# Patient Record
Sex: MALE | Race: WHITE | NOT HISPANIC OR LATINO | Employment: OTHER | URBAN - METROPOLITAN AREA
[De-identification: names, ages, dates, MRNs, and addresses within clinical notes are randomized per-mention and may not be internally consistent; named-entity substitution may affect disease eponyms.]

---

## 2017-01-05 ENCOUNTER — TELEPHONE (OUTPATIENT)
Dept: FAMILY MEDICINE | Facility: CLINIC | Age: 72
End: 2017-01-05

## 2017-01-05 DIAGNOSIS — G89.29 CHRONIC LOW BACK PAIN, UNSPECIFIED BACK PAIN LATERALITY, WITH SCIATICA PRESENCE UNSPECIFIED: ICD-10-CM

## 2017-01-05 DIAGNOSIS — F41.9 ANXIETY: ICD-10-CM

## 2017-01-05 DIAGNOSIS — M54.5 CHRONIC LOW BACK PAIN, UNSPECIFIED BACK PAIN LATERALITY, WITH SCIATICA PRESENCE UNSPECIFIED: ICD-10-CM

## 2017-01-05 NOTE — TELEPHONE ENCOUNTER
Last ov with wolf nicole on 11/01/2016.  Last ov with dr valderrama on 08/01/2016.     Xanax last filled 11/29/2016 by dr valderrama.  norco last filled 11/29/2016 by dr valderrama.

## 2017-01-05 NOTE — TELEPHONE ENCOUNTER
----- Message from Sheyla Nuñez sent at 1/5/2017  2:00 PM CST -----  Contact: Citlali  Patient's daughter is requesting refill Rx Xanax and Norco to be sent to     [facility] [id number] - [place] - [address] AT [facility] [address] [place] [address]  Phone: [phone] Fax: [phone]    Out of medication. Please call [phone]. Thanks!

## 2017-01-06 RX ORDER — CALCITRIOL 0.5 UG/1
CAPSULE ORAL
COMMUNITY
Start: 2016-12-21 | End: 2018-03-14

## 2017-01-06 RX ORDER — HYDROCODONE BITARTRATE AND ACETAMINOPHEN 7.5; 325 MG/1; MG/1
1 TABLET ORAL EVERY 6 HOURS PRN
Qty: 90 TABLET | Refills: 0 | Status: SHIPPED | OUTPATIENT
Start: 2017-01-06 | End: 2017-02-10 | Stop reason: SDUPTHER

## 2017-01-06 RX ORDER — ALPRAZOLAM 1 MG/1
1 TABLET ORAL NIGHTLY
Qty: 30 TABLET | Refills: 0 | Status: SHIPPED | OUTPATIENT
Start: 2017-01-06 | End: 2017-02-10 | Stop reason: SDUPTHER

## 2017-01-16 ENCOUNTER — TELEPHONE (OUTPATIENT)
Dept: FAMILY MEDICINE | Facility: CLINIC | Age: 72
End: 2017-01-16

## 2017-01-16 NOTE — TELEPHONE ENCOUNTER
----- Message from Lisha Velasco sent at 1/16/2017  1:20 PM CST -----  Citlali Bustos [phone] ... Has ear ache and sinus infection ...askin to have something called in

## 2017-01-16 NOTE — TELEPHONE ENCOUNTER
Pt daughter states that he has right ear pain and sinus pressure x 2 days.      Please advise if you are sending something in or if you want pt to be seen.    Thanks

## 2017-01-17 NOTE — TELEPHONE ENCOUNTER
Appointment scheduled with patient and daughter for pt to see Dr. Granda tomorrow, 1/18/17 at 3:00 pm.

## 2017-01-18 ENCOUNTER — OFFICE VISIT (OUTPATIENT)
Dept: FAMILY MEDICINE | Facility: CLINIC | Age: 72
End: 2017-01-18
Payer: MEDICARE

## 2017-01-18 ENCOUNTER — HOSPITAL ENCOUNTER (OUTPATIENT)
Dept: RADIOLOGY | Facility: HOSPITAL | Age: 72
Discharge: HOME | End: 2017-01-18
Attending: INTERNAL MEDICINE
Payer: MEDICARE

## 2017-01-18 ENCOUNTER — TELEPHONE (OUTPATIENT)
Dept: UROLOGY | Facility: CLINIC | Age: 72
End: 2017-01-18

## 2017-01-18 ENCOUNTER — HOSPITAL ENCOUNTER (OUTPATIENT)
Dept: RADIOLOGY | Facility: HOSPITAL | Age: 72
Discharge: HOME | End: 2017-01-18
Attending: UROLOGY
Payer: MEDICARE

## 2017-01-18 ENCOUNTER — HOSPITAL ENCOUNTER (OUTPATIENT)
Dept: CARDIOLOGY | Facility: HOSPITAL | Age: 72
Discharge: HOME | End: 2017-01-18
Attending: INTERNAL MEDICINE
Payer: MEDICARE

## 2017-01-18 VITALS
SYSTOLIC BLOOD PRESSURE: 116 MMHG | HEIGHT: 70 IN | RESPIRATION RATE: 20 BRPM | HEART RATE: 99 BPM | DIASTOLIC BLOOD PRESSURE: 69 MMHG | TEMPERATURE: 98 F | WEIGHT: 287.5 LBS | BODY MASS INDEX: 41.16 KG/M2

## 2017-01-18 DIAGNOSIS — N28.89 RENAL MASS: ICD-10-CM

## 2017-01-18 DIAGNOSIS — H66.91 ACUTE OTITIS MEDIA, RIGHT: Primary | ICD-10-CM

## 2017-01-18 DIAGNOSIS — I77.9 CAROTID DISEASE, BILATERAL: Chronic | ICD-10-CM

## 2017-01-18 DIAGNOSIS — I25.10 CORONARY ARTERY DISEASE INVOLVING NATIVE CORONARY ARTERY OF NATIVE HEART WITHOUT ANGINA PECTORIS: Chronic | ICD-10-CM

## 2017-01-18 DIAGNOSIS — J20.9 ACUTE BRONCHITIS, UNSPECIFIED ORGANISM: ICD-10-CM

## 2017-01-18 DIAGNOSIS — R07.2 PRECORDIAL PAIN: ICD-10-CM

## 2017-01-18 PROCEDURE — 96372 THER/PROPH/DIAG INJ SC/IM: CPT | Mod: S$GLB,,, | Performed by: FAMILY MEDICINE

## 2017-01-18 PROCEDURE — 93880 EXTRACRANIAL BILAT STUDY: CPT | Mod: 26,,, | Performed by: INTERNAL MEDICINE

## 2017-01-18 PROCEDURE — 93880 EXTRACRANIAL BILAT STUDY: CPT | Mod: TC

## 2017-01-18 PROCEDURE — 99999 PR PBB SHADOW E&M-EST. PATIENT-LVL III: CPT | Mod: PBBFAC,,, | Performed by: FAMILY MEDICINE

## 2017-01-18 PROCEDURE — 78452 HT MUSCLE IMAGE SPECT MULT: CPT | Mod: TC

## 2017-01-18 PROCEDURE — 93018 CV STRESS TEST I&R ONLY: CPT | Mod: ,,, | Performed by: INTERNAL MEDICINE

## 2017-01-18 PROCEDURE — 1157F ADVNC CARE PLAN IN RCRD: CPT | Mod: S$GLB,,, | Performed by: FAMILY MEDICINE

## 2017-01-18 PROCEDURE — 3078F DIAST BP <80 MM HG: CPT | Mod: S$GLB,,, | Performed by: FAMILY MEDICINE

## 2017-01-18 PROCEDURE — 3074F SYST BP LT 130 MM HG: CPT | Mod: S$GLB,,, | Performed by: FAMILY MEDICINE

## 2017-01-18 PROCEDURE — 76770 US EXAM ABDO BACK WALL COMP: CPT | Mod: 26,,, | Performed by: RADIOLOGY

## 2017-01-18 PROCEDURE — 1125F AMNT PAIN NOTED PAIN PRSNT: CPT | Mod: S$GLB,,, | Performed by: FAMILY MEDICINE

## 2017-01-18 PROCEDURE — 99214 OFFICE O/P EST MOD 30 MIN: CPT | Mod: 25,S$GLB,, | Performed by: FAMILY MEDICINE

## 2017-01-18 PROCEDURE — 76770 US EXAM ABDO BACK WALL COMP: CPT | Mod: TC

## 2017-01-18 PROCEDURE — 1160F RVW MEDS BY RX/DR IN RCRD: CPT | Mod: S$GLB,,, | Performed by: FAMILY MEDICINE

## 2017-01-18 PROCEDURE — 1159F MED LIST DOCD IN RCRD: CPT | Mod: S$GLB,,, | Performed by: FAMILY MEDICINE

## 2017-01-18 PROCEDURE — 63600175 PHARM REV CODE 636 W HCPCS

## 2017-01-18 PROCEDURE — 78452 HT MUSCLE IMAGE SPECT MULT: CPT | Mod: 26,,, | Performed by: INTERNAL MEDICINE

## 2017-01-18 PROCEDURE — 93017 CV STRESS TEST TRACING ONLY: CPT

## 2017-01-18 PROCEDURE — 93016 CV STRESS TEST SUPVJ ONLY: CPT | Mod: ,,, | Performed by: INTERNAL MEDICINE

## 2017-01-18 RX ORDER — AMOXICILLIN AND CLAVULANATE POTASSIUM 875; 125 MG/1; MG/1
1 TABLET, FILM COATED ORAL 2 TIMES DAILY
Qty: 20 TABLET | Refills: 0 | Status: SHIPPED | OUTPATIENT
Start: 2017-01-18 | End: 2017-01-28

## 2017-01-18 RX ORDER — REGADENOSON 0.08 MG/ML
INJECTION, SOLUTION INTRAVENOUS
Status: COMPLETED
Start: 2017-01-18 | End: 2017-01-18

## 2017-01-18 RX ORDER — BETAMETHASONE SODIUM PHOSPHATE AND BETAMETHASONE ACETATE 3; 3 MG/ML; MG/ML
9 INJECTION, SUSPENSION INTRA-ARTICULAR; INTRALESIONAL; INTRAMUSCULAR; SOFT TISSUE
Status: COMPLETED | OUTPATIENT
Start: 2017-01-18 | End: 2017-01-18

## 2017-01-18 RX ADMIN — BETAMETHASONE SODIUM PHOSPHATE AND BETAMETHASONE ACETATE 9 MG: 3; 3 INJECTION, SUSPENSION INTRA-ARTICULAR; INTRALESIONAL; INTRAMUSCULAR; SOFT TISSUE at 02:01

## 2017-01-18 NOTE — TELEPHONE ENCOUNTER
----- Message from Adeline Moss MD sent at 1/18/2017 12:14 PM CST -----  Please send a copy of the us results to pt via mail

## 2017-01-18 NOTE — MR AVS SNAPSHOT
[facility]  [address] [place] [address]  Phone: [phone]  Fax: [phone]                  Richard Bustos   2017 3:00 PM   Office Visit    Description:  Male : 1945   Provider:  Anirudh Granda MD   Department:  [facility] Medicine           Reason for Visit     Sinus Problem           Diagnoses this Visit        Comments    Acute otitis media, right    -  Primary     Acute bronchitis, unspecified organism                To Do List           Future Appointments        Provider Department Dept Phone    2017 3:00 PM Anirudh Granda MD [facility] [phone]    2017 9:40 AM ELEANOR LewisP-C [facility] [phone]    2/10/2017 11:30 AM Familia Tate MD [facility] [place] [phone]    2017 10:00 AM Adeline Moss MD [facility] - Urology [phone]    3/6/2017 10:00 AM Familia Ramirez Jr., MD [facility] [phone]      Goals (5 Years of Data)     None       These Medications        Disp Refills Start End    amoxicillin-clavulanate 875-125mg (AUGMENTIN) 875-125 mg per tablet 20 tablet 0 2017    Take 1 tablet by mouth 2 (two) times daily. Take after meals. - Oral    Pharmacy: [facility] [address] [place] [address] [address] AT [facility] Ph #: [phone]         [facility] On Call     [facility] On Call Nurse Care Line -  Assistance  Registered nurses in the [facility] On Call Center provide clinical advisement, health education, appointment booking, and other advisory services.  Call for this free service at [phone].             Medications           Message regarding Medications     Verify the changes and/or additions to your medication regime listed below are the same as discussed with your clinician today.  If any of these changes or additions are incorrect, please notify your healthcare provider.        START  taking these NEW medications        Refills    amoxicillin-clavulanate 875-125mg (AUGMENTIN) 875-125 mg per tablet 0    Sig: Take 1 tablet by mouth 2 (two) times daily. Take after meals.    Class: Normal    Route: Oral      These medications were administered today        Dose Freq    betamethasone acetate-betamethasone sodium phosphate injection 9 mg 9 mg [facility] 1 time    Sig: Inject 1.5 mLs (9 mg total) into the muscle one time.    Class: Normal    Route: Intramuscular           Verify that the below list of medications is an accurate representation of the medications you are currently taking.  If none reported, the list may be blank. If incorrect, please contact your healthcare provider. Carry this list with you in case of emergency.           Current Medications     allopurinol (ZYLOPRIM) 100 MG tablet Take 1 tablet (100 mg total) by mouth once daily.    alprazolam (XANAX) 1 MG tablet Take 1 tablet (1 mg total) by mouth every evening.    amoxicillin-clavulanate 875-125mg (AUGMENTIN) 875-125 mg per tablet Take 1 tablet by mouth 2 (two) times daily. Take after meals.    atorvastatin (LIPITOR) 80 MG tablet Take 1 tablet (80 mg total) by mouth once daily.    calcitRIOL (ROCALTROL) 0.25 MCG Cap TK 1 C PO QD    calcitRIOL (ROCALTROL) 0.5 MCG Cap     carvedilol (COREG) 25 MG tablet Take 1 tablet (25 mg total) by mouth 2 (two) times daily with meals.    ciclopirox (PENLAC) 8 % Soln Apply topically nightly.    ciclopirox 0.77 % Gel Apply topically 2 (two) times daily.    clotrimazole-betamethasone 1-0.05% (LOTRISONE) cream Apply topically 2 (two) times daily. To head of penis    diazepam (VALIUM) 5 MG tablet Take 5 mg by mouth as directed.    ergocalciferol (ERGOCALCIFEROL) 50,000 unit Cap TK 1 C PO Q WEEK    ferrous sulfate 325 (65 FE) MG EC tablet Take 1 tablet (325 mg total) by mouth 2 (two) times daily.    FOLIC ACID/MULTIVIT-MIN/LUTEIN (CENTRUM SILVER ORAL) Take 1 tablet by mouth once daily.    furosemide  "(LASIX) 40 MG tablet TAKE 1 TABLET BY MOUTH DAILY AS NEEDED    hydrocodone-acetaminophen 7.5-325mg (NORCO) 7.5-325 mg per tablet Take 1 tablet by mouth every 6 (six) hours as needed for Pain.    L-METHYL-B6-B12 3-35-2 mg Tab TAKE 1 TABLET BY MOUTH TWICE DAILY    lisinopril (PRINIVIL,ZESTRIL) 20 MG tablet Take 1 tablet (20 mg total) by mouth once daily.    magnesium oxide (MAG-OX) 400 mg tablet Take 1 tablet (400 mg total) by mouth once daily.    mirabegron (MYRBETRIQ) 50 mg Tb24 Take 1 tablet (50 mg total) by mouth once daily.    nitroGLYCERIN 0.4 MG/DOSE TL SPRY (NITROLINGUAL) 400 mcg/spray spray PLACE 1 SPRAY UNDER THE TONGUE EVERY 5 MINUTES AS NEEDED FOR CHEST PAIN    omeprazole (PRILOSEC) 20 MG capsule Take 20 mg by mouth once daily.    pantoprazole (PROTONIX) 20 MG tablet Take 1 tablet (20 mg total) by mouth 2 (two) times daily before meals.    ranitidine (ZANTAC) 150 MG tablet Take 1 tablet (150 mg total) by mouth 2 (two) times daily.    salicylic acid (SALACYN) 6 % Crea Apply 1 application topically 2 (two) times daily.    vit C-vit E-lutein-min-om-3 (OCUVITE) 904-47-3-150 mg-unit-mg-mg Cap Take 1 capsule by mouth once daily.    warfarin (COUMADIN) 5 MG tablet            Clinical Reference Information           Vital Signs - Last Recorded  Most recent update: 1/18/2017  2:19 PM by Adeline Ward    BP Pulse Temp Resp    116/69 (BP Location: Right arm, Patient Position: Sitting, BP Method: Automatic) 99 97.9 °F (36.6 °C) (Oral) 20    Ht Wt BMI    5' 9.5" (1.765 m) 130.4 kg (287 lb 7.7 oz) 41.84 kg/m2      Blood Pressure          Most Recent Value    BP  116/69      Allergies as of 1/18/2017     Shellfish Containing Products      Immunizations Administered on Date of Encounter - 1/18/2017     None      Administrations This Visit     betamethasone acetate-betamethasone sodium phosphate injection 9 mg     Admin Date Action Dose Route Administered By             01/18/2017 Given 9 mg Intramuscular Kari THURMAN" Kadie, LPN

## 2017-01-18 NOTE — PROGRESS NOTES
Administered 9 mg Celestone IM. Patient tolerated well. No bleeding at insertion site noted. Pain scale 0/10 with injection. 2 patient identifiers used (name, ), aseptic technique maintained.

## 2017-01-18 NOTE — PROGRESS NOTES
Subjective:       Patient ID: Richard Bustos is a 71 y.o. male.    Chief Complaint: Sinus Problem (sinus pain and pressure, bilateral ear pain worse in the right ear)    Sinus Problem   This is a new problem. The current episode started in the past 7 days. The problem has been gradually worsening since onset. There has been no fever. The pain is moderate. Associated symptoms include coughing, ear pain (R >> L), sinus pressure and a sore throat. Pertinent negatives include no shortness of breath. Treatments tried: Mucinex D and Flonase. The treatment provided mild relief.     Review of Systems   Constitutional: Negative for fever.   HENT: Positive for ear pain (R >> L), sinus pressure and sore throat.    Respiratory: Positive for cough. Negative for shortness of breath.    Cardiovascular: Negative for chest pain.   Gastrointestinal: Negative for abdominal pain and nausea.   Skin: Negative for rash.   Neurological: Negative for numbness.   All other systems reviewed and are negative.      Objective:      Physical Exam   Constitutional: He appears well-developed. No distress.   HENT:   Right Ear: Tympanic membrane is injected. A middle ear effusion is present.   Left Ear: Tympanic membrane normal.   Nose: Mucosal edema present.   Mouth/Throat: Posterior oropharyngeal erythema present.   Neck: Neck supple.   Cardiovascular: Normal rate and regular rhythm.    No murmur heard.  Pulmonary/Chest: Effort normal and breath sounds normal.   Lymphadenopathy:     He has no cervical adenopathy.   Skin: Skin is warm and dry.       Assessment:       1. Acute otitis media, right    2. Acute bronchitis, unspecified organism        Plan:         Richard was seen today for sinus problem.    Diagnoses and all orders for this visit:    Acute otitis media, right    Acute bronchitis, unspecified organism    Other orders  -     amoxicillin-clavulanate 875-125mg (AUGMENTIN) 875-125 mg per tablet; Take 1 tablet by mouth 2 (two) times daily. Take  after meals.  -     betamethasone acetate-betamethasone sodium phosphate injection 9 mg; Inject 1.5 mLs (9 mg total) into the muscle one time.

## 2017-01-19 LAB
DIASTOLIC DYSFUNCTION: NO
INTERNAL CAROTID STENOSIS: NORMAL

## 2017-01-20 ENCOUNTER — TELEPHONE (OUTPATIENT)
Dept: FAMILY MEDICINE | Facility: CLINIC | Age: 72
End: 2017-01-20

## 2017-01-20 LAB — INR PPP: 2.3

## 2017-01-20 NOTE — TELEPHONE ENCOUNTER
----- Message from Adeline Garrison sent at 1/20/2017 10:36 AM CST -----  [facility]  Verifying you received paperwork on prior auth for Alprazelam.  Please call back at  ext [phone].

## 2017-01-24 ENCOUNTER — ANTI-COAG VISIT (OUTPATIENT)
Dept: CARDIOLOGY | Facility: CLINIC | Age: 72
End: 2017-01-24

## 2017-01-24 DIAGNOSIS — Z79.01 LONG TERM (CURRENT) USE OF ANTICOAGULANTS: ICD-10-CM

## 2017-01-24 DIAGNOSIS — D68.59 HYPERCOAGULABLE STATE: ICD-10-CM

## 2017-01-24 NOTE — PROGRESS NOTES
Per Citlali pt never changed dose last time and continued to take 2.5mg wed/sat (they have been doing this for 20 years and know what to take, Citlali states)

## 2017-01-25 ENCOUNTER — PATIENT MESSAGE (OUTPATIENT)
Dept: FAMILY MEDICINE | Facility: CLINIC | Age: 72
End: 2017-01-25

## 2017-01-30 ENCOUNTER — TELEPHONE (OUTPATIENT)
Dept: FAMILY MEDICINE | Facility: CLINIC | Age: 72
End: 2017-01-30

## 2017-01-30 NOTE — TELEPHONE ENCOUNTER
Lizy with [employer] called about an appeal for Mr. Bustos's Alprazolam.  She needs to know:  1) Do you believe the benefits out weigh the risks?  2) Did you discuss these risks with the patient?  3) How long has he been on this medication?  4)Has he tried and failed any other medications in the past?        Her direct line to call her back with these answers are [phone].

## 2017-01-31 ENCOUNTER — DOCUMENTATION ONLY (OUTPATIENT)
Dept: FAMILY MEDICINE | Facility: CLINIC | Age: 72
End: 2017-01-31

## 2017-01-31 NOTE — PROGRESS NOTES
Pre-Visit Chart Review  For Appointment Scheduled on 2/1/17    Health Maintenance Due   Topic Date Due    Hepatitis C Screening  1945    Eye Exam  09/04/1957    TETANUS VACCINE  09/04/1965    Foot Exam  12/02/2016

## 2017-02-01 ENCOUNTER — OFFICE VISIT (OUTPATIENT)
Dept: PODIATRY | Facility: CLINIC | Age: 72
End: 2017-02-01
Payer: MEDICARE

## 2017-02-01 ENCOUNTER — OFFICE VISIT (OUTPATIENT)
Dept: FAMILY MEDICINE | Facility: CLINIC | Age: 72
End: 2017-02-01
Payer: MEDICARE

## 2017-02-01 ENCOUNTER — HOSPITAL ENCOUNTER (OUTPATIENT)
Dept: RADIOLOGY | Facility: CLINIC | Age: 72
Discharge: HOME | End: 2017-02-01
Attending: PODIATRIST
Payer: MEDICARE

## 2017-02-01 VITALS
BODY MASS INDEX: 42.01 KG/M2 | TEMPERATURE: 98 F | DIASTOLIC BLOOD PRESSURE: 63 MMHG | WEIGHT: 293.44 LBS | SYSTOLIC BLOOD PRESSURE: 117 MMHG | HEIGHT: 70 IN | HEART RATE: 55 BPM

## 2017-02-01 VITALS — BODY MASS INDEX: 43.4 KG/M2 | HEIGHT: 69 IN | WEIGHT: 293 LBS

## 2017-02-01 DIAGNOSIS — L97.912 LEG ULCER, RIGHT, WITH FAT LAYER EXPOSED: Primary | ICD-10-CM

## 2017-02-01 DIAGNOSIS — F41.9 ANXIETY: ICD-10-CM

## 2017-02-01 DIAGNOSIS — Z23 NEED FOR PNEUMOCOCCAL VACCINATION: ICD-10-CM

## 2017-02-01 DIAGNOSIS — E11.42 DIABETIC POLYNEUROPATHY ASSOCIATED WITH TYPE 2 DIABETES MELLITUS: ICD-10-CM

## 2017-02-01 DIAGNOSIS — Z11.59 NEED FOR HEPATITIS C SCREENING TEST: ICD-10-CM

## 2017-02-01 DIAGNOSIS — I83.009 VENOUS STASIS ULCER: ICD-10-CM

## 2017-02-01 DIAGNOSIS — G89.29 CHRONIC LOW BACK PAIN, UNSPECIFIED BACK PAIN LATERALITY, WITH SCIATICA PRESENCE UNSPECIFIED: Primary | ICD-10-CM

## 2017-02-01 DIAGNOSIS — Z79.01 LONG-TERM (CURRENT) USE OF ANTICOAGULANTS: ICD-10-CM

## 2017-02-01 DIAGNOSIS — M19.90 OTHER TYPE OF OSTEOARTHRITIS, UNSPECIFIED SITE: ICD-10-CM

## 2017-02-01 DIAGNOSIS — L97.909 VENOUS STASIS ULCER: ICD-10-CM

## 2017-02-01 DIAGNOSIS — Z23 NEED FOR TDAP VACCINATION: ICD-10-CM

## 2017-02-01 DIAGNOSIS — E11.9 CONTROLLED TYPE 2 DIABETES MELLITUS WITHOUT COMPLICATION, UNSPECIFIED LONG TERM INSULIN USE STATUS: ICD-10-CM

## 2017-02-01 DIAGNOSIS — B35.1 ONYCHOMYCOSIS DUE TO DERMATOPHYTE: ICD-10-CM

## 2017-02-01 DIAGNOSIS — L97.912 LEG ULCER, RIGHT, WITH FAT LAYER EXPOSED: ICD-10-CM

## 2017-02-01 DIAGNOSIS — E66.01 MORBID OBESITY DUE TO EXCESS CALORIES: Chronic | ICD-10-CM

## 2017-02-01 DIAGNOSIS — M54.5 CHRONIC LOW BACK PAIN, UNSPECIFIED BACK PAIN LATERALITY, WITH SCIATICA PRESENCE UNSPECIFIED: ICD-10-CM

## 2017-02-01 DIAGNOSIS — I73.9 PERIPHERAL VASCULAR DISEASE: ICD-10-CM

## 2017-02-01 DIAGNOSIS — M54.5 CHRONIC LOW BACK PAIN, UNSPECIFIED BACK PAIN LATERALITY, WITH SCIATICA PRESENCE UNSPECIFIED: Primary | ICD-10-CM

## 2017-02-01 DIAGNOSIS — G89.29 CHRONIC LOW BACK PAIN, UNSPECIFIED BACK PAIN LATERALITY, WITH SCIATICA PRESENCE UNSPECIFIED: ICD-10-CM

## 2017-02-01 PROCEDURE — 99499 UNLISTED E&M SERVICE: CPT | Mod: S$PBB,,, | Performed by: PODIATRIST

## 2017-02-01 PROCEDURE — 99499 UNLISTED E&M SERVICE: CPT | Mod: S$PBB,,, | Performed by: NURSE PRACTITIONER

## 2017-02-01 PROCEDURE — 1160F RVW MEDS BY RX/DR IN RCRD: CPT | Mod: S$GLB,,, | Performed by: PODIATRIST

## 2017-02-01 PROCEDURE — 1159F MED LIST DOCD IN RCRD: CPT | Mod: S$GLB,,, | Performed by: PODIATRIST

## 2017-02-01 PROCEDURE — 1125F AMNT PAIN NOTED PAIN PRSNT: CPT | Mod: S$GLB,,, | Performed by: NURSE PRACTITIONER

## 2017-02-01 PROCEDURE — 3046F HEMOGLOBIN A1C LEVEL >9.0%: CPT | Mod: S$GLB,,, | Performed by: NURSE PRACTITIONER

## 2017-02-01 PROCEDURE — G0009 ADMIN PNEUMOCOCCAL VACCINE: HCPCS | Mod: S$GLB,,, | Performed by: NURSE PRACTITIONER

## 2017-02-01 PROCEDURE — 3078F DIAST BP <80 MM HG: CPT | Mod: S$GLB,,, | Performed by: PODIATRIST

## 2017-02-01 PROCEDURE — 2022F DILAT RTA XM EVC RTNOPTHY: CPT | Mod: S$GLB,,, | Performed by: PODIATRIST

## 2017-02-01 PROCEDURE — 3078F DIAST BP <80 MM HG: CPT | Mod: S$GLB,,, | Performed by: NURSE PRACTITIONER

## 2017-02-01 PROCEDURE — 99214 OFFICE O/P EST MOD 30 MIN: CPT | Mod: 25,S$GLB,, | Performed by: NURSE PRACTITIONER

## 2017-02-01 PROCEDURE — 73590 X-RAY EXAM OF LOWER LEG: CPT | Mod: 26,RT,S$GLB, | Performed by: RADIOLOGY

## 2017-02-01 PROCEDURE — 3074F SYST BP LT 130 MM HG: CPT | Mod: S$GLB,,, | Performed by: NURSE PRACTITIONER

## 2017-02-01 PROCEDURE — 1125F AMNT PAIN NOTED PAIN PRSNT: CPT | Mod: S$GLB,,, | Performed by: PODIATRIST

## 2017-02-01 PROCEDURE — 2022F DILAT RTA XM EVC RTNOPTHY: CPT | Mod: S$GLB,,, | Performed by: NURSE PRACTITIONER

## 2017-02-01 PROCEDURE — 1157F ADVNC CARE PLAN IN RCRD: CPT | Mod: S$GLB,,, | Performed by: NURSE PRACTITIONER

## 2017-02-01 PROCEDURE — 99999 PR PBB SHADOW E&M-EST. PATIENT-LVL III: CPT | Mod: PBBFAC,,, | Performed by: PODIATRIST

## 2017-02-01 PROCEDURE — 90732 PPSV23 VACC 2 YRS+ SUBQ/IM: CPT | Mod: S$GLB,,, | Performed by: NURSE PRACTITIONER

## 2017-02-01 PROCEDURE — 99999 PR PBB SHADOW E&M-EST. PATIENT-LVL V: CPT | Mod: PBBFAC,,, | Performed by: NURSE PRACTITIONER

## 2017-02-01 PROCEDURE — 1157F ADVNC CARE PLAN IN RCRD: CPT | Mod: S$GLB,,, | Performed by: PODIATRIST

## 2017-02-01 PROCEDURE — 3046F HEMOGLOBIN A1C LEVEL >9.0%: CPT | Mod: S$GLB,,, | Performed by: PODIATRIST

## 2017-02-01 PROCEDURE — 11721 DEBRIDE NAIL 6 OR MORE: CPT | Mod: 59,Q9,S$GLB, | Performed by: PODIATRIST

## 2017-02-01 PROCEDURE — 99213 OFFICE O/P EST LOW 20 MIN: CPT | Mod: 25,S$GLB,, | Performed by: PODIATRIST

## 2017-02-01 PROCEDURE — 4010F ACE/ARB THERAPY RXD/TAKEN: CPT | Mod: S$GLB,,, | Performed by: NURSE PRACTITIONER

## 2017-02-01 PROCEDURE — 73590 X-RAY EXAM OF LOWER LEG: CPT | Mod: TC,PO,RT

## 2017-02-01 PROCEDURE — 1159F MED LIST DOCD IN RCRD: CPT | Mod: S$GLB,,, | Performed by: NURSE PRACTITIONER

## 2017-02-01 PROCEDURE — 1160F RVW MEDS BY RX/DR IN RCRD: CPT | Mod: S$GLB,,, | Performed by: NURSE PRACTITIONER

## 2017-02-01 PROCEDURE — 3074F SYST BP LT 130 MM HG: CPT | Mod: S$GLB,,, | Performed by: PODIATRIST

## 2017-02-01 PROCEDURE — 11042 DBRDMT SUBQ TIS 1ST 20SQCM/<: CPT | Mod: S$GLB,,, | Performed by: PODIATRIST

## 2017-02-01 PROCEDURE — 4010F ACE/ARB THERAPY RXD/TAKEN: CPT | Mod: S$GLB,,, | Performed by: PODIATRIST

## 2017-02-01 RX ORDER — ALPRAZOLAM 1 MG/1
1 TABLET ORAL NIGHTLY
Qty: 30 TABLET | Refills: 0 | OUTPATIENT
Start: 2017-02-01 | End: 2017-03-03

## 2017-02-01 RX ORDER — MUPIROCIN 20 MG/G
OINTMENT TOPICAL 3 TIMES DAILY
Qty: 30 G | Refills: 3 | Status: SHIPPED | OUTPATIENT
Start: 2017-02-01 | End: 2017-02-11

## 2017-02-01 RX ORDER — HYDROCODONE BITARTRATE AND ACETAMINOPHEN 7.5; 325 MG/1; MG/1
1 TABLET ORAL EVERY 6 HOURS PRN
Qty: 90 TABLET | Refills: 0 | OUTPATIENT
Start: 2017-02-01

## 2017-02-01 NOTE — LETTER
February 1, 2017      Marina Cardenas, ELEANORP-C  [address] [place] [address]           [place] [facility]  [address]  [place] [address]  Phone: [phone]          Patient: Richard Bustos   MR Number: [id number]   YOB: 1945   Date of Visit: 2/1/2017       Dear Marina Cardenas:    Thank you for referring Richard Bustos to me for evaluation. Attached you will find relevant portions of my assessment and plan of care.    If you have questions, please do not hesitate to call me. I look forward to following Richard Bustos along with you.    Sincerely,    Iván Torrez, OMID    Enclosure  CC:  No Recipients    If you would like to receive this communication electronically, please contact [email or web address] or [phone] to request more information on eSoft Link access.    For providers and/or their staff who would like to refer a patient to [facility], please contact us through our one-stop-shop provider referral line, [facility], at [phone].    If you feel you have received this communication in error or would no longer like to receive these types of communications, please e-mail [email or web address]

## 2017-02-01 NOTE — MR AVS SNAPSHOT
[place] [facility]  [address] [place] [address]  Phone: [phone]                  Richard Bustos   2017 10:45 AM   Office Visit    Description:  Male : 1945   Provider:  Iván Torrez DPM   Department:  [place]           Reason for Visit     Diabetic Foot Exam     Foot Ulcer           Diagnoses this Visit        Comments    Leg ulcer, right, with fat layer exposed    -  Primary     Onychomycosis due to dermatophyte         Diabetic polyneuropathy associated with type 2 diabetes mellitus         Peripheral vascular disease         Long-term (current) use of anticoagulants                To Do List           Future Appointments        Provider Department Dept Phone    2017 12:00 PM [facility] [phone]    2017 12:30 PM LAB, [facility] - Lab [phone]    2017 9:30 AM Iván Torrez DPM [facility] - Podiatry [phone]    2/10/2017 11:30 AM Familia Tate MD [facility] - Cardiology [phone]    2017 10:00 AM Adeline Moss MD [facility] - Urology [phone]      Goals (5 Years of Data)     None      Follow-Up and Disposition     Return in about 1 week (around 2017) for wound care right leg.      [facility] On Call     [facility] On Call Nurse Care Line - 24/7 Assistance  Registered nurses in the [facility] On Call Center provide clinical advisement, health education, appointment booking, and other advisory services.  Call for this free service at [phone].             Medications           Message regarding Medications     Verify the changes and/or additions to your medication regime listed below are the same as discussed with your clinician today.  If any of these changes or additions are incorrect, please notify your healthcare provider.             Verify that the below list of medications is an accurate representation of the medications you are currently taking.  If none reported, the list may be  blank. If incorrect, please contact your healthcare provider. Carry this list with you in case of emergency.           Current Medications     allopurinol (ZYLOPRIM) 100 MG tablet Take 1 tablet (100 mg total) by mouth once daily.    alprazolam (XANAX) 1 MG tablet Take 1 tablet (1 mg total) by mouth every evening.    atorvastatin (LIPITOR) 80 MG tablet Take 1 tablet (80 mg total) by mouth once daily.    calcitRIOL (ROCALTROL) 0.5 MCG Cap     carvedilol (COREG) 25 MG tablet Take 1 tablet (25 mg total) by mouth 2 (two) times daily with meals.    ciclopirox (PENLAC) 8 % Soln Apply topically nightly.    ciclopirox 0.77 % Gel Apply topically 2 (two) times daily.    clotrimazole-betamethasone 1-0.05% (LOTRISONE) cream Apply topically 2 (two) times daily. To head of penis    diazepam (VALIUM) 5 MG tablet Take 5 mg by mouth as directed.    ergocalciferol (ERGOCALCIFEROL) 50,000 unit Cap TK 1 C PO Q WEEK    ferrous sulfate 325 (65 FE) MG EC tablet Take 1 tablet (325 mg total) by mouth 2 (two) times daily.    FOLIC ACID/MULTIVIT-MIN/LUTEIN (CENTRUM SILVER ORAL) Take 1 tablet by mouth once daily.    furosemide (LASIX) 40 MG tablet TAKE 1 TABLET BY MOUTH DAILY AS NEEDED    hydrocodone-acetaminophen 7.5-325mg (NORCO) 7.5-325 mg per tablet Take 1 tablet by mouth every 6 (six) hours as needed for Pain.    L-METHYL-B6-B12 3-35-2 mg Tab TAKE 1 TABLET BY MOUTH TWICE DAILY    lisinopril (PRINIVIL,ZESTRIL) 20 MG tablet Take 1 tablet (20 mg total) by mouth once daily.    magnesium oxide (MAG-OX) 400 mg tablet Take 1 tablet (400 mg total) by mouth once daily.    mirabegron (MYRBETRIQ) 50 mg Tb24 Take 1 tablet (50 mg total) by mouth once daily.    nitroGLYCERIN 0.4 MG/DOSE TL SPRY (NITROLINGUAL) 400 mcg/spray spray PLACE 1 SPRAY UNDER THE TONGUE EVERY 5 MINUTES AS NEEDED FOR CHEST PAIN    omeprazole (PRILOSEC) 20 MG capsule Take 20 mg by mouth once daily.    pantoprazole (PROTONIX) 20 MG tablet Take 1 tablet (20 mg total) by mouth 2 (two)  "times daily before meals.    ranitidine (ZANTAC) 150 MG tablet Take 1 tablet (150 mg total) by mouth 2 (two) times daily.    salicylic acid (SALACYN) 6 % Crea Apply 1 application topically 2 (two) times daily.    vit C-vit E-lutein-min-om-3 (OCUVITE) 418-72-2-150 mg-unit-mg-mg Cap Take 1 capsule by mouth once daily.    warfarin (COUMADIN) 5 MG tablet     diphth,pertus,acell,,tetanus (BOOSTRIX) 2.5-8-5 Lf-mcg-Lf/0.5mL Syrg injection Inject 0.5 mLs into the muscle once.    mupirocin (BACTROBAN) 2 % ointment Apply topically 3 (three) times daily.           Clinical Reference Information           Vital Signs - Last Recorded  Most recent update: 2/1/2017 10:48 AM by Faviola Polanco LPN     Wt BMI          5' 9" (1.753 m) 132.9 kg (292 lb 15.9 oz) 43.27 kg/m2        Allergies as of 2/1/2017     Shellfish Containing Products      Immunizations Administered on Date of Encounter - 2/1/2017     Name Date Dose VIS Date Route    Pneumococcal Polysaccharide - 23 Valent  Incomplete 0.5 mL 4/24/2015 Intramuscular      Orders Placed During Today's Visit     Future Labs/Procedures Expected by Expires    X-Ray Tibia Fibula 2 View Right  2/1/2017 2/1/2018      "

## 2017-02-01 NOTE — PROGRESS NOTES
Subjective:       Patient ID: Richard Bustos is a 71 y.o. male.    Chief Complaint: medication documentation (Norco)    HPI Comments: Mr. Bustos presents to the clinic today for three month medication documentation for Norco and Xanax.  He has a history of chronic back pain and hip pain.  He has history of bilateral total hip replacements.  He denies illegal drug use or abuse of medications.  He takes Xanax for anxiety.  He recently had a large abdominal hematoma which has almost resolved without surgical intervention.  He does take warfarin.  He states blood sugars have been well controlled at home.  He will see his Cardiologist, Dr. Tate this month for routine follow up.  He has history of hypertension which is well controlled with current medication.  He did have eye exam recently but cannot remember which eye doctor he saw.  He is due for A1c, Tdap, foot exam, hepatitis C antibody.      Review of Systems   Constitutional: Negative for chills and fever.   HENT: Negative for congestion, ear pain and sinus pressure.    Eyes: Negative for visual disturbance.   Respiratory: Negative for cough, shortness of breath and wheezing.    Cardiovascular: Negative for chest pain, palpitations and leg swelling.   Gastrointestinal: Negative for abdominal pain, constipation and diarrhea.   Musculoskeletal: Positive for back pain. Negative for joint swelling.        Bilateral hip pain.   Skin: Positive for rash (chronic venous stasis dermatitis BLE's).        Resolving hematoma abdominal wall   Hematological: Bruises/bleeds easily.       Objective:      Physical Exam   Constitutional: He is oriented to person, place, and time. He appears well-developed and well-nourished. No distress.   HENT:   Head: Normocephalic and atraumatic.   Right Ear: External ear normal.   Left Ear: External ear normal.   Mouth/Throat: Oropharynx is clear and moist. No oropharyngeal exudate.   Eyes: Pupils are equal, round, and reactive to light. Right eye  exhibits no discharge. Left eye exhibits no discharge.   Neck: Neck supple. No thyromegaly present.   Cardiovascular: Normal rate and regular rhythm.  Exam reveals no gallop and no friction rub.    No murmur heard.  Pulses:       Dorsalis pedis pulses are 2+ on the right side, and 2+ on the left side.   Pulmonary/Chest: Effort normal and breath sounds normal. No respiratory distress. He has no wheezes. He has no rales.   Abdominal: Soft. He exhibits no distension. There is no tenderness.       Purple bruising which is much improved since last visit.  No induration.   Musculoskeletal:        Right foot: There is deformity. There is normal range of motion.        Left foot: There is deformity (bunion). There is normal range of motion.   Feet:   Right Foot:   Skin Integrity: Positive for callus and dry skin. Negative for ulcer, blister, skin breakdown, erythema or warmth.   Left Foot:   Skin Integrity: Positive for callus and dry skin. Negative for ulcer, blister, skin breakdown, erythema or warmth.   Lymphadenopathy:     He has no cervical adenopathy.   Neurological: He is alert and oriented to person, place, and time. Coordination normal.   Skin: Skin is warm and dry. Rash noted.        Skin darkening, thickening, and flaking to bilateral lower extremities   Psychiatric: He has a normal mood and affect. His behavior is normal. Thought content normal.   Vitals reviewed.          Current Outpatient Prescriptions:     allopurinol (ZYLOPRIM) 100 MG tablet, Take 1 tablet (100 mg total) by mouth once daily., Disp: 90 tablet, Rfl: 3    alprazolam (XANAX) 1 MG tablet, Take 1 tablet (1 mg total) by mouth every evening., Disp: 30 tablet, Rfl: 0    atorvastatin (LIPITOR) 80 MG tablet, Take 1 tablet (80 mg total) by mouth once daily., Disp: 90 tablet, Rfl: 3    calcitRIOL (ROCALTROL) 0.5 MCG Cap, , Disp: , Rfl:     carvedilol (COREG) 25 MG tablet, Take 1 tablet (25 mg total) by mouth 2 (two) times daily with meals., Disp:  180 tablet, Rfl: 3    ciclopirox (PENLAC) 8 % Soln, Apply topically nightly. (Patient taking differently: Apply topically daily as needed. ), Disp: 6.6 mL, Rfl: 11    ciclopirox 0.77 % Gel, Apply topically 2 (two) times daily., Disp: 100 g, Rfl: 3    clotrimazole-betamethasone 1-0.05% (LOTRISONE) cream, Apply topically 2 (two) times daily. To head of penis (Patient taking differently: Apply topically daily as needed. To head of penis), Disp: 1 Tube, Rfl: 2    diazepam (VALIUM) 5 MG tablet, Take 5 mg by mouth as directed., Disp: , Rfl:     ergocalciferol (ERGOCALCIFEROL) 50,000 unit Cap, TK 1 C PO Q WEEK, Disp: , Rfl: 0    ferrous sulfate 325 (65 FE) MG EC tablet, Take 1 tablet (325 mg total) by mouth 2 (two) times daily., Disp: 180 tablet, Rfl: 3    FOLIC ACID/MULTIVIT-MIN/LUTEIN (CENTRUM SILVER ORAL), Take 1 tablet by mouth once daily., Disp: , Rfl:     furosemide (LASIX) 40 MG tablet, TAKE 1 TABLET BY MOUTH DAILY AS NEEDED, Disp: 90 tablet, Rfl: 3    hydrocodone-acetaminophen 7.5-325mg (NORCO) 7.5-325 mg per tablet, Take 1 tablet by mouth every 6 (six) hours as needed for Pain., Disp: 90 tablet, Rfl: 0    L-METHYL-B6-B12 3-35-2 mg Tab, TAKE 1 TABLET BY MOUTH TWICE DAILY, Disp: 180 tablet, Rfl: 3    lisinopril (PRINIVIL,ZESTRIL) 20 MG tablet, Take 1 tablet (20 mg total) by mouth once daily., Disp: 90 tablet, Rfl: 3    magnesium oxide (MAG-OX) 400 mg tablet, Take 1 tablet (400 mg total) by mouth once daily., Disp: 90 tablet, Rfl: 3    mirabegron (MYRBETRIQ) 50 mg Tb24, Take 1 tablet (50 mg total) by mouth once daily., Disp: 30 tablet, Rfl: 11    nitroGLYCERIN 0.4 MG/DOSE TL SPRY (NITROLINGUAL) 400 mcg/spray spray, PLACE 1 SPRAY UNDER THE TONGUE EVERY 5 MINUTES AS NEEDED FOR CHEST PAIN, Disp: 4.9 g, Rfl: 3    omeprazole (PRILOSEC) 20 MG capsule, Take 20 mg by mouth once daily., Disp: , Rfl:     pantoprazole (PROTONIX) 20 MG tablet, Take 1 tablet (20 mg total) by mouth 2 (two) times daily before  meals., Disp: 60 tablet, Rfl: 5    ranitidine (ZANTAC) 150 MG tablet, Take 1 tablet (150 mg total) by mouth 2 (two) times daily., Disp: 180 tablet, Rfl: 3    salicylic acid (SALACYN) 6 % Crea, Apply 1 application topically 2 (two) times daily. (Patient taking differently: Apply 1 application topically daily as needed. ), Disp: 454 g, Rfl: 11    vit C-vit E-lutein-min-om-3 (OCUVITE) 612-90-4-150 mg-unit-mg-mg Cap, Take 1 capsule by mouth once daily., Disp: , Rfl:     warfarin (COUMADIN) 5 MG tablet, , Disp: , Rfl:     diphth,pertus,acell,,tetanus (BOOSTRIX) 2.5-8-5 Lf-mcg-Lf/0.5mL Syrg injection, Inject 0.5 mLs into the muscle once., Disp: 0.5 mL, Rfl: 0    mupirocin (BACTROBAN) 2 % ointment, Apply topically 3 (three) times daily., Disp: 30 g, Rfl: 3  Assessment:       1. Chronic low back pain, unspecified back pain laterality, with sciatica presence unspecified    2. Venous stasis ulcer    3. Need for Tdap vaccination    4. Need for pneumococcal vaccination    5. Need for hepatitis C screening test    6. Controlled type 2 diabetes mellitus without complication, unspecified long term insulin use status    7. Morbid obesity due to excess calories    8. Other type of osteoarthritis, unspecified site        Plan:      Chronic low back pain, unspecified back pain laterality, with sciatica presence unspecified  Refill [place] request sent to PCP.  Pain contract signed today.    Venous stasis ulcer  Wash with soap and water BID then apply mupirocin  Cover with Mepilex  Use compression stockings and elevate legs when possible  -     mupirocin (BACTROBAN) 2 % ointment; Apply topically 3 (three) times daily.  Dispense: 30 g; Refill: 3    Need for Tdap vaccination    Need for pneumococcal vaccination  -     Pneumococcal Polysaccharide Vaccine (23 Valent) (SQ/IM)    Need for hepatitis C screening test  -     Hepatitis C antibody; Future; Expected date: 2/1/17    Controlled type 2 diabetes mellitus without complication,  unspecified long term insulin use status  -     Hemoglobin A1c; Future; Expected date: 2/1/17  -     Ambulatory referral to Podiatry    Morbid obesity due to excess calories  Attempting to limit calories through smaller portions.  Patient readiness: acceptance and barriers:none    During the course of the visit the patient was educated and counseled about the following:     Diabetes:  Discussed general issues about diabetes pathophysiology and management.  Hypertension:   Medication: no change.  Dietary sodium restriction.  Obesity:   Diet interventions: moderate (500 kCal/d) deficit diet.    Goals: Diabetes: Maintain Hemoglobin A1C below 7, Hypertension: Reduce Blood Pressure and Obesity: Reduce calorie intake and BMI    Did patient meet goals/outcomes: No    The following self management tools provided: declined    Patient Instructions (the written plan) was given to the patient/family.     Time spent with patient: 30 minutes    Other type of osteoarthritis, unspecified site  Stable, continue current treatment.    Other orders  -     diphth,pertus,acell,,tetanus (BOOSTRIX) 2.5-8-5 Lf-mcg-Lf/0.5mL Syrg injection; Inject 0.5 mLs into the muscle once.  Dispense: 0.5 mL; Refill: 0

## 2017-02-01 NOTE — PROGRESS NOTES
Subjective:      Patient ID: Richard Bustos is a 71 y.o. male.    Chief Complaint: Diabetic Foot Exam (AR Care with nails; Bret today) and Foot Ulcer (right leg)    Richard is a 71 y.o. male who presents to the clinic for evaluation and treatment of high risk feet. Richard has a past medical history of Anemia; Anticoagulant long-term use; Aorta aneurysm; Arthritis; Atrial fibrillation; CAD (coronary artery disease); CHF (congestive heart failure); Chronic kidney disease; Colon polyp; Diabetes mellitus; Diabetes mellitus type II; Diverticulosis; Elevated PSA; Encounter for blood transfusion; Former smoker; GERD (gastroesophageal reflux disease); Gout; colonic polyps; Hypercoagulable state; Hyperlipidemia; Hypertension; MI, old (2010); Myocardial infarction; Prostate cancer (06/2016); Sleep apnea; and Venous stasis. The patient's chief complaint is long, thick toenails and wound right leg.  Both conditions, Gradual onset, worsening over past several weeks, aggravated by increased weight bearing, shoe gear, pressure.  No previous medical treatment.  OTC pain med not helping.   This patient has documented high risk feet requiring routine maintenance secondary to diabetes mellitis and those secondary complications of diabetes, as mentioned..    PCP: Familia Ramirez Jr, MD    Date Last Seen by PCP:   Chief Complaint   Patient presents with    Diabetic Foot Exam     AR Care with nails; Bret today    Foot Ulcer     right leg         Current shoe gear:  Affected Foot: Casual shoes     Unaffected Foot: Casual shoes    Hemoglobin A1C   Date Value Ref Range Status   12/15/2014 4.7 4.5 - 6.2 % Final   06/17/2013 6.8 (H) 4.8 - 5.6 % Final     Comment:              Increased risk for diabetes: 5.7 - 6.4           Diabetes: >6.4           Glycemic control for adults with diabetes: <7.0  **In order to standardize %HbA1c results worldwide, as of October 11, 2010,  the %HbA1c is being calculated using the master equation recommended in  the  consensus statement adopted by the ADA (American Diabetes Assoc), EASD  (European Assoc for the Study of Diabetes), IFCC (International Federation  of Clinical Chemistry and Laboratory Medicine) and IDF (International  Diabetes Federation). Result units: %HgbA1c (DCCT/NGSP).  In common with other methods, Hb A1C values may not accurately reflect mean  blood glucose in patients with hemoglobin variants (HgbF, HgbS and HgbC).  Any cause of shortened erythrocyte survival will reduce exposure of  erythrocytes to glucose with a consequent decrease in HbA1c (%) values, even  though the time-averaged blood glucose level may be elevated. Causes of  shortened erythrocyte lifetime might be hemolytic anemia or other hemolytic  diseases, homozygous sickle cell trait, pregnancy, recent significant or  chronic blood loss, etc. Caution should be used when interpreting the HbA1c  results from patients with these conditions.   07/05/2012 5.7 4.0 - 6.2 % Final   03/23/2011 text % Final     Comment:     Hemoglobin A1C:  Possible contamination of HgbA1C by a Hemoglobin variant. This  specimen was sent to [facility] for analysis by an  alternate method.       Review of Systems   Constitution: Negative for chills, fever, malaise/fatigue and night sweats.   Cardiovascular: Positive for leg swelling. Negative for claudication and cyanosis.   Skin: Positive for nail changes and poor wound healing. Negative for color change, itching, rash and suspicious lesions.   Musculoskeletal: Negative for falls, gout, joint pain and myalgias.   Neurological: Positive for paresthesias and sensory change.           Objective:      Physical Exam   Constitutional: He is oriented to person, place, and time. He appears well-developed and well-nourished. He is cooperative.   Oriented to time, place, and person.   Cardiovascular:   Pulses:       Dorsalis pedis pulses are 1+ on the right side, and 1+ on the left side.        Posterior tibial  pulses are 1+ on the right side, and 1+ on the left side.   Capillary fill time 3-5 seconds.  All toes warm to touch.      2+ pitting lower extremity edema bilateral.    Negative elevational pallor and dependent rubor bilateral.     Musculoskeletal:   Normal angle, base, station of gait. Decreased stride length, early heel off, moderately propulsive toe off bilateral.    All ten toes without clubbing, cyanosis, or signs of ischemia.      No pain to palpation bilateral lower extremities.      Range of motion, stability, muscle strength, and muscle tone are age and health appropriate normal bilateral feet and legs.       Lymphadenopathy:   Negative lymphadenopathy bilateral popliteal fossa and tarsal tunnel.     Neurological: He is alert and oriented to person, place, and time. He has normal strength. He is not disoriented. He displays no atrophy and no tremor. A sensory deficit is present. He exhibits normal muscle tone.   Reflex Scores:       Patellar reflexes are 2+ on the right side and 2+ on the left side.       Achilles reflexes are 2+ on the right side and 2+ on the left side.  Decreased/absent vibratory sensation bilateral feet to 128Hz tuning fork.    Paresthesias, and burning bilateral feet with no clearly identified trigger or source.     Skin: Skin is warm, dry and intact. No abrasion, no bruising, no burn, no ecchymosis, no laceration, no lesion, no petechiae and no rash noted. He is not diaphoretic. No cyanosis or erythema. No pallor. Nails show no clubbing.   Wound: anterior lateral right leg    Size:    Pre:[id number]  Post: [id number]    Base:  Granular, pink with moderate serous/serosanaguinous drainage only.  No pus, tracking, fluctuance, malodor, or cardinal signs infection.    Borders:   flat, pink, blanchable skin edges without undermining.    Otherwise, Skin thin, atrophic, with decreased density and distribution of pedal hair bilateral, but without hyperpigmentation, minerva discoloration,  ulcers,  masses, nodules or cords palpated bilateral feet and legs.    Toenails 1st, 2nd, 3rd, 4th, 5th  bilateral are hypertrophic thickened 2-3 mm, dystrophic, discolored tanish brown with tan, gray crumbly subungual debris.  Tender to distal nail plate pressure, without periungual skin abnormality of each.               Assessment:       Encounter Diagnoses   Name Primary?    Leg ulcer, right, with fat layer exposed Yes    Onychomycosis due to dermatophyte     Diabetic polyneuropathy associated with type 2 diabetes mellitus     Peripheral vascular disease     Long-term (current) use of anticoagulants          Plan:       Richard was seen today for diabetic foot exam and foot ulcer.    Diagnoses and all orders for this visit:    Leg ulcer, right, with fat layer exposed  -     X-Ray Tibia Fibula 2 View Right; Future    Onychomycosis due to dermatophyte    Diabetic polyneuropathy associated with type 2 diabetes mellitus  -     X-Ray Tibia Fibula 2 View Right; Future    Peripheral vascular disease  -     X-Ray Tibia Fibula 2 View Right; Future    Long-term (current) use of anticoagulants  -     X-Ray Tibia Fibula 2 View Right; Future      I counseled the patient on his conditions, their implications and medical management.        - Shoe inspection. Diabetic Foot Education. Patient reminded of the importance of good nutrition and blood sugar control to help prevent podiatric complications of diabetes. Patient instructed on proper foot hygeine. We discussed wearing proper shoe gear, daily foot inspections, never walking without protective shoe gear, never putting sharp instruments to feet, routine podiatric nail visits every 2-3 months.      - With patient's permission, nails were aggressively reduced and debrided x 10 to their soft tissue attachment mechanically, removing all offending nail and debris. Patient relates relief following the procedure. He will continue to monitor the areas daily, inspect his feet, wear  protective shoe gear when ambulatory, moisturizer to maintain skin integrity and follow in this office p.r.n.    PREOPERATIVE DIAGNOSIS:  Ulcer anterior lateral right leg.      POSTOPERATIVE DIAGNOSIS:  Same    PROCEDURE:  Excisional debridement of ulcer      SURGEON:  Iván Torrez M.D.      No surgical assist.      ESTIMATED BLOOD LOSS:  Minimal, being less than 1 mL.      HEMOSTASIS:  Anatomic dissection and direct pressure manually.      ANESTHESIA:  Not necessary due to the patient's neuropathic status.      PROCEDURE IN DETAIL:  I used a sterile #15 blade to remove all the nonviable    wound elements from the wound periphery and base.  I redefined  the    wound borders in the process.  Debridement was carried into and did include the    subcutaneous layer.  All the nonviable wound elements were discarded and    red-bagged according to the policy.  Capillary ooze at the wound periphery    and base were easily controlled with direct pressure manually and the wound    today was dressed with Other: wound foam, kerlix, coban.  Pre and    postoperative wound dimensions are contained in the attached progress note and    the patient was discharged today under self care and will follow up here in    one week, sooner p.r.n.    Wear compression stockings as directed by cardiology.    Ambulate to tolerance in shoes of choice.  Monitor both feet multiple times daily for signs of occurrence/recurrence ulceration.    Return in about 1 week (around 2/8/2017) for wound care right leg.

## 2017-02-01 NOTE — PROGRESS NOTES
Patient identified using name and . VIS given to patient and procedure was explained. Patient verbalized understanding. Pneumo administered IM in the right deltoid using sterile technique. No residual bleeding noted. Patient tolerated procedure well.

## 2017-02-01 NOTE — PATIENT INSTRUCTIONS
Diabetes (General Information)  Diabetes is a long-term health problem. It means your body does not make enough insulin. Or it may mean that your body cannot use the insulin it makes. Insulin is a hormone in your body. It lets blood sugar (glucose) reach the cells in your body. All of your cells need glucose for fuel.  When you have diabetes, the glucose in your blood builds up because it cannot get into the cells. This buildup is called high blood sugar (hyperglycemia).  Your blood sugar level depends on several things. It depends on what kind of food you eat and how much of it you eat. It also depends on how much exercise you get, and how much insulin you have in your body. Eating too much of the wrong kinds of food or not taking diabetes medicine on time can cause high blood sugar. Infections can cause high blood sugar even if you are taking medicines correctly.  These things can also cause low blood sugar:  · Missing meals  · Not eating enough food  · Taking too much diabetes medicine  Diabetes can cause serious problems over time if you do not get treated. These problems include heart disease, stroke, kidney failure, and blindness. They also include nerve pain or loss of feeling in your legs and feet, and gangrene of the feet. By keeping your blood sugar under control you can prevent or delay these problems.  Normal blood sugar levels are 80 to 100 before a meal and less than 180 in the 1 to 2 hours after a meal.  Home care  Follow these guidelines when caring for yourself at home:  · Follow the diet your healthcare provider gives you. Take insulin or other diabetes medicine exactly as told to.  · Watch your blood sugar as you are told to. Keep a log of your results. This will help your provider change your medicines to keep your blood sugar under control.  · Try to reach your ideal weight. You may be able to cut back on or not have to take diabetes medicine if you eat the right foods and get exercise.  · Do  not smoke. Smoking worsens the effects of diabetes on your circulation. You are much more likely to have a heart attack if you have diabetes and you smoke.  · Take good care of your feet. If you have lost feeling in your feet, you may not see an injury or infection. Check your feet and between your toes at least once a week.  · Wear a medical alert bracelet or necklace, or carry a card in your wallet that says you have diabetes. This will help healthcare providers give you the right care if you get very ill and cannot tell them that you have diabetes.  Sick day plan  If you get a cold, the flu, or a bacterial or viral infection, take these steps:  · Look at your diabetes sick plan and call your healthcare provider as you were told to. You may need to call your provider right away if:  ¨ Your blood sugar is above 240 while taking your diabetes medicine  ¨ Your urine ketone levels are above normal or high  ¨ You have been vomiting more than 6 hours  ¨ You have trouble breathing or your breath ha s a fruity smell  ¨ You have a high fever  ¨ You have a fever for several days and you are not getting better  ¨ You get light-headed and are sleepier than usual  · Keep taking your diabetes pills (oral medicine) even if you have been vomiting and are feeling sick. Call your provider right away because you may need insulin to lower your blood sugar until you recover from your illness.  · Keep taking your insulin even if you have been vomiting and are feeling sick. Call your provider right away to ask if you need to change your insulin dose. This will depend on your blood sugar results.  · Check your blood sugar every 2 to 4 hours, or at least 4 times a day.  · Check your ketones often. If you are vomiting and having diarrhea, watch them more often.  · Do not skip meals. Try to eat small meals on a regular schedule. Do this even if you do not feel like eating.  · Drink water or other liquids that do not have caffeine or  calories. This will keep you from getting dehydrated. If you are nauseated or vomiting, takes small sips every 5 minutes. To prevent dehydration try to drink a cup (8 ounces) of fluids every hour while you are awake.  General care  Always bring a source of fast-acting sugar with you in case you have symptoms of low blood sugar (below 70). At the first sign of low blood sugar, eat or drink 15 to 20 grams of fast-acting sugar to raise your blood sugar. Examples are:  · 3 to 4 glucose tablets. You can buy these at most [employer].  · 4 ounces (1/2 cup) of regular (not diet) soft drinks  · 4 ounces (1/2 cup) of any fruit juice  · 8 ounces (1 cup) of milk  · 5 to 6 pieces of hard candy  · 1 tablespoon of honey  Check your blood sugar 15 minutes after treating yourself. If it is still below 70, take 15 to 20 more grams of fast-acting sugar. Test again in 15 minutes. If it returns to normal (70 or above), eat a snack or meal to keep your blood sugar in a safe range. If it stays low, call your doctor or go to an emergency room.  Follow-up care  Follow-up with your healthcare provider, or as advised. For more information about diabetes, visit the American Diabetes Association website at www.diabetes.org or call 552-342-4670.  When to seek medical advice  Call your healthcare provider right away if you have any of these symptoms of high blood sugar:  · Frequent urination  · Dizziness  · Drowsiness  · Thirst  · Headache  · Nausea or vomiting  · Abdominal pain  · Eyesight changes  · Fast breathing  · Confusion or loss of consciousness  Also call your provider right away if you have any of these signs of low blood sugar:  · Fatigue  · Headache  · Shakes  · Excess sweating  · Hunger  · Feeling anxious or restless  · Eyesight changes  · Drowsiness  · Weakness  · Confusion or loss of consciousness  Call 911  Call for emergency help right away if any of these occur:  · Chest pain or shortness of breath  · Dizziness or  fainting  · Weakness of an arm or leg or one side of the face  · Trouble speaking or seeing   © 2000-2016 [employer]. [address] [place] [address], [place] [address]. All rights reserved. This information is not intended as a substitute for professional medical care. Always follow your healthcare professional's instructions.        Controlling High Blood Pressure  High blood pressure (hypertension) is often called the silent killer. This is because many people who have it dont know it. High blood pressure is defined as 140/90 mm Hg or higher. Know your blood pressure and remember to check it regularly. Doing so can save your life. Here are some things you can do to help control your blood pressure.    Choose heart-healthy foods  · Select low-salt, low-fat foods. Limit sodium intake to 2,400 mg per day or the amount suggested by your healthcare provider.  · Limit canned, dried, cured, packaged, and fast foods. These can contain a lot of salt.  · Eat 8 to 10 servings of fruits and vegetables every day.  · Choose lean meats, fish, or chicken.  · Eat whole-grain pasta, brown rice, and beans.  · Eat 2 to 3 servings of low-fat or fat-free dairy products.  · Ask your doctor about the DASH eating plan. This plan helps reduce blood pressure.  · When you go to a restaurant, ask that your meal be prepared with no added salt.  Maintain a healthy weight  · Ask your healthcare provider how many calories to eat a day. Then stick to that number.  · Ask your healthcare provider what weight range is healthiest for you. If you are overweight, a weight loss of only 3% to 5% of your body weight can help lower blood pressure. Generally, a good weight loss goal is to lose 10% of your body weight in a year.  · Limit snacks and sweets.  · Get regular exercise.  Get up and get active  · Choose activities you enjoy. Find ones you can do with friends or family. This includes bicycling, dancing, walking, and jogging.  · Park farther  away from building entrances.  · Use stairs instead of the elevator.  · When you can, walk or bike instead of driving.  · [place] leaves, garden, or do household repairs.  · Be active at a moderate to vigorous level of physical activity for at least 40 minutes for a minimum of 3 to 4 days a week.   Manage stress  · Make time to relax and enjoy life. Find time to laugh.  · Communicate your concerns with your loved ones and your healthcare provider.  · Visit with family and friends, and keep up with hobbies.  Limit alcohol and quit smoking  · Men should have no more than 2 drinks per day.  · Women should have no more than 1 drink per day.  · Talk with your healthcare provider about quitting smoking. Smoking significantly increases your risk for heart disease and stroke. Ask your healthcare provider about community smoking cessation programs and other options.  Medicines  If lifestyle changes arent enough, your healthcare provider may prescribe high blood pressure medicine. Take all medicines as prescribed. If you have any questions about your medicines, ask your healthcare provider before stopping or changing them.   © 5645-9654 [employer]. [address] [place] [address]. All rights reserved. This information is not intended as a substitute for professional medical care. Always follow your healthcare professional's instructions.        Weight Management: Getting Started  Healthy bodies come in all shapes and sizes. Not all bodies are made to be thin. For some people, a healthy weight is higher than the average weight listed on weight charts. Your healthcare provider can help you decide on a healthy weight for you.    Reasons to lose weight  Losing weight can help with some health problems, such as high blood pressure, heart disease, diabetes, sleep apnea, and arthritis. You may also feel more energy.  Set your long-term goal  Your goal doesn't even have to be a specific weight. You may decide  on a fitness goal (such as being able to walk 10 miles a week), or a health goal (such as lowering your blood pressure). Choose a goal that is measurable and reasonable, so you know when you've reached it. A goal of reaching a BMI of less than 25 is not always reasonable (or possible).   Make an action plan  Habits dont change overnight. Setting your goals too high can leave you feeling discouraged if you cant reach them. Be realistic. Choose one or two small changes you can make now. Set an action plan for how you are going to make these changes. When you can stick to this plan, keep making a few more small changes. Taking small steps will help you stay on the path to success.  Track your progress  Write down your goals. Then, keep a daily record of your progress. Write down what you eat and how active you are. This record lets you look back on how much youve done. It may also help when youre feeling frustrated. Reward yourself for success. Even if you dont reach every goal, give yourself credit for what you do get done.  Get support  Encouragement from others can help make losing weight easier. Ask your family members and friends for support. They may even want to join you. Also look to your healthcare provider, registered dietitian, and  for help. Your local hospital can give you more information about nutrition, exercise, and weight loss.  © 5075-5589 [employer]. [address] [place] [address], [place] [address]. All rights reserved. This information is not intended as a substitute for professional medical care. Always follow your healthcare professional's instructions.        Walking for Fitness  Fitness walking has something for everyone, even people who are already fit. Walking is one of the safest ways to condition your body aerobically. It can boost energy, help you lose weight, and reduce stress.    Physical benefits  · Walking strengthens your heart and lungs, and tones  your muscles.  · When walking, your feet land with less impact than in other sports. This reduces chances of muscle, bone, and joint injury.  · Regular walking improves your cholesterol levels and lowers your risk of heart disease. And it helps you control your blood sugar if you have diabetes.  · Walking is a weight-bearing activity, which helps maintain bone density. This can help prevent osteoporosis.  Personal rewards  · Taking walks can help you relax and manage stress. And fitness walking may make you feel better about yourself.  · Walking can help you sleep better at night and make you less likely to be depressed.  · Regular walking may help maintain your memory as you get older.  · Walking is a great way to spend extra time with friends and family members. Be sure to invite your dog along!  Q&A about fitness walking  Q: Will walking keep me fit?  A: Yes. Regular walking at the right pace gives you all the benefits of other aerobic activities, such as jogging and swimming.  Q: Will walking help me lose weight and keep it off?  A: Yes. Per mile, walking can burn as many calories as jogging. Your health care provider can help work walking into your weight-loss plan.  Q: Is walking safe for my health?  A: Yes. Walking is safe if you have high blood pressure, diabetes, heart disease, or other conditions. Talk to your health care provider before you start.  © 5672-1240 [employer]. [address] [place] [address]. All rights reserved. This information is not intended as a substitute for professional medical care. Always follow your healthcare professional's instructions.        Venous Leg Ulcer  Arteries carry oxygenated blood from your heart to the rest of your body. Veins return the blood to the heart.  When you sit or stand, blood in the veins in your legs must flow uphill to your heart. When you move your legs while walking, the contraction of your leg muscles has a pumping effect on  the blood in the veins. The veins have one-way valves that stop the blood from flowing backward.  If you have poor blood flow in your veins (venous insufficiency), the one-way valves are damaged. The blood doesnt flow uphill very well. This raises pressure in the veins, and the tissues in your legs dont get enough oxygen. As the problem gets worse, an area of skin near the ankle turns dark and an ulcer forms. This is called a venous stasis ulcer. These ulcers usually dont hurt unless they become infected.  Venous stasis ulcers are treated with compression wraps, antibiotics that you put on your skin, and special dressings. Sometimes you may need a skin graft. Once the ulcer has healed, you will need to use compression stocking to help the pumping action in your veins. The stockings will also reduce swelling.  Home care  Follow these tips when caring for yourself at home:  · Use any special compression dressings or stockings as directed.  · If you were told to change your dressing, follow the instructions your health care provider gave you. Many different kinds of dressings can be used for this problem, and each is used differently.  · Walk regularly. This helps the blood flow better in your legs.  · Maintain a healthy weight. If you are overweight, talk with your provider about a weight loss program.  · If you smoke, quit smoking. This will ease your symptoms and lower the chance that the disease will get worse. Join a stop-smoking program or ask your provider for help in quitting.  · Check your feet and legs for skin breaks or color changes. Report these to your provider. This could be an early sign of an ulcer.  · Wear comfortable, well-fitting shoes and socks. Dont use socks that are tight. If they leave a dent in your leg by the end of the day, they are too tight.  · When standing, shift your weight from one leg to the other.  · When sitting for long periods, put your feet up. Move your feet and ankles often  to get your calf muscles moving. Get up and walk from time to time.  Follow-up care  Follow up with your health care provider.  When to seek medical advice  Call your health care provider right away if any of these occur:  · Pus or discharge coming from the ulcer  · Pain in or around the ulcer  · Redness or swelling of your leg around the ulcer  · Fever of 100.4ºF (38ºC) or higher, or as directed by your health care provider  · Shortness of breath or painful breathing  · Chest pain, repeated cough, or coughing up blood  © 8175-3505 [employer]. [address] [place] [address]. All rights reserved. This information is not intended as a substitute for professional medical care. Always follow your healthcare professional's instructions.

## 2017-02-03 NOTE — TELEPHONE ENCOUNTER
Per Lizy, Mr. Bustos's Alprazolam was approved and a letter was faxed this morning to [phone].

## 2017-02-03 NOTE — TELEPHONE ENCOUNTER
Lizy called needing the info from the previous message.  Without it they will have to do a peer to peer.

## 2017-02-03 NOTE — TELEPHONE ENCOUNTER
1. He has been on this so long there is a risk of withdrawal, and would take a long time to get him off.  2. I have discussed the addiction potential and have told him that he is at max dose.  3. He has been on Xanax since at least 2008, and probably longer.  4. As far as I know he has not been tried on anything else, unless he was tried on something before I started seeing him in [phone].

## 2017-02-10 ENCOUNTER — OFFICE VISIT (OUTPATIENT)
Dept: CARDIOLOGY | Facility: CLINIC | Age: 72
End: 2017-02-10
Payer: MEDICARE

## 2017-02-10 VITALS
SYSTOLIC BLOOD PRESSURE: 138 MMHG | BODY MASS INDEX: 42.63 KG/M2 | DIASTOLIC BLOOD PRESSURE: 72 MMHG | HEART RATE: 52 BPM | OXYGEN SATURATION: 96 % | WEIGHT: 297.81 LBS | HEIGHT: 70 IN

## 2017-02-10 DIAGNOSIS — R53.82 CHRONIC FATIGUE: ICD-10-CM

## 2017-02-10 DIAGNOSIS — I25.10 CORONARY ARTERY DISEASE INVOLVING NATIVE CORONARY ARTERY OF NATIVE HEART WITHOUT ANGINA PECTORIS: Primary | Chronic | ICD-10-CM

## 2017-02-10 DIAGNOSIS — G47.33 OBSTRUCTIVE SLEEP APNEA SYNDROME: Chronic | ICD-10-CM

## 2017-02-10 DIAGNOSIS — E66.01 MORBID OBESITY DUE TO EXCESS CALORIES: Chronic | ICD-10-CM

## 2017-02-10 DIAGNOSIS — I51.9 LV DYSFUNCTION: ICD-10-CM

## 2017-02-10 DIAGNOSIS — G89.29 CHRONIC LOW BACK PAIN, UNSPECIFIED BACK PAIN LATERALITY, WITH SCIATICA PRESENCE UNSPECIFIED: ICD-10-CM

## 2017-02-10 DIAGNOSIS — M54.5 CHRONIC LOW BACK PAIN, UNSPECIFIED BACK PAIN LATERALITY, WITH SCIATICA PRESENCE UNSPECIFIED: ICD-10-CM

## 2017-02-10 DIAGNOSIS — F41.9 ANXIETY: ICD-10-CM

## 2017-02-10 DIAGNOSIS — I10 ESSENTIAL HYPERTENSION: ICD-10-CM

## 2017-02-10 DIAGNOSIS — I77.9 CAROTID DISEASE, BILATERAL: Chronic | ICD-10-CM

## 2017-02-10 PROCEDURE — 1160F RVW MEDS BY RX/DR IN RCRD: CPT | Mod: S$GLB,,, | Performed by: INTERNAL MEDICINE

## 2017-02-10 PROCEDURE — 99499 UNLISTED E&M SERVICE: CPT | Mod: S$PBB,,, | Performed by: INTERNAL MEDICINE

## 2017-02-10 PROCEDURE — 3075F SYST BP GE 130 - 139MM HG: CPT | Mod: S$GLB,,, | Performed by: INTERNAL MEDICINE

## 2017-02-10 PROCEDURE — 99999 PR PBB SHADOW E&M-EST. PATIENT-LVL III: CPT | Mod: PBBFAC,,, | Performed by: INTERNAL MEDICINE

## 2017-02-10 PROCEDURE — 1125F AMNT PAIN NOTED PAIN PRSNT: CPT | Mod: S$GLB,,, | Performed by: INTERNAL MEDICINE

## 2017-02-10 PROCEDURE — 1157F ADVNC CARE PLAN IN RCRD: CPT | Mod: S$GLB,,, | Performed by: INTERNAL MEDICINE

## 2017-02-10 PROCEDURE — 1159F MED LIST DOCD IN RCRD: CPT | Mod: S$GLB,,, | Performed by: INTERNAL MEDICINE

## 2017-02-10 PROCEDURE — 99214 OFFICE O/P EST MOD 30 MIN: CPT | Mod: S$GLB,,, | Performed by: INTERNAL MEDICINE

## 2017-02-10 PROCEDURE — 3078F DIAST BP <80 MM HG: CPT | Mod: S$GLB,,, | Performed by: INTERNAL MEDICINE

## 2017-02-10 RX ORDER — LISINOPRIL 40 MG/1
40 TABLET ORAL NIGHTLY
Qty: 90 TABLET | Refills: 3 | Status: SHIPPED | OUTPATIENT
Start: 2017-02-10 | End: 2017-05-03 | Stop reason: DRUGHIGH

## 2017-02-10 RX ORDER — HYDROCODONE BITARTRATE AND ACETAMINOPHEN 7.5; 325 MG/1; MG/1
1 TABLET ORAL EVERY 6 HOURS PRN
Qty: 90 TABLET | Refills: 0 | Status: SHIPPED | OUTPATIENT
Start: 2017-02-10 | End: 2017-03-06 | Stop reason: SDUPTHER

## 2017-02-10 RX ORDER — ALPRAZOLAM 1 MG/1
1 TABLET ORAL NIGHTLY
Qty: 30 TABLET | Refills: 0 | Status: SHIPPED | OUTPATIENT
Start: 2017-02-10 | End: 2017-03-06 | Stop reason: SDUPTHER

## 2017-02-10 NOTE — TELEPHONE ENCOUNTER
----- Message from Radha Montague sent at 2/10/2017 12:50 PM CST -----  Contact: daughter, Citlali Bustos  Patient needs a refill on Xanax and pain medication, she did not know the name if it, called into [facility] pharmacy at [phone].  Please call patient's daughter, Citlali Bustos at [phone] if you have any questions. Thanks!     [facility] [address] AT [facility] [place] [address]  Phone: [phone] Fax: [phone]

## 2017-02-10 NOTE — MR AVS SNAPSHOT
[facility]  [address]  [place] [address]  Phone: [phone]                  Richard Bustos   2/10/2017 11:30 AM   Office Visit    Description:  Male : 1945   Provider:  Familia Tate MD   Department:  [facility]           Reason for Visit     Results           Diagnoses this Visit        Comments    Coronary artery disease involving native coronary artery of native heart without angina pectoris    -  Primary     Morbid obesity due to excess calories         Carotid disease, bilateral         Essential hypertension         LV dysfunction         Chronic fatigue         Obstructive sleep apnea syndrome                To Do List           Future Appointments        Provider Department Dept Phone    2017 10:00 [facility] [phone]    3/6/2017 10:00 AM Familia Ramirez Jr., MD [place] [facility] Medicine [phone]    2017 11:00 AM LAB, M[facility] [address] [facility] [phone]    2017 9:00 AM Adeline Moss MD [facility] [phone]      Goals (5 Years of Data)     None      Follow-Up and Disposition     Return in about 6 months (around 8/10/2017).    Follow-up and Disposition History       These Medications        Disp Refills Start End    lisinopril (PRINIVIL,ZESTRIL) 40 MG tablet 90 tablet 3 2/10/2017     Take 1 tablet (40 mg total) by mouth every evening. - Oral    Pharmacy: [facility] [id number] [place] [address] [address] AT [facility] [address] Ph #: [phone]       Notes to Pharmacy: **Patient requests 90 days supply**      [facility]     [facility] -  Assistance  Registered nurses in the [facility] provide clinical advisement, health education, appointment booking, and other advisory services.  Call for this free service at [phone].             Medications            Message regarding Medications     Verify the changes and/or additions to your medication regime listed below are the same as discussed with your clinician today.  If any of these changes or additions are incorrect, please notify your healthcare provider.        CHANGE how you are taking these medications     Start Taking Instead of    lisinopril (PRINIVIL,ZESTRIL) 40 MG tablet lisinopril (PRINIVIL,ZESTRIL) 20 MG tablet    Dosage:  Take 1 tablet (40 mg total) by mouth every evening. Dosage:  Take 1 tablet (20 mg total) by mouth once daily.    Reason for Change:  Reorder            Verify that the below list of medications is an accurate representation of the medications you are currently taking.  If none reported, the list may be blank. If incorrect, please contact your healthcare provider. Carry this list with you in case of emergency.           Current Medications     allopurinol (ZYLOPRIM) 100 MG tablet Take 1 tablet (100 mg total) by mouth once daily.    atorvastatin (LIPITOR) 80 MG tablet Take 1 tablet (80 mg total) by mouth once daily.    calcitRIOL (ROCALTROL) 0.5 MCG Cap     carvedilol (COREG) 25 MG tablet Take 1 tablet (25 mg total) by mouth 2 (two) times daily with meals.    ciclopirox (PENLAC) 8 % Soln Apply topically nightly.    ciclopirox 0.77 % Gel Apply topically 2 (two) times daily.    clotrimazole-betamethasone 1-0.05% (LOTRISONE) cream Apply topically 2 (two) times daily. To head of penis    diazepam (VALIUM) 5 MG tablet Take 5 mg by mouth as directed.    ergocalciferol (ERGOCALCIFEROL) 50,000 unit Cap TK 1 C PO Q WEEK    ferrous sulfate 325 (65 FE) MG EC tablet Take 1 tablet (325 mg total) by mouth 2 (two) times daily.    FOLIC ACID/MULTIVIT-MIN/LUTEIN (CENTRUM SILVER ORAL) Take 1 tablet by mouth once daily.    furosemide (LASIX) 40 MG tablet TAKE 1 TABLET BY MOUTH DAILY AS NEEDED    hydrocodone-acetaminophen 7.5-325mg (NORCO) 7.5-325 mg per tablet Take 1 tablet by mouth every 6 (six) hours as  "needed for Pain.    L-METHYL-B6-B12 3-35-2 mg Tab TAKE 1 TABLET BY MOUTH TWICE DAILY    lisinopril (PRINIVIL,ZESTRIL) 40 MG tablet Take 1 tablet (40 mg total) by mouth every evening.    magnesium oxide (MAG-OX) 400 mg tablet Take 1 tablet (400 mg total) by mouth once daily.    mirabegron (MYRBETRIQ) 50 mg Tb24 Take 1 tablet (50 mg total) by mouth once daily.    mupirocin (BACTROBAN) 2 % ointment Apply topically 3 (three) times daily.    nitroGLYCERIN 0.4 MG/DOSE TL SPRY (NITROLINGUAL) 400 mcg/spray spray PLACE 1 SPRAY UNDER THE TONGUE EVERY 5 MINUTES AS NEEDED FOR CHEST PAIN    omeprazole (PRILOSEC) 20 MG capsule Take 20 mg by mouth once daily.    pantoprazole (PROTONIX) 20 MG tablet Take 1 tablet (20 mg total) by mouth 2 (two) times daily before meals.    ranitidine (ZANTAC) 150 MG tablet Take 1 tablet (150 mg total) by mouth 2 (two) times daily.    salicylic acid (SALACYN) 6 % Crea Apply 1 application topically 2 (two) times daily.    vit C-vit E-lutein-min-om-3 (OCUVITE) 661-65-1-150 mg-unit-mg-mg Cap Take 1 capsule by mouth once daily.    warfarin (COUMADIN) 5 MG tablet     alprazolam (XANAX) 1 MG tablet Take 1 tablet (1 mg total) by mouth every evening.           Clinical Reference Information           Your Vitals Were     BP Pulse Height Weight SpO2 BMI    138/72 (BP Location: Left arm) 52 5' 10" (1.778 m) 135.1 kg (297 lb 13.5 oz) 96% 42.74 kg/m2      Blood Pressure          Most Recent Value    Right Arm BP - Sitting  169/80    Left Arm BP - Sitting  138/72    BP  138/72      Allergies as of 2/10/2017     Shellfish Containing Products      Immunizations Administered on Date of Encounter - 2/10/2017     None      Orders Placed During Today's Visit      Normal Orders This Visit    Ambulatory consult to Pulmonology       Language Assistance Services     ATTENTION: Language assistance services are available, free of charge. Please call [phone].      ATENCIÓN: Si jake gore a cruz disposición " servicios gratuitos de asistencia lingüística. Robert guerrero [phone].     ASHLI Ý: N?u b?n nói Ti?ng Vi?t, có các d?ch v? h? tr? ngôn ng? mi?n phí dành cho b?n. G?i s? [phone].         [facility] complies with applicable Federal civil rights laws and does not discriminate on the basis of race, color, national origin, age, disability, or sex.

## 2017-02-13 ENCOUNTER — OFFICE VISIT (OUTPATIENT)
Dept: UROLOGY | Facility: CLINIC | Age: 72
End: 2017-02-13
Payer: MEDICARE

## 2017-02-13 VITALS
HEIGHT: 69 IN | HEART RATE: 61 BPM | TEMPERATURE: 98 F | SYSTOLIC BLOOD PRESSURE: 113 MMHG | BODY MASS INDEX: 44.4 KG/M2 | DIASTOLIC BLOOD PRESSURE: 65 MMHG | WEIGHT: 299.81 LBS

## 2017-02-13 DIAGNOSIS — N32.81 OAB (OVERACTIVE BLADDER): ICD-10-CM

## 2017-02-13 DIAGNOSIS — C61 PROSTATE CANCER: Primary | ICD-10-CM

## 2017-02-13 DIAGNOSIS — N28.89 RENAL MASS: ICD-10-CM

## 2017-02-13 LAB
BILIRUB SERPL-MCNC: NORMAL MG/DL
BLOOD URINE, POC: NORMAL
COLOR, POC UA: YELLOW
GLUCOSE UR QL STRIP: NORMAL
KETONES UR QL STRIP: NORMAL
LEUKOCYTE ESTERASE URINE, POC: NORMAL
NITRITE, POC UA: NORMAL
PH, POC UA: 5
PROTEIN, POC: NORMAL
SPECIFIC GRAVITY, POC UA: 1.01
UROBILINOGEN, POC UA: NORMAL

## 2017-02-13 PROCEDURE — 1159F MED LIST DOCD IN RCRD: CPT | Mod: S$GLB,,, | Performed by: UROLOGY

## 2017-02-13 PROCEDURE — 99999 PR PBB SHADOW E&M-EST. PATIENT-LVL III: CPT | Mod: PBBFAC,,, | Performed by: UROLOGY

## 2017-02-13 PROCEDURE — 3078F DIAST BP <80 MM HG: CPT | Mod: S$GLB,,, | Performed by: UROLOGY

## 2017-02-13 PROCEDURE — 81002 URINALYSIS NONAUTO W/O SCOPE: CPT | Mod: S$GLB,,, | Performed by: UROLOGY

## 2017-02-13 PROCEDURE — 99213 OFFICE O/P EST LOW 20 MIN: CPT | Mod: 25,S$GLB,, | Performed by: UROLOGY

## 2017-02-13 PROCEDURE — 99499 UNLISTED E&M SERVICE: CPT | Mod: S$PBB,,, | Performed by: UROLOGY

## 2017-02-13 PROCEDURE — 1157F ADVNC CARE PLAN IN RCRD: CPT | Mod: S$GLB,,, | Performed by: UROLOGY

## 2017-02-13 PROCEDURE — 3074F SYST BP LT 130 MM HG: CPT | Mod: S$GLB,,, | Performed by: UROLOGY

## 2017-02-13 PROCEDURE — 1160F RVW MEDS BY RX/DR IN RCRD: CPT | Mod: S$GLB,,, | Performed by: UROLOGY

## 2017-02-13 PROCEDURE — 1125F AMNT PAIN NOTED PAIN PRSNT: CPT | Mod: S$GLB,,, | Performed by: UROLOGY

## 2017-02-13 RX ORDER — LISINOPRIL 20 MG/1
20 TABLET ORAL 2 TIMES DAILY
COMMUNITY
Start: 2017-01-29 | End: 2017-06-12 | Stop reason: DRUGHIGH

## 2017-02-13 RX ORDER — CLOPIDOGREL BISULFATE 75 MG/1
TABLET ORAL
COMMUNITY
Start: 2017-02-12 | End: 2017-05-08 | Stop reason: SDUPTHER

## 2017-02-13 NOTE — PROGRESS NOTES
[facility] Progress Note - [place]  Urology Group: Isa/Brijesh/Becca/Galina  PCP: Dr.James newcomb [facility]: inactive    Chief Complaint: Follow-up      Subjective:        HPI: Richard Bustos is a 71 y.o. male presents with     Last seen 12/19/16     Pt was referred to  for Gl 4+4, T2c, N0M0 prostate cancer in mid June. He was also started on ADT, first one given on 6/27/16. Underwent gold seed on 8/1/16. Pt completed 7560 Gy, 42 fractions of IRT (9/1/16-10/31/16). He returns today for psa (<0.01), T (19) and ADT. Trelstar is planned for q6 mo x 2 years total.  Last trelstar 12/19/16    He states he tolerated the radiation but when I saw him in December he was having Frequency q2-3 hours, UUI that occured 3-4x a daily requiring 1 pad a day. Not on any med. Denies straining. Good stream. I started him on myrbetriq 50mg once a day.      Today he states that he has noticed a difference. He says his UUI has improved to 2x a week. No longer wearing any pads. No SE from meds.     AUASSx: 9, pleased  IIEF:11     AUASSx: 17, mixed  IIEF:10      The past medical, surgical, social and family hx were reviewed. There have not been any changes.    Cataracts? removed    Urologic meds: none  Anticoagulation: Yes - coumadin     Objective:     Vitals:    02/13/17 1025   BP: 113/65   Pulse: 61   Temp: 98.2 °F (36.8 °C)          Physical Exam   Constitutional: He is oriented to person, place, and time. He appears well-developed and well-nourished. He is cooperative. No distress.   HENT:   Head: Normocephalic and atraumatic.   Eyes: Pupils are equal, round, and reactive to light.   Cardiovascular: Normal rate and regular rhythm.    Pulmonary/Chest: Effort normal.   Abdominal: Soft. Normal appearance.   Musculoskeletal: Normal range of motion.   Neurological: He is alert and oriented to person, place, and time. He has normal reflexes.   Skin: Skin is intact.     Psychiatric: He has a normal  mood and affect.         Urinalysis: 1.010/5/remainder neg  Labs:    psa     12/12/16 <0.01  8/1/16  3.3  3/18/16 7.7  2/3/15   6.1  8/22/14  5.3  9/26/11  3.9  8/26/10  3.0    Path:     LEFT    RIGHT  BASE   3+3 (1/2),  4+3 (1/2)     MID    4+4 (1/2)    b9  APEX    b9      b9   Date of Biopsy: 5/23/16  PSA: 7.7  Volume: 49.6  Prostate nodule: no    Rads:   1/18/17 rbus  Multiple bilateral renal cysts  4 right renal hemhorragic cysts - 1.8 cm, 6.8 cm, 4.5 cm and 1.6     6/14/16   ctap   Bilateral renal masses  No significant adenopathy  8cm complex fluid collection    Bone scan 6/14/16  Negative    Assessment:       1. Prostate cancer    2. Renal mass    3. OAB (overactive bladder)        Plan:     Prostate cancer  -psa, T and ADT 6 months after last one (12/19/16)  -ADT x 2 years - last one is June 2018  While on ADT will take the following:   -start aldenornate/vit D3 - fosamax 70mg/2800 once a week, SE discussed   -1000mg calcium daily    Renal mass  - rbus done and these were confirmed to be multiple right renal hemhorragic cysts    OAB   Continue myrbetriq doing well on this    F/u 4 months with T, PSA prior for ADT (should be scheduled already)

## 2017-02-13 NOTE — MR AVS SNAPSHOT
[facility]  [address]  [place] [address]  Phone: [phone]                  Richard Bustos   2017 10:00 AM   Office Visit    Description:  Male : 1945   Provider:  Adeline Moss MD   Department:  [facility]           Reason for Visit     Follow-up           Diagnoses this Visit        Comments    Prostate cancer    -  Primary     Renal mass         OAB (overactive bladder)                To Do List           Future Appointments        Provider Department Dept Phone    3/6/2017 10:00 AM Familia Ramirez Jr., MD [place] [facility] [phone]    2017 9:20 AM Rosas Casey MD [facility] - Pulmonary [phone]    2017 11:00 AM LAB, [facility] [address] [facility] [phone]    2017 9:00 AM Adeline Moss MD [facility]rology [phone]      Goals (5 Years of Data)     None      Follow-Up and Disposition     Follow-up and Disposition History       These Medications        Disp Refills Start End    alendronate-vitamin D3 (FOSAMAX PLUS D) 70 mg- 2,800 unit per tablet 4 tablet 6 2017     Take 1 tablet by mouth every 7 days. - Oral    Pharmacy: [facility] [id number] [facility] [address] AT [facility] Ph #: [phone]         [facility]     [facility] Care [facility] -  Assistance  Registered nurses in the [facility] provide clinical advisement, health education, appointment booking, and other advisory services.  Call for this free service at [phone].             Medications           Message regarding Medications     Verify the changes and/or additions to your medication regime listed below are the same as discussed with your clinician today.  If any of these changes or additions are incorrect, please notify your healthcare provider.        START taking these NEW medications        Refills     alendronate-vitamin D3 (FOSAMAX PLUS D) 70 mg- 2,800 unit per tablet 6    Sig: Take 1 tablet by mouth every 7 days.    Class: Normal    Route: Oral           Verify that the below list of medications is an accurate representation of the medications you are currently taking.  If none reported, the list may be blank. If incorrect, please contact your healthcare provider. Carry this list with you in case of emergency.           Current Medications     alendronate-vitamin D3 (FOSAMAX PLUS D) 70 mg- 2,800 unit per tablet Take 1 tablet by mouth every 7 days.    allopurinol (ZYLOPRIM) 100 MG tablet Take 1 tablet (100 mg total) by mouth once daily.    alprazolam (XANAX) 1 MG tablet Take 1 tablet (1 mg total) by mouth every evening.    atorvastatin (LIPITOR) 80 MG tablet Take 1 tablet (80 mg total) by mouth once daily.    calcitRIOL (ROCALTROL) 0.5 MCG Cap     carvedilol (COREG) 25 MG tablet Take 1 tablet (25 mg total) by mouth 2 (two) times daily with meals.    ciclopirox (PENLAC) 8 % Soln Apply topically nightly.    ciclopirox 0.77 % Gel Apply topically 2 (two) times daily.    clopidogrel (PLAVIX) 75 mg tablet     clotrimazole-betamethasone 1-0.05% (LOTRISONE) cream Apply topically 2 (two) times daily. To head of penis    diazepam (VALIUM) 5 MG tablet Take 5 mg by mouth as directed.    ergocalciferol (ERGOCALCIFEROL) 50,000 unit Cap TK 1 C PO Q WEEK    ferrous sulfate 325 (65 FE) MG EC tablet Take 1 tablet (325 mg total) by mouth 2 (two) times daily.    FOLIC ACID/MULTIVIT-MIN/LUTEIN (CENTRUM SILVER ORAL) Take 1 tablet by mouth once daily.    furosemide (LASIX) 40 MG tablet TAKE 1 TABLET BY MOUTH DAILY AS NEEDED    hydrocodone-acetaminophen 7.5-325mg (NORCO) 7.5-325 mg per tablet Take 1 tablet by mouth every 6 (six) hours as needed for Pain.    L-METHYL-B6-B12 3-35-2 mg Tab TAKE 1 TABLET BY MOUTH TWICE DAILY    lisinopril (PRINIVIL,ZESTRIL) 20 MG tablet     lisinopril (PRINIVIL,ZESTRIL) 40 MG tablet Take 1 tablet (40 mg  "total) by mouth every evening.    magnesium oxide (MAG-OX) 400 mg tablet Take 1 tablet (400 mg total) by mouth once daily.    mirabegron (MYRBETRIQ) 50 mg Tb24 Take 1 tablet (50 mg total) by mouth once daily.    nitroGLYCERIN 0.4 MG/DOSE TL SPRY (NITROLINGUAL) 400 mcg/spray spray PLACE 1 SPRAY UNDER THE TONGUE EVERY 5 MINUTES AS NEEDED FOR CHEST PAIN    omeprazole (PRILOSEC) 20 MG capsule Take 20 mg by mouth once daily.    pantoprazole (PROTONIX) 20 MG tablet Take 1 tablet (20 mg total) by mouth 2 (two) times daily before meals.    ranitidine (ZANTAC) 150 MG tablet Take 1 tablet (150 mg total) by mouth 2 (two) times daily.    salicylic acid (SALACYN) 6 % Crea Apply 1 application topically 2 (two) times daily.    vit C-vit E-lutein-min-om-3 (OCUVITE) 104-05-6-150 mg-unit-mg-mg Cap Take 1 capsule by mouth once daily.    warfarin (COUMADIN) 5 MG tablet            Clinical Reference Information           Your Vitals Were     BP Pulse Temp Height Weight BMI    113/65 (BP Location: Left arm, Patient Position: Sitting, BP Method: Automatic) 61 98.2 °F (36.8 °C) (Oral) 5' 9" (1.753 m) 136 kg (299 lb 13.2 oz) 44.28 kg/m2      Blood Pressure          Most Recent Value    BP  113/65      Allergies as of 2/13/2017     Shellfish Containing Products      Immunizations Administered on Date of Encounter - 2/13/2017     None      Language Assistance Services     ATTENTION: Language assistance services are available, free of charge. Please call [phone].      ATENCIÓN: Si habla español, tiene a cruz disposición servicios gratuitos de asistencia lingüística. Llgoldie al [phone].     ASHLI Ý: N?u b?n nói Ti?ng Vi?t, có các d?ch v? h? tr? ngôn ng? mi?n phí dành cho b?n. G?i s? [phone].         [facility] complies with applicable Federal civil rights laws and does not discriminate on the basis of race, color, national origin, age, disability, or sex.        "

## 2017-02-16 ENCOUNTER — HOSPITAL ENCOUNTER (OUTPATIENT)
Dept: RADIOLOGY | Facility: CLINIC | Age: 72
Discharge: HOME | End: 2017-02-16
Attending: PODIATRIST
Payer: MEDICARE

## 2017-02-16 ENCOUNTER — TELEPHONE (OUTPATIENT)
Dept: PODIATRY | Facility: CLINIC | Age: 72
End: 2017-02-16

## 2017-02-16 ENCOUNTER — OFFICE VISIT (OUTPATIENT)
Dept: PODIATRY | Facility: CLINIC | Age: 72
End: 2017-02-16
Payer: MEDICARE

## 2017-02-16 VITALS — HEIGHT: 69 IN | BODY MASS INDEX: 44.28 KG/M2 | WEIGHT: 298.94 LBS

## 2017-02-16 DIAGNOSIS — E11.42 DIABETIC POLYNEUROPATHY ASSOCIATED WITH TYPE 2 DIABETES MELLITUS: ICD-10-CM

## 2017-02-16 DIAGNOSIS — I73.9 PERIPHERAL VASCULAR DISEASE: ICD-10-CM

## 2017-02-16 DIAGNOSIS — L97.529 ULCER OF TOE, LEFT, WITH UNSPECIFIED SEVERITY: ICD-10-CM

## 2017-02-16 DIAGNOSIS — L03.032 CELLULITIS AND ABSCESS OF TOE, LEFT: Primary | ICD-10-CM

## 2017-02-16 DIAGNOSIS — L03.032 CELLULITIS AND ABSCESS OF TOE, LEFT: ICD-10-CM

## 2017-02-16 DIAGNOSIS — L02.612 CELLULITIS AND ABSCESS OF TOE, LEFT: Primary | ICD-10-CM

## 2017-02-16 DIAGNOSIS — L02.612 CELLULITIS AND ABSCESS OF TOE, LEFT: ICD-10-CM

## 2017-02-16 PROCEDURE — 4010F ACE/ARB THERAPY RXD/TAKEN: CPT | Mod: S$GLB,,, | Performed by: PODIATRIST

## 2017-02-16 PROCEDURE — 11044 DBRDMT BONE 1ST 20 SQ CM/<: CPT | Mod: S$GLB,,, | Performed by: PODIATRIST

## 2017-02-16 PROCEDURE — 99999 PR PBB SHADOW E&M-EST. PATIENT-LVL III: CPT | Mod: PBBFAC,,, | Performed by: PODIATRIST

## 2017-02-16 PROCEDURE — 73610 X-RAY EXAM OF ANKLE: CPT | Mod: 26,LT,S$GLB, | Performed by: RADIOLOGY

## 2017-02-16 PROCEDURE — 73630 X-RAY EXAM OF FOOT: CPT | Mod: 26,LT,S$GLB, | Performed by: RADIOLOGY

## 2017-02-16 PROCEDURE — 1125F AMNT PAIN NOTED PAIN PRSNT: CPT | Mod: S$GLB,,, | Performed by: PODIATRIST

## 2017-02-16 PROCEDURE — 2022F DILAT RTA XM EVC RTNOPTHY: CPT | Mod: S$GLB,,, | Performed by: PODIATRIST

## 2017-02-16 PROCEDURE — 1160F RVW MEDS BY RX/DR IN RCRD: CPT | Mod: S$GLB,,, | Performed by: PODIATRIST

## 2017-02-16 PROCEDURE — 1157F ADVNC CARE PLAN IN RCRD: CPT | Mod: S$GLB,,, | Performed by: PODIATRIST

## 2017-02-16 PROCEDURE — 99213 OFFICE O/P EST LOW 20 MIN: CPT | Mod: 25,S$GLB,, | Performed by: PODIATRIST

## 2017-02-16 PROCEDURE — 1159F MED LIST DOCD IN RCRD: CPT | Mod: S$GLB,,, | Performed by: PODIATRIST

## 2017-02-16 PROCEDURE — 73630 X-RAY EXAM OF FOOT: CPT | Mod: TC,PO,LT

## 2017-02-16 PROCEDURE — 3046F HEMOGLOBIN A1C LEVEL >9.0%: CPT | Mod: S$GLB,,, | Performed by: PODIATRIST

## 2017-02-16 PROCEDURE — 99499 UNLISTED E&M SERVICE: CPT | Mod: S$PBB,,, | Performed by: PODIATRIST

## 2017-02-16 RX ORDER — CLINDAMYCIN HYDROCHLORIDE 300 MG/1
300 CAPSULE ORAL
COMMUNITY
Start: 2017-02-13 | End: 2017-02-16

## 2017-02-16 RX ORDER — CLINDAMYCIN HYDROCHLORIDE 300 MG/1
300 CAPSULE ORAL 3 TIMES DAILY
Qty: 30 CAPSULE | Refills: 0 | Status: SHIPPED | OUTPATIENT
Start: 2017-02-16 | End: 2017-02-26

## 2017-02-16 NOTE — TELEPHONE ENCOUNTER
Visit note and referral faxed via [employer] to Dr Long's office with request to be seen within week.

## 2017-02-16 NOTE — TELEPHONE ENCOUNTER
----- Message from Whit Garcia sent at 2/16/2017  3:13 PM CST -----  Contact: daughter Citlali   Daughter Citlali called stating the a prescription for Cipro was suppose to be to [facility] in [facility].    Please call her at  to advise if patient should be taken the medication

## 2017-02-16 NOTE — PROGRESS NOTES
Spoke with pts daughter gave dosing for DDI she will call back with cipro info. She voiced understanding of dosing.

## 2017-02-16 NOTE — PROGRESS NOTES
Subjective:      Patient ID: Richard Bustos is a 71 y.o. male.    Chief Complaint: Foot Ulcer (left 3rd toe)    Wound 3rd toe left.  Gradual onset, worsening over past several days, aggravated by increased weight bearing, shoe gear, pressure.  No previous medical treatment.  OTC pain med not helping.  X-ray in [place] few days ago Dr. Nunes negative.  Taking clindamycin from that visit.  Denies trauma.      Ankle/foot swelling left  Same HPI as above.  No improvement with clindamycin.      Review of Systems   Constitution: Negative for chills, diaphoresis, fever, malaise/fatigue and night sweats.   Cardiovascular: Negative for claudication, cyanosis, leg swelling and syncope.   Skin: Positive for poor wound healing. Negative for color change, dry skin, nail changes, rash, suspicious lesions and unusual hair distribution.   Musculoskeletal: Negative for falls, joint pain, joint swelling, muscle cramps, muscle weakness and stiffness.   Gastrointestinal: Negative for constipation, diarrhea, nausea and vomiting.   Neurological: Negative for brief paralysis, disturbances in coordination, focal weakness, numbness, paresthesias, sensory change and tremors.           Objective:      Physical Exam   Constitutional: He appears well-developed and well-nourished. He is cooperative. No distress.   Cardiovascular:   Pulses:       Popliteal pulses are 2+ on the right side, and 2+ on the left side.        Dorsalis pedis pulses are 1+ on the right side, and 1+ on the left side.        Posterior tibial pulses are 1+ on the right side, and 1+ on the left side.   Capillary refill 3 seconds all toes/distal feet, all toes/both feet warm to touch.      Negative lymphadenopathy bilateral popliteal fossa and tarsal tunnel.     2+ pitting left lower extremity edema foot and ankle only.     Musculoskeletal:        Right ankle: Normal. He exhibits normal range of motion, no swelling, no ecchymosis, no deformity, no laceration and normal  pulse. Achilles tendon normal. Achilles tendon exhibits no pain, no defect and normal Chung's test results.   Normal angle, base, station of gait. All ten toes without clubbing, cyanosis, or signs of ischemia.  No pain to palpation bilateral lower extremities.  Range of motion, stability, muscle strength, and muscle tone normal bilateral feet and legs.     Lymphadenopathy: No inguinal adenopathy noted on the right or left side.   Negative lymphadenopathy bilateral popliteal fossa and tarsal tunnel.   Neurological: He is alert. He has normal strength. He displays no atrophy and no tremor. A sensory deficit is present. He exhibits normal muscle tone. He displays no seizure activity. Gait normal.   Reflex Scores:       Patellar reflexes are 2+ on the right side and 2+ on the left side.       Achilles reflexes are 2+ on the right side and 2+ on the left side.  Diminished/loss of protective sensation all toes bilateral to 10 gram monofilament.     Skin: Skin is warm, dry and intact. No abrasion, no bruising, no burn, no ecchymosis, no laceration, no lesion and no rash noted. He is not diaphoretic. No cyanosis or erythema. No pallor. Nails show no clubbing.     Wound: tip toe left 3rd    Size:    Pre:[id number] - fluctuant callus.  Post: [id number]    Base:  Granular, pink with moderate serous/serosanaguinous drainage only.  About 0.5 mL hansen pus, tracks to bone distal phalanx, localized erythema, edema, pain 3rd toe left.  No malodor.    Borders:  Hyperkeratotic, debriding to flat, pink, blanchable skin edges without undermining.    Otherwise, Skin thin, shiny, atrophic, with decreased density and distribution of pedal hair bilateral, but without hyperpigmentation, minerva discoloration,  ulcers, masses, nodules or cords palpated bilateral feet and legs.               Assessment:       Encounter Diagnoses   Name Primary?    Cellulitis and abscess of toe, left Yes    Ulcer of toe, left, with unspecified severity      Diabetic polyneuropathy associated with type 2 diabetes mellitus     Peripheral vascular disease          Plan:       Richard was seen today for foot ulcer.    Diagnoses and all orders for this visit:    Cellulitis and abscess of toe, left  -     Ambulatory consult to Infectious Disease  -     X-Ray Foot Complete Left; Future  -     Aerobic culture  -     Culture, Anaerobic  -     Basic metabolic panel; Future  -     CBC auto differential; Future  -     C-reactive protein; Future  -     Sedimentation rate, manual; Future  -     X-Ray Ankle Complete Left; Future    Ulcer of toe, left, with unspecified severity  -     Ambulatory consult to Infectious Disease  -     X-Ray Foot Complete Left; Future  -     Aerobic culture  -     Culture, Anaerobic  -     Basic metabolic panel; Future  -     CBC auto differential; Future  -     C-reactive protein; Future  -     Sedimentation rate, manual; Future  -     X-Ray Ankle Complete Left; Future    Diabetic polyneuropathy associated with type 2 diabetes mellitus  -     X-Ray Foot Complete Left; Future  -     Aerobic culture  -     Culture, Anaerobic  -     Basic metabolic panel; Future  -     CBC auto differential; Future  -     C-reactive protein; Future  -     Sedimentation rate, manual; Future  -     X-Ray Ankle Complete Left; Future    Peripheral vascular disease  -     X-Ray Foot Complete Left; Future  -     Aerobic culture  -     Culture, Anaerobic  -     Basic metabolic panel; Future  -     CBC auto differential; Future  -     C-reactive protein; Future  -     Sedimentation rate, manual; Future  -     X-Ray Ankle Complete Left; Future      I counseled the patient on his conditions, their implications and medical management.        Continue clindamycin.  Add cipro for pseudemonas coverage pending approval/input from coumadin clinic.    PREOPERATIVE DIAGNOSIS:  Ulcer left foot.      POSTOPERATIVE DIAGNOSIS:  Same    PROCEDURE:  Excisional debridement of ulcer      SURGEON:   Iván Torrez M.D.      No surgical assist.      ESTIMATED BLOOD LOSS:  Minimal, being less than 1 mL.      HEMOSTASIS:  Anatomic dissection and direct pressure manually.      ANESTHESIA:  Not necessary due to the patient's neuropathic status.      PROCEDURE IN DETAIL:  I used a sterile #15 blade to remove all the nonviable    wound elements from the wound periphery and base.  I redefined  the    wound borders in the process.  Debridement was carried into and did include the    bone layer.  All the nonviable wound elements were discarded and    red-bagged according to the policy.  Capillary ooze at the wound periphery    and base were easily controlled with direct pressure manually and the wound    today was dressed with Other: nicole, 4x4, kerlix - change same daily - every other day.  Pre and    postoperative wound dimensions are contained in the attached progress note and    the patient was discharged today under self care and will follow up here in    one week, sooner p.r.n.    X-rays, ID consult, bloodwork.    Small piece of bone taken from distal phalanx after irrigation and debridement for culture / sensitivity.        Return in about 1 week (around 2/23/2017) for wound 3rd toe left.

## 2017-02-16 NOTE — MR AVS SNAPSHOT
[facility]  [address] [place] [address]  Phone: [phone]                  Richard Bustos   2017 7:15 AM   Office Visit    Description:  Male : 1945   Provider:  Iván Torrez DPM   Department:  [place] [facility]           Reason for Visit     Foot Ulcer           Diagnoses this Visit        Comments    Cellulitis and abscess of toe, left    -  Primary     Ulcer of toe, left, with unspecified severity         Diabetic polyneuropathy associated with type 2 diabetes mellitus         Peripheral vascular disease                To Do List           Future Appointments        Provider Department Dept Phone    2017 8:00 AM [facility] XR1 [facility] X[facility]Ray [phone]    2017 8:15 AM [facility] XR1 [facility] [phone]    2017 11:00 AM LAB, [facility] Lab [phone]    2017 11:15 AM Iván Torrez DPM [facility] [phone]    3/6/2017 10:00 AM Familia Ramirez Jr., MD [place] [facility] [phone]      Goals (5 Years of Data)     None      Follow-Up and Disposition     Return in about 1 week (around 2017) for wound 3rd toe left.      [facility]     [facility] On Call Nurse Care Line - 24/7 Assistance  Registered nurses in the [facility] On Call Center provide clinical advisement, health education, appointment booking, and other advisory services.  Call for this free service at [phone].             Medications           Message regarding Medications     Verify the changes and/or additions to your medication regime listed below are the same as discussed with your clinician today.  If any of these changes or additions are incorrect, please notify your healthcare provider.             Verify that the below list of medications is an accurate representation of the medications you are currently taking.  If none reported, the list may be blank. If incorrect, please contact your healthcare provider. Carry this list  with you in case of emergency.           Current Medications     alendronate-vitamin D3 (FOSAMAX PLUS D) 70 mg- 2,800 unit per tablet Take 1 tablet by mouth every 7 days.    allopurinol (ZYLOPRIM) 100 MG tablet Take 1 tablet (100 mg total) by mouth once daily.    alprazolam (XANAX) 1 MG tablet Take 1 tablet (1 mg total) by mouth every evening.    atorvastatin (LIPITOR) 80 MG tablet Take 1 tablet (80 mg total) by mouth once daily.    calcitRIOL (ROCALTROL) 0.5 MCG Cap     carvedilol (COREG) 25 MG tablet Take 1 tablet (25 mg total) by mouth 2 (two) times daily with meals.    ciclopirox (PENLAC) 8 % Soln Apply topically nightly.    ciclopirox 0.77 % Gel Apply topically 2 (two) times daily.    clindamycin (CLEOCIN) 300 MG capsule 300 mg.    clopidogrel (PLAVIX) 75 mg tablet     clotrimazole-betamethasone 1-0.05% (LOTRISONE) cream Apply topically 2 (two) times daily. To head of penis    diazepam (VALIUM) 5 MG tablet Take 5 mg by mouth as directed.    ergocalciferol (ERGOCALCIFEROL) 50,000 unit Cap TK 1 C PO Q WEEK    ferrous sulfate 325 (65 FE) MG EC tablet Take 1 tablet (325 mg total) by mouth 2 (two) times daily.    FOLIC ACID/MULTIVIT-MIN/LUTEIN (CENTRUM SILVER ORAL) Take 1 tablet by mouth once daily.    furosemide (LASIX) 40 MG tablet TAKE 1 TABLET BY MOUTH DAILY AS NEEDED    hydrocodone-acetaminophen 7.5-325mg (NORCO) 7.5-325 mg per tablet Take 1 tablet by mouth every 6 (six) hours as needed for Pain.    L-METHYL-B6-B12 3-35-2 mg Tab TAKE 1 TABLET BY MOUTH TWICE DAILY    lisinopril (PRINIVIL,ZESTRIL) 20 MG tablet     lisinopril (PRINIVIL,ZESTRIL) 40 MG tablet Take 1 tablet (40 mg total) by mouth every evening.    magnesium oxide (MAG-OX) 400 mg tablet Take 1 tablet (400 mg total) by mouth once daily.    mirabegron (MYRBETRIQ) 50 mg Tb24 Take 1 tablet (50 mg total) by mouth once daily.    nitroGLYCERIN 0.4 MG/DOSE TL SPRY (NITROLINGUAL) 400 mcg/spray spray PLACE 1 SPRAY UNDER THE TONGUE EVERY 5 MINUTES AS NEEDED FOR  "CHEST PAIN    omeprazole (PRILOSEC) 20 MG capsule Take 20 mg by mouth once daily.    pantoprazole (PROTONIX) 20 MG tablet Take 1 tablet (20 mg total) by mouth 2 (two) times daily before meals.    ranitidine (ZANTAC) 150 MG tablet Take 1 tablet (150 mg total) by mouth 2 (two) times daily.    salicylic acid (SALACYN) 6 % Crea Apply 1 application topically 2 (two) times daily.    vit C-vit E-lutein-min-om-3 (OCUVITE) 401-17-3-150 mg-unit-mg-mg Cap Take 1 capsule by mouth once daily.    warfarin (COUMADIN) 5 MG tablet            Clinical Reference Information           Your Vitals Were     Height Weight BMI          5' 9" (1.753 m) 135.6 kg (298 lb 15.1 oz) 44.15 kg/m2        Allergies as of 2/16/2017     Shellfish Containing Products      Immunizations Administered on Date of Encounter - 2/16/2017     None      Orders Placed During Today's Visit      Normal Orders This Visit    Aerobic culture     Ambulatory consult to Infectious Disease     Culture, Anaerobic     Future Labs/Procedures Expected by Expires    Basic metabolic panel  2/16/2017 4/17/2018    C-reactive protein  2/16/2017 4/17/2018    CBC auto differential  2/16/2017 4/17/2018    Sedimentation rate, manual  2/16/2017 4/17/2018    X-Ray Ankle Complete Left  2/16/2017 2/16/2018    X-Ray Foot Complete Left  2/16/2017 2/17/2018      Language Assistance Services     ATTENTION: Language assistance services are available, free of charge. Please call [phone].      ATENCIÓN: Si habla español, tiene a cruz disposición servicios gratuitos de asistencia lingüística. Llame al [phone].     CHÚ Ý: N?u b?n nói Ti?ng Vi?t, có các d?ch v? h? tr? ngôn ng? mi?n phí dành cho b?n. G?i s? [phone].         [place] complies with applicable Federal civil rights laws and does not discriminate on the basis of race, color, national origin, age, disability, or sex.        "

## 2017-02-16 NOTE — PROGRESS NOTES
Decrease dose to 2.5mg daily due to DDI with Cipro, need to know what dose patient was prescribed and for how many days. Check INR on 2/20 as scheduled.

## 2017-02-17 NOTE — TELEPHONE ENCOUNTER
Spoke with Nasra with Dr Long's office she requested that medical records, Labs and radiology report be faxed. She will call patient for an appointment to be seen next week.

## 2017-02-18 LAB — BACTERIA SPEC AEROBE CULT: NORMAL

## 2017-02-20 ENCOUNTER — TELEPHONE (OUTPATIENT)
Dept: PODIATRY | Facility: CLINIC | Age: 72
End: 2017-02-20

## 2017-02-20 ENCOUNTER — OFFICE VISIT (OUTPATIENT)
Dept: PODIATRY | Facility: CLINIC | Age: 72
End: 2017-02-20
Payer: MEDICARE

## 2017-02-20 ENCOUNTER — TELEPHONE (OUTPATIENT)
Dept: FAMILY MEDICINE | Facility: CLINIC | Age: 72
End: 2017-02-20

## 2017-02-20 VITALS — HEIGHT: 69 IN | BODY MASS INDEX: 44.28 KG/M2 | WEIGHT: 298.94 LBS

## 2017-02-20 DIAGNOSIS — M86.9 OSTEOMYELITIS OF ANKLE OR FOOT: ICD-10-CM

## 2017-02-20 DIAGNOSIS — E11.42 DIABETIC POLYNEUROPATHY ASSOCIATED WITH TYPE 2 DIABETES MELLITUS: ICD-10-CM

## 2017-02-20 DIAGNOSIS — I73.9 PERIPHERAL VASCULAR DISEASE: ICD-10-CM

## 2017-02-20 DIAGNOSIS — L97.529 ULCER OF TOE, LEFT, WITH UNSPECIFIED SEVERITY: Primary | ICD-10-CM

## 2017-02-20 LAB
BACTERIA SPEC ANAEROBE CULT: NORMAL
BACTERIA SPEC ANAEROBE CULT: NORMAL

## 2017-02-20 PROCEDURE — 99999 PR PBB SHADOW E&M-EST. PATIENT-LVL II: CPT | Mod: PBBFAC,,, | Performed by: PODIATRIST

## 2017-02-20 PROCEDURE — 99499 UNLISTED E&M SERVICE: CPT | Mod: S$PBB,,, | Performed by: PODIATRIST

## 2017-02-20 PROCEDURE — 11042 DBRDMT SUBQ TIS 1ST 20SQCM/<: CPT | Mod: S$GLB,,, | Performed by: PODIATRIST

## 2017-02-20 PROCEDURE — 99499 UNLISTED E&M SERVICE: CPT | Mod: S$GLB,,, | Performed by: PODIATRIST

## 2017-02-20 RX ORDER — DOXYCYCLINE 100 MG/1
100 CAPSULE ORAL EVERY 12 HOURS
Qty: 8 CAPSULE | Refills: 0 | Status: SHIPPED | OUTPATIENT
Start: 2017-02-20 | End: 2017-05-03 | Stop reason: ALTCHOICE

## 2017-02-20 NOTE — MR AVS SNAPSHOT
[facility]  [address] [address]  Phone: [phone]                  Richard Bustos   2017 11:15 AM   Office Visit    Description:  Male : 1945   Provider:  Iván Torrez DPM   Department:  [facility]           Reason for Visit     Foot Ulcer           Diagnoses this Visit        Comments    Ulcer of toe, left, with unspecified severity    -  Primary     Osteomyelitis of ankle or foot         Diabetic polyneuropathy associated with type 2 diabetes mellitus         Peripheral vascular disease                To Do List           Future Appointments        Provider Department Dept Phone    3/2/2017 11:30 AM Iván Torrez DPM [facility] [phone]    3/6/2017 10:00 AM Familia Ramirez Jr., MD [facility] [phone]    2017 9:20 AM Rosas Casey MD [facility] [phone]    2017 11:00 AM LAB, [facility] [address] [facility] [phone]    2017 9:00 AM Adeline Moss MD [facility] [phone]      Goals (5 Years of Data)     None      Follow-Up and Disposition     Return in about 1 week (around 2017) for wound care.      [facility]     [facility] Care [facility] - 24/7 Assistance  Registered nurses in the [facility] provide clinical advisement, health education, appointment booking, and other advisory services.  Call for this free service at [phone].             Medications           Message regarding Medications     Verify the changes and/or additions to your medication regime listed below are the same as discussed with your clinician today.  If any of these changes or additions are incorrect, please notify your healthcare provider.             Verify that the below list of medications is an accurate representation of the medications you are currently taking.  If none reported, the list may be blank. If incorrect, please contact your  healthcare provider. Carry this list with you in case of emergency.           Current Medications     alendronate-vitamin D3 (FOSAMAX PLUS D) 70 mg- 2,800 unit per tablet Take 1 tablet by mouth every 7 days.    allopurinol (ZYLOPRIM) 100 MG tablet Take 1 tablet (100 mg total) by mouth once daily.    alprazolam (XANAX) 1 MG tablet Take 1 tablet (1 mg total) by mouth every evening.    atorvastatin (LIPITOR) 80 MG tablet Take 1 tablet (80 mg total) by mouth once daily.    calcitRIOL (ROCALTROL) 0.5 MCG Cap     carvedilol (COREG) 25 MG tablet Take 1 tablet (25 mg total) by mouth 2 (two) times daily with meals.    ciclopirox (PENLAC) 8 % Soln Apply topically nightly.    ciclopirox 0.77 % Gel Apply topically 2 (two) times daily.    clindamycin (CLEOCIN) 300 MG capsule Take 1 capsule (300 mg total) by mouth 3 (three) times daily.    clopidogrel (PLAVIX) 75 mg tablet     clotrimazole-betamethasone 1-0.05% (LOTRISONE) cream Apply topically 2 (two) times daily. To head of penis    diazepam (VALIUM) 5 MG tablet Take 5 mg by mouth as directed.    ergocalciferol (ERGOCALCIFEROL) 50,000 unit Cap TK 1 C PO Q WEEK    ferrous sulfate 325 (65 FE) MG EC tablet Take 1 tablet (325 mg total) by mouth 2 (two) times daily.    FOLIC ACID/MULTIVIT-MIN/LUTEIN (CENTRUM SILVER ORAL) Take 1 tablet by mouth once daily.    furosemide (LASIX) 40 MG tablet TAKE 1 TABLET BY MOUTH DAILY AS NEEDED    hydrocodone-acetaminophen 7.5-325mg (NORCO) 7.5-325 mg per tablet Take 1 tablet by mouth every 6 (six) hours as needed for Pain.    L-METHYL-B6-B12 3-35-2 mg Tab TAKE 1 TABLET BY MOUTH TWICE DAILY    lisinopril (PRINIVIL,ZESTRIL) 20 MG tablet     lisinopril (PRINIVIL,ZESTRIL) 40 MG tablet Take 1 tablet (40 mg total) by mouth every evening.    magnesium oxide (MAG-OX) 400 mg tablet Take 1 tablet (400 mg total) by mouth once daily.    mirabegron (MYRBETRIQ) 50 mg Tb24 Take 1 tablet (50 mg total) by mouth once daily.    nitroGLYCERIN 0.4 MG/DOSE TL SPRY  "(NITROLINGUAL) 400 mcg/spray spray PLACE 1 SPRAY UNDER THE TONGUE EVERY 5 MINUTES AS NEEDED FOR CHEST PAIN    omeprazole (PRILOSEC) 20 MG capsule Take 20 mg by mouth once daily.    pantoprazole (PROTONIX) 20 MG tablet Take 1 tablet (20 mg total) by mouth 2 (two) times daily before meals.    ranitidine (ZANTAC) 150 MG tablet Take 1 tablet (150 mg total) by mouth 2 (two) times daily.    salicylic acid (SALACYN) 6 % Crea Apply 1 application topically 2 (two) times daily.    vit C-vit E-lutein-min-om-3 (OCUVITE) 518-71-7-150 mg-unit-mg-mg Cap Take 1 capsule by mouth once daily.    warfarin (COUMADIN) 5 MG tablet            Clinical Reference Information           Your Vitals Were     Height Weight BMI          5' 9" (1.753 m) 135.6 kg (298 lb 15.1 oz) 44.15 kg/m2        Allergies as of 2/20/2017     Shellfish Containing Products      Immunizations Administered on Date of Encounter - 2/20/2017     None      Language Assistance Services     ATTENTION: Language assistance services are available, free of charge. Please call [phone].      ATENCIÓN: Si caroline zeng, tiene a cruz disposición servicios gratuitos de asistencia lingüística. Llame al [phone].     CHÚ Ý: N?u b?n nói Ti?ng Vi?t, có các d?ch v? h? tr? ngôn ng? mi?n phí dành cho b?n. G?i s? [phone].         [facility] complies with applicable Federal civil rights laws and does not discriminate on the basis of race, color, national origin, age, disability, or sex.        "

## 2017-02-20 NOTE — PROGRESS NOTES
Subjective:      Patient ID: Richard Bustos is a 71 y.o. male.    Chief Complaint: Foot Ulcer (left)    Wound 3rd toe left.  Gradual onset, worsening over past several days, aggravated by increased weight bearing, shoe gear, pressure.  Dressing with gauze, kerlix daily.  Cultures show MRSA sensitive to clindamycin, doxycycline, bactrim. vanc.  Ankle/foot swelling left well improved since last visit.  Toe still red, though.      Review of Systems   Constitution: Negative for chills, diaphoresis, fever, malaise/fatigue and night sweats.   Cardiovascular: Negative for claudication, cyanosis, leg swelling and syncope.   Skin: Positive for poor wound healing. Negative for color change, dry skin, nail changes, rash, suspicious lesions and unusual hair distribution.   Musculoskeletal: Negative for falls, joint pain, joint swelling, muscle cramps, muscle weakness and stiffness.   Gastrointestinal: Negative for constipation, diarrhea, nausea and vomiting.   Neurological: Negative for brief paralysis, disturbances in coordination, focal weakness, numbness, paresthesias, sensory change and tremors.           Objective:      Physical Exam   Constitutional: He appears well-developed and well-nourished. He is cooperative. No distress.   Cardiovascular:   Pulses:       Popliteal pulses are 2+ on the right side, and 2+ on the left side.        Dorsalis pedis pulses are 1+ on the right side, and 1+ on the left side.        Posterior tibial pulses are 1+ on the right side, and 1+ on the left side.   Capillary refill 3 seconds all toes/distal feet, all toes/both feet warm to touch.      Negative lymphadenopathy bilateral popliteal fossa and tarsal tunnel.     2+ pitting left lower extremity edema foot and ankle only.     Musculoskeletal:        Right ankle: Normal. He exhibits normal range of motion, no swelling, no ecchymosis, no deformity, no laceration and normal pulse. Achilles tendon normal. Achilles tendon exhibits no pain, no  defect and normal Chung's test results.   Normal angle, base, station of gait. All ten toes without clubbing, cyanosis, or signs of ischemia.  No pain to palpation bilateral lower extremities.  Range of motion, stability, muscle strength, and muscle tone normal bilateral feet and legs.     Lymphadenopathy: No inguinal adenopathy noted on the right or left side.   Negative lymphadenopathy bilateral popliteal fossa and tarsal tunnel.   Neurological: He is alert. He has normal strength. He displays no atrophy and no tremor. A sensory deficit is present. He exhibits normal muscle tone. He displays no seizure activity. Gait normal.   Reflex Scores:       Patellar reflexes are 2+ on the right side and 2+ on the left side.       Achilles reflexes are 2+ on the right side and 2+ on the left side.  Diminished/loss of protective sensation all toes bilateral to 10 gram monofilament.     Skin: Skin is warm, dry and intact. No abrasion, no bruising, no burn, no ecchymosis, no laceration, no lesion and no rash noted. He is not diaphoretic. No cyanosis or erythema. No pallor. Nails show no clubbing.     Wound: tip toe left 3rd    Size:    Pre:2x2x2 mm  Post: [id number]    Base:  Granular, pink with moderate serous/serosanaguinous drainage only.   without   pus,  fluctuance, malodor.   localized erythema, edema, pain 3rd toe left.  No malodor.    Borders:   flat, pink, blanchable skin edges without undermining.    Otherwise, Skin thin, shiny, atrophic, with decreased density and distribution of pedal hair bilateral, but without hyperpigmentation, minerva discoloration,  ulcers, masses, nodules or cords palpated bilateral feet and legs.               Assessment:       Encounter Diagnoses   Name Primary?    Ulcer of toe, left, with unspecified severity Yes    Osteomyelitis of ankle or foot     Diabetic polyneuropathy associated with type 2 diabetes mellitus     Peripheral vascular disease          Plan:       Richard was seen today  for foot ulcer.    Diagnoses and all orders for this visit:    Ulcer of toe, left, with unspecified severity    Osteomyelitis of ankle or foot    Diabetic polyneuropathy associated with type 2 diabetes mellitus    Peripheral vascular disease      I counseled the patient on his conditions, their implications and medical management.        Continue clindamycin.  Add cipro for pseudemonas coverage pending approval/input from coumadin clinic.    PREOPERATIVE DIAGNOSIS:  Ulcer left foot.      POSTOPERATIVE DIAGNOSIS:  Same    PROCEDURE:  Excisional debridement of ulcer      SURGEON:  Iván Torrez M.D.      No surgical assist.      ESTIMATED BLOOD LOSS:  Minimal, being less than 1 mL.      HEMOSTASIS:  Anatomic dissection and direct pressure manually.      ANESTHESIA:  Not necessary due to the patient's neuropathic status.      PROCEDURE IN DETAIL:  I used a sterile #15 blade to remove all the nonviable    wound elements from the wound periphery and base.  I redefined  the    wound borders in the process.  Debridement was carried into and did include the    subcutaneous layer.  All the nonviable wound elements were discarded and    red-bagged according to the policy.  Capillary ooze at the wound periphery    and base were easily controlled with direct pressure manually and the wound    today was dressed with Other: nicole, 4x4, kerlix - change same- every other day.  Pre and    postoperative wound dimensions are contained in the attached progress note and    the patient was discharged today under self care and will follow up here in    one week, sooner p.r.n.    Keep ID appointment Wednesday.    Stop clindamycin - not doing enough clinically.    Rx doxycycline bid until ID appointment.            Return in about 1 week (around 2/27/2017) for wound care.

## 2017-02-20 NOTE — TELEPHONE ENCOUNTER
----- Message from Carrie Allen sent at 2/17/2017 10:11 AM CST -----  Contact: Citlali - daughter  Patient daughter is requesting a call back concerning her conversation yesterday regarding medication Cipro, contact her at [phone].    Thank you

## 2017-02-20 NOTE — TELEPHONE ENCOUNTER
----- Message from Amirah Hinojosa sent at 2/20/2017 12:01 PM CST -----  Contact: Elpidio with [facility]  Elpidio with [facility] called advising that patient is requesting CPAP supplies.  However, she needs a recent order.  She would like them faxed to [phone].  Please call [phone] with any questions or concerns.  Thank you!

## 2017-02-21 LAB — INR PPP: 1.9

## 2017-02-22 ENCOUNTER — ANTI-COAG VISIT (OUTPATIENT)
Dept: CARDIOLOGY | Facility: CLINIC | Age: 72
End: 2017-02-22

## 2017-02-22 DIAGNOSIS — Z79.01 LONG TERM (CURRENT) USE OF ANTICOAGULANTS: ICD-10-CM

## 2017-02-22 DIAGNOSIS — D68.59 HYPERCOAGULABLE STATE: ICD-10-CM

## 2017-03-01 DIAGNOSIS — K21.9 GASTROESOPHAGEAL REFLUX DISEASE, ESOPHAGITIS PRESENCE NOT SPECIFIED: ICD-10-CM

## 2017-03-01 DIAGNOSIS — M10.9 ACUTE GOUT OF FOOT, UNSPECIFIED CAUSE, UNSPECIFIED LATERALITY: ICD-10-CM

## 2017-03-01 DIAGNOSIS — I48.0 PAROXYSMAL ATRIAL FIBRILLATION: ICD-10-CM

## 2017-03-01 DIAGNOSIS — Z15.89 MTHFR MUTATION (METHYLENETETRAHYDROFOLATE REDUCTASE): ICD-10-CM

## 2017-03-01 DIAGNOSIS — E78.5 HYPERLIPIDEMIA: ICD-10-CM

## 2017-03-01 RX ORDER — ALLOPURINOL 100 MG/1
TABLET ORAL
Qty: 90 TABLET | Refills: 3 | Status: SHIPPED | OUTPATIENT
Start: 2017-03-01 | End: 2018-03-14 | Stop reason: SDUPTHER

## 2017-03-01 RX ORDER — WARFARIN SODIUM 5 MG/1
TABLET ORAL
Qty: 90 TABLET | Refills: 3 | Status: SHIPPED | OUTPATIENT
Start: 2017-03-01 | End: 2018-03-08 | Stop reason: SDUPTHER

## 2017-03-01 RX ORDER — ATORVASTATIN CALCIUM 80 MG/1
TABLET, FILM COATED ORAL
Qty: 90 TABLET | Refills: 3 | Status: SHIPPED | OUTPATIENT
Start: 2017-03-01 | End: 2018-03-08 | Stop reason: SDUPTHER

## 2017-03-02 ENCOUNTER — DOCUMENTATION ONLY (OUTPATIENT)
Dept: FAMILY MEDICINE | Facility: CLINIC | Age: 72
End: 2017-03-02

## 2017-03-02 ENCOUNTER — OFFICE VISIT (OUTPATIENT)
Dept: PODIATRY | Facility: CLINIC | Age: 72
End: 2017-03-02
Payer: MEDICARE

## 2017-03-02 ENCOUNTER — LAB VISIT (OUTPATIENT)
Dept: LAB | Facility: HOSPITAL | Age: 72
End: 2017-03-02
Attending: INTERNAL MEDICINE
Payer: MEDICARE

## 2017-03-02 VITALS — BODY MASS INDEX: 44.31 KG/M2 | HEIGHT: 69 IN | WEIGHT: 299.19 LBS

## 2017-03-02 DIAGNOSIS — D62 ACUTE POSTHEMORRHAGIC ANEMIA: ICD-10-CM

## 2017-03-02 DIAGNOSIS — D68.59 PRIMARY HYPERCOAGULABLE STATE: ICD-10-CM

## 2017-03-02 DIAGNOSIS — E11.42 DIABETIC POLYNEUROPATHY ASSOCIATED WITH TYPE 2 DIABETES MELLITUS: ICD-10-CM

## 2017-03-02 DIAGNOSIS — M54.5 CHRONIC LOW BACK PAIN, UNSPECIFIED BACK PAIN LATERALITY, WITH SCIATICA PRESENCE UNSPECIFIED: ICD-10-CM

## 2017-03-02 DIAGNOSIS — K66.1: ICD-10-CM

## 2017-03-02 DIAGNOSIS — F41.9 ANXIETY: ICD-10-CM

## 2017-03-02 DIAGNOSIS — L97.529 ULCER OF TOE, LEFT, WITH UNSPECIFIED SEVERITY: Primary | ICD-10-CM

## 2017-03-02 DIAGNOSIS — I73.9 PERIPHERAL VASCULAR DISEASE: ICD-10-CM

## 2017-03-02 DIAGNOSIS — G89.29 CHRONIC LOW BACK PAIN, UNSPECIFIED BACK PAIN LATERALITY, WITH SCIATICA PRESENCE UNSPECIFIED: ICD-10-CM

## 2017-03-02 DIAGNOSIS — C61 MALIGNANT NEOPLASM OF PROSTATE: Primary | ICD-10-CM

## 2017-03-02 DIAGNOSIS — M86.9 OSTEOMYELITIS OF ANKLE OR FOOT: ICD-10-CM

## 2017-03-02 LAB
ALBUMIN SERPL BCP-MCNC: 3 G/DL
ALP SERPL-CCNC: 93 U/L
ALT SERPL W/O P-5'-P-CCNC: 23 U/L
ANION GAP SERPL CALC-SCNC: 7 MMOL/L
AST SERPL-CCNC: 25 U/L
BASOPHILS # BLD AUTO: 0 K/UL
BASOPHILS NFR BLD: 0.2 %
BILIRUB SERPL-MCNC: 0.4 MG/DL
BUN SERPL-MCNC: 27 MG/DL
CALCIUM SERPL-MCNC: 9.7 MG/DL
CHLORIDE SERPL-SCNC: 110 MMOL/L
CO2 SERPL-SCNC: 26 MMOL/L
CREAT SERPL-MCNC: 1.5 MG/DL
DIFFERENTIAL METHOD: ABNORMAL
EOSINOPHIL # BLD AUTO: 0.1 K/UL
EOSINOPHIL NFR BLD: 1.3 %
ERYTHROCYTE [DISTWIDTH] IN BLOOD BY AUTOMATED COUNT: 14.8 %
EST. GFR  (AFRICAN AMERICAN): 54 ML/MIN/1.73 M^2
EST. GFR  (NON AFRICAN AMERICAN): 47 ML/MIN/1.73 M^2
GLUCOSE SERPL-MCNC: 163 MG/DL
HCT VFR BLD AUTO: 32.7 %
HGB BLD-MCNC: 10.9 G/DL
LYMPHOCYTES # BLD AUTO: 0.6 K/UL
LYMPHOCYTES NFR BLD: 10.1 %
MCH RBC QN AUTO: 33.3 PG
MCHC RBC AUTO-ENTMCNC: 33.4 %
MCV RBC AUTO: 100 FL
MONOCYTES # BLD AUTO: 0.4 K/UL
MONOCYTES NFR BLD: 5.9 %
NEUTROPHILS # BLD AUTO: 4.9 K/UL
NEUTROPHILS NFR BLD: 82.5 %
PLATELET # BLD AUTO: 221 K/UL
PMV BLD AUTO: 7.8 FL
POTASSIUM SERPL-SCNC: 4.8 MMOL/L
PROT SERPL-MCNC: 6.9 G/DL
RBC # BLD AUTO: 3.29 M/UL
SODIUM SERPL-SCNC: 143 MMOL/L
WBC # BLD AUTO: 6 K/UL

## 2017-03-02 PROCEDURE — 11042 DBRDMT SUBQ TIS 1ST 20SQCM/<: CPT | Mod: S$GLB,,, | Performed by: PODIATRIST

## 2017-03-02 PROCEDURE — 80053 COMPREHEN METABOLIC PANEL: CPT

## 2017-03-02 PROCEDURE — 99499 UNLISTED E&M SERVICE: CPT | Mod: S$PBB,,, | Performed by: PODIATRIST

## 2017-03-02 PROCEDURE — 36415 COLL VENOUS BLD VENIPUNCTURE: CPT

## 2017-03-02 PROCEDURE — 99999 PR PBB SHADOW E&M-EST. PATIENT-LVL II: CPT | Mod: PBBFAC,,, | Performed by: PODIATRIST

## 2017-03-02 PROCEDURE — 99499 UNLISTED E&M SERVICE: CPT | Mod: S$GLB,,, | Performed by: PODIATRIST

## 2017-03-02 PROCEDURE — 85025 COMPLETE CBC W/AUTO DIFF WBC: CPT

## 2017-03-02 RX ORDER — ALPRAZOLAM 1 MG/1
1 TABLET ORAL NIGHTLY
Qty: 30 TABLET | Refills: 0 | OUTPATIENT
Start: 2017-03-02 | End: 2017-04-01

## 2017-03-02 RX ORDER — HYDROCODONE BITARTRATE AND ACETAMINOPHEN 7.5; 325 MG/1; MG/1
1 TABLET ORAL EVERY 6 HOURS PRN
Qty: 90 TABLET | Refills: 0 | OUTPATIENT
Start: 2017-03-02

## 2017-03-02 NOTE — PROGRESS NOTES
Subjective:      Patient ID: Richard Bustos is a 71 y.o. male.    Chief Complaint: Foot Ulcer (left)    Wound 3rd toe left.  Improving since doxycycline and ID appointment.  Changing dressing daily with nicole, wound foam, kerlix.  Ambulating minimally in sx shoe left, DM shoe/insert right.  Notes improvement in appearance.      Review of Systems   Constitution: Negative for chills, diaphoresis, fever, malaise/fatigue and night sweats.   Cardiovascular: Negative for claudication, cyanosis, leg swelling and syncope.   Skin: Positive for poor wound healing. Negative for color change, dry skin, nail changes, rash, suspicious lesions and unusual hair distribution.   Musculoskeletal: Negative for falls, joint pain, joint swelling, muscle cramps, muscle weakness and stiffness.   Gastrointestinal: Negative for constipation, diarrhea, nausea and vomiting.   Neurological: Negative for brief paralysis, disturbances in coordination, focal weakness, numbness, paresthesias, sensory change and tremors.           Objective:      Physical Exam   Constitutional: He appears well-developed and well-nourished. He is cooperative. No distress.   Cardiovascular:   Pulses:       Popliteal pulses are 2+ on the right side, and 2+ on the left side.        Dorsalis pedis pulses are 1+ on the right side, and 1+ on the left side.        Posterior tibial pulses are 1+ on the right side, and 1+ on the left side.   Capillary refill 3 seconds all toes/distal feet, all toes/both feet warm to touch.      Negative lymphadenopathy bilateral popliteal fossa and tarsal tunnel.     2+ pitting left lower extremity edema foot and ankle only.     Musculoskeletal:   Normal angle, base, station of gait. All ten toes without clubbing, cyanosis, or signs of ischemia.  No pain to palpation bilateral lower extremities.  Range of motion, stability, muscle strength, and muscle tone normal bilateral feet and legs.     Lymphadenopathy: No inguinal adenopathy noted on  the right or left side.   Negative lymphadenopathy bilateral popliteal fossa and tarsal tunnel.   Neurological: He is alert. He has normal strength. He displays no atrophy and no tremor. A sensory deficit is present. He exhibits normal muscle tone. He displays no seizure activity. Gait normal.   Reflex Scores:       Patellar reflexes are 2+ on the right side and 2+ on the left side.       Achilles reflexes are 2+ on the right side and 2+ on the left side.  Diminished/loss of protective sensation all toes bilateral to 10 gram monofilament.     Skin: Skin is warm, dry and intact. No abrasion, no bruising, no burn, no ecchymosis, no laceration, no lesion and no rash noted. He is not diaphoretic. No cyanosis or erythema. No pallor. Nails show no clubbing.     Wound: tip toe left 3rd    Size:    Pre:2x2x1 mm  Post: [id number]    Base:  Granular, pink with moderate serous/serosanaguinous drainage only.   without   pus,  fluctuance, malodor.  No malodor.  No exposed bone.    Borders:   flat, pink, blanchable skin edges without undermining.    Otherwise, Skin thin, shiny, atrophic, with decreased density and distribution of pedal hair bilateral, but without hyperpigmentation, minerva discoloration,  ulcers, masses, nodules or cords palpated bilateral feet and legs.               Assessment:       Encounter Diagnoses   Name Primary?    Ulcer of toe, left, with unspecified severity Yes    Osteomyelitis of ankle or foot     Diabetic polyneuropathy associated with type 2 diabetes mellitus     Peripheral vascular disease          Plan:       Richard was seen today for foot ulcer.    Diagnoses and all orders for this visit:    Ulcer of toe, left, with unspecified severity    Osteomyelitis of ankle or foot    Diabetic polyneuropathy associated with type 2 diabetes mellitus    Peripheral vascular disease      I counseled the patient on his conditions, their implications and medical management.        Continue clindamycin.  Add cipro  for pseudemonas coverage pending approval/input from coumadin clinic.    PREOPERATIVE DIAGNOSIS:  Ulcer left foot.      POSTOPERATIVE DIAGNOSIS:  Same    PROCEDURE:  Excisional debridement of ulcer      SURGEON:  Iván Torrez M.D.      No surgical assist.      ESTIMATED BLOOD LOSS:  Minimal, being less than 1 mL.      HEMOSTASIS:  Anatomic dissection and direct pressure manually.      ANESTHESIA:  Not necessary due to the patient's neuropathic status.      PROCEDURE IN DETAIL:  I used a sterile #15 blade to remove all the nonviable    wound elements from the wound periphery and base.  I redefined  the    wound borders in the process.  Debridement was carried into and did include the    subcutaneous layer.  All the nonviable wound elements were discarded and    red-bagged according to the policy.  Capillary ooze at the wound periphery    and base were easily controlled with direct pressure manually and the wound    today was dressed with Other: nicole, 4x4, kerlix - change same- every other day.  Pre and    postoperative wound dimensions are contained in the attached progress note and    the patient was discharged today under self care and will follow up here in    one week, sooner p.r.n.    Keep ID follow up and continue treatment to completion.      Continue minimal ambulation in sx shoe left, DM shoe, insert right.            Return in about 1 week (around 3/9/2017) for wound care.

## 2017-03-02 NOTE — PROGRESS NOTES
Pre-Visit Chart Review  For Appointment Scheduled on (date) 3/6/17    Health Maintenance Due   Topic Date Due    Eye Exam  09/04/1955    TETANUS VACCINE  09/04/1965

## 2017-03-02 NOTE — TELEPHONE ENCOUNTER
----- Message from Nadia Hui sent at 3/2/2017 11:54 AM CST -----  Contact: self [phone]  Patient is requesting a refill on xanex and hydrocodone to be sent to [facility]. Please call patient with questions or concerns at [phone].

## 2017-03-02 NOTE — MR AVS SNAPSHOT
[facility]  [address] [address]  Phone: [phone]                  Richard Bustos   3/2/2017 11:30 AM   Office Visit    Description:  Male : 1945   Provider:  Iván Torrez DPM   Department:  [facility]           Reason for Visit     Foot Ulcer           Diagnoses this Visit        Comments    Ulcer of toe, left, with unspecified severity    -  Primary     Osteomyelitis of ankle or foot         Diabetic polyneuropathy associated with type 2 diabetes mellitus         Peripheral vascular disease                To Do List           Future Appointments        Provider Department Dept Phone    3/2/2017 3:40 PM LAB[facility] [phone]    3/6/2017 10:00 AM Familia Ramirez Jr., MD [facility] [phone]    3/9/2017 11:15 AM Iván Torrez DPM [facility] [phone]    2017 9:20 AM Rosas Casey MD [facility] [phone]    2017 11:00 AM LAB, [facility] [address] [facility] [phone]      Goals (5 Years of Data)     None      Follow-Up and Disposition     Return in about 1 week (around 3/9/2017) for wound care.      [facility]     [facility] Care [facility] - 24/7 Assistance  Registered nurses in the [facility] provide clinical advisement, health education, appointment booking, and other advisory services.  Call for this free service at [phone].             Medications           Message regarding Medications     Verify the changes and/or additions to your medication regime listed below are the same as discussed with your clinician today.  If any of these changes or additions are incorrect, please notify your healthcare provider.             Verify that the below list of medications is an accurate representation of the medications you are currently taking.  If none reported, the list may be blank. If incorrect, please contact your  healthcare provider. Carry this list with you in case of emergency.           Current Medications     alendronate-vitamin D3 (FOSAMAX PLUS D) 70 mg- 2,800 unit per tablet Take 1 tablet by mouth every 7 days.    allopurinol (ZYLOPRIM) 100 MG tablet TAKE 1 TABLET(100 MG) BY MOUTH EVERY DAY    alprazolam (XANAX) 1 MG tablet Take 1 tablet (1 mg total) by mouth every evening.    atorvastatin (LIPITOR) 80 MG tablet TAKE 1 TABLET(80 MG) BY MOUTH EVERY DAY    calcitRIOL (ROCALTROL) 0.5 MCG Cap     carvedilol (COREG) 25 MG tablet Take 1 tablet (25 mg total) by mouth 2 (two) times daily with meals.    ciclopirox (PENLAC) 8 % Soln Apply topically nightly.    ciclopirox 0.77 % Gel Apply topically 2 (two) times daily.    clopidogrel (PLAVIX) 75 mg tablet     clotrimazole-betamethasone 1-0.05% (LOTRISONE) cream Apply topically 2 (two) times daily. To head of penis    diazepam (VALIUM) 5 MG tablet Take 5 mg by mouth as directed.    doxycycline (VIBRAMYCIN) 100 MG Cap Take 1 capsule (100 mg total) by mouth every 12 (twelve) hours.    ergocalciferol (ERGOCALCIFEROL) 50,000 unit Cap TK 1 C PO Q WEEK    ferrous sulfate 325 (65 FE) MG EC tablet Take 1 tablet (325 mg total) by mouth 2 (two) times daily.    FOLIC ACID/MULTIVIT-MIN/LUTEIN (CENTRUM SILVER ORAL) Take 1 tablet by mouth once daily.    furosemide (LASIX) 40 MG tablet TAKE 1 TABLET BY MOUTH DAILY AS NEEDED    hydrocodone-acetaminophen 7.5-325mg (NORCO) 7.5-325 mg per tablet Take 1 tablet by mouth every 6 (six) hours as needed for Pain.    L-METHYL-B6-B12 3-35-2 mg Tab TAKE 1 TABLET BY MOUTH TWICE DAILY    lisinopril (PRINIVIL,ZESTRIL) 20 MG tablet     lisinopril (PRINIVIL,ZESTRIL) 40 MG tablet Take 1 tablet (40 mg total) by mouth every evening.    magnesium oxide (MAG-OX) 400 mg tablet Take 1 tablet (400 mg total) by mouth once daily.    mirabegron (MYRBETRIQ) 50 mg Tb24 Take 1 tablet (50 mg total) by mouth once daily.    nitroGLYCERIN 0.4 MG/DOSE TL SPRY (NITROLINGUAL) 400  "mcg/spray spray PLACE 1 SPRAY UNDER THE TONGUE EVERY 5 MINUTES AS NEEDED FOR CHEST PAIN    omeprazole (PRILOSEC) 20 MG capsule Take 20 mg by mouth once daily.    pantoprazole (PROTONIX) 20 MG tablet Take 1 tablet (20 mg total) by mouth 2 (two) times daily before meals.    ranitidine (ZANTAC) 150 MG tablet TAKE 1 TABLET(150 MG) BY MOUTH TWICE DAILY    salicylic acid (SALACYN) 6 % Crea Apply 1 application topically 2 (two) times daily.    vit C-vit E-lutein-min-om-3 (OCUVITE) 690-15-4-150 mg-unit-mg-mg Cap Take 1 capsule by mouth once daily.    warfarin (COUMADIN) 5 MG tablet     warfarin (COUMADIN) 5 MG tablet TAKE 1 TABLET BY MOUTH EVERY DAY           Clinical Reference Information           Your Vitals Were     Height                   5' 9" (1.753 m)           Allergies as of 3/2/2017     Shellfish Containing Products      Immunizations Administered on Date of Encounter - 3/2/2017     None      Language Assistance Services     ATTENTION: Language assistance services are available, free of charge. Please call [phone].      ATENCIÓN: Si caroline zeng, tiene a cruz disposición servicios gratuitos de asistencia lingüística. Llame al [phone].     ASHLI Ý: N?u b?n nói Ti?ng Vi?t, có các d?ch v? h? tr? ngôn ng? mi?n phí dành cho b?n. G?i s? [phone].         [facility] complies with applicable Federal civil rights laws and does not discriminate on the basis of race, color, national origin, age, disability, or sex.        "

## 2017-03-06 ENCOUNTER — OFFICE VISIT (OUTPATIENT)
Dept: FAMILY MEDICINE | Facility: CLINIC | Age: 72
End: 2017-03-06
Payer: MEDICARE

## 2017-03-06 VITALS
RESPIRATION RATE: 16 BRPM | WEIGHT: 301.38 LBS | HEIGHT: 70 IN | HEART RATE: 64 BPM | TEMPERATURE: 98 F | SYSTOLIC BLOOD PRESSURE: 107 MMHG | DIASTOLIC BLOOD PRESSURE: 63 MMHG | BODY MASS INDEX: 43.14 KG/M2

## 2017-03-06 DIAGNOSIS — N18.30 CKD (CHRONIC KIDNEY DISEASE) STAGE 3, GFR 30-59 ML/MIN: ICD-10-CM

## 2017-03-06 DIAGNOSIS — N18.30 TYPE 2 DIABETES MELLITUS WITH STAGE 3 CHRONIC KIDNEY DISEASE, WITH LONG-TERM CURRENT USE OF INSULIN: Primary | Chronic | ICD-10-CM

## 2017-03-06 DIAGNOSIS — E66.01 MORBID OBESITY DUE TO EXCESS CALORIES: Chronic | ICD-10-CM

## 2017-03-06 DIAGNOSIS — D68.59 HYPERCOAGULABLE STATE: ICD-10-CM

## 2017-03-06 DIAGNOSIS — Z15.89 MTHFR MUTATION (METHYLENETETRAHYDROFOLATE REDUCTASE): ICD-10-CM

## 2017-03-06 DIAGNOSIS — M54.5 CHRONIC LOW BACK PAIN, UNSPECIFIED BACK PAIN LATERALITY, WITH SCIATICA PRESENCE UNSPECIFIED: ICD-10-CM

## 2017-03-06 DIAGNOSIS — E11.22 TYPE 2 DIABETES MELLITUS WITH STAGE 3 CHRONIC KIDNEY DISEASE, WITH LONG-TERM CURRENT USE OF INSULIN: Primary | Chronic | ICD-10-CM

## 2017-03-06 DIAGNOSIS — G89.29 CHRONIC LOW BACK PAIN, UNSPECIFIED BACK PAIN LATERALITY, WITH SCIATICA PRESENCE UNSPECIFIED: ICD-10-CM

## 2017-03-06 DIAGNOSIS — E78.2 MIXED HYPERLIPIDEMIA: ICD-10-CM

## 2017-03-06 DIAGNOSIS — Z79.4 TYPE 2 DIABETES MELLITUS WITH STAGE 3 CHRONIC KIDNEY DISEASE, WITH LONG-TERM CURRENT USE OF INSULIN: Primary | Chronic | ICD-10-CM

## 2017-03-06 DIAGNOSIS — I10 ESSENTIAL HYPERTENSION: ICD-10-CM

## 2017-03-06 DIAGNOSIS — I25.10 CORONARY ARTERY DISEASE INVOLVING NATIVE CORONARY ARTERY OF NATIVE HEART WITHOUT ANGINA PECTORIS: Chronic | ICD-10-CM

## 2017-03-06 DIAGNOSIS — F41.9 ANXIETY: ICD-10-CM

## 2017-03-06 PROCEDURE — 1160F RVW MEDS BY RX/DR IN RCRD: CPT | Mod: S$GLB,,, | Performed by: FAMILY MEDICINE

## 2017-03-06 PROCEDURE — 3074F SYST BP LT 130 MM HG: CPT | Mod: S$GLB,,, | Performed by: FAMILY MEDICINE

## 2017-03-06 PROCEDURE — 99999 PR PBB SHADOW E&M-EST. PATIENT-LVL III: CPT | Mod: PBBFAC,,, | Performed by: FAMILY MEDICINE

## 2017-03-06 PROCEDURE — 1159F MED LIST DOCD IN RCRD: CPT | Mod: S$GLB,,, | Performed by: FAMILY MEDICINE

## 2017-03-06 PROCEDURE — 3078F DIAST BP <80 MM HG: CPT | Mod: S$GLB,,, | Performed by: FAMILY MEDICINE

## 2017-03-06 PROCEDURE — 3046F HEMOGLOBIN A1C LEVEL >9.0%: CPT | Mod: S$GLB,,, | Performed by: FAMILY MEDICINE

## 2017-03-06 PROCEDURE — 1125F AMNT PAIN NOTED PAIN PRSNT: CPT | Mod: S$GLB,,, | Performed by: FAMILY MEDICINE

## 2017-03-06 PROCEDURE — 99499 UNLISTED E&M SERVICE: CPT | Mod: S$PBB,,, | Performed by: FAMILY MEDICINE

## 2017-03-06 PROCEDURE — 99214 OFFICE O/P EST MOD 30 MIN: CPT | Mod: S$GLB,,, | Performed by: FAMILY MEDICINE

## 2017-03-06 PROCEDURE — 2022F DILAT RTA XM EVC RTNOPTHY: CPT | Mod: S$GLB,,, | Performed by: FAMILY MEDICINE

## 2017-03-06 PROCEDURE — 1157F ADVNC CARE PLAN IN RCRD: CPT | Mod: S$GLB,,, | Performed by: FAMILY MEDICINE

## 2017-03-06 PROCEDURE — 4010F ACE/ARB THERAPY RXD/TAKEN: CPT | Mod: S$GLB,,, | Performed by: FAMILY MEDICINE

## 2017-03-06 RX ORDER — ALPRAZOLAM 1 MG/1
1 TABLET ORAL NIGHTLY
Qty: 30 TABLET | Refills: 0 | Status: SHIPPED | OUTPATIENT
Start: 2017-03-06 | End: 2017-04-10 | Stop reason: SDUPTHER

## 2017-03-06 RX ORDER — HYDROCODONE BITARTRATE AND ACETAMINOPHEN 7.5; 325 MG/1; MG/1
1 TABLET ORAL EVERY 6 HOURS PRN
Qty: 90 TABLET | Refills: 0 | Status: SHIPPED | OUTPATIENT
Start: 2017-03-06 | End: 2017-04-10 | Stop reason: SDUPTHER

## 2017-03-06 NOTE — PATIENT INSTRUCTIONS
Weight Management: Getting Started  Healthy bodies come in all shapes and sizes. Not all bodies are made to be thin. For some people, a healthy weight is higher than the average weight listed on weight charts. Your healthcare provider can help you decide on a healthy weight for you.    Reasons to lose weight  Losing weight can help with some health problems, such as high blood pressure, heart disease, diabetes, sleep apnea, and arthritis. You may also feel more energy.  Set your long-term goal  Your goal doesn't even have to be a specific weight. You may decide on a fitness goal (such as being able to walk 10 miles a week), or a health goal (such as lowering your blood pressure). Choose a goal that is measurable and reasonable, so you know when you've reached it. A goal of reaching a BMI of less than 25 is not always reasonable (or possible).   Make an action plan  Habits dont change overnight. Setting your goals too high can leave you feeling discouraged if you cant reach them. Be realistic. Choose one or two small changes you can make now. Set an action plan for how you are going to make these changes. When you can stick to this plan, keep making a few more small changes. Taking small steps will help you stay on the path to success.  Track your progress  Write down your goals. Then, keep a daily record of your progress. Write down what you eat and how active you are. This record lets you look back on how much youve done. It may also help when youre feeling frustrated. Reward yourself for success. Even if you dont reach every goal, give yourself credit for what you do get done.  Get support  Encouragement from others can help make losing weight easier. Ask your family members and friends for support. They may even want to join you. Also look to your healthcare provider, registered dietitian, and  for help. Your local hospital can give you more information about nutrition, exercise, and  weight loss.  Date Last Reviewed: 1/31/2016 © 2000-2016 [employer]. [address] [place] [address], [place] [address]. All rights reserved. This information is not intended as a substitute for professional medical care. Always follow your healthcare professional's instructions.        Walking for Fitness  Fitness walking has something for everyone, even people who are already fit. Walking is one of the safest ways to condition your body aerobically. It can boost energy, help you lose weight, and reduce stress.    Physical benefits  · Walking strengthens your heart and lungs, and tones your muscles.  · When walking, your feet land with less impact than in other sports. This reduces chances of muscle, bone, and joint injury.  · Regular walking improves your cholesterol levels and lowers your risk of heart disease. And it helps you control your blood sugar if you have diabetes.  · Walking is a weight-bearing activity, which helps maintain bone density. This can help prevent osteoporosis.  Personal rewards  · Taking walks can help you relax and manage stress. And fitness walking may make you feel better about yourself.  · Walking can help you sleep better at night and make you less likely to be depressed.  · Regular walking may help maintain your memory as you get older.  · Walking is a great way to spend extra time with friends and family members. Be sure to invite your dog along!  Q&A about fitness walking  Q: Will walking keep me fit?  A: Yes. Regular walking at the right pace gives you all the benefits of other aerobic activities, such as jogging and swimming.  Q: Will walking help me lose weight and keep it off?  A: Yes. Per mile, walking can burn as many calories as jogging. Your health care provider can help work walking into your weight-loss plan.  Q: Is walking safe for my health?  A: Yes. Walking is safe if you have high blood pressure, diabetes, heart disease, or other conditions. Talk to your health  care provider before you start.  Date Last Reviewed: 5/9/2015  © 5091-3208 [employer], [employer]. [address] [place] [address], [place] [address]. All rights reserved. This information is not intended as a substitute for professional medical care. Always follow your healthcare professional's instructions.

## 2017-03-06 NOTE — MR AVS SNAPSHOT
[facility]  [address]  [place] [address]  Phone: [phone]  Fax: [phone]                  Richard Bustos   3/6/2017 10:00 AM   Office Visit    Description:  Male : 1945   Provider:  Familia Ramirez Jr., MD   Department:  [place] [facility]           Reason for Visit     Follow-up           Diagnoses this Visit        Comments    Anxiety         Chronic low back pain, unspecified back pain laterality, with sciatica presence unspecified                To Do List           Future Appointments        Provider Department Dept Phone    3/9/2017 11:15 AM Iván Torrez DPM [place] [facility] [phone]    2017 9:20 AM Rosas Casey MD [place] [facility] Pul[facility] [phone]    2017 10:00 AM RICK Lewis-ZHENG [facility] [phone]    2017 11:00 AM [place], [facility] [place] [address] [facility] [phone]    2017 9:00 AM Adeline Moss MD [place] [facility] Urology [phone]      Goals (5 Years of Data)     None      Follow-Up and Disposition     Return in about 6 months (around 2017).       These Medications        Disp Refills Start End    alprazolam (XANAX) 1 MG tablet 30 tablet 0 3/6/2017 2017    Take 1 tablet (1 mg total) by mouth every evening. - Oral    [facility] [address] [place] [address] [place]  [address] AT [facility] Ph #: [phone]       hydrocodone-acetaminophen 7.5-325mg (NORCO) 7.5-325 mg per tablet 90 tablet 0 3/6/2017     Take 1 tablet by mouth every 6 (six) hours as needed for Pain. - Oral    Pharmacy: [id number]  [place] - [address] AT [facility] Ph #: [phone]         [facility]     [facility] On [facility] Nurse Care Line -  Assistance  Registered nurses in the [facility] provide clinical advisement, health education, appointment booking, and other advisory  services.  Call for this free service at [phone].             Medications           Message regarding Medications     Verify the changes and/or additions to your medication regime listed below are the same as discussed with your clinician today.  If any of these changes or additions are incorrect, please notify your healthcare provider.        STOP taking these medications     diazepam (VALIUM) 5 MG tablet Take 5 mg by mouth as directed.           Verify that the below list of medications is an accurate representation of the medications you are currently taking.  If none reported, the list may be blank. If incorrect, please contact your healthcare provider. Carry this list with you in case of emergency.           Current Medications     alendronate-vitamin D3 (FOSAMAX PLUS D) 70 mg- 2,800 unit per tablet Take 1 tablet by mouth every 7 days.    allopurinol (ZYLOPRIM) 100 MG tablet TAKE 1 TABLET(100 MG) BY MOUTH EVERY DAY    alprazolam (XANAX) 1 MG tablet Take 1 tablet (1 mg total) by mouth every evening.    atorvastatin (LIPITOR) 80 MG tablet TAKE 1 TABLET(80 MG) BY MOUTH EVERY DAY    calcitRIOL (ROCALTROL) 0.5 MCG Cap     carvedilol (COREG) 25 MG tablet Take 1 tablet (25 mg total) by mouth 2 (two) times daily with meals.    ciclopirox (PENLAC) 8 % Soln Apply topically nightly.    ciclopirox 0.77 % Gel Apply topically 2 (two) times daily.    clopidogrel (PLAVIX) 75 mg tablet     clotrimazole-betamethasone 1-0.05% (LOTRISONE) cream Apply topically 2 (two) times daily. To head of penis    doxycycline (VIBRAMYCIN) 100 MG Cap Take 1 capsule (100 mg total) by mouth every 12 (twelve) hours.    ergocalciferol (ERGOCALCIFEROL) 50,000 unit Cap TK 1 C PO Q WEEK    ferrous sulfate 325 (65 FE) MG EC tablet Take 1 tablet (325 mg total) by mouth 2 (two) times daily.    FOLIC ACID/MULTIVIT-MIN/LUTEIN (CENTRUM SILVER ORAL) Take 1 tablet by mouth once daily.    furosemide (LASIX) 40 MG tablet TAKE 1 TABLET BY MOUTH DAILY AS  "NEEDED    hydrocodone-acetaminophen 7.5-325mg (NORCO) 7.5-325 mg per tablet Take 1 tablet by mouth every 6 (six) hours as needed for Pain.    L-METHYL-B6-B12 3-35-2 mg Tab TAKE 1 TABLET BY MOUTH TWICE DAILY    magnesium oxide (MAG-OX) 400 mg tablet Take 1 tablet (400 mg total) by mouth once daily.    mirabegron (MYRBETRIQ) 50 mg Tb24 Take 1 tablet (50 mg total) by mouth once daily.    nitroGLYCERIN 0.4 MG/DOSE TL SPRY (NITROLINGUAL) 400 mcg/spray spray PLACE 1 SPRAY UNDER THE TONGUE EVERY 5 MINUTES AS NEEDED FOR CHEST PAIN    omeprazole (PRILOSEC) 20 MG capsule Take 20 mg by mouth once daily.    pantoprazole (PROTONIX) 20 MG tablet Take 1 tablet (20 mg total) by mouth 2 (two) times daily before meals.    ranitidine (ZANTAC) 150 MG tablet TAKE 1 TABLET(150 MG) BY MOUTH TWICE DAILY    salicylic acid (SALACYN) 6 % Crea Apply 1 application topically 2 (two) times daily.    vit C-vit E-lutein-min-om-3 (OCUVITE) 257-50-7-150 mg-unit-mg-mg Cap Take 1 capsule by mouth once daily.    warfarin (COUMADIN) 5 MG tablet TAKE 1 TABLET BY MOUTH EVERY DAY    lisinopril (PRINIVIL,ZESTRIL) 20 MG tablet     lisinopril (PRINIVIL,ZESTRIL) 40 MG tablet Take 1 tablet (40 mg total) by mouth every evening.           Clinical Reference Information           Your Vitals Were     BP Pulse Temp Resp    107/63 (BP Location: Right arm, Patient Position: Sitting, BP Method: Automatic) 64 97.5 °F (36.4 °C) (Oral) 16    Height Weight BMI    5' 10" (1.778 m) 136.7 kg (301 lb 5.9 oz) 43.24 kg/m2      Blood Pressure          Most Recent Value    BP  107/63      Allergies as of 3/6/2017     Shellfish Containing Products      Immunizations Administered on Date of Encounter - 3/6/2017     None      Instructions      Weight Management: Getting Started  Healthy bodies come in all shapes and sizes. Not all bodies are made to be thin. For some people, a healthy weight is higher than the average weight listed on weight charts. Your healthcare provider can help " you decide on a healthy weight for you.    Reasons to lose weight  Losing weight can help with some health problems, such as high blood pressure, heart disease, diabetes, sleep apnea, and arthritis. You may also feel more energy.  Set your long-term goal  Your goal doesn't even have to be a specific weight. You may decide on a fitness goal (such as being able to walk 10 miles a week), or a health goal (such as lowering your blood pressure). Choose a goal that is measurable and reasonable, so you know when you've reached it. A goal of reaching a BMI of less than 25 is not always reasonable (or possible).   Make an action plan  Habits dont change overnight. Setting your goals too high can leave you feeling discouraged if you cant reach them. Be realistic. Choose one or two small changes you can make now. Set an action plan for how you are going to make these changes. When you can stick to this plan, keep making a few more small changes. Taking small steps will help you stay on the path to success.  Track your progress  Write down your goals. Then, keep a daily record of your progress. Write down what you eat and how active you are. This record lets you look back on how much youve done. It may also help when youre feeling frustrated. Reward yourself for success. Even if you dont reach every goal, give yourself credit for what you do get done.  Get support  Encouragement from others can help make losing weight easier. Ask your family members and friends for support. They may even want to join you. Also look to your healthcare provider, registered dietitian, and  for help. Your local hospital can give you more information about nutrition, exercise, and weight loss.  Date Last Reviewed: 1/31/2016 © 2000-2016 [employer]. [address] [place] [address], [place] [address]. All rights reserved. This information is not intended as a substitute for professional medical care. Always follow your  healthcare professional's instructions.        Walking for Fitness  Fitness walking has something for everyone, even people who are already fit. Walking is one of the safest ways to condition your body aerobically. It can boost energy, help you lose weight, and reduce stress.    Physical benefits  · Walking strengthens your heart and lungs, and tones your muscles.  · When walking, your feet land with less impact than in other sports. This reduces chances of muscle, bone, and joint injury.  · Regular walking improves your cholesterol levels and lowers your risk of heart disease. And it helps you control your blood sugar if you have diabetes.  · Walking is a weight-bearing activity, which helps maintain bone density. This can help prevent osteoporosis.  Personal rewards  · Taking walks can help you relax and manage stress. And fitness walking may make you feel better about yourself.  · Walking can help you sleep better at night and make you less likely to be depressed.  · Regular walking may help maintain your memory as you get older.  · Walking is a great way to spend extra time with friends and family members. Be sure to invite your dog along!  Q&A about fitness walking  Q: Will walking keep me fit?  A: Yes. Regular walking at the right pace gives you all the benefits of other aerobic activities, such as jogging and swimming.  Q: Will walking help me lose weight and keep it off?  A: Yes. Per mile, walking can burn as many calories as jogging. Your health care provider can help work walking into your weight-loss plan.  Q: Is walking safe for my health?  A: Yes. Walking is safe if you have high blood pressure, diabetes, heart disease, or other conditions. Talk to your health care provider before you start.  Date Last Reviewed: 5/9/2015  © 0737-7486 [employer]. [address] [place] [address], [place] [address]. All rights reserved. This information is not intended as a substitute for professional medical care.  Always follow your healthcare professional's instructions.             Language Assistance Services     ATTENTION: Language assistance services are available, free of charge. Please call [phone].      ATENCIÓN: Si habcoral zeng, tiene a cruz disposición servicios gratuitos de asistencia lingüística. Llame al [phone].     CHÚ Ý: N?u b?n nói Ti?ng Vi?t, có các d?ch v? h? tr? ngôn ng? mi?n phí dành cho b?n. G?i s? [phone].         [facility] complies with applicable Federal civil rights laws and does not discriminate on the basis of race, color, national origin, age, disability, or sex.

## 2017-03-08 LAB — INR PPP: 2.1

## 2017-03-09 ENCOUNTER — OFFICE VISIT (OUTPATIENT)
Dept: PODIATRY | Facility: CLINIC | Age: 72
End: 2017-03-09
Payer: MEDICARE

## 2017-03-09 VITALS — WEIGHT: 300.5 LBS | HEIGHT: 70 IN | BODY MASS INDEX: 43.02 KG/M2

## 2017-03-09 DIAGNOSIS — I73.9 PERIPHERAL VASCULAR DISEASE: ICD-10-CM

## 2017-03-09 DIAGNOSIS — L97.529 ULCER OF TOE, LEFT, WITH UNSPECIFIED SEVERITY: Primary | ICD-10-CM

## 2017-03-09 DIAGNOSIS — M86.9 OSTEOMYELITIS OF ANKLE OR FOOT: ICD-10-CM

## 2017-03-09 DIAGNOSIS — E11.42 DIABETIC POLYNEUROPATHY ASSOCIATED WITH TYPE 2 DIABETES MELLITUS: ICD-10-CM

## 2017-03-09 PROCEDURE — 99212 OFFICE O/P EST SF 10 MIN: CPT | Mod: S$GLB,,, | Performed by: PODIATRIST

## 2017-03-09 PROCEDURE — 2022F DILAT RTA XM EVC RTNOPTHY: CPT | Mod: S$GLB,,, | Performed by: PODIATRIST

## 2017-03-09 PROCEDURE — 99499 UNLISTED E&M SERVICE: CPT | Mod: S$PBB,,, | Performed by: PODIATRIST

## 2017-03-09 PROCEDURE — 99999 PR PBB SHADOW E&M-EST. PATIENT-LVL II: CPT | Mod: PBBFAC,,, | Performed by: PODIATRIST

## 2017-03-09 PROCEDURE — 1157F ADVNC CARE PLAN IN RCRD: CPT | Mod: S$GLB,,, | Performed by: PODIATRIST

## 2017-03-09 PROCEDURE — 3046F HEMOGLOBIN A1C LEVEL >9.0%: CPT | Mod: S$GLB,,, | Performed by: PODIATRIST

## 2017-03-09 PROCEDURE — 1126F AMNT PAIN NOTED NONE PRSNT: CPT | Mod: S$GLB,,, | Performed by: PODIATRIST

## 2017-03-09 PROCEDURE — 1160F RVW MEDS BY RX/DR IN RCRD: CPT | Mod: S$GLB,,, | Performed by: PODIATRIST

## 2017-03-09 PROCEDURE — 4010F ACE/ARB THERAPY RXD/TAKEN: CPT | Mod: S$GLB,,, | Performed by: PODIATRIST

## 2017-03-09 PROCEDURE — 1159F MED LIST DOCD IN RCRD: CPT | Mod: S$GLB,,, | Performed by: PODIATRIST

## 2017-03-09 NOTE — MR AVS SNAPSHOT
[place] [facility]  [address] [place] [address]  Phone: [phone]                  Richard Bustos   3/9/2017 11:15 AM   Office Visit    Description:  Male : 1945   Provider:  Iván Torrez DPM   Department:  [place] [facility]           Reason for Visit     Foot Ulcer           Diagnoses this Visit        Comments    Ulcer of toe, left, with unspecified severity    -  Primary     Osteomyelitis of ankle or foot         Diabetic polyneuropathy associated with type 2 diabetes mellitus         Peripheral vascular disease                To Do List           Future Appointments        Provider Department Dept Phone    3/9/2017 11:15 AM Iván Torrez DPM [place] [facility] [phone]    3/23/2017 11:00 AM Iván Torrez DPM [place] [facility] [phone]    2017 9:20 AM Rosas Casey MD [place] [facility] Pul[facility] [phone]    2017 10:00 AM CALIN Lewis [place] [facility] [phone]    2017 11:00 AM LAB, MOB [address] [facility] [facility] [phone]      Goals (5 Years of Data)     None      Follow-Up and Disposition     Return in about 2 weeks (around 3/23/2017) for closed wond left 3rd toe.      [facility]     [facility] Care [facility] - 24/7 Assistance  Registered nurses in the [facility] provide clinical advisement, health education, appointment booking, and other advisory services.  Call for this free service at [phone].             Medications           Message regarding Medications     Verify the changes and/or additions to your medication regime listed below are the same as discussed with your clinician today.  If any of these changes or additions are incorrect, please notify your healthcare provider.             Verify that the below list of medications is an accurate representation of the medications you are currently taking.  If none reported, the list may be blank. If incorrect, please contact your  healthcare provider. Carry this list with you in case of emergency.           Current Medications     alendronate-vitamin D3 (FOSAMAX PLUS D) 70 mg- 2,800 unit per tablet Take 1 tablet by mouth every 7 days.    allopurinol (ZYLOPRIM) 100 MG tablet TAKE 1 TABLET(100 MG) BY MOUTH EVERY DAY    alprazolam (XANAX) 1 MG tablet Take 1 tablet (1 mg total) by mouth every evening.    atorvastatin (LIPITOR) 80 MG tablet TAKE 1 TABLET(80 MG) BY MOUTH EVERY DAY    calcitRIOL (ROCALTROL) 0.5 MCG Cap     carvedilol (COREG) 25 MG tablet Take 1 tablet (25 mg total) by mouth 2 (two) times daily with meals.    ciclopirox (PENLAC) 8 % Soln Apply topically nightly.    ciclopirox 0.77 % Gel Apply topically 2 (two) times daily.    clopidogrel (PLAVIX) 75 mg tablet     clotrimazole-betamethasone 1-0.05% (LOTRISONE) cream Apply topically 2 (two) times daily. To head of penis    doxycycline (VIBRAMYCIN) 100 MG Cap Take 1 capsule (100 mg total) by mouth every 12 (twelve) hours.    ergocalciferol (ERGOCALCIFEROL) 50,000 unit Cap TK 1 C PO Q WEEK    ferrous sulfate 325 (65 FE) MG EC tablet Take 1 tablet (325 mg total) by mouth 2 (two) times daily.    FOLIC ACID/MULTIVIT-MIN/LUTEIN (CENTRUM SILVER ORAL) Take 1 tablet by mouth once daily.    furosemide (LASIX) 40 MG tablet TAKE 1 TABLET BY MOUTH DAILY AS NEEDED    hydrocodone-acetaminophen 7.5-325mg (NORCO) 7.5-325 mg per tablet Take 1 tablet by mouth every 6 (six) hours as needed for Pain.    L-METHYL-B6-B12 3-35-2 mg Tab TAKE 1 TABLET BY MOUTH TWICE DAILY    lisinopril (PRINIVIL,ZESTRIL) 20 MG tablet     lisinopril (PRINIVIL,ZESTRIL) 40 MG tablet Take 1 tablet (40 mg total) by mouth every evening.    magnesium oxide (MAG-OX) 400 mg tablet Take 1 tablet (400 mg total) by mouth once daily.    mirabegron (MYRBETRIQ) 50 mg Tb24 Take 1 tablet (50 mg total) by mouth once daily.    nitroGLYCERIN 0.4 MG/DOSE TL SPRY (NITROLINGUAL) 400 mcg/spray spray PLACE 1 SPRAY UNDER THE TONGUE EVERY 5 MINUTES AS  "NEEDED FOR CHEST PAIN    omeprazole (PRILOSEC) 20 MG capsule Take 20 mg by mouth once daily.    pantoprazole (PROTONIX) 20 MG tablet Take 1 tablet (20 mg total) by mouth 2 (two) times daily before meals.    ranitidine (ZANTAC) 150 MG tablet TAKE 1 TABLET(150 MG) BY MOUTH TWICE DAILY    salicylic acid (SALACYN) 6 % Crea Apply 1 application topically 2 (two) times daily.    vit C-vit E-lutein-min-om-3 (OCUVITE) 957-33-4-150 mg-unit-mg-mg Cap Take 1 capsule by mouth once daily.    warfarin (COUMADIN) 5 MG tablet TAKE 1 TABLET BY MOUTH EVERY DAY           Clinical Reference Information           Your Vitals Were     Height Weight BMI          5' 10" (1.778 m) 136.3 kg (300 lb 7.8 oz) 43.12 kg/m2        Allergies as of 3/9/2017     Shellfish Containing Products      Immunizations Administered on Date of Encounter - 3/9/2017     None      Language Assistance Services     ATTENTION: Language assistance services are available, free of charge. Please call [phone].      ATENCIÓN: Si caroline zeng, tiene a cruz disposición servicios gratuitos de asistencia lingüística. Llame al [phone].     ASHLI Ý: N?u b?n nói Ti?ng Vi?t, có các d?ch v? h? tr? ngôn ng? mi?n phí dành cho b?n. G?i s? [phone].         [facility] complies with applicable Federal civil rights laws and does not discriminate on the basis of race, color, national origin, age, disability, or sex.        "

## 2017-03-09 NOTE — PROGRESS NOTES
Subjective:      Patient ID: Richard Bustos is a 71 y.o. male.    Chief Complaint: Foot Ulcer (Left foot 3rd toe)    Wound 3rd toe left.  Improving well.  Changing dressing daily with nicole, wound foam, kerlix.  Ambulating minimally in sx shoe left, DM shoe/insert right.  Notes improvement in appearance.      Review of Systems   Constitution: Negative for chills, diaphoresis, fever, malaise/fatigue and night sweats.   Cardiovascular: Negative for claudication, cyanosis, leg swelling and syncope.   Skin: Positive for poor wound healing. Negative for color change, dry skin, nail changes, rash, suspicious lesions and unusual hair distribution.   Musculoskeletal: Negative for falls, joint pain, joint swelling, muscle cramps, muscle weakness and stiffness.   Gastrointestinal: Negative for constipation, diarrhea, nausea and vomiting.   Neurological: Negative for brief paralysis, disturbances in coordination, focal weakness, numbness, paresthesias, sensory change and tremors.           Objective:      Physical Exam   Constitutional: He appears well-developed and well-nourished. He is cooperative. No distress.   Cardiovascular:   Pulses:       Popliteal pulses are 2+ on the right side, and 2+ on the left side.        Dorsalis pedis pulses are 1+ on the right side, and 1+ on the left side.        Posterior tibial pulses are 1+ on the right side, and 1+ on the left side.   Capillary refill 3 seconds all toes/distal feet, all toes/both feet warm to touch.      Negative lymphadenopathy bilateral popliteal fossa and tarsal tunnel.     2+ pitting left lower extremity edema foot and ankle only.     Musculoskeletal:   Normal angle, base, station of gait. All ten toes without clubbing, cyanosis, or signs of ischemia.  No pain to palpation bilateral lower extremities.  Range of motion, stability, muscle strength, and muscle tone normal bilateral feet and legs.     Lymphadenopathy: No inguinal adenopathy noted on the right or left  side.   Negative lymphadenopathy bilateral popliteal fossa and tarsal tunnel.   Neurological: He is alert. He has normal strength. He displays no atrophy and no tremor. A sensory deficit is present. He exhibits normal muscle tone. He displays no seizure activity. Gait normal.   Reflex Scores:       Patellar reflexes are 2+ on the right side and 2+ on the left side.       Achilles reflexes are 2+ on the right side and 2+ on the left side.  Diminished/loss of protective sensation all toes bilateral to 10 gram monofilament.     Skin: Skin is warm, dry and intact. No abrasion, no bruising, no burn, no ecchymosis, no laceration, no lesion and no rash noted. He is not diaphoretic. No cyanosis or erythema. No pallor. Nails show no clubbing.     Wound tip 3rd toe left closed today, epithelialized  without ulceration, drainage, pus, tracking, fluctuance, malodor, or cardinal signs infection.    Otherwise, Skin thin, shiny, atrophic, with decreased density and distribution of pedal hair bilateral, but without hyperpigmentation, minerva discoloration,  ulcers, masses, nodules or cords palpated bilateral feet and legs.               Assessment:       Encounter Diagnoses   Name Primary?    Ulcer of toe, left, with unspecified severity Yes    Osteomyelitis of ankle or foot     Diabetic polyneuropathy associated with type 2 diabetes mellitus     Peripheral vascular disease          Plan:       Richard was seen today for foot ulcer.    Diagnoses and all orders for this visit:    Ulcer of toe, left, with unspecified severity    Osteomyelitis of ankle or foot    Diabetic polyneuropathy associated with type 2 diabetes mellitus    Peripheral vascular disease      I counseled the patient on his conditions, their implications and medical management.    Monitor both feet multiple times daily for signs of occurrence/recurrence ulceration.    Cushion/protect 3rd toe left with dry dressing changing every 2-3 days.    Keep ID follow up and  continue treatment to completion.    Continue minimal ambulation in sx shoe left, DM shoe, insert right.            Return in about 2 weeks (around 3/23/2017) for closed wond left 3rd toe.

## 2017-03-12 NOTE — PROGRESS NOTES
Subjective:       Patient ID: Richard Bustos is a 71 y.o. male.    Chief Complaint: Diabetes; Hypertension; Hyperlipidemia; Coronary Artery Disease; and Hypercoagulable state    HPI Comments: Patient presents here for three-month follow-up of multiple medical problems including diabetes, hypertension, hyperlipidemia, and multiple other problems.  He states his diabetes has been doing well and his sugars are under control at home.  He denies any hypoglycemia.  He does have known diabetic neuropathy and nephropathy; his last eye exam was one month ago and I have not received the findings yet.  As far as his hypertension this is well controlled on his present medications and he is tolerating his medications well.  He states he is trying to follow his low-sodium, low-fat low-cholesterol diet but has not been following it diligently.  His weight is increased 8 pounds in the last 3 months; he was again stressed the importance of following his diet and continuing to lose weight.  He has recently been seeing podiatry for an ulcer on the left third toe which is healing slowly.  He remains on his Coumadin for his hypercoagulable state and has had no bleeding problems recently.  He did have a significant hematoma last year in his right lower abdomen but no intervention was necessary.  As far as screening, he is up-to-date with all his routine screening; he has had his eye exam I just need the documentation.    Diabetes   He presents for his follow-up diabetic visit. He has type 2 diabetes mellitus. His disease course has been stable. Hypoglycemia symptoms include nervousness/anxiousness. Pertinent negatives for hypoglycemia include no dizziness or headaches. Associated symptoms include foot paresthesias. Pertinent negatives for diabetes include no chest pain, no fatigue, no polydipsia, no polyuria and no weight loss. Symptoms are stable. Diabetic complications include heart disease, impotence, nephropathy and peripheral  neuropathy. Pertinent negatives for diabetic complications include no CVA or retinopathy. Risk factors for coronary artery disease include diabetes mellitus, dyslipidemia, hypertension, male sex, obesity and sedentary lifestyle. Current diabetic treatment includes insulin injections. He is compliant with treatment some of the time. His weight is stable. He is following a diabetic, low fat/cholesterol and low salt diet. Meal planning includes avoidance of concentrated sweets. He has had a previous visit with a dietitian. He never participates in exercise. There is no change in his home blood glucose trend. An ACE inhibitor/angiotensin II receptor blocker is being taken. He sees a podiatrist.Eye exam is current.   Hypertension   This is a chronic problem. The problem is unchanged. The problem is controlled. Pertinent negatives include no chest pain, headaches, palpitations or shortness of breath. Risk factors for coronary artery disease include diabetes mellitus, dyslipidemia, male gender, obesity, post-menopausal state and sedentary lifestyle. Past treatments include beta blockers and ACE inhibitors. The current treatment provides moderate improvement. Hypertensive end-organ damage includes kidney disease and CAD/MI. There is no history of CVA, heart failure or retinopathy.   Hyperlipidemia   This is a chronic problem. The problem is controlled. Recent lipid tests were reviewed and are normal. Pertinent negatives include no chest pain, myalgias or shortness of breath. Current antihyperlipidemic treatment includes ezetimibe, fibric acid derivatives and herbal therapy. The current treatment provides moderate improvement of lipids. Compliance problems include adherence to exercise.    Coronary Artery Disease   Presents for follow-up visit. Symptoms include leg swelling. Pertinent negatives include no chest pain, dizziness, palpitations or shortness of breath. Risk factors include hyperlipidemia. The symptoms have been  stable. Compliance with diet is good. Compliance with exercise is variable. Compliance with medications is good.     Review of Systems   Constitutional: Positive for unexpected weight change. Negative for chills, fatigue, fever and weight loss.   HENT: Negative for congestion, ear pain, postnasal drip and sore throat.    Eyes: Negative for pain and visual disturbance.        UTD with eye exam   Respiratory: Negative for cough, shortness of breath and wheezing.    Cardiovascular: Positive for leg swelling. Negative for chest pain and palpitations.   Gastrointestinal: Negative for abdominal pain, diarrhea, nausea and vomiting.   Endocrine: Negative for polydipsia and polyuria.   Genitourinary: Positive for impotence. Negative for difficulty urinating, dysuria, flank pain and frequency.        No nocturia   Musculoskeletal: Positive for arthralgias and back pain. Negative for myalgias.   Neurological: Negative for dizziness, syncope, light-headedness and headaches.   Hematological: Negative for adenopathy. Bruises/bleeds easily (due to coumadin).   Psychiatric/Behavioral: Positive for sleep disturbance. Negative for agitation. The patient is nervous/anxious.        Objective:      Physical Exam   Constitutional: He is oriented to person, place, and time.   Morbidly obese male in no acute distress   HENT:   Head: Normocephalic and atraumatic.   Right Ear: External ear normal.   Left Ear: External ear normal.   Nose: Nose normal.   Mouth/Throat: Oropharynx is clear and moist.   Neck: Normal range of motion. Neck supple. No thyromegaly present.   Cardiovascular: Normal rate, regular rhythm, normal heart sounds and intact distal pulses.    No murmur heard.  Pulmonary/Chest: Effort normal and breath sounds normal. He has no wheezes. He has no rales.   Musculoskeletal: Normal range of motion. He exhibits edema.   Lymphadenopathy:     He has no cervical adenopathy.   Neurological: He is alert and oriented to person, place,  and time. No cranial nerve deficit.   Skin: Skin is warm and dry. No rash noted. No erythema.   Psychiatric: He has a normal mood and affect. His behavior is normal.   Vitals reviewed.      Assessment:       1. Type 2 diabetes mellitus with stage 3 chronic kidney disease, with long-term current use of insulin    2. Coronary artery disease involving native coronary artery of native heart without angina pectoris    3. Essential hypertension    4. Mixed hyperlipidemia    5. CKD (chronic kidney disease) stage 3, GFR 30-59 ml/min, 41    6. Anxiety    7. Chronic low back pain, unspecified back pain laterality, with sciatica presence unspecified    8. Morbid obesity due to excess calories    9. Hypercoagulable state, Prothrombin mutation    10. MTHFR mutation (methylenetetrahydrofolate reductase)        Plan:       1.  Patient is strongly encouraged to follow his diabetic, low-sodium, low-fat low-cholesterol diet and exercises much is possible for weight loss  2.  Continue present medications as his multiple medical problems noted above are stable  3.  Continue follow-up with podiatry for ulcer of the toe  4.  Continue to monitor glucoses and send me reports every 2-3 months  5.  Follow-up in 3 months or when necessary        Patient readiness: acceptance and barriers:none    During the course of the visit the patient was educated and counseled about the following:     Diabetes:  Addressed ADA diet.  Suggested low cholesterol diet.  Encouraged aerobic exercise.  Discussed foot care.  Reminded to get yearly retinal exam.  Discussed ways to avoid symptomatic hypoglycemia.  Discussed sick day management.  Reminded to bring in blood sugar diary at next visit.  Follow up in 3 months or as needed.  Hypertension:   Dietary sodium restriction.  Regular aerobic exercise.  Follow up: 3 months and as needed.  Obesity:   General weight loss/lifestyle modification strategies discussed (elicit support from others; identify saboteurs;  non-food rewards, etc).  Informal exercise measures discussed, e.g. taking stairs instead of elevator.    Goals: Diabetes: Maintain Hemoglobin A1C below 7, Hypertension: Reduce Blood Pressure and Obesity: Reduce calorie intake and BMI    Did patient meet goals/outcomes: Yes    The following self management tools provided: blood pressure log  blood glucose log    Patient Instructions (the written plan) was given to the patient/family.     Time spent with patient: 30 minutes

## 2017-03-14 ENCOUNTER — ANTI-COAG VISIT (OUTPATIENT)
Dept: CARDIOLOGY | Facility: CLINIC | Age: 72
End: 2017-03-14

## 2017-03-14 DIAGNOSIS — D68.59 HYPERCOAGULABLE STATE: ICD-10-CM

## 2017-03-14 DIAGNOSIS — Z79.01 LONG TERM (CURRENT) USE OF ANTICOAGULANTS: ICD-10-CM

## 2017-03-15 ENCOUNTER — LAB VISIT (OUTPATIENT)
Dept: LAB | Facility: HOSPITAL | Age: 72
End: 2017-03-15
Attending: INTERNAL MEDICINE
Payer: MEDICARE

## 2017-03-15 DIAGNOSIS — N18.30 CHRONIC KIDNEY DISEASE, STAGE III (MODERATE): Primary | ICD-10-CM

## 2017-03-15 LAB
ALBUMIN SERPL BCP-MCNC: 2.9 G/DL
ANION GAP SERPL CALC-SCNC: 8 MMOL/L
BACTERIA #/AREA URNS HPF: ABNORMAL /HPF
BILIRUB UR QL STRIP: NEGATIVE
BUN SERPL-MCNC: 32 MG/DL
CALCIUM SERPL-MCNC: 9.6 MG/DL
CHLORIDE SERPL-SCNC: 112 MMOL/L
CLARITY UR: CLEAR
CO2 SERPL-SCNC: 23 MMOL/L
COLOR UR: YELLOW
CREAT SERPL-MCNC: 1.7 MG/DL
CREAT UR-MCNC: 170.5 MG/DL
EST. GFR  (AFRICAN AMERICAN): 47 ML/MIN/1.73 M^2
EST. GFR  (NON AFRICAN AMERICAN): 40 ML/MIN/1.73 M^2
GLUCOSE SERPL-MCNC: 171 MG/DL
GLUCOSE UR QL STRIP: NEGATIVE
HGB UR QL STRIP: NEGATIVE
HYALINE CASTS #/AREA URNS LPF: 3 /LPF
KETONES UR QL STRIP: NEGATIVE
LEUKOCYTE ESTERASE UR QL STRIP: ABNORMAL
MICROSCOPIC COMMENT: ABNORMAL
NITRITE UR QL STRIP: POSITIVE
PH UR STRIP: 6 [PH] (ref 5–8)
PHOSPHATE SERPL-MCNC: 3.7 MG/DL
POTASSIUM SERPL-SCNC: 4.9 MMOL/L
PROT UR QL STRIP: ABNORMAL
PROT UR-MCNC: 86 MG/DL
PROT/CREAT RATIO, UR: 0.5
PTH-INTACT SERPL-MCNC: 145.2 PG/ML
RBC #/AREA URNS HPF: 1 /HPF (ref 0–4)
SODIUM SERPL-SCNC: 143 MMOL/L
SP GR UR STRIP: >=1.03 (ref 1–1.03)
SQUAMOUS #/AREA URNS HPF: 4 /HPF
URN SPEC COLLECT METH UR: ABNORMAL
UROBILINOGEN UR STRIP-ACNC: NEGATIVE EU/DL
WBC #/AREA URNS HPF: 3 /HPF (ref 0–5)

## 2017-03-15 PROCEDURE — 83970 ASSAY OF PARATHORMONE: CPT

## 2017-03-15 PROCEDURE — 81000 URINALYSIS NONAUTO W/SCOPE: CPT

## 2017-03-15 PROCEDURE — 82570 ASSAY OF URINE CREATININE: CPT

## 2017-03-15 PROCEDURE — 36415 COLL VENOUS BLD VENIPUNCTURE: CPT

## 2017-03-15 PROCEDURE — 80069 RENAL FUNCTION PANEL: CPT

## 2017-03-16 NOTE — PROGRESS NOTES
Spoke ZHENG gonzalez she confirms she has received message in regards to pts dosing and next inr check date

## 2017-03-23 ENCOUNTER — OFFICE VISIT (OUTPATIENT)
Dept: PODIATRY | Facility: CLINIC | Age: 72
End: 2017-03-23
Payer: MEDICARE

## 2017-03-23 VITALS — BODY MASS INDEX: 43.05 KG/M2 | WEIGHT: 300.69 LBS | HEIGHT: 70 IN

## 2017-03-23 DIAGNOSIS — I73.9 PERIPHERAL VASCULAR DISEASE: ICD-10-CM

## 2017-03-23 DIAGNOSIS — M86.9 OSTEOMYELITIS OF ANKLE OR FOOT: ICD-10-CM

## 2017-03-23 DIAGNOSIS — Z87.898 HISTORY OF ULCERATION: Primary | ICD-10-CM

## 2017-03-23 DIAGNOSIS — E11.42 DIABETIC POLYNEUROPATHY ASSOCIATED WITH TYPE 2 DIABETES MELLITUS: ICD-10-CM

## 2017-03-23 PROCEDURE — 99999 PR PBB SHADOW E&M-EST. PATIENT-LVL II: CPT | Mod: PBBFAC,,, | Performed by: PODIATRIST

## 2017-03-23 PROCEDURE — 1157F ADVNC CARE PLAN IN RCRD: CPT | Mod: S$GLB,,, | Performed by: PODIATRIST

## 2017-03-23 PROCEDURE — 99212 OFFICE O/P EST SF 10 MIN: CPT | Mod: S$GLB,,, | Performed by: PODIATRIST

## 2017-03-23 PROCEDURE — 1160F RVW MEDS BY RX/DR IN RCRD: CPT | Mod: S$GLB,,, | Performed by: PODIATRIST

## 2017-03-23 PROCEDURE — 3046F HEMOGLOBIN A1C LEVEL >9.0%: CPT | Mod: S$GLB,,, | Performed by: PODIATRIST

## 2017-03-23 PROCEDURE — 1159F MED LIST DOCD IN RCRD: CPT | Mod: S$GLB,,, | Performed by: PODIATRIST

## 2017-03-23 PROCEDURE — 1126F AMNT PAIN NOTED NONE PRSNT: CPT | Mod: S$GLB,,, | Performed by: PODIATRIST

## 2017-03-23 PROCEDURE — 4010F ACE/ARB THERAPY RXD/TAKEN: CPT | Mod: S$GLB,,, | Performed by: PODIATRIST

## 2017-03-23 PROCEDURE — 2022F DILAT RTA XM EVC RTNOPTHY: CPT | Mod: S$GLB,,, | Performed by: PODIATRIST

## 2017-03-23 PROCEDURE — 99499 UNLISTED E&M SERVICE: CPT | Mod: S$PBB,,, | Performed by: PODIATRIST

## 2017-03-23 NOTE — MR AVS SNAPSHOT
[facility]  [address] [place] [address]  Phone: [phone]                  Richard Bustos   3/23/2017 11:00 AM   Office Visit    Description:  Male : 1945   Provider:  Iván Torrez DPM   Department:  [facility]           Reason for Visit     Wound Check           Diagnoses this Visit        Comments    History of ulceration    -  Primary     Peripheral vascular disease         Diabetic polyneuropathy associated with type 2 diabetes mellitus         Osteomyelitis of ankle or foot                To Do List           Future Appointments        Provider Department Dept Phone    2017 9:20 AM Rosas Casey MD [facility] [phone]    2017 10:00 AM RICK Lewis-ZHENG [facility] [phone]    2017 11:00 AM LAB, M[facility] [address] [facility] [phone]    2017 9:00 AM Adeline Moss MD [facility] [phone]    10/5/2017 9:30 AM Familia Ramirez Jr., MD [facility] [phone]      Goals (5 Years of Data)     None      Follow-Up and Disposition     Return in about 1 month (around 2017) for AR care/procb.      [facility]     [facility] Care Line - 24/7 Assistance  Registered nurses in the [facility] provide clinical advisement, health education, appointment booking, and other advisory services.  Call for this free service at [phone].             Medications           Message regarding Medications     Verify the changes and/or additions to your medication regime listed below are the same as discussed with your clinician today.  If any of these changes or additions are incorrect, please notify your healthcare provider.             Verify that the below list of medications is an accurate representation of the medications you are currently taking.  If none reported, the list may be blank. If incorrect, please contact your healthcare  provider. Carry this list with you in case of emergency.           Current Medications     alendronate-vitamin D3 (FOSAMAX PLUS D) 70 mg- 2,800 unit per tablet Take 1 tablet by mouth every 7 days.    allopurinol (ZYLOPRIM) 100 MG tablet TAKE 1 TABLET(100 MG) BY MOUTH EVERY DAY    alprazolam (XANAX) 1 MG tablet Take 1 tablet (1 mg total) by mouth every evening.    atorvastatin (LIPITOR) 80 MG tablet TAKE 1 TABLET(80 MG) BY MOUTH EVERY DAY    calcitRIOL (ROCALTROL) 0.5 MCG Cap     carvedilol (COREG) 25 MG tablet Take 1 tablet (25 mg total) by mouth 2 (two) times daily with meals.    ciclopirox (PENLAC) 8 % Soln Apply topically nightly.    ciclopirox 0.77 % Gel Apply topically 2 (two) times daily.    clopidogrel (PLAVIX) 75 mg tablet     clotrimazole-betamethasone 1-0.05% (LOTRISONE) cream Apply topically 2 (two) times daily. To head of penis    doxycycline (VIBRAMYCIN) 100 MG Cap Take 1 capsule (100 mg total) by mouth every 12 (twelve) hours.    ergocalciferol (ERGOCALCIFEROL) 50,000 unit Cap TK 1 C PO Q WEEK    ferrous sulfate 325 (65 FE) MG EC tablet Take 1 tablet (325 mg total) by mouth 2 (two) times daily.    FOLIC ACID/MULTIVIT-MIN/LUTEIN (CENTRUM SILVER ORAL) Take 1 tablet by mouth once daily.    furosemide (LASIX) 40 MG tablet TAKE 1 TABLET BY MOUTH DAILY AS NEEDED    hydrocodone-acetaminophen 7.5-325mg (NORCO) 7.5-325 mg per tablet Take 1 tablet by mouth every 6 (six) hours as needed for Pain.    L-METHYL-B6-B12 3-35-2 mg Tab TAKE 1 TABLET BY MOUTH TWICE DAILY    lisinopril (PRINIVIL,ZESTRIL) 20 MG tablet     lisinopril (PRINIVIL,ZESTRIL) 40 MG tablet Take 1 tablet (40 mg total) by mouth every evening.    magnesium oxide (MAG-OX) 400 mg tablet Take 1 tablet (400 mg total) by mouth once daily.    mirabegron (MYRBETRIQ) 50 mg Tb24 Take 1 tablet (50 mg total) by mouth once daily.    nitroGLYCERIN 0.4 MG/DOSE TL SPRY (NITROLINGUAL) 400 mcg/spray spray PLACE 1 SPRAY UNDER THE TONGUE EVERY 5 MINUTES AS NEEDED FOR  "CHEST PAIN    omeprazole (PRILOSEC) 20 MG capsule Take 20 mg by mouth once daily.    pantoprazole (PROTONIX) 20 MG tablet Take 1 tablet (20 mg total) by mouth 2 (two) times daily before meals.    ranitidine (ZANTAC) 150 MG tablet TAKE 1 TABLET(150 MG) BY MOUTH TWICE DAILY    salicylic acid (SALACYN) 6 % Crea Apply 1 application topically 2 (two) times daily.    vit C-vit E-lutein-min-om-3 (OCUVITE) 374-44-7-150 mg-unit-mg-mg Cap Take 1 capsule by mouth once daily.    warfarin (COUMADIN) 5 MG tablet TAKE 1 TABLET BY MOUTH EVERY DAY           Clinical Reference Information           Your Vitals Were     Height Weight BMI          5' 10" (1.778 m) 136.4 kg (300 lb 11.3 oz) 43.15 kg/m2        Allergies as of 3/23/2017     Shellfish Containing Products      Immunizations Administered on Date of Encounter - 3/23/2017     None      Language Assistance Services     ATTENTION: Language assistance services are available, free of charge. Please call [phone].      ATENCIÓN: Si caroline zeng, tiene a cruz disposición servicios gratuitos de asistencia lingüística. Llame al [phone].     ASHLI Ý: N?u b?n nói Ti?ng Vi?t, có các d?ch v? h? tr? ngôn ng? mi?n phí dành cho b?n. G?i s? [phone].         [facility] complies with applicable Federal civil rights laws and does not discriminate on the basis of race, color, national origin, age, disability, or sex.        "

## 2017-03-23 NOTE — PROGRESS NOTES
Subjective:      Patient ID: Richard Bustos is a 71 y.o. male.    Chief Complaint: Wound Check (Left 3rd toe)    Wound 3rd toe left closed.  No dressing presently.  Wearing DM shoes/inserts both feet.  Finishing antibiotics for osteomyelitis.      Review of Systems   Constitution: Negative for chills, diaphoresis, fever, malaise/fatigue and night sweats.   Cardiovascular: Negative for claudication, cyanosis, leg swelling and syncope.   Skin: Positive for poor wound healing. Negative for color change, dry skin, nail changes, rash, suspicious lesions and unusual hair distribution.   Musculoskeletal: Negative for falls, joint pain, joint swelling, muscle cramps, muscle weakness and stiffness.   Gastrointestinal: Negative for constipation, diarrhea, nausea and vomiting.   Neurological: Negative for brief paralysis, disturbances in coordination, focal weakness, numbness, paresthesias, sensory change and tremors.           Objective:      Physical Exam   Constitutional: He appears well-developed and well-nourished. He is cooperative. No distress.   Cardiovascular:   Pulses:       Popliteal pulses are 2+ on the right side, and 2+ on the left side.        Dorsalis pedis pulses are 1+ on the right side, and 1+ on the left side.        Posterior tibial pulses are 1+ on the right side, and 1+ on the left side.   Capillary refill 3 seconds all toes/distal feet, all toes/both feet warm to touch.      Negative lymphadenopathy bilateral popliteal fossa and tarsal tunnel.     2+ pitting left lower extremity edema foot and ankle only.     Musculoskeletal:   Normal angle, base, station of gait. All ten toes without clubbing, cyanosis, or signs of ischemia.  No pain to palpation bilateral lower extremities.  Range of motion, stability, muscle strength, and muscle tone normal bilateral feet and legs.     Lymphadenopathy: No inguinal adenopathy noted on the right or left side.   Negative lymphadenopathy bilateral popliteal fossa and  tarsal tunnel.   Neurological: He is alert. He has normal strength. He displays no atrophy and no tremor. A sensory deficit is present. He exhibits normal muscle tone. He displays no seizure activity. Gait normal.   Reflex Scores:       Patellar reflexes are 2+ on the right side and 2+ on the left side.       Achilles reflexes are 2+ on the right side and 2+ on the left side.  Diminished/loss of protective sensation all toes bilateral to 10 gram monofilament.     Skin: Skin is warm, dry and intact. No abrasion, no bruising, no burn, no ecchymosis, no laceration, no lesion and no rash noted. He is not diaphoretic. No cyanosis or erythema. No pallor. Nails show no clubbing.     Wound tip 3rd toe left remains closed today, epithelialized  without ulceration, drainage, pus, tracking, fluctuance, malodor, or cardinal signs infection.    Otherwise, Skin thin, shiny, atrophic, with decreased density and distribution of pedal hair bilateral, but without hyperpigmentation, minerva discoloration,  ulcers, masses, nodules or cords palpated bilateral feet and legs.               Assessment:       Encounter Diagnoses   Name Primary?    History of ulceration Yes    Peripheral vascular disease     Diabetic polyneuropathy associated with type 2 diabetes mellitus     Osteomyelitis of ankle or foot          Plan:       Richard was seen today for wound check.    Diagnoses and all orders for this visit:    History of ulceration    Peripheral vascular disease    Diabetic polyneuropathy associated with type 2 diabetes mellitus    Osteomyelitis of ankle or foot      I counseled the patient on his conditions, their implications and medical management.    Monitor both feet multiple times daily for signs of occurrence/recurrence ulceration.    Keep ID follow up and continue treatment to completion.    Continue minimal ambulation in  DM shoe, insert left and right.            Return in about 1 month (around 4/23/2017) for AR care/procb.

## 2017-03-27 ENCOUNTER — PATIENT MESSAGE (OUTPATIENT)
Dept: CARDIOLOGY | Facility: CLINIC | Age: 72
End: 2017-03-27

## 2017-03-29 ENCOUNTER — ANTI-COAG VISIT (OUTPATIENT)
Dept: CARDIOLOGY | Facility: CLINIC | Age: 72
End: 2017-03-29

## 2017-03-29 DIAGNOSIS — Z79.01 LONG TERM (CURRENT) USE OF ANTICOAGULANTS: Primary | ICD-10-CM

## 2017-03-29 DIAGNOSIS — D68.59 HYPERCOAGULABLE STATE: ICD-10-CM

## 2017-03-29 LAB — INR PPP: 1.9

## 2017-04-10 DIAGNOSIS — G89.29 CHRONIC LOW BACK PAIN, UNSPECIFIED BACK PAIN LATERALITY, WITH SCIATICA PRESENCE UNSPECIFIED: ICD-10-CM

## 2017-04-10 DIAGNOSIS — F41.9 ANXIETY: ICD-10-CM

## 2017-04-10 DIAGNOSIS — M54.5 CHRONIC LOW BACK PAIN, UNSPECIFIED BACK PAIN LATERALITY, WITH SCIATICA PRESENCE UNSPECIFIED: ICD-10-CM

## 2017-04-10 RX ORDER — HYDROCODONE BITARTRATE AND ACETAMINOPHEN 7.5; 325 MG/1; MG/1
1 TABLET ORAL EVERY 6 HOURS PRN
Qty: 90 TABLET | Refills: 0 | Status: SHIPPED | OUTPATIENT
Start: 2017-04-10 | End: 2017-05-08 | Stop reason: SDUPTHER

## 2017-04-10 RX ORDER — ALPRAZOLAM 1 MG/1
1 TABLET ORAL NIGHTLY
Qty: 30 TABLET | Refills: 0 | Status: SHIPPED | OUTPATIENT
Start: 2017-04-10 | End: 2017-05-08 | Stop reason: SDUPTHER

## 2017-04-10 NOTE — TELEPHONE ENCOUNTER
----- Message from Belkis Nuñez sent at 4/10/2017  9:55 AM CDT -----  Contact: Daughter - Citlali  Patient needs a script for his Xanax and Narco for the month.   Please call back at [phone].       [facility] [id number] [facility] [address] [place] [address]  Phone: [phone] Fax: [phone]

## 2017-04-20 ENCOUNTER — OFFICE VISIT (OUTPATIENT)
Dept: PULMONOLOGY | Facility: CLINIC | Age: 72
End: 2017-04-20
Payer: MEDICARE

## 2017-04-20 VITALS
OXYGEN SATURATION: 95 % | BODY MASS INDEX: 43.97 KG/M2 | SYSTOLIC BLOOD PRESSURE: 151 MMHG | WEIGHT: 307.13 LBS | HEIGHT: 70 IN | HEART RATE: 60 BPM | DIASTOLIC BLOOD PRESSURE: 76 MMHG

## 2017-04-20 DIAGNOSIS — G47.33 OBSTRUCTIVE SLEEP APNEA SYNDROME: Primary | Chronic | ICD-10-CM

## 2017-04-20 DIAGNOSIS — R09.89 CHRONIC SINUS COMPLAINTS: ICD-10-CM

## 2017-04-20 PROCEDURE — 1160F RVW MEDS BY RX/DR IN RCRD: CPT | Mod: S$GLB,,, | Performed by: INTERNAL MEDICINE

## 2017-04-20 PROCEDURE — 1125F AMNT PAIN NOTED PAIN PRSNT: CPT | Mod: S$GLB,,, | Performed by: INTERNAL MEDICINE

## 2017-04-20 PROCEDURE — 99203 OFFICE O/P NEW LOW 30 MIN: CPT | Mod: S$GLB,,, | Performed by: INTERNAL MEDICINE

## 2017-04-20 PROCEDURE — 1159F MED LIST DOCD IN RCRD: CPT | Mod: S$GLB,,, | Performed by: INTERNAL MEDICINE

## 2017-04-20 PROCEDURE — 3077F SYST BP >= 140 MM HG: CPT | Mod: S$GLB,,, | Performed by: INTERNAL MEDICINE

## 2017-04-20 PROCEDURE — 99499 UNLISTED E&M SERVICE: CPT | Mod: S$PBB,,, | Performed by: INTERNAL MEDICINE

## 2017-04-20 PROCEDURE — 99999 PR PBB SHADOW E&M-EST. PATIENT-LVL III: CPT | Mod: PBBFAC,,, | Performed by: INTERNAL MEDICINE

## 2017-04-20 PROCEDURE — 3078F DIAST BP <80 MM HG: CPT | Mod: S$GLB,,, | Performed by: INTERNAL MEDICINE

## 2017-04-20 PROCEDURE — 1157F ADVNC CARE PLAN IN RCRD: CPT | Mod: S$GLB,,, | Performed by: INTERNAL MEDICINE

## 2017-04-20 RX ORDER — FLUTICASONE PROPIONATE 50 MCG
2 SPRAY, SUSPENSION (ML) NASAL DAILY
Qty: 1 BOTTLE | Refills: 11 | Status: SHIPPED | OUTPATIENT
Start: 2017-04-20 | End: 2018-12-05 | Stop reason: SDUPTHER

## 2017-04-20 NOTE — LETTER
April 20, 2017      Familia Tate MD  [address]  [address]  [place] [address]           [facility]  [address]  [place] [address]  Phone: [phone]          Patient: Richard Bustos   MR Number: [id number]   YOB: 1945   Date of Visit: 4/20/2017       Dear Dr. Familia Tate:    Thank you for referring Richard Bustos to me for evaluation. Attached you will find relevant portions of my assessment and plan of care.    If you have questions, please do not hesitate to call me. I look forward to following Richard Bustos along with you.    Sincerely,    Rosas Casey MD    Enclosure  CC:  No Recipients    If you would like to receive this communication electronically, please contact [email or web address] or [phone] to request more information on [facility] access.    For providers and/or their staff who would like to refer a patient to [facility], please contact us through our one-stop-shop provider referral line, [facility] , at [phone].    If you feel you have received this communication in error or would no longer like to receive these types of communications, please e-mail [email or web address]

## 2017-04-20 NOTE — PROGRESS NOTES
"4/20/2017    Richard Bustos  New Patient Consult    Chief Complaint   Patient presents with    Apnea       HPI: , also ran [employer] [place], never smoker, lots arthritis- bilat hip replacements, gets around cane, had 2 stents and mi 2010 and cabg in 2008.  Dx osas yrs ago - sleep study ahi 41, currently says has bipap?  Uses machine 6-7 hr nightly - all night on machine.  epworth 5 today.    gained 15 lbs maybe in 4 yrs.  , sleep alone, no snoring suspected/reported.  The chief compliant  problem is new to me",   PFSH:  Past Medical History:   Diagnosis Date    Anemia     Anticoagulant long-term use     Aorta aneurysm     Arthritis     Atrial fibrillation     CAD (coronary artery disease)     CHF (congestive heart failure)     Chronic kidney disease     Colon polyp     Diabetes mellitus     Diabetes mellitus type II     Diverticulosis     Elevated PSA     Encounter for blood transfusion     Former smoker     GERD (gastroesophageal reflux disease)     Gout     Hx of colonic polyps     Hypercoagulable state     Hyperlipidemia     Hypertension     MI, old 2010    Myocardial infarction     9/2010    Prostate cancer 06/2016    Sleep apnea     Venous stasis     Chronic         Past Surgical History:   Procedure Laterality Date    ABDOMINAL SURGERY      CARDIAC SURGERY      COLONOSCOPY  02/2011    diverticulosis with diverticula with clot, no active bleeding    COLONOSCOPY N/A 8/24/2016    Procedure: COLONOSCOPY;  Surgeon: Saroj Borjas MD;  Location: [facility] END[facility];  Service: Endoscopy;  Laterality: N/A;    GASTRIC BYPASS  2/5/2008    IVC FILTER RETRIEVAL      JOINT REPLACEMENT  1996 and 2001    bi-lat hip replacement/Rt Hip and Lt Hip    ROTATOR CUFF REPAIR      Rt shoulder    Stents  8/18/2010    x 3    UPPER GASTROINTESTINAL ENDOSCOPY  02/2011     Social History   Substance Use Topics    Smoking status: Former Smoker    Smokeless tobacco: Never Used    " "  Comment: 45 yrs ago, only smoked 3-4 packs in his entire life as a teenager    Alcohol use Yes      Comment: rare     Family History   Problem Relation Age of Onset    Heart attack Mother     Heart attack Father     Heart attack Brother     Ulcerative colitis Daughter 35    Colon cancer Neg Hx     Colon polyps Neg Hx     Crohn's disease Neg Hx      Review of patient's allergies indicates:   Allergen Reactions    Shellfish containing products Other (See Comments)     Other reaction(s): Gout       Performance Status:The patient's activity level is mobility with asit devices.      Review of Systems:  a review of eleven systems covering constitutional, Eye, HEENT, Psych, Respiratory, Cardiac, GI, , Musculoskeletal, Endocrine, Dermatologic was negative except for pertinent findings as listed ABOVE and below:chr sinus drip, diabetes.     Exam:Comprehensive exam done. BP (!) 151/76 (BP Location: Right arm, Patient Position: Sitting, BP Method: Automatic)  Pulse 60  Ht 5' 10" (1.778 m)  Wt (!) 139.3 kg (307 lb 1.6 oz)  SpO2 95%  BMI 44.06 kg/m2  Exam included Vitals as listed, and patient's appearance and affect and alertness and mood, oral exam for yeast and hygiene and pharynx lesions and Mallapatti (M) score, neck with inspection for jvd and masses and thyroid abnormalities and lymph nodes (supraclavicular and infraclavicular nodes and axillary also examined and noted if abn), chest exam included symmetry and effort and fremitus and percussion and auscultation, cardiac exam included rhythm and gallops and murmur and rubs and jvd and edema, abdominal exam for mass and hepatosplenomegaly and tenderness and hernias and bowel sounds, Musculoskeletal exam with muscle tone and posture and mobility/gait and  strength, and skin for rashes and cyanosis and pallor and turgor, extremity for clubbing.  Findings were normal except for pertinent findings listed below:  M2, chr leg edema, morbid obese- mainly " below neck.    Radiographs (ct chest and cxr) reviewed: view by direct vision  June 2016 good    Labs reviewed      PFT was not done   DATE OF STUDY:  06/17/2013     Patient of Dr. Ramirez.     Nocturnal polysomnography is performed with split night protocol to evaluate   this 67-year-old patient's obstructive sleep apnea and treatment.  He has a 45   BMI.  The study began at 9:27 p.m. on 06/17/2013, ending at 4:32 a.m. on   06/18/2013.     The diagnostic portion of the study encompasses 145 minutes of sleep.  There is   a 83.9% sleep efficiency.  Stage I sleep represents 4.1% of TST, stage II sleep   95.9% of TST.  No slow wave or REM sleep was present.     There were 100 apneas and hypopneas with a 41.4 RDI.  His average oxygen   saturation is 93% awake with an average saturation during the diagnostic portion   at 86.4% with desaturation to 66%.     There are 85 PLMs with 35.2 PLMs an hour with a 7.4 PLM arousal index.  Average   heart rate is 49 with a sinus rhythm.     The treatment portion of the study encompasses 205 minutes of sleep.  Sleep   efficiency is 85.3%.  It took him 22 minutes to fall asleep.  Stage I sleep   represents 3.2% of TST, stage II sleep 60% of TST.  No slow wave sleep was   present, but REM sleep represents 36.8% of TST.     Monitoring of his respiratory events shows that we place him on nasal CPAP.  His   pressures are titrated upwards from 4 cm.  At 12 cm of pressure, supine REM   sleep was present with adequate oxygen saturation and a normal RDI.  He later   increased to 12 cm and then 16 cm.  Lateral REM is present at 14 and only a   short amount of sleep at 16 cm is present.     There are 31 PLMs with a 9.1 PLMI and a 2.6 PLM arousal index.     CONCLUSION:  Nocturnal polysomnography demonstrates:  1.  Obstructive sleep apnea to be present with a 41.4 RDI desaturating to 66%.    Sleep apnea is improved with the use of nasal CPAP and I will initiate CPAP   treatment to start at 13  cm.  2.  Periodic limb movements of sleep were present and improves with the use of   nasal CPAP.  3.  Sinus rhythm is noted with occasional premature ventricular contractions.        HES/SN  dd: 06/20/2013 20:36:50 (CDT)  td: 06/21/2013 00:55:49 (CDT)  Doc ID   #[id number]  Job ID #[id number]      epworth 5 today.    Plan:  Clinical impression is apparently straight forward and impression with management as below.    Richard was seen today for apnea.    Diagnoses and all orders for this visit:    Obstructive sleep apnea syndrome  -     CPAP/BIPAP SUPPLIES    Chronic sinus complaints  -     fluticasone (FLONASE) 50 mcg/actuation nasal spray; 2 sprays by Each Nare route once daily.      Return if symptoms worsen or fail to improve.    Discussed with patient above for education the following:      epworth 5 today.    41 times per hour breathing problems in 2013, 13 cm pressure corrected.      Nasal stuffiness more likely perhaps to interfere with treatment than weight?     Could use afrin bedtime for stuffy passages, use flonase for drip may help - 1-2 each side.    New mask ever 6 months ok.      Re study for sleepiness worse, or snoring.

## 2017-04-20 NOTE — PATIENT INSTRUCTIONS
41 times per hour breathing problems in 2013, 13 cm pressure corrected.      Nasal stuffiness more likely perhaps to interfere with treatment than weight?     Could use afrin bedtime for stuffy passages, use flonase for drip may help - 1-2 each side.    New mask ever 6 months ok.      Re study for sleepiness worse, or snoring.

## 2017-04-20 NOTE — MR AVS SNAPSHOT
[facility]  [address]  [place] [address]  Phone: [phone]                  Richard Bustos   2017 9:20 AM   Office Visit    Description:  Male : 1945   Provider:  Rosas Casey MD   Department:  [facility]           Reason for Visit     Apnea           Diagnoses this Visit        Comments    Obstructive sleep apnea syndrome    -  Primary     Chronic sinus complaints                To Do List           Future Appointments        Provider Department Dept Phone    2017 10:00 AM CALIN [facility] [place] [facility] [phone]    2017 11:00 AM LAB, MOB [address] [facility] [phone]    2017 9:00 AM [facility] [phone]    10/5/2017 9:30 AM Familia Ramirez Jr., MD [facility] [phone]      Goals (5 Years of Data)     None      Follow-Up and Disposition     Return if symptoms worsen or fail to improve.    Follow-up and Disposition History       These Medications        Disp Refills Start End    fluticasone (FLONASE) 50 mcg/actuation nasal spray 1 Bottle 11 2017     2 sprays by Each Nare route once daily. - Each Nare    Pharmacy: [facility] [id number] AT [facility] Ph #: [phone]         [facility]     [facility] Care [facility] - 24/ Assistance  Unless otherwise directed by your provider, please contact [facility], our nurse care line that is available for 24/7 assistance.     Registered nurses in the [facility] provide: appointment scheduling, clinical advisement, health education, and other advisory services.  Call: [phone] (toll free)               Medications           Message regarding Medications     Verify the changes and/or additions to your medication regime listed below are the same as discussed with your clinician today.   If any of these changes or additions are incorrect, please notify your healthcare provider.        START taking these NEW medications        Refills    fluticasone (FLONASE) 50 mcg/actuation nasal spray 11    Si sprays by Each Nare route once daily.    Class: Normal    Route: Each Nare           Verify that the below list of medications is an accurate representation of the medications you are currently taking.  If none reported, the list may be blank. If incorrect, please contact your healthcare provider. Carry this list with you in case of emergency.           Current Medications     alendronate-vitamin D3 (FOSAMAX PLUS D) 70 mg- 2,800 unit per tablet Take 1 tablet by mouth every 7 days.    allopurinol (ZYLOPRIM) 100 MG tablet TAKE 1 TABLET(100 MG) BY MOUTH EVERY DAY    alprazolam (XANAX) 1 MG tablet Take 1 tablet (1 mg total) by mouth every evening.    atorvastatin (LIPITOR) 80 MG tablet TAKE 1 TABLET(80 MG) BY MOUTH EVERY DAY    calcitRIOL (ROCALTROL) 0.5 MCG Cap     carvedilol (COREG) 25 MG tablet Take 1 tablet (25 mg total) by mouth 2 (two) times daily with meals.    ciclopirox (PENLAC) 8 % Soln Apply topically nightly.    ciclopirox 0.77 % Gel Apply topically 2 (two) times daily.    clopidogrel (PLAVIX) 75 mg tablet     clotrimazole-betamethasone 1-0.05% (LOTRISONE) cream Apply topically 2 (two) times daily. To head of penis    ergocalciferol (ERGOCALCIFEROL) 50,000 unit Cap TK 1 C PO Q WEEK    ferrous sulfate 325 (65 FE) MG EC tablet Take 1 tablet (325 mg total) by mouth 2 (two) times daily.    FOLIC ACID/MULTIVIT-MIN/LUTEIN (CENTRUM SILVER ORAL) Take 1 tablet by mouth once daily.    furosemide (LASIX) 40 MG tablet TAKE 1 TABLET BY MOUTH DAILY AS NEEDED    hydrocodone-acetaminophen 7.5-325mg (NORCO) 7.5-325 mg per tablet Take 1 tablet by mouth every 6 (six) hours as needed for Pain.    L-METHYL-B6-B12 3-35-2 mg Tab TAKE 1 TABLET BY MOUTH TWICE DAILY    lisinopril (PRINIVIL,ZESTRIL) 40 MG tablet Take 1  "tablet (40 mg total) by mouth every evening.    magnesium oxide (MAG-OX) 400 mg tablet Take 1 tablet (400 mg total) by mouth once daily.    mirabegron (MYRBETRIQ) 50 mg Tb24 Take 1 tablet (50 mg total) by mouth once daily.    omeprazole (PRILOSEC) 20 MG capsule Take 20 mg by mouth once daily.    pantoprazole (PROTONIX) 20 MG tablet Take 1 tablet (20 mg total) by mouth 2 (two) times daily before meals.    ranitidine (ZANTAC) 150 MG tablet TAKE 1 TABLET(150 MG) BY MOUTH TWICE DAILY    salicylic acid (SALACYN) 6 % Crea Apply 1 application topically 2 (two) times daily.    vit C-vit E-lutein-min-om-3 (OCUVITE) 732-85-4-150 mg-unit-mg-mg Cap Take 1 capsule by mouth once daily.    warfarin (COUMADIN) 5 MG tablet TAKE 1 TABLET BY MOUTH EVERY DAY    doxycycline (VIBRAMYCIN) 100 MG Cap Take 1 capsule (100 mg total) by mouth every 12 (twelve) hours.    fluticasone (FLONASE) 50 mcg/actuation nasal spray 2 sprays by Each Nare route once daily.    lisinopril (PRINIVIL,ZESTRIL) 20 MG tablet     nitroGLYCERIN 0.4 MG/DOSE TL SPRY (NITROLINGUAL) 400 mcg/spray spray PLACE 1 SPRAY UNDER THE TONGUE EVERY 5 MINUTES AS NEEDED FOR CHEST PAIN           Clinical Reference Information           Your Vitals Were     BP Pulse Height Weight SpO2 BMI    151/76 (BP Location: Right arm, Patient Position: Sitting, BP Method: Automatic) 60 5' 10" (1.778 m) 139.3 kg (307 lb 1.6 oz) 95% 44.06 kg/m2      Blood Pressure          Most Recent Value    BP  (!)  151/76      Allergies as of 4/20/2017     Shellfish Containing Products      Immunizations Administered on Date of Encounter - 4/20/2017     None      Orders Placed During Today's Visit      Normal Orders This Visit    CPAP/BIPAP SUPPLIES       Instructions    41 times per hour breathing problems in 2013, 13 cm pressure corrected.      Nasal stuffiness more likely perhaps to interfere with treatment than weight?     Could use afrin bedtime for stuffy passages, use flonase for drip may help - 1-2 " each side.    New mask ever 6 months ok.      Re study for sleepiness worse, or snoring.           Language Assistance Services     ATTENTION: Language assistance services are available, free of charge. Please call [phone].      ATENCIÓN: Si caroline zeng, tiene a cruz disposición servicios gratuitos de asistencia lingüística. Llame al [phone].     [facility] Ý: N?u b?n nói Ti?ng Vi?t, có các d?ch v? h? tr? ngôn ng? mi?n phí dành cho b?n. G?i s? [phone].         [facility] complies with applicable Federal civil rights laws and does not discriminate on the basis of race, color, national origin, age, disability, or sex.

## 2017-04-26 ENCOUNTER — OFFICE VISIT (OUTPATIENT)
Dept: PODIATRY | Facility: CLINIC | Age: 72
End: 2017-04-26
Payer: MEDICARE

## 2017-04-26 VITALS — BODY MASS INDEX: 43.91 KG/M2 | WEIGHT: 306.69 LBS | HEIGHT: 70 IN

## 2017-04-26 DIAGNOSIS — B35.1 ONYCHOMYCOSIS DUE TO DERMATOPHYTE: ICD-10-CM

## 2017-04-26 DIAGNOSIS — I73.9 PERIPHERAL VASCULAR DISEASE: ICD-10-CM

## 2017-04-26 DIAGNOSIS — E11.42 DIABETIC POLYNEUROPATHY ASSOCIATED WITH TYPE 2 DIABETES MELLITUS: Primary | ICD-10-CM

## 2017-04-26 PROCEDURE — 99499 UNLISTED E&M SERVICE: CPT | Mod: S$PBB,,, | Performed by: PODIATRIST

## 2017-04-26 PROCEDURE — 99499 UNLISTED E&M SERVICE: CPT | Mod: S$GLB,,, | Performed by: PODIATRIST

## 2017-04-26 PROCEDURE — 99999 PR PBB SHADOW E&M-EST. PATIENT-LVL II: CPT | Mod: PBBFAC,,, | Performed by: PODIATRIST

## 2017-04-26 PROCEDURE — 11721 DEBRIDE NAIL 6 OR MORE: CPT | Mod: Q9,S$GLB,, | Performed by: PODIATRIST

## 2017-04-26 NOTE — MR AVS SNAPSHOT
[facility]  [address]  [place] [address]  Phone: [phone]                  Richard Bustos   2017 3:00 PM   Office Visit    Description:  Male : 1945   Provider:  Iván Torrez DPM   Department:  [facility]           Reason for Visit     Foot Ulcer           Diagnoses this Visit        Comments    Diabetic polyneuropathy associated with type 2 diabetes mellitus    -  Primary     Peripheral vascular disease         Onychomycosis due to dermatophyte                To Do List           Future Appointments        Provider Department Dept Phone    2017 10:00 AM [facility] [phone]    2017 11:00 AM LAB, MOB [address] [facility] [phone]    2017 9:00 AM Adeline Moss MD [facility] [phone]    10/5/2017 9:30 AM Familia Ramirez Jr., MD [facility] [phone]      Goals (5 Years of Data)     None      Follow-Up and Disposition     Return in about 3 months (around 2017) for AR care.      [facility]     [facility] Care [facility] -  Assistance  Unless otherwise directed by your provider, please contact [facility], our nurse care line that is available for  assistance.     Registered nurses in the [facility] provide: appointment scheduling, clinical advisement, health education, and other advisory services.  Call: [phone] (toll free)               Medications           Message regarding Medications     Verify the changes and/or additions to your medication regime listed below are the same as discussed with your clinician today.  If any of these changes or additions are incorrect, please notify your healthcare provider.             Verify that the below list of medications is an accurate representation of the medications you are currently taking.  If none reported, the list may be blank. If incorrect, please  contact your healthcare provider. Carry this list with you in case of emergency.           Current Medications     alendronate-vitamin D3 (FOSAMAX PLUS D) 70 mg- 2,800 unit per tablet Take 1 tablet by mouth every 7 days.    allopurinol (ZYLOPRIM) 100 MG tablet TAKE 1 TABLET(100 MG) BY MOUTH EVERY DAY    alprazolam (XANAX) 1 MG tablet Take 1 tablet (1 mg total) by mouth every evening.    atorvastatin (LIPITOR) 80 MG tablet TAKE 1 TABLET(80 MG) BY MOUTH EVERY DAY    calcitRIOL (ROCALTROL) 0.5 MCG Cap     carvedilol (COREG) 25 MG tablet Take 1 tablet (25 mg total) by mouth 2 (two) times daily with meals.    ciclopirox (PENLAC) 8 % Soln Apply topically nightly.    ciclopirox 0.77 % Gel Apply topically 2 (two) times daily.    clopidogrel (PLAVIX) 75 mg tablet     clotrimazole-betamethasone 1-0.05% (LOTRISONE) cream Apply topically 2 (two) times daily. To head of penis    doxycycline (VIBRAMYCIN) 100 MG Cap Take 1 capsule (100 mg total) by mouth every 12 (twelve) hours.    ergocalciferol (ERGOCALCIFEROL) 50,000 unit Cap TK 1 C PO Q WEEK    ferrous sulfate 325 (65 FE) MG EC tablet Take 1 tablet (325 mg total) by mouth 2 (two) times daily.    fluticasone (FLONASE) 50 mcg/actuation nasal spray 2 sprays by Each Nare route once daily.    FOLIC ACID/MULTIVIT-MIN/LUTEIN (CENTRUM SILVER ORAL) Take 1 tablet by mouth once daily.    furosemide (LASIX) 40 MG tablet TAKE 1 TABLET BY MOUTH DAILY AS NEEDED    hydrocodone-acetaminophen 7.5-325mg (NORCO) 7.5-325 mg per tablet Take 1 tablet by mouth every 6 (six) hours as needed for Pain.    L-METHYL-B6-B12 3-35-2 mg Tab TAKE 1 TABLET BY MOUTH TWICE DAILY    lisinopril (PRINIVIL,ZESTRIL) 20 MG tablet     lisinopril (PRINIVIL,ZESTRIL) 40 MG tablet Take 1 tablet (40 mg total) by mouth every evening.    magnesium oxide (MAG-OX) 400 mg tablet Take 1 tablet (400 mg total) by mouth once daily.    mirabegron (MYRBETRIQ) 50 mg Tb24 Take 1 tablet (50 mg total) by mouth once daily.    nitroGLYCERIN  "0.4 MG/DOSE TL SPRY (NITROLINGUAL) 400 mcg/spray spray PLACE 1 SPRAY UNDER THE TONGUE EVERY 5 MINUTES AS NEEDED FOR CHEST PAIN    omeprazole (PRILOSEC) 20 MG capsule Take 20 mg by mouth once daily.    pantoprazole (PROTONIX) 20 MG tablet Take 1 tablet (20 mg total) by mouth 2 (two) times daily before meals.    ranitidine (ZANTAC) 150 MG tablet TAKE 1 TABLET(150 MG) BY MOUTH TWICE DAILY    salicylic acid (SALACYN) 6 % Crea Apply 1 application topically 2 (two) times daily.    vit C-vit E-lutein-min-om-3 (OCUVITE) 687-56-1-150 mg-unit-mg-mg Cap Take 1 capsule by mouth once daily.    warfarin (COUMADIN) 5 MG tablet TAKE 1 TABLET BY MOUTH EVERY DAY           Clinical Reference Information           Your Vitals Were     Height Weight BMI          5' 10" (1.778 m) 139.1 kg (306 lb 10.6 oz) 44 kg/m2        Allergies as of 4/26/2017     Shellfish Containing Products      Immunizations Administered on Date of Encounter - 4/26/2017     None      Language Assistance Services     ATTENTION: Language assistance services are available, free of charge. Please call [phone].      ATENCIÓN: Si habla karri, tiene a cruz disposición servicios gratuitos de asistencia lingüística. Llame al [phone].     CHÚ Ý: N?u b?n nói Ti?ng Vi?t, có các d?ch v? h? tr? ngôn ng? mi?n phí dành cho b?n. G?i s? [phone].         [facility] complies with applicable Federal civil rights laws and does not discriminate on the basis of race, color, national origin, age, disability, or sex.        "

## 2017-04-26 NOTE — PROGRESS NOTES
Subjective:      Patient ID: Richard Bustos is a 71 y.o. male.    Chief Complaint: Foot Ulcer (Left foot)    Richard is a 71 y.o. male who presents to the clinic for evaluation and treatment of high risk feet. Richard has a past medical history of Anemia; Anticoagulant long-term use; Aorta aneurysm; Arthritis; Atrial fibrillation; CAD (coronary artery disease); CHF (congestive heart failure); Chronic kidney disease; Colon polyp; Diabetes mellitus; Diabetes mellitus type II; Diverticulosis; Elevated PSA; Encounter for blood transfusion; Former smoker; GERD (gastroesophageal reflux disease); Gout; colonic polyps; Hypercoagulable state; Hyperlipidemia; Hypertension; MI, old (2010); Myocardial infarction; Prostate cancer (06/2016); Sleep apnea; and Venous stasis. The patient's chief complaint is long, thick toenails.  Gradual onset, worsening over past several weeks, aggravated by increased weight bearing, shoe gear, pressure.  Periodic debridement helps symptoms.   This patient has documented high risk feet requiring routine maintenance secondary to diabetes mellitis and those secondary complications of diabetes, as mentioned..    PCP: Familia Ramirez Jr, MD    Date Last Seen by PCP:   Chief Complaint   Patient presents with    Foot Ulcer     Left foot         Current shoe gear:  Affected Foot: Casual shoes     Unaffected Foot: Casual shoes    Hemoglobin A1C   Date Value Ref Range Status   12/15/2014 4.7 4.5 - 6.2 % Final   06/17/2013 6.8 (H) 4.8 - 5.6 % Final     Comment:              Increased risk for diabetes: 5.7 - 6.4           Diabetes: >6.4           Glycemic control for adults with diabetes: <7.0  **In order to standardize %HbA1c results worldwide, as of October 11, 2010,  the %HbA1c is being calculated using the master equation recommended in the  consensus statement adopted by the ADA (American Diabetes Assoc), EASD  (European Assoc for the Study of Diabetes), IFCC (International Federation  of Clinical Chemistry  and Laboratory Medicine) and IDF (International  Diabetes Federation). Result units: %HgbA1c (DCCT/NGSP).  In common with other methods, Hb A1C values may not accurately reflect mean  blood glucose in patients with hemoglobin variants (HgbF, HgbS and HgbC).  Any cause of shortened erythrocyte survival will reduce exposure of  erythrocytes to glucose with a consequent decrease in HbA1c (%) values, even  though the time-averaged blood glucose level may be elevated. Causes of  shortened erythrocyte lifetime might be hemolytic anemia or other hemolytic  diseases, homozygous sickle cell trait, pregnancy, recent significant or  chronic blood loss, etc. Caution should be used when interpreting the HbA1c  results from patients with these conditions.   07/05/2012 5.7 4.0 - 6.2 % Final   03/23/2011 text % Final     Comment:     Hemoglobin A1C:  Possible contamination of HgbA1C by a Hemoglobin variant. This  specimen was sent to [facility] for analysis by an  alternate method.       Review of Systems   Constitution: Negative for chills, fever, malaise/fatigue and night sweats.   Cardiovascular: Positive for leg swelling. Negative for claudication and cyanosis.   Skin: Positive for nail changes. Negative for color change, itching, poor wound healing, rash and suspicious lesions.   Musculoskeletal: Negative for falls, gout, joint pain and myalgias.   Neurological: Positive for paresthesias and sensory change.           Objective:      Physical Exam   Constitutional: He is oriented to person, place, and time. He appears well-developed and well-nourished. He is cooperative.   Oriented to time, place, and person.   Cardiovascular:   Pulses:       Dorsalis pedis pulses are 1+ on the right side, and 1+ on the left side.        Posterior tibial pulses are 1+ on the right side, and 1+ on the left side.   Capillary fill time 3-5 seconds.  All toes warm to touch.      2+ pitting lower extremity edema  bilateral.    Negative elevational pallor and dependent rubor bilateral.     Musculoskeletal:   Normal angle, base, station of gait. Decreased stride length, early heel off, moderately propulsive toe off bilateral.    All ten toes without clubbing, cyanosis, or signs of ischemia.      No pain to palpation bilateral lower extremities.      Range of motion, stability, muscle strength, and muscle tone are age and health appropriate normal bilateral feet and legs.       Lymphadenopathy:   Negative lymphadenopathy bilateral popliteal fossa and tarsal tunnel.     Neurological: He is alert and oriented to person, place, and time. He has normal strength. He is not disoriented. He displays no atrophy and no tremor. A sensory deficit is present. He exhibits normal muscle tone.   Reflex Scores:       Patellar reflexes are 2+ on the right side and 2+ on the left side.       Achilles reflexes are 2+ on the right side and 2+ on the left side.  Decreased/absent vibratory sensation bilateral feet to 128Hz tuning fork.    Paresthesias, and burning bilateral feet with no clearly identified trigger or source.     Skin: Skin is warm, dry and intact. No abrasion, no bruising, no burn, no ecchymosis, no laceration, no lesion, no petechiae and no rash noted. He is not diaphoretic. No cyanosis or erythema. No pallor. Nails show no clubbing.    Skin thin, atrophic, with decreased density and distribution of pedal hair bilateral, but without, minerva discoloration,  ulcers, masses, nodules or cords palpated bilateral feet and legs.    Hyperpigmentation both legs consistent with stasis.    Toenails 1st, 2nd, 3rd, 4th, 5th  bilateral are hypertrophic thickened 2-3 mm, dystrophic, discolored tanish brown with tan, gray crumbly subungual debris.  Tender to distal nail plate pressure, without periungual skin abnormality of each.               Assessment:       Encounter Diagnoses   Name Primary?    Diabetic polyneuropathy associated with type 2  diabetes mellitus Yes    Peripheral vascular disease     Onychomycosis due to dermatophyte          Plan:       Richard was seen today for foot ulcer.    Diagnoses and all orders for this visit:    Diabetic polyneuropathy associated with type 2 diabetes mellitus    Peripheral vascular disease    Onychomycosis due to dermatophyte      I counseled the patient on his conditions, their implications and medical management.        - Shoe inspection. Diabetic Foot Education. Patient reminded of the importance of good nutrition and blood sugar control to help prevent podiatric complications of diabetes. Patient instructed on proper foot hygeine. We discussed wearing proper shoe gear, daily foot inspections, never walking without protective shoe gear, never putting sharp instruments to feet, routine podiatric nail visits every 2-3 months.      - With patient's permission, nails were aggressively reduced and debrided x 10 to their soft tissue attachment mechanically, removing all offending nail and debris. Patient relates relief following the procedure. He will continue to monitor the areas daily, inspect his feet, wear protective shoe gear when ambulatory, moisturizer to maintain skin integrity and follow in this office p.r.n.    Wear compression stockings as directed by cardiology.    Ambulate to tolerance in shoes of choice.  Monitor both feet multiple times daily for signs of occurrence/recurrence ulceration.    Return in about 3 months (around 7/26/2017) for AR care.

## 2017-04-28 LAB — INR PPP: 1.7

## 2017-05-01 ENCOUNTER — DOCUMENTATION ONLY (OUTPATIENT)
Dept: FAMILY MEDICINE | Facility: CLINIC | Age: 72
End: 2017-05-01

## 2017-05-01 LAB
FASTING STATUS: NO
INR PPP: 1.7 (ref 0.8–1.2)
PROTHROMBIN TIME: 19.9 SECOND(S) (ref 12.3–14.7)

## 2017-05-01 NOTE — PROGRESS NOTES
Pre-Visit Chart Review  For Appointment Scheduled on 5/3/17    Health Maintenance Due   Topic Date Due    Eye Exam  09/04/1955

## 2017-05-01 NOTE — PATIENT INSTRUCTIONS
Controlling High Blood Pressure  High blood pressure (hypertension) is often called the silent killer. This is because many people who have it dont know it. High blood pressure is defined as 140/90 mm Hg or higher. Know your blood pressure and remember to check it regularly. Doing so can save your life. Here are some things you can do to help control your blood pressure.    Choose heart-healthy foods  · Select low-salt, low-fat foods. Limit sodium intake to 2,400 mg per day or the amount suggested by your healthcare provider.  · Limit canned, dried, cured, packaged, and fast foods. These can contain a lot of salt.  · Eat 8 to 10 servings of fruits and vegetables every day.  · Choose lean meats, fish, or chicken.  · Eat whole-grain pasta, brown rice, and beans.  · Eat 2 to 3 servings of low-fat or fat-free dairy products.  · Ask your doctor about the DASH eating plan. This plan helps reduce blood pressure.  · When you go to a restaurant, ask that your meal be prepared with no added salt.  Maintain a healthy weight  · Ask your healthcare provider how many calories to eat a day. Then stick to that number.  · Ask your healthcare provider what weight range is healthiest for you. If you are overweight, a weight loss of only 3% to 5% of your body weight can help lower blood pressure. Generally, a good weight loss goal is to lose 10% of your body weight in a year.  · Limit snacks and sweets.  · Get regular exercise.  Get up and get active  · Choose activities you enjoy. Find ones you can do with friends or family. This includes bicycling, dancing, walking, and jogging.  · Park farther away from building entrances.  · Use stairs instead of the elevator.  · When you can, walk or bike instead of driving.  · [place] leaves, garden, or do household repairs.  · Be active at a moderate to vigorous level of physical activity for at least 40 minutes for a minimum of 3 to 4 days a week.   Manage stress  · Make time to relax and enjoy  life. Find time to laugh.  · Communicate your concerns with your loved ones and your healthcare provider.  · Visit with family and friends, and keep up with hobbies.  Limit alcohol and quit smoking  · Men should have no more than 2 drinks per day.  · Women should have no more than 1 drink per day.  · Talk with your healthcare provider about quitting smoking. Smoking significantly increases your risk for heart disease and stroke. Ask your healthcare provider about community smoking cessation programs and other options.  Medicines  If lifestyle changes arent enough, your healthcare provider may prescribe high blood pressure medicine. Take all medicines as prescribed. If you have any questions about your medicines, ask your healthcare provider before stopping or changing them.   Date Last Reviewed: 4/27/2016 © 2000-2016 [employer]. [address] [place] [address], [place] [address]. All rights reserved. This information is not intended as a substitute for professional medical care. Always follow your healthcare professional's instructions.        Diabetes (General Information)  Diabetes is a long-term health problem. It means your body does not make enough insulin. Or it may mean that your body cannot use the insulin it makes. Insulin is a hormone in your body. It lets blood sugar (glucose) reach the cells in your body. All of your cells need glucose for fuel.  When you have diabetes, the glucose in your blood builds up because it cannot get into the cells. This buildup is called high blood sugar (hyperglycemia).  Your blood sugar level depends on several things. It depends on what kind of food you eat and how much of it you eat. It also depends on how much exercise you get, and how much insulin you have in your body. Eating too much of the wrong kinds of food or not taking diabetes medicine on time can cause high blood sugar. Infections can cause high blood sugar even if you are taking medicines  correctly.  These things can also cause low blood sugar:  · Missing meals  · Not eating enough food  · Taking too much diabetes medicine  Diabetes can cause serious problems over time if you do not get treated. These problems include heart disease, stroke, kidney failure, and blindness. They also include nerve pain or loss of feeling in your legs and feet, and gangrene of the feet. By keeping your blood sugar under control you can prevent or delay these problems.  Normal blood sugar levels are 80 to 100 before a meal and less than 180 in the 1 to 2 hours after a meal.  Home care  Follow these guidelines when caring for yourself at home:  · Follow the diet your healthcare provider gives you. Take insulin or other diabetes medicine exactly as told to.  · Watch your blood sugar as you are told to. Keep a log of your results. This will help your provider change your medicines to keep your blood sugar under control.  · Try to reach your ideal weight. You may be able to cut back on or not have to take diabetes medicine if you eat the right foods and get exercise.  · Do not smoke. Smoking worsens the effects of diabetes on your circulation. You are much more likely to have a heart attack if you have diabetes and you smoke.  · Take good care of your feet. If you have lost feeling in your feet, you may not see an injury or infection. Check your feet and between your toes at least once a week.  · Wear a medical alert bracelet or necklace, or carry a card in your wallet that says you have diabetes. This will help healthcare providers give you the right care if you get very ill and cannot tell them that you have diabetes.  Sick day plan  If you get a cold, the flu, or a bacterial or viral infection, take these steps:  · Look at your diabetes sick plan and call your healthcare provider as you were told to. You may need to call your provider right away if:  ¨ Your blood sugar is above 240 while taking your diabetes  medicine  ¨ Your urine ketone levels are above normal or high  ¨ You have been vomiting more than 6 hours  ¨ You have trouble breathing or your breath ha s a fruity smell  ¨ You have a high fever  ¨ You have a fever for several days and you are not getting better  ¨ You get light-headed and are sleepier than usual  · Keep taking your diabetes pills (oral medicine) even if you have been vomiting and are feeling sick. Call your provider right away because you may need insulin to lower your blood sugar until you recover from your illness.  · Keep taking your insulin even if you have been vomiting and are feeling sick. Call your provider right away to ask if you need to change your insulin dose. This will depend on your blood sugar results.  · Check your blood sugar every 2 to 4 hours, or at least 4 times a day.  · Check your ketones often. If you are vomiting and having diarrhea, watch them more often.  · Do not skip meals. Try to eat small meals on a regular schedule. Do this even if you do not feel like eating.  · Drink water or other liquids that do not have caffeine or calories. This will keep you from getting dehydrated. If you are nauseated or vomiting, takes small sips every 5 minutes. To prevent dehydration try to drink a cup (8 ounces) of fluids every hour while you are awake.  General care  Always bring a source of fast-acting sugar with you in case you have symptoms of low blood sugar (below 70). At the first sign of low blood sugar, eat or drink 15 to 20 grams of fast-acting sugar to raise your blood sugar. Examples are:  · 3 to 4 glucose tablets. You can buy these at most drugstores.  · 4 ounces (1/2 cup) of regular (not diet) soft drinks  · 4 ounces (1/2 cup) of any fruit juice  · 8 ounces (1 cup) of milk  · 5 to 6 pieces of hard candy  · 1 tablespoon of honey  Check your blood sugar 15 minutes after treating yourself. If it is still below 70, take 15 to 20 more grams of fast-acting sugar. Test again in  15 minutes. If it returns to normal (70 or above), eat a snack or meal to keep your blood sugar in a safe range. If it stays low, call your doctor or go to an emergency room.  Follow-up care  Follow-up with your healthcare provider, or as advised. For more information about diabetes, visit the American Diabetes Association website at www.diabetes.org or call 673-895-1913.  When to seek medical advice  Call your healthcare provider right away if you have any of these symptoms of high blood sugar:  · Frequent urination  · Dizziness  · Drowsiness  · Thirst  · Headache  · Nausea or vomiting  · Abdominal pain  · Eyesight changes  · Fast breathing  · Confusion or loss of consciousness  Also call your provider right away if you have any of these signs of low blood sugar:  · Fatigue  · Headache  · Shakes  · Excess sweating  · Hunger  · Feeling anxious or restless  · Eyesight changes  · Drowsiness  · Weakness  · Confusion or loss of consciousness  Call [phone]  Call for emergency help right away if any of these occur:  · Chest pain or shortness of breath  · Dizziness or fainting  · Weakness of an arm or leg or one side of the face  · Trouble speaking or seeing   Date Last Reviewed: 6/1/2016  © 7111-0916 [employer]. [address] [place] [address], [place] [address]. All rights reserved. This information is not intended as a substitute for professional medical care. Always follow your healthcare professional's instructions.        Weight Management: Getting Started  Healthy bodies come in all shapes and sizes. Not all bodies are made to be thin. For some people, a healthy weight is higher than the average weight listed on weight charts. Your healthcare provider can help you decide on a healthy weight for you.    Reasons to lose weight  Losing weight can help with some health problems, such as high blood pressure, heart disease, diabetes, sleep apnea, and arthritis. You may also feel more energy.  Set your long-term  goal  Your goal doesn't even have to be a specific weight. You may decide on a fitness goal (such as being able to walk 10 miles a week), or a health goal (such as lowering your blood pressure). Choose a goal that is measurable and reasonable, so you know when you've reached it. A goal of reaching a BMI of less than 25 is not always reasonable (or possible).   Make an action plan  Habits dont change overnight. Setting your goals too high can leave you feeling discouraged if you cant reach them. Be realistic. Choose one or two small changes you can make now. Set an action plan for how you are going to make these changes. When you can stick to this plan, keep making a few more small changes. Taking small steps will help you stay on the path to success.  Track your progress  Write down your goals. Then, keep a daily record of your progress. Write down what you eat and how active you are. This record lets you look back on how much youve done. It may also help when youre feeling frustrated. Reward yourself for success. Even if you dont reach every goal, give yourself credit for what you do get done.  Get support  Encouragement from others can help make losing weight easier. Ask your family members and friends for support. They may even want to join you. Also look to your healthcare provider, registered dietitian, and  for help. Your local hospital can give you more information about nutrition, exercise, and weight loss.  Date Last Reviewed: 1/31/2016  © 7555-0585 [employer], [employer]. [address] [place] [address], [place] [address]. All rights reserved. This information is not intended as a substitute for professional medical care. Always follow your healthcare professional's instructions.        Walking for Fitness  Fitness walking has something for everyone, even people who are already fit. Walking is one of the safest ways to condition your body aerobically. It can boost energy, help you lose  weight, and reduce stress.    Physical benefits  · Walking strengthens your heart and lungs, and tones your muscles.  · When walking, your feet land with less impact than in other sports. This reduces chances of muscle, bone, and joint injury.  · Regular walking improves your cholesterol levels and lowers your risk of heart disease. And it helps you control your blood sugar if you have diabetes.  · Walking is a weight-bearing activity, which helps maintain bone density. This can help prevent osteoporosis.  Personal rewards  · Taking walks can help you relax and manage stress. And fitness walking may make you feel better about yourself.  · Walking can help you sleep better at night and make you less likely to be depressed.  · Regular walking may help maintain your memory as you get older.  · Walking is a great way to spend extra time with friends and family members. Be sure to invite your dog along!  Q&A about fitness walking  Q: Will walking keep me fit?  A: Yes. Regular walking at the right pace gives you all the benefits of other aerobic activities, such as jogging and swimming.  Q: Will walking help me lose weight and keep it off?  A: Yes. Per mile, walking can burn as many calories as jogging. Your health care provider can help work walking into your weight-loss plan.  Q: Is walking safe for my health?  A: Yes. Walking is safe if you have high blood pressure, diabetes, heart disease, or other conditions. Talk to your health care provider before you start.  Date Last Reviewed: 5/9/2015  © 9899-4948 [employer]. [address] [place] [address], [place] [address]. All rights reserved. This information is not intended as a substitute for professional medical care. Always follow your healthcare professional's instructions.        Understanding Carbohydrates, Fats, and Protein  Food is a source of fuel and nourishment for your body. Its also a source of pleasure. Having diabetes doesnt mean you have to eat  special foods or give up desserts. Instead, your dietitian can show you how to plan meals to suit your body. To start, learn how different foods affect blood sugar.  Carbohydrates  Carbohydrates are the main source of fuel for the body. Carbohydrates raise blood sugar. Many people think carbohydrates are only found in pasta or bread. But carbohydrates are actually in many kinds of foods:  · Sugars occur naturally in foods such as fruit, milk, honey, and molasses. Sugars can also be added to many foods, from cereals and yogurt to candy and desserts. Sugars raise blood sugar.  · Starches are found in bread, cereals, pasta, and dried beans. Theyre also found in corn, peas, potatoes, yam, acorn squash, and butternut squash. Starches also raise blood sugar.   · Fiber is found in foods such as vegetables, fruits, beans, and whole grains. Unlike other carbs, fiber isnt digested or absorbed. So it doesnt raise blood sugar. In fact, fiber can help keep blood sugar from rising too fast. It also helps keep blood cholesterol at a healthy level.  Did you know?  Even though carbohydrates raise blood sugar, its best to have some in every meal. They are an important part of a healthy diet.   Fat  Fat is an energy source that can be stored until needed. Fat does not raise blood sugar. However, it can raise blood cholesterol, increasing the risk of heart disease. Fat is also high in calories, which can cause weight gain. Not all types of fat are the same.  More Healthy:  · Monounsaturated fats are mostly found in vegetable oils, such as olive, canola, and peanut oils. They are also found in avocados and some nuts. Monounsaturated fats are healthy for your heart. Thats because they lower LDL (unhealthy) cholesterol.  · Polyunsaturated fats are mostly found in vegetable oils, such as corn, safflower, and soybean oils. They are also found in some seeds, nuts, and fish. Polyunsaturated fats lower LDL (unhealthy) cholesterol. So,  choosing them instead of saturated fats is healthy for your heart. Certain unsaturated fats can help lower triglycerides.   Less Healthy:  · Saturated fats are found in animal products, such as meat, poultry, whole milk, lard, and butter. Saturated fats raise LDL cholesterol and are not healthy for your heart.  · Hydrogenated oils and trans fats are formed when vegetable oils are processed into solid fats. They are found in many processed foods. Hydrogenated oils and trans fats raise LDL cholesterol and lower HDL (healthy) cholesterol. They are not healthy for your heart.  Protein  Protein helps the body build and repair muscle and other tissue. Protein has little or no effect on blood sugar. However, many foods that contain protein also contain saturated fat. By choosing low-fat protein sources, you can get the benefits of protein without the extra fat:  · Plant protein is found in dry beans and peas, nuts, and soy products, such as tofu and soymilk. These sources tend to be cholesterol-free and low in saturated fat.  · Animal protein is found in fish, poultry, meat, cheese, milk, and eggs. These contain cholesterol and can be high in saturated fat. Aim for lean, lower-fat choices.  Date Last Reviewed: 3/1/2016  © 3800-1082 [employer]. [address] [place] [address], [place] [address]. All rights reserved. This information is not intended as a substitute for professional medical care. Always follow your healthcare professional's instructions.

## 2017-05-02 ENCOUNTER — ANTI-COAG VISIT (OUTPATIENT)
Dept: CARDIOLOGY | Facility: CLINIC | Age: 72
End: 2017-05-02

## 2017-05-02 DIAGNOSIS — Z79.01 LONG TERM (CURRENT) USE OF ANTICOAGULANTS: ICD-10-CM

## 2017-05-02 DIAGNOSIS — D68.59 HYPERCOAGULABLE STATE: ICD-10-CM

## 2017-05-03 ENCOUNTER — HOSPITAL ENCOUNTER (OUTPATIENT)
Dept: RADIOLOGY | Facility: CLINIC | Age: 72
Discharge: HOME | End: 2017-05-03
Attending: NURSE PRACTITIONER
Payer: MEDICARE

## 2017-05-03 ENCOUNTER — OFFICE VISIT (OUTPATIENT)
Dept: FAMILY MEDICINE | Facility: CLINIC | Age: 72
End: 2017-05-03
Payer: MEDICARE

## 2017-05-03 VITALS
HEIGHT: 70 IN | OXYGEN SATURATION: 95 % | HEART RATE: 59 BPM | TEMPERATURE: 98 F | SYSTOLIC BLOOD PRESSURE: 104 MMHG | BODY MASS INDEX: 43.81 KG/M2 | WEIGHT: 306 LBS | DIASTOLIC BLOOD PRESSURE: 63 MMHG

## 2017-05-03 DIAGNOSIS — T14.8XXA HEMATOMA: Primary | ICD-10-CM

## 2017-05-03 DIAGNOSIS — S30.1XXD ABDOMINAL WALL HEMATOMA, SUBSEQUENT ENCOUNTER: ICD-10-CM

## 2017-05-03 DIAGNOSIS — R42 DIZZINESS: ICD-10-CM

## 2017-05-03 DIAGNOSIS — I15.2 HYPERTENSION ASSOCIATED WITH DIABETES: ICD-10-CM

## 2017-05-03 DIAGNOSIS — E11.59 HYPERTENSION ASSOCIATED WITH DIABETES: ICD-10-CM

## 2017-05-03 DIAGNOSIS — E11.21 TYPE 2 DIABETES MELLITUS WITH DIABETIC NEPHROPATHY, WITHOUT LONG-TERM CURRENT USE OF INSULIN: ICD-10-CM

## 2017-05-03 DIAGNOSIS — T14.8XXA HEMATOMA: ICD-10-CM

## 2017-05-03 DIAGNOSIS — E66.01 MORBID OBESITY DUE TO EXCESS CALORIES: Chronic | ICD-10-CM

## 2017-05-03 PROCEDURE — 99999 PR PBB SHADOW E&M-EST. PATIENT-LVL V: CPT | Mod: 25,PBBFAC,, | Performed by: NURSE PRACTITIONER

## 2017-05-03 PROCEDURE — 3044F HG A1C LEVEL LT 7.0%: CPT | Mod: S$GLB,,, | Performed by: NURSE PRACTITIONER

## 2017-05-03 PROCEDURE — 76705 ECHO EXAM OF ABDOMEN: CPT | Mod: 26,,, | Performed by: RADIOLOGY

## 2017-05-03 PROCEDURE — 1157F ADVNC CARE PLAN IN RCRD: CPT | Mod: S$GLB,,, | Performed by: NURSE PRACTITIONER

## 2017-05-03 PROCEDURE — 99213 OFFICE O/P EST LOW 20 MIN: CPT | Mod: S$GLB,,, | Performed by: NURSE PRACTITIONER

## 2017-05-03 PROCEDURE — 76705 ECHO EXAM OF ABDOMEN: CPT | Mod: TC,PO

## 2017-05-03 PROCEDURE — 1159F MED LIST DOCD IN RCRD: CPT | Mod: S$GLB,,, | Performed by: NURSE PRACTITIONER

## 2017-05-03 PROCEDURE — 4010F ACE/ARB THERAPY RXD/TAKEN: CPT | Mod: S$GLB,,, | Performed by: NURSE PRACTITIONER

## 2017-05-03 PROCEDURE — 1125F AMNT PAIN NOTED PAIN PRSNT: CPT | Mod: S$GLB,,, | Performed by: NURSE PRACTITIONER

## 2017-05-03 PROCEDURE — 3078F DIAST BP <80 MM HG: CPT | Mod: S$GLB,,, | Performed by: NURSE PRACTITIONER

## 2017-05-03 PROCEDURE — 3074F SYST BP LT 130 MM HG: CPT | Mod: S$GLB,,, | Performed by: NURSE PRACTITIONER

## 2017-05-03 PROCEDURE — 1160F RVW MEDS BY RX/DR IN RCRD: CPT | Mod: S$GLB,,, | Performed by: NURSE PRACTITIONER

## 2017-05-03 PROCEDURE — 99499 UNLISTED E&M SERVICE: CPT | Mod: S$PBB,,, | Performed by: NURSE PRACTITIONER

## 2017-05-03 NOTE — MR AVS SNAPSHOT
[facility] Medicine  [address]  [place] [address]  Phone: [phone]  Fax: [phone]                  Richard Bustos   5/3/2017 9:40 AM   Office Visit    Description:  Male : 1945   Provider:  CALIN Lewis   Department:  [place] [facility]           Reason for Visit     Dizziness     hematoma           Diagnoses this Visit        Comments    Hematoma    -  Primary     Dizziness         Abdominal wall hematoma, subsequent encounter         Type 2 diabetes mellitus with diabetic nephropathy, without long-term current use of insulin                To Do List           Future Appointments        Provider Department Dept Phone    5/3/2017 1:00 PM LAB, SL[facility] SAT [facility] - Lab [phone]    5/3/2017 3:00 PM [facility] [phone]    2017 10:00 AM CALIN Lewis [facility] [phone]    2017 11:00 AM LAB, MOB [address] [facility] [phone]    2017 9:00 AM Adeline Moss MD [facility] - Urology [phone]      Goals (5 Years of Data)     None      [facility]     [facility] Nurse Care [facility] - 24/7 Assistance  Unless otherwise directed by your provider, please contact [facility], our nurse care line that is available for /7 assistance.     Registered nurses in the [facility] provide: appointment scheduling, clinical advisement, health education, and other advisory services.  Call: [phone] (toll free)               Medications           Message regarding Medications     Verify the changes and/or additions to your medication regime listed below are the same as discussed with your clinician today.  If any of these changes or additions are incorrect, please notify your healthcare provider.        STOP taking these medications     doxycycline (VIBRAMYCIN) 100 MG Cap Take 1 capsule (100 mg total) by mouth every 12 (twelve) hours.            Verify that the below list of medications is an accurate representation of the medications you are currently taking.  If none reported, the list may be blank. If incorrect, please contact your healthcare provider. Carry this list with you in case of emergency.           Current Medications     alendronate-vitamin D3 (FOSAMAX PLUS D) 70 mg- 2,800 unit per tablet Take 1 tablet by mouth every 7 days.    allopurinol (ZYLOPRIM) 100 MG tablet TAKE 1 TABLET(100 MG) BY MOUTH EVERY DAY    alprazolam (XANAX) 1 MG tablet Take 1 tablet (1 mg total) by mouth every evening.    atorvastatin (LIPITOR) 80 MG tablet TAKE 1 TABLET(80 MG) BY MOUTH EVERY DAY    calcitRIOL (ROCALTROL) 0.5 MCG Cap     carvedilol (COREG) 25 MG tablet Take 1 tablet (25 mg total) by mouth 2 (two) times daily with meals.    clopidogrel (PLAVIX) 75 mg tablet     ferrous sulfate 325 (65 FE) MG EC tablet Take 1 tablet (325 mg total) by mouth 2 (two) times daily.    fluticasone (FLONASE) 50 mcg/actuation nasal spray 2 sprays by Each Nare route once daily.    FOLIC ACID/MULTIVIT-MIN/LUTEIN (CENTRUM SILVER ORAL) Take 1 tablet by mouth once daily.    furosemide (LASIX) 40 MG tablet TAKE 1 TABLET BY MOUTH DAILY AS NEEDED    hydrocodone-acetaminophen 7.5-325mg (NORCO) 7.5-325 mg per tablet Take 1 tablet by mouth every 6 (six) hours as needed for Pain.    L-METHYL-B6-B12 3-35-2 mg Tab TAKE 1 TABLET BY MOUTH TWICE DAILY    lisinopril (PRINIVIL,ZESTRIL) 20 MG tablet Take 20 mg by mouth 2 (two) times daily.     magnesium oxide (MAG-OX) 400 mg tablet Take 1 tablet (400 mg total) by mouth once daily.    mirabegron (MYRBETRIQ) 50 mg Tb24 Take 1 tablet (50 mg total) by mouth once daily.    nitroGLYCERIN 0.4 MG/DOSE TL SPRY (NITROLINGUAL) 400 mcg/spray spray PLACE 1 SPRAY UNDER THE TONGUE EVERY 5 MINUTES AS NEEDED FOR CHEST PAIN    omeprazole (PRILOSEC) 20 MG capsule Take 20 mg by mouth once daily.    pantoprazole (PROTONIX) 20 MG tablet Take 1 tablet (20 mg total) by  "mouth 2 (two) times daily before meals.    ranitidine (ZANTAC) 150 MG tablet TAKE 1 TABLET(150 MG) BY MOUTH TWICE DAILY    salicylic acid (SALACYN) 6 % Crea Apply 1 application topically 2 (two) times daily.    vit C-vit E-lutein-min-om-3 (OCUVITE) 570-37-2-150 mg-unit-mg-mg Cap Take 1 capsule by mouth once daily.    warfarin (COUMADIN) 5 MG tablet TAKE 1 TABLET BY MOUTH EVERY DAY    ciclopirox (PENLAC) 8 % Soln Apply topically nightly.    ciclopirox 0.77 % Gel Apply topically 2 (two) times daily.    clotrimazole-betamethasone 1-0.05% (LOTRISONE) cream Apply topically 2 (two) times daily. To head of penis    ergocalciferol (ERGOCALCIFEROL) 50,000 unit Cap TK 1 C PO Q WEEK           Clinical Reference Information           Your Vitals Were     BP Pulse Temp Height Weight SpO2    104/63 (BP Location: Right arm, Patient Position: Sitting, BP Method: Automatic) 59 98 °F (36.7 °C) (Oral) 5' 10" (1.778 m) 138.8 kg (306 lb) 95%    BMI                43.91 kg/m2          Blood Pressure          Most Recent Value    BP  104/63      Allergies as of 5/3/2017     Shellfish Containing Products      Immunizations Administered on Date of Encounter - 5/3/2017     None      Orders Placed During Today's Visit      Normal Orders This Visit    POCT Glucose     Future Labs/Procedures Expected by Expires    CBC auto differential  5/3/2017 7/2/2018    Comprehensive metabolic panel  5/3/2017 7/2/2018    US Abdomen Limited  5/3/2017 5/3/2018      Instructions      Controlling High Blood Pressure  High blood pressure (hypertension) is often called the silent killer. This is because many people who have it dont know it. High blood pressure is defined as 140/90 mm Hg or higher. Know your blood pressure and remember to check it regularly. Doing so can save your life. Here are some things you can do to help control your blood pressure.    Choose heart-healthy foods  · Select low-salt, low-fat foods. Limit sodium intake to 2,400 mg per day or " the amount suggested by your healthcare provider.  · Limit canned, dried, cured, packaged, and fast foods. These can contain a lot of salt.  · Eat 8 to 10 servings of fruits and vegetables every day.  · Choose lean meats, fish, or chicken.  · Eat whole-grain pasta, brown rice, and beans.  · Eat 2 to 3 servings of low-fat or fat-free dairy products.  · Ask your doctor about the DASH eating plan. This plan helps reduce blood pressure.  · When you go to a restaurant, ask that your meal be prepared with no added salt.  Maintain a healthy weight  · Ask your healthcare provider how many calories to eat a day. Then stick to that number.  · Ask your healthcare provider what weight range is healthiest for you. If you are overweight, a weight loss of only 3% to 5% of your body weight can help lower blood pressure. Generally, a good weight loss goal is to lose 10% of your body weight in a year.  · Limit snacks and sweets.  · Get regular exercise.  Get up and get active  · Choose activities you enjoy. Find ones you can do with friends or family. This includes bicycling, dancing, walking, and jogging.  · Park farther away from building entrances.  · Use stairs instead of the elevator.  · When you can, walk or bike instead of driving.  · [place] leaves, garden, or do household repairs.  · Be active at a moderate to vigorous level of physical activity for at least 40 minutes for a minimum of 3 to 4 days a week.   Manage stress  · Make time to relax and enjoy life. Find time to laugh.  · Communicate your concerns with your loved ones and your healthcare provider.  · Visit with family and friends, and keep up with hobbies.  Limit alcohol and quit smoking  · Men should have no more than 2 drinks per day.  · Women should have no more than 1 drink per day.  · Talk with your healthcare provider about quitting smoking. Smoking significantly increases your risk for heart disease and stroke. Ask your healthcare provider about community  smoking cessation programs and other options.  Medicines  If lifestyle changes arent enough, your healthcare provider may prescribe high blood pressure medicine. Take all medicines as prescribed. If you have any questions about your medicines, ask your healthcare provider before stopping or changing them.   Date Last Reviewed: 4/27/2016 © 2000-2016 [employer]. [address] [place] [address], [place] [address]. All rights reserved. This information is not intended as a substitute for professional medical care. Always follow your healthcare professional's instructions.        Diabetes (General Information)  Diabetes is a long-term health problem. It means your body does not make enough insulin. Or it may mean that your body cannot use the insulin it makes. Insulin is a hormone in your body. It lets blood sugar (glucose) reach the cells in your body. All of your cells need glucose for fuel.  When you have diabetes, the glucose in your blood builds up because it cannot get into the cells. This buildup is called high blood sugar (hyperglycemia).  Your blood sugar level depends on several things. It depends on what kind of food you eat and how much of it you eat. It also depends on how much exercise you get, and how much insulin you have in your body. Eating too much of the wrong kinds of food or not taking diabetes medicine on time can cause high blood sugar. Infections can cause high blood sugar even if you are taking medicines correctly.  These things can also cause low blood sugar:  · Missing meals  · Not eating enough food  · Taking too much diabetes medicine  Diabetes can cause serious problems over time if you do not get treated. These problems include heart disease, stroke, kidney failure, and blindness. They also include nerve pain or loss of feeling in your legs and feet, and gangrene of the feet. By keeping your blood sugar under control you can prevent or delay these problems.  Normal blood sugar  levels are 80 to 100 before a meal and less than 180 in the 1 to 2 hours after a meal.  Home care  Follow these guidelines when caring for yourself at home:  · Follow the diet your healthcare provider gives you. Take insulin or other diabetes medicine exactly as told to.  · Watch your blood sugar as you are told to. Keep a log of your results. This will help your provider change your medicines to keep your blood sugar under control.  · Try to reach your ideal weight. You may be able to cut back on or not have to take diabetes medicine if you eat the right foods and get exercise.  · Do not smoke. Smoking worsens the effects of diabetes on your circulation. You are much more likely to have a heart attack if you have diabetes and you smoke.  · Take good care of your feet. If you have lost feeling in your feet, you may not see an injury or infection. Check your feet and between your toes at least once a week.  · Wear a medical alert bracelet or necklace, or carry a card in your wallet that says you have diabetes. This will help healthcare providers give you the right care if you get very ill and cannot tell them that you have diabetes.  Sick day plan  If you get a cold, the flu, or a bacterial or viral infection, take these steps:  · Look at your diabetes sick plan and call your healthcare provider as you were told to. You may need to call your provider right away if:  ¨ Your blood sugar is above 240 while taking your diabetes medicine  ¨ Your urine ketone levels are above normal or high  ¨ You have been vomiting more than 6 hours  ¨ You have trouble breathing or your breath ha s a fruity smell  ¨ You have a high fever  ¨ You have a fever for several days and you are not getting better  ¨ You get light-headed and are sleepier than usual  · Keep taking your diabetes pills (oral medicine) even if you have been vomiting and are feeling sick. Call your provider right away because you may need insulin to lower your blood  sugar until you recover from your illness.  · Keep taking your insulin even if you have been vomiting and are feeling sick. Call your provider right away to ask if you need to change your insulin dose. This will depend on your blood sugar results.  · Check your blood sugar every 2 to 4 hours, or at least 4 times a day.  · Check your ketones often. If you are vomiting and having diarrhea, watch them more often.  · Do not skip meals. Try to eat small meals on a regular schedule. Do this even if you do not feel like eating.  · Drink water or other liquids that do not have caffeine or calories. This will keep you from getting dehydrated. If you are nauseated or vomiting, takes small sips every 5 minutes. To prevent dehydration try to drink a cup (8 ounces) of fluids every hour while you are awake.  General care  Always bring a source of fast-acting sugar with you in case you have symptoms of low blood sugar (below 70). At the first sign of low blood sugar, eat or drink 15 to 20 grams of fast-acting sugar to raise your blood sugar. Examples are:  · 3 to 4 glucose tablets. You can buy these at most [employer].  · 4 ounces (1/2 cup) of regular (not diet) soft drinks  · 4 ounces (1/2 cup) of any fruit juice  · 8 ounces (1 cup) of milk  · 5 to 6 pieces of hard candy  · 1 tablespoon of honey  Check your blood sugar 15 minutes after treating yourself. If it is still below 70, take 15 to 20 more grams of fast-acting sugar. Test again in 15 minutes. If it returns to normal (70 or above), eat a snack or meal to keep your blood sugar in a safe range. If it stays low, call your doctor or go to an emergency room.  Follow-up care  Follow-up with your healthcare provider, or as advised. For more information about diabetes, visit the American Diabetes Association website at www.diabetes.org or call 904-219-7018.  When to seek medical advice  Call your healthcare provider right away if you have any of these symptoms of high blood  sugar:  · Frequent urination  · Dizziness  · Drowsiness  · Thirst  · Headache  · Nausea or vomiting  · Abdominal pain  · Eyesight changes  · Fast breathing  · Confusion or loss of consciousness  Also call your provider right away if you have any of these signs of low blood sugar:  · Fatigue  · Headache  · Shakes  · Excess sweating  · Hunger  · Feeling anxious or restless  · Eyesight changes  · Drowsiness  · Weakness  · Confusion or loss of consciousness  Call 911  Call for emergency help right away if any of these occur:  · Chest pain or shortness of breath  · Dizziness or fainting  · Weakness of an arm or leg or one side of the face  · Trouble speaking or seeing   Date Last Reviewed: 6/1/2016  © 3644-0773 [employer]. [address] [place] [address], [place] [address]. All rights reserved. This information is not intended as a substitute for professional medical care. Always follow your healthcare professional's instructions.        Weight Management: Getting Started  Healthy bodies come in all shapes and sizes. Not all bodies are made to be thin. For some people, a healthy weight is higher than the average weight listed on weight charts. Your healthcare provider can help you decide on a healthy weight for you.    Reasons to lose weight  Losing weight can help with some health problems, such as high blood pressure, heart disease, diabetes, sleep apnea, and arthritis. You may also feel more energy.  Set your long-term goal  Your goal doesn't even have to be a specific weight. You may decide on a fitness goal (such as being able to walk 10 miles a week), or a health goal (such as lowering your blood pressure). Choose a goal that is measurable and reasonable, so you know when you've reached it. A goal of reaching a BMI of less than 25 is not always reasonable (or possible).   Make an action plan  Habits dont change overnight. Setting your goals too high can leave you feeling discouraged if you cant reach them.  Be realistic. Choose one or two small changes you can make now. Set an action plan for how you are going to make these changes. When you can stick to this plan, keep making a few more small changes. Taking small steps will help you stay on the path to success.  Track your progress  Write down your goals. Then, keep a daily record of your progress. Write down what you eat and how active you are. This record lets you look back on how much youve done. It may also help when youre feeling frustrated. Reward yourself for success. Even if you dont reach every goal, give yourself credit for what you do get done.  Get support  Encouragement from others can help make losing weight easier. Ask your family members and friends for support. They may even want to join you. Also look to your healthcare provider, registered dietitian, and  for help. Your local hospital can give you more information about nutrition, exercise, and weight loss.  Date Last Reviewed: 1/31/2016 © 2000-2016 [employer]. [address] [place] [address]. All rights reserved. This information is not intended as a substitute for professional medical care. Always follow your healthcare professional's instructions.        Walking for Fitness  Fitness walking has something for everyone, even people who are already fit. Walking is one of the safest ways to condition your body aerobically. It can boost energy, help you lose weight, and reduce stress.    Physical benefits  · Walking strengthens your heart and lungs, and tones your muscles.  · When walking, your feet land with less impact than in other sports. This reduces chances of muscle, bone, and joint injury.  · Regular walking improves your cholesterol levels and lowers your risk of heart disease. And it helps you control your blood sugar if you have diabetes.  · Walking is a weight-bearing activity, which helps maintain bone density. This can help prevent  osteoporosis.  Personal rewards  · Taking walks can help you relax and manage stress. And fitness walking may make you feel better about yourself.  · Walking can help you sleep better at night and make you less likely to be depressed.  · Regular walking may help maintain your memory as you get older.  · Walking is a great way to spend extra time with friends and family members. Be sure to invite your dog along!  Q&A about fitness walking  Q: Will walking keep me fit?  A: Yes. Regular walking at the right pace gives you all the benefits of other aerobic activities, such as jogging and swimming.  Q: Will walking help me lose weight and keep it off?  A: Yes. Per mile, walking can burn as many calories as jogging. Your health care provider can help work walking into your weight-loss plan.  Q: Is walking safe for my health?  A: Yes. Walking is safe if you have high blood pressure, diabetes, heart disease, or other conditions. Talk to your health care provider before you start.  Date Last Reviewed: 5/9/2015 © 2000-2016 [employer]. [address] [place] [address]. All rights reserved. This information is not intended as a substitute for professional medical care. Always follow your healthcare professional's instructions.             Language Assistance Services     ATTENTION: Language assistance services are available, free of charge. Please call [phone].      ATENCIÓN: Si habla español, tiene a cruz disposición servicios gratuitos de asistencia lingüística. Llame al [phone].     CHÚ Ý: N?u b?n nói Ti?ng Vi?t, có các d?ch v? h? tr? ngôn ng? mi?n phí dành cho b?n. G?i s? [phone].         [facility] complies with applicable Federal civil rights laws and does not discriminate on the basis of race, color, national origin, age, disability, or sex.

## 2017-05-06 DIAGNOSIS — I25.10 CORONARY ARTERY DISEASE INVOLVING NATIVE CORONARY ARTERY OF NATIVE HEART WITHOUT ANGINA PECTORIS: Chronic | ICD-10-CM

## 2017-05-07 DIAGNOSIS — K21.9 GASTROESOPHAGEAL REFLUX DISEASE, ESOPHAGITIS PRESENCE NOT SPECIFIED: ICD-10-CM

## 2017-05-07 DIAGNOSIS — M54.5 CHRONIC LOW BACK PAIN, UNSPECIFIED BACK PAIN LATERALITY, WITH SCIATICA PRESENCE UNSPECIFIED: ICD-10-CM

## 2017-05-07 DIAGNOSIS — F41.9 ANXIETY: ICD-10-CM

## 2017-05-07 DIAGNOSIS — G89.29 CHRONIC LOW BACK PAIN, UNSPECIFIED BACK PAIN LATERALITY, WITH SCIATICA PRESENCE UNSPECIFIED: ICD-10-CM

## 2017-05-08 RX ORDER — CLOPIDOGREL BISULFATE 75 MG/1
TABLET ORAL
Qty: 90 TABLET | Refills: 3 | Status: SHIPPED | OUTPATIENT
Start: 2017-05-08 | End: 2017-05-09 | Stop reason: SDUPTHER

## 2017-05-08 NOTE — TELEPHONE ENCOUNTER
----- Message from Basilia Luis sent at 5/8/2017 11:54 AM CDT -----  Contact: Daughter- Altoe-[phone]  Patient needs a refill on rx Fosamax plus D 2800 mg with [facility]  pharmacy [phone]. Thanks!

## 2017-05-08 NOTE — TELEPHONE ENCOUNTER
----- Message from Basilia Luis sent at 5/8/2017 11:52 AM CDT -----  Contact: Daughter-Citlali-   [phone]  Patient needs a refill on rx omeprazole 20 mg, rx clopidogrel 75 mg, rx xanax 1 mg, rx norco with [facility] [phone]. Thanks!

## 2017-05-09 DIAGNOSIS — G89.29 CHRONIC LOW BACK PAIN, UNSPECIFIED BACK PAIN LATERALITY, WITH SCIATICA PRESENCE UNSPECIFIED: ICD-10-CM

## 2017-05-09 DIAGNOSIS — M54.5 CHRONIC LOW BACK PAIN, UNSPECIFIED BACK PAIN LATERALITY, WITH SCIATICA PRESENCE UNSPECIFIED: ICD-10-CM

## 2017-05-09 DIAGNOSIS — F41.9 ANXIETY: ICD-10-CM

## 2017-05-09 DIAGNOSIS — I25.10 CORONARY ARTERY DISEASE INVOLVING NATIVE CORONARY ARTERY OF NATIVE HEART WITHOUT ANGINA PECTORIS: Chronic | ICD-10-CM

## 2017-05-09 RX ORDER — CLOPIDOGREL BISULFATE 75 MG/1
75 TABLET ORAL DAILY
Qty: 90 TABLET | Refills: 11 | Status: SHIPPED | OUTPATIENT
Start: 2017-05-09 | End: 2018-09-28

## 2017-05-09 RX ORDER — HYDROCODONE BITARTRATE AND ACETAMINOPHEN 7.5; 325 MG/1; MG/1
1 TABLET ORAL EVERY 6 HOURS PRN
Qty: 90 TABLET | Refills: 0 | Status: SHIPPED | OUTPATIENT
Start: 2017-05-09 | End: 2017-06-12 | Stop reason: SDUPTHER

## 2017-05-09 RX ORDER — HYDROCODONE BITARTRATE AND ACETAMINOPHEN 7.5; 325 MG/1; MG/1
1 TABLET ORAL EVERY 6 HOURS PRN
Qty: 90 TABLET | Refills: 0 | Status: SHIPPED | OUTPATIENT
Start: 2017-05-09 | End: 2017-06-12

## 2017-05-09 RX ORDER — ALPRAZOLAM 1 MG/1
1 TABLET ORAL NIGHTLY
Qty: 30 TABLET | Refills: 0 | Status: SHIPPED | OUTPATIENT
Start: 2017-05-09 | End: 2017-08-06 | Stop reason: SDUPTHER

## 2017-05-09 RX ORDER — ALPRAZOLAM 1 MG/1
1 TABLET ORAL NIGHTLY
Qty: 30 TABLET | Refills: 0 | Status: SHIPPED | OUTPATIENT
Start: 2017-05-09 | End: 2017-06-12 | Stop reason: SDUPTHER

## 2017-05-09 RX ORDER — OMEPRAZOLE 20 MG/1
20 CAPSULE, DELAYED RELEASE ORAL DAILY
Qty: 30 CAPSULE | Refills: 3 | Status: SHIPPED | OUTPATIENT
Start: 2017-05-09 | End: 2017-12-26 | Stop reason: SDUPTHER

## 2017-05-09 RX ORDER — OMEPRAZOLE 20 MG/1
CAPSULE, DELAYED RELEASE ORAL
Qty: 180 CAPSULE | Refills: 0 | Status: SHIPPED | OUTPATIENT
Start: 2017-05-09 | End: 2017-09-13 | Stop reason: SDUPTHER

## 2017-05-09 NOTE — TELEPHONE ENCOUNTER
----- Message from Carrie Love sent at 5/9/2017 12:20 PM CDT -----  Daughter (Citlali) stated she left previous message requesting refills of medications: Omeprazole 20 mg (takes twice daily 90 day supply)Xanax 1 mg and Norco (monthly)/please order and call back at [phone] to confirm.

## 2017-05-09 NOTE — TELEPHONE ENCOUNTER
Spoke with daughter Citlali (involvment in care) she stated that she has been trying to get these medications refill. She stated that he dad gets 90 day.    Last refills    Xanax- 4-  Norco- 4-  Prilosec- 8-1-2016    Last office visit 5-3-2017 with Marina Cardenas  Next office visit- 10-5-2017 with Dr. Ramirez

## 2017-05-15 ENCOUNTER — OFFICE VISIT (OUTPATIENT)
Dept: ORTHOPEDICS | Facility: CLINIC | Age: 72
End: 2017-05-15
Payer: MEDICARE

## 2017-05-15 VITALS
SYSTOLIC BLOOD PRESSURE: 139 MMHG | DIASTOLIC BLOOD PRESSURE: 67 MMHG | HEIGHT: 70 IN | BODY MASS INDEX: 43.81 KG/M2 | WEIGHT: 306 LBS | HEART RATE: 62 BPM

## 2017-05-15 DIAGNOSIS — M54.16 LUMBAR RADICULOPATHY, RIGHT: Primary | ICD-10-CM

## 2017-05-15 DIAGNOSIS — T84.050A PERIPROSTHETIC OSTEOLYSIS OF INTERNAL PROSTHETIC RIGHT HIP JOINT, INITIAL ENCOUNTER: ICD-10-CM

## 2017-05-15 PROCEDURE — 99213 OFFICE O/P EST LOW 20 MIN: CPT | Mod: S$GLB,,, | Performed by: ORTHOPAEDIC SURGERY

## 2017-05-15 PROCEDURE — 1125F AMNT PAIN NOTED PAIN PRSNT: CPT | Mod: S$GLB,,, | Performed by: ORTHOPAEDIC SURGERY

## 2017-05-15 PROCEDURE — 1159F MED LIST DOCD IN RCRD: CPT | Mod: S$GLB,,, | Performed by: ORTHOPAEDIC SURGERY

## 2017-05-15 PROCEDURE — 99999 PR PBB SHADOW E&M-EST. PATIENT-LVL III: CPT | Mod: PBBFAC,,, | Performed by: ORTHOPAEDIC SURGERY

## 2017-05-15 PROCEDURE — 3075F SYST BP GE 130 - 139MM HG: CPT | Mod: S$GLB,,, | Performed by: ORTHOPAEDIC SURGERY

## 2017-05-15 PROCEDURE — 3078F DIAST BP <80 MM HG: CPT | Mod: S$GLB,,, | Performed by: ORTHOPAEDIC SURGERY

## 2017-05-15 PROCEDURE — 1157F ADVNC CARE PLAN IN RCRD: CPT | Mod: S$GLB,,, | Performed by: ORTHOPAEDIC SURGERY

## 2017-05-15 PROCEDURE — 1160F RVW MEDS BY RX/DR IN RCRD: CPT | Mod: S$GLB,,, | Performed by: ORTHOPAEDIC SURGERY

## 2017-05-15 NOTE — PROGRESS NOTES
This note was created using Dragon dictation software. The program will occasionally misinterpret certain words or phrases.   Past medical history, surgical history, medications, allergies, family history and social history are all reviewed and in their proper sections.     Chief complaint: Right hip pain    History of present illness: This is a 70-year-old male With multiple bilateral hip surgeries.  Patient comes in to get his hips checked .  He is having more, buttock and leg pain.  I recommended that he see Dr. Causey last time but he did not go.  Pain with walking.  Right hip is definitely the worst.  Pain radiates down to the knee at times.  Pain as a 6 out of 10.    Review of Systems   Constitution: Negative for chills and fever.   HENT: Negative for headaches and blurry vision.   Cardiovascular: Negative for chest pain, irregular heartbeat, leg swelling and palpitations.   Respiratory: Negative for cough and shortness of breath.   Endocrine: Negative for polyuria.   Hematologic/Lymphatic: Positive for bleeding problem. Does not bruise/bleed easily.   Skin: Negative for poor wound healing and rash.   Musculoskeletal: Positive for joint pain. Negative for arthritis, joint swelling, muscle weakness and myalgias.   Gastrointestinal: Negative for abdominal pain, heartburn, melena, nausea and vomiting.   Genitourinary: Negative for bladder incontinence and dysuria.   Neurological: Positive for numbness.   Psychiatric/Behavioral: Negative for depression. The patient is not nervous/anxious.     Physical examination:   Vital signs are entered in their proper section.   This is a well-developed, well-nourished patient in no acute distress. They are alert and oriented and cooperative to examination. Pt walks without an antalgic gait.     Examination of the patient's bilateral hips shows full range of motion with flexion to 160°, extension to 0, external rotation to 50°, internal rotation of 15°, abduction of 50°, adduction  of 15°. Skin has no rashes or bruising.  Healed surgical incisions.  Patient has negative Stinchfield exam. Patient has negative straight leg raise.Negative internal impingement test.  Patient has mild pain with hip range of motion. Nontender to palpation over the greater trochanteric bursa. Patient is 5 out of 5 motor strength, palpable distal pulses, and intact light touch sensation.       X-rays: X-rays of the pelvis and bilateral hips show bilateral hip replacements in place.  Signs of previous heterotopic ossification.  No significant change when compared to 2 years ago.  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  I Recommended consultation with Dr. Causey to evaluate his low back and leg weakness.  I also gave him Dr. Farooq's name to possibly discuss revision of the right hip replacement.

## 2017-05-16 ENCOUNTER — TELEPHONE (OUTPATIENT)
Dept: PAIN MEDICINE | Facility: CLINIC | Age: 72
End: 2017-05-16

## 2017-05-16 NOTE — TELEPHONE ENCOUNTER
----- Message from Alesia Frausto LPN sent at 5/16/2017  1:34 PM CDT -----  Please contact patient to schedule a NP appointment for evaluation and treatment of lumbar. Referral entered.    Thanks!

## 2017-05-22 ENCOUNTER — TELEPHONE (OUTPATIENT)
Dept: UROLOGY | Facility: CLINIC | Age: 72
End: 2017-05-22

## 2017-05-22 ENCOUNTER — OFFICE VISIT (OUTPATIENT)
Dept: RADIATION ONCOLOGY | Facility: CLINIC | Age: 72
End: 2017-05-22
Payer: MEDICARE

## 2017-05-22 VITALS
SYSTOLIC BLOOD PRESSURE: 142 MMHG | BODY MASS INDEX: 43.81 KG/M2 | HEART RATE: 50 BPM | WEIGHT: 306 LBS | HEIGHT: 70 IN | DIASTOLIC BLOOD PRESSURE: 84 MMHG | RESPIRATION RATE: 22 BRPM | TEMPERATURE: 98 F

## 2017-05-22 DIAGNOSIS — C61 PROSTATE CANCER: Primary | ICD-10-CM

## 2017-05-22 PROCEDURE — 99214 OFFICE O/P EST MOD 30 MIN: CPT | Mod: ,,, | Performed by: RADIOLOGY

## 2017-05-22 NOTE — TELEPHONE ENCOUNTER
----- Message from Adeline Moss MD sent at 5/22/2017 12:51 PM CDT -----  Fu in 3 months with a psa prior

## 2017-05-22 NOTE — PROGRESS NOTES
Richard Bustos  [id number]  1945 5/22/2017  Adeline Moss Md  [address]  [address]  [place] [address]    DIAGNOSIS:     ICD-10-CM ICD-9-CM    1. Prostate cancer C61 185      REASON FOR VISIT: Routine scheduled follow-up.    HISTORY OF PRESENT ILLNESS:   Pt w high risk prosate ca  [place] 8 psa 7 sp xrtv 8/16 to 75.6 Gy.  Last psa in 12/16 at undetecable.  Pt on Anro Block Tx      INTERVAL HISTORY:   Good urine stream flow. No Blood in Urine nor stool. No bone pain    Review of Systems   Constitutional: Negative for chills and fever.   HENT:   Negative for hearing loss, nosebleeds and sore throat.    Eyes: Negative for eye problems and icterus.   Respiratory: Negative for chest tightness, hemoptysis and shortness of breath.    Cardiovascular: Negative for chest pain and leg swelling.   Gastrointestinal: Negative for abdominal pain and blood in stool.   Genitourinary: Negative for dysuria and frequency.    Musculoskeletal: Negative for back pain and myalgias.   Skin: Negative for itching and rash.   Neurological: Negative for headaches and numbness.   Psychiatric/Behavioral: Negative for depression. The patient is not nervous/anxious.      Past Medical History:   Diagnosis Date    Anemia     Anticoagulant long-term use     Aorta aneurysm     Arthritis     Atrial fibrillation     CAD (coronary artery disease)     CHF (congestive heart failure)     Chronic kidney disease     Colon polyp     Diabetes mellitus     Diabetes mellitus type II     Diverticulosis     Elevated PSA     Encounter for blood transfusion     Former smoker     GERD (gastroesophageal reflux disease)     Gout     Hx of colonic polyps     Hypercoagulable state     Hyperlipidemia     Hypertension     MI, old 2010    Myocardial infarction     9/2010    Prostate cancer 06/2016    Sleep apnea     Venous stasis     Chronic     Past Surgical History:   Procedure Laterality Date    ABDOMINAL SURGERY      CARDIAC  SURGERY      COLONOSCOPY  02/2011    diverticulosis with diverticula with clot, no active bleeding    COLONOSCOPY N/A 8/24/2016    Procedure: COLONOSCOPY;  Surgeon: Saroj Borjas MD;  Location: [facility];  Service: Endoscopy;  Laterality: N/A;    GASTRIC BYPASS  2/5/2008    IVC FILTER RETRIEVAL      JOINT REPLACEMENT  1996 and 2001    bi-lat hip replacement/Rt Hip and Lt Hip    ROTATOR CUFF REPAIR      Rt shoulder    Stents  8/18/2010    x 3    UPPER GASTROINTESTINAL ENDOSCOPY  02/2011     Social History     Social History    Marital status: Single     Spouse name: N/A    Number of children: N/A    Years of education: N/A     Social History Main Topics    Smoking status: Former Smoker    Smokeless tobacco: Never Used      Comment: 45 yrs ago, only smoked 3-4 packs in his entire life as a teenager    Alcohol use Yes      Comment: rare    Drug use: No    Sexual activity: Not Asked     Other Topics Concern    None     Social History Narrative    None     Family History   Problem Relation Age of Onset    Heart attack Mother     Heart attack Father     Heart attack Brother     Ulcerative colitis Daughter 35    Colon cancer Neg Hx     Colon polyps Neg Hx     Crohn's disease Neg Hx      Medication List with Changes/Refills   Current Medications    ALENDRONATE-VITAMIN D3 (FOSAMAX PLUS D) 70 MG- 2,800 UNIT PER TABLET    Take 1 tablet by mouth every 7 days.    ALLOPURINOL (ZYLOPRIM) 100 MG TABLET    TAKE 1 TABLET(100 MG) BY MOUTH EVERY DAY    ALPRAZOLAM (XANAX) 1 MG TABLET    Take 1 tablet (1 mg total) by mouth every evening.    ALPRAZOLAM (XANAX) 1 MG TABLET    Take 1 tablet (1 mg total) by mouth every evening.    ATORVASTATIN (LIPITOR) 80 MG TABLET    TAKE 1 TABLET(80 MG) BY MOUTH EVERY DAY    CALCITRIOL (ROCALTROL) 0.5 MCG CAP        CARVEDILOL (COREG) 25 MG TABLET    Take 1 tablet (25 mg total) by mouth 2 (two) times daily with meals.    CICLOPIROX (PENLAC) 8 % SOLN    Apply topically nightly.     CICLOPIROX 0.77 % GEL    Apply topically 2 (two) times daily.    CLOPIDOGREL (PLAVIX) 75 MG TABLET    Take 1 tablet (75 mg total) by mouth once daily.    CLOTRIMAZOLE-BETAMETHASONE 1-0.05% (LOTRISONE) CREAM    Apply topically 2 (two) times daily. To head of penis    ERGOCALCIFEROL (ERGOCALCIFEROL) 50,000 UNIT CAP    TK 1 C PO Q WEEK    FERROUS SULFATE 325 (65 FE) MG EC TABLET    Take 1 tablet (325 mg total) by mouth 2 (two) times daily.    FLUTICASONE (FLONASE) 50 MCG/ACTUATION NASAL SPRAY    2 sprays by Each Nare route once daily.    FOLIC ACID/MULTIVIT-MIN/LUTEIN (CENTRUM SILVER ORAL)    Take 1 tablet by mouth once daily.    FUROSEMIDE (LASIX) 40 MG TABLET    TAKE 1 TABLET BY MOUTH DAILY AS NEEDED    HYDROCODONE-ACETAMINOPHEN 7.5-325MG (NORCO) 7.5-325 MG PER TABLET    Take 1 tablet by mouth every 6 (six) hours as needed for Pain.    HYDROCODONE-ACETAMINOPHEN 7.5-325MG (NORCO) 7.5-325 MG PER TABLET    Take 1 tablet by mouth every 6 (six) hours as needed for Pain.    L-METHYL-B6-B12 3-35-2 MG TAB    TAKE 1 TABLET BY MOUTH TWICE DAILY    LISINOPRIL (PRINIVIL,ZESTRIL) 20 MG TABLET    Take 20 mg by mouth 2 (two) times daily.     MAGNESIUM OXIDE (MAG-OX) 400 MG TABLET    Take 1 tablet (400 mg total) by mouth once daily.    MIRABEGRON (MYRBETRIQ) 50 MG TB24    Take 1 tablet (50 mg total) by mouth once daily.    NITROGLYCERIN 0.4 MG/DOSE TL SPRY (NITROLINGUAL) 400 MCG/SPRAY SPRAY    PLACE 1 SPRAY UNDER THE TONGUE EVERY 5 MINUTES AS NEEDED FOR CHEST PAIN    OMEPRAZOLE (PRILOSEC) 20 MG CAPSULE    TAKE 1 CAPSULE(20 MG) BY MOUTH TWICE DAILY    OMEPRAZOLE (PRILOSEC) 20 MG CAPSULE    Take 1 capsule (20 mg total) by mouth once daily.    RANITIDINE (ZANTAC) 150 MG TABLET    TAKE 1 TABLET(150 MG) BY MOUTH TWICE DAILY    SALICYLIC ACID (SALACYN) 6 % CREA    Apply 1 application topically 2 (two) times daily.    VIT C-VIT E-LUTEIN-MIN-OM-3 (OCUVITE) 958-41-1-150 MG-UNIT-MG-MG CAP    Take 1 capsule by mouth once daily.    WARFARIN  "(COUMADIN) 5 MG TABLET    TAKE 1 TABLET BY MOUTH EVERY DAY     Review of patient's allergies indicates:   Allergen Reactions    Shellfish containing products Other (See Comments)     Other reaction(s): Gout       QUALITY OF LIFE: 80%- Normal Activity with Effort: Some Symptoms of Disease    Vitals:    05/22/17 0944   BP: (!) 142/84   Pulse: (!) 50   Resp: (!) 22   Temp: 98 °F (36.7 °C)   TempSrc: Oral   Weight: (!) 138.8 kg (306 lb)   Height: 5' 10" (1.778 m)   PainSc:   5   PainLoc: Back       PHYSICAL EXAM:   GENERAL: alert; in no apparent distress.   HEAD: normocephalic, atraumatic.  EYES: pupils are equal, round, reactive to light and accommodation. Sclera anicteric. Conjunctiva not injected.   NOSE/THROAT: no nasal erythema or rhinorrhea. Oropharynx pink, without erythema, ulcerations or thrush.   NECK: no cervical motion rigidity; supple with no masses.  CHEST: clear to auscultation bilaterally; no wheezes, crackles or rubs. Patient is speaking comfortably on room air with normal work of breathing without using accessory muscles of respiration.  CARDIOVASCULAR: regular rate and rhythm; no murmurs, rubs or gallops.  ABDOMEN: soft, nontender, nondistended. Bowel sounds present.   MUSCULOSKELETAL: no tenderness to palpation along the spine or scapulae. Normal range of motion.  NEUROLOGIC: cranial nerves II-XII intact bilaterally. Strength 5/5 in bilateral upper and lower extremities. No sensory deficits appreciated. Reflexes globally intact. No cerebellar signs. Normal gait.  LYMPHATIC: no cervical, supraclavicular or axillary adenopathy appreciated bilaterally.   EXTREMITIES: no clubbing, cyanosis, edema.  SKIN: no erythema, rashes, ulcerations noted.     ANCILLARY DATA:     ASSESSMENT: doing well. PSA low  PLAN:  rec PSA at least 4-6 month intervals    All questions answered and contact information provided. Patient understands free to call us anytime with any questions or concerns regarding radiation " therapy.    TIME SPENT WITH PATIENT: I have personally seen and evaluated this patient. Approximately 30 minutes were spent with the patient discussing follow-up careplan.     PHYSICIAN: Anirudh Herman MD

## 2017-05-26 DIAGNOSIS — I48.0 PAF (PAROXYSMAL ATRIAL FIBRILLATION): Primary | ICD-10-CM

## 2017-05-26 LAB — INR PPP: 1.8

## 2017-05-30 ENCOUNTER — ANTI-COAG VISIT (OUTPATIENT)
Dept: CARDIOLOGY | Facility: CLINIC | Age: 72
End: 2017-05-30

## 2017-05-30 DIAGNOSIS — D68.59 HYPERCOAGULABLE STATE: ICD-10-CM

## 2017-05-30 DIAGNOSIS — Z79.01 LONG TERM (CURRENT) USE OF ANTICOAGULANTS: ICD-10-CM

## 2017-05-30 LAB
FASTING STATUS: NO
INR PPP: 1.8 (ref 0.8–1.2)
PROTHROMBIN TIME: 21.4 SECOND(S) (ref 12.3–14.7)

## 2017-05-31 ENCOUNTER — OFFICE VISIT (OUTPATIENT)
Dept: PAIN MEDICINE | Facility: CLINIC | Age: 72
End: 2017-05-31
Payer: MEDICARE

## 2017-05-31 VITALS
WEIGHT: 306 LBS | BODY MASS INDEX: 43.81 KG/M2 | HEART RATE: 54 BPM | HEIGHT: 70 IN | DIASTOLIC BLOOD PRESSURE: 79 MMHG | SYSTOLIC BLOOD PRESSURE: 128 MMHG

## 2017-05-31 DIAGNOSIS — M51.36 DDD (DEGENERATIVE DISC DISEASE), LUMBAR: ICD-10-CM

## 2017-05-31 DIAGNOSIS — M47.819 FACET ARTHROPATHY: Primary | ICD-10-CM

## 2017-05-31 PROCEDURE — 99204 OFFICE O/P NEW MOD 45 MIN: CPT | Mod: S$GLB,,, | Performed by: ANESTHESIOLOGY

## 2017-05-31 PROCEDURE — 99999 PR PBB SHADOW E&M-EST. PATIENT-LVL III: CPT | Mod: PBBFAC,,, | Performed by: ANESTHESIOLOGY

## 2017-05-31 PROCEDURE — 99499 UNLISTED E&M SERVICE: CPT | Mod: S$PBB,,, | Performed by: ANESTHESIOLOGY

## 2017-05-31 PROCEDURE — 1125F AMNT PAIN NOTED PAIN PRSNT: CPT | Mod: S$GLB,,, | Performed by: ANESTHESIOLOGY

## 2017-05-31 PROCEDURE — 1159F MED LIST DOCD IN RCRD: CPT | Mod: S$GLB,,, | Performed by: ANESTHESIOLOGY

## 2017-05-31 NOTE — PROGRESS NOTES
This note was completed with dictation software and grammatical errors may exist.    Referring Physician: Erick Delaney,*    PCP: Familia Ramirez Jr, MD      CC: Lower back pain    HPI:   Richard Bustos is a 71 y.o. male referred to us for lower back pain.  Pain has gradually worsened over past 2 months.  No recent traumatic incident.  His intermittent aching, burning, throbbing pain in his lower back.  Pain occasionally travels down his posterior thighs into his posterior calves.  Pain worsens standing, walking, lifting and getting up.  Pain improves with rest.  He does have history of bilateral hip replacements and has been evaluated by orthopedics.  He was told that his pain may be due to his lumbar spine.  He has tried physical therapy with minimal benefit.  He recently had updated lumbar MRI.  He denies any weakness.  No bowel bladder changes.  Lower back pain is more bothersome.  He rates his pain 6/10 today.    ROS:  CONSTITUTIONAL: No fevers, chills, night sweats, wt. loss, appetite changes  SKIN: no rashes or itching  ENT: No headaches, head trauma, vision changes, or eye pain  LYMPH NODES: None noticed   CV: No chest pain, palpitations.   RESP: No shortness of breath, dyspnea on exertion, cough, wheezing, or hemoptysis  GI: No nausea, emesis, diarrhea, constipation, melena, hematochezia, pain.    : No dysuria, hematuria, urgency, or frequency   HEME: No easy bruising, bleeding problems  PSYCHIATRIC: No depression, anxiety, psychosis, hallucinations.  NEURO: No seizures, memory loss, dizziness or difficulty sleeping  MSK: + History of present illness      Past Medical History:   Diagnosis Date    Anemia     Anticoagulant long-term use     Aorta aneurysm     Arthritis     Atrial fibrillation     CAD (coronary artery disease)     CHF (congestive heart failure)     Chronic kidney disease     Colon polyp     Diabetes mellitus     Diabetes mellitus type II     Diverticulosis     Elevated  "PSA     Encounter for blood transfusion     Former smoker     GERD (gastroesophageal reflux disease)     Gout     Hx of colonic polyps     Hypercoagulable state     Hyperlipidemia     Hypertension     MI, old 2010    Myocardial infarction     9/2010    Prostate cancer 06/2016    Sleep apnea     Venous stasis     Chronic     Past Surgical History:   Procedure Laterality Date    ABDOMINAL SURGERY      CARDIAC SURGERY      COLONOSCOPY  02/2011    diverticulosis with diverticula with clot, no active bleeding    COLONOSCOPY N/A 8/24/2016    Procedure: COLONOSCOPY;  Surgeon: Saroj Borjas MD;  Location: [facility];  Service: Endoscopy;  Laterality: N/A;    GASTRIC BYPASS  2/5/2008    IVC FILTER RETRIEVAL      JOINT REPLACEMENT  1996 and 2001    bi-lat hip replacement/Rt Hip and Lt Hip    ROTATOR CUFF REPAIR      Rt shoulder    Stents  8/18/2010    x 3    UPPER GASTROINTESTINAL ENDOSCOPY  02/2011     Family History   Problem Relation Age of Onset    Heart attack Mother     Heart attack Father     Heart attack Brother     Ulcerative colitis Daughter 35    Colon cancer Neg Hx     Colon polyps Neg Hx     Crohn's disease Neg Hx      Social History     Social History    Marital status:      Spouse name: N/A    Number of children: N/A    Years of education: N/A     Social History Main Topics    Smoking status: Former Smoker    Smokeless tobacco: Never Used      Comment: 45 yrs ago, only smoked 3-4 packs in his entire life as a teenager    Alcohol use Yes      Comment: rare    Drug use: No    Sexual activity: Not Asked     Other Topics Concern    None     Social History Narrative    None         Medications/Allergies: See med card    Vitals:    05/31/17 1034   BP: 128/79   Pulse: (!) 54   Weight: (!) 138.8 kg (306 lb)   Height: 5' 10" (1.778 m)   PainSc:   6         Physical exam:    GENERAL: A and O x3, the patient appears well groomed and is in no acute distress.  Skin: No " rashes or obvious lesions  HEENT: normocephalic, atraumatic  CARDIOVASCULAR:  Palpable peripheral pulses  LUNGS: easy work of breathing  ABDOMEN: soft, nontender   UPPER EXTREMITIES: Normal alignment, normal range of motion, no atrophy, no skin changes,  hair growth and nail growth normal and equal bilaterally. No swelling, no tenderness.    LOWER EXTREMITIES:  Normal alignment, normal range of motion, no atrophy, no skin changes,  hair growth and nail growth normal and equal bilaterally. No swelling, no tenderness.    LUMBAR SPINE  Lumbar spine: ROM is limited with flexion extension and oblique extension with moderate increased pain.    Parviz's test causes no increased pain on either side.    Supine straight leg raise is negative bilaterally.    Internal and external rotation of the hip causes no increased pain on either side.  Myofascial exam: No tenderness to palpation across lumbar paraspinous muscles.      MENTAL STATUS: normal orientation, speech, language, and fund of knowledge for social situation.  Emotional state appropriate.    CRANIAL NERVES:  II:  PERRL bilaterally,   III,IV,VI: EOMI.    V:  Facial sensation equal bilaterally  VII:  Facial motor function normal.  VIII:  Hearing equal to finger rub bilaterally  IX/X: Gag normal, palate symmetric  XI:  Shoulder shrug equal, head turn equal  XII:  Tongue midline without fasciculations      MOTOR: Tone and bulk: normal bilateral upper and lower Strength: normal   Delt Bi Tri WE WF     R 5 5 5 5 5 5   L 5 5 5 5 5 5     IP ADD ABD Quad TA Gas HAM  R 5 5 5 5 5 5 5  L 5 5 5 5 5 5 5    SENSATION: Light touch and pinprick intact bilaterally  REFLEXES: normal, symmetric, nonbrisk.  Toes down, no clonus. No hoffmans.  GAIT: normal rise, base, steps, and arm swing.        Imaging:  Requesting    Assessment:  Patient referred for lower back pain  1. Facet arthropathy    2. DDD (degenerative disc disease), lumbar          Plan:  - I have stressed the importance  of physical activity and exercise to improve overall health  - Request past imaging  - I believe his low back pain maybe due to facet arthropathy and have recommended lumbar medial branch blocks as a diagnostic procedure.  If successful, would proceed with radiofrequency ablation.  - Follow up after procedure      Thank you for referring this interesting patient, and I look forward to continuing to collaborate in his care.

## 2017-05-31 NOTE — PROGRESS NOTES
Spoke with pts daughter gave dosing and next inr check date pts daughter  voiced understanding.

## 2017-06-02 DIAGNOSIS — I10 ESSENTIAL HYPERTENSION: ICD-10-CM

## 2017-06-03 RX ORDER — MAGNESIUM OXIDE 400 MG
TABLET ORAL
Qty: 90 TABLET | Refills: 3 | Status: SHIPPED | OUTPATIENT
Start: 2017-06-03 | End: 2018-06-09 | Stop reason: SDUPTHER

## 2017-06-04 DIAGNOSIS — D50.9 IRON DEFICIENCY ANEMIA: ICD-10-CM

## 2017-06-04 RX ORDER — FERROUS SULFATE 325(65) MG
TABLET ORAL
Qty: 180 TABLET | Refills: 3 | Status: SHIPPED | OUTPATIENT
Start: 2017-06-04 | End: 2018-06-09 | Stop reason: SDUPTHER

## 2017-06-09 ENCOUNTER — DOCUMENTATION ONLY (OUTPATIENT)
Dept: FAMILY MEDICINE | Facility: CLINIC | Age: 72
End: 2017-06-09

## 2017-06-09 NOTE — PROGRESS NOTES
Pre-Visit Chart Review  For Appointment Scheduled on 6/12/17    Health Maintenance Due   Topic Date Due    Eye Exam  09/04/1955

## 2017-06-12 ENCOUNTER — OFFICE VISIT (OUTPATIENT)
Dept: FAMILY MEDICINE | Facility: CLINIC | Age: 72
End: 2017-06-12
Payer: MEDICARE

## 2017-06-12 ENCOUNTER — APPOINTMENT (OUTPATIENT)
Dept: LAB | Facility: HOSPITAL | Age: 72
End: 2017-06-12
Attending: UROLOGY
Payer: MEDICARE

## 2017-06-12 ENCOUNTER — LAB VISIT (OUTPATIENT)
Dept: LAB | Facility: HOSPITAL | Age: 72
End: 2017-06-12
Attending: UROLOGY
Payer: MEDICARE

## 2017-06-12 ENCOUNTER — TELEPHONE (OUTPATIENT)
Dept: CARDIOLOGY | Facility: CLINIC | Age: 72
End: 2017-06-12

## 2017-06-12 VITALS
DIASTOLIC BLOOD PRESSURE: 74 MMHG | BODY MASS INDEX: 43.58 KG/M2 | HEART RATE: 51 BPM | HEIGHT: 70 IN | SYSTOLIC BLOOD PRESSURE: 123 MMHG | TEMPERATURE: 98 F | WEIGHT: 304.44 LBS

## 2017-06-12 DIAGNOSIS — C61 PROSTATE CANCER: ICD-10-CM

## 2017-06-12 DIAGNOSIS — M54.5 CHRONIC LOW BACK PAIN, UNSPECIFIED BACK PAIN LATERALITY, WITH SCIATICA PRESENCE UNSPECIFIED: Primary | ICD-10-CM

## 2017-06-12 DIAGNOSIS — G89.29 CHRONIC LOW BACK PAIN, UNSPECIFIED BACK PAIN LATERALITY, WITH SCIATICA PRESENCE UNSPECIFIED: ICD-10-CM

## 2017-06-12 DIAGNOSIS — R42 LIGHTHEADED: ICD-10-CM

## 2017-06-12 DIAGNOSIS — G89.29 CHRONIC LOW BACK PAIN, UNSPECIFIED BACK PAIN LATERALITY, WITH SCIATICA PRESENCE UNSPECIFIED: Primary | ICD-10-CM

## 2017-06-12 DIAGNOSIS — Z91.09 ENVIRONMENTAL ALLERGIES: ICD-10-CM

## 2017-06-12 DIAGNOSIS — M54.5 CHRONIC LOW BACK PAIN, UNSPECIFIED BACK PAIN LATERALITY, WITH SCIATICA PRESENCE UNSPECIFIED: ICD-10-CM

## 2017-06-12 DIAGNOSIS — T14.8XXA ABRASION: ICD-10-CM

## 2017-06-12 DIAGNOSIS — I15.2 HYPERTENSION ASSOCIATED WITH DIABETES: ICD-10-CM

## 2017-06-12 DIAGNOSIS — F41.1 GAD (GENERALIZED ANXIETY DISORDER): ICD-10-CM

## 2017-06-12 DIAGNOSIS — E11.59 HYPERTENSION ASSOCIATED WITH DIABETES: ICD-10-CM

## 2017-06-12 DIAGNOSIS — F41.9 ANXIETY: ICD-10-CM

## 2017-06-12 DIAGNOSIS — E11.40 TYPE 2 DIABETES MELLITUS WITH DIABETIC NEUROPATHY, WITHOUT LONG-TERM CURRENT USE OF INSULIN: ICD-10-CM

## 2017-06-12 LAB — COMPLEXED PSA SERPL-MCNC: <0.01 NG/ML

## 2017-06-12 PROCEDURE — 99214 OFFICE O/P EST MOD 30 MIN: CPT | Mod: S$GLB,,, | Performed by: NURSE PRACTITIONER

## 2017-06-12 PROCEDURE — 99999 PR PBB SHADOW E&M-EST. PATIENT-LVL V: CPT | Mod: PBBFAC,,, | Performed by: NURSE PRACTITIONER

## 2017-06-12 PROCEDURE — 3044F HG A1C LEVEL LT 7.0%: CPT | Mod: S$GLB,,, | Performed by: NURSE PRACTITIONER

## 2017-06-12 PROCEDURE — 1125F AMNT PAIN NOTED PAIN PRSNT: CPT | Mod: S$GLB,,, | Performed by: NURSE PRACTITIONER

## 2017-06-12 PROCEDURE — 4010F ACE/ARB THERAPY RXD/TAKEN: CPT | Mod: S$GLB,,, | Performed by: NURSE PRACTITIONER

## 2017-06-12 PROCEDURE — 84403 ASSAY OF TOTAL TESTOSTERONE: CPT

## 2017-06-12 PROCEDURE — 99499 UNLISTED E&M SERVICE: CPT | Mod: S$PBB,,, | Performed by: NURSE PRACTITIONER

## 2017-06-12 PROCEDURE — 1159F MED LIST DOCD IN RCRD: CPT | Mod: S$GLB,,, | Performed by: NURSE PRACTITIONER

## 2017-06-12 PROCEDURE — 84153 ASSAY OF PSA TOTAL: CPT

## 2017-06-12 PROCEDURE — 36415 COLL VENOUS BLD VENIPUNCTURE: CPT

## 2017-06-12 RX ORDER — ALPRAZOLAM 1 MG/1
1 TABLET ORAL NIGHTLY
Qty: 30 TABLET | Refills: 0 | Status: SHIPPED | OUTPATIENT
Start: 2017-06-12 | End: 2017-07-10 | Stop reason: SDUPTHER

## 2017-06-12 RX ORDER — LISINOPRIL 40 MG/1
40 TABLET ORAL NIGHTLY
COMMUNITY
Start: 2017-06-02 | End: 2018-05-12 | Stop reason: SDUPTHER

## 2017-06-12 RX ORDER — ESCITALOPRAM OXALATE 10 MG/1
TABLET ORAL
Qty: 30 TABLET | Refills: 11 | Status: SHIPPED | OUTPATIENT
Start: 2017-06-12 | End: 2018-06-09 | Stop reason: SDUPTHER

## 2017-06-12 RX ORDER — HYDROCODONE BITARTRATE AND ACETAMINOPHEN 7.5; 325 MG/1; MG/1
1 TABLET ORAL EVERY 6 HOURS PRN
Qty: 90 TABLET | Refills: 0 | Status: SHIPPED | OUTPATIENT
Start: 2017-06-12 | End: 2017-07-10 | Stop reason: SDUPTHER

## 2017-06-12 RX ORDER — MUPIROCIN 20 MG/G
OINTMENT TOPICAL 2 TIMES DAILY
Qty: 30 G | Refills: 1 | Status: SHIPPED | OUTPATIENT
Start: 2017-06-12 | End: 2018-09-28

## 2017-06-12 NOTE — PROGRESS NOTES
"Subjective:       Patient ID: Richard Bustos is a 71 y.o. male.    Chief Complaint: Medication Refill (med doc McDonald)    Mr. Bustos presents to the clinic today for medication documentation for Norco and Xanax.  He has been taking Norco for lower back pain which he reports is worsening over the past two months.  He recently saw Dr. Felipe who performed an MRI although he does not have the results.  He was referred to Dr. Causey as well for pain management and he is awaiting the results of the MRi before any intervention.  The patient reports he has been lightheaded in the mornings when waking up.  He reports his lisinopril was recently increased by Dr. Tate.  States he had one episode of lightheadedness where he had to go to his knees.  He reports severe anxiety for the past two months due to legal problems.  His daughter, who is present at the visit, states he is being audited by the [employer] and he has been having panic attacks.  He has been crying for two hours at a time.  He states when this happens "I try to clear my head".  He has taken "antidepressant" in the past for anxiety but weaned off of this after Em.        Review of Systems   Constitutional: Positive for fatigue. Negative for chills and fever.   HENT: Positive for rhinorrhea. Negative for congestion, ear discharge, ear pain and sinus pressure.    Eyes: Negative for visual disturbance.   Respiratory: Negative for cough, shortness of breath and wheezing.    Cardiovascular: Negative for chest pain, palpitations and leg swelling.   Gastrointestinal: Negative for abdominal pain, constipation and diarrhea.   Musculoskeletal: Positive for back pain. Negative for joint swelling.   Allergic/Immunologic: Positive for environmental allergies.   Neurological: Positive for light-headedness. Negative for syncope, facial asymmetry, speech difficulty and numbness.   Psychiatric/Behavioral: Positive for dysphoric mood. Negative for agitation. The patient is " nervous/anxious.        Objective:      Physical Exam   Constitutional: He is oriented to person, place, and time. He appears well-developed and well-nourished. No distress.   HENT:   Head: Normocephalic and atraumatic.   Right Ear: External ear normal.   Left Ear: External ear normal.   Mouth/Throat: Oropharynx is clear and moist. No oropharyngeal exudate.   Eyes: Pupils are equal, round, and reactive to light. Right eye exhibits no discharge. Left eye exhibits no discharge.   Neck: Neck supple. No thyromegaly present.   Cardiovascular: Normal rate and regular rhythm.  Exam reveals no gallop and no friction rub.    No murmur heard.  Pulmonary/Chest: Effort normal and breath sounds normal. No respiratory distress. He has no wheezes. He has no rales.   Musculoskeletal:   Ambulating with cane   Lymphadenopathy:     He has no cervical adenopathy.   Neurological: He is alert and oriented to person, place, and time. Coordination normal.   Skin: Skin is warm and dry. Abrasion (right flank) noted. No laceration noted.   Psychiatric: He has a normal mood and affect. His behavior is normal. Thought content normal.   Vitals reviewed.          Current Outpatient Prescriptions:     alendronate-vitamin D3 (FOSAMAX PLUS D) 70 mg- 2,800 unit per tablet, Take 1 tablet by mouth every 7 days., Disp: 4 tablet, Rfl: 6    allopurinol (ZYLOPRIM) 100 MG tablet, TAKE 1 TABLET(100 MG) BY MOUTH EVERY DAY, Disp: 90 tablet, Rfl: 3    atorvastatin (LIPITOR) 80 MG tablet, TAKE 1 TABLET(80 MG) BY MOUTH EVERY DAY, Disp: 90 tablet, Rfl: 3    calcitRIOL (ROCALTROL) 0.5 MCG Cap, , Disp: , Rfl:     carvedilol (COREG) 25 MG tablet, Take 1 tablet (25 mg total) by mouth 2 (two) times daily with meals., Disp: 180 tablet, Rfl: 3    ciclopirox (PENLAC) 8 % Soln, Apply topically nightly. (Patient taking differently: Apply topically daily as needed. ), Disp: 6.6 mL, Rfl: 11    ciclopirox 0.77 % Gel, Apply topically 2 (two) times daily., Disp: 100 g,  Rfl: 3    clopidogrel (PLAVIX) 75 mg tablet, Take 1 tablet (75 mg total) by mouth once daily., Disp: 90 tablet, Rfl: 11    clotrimazole-betamethasone 1-0.05% (LOTRISONE) cream, Apply topically 2 (two) times daily. To head of penis (Patient taking differently: Apply topically daily as needed. To head of penis), Disp: 1 Tube, Rfl: 2    ergocalciferol (ERGOCALCIFEROL) 50,000 unit Cap, TK 1 C PO Q WEEK, Disp: , Rfl: 0    ferrous sulfate 325 mg (65 mg iron) Tab tablet, TAKE 1 TABLET BY MOUTH TWICE DAILY, Disp: 180 tablet, Rfl: 3    fluticasone (FLONASE) 50 mcg/actuation nasal spray, 2 sprays by Each Nare route once daily., Disp: 1 Bottle, Rfl: 11    FOLIC ACID/MULTIVIT-MIN/LUTEIN (CENTRUM SILVER ORAL), Take 1 tablet by mouth once daily., Disp: , Rfl:     furosemide (LASIX) 40 MG tablet, TAKE 1 TABLET BY MOUTH DAILY AS NEEDED, Disp: 90 tablet, Rfl: 3    hydrocodone-acetaminophen 7.5-325mg (NORCO) 7.5-325 mg per tablet, Take 1 tablet by mouth every 6 (six) hours as needed for Pain., Disp: 90 tablet, Rfl: 0    L-METHYL-B6-B12 3-35-2 mg Tab, TAKE 1 TABLET BY MOUTH TWICE DAILY, Disp: 180 tablet, Rfl: 3    lisinopril (PRINIVIL,ZESTRIL) 40 MG tablet, Take 40 mg by mouth every evening. , Disp: , Rfl:     MAGOX 400 mg tablet, TAKE 1 TABLET(400 MG) BY MOUTH EVERY DAY, Disp: 90 tablet, Rfl: 3    mirabegron (MYRBETRIQ) 50 mg Tb24, Take 1 tablet (50 mg total) by mouth once daily., Disp: 30 tablet, Rfl: 11    nitroGLYCERIN 0.4 MG/DOSE TL SPRY (NITROLINGUAL) 400 mcg/spray spray, PLACE 1 SPRAY UNDER THE TONGUE EVERY 5 MINUTES AS NEEDED FOR CHEST PAIN, Disp: 4.9 g, Rfl: 3    omeprazole (PRILOSEC) 20 MG capsule, TAKE 1 CAPSULE(20 MG) BY MOUTH TWICE DAILY, Disp: 180 capsule, Rfl: 0    omeprazole (PRILOSEC) 20 MG capsule, Take 1 capsule (20 mg total) by mouth once daily., Disp: 30 capsule, Rfl: 3    ranitidine (ZANTAC) 150 MG tablet, TAKE 1 TABLET(150 MG) BY MOUTH TWICE DAILY, Disp: 180 tablet, Rfl: 3    salicylic acid  (SALACYN) 6 % Crea, Apply 1 application topically 2 (two) times daily. (Patient taking differently: Apply 1 application topically daily as needed. ), Disp: 454 g, Rfl: 11    vit C-vit E-lutein-min-om-3 (OCUVITE) 668-28-7-150 mg-unit-mg-mg Cap, Take 1 capsule by mouth once daily., Disp: , Rfl:     warfarin (COUMADIN) 5 MG tablet, TAKE 1 TABLET BY MOUTH EVERY DAY, Disp: 90 tablet, Rfl: 3    alprazolam (XANAX) 1 MG tablet, Take 1 tablet (1 mg total) by mouth every evening., Disp: 30 tablet, Rfl: 0    escitalopram oxalate (LEXAPRO) 10 MG tablet, Take 1/2 tablet (5 mg) daily for first 7 days.  Thereafter take one tablet (10 mg) daily., Disp: 30 tablet, Rfl: 11    mupirocin (BACTROBAN) 2 % ointment, Apply topically 2 (two) times daily., Disp: 30 g, Rfl: 1  Assessment:       1. Chronic low back pain, unspecified back pain laterality, with sciatica presence unspecified    2. GARY (generalized anxiety disorder)    3. Type 2 diabetes mellitus with diabetic neuropathy, without long-term current use of insulin    4. Abrasion    5. Hypertension associated with diabetes    6. Environmental allergies    7. Lightheaded        Plan:     Chronic low back pain, unspecified back pain laterality, with sciatica presence unspecified  Norco refill request sent to PCP.  Follow up Dr. Causey and Dr. Felipe.    GARY (generalized anxiety disorder)  Start Lexapro.    RTC 4 weeks.  -     escitalopram oxalate (LEXAPRO) 10 MG tablet; Take 1/2 tablet (5 mg) daily for first 7 days.  Thereafter take one tablet (10 mg) daily.  Dispense: 30 tablet; Refill: 11    Type 2 diabetes mellitus with diabetic neuropathy, without long-term current use of insulin  Stable on current medication. Needs A1c in 2 mos.    Abrasion  Wash with soap and water daily.    Notify provider if lesion grows in size.  -     mupirocin (BACTROBAN) 2 % ointment; Apply topically 2 (two) times daily.  Dispense: 30 g; Refill: 1    Hypertension associated with diabetes  Stable on current  medication.    Environmental allergies  Has been using Flonase inconsistently.  Advised to take Flonase daily.    Lightheaded  Likely due to recent increase of lisinopril.   Advised to check BP in the morning daily upon waking and follow up with Dr. Tate.  Will send message to Dr. Tate.    Patient readiness: acceptance and barriers:none    During the course of the visit the patient was educated and counseled about the following:     Diabetes:  Discussed general issues about diabetes pathophysiology and management.  Hypertension:   Medication: no change.    Goals: Diabetes: Maintain Hemoglobin A1C below 7 and Hypertension: Reduce Blood Pressure    Did patient meet goals/outcomes: Yes    The following self management tools provided: declined    Patient Instructions (the written plan) was given to the patient/family.     Time spent with patient: 30 minutes

## 2017-06-12 NOTE — TELEPHONE ENCOUNTER
Patient stated to call his Daughter Citlali.  Spoke to Citlali gave information that patient has an appointment with Dr albarran 9/08/17 at 140 pm  Ms Citlali stated that she understands and agrees date time and location

## 2017-06-12 NOTE — TELEPHONE ENCOUNTER
----- Message from Faimlia Tate MD sent at 6/12/2017  2:03 PM CDT -----  Regarding: RE: lightheadedness  Perfect, thanks     Dr. Tate    ----- Message -----  From: RICK Lewis-C  Sent: 6/12/2017  10:37 AM  To: Familia Tate MD  Subject: lightheadedness                                  Hi Dr. Tate,  I saw Mr. Bustos today and he reports he has been lightheaded since lisinopril was increased.  He said he had one episode of presyncope and had to go to his knees.  I asked him to follow up with you on this.  BP today was 123/74.  I asked him to keep a BP log, checking the BP in the morning when he wakes up which is when he feels lightheaded.  -Marina Cardenas

## 2017-06-13 LAB — TESTOST SERPL-MCNC: 13 NG/DL

## 2017-06-14 ENCOUNTER — TELEPHONE (OUTPATIENT)
Dept: PAIN MEDICINE | Facility: CLINIC | Age: 72
End: 2017-06-14

## 2017-06-14 ENCOUNTER — TELEPHONE (OUTPATIENT)
Dept: FAMILY MEDICINE | Facility: CLINIC | Age: 72
End: 2017-06-14

## 2017-06-14 DIAGNOSIS — M47.816 LUMBAR FACET ARTHROPATHY: Primary | ICD-10-CM

## 2017-06-14 NOTE — TELEPHONE ENCOUNTER
----- Message from Carrie Love sent at 6/14/2017 10:02 AM CDT -----  Citlali (Daughter) is calling for patient's tests results.Please call back at [phone] to advise.  Thanks!

## 2017-06-14 NOTE — TELEPHONE ENCOUNTER
----- Message from Carrie Love sent at 6/14/2017 10:02 AM CDT -----  Citlali (Daughter) is calling for patient's tests results.Please call back at [phone] to advise.  Thanks!

## 2017-06-14 NOTE — TELEPHONE ENCOUNTER
Patient needs clearance to hold his prescribed coumadin 5 days prior and plavix 7 days prior to procedure. Pt is scheduled to have a radiofrequency ablation pending outcome of test procedure on 6/22/17.

## 2017-06-14 NOTE — TELEPHONE ENCOUNTER
Patient ok to hold warfarin 4 days prior, will need to get clearance to hold plavix from Dr. Tate's office. I will copy his office on this message.

## 2017-06-15 ENCOUNTER — TELEPHONE (OUTPATIENT)
Dept: FAMILY MEDICINE | Facility: CLINIC | Age: 72
End: 2017-06-15

## 2017-06-15 NOTE — TELEPHONE ENCOUNTER
----- Message from Elsa Lyon sent at 6/14/2017 11:26 AM CDT -----  Please call patient daughter in regards to a letter, she states office knows, [phone] Citlali

## 2017-06-16 ENCOUNTER — TELEPHONE (OUTPATIENT)
Dept: FAMILY MEDICINE | Facility: CLINIC | Age: 72
End: 2017-06-16

## 2017-06-16 NOTE — TELEPHONE ENCOUNTER
Left message with patient's daughter that Marina Cardenas typed a letter ( for [employer] ) for him.  Ready for .

## 2017-06-16 NOTE — TELEPHONE ENCOUNTER
----- Message from Belkis Nuñez sent at 6/16/2017  9:07 AM CDT -----  Contact: Daughter - Citlali  Please call patient's daughter in reference to a letter that was requested from Dr Ramirez. Daughter states that she spoke with Marina yesterday.     Please call daughter back at [phone].     Thank you.

## 2017-06-19 ENCOUNTER — TELEPHONE (OUTPATIENT)
Dept: PAIN MEDICINE | Facility: CLINIC | Age: 72
End: 2017-06-19

## 2017-06-19 NOTE — TELEPHONE ENCOUNTER
----- Message from Carrie Delgado sent at 6/19/2017  9:54 AM CDT -----  Contact: Daughter,Citlali Godwin wants to speak with a nurse regarding appointment on June 22 please call back at [phone]

## 2017-06-19 NOTE — TELEPHONE ENCOUNTER
Pt daughter had multiple questions regarding procedure. She was upset that she was not notified. Gave her verbal instructions

## 2017-06-22 ENCOUNTER — SURGERY (OUTPATIENT)
Age: 72
End: 2017-06-22

## 2017-06-22 ENCOUNTER — HOSPITAL ENCOUNTER (OUTPATIENT)
Facility: AMBULARY SURGERY CENTER | Age: 72
Discharge: HOME | End: 2017-06-22
Attending: ANESTHESIOLOGY | Admitting: ANESTHESIOLOGY
Payer: MEDICARE

## 2017-06-22 DIAGNOSIS — M47.819 FACET ARTHROPATHY: Primary | ICD-10-CM

## 2017-06-22 PROCEDURE — 64494 INJ PARAVERT F JNT L/S 2 LEV: CPT | Mod: 50,,, | Performed by: ANESTHESIOLOGY

## 2017-06-22 PROCEDURE — 64494 INJ PARAVERT F JNT L/S 2 LEV: CPT | Mod: LT | Performed by: ANESTHESIOLOGY

## 2017-06-22 PROCEDURE — 64495 INJ PARAVERT F JNT L/S 3 LEV: CPT | Mod: RT | Performed by: ANESTHESIOLOGY

## 2017-06-22 PROCEDURE — 64495 INJ PARAVERT F JNT L/S 3 LEV: CPT | Mod: 50,,, | Performed by: ANESTHESIOLOGY

## 2017-06-22 PROCEDURE — 64493 INJ PARAVERT F JNT L/S 1 LEV: CPT | Mod: RT | Performed by: ANESTHESIOLOGY

## 2017-06-22 PROCEDURE — 64493 INJ PARAVERT F JNT L/S 1 LEV: CPT | Mod: 50,,, | Performed by: ANESTHESIOLOGY

## 2017-06-22 PROCEDURE — 99152 MOD SED SAME PHYS/QHP 5/>YRS: CPT | Mod: ,,, | Performed by: ANESTHESIOLOGY

## 2017-06-22 RX ORDER — LIDOCAINE HYDROCHLORIDE 10 MG/ML
INJECTION, SOLUTION EPIDURAL; INFILTRATION; INTRACAUDAL; PERINEURAL
Status: DISCONTINUED
Start: 2017-06-22 | End: 2017-06-22 | Stop reason: HOSPADM

## 2017-06-22 RX ORDER — BUPIVACAINE HYDROCHLORIDE 5 MG/ML
INJECTION, SOLUTION EPIDURAL; INTRACAUDAL
Status: DISCONTINUED
Start: 2017-06-22 | End: 2017-06-22 | Stop reason: HOSPADM

## 2017-06-22 RX ORDER — MIDAZOLAM HYDROCHLORIDE 2 MG/2ML
INJECTION, SOLUTION INTRAMUSCULAR; INTRAVENOUS
Status: DISCONTINUED | OUTPATIENT
Start: 2017-06-22 | End: 2017-06-22 | Stop reason: HOSPADM

## 2017-06-22 RX ORDER — MIDAZOLAM HYDROCHLORIDE 1 MG/ML
INJECTION INTRAMUSCULAR; INTRAVENOUS
Status: DISCONTINUED
Start: 2017-06-22 | End: 2017-06-22 | Stop reason: HOSPADM

## 2017-06-22 RX ORDER — BUPIVACAINE HYDROCHLORIDE 5 MG/ML
INJECTION, SOLUTION EPIDURAL; INTRACAUDAL
Status: DISCONTINUED | OUTPATIENT
Start: 2017-06-22 | End: 2017-06-22 | Stop reason: HOSPADM

## 2017-06-22 RX ORDER — ALPRAZOLAM 1 MG/1
1 TABLET, ORALLY DISINTEGRATING ORAL
Status: DISCONTINUED | OUTPATIENT
Start: 2017-06-22 | End: 2017-06-22 | Stop reason: HOSPADM

## 2017-06-22 RX ORDER — SODIUM CHLORIDE, SODIUM LACTATE, POTASSIUM CHLORIDE, CALCIUM CHLORIDE 600; 310; 30; 20 MG/100ML; MG/100ML; MG/100ML; MG/100ML
INJECTION, SOLUTION INTRAVENOUS ONCE AS NEEDED
Status: COMPLETED | OUTPATIENT
Start: 2017-06-22 | End: 2017-06-22

## 2017-06-22 RX ORDER — ASPIRIN 81 MG/1
81 TABLET ORAL DAILY
COMMUNITY
End: 2019-01-25

## 2017-06-22 RX ADMIN — BUPIVACAINE HYDROCHLORIDE 5 ML: 5 INJECTION, SOLUTION EPIDURAL; INTRACAUDAL at 02:06

## 2017-06-22 RX ADMIN — MIDAZOLAM HYDROCHLORIDE 1 MG: 2 INJECTION, SOLUTION INTRAMUSCULAR; INTRAVENOUS at 02:06

## 2017-06-22 RX ADMIN — SODIUM CHLORIDE, SODIUM LACTATE, POTASSIUM CHLORIDE, CALCIUM CHLORIDE: 600; 310; 30; 20 INJECTION, SOLUTION INTRAVENOUS at 01:06

## 2017-06-22 NOTE — OP NOTE
PROCEDURE DATE: 6/22/2017    PROCEDURE:  Bilateral L3,4,5 medial branch nerve blocks    DIAGNOSIS:  Lumbar spondylosis without myelopathy    Post Op diagnosis: Same    PHYSICIAN: Nile Causey MD    MEDICATIONS INJECTED: 0.5% bupivicaine, 0.5 ml at each level    LOCAL ANESTHETIC USED: 1% lidocaine 1ml at each site    SEDATION MEDICATIONS:RN IV Versed    ESTIMATED BLOOD LOSS:  None    COMPLICATIONS:  None    TECHNIQUE: A time out was taken to identify the patient, procedure and side of the procedure. The patient was placed in a prone position, then prepped and draped in the usual sterile fashion using ChloraPrep and sterile towels.  The levels were determined under fluoroscopic guidance and then marked.  A 25-gauge 5 inch needle was introduced to the anatomic location of the L3,4,5 medial branch nerves on the bilateral side. The above medication was then injected. The patient tolerated the procedure well.     The patient was monitored after the procedure. Patient was given pain diary to record pain levels at home.     If found to have greater than a 50% recovery and so will be scheduled for a radiofrequency ablation of the corresponding nerves.  Patient was given post procedure and discharge instructions to follow at home.  The patient was discharged in a stable condition.

## 2017-06-22 NOTE — DISCHARGE INSTRUCTIONS
Nerve Block  This was a test, not a treatment. Your pain may return.  Keep your pain journal Dr office will call to check your pain levels within 3 days    Home care instructions  You may apply ice pack to the injection site for 2 days , NO HEAT for 2 days  You may wet your shower but no soaking above level of injection in tub or pool   Resume Aspirin, Plavix, or Coumadin the day after the procedure unless otherwise instructed.  Gradually increase activity  Do not drive for 24 hr  If diabetic monitor your glucose carefully. Follow up with your primary MD if this is a problem    SEEK  IMMEDIATE MEDICAL HELP FOR:  Severe increase in your usual pain or appearance of new pain  Prolonged or increasing weakness or numbness in the legs or arms  Drainage, redness, active bleeding, or increased swelling at the injection site  Temperature over 100.0 degrees F.  Headache that increases when your head is upright and decreases when you lie flat  Shortness of breath, chest pain, or problems breathing

## 2017-06-22 NOTE — DISCHARGE SUMMARY
[facility]  Discharge Note  Short Stay    Admit Date: 6/22/2017    Discharge Date and Time: 6/22/2017    Attending Physician: Nile Causey MD     Discharge Provider: Nile Causey    Diagnoses:  Active Hospital Problems    Diagnosis  POA    *Facet arthropathy [M12.88]  Yes      Resolved Hospital Problems    Diagnosis Date Resolved POA   No resolved problems to display.       Hospital Course: Lumbar MBB  Discharged Condition: Good    Final Diagnoses:   Active Hospital Problems    Diagnosis  POA    *Facet arthropathy [M12.88]  Yes      Resolved Hospital Problems    Diagnosis Date Resolved POA   No resolved problems to display.       Disposition: Home or Self Care    Follow up/Patient Instructions:    Medications:  Reconciled Home Medications:   Current Discharge Medication List      CONTINUE these medications which have NOT CHANGED    Details   aspirin (ECOTRIN) 81 MG EC tablet Take 81 mg by mouth once daily.      calcitRIOL (ROCALTROL) 0.5 MCG Cap       carvedilol (COREG) 25 MG tablet Take 1 tablet (25 mg total) by mouth 2 (two) times daily with meals.  Qty: 180 tablet, Refills: 3    Comments: **Patient requests 90 days supply**  Associated Diagnoses: Essential hypertension      clopidogrel (PLAVIX) 75 mg tablet Take 1 tablet (75 mg total) by mouth once daily.  Qty: 90 tablet, Refills: 11      FOLIC ACID/MULTIVIT-MIN/LUTEIN (CENTRUM SILVER ORAL) Take 1 tablet by mouth once daily.      hydrocodone-acetaminophen 7.5-325mg (NORCO) 7.5-325 mg per tablet Take 1 tablet by mouth every 6 (six) hours as needed for Pain.  Qty: 90 tablet, Refills: 0    Associated Diagnoses: Chronic low back pain, unspecified back pain laterality, with sciatica presence unspecified      L-METHYL-B6-B12 3-35-2 mg Tab TAKE 1 TABLET BY MOUTH TWICE DAILY  Qty: 180 tablet, Refills: 3      MAGOX 400 mg tablet TAKE 1 TABLET(400 MG) BY MOUTH EVERY DAY  Qty: 90 tablet, Refills: 3    Associated Diagnoses: Essential hypertension      !! omeprazole  (PRILOSEC) 20 MG capsule TAKE 1 CAPSULE(20 MG) BY MOUTH TWICE DAILY  Qty: 180 capsule, Refills: 0    Associated Diagnoses: Gastroesophageal reflux disease, esophagitis presence not specified      ranitidine (ZANTAC) 150 MG tablet TAKE 1 TABLET(150 MG) BY MOUTH TWICE DAILY  Qty: 180 tablet, Refills: 3    Associated Diagnoses: Gastroesophageal reflux disease, esophagitis presence not specified      vit C-vit E-lutein-min-om-3 (OCUVITE) 906-13-9-150 mg-unit-mg-mg Cap Take 1 capsule by mouth once daily.      alendronate-vitamin D3 (FOSAMAX PLUS D) 70 mg- 2,800 unit per tablet Take 1 tablet by mouth every 7 days.  Qty: 4 tablet, Refills: 6      allopurinol (ZYLOPRIM) 100 MG tablet TAKE 1 TABLET(100 MG) BY MOUTH EVERY DAY  Qty: 90 tablet, Refills: 3    Associated Diagnoses: Acute gout of foot, unspecified cause, unspecified laterality      alprazolam (XANAX) 1 MG tablet Take 1 tablet (1 mg total) by mouth every evening.  Qty: 30 tablet, Refills: 0    Associated Diagnoses: Anxiety      atorvastatin (LIPITOR) 80 MG tablet TAKE 1 TABLET(80 MG) BY MOUTH EVERY DAY  Qty: 90 tablet, Refills: 3    Associated Diagnoses: Hyperlipidemia      ciclopirox (PENLAC) 8 % Soln Apply topically nightly.  Qty: 6.6 mL, Refills: 11      ciclopirox 0.77 % Gel Apply topically 2 (two) times daily.  Qty: 100 g, Refills: 3      clotrimazole-betamethasone 1-0.05% (LOTRISONE) cream Apply topically 2 (two) times daily. To head of penis  Qty: 1 Tube, Refills: 2      ergocalciferol (ERGOCALCIFEROL) 50,000 unit Cap TK 1 C PO Q WEEK  Refills: 0      escitalopram oxalate (LEXAPRO) 10 MG tablet Take 1/2 tablet (5 mg) daily for first 7 days.  Thereafter take one tablet (10 mg) daily.  Qty: 30 tablet, Refills: 11      ferrous sulfate 325 mg (65 mg iron) Tab tablet TAKE 1 TABLET BY MOUTH TWICE DAILY  Qty: 180 tablet, Refills: 3    Associated Diagnoses: Iron deficiency anemia      fluticasone (FLONASE) 50 mcg/actuation nasal spray 2 sprays by Each Nare route  once daily.  Qty: 1 Bottle, Refills: 11    Associated Diagnoses: Chronic sinus complaints      furosemide (LASIX) 40 MG tablet TAKE 1 TABLET BY MOUTH DAILY AS NEEDED  Qty: 90 tablet, Refills: 3    Associated Diagnoses: Coronary artery disease involving native coronary artery of native heart without angina pectoris      lisinopril (PRINIVIL,ZESTRIL) 40 MG tablet Take 40 mg by mouth every evening.       mirabegron (MYRBETRIQ) 50 mg Tb24 Take 1 tablet (50 mg total) by mouth once daily.  Qty: 30 tablet, Refills: 11      mupirocin (BACTROBAN) 2 % ointment Apply topically 2 (two) times daily.  Qty: 30 g, Refills: 1    Associated Diagnoses: Abrasion      nitroGLYCERIN 0.4 MG/DOSE TL SPRY (NITROLINGUAL) 400 mcg/spray spray PLACE 1 SPRAY UNDER THE TONGUE EVERY 5 MINUTES AS NEEDED FOR CHEST PAIN  Qty: 4.9 g, Refills: 3      !! omeprazole (PRILOSEC) 20 MG capsule Take 1 capsule (20 mg total) by mouth once daily.  Qty: 30 capsule, Refills: 3      salicylic acid (SALACYN) 6 % Crea Apply 1 application topically 2 (two) times daily.  Qty: 454 g, Refills: 11      warfarin (COUMADIN) 5 MG tablet TAKE 1 TABLET BY MOUTH EVERY DAY  Qty: 90 tablet, Refills: 3    Associated Diagnoses: Paroxysmal atrial fibrillation; MTHFR mutation (methylenetetrahydrofolate reductase)       !! - Potential duplicate medications found. Please discuss with provider.          Discharge Procedure Orders  Diet general     Activity as tolerated     Call MD for:  temperature >100.4     Call MD for:  persistent nausea and vomiting or diarrhea     Call MD for:  severe uncontrolled pain     Call MD for:  redness, tenderness, or signs of infection (pain, swelling, redness, odor or green/yellow discharge around incision site)     Call MD for:  difficulty breathing or increased cough     Call MD for:  severe persistent headache          Follow up with MD in 2-3 weeks    Discharge Procedure Orders (must include Diet, Follow-up, Activity):    Discharge Procedure Orders  (must include Diet, Follow-up, Activity)  Diet general     Activity as tolerated     Call MD for:  temperature >100.4     Call MD for:  persistent nausea and vomiting or diarrhea     Call MD for:  severe uncontrolled pain     Call MD for:  redness, tenderness, or signs of infection (pain, swelling, redness, odor or green/yellow discharge around incision site)     Call MD for:  difficulty breathing or increased cough     Call MD for:  severe persistent headache

## 2017-06-22 NOTE — H&P (VIEW-ONLY)
This note was completed with dictation software and grammatical errors may exist.    Referring Physician: Erick Delaney,*    PCP: Familia Ramirez Jr, MD      CC: Lower back pain    HPI:   Richard Butsos is a 71 y.o. male referred to us for lower back pain.  Pain has gradually worsened over past 2 months.  No recent traumatic incident.  His intermittent aching, burning, throbbing pain in his lower back.  Pain occasionally travels down his posterior thighs into his posterior calves.  Pain worsens standing, walking, lifting and getting up.  Pain improves with rest.  He does have history of bilateral hip replacements and has been evaluated by orthopedics.  He was told that his pain may be due to his lumbar spine.  He has tried physical therapy with minimal benefit.  He recently had updated lumbar MRI.  He denies any weakness.  No bowel bladder changes.  Lower back pain is more bothersome.  He rates his pain 6/10 today.    ROS:  CONSTITUTIONAL: No fevers, chills, night sweats, wt. loss, appetite changes  SKIN: no rashes or itching  ENT: No headaches, head trauma, vision changes, or eye pain  LYMPH NODES: None noticed   CV: No chest pain, palpitations.   RESP: No shortness of breath, dyspnea on exertion, cough, wheezing, or hemoptysis  GI: No nausea, emesis, diarrhea, constipation, melena, hematochezia, pain.    : No dysuria, hematuria, urgency, or frequency   HEME: No easy bruising, bleeding problems  PSYCHIATRIC: No depression, anxiety, psychosis, hallucinations.  NEURO: No seizures, memory loss, dizziness or difficulty sleeping  MSK: + History of present illness      Past Medical History:   Diagnosis Date    Anemia     Anticoagulant long-term use     Aorta aneurysm     Arthritis     Atrial fibrillation     CAD (coronary artery disease)     CHF (congestive heart failure)     Chronic kidney disease     Colon polyp     Diabetes mellitus     Diabetes mellitus type II     Diverticulosis     Elevated  "PSA     Encounter for blood transfusion     Former smoker     GERD (gastroesophageal reflux disease)     Gout     Hx of colonic polyps     Hypercoagulable state     Hyperlipidemia     Hypertension     MI, old 2010    Myocardial infarction     9/2010    Prostate cancer 06/2016    Sleep apnea     Venous stasis     Chronic     Past Surgical History:   Procedure Laterality Date    ABDOMINAL SURGERY      CARDIAC SURGERY      COLONOSCOPY  02/2011    diverticulosis with diverticula with clot, no active bleeding    COLONOSCOPY N/A 8/24/2016    Procedure: COLONOSCOPY;  Surgeon: Saroj Borjas MD;  Location: [facility];  Service: Endoscopy;  Laterality: N/A;    GASTRIC BYPASS  2/5/2008    IVC FILTER RETRIEVAL      JOINT REPLACEMENT  1996 and 2001    bi-lat hip replacement/Rt Hip and Lt Hip    ROTATOR CUFF REPAIR      Rt shoulder    Stents  8/18/2010    x 3    UPPER GASTROINTESTINAL ENDOSCOPY  02/2011     Family History   Problem Relation Age of Onset    Heart attack Mother     Heart attack Father     Heart attack Brother     Ulcerative colitis Daughter 35    Colon cancer Neg Hx     Colon polyps Neg Hx     Crohn's disease Neg Hx      Social History     Social History    Marital status:      Spouse name: N/A    Number of children: N/A    Years of education: N/A     Social History Main Topics    Smoking status: Former Smoker    Smokeless tobacco: Never Used      Comment: 47 yrs ago, only smoked 3-4 packs in his entire life as a teenager    Alcohol use Yes      Comment: rare    Drug use: No    Sexual activity: Not Asked     Other Topics Concern    None     Social History Narrative    None         Medications/Allergies: See med card    Vitals:    05/31/17 1034   BP: 128/79   Pulse: (!) 54   Weight: (!) 138.8 kg (306 lb)   Height: 5' 10" (1.778 m)   PainSc:   6         Physical exam:    GENERAL: A and O x3, the patient appears well groomed and is in no acute distress.  Skin: No " rashes or obvious lesions  HEENT: normocephalic, atraumatic  CARDIOVASCULAR:  Palpable peripheral pulses  LUNGS: easy work of breathing  ABDOMEN: soft, nontender   UPPER EXTREMITIES: Normal alignment, normal range of motion, no atrophy, no skin changes,  hair growth and nail growth normal and equal bilaterally. No swelling, no tenderness.    LOWER EXTREMITIES:  Normal alignment, normal range of motion, no atrophy, no skin changes,  hair growth and nail growth normal and equal bilaterally. No swelling, no tenderness.    LUMBAR SPINE  Lumbar spine: ROM is limited with flexion extension and oblique extension with moderate increased pain.    Parviz's test causes no increased pain on either side.    Supine straight leg raise is negative bilaterally.    Internal and external rotation of the hip causes no increased pain on either side.  Myofascial exam: No tenderness to palpation across lumbar paraspinous muscles.      MENTAL STATUS: normal orientation, speech, language, and fund of knowledge for social situation.  Emotional state appropriate.    CRANIAL NERVES:  II:  PERRL bilaterally,   III,IV,VI: EOMI.    V:  Facial sensation equal bilaterally  VII:  Facial motor function normal.  VIII:  Hearing equal to finger rub bilaterally  IX/X: Gag normal, palate symmetric  XI:  Shoulder shrug equal, head turn equal  XII:  Tongue midline without fasciculations      MOTOR: Tone and bulk: normal bilateral upper and lower Strength: normal   Delt Bi Tri WE WF     R 5 5 5 5 5 5   L 5 5 5 5 5 5     IP ADD ABD Quad TA Gas HAM  R 5 5 5 5 5 5 5  L 5 5 5 5 5 5 5    SENSATION: Light touch and pinprick intact bilaterally  REFLEXES: normal, symmetric, nonbrisk.  Toes down, no clonus. No hoffmans.  GAIT: normal rise, base, steps, and arm swing.        Imaging:  Requesting    Assessment:  Patient referred for lower back pain  1. Facet arthropathy    2. DDD (degenerative disc disease), lumbar          Plan:  - I have stressed the importance  of physical activity and exercise to improve overall health  - Request past imaging  - I believe his low back pain maybe due to facet arthropathy and have recommended lumbar medial branch blocks as a diagnostic procedure.  If successful, would proceed with radiofrequency ablation.  - Follow up after procedure      Thank you for referring this interesting patient, and I look forward to continuing to collaborate in his care.

## 2017-06-23 VITALS
SYSTOLIC BLOOD PRESSURE: 114 MMHG | TEMPERATURE: 98 F | DIASTOLIC BLOOD PRESSURE: 65 MMHG | OXYGEN SATURATION: 98 % | HEART RATE: 58 BPM | WEIGHT: 304.44 LBS | HEIGHT: 70 IN | RESPIRATION RATE: 18 BRPM | BODY MASS INDEX: 43.58 KG/M2

## 2017-06-26 ENCOUNTER — TELEPHONE (OUTPATIENT)
Dept: PAIN MEDICINE | Facility: CLINIC | Age: 72
End: 2017-06-26

## 2017-06-26 NOTE — TELEPHONE ENCOUNTER
Patient reports 50% or greater decrease in pain. Patient schedule for second block on 7/13/17. If successful will schedule for RFA. Instructions given and voiced understanding.

## 2017-06-26 NOTE — TELEPHONE ENCOUNTER
----- Message from Alexsandra Swan sent at 6/23/2017  4:26 PM CDT -----  Contact: Daughter Citlali [phone]  Call placed to pod. Patient's daughter returned your call, please call back.  Thank you!

## 2017-06-27 ENCOUNTER — PATIENT MESSAGE (OUTPATIENT)
Dept: UROLOGY | Facility: CLINIC | Age: 72
End: 2017-06-27

## 2017-06-28 ENCOUNTER — TELEPHONE (OUTPATIENT)
Dept: PAIN MEDICINE | Facility: CLINIC | Age: 72
End: 2017-06-28

## 2017-06-28 ENCOUNTER — TELEPHONE (OUTPATIENT)
Dept: UROLOGY | Facility: CLINIC | Age: 72
End: 2017-06-28

## 2017-06-28 ENCOUNTER — ANTI-COAG VISIT (OUTPATIENT)
Dept: CARDIOLOGY | Facility: CLINIC | Age: 72
End: 2017-06-28

## 2017-06-28 DIAGNOSIS — Z79.01 LONG TERM (CURRENT) USE OF ANTICOAGULANTS: ICD-10-CM

## 2017-06-28 DIAGNOSIS — D68.59 HYPERCOAGULABLE STATE: ICD-10-CM

## 2017-06-28 LAB
INR PPP: 1.9
INR PPP: 1.9
PROTHROMBIN TIME: 19.6 SEC (ref 9–11.5)

## 2017-06-28 NOTE — TELEPHONE ENCOUNTER
Pt sister called and asked if he can have a testosterone injection the day of procedure. She was informed this will be ok.

## 2017-06-28 NOTE — TELEPHONE ENCOUNTER
----- Message from Whit Garcia sent at 6/28/2017  9:52 AM CDT -----  Contact: daughter Citlali   Patient's daughter Citlali would like to speak to a nurse regarding his appointment on 7/13/17  Please call her at     Thanks

## 2017-06-28 NOTE — TELEPHONE ENCOUNTER
Spoke with patient's daughter she states patient has surgery on 7/13 the same date of appointment with , appointment rescheduled for 7/11 at 11:30am. Daughter verbally voiced understanding.

## 2017-07-05 DIAGNOSIS — M47.816 LUMBAR FACET ARTHROPATHY: Primary | ICD-10-CM

## 2017-07-07 ENCOUNTER — DOCUMENTATION ONLY (OUTPATIENT)
Dept: FAMILY MEDICINE | Facility: CLINIC | Age: 72
End: 2017-07-07

## 2017-07-07 NOTE — PROGRESS NOTES
Pre-Visit Chart Review  For Appointment Scheduled on 7/10/17    Health Maintenance Due   Topic Date Due    Eye Exam  09/04/1955

## 2017-07-10 ENCOUNTER — OFFICE VISIT (OUTPATIENT)
Dept: FAMILY MEDICINE | Facility: CLINIC | Age: 72
End: 2017-07-10
Payer: MEDICARE

## 2017-07-10 VITALS
TEMPERATURE: 98 F | HEIGHT: 70 IN | WEIGHT: 300.94 LBS | HEART RATE: 60 BPM | SYSTOLIC BLOOD PRESSURE: 112 MMHG | BODY MASS INDEX: 43.08 KG/M2 | DIASTOLIC BLOOD PRESSURE: 60 MMHG

## 2017-07-10 DIAGNOSIS — I15.2 HYPERTENSION ASSOCIATED WITH DIABETES: ICD-10-CM

## 2017-07-10 DIAGNOSIS — F41.1 GAD (GENERALIZED ANXIETY DISORDER): Primary | ICD-10-CM

## 2017-07-10 DIAGNOSIS — G89.29 CHRONIC LOW BACK PAIN, UNSPECIFIED BACK PAIN LATERALITY, WITH SCIATICA PRESENCE UNSPECIFIED: ICD-10-CM

## 2017-07-10 DIAGNOSIS — F41.9 ANXIETY: ICD-10-CM

## 2017-07-10 DIAGNOSIS — E11.40 TYPE 2 DIABETES MELLITUS WITH DIABETIC NEUROPATHY, WITHOUT LONG-TERM CURRENT USE OF INSULIN: ICD-10-CM

## 2017-07-10 DIAGNOSIS — E66.01 MORBID OBESITY DUE TO EXCESS CALORIES: ICD-10-CM

## 2017-07-10 DIAGNOSIS — M54.5 CHRONIC LOW BACK PAIN, UNSPECIFIED BACK PAIN LATERALITY, WITH SCIATICA PRESENCE UNSPECIFIED: ICD-10-CM

## 2017-07-10 DIAGNOSIS — E11.59 HYPERTENSION ASSOCIATED WITH DIABETES: ICD-10-CM

## 2017-07-10 PROCEDURE — 99499 UNLISTED E&M SERVICE: CPT | Mod: S$PBB,,, | Performed by: NURSE PRACTITIONER

## 2017-07-10 PROCEDURE — 99999 PR PBB SHADOW E&M-EST. PATIENT-LVL V: CPT | Mod: PBBFAC,,, | Performed by: NURSE PRACTITIONER

## 2017-07-10 PROCEDURE — 1159F MED LIST DOCD IN RCRD: CPT | Mod: S$GLB,,, | Performed by: NURSE PRACTITIONER

## 2017-07-10 PROCEDURE — 1125F AMNT PAIN NOTED PAIN PRSNT: CPT | Mod: S$GLB,,, | Performed by: NURSE PRACTITIONER

## 2017-07-10 PROCEDURE — 3044F HG A1C LEVEL LT 7.0%: CPT | Mod: S$GLB,,, | Performed by: NURSE PRACTITIONER

## 2017-07-10 PROCEDURE — 4010F ACE/ARB THERAPY RXD/TAKEN: CPT | Mod: S$GLB,,, | Performed by: NURSE PRACTITIONER

## 2017-07-10 PROCEDURE — 99213 OFFICE O/P EST LOW 20 MIN: CPT | Mod: S$GLB,,, | Performed by: NURSE PRACTITIONER

## 2017-07-10 RX ORDER — ALPRAZOLAM 1 MG/1
1 TABLET ORAL NIGHTLY
Qty: 30 TABLET | Refills: 0 | Status: SHIPPED | OUTPATIENT
Start: 2017-07-12 | End: 2017-08-11

## 2017-07-10 RX ORDER — HYDROCODONE BITARTRATE AND ACETAMINOPHEN 7.5; 325 MG/1; MG/1
1 TABLET ORAL EVERY 6 HOURS PRN
Qty: 90 TABLET | Refills: 0 | Status: SHIPPED | OUTPATIENT
Start: 2017-07-12 | End: 2017-09-13 | Stop reason: SDUPTHER

## 2017-07-10 NOTE — PROGRESS NOTES
Subjective:       Patient ID: Richard Bustos is a 71 y.o. male.    Chief Complaint: Anxiety (month f/u Lexapro started 6/12)    Mr. Bustos presents to the clinic today for one month follow up for anxiety.  He has been going through legal/financial difficulties which has increased his anxiety.  He started Lexapro one month ago and states it is helping. He does endorse some lightheadedness/dizziness this morning which is new.  He takes the medication in the morning.       Review of Systems   Constitutional: Negative for chills and fever.   Respiratory: Negative for cough and shortness of breath.    Cardiovascular: Negative for chest pain and leg swelling.   Musculoskeletal: Positive for back pain (chronic). Negative for joint swelling.   Neurological: Positive for weakness (legs, chronic) and light-headedness. Negative for tremors, syncope, facial asymmetry and numbness.   Psychiatric/Behavioral: Negative for self-injury and suicidal ideas. The patient is nervous/anxious (improved).        Objective:      Physical Exam   Constitutional: He is oriented to person, place, and time. He appears well-developed and well-nourished. No distress.   HENT:   Head: Normocephalic and atraumatic.   Right Ear: External ear normal.   Left Ear: External ear normal.   Mouth/Throat: Oropharynx is clear and moist. No oropharyngeal exudate.   Eyes: Pupils are equal, round, and reactive to light. Right eye exhibits no discharge. Left eye exhibits no discharge.   Neck: Neck supple. No thyromegaly present.   Cardiovascular: Normal rate and regular rhythm.  Exam reveals no gallop and no friction rub.    No murmur heard.  No lower extremity edema bilaterally     Pulmonary/Chest: Effort normal and breath sounds normal. No respiratory distress. He has no wheezes. He has no rales.   Abdominal: Soft. He exhibits no distension. There is no tenderness.   Lymphadenopathy:     He has no cervical adenopathy.   Neurological: He is alert and oriented to  person, place, and time. Coordination normal.   Skin: Skin is warm and dry.   Psychiatric: His behavior is normal. Thought content normal. He exhibits a depressed mood.   Quiet speech   Vitals reviewed.          Current Outpatient Prescriptions:     alendronate-vitamin D3 (FOSAMAX PLUS D) 70 mg- 2,800 unit per tablet, Take 1 tablet by mouth every 7 days., Disp: 4 tablet, Rfl: 6    allopurinol (ZYLOPRIM) 100 MG tablet, TAKE 1 TABLET(100 MG) BY MOUTH EVERY DAY, Disp: 90 tablet, Rfl: 3    alprazolam (XANAX) 1 MG tablet, Take 1 tablet (1 mg total) by mouth every evening., Disp: 30 tablet, Rfl: 0    aspirin (ECOTRIN) 81 MG EC tablet, Take 81 mg by mouth once daily., Disp: , Rfl:     atorvastatin (LIPITOR) 80 MG tablet, TAKE 1 TABLET(80 MG) BY MOUTH EVERY DAY, Disp: 90 tablet, Rfl: 3    calcitRIOL (ROCALTROL) 0.5 MCG Cap, , Disp: , Rfl:     carvedilol (COREG) 25 MG tablet, Take 1 tablet (25 mg total) by mouth 2 (two) times daily with meals., Disp: 180 tablet, Rfl: 3    ciclopirox (PENLAC) 8 % Soln, Apply topically nightly. (Patient taking differently: Apply topically daily as needed. ), Disp: 6.6 mL, Rfl: 11    ciclopirox 0.77 % Gel, Apply topically 2 (two) times daily., Disp: 100 g, Rfl: 3    clopidogrel (PLAVIX) 75 mg tablet, Take 1 tablet (75 mg total) by mouth once daily., Disp: 90 tablet, Rfl: 11    clotrimazole-betamethasone 1-0.05% (LOTRISONE) cream, Apply topically 2 (two) times daily. To head of penis (Patient taking differently: Apply topically daily as needed. To head of penis), Disp: 1 Tube, Rfl: 2    ergocalciferol (ERGOCALCIFEROL) 50,000 unit Cap, TK 1 C PO Q WEEK, Disp: , Rfl: 0    escitalopram oxalate (LEXAPRO) 10 MG tablet, Take 1/2 tablet (5 mg) daily for first 7 days.  Thereafter take one tablet (10 mg) daily. (Patient taking differently: Take 10 mg by mouth once daily. ), Disp: 30 tablet, Rfl: 11    ferrous sulfate 325 mg (65 mg iron) Tab tablet, TAKE 1 TABLET BY MOUTH TWICE DAILY, Disp:  180 tablet, Rfl: 3    fluticasone (FLONASE) 50 mcg/actuation nasal spray, 2 sprays by Each Nare route once daily., Disp: 1 Bottle, Rfl: 11    FOLIC ACID/MULTIVIT-MIN/LUTEIN (CENTRUM SILVER ORAL), Take 1 tablet by mouth once daily., Disp: , Rfl:     furosemide (LASIX) 40 MG tablet, TAKE 1 TABLET BY MOUTH DAILY AS NEEDED, Disp: 90 tablet, Rfl: 3    hydrocodone-acetaminophen 7.5-325mg (NORCO) 7.5-325 mg per tablet, Take 1 tablet by mouth every 6 (six) hours as needed for Pain., Disp: 90 tablet, Rfl: 0    L-METHYL-B6-B12 3-35-2 mg Tab, TAKE 1 TABLET BY MOUTH TWICE DAILY, Disp: 180 tablet, Rfl: 3    lisinopril (PRINIVIL,ZESTRIL) 40 MG tablet, Take 40 mg by mouth every evening. , Disp: , Rfl:     MAGOX 400 mg tablet, TAKE 1 TABLET(400 MG) BY MOUTH EVERY DAY, Disp: 90 tablet, Rfl: 3    mirabegron (MYRBETRIQ) 50 mg Tb24, Take 1 tablet (50 mg total) by mouth once daily., Disp: 30 tablet, Rfl: 11    mupirocin (BACTROBAN) 2 % ointment, Apply topically 2 (two) times daily., Disp: 30 g, Rfl: 1    nitroGLYCERIN 0.4 MG/DOSE TL SPRY (NITROLINGUAL) 400 mcg/spray spray, PLACE 1 SPRAY UNDER THE TONGUE EVERY 5 MINUTES AS NEEDED FOR CHEST PAIN, Disp: 4.9 g, Rfl: 3    omeprazole (PRILOSEC) 20 MG capsule, TAKE 1 CAPSULE(20 MG) BY MOUTH TWICE DAILY, Disp: 180 capsule, Rfl: 0    omeprazole (PRILOSEC) 20 MG capsule, Take 1 capsule (20 mg total) by mouth once daily., Disp: 30 capsule, Rfl: 3    ranitidine (ZANTAC) 150 MG tablet, TAKE 1 TABLET(150 MG) BY MOUTH TWICE DAILY, Disp: 180 tablet, Rfl: 3    salicylic acid (SALACYN) 6 % Crea, Apply 1 application topically 2 (two) times daily. (Patient taking differently: Apply 1 application topically daily as needed. ), Disp: 454 g, Rfl: 11    vit C-vit E-lutein-min-om-3 (OCUVITE) 704-06-6-150 mg-unit-mg-mg Cap, Take 1 capsule by mouth once daily., Disp: , Rfl:     warfarin (COUMADIN) 5 MG tablet, TAKE 1 TABLET BY MOUTH EVERY DAY, Disp: 90 tablet, Rfl: 3  Assessment:       1. GARY  (generalized anxiety disorder)    2. Type 2 diabetes mellitus with diabetic neuropathy, without long-term current use of insulin    3. Hypertension associated with diabetes    4. Morbid obesity due to excess calories        Plan:     GARY (generalized anxiety disorder)  Continue Lexapro but take at night to prevent lightheadedness.  Patient does not want to increase Lexapro today due to side effect.     Type 2 diabetes mellitus with diabetic neuropathy, without long-term current use of insulin  Controlled on current treatment.    Hypertension associated with diabetes  Controlled on current treatment.    Morbid obesity  S/p gastric bypass 2008.  4 lb weight loss since last visit.    Patient readiness: acceptance and barriers:readiness    During the course of the visit the patient was educated and counseled about the following:     Diabetes:  Reminded to get yearly retinal exam.  Hypertension:   Medication: no change.  Obesity:   Diet interventions: moderate (500 kCal/d) deficit diet.    Goals: Diabetes: Maintain Hemoglobin A1C below 7, Hypertension: Reduce Blood Pressure and Obesity: Reduce calorie intake and BMI    Did patient meet goals/outcomes: Yes    The following self management tools provided: declined    Patient Instructions (the written plan) was given to the patient/family.     Time spent with patient: 15 minutes

## 2017-07-11 ENCOUNTER — OFFICE VISIT (OUTPATIENT)
Dept: UROLOGY | Facility: CLINIC | Age: 72
End: 2017-07-11
Payer: MEDICARE

## 2017-07-11 VITALS
DIASTOLIC BLOOD PRESSURE: 74 MMHG | BODY MASS INDEX: 42.63 KG/M2 | HEART RATE: 47 BPM | HEIGHT: 70 IN | TEMPERATURE: 98 F | SYSTOLIC BLOOD PRESSURE: 130 MMHG | WEIGHT: 297.81 LBS

## 2017-07-11 DIAGNOSIS — N28.1 RENAL CYST: ICD-10-CM

## 2017-07-11 DIAGNOSIS — C61 PROSTATE CANCER: Primary | ICD-10-CM

## 2017-07-11 DIAGNOSIS — N32.81 OAB (OVERACTIVE BLADDER): ICD-10-CM

## 2017-07-11 LAB
BILIRUB SERPL-MCNC: ABNORMAL MG/DL
BLOOD URINE, POC: ABNORMAL
COLOR, POC UA: YELLOW
GLUCOSE UR QL STRIP: ABNORMAL
KETONES UR QL STRIP: ABNORMAL
LEUKOCYTE ESTERASE URINE, POC: ABNORMAL
NITRITE, POC UA: ABNORMAL
PH, POC UA: 5
PROTEIN, POC: ABNORMAL
SPECIFIC GRAVITY, POC UA: 1.01
UROBILINOGEN, POC UA: ABNORMAL

## 2017-07-11 PROCEDURE — 1125F AMNT PAIN NOTED PAIN PRSNT: CPT | Mod: S$GLB,,, | Performed by: UROLOGY

## 2017-07-11 PROCEDURE — 99999 PR PBB SHADOW E&M-EST. PATIENT-LVL III: CPT | Mod: PBBFAC,,, | Performed by: UROLOGY

## 2017-07-11 PROCEDURE — 81002 URINALYSIS NONAUTO W/O SCOPE: CPT | Mod: S$GLB,,, | Performed by: UROLOGY

## 2017-07-11 PROCEDURE — 96402 CHEMO HORMON ANTINEOPL SQ/IM: CPT | Mod: S$GLB,,, | Performed by: UROLOGY

## 2017-07-11 PROCEDURE — 99213 OFFICE O/P EST LOW 20 MIN: CPT | Mod: 25,S$GLB,, | Performed by: UROLOGY

## 2017-07-11 PROCEDURE — 1159F MED LIST DOCD IN RCRD: CPT | Mod: S$GLB,,, | Performed by: UROLOGY

## 2017-07-11 PROCEDURE — 99499 UNLISTED E&M SERVICE: CPT | Mod: S$PBB,,, | Performed by: UROLOGY

## 2017-07-11 RX ORDER — CALCIUM CARBONATE 400(1000)
1 TABLET,CHEWABLE ORAL DAILY
Qty: 30 EACH | Refills: 11 | COMMUNITY
Start: 2017-07-11 | End: 2018-07-12 | Stop reason: SDUPTHER

## 2017-07-11 NOTE — PROGRESS NOTES
[facility] Progress Note - [place]  Urology Group: Isa/Brijesh/Becca/Galina  PCP: Dr.James newcomb [facility]    Chief Complaint: Follow-up      Subjective:        HPI: Richard Bustos is a 71 y.o. male presents with     Last seen 2/13/17 (5 months ago)     Prostate cancer  Pt was referred to  for Gl 4+4, T2c, N0M0 prostate cancer in mid June 2016. Decided on radiation with concurrent ADT x 2 years, first Trelstar given 6/2016.. Underwent gold seed on 8/1/16. Pt completed 7560 Gy, 42 fractions of IRT (9/1/16-10/31/16). He returns today with 6/12/17 labs - psa  (<0.01), T (13) and ADT. Last injection 12/19/16. Here today for injection.  Started him on calcium and vit D. He says he has not been taking.    +lower back pain - has known protruding disc. No bone pain.     oab  He states he tolerated the radiation but when I saw him in December he was having Frequency q2-3 hours, UUI that occured 3-4x a daily requiring 1 pad a day. Not on any med. Denies straining. Good stream. I started him on myrbetriq 50mg once a day. No longer wearing any pads, has UUI about 2x a month now (uses a cane). No SE from meds. Still happy on meds.   Takes lasix with increased frequency  Drinks 1 glass of tea a day, 1-2 cups of coffee per day.     Right renal hemhorragic cysts  rbus 1/18/17 confirmed these were cysts    Denies any other uti symptoms - no hesistancy, good stream.     AUASSx: 15, mixed  IIEF:6     REVIEW OF SYSTEMS:  General ROS: no fevers, no chills  Psychological ROS: no depression  Endocrine ROS: no heat or cold  Respiratory ROS: no SOB  Cardiovascular ROS: no CP  Gastrointestinal ROS: no abdominal pain, no constipation, no diarrhea, no BRBPR  Musculoskeletal ROS: no muscle pain  Neurological ROS: no headaches  Dermatological ROS: no rashes  HEENT: +glasses, no sinus   ROS: per HPI    The past medical, surgical, social and family hx were reviewed. There have not been any  changes.    Cataracts? removed    Urologic meds: myrbertriq 50mg daily  Anticoagulation: Yes - coumadin for hypercoagulable state    Objective:     Vitals:    07/11/17 1138   BP: 130/74   Pulse: (!) 47   Temp: 98.3 °F (36.8 °C)          Physical Exam   Constitutional: He is oriented to person, place, and time. He appears well-developed and well-nourished. He is cooperative. No distress.   HENT:   Head: Normocephalic and atraumatic.   Eyes: Pupils are equal, round, and reactive to light.   Cardiovascular: Normal rate and regular rhythm.    Pulmonary/Chest: Effort normal.   Abdominal: Soft. Normal appearance.   Musculoskeletal: Normal range of motion.   Neurological: He is alert and oriented to person, place, and time. He has normal reflexes.   Skin: Skin is intact.     Psychiatric: He has a normal mood and affect.         Urinalysis: 1.010/5/tr protein- will monitor this, may need nephrology fu  Labs:    psa     6/12/17 <0.01  12/12/16 <0.01  8/1/16  3.3  3/18/16 7.7  2/3/15   6.1  8/22/14  5.3  9/26/11  3.9  8/26/10  3.0    Path:     LEFT    RIGHT  BASE   3+3 (1/2),  4+3 (1/2)     MID    4+4 (1/2)    b9  APEX    b9      b9   Date of Biopsy: 5/23/16  PSA: 7.7  Volume: 49.6  Prostate nodule: no    Rads:   1/18/17 rbus  Multiple bilateral renal cysts  4 right renal hemhorragic cysts - 1.8 cm, 6.8 cm, 4.5 cm and 1.6     6/14/16   ctap   Bilateral renal masses  No significant adenopathy  8cm complex fluid collection    Bone scan 6/14/16  Negative    Assessment:       1. Prostate cancer    2. OAB (overactive bladder)    3. Renal cyst        Plan:     Prostate cancer, Gl 4+3 treated with radiation and ADT  -here for ADT today (has chronic subq hematoma from 2015)  -psa, T and ADT 6 months after last one (6/12/17)  -ADT x 2 years - last one is June 2018  While on ADT will take the following:   -wrote for aldenornate/vit D3 - fosamax 70mg/2800 once a week, SE discussed   -1000mg calcium daily (2 tums a day)    OAB   Continue  myrbetriq doing well on this  -avoid all caffeinated drinks    hemhorragic renal cyst  - rbus done and these were confirmed to be multiple right renal hemhorragic cysts    F/u 6 months with T, PSA prior for trelstar (should be scheduled already)

## 2017-07-11 NOTE — PATIENT INSTRUCTIONS
Prostate cancer, Gl 4+3 treated with radiation and ADT  -psa, T and ADT 6 months after last one (6/12/17)  -ADT x 2 years - last one is June 2018  While on ADT (androgen deprivation therapy - cutting out testosterone) will take the following:   -wrote for aldenornate/vit D3 - fosamax 70mg/2800 once a week - 1 pill every 7 days  -1000mg calcium daily (2 tums a day)    OAB   Continue myrbetriq doing well on this  -avoid all caffeinated drinks (tea and coffee)    hemhorragic renal cyst  - rbus done and these were confirmed to be multiple right renal hemhorragic cysts    Followup 6 months with T, PSA prior for trelstar (should be scheduled already)

## 2017-07-13 ENCOUNTER — HOSPITAL ENCOUNTER (OUTPATIENT)
Facility: AMBULARY SURGERY CENTER | Age: 72
Discharge: HOME | End: 2017-07-13
Attending: ANESTHESIOLOGY | Admitting: ANESTHESIOLOGY
Payer: MEDICARE

## 2017-07-13 ENCOUNTER — SURGERY (OUTPATIENT)
Age: 72
End: 2017-07-13

## 2017-07-13 DIAGNOSIS — M47.819 FACET ARTHROPATHY: Primary | ICD-10-CM

## 2017-07-13 PROCEDURE — 64495 INJ PARAVERT F JNT L/S 3 LEV: CPT | Mod: RT | Performed by: ANESTHESIOLOGY

## 2017-07-13 PROCEDURE — 99152 MOD SED SAME PHYS/QHP 5/>YRS: CPT | Mod: ,,, | Performed by: ANESTHESIOLOGY

## 2017-07-13 PROCEDURE — 64493 INJ PARAVERT F JNT L/S 1 LEV: CPT | Mod: 50,,, | Performed by: ANESTHESIOLOGY

## 2017-07-13 PROCEDURE — 64493 INJ PARAVERT F JNT L/S 1 LEV: CPT | Mod: RT | Performed by: ANESTHESIOLOGY

## 2017-07-13 PROCEDURE — 64495 INJ PARAVERT F JNT L/S 3 LEV: CPT | Mod: 50,,, | Performed by: ANESTHESIOLOGY

## 2017-07-13 PROCEDURE — 64494 INJ PARAVERT F JNT L/S 2 LEV: CPT | Mod: 50,,, | Performed by: ANESTHESIOLOGY

## 2017-07-13 PROCEDURE — 64494 INJ PARAVERT F JNT L/S 2 LEV: CPT | Mod: LT | Performed by: ANESTHESIOLOGY

## 2017-07-13 RX ORDER — LIDOCAINE HYDROCHLORIDE 10 MG/ML
INJECTION, SOLUTION EPIDURAL; INFILTRATION; INTRACAUDAL; PERINEURAL
Status: DISCONTINUED | OUTPATIENT
Start: 2017-07-13 | End: 2017-07-13 | Stop reason: HOSPADM

## 2017-07-13 RX ORDER — LIDOCAINE HYDROCHLORIDE 10 MG/ML
INJECTION, SOLUTION EPIDURAL; INFILTRATION; INTRACAUDAL; PERINEURAL
Status: DISCONTINUED
Start: 2017-07-13 | End: 2017-07-13 | Stop reason: HOSPADM

## 2017-07-13 RX ORDER — ALPRAZOLAM 1 MG/1
1 TABLET, ORALLY DISINTEGRATING ORAL
Status: DISCONTINUED | OUTPATIENT
Start: 2017-07-13 | End: 2017-07-13 | Stop reason: HOSPADM

## 2017-07-13 RX ORDER — MIDAZOLAM HYDROCHLORIDE 2 MG/2ML
INJECTION, SOLUTION INTRAMUSCULAR; INTRAVENOUS
Status: DISCONTINUED | OUTPATIENT
Start: 2017-07-13 | End: 2017-07-13 | Stop reason: HOSPADM

## 2017-07-13 RX ORDER — BUPIVACAINE HYDROCHLORIDE 5 MG/ML
INJECTION, SOLUTION EPIDURAL; INTRACAUDAL
Status: DISCONTINUED
Start: 2017-07-13 | End: 2017-07-13 | Stop reason: HOSPADM

## 2017-07-13 RX ORDER — BUPIVACAINE HYDROCHLORIDE 5 MG/ML
INJECTION, SOLUTION EPIDURAL; INTRACAUDAL
Status: DISCONTINUED | OUTPATIENT
Start: 2017-07-13 | End: 2017-07-13 | Stop reason: HOSPADM

## 2017-07-13 RX ORDER — MIDAZOLAM HYDROCHLORIDE 1 MG/ML
INJECTION INTRAMUSCULAR; INTRAVENOUS
Status: DISCONTINUED
Start: 2017-07-13 | End: 2017-07-13 | Stop reason: HOSPADM

## 2017-07-13 RX ORDER — SODIUM CHLORIDE, SODIUM LACTATE, POTASSIUM CHLORIDE, CALCIUM CHLORIDE 600; 310; 30; 20 MG/100ML; MG/100ML; MG/100ML; MG/100ML
INJECTION, SOLUTION INTRAVENOUS ONCE AS NEEDED
Status: COMPLETED | OUTPATIENT
Start: 2017-07-13 | End: 2017-07-13

## 2017-07-13 RX ADMIN — SODIUM CHLORIDE, SODIUM LACTATE, POTASSIUM CHLORIDE, CALCIUM CHLORIDE: 600; 310; 30; 20 INJECTION, SOLUTION INTRAVENOUS at 03:07

## 2017-07-13 RX ADMIN — LIDOCAINE HYDROCHLORIDE 10 ML: 10 INJECTION, SOLUTION EPIDURAL; INFILTRATION; INTRACAUDAL; PERINEURAL at 03:07

## 2017-07-13 RX ADMIN — MIDAZOLAM HYDROCHLORIDE 2 MG: 2 INJECTION, SOLUTION INTRAMUSCULAR; INTRAVENOUS at 03:07

## 2017-07-13 RX ADMIN — BUPIVACAINE HYDROCHLORIDE 5 ML: 5 INJECTION, SOLUTION EPIDURAL; INTRACAUDAL at 03:07

## 2017-07-13 NOTE — OP NOTE
PROCEDURE DATE: 7/13/2017    PROCEDURE:  Bilateral L3,4,5 medial branch nerve blocks    DIAGNOSIS:  Lumbar spondylosis without myelopathy    Post Op diagnosis: Same    PHYSICIAN: Nile Causey MD    MEDICATIONS INJECTED: 0.5% bupivicaine, 0.5 ml at each level    LOCAL ANESTHETIC USED: 0.5ml of 1% lidocaine at each site    SEDATION MEDICATIONS:IV Versed    ESTIMATED BLOOD LOSS:  None    COMPLICATIONS:  None    TECHNIQUE: A time out was taken to identify the patient, procedure and side of the procedure. The patient was placed in a prone position, then prepped and draped in the usual sterile fashion using ChloraPrep and sterile towels.  The levels were determined under fluoroscopic guidance and then marked. With a 1.5in 25g needle, wheal of local anesthetic was placed over each site.    A 22-gauge 3.5 inch needle was introduced to the anatomic location of the L3,4,5 medial branch nerves on the bilateral side. The above medication was then injected. The patient tolerated the procedure well.     The patient was monitored after the procedure. Patient was given pain diary to record pain levels at home.     If found to have greater than a 50% recovery and so will be scheduled for a radiofrequency ablation of the corresponding nerves.  Patient was given post procedure and discharge instructions to follow at home.  The patient was discharged in a stable condition.

## 2017-07-13 NOTE — DISCHARGE INSTRUCTIONS
Pain injection instructions:      Pain relief is only temporary, pain will return when numbing medicine wears off  Keep pain diary    No driving for 24 hrs   Perform activities that usually bothers your back to see how it feels  No heat at injection sites x 2 days  Use ice for mild swelling and for comfort.  May shower today. No baths for two days.      Seek immediate medical help for:   Severe increase in your usual pain or appearance of new pain.  Prolonged or increasing weakness or numbness in the legs or arms.  Fever above 101 ,Drainage,redness,active bleeding, or increased swelling at the injection site.  Headache, shortness of breath, chest pain, or breathing problems.

## 2017-07-13 NOTE — DISCHARGE SUMMARY
[facility]  Discharge Note  Short Stay    Admit Date: 7/13/2017    Discharge Date and Time: 7/13/2017    Attending Physician: Nile Causey MD     Discharge Provider: Nile Causey    Diagnoses:  Active Hospital Problems    Diagnosis  POA    *Facet arthropathy [M12.88]  Yes      Resolved Hospital Problems    Diagnosis Date Resolved POA   No resolved problems to display.       Hospital Course: lumbar MBB  Discharged Condition: Good    Final Diagnoses:   Active Hospital Problems    Diagnosis  POA    *Facet arthropathy [M12.88]  Yes      Resolved Hospital Problems    Diagnosis Date Resolved POA   No resolved problems to display.       Disposition: Home or Self Care    Follow up/Patient Instructions:    Medications:  Reconciled Home Medications:   Current Discharge Medication List      CONTINUE these medications which have NOT CHANGED    Details   alendronate-vitamin D3 (FOSAMAX PLUS D) 70 mg- 2,800 unit per tablet Take 1 tablet by mouth every 7 days.  Qty: 4 tablet, Refills: 6      aspirin (ECOTRIN) 81 MG EC tablet Take 81 mg by mouth once daily.      calcitRIOL (ROCALTROL) 0.5 MCG Cap       carvedilol (COREG) 25 MG tablet Take 1 tablet (25 mg total) by mouth 2 (two) times daily with meals.  Qty: 180 tablet, Refills: 3    Comments: **Patient requests 90 days supply**  Associated Diagnoses: Essential hypertension      clopidogrel (PLAVIX) 75 mg tablet Take 1 tablet (75 mg total) by mouth once daily.  Qty: 90 tablet, Refills: 11      ferrous sulfate 325 mg (65 mg iron) Tab tablet TAKE 1 TABLET BY MOUTH TWICE DAILY  Qty: 180 tablet, Refills: 3    Associated Diagnoses: Iron deficiency anemia      FOLIC ACID/MULTIVIT-MIN/LUTEIN (CENTRUM SILVER ORAL) Take 1 tablet by mouth once daily.      L-METHYL-B6-B12 3-35-2 mg Tab TAKE 1 TABLET BY MOUTH TWICE DAILY  Qty: 180 tablet, Refills: 3      lisinopril (PRINIVIL,ZESTRIL) 40 MG tablet Take 40 mg by mouth every evening.       mirabegron (MYRBETRIQ) 50 mg Tb24 Take 1 tablet  (50 mg total) by mouth once daily.  Qty: 30 tablet, Refills: 11      !! omeprazole (PRILOSEC) 20 MG capsule TAKE 1 CAPSULE(20 MG) BY MOUTH TWICE DAILY  Qty: 180 capsule, Refills: 0    Associated Diagnoses: Gastroesophageal reflux disease, esophagitis presence not specified      vit C-vit E-lutein-min-om-3 (OCUVITE) 215-11-2-150 mg-unit-mg-mg Cap Take 1 capsule by mouth once daily.      allopurinol (ZYLOPRIM) 100 MG tablet TAKE 1 TABLET(100 MG) BY MOUTH EVERY DAY  Qty: 90 tablet, Refills: 3    Associated Diagnoses: Acute gout of foot, unspecified cause, unspecified laterality      alprazolam (XANAX) 1 MG tablet Take 1 tablet (1 mg total) by mouth every evening.  Qty: 30 tablet, Refills: 0    Associated Diagnoses: Anxiety      atorvastatin (LIPITOR) 80 MG tablet TAKE 1 TABLET(80 MG) BY MOUTH EVERY DAY  Qty: 90 tablet, Refills: 3    Associated Diagnoses: Hyperlipidemia      calcium carbonate (TUMS ULTRA) 400 mg calcium (1,000 mg) Chew Take 1 tablet (400 mg total) by mouth once daily at 6am.  Qty: 30 each, Refills: 11      ciclopirox (PENLAC) 8 % Soln Apply topically nightly.  Qty: 6.6 mL, Refills: 11      ciclopirox 0.77 % Gel Apply topically 2 (two) times daily.  Qty: 100 g, Refills: 3      clotrimazole-betamethasone 1-0.05% (LOTRISONE) cream Apply topically 2 (two) times daily. To head of penis  Qty: 1 Tube, Refills: 2      ergocalciferol (ERGOCALCIFEROL) 50,000 unit Cap TK 1 C PO Q WEEK  Refills: 0      escitalopram oxalate (LEXAPRO) 10 MG tablet Take 1/2 tablet (5 mg) daily for first 7 days.  Thereafter take one tablet (10 mg) daily.  Qty: 30 tablet, Refills: 11      fluticasone (FLONASE) 50 mcg/actuation nasal spray 2 sprays by Each Nare route once daily.  Qty: 1 Bottle, Refills: 11    Associated Diagnoses: Chronic sinus complaints      furosemide (LASIX) 40 MG tablet TAKE 1 TABLET BY MOUTH DAILY AS NEEDED  Qty: 90 tablet, Refills: 3    Associated Diagnoses: Coronary artery disease involving native coronary artery  of native heart without angina pectoris      hydrocodone-acetaminophen 7.5-325mg (NORCO) 7.5-325 mg per tablet Take 1 tablet by mouth every 6 (six) hours as needed for Pain.  Qty: 90 tablet, Refills: 0    Associated Diagnoses: Chronic low back pain, unspecified back pain laterality, with sciatica presence unspecified      MAGOX 400 mg tablet TAKE 1 TABLET(400 MG) BY MOUTH EVERY DAY  Qty: 90 tablet, Refills: 3    Associated Diagnoses: Essential hypertension      mupirocin (BACTROBAN) 2 % ointment Apply topically 2 (two) times daily.  Qty: 30 g, Refills: 1    Associated Diagnoses: Abrasion      nitroGLYCERIN 0.4 MG/DOSE TL SPRY (NITROLINGUAL) 400 mcg/spray spray PLACE 1 SPRAY UNDER THE TONGUE EVERY 5 MINUTES AS NEEDED FOR CHEST PAIN  Qty: 4.9 g, Refills: 3      !! omeprazole (PRILOSEC) 20 MG capsule Take 1 capsule (20 mg total) by mouth once daily.  Qty: 30 capsule, Refills: 3      ranitidine (ZANTAC) 150 MG tablet TAKE 1 TABLET(150 MG) BY MOUTH TWICE DAILY  Qty: 180 tablet, Refills: 3    Associated Diagnoses: Gastroesophageal reflux disease, esophagitis presence not specified      salicylic acid (SALACYN) 6 % Crea Apply 1 application topically 2 (two) times daily.  Qty: 454 g, Refills: 11      warfarin (COUMADIN) 5 MG tablet TAKE 1 TABLET BY MOUTH EVERY DAY  Qty: 90 tablet, Refills: 3    Associated Diagnoses: Paroxysmal atrial fibrillation; MTHFR mutation (methylenetetrahydrofolate reductase)       !! - Potential duplicate medications found. Please discuss with provider.          Discharge Procedure Orders  Diet general     Activity as tolerated     Call MD for:  temperature >100.4     Call MD for:  persistent nausea and vomiting or diarrhea     Call MD for:  severe uncontrolled pain     Call MD for:  redness, tenderness, or signs of infection (pain, swelling, redness, odor or green/yellow discharge around incision site)     Call MD for:  difficulty breathing or increased cough     Call MD for:  severe persistent  headache          Follow up with MD in 2-3 weeks    Discharge Procedure Orders (must include Diet, Follow-up, Activity):    Discharge Procedure Orders (must include Diet, Follow-up, Activity)  Diet general     Activity as tolerated     Call MD for:  temperature >100.4     Call MD for:  persistent nausea and vomiting or diarrhea     Call MD for:  severe uncontrolled pain     Call MD for:  redness, tenderness, or signs of infection (pain, swelling, redness, odor or green/yellow discharge around incision site)     Call MD for:  difficulty breathing or increased cough     Call MD for:  severe persistent headache

## 2017-07-13 NOTE — H&P (VIEW-ONLY)
[facility] Progress Note - [place]  Urology Group: Isa/Brijesh/Becca/Galina  PCP: Dr.James newcomb [facility]    Chief Complaint: Follow-up      Subjective:        HPI: Richard Bustos is a 71 y.o. male presents with     Last seen 2/13/17 (5 months ago)     Prostate cancer  Pt was referred to  for Gl 4+4, T2c, N0M0 prostate cancer in mid June 2016. Decided on radiation with concurrent ADT x 2 years, first Trelstar given 6/2016.. Underwent gold seed on 8/1/16. Pt completed 7560 Gy, 42 fractions of IRT (9/1/16-10/31/16). He returns today with 6/12/17 labs - psa  (<0.01), T (13) and ADT. Last injection 12/19/16. Here today for injection.  Started him on calcium and vit D. He says he has not been taking.    +lower back pain - has known protruding disc. No bone pain.     oab  He states he tolerated the radiation but when I saw him in December he was having Frequency q2-3 hours, UUI that occured 3-4x a daily requiring 1 pad a day. Not on any med. Denies straining. Good stream. I started him on myrbetriq 50mg once a day. No longer wearing any pads, has UUI about 2x a month now (uses a cane). No SE from meds. Still happy on meds.   Takes lasix with increased frequency  Drinks 1 glass of tea a day, 1-2 cups of coffee per day.     Right renal hemhorragic cysts  rbus 1/18/17 confirmed these were cysts    Denies any other uti symptoms - no hesistancy, good stream.     AUASSx: 15, mixed  IIEF:6     REVIEW OF SYSTEMS:  General ROS: no fevers, no chills  Psychological ROS: no depression  Endocrine ROS: no heat or cold  Respiratory ROS: no SOB  Cardiovascular ROS: no CP  Gastrointestinal ROS: no abdominal pain, no constipation, no diarrhea, no BRBPR  Musculoskeletal ROS: no muscle pain  Neurological ROS: no headaches  Dermatological ROS: no rashes  HEENT: +glasses, no sinus   ROS: per HPI    The past medical, surgical, social and family hx were reviewed. There have not been any  changes.    Cataracts? removed    Urologic meds: myrbertriq 50mg daily  Anticoagulation: Yes - coumadin for hypercoagulable state    Objective:     Vitals:    07/11/17 1138   BP: 130/74   Pulse: (!) 47   Temp: 98.3 °F (36.8 °C)          Physical Exam   Constitutional: He is oriented to person, place, and time. He appears well-developed and well-nourished. He is cooperative. No distress.   HENT:   Head: Normocephalic and atraumatic.   Eyes: Pupils are equal, round, and reactive to light.   Cardiovascular: Normal rate and regular rhythm.    Pulmonary/Chest: Effort normal.   Abdominal: Soft. Normal appearance.   Musculoskeletal: Normal range of motion.   Neurological: He is alert and oriented to person, place, and time. He has normal reflexes.   Skin: Skin is intact.     Psychiatric: He has a normal mood and affect.         Urinalysis: 1.010/5/tr protein- will monitor this, may need nephrology fu  Labs:    psa     6/12/17 <0.01  12/12/16 <0.01  8/1/16  3.3  3/18/16 7.7  2/3/15   6.1  8/22/14  5.3  9/26/11  3.9  8/26/10  3.0    Path:     LEFT    RIGHT  BASE   3+3 (1/2),  4+3 (1/2)     MID    4+4 (1/2)    b9  APEX    b9      b9   Date of Biopsy: 5/23/16  PSA: 7.7  Volume: 49.6  Prostate nodule: no    Rads:   1/18/17 rbus  Multiple bilateral renal cysts  4 right renal hemhorragic cysts - 1.8 cm, 6.8 cm, 4.5 cm and 1.6     6/14/16   ctap   Bilateral renal masses  No significant adenopathy  8cm complex fluid collection    Bone scan 6/14/16  Negative    Assessment:       1. Prostate cancer    2. OAB (overactive bladder)    3. Renal cyst        Plan:     Prostate cancer, Gl 4+3 treated with radiation and ADT  -here for ADT today (has chronic subq hematoma from 2015)  -psa, T and ADT 6 months after last one (6/12/17)  -ADT x 2 years - last one is June 2018  While on ADT will take the following:   -wrote for aldenornate/vit D3 - fosamax 70mg/2800 once a week, SE discussed   -1000mg calcium daily (2 tums a day)    OAB   Continue  myrbetriq doing well on this  -avoid all caffeinated drinks    hemhorragic renal cyst  - rbus done and these were confirmed to be multiple right renal hemhorragic cysts    F/u 6 months with T, PSA prior for trelstar (should be scheduled already)

## 2017-07-14 VITALS
SYSTOLIC BLOOD PRESSURE: 124 MMHG | OXYGEN SATURATION: 96 % | HEIGHT: 70 IN | HEART RATE: 62 BPM | DIASTOLIC BLOOD PRESSURE: 67 MMHG | TEMPERATURE: 98 F | WEIGHT: 300.94 LBS | BODY MASS INDEX: 43.08 KG/M2 | RESPIRATION RATE: 20 BRPM

## 2017-07-14 LAB — POCT GLUCOSE: 116 MG/DL (ref 70–110)

## 2017-07-17 ENCOUNTER — TELEPHONE (OUTPATIENT)
Dept: PAIN MEDICINE | Facility: CLINIC | Age: 72
End: 2017-07-17

## 2017-07-17 NOTE — TELEPHONE ENCOUNTER
Pt had a 50% or greater decrease in pain. Will proceed with RF to corresponding nerves. Pt scheduled 8/3/17

## 2017-07-24 DIAGNOSIS — M47.816 FACET ARTHROPATHY, LUMBAR: Primary | ICD-10-CM

## 2017-07-28 ENCOUNTER — OFFICE VISIT (OUTPATIENT)
Dept: CARDIOLOGY | Facility: CLINIC | Age: 72
End: 2017-07-28
Payer: MEDICARE

## 2017-07-28 VITALS
WEIGHT: 305.31 LBS | OXYGEN SATURATION: 93 % | HEIGHT: 69 IN | DIASTOLIC BLOOD PRESSURE: 81 MMHG | HEART RATE: 58 BPM | BODY MASS INDEX: 45.22 KG/M2 | SYSTOLIC BLOOD PRESSURE: 172 MMHG

## 2017-07-28 DIAGNOSIS — E11.22 TYPE 2 DIABETES MELLITUS WITH STAGE 3 CHRONIC KIDNEY DISEASE, WITH LONG-TERM CURRENT USE OF INSULIN: Chronic | ICD-10-CM

## 2017-07-28 DIAGNOSIS — I51.9 LV DYSFUNCTION: ICD-10-CM

## 2017-07-28 DIAGNOSIS — E66.01 MORBID OBESITY DUE TO EXCESS CALORIES: ICD-10-CM

## 2017-07-28 DIAGNOSIS — I11.9 HYPERTENSIVE LEFT VENTRICULAR HYPERTROPHY, WITHOUT HEART FAILURE: ICD-10-CM

## 2017-07-28 DIAGNOSIS — Z79.4 TYPE 2 DIABETES MELLITUS WITH STAGE 3 CHRONIC KIDNEY DISEASE, WITH LONG-TERM CURRENT USE OF INSULIN: Chronic | ICD-10-CM

## 2017-07-28 DIAGNOSIS — I25.10 CORONARY ARTERY DISEASE, ANGINA PRESENCE UNSPECIFIED, UNSPECIFIED VESSEL OR LESION TYPE, UNSPECIFIED WHETHER NATIVE OR TRANSPLANTED HEART: Primary | ICD-10-CM

## 2017-07-28 DIAGNOSIS — N18.30 TYPE 2 DIABETES MELLITUS WITH STAGE 3 CHRONIC KIDNEY DISEASE, WITH LONG-TERM CURRENT USE OF INSULIN: Chronic | ICD-10-CM

## 2017-07-28 DIAGNOSIS — R94.39 ABNORMAL CARDIOVASCULAR STRESS TEST: ICD-10-CM

## 2017-07-28 DIAGNOSIS — I71.20 THORACIC AORTIC ANEURYSM WITHOUT RUPTURE: Chronic | ICD-10-CM

## 2017-07-28 DIAGNOSIS — G47.33 OBSTRUCTIVE SLEEP APNEA SYNDROME: Chronic | ICD-10-CM

## 2017-07-28 DIAGNOSIS — N18.30 CKD (CHRONIC KIDNEY DISEASE) STAGE 3, GFR 30-59 ML/MIN: ICD-10-CM

## 2017-07-28 PROCEDURE — 93000 ELECTROCARDIOGRAM COMPLETE: CPT | Mod: S$GLB,,, | Performed by: INTERNAL MEDICINE

## 2017-07-28 PROCEDURE — 1126F AMNT PAIN NOTED NONE PRSNT: CPT | Mod: S$GLB,,, | Performed by: INTERNAL MEDICINE

## 2017-07-28 PROCEDURE — 4010F ACE/ARB THERAPY RXD/TAKEN: CPT | Mod: S$GLB,,, | Performed by: INTERNAL MEDICINE

## 2017-07-28 PROCEDURE — 99999 PR PBB SHADOW E&M-EST. PATIENT-LVL III: CPT | Mod: PBBFAC,,, | Performed by: INTERNAL MEDICINE

## 2017-07-28 PROCEDURE — 3044F HG A1C LEVEL LT 7.0%: CPT | Mod: S$GLB,,, | Performed by: INTERNAL MEDICINE

## 2017-07-28 PROCEDURE — 99214 OFFICE O/P EST MOD 30 MIN: CPT | Mod: S$GLB,,, | Performed by: INTERNAL MEDICINE

## 2017-07-28 PROCEDURE — 99499 UNLISTED E&M SERVICE: CPT | Mod: S$PBB,,, | Performed by: INTERNAL MEDICINE

## 2017-07-28 PROCEDURE — 1159F MED LIST DOCD IN RCRD: CPT | Mod: S$GLB,,, | Performed by: INTERNAL MEDICINE

## 2017-07-28 NOTE — PROGRESS NOTES
Patient ID: Richard Bustos is a 69 y.o. male who presents for follow-up of Follow-up  For CAD, stress related chest pains, chronic fatigue, said to be referred by Ms. Cardenas, [facility] for HTN  PCP: Dr. Ramirez, see every 4 months, does labs through [facility]  Hematologist: Dr. Quezada  Urologist: Dr. Moss  Podiatrist: Dr. Nunes, [place]  Wound care: Dr. Torrez  Pain: Dr. 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 Suffered a NSTEMI in 8/2010, catheterization showed SUMMARY:   1. Three vessel coronary artery disease.   2. Successful PCI. Involved 2 JAYCEE in the LAD and OM1.    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 Off diet at times, had underwent gastri bypass in 2008.    Last Echo in 2010 showed CONCLUSIONS   1 - Mild left ventricular enlargement.   2 - Normal left ventricular function (EF 58%).   3 - Diastolic dysfunction.   4 - Biatrial enlargement.   5 - Mildly to moderately enlarged ascending aorta.    Last carotid US: 9/13/2010  Bilateral 20%-40% narrowing.  No symptoms.    Coronary Artery Disease  Presents for follow-up visit. Pertinent negatives include no chest pain, dizziness, leg swelling, muscle weakness, palpitations, shortness of breath or weight gain. Risk factors include hyperlipidemia. The symptoms have been resolved. Compliance with diet is variable. Compliance with exercise is good. Compliance with medications is good.   Hyperlipidemia  Pertinent negatives include no chest pain, focal weakness, myalgias or shortness of  "breath.     EKG shows sinus bradycardia, rate 45, today NSR, 75, VPC, and nonspecific STTW abnormalities.     Since visit of 7/2012, have lots of stress with tenants, BP noted to be elevated, log reviewed 153-174/, HR 52-74. Had cardiac testing -   ECHO CONCLUSIONS 7/2012   1 - Mild left ventricular enlargement.   2 - Mildly to moderately depressed left ventricular function (EF 40%).   3 - Eccentric hypertrophy.   4 - Mildly to moderately enlarged ascending aorta.   5 - Mild left atrial enlargement.   6 - Normal diastolic function.   7 - Mild aortic regurgitation.   8 - Suggestion for inferoposterior hypokinesia.   9 - Compare to 8/17/2010, there is increase in LVH with decrease in   LVEF from 58%, and new inferoposterior hypokinesia. The Aortic dimensions   have not changed.    Carotid US, 7/2012  IMPRESSION   Findings consistent with less than 50% diameter stenosis of proximal   internal carotid arteries by NASCAET criteria. Flow in the vertebral   arteries appears antegrade bilaterally.    Since visit of 1/23, still with occasional angina, average once a week, lasting about a minute, "light" grade 3-4/10.  Workup:  ECHO, 2/5/20163, CONCLUSIONS   1 - Mild left ventricular enlargement.   2 - Mildly to moderately depressed left ventricular function (EF 40%).   3 - Normal diastolic function.   4 - Inferoposterior hypokinesia consistent with ischemic disease..   5 - Moderately enlarged ascending aorta. 4.4 cm - stable   6 - No significant change from study of 7/17/2012.    Lexiscan, 2/5/2013  Nuclear Quantitative Functional Analysis:   LVEF: 34 % (normal is 47 - 59)   LVED Volume: 131 ml (normal is 91 - 155)   LVES Volume: 87 ml (normal is 40 - 78)   Impression: ABNORMAL MYOCARDIAL PERFUSION   1. There is evidence for mild to moderate myocardial ischemia in the   lateral apical wall of the left ventricle with associated stress induced   LV cavity dilatation with underlying injury present.   2. There is moderate " intensity fixed defect in the inferolateral wall of   the left ventricle, consistent with myocardial injury. There is trivial   francine-injury ischemia.   3. There is abnormal wall motion at rest showing akinesis of the lateral   wall of the left ventricle, moderate global hypokinesis of the left   ventricle. dyskinesis of the inferolateral wall of the left ventricle.   4. There is resting LV dysfunction with a reduced ejection fraction of 34   %. (normal is 47 - 59)   5. The left ventricular volume is mildly increased at rest.   6. The extracardiac distribution of radioactivity is normal.  7. [facility] SSS =15, SRS =10, SDS =5, suggest 6.25% of myocardium may be   ischemic.    Since visit of 2/8, noticed some bilateral leg weakness, post bilateral THR, also occasional charley horse at night, mostly right sided. Today had mild chest pain, grade 2/10, while driving here. Claims to have not heard from [facility], INR on 2/18 was 1.8. Question about Plavix answered. Discussed with daughter, Citlali, over the telephone.     Since visit 2/22, no new problem. Discussed aortic aneurysm, will need at least annual imaging, do not recall the last time. Have claustrophobia requesting Xanax. No problem with the medications, stays very active. Weight reviewed was below 290 lbs in 12/2011.   Apparently likes Arcadio rincon!     Since visit of 3/16, had MRA done with use of Xanax and hydrocodone,   Result - Max ascending aorta 4.3 cm stable.  Tolerating medications without problem, using BiPAP nightly, still lot of stress with [employer]ental [employer]. Discuss need for exercise program with weigh reduction of 10%. New problem include left leg weakness with localized pain behind the knee. For about 2 weeks, been dancing with new woman.    Since visit of 4/11/2013, hospitalized 2 weeks ago at [facility] for large hematoma due to use of Lovenox as bridge for oral surgery. Coumadin on hold, seems to be getting smaller and  softer. Denies any heart symptoms or CP nor SOB. No problem with taking medications and using CPAP. Slowed down due to leg pain and fatigue. Feels good in the AM.    Since visit of 4/9/2014, complain of leg weakness, noted since 2/2013. Denies any CP nor SOB. Miss 2-3 doses of medications monthly. No other problem with medications. Awaken all night due to dog, sleep 10-4. Feels tired in the morning despite using CPAP. Have not had much blood work done. ECG shows AF rate 67, RBBB.  ECHO in 5/2014 CONCLUSIONS   1 - Enlarged aortic root. measuring 3.9 cm at sinotubular junction.  2 - Biatrial enlargement.   3 - Eccentric hypertrophy.   4 - Mildly to moderately depressed left ventricular systolic function (EF 40-45%).   5 - Normal left ventricular diastolic function.   6 - Right ventricular enlargement with normal systolic function.   7 - No significant change from echo on 2/5/2013..   8 - Difficult apical window, Optison contrast used for wall motion analysis..     Since visit of 11/21, no new problem. Some concern of HR down to 40 after exercise. Noted easy fatigue but remains quite sedentary. Discussed need for Coreg titration for LV dysfunction. Labs reviewed, CKD eith eGFR of 47, mild anemia Hgb 12.1, , A1C 4.7, and proteinuria.   Cardiac workup:  Carotid US, 12/2014 - CONCLUSIONS   There is 0 - 19% right Internal Carotid stenosis.  There is 0 - 19% left Internal Carotid stenosis.    Clean vessels    Lexiscan Nuclear Quantitative Functional Analysis:   LVEF: 38 % (normal is 47 - 59)  LVED Volume: 172 ml (normal is 91 - 155)  LVES Volume: 107 ml (normal is 40 - 78)    Impression: ABNORMAL MYOCARDIAL PERFUSION  1. There is evidence for mild to moderate myocardial ischemia in the inferior and lateral walls of the left ventricle with associated stress induced LV cavity dilatation with underlying injury present.   2. There is moderate to severe intensity fixed defect in the inferolateral wall of the left  ventricle, consistent with myocardial injury.   3. There is abnormal wall motion at rest showing moderate global hypokinesis of the left ventricle. severe hypokinesis of the inferior and septal walls of the left ventricle.   4. There is resting LV dysfunction with a reduced ejection fraction of 38 %. (normal is 47 - 59)  5. The left ventricular volume is moderately increased at rest.   6. The extracardiac distribution of radioactivity is normal.   7. When compared to the previous study from 02/05/2013, no significant changes noted.  8. [facility] SSS=15, SRS=8, and SDS=7, suggest that 8.7% of myocardium may be ischemic.    Holter, 12/2014:  PREDOMINANT RHYTHM  1. Sinus arrhythmia with heart rates varying between 41 and 110 bpm with an average of 64 bpm.     VENTRICULAR ARRHYTHMIAS  1. There were frequent mostly monomorphic PVCs totalling 1297 and averaging 54 per hour. There was 1 couplet.    2. 1.6% of complexes.    3. There were no episodes of ventricular tachycardia.    SUPRA VENTRICULAR ARRHYTHMIAS  1. There were rare PACs totalling 108 and averaging 4 per hour.     2. There were no episodes of sustained supraventricular tachycardia.    SINUS NODE FUNCTION  1. There was no evidence of high grade SA sang block.     AV CONDUCTION  1. There was no evidence of high grade AV block.     DIARY  1. The diary was not returned    MISCELLANEOUS  1. There were persistent hookup related artifacts. Overall, the study was of good quality.     2. This was a tape of adequate length (24 hrs).     Since visit of 12/23/2014, underwent C with PCI of RCA. Off ASA due to rectal bleed. Discuss use of triple Rx for hopefully 3 months post JAYCEE implant. Feeling more energetic, no CP nor SOB. Discussed ascending Aortic aneurysm, last checked in 5/2014 with Echo - stable.   HEMODYNAMIC RESULTS:    LVEDP (Pre): 16 mmHg  LVEDP (Post): 18 mmHg  Ejection Fraction: 40%  Global LV Function: moderately depressed  BP: 109/70  HR: 96    Air  rest:  LVEDP: 18    C. ANGIOGRAPHIC RESULTS:    DIAGNOSTIC:  Patient has a right dominant coronary artery.     - Left Main Coronary Artery:  The LM is normal. There is DAREK 3 flow.    - Left Anterior Descending Artery:  The mid LAD has a 50% stenosis. There is DAREK 3 flow. The remaining portion of the vessel has multiple lesions < 50% stenosed. Long stent noted in mid-LAD, 50% ISR.    - Left Circumflex Artery:  The LCX has luminal irregularities. There is DAREK 3 flow.    - Right Coronary Artery:  The mid RCA has a 75% stenosis. There is DAREK 3 flow. The remaining portion of the vessel has luminal irregularities. Appears to be close to distal end of previously placed stent.    - Aortic Root:  The Aortic Root is normal. There is DAREK 3 flow.      D. SUMMARY:    1. Two vessel coronary artery disease.  2. Diastolic dysfunction.  3. - Very difficult case due to tortuosity of the innominate artery, multiple manipulation needed for cannulation of CA.  4. - RCA lesion appears as a large plaque with significant luminal stenois.    E. RECOMMENDATIONS:    1. Continue medical management.  2. Cardiac rehab referral.  3. Weight loss.  4. Follow up with Dr. Familia Tate.  5. Schedule for PCI.  6. - Hydration overnight, eGFR 47.  7. - Dr. Gallegos consulted for PCI of RCA  8. - On chronic warfarin Rx, on hold for now  9. - Start  mg today and daily for total of 5 days as warfarin is resumed post PCI  10. - Start Plavix, load with 300 mg today then 75 mg daily in addition to warfarin.    PCI INTERVENTION:    Proximal RCA:  The lesion was successfully intervened. Post-stenosis of 0% and post-DAREK 3 flow. The vessel was accessed natively. The following items were used: Stent Resolute Rx 4.00x30 (JAYCEE).    C. SUMMARY:    1. Successful PCI/JAYCEE of the proximal RCA.    D. RECOMMENDATIONS:    1. Routine post PCI care.  2. Risk factor reductions.  3. ASA 81mg.  4. Clopidogrel (Plavix) for one year.  5. Weight loss.    Since visit of  "1/22/2015, no CP nor SOB. Been compliant with medications, no problem. Noted venous ulcer in the left leg about a week ago. Dressing with peroxide. Lab from [facility] reviewed, K+ remains 5.5 with stable Cr of 1.78, eGFR 39. Active with rental properties upkeep.     Since visit of 3/27, no new problem, difficulties in getting lab results from [facility]. Reviewed eGFR 39, K+ 5.5, will need to stay with low K+ diet. Problem with venous stasis ulceration in the left leg. Worked with Wound Care for 2 weeks, told to return if problem.     Since visit of 4/23,no new problem, feeling "pretty good". In process of selling rental properties. Active with walking about 4 miles a day, takes about an hour. Legs better with ACE stocking. Review lab, BMP on 5/15/2015 K+ 4.7, stable renal function with eGFR 41, CBC in 1/2015 Hgb 11.8, last Ferritin level in 4/2011.  ECHO, 5/2015, CONCLUSIONS   1 - Moderately enlarged aortic root. measuring 4.1 cm at sinotubular junction.  2 - Concentric hypertrophy. Wall thickness is increased, with the septum and the posterior wall each measuring 1.4 cm across.  3 - Low normal to mildly depressed left ventricular systolic function (EF 50-55%).   4 - Normal left ventricular diastolic function.   5 - Right ventricular enlargement with normal systolic function.   6 - Difficult windows, Optison contrast used for wall motion analysis.   7 - Suggestion for some improvement in LVEF from Echo on 5/7/2014.     Since visit of 5/29/2015, more active, compliant with medications, PT twice weekly for an hour, no problem. Last BMP in 5/2015, K+ 4.7, Cr 1.7, eGFR 41. Under care of Dr. Torrez for venous stasis and ulcer. Uses support hose occasionally due to the hot weather.    Since visit of 9/10/2015, no new problem, less stress, no regular exercise. Had completed phase II Cardiac Rehab. Asked to consider phase III. Home BP is good, 135. Citlali fixes medications, don't skip.    Since visit of 2/8/2016, no new problem. " Undergoing urologic evaluation for kidney mass with biopsies of the bladder, prostate, and the right kidney, no problem with the procedure, awaiting results. ECG shows RBBB, no problem with medications, no regular exercise but considering to start. Decline stress test, no CP nor SOB.     In 12/2016, first concern is chronic fatigue, received 42 radiation Rx, have nocturia 3X nightly which interrupt the sleep, gets only about 5 hours. Will review with upcoming visit with Urologist. Stay active, able to walk 2 blocks with can, also uses free weights 2X weekly, for 15-20 minutes. Have DORA, uses CPAP 100%. For past 2 months had 2 episode of chest pain under emotional stress, last up to 15 minutes, max grade 3/10, have not use any NTG. Chart reviewed - last nuclear stress test in 12/2014. Had JAYCEE placed in 1/2015. Last lipid is excellent, LDL 57, non-HDL 75. Weight stable at around 294 lbs.   Echo, 6/2016 CONCLUSIONS     1 - Mild left ventricular enlargement.     2 - Eccentric hypertrophy.     3 - Moderately depressed left ventricular systolic function (EF 35-40%).     4 - Normal left ventricular diastolic function.     5 - Right ventricular enlargement with normal systolic function.     6 - The estimated PA systolic pressure is greater than 25 mmHg.     7 - Mild tricuspid regurgitation.     8 - Trivial to mild aortic regurgitation.     9 - Suggestion for deterioration in LVEF from Echo in 5/2015.     10 - Difficult windows, Optison contrast used for wall motion analysis.     Carotid US  Consider repeat study in six months.    CONCLUSIONS   There is 40 - 49% right Internal Carotid stenosis.  There is 20 - 39% left Internal Carotid stenosis.    Antegrade flows in the vertebral arteries. Homogenous plaques noted in both ICAs.    In 2/2017, active with rental, feels tired all the time, using CPAP 100%, wake up feeling all right then tired after 2-3 hours. Was using hydrocodone bid for chronic pains, out of medications for 3  weeks, feels more tired off the narcotic. Explained need for regular exercise with muscle strengthening.  Lexiscan - Nuclear Quantitative Functional Analysis:   LVEF: 42 % (normal is 47 - 59)  LVED Volume: 124 ml (normal is 91 - 155)  LVES Volume: 72 ml (normal is 40 - 78)    Impression: ABNORMAL MYOCARDIAL PERFUSION  1. There is moderate intensity fixed defects in the lateral and inferolateral walls of the left ventricle, consistent with myocardial injury. There is trivial francine-injury ischemia.   2. There is abnormal wall motion at rest showing moderate hypokinesis of the inferolateral and inferior walls of the left ventricle.   3. There is resting LV dysfunction with a reduced ejection fraction of 42 %.  (normal is 47 - 59)  4. The ventricular volumes are normal at rest and stress.   5. The extracardiac distribution of radioactivity is normal.   6. When compared to the previous study from 12/15/2014, the inferolateral defects now appears fixed.  7. [facility] SSS=15, SRS=14, and SDS=1, suggest that only 1% of myocardium may be ischemic.    Carotid US - CONCLUSIONS   There is 20 - 39% right Internal Carotid stenosis.  There is 0 - 19% left Internal Carotid stenosis.    Homogenous plaques noted in the ICAs, antegrade flows in the vertebral  arteries.    In 7/2017, here supposedly for HTN but not certain, also planning to do RF nerve ablation next Thursday with Dr. Causey, no surgical clearance requested. BP in PCP office was 112/60. Compliant with medications. Also have started using BiPAP every night for DORA, feel better. Denies any CP nor SOB. Active with property management and HA. ECG shows NSR, RBBB.     Review of Systems   Constitution: Negative for diaphoresis, fever, some weakness, and malaise/fatigue, no night sweats and but weight gain 4 lbs since last visit.   HENT: Negative for headaches, nosebleeds and tinnitus.   Eyes: Negative for visual disturbance.   Cardiovascular: Positive for dyspnea on exertion and  chest pain with emotional stress. Negative claudication, cyanosis, irregular heartbeat, leg swelling, near-syncope, orthopnea, palpitations and paroxysmal nocturnal dyspnia.   Respiratory: Negative for cough, shortness of breath, sleep disturbances due to breathing, snoring and wheezing.   Endocrine: Negative for polydipsia and polyuria.   Hematologic/Lymphatic: Does not bruise/bleed easily.   Skin: Negative for nail changes, color change, flushing, poor wound healing and suspicious lesions. ecchymosis on ASA, and warfarin  Musculoskeletal: Positive for arthritis, back pain and muscle cramps on daily Xanax 1 mg. Negative for falls, gout, joint pain, joint swelling,   Bilateral hip replacements (right 1996, left 2001), right knee arthroscopic procedure 1996.  Have muscle weakness and myalgias in hip and legs  Gastrointestinal: Negative for heartburn, hematemesis, hematochezia, melena and nausea.   Neurological: Negative for disturbances in coordination, excessive daytime sleepiness, dizziness, focal weakness, light-headedness, loss of balance, numbness and vertigo.   Psychiatric/Behavioral: Negative for depression and substance abuse. The patient is nervous/anxious and stressed with rentals. The patient does not have insomnia.        Objective:     Physical Exam   Constitutional: He is oriented to person, place, and time. He appears well-developed and well-nourished. Lots of yarning   HENT:   Head: Normocephalic.   Eyes: Conjunctivae and EOM are normal. Pupils are equal, round, and reactive to light.   Neck: Normal range of motion. Neck supple. No JVD present. No thyromegaly present.   Cardiovascular: regular rate, regular rhythm, soft heart sounds and intact distal pulses. Exam reveals no gallop and no friction rub.   No murmur heard.  No swelling.   Pulmonary/Chest: Effort normal. He has no wheezes. He has no rales. He exhibits no tenderness.   Abdominal: Soft. Bowel sounds are normal. There is no  "tenderness.  Protuberance, waist circumference 57.5 inches increased to 58" , hip 55 inches.   Musculoskeletal: Normal range of motion. He exhibits No tenderness (mild behind the left knee.). He exhibits 1+ edema in both lower extremities to the knee.   Lymphadenopathy:   He has no cervical adenopathy.   Neurological: He is alert and oriented to person, place, and time.   Skin: Skin is warm and dry. No rash noted.   Venous stasis, bilateral with stasis dermatitis, 1+ pitting edema bilateral.        Assessment:       1. Coronary artery disease involving native coronary artery of native heart without angina pectoris     2. Thoracic aortic aneurysm without rupture     3. Bilateral carotid artery disease, 20%-40%         4. Morbid obesity due to excess calories          Plan:      Richard was seen today for cardiac follow up    Diagnoses and associated orders for this visit:    Coronary artery disease, angina presence unspecified, unspecified vessel or lesion type, unspecified whether native or transplanted heart  -     EKG 12-lead    Hypertensive left ventricular hypertrophy, without heart failure  -     2D echo with color flow doppler; Future    Abnormal cardiovascular stress test    Thoracic aortic aneurysm without rupture    CKD (chronic kidney disease) stage 3, GFR 30-59 ml/min, 41    Type 2 diabetes mellitus with stage 3 chronic kidney disease, with long-term current use of insulin    LV dysfunction, EF 40%  -     2D echo with color flow doppler; Future    Morbid obesity due to excess calories    Obstructive sleep apnea syndrome      - CV status stable, continue current Rx, all medications reviewed, patient acknowledge good understanding.  - Check home blood pressure, 2 days weekly, do 2 readings within 5 minutes in AM and PM, keep log for review.  - Weigh twice weekly, try to lose 1-2 lbs per week  - Recommend at least biannual cardiovascular evaluation in view of his significant risk factors.  - Phone review / " [facility]    Venous stasis with ulcers - stable    Hypercoagulable state, Prothrombin mutation    Paroxysmal atrial fibrillation, new onset, 11/2014    Sedentary lifestyle - improved    Patient Active Problem List   Diagnosis    Diabetes mellitus with chronic kidney disease, stage III    MTHFR mutation (methylenetetrahydrofolate reductase)    Sleep apnea, using BiPAP nightly, 1999, last sleep test in 2013    Hypertension associated with diabetes    CAD (coronary artery disease), NSTEMI, JAYCEE x2, LAD & OM1, 8/18/2010, JAYCEE to RCA , 1/15/2015    Hyperlipidemia    Gout    GERD (gastroesophageal reflux disease)    Venous stasis with ulcers    Hypercoagulable state, Prothrombin mutation    Anemia, Hgb 12.1    Colon polyps    Anxiety    Chronic back pain    Morbid obesity    Carotid disease, bilateral    Aortic aneurysm, thoracic, 4.3 cm, 8/2010    GARY (generalized anxiety disorder)    CKD (chronic kidney disease) stage 3, GFR 30-59 ml/min, 41    Abnormal cardiovascular stress test    LV dysfunction, EF 40%    Long term (current) use of anticoagulants    OA (osteoarthritis). 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    Diabetic neuropathy    H/O bariatric surgery, 2008    Osteoarthritis, knee    BPH with obstruction/lower urinary tract symptoms    Elevated PSA    Microscopic hematuria    Leg weakness, bilateral    Proteinuria    Paroxysmal atrial fibrillation, new onset, 11/2014    Fatigue    Left ventricular dysfunction with reduced left ventricular function    Stasis dermatitis of both legs    Hyperkalemia    Type 2 diabetes mellitus with neurologic complication    Type 2 diabetes mellitus with diabetic nephropathy    Hypertensive left ventricular hypertrophy, without heart failure    Prostate cancer    Abdominal wall hematoma    Nocturia more than twice per night    Sleep deprivation    Chronic fatigue     Valium use disorder, mild, onset 2015    Stress at work    Precordial pain    Cellulitis and abscess of toe of left foot    Ulcer of toe of left foot    Peripheral vascular disease    Osteomyelitis of ankle or foot    Chronic sinus complaints    Prostate cancer    Facet arthropathy     Total face-to-face time with the patient was 30 minutes and greater than 50% was spent in counseling and coordination of care. The above assessment and plan have been discussed at length. Labs and procedure over the last 6 months reviewed. Problem List updated. Asked to bring in all active medications / pills bottles with next visit.

## 2017-08-03 ENCOUNTER — LAB VISIT (OUTPATIENT)
Dept: LAB | Facility: HOSPITAL | Age: 72
End: 2017-08-03
Attending: ANESTHESIOLOGY
Payer: MEDICARE

## 2017-08-03 ENCOUNTER — HOSPITAL ENCOUNTER (OUTPATIENT)
Facility: AMBULARY SURGERY CENTER | Age: 72
Discharge: HOME | End: 2017-08-03
Attending: ANESTHESIOLOGY | Admitting: ANESTHESIOLOGY
Payer: MEDICARE

## 2017-08-03 ENCOUNTER — TELEPHONE (OUTPATIENT)
Dept: FAMILY MEDICINE | Facility: CLINIC | Age: 72
End: 2017-08-03

## 2017-08-03 ENCOUNTER — SURGERY (OUTPATIENT)
Age: 72
End: 2017-08-03

## 2017-08-03 DIAGNOSIS — Z79.01 ANTICOAGULATED ON COUMADIN: ICD-10-CM

## 2017-08-03 DIAGNOSIS — M47.819 FACET ARTHROPATHY: Primary | ICD-10-CM

## 2017-08-03 DIAGNOSIS — Z79.01 ANTICOAGULATED ON COUMADIN: Primary | ICD-10-CM

## 2017-08-03 LAB
INR PPP: 1.2
POCT GLUCOSE: 140 MG/DL (ref 70–110)
PROTHROMBIN TIME: 12.7 SEC

## 2017-08-03 PROCEDURE — 36415 COLL VENOUS BLD VENIPUNCTURE: CPT

## 2017-08-03 PROCEDURE — 64635 DESTROY LUMB/SAC FACET JNT: CPT | Mod: LT | Performed by: ANESTHESIOLOGY

## 2017-08-03 PROCEDURE — 64635 DESTROY LUMB/SAC FACET JNT: CPT | Mod: 50,,, | Performed by: ANESTHESIOLOGY

## 2017-08-03 PROCEDURE — 99152 MOD SED SAME PHYS/QHP 5/>YRS: CPT | Mod: ,,, | Performed by: ANESTHESIOLOGY

## 2017-08-03 PROCEDURE — 85610 PROTHROMBIN TIME: CPT

## 2017-08-03 PROCEDURE — 64636 DESTROY L/S FACET JNT ADDL: CPT | Mod: LT | Performed by: ANESTHESIOLOGY

## 2017-08-03 PROCEDURE — 64636 DESTROY L/S FACET JNT ADDL: CPT | Mod: 50,,, | Performed by: ANESTHESIOLOGY

## 2017-08-03 RX ORDER — FENTANYL CITRATE 50 UG/ML
INJECTION, SOLUTION INTRAMUSCULAR; INTRAVENOUS
Status: DISCONTINUED
Start: 2017-08-03 | End: 2017-08-03 | Stop reason: HOSPADM

## 2017-08-03 RX ORDER — DEXAMETHASONE SODIUM PHOSPHATE 10 MG/ML
INJECTION INTRAMUSCULAR; INTRAVENOUS
Status: DISCONTINUED | OUTPATIENT
Start: 2017-08-03 | End: 2017-08-03 | Stop reason: HOSPADM

## 2017-08-03 RX ORDER — FENTANYL CITRATE 50 UG/ML
INJECTION, SOLUTION INTRAMUSCULAR; INTRAVENOUS
Status: DISCONTINUED | OUTPATIENT
Start: 2017-08-03 | End: 2017-08-03 | Stop reason: HOSPADM

## 2017-08-03 RX ORDER — MIDAZOLAM HYDROCHLORIDE 2 MG/2ML
INJECTION, SOLUTION INTRAMUSCULAR; INTRAVENOUS
Status: DISCONTINUED | OUTPATIENT
Start: 2017-08-03 | End: 2017-08-03 | Stop reason: HOSPADM

## 2017-08-03 RX ORDER — BUPIVACAINE HYDROCHLORIDE 2.5 MG/ML
INJECTION, SOLUTION EPIDURAL; INFILTRATION; INTRACAUDAL
Status: DISCONTINUED | OUTPATIENT
Start: 2017-08-03 | End: 2017-08-03 | Stop reason: HOSPADM

## 2017-08-03 RX ORDER — ALPRAZOLAM 1 MG/1
1 TABLET, ORALLY DISINTEGRATING ORAL
Status: DISCONTINUED | OUTPATIENT
Start: 2017-08-03 | End: 2017-08-03 | Stop reason: HOSPADM

## 2017-08-03 RX ORDER — BUPIVACAINE HYDROCHLORIDE 2.5 MG/ML
INJECTION, SOLUTION EPIDURAL; INFILTRATION; INTRACAUDAL
Status: DISPENSED
Start: 2017-08-03 | End: 2017-08-04

## 2017-08-03 RX ORDER — LIDOCAINE HYDROCHLORIDE 10 MG/ML
INJECTION, SOLUTION EPIDURAL; INFILTRATION; INTRACAUDAL; PERINEURAL
Status: DISCONTINUED | OUTPATIENT
Start: 2017-08-03 | End: 2017-08-03 | Stop reason: HOSPADM

## 2017-08-03 RX ORDER — SODIUM CHLORIDE, SODIUM LACTATE, POTASSIUM CHLORIDE, CALCIUM CHLORIDE 600; 310; 30; 20 MG/100ML; MG/100ML; MG/100ML; MG/100ML
INJECTION, SOLUTION INTRAVENOUS ONCE AS NEEDED
Status: COMPLETED | OUTPATIENT
Start: 2017-08-03 | End: 2017-08-03

## 2017-08-03 RX ORDER — LIDOCAINE HYDROCHLORIDE 10 MG/ML
INJECTION, SOLUTION EPIDURAL; INFILTRATION; INTRACAUDAL; PERINEURAL
Status: DISPENSED
Start: 2017-08-03 | End: 2017-08-04

## 2017-08-03 RX ORDER — LIDOCAINE HYDROCHLORIDE 20 MG/ML
INJECTION, SOLUTION EPIDURAL; INFILTRATION; INTRACAUDAL; PERINEURAL
Status: DISPENSED
Start: 2017-08-03 | End: 2017-08-04

## 2017-08-03 RX ORDER — MIDAZOLAM HYDROCHLORIDE 1 MG/ML
INJECTION INTRAMUSCULAR; INTRAVENOUS
Status: DISCONTINUED
Start: 2017-08-03 | End: 2017-08-03 | Stop reason: HOSPADM

## 2017-08-03 RX ORDER — LIDOCAINE HYDROCHLORIDE 20 MG/ML
INJECTION, SOLUTION EPIDURAL; INFILTRATION; INTRACAUDAL; PERINEURAL
Status: DISCONTINUED | OUTPATIENT
Start: 2017-08-03 | End: 2017-08-03 | Stop reason: HOSPADM

## 2017-08-03 RX ORDER — DEXAMETHASONE SODIUM PHOSPHATE 10 MG/ML
INJECTION INTRAMUSCULAR; INTRAVENOUS
Status: DISPENSED
Start: 2017-08-03 | End: 2017-08-04

## 2017-08-03 RX ADMIN — FENTANYL CITRATE 50 MCG: 50 INJECTION, SOLUTION INTRAMUSCULAR; INTRAVENOUS at 10:08

## 2017-08-03 RX ADMIN — BUPIVACAINE HYDROCHLORIDE 6 ML: 2.5 INJECTION, SOLUTION EPIDURAL; INFILTRATION; INTRACAUDAL at 10:08

## 2017-08-03 RX ADMIN — MIDAZOLAM HYDROCHLORIDE 1 MG: 2 INJECTION, SOLUTION INTRAMUSCULAR; INTRAVENOUS at 10:08

## 2017-08-03 RX ADMIN — LIDOCAINE HYDROCHLORIDE 6 ML: 20 INJECTION, SOLUTION EPIDURAL; INFILTRATION; INTRACAUDAL; PERINEURAL at 10:08

## 2017-08-03 RX ADMIN — DEXAMETHASONE SODIUM PHOSPHATE 10 MG: 10 INJECTION INTRAMUSCULAR; INTRAVENOUS at 10:08

## 2017-08-03 RX ADMIN — LIDOCAINE HYDROCHLORIDE 10 ML: 10 INJECTION, SOLUTION EPIDURAL; INFILTRATION; INTRACAUDAL; PERINEURAL at 10:08

## 2017-08-03 RX ADMIN — SODIUM CHLORIDE, SODIUM LACTATE, POTASSIUM CHLORIDE, CALCIUM CHLORIDE: 600; 310; 30; 20 INJECTION, SOLUTION INTRAVENOUS at 09:08

## 2017-08-03 NOTE — OP NOTE
PROCEDURE DATE: 8/3/2017    PROCEDURE:  Radiofrequency ablation of the L3,4,5 medial branch nerves on the bilateral-side utilizing fluoroscopy    DIAGNOSIS:  Lumbar spondylosis without myelopathy  Post op Diagnosis: Same    PHYSICIAN: Nile Causey MD    MEDICATIONS INJECTED:  From a mixture of 6ml of 0.25% bupivicaine and 10mg of dexamethasone,  1ml of this solution was injected at each level.    LOCAL ANESTHETIC USED: Lidocaine 1%, 2 ml given at each site.    SEDATION MEDICATIONS: RN IV sedation    ESTIMATED BLOOD LOSS:  NOne    COMPLICATIONS:  None    TECHNIQUE:  A time out was taken to identify patient and procedure side prior to starting the procedure. Laying in a prone position, the patient was prepped and draped in the usual sterile fashion using ChloraPrep and sterile towels.  The levels were determined under fluoroscopic guidance and then marked.  Local anesthetic was given by raising a wheal at the skin over each site and then infiltrated approximately 2cm deeper.  A 20-gauge  100 mm RF needle was introduced to the anatomic location of the bilateral L3,4,5 medial branch nerves. SMotor stimulation up to 2 Volts at each level confirmed no motor nerve involvement.  Impedance was less than 800 ohms at each level.  1ml of 2% lidocaine was instilled prior to lesioning.  Ablation was performed per level utilizing radiofrequency generator 80°C for 60 seconds.  Needles were then rotated 90° and a second lesion was performed.  The above noted medication was then injected slowly. The patient tolerated the procedure well.     The patient was monitored after the procedure.  Patient was given post procedure and discharge instructions to follow at home.  The patient was discharged in a stable condition

## 2017-08-03 NOTE — H&P (VIEW-ONLY)
Patient ID: Richard Bustos is a 69 y.o. male who presents for follow-up of Follow-up  For CAD, stress related chest pains, chronic fatigue, said to be referred by Ms. Cardenas, [facility] for HTN  PCP: Dr. Ramirez, see every 4 months, does labs through [facility]  Hematologist: Dr. Quezada  Urologist: Dr. Moss  Podiatrist: Dr. Nunes, [place]  Wound care: Dr. Torrez  Pain: Dr. 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 Suffered a NSTEMI in 8/2010, catheterization showed SUMMARY:   1. Three vessel coronary artery disease.   2. Successful PCI. Involved 2 JAYCEE in the LAD and OM1.    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 Off diet at times, had underwent gastri bypass in 2008.    Last Echo in 2010 showed CONCLUSIONS   1 - Mild left ventricular enlargement.   2 - Normal left ventricular function (EF 58%).   3 - Diastolic dysfunction.   4 - Biatrial enlargement.   5 - Mildly to moderately enlarged ascending aorta.    Last carotid US: 9/13/2010  Bilateral 20%-40% narrowing.  No symptoms.    Coronary Artery Disease  Presents for follow-up visit. Pertinent negatives include no chest pain, dizziness, leg swelling, muscle weakness, palpitations, shortness of breath or weight gain. Risk factors include hyperlipidemia. The symptoms have been resolved. Compliance with diet is variable. Compliance with exercise is good. Compliance with medications is good.   Hyperlipidemia  Pertinent negatives include no chest pain, focal weakness, myalgias or shortness of  "breath.     EKG shows sinus bradycardia, rate 45, today NSR, 75, VPC, and nonspecific STTW abnormalities.     Since visit of 7/2012, have lots of stress with tenants, BP noted to be elevated, log reviewed 153-174/, HR 52-74. Had cardiac testing -   ECHO CONCLUSIONS 7/2012   1 - Mild left ventricular enlargement.   2 - Mildly to moderately depressed left ventricular function (EF 40%).   3 - Eccentric hypertrophy.   4 - Mildly to moderately enlarged ascending aorta.   5 - Mild left atrial enlargement.   6 - Normal diastolic function.   7 - Mild aortic regurgitation.   8 - Suggestion for inferoposterior hypokinesia.   9 - Compare to 8/17/2010, there is increase in LVH with decrease in   LVEF from 58%, and new inferoposterior hypokinesia. The Aortic dimensions   have not changed.    Carotid US, 7/2012  IMPRESSION   Findings consistent with less than 50% diameter stenosis of proximal   internal carotid arteries by NASCAET criteria. Flow in the vertebral   arteries appears antegrade bilaterally.    Since visit of 1/23, still with occasional angina, average once a week, lasting about a minute, "light" grade 3-4/10.  Workup:  ECHO, 2/5/20163, CONCLUSIONS   1 - Mild left ventricular enlargement.   2 - Mildly to moderately depressed left ventricular function (EF 40%).   3 - Normal diastolic function.   4 - Inferoposterior hypokinesia consistent with ischemic disease..   5 - Moderately enlarged ascending aorta. 4.4 cm - stable   6 - No significant change from study of 7/17/2012.    Lexiscan, 2/5/2013  Nuclear Quantitative Functional Analysis:   LVEF: 34 % (normal is 47 - 59)   LVED Volume: 131 ml (normal is 91 - 155)   LVES Volume: 87 ml (normal is 40 - 78)   Impression: ABNORMAL MYOCARDIAL PERFUSION   1. There is evidence for mild to moderate myocardial ischemia in the   lateral apical wall of the left ventricle with associated stress induced   LV cavity dilatation with underlying injury present.   2. There is moderate " intensity fixed defect in the inferolateral wall of   the left ventricle, consistent with myocardial injury. There is trivial   francine-injury ischemia.   3. There is abnormal wall motion at rest showing akinesis of the lateral   wall of the left ventricle, moderate global hypokinesis of the left   ventricle. dyskinesis of the inferolateral wall of the left ventricle.   4. There is resting LV dysfunction with a reduced ejection fraction of 34   %. (normal is 47 - 59)   5. The left ventricular volume is mildly increased at rest.   6. The extracardiac distribution of radioactivity is normal.  7. [facility] SSS =15, SRS =10, SDS =5, suggest 6.25% of myocardium may be   ischemic.    Since visit of 2/8, noticed some bilateral leg weakness, post bilateral THR, also occasional charley horse at night, mostly right sided. Today had mild chest pain, grade 2/10, while driving here. Claims to have not heard from [facility], INR on 2/18 was 1.8. Question about Plavix answered. Discussed with daughter, Citlali, over the telephone.     Since visit 2/22, no new problem. Discussed aortic aneurysm, will need at least annual imaging, do not recall the last time. Have claustrophobia requesting Xanax. No problem with the medications, stays very active. Weight reviewed was below 290 lbs in 12/2011.   Apparently likes Arcadio rincon!     Since visit of 3/16, had MRA done with use of Xanax and hydrocodone,   Result - Max ascending aorta 4.3 cm stable.  Tolerating medications without problem, using BiPAP nightly, still lot of stress with [employer]ental [employer]. Discuss need for exercise program with weigh reduction of 10%. New problem include left leg weakness with localized pain behind the knee. For about 2 weeks, been dancing with new woman.    Since visit of 4/11/2013, hospitalized 2 weeks ago at [facility] for large hematoma due to use of Lovenox as bridge for oral surgery. Coumadin on hold, seems to be getting smaller and  softer. Denies any heart symptoms or CP nor SOB. No problem with taking medications and using CPAP. Slowed down due to leg pain and fatigue. Feels good in the AM.    Since visit of 4/9/2014, complain of leg weakness, noted since 2/2013. Denies any CP nor SOB. Miss 2-3 doses of medications monthly. No other problem with medications. Awaken all night due to dog, sleep 10-4. Feels tired in the morning despite using CPAP. Have not had much blood work done. ECG shows AF rate 67, RBBB.  ECHO in 5/2014 CONCLUSIONS   1 - Enlarged aortic root. measuring 3.9 cm at sinotubular junction.  2 - Biatrial enlargement.   3 - Eccentric hypertrophy.   4 - Mildly to moderately depressed left ventricular systolic function (EF 40-45%).   5 - Normal left ventricular diastolic function.   6 - Right ventricular enlargement with normal systolic function.   7 - No significant change from echo on 2/5/2013..   8 - Difficult apical window, Optison contrast used for wall motion analysis..     Since visit of 11/21, no new problem. Some concern of HR down to 40 after exercise. Noted easy fatigue but remains quite sedentary. Discussed need for Coreg titration for LV dysfunction. Labs reviewed, CKD eith eGFR of 47, mild anemia Hgb 12.1, , A1C 4.7, and proteinuria.   Cardiac workup:  Carotid US, 12/2014 - CONCLUSIONS   There is 0 - 19% right Internal Carotid stenosis.  There is 0 - 19% left Internal Carotid stenosis.    Clean vessels    Lexiscan Nuclear Quantitative Functional Analysis:   LVEF: 38 % (normal is 47 - 59)  LVED Volume: 172 ml (normal is 91 - 155)  LVES Volume: 107 ml (normal is 40 - 78)    Impression: ABNORMAL MYOCARDIAL PERFUSION  1. There is evidence for mild to moderate myocardial ischemia in the inferior and lateral walls of the left ventricle with associated stress induced LV cavity dilatation with underlying injury present.   2. There is moderate to severe intensity fixed defect in the inferolateral wall of the left  ventricle, consistent with myocardial injury.   3. There is abnormal wall motion at rest showing moderate global hypokinesis of the left ventricle. severe hypokinesis of the inferior and septal walls of the left ventricle.   4. There is resting LV dysfunction with a reduced ejection fraction of 38 %. (normal is 47 - 59)  5. The left ventricular volume is moderately increased at rest.   6. The extracardiac distribution of radioactivity is normal.   7. When compared to the previous study from 02/05/2013, no significant changes noted.  8. [facility] SSS=15, SRS=8, and SDS=7, suggest that 8.7% of myocardium may be ischemic.    Holter, 12/2014:  PREDOMINANT RHYTHM  1. Sinus arrhythmia with heart rates varying between 41 and 110 bpm with an average of 64 bpm.     VENTRICULAR ARRHYTHMIAS  1. There were frequent mostly monomorphic PVCs totalling 1297 and averaging 54 per hour. There was 1 couplet.    2. 1.6% of complexes.    3. There were no episodes of ventricular tachycardia.    SUPRA VENTRICULAR ARRHYTHMIAS  1. There were rare PACs totalling 108 and averaging 4 per hour.     2. There were no episodes of sustained supraventricular tachycardia.    SINUS NODE FUNCTION  1. There was no evidence of high grade SA sang block.     AV CONDUCTION  1. There was no evidence of high grade AV block.     DIARY  1. The diary was not returned    MISCELLANEOUS  1. There were persistent hookup related artifacts. Overall, the study was of good quality.     2. This was a tape of adequate length (24 hrs).     Since visit of 12/23/2014, underwent C with PCI of RCA. Off ASA due to rectal bleed. Discuss use of triple Rx for hopefully 3 months post JAYCEE implant. Feeling more energetic, no CP nor SOB. Discussed ascending Aortic aneurysm, last checked in 5/2014 with Echo - stable.   HEMODYNAMIC RESULTS:    LVEDP (Pre): 16 mmHg  LVEDP (Post): 18 mmHg  Ejection Fraction: 40%  Global LV Function: moderately depressed  BP: 109/70  HR: 96    Air  rest:  LVEDP: 18    C. ANGIOGRAPHIC RESULTS:    DIAGNOSTIC:  Patient has a right dominant coronary artery.     - Left Main Coronary Artery:  The LM is normal. There is DAREK 3 flow.    - Left Anterior Descending Artery:  The mid LAD has a 50% stenosis. There is DAREK 3 flow. The remaining portion of the vessel has multiple lesions < 50% stenosed. Long stent noted in mid-LAD, 50% ISR.    - Left Circumflex Artery:  The LCX has luminal irregularities. There is DAREK 3 flow.    - Right Coronary Artery:  The mid RCA has a 75% stenosis. There is DAREK 3 flow. The remaining portion of the vessel has luminal irregularities. Appears to be close to distal end of previously placed stent.    - Aortic Root:  The Aortic Root is normal. There is DAREK 3 flow.      D. SUMMARY:    1. Two vessel coronary artery disease.  2. Diastolic dysfunction.  3. - Very difficult case due to tortuosity of the innominate artery, multiple manipulation needed for cannulation of CA.  4. - RCA lesion appears as a large plaque with significant luminal stenois.    E. RECOMMENDATIONS:    1. Continue medical management.  2. Cardiac rehab referral.  3. Weight loss.  4. Follow up with Dr. Familia Tate.  5. Schedule for PCI.  6. - Hydration overnight, eGFR 47.  7. - Dr. Gallegos consulted for PCI of RCA  8. - On chronic warfarin Rx, on hold for now  9. - Start  mg today and daily for total of 5 days as warfarin is resumed post PCI  10. - Start Plavix, load with 300 mg today then 75 mg daily in addition to warfarin.    PCI INTERVENTION:    Proximal RCA:  The lesion was successfully intervened. Post-stenosis of 0% and post-DAREK 3 flow. The vessel was accessed natively. The following items were used: Stent Resolute Rx 4.00x30 (JAYCEE).    C. SUMMARY:    1. Successful PCI/JAYCEE of the proximal RCA.    D. RECOMMENDATIONS:    1. Routine post PCI care.  2. Risk factor reductions.  3. ASA 81mg.  4. Clopidogrel (Plavix) for one year.  5. Weight loss.    Since visit of  "1/22/2015, no CP nor SOB. Been compliant with medications, no problem. Noted venous ulcer in the left leg about a week ago. Dressing with peroxide. Lab from [facility] reviewed, K+ remains 5.5 with stable Cr of 1.78, eGFR 39. Active with rental properties upkeep.     Since visit of 3/27, no new problem, difficulties in getting lab results from [facility]. Reviewed eGFR 39, K+ 5.5, will need to stay with low K+ diet. Problem with venous stasis ulceration in the left leg. Worked with Wound Care for 2 weeks, told to return if problem.     Since visit of 4/23,no new problem, feeling "pretty good". In process of selling rental properties. Active with walking about 4 miles a day, takes about an hour. Legs better with ACE stocking. Review lab, BMP on 5/15/2015 K+ 4.7, stable renal function with eGFR 41, CBC in 1/2015 Hgb 11.8, last Ferritin level in 4/2011.  ECHO, 5/2015, CONCLUSIONS   1 - Moderately enlarged aortic root. measuring 4.1 cm at sinotubular junction.  2 - Concentric hypertrophy. Wall thickness is increased, with the septum and the posterior wall each measuring 1.4 cm across.  3 - Low normal to mildly depressed left ventricular systolic function (EF 50-55%).   4 - Normal left ventricular diastolic function.   5 - Right ventricular enlargement with normal systolic function.   6 - Difficult windows, Optison contrast used for wall motion analysis.   7 - Suggestion for some improvement in LVEF from Echo on 5/7/2014.     Since visit of 5/29/2015, more active, compliant with medications, PT twice weekly for an hour, no problem. Last BMP in 5/2015, K+ 4.7, Cr 1.7, eGFR 41. Under care of Dr. Torrez for venous stasis and ulcer. Uses support hose occasionally due to the hot weather.    Since visit of 9/10/2015, no new problem, less stress, no regular exercise. Had completed phase II Cardiac Rehab. Asked to consider phase III. Home BP is good, 135. Citlali fixes medications, don't skip.    Since visit of 2/8/2016, no new problem. " Undergoing urologic evaluation for kidney mass with biopsies of the bladder, prostate, and the right kidney, no problem with the procedure, awaiting results. ECG shows RBBB, no problem with medications, no regular exercise but considering to start. Decline stress test, no CP nor SOB.     In 12/2016, first concern is chronic fatigue, received 42 radiation Rx, have nocturia 3X nightly which interrupt the sleep, gets only about 5 hours. Will review with upcoming visit with Urologist. Stay active, able to walk 2 blocks with can, also uses free weights 2X weekly, for 15-20 minutes. Have DORA, uses CPAP 100%. For past 2 months had 2 episode of chest pain under emotional stress, last up to 15 minutes, max grade 3/10, have not use any NTG. Chart reviewed - last nuclear stress test in 12/2014. Had JAYCEE placed in 1/2015. Last lipid is excellent, LDL 57, non-HDL 75. Weight stable at around 294 lbs.   Echo, 6/2016 CONCLUSIONS     1 - Mild left ventricular enlargement.     2 - Eccentric hypertrophy.     3 - Moderately depressed left ventricular systolic function (EF 35-40%).     4 - Normal left ventricular diastolic function.     5 - Right ventricular enlargement with normal systolic function.     6 - The estimated PA systolic pressure is greater than 25 mmHg.     7 - Mild tricuspid regurgitation.     8 - Trivial to mild aortic regurgitation.     9 - Suggestion for deterioration in LVEF from Echo in 5/2015.     10 - Difficult windows, Optison contrast used for wall motion analysis.     Carotid US  Consider repeat study in six months.    CONCLUSIONS   There is 40 - 49% right Internal Carotid stenosis.  There is 20 - 39% left Internal Carotid stenosis.    Antegrade flows in the vertebral arteries. Homogenous plaques noted in both ICAs.    In 2/2017, active with rental, feels tired all the time, using CPAP 100%, wake up feeling all right then tired after 2-3 hours. Was using hydrocodone bid for chronic pains, out of medications for 3  weeks, feels more tired off the narcotic. Explained need for regular exercise with muscle strengthening.  Lexiscan - Nuclear Quantitative Functional Analysis:   LVEF: 42 % (normal is 47 - 59)  LVED Volume: 124 ml (normal is 91 - 155)  LVES Volume: 72 ml (normal is 40 - 78)    Impression: ABNORMAL MYOCARDIAL PERFUSION  1. There is moderate intensity fixed defects in the lateral and inferolateral walls of the left ventricle, consistent with myocardial injury. There is trivial francine-injury ischemia.   2. There is abnormal wall motion at rest showing moderate hypokinesis of the inferolateral and inferior walls of the left ventricle.   3. There is resting LV dysfunction with a reduced ejection fraction of 42 %.  (normal is 47 - 59)  4. The ventricular volumes are normal at rest and stress.   5. The extracardiac distribution of radioactivity is normal.   6. When compared to the previous study from 12/15/2014, the inferolateral defects now appears fixed.  7. [facility] SSS=15, SRS=14, and SDS=1, suggest that only 1% of myocardium may be ischemic.    Carotid US - CONCLUSIONS   There is 20 - 39% right Internal Carotid stenosis.  There is 0 - 19% left Internal Carotid stenosis.    Homogenous plaques noted in the ICAs, antegrade flows in the vertebral  arteries.    In 7/2017, here supposedly for HTN but not certain, also planning to do RF nerve ablation next Thursday with Dr. Causey, no surgical clearance requested. BP in PCP office was 112/60. Compliant with medications. Also have started using BiPAP every night for DORA, feel better. Denies any CP nor SOB. Active with property management and HA. ECG shows NSR, RBBB.     Review of Systems   Constitution: Negative for diaphoresis, fever, some weakness, and malaise/fatigue, no night sweats and but weight gain 4 lbs since last visit.   HENT: Negative for headaches, nosebleeds and tinnitus.   Eyes: Negative for visual disturbance.   Cardiovascular: Positive for dyspnea on exertion and  chest pain with emotional stress. Negative claudication, cyanosis, irregular heartbeat, leg swelling, near-syncope, orthopnea, palpitations and paroxysmal nocturnal dyspnia.   Respiratory: Negative for cough, shortness of breath, sleep disturbances due to breathing, snoring and wheezing.   Endocrine: Negative for polydipsia and polyuria.   Hematologic/Lymphatic: Does not bruise/bleed easily.   Skin: Negative for nail changes, color change, flushing, poor wound healing and suspicious lesions. ecchymosis on ASA, and warfarin  Musculoskeletal: Positive for arthritis, back pain and muscle cramps on daily Xanax 1 mg. Negative for falls, gout, joint pain, joint swelling,   Bilateral hip replacements (right 1996, left 2001), right knee arthroscopic procedure 1996.  Have muscle weakness and myalgias in hip and legs  Gastrointestinal: Negative for heartburn, hematemesis, hematochezia, melena and nausea.   Neurological: Negative for disturbances in coordination, excessive daytime sleepiness, dizziness, focal weakness, light-headedness, loss of balance, numbness and vertigo.   Psychiatric/Behavioral: Negative for depression and substance abuse. The patient is nervous/anxious and stressed with rentals. The patient does not have insomnia.        Objective:     Physical Exam   Constitutional: He is oriented to person, place, and time. He appears well-developed and well-nourished. Lots of yarning   HENT:   Head: Normocephalic.   Eyes: Conjunctivae and EOM are normal. Pupils are equal, round, and reactive to light.   Neck: Normal range of motion. Neck supple. No JVD present. No thyromegaly present.   Cardiovascular: regular rate, regular rhythm, soft heart sounds and intact distal pulses. Exam reveals no gallop and no friction rub.   No murmur heard.  No swelling.   Pulmonary/Chest: Effort normal. He has no wheezes. He has no rales. He exhibits no tenderness.   Abdominal: Soft. Bowel sounds are normal. There is no  "tenderness.  Protuberance, waist circumference 57.5 inches increased to 58" , hip 55 inches.   Musculoskeletal: Normal range of motion. He exhibits No tenderness (mild behind the left knee.). He exhibits 1+ edema in both lower extremities to the knee.   Lymphadenopathy:   He has no cervical adenopathy.   Neurological: He is alert and oriented to person, place, and time.   Skin: Skin is warm and dry. No rash noted.   Venous stasis, bilateral with stasis dermatitis, 1+ pitting edema bilateral.        Assessment:       1. Coronary artery disease involving native coronary artery of native heart without angina pectoris     2. Thoracic aortic aneurysm without rupture     3. Bilateral carotid artery disease, 20%-40%         4. Morbid obesity due to excess calories          Plan:      Richard was seen today for cardiac follow up    Diagnoses and associated orders for this visit:    Coronary artery disease, angina presence unspecified, unspecified vessel or lesion type, unspecified whether native or transplanted heart  -     EKG 12-lead    Hypertensive left ventricular hypertrophy, without heart failure  -     2D echo with color flow doppler; Future    Abnormal cardiovascular stress test    Thoracic aortic aneurysm without rupture    CKD (chronic kidney disease) stage 3, GFR 30-59 ml/min, 41    Type 2 diabetes mellitus with stage 3 chronic kidney disease, with long-term current use of insulin    LV dysfunction, EF 40%  -     2D echo with color flow doppler; Future    Morbid obesity due to excess calories    Obstructive sleep apnea syndrome      - CV status stable, continue current Rx, all medications reviewed, patient acknowledge good understanding.  - Check home blood pressure, 2 days weekly, do 2 readings within 5 minutes in AM and PM, keep log for review.  - Weigh twice weekly, try to lose 1-2 lbs per week  - Recommend at least biannual cardiovascular evaluation in view of his significant risk factors.  - Phone review / " [facility]    Venous stasis with ulcers - stable    Hypercoagulable state, Prothrombin mutation    Paroxysmal atrial fibrillation, new onset, 11/2014    Sedentary lifestyle - improved    Patient Active Problem List   Diagnosis    Diabetes mellitus with chronic kidney disease, stage III    MTHFR mutation (methylenetetrahydrofolate reductase)    Sleep apnea, using BiPAP nightly, 1999, last sleep test in 2013    Hypertension associated with diabetes    CAD (coronary artery disease), NSTEMI, JAYCEE x2, LAD & OM1, 8/18/2010, JAYCEE to RCA , 1/15/2015    Hyperlipidemia    Gout    GERD (gastroesophageal reflux disease)    Venous stasis with ulcers    Hypercoagulable state, Prothrombin mutation    Anemia, Hgb 12.1    Colon polyps    Anxiety    Chronic back pain    Morbid obesity    Carotid disease, bilateral    Aortic aneurysm, thoracic, 4.3 cm, 8/2010    GARY (generalized anxiety disorder)    CKD (chronic kidney disease) stage 3, GFR 30-59 ml/min, 41    Abnormal cardiovascular stress test    LV dysfunction, EF 40%    Long term (current) use of anticoagulants    OA (osteoarthritis). 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    Diabetic neuropathy    H/O bariatric surgery, 2008    Osteoarthritis, knee    BPH with obstruction/lower urinary tract symptoms    Elevated PSA    Microscopic hematuria    Leg weakness, bilateral    Proteinuria    Paroxysmal atrial fibrillation, new onset, 11/2014    Fatigue    Left ventricular dysfunction with reduced left ventricular function    Stasis dermatitis of both legs    Hyperkalemia    Type 2 diabetes mellitus with neurologic complication    Type 2 diabetes mellitus with diabetic nephropathy    Hypertensive left ventricular hypertrophy, without heart failure    Prostate cancer    Abdominal wall hematoma    Nocturia more than twice per night    Sleep deprivation    Chronic fatigue     Valium use disorder, mild, onset 2015    Stress at work    Precordial pain    Cellulitis and abscess of toe of left foot    Ulcer of toe of left foot    Peripheral vascular disease    Osteomyelitis of ankle or foot    Chronic sinus complaints    Prostate cancer    Facet arthropathy     Total face-to-face time with the patient was 30 minutes and greater than 50% was spent in counseling and coordination of care. The above assessment and plan have been discussed at length. Labs and procedure over the last 6 months reviewed. Problem List updated. Asked to bring in all active medications / pills bottles with next visit.

## 2017-08-03 NOTE — TELEPHONE ENCOUNTER
----- Message from Belkis Nuñez sent at 8/3/2017  4:13 PM CDT -----  Contact: Daughter - Citlali  Patient needs his pain medication, Norco and his Xanax 1 mg refilled.       [id number] - [place] - [address] AT [facility] [address] [place] [address]  Phone: [phone] Fax: [phone]    Please call his daughter back at [phone].     Thank you

## 2017-08-03 NOTE — PLAN OF CARE
Patient able to lift each leg and hold in air prior to standing. Patient sent home with reusable ice pack. Patient brought to car via wheelchair. He was sent home with his cane. He ate 3 packs of danielle crackers while in recovery.

## 2017-08-03 NOTE — DISCHARGE SUMMARY
[facility]  Discharge Note  Short Stay    Admit Date: 8/3/2017    Discharge Date and Time: 8/3/2017    Attending Physician: Nile Causey MD     Discharge Provider: Nile Causey    Diagnoses:  Active Hospital Problems    Diagnosis  POA    *Facet arthropathy [M12.88]  Yes      Resolved Hospital Problems    Diagnosis Date Resolved POA   No resolved problems to display.       Hospital Course: Lumbar MB RFA  Discharged Condition: Good    Final Diagnoses:   Active Hospital Problems    Diagnosis  POA    *Facet arthropathy [M12.88]  Yes      Resolved Hospital Problems    Diagnosis Date Resolved POA   No resolved problems to display.       Disposition: Home or Self Care    Follow up/Patient Instructions:    Medications:  Reconciled Home Medications:   Current Discharge Medication List      CONTINUE these medications which have NOT CHANGED    Details   aspirin (ECOTRIN) 81 MG EC tablet Take 81 mg by mouth once daily.      calcitRIOL (ROCALTROL) 0.5 MCG Cap       calcium carbonate (TUMS ULTRA) 400 mg calcium (1,000 mg) Chew Take 1 tablet (400 mg total) by mouth once daily at 6am.  Qty: 30 each, Refills: 11      carvedilol (COREG) 25 MG tablet Take 1 tablet (25 mg total) by mouth 2 (two) times daily with meals.  Qty: 180 tablet, Refills: 3    Comments: **Patient requests 90 days supply**  Associated Diagnoses: Essential hypertension      clopidogrel (PLAVIX) 75 mg tablet Take 1 tablet (75 mg total) by mouth once daily.  Qty: 90 tablet, Refills: 11      ferrous sulfate 325 mg (65 mg iron) Tab tablet TAKE 1 TABLET BY MOUTH TWICE DAILY  Qty: 180 tablet, Refills: 3    Associated Diagnoses: Iron deficiency anemia      FOLIC ACID/MULTIVIT-MIN/LUTEIN (CENTRUM SILVER ORAL) Take 1 tablet by mouth once daily.      hydrocodone-acetaminophen 7.5-325mg (NORCO) 7.5-325 mg per tablet Take 1 tablet by mouth every 6 (six) hours as needed for Pain.  Qty: 90 tablet, Refills: 0    Associated Diagnoses: Chronic low back pain, unspecified  back pain laterality, with sciatica presence unspecified      L-METHYL-B6-B12 3-35-2 mg Tab TAKE 1 TABLET BY MOUTH TWICE DAILY  Qty: 180 tablet, Refills: 3      MAGOX 400 mg tablet TAKE 1 TABLET(400 MG) BY MOUTH EVERY DAY  Qty: 90 tablet, Refills: 3    Associated Diagnoses: Essential hypertension      mirabegron (MYRBETRIQ) 50 mg Tb24 Take 1 tablet (50 mg total) by mouth once daily.  Qty: 30 tablet, Refills: 11      !! omeprazole (PRILOSEC) 20 MG capsule TAKE 1 CAPSULE(20 MG) BY MOUTH TWICE DAILY  Qty: 180 capsule, Refills: 0    Associated Diagnoses: Gastroesophageal reflux disease, esophagitis presence not specified      ranitidine (ZANTAC) 150 MG tablet TAKE 1 TABLET(150 MG) BY MOUTH TWICE DAILY  Qty: 180 tablet, Refills: 3    Associated Diagnoses: Gastroesophageal reflux disease, esophagitis presence not specified      vit C-vit E-lutein-min-om-3 (OCUVITE) 559-97-8-150 mg-unit-mg-mg Cap Take 1 capsule by mouth once daily.      alendronate-vitamin D3 (FOSAMAX PLUS D) 70 mg- 2,800 unit per tablet Take 1 tablet by mouth every 7 days.  Qty: 4 tablet, Refills: 6      allopurinol (ZYLOPRIM) 100 MG tablet TAKE 1 TABLET(100 MG) BY MOUTH EVERY DAY  Qty: 90 tablet, Refills: 3    Associated Diagnoses: Acute gout of foot, unspecified cause, unspecified laterality      alprazolam (XANAX) 1 MG tablet Take 1 tablet (1 mg total) by mouth every evening.  Qty: 30 tablet, Refills: 0    Associated Diagnoses: Anxiety      atorvastatin (LIPITOR) 80 MG tablet TAKE 1 TABLET(80 MG) BY MOUTH EVERY DAY  Qty: 90 tablet, Refills: 3    Associated Diagnoses: Hyperlipidemia      ciclopirox (PENLAC) 8 % Soln Apply topically nightly.  Qty: 6.6 mL, Refills: 11      ciclopirox 0.77 % Gel Apply topically 2 (two) times daily.  Qty: 100 g, Refills: 3      clotrimazole-betamethasone 1-0.05% (LOTRISONE) cream Apply topically 2 (two) times daily. To head of penis  Qty: 1 Tube, Refills: 2      ergocalciferol (ERGOCALCIFEROL) 50,000 unit Cap TK 1 C PO Q  WEEK  Refills: 0      escitalopram oxalate (LEXAPRO) 10 MG tablet Take 1/2 tablet (5 mg) daily for first 7 days.  Thereafter take one tablet (10 mg) daily.  Qty: 30 tablet, Refills: 11      fluticasone (FLONASE) 50 mcg/actuation nasal spray 2 sprays by Each Nare route once daily.  Qty: 1 Bottle, Refills: 11    Associated Diagnoses: Chronic sinus complaints      furosemide (LASIX) 40 MG tablet TAKE 1 TABLET BY MOUTH DAILY AS NEEDED  Qty: 90 tablet, Refills: 3    Associated Diagnoses: Coronary artery disease involving native coronary artery of native heart without angina pectoris      lisinopril (PRINIVIL,ZESTRIL) 40 MG tablet Take 40 mg by mouth every evening.       mupirocin (BACTROBAN) 2 % ointment Apply topically 2 (two) times daily.  Qty: 30 g, Refills: 1    Associated Diagnoses: Abrasion      nitroGLYCERIN 0.4 MG/DOSE TL SPRY (NITROLINGUAL) 400 mcg/spray spray PLACE 1 SPRAY UNDER THE TONGUE EVERY 5 MINUTES AS NEEDED FOR CHEST PAIN  Qty: 4.9 g, Refills: 3      !! omeprazole (PRILOSEC) 20 MG capsule Take 1 capsule (20 mg total) by mouth once daily.  Qty: 30 capsule, Refills: 3      salicylic acid (SALACYN) 6 % Crea Apply 1 application topically 2 (two) times daily.  Qty: 454 g, Refills: 11      warfarin (COUMADIN) 5 MG tablet TAKE 1 TABLET BY MOUTH EVERY DAY  Qty: 90 tablet, Refills: 3    Associated Diagnoses: Paroxysmal atrial fibrillation; MTHFR mutation (methylenetetrahydrofolate reductase)       !! - Potential duplicate medications found. Please discuss with provider.          Discharge Procedure Orders  Diet general     Activity as tolerated     Call MD for:  temperature >100.4     Call MD for:  persistent nausea and vomiting or diarrhea     Call MD for:  severe uncontrolled pain     Call MD for:  redness, tenderness, or signs of infection (pain, swelling, redness, odor or green/yellow discharge around incision site)     Call MD for:  difficulty breathing or increased cough     Call MD for:  severe persistent  headache          Follow up with MD in 2-3 weeks    Discharge Procedure Orders (must include Diet, Follow-up, Activity):    Discharge Procedure Orders (must include Diet, Follow-up, Activity)  Diet general     Activity as tolerated     Call MD for:  temperature >100.4     Call MD for:  persistent nausea and vomiting or diarrhea     Call MD for:  severe uncontrolled pain     Call MD for:  redness, tenderness, or signs of infection (pain, swelling, redness, odor or green/yellow discharge around incision site)     Call MD for:  difficulty breathing or increased cough     Call MD for:  severe persistent headache

## 2017-08-04 ENCOUNTER — ANTI-COAG VISIT (OUTPATIENT)
Dept: CARDIOLOGY | Facility: CLINIC | Age: 72
End: 2017-08-04

## 2017-08-04 DIAGNOSIS — Z79.01 LONG TERM (CURRENT) USE OF ANTICOAGULANTS: Primary | ICD-10-CM

## 2017-08-04 DIAGNOSIS — D68.59 HYPERCOAGULABLE STATE: ICD-10-CM

## 2017-08-04 NOTE — PROGRESS NOTES
pts daughter states pt had a procedure done yesterday she is unsure of the exact procedure she states the  Burned the nerves in pts back , he was holding 5 days prior and and was told to restart tonight with normal dose of coumadin.

## 2017-08-06 DIAGNOSIS — F41.9 ANXIETY: ICD-10-CM

## 2017-08-07 VITALS
HEIGHT: 69 IN | TEMPERATURE: 98 F | HEART RATE: 55 BPM | RESPIRATION RATE: 17 BRPM | SYSTOLIC BLOOD PRESSURE: 111 MMHG | DIASTOLIC BLOOD PRESSURE: 71 MMHG | OXYGEN SATURATION: 96 % | WEIGHT: 305 LBS | BODY MASS INDEX: 45.18 KG/M2

## 2017-08-07 RX ORDER — ALPRAZOLAM 1 MG/1
TABLET ORAL
Qty: 30 TABLET | Refills: 0 | Status: SHIPPED | OUTPATIENT
Start: 2017-08-07 | End: 2017-09-13 | Stop reason: SDUPTHER

## 2017-08-11 ENCOUNTER — HOSPITAL ENCOUNTER (OUTPATIENT)
Dept: CARDIOLOGY | Facility: HOSPITAL | Age: 72
Discharge: HOME | End: 2017-08-11
Attending: INTERNAL MEDICINE
Payer: MEDICARE

## 2017-08-11 DIAGNOSIS — I51.9 LV DYSFUNCTION: ICD-10-CM

## 2017-08-11 DIAGNOSIS — I11.9 HYPERTENSIVE LEFT VENTRICULAR HYPERTROPHY, WITHOUT HEART FAILURE: ICD-10-CM

## 2017-08-11 PROCEDURE — 93306 TTE W/DOPPLER COMPLETE: CPT | Mod: 26,,, | Performed by: INTERNAL MEDICINE

## 2017-08-11 PROCEDURE — 63600175 PHARM REV CODE 636 W HCPCS: Performed by: INTERNAL MEDICINE

## 2017-08-11 PROCEDURE — C8929 TTE W OR WO FOL WCON,DOPPLER: HCPCS

## 2017-08-14 LAB
AORTIC VALVE REGURGITATION: ABNORMAL
DIASTOLIC DYSFUNCTION: YES
ESTIMATED PA SYSTOLIC PRESSURE: 25.66
MITRAL VALVE REGURGITATION: ABNORMAL
RETIRED EF AND QEF - SEE NOTES: 35 (ref 55–65)
TRICUSPID VALVE REGURGITATION: ABNORMAL

## 2017-08-14 RX ORDER — CARVEDILOL 25 MG/1
TABLET ORAL
Qty: 180 TABLET | Refills: 3 | Status: SHIPPED | OUTPATIENT
Start: 2017-08-14 | End: 2019-03-12 | Stop reason: SDUPTHER

## 2017-08-21 LAB — INR PPP: 2.5 (ref 2–3)

## 2017-08-21 NOTE — PROGRESS NOTES
Need new standing [facility]; cannot say as needed on it and it can only be for 6 months; fax . Pt is going to go again tomorrow

## 2017-08-22 LAB
INR PPP: 2.5
PROTHROMBIN TIME: 25.7 SEC (ref 9–11.5)

## 2017-08-23 ENCOUNTER — ANTI-COAG VISIT (OUTPATIENT)
Dept: CARDIOLOGY | Facility: CLINIC | Age: 72
End: 2017-08-23

## 2017-08-23 DIAGNOSIS — Z79.01 LONG TERM (CURRENT) USE OF ANTICOAGULANTS: ICD-10-CM

## 2017-08-23 DIAGNOSIS — D68.59 HYPERCOAGULABLE STATE: ICD-10-CM

## 2017-08-25 ENCOUNTER — OFFICE VISIT (OUTPATIENT)
Dept: PODIATRY | Facility: CLINIC | Age: 72
End: 2017-08-25
Payer: MEDICARE

## 2017-08-25 ENCOUNTER — TELEPHONE (OUTPATIENT)
Dept: PODIATRY | Facility: CLINIC | Age: 72
End: 2017-08-25

## 2017-08-25 VITALS — HEIGHT: 69 IN | WEIGHT: 305 LBS | BODY MASS INDEX: 45.18 KG/M2

## 2017-08-25 DIAGNOSIS — E11.42 DIABETIC POLYNEUROPATHY ASSOCIATED WITH TYPE 2 DIABETES MELLITUS: ICD-10-CM

## 2017-08-25 DIAGNOSIS — I73.9 PERIPHERAL VASCULAR DISEASE: ICD-10-CM

## 2017-08-25 DIAGNOSIS — L97.921 LEG ULCER, LEFT, LIMITED TO BREAKDOWN OF SKIN: Primary | ICD-10-CM

## 2017-08-25 PROCEDURE — 99212 OFFICE O/P EST SF 10 MIN: CPT | Mod: S$GLB,,, | Performed by: PODIATRIST

## 2017-08-25 PROCEDURE — 3008F BODY MASS INDEX DOCD: CPT | Mod: S$GLB,,, | Performed by: PODIATRIST

## 2017-08-25 PROCEDURE — 99499 UNLISTED E&M SERVICE: CPT | Mod: S$PBB,,, | Performed by: PODIATRIST

## 2017-08-25 PROCEDURE — 1159F MED LIST DOCD IN RCRD: CPT | Mod: S$GLB,,, | Performed by: PODIATRIST

## 2017-08-25 PROCEDURE — 99999 PR PBB SHADOW E&M-EST. PATIENT-LVL III: CPT | Mod: PBBFAC,,, | Performed by: PODIATRIST

## 2017-08-25 PROCEDURE — 1125F AMNT PAIN NOTED PAIN PRSNT: CPT | Mod: S$GLB,,, | Performed by: PODIATRIST

## 2017-08-25 PROCEDURE — 4010F ACE/ARB THERAPY RXD/TAKEN: CPT | Mod: S$GLB,,, | Performed by: PODIATRIST

## 2017-08-25 NOTE — PROGRESS NOTES
Subjective:      Patient ID: Richard Bustos is a 71 y.o. male.    Chief Complaint: Foot Ulcer (Left Leg)    Leg ulcer left.  Sudden onset hitting leg on walker, minmal improvement past 1-2 weeks,  aggravated by increased weight bearing, contact with clothing, pressure.  No previous medical treatment.  OTC pain med not helping.  Denies signs infection.    Review of Systems   Constitution: Negative for chills, diaphoresis, fever, malaise/fatigue and night sweats.   Cardiovascular: Positive for leg swelling. Negative for claudication, cyanosis and syncope.   Skin: Positive for poor wound healing. Negative for color change, dry skin, rash, suspicious lesions and unusual hair distribution.   Musculoskeletal: Negative for falls, joint pain, joint swelling, muscle cramps, muscle weakness and stiffness.   Gastrointestinal: Negative for constipation, diarrhea, nausea and vomiting.   Neurological: Positive for sensory change. Negative for brief paralysis, disturbances in coordination, focal weakness, numbness, paresthesias and tremors.           Objective:      Physical Exam   Cardiovascular:   Pulses:       Dorsalis pedis pulses are 1+ on the right side, and 1+ on the left side.        Posterior tibial pulses are 1+ on the right side, and 1+ on the left side.   All toes warm, pink.   Lymphadenopathy:   Negative lymphadenopathy bilateral popliteal fossa and tarsal tunnel.    Negative streaking left foot/leg   Neurological: A sensory deficit is present.   Decreased/absent vibratory sensation bilateral feet to 128Hz tuning fork.  Paresthesias, and burning bilateral feet with no clearly identified trigger or source.     Skin:   Skin thin, shiny, atrophic, with decreased density and distribution of pedal hair bilateral, but without hyperpigmentation, minerva discoloration,  ulcers, masses, nodules or cords palpated bilateral feet and legs.    Wound: anterior left distal leg    Size:    Pre: [id number]    Base:  Granular, pink with  moderate serous/serosanaguinous drainage only.  No pus, tracking, fluctuance, malodor, or cardinal signs infection.    Borders:   flat, pink, blanchable skin edges without undermining.               Assessment:       Encounter Diagnoses   Name Primary?    Leg ulcer, left, limited to breakdown of skin Yes    Peripheral vascular disease     Diabetic polyneuropathy associated with type 2 diabetes mellitus          Plan:       Richard was seen today for foot ulcer.    Diagnoses and all orders for this visit:    Leg ulcer, left, limited to breakdown of skin    Peripheral vascular disease    Diabetic polyneuropathy associated with type 2 diabetes mellitus      I counseled the patient on his conditions, their implications and medical management.        - Shoe inspection. Diabetic Foot Education. Patient reminded of the importance of good nutrition and blood sugar control to help prevent podiatric complications of diabetes. Patient instructed on proper foot hygeine. We discussed wearing proper shoe gear, daily foot inspections, never walking without protective shoe gear, never putting sharp instruments to feet, routine podiatric visits at least annually.      Cover wound left leg with mepilex border changing same every other day.            Return in about 1 week (around 9/1/2017) for leg ulcer elft.

## 2017-08-25 NOTE — TELEPHONE ENCOUNTER
----- Message from Lisha Velasco sent at 8/25/2017  7:56 AM CDT -----  Call Citlali / [phone] ... Patient has a new wound on leg ..(understands Dr Torrez is at [facility] today) .. But asking for an appointment asap .. Next available is 09/06 ?

## 2017-09-13 ENCOUNTER — OFFICE VISIT (OUTPATIENT)
Dept: FAMILY MEDICINE | Facility: CLINIC | Age: 72
End: 2017-09-13
Payer: MEDICARE

## 2017-09-13 ENCOUNTER — OFFICE VISIT (OUTPATIENT)
Dept: HEMATOLOGY/ONCOLOGY | Facility: CLINIC | Age: 72
End: 2017-09-13
Payer: MEDICARE

## 2017-09-13 ENCOUNTER — HOSPITAL ENCOUNTER (OUTPATIENT)
Dept: RADIOLOGY | Facility: CLINIC | Age: 72
Discharge: HOME | End: 2017-09-13
Attending: NURSE PRACTITIONER
Payer: MEDICARE

## 2017-09-13 ENCOUNTER — DOCUMENTATION ONLY (OUTPATIENT)
Dept: FAMILY MEDICINE | Facility: CLINIC | Age: 72
End: 2017-09-13

## 2017-09-13 VITALS
OXYGEN SATURATION: 94 % | SYSTOLIC BLOOD PRESSURE: 128 MMHG | TEMPERATURE: 98 F | WEIGHT: 301.56 LBS | HEIGHT: 69 IN | DIASTOLIC BLOOD PRESSURE: 68 MMHG | BODY MASS INDEX: 44.67 KG/M2 | HEART RATE: 55 BPM

## 2017-09-13 VITALS
TEMPERATURE: 98 F | DIASTOLIC BLOOD PRESSURE: 78 MMHG | SYSTOLIC BLOOD PRESSURE: 126 MMHG | RESPIRATION RATE: 18 BRPM | WEIGHT: 302.19 LBS | BODY MASS INDEX: 44.76 KG/M2 | HEIGHT: 69 IN | HEART RATE: 56 BPM

## 2017-09-13 DIAGNOSIS — D63.1 ANEMIA, CHRONIC RENAL FAILURE, UNSPECIFIED STAGE: ICD-10-CM

## 2017-09-13 DIAGNOSIS — D68.9 CLOTTING DISORDER: ICD-10-CM

## 2017-09-13 DIAGNOSIS — R05.9 COUGH: ICD-10-CM

## 2017-09-13 DIAGNOSIS — E11.59 HYPERTENSION ASSOCIATED WITH DIABETES: ICD-10-CM

## 2017-09-13 DIAGNOSIS — N18.9 ANEMIA, CHRONIC RENAL FAILURE, UNSPECIFIED STAGE: ICD-10-CM

## 2017-09-13 DIAGNOSIS — D64.9 ANEMIA, UNSPECIFIED: ICD-10-CM

## 2017-09-13 DIAGNOSIS — M54.5 CHRONIC LOW BACK PAIN, UNSPECIFIED BACK PAIN LATERALITY, WITH SCIATICA PRESENCE UNSPECIFIED: ICD-10-CM

## 2017-09-13 DIAGNOSIS — J06.9 UPPER RESPIRATORY TRACT INFECTION, UNSPECIFIED TYPE: Primary | ICD-10-CM

## 2017-09-13 DIAGNOSIS — Z79.01 LONG TERM (CURRENT) USE OF ANTICOAGULANTS: ICD-10-CM

## 2017-09-13 DIAGNOSIS — F41.9 ANXIETY: ICD-10-CM

## 2017-09-13 DIAGNOSIS — I15.2 HYPERTENSION ASSOCIATED WITH DIABETES: ICD-10-CM

## 2017-09-13 DIAGNOSIS — D63.8 ANEMIA OF OTHER CHRONIC DISEASE: ICD-10-CM

## 2017-09-13 DIAGNOSIS — D64.9 ANEMIA, UNSPECIFIED TYPE: ICD-10-CM

## 2017-09-13 DIAGNOSIS — G89.29 CHRONIC LOW BACK PAIN, UNSPECIFIED BACK PAIN LATERALITY, WITH SCIATICA PRESENCE UNSPECIFIED: ICD-10-CM

## 2017-09-13 DIAGNOSIS — Z15.89 MTHFR MUTATION (METHYLENETETRAHYDROFOLATE REDUCTASE): Primary | ICD-10-CM

## 2017-09-13 PROCEDURE — 99213 OFFICE O/P EST LOW 20 MIN: CPT | Mod: ,,, | Performed by: INTERNAL MEDICINE

## 2017-09-13 PROCEDURE — 3008F BODY MASS INDEX DOCD: CPT | Mod: ,,, | Performed by: INTERNAL MEDICINE

## 2017-09-13 PROCEDURE — 3074F SYST BP LT 130 MM HG: CPT | Mod: ,,, | Performed by: INTERNAL MEDICINE

## 2017-09-13 PROCEDURE — 99499 UNLISTED E&M SERVICE: CPT | Mod: S$PBB,,, | Performed by: NURSE PRACTITIONER

## 2017-09-13 PROCEDURE — 1159F MED LIST DOCD IN RCRD: CPT | Mod: ,,, | Performed by: INTERNAL MEDICINE

## 2017-09-13 PROCEDURE — 99999 PR PBB SHADOW E&M-EST. PATIENT-LVL V: CPT | Mod: PBBFAC,,, | Performed by: NURSE PRACTITIONER

## 2017-09-13 PROCEDURE — 3074F SYST BP LT 130 MM HG: CPT | Mod: S$GLB,,, | Performed by: NURSE PRACTITIONER

## 2017-09-13 PROCEDURE — 99214 OFFICE O/P EST MOD 30 MIN: CPT | Mod: 25,S$GLB,, | Performed by: NURSE PRACTITIONER

## 2017-09-13 PROCEDURE — 4010F ACE/ARB THERAPY RXD/TAKEN: CPT | Mod: S$GLB,,, | Performed by: NURSE PRACTITIONER

## 2017-09-13 PROCEDURE — 3078F DIAST BP <80 MM HG: CPT | Mod: ,,, | Performed by: INTERNAL MEDICINE

## 2017-09-13 PROCEDURE — 3008F BODY MASS INDEX DOCD: CPT | Mod: S$GLB,,, | Performed by: NURSE PRACTITIONER

## 2017-09-13 PROCEDURE — 1126F AMNT PAIN NOTED NONE PRSNT: CPT | Mod: S$GLB,,, | Performed by: NURSE PRACTITIONER

## 2017-09-13 PROCEDURE — 3078F DIAST BP <80 MM HG: CPT | Mod: S$GLB,,, | Performed by: NURSE PRACTITIONER

## 2017-09-13 PROCEDURE — 96372 THER/PROPH/DIAG INJ SC/IM: CPT | Mod: S$GLB,,, | Performed by: NURSE PRACTITIONER

## 2017-09-13 PROCEDURE — 1126F AMNT PAIN NOTED NONE PRSNT: CPT | Mod: ,,, | Performed by: INTERNAL MEDICINE

## 2017-09-13 PROCEDURE — 71020 XR CHEST PA AND LATERAL: CPT | Mod: 26,,, | Performed by: RADIOLOGY

## 2017-09-13 PROCEDURE — 71020 XR CHEST PA AND LATERAL: CPT | Mod: TC,PO

## 2017-09-13 PROCEDURE — 1159F MED LIST DOCD IN RCRD: CPT | Mod: S$GLB,,, | Performed by: NURSE PRACTITIONER

## 2017-09-13 RX ORDER — IPRATROPIUM BROMIDE 0.5 MG/2.5ML
500 SOLUTION RESPIRATORY (INHALATION) 4 TIMES DAILY
Qty: 1 BOX | Refills: 0 | Status: SHIPPED | OUTPATIENT
Start: 2017-09-13 | End: 2018-12-05

## 2017-09-13 RX ORDER — GUAIFENESIN AND DEXTROMETHORPHAN HYDROBROMIDE 60; 1200 MG/1; MG/1
1 TABLET, EXTENDED RELEASE ORAL 2 TIMES DAILY
COMMUNITY
Start: 2017-09-13 | End: 2017-09-23

## 2017-09-13 RX ORDER — HYDROCODONE BITARTRATE AND ACETAMINOPHEN 7.5; 325 MG/1; MG/1
1 TABLET ORAL EVERY 6 HOURS PRN
Qty: 90 TABLET | Refills: 0 | Status: SHIPPED | OUTPATIENT
Start: 2017-09-13 | End: 2017-10-23 | Stop reason: SDUPTHER

## 2017-09-13 RX ORDER — DEXAMETHASONE SODIUM PHOSPHATE 4 MG/ML
4 INJECTION, SOLUTION INTRA-ARTICULAR; INTRALESIONAL; INTRAMUSCULAR; INTRAVENOUS; SOFT TISSUE
Status: COMPLETED | OUTPATIENT
Start: 2017-09-13 | End: 2017-09-13

## 2017-09-13 RX ORDER — ALPRAZOLAM 1 MG/1
TABLET ORAL
Qty: 30 TABLET | Refills: 0 | Status: SHIPPED | OUTPATIENT
Start: 2017-09-13 | End: 2017-12-07 | Stop reason: SDUPTHER

## 2017-09-13 RX ADMIN — DEXAMETHASONE SODIUM PHOSPHATE 4 MG: 4 INJECTION, SOLUTION INTRA-ARTICULAR; INTRALESIONAL; INTRAMUSCULAR; INTRAVENOUS; SOFT TISSUE at 11:09

## 2017-09-13 NOTE — PROGRESS NOTES
[facility] Hematology/Oncology  PROGRESS NOTE      Subjective:       Patient ID:   NAME: Richard Bustos : 1945     72 y.o. male    Referring Doc: Familia Ramirez Jr., *  Other Physicians: Bebeto Torrez Chamberlain    Chief Complaint:  Follow-up and Results (labs in media )      History of Present Illness:     Patient returns today for a regularly scheduled follow-up visit.  The patient is doing ok overall with no new issues. He had recent mild upper respiratory versus mild case of flu. He denies any current fever, CP, SOB, HA's or N/V.             ROS:   GEN: normal without any fever, night sweats or weight loss  HEENT: normal with no HA's, sore throat, stiff neck, changes in vision  CV: normal with no CP, SOB, PND, THOMPSON or orthopnea  PULM: normal with no SOB, cough, hemoptysis, sputum or pleuritic pain  GI: normal with no abdominal pain, nausea, vomiting, constipation, diarrhea, melanotic stools, BRBPR, or hematemesis  : normal with no hematuria, dysuria  BREAST: normal with no mass, discharge, pain  SKIN: normal with no rash, erythema, bruising, or swelling    Allergies:  Review of patient's allergies indicates:   Allergen Reactions    Shellfish containing products Other (See Comments)     Other reaction(s): Gout       Medications:    Current Outpatient Prescriptions:     alendronate-vitamin D3 (FOSAMAX PLUS D) 70 mg- 2,800 unit per tablet, Take 1 tablet by mouth every 7 days., Disp: 4 tablet, Rfl: 6    allopurinol (ZYLOPRIM) 100 MG tablet, TAKE 1 TABLET(100 MG) BY MOUTH EVERY DAY, Disp: 90 tablet, Rfl: 3    alprazolam (XANAX) 1 MG tablet, TAKE 1 TABLET(1 MG) BY MOUTH EVERY EVENING, Disp: 30 tablet, Rfl: 0    aspirin (ECOTRIN) 81 MG EC tablet, Take 81 mg by mouth once daily., Disp: , Rfl:     atorvastatin (LIPITOR) 80 MG tablet, TAKE 1 TABLET(80 MG) BY MOUTH EVERY DAY, Disp: 90 tablet, Rfl: 3    calcitRIOL (ROCALTROL) 0.5 MCG Cap, , Disp: , Rfl:     calcium carbonate (TUMS ULTRA) 400 mg calcium (1,000  mg) Chew, Take 1 tablet (400 mg total) by mouth once daily at 6am., Disp: 30 each, Rfl: 11    carvedilol (COREG) 25 MG tablet, Take 1 tablet (25 mg total) by mouth 2 (two) times daily with meals., Disp: 180 tablet, Rfl: 3    carvedilol (COREG) 25 MG tablet, TAKE 1 TABLET BY MOUTH TWICE DAILY WITH MEALS, Disp: 180 tablet, Rfl: 3    ciclopirox (PENLAC) 8 % Soln, Apply topically nightly. (Patient taking differently: Apply topically daily as needed. ), Disp: 6.6 mL, Rfl: 11    ciclopirox 0.77 % Gel, Apply topically 2 (two) times daily., Disp: 100 g, Rfl: 3    clopidogrel (PLAVIX) 75 mg tablet, Take 1 tablet (75 mg total) by mouth once daily., Disp: 90 tablet, Rfl: 11    clotrimazole-betamethasone 1-0.05% (LOTRISONE) cream, Apply topically 2 (two) times daily. To head of penis (Patient taking differently: Apply topically daily as needed. To head of penis), Disp: 1 Tube, Rfl: 2    ergocalciferol (ERGOCALCIFEROL) 50,000 unit Cap, TK 1 C PO Q WEEK, Disp: , Rfl: 0    escitalopram oxalate (LEXAPRO) 10 MG tablet, Take 1/2 tablet (5 mg) daily for first 7 days.  Thereafter take one tablet (10 mg) daily. (Patient taking differently: Take 10 mg by mouth once daily. ), Disp: 30 tablet, Rfl: 11    ferrous sulfate 325 mg (65 mg iron) Tab tablet, TAKE 1 TABLET BY MOUTH TWICE DAILY, Disp: 180 tablet, Rfl: 3    fluticasone (FLONASE) 50 mcg/actuation nasal spray, 2 sprays by Each Nare route once daily., Disp: 1 Bottle, Rfl: 11    FOLIC ACID/MULTIVIT-MIN/LUTEIN (CENTRUM SILVER ORAL), Take 1 tablet by mouth once daily., Disp: , Rfl:     furosemide (LASIX) 40 MG tablet, TAKE 1 TABLET BY MOUTH DAILY AS NEEDED, Disp: 90 tablet, Rfl: 3    hydrocodone-acetaminophen 7.5-325mg (NORCO) 7.5-325 mg per tablet, Take 1 tablet by mouth every 6 (six) hours as needed for Pain., Disp: 90 tablet, Rfl: 0    L-METHYL-B6-B12 3-35-2 mg Tab, TAKE 1 TABLET BY MOUTH TWICE DAILY, Disp: 180 tablet, Rfl: 3    lisinopril (PRINIVIL,ZESTRIL) 40 MG  "tablet, Take 40 mg by mouth every evening. , Disp: , Rfl:     MAGOX 400 mg tablet, TAKE 1 TABLET(400 MG) BY MOUTH EVERY DAY, Disp: 90 tablet, Rfl: 3    mirabegron (MYRBETRIQ) 50 mg Tb24, Take 1 tablet (50 mg total) by mouth once daily., Disp: 30 tablet, Rfl: 11    mupirocin (BACTROBAN) 2 % ointment, Apply topically 2 (two) times daily., Disp: 30 g, Rfl: 1    nitroGLYCERIN 0.4 MG/DOSE TL SPRY (NITROLINGUAL) 400 mcg/spray spray, PLACE 1 SPRAY UNDER THE TONGUE EVERY 5 MINUTES AS NEEDED FOR CHEST PAIN, Disp: 4.9 g, Rfl: 3    omeprazole (PRILOSEC) 20 MG capsule, TAKE 1 CAPSULE(20 MG) BY MOUTH TWICE DAILY, Disp: 180 capsule, Rfl: 0    omeprazole (PRILOSEC) 20 MG capsule, Take 1 capsule (20 mg total) by mouth once daily., Disp: 30 capsule, Rfl: 3    ranitidine (ZANTAC) 150 MG tablet, TAKE 1 TABLET(150 MG) BY MOUTH TWICE DAILY, Disp: 180 tablet, Rfl: 3    salicylic acid (SALACYN) 6 % Crea, Apply 1 application topically 2 (two) times daily. (Patient taking differently: Apply 1 application topically daily as needed. ), Disp: 454 g, Rfl: 11    vit C-vit E-lutein-min-om-3 (OCUVITE) 466-58-2-150 mg-unit-mg-mg Cap, Take 1 capsule by mouth once daily., Disp: , Rfl:     warfarin (COUMADIN) 5 MG tablet, TAKE 1 TABLET BY MOUTH EVERY DAY, Disp: 90 tablet, Rfl: 3    PMHx/PSHx Updates:  See patient's last visit with me on 3/15/2017  See H&P on 4/9/2014        Pathology:  No matching staging information was found for the patient.          Objective:     Vitals:  Blood pressure 126/78, pulse (!) 56, temperature 97.9 °F (36.6 °C), resp. rate 18, height 5' 9" (1.753 m), weight (!) 137.1 kg (302 lb 3.2 oz).    Physical Examination:   GEN: no apparent distress, comfortable; AAOx3  HEAD: atraumatic and normocephalic  EYES: no pallor, no icterus, PERRLA  ENT: OMM, no pharyngeal erythema, external ears WNL; no nasal discharge; no thrush  NECK: no masses, thyroid normal, trachea midline, no LAD/LN's, supple  CV: RRR with no murmur; " normal pulse; normal S1 and S2; no pedal edema  CHEST: Normal respiratory effort; CTAB; normal breath sounds; no wheeze or crackles  ABDOM: nontender and nondistended; soft; normal bowel sounds; no rebound/guarding; overweight  MUSC/Skeletal: ROM normal; no crepitus; joints normal; no deformities or arthropathy; uses cane to walk  EXTREM: no clubbing, cyanosis, inflammation or swelling  SKIN: no rashes, lesions, ulcers, petechiae or subcutaneous nodules  : no mcdaniels  NEURO: grossly intact; motor/sensory WNL; AAOx3; no tremors  PSYCH: normal mood, affect and behavior  LYMPH: normal cervical, supraclavicular, axillary and groin LN's            Labs:     5/3/2017  Lab Results   Component Value Date    WBC 5.47 05/03/2017    HGB 10.8 (L) 05/03/2017    HCT 33.8 (L) 05/03/2017     (H) 05/03/2017     05/03/2017 8/23 INR 2.5        Radiology/Diagnostic Studies:    No results found.    I have reviewed all available lab results and radiology reports.    Assessment/Plan:   (1) 72 y.o. male with diagnosis of chronic clot disorder with underlying MTHFR issues  - he was previously under the care of Dr Krunal Rodriguez with hematology at [facility]  - he has been on coumadin therapy per direction of Dr Tate with cardiology      (2) CAD - followed by Dr Tate    (3) Abdominal hematoma in past     (4) CRI - followed by Nephrology (Amy?)    (5) Chronic multifactorial anemia with underlying anemia of chronic disorders and chronic renal disease; chronic GIB    (6) prior left toe issues - followed by Dr Torrez - resolved per patient    (7) Urology following for prostate - Dr Moss      PLAN:  1. Check up to date basic labs incl. Iron panel  2. F/u with PCP, card, and neph  3. Continue coumadin per Dr Tate's direction  4. RTC in 6 months  Fax note to Familia Ramirez Jr., *, Jone Moss    Discussion:     I have explained all of the above in detail and the patient understands all of the current recommendation(s). I have  answered all of their questions to the best of my ability and to their complete satisfaction.   The patient is to continue with the current management plan.            Electronically signed by Kai Ortiz MD

## 2017-09-13 NOTE — PROGRESS NOTES
Administered 4 mg dexamethasone IM. Patient tolerated well. No bleeding at insertion site noted. Pain scale 0/10 with injection. 2 patient identifiers used (name, ), aseptic technique maintained.

## 2017-09-13 NOTE — PROGRESS NOTES
Pre-Visit Chart Review  For Appointment Scheduled on 9/13/17    Health Maintenance Due   Topic Date Due    Eye Exam  09[birth year]04/1955    Influenza Vaccine  08/01/2017    Hemoglobin A1c  08/01/2017    Lipid Panel  08/08/2017

## 2017-09-13 NOTE — LETTER
September 13, 2017      Familia Ramirez Jr., MD  [address] [address]           [facility]  [address]  [address]  [place] [address]  Phone: [phone]  Fax: [phone]          Patient: Richard Bustos   MR Number: [id number]   YOB: 1945   Date of Visit: 9/13/2017       Dear Dr. Familia Ramirez Jr.:    Thank you for referring Richard Bustos to me for evaluation. Attached you will find relevant portions of my assessment and plan of care.    If you have questions, please do not hesitate to call me. I look forward to following Richard Bustos along with you.    Sincerely,    Kai Ortiz MD    Enclosure  CC:  No Recipients    If you would like to receive this communication electronically, please contact [email or web address] or [phone] to request more information on [employer] access.    For providers and/or their staff who would like to refer a patient to [facility], please contact us through our one-stop-shop provider referral line, [facility] [place], at [phone].    If you feel you have received this communication in error or would no longer like to receive these types of communications, please e-mail [email or web address]

## 2017-09-13 NOTE — PATIENT INSTRUCTIONS

## 2017-09-13 NOTE — PROGRESS NOTES
Subjective:       Patient ID: Richard Bustos is a 72 y.o. male.    Chief Complaint: Fever; Cough (chest congestion); and Sinus Problem    Cough   This is a new problem. The current episode started in the past 7 days. The problem has been unchanged. The problem occurs every few minutes. The cough is productive of sputum. Associated symptoms include a fever (t max 100.1), nasal congestion, shortness of breath, sweats and wheezing. Pertinent negatives include no chest pain, chills, ear congestion or postnasal drip. Nothing aggravates the symptoms. He has tried a beta-agonist inhaler and OTC cough suppressant (z pack) for the symptoms. The treatment provided no relief. There is no history of asthma, COPD or pneumonia.     Review of Systems   Constitutional: Positive for fever (t max 100.1). Negative for chills.   HENT: Negative for postnasal drip.    Respiratory: Positive for cough, shortness of breath and wheezing.    Cardiovascular: Negative for chest pain.       Objective:      Physical Exam   Constitutional: He is oriented to person, place, and time. He appears well-developed and well-nourished. No distress.   HENT:   Head: Normocephalic and atraumatic.   Right Ear: External ear normal.   Left Ear: External ear normal.   Mouth/Throat: Oropharynx is clear and moist. No oropharyngeal exudate.   Eyes: Pupils are equal, round, and reactive to light. Right eye exhibits no discharge. Left eye exhibits no discharge.   Neck: Neck supple. No thyromegaly present.   Cardiovascular: Normal rate and regular rhythm.  Exam reveals no gallop and no friction rub.    No murmur heard.  Pulmonary/Chest: Effort normal and breath sounds normal. No respiratory distress. He has no wheezes. He has no rales.   Lymphadenopathy:     He has no cervical adenopathy.   Neurological: He is alert and oriented to person, place, and time. Coordination normal.   Skin: Skin is warm and dry.   Psychiatric: He has a normal mood and affect. His behavior is  normal. Thought content normal.   Vitals reviewed.          Current Outpatient Prescriptions:     alendronate-vitamin D3 (FOSAMAX PLUS D) 70 mg- 2,800 unit per tablet, Take 1 tablet by mouth every 7 days., Disp: 4 tablet, Rfl: 6    allopurinol (ZYLOPRIM) 100 MG tablet, TAKE 1 TABLET(100 MG) BY MOUTH EVERY DAY, Disp: 90 tablet, Rfl: 3    alprazolam (XANAX) 1 MG tablet, TAKE 1 TABLET(1 MG) BY MOUTH EVERY EVENING, Disp: 30 tablet, Rfl: 0    aspirin (ECOTRIN) 81 MG EC tablet, Take 81 mg by mouth once daily., Disp: , Rfl:     atorvastatin (LIPITOR) 80 MG tablet, TAKE 1 TABLET(80 MG) BY MOUTH EVERY DAY, Disp: 90 tablet, Rfl: 3    calcitRIOL (ROCALTROL) 0.5 MCG Cap, , Disp: , Rfl:     calcium carbonate (TUMS ULTRA) 400 mg calcium (1,000 mg) Chew, Take 1 tablet (400 mg total) by mouth once daily at 6am., Disp: 30 each, Rfl: 11    carvedilol (COREG) 25 MG tablet, TAKE 1 TABLET BY MOUTH TWICE DAILY WITH MEALS, Disp: 180 tablet, Rfl: 3    ciclopirox (PENLAC) 8 % Soln, Apply topically nightly. (Patient taking differently: Apply topically daily as needed. ), Disp: 6.6 mL, Rfl: 11    ciclopirox 0.77 % Gel, Apply topically 2 (two) times daily., Disp: 100 g, Rfl: 3    clopidogrel (PLAVIX) 75 mg tablet, Take 1 tablet (75 mg total) by mouth once daily., Disp: 90 tablet, Rfl: 11    clotrimazole-betamethasone 1-0.05% (LOTRISONE) cream, Apply topically 2 (two) times daily. To head of penis (Patient taking differently: Apply topically daily as needed. To head of penis), Disp: 1 Tube, Rfl: 2    ergocalciferol (ERGOCALCIFEROL) 50,000 unit Cap, TK 1 C PO Q WEEK, Disp: , Rfl: 0    escitalopram oxalate (LEXAPRO) 10 MG tablet, Take 1/2 tablet (5 mg) daily for first 7 days.  Thereafter take one tablet (10 mg) daily. (Patient taking differently: Take 10 mg by mouth once daily. ), Disp: 30 tablet, Rfl: 11    ferrous sulfate 325 mg (65 mg iron) Tab tablet, TAKE 1 TABLET BY MOUTH TWICE DAILY, Disp: 180 tablet, Rfl: 3    fluticasone  (FLONASE) 50 mcg/actuation nasal spray, 2 sprays by Each Nare route once daily., Disp: 1 Bottle, Rfl: 11    FOLIC ACID/MULTIVIT-MIN/LUTEIN (CENTRUM SILVER ORAL), Take 1 tablet by mouth once daily., Disp: , Rfl:     furosemide (LASIX) 40 MG tablet, TAKE 1 TABLET BY MOUTH DAILY AS NEEDED, Disp: 90 tablet, Rfl: 3    hydrocodone-acetaminophen 7.5-325mg (NORCO) 7.5-325 mg per tablet, Take 1 tablet by mouth every 6 (six) hours as needed for Pain., Disp: 90 tablet, Rfl: 0    L-METHYL-B6-B12 3-35-2 mg Tab, TAKE 1 TABLET BY MOUTH TWICE DAILY, Disp: 180 tablet, Rfl: 3    lisinopril (PRINIVIL,ZESTRIL) 40 MG tablet, Take 40 mg by mouth every evening. , Disp: , Rfl:     MAGOX 400 mg tablet, TAKE 1 TABLET(400 MG) BY MOUTH EVERY DAY, Disp: 90 tablet, Rfl: 3    mirabegron (MYRBETRIQ) 50 mg Tb24, Take 1 tablet (50 mg total) by mouth once daily., Disp: 30 tablet, Rfl: 11    mupirocin (BACTROBAN) 2 % ointment, Apply topically 2 (two) times daily., Disp: 30 g, Rfl: 1    nitroGLYCERIN 0.4 MG/DOSE TL SPRY (NITROLINGUAL) 400 mcg/spray spray, PLACE 1 SPRAY UNDER THE TONGUE EVERY 5 MINUTES AS NEEDED FOR CHEST PAIN, Disp: 4.9 g, Rfl: 3    omeprazole (PRILOSEC) 20 MG capsule, Take 1 capsule (20 mg total) by mouth once daily., Disp: 30 capsule, Rfl: 3    ranitidine (ZANTAC) 150 MG tablet, TAKE 1 TABLET(150 MG) BY MOUTH TWICE DAILY, Disp: 180 tablet, Rfl: 3    salicylic acid (SALACYN) 6 % Crea, Apply 1 application topically 2 (two) times daily. (Patient taking differently: Apply 1 application topically daily as needed. ), Disp: 454 g, Rfl: 11    vit C-vit E-lutein-min-om-3 (OCUVITE) 831-65-1-150 mg-unit-mg-mg Cap, Take 1 capsule by mouth once daily., Disp: , Rfl:     warfarin (COUMADIN) 5 MG tablet, TAKE 1 TABLET BY MOUTH EVERY DAY, Disp: 90 tablet, Rfl: 3  Assessment:       1. Upper respiratory tract infection, unspecified type    2. Hypertension associated with diabetes        Plan:       Upper respiratory tract infection,  unspecified type  Notify for worsening symptoms.  -     X-Ray Chest PA And Lateral; Future; Expected date: 09/13/2017--negative for pneumonia.  -     ipratropium (ATROVENT) 0.02 % nebulizer solution; Take 2.5 mLs (500 mcg total) by nebulization 4 (four) times daily. Rescue  Dispense: 1 Box; Refill: 0  -     MUCINEX DM 60-1,200 mg Tb12; Take 1 tablet by mouth 2 (two) times daily.  -     dexamethasone injection 4 mg; Inject 1 mL (4 mg total) into the muscle one time.    Hypertension associated with diabetes  Stable on current medication.    Patient readiness: acceptance and barriers:none    During the course of the visit the patient was educated and counseled about the following:     Hypertension:   Medication: no change.    Goals: Hypertension: Reduce Blood Pressure    Did patient meet goals/outcomes: Yes    The following self management tools provided: declined    Patient Instructions (the written plan) was given to the patient/family.     Time spent with patient: 15 minutes

## 2017-09-14 ENCOUNTER — LAB VISIT (OUTPATIENT)
Dept: LAB | Facility: HOSPITAL | Age: 72
End: 2017-09-14
Attending: INTERNAL MEDICINE
Payer: MEDICARE

## 2017-09-14 ENCOUNTER — OFFICE VISIT (OUTPATIENT)
Dept: PODIATRY | Facility: CLINIC | Age: 72
End: 2017-09-14
Payer: MEDICARE

## 2017-09-14 ENCOUNTER — OFFICE VISIT (OUTPATIENT)
Dept: PAIN MEDICINE | Facility: CLINIC | Age: 72
End: 2017-09-14
Payer: MEDICARE

## 2017-09-14 VITALS — WEIGHT: 300.69 LBS | HEIGHT: 69 IN | RESPIRATION RATE: 16 BRPM | BODY MASS INDEX: 44.54 KG/M2

## 2017-09-14 VITALS
HEIGHT: 69 IN | SYSTOLIC BLOOD PRESSURE: 146 MMHG | WEIGHT: 301 LBS | BODY MASS INDEX: 44.58 KG/M2 | HEART RATE: 54 BPM | DIASTOLIC BLOOD PRESSURE: 81 MMHG

## 2017-09-14 DIAGNOSIS — M47.816 FACET ARTHROPATHY, LUMBAR: Primary | ICD-10-CM

## 2017-09-14 DIAGNOSIS — E72.12 METHYLENE THF REDUCTASE DEFICIENCY AND HOMOCYSTINURIA: Primary | ICD-10-CM

## 2017-09-14 DIAGNOSIS — L97.921 LEG ULCER, LEFT, LIMITED TO BREAKDOWN OF SKIN: Primary | ICD-10-CM

## 2017-09-14 DIAGNOSIS — E72.11 METHYLENE THF REDUCTASE DEFICIENCY AND HOMOCYSTINURIA: Primary | ICD-10-CM

## 2017-09-14 DIAGNOSIS — Z79.01 LONG TERM (CURRENT) USE OF ANTICOAGULANTS: ICD-10-CM

## 2017-09-14 DIAGNOSIS — B35.1 ONYCHOMYCOSIS DUE TO DERMATOPHYTE: ICD-10-CM

## 2017-09-14 DIAGNOSIS — D64.9 ANEMIA, UNSPECIFIED: ICD-10-CM

## 2017-09-14 DIAGNOSIS — I73.9 PERIPHERAL VASCULAR DISEASE: ICD-10-CM

## 2017-09-14 DIAGNOSIS — M51.36 DDD (DEGENERATIVE DISC DISEASE), LUMBAR: ICD-10-CM

## 2017-09-14 DIAGNOSIS — D68.9 BLOOD CLOTTING DISORDER: ICD-10-CM

## 2017-09-14 DIAGNOSIS — E11.42 DIABETIC POLYNEUROPATHY ASSOCIATED WITH TYPE 2 DIABETES MELLITUS: ICD-10-CM

## 2017-09-14 LAB
ALBUMIN SERPL BCP-MCNC: 2.9 G/DL
ALP SERPL-CCNC: 94 U/L
ALT SERPL W/O P-5'-P-CCNC: 27 U/L
ANION GAP SERPL CALC-SCNC: 10 MMOL/L
AST SERPL-CCNC: 25 U/L
BASOPHILS # BLD AUTO: 0 K/UL
BASOPHILS NFR BLD: 0 %
BILIRUB SERPL-MCNC: 0.5 MG/DL
BUN SERPL-MCNC: 31 MG/DL
CALCIUM SERPL-MCNC: 9.8 MG/DL
CHLORIDE SERPL-SCNC: 106 MMOL/L
CO2 SERPL-SCNC: 25 MMOL/L
CREAT SERPL-MCNC: 1.5 MG/DL
DIFFERENTIAL METHOD: ABNORMAL
EOSINOPHIL # BLD AUTO: 0 K/UL
EOSINOPHIL NFR BLD: 0.1 %
ERYTHROCYTE [DISTWIDTH] IN BLOOD BY AUTOMATED COUNT: 14.4 %
EST. GFR  (AFRICAN AMERICAN): 54 ML/MIN/1.73 M^2
EST. GFR  (NON AFRICAN AMERICAN): 46 ML/MIN/1.73 M^2
GLUCOSE SERPL-MCNC: 120 MG/DL
HCT VFR BLD AUTO: 30.3 %
HGB BLD-MCNC: 10.2 G/DL
LYMPHOCYTES # BLD AUTO: 0.7 K/UL
LYMPHOCYTES NFR BLD: 8.6 %
MCH RBC QN AUTO: 33.7 PG
MCHC RBC AUTO-ENTMCNC: 33.7 G/DL
MCV RBC AUTO: 100 FL
MONOCYTES # BLD AUTO: 0.4 K/UL
MONOCYTES NFR BLD: 5 %
NEUTROPHILS # BLD AUTO: 7.1 K/UL
NEUTROPHILS NFR BLD: 86.3 %
PLATELET # BLD AUTO: 220 K/UL
PMV BLD AUTO: 7.9 FL
POTASSIUM SERPL-SCNC: 4.4 MMOL/L
PROT SERPL-MCNC: 7.1 G/DL
RBC # BLD AUTO: 3.03 M/UL
SODIUM SERPL-SCNC: 141 MMOL/L
WBC # BLD AUTO: 8.2 K/UL

## 2017-09-14 PROCEDURE — 3008F BODY MASS INDEX DOCD: CPT | Mod: S$GLB,,, | Performed by: PHYSICIAN ASSISTANT

## 2017-09-14 PROCEDURE — 3077F SYST BP >= 140 MM HG: CPT | Mod: S$GLB,,, | Performed by: PODIATRIST

## 2017-09-14 PROCEDURE — 99499 UNLISTED E&M SERVICE: CPT | Mod: S$PBB,,, | Performed by: PODIATRIST

## 2017-09-14 PROCEDURE — 80053 COMPREHEN METABOLIC PANEL: CPT

## 2017-09-14 PROCEDURE — 11721 DEBRIDE NAIL 6 OR MORE: CPT | Mod: Q9,S$GLB,, | Performed by: PODIATRIST

## 2017-09-14 PROCEDURE — 3078F DIAST BP <80 MM HG: CPT | Mod: S$GLB,,, | Performed by: PODIATRIST

## 2017-09-14 PROCEDURE — 99212 OFFICE O/P EST SF 10 MIN: CPT | Mod: 25,S$GLB,, | Performed by: PODIATRIST

## 2017-09-14 PROCEDURE — 99999 PR PBB SHADOW E&M-EST. PATIENT-LVL IV: CPT | Mod: PBBFAC,,, | Performed by: PHYSICIAN ASSISTANT

## 2017-09-14 PROCEDURE — 1159F MED LIST DOCD IN RCRD: CPT | Mod: S$GLB,,, | Performed by: PHYSICIAN ASSISTANT

## 2017-09-14 PROCEDURE — 3008F BODY MASS INDEX DOCD: CPT | Mod: S$GLB,,, | Performed by: PODIATRIST

## 2017-09-14 PROCEDURE — 99999 PR PBB SHADOW E&M-EST. PATIENT-LVL IV: CPT | Mod: PBBFAC,,, | Performed by: PODIATRIST

## 2017-09-14 PROCEDURE — 99499 UNLISTED E&M SERVICE: CPT | Mod: S$PBB,,, | Performed by: PHYSICIAN ASSISTANT

## 2017-09-14 PROCEDURE — 85025 COMPLETE CBC W/AUTO DIFF WBC: CPT

## 2017-09-14 PROCEDURE — 3077F SYST BP >= 140 MM HG: CPT | Mod: S$GLB,,, | Performed by: PHYSICIAN ASSISTANT

## 2017-09-14 PROCEDURE — 1159F MED LIST DOCD IN RCRD: CPT | Mod: S$GLB,,, | Performed by: PODIATRIST

## 2017-09-14 PROCEDURE — 1125F AMNT PAIN NOTED PAIN PRSNT: CPT | Mod: S$GLB,,, | Performed by: PHYSICIAN ASSISTANT

## 2017-09-14 PROCEDURE — 3079F DIAST BP 80-89 MM HG: CPT | Mod: S$GLB,,, | Performed by: PHYSICIAN ASSISTANT

## 2017-09-14 PROCEDURE — 36415 COLL VENOUS BLD VENIPUNCTURE: CPT

## 2017-09-14 PROCEDURE — 4010F ACE/ARB THERAPY RXD/TAKEN: CPT | Mod: S$GLB,,, | Performed by: PODIATRIST

## 2017-09-14 PROCEDURE — 99214 OFFICE O/P EST MOD 30 MIN: CPT | Mod: S$GLB,,, | Performed by: PHYSICIAN ASSISTANT

## 2017-09-14 NOTE — PROGRESS NOTES
Subjective:      Patient ID: Richard Bustos is a 72 y.o. male.    Chief Complaint: Foot Ulcer (left leg ) and Nail Care    Leg ulcer left.  Sudden onset hitting leg on walker, minmal improvement past 1-2 weeks,  aggravated by increased weight bearing, contact with clothing, pressure.  No previous medical treatment.  OTC pain med not helping.  Denies signs infection.    Review of Systems   Constitution: Negative for chills, diaphoresis, fever, malaise/fatigue and night sweats.   Cardiovascular: Positive for leg swelling. Negative for claudication, cyanosis and syncope.   Skin: Positive for poor wound healing. Negative for color change, dry skin, rash, suspicious lesions and unusual hair distribution.   Musculoskeletal: Negative for falls, joint pain, joint swelling, muscle cramps, muscle weakness and stiffness.   Gastrointestinal: Negative for constipation, diarrhea, nausea and vomiting.   Neurological: Positive for sensory change. Negative for brief paralysis, disturbances in coordination, focal weakness, numbness, paresthesias and tremors.           Objective:      Physical Exam   Constitutional: He appears well-developed and well-nourished. He is cooperative. No distress.   Cardiovascular:   Pulses:       Popliteal pulses are 2+ on the right side, and 2+ on the left side.        Dorsalis pedis pulses are 1+ on the right side, and 1+ on the left side.        Posterior tibial pulses are 1+ on the right side, and 1+ on the left side.   All toes warm, pink.    2+ pitting edema bilateral legs.   Musculoskeletal:        Right ankle: Normal. He exhibits normal range of motion, no swelling, no ecchymosis, no deformity, no laceration and normal pulse. Achilles tendon normal. Achilles tendon exhibits no pain, no defect and normal Chung's test results.   Normal angle, base, station of gait. All ten toes without clubbing, cyanosis, or signs of ischemia.  No pain to palpation bilateral lower extremities.  Range of motion,  stability, muscle strength, and muscle tone normal bilateral feet and legs.     Lymphadenopathy: No inguinal adenopathy noted on the right or left side.   Negative lymphadenopathy bilateral popliteal fossa and tarsal tunnel.    Negative streaking left foot/leg   Neurological: He is alert. He has normal strength. He displays no atrophy and no tremor. A sensory deficit is present. He exhibits normal muscle tone. He displays no seizure activity. Gait normal.   Reflex Scores:       Patellar reflexes are 2+ on the right side and 2+ on the left side.       Achilles reflexes are 2+ on the right side and 2+ on the left side.  Decreased/absent vibratory sensation bilateral feet to 128Hz tuning fork.  Paresthesias, and burning bilateral feet with no clearly identified trigger or source.     Skin: Skin is warm, dry and intact. No abrasion, no bruising, no burn, no ecchymosis, no laceration, no lesion and no rash noted. He is not diaphoretic. No cyanosis or erythema. No pallor. Nails show no clubbing.   Skin thin, shiny, atrophic, with decreased density and distribution of pedal hair bilateral, but without hyperpigmentation, minerva discoloration,  ulcers, masses, nodules or cords palpated bilateral feet and legs.    Wound anterior distal left leg closed today, epithelialized  without ulceration, drainage, pus, tracking, fluctuance, malodor, or cardinal signs infection.    Toenails 1st, 2nd, 3rd, 4th, 5th  bilateral are hypertrophic thickened 2-3 mm, dystrophic, discolored tanish brown with tan, gray crumbly subungual debris.  Tender to distal nail plate pressure, without periungual skin abnormality of each.               Assessment:       Encounter Diagnoses   Name Primary?    Leg ulcer, left, limited to breakdown of skin Yes    Peripheral vascular disease     Diabetic polyneuropathy associated with type 2 diabetes mellitus     Onychomycosis due to dermatophyte          Plan:       Richard was seen today for foot ulcer and nail  care.    Diagnoses and all orders for this visit:    Leg ulcer, left, limited to breakdown of skin    Peripheral vascular disease    Diabetic polyneuropathy associated with type 2 diabetes mellitus    Onychomycosis due to dermatophyte      I counseled the patient on his conditions, their implications and medical management.    Inspect both feet/ankles/legs multiple times daily for signs occurrence/recurrence ulceration/infection.    - Shoe inspection. Diabetic Foot Education. Patient reminded of the importance of good nutrition and blood sugar control to help prevent podiatric complications of diabetes. Patient instructed on proper foot hygeine. We discussed wearing proper shoe gear, daily foot inspections, never walking without protective shoe gear, never putting sharp instruments to feet, routine podiatric visits at least annually.      With the patient's permission, I debrided all ten toenails with a sterile nipper and curette, removing all offending nail and debris.  Patient tolerated the procedure well and related significant relief.                Return in about 3 months (around 12/14/2017) for Ar care.

## 2017-09-14 NOTE — PROGRESS NOTES
Referring Physician: No ref. provider found    PCP: Familia Ramirez Jr, MD      CC: Lower back pain    Interval history:  Mr. Bustos is a 72 y.o. male with low back pain who presents today for f/u s/p lumbar MB RFA at L3, 4, 5. Reports 80% relief of low back pain. He is happy with his results. He does reports LE weakness without b/b changes. Denies any falls. Does not wish to go to PT at this time. Walks with a cane. Pain today is rated 6/10.  Pt has been seen in the clinic before, however pt is new to me.     History below per Dr. Causey    HPI:   Richard Bustos is a 72 y.o. male referred to us for lower back pain.  Pain has gradually worsened over past 2 months.  No recent traumatic incident.  His intermittent aching, burning, throbbing pain in his lower back.  Pain occasionally travels down his posterior thighs into his posterior calves.  Pain worsens standing, walking, lifting and getting up.  Pain improves with rest.  He does have history of bilateral hip replacements and has been evaluated by orthopedics.  He was told that his pain may be due to his lumbar spine.  He has tried physical therapy with minimal benefit.  He recently had updated lumbar MRI.  He denies any weakness.  No bowel bladder changes.  Lower back pain is more bothersome.  He rates his pain 6/10 today.    ROS:  CONSTITUTIONAL: No fevers, chills, night sweats, wt. loss, appetite changes  SKIN: no rashes or itching  ENT: No headaches, head trauma, vision changes, or eye pain  LYMPH NODES: None noticed   CV: No chest pain, palpitations.   RESP: No shortness of breath, dyspnea on exertion, cough, wheezing, or hemoptysis  GI: No nausea, emesis, diarrhea, constipation, melena, hematochezia, pain.    : No dysuria, hematuria, urgency, or frequency   HEME: No easy bruising, bleeding problems  PSYCHIATRIC: No depression, anxiety, psychosis, hallucinations.  NEURO: No seizures, memory loss, dizziness or difficulty sleeping  MSK: + History of present  illness      Past Medical History:   Diagnosis Date    Anemia     Anemia of other chronic disease 9/13/2017    Anemia, chronic renal failure 9/13/2017    Anemia, unspecified 9/13/2017    Anticoagulant long-term use     Aorta aneurysm     Arthritis     Atrial fibrillation     CAD (coronary artery disease)     CHF (congestive heart failure)     Chronic kidney disease     Clotting disorder 9/13/2017    Colon polyp     Diabetes mellitus     not on meds    Diabetes mellitus type II     Diverticulosis     Elevated PSA     Encounter for blood transfusion     Former smoker     GERD (gastroesophageal reflux disease)     Gout     Hx of colonic polyps     Hypercoagulable state     Hyperlipidemia     Hypertension     MI, old 2010    Myocardial infarction     9/2010    Prostate cancer 06/2016    Sleep apnea     Venous stasis     Chronic     Past Surgical History:   Procedure Laterality Date    ABDOMINAL SURGERY      CARDIAC SURGERY      COLONOSCOPY  02/2011    diverticulosis with diverticula with clot, no active bleeding    COLONOSCOPY N/A 8/24/2016    Procedure: COLONOSCOPY;  Surgeon: Saroj Borjas MD;  Location[facility];  Service: Endoscopy;  Laterality: N/A;    GASTRIC BYPASS  2/5/2008    IVC FILTER RETRIEVAL      JOINT REPLACEMENT  1996 and 2001    bi-lat hip replacement/Rt Hip and Lt Hip    ROTATOR CUFF REPAIR      Rt shoulder    Stents  8/18/2010    x 3    UPPER GASTROINTESTINAL ENDOSCOPY  02/2011     Family History   Problem Relation Age of Onset    Heart attack Mother     Heart attack Father     Heart attack Brother     Ulcerative colitis Daughter 35    Colon cancer Neg Hx     Colon polyps Neg Hx     Crohn's disease Neg Hx      Social History     Social History    Marital status:      Spouse name: N/A    Number of children: N/A    Years of education: N/A     Social History Main Topics    Smoking status: Former Smoker    Smokeless tobacco: Never Used       "Comment: 45 yrs ago, only smoked 3-4 packs in his entire life as a teenager    Alcohol use Yes      Comment: rare    Drug use: No    Sexual activity: Not Asked     Other Topics Concern    None     Social History Narrative    None         Medications/Allergies: See med card    Vitals:    09/14/17 0945   BP: (!) 146/81   Pulse: (!) 54   Weight: (!) 136.5 kg (301 lb)   Height: 5' 9" (1.753 m)   PainSc:   6   PainLoc: Back         Physical exam:    GENERAL: A and O x3, the patient appears well groomed and is in no acute distress.  Skin: No rashes or obvious lesions  HEENT: normocephalic, atraumatic  CARDIOVASCULAR:  Bradycardia  LUNGS: non labored breathing  ABDOMEN: soft, nontender   UPPER EXTREMITIES: Normal alignment, normal range of motion, no atrophy, no skin changes,  hair growth and nail growth normal and equal bilaterally. No swelling, no tenderness.    LOWER EXTREMITIES:  Normal alignment, normal range of motion, no atrophy, no skin changes,  hair growth and nail growth normal and equal bilaterally. No swelling, no tenderness.    LUMBAR SPINE  Lumbar spine: ROM is limited with flexion extension and oblique extension with moderate increased pain.    Parviz's test causes no increased pain on either side.    Supine straight leg raise is negative bilaterally.    Internal and external rotation of the hip causes no increased pain on either side.  Myofascial exam: No tenderness to palpation across lumbar paraspinous muscles.      MENTAL STATUS: normal orientation, speech, language, and fund of knowledge for social situation.  Emotional state appropriate.    CRANIAL NERVES:  II:  PERRL bilaterally,   III,IV,VI: EOMI.    V:  Facial sensation equal bilaterally  VII:  Facial motor function normal.  VIII:  Hearing equal to finger rub bilaterally  IX/X: Gag normal, palate symmetric  XI:  Shoulder shrug equal, head turn equal  XII:  Tongue midline without fasciculations      MOTOR: Tone and bulk: normal bilateral upper " and lower Strength: normal   Delt Bi Tri WE WF     R 5 5 5 5 5 5   L 5 5 5 5 5 5     IP ADD ABD Quad TA Gas HAM  R 5 5 5 4 5 5 5  L 5 5 5 4 5 5 5    SENSATION: Light touch and pinprick intact bilaterally  REFLEXES: normal, symmetric, nonbrisk.  Toes down, no clonus. No hoffmans.  GAIT: normal rise, base, steps, and arm swing.        Imaging:  Requesting    Assessment:  Mr. Bustos is a 72 y.o. male with back pain  1. Facet arthropathy, lumbar    2. DDD (degenerative disc disease), lumbar          Plan:  1.  I have stressed the importance of physical activity and exercise to improve overall health  2. Monitor progress and consider repeat lumbar RFA in the future  3. Recommend referral to PT for subjective LE weakness. He will consider this in the future  4. F/u prn

## 2017-09-17 RX ORDER — MECOBAL/LEVOMEFOLAT CA/B6 PHOS 2-3-35 MG
TABLET ORAL
Qty: 180 TABLET | Refills: 3 | Status: SHIPPED | OUTPATIENT
Start: 2017-09-17 | End: 2018-10-15 | Stop reason: SDUPTHER

## 2017-09-18 ENCOUNTER — DOCUMENTATION ONLY (OUTPATIENT)
Dept: FAMILY MEDICINE | Facility: CLINIC | Age: 72
End: 2017-09-18

## 2017-09-18 NOTE — PROGRESS NOTES
Pre-Visit Chart Review  For Appointment Scheduled on 09/19/2017    Health Maintenance Due   Topic Date Due    Eye Exam  09/04/1955    Influenza Vaccine  08/01/2017    Hemoglobin A1c  08/01/2017    Lipid Panel  08/08/2017

## 2017-09-19 ENCOUNTER — HOSPITAL ENCOUNTER (OUTPATIENT)
Dept: RADIOLOGY | Facility: CLINIC | Age: 72
Discharge: HOME | End: 2017-09-19
Attending: PHYSICIAN ASSISTANT
Payer: MEDICARE

## 2017-09-19 ENCOUNTER — OFFICE VISIT (OUTPATIENT)
Dept: FAMILY MEDICINE | Facility: CLINIC | Age: 72
End: 2017-09-19
Payer: MEDICARE

## 2017-09-19 VITALS
DIASTOLIC BLOOD PRESSURE: 64 MMHG | WEIGHT: 305.13 LBS | SYSTOLIC BLOOD PRESSURE: 105 MMHG | HEART RATE: 62 BPM | OXYGEN SATURATION: 91 % | BODY MASS INDEX: 45.19 KG/M2 | TEMPERATURE: 98 F | HEIGHT: 69 IN

## 2017-09-19 DIAGNOSIS — R05.9 COUGH: ICD-10-CM

## 2017-09-19 DIAGNOSIS — I50.33 ACUTE ON CHRONIC DIASTOLIC CONGESTIVE HEART FAILURE: Primary | ICD-10-CM

## 2017-09-19 DIAGNOSIS — E11.21 TYPE 2 DIABETES MELLITUS WITH DIABETIC NEPHROPATHY, WITHOUT LONG-TERM CURRENT USE OF INSULIN: ICD-10-CM

## 2017-09-19 DIAGNOSIS — N18.30 STAGE 3 CHRONIC KIDNEY DISEASE: ICD-10-CM

## 2017-09-19 DIAGNOSIS — J20.9 ACUTE BRONCHITIS, UNSPECIFIED ORGANISM: ICD-10-CM

## 2017-09-19 PROCEDURE — 71020 XR CHEST PA AND LATERAL: CPT | Mod: 26,,, | Performed by: RADIOLOGY

## 2017-09-19 PROCEDURE — 1125F AMNT PAIN NOTED PAIN PRSNT: CPT | Mod: S$GLB,,, | Performed by: PHYSICIAN ASSISTANT

## 2017-09-19 PROCEDURE — 99214 OFFICE O/P EST MOD 30 MIN: CPT | Mod: S$GLB,,, | Performed by: PHYSICIAN ASSISTANT

## 2017-09-19 PROCEDURE — 3078F DIAST BP <80 MM HG: CPT | Mod: S$GLB,,, | Performed by: PHYSICIAN ASSISTANT

## 2017-09-19 PROCEDURE — 3074F SYST BP LT 130 MM HG: CPT | Mod: S$GLB,,, | Performed by: PHYSICIAN ASSISTANT

## 2017-09-19 PROCEDURE — 71020 XR CHEST PA AND LATERAL: CPT | Mod: TC,PO

## 2017-09-19 PROCEDURE — 1159F MED LIST DOCD IN RCRD: CPT | Mod: S$GLB,,, | Performed by: PHYSICIAN ASSISTANT

## 2017-09-19 PROCEDURE — 99499 UNLISTED E&M SERVICE: CPT | Mod: S$PBB,,, | Performed by: PHYSICIAN ASSISTANT

## 2017-09-19 PROCEDURE — 99999 PR PBB SHADOW E&M-EST. PATIENT-LVL III: CPT | Mod: PBBFAC,,, | Performed by: PHYSICIAN ASSISTANT

## 2017-09-19 PROCEDURE — 4010F ACE/ARB THERAPY RXD/TAKEN: CPT | Mod: S$GLB,,, | Performed by: PHYSICIAN ASSISTANT

## 2017-09-19 PROCEDURE — 3008F BODY MASS INDEX DOCD: CPT | Mod: S$GLB,,, | Performed by: PHYSICIAN ASSISTANT

## 2017-09-19 RX ORDER — DOXYCYCLINE 100 MG/1
100 CAPSULE ORAL EVERY 12 HOURS
Qty: 20 CAPSULE | Refills: 0 | Status: SHIPPED | OUTPATIENT
Start: 2017-09-19 | End: 2017-10-05 | Stop reason: ALTCHOICE

## 2017-09-19 RX ORDER — PROMETHAZINE HYDROCHLORIDE AND DEXTROMETHORPHAN HYDROBROMIDE 6.25; 15 MG/5ML; MG/5ML
5 SYRUP ORAL EVERY 6 HOURS PRN
Qty: 120 ML | Refills: 0 | Status: SHIPPED | OUTPATIENT
Start: 2017-09-19 | End: 2017-09-29

## 2017-09-19 NOTE — PROGRESS NOTES
Subjective:       Patient ID: Richard Bsutos is a 72 y.o. male.    Chief Complaint: Cough and Chest Congestion    Patient with CAD, Chronic diastolic dysfunction, CKD stage 3, a fibrillation of coumadin presents for follow up of cough.  He was seen last week for same.  Chest xray was normal.  He was given decadron IM and is using nebulized Atrovent every 4-6 hours.  He is taking mucinex for cough but His cough has not improved.  He is feeling worse.  He complains of increased fatigue.  He has not had a fever.  He has gained 5 lbs in 1 week.  He denies any worsening of his chronic LE edema.  He uses Bipap qhs but reports 2 pillow orthopnea.       Review of Systems   Constitutional: Positive for activity change and fatigue. Negative for appetite change, chills, diaphoresis, fever and unexpected weight change.   HENT: Positive for congestion and rhinorrhea. Negative for postnasal drip and sore throat.    Respiratory: Positive for apnea, cough and shortness of breath. Negative for choking, chest tightness, wheezing and stridor.    Cardiovascular: Positive for leg swelling. Negative for chest pain and palpitations.   Gastrointestinal: Negative for abdominal pain, nausea and vomiting.   Genitourinary: Negative for difficulty urinating and frequency.   Neurological: Negative for dizziness, light-headedness and headaches.       Objective:      Physical Exam   Constitutional: He appears well-developed and well-nourished. No distress.   HENT:   Head: Normocephalic and atraumatic.   Right Ear: Tympanic membrane, external ear and ear canal normal.   Left Ear: Tympanic membrane, external ear and ear canal normal.   Nose: No mucosal edema or rhinorrhea.   Mouth/Throat: No oropharyngeal exudate, posterior oropharyngeal edema or posterior oropharyngeal erythema.   Cardiovascular: Normal rate, regular rhythm and normal heart sounds.    Pulmonary/Chest: Effort normal and breath sounds normal. He has no wheezes. He has no rales.    Lymphadenopathy:        Head (right side): No tonsillar adenopathy present.        Head (left side): No tonsillar adenopathy present.     He has no cervical adenopathy.   Vitals reviewed.      Assessment:       1. Acute on chronic diastolic congestive heart failure    2. Acute bronchitis, unspecified organism    3. Cough        Plan:       Richard was seen today for cough and chest congestion.    Diagnoses and all orders for this visit:    Acute on chronic diastolic congestive heart failure  -     Brain natriuretic peptide; Future  -     CBC auto differential; Future  -     Comprehensive metabolic panel; Future  Increase lasix to 40 mg bid X 3 days then resume qd dose  1 week follow up   ER if symptoms worsen   Acute bronchitis, unspecified organism  -     doxycycline (VIBRAMYCIN) 100 MG Cap; Take 1 capsule (100 mg total) by mouth every 12 (twelve) hours.  Continue nebs    Cough  -     X-Ray Chest PA And Lateral; Future    Other orders  -     promethazine-dextromethorphan (PROMETHAZINE-DM) 6.25-15 mg/5 mL Syrp; Take 5 mLs by mouth every 6 (six) hours as needed.

## 2017-09-25 ENCOUNTER — DOCUMENTATION ONLY (OUTPATIENT)
Dept: FAMILY MEDICINE | Facility: CLINIC | Age: 72
End: 2017-09-25

## 2017-09-25 NOTE — PROGRESS NOTES
Pre-Visit Chart Review  For Appointment Scheduled on 09/26/2017    Health Maintenance Due   Topic Date Due    Eye Exam  09/04/1955    Influenza Vaccine  08/01/2017

## 2017-09-26 ENCOUNTER — OFFICE VISIT (OUTPATIENT)
Dept: FAMILY MEDICINE | Facility: CLINIC | Age: 72
End: 2017-09-26
Payer: MEDICARE

## 2017-09-26 ENCOUNTER — DOCUMENTATION ONLY (OUTPATIENT)
Dept: FAMILY MEDICINE | Facility: CLINIC | Age: 72
End: 2017-09-26

## 2017-09-26 VITALS
SYSTOLIC BLOOD PRESSURE: 122 MMHG | HEIGHT: 69 IN | WEIGHT: 303.56 LBS | TEMPERATURE: 99 F | DIASTOLIC BLOOD PRESSURE: 66 MMHG | OXYGEN SATURATION: 91 % | BODY MASS INDEX: 44.96 KG/M2 | HEART RATE: 66 BPM

## 2017-09-26 DIAGNOSIS — N18.30 ANEMIA, CHRONIC RENAL FAILURE, STAGE 3 (MODERATE): ICD-10-CM

## 2017-09-26 DIAGNOSIS — I50.33 ACUTE ON CHRONIC DIASTOLIC CONGESTIVE HEART FAILURE: ICD-10-CM

## 2017-09-26 DIAGNOSIS — D63.1 ANEMIA, CHRONIC RENAL FAILURE, STAGE 3 (MODERATE): ICD-10-CM

## 2017-09-26 DIAGNOSIS — N18.30 CKD (CHRONIC KIDNEY DISEASE) STAGE 3, GFR 30-59 ML/MIN: ICD-10-CM

## 2017-09-26 DIAGNOSIS — J20.9 ACUTE BRONCHITIS, UNSPECIFIED ORGANISM: Primary | ICD-10-CM

## 2017-09-26 DIAGNOSIS — R05.9 COUGH: ICD-10-CM

## 2017-09-26 PROCEDURE — 99214 OFFICE O/P EST MOD 30 MIN: CPT | Mod: 25,S$GLB,, | Performed by: PHYSICIAN ASSISTANT

## 2017-09-26 PROCEDURE — 3074F SYST BP LT 130 MM HG: CPT | Mod: S$GLB,,, | Performed by: PHYSICIAN ASSISTANT

## 2017-09-26 PROCEDURE — 1159F MED LIST DOCD IN RCRD: CPT | Mod: S$GLB,,, | Performed by: PHYSICIAN ASSISTANT

## 2017-09-26 PROCEDURE — 3008F BODY MASS INDEX DOCD: CPT | Mod: S$GLB,,, | Performed by: PHYSICIAN ASSISTANT

## 2017-09-26 PROCEDURE — 99499 UNLISTED E&M SERVICE: CPT | Mod: S$PBB,,, | Performed by: PHYSICIAN ASSISTANT

## 2017-09-26 PROCEDURE — 3078F DIAST BP <80 MM HG: CPT | Mod: S$GLB,,, | Performed by: PHYSICIAN ASSISTANT

## 2017-09-26 PROCEDURE — 1125F AMNT PAIN NOTED PAIN PRSNT: CPT | Mod: S$GLB,,, | Performed by: PHYSICIAN ASSISTANT

## 2017-09-26 PROCEDURE — 99999 PR PBB SHADOW E&M-EST. PATIENT-LVL V: CPT | Mod: PBBFAC,,, | Performed by: PHYSICIAN ASSISTANT

## 2017-09-26 PROCEDURE — 94640 AIRWAY INHALATION TREATMENT: CPT | Mod: S$GLB,,, | Performed by: PHYSICIAN ASSISTANT

## 2017-09-26 RX ORDER — LEVALBUTEROL INHALATION SOLUTION 1.25 MG/3ML
1.25 SOLUTION RESPIRATORY (INHALATION)
Status: COMPLETED | OUTPATIENT
Start: 2017-09-26 | End: 2017-09-26

## 2017-09-26 RX ADMIN — LEVALBUTEROL INHALATION SOLUTION 1.25 MG: 1.25 SOLUTION RESPIRATORY (INHALATION) at 10:09

## 2017-09-26 NOTE — PROGRESS NOTES
Pre-Visit Chart Review  For Appointment Scheduled on (date) 10/5/17    Health Maintenance Due   Topic Date Due    Eye Exam  09/04/1955    Influenza Vaccine  08/01/2017

## 2017-09-26 NOTE — PATIENT INSTRUCTIONS
Walking for Fitness  Fitness walking has something for everyone, even people who are already fit. Walking is one of the safest ways to condition your body aerobically. It can boost energy, help you lose weight, and reduce stress.    Physical benefits  · Walking strengthens your heart and lungs, and tones your muscles.  · When walking, your feet land with less impact than in other sports. This reduces chances of muscle, bone, and joint injury.  · Regular walking improves your cholesterol levels and lowers your risk of heart disease. And it helps you control your blood sugar if you have diabetes.  · Walking is a weight-bearing activity, which helps maintain bone density. This can help prevent osteoporosis.  Personal rewards  · Taking walks can help you relax and manage stress. And fitness walking may make you feel better about yourself.  · Walking can help you sleep better at night and make you less likely to be depressed.  · Regular walking may help maintain your memory as you get older.  · Walking is a great way to spend extra time with friends and family members. Be sure to invite your dog along!  Q&A about fitness walking  Q: Will walking keep me fit?  A: Yes. Regular walking at the right pace gives you all the benefits of other aerobic activities, such as jogging and swimming.  Q: Will walking help me lose weight and keep it off?  A: Yes. Per mile, walking can burn as many calories as jogging. Your health care provider can help work walking into your weight-loss plan.  Q: Is walking safe for my health?  A: Yes. Walking is safe if you have high blood pressure, diabetes, heart disease, or other conditions. Talk to your healthcare provider before you start.  Date Last Reviewed: 4/1/2017 © 2000-2017 [employer]. [address] [place] [address], [place] [address]. All rights reserved. This information is not intended as a substitute for professional medical care. Always follow your healthcare professional's  instructions.        Controlling High Blood Pressure  High blood pressure (hypertension) is often called the silent killer. This is because many people who have it dont know it. High blood pressure is defined as 140/90 mm Hg or higher. Know your blood pressure and remember to check it regularly. Doing so can save your life. Here are some things you can do to help control your blood pressure.    Choose heart-healthy foods  · Select low-salt, low-fat foods. Limit sodium intake to 2,400 mg per day or the amount suggested by your healthcare provider.  · Limit canned, dried, cured, packaged, and fast foods. These can contain a lot of salt.  · Eat 8 to 10 servings of fruits and vegetables every day.  · Choose lean meats, fish, or chicken.  · Eat whole-grain pasta, brown rice, and beans.  · Eat 2 to 3 servings of low-fat or fat-free dairy products.  · Ask your doctor about the DASH eating plan. This plan helps reduce blood pressure.  · When you go to a restaurant, ask that your meal be prepared with no added salt.  Maintain a healthy weight  · Ask your healthcare provider how many calories to eat a day. Then stick to that number.  · Ask your healthcare provider what weight range is healthiest for you. If you are overweight, a weight loss of only 3% to 5% of your body weight can help lower blood pressure. Generally, a good weight loss goal is to lose 10% of your body weight in a year.  · Limit snacks and sweets.  · Get regular exercise.  Get up and get active  · Choose activities you enjoy. Find ones you can do with friends or family. This includes bicycling, dancing, walking, and jogging.  · Park farther away from building entrances.  · Use stairs instead of the elevator.  · When you can, walk or bike instead of driving.  · [place] leaves, garden, or do household repairs.  · Be active at a moderate to vigorous level of physical activity for at least 40 minutes for a minimum of 3 to 4 days a week.   Manage stress  · Make time  to relax and enjoy life. Find time to laugh.  · Communicate your concerns with your loved ones and your healthcare provider.  · Visit with family and friends, and keep up with hobbies.  Limit alcohol and quit smoking  · Men should have no more than 2 drinks per day.  · Women should have no more than 1 drink per day.  · Talk with your healthcare provider about quitting smoking. Smoking significantly increases your risk for heart disease and stroke. Ask your healthcare provider about community smoking cessation programs and other options.  Medicines  If lifestyle changes arent enough, your healthcare provider may prescribe high blood pressure medicine. Take all medicines as prescribed. If you have any questions about your medicines, ask your healthcare provider before stopping or changing them.   Date Last Reviewed: 4/27/2016  © 2435-3389 [employer]. [address] [place] [address], [place] [address]. All rights reserved. This information is not intended as a substitute for professional medical care. Always follow your healthcare professional's instructions.        Understanding Carbohydrates, Fats, and Protein  Food is a source of fuel and nourishment for your body. Its also a source of pleasure. Having diabetes doesnt mean you have to eat special foods or give up desserts. Instead, your dietitian can show you how to plan meals to suit your body. To start, learn how different foods affect blood sugar.  Carbohydrates  Carbohydrates are the main source of fuel for the body. Carbohydrates raise blood sugar. Many people think carbohydrates are only found in pasta or bread. But carbohydrates are actually in many kinds of foods:  · Sugars occur naturally in foods such as fruit, milk, honey, and molasses. Sugars can also be added to many foods, from cereals and yogurt to candy and desserts. Sugars raise blood sugar.  · Starches are found in bread, cereals, pasta, and dried beans. Theyre also found in corn, peas,  potatoes, yam, acorn squash, and butternut squash. Starches also raise blood sugar.   · Fiber is found in foods such as vegetables, fruits, beans, and whole grains. Unlike other carbs, fiber isnt digested or absorbed. So it doesnt raise blood sugar. In fact, fiber can help keep blood sugar from rising too fast. It also helps keep blood cholesterol at a healthy level.  Did you know?  Even though carbohydrates raise blood sugar, its best to have some in every meal. They are an important part of a healthy diet.   Fat  Fat is an energy source that can be stored until needed. Fat does not raise blood sugar. However, it can raise blood cholesterol, increasing the risk of heart disease. Fat is also high in calories, which can cause weight gain. Not all types of fat are the same.  More Healthy:  · Monounsaturated fats are mostly found in vegetable oils, such as olive, canola, and peanut oils. They are also found in avocados and some nuts. Monounsaturated fats are healthy for your heart. Thats because they lower LDL (unhealthy) cholesterol.  · Polyunsaturated fats are mostly found in vegetable oils, such as corn, safflower, and soybean oils. They are also found in some seeds, nuts, and fish. Polyunsaturated fats lower LDL (unhealthy) cholesterol. So, choosing them instead of saturated fats is healthy for your heart. Certain unsaturated fats can help lower triglycerides.   Less Healthy:  · Saturated fats are found in animal products, such as meat, poultry, whole milk, lard, and butter. Saturated fats raise LDL cholesterol and are not healthy for your heart.  · Hydrogenated oils and trans fats are formed when vegetable oils are processed into solid fats. They are found in many processed foods. Hydrogenated oils and trans fats raise LDL cholesterol and lower HDL (healthy) cholesterol. They are not healthy for your heart.  Protein  Protein helps the body build and repair muscle and other tissue. Protein has little or no  effect on blood sugar. However, many foods that contain protein also contain saturated fat. By choosing low-fat protein sources, you can get the benefits of protein without the extra fat:  · Plant protein is found in dry beans and peas, nuts, and soy products, such as tofu and soymilk. These sources tend to be cholesterol-free and low in saturated fat.  · Animal protein is found in fish, poultry, meat, cheese, milk, and eggs. These contain cholesterol and can be high in saturated fat. Aim for lean, lower-fat choices.  Date Last Reviewed: 3/1/2016  © 2503-2846 [employer]. [address] [place] [address], [place] [address]. All rights reserved. This information is not intended as a substitute for professional medical care. Always follow your healthcare professional's instructions.

## 2017-09-26 NOTE — PROGRESS NOTES
Subjective:       Patient ID: Richard Bustos is a 72 y.o. male.    Chief Complaint: Cough (x 1 week)    Patient with CAD, CHF, CKD stage 3 presents for follow up of acute exacerbation of CHF and bronchitis.  He reports improved cough and chest congestion.  He is on antibiotics as prescribed.  He is using nebulizer about twice a day.  He took lasix bid X 3 days and weight decreased by 3 pounds in 1 week.  He has no other complaints at this time.        Cough   Associated symptoms include shortness of breath. Pertinent negatives include no chest pain, chills, ear pain, fever, headaches, rash, rhinorrhea, sore throat or wheezing.     Review of Systems   Constitutional: Negative for activity change, appetite change, chills, diaphoresis, fatigue and fever.   HENT: Negative for congestion, ear pain, rhinorrhea, sinus pain, sinus pressure and sore throat.    Respiratory: Positive for cough and shortness of breath. Negative for chest tightness and wheezing.    Cardiovascular: Negative for chest pain, palpitations and leg swelling.   Gastrointestinal: Negative for abdominal pain, diarrhea, nausea and vomiting.   Genitourinary: Negative for difficulty urinating.   Musculoskeletal: Negative for arthralgias.   Skin: Negative for rash.   Neurological: Negative for dizziness, light-headedness and headaches.       Objective:      Physical Exam   Constitutional: He appears well-developed and well-nourished. No distress.   HENT:   Head: Normocephalic and atraumatic.   Right Ear: Tympanic membrane, external ear and ear canal normal.   Left Ear: Tympanic membrane, external ear and ear canal normal.   Nose: No mucosal edema or rhinorrhea.   Mouth/Throat: No oropharyngeal exudate, posterior oropharyngeal edema or posterior oropharyngeal erythema.   Cardiovascular: Normal rate, regular rhythm and normal heart sounds.    Pulmonary/Chest: Effort normal. He has no wheezes. He has rhonchi (not cleared with cough ). He has no rales.    Lymphadenopathy:        Head (right side): No tonsillar adenopathy present.        Head (left side): No tonsillar adenopathy present.     He has no cervical adenopathy.   Vitals reviewed.      Assessment:       1. Acute bronchitis, unspecified organism, improved not resolved     2. Acute on chronic diastolic congestive heart failure, improved     3. Cough, improved     4. CKD (chronic kidney disease) stage 3, GFR 30-59 ml/min, 41- stable     5. Anemia, chronic renal failure, stage 3 (moderate), stable         Plan:       Richard was seen today for cough.    Diagnoses and all orders for this visit:    Acute bronchitis, unspecified organism, improved not resolved   Complete doxycycline as prescribed   Increase nebs to tid   Consider PFTs.   Acute on chronic diastolic congestive heart failure, improved   Continue current medications   Cough, improved   Continue as is   CKD (chronic kidney disease) stage 3, GFR 30-59 ml/min, 41- stable   Anemia, chronic renal failure, stage 3 (moderate), stable   Continue as is   Other orders  -     levalbuterol nebulizer solution 1.25 mg; Take 3 mLs (1.25 mg total) by nebulization one time.    Induced cough and lung exam post tussive was clear bilaterally   I suspected he has some component of obesity hypoventilation.  Deep breathing exercises encouraged     follow up PCP as scheduled

## 2017-10-05 ENCOUNTER — OFFICE VISIT (OUTPATIENT)
Dept: FAMILY MEDICINE | Facility: CLINIC | Age: 72
End: 2017-10-05
Payer: MEDICARE

## 2017-10-05 VITALS
RESPIRATION RATE: 18 BRPM | DIASTOLIC BLOOD PRESSURE: 62 MMHG | WEIGHT: 302.94 LBS | HEIGHT: 70 IN | TEMPERATURE: 99 F | BODY MASS INDEX: 43.37 KG/M2 | HEART RATE: 51 BPM | SYSTOLIC BLOOD PRESSURE: 107 MMHG

## 2017-10-05 DIAGNOSIS — N13.8 BPH WITH OBSTRUCTION/LOWER URINARY TRACT SYMPTOMS: ICD-10-CM

## 2017-10-05 DIAGNOSIS — I25.10 CORONARY ARTERY DISEASE INVOLVING NATIVE CORONARY ARTERY OF NATIVE HEART WITHOUT ANGINA PECTORIS: Chronic | ICD-10-CM

## 2017-10-05 DIAGNOSIS — E11.42 DIABETIC POLYNEUROPATHY ASSOCIATED WITH TYPE 2 DIABETES MELLITUS: ICD-10-CM

## 2017-10-05 DIAGNOSIS — F41.9 ANXIETY: ICD-10-CM

## 2017-10-05 DIAGNOSIS — N18.30 CKD (CHRONIC KIDNEY DISEASE) STAGE 3, GFR 30-59 ML/MIN: ICD-10-CM

## 2017-10-05 DIAGNOSIS — E11.22 TYPE 2 DIABETES MELLITUS WITH STAGE 3 CHRONIC KIDNEY DISEASE, WITH LONG-TERM CURRENT USE OF INSULIN: Primary | Chronic | ICD-10-CM

## 2017-10-05 DIAGNOSIS — Z79.4 TYPE 2 DIABETES MELLITUS WITH STAGE 3 CHRONIC KIDNEY DISEASE, WITH LONG-TERM CURRENT USE OF INSULIN: Primary | Chronic | ICD-10-CM

## 2017-10-05 DIAGNOSIS — E11.59 HYPERTENSION ASSOCIATED WITH DIABETES: ICD-10-CM

## 2017-10-05 DIAGNOSIS — C61 PROSTATE CANCER: ICD-10-CM

## 2017-10-05 DIAGNOSIS — N18.30 TYPE 2 DIABETES MELLITUS WITH STAGE 3 CHRONIC KIDNEY DISEASE, WITH LONG-TERM CURRENT USE OF INSULIN: Primary | Chronic | ICD-10-CM

## 2017-10-05 DIAGNOSIS — R19.00 ABDOMINAL WALL BULGE: ICD-10-CM

## 2017-10-05 DIAGNOSIS — I15.2 HYPERTENSION ASSOCIATED WITH DIABETES: ICD-10-CM

## 2017-10-05 DIAGNOSIS — Z15.89 MTHFR MUTATION (METHYLENETETRAHYDROFOLATE REDUCTASE): ICD-10-CM

## 2017-10-05 DIAGNOSIS — E11.40 TYPE 2 DIABETES MELLITUS WITH DIABETIC NEUROPATHY, WITHOUT LONG-TERM CURRENT USE OF INSULIN: ICD-10-CM

## 2017-10-05 DIAGNOSIS — D68.59 HYPERCOAGULABLE STATE: ICD-10-CM

## 2017-10-05 DIAGNOSIS — N40.1 BPH WITH OBSTRUCTION/LOWER URINARY TRACT SYMPTOMS: ICD-10-CM

## 2017-10-05 DIAGNOSIS — E78.2 MIXED HYPERLIPIDEMIA: ICD-10-CM

## 2017-10-05 LAB
INR PPP: 1.7
PROTHROMBIN TIME: 17.3 SEC (ref 9–11.5)

## 2017-10-05 PROCEDURE — 90662 IIV NO PRSV INCREASED AG IM: CPT | Mod: S$GLB,,, | Performed by: FAMILY MEDICINE

## 2017-10-05 PROCEDURE — 99214 OFFICE O/P EST MOD 30 MIN: CPT | Mod: 25,S$GLB,, | Performed by: FAMILY MEDICINE

## 2017-10-05 PROCEDURE — 99999 PR PBB SHADOW E&M-EST. PATIENT-LVL IV: CPT | Mod: PBBFAC,,, | Performed by: FAMILY MEDICINE

## 2017-10-05 PROCEDURE — G0008 ADMIN INFLUENZA VIRUS VAC: HCPCS | Mod: S$GLB,,, | Performed by: FAMILY MEDICINE

## 2017-10-05 PROCEDURE — 99499 UNLISTED E&M SERVICE: CPT | Mod: S$PBB,,, | Performed by: FAMILY MEDICINE

## 2017-10-05 NOTE — PATIENT INSTRUCTIONS
Weight Management: Getting Started  Healthy bodies come in all shapes and sizes. Not all bodies are made to be thin. For some people, a healthy weight is higher than the average weight listed on weight charts. Your healthcare provider can help you decide on a healthy weight for you.    Reasons to lose weight  Losing weight can help with some health problems, such as high blood pressure, heart disease, diabetes, sleep apnea, and arthritis. You may also feel more energy.  Set your long-term goal  Your goal doesn't even have to be a specific weight. You may decide on a fitness goal (such as being able to walk 10 miles a week), or a health goal (such as lowering your blood pressure). Choose a goal that is measurable and reasonable, so you know when you've reached it. A goal of reaching a BMI of less than 25 is not always reasonable (or possible).   Make an action plan  Habits dont change overnight. Setting your goals too high can leave you feeling discouraged if you cant reach them. Be realistic. Choose one or two small changes you can make now. Set an action plan for how you are going to make these changes. When you can stick to this plan, keep making a few more small changes. Taking small steps will help you stay on the path to success.  Track your progress  Write down your goals. Then, keep a daily record of your progress. Write down what you eat and how active you are. This record lets you look back on how much youve done. It may also help when youre feeling frustrated. Reward yourself for success. Even if you dont reach every goal, give yourself credit for what you do get done.  Get support  Encouragement from others can help make losing weight easier. Ask your family members and friends for support. They may even want to join you. Also look to your healthcare provider, registered dietitian, and  for help. Your local hospital can give you more information about nutrition, exercise, and  weight loss.  Date Last Reviewed: 1/31/2016 © 2000-2017 [employer]. [address] [place] [address], [place] [address]. All rights reserved. This information is not intended as a substitute for professional medical care. Always follow your healthcare professional's instructions.        Walking for Fitness  Fitness walking has something for everyone, even people who are already fit. Walking is one of the safest ways to condition your body aerobically. It can boost energy, help you lose weight, and reduce stress.    Physical benefits  · Walking strengthens your heart and lungs, and tones your muscles.  · When walking, your feet land with less impact than in other sports. This reduces chances of muscle, bone, and joint injury.  · Regular walking improves your cholesterol levels and lowers your risk of heart disease. And it helps you control your blood sugar if you have diabetes.  · Walking is a weight-bearing activity, which helps maintain bone density. This can help prevent osteoporosis.  Personal rewards  · Taking walks can help you relax and manage stress. And fitness walking may make you feel better about yourself.  · Walking can help you sleep better at night and make you less likely to be depressed.  · Regular walking may help maintain your memory as you get older.  · Walking is a great way to spend extra time with friends and family members. Be sure to invite your dog along!  Q&A about fitness walking  Q: Will walking keep me fit?  A: Yes. Regular walking at the right pace gives you all the benefits of other aerobic activities, such as jogging and swimming.  Q: Will walking help me lose weight and keep it off?  A: Yes. Per mile, walking can burn as many calories as jogging. Your health care provider can help work walking into your weight-loss plan.  Q: Is walking safe for my health?  A: Yes. Walking is safe if you have high blood pressure, diabetes, heart disease, or other conditions. Talk to your  healthcare provider before you start.  Date Last Reviewed: 4/1/2017  © 9395-8529 [employer]. [address] [place] [address], [place] [address]. All rights reserved. This information is not intended as a substitute for professional medical care. Always follow your healthcare professional's instructions.

## 2017-10-09 ENCOUNTER — ANTI-COAG VISIT (OUTPATIENT)
Dept: CARDIOLOGY | Facility: CLINIC | Age: 72
End: 2017-10-09

## 2017-10-09 DIAGNOSIS — D68.59 HYPERCOAGULABLE STATE: ICD-10-CM

## 2017-10-09 NOTE — PROGRESS NOTES
Subjective:       Patient ID: Richard Bustos is a 72 y.o. male.    Chief Complaint: Diabetes; Hypertension; Hyperlipidemia; and Coronary Artery Disease    Patient presents here for follow-up of multiple medical problems including diabetes, hypertension, hyperlipidemia, CAD, chronic kidney disease stage III, prostate cancer, and hypercoagulable state.  As far as his diabetes, this is well controlled and his most recent hemoglobin A1c was 6.7%.  He is up-to-date with his eye exams and he has no known retinopathy.  He does have diabetic neuropathy and nephropathy however.  He denies any hypoglycemia.  He remains on diet control ever since he had his gastric bypass surgery.  He is on both a statin and an ACE inhibitor also.  As far as his hypertension, this is also well controlled on his present medication.  As far as his hyperlipidemia this is under excellent control with his present dose of atorvastatin 80 mg daily.  His most recent lipid profile shows a total cholesterol of 117, HDL 42, LDL 51, triglycerides 118.  His coronary artery disease is stable without any recent chest pains or palpitations.  His chronic kidney disease is stable with a GFR of 43.  There has been no significant decline in the last year.  He also remains on Coumadin for his hypercoagulable state due to his MTHFR mutation.  He has not had any recent abnormal bleeding or significant bruising and his INR is stable.  His most recent diagnosis is prostate cancer for which she is being treated by both urology and oncology.  He also does have chronic anxiety for which he uses Xanax on a regular basis.  He is limited to using this only once a day. He also does have chronic pain syndrome secondary to back pain, osteoarthritis, and uses hydrocodone on a regular basis. He has seen and been evaluated by Pain Management. He has a pain contract signed.  He is up-to-date with all his routine screening exams and immunizations, the exception of his flu vaccine  for this year.  This will be done today.  He is complaining of some pain in his right lower quadrant and states that he is notice some abdominal bulging laterally.  This is in the area where he had a large hematoma in the past but he states that it feels different.  There has been no change in bowel habits and he denies any fever or chills.      Diabetes   He presents for his follow-up diabetic visit. He has type 2 diabetes mellitus. His disease course has been stable. Hypoglycemia symptoms include nervousness/anxiousness. Pertinent negatives for hypoglycemia include no dizziness or headaches. There are no diabetic associated symptoms. Pertinent negatives for diabetes include no chest pain, no fatigue, no polydipsia and no polyuria. Symptoms are stable. Pertinent negatives for diabetic complications include no CVA. Risk factors for coronary artery disease include diabetes mellitus, dyslipidemia, family history, hypertension, male sex, obesity, sedentary lifestyle and tobacco exposure. Current diabetic treatment includes oral agent (monotherapy). He is compliant with treatment most of the time. His weight is stable. He is following a diabetic, low fat/cholesterol and low salt diet. Meal planning includes ADA exchanges and avoidance of concentrated sweets. He has had a previous visit with a dietitian. He rarely participates in exercise. There is no change in his home blood glucose trend. An ACE inhibitor/angiotensin II receptor blocker is being taken. He sees a podiatrist.Eye exam is current.   Hypertension   This is a chronic problem. The problem is unchanged. The problem is controlled. Associated symptoms include neck pain. Pertinent negatives include no chest pain, headaches, palpitations or shortness of breath. Risk factors for coronary artery disease include diabetes mellitus, dyslipidemia, male gender, obesity, sedentary lifestyle and smoking/tobacco exposure. Past treatments include beta blockers and ACE  inhibitors. The current treatment provides moderate improvement. There are no compliance problems.  Hypertensive end-organ damage includes kidney disease and CAD/MI. There is no history of CVA or heart failure.   Hyperlipidemia   This is a chronic problem. The problem is controlled. Recent lipid tests were reviewed and are normal. Associated symptoms include myalgias. Pertinent negatives include no chest pain or shortness of breath. Current antihyperlipidemic treatment includes statins. The current treatment provides significant improvement of lipids. Compliance problems include adherence to exercise.  Risk factors for coronary artery disease include diabetes mellitus, dyslipidemia, hypertension and male sex.   Coronary Artery Disease   Presents for follow-up visit. Symptoms include leg swelling. Pertinent negatives include no chest pain, chest tightness, dizziness, palpitations or shortness of breath. Risk factors include hyperlipidemia. The symptoms have been stable. Compliance with diet is variable. Compliance with exercise is poor. Compliance with medications is good.     Review of Systems   Constitutional: Negative for chills, fatigue, fever and unexpected weight change.   HENT: Negative for congestion, ear pain, postnasal drip and sore throat.    Eyes: Negative for pain and visual disturbance.        Up to date with eye exams   Respiratory: Negative for cough, chest tightness and shortness of breath.    Cardiovascular: Positive for leg swelling. Negative for chest pain and palpitations.   Gastrointestinal: Negative for abdominal pain, constipation, diarrhea, nausea and vomiting.   Endocrine: Negative for polydipsia and polyuria.   Genitourinary: Negative for difficulty urinating, dysuria, flank pain and frequency.        Nocturia x 2-3 per night   Musculoskeletal: Positive for arthralgias, back pain, myalgias and neck pain.   Neurological: Negative for dizziness, light-headedness and headaches.    Hematological: Negative for adenopathy. Does not bruise/bleed easily.   Psychiatric/Behavioral: Negative for sleep disturbance. The patient is nervous/anxious.        Objective:      Physical Exam   Constitutional: He is oriented to person, place, and time.   Morbidly obese male in no distress at this time   HENT:   Head: Normocephalic and atraumatic.   Right Ear: External ear normal.   Left Ear: External ear normal.   Nose: Nose normal.   Mouth/Throat: Oropharynx is clear and moist.   Neck: Normal range of motion. Neck supple. No thyromegaly present.   Cardiovascular: Normal rate, regular rhythm, normal heart sounds and intact distal pulses.    No murmur heard.  Pulmonary/Chest: Effort normal and breath sounds normal. He has no wheezes. He has no rales.   Abdominal: Soft. Bowel sounds are normal. He exhibits no distension. There is no tenderness. There is no rebound and no guarding. No hernia.   Slight abdominal bulge on the right side different than the left, but no masses are noted   Musculoskeletal: Normal range of motion. He exhibits no edema.   Lymphadenopathy:     He has no cervical adenopathy.   Neurological: He is alert and oriented to person, place, and time. He has normal reflexes. No cranial nerve deficit.   Skin: Skin is warm and dry. No rash noted. No erythema.   Psychiatric: He has a normal mood and affect. His behavior is normal.   Vitals reviewed.      Assessment:       1. Type 2 diabetes mellitus with stage 3 chronic kidney disease, with long-term current use of insulin    2. Type 2 diabetes mellitus with diabetic neuropathy, without long-term current use of insulin    3. Coronary artery disease involving native coronary artery of native heart without angina pectoris    4. Hypertension associated with diabetes    5. Mixed hyperlipidemia    6. MTHFR mutation (methylenetetrahydrofolate reductase)    7. Hypercoagulable state, Prothrombin mutation    8. Prostate cancer    9. CKD (chronic kidney  disease) stage 3, GFR 30-59 ml/min, 41    10. Anxiety    11. Abdominal wall bulge    12. Diabetic polyneuropathy associated with type 2 diabetes mellitus    13. BPH with obstruction/lower urinary tract symptoms        Plan:       1.  Continue present medication as his chronic medical problems noted above are stable and controlled  2.  Continue follow-up with urology and oncology for treatment of prostate cancer  3.  Continue diabetic, low-sodium, low-fat low-cholesterol diet  4.  Patient is strongly urged again to lose weight  5.  Referral to Dr. An, colorectal surgery for evaluation of right lower quadrant abdominal pain and abdominal bulging  6.  Follow-up with me in 3 months or when necessary

## 2017-10-10 ENCOUNTER — OFFICE VISIT (OUTPATIENT)
Dept: SURGERY | Facility: CLINIC | Age: 72
End: 2017-10-10
Payer: MEDICARE

## 2017-10-10 VITALS
SYSTOLIC BLOOD PRESSURE: 177 MMHG | HEART RATE: 52 BPM | TEMPERATURE: 98 F | WEIGHT: 308 LBS | BODY MASS INDEX: 44.83 KG/M2 | DIASTOLIC BLOOD PRESSURE: 78 MMHG

## 2017-10-10 DIAGNOSIS — E66.9 OBESE BODY HABITUS: Primary | ICD-10-CM

## 2017-10-10 PROBLEM — L02.612 CELLULITIS AND ABSCESS OF TOE OF LEFT FOOT: Status: RESOLVED | Noted: 2017-02-16 | Resolved: 2017-10-10

## 2017-10-10 PROBLEM — L03.032 CELLULITIS AND ABSCESS OF TOE OF LEFT FOOT: Status: RESOLVED | Noted: 2017-02-16 | Resolved: 2017-10-10

## 2017-10-10 PROCEDURE — 99999 PR PBB SHADOW E&M-EST. PATIENT-LVL III: CPT | Mod: PBBFAC,,, | Performed by: SURGERY

## 2017-10-10 PROCEDURE — 99213 OFFICE O/P EST LOW 20 MIN: CPT | Mod: S$GLB,,, | Performed by: SURGERY

## 2017-10-10 PROCEDURE — 99499 UNLISTED E&M SERVICE: CPT | Mod: S$PBB,,, | Performed by: SURGERY

## 2017-10-10 NOTE — LETTER
October 10, 2017      Familia Ramirez Jr., MD  [address]  [facility] [address]           [facility] - General Surgery  [address]  [facility] [address]  Phone: [phone]          Patient: Richard Bustos   MR Number: [id number]   YOB: 1945   Date of Visit: 10/10/2017       Dear Dr. Familia Ramirez Jr.:    Thank you for referring Richard Bustos to me for evaluation. Attached you will find relevant portions of my assessment and plan of care.    If you have questions, please do not hesitate to call me. I look forward to following Richard Bustos along with you.    Sincerely,    Chas An MD    Enclosure  CC:  No Recipients    If you would like to receive this communication electronically, please contact [email or web address] or [phone] to request more information on [employer] access.    For providers and/or their staff who would like to refer a patient to [facility], please contact us through our one-stop-shop provider referral line, [facility], at [phone].    If you feel you have received this communication in error or would no longer like to receive these types of communications, please e-mail [email or web address]

## 2017-10-10 NOTE — PROGRESS NOTES
Subjective:       Patient ID: Richard Bustos is a 72 y.o. male.    Chief Complaint: Consult (abdominal wall bulge)    HPI  Pleasant 73 yo M referred to me for evaluation of bulge on his abdomen. Pt notes that he has noted a bulge more pronounced on his R lower abdomen as opposed to his L sided.  He notes that it is not painful.  No n/v.  NO changes in bowel habits. PT is morbidly obese and has a histoy of lap Gastric bypass.  He has lost a net of about 65 lbs since surgery.  Pt also suffers with CAD, DM, sleep apneia.  He is on both coumadin and plavix.    Review of Systems   Constitutional: Negative for activity change, appetite change, diaphoresis, fever and unexpected weight change.   Respiratory: Positive for shortness of breath. Negative for cough, chest tightness and wheezing.    Cardiovascular: Negative for chest pain and palpitations.   Gastrointestinal: Negative for abdominal distention, abdominal pain, anal bleeding, blood in stool, constipation, diarrhea, nausea, rectal pain and vomiting.   Genitourinary: Negative for difficulty urinating, dysuria and hematuria.   Musculoskeletal: Negative for back pain and gait problem.   Skin: Negative for color change and wound.   Neurological: Negative for tremors and seizures.   Hematological: Negative for adenopathy. Bruises/bleeds easily.       Objective:      Physical Exam   Constitutional: He appears well-developed and well-nourished.   HENT:   Head: Normocephalic and atraumatic.   Neck: Normal range of motion. Neck supple.   Cardiovascular: Normal rate and regular rhythm.    Pulmonary/Chest: Effort normal and breath sounds normal.   Abdominal: Soft. Bowel sounds are normal. He exhibits no distension. There is no tenderness.   Morbidly obese with especially protuberant abdomen.  More fat deposition noted on R but no masses or hernia appreciated   Vitals reviewed.      Assessment:     abnormal fat deposition  No diagnosis found.    Plan:       D/w pt.  No  indication of hernia or mass present on exam.  If he desired changes in body contouring would consider consult to plastics.

## 2017-10-13 DIAGNOSIS — I25.10 CORONARY ARTERY DISEASE INVOLVING NATIVE CORONARY ARTERY OF NATIVE HEART WITHOUT ANGINA PECTORIS: Chronic | ICD-10-CM

## 2017-10-13 RX ORDER — ALENDRONATE SODIUM AND CHOLECALCIFEROL 70; 2800 MG/1; [IU]/1
1 TABLET ORAL
Qty: 4 TABLET | Refills: 0 | Status: SHIPPED | OUTPATIENT
Start: 2017-10-13 | End: 2018-01-09 | Stop reason: SDUPTHER

## 2017-10-14 RX ORDER — FUROSEMIDE 40 MG/1
TABLET ORAL
Qty: 90 TABLET | Refills: 3 | Status: SHIPPED | OUTPATIENT
Start: 2017-10-14 | End: 2018-03-09 | Stop reason: SDUPTHER

## 2017-10-23 DIAGNOSIS — M54.5 CHRONIC LOW BACK PAIN, UNSPECIFIED BACK PAIN LATERALITY, WITH SCIATICA PRESENCE UNSPECIFIED: ICD-10-CM

## 2017-10-23 DIAGNOSIS — G89.29 CHRONIC LOW BACK PAIN, UNSPECIFIED BACK PAIN LATERALITY, WITH SCIATICA PRESENCE UNSPECIFIED: ICD-10-CM

## 2017-10-23 RX ORDER — HYDROCODONE BITARTRATE AND ACETAMINOPHEN 7.5; 325 MG/1; MG/1
1 TABLET ORAL EVERY 6 HOURS PRN
Qty: 90 TABLET | Refills: 0 | Status: SHIPPED | OUTPATIENT
Start: 2017-10-23 | End: 2017-12-07 | Stop reason: SDUPTHER

## 2017-10-23 NOTE — TELEPHONE ENCOUNTER
lov 10/5/17  lab 9/19/17  last written 9/13/17  appt 1/4/18    Per Citlali, the medication needing refill is his [place].

## 2017-10-23 NOTE — TELEPHONE ENCOUNTER
----- Message from Norma Lancaster sent at 10/23/2017  9:32 AM CDT -----  Contact: Daughter, Erik  Daughter, Citlali of patient Richard Bustos,  [phone] is calling to leave a message for the nurse.  Patient needs his pain meds called in, Daughter is unsure of the name of the medication, please call to confirm.  Please advise.  Thanks!

## 2017-11-01 ENCOUNTER — HOSPITAL ENCOUNTER (OUTPATIENT)
Dept: RADIOLOGY | Facility: CLINIC | Age: 72
Discharge: HOME | End: 2017-11-01
Attending: PODIATRIST
Payer: MEDICARE

## 2017-11-01 ENCOUNTER — OFFICE VISIT (OUTPATIENT)
Dept: PODIATRY | Facility: CLINIC | Age: 72
End: 2017-11-01
Payer: MEDICARE

## 2017-11-01 VITALS — BODY MASS INDEX: 44.06 KG/M2 | HEIGHT: 70 IN | WEIGHT: 307.75 LBS

## 2017-11-01 DIAGNOSIS — L97.519 ULCERATED, FOOT, RIGHT, WITH UNSPECIFIED SEVERITY: ICD-10-CM

## 2017-11-01 DIAGNOSIS — L97.921 LEG ULCER, LEFT, LIMITED TO BREAKDOWN OF SKIN: Primary | ICD-10-CM

## 2017-11-01 DIAGNOSIS — E11.42 DIABETIC POLYNEUROPATHY ASSOCIATED WITH TYPE 2 DIABETES MELLITUS: ICD-10-CM

## 2017-11-01 DIAGNOSIS — I73.9 PERIPHERAL VASCULAR DISEASE: ICD-10-CM

## 2017-11-01 PROCEDURE — 73630 X-RAY EXAM OF FOOT: CPT | Mod: TC,PO,RT

## 2017-11-01 PROCEDURE — 99499 UNLISTED E&M SERVICE: CPT | Mod: S$PBB,,, | Performed by: PODIATRIST

## 2017-11-01 PROCEDURE — 99999 PR PBB SHADOW E&M-EST. PATIENT-LVL III: CPT | Mod: PBBFAC,,, | Performed by: PODIATRIST

## 2017-11-01 PROCEDURE — 11042 DBRDMT SUBQ TIS 1ST 20SQCM/<: CPT | Mod: S$GLB,,, | Performed by: PODIATRIST

## 2017-11-01 PROCEDURE — 73630 X-RAY EXAM OF FOOT: CPT | Mod: 26,RT,S$GLB, | Performed by: RADIOLOGY

## 2017-11-01 PROCEDURE — 99213 OFFICE O/P EST LOW 20 MIN: CPT | Mod: 25,S$GLB,, | Performed by: PODIATRIST

## 2017-11-01 NOTE — PROGRESS NOTES
Subjective:      Patient ID: Richard Bustos is a 72 y.o. male.    Chief Complaint: Foot Ulcer (Left)    Richard is a 72 y.o. male who presents to the clinic for evaluation and treatment of high risk feet. Richard has a past medical history of Anemia; Anemia of other chronic disease (9/13/2017); Anemia, chronic renal failure (9/13/2017); Anemia, unspecified (9/13/2017); Anticoagulant long-term use; Aorta aneurysm; Arthritis; Atrial fibrillation; CAD (coronary artery disease); CHF (congestive heart failure); Chronic kidney disease; Clotting disorder (9/13/2017); Colon polyp; Diabetes mellitus; Diabetes mellitus type II; Diverticulosis; Elevated PSA; Encounter for blood transfusion; Former smoker; GERD (gastroesophageal reflux disease); Gout; colonic polyps; Hypercoagulable state; Hyperlipidemia; Hypertension; MI, old (2010); Myocardial infarction; Prostate cancer (06/2016); Sleep apnea; and Venous stasis. The patient's chief complaint is diabetic foot ulcer, right 1st mtpj and leg ulcers x 3 left.  All unknown onset, unknown course/duration about 4 days since discovery, aggravated by increased weight bearing, shoe gear, pressure.  No previous medical treatment.  OTC pain med not helping.  Denies trauma, signs infection.  . .    PCP: Familia Ramirez Jr, MD    Date Last Seen by PCP:   Chief Complaint   Patient presents with    Foot Ulcer     Left         Current shoe gear:  Affected Foot: Rx diabetic extra depth shoes and custom accommodative insoles     Unaffected Foot: Rx diabetic extra depth shoes and custom accommodative insoles    History of Trauma: negative  Sign of Infection: none    Hemoglobin A1C   Date Value Ref Range Status   09/19/2017 6.7 (H) 4.0 - 5.6 % Final     Comment:     According to ADA guidelines, hemoglobin A1c <7.0% represents  optimal control in non-pregnant diabetic patients. Different  metrics may apply to specific patient populations.   Standards of Medical Care in Diabetes-2016.  For the purpose of  screening for the presence of diabetes:  <5.7%     Consistent with the absence of diabetes  5.7-6.4%  Consistent with increasing risk for diabetes   (prediabetes)  >or=6.5%  Consistent with diabetes  Currently, no consensus exists for use of hemoglobin A1c  for diagnosis of diabetes for children.  This Hemoglobin A1c assay has significant interference with fetal   hemoglobin   (HbF). The results are invalid for patients with abnormal amounts of   HbF,   including those with known Hereditary Persistence   of Fetal Hemoglobin. Heterozygous hemoglobin variants (HbAS, HbAC,   HbAD, HbAE, HbA2) do not significantly interfere with this assay;   however, presence of multiple variants in a sample may impact the %   interference.     12/15/2014 4.7 4.5 - 6.2 % Final   06/17/2013 6.8 (H) 4.8 - 5.6 % Final     Comment:              Increased risk for diabetes: 5.7 - 6.4           Diabetes: >6.4           Glycemic control for adults with diabetes: <7.0  **In order to standardize %HbA1c results worldwide, as of October 11, 2010,  the %HbA1c is being calculated using the master equation recommended in the  consensus statement adopted by the ADA (American Diabetes Assoc), EASD  (European Assoc for the Study of Diabetes), IFCC (International Federation  of Clinical Chemistry and Laboratory Medicine) and IDF (International  Diabetes Federation). Result units: %HgbA1c (DCCT/NGSP).  In common with other methods, Hb A1C values may not accurately reflect mean  blood glucose in patients with hemoglobin variants (HgbF, HgbS and HgbC).  Any cause of shortened erythrocyte survival will reduce exposure of  erythrocytes to glucose with a consequent decrease in HbA1c (%) values, even  though the time-averaged blood glucose level may be elevated. Causes of  shortened erythrocyte lifetime might be hemolytic anemia or other hemolytic  diseases, homozygous sickle cell trait, pregnancy, recent significant or  chronic blood loss, etc. Caution should  be used when interpreting the HbA1c  results from patients with these conditions.   07/05/2012 5.7 4.0 - 6.2 % Final       Review of Systems   Constitution: Negative for chills, diaphoresis, fever, malaise/fatigue and night sweats.   Cardiovascular: Positive for leg swelling. Negative for claudication, cyanosis and syncope.   Skin: Negative for color change, dry skin, rash, suspicious lesions and unusual hair distribution.   Musculoskeletal: Negative for falls, joint pain, joint swelling, muscle cramps, muscle weakness and stiffness.   Gastrointestinal: Negative for constipation, diarrhea, nausea and vomiting.   Neurological: Positive for numbness and sensory change. Negative for brief paralysis, disturbances in coordination, focal weakness, paresthesias and tremors.           Objective:      Physical Exam   Constitutional: He is oriented to person, place, and time. He appears well-developed and well-nourished. He is cooperative.   Oriented to time, place, and person.   Cardiovascular:   Pulses:       Dorsalis pedis pulses are 1+ on the right side, and 1+ on the left side.        Posterior tibial pulses are 1+ on the right side, and 1+ on the left side.   Capillary fill time 3-5 seconds.  All toes warm to touch.      <2+ pitting lower extremity edema bilateral.    Negative elevational pallor and dependent rubor bilateral.     Musculoskeletal:   Increased angle, base, station of gait. Decreased stride length, early heel off, moderately propulsive toe off bilateral.    All ten toes without clubbing, cyanosis, or signs of ischemia.      No pain to palpation bilateral lower extremities.      Range of motion, stability, muscle strength, and muscle tone are age and health appropriate normal bilateral feet and legs.       Lymphadenopathy:   Negative lymphadenopathy bilateral popliteal fossa and tarsal tunnel.     Neurological: He is alert and oriented to person, place, and time. He has normal strength. He is not  disoriented. He displays no atrophy and no tremor. A sensory deficit is present. He exhibits normal muscle tone.   Reflex Scores:       Patellar reflexes are 2+ on the right side and 2+ on the left side.       Achilles reflexes are 2+ on the right side and 2+ on the left side.  Decreased/absent vibratory sensation bilateral feet to 128Hz tuning fork.    Diminished/loss of protective sensation all toes bilateral to 10 gram monofilament.     Skin: Skin is warm, dry and intact. No abrasion, no bruising, no burn, no ecchymosis, no laceration, no lesion, no petechiae and no rash noted. He is not diaphoretic. No cyanosis or erythema. No pallor. Nails show no clubbing.   Wound: plantar right foot    Size:    Pre:[id number]  Post: [id number]    Base:  Granular, pink with moderate serous/serosanaguinous drainage only.  No pus, tracking, fluctuance, malodor, or cardinal signs infection.    Borders:  Hyperkeratotic, debriding to flat, pink, blanchable skin edges without undermining.    Wound: left leg (two anterior one posterior)    Size:    Each about the size of a half dollar.    Base:  Granular, pink with moderate serous/serosanaguinous drainage only.  No pus, tracking, fluctuance, malodor, or cardinal signs infection.    Borders:  Atrophic, flat, pink, blanchable skin edges without undermining.    Otherwise:Skin thin, atrophic, with decreased density and distribution of pedal hair bilateral, but without hyperpigmentation, minerva discoloration,  ulcers, masses, nodules or cords palpated bilateral feet and legs.                   Assessment:       Encounter Diagnoses   Name Primary?    Leg ulcer, left, limited to breakdown of skin Yes    Ulcerated, foot, right, with unspecified severity     Peripheral vascular disease     Diabetic polyneuropathy associated with type 2 diabetes mellitus          Plan:       Richard was seen today for foot ulcer.    Diagnoses and all orders for this visit:    Leg ulcer, left, limited to breakdown of  skin    Ulcerated, foot, right, with unspecified severity  -     X-Ray Foot Complete Right; Future    Peripheral vascular disease    Diabetic polyneuropathy associated with type 2 diabetes mellitus      I counseled the patient on his conditions, their implications and medical management.        Debride foot wound.  Dressed with mepilex border.      Covered left leg wounds with kaye foam, kerlix, and applied tubigrip toes to knee left - remove compression/tubigrip nightly.  Change dressing same every other day.    Continue minimal ambulation in DM shoes/custom inserts.  Consider right 1st mtpj modification to insert.    Xray right foot.    Adequate vitamin supplementation, protein intake, and hydration - discussed with patient.            Return in about 1 week (around 11/8/2017).

## 2017-11-01 NOTE — PROCEDURES
"Wound Debridement  Date/Time: 11/1/2017 9:18 AM  Performed by: ALBINO LARSON  Authorized by: ALBINO LARSON     Time out: Immediately prior to procedure a "time out" was called to verify the correct patient, procedure, equipment, support staff and site/side marked as required.    Consent Done?:  Yes (Verbal)  Local anesthesia used?: No      Wound Details:    Location:  Right foot    Location:  Right 1st Metatarsal Head    Type of Debridement:  Excisional       Length (cm):  0.7       Area (sq cm):  0.35       Width (cm):  0.5       Percent Debrided (%):  100       Depth (cm):  0.2       Total Area Debrided (sq cm):  0.35    Depth of debridement:  Subcutaneous tissue    Tissue debrided:  Dermis, Epidermis and Subcutaneous    Devitalized tissue debrided:  Callus, Biofilm and Exudate    Instruments:  Blade    Bleeding:  Minimal  Hemostasis Achieved: Yes    Method Used:  Pressure  Patient tolerance:  Patient tolerated the procedure well with no immediate complications      "

## 2017-11-14 ENCOUNTER — OFFICE VISIT (OUTPATIENT)
Dept: PODIATRY | Facility: CLINIC | Age: 72
End: 2017-11-14
Payer: MEDICARE
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
## 2017-11-14 PROCEDURE — 99499 UNLISTED E&M SERVICE: CPT | Mod: S$GLB,,, | Performed by: PODIATRIST

## 2017-11-14 NOTE — PROGRESS NOTES
Patient was walking behind Medical Assistant on the way to the exam room, but fell and hit floor face forward.  No loss of consciousness.   He states he has bilateral hip replacements and is unable to get up unassisted.      Ambulance was called and patient requested to report to [facility].   SOS completed.

## 2017-11-20 ENCOUNTER — OFFICE VISIT (OUTPATIENT)
Dept: RADIATION ONCOLOGY | Facility: CLINIC | Age: 72
End: 2017-11-20
Payer: MEDICARE

## 2017-11-20 VITALS
HEART RATE: 63 BPM | RESPIRATION RATE: 18 BRPM | WEIGHT: 310 LBS | TEMPERATURE: 98 F | DIASTOLIC BLOOD PRESSURE: 73 MMHG | SYSTOLIC BLOOD PRESSURE: 132 MMHG | BODY MASS INDEX: 45.12 KG/M2

## 2017-11-20 DIAGNOSIS — C61 MALIGNANT NEOPLASM OF PROSTATE: Primary | ICD-10-CM

## 2017-11-20 DIAGNOSIS — C61 PROSTATE CANCER: ICD-10-CM

## 2017-11-20 PROCEDURE — 99214 OFFICE O/P EST MOD 30 MIN: CPT | Mod: ,,, | Performed by: RADIOLOGY

## 2017-11-20 RX ORDER — CALCITRIOL 0.25 UG/1
CAPSULE ORAL
COMMUNITY
Start: 2017-11-10 | End: 2018-01-22 | Stop reason: SDUPTHER

## 2017-11-20 NOTE — PROGRESS NOTES
Richard Bustos  [id number]  1945 11/20/2017  Adeline Moss Md  [address] [place] [address] [address]  [address]  [place] [address]    DIAGNOSIS:     ICD-10-CM ICD-9-CM    1. Malignant neoplasm of prostate C61 185 Prostate Specific Antigen, Diagnostic     REASON FOR VISIT: Routine scheduled follow-up.    HISTORY OF PRESENT ILLNESS:   Pt w high risk prosate ca  Genie 8 psa 7 sp xrtv 8/16 to 75.6 Gy.  Last psa in 6/16 at und[facility].  Pt on Gilberto Block Tx    INTERVAL HISTORY:   Today patient denies dysuria, hematuria, incontinence. No diarrhea or bright red blood per rectum, no bone pain. Patient recently had a fall after tripping over his feet at the foot clinic but elsewise is without complaints. He sees his urologist in January    Review of Systems   Constitutional: Negative for appetite change, chills, fever and unexpected weight change.   HENT:   Negative for hearing loss, lump/mass, mouth sores, nosebleeds, sore throat, trouble swallowing and voice change.    Eyes: Negative for eye problems and icterus.   Respiratory: Negative for chest tightness, cough, hemoptysis and shortness of breath.    Cardiovascular: Positive for leg swelling. Negative for chest pain.   Gastrointestinal: Negative for abdominal distention, abdominal pain, blood in stool, constipation, diarrhea, nausea and vomiting.   Genitourinary: Negative for bladder incontinence, difficulty urinating, dysuria, frequency, hematuria and nocturia.    Musculoskeletal: Positive for myalgias. Negative for back pain, gait problem, neck pain and neck stiffness.   Skin: Negative for itching and rash.   Neurological: Negative for extremity weakness, gait problem, headaches, numbness and seizures.   Hematological: Negative for adenopathy.   Psychiatric/Behavioral: Negative for depression. The patient is not nervous/anxious.      Past Medical History:   Diagnosis Date    Anemia     Anemia of other chronic disease 9/13/2017    Anemia, chronic renal failure  9/13/2017    Anemia, unspecified 9/13/2017    Anticoagulant long-term use     Aorta aneurysm     Arthritis     Atrial fibrillation     CAD (coronary artery disease)     CHF (congestive heart failure)     Chronic kidney disease     Clotting disorder 9/13/2017    Colon polyp     Diabetes mellitus     not on meds    Diabetes mellitus type II     Diverticulosis     Elevated PSA     Encounter for blood transfusion     Former smoker     GERD (gastroesophageal reflux disease)     Gout     Hx of colonic polyps     Hypercoagulable state     Hyperlipidemia     Hypertension     MI, old 2010    Myocardial infarction     9/2010    Prostate cancer 06/2016    Sleep apnea     Venous stasis     Chronic     Past Surgical History:   Procedure Laterality Date    ABDOMINAL SURGERY      CARDIAC SURGERY      COLONOSCOPY  02/2011    diverticulosis with diverticula with clot, no active bleeding    COLONOSCOPY N/A 8/24/2016    Procedure: COLONOSCOPY;  Surgeon: Saroj Borjas MD;  Location: [facility] END[facility];  Service: Endoscopy;  Laterality: N/A;    GASTRIC BYPASS  2/5/2008    IVC FILTER RETRIEVAL      JOINT REPLACEMENT  1996 and 2001    bi-lat hip replacement/Rt Hip and Lt Hip    ROTATOR CUFF REPAIR      Rt shoulder    Stents  8/18/2010    x 3    UPPER GASTROINTESTINAL ENDOSCOPY  02/2011     Social History     Social History    Marital status:      Spouse name: N/A    Number of children: N/A    Years of education: N/A     Social History Main Topics    Smoking status: Former Smoker    Smokeless tobacco: Never Used      Comment: 47 yrs ago, only smoked 3-4 packs in his entire life as a teenager    Alcohol use Yes      Comment: rare    Drug use: No    Sexual activity: Not Asked     Other Topics Concern    None     Social History Narrative    None     Family History   Problem Relation Age of Onset    Heart attack Mother     Heart attack Father     Heart attack Brother     Ulcerative colitis  Daughter 35    Colon cancer Neg Hx     Colon polyps Neg Hx     Crohn's disease Neg Hx      Medication List with Changes/Refills   Current Medications    ALENDRONATE-VITAMIN D3 (FOSAMAX PLUS D) 70 MG- 2,800 UNIT PER TABLET    Take 1 tablet by mouth every 7 days.    ALLOPURINOL (ZYLOPRIM) 100 MG TABLET    TAKE 1 TABLET(100 MG) BY MOUTH EVERY DAY    ALPRAZOLAM (XANAX) 1 MG TABLET    Take 1 tablet (1 mg total) by mouth every evening.    ALPRAZOLAM (XANAX) 1 MG TABLET    TAKE 1 TABLET(1 MG) BY MOUTH EVERY EVENING    ASPIRIN (ECOTRIN) 81 MG EC TABLET    Take 81 mg by mouth once daily.    ATORVASTATIN (LIPITOR) 80 MG TABLET    TAKE 1 TABLET(80 MG) BY MOUTH EVERY DAY    CALCITRIOL (ROCALTROL) 0.25 MCG CAP        CALCITRIOL (ROCALTROL) 0.5 MCG CAP        CALCIUM CARBONATE (TUMS ULTRA) 400 MG CALCIUM (1,000 MG) CHEW    Take 1 tablet (400 mg total) by mouth once daily at 6am.    CARVEDILOL (COREG) 25 MG TABLET    TAKE 1 TABLET BY MOUTH TWICE DAILY WITH MEALS    CICLOPIROX (PENLAC) 8 % SOLN    Apply topically nightly.    CICLOPIROX 0.77 % GEL    Apply topically 2 (two) times daily.    CLOPIDOGREL (PLAVIX) 75 MG TABLET    Take 1 tablet (75 mg total) by mouth once daily.    CLOTRIMAZOLE-BETAMETHASONE 1-0.05% (LOTRISONE) CREAM    Apply topically 2 (two) times daily. To head of penis    ERGOCALCIFEROL (ERGOCALCIFEROL) 50,000 UNIT CAP    TK 1 C PO Q WEEK    ESCITALOPRAM OXALATE (LEXAPRO) 10 MG TABLET    Take 1/2 tablet (5 mg) daily for first 7 days.  Thereafter take one tablet (10 mg) daily.    FERROUS SULFATE 325 MG (65 MG IRON) TAB TABLET    TAKE 1 TABLET BY MOUTH TWICE DAILY    FLUTICASONE (FLONASE) 50 MCG/ACTUATION NASAL SPRAY    2 sprays by Each Nare route once daily.    FOLIC ACID/MULTIVIT-MIN/LUTEIN (CENTRUM SILVER ORAL)    Take 1 tablet by mouth once daily.    FOSAMAX PLUS D 70 MG- 2,800 UNIT PER TABLET    TAKE 1 TABLET BY MOUTH EVERY 7 DAYS    FUROSEMIDE (LASIX) 40 MG TABLET    TAKE 1 TABLET BY MOUTH DAILY AS NEEDED     HYDROCODONE-ACETAMINOPHEN 7.5-325MG (NORCO) 7.5-325 MG PER TABLET    Take 1 tablet by mouth every 6 (six) hours as needed for Pain.    IPRATROPIUM (ATROVENT) 0.02 % NEBULIZER SOLUTION    Take 2.5 mLs (500 mcg total) by nebulization 4 (four) times daily. Rescue    L-METHYL-B6-B12 3-35-2 MG TAB    TAKE 1 TABLET BY MOUTH TWICE DAILY    LISINOPRIL (PRINIVIL,ZESTRIL) 40 MG TABLET    Take 40 mg by mouth every evening.     MAGOX 400 MG TABLET    TAKE 1 TABLET(400 MG) BY MOUTH EVERY DAY    MIRABEGRON (MYRBETRIQ) 50 MG TB24    Take 1 tablet (50 mg total) by mouth once daily.    MUPIROCIN (BACTROBAN) 2 % OINTMENT    Apply topically 2 (two) times daily.    NITROGLYCERIN 0.4 MG/DOSE TL SPRY (NITROLINGUAL) 400 MCG/SPRAY SPRAY    PLACE 1 SPRAY UNDER THE TONGUE EVERY 5 MINUTES AS NEEDED FOR CHEST PAIN    OMEPRAZOLE (PRILOSEC) 20 MG CAPSULE    Take 1 capsule (20 mg total) by mouth once daily.    RANITIDINE (ZANTAC) 150 MG TABLET    TAKE 1 TABLET(150 MG) BY MOUTH TWICE DAILY    SALICYLIC ACID (SALACYN) 6 % CREA    Apply 1 application topically 2 (two) times daily.    VIT C-VIT E-LUTEIN-MIN-OM-3 (OCUVITE) 541-51-1-150 MG-UNIT-MG-MG CAP    Take 1 capsule by mouth once daily.    WARFARIN (COUMADIN) 5 MG TABLET    TAKE 1 TABLET BY MOUTH EVERY DAY     Review of patient's allergies indicates:   Allergen Reactions    Shellfish containing products Other (See Comments)     Other reaction(s): Gout       QUALITY OF LIFE: 80%- Normal Activity with Effort: Some Symptoms of Disease    Vitals:    11/20/17 1011   BP: 132/73   Pulse: 63   Resp: 18   Temp: 97.8 °F (36.6 °C)   TempSrc: Oral   Weight: (!) 140.6 kg (310 lb)   PainSc: 0-No pain       PHYSICAL EXAM:   GENERAL: alert; in no apparent distress.   HEAD: normocephalic, atraumatic.  EYES: pupils are equal, round, reactive to light and accommodation. Sclera anicteric. Conjunctiva not injected.   NOSE/THROAT: no nasal erythema or rhinorrhea. Oropharynx pink, without erythema, ulcerations or  thrush.   NECK: no cervical motion rigidity; supple with no masses.  CHEST:  Patient is speaking comfortably on room air with normal work of breathing without using accessory muscles of respiration.  ABDOMEN: soft, nontender, nondistended. Bowel sounds present. Morbidly obese  MUSCULOSKELETAL: no tenderness to palpation along the spine or scapulae. Normal range of motion.  NEUROLOGIC: cranial nerves II-XII intact bilaterally. Strength 5/5 in bilateral upper and lower extremities. No sensory deficits appreciated.  Normal gait.  EXTREMITIES: no clubbing, cyanosis, edema.  SKIN: no erythema, rashes, ulcerations noted.     ANCILLARY DATA: 6/17 PSA < 0.01    ASSESSMENT: 72-year-old male with high-risk prostate adenocarcinoma status post definitive radiotherapy with androgen deprivation therapy, continue demonstrated good PSA response.  PLAN:  Patient is an updated PSA from June of this year as undetectable. His next PSA scheduled for January when he'll visit with his urologist Dr. Moss. As been several months would like to get a PSA today to continue trend response given his high-risk status. Elsewise by my review of systems and physical examination today he has no evidence of recurrent disease. I believe he is continuing with androgen deprivation therapy as well and he does complain of occasional hot flashes. I will call him with results of his PSA and see him back in 6 months. He had a wonderful tolerance of therapy.    All questions answered and contact information provided. Patient understands free to call us anytime with any questions or concerns regarding radiation therapy.    TIME SPENT WITH PATIENT: I have personally seen and evaluated this patient. Approximately 30 minutes were spent with the patient discussing follow-up careplan.     PHYSICIAN: Galo Lakhani Jr, MD

## 2017-11-22 LAB — PSA SERPL-MCNC: <0.1 NG/ML

## 2017-11-24 ENCOUNTER — TELEPHONE (OUTPATIENT)
Dept: PODIATRY | Facility: CLINIC | Age: 72
End: 2017-11-24

## 2017-11-24 NOTE — TELEPHONE ENCOUNTER
----- Message from Carrie Love sent at 11/24/2017  7:56 AM CST -----  Contact: [phone] (Citlali Frye)  Daughter (Citlali)requesting appointment for patient as soon as possible/stated that patient has wound on leg that looks infected and smells bad/please call back at [phone] to schedule or advise.

## 2017-11-24 NOTE — TELEPHONE ENCOUNTER
Spoke with daughter. Pt was scheduled appointment for 11/29/17 @ 11:30 and was placed on the wait list. Informed daughter if pt conditions worsens before then to go to ED.

## 2017-11-27 ENCOUNTER — OFFICE VISIT (OUTPATIENT)
Dept: PODIATRY | Facility: CLINIC | Age: 72
End: 2017-11-27
Payer: MEDICARE

## 2017-11-27 VITALS — WEIGHT: 309.94 LBS | BODY MASS INDEX: 44.37 KG/M2 | HEIGHT: 70 IN

## 2017-11-27 DIAGNOSIS — L97.519 ULCERATED, FOOT, RIGHT, WITH UNSPECIFIED SEVERITY: Primary | ICD-10-CM

## 2017-11-27 DIAGNOSIS — I73.9 PERIPHERAL VASCULAR DISEASE: ICD-10-CM

## 2017-11-27 DIAGNOSIS — E11.42 DIABETIC POLYNEUROPATHY ASSOCIATED WITH TYPE 2 DIABETES MELLITUS: ICD-10-CM

## 2017-11-27 PROCEDURE — 99999 PR PBB SHADOW E&M-EST. PATIENT-LVL III: CPT | Mod: PBBFAC,,, | Performed by: PODIATRIST

## 2017-11-27 PROCEDURE — 99499 UNLISTED E&M SERVICE: CPT | Mod: S$PBB,,, | Performed by: PODIATRIST

## 2017-11-27 PROCEDURE — 99213 OFFICE O/P EST LOW 20 MIN: CPT | Mod: 25,S$GLB,, | Performed by: PODIATRIST

## 2017-11-27 PROCEDURE — 11042 DBRDMT SUBQ TIS 1ST 20SQCM/<: CPT | Mod: S$GLB,,, | Performed by: PODIATRIST

## 2017-11-27 RX ORDER — PROMETHAZINE HYDROCHLORIDE AND DEXTROMETHORPHAN HYDROBROMIDE 6.25; 15 MG/5ML; MG/5ML
SYRUP ORAL
Refills: 0 | COMMUNITY
Start: 2017-09-19 | End: 2018-01-04

## 2017-11-27 RX ORDER — DOXYCYCLINE 100 MG/1
CAPSULE ORAL
COMMUNITY
Start: 2017-09-19 | End: 2018-02-28 | Stop reason: ALTCHOICE

## 2017-11-27 NOTE — PROGRESS NOTES
Subjective:      Patient ID: Richard Bustos is a 72 y.o. male.    Chief Complaint: Foot Ulcer (right foot)    Richard is a 72 y.o. male who presents to the clinic for evaluation and treatment of high risk feet. Richard has a past medical history of Anemia; Anemia of other chronic disease (9/13/2017); Anemia, chronic renal failure (9/13/2017); Anemia, unspecified (9/13/2017); Anticoagulant long-term use; Aorta aneurysm; Arthritis; Atrial fibrillation; CAD (coronary artery disease); CHF (congestive heart failure); Chronic kidney disease; Clotting disorder (9/13/2017); Colon polyp; Diabetes mellitus; Diabetes mellitus type II; Diverticulosis; Elevated PSA; Encounter for blood transfusion; Former smoker; GERD (gastroesophageal reflux disease); Gout; colonic polyps; Hypercoagulable state; Hyperlipidemia; Hypertension; MI, old (2010); Myocardial infarction; Prostate cancer (06/2016); Sleep apnea; and Venous stasis. The patient's chief complaint is diabetic foot ulcer, bottom right forefoot.  Gradual onset, minimal improvement over past several days- week, aggravated by increased weight bearing, shoe gear, pressure.  No previous medical treatment.  OTC pain med not helping.  Denies trauma, signs infection right foot.  . This patient has documented high risk feet requiring routine maintenance secondary to diabetes mellitis and those secondary complications of diabetes, as mentioned..    PCP: Familia Ramirez Jr, MD    Date Last Seen by PCP:   Chief Complaint   Patient presents with    Foot Ulcer     right foot         Current shoe gear:  Affected Foot: Rx diabetic extra depth shoes and custom accommodative insoles     Unaffected Foot: Rx diabetic extra depth shoes and custom accommodative insoles    History of Trauma: negative  Sign of Infection: none    Hemoglobin A1C   Date Value Ref Range Status   09/19/2017 6.7 (H) 4.0 - 5.6 % Final     Comment:     According to ADA guidelines, hemoglobin A1c <7.0% represents  optimal control  in non-pregnant diabetic patients. Different  metrics may apply to specific patient populations.   Standards of Medical Care in Diabetes-2016.  For the purpose of screening for the presence of diabetes:  <5.7%     Consistent with the absence of diabetes  5.7-6.4%  Consistent with increasing risk for diabetes   (prediabetes)  >or=6.5%  Consistent with diabetes  Currently, no consensus exists for use of hemoglobin A1c  for diagnosis of diabetes for children.  This Hemoglobin A1c assay has significant interference with fetal   hemoglobin   (HbF). The results are invalid for patients with abnormal amounts of   HbF,   including those with known Hereditary Persistence   of Fetal Hemoglobin. Heterozygous hemoglobin variants (HbAS, HbAC,   HbAD, HbAE, HbA2) do not significantly interfere with this assay;   however, presence of multiple variants in a sample may impact the %   interference.     12/15/2014 4.7 4.5 - 6.2 % Final   06/17/2013 6.8 (H) 4.8 - 5.6 % Final     Comment:              Increased risk for diabetes: 5.7 - 6.4           Diabetes: >6.4           Glycemic control for adults with diabetes: <7.0  **In order to standardize %HbA1c results worldwide, as of October 11, 2010,  the %HbA1c is being calculated using the master equation recommended in the  consensus statement adopted by the ADA (American Diabetes Assoc), EASD  (European Assoc for the Study of Diabetes), IFCC (International Federation  of Clinical Chemistry and Laboratory Medicine) and IDF (International  Diabetes Federation). Result units: %HgbA1c (DCCT/NGSP).  In common with other methods, Hb A1C values may not accurately reflect mean  blood glucose in patients with hemoglobin variants (HgbF, HgbS and HgbC).  Any cause of shortened erythrocyte survival will reduce exposure of  erythrocytes to glucose with a consequent decrease in HbA1c (%) values, even  though the time-averaged blood glucose level may be elevated. Causes of  shortened erythrocyte  lifetime might be hemolytic anemia or other hemolytic  diseases, homozygous sickle cell trait, pregnancy, recent significant or  chronic blood loss, etc. Caution should be used when interpreting the HbA1c  results from patients with these conditions.   07/05/2012 5.7 4.0 - 6.2 % Final       Review of Systems   Constitution: Negative for chills, fever, malaise/fatigue and night sweats.   Cardiovascular: Negative for claudication, cyanosis and leg swelling.   Skin: Positive for poor wound healing. Negative for color change, itching, rash and suspicious lesions.   Musculoskeletal: Negative for falls, gout, joint pain and myalgias.   Neurological: Positive for numbness and sensory change.           Objective:      Physical Exam   Constitutional: He appears well-developed and well-nourished. He is cooperative. No distress.   Cardiovascular:   Pulses:       Dorsalis pedis pulses are 1+ on the right side, and 1+ on the left side.        Posterior tibial pulses are 1+ on the right side, and 1+ on the left side.   Toes warm, pink, cap fill <5 seconds.   Musculoskeletal:   Hallux valgus and flexor toe deformities both feet without evidence of injury or loss of function.   Lymphadenopathy:   Negative lymphadenopathy bilateral popliteal fossa and tarsal tunnel.  Negative streaking both feet.   Neurological: He has normal strength. He displays no tremor. A sensory deficit is present. He exhibits normal muscle tone.   Diminished/loss of protective sensation all toes bilateral to 10 gram monofilament.     Skin: Skin is warm and dry. No abrasion, no bruising, no burn, no ecchymosis, no laceration, no lesion and no rash noted. He is not diaphoretic. No erythema. No pallor.       Wound: plantar right 1st mtpj    Size:    Pre:[id number]  Post: [id number]    Base:  Granular, pink with moderate serous/serosanaguinous drainage only.  No pus, tracking, fluctuance, malodor, or cardinal signs infection.    Borders:  Hyperkeratotic, debriding to  flat, pink, blanchable skin edges without undermining.      Otherwise, Skin thin, shiny, atrophic, with decreased density and distribution of pedal hair bilateral, but without hyperpigmentation, minerva discoloration,  ulcers, masses, nodules or cords palpated bilateral feet and legs.                 Assessment:       Encounter Diagnoses   Name Primary?    Fall, initial encounter Yes    Ulcerated, foot, right, with unspecified severity     Peripheral vascular disease     Diabetic polyneuropathy associated with type 2 diabetes mellitus          Plan:       Richard was seen today for foot ulcer.    Diagnoses and all orders for this visit:    Fall, initial encounter    Ulcerated, foot, right, with unspecified severity    Peripheral vascular disease    Diabetic polyneuropathy associated with type 2 diabetes mellitus      I counseled the patient on his conditions, their implications and medical management.        Debride wound.    Dressed right foot wound with aperture, wound foam, kerlix - change same every other day.    Dispense sx shoe right - ambulate minimally same with DM shoe/insert left.    Xray right.    Adequate vitamin supplementation, protein intake, and hydration - discussed with patient.            Return in about 1 week (around 12/4/2017).

## 2017-11-27 NOTE — PROCEDURES
"Wound Debridement  Date/Time: 11/27/2017 4:37 PM  Performed by: ALBINO LARSON.  Authorized by: ALBINO LARSON     Time out: Immediately prior to procedure a "time out" was called to verify the correct patient, procedure, equipment, support staff and site/side marked as required.    Consent Done?:  Yes (Verbal)  Local anesthesia used?: No      Wound Details:    Location:  Right foot    Location:  Right 1st Metatarsal Head    Type of Debridement:  Excisional       Length (cm):  0.9       Area (sq cm):  0.45       Width (cm):  0.5       Percent Debrided (%):  100       Depth (cm):  0.2       Total Area Debrided (sq cm):  0.45    Depth of debridement:  Subcutaneous tissue    Tissue debrided:  Dermis, Epidermis and Subcutaneous    Devitalized tissue debrided:  Biofilm, Callus and Fibrin    Instruments:  Blade    Bleeding:  Minimal  Hemostasis Achieved: Yes    Method Used:  Pressure  Patient tolerance:  Patient tolerated the procedure well with no immediate complications      "

## 2017-12-06 ENCOUNTER — OFFICE VISIT (OUTPATIENT)
Dept: PODIATRY | Facility: CLINIC | Age: 72
End: 2017-12-06
Payer: MEDICARE

## 2017-12-06 VITALS — HEIGHT: 69 IN | BODY MASS INDEX: 45.88 KG/M2 | WEIGHT: 309.75 LBS

## 2017-12-06 DIAGNOSIS — E11.42 DIABETIC POLYNEUROPATHY ASSOCIATED WITH TYPE 2 DIABETES MELLITUS: ICD-10-CM

## 2017-12-06 DIAGNOSIS — I73.9 PERIPHERAL VASCULAR DISEASE: ICD-10-CM

## 2017-12-06 DIAGNOSIS — L97.519 ULCERATED, FOOT, RIGHT, WITH UNSPECIFIED SEVERITY: Primary | ICD-10-CM

## 2017-12-06 LAB
INR PPP: 1.6
PROTHROMBIN TIME: 17 SEC (ref 9–11.5)

## 2017-12-06 PROCEDURE — 99499 UNLISTED E&M SERVICE: CPT | Mod: S$GLB,,, | Performed by: PODIATRIST

## 2017-12-06 PROCEDURE — 99999 PR PBB SHADOW E&M-EST. PATIENT-LVL III: CPT | Mod: PBBFAC,,, | Performed by: PODIATRIST

## 2017-12-06 PROCEDURE — 99499 UNLISTED E&M SERVICE: CPT | Mod: S$PBB,,, | Performed by: PODIATRIST

## 2017-12-06 PROCEDURE — 11042 DBRDMT SUBQ TIS 1ST 20SQCM/<: CPT | Mod: S$GLB,,, | Performed by: PODIATRIST

## 2017-12-06 NOTE — PROCEDURES
"Wound Debridement  Date/Time: 12/6/2017 10:33 AM  Performed by: ALBINO LARSON  Authorized by: ALBINO LARSON     Time out: Immediately prior to procedure a "time out" was called to verify the correct patient, procedure, equipment, support staff and site/side marked as required.    Consent Done?:  Yes (Verbal)    Preparation: Patient was prepped and draped in usual sterile fashion    Local anesthesia used?: No      Wound Details:    Location:  Right foot    Location:  Right 1st Metatarsal Head    Type of Debridement:  Excisional       Length (cm):  0.5       Area (sq cm):  0.2       Width (cm):  0.4       Percent Debrided (%):  100       Depth (cm):  0.2       Total Area Debrided (sq cm):  0.2    Depth of debridement:  Subcutaneous tissue    Tissue debrided:  Dermis, Epidermis and Subcutaneous    Devitalized tissue debrided:  Callus, Necrotic/Eschar and Biofilm    Instruments:  Blade    Bleeding:  Minimal  Hemostasis Achieved: Yes    Method Used:  Pressure  Patient tolerance:  Patient tolerated the procedure well with no immediate complications      "

## 2017-12-06 NOTE — PROGRESS NOTES
Subjective:      Patient ID: Richard Bustos is a 72 y.o. male.    Chief Complaint: Foot Ulcer (right side)    Richard is a 72 y.o. male who presents to the clinic for evaluation and treatment of high risk feet. Richard has a past medical history of Anemia; Anemia of other chronic disease (9/13/2017); Anemia, chronic renal failure (9/13/2017); Anemia, unspecified (9/13/2017); Anticoagulant long-term use; Aorta aneurysm; Arthritis; Atrial fibrillation; CAD (coronary artery disease); CHF (congestive heart failure); Chronic kidney disease; Clotting disorder (9/13/2017); Colon polyp; Diabetes mellitus; Diabetes mellitus type II; Diverticulosis; Elevated PSA; Encounter for blood transfusion; Former smoker; GERD (gastroesophageal reflux disease); Gout; colonic polyps; Hypercoagulable state; Hyperlipidemia; Hypertension; MI, old (2010); Myocardial infarction; Prostate cancer (06/2016); Sleep apnea; and Venous stasis. The patient's chief complaint is diabetic foot ulcer, bottom right forefoot.  Dressed in football CDI.  Ambulating minimally in sx shoe right, DM shoe insert left.  Due for new ones after first of year. This patient has documented high risk feet requiring routine maintenance secondary to diabetes mellitis and those secondary complications of diabetes, as mentioned..    Did not get xray last visit as requested.  PCP: Familia Ramirez Jr, MD    Date Last Seen by PCP:   Chief Complaint   Patient presents with    Foot Ulcer     right side         Current shoe gear:  Affected Foot: sx shoe     Unaffected Foot: Rx diabetic extra depth shoes and custom accommodative insoles    History of Trauma: negative  Sign of Infection: none    Hemoglobin A1C   Date Value Ref Range Status   09/19/2017 6.7 (H) 4.0 - 5.6 % Final     Comment:     According to ADA guidelines, hemoglobin A1c <7.0% represents  optimal control in non-pregnant diabetic patients. Different  metrics may apply to specific patient populations.   Standards of Medical  Care in Diabetes-2016.  For the purpose of screening for the presence of diabetes:  <5.7%     Consistent with the absence of diabetes  5.7-6.4%  Consistent with increasing risk for diabetes   (prediabetes)  >or=6.5%  Consistent with diabetes  Currently, no consensus exists for use of hemoglobin A1c  for diagnosis of diabetes for children.  This Hemoglobin A1c assay has significant interference with fetal   hemoglobin   (HbF). The results are invalid for patients with abnormal amounts of   HbF,   including those with known Hereditary Persistence   of Fetal Hemoglobin. Heterozygous hemoglobin variants (HbAS, HbAC,   HbAD, HbAE, HbA2) do not significantly interfere with this assay;   however, presence of multiple variants in a sample may impact the %   interference.     12/15/2014 4.7 4.5 - 6.2 % Final   06/17/2013 6.8 (H) 4.8 - 5.6 % Final     Comment:              Increased risk for diabetes: 5.7 - 6.4           Diabetes: >6.4           Glycemic control for adults with diabetes: <7.0  **In order to standardize %HbA1c results worldwide, as of October 11, 2010,  the %HbA1c is being calculated using the master equation recommended in the  consensus statement adopted by the ADA (American Diabetes Assoc), EASD  (European Assoc for the Study of Diabetes), IFCC (International Federation  of Clinical Chemistry and Laboratory Medicine) and IDF (International  Diabetes Federation). Result units: %HgbA1c (DCCT/NGSP).  In common with other methods, Hb A1C values may not accurately reflect mean  blood glucose in patients with hemoglobin variants (HgbF, HgbS and HgbC).  Any cause of shortened erythrocyte survival will reduce exposure of  erythrocytes to glucose with a consequent decrease in HbA1c (%) values, even  though the time-averaged blood glucose level may be elevated. Causes of  shortened erythrocyte lifetime might be hemolytic anemia or other hemolytic  diseases, homozygous sickle cell trait, pregnancy, recent significant  or  chronic blood loss, etc. Caution should be used when interpreting the HbA1c  results from patients with these conditions.   07/05/2012 5.7 4.0 - 6.2 % Final       Review of Systems   Constitution: Negative for chills, fever, malaise/fatigue and night sweats.   Cardiovascular: Negative for claudication, cyanosis and leg swelling.   Skin: Positive for poor wound healing. Negative for color change, itching, rash and suspicious lesions.   Musculoskeletal: Negative for falls, gout, joint pain and myalgias.   Neurological: Positive for numbness and sensory change.           Objective:      Physical Exam   Constitutional: He appears well-developed and well-nourished. He is cooperative. No distress.   Cardiovascular:   Pulses:       Dorsalis pedis pulses are 1+ on the right side, and 1+ on the left side.        Posterior tibial pulses are 1+ on the right side, and 1+ on the left side.   Toes warm, pink, cap fill <5 seconds.   Musculoskeletal:   Hallux valgus and flexor toe deformities both feet without evidence of injury or loss of function.   Lymphadenopathy:   Negative lymphadenopathy bilateral popliteal fossa and tarsal tunnel.  Negative streaking both feet.   Neurological: He has normal strength. He displays no tremor. A sensory deficit is present. He exhibits normal muscle tone.   Diminished/loss of protective sensation all toes bilateral to 10 gram monofilament.     Skin: Skin is warm and dry. No abrasion, no bruising, no burn, no ecchymosis, no laceration, no lesion and no rash noted. He is not diaphoretic. No erythema. No pallor.       Wound: plantar right 1st mtpj    Size:    Pre:[id number]  Post: [id number]    Base:  Granular, pink with moderate serous/serosanaguinous drainage only.  No pus, tracking, fluctuance, malodor, or cardinal signs infection.    Borders:  Hyperkeratotic, debriding to flat, pink, blanchable skin edges without undermining.      Otherwise, Skin thin, shiny, atrophic, with decreased density and  distribution of pedal hair bilateral, but without hyperpigmentation, minerva discoloration,  ulcers, masses, nodules or cords palpated bilateral feet and legs.                 Assessment:       Encounter Diagnoses   Name Primary?    Ulcerated, foot, right, with unspecified severity Yes    Peripheral vascular disease     Diabetic polyneuropathy associated with type 2 diabetes mellitus          Plan:       Richard was seen today for foot ulcer.    Diagnoses and all orders for this visit:    Ulcerated, foot, right, with unspecified severity    Peripheral vascular disease    Diabetic polyneuropathy associated with type 2 diabetes mellitus      I counseled the patient on his conditions, their implications and medical management.    Debride wound.    Dressed right foot wound with aperture, wound foam, kerlix - football - do not change or wet.    continue sx shoe right - ambulate minimally same with DM shoe/insert left.    Declines xray right.    Adequate vitamin supplementation, protein intake, and hydration - discussed with patient.            Return in about 1 week (around 12/13/2017).

## 2017-12-07 DIAGNOSIS — M54.5 CHRONIC LOW BACK PAIN, UNSPECIFIED BACK PAIN LATERALITY, WITH SCIATICA PRESENCE UNSPECIFIED: ICD-10-CM

## 2017-12-07 DIAGNOSIS — G89.29 CHRONIC LOW BACK PAIN, UNSPECIFIED BACK PAIN LATERALITY, WITH SCIATICA PRESENCE UNSPECIFIED: ICD-10-CM

## 2017-12-07 DIAGNOSIS — F41.9 ANXIETY: ICD-10-CM

## 2017-12-07 NOTE — TELEPHONE ENCOUNTER
----- Message from Echo Springer sent at 12/7/2017 11:40 AM CST -----  Contact: Citlali Daughter   Patient is needing a refill request on the below medication   alprazolam (XANAX) 1 MG tablet  hydrocodone-acetaminophen 7.5-325mg (NORCO) 7.5-325 mg per tablet      [id number] - [place]  [address] AT [facility] [place] [facility] [address]  [address] [address]  Phone: [phone] Fax: [phone]

## 2017-12-08 RX ORDER — HYDROCODONE BITARTRATE AND ACETAMINOPHEN 7.5; 325 MG/1; MG/1
1 TABLET ORAL EVERY 6 HOURS PRN
Qty: 90 TABLET | Refills: 0 | Status: SHIPPED | OUTPATIENT
Start: 2017-12-08 | End: 2018-01-04 | Stop reason: SDUPTHER

## 2017-12-08 RX ORDER — ALPRAZOLAM 1 MG/1
TABLET ORAL
Qty: 30 TABLET | Refills: 0 | Status: SHIPPED | OUTPATIENT
Start: 2017-12-08 | End: 2018-01-04 | Stop reason: SDUPTHER

## 2017-12-13 ENCOUNTER — OFFICE VISIT (OUTPATIENT)
Dept: PODIATRY | Facility: CLINIC | Age: 72
End: 2017-12-13
Payer: MEDICARE

## 2017-12-13 VITALS — HEIGHT: 69 IN | WEIGHT: 302 LBS | BODY MASS INDEX: 44.73 KG/M2

## 2017-12-13 DIAGNOSIS — I73.9 PERIPHERAL VASCULAR DISEASE: ICD-10-CM

## 2017-12-13 DIAGNOSIS — E11.42 DIABETIC POLYNEUROPATHY ASSOCIATED WITH TYPE 2 DIABETES MELLITUS: ICD-10-CM

## 2017-12-13 DIAGNOSIS — L97.519 ULCERATED, FOOT, RIGHT, WITH UNSPECIFIED SEVERITY: Primary | ICD-10-CM

## 2017-12-13 PROCEDURE — 99499 UNLISTED E&M SERVICE: CPT | Mod: S$PBB,,, | Performed by: PODIATRIST

## 2017-12-13 PROCEDURE — 99999 PR PBB SHADOW E&M-EST. PATIENT-LVL III: CPT | Mod: PBBFAC,,, | Performed by: PODIATRIST

## 2017-12-13 PROCEDURE — 99212 OFFICE O/P EST SF 10 MIN: CPT | Mod: S$GLB,,, | Performed by: PODIATRIST

## 2017-12-13 RX ORDER — NITROGLYCERIN 400 UG/1
SPRAY ORAL
Qty: 4.9 G | Refills: 3 | Status: SHIPPED | OUTPATIENT
Start: 2017-12-13 | End: 2019-03-12 | Stop reason: SDUPTHER

## 2017-12-13 NOTE — PROGRESS NOTES
Subjective:      Patient ID: Richard Bustos is a 72 y.o. male.    Chief Complaint: Foot Ulcer (right foot)    Richard is a 72 y.o. male who presents to the clinic for evaluation and treatment of high risk feet. Richard has a past medical history of Anemia; Anemia of other chronic disease (9/13/2017); Anemia, chronic renal failure (9/13/2017); Anemia, unspecified (9/13/2017); Anticoagulant long-term use; Aorta aneurysm; Arthritis; Atrial fibrillation; CAD (coronary artery disease); CHF (congestive heart failure); Chronic kidney disease; Clotting disorder (9/13/2017); Colon polyp; Diabetes mellitus; Diabetes mellitus type II; Diverticulosis; Elevated PSA; Encounter for blood transfusion; Former smoker; GERD (gastroesophageal reflux disease); Gout; colonic polyps; Hypercoagulable state; Hyperlipidemia; Hypertension; MI, old (2010); Myocardial infarction; Prostate cancer (06/2016); Sleep apnea; and Venous stasis. The patient's chief complaint is diabetic foot ulcer, bottom right forefoot.  Dressed in football CDI.  Ambulating minimally in sx shoe right, DM shoe insert left.  Due for new ones after first of year. This patient has documented high risk feet requiring routine maintenance secondary to diabetes mellitis and those secondary complications of diabetes, as mentioned..    Did not get xray last visit as requested.  PCP: Familia Ramirez Jr, MD    Date Last Seen by PCP:   Chief Complaint   Patient presents with    Foot Ulcer     right foot         Current shoe gear:  Affected Foot: sx shoe     Unaffected Foot: Rx diabetic extra depth shoes and custom accommodative insoles    History of Trauma: negative  Sign of Infection: none    Hemoglobin A1C   Date Value Ref Range Status   09/19/2017 6.7 (H) 4.0 - 5.6 % Final     Comment:     According to ADA guidelines, hemoglobin A1c <7.0% represents  optimal control in non-pregnant diabetic patients. Different  metrics may apply to specific patient populations.   Standards of Medical  Care in Diabetes-2016.  For the purpose of screening for the presence of diabetes:  <5.7%     Consistent with the absence of diabetes  5.7-6.4%  Consistent with increasing risk for diabetes   (prediabetes)  >or=6.5%  Consistent with diabetes  Currently, no consensus exists for use of hemoglobin A1c  for diagnosis of diabetes for children.  This Hemoglobin A1c assay has significant interference with fetal   hemoglobin   (HbF). The results are invalid for patients with abnormal amounts of   HbF,   including those with known Hereditary Persistence   of Fetal Hemoglobin. Heterozygous hemoglobin variants (HbAS, HbAC,   HbAD, HbAE, HbA2) do not significantly interfere with this assay;   however, presence of multiple variants in a sample may impact the %   interference.     12/15/2014 4.7 4.5 - 6.2 % Final   06/17/2013 6.8 (H) 4.8 - 5.6 % Final     Comment:              Increased risk for diabetes: 5.7 - 6.4           Diabetes: >6.4           Glycemic control for adults with diabetes: <7.0  **In order to standardize %HbA1c results worldwide, as of October 11, 2010,  the %HbA1c is being calculated using the master equation recommended in the  consensus statement adopted by the ADA (American Diabetes Assoc), EASD  (European Assoc for the Study of Diabetes), IFCC (International Federation  of Clinical Chemistry and Laboratory Medicine) and IDF (International  Diabetes Federation). Result units: %HgbA1c (DCCT/NGSP).  In common with other methods, Hb A1C values may not accurately reflect mean  blood glucose in patients with hemoglobin variants (HgbF, HgbS and HgbC).  Any cause of shortened erythrocyte survival will reduce exposure of  erythrocytes to glucose with a consequent decrease in HbA1c (%) values, even  though the time-averaged blood glucose level may be elevated. Causes of  shortened erythrocyte lifetime might be hemolytic anemia or other hemolytic  diseases, homozygous sickle cell trait, pregnancy, recent significant  or  chronic blood loss, etc. Caution should be used when interpreting the HbA1c  results from patients with these conditions.   07/05/2012 5.7 4.0 - 6.2 % Final       Review of Systems   Constitution: Negative for chills, fever, malaise/fatigue and night sweats.   Cardiovascular: Negative for claudication, cyanosis and leg swelling.   Skin: Positive for poor wound healing. Negative for color change, itching, rash and suspicious lesions.   Musculoskeletal: Negative for falls, gout, joint pain and myalgias.   Neurological: Positive for numbness and sensory change.           Objective:      Physical Exam   Constitutional: He appears well-developed and well-nourished. He is cooperative. No distress.   Cardiovascular:   Pulses:       Dorsalis pedis pulses are 1+ on the right side, and 1+ on the left side.        Posterior tibial pulses are 1+ on the right side, and 1+ on the left side.   Toes warm, pink, cap fill <5 seconds.   Musculoskeletal:   Hallux valgus and flexor toe deformities both feet without evidence of injury or loss of function.   Lymphadenopathy:   Negative lymphadenopathy bilateral popliteal fossa and tarsal tunnel.  Negative streaking both feet.   Neurological: He has normal strength. He displays no tremor. A sensory deficit is present. He exhibits normal muscle tone.   Diminished/loss of protective sensation all toes bilateral to 10 gram monofilament.     Skin: Skin is warm and dry. No abrasion, no bruising, no burn, no ecchymosis, no laceration, no lesion and no rash noted. He is not diaphoretic. No erythema. No pallor.       Wound: plantar right 1st mtpj    Size:    Pre:[id number]      Base:  Granular, pink with moderate serous/serosanaguinous drainage only.  No pus, tracking, fluctuance, malodor, or cardinal signs infection.    Borders:  Hyperkeratotic, debriding to flat, pink, blanchable skin edges without undermining.      Otherwise, Skin thin, shiny, atrophic, with decreased density and distribution  of pedal hair bilateral, but without hyperpigmentation, minerva discoloration,  ulcers, masses, nodules or cords palpated bilateral feet and legs.                 Assessment:       Encounter Diagnoses   Name Primary?    Ulcerated, foot, right, with unspecified severity Yes    Peripheral vascular disease     Diabetic polyneuropathy associated with type 2 diabetes mellitus          Plan:       Richard was seen today for foot ulcer.    Diagnoses and all orders for this visit:    Ulcerated, foot, right, with unspecified severity    Peripheral vascular disease    Diabetic polyneuropathy associated with type 2 diabetes mellitus      I counseled the patient on his conditions, their implications and medical management.    Dressed right foot wound with aperture, wound foam, kerlix - football - do not change or wet.    continue sx shoe right - ambulate minimally same with DM shoe/insert left.    Adequate vitamin supplementation, protein intake, and hydration - discussed with patient.            Return in about 1 week (around 12/20/2017).

## 2017-12-20 ENCOUNTER — OFFICE VISIT (OUTPATIENT)
Dept: PODIATRY | Facility: CLINIC | Age: 72
End: 2017-12-20
Payer: MEDICARE

## 2017-12-20 VITALS — BODY MASS INDEX: 44.73 KG/M2 | HEIGHT: 69 IN | WEIGHT: 302 LBS

## 2017-12-20 DIAGNOSIS — E11.42 DIABETIC POLYNEUROPATHY ASSOCIATED WITH TYPE 2 DIABETES MELLITUS: ICD-10-CM

## 2017-12-20 DIAGNOSIS — L97.519 ULCERATED, FOOT, RIGHT, WITH UNSPECIFIED SEVERITY: Primary | ICD-10-CM

## 2017-12-20 DIAGNOSIS — I73.9 PERIPHERAL VASCULAR DISEASE: ICD-10-CM

## 2017-12-20 PROCEDURE — 99212 OFFICE O/P EST SF 10 MIN: CPT | Mod: S$GLB,,, | Performed by: PODIATRIST

## 2017-12-20 PROCEDURE — 99999 PR PBB SHADOW E&M-EST. PATIENT-LVL III: CPT | Mod: PBBFAC,,, | Performed by: PODIATRIST

## 2017-12-20 PROCEDURE — 99499 UNLISTED E&M SERVICE: CPT | Mod: S$PBB,,, | Performed by: PODIATRIST

## 2017-12-20 NOTE — PROGRESS NOTES
Subjective:      Patient ID: iRchard Bustos is a 72 y.o. male.    Chief Complaint: Foot Ulcer (right foot)    Richard is a 72 y.o. male who presents to the clinic for evaluation and treatment of high risk feet. Richard has a past medical history of Anemia; Anemia of other chronic disease (9/13/2017); Anemia, chronic renal failure (9/13/2017); Anemia, unspecified (9/13/2017); Anticoagulant long-term use; Aorta aneurysm; Arthritis; Atrial fibrillation; CAD (coronary artery disease); CHF (congestive heart failure); Chronic kidney disease; Clotting disorder (9/13/2017); Colon polyp; Diabetes mellitus; Diabetes mellitus type II; Diverticulosis; Elevated PSA; Encounter for blood transfusion; Former smoker; GERD (gastroesophageal reflux disease); Gout; colonic polyps; Hypercoagulable state; Hyperlipidemia; Hypertension; MI, old (2010); Myocardial infarction; Prostate cancer (06/2016); Sleep apnea; and Venous stasis. The patient's chief complaint is diabetic foot ulcer, bottom right forefoot.  Dressed in football CDI.  Ambulating minimally in sx shoe right, DM shoe insert left.  Due for new ones after first of year. This patient has documented high risk feet requiring routine maintenance secondary to diabetes mellitis and those secondary complications of diabetes, as mentioned..  Dressing CDI changed at home following getting wet in the shower.  Denies signs infection today.  Did not get xray last visit as requested.  PCP: Familia Ramirez Jr, MD    Date Last Seen by PCP:   Chief Complaint   Patient presents with    Foot Ulcer     right foot         Current shoe gear:  Affected Foot: sx shoe     Unaffected Foot: Rx diabetic extra depth shoes and custom accommodative insoles    History of Trauma: negative  Sign of Infection: none    Hemoglobin A1C   Date Value Ref Range Status   09/19/2017 6.7 (H) 4.0 - 5.6 % Final     Comment:     According to ADA guidelines, hemoglobin A1c <7.0% represents  optimal control in non-pregnant  diabetic patients. Different  metrics may apply to specific patient populations.   Standards of Medical Care in Diabetes-2016.  For the purpose of screening for the presence of diabetes:  <5.7%     Consistent with the absence of diabetes  5.7-6.4%  Consistent with increasing risk for diabetes   (prediabetes)  >or=6.5%  Consistent with diabetes  Currently, no consensus exists for use of hemoglobin A1c  for diagnosis of diabetes for children.  This Hemoglobin A1c assay has significant interference with fetal   hemoglobin   (HbF). The results are invalid for patients with abnormal amounts of   HbF,   including those with known Hereditary Persistence   of Fetal Hemoglobin. Heterozygous hemoglobin variants (HbAS, HbAC,   HbAD, HbAE, HbA2) do not significantly interfere with this assay;   however, presence of multiple variants in a sample may impact the %   interference.     12/15/2014 4.7 4.5 - 6.2 % Final   06/17/2013 6.8 (H) 4.8 - 5.6 % Final     Comment:              Increased risk for diabetes: 5.7 - 6.4           Diabetes: >6.4           Glycemic control for adults with diabetes: <7.0  **In order to standardize %HbA1c results worldwide, as of October 11, 2010,  the %HbA1c is being calculated using the master equation recommended in the  consensus statement adopted by the ADA (American Diabetes Assoc), EASD  (European Assoc for the Study of Diabetes), IFCC (International Federation  of Clinical Chemistry and Laboratory Medicine) and IDF (International  Diabetes Federation). Result units: %HgbA1c (DCCT/NGSP).  In common with other methods, Hb A1C values may not accurately reflect mean  blood glucose in patients with hemoglobin variants (HgbF, HgbS and HgbC).  Any cause of shortened erythrocyte survival will reduce exposure of  erythrocytes to glucose with a consequent decrease in HbA1c (%) values, even  though the time-averaged blood glucose level may be elevated. Causes of  shortened erythrocyte lifetime might be  hemolytic anemia or other hemolytic  diseases, homozygous sickle cell trait, pregnancy, recent significant or  chronic blood loss, etc. Caution should be used when interpreting the HbA1c  results from patients with these conditions.   07/05/2012 5.7 4.0 - 6.2 % Final       Review of Systems   Constitution: Negative for chills, fever, malaise/fatigue and night sweats.   Cardiovascular: Negative for claudication, cyanosis and leg swelling.   Skin: Positive for poor wound healing. Negative for color change, itching, rash and suspicious lesions.   Musculoskeletal: Negative for falls, gout, joint pain and myalgias.   Neurological: Positive for numbness and sensory change.           Objective:      Physical Exam   Constitutional: He appears well-developed and well-nourished. He is cooperative. No distress.   Cardiovascular:   Pulses:       Dorsalis pedis pulses are 1+ on the right side, and 1+ on the left side.        Posterior tibial pulses are 1+ on the right side, and 1+ on the left side.   Toes warm, pink, cap fill <5 seconds.   Musculoskeletal:   Hallux valgus and flexor toe deformities both feet without evidence of injury or loss of function.   Lymphadenopathy:   Negative lymphadenopathy bilateral popliteal fossa and tarsal tunnel.  Negative streaking both feet.   Neurological: He has normal strength. He displays no tremor. A sensory deficit is present. He exhibits normal muscle tone.   Diminished/loss of protective sensation all toes bilateral to 10 gram monofilament.     Skin: Skin is warm and dry. No abrasion, no bruising, no burn, no ecchymosis, no laceration, no lesion and no rash noted. He is not diaphoretic. No erythema. No pallor.       Wound: plantar right 1st mtpj    Size:    Pre:[id number]      Base:  Granular, pink with moderate serous/serosanaguinous drainage only.  No pus, tracking, fluctuance, malodor, or cardinal signs infection.    Borders:  Hyperkeratotic, debriding to flat, pink, blanchable skin  edges without undermining.      Otherwise, Skin thin, shiny, atrophic, with decreased density and distribution of pedal hair bilateral, but without hyperpigmentation, minerva discoloration,  ulcers, masses, nodules or cords palpated bilateral feet and legs.                 Assessment:       No diagnosis found.      Plan:       There are no diagnoses linked to this encounter.     I counseled the patient on his conditions, their implications and medical management.    Dressed right foot wound with aperture, wound foam, kerlix - football - do not change or wet.    continue sx shoe right - ambulate minimally same with DM shoe/insert left.    Adequate vitamin supplementation, protein intake, and hydration - discussed with patient.            No Follow-up on file.

## 2017-12-26 RX ORDER — OMEPRAZOLE 20 MG/1
CAPSULE, DELAYED RELEASE ORAL
Qty: 30 CAPSULE | Refills: 0 | Status: SHIPPED | OUTPATIENT
Start: 2017-12-26 | End: 2017-12-26 | Stop reason: SDUPTHER

## 2017-12-27 ENCOUNTER — ANTI-COAG VISIT (OUTPATIENT)
Dept: CARDIOLOGY | Facility: CLINIC | Age: 72
End: 2017-12-27

## 2017-12-27 DIAGNOSIS — D68.59 HYPERCOAGULABLE STATE: ICD-10-CM

## 2017-12-27 LAB
INR PPP: 1.6
PROTHROMBIN TIME: 16.3 SEC (ref 9–11.5)

## 2017-12-27 RX ORDER — OMEPRAZOLE 20 MG/1
CAPSULE, DELAYED RELEASE ORAL
Qty: 90 CAPSULE | Refills: 3 | Status: SHIPPED | OUTPATIENT
Start: 2017-12-27 | End: 2019-01-10 | Stop reason: SDUPTHER

## 2017-12-28 ENCOUNTER — OFFICE VISIT (OUTPATIENT)
Dept: PODIATRY | Facility: CLINIC | Age: 72
End: 2017-12-28
Payer: MEDICARE

## 2017-12-28 DIAGNOSIS — E11.42 DIABETIC POLYNEUROPATHY ASSOCIATED WITH TYPE 2 DIABETES MELLITUS: ICD-10-CM

## 2017-12-28 DIAGNOSIS — I73.9 PERIPHERAL VASCULAR DISEASE: ICD-10-CM

## 2017-12-28 DIAGNOSIS — L97.519 ULCERATED, FOOT, RIGHT, WITH UNSPECIFIED SEVERITY: Primary | ICD-10-CM

## 2017-12-28 PROCEDURE — 99212 OFFICE O/P EST SF 10 MIN: CPT | Mod: S$GLB,,, | Performed by: PODIATRIST

## 2017-12-28 PROCEDURE — 99499 UNLISTED E&M SERVICE: CPT | Mod: S$PBB,,, | Performed by: PODIATRIST

## 2017-12-28 PROCEDURE — 99999 PR PBB SHADOW E&M-EST. PATIENT-LVL II: CPT | Mod: PBBFAC,,, | Performed by: PODIATRIST

## 2017-12-28 NOTE — PROGRESS NOTES
Subjective:      Patient ID: Richard Bustos is a 72 y.o. male.    Chief Complaint: No chief complaint on file.    Richard is a 72 y.o. male who presents to the clinic for evaluation and treatment of high risk feet. Richard has a past medical history of Anemia; Anemia of other chronic disease (9/13/2017); Anemia, chronic renal failure (9/13/2017); Anemia, unspecified (9/13/2017); Anticoagulant long-term use; Aorta aneurysm; Arthritis; Atrial fibrillation; CAD (coronary artery disease); CHF (congestive heart failure); Chronic kidney disease; Clotting disorder (9/13/2017); Colon polyp; Diabetes mellitus; Diabetes mellitus type II; Diverticulosis; Elevated PSA; Encounter for blood transfusion; Former smoker; GERD (gastroesophageal reflux disease); Gout; colonic polyps; Hypercoagulable state; Hyperlipidemia; Hypertension; MI, old (2010); Myocardial infarction; Prostate cancer (06/2016); Sleep apnea; and Venous stasis. The patient's chief complaint is diabetic foot ulcer, bottom right forefoot.  Dressed in football CDI.  Ambulating minimally in sx shoe right, DM shoe insert left.  Due for new ones after first of year. This patient has documented high risk feet requiring routine maintenance secondary to diabetes mellitis and those secondary complications of diabetes, as mentioned..  Dressing CDI changed at home following getting wet in the shower.  Denies signs infection today.  Did not get xray last visit as requested.      PCP: Familia Ramirez Jr, MD    Date Last Seen by PCP:   No chief complaint on file.        Current shoe gear:  Affected Foot: sx shoe     Unaffected Foot: Rx diabetic extra depth shoes and custom accommodative insoles    History of Trauma: negative  Sign of Infection: none    Hemoglobin A1C   Date Value Ref Range Status   09/19/2017 6.7 (H) 4.0 - 5.6 % Final     Comment:     According to ADA guidelines, hemoglobin A1c <7.0% represents  optimal control in non-pregnant diabetic patients. Different  metrics may  apply to specific patient populations.   Standards of Medical Care in Diabetes-2016.  For the purpose of screening for the presence of diabetes:  <5.7%     Consistent with the absence of diabetes  5.7-6.4%  Consistent with increasing risk for diabetes   (prediabetes)  >or=6.5%  Consistent with diabetes  Currently, no consensus exists for use of hemoglobin A1c  for diagnosis of diabetes for children.  This Hemoglobin A1c assay has significant interference with fetal   hemoglobin   (HbF). The results are invalid for patients with abnormal amounts of   HbF,   including those with known Hereditary Persistence   of Fetal Hemoglobin. Heterozygous hemoglobin variants (HbAS, HbAC,   HbAD, HbAE, HbA2) do not significantly interfere with this assay;   however, presence of multiple variants in a sample may impact the %   interference.     12/15/2014 4.7 4.5 - 6.2 % Final   06/17/2013 6.8 (H) 4.8 - 5.6 % Final     Comment:              Increased risk for diabetes: 5.7 - 6.4           Diabetes: >6.4           Glycemic control for adults with diabetes: <7.0  **In order to standardize %HbA1c results worldwide, as of October 11, 2010,  the %HbA1c is being calculated using the master equation recommended in the  consensus statement adopted by the ADA (American Diabetes Assoc), EASD  (European Assoc for the Study of Diabetes), IFCC (International Federation  of Clinical Chemistry and Laboratory Medicine) and IDF (International  Diabetes Federation). Result units: %HgbA1c (DCCT/NGSP).  In common with other methods, Hb A1C values may not accurately reflect mean  blood glucose in patients with hemoglobin variants (HgbF, HgbS and HgbC).  Any cause of shortened erythrocyte survival will reduce exposure of  erythrocytes to glucose with a consequent decrease in HbA1c (%) values, even  though the time-averaged blood glucose level may be elevated. Causes of  shortened erythrocyte lifetime might be hemolytic anemia or other  hemolytic  diseases, homozygous sickle cell trait, pregnancy, recent significant or  chronic blood loss, etc. Caution should be used when interpreting the HbA1c  results from patients with these conditions.   07/05/2012 5.7 4.0 - 6.2 % Final       Review of Systems   Constitution: Negative for chills, fever, malaise/fatigue and night sweats.   Cardiovascular: Negative for claudication, cyanosis and leg swelling.   Skin: Positive for poor wound healing. Negative for color change, itching, rash and suspicious lesions.   Musculoskeletal: Negative for falls, gout, joint pain and myalgias.   Neurological: Positive for numbness and sensory change.           Objective:      Physical Exam   Constitutional: He appears well-developed and well-nourished. He is cooperative. No distress.   Cardiovascular:   Pulses:       Dorsalis pedis pulses are 1+ on the right side, and 1+ on the left side.        Posterior tibial pulses are 1+ on the right side, and 1+ on the left side.   Toes warm, pink, cap fill <5 seconds.   Musculoskeletal:   Hallux valgus and flexor toe deformities both feet without evidence of injury or loss of function.   Lymphadenopathy:   Negative lymphadenopathy bilateral popliteal fossa and tarsal tunnel.  Negative streaking both feet.   Neurological: He has normal strength. He displays no tremor. A sensory deficit is present. He exhibits normal muscle tone.   Diminished/loss of protective sensation all toes bilateral to 10 gram monofilament.     Skin: Skin is warm and dry. No abrasion, no bruising, no burn, no ecchymosis, no laceration, no lesion and no rash noted. He is not diaphoretic. No erythema. No pallor.   Wound plantar 1st mtpj right closed today, epithelialized  without ulceration, drainage, pus, tracking, fluctuance, malodor, or cardinal signs infection.      Otherwise, Skin thin, shiny, atrophic, with decreased density and distribution of pedal hair bilateral, but without hyperpigmentation, minerva  discoloration,  ulcers, masses, nodules or cords palpated bilateral feet and legs.                 Assessment:       Encounter Diagnoses   Name Primary?    Ulcerated, foot, right, with unspecified severity Yes    Peripheral vascular disease     Diabetic polyneuropathy associated with type 2 diabetes mellitus          Plan:       Diagnoses and all orders for this visit:    Ulcerated, foot, right, with unspecified severity    Peripheral vascular disease    Diabetic polyneuropathy associated with type 2 diabetes mellitus         I counseled the patient on his conditions, their implications and medical management.    Dressed right foot wound with aperture, wound foam, kerlix - football - do not change or wet.    continue sx shoe right - ambulate minimally same with DM shoe/insert left.    Adequate vitamin supplementation, protein intake, and hydration - discussed with patient.            Return in about 1 week (around 1/4/2018).

## 2018-01-04 ENCOUNTER — OFFICE VISIT (OUTPATIENT)
Dept: FAMILY MEDICINE | Facility: CLINIC | Age: 73
End: 2018-01-04
Payer: MEDICARE

## 2018-01-04 VITALS
BODY MASS INDEX: 45.26 KG/M2 | TEMPERATURE: 98 F | WEIGHT: 305.56 LBS | HEART RATE: 61 BPM | SYSTOLIC BLOOD PRESSURE: 117 MMHG | DIASTOLIC BLOOD PRESSURE: 68 MMHG | HEIGHT: 69 IN

## 2018-01-04 DIAGNOSIS — E11.21 TYPE 2 DIABETES MELLITUS WITH DIABETIC NEPHROPATHY, WITHOUT LONG-TERM CURRENT USE OF INSULIN: Primary | ICD-10-CM

## 2018-01-04 DIAGNOSIS — F41.1 GAD (GENERALIZED ANXIETY DISORDER): ICD-10-CM

## 2018-01-04 DIAGNOSIS — F11.90 CHRONIC, CONTINUOUS USE OF OPIOIDS: ICD-10-CM

## 2018-01-04 DIAGNOSIS — L57.0 AK (ACTINIC KERATOSIS): ICD-10-CM

## 2018-01-04 DIAGNOSIS — I48.0 PAROXYSMAL ATRIAL FIBRILLATION: ICD-10-CM

## 2018-01-04 DIAGNOSIS — G89.29 CHRONIC LOW BACK PAIN, UNSPECIFIED BACK PAIN LATERALITY, WITH SCIATICA PRESENCE UNSPECIFIED: ICD-10-CM

## 2018-01-04 DIAGNOSIS — E11.42 TYPE 2 DIABETES MELLITUS WITH DIABETIC POLYNEUROPATHY, WITHOUT LONG-TERM CURRENT USE OF INSULIN: ICD-10-CM

## 2018-01-04 DIAGNOSIS — N18.30 CKD (CHRONIC KIDNEY DISEASE) STAGE 3, GFR 30-59 ML/MIN: ICD-10-CM

## 2018-01-04 DIAGNOSIS — M54.5 CHRONIC LOW BACK PAIN, UNSPECIFIED BACK PAIN LATERALITY, WITH SCIATICA PRESENCE UNSPECIFIED: ICD-10-CM

## 2018-01-04 DIAGNOSIS — J06.9 URI, ACUTE: ICD-10-CM

## 2018-01-04 DIAGNOSIS — F41.9 ANXIETY: ICD-10-CM

## 2018-01-04 DIAGNOSIS — I15.2 HYPERTENSION ASSOCIATED WITH DIABETES: ICD-10-CM

## 2018-01-04 DIAGNOSIS — E66.01 MORBID OBESITY WITH BODY MASS INDEX (BMI) OF 45.0 TO 49.9 IN ADULT: ICD-10-CM

## 2018-01-04 DIAGNOSIS — E11.59 HYPERTENSION ASSOCIATED WITH DIABETES: ICD-10-CM

## 2018-01-04 LAB
AMPHET+METHAMPHET UR QL: NEGATIVE
BARBITURATES UR QL SCN>200 NG/ML: NEGATIVE
BENZODIAZ UR QL SCN>200 NG/ML: NEGATIVE
BZE UR QL SCN: NEGATIVE
CANNABINOIDS UR QL SCN: NEGATIVE
CREAT UR-MCNC: 97 MG/DL
ETHANOL UR-MCNC: <10 MG/DL
METHADONE UR QL SCN>300 NG/ML: NEGATIVE
OPIATES UR QL SCN: NEGATIVE
PCP UR QL SCN>25 NG/ML: NEGATIVE
TOXICOLOGY INFORMATION: NORMAL

## 2018-01-04 PROCEDURE — 99214 OFFICE O/P EST MOD 30 MIN: CPT | Mod: S$GLB,,, | Performed by: PHYSICIAN ASSISTANT

## 2018-01-04 PROCEDURE — 99999 PR PBB SHADOW E&M-EST. PATIENT-LVL V: CPT | Mod: PBBFAC,,, | Performed by: PHYSICIAN ASSISTANT

## 2018-01-04 PROCEDURE — 80307 DRUG TEST PRSMV CHEM ANLYZR: CPT

## 2018-01-04 RX ORDER — BENZONATATE 100 MG/1
100 CAPSULE ORAL 3 TIMES DAILY PRN
Qty: 30 CAPSULE | Refills: 0 | Status: SHIPPED | OUTPATIENT
Start: 2018-01-04 | End: 2018-01-14

## 2018-01-04 NOTE — TELEPHONE ENCOUNTER
Patient seen today for routine 3 month visit  Due for refills hydrocodone and xanax   reviewed   UDS collected and new pain contract signed for you

## 2018-01-04 NOTE — PROGRESS NOTES
Subjective:       Patient ID: Richard Bustos is a 72 y.o. male.    Chief Complaint: Follow-up (DM)    Patient with type 2 diabetes mellitus with co morbidities of neuropathy and nephropathy, atrial fibrillation on coumadin, hypertension, chronic back pain and anxiety presents for follow up.  He is due for refills of controlled substances.  He used hydrocodone 7.5 mg tid for chronic back pain.  He has seen pain management hand had [facility] and [facility].  He has seen Dr Felipe of the spine clinic.  Regarding anxiety he uses lexapro daily and xanax qhs prn insomnia.  Regarding his diabetes He did not bring glucose log or BP log.  BP is at goal today and previous A1C was at goal.  He is complaining of a new skin lesion to the right brow.  The lesion has been present for about 3 weeks.  He has used ointment without relief.  He is having nasal congestion and cough for several days.  He denies fever.        Review of Systems   Constitutional: Negative for activity change, appetite change, chills, diaphoresis, fatigue and fever.   HENT: Positive for congestion and rhinorrhea. Negative for ear pain, sinus pain, sinus pressure and sore throat.    Respiratory: Positive for cough. Negative for chest tightness and wheezing.    Cardiovascular: Negative for chest pain, palpitations and leg swelling.   Gastrointestinal: Negative for abdominal pain, diarrhea, nausea and vomiting.   Genitourinary: Negative for difficulty urinating.   Musculoskeletal: Negative for arthralgias.   Skin: Positive for wound. Negative for rash.   Neurological: Negative for dizziness, light-headedness and headaches.       Objective:      Physical Exam   Constitutional: He appears well-developed and well-nourished. He is cooperative. No distress.   HENT:   Head: Normocephalic and atraumatic.   Eyes: Conjunctivae and EOM are normal. No scleral icterus.   Neck: Carotid bruit is not present.   Cardiovascular: Normal rate, regular rhythm and normal heart sounds.     Pulmonary/Chest: Effort normal and breath sounds normal.   Abdominal: Soft. Bowel sounds are normal. There is no tenderness.   Musculoskeletal:        Right lower leg: He exhibits no edema.        Left lower leg: He exhibits no edema.   Neurological: He is alert.   Skin: Skin is warm and dry.   Crusted fleshed toned papule on the right brow    Vitals reviewed.      Assessment:       1. Type 2 diabetes mellitus with diabetic nephropathy, without long-term current use of insulin    2. Type 2 diabetes mellitus with diabetic polyneuropathy, without long-term current use of insulin    3. Hypertension associated with diabetes    4. CKD (chronic kidney disease) stage 3, GFR 30-59 ml/min, 41    5. Morbid obesity with body mass index (BMI) of 45.0 to 49.9 in adult    6. Paroxysmal atrial fibrillation, new onset, 11/2014    7. AK (actinic keratosis)    8. Chronic, continuous use of opioids    9. GARY (generalized anxiety disorder)    10. URI, acute        Plan:       Richard was seen today for follow-up.    Diagnoses and all orders for this visit:    Type 2 diabetes mellitus with diabetic nephropathy, without long-term current use of insulin  -     Hemoglobin A1c; Future    Type 2 diabetes mellitus with diabetic polyneuropathy, without long-term current use of insulin    Hypertension associated with diabetes, at goal  Continue as is   CKD (chronic kidney disease) stage 3, GFR 30-59 ml/min, 41    Morbid obesity with body mass index (BMI) of 45.0 to 49.9 in adult    Paroxysmal atrial fibrillation, new onset, 11/2014  Follow up cardiology as scheduled   AK (actinic keratosis)  -     Ambulatory referral to Dermatology    Chronic, continuous use of opioids  -     Toxicology screen, urine  Refill request to PCP     reviewed   GARY (generalized anxiety disorder)  Refill request to PCP    reviewed

## 2018-01-05 RX ORDER — ALPRAZOLAM 1 MG/1
TABLET ORAL
Qty: 30 TABLET | Refills: 0 | Status: SHIPPED | OUTPATIENT
Start: 2018-01-05 | End: 2018-02-15 | Stop reason: SDUPTHER

## 2018-01-05 RX ORDER — HYDROCODONE BITARTRATE AND ACETAMINOPHEN 7.5; 325 MG/1; MG/1
1 TABLET ORAL EVERY 6 HOURS PRN
Qty: 90 TABLET | Refills: 0 | Status: SHIPPED | OUTPATIENT
Start: 2018-01-05 | End: 2018-02-15 | Stop reason: SDUPTHER

## 2018-01-09 ENCOUNTER — OFFICE VISIT (OUTPATIENT)
Dept: PODIATRY | Facility: CLINIC | Age: 73
End: 2018-01-09
Payer: MEDICARE

## 2018-01-09 VITALS — BODY MASS INDEX: 45.26 KG/M2 | HEIGHT: 69 IN | WEIGHT: 305.56 LBS

## 2018-01-09 DIAGNOSIS — Z87.2 HEALED ULCER OF RIGHT FOOT ON EXAMINATION: Primary | ICD-10-CM

## 2018-01-09 DIAGNOSIS — E11.42 DIABETIC POLYNEUROPATHY ASSOCIATED WITH TYPE 2 DIABETES MELLITUS: ICD-10-CM

## 2018-01-09 DIAGNOSIS — I73.9 PERIPHERAL VASCULAR DISEASE: ICD-10-CM

## 2018-01-09 DIAGNOSIS — L84 CORN OR CALLUS: ICD-10-CM

## 2018-01-09 DIAGNOSIS — M20.42 HAMMER TOES OF BOTH FEET: ICD-10-CM

## 2018-01-09 DIAGNOSIS — M20.41 HAMMER TOES OF BOTH FEET: ICD-10-CM

## 2018-01-09 PROCEDURE — 99212 OFFICE O/P EST SF 10 MIN: CPT | Mod: S$GLB,,, | Performed by: PODIATRIST

## 2018-01-09 PROCEDURE — 99999 PR PBB SHADOW E&M-EST. PATIENT-LVL II: CPT | Mod: PBBFAC,,, | Performed by: PODIATRIST

## 2018-01-09 NOTE — PROGRESS NOTES
Subjective:      Patient ID: Richard Bustos is a 72 y.o. male.    Chief Complaint: Foot Ulcer (right) and Diabetes Mellitus (PCP:  Dr Ramirez ([facility]) 1/4/18; HgbA1c: 9/19/17  6.7)    Richard is a 72 y.o. male who presents to the clinic for evaluation and treatment of high risk feet. Richard has a past medical history of Anemia; Anemia of other chronic disease (9/13/2017); Anemia, chronic renal failure (9/13/2017); Anemia, unspecified (9/13/2017); Anticoagulant long-term use; Aorta aneurysm; Arthritis; Atrial fibrillation; CAD (coronary artery disease); CHF (congestive heart failure); Chronic kidney disease; Clotting disorder (9/13/2017); Colon polyp; Diabetes mellitus; Diabetes mellitus type II; Diverticulosis; Elevated PSA; Encounter for blood transfusion; Former smoker; GERD (gastroesophageal reflux disease); Gout; colonic polyps; Hypercoagulable state; Hyperlipidemia; Hypertension; MI, old (2010); Myocardial infarction; Prostate cancer (06/2016); Sleep apnea; and Venous stasis. The patient's chief complaint is diabetic foot ulcer, bottom right forefoot.  Dressed in football CDI.  Ambulating minimally in sx shoe right, DM shoe insert left.  Due for new ones asking if he can get a prescription today. This patient has documented high risk feet requiring routine maintenance secondary to diabetes mellitis and those secondary complications of diabetes, as mentioned..  Dressing CDI.  Denies signs infection today. No acute changes since last visit.        PCP: Familia Ramirez Jr, MD    Date Last Seen by PCP:   Chief Complaint   Patient presents with    Foot Ulcer     right    Diabetes Mellitus     PCP:  Dr Ramirez ([facility]) 1/4/18; HgbA1c: 9/19/17  6.7         Current shoe gear:  Affected Foot: sx shoe     Unaffected Foot: Rx diabetic extra depth shoes and custom accommodative insoles    History of Trauma: negative  Sign of Infection: none    Hemoglobin A1C   Date Value Ref Range Status   09/19/2017 6.7 (H)  4.0 - 5.6 % Final     Comment:     According to ADA guidelines, hemoglobin A1c <7.0% represents  optimal control in non-pregnant diabetic patients. Different  metrics may apply to specific patient populations.   Standards of Medical Care in Diabetes-2016.  For the purpose of screening for the presence of diabetes:  <5.7%     Consistent with the absence of diabetes  5.7-6.4%  Consistent with increasing risk for diabetes   (prediabetes)  >or=6.5%  Consistent with diabetes  Currently, no consensus exists for use of hemoglobin A1c  for diagnosis of diabetes for children.  This Hemoglobin A1c assay has significant interference with fetal   hemoglobin   (HbF). The results are invalid for patients with abnormal amounts of   HbF,   including those with known Hereditary Persistence   of Fetal Hemoglobin. Heterozygous hemoglobin variants (HbAS, HbAC,   HbAD, HbAE, HbA2) do not significantly interfere with this assay;   however, presence of multiple variants in a sample may impact the %   interference.     12/15/2014 4.7 4.5 - 6.2 % Final   06/17/2013 6.8 (H) 4.8 - 5.6 % Final     Comment:              Increased risk for diabetes: 5.7 - 6.4           Diabetes: >6.4           Glycemic control for adults with diabetes: <7.0  **In order to standardize %HbA1c results worldwide, as of October 11, 2010,  the %HbA1c is being calculated using the master equation recommended in the  consensus statement adopted by the ADA (American Diabetes Assoc), EASD  (European Assoc for the Study of Diabetes), IFCC (International Federation  of Clinical Chemistry and Laboratory Medicine) and IDF (International  Diabetes Federation). Result units: %HgbA1c (DCCT/NGSP).  In common with other methods, Hb A1C values may not accurately reflect mean  blood glucose in patients with hemoglobin variants (HgbF, HgbS and HgbC).  Any cause of shortened erythrocyte survival will reduce exposure of  erythrocytes to glucose with a consequent decrease in HbA1c (%)  values, even  though the time-averaged blood glucose level may be elevated. Causes of  shortened erythrocyte lifetime might be hemolytic anemia or other hemolytic  diseases, homozygous sickle cell trait, pregnancy, recent significant or  chronic blood loss, etc. Caution should be used when interpreting the HbA1c  results from patients with these conditions.   07/05/2012 5.7 4.0 - 6.2 % Final       Review of Systems   Constitution: Negative for chills, fever, malaise/fatigue and night sweats.   Cardiovascular: Negative for claudication, cyanosis and leg swelling.   Skin: Positive for poor wound healing. Negative for color change, itching, rash and suspicious lesions.   Musculoskeletal: Negative for falls, gout, joint pain and myalgias.   Neurological: Positive for numbness and sensory change.           Objective:      Physical Exam   Constitutional: He appears well-developed and well-nourished. He is cooperative. No distress.   Cardiovascular:   Pulses:       Dorsalis pedis pulses are 1+ on the right side, and 1+ on the left side.        Posterior tibial pulses are 1+ on the right side, and 1+ on the left side.   Toes warm, pink, cap fill <5 seconds.   Musculoskeletal:   Hallux valgus and flexor toe deformities both feet without evidence of injury or loss of function.   Lymphadenopathy:   Negative lymphadenopathy bilateral popliteal fossa and tarsal tunnel.  Negative streaking both feet.   Neurological: He has normal strength. He displays no tremor. A sensory deficit is present. He exhibits normal muscle tone.   Diminished/loss of protective sensation all toes bilateral to 10 gram monofilament.     Skin: Skin is warm and dry. No abrasion, no bruising, no burn, no ecchymosis, no laceration, no lesion and no rash noted. He is not diaphoretic. No erythema. No pallor.   Wound plantar 1st mtpj right closed today, epithelialized  without ulceration, drainage, pus, tracking, fluctuance, malodor, or cardinal signs  infection.      Otherwise, Skin thin, shiny, atrophic, with decreased density and distribution of pedal hair bilateral, but without hyperpigmentation, minerva discoloration,  ulcers, masses, nodules or cords palpated bilateral feet and legs.                 Assessment:       Encounter Diagnoses   Name Primary?    Healed ulcer of right foot on examination Yes    Peripheral vascular disease     Diabetic polyneuropathy associated with type 2 diabetes mellitus     Hammer toes of both feet     Corn or callus          Plan:       Richard was seen today for foot ulcer and diabetes mellitus.    Diagnoses and all orders for this visit:    Healed ulcer of right foot on examination    Peripheral vascular disease  -     DIABETIC SHOES FOR HOME USE    Diabetic polyneuropathy associated with type 2 diabetes mellitus  -     DIABETIC SHOES FOR HOME USE    Hammer toes of both feet  -     DIABETIC SHOES FOR HOME USE    Corn or callus  -     DIABETIC SHOES FOR HOME USE       I counseled the patient on his conditions, their implications and medical management.    Aperture pad on right foot around the first Metatarsal head, he was instructed to do the same for the next month. Return to supportive diabetic shoes. Watch daily for signs of re ulceration.    Prescription shoe was written    Adequate vitamin supplementation, protein intake, and hydration - discussed with patient.    Shoe inspection. Diabetic Foot Education. Patient reminded of the importance of good nutrition and blood sugar control to help prevent podiatric complications of diabetes. Patient instructed on proper foot hygeine. We discussed wearing proper shoe gear, daily foot inspections, never walking without protective shoe gear, never putting sharp instruments to feet        No Follow-up on file.  Follow up with Dr. Torrez in 1 month for checkup and routine care    [place] DPM

## 2018-01-11 ENCOUNTER — OFFICE VISIT (OUTPATIENT)
Dept: UROLOGY | Facility: CLINIC | Age: 73
End: 2018-01-11
Payer: MEDICARE

## 2018-01-11 VITALS
TEMPERATURE: 99 F | WEIGHT: 304.13 LBS | DIASTOLIC BLOOD PRESSURE: 71 MMHG | SYSTOLIC BLOOD PRESSURE: 136 MMHG | HEIGHT: 69 IN | HEART RATE: 62 BPM | BODY MASS INDEX: 45.04 KG/M2

## 2018-01-11 DIAGNOSIS — C61 PROSTATE CANCER: ICD-10-CM

## 2018-01-11 DIAGNOSIS — C61 PROSTATE CANCER: Primary | ICD-10-CM

## 2018-01-11 DIAGNOSIS — N32.81 OAB (OVERACTIVE BLADDER): ICD-10-CM

## 2018-01-11 DIAGNOSIS — N39.41 URGE INCONTINENCE: ICD-10-CM

## 2018-01-11 DIAGNOSIS — N28.1 RENAL CYST: ICD-10-CM

## 2018-01-11 LAB
BILIRUB SERPL-MCNC: ABNORMAL MG/DL
BLOOD URINE, POC: ABNORMAL
COLOR, POC UA: ABNORMAL
GLUCOSE UR QL STRIP: ABNORMAL
KETONES UR QL STRIP: ABNORMAL
LEUKOCYTE ESTERASE URINE, POC: ABNORMAL
NITRITE, POC UA: ABNORMAL
PH, POC UA: 5
PROTEIN, POC: ABNORMAL
SPECIFIC GRAVITY, POC UA: 1020
UROBILINOGEN, POC UA: ABNORMAL

## 2018-01-11 PROCEDURE — 81002 URINALYSIS NONAUTO W/O SCOPE: CPT | Mod: S$GLB,,, | Performed by: UROLOGY

## 2018-01-11 PROCEDURE — 99499 UNLISTED E&M SERVICE: CPT | Mod: S$GLB,,, | Performed by: UROLOGY

## 2018-01-11 PROCEDURE — 99999 PR PBB SHADOW E&M-EST. PATIENT-LVL V: CPT | Mod: PBBFAC,,, | Performed by: UROLOGY

## 2018-01-11 PROCEDURE — 96402 CHEMO HORMON ANTINEOPL SQ/IM: CPT | Mod: S$GLB,,, | Performed by: UROLOGY

## 2018-01-11 PROCEDURE — 99214 OFFICE O/P EST MOD 30 MIN: CPT | Mod: 25,S$GLB,, | Performed by: UROLOGY

## 2018-01-11 NOTE — PROGRESS NOTES
[facility] Progress Note - [place]  Urology Group: Isa/Brijesh/Becca/Galina  PCP: Dr.James newcomb [facility]    Chief Complaint: Follow-up      Subjective:        HPI: Richard Bustos is a 72 y.o. male presents with     Last seen 7/11/17(6 months ago)     Prostate cancer, Gl 4+4  Pt was referred to  for Gl 4+4, T2c, N0M0 prostate cancer in June 2016. Decided on radiation with concurrent ADT x 2 years, first Trelstar given 6/2016.. Underwent gold seed on 8/1/16. Pt completed 7560 Gy, 42 fractions of IRT (9/1/16-10/31/16).  Last saw  4 months ago  Most recent psa <0.01 on 1/4/18    Returns today stating he has had some pain in his legs, right worse than left- for the past 3 months. Started him on calcium and vit D and he's been taking this. Here today for trelstar    psa hx  1/4/18  <0.01, T 14  6/12/17 <0.01, T 13 - administered trelstar  12/12/16 <0.01 - adminstered trelstar   10/31/16 Completed radiation   8/1/16  3.3 - underwent goldseed  6/2016  adminstered trelstar  5/23/16 trus bx: Gl 4+4, volume 49.6g. Bone scan and CT scan negative  3/18/16 7.7  2/3/15   6.1  8/22/14  5.3  9/26/11  3.9  8/26/10  3.0    +lower back pain - has known protruding disc, but has not been worsening. No bone pain.     oab  -He states he tolerated the radiation but when I saw him in December he was having Frequency q2-3 hours, UUI that occured 3-4x a daily requiring 1 pad a day. Not on any med. Denies straining. Good stream.   -I started him on myrbetriq 50mg once a day in 2017 which improved his oab sx - no longer needed pads, UUI decreased to 2x a month (uses a cane).   -returns today stating he has not been taking the myrbetriq. Voiding q1-2 hours. +UUI 2x a day now.   -denies any weak stream. Denies hesistancy.   -Takes lasix with increased frequency. Still drinking caffeine (coffee in the am).     Right renal hemhorragic cysts  rbus 1/18/17 confirmed these were  cysts    Denies any other uti symptoms - no hesistancy, good stream.     AUASSx: 6, mixed (previously 15)  IIEF: 2, (previously 6)    REVIEW OF SYSTEMS:  General ROS: no fevers, no chills  Psychological ROS: no depression  Endocrine ROS: no heat or cold  Respiratory ROS: no SOB  Cardiovascular ROS: no CP  Gastrointestinal ROS: no abdominal pain, no constipation, no diarrhea, no BRBPR  Musculoskeletal ROS: no muscle pain  Neurological ROS: no headaches  Dermatological ROS: no rashes  HEENT: +glasses, no sinus   ROS: per HPI    The past medical, surgical, social and family hx were reviewed. There have not been any changes.    Cataracts? removed    Urologic meds: none  Anticoagulation: Yes - coumadin for hypercoagulable state    Objective:     Vitals:    01/11/18 1152   BP: 136/71   Pulse: 62   Temp: 98.7 °F (37.1 °C)          Physical Exam   Constitutional: He is oriented to person, place, and time. He appears well-developed and well-nourished. He is cooperative. No distress.   HENT:   Head: Normocephalic and atraumatic.   Eyes: Pupils are equal, round, and reactive to light.   Cardiovascular: Normal rate and regular rhythm.    Pulmonary/Chest: Effort normal.   Abdominal: Soft. Normal appearance.   Musculoskeletal: Normal range of motion.   Neurological: He is alert and oriented to person, place, and time. He has normal reflexes.   Skin: Skin is intact.     Psychiatric: He has a normal mood and affect.         Urinalysis: 1.020/5/2+protein    Labs:  9/19/17 Cr 1.6, seen by     Path:     LEFT    RIGHT  BASE   3+3 (1/2),  4+3 (1/2)     MID    4+4 (1/2)    b9  APEX    b9      b9   Date of Biopsy: 5/23/16  PSA: 7.7  Volume: 49.6  Prostate nodule: no    Rads:   1/18/17 rbus  Multiple bilateral renal cysts  4 right renal hemhorragic cysts - 1.8 cm, 6.8 cm, 4.5 cm and 1.6     6/14/16   ctap   Bilateral renal masses  No significant adenopathy  8cm complex fluid collection    Bone scan  6/14/16  Negative    Assessment:       1. Prostate cancer    2. OAB (overactive bladder)    3. Renal cyst    4. Urge incontinence        Plan:     Prostate cancer, Gl 4+3 treated with radiation and ADT  -here for ADT/trelstar today (has chronic subq hematoma from 2015)  -psa, T and ADT 6 carmelina  -ADT x 2 years (finished June 2018), last shot today.   -continue Vit D and calcium  -will get a bone density scan: pt c/o pain in lower legs     While on ADT will take the following:   -wrote for aldenornate/vit D3 - fosamax 70mg/2800 once a week, SE discussed   -1000mg calcium daily (2 tums a day)    Overactive bladder and urge incontinence  -restart myrbetriq 50mg once daily  -avoid all caffeinated drinks    hemhorragic renal cyst  - rbus 1/2017 done and these were confirmed to be multiple right renal hemhorragic cysts    F/u 3 months to see how he is doing on oab med  F/u 6 months with T, PSA  psa 3 months after we stop trelstar for 2 years then every 6 months for 3 years then yearly indefinitely.     Referral to dermatology for new facial skin lesions    I have routed a letter back to  and  for the consult on date of service 1/11/18.

## 2018-01-11 NOTE — PATIENT INSTRUCTIONS
Assessment:       1. Prostate cancer    2. OAB (overactive bladder)    3. Renal cyst        Plan:     Prostate cancer, Gl 4+3 treated with radiation and ADT  -here for ADT/trelstar today (has chronic subq hematoma from 2015)  -psa, T and ADT 6 carmelina  -ADT x 2 years (finished June 2018), last shot today.   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

## 2018-01-12 ENCOUNTER — HOSPITAL ENCOUNTER (OUTPATIENT)
Dept: RADIOLOGY | Facility: HOSPITAL | Age: 73
Discharge: HOME | End: 2018-01-12
Attending: UROLOGY
Payer: MEDICARE

## 2018-01-12 DIAGNOSIS — N28.1 RENAL CYST: ICD-10-CM

## 2018-01-12 DIAGNOSIS — C61 PROSTATE CANCER: ICD-10-CM

## 2018-01-12 DIAGNOSIS — N32.81 OAB (OVERACTIVE BLADDER): ICD-10-CM

## 2018-01-12 PROCEDURE — 77085 DXA BONE DENSITY AXL VRT FX: CPT | Mod: TC

## 2018-01-12 PROCEDURE — 77085 DXA BONE DENSITY AXL VRT FX: CPT | Mod: 26,,, | Performed by: RADIOLOGY

## 2018-01-19 LAB — INR PPP: 1.2 (ref 2–3)

## 2018-01-22 ENCOUNTER — ANTI-COAG VISIT (OUTPATIENT)
Dept: CARDIOLOGY | Facility: CLINIC | Age: 73
End: 2018-01-22

## 2018-01-22 ENCOUNTER — INITIAL CONSULT (OUTPATIENT)
Dept: DERMATOLOGY | Facility: CLINIC | Age: 73
End: 2018-01-22
Payer: MEDICARE

## 2018-01-22 VITALS — HEIGHT: 69 IN | BODY MASS INDEX: 45.03 KG/M2 | WEIGHT: 304 LBS

## 2018-01-22 DIAGNOSIS — L57.0 ACTINIC KERATOSES: ICD-10-CM

## 2018-01-22 DIAGNOSIS — D18.01 CHERRY ANGIOMA: ICD-10-CM

## 2018-01-22 DIAGNOSIS — D68.59 HYPERCOAGULABLE STATE: ICD-10-CM

## 2018-01-22 DIAGNOSIS — L81.4 SOLAR LENTIGO: ICD-10-CM

## 2018-01-22 DIAGNOSIS — D48.9 NEOPLASM OF UNCERTAIN BEHAVIOR: Primary | ICD-10-CM

## 2018-01-22 DIAGNOSIS — L82.1 SEBORRHEIC KERATOSES: ICD-10-CM

## 2018-01-22 DIAGNOSIS — Z79.01 LONG TERM (CURRENT) USE OF ANTICOAGULANTS: Primary | ICD-10-CM

## 2018-01-22 PROCEDURE — 88305 TISSUE EXAM BY PATHOLOGIST: CPT | Performed by: PATHOLOGY

## 2018-01-22 PROCEDURE — 99999 PR PBB SHADOW E&M-EST. PATIENT-LVL III: CPT | Mod: PBBFAC,,, | Performed by: DERMATOLOGY

## 2018-01-22 PROCEDURE — 17000 DESTRUCT PREMALG LESION: CPT | Mod: S$GLB,,, | Performed by: DERMATOLOGY

## 2018-01-22 PROCEDURE — 17003 DESTRUCT PREMALG LES 2-14: CPT | Mod: S$GLB,,, | Performed by: DERMATOLOGY

## 2018-01-22 PROCEDURE — 11100 PR BIOPSY OF SKIN LESION: CPT | Mod: 59,S$GLB,, | Performed by: DERMATOLOGY

## 2018-01-22 PROCEDURE — 99202 OFFICE O/P NEW SF 15 MIN: CPT | Mod: 25,S$GLB,, | Performed by: DERMATOLOGY

## 2018-01-22 NOTE — LETTER
February 21, 2018    Richard Bustos  [address] [place] [address]             [facility]  [address] [facility] [address]  Phone: [phone] Dear MR. Richard Bustos:    We are sorry that you missed your appointment at [facility] to have your INR level checked on 02/14/2018. Your health and follow-up medical care are important to us. Please call our office as soon as possible so that we may reschedule your appointment. If you have already rescheduled your appointment, please disregard this letter.    Sincerely,        Yanely LUIS MA

## 2018-01-22 NOTE — PATIENT INSTRUCTIONS
Shave Biopsy Wound Care    Your doctor has performed a shave biopsy today.  A band aid and vaseline ointment has been placed over the site.  This should remain in place for 24 hours.  It is recommended that you keep the area dry for the first 24 hours.  After 24 hours, you may remove the band aid and wash the area with warm soap and water and apply Vaseline jelly.  Many patients prefer to use Neosporin or Bacitracin ointment.  This is acceptable; however, know that you can develop an allergy to this medication even if you have used it safely for years.  It is important to keep the area moist.  Letting it dry out and get air slows healing time, and will worsen the scar.  Band aid is optional after first 24 hours.      If you notice increasing redness, tenderness, pain, or yellow drainage at the biopsy site, please notify your doctor.  These are signs of an infection.    If your biopsy site is bleeding, apply firm pressure for 15 minutes straight.  Repeat for another 15 minutes, if it is still bleeding.   If the surgical site continues to bleed, then please contact your doctor.       [facility]  [facility]  [address]  [place] [address]  Dept: [phone]       CRYOSURGERY      Your doctor has used a method called cryosurgery to treat your skin condition. Cryosurgery refers to the use of very cold substances to treat a variety of skin conditions such as warts, pre-skin cancers, molluscum contagiosum, sun spots, and several benign growths. The substance we use in cryosurgery is liquid nitrogen and is so cold (-195 degrees Celsius) that is burns when administered.     Following treatment in the office, the skin may immediately burn and become red. You may find the area around the lesion is affected as well. It is sometimes necessary to treat not only the lesion, but a small area of the surrounding normal skin to achieve a good response.     A blister, and even a blood filled blister,  may form after treatment.   This is a normal response. If the blister is painful, it is acceptable to sterilize a needle and with rubbing alcohol and gently pop the blister. It is important that you gently wash the area with soap and warm water as the blister fluid may contain wart virus if a wart was treated. Do no remove the roof of the blister.     The area treated can take anywhere from 1-3 weeks to heal. Healing time depends on the kind skin lesion treated, the location, and how aggressively the lesion was treated. It is recommended that the areas treated are covered with Vaseline or bacitracin ointment and a band-aid. If a band-aid is not practical, just ointment applied several times per day will do. Keeping these areas moist will speed the healing time.    Treatment with liquid nitrogen can leave a scar. In dark skin, it may be a light or dark scar, in light skin it may be a white or pink scar. These will generally fade with time, but may never go away completely.     If you have any concerns after your treatment, please feel free to call the office.         [facility]  [facility]  [address] [address]  Dept: [phone]

## 2018-01-22 NOTE — LETTER
January 22, 2018      Adeline Moss MD  [address] [place] [address]   [address]  [place] [address]           [place] [facility]  [address]  [place] [address]  Phone: [phone]          Patient: Richard Bustos   MR Number: [id number]   YOB: 1945   Date of Visit: 1/22/2018       Dear Dr. Adeline Moss:    Thank you for referring Richard Bustos to me for evaluation. Attached you will find relevant portions of my assessment and plan of care.    If you have questions, please do not hesitate to call me. I look forward to following Richard Bustos along with you.    Sincerely,    Alana Brown MD    Enclosure  CC:  No Recipients    If you would like to receive this communication electronically, please contact [email or web address] or [phone] to request more information on [employer] Link access.    For providers and/or their staff who would like to refer a patient to [facility], please contact us through our one-stop-shop provider referral line, [facility], at [phone].    If you feel you have received this communication in error or would no longer like to receive these types of communications, please e-mail [email or web address]

## 2018-01-22 NOTE — PROGRESS NOTES
Subjective:       Patient ID:  Richard Bustos is a 72 y.o. male who presents for   Chief Complaint   Patient presents with    Spot     Face     Initial visit  No h./o skin cancer, no h/o lesions biopsied + skin tags removal, no h/o cryotherapy  Endorses lilfelong intense sun exposure    On coumadin        Spot  - Initial  Affected locations: face  Duration: 2 months  Signs / symptoms: dryness (come/goes)  Severity: mild  Timing: constant  Aggravated by: nothing  Treatments tried: Vaseline.        Review of Systems   Constitutional: Negative for fever, chills and fatigue.   Skin: Positive for wears hat. Negative for daily sunscreen use and activity-related sunscreen use.   Psychiatric/Behavioral: Stress: coumadin.   Hematologic/Lymphatic: Bruises/bleeds easily.        Objective:    Physical Exam   Constitutional: He appears well-developed and well-nourished. No distress.   Neurological: He is alert and oriented to person, place, and time. He is not disoriented.   Psychiatric: He has a normal mood and affect.   Skin:   Areas Examined (abnormalities noted in diagram):   Scalp / Hair Palpated and Inspected  Head / Face Inspection Performed  Neck Inspection Performed  Back Inspection Performed  RUE Inspected  LUE Inspection Performed                   Diagram Legend     Erythematous scaling macule/papule c/w actinic keratosis       Vascular papule c/w angioma      Pigmented verrucoid papule/plaque c/w seborrheic keratosis      Yellow umbilicated papule c/w sebaceous hyperplasia      Irregularly shaped tan macule c/w lentigo     1-2 mm smooth white papules consistent with Milia      Movable subcutaneous cyst with punctum c/w epidermal inclusion cyst      Subcutaneous movable cyst c/w pilar cyst      Firm pink to brown papule c/w dermatofibroma      Pedunculated fleshy papule(s) c/w skin tag(s)      Evenly pigmented macule c/w junctional nevus     Mildly variegated pigmented, slightly irregular-bordered macule c/w mildly  atypical nevus      Flesh colored to evenly pigmented papule c/w intradermal nevus       Pink pearly papule/plaque c/w basal cell carcinoma      Erythematous hyperkeratotic cursted plaque c/w SCC      Surgical scar with no sign of skin cancer recurrence      Open and closed comedones      Inflammatory papules and pustules      Verrucoid papule consistent consistent with wart     Erythematous eczematous patches and plaques     Dystrophic onycholytic nail with subungual debris c/w onychomycosis     Umbilicated papule    Erythematous-base heme-crusted tan verrucoid plaque consistent with inflamed seborrheic keratosis     Erythematous Silvery Scaling Plaque c/w Psoriasis     See annotation          Assessment / Plan:      Pathology Orders:     Normal Orders This Visit    Tissue Specimen To Pathology, Dermatology     Questions:    Directional Terms:  Other(comment)    Clinical information:  Hyperkeratotic pink papule, r/o SCC    Specific Site:  left helix        Neoplasm of uncertain behavior  -     Tissue Specimen To Pathology, Dermatology  Shave biopsy procedure note:    Shave biopsy performed after verbal consent including risk of infection, scar, recurrence, need for additional treatment of site. Area prepped with alcohol, anesthetized with approximately 1.0cc of 1% lidocaine with epinephrine. Lesional tissue shaved with razor blade. Hemostasis achieved with application of aluminum chloride followed by hyfrecation. No complications. Dressing applied. Wound care explained.    Actinic keratoses  Cryosurgery Procedure Note    Verbal consent from the patient is obtained and the patient is aware of the precancerous quality and need for treatment of these lesions. Liquid nitrogen cryosurgery is applied to the 7 actinic keratoses, as detailed in the physical exam, to produce a freeze injury. The patient is aware that blisters may form and is instructed on wound care with gentle cleansing and use of vaseline ointment to keep  moist until healed. The patient is supplied a handout on cryosurgery and is instructed to call if lesions do not completely resolve.    Solar lentigo  This is a benign hyperpigmented sun induced lesion. Daily sun protection will reduce the number of new lesions. Treatment of these benign lesions are considered cosmetic.    Seborrheic keratoses  These are benign inherited growths without a malignant potential. Reassurance given to patient. No treatment is necessary.     Cherry angioma  This is a benign vascular lesion. Reassurance given. No treatment required.     Patient instructed in importance in daily sun protection of at least spf 30. Sun avoidance and topical protection discussed.   Recommend Elta MD (physician office) COTZ sensitive (available online) for daily use on face and neck.  Patient encouraged to wear hat for all outdoor exposure.   Also discussed sun protective clothing.               No Follow-up on file.

## 2018-01-24 LAB
25(OH)D3 SERPL-MCNC: 29 NG/ML (ref 30–100)
APPEARANCE UR: CLEAR
BACTERIA #/AREA URNS HPF: ABNORMAL /HPF
BASOPHILS # BLD AUTO: 28 CELLS/UL (ref 0–200)
BASOPHILS NFR BLD AUTO: 0.4 %
BILIRUB UR QL STRIP: NEGATIVE
CALCIUM SERPL-MCNC: 9.4 MG/DL (ref 8.6–10.3)
COLOR UR: ABNORMAL
COPY(IES) SENT TO:: NORMAL
CYSTATIN C SERPL-MCNC: 2.48 MG/L (ref 0.52–1.16)
EOSINOPHIL # BLD AUTO: 112 CELLS/UL (ref 15–500)
EOSINOPHIL NFR BLD AUTO: 1.6 %
ERYTHROCYTE [DISTWIDTH] IN BLOOD BY AUTOMATED COUNT: 12.8 % (ref 11–15)
GFR/BSA.PRED SERPLBLD CYS-BASED-ARV: 22 ML/MIN/1.73M2
GLUCOSE UR QL STRIP: NEGATIVE
HCT VFR BLD AUTO: 33.5 % (ref 38.5–50)
HGB BLD-MCNC: 11.5 G/DL (ref 13.2–17.1)
HGB UR QL STRIP: NEGATIVE
HYALINE CASTS #/AREA URNS LPF: ABNORMAL /LPF
INR PPP: 1.2
KETONES UR QL STRIP: NEGATIVE
LEUKOCYTE ESTERASE UR QL STRIP: NEGATIVE
LYMPHOCYTES # BLD AUTO: 665 CELLS/UL (ref 850–3900)
LYMPHOCYTES NFR BLD AUTO: 9.5 %
MCH RBC QN AUTO: 33.6 PG (ref 27–33)
MCHC RBC AUTO-ENTMCNC: 34.3 G/DL (ref 32–36)
MCV RBC AUTO: 98 FL (ref 80–100)
MONOCYTES # BLD AUTO: 483 CELLS/UL (ref 200–950)
MONOCYTES NFR BLD AUTO: 6.9 %
NEUTROPHILS # BLD AUTO: 5712 CELLS/UL (ref 1500–7800)
NEUTROPHILS NFR BLD AUTO: 81.6 %
NITRITE UR QL STRIP: NEGATIVE
PH UR STRIP: 5.5 [PH] (ref 5–8)
PHOSPHATE SERPL-MCNC: 4.4 MG/DL (ref 2.1–4.3)
PLATELET # BLD AUTO: 214 THOUSAND/UL (ref 140–400)
PMV BLD REES-ECKER: 10.7 FL (ref 7.5–12.5)
PROT UR QL STRIP: ABNORMAL
PROTHROMBIN TIME: 12.6 SEC (ref 9–11.5)
PTH-INTACT SERPL-MCNC: 160 PG/ML (ref 14–64)
RBC # BLD AUTO: 3.42 MILLION/UL (ref 4.2–5.8)
RBC #/AREA URNS HPF: ABNORMAL /HPF
SP GR UR STRIP: 1.02 (ref 1–1.03)
SQUAMOUS #/AREA URNS HPF: ABNORMAL /HPF
WBC # BLD AUTO: 7 THOUSAND/UL (ref 3.8–10.8)
WBC #/AREA URNS HPF: ABNORMAL /HPF

## 2018-01-26 ENCOUNTER — OFFICE VISIT (OUTPATIENT)
Dept: CARDIOLOGY | Facility: CLINIC | Age: 73
End: 2018-01-26
Payer: MEDICARE

## 2018-01-26 VITALS
HEART RATE: 69 BPM | BODY MASS INDEX: 44.8 KG/M2 | HEIGHT: 69 IN | OXYGEN SATURATION: 94 % | DIASTOLIC BLOOD PRESSURE: 83 MMHG | WEIGHT: 302.5 LBS | SYSTOLIC BLOOD PRESSURE: 183 MMHG

## 2018-01-26 DIAGNOSIS — I25.10 CORONARY ARTERY DISEASE INVOLVING NATIVE CORONARY ARTERY OF NATIVE HEART WITHOUT ANGINA PECTORIS: Primary | Chronic | ICD-10-CM

## 2018-01-26 DIAGNOSIS — E78.00 PURE HYPERCHOLESTEROLEMIA: ICD-10-CM

## 2018-01-26 DIAGNOSIS — Z79.01 LONG TERM (CURRENT) USE OF ANTICOAGULANTS: ICD-10-CM

## 2018-01-26 DIAGNOSIS — N18.30 TYPE 2 DIABETES MELLITUS WITH STAGE 3 CHRONIC KIDNEY DISEASE, WITHOUT LONG-TERM CURRENT USE OF INSULIN: Chronic | ICD-10-CM

## 2018-01-26 DIAGNOSIS — D68.59 HYPERCOAGULABLE STATE: ICD-10-CM

## 2018-01-26 DIAGNOSIS — N18.30 ANEMIA OF CHRONIC RENAL FAILURE, STAGE 3 (MODERATE): ICD-10-CM

## 2018-01-26 DIAGNOSIS — G47.33 OBSTRUCTIVE SLEEP APNEA SYNDROME: Chronic | ICD-10-CM

## 2018-01-26 DIAGNOSIS — E11.59 HYPERTENSION ASSOCIATED WITH DIABETES: ICD-10-CM

## 2018-01-26 DIAGNOSIS — I48.0 PAROXYSMAL ATRIAL FIBRILLATION: ICD-10-CM

## 2018-01-26 DIAGNOSIS — D63.1 ANEMIA OF CHRONIC RENAL FAILURE, STAGE 3 (MODERATE): ICD-10-CM

## 2018-01-26 DIAGNOSIS — R94.39 ABNORMAL CARDIOVASCULAR STRESS TEST: ICD-10-CM

## 2018-01-26 DIAGNOSIS — I51.9 LV DYSFUNCTION: ICD-10-CM

## 2018-01-26 DIAGNOSIS — E11.22 TYPE 2 DIABETES MELLITUS WITH STAGE 3 CHRONIC KIDNEY DISEASE, WITHOUT LONG-TERM CURRENT USE OF INSULIN: Chronic | ICD-10-CM

## 2018-01-26 DIAGNOSIS — I71.20 THORACIC AORTIC ANEURYSM WITHOUT RUPTURE: Chronic | ICD-10-CM

## 2018-01-26 DIAGNOSIS — E66.01 MORBID OBESITY: ICD-10-CM

## 2018-01-26 DIAGNOSIS — I77.9 CAROTID DISEASE, BILATERAL: Chronic | ICD-10-CM

## 2018-01-26 DIAGNOSIS — I15.2 HYPERTENSION ASSOCIATED WITH DIABETES: ICD-10-CM

## 2018-01-26 DIAGNOSIS — N18.30 CKD (CHRONIC KIDNEY DISEASE) STAGE 3, GFR 30-59 ML/MIN: ICD-10-CM

## 2018-01-26 PROCEDURE — 99999 PR PBB SHADOW E&M-EST. PATIENT-LVL V: CPT | Mod: PBBFAC,,, | Performed by: INTERNAL MEDICINE

## 2018-01-26 PROCEDURE — 99215 OFFICE O/P EST HI 40 MIN: CPT | Mod: S$GLB,,, | Performed by: INTERNAL MEDICINE

## 2018-01-26 PROCEDURE — 99499 UNLISTED E&M SERVICE: CPT | Mod: S$GLB,,, | Performed by: INTERNAL MEDICINE

## 2018-01-26 NOTE — PROGRESS NOTES
Patient ID: Richard Bustos is a 69 y.o. male who presents for follow-up of Follow-up  For CAD, chronic fatigue, 6 months follow up  PCP: Dr. Ramirez, see every 4 months, does labs through [facility]  Hematologist: Dr. Quezada  Urologist: Dr. Moss  Podiatrist: Amy Lyon  Wound care: Dr. Torrez  Pain: Dr. 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 Suffered a NSTEMI in 8/2010, catheterization showed SUMMARY:   1. Three vessel coronary artery disease.   2. Successful PCI. Involved 2 JAYCEE in the LAD and OM1.    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 Off diet at times, had underwent gastri bypass in 2008.    Last Echo in 2010 showed CONCLUSIONS   1 - Mild left ventricular enlargement.   2 - Normal left ventricular function (EF 58%).   3 - Diastolic dysfunction.   4 - Biatrial enlargement.   5 - Mildly to moderately enlarged ascending aorta.    Last carotid US: 9/13/2010  Bilateral 20%-40% narrowing.  No symptoms.    Coronary Artery Disease  Presents for follow-up visit. Pertinent negatives include no chest pain, dizziness, leg swelling, muscle weakness, palpitations, shortness of breath or weight gain. Risk factors include hyperlipidemia. The symptoms have been resolved. Compliance with diet is variable. Compliance with exercise is good. Compliance with medications is good.   Hyperlipidemia  Pertinent negatives include no chest pain, focal weakness, myalgias or shortness of breath.     EKG shows sinus bradycardia, rate 45, today  "NSR, 75, VPC, and nonspecific STTW abnormalities.     Since visit of 7/2012, have lots of stress with tenants, BP noted to be elevated, log reviewed 153-174/, HR 52-74. Had cardiac testing -   ECHO CONCLUSIONS 7/2012   1 - Mild left ventricular enlargement.   2 - Mildly to moderately depressed left ventricular function (EF 40%).   3 - Eccentric hypertrophy.   4 - Mildly to moderately enlarged ascending aorta.   5 - Mild left atrial enlargement.   6 - Normal diastolic function.   7 - Mild aortic regurgitation.   8 - Suggestion for inferoposterior hypokinesia.   9 - Compare to 8/17/2010, there is increase in LVH with decrease in   LVEF from 58%, and new inferoposterior hypokinesia. The Aortic dimensions   have not changed.    Carotid US, 7/2012  IMPRESSION   Findings consistent with less than 50% diameter stenosis of proximal   internal carotid arteries by NASCAET criteria. Flow in the vertebral   arteries appears antegrade bilaterally.    Since visit of 1/23, still with occasional angina, average once a week, lasting about a minute, "light" grade 3-4/10.  Workup:  ECHO, 2/5/20163, CONCLUSIONS   1 - Mild left ventricular enlargement.   2 - Mildly to moderately depressed left ventricular function (EF 40%).   3 - Normal diastolic function.   4 - Inferoposterior hypokinesia consistent with ischemic disease..   5 - Moderately enlarged ascending aorta. 4.4 cm - stable   6 - No significant change from study of 7/17/2012.    Lexiscan, 2/5/2013  Nuclear Quantitative Functional Analysis:   LVEF: 34 % (normal is 47 - 59)   LVED Volume: 131 ml (normal is 91 - 155)   LVES Volume: 87 ml (normal is 40 - 78)   Impression: ABNORMAL MYOCARDIAL PERFUSION   1. There is evidence for mild to moderate myocardial ischemia in the   lateral apical wall of the left ventricle with associated stress induced   LV cavity dilatation with underlying injury present.   2. There is moderate intensity fixed defect in the inferolateral wall of "   the left ventricle, consistent with myocardial injury. There is trivial   francine-injury ischemia.   3. There is abnormal wall motion at rest showing akinesis of the lateral   wall of the left ventricle, moderate global hypokinesis of the left   ventricle. dyskinesis of the inferolateral wall of the left ventricle.   4. There is resting LV dysfunction with a reduced ejection fraction of 34   %. (normal is 47 - 59)   5. The left ventricular volume is mildly increased at rest.   6. The extracardiac distribution of radioactivity is normal.  7. [facility] SSS =15, SRS =10, SDS =5, suggest 6.25% of myocardium may be   ischemic.    Since visit of 2/8, noticed some bilateral leg weakness, post bilateral THR, also occasional charley horse at night, mostly right sided. Today had mild chest pain, grade 2/10, while driving here. Claims to have not heard from [facility], INR on 2/18 was 1.8. Question about Plavix answered. Discussed with daughter, Citlali, over the telephone.     Since visit 2/22, no new problem. Discussed aortic aneurysm, will need at least annual imaging, do not recall the last time. Have claustrophobia requesting Xanax. No problem with the medications, stays very active. Weight reviewed was below 290 lbs in 12/2011.   Apparently likes [email or web address]!     Since visit of 3/16, had MRA done with use of Xanax and hydrocodone,   Result - Max ascending aorta 4.3 cm stable.  Tolerating medications without problem, using BiPAP nightly, still lot of stress with Rental Properties. Discuss need for exercise program with weigh reduction of 10%. New problem include left leg weakness with localized pain behind the knee. For about 2 weeks, been dancing with new woman.    Since visit of 4/11/2013, hospitalized 2 weeks ago [place] for large hematoma due to use of Lovenox as bridge for oral surgery. Coumadin on hold, seems to be getting smaller and softer. Denies any heart symptoms or CP nor SOB. No  problem with taking medications and using CPAP. Slowed down due to leg pain and fatigue. Feels good in the AM.    Since visit of 4/9/2014, complain of leg weakness, noted since 2/2013. Denies any CP nor SOB. Miss 2-3 doses of medications monthly. No other problem with medications. Awaken all night due to dog, sleep 10-4. Feels tired in the morning despite using CPAP. Have not had much blood work done. ECG shows AF rate 67, RBBB.  ECHO in 5/2014 CONCLUSIONS   1 - Enlarged aortic root. measuring 3.9 cm at sinotubular junction.  2 - Biatrial enlargement.   3 - Eccentric hypertrophy.   4 - Mildly to moderately depressed left ventricular systolic function (EF 40-45%).   5 - Normal left ventricular diastolic function.   6 - Right ventricular enlargement with normal systolic function.   7 - No significant change from echo on 2/5/2013..   8 - Difficult apical window, Optison contrast used for wall motion analysis..     Since visit of 11/21, no new problem. Some concern of HR down to 40 after exercise. Noted easy fatigue but remains quite sedentary. Discussed need for Coreg titration for LV dysfunction. Labs reviewed, CKD eith eGFR of 47, mild anemia Hgb 12.1, , A1C 4.7, and proteinuria.   Cardiac workup:  Carotid US, 12/2014 - CONCLUSIONS   There is 0 - 19% right Internal Carotid stenosis.  There is 0 - 19% left Internal Carotid stenosis.    Clean vessels    Lexiscan Nuclear Quantitative Functional Analysis:   LVEF: 38 % (normal is 47 - 59)  LVED Volume: 172 ml (normal is 91 - 155)  LVES Volume: 107 ml (normal is 40 - 78)    Impression: ABNORMAL MYOCARDIAL PERFUSION  1. There is evidence for mild to moderate myocardial ischemia in the inferior and lateral walls of the left ventricle with associated stress induced LV cavity dilatation with underlying injury present.   2. There is moderate to severe intensity fixed defect in the inferolateral wall of the left ventricle, consistent with myocardial injury.   3. There is  abnormal wall motion at rest showing moderate global hypokinesis of the left ventricle. severe hypokinesis of the inferior and septal walls of the left ventricle.   4. There is resting LV dysfunction with a reduced ejection fraction of 38 %. (normal is 47 - 59)  5. The left ventricular volume is moderately increased at rest.   6. The extracardiac distribution of radioactivity is normal.   7. When compared to the previous study from 02/05/2013, no significant changes noted.  8. [facility] SSS=15, SRS=8, and SDS=7, suggest that 8.7% of myocardium may be ischemic.    Holter, 12/2014:  PREDOMINANT RHYTHM  1. Sinus arrhythmia with heart rates varying between 41 and 110 bpm with an average of 64 bpm.     VENTRICULAR ARRHYTHMIAS  1. There were frequent mostly monomorphic PVCs totalling 1297 and averaging 54 per hour. There was 1 couplet.    2. 1.6% of complexes.    3. There were no episodes of ventricular tachycardia.    SUPRA VENTRICULAR ARRHYTHMIAS  1. There were rare PACs totalling 108 and averaging 4 per hour.     2. There were no episodes of sustained supraventricular tachycardia.    SINUS NODE FUNCTION  1. There was no evidence of high grade SA sang block.     AV CONDUCTION  1. There was no evidence of high grade AV block.     DIARY  1. The diary was not returned    MISCELLANEOUS  1. There were persistent hookup related artifacts. Overall, the study was of good quality.     2. This was a tape of adequate length (24 hrs).     Since visit of 12/23/2014, underwent C with PCI of RCA. Off ASA due to rectal bleed. Discuss use of triple Rx for hopefully 3 months post JAYCEE implant. Feeling more energetic, no CP nor SOB. Discussed ascending Aortic aneurysm, last checked in 5/2014 with Echo - stable.   HEMODYNAMIC RESULTS:    LVEDP (Pre): 16 mmHg  LVEDP (Post): 18 mmHg  Ejection Fraction: 40%  Global LV Function: moderately depressed  BP: 109/70  HR: 96    Air rest:  LVEDP: 18    C. ANGIOGRAPHIC  RESULTS:    DIAGNOSTIC:  Patient has a right dominant coronary artery.     - Left Main Coronary Artery:  The LM is normal. There is DAREK 3 flow.    - Left Anterior Descending Artery:  The mid LAD has a 50% stenosis. There is DAREK 3 flow. The remaining portion of the vessel has multiple lesions < 50% stenosed. Long stent noted in mid-LAD, 50% ISR.    - Left Circumflex Artery:  The LCX has luminal irregularities. There is DAREK 3 flow.    - Right Coronary Artery:  The mid RCA has a 75% stenosis. There is DAREK 3 flow. The remaining portion of the vessel has luminal irregularities. Appears to be close to distal end of previously placed stent.    - Aortic Root:  The Aortic Root is normal. There is DAREK 3 flow.      D. SUMMARY:    1. Two vessel coronary artery disease.  2. Diastolic dysfunction.  3. - Very difficult case due to tortuosity of the innominate artery, multiple manipulation needed for cannulation of CA.  4. - RCA lesion appears as a large plaque with significant luminal stenois.    E. RECOMMENDATIONS:    1. Continue medical management.  2. Cardiac rehab referral.  3. Weight loss.  4. Follow up with Dr. Familia Tate.  5. Schedule for PCI.  6. - Hydration overnight, eGFR 47.  7. - Dr. Gallegos consulted for PCI of RCA  8. - On chronic warfarin Rx, on hold for now  9. - Start  mg today and daily for total of 5 days as warfarin is resumed post PCI  10. - Start Plavix, load with 300 mg today then 75 mg daily in addition to warfarin.    PCI INTERVENTION:    Proximal RCA:  The lesion was successfully intervened. Post-stenosis of 0% and post-DAREK 3 flow. The vessel was accessed natively. The following items were used: Stent Resolute Rx 4.00x30 (JAYCEE).    C. SUMMARY:    1. Successful PCI/JAYCEE of the proximal RCA.    D. RECOMMENDATIONS:    1. Routine post PCI care.  2. Risk factor reductions.  3. ASA 81mg.  4. Clopidogrel (Plavix) for one year.  5. Weight loss.    Since visit of 1/22/2015, no CP nor SOB. Been compliant  "with medications, no problem. Noted venous ulcer in the left leg about a week ago. Dressing with peroxide. Lab from [facility] reviewed, K+ remains 5.5 with stable Cr of 1.78, eGFR 39. Active with rental properties upkeep.     Since visit of 3/27, no new problem, difficulties in getting lab results from [facility]. Reviewed eGFR 39, K+ 5.5, will need to stay with low K+ diet. Problem with venous stasis ulceration in the left leg. Worked with Wound Care for 2 weeks, told to return if problem.     Since visit of 4/23,no new problem, feeling "pretty good". In process of selling rental properties. Active with walking about 4 miles a day, takes about an hour. Legs better with ACE stocking. Review lab, BMP on 5/15/2015 K+ 4.7, stable renal function with eGFR 41, CBC in 1/2015 Hgb 11.8, last Ferritin level in 4/2011.  ECHO, 5/2015, CONCLUSIONS   1 - Moderately enlarged aortic root. measuring 4.1 cm at sinotubular junction.  2 - Concentric hypertrophy. Wall thickness is increased, with the septum and the posterior wall each measuring 1.4 cm across.  3 - Low normal to mildly depressed left ventricular systolic function (EF 50-55%).   4 - Normal left ventricular diastolic function.   5 - Right ventricular enlargement with normal systolic function.   6 - Difficult windows, Optison contrast used for wall motion analysis.   7 - Suggestion for some improvement in LVEF from Echo on 5/7/2014.     Since visit of 5/29/2015, more active, compliant with medications, PT twice weekly for an hour, no problem. Last BMP in 5/2015, K+ 4.7, Cr 1.7, eGFR 41. Under care of Dr. Torrez for venous stasis and ulcer. Uses support hose occasionally due to the hot weather.    Since visit of 9/10/2015, no new problem, less stress, no regular exercise. Had completed phase II Cardiac Rehab. Asked to consider phase III. Home BP is good, 135. Citlali fixes medications, don't skip.    Since visit of 2/8/2016, no new problem. Undergoing urologic evaluation for kidney " mass with biopsies of the bladder, prostate, and the right kidney, no problem with the procedure, awaiting results. ECG shows RBBB, no problem with medications, no regular exercise but considering to start. Decline stress test, no CP nor SOB.     In 12/2016, first concern is chronic fatigue, received 42 radiation Rx, have nocturia 3X nightly which interrupt the sleep, gets only about 5 hours. Will review with upcoming visit with Urologist. Stay active, able to walk 2 blocks with can, also uses free weights 2X weekly, for 15-20 minutes. Have DORA, uses CPAP 100%. For past 2 months had 2 episode of chest pain under emotional stress, last up to 15 minutes, max grade 3/10, have not use any NTG. Chart reviewed - last nuclear stress test in 12/2014. Had JAYCEE placed in 1/2015. Last lipid is excellent, LDL 57, non-HDL 75. Weight stable at around 294 lbs.   Echo, 6/2016 CONCLUSIONS     1 - Mild left ventricular enlargement.     2 - Eccentric hypertrophy.     3 - Moderately depressed left ventricular systolic function (EF 35-40%).     4 - Normal left ventricular diastolic function.     5 - Right ventricular enlargement with normal systolic function.     6 - The estimated PA systolic pressure is greater than 25 mmHg.     7 - Mild tricuspid regurgitation.     8 - Trivial to mild aortic regurgitation.     9 - Suggestion for deterioration in LVEF from Echo in 5/2015.     10 - Difficult windows, Optison contrast used for wall motion analysis.     Carotid US  Consider repeat study in six months.    CONCLUSIONS   There is 40 - 49% right Internal Carotid stenosis.  There is 20 - 39% left Internal Carotid stenosis.    Antegrade flows in the vertebral arteries. Homogenous plaques noted in both ICAs.    In 2/2017, active with rental, feels tired all the time, using CPAP 100%, wake up feeling all right then tired after 2-3 hours. Was using hydrocodone bid for chronic pains, out of medications for 3 weeks, feels more tired off the narcotic.  Explained need for regular exercise with muscle strengthening.  Lexiscan - Nuclear Quantitative Functional Analysis:   LVEF: 42 % (normal is 47 - 59)  LVED Volume: 124 ml (normal is 91 - 155)  LVES Volume: 72 ml (normal is 40 - 78)    Impression: ABNORMAL MYOCARDIAL PERFUSION  1. There is moderate intensity fixed defects in the lateral and inferolateral walls of the left ventricle, consistent with myocardial injury. There is trivial francine-injury ischemia.   2. There is abnormal wall motion at rest showing moderate hypokinesis of the inferolateral and inferior walls of the left ventricle.   3. There is resting LV dysfunction with a reduced ejection fraction of 42 %.  (normal is 47 - 59)  4. The ventricular volumes are normal at rest and stress.   5. The extracardiac distribution of radioactivity is normal.   6. When compared to the previous study from 12/15/2014, the inferolateral defects now appears fixed.  7. [facility] SSS=15, SRS=14, and SDS=1, suggest that only 1% of myocardium may be ischemic.    Carotid US - CONCLUSIONS   There is 20 - 39% right Internal Carotid stenosis.  There is 0 - 19% left Internal Carotid stenosis.    Homogenous plaques noted in the ICAs, antegrade flows in the vertebral  arteries.    In 7/2017, here supposedly for HTN but not certain, also planning to do RF nerve ablation next Thursday with Dr. Causey, no surgical clearance requested. BP in PCP office was 112/60. Compliant with medications. Also have started using BiPAP every night for DORA, feel better. Denies any CP nor SOB. Active with property management and HA. ECG shows NSR, RBBB.     In 1/2018, no cardiac symptom, no heart worries. Very few home BP. Remain active using the cane, limited by loss of balance due to neuropathy of the LE. Fallen twice, due to the shoe, uses cane as needed. One episode, hit nose with bleeding. Labs reviewed from 9/2017: LDL 51.4, A1C 6.7%, anemia Hgb 9.8, CKD stage 3, eGFR 43   Echo in 8/2017 - CONCLUSIONS      1 - Eccentric hypertrophy.     2 - Moderately depressed left ventricular systolic function (EF 35-40%).     3 - Regional wall motion analysis consistent with ischemic disease.     4 - Impaired LV relaxation, normal LAP (grade 1 diastolic dysfunction).     5 - Trivial to mild mitral regurgitation.     6 - Right ventricular enlargement with normal systolic function.     7 - The estimated PA systolic pressure is 26 mmHg.     8 - No definite change from Echo in 6/2016.     Review of Systems   Constitution: Negative for diaphoresis, fever, some weakness, and malaise/fatigue, no night sweats and but weight stable since last visit.   HENT: Negative for headaches, nosebleeds and tinnitus.   Eyes: Negative for visual disturbance.   Cardiovascular: Positive for dyspnea on exertion and chest pain with emotional stress. Negative claudication, cyanosis, irregular heartbeat, leg swelling, near-syncope, orthopnea, palpitations and paroxysmal nocturnal dyspnia.   Respiratory: Negative for cough, shortness of breath, sleep disturbances due to breathing, snoring and wheezing. [place] score 13  Endocrine: Negative for polydipsia and polyuria.   Hematologic/Lymphatic: Does not bruise/bleed easily.   Skin: Negative for nail changes, color change, flushing, poor wound healing and suspicious lesions. ecchymosis on ASA, and warfarin  Musculoskeletal: Positive for arthritis, back pain and muscle cramps on daily Xanax 1 mg. Negative for falls, gout, joint pain, joint swelling,   Bilateral hip replacements (right 1996, left 2001), right knee arthroscopic procedure 1996.  Have muscle weakness and myalgias in hip and legs, also muscle cramps in the hands, and legs at night.  Gastrointestinal: Negative for heartburn, hematemesis, hematochezia, melena and nausea. Have no bloating  Neurological: Negative for disturbances in coordination, have excessive daytime sleepiness, rare dizziness, focal weakness in both LE, occasional light-headedness,  "have numbness, occasional loss of balance, no vertigo.   Psychiatric/Behavioral: Negative for depression and substance abuse. The patient is nervous/anxious and stressed with rentals. The patient does not have insomnia.        Objective:     Physical Exam   Constitutional: He is oriented to person, place, and time. He appears well-developed and well-nourished.   HENT:   Head: Normocephalic.   Eyes: Conjunctivae and EOM are normal. Pupils are equal, round, and reactive to light.   Neck: Normal range of motion. Neck supple. No JVD present. No thyromegaly present.   Cardiovascular: regular rate, regular rhythm, soft heart sounds and intact distal pulses. Exam reveals no gallop and no friction rub.   No murmur heard.  No swelling.   Pulmonary/Chest: Effort normal. He has no wheezes. He has no rales. He exhibits no tenderness.   Abdominal: Soft. Bowel sounds are normal. There is no tenderness.  Protuberance, waist circumference 57.5 inches increased to 58" , hip 55 inches.   Musculoskeletal: Normal range of motion. He exhibits No tenderness (mild behind the left knee.). He exhibits 1+ edema in left lower extremities to the knee.   Lymphadenopathy:   He has no cervical adenopathy.   Neurological: He is alert and oriented to person, place, and time.   Skin: Skin is warm and dry. No rash noted.   Venous stasis, bilateral with stasis dermatitis        Assessment:        Plan:      Richard was seen today for cardiac follow up    Diagnoses and associated orders for this visit:    Hypertension associated with diabetes    Coronary artery disease involving native coronary artery of native heart without angina pectoris    Carotid disease, bilateral    Thoracic aortic aneurysm without rupture    Paroxysmal atrial fibrillation, new onset, 11/2014    Pure hypercholesterolemia    Hypercoagulable state, Prothrombin mutation    Morbid obesity, today BMI 44.6    CKD (chronic kidney disease) stage 3, GFR 30-59 ml/min, 41    Abnormal " cardiovascular stress test    LV dysfunction, EF 40%    Long term (current) use of anticoagulants    Type 2 diabetes mellitus with stage 3 chronic kidney disease, without long-term current use of insulin    Anemia of chronic renal failure, stage 3 (moderate)    Obstructive sleep apnea syndrome      - CV status stable, continue current Rx, all medications reviewed, patient acknowledge good understanding.  - Check home blood pressure, 2 days weekly, do 2 readings within 5 minutes in AM and PM, keep log for review.  - Weigh twice weekly, try to lose 1-2 lbs per week  - Recommend at least biannual cardiovascular evaluation in view of his significant risk factors. Patient's preference  - Phone review / [facility]    Venous stasis with ulcers - stable    Hypercoagulable state, Prothrombin mutation    Sedentary lifestyle - improved    Patient Active Problem List   Diagnosis    Diabetes mellitus with chronic kidney disease, stage III    MTHFR mutation (methylenetetrahydrofolate reductase)    Sleep apnea, using BiPAP nightly, 1999, last sleep test in 2013    Hypertension associated with diabetes    CAD (coronary artery disease), NSTEMI, JAYCEE x2, LAD & OM1, 8/18/2010, JAYCEE to RCA , 1/15/2015    Hyperlipidemia, baseline     Gout    GERD (gastroesophageal reflux disease)    Hypercoagulable state, Prothrombin mutation    Colon polyps    Anxiety    Chronic back pain    Morbid obesity, today BMI 44.6    Carotid disease, bilateral    Aortic aneurysm, thoracic, 4.3 cm, 8/2010    GARY (generalized anxiety disorder)    CKD (chronic kidney disease) stage 3, GFR 30-59 ml/min, 41    Abnormal cardiovascular stress test    LV dysfunction, EF 40%    Long term (current) use of anticoagulants    OA (osteoarthritis). post bilateral THR, 2001    Diabetic neuropathy    H/O bariatric surgery, 2008    Osteoarthritis, knee    BPH with obstruction/lower urinary tract symptoms    Proteinuria    Paroxysmal atrial  fibrillation, new onset, 11/2014    Left ventricular dysfunction with reduced left ventricular function    Stasis dermatitis of both legs    Type 2 diabetes mellitus with neurologic complication    Type 2 diabetes mellitus with diabetic nephropathy    Hypertensive left ventricular hypertrophy, without heart failure    Prostate cancer    Nocturia more than twice per night    Sleep deprivation    Chronic fatigue    Ulcer of toe of left foot    Peripheral vascular disease    Osteomyelitis of ankle or foot    Prostate cancer    Facet arthropathy    Clotting disorder    Anemia of other chronic disease    Anemia, chronic renal failure    Anemia, unspecified     Total face-to-face time with the patient was 40 minutes and greater than 50% was spent in counseling and coordination of care. The above assessment and plan have been discussed at length. Labs and procedure over the last 6 months reviewed. Problem List updated. Asked to bring in all active medications / pills bottles with next visit.

## 2018-01-30 ENCOUNTER — TELEPHONE (OUTPATIENT)
Dept: DERMATOLOGY | Facility: CLINIC | Age: 73
End: 2018-01-30

## 2018-01-30 RX ORDER — IMIQUIMOD 12.5 MG/.25G
CREAM TOPICAL
Qty: 24 PACKET | Refills: 0 | OUTPATIENT
Start: 2018-01-30 | End: 2018-03-30

## 2018-02-01 DIAGNOSIS — I25.10 CORONARY ARTERY DISEASE INVOLVING NATIVE CORONARY ARTERY OF NATIVE HEART WITHOUT ANGINA PECTORIS: Chronic | ICD-10-CM

## 2018-02-02 RX ORDER — FUROSEMIDE 40 MG/1
TABLET ORAL
Qty: 90 TABLET | Refills: 3 | Status: SHIPPED | OUTPATIENT
Start: 2018-02-02 | End: 2018-03-09 | Stop reason: SDUPTHER

## 2018-02-07 NOTE — PROGRESS NOTES
Spoke with lisa informed her new orders were sent and pt needs to go and have level done she voiced understanding

## 2018-02-14 ENCOUNTER — OFFICE VISIT (OUTPATIENT)
Dept: PODIATRY | Facility: CLINIC | Age: 73
End: 2018-02-14
Payer: MEDICARE

## 2018-02-14 VITALS — BODY MASS INDEX: 43.4 KG/M2 | HEIGHT: 70 IN | WEIGHT: 303.13 LBS

## 2018-02-14 DIAGNOSIS — E11.42 DIABETIC POLYNEUROPATHY ASSOCIATED WITH TYPE 2 DIABETES MELLITUS: Primary | ICD-10-CM

## 2018-02-14 DIAGNOSIS — I73.9 PERIPHERAL VASCULAR DISEASE: ICD-10-CM

## 2018-02-14 DIAGNOSIS — L97.519 ULCERATED, FOOT, RIGHT, WITH UNSPECIFIED SEVERITY: ICD-10-CM

## 2018-02-14 DIAGNOSIS — B35.1 ONYCHOMYCOSIS DUE TO DERMATOPHYTE: ICD-10-CM

## 2018-02-14 DIAGNOSIS — M20.42 HAMMER TOES OF BOTH FEET: ICD-10-CM

## 2018-02-14 DIAGNOSIS — Z87.898 HISTORY OF ULCERATION: ICD-10-CM

## 2018-02-14 DIAGNOSIS — M20.41 HAMMER TOES OF BOTH FEET: ICD-10-CM

## 2018-02-14 PROCEDURE — 1126F AMNT PAIN NOTED NONE PRSNT: CPT | Mod: S$GLB,,, | Performed by: PODIATRIST

## 2018-02-14 PROCEDURE — 99213 OFFICE O/P EST LOW 20 MIN: CPT | Mod: 25,S$GLB,, | Performed by: PODIATRIST

## 2018-02-14 PROCEDURE — 11721 DEBRIDE NAIL 6 OR MORE: CPT | Mod: Q9,59,S$GLB, | Performed by: PODIATRIST

## 2018-02-14 PROCEDURE — 11042 DBRDMT SUBQ TIS 1ST 20SQCM/<: CPT | Mod: S$GLB,,, | Performed by: PODIATRIST

## 2018-02-14 PROCEDURE — 99999 PR PBB SHADOW E&M-EST. PATIENT-LVL III: CPT | Mod: PBBFAC,,, | Performed by: PODIATRIST

## 2018-02-14 PROCEDURE — 1159F MED LIST DOCD IN RCRD: CPT | Mod: S$GLB,,, | Performed by: PODIATRIST

## 2018-02-14 PROCEDURE — 3008F BODY MASS INDEX DOCD: CPT | Mod: S$GLB,,, | Performed by: PODIATRIST

## 2018-02-14 RX ORDER — IMIQUIMOD 12.5 MG/.25G
CREAM TOPICAL
COMMUNITY
Start: 2018-01-30 | End: 2018-03-27

## 2018-02-14 NOTE — PROGRESS NOTES
Subjective:      Patient ID: Richard Bustos is a 72 y.o. male.    Chief Complaint: Foot Ulcer (right foot)    Richard is a 72 y.o. male who presents to the clinic for evaluation and treatment of high risk feet. Richard has a past medical history of Anemia; Anemia of other chronic disease (9/13/2017); Anemia, chronic renal failure (9/13/2017); Anemia, unspecified (9/13/2017); Anticoagulant long-term use; Aorta aneurysm; Arthritis; Atrial fibrillation; CAD (coronary artery disease); CHF (congestive heart failure); Chronic kidney disease; Clotting disorder (9/13/2017); Colon polyp; Diabetes mellitus; Diabetes mellitus type II; Diverticulosis; Elevated PSA; Encounter for blood transfusion; Former smoker; GERD (gastroesophageal reflux disease); Gout; colonic polyps; Hypercoagulable state; Hyperlipidemia; Hypertension; MI, old (2010); Myocardial infarction; Prostate cancer (06/2016); Sleep apnea; and Venous stasis. The patient's chief complaint is diabetic foot ulcer, bottom right forefoot.  Dressed in football CDI.  Ambulating minimally in sx shoe right, DM shoe insert left.  Due for new ones after first of year. This patient has documented high risk feet requiring routine maintenance secondary to diabetes mellitis and those secondary complications of diabetes, as mentioned..  Dressing CDI changed at home following getting wet in the shower.  Denies signs infection today.  Did not get xray last visit as requested.      PCP: Familia Ramirez Jr, MD    Date Last Seen by PCP:   Chief Complaint   Patient presents with    Foot Ulcer     right foot         Current shoe gear:  Affected Foot: sx shoe     Unaffected Foot: Rx diabetic extra depth shoes and custom accommodative insoles    History of Trauma: negative  Sign of Infection: none    Hemoglobin A1C   Date Value Ref Range Status   09/19/2017 6.7 (H) 4.0 - 5.6 % Final     Comment:     According to ADA guidelines, hemoglobin A1c <7.0% represents  optimal control in non-pregnant  diabetic patients. Different  metrics may apply to specific patient populations.   Standards of Medical Care in Diabetes-2016.  For the purpose of screening for the presence of diabetes:  <5.7%     Consistent with the absence of diabetes  5.7-6.4%  Consistent with increasing risk for diabetes   (prediabetes)  >or=6.5%  Consistent with diabetes  Currently, no consensus exists for use of hemoglobin A1c  for diagnosis of diabetes for children.  This Hemoglobin A1c assay has significant interference with fetal   hemoglobin   (HbF). The results are invalid for patients with abnormal amounts of   HbF,   including those with known Hereditary Persistence   of Fetal Hemoglobin. Heterozygous hemoglobin variants (HbAS, HbAC,   HbAD, HbAE, HbA2) do not significantly interfere with this assay;   however, presence of multiple variants in a sample may impact the %   interference.     12/15/2014 4.7 4.5 - 6.2 % Final   06/17/2013 6.8 (H) 4.8 - 5.6 % Final     Comment:              Increased risk for diabetes: 5.7 - 6.4           Diabetes: >6.4           Glycemic control for adults with diabetes: <7.0  **In order to standardize %HbA1c results worldwide, as of October 11, 2010,  the %HbA1c is being calculated using the master equation recommended in the  consensus statement adopted by the ADA (American Diabetes Assoc), EASD  (European Assoc for the Study of Diabetes), IFCC (International Federation  of Clinical Chemistry and Laboratory Medicine) and IDF (International  Diabetes Federation). Result units: %HgbA1c (DCCT/NGSP).  In common with other methods, Hb A1C values may not accurately reflect mean  blood glucose in patients with hemoglobin variants (HgbF, HgbS and HgbC).  Any cause of shortened erythrocyte survival will reduce exposure of  erythrocytes to glucose with a consequent decrease in HbA1c (%) values, even  though the time-averaged blood glucose level may be elevated. Causes of  shortened erythrocyte lifetime might be  hemolytic anemia or other hemolytic  diseases, homozygous sickle cell trait, pregnancy, recent significant or  chronic blood loss, etc. Caution should be used when interpreting the HbA1c  results from patients with these conditions.   07/05/2012 5.7 4.0 - 6.2 % Final       Review of Systems   Constitution: Negative for chills, fever, malaise/fatigue and night sweats.   Cardiovascular: Negative for claudication, cyanosis and leg swelling.   Skin: Positive for nail changes and poor wound healing. Negative for color change, itching, rash and suspicious lesions.   Musculoskeletal: Negative for falls, gout, joint pain and myalgias.   Neurological: Positive for numbness, paresthesias and sensory change.           Objective:      Physical Exam   Constitutional: He appears well-developed and well-nourished. He is cooperative. No distress.   Cardiovascular:   Pulses:       Dorsalis pedis pulses are 1+ on the right side, and 1+ on the left side.        Posterior tibial pulses are 1+ on the right side, and 1+ on the left side.   Toes warm, pink, cap fill <5 seconds.   Musculoskeletal:   Hallux valgus and flexor toe deformities both feet without evidence of injury or loss of function.     All ten toes without clubbing, cyanosis, or signs of ischemia.  No pain to palpation bilateral lower extremities.  Range of motion, stability, muscle strength, and muscle tone normal bilateral feet and legs.     Lymphadenopathy:   Negative lymphadenopathy bilateral popliteal fossa and tarsal tunnel.  Negative streaking both feet.   Neurological: He has normal strength. He displays no tremor. A sensory deficit is present. He exhibits normal muscle tone.   Diminished/loss of protective sensation all toes bilateral to 10 gram monofilament.    Paresthesias, and burning bilateral feet with no clearly identified trigger or source.    Diminished/loss of protective sensation all toes bilateral to 10 gram monofilament.     Skin: Skin is warm and dry.  No abrasion, no bruising, no burn, no ecchymosis, no laceration, no lesion and no rash noted. He is not diaphoretic. No erythema. No pallor.   Wound:  Plantar right 1st mtpj    Size:    Pre:[id number]  Callus with pin hole  Post: [id number]    Base:  Granular, pink with moderate serous/serosanaguinous drainage only.  No pus, tracking, fluctuance, malodor, or cardinal signs infection.    Borders:  Hyperkeratotic, debriding to flat, pink, blanchable skin edges without undermining.    Toenails 1st, 2nd, 3rd, 4th, 5th  bilateral are hypertrophic thickened 2-3 mm, dystrophic, discolored tanish brown with tan, gray crumbly subungual debris.  Tender to distal nail plate pressure, without periungual skin abnormality of each.      Otherwise, Skin thin, shiny, atrophic, with decreased density and distribution of pedal hair bilateral, but without hyperpigmentation, minerva discoloration,  ulcers, masses, nodules or cords palpated bilateral feet and legs.                 Assessment:       Encounter Diagnoses   Name Primary?    Diabetic polyneuropathy associated with type 2 diabetes mellitus Yes    Peripheral vascular disease     Hammer toes of both feet     History of ulceration     Ulcerated, foot, right, with unspecified severity     Onychomycosis due to dermatophyte          Plan:       Richard was seen today for foot ulcer.    Diagnoses and all orders for this visit:    Diabetic polyneuropathy associated with type 2 diabetes mellitus  -     DIABETIC SHOES FOR HOME USE    Peripheral vascular disease    Hammer toes of both feet  -     DIABETIC SHOES FOR HOME USE    History of ulceration  -     DIABETIC SHOES FOR HOME USE    Ulcerated, foot, right, with unspecified severity  -     DIABETIC SHOES FOR HOME USE    Onychomycosis due to dermatophyte         I counseled the patient on his conditions, their implications and medical management.    Debride right wound.    With the patient's permission, I debrided all ten toenails with a  sterile nipper and curette, removing all offending nail and debris.  Patient tolerated the procedure well and related significant relief.      The patient has received literature on basic diabetic foot care.  Patient will inspect feet daily, wear protective shoe gear when ambulatory, and apply moisturizer to skin as needed to maintain elasticity and help prevent ulceration.      Dressed right foot wound with aperture, wound foam, kerlix  - change same every other day.     sx shoe right - ambulate minimally same with DM shoe/insert left.    Rx new DM shoes, inserts custom, continue penlac.    Adequate vitamin supplementation, protein intake, and hydration - discussed with patient.    Xray right foot.          Follow-up in about 1 week (around 2/21/2018).

## 2018-02-14 NOTE — PROCEDURES
"Wound Debridement  Date/Time: 2/14/2018 9:10 AM  Performed by: ALBINO LARSON  Authorized by: ALBINO LARSON     Time out: Immediately prior to procedure a "time out" was called to verify the correct patient, procedure, equipment, support staff and site/side marked as required.    Consent Done?:  Yes (Verbal)  Local anesthesia used?: No      Wound Details:    Location:  Right foot    Location:  Right 1st Metatarsal Head    Type of Debridement:  Excisional       Length (cm):  0.5       Area (sq cm):  0.2       Width (cm):  0.4       Percent Debrided (%):  100       Depth (cm):  0.2       Total Area Debrided (sq cm):  0.2    Depth of debridement:  Subcutaneous tissue    Tissue debrided:  Dermis, Epidermis and Subcutaneous    Devitalized tissue debrided:  Callus and Sough    Instruments:  Blade    Bleeding:  Minimal  Hemostasis Achieved: Yes    Method Used:  Pressure  Patient tolerance:  Patient tolerated the procedure well with no immediate complications      "

## 2018-02-15 DIAGNOSIS — F41.9 ANXIETY: ICD-10-CM

## 2018-02-15 DIAGNOSIS — M54.5 CHRONIC LOW BACK PAIN, UNSPECIFIED BACK PAIN LATERALITY, WITH SCIATICA PRESENCE UNSPECIFIED: ICD-10-CM

## 2018-02-15 DIAGNOSIS — G89.29 CHRONIC LOW BACK PAIN, UNSPECIFIED BACK PAIN LATERALITY, WITH SCIATICA PRESENCE UNSPECIFIED: ICD-10-CM

## 2018-02-15 RX ORDER — ALPRAZOLAM 1 MG/1
TABLET ORAL
Qty: 30 TABLET | Refills: 0 | Status: SHIPPED | OUTPATIENT
Start: 2018-02-15 | End: 2018-03-13 | Stop reason: SDUPTHER

## 2018-02-15 RX ORDER — HYDROCODONE BITARTRATE AND ACETAMINOPHEN 7.5; 325 MG/1; MG/1
1 TABLET ORAL EVERY 6 HOURS PRN
Qty: 90 TABLET | Refills: 0 | Status: SHIPPED | OUTPATIENT
Start: 2018-02-15 | End: 2018-03-13 | Stop reason: SDUPTHER

## 2018-02-15 NOTE — TELEPHONE ENCOUNTER
----- Message from Sheyla Chavez sent at 2/15/2018 12:50 PM CST -----  Contact: daughter  Daughter - Citlali Frye - [phone] is requesting to have patient's Norco and Xanax sent to pharmacy/please advise       [facility] [id number] [address] AT [facility] [place] [address] [place] [address]  Phone: [phone] Fax: [phone]

## 2018-02-21 ENCOUNTER — HOSPITAL ENCOUNTER (OUTPATIENT)
Dept: RADIOLOGY | Facility: HOSPITAL | Age: 73
Discharge: HOME | End: 2018-02-21
Attending: INTERNAL MEDICINE
Payer: MEDICARE

## 2018-02-21 ENCOUNTER — OFFICE VISIT (OUTPATIENT)
Dept: PODIATRY | Facility: CLINIC | Age: 73
End: 2018-02-21
Payer: MEDICARE

## 2018-02-21 VITALS — BODY MASS INDEX: 43.4 KG/M2 | WEIGHT: 303.13 LBS | HEIGHT: 70 IN

## 2018-02-21 DIAGNOSIS — E11.42 DIABETIC POLYNEUROPATHY ASSOCIATED WITH TYPE 2 DIABETES MELLITUS: ICD-10-CM

## 2018-02-21 DIAGNOSIS — I77.9 CAROTID DISEASE, BILATERAL: Chronic | ICD-10-CM

## 2018-02-21 DIAGNOSIS — I71.20 THORACIC AORTIC ANEURYSM WITHOUT RUPTURE: Chronic | ICD-10-CM

## 2018-02-21 DIAGNOSIS — L97.512 ULCERATED, FOOT, RIGHT, WITH FAT LAYER EXPOSED: Primary | ICD-10-CM

## 2018-02-21 DIAGNOSIS — I73.9 PERIPHERAL VASCULAR DISEASE: ICD-10-CM

## 2018-02-21 LAB — INTERNAL CAROTID STENOSIS: NORMAL

## 2018-02-21 PROCEDURE — 99999 PR PBB SHADOW E&M-EST. PATIENT-LVL III: CPT | Mod: PBBFAC,,, | Performed by: PODIATRIST

## 2018-02-21 PROCEDURE — 11042 DBRDMT SUBQ TIS 1ST 20SQCM/<: CPT | Mod: S$GLB,,, | Performed by: PODIATRIST

## 2018-02-21 PROCEDURE — 93880 EXTRACRANIAL BILAT STUDY: CPT | Mod: 26,,, | Performed by: INTERNAL MEDICINE

## 2018-02-21 PROCEDURE — 93880 EXTRACRANIAL BILAT STUDY: CPT | Mod: TC

## 2018-02-21 PROCEDURE — 99499 UNLISTED E&M SERVICE: CPT | Mod: S$GLB,,, | Performed by: PODIATRIST

## 2018-02-21 NOTE — PROCEDURES
"Wound Debridement  Date/Time: 2/21/2018 11:00 AM  Performed by: ALBINO LARSON  Authorized by: ALBINO LARSON     Time out: Immediately prior to procedure a "time out" was called to verify the correct patient, procedure, equipment, support staff and site/side marked as required.    Consent Done?:  Yes (Verbal)  Local anesthesia used?: No      Wound Details:    Location:  Right foot    Location:  Right 1st Metatarsal Head    Type of Debridement:  Excisional       Length (cm):  0.5       Area (sq cm):  0.25       Width (cm):  0.5       Percent Debrided (%):  100       Depth (cm):  0.3       Total Area Debrided (sq cm):  0.25    Depth of debridement:  Subcutaneous tissue    Tissue debrided:  Dermis, Epidermis and Subcutaneous    Devitalized tissue debrided:  Biofilm and Callus    Instruments:  Blade    Bleeding:  Minimal  Hemostasis Achieved: Yes    Method Used:  Pressure  Patient tolerance:  Patient tolerated the procedure well with no immediate complications      "

## 2018-02-21 NOTE — PROGRESS NOTES
Per [employer] website pt still has not gone to have level done, swati C ptrs daughter to inform her that pt needs to go and have level done. Missed appt letter sent to pt today

## 2018-02-21 NOTE — PROGRESS NOTES
Subjective:      Patient ID: Richard Bustos is a 72 y.o. male.    Chief Complaint: Foot Ulcer (right foot)    Richard is a 72 y.o. male who presents to the clinic for evaluation and treatment of high risk feet. Richard has a past medical history of Anemia; Anemia of other chronic disease (9/13/2017); Anemia, chronic renal failure (9/13/2017); Anemia, unspecified (9/13/2017); Anticoagulant long-term use; Aorta aneurysm; Arthritis; Atrial fibrillation; CAD (coronary artery disease); CHF (congestive heart failure); Chronic kidney disease; Clotting disorder (9/13/2017); Colon polyp; Diabetes mellitus; Diabetes mellitus type II; Diverticulosis; Elevated PSA; Encounter for blood transfusion; Former smoker; GERD (gastroesophageal reflux disease); Gout; colonic polyps; Hypercoagulable state; Hyperlipidemia; Hypertension; MI, old (2010); Myocardial infarction; Prostate cancer (06/2016); Sleep apnea; and Venous stasis. The patient's chief complaint is diabetic foot ulcer, bottom right forefoot.  Dressed in football CDI.  Ambulating minimally in sx shoe right, DM shoe insert left.  Due for new ones after first of year. This patient has documented high risk feet requiring routine maintenance secondary to diabetes mellitis and those secondary complications of diabetes, as mentioned..  Dressing CDI changed at home following getting wet in the shower.  Denies signs infection today.  Did not get xray last visit as requested twice.      PCP: Familia Ramirez Jr, MD    Date Last Seen by PCP:   Chief Complaint   Patient presents with    Foot Ulcer     right foot         Current shoe gear:  Affected Foot: sx shoe     Unaffected Foot: Rx diabetic extra depth shoes and custom accommodative insoles    History of Trauma: negative  Sign of Infection: none    Hemoglobin A1C   Date Value Ref Range Status   09/19/2017 6.7 (H) 4.0 - 5.6 % Final     Comment:     According to ADA guidelines, hemoglobin A1c <7.0% represents  optimal control in  non-pregnant diabetic patients. Different  metrics may apply to specific patient populations.   Standards of Medical Care in Diabetes-2016.  For the purpose of screening for the presence of diabetes:  <5.7%     Consistent with the absence of diabetes  5.7-6.4%  Consistent with increasing risk for diabetes   (prediabetes)  >or=6.5%  Consistent with diabetes  Currently, no consensus exists for use of hemoglobin A1c  for diagnosis of diabetes for children.  This Hemoglobin A1c assay has significant interference with fetal   hemoglobin   (HbF). The results are invalid for patients with abnormal amounts of   HbF,   including those with known Hereditary Persistence   of Fetal Hemoglobin. Heterozygous hemoglobin variants (HbAS, HbAC,   HbAD, HbAE, HbA2) do not significantly interfere with this assay;   however, presence of multiple variants in a sample may impact the %   interference.     12/15/2014 4.7 4.5 - 6.2 % Final   06/17/2013 6.8 (H) 4.8 - 5.6 % Final     Comment:              Increased risk for diabetes: 5.7 - 6.4           Diabetes: >6.4           Glycemic control for adults with diabetes: <7.0  **In order to standardize %HbA1c results worldwide, as of October 11, 2010,  the %HbA1c is being calculated using the master equation recommended in the  consensus statement adopted by the ADA (American Diabetes Assoc), EASD  (European Assoc for the Study of Diabetes), IFCC (International Federation  of Clinical Chemistry and Laboratory Medicine) and IDF (International  Diabetes Federation). Result units: %HgbA1c (DCCT/NGSP).  In common with other methods, Hb A1C values may not accurately reflect mean  blood glucose in patients with hemoglobin variants (HgbF, HgbS and HgbC).  Any cause of shortened erythrocyte survival will reduce exposure of  erythrocytes to glucose with a consequent decrease in HbA1c (%) values, even  though the time-averaged blood glucose level may be elevated. Causes of  shortened erythrocyte lifetime  might be hemolytic anemia or other hemolytic  diseases, homozygous sickle cell trait, pregnancy, recent significant or  chronic blood loss, etc. Caution should be used when interpreting the HbA1c  results from patients with these conditions.   07/05/2012 5.7 4.0 - 6.2 % Final       Review of Systems   Constitution: Negative for chills, fever, malaise/fatigue and night sweats.   Cardiovascular: Negative for claudication, cyanosis and leg swelling.   Skin: Positive for nail changes and poor wound healing. Negative for color change, itching, rash and suspicious lesions.   Musculoskeletal: Negative for falls, gout, joint pain and myalgias.   Neurological: Positive for numbness, paresthesias and sensory change.           Objective:      Physical Exam   Constitutional: He appears well-developed and well-nourished. He is cooperative. No distress.   Cardiovascular:   Pulses:       Dorsalis pedis pulses are 1+ on the right side, and 1+ on the left side.        Posterior tibial pulses are 1+ on the right side, and 1+ on the left side.   Toes warm, pink, cap fill <5 seconds.   Musculoskeletal:   Hallux valgus and flexor toe deformities both feet without evidence of injury or loss of function.     All ten toes without clubbing, cyanosis, or signs of ischemia.  No pain to palpation bilateral lower extremities.  Range of motion, stability, muscle strength, and muscle tone normal bilateral feet and legs.     Lymphadenopathy:   Negative lymphadenopathy bilateral popliteal fossa and tarsal tunnel.  Negative streaking both feet.   Neurological: He has normal strength. He displays no tremor. A sensory deficit is present. He exhibits normal muscle tone.   Diminished/loss of protective sensation all toes bilateral to 10 gram monofilament.    Paresthesias, and burning bilateral feet with no clearly identified trigger or source.    Diminished/loss of protective sensation all toes bilateral to 10 gram monofilament.     Skin: Skin is warm  and dry. No abrasion, no bruising, no burn, no ecchymosis, no laceration, no lesion and no rash noted. He is not diaphoretic. No erythema. No pallor.   Wound:  Plantar right 1st mtpj    Size:    Pre:[id number]    Post: [id number]    Base:  Granular, pink with moderate serous/serosanaguinous drainage only.  No pus, tracking, fluctuance, malodor, or cardinal signs infection.    Borders:  Hyperkeratotic, debriding to flat, pink, blanchable skin edges without undermining.        Otherwise, Skin thin, shiny, atrophic, with decreased density and distribution of pedal hair bilateral, but without hyperpigmentation, minerva discoloration,  ulcers, masses, nodules or cords palpated bilateral feet and legs.                 Assessment:       Encounter Diagnoses   Name Primary?    Ulcerated, foot, right, with fat layer exposed Yes    Diabetic polyneuropathy associated with type 2 diabetes mellitus     Peripheral vascular disease          Plan:       Richard was seen today for foot ulcer.    Diagnoses and all orders for this visit:    Ulcerated, foot, right, with fat layer exposed    Diabetic polyneuropathy associated with type 2 diabetes mellitus    Peripheral vascular disease         I counseled the patient on his conditions, their implications and medical management.    Debride right wound.    Dressed right foot wound with nicole, aperture, wound foam, kerlix  - change same every other day.     sx shoe right - ambulate minimally same with DM shoe/insert left.    Fill Rx new DM shoes, inserts custom, continue penlac.    Adequate vitamin supplementation, protein intake, and hydration - discussed with patient.              Follow-up in about 1 week (around 2/28/2018).

## 2018-02-28 ENCOUNTER — OFFICE VISIT (OUTPATIENT)
Dept: PODIATRY | Facility: CLINIC | Age: 73
End: 2018-02-28
Payer: MEDICARE

## 2018-02-28 VITALS — HEIGHT: 69 IN | BODY MASS INDEX: 44.47 KG/M2 | WEIGHT: 300.25 LBS

## 2018-02-28 DIAGNOSIS — L97.512 ULCERATED, FOOT, RIGHT, WITH FAT LAYER EXPOSED: Primary | ICD-10-CM

## 2018-02-28 DIAGNOSIS — I73.9 PERIPHERAL VASCULAR DISEASE: ICD-10-CM

## 2018-02-28 DIAGNOSIS — E11.42 DIABETIC POLYNEUROPATHY ASSOCIATED WITH TYPE 2 DIABETES MELLITUS: ICD-10-CM

## 2018-02-28 PROCEDURE — 99499 UNLISTED E&M SERVICE: CPT | Mod: S$GLB,,, | Performed by: PODIATRIST

## 2018-02-28 PROCEDURE — 99999 PR PBB SHADOW E&M-EST. PATIENT-LVL III: CPT | Mod: PBBFAC,,, | Performed by: PODIATRIST

## 2018-02-28 PROCEDURE — 11042 DBRDMT SUBQ TIS 1ST 20SQCM/<: CPT | Mod: S$GLB,,, | Performed by: PODIATRIST

## 2018-02-28 NOTE — PROCEDURES
"Wound Debridement  Date/Time: 2/28/2018 11:16 AM  Performed by: ALBINO LARSON  Authorized by: ALBINO LARSON     Time out: Immediately prior to procedure a "time out" was called to verify the correct patient, procedure, equipment, support staff and site/side marked as required.    Consent Done?:  Yes (Verbal)  Local anesthesia used?: No      Wound Details:    Location:  Right foot    Location:  Right 1st Metatarsal Head    Type of Debridement:  Excisional       Length (cm):  0.5       Area (sq cm):  0.25       Width (cm):  0.5       Percent Debrided (%):  100       Depth (cm):  0.2       Total Area Debrided (sq cm):  0.25    Depth of debridement:  Subcutaneous tissue    Tissue debrided:  Dermis, Epidermis and Subcutaneous    Devitalized tissue debrided:  Biofilm and Callus    Instruments:  Blade    Bleeding:  Minimal  Hemostasis Achieved: Yes    Method Used:  Pressure  Patient tolerance:  Patient tolerated the procedure well with no immediate complications      "

## 2018-02-28 NOTE — PROGRESS NOTES
Subjective:      Patient ID: Richard Bustos is a 72 y.o. male.    Chief Complaint: Foot Ulcer (right foot )    Richard is a 72 y.o. male who presents to the clinic for evaluation and treatment of high risk feet. Richard has a past medical history of Anemia; Anemia of other chronic disease (9/13/2017); Anemia, chronic renal failure (9/13/2017); Anemia, unspecified (9/13/2017); Anticoagulant long-term use; Aorta aneurysm; Arthritis; Atrial fibrillation; CAD (coronary artery disease); CHF (congestive heart failure); Chronic kidney disease; Clotting disorder (9/13/2017); Colon polyp; Diabetes mellitus; Diabetes mellitus type II; Diverticulosis; Elevated PSA; Encounter for blood transfusion; Former smoker; GERD (gastroesophageal reflux disease); Gout; colonic polyps; Hypercoagulable state; Hyperlipidemia; Hypertension; MI, old (2010); Myocardial infarction; Prostate cancer (06/2016); Sleep apnea; and Venous stasis. The patient's chief complaint is diabetic foot ulcer, bottom right forefoot.  Dressed in football CDI.  Ambulating minimally in sx shoe right, DM shoe insert left.  Due for new ones after first of year. This patient has documented high risk feet requiring routine maintenance secondary to diabetes mellitis and those secondary complications of diabetes, as mentioned..  Dressing CDI changed at home following getting wet in the shower.  Denies signs infection today.  Did not get xray last visit as requested twice.      PCP: Familia Ramirez Jr, MD    Date Last Seen by PCP:   Chief Complaint   Patient presents with    Foot Ulcer     right foot          Current shoe gear:  Affected Foot: sx shoe     Unaffected Foot: Rx diabetic extra depth shoes and custom accommodative insoles    History of Trauma: negative  Sign of Infection: none    Hemoglobin A1C   Date Value Ref Range Status   09/19/2017 6.7 (H) 4.0 - 5.6 % Final     Comment:     According to ADA guidelines, hemoglobin A1c <7.0% represents  optimal control in  non-pregnant diabetic patients. Different  metrics may apply to specific patient populations.   Standards of Medical Care in Diabetes-2016.  For the purpose of screening for the presence of diabetes:  <5.7%     Consistent with the absence of diabetes  5.7-6.4%  Consistent with increasing risk for diabetes   (prediabetes)  >or=6.5%  Consistent with diabetes  Currently, no consensus exists for use of hemoglobin A1c  for diagnosis of diabetes for children.  This Hemoglobin A1c assay has significant interference with fetal   hemoglobin   (HbF). The results are invalid for patients with abnormal amounts of   HbF,   including those with known Hereditary Persistence   of Fetal Hemoglobin. Heterozygous hemoglobin variants (HbAS, HbAC,   HbAD, HbAE, HbA2) do not significantly interfere with this assay;   however, presence of multiple variants in a sample may impact the %   interference.     12/15/2014 4.7 4.5 - 6.2 % Final   06/17/2013 6.8 (H) 4.8 - 5.6 % Final     Comment:              Increased risk for diabetes: 5.7 - 6.4           Diabetes: >6.4           Glycemic control for adults with diabetes: <7.0  **In order to standardize %HbA1c results worldwide, as of October 11, 2010,  the %HbA1c is being calculated using the master equation recommended in the  consensus statement adopted by the ADA (American Diabetes Assoc), EASD  (European Assoc for the Study of Diabetes), IFCC (International Federation  of Clinical Chemistry and Laboratory Medicine) and IDF (International  Diabetes Federation). Result units: %HgbA1c (DCCT/NGSP).  In common with other methods, Hb A1C values may not accurately reflect mean  blood glucose in patients with hemoglobin variants (HgbF, HgbS and HgbC).  Any cause of shortened erythrocyte survival will reduce exposure of  erythrocytes to glucose with a consequent decrease in HbA1c (%) values, even  though the time-averaged blood glucose level may be elevated. Causes of  shortened erythrocyte lifetime  might be hemolytic anemia or other hemolytic  diseases, homozygous sickle cell trait, pregnancy, recent significant or  chronic blood loss, etc. Caution should be used when interpreting the HbA1c  results from patients with these conditions.   07/05/2012 5.7 4.0 - 6.2 % Final       Review of Systems   Constitution: Negative for chills, fever, malaise/fatigue and night sweats.   Cardiovascular: Negative for claudication, cyanosis and leg swelling.   Skin: Positive for nail changes and poor wound healing. Negative for color change, itching, rash and suspicious lesions.   Musculoskeletal: Negative for falls, gout, joint pain and myalgias.   Neurological: Positive for numbness, paresthesias and sensory change.           Objective:      Physical Exam   Constitutional: He appears well-developed and well-nourished. He is cooperative. No distress.   Cardiovascular:   Pulses:       Dorsalis pedis pulses are 1+ on the right side, and 1+ on the left side.        Posterior tibial pulses are 1+ on the right side, and 1+ on the left side.   Toes warm, pink, cap fill <5 seconds.   Musculoskeletal:   Hallux valgus and flexor toe deformities both feet without evidence of injury or loss of function.     All ten toes without clubbing, cyanosis, or signs of ischemia.  No pain to palpation bilateral lower extremities.  Range of motion, stability, muscle strength, and muscle tone normal bilateral feet and legs.     Lymphadenopathy:   Negative lymphadenopathy bilateral popliteal fossa and tarsal tunnel.  Negative streaking both feet.   Neurological: He has normal strength. He displays no tremor. A sensory deficit is present. He exhibits normal muscle tone.   Diminished/loss of protective sensation all toes bilateral to 10 gram monofilament.    Paresthesias, and burning bilateral feet with no clearly identified trigger or source.    Diminished/loss of protective sensation all toes bilateral to 10 gram monofilament.     Skin: Skin is warm  and dry. No abrasion, no bruising, no burn, no ecchymosis, no laceration, no lesion and no rash noted. He is not diaphoretic. No erythema. No pallor.   Wound:  Plantar right 1st mtpj    Size:    Pre:[id number]    Post: [id number]    Base:  Granular, pink with moderate serous/serosanaguinous drainage only.  No pus, tracking, fluctuance, malodor, or cardinal signs infection.    Borders:  Hyperkeratotic, debriding to flat, pink, blanchable skin edges without undermining.        Otherwise, Skin thin, shiny, atrophic, with decreased density and distribution of pedal hair bilateral, but without hyperpigmentation, minerva discoloration,  ulcers, masses, nodules or cords palpated bilateral feet and legs.                 Assessment:       Encounter Diagnoses   Name Primary?    Ulcerated, foot, right, with fat layer exposed Yes    Diabetic polyneuropathy associated with type 2 diabetes mellitus     Peripheral vascular disease          Plan:       Richard was seen today for foot ulcer.    Diagnoses and all orders for this visit:    Ulcerated, foot, right, with fat layer exposed    Diabetic polyneuropathy associated with type 2 diabetes mellitus    Peripheral vascular disease         I counseled the patient on his conditions, their implications and medical management.    Debride right wound.    Dressed right foot wound with nicole, aperturex 2, wound foam, kerlixfootball - do not wet or change.     sx shoe right - ambulate minimally same with DM shoe/insert left.    Fill Rx new DM shoes, inserts custom, continue penlac.    Adequate vitamin supplementation, protein intake, and hydration - discussed with patient.    Discussed need for better offloading.  Possible TCC, but leg edema poses substantial risk.          Follow-up in about 1 week (around 3/7/2018).

## 2018-03-01 LAB — INR PPP: 1.7 (ref 2–3)

## 2018-03-02 ENCOUNTER — ANTI-COAG VISIT (OUTPATIENT)
Dept: CARDIOLOGY | Facility: CLINIC | Age: 73
End: 2018-03-02

## 2018-03-02 DIAGNOSIS — Z79.01 LONG TERM (CURRENT) USE OF ANTICOAGULANTS: ICD-10-CM

## 2018-03-02 DIAGNOSIS — D68.59 HYPERCOAGULABLE STATE: ICD-10-CM

## 2018-03-02 LAB
INR PPP: 1.7
PROTHROMBIN TIME: 17.7 SEC (ref 9–11.5)

## 2018-03-07 ENCOUNTER — OFFICE VISIT (OUTPATIENT)
Dept: PODIATRY | Facility: CLINIC | Age: 73
End: 2018-03-07
Payer: MEDICARE

## 2018-03-07 VITALS — HEIGHT: 69 IN | BODY MASS INDEX: 45.19 KG/M2 | WEIGHT: 305.13 LBS

## 2018-03-07 DIAGNOSIS — E11.42 DIABETIC POLYNEUROPATHY ASSOCIATED WITH TYPE 2 DIABETES MELLITUS: ICD-10-CM

## 2018-03-07 DIAGNOSIS — I73.9 PERIPHERAL VASCULAR DISEASE: ICD-10-CM

## 2018-03-07 DIAGNOSIS — L97.512 SKIN ULCER OF RIGHT FOOT WITH FAT LAYER EXPOSED: Primary | ICD-10-CM

## 2018-03-07 DIAGNOSIS — L97.311 ANKLE ULCER, RIGHT, LIMITED TO BREAKDOWN OF SKIN: ICD-10-CM

## 2018-03-07 PROCEDURE — 11042 DBRDMT SUBQ TIS 1ST 20SQCM/<: CPT | Mod: S$GLB,,, | Performed by: PODIATRIST

## 2018-03-07 PROCEDURE — 99499 UNLISTED E&M SERVICE: CPT | Mod: S$GLB,,, | Performed by: PODIATRIST

## 2018-03-07 PROCEDURE — 99999 PR PBB SHADOW E&M-EST. PATIENT-LVL III: CPT | Mod: PBBFAC,,, | Performed by: PODIATRIST

## 2018-03-07 NOTE — PROCEDURES
"Wound Debridement  Date/Time: 3/7/2018 11:12 AM  Performed by: ALBINO LARSON  Authorized by: ALBINO LARSON     Time out: Immediately prior to procedure a "time out" was called to verify the correct patient, procedure, equipment, support staff and site/side marked as required.    Consent Done?:  Yes (Verbal)  Local anesthesia used?: No      Wound Details:    Location:  Right foot    Location:  Right 1st Metatarsal Head    Type of Debridement:  Excisional       Length (cm):  0.4       Area (sq cm):  0.16       Width (cm):  0.4       Percent Debrided (%):  100       Depth (cm):  0.2       Total Area Debrided (sq cm):  0.16    Depth of debridement:  Subcutaneous tissue    Tissue debrided:  Dermis, Epidermis and Subcutaneous    Devitalized tissue debrided:  Biofilm and Callus    Instruments:  Blade and Curette    Bleeding:  Minimal  Hemostasis Achieved: Yes    Method Used:  Pressure  Patient tolerance:  Patient tolerated the procedure well with no immediate complications      "

## 2018-03-07 NOTE — PROGRESS NOTES
Subjective:      Patient ID: Richard Bustos is a 72 y.o. male.    Chief Complaint: Foot Ulcer (right foot )    Richard is a 72 y.o. male who presents to the clinic for evaluation and treatment of high risk feet. Richard has a past medical history of Anemia; Anemia of other chronic disease (9/13/2017); Anemia, chronic renal failure (9/13/2017); Anemia, unspecified (9/13/2017); Anticoagulant long-term use; Aorta aneurysm; Arthritis; Atrial fibrillation; CAD (coronary artery disease); CHF (congestive heart failure); Chronic kidney disease; Clotting disorder (9/13/2017); Colon polyp; Diabetes mellitus; Diabetes mellitus type II; Diverticulosis; Elevated PSA; Encounter for blood transfusion; Former smoker; GERD (gastroesophageal reflux disease); Gout; colonic polyps; Hypercoagulable state; Hyperlipidemia; Hypertension; MI, old (2010); Myocardial infarction; Prostate cancer (06/2016); Sleep apnea; and Venous stasis. The patient's chief complaint is diabetic foot ulcer, bottom right forefoot.  Dressed in football CDI.  Ambulating minimally in sx shoe right, DM shoe insert left.  Due for new ones after first of year. This patient has documented high risk feet requiring routine maintenance secondary to diabetes mellitis and those secondary complications of diabetes, as mentioned..  Dressing CDI changed at home following getting wet in the shower.  Denies signs infection today.  Did not get xray last visit as requested twice.      PCP: Familia Ramirez Jr, MD    Date Last Seen by PCP:   Chief Complaint   Patient presents with    Foot Ulcer     right foot          Current shoe gear:  Affected Foot: sx shoe     Unaffected Foot: Rx diabetic extra depth shoes and custom accommodative insoles    History of Trauma: negative  Sign of Infection: none    Hemoglobin A1C   Date Value Ref Range Status   09/19/2017 6.7 (H) 4.0 - 5.6 % Final     Comment:     According to ADA guidelines, hemoglobin A1c <7.0% represents  optimal control in  non-pregnant diabetic patients. Different  metrics may apply to specific patient populations.   Standards of Medical Care in Diabetes-2016.  For the purpose of screening for the presence of diabetes:  <5.7%     Consistent with the absence of diabetes  5.7-6.4%  Consistent with increasing risk for diabetes   (prediabetes)  >or=6.5%  Consistent with diabetes  Currently, no consensus exists for use of hemoglobin A1c  for diagnosis of diabetes for children.  This Hemoglobin A1c assay has significant interference with fetal   hemoglobin   (HbF). The results are invalid for patients with abnormal amounts of   HbF,   including those with known Hereditary Persistence   of Fetal Hemoglobin. Heterozygous hemoglobin variants (HbAS, HbAC,   HbAD, HbAE, HbA2) do not significantly interfere with this assay;   however, presence of multiple variants in a sample may impact the %   interference.     12/15/2014 4.7 4.5 - 6.2 % Final   06/17/2013 6.8 (H) 4.8 - 5.6 % Final     Comment:              Increased risk for diabetes: 5.7 - 6.4           Diabetes: >6.4           Glycemic control for adults with diabetes: <7.0  **In order to standardize %HbA1c results worldwide, as of October 11, 2010,  the %HbA1c is being calculated using the master equation recommended in the  consensus statement adopted by the ADA (American Diabetes Assoc), EASD  (European Assoc for the Study of Diabetes), IFCC (International Federation  of Clinical Chemistry and Laboratory Medicine) and IDF (International  Diabetes Federation). Result units: %HgbA1c (DCCT/NGSP).  In common with other methods, Hb A1C values may not accurately reflect mean  blood glucose in patients with hemoglobin variants (HgbF, HgbS and HgbC).  Any cause of shortened erythrocyte survival will reduce exposure of  erythrocytes to glucose with a consequent decrease in HbA1c (%) values, even  though the time-averaged blood glucose level may be elevated. Causes of  shortened erythrocyte lifetime  might be hemolytic anemia or other hemolytic  diseases, homozygous sickle cell trait, pregnancy, recent significant or  chronic blood loss, etc. Caution should be used when interpreting the HbA1c  results from patients with these conditions.   07/05/2012 5.7 4.0 - 6.2 % Final       Review of Systems   Constitution: Negative for chills, fever, malaise/fatigue and night sweats.   Cardiovascular: Negative for claudication, cyanosis and leg swelling.   Skin: Positive for nail changes and poor wound healing. Negative for color change, itching, rash and suspicious lesions.   Musculoskeletal: Negative for falls, gout, joint pain and myalgias.   Neurological: Positive for numbness, paresthesias and sensory change.           Objective:      Physical Exam   Constitutional: He appears well-developed and well-nourished. He is cooperative. No distress.   Cardiovascular:   Pulses:       Dorsalis pedis pulses are 1+ on the right side, and 1+ on the left side.        Posterior tibial pulses are 1+ on the right side, and 1+ on the left side.   Toes warm, pink, cap fill <5 seconds.   Musculoskeletal:   Hallux valgus and flexor toe deformities both feet without evidence of injury or loss of function.     All ten toes without clubbing, cyanosis, or signs of ischemia.  No pain to palpation bilateral lower extremities.  Range of motion, stability, muscle strength, and muscle tone normal bilateral feet and legs.     Lymphadenopathy:   Negative lymphadenopathy bilateral popliteal fossa and tarsal tunnel.  Negative streaking both feet.   Neurological: He has normal strength. He displays no tremor. A sensory deficit is present. He exhibits normal muscle tone.   Diminished/loss of protective sensation all toes bilateral to 10 gram monofilament.    Paresthesias, and burning bilateral feet with no clearly identified trigger or source.    Diminished/loss of protective sensation all toes bilateral to 10 gram monofilament.     Skin: Skin is warm  and dry. No abrasion, no bruising, no burn, no ecchymosis, no laceration, no lesion and no rash noted. He is not diaphoretic. No erythema. No pallor.   Wound:  Plantar right 1st mtpj    Size:    Pre:[id number]    Post: [id number]    Base:  Granular, pink with moderate serous/serosanaguinous drainage only.  No pus, tracking, fluctuance, malodor, or cardinal signs infection.    Borders:  Hyperkeratotic, debriding to flat, pink, blanchable skin edges without undermining.    Wound:  Anterior right ankle    Size:    Pre:[id number]      Base:  Granular, pink with moderate serous/serosanaguinous drainage only.  No pus, tracking, fluctuance, malodor, or cardinal signs infection.    Borders: flat, pink, blanchable skin edges without undermining.    Otherwise, Skin thin, shiny, atrophic, with decreased density and distribution of pedal hair bilateral, but without hyperpigmentation, minerva discoloration,  ulcers, masses, nodules or cords palpated bilateral feet and legs.                 Assessment:       No diagnosis found.      Plan:       There are no diagnoses linked to this encounter.     I counseled the patient on his conditions, their implications and medical management.    Debride right foot wound.    Dressed right foot wound with nicole, aperturex 2, wound foam, kerlixfootball - do not wet or change.    Dressed right ankle wound with mepilex border - change same every other day.     sx shoe right - ambulate minimally same with DM shoe/insert left.    Fill Rx new DM shoes, inserts custom, continue penlac.    Adequate vitamin supplementation, protein intake, and hydration - discussed with patient.    Discussed need for better offloading.  Possible TCC, but leg edema poses substantial risk.    Also considering biologic dressings.            Follow-up in about 1 week (around 3/14/2018).

## 2018-03-08 DIAGNOSIS — I48.0 PAROXYSMAL ATRIAL FIBRILLATION: ICD-10-CM

## 2018-03-08 DIAGNOSIS — Z15.89 MTHFR MUTATION (METHYLENETETRAHYDROFOLATE REDUCTASE): ICD-10-CM

## 2018-03-08 DIAGNOSIS — I25.10 CORONARY ARTERY DISEASE INVOLVING NATIVE CORONARY ARTERY OF NATIVE HEART WITHOUT ANGINA PECTORIS: Chronic | ICD-10-CM

## 2018-03-08 DIAGNOSIS — E78.5 HYPERLIPIDEMIA: ICD-10-CM

## 2018-03-09 RX ORDER — FUROSEMIDE 40 MG/1
TABLET ORAL
Qty: 90 TABLET | Refills: 3 | Status: SHIPPED | OUTPATIENT
Start: 2018-03-09 | End: 2018-03-14 | Stop reason: SDUPTHER

## 2018-03-09 RX ORDER — ATORVASTATIN CALCIUM 80 MG/1
TABLET, FILM COATED ORAL
Qty: 90 TABLET | Refills: 3 | Status: SHIPPED | OUTPATIENT
Start: 2018-03-09 | End: 2019-03-12 | Stop reason: SDUPTHER

## 2018-03-09 RX ORDER — WARFARIN SODIUM 5 MG/1
TABLET ORAL
Qty: 90 TABLET | Refills: 3 | Status: SHIPPED | OUTPATIENT
Start: 2018-03-09 | End: 2019-03-12 | Stop reason: SDUPTHER

## 2018-03-13 DIAGNOSIS — M54.5 CHRONIC LOW BACK PAIN, UNSPECIFIED BACK PAIN LATERALITY, WITH SCIATICA PRESENCE UNSPECIFIED: ICD-10-CM

## 2018-03-13 DIAGNOSIS — F41.9 ANXIETY: ICD-10-CM

## 2018-03-13 DIAGNOSIS — G89.29 CHRONIC LOW BACK PAIN, UNSPECIFIED BACK PAIN LATERALITY, WITH SCIATICA PRESENCE UNSPECIFIED: ICD-10-CM

## 2018-03-13 RX ORDER — ALPRAZOLAM 1 MG/1
TABLET ORAL
Qty: 30 TABLET | Refills: 0 | Status: SHIPPED | OUTPATIENT
Start: 2018-03-13 | End: 2018-04-12 | Stop reason: SDUPTHER

## 2018-03-13 RX ORDER — HYDROCODONE BITARTRATE AND ACETAMINOPHEN 7.5; 325 MG/1; MG/1
1 TABLET ORAL EVERY 6 HOURS PRN
Qty: 90 TABLET | Refills: 0 | Status: SHIPPED | OUTPATIENT
Start: 2018-03-13 | End: 2018-04-12 | Stop reason: SDUPTHER

## 2018-03-13 NOTE — TELEPHONE ENCOUNTER
----- Message from Rdaha Montague sent at 3/13/2018 12:14 PM CDT -----  Contact: Citlali manuel  Type:  RX Refill Request    Who Called:  Citlali manuel  Refill or New Rx:  refill  RX Name and Strength:  Norco and Xanax  How is the patient currently taking it? (ex. 1XDay):    Is this a 30 day or 90 day RX:  30  Preferred Pharmacy with phone number:  [facility]   Local or Mail Order:  local  Ordering Provider:  Dr Ramirez  [facility] Call Back Number:  [phone]  Additional Information:    [facility] [facility] [address] [address] AT [facility] [address] [address]  Phone: [phone] Fax: [phone]

## 2018-03-13 NOTE — TELEPHONE ENCOUNTER
lov 1/4/18  lab   last written   xanax 2/15/18  norco 2/15/18  appt 4/30/18    Pain contract 2/1/17  Drug screen 1/4/18

## 2018-03-14 ENCOUNTER — OFFICE VISIT (OUTPATIENT)
Dept: HEMATOLOGY/ONCOLOGY | Facility: CLINIC | Age: 73
End: 2018-03-14
Payer: MEDICARE

## 2018-03-14 ENCOUNTER — OFFICE VISIT (OUTPATIENT)
Dept: PODIATRY | Facility: CLINIC | Age: 73
End: 2018-03-14
Payer: MEDICARE

## 2018-03-14 VITALS — WEIGHT: 303.81 LBS | HEIGHT: 70 IN | BODY MASS INDEX: 43.5 KG/M2

## 2018-03-14 VITALS
HEIGHT: 70 IN | RESPIRATION RATE: 18 BRPM | SYSTOLIC BLOOD PRESSURE: 108 MMHG | BODY MASS INDEX: 43.56 KG/M2 | DIASTOLIC BLOOD PRESSURE: 68 MMHG | TEMPERATURE: 98 F | WEIGHT: 304.31 LBS | HEART RATE: 53 BPM

## 2018-03-14 DIAGNOSIS — M10.9 ACUTE GOUT OF FOOT, UNSPECIFIED CAUSE, UNSPECIFIED LATERALITY: ICD-10-CM

## 2018-03-14 DIAGNOSIS — D63.8 ANEMIA, CHRONIC DISEASE: ICD-10-CM

## 2018-03-14 DIAGNOSIS — D64.9 ANEMIA, UNSPECIFIED TYPE: ICD-10-CM

## 2018-03-14 DIAGNOSIS — I73.9 PERIPHERAL VASCULAR DISEASE: ICD-10-CM

## 2018-03-14 DIAGNOSIS — I25.10 CORONARY ARTERY DISEASE INVOLVING NATIVE CORONARY ARTERY OF NATIVE HEART WITHOUT ANGINA PECTORIS: Chronic | ICD-10-CM

## 2018-03-14 DIAGNOSIS — Z15.89 MTHFR MUTATION (METHYLENETETRAHYDROFOLATE REDUCTASE): ICD-10-CM

## 2018-03-14 DIAGNOSIS — C61 PROSTATE CANCER: Primary | ICD-10-CM

## 2018-03-14 DIAGNOSIS — L97.512 SKIN ULCER OF RIGHT FOOT WITH FAT LAYER EXPOSED: Primary | ICD-10-CM

## 2018-03-14 DIAGNOSIS — K21.9 GASTROESOPHAGEAL REFLUX DISEASE, ESOPHAGITIS PRESENCE NOT SPECIFIED: ICD-10-CM

## 2018-03-14 DIAGNOSIS — I10 ESSENTIAL HYPERTENSION: ICD-10-CM

## 2018-03-14 DIAGNOSIS — D63.1 ANEMIA OF CHRONIC RENAL FAILURE, STAGE 3 (MODERATE): ICD-10-CM

## 2018-03-14 DIAGNOSIS — E11.42 DIABETIC POLYNEUROPATHY ASSOCIATED WITH TYPE 2 DIABETES MELLITUS: ICD-10-CM

## 2018-03-14 DIAGNOSIS — L97.311 ANKLE ULCER, RIGHT, LIMITED TO BREAKDOWN OF SKIN: ICD-10-CM

## 2018-03-14 DIAGNOSIS — I51.9 LV DYSFUNCTION: ICD-10-CM

## 2018-03-14 DIAGNOSIS — N18.30 ANEMIA OF CHRONIC RENAL FAILURE, STAGE 3 (MODERATE): ICD-10-CM

## 2018-03-14 PROCEDURE — 99212 OFFICE O/P EST SF 10 MIN: CPT | Mod: S$GLB,,, | Performed by: PODIATRIST

## 2018-03-14 PROCEDURE — 99213 OFFICE O/P EST LOW 20 MIN: CPT | Mod: ,,, | Performed by: INTERNAL MEDICINE

## 2018-03-14 PROCEDURE — 3078F DIAST BP <80 MM HG: CPT | Mod: CPTII,S$GLB,, | Performed by: PODIATRIST

## 2018-03-14 PROCEDURE — 3078F DIAST BP <80 MM HG: CPT | Mod: ,,, | Performed by: INTERNAL MEDICINE

## 2018-03-14 PROCEDURE — 99999 PR PBB SHADOW E&M-EST. PATIENT-LVL III: CPT | Mod: PBBFAC,,, | Performed by: PODIATRIST

## 2018-03-14 PROCEDURE — 3074F SYST BP LT 130 MM HG: CPT | Mod: ,,, | Performed by: INTERNAL MEDICINE

## 2018-03-14 PROCEDURE — 3074F SYST BP LT 130 MM HG: CPT | Mod: CPTII,S$GLB,, | Performed by: PODIATRIST

## 2018-03-14 RX ORDER — FUROSEMIDE 40 MG/1
TABLET ORAL
Qty: 90 TABLET | Refills: 3 | Status: SHIPPED | OUTPATIENT
Start: 2018-03-14 | End: 2019-05-08 | Stop reason: SDUPTHER

## 2018-03-14 RX ORDER — CALCITRIOL 0.25 UG/1
0.75 CAPSULE ORAL DAILY
COMMUNITY
Start: 2018-03-08 | End: 2018-06-19

## 2018-03-14 RX ORDER — ALLOPURINOL 100 MG/1
TABLET ORAL
Qty: 90 TABLET | Refills: 3 | Status: SHIPPED | OUTPATIENT
Start: 2018-03-14 | End: 2019-03-25 | Stop reason: SDUPTHER

## 2018-03-14 NOTE — PROGRESS NOTES
[facility] Hematology/Oncology  PROGRESS NOTE      Subjective:       Patient ID:   NAME: Richard Bustos : 1945     72 y.o. male    Referring Doc: Familia Ramirez Jr., *  Other Physicians: Bebeto Torrez Chamberlain    Chief Complaint:      History of Present Illness:     Patient returns today for a regularly scheduled follow-up visit.  The patient is doing ok overall with foot ulcer and infection on right foot. He has been under the care of [facility] and Dr Torrez with podiatry. He is breathing ok. He denies any current fever, CP, SOB, HA's or N/V.             ROS:   GEN: normal without any fever, night sweats or weight loss  HEENT: normal with no HA's, sore throat, stiff neck, changes in vision  CV: normal with no CP, SOB, PND, THOMPSON or orthopnea  PULM: normal with no SOB, cough, hemoptysis, sputum or pleuritic pain  GI: normal with no abdominal pain, nausea, vomiting, constipation, diarrhea, melanotic stools, BRBPR, or hematemesis  : normal with no hematuria, dysuria  BREAST: normal with no mass, discharge, pain  SKIN: normal with no rash, erythema, bruising, or swelling    Allergies:  Review of patient's allergies indicates:   Allergen Reactions    Shellfish containing products Other (See Comments)     Other reaction(s): Gout       Medications:    Current Outpatient Prescriptions:     alendronate-vitamin D3 (FOSAMAX PLUS D) 70 mg- 2,800 unit per tablet, Take 1 tablet by mouth every 7 days., Disp: 4 tablet, Rfl: 6    allopurinol (ZYLOPRIM) 100 MG tablet, TAKE 1 TABLET(100 MG) BY MOUTH EVERY DAY, Disp: 90 tablet, Rfl: 3    ALPRAZolam (XANAX) 1 MG tablet, TAKE 1 TABLET(1 MG) BY MOUTH EVERY EVENING, Disp: 30 tablet, Rfl: 0    aspirin (ECOTRIN) 81 MG EC tablet, Take 81 mg by mouth once daily., Disp: , Rfl:     atorvastatin (LIPITOR) 80 MG tablet, TAKE 1 TABLET(80 MG) BY MOUTH EVERY DAY, Disp: 90 tablet, Rfl: 3    calcium carbonate (TUMS ULTRA) 400 mg calcium (1,000 mg) Chew, Take 1 tablet (400 mg total) by  mouth once daily at 6am., Disp: 30 each, Rfl: 11    carvedilol (COREG) 25 MG tablet, TAKE 1 TABLET BY MOUTH TWICE DAILY WITH MEALS, Disp: 180 tablet, Rfl: 3    ciclopirox (PENLAC) 8 % Soln, Apply topically nightly. (Patient taking differently: Apply topically daily as needed. ), Disp: 6.6 mL, Rfl: 11    ciclopirox 0.77 % Gel, Apply topically 2 (two) times daily., Disp: 100 g, Rfl: 3    clopidogrel (PLAVIX) 75 mg tablet, Take 1 tablet (75 mg total) by mouth once daily., Disp: 90 tablet, Rfl: 11    clotrimazole-betamethasone 1-0.05% (LOTRISONE) cream, Apply topically 2 (two) times daily. To head of penis (Patient taking differently: Apply topically daily as needed. To head of penis), Disp: 1 Tube, Rfl: 2    ergocalciferol (ERGOCALCIFEROL) 50,000 unit Cap, TK 1 C PO Q WEEK, Disp: , Rfl: 0    escitalopram oxalate (LEXAPRO) 10 MG tablet, Take 1/2 tablet (5 mg) daily for first 7 days.  Thereafter take one tablet (10 mg) daily. (Patient taking differently: Take 10 mg by mouth once daily. ), Disp: 30 tablet, Rfl: 11    ferrous sulfate 325 mg (65 mg iron) Tab tablet, TAKE 1 TABLET BY MOUTH TWICE DAILY, Disp: 180 tablet, Rfl: 3    fluticasone (FLONASE) 50 mcg/actuation nasal spray, 2 sprays by Each Nare route once daily., Disp: 1 Bottle, Rfl: 11    FOLIC ACID/MULTIVIT-MIN/LUTEIN (CENTRUM SILVER ORAL), Take 1 tablet by mouth once daily., Disp: , Rfl:     furosemide (LASIX) 40 MG tablet, TAKE 1 TABLET BY MOUTH DAILY AS NEEDED, Disp: 90 tablet, Rfl: 3    hydrocodone-acetaminophen 7.5-325mg (NORCO) 7.5-325 mg per tablet, Take 1 tablet by mouth every 6 (six) hours as needed for Pain., Disp: 90 tablet, Rfl: 0    imiquimod (ALDARA) 5 % cream, , Disp: , Rfl:     ipratropium (ATROVENT) 0.02 % nebulizer solution, Take 2.5 mLs (500 mcg total) by nebulization 4 (four) times daily. Rescue, Disp: 1 Box, Rfl: 0    L-METHYL-B6-B12 3-35-2 mg Tab, TAKE 1 TABLET BY MOUTH TWICE DAILY, Disp: 180 tablet, Rfl: 3    lisinopril  "(PRINIVIL,ZESTRIL) 40 MG tablet, Take 40 mg by mouth every evening. , Disp: , Rfl:     MAGOX 400 mg tablet, TAKE 1 TABLET(400 MG) BY MOUTH EVERY DAY, Disp: 90 tablet, Rfl: 3    mirabegron (MYRBETRIQ) 50 mg Tb24, Take 1 tablet (50 mg total) by mouth once daily., Disp: 90 tablet, Rfl: 3    mupirocin (BACTROBAN) 2 % ointment, Apply topically 2 (two) times daily., Disp: 30 g, Rfl: 1    nitroGLYCERIN 0.4 MG/DOSE TL SPRY (NITROLINGUAL) 400 mcg/spray spray, PLACE 1 SPRAY UNDER THE TONGUE EVERY 5 MINUTES AS NEEDED FOR CHEST PAIN, Disp: 4.9 g, Rfl: 3    omeprazole (PRILOSEC) 20 MG capsule, TAKE 1 CAPSULE(20 MG) BY MOUTH EVERY DAY, Disp: 90 capsule, Rfl: 3    ranitidine (ZANTAC) 150 MG tablet, TAKE 1 TABLET(150 MG) BY MOUTH TWICE DAILY, Disp: 180 tablet, Rfl: 3    salicylic acid (SALACYN) 6 % Crea, Apply 1 application topically 2 (two) times daily. (Patient taking differently: Apply 1 application topically daily as needed. ), Disp: 454 g, Rfl: 11    vit C-vit E-lutein-min-om-3 (OCUVITE) 504-76-3-150 mg-unit-mg-mg Cap, Take 1 capsule by mouth once daily., Disp: , Rfl:     warfarin (COUMADIN) 5 MG tablet, TAKE 1 TABLET BY MOUTH EVERY DAY, Disp: 90 tablet, Rfl: 3    calcitRIOL (ROCALTROL) 0.25 MCG Cap, , Disp: , Rfl:     PMHx/PSHx Updates:  See patient's last visit with me on 9/13/2017  See H&P on 4/9/2014        Pathology:  Cancer Staging  No matching staging information was found for the patient.          Objective:     Vitals:  Blood pressure 108/68, pulse (!) 53, temperature 97.9 °F (36.6 °C), resp. rate 18, height 5' 10" (1.778 m), weight (!) 138 kg (304 lb 4.8 oz).    Physical Examination:   GEN: no apparent distress, comfortable; AAOx3  HEAD: atraumatic and normocephalic  EYES: no pallor, no icterus, PERRLA  ENT: OMM, no pharyngeal erythema, external ears WNL; no nasal discharge; no thrush  NECK: no masses, thyroid normal, trachea midline, no LAD/LN's, supple  CV: RRR with no murmur; normal pulse; normal S1 and " S2; no pedal edema  CHEST: Normal respiratory effort; CTAB; normal breath sounds; no wheeze or crackles  ABDOM: nontender and nondistended; soft; normal bowel sounds; no rebound/guarding; overweight  MUSC/Skeletal: ROM normal; no crepitus; joints normal; no deformities or arthropathy; uses cane to walk  EXTREM: no clubbing, cyanosis, inflammation or swelling; right foot wrapped and in soft boot  SKIN: no rashes, petechiae or subcutaneous nodules; skin cancer on face for which he has been on cream  : no mcdaniels  NEURO: grossly intact; motor/sensory WNL; AAOx3; no tremors  PSYCH: normal mood, affect and behavior  LYMPH: normal cervical, supraclavicular, axillary and groin LN's            Labs:     1/19/2018    Lab Results   Component Value Date    WBC 7.0 01/19/2018    HGB 11.5 (L) 01/19/2018    HCT 33.5 (L) 01/19/2018    MCV 98.0 01/19/2018     01/19/2018     3/2/2018  INR 1.7        Radiology/Diagnostic Studies:    No results found.    I have reviewed all available lab results and radiology reports.    Assessment/Plan:   (1) 72 y.o. male with diagnosis of chronic clot disorder with underlying MTHFR issues  - he was previously under the care of Dr Krunal Rodriguez with hematology at [facility]  - he has been on coumadin therapy per direction of Dr Tate with cardiology      (2) CAD - followed by Dr Tate    (3) Abdominal hematoma in past     (4) CRI - followed by Nephrology (Amy?)    (5) Chronic multifactorial anemia with underlying anemia of chronic disorders and chronic renal disease; chronic GIB  - latest hgb adequate at 11.5 and better than 5 months ago    (6) right foot diabetic ulcer/ prior left toe issues - followed by Dr Torrez     (7) Urology following for prostate - Dr Moss      PLAN:  1. Check up to date basic labs incl. Iron panel  2. F/u with PCP, card, and neph  3. Continue coumadin per Dr Tate's direction  4. F/u with Dr Torrez  5. RTC in 6 months  Fax note to Familia Ramirez Jr., *, Bebeto; Isa;  Shan    Discussion:     I have explained all of the above in detail and the patient understands all of the current recommendation(s). I have answered all of their questions to the best of my ability and to their complete satisfaction.   The patient is to continue with the current management plan.            Electronically signed by Kai Ortiz MD

## 2018-03-14 NOTE — PROGRESS NOTES
Subjective:      Patient ID: Richard Bustos is a 72 y.o. male.    Chief Complaint: Foot Ulcer (right foot)    Richard is a 72 y.o. male who presents to the clinic for evaluation and treatment of high risk feet. Richard has a past medical history of Anemia; Anemia of other chronic disease (9/13/2017); Anemia, chronic renal failure (9/13/2017); Anemia, unspecified (9/13/2017); Anticoagulant long-term use; Aorta aneurysm; Arthritis; Atrial fibrillation; CAD (coronary artery disease); CHF (congestive heart failure); Chronic kidney disease; Clotting disorder (9/13/2017); Colon polyp; Diabetes mellitus; Diabetes mellitus type II; Diverticulosis; Elevated PSA; Encounter for blood transfusion; Former smoker; GERD (gastroesophageal reflux disease); Gout; colonic polyps; Hypercoagulable state; Hyperlipidemia; Hypertension; MI, old (2010); Myocardial infarction; Prostate cancer (06/2016); Sleep apnea; and Venous stasis. The patient's chief complaint is diabetic foot ulcer, bottom right forefoot.  Dressed in football CDI.  Ambulating minimally in sx shoe right, DM shoe insert left.  Due for new ones after first of year. This patient has documented high risk feet requiring routine maintenance secondary to diabetes mellitis and those secondary complications of diabetes, as mentioned..  Dressing CDI changed at home following getting wet in the shower.  Denies signs infection today.    No dressing on ankle ulcer right past 2 days,.    xrays last visit negative osteolysis on imaged foot/ankle right.      PCP: Familia Ramirez Jr, MD    Date Last Seen by PCP:   Chief Complaint   Patient presents with    Foot Ulcer     right foot         Current shoe gear:  Affected Foot: sx shoe     Unaffected Foot: Rx diabetic extra depth shoes and custom accommodative insoles    History of Trauma: negative  Sign of Infection: none    Hemoglobin A1C   Date Value Ref Range Status   09/19/2017 6.7 (H) 4.0 - 5.6 % Final     Comment:     According to ADA  guidelines, hemoglobin A1c <7.0% represents  optimal control in non-pregnant diabetic patients. Different  metrics may apply to specific patient populations.   Standards of Medical Care in Diabetes-2016.  For the purpose of screening for the presence of diabetes:  <5.7%     Consistent with the absence of diabetes  5.7-6.4%  Consistent with increasing risk for diabetes   (prediabetes)  >or=6.5%  Consistent with diabetes  Currently, no consensus exists for use of hemoglobin A1c  for diagnosis of diabetes for children.  This Hemoglobin A1c assay has significant interference with fetal   hemoglobin   (HbF). The results are invalid for patients with abnormal amounts of   HbF,   including those with known Hereditary Persistence   of Fetal Hemoglobin. Heterozygous hemoglobin variants (HbAS, HbAC,   HbAD, HbAE, HbA2) do not significantly interfere with this assay;   however, presence of multiple variants in a sample may impact the %   interference.     12/15/2014 4.7 4.5 - 6.2 % Final   06/17/2013 6.8 (H) 4.8 - 5.6 % Final     Comment:              Increased risk for diabetes: 5.7 - 6.4           Diabetes: >6.4           Glycemic control for adults with diabetes: <7.0  **In order to standardize %HbA1c results worldwide, as of October 11, 2010,  the %HbA1c is being calculated using the master equation recommended in the  consensus statement adopted by the ADA (American Diabetes Assoc), EASD  (European Assoc for the Study of Diabetes), IFCC (International Federation  of Clinical Chemistry and Laboratory Medicine) and IDF (International  Diabetes Federation). Result units: %HgbA1c (DCCT/NGSP).  In common with other methods, Hb A1C values may not accurately reflect mean  blood glucose in patients with hemoglobin variants (HgbF, HgbS and HgbC).  Any cause of shortened erythrocyte survival will reduce exposure of  erythrocytes to glucose with a consequent decrease in HbA1c (%) values, even  though the time-averaged blood glucose  level may be elevated. Causes of  shortened erythrocyte lifetime might be hemolytic anemia or other hemolytic  diseases, homozygous sickle cell trait, pregnancy, recent significant or  chronic blood loss, etc. Caution should be used when interpreting the HbA1c  results from patients with these conditions.   07/05/2012 5.7 4.0 - 6.2 % Final       Review of Systems   Constitution: Negative for chills, fever, malaise/fatigue and night sweats.   Cardiovascular: Negative for claudication, cyanosis and leg swelling.   Skin: Positive for nail changes and poor wound healing. Negative for color change, itching, rash and suspicious lesions.   Musculoskeletal: Negative for falls, gout, joint pain and myalgias.   Neurological: Positive for numbness, paresthesias and sensory change.           Objective:      Physical Exam   Constitutional: He appears well-developed and well-nourished. He is cooperative. No distress.   Cardiovascular:   Pulses:       Dorsalis pedis pulses are 1+ on the right side, and 1+ on the left side.        Posterior tibial pulses are 1+ on the right side, and 1+ on the left side.   Toes warm, pink, cap fill <5 seconds.   Musculoskeletal:   Hallux valgus and flexor toe deformities both feet without evidence of injury or loss of function.     All ten toes without clubbing, cyanosis, or signs of ischemia.  No pain to palpation bilateral lower extremities.  Range of motion, stability, muscle strength, and muscle tone normal bilateral feet and legs.     Lymphadenopathy:   Negative lymphadenopathy bilateral popliteal fossa and tarsal tunnel.  Negative streaking both feet.   Neurological: He has normal strength. He displays no tremor. A sensory deficit is present. He exhibits normal muscle tone.   Diminished/loss of protective sensation all toes bilateral to 10 gram monofilament.    Paresthesias, and burning bilateral feet with no clearly identified trigger or source.    Diminished/loss of protective sensation all  toes bilateral to 10 gram monofilament.     Skin: Skin is warm and dry. No abrasion, no bruising, no burn, no ecchymosis, no laceration, no lesion and no rash noted. He is not diaphoretic. No erythema. No pallor.   Wound:  Plantar right 1st mtpj    Size:    Pre:[id number]        Base:  Granular, pink with moderate serous/serosanaguinous drainage only.  No pus, tracking, fluctuance, malodor, or cardinal signs infection.    Borders:   flat, pink, blanchable skin edges without undermining.    Wound:  Anterior right ankle    Size:    [id number]      Base:  Granular, pink with moderate serous/serosanaguinous drainage only.  No pus, tracking, fluctuance, malodor, or cardinal signs infection.    Borders: flat, pink, blanchable skin edges without undermining.    Otherwise, Skin thin, shiny, atrophic, with decreased density and distribution of pedal hair bilateral, but without hyperpigmentation, minerva discoloration,  ulcers, masses, nodules or cords palpated bilateral feet and legs.                 Assessment:       Encounter Diagnoses   Name Primary?    Skin ulcer of right foot with fat layer exposed Yes    Ankle ulcer, right, limited to breakdown of skin     Diabetic polyneuropathy associated with type 2 diabetes mellitus     Peripheral vascular disease          Plan:       Richard was seen today for foot ulcer.    Diagnoses and all orders for this visit:    Skin ulcer of right foot with fat layer exposed    Ankle ulcer, right, limited to breakdown of skin    Diabetic polyneuropathy associated with type 2 diabetes mellitus    Peripheral vascular disease         I counseled the patient on his conditions, their implications and medical management.    Dressed right foot wound with aperture x 2, wound foam, kerlixfootball - do not wet or change.    Dressed right ankle wound with mepilex border - change same every other day.     sx shoe right - ambulate minimally same with DM shoe/insert left.    Fill Rx new DM shoes, inserts  custom, continue penlac.    Adequate vitamin supplementation, protein intake, and hydration - discussed with patient.    Discussed need for better offloading.  Possible TCC, but leg edema poses substantial risk.              Follow-up in about 1 week (around 3/21/2018).

## 2018-03-14 NOTE — LETTER
March 14, 2018      Familia Ramirez Jr., MD  [address] [address]           [facility] Hematology Oncology  [address]  [address]  [place] [address]  Phone: [phone]  Fax: [phone]          Patient: Richard Bustos   MR Number: [id number]   YOB: 1945   Date of Visit: 3/14/2018       Dear Dr. Familia Ramirez Jr.:    Thank you for referring Richard Bustos to me for evaluation. Attached you will find relevant portions of my assessment and plan of care.    If you have questions, please do not hesitate to call me. I look forward to following Richard Bustos along with you.    Sincerely,    Kai Ortiz MD    Enclosure  CC:  No Recipients    If you would like to receive this communication electronically, please contact [email or web address] or [phone] to request more information on [facility] Link access.    For providers and/or their staff who would like to refer a patient to [facility], please contact us through our one-stop-shop provider referral line, [facility], at [phone].    If you feel you have received this communication in error or would no longer like to receive these types of communications, please e-mail [email or web address]

## 2018-03-15 RX ORDER — LISINOPRIL 40 MG/1
TABLET ORAL
Qty: 90 TABLET | Refills: 3 | Status: SHIPPED | OUTPATIENT
Start: 2018-03-15 | End: 2019-03-25 | Stop reason: SDUPTHER

## 2018-03-16 RX ORDER — OMEPRAZOLE 20 MG/1
CAPSULE, DELAYED RELEASE ORAL
Qty: 90 CAPSULE | Refills: 0 | OUTPATIENT
Start: 2018-03-16

## 2018-03-21 ENCOUNTER — OFFICE VISIT (OUTPATIENT)
Dept: PODIATRY | Facility: CLINIC | Age: 73
End: 2018-03-21
Payer: MEDICARE

## 2018-03-21 VITALS — BODY MASS INDEX: 44.16 KG/M2 | WEIGHT: 308.44 LBS | HEIGHT: 70 IN

## 2018-03-21 DIAGNOSIS — Z87.898 HISTORY OF ULCERATION: Primary | ICD-10-CM

## 2018-03-21 DIAGNOSIS — E11.42 DIABETIC POLYNEUROPATHY ASSOCIATED WITH TYPE 2 DIABETES MELLITUS: ICD-10-CM

## 2018-03-21 DIAGNOSIS — L97.311 ANKLE ULCER, RIGHT, LIMITED TO BREAKDOWN OF SKIN: ICD-10-CM

## 2018-03-21 DIAGNOSIS — I73.9 PERIPHERAL VASCULAR DISEASE: ICD-10-CM

## 2018-03-21 PROCEDURE — 99212 OFFICE O/P EST SF 10 MIN: CPT | Mod: S$GLB,,, | Performed by: PODIATRIST

## 2018-03-21 PROCEDURE — 3044F HG A1C LEVEL LT 7.0%: CPT | Mod: CPTII,S$GLB,, | Performed by: PODIATRIST

## 2018-03-21 PROCEDURE — 99999 PR PBB SHADOW E&M-EST. PATIENT-LVL III: CPT | Mod: PBBFAC,,, | Performed by: PODIATRIST

## 2018-03-21 NOTE — PROGRESS NOTES
Subjective:      Patient ID: Richard Bustos is a 72 y.o. male.    Chief Complaint: Foot Ulcer (right foot)    Richard is a 72 y.o. male who presents to the clinic for evaluation and treatment of high risk feet. Richard has a past medical history of Anemia; Anemia of other chronic disease (9/13/2017); Anemia, chronic renal failure (9/13/2017); Anemia, unspecified (9/13/2017); Anticoagulant long-term use; Aorta aneurysm; Arthritis; Atrial fibrillation; CAD (coronary artery disease); CHF (congestive heart failure); Chronic kidney disease; Clotting disorder (9/13/2017); Colon polyp; Diabetes mellitus; Diabetes mellitus type II; Diverticulosis; Elevated PSA; Encounter for blood transfusion; Former smoker; GERD (gastroesophageal reflux disease); Gout; colonic polyps; Hypercoagulable state; Hyperlipidemia; Hypertension; MI, old (2010); Myocardial infarction; Prostate cancer (06/2016); Sleep apnea; and Venous stasis. The patient's chief complaint is diabetic foot ulcer, bottom right forefoot.  Dressed in football CDI.  Ambulating minimally in sx shoe right, DM shoe insert left.   This patient has documented high risk feet requiring routine maintenance secondary to diabetes mellitis and those secondary complications of diabetes, as mentioned..      Right ankle dressing bordered gauze - CDI also.        PCP: Familia Ramirez Jr, MD    Date Last Seen by PCP:   Chief Complaint   Patient presents with    Foot Ulcer     right foot         Current shoe gear:  Affected Foot: sx shoe     Unaffected Foot: Rx diabetic extra depth shoes and custom accommodative insoles    History of Trauma: negative  Sign of Infection: none    Hemoglobin A1C   Date Value Ref Range Status   09/19/2017 6.7 (H) 4.0 - 5.6 % Final     Comment:     According to ADA guidelines, hemoglobin A1c <7.0% represents  optimal control in non-pregnant diabetic patients. Different  metrics may apply to specific patient populations.   Standards of Medical Care in  Diabetes-2016.  For the purpose of screening for the presence of diabetes:  <5.7%     Consistent with the absence of diabetes  5.7-6.4%  Consistent with increasing risk for diabetes   (prediabetes)  >or=6.5%  Consistent with diabetes  Currently, no consensus exists for use of hemoglobin A1c  for diagnosis of diabetes for children.  This Hemoglobin A1c assay has significant interference with fetal   hemoglobin   (HbF). The results are invalid for patients with abnormal amounts of   HbF,   including those with known Hereditary Persistence   of Fetal Hemoglobin. Heterozygous hemoglobin variants (HbAS, HbAC,   HbAD, HbAE, HbA2) do not significantly interfere with this assay;   however, presence of multiple variants in a sample may impact the %   interference.     12/15/2014 4.7 4.5 - 6.2 % Final   06/17/2013 6.8 (H) 4.8 - 5.6 % Final     Comment:              Increased risk for diabetes: 5.7 - 6.4           Diabetes: >6.4           Glycemic control for adults with diabetes: <7.0  **In order to standardize %HbA1c results worldwide, as of October 11, 2010,  the %HbA1c is being calculated using the master equation recommended in the  consensus statement adopted by the ADA (American Diabetes Assoc), EASD  (European Assoc for the Study of Diabetes), IFCC (International Federation  of Clinical Chemistry and Laboratory Medicine) and IDF (International  Diabetes Federation). Result units: %HgbA1c (DCCT/NGSP).  In common with other methods, Hb A1C values may not accurately reflect mean  blood glucose in patients with hemoglobin variants (HgbF, HgbS and HgbC).  Any cause of shortened erythrocyte survival will reduce exposure of  erythrocytes to glucose with a consequent decrease in HbA1c (%) values, even  though the time-averaged blood glucose level may be elevated. Causes of  shortened erythrocyte lifetime might be hemolytic anemia or other hemolytic  diseases, homozygous sickle cell trait, pregnancy, recent significant  or  chronic blood loss, etc. Caution should be used when interpreting the HbA1c  results from patients with these conditions.   07/05/2012 5.7 4.0 - 6.2 % Final       Review of Systems   Constitution: Negative for chills, fever, malaise/fatigue and night sweats.   Cardiovascular: Negative for claudication, cyanosis and leg swelling.   Skin: Positive for nail changes and poor wound healing. Negative for color change, itching, rash and suspicious lesions.   Musculoskeletal: Negative for falls, gout, joint pain and myalgias.   Neurological: Positive for numbness, paresthesias and sensory change.           Objective:      Physical Exam   Constitutional: He appears well-developed and well-nourished. He is cooperative. No distress.   Cardiovascular:   Pulses:       Dorsalis pedis pulses are 1+ on the right side, and 1+ on the left side.        Posterior tibial pulses are 1+ on the right side, and 1+ on the left side.   Toes warm, pink, cap fill <5 seconds.   Musculoskeletal:   Hallux valgus and flexor toe deformities both feet without evidence of injury or loss of function.     All ten toes without clubbing, cyanosis, or signs of ischemia.  No pain to palpation bilateral lower extremities.  Range of motion, stability, muscle strength, and muscle tone normal bilateral feet and legs.     Lymphadenopathy:   Negative lymphadenopathy bilateral popliteal fossa and tarsal tunnel.  Negative streaking both feet.   Neurological: He has normal strength. He displays no tremor. A sensory deficit is present. He exhibits normal muscle tone.   Diminished/loss of protective sensation all toes bilateral to 10 gram monofilament.    Paresthesias, and burning bilateral feet with no clearly identified trigger or source.    Diminished/loss of protective sensation all toes bilateral to 10 gram monofilament.     Skin: Skin is warm and dry. No abrasion, no bruising, no burn, no ecchymosis, no laceration, no lesion and no rash noted. He is not  diaphoretic. No erythema. No pallor.   Wound plantar right 1st mtpj closed today, epithelialized  without ulceration, drainage, pus, tracking, fluctuance, malodor, or cardinal signs infection.    Wound:  Anterior right ankle    Size:    [id number]      Base:  Granular, pink with moderate serous/serosanaguinous drainage only.  No pus, tracking, fluctuance, malodor, or cardinal signs infection.    Borders: flat, pink, blanchable skin edges without undermining.    Otherwise, Skin thin, shiny, atrophic, with decreased density and distribution of pedal hair bilateral, but without hyperpigmentation, minerva discoloration,  ulcers, masses, nodules or cords palpated bilateral feet and legs.                 Assessment:       Encounter Diagnoses   Name Primary?    History of ulceration Yes    Ankle ulcer, right, limited to breakdown of skin     Diabetic polyneuropathy associated with type 2 diabetes mellitus     Peripheral vascular disease          Plan:       Richard was seen today for foot ulcer.    Diagnoses and all orders for this visit:    History of ulceration    Ankle ulcer, right, limited to breakdown of skin    Diabetic polyneuropathy associated with type 2 diabetes mellitus    Peripheral vascular disease         I counseled the patient on his conditions, their implications and medical management.    Dressed right foot wound with aperture x 2, wound foam, kerlixfootball - do not wet or change.    Dressed right ankle wound with mepilex border - change same every other day.     sx shoe right - ambulate minimally same with DM shoe/insert left.    Fill Rx new DM shoes, inserts custom, continue penlac.    Adequate vitamin supplementation, protein intake, and hydration - discussed with patient.                  Follow-up in about 1 week (around 3/28/2018).

## 2018-03-27 ENCOUNTER — OFFICE VISIT (OUTPATIENT)
Dept: DERMATOLOGY | Facility: CLINIC | Age: 73
End: 2018-03-27
Payer: MEDICARE

## 2018-03-27 ENCOUNTER — ANTI-COAG VISIT (OUTPATIENT)
Dept: CARDIOLOGY | Facility: CLINIC | Age: 73
End: 2018-03-27

## 2018-03-27 ENCOUNTER — LAB VISIT (OUTPATIENT)
Dept: LAB | Facility: HOSPITAL | Age: 73
End: 2018-03-27
Attending: INTERNAL MEDICINE
Payer: MEDICARE

## 2018-03-27 VITALS — WEIGHT: 308 LBS | BODY MASS INDEX: 44.09 KG/M2 | HEIGHT: 70 IN

## 2018-03-27 DIAGNOSIS — Z87.2 HISTORY OF ACTINIC KERATOSES: ICD-10-CM

## 2018-03-27 DIAGNOSIS — D68.59 HYPERCOAGULABLE STATE: ICD-10-CM

## 2018-03-27 DIAGNOSIS — Z79.01 LONG TERM CURRENT USE OF ANTICOAGULANT THERAPY: ICD-10-CM

## 2018-03-27 DIAGNOSIS — Z86.007 HISTORY OF SQUAMOUS CELL CARCINOMA IN SITU: Primary | ICD-10-CM

## 2018-03-27 DIAGNOSIS — Z79.01 LONG TERM (CURRENT) USE OF ANTICOAGULANTS: Primary | ICD-10-CM

## 2018-03-27 LAB
INR PPP: 1.1
PROTHROMBIN TIME: 11.4 SEC

## 2018-03-27 PROCEDURE — 36415 COLL VENOUS BLD VENIPUNCTURE: CPT

## 2018-03-27 PROCEDURE — 99999 PR PBB SHADOW E&M-EST. PATIENT-LVL II: CPT | Mod: PBBFAC,,, | Performed by: DERMATOLOGY

## 2018-03-27 PROCEDURE — 99212 OFFICE O/P EST SF 10 MIN: CPT | Mod: S$GLB,,, | Performed by: DERMATOLOGY

## 2018-03-27 PROCEDURE — 85610 PROTHROMBIN TIME: CPT

## 2018-03-27 NOTE — PROGRESS NOTES
Subjective:       Patient ID:  Richard Bustos is a 72 y.o. male who presents for   Chief Complaint   Patient presents with    Follow-up     immiquimoid follow up     Patient last seen 1/22/2018  No prior h./o skin cancer, no h/o lesions biopsied + skin tags removal, no h/o cryotherapy  Endorses lilfelong intense sun exposure  Biopsy of SCCIS left helix, treated with efudex daily x 4 weeks  Also treated eyebrows and cheeks, last application 4 days ago             Review of Systems   Constitutional: Negative for fever, chills and fatigue.   Skin: Positive for wears hat. Negative for daily sunscreen use and activity-related sunscreen use.   Hematologic/Lymphatic: Bruises/bleeds easily.        Objective:    Physical Exam   Constitutional: He appears well-developed and well-nourished. No distress.   Neurological: He is alert and oriented to person, place, and time. He is not disoriented.   Psychiatric: He has a normal mood and affect.   Skin:   Areas Examined (abnormalities noted in diagram):   Head / Face Inspection Performed                   Diagram Legend     Erythematous scaling macule/papule c/w actinic keratosis       Vascular papule c/w angioma      Pigmented verrucoid papule/plaque c/w seborrheic keratosis      Yellow umbilicated papule c/w sebaceous hyperplasia      Irregularly shaped tan macule c/w lentigo     1-2 mm smooth white papules consistent with Milia      Movable subcutaneous cyst with punctum c/w epidermal inclusion cyst      Subcutaneous movable cyst c/w pilar cyst      Firm pink to brown papule c/w dermatofibroma      Pedunculated fleshy papule(s) c/w skin tag(s)      Evenly pigmented macule c/w junctional nevus     Mildly variegated pigmented, slightly irregular-bordered macule c/w mildly atypical nevus      Flesh colored to evenly pigmented papule c/w intradermal nevus       Pink pearly papule/plaque c/w basal cell carcinoma      Erythematous hyperkeratotic cursted plaque c/w SCC      Surgical  scar with no sign of skin cancer recurrence      Open and closed comedones      Inflammatory papules and pustules      Verrucoid papule consistent consistent with wart     Erythematous eczematous patches and plaques     Dystrophic onycholytic nail with subungual debris c/w onychomycosis     Umbilicated papule    Erythematous-base heme-crusted tan verrucoid plaque consistent with inflamed seborrheic keratosis     Erythematous Silvery Scaling Plaque c/w Psoriasis     See annotation      Assessment / Plan:        History of squamous cell carcinoma in situ, left helix  Cleared with efudex post biopsy  Reevaluate in 6 months    History of actinic keratoses  Cryo + efudex, still pink scaly from recent efudex treatment, may discontinue    Patient instructed in importance in daily sun protection of at least spf 30. Sun avoidance and topical protection discussed.   Recommend Elta MD (physician office) COTZ sensitive (available online) for daily use on face and neck.  Patient encouraged to wear hat for all outdoor exposure.   Also discussed sun protective clothing.               No Follow-up on file.

## 2018-03-27 NOTE — PROGRESS NOTES
Spoke with lisa she states we need to call pt to question because she can no say if pt has been taking coumadin or not I placed a call to the pt a family member picked up and asked that we call back in 20 min

## 2018-03-28 ENCOUNTER — OFFICE VISIT (OUTPATIENT)
Dept: PODIATRY | Facility: CLINIC | Age: 73
End: 2018-03-28
Payer: MEDICARE

## 2018-03-28 VITALS — BODY MASS INDEX: 43.23 KG/M2 | HEIGHT: 70 IN | WEIGHT: 302 LBS

## 2018-03-28 DIAGNOSIS — E11.42 DIABETIC POLYNEUROPATHY ASSOCIATED WITH TYPE 2 DIABETES MELLITUS: ICD-10-CM

## 2018-03-28 DIAGNOSIS — Z87.898 HISTORY OF ULCERATION: Primary | ICD-10-CM

## 2018-03-28 DIAGNOSIS — M20.41 HAMMER TOES OF BOTH FEET: ICD-10-CM

## 2018-03-28 DIAGNOSIS — L97.311 ANKLE ULCER, RIGHT, LIMITED TO BREAKDOWN OF SKIN: ICD-10-CM

## 2018-03-28 DIAGNOSIS — M20.42 HAMMER TOES OF BOTH FEET: ICD-10-CM

## 2018-03-28 PROCEDURE — 3044F HG A1C LEVEL LT 7.0%: CPT | Mod: CPTII,S$GLB,, | Performed by: PODIATRIST

## 2018-03-28 PROCEDURE — 99212 OFFICE O/P EST SF 10 MIN: CPT | Mod: S$GLB,,, | Performed by: PODIATRIST

## 2018-03-28 PROCEDURE — 99999 PR PBB SHADOW E&M-EST. PATIENT-LVL III: CPT | Mod: PBBFAC,,, | Performed by: PODIATRIST

## 2018-03-28 NOTE — PROGRESS NOTES
Subjective:      Patient ID: Richard Bustos is a 72 y.o. male.    Chief Complaint: Foot Ulcer (right foot and right lower leg)    Richard is a 72 y.o. male who presents to the clinic for evaluation and treatment of high risk feet. Richard has a past medical history of Anemia; Anemia of other chronic disease (9/13/2017); Anemia, chronic renal failure (9/13/2017); Anemia, unspecified (9/13/2017); Anticoagulant long-term use; Aorta aneurysm; Arthritis; Atrial fibrillation; CAD (coronary artery disease); CHF (congestive heart failure); Chronic kidney disease; Clotting disorder (9/13/2017); Colon polyp; Diabetes mellitus; Diabetes mellitus type II; Diverticulosis; Elevated PSA; Encounter for blood transfusion; Former smoker; GERD (gastroesophageal reflux disease); Gout; colonic polyps; Hypercoagulable state; Hyperlipidemia; Hypertension; MI, old (2010); Myocardial infarction; Prostate cancer (06/2016); Sleep apnea; and Venous stasis. The patient's chief complaint is diabetic foot ulcer, bottom right forefoot.  Dressed in football CDI.  Ambulating minimally in sx shoe right, DM shoe insert left.   This patient has documented high risk feet requiring routine maintenance secondary to diabetes mellitis and those secondary complications of diabetes, as mentioned..      Right ankle dressing bordered gauze - CDI also.        PCP: Familia Ramirez Jr, MD    Date Last Seen by PCP:   Chief Complaint   Patient presents with    Foot Ulcer     right foot and right lower leg         Current shoe gear:  Affected Foot: sx shoe     Unaffected Foot: Rx diabetic extra depth shoes and custom accommodative insoles    History of Trauma: negative  Sign of Infection: none    Hemoglobin A1C   Date Value Ref Range Status   09/19/2017 6.7 (H) 4.0 - 5.6 % Final     Comment:     According to ADA guidelines, hemoglobin A1c <7.0% represents  optimal control in non-pregnant diabetic patients. Different  metrics may apply to specific patient populations.    Standards of Medical Care in Diabetes-2016.  For the purpose of screening for the presence of diabetes:  <5.7%     Consistent with the absence of diabetes  5.7-6.4%  Consistent with increasing risk for diabetes   (prediabetes)  >or=6.5%  Consistent with diabetes  Currently, no consensus exists for use of hemoglobin A1c  for diagnosis of diabetes for children.  This Hemoglobin A1c assay has significant interference with fetal   hemoglobin   (HbF). The results are invalid for patients with abnormal amounts of   HbF,   including those with known Hereditary Persistence   of Fetal Hemoglobin. Heterozygous hemoglobin variants (HbAS, HbAC,   HbAD, HbAE, HbA2) do not significantly interfere with this assay;   however, presence of multiple variants in a sample may impact the %   interference.     12/15/2014 4.7 4.5 - 6.2 % Final   06/17/2013 6.8 (H) 4.8 - 5.6 % Final     Comment:              Increased risk for diabetes: 5.7 - 6.4           Diabetes: >6.4           Glycemic control for adults with diabetes: <7.0  **In order to standardize %HbA1c results worldwide, as of October 11, 2010,  the %HbA1c is being calculated using the master equation recommended in the  consensus statement adopted by the ADA (American Diabetes Assoc), EASD  (European Assoc for the Study of Diabetes), IFCC (International Federation  of Clinical Chemistry and Laboratory Medicine) and IDF (International  Diabetes Federation). Result units: %HgbA1c (DCCT/NGSP).  In common with other methods, Hb A1C values may not accurately reflect mean  blood glucose in patients with hemoglobin variants (HgbF, HgbS and HgbC).  Any cause of shortened erythrocyte survival will reduce exposure of  erythrocytes to glucose with a consequent decrease in HbA1c (%) values, even  though the time-averaged blood glucose level may be elevated. Causes of  shortened erythrocyte lifetime might be hemolytic anemia or other hemolytic  diseases, homozygous sickle cell trait,  pregnancy, recent significant or  chronic blood loss, etc. Caution should be used when interpreting the HbA1c  results from patients with these conditions.   07/05/2012 5.7 4.0 - 6.2 % Final       Review of Systems   Constitution: Negative for chills, fever, malaise/fatigue and night sweats.   Cardiovascular: Negative for claudication, cyanosis and leg swelling.   Skin: Positive for nail changes and poor wound healing. Negative for color change, itching, rash and suspicious lesions.   Musculoskeletal: Negative for falls, gout, joint pain and myalgias.   Neurological: Positive for numbness, paresthesias and sensory change.           Objective:      Physical Exam   Constitutional: He appears well-developed and well-nourished. He is cooperative. No distress.   Cardiovascular:   Pulses:       Dorsalis pedis pulses are 1+ on the right side, and 1+ on the left side.        Posterior tibial pulses are 1+ on the right side, and 1+ on the left side.   Toes warm, pink, cap fill <5 seconds.   Musculoskeletal:   Hallux valgus and flexor toe deformities both feet without evidence of injury or loss of function.     All ten toes without clubbing, cyanosis, or signs of ischemia.  No pain to palpation bilateral lower extremities.  Range of motion, stability, muscle strength, and muscle tone normal bilateral feet and legs.     Lymphadenopathy:   Negative lymphadenopathy bilateral popliteal fossa and tarsal tunnel.  Negative streaking both feet.   Neurological: He has normal strength. He displays no tremor. A sensory deficit is present. He exhibits normal muscle tone.   Diminished/loss of protective sensation all toes bilateral to 10 gram monofilament.    Paresthesias, and burning bilateral feet with no clearly identified trigger or source.    Diminished/loss of protective sensation all toes bilateral to 10 gram monofilament.     Skin: Skin is warm and dry. No abrasion, no bruising, no burn, no ecchymosis, no laceration, no lesion and  no rash noted. He is not diaphoretic. No erythema. No pallor.   Wound plantar right 1st mtpj remains closed today, epithelialized  without ulceration, drainage, pus, tracking, fluctuance, malodor, or cardinal signs infection.    Wound anterior right ankle closed today, epithelialized  without ulceration, drainage, pus, tracking, fluctuance, malodor, or cardinal signs infection.    Otherwise, Skin thin, shiny, atrophic, with decreased density and distribution of pedal hair bilateral, but without hyperpigmentation, minerva discoloration,  ulcers, masses, nodules or cords palpated bilateral feet and legs.                 Assessment:       Encounter Diagnoses   Name Primary?    History of ulceration Yes    Ankle ulcer, right, limited to breakdown of skin     Diabetic polyneuropathy associated with type 2 diabetes mellitus     Hammer toes of both feet          Plan:       Richard was seen today for foot ulcer.    Diagnoses and all orders for this visit:    History of ulceration  -     DIABETIC SHOES FOR HOME USE    Ankle ulcer, right, limited to breakdown of skin    Diabetic polyneuropathy associated with type 2 diabetes mellitus  -     DIABETIC SHOES FOR HOME USE    Hammer toes of both feet  -     DIABETIC SHOES FOR HOME USE         I counseled the patient on his conditions, their implications and medical management.    Dressed right foot wound with aperture x 2, wound foam, kerlix replace every other day.    Inspect feet multiple times daily for signs of occurrence/recurrence ulceration.     sx shoe right - ambulate minimally same with DM shoe/insert left.     Rx new DM shoes, inserts custom.    Adequate vitamin supplementation, protein intake, and hydration - discussed with patient.                  Follow-up in about 2 weeks (around 4/11/2018).

## 2018-04-03 ENCOUNTER — ANTI-COAG VISIT (OUTPATIENT)
Dept: CARDIOLOGY | Facility: CLINIC | Age: 73
End: 2018-04-03

## 2018-04-03 DIAGNOSIS — D68.59 HYPERCOAGULABLE STATE: ICD-10-CM

## 2018-04-03 DIAGNOSIS — Z79.01 LONG TERM (CURRENT) USE OF ANTICOAGULANTS: ICD-10-CM

## 2018-04-12 ENCOUNTER — OFFICE VISIT (OUTPATIENT)
Dept: UROLOGY | Facility: CLINIC | Age: 73
End: 2018-04-12
Payer: MEDICARE

## 2018-04-12 VITALS
BODY MASS INDEX: 43.43 KG/M2 | TEMPERATURE: 98 F | HEIGHT: 70 IN | DIASTOLIC BLOOD PRESSURE: 65 MMHG | WEIGHT: 303.38 LBS | HEART RATE: 59 BPM | SYSTOLIC BLOOD PRESSURE: 127 MMHG

## 2018-04-12 DIAGNOSIS — N28.1 RENAL CYST, NATIVE, HEMORRHAGE: ICD-10-CM

## 2018-04-12 DIAGNOSIS — N39.41 URGE INCONTINENCE: ICD-10-CM

## 2018-04-12 DIAGNOSIS — N32.81 OAB (OVERACTIVE BLADDER): Primary | ICD-10-CM

## 2018-04-12 DIAGNOSIS — G89.29 CHRONIC LOW BACK PAIN, UNSPECIFIED BACK PAIN LATERALITY, WITH SCIATICA PRESENCE UNSPECIFIED: ICD-10-CM

## 2018-04-12 DIAGNOSIS — C61 PROSTATE CANCER: ICD-10-CM

## 2018-04-12 DIAGNOSIS — N28.89 RENAL CYST, NATIVE, HEMORRHAGE: ICD-10-CM

## 2018-04-12 DIAGNOSIS — F41.9 ANXIETY: ICD-10-CM

## 2018-04-12 DIAGNOSIS — M54.5 CHRONIC LOW BACK PAIN, UNSPECIFIED BACK PAIN LATERALITY, WITH SCIATICA PRESENCE UNSPECIFIED: ICD-10-CM

## 2018-04-12 LAB
BILIRUB SERPL-MCNC: ABNORMAL MG/DL
BLOOD URINE, POC: ABNORMAL
COLOR, POC UA: ABNORMAL
GLUCOSE UR QL STRIP: ABNORMAL
KETONES UR QL STRIP: ABNORMAL
LEUKOCYTE ESTERASE URINE, POC: ABNORMAL
NITRITE, POC UA: ABNORMAL
PH, POC UA: 5
PROTEIN, POC: ABNORMAL
SPECIFIC GRAVITY, POC UA: 1015
UROBILINOGEN, POC UA: ABNORMAL

## 2018-04-12 PROCEDURE — 99214 OFFICE O/P EST MOD 30 MIN: CPT | Mod: 25,S$GLB,, | Performed by: UROLOGY

## 2018-04-12 PROCEDURE — 87086 URINE CULTURE/COLONY COUNT: CPT

## 2018-04-12 PROCEDURE — 3078F DIAST BP <80 MM HG: CPT | Mod: CPTII,S$GLB,, | Performed by: UROLOGY

## 2018-04-12 PROCEDURE — 81002 URINALYSIS NONAUTO W/O SCOPE: CPT | Mod: S$GLB,,, | Performed by: UROLOGY

## 2018-04-12 PROCEDURE — 99999 PR PBB SHADOW E&M-EST. PATIENT-LVL V: CPT | Mod: PBBFAC,,, | Performed by: UROLOGY

## 2018-04-12 PROCEDURE — 3074F SYST BP LT 130 MM HG: CPT | Mod: CPTII,S$GLB,, | Performed by: UROLOGY

## 2018-04-12 RX ORDER — HYDROCODONE BITARTRATE AND ACETAMINOPHEN 7.5; 325 MG/1; MG/1
1 TABLET ORAL EVERY 6 HOURS PRN
Qty: 90 TABLET | Refills: 0 | Status: SHIPPED | OUTPATIENT
Start: 2018-04-12 | End: 2018-05-12 | Stop reason: SDUPTHER

## 2018-04-12 RX ORDER — ALPRAZOLAM 1 MG/1
TABLET ORAL
Qty: 30 TABLET | Refills: 0 | Status: SHIPPED | OUTPATIENT
Start: 2018-04-12 | End: 2018-05-12 | Stop reason: SDUPTHER

## 2018-04-12 NOTE — PATIENT INSTRUCTIONS
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

## 2018-04-12 NOTE — PROGRESS NOTES
[facility] Progress Note - [place]  Urology Group: Isa/Brijesh/Becca/Galina  PCP: Dr.James newcomb [facility]    Chief Complaint: Follow-up      Subjective:        HPI: Richard Bustos is a 72 y.o. male presents with     Last seen 1/2018     Prostate cancer, Gl 4+4  Pt was referred to  for Gl 4+4, T2c, N0M0 prostate cancer in June 2016. Decided on radiation with concurrent ADT x 2 years, first Trelstar given 6/2016.. Underwent gold seed on 8/1/16. Pt completed 7560 Gy, 42 fractions of IRT (9/1/16-10/31/16).  Last saw  4 months ago  Most recent psa <0.01 on 1/4/18    C/o pain in legs previously. But now feel weak, no pain. Has diabetic foot and foot brace which he sees  for.   No bone pain. Not exercising but active.     psa hx   1/12/18 Bone density scan normal.   1/4/18  <0.01, T 14 - administered trelstar, started Fosamax  6/12/17 <0.01, T 13 - administered trelstar, started ca/vitD  12/12/16 <0.01 - adminstered trelstar  10/31/16 Completed radiation   8/1/16  3.3 - underwent goldseed  6/2016  adminstered trelstar  5/23/16 trus bx: Gl 4+4, volume 49.6g. Bone scan and CT scan negative  3/18/16 7.7  2/3/15   6.1  8/22/14  5.3  9/26/11  3.9  8/26/10  3.0      oab  -He states he tolerated the radiation but when I saw him in December he was having Frequency q2-3 hours, UUI that occured 3-4x a daily requiring 1 pad a day. Not on any med. Denies straining. Good stream.   -I started him on myrbetriq 50mg once a day in 2017 which improved his oab sx - no longer needed pads, UUI decreased to 2x a month (uses a cane). But he stopped and was Voiding q1-2 hours. +UUI 2x a day now. I restarted myrbetriq 50mg on 1/18/18 and he states he voids q1.5hour. Still wears 1 depends a day for safety. No more leakage. Takes lasix twice a week.. Drinks decaf in the morning.    -denies any weak stream. Denies hesistancy.   AUA ssx:(2 incomplete emptying, 0 frequency, 2  "intermittency, 3 urgency, 1 weak stream, 1 straining, 9 sleeping). 18. QOL: mixed     Right renal hemhorragic cysts  rbus 1/18/17 confirmed these were cysts    He also had some skin lesions and I referred him to dermatology and his skin lesions were removed and found to be cancer.     REVIEW OF SYSTEMS:  General ROS: no fevers, no chills  Psychological ROS: no depression  Endocrine ROS: no heat or cold  Respiratory ROS: no SOB  Cardiovascular ROS: no CP  Gastrointestinal ROS: no abdominal pain, no constipation, no diarrhea, no BRBPR  Musculoskeletal ROS: no muscle pain  Neurological ROS: no headaches  Dermatological ROS: no rashes  HEENT: +glasses, no sinus   ROS: per HPI    The past medical, surgical, social and family hx were reviewed. There have not been any changes.    Cataracts? removed    Urologic meds: myrbetriq 50mg daily   Anticoagulation: Yes - coumadin for hypercoagulable state    Objective:     Vitals:    04/12/18 1000   BP: 127/65   Pulse: (!) 59   Temp: 97.8 °F (36.6 °C)          Physical Exam   Constitutional: He is oriented to person, place, and time. He appears well-developed and well-nourished. He is cooperative. No distress.   HENT:   Head: Normocephalic and atraumatic.   Eyes: Pupils are equal, round, and reactive to light.   Cardiovascular: Normal rate and regular rhythm.    Pulmonary/Chest: Effort normal.   Abdominal: Soft. Normal appearance.   Musculoskeletal: Normal range of motion.   Neurological: He is alert and oriented to person, place, and time. He has normal reflexes.   Skin: Skin is intact.     Psychiatric: He has a normal mood and affect.         Urinalysis: 1.015/5/2+protein/tr blood - send for culture, no dysuria "smell strong"    Labs:  9/19/17 Cr 1.6, seen by     Path:     LEFT    RIGHT  BASE   3+3 (1/2),  4+3 (1/2)     MID    4+4 (1/2)    b9  APEX    b9      b9   Date of Biopsy: 5/23/16  PSA: 7.7  Volume: 49.6  Prostate nodule: no    Rads:   DEXA bone density done for " ADT 1/12/18: Normal bone mineral density of the of the lumbar spine.    1/18/17 rbus  Multiple bilateral renal cysts  4 right renal hemhorragic cysts - 1.8 cm, 6.8 cm, 4.5 cm and 1.6     6/14/16   ctap   Bilateral renal masses  No significant adenopathy  8cm complex fluid collection    Bone scan 6/14/16  Negative    Assessment:       1. OAB (overactive bladder)    2. Urge incontinence    3. Prostate cancer    4. Renal cyst, native, hemorrhage        Plan:     Overactive bladder and urge incontinence  -doing well on oab med   -continue myrbetriq 50mg once daily  -avoid all caffeinated drinks    Prostate cancer, Gl 4+3 treated with radiation and ADT  -psa, T and ADT 6 month (last shot will be in June 2018)  -ADT x 2 years (last one will be June 2018)  -continue Vit D and calcium, fosamax 1x a week   -bone density scan normal      hemhorragic renal cyst  - rbus 1/2017 done and these were confirmed to be multiple right renal hemhorragic cysts    -c/o weakness in legs and sees  for diabetic foot  -recommended weight bearing exercise once able    -F/u June with T, PSA prior for ADT/trelstar   -psa 3 months after we stop trelstar for 2 years then every 6 months for 3 years then yearly indefinitely.

## 2018-04-12 NOTE — TELEPHONE ENCOUNTER
----- Message from Carrie Love sent at 4/12/2018 12:27 PM CDT -----  Type:  RX Refill Request    Who Called:  Citlali  Refill or New Rx:  refill  RX Name and Strength:  hydrocodone-acetaminophen 7.5-325mg (NORCO) 7.5-325 mg per tablet and ALPRAZolam (XANAX) 1 MG tablet  How is the patient currently taking it? (ex. 1XDay):   Is this a 30 day or 90 day RX:  30  Preferred Pharmacy with phone number:  [facility] in [facility] at [phone] (Phone)  Local or Mail Order: local  Ordering Provider:  Dr. Ramirez  [facility] Call Back Number:  [phone]  Additional Information:

## 2018-04-13 ENCOUNTER — TELEPHONE (OUTPATIENT)
Dept: FAMILY MEDICINE | Facility: CLINIC | Age: 73
End: 2018-04-13

## 2018-04-13 LAB — BACTERIA UR CULT: NORMAL

## 2018-04-16 ENCOUNTER — OFFICE VISIT (OUTPATIENT)
Dept: PODIATRY | Facility: CLINIC | Age: 73
End: 2018-04-16
Payer: MEDICARE

## 2018-04-16 VITALS — BODY MASS INDEX: 43.46 KG/M2 | WEIGHT: 303.56 LBS | HEIGHT: 70 IN

## 2018-04-16 DIAGNOSIS — E11.42 DIABETIC POLYNEUROPATHY ASSOCIATED WITH TYPE 2 DIABETES MELLITUS: Primary | ICD-10-CM

## 2018-04-16 DIAGNOSIS — L97.512 SKIN ULCER OF RIGHT FOOT WITH FAT LAYER EXPOSED: ICD-10-CM

## 2018-04-16 DIAGNOSIS — I73.9 PERIPHERAL VASCULAR DISEASE: ICD-10-CM

## 2018-04-16 PROCEDURE — 11042 DBRDMT SUBQ TIS 1ST 20SQCM/<: CPT | Mod: S$GLB,,, | Performed by: PODIATRIST

## 2018-04-16 PROCEDURE — 99499 UNLISTED E&M SERVICE: CPT | Mod: S$GLB,,, | Performed by: PODIATRIST

## 2018-04-16 PROCEDURE — 99999 PR PBB SHADOW E&M-EST. PATIENT-LVL III: CPT | Mod: PBBFAC,,, | Performed by: PODIATRIST

## 2018-04-16 NOTE — PROCEDURES
"Wound Debridement  Date/Time: 4/16/2018 10:10 AM  Performed by: ALBINO LARSON  Authorized by: ALBINO LARSON     Time out: Immediately prior to procedure a "time out" was called to verify the correct patient, procedure, equipment, support staff and site/side marked as required.    Consent Done?:  Yes (Verbal)  Local anesthesia used?: No      Wound Details:    Location:  Right foot    Location:  Right 1st Metatarsal Head    Type of Debridement:  Excisional       Length (cm):  0.5       Area (sq cm):  0.1       Width (cm):  0.2       Percent Debrided (%):  100       Depth (cm):  0.2       Total Area Debrided (sq cm):  0.1    Depth of debridement:  Subcutaneous tissue    Tissue debrided:  Dermis, Epidermis and Subcutaneous    Devitalized tissue debrided:  Callus, Exudate and Sough    Instruments:  Blade    Bleeding:  Minimal  Hemostasis Achieved: Yes    Method Used:  Pressure  Patient tolerance:  Patient tolerated the procedure well with no immediate complications      "

## 2018-04-16 NOTE — PROGRESS NOTES
Subjective:      Patient ID: Richard Bustos is a 72 y.o. male.    Chief Complaint: Wound Check (right foot ulcer f/u)    Richard is a 72 y.o. male who presents to the clinic for evaluation and treatment of high risk feet. Richard has a past medical history of Anemia; Anemia of other chronic disease (9/13/2017); Anemia, chronic renal failure (9/13/2017); Anemia, unspecified (9/13/2017); Anticoagulant long-term use; Aorta aneurysm; Arthritis; Atrial fibrillation; CAD (coronary artery disease); CHF (congestive heart failure); Chronic kidney disease; Clotting disorder (9/13/2017); Colon polyp; Diabetes mellitus; Diabetes mellitus type II; Diverticulosis; Elevated PSA; Encounter for blood transfusion; Former smoker; GERD (gastroesophageal reflux disease); Gout; colonic polyps; Hypercoagulable state; Hyperlipidemia; Hypertension; MI, old (2010); Myocardial infarction; Prostate cancer (06/2016); Sleep apnea; and Venous stasis. The patient's chief complaint is diabetic foot ulcer, bottom right forefoot - both closed last visit.  Dressed with band aid today.  Ambulating minimally in sx shoe right, DM shoe insert left.   This patient has documented high risk feet requiring routine maintenance secondary to diabetes mellitis and those secondary complications of diabetes, as mentioned..              PCP: Familia Ramirez Jr, MD    Date Last Seen by PCP:   Chief Complaint   Patient presents with    Wound Check     right foot ulcer f/u         Current shoe gear:  Affected Foot: sx shoe     Unaffected Foot: Rx diabetic extra depth shoes and custom accommodative insoles    History of Trauma: negative  Sign of Infection: none    Hemoglobin A1C   Date Value Ref Range Status   09/19/2017 6.7 (H) 4.0 - 5.6 % Final     Comment:     According to ADA guidelines, hemoglobin A1c <7.0% represents  optimal control in non-pregnant diabetic patients. Different  metrics may apply to specific patient populations.   Standards of Medical Care in  Diabetes-2016.  For the purpose of screening for the presence of diabetes:  <5.7%     Consistent with the absence of diabetes  5.7-6.4%  Consistent with increasing risk for diabetes   (prediabetes)  >or=6.5%  Consistent with diabetes  Currently, no consensus exists for use of hemoglobin A1c  for diagnosis of diabetes for children.  This Hemoglobin A1c assay has significant interference with fetal   hemoglobin   (HbF). The results are invalid for patients with abnormal amounts of   HbF,   including those with known Hereditary Persistence   of Fetal Hemoglobin. Heterozygous hemoglobin variants (HbAS, HbAC,   HbAD, HbAE, HbA2) do not significantly interfere with this assay;   however, presence of multiple variants in a sample may impact the %   interference.     12/15/2014 4.7 4.5 - 6.2 % Final   06/17/2013 6.8 (H) 4.8 - 5.6 % Final     Comment:              Increased risk for diabetes: 5.7 - 6.4           Diabetes: >6.4           Glycemic control for adults with diabetes: <7.0  **In order to standardize %HbA1c results worldwide, as of October 11, 2010,  the %HbA1c is being calculated using the master equation recommended in the  consensus statement adopted by the ADA (American Diabetes Assoc), EASD  (European Assoc for the Study of Diabetes), IFCC (International Federation  of Clinical Chemistry and Laboratory Medicine) and IDF (International  Diabetes Federation). Result units: %HgbA1c (DCCT/NGSP).  In common with other methods, Hb A1C values may not accurately reflect mean  blood glucose in patients with hemoglobin variants (HgbF, HgbS and HgbC).  Any cause of shortened erythrocyte survival will reduce exposure of  erythrocytes to glucose with a consequent decrease in HbA1c (%) values, even  though the time-averaged blood glucose level may be elevated. Causes of  shortened erythrocyte lifetime might be hemolytic anemia or other hemolytic  diseases, homozygous sickle cell trait, pregnancy, recent significant  or  chronic blood loss, etc. Caution should be used when interpreting the HbA1c  results from patients with these conditions.   07/05/2012 5.7 4.0 - 6.2 % Final       Review of Systems   Constitution: Negative for chills, fever, malaise/fatigue and night sweats.   Cardiovascular: Negative for claudication, cyanosis and leg swelling.   Skin: Positive for nail changes and poor wound healing. Negative for color change, itching, rash and suspicious lesions.   Musculoskeletal: Negative for falls, gout, joint pain and myalgias.   Neurological: Positive for numbness, paresthesias and sensory change.           Objective:      Physical Exam   Constitutional: He appears well-developed and well-nourished. He is cooperative. No distress.   Cardiovascular:   Pulses:       Dorsalis pedis pulses are 1+ on the right side, and 1+ on the left side.        Posterior tibial pulses are 1+ on the right side, and 1+ on the left side.   Toes warm, pink, cap fill <5 seconds.   Musculoskeletal:   Hallux valgus and flexor toe deformities both feet without evidence of injury or loss of function.     All ten toes without clubbing, cyanosis, or signs of ischemia.  No pain to palpation bilateral lower extremities.  Range of motion, stability, muscle strength, and muscle tone normal bilateral feet and legs.     Lymphadenopathy:   Negative lymphadenopathy bilateral popliteal fossa and tarsal tunnel.  Negative streaking both feet.   Neurological: He has normal strength. He displays no tremor. A sensory deficit is present. He exhibits normal muscle tone.   Diminished/loss of protective sensation all toes bilateral to 10 gram monofilament.    Paresthesias, and burning bilateral feet with no clearly identified trigger or source.    Diminished/loss of protective sensation all toes bilateral to 10 gram monofilament.     Skin: Skin is warm and dry. No abrasion, no bruising, no burn, no ecchymosis, no laceration, no lesion and no rash noted. He is not  diaphoretic. No erythema. No pallor.   Wound:  Plantar right 1st mtpj    Size:    Pre:[id number] - fluctuant skin beneath callus  Post: [id number]    Base:  Granular, pink with moderate serous/serosanaguinous drainage only.  No pus, tracking, fluctuance, malodor, or cardinal signs infection.    Borders:  Hyperkeratotic, debriding to flat, pink, blanchable skin edges without undermining.      Wound anterior right ankle remains closed today, epithelialized  without ulceration, drainage, pus, tracking, fluctuance, malodor, or cardinal signs infection.    Otherwise, Skin thin, shiny, atrophic, with decreased density and distribution of pedal hair bilateral, but without hyperpigmentation, minerva discoloration,  ulcers, masses, nodules or cords palpated bilateral feet and legs.                 Assessment:       Encounter Diagnoses   Name Primary?    Diabetic polyneuropathy associated with type 2 diabetes mellitus Yes    Peripheral vascular disease     Skin ulcer of right foot with fat layer exposed          Plan:       Richard was seen today for wound check.    Diagnoses and all orders for this visit:    Diabetic polyneuropathy associated with type 2 diabetes mellitus    Peripheral vascular disease    Skin ulcer of right foot with fat layer exposed  -     X-Ray Foot Complete Right; Future         I counseled the patient on his conditions, their implications and medical management.    xrays right foot.    Debrided wound.    Cover with bordered gauze changing every other day.                  Follow-up in about 1 week (around 4/23/2018).

## 2018-04-23 ENCOUNTER — LAB VISIT (OUTPATIENT)
Dept: LAB | Facility: HOSPITAL | Age: 73
End: 2018-04-23
Attending: INTERNAL MEDICINE
Payer: MEDICARE

## 2018-04-23 ENCOUNTER — ANTI-COAG VISIT (OUTPATIENT)
Dept: CARDIOLOGY | Facility: CLINIC | Age: 73
End: 2018-04-23

## 2018-04-23 ENCOUNTER — OFFICE VISIT (OUTPATIENT)
Dept: PODIATRY | Facility: CLINIC | Age: 73
End: 2018-04-23
Payer: MEDICARE

## 2018-04-23 VITALS — WEIGHT: 302.5 LBS | HEIGHT: 70 IN | BODY MASS INDEX: 43.31 KG/M2

## 2018-04-23 DIAGNOSIS — I73.9 PERIPHERAL VASCULAR DISEASE: ICD-10-CM

## 2018-04-23 DIAGNOSIS — E11.42 DIABETIC POLYNEUROPATHY ASSOCIATED WITH TYPE 2 DIABETES MELLITUS: Primary | ICD-10-CM

## 2018-04-23 DIAGNOSIS — L97.512 SKIN ULCER OF RIGHT FOOT WITH FAT LAYER EXPOSED: ICD-10-CM

## 2018-04-23 DIAGNOSIS — D68.59 HYPERCOAGULABLE STATE: ICD-10-CM

## 2018-04-23 DIAGNOSIS — Z79.01 LONG TERM (CURRENT) USE OF ANTICOAGULANTS: ICD-10-CM

## 2018-04-23 LAB
INR PPP: 1.3
PROTHROMBIN TIME: 12.7 SEC

## 2018-04-23 PROCEDURE — 99999 PR PBB SHADOW E&M-EST. PATIENT-LVL III: CPT | Mod: PBBFAC,,, | Performed by: PODIATRIST

## 2018-04-23 PROCEDURE — 99499 UNLISTED E&M SERVICE: CPT | Mod: S$GLB,,, | Performed by: PODIATRIST

## 2018-04-23 PROCEDURE — 11042 DBRDMT SUBQ TIS 1ST 20SQCM/<: CPT | Mod: S$GLB,,, | Performed by: PODIATRIST

## 2018-04-23 PROCEDURE — 85610 PROTHROMBIN TIME: CPT

## 2018-04-23 PROCEDURE — 36415 COLL VENOUS BLD VENIPUNCTURE: CPT

## 2018-04-23 NOTE — PROCEDURES
"Wound Debridement  Date/Time: 4/23/2018 9:36 AM  Performed by: ALBINO LARSON  Authorized by: ALBINO LARSON     Time out: Immediately prior to procedure a "time out" was called to verify the correct patient, procedure, equipment, support staff and site/side marked as required.    Consent Done?:  Yes (Verbal)  Local anesthesia used?: No      Wound Details:    Location:  Right foot    Location:  Right 1st Metatarsal Head    Type of Debridement:  Excisional       Length (cm):  1       Area (sq cm):  0.5       Width (cm):  0.5       Percent Debrided (%):  100       Depth (cm):  0.3       Total Area Debrided (sq cm):  0.5    Devitalized tissue debrided:  Biofilm, Callus and Sough    Instruments:  Curette    Bleeding:  Minimal  Hemostasis Achieved: Yes    Method Used:  Pressure  Patient tolerance:  Patient tolerated the procedure well with no immediate complications      "

## 2018-04-23 NOTE — PROGRESS NOTES
Subjective:      Patient ID: Richard Bustos is a 72 y.o. male.    Chief Complaint: Foot Ulcer (right)    Richard is a 72 y.o. male who presents to the clinic for evaluation and treatment of high risk feet. Richard has a past medical history of Anemia; Anemia of other chronic disease (9/13/2017); Anemia, chronic renal failure (9/13/2017); Anemia, unspecified (9/13/2017); Anticoagulant long-term use; Aorta aneurysm; Arthritis; Atrial fibrillation; CAD (coronary artery disease); CHF (congestive heart failure); Chronic kidney disease; Clotting disorder (9/13/2017); Colon polyp; Diabetes mellitus; Diabetes mellitus type II; Diverticulosis; Elevated PSA; Encounter for blood transfusion; Former smoker; GERD (gastroesophageal reflux disease); Gout; colonic polyps; Hypercoagulable state; Hyperlipidemia; Hypertension; MI, old (2010); Myocardial infarction; Prostate cancer (06/2016); Sleep apnea; and Venous stasis. The patient's chief complaint is diabetic foot ulcer, bottom right forefoot  Dressed with gauze pad/kerlix today. CDI.  Ambulating minimally in, DM shoe insert right and left.   This patient has documented high risk feet requiring routine maintenance secondary to diabetes mellitis and those secondary complications of diabetes, as mentioned..              PCP: Familia Ramirez Jr, MD    Date Last Seen by PCP:   Chief Complaint   Patient presents with    Foot Ulcer     right         Current shoe gear:  Affected Foot: Rx diabetic extra depth shoes and custom accommodative insoles     Unaffected Foot: Rx diabetic extra depth shoes and custom accommodative insoles    History of Trauma: negative  Sign of Infection: none    Hemoglobin A1C   Date Value Ref Range Status   09/19/2017 6.7 (H) 4.0 - 5.6 % Final     Comment:     According to ADA guidelines, hemoglobin A1c <7.0% represents  optimal control in non-pregnant diabetic patients. Different  metrics may apply to specific patient populations.   Standards of Medical Care in  Diabetes-2016.  For the purpose of screening for the presence of diabetes:  <5.7%     Consistent with the absence of diabetes  5.7-6.4%  Consistent with increasing risk for diabetes   (prediabetes)  >or=6.5%  Consistent with diabetes  Currently, no consensus exists for use of hemoglobin A1c  for diagnosis of diabetes for children.  This Hemoglobin A1c assay has significant interference with fetal   hemoglobin   (HbF). The results are invalid for patients with abnormal amounts of   HbF,   including those with known Hereditary Persistence   of Fetal Hemoglobin. Heterozygous hemoglobin variants (HbAS, HbAC,   HbAD, HbAE, HbA2) do not significantly interfere with this assay;   however, presence of multiple variants in a sample may impact the %   interference.     12/15/2014 4.7 4.5 - 6.2 % Final   06/17/2013 6.8 (H) 4.8 - 5.6 % Final     Comment:              Increased risk for diabetes: 5.7 - 6.4           Diabetes: >6.4           Glycemic control for adults with diabetes: <7.0  **In order to standardize %HbA1c results worldwide, as of October 11, 2010,  the %HbA1c is being calculated using the master equation recommended in the  consensus statement adopted by the ADA (American Diabetes Assoc), EASD  (European Assoc for the Study of Diabetes), IFCC (International Federation  of Clinical Chemistry and Laboratory Medicine) and IDF (International  Diabetes Federation). Result units: %HgbA1c (DCCT/NGSP).  In common with other methods, Hb A1C values may not accurately reflect mean  blood glucose in patients with hemoglobin variants (HgbF, HgbS and HgbC).  Any cause of shortened erythrocyte survival will reduce exposure of  erythrocytes to glucose with a consequent decrease in HbA1c (%) values, even  though the time-averaged blood glucose level may be elevated. Causes of  shortened erythrocyte lifetime might be hemolytic anemia or other hemolytic  diseases, homozygous sickle cell trait, pregnancy, recent significant  or  chronic blood loss, etc. Caution should be used when interpreting the HbA1c  results from patients with these conditions.   07/05/2012 5.7 4.0 - 6.2 % Final       Review of Systems   Constitution: Negative for chills, fever, malaise/fatigue and night sweats.   Cardiovascular: Negative for claudication, cyanosis and leg swelling.   Skin: Positive for nail changes and poor wound healing. Negative for color change, itching, rash and suspicious lesions.   Musculoskeletal: Negative for falls, gout, joint pain and myalgias.   Neurological: Positive for numbness, paresthesias and sensory change.           Objective:      Physical Exam   Constitutional: He appears well-developed and well-nourished. He is cooperative. No distress.   Cardiovascular:   Pulses:       Dorsalis pedis pulses are 1+ on the right side, and 1+ on the left side.        Posterior tibial pulses are 1+ on the right side, and 1+ on the left side.   Toes warm, pink, cap fill <5 seconds.   Musculoskeletal:   Hallux valgus and flexor toe deformities both feet without evidence of injury or loss of function.     All ten toes without clubbing, cyanosis, or signs of ischemia.  No pain to palpation bilateral lower extremities.  Range of motion, stability, muscle strength, and muscle tone normal bilateral feet and legs.     Lymphadenopathy:   Negative lymphadenopathy bilateral popliteal fossa and tarsal tunnel.  Negative streaking both feet.   Neurological: He has normal strength. He displays no tremor. A sensory deficit is present. He exhibits normal muscle tone.   Diminished/loss of protective sensation all toes bilateral to 10 gram monofilament.    Paresthesias, and burning bilateral feet with no clearly identified trigger or source.    Diminished/loss of protective sensation all toes bilateral to 10 gram monofilament.     Skin: Skin is warm and dry. No abrasion, no bruising, no burn, no ecchymosis, no laceration, no lesion and no rash noted. He is not  diaphoretic. No erythema. No pallor.   Wound:  Plantar right 1st mtpj    Size:    Pre:[id number] - undermined macerated skin  Post: [id number]    Base:  Granular, pink with moderate serous/serosanaguinous drainage only.  No pus, tracking, fluctuance, malodor, or cardinal signs infection.    Borders:  Hyperkeratotic, debriding to flat, pink, blanchable skin edges without undermining.    Otherwise, Skin thin, shiny, atrophic, with decreased density and distribution of pedal hair bilateral, but without hyperpigmentation, minerva discoloration,  ulcers, masses, nodules or cords palpated bilateral feet and legs.                 Assessment:       Encounter Diagnoses   Name Primary?    Diabetic polyneuropathy associated with type 2 diabetes mellitus Yes    Peripheral vascular disease     Skin ulcer of right foot with fat layer exposed          Plan:       Richard was seen today for foot ulcer.    Diagnoses and all orders for this visit:    Diabetic polyneuropathy associated with type 2 diabetes mellitus    Peripheral vascular disease    Skin ulcer of right foot with fat layer exposed         I counseled the patient on his conditions, their implications and medical management.    xrays right foot.    Debrided wound.    Cover with nicole, aperture, davy foam, kerlix - changing every other day.    Dispense another sx shoe.  Minimal ambulation in sx shoe right, DM shoe custom insert left.                  Follow-up in about 1 week (around 4/30/2018).

## 2018-04-23 NOTE — PROGRESS NOTES
Daughter confirmed dose, stating she prepares medication dispenser every 2d and patient lost his med dispenser this week.  Missed Saturday dose, possibly other missed doses.  Daughter states she will administer meds qd.  Patient lives in his home with 5 other people.  Instructed daughter on strict adherence of dose schedule and call cc with missed doses.  Instructed Citlali, daughter on dose and recheck date.  Verbalizes understanding.

## 2018-04-25 DIAGNOSIS — E11.8 TYPE 2 DIABETES MELLITUS WITH COMPLICATION, WITHOUT LONG-TERM CURRENT USE OF INSULIN: Primary | ICD-10-CM

## 2018-04-28 ENCOUNTER — HOSPITAL ENCOUNTER (EMERGENCY)
Facility: HOSPITAL | Age: 73
Discharge: HOME | End: 2018-04-28
Attending: EMERGENCY MEDICINE
Payer: MEDICARE

## 2018-04-28 VITALS
TEMPERATURE: 98 F | RESPIRATION RATE: 16 BRPM | OXYGEN SATURATION: 96 % | DIASTOLIC BLOOD PRESSURE: 78 MMHG | WEIGHT: 302.5 LBS | BODY MASS INDEX: 43.31 KG/M2 | SYSTOLIC BLOOD PRESSURE: 155 MMHG | HEART RATE: 53 BPM | HEIGHT: 70 IN
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
## 2018-04-28 DIAGNOSIS — S01.112A LACERATION OF LEFT EYEBROW, INITIAL ENCOUNTER: ICD-10-CM

## 2018-04-28 DIAGNOSIS — T45.515A WARFARIN-INDUCED COAGULOPATHY: ICD-10-CM

## 2018-04-28 DIAGNOSIS — D68.32 WARFARIN-INDUCED COAGULOPATHY: ICD-10-CM

## 2018-04-28 DIAGNOSIS — S00.83XA CONTUSION OF FOREHEAD, INITIAL ENCOUNTER: Primary | ICD-10-CM

## 2018-04-28 LAB
BASOPHILS # BLD AUTO: 0 K/UL
BASOPHILS NFR BLD: 0.2 %
DIFFERENTIAL METHOD: ABNORMAL
EOSINOPHIL # BLD AUTO: 0.1 K/UL
EOSINOPHIL NFR BLD: 2 %
ERYTHROCYTE [DISTWIDTH] IN BLOOD BY AUTOMATED COUNT: 14.5 %
HCT VFR BLD AUTO: 32.5 %
HGB BLD-MCNC: 10.8 G/DL
INR PPP: 1.9
LYMPHOCYTES # BLD AUTO: 0.6 K/UL
LYMPHOCYTES NFR BLD: 8.7 %
MCH RBC QN AUTO: 33.4 PG
MCHC RBC AUTO-ENTMCNC: 33 G/DL
MCV RBC AUTO: 101 FL
MONOCYTES # BLD AUTO: 0.3 K/UL
MONOCYTES NFR BLD: 4.3 %
NEUTROPHILS # BLD AUTO: 6 K/UL
NEUTROPHILS NFR BLD: 84.8 %
PLATELET # BLD AUTO: 188 K/UL
PMV BLD AUTO: 7.8 FL
PROTHROMBIN TIME: 19.3 SEC
RBC # BLD AUTO: 3.22 M/UL
WBC # BLD AUTO: 7.1 K/UL

## 2018-04-28 PROCEDURE — 12052 INTMD RPR FACE/MM 2.6-5.0 CM: CPT | Mod: LT

## 2018-04-28 PROCEDURE — 99284 EMERGENCY DEPT VISIT MOD MDM: CPT | Mod: 25

## 2018-04-28 PROCEDURE — 85025 COMPLETE CBC W/AUTO DIFF WBC: CPT

## 2018-04-28 PROCEDURE — 36415 COLL VENOUS BLD VENIPUNCTURE: CPT

## 2018-04-28 PROCEDURE — 25000003 PHARM REV CODE 250: Performed by: EMERGENCY MEDICINE

## 2018-04-28 PROCEDURE — 85610 PROTHROMBIN TIME: CPT

## 2018-04-28 RX ORDER — LIDOCAINE HCL/EPINEPHRINE/PF 2%-1:200K
10 VIAL (ML) INJECTION ONCE
Status: COMPLETED | OUTPATIENT
Start: 2018-04-28 | End: 2018-04-28

## 2018-04-28 RX ADMIN — LIDOCAINE HYDROCHLORIDE,EPINEPHRINE BITARTRATE 10 ML: 20; .005 INJECTION, SOLUTION EPIDURAL; INFILTRATION; INTRACAUDAL; PERINEURAL at 09:04

## 2018-04-28 NOTE — ED PROVIDER NOTES
Encounter Date: 4/28/2018    SCRIBE #1 NOTE: Deidre RIOS, telma scribing for, and in the presence of, Dr. Bocanegra.       History     Chief Complaint   Patient presents with    Fall     trip and fall outside hitting forehead on concrete. No loc. Small lac       04/28/2018 7:52 AM     Chief complaint: Head laceration      Richard Bustos is a 72 y.o. male who presents to the ED with concerns of a head laceration and right knee pain s/p a mechanical fall. The patient endorses tripping and hitting his head on concrete idania, but denies LOC and dizziness prior and after falling. He is currently on Coumadin, Plavix, and ASA. Per wife, the patient has a clotting disorder. The patient denies abdominal pain and back pain. He has no other medical concerns or complaints at the moment and denies onset of any other new symptoms. PMHx includes CKD, CAD, HTN, MI, CHF, hypercoagulable state, A-fib, aorta aneurysm, and DM. SHx includes stent, IVC filter retrieval, cardiac surgery, colonoscopy, and abdominal surgery.       The history is provided by the patient and the spouse.     Review of patient's allergies indicates:   Allergen Reactions    Shellfish containing products Other (See Comments)     Other reaction(s): Gout     Past Medical History:   Diagnosis Date    Anemia     Anemia of other chronic disease 9/13/2017    Anemia, chronic renal failure 9/13/2017    Anemia, unspecified 9/13/2017    Anticoagulant long-term use     Aorta aneurysm     Arthritis     Atrial fibrillation     CAD (coronary artery disease)     CHF (congestive heart failure)     Chronic kidney disease     Clotting disorder 9/13/2017    Colon polyp     Diabetes mellitus     not on meds    Diabetes mellitus type II     Diverticulosis     Elevated PSA     Encounter for blood transfusion     Former smoker     GERD (gastroesophageal reflux disease)     Gout     Hx of colonic polyps     Hypercoagulable state     Hyperlipidemia      Hypertension     MI, old 2010    Myocardial infarction     9/2010    Prostate cancer 06/2016    Sleep apnea     Venous stasis     Chronic     Past Surgical History:   Procedure Laterality Date    ABDOMINAL SURGERY      CARDIAC SURGERY      COLONOSCOPY  02/2011    diverticulosis with diverticula with clot, no active bleeding    COLONOSCOPY N/A 8/24/2016    Procedure: COLONOSCOPY;  Surgeon: Saroj Borjas MD;  Location: [facility];  Service: Endoscopy;  Laterality: N/A;    GASTRIC BYPASS  2/5/2008    IVC FILTER RETRIEVAL      JOINT REPLACEMENT  1996 and 2001    bi-lat hip replacement/Rt Hip and Lt Hip    ROTATOR CUFF REPAIR      Rt shoulder    Stents  8/18/2010    x 3    UPPER GASTROINTESTINAL ENDOSCOPY  02/2011     Family History   Problem Relation Age of Onset    Heart attack Mother     Heart attack Father     Heart attack Brother     Ulcerative colitis Daughter 35    Lupus Daughter     Colon cancer Neg Hx     Colon polyps Neg Hx     Crohn's disease Neg Hx     Melanoma Neg Hx     Psoriasis Neg Hx     Eczema Neg Hx      Social History   Substance Use Topics    Smoking status: Former Smoker    Smokeless tobacco: Never Used      Comment: 45 yrs ago, only smoked 3-4 packs in his entire life as a teenager    Alcohol use Yes      Comment: rare     Review of Systems   Constitutional: Negative for fever.   HENT: Negative for congestion.    Respiratory: Negative for shortness of breath.    Cardiovascular: Negative for chest pain.   Gastrointestinal: Negative for abdominal distention, abdominal pain, nausea and vomiting.   Genitourinary: Negative for flank pain.   Musculoskeletal: Positive for arthralgias (right knee). Negative for back pain, gait problem and myalgias.   Skin: Positive for wound (head; right knee).   Neurological: Negative for dizziness, seizures, syncope and weakness.   Psychiatric/Behavioral: Negative for confusion.       Physical Exam     Initial Vitals   BP Pulse Resp Temp  SpO2   04/28/18 0738 04/28/18 0742 04/28/18 0738 04/28/18 0729 04/28/18 0742   134/65 (!) 50 16 97.5 °F (36.4 °C) 95 %      MAP       04/28/18 0738       88         Physical Exam    Nursing note and vitals reviewed.  Constitutional: He appears well-developed and well-nourished.   HENT:   Head: Normocephalic and atraumatic.   4.6 cm laceration over left eyebrow with mild swelling.    Eyes: Conjunctivae and EOM are normal. Pupils are equal, round, and reactive to light.   Neck: Normal range of motion. Neck supple.   Cardiovascular: Normal rate, regular rhythm and normal heart sounds. Exam reveals no gallop and no friction rub.    No murmur heard.  Pulmonary/Chest: Breath sounds normal. No respiratory distress. He has no wheezes. He has no rhonchi. He has no rales.   Abdominal: Soft.   Musculoskeletal: Normal range of motion. He exhibits tenderness.   Tenderness of left patella without swelling. Abrasion over left patella.    Neurological: He is alert and oriented to person, place, and time.   Skin: Skin is warm and dry.   Psychiatric: He has a normal mood and affect.         ED Course   Lac Repair  Date/Time: 4/28/2018 10:29 AM  Performed by: LEN LUCERO  Authorized by: ALEN WHITE III   Body area: head/neck  Location details: left eyebrow  Laceration length: 4.6 cm  Foreign bodies: no foreign bodies  Anesthesia: local infiltration    Anesthesia:  Local Anesthetic: lidocaine 1% with epinephrine  Anesthetic total: 2 mL  Patient sedated: no  Preparation: Patient was prepped and draped in the usual sterile fashion.  Irrigation solution: saline  Irrigation method: syringe  Amount of cleaning: extensive  Wound skin closure material used: 5-0 vicryl rapide   Number of sutures: 5  Technique: simple  Approximation: close  Approximation difficulty: simple  Dressing: antibiotic ointment and non-stick sterile dressing  Patient tolerance: Patient tolerated the procedure well with no immediate  complications        Labs Reviewed   PROTIME-INR - Abnormal; Notable for the following:        Result Value    Prothrombin Time 19.3 (*)     INR 1.9 (*)     All other components within normal limits   CBC W/ AUTO DIFFERENTIAL - Abnormal; Notable for the following:     RBC 3.22 (*)     Hemoglobin 10.8 (*)     Hematocrit 32.5 (*)      (*)     MCH 33.4 (*)     MPV 7.8 (*)     Lymph # 0.6 (*)     Gran% 84.8 (*)     Lymph% 8.7 (*)     All other components within normal limits     Imaging Results          CT Head Without Contrast (Final result)  Result time 04/28/18 09:29:27    Final result by Marley Gutierrez MD (04/28/18 09:29:27)                 Impression:      No acute intracranial abnormality is seen.    Mild generalized cerebral atrophy and nonspecific, chronic supratentorial white matter disease.      Electronically signed by: Marley Gutierrez MD  Date:    04/28/2018  Time:    09:29             Narrative:    EXAMINATION:  CT HEAD WITHOUT CONTRAST    CLINICAL HISTORY:  fall; Unspecified fall, initial encounter    TECHNIQUE:  Low dose axial images were obtained through the head.  Coronal and sagittal reformations were also performed. Contrast was not administered.    COMPARISON:  None.    FINDINGS:  No acute intracranial hemorrhage is seen.  No extra-axial fluid collection, mass effect or midline shift is seen.  There is mild diffuse sulcal prominence consistent with atrophy.  Mild periventricular low-attenuation white matter changes are identified in the supratentorial brain.  There is mild motion artifact on the examination.  No hyperdense vessel is seen.  No definite localizing signs of acute ischemia noted.  There is atherosclerosis in the anterior and posterior circulations.    Bony calvarium is intact.  Paranasal sinuses and mastoid air cells are clear.                                    Medical Decision Making:   History:   Old Medical Records: I decided to obtain old medical records.  Clinical  Tests:   Lab Tests: Reviewed and Ordered  Radiological Study: Reviewed and Ordered            Scribe Attestation:   Scribe #1: I performed the above scribed service and the documentation accurately describes the services I performed. I attest to the accuracy of the note.               Clinical Impression:   The encounter diagnosis was Fall.                           Loida Martines PA-C  04/28/18 1030

## 2018-04-30 ENCOUNTER — ANTI-COAG VISIT (OUTPATIENT)
Dept: CARDIOLOGY | Facility: CLINIC | Age: 73
End: 2018-04-30

## 2018-04-30 DIAGNOSIS — Z79.01 LONG TERM (CURRENT) USE OF ANTICOAGULANTS: ICD-10-CM

## 2018-04-30 DIAGNOSIS — D68.59 HYPERCOAGULABLE STATE: ICD-10-CM

## 2018-04-30 NOTE — PROGRESS NOTES
Per Citlali, daughter, patient tripped on grass in [facility] crack early Saturday am.  [facility], [place] ER visit with forehead sutures, negative head ct, and no new meds.  Daughter states patient to outpatient lab this afternoon.

## 2018-05-01 NOTE — PROGRESS NOTES
5/1/18  LVM for Citlali, daughter with [facility] [place]/18 INR result, dosing and to call back to confirm understanding.  Recheck lab appt scheduled at [facility] for 5/17/18.  If there is a need, please reschedule recheck at Lab at daughter's request.

## 2018-05-01 NOTE — PROGRESS NOTES
5/1/18 Received today's INR result from [facility].  Instructed daughter to maintain dose and recheck 5/22/18 at [facility]. Daughter verbalizes understanding.

## 2018-05-03 ENCOUNTER — OFFICE VISIT (OUTPATIENT)
Dept: PODIATRY | Facility: CLINIC | Age: 73
End: 2018-05-03
Payer: MEDICARE

## 2018-05-03 VITALS — HEIGHT: 70 IN | WEIGHT: 302 LBS | BODY MASS INDEX: 43.23 KG/M2

## 2018-05-03 DIAGNOSIS — E11.42 DIABETIC POLYNEUROPATHY ASSOCIATED WITH TYPE 2 DIABETES MELLITUS: ICD-10-CM

## 2018-05-03 DIAGNOSIS — I73.9 PERIPHERAL VASCULAR DISEASE: ICD-10-CM

## 2018-05-03 DIAGNOSIS — L97.512 SKIN ULCER OF RIGHT FOOT WITH FAT LAYER EXPOSED: Primary | ICD-10-CM

## 2018-05-03 PROCEDURE — 99499 UNLISTED E&M SERVICE: CPT | Mod: S$GLB,,, | Performed by: PODIATRIST

## 2018-05-03 PROCEDURE — 11042 DBRDMT SUBQ TIS 1ST 20SQCM/<: CPT | Mod: S$GLB,,, | Performed by: PODIATRIST

## 2018-05-03 PROCEDURE — 99999 PR PBB SHADOW E&M-EST. PATIENT-LVL III: CPT | Mod: PBBFAC,,, | Performed by: PODIATRIST

## 2018-05-03 NOTE — PROGRESS NOTES
Subjective:      Patient ID: Richard Bustos is a 72 y.o. male.    Chief Complaint: Foot Ulcer (right foot)    Richard is a 72 y.o. male who presents to the clinic for evaluation and treatment of high risk feet. Richard has a past medical history of Anemia; Anemia of other chronic disease (9/13/2017); Anemia, chronic renal failure (9/13/2017); Anemia, unspecified (9/13/2017); Anticoagulant long-term use; Aorta aneurysm; Arthritis; Atrial fibrillation; CAD (coronary artery disease); CHF (congestive heart failure); Chronic kidney disease; Clotting disorder (9/13/2017); Colon polyp; Diabetes mellitus; Diabetes mellitus type II; Diverticulosis; Elevated PSA; Encounter for blood transfusion; Former smoker; GERD (gastroesophageal reflux disease); Gout; colonic polyps; Hypercoagulable state; Hyperlipidemia; Hypertension; MI, old (2010); Myocardial infarction; Prostate cancer (06/2016); Sleep apnea; and Venous stasis. The patient's chief complaint is diabetic foot ulcer, bottom right forefoot  Dressed with gauze pad/kerlix today. CDI.  Ambulating minimally in surgical shoe right and DM shoe insert  left.   This patient has documented high risk feet requiring routine maintenance secondary to diabetes mellitis and those secondary complications of diabetes, as mentioned..    Did not get Xrays Rx last visits 4/16/18.          PCP: Familia Ramirez Jr, MD    Date Last Seen by PCP:   Chief Complaint   Patient presents with    Foot Ulcer     right foot         Current shoe gear:  Affected Foot: Rx diabetic extra depth shoes and custom accommodative insoles     Unaffected Foot: Rx diabetic extra depth shoes and custom accommodative insoles    History of Trauma: negative  Sign of Infection: none    Hemoglobin A1C   Date Value Ref Range Status   09/19/2017 6.7 (H) 4.0 - 5.6 % Final     Comment:     According to ADA guidelines, hemoglobin A1c <7.0% represents  optimal control in non-pregnant diabetic patients. Different  metrics may apply to  specific patient populations.   Standards of Medical Care in Diabetes-2016.  For the purpose of screening for the presence of diabetes:  <5.7%     Consistent with the absence of diabetes  5.7-6.4%  Consistent with increasing risk for diabetes   (prediabetes)  >or=6.5%  Consistent with diabetes  Currently, no consensus exists for use of hemoglobin A1c  for diagnosis of diabetes for children.  This Hemoglobin A1c assay has significant interference with fetal   hemoglobin   (HbF). The results are invalid for patients with abnormal amounts of   HbF,   including those with known Hereditary Persistence   of Fetal Hemoglobin. Heterozygous hemoglobin variants (HbAS, HbAC,   HbAD, HbAE, HbA2) do not significantly interfere with this assay;   however, presence of multiple variants in a sample may impact the %   interference.     12/15/2014 4.7 4.5 - 6.2 % Final   06/17/2013 6.8 (H) 4.8 - 5.6 % Final     Comment:              Increased risk for diabetes: 5.7 - 6.4           Diabetes: >6.4           Glycemic control for adults with diabetes: <7.0  **In order to standardize %HbA1c results worldwide, as of October 11, 2010,  the %HbA1c is being calculated using the master equation recommended in the  consensus statement adopted by the ADA (American Diabetes Assoc), EASD  (European Assoc for the Study of Diabetes), IFCC (International Federation  of Clinical Chemistry and Laboratory Medicine) and IDF (International  Diabetes Federation). Result units: %HgbA1c (DCCT/NGSP).  In common with other methods, Hb A1C values may not accurately reflect mean  blood glucose in patients with hemoglobin variants (HgbF, HgbS and HgbC).  Any cause of shortened erythrocyte survival will reduce exposure of  erythrocytes to glucose with a consequent decrease in HbA1c (%) values, even  though the time-averaged blood glucose level may be elevated. Causes of  shortened erythrocyte lifetime might be hemolytic anemia or other hemolytic  diseases,  homozygous sickle cell trait, pregnancy, recent significant or  chronic blood loss, etc. Caution should be used when interpreting the HbA1c  results from patients with these conditions.   07/05/2012 5.7 4.0 - 6.2 % Final       Review of Systems   Constitution: Negative for chills, fever, malaise/fatigue and night sweats.   Cardiovascular: Negative for claudication, cyanosis and leg swelling.   Skin: Positive for nail changes and poor wound healing. Negative for color change, itching, rash and suspicious lesions.   Musculoskeletal: Negative for falls, gout, joint pain and myalgias.   Neurological: Positive for numbness, paresthesias and sensory change.           Objective:      Physical Exam   Constitutional: He appears well-developed and well-nourished. He is cooperative. No distress.   Cardiovascular:   Pulses:       Dorsalis pedis pulses are 1+ on the right side, and 1+ on the left side.        Posterior tibial pulses are 1+ on the right side, and 1+ on the left side.   Toes warm, pink, cap fill <5 seconds.   Musculoskeletal:   Hallux valgus and flexor toe deformities both feet without evidence of injury or loss of function.     All ten toes without clubbing, cyanosis, or signs of ischemia.  No pain to palpation bilateral lower extremities.  Range of motion, stability, muscle strength, and muscle tone normal bilateral feet and legs.     Lymphadenopathy:   Negative lymphadenopathy bilateral popliteal fossa and tarsal tunnel.  Negative streaking both feet.   Neurological: He has normal strength. He displays no tremor. A sensory deficit is present. He exhibits normal muscle tone.   Diminished/loss of protective sensation all toes bilateral to 10 gram monofilament.    Paresthesias, and burning bilateral feet with no clearly identified trigger or source.    Diminished/loss of protective sensation all toes bilateral to 10 gram monofilament.     Skin: Skin is warm and dry. No abrasion, no bruising, no burn, no  ecchymosis, no laceration, no lesion and no rash noted. He is not diaphoretic. No erythema. No pallor.   Wound:  Plantar right 1st mtpj    Size:    Pre:[id number] - undermined macerated skin  Post: [id number]    Base:  Granular, pink with moderate serous/serosanaguinous drainage only.  No pus, tracking, fluctuance, malodor, or cardinal signs infection.    Borders:  Hyperkeratotic, debriding to flat, pink, blanchable skin edges without undermining.    Otherwise, Skin thin, shiny, atrophic, with decreased density and distribution of pedal hair bilateral, but without hyperpigmentation, minerva discoloration,  ulcers, masses, nodules or cords palpated bilateral feet and legs.                 Assessment:       Encounter Diagnoses   Name Primary?    Skin ulcer of right foot with fat layer exposed Yes    Peripheral vascular disease     Diabetic polyneuropathy associated with type 2 diabetes mellitus          Plan:       Richard was seen today for foot ulcer.    Diagnoses and all orders for this visit:    Skin ulcer of right foot with fat layer exposed    Peripheral vascular disease    Diabetic polyneuropathy associated with type 2 diabetes mellitus         I counseled the patient on his conditions, their implications and medical management.    Debrided wound.    Cover with nicole, aperture x 2, davy foam, kerlix - changing every other day.    Dispense another sx shoe.  Minimal ambulation in sx shoe right, DM shoe custom insert left.                  Follow-up in about 1 week (around 5/10/2018).

## 2018-05-03 NOTE — PROCEDURES
"Wound Debridement  Date/Time: 5/3/2018 9:07 AM  Performed by: ALBINO LARSON  Authorized by: ALBINO LARSON     Time out: Immediately prior to procedure a "time out" was called to verify the correct patient, procedure, equipment, support staff and site/side marked as required.    Consent Done?:  Yes (Verbal)  Local anesthesia used?: No      Wound Details:    Location:  Right foot    Location:  Right 1st Metatarsal Head    Type of Debridement:  Excisional       Length (cm):  0.8       Area (sq cm):  0.4       Width (cm):  0.5       Percent Debrided (%):  100       Depth (cm):  0.3       Total Area Debrided (sq cm):  0.4    Depth of debridement:  Subcutaneous tissue    Tissue debrided:  Dermis, Epidermis and Subcutaneous    Devitalized tissue debrided:  Callus, Biofilm and Sough    Instruments:  Blade and Curette    Bleeding:  Minimal  Hemostasis Achieved: Yes    Method Used:  Pressure  Patient tolerance:  Patient tolerated the procedure well with no immediate complications      "

## 2018-05-07 ENCOUNTER — DOCUMENTATION ONLY (OUTPATIENT)
Dept: FAMILY MEDICINE | Facility: CLINIC | Age: 73
End: 2018-05-07

## 2018-05-07 NOTE — PROGRESS NOTES
Pre-Visit Chart Review  For Appointment Scheduled on (date) 5/12/18    Health Maintenance Due   Topic Date Due    Eye Exam  09/04/1955    Hemoglobin A1c  03/19/2018

## 2018-05-08 ENCOUNTER — HOSPITAL ENCOUNTER (OUTPATIENT)
Dept: RADIOLOGY | Facility: HOSPITAL | Age: 73
Discharge: HOME | End: 2018-05-08
Payer: MEDICARE

## 2018-05-08 DIAGNOSIS — N18.9 ANEMIA OF CHRONIC RENAL FAILURE: ICD-10-CM

## 2018-05-08 DIAGNOSIS — D63.1 ANEMIA OF CHRONIC RENAL FAILURE: ICD-10-CM

## 2018-05-08 PROCEDURE — 78071 PARATHYRD PLANAR W/WO SUBTRJ: CPT | Mod: 26,,, | Performed by: RADIOLOGY

## 2018-05-08 PROCEDURE — A9500 TC99M SESTAMIBI: HCPCS

## 2018-05-09 RX ORDER — ALENDRONATE SODIUM AND CHOLECALCIFEROL 70; 2800 MG/1; [IU]/1
1 TABLET ORAL
Qty: 4 TABLET | Refills: 0 | Status: SHIPPED | OUTPATIENT
Start: 2018-05-09 | End: 2018-06-06 | Stop reason: SDUPTHER

## 2018-05-10 ENCOUNTER — OFFICE VISIT (OUTPATIENT)
Dept: PODIATRY | Facility: CLINIC | Age: 73
End: 2018-05-10
Payer: MEDICARE

## 2018-05-10 VITALS — RESPIRATION RATE: 15 BRPM | BODY MASS INDEX: 43.43 KG/M2 | HEIGHT: 70 IN | WEIGHT: 303.38 LBS

## 2018-05-10 DIAGNOSIS — I73.9 PERIPHERAL VASCULAR DISEASE: ICD-10-CM

## 2018-05-10 DIAGNOSIS — E11.42 DIABETIC POLYNEUROPATHY ASSOCIATED WITH TYPE 2 DIABETES MELLITUS: ICD-10-CM

## 2018-05-10 DIAGNOSIS — L97.512 SKIN ULCER OF RIGHT FOOT WITH FAT LAYER EXPOSED: Primary | ICD-10-CM

## 2018-05-10 PROCEDURE — 11042 DBRDMT SUBQ TIS 1ST 20SQCM/<: CPT | Mod: S$GLB,,, | Performed by: PODIATRIST

## 2018-05-10 PROCEDURE — 99499 UNLISTED E&M SERVICE: CPT | Mod: S$GLB,,, | Performed by: PODIATRIST

## 2018-05-10 PROCEDURE — 99999 PR PBB SHADOW E&M-EST. PATIENT-LVL III: CPT | Mod: PBBFAC,,, | Performed by: PODIATRIST

## 2018-05-10 NOTE — PROCEDURES
"Wound Debridement  Date/Time: 5/10/2018 9:52 AM  Performed by: ALBINO LARSON  Authorized by: ALBINO LARSON     Time out: Immediately prior to procedure a "time out" was called to verify the correct patient, procedure, equipment, support staff and site/side marked as required.    Consent Done?:  Yes (Verbal)  Local anesthesia used?: No      Wound Details:    Location:  Right foot    Location:  Right 1st Metatarsal Head    Type of Debridement:  Excisional       Length (cm):  0.4       Area (sq cm):  0.16       Width (cm):  0.4       Percent Debrided (%):  100       Depth (cm):  0.2       Total Area Debrided (sq cm):  0.16    Depth of debridement:  Subcutaneous tissue    Tissue debrided:  Dermis, Epidermis and Subcutaneous    Devitalized tissue debrided:  Biofilm, Callus and Sough    Instruments:  Blade    Bleeding:  Minimal  Hemostasis Achieved: Yes    Method Used:  Pressure  Patient tolerance:  Patient tolerated the procedure well with no immediate complications      "

## 2018-05-10 NOTE — PROGRESS NOTES
Subjective:      Patient ID: Richard Bustos is a 72 y.o. male.    Chief Complaint: Foot Ulcer (right foot )    Richard is a 72 y.o. male who presents to the clinic for evaluation and treatment of high risk feet. Richard has a past medical history of Anemia; Anemia of other chronic disease (9/13/2017); Anemia, chronic renal failure (9/13/2017); Anemia, unspecified (9/13/2017); Anticoagulant long-term use; Aorta aneurysm; Arthritis; Atrial fibrillation; CAD (coronary artery disease); CHF (congestive heart failure); Chronic kidney disease; Clotting disorder (9/13/2017); Colon polyp; Diabetes mellitus; Diabetes mellitus type II; Diverticulosis; Elevated PSA; Encounter for blood transfusion; Former smoker; GERD (gastroesophageal reflux disease); Gout; colonic polyps; Hypercoagulable state; Hyperlipidemia; Hypertension; MI, old (2010); Myocardial infarction; Prostate cancer (06/2016); Sleep apnea; and Venous stasis. The patient's chief complaint is diabetic foot ulcer, bottom right forefoot  Dressed with same dressing applied last week - dirty, but. CDI.  Ambulating minimally in surgical shoe right and DM shoe insert  left.   This patient has documented high risk feet requiring routine maintenance secondary to diabetes mellitis and those secondary complications of diabetes, as mentioned..    Did not get Xrays Rx last visits 4/16/18.          PCP: Familia Ramirez Jr, MD    Date Last Seen by PCP:   Chief Complaint   Patient presents with    Foot Ulcer     right foot          Current shoe gear:  Affected Foot: Rx diabetic extra depth shoes and custom accommodative insoles     Unaffected Foot: Rx diabetic extra depth shoes and custom accommodative insoles    History of Trauma: negative  Sign of Infection: none    Hemoglobin A1C   Date Value Ref Range Status   09/19/2017 6.7 (H) 4.0 - 5.6 % Final     Comment:     According to ADA guidelines, hemoglobin A1c <7.0% represents  optimal control in non-pregnant diabetic patients.  Different  metrics may apply to specific patient populations.   Standards of Medical Care in Diabetes-2016.  For the purpose of screening for the presence of diabetes:  <5.7%     Consistent with the absence of diabetes  5.7-6.4%  Consistent with increasing risk for diabetes   (prediabetes)  >or=6.5%  Consistent with diabetes  Currently, no consensus exists for use of hemoglobin A1c  for diagnosis of diabetes for children.  This Hemoglobin A1c assay has significant interference with fetal   hemoglobin   (HbF). The results are invalid for patients with abnormal amounts of   HbF,   including those with known Hereditary Persistence   of Fetal Hemoglobin. Heterozygous hemoglobin variants (HbAS, HbAC,   HbAD, HbAE, HbA2) do not significantly interfere with this assay;   however, presence of multiple variants in a sample may impact the %   interference.     12/15/2014 4.7 4.5 - 6.2 % Final   06/17/2013 6.8 (H) 4.8 - 5.6 % Final     Comment:              Increased risk for diabetes: 5.7 - 6.4           Diabetes: >6.4           Glycemic control for adults with diabetes: <7.0  **In order to standardize %HbA1c results worldwide, as of October 11, 2010,  the %HbA1c is being calculated using the master equation recommended in the  consensus statement adopted by the ADA (American Diabetes Assoc), EASD  (European Assoc for the Study of Diabetes), IFCC (International Federation  of Clinical Chemistry and Laboratory Medicine) and IDF (International  Diabetes Federation). Result units: %HgbA1c (DCCT/NGSP).  In common with other methods, Hb A1C values may not accurately reflect mean  blood glucose in patients with hemoglobin variants (HgbF, HgbS and HgbC).  Any cause of shortened erythrocyte survival will reduce exposure of  erythrocytes to glucose with a consequent decrease in HbA1c (%) values, even  though the time-averaged blood glucose level may be elevated. Causes of  shortened erythrocyte lifetime might be hemolytic anemia or  other hemolytic  diseases, homozygous sickle cell trait, pregnancy, recent significant or  chronic blood loss, etc. Caution should be used when interpreting the HbA1c  results from patients with these conditions.   07/05/2012 5.7 4.0 - 6.2 % Final       Review of Systems   Constitution: Negative for chills, fever, malaise/fatigue and night sweats.   Cardiovascular: Positive for leg swelling. Negative for claudication and cyanosis.   Skin: Positive for nail changes and poor wound healing. Negative for color change, itching, rash and suspicious lesions.   Musculoskeletal: Negative for falls, gout, joint pain and myalgias.   Neurological: Positive for numbness, paresthesias and sensory change.           Objective:      Physical Exam   Constitutional: He is oriented to person, place, and time. He appears well-developed and well-nourished. He is cooperative. No distress.   Cardiovascular:   Pulses:       Popliteal pulses are 2+ on the right side, and 2+ on the left side.        Dorsalis pedis pulses are 1+ on the right side, and 1+ on the left side.        Posterior tibial pulses are 1+ on the right side, and 1+ on the left side.   Toes warm, pink, cap fill <5 seconds.   Musculoskeletal:        Right ankle: He exhibits normal range of motion, no swelling, no ecchymosis, no deformity, no laceration and normal pulse. Achilles tendon normal. Achilles tendon exhibits no pain, no defect and normal Chung's test results.   Hallux valgus and flexor toe deformities both feet without evidence of injury or loss of function.     All ten toes without clubbing, cyanosis, or signs of ischemia.  No pain to palpation bilateral lower extremities.  Range of motion, stability, muscle strength, and muscle tone normal bilateral feet and legs.     Lymphadenopathy: No inguinal adenopathy noted on the right or left side.   Negative lymphadenopathy bilateral popliteal fossa and tarsal tunnel.  Negative streaking both feet.   Neurological: He  is alert and oriented to person, place, and time. He has normal strength. He displays no atrophy and no tremor. A sensory deficit is present. He exhibits normal muscle tone. Gait normal.   Reflex Scores:       Patellar reflexes are 2+ on the right side and 2+ on the left side.       Achilles reflexes are 2+ on the right side and 2+ on the left side.  Diminished/loss of protective sensation all toes bilateral to 10 gram monofilament.    Paresthesias, and burning bilateral feet with no clearly identified trigger or source.    Diminished/loss of protective sensation all toes bilateral to 10 gram monofilament.     Skin: Skin is warm, dry and intact. Capillary refill takes 2 to 3 seconds. No abrasion, no bruising, no burn, no ecchymosis, no laceration, no lesion and no rash noted. He is not diaphoretic. No cyanosis or erythema. No pallor. Nails show no clubbing.   Wound:  Plantar right 1st mtpj    Size:    Pre:[id number] - undermined macerated skin  Post: [id number]    Base:  Granular, pink with moderate serous/serosanaguinous drainage only.  No pus, tracking, fluctuance, malodor, or cardinal signs infection.    Borders:  Hyperkeratotic, debriding to flat, pink, blanchable skin edges without undermining.    Otherwise, Skin thin, shiny, atrophic, with decreased density and distribution of pedal hair bilateral, but without hyperpigmentation, minerva discoloration,  ulcers, masses, nodules or cords palpated bilateral feet and legs.       Psychiatric: He has a normal mood and affect.             Assessment:       Encounter Diagnoses   Name Primary?    Skin ulcer of right foot with fat layer exposed Yes    Peripheral vascular disease     Diabetic polyneuropathy associated with type 2 diabetes mellitus          Plan:       Richard was seen today for foot ulcer.    Diagnoses and all orders for this visit:    Skin ulcer of right foot with fat layer exposed    Peripheral vascular disease    Diabetic polyneuropathy associated with type 2  diabetes mellitus         I counseled the patient on his conditions, their implications and medical management.    Debrided wound.    Cover with nicole, aperture x 2, davy foam, kerlix football - do not change.    Dispense another sx shoe.  Minimal ambulation in sx shoe right, DM shoe custom insert left.                  Follow-up in about 1 week (around 5/17/2018).

## 2018-05-12 ENCOUNTER — LAB VISIT (OUTPATIENT)
Dept: LAB | Facility: HOSPITAL | Age: 73
End: 2018-05-12
Attending: FAMILY MEDICINE
Payer: MEDICARE

## 2018-05-12 ENCOUNTER — OFFICE VISIT (OUTPATIENT)
Dept: FAMILY MEDICINE | Facility: CLINIC | Age: 73
End: 2018-05-12
Payer: MEDICARE

## 2018-05-12 VITALS
RESPIRATION RATE: 18 BRPM | HEIGHT: 70 IN | BODY MASS INDEX: 43.21 KG/M2 | HEART RATE: 52 BPM | TEMPERATURE: 97 F | DIASTOLIC BLOOD PRESSURE: 79 MMHG | WEIGHT: 301.81 LBS | SYSTOLIC BLOOD PRESSURE: 141 MMHG

## 2018-05-12 DIAGNOSIS — E11.22 TYPE 2 DIABETES MELLITUS WITH STAGE 3 CHRONIC KIDNEY DISEASE, WITHOUT LONG-TERM CURRENT USE OF INSULIN: Primary | Chronic | ICD-10-CM

## 2018-05-12 DIAGNOSIS — E11.22 TYPE 2 DIABETES MELLITUS WITH STAGE 3 CHRONIC KIDNEY DISEASE, WITHOUT LONG-TERM CURRENT USE OF INSULIN: Chronic | ICD-10-CM

## 2018-05-12 DIAGNOSIS — N18.30 CKD (CHRONIC KIDNEY DISEASE) STAGE 3, GFR 30-59 ML/MIN: ICD-10-CM

## 2018-05-12 DIAGNOSIS — M54.5 CHRONIC LOW BACK PAIN, UNSPECIFIED BACK PAIN LATERALITY, WITH SCIATICA PRESENCE UNSPECIFIED: ICD-10-CM

## 2018-05-12 DIAGNOSIS — D68.59 HYPERCOAGULABLE STATE: ICD-10-CM

## 2018-05-12 DIAGNOSIS — C61 PROSTATE CANCER: ICD-10-CM

## 2018-05-12 DIAGNOSIS — E66.01 MORBID OBESITY: ICD-10-CM

## 2018-05-12 DIAGNOSIS — N18.30 TYPE 2 DIABETES MELLITUS WITH STAGE 3 CHRONIC KIDNEY DISEASE, WITHOUT LONG-TERM CURRENT USE OF INSULIN: Primary | Chronic | ICD-10-CM

## 2018-05-12 DIAGNOSIS — E11.59 HYPERTENSION ASSOCIATED WITH DIABETES: ICD-10-CM

## 2018-05-12 DIAGNOSIS — E78.00 PURE HYPERCHOLESTEROLEMIA: ICD-10-CM

## 2018-05-12 DIAGNOSIS — Z15.89 MTHFR MUTATION (METHYLENETETRAHYDROFOLATE REDUCTASE): ICD-10-CM

## 2018-05-12 DIAGNOSIS — E11.21 TYPE 2 DIABETES MELLITUS WITH DIABETIC NEPHROPATHY, WITHOUT LONG-TERM CURRENT USE OF INSULIN: ICD-10-CM

## 2018-05-12 DIAGNOSIS — K21.9 GASTROESOPHAGEAL REFLUX DISEASE WITHOUT ESOPHAGITIS: ICD-10-CM

## 2018-05-12 DIAGNOSIS — I77.9 CAROTID DISEASE, BILATERAL: Chronic | ICD-10-CM

## 2018-05-12 DIAGNOSIS — E11.42 TYPE 2 DIABETES MELLITUS WITH DIABETIC POLYNEUROPATHY, WITHOUT LONG-TERM CURRENT USE OF INSULIN: ICD-10-CM

## 2018-05-12 DIAGNOSIS — G89.29 CHRONIC LOW BACK PAIN, UNSPECIFIED BACK PAIN LATERALITY, WITH SCIATICA PRESENCE UNSPECIFIED: ICD-10-CM

## 2018-05-12 DIAGNOSIS — F41.9 ANXIETY: ICD-10-CM

## 2018-05-12 DIAGNOSIS — I25.10 CORONARY ARTERY DISEASE INVOLVING NATIVE CORONARY ARTERY OF NATIVE HEART WITHOUT ANGINA PECTORIS: Chronic | ICD-10-CM

## 2018-05-12 DIAGNOSIS — N18.30 TYPE 2 DIABETES MELLITUS WITH STAGE 3 CHRONIC KIDNEY DISEASE, WITHOUT LONG-TERM CURRENT USE OF INSULIN: Chronic | ICD-10-CM

## 2018-05-12 DIAGNOSIS — D63.8 ANEMIA, CHRONIC DISEASE: ICD-10-CM

## 2018-05-12 DIAGNOSIS — I15.2 HYPERTENSION ASSOCIATED WITH DIABETES: ICD-10-CM

## 2018-05-12 DIAGNOSIS — I73.9 PERIPHERAL VASCULAR DISEASE: ICD-10-CM

## 2018-05-12 DIAGNOSIS — R41.3 MEMORY LOSS: ICD-10-CM

## 2018-05-12 LAB
ESTIMATED AVG GLUCOSE: 134 MG/DL
HBA1C MFR BLD HPLC: 6.3 %

## 2018-05-12 PROCEDURE — 3078F DIAST BP <80 MM HG: CPT | Mod: CPTII,S$GLB,, | Performed by: FAMILY MEDICINE

## 2018-05-12 PROCEDURE — 36415 COLL VENOUS BLD VENIPUNCTURE: CPT | Mod: PO

## 2018-05-12 PROCEDURE — 83036 HEMOGLOBIN GLYCOSYLATED A1C: CPT

## 2018-05-12 PROCEDURE — 3044F HG A1C LEVEL LT 7.0%: CPT | Mod: CPTII,S$GLB,, | Performed by: FAMILY MEDICINE

## 2018-05-12 PROCEDURE — 99214 OFFICE O/P EST MOD 30 MIN: CPT | Mod: S$GLB,,, | Performed by: FAMILY MEDICINE

## 2018-05-12 PROCEDURE — 99999 PR PBB SHADOW E&M-EST. PATIENT-LVL IV: CPT | Mod: PBBFAC,,, | Performed by: FAMILY MEDICINE

## 2018-05-12 PROCEDURE — 99499 UNLISTED E&M SERVICE: CPT | Mod: S$GLB,,, | Performed by: FAMILY MEDICINE

## 2018-05-12 PROCEDURE — 3077F SYST BP >= 140 MM HG: CPT | Mod: CPTII,S$GLB,, | Performed by: FAMILY MEDICINE

## 2018-05-12 RX ORDER — ALPRAZOLAM 1 MG/1
TABLET ORAL
Qty: 30 TABLET | Refills: 0 | Status: SHIPPED | OUTPATIENT
Start: 2018-05-12 | End: 2018-06-11 | Stop reason: SDUPTHER

## 2018-05-12 RX ORDER — HYDROCODONE BITARTRATE AND ACETAMINOPHEN 7.5; 325 MG/1; MG/1
1 TABLET ORAL EVERY 6 HOURS PRN
Qty: 90 TABLET | Refills: 0 | Status: SHIPPED | OUTPATIENT
Start: 2018-05-12 | End: 2018-06-11 | Stop reason: SDUPTHER

## 2018-05-15 ENCOUNTER — ANTI-COAG VISIT (OUTPATIENT)
Dept: CARDIOLOGY | Facility: CLINIC | Age: 73
End: 2018-05-15

## 2018-05-15 DIAGNOSIS — Z79.01 LONG TERM (CURRENT) USE OF ANTICOAGULANTS: ICD-10-CM

## 2018-05-15 DIAGNOSIS — D68.59 HYPERCOAGULABLE STATE: ICD-10-CM

## 2018-05-17 ENCOUNTER — OFFICE VISIT (OUTPATIENT)
Dept: PODIATRY | Facility: CLINIC | Age: 73
End: 2018-05-17
Payer: MEDICARE

## 2018-05-17 VITALS
DIASTOLIC BLOOD PRESSURE: 63 MMHG | HEART RATE: 61 BPM | WEIGHT: 301 LBS | BODY MASS INDEX: 43.09 KG/M2 | HEIGHT: 70 IN | SYSTOLIC BLOOD PRESSURE: 111 MMHG

## 2018-05-17 DIAGNOSIS — I73.9 PERIPHERAL VASCULAR DISEASE: ICD-10-CM

## 2018-05-17 DIAGNOSIS — E11.42 DIABETIC POLYNEUROPATHY ASSOCIATED WITH TYPE 2 DIABETES MELLITUS: ICD-10-CM

## 2018-05-17 DIAGNOSIS — L97.512 SKIN ULCER OF RIGHT FOOT WITH FAT LAYER EXPOSED: Primary | ICD-10-CM

## 2018-05-17 PROCEDURE — 99999 PR PBB SHADOW E&M-EST. PATIENT-LVL III: CPT | Mod: PBBFAC,,, | Performed by: PODIATRIST

## 2018-05-17 PROCEDURE — 11042 DBRDMT SUBQ TIS 1ST 20SQCM/<: CPT | Mod: S$GLB,,, | Performed by: PODIATRIST

## 2018-05-17 PROCEDURE — 99499 UNLISTED E&M SERVICE: CPT | Mod: S$GLB,,, | Performed by: PODIATRIST

## 2018-05-17 NOTE — PROGRESS NOTES
Subjective:      Patient ID: Richard Bustos is a 72 y.o. male.    Chief Complaint: Foot Ulcer (right)    Richard is a 72 y.o. male who presents to the clinic for evaluation and treatment of high risk feet. Richard has a past medical history of Anemia; Anemia of other chronic disease (9/13/2017); Anemia, chronic renal failure (9/13/2017); Anemia, unspecified (9/13/2017); Anticoagulant long-term use; Aorta aneurysm; Arthritis; Atrial fibrillation; CAD (coronary artery disease); CHF (congestive heart failure); Chronic kidney disease; Clotting disorder (9/13/2017); Colon polyp; Diabetes mellitus; Diabetes mellitus type II; Diverticulosis; Elevated PSA; Encounter for blood transfusion; Former smoker; GERD (gastroesophageal reflux disease); Gout; colonic polyps; Hypercoagulable state; Hyperlipidemia; Hypertension; MI, old (2010); Myocardial infarction; Prostate cancer (06/2016); Sleep apnea; and Venous stasis. The patient's chief complaint is diabetic foot ulcer, bottom right forefoot  Dressed with same dressing applied last week - dirty, but. CDI.  Ambulating minimally in surgical shoe right and DM shoe insert  left.   This patient has documented high risk feet requiring routine maintenance secondary to diabetes mellitis and those secondary complications of diabetes, as mentioned..    Did not get Xrays Rx last visits 4/16/18.          PCP: Familia Ramirez Jr, MD    Date Last Seen by PCP:   Chief Complaint   Patient presents with    Foot Ulcer     right         Current shoe gear:  Affected Foot: Rx diabetic extra depth shoes and custom accommodative insoles     Unaffected Foot: Rx diabetic extra depth shoes and custom accommodative insoles    History of Trauma: negative  Sign of Infection: none    Hemoglobin A1C   Date Value Ref Range Status   05/12/2018 6.3 (H) 4.0 - 5.6 % Final     Comment:     According to ADA guidelines, hemoglobin A1c <7.0% represents  optimal control in non-pregnant diabetic patients. Different  metrics  may apply to specific patient populations.   Standards of Medical Care in Diabetes-2016.  For the purpose of screening for the presence of diabetes:  <5.7%     Consistent with the absence of diabetes  5.7-6.4%  Consistent with increasing risk for diabetes   (prediabetes)  >or=6.5%  Consistent with diabetes  Currently, no consensus exists for use of hemoglobin A1c  for diagnosis of diabetes for children.  This Hemoglobin A1c assay has significant interference with fetal   hemoglobin   (HbF). The results are invalid for patients with abnormal amounts of   HbF,   including those with known Hereditary Persistence   of Fetal Hemoglobin. Heterozygous hemoglobin variants (HbAS, HbAC,   HbAD, HbAE, HbA2) do not significantly interfere with this assay;   however, presence of multiple variants in a sample may impact the %   interference.     09/19/2017 6.7 (H) 4.0 - 5.6 % Final     Comment:     According to ADA guidelines, hemoglobin A1c <7.0% represents  optimal control in non-pregnant diabetic patients. Different  metrics may apply to specific patient populations.   Standards of Medical Care in Diabetes-2016.  For the purpose of screening for the presence of diabetes:  <5.7%     Consistent with the absence of diabetes  5.7-6.4%  Consistent with increasing risk for diabetes   (prediabetes)  >or=6.5%  Consistent with diabetes  Currently, no consensus exists for use of hemoglobin A1c  for diagnosis of diabetes for children.  This Hemoglobin A1c assay has significant interference with fetal   hemoglobin   (HbF). The results are invalid for patients with abnormal amounts of   HbF,   including those with known Hereditary Persistence   of Fetal Hemoglobin. Heterozygous hemoglobin variants (HbAS, HbAC,   HbAD, HbAE, HbA2) do not significantly interfere with this assay;   however, presence of multiple variants in a sample may impact the %   interference.     12/15/2014 4.7 4.5 - 6.2 % Final   06/17/2013 6.8 (H) 4.8 - 5.6 % Final      Comment:              Increased risk for diabetes: 5.7 - 6.4           Diabetes: >6.4           Glycemic control for adults with diabetes: <7.0  **In order to standardize %HbA1c results worldwide, as of October 11, 2010,  the %HbA1c is being calculated using the master equation recommended in the  consensus statement adopted by the ADA (American Diabetes Assoc), EASD  (European Assoc for the Study of Diabetes), IFCC (International Federation  of Clinical Chemistry and Laboratory Medicine) and IDF (International  Diabetes Federation). Result units: %HgbA1c (DCCT/NGSP).  In common with other methods, Hb A1C values may not accurately reflect mean  blood glucose in patients with hemoglobin variants (HgbF, HgbS and HgbC).  Any cause of shortened erythrocyte survival will reduce exposure of  erythrocytes to glucose with a consequent decrease in HbA1c (%) values, even  though the time-averaged blood glucose level may be elevated. Causes of  shortened erythrocyte lifetime might be hemolytic anemia or other hemolytic  diseases, homozygous sickle cell trait, pregnancy, recent significant or  chronic blood loss, etc. Caution should be used when interpreting the HbA1c  results from patients with these conditions.       Review of Systems   Constitution: Negative for chills, fever, malaise/fatigue and night sweats.   Cardiovascular: Positive for leg swelling. Negative for claudication and cyanosis.   Skin: Positive for nail changes and poor wound healing. Negative for color change, itching, rash and suspicious lesions.   Musculoskeletal: Negative for falls, gout, joint pain and myalgias.   Neurological: Positive for numbness, paresthesias and sensory change.           Objective:      Physical Exam   Constitutional: He is oriented to person, place, and time. He appears well-developed and well-nourished. He is cooperative. No distress.   Cardiovascular:   Pulses:       Popliteal pulses are 2+ on the right side, and 2+ on the left  side.        Dorsalis pedis pulses are 1+ on the right side, and 1+ on the left side.        Posterior tibial pulses are 1+ on the right side, and 1+ on the left side.   Toes warm, pink, cap fill <5 seconds.   Musculoskeletal:        Right ankle: He exhibits normal range of motion, no swelling, no ecchymosis, no deformity, no laceration and normal pulse. Achilles tendon normal. Achilles tendon exhibits no pain, no defect and normal Chung's test results.   Hallux valgus and flexor toe deformities both feet without evidence of injury or loss of function.     All ten toes without clubbing, cyanosis, or signs of ischemia.  No pain to palpation bilateral lower extremities.  Range of motion, stability, muscle strength, and muscle tone normal bilateral feet and legs.     Lymphadenopathy: No inguinal adenopathy noted on the right or left side.   Negative lymphadenopathy bilateral popliteal fossa and tarsal tunnel.  Negative streaking both feet.   Neurological: He is alert and oriented to person, place, and time. He has normal strength. He displays no atrophy and no tremor. A sensory deficit is present. He exhibits normal muscle tone. Gait normal.   Reflex Scores:       Patellar reflexes are 2+ on the right side and 2+ on the left side.       Achilles reflexes are 2+ on the right side and 2+ on the left side.  Diminished/loss of protective sensation all toes bilateral to 10 gram monofilament.    Paresthesias, and burning bilateral feet with no clearly identified trigger or source.    Diminished/loss of protective sensation all toes bilateral to 10 gram monofilament.     Skin: Skin is warm, dry and intact. Capillary refill takes 2 to 3 seconds. No abrasion, no bruising, no burn, no ecchymosis, no laceration, no lesion and no rash noted. He is not diaphoretic. No cyanosis or erythema. No pallor. Nails show no clubbing.   Wound:  Plantar right 1st mtpj    Size:    Pre:[id number] - undermined macerated skin  Post:  [id number]    Base:  Granular, pink with moderate serous/serosanaguinous drainage only.  No pus, tracking, fluctuance, malodor, or cardinal signs infection.    Borders:  Hyperkeratotic, debriding to flat, pink, blanchable skin edges without undermining.    Otherwise, Skin thin, shiny, atrophic, with decreased density and distribution of pedal hair bilateral, but without hyperpigmentation, minerva discoloration,  ulcers, masses, nodules or cords palpated bilateral feet and legs.       Psychiatric: He has a normal mood and affect.             Assessment:       Encounter Diagnoses   Name Primary?    Skin ulcer of right foot with fat layer exposed Yes    Peripheral vascular disease     Diabetic polyneuropathy associated with type 2 diabetes mellitus          Plan:       Richard was seen today for foot ulcer.    Diagnoses and all orders for this visit:    Skin ulcer of right foot with fat layer exposed    Peripheral vascular disease    Diabetic polyneuropathy associated with type 2 diabetes mellitus         I counseled the patient on his conditions, their implications and medical management.    Debrided wound.    Cover with nicole, aperture x 2, davy foam, kerlix football - do not change.    Dispense another sx shoe.  Minimal ambulation in sx shoe right, DM shoe custom insert left.    Discussed other advanced wound treatments:  HBOT, TCC, growth factors, biologic dressings/grafts, surgical offloading - declined presently pending improvement.              Follow-up in about 1 week (around 5/24/2018).

## 2018-05-17 NOTE — PROCEDURES
"Wound Debridement  Date/Time: 5/17/2018 10:20 AM  Performed by: ALBINO LARSON  Authorized by: ALBINO LARSON     Time out: Immediately prior to procedure a "time out" was called to verify the correct patient, procedure, equipment, support staff and site/side marked as required.    Consent Done?:  Yes (Verbal)  Local anesthesia used?: No      Wound Details:    Location:  Right foot    Location:  Right 1st Metatarsal Head    Type of Debridement:  Excisional       Length (cm):  0.5       Area (sq cm):  0.2       Width (cm):  0.4       Percent Debrided (%):  100       Depth (cm):  0.2       Total Area Debrided (sq cm):  0.2    Depth of debridement:  Subcutaneous tissue    Tissue debrided:  Dermis, Epidermis and Subcutaneous    Devitalized tissue debrided:  Biofilm and Callus    Instruments:  Blade    Bleeding:  Minimal  Hemostasis Achieved: Yes    Method Used:  Pressure  Patient tolerance:  Patient tolerated the procedure well with no immediate complications      "

## 2018-05-20 NOTE — PROGRESS NOTES
Subjective:       Patient ID: Richard Bustos is a 72 y.o. male.    Chief Complaint: Diabetes; Hypertension; Hyperlipidemia; Coronary Artery Disease; Gastroesophageal Reflux; Chronic back pain; Prostate Cancer; and Hypercoagulable state    Patient presents here for six-month follow-up of diabetes, hypertension, hyperlipidemia, CAD, hypercoagulable state, GERD, and chronic kidney disease stage III.  He also has chronic back pain as well as chronic anxiety.  His diabetes has been well controlled on his present medication.  His last hemoglobin A1c was 6.7% in September 2017.  He is presently on diet control alone.  He does have diabetic neuropathy as well as diabetic nephropathy.  He is overdue for his eye exam and he was encouraged to get this done.  His hypertension is well controlled on his present medication and he is following his diabetic, low-sodium, low-fat low-cholesterol diet fairly well.  His hyperlipidemia is controlled also on his present dose of atorvastatin.  He is tolerating the medication well.  His coronary artery disease is stable without any recent chest pains or palpitations.  He is followed by cardiology on a regular basis.  He does have GERD which is controlled with his present dose of omeprazole 20 mg daily.  He also avoids any foods that aggravate his GERD.  As far as his hypercoagulable state, he is on Coumadin chronically and is followed by the Coumadin clinic for his INR.  He has had no problems with bleeding or excessive bruising.  He does have chronic kidney disease stage III which is stable and is being treated with adequate hydration as well as continuing to control his blood pressure aggressively.  As far as his chronic back pain, he does continue to receive hydrocodone 7.5 mg to use every 6-8 hours as needed.  He has not been overusing his medications and there is no evidence of diversion.  He also has chronic anxiety and takes Xanax on a regular basis.  As far as screening, he is  up-to-date with all of his recommended screening except that he needs to update his eye exam.      Diabetes   He presents for his follow-up diabetic visit. He has type 2 diabetes mellitus. His disease course has been stable. Hypoglycemia symptoms include nervousness/anxiousness. Pertinent negatives for hypoglycemia include no dizziness or headaches. Associated symptoms include foot paresthesias. Pertinent negatives for diabetes include no chest pain, no fatigue, no polydipsia and no polyuria. There are no hypoglycemic complications. Symptoms are stable. Diabetic complications include heart disease, nephropathy and peripheral neuropathy. Pertinent negatives for diabetic complications include no CVA. Risk factors for coronary artery disease include diabetes mellitus, dyslipidemia, family history, hypertension, male sex, obesity and sedentary lifestyle. Current diabetic treatment includes insulin injections. He is compliant with treatment most of the time. His weight is stable. He is following a diabetic, low fat/cholesterol and low salt diet. Meal planning includes avoidance of concentrated sweets. He has had a previous visit with a dietitian. He rarely participates in exercise. There is no change in his home blood glucose trend. His breakfast blood glucose is taken between 7-8 am. His breakfast blood glucose range is generally 110-130 mg/dl. His overall blood glucose range is 110-130 mg/dl. He sees a podiatrist.Eye exam is current.   Hypertension   This is a chronic problem. The problem is unchanged. The problem is controlled. Associated symptoms include neck pain. Pertinent negatives include no chest pain, headaches, palpitations or shortness of breath. Risk factors for coronary artery disease include diabetes mellitus, dyslipidemia, family history, male gender, obesity and sedentary lifestyle. Past treatments include beta blockers and ACE inhibitors. The current treatment provides moderate improvement. Compliance  problems include exercise.  Hypertensive end-organ damage includes kidney disease and CAD/MI. There is no history of CVA or heart failure.   Hyperlipidemia   This is a chronic problem. The problem is controlled. Recent lipid tests were reviewed and are normal. Pertinent negatives include no chest pain or shortness of breath. Current antihyperlipidemic treatment includes statins. The current treatment provides moderate improvement of lipids. Compliance problems include adherence to exercise.  Risk factors for coronary artery disease include diabetes mellitus, dyslipidemia, family history, hypertension, male sex, obesity and a sedentary lifestyle.   Coronary Artery Disease   Presents for follow-up visit. Symptoms include leg swelling (chronic). Pertinent negatives include no chest pain, chest tightness, dizziness, palpitations or shortness of breath. Risk factors include hyperlipidemia. The symptoms have been stable. Compliance with diet is good. Compliance with exercise is poor. Compliance with medications is good.     Review of Systems   Constitutional: Negative for chills, fatigue, fever and unexpected weight change.   HENT: Negative for congestion, ear pain, postnasal drip and sore throat.    Eyes: Negative for pain and visual disturbance.        Needs to update eye exam   Respiratory: Negative for cough, chest tightness and shortness of breath.    Cardiovascular: Positive for leg swelling (chronic). Negative for chest pain and palpitations.   Gastrointestinal: Negative for abdominal pain, constipation, diarrhea, nausea and vomiting.   Endocrine: Negative for polydipsia and polyuria.   Genitourinary: Negative for difficulty urinating, dysuria, flank pain and frequency.        Nocturia times once per night   Musculoskeletal: Positive for back pain and neck pain. Negative for arthralgias.   Neurological: Negative for dizziness, syncope, light-headedness and headaches.   Hematological: Negative for adenopathy.  Bruises/bleeds easily (due to Coumadin).   Psychiatric/Behavioral: Positive for sleep disturbance. The patient is nervous/anxious.        Objective:      Physical Exam   Constitutional: He is oriented to person, place, and time.   Morbidly obese male in no distress at this time   HENT:   Head: Normocephalic and atraumatic.   Right Ear: External ear normal.   Left Ear: External ear normal.   Nose: Nose normal.   Mouth/Throat: Oropharynx is clear and moist.   Neck: Normal range of motion. Neck supple. No thyromegaly present.   Cardiovascular: Normal rate, regular rhythm, normal heart sounds and intact distal pulses.    No murmur heard.  Pulmonary/Chest: Effort normal and breath sounds normal. He has no wheezes. He has no rales.   Genitourinary:   Genitourinary Comments: Done per urology; followed for prostate cancer   Musculoskeletal: Normal range of motion. He exhibits edema.   Lymphadenopathy:     He has no cervical adenopathy.   Neurological: He is alert and oriented to person, place, and time. He has normal reflexes. No cranial nerve deficit.   Skin: Skin is warm and dry. No rash noted. No erythema.   Abrasion left knee and left arm; healing laceration left forehead from which sutures were removed this visit   Psychiatric: He has a normal mood and affect. His behavior is normal.   Vitals reviewed.      Assessment:       1. Type 2 diabetes mellitus with stage 3 chronic kidney disease, without long-term current use of insulin    2. Anxiety    3. Chronic low back pain, unspecified back pain laterality, with sciatica presence unspecified    4. Memory loss    5. Coronary artery disease involving native coronary artery of native heart without angina pectoris    6. Carotid disease, bilateral    7. MTHFR mutation (methylenetetrahydrofolate reductase)    8. Hypertension associated with diabetes    9. Pure hypercholesterolemia    10. Gastroesophageal reflux disease without esophagitis    11. Hypercoagulable state,  Prothrombin mutation    12. Morbid obesity, today BMI 44.6    13. CKD (chronic kidney disease) stage 3, GFR 30-59 ml/min, 41    14. Type 2 diabetes mellitus with diabetic polyneuropathy, without long-term current use of insulin    15. Type 2 diabetes mellitus with diabetic nephropathy, without long-term current use of insulin    16. Prostate cancer    17. Peripheral vascular disease    18. Anemia, chronic disease        Plan:       1.  Hemoglobin A1c today  2.  Referral to neurology per the family's request as they are concerned about some of his memory loss and possible Alzheimer's dementia  3.  Sutures removed from his laceration of the left forehead today  4.  Continue diabetic, low-sodium, low-fat low-cholesterol diet  5.  Increase amount of exercise weight loss  6.  Patient is to update his eye exam  7.  Follow-up with me in 6 months and with the [facility] in 3 months due to his chronic narcotic therapy        Patient readiness: acceptance and barriers:social stressors    During the course of the visit the patient was educated and counseled about the following:     Diabetes:  Addressed ADA diet.  Suggested low cholesterol diet.  Encouraged aerobic exercise.  Discussed foot care.  Reminded to get yearly retinal exam.  Discussed ways to avoid symptomatic hypoglycemia.  Discussed sick day management.  Reminded to bring in blood sugar diary at next visit.  Follow up in 6 months or as needed.  Hypertension:   Dietary sodium restriction.  Regular aerobic exercise.  Follow up: 6 months and as needed.  Obesity:   General weight loss/lifestyle modification strategies discussed (elicit support from others; identify saboteurs; non-food rewards, etc).  Diet interventions: moderate (500 kCal/d) deficit diet.  Informal exercise measures discussed, e.g. taking stairs instead of elevator.    Goals: Diabetes: Maintain Hemoglobin A1C below 7, Hypertension: Reduce Blood Pressure and Obesity: Reduce calorie intake and BMI    Did patient  meet goals/outcomes: Yes    The following self management tools provided: blood pressure log  blood glucose log    Patient Instructions (the written plan) was given to the patient/family.     Time spent with patient: 45 minutes    Barriers to medications present (no )    Adverse reactions to current medications (no)    Over the counter medications reviewed (Yes)

## 2018-05-23 ENCOUNTER — OFFICE VISIT (OUTPATIENT)
Dept: RADIATION ONCOLOGY | Facility: CLINIC | Age: 73
End: 2018-05-23
Payer: MEDICARE

## 2018-05-23 VITALS — BODY MASS INDEX: 43.96 KG/M2 | WEIGHT: 302 LBS

## 2018-05-23 DIAGNOSIS — C61 PROSTATE CANCER: Primary | ICD-10-CM

## 2018-05-23 PROCEDURE — 99213 OFFICE O/P EST LOW 20 MIN: CPT | Mod: ,,, | Performed by: RADIOLOGY

## 2018-05-23 NOTE — PROGRESS NOTES
Richard Bustos  [id number]  1945 5/23/2018  No referring provider defined for this encounter.    DIAGNOSIS:   No diagnosis found.  REASON FOR VISIT: Routine scheduled follow-up.    HISTORY OF PRESENT ILLNESS:   Pt w high risk prosate ca  Genie 8 psa 7 sp xrtv 8/16 to 75.6 Gy.  Last psa in 6/16 at [facility].  Pt on ADT    INTERVAL HISTORY:   Today patient denies dysuria, hematuria, incontinence. No diarrhea or bright red blood per rectum, no bone pain. Patient recently had a fall after tripping over his feet at the foot clinic but elsewise is without complaints. He sees Dr. Robbins q6mos and remains on ADT. He does report urgency occasional requiring him to wear pads.    AUA 12  QOL 2  IIEF na    Review of Systems   Constitutional: Negative for appetite change, chills, fever and unexpected weight change.   HENT:   Negative for hearing loss, lump/mass, mouth sores, nosebleeds, sore throat, trouble swallowing and voice change.    Eyes: Negative for eye problems and icterus.   Respiratory: Negative for chest tightness, cough, hemoptysis and shortness of breath.    Cardiovascular: Positive for leg swelling. Negative for chest pain.   Gastrointestinal: Negative for abdominal distention, abdominal pain, blood in stool, constipation, diarrhea, nausea and vomiting.   Genitourinary: Positive for frequency. Negative for bladder incontinence, difficulty urinating, dysuria, hematuria and nocturia.    Musculoskeletal: Positive for gait problem and myalgias. Negative for back pain, neck pain and neck stiffness.   Skin: Negative for itching and rash.   Neurological: Positive for gait problem. Negative for extremity weakness, headaches, numbness and seizures.   Hematological: Negative for adenopathy. Bruises/bleeds easily.   Psychiatric/Behavioral: Negative for depression. The patient is not nervous/anxious.      Past Medical History:   Diagnosis Date    Anemia     Anemia of other chronic disease 9/13/2017    Anemia,  chronic renal failure 9/13/2017    Anemia, unspecified 9/13/2017    Anticoagulant long-term use     Aorta aneurysm     Arthritis     Atrial fibrillation     CAD (coronary artery disease)     CHF (congestive heart failure)     Chronic kidney disease     Clotting disorder 9/13/2017    Colon polyp     Diabetes mellitus     not on meds    Diabetes mellitus type II     Diverticulosis     Elevated PSA     Encounter for blood transfusion     Former smoker     GERD (gastroesophageal reflux disease)     Gout     Hx of colonic polyps     Hypercoagulable state     Hyperlipidemia     Hypertension     MI, old 2010    Myocardial infarction     9/2010    Prostate cancer 06/2016    Sleep apnea     Venous stasis     Chronic     Past Surgical History:   Procedure Laterality Date    ABDOMINAL SURGERY      CARDIAC SURGERY      COLONOSCOPY  02/2011    diverticulosis with diverticula with clot, no active bleeding    COLONOSCOPY N/A 8/24/2016    Procedure: COLONOSCOPY;  Surgeon: Saroj Borjas MD;  Location: [facility] END[facility];  Service: Endoscopy;  Laterality: N/A;    GASTRIC BYPASS  2/5/2008    IVC FILTER RETRIEVAL      JOINT REPLACEMENT  1996 and 2001    bi-lat hip replacement/Rt Hip and Lt Hip    ROTATOR CUFF REPAIR      Rt shoulder    Stents  8/18/2010    x 3    UPPER GASTROINTESTINAL ENDOSCOPY  02/2011     Social History     Social History    Marital status:      Spouse name: N/A    Number of children: N/A    Years of education: N/A     Social History Main Topics    Smoking status: Former Smoker    Smokeless tobacco: Never Used      Comment: 45 yrs ago, only smoked 3-4 packs in his entire life as a teenager    Alcohol use Yes      Comment: rare    Drug use: No    Sexual activity: Not Asked     Other Topics Concern    None     Social History Narrative    None     Family History   Problem Relation Age of Onset    Heart attack Mother     Heart attack Father     Heart attack Brother      Ulcerative colitis Daughter 35    Lupus Daughter     Colon cancer Neg Hx     Colon polyps Neg Hx     Crohn's disease Neg Hx     Melanoma Neg Hx     Psoriasis Neg Hx     Eczema Neg Hx      Medication List with Changes/Refills   Current Medications    ALENDRONATE-VITAMIN D3 (FOSAMAX PLUS D) 70 MG- 2,800 UNIT PER TABLET    Take 1 tablet by mouth every 7 days.    ALLOPURINOL (ZYLOPRIM) 100 MG TABLET    TAKE 1 TABLET(100 MG) BY MOUTH EVERY DAY    ALPRAZOLAM (XANAX) 1 MG TABLET    TAKE 1 TABLET(1 MG) BY MOUTH EVERY EVENING    ASPIRIN (ECOTRIN) 81 MG EC TABLET    Take 81 mg by mouth once daily.    ATORVASTATIN (LIPITOR) 80 MG TABLET    TAKE 1 TABLET(80 MG) BY MOUTH EVERY DAY    CALCITRIOL (ROCALTROL) 0.25 MCG CAP    Take 0.75 mcg by mouth once daily.     CALCIUM CARBONATE (TUMS ULTRA) 400 MG CALCIUM (1,000 MG) CHEW    Take 1 tablet (400 mg total) by mouth once daily at 6am.    CARVEDILOL (COREG) 25 MG TABLET    TAKE 1 TABLET BY MOUTH TWICE DAILY WITH MEALS    CICLOPIROX (PENLAC) 8 % SOLN    Apply topically nightly.    CLOPIDOGREL (PLAVIX) 75 MG TABLET    Take 1 tablet (75 mg total) by mouth once daily.    CLOTRIMAZOLE-BETAMETHASONE 1-0.05% (LOTRISONE) CREAM    Apply topically 2 (two) times daily. To head of penis    ERGOCALCIFEROL (ERGOCALCIFEROL) 50,000 UNIT CAP    TK 1 C PO Q WEEK    ESCITALOPRAM OXALATE (LEXAPRO) 10 MG TABLET    Take 1/2 tablet (5 mg) daily for first 7 days.  Thereafter take one tablet (10 mg) daily.    FERROUS SULFATE 325 MG (65 MG IRON) TAB TABLET    TAKE 1 TABLET BY MOUTH TWICE DAILY    FLUTICASONE (FLONASE) 50 MCG/ACTUATION NASAL SPRAY    2 sprays by Each Nare route once daily.    FOLIC ACID/MULTIVIT-MIN/LUTEIN (CENTRUM SILVER ORAL)    Take 1 tablet by mouth once daily.    FOSAMAX PLUS D 70 MG- 2,800 UNIT PER TABLET    TAKE 1 TABLET BY MOUTH EVERY 7 DAYS    FUROSEMIDE (LASIX) 40 MG TABLET    TAKE 1 TABLET BY MOUTH DAILY AS NEEDED    HYDROCODONE-ACETAMINOPHEN 7.5-325MG (NORCO) 7.5-325 MG  PER TABLET    Take 1 tablet by mouth every 6 (six) hours as needed for Pain.    IPRATROPIUM (ATROVENT) 0.02 % NEBULIZER SOLUTION    Take 2.5 mLs (500 mcg total) by nebulization 4 (four) times daily. Rescue    L-METHYL-B6-B12 3-35-2 MG TAB    TAKE 1 TABLET BY MOUTH TWICE DAILY    LISINOPRIL (PRINIVIL,ZESTRIL) 40 MG TABLET    TAKE 1 TABLET BY MOUTH EVERY EVENING    MAGOX 400 MG TABLET    TAKE 1 TABLET(400 MG) BY MOUTH EVERY DAY    MIRABEGRON (MYRBETRIQ) 50 MG TB24    Take 1 tablet (50 mg total) by mouth once daily.    MUPIROCIN (BACTROBAN) 2 % OINTMENT    Apply topically 2 (two) times daily.    NITROGLYCERIN 0.4 MG/DOSE TL SPRY (NITROLINGUAL) 400 MCG/SPRAY SPRAY    PLACE 1 SPRAY UNDER THE TONGUE EVERY 5 MINUTES AS NEEDED FOR CHEST PAIN    OMEPRAZOLE (PRILOSEC) 20 MG CAPSULE    TAKE 1 CAPSULE(20 MG) BY MOUTH EVERY DAY    RANITIDINE (ZANTAC) 150 MG TABLET    TAKE 1 TABLET(150 MG) BY MOUTH TWICE DAILY    SALICYLIC ACID (SALACYN) 6 % CREA    Apply 1 application topically 2 (two) times daily.    VIT C-VIT E-LUTEIN-MIN-OM-3 (OCUVITE) 708-34-3-150 MG-UNIT-MG-MG CAP    Take 1 capsule by mouth once daily.    WARFARIN (COUMADIN) 5 MG TABLET    TAKE 1 TABLET BY MOUTH EVERY DAY     Review of patient's allergies indicates:   Allergen Reactions    Shellfish containing products Other (See Comments)     Other reaction(s): Gout       QUALITY OF LIFE: 80%- Normal Activity with Effort: Some Symptoms of Disease    Vitals:    05/23/18 1022   Weight: (!) 137 kg (302 lb)   PainSc: 0-No pain       PHYSICAL EXAM:   GENERAL: alert; in no apparent distress.   HEAD: normocephalic, atraumatic.  EYES: pupils are equal, round, reactive to light and accommodation. Sclera anicteric. Conjunctiva not injected.   NOSE/THROAT: no nasal erythema or rhinorrhea. Oropharynx pink, without erythema, ulcerations or thrush.   NECK: no cervical motion rigidity; supple with no masses.  CHEST:  Patient is speaking comfortably on room air with normal work of  breathing without using accessory muscles of respiration.  ABDOMEN: soft, nontender, nondistended. Bowel sounds present. Morbidly obese  MUSCULOSKELETAL: no tenderness to palpation along the spine or scapulae. Normal range of motion.  NEUROLOGIC: cranial nerves II-XII intact bilaterally. Strength 5/5 in bilateral upper and lower extremities. No sensory deficits appreciated.  Normal gait.  EXTREMITIES: no clubbing, cyanosis, edema.  SKIN: pedctechia, and ecchymosis of L face    ANCILLARY DATA: 1/18 PSA < 0.01    ASSESSMENT: 72-year-old male with high-risk prostate adenocarcinoma status post definitive radiotherapy with androgen deprivation therapy, continue demonstrated good PSA response.  PLAN:  Patient is an updated PSA from January of this year as undetectable. His next PSA is scheduled with his urologist Dr. Moss. Recommend continue trend response given his high-risk status. Elsewise by my review of systems and physical examination today he has no evidence of recurrent disease. PSA continues to be suppressed as does his testosterone level. I believe he will continuing with androgen deprivation therapy x2yrs. return to clinic in 6 months    All questions answered and contact information provided. Patient understands free to call us anytime with any questions or concerns regarding radiation therapy.    TIME SPENT WITH PATIENT: I have personally seen and evaluated this patient. Approximately 30 minutes were spent with the patient discussing follow-up careplan.     PHYSICIAN: Galo Lakhani Jr, MD

## 2018-05-24 ENCOUNTER — OFFICE VISIT (OUTPATIENT)
Dept: PODIATRY | Facility: CLINIC | Age: 73
End: 2018-05-24
Payer: MEDICARE

## 2018-05-24 VITALS — WEIGHT: 302 LBS | BODY MASS INDEX: 44.73 KG/M2 | HEIGHT: 69 IN

## 2018-05-24 DIAGNOSIS — I73.9 PERIPHERAL VASCULAR DISEASE: ICD-10-CM

## 2018-05-24 DIAGNOSIS — L97.512 SKIN ULCER OF RIGHT FOOT WITH FAT LAYER EXPOSED: Primary | ICD-10-CM

## 2018-05-24 DIAGNOSIS — E11.42 DIABETIC POLYNEUROPATHY ASSOCIATED WITH TYPE 2 DIABETES MELLITUS: ICD-10-CM

## 2018-05-24 PROCEDURE — 99999 PR PBB SHADOW E&M-EST. PATIENT-LVL III: CPT | Mod: PBBFAC,,, | Performed by: PODIATRIST

## 2018-05-24 PROCEDURE — 3044F HG A1C LEVEL LT 7.0%: CPT | Mod: CPTII,S$GLB,, | Performed by: PODIATRIST

## 2018-05-24 PROCEDURE — 99212 OFFICE O/P EST SF 10 MIN: CPT | Mod: S$GLB,,, | Performed by: PODIATRIST

## 2018-05-24 NOTE — PROGRESS NOTES
Subjective:      Patient ID: Richard Bustos is a 72 y.o. male.    Chief Complaint: Foot Ulcer (right foot )    Richard is a 72 y.o. male who presents to the clinic for evaluation and treatment of high risk feet. Richard has a past medical history of Anemia; Anemia of other chronic disease (9/13/2017); Anemia, chronic renal failure (9/13/2017); Anemia, unspecified (9/13/2017); Anticoagulant long-term use; Aorta aneurysm; Arthritis; Atrial fibrillation; CAD (coronary artery disease); CHF (congestive heart failure); Chronic kidney disease; Clotting disorder (9/13/2017); Colon polyp; Diabetes mellitus; Diabetes mellitus type II; Diverticulosis; Elevated PSA; Encounter for blood transfusion; Former smoker; GERD (gastroesophageal reflux disease); Gout; colonic polyps; Hypercoagulable state; Hyperlipidemia; Hypertension; MI, old (2010); Myocardial infarction; Prostate cancer (06/2016); Sleep apnea; and Venous stasis. The patient's chief complaint is diabetic foot ulcer, bottom right forefoot  Dressed with football dressing applied last week - dirty, but. CDI.  Ambulating minimally in surgical shoe right and DM shoe insert  left.   This patient has documented high risk feet requiring routine maintenance secondary to diabetes mellitis and those secondary complications of diabetes, as mentioned..    Did not get Xrays Rx last visits 4/16/18.          PCP: Familia Ramirez Jr, MD    Date Last Seen by PCP:   Chief Complaint   Patient presents with    Foot Ulcer     right foot          Current shoe gear:  Affected Foot: Rx diabetic extra depth shoes and custom accommodative insoles     Unaffected Foot: Rx diabetic extra depth shoes and custom accommodative insoles    History of Trauma: negative  Sign of Infection: none    Hemoglobin A1C   Date Value Ref Range Status   05/12/2018 6.3 (H) 4.0 - 5.6 % Final     Comment:     According to ADA guidelines, hemoglobin A1c <7.0% represents  optimal control in non-pregnant diabetic patients.  Different  metrics may apply to specific patient populations.   Standards of Medical Care in Diabetes-2016.  For the purpose of screening for the presence of diabetes:  <5.7%     Consistent with the absence of diabetes  5.7-6.4%  Consistent with increasing risk for diabetes   (prediabetes)  >or=6.5%  Consistent with diabetes  Currently, no consensus exists for use of hemoglobin A1c  for diagnosis of diabetes for children.  This Hemoglobin A1c assay has significant interference with fetal   hemoglobin   (HbF). The results are invalid for patients with abnormal amounts of   HbF,   including those with known Hereditary Persistence   of Fetal Hemoglobin. Heterozygous hemoglobin variants (HbAS, HbAC,   HbAD, HbAE, HbA2) do not significantly interfere with this assay;   however, presence of multiple variants in a sample may impact the %   interference.     09/19/2017 6.7 (H) 4.0 - 5.6 % Final     Comment:     According to ADA guidelines, hemoglobin A1c <7.0% represents  optimal control in non-pregnant diabetic patients. Different  metrics may apply to specific patient populations.   Standards of Medical Care in Diabetes-2016.  For the purpose of screening for the presence of diabetes:  <5.7%     Consistent with the absence of diabetes  5.7-6.4%  Consistent with increasing risk for diabetes   (prediabetes)  >or=6.5%  Consistent with diabetes  Currently, no consensus exists for use of hemoglobin A1c  for diagnosis of diabetes for children.  This Hemoglobin A1c assay has significant interference with fetal   hemoglobin   (HbF). The results are invalid for patients with abnormal amounts of   HbF,   including those with known Hereditary Persistence   of Fetal Hemoglobin. Heterozygous hemoglobin variants (HbAS, HbAC,   HbAD, HbAE, HbA2) do not significantly interfere with this assay;   however, presence of multiple variants in a sample may impact the %   interference.     12/15/2014 4.7 4.5 - 6.2 % Final   06/17/2013 6.8 (H)  4.8 - 5.6 % Final     Comment:              Increased risk for diabetes: 5.7 - 6.4           Diabetes: >6.4           Glycemic control for adults with diabetes: <7.0  **In order to standardize %HbA1c results worldwide, as of October 11, 2010,  the %HbA1c is being calculated using the master equation recommended in the  consensus statement adopted by the ADA (American Diabetes Assoc), EASD  (European Assoc for the Study of Diabetes), IFCC (International Federation  of Clinical Chemistry and Laboratory Medicine) and IDF (International  Diabetes Federation). Result units: %HgbA1c (DCCT/NGSP).  In common with other methods, Hb A1C values may not accurately reflect mean  blood glucose in patients with hemoglobin variants (HgbF, HgbS and HgbC).  Any cause of shortened erythrocyte survival will reduce exposure of  erythrocytes to glucose with a consequent decrease in HbA1c (%) values, even  though the time-averaged blood glucose level may be elevated. Causes of  shortened erythrocyte lifetime might be hemolytic anemia or other hemolytic  diseases, homozygous sickle cell trait, pregnancy, recent significant or  chronic blood loss, etc. Caution should be used when interpreting the HbA1c  results from patients with these conditions.       Review of Systems   Constitution: Negative for chills, fever, malaise/fatigue and night sweats.   Cardiovascular: Positive for leg swelling. Negative for claudication and cyanosis.   Skin: Positive for nail changes and poor wound healing. Negative for color change, itching, rash and suspicious lesions.   Musculoskeletal: Negative for falls, gout, joint pain and myalgias.   Neurological: Positive for numbness, paresthesias and sensory change.           Objective:      Physical Exam   Constitutional: He is oriented to person, place, and time. He appears well-developed and well-nourished. He is cooperative. No distress.   Cardiovascular:   Pulses:       Popliteal pulses are 2+ on the right  side, and 2+ on the left side.        Dorsalis pedis pulses are 1+ on the right side, and 1+ on the left side.        Posterior tibial pulses are 1+ on the right side, and 1+ on the left side.   Toes warm, pink, cap fill <5 seconds.   Musculoskeletal:        Right ankle: He exhibits normal range of motion, no swelling, no ecchymosis, no deformity, no laceration and normal pulse. Achilles tendon normal. Achilles tendon exhibits no pain, no defect and normal Chung's test results.   Hallux valgus and flexor toe deformities both feet without evidence of injury or loss of function.     All ten toes without clubbing, cyanosis, or signs of ischemia.  No pain to palpation bilateral lower extremities.  Range of motion, stability, muscle strength, and muscle tone normal bilateral feet and legs.     Lymphadenopathy: No inguinal adenopathy noted on the right or left side.   Negative lymphadenopathy bilateral popliteal fossa and tarsal tunnel.  Negative streaking both feet.   Neurological: He is alert and oriented to person, place, and time. He has normal strength. He displays no atrophy and no tremor. A sensory deficit is present. He exhibits normal muscle tone. Gait normal.   Reflex Scores:       Patellar reflexes are 2+ on the right side and 2+ on the left side.       Achilles reflexes are 2+ on the right side and 2+ on the left side.  Diminished/loss of protective sensation all toes bilateral to 10 gram monofilament.    Paresthesias, and burning bilateral feet with no clearly identified trigger or source.    Diminished/loss of protective sensation all toes bilateral to 10 gram monofilament.     Skin: Skin is warm, dry and intact. Capillary refill takes 2 to 3 seconds. No abrasion, no bruising, no burn, no ecchymosis, no laceration, no lesion and no rash noted. He is not diaphoretic. No cyanosis or erythema. No pallor. Nails show no clubbing.   Wound:  Plantar right 1st mtpj    Size:    Pre: [id number]    Base:  Granular,  pink with very minimal serous/serosanaguinous drainage only.  No pus, tracking, fluctuance, malodor, or cardinal signs infection.    Borders:   flat, pink, blanchable skin edges without undermining.    Otherwise, Skin thin, shiny, atrophic, with decreased density and distribution of pedal hair bilateral, but without hyperpigmentation, minerva discoloration,  ulcers, masses, nodules or cords palpated bilateral feet and legs.       Psychiatric: He has a normal mood and affect.             Assessment:       Encounter Diagnoses   Name Primary?    Skin ulcer of right foot with fat layer exposed Yes    Peripheral vascular disease     Diabetic polyneuropathy associated with type 2 diabetes mellitus          Plan:       Richard was seen today for foot ulcer.    Diagnoses and all orders for this visit:    Skin ulcer of right foot with fat layer exposed    Peripheral vascular disease    Diabetic polyneuropathy associated with type 2 diabetes mellitus         I counseled the patient on his conditions, their implications and medical management.    Debrided wound.    Cover with  aperture x 2, davy foam, kerlix football - do not change.    Dispense another sx shoe.  Minimal ambulation in sx shoe right, DM shoe custom insert left.    Discussed other advanced wound treatments:  HBOT, TCC, growth factors, biologic dressings/grafts, surgical offloading - declined presently pending improvement.              Follow-up in about 1 week (around 5/31/2018).

## 2018-06-01 ENCOUNTER — OFFICE VISIT (OUTPATIENT)
Dept: PODIATRY | Facility: CLINIC | Age: 73
End: 2018-06-01
Payer: MEDICARE

## 2018-06-01 VITALS — BODY MASS INDEX: 43.85 KG/M2 | HEIGHT: 69 IN | WEIGHT: 296.06 LBS

## 2018-06-01 DIAGNOSIS — I73.9 PERIPHERAL VASCULAR DISEASE: ICD-10-CM

## 2018-06-01 DIAGNOSIS — L97.512 SKIN ULCER OF RIGHT FOOT WITH FAT LAYER EXPOSED: Primary | ICD-10-CM

## 2018-06-01 DIAGNOSIS — E11.42 DIABETIC POLYNEUROPATHY ASSOCIATED WITH TYPE 2 DIABETES MELLITUS: ICD-10-CM

## 2018-06-01 PROCEDURE — 11042 DBRDMT SUBQ TIS 1ST 20SQCM/<: CPT | Mod: S$GLB,,, | Performed by: PODIATRIST

## 2018-06-01 PROCEDURE — 99999 PR PBB SHADOW E&M-EST. PATIENT-LVL V: CPT | Mod: PBBFAC,,, | Performed by: PODIATRIST

## 2018-06-01 PROCEDURE — 3044F HG A1C LEVEL LT 7.0%: CPT | Mod: CPTII,S$GLB,, | Performed by: PODIATRIST

## 2018-06-01 PROCEDURE — 99213 OFFICE O/P EST LOW 20 MIN: CPT | Mod: 25,S$GLB,, | Performed by: PODIATRIST

## 2018-06-01 NOTE — PROGRESS NOTES
Subjective:      Patient ID: Richard Bustos is a 72 y.o. male.    Chief Complaint: Foot Ulcer (right)    Richard is a 72 y.o. male who presents to the clinic for evaluation and treatment of high risk feet. Richard has a past medical history of Anemia; Anemia of other chronic disease (9/13/2017); Anemia, chronic renal failure (9/13/2017); Anemia, unspecified (9/13/2017); Anticoagulant long-term use; Aorta aneurysm; Arthritis; Atrial fibrillation; CAD (coronary artery disease); CHF (congestive heart failure); Chronic kidney disease; Clotting disorder (9/13/2017); Colon polyp; Diabetes mellitus; Diabetes mellitus type II; Diverticulosis; Elevated PSA; Encounter for blood transfusion; Former smoker; GERD (gastroesophageal reflux disease); Gout; colonic polyps; Hypercoagulable state; Hyperlipidemia; Hypertension; MI, old (2010); Myocardial infarction; Prostate cancer (06/2016); Sleep apnea; and Venous stasis. The patient's chief complaint is diabetic foot ulcer, bottom right forefoot  Dressed with football dressing applied last week - dirty, but. CDI.  Ambulating minimally in surgical shoe right and DM shoe insert  left.   This patient has documented high risk feet requiring routine maintenance secondary to diabetes mellitis and those secondary complications of diabetes, as mentioned..    Relates increased activity this week.          PCP: Familia Ramirez Jr, MD    Date Last Seen by PCP:   Chief Complaint   Patient presents with    Foot Ulcer     right         Current shoe gear:  Affected Foot: Rx diabetic extra depth shoes and custom accommodative insoles     Unaffected Foot: Rx diabetic extra depth shoes and custom accommodative insoles    History of Trauma: negative  Sign of Infection: none    Hemoglobin A1C   Date Value Ref Range Status   05/12/2018 6.3 (H) 4.0 - 5.6 % Final     Comment:     According to ADA guidelines, hemoglobin A1c <7.0% represents  optimal control in non-pregnant diabetic patients. Different  metrics  may apply to specific patient populations.   Standards of Medical Care in Diabetes-2016.  For the purpose of screening for the presence of diabetes:  <5.7%     Consistent with the absence of diabetes  5.7-6.4%  Consistent with increasing risk for diabetes   (prediabetes)  >or=6.5%  Consistent with diabetes  Currently, no consensus exists for use of hemoglobin A1c  for diagnosis of diabetes for children.  This Hemoglobin A1c assay has significant interference with fetal   hemoglobin   (HbF). The results are invalid for patients with abnormal amounts of   HbF,   including those with known Hereditary Persistence   of Fetal Hemoglobin. Heterozygous hemoglobin variants (HbAS, HbAC,   HbAD, HbAE, HbA2) do not significantly interfere with this assay;   however, presence of multiple variants in a sample may impact the %   interference.     09/19/2017 6.7 (H) 4.0 - 5.6 % Final     Comment:     According to ADA guidelines, hemoglobin A1c <7.0% represents  optimal control in non-pregnant diabetic patients. Different  metrics may apply to specific patient populations.   Standards of Medical Care in Diabetes-2016.  For the purpose of screening for the presence of diabetes:  <5.7%     Consistent with the absence of diabetes  5.7-6.4%  Consistent with increasing risk for diabetes   (prediabetes)  >or=6.5%  Consistent with diabetes  Currently, no consensus exists for use of hemoglobin A1c  for diagnosis of diabetes for children.  This Hemoglobin A1c assay has significant interference with fetal   hemoglobin   (HbF). The results are invalid for patients with abnormal amounts of   HbF,   including those with known Hereditary Persistence   of Fetal Hemoglobin. Heterozygous hemoglobin variants (HbAS, HbAC,   HbAD, HbAE, HbA2) do not significantly interfere with this assay;   however, presence of multiple variants in a sample may impact the %   interference.     12/15/2014 4.7 4.5 - 6.2 % Final   06/17/2013 6.8 (H) 4.8 - 5.6 % Final      Comment:              Increased risk for diabetes: 5.7 - 6.4           Diabetes: >6.4           Glycemic control for adults with diabetes: <7.0  **In order to standardize %HbA1c results worldwide, as of October 11, 2010,  the %HbA1c is being calculated using the master equation recommended in the  consensus statement adopted by the ADA (American Diabetes Assoc), EASD  (European Assoc for the Study of Diabetes), IFCC (International Federation  of Clinical Chemistry and Laboratory Medicine) and IDF (International  Diabetes Federation). Result units: %HgbA1c (DCCT/NGSP).  In common with other methods, Hb A1C values may not accurately reflect mean  blood glucose in patients with hemoglobin variants (HgbF, HgbS and HgbC).  Any cause of shortened erythrocyte survival will reduce exposure of  erythrocytes to glucose with a consequent decrease in HbA1c (%) values, even  though the time-averaged blood glucose level may be elevated. Causes of  shortened erythrocyte lifetime might be hemolytic anemia or other hemolytic  diseases, homozygous sickle cell trait, pregnancy, recent significant or  chronic blood loss, etc. Caution should be used when interpreting the HbA1c  results from patients with these conditions.       Review of Systems   Constitution: Negative for chills, fever, malaise/fatigue and night sweats.   Cardiovascular: Positive for leg swelling. Negative for claudication and cyanosis.   Skin: Positive for nail changes and poor wound healing. Negative for color change, itching, rash and suspicious lesions.   Musculoskeletal: Negative for falls, gout, joint pain and myalgias.   Neurological: Positive for numbness, paresthesias and sensory change.           Objective:      Physical Exam   Constitutional: He is oriented to person, place, and time. He appears well-developed and well-nourished. He is cooperative. No distress.   Cardiovascular:   Pulses:       Popliteal pulses are 2+ on the right side, and 2+ on the left  side.        Dorsalis pedis pulses are 1+ on the right side, and 1+ on the left side.        Posterior tibial pulses are 1+ on the right side, and 1+ on the left side.   Toes warm, pink, cap fill <5 seconds.   Musculoskeletal:        Right ankle: He exhibits normal range of motion, no swelling, no ecchymosis, no deformity, no laceration and normal pulse. Achilles tendon normal. Achilles tendon exhibits no pain, no defect and normal Chung's test results.   Hallux valgus and flexor toe deformities both feet without evidence of injury or loss of function.     All ten toes without clubbing, cyanosis, or signs of ischemia.  No pain to palpation bilateral lower extremities.  Range of motion, stability, muscle strength, and muscle tone normal bilateral feet and legs.     Lymphadenopathy: No inguinal adenopathy noted on the right or left side.   Negative lymphadenopathy bilateral popliteal fossa and tarsal tunnel.  Negative streaking both feet.   Neurological: He is alert and oriented to person, place, and time. He has normal strength. He displays no atrophy and no tremor. A sensory deficit is present. He exhibits normal muscle tone. Gait normal.   Reflex Scores:       Patellar reflexes are 2+ on the right side and 2+ on the left side.       Achilles reflexes are 2+ on the right side and 2+ on the left side.  Diminished/loss of protective sensation all toes bilateral to 10 gram monofilament.    Paresthesias, and burning bilateral feet with no clearly identified trigger or source.    Diminished/loss of protective sensation all toes bilateral to 10 gram monofilament.     Skin: Skin is warm, dry and intact. Capillary refill takes 2 to 3 seconds. No abrasion, no bruising, no burn, no ecchymosis, no laceration, no lesion and no rash noted. He is not diaphoretic. No cyanosis or erythema. No pallor. Nails show no clubbing.   Wound:  Plantar right 1st mtpj    Size:    Pre: [id number]  Post [id number]    Base:  Granular, pink with very  minimal serous/serosanaguinous drainage only.  No pus, tracking, fluctuance, malodor, or cardinal signs infection.    Borders:   Hyperkeratotic debriding to flat, pink, blanchable skin edges without undermining.    Otherwise, Skin thin, shiny, atrophic, with decreased density and distribution of pedal hair bilateral, but without hyperpigmentation, minerva discoloration,  ulcers, masses, nodules or cords palpated bilateral feet and legs.       Psychiatric: He has a normal mood and affect.             Assessment:       Encounter Diagnoses   Name Primary?    Skin ulcer of right foot with fat layer exposed Yes    Peripheral vascular disease     Diabetic polyneuropathy associated with type 2 diabetes mellitus          Plan:       Richard was seen today for foot ulcer.    Diagnoses and all orders for this visit:    Skin ulcer of right foot with fat layer exposed  -     X-Ray Foot Complete Right; Future    Peripheral vascular disease  -     X-Ray Foot Complete Right; Future    Diabetic polyneuropathy associated with type 2 diabetes mellitus  -     X-Ray Foot Complete Right; Future         I counseled the patient on his conditions, their implications and medical management.    Debrided wound.    Cover with  aperture x 2, davy foam, kerlix football - do not change.    continue sx shoe.  Minimal ambulation in sx shoe right, DM shoe custom insert left.    Discussed other advanced wound treatments:  HBOT, TCC, growth factors, biologic dressings/grafts, surgical offloading - declined presently pending improvement.    Xray right today - worse, why?          Follow-up in about 1 week (around 6/8/2018).

## 2018-06-01 NOTE — PROCEDURES
"Wound Debridement  Date/Time: 6/1/2018 11:54 AM  Performed by: ALBINO LARSON  Authorized by: ALBINO LARSON     Time out: Immediately prior to procedure a "time out" was called to verify the correct patient, procedure, equipment, support staff and site/side marked as required.    Consent Done?:  Yes (Written)  Local anesthesia used?: No      Wound Details:    Location:  Right foot    Location:  Right 1st Metatarsal Head    Type of Debridement:  Excisional       Length (cm):  2       Area (sq cm):  2.2       Width (cm):  1.1       Percent Debrided (%):  100       Depth (cm):  0.2       Total Area Debrided (sq cm):  2.2    Depth of debridement:  Subcutaneous tissue    Tissue debrided:  Subcutaneous, Dermis and Epidermis    Devitalized tissue debrided:  Callus, Biofilm and Sough    Instruments:  Nippers    Bleeding:  Minimal  Hemostasis Achieved: Yes    Method Used:  Pressure  Patient tolerance:  Patient tolerated the procedure well with no immediate complications      "

## 2018-06-04 ENCOUNTER — TELEPHONE (OUTPATIENT)
Dept: FAMILY MEDICINE | Facility: CLINIC | Age: 73
End: 2018-06-04

## 2018-06-04 NOTE — TELEPHONE ENCOUNTER
----- Message from Elsa Pacheco sent at 6/4/2018 11:19 AM CDT -----  Type:  RX Refill Request    Who Called:  Citlali Frye- daughter  Refill or New Rx:  refill  RX Name and Strength:   hydrocodone-acetaminophen 7.5-325mg (NORCO) 7.5-325 mg per tablet  ALPRAZolam (XANAX) 1 MG tablet  How is the patient currently taking it? (ex. 1XDay):  once  Is this a 30 day or 90 day RX:  30  Preferred Pharmacy with phone number:    [facility] [id number] [place]  [address] AT [facility]  [address] [address]  Phone: [phone] Fax: [phone]      Local or Mail Order: local  Ordering Provider: Ashley  Best Call Back Number: [phone]    Additional Information: Na

## 2018-06-04 NOTE — TELEPHONE ENCOUNTER
Spoke with chaka Godwin, and asked if the patient was out of his meds.  They were last filled on 5/12/18.  She stated that he was not out and would call back next week to request them.

## 2018-06-06 RX ORDER — ALENDRONATE SODIUM AND CHOLECALCIFEROL 70; 2800 MG/1; [IU]/1
1 TABLET ORAL
Qty: 4 TABLET | Refills: 11 | Status: SHIPPED | OUTPATIENT
Start: 2018-06-06 | End: 2018-07-12 | Stop reason: SDUPTHER

## 2018-06-09 DIAGNOSIS — D50.9 IRON DEFICIENCY ANEMIA: ICD-10-CM

## 2018-06-09 DIAGNOSIS — I10 ESSENTIAL HYPERTENSION: ICD-10-CM

## 2018-06-09 DIAGNOSIS — I25.10 CORONARY ARTERY DISEASE INVOLVING NATIVE CORONARY ARTERY OF NATIVE HEART WITHOUT ANGINA PECTORIS: Chronic | ICD-10-CM

## 2018-06-09 RX ORDER — MAGNESIUM OXIDE 400 MG
TABLET ORAL
Qty: 90 TABLET | Refills: 3 | Status: SHIPPED | OUTPATIENT
Start: 2018-06-09 | End: 2019-06-21 | Stop reason: SDUPTHER

## 2018-06-09 RX ORDER — CLOPIDOGREL BISULFATE 75 MG/1
TABLET ORAL
Qty: 90 TABLET | Refills: 3 | Status: SHIPPED | OUTPATIENT
Start: 2018-06-09 | End: 2018-09-28 | Stop reason: SDUPTHER

## 2018-06-09 RX ORDER — FERROUS SULFATE 325(65) MG
TABLET ORAL
Qty: 180 TABLET | Refills: 1 | Status: SHIPPED | OUTPATIENT
Start: 2018-06-09 | End: 2018-12-18 | Stop reason: SDUPTHER

## 2018-06-11 ENCOUNTER — OFFICE VISIT (OUTPATIENT)
Dept: PODIATRY | Facility: CLINIC | Age: 73
End: 2018-06-11
Payer: MEDICARE

## 2018-06-11 VITALS — BODY MASS INDEX: 43.84 KG/M2 | WEIGHT: 296 LBS | HEIGHT: 69 IN

## 2018-06-11 DIAGNOSIS — F41.9 ANXIETY: ICD-10-CM

## 2018-06-11 DIAGNOSIS — Z87.898 HISTORY OF ULCERATION: Primary | ICD-10-CM

## 2018-06-11 DIAGNOSIS — I73.9 PERIPHERAL VASCULAR DISEASE: ICD-10-CM

## 2018-06-11 DIAGNOSIS — M54.5 CHRONIC LOW BACK PAIN, UNSPECIFIED BACK PAIN LATERALITY, WITH SCIATICA PRESENCE UNSPECIFIED: ICD-10-CM

## 2018-06-11 DIAGNOSIS — E11.42 DIABETIC POLYNEUROPATHY ASSOCIATED WITH TYPE 2 DIABETES MELLITUS: ICD-10-CM

## 2018-06-11 DIAGNOSIS — M20.60 DEFORMITY OF TOE, UNSPECIFIED LATERALITY: ICD-10-CM

## 2018-06-11 DIAGNOSIS — G89.29 CHRONIC LOW BACK PAIN, UNSPECIFIED BACK PAIN LATERALITY, WITH SCIATICA PRESENCE UNSPECIFIED: ICD-10-CM

## 2018-06-11 PROCEDURE — 3044F HG A1C LEVEL LT 7.0%: CPT | Mod: CPTII,S$GLB,, | Performed by: PODIATRIST

## 2018-06-11 PROCEDURE — 99212 OFFICE O/P EST SF 10 MIN: CPT | Mod: S$GLB,,, | Performed by: PODIATRIST

## 2018-06-11 PROCEDURE — 99999 PR PBB SHADOW E&M-EST. PATIENT-LVL III: CPT | Mod: PBBFAC,,, | Performed by: PODIATRIST

## 2018-06-11 RX ORDER — ESCITALOPRAM OXALATE 10 MG/1
TABLET ORAL
Qty: 30 TABLET | Refills: 3 | Status: SHIPPED | OUTPATIENT
Start: 2018-06-11 | End: 2018-10-15 | Stop reason: SDUPTHER

## 2018-06-11 RX ORDER — ALPRAZOLAM 1 MG/1
TABLET ORAL
Qty: 30 TABLET | Refills: 0 | Status: SHIPPED | OUTPATIENT
Start: 2018-06-11 | End: 2018-07-09 | Stop reason: SDUPTHER

## 2018-06-11 RX ORDER — HYDROCODONE BITARTRATE AND ACETAMINOPHEN 7.5; 325 MG/1; MG/1
1 TABLET ORAL EVERY 6 HOURS PRN
Qty: 90 TABLET | Refills: 0 | Status: SHIPPED | OUTPATIENT
Start: 2018-06-11 | End: 2018-07-09 | Stop reason: SDUPTHER

## 2018-06-11 NOTE — PROGRESS NOTES
Subjective:      Patient ID: Richard Bustos is a 72 y.o. male.    Chief Complaint: Foot Ulcer (right foot)    Richard is a 72 y.o. male who presents to the clinic for evaluation and treatment of high risk feet. Richard has a past medical history of Anemia; Anemia of other chronic disease (9/13/2017); Anemia, chronic renal failure (9/13/2017); Anemia, unspecified (9/13/2017); Anticoagulant long-term use; Aorta aneurysm; Arthritis; Atrial fibrillation; CAD (coronary artery disease); CHF (congestive heart failure); Chronic kidney disease; Clotting disorder (9/13/2017); Colon polyp; Diabetes mellitus; Diabetes mellitus type II; Diverticulosis; Elevated PSA; Encounter for blood transfusion; Former smoker; GERD (gastroesophageal reflux disease); Gout; colonic polyps; Hypercoagulable state; Hyperlipidemia; Hypertension; MI, old (2010); Myocardial infarction; Prostate cancer (06/2016); Sleep apnea; and Venous stasis. The patient's chief complaint is diabetic foot ulcer, bottom right forefoot  Dressed with football dressing applied last week - dirty, but. CDI.  Ambulating minimally in surgical shoe right and DM shoe insert  left.   This patient has documented high risk feet requiring routine maintenance secondary to diabetes mellitis and those secondary complications of diabetes, as mentioned..    Relates decreased activity this week.  Did not get xray last visit.          PCP: Familia Ramirez Jr, MD    Date Last Seen by PCP:   Chief Complaint   Patient presents with    Foot Ulcer     right foot         Current shoe gear:  Affected Foot: Rx diabetic extra depth shoes and custom accommodative insoles     Unaffected Foot: Rx diabetic extra depth shoes and custom accommodative insoles    History of Trauma: negative  Sign of Infection: none    Hemoglobin A1C   Date Value Ref Range Status   05/12/2018 6.3 (H) 4.0 - 5.6 % Final     Comment:     According to ADA guidelines, hemoglobin A1c <7.0% represents  optimal control in non-pregnant  diabetic patients. Different  metrics may apply to specific patient populations.   Standards of Medical Care in Diabetes-2016.  For the purpose of screening for the presence of diabetes:  <5.7%     Consistent with the absence of diabetes  5.7-6.4%  Consistent with increasing risk for diabetes   (prediabetes)  >or=6.5%  Consistent with diabetes  Currently, no consensus exists for use of hemoglobin A1c  for diagnosis of diabetes for children.  This Hemoglobin A1c assay has significant interference with fetal   hemoglobin   (HbF). The results are invalid for patients with abnormal amounts of   HbF,   including those with known Hereditary Persistence   of Fetal Hemoglobin. Heterozygous hemoglobin variants (HbAS, HbAC,   HbAD, HbAE, HbA2) do not significantly interfere with this assay;   however, presence of multiple variants in a sample may impact the %   interference.     09/19/2017 6.7 (H) 4.0 - 5.6 % Final     Comment:     According to ADA guidelines, hemoglobin A1c <7.0% represents  optimal control in non-pregnant diabetic patients. Different  metrics may apply to specific patient populations.   Standards of Medical Care in Diabetes-2016.  For the purpose of screening for the presence of diabetes:  <5.7%     Consistent with the absence of diabetes  5.7-6.4%  Consistent with increasing risk for diabetes   (prediabetes)  >or=6.5%  Consistent with diabetes  Currently, no consensus exists for use of hemoglobin A1c  for diagnosis of diabetes for children.  This Hemoglobin A1c assay has significant interference with fetal   hemoglobin   (HbF). The results are invalid for patients with abnormal amounts of   HbF,   including those with known Hereditary Persistence   of Fetal Hemoglobin. Heterozygous hemoglobin variants (HbAS, HbAC,   HbAD, HbAE, HbA2) do not significantly interfere with this assay;   however, presence of multiple variants in a sample may impact the %   interference.     12/15/2014 4.7 4.5 - 6.2 % Final    06/17/2013 6.8 (H) 4.8 - 5.6 % Final     Comment:              Increased risk for diabetes: 5.7 - 6.4           Diabetes: >6.4           Glycemic control for adults with diabetes: <7.0  **In order to standardize %HbA1c results worldwide, as of October 11, 2010,  the %HbA1c is being calculated using the master equation recommended in the  consensus statement adopted by the ADA (American Diabetes Assoc), EASD  (European Assoc for the Study of Diabetes), IFCC (International Federation  of Clinical Chemistry and Laboratory Medicine) and IDF (International  Diabetes Federation). Result units: %HgbA1c (DCCT/NGSP).  In common with other methods, Hb A1C values may not accurately reflect mean  blood glucose in patients with hemoglobin variants (HgbF, HgbS and HgbC).  Any cause of shortened erythrocyte survival will reduce exposure of  erythrocytes to glucose with a consequent decrease in HbA1c (%) values, even  though the time-averaged blood glucose level may be elevated. Causes of  shortened erythrocyte lifetime might be hemolytic anemia or other hemolytic  diseases, homozygous sickle cell trait, pregnancy, recent significant or  chronic blood loss, etc. Caution should be used when interpreting the HbA1c  results from patients with these conditions.       Review of Systems   Constitution: Negative for chills, fever, malaise/fatigue and night sweats.   Cardiovascular: Positive for leg swelling. Negative for claudication and cyanosis.   Skin: Positive for nail changes and poor wound healing. Negative for color change, itching, rash and suspicious lesions.   Musculoskeletal: Negative for falls, gout, joint pain and myalgias.   Neurological: Positive for numbness, paresthesias and sensory change.           Objective:      Physical Exam   Constitutional: He is oriented to person, place, and time. He appears well-developed and well-nourished. He is cooperative. No distress.   Cardiovascular:   Pulses:       Popliteal pulses are  2+ on the right side, and 2+ on the left side.        Dorsalis pedis pulses are 1+ on the right side, and 1+ on the left side.        Posterior tibial pulses are 1+ on the right side, and 1+ on the left side.   Toes warm, pink, cap fill <5 seconds.   Musculoskeletal:        Right ankle: He exhibits normal range of motion, no swelling, no ecchymosis, no deformity, no laceration and normal pulse. Achilles tendon normal. Achilles tendon exhibits no pain, no defect and normal Chung's test results.   Hallux valgus and flexor toe deformities both feet without evidence of injury or loss of function.     All ten toes without clubbing, cyanosis, or signs of ischemia.  No pain to palpation bilateral lower extremities.  Range of motion, stability, muscle strength, and muscle tone normal bilateral feet and legs.     Lymphadenopathy: No inguinal adenopathy noted on the right or left side.   Negative lymphadenopathy bilateral popliteal fossa and tarsal tunnel.  Negative streaking both feet.   Neurological: He is alert and oriented to person, place, and time. He has normal strength. He displays no atrophy and no tremor. A sensory deficit is present. He exhibits normal muscle tone. Gait normal.   Reflex Scores:       Patellar reflexes are 2+ on the right side and 2+ on the left side.       Achilles reflexes are 2+ on the right side and 2+ on the left side.  Diminished/loss of protective sensation all toes bilateral to 10 gram monofilament.    Paresthesias, and burning bilateral feet with no clearly identified trigger or source.    Diminished/loss of protective sensation all toes bilateral to 10 gram monofilament.     Skin: Skin is warm, dry and intact. Capillary refill takes 2 to 3 seconds. No abrasion, no bruising, no burn, no ecchymosis, no laceration, no lesion and no rash noted. He is not diaphoretic. No cyanosis or erythema. No pallor. Nails show no clubbing.   Wound plantar right 1st mtpj closed today, epithelialized   without ulceration, drainage, pus, tracking, fluctuance, malodor, or cardinal signs infection.    Otherwise, Skin thin, shiny, atrophic, with decreased density and distribution of pedal hair bilateral, but without hyperpigmentation, minerva discoloration,  ulcers, masses, nodules or cords palpated bilateral feet and legs.       Psychiatric: He has a normal mood and affect.             Assessment:       Encounter Diagnoses   Name Primary?    History of ulceration Yes    Peripheral vascular disease     Diabetic polyneuropathy associated with type 2 diabetes mellitus          Plan:       Richard was seen today for foot ulcer.    Diagnoses and all orders for this visit:    History of ulceration  -     DIABETIC SHOES FOR HOME USE    Peripheral vascular disease  -     DIABETIC SHOES FOR HOME USE    Diabetic polyneuropathy associated with type 2 diabetes mellitus  -     DIABETIC SHOES FOR HOME USE         I counseled the patient on his conditions, their implications and medical management.    Inspect feet multiple times daily for signs of occurrence/recurrence ulceration.    Cover with  aperture x 2, davy foam, kerlix football - do not change.    continue sx shoe.  Minimal ambulation in sx shoe right, DM shoe custom insert left.    Discussed other advanced wound treatments:  HBOT, TCC, growth factors, biologic dressings/grafts, surgical offloading - declined presently pending improvement.    Rx new DM shoes, custom inserts.          Follow-up in about 1 week (around 6/18/2018).

## 2018-06-18 ENCOUNTER — OFFICE VISIT (OUTPATIENT)
Dept: PODIATRY | Facility: CLINIC | Age: 73
End: 2018-06-18
Payer: MEDICARE

## 2018-06-18 VITALS — HEIGHT: 69 IN | WEIGHT: 296 LBS | BODY MASS INDEX: 43.84 KG/M2

## 2018-06-18 DIAGNOSIS — Z87.898 HISTORY OF ULCERATION: Primary | ICD-10-CM

## 2018-06-18 DIAGNOSIS — I73.9 PERIPHERAL VASCULAR DISEASE: ICD-10-CM

## 2018-06-18 DIAGNOSIS — E11.42 DIABETIC POLYNEUROPATHY ASSOCIATED WITH TYPE 2 DIABETES MELLITUS: ICD-10-CM

## 2018-06-18 PROCEDURE — 99212 OFFICE O/P EST SF 10 MIN: CPT | Mod: S$GLB,,, | Performed by: PODIATRIST

## 2018-06-18 PROCEDURE — 99999 PR PBB SHADOW E&M-EST. PATIENT-LVL III: CPT | Mod: PBBFAC,,, | Performed by: PODIATRIST

## 2018-06-18 PROCEDURE — 3044F HG A1C LEVEL LT 7.0%: CPT | Mod: CPTII,S$GLB,, | Performed by: PODIATRIST

## 2018-06-18 NOTE — PROGRESS NOTES
Subjective:      Patient ID: Richard Bustos is a 72 y.o. male.    Chief Complaint: No chief complaint on file.    Richard is a 72 y.o. male who presents to the clinic for evaluation and treatment of high risk feet. Richard has a past medical history of Anemia; Anemia of other chronic disease (9/13/2017); Anemia, chronic renal failure (9/13/2017); Anemia, unspecified (9/13/2017); Anticoagulant long-term use; Aorta aneurysm; Arthritis; Atrial fibrillation; CAD (coronary artery disease); CHF (congestive heart failure); Chronic kidney disease; Clotting disorder (9/13/2017); Colon polyp; Diabetes mellitus; Diabetes mellitus type II; Diverticulosis; Elevated PSA; Encounter for blood transfusion; Former smoker; GERD (gastroesophageal reflux disease); Gout; colonic polyps; Hypercoagulable state; Hyperlipidemia; Hypertension; MI, old (2010); Myocardial infarction; Prostate cancer (06/2016); Sleep apnea; and Venous stasis. The patient's chief complaint is diabetic foot ulcer, bottom right forefoot  Dressed with football dressing applied last week - CDI.  Ambulating minimally in surgical shoe right and DM shoe insert  left.   This patient has documented high risk feet requiring routine maintenance secondary to diabetes mellitis and those secondary complications of diabetes, as mentioned..              PCP: Familia Ramirez Jr, MD    Date Last Seen by PCP:   No chief complaint on file.        Current shoe gear:  Affected Foot: Rx diabetic extra depth shoes and custom accommodative insoles     Unaffected Foot: Rx diabetic extra depth shoes and custom accommodative insoles    History of Trauma: negative  Sign of Infection: none    Hemoglobin A1C   Date Value Ref Range Status   05/12/2018 6.3 (H) 4.0 - 5.6 % Final     Comment:     According to ADA guidelines, hemoglobin A1c <7.0% represents  optimal control in non-pregnant diabetic patients. Different  metrics may apply to specific patient populations.   Standards of Medical Care in  Diabetes-2016.  For the purpose of screening for the presence of diabetes:  <5.7%     Consistent with the absence of diabetes  5.7-6.4%  Consistent with increasing risk for diabetes   (prediabetes)  >or=6.5%  Consistent with diabetes  Currently, no consensus exists for use of hemoglobin A1c  for diagnosis of diabetes for children.  This Hemoglobin A1c assay has significant interference with fetal   hemoglobin   (HbF). The results are invalid for patients with abnormal amounts of   HbF,   including those with known Hereditary Persistence   of Fetal Hemoglobin. Heterozygous hemoglobin variants (HbAS, HbAC,   HbAD, HbAE, HbA2) do not significantly interfere with this assay;   however, presence of multiple variants in a sample may impact the %   interference.     09/19/2017 6.7 (H) 4.0 - 5.6 % Final     Comment:     According to ADA guidelines, hemoglobin A1c <7.0% represents  optimal control in non-pregnant diabetic patients. Different  metrics may apply to specific patient populations.   Standards of Medical Care in Diabetes-2016.  For the purpose of screening for the presence of diabetes:  <5.7%     Consistent with the absence of diabetes  5.7-6.4%  Consistent with increasing risk for diabetes   (prediabetes)  >or=6.5%  Consistent with diabetes  Currently, no consensus exists for use of hemoglobin A1c  for diagnosis of diabetes for children.  This Hemoglobin A1c assay has significant interference with fetal   hemoglobin   (HbF). The results are invalid for patients with abnormal amounts of   HbF,   including those with known Hereditary Persistence   of Fetal Hemoglobin. Heterozygous hemoglobin variants (HbAS, HbAC,   HbAD, HbAE, HbA2) do not significantly interfere with this assay;   however, presence of multiple variants in a sample may impact the %   interference.     12/15/2014 4.7 4.5 - 6.2 % Final   06/17/2013 6.8 (H) 4.8 - 5.6 % Final     Comment:              Increased risk for diabetes: 5.7 - 6.4            Diabetes: >6.4           Glycemic control for adults with diabetes: <7.0  **In order to standardize %HbA1c results worldwide, as of October 11, 2010,  the %HbA1c is being calculated using the master equation recommended in the  consensus statement adopted by the ADA (American Diabetes Assoc), EASD  (European Assoc for the Study of Diabetes), IFCC (International Federation  of Clinical Chemistry and Laboratory Medicine) and IDF (International  Diabetes Federation). Result units: %HgbA1c (DCCT/NGSP).  In common with other methods, Hb A1C values may not accurately reflect mean  blood glucose in patients with hemoglobin variants (HgbF, HgbS and HgbC).  Any cause of shortened erythrocyte survival will reduce exposure of  erythrocytes to glucose with a consequent decrease in HbA1c (%) values, even  though the time-averaged blood glucose level may be elevated. Causes of  shortened erythrocyte lifetime might be hemolytic anemia or other hemolytic  diseases, homozygous sickle cell trait, pregnancy, recent significant or  chronic blood loss, etc. Caution should be used when interpreting the HbA1c  results from patients with these conditions.       Review of Systems   Constitution: Negative for chills, fever, malaise/fatigue and night sweats.   Cardiovascular: Positive for leg swelling. Negative for claudication and cyanosis.   Skin: Positive for nail changes and poor wound healing. Negative for color change, itching, rash and suspicious lesions.   Musculoskeletal: Negative for falls, gout, joint pain and myalgias.   Neurological: Positive for numbness, paresthesias and sensory change.           Objective:      Physical Exam   Constitutional: He is oriented to person, place, and time. He appears well-developed and well-nourished. He is cooperative. No distress.   Cardiovascular:   Pulses:       Popliteal pulses are 2+ on the right side, and 2+ on the left side.        Dorsalis pedis pulses are 1+ on the right side, and 1+ on  the left side.        Posterior tibial pulses are 1+ on the right side, and 1+ on the left side.   Toes warm, pink, cap fill <5 seconds.   Musculoskeletal:        Right ankle: He exhibits normal range of motion, no swelling, no ecchymosis, no deformity, no laceration and normal pulse. Achilles tendon normal. Achilles tendon exhibits no pain, no defect and normal Chung's test results.   Hallux valgus and flexor toe deformities both feet without evidence of injury or loss of function.     All ten toes without clubbing, cyanosis, or signs of ischemia.  No pain to palpation bilateral lower extremities.  Range of motion, stability, muscle strength, and muscle tone normal bilateral feet and legs.     Lymphadenopathy: No inguinal adenopathy noted on the right or left side.   Negative lymphadenopathy bilateral popliteal fossa and tarsal tunnel.  Negative streaking both feet.   Neurological: He is alert and oriented to person, place, and time. He has normal strength. He displays no atrophy and no tremor. A sensory deficit is present. He exhibits normal muscle tone. Gait normal.   Reflex Scores:       Patellar reflexes are 2+ on the right side and 2+ on the left side.       Achilles reflexes are 2+ on the right side and 2+ on the left side.  Diminished/loss of protective sensation all toes bilateral to 10 gram monofilament.    Paresthesias, and burning bilateral feet with no clearly identified trigger or source.    Diminished/loss of protective sensation all toes bilateral to 10 gram monofilament.     Skin: Skin is warm, dry and intact. Capillary refill takes 2 to 3 seconds. No abrasion, no bruising, no burn, no ecchymosis, no laceration, no lesion and no rash noted. He is not diaphoretic. No cyanosis or erythema. No pallor. Nails show no clubbing.   Wound plantar right 1st mtpj remains closed today, epithelialized  without ulceration, drainage, pus, tracking, fluctuance, malodor, or cardinal signs infection.    Otherwise,  Skin thin, shiny, atrophic, with decreased density and distribution of pedal hair bilateral, but without hyperpigmentation, minerva discoloration,  ulcers, masses, nodules or cords palpated bilateral feet and legs.       Psychiatric: He has a normal mood and affect.             Assessment:       Encounter Diagnoses   Name Primary?    History of ulceration Yes    Peripheral vascular disease     Diabetic polyneuropathy associated with type 2 diabetes mellitus          Plan:       Diagnoses and all orders for this visit:    History of ulceration    Peripheral vascular disease    Diabetic polyneuropathy associated with type 2 diabetes mellitus         I counseled the patient on his conditions, their implications and medical management.    Inspect feet multiple times daily for signs of occurrence/recurrence ulceration.    Cover with  aperture, davy foam, kerlix to protect - same at home until confident in maintenance.    continue sx shoe.  Minimal ambulation in sx shoe right, DM shoe custom insert left.    Fill Rx new DM shoes, custom inserts.          Follow-up if symptoms worsen or fail to improve.

## 2018-06-19 ENCOUNTER — LAB VISIT (OUTPATIENT)
Dept: LAB | Facility: OTHER | Age: 73
End: 2018-06-19
Attending: PSYCHIATRY & NEUROLOGY
Payer: MEDICARE

## 2018-06-19 ENCOUNTER — OFFICE VISIT (OUTPATIENT)
Dept: NEUROLOGY | Facility: CLINIC | Age: 73
End: 2018-06-19
Payer: MEDICARE

## 2018-06-19 VITALS
HEART RATE: 52 BPM | HEIGHT: 69 IN | BODY MASS INDEX: 44.11 KG/M2 | SYSTOLIC BLOOD PRESSURE: 132 MMHG | DIASTOLIC BLOOD PRESSURE: 78 MMHG | WEIGHT: 297.81 LBS

## 2018-06-19 DIAGNOSIS — I15.2 HYPERTENSION ASSOCIATED WITH DIABETES: ICD-10-CM

## 2018-06-19 DIAGNOSIS — I73.9 PERIPHERAL VASCULAR DISEASE: ICD-10-CM

## 2018-06-19 DIAGNOSIS — F41.9 ANXIETY: ICD-10-CM

## 2018-06-19 DIAGNOSIS — E11.42 DIABETIC POLYNEUROPATHY ASSOCIATED WITH TYPE 2 DIABETES MELLITUS: ICD-10-CM

## 2018-06-19 DIAGNOSIS — R41.3 MEMORY LOSS: ICD-10-CM

## 2018-06-19 DIAGNOSIS — I25.10 CORONARY ARTERY DISEASE INVOLVING NATIVE CORONARY ARTERY OF NATIVE HEART WITHOUT ANGINA PECTORIS: Chronic | ICD-10-CM

## 2018-06-19 DIAGNOSIS — E78.00 PURE HYPERCHOLESTEROLEMIA: ICD-10-CM

## 2018-06-19 DIAGNOSIS — N18.30 CKD (CHRONIC KIDNEY DISEASE) STAGE 3, GFR 30-59 ML/MIN: ICD-10-CM

## 2018-06-19 DIAGNOSIS — E11.59 HYPERTENSION ASSOCIATED WITH DIABETES: ICD-10-CM

## 2018-06-19 DIAGNOSIS — G47.33 OBSTRUCTIVE SLEEP APNEA SYNDROME: Chronic | ICD-10-CM

## 2018-06-19 DIAGNOSIS — R41.3 MEMORY LOSS: Primary | ICD-10-CM

## 2018-06-19 DIAGNOSIS — I48.0 PAROXYSMAL ATRIAL FIBRILLATION: ICD-10-CM

## 2018-06-19 LAB
FOLATE SERPL-MCNC: 18.2 NG/ML
RPR SER QL: NORMAL
VIT B12 SERPL-MCNC: >2000 PG/ML

## 2018-06-19 PROCEDURE — 3078F DIAST BP <80 MM HG: CPT | Mod: CPTII,S$GLB,, | Performed by: PSYCHIATRY & NEUROLOGY

## 2018-06-19 PROCEDURE — 82746 ASSAY OF FOLIC ACID SERUM: CPT

## 2018-06-19 PROCEDURE — 83921 ORGANIC ACID SINGLE QUANT: CPT

## 2018-06-19 PROCEDURE — 86592 SYPHILIS TEST NON-TREP QUAL: CPT

## 2018-06-19 PROCEDURE — 36415 COLL VENOUS BLD VENIPUNCTURE: CPT

## 2018-06-19 PROCEDURE — 3044F HG A1C LEVEL LT 7.0%: CPT | Mod: CPTII,S$GLB,, | Performed by: PSYCHIATRY & NEUROLOGY

## 2018-06-19 PROCEDURE — 99204 OFFICE O/P NEW MOD 45 MIN: CPT | Mod: S$GLB,,, | Performed by: PSYCHIATRY & NEUROLOGY

## 2018-06-19 PROCEDURE — 99999 PR PBB SHADOW E&M-EST. PATIENT-LVL III: CPT | Mod: PBBFAC,,, | Performed by: PSYCHIATRY & NEUROLOGY

## 2018-06-19 PROCEDURE — 82607 VITAMIN B-12: CPT

## 2018-06-19 PROCEDURE — 3075F SYST BP GE 130 - 139MM HG: CPT | Mod: CPTII,S$GLB,, | Performed by: PSYCHIATRY & NEUROLOGY

## 2018-06-19 RX ORDER — CALCITRIOL 0.5 UG/1
0.75 CAPSULE ORAL DAILY
Refills: 4 | Status: ON HOLD | COMMUNITY
Start: 2018-06-11 | End: 2023-04-03 | Stop reason: SDUPTHER
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
## 2018-06-19 NOTE — LETTER
June 19, 2018      Familia Ramirez Jr., MD  [address] [address]           [facility] - Neurology  [facility] [address]  Phone: [phone]  Fax: [phone]          Patient: Richard Bustos   MR Number: [id number]   YOB: 1945   Date of Visit: 6/19/2018       Dear Dr. Familia Rmairez Jr.:    Thank you for referring Richard Bustos to me for evaluation. Attached you will find relevant portions of my assessment and plan of care.    If you have questions, please do not hesitate to call me. I look forward to following Richard Bustos along with you.    Sincerely,    Kota Harris MD    Enclosure  CC:  No Recipients    If you would like to receive this communication electronically, please contact [email or web address] or [phone] to request more information on [employer] access.    For providers and/or their staff who would like to refer a patient to [facility], please contact us through our one-stop-shop provider referral line, [facility] [place], at [phone].    If you feel you have received this communication in error or would no longer like to receive these types of communications, please e-mail [email or web address]

## 2018-06-19 NOTE — PATIENT INSTRUCTIONS
Discussed with patient and son.  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  He has multiple vascular risk factors as noted above.  Control of vascular risk factors specially diabetes is important.  Reviewed recent blood work done.  Will get a B12/folate/ RPR /methylmalonic acid.  Will also set patient for neuropsychological testing and will see the patient in follow-up after testing is completed.

## 2018-06-22 LAB — METHYLMALONATE SERPL-SCNC: 0.34 UMOL/L

## 2018-06-27 ENCOUNTER — TELEPHONE (OUTPATIENT)
Dept: NEUROLOGY | Facility: CLINIC | Age: 73
End: 2018-06-27

## 2018-06-27 DIAGNOSIS — E11.42 DIABETIC POLYNEUROPATHY ASSOCIATED WITH TYPE 2 DIABETES MELLITUS: ICD-10-CM

## 2018-06-27 DIAGNOSIS — I15.2 HYPERTENSION ASSOCIATED WITH DIABETES: ICD-10-CM

## 2018-06-27 DIAGNOSIS — F41.9 ANXIETY: ICD-10-CM

## 2018-06-27 DIAGNOSIS — I48.0 PAROXYSMAL ATRIAL FIBRILLATION: ICD-10-CM

## 2018-06-27 DIAGNOSIS — I73.9 PERIPHERAL VASCULAR DISEASE: ICD-10-CM

## 2018-06-27 DIAGNOSIS — I25.10 CORONARY ARTERY DISEASE INVOLVING NATIVE CORONARY ARTERY OF NATIVE HEART WITHOUT ANGINA PECTORIS: Chronic | ICD-10-CM

## 2018-06-27 DIAGNOSIS — E11.59 HYPERTENSION ASSOCIATED WITH DIABETES: ICD-10-CM

## 2018-06-27 DIAGNOSIS — G47.33 OBSTRUCTIVE SLEEP APNEA SYNDROME: Chronic | ICD-10-CM

## 2018-06-27 DIAGNOSIS — R41.3 MEMORY LOSS: Primary | ICD-10-CM

## 2018-06-27 DIAGNOSIS — E78.00 PURE HYPERCHOLESTEROLEMIA: ICD-10-CM

## 2018-06-27 DIAGNOSIS — N18.30 CKD (CHRONIC KIDNEY DISEASE) STAGE 3, GFR 30-59 ML/MIN: ICD-10-CM

## 2018-06-27 NOTE — TELEPHONE ENCOUNTER
----- Message from Jose Ferrell sent at 6/27/2018  3:37 PM CDT -----  Contact: Pt's Daughter Citlali Bustos  [phone]   Pt's Daughter Citlali Bustos would like to be called back regarding scheduling additional test.    Daughter can be reached at [phone].    Thanks

## 2018-06-28 ENCOUNTER — OFFICE VISIT (OUTPATIENT)
Dept: ORTHOPEDICS | Facility: CLINIC | Age: 73
End: 2018-06-28
Payer: MEDICARE

## 2018-06-28 ENCOUNTER — TELEPHONE (OUTPATIENT)
Dept: FAMILY MEDICINE | Facility: CLINIC | Age: 73
End: 2018-06-28

## 2018-06-28 ENCOUNTER — HOSPITAL ENCOUNTER (OUTPATIENT)
Dept: RADIOLOGY | Facility: HOSPITAL | Age: 73
Discharge: HOME | End: 2018-06-28
Attending: ORTHOPAEDIC SURGERY
Payer: MEDICARE

## 2018-06-28 VITALS
BODY MASS INDEX: 43.99 KG/M2 | HEART RATE: 61 BPM | SYSTOLIC BLOOD PRESSURE: 102 MMHG | DIASTOLIC BLOOD PRESSURE: 52 MMHG | HEIGHT: 69 IN | WEIGHT: 297 LBS

## 2018-06-28 DIAGNOSIS — M25.511 RIGHT SHOULDER PAIN, UNSPECIFIED CHRONICITY: ICD-10-CM

## 2018-06-28 DIAGNOSIS — M25.511 RIGHT SHOULDER PAIN, UNSPECIFIED CHRONICITY: Primary | ICD-10-CM

## 2018-06-28 DIAGNOSIS — M75.111 INCOMPLETE TEAR OF RIGHT ROTATOR CUFF: Primary | ICD-10-CM

## 2018-06-28 PROCEDURE — 20610 DRAIN/INJ JOINT/BURSA W/O US: CPT | Mod: RT,S$GLB,, | Performed by: ORTHOPAEDIC SURGERY

## 2018-06-28 PROCEDURE — 99203 OFFICE O/P NEW LOW 30 MIN: CPT | Mod: 25,S$GLB,, | Performed by: ORTHOPAEDIC SURGERY

## 2018-06-28 PROCEDURE — 73030 X-RAY EXAM OF SHOULDER: CPT | Mod: 26,RT,, | Performed by: RADIOLOGY

## 2018-06-28 PROCEDURE — 99999 PR PBB SHADOW E&M-EST. PATIENT-LVL III: CPT | Mod: PBBFAC,,, | Performed by: ORTHOPAEDIC SURGERY

## 2018-06-28 PROCEDURE — 3074F SYST BP LT 130 MM HG: CPT | Mod: CPTII,S$GLB,, | Performed by: ORTHOPAEDIC SURGERY

## 2018-06-28 PROCEDURE — 73030 X-RAY EXAM OF SHOULDER: CPT | Mod: TC,PN,RT

## 2018-06-28 PROCEDURE — 3078F DIAST BP <80 MM HG: CPT | Mod: CPTII,S$GLB,, | Performed by: ORTHOPAEDIC SURGERY

## 2018-06-28 RX ORDER — METHYLPREDNISOLONE 4 MG/1
TABLET ORAL
Qty: 21 TABLET | Refills: 0 | Status: ON HOLD | OUTPATIENT
Start: 2018-06-28 | End: 2018-09-26 | Stop reason: HOSPADM

## 2018-06-28 RX ADMIN — TRIAMCINOLONE ACETONIDE 40 MG: 40 INJECTION, SUSPENSION INTRA-ARTICULAR; INTRAMUSCULAR at 08:06

## 2018-06-28 NOTE — PROGRESS NOTES
Past Medical History:   Diagnosis Date    Anemia     Anemia of other chronic disease 9/13/2017    Anemia, chronic renal failure 9/13/2017    Anemia, unspecified 9/13/2017    Anticoagulant long-term use     Aorta aneurysm     Arthritis     Atrial fibrillation     CAD (coronary artery disease)     CHF (congestive heart failure)     Chronic kidney disease     Clotting disorder 9/13/2017    Colon polyp     Diabetes mellitus     not on meds    Diabetes mellitus type II     Diverticulosis     Elevated PSA     Encounter for blood transfusion     Former smoker     GERD (gastroesophageal reflux disease)     Gout     Hx of colonic polyps     Hypercoagulable state     Hyperlipidemia     Hypertension     MI, old 2010    Myocardial infarction     9/2010    Prostate cancer 06/2016    Sleep apnea     Venous stasis     Chronic       Past Surgical History:   Procedure Laterality Date    ABDOMINAL SURGERY      CARDIAC SURGERY      COLONOSCOPY  02/2011    diverticulosis with diverticula with clot, no active bleeding    COLONOSCOPY N/A 8/24/2016    Procedure: COLONOSCOPY;  Surgeon: Saroj Borjas MD;  Location: [facility];  Service: Endoscopy;  Laterality: N/A;    GASTRIC BYPASS  2/5/2008    IVC FILTER RETRIEVAL      JOINT REPLACEMENT  1996 and 2001    bi-lat hip replacement/Rt Hip and Lt Hip    ROTATOR CUFF REPAIR      Rt shoulder    Stents  8/18/2010    x 3    UPPER GASTROINTESTINAL ENDOSCOPY  02/2011       Current Outpatient Prescriptions   Medication Sig    allopurinol (ZYLOPRIM) 100 MG tablet TAKE 1 TABLET(100 MG) BY MOUTH EVERY DAY    ALPRAZolam (XANAX) 1 MG tablet TAKE 1 TABLET(1 MG) BY MOUTH EVERY EVENING    aspirin (ECOTRIN) 81 MG EC tablet Take 81 mg by mouth once daily.    atorvastatin (LIPITOR) 80 MG tablet TAKE 1 TABLET(80 MG) BY MOUTH EVERY DAY    calcitRIOL (ROCALTROL) 0.5 MCG Cap TK 1 C PO D    calcium carbonate (TUMS ULTRA) 400 mg calcium (1,000 mg) Chew Take 1 tablet  (400 mg total) by mouth once daily at 6am.    carvedilol (COREG) 25 MG tablet TAKE 1 TABLET BY MOUTH TWICE DAILY WITH MEALS    ciclopirox (PENLAC) 8 % Soln Apply topically nightly. (Patient taking differently: Apply topically daily as needed. )    clopidogrel (PLAVIX) 75 mg tablet Take 1 tablet (75 mg total) by mouth once daily.    clopidogrel (PLAVIX) 75 mg tablet TAKE 1 TABLET BY MOUTH DAILY    clotrimazole-betamethasone 1-0.05% (LOTRISONE) cream Apply topically 2 (two) times daily. To head of penis (Patient taking differently: Apply topically daily as needed. To head of penis)    ergocalciferol (ERGOCALCIFEROL) 50,000 unit Cap TK 1 C PO Q WEEK    escitalopram oxalate (LEXAPRO) 10 MG tablet TAKE 1/2 DAILY FOR FIRST 7 DAYS THEN THEREAFTER TAKE 1 TABLET BY MOUTH DAILY    ferrous sulfate 325 mg (65 mg iron) Tab tablet TAKE 1 TABLET BY MOUTH TWICE DAILY    fluticasone (FLONASE) 50 mcg/actuation nasal spray 2 sprays by Each Nare route once daily.    FOLIC ACID/MULTIVIT-MIN/LUTEIN (CENTRUM SILVER ORAL) Take 1 tablet by mouth once daily.    FOSAMAX PLUS D 70 mg- 2,800 unit per tablet TAKE 1 TABLET BY MOUTH EVERY 7 DAYS    furosemide (LASIX) 40 MG tablet TAKE 1 TABLET BY MOUTH DAILY AS NEEDED    HYDROcodone-acetaminophen (NORCO) 7.5-325 mg per tablet Take 1 tablet by mouth every 6 (six) hours as needed for Pain.    ipratropium (ATROVENT) 0.02 % nebulizer solution Take 2.5 mLs (500 mcg total) by nebulization 4 (four) times daily. Rescue    L-METHYL-B6-B12 3-35-2 mg Tab TAKE 1 TABLET BY MOUTH TWICE DAILY    lisinopril (PRINIVIL,ZESTRIL) 40 MG tablet TAKE 1 TABLET BY MOUTH EVERY EVENING    MAGOX 400 mg tablet TAKE 1 TABLET BY MOUTH EVERY DAY    mirabegron (MYRBETRIQ) 50 mg Tb24 Take 1 tablet (50 mg total) by mouth once daily.    mupirocin (BACTROBAN) 2 % ointment Apply topically 2 (two) times daily.    nitroGLYCERIN 0.4 MG/DOSE TL SPRY (NITROLINGUAL) 400 mcg/spray spray PLACE 1 SPRAY UNDER THE TONGUE  EVERY 5 MINUTES AS NEEDED FOR CHEST PAIN    omeprazole (PRILOSEC) 20 MG capsule TAKE 1 CAPSULE(20 MG) BY MOUTH EVERY DAY    ranitidine (ZANTAC) 150 MG tablet TAKE 1 TABLET(150 MG) BY MOUTH TWICE DAILY    salicylic acid (SALACYN) 6 % Crea Apply 1 application topically 2 (two) times daily. (Patient taking differently: Apply 1 application topically daily as needed. )    vit C-vit E-lutein-min-om-3 (OCUVITE) 485-83-2-150 mg-unit-mg-mg Cap Take 1 capsule by mouth once daily.    warfarin (COUMADIN) 5 MG tablet TAKE 1 TABLET BY MOUTH EVERY DAY (Patient taking differently: TAKE 1 TABLET BY MOUTH EVERY DAY EXCEPT 1 1/2 TABLETS ON WEDNESDAY)     No current facility-administered medications for this visit.        Review of patient's allergies indicates:   Allergen Reactions    Shellfish containing products Other (See Comments)     Other reaction(s): Gout       Family History   Problem Relation Age of Onset    Heart attack Mother     Heart attack Father     Heart attack Brother     Ulcerative colitis Daughter 35    Lupus Daughter     Colon cancer Neg Hx     Colon polyps Neg Hx     Crohn's disease Neg Hx     Melanoma Neg Hx     Psoriasis Neg Hx     Eczema Neg Hx        Social History     Social History    Marital status:      Spouse name: N/A    Number of children: N/A    Years of education: N/A     Occupational History    Not on file.     Social History Main Topics    Smoking status: Former Smoker    Smokeless tobacco: Never Used      Comment: 45 yrs ago, only smoked 3-4 packs in his entire life as a teenager    Alcohol use Yes      Comment: rare    Drug use: No    Sexual activity: Not on file     Other Topics Concern    Not on file     Social History Narrative    No narrative on file       Chief Complaint:   Chief Complaint   Patient presents with    Right Shoulder - Pain       History of present illness:  72-year-old morbidly obese male who was in a wheelchair comes in for severe right  shoulder pain that started on June 25th.  No injury or trauma.  Pain radiates into his neck.  Patient has had issues with shoulder in the past and has never had anything done about it.  Pain today 8/10.  He takes gabapentin for pain.      Review of Systems:      Musculoskeletal:  See HPI      Physical Examination:    Vital Signs:  There were no vitals filed for this visit.    Body mass index is 43.86 kg/m².    This a well-developed, well nourished patient in no acute distress.  They are alert and oriented and cooperative to examination.  Pt.  Comes in in a wheelchair    Examination right shoulder shows no bruising or swelling.  Patient has decent range of motion but some weakness and limitations in overhead lifting.  4/5 strength. 2+ radial pulse.  Intact median, radial, and ulnar motor and sensory examination.    Examination of the left shoulder shows no rashes or erythema. There are no masses, ecchymosis, or atrophy. The patient has full range of motion in forward flexion, external rotation, and internal rotation to the mid T-spine. The patient has - impingement signs. - Rumsey's test. - Speeds test. Nontender to palpation over a.c. joint.  Passive range of motion: Forward flexion of 180°, external rotation at 90° of 90°, internal rotation of 50°, and external rotation at 0° of 50°. 2+ radial pulse. Intact axillary, radial, median and ulnar sensation. 5 out of 5 resisted forward flexion, external rotation, and negative lift off test.      X-rays:  X-rays of the right shoulder ordered and reviewed which show signs of possible prior rotator cuff tear.     Assessment::  Right rotator cuff tear    Plan:  I reviewed the finding with him today.  Recommended a steroid injection to help with his pain. Patient might need an MRI but he looks like a very poor surgical candidate anyway.  Might benefit from some PT.    This note was created using [employer] voice recognition software that occasionally misinterpreted phrases or  words.    Consult note is delivered via Epic messaging service.

## 2018-06-28 NOTE — TELEPHONE ENCOUNTER
----- Message from Kari Engle LPN sent at 6/27/2018  4:29 PM CDT -----  Contact: daughter/Citlali/[phone]      ----- Message -----  From: Em Murphy  Sent: 6/27/2018   2:02 PM  [facility]    Patients daughter states that patient need to see the doctor/NP tomorrow for shoulder pain.  Call Citlali at [phone].

## 2018-07-02 RX ORDER — TRIAMCINOLONE ACETONIDE 40 MG/ML
40 INJECTION, SUSPENSION INTRA-ARTICULAR; INTRAMUSCULAR
Status: DISCONTINUED | OUTPATIENT
Start: 2018-06-28 | End: 2018-07-02 | Stop reason: HOSPADM

## 2018-07-02 NOTE — PROCEDURES
Large Joint Aspiration/Injection  Date/Time: 6/28/2018 8:28 AM  Performed by: NADINE AVALOS  Authorized by: NADINE VAALOS     Consent Done?:  Yes (Verbal)  Indications:  Pain  Procedure site marked: Yes    Timeout: Prior to procedure the correct patient, procedure, and site was verified      Location:  Shoulder  Site:  R subacromial bursa  Prep: Patient was prepped and draped in usual sterile fashion    Ultrasonic Guidance for needle placement: No  Needle size:  20 G  Approach:  Posterior  Medications:  40 mg triamcinolone acetonide 40 mg/mL  Patient tolerance:  Patient tolerated the procedure well with no immediate complications

## 2018-07-05 ENCOUNTER — LAB VISIT (OUTPATIENT)
Dept: LAB | Facility: HOSPITAL | Age: 73
End: 2018-07-05
Attending: UROLOGY
Payer: MEDICARE

## 2018-07-05 ENCOUNTER — ANTI-COAG VISIT (OUTPATIENT)
Dept: CARDIOLOGY | Facility: CLINIC | Age: 73
End: 2018-07-05

## 2018-07-05 DIAGNOSIS — D68.59 HYPERCOAGULABLE STATE: ICD-10-CM

## 2018-07-05 DIAGNOSIS — Z79.01 LONG TERM (CURRENT) USE OF ANTICOAGULANTS: ICD-10-CM

## 2018-07-05 DIAGNOSIS — C61 PROSTATE CANCER: ICD-10-CM

## 2018-07-05 DIAGNOSIS — N28.1 RENAL CYST: ICD-10-CM

## 2018-07-05 DIAGNOSIS — N32.81 OAB (OVERACTIVE BLADDER): ICD-10-CM

## 2018-07-05 LAB
COMPLEXED PSA SERPL-MCNC: <0.01 NG/ML
INR PPP: 2.3
PROTHROMBIN TIME: 23.2 SEC
TESTOST SERPL-MCNC: 11 NG/DL

## 2018-07-05 PROCEDURE — 84403 ASSAY OF TOTAL TESTOSTERONE: CPT

## 2018-07-05 PROCEDURE — 85610 PROTHROMBIN TIME: CPT

## 2018-07-05 PROCEDURE — 84153 ASSAY OF PSA TOTAL: CPT

## 2018-07-05 PROCEDURE — 36415 COLL VENOUS BLD VENIPUNCTURE: CPT

## 2018-07-09 ENCOUNTER — TELEPHONE (OUTPATIENT)
Dept: FAMILY MEDICINE | Facility: CLINIC | Age: 73
End: 2018-07-09

## 2018-07-09 ENCOUNTER — TELEPHONE (OUTPATIENT)
Dept: CARDIOLOGY | Facility: CLINIC | Age: 73
End: 2018-07-09

## 2018-07-09 ENCOUNTER — PATIENT MESSAGE (OUTPATIENT)
Dept: FAMILY MEDICINE | Facility: CLINIC | Age: 73
End: 2018-07-09

## 2018-07-09 DIAGNOSIS — G89.29 CHRONIC LOW BACK PAIN, UNSPECIFIED BACK PAIN LATERALITY, WITH SCIATICA PRESENCE UNSPECIFIED: ICD-10-CM

## 2018-07-09 DIAGNOSIS — M54.5 CHRONIC LOW BACK PAIN, UNSPECIFIED BACK PAIN LATERALITY, WITH SCIATICA PRESENCE UNSPECIFIED: ICD-10-CM

## 2018-07-09 DIAGNOSIS — G47.33 OBSTRUCTIVE SLEEP APNEA SYNDROME: Primary | Chronic | ICD-10-CM

## 2018-07-09 DIAGNOSIS — F41.9 ANXIETY: ICD-10-CM

## 2018-07-09 RX ORDER — ALPRAZOLAM 1 MG/1
TABLET ORAL
Qty: 30 TABLET | Refills: 0 | Status: SHIPPED | OUTPATIENT
Start: 2018-07-11 | End: 2018-08-09 | Stop reason: SDUPTHER

## 2018-07-09 RX ORDER — HYDROCODONE BITARTRATE AND ACETAMINOPHEN 7.5; 325 MG/1; MG/1
1 TABLET ORAL EVERY 6 HOURS PRN
Qty: 90 TABLET | Refills: 0 | Status: SHIPPED | OUTPATIENT
Start: 2018-07-11 | End: 2018-08-09 | Stop reason: SDUPTHER

## 2018-07-09 NOTE — TELEPHONE ENCOUNTER
lov 6/21/18  lab 3/19/18  last written 6/9/18  appt 9/13/18    Pain contract 12/4/17     reviewed    Refilled both for 7/11/18

## 2018-07-09 NOTE — TELEPHONE ENCOUNTER
Called pt regarding below message. [employer] states pt needs a new order for BiPAP, medical necessity form and clinical notes faxed to [phone]. Informed PH I will send this information to Dr. Ramirez for review. Verbalized understanding with no further questions.     ----- Message from Akosua Sarabia sent at 7/9/2018  9:54 AM CDT -----  Type: Needs Medical Advice    Who Called:  [employer]/Citlali  [facility] Call Back Number: [phone]  Additional Information: Daughter is calling stating that his BiPat machine is broken. They are requesting an order for a new machine to be faxed to them, along with the medical necessity forms and clinical notes. Please fax to [phone]. They are asking for this to be sent STAT because patient cannot sleep without this machine and daughter states he will die.

## 2018-07-09 NOTE — TELEPHONE ENCOUNTER
----- Message from Lisha Velasco sent at 7/9/2018  8:43 AM CDT -----  Type: Needs Medical Advice    Who Called:  Citlali/ daughter  Symptoms (please be specific):  Fluttering in chest (no pain)   How long has patient had these symptoms:  2-3 x d  Pharmacy name and phone #:  [phone]      Best Call Back Number: Additional Information: please advise

## 2018-07-09 NOTE — TELEPHONE ENCOUNTER
Spoke with Citlali and informed her that per Karlie Moya, RN with [facility] they would need to contact [facility].  I gave her the number I was given [phone].

## 2018-07-09 NOTE — TELEPHONE ENCOUNTER
I spoke with [facility].  I was told that Mr. Bustos received a C-PAP from them on 7/12/13. Because it is 5 years old he qualifies for a new machine.  They would like an order for a new C-PAP 13 cm H2O and supplies.

## 2018-07-09 NOTE — TELEPHONE ENCOUNTER
----- Message from Lisha Velasco sent at 7/9/2018  8:45 AM CDT -----  Type: Needs Medical Advice    Who Called:  [phone] / venkata / Zabrina     Symptoms (please be specific):  By pap machine has broken ...  How long has patient had these symptoms:  Not sure next steps  Pharmacy name and phone #:   [facility] Call Back Number: [phone]     Additional Information: not sure how to get it replaced

## 2018-07-09 NOTE — TELEPHONE ENCOUNTER
Called and spoke with Citlali, pts daughter. She stated that her dad was c/o feeling a flutter in his chest but denies and CP or SOB. I asked if he has a hx of Afib and she stated she didn't think so. I looked in his chart and he has a dx of PAF. I advised her I would send a message to Dr. Tate and find out if he want Mr. Bustos to come in for a ECG, and give her a call back. She verbalized understanding. No further issues noted.

## 2018-07-10 NOTE — TELEPHONE ENCOUNTER
Order printed for CPAP machine and supplies. One note said CPAP (Adeline) and another said BiPap. Done for CPAP.

## 2018-07-10 NOTE — TELEPHONE ENCOUNTER
Faxed orders, notes, and medical necessity form to [facility] [employer].   Called pt to inform of above information. Spoke to Citlali pts daughter. Informed her necessary documents have been faxed. Citlali verbalized understanding with no further questions.

## 2018-07-11 DIAGNOSIS — I48.0 PAF (PAROXYSMAL ATRIAL FIBRILLATION): Primary | ICD-10-CM

## 2018-07-11 NOTE — TELEPHONE ENCOUNTER
Carlita and spoke with lisa, Pts daughter. I advised her per Dr. Tate that he can do either a 48 hr holter monitor or a 30 day event monitor prior to seeing Dr. Tate in the office. She stated that he can do the 48 hr holter tomorrow since he is scheduled to see Dr. Tate on 7/26/18. I scheduled his appt for tomorrow to have his holter placed. She verbalized understanding. No further issues noted.

## 2018-07-12 ENCOUNTER — CLINICAL SUPPORT (OUTPATIENT)
Dept: CARDIOLOGY | Facility: CLINIC | Age: 73
End: 2018-07-12
Attending: INTERNAL MEDICINE
Payer: MEDICARE

## 2018-07-12 ENCOUNTER — OFFICE VISIT (OUTPATIENT)
Dept: UROLOGY | Facility: CLINIC | Age: 73
End: 2018-07-12
Payer: MEDICARE

## 2018-07-12 VITALS
SYSTOLIC BLOOD PRESSURE: 117 MMHG | DIASTOLIC BLOOD PRESSURE: 63 MMHG | BODY MASS INDEX: 42.82 KG/M2 | HEART RATE: 50 BPM | TEMPERATURE: 98 F | WEIGHT: 289.13 LBS | HEIGHT: 69 IN

## 2018-07-12 DIAGNOSIS — N32.81 OVERACTIVE BLADDER: ICD-10-CM

## 2018-07-12 DIAGNOSIS — C61 PROSTATE CANCER: Primary | ICD-10-CM

## 2018-07-12 DIAGNOSIS — I48.0 PAF (PAROXYSMAL ATRIAL FIBRILLATION): ICD-10-CM

## 2018-07-12 PROCEDURE — 3074F SYST BP LT 130 MM HG: CPT | Mod: CPTII,S$GLB,, | Performed by: UROLOGY

## 2018-07-12 PROCEDURE — 93224 XTRNL ECG REC UP TO 48 HRS: CPT | Mod: S$GLB,,, | Performed by: INTERNAL MEDICINE

## 2018-07-12 PROCEDURE — 3078F DIAST BP <80 MM HG: CPT | Mod: CPTII,S$GLB,, | Performed by: UROLOGY

## 2018-07-12 PROCEDURE — 99214 OFFICE O/P EST MOD 30 MIN: CPT | Mod: 25,S$GLB,, | Performed by: UROLOGY

## 2018-07-12 PROCEDURE — 99999 PR PBB SHADOW E&M-EST. PATIENT-LVL III: CPT | Mod: PBBFAC,,, | Performed by: UROLOGY

## 2018-07-12 PROCEDURE — 81002 URINALYSIS NONAUTO W/O SCOPE: CPT | Mod: S$GLB,,, | Performed by: UROLOGY

## 2018-07-12 RX ORDER — CALCIUM CARBONATE 400(1000)
1 TABLET,CHEWABLE ORAL DAILY
Qty: 30 EACH | Refills: 11 | Status: ON HOLD | OUTPATIENT
Start: 2018-07-12 | End: 2020-11-17

## 2018-07-12 NOTE — PROGRESS NOTES
[facility] Progress Note - [place]  Urology Group: Isa/Brijesh/Becca/Galina  PCP: Dr.James newcomb [facility]    Chief Complaint: Follow-up      Subjective:        HPI: Richard Bustos is a 72 y.o. male presents with          Prostate cancer, Gl 4+4 s/p XRT  completed in 2016+ 2 years ADT  Pt was referred to  for Gl 4+4, T2c, N0M0 prostate cancer in June 2016. Decided on radiation with concurrent ADT x 2 years, first Trelstar given 6/2016.. Underwent gold seed on 8/1/16. Pt completed 7560 Gy, 42 fractions of IRT (9/1/16-10/31/16).  Last saw  4 months ago  Most recent psa <0.01 on 7/5/18    On calcium and fosamax     He's having back pain but has chronic back pain and had injections in January.   +fatigue, otherwise no complaints    psa hx   7/5/18  <0.01, T 11  1/12/18 Bone density scan normal.   1/4/18  <0.01, T 14 - administered trelstar, started Fosamax  6/12/17 <0.01, T 13 - administered trelstar, started ca/vitD  12/12/16 <0.01 - adminstered trelstar  10/31/16 Completed radiation   8/1/16  3.3 - underwent goldseed  6/2016  adminstered trelstar  5/23/16 trus bx: Gl 4+4, volume 49.6g. Bone scan and CT scan negative  3/18/16 7.7  2/3/15   6.1  8/22/14  5.3  9/26/11  3.9  8/26/10  3.0      oab  -He states he tolerated the radiation but when I saw him in December he was having Frequency q2-3 hours, UUI that occured 3-4x a daily requiring 1 pad a day. Not on any med. Denies straining. Good stream.   -I started him on myrbetriq 50mg once a day in 2017 which improved his oab sx - no longer needed pads, UUI decreased to 2x a month (uses a cane). But he stopped and was Voiding q1-2 hours and +UUI 2x day, I restarted myrbetriq 50mg on 1/18/18    Voids about 3x a day now, rarely leaks and pad for safety. Takes lasix twice a week.. Drinks decaf in the morning, no caffeine.  Denies any weak stream. Denies hesistancy.     AUA ssx:(2 incomplete emptying, 2  "frequency, 1 intermittency, 1 urgency, 2 weak stream, 1 straining, 2 sleeping). 7. QOL: mostly satisfied        Right renal hemhorragic cysts  rbus 1/18/17 confirmed these were cysts      REVIEW OF SYSTEMS:  General ROS: no fevers, no chills  Psychological ROS: no depression  Endocrine ROS: no heat or cold  Respiratory ROS: no SOB  Cardiovascular ROS: no CP, + easy bruising  Gastrointestinal ROS: no abdominal pain, no constipation, no diarrhea, no BRBPR  Musculoskeletal ROS: no muscle pain  Neurological ROS: no headaches  Dermatological ROS: no rashes  HEENT: +glasses, no sinus   ROS: per HPI    The past medical, surgical, social and family hx were reviewed. There have not been any changes.    Cataracts? removed    Urologic meds: myrbetriq 50mg daily   Anticoagulation: Yes - coumadin for hypercoagulable state and plavix    Objective:     Vitals:    07/12/18 0939   BP: 117/63   Pulse: (!) 50   Temp: 97.8 °F (36.6 °C)          Physical Exam   Constitutional: He is oriented to person, place, and time. He appears well-developed and well-nourished. He is cooperative. No distress.   HENT:   Head: Normocephalic and atraumatic.   Eyes: Pupils are equal, round, and reactive to light.   Cardiovascular: Normal rate and regular rhythm.    Pulmonary/Chest: Effort normal.   Abdominal: Soft. Normal appearance.   Musculoskeletal: Normal range of motion.   Neurological: He is alert and oriented to person, place, and time. He has normal reflexes.   Skin: Skin is intact.     Psychiatric: He has a normal mood and affect.         Urinalysis: 1.015/5/2+protein  Urine history:  4/12/18 Ng, void: 2+prot/tr blood- no dysuria "smell strong"    Labs:  9/19/17 Cr 1.6, seen by     Path:     LEFT    RIGHT  BASE   3+3 (1/2),  4+3 (1/2)     MID    4+4 (1/2)    b9  APEX    b9      b9   Date of Biopsy: 5/23/16  PSA: 7.7  Volume: 49.6  Prostate nodule: no    Rads:   DEXA bone density done for ADT 1/12/18: Normal bone mineral density of the " of the lumbar spine.    1/18/17 rbus  Multiple bilateral renal cysts  4 right renal hemhorragic cysts - 1.8 cm, 6.8 cm, 4.5 cm and 1.6     6/14/16   ctap   Bilateral renal masses  No significant adenopathy  8cm complex fluid collection    Bone scan 6/14/16  Negative    Assessment:       1. Prostate cancer        Plan:     Overactive bladder and urge incontinence  -doing well on oab med   -continue myrbetriq 50mg once daily, refill given for 1 year   -avoid all caffeinated drinks    Prostate cancer, Gl 4+4 treated with radiation and ADT  -psa, T and ADT 6 month (last shot will be in July 2018). Will need to get approved and give in 2 weeks  -ADT x 2 years (last one will be July 2018)  -continue Calcium, fosamax 1x a week (which includes vit D) for 6 more months   -psa 3 months after we stop trelstar for 2 years then every 6 months for 3 years then yearly indefinitely.   -psa in 3 months and 6 months and f/u after 6 month psa    hemhorragic renal cyst  - rbus 1/2017 done and these were confirmed to be multiple right renal hemhorragic cysts    -F/u 2 weeks nurse visit for trelstar   -psa in 3 months and 6 months and f/u after 6 month psa

## 2018-07-12 NOTE — PATIENT INSTRUCTIONS
Patient Instructions (MUST READ this part and have family member review with you)  Medicines continued today:   1. Fosamax (alendronate) once daily to keep bones strong while on anti-hormone therapy - refill given for 6 more months  2. Calcium 1000mg daily to keep bones strong while anti-hormone therapy  3. myrbetriq 50mg once daily for overactive bladder - refill given for a year    Medicines discontinued today: none    Imaging ordered (to be done in radiology at the hospital): nnone. When?: . Why?:    Blood tests ordered (to be done at hospital):psa. When?: 3 months and 6 months from now. Why?:ensure prostate cancer returning    Other instructions:   1. Return in 2 weeks nurse visit for trelstar injection, last one (anti-hormone medicine)  2. psa in 3 months, psa in 6 months and follow up after 6 months psa    Follow up: 6 months with psa beforehand. If you do not receive a request for a follow-up appointment or no follow-up appointment has been made please make sure to contact us.    Detailed information about your medical conditions you have been diagnosed and treated for today. Please feel free to contact us with any questions about this.       Overactive bladder and urge incontinence  -doing well on oab med   -continue myrbetriq 50mg once daily, refill given for 1 year   -avoid all caffeinated drinks    Prostate cancer, Gl 4+4 treated with radiation and ADT  -psa, T and ADT 6 month (last shot will be in July 2018). Will need to get approved and give in 2 weeks  -ADT x 2 years (last one will be July 2018)  -continue Calcium, fosamax 1x a week (which includes vit D) for 6 more months     hemhorragic renal cyst  - rbus 1/2017 done and these were confirmed to be multiple right renal hemhorragic cysts    -F/u 2 weeks nurse visit for trelstar   -psa 3 months after we stop trelstar for 2 years then every 6 months for 3 years then yearly indefinitely.

## 2018-07-13 ENCOUNTER — OFFICE VISIT (OUTPATIENT)
Dept: PODIATRY | Facility: CLINIC | Age: 73
End: 2018-07-13
Payer: MEDICARE

## 2018-07-13 DIAGNOSIS — B35.1 ONYCHOMYCOSIS DUE TO DERMATOPHYTE: ICD-10-CM

## 2018-07-13 DIAGNOSIS — L97.512 ULCERATED, FOOT, RIGHT, WITH FAT LAYER EXPOSED: ICD-10-CM

## 2018-07-13 DIAGNOSIS — E11.42 DIABETIC POLYNEUROPATHY ASSOCIATED WITH TYPE 2 DIABETES MELLITUS: ICD-10-CM

## 2018-07-13 DIAGNOSIS — I73.9 PERIPHERAL VASCULAR DISEASE: Primary | ICD-10-CM

## 2018-07-13 PROCEDURE — 99213 OFFICE O/P EST LOW 20 MIN: CPT | Mod: 25,S$GLB,, | Performed by: PODIATRIST

## 2018-07-13 PROCEDURE — 99999 PR PBB SHADOW E&M-EST. PATIENT-LVL II: CPT | Mod: PBBFAC,,, | Performed by: PODIATRIST

## 2018-07-13 PROCEDURE — 11721 DEBRIDE NAIL 6 OR MORE: CPT | Mod: Q9,59,S$GLB, | Performed by: PODIATRIST

## 2018-07-13 PROCEDURE — 3044F HG A1C LEVEL LT 7.0%: CPT | Mod: CPTII,S$GLB,, | Performed by: PODIATRIST

## 2018-07-13 PROCEDURE — 11042 DBRDMT SUBQ TIS 1ST 20SQCM/<: CPT | Mod: S$GLB,,, | Performed by: PODIATRIST

## 2018-07-13 NOTE — PROCEDURES
"Wound Debridement  Date/Time: 7/13/2018 9:46 AM  Performed by: ALBINO LARSON.  Authorized by: ALBINO LARSON     Time out: Immediately prior to procedure a "time out" was called to verify the correct patient, procedure, equipment, support staff and site/side marked as required.    Consent Done?:  Yes (Verbal)  Local anesthesia used?: No      Wound Details:    Location:  Right foot    Location:  Right 1st Metatarsal Head    Type of Debridement:  Excisional       Length (cm):  0.6       Area (sq cm):  0.3       Width (cm):  0.5       Percent Debrided (%):  100       Depth (cm):  0.2       Total Area Debrided (sq cm):  0.3    Depth of debridement:  Subcutaneous tissue    Tissue debrided:  Dermis, Epidermis and Subcutaneous    Devitalized tissue debrided:  Biofilm, Callus and Sough    Instruments:  Blade    Bleeding:  Minimal  Hemostasis Achieved: Yes    Method Used:  Pressure and Silver Nitrate  Patient tolerance:  Patient tolerated the procedure well with no immediate complications      "

## 2018-07-13 NOTE — PROGRESS NOTES
Subjective:      Patient ID: Richard Bustos is a 72 y.o. male.    Chief Complaint: No chief complaint on file.    Richard is a 72 y.o. male who presents to the clinic for evaluation and treatment of high risk feet. Richard has a past medical history of Anemia; Anemia of other chronic disease (9/13/2017); Anemia, chronic renal failure (9/13/2017); Anemia, unspecified (9/13/2017); Anticoagulant long-term use; Aorta aneurysm; Arthritis; Atrial fibrillation; CAD (coronary artery disease); CHF (congestive heart failure); Chronic kidney disease; Clotting disorder (9/13/2017); Colon polyp; Diabetes mellitus; Diabetes mellitus type II; Diverticulosis; Elevated PSA; Encounter for blood transfusion; Former smoker; GERD (gastroesophageal reflux disease); Gout; colonic polyps; Hypercoagulable state; Hyperlipidemia; Hypertension; MI, old (2010); Myocardial infarction; Prostate cancer (06/2016); Sleep apnea; and Venous stasis. The patient's chief complaint is diabetic foot ulcer, plantar right 1st mtpj.  Unknown onset - no change past two days.  Aggravated by increased weight bearing, shoe gear, pressure.  No previous medical treatment.  Dressing with nicole, band aid.  .    Thick discolored misshapen toenails all ten.  Gradual onset, worsening over past several weeks, aggravated by increased weight bearing, shoe gear, pressure.  Periodic debridement helps symptoms..   PCP: Familia Ramirez Jr, MD    Date Last Seen by PCP: No chief complaint on file.        Current shoe gear:  Affected Foot: Rx diabetic extra depth shoes and custom accommodative insoles     Unaffected Foot: Rx diabetic extra depth shoes and custom accommodative insoles    History of Trauma: negative  Sign of Infection: none    Hemoglobin A1C   Date Value Ref Range Status   05/12/2018 6.3 (H) 4.0 - 5.6 % Final     Comment:     According to ADA guidelines, hemoglobin A1c <7.0% represents  optimal control in non-pregnant diabetic patients. Different  metrics may apply to  specific patient populations.   Standards of Medical Care in Diabetes-2016.  For the purpose of screening for the presence of diabetes:  <5.7%     Consistent with the absence of diabetes  5.7-6.4%  Consistent with increasing risk for diabetes   (prediabetes)  >or=6.5%  Consistent with diabetes  Currently, no consensus exists for use of hemoglobin A1c  for diagnosis of diabetes for children.  This Hemoglobin A1c assay has significant interference with fetal   hemoglobin   (HbF). The results are invalid for patients with abnormal amounts of   HbF,   including those with known Hereditary Persistence   of Fetal Hemoglobin. Heterozygous hemoglobin variants (HbAS, HbAC,   HbAD, HbAE, HbA2) do not significantly interfere with this assay;   however, presence of multiple variants in a sample may impact the %   interference.     09/19/2017 6.7 (H) 4.0 - 5.6 % Final     Comment:     According to ADA guidelines, hemoglobin A1c <7.0% represents  optimal control in non-pregnant diabetic patients. Different  metrics may apply to specific patient populations.   Standards of Medical Care in Diabetes-2016.  For the purpose of screening for the presence of diabetes:  <5.7%     Consistent with the absence of diabetes  5.7-6.4%  Consistent with increasing risk for diabetes   (prediabetes)  >or=6.5%  Consistent with diabetes  Currently, no consensus exists for use of hemoglobin A1c  for diagnosis of diabetes for children.  This Hemoglobin A1c assay has significant interference with fetal   hemoglobin   (HbF). The results are invalid for patients with abnormal amounts of   HbF,   including those with known Hereditary Persistence   of Fetal Hemoglobin. Heterozygous hemoglobin variants (HbAS, HbAC,   HbAD, HbAE, HbA2) do not significantly interfere with this assay;   however, presence of multiple variants in a sample may impact the %   interference.     12/15/2014 4.7 4.5 - 6.2 % Final   06/17/2013 6.8 (H) 4.8 - 5.6 % Final     Comment:               Increased risk for diabetes: 5.7 - 6.4           Diabetes: >6.4           Glycemic control for adults with diabetes: <7.0  **In order to standardize %HbA1c results worldwide, as of October 11, 2010,  the %HbA1c is being calculated using the master equation recommended in the  consensus statement adopted by the ADA (American Diabetes Assoc), EASD  (European Assoc for the Study of Diabetes), IFCC (International Federation  of Clinical Chemistry and Laboratory Medicine) and IDF (International  Diabetes Federation). Result units: %HgbA1c (DCCT/NGSP).  In common with other methods, Hb A1C values may not accurately reflect mean  blood glucose in patients with hemoglobin variants (HgbF, HgbS and HgbC).  Any cause of shortened erythrocyte survival will reduce exposure of  erythrocytes to glucose with a consequent decrease in HbA1c (%) values, even  though the time-averaged blood glucose level may be elevated. Causes of  shortened erythrocyte lifetime might be hemolytic anemia or other hemolytic  diseases, homozygous sickle cell trait, pregnancy, recent significant or  chronic blood loss, etc. Caution should be used when interpreting the HbA1c  results from patients with these conditions.       ROS        Objective:      Physical Exam   Constitutional: He is oriented to person, place, and time. He appears well-developed and well-nourished. He is cooperative. No distress.   Cardiovascular:   Pulses:       Popliteal pulses are 2+ on the right side, and 2+ on the left side.        Dorsalis pedis pulses are 1+ on the right side, and 1+ on the left side.        Posterior tibial pulses are 1+ on the right side, and 1+ on the left side.   Capillary refill 3 seconds all toes/distal feet, all toes/both feet warm to touch.      Negative lymphadenopathy bilateral popliteal fossa and tarsal tunnel.      Negavie lower extremity edema bilateral.     Musculoskeletal:        Right ankle: He exhibits normal range of motion, no  swelling, no ecchymosis, no deformity, no laceration and normal pulse. Achilles tendon normal. Achilles tendon exhibits no pain, no defect and normal Chung's test results.   Lymphadenopathy: No inguinal adenopathy noted on the right or left side.   Negative lymphadenopathy bilateral popliteal fossa and tarsal tunnel.    Negative lymphangitic streaking bilateral feet/ankles/legs.   Neurological: He is alert and oriented to person, place, and time. He has normal strength. He displays no atrophy and no tremor. A sensory deficit is present. He exhibits normal muscle tone. Gait normal.   Reflex Scores:       Patellar reflexes are 2+ on the right side and 2+ on the left side.       Achilles reflexes are 2+ on the right side and 2+ on the left side.  Paresthesias, and burning bilateral feet with no clearly identified trigger or source.    Diminished/loss of protective sensation all toes bilateral to 10 gram monofilament.     Skin: Skin is warm, dry and intact. Capillary refill takes 2 to 3 seconds. No abrasion, no bruising, no burn, no ecchymosis, no laceration, no lesion and no rash noted. He is not diaphoretic. No cyanosis or erythema. No pallor. Nails show no clubbing.     Wound:  Plantar right 1st mtp    Size:    Pre:[id number]  Post: [id number]    Base:  Granular, pink with moderate serous/serosanaguinous drainage only.  No pus, tracking, fluctuance, malodor, or cardinal signs infection.    Borders:  Hyperkeratotic, debriding to flat, pink, blanchable skin edges without undermining.    Otherwise, Skin thin, shiny, atrophic, with decreased density and distribution of pedal hair bilateral, but without  minerva discoloration,  ulcers, masses, nodules or cords palpated bilateral feet and legs.    Hyperpigmentation both legs consistent with stasis.   Psychiatric: He has a normal mood and affect.             Assessment:       Encounter Diagnoses   Name Primary?    Peripheral vascular disease Yes    Diabetic polyneuropathy  associated with type 2 diabetes mellitus     Ulcerated, foot, right, with fat layer exposed     Onychomycosis due to dermatophyte          Plan:       Diagnoses and all orders for this visit:    Peripheral vascular disease    Diabetic polyneuropathy associated with type 2 diabetes mellitus    Ulcerated, foot, right, with fat layer exposed  -     X-Ray Foot Complete Right; Future    Onychomycosis due to dermatophyte      I counseled the patient on his conditions, their implications and medical management.    Debride wound.    Dressed with davy foam, football right.  Ambulate minimally in sx shoe right, DM shoes/inserts left.    Have right insert modified to better offload 1st mtp.    Adequate vitamin supplementation, protein intake, and hydration - discussed with patient.    xrays right.    With the patient's permission, I debrided all ten toenails with a sterile nipper and curette, removing all offending nail and debris.  Patient tolerated the procedure well and related significant relief.              Follow-up in about 1 week (around 7/20/2018).

## 2018-07-19 ENCOUNTER — ANTI-COAG VISIT (OUTPATIENT)
Dept: CARDIOLOGY | Facility: CLINIC | Age: 73
End: 2018-07-19

## 2018-07-19 DIAGNOSIS — Z79.01 LONG TERM (CURRENT) USE OF ANTICOAGULANTS: ICD-10-CM

## 2018-07-19 DIAGNOSIS — D68.59 HYPERCOAGULABLE STATE: ICD-10-CM

## 2018-07-19 LAB
OHS CV HOLTER LENGTH DECIMAL HOURS: 47.98
OHS CV HOLTER LENGTH HOURS: 47
OHS CV HOLTER LENGTH MINUTES: 59

## 2018-07-20 ENCOUNTER — OFFICE VISIT (OUTPATIENT)
Dept: PODIATRY | Facility: CLINIC | Age: 73
End: 2018-07-20
Payer: MEDICARE

## 2018-07-20 VITALS
WEIGHT: 289 LBS | DIASTOLIC BLOOD PRESSURE: 71 MMHG | HEIGHT: 69 IN | SYSTOLIC BLOOD PRESSURE: 130 MMHG | HEART RATE: 48 BPM | BODY MASS INDEX: 42.8 KG/M2

## 2018-07-20 DIAGNOSIS — L97.512 ULCERATED, FOOT, RIGHT, WITH FAT LAYER EXPOSED: Primary | ICD-10-CM

## 2018-07-20 DIAGNOSIS — I73.9 PERIPHERAL VASCULAR DISEASE: ICD-10-CM

## 2018-07-20 DIAGNOSIS — E11.42 DIABETIC POLYNEUROPATHY ASSOCIATED WITH TYPE 2 DIABETES MELLITUS: ICD-10-CM

## 2018-07-20 PROCEDURE — 11042 DBRDMT SUBQ TIS 1ST 20SQCM/<: CPT | Mod: S$GLB,,, | Performed by: PODIATRIST

## 2018-07-20 PROCEDURE — 99499 UNLISTED E&M SERVICE: CPT | Mod: S$GLB,,, | Performed by: PODIATRIST

## 2018-07-20 PROCEDURE — 99999 PR PBB SHADOW E&M-EST. PATIENT-LVL III: CPT | Mod: PBBFAC,,, | Performed by: PODIATRIST

## 2018-07-20 NOTE — PROCEDURES
"Wound Debridement  Date/Time: 7/20/2018 10:54 AM  Performed by: ALBINO LARSON  Authorized by: ALBINO LARSON     Time out: Immediately prior to procedure a "time out" was called to verify the correct patient, procedure, equipment, support staff and site/side marked as required.    Consent Done?:  Yes (Verbal)  Local anesthesia used?: No      Wound Details:    Location:  Right foot    Location:  Right 1st Metatarsal Head    Type of Debridement:  Excisional       Length (cm):  0.5       Area (sq cm):  0.2       Width (cm):  0.4       Percent Debrided (%):  100       Depth (cm):  0.2       Total Area Debrided (sq cm):  0.2    Depth of debridement:  Subcutaneous tissue    Tissue debrided:  Dermis, Epidermis and Subcutaneous    Devitalized tissue debrided:  Biofilm and Callus    Instruments:  Blade    Bleeding:  Minimal  Hemostasis Achieved: Yes    Method Used:  Pressure  Patient tolerance:  Patient tolerated the procedure well with no immediate complications      "

## 2018-07-20 NOTE — PROGRESS NOTES
Subjective:      Patient ID: Richard Bustos is a 72 y.o. male.    Chief Complaint: Foot Ulcer (right foot)    Richard is a 72 y.o. male who presents to the clinic for evaluation and treatment of high risk feet. Richard has a past medical history of Anemia; Anemia of other chronic disease (9/13/2017); Anemia, chronic renal failure (9/13/2017); Anemia, unspecified (9/13/2017); Anticoagulant long-term use; Aorta aneurysm; Arthritis; Atrial fibrillation; CAD (coronary artery disease); CHF (congestive heart failure); Chronic kidney disease; Clotting disorder (9/13/2017); Colon polyp; Diabetes mellitus; Diabetes mellitus type II; Diverticulosis; Elevated PSA; Encounter for blood transfusion; Former smoker; GERD (gastroesophageal reflux disease); Gout; colonic polyps; Hypercoagulable state; Hyperlipidemia; Hypertension; MI, old (2010); Myocardial infarction; Prostate cancer (06/2016); Sleep apnea; and Venous stasis. The patient's chief complaint is diabetic foot ulcer, plantar right 1st mtpj. Football CDI  Ambulating minimally in sx shoe right, DM shoes, inserts left..  xrays negative osteolysis, negative foreign body, negative gas.      PCP: Familia Ramirez Jr, MD    Date Last Seen by PCP:   Chief Complaint   Patient presents with    Foot Ulcer     right foot         Current shoe gear:  Affected Foot: Rx diabetic extra depth shoes and custom accommodative insoles     Unaffected Foot: Rx diabetic extra depth shoes and custom accommodative insoles    History of Trauma: negative  Sign of Infection: none    Hemoglobin A1C   Date Value Ref Range Status   07/19/2018 6.7 (H) 4.0 - 5.6 % Final     Comment:     ADA Screening Guidelines:  5.7-6.4%  Consistent with prediabetes  >or=6.5%  Consistent with diabetes  High levels of fetal hemoglobin interfere with the HbA1C  assay. Heterozygous hemoglobin variants (HbS, HgC, etc)do  not significantly interfere with this assay.   However, presence of multiple variants may affect accuracy.      05/12/2018 6.3 (H) 4.0 - 5.6 % Final     Comment:     According to ADA guidelines, hemoglobin A1c <7.0% represents  optimal control in non-pregnant diabetic patients. Different  metrics may apply to specific patient populations.   Standards of Medical Care in Diabetes-2016.  For the purpose of screening for the presence of diabetes:  <5.7%     Consistent with the absence of diabetes  5.7-6.4%  Consistent with increasing risk for diabetes   (prediabetes)  >or=6.5%  Consistent with diabetes  Currently, no consensus exists for use of hemoglobin A1c  for diagnosis of diabetes for children.  This Hemoglobin A1c assay has significant interference with fetal   hemoglobin   (HbF). The results are invalid for patients with abnormal amounts of   HbF,   including those with known Hereditary Persistence   of Fetal Hemoglobin. Heterozygous hemoglobin variants (HbAS, HbAC,   HbAD, HbAE, HbA2) do not significantly interfere with this assay;   however, presence of multiple variants in a sample may impact the %   interference.     09/19/2017 6.7 (H) 4.0 - 5.6 % Final     Comment:     According to ADA guidelines, hemoglobin A1c <7.0% represents  optimal control in non-pregnant diabetic patients. Different  metrics may apply to specific patient populations.   Standards of Medical Care in Diabetes-2016.  For the purpose of screening for the presence of diabetes:  <5.7%     Consistent with the absence of diabetes  5.7-6.4%  Consistent with increasing risk for diabetes   (prediabetes)  >or=6.5%  Consistent with diabetes  Currently, no consensus exists for use of hemoglobin A1c  for diagnosis of diabetes for children.  This Hemoglobin A1c assay has significant interference with fetal   hemoglobin   (HbF). The results are invalid for patients with abnormal amounts of   HbF,   including those with known Hereditary Persistence   of Fetal Hemoglobin. Heterozygous hemoglobin variants (HbAS, HbAC,   HbAD, HbAE, HbA2) do not significantly  interfere with this assay;   however, presence of multiple variants in a sample may impact the %   interference.     06/17/2013 6.8 (H) 4.8 - 5.6 % Final     Comment:              Increased risk for diabetes: 5.7 - 6.4           Diabetes: >6.4           Glycemic control for adults with diabetes: <7.0  **In order to standardize %HbA1c results worldwide, as of October 11, 2010,  the %HbA1c is being calculated using the master equation recommended in the  consensus statement adopted by the ADA (American Diabetes Assoc), EASD  (European Assoc for the Study of Diabetes), IFCC (International Federation  of Clinical Chemistry and Laboratory Medicine) and IDF (International  Diabetes Federation). Result units: %HgbA1c (DCCT/NGSP).  In common with other methods, Hb A1C values may not accurately reflect mean  blood glucose in patients with hemoglobin variants (HgbF, HgbS and HgbC).  Any cause of shortened erythrocyte survival will reduce exposure of  erythrocytes to glucose with a consequent decrease in HbA1c (%) values, even  though the time-averaged blood glucose level may be elevated. Causes of  shortened erythrocyte lifetime might be hemolytic anemia or other hemolytic  diseases, homozygous sickle cell trait, pregnancy, recent significant or  chronic blood loss, etc. Caution should be used when interpreting the HbA1c  results from patients with these conditions.       Review of Systems   Constitution: Negative for chills, fever, malaise/fatigue and night sweats.   Cardiovascular: Positive for leg swelling. Negative for claudication and cyanosis.   Skin: Positive for poor wound healing. Negative for color change, itching, rash and suspicious lesions.   Musculoskeletal: Negative for falls, gout, joint pain and myalgias.   Neurological: Positive for numbness and sensory change.           Objective:      Physical Exam   Constitutional: He is oriented to person, place, and time. He appears well-developed and well-nourished. He  is cooperative. No distress.   Cardiovascular:   Pulses:       Popliteal pulses are 2+ on the right side, and 2+ on the left side.        Dorsalis pedis pulses are 1+ on the right side, and 1+ on the left side.        Posterior tibial pulses are 1+ on the right side, and 1+ on the left side.   Capillary refill 3 seconds all toes/distal feet, all toes/both feet warm to touch.      Negative lymphadenopathy bilateral popliteal fossa and tarsal tunnel.      Negavie lower extremity edema bilateral.     Musculoskeletal:        Right ankle: He exhibits normal range of motion, no swelling, no ecchymosis, no deformity, no laceration and normal pulse. Achilles tendon normal. Achilles tendon exhibits no pain, no defect and normal Chung's test results.   Lymphadenopathy: No inguinal adenopathy noted on the right or left side.   Negative lymphadenopathy bilateral popliteal fossa and tarsal tunnel.    Negative lymphangitic streaking bilateral feet/ankles/legs.   Neurological: He is alert and oriented to person, place, and time. He has normal strength. He displays no atrophy and no tremor. A sensory deficit is present. He exhibits normal muscle tone. Gait normal.   Reflex Scores:       Patellar reflexes are 2+ on the right side and 2+ on the left side.       Achilles reflexes are 2+ on the right side and 2+ on the left side.  Paresthesias, and burning bilateral feet with no clearly identified trigger or source.    Diminished/loss of protective sensation all toes bilateral to 10 gram monofilament.     Skin: Skin is warm, dry and intact. Capillary refill takes 2 to 3 seconds. No abrasion, no bruising, no burn, no ecchymosis, no laceration, no lesion and no rash noted. He is not diaphoretic. No cyanosis or erythema. No pallor. Nails show no clubbing.     Wound:  Plantar right 1st mtp    Size:    Pre:[id number]  Post: [id number]    Base:  Granular, pink with moderate serous/serosanaguinous drainage only.  No pus, tracking, fluctuance,  malodor, or cardinal signs infection.    Borders:  Hyperkeratotic, debriding to flat, pink, blanchable skin edges without undermining.    Otherwise, Skin thin, shiny, atrophic, with decreased density and distribution of pedal hair bilateral, but without  minerva discoloration,  ulcers, masses, nodules or cords palpated bilateral feet and legs.    Hyperpigmentation both legs consistent with stasis.   Psychiatric: He has a normal mood and affect.             Assessment:       Encounter Diagnoses   Name Primary?    Ulcerated, foot, right, with fat layer exposed Yes    Diabetic polyneuropathy associated with type 2 diabetes mellitus     Peripheral vascular disease          Plan:       Richard was seen today for foot ulcer.    Diagnoses and all orders for this visit:    Ulcerated, foot, right, with fat layer exposed    Diabetic polyneuropathy associated with type 2 diabetes mellitus    Peripheral vascular disease      I counseled the patient on his conditions, their implications and medical management.    Debride wound.    Dressed with aperture,davy foam, football right.  Ambulate minimally in sx shoe right, DM shoes/inserts left.    Have right insert modified to better offload 1st mtp.    Adequate vitamin supplementation, protein intake, and hydration - discussed with patient.    With the patient's permission, I debrided all ten toenails with a sterile nipper and curette, removing all offending nail and debris.  Patient tolerated the procedure well and related significant relief.              Follow-up in about 1 week (around 7/27/2018).

## 2018-07-24 DIAGNOSIS — C61 PROSTATE CANCER: Primary | ICD-10-CM

## 2018-07-25 ENCOUNTER — TELEPHONE (OUTPATIENT)
Dept: UROLOGY | Facility: CLINIC | Age: 73
End: 2018-07-25

## 2018-07-25 NOTE — TELEPHONE ENCOUNTER
Spoke with patient's daughter informed her patient can come after appointment with  for his injection.

## 2018-07-25 NOTE — TELEPHONE ENCOUNTER
----- Message from Anisha Beltran sent at 7/25/2018  9:08 AM CDT -----  Contact: Patient's daughter, Citlali Frye  Patient's daugther is calling to see if patient's injection appointment tomorrow can be moved a little closer to his other appointment at 1:30.  Please call to discuss.  Call Back#[phone]  Thanks

## 2018-07-26 ENCOUNTER — CLINICAL SUPPORT (OUTPATIENT)
Dept: UROLOGY | Facility: CLINIC | Age: 73
End: 2018-07-26
Payer: MEDICARE

## 2018-07-26 ENCOUNTER — OFFICE VISIT (OUTPATIENT)
Dept: CARDIOLOGY | Facility: CLINIC | Age: 73
End: 2018-07-26
Payer: MEDICARE

## 2018-07-26 VITALS
DIASTOLIC BLOOD PRESSURE: 90 MMHG | HEART RATE: 60 BPM | BODY MASS INDEX: 42.8 KG/M2 | SYSTOLIC BLOOD PRESSURE: 123 MMHG | OXYGEN SATURATION: 94 % | WEIGHT: 289 LBS | HEIGHT: 69 IN

## 2018-07-26 DIAGNOSIS — I25.10 CORONARY ARTERY DISEASE INVOLVING NATIVE CORONARY ARTERY OF NATIVE HEART WITHOUT ANGINA PECTORIS: Primary | Chronic | ICD-10-CM

## 2018-07-26 DIAGNOSIS — G47.33 OBSTRUCTIVE SLEEP APNEA SYNDROME: Chronic | ICD-10-CM

## 2018-07-26 DIAGNOSIS — D68.59 HYPERCOAGULABLE STATE: ICD-10-CM

## 2018-07-26 DIAGNOSIS — I48.0 PAROXYSMAL ATRIAL FIBRILLATION: ICD-10-CM

## 2018-07-26 DIAGNOSIS — E78.00 PURE HYPERCHOLESTEROLEMIA: ICD-10-CM

## 2018-07-26 DIAGNOSIS — I25.10 CORONARY ARTERY DISEASE, ANGINA PRESENCE UNSPECIFIED, UNSPECIFIED VESSEL OR LESION TYPE, UNSPECIFIED WHETHER NATIVE OR TRANSPLANTED HEART: ICD-10-CM

## 2018-07-26 DIAGNOSIS — Z79.01 LONG TERM (CURRENT) USE OF ANTICOAGULANTS: ICD-10-CM

## 2018-07-26 DIAGNOSIS — Z98.84 H/O BARIATRIC SURGERY: ICD-10-CM

## 2018-07-26 DIAGNOSIS — E11.59 HYPERTENSION ASSOCIATED WITH DIABETES: ICD-10-CM

## 2018-07-26 DIAGNOSIS — I15.2 HYPERTENSION ASSOCIATED WITH DIABETES: ICD-10-CM

## 2018-07-26 DIAGNOSIS — N18.30 CKD (CHRONIC KIDNEY DISEASE) STAGE 3, GFR 30-59 ML/MIN: ICD-10-CM

## 2018-07-26 DIAGNOSIS — I51.9 LV DYSFUNCTION: ICD-10-CM

## 2018-07-26 DIAGNOSIS — E66.01 MORBID OBESITY: ICD-10-CM

## 2018-07-26 DIAGNOSIS — R94.39 ABNORMAL CARDIOVASCULAR STRESS TEST: ICD-10-CM

## 2018-07-26 DIAGNOSIS — C61 PROSTATE CANCER: Primary | ICD-10-CM

## 2018-07-26 DIAGNOSIS — I71.20 THORACIC AORTIC ANEURYSM WITHOUT RUPTURE: Chronic | ICD-10-CM

## 2018-07-26 DIAGNOSIS — R53.82 CHRONIC FATIGUE: ICD-10-CM

## 2018-07-26 PROCEDURE — 3074F SYST BP LT 130 MM HG: CPT | Mod: CPTII,S$GLB,, | Performed by: INTERNAL MEDICINE

## 2018-07-26 PROCEDURE — 99215 OFFICE O/P EST HI 40 MIN: CPT | Mod: S$GLB,,, | Performed by: INTERNAL MEDICINE

## 2018-07-26 PROCEDURE — 3078F DIAST BP <80 MM HG: CPT | Mod: CPTII,S$GLB,, | Performed by: INTERNAL MEDICINE

## 2018-07-26 PROCEDURE — 3044F HG A1C LEVEL LT 7.0%: CPT | Mod: CPTII,S$GLB,, | Performed by: INTERNAL MEDICINE

## 2018-07-26 PROCEDURE — 99499 UNLISTED E&M SERVICE: CPT | Mod: S$GLB,,, | Performed by: UROLOGY

## 2018-07-26 PROCEDURE — 93000 ELECTROCARDIOGRAM COMPLETE: CPT | Mod: S$GLB,,, | Performed by: INTERNAL MEDICINE

## 2018-07-26 PROCEDURE — 99999 PR PBB SHADOW E&M-EST. PATIENT-LVL III: CPT | Mod: PBBFAC,,, | Performed by: INTERNAL MEDICINE

## 2018-07-26 PROCEDURE — 99499 UNLISTED E&M SERVICE: CPT | Mod: S$GLB,,, | Performed by: INTERNAL MEDICINE

## 2018-07-26 PROCEDURE — 96402 CHEMO HORMON ANTINEOPL SQ/IM: CPT | Mod: S$GLB,,, | Performed by: UROLOGY

## 2018-07-26 NOTE — PROGRESS NOTES
Patient ID: Richard Bustos is a 69 y.o. male who presents for follow-up of Follow-up  For CAD, chronic fatigue, 6 months follow up  PCP: Dr. Ramirez, see every 4 months, does labs through [facility]  Hematologist: Dr. Quezada  Urologist: Dr. Moss  Podiatrist: Amy Lyon  Wound care: Dr. Torrez  Pain: Dr. 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 Suffered a NSTEMI in 8/2010, catheterization showed SUMMARY:   1. Three vessel coronary artery disease.   2. Successful PCI. Involved 2 JAYCEE in the LAD and OM1.    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 Off diet at times, had underwent gastri bypass in 2008.    Last Echo in 2010 showed CONCLUSIONS   1 - Mild left ventricular enlargement.   2 - Normal left ventricular function (EF 58%).   3 - Diastolic dysfunction.   4 - Biatrial enlargement.   5 - Mildly to moderately enlarged ascending aorta.    Last carotid US: 9/13/2010  Bilateral 20%-40% narrowing.  No symptoms.    Coronary Artery Disease  Presents for follow-up visit. Pertinent negatives include no chest pain, dizziness, leg swelling, muscle weakness, palpitations, shortness of breath or weight gain. Risk factors include hyperlipidemia. The symptoms have been resolved. Compliance with diet is variable. Compliance with exercise is good. Compliance with medications is good.   Hyperlipidemia  Pertinent negatives include no chest pain, focal weakness, myalgias or shortness of breath.     EKG shows sinus  "bradycardia, rate 45, today NSR, 75, VPC, and nonspecific STTW abnormalities.     Since visit of 7/2012, have lots of stress with tenants, BP noted to be elevated, log reviewed 153-174/, HR 52-74. Had cardiac testing -   ECHO CONCLUSIONS 7/2012   1 - Mild left ventricular enlargement.   2 - Mildly to moderately depressed left ventricular function (EF 40%).   3 - Eccentric hypertrophy.   4 - Mildly to moderately enlarged ascending aorta.   5 - Mild left atrial enlargement.   6 - Normal diastolic function.   7 - Mild aortic regurgitation.   8 - Suggestion for inferoposterior hypokinesia.   9 - Compare to 8/17/2010, there is increase in LVH with decrease in   LVEF from 58%, and new inferoposterior hypokinesia. The Aortic dimensions   have not changed.    Carotid US, 7/2012  IMPRESSION   Findings consistent with less than 50% diameter stenosis of proximal   internal carotid arteries by NASCAET criteria. Flow in the vertebral   arteries appears antegrade bilaterally.    Since visit of 1/23, still with occasional angina, average once a week, lasting about a minute, "light" grade 3-4/10.  Workup:  ECHO, 2/5/20163, CONCLUSIONS   1 - Mild left ventricular enlargement.   2 - Mildly to moderately depressed left ventricular function (EF 40%).   3 - Normal diastolic function.   4 - Inferoposterior hypokinesia consistent with ischemic disease..   5 - Moderately enlarged ascending aorta. 4.4 cm - stable   6 - No significant change from study of 7/17/2012.    Lexiscan, 2/5/2013  Nuclear Quantitative Functional Analysis:   LVEF: 34 % (normal is 47 - 59)   LVED Volume: 131 ml (normal is 91 - 155)   LVES Volume: 87 ml (normal is 40 - 78)   Impression: ABNORMAL MYOCARDIAL PERFUSION   1. There is evidence for mild to moderate myocardial ischemia in the   lateral apical wall of the left ventricle with associated stress induced   LV cavity dilatation with underlying injury present.   2. There is moderate intensity fixed defect in " the inferolateral wall of   the left ventricle, consistent with myocardial injury. There is trivial   francine-injury ischemia.   3. There is abnormal wall motion at rest showing akinesis of the lateral   wall of the left ventricle, moderate global hypokinesis of the left   ventricle. dyskinesis of the inferolateral wall of the left ventricle.   4. There is resting LV dysfunction with a reduced ejection fraction of 34   %. (normal is 47 - 59)   5. The left ventricular volume is mildly increased at rest.   6. The extracardiac distribution of radioactivity is normal.  7. [facility] SSS =15, SRS =10, SDS =5, suggest 6.25% of myocardium may be   ischemic.    Since visit of 2/8, noticed some bilateral leg weakness, post bilateral THR, also occasional charley horse at night, mostly right sided. Today had mild chest pain, grade 2/10, while driving here. Claims to have not heard from [facility], INR on 2/18 was 1.8. Question about Plavix answered. Discussed with daughter, Citlali, over the telephone.     Since visit 2/22, no new problem. Discussed aortic aneurysm, will need at least annual imaging, do not recall the last time. Have claustrophobia requesting Xanax. No problem with the medications, stays very active. Weight reviewed was below 290 lbs in 12/2011.   Apparently likes Arcadio rincon!     Since visit of 3/16, had MRA done with use of Xanax and hydrocodone,   Result - Max ascending aorta 4.3 cm stable.  Tolerating medications without problem, using BiPAP nightly, still lot of stress with Rental Properties. Discuss need for exercise program with weigh reduction of 10%. New problem include left leg weakness with localized pain behind the knee. For about 2 weeks, been dancing with new woman.    Since visit of 4/11/2013, hospitalized 2 weeks ago at [facility] for large hematoma due to use of Lovenox as bridge for oral surgery. Coumadin on hold, seems to be getting smaller and softer. Denies any heart  symptoms or CP nor SOB. No problem with taking medications and using CPAP. Slowed down due to leg pain and fatigue. Feels good in the AM.    Since visit of 4/9/2014, complain of leg weakness, noted since 2/2013. Denies any CP nor SOB. Miss 2-3 doses of medications monthly. No other problem with medications. Awaken all night due to dog, sleep 10-4. Feels tired in the morning despite using CPAP. Have not had much blood work done. ECG shows AF rate 67, RBBB.  ECHO in 5/2014 CONCLUSIONS   1 - Enlarged aortic root. measuring 3.9 cm at sinotubular junction.  2 - Biatrial enlargement.   3 - Eccentric hypertrophy.   4 - Mildly to moderately depressed left ventricular systolic function (EF 40-45%).   5 - Normal left ventricular diastolic function.   6 - Right ventricular enlargement with normal systolic function.   7 - No significant change from echo on 2/5/2013..   8 - Difficult apical window, Optison contrast used for wall motion analysis..     Since visit of 11/21, no new problem. Some concern of HR down to 40 after exercise. Noted easy fatigue but remains quite sedentary. Discussed need for Coreg titration for LV dysfunction. Labs reviewed, CKD eith eGFR of 47, mild anemia Hgb 12.1, , A1C 4.7, and proteinuria.   Cardiac workup:  Carotid US, 12/2014 - CONCLUSIONS   There is 0 - 19% right Internal Carotid stenosis.  There is 0 - 19% left Internal Carotid stenosis.    Clean vessels    Lexiscan Nuclear Quantitative Functional Analysis:   LVEF: 38 % (normal is 47 - 59)  LVED Volume: 172 ml (normal is 91 - 155)  LVES Volume: 107 ml (normal is 40 - 78)    Impression: ABNORMAL MYOCARDIAL PERFUSION  1. There is evidence for mild to moderate myocardial ischemia in the inferior and lateral walls of the left ventricle with associated stress induced LV cavity dilatation with underlying injury present.   2. There is moderate to severe intensity fixed defect in the inferolateral wall of the left ventricle, consistent with  myocardial injury.   3. There is abnormal wall motion at rest showing moderate global hypokinesis of the left ventricle. severe hypokinesis of the inferior and septal walls of the left ventricle.   4. There is resting LV dysfunction with a reduced ejection fraction of 38 %. (normal is 47 - 59)  5. The left ventricular volume is moderately increased at rest.   6. The extracardiac distribution of radioactivity is normal.   7. When compared to the previous study from 02/05/2013, no significant changes noted.  8. [facility] SSS=15, SRS=8, and SDS=7, suggest that 8.7% of myocardium may be ischemic.    Holter, 12/2014:  PREDOMINANT RHYTHM  1. Sinus arrhythmia with heart rates varying between 41 and 110 bpm with an average of 64 bpm.     VENTRICULAR ARRHYTHMIAS  1. There were frequent mostly monomorphic PVCs totalling 1297 and averaging 54 per hour. There was 1 couplet.    2. 1.6% of complexes.    3. There were no episodes of ventricular tachycardia.    SUPRA VENTRICULAR ARRHYTHMIAS  1. There were rare PACs totalling 108 and averaging 4 per hour.     2. There were no episodes of sustained supraventricular tachycardia.    SINUS NODE FUNCTION  1. There was no evidence of high grade SA sang block.     AV CONDUCTION  1. There was no evidence of high grade AV block.     DIARY  1. The diary was not returned    MISCELLANEOUS  1. There were persistent hookup related artifacts. Overall, the study was of good quality.     2. This was a tape of adequate length (24 hrs).     Since visit of 12/23/2014, underwent LHC with PCI of RCA. Off ASA due to rectal bleed. Discuss use of triple Rx for hopefully 3 months post JAYECE implant. Feeling more energetic, no CP nor SOB. Discussed ascending Aortic aneurysm, last checked in 5/2014 with Echo - stable.   HEMODYNAMIC RESULTS:    LVEDP (Pre): 16 mmHg  LVEDP (Post): 18 mmHg  Ejection Fraction: 40%  Global LV Function: moderately depressed  BP: 109/70  HR: 96    Air rest:  LVEDP: 18    C.  ANGIOGRAPHIC RESULTS:    DIAGNOSTIC:  Patient has a right dominant coronary artery.     - Left Main Coronary Artery:  The LM is normal. There is DAREK 3 flow.    - Left Anterior Descending Artery:  The mid LAD has a 50% stenosis. There is DAREK 3 flow. The remaining portion of the vessel has multiple lesions < 50% stenosed. Long stent noted in mid-LAD, 50% ISR.    - Left Circumflex Artery:  The LCX has luminal irregularities. There is DAREK 3 flow.    - Right Coronary Artery:  The mid RCA has a 75% stenosis. There is DAREK 3 flow. The remaining portion of the vessel has luminal irregularities. Appears to be close to distal end of previously placed stent.    - Aortic Root:  The Aortic Root is normal. There is DAREK 3 flow.      D. SUMMARY:    1. Two vessel coronary artery disease.  2. Diastolic dysfunction.  3. - Very difficult case due to tortuosity of the innominate artery, multiple manipulation needed for cannulation of CA.  4. - RCA lesion appears as a large plaque with significant luminal stenois.    E. RECOMMENDATIONS:    1. Continue medical management.  2. Cardiac rehab referral.  3. Weight loss.  4. Follow up with Dr. Familia Tate.  5. Schedule for PCI.  6. - Hydration overnight, eGFR 47.  7. - Dr. Gallegos consulted for PCI of RCA  8. - On chronic warfarin Rx, on hold for now  9. - Start  mg today and daily for total of 5 days as warfarin is resumed post PCI  10. - Start Plavix, load with 300 mg today then 75 mg daily in addition to warfarin.    PCI INTERVENTION:    Proximal RCA:  The lesion was successfully intervened. Post-stenosis of 0% and post-DAREK 3 flow. The vessel was accessed natively. The following items were used: Stent Resolute Rx 4.00x30 (JAYCEE).    C. SUMMARY:    1. Successful PCI/JAYCEE of the proximal RCA.    D. RECOMMENDATIONS:    1. Routine post PCI care.  2. Risk factor reductions.  3. ASA 81mg.  4. Clopidogrel (Plavix) for one year.  5. Weight loss.    Since visit of 1/22/2015, no CP nor SOB.  "Been compliant with medications, no problem. Noted venous ulcer in the left leg about a week ago. Dressing with peroxide. Lab from [facility] reviewed, K+ remains 5.5 with stable Cr of 1.78, eGFR 39. Active with rental properties upkeep.     Since visit of 3/27, no new problem, difficulties in getting lab results from [facility]. Reviewed eGFR 39, K+ 5.5, will need to stay with low K+ diet. Problem with venous stasis ulceration in the left leg. Worked with Wound Care for 2 weeks, told to return if problem.     Since visit of 4/23,no new problem, feeling "pretty good". In process of selling rental properties. Active with walking about 4 miles a day, takes about an hour. Legs better with ACE stocking. Review lab, BMP on 5/15/2015 K+ 4.7, stable renal function with eGFR 41, CBC in 1/2015 Hgb 11.8, last Ferritin level in 4/2011.  ECHO, 5/2015, CONCLUSIONS   1 - Moderately enlarged aortic root. measuring 4.1 cm at sinotubular junction.  2 - Concentric hypertrophy. Wall thickness is increased, with the septum and the posterior wall each measuring 1.4 cm across.  3 - Low normal to mildly depressed left ventricular systolic function (EF 50-55%).   4 - Normal left ventricular diastolic function.   5 - Right ventricular enlargement with normal systolic function.   6 - Difficult windows, Optison contrast used for wall motion analysis.   7 - Suggestion for some improvement in LVEF from Echo on 5/7/2014.     Since visit of 5/29/2015, more active, compliant with medications, PT twice weekly for an hour, no problem. Last BMP in 5/2015, K+ 4.7, Cr 1.7, eGFR 41. Under care of Dr. Torrez for venous stasis and ulcer. Uses support hose occasionally due to the hot weather.    Since visit of 9/10/2015, no new problem, less stress, no regular exercise. Had completed phase II Cardiac Rehab. Asked to consider phase III. Home BP is good, 135. Citlali fixes medications, don't skip.    Since visit of 2/8/2016, no new problem. Undergoing urologic evaluation " for kidney mass with biopsies of the bladder, prostate, and the right kidney, no problem with the procedure, awaiting results. ECG shows RBBB, no problem with medications, no regular exercise but considering to start. Decline stress test, no CP nor SOB.     In 12/2016, first concern is chronic fatigue, received 42 radiation Rx, have nocturia 3X nightly which interrupt the sleep, gets only about 5 hours. Will review with upcoming visit with Urologist. Stay active, able to walk 2 blocks with can, also uses free weights 2X weekly, for 15-20 minutes. Have DORA, uses CPAP 100%. For past 2 months had 2 episode of chest pain under emotional stress, last up to 15 minutes, max grade 3/10, have not use any NTG. Chart reviewed - last nuclear stress test in 12/2014. Had JAYCEE placed in 1/2015. Last lipid is excellent, LDL 57, non-HDL 75. Weight stable at around 294 lbs.   Echo, 6/2016 CONCLUSIONS     1 - Mild left ventricular enlargement.     2 - Eccentric hypertrophy.     3 - Moderately depressed left ventricular systolic function (EF 35-40%).     4 - Normal left ventricular diastolic function.     5 - Right ventricular enlargement with normal systolic function.     6 - The estimated PA systolic pressure is greater than 25 mmHg.     7 - Mild tricuspid regurgitation.     8 - Trivial to mild aortic regurgitation.     9 - Suggestion for deterioration in LVEF from Echo in 5/2015.     10 - Difficult windows, Optison contrast used for wall motion analysis.     Carotid US  Consider repeat study in six months.    CONCLUSIONS   There is 40 - 49% right Internal Carotid stenosis.  There is 20 - 39% left Internal Carotid stenosis.    Antegrade flows in the vertebral arteries. Homogenous plaques noted in both ICAs.    In 2/2017, active with rental, feels tired all the time, using CPAP 100%, wake up feeling all right then tired after 2-3 hours. Was using hydrocodone bid for chronic pains, out of medications for 3 weeks, feels more tired off  the narcotic. Explained need for regular exercise with muscle strengthening.  Lexiscan - Nuclear Quantitative Functional Analysis:   LVEF: 42 % (normal is 47 - 59)  LVED Volume: 124 ml (normal is 91 - 155)  LVES Volume: 72 ml (normal is 40 - 78)    Impression: ABNORMAL MYOCARDIAL PERFUSION  1. There is moderate intensity fixed defects in the lateral and inferolateral walls of the left ventricle, consistent with myocardial injury. There is trivial francine-injury ischemia.   2. There is abnormal wall motion at rest showing moderate hypokinesis of the inferolateral and inferior walls of the left ventricle.   3. There is resting LV dysfunction with a reduced ejection fraction of 42 %.  (normal is 47 - 59)  4. The ventricular volumes are normal at rest and stress.   5. The extracardiac distribution of radioactivity is normal.   6. When compared to the previous study from 12/15/2014, the inferolateral defects now appears fixed.  7. [facility] SSS=15, SRS=14, and SDS=1, suggest that only 1% of myocardium may be ischemic.    Carotid US - CONCLUSIONS   There is 20 - 39% right Internal Carotid stenosis.  There is 0 - 19% left Internal Carotid stenosis.    Homogenous plaques noted in the ICAs, antegrade flows in the vertebral  arteries.    In 7/2017, here supposedly for HTN but not certain, also planning to do RF nerve ablation next Thursday with Dr. Causey, no surgical clearance requested. BP in PCP office was 112/60. Compliant with medications. Also have started using BiPAP every night for DORA, feel better. Denies any CP nor SOB. Active with property management and HA. ECG shows NSR, RBBB.     In 1/2018, no cardiac symptom, no heart worries. Very few home BP. Remain active using the cane, limited by loss of balance due to neuropathy of the LE. Fallen twice, due to the shoe, uses cane as needed. One episode, hit nose with bleeding. Labs reviewed from 9/2017: LDL 51.4, A1C 6.7%, anemia Hgb 9.8, CKD stage 3, eGFR 43   Echo in 8/2017  - CONCLUSIONS     1 - Eccentric hypertrophy.     2 - Moderately depressed left ventricular systolic function (EF 35-40%).     3 - Regional wall motion analysis consistent with ischemic disease.     4 - Impaired LV relaxation, normal LAP (grade 1 diastolic dysfunction).     5 - Trivial to mild mitral regurgitation.     6 - Right ventricular enlargement with normal systolic function.     7 - The estimated PA systolic pressure is 26 mmHg.     8 - No definite change from Echo in 6/2016.     HPI Comments: in 7/2018, occasional electricity in the heart, very brief. ECG in NSR, 62 bpm, PVC and RBBB. No CP, stay active dealing with a lot friends' problem, he is the problem solver, very stressful.  Carotid US 2/2017  CONCLUSIONS   There is 0 - 19% right Internal Carotid stenosis.  There is 0 - 19% left Internal Carotid stenosis.    Homogenous plaques in the ICAs with antegrade flows in the vertebral arteries.    Holter - Conclusion   · NSR with occasional mostly monomorphic PVC, 3000, about 2% of complexes, rare PAC  · No symptom reported              Review of Systems   Constitution: Negative for diaphoresis, fever, some weakness, and malaise/fatigue, no night sweats and but weight loss of 13 lbs since 1/2018 from cutting down eating.  HENT: Negative for headaches, nosebleeds and tinnitus.   Eyes: Negative for visual disturbance.   Cardiovascular: Positive for dyspnea on exertion and chest pain with emotional stress. Negative claudication, cyanosis, irregular heartbeat, leg swelling, near-syncope, orthopnea, palpitations and paroxysmal nocturnal dyspnia.   Respiratory: Negative for cough, shortness of breath, sleep disturbances due to breathing, snoring and wheezing. Voorhees score 13 down to 11  Endocrine: Negative for polydipsia and polyuria.   Hematologic/Lymphatic: Does not bruise/bleed easily.   Skin: Negative for nail changes, color change, flushing, poor wound healing and suspicious lesions. ecchymosis on ASA  "especially with the cat, and warfarin  Musculoskeletal: Positive for arthritis, back pain and muscle cramps on daily Xanax 1 mg. Negative for falls, gout, joint pain, joint swelling,   Bilateral hip replacements (right 1996, left 2001), right knee arthroscopic procedure 1996.  Have muscle weakness and myalgias in hip and legs, also muscle cramps in the hands, and legs at night.  Gastrointestinal: Negative for heartburn, hematemesis, hematochezia, melena and nausea. Have no bloating  Neurological: Negative for disturbances in coordination, have excessive daytime sleepiness, rare dizziness, focal weakness in both LE, occasional light-headedness, have numbness, occasional loss of balance, no vertigo.   Psychiatric/Behavioral: Negative for depression and substance abuse. The patient is nervous/anxious and stressed with rentals. The patient does not have insomnia.        Objective:     Physical Exam   Constitutional: He is oriented to person, place, and time. He appears well-developed and well-nourished.   HENT:   Head: Normocephalic.   Eyes: Conjunctivae and EOM are normal. Pupils are equal, round, and reactive to light.   Neck: Normal range of motion. Neck supple. No JVD present. No thyromegaly present.   Cardiovascular: regular rate, regular rhythm, soft heart sounds and intact distal pulses. Exam reveals no gallop and no friction rub.   No murmur heard.  No swelling.   Pulmonary/Chest: Effort normal. He has no wheezes. He has no rales. He exhibits no tenderness.   Abdominal: Soft. Bowel sounds are normal. There is no tenderness.  Protuberance, waist circumference 57.5 inches increased to 58" , hip 55 inches.   Musculoskeletal: Normal range of motion. He exhibits No tenderness (mild behind the left knee.). He exhibits 1+ edema in left lower extremities to the knee.   Lymphadenopathy:   He has no cervical adenopathy.   Neurological: He is alert and oriented to person, place, and time.   Skin: Skin is warm and dry. No " rash noted.   Venous stasis, bilateral with stasis dermatitis with multiple scratches.        Assessment:        Plan:      Richard was seen today for cardiac follow up    Diagnoses and associated orders for this visit:    Coronary artery disease involving native coronary artery of native heart without angina pectoris    Coronary artery disease, angina presence unspecified, unspecified vessel or lesion type, unspecified whether native or transplanted heart  -     EKG 12-lead    Hypertension associated with diabetes  -     Transthoracic echo (TTE) complete; Future    Pure hypercholesterolemia    Hypercoagulable state, Prothrombin mutation    Morbid obesity, today down BMI 42.7    CKD (chronic kidney disease) stage 3, GFR 30-59 ml/min, 41    LV dysfunction, EF 40%  -     Transthoracic echo (TTE) complete; Future    Abnormal cardiovascular stress test    Long term (current) use of anticoagulants    H/O bariatric surgery, 2008    Paroxysmal atrial fibrillation  -     Transthoracic echo (TTE) complete; Future    Chronic fatigue    Obstructive sleep apnea syndrome    Thoracic aortic aneurysm without rupture  -     Transthoracic echo (TTE) complete; Future    - All medical issues reviewed, continue current Rx.  - CV status stable, continue current Rx, all medications reviewed, patient acknowledge good understanding.  - Check home blood pressure, 2 days weekly, do 2 readings within 5 minutes in AM and PM, keep log for review.  - Weigh twice weekly, try to lose 1-2 lbs per week  - Highly recommend 30 minutes of exercise daily, can have Sunday off, with 2-3 sessions of muscle strengthening weekly. A  would be very helpful.  - Recommend at least biannual cardiovascular evaluation in view of his significant risk factors. Patient's preference  - Phone review / [facility]    Venous stasis with ulcers - stable    Sedentary lifestyle - improved    Patient Active Problem List   Diagnosis    Diabetes mellitus with  chronic kidney disease, stage III    MTHFR mutation (methylenetetrahydrofolate reductase)    Sleep apnea, using BiPAP nightly, 1999, last sleep test in 2013    Hypertension associated with diabetes    CAD (coronary artery disease), NSTEMI, JAYCEE x2, LAD & OM1, 8/18/2010, JAYCEE to RCA , 1/15/2015    Hyperlipidemia, baseline     Gout    GERD (gastroesophageal reflux disease)    Hypercoagulable state, Prothrombin mutation    Colon polyps    Anxiety    Chronic back pain    Morbid obesity, today down BMI 42.7    Carotid disease, bilateral    Aortic aneurysm, thoracic, 4.3 cm, 8/2010    GARY (generalized anxiety disorder)    CKD (chronic kidney disease) stage 3, GFR 30-59 ml/min, 41    Abnormal cardiovascular stress test    LV dysfunction, EF 40%    Long term (current) use of anticoagulants    OA (osteoarthritis). post bilateral THR, 2001    Diabetic neuropathy    H/O bariatric surgery, 2008    Osteoarthritis, knee    BPH with obstruction/lower urinary tract symptoms    Proteinuria    Paroxysmal atrial fibrillation    Left ventricular dysfunction with reduced left ventricular function    Stasis dermatitis of both legs    Type 2 diabetes mellitus with diabetic nephropathy    Hypertensive left ventricular hypertrophy, without heart failure    Prostate cancer    Nocturia more than twice per night    Sleep deprivation    Chronic fatigue    Ulcer of toe of left foot    Peripheral vascular disease    Osteomyelitis of ankle or foot    Facet arthropathy    Anemia, chronic disease    Memory loss     Total face-to-face time with the patient was 30 minutes and greater than 50% was spent in counseling and coordination of care. The above assessment and plan have been discussed at length. Labs and procedure over the last 6 months reviewed. Problem List updated. Asked to bring in all active medications / pills bottles with next visit.

## 2018-07-26 NOTE — PROGRESS NOTES
Patient, Richard Bustos (MRN #[id number]), presented with a recent Ejection Fraction less than 45% consistent with the definition of cardiomyopathy (ICD10- I42.8).    EF   Date Value Ref Range Status   08/11/2017 35 (A) 55 - 65      The patient's cardiomyopathy was monitored, evaluated, addressed and/or treated. This addendum to the medical record is made on 07/26/2018.

## 2018-07-26 NOTE — PROGRESS NOTES
Per  patient in clinic today received Trelstar 22.5mg IM in right gluteal. Patient tolerated well no adverse reaction noted.

## 2018-07-27 ENCOUNTER — OFFICE VISIT (OUTPATIENT)
Dept: PODIATRY | Facility: CLINIC | Age: 73
End: 2018-07-27
Payer: MEDICARE

## 2018-07-27 VITALS — BODY MASS INDEX: 42.8 KG/M2 | WEIGHT: 289 LBS | HEIGHT: 69 IN

## 2018-07-27 DIAGNOSIS — I73.9 PERIPHERAL VASCULAR DISEASE: ICD-10-CM

## 2018-07-27 DIAGNOSIS — L97.512 ULCERATED, FOOT, RIGHT, WITH FAT LAYER EXPOSED: Primary | ICD-10-CM

## 2018-07-27 DIAGNOSIS — E11.42 DIABETIC POLYNEUROPATHY ASSOCIATED WITH TYPE 2 DIABETES MELLITUS: ICD-10-CM

## 2018-07-27 PROCEDURE — 99999 PR PBB SHADOW E&M-EST. PATIENT-LVL III: CPT | Mod: PBBFAC,,, | Performed by: PODIATRIST

## 2018-07-27 PROCEDURE — 3044F HG A1C LEVEL LT 7.0%: CPT | Mod: CPTII,S$GLB,, | Performed by: PODIATRIST

## 2018-07-27 PROCEDURE — 99212 OFFICE O/P EST SF 10 MIN: CPT | Mod: S$GLB,,, | Performed by: PODIATRIST

## 2018-07-27 NOTE — PROGRESS NOTES
Subjective:      Patient ID: Richard Bustos is a 72 y.o. male.    Chief Complaint: Foot Ulcer (right foot )    Richard is a 72 y.o. male who presents to the clinic for evaluation and treatment of high risk feet. Richard has a past medical history of Anemia; Anemia of other chronic disease (9/13/2017); Anemia, chronic renal failure (9/13/2017); Anemia, unspecified (9/13/2017); Anticoagulant long-term use; Aorta aneurysm; Arthritis; Atrial fibrillation; CAD (coronary artery disease); CHF (congestive heart failure); Chronic kidney disease; Clotting disorder (9/13/2017); Colon polyp; Diabetes mellitus; Diabetes mellitus type II; Diverticulosis; Elevated PSA; Encounter for blood transfusion; Former smoker; GERD (gastroesophageal reflux disease); Gout; colonic polyps; Hypercoagulable state; Hyperlipidemia; Hypertension; MI, old (2010); Myocardial infarction; Prostate cancer (06/2016); Sleep apnea; and Venous stasis. The patient's chief complaint is diabetic foot ulcer, plantar right 1st mtpj. Football CDI  Ambulating minimally in sx shoe right, DM shoes, inserts left..  xrays negative osteolysis, negative foreign body, negative gas.      PCP: Familia Ramirez Jr, MD    Date Last Seen by PCP:   Chief Complaint   Patient presents with    Foot Ulcer     right foot          Current shoe gear:  Affected Foot: Rx diabetic extra depth shoes and custom accommodative insoles     Unaffected Foot: Rx diabetic extra depth shoes and custom accommodative insoles    History of Trauma: negative  Sign of Infection: none    Hemoglobin A1C   Date Value Ref Range Status   07/19/2018 6.7 (H) 4.0 - 5.6 % Final     Comment:     ADA Screening Guidelines:  5.7-6.4%  Consistent with prediabetes  >or=6.5%  Consistent with diabetes  High levels of fetal hemoglobin interfere with the HbA1C  assay. Heterozygous hemoglobin variants (HbS, HgC, etc)do  not significantly interfere with this assay.   However, presence of multiple variants may affect accuracy.      05/12/2018 6.3 (H) 4.0 - 5.6 % Final     Comment:     According to ADA guidelines, hemoglobin A1c <7.0% represents  optimal control in non-pregnant diabetic patients. Different  metrics may apply to specific patient populations.   Standards of Medical Care in Diabetes-2016.  For the purpose of screening for the presence of diabetes:  <5.7%     Consistent with the absence of diabetes  5.7-6.4%  Consistent with increasing risk for diabetes   (prediabetes)  >or=6.5%  Consistent with diabetes  Currently, no consensus exists for use of hemoglobin A1c  for diagnosis of diabetes for children.  This Hemoglobin A1c assay has significant interference with fetal   hemoglobin   (HbF). The results are invalid for patients with abnormal amounts of   HbF,   including those with known Hereditary Persistence   of Fetal Hemoglobin. Heterozygous hemoglobin variants (HbAS, HbAC,   HbAD, HbAE, HbA2) do not significantly interfere with this assay;   however, presence of multiple variants in a sample may impact the %   interference.     09/19/2017 6.7 (H) 4.0 - 5.6 % Final     Comment:     According to ADA guidelines, hemoglobin A1c <7.0% represents  optimal control in non-pregnant diabetic patients. Different  metrics may apply to specific patient populations.   Standards of Medical Care in Diabetes-2016.  For the purpose of screening for the presence of diabetes:  <5.7%     Consistent with the absence of diabetes  5.7-6.4%  Consistent with increasing risk for diabetes   (prediabetes)  >or=6.5%  Consistent with diabetes  Currently, no consensus exists for use of hemoglobin A1c  for diagnosis of diabetes for children.  This Hemoglobin A1c assay has significant interference with fetal   hemoglobin   (HbF). The results are invalid for patients with abnormal amounts of   HbF,   including those with known Hereditary Persistence   of Fetal Hemoglobin. Heterozygous hemoglobin variants (HbAS, HbAC,   HbAD, HbAE, HbA2) do not significantly  interfere with this assay;   however, presence of multiple variants in a sample may impact the %   interference.     06/17/2013 6.8 (H) 4.8 - 5.6 % Final     Comment:              Increased risk for diabetes: 5.7 - 6.4           Diabetes: >6.4           Glycemic control for adults with diabetes: <7.0  **In order to standardize %HbA1c results worldwide, as of October 11, 2010,  the %HbA1c is being calculated using the master equation recommended in the  consensus statement adopted by the ADA (American Diabetes Assoc), EASD  (European Assoc for the Study of Diabetes), IFCC (International Federation  of Clinical Chemistry and Laboratory Medicine) and IDF (International  Diabetes Federation). Result units: %HgbA1c (DCCT/NGSP).  In common with other methods, Hb A1C values may not accurately reflect mean  blood glucose in patients with hemoglobin variants (HgbF, HgbS and HgbC).  Any cause of shortened erythrocyte survival will reduce exposure of  erythrocytes to glucose with a consequent decrease in HbA1c (%) values, even  though the time-averaged blood glucose level may be elevated. Causes of  shortened erythrocyte lifetime might be hemolytic anemia or other hemolytic  diseases, homozygous sickle cell trait, pregnancy, recent significant or  chronic blood loss, etc. Caution should be used when interpreting the HbA1c  results from patients with these conditions.       Review of Systems   Constitution: Negative for chills, fever, malaise/fatigue and night sweats.   Cardiovascular: Positive for leg swelling. Negative for claudication and cyanosis.   Skin: Positive for poor wound healing. Negative for color change, itching, rash and suspicious lesions.   Musculoskeletal: Negative for falls, gout, joint pain and myalgias.   Neurological: Positive for numbness and sensory change.           Objective:      Physical Exam   Constitutional: He is oriented to person, place, and time. He appears well-developed and well-nourished. He  is cooperative. No distress.   Cardiovascular:   Pulses:       Popliteal pulses are 2+ on the right side, and 2+ on the left side.        Dorsalis pedis pulses are 1+ on the right side, and 1+ on the left side.        Posterior tibial pulses are 1+ on the right side, and 1+ on the left side.   Capillary refill 3 seconds all toes/distal feet, all toes/both feet warm to touch.      Negative lymphadenopathy bilateral popliteal fossa and tarsal tunnel.      Negavie lower extremity edema bilateral.     Musculoskeletal:        Right ankle: He exhibits normal range of motion, no swelling, no ecchymosis, no deformity, no laceration and normal pulse. Achilles tendon normal. Achilles tendon exhibits no pain, no defect and normal Chung's test results.   Lymphadenopathy: No inguinal adenopathy noted on the right or left side.   Negative lymphadenopathy bilateral popliteal fossa and tarsal tunnel.    Negative lymphangitic streaking bilateral feet/ankles/legs.   Neurological: He is alert and oriented to person, place, and time. He has normal strength. He displays no atrophy and no tremor. A sensory deficit is present. He exhibits normal muscle tone. Gait normal.   Reflex Scores:       Patellar reflexes are 2+ on the right side and 2+ on the left side.       Achilles reflexes are 2+ on the right side and 2+ on the left side.  Paresthesias, and burning bilateral feet with no clearly identified trigger or source.    Diminished/loss of protective sensation all toes bilateral to 10 gram monofilament.     Skin: Skin is warm, dry and intact. Capillary refill takes 2 to 3 seconds. No abrasion, no bruising, no burn, no ecchymosis, no laceration, no lesion and no rash noted. He is not diaphoretic. No cyanosis or erythema. No pallor. Nails show no clubbing.     Wound:  Plantar right 1st mtp    Size:    Pre:3v0g3mo    Base:  Granular, pink with moderate serous/serosanaguinous drainage only.  No pus, tracking, fluctuance, malodor, or  cardinal signs infection.    Borders:  Hyperkeratotic, debriding to flat, pink, blanchable skin edges without undermining.    Otherwise, Skin thin, shiny, atrophic, with decreased density and distribution of pedal hair bilateral, but without  minerva discoloration,  ulcers, masses, nodules or cords palpated bilateral feet and legs.    Hyperpigmentation both legs consistent with stasis.   Psychiatric: He has a normal mood and affect.             Assessment:       Encounter Diagnoses   Name Primary?    Ulcerated, foot, right, with fat layer exposed Yes    Diabetic polyneuropathy associated with type 2 diabetes mellitus     Peripheral vascular disease          Plan:       Richard was seen today for foot ulcer.    Diagnoses and all orders for this visit:    Ulcerated, foot, right, with fat layer exposed    Diabetic polyneuropathy associated with type 2 diabetes mellitus    Peripheral vascular disease      I counseled the patient on his conditions, their implications and medical management.    Dressed with aperture,davy foam, football right.  Ambulate minimally in sx shoe right, DM shoes/inserts left.    Have right insert modified to better offload 1st mtp.    Adequate vitamin supplementation, protein intake, and hydration - discussed with patient.                Follow-up in about 1 week (around 8/3/2018).

## 2018-08-03 ENCOUNTER — OFFICE VISIT (OUTPATIENT)
Dept: PODIATRY | Facility: CLINIC | Age: 73
End: 2018-08-03
Payer: MEDICARE

## 2018-08-03 VITALS — WEIGHT: 289 LBS | HEIGHT: 69 IN | BODY MASS INDEX: 42.8 KG/M2

## 2018-08-03 DIAGNOSIS — L97.512 ULCERATED, FOOT, RIGHT, WITH FAT LAYER EXPOSED: Primary | ICD-10-CM

## 2018-08-03 DIAGNOSIS — E11.42 DIABETIC POLYNEUROPATHY ASSOCIATED WITH TYPE 2 DIABETES MELLITUS: ICD-10-CM

## 2018-08-03 DIAGNOSIS — F41.9 ANXIETY: ICD-10-CM

## 2018-08-03 DIAGNOSIS — I73.9 PERIPHERAL VASCULAR DISEASE: ICD-10-CM

## 2018-08-03 DIAGNOSIS — G89.29 CHRONIC LOW BACK PAIN, UNSPECIFIED BACK PAIN LATERALITY, WITH SCIATICA PRESENCE UNSPECIFIED: ICD-10-CM

## 2018-08-03 DIAGNOSIS — M54.5 CHRONIC LOW BACK PAIN, UNSPECIFIED BACK PAIN LATERALITY, WITH SCIATICA PRESENCE UNSPECIFIED: ICD-10-CM

## 2018-08-03 PROCEDURE — 11042 DBRDMT SUBQ TIS 1ST 20SQCM/<: CPT | Mod: S$GLB,,, | Performed by: PODIATRIST

## 2018-08-03 PROCEDURE — 99499 UNLISTED E&M SERVICE: CPT | Mod: S$GLB,,, | Performed by: PODIATRIST

## 2018-08-03 PROCEDURE — 99999 PR PBB SHADOW E&M-EST. PATIENT-LVL V: CPT | Mod: PBBFAC,,, | Performed by: PODIATRIST

## 2018-08-03 RX ORDER — ALPRAZOLAM 1 MG/1
TABLET ORAL
Qty: 30 TABLET | Refills: 0 | OUTPATIENT
Start: 2018-08-03

## 2018-08-03 RX ORDER — HYDROCODONE BITARTRATE AND ACETAMINOPHEN 7.5; 325 MG/1; MG/1
1 TABLET ORAL EVERY 6 HOURS PRN
Qty: 90 TABLET | Refills: 0 | OUTPATIENT
Start: 2018-08-03

## 2018-08-03 NOTE — TELEPHONE ENCOUNTER
Pt is requesting medication refill on Xanax 1 mg  Last refill: 7/11/18  Last visit: 5/12/18  Follow Up: 8/13/18    Pt is requesting medication refill on Norco 7.5-325 mg  Last refill: 7/11/18  Last visit: 5/12/18  Follow Up: 8/13/18        ----- Message from Monika Santos sent at 8/3/2018  9:01 AM CDT -----  Contact: SELF   Patient came in to ask for a refill of his 2 medications HYDROcodone-acetaminophen (NORCO) and ALPRAZolam (XANAX) 1 MG. Please call the patient at [phone]. [facility] med[facility] [address] [facility] [address]

## 2018-08-03 NOTE — PROCEDURES
"Wound Debridement  Date/Time: 8/3/2018 8:54 AM  Performed by: ALBINO LARSON  Authorized by: ALBINO LARSON     Time out: Immediately prior to procedure a "time out" was called to verify the correct patient, procedure, equipment, support staff and site/side marked as required.    Consent Done?:  Yes (Verbal)  Local anesthesia used?: No      Wound Details:    Location:  Right foot    Location:  Right 1st Metatarsal Head    Type of Debridement:  Excisional       Length (cm):  0.7       Area (sq cm):  0.28       Width (cm):  0.4       Percent Debrided (%):  100       Depth (cm):  0.2       Total Area Debrided (sq cm):  0.28    Depth of debridement:  Subcutaneous tissue    Tissue debrided:  Dermis, Epidermis and Subcutaneous    Devitalized tissue debrided:  Biofilm and Callus    Instruments:  Blade    Bleeding:  Minimal  Hemostasis Achieved: Yes    Method Used:  Pressure  Patient tolerance:  Patient tolerated the procedure well with no immediate complications      "

## 2018-08-06 ENCOUNTER — TELEPHONE (OUTPATIENT)
Dept: FAMILY MEDICINE | Facility: CLINIC | Age: 73
End: 2018-08-06

## 2018-08-06 NOTE — TELEPHONE ENCOUNTER
Called pt regarding below message. Left voicemail with return number.     ----- Message from Madelin May sent at 8/6/2018 10:14 AM CDT -----  Contact: daughter Citlali Frye  Patient need refills on following medication   ALPRAZolam (XANAX) 1 MG tablet   HYDROcodone-acetaminophen (NORCO) 7.5-325 mg per tablet      Please call to advice [phone] (home)         [facility] [address] [place] [address] [address] AT [facility] [place] [address]  Phone: [phone] Fax: [phone]

## 2018-08-07 DIAGNOSIS — M54.5 CHRONIC LOW BACK PAIN, UNSPECIFIED BACK PAIN LATERALITY, WITH SCIATICA PRESENCE UNSPECIFIED: ICD-10-CM

## 2018-08-07 DIAGNOSIS — G89.29 CHRONIC LOW BACK PAIN, UNSPECIFIED BACK PAIN LATERALITY, WITH SCIATICA PRESENCE UNSPECIFIED: ICD-10-CM

## 2018-08-07 DIAGNOSIS — F41.9 ANXIETY: ICD-10-CM

## 2018-08-07 RX ORDER — HYDROCODONE BITARTRATE AND ACETAMINOPHEN 7.5; 325 MG/1; MG/1
1 TABLET ORAL EVERY 6 HOURS PRN
Qty: 90 TABLET | Refills: 0 | OUTPATIENT
Start: 2018-08-07

## 2018-08-07 RX ORDER — ALPRAZOLAM 1 MG/1
TABLET ORAL
Qty: 30 TABLET | Refills: 0 | OUTPATIENT
Start: 2018-08-07

## 2018-08-07 NOTE — TELEPHONE ENCOUNTER
Pt is requesting medication refill on Xanax 1 mg  Last refill: 7/11/18  Last visit: 5/12/18  Follow Up: 8/13/18     Pt is requesting medication refill on Norco 7.5-325 mg  Last refill: 7/11/18  Last visit: 5/12/18  Follow Up: 8/13/18      ----- Message from Domenica Underwood sent at 8/7/2018  2:52 PM CDT -----  Contact: WE[facility] [[id number]]  Can the clinic reply in [facility]: No      Please refill the medication(s) listed below. The patient can be reached at this phone number [phone]  once it is called into the pharmacy.      Medication #1 HYDROcodone-acetaminophen (NORCO) 7.5-325 mg per tablet 90 tablet     Medication #2 ALPRAZolam (XANAX) 1 MG tablet 30 tablet       Preferred Pharmacy:  On File

## 2018-08-09 ENCOUNTER — HOSPITAL ENCOUNTER (OUTPATIENT)
Dept: CARDIOLOGY | Facility: HOSPITAL | Age: 73
Discharge: HOME | End: 2018-08-09
Attending: INTERNAL MEDICINE
Payer: MEDICARE

## 2018-08-09 VITALS — BODY MASS INDEX: 42.8 KG/M2 | HEIGHT: 69 IN | WEIGHT: 289 LBS | HEART RATE: 60 BPM

## 2018-08-09 DIAGNOSIS — I15.2 HYPERTENSION ASSOCIATED WITH DIABETES: ICD-10-CM

## 2018-08-09 DIAGNOSIS — I51.9 LV DYSFUNCTION: ICD-10-CM

## 2018-08-09 DIAGNOSIS — I71.20 THORACIC AORTIC ANEURYSM WITHOUT RUPTURE: Chronic | ICD-10-CM

## 2018-08-09 DIAGNOSIS — E11.59 HYPERTENSION ASSOCIATED WITH DIABETES: ICD-10-CM

## 2018-08-09 DIAGNOSIS — I48.0 PAROXYSMAL ATRIAL FIBRILLATION: ICD-10-CM

## 2018-08-09 LAB
AORTIC ROOT ANNULUS: 4.25 CM
AORTIC VALVE CUSP SEPERATION: 1.96 CM
AV MEAN GRADIENT: 4.76 MMHG
AV PEAK GRADIENT: 10.18 MMHG
AV VALVE AREA: 3.7 CM2
BSA FOR ECHO PROCEDURE: 2.53 M2
CV ECHO LV RWT: 0.42 CM
DOP CALC AO PEAK VEL: 1.59 M/S
DOP CALC AO VTI: 28.07 CM
DOP CALC LVOT AREA: 3.73 CM2
DOP CALC LVOT DIAMETER: 2.18 CM
DOP CALC LVOT STROKE VOLUME: 103.75 CM3
DOP CALCLVOT PEAK VEL VTI: 27.81 CM
E WAVE DECELERATION TIME: 182.56 MSEC
E/A RATIO: 1.34
E/E' RATIO: 9
ECHO LV POSTERIOR WALL: 0.99 CM (ref 0.6–1.1)
FRACTIONAL SHORTENING: 26 % (ref 28–44)
INTERVENTRICULAR SEPTUM: 1.07 CM (ref 0.6–1.1)
IVRT: 0.12 MSEC
LEFT ATRIUM SIZE: 4.92 CM
LEFT INTERNAL DIMENSION IN SYSTOLE: 3.65 CM (ref 2.1–4)
LEFT VENTRICLE MASS INDEX: 73.6 G/M2
LEFT VENTRICULAR INTERNAL DIMENSION IN DIASTOLE: 4.95 CM (ref 3.5–6)
LEFT VENTRICULAR MASS: 186.29 G
LV LATERAL E/E' RATIO: 6.43
LV SEPTAL E/E' RATIO: 15
MV PEAK A VEL: 0.67 M/S
MV PEAK E VEL: 0.9 M/S
MV STENOSIS PRESSURE HALF TIME: 52.94 MS
MV VALVE AREA P 1/2 METHOD: 4.16 CM2
PISA TR MAX VEL: 2.88 M/S
PULM VEIN A" WAVE DURATION": 90 MSEC
PV PEAK GRADIENT: 4.86 MMHG
PV PEAK VELOCITY: 1.1 CM/S
RIGHT VENTRICULAR END-DIASTOLIC DIMENSION: 3.97 CM
TDI LATERAL: 0.14
TDI SEPTAL: 0.06
TDI: 0.1
TR MAX PG: 33.18 MMHG
TRICUSPID ANNULAR PLANE SYSTOLIC EXCURSION: 0.04 CM

## 2018-08-09 PROCEDURE — 93306 TTE W/DOPPLER COMPLETE: CPT

## 2018-08-09 PROCEDURE — 25500020 PHARM REV CODE 255: Performed by: INTERNAL MEDICINE

## 2018-08-09 PROCEDURE — 93306 TTE W/DOPPLER COMPLETE: CPT | Mod: 26,,, | Performed by: INTERNAL MEDICINE

## 2018-08-09 RX ORDER — ALPRAZOLAM 1 MG/1
TABLET ORAL
Qty: 30 TABLET | Refills: 0 | Status: SHIPPED | OUTPATIENT
Start: 2018-08-09 | End: 2018-09-11 | Stop reason: SDUPTHER

## 2018-08-09 RX ORDER — HYDROCODONE BITARTRATE AND ACETAMINOPHEN 7.5; 325 MG/1; MG/1
1 TABLET ORAL EVERY 6 HOURS PRN
Qty: 90 TABLET | Refills: 0 | Status: SHIPPED | OUTPATIENT
Start: 2018-08-09 | End: 2018-09-11 | Stop reason: SDUPTHER

## 2018-08-09 RX ADMIN — SULFUR HEXAFLUORIDE: KIT at 01:08

## 2018-08-09 NOTE — TELEPHONE ENCOUNTER
Called pharmacy regarding below message. Informed pharmacy of error per Dr. Ramirez. Pharmacy will not fill before written fill date. Pt verbalized understanding with no further questions.

## 2018-08-09 NOTE — TELEPHONE ENCOUNTER
reviewed.  His last hydrocodone was sent to the pharmacy by me with a date to be refilled on 07/11/2018.  This was sent to the pharmacy on 07/09/2018 with the intent that would not be filled until 07/11/2018.  The pharmacy disregarded the refill date and refilled it on the date that was sent, 07/09/2018.  The patient's hydrocodone will be refilled today but with a note to the pharmacy that they are not to refill it except on the date that I specify in the prescription.  This is usually a full 30 days after the last prescription.  If this occurs again, I will refuse to send it to that particular pharmacy.

## 2018-08-10 ENCOUNTER — DOCUMENTATION ONLY (OUTPATIENT)
Dept: FAMILY MEDICINE | Facility: CLINIC | Age: 73
End: 2018-08-10

## 2018-08-10 ENCOUNTER — OFFICE VISIT (OUTPATIENT)
Dept: PODIATRY | Facility: CLINIC | Age: 73
End: 2018-08-10
Payer: MEDICARE

## 2018-08-10 VITALS
BODY MASS INDEX: 42.8 KG/M2 | HEIGHT: 69 IN | SYSTOLIC BLOOD PRESSURE: 117 MMHG | WEIGHT: 289 LBS | DIASTOLIC BLOOD PRESSURE: 68 MMHG | HEART RATE: 59 BPM

## 2018-08-10 DIAGNOSIS — E11.42 DIABETIC POLYNEUROPATHY ASSOCIATED WITH TYPE 2 DIABETES MELLITUS: ICD-10-CM

## 2018-08-10 DIAGNOSIS — I73.9 PERIPHERAL VASCULAR DISEASE: ICD-10-CM

## 2018-08-10 DIAGNOSIS — M20.42 HAMMER TOES OF BOTH FEET: ICD-10-CM

## 2018-08-10 DIAGNOSIS — M20.41 HAMMER TOES OF BOTH FEET: ICD-10-CM

## 2018-08-10 DIAGNOSIS — L97.512 ULCERATED, FOOT, RIGHT, WITH FAT LAYER EXPOSED: Primary | ICD-10-CM

## 2018-08-10 PROCEDURE — 3078F DIAST BP <80 MM HG: CPT | Mod: CPTII,S$GLB,, | Performed by: PODIATRIST

## 2018-08-10 PROCEDURE — 99999 PR PBB SHADOW E&M-EST. PATIENT-LVL III: CPT | Mod: PBBFAC,,, | Performed by: PODIATRIST

## 2018-08-10 PROCEDURE — 3044F HG A1C LEVEL LT 7.0%: CPT | Mod: CPTII,S$GLB,, | Performed by: PODIATRIST

## 2018-08-10 PROCEDURE — 3074F SYST BP LT 130 MM HG: CPT | Mod: CPTII,S$GLB,, | Performed by: PODIATRIST

## 2018-08-10 PROCEDURE — 99212 OFFICE O/P EST SF 10 MIN: CPT | Mod: S$GLB,,, | Performed by: PODIATRIST

## 2018-08-10 NOTE — PROGRESS NOTES
Pre-Visit Chart Review  For Appointment Scheduled on 08/13/2018    Health Maintenance Due   Topic Date Due    Eye Exam  09/04/1955    Influenza Vaccine  08/01/2018

## 2018-08-10 NOTE — PROGRESS NOTES
Subjective:      Patient ID: Richard Bustos is a 72 y.o. male.    Chief Complaint: Foot Ulcer (right foot)    Richard is a 72 y.o. male who presents to the clinic for evaluation and treatment of high risk feet. Richard has a past medical history of Anemia; Anemia of other chronic disease (9/13/2017); Anemia, chronic renal failure (9/13/2017); Anemia, unspecified (9/13/2017); Anticoagulant long-term use; Aorta aneurysm; Arthritis; Atrial fibrillation; CAD (coronary artery disease); CHF (congestive heart failure); Chronic kidney disease; Clotting disorder (9/13/2017); Colon polyp; Diabetes mellitus; Diabetes mellitus type II; Diverticulosis; Elevated PSA; Encounter for blood transfusion; Former smoker; GERD (gastroesophageal reflux disease); Gout; colonic polyps; Hypercoagulable state; Hyperlipidemia; Hypertension; MI, old (2010); Myocardial infarction; Prostate cancer (06/2016); Sleep apnea; and Venous stasis. The patient's chief complaint is diabetic foot ulcer, plantar right 1st mtpj. Football CDI  Ambulating more this week in sx shoe right, DM shoes, inserts left..  xrays negative osteolysis, negative foreign body, negative gas.      PCP: Familia Ramirez Jr, MD    Date Last Seen by PCP:   Chief Complaint   Patient presents with    Foot Ulcer     right foot         Current shoe gear:  Affected Foot: Rx diabetic extra depth shoes and custom accommodative insoles     Unaffected Foot: Rx diabetic extra depth shoes and custom accommodative insoles    History of Trauma: negative  Sign of Infection: none    Hemoglobin A1C   Date Value Ref Range Status   07/19/2018 6.7 (H) 4.0 - 5.6 % Final     Comment:     ADA Screening Guidelines:  5.7-6.4%  Consistent with prediabetes  >or=6.5%  Consistent with diabetes  High levels of fetal hemoglobin interfere with the HbA1C  assay. Heterozygous hemoglobin variants (HbS, HgC, etc)do  not significantly interfere with this assay.   However, presence of multiple variants may affect  accuracy.     05/12/2018 6.3 (H) 4.0 - 5.6 % Final     Comment:     According to ADA guidelines, hemoglobin A1c <7.0% represents  optimal control in non-pregnant diabetic patients. Different  metrics may apply to specific patient populations.   Standards of Medical Care in Diabetes-2016.  For the purpose of screening for the presence of diabetes:  <5.7%     Consistent with the absence of diabetes  5.7-6.4%  Consistent with increasing risk for diabetes   (prediabetes)  >or=6.5%  Consistent with diabetes  Currently, no consensus exists for use of hemoglobin A1c  for diagnosis of diabetes for children.  This Hemoglobin A1c assay has significant interference with fetal   hemoglobin   (HbF). The results are invalid for patients with abnormal amounts of   HbF,   including those with known Hereditary Persistence   of Fetal Hemoglobin. Heterozygous hemoglobin variants (HbAS, HbAC,   HbAD, HbAE, HbA2) do not significantly interfere with this assay;   however, presence of multiple variants in a sample may impact the %   interference.     09/19/2017 6.7 (H) 4.0 - 5.6 % Final     Comment:     According to ADA guidelines, hemoglobin A1c <7.0% represents  optimal control in non-pregnant diabetic patients. Different  metrics may apply to specific patient populations.   Standards of Medical Care in Diabetes-2016.  For the purpose of screening for the presence of diabetes:  <5.7%     Consistent with the absence of diabetes  5.7-6.4%  Consistent with increasing risk for diabetes   (prediabetes)  >or=6.5%  Consistent with diabetes  Currently, no consensus exists for use of hemoglobin A1c  for diagnosis of diabetes for children.  This Hemoglobin A1c assay has significant interference with fetal   hemoglobin   (HbF). The results are invalid for patients with abnormal amounts of   HbF,   including those with known Hereditary Persistence   of Fetal Hemoglobin. Heterozygous hemoglobin variants (HbAS, HbAC,   HbAD, HbAE, HbA2) do not  significantly interfere with this assay;   however, presence of multiple variants in a sample may impact the %   interference.     06/17/2013 6.8 (H) 4.8 - 5.6 % Final     Comment:              Increased risk for diabetes: 5.7 - 6.4           Diabetes: >6.4           Glycemic control for adults with diabetes: <7.0  **In order to standardize %HbA1c results worldwide, as of October 11, 2010,  the %HbA1c is being calculated using the master equation recommended in the  consensus statement adopted by the ADA (American Diabetes Assoc), EASD  (European Assoc for the Study of Diabetes), IFCC (International Federation  of Clinical Chemistry and Laboratory Medicine) and IDF (International  Diabetes Federation). Result units: %HgbA1c (DCCT/NGSP).  In common with other methods, Hb A1C values may not accurately reflect mean  blood glucose in patients with hemoglobin variants (HgbF, HgbS and HgbC).  Any cause of shortened erythrocyte survival will reduce exposure of  erythrocytes to glucose with a consequent decrease in HbA1c (%) values, even  though the time-averaged blood glucose level may be elevated. Causes of  shortened erythrocyte lifetime might be hemolytic anemia or other hemolytic  diseases, homozygous sickle cell trait, pregnancy, recent significant or  chronic blood loss, etc. Caution should be used when interpreting the HbA1c  results from patients with these conditions.       Review of Systems   Constitution: Negative for chills, fever, malaise/fatigue and night sweats.   Cardiovascular: Positive for leg swelling. Negative for claudication and cyanosis.   Skin: Positive for poor wound healing. Negative for color change, itching, rash and suspicious lesions.   Musculoskeletal: Negative for falls, gout, joint pain and myalgias.   Neurological: Positive for numbness and sensory change.           Objective:      Physical Exam   Constitutional: He is oriented to person, place, and time. He appears well-developed and  well-nourished. He is cooperative. No distress.   Cardiovascular:   Pulses:       Popliteal pulses are 2+ on the right side, and 2+ on the left side.        Dorsalis pedis pulses are 1+ on the right side, and 1+ on the left side.        Posterior tibial pulses are 1+ on the right side, and 1+ on the left side.   Capillary refill 3 seconds all toes/distal feet, all toes/both feet warm to touch.      Negative lymphadenopathy bilateral popliteal fossa and tarsal tunnel.      Negavie lower extremity edema bilateral.     Musculoskeletal:        Right ankle: He exhibits normal range of motion, no swelling, no ecchymosis, no deformity, no laceration and normal pulse. Achilles tendon normal. Achilles tendon exhibits no pain, no defect and normal Chung's test results.   Patient has hammertoes of digits  2-5 bilateral           reducible without symptom today.     Lymphadenopathy: No inguinal adenopathy noted on the right or left side.   Negative lymphadenopathy bilateral popliteal fossa and tarsal tunnel.    Negative lymphangitic streaking bilateral feet/ankles/legs.   Neurological: He is alert and oriented to person, place, and time. He has normal strength. He displays no atrophy and no tremor. A sensory deficit is present. He exhibits normal muscle tone. Gait normal.   Reflex Scores:       Patellar reflexes are 2+ on the right side and 2+ on the left side.       Achilles reflexes are 2+ on the right side and 2+ on the left side.  Paresthesias, and burning bilateral feet with no clearly identified trigger or source.    Diminished/loss of protective sensation all toes bilateral to 10 gram monofilament.     Skin: Skin is warm, dry and intact. Capillary refill takes 2 to 3 seconds. No abrasion, no bruising, no burn, no ecchymosis, no laceration, no lesion and no rash noted. He is not diaphoretic. No cyanosis or erythema. No pallor. Nails show no clubbing.     Wound plantar right 1st mtpj closed today, epithelialized   without ulceration, drainage, pus, tracking, fluctuance, malodor, or cardinal signs infection.      Otherwise, Skin thin, shiny, atrophic, with decreased density and distribution of pedal hair bilateral, but without  minerva discoloration,  ulcers, masses, nodules or cords palpated bilateral feet and legs.    Hyperpigmentation both legs consistent with stasis.   Psychiatric: He has a normal mood and affect.             Assessment:       Encounter Diagnoses   Name Primary?    Ulcerated, foot, right, with fat layer exposed Yes    Diabetic polyneuropathy associated with type 2 diabetes mellitus     Peripheral vascular disease          Plan:       Richard was seen today for foot ulcer.    Diagnoses and all orders for this visit:    Ulcerated, foot, right, with fat layer exposed    Diabetic polyneuropathy associated with type 2 diabetes mellitus    Peripheral vascular disease      I counseled the patient on his conditions, their implications and medical management.    Dressed with double aperture,davy foam, football right.  Ambulate minimally in sx shoe right, DM shoes/inserts left.    Have right insert modified to better offload 1st mtp - Rx written.    Adequate vitamin supplementation, protein intake, and hydration - discussed with patient.    Rx new DM Shoes, custom inserts.              Follow-up in about 2 weeks (around 8/24/2018).

## 2018-08-13 ENCOUNTER — OFFICE VISIT (OUTPATIENT)
Dept: FAMILY MEDICINE | Facility: CLINIC | Age: 73
End: 2018-08-13
Payer: MEDICARE

## 2018-08-13 ENCOUNTER — LAB VISIT (OUTPATIENT)
Dept: LAB | Facility: HOSPITAL | Age: 73
End: 2018-08-13
Attending: PHYSICIAN ASSISTANT
Payer: MEDICARE

## 2018-08-13 VITALS
DIASTOLIC BLOOD PRESSURE: 72 MMHG | HEART RATE: 51 BPM | WEIGHT: 299.38 LBS | HEIGHT: 69 IN | BODY MASS INDEX: 44.34 KG/M2 | TEMPERATURE: 98 F | SYSTOLIC BLOOD PRESSURE: 128 MMHG

## 2018-08-13 DIAGNOSIS — I15.2 HYPERTENSION ASSOCIATED WITH DIABETES: ICD-10-CM

## 2018-08-13 DIAGNOSIS — E11.22 TYPE 2 DIABETES MELLITUS WITH STAGE 3 CHRONIC KIDNEY DISEASE, WITH LONG-TERM CURRENT USE OF INSULIN: ICD-10-CM

## 2018-08-13 DIAGNOSIS — Z79.4 TYPE 2 DIABETES MELLITUS WITH STAGE 3 CHRONIC KIDNEY DISEASE, WITH LONG-TERM CURRENT USE OF INSULIN: ICD-10-CM

## 2018-08-13 DIAGNOSIS — M47.819 FACET ARTHROPATHY: ICD-10-CM

## 2018-08-13 DIAGNOSIS — E11.59 HYPERTENSION ASSOCIATED WITH DIABETES: ICD-10-CM

## 2018-08-13 DIAGNOSIS — M54.5 CHRONIC LOW BACK PAIN, UNSPECIFIED BACK PAIN LATERALITY, WITH SCIATICA PRESENCE UNSPECIFIED: ICD-10-CM

## 2018-08-13 DIAGNOSIS — F11.90 CHRONIC, CONTINUOUS USE OF OPIOIDS: Primary | ICD-10-CM

## 2018-08-13 DIAGNOSIS — Z79.899 CHRONICALLY ON BENZODIAZEPINE THERAPY: ICD-10-CM

## 2018-08-13 DIAGNOSIS — N18.30 TYPE 2 DIABETES MELLITUS WITH STAGE 3 CHRONIC KIDNEY DISEASE, WITH LONG-TERM CURRENT USE OF INSULIN: ICD-10-CM

## 2018-08-13 DIAGNOSIS — G89.29 CHRONIC LOW BACK PAIN, UNSPECIFIED BACK PAIN LATERALITY, WITH SCIATICA PRESENCE UNSPECIFIED: ICD-10-CM

## 2018-08-13 PROBLEM — M86.9 OSTEOMYELITIS OF ANKLE OR FOOT: Status: RESOLVED | Noted: 2017-03-09 | Resolved: 2018-08-13

## 2018-08-13 LAB
CHOLEST SERPL-MCNC: 113 MG/DL
CHOLEST/HDLC SERPL: 2.7 {RATIO}
HDLC SERPL-MCNC: 42 MG/DL
HDLC SERPL: 37.2 %
LDLC SERPL CALC-MCNC: 50.8 MG/DL
NONHDLC SERPL-MCNC: 71 MG/DL
TRIGL SERPL-MCNC: 101 MG/DL

## 2018-08-13 PROCEDURE — 99499 UNLISTED E&M SERVICE: CPT | Mod: S$GLB,,, | Performed by: PHYSICIAN ASSISTANT

## 2018-08-13 PROCEDURE — 80061 LIPID PANEL: CPT

## 2018-08-13 PROCEDURE — 99999 PR PBB SHADOW E&M-EST. PATIENT-LVL V: CPT | Mod: PBBFAC,,, | Performed by: PHYSICIAN ASSISTANT

## 2018-08-13 PROCEDURE — 99213 OFFICE O/P EST LOW 20 MIN: CPT | Mod: S$GLB,,, | Performed by: PHYSICIAN ASSISTANT

## 2018-08-13 PROCEDURE — 3078F DIAST BP <80 MM HG: CPT | Mod: CPTII,S$GLB,, | Performed by: PHYSICIAN ASSISTANT

## 2018-08-13 PROCEDURE — 3044F HG A1C LEVEL LT 7.0%: CPT | Mod: CPTII,S$GLB,, | Performed by: PHYSICIAN ASSISTANT

## 2018-08-13 PROCEDURE — 3074F SYST BP LT 130 MM HG: CPT | Mod: CPTII,S$GLB,, | Performed by: PHYSICIAN ASSISTANT

## 2018-08-13 PROCEDURE — 36415 COLL VENOUS BLD VENIPUNCTURE: CPT | Mod: PO

## 2018-08-13 NOTE — PROGRESS NOTES
Subjective:       Patient ID: Richard Bustos is a 72 y.o. male.    Chief Complaint: Diabetes    Patient with controlled type 2 diabetes mellitus, chronic kidney disease stage 3, polyneuropathy, chronic back pain, chronic continuous opioid dependence, chronic continuous benzodiazepine use, and anxiety presents for routine follow-up.  All of his chronic medical conditions are currently stable and he has no acute complaints today.  Patients patient medical/surgical, social and family histories have been reviewed         Review of Systems   Constitutional: Negative for activity change, appetite change, fatigue and unexpected weight change.   Respiratory: Negative for cough, chest tightness and shortness of breath.    Cardiovascular: Negative for chest pain, palpitations and leg swelling.   Endocrine: Negative for polydipsia and polyuria.   Musculoskeletal: Positive for arthralgias, back pain and gait problem.   Skin: Negative for rash.   Neurological: Negative for dizziness and headaches.   Psychiatric/Behavioral: Positive for sleep disturbance. Negative for dysphoric mood. The patient is nervous/anxious.        Objective:      Physical Exam   Constitutional: He is cooperative. He does not appear ill. No distress.   Morbidly obese body habitus   HENT:   Head: Normocephalic and atraumatic.   Neck: Trachea normal. Carotid bruit is not present. No thyroid mass and no thyromegaly present.   Cardiovascular: An irregularly irregular rhythm present. Bradycardia present.   No murmur heard.  Neurological: He is alert.   Vitals reviewed.      Assessment:       1. Chronic, continuous use of opioids    2. Chronically on benzodiazepine therapy    3. Facet arthropathy    4. Chronic low back pain, unspecified back pain laterality, with sciatica presence unspecified    5. Type 2 diabetes mellitus with stage 3 chronic kidney disease, with long-term current use of insulin    6. Hypertension associated with diabetes    7. BMI 40.0-44.9,  adult        Plan:       Richard was seen today for diabetes.    Diagnoses and all orders for this visit:    Chronic, continuous use of opioids  Refill has already been sent in per PCP   has been reviewed and without evidence of diversion.  Urine toxicology screen is up-to-date  Pain contract is up-to-date  Chronically on benzodiazepine therapy  Refill has already been sent in per PCP    Facet arthropathy  Continue pain management per PCP  Chronic low back pain, unspecified back pain laterality, with sciatica presence unspecified  Continue pain management per PCP  Type 2 diabetes mellitus with stage 3 chronic kidney disease, with long-term current use of insulin  -     Lipid panel; Future  Patient is up-to-date with eye exam.  He states he was seen at an eye clinic on [facility]  Hypertension associated with diabetes  -     Lipid panel; Future    BMI 40.0-44.9, adult    Patient readiness: acceptance and barriers:readiness    During the course of the visit the patient was educated and counseled about the following:     Diabetes:  Reminded to bring in blood sugar diary at next visit.  Hypertension:   Medication: no change.  Obesity:   Diet interventions: moderate (500 kCal/d) deficit diet.    Goals: Diabetes: Maintain Hemoglobin A1C below 7, Hypertension: Reduce Blood Pressure and Obesity: Reduce calorie intake and BMI    Did patient meet goals/outcomes: No    The following self management tools provided: declined    Patient Instructions (the written plan) was given to the patient/family.     Time spent with patient: 15 minutes    Barriers to medications present (no )    Adverse reactions to current medications (no)    Over the counter medications reviewed (Yes)

## 2018-08-20 ENCOUNTER — ANTI-COAG VISIT (OUTPATIENT)
Dept: CARDIOLOGY | Facility: CLINIC | Age: 73
End: 2018-08-20

## 2018-08-20 ENCOUNTER — HOSPITAL ENCOUNTER (OUTPATIENT)
Dept: RADIOLOGY | Facility: CLINIC | Age: 73
Discharge: HOME | End: 2018-08-20
Attending: PODIATRIST
Payer: MEDICARE

## 2018-08-20 ENCOUNTER — TELEPHONE (OUTPATIENT)
Dept: NEUROLOGY | Facility: CLINIC | Age: 73
End: 2018-08-20

## 2018-08-20 ENCOUNTER — OFFICE VISIT (OUTPATIENT)
Dept: PODIATRY | Facility: CLINIC | Age: 73
End: 2018-08-20
Payer: MEDICARE

## 2018-08-20 VITALS — WEIGHT: 299 LBS | BODY MASS INDEX: 44.28 KG/M2 | HEIGHT: 69 IN

## 2018-08-20 DIAGNOSIS — I73.9 PERIPHERAL VASCULAR DISEASE: ICD-10-CM

## 2018-08-20 DIAGNOSIS — Z87.898 HISTORY OF ULCERATION: Primary | ICD-10-CM

## 2018-08-20 DIAGNOSIS — E11.42 DIABETIC POLYNEUROPATHY ASSOCIATED WITH TYPE 2 DIABETES MELLITUS: ICD-10-CM

## 2018-08-20 DIAGNOSIS — L97.512 SKIN ULCER OF RIGHT FOOT WITH FAT LAYER EXPOSED: ICD-10-CM

## 2018-08-20 PROCEDURE — 3044F HG A1C LEVEL LT 7.0%: CPT | Mod: CPTII,S$GLB,, | Performed by: PODIATRIST

## 2018-08-20 PROCEDURE — 99212 OFFICE O/P EST SF 10 MIN: CPT | Mod: S$GLB,,, | Performed by: PODIATRIST

## 2018-08-20 PROCEDURE — 73630 X-RAY EXAM OF FOOT: CPT | Mod: TC,FY,PO,RT

## 2018-08-20 PROCEDURE — 99999 PR PBB SHADOW E&M-EST. PATIENT-LVL III: CPT | Mod: PBBFAC,,, | Performed by: PODIATRIST

## 2018-08-20 PROCEDURE — 73630 X-RAY EXAM OF FOOT: CPT | Mod: 26,RT,S$GLB, | Performed by: RADIOLOGY

## 2018-08-20 NOTE — TELEPHONE ENCOUNTER
Spoke to Pt daughter she verbalized her dad's appt       ----- Message from Kaykay Massey sent at 8/20/2018 11:18 AM CDT -----  Contact: Pt   I'm not sure why they're inquiring about results because I don't think testing was done. May need to be scheduled; please call.    ----- Message -----  From: Johanny Hendricks MA  Sent: 8/20/2018  10:01 AM  To: Kaykay Massey     Please advise about NP appointment.  ----- Message -----  From: Dahiana Mancuso  Sent: 8/20/2018   9:16 AM  To: Steven [facility] Staff    Name of Who is Calling: Citlali patient's daughter       What is the request in detail: Citlali patient's daughter would like a callback from staff in regards to the patient's results. Please contact to further discuss and advise      Can the clinic reply by [facility]: No      What Number to Call Back if not in [facility]: [phone]

## 2018-08-20 NOTE — PROGRESS NOTES
Subjective:      Patient ID: Richard Bustos is a 72 y.o. male.    Chief Complaint: Foot Ulcer (right foot)    Richard is a 72 y.o. male who presents to the clinic for evaluation and treatment of high risk feet. Richard has a past medical history of Anemia, Anemia of other chronic disease (9/13/2017), Anemia, chronic renal failure (9/13/2017), Anemia, unspecified (9/13/2017), Anticoagulant long-term use, Aorta aneurysm, Arthritis, Atrial fibrillation, CAD (coronary artery disease), CHF (congestive heart failure), Chronic kidney disease, Clotting disorder (9/13/2017), Colon polyp, Diabetes mellitus, Diabetes mellitus type II, Diverticulosis, Elevated PSA, Encounter for blood transfusion, Former smoker, GERD (gastroesophageal reflux disease), Gout, colonic polyps, Hypercoagulable state, Hyperlipidemia, Hypertension, MI, old (2010), Myocardial infarction, Osteomyelitis of ankle or foot (3/9/2017), Prostate cancer (06/2016), Sleep apnea, and Venous stasis. The patient's chief complaint is diabetic foot ulcer, plantar right 1st mtpj closed for past week. Football CDI  Ambulating more this week in sx shoe right, DM shoes, inserts left..  xrays negative osteolysis, negative foreign body, negative gas.      PCP: Familia Ramirez Jr, MD    Date Last Seen by PCP:   Chief Complaint   Patient presents with    Foot Ulcer     right foot         Current shoe gear:  Affected Foot: Rx diabetic extra depth shoes and custom accommodative insoles     Unaffected Foot: Rx diabetic extra depth shoes and custom accommodative insoles    History of Trauma: negative  Sign of Infection: none    Hemoglobin A1C   Date Value Ref Range Status   07/19/2018 6.7 (H) 4.0 - 5.6 % Final     Comment:     ADA Screening Guidelines:  5.7-6.4%  Consistent with prediabetes  >or=6.5%  Consistent with diabetes  High levels of fetal hemoglobin interfere with the HbA1C  assay. Heterozygous hemoglobin variants (HbS, HgC, etc)do  not significantly interfere with this  assay.   However, presence of multiple variants may affect accuracy.     05/12/2018 6.3 (H) 4.0 - 5.6 % Final     Comment:     According to ADA guidelines, hemoglobin A1c <7.0% represents  optimal control in non-pregnant diabetic patients. Different  metrics may apply to specific patient populations.   Standards of Medical Care in Diabetes-2016.  For the purpose of screening for the presence of diabetes:  <5.7%     Consistent with the absence of diabetes  5.7-6.4%  Consistent with increasing risk for diabetes   (prediabetes)  >or=6.5%  Consistent with diabetes  Currently, no consensus exists for use of hemoglobin A1c  for diagnosis of diabetes for children.  This Hemoglobin A1c assay has significant interference with fetal   hemoglobin   (HbF). The results are invalid for patients with abnormal amounts of   HbF,   including those with known Hereditary Persistence   of Fetal Hemoglobin. Heterozygous hemoglobin variants (HbAS, HbAC,   HbAD, HbAE, HbA2) do not significantly interfere with this assay;   however, presence of multiple variants in a sample may impact the %   interference.     09/19/2017 6.7 (H) 4.0 - 5.6 % Final     Comment:     According to ADA guidelines, hemoglobin A1c <7.0% represents  optimal control in non-pregnant diabetic patients. Different  metrics may apply to specific patient populations.   Standards of Medical Care in Diabetes-2016.  For the purpose of screening for the presence of diabetes:  <5.7%     Consistent with the absence of diabetes  5.7-6.4%  Consistent with increasing risk for diabetes   (prediabetes)  >or=6.5%  Consistent with diabetes  Currently, no consensus exists for use of hemoglobin A1c  for diagnosis of diabetes for children.  This Hemoglobin A1c assay has significant interference with fetal   hemoglobin   (HbF). The results are invalid for patients with abnormal amounts of   HbF,   including those with known Hereditary Persistence   of Fetal Hemoglobin. Heterozygous  hemoglobin variants (HbAS, HbAC,   HbAD, HbAE, HbA2) do not significantly interfere with this assay;   however, presence of multiple variants in a sample may impact the %   interference.     06/17/2013 6.8 (H) 4.8 - 5.6 % Final     Comment:              Increased risk for diabetes: 5.7 - 6.4           Diabetes: >6.4           Glycemic control for adults with diabetes: <7.0  **In order to standardize %HbA1c results worldwide, as of October 11, 2010,  the %HbA1c is being calculated using the master equation recommended in the  consensus statement adopted by the ADA (American Diabetes Assoc), EASD  (European Assoc for the Study of Diabetes), IFCC (International Federation  of Clinical Chemistry and Laboratory Medicine) and IDF (International  Diabetes Federation). Result units: %HgbA1c (DCCT/NGSP).  In common with other methods, Hb A1C values may not accurately reflect mean  blood glucose in patients with hemoglobin variants (HgbF, HgbS and HgbC).  Any cause of shortened erythrocyte survival will reduce exposure of  erythrocytes to glucose with a consequent decrease in HbA1c (%) values, even  though the time-averaged blood glucose level may be elevated. Causes of  shortened erythrocyte lifetime might be hemolytic anemia or other hemolytic  diseases, homozygous sickle cell trait, pregnancy, recent significant or  chronic blood loss, etc. Caution should be used when interpreting the HbA1c  results from patients with these conditions.       Review of Systems   Constitution: Negative for chills, fever, malaise/fatigue and night sweats.   Cardiovascular: Positive for leg swelling. Negative for claudication and cyanosis.   Skin: Positive for poor wound healing. Negative for color change, itching, rash and suspicious lesions.   Musculoskeletal: Negative for falls, gout, joint pain and myalgias.   Neurological: Positive for numbness and sensory change.           Objective:      Physical Exam   Constitutional: He is oriented to  person, place, and time. He appears well-developed and well-nourished. He is cooperative. No distress.   Cardiovascular:   Pulses:       Popliteal pulses are 2+ on the right side, and 2+ on the left side.        Dorsalis pedis pulses are 1+ on the right side, and 1+ on the left side.        Posterior tibial pulses are 1+ on the right side, and 1+ on the left side.   Capillary refill 3 seconds all toes/distal feet, all toes/both feet warm to touch.      Negative lymphadenopathy bilateral popliteal fossa and tarsal tunnel.      Negavie lower extremity edema bilateral.     Musculoskeletal:        Right ankle: He exhibits normal range of motion, no swelling, no ecchymosis, no deformity, no laceration and normal pulse. Achilles tendon normal. Achilles tendon exhibits no pain, no defect and normal Chung's test results.   Patient has hammertoes of digits  2-5 bilateral           reducible without symptom today.     Lymphadenopathy: No inguinal adenopathy noted on the right or left side.   Negative lymphadenopathy bilateral popliteal fossa and tarsal tunnel.    Negative lymphangitic streaking bilateral feet/ankles/legs.   Neurological: He is alert and oriented to person, place, and time. He has normal strength. He displays no atrophy and no tremor. A sensory deficit is present. He exhibits normal muscle tone. Gait normal.   Reflex Scores:       Patellar reflexes are 2+ on the right side and 2+ on the left side.       Achilles reflexes are 2+ on the right side and 2+ on the left side.  Paresthesias, and burning bilateral feet with no clearly identified trigger or source.    Diminished/loss of protective sensation all toes bilateral to 10 gram monofilament.     Skin: Skin is warm, dry and intact. Capillary refill takes 2 to 3 seconds. No abrasion, no bruising, no burn, no ecchymosis, no laceration, no lesion and no rash noted. He is not diaphoretic. No cyanosis or erythema. No pallor. Nails show no clubbing.     Wound  plantar right 1st mtpj remains closed today, epithelialized  without ulceration, drainage, pus, tracking, fluctuance, malodor, or cardinal signs infection.      Otherwise, Skin thin, shiny, atrophic, with decreased density and distribution of pedal hair bilateral, but without  minerva discoloration,  ulcers, masses, nodules or cords palpated bilateral feet and legs.    Hyperpigmentation both legs consistent with stasis.   Psychiatric: He has a normal mood and affect.             Assessment:       Encounter Diagnoses   Name Primary?    History of ulceration Yes    Diabetic polyneuropathy associated with type 2 diabetes mellitus     Peripheral vascular disease          Plan:       Richard was seen today for foot ulcer.    Diagnoses and all orders for this visit:    History of ulceration    Diabetic polyneuropathy associated with type 2 diabetes mellitus    Peripheral vascular disease      I counseled the patient on his conditions, their implications and medical management.    Inspect feet multiple times daily for signs of occurrence/recurrence ulceration.    Begin return with aperture right 1st mtpj as needed and return to DM shoes, inserts slow and gradual introduction of activity.    Adequate vitamin supplementation, protein intake, and hydration - discussed with patient.    Fill Rx new DM Shoes, custom inserts.              Follow-up if symptoms worsen or fail to improve.

## 2018-08-21 ENCOUNTER — INITIAL CONSULT (OUTPATIENT)
Dept: NEUROLOGY | Facility: CLINIC | Age: 73
End: 2018-08-21
Payer: MEDICARE

## 2018-08-21 DIAGNOSIS — N18.30 CKD (CHRONIC KIDNEY DISEASE) STAGE 3, GFR 30-59 ML/MIN: ICD-10-CM

## 2018-08-21 DIAGNOSIS — E11.59 HYPERTENSION ASSOCIATED WITH DIABETES: ICD-10-CM

## 2018-08-21 DIAGNOSIS — E11.42 DIABETIC POLYNEUROPATHY ASSOCIATED WITH TYPE 2 DIABETES MELLITUS: ICD-10-CM

## 2018-08-21 DIAGNOSIS — I48.0 PAROXYSMAL ATRIAL FIBRILLATION: ICD-10-CM

## 2018-08-21 DIAGNOSIS — I25.10 CORONARY ARTERY DISEASE INVOLVING NATIVE CORONARY ARTERY OF NATIVE HEART WITHOUT ANGINA PECTORIS: Chronic | ICD-10-CM

## 2018-08-21 DIAGNOSIS — I15.2 HYPERTENSION ASSOCIATED WITH DIABETES: ICD-10-CM

## 2018-08-21 DIAGNOSIS — F41.9 ANXIETY: ICD-10-CM

## 2018-08-21 DIAGNOSIS — R41.3 MEMORY LOSS: ICD-10-CM

## 2018-08-21 DIAGNOSIS — G47.33 OBSTRUCTIVE SLEEP APNEA SYNDROME: Chronic | ICD-10-CM

## 2018-08-21 DIAGNOSIS — I73.9 PERIPHERAL VASCULAR DISEASE: ICD-10-CM

## 2018-08-21 DIAGNOSIS — F01.A0 MAJOR NEUROCOGNITIVE DISORDER, DUE TO VASCULAR DISEASE, WITHOUT BEHAVIORAL DISTURBANCE, MILD: ICD-10-CM

## 2018-08-21 DIAGNOSIS — E78.00 PURE HYPERCHOLESTEROLEMIA: ICD-10-CM

## 2018-08-21 PROCEDURE — 96119 PR NEUROPSYCH TESTING BY TECHNICIAN: CPT | Mod: 59,S$GLB,, | Performed by: PSYCHIATRY & NEUROLOGY

## 2018-08-21 PROCEDURE — 96118 PR NEUROPSYCH TESTING BY PSYCH/PHYS: CPT | Mod: S$GLB,,, | Performed by: PSYCHIATRY & NEUROLOGY

## 2018-08-21 PROCEDURE — 99499 UNLISTED E&M SERVICE: CPT | Mod: S$GLB,,, | Performed by: PSYCHIATRY & NEUROLOGY

## 2018-08-21 PROCEDURE — 90791 PSYCH DIAGNOSTIC EVALUATION: CPT | Mod: S$GLB,,, | Performed by: PSYCHIATRY & NEUROLOGY

## 2018-08-21 NOTE — PROGRESS NOTES
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 noise) to promote sleep readiness.   vi. Visit the www.sleepfoundation.org for more strategies.   d. Practice good cognitive hygiene:  i. Engage in regular exercise, which increases alertness and arousal and can improve attention and focus.  Talk with your treatment providers about recommended exercises.  ii. Get a good nights sleep, as this can enhance alertness and cognition.  iii. Eat healthy foods and balanced meals. It is notable that research indicates certain nutrients may aid in brain function, such as B vitamins (especially B6, B12, and folic acid), antioxidants (such as vitamins C and E, and beta carotene), and Omega-3 fatty acids. Talk with your physician or nutritionist about whats right for you.   iv. Keep your brain active. Find activities to stay mentally active, such as reading, games (cards, checkers), puzzles (crosswords, Sudoku, jig saw), crafts (models, woodworking), gardening, or participating in activities in the community.  v. Stay socially engaged. Continue staying active with your family and friends.  e. Monitor your mood:  You reported symptoms of increased stress related to grief and external stressors; implementing the recommendations above may also help to improve mood.  If you or your loved ones notice increased symptoms, I recommend  individual therapy to help you more effectively manage symptoms.  f. Prepare for the future: Mr. Bustos and caregivers should consider formal arrangements to allow a designated person to make medical and financial decisions for Mr. Bustos, should he become unable to do so.  They already have designated power of  for healthcare and financial decisions. Other options to consider include completing advanced directives for healthcare decisions and estate planning (e.g., finalizing a will).  If cost is prohibitive, [facility] (https://[employer]/) provides free  for individuals with low income.  g. Resources: Consider resources for support through the [facility] Office of Elderly Affairs (http://goea.[place].[facility]/), [place] Chapter of the Alzheimers Association (www.alz.org/[place]/), the Family Caregiver [place] (www.caregiver.org), and the American Psychological Association (http://www.apa.org/pi/about/publications/caregivers/consumers/index.aspxconsumers/index.aspx).    Thank you for allowing me to participate in Mr. Bustos's care.  If you have any questions, please contact me at [phone].    Lolita Foster, Ph.D., ABPP  Board Certified in Clinical Neuropsychology  [facility] - Department of Neurology    APPENDIX    Neuropsychological ASSESSMENT Results    The following should not be interpreted in isolation from the neuropsychological evaluation report.  Scores on stand-alone and embedded performance validity measures were variable but consistent with reported areas of difficulty.    Percentile Interpretation:       </=2nd......................................Impaired       3rd-8th.....................................Borderline       9th-24th...................................Low Average       25th-75th...................................Average       76th-91st...................................High Average        92nd-97th...................................Superior       >97th.....................................Very Superior     Test, Subtests, Indices, Composites Raw Score Standardized Score (Standard Score, Index, Scaled Score, Z-score, T-score) Percentile/Cumulative Percentage   Cognitive Battery   DRS-2      Attention 33 7 11-18   Initiation/Perseveration 37 11 60-71   Construction 5 7 11-18   Conceptualization 33 7 11-18   Memory 18 4 2   Total 126 5 3-5   Total AEMSS - 5 5         RBANS (Form A)      Immediate Memory - 61 <1   List Learning 15 3 1   Story Memory 9 4 2   Visuospatial/Construction - 89 23   Figure Copy 17 9 37   Line Orientation 15 - 26-50   Language - 90 25   Naming 10 - 51-75   Fluency 14 6 9   Attention - 85 16   Digit Span 9 8 25   Coding 30 7 16   Delayed Memory - 64 1   List Recall 0 - <2   List Recognition 17 - 10-16   Story Recall 3 4 2   Figure Recall 8 7 16   Total Score 389 72 3   Premorbid Estimate   TOPF 13 73 Predicted FSIQ= 84   Attention/Processing Speed/Executive Functioning   TMT      Trails A 42, 0 errors 36 8   Trails B DC at L, 7 errors - -         COWAT      FAS 20 36 8   F 9 - -   A 6 - -   S 5 - -   Visuospatial Functioning   Clock Drawing 8/10 - 12   Drawing 4//5 - 49   Copy 4/5 - 12   Emotional Functioning   GDS 6/30 - Minimal

## 2018-08-21 NOTE — Clinical Note
Here are the results of Mr. Bustos's evaluation. Please let me know if you have any questions.Best,Lolita

## 2018-08-26 NOTE — PATIENT INSTRUCTIONS
Thank you for working so hard today. We look forward to discussing the results and treatment plan with you. As a reminder:   Take care of your brain by getting adequate sleep, eating nutritious meals, and maintaining physical and social activity.   Importantly, we value feedback and hope your visit with us was excellent. If you feel we have given you anything less than excellent service, please call us to let us know how we can improve. You can contact our staff at [phone]. o You may receive a survey through the mail or email. Please complete and send back as your feedback is very valuable to us.

## 2018-08-30 ENCOUNTER — TELEPHONE (OUTPATIENT)
Dept: NEUROLOGY | Facility: CLINIC | Age: 73
End: 2018-08-30

## 2018-08-30 NOTE — TELEPHONE ENCOUNTER
NEUROPSYCHOLOGICAL EVALUATION FEEDBACK    Richard Bustos attended a feedback session today via telephone, along with his daughter.  We discussed the results of the neuropsychological evaluation ( minutes).  I provided Mr. Bustos with a copy of the evaluation report via mail and gave time to discuss questions and concerns.    Lolita Foster, Ph.D., ABPP  Board Certified in Clinical Neuropsychology  [facility] - Department of Neurology

## 2018-09-07 RX ORDER — CARVEDILOL 25 MG/1
TABLET ORAL
Qty: 180 TABLET | Refills: 3 | Status: SHIPPED | OUTPATIENT
Start: 2018-09-07 | End: 2018-10-23

## 2018-09-11 DIAGNOSIS — M54.5 CHRONIC LOW BACK PAIN, UNSPECIFIED BACK PAIN LATERALITY, WITH SCIATICA PRESENCE UNSPECIFIED: ICD-10-CM

## 2018-09-11 DIAGNOSIS — G89.29 CHRONIC LOW BACK PAIN, UNSPECIFIED BACK PAIN LATERALITY, WITH SCIATICA PRESENCE UNSPECIFIED: ICD-10-CM

## 2018-09-11 DIAGNOSIS — F41.9 ANXIETY: ICD-10-CM

## 2018-09-11 RX ORDER — HYDROCODONE BITARTRATE AND ACETAMINOPHEN 7.5; 325 MG/1; MG/1
1 TABLET ORAL EVERY 6 HOURS PRN
Qty: 90 TABLET | Refills: 0 | Status: SHIPPED | OUTPATIENT
Start: 2018-09-11 | End: 2018-10-11 | Stop reason: SDUPTHER

## 2018-09-11 RX ORDER — ALPRAZOLAM 1 MG/1
TABLET ORAL
Qty: 30 TABLET | Refills: 0 | Status: SHIPPED | OUTPATIENT
Start: 2018-09-11 | End: 2018-10-11 | Stop reason: SDUPTHER

## 2018-09-11 NOTE — TELEPHONE ENCOUNTER
----- Message from Akosua Sarabia sent at 9/11/2018 11:19 AM CDT -----  Type:  RX Refill Request    Who Called:  Patient  Refill or New Rx:  Refill   RX Name and Strength:  ALPRAZolam (XANAX) 1 MG tablet and HYDROcodone-acetaminophen (NORCO) 7.5-325 mg per tablet   Preferred Pharmacy with phone number:    [facility] [id number] - [place]  [address] AT [facility] [address] [address] [address]  Phone: [phone] Fax: [phone]  Local or Mail Order:  Local  Ordering Provider:  same  Best Call Back Number:  [phone]  Additional Information:  Please call when complete.

## 2018-09-14 ENCOUNTER — TELEPHONE (OUTPATIENT)
Dept: HEMATOLOGY/ONCOLOGY | Facility: CLINIC | Age: 73
End: 2018-09-14

## 2018-09-14 NOTE — TELEPHONE ENCOUNTER
I spoke with pt daughter Citlali regarding labs that need to be done before his next appointment. I faxed the orders to [facility]. She is aware and states that she will get him over to do his labs before his apt.

## 2018-09-18 ENCOUNTER — OFFICE VISIT (OUTPATIENT)
Dept: NEUROLOGY | Facility: CLINIC | Age: 73
End: 2018-09-18
Payer: MEDICARE

## 2018-09-18 VITALS
SYSTOLIC BLOOD PRESSURE: 118 MMHG | BODY MASS INDEX: 44.43 KG/M2 | HEIGHT: 69 IN | WEIGHT: 300 LBS | DIASTOLIC BLOOD PRESSURE: 77 MMHG | HEART RATE: 66 BPM

## 2018-09-18 DIAGNOSIS — I48.0 PAROXYSMAL ATRIAL FIBRILLATION: ICD-10-CM

## 2018-09-18 DIAGNOSIS — G47.33 OBSTRUCTIVE SLEEP APNEA SYNDROME: Chronic | ICD-10-CM

## 2018-09-18 DIAGNOSIS — N18.30 CKD (CHRONIC KIDNEY DISEASE) STAGE 3, GFR 30-59 ML/MIN: ICD-10-CM

## 2018-09-18 DIAGNOSIS — E11.42 DIABETIC POLYNEUROPATHY ASSOCIATED WITH TYPE 2 DIABETES MELLITUS: ICD-10-CM

## 2018-09-18 DIAGNOSIS — I73.9 PERIPHERAL VASCULAR DISEASE: ICD-10-CM

## 2018-09-18 DIAGNOSIS — I25.10 CORONARY ARTERY DISEASE INVOLVING NATIVE CORONARY ARTERY OF NATIVE HEART WITHOUT ANGINA PECTORIS: Chronic | ICD-10-CM

## 2018-09-18 DIAGNOSIS — G31.84 MCI (MILD COGNITIVE IMPAIRMENT): Primary | ICD-10-CM

## 2018-09-18 DIAGNOSIS — E78.00 PURE HYPERCHOLESTEROLEMIA: ICD-10-CM

## 2018-09-18 DIAGNOSIS — R41.3 MEMORY LOSS: ICD-10-CM

## 2018-09-18 DIAGNOSIS — I15.2 HYPERTENSION ASSOCIATED WITH DIABETES: ICD-10-CM

## 2018-09-18 DIAGNOSIS — E11.59 HYPERTENSION ASSOCIATED WITH DIABETES: ICD-10-CM

## 2018-09-18 DIAGNOSIS — F41.9 ANXIETY: ICD-10-CM

## 2018-09-18 PROCEDURE — 3078F DIAST BP <80 MM HG: CPT | Mod: CPTII,,, | Performed by: PSYCHIATRY & NEUROLOGY

## 2018-09-18 PROCEDURE — 99213 OFFICE O/P EST LOW 20 MIN: CPT | Mod: PBBFAC | Performed by: PSYCHIATRY & NEUROLOGY

## 2018-09-18 PROCEDURE — 1101F PT FALLS ASSESS-DOCD LE1/YR: CPT | Mod: CPTII,,, | Performed by: PSYCHIATRY & NEUROLOGY

## 2018-09-18 PROCEDURE — 99999 PR PBB SHADOW E&M-EST. PATIENT-LVL III: CPT | Mod: PBBFAC,,, | Performed by: PSYCHIATRY & NEUROLOGY

## 2018-09-18 PROCEDURE — 3044F HG A1C LEVEL LT 7.0%: CPT | Mod: CPTII,,, | Performed by: PSYCHIATRY & NEUROLOGY

## 2018-09-18 PROCEDURE — 99214 OFFICE O/P EST MOD 30 MIN: CPT | Mod: S$PBB,,, | Performed by: PSYCHIATRY & NEUROLOGY

## 2018-09-18 PROCEDURE — 3074F SYST BP LT 130 MM HG: CPT | Mod: CPTII,,, | Performed by: PSYCHIATRY & NEUROLOGY

## 2018-09-18 RX ORDER — MEMANTINE HYDROCHLORIDE 5 MG/1
5 TABLET ORAL 2 TIMES DAILY
Qty: 60 TABLET | Refills: 3 | Status: SHIPPED | OUTPATIENT
Start: 2018-09-18 | End: 2019-01-09 | Stop reason: SDUPTHER

## 2018-09-18 NOTE — PATIENT INSTRUCTIONS
Discussed with patient and son.  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  He has multiple vascular risk factors as noted above.  Control of vascular risk factors specially diabetes is important.  Results of the neuropsychological testing reviewed with patient. Will initiate Namenda 5 mg twice a day.
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
## 2018-09-24 PROBLEM — K92.2 GASTROINTESTINAL HEMORRHAGE: Status: ACTIVE | Noted: 2018-09-24

## 2018-09-28 ENCOUNTER — OFFICE VISIT (OUTPATIENT)
Dept: DERMATOLOGY | Facility: CLINIC | Age: 73
End: 2018-09-28
Payer: MEDICARE

## 2018-09-28 VITALS — HEIGHT: 69 IN | WEIGHT: 300 LBS | BODY MASS INDEX: 44.43 KG/M2

## 2018-09-28 DIAGNOSIS — I87.2 VENOUS STASIS DERMATITIS OF BOTH LOWER EXTREMITIES: ICD-10-CM

## 2018-09-28 DIAGNOSIS — L57.0 ACTINIC KERATOSES: Primary | ICD-10-CM

## 2018-09-28 DIAGNOSIS — Z85.828 HISTORY OF NONMELANOMA SKIN CANCER: ICD-10-CM

## 2018-09-28 DIAGNOSIS — L82.1 SEBORRHEIC KERATOSES: ICD-10-CM

## 2018-09-28 DIAGNOSIS — D69.2 SENILE PURPURA: ICD-10-CM

## 2018-09-28 DIAGNOSIS — L81.4 SOLAR LENTIGO: ICD-10-CM

## 2018-09-28 PROCEDURE — 17000 DESTRUCT PREMALG LESION: CPT | Mod: S$GLB,,, | Performed by: DERMATOLOGY

## 2018-09-28 PROCEDURE — 17003 DESTRUCT PREMALG LES 2-14: CPT | Mod: S$GLB,,, | Performed by: DERMATOLOGY

## 2018-09-28 PROCEDURE — 17003 DESTRUCT PREMALG LES 2-14: CPT | Mod: PBBFAC,PO | Performed by: DERMATOLOGY

## 2018-09-28 PROCEDURE — 17000 DESTRUCT PREMALG LESION: CPT | Mod: PBBFAC,PO | Performed by: DERMATOLOGY

## 2018-09-28 PROCEDURE — 99999 PR PBB SHADOW E&M-EST. PATIENT-LVL III: CPT | Mod: PBBFAC,,, | Performed by: DERMATOLOGY

## 2018-09-28 PROCEDURE — 99213 OFFICE O/P EST LOW 20 MIN: CPT | Mod: PBBFAC,PO | Performed by: DERMATOLOGY

## 2018-09-28 PROCEDURE — 99213 OFFICE O/P EST LOW 20 MIN: CPT | Mod: 25,S$GLB,, | Performed by: DERMATOLOGY

## 2018-09-28 RX ORDER — TRIAMCINOLONE ACETONIDE 1 MG/G
CREAM TOPICAL
Qty: 60 G | Refills: 3 | Status: SHIPPED | OUTPATIENT
Start: 2018-09-28 | End: 2019-06-18

## 2018-09-28 NOTE — PATIENT INSTRUCTIONS

## 2018-09-28 NOTE — PROGRESS NOTES
Subjective:       Patient ID:  Richard Bustos is a 73 y.o. male who presents for   Chief Complaint   Patient presents with    Skin Check     6 month UBSE    Dry Skin     lower legs     Patient last seen 03/2018    Endorses lilfelong intense sun exposure  H/o SCCIS left helix, treated with efudex daily x 4 weeks 01/2018    1. Skin, left helix, shave biopsy:  - SQUAMOUS CELL CARCINOMA IN SITU.  - THE TUMOR CLOSELY APPROACHES A LATERAL BIOPSY MARGIN.     This is a high risk patient here to check for the development of new lesions.        Dry Skin  - Initial  Affected locations: right lower leg and left lower leg  Duration: 2 years  Signs / symptoms: dryness and peeling  Severity: mild  Timing: constant  Aggravated by: nothing  Treatments tried: Vaseline.  Improvement on treatment: no relief        Review of Systems   Constitutional: Negative for fever, chills and fatigue.   Skin: Positive for dry skin, activity-related sunscreen use and wears hat. Negative for daily sunscreen use.   Hematologic/Lymphatic: Bruises/bleeds easily.        Objective:    Physical Exam   Constitutional: He appears well-developed and well-nourished. No distress.   Neurological: He is alert and oriented to person, place, and time. He is not disoriented.   Psychiatric: He has a normal mood and affect.   Skin:   Areas Examined (abnormalities noted in diagram):   Scalp / Hair Palpated and Inspected  Head / Face Inspection Performed  Neck Inspection Performed  Chest / Axilla Inspection Performed  Abdomen Inspection Performed  Back Inspection Performed  RUE Inspected  LUE Inspection Performed  Nails and Digits Inspection Performed                   Diagram Legend     Erythematous scaling macule/papule c/w actinic keratosis       Vascular papule c/w angioma      Pigmented verrucoid papule/plaque c/w seborrheic keratosis      Yellow umbilicated papule c/w sebaceous hyperplasia      Irregularly shaped tan macule c/w lentigo     1-2 mm smooth white  papules consistent with Milia      Movable subcutaneous cyst with punctum c/w epidermal inclusion cyst      Subcutaneous movable cyst c/w pilar cyst      Firm pink to brown papule c/w dermatofibroma      Pedunculated fleshy papule(s) c/w skin tag(s)      Evenly pigmented macule c/w junctional nevus     Mildly variegated pigmented, slightly irregular-bordered macule c/w mildly atypical nevus      Flesh colored to evenly pigmented papule c/w intradermal nevus       Pink pearly papule/plaque c/w basal cell carcinoma      Erythematous hyperkeratotic cursted plaque c/w SCC      Surgical scar with no sign of skin cancer recurrence      Open and closed comedones      Inflammatory papules and pustules      Verrucoid papule consistent consistent with wart     Erythematous eczematous patches and plaques     Dystrophic onycholytic nail with subungual debris c/w onychomycosis     Umbilicated papule    Erythematous-base heme-crusted tan verrucoid plaque consistent with inflamed seborrheic keratosis     Erythematous Silvery Scaling Plaque c/w Psoriasis     See annotation      Assessment / Plan:        Actinic keratoses  Cryosurgery Procedure Note    Verbal consent from the patient is obtained and the patient is aware of the precancerous quality and need for treatment of these lesions. Liquid nitrogen cryosurgery is applied to the 2 actinic keratoses, as detailed in the physical exam, to produce a freeze injury. The patient is aware that blisters may form and is instructed on wound care with gentle cleansing and use of vaseline ointment to keep moist until healed. The patient is supplied a handout on cryosurgery and is instructed to call if lesions do not completely resolve.    History of nonmelanoma skin cancer  Area of previous NMSC (left helix) examined. Site well healed with no signs of recurrence.  Upper body skin examination performed today including at least 9 points as noted in physical examination. No lesions suspicious  for malignancy noted.    Seborrheic keratoses  These are benign inherited growths without a malignant potential. Reassurance given to patient. No treatment is necessary.     Solar lentigo  This is a benign hyperpigmented sun induced lesion. Daily sun protection will reduce the number of new lesions. Treatment of these benign lesions are considered cosmetic.    Senile purpura  Common finding in aging sun damaged skin; coumadin contributes    Venous stasis dermatitis of both lower extremities  Recommend frequent leg elevation  Recommend compression hose (at least 18mm HG).  Patient is under the care of vascular surgery.    TAC cream daily PRN itch  Emollient creams daily           No Follow-up on file.

## 2018-10-01 ENCOUNTER — TELEPHONE (OUTPATIENT)
Dept: PODIATRY | Facility: CLINIC | Age: 73
End: 2018-10-01

## 2018-10-01 ENCOUNTER — TELEPHONE (OUTPATIENT)
Dept: HEMATOLOGY/ONCOLOGY | Facility: CLINIC | Age: 73
End: 2018-10-01

## 2018-10-01 DIAGNOSIS — Z15.89 MTHFR MUTATION (METHYLENETETRAHYDROFOLATE REDUCTASE): ICD-10-CM

## 2018-10-01 DIAGNOSIS — C61 PROSTATE CANCER: Primary | ICD-10-CM

## 2018-10-01 NOTE — TELEPHONE ENCOUNTER
----- Message from Jeremiah Martinez sent at 10/1/2018  9:26 AM CDT -----  Contact: Citlali Frye (Daughter)  Name of Who is Calling: Citlali      What is the request in detail: Citlali is calling requesting to have the patient seen on Wednesday morning due to patient's wound draining/bleeding      Can the clinic reply by [facility]: no      What Number to Call Back if not in [facility]: [phone]

## 2018-10-03 ENCOUNTER — OFFICE VISIT (OUTPATIENT)
Dept: HEMATOLOGY/ONCOLOGY | Facility: CLINIC | Age: 73
End: 2018-10-03
Payer: MEDICARE

## 2018-10-03 VITALS
WEIGHT: 290.88 LBS | DIASTOLIC BLOOD PRESSURE: 60 MMHG | TEMPERATURE: 98 F | SYSTOLIC BLOOD PRESSURE: 105 MMHG | BODY MASS INDEX: 42.96 KG/M2 | RESPIRATION RATE: 20 BRPM | HEART RATE: 70 BPM

## 2018-10-03 DIAGNOSIS — Z15.89 MTHFR MUTATION (METHYLENETETRAHYDROFOLATE REDUCTASE): ICD-10-CM

## 2018-10-03 DIAGNOSIS — Z98.84 H/O BARIATRIC SURGERY: ICD-10-CM

## 2018-10-03 DIAGNOSIS — Z79.01 LONG TERM (CURRENT) USE OF ANTICOAGULANTS: ICD-10-CM

## 2018-10-03 DIAGNOSIS — K62.5 GASTROINTESTINAL HEMORRHAGE ASSOCIATED WITH ANORECTAL SOURCE: ICD-10-CM

## 2018-10-03 DIAGNOSIS — D68.59 HYPERCOAGULABLE STATE: Primary | ICD-10-CM

## 2018-10-03 DIAGNOSIS — D63.8 ANEMIA, CHRONIC DISEASE: ICD-10-CM

## 2018-10-03 PROCEDURE — 3078F DIAST BP <80 MM HG: CPT | Mod: ,,, | Performed by: INTERNAL MEDICINE

## 2018-10-03 PROCEDURE — 1111F DSCHRG MED/CURRENT MED MERGE: CPT | Mod: ,,, | Performed by: INTERNAL MEDICINE

## 2018-10-03 PROCEDURE — 1101F PT FALLS ASSESS-DOCD LE1/YR: CPT | Mod: ,,, | Performed by: INTERNAL MEDICINE

## 2018-10-03 PROCEDURE — 3074F SYST BP LT 130 MM HG: CPT | Mod: ,,, | Performed by: INTERNAL MEDICINE

## 2018-10-03 PROCEDURE — 99213 OFFICE O/P EST LOW 20 MIN: CPT | Mod: ,,, | Performed by: INTERNAL MEDICINE

## 2018-10-03 NOTE — PROGRESS NOTES
[facility] Hematology/Oncology  PROGRESS NOTE      Subjective:       Patient ID:   NAME: Richard Bustos : 1945     73 y.o. male    Referring Doc: Ashley  Other Physicians: Bebeto Torrez Chamberlain    Chief Complaint:  Clot disorder f/u    History of Present Illness:     Patient returns today for a regularly scheduled follow-up visit.  The patient is doing ok overall with foot ulcer and infection on right foot resolving. He has been under the care of [facility] and Dr Torrez with podiatry. Right foot healed per patient but has resiudla ulcers on left. He is breathing ok. He denies any current fever, CP, SOB, HA's or N/V.             ROS:   GEN: normal without any fever, night sweats or weight loss  HEENT: normal with no HA's, sore throat, stiff neck, changes in vision  CV: normal with no CP, SOB, PND, THOMPSON or orthopnea  PULM: normal with no SOB, cough, hemoptysis, sputum or pleuritic pain  GI: normal with no abdominal pain, nausea, vomiting, constipation, diarrhea, melanotic stools, BRBPR, or hematemesis  : normal with no hematuria, dysuria  BREAST: normal with no mass, discharge, pain  SKIN: normal with no rash, erythema, bruising, or swelling    Allergies:  Review of patient's allergies indicates:   Allergen Reactions    Shellfish containing products Other (See Comments)     Other reaction(s): Gout       Medications:    Current Outpatient Medications:     alendronate-vitamin D3 (FOSAMAX PLUS D) 70 mg- 2,800 unit per tablet, Take 1 tablet by mouth every 7 days., Disp: 4 tablet, Rfl: 5    allopurinol (ZYLOPRIM) 100 MG tablet, TAKE 1 TABLET(100 MG) BY MOUTH EVERY DAY, Disp: 90 tablet, Rfl: 3    ALPRAZolam (XANAX) 1 MG tablet, TAKE 1 TABLET(1 MG) BY MOUTH EVERY EVENING, Disp: 30 tablet, Rfl: 0    aspirin (ECOTRIN) 81 MG EC tablet, Take 81 mg by mouth once daily., Disp: , Rfl:     atorvastatin (LIPITOR) 80 MG tablet, TAKE 1 TABLET(80 MG) BY MOUTH EVERY DAY, Disp: 90 tablet, Rfl: 3    calcitRIOL (ROCALTROL) 0.5  MCG Cap, TK 1 C PO D, Disp: , Rfl: 4    calcium carbonate (TUMS ULTRA) 400 mg calcium (1,000 mg) Chew, Take 1 tablet (400 mg total) by mouth once daily., Disp: 30 each, Rfl: 11    carvedilol (COREG) 25 MG tablet, TAKE 1 TABLET BY MOUTH TWICE DAILY WITH MEALS, Disp: 180 tablet, Rfl: 3    carvedilol (COREG) 25 MG tablet, TAKE 1 TABLET BY MOUTH TWICE DAILY WITH MEALS, Disp: 180 tablet, Rfl: 3    ergocalciferol (ERGOCALCIFEROL) 50,000 unit Cap, TK 1 C PO Q WEEK, Disp: , Rfl: 0    escitalopram oxalate (LEXAPRO) 10 MG tablet, TAKE 1/2 DAILY FOR FIRST 7 DAYS THEN THEREAFTER TAKE 1 TABLET BY MOUTH DAILY, Disp: 30 tablet, Rfl: 3    ferrous sulfate 325 mg (65 mg iron) Tab tablet, TAKE 1 TABLET BY MOUTH TWICE DAILY, Disp: 180 tablet, Rfl: 1    fluticasone (FLONASE) 50 mcg/actuation nasal spray, 2 sprays by Each Nare route once daily., Disp: 1 Bottle, Rfl: 11    FOLIC ACID/MULTIVIT-MIN/LUTEIN (CENTRUM SILVER ORAL), Take 1 tablet by mouth once daily., Disp: , Rfl:     furosemide (LASIX) 40 MG tablet, TAKE 1 TABLET BY MOUTH DAILY AS NEEDED, Disp: 90 tablet, Rfl: 3    HYDROcodone-acetaminophen (NORCO) 7.5-325 mg per tablet, Take 1 tablet by mouth every 6 (six) hours as needed for Pain., Disp: 90 tablet, Rfl: 0    hydrocortisone 2.5 % cream, Apply topically 2 (two) times daily. for 10 days, Disp: 1 Tube, Rfl: 2    influenza (FLUZONE HIGH-DOSE) 180 mcg/0.5 mL vaccine, Inject into the muscle., Disp: 0.5 mL, Rfl: 0    L-METHYL-B6-B12 3-35-2 mg Tab, TAKE 1 TABLET BY MOUTH TWICE DAILY, Disp: 180 tablet, Rfl: 3    lisinopril (PRINIVIL,ZESTRIL) 40 MG tablet, TAKE 1 TABLET BY MOUTH EVERY EVENING, Disp: 90 tablet, Rfl: 3    MAGOX 400 mg tablet, TAKE 1 TABLET BY MOUTH EVERY DAY, Disp: 90 tablet, Rfl: 3    memantine (NAMENDA) 5 MG Tab, Take 1 tablet (5 mg total) by mouth 2 (two) times daily., Disp: 60 tablet, Rfl: 3    mirabegron (MYRBETRIQ) 50 mg Tb24, Take 1 tablet (50 mg total) by mouth once daily., Disp: 30 tablet, Rfl:  11    nitroGLYCERIN 0.4 MG/DOSE TL SPRY (NITROLINGUAL) 400 mcg/spray spray, PLACE 1 SPRAY UNDER THE TONGUE EVERY 5 MINUTES AS NEEDED FOR CHEST PAIN, Disp: 4.9 g, Rfl: 3    omeprazole (PRILOSEC) 20 MG capsule, TAKE 1 CAPSULE(20 MG) BY MOUTH EVERY DAY, Disp: 90 capsule, Rfl: 3    ranitidine (ZANTAC) 150 MG tablet, TAKE 1 TABLET(150 MG) BY MOUTH TWICE DAILY, Disp: 180 tablet, Rfl: 3    triamcinolone acetonide 0.1% (KENALOG) 0.1 % cream, AAA bid, Disp: 60 g, Rfl: 3    vit C-vit E-lutein-min-om-3 (OCUVITE) 019-54-7-150 mg-unit-mg-mg Cap, Take 1 capsule by mouth once daily., Disp: , Rfl:     warfarin (COUMADIN) 5 MG tablet, TAKE 1 TABLET BY MOUTH EVERY DAY (Patient taking differently: TAKE 1 TABLET BY MOUTH EVERY DAY EXCEPT 1 1/2 TABLETS ON WEDNESDAY), Disp: 90 tablet, Rfl: 3    ipratropium (ATROVENT) 0.02 % nebulizer solution, Take 2.5 mLs (500 mcg total) by nebulization 4 (four) times daily. Rescue, Disp: 1 Box, Rfl: 0    PMHx/PSHx Updates:  See patient's last visit with me on 3/14/2018  See H&P on 4/9/2014        Pathology:  Cancer Staging  No matching staging information was found for the patient.          Objective:     Vitals:  Blood pressure 105/60, pulse 70, temperature 98.1 °F (36.7 °C), resp. rate 20, weight 132 kg (290 lb 14.4 oz).    Physical Examination:   GEN: no apparent distress, comfortable; AAOx3; overweight  HEAD: atraumatic and normocephalic  EYES: no pallor, no icterus, PERRLA  ENT: OMM, no pharyngeal erythema, external ears WNL; no nasal discharge; no thrush  NECK: no masses, thyroid normal, trachea midline, no LAD/LN's, supple  CV: RRR with no murmur; normal pulse; normal S1 and S2; no pedal edema  CHEST: Normal respiratory effort; CTAB; normal breath sounds; no wheeze or crackles  ABDOM: nontender and nondistended; soft; normal bowel sounds; no rebound/guarding; overweight  MUSC/Skeletal: ROM normal; no crepitus; joints normal; no deformities or arthropathy; uses cane to walk  EXTREM:  erythematous changes on bilateral lower extremities; ulcers on left  SKIN: no rashes, petechiae or subcutaneous nodules; skin cancer on face for which he has been on cream  : no mcdaniels  NEURO: grossly intact; motor/sensory WNL; AAOx3; no tremors  PSYCH: normal mood, affect and behavior  LYMPH: normal cervical, supraclavicular, axillary and groin LN's            Labs:     10/1/2018    Lab Results   Component Value Date    WBC 6.40 10/01/2018    HGB 10.8 (L) 10/01/2018    HCT 31.5 (L) 10/01/2018    MCV 99 (H) 10/01/2018     10/01/2018     BMP  Lab Results   Component Value Date     10/01/2018    K 4.2 10/01/2018     10/01/2018    CO2 28 10/01/2018    BUN 36 (H) 10/01/2018    CREATININE 1.4 10/01/2018    CALCIUM 8.6 10/01/2018    ANIONGAP 6 (L) 10/01/2018    ESTGFRAFRICA 58.0 (A) 10/01/2018    EGFRNONAA 50.2 (A) 10/01/2018     Lab Results   Component Value Date    ALT 14 (L) 10/01/2018    AST 22 10/01/2018    ALKPHOS 68 10/01/2018    BILITOT 0.5 10/01/2018     Lab Results   Component Value Date    IRON 46 10/01/2018    TIBC 184 (L) 10/01/2018    FERRITIN 487 (H) 07/19/2018       Lab Results   Component Value Date    INR 1.4 (H) 10/01/2018    INR 1.4 (H) 10/01/2018    INR 1.8 (H) 09/26/2018         Radiology/Diagnostic Studies:    No results found.    I have reviewed all available lab results and radiology reports.    Assessment/Plan:   (1) 73 y.o. male with diagnosis of chronic clot disorder with underlying MTHFR issues  - he was previously under the care of Dr Krunal Rodriguez with hematology at [facility]  - he has been on coumadin therapy per direction of Dr Tate with cardiology  - he has been having it adjusted bt cardiology      (2) CAD - followed by Dr Tate    (3) Abdominal hematoma in past     (4) CRI - followed by Nephrology (Amy?)    (5) Chronic multifactorial anemia with underlying anemia of chronic disorders and chronic renal disease; chronic GIB  - latest hgb at 10.8     (6) Right foot diabetic  ulcer/ prior left toe issues - followed by Dr Torrez - improved per patient    (7) Urology following for prostate - Dr Moss        1. Hypercoagulable state, Prothrombin mutation     2. Long term (current) use of anticoagulants     3. Anemia, chronic disease     4. MTHFR mutation (methylenetetrahydrofolate reductase)     5. H/O bariatric surgery, 2008     6. Gastrointestinal hemorrhage associated with anorectal source           PLAN:  1. Check labs every 3 months  2. F/u with PCP, card, and neph  3. Continue coumadin per Dr Tate's direction  4. F/u with Dr Torrez  5. RTC in 6 months  Fax note to Familia Ramirez Jr., *, Bebeto; Shen Torrez    Discussion:     I have explained all of the above in detail and the patient understands all of the current recommendation(s). I have answered all of their questions to the best of my ability and to their complete satisfaction.   The patient is to continue with the current management plan.            Electronically signed by Kai Ortiz MD

## 2018-10-03 NOTE — LETTER
October 3, 2018      Familia Ramirez Jr., MD  [address]  [place] [address]           [facility] - Hematology Oncology  [address]  [address]  [place] [address]  Phone: [phone]  Fax: [phone]          Patient: Richard Bustos   MR Number: [id number]   YOB: 1945   Date of Visit: 10/3/2018       Dear Dr. Familia Ramirez Jr.:    Thank you for referring Richard Bustos to me for evaluation. Attached you will find relevant portions of my assessment and plan of care.    If you have questions, please do not hesitate to call me. I look forward to following Richard Bustos along with you.    Sincerely,    Kai Ortiz MD    Enclosure  CC:  No Recipients    If you would like to receive this communication electronically, please contact [email or web address] or [phone] to request more information on [employer] access.    For providers and/or their staff who would like to refer a patient to [facility], please contact us through our one-stop-shop provider referral line, [facility], at [phone].    If you feel you have received this communication in error or would no longer like to receive these types of communications, please e-mail [email or web address]

## 2018-10-04 ENCOUNTER — OFFICE VISIT (OUTPATIENT)
Dept: PODIATRY | Facility: CLINIC | Age: 73
End: 2018-10-04
Payer: MEDICARE

## 2018-10-04 VITALS
DIASTOLIC BLOOD PRESSURE: 78 MMHG | HEIGHT: 69 IN | WEIGHT: 289.69 LBS | SYSTOLIC BLOOD PRESSURE: 138 MMHG | BODY MASS INDEX: 42.91 KG/M2 | HEART RATE: 55 BPM

## 2018-10-04 DIAGNOSIS — E11.51 TYPE II DIABETES MELLITUS WITH PERIPHERAL CIRCULATORY DISORDER: ICD-10-CM

## 2018-10-04 DIAGNOSIS — L97.512 RIGHT FOOT ULCER, WITH FAT LAYER EXPOSED: Primary | ICD-10-CM

## 2018-10-04 DIAGNOSIS — E11.42 DIABETIC POLYNEUROPATHY ASSOCIATED WITH TYPE 2 DIABETES MELLITUS: ICD-10-CM

## 2018-10-04 PROCEDURE — 99213 OFFICE O/P EST LOW 20 MIN: CPT | Mod: PBBFAC,PO,25 | Performed by: PODIATRIST

## 2018-10-04 PROCEDURE — 11042 DBRDMT SUBQ TIS 1ST 20SQCM/<: CPT | Mod: PBBFAC,PO,RT | Performed by: PODIATRIST

## 2018-10-04 PROCEDURE — 99214 OFFICE O/P EST MOD 30 MIN: CPT | Mod: 25,S$GLB,, | Performed by: PODIATRIST

## 2018-10-04 PROCEDURE — 99999 PR PBB SHADOW E&M-EST. PATIENT-LVL III: CPT | Mod: PBBFAC,,, | Performed by: PODIATRIST

## 2018-10-04 NOTE — PROCEDURES
"Wound Debridement  Date/Time: 10/4/2018 9:43 AM  Performed by: Delta Sun DPM  Authorized by: Delta Sun DPM     Time out: Immediately prior to procedure a "time out" was called to verify the correct patient, procedure, equipment, support staff and site/side marked as required.    Consent Done?:  Yes (Verbal)  Local anesthesia used?: No      Wound Details:    Location:  Right foot    Location:  Right 1st Metatarsal Head    Type of Debridement:  Excisional       Length (cm):  0.7       Area (sq cm):  0.42       Width (cm):  0.6       Percent Debrided (%):  100       Depth (cm):  0.2       Total Area Debrided (sq cm):  0.42    Depth of debridement:  Subcutaneous tissue    Tissue debrided:  Dermis    Devitalized tissue debrided:  Callus, Fibrin and Sough    Instruments:  Curette    Bleeding:  Minimal  Hemostasis Achieved: Yes    Method Used:  Pressure  Patient tolerance:  Patient tolerated the procedure well with no immediate complications        "

## 2018-10-04 NOTE — PROGRESS NOTES
Subjective:      Patient ID: Richard Bustos is a 73 y.o. male.    Chief Complaint: Foot Ulcer (right and left foot  x 1week   PCP  Familia Ramirez  5/12/18) and Diabetes Mellitus (A!C  6.7  9/19/17)    Richard is a 73 y.o. male who presents to the clinic for evaluation and treatment of high risk feet. Richard has a past medical history of Anemia, Anemia of other chronic disease (9/13/2017), Anemia, chronic renal failure (9/13/2017), Anemia, unspecified (9/13/2017), Anticoagulant long-term use, Aorta aneurysm, Arthritis, Atrial fibrillation, CAD (coronary artery disease), CHF (congestive heart failure), Chronic kidney disease, Clotting disorder (9/13/2017), Colon polyp, Diabetes mellitus, Diabetes mellitus type II, Diverticulosis, Elevated PSA, Encounter for blood transfusion, Former smoker, GERD (gastroesophageal reflux disease), Gout, colonic polyps, Hypercoagulable state, Hyperlipidemia, Hypertension, MI, old (2010), Myocardial infarction, Osteomyelitis of ankle or foot (3/9/2017), Prostate cancer (06/2016), Sleep apnea, Squamous cell carcinoma (01/23/2018), and Venous stasis. The patient's chief complaint is foot ulcer, right foot, he has had break down to this area before, normally seen by Dr. Torrez. Denies f/c/n/v. This patient has documented high risk feet requiring routine maintenance secondary to diabetes mellitis and those secondary complications of diabetes, as mentioned.    PCP: Familia Ramirez Jr, MD    Date Last Seen by PCP: 5/12/18    Current shoe gear:  Affected Foot: Extra depth shoes     Unaffected Foot: Extra depth shoes    History of Trauma: negative    Hemoglobin A1C   Date Value Ref Range Status   10/07/2018 6.0 (H) 4.0 - 5.6 % Final     Comment:     ADA Screening Guidelines:  5.7-6.4%  Consistent with prediabetes  >or=6.5%  Consistent with diabetes  High levels of fetal hemoglobin interfere with the HbA1C  assay. Heterozygous hemoglobin variants (HbS, HgC, etc)do  not significantly interfere with this  assay.   However, presence of multiple variants may affect accuracy.     07/19/2018 6.7 (H) 4.0 - 5.6 % Final     Comment:     ADA Screening Guidelines:  5.7-6.4%  Consistent with prediabetes  >or=6.5%  Consistent with diabetes  High levels of fetal hemoglobin interfere with the HbA1C  assay. Heterozygous hemoglobin variants (HbS, HgC, etc)do  not significantly interfere with this assay.   However, presence of multiple variants may affect accuracy.     05/12/2018 6.3 (H) 4.0 - 5.6 % Final     Comment:     According to ADA guidelines, hemoglobin A1c <7.0% represents  optimal control in non-pregnant diabetic patients. Different  metrics may apply to specific patient populations.   Standards of Medical Care in Diabetes-2016.  For the purpose of screening for the presence of diabetes:  <5.7%     Consistent with the absence of diabetes  5.7-6.4%  Consistent with increasing risk for diabetes   (prediabetes)  >or=6.5%  Consistent with diabetes  Currently, no consensus exists for use of hemoglobin A1c  for diagnosis of diabetes for children.  This Hemoglobin A1c assay has significant interference with fetal   hemoglobin   (HbF). The results are invalid for patients with abnormal amounts of   HbF,   including those with known Hereditary Persistence   of Fetal Hemoglobin. Heterozygous hemoglobin variants (HbAS, HbAC,   HbAD, HbAE, HbA2) do not significantly interfere with this assay;   however, presence of multiple variants in a sample may impact the %   interference.     06/17/2013 6.8 (H) 4.8 - 5.6 % Final     Comment:              Increased risk for diabetes: 5.7 - 6.4           Diabetes: >6.4           Glycemic control for adults with diabetes: <7.0  **In order to standardize %HbA1c results worldwide, as of October 11, 2010,  the %HbA1c is being calculated using the master equation recommended in the  consensus statement adopted by the ADA (American Diabetes Assoc), EASD  (European Assoc for the Study of Diabetes), IFCC  (International Federation  of Clinical Chemistry and Laboratory Medicine) and IDF (International  Diabetes Federation). Result units: %HgbA1c (DCCT/NGSP).  In common with other methods, Hb A1C values may not accurately reflect mean  blood glucose in patients with hemoglobin variants (HgbF, HgbS and HgbC).  Any cause of shortened erythrocyte survival will reduce exposure of  erythrocytes to glucose with a consequent decrease in HbA1c (%) values, even  though the time-averaged blood glucose level may be elevated. Causes of  shortened erythrocyte lifetime might be hemolytic anemia or other hemolytic  diseases, homozygous sickle cell trait, pregnancy, recent significant or  chronic blood loss, etc. Caution should be used when interpreting the HbA1c  results from patients with these conditions.       Review of Systems   Constitution: Negative for chills and fever.   Cardiovascular: Positive for leg swelling. Negative for claudication.   Respiratory: Negative for shortness of breath.    Skin: Positive for nail changes. Negative for itching and rash.   Musculoskeletal: Positive for arthritis. Negative for muscle cramps, muscle weakness and myalgias.   Gastrointestinal: Negative for nausea and vomiting.   Neurological: Positive for numbness and paresthesias. Negative for focal weakness and loss of balance.           Objective:      Physical Exam   Constitutional: He is oriented to person, place, and time. He appears well-developed and well-nourished. No distress.   Cardiovascular:   Pulses:       Dorsalis pedis pulses are 2+ on the right side, and 2+ on the left side.        Posterior tibial pulses are 2+ on the right side, and 2+ on the left side.   < 3 sec capillary refill time to toes 1-5 bilateral. Toes and feet are warm to touch proximally with distal cooling b/l. There is no hair growth on the feet and toes b/l. There is mild edema b/l. No spider veins or varicosities present b/l.      Musculoskeletal:   Equinus noted  b/l ankles with < 10 deg DF noted. MMT 5/5 in DF/PF/Inv/Ev resistance with no reproduction of pain in any direction. Passive range of motion of ankle and pedal joints is painless b/l.     Neurological: He is alert and oriented to person, place, and time. He has normal strength. He displays no atrophy and no tremor. A sensory deficit is present. He exhibits normal muscle tone.   Negative tinel sign bilateral. Decreased vibratory and protective sensation bilateral.   Skin: Skin is warm, dry and intact. No abrasion, no bruising, no burn, no ecchymosis, no laceration, no lesion, no petechiae and no rash noted. He is not diaphoretic. No cyanosis or erythema. No pallor. Nails show no clubbing.   Skin temperature, texture and turgor within normal limits.    Ulcer Location: right sub first metatarsal head  Measurements: Pre: callused over, post: 0.7x0.6x0.2 cm  Periwound: Intact  Drainage: serosanguinous.  Pus: None.  Malodor: None.  Base:  100% granular. 0% fibrin.  Signs of infection: None.     Psychiatric: He has a normal mood and affect. His behavior is normal.             Assessment:       Encounter Diagnoses   Name Primary?    Right foot ulcer, with fat layer exposed Yes    Diabetic polyneuropathy associated with type 2 diabetes mellitus     Type II diabetes mellitus with peripheral circulatory disorder          Plan:       Richard was seen today for foot ulcer and diabetes mellitus.    Diagnoses and all orders for this visit:    Right foot ulcer, with fat layer exposed  -     Wound Debridement    Diabetic polyneuropathy associated with type 2 diabetes mellitus    Type II diabetes mellitus with peripheral circulatory disorder      I counseled the patient on his conditions, their implications and medical management.    Shoe inspection. Diabetic Foot Education. Patient reminded of the importance of good nutrition and blood sugar control to help prevent podiatric complications of diabetes. Patient instructed on proper  foot hygeine. We discussed wearing proper shoe gear, daily foot inspections, never walking without protective shoe gear, never putting sharp instruments to feet    Wound debrided, see attached note    Dressed with nicole, aperture pad and football dressing, this will remain intact until next visit.    Follow up with Dr. Torrez in 1 week for continued wound care    Delta Sun DPM

## 2018-10-07 ENCOUNTER — HOSPITAL ENCOUNTER (OUTPATIENT)
Facility: HOSPITAL | Age: 73
Discharge: HOME HEALTH | End: 2018-10-07
Attending: EMERGENCY MEDICINE | Admitting: HOSPITALIST
Payer: MEDICARE

## 2018-10-07 VITALS
DIASTOLIC BLOOD PRESSURE: 64 MMHG | OXYGEN SATURATION: 94 % | SYSTOLIC BLOOD PRESSURE: 117 MMHG | WEIGHT: 291.69 LBS | RESPIRATION RATE: 18 BRPM | TEMPERATURE: 97 F | HEART RATE: 62 BPM | BODY MASS INDEX: 43.2 KG/M2 | HEIGHT: 69 IN

## 2018-10-07 DIAGNOSIS — R41.82 ALTERED MENTAL STATE: ICD-10-CM

## 2018-10-07 DIAGNOSIS — R50.9 FEVER, UNSPECIFIED FEVER CAUSE: Primary | ICD-10-CM

## 2018-10-07 PROBLEM — I95.9 HYPOTENSION: Status: ACTIVE | Noted: 2018-10-07

## 2018-10-07 PROBLEM — G93.41 METABOLIC ENCEPHALOPATHY: Status: ACTIVE | Noted: 2018-10-07

## 2018-10-07 LAB
ABO + RH BLD: NORMAL
ALBUMIN SERPL BCP-MCNC: 2.5 G/DL
ALP SERPL-CCNC: 67 U/L
ALT SERPL W/O P-5'-P-CCNC: 13 U/L
ANION GAP SERPL CALC-SCNC: 8 MMOL/L
AST SERPL-CCNC: 18 U/L
BASOPHILS # BLD AUTO: 0 K/UL
BASOPHILS NFR BLD: 0.3 %
BILIRUB SERPL-MCNC: 0.4 MG/DL
BLD GP AB SCN CELLS X3 SERPL QL: NORMAL
BUN SERPL-MCNC: 30 MG/DL
CALCIUM SERPL-MCNC: 8.7 MG/DL
CHLORIDE SERPL-SCNC: 105 MMOL/L
CO2 SERPL-SCNC: 26 MMOL/L
CREAT SERPL-MCNC: 1.7 MG/DL
DIFFERENTIAL METHOD: ABNORMAL
EOSINOPHIL # BLD AUTO: 0 K/UL
EOSINOPHIL NFR BLD: 0.1 %
ERYTHROCYTE [DISTWIDTH] IN BLOOD BY AUTOMATED COUNT: 14.3 %
EST. GFR  (AFRICAN AMERICAN): 46 ML/MIN/1.73 M^2
EST. GFR  (NON AFRICAN AMERICAN): 40 ML/MIN/1.73 M^2
ESTIMATED AVG GLUCOSE: 126 MG/DL
GLUCOSE SERPL-MCNC: 139 MG/DL
HBA1C MFR BLD HPLC: 6 %
HCT VFR BLD AUTO: 30.6 %
HGB BLD-MCNC: 10.2 G/DL
LYMPHOCYTES # BLD AUTO: 0.4 K/UL
LYMPHOCYTES NFR BLD: 5.1 %
MAGNESIUM SERPL-MCNC: 1.7 MG/DL
MCH RBC QN AUTO: 33.2 PG
MCHC RBC AUTO-ENTMCNC: 33.3 G/DL
MCV RBC AUTO: 100 FL
MONOCYTES # BLD AUTO: 0.3 K/UL
MONOCYTES NFR BLD: 4.3 %
NEUTROPHILS # BLD AUTO: 7.1 K/UL
NEUTROPHILS NFR BLD: 90.2 %
PHOSPHATE SERPL-MCNC: 3.3 MG/DL
PLATELET # BLD AUTO: 169 K/UL
PMV BLD AUTO: 8 FL
POCT GLUCOSE: 131 MG/DL (ref 70–110)
POTASSIUM SERPL-SCNC: 4.4 MMOL/L
PROCALCITONIN SERPL IA-MCNC: 0.24 NG/ML
PROT SERPL-MCNC: 5.8 G/DL
RBC # BLD AUTO: 3.08 M/UL
SODIUM SERPL-SCNC: 139 MMOL/L
WBC # BLD AUTO: 7.8 K/UL

## 2018-10-07 PROCEDURE — 36415 COLL VENOUS BLD VENIPUNCTURE: CPT

## 2018-10-07 PROCEDURE — G8979 MOBILITY GOAL STATUS: HCPCS | Mod: CJ

## 2018-10-07 PROCEDURE — 97116 GAIT TRAINING THERAPY: CPT

## 2018-10-07 PROCEDURE — 85025 COMPLETE CBC W/AUTO DIFF WBC: CPT

## 2018-10-07 PROCEDURE — 99900035 HC TECH TIME PER 15 MIN (STAT)

## 2018-10-07 PROCEDURE — 86850 RBC ANTIBODY SCREEN: CPT

## 2018-10-07 PROCEDURE — 99285 EMERGENCY DEPT VISIT HI MDM: CPT | Mod: 25

## 2018-10-07 PROCEDURE — 80053 COMPREHEN METABOLIC PANEL: CPT

## 2018-10-07 PROCEDURE — 99214 OFFICE O/P EST MOD 30 MIN: CPT | Mod: ,,, | Performed by: INTERNAL MEDICINE

## 2018-10-07 PROCEDURE — 84145 PROCALCITONIN (PCT): CPT

## 2018-10-07 PROCEDURE — 83735 ASSAY OF MAGNESIUM: CPT

## 2018-10-07 PROCEDURE — 27000221 HC OXYGEN, UP TO 24 HOURS

## 2018-10-07 PROCEDURE — G8978 MOBILITY CURRENT STATUS: HCPCS | Mod: CK

## 2018-10-07 PROCEDURE — 25000003 PHARM REV CODE 250: Performed by: NURSE PRACTITIONER

## 2018-10-07 PROCEDURE — 83036 HEMOGLOBIN GLYCOSYLATED A1C: CPT

## 2018-10-07 PROCEDURE — G0378 HOSPITAL OBSERVATION PER HR: HCPCS

## 2018-10-07 PROCEDURE — 97162 PT EVAL MOD COMPLEX 30 MIN: CPT

## 2018-10-07 PROCEDURE — 96360 HYDRATION IV INFUSION INIT: CPT

## 2018-10-07 PROCEDURE — 25000003 PHARM REV CODE 250: Performed by: EMERGENCY MEDICINE

## 2018-10-07 PROCEDURE — 84100 ASSAY OF PHOSPHORUS: CPT

## 2018-10-07 PROCEDURE — 63600175 PHARM REV CODE 636 W HCPCS: Performed by: NURSE PRACTITIONER

## 2018-10-07 PROCEDURE — 94761 N-INVAS EAR/PLS OXIMETRY MLT: CPT

## 2018-10-07 RX ORDER — SODIUM CHLORIDE 0.9 % (FLUSH) 0.9 %
5 SYRINGE (ML) INJECTION
Status: DISCONTINUED | OUTPATIENT
Start: 2018-10-07 | End: 2018-10-07 | Stop reason: HOSPADM

## 2018-10-07 RX ORDER — CEFUROXIME AXETIL 250 MG/1
250 TABLET ORAL 2 TIMES DAILY
Qty: 14 TABLET | Refills: 0 | Status: SHIPPED | OUTPATIENT
Start: 2018-10-07 | End: 2018-12-05

## 2018-10-07 RX ORDER — ONDANSETRON 2 MG/ML
8 INJECTION INTRAMUSCULAR; INTRAVENOUS EVERY 8 HOURS PRN
Status: DISCONTINUED | OUTPATIENT
Start: 2018-10-07 | End: 2018-10-07 | Stop reason: HOSPADM

## 2018-10-07 RX ORDER — IPRATROPIUM BROMIDE AND ALBUTEROL SULFATE 2.5; .5 MG/3ML; MG/3ML
3 SOLUTION RESPIRATORY (INHALATION) EVERY 4 HOURS PRN
Status: DISCONTINUED | OUTPATIENT
Start: 2018-10-07 | End: 2018-10-07 | Stop reason: HOSPADM

## 2018-10-07 RX ORDER — POTASSIUM CHLORIDE 20 MEQ/15ML
60 SOLUTION ORAL
Status: DISCONTINUED | OUTPATIENT
Start: 2018-10-07 | End: 2018-10-07

## 2018-10-07 RX ORDER — MEMANTINE HYDROCHLORIDE 5 MG/1
5 TABLET ORAL 2 TIMES DAILY
Status: DISCONTINUED | OUTPATIENT
Start: 2018-10-07 | End: 2018-10-07 | Stop reason: HOSPADM

## 2018-10-07 RX ORDER — ATORVASTATIN CALCIUM 40 MG/1
80 TABLET, FILM COATED ORAL DAILY
Status: DISCONTINUED | OUTPATIENT
Start: 2018-10-07 | End: 2018-10-07 | Stop reason: HOSPADM

## 2018-10-07 RX ORDER — FLUTICASONE PROPIONATE 50 MCG
2 SPRAY, SUSPENSION (ML) NASAL DAILY
Status: DISCONTINUED | OUTPATIENT
Start: 2018-10-07 | End: 2018-10-07 | Stop reason: HOSPADM

## 2018-10-07 RX ORDER — ALLOPURINOL 100 MG/1
100 TABLET ORAL DAILY
Status: DISCONTINUED | OUTPATIENT
Start: 2018-10-07 | End: 2018-10-07 | Stop reason: HOSPADM

## 2018-10-07 RX ORDER — PANTOPRAZOLE SODIUM 40 MG/10ML
40 INJECTION, POWDER, LYOPHILIZED, FOR SOLUTION INTRAVENOUS 2 TIMES DAILY
Status: DISCONTINUED | OUTPATIENT
Start: 2018-10-07 | End: 2018-10-07 | Stop reason: HOSPADM

## 2018-10-07 RX ORDER — ACETAMINOPHEN 325 MG/1
650 TABLET ORAL EVERY 6 HOURS PRN
Status: DISCONTINUED | OUTPATIENT
Start: 2018-10-07 | End: 2018-10-07 | Stop reason: HOSPADM

## 2018-10-07 RX ORDER — LANOLIN ALCOHOL/MO/W.PET/CERES
800 CREAM (GRAM) TOPICAL
Status: DISCONTINUED | OUTPATIENT
Start: 2018-10-07 | End: 2018-10-07 | Stop reason: HOSPADM

## 2018-10-07 RX ORDER — IBUPROFEN 200 MG
24 TABLET ORAL
Status: DISCONTINUED | OUTPATIENT
Start: 2018-10-07 | End: 2018-10-07 | Stop reason: HOSPADM

## 2018-10-07 RX ORDER — RAMELTEON 8 MG/1
8 TABLET ORAL NIGHTLY PRN
Status: DISCONTINUED | OUTPATIENT
Start: 2018-10-07 | End: 2018-10-07

## 2018-10-07 RX ORDER — FERROUS SULFATE 325(65) MG
325 TABLET, DELAYED RELEASE (ENTERIC COATED) ORAL DAILY
Status: DISCONTINUED | OUTPATIENT
Start: 2018-10-07 | End: 2018-10-07 | Stop reason: HOSPADM

## 2018-10-07 RX ORDER — INSULIN ASPART 100 [IU]/ML
0-5 INJECTION, SOLUTION INTRAVENOUS; SUBCUTANEOUS
Status: DISCONTINUED | OUTPATIENT
Start: 2018-10-07 | End: 2018-10-07 | Stop reason: HOSPADM

## 2018-10-07 RX ORDER — POTASSIUM CHLORIDE 20 MEQ/15ML
40 SOLUTION ORAL
Status: DISCONTINUED | OUTPATIENT
Start: 2018-10-07 | End: 2018-10-07

## 2018-10-07 RX ORDER — ESCITALOPRAM OXALATE 10 MG/1
10 TABLET ORAL DAILY
Status: DISCONTINUED | OUTPATIENT
Start: 2018-10-07 | End: 2018-10-07 | Stop reason: HOSPADM

## 2018-10-07 RX ORDER — GLUCAGON 1 MG
1 KIT INJECTION
Status: DISCONTINUED | OUTPATIENT
Start: 2018-10-07 | End: 2018-10-07 | Stop reason: HOSPADM

## 2018-10-07 RX ORDER — ACETAMINOPHEN 500 MG
1000 TABLET ORAL EVERY 6 HOURS PRN
Status: DISCONTINUED | OUTPATIENT
Start: 2018-10-07 | End: 2018-10-07

## 2018-10-07 RX ORDER — CALCITRIOL 0.25 UG/1
0.5 CAPSULE ORAL DAILY
Status: DISCONTINUED | OUTPATIENT
Start: 2018-10-07 | End: 2018-10-07 | Stop reason: HOSPADM

## 2018-10-07 RX ORDER — IBUPROFEN 200 MG
16 TABLET ORAL
Status: DISCONTINUED | OUTPATIENT
Start: 2018-10-07 | End: 2018-10-07 | Stop reason: HOSPADM

## 2018-10-07 RX ADMIN — CEFTRIAXONE SODIUM 2 G: 2 INJECTION, POWDER, FOR SOLUTION INTRAMUSCULAR; INTRAVENOUS at 11:10

## 2018-10-07 RX ADMIN — AZITHROMYCIN MONOHYDRATE 500 MG: 500 INJECTION, POWDER, LYOPHILIZED, FOR SOLUTION INTRAVENOUS at 04:10

## 2018-10-07 RX ADMIN — SODIUM CHLORIDE 250 ML: 0.9 INJECTION, SOLUTION INTRAVENOUS at 02:10

## 2018-10-07 NOTE — SUBJECTIVE & OBJECTIVE
Past Medical History:   Diagnosis Date    Anemia     Anemia of other chronic disease 9/13/2017    Anemia, chronic renal failure 9/13/2017    Anemia, unspecified 9/13/2017    Anticoagulant long-term use     Aorta aneurysm     Arthritis     Atrial fibrillation     CAD (coronary artery disease)     CHF (congestive heart failure)     Chronic kidney disease     Clotting disorder 9/13/2017    Colon polyp     Diabetes mellitus     not on meds    Diabetes mellitus type II     Diverticulosis     Elevated PSA     Encounter for blood transfusion     Former smoker     GERD (gastroesophageal reflux disease)     Gout     Hx of colonic polyps     Hypercoagulable state     Hyperlipidemia     Hypertension     MI, old 2010    Myocardial infarction     9/2010    Osteomyelitis of ankle or foot 3/9/2017    Prostate cancer 06/2016    Sleep apnea     Squamous cell carcinoma 01/23/2018    Left helix, imiquimod    Venous stasis     Chronic       Past Surgical History:   Procedure Laterality Date    ABDOMINAL SURGERY      BLOCK-NERVE-MEDIAL BRANCH-LUMBAR Bilateral 7/13/2017    [facility]y [facility] at [facility] OR    BLOCK-NERVE-MEDIAL BRANCH-LUMBAR Bilateral 6/22/2017    [facility]formed by [facility] at [facility] OR    CARDIAC SURGERY      COLONOSCOPY  02/2011    diverticulosis with diverticula with clot, no active bleeding    COLONOSCOPY N/A 8/24/2016    Procedure: COLONOSCOPY;  Surgeon: Saroj Borjas MD;  Location: [facility];  Service: Endoscopy;  Laterality: N/A;    COLONOSCOPY N/A 8/24/2016    [facility]O    CYSTOSCOPY N/A 5/23/2016    Performed by Adeline Moss MD at [facility] OR    GASTRIC BYPASS  2/5/2008    IVC FILTER RETRIEVAL      JOINT REPLACEMENT  1996 and 2001    bi-lat hip replacement/Rt Hip and Lt Hip    RADIOFREQUENCY THERMOCOAGULATION (RFTC)-NERVE-MEDIAN BRANCH-LUMBAR Bilateral 8/3/2017    Performed by Nile [facility]y MD at [facility] OR    ROTATOR  CUFF REPAIR      Rt shoulder    Stents  8/18/2010    x 3    TRANSRECTAL ULTRASOUND GUIDED PROSTATE BIOPSY N/A 8/1/2016    [facility]    TRANSRECTAL ULTRASOUND GUIDED PROSTATE BIOPSY N/A 5/23/2016    [facility] by [facility]eline Moss [facility] at [facility] OR    UPPER GASTROINTESTINAL ENDOSCOPY  02/2011       Review of patient's allergies indicates:   Allergen Reactions    Shellfish containing products Other (See Comments)     Other reaction(s): Gout       Current Facility-Administered Medications on File Prior to Encounter   Medication    [COMPLETED] 0.9%  NaCl infusion    [COMPLETED] 0.9%  NaCl infusion    [COMPLETED] acetaminophen tablet 1,000 mg    [COMPLETED] acyclovir (ZOVIRAX) 710 mg in dextrose 5 % 250 mL IVPB    [COMPLETED] cefTRIAXone (ROCEPHIN) 2 g in dextrose 5 % 50 mL IVPB    [COMPLETED] lidocaine HCL 20 mg/ml (2%) 20 mg/mL (2 %) injection    [COMPLETED] pantoprazole injection 40 mg     Current Outpatient Medications on File Prior to Encounter   Medication Sig    alendronate-vitamin D3 (FOSAMAX PLUS D) 70 mg- 2,800 unit per tablet Take 1 tablet by mouth every 7 days.    allopurinol (ZYLOPRIM) 100 MG tablet TAKE 1 TABLET(100 MG) BY MOUTH EVERY DAY    ALPRAZolam (XANAX) 1 MG tablet TAKE 1 TABLET(1 MG) BY MOUTH EVERY EVENING    aspirin (ECOTRIN) 81 MG EC tablet Take 81 mg by mouth once daily.    atorvastatin (LIPITOR) 80 MG tablet TAKE 1 TABLET(80 MG) BY MOUTH EVERY DAY    calcitRIOL (ROCALTROL) 0.5 MCG Cap TK 1 C PO D    calcium carbonate (TUMS ULTRA) 400 mg calcium (1,000 mg) Chew Take 1 tablet (400 mg total) by mouth once daily.    carvedilol (COREG) 25 MG tablet TAKE 1 TABLET BY MOUTH TWICE DAILY WITH MEALS    carvedilol (COREG) 25 MG tablet TAKE 1 TABLET BY MOUTH TWICE DAILY WITH MEALS    ergocalciferol (ERGOCALCIFEROL) 50,000 unit Cap TK 1 C PO Q WEEK    escitalopram oxalate (LEXAPRO) 10 MG tablet TAKE 1/2 DAILY FOR FIRST 7 DAYS THEN THEREAFTER  TAKE 1 TABLET BY MOUTH DAILY    ferrous sulfate 325 mg (65 mg iron) Tab tablet TAKE 1 TABLET BY MOUTH TWICE DAILY    fluticasone (FLONASE) 50 mcg/actuation nasal spray 2 sprays by Each Nare route once daily.    FOLIC ACID/MULTIVIT-MIN/LUTEIN (CENTRUM SILVER ORAL) Take 1 tablet by mouth once daily.    furosemide (LASIX) 40 MG tablet TAKE 1 TABLET BY MOUTH DAILY AS NEEDED    HYDROcodone-acetaminophen (NORCO) 7.5-325 mg per tablet Take 1 tablet by mouth every 6 (six) hours as needed for Pain.    hydrocortisone 2.5 % cream Apply topically 2 (two) times daily. for 10 days    influenza (FLUZONE HIGH-DOSE) 180 mcg/0.5 mL vaccine Inject into the muscle.    ipratropium (ATROVENT) 0.02 % nebulizer solution Take 2.5 mLs (500 mcg total) by nebulization 4 (four) times daily. Rescue    L-METHYL-B6-B12 3-35-2 mg Tab TAKE 1 TABLET BY MOUTH TWICE DAILY    lisinopril (PRINIVIL,ZESTRIL) 40 MG tablet TAKE 1 TABLET BY MOUTH EVERY EVENING    MAGOX 400 mg tablet TAKE 1 TABLET BY MOUTH EVERY DAY    memantine (NAMENDA) 5 MG Tab Take 1 tablet (5 mg total) by mouth 2 (two) times daily.    mirabegron (MYRBETRIQ) 50 mg Tb24 Take 1 tablet (50 mg total) by mouth once daily.    nitroGLYCERIN 0.4 MG/DOSE TL SPRY (NITROLINGUAL) 400 mcg/spray spray PLACE 1 SPRAY UNDER THE TONGUE EVERY 5 MINUTES AS NEEDED FOR CHEST PAIN    omeprazole (PRILOSEC) 20 MG capsule TAKE 1 CAPSULE(20 MG) BY MOUTH EVERY DAY    ranitidine (ZANTAC) 150 MG tablet TAKE 1 TABLET(150 MG) BY MOUTH TWICE DAILY    triamcinolone acetonide 0.1% (KENALOG) 0.1 % cream AAA bid    vit C-vit E-lutein-min-om-3 (OCUVITE) 321-97-8-150 mg-unit-mg-mg Cap Take 1 capsule by mouth once daily.    warfarin (COUMADIN) 5 MG tablet TAKE 1 TABLET BY MOUTH EVERY DAY (Patient taking differently: TAKE 1 TABLET BY MOUTH EVERY DAY EXCEPT 1 1/2 TABLETS ON WEDNESDAY)     Family History     Problem Relation (Age of Onset)    Heart attack Mother, Father, Brother    Lupus Daughter    Ulcerative  colitis Daughter (35)        Tobacco Use    Smoking status: Former Smoker    Smokeless tobacco: Never Used    Tobacco comment: 45 yrs ago, only smoked 3-4 packs in his entire life as a teenager   Substance and Sexual Activity    Alcohol use: Yes     Comment: rare    Drug use: No    Sexual activity: Not on file     Review of Systems   Constitutional: Positive for diaphoresis and fever. Negative for activity change, appetite change, chills and fatigue.   HENT: Positive for congestion and postnasal drip. Negative for hearing loss, sore throat and trouble swallowing.    Eyes: Negative for photophobia and visual disturbance.   Respiratory: Positive for cough. Negative for shortness of breath and wheezing.    Cardiovascular: Negative for chest pain, palpitations and leg swelling.   Gastrointestinal: Negative for abdominal pain, diarrhea, nausea and vomiting.   Endocrine: Negative for polydipsia, polyphagia and polyuria.   Genitourinary: Negative for difficulty urinating, dysuria, frequency and urgency.   Musculoskeletal: Positive for back pain (Chronic). Negative for neck pain and neck stiffness.   Skin: Negative for rash and wound.   Neurological: Negative for dizziness, syncope, speech difficulty, weakness, numbness and headaches.   Psychiatric/Behavioral: Positive for confusion (Per family). The patient is not nervous/anxious.      Objective:     Vital Signs (Most Recent):  Temp: 98.7 °F (37.1 °C) (10/07/18 0205)  Pulse: (!) 54 (10/07/18 0232)  Resp: 18 (10/07/18 0205)  BP: (!) 92/51 (10/07/18 0232)  SpO2: 98 % (10/07/18 0232) Vital Signs (24h Range):  Temp:  [98.1 °F (36.7 °C)-100.4 °F (38 °C)] 98.7 °F (37.1 °C)  Pulse:  [53-87] 54  Resp:  [12-34] 18  SpO2:  [92 %-98 %] 98 %  BP: ()/(36-67) 92/51     Weight: 131.5 kg (290 lb)  Body mass index is 42.83 kg/m².    Physical Exam   Constitutional: He is oriented to person, place, and time. He appears well-developed and well-nourished. No distress.   Obese.    HENT:   Head: Normocephalic and atraumatic.   Mouth/Throat: Oropharynx is clear and moist.   Eyes: Conjunctivae and EOM are normal. Pupils are equal, round, and reactive to light. No scleral icterus.   Neck: Normal range of motion. Neck supple. No JVD present. No tracheal deviation present. No thyromegaly present.   Cardiovascular: Regular rhythm, normal heart sounds and intact distal pulses. Bradycardia present. Exam reveals no gallop and no friction rub.   No murmur heard.  Pulses:       Dorsalis pedis pulses are 2+ on the right side, and 2+ on the left side.   Pulmonary/Chest: Effort normal and breath sounds normal. No accessory muscle usage. No respiratory distress. He has no wheezes. He has no rhonchi. He has no rales.   Abdominal: Soft. Bowel sounds are normal. He exhibits mass (Left lower quadrant ). He exhibits no distension. There is no tenderness. There is no guarding.   Musculoskeletal: Normal range of motion. He exhibits no edema, tenderness or deformity.   BARNEY to command.   Neurological: He is alert and oriented to person, place, and time. He has normal strength. He exhibits normal muscle tone. Coordination normal. GCS eye subscore is 4. GCS verbal subscore is 5. GCS motor subscore is 6.   Skin: Skin is warm, dry and intact. Capillary refill takes less than 2 seconds. No rash noted. He is not diaphoretic. No erythema.   Psychiatric: He has a normal mood and affect. His speech is normal and behavior is normal. Judgment and thought content normal.   Vitals reviewed.        CRANIAL NERVES     CN III, IV, VI   Pupils are equal, round, and reactive to light.  Extraocular motions are normal.        Significant Labs:   CBC:   Recent Labs   Lab  10/06/18   2049  10/06/18   2139  10/07/18   0007   WBC  11.70   --    --    HGB  10.8*   --    --    HCT  31.8*  30*  28.7*   PLT  180   --    --      CMP:   Recent Labs   Lab  10/06/18   2049   NA  132*   K  4.0   CL  102   CO2  25   GLU  169*   BUN  27*    CREATININE  1.5*   CALCIUM  8.4*   PROT  6.6   ALBUMIN  2.9*   BILITOT  0.6   ALKPHOS  58   AST  23   ALT  13*   ANIONGAP  5*   EGFRNONAA  46.2*     Cardiac Markers:   Recent Labs   Lab  10/06/18   2049   BNP  419*     Coagulation:   Recent Labs   Lab  10/06/18   2049   INR  1.8*     Lactic Acid:   Recent Labs   Lab  10/06/18   2049   LACTATE  0.9     Troponin:   Recent Labs   Lab  10/06/18   2049   TROPONINI  0.03     Urine Studies:   Recent Labs   Lab  10/06/18   2108   COLORU  Yellow   APPEARANCEUA  Clear   PHUR  6.0   SPECGRAV  1.025   PROTEINUA  2+*   GLUCUA  Negative   KETONESU  Negative   BILIRUBINUA  Negative   OCCULTUA  Trace*   NITRITE  Negative   UROBILINOGEN  Negative   LEUKOCYTESUR  Negative   RBCUA  3   WBCUA  0   BACTERIA  Few*   HYALINECASTS  0       Significant Imaging:    CXR: my read: no opacities, no infiltrates.    CT abdomen: Negative per [facility]

## 2018-10-07 NOTE — DISCHARGE SUMMARY
[facility] Hospital Medicine  Discharge Summary      Patient Name: Richard Bustos  MRN: [id number]  Admission Date: 10/7/2018  Hospital Length of Stay: 0 days  Discharge Date and Time: No discharge date for patient encounter.  Attending Physician: Eloise Terry MD   Discharging Provider: Eloise Terry MD  Primary Care Provider: Familia Ramirez Jr, MD      HPI:   Richard Bustos is a 72 yo male with PMHx significant for DM, Afib, HTN, CAD, MI, CHF, CKD, HLD, Hypercoaguable Prothrombin Disorder and GERD.  She was admitted to the service of hospital medicine.  He was admitted to the service of hospital medicine with GIB.  He presented to [facility] via EMS with a one day onset of fever of 101.7F at home, weakness and confusion per family.  Work up ensued and he was given IV Rocephin, IV Acyclovir. LP was attempted but unsuccessful.  His Guaiac stool was positive and he was transferred to [facility] for gastroenterology services.  He stated that recently he has been feeling fatigued and sleepy - more so than normal.  Today he went to [place] and when he got home he was running a fever.  He endorsed cough, post nasal gtt, sinus congestion, and one episode this am of nausea and vomiting.  Family stated he was repeating himself and diaphoretic.  He denied sore throat, trouble swallowing, chills, chest pain, palpitations, wheezing, sob, abdominal pain, diarrhea, dysuria and leg swelling.  He reported that he takes pain medication and Xanax, but has not taken them within the past week.    * No surgery found *      Hospital Course:   Richard Bustos is a 72 yo male with PMHx significant for DM, Afib, HTN, CAD, MI, CHF, CKD, HLD, Hypercoaguable Prothrombin Disorder and GERD. He was noted to be febrile, clammy/diaphoretic and confused with a fever to 101.7 after spending the day at [place] [facility] -per history from daughter so was brought to the hospital by ambulance. Labs, ua, cxr were stable but a fobt  was done so he was transferred to [facility] for gi evaluation. He is on coumadin. He had no hematemesis, melena or hematochezia. He had one episode of vomiting earlier in the day prior to admission. He was initiated on iv rocephin for possible aspiration pneumonia and given ivf. He had 2 normal bms and no blood, returned to normal baseline condition and after cleared by Dr Walls from gi he is dc home, His last colonoscopy according to records was 2016.   He will take ceftin rx sent to pharmacy if has trouble with any productive cough green/phlegm and otherwise HH will follow.   PE -ALERT nad  heent-nontraumatic, oral mmm   lungs clear   cor rrr   abd soft, bsx4,  ext -no c/c/e   neuro-Ox3, sensation intact   skin -w/d  Psych-cooperative and calm. Insight good  His daughter that he lives with and son were both present at time of dc instructions.        Consults:   Consults (From admission, onward)        Status Ordering Provider     Inpatient consult to Gastroenterology  Once     Provider:  Saroj Borjas MD    Acknowledged LENNOX LEE new Assessment & Plan notes have been filed under this hospital service since the last note was generated.  Service: Hospital Medicine    Final Active Diagnoses:    Diagnosis Date Noted POA    PRINCIPAL PROBLEM:  GIB (gastrointestinal bleeding) [K92.2] 09/24/2018 Yes    Metabolic encephalopathy [G93.41] 10/07/2018 Yes    Hypotension [I95.9] 10/07/2018 Yes    Fever [R50.9] 10/07/2018 Yes    Type 2 diabetes mellitus with diabetic nephropathy [E11.21] 04/06/2015 Yes    Paroxysmal atrial fibrillation [I48.0] 11/21/2014 Yes    Long term (current) use of anticoagulants [Z79.01] 02/13/2013 Not Applicable    CKD (chronic kidney disease) stage 3, GFR 30-59 ml/min, 41 [N18.3] 01/23/2013 Yes    Hyperlipidemia, baseline  [E78.5] 12/16/2011 Yes    Hypercoagulable state, Prothrombin mutation [D68.59] 12/16/2011 Yes    CAD (coronary artery disease), NSTEMI, JAYCEE  x2, LAD & OM1, 8/18/2010, JAYCEE to RCA , 1/15/2015 [I25.10] 12/16/2011 Yes     Chronic      Problems Resolved During this Admission:       Discharged Condition: good    Disposition: Home-Health Care [facility]    Follow Up:    Patient Instructions:      Referral to Home health   Referral Priority: Routine Referral Type: Home Health   Referral Reason: Specialty Services Required   Requested Specialty: Home Health Services   Number of Visits Requested: 1     Activity as tolerated       Significant Diagnostic Studies:   Results for WEST ARTHUR (MRN [id number]) as of 10/7/2018 12:56   Ref. Range 10/6/2018 20:49 10/7/2018 00:07 10/7/2018 04:27   WBC Latest Ref Range: 3.90 - 12.70 K/uL 11.70  7.80   RBC Latest Ref Range: 4.60 - 6.20 M/uL 3.22 (L)  3.08 (L)   Hemoglobin Latest Ref Range: 14.0 - 18.0 g/dL 10.8 (L)  10.2 (L)   Hematocrit Latest Ref Range: 40.0 - 54.0 % 31.8 (L) 28.7 (L) 30.6 (L)   MCV Latest Ref Range: 82 - 98 fL 99 (H)  100 (H)   MCH Latest Ref Range: 27.0 - 31.0 pg 33.7 (H)  33.2 (H)   MCHC Latest Ref Range: 32.0 - 36.0 g/dL 34.1  33.3   RDW Latest Ref Range: 11.5 - 14.5 % 14.0  14.3   Platelets Latest Ref Range: 150 - 350 K/uL 180  169     Results for JOVANIGABRIELWEST (MRN [id number]) as of 10/7/2018 12:56   Ref. Range 10/6/2018 20:49   Protime Latest Ref Range: 9.7 - 11.8 sec 18.4 (H)   Coumadin Monitoring INR Latest Ref Range: 0.8 - 1.2  1.8 (H)     Results for JOVANIGABRIELWEST (MRN [id number]) as of 10/7/2018 12:56   Ref. Range 10/6/2018 20:49 10/7/2018 04:27   Sodium Latest Ref Range: 136 - 145 mmol/L 132 (L) 139   Potassium Latest Ref Range: 3.5 - 5.1 mmol/L 4.0 4.4   Chloride Latest Ref Range: 95 - 110 mmol/L 102 105   CO2 Latest Ref Range: 23 - 29 mmol/L 25 26   Anion Gap Latest Ref Range: 8 - 16 mmol/L 5 (L) 8   BUN, Bld Latest Ref Range: 8 - 23 mg/dL 27 (H) 30 (H)   Creatinine Latest Ref Range: 0.5 - 1.4 mg/dL 1.5 (H) 1.7 (H)   eGFR if non African American Latest Ref Range: >60 mL/min/1.73 m^2 46.2 (A) 40 (A)    eGFR if African American Latest Ref Range: >60 mL/min/1.73 m^2 53.4 (A) 46 (A)   Glucose Latest Ref Range: 70 - 110 mg/dL 169 (H) 139 (H)   Calcium Latest Ref Range: 8.7 - 10.5 mg/dL 8.4 (L) 8.7   Phosphorus Latest Ref Range: 2.7 - 4.5 mg/dL  3.3   Magnesium Latest Ref Range: 1.6 - 2.6 mg/dL  1.7   Alkaline Phosphatase Latest Ref Range: 55 - 135 U/L 58 67   Total Protein Latest Ref Range: 6.0 - 8.4 g/dL 6.6 5.8 (L)   Albumin Latest Ref Range: 3.5 - 5.2 g/dL 2.9 (L) 2.5 (L)   Total Bilirubin Latest Ref Range: 0.1 - 1.0 mg/dL 0.6 0.4   AST Latest Ref Range: 10 - 40 U/L 23 18   ALT Latest Ref Range: 10 - 44 U/L 13 (L) 13   Troponin I Latest Ref Range: 0.01 - 0.05 ng/mL 0.03    BNP Latest Ref Range: 0 - 99 pg/mL 419 (H)    Lactate, Maverick Latest Ref Range: 0.5 - 2.2 mmol/L 0.9    Hemoglobin A1C Latest Ref Range: 4.0 - 5.6 %  6.0 (H)   Estimated Avg Glucose Latest Ref Range: 68 - 131 mg/dL  126     CT head   . Mild-moderate cerebral atrophic or involutional and associated old or chronic microangiopathic white matter tract ischemic changes.  2. Incidentally noted atherosclerotic calcification along the margins of the distal vertebral arteries and, in particular, parasellar segments of the internal carotid arteries.  3. No evidence of active or acute process, in particular intracerebral extra-axial hemorrhage.  Concur with preliminary report rendered at the time of the study.    CXR-  Impression       1. Stable chest as compared to prior June 27 2014 exam.  2. Probable cardiomegaly, unchanged.  3. No active or acute pulmonary process.         Pending Diagnostic Studies:     Procedure Component Value Units Date/Time    Procalcitonin [[id number]] Collected:  10/07/18 0427    Order Status:  Sent Lab Status:  In process Updated:  10/07/18 1006    Specimen:  Blood          Medications:  Reconciled Home Medications:      Medication List      START taking these medications    cefUROXime 250 MG tablet  Commonly known as:   CEFTIN  Take 1 tablet (250 mg total) by mouth 2 (two) times daily.        CHANGE how you take these medications    warfarin 5 MG tablet  Commonly known as:  COUMADIN  TAKE 1 TABLET BY MOUTH EVERY DAY  What changed:  See the new instructions.        CONTINUE taking these medications    alendronate-vitamin D3 70 mg- 2,800 unit per tablet  Commonly known as:  FOSAMAX PLUS D  Take 1 tablet by mouth every 7 days.     allopurinol 100 MG tablet  Commonly known as:  ZYLOPRIM  TAKE 1 TABLET(100 MG) BY MOUTH EVERY DAY     ALPRAZolam 1 MG tablet  Commonly known as:  XANAX  TAKE 1 TABLET(1 MG) BY MOUTH EVERY EVENING     aspirin 81 MG EC tablet  Commonly known as:  ECOTRIN  Take 81 mg by mouth once daily.     atorvastatin 80 MG tablet  Commonly known as:  LIPITOR  TAKE 1 TABLET(80 MG) BY MOUTH EVERY DAY     calcitRIOL 0.5 MCG Cap  Commonly known as:  ROCALTROL  TK 1 C PO D     calcium carbonate 400 mg calcium (1,000 mg) Chew  Commonly known as:  TUMS ULTRA  Take 1 tablet (400 mg total) by mouth once daily.     * carvedilol 25 MG tablet  Commonly known as:  COREG  TAKE 1 TABLET BY MOUTH TWICE DAILY WITH MEALS     * carvedilol 25 MG tablet  Commonly known as:  COREG  TAKE 1 TABLET BY MOUTH TWICE DAILY WITH MEALS     CENTRUM SILVER ORAL  Take 1 tablet by mouth once daily.     ergocalciferol 50,000 unit Cap  Commonly known as:  ERGOCALCIFEROL  TK 1 C PO Q WEEK     escitalopram oxalate 10 MG tablet  Commonly known as:  LEXAPRO  TAKE 1/2 DAILY FOR FIRST 7 DAYS THEN THEREAFTER TAKE 1 TABLET BY MOUTH DAILY     ferrous sulfate 325 mg (65 mg iron) Tab tablet  Commonly known as:  FEOSOL  TAKE 1 TABLET BY MOUTH TWICE DAILY     fluticasone 50 mcg/actuation nasal spray  Commonly known as:  FLONASE  2 sprays by Each Nare route once daily.     FLUZONE HIGH-DOSE 2018-19 (PF) 180 mcg/0.5 mL vaccine  Generic drug:  influenza  Inject into the muscle.     furosemide 40 MG tablet  Commonly known as:  LASIX  TAKE 1 TABLET BY MOUTH DAILY AS NEEDED      HYDROcodone-acetaminophen 7.5-325 mg per tablet  Commonly known as:  NORCO  Take 1 tablet by mouth every 6 (six) hours as needed for Pain.     hydrocortisone 2.5 % cream  Apply topically 2 (two) times daily. for 10 days     ipratropium 0.02 % nebulizer solution  Commonly known as:  ATROVENT  Take 2.5 mLs (500 mcg total) by nebulization 4 (four) times daily. Rescue     L-METHYL-B6-B12 3-35-2 mg Tab  Generic drug:  mecobal-levomefolat Ca-B6 phos  TAKE 1 TABLET BY MOUTH TWICE DAILY     lisinopril 40 MG tablet  Commonly known as:  PRINIVIL,ZESTRIL  TAKE 1 TABLET BY MOUTH EVERY EVENING     MAGOX 400 mg tablet  Generic drug:  magnesium oxide  TAKE 1 TABLET BY MOUTH EVERY DAY     memantine 5 MG Tab  Commonly known as:  NAMENDA  Take 1 tablet (5 mg total) by mouth 2 (two) times daily.     mirabegron 50 mg Tb24  Commonly known as:  MYRBETRIQ  Take 1 tablet (50 mg total) by mouth once daily.     nitroGLYCERIN 0.4 MG/DOSE TL SPRY 400 mcg/spray spray  Commonly known as:  NITROLINGUAL  PLACE 1 SPRAY UNDER THE TONGUE EVERY 5 MINUTES AS NEEDED FOR CHEST PAIN     OCUVITE 477-28-5-150 mg-unit-mg-mg Cap  Generic drug:  vit C-vit E-lutein-min-om-3  Take 1 capsule by mouth once daily.     omeprazole 20 MG capsule  Commonly known as:  PRILOSEC  TAKE 1 CAPSULE(20 MG) BY MOUTH EVERY DAY     ranitidine 150 MG tablet  Commonly known as:  ZANTAC  TAKE 1 TABLET(150 MG) BY MOUTH TWICE DAILY     triamcinolone acetonide 0.1% 0.1 % cream  Commonly known as:  KENALOG  AAA bid         * This list has 2 medication(s) that are the same as other medications prescribed for you. Read the directions carefully, and ask your doctor or other care provider to review them with you.                Indwelling Lines/Drains at time of discharge:   Lines/Drains/Airways          None          Time spent on the discharge of patient: 36 minutes  Patient was seen and examined on the date of discharge and determined to be suitable for discharge.   discussed with   Kavita Terry MD  Department of Hospital Medicine  [facility]

## 2018-10-07 NOTE — PLAN OF CARE
Problem: Patient Care Overview  Goal: Plan of Care Review  Outcome: Ongoing (interventions implemented as appropriate)  Nc 2 lpm in use

## 2018-10-07 NOTE — ASSESSMENT & PLAN NOTE
Guaiac positive stool - transferred from [facility] for GI services - no melena or clots per patient.  History of anemia of chronic disease - will continue iron supplementation. Keep patient NPO.  Follow H/H closely. Type and screen blood and transfuse as needed.  Continue IV Protonix 40mg BID  Consult Gastronetrologist - follow recommendations.

## 2018-10-07 NOTE — PLAN OF CARE
Problem: Physical Therapy Goal  Goal: Physical Therapy Goal  Goals to be met by: 10-     Patient will increase functional independence with mobility by performin. Supine to sit with Modified Whitman  2. Sit to stand transfer with Modified Whitman  3. Bed to chair transfer with Modified Whitman using Single-point Cane   4. Gait  x 150 feet with Contact Guard Assistance using Single-point Cane/RW .   5. Lower extremity exercise program x20 reps per handout, with assistance as needed    Outcome: Ongoing (interventions implemented as appropriate)  PT eval and treat completed. Gait 50ft with RW with min assist. Pt with R diabetic foot ulcer/base of !st toe/plantar aspect- limited ambulation. OOB to chair

## 2018-10-07 NOTE — ASSESSMENT & PLAN NOTE
Subjective fever of 101.7F at home - documented 100.4F at [facility] with complaints of Upper Respiratory Symptoms.  No obvious source of infection. No leukocytosis.  Urinalysis negative.  Attempted LP at [facility] (failed).  Possibly viral or sinusitis?  Did receive flu shot a week ago - flu testing negative.  Was treated with IV Rocephin and IV Acyclovir in ED.  Will continue IV Rocephin, Add IV Azithromycin.  Check procalcitonin level.  Trend fever pattern.  Antipyretics prn.  Follow blood cultures for growth and sensitivity.

## 2018-10-07 NOTE — ED PROVIDER NOTES
Encounter Date: 10/7/2018    SCRIBE #1 NOTE: I, Erin Salazar , am scribing for, and in the presence of,  Dr. Gale . I have scribed the entire note.       History   No chief complaint on file.    10/07/2018  2:02 AM       The patient is a 73 y.o. male who is presenting per EMS from [facility] for further evaluation for a possible GI bleed. Per EMS, the pt was hypotensive and was found to have heme positive stool while at [facility].  Initially presented to their facility for fever and confusion.  Transferred here secondary to lack of GI.  No bloody bowel movements or melena there, only heme-positive stool.  Patient does report that he was sent here because he had a fever and he was confused earlier but states those symptoms have resolved and he feels back to normal. The pt endorses x 1 episode of vomiting and nausea that has since resolved. Pt denies recent emesis, fatigue, or bloody stool while at home. Pt is on blood thinners.       Pertinent PMHx includes Anemia of other chronic disease (9/13/2017), Anemia, chronic renal failure (9/13/2017), Anticoagulant long-term use, Aorta aneurysm, Arthritis, Atrial fibrillation, CAD (coronary artery disease), CHF (congestive heart failure), Chronic kidney disease, Clotting disorder (9/13/2017), Diabetes mellitus, Diabetes mellitus type II, Diverticulosis, Encounter for blood transfusion, Former smoker, GERD (gastroesophageal reflux disease), Gout, colonic polyps, Hypercoagulable state, Hyperlipidemia, Hypertension, MI, old (2010), Myocardial infarction, Osteomyelitis of ankle or foot (3/9/2017), Prostate cancer (06/2016), Sleep apnea, Squamous cell carcinoma (01/23/2018), and Venous stasis. Pertinent past surgical hx includes stents (8/18/2010); IVC filter retrieval; Rotator cuff repair; Gastric bypass (2/5/2008); Joint replacement (1996 and 2001); Cardiac surgery; Upper gastrointestinal endoscopy (02/2011); Colonoscopy (02/2011); Abdominal  surgery.       The history is provided by the patient and medical records.     Review of patient's allergies indicates:   Allergen Reactions    Shellfish containing products Other (See Comments)     Other reaction(s): Gout     Past Medical History:   Diagnosis Date    Anemia     Anemia of other chronic disease 9/13/2017    Anemia, chronic renal failure 9/13/2017    Anemia, unspecified 9/13/2017    Anticoagulant long-term use     Aorta aneurysm     Arthritis     Atrial fibrillation     CAD (coronary artery disease)     CHF (congestive heart failure)     Chronic kidney disease     Clotting disorder 9/13/2017    Colon polyp     Diabetes mellitus     not on meds    Diabetes mellitus type II     Diverticulosis     Elevated PSA     Encounter for blood transfusion     Former smoker     GERD (gastroesophageal reflux disease)     Gout     Hx of colonic polyps     Hypercoagulable state     Hyperlipidemia     Hypertension     MI, old 2010    Myocardial infarction     9/2010    Osteomyelitis of ankle or foot 3/9/2017    Prostate cancer 06/2016    Sleep apnea     Squamous cell carcinoma 01/23/2018    Left helix, imiquimod    Venous stasis     Chronic     Past Surgical History:   Procedure Laterality Date    ABDOMINAL SURGERY      BLOCK-NERVE-MEDIAL BRANCH-LUMBAR Bilateral 7/13/2017    [facility]    BLOCK-NERVE-MEDIAL BRANCH-LUMBAR Bilateral 6/22/2017    [facility] OR    CARDIAC SURGERY      COLONOSCOPY  02/2011    diverticulosis with diverticula with clot, no active bleeding    COLONOSCOPY N/A 8/24/2016    Procedure: COLONOSCOPY;  Surgeon: Saroj Borjas MD;  Location: [facility];  Service: Endoscopy;  Laterality: N/A;    COLONOSCOPY N/A 8/24/2016    [facility]    CYSTOSCOPY N/A 5/23/2016    [facility] at [facility]    GASTRIC BYPASS  2/5/2008    IVC FILTER RETRIEVAL       JOINT REPLACEMENT  1996 and 2001    bi-lat hip replacement/Rt Hip and Lt Hip    RADIOFREQUENCY THERMOCOAGULATION (RFTC)-NERVE-MEDIAN BRANCH-LUMBAR Bilateral 8/3/2017    [facility] OR    ROTATOR CUFF REPAIR      Rt shoulder    Stents  8/18/2010    x 3    TRANSRECTAL ULTRASOUND GUIDED PROSTATE BIOPSY N/A 8/1/2016    [facility] OR    TRANSRECTAL ULTRASOUND GUIDED PROSTATE BIOPSY N/A 5/23/2016    [facility] at [facility] OR    UPPER GASTROINTESTINAL ENDOSCOPY  02/2011     Family History   Problem Relation Age of Onset    Heart attack Mother     Heart attack Father     Heart attack Brother     Ulcerative colitis Daughter 35    Lupus Daughter     Colon cancer Neg Hx     Colon polyps Neg Hx     Crohn's disease Neg Hx     Melanoma Neg Hx     Psoriasis Neg Hx     Eczema Neg Hx      Social History     Tobacco Use    Smoking status: Former Smoker    Smokeless tobacco: Never Used    Tobacco comment: 45 yrs ago, only smoked 3-4 packs in his entire life as a teenager   Substance Use Topics    Alcohol use: Yes     Comment: rare    Drug use: No     Review of Systems   Constitutional: Positive for fever.   Respiratory: Negative for shortness of breath.    Cardiovascular: Negative for chest pain.   Gastrointestinal: Negative for abdominal pain.   Psychiatric/Behavioral: Positive for confusion.   All other systems reviewed and are negative.      Physical Exam     Initial Vitals   BP Pulse Resp Temp SpO2   -- -- -- -- --      MAP       --         Physical Exam    Nursing note and vitals reviewed.  Constitutional: He appears well-developed and well-nourished. He is not diaphoretic.  Non-toxic appearance. He does not have a sickly appearance. He does not appear ill. No distress.   Obese   HENT:   Head: Normocephalic and atraumatic.   Eyes: EOM are normal.   Neck: Normal range of motion. Neck supple. Normal range of motion  present. No neck rigidity.   Full ROM    Cardiovascular: Normal rate, regular rhythm and normal heart sounds. Exam reveals no gallop and no friction rub.    No murmur heard.  Pulmonary/Chest: Breath sounds normal. No respiratory distress. He has no wheezes. He has no rhonchi. He has no rales.   Abdominal: Soft. He exhibits no distension. There is no tenderness.   Musculoskeletal: Normal range of motion.   Neurological: He is alert and oriented to person, place, and time.   Skin: Skin is warm and dry. No rash noted.   Psychiatric: He has a normal mood and affect. His behavior is normal. Judgment and thought content normal.         ED Course   Procedures  Labs Reviewed - No data to display       Imaging Results    None          Medical Decision Making:   History:   I obtained history from: EMS provider.  Old Medical Records: I decided to obtain old medical records.  Clinical Tests:   Lab Tests: Reviewed  Radiological Study: Reviewed            Scribe Attestation:   Scribe #1: I performed the above scribed service and the documentation accurately describes the services I performed. I attest to the accuracy of the note.            ED Course as of Oct 07 0405   Sun Oct 07, 2018   0244 Jocelyne to admit  [EF]   0342 BP: (!) 92/51 [EF]      ED Course User Index  [EF] Varinder Gale MD     Clinical Impression:   The encounter diagnosis was Altered mental state.               I, Dr. Gale, personally performed the services described in this documentation. All medical record entries made by the scribe were at my direction and in my presence.  I have reviewed the chart and agree that the record reflects my personal performance and is accurate and complete.4:06 AM 10/07/2018      73-year-old male with numerous health problems presents to the ER because a transfer for heme-positive stool.  No GI at [facility].  Fever and altered mental status earlier today but this has resolved.  Slightly hypotensive in the emergency  room but consistent with prior blood pressures.  Patient states he feels back to normal and has no complaint at this time.  Patient denies bloody or black stool recently.  No bloody bowel movements in the ER.  Well-appearing, he will be placed in the observation unit overnight.               Varinder Gale MD  10/07/18 0403

## 2018-10-07 NOTE — ASSESSMENT & PLAN NOTE
Currently anticoagulated with Coumadin.  Holding coumadin 2/2 to GIB - resume once cleared with Gastroenterology.

## 2018-10-07 NOTE — H&P
[facility] Hospital Medicine  History & Physical    Patient Name: Richard Bustos  MRN: [id number]  Admission Date: 10/7/2018  Attending Physician: Eloise Terry MD   Primary Care Provider: Familia Ramirez Jr, MD         Patient information was obtained from patient, relative(s), past medical records and ER records.     Subjective:     Principal Problem:GIB (gastrointestinal bleeding)    Chief Complaint:   Chief Complaint   Patient presents with    GI Bleeding     Transfer from [facility] for admission fro GI bleed         HPI: Richard Bustos is a 74 yo male with PMHx significant for DM, Afib, HTN, CAD, MI, CHF, CKD, HLD, Hypercoaguable Prothrombin Disorder and GERD.  She was admitted to the service of hospital medicine.  He was admitted to the service of hospital medicine with GIB.  He presented to [facility] via EMS with a one day onset of fever of 101.7F at home, weakness and confusion per family.  Work up ensued and he was given IV Rocephin, IV Acyclovir. LP was attempted but unsuccessful.  His Guaiac stool was positive and he was transferred to [facility] for gastroenterology services.  He stated that recently he has been feeling fatigued and sleepy - more so than normal.  Today he went to [place] and when he got home he was running a fever.  He endorsed cough, post nasal gtt, sinus congestion, and one episode this am of nausea and vomiting.  Family stated he was repeating himself and diaphoretic.  He denied sore throat, trouble swallowing, chills, chest pain, palpitations, wheezing, sob, abdominal pain, diarrhea, dysuria and leg swelling.  He reported that he takes pain medication and Xanax, but has not taken them within the past week.    Past Medical History:   Diagnosis Date    Anemia     Anemia of other chronic disease 9/13/2017    Anemia, chronic renal failure 9/13/2017    Anemia, unspecified 9/13/2017    Anticoagulant long-term use     Aorta aneurysm     Arthritis     Atrial  fibrillation     CAD (coronary artery disease)     CHF (congestive heart failure)     Chronic kidney disease     Clotting disorder 9/13/2017    Colon polyp     Diabetes mellitus     not on meds    Diabetes mellitus type II     Diverticulosis     Elevated PSA     Encounter for blood transfusion     Former smoker     GERD (gastroesophageal reflux disease)     Gout     Hx of colonic polyps     Hypercoagulable state     Hyperlipidemia     Hypertension     MI, old 2010    Myocardial infarction     9/2010    Osteomyelitis of ankle or foot 3/9/2017    Prostate cancer 06/2016    Sleep apnea     Squamous cell carcinoma 01/23/2018    Left helix, imiquimod    Venous stasis     Chronic       Past Surgical History:   Procedure Laterality Date    ABDOMINAL SURGERY      BLOCK-NERVE-MEDIAL BRANCH-LUMBAR Bilateral 7/13/2017    Performed by Nile Causey [facility] at [facility]    BLOCK-NERVE-MEDIAL BRANCH-LUMBAR Bilateral 6/22/2017    Performed by Nile [facility]y [facility] at [facility]    CARDIAC SURGERY      COLONOSCOPY  02/2011    diverticulosis with diverticula with clot, no active bleeding    COLONOSCOPY N/A 8/24/2016    Procedure: COLONOSCOPY;  Surgeon: Saroj Borjas MD;  Location: [facility];  Service: Endoscopy;  Laterality: N/A;    COLONOSCOPY N/A 8/24/2016    [facility]O    CYSTOSCOPY N/A 5/23/2016    Performed by Adeline Moss MD at [facility]    GASTRIC BYPASS  2/5/2008    IVC FILTER RETRIEVAL      JOINT REPLACEMENT  1996 and 2001    bi-lat hip replacement/Rt Hip and Lt Hip    RADIOFREQUENCY THERMOCOAGULATION (RFTC)-NERVE-MEDIAN BRANCH-LUMBAR Bilateral 8/3/2017    Performed by Nile Causey MD at [facility]    ROTATOR CUFF REPAIR      Rt shoulder    Stents  8/18/2010    x 3    TRANSRECTAL ULTRASOUND GUIDED PROSTATE BIOPSY N/A 8/1/2016    Performed by Adeline Moss MD at [facility]    TRANSRECTAL ULTRASOUND GUIDED PROSTATE BIOPSY N/A 5/23/2016    Performed by  [facility] at [facility]    UPPER GASTROINTESTINAL ENDOSCOPY  02/2011       Review of patient's allergies indicates:   Allergen Reactions    Shellfish containing products Other (See Comments)     Other reaction(s): Gout       Current Facility-Administered Medications on File Prior to Encounter   Medication    [COMPLETED] 0.9%  NaCl infusion    [COMPLETED] 0.9%  NaCl infusion    [COMPLETED] acetaminophen tablet 1,000 mg    [COMPLETED] acyclovir (ZOVIRAX) 710 mg in dextrose 5 % 250 mL IVPB    [COMPLETED] cefTRIAXone (ROCEPHIN) 2 g in dextrose 5 % 50 mL IVPB    [COMPLETED] lidocaine HCL 20 mg/ml (2%) 20 mg/mL (2 %) injection    [COMPLETED] pantoprazole injection 40 mg     Current Outpatient Medications on File Prior to Encounter   Medication Sig    alendronate-vitamin D3 (FOSAMAX PLUS D) 70 mg- 2,800 unit per tablet Take 1 tablet by mouth every 7 days.    allopurinol (ZYLOPRIM) 100 MG tablet TAKE 1 TABLET(100 MG) BY MOUTH EVERY DAY    ALPRAZolam (XANAX) 1 MG tablet TAKE 1 TABLET(1 MG) BY MOUTH EVERY EVENING    aspirin (ECOTRIN) 81 MG EC tablet Take 81 mg by mouth once daily.    atorvastatin (LIPITOR) 80 MG tablet TAKE 1 TABLET(80 MG) BY MOUTH EVERY DAY    calcitRIOL (ROCALTROL) 0.5 MCG Cap TK 1 C PO D    calcium carbonate (TUMS ULTRA) 400 mg calcium (1,000 mg) Chew Take 1 tablet (400 mg total) by mouth once daily.    carvedilol (COREG) 25 MG tablet TAKE 1 TABLET BY MOUTH TWICE DAILY WITH MEALS    carvedilol (COREG) 25 MG tablet TAKE 1 TABLET BY MOUTH TWICE DAILY WITH MEALS    ergocalciferol (ERGOCALCIFEROL) 50,000 unit Cap TK 1 C PO Q WEEK    escitalopram oxalate (LEXAPRO) 10 MG tablet TAKE 1/2 DAILY FOR FIRST 7 DAYS THEN THEREAFTER TAKE 1 TABLET BY MOUTH DAILY    ferrous sulfate 325 mg (65 mg iron) Tab tablet TAKE 1 TABLET BY MOUTH TWICE DAILY    fluticasone (FLONASE) 50 mcg/actuation nasal spray 2 sprays by Each Nare route once daily.    FOLIC ACID/MULTIVIT-MIN/LUTEIN (CENTRUM  SILVER ORAL) Take 1 tablet by mouth once daily.    furosemide (LASIX) 40 MG tablet TAKE 1 TABLET BY MOUTH DAILY AS NEEDED    HYDROcodone-acetaminophen (NORCO) 7.5-325 mg per tablet Take 1 tablet by mouth every 6 (six) hours as needed for Pain.    hydrocortisone 2.5 % cream Apply topically 2 (two) times daily. for 10 days    influenza (FLUZONE HIGH-DOSE) 180 mcg/0.5 mL vaccine Inject into the muscle.    ipratropium (ATROVENT) 0.02 % nebulizer solution Take 2.5 mLs (500 mcg total) by nebulization 4 (four) times daily. Rescue    L-METHYL-B6-B12 3-35-2 mg Tab TAKE 1 TABLET BY MOUTH TWICE DAILY    lisinopril (PRINIVIL,ZESTRIL) 40 MG tablet TAKE 1 TABLET BY MOUTH EVERY EVENING    MAGOX 400 mg tablet TAKE 1 TABLET BY MOUTH EVERY DAY    memantine (NAMENDA) 5 MG Tab Take 1 tablet (5 mg total) by mouth 2 (two) times daily.    mirabegron (MYRBETRIQ) 50 mg Tb24 Take 1 tablet (50 mg total) by mouth once daily.    nitroGLYCERIN 0.4 MG/DOSE TL SPRY (NITROLINGUAL) 400 mcg/spray spray PLACE 1 SPRAY UNDER THE TONGUE EVERY 5 MINUTES AS NEEDED FOR CHEST PAIN    omeprazole (PRILOSEC) 20 MG capsule TAKE 1 CAPSULE(20 MG) BY MOUTH EVERY DAY    ranitidine (ZANTAC) 150 MG tablet TAKE 1 TABLET(150 MG) BY MOUTH TWICE DAILY    triamcinolone acetonide 0.1% (KENALOG) 0.1 % cream AAA bid    vit C-vit E-lutein-min-om-3 (OCUVITE) 122-78-1-150 mg-unit-mg-mg Cap Take 1 capsule by mouth once daily.    warfarin (COUMADIN) 5 MG tablet TAKE 1 TABLET BY MOUTH EVERY DAY (Patient taking differently: TAKE 1 TABLET BY MOUTH EVERY DAY EXCEPT 1 1/2 TABLETS ON WEDNESDAY)     Family History     Problem Relation (Age of Onset)    Heart attack Mother, Father, Brother    Lupus Daughter    Ulcerative colitis Daughter (35)        Tobacco Use    Smoking status: Former Smoker    Smokeless tobacco: Never Used    Tobacco comment: 45 yrs ago, only smoked 3-4 packs in his entire life as a teenager   Substance and Sexual Activity    Alcohol use: Yes      Comment: rare    Drug use: No    Sexual activity: Not on file     Review of Systems   Constitutional: Positive for diaphoresis and fever. Negative for activity change, appetite change, chills and fatigue.   HENT: Positive for congestion and postnasal drip. Negative for hearing loss, sore throat and trouble swallowing.    Eyes: Negative for photophobia and visual disturbance.   Respiratory: Positive for cough. Negative for shortness of breath and wheezing.    Cardiovascular: Negative for chest pain, palpitations and leg swelling.   Gastrointestinal: Negative for abdominal pain, diarrhea, nausea and vomiting.   Endocrine: Negative for polydipsia, polyphagia and polyuria.   Genitourinary: Negative for difficulty urinating, dysuria, frequency and urgency.   Musculoskeletal: Positive for back pain (Chronic). Negative for neck pain and neck stiffness.   Skin: Negative for rash and wound.   Neurological: Negative for dizziness, syncope, speech difficulty, weakness, numbness and headaches.   Psychiatric/Behavioral: Positive for confusion (Per family). The patient is not nervous/anxious.      Objective:     Vital Signs (Most Recent):  Temp: 98.7 °F (37.1 °C) (10/07/18 0205)  Pulse: (!) 54 (10/07/18 0232)  Resp: 18 (10/07/18 0205)  BP: (!) 92/51 (10/07/18 0232)  SpO2: 98 % (10/07/18 0232) Vital Signs (24h Range):  Temp:  [98.1 °F (36.7 °C)-100.4 °F (38 °C)] 98.7 °F (37.1 °C)  Pulse:  [53-87] 54  Resp:  [12-34] 18  SpO2:  [92 %-98 %] 98 %  BP: ()/(36-67) 92/51     Weight: 131.5 kg (290 lb)  Body mass index is 42.83 kg/m².    Physical Exam   Constitutional: He is oriented to person, place, and time. He appears well-developed and well-nourished. No distress.   Obese.   HENT:   Head: Normocephalic and atraumatic.   Mouth/Throat: Oropharynx is clear and moist.   Eyes: Conjunctivae and EOM are normal. Pupils are equal, round, and reactive to light. No scleral icterus.   Neck: Normal range of motion. Neck supple. No JVD  present. No tracheal deviation present. No thyromegaly present.   Cardiovascular: Regular rhythm, normal heart sounds and intact distal pulses. Bradycardia present. Exam reveals no gallop and no friction rub.   No murmur heard.  Pulses:       Dorsalis pedis pulses are 2+ on the right side, and 2+ on the left side.   Pulmonary/Chest: Effort normal and breath sounds normal. No accessory muscle usage. No respiratory distress. He has no wheezes. He has no rhonchi. He has no rales.   Abdominal: Soft. Bowel sounds are normal. He exhibits mass (Left lower quadrant ). He exhibits no distension. There is no tenderness. There is no guarding.   Musculoskeletal: Normal range of motion. He exhibits no edema, tenderness or deformity.   BARNEY to command.   Neurological: He is alert and oriented to person, place, and time. He has normal strength. He exhibits normal muscle tone. Coordination normal. GCS eye subscore is 4. GCS verbal subscore is 5. GCS motor subscore is 6.   Skin: Skin is warm, dry and intact. Capillary refill takes less than 2 seconds. No rash noted. He is not diaphoretic. No erythema.   Psychiatric: He has a normal mood and affect. His speech is normal and behavior is normal. Judgment and thought content normal.   Vitals reviewed.        CRANIAL NERVES     CN III, IV, VI   Pupils are equal, round, and reactive to light.  Extraocular motions are normal.        Significant Labs:   CBC:   Recent Labs   Lab  10/06/18   2049  10/06/18   2139  10/07/18   0007   WBC  11.70   --    --    HGB  10.8*   --    --    HCT  31.8*  30*  28.7*   PLT  180   --    --      CMP:   Recent Labs   Lab  10/06/18   2049   NA  132*   K  4.0   CL  102   CO2  25   GLU  169*   BUN  27*   CREATININE  1.5*   CALCIUM  8.4*   PROT  6.6   ALBUMIN  2.9*   BILITOT  0.6   ALKPHOS  58   AST  23   ALT  13*   ANIONGAP  5*   EGFRNONAA  46.2*     Cardiac Markers:   Recent Labs   Lab  10/06/18   2049   BNP  419*     Coagulation:   Recent Labs   Lab  10/06/18    2049   INR  1.8*     Lactic Acid:   Recent Labs   Lab  10/06/18   2049   LACTATE  0.9     Troponin:   Recent Labs   Lab  10/06/18   2049   TROPONINI  0.03     Urine Studies:   Recent Labs   Lab  10/06/18   2108   COLORU  Yellow   APPEARANCEUA  Clear   PHUR  6.0   SPECGRAV  1.025   PROTEINUA  2+*   GLUCUA  Negative   KETONESU  Negative   BILIRUBINUA  Negative   OCCULTUA  Trace*   NITRITE  Negative   UROBILINOGEN  Negative   LEUKOCYTESUR  Negative   RBCUA  3   WBCUA  0   BACTERIA  Few*   HYALINECASTS  0       Significant Imaging:    CXR: my read: no opacities, no infiltrates.    CT abdomen: Negative per [facility][note 3]      Assessment/Plan:     * GIB (gastrointestinal bleeding)    Guaiac positive stool - transferred from [facility][note 3] for GI services - no melena or clots per patient.  History of anemia of chronic disease - will continue iron supplementation. Keep patient NPO.  Follow H/H closely. Type and screen blood and transfuse as needed.  Continue IV Protonix 40mg BID  Consult Gastronetrologist - follow recommendations.                   Fever    Subjective fever of 101.7F at home - documented 100.4F at [facility][note 3] with complaints of Upper Respiratory Symptoms.  No obvious source of infection. No leukocytosis.  Urinalysis negative.  Attempted LP at [facility][note 3] (failed).  Possibly viral or sinusitis?  Did receive flu shot a week ago - flu testing negative.  Was treated with IV Rocephin and IV Acyclovir in ED.  Will continue IV Rocephin, Add IV Azithromycin.  Check procalcitonin level.  Trend fever pattern.  Antipyretics prn.  Follow blood cultures for growth and sensitivity.          Metabolic encephalopathy    Patient with confusion and somnolence at [facility][note 3] - resolved upon transfer to [facility] - ??related to fever or could this be baseline cognition related or medications?  Recently diagnosed with Mild cognitive Impairment 09/18/2018 - started on Namenda.            Hypotension    Questionable etiology - possibly multifactorial IVVD, medication  induced, ? Infection/virus  Patient was repleted with 1.25L NS and blood pressure responded nicely.  Will monitor b/p closely.  Hold all home antihypertensives for now and resume once clinically appropriate.          Type 2 diabetes mellitus with diabetic nephropathy    Chronic problem.  Not on oral diabetic medications. Will Check blood glucose level q AC/HS. And utilize Novolog Insulin Sliding Scale as needed. Will check HgA1C for control.              Paroxysmal atrial fibrillation    Chronic problem.  Currently in Sinus Bradycardia.  Anticoagulated with Coumadin - will hold for now 2/2 to possible GIB.  Will hold Beta Blockade for now as well 2/2 to hypotension - resume once clinically appropriate.  Continuous Cardiac Monitoring.          CKD (chronic kidney disease) stage 3, GFR 30-59 ml/min, 41    Chronic problem. At baseline.  Will monitor BUN/SCr, Monitor electrolytes.  Renal dose medications for Estimated Creatinine Clearance: 60.9 mL/min (A) (based on SCr of 1.5 mg/dL (H)).  Avoid non essential nephrotoxins        Hypercoagulable state, Prothrombin mutation    Currently anticoagulated with Coumadin.  Holding coumadin 2/2 to GIB - resume once cleared with Gastroenterology.          Hyperlipidemia, baseline     Chronic. Continue Statin.          CAD (coronary artery disease), NSTEMI, JAYCEE x2, LAD & OM1, 8/18/2010, JAYCEE to RCA , 1/15/2015    History of CAD with MI and stent placement.  No s/s of angina.  Holding BB for now 2/2 to hypotension - resume once clinically appropriate.  Holding ASA 2/2 to GIB.  Continue Statin.  Cardiac monitoring.  Monitor closely for s/s of ACS.            VTE Risk Mitigation (From admission, onward)        Ordered     Place KURTIS hose  Until discontinued      10/07/18 043             Crystal Liu NP  Department of Hospital Medicine   [facility]

## 2018-10-07 NOTE — ASSESSMENT & PLAN NOTE
Chronic problem. At baseline.  Will monitor BUN/SCr, Monitor electrolytes.  Renal dose medications for Estimated Creatinine Clearance: 60.9 mL/min (A) (based on SCr of 1.5 mg/dL (H)).  Avoid non essential nephrotoxins

## 2018-10-07 NOTE — ASSESSMENT & PLAN NOTE
Chronic problem.  Currently in Sinus Bradycardia.  Anticoagulated with Coumadin - will hold for now 2/2 to possible GIB.  Will hold Beta Blockade for now as well 2/2 to hypotension - resume once clinically appropriate.  Continuous Cardiac Monitoring.

## 2018-10-07 NOTE — PLAN OF CARE
Home health referral sent to [facility] via [facility] system. Notified [facility] on call nursing for auth; pending call back     1323- Received call back from Felicia, will notify [facility] , pending call back    Per Felicia voicemail, [facility] declined. [facility] accepted referral.

## 2018-10-07 NOTE — ED NOTES
Assumed care:  Patient awake, alert and oriented x 3, skin warm and dry, in NAD with family at bedside.  Patient arrived from [facility] via EMS.    Patient identifiers for Richard Bustos checked and correct.  LOC:  Patient is awake, alert, and aware of environment with an appropriate affect. Patient is oriented x 3 and speaking appropriately.  APPEARANCE:  Patient resting comfortably and in no acute distress. Patient is clean and well groomed, patient's clothing is properly fastened.  SKIN:  The skin is warm and dry. Patient has normal skin turgor and moist mucus membranes. Multiple BLE sores in various stages of healing from kitten scratches.  Right foot ulcer  MUSCULOSKELETAL:  Patient is moving all extremities well, no obvious deformities noted. Pulses intact.   RESPIRATORY:  Airway is open and patent. Respirations are spontaneous and non-labored with normal effort and rate.  CARDIAC:  Sinus casimiro. No peripheral edema noted. Capillary refill < 3 seconds.  ABDOMEN:  No distention noted.  Soft and non-tender upon palpation.  GI bleed, incontinence.  NEUROLOGICAL:  PERRL. Facial expression is symmetrical. Hand grasps are equal bilaterally. Normal sensation in all extremities when touched with finger.  Allergies reported:    Review of patient's allergies indicates:   Allergen Reactions    Shellfish containing products Other (See Comments)     Other reaction(s): Gout     OTHER NOTES:  Patient with GI bleed.

## 2018-10-07 NOTE — PLAN OF CARE
Discharge planner met with patient at the bedside, Patient is a 30- day readmission -DC from [facility]. Verified PCP as: Dr. Ramirez ; Preferred pharmacy: [address]   Denies dialysis care;  Patient uses Coumadin for home use; Denies active home health services; Uses shower chair, wheel chair, straight cane, and rolling walker for home use. Patient resides at home with his daughter Delmy and his 9 yr old grandson. Patient is minimum dependent in ADL's; daughter assist with bathing, meal prepping, and cleaning; provides his own transportation. Dc plan is home with home health; patient choice disclosure form signed. ROSA explained to patient; verbalized understanding and signed notice.  Patient may be discharging to different address than on file ; [address] [id number]. Family or friend to provide transportation home at the time of discharge.   10/7/18 1045   Discharge Assessment   Assessment Type Discharge Planning Assessment   Confirmed/corrected address and phone number on face sheet? Yes   Assessment information obtained from? Patient    Communicated expected length of stay with patient/caregiver Yes, observation    Type of Healthcare Directive Received Medical Power of ; Citlali Frye [phone]   If Healthcare Directive is received, is it scanned into Epic? no (comment)   Prior to hospitalization cognitive status: Alert/Oriented   Prior to hospitalization functional status: Minimum dependent; assistive equipment    Current cognitive status: Alert/Oriented    Current Functional Status: Minimum dependent; assistive equipment   Arrived From home or self-care   Lives With Daughter    Able to Return to Prior Arrangements Yes    Is patient able to care for self after discharge? No    Do you have any financial concerns preventing you from receiving the healthcare you need? No   Does the patient have transportation to healthcare appointments? Yes   Transportation Available  family or friend will provide   Discharge Plan A Home with home health    Discharge Plan B Home with family    Patient/Family In Agreement With Plan Yes

## 2018-10-07 NOTE — NURSING
Discharge instructions given to pt and family.  Pt discharged to home via w/c with nurse and family.

## 2018-10-07 NOTE — CONSULTS
"[facility]     CC: Anemia    HPI 73 y.o. male with anemia, chronic, remote onset, ongoing, with no alleviating/exacerbating factors, at his baseline on this presentation, with no evidence of active GI bleeding whatsoever.  Apparently, patient went to [facility] and had occurrence of fever and AMS after that.  He went to [facility] and was evaluated.  He had admission for minor rectal bleeding in the recent past and he states that at that time, blood was BRBPR, single episode, small amount and has not recurred.  This time, hospitalist was consulted and asked ER physician to perform hemoccult and to transfer for GI services if positive.  Patient was subsequently sent to [facility] from [facility].  He states that he feels much better since arrival and has not had any evidence of bleeding.  He actually had normal formed nonbloody BM x 2 today.  He states that he thinks the "heat just got to him".  He had colonoscopy in 08/2016 with me which showed small polyps, diverticulosis, and hemorrhoids.  No other complaints.  Most recent iron panel consistent with anemia of chronic disease (elevated ferritin, depressed TIBC) and NOT with iron deficiency anemia.  No other complaints.  Case discussed with Dr. Terry at bedside who states that fever resolved.  He also has a history of prostate cancer status post chemo and XRT in the past.  He had some mild hypotension on initial presentation but no tachycardia.  He is on chronic anticoagulation for known history of hypercoaguable disease.    Past Medical History:   Diagnosis Date    Anemia     Anemia of other chronic disease 9/13/2017    Anemia, chronic renal failure 9/13/2017    Anemia, unspecified 9/13/2017    Anticoagulant long-term use     Aorta aneurysm     Arthritis     Atrial fibrillation     CAD (coronary artery disease)     CHF (congestive heart failure)     Chronic kidney disease     Clotting disorder 9/13/2017    Colon polyp     Diabetes mellitus     " not on meds    Diabetes mellitus type II     Diverticulosis     Elevated PSA     Encounter for blood transfusion     Former smoker     GERD (gastroesophageal reflux disease)     Gout     Hx of colonic polyps     Hypercoagulable state     Hyperlipidemia     Hypertension     MI, old 2010    Myocardial infarction     9/2010    Osteomyelitis of ankle or foot 3/9/2017    Prostate cancer 06/2016    Sleep apnea     Squamous cell carcinoma 01/23/2018    Left helix, imiquimod    Venous stasis     Chronic       Past Surgical History:   Procedure Laterality Date    ABDOMINAL SURGERY      BLOCK-NERVE-MEDIAL BRANCH-LUMBAR Bilateral 7/13/2017    [facility]    BLOCK-NERVE-MEDIAL BRANCH-LUMBAR Bilateral 6/22/2017    [facility]    CARDIAC SURGERY      COLONOSCOPY  02/2011    diverticulosis with diverticula with clot, no active bleeding    COLONOSCOPY N/A 8/24/2016    Procedure: COLONOSCOPY;  Surgeon: Saroj Borjas MD;  Location: [facility];  Service: Endoscopy;  Laterality: N/A;    COLONOSCOPY N/A 8/24/2016    [facility]    CYSTOSCOPY N/A 5/23/2016    [facility]    GASTRIC BYPASS  2/5/2008    IVC FILTER RETRIEVAL      JOINT REPLACEMENT  1996 and 2001    bi-lat hip replacement/Rt Hip and Lt Hip    RADIOFREQUENCY THERMOCOAGULATION (RFTC)-NERVE-MEDIAN BRANCH-LUMBAR Bilateral 8/3/2017    [facility]    ROTATOR CUFF REPAIR      Rt shoulder    Stents  8/18/2010    x 3    TRANSRECTAL ULTRASOUND GUIDED PROSTATE BIOPSY N/A 8/1/2016    [facility]    TRANSRECTAL ULTRASOUND GUIDED PROSTATE BIOPSY N/A 5/23/2016    [facility]    UPPER GASTROINTESTINAL ENDOSCOPY  02/2011       Social History     Tobacco Use    Smoking status: Former Smoker    Smokeless tobacco: Never  "Used    Tobacco comment: 45 yrs ago, only smoked 3-4 packs in his entire life as a teenager   Substance Use Topics    Alcohol use: Yes     Comment: rare    Drug use: No       Family History   Problem Relation Age of Onset    Heart attack Mother     Heart attack Father     Heart attack Brother     Ulcerative colitis Daughter 35    Lupus Daughter     Colon cancer Neg Hx     Colon polyps Neg Hx     Crohn's disease Neg Hx     Melanoma Neg Hx     Psoriasis Neg Hx     Eczema Neg Hx        Review of Systems  General ROS: negative for - chills, fever or weight loss  Psychological ROS: negative for - hallucination, depression or suicidal ideation  Ophthalmic ROS: negative for - blurry vision, photophobia or eye pain  ENT ROS: negative for - epistaxis, sore throat or rhinorrhea  Respiratory ROS: no cough, shortness of breath, or wheezing  Cardiovascular ROS: no chest pain or dyspnea on exertion  Gastrointestinal ROS: no bleeding or abdominal pain  Genito-Urinary ROS: no dysuria, trouble voiding, or hematuria  Musculoskeletal ROS: negative for - arthralgia, myalgia, weakness  Neurological ROS: no syncope or seizures; no ataxia  Dermatological ROS: negative for pruritis, rash and jaundice    Physical Examination  /64 (BP Location: Right arm, Patient Position: Lying)   Pulse 62   Temp 97.4 °F (36.3 °C) (Oral)   Resp 18   Ht 5' 9" (1.753 m)   Wt 132.3 kg (291 lb 10.7 oz)   SpO2 (!) 94%   BMI 43.07 kg/m²   General appearance: alert, cooperative, no distress  HENT: Normocephalic, atraumatic, neck symmetrical, no nasal discharge   Eyes: conjunctivae/corneas clear, PERRL, EOM's intact, sclera anicteric  Lungs: clear to auscultation bilaterally, no dullness to percussion bilaterally, symmetric expansion, breathing unlabored  Heart: regular rate and rhythm without rub; no displacement of the PMI   Abdomen: obese, NT + BS  Extremities: extremities symmetric; no clubbing, cyanosis, or edema  Integument: Skin " color, texture, turgor normal; no rashes; hair distrubution normal, no jaundice  Neurologic: Alert and oriented X 3, no focal sensory or motor neurologic deficits  Psychiatric: no pressured speech; normal affect; no evidence of impaired cognition, no anxiety/depression     Labs:  Lab Results   Component Value Date    WBC 7.80 10/07/2018    HGB 10.2 (L) 10/07/2018    HCT 30.6 (L) 10/07/2018     (H) 10/07/2018     10/07/2018       CMP  Sodium   Date Value Ref Range Status   10/07/2018 139 136 - 145 mmol/L Final     Potassium   Date Value Ref Range Status   10/07/2018 4.4 3.5 - 5.1 mmol/L Final     Chloride   Date Value Ref Range Status   10/07/2018 105 95 - 110 mmol/L Final     CO2   Date Value Ref Range Status   10/07/2018 26 23 - 29 mmol/L Final     Glucose   Date Value Ref Range Status   10/07/2018 139 (H) 70 - 110 mg/dL Final     BUN, Bld   Date Value Ref Range Status   10/07/2018 30 (H) 8 - 23 mg/dL Final     Creatinine   Date Value Ref Range Status   10/07/2018 1.7 (H) 0.5 - 1.4 mg/dL Final     Calcium   Date Value Ref Range Status   10/07/2018 8.7 8.7 - 10.5 mg/dL Final     Total Protein   Date Value Ref Range Status   10/07/2018 5.8 (L) 6.0 - 8.4 g/dL Final     Albumin   Date Value Ref Range Status   10/07/2018 2.5 (L) 3.5 - 5.2 g/dL Final     Total Bilirubin   Date Value Ref Range Status   10/07/2018 0.4 0.1 - 1.0 mg/dL Final     Comment:     For infants and newborns, interpretation of results should be based  on gestational age, weight and in agreement with clinical  observations.  Premature Infant recommended reference ranges:  Up to 24 hours.............<8.0 mg/dL  Up to 48 hours............<12.0 mg/dL  3-5 days..................<15.0 mg/dL  6-29 days.................<15.0 mg/dL       Alkaline Phosphatase   Date Value Ref Range Status   10/07/2018 67 55 - 135 U/L Final     AST   Date Value Ref Range Status   10/07/2018 18 10 - 40 U/L Final     ALT   Date Value Ref Range Status   10/07/2018 13  10 - 44 U/L Final     Anion Gap   Date Value Ref Range Status   10/07/2018 8 8 - 16 mmol/L Final     eGFR if    Date Value Ref Range Status   10/07/2018 46 (A) >60 mL/min/1.73 m^2 Final     eGFR if non    Date Value Ref Range Status   10/07/2018 40 (A) >60 mL/min/1.73 m^2 Final     Comment:     Calculation used to obtain the estimated glomerular filtration  rate (eGFR) is the CKD-EPI equation.          Lab Results   Component Value Date    IRON 46 10/01/2018    TIBC 184 (L) 10/01/2018    FERRITIN 487 (H) 07/19/2018         Imaging:  CXR was independently visualized and reviewed by me and showed cardiomegaly.    I have personally reviewed these images    Case discussed with Dr. Terry in person and is as above in HPI    Old records from ER physician and hospitalist at [facility] reviewed and are as summarized above in the HPI.      Assessment:   1.  Confusion/AMS with fever - resolved  2.  History of prostate CA status post chemo and XRT  3.  History of diverticulosis  4.  History of hemorrhoids  5.  Heme positive stool with no evidence of acute or overt GI bleeding whatsoever and no complaint of this on presentation to the ER.  6.  Chronic anticoagulation      Plan:  1.  Diet as tolerated  2.  No need for urgent GI evaluation.  He will likely not need endoscopy as he does not have iron deficiency anemia, has no complaint of blood loss, has a known history of diverticulosis/hemorrhoids/prostate radiation and chronic anticoagulation which would account for hemoccult positivity, and has no upper GI complaints.  He should not get ANY future hemoccult testing as it will likely always be positive in his case given his history and is thus NOT a reliable indicator of acute GI bleeding.    3.  OK to discharge home and follow up as outpatient in GI clinic PRN.  4.  Further recommendations to follow after above  5.  Communication will be sent to the referring MD, Dr. Terry regarding my assessment  and plan on this patient via EPIC.      Saroj Silveira MD  [facility]  [address], [address]  [place] [address]  Office: [phone]  Fax: [phone]

## 2018-10-07 NOTE — ASSESSMENT & PLAN NOTE
History of CAD with MI and stent placement.  No s/s of angina.  Holding BB for now 2/2 to hypotension - resume once clinically appropriate.  Holding ASA 2/2 to GIB.  Continue Statin.  Cardiac monitoring.  Monitor closely for s/s of ACS.

## 2018-10-07 NOTE — ASSESSMENT & PLAN NOTE
Chronic problem.  Not on oral diabetic medications. Will Check blood glucose level q AC/HS. And utilize Novolog Insulin Sliding Scale as needed. Will check HgA1C for control.

## 2018-10-07 NOTE — PT/OT/SLP EVAL
"Physical Therapy Evaluation    Patient Name:  Richard Bustos   MRN:  [id number]    Recommendations:     Discharge Recommendations:  home health PT   Discharge Equipment Recommendations: none   Barriers to discharge: None    Assessment:     Richard Bustos is a 73 y.o. male admitted with a medical diagnosis of GIB (gastrointestinal bleeding).  He presents with the following impairments/functional limitations:  weakness, impaired endurance, impaired sensation, impaired functional mobilty, gait instability, impaired skin, impaired cardiopulmonary response to activity . Pt stated feeling better and able to ambulate to bathroom then to hallway. Gait distance limited due to presence of R diabetic foot ulcer plantar aspect- pt has R Darco diabetic shoe.    Rehab Prognosis:  fair; patient would benefit from acute skilled PT services to address these deficits and reach maximum level of function.      Recent Surgery: * No surgery found *      Plan:     During this hospitalization, patient to be seen 6 x/week to address the above listed problems via gait training, therapeutic activities, therapeutic exercises  · Plan of Care Expires:  10/26/18   Plan of Care Reviewed with: patient    Subjective     Communicated with nurse Payton prior to session.  Patient found supine upon PT entry to room, agreeable to evaluation.    Stated " I don't walk too far due to R foot ulcer"  Pt stated feeling better/ hungry- is npo at this time  stated uses CPAP at night only    Chief Complaint: hungry  Patient comments/goals: get better  Pain/Comfort:  · Pain Rating 1: 0/10    Patients cultural, spiritual, Jew conflicts given the current situation:      Living Environment:  Home with daughter with 1 small step  Prior to admission, patients level of function was ambulatory limited distance with SC.  Patient has the following equipment: cane, straight, BIPAP.  DME owned (not currently used): rolling walker.  Upon discharge, patient will have " assistance from family.    Objective:     Patient found with: peripheral IV, telemetry, oxygen     General Precautions: Standard, fall(R diabetic foot ulcer)   Orthopedic Precautions:(RLE protective weight bearing- has R Darco diabetic shoe)   Braces: N/A     Exams:  · RLE ROM: WFL  · RLE Strength: WFL  · LLE ROM: WFL  · LLE Strength: WFL    Functional Mobility:  · Bed Mobility:     · Rolling Left:  stand by assistance  · Supine to Sit: stand by assistance  · Transfers:     · Sit to Stand:  contact guard assistance with straight cane  · Toilet Transfer: contact guard assistance with  grab bars  using  Stand Pivot  · Gait: 50ft with RW    AM-PAC 6 CLICK MOBILITY  Total Score:17       Therapeutic Activities and Exercises:   bathroom use to void in sitting, to sink to wash hands  Gait to hallways with RW  OOB to chair- encouraged ankle pumping exercises  SAT on RA 96-97%    Patient left up in chair with all lines intact, call button in reach and nurse Fito notified.    GOALS:   Multidisciplinary Problems     Physical Therapy Goals        Problem: Physical Therapy Goal    Goal Priority Disciplines Outcome Goal Variances Interventions   Physical Therapy Goal     PT, PT/OT Ongoing (interventions implemented as appropriate)     Description:  Goals to be met by: 10-     Patient will increase functional independence with mobility by performin. Supine to sit with Modified [place]  2. Sit to stand transfer with Modified [place]  3. Bed to chair transfer with Modified [place] using Single-point Cane   4. Gait  x 150 feet with Contact Guard Assistance using Single-point Cane/RW .   5. Lower extremity exercise program x20 reps per handout, with assistance as needed                      History:     Past Medical History:   Diagnosis Date    Anemia     Anemia of other chronic disease 2017    Anemia, chronic renal failure 2017    Anemia, unspecified 2017    Anticoagulant long-term  use     Aorta aneurysm     Arthritis     Atrial fibrillation     CAD (coronary artery disease)     CHF (congestive heart failure)     Chronic kidney disease     Clotting disorder 9/13/2017    Colon polyp     Diabetes mellitus     not on meds    Diabetes mellitus type II     Diverticulosis     Elevated PSA     Encounter for blood transfusion     Former smoker     GERD (gastroesophageal reflux disease)     Gout     Hx of colonic polyps     Hypercoagulable state     Hyperlipidemia     Hypertension     MI, old 2010    Myocardial infarction     9/2010    Osteomyelitis of ankle or foot 3/9/2017    Prostate cancer 06/2016    Sleep apnea     Squamous cell carcinoma 01/23/2018    Left helix, imiquimod    Venous stasis     Chronic       Past Surgical History:   Procedure Laterality Date    ABDOMINAL SURGERY      BLOCK-NERVE-MEDIAL BRANCH-LUMBAR Bilateral 7/13/2017    [facility]    BLOCK-NERVE-MEDIAL BRANCH-LUMBAR Bilateral 6/22/2017    [facility]    CARDIAC SURGERY      COLONOSCOPY  02/2011    diverticulosis with diverticula with clot, no active bleeding    COLONOSCOPY N/A 8/24/2016    Procedure: COLONOSCOPY;  Surgeon: Saroj Borjas MD;  [facility];  Service: Endoscopy;  Laterality: N/A;    COLONOSCOPY N/A 8/24/2016    [facility]    CYSTOSCOPY N/A 5/23/2016    [facility]    GASTRIC BYPASS  2/5/2008    IVC FILTER RETRIEVAL      JOINT REPLACEMENT  1996 and 2001    bi-lat hip replacement/Rt Hip and Lt Hip    RADIOFREQUENCY THERMOCOAGULATION (RFTC)-NERVE-MEDIAN BRANCH-LUMBAR Bilateral 8/3/2017    [facility] OR    ROTATOR CUFF REPAIR      Rt shoulder    Stents  8/18/2010    x 3    TRANSRECTAL ULTRASOUND GUIDED PROSTATE BIOPSY N/A 8/1/2016    [facility]ss [facility] at [facility]    TRANSRECTAL ULTRASOUND  GUIDED PROSTATE BIOPSY N/A 5/23/2016    [facility]    UPPER GASTROINTESTINAL ENDOSCOPY  02/2011       Clinical Decision Making:     History  Co-morbidities and personal factors that may impact the plan of care Examination  Body Structures and Functions, activity limitations and participation restrictions that may impact the plan of care Clinical Presentation   Decision Making/ Complexity Score   Co-morbidities:   [] Time since onset of injury / illness / exacerbation  [] Status of current condition  []Patient's cognitive status and safety concerns    [] Multiple Medical Problems (see med hx)  Personal Factors:   [] Patient's age  [] Prior Level of function   [] Patient's home situation (environment and family support)  [] Patient's level of motivation  [] Expected progression of patient      HISTORY:(criteria)    [] 15178 - no personal factors/history    [] 94320 - has 1-2 personal factor/comorbidity     [] 93522 - has >3 personal factor/comorbidity     Body Regions:  [] Objective examination findings  [] Head     []  Neck  [] Trunk   [] Upper Extremity  [] Lower Extremity    Body Systems:  [] For communication ability, affect, cognition, language, and learning style: the assessment of the ability to make needs known, consciousness, orientation (person, place, and time), expected emotional /behavioral responses, and learning preferences (eg, learning barriers, education  needs)  [] For the neuromuscular system: a general assessment of gross coordinated movement (eg, balance, gait, locomotion, transfers, and transitions) and motor function  (motor control and motor learning)  [] For the musculoskeletal system: the assessment of gross symmetry, gross range of motion, gross strength, height, and weight  [] For the integumentary system: the assessment of pliability(texture), presence of scar formation, skin color, and skin integrity  [] For cardiovascular/pulmonary system: the  assessment of heart rate, respiratory rate, blood pressure, and edema     Activity limitations:    [] Patient's cognitive status and saf ety concerns          [] Status of current condition      [] Weight bearing restriction  [] Cardiopulmunary Restriction    Participation Restrictions:   [] Goals and goal agreement with the patient     [] Rehab potential (prognosis) and probable outcome      Examination of Body System: (criteria)    [] 16722 - addressing 1-2 elements    [] 36045 - addressing a total of 3 or more elements     [] 80778 -  Addressing a total of 4 or more elements         Clinical Presentation: (criteria)  Choose one     On examination of body system using standardized tests and measures patient presents with (CHOOSE ONE) elements from any of the following: body structures and functions, activity limitations, and/or participation restrictions.  Leading to a clinical presentation that is considered (CHOOSE ONE)                              Clinical Decision Making  (Eval Complexity):  Choose One     Time Tracking:     PT Received On: 10/07/18  PT Start Time: 0901     PT Stop Time: 0925  PT Total Time (min): 24 min     Billable Minutes: Evaluation 10 and Gait Training 14      Deepti Nicolas, PT  10/07/2018

## 2018-10-07 NOTE — ASSESSMENT & PLAN NOTE
Questionable etiology - possibly multifactorial IVVD, medication induced, ? Infection/virus  Patient was repleted with 1.25L NS and blood pressure responded nicely.  Will monitor b/p closely.  Hold all home antihypertensives for now and resume once clinically appropriate.

## 2018-10-07 NOTE — HOSPITAL COURSE
Richard Bustos is a 72 yo male with PMHx significant for DM, Afib, HTN, CAD, MI, CHF, CKD, HLD, Hypercoaguable Prothrombin Disorder and GERD. He was noted to be febrile, clammy/diaphoretic and confused with a fever to 101.7 after spending the day at [facility] -per history from daughter so was brought to the hospital by ambulance. Labs, ua, cxr were stable but a fobt was done so he was transferred to [facility] for gi evaluation. He is on coumadin. He had no hematemesis, melena or hematochezia. He had one episode of vomiting earlier in the day prior to admission. He was initiated on iv rocephin for possible aspiration pneumonia and given ivf. He had 2 normal bms and no blood, returned to normal baseline condition and after cleared by Dr Walls from gi he is dc home, His last colonoscopy according to records was 2016.   He will take ceftin rx sent to pharmacy if has trouble with any productive cough green/phlegm and otherwise HH will follow.   PE -ALERT nad  heent-nontraumatic, oral mmm   lungs clear   cor rrr   abd soft, bsx4,  ext -no c/c/e   neuro-Ox3, sensation intact   skin -w/d  Psych-cooperative and calm. Insight good  His daughter that he lives with and son were both present at time of dc instructions.

## 2018-10-07 NOTE — ASSESSMENT & PLAN NOTE
Patient with confusion and somnolence at [facility] - resolved upon transfer to [facility] - ??related to fever or could this be baseline cognition related or medications?  Recently diagnosed with Mild cognitive Impairment 09/18/2018 - started on Namenda.

## 2018-10-08 ENCOUNTER — TELEPHONE (OUTPATIENT)
Dept: MEDSURG UNIT | Facility: HOSPITAL | Age: 73
End: 2018-10-08

## 2018-10-09 ENCOUNTER — OFFICE VISIT (OUTPATIENT)
Dept: PODIATRY | Facility: CLINIC | Age: 73
End: 2018-10-09
Payer: MEDICARE

## 2018-10-09 VITALS
WEIGHT: 291.69 LBS | HEART RATE: 55 BPM | SYSTOLIC BLOOD PRESSURE: 99 MMHG | HEIGHT: 69 IN | BODY MASS INDEX: 43.2 KG/M2 | DIASTOLIC BLOOD PRESSURE: 62 MMHG

## 2018-10-09 DIAGNOSIS — I73.9 PERIPHERAL VASCULAR DISEASE: ICD-10-CM

## 2018-10-09 DIAGNOSIS — E11.42 DIABETIC POLYNEUROPATHY ASSOCIATED WITH TYPE 2 DIABETES MELLITUS: ICD-10-CM

## 2018-10-09 DIAGNOSIS — G89.29 CHRONIC LOW BACK PAIN, UNSPECIFIED BACK PAIN LATERALITY, WITH SCIATICA PRESENCE UNSPECIFIED: ICD-10-CM

## 2018-10-09 DIAGNOSIS — L97.921 BILATERAL LEG ULCER, LIMITED TO BREAKDOWN OF SKIN: ICD-10-CM

## 2018-10-09 DIAGNOSIS — M54.5 CHRONIC LOW BACK PAIN, UNSPECIFIED BACK PAIN LATERALITY, WITH SCIATICA PRESENCE UNSPECIFIED: ICD-10-CM

## 2018-10-09 DIAGNOSIS — L97.911 BILATERAL LEG ULCER, LIMITED TO BREAKDOWN OF SKIN: ICD-10-CM

## 2018-10-09 DIAGNOSIS — L97.512 RIGHT FOOT ULCER, WITH FAT LAYER EXPOSED: Primary | ICD-10-CM

## 2018-10-09 DIAGNOSIS — F41.9 ANXIETY: ICD-10-CM

## 2018-10-09 PROCEDURE — 3044F HG A1C LEVEL LT 7.0%: CPT | Mod: CPTII,,, | Performed by: PODIATRIST

## 2018-10-09 PROCEDURE — 3078F DIAST BP <80 MM HG: CPT | Mod: CPTII,,, | Performed by: PODIATRIST

## 2018-10-09 PROCEDURE — 99999 PR PBB SHADOW E&M-EST. PATIENT-LVL III: CPT | Mod: PBBFAC,,, | Performed by: PODIATRIST

## 2018-10-09 PROCEDURE — 1101F PT FALLS ASSESS-DOCD LE1/YR: CPT | Mod: CPTII,,, | Performed by: PODIATRIST

## 2018-10-09 PROCEDURE — 99212 OFFICE O/P EST SF 10 MIN: CPT | Mod: S$PBB,,, | Performed by: PODIATRIST

## 2018-10-09 PROCEDURE — 3074F SYST BP LT 130 MM HG: CPT | Mod: CPTII,,, | Performed by: PODIATRIST

## 2018-10-09 PROCEDURE — 99213 OFFICE O/P EST LOW 20 MIN: CPT | Mod: PBBFAC,PO | Performed by: PODIATRIST

## 2018-10-09 NOTE — TELEPHONE ENCOUNTER
----- Message from Carrie Allen sent at 10/9/2018  8:20 AM CDT -----  Type: Needs Medical Advice    Who Called:  Citlali Massey - daughter  Symptoms (please be specific):  Fell yesterday left hip buldging and hurting  How long has patient had these symptoms:  1 day  Pharmacy name and phone #:  n/a  Best Call Back Number: [phone]  Additional Information: went to [facility], had CT of head,  caller is requesting xray of hip area    Thank you

## 2018-10-09 NOTE — TELEPHONE ENCOUNTER
Pt came into clinic for appt with Dr. Torrez. Stopped by to see if we can place XRAY orders. He fell last night on the left side and hit his head and his hip and fingers are pretty bruised.       Pt is requesting medication refill on xanax and Norco  Last visit: 5/12/18  Last refill: xanax 9/11/18, Norco 9/11/18  Follow Up: 11/26/18

## 2018-10-09 NOTE — PROGRESS NOTES
Subjective:      Patient ID: Richard Bustos is a 73 y.o. male.    Chief Complaint: Foot Ulcer (2 ER visits one for a fall last night and other for dyhydration)    Foot ulcer right foot.  Gradual onset, slowly improving over past several weeks, aggravated by increased weight bearing, shoe gear, pressure.  Woundcare, debridement, offloading improving wound.      New wounds both shins.  Sudden onset in fall last night.  Unchanged since onset, aggravated by increased weight bearing, shoe gear, pressure.  No previous medical treatment.  No self care, dressings.  .     Review of Systems   Constitution: Negative for chills, diaphoresis, fever, malaise/fatigue and night sweats.   Cardiovascular: Negative for claudication, cyanosis, leg swelling and syncope.   Skin: Positive for poor wound healing. Negative for color change, dry skin, nail changes, rash, suspicious lesions and unusual hair distribution.   Musculoskeletal: Negative for falls, joint pain, joint swelling, muscle cramps, muscle weakness and stiffness.   Gastrointestinal: Negative for constipation, diarrhea, nausea and vomiting.   Neurological: Positive for sensory change. Negative for brief paralysis, disturbances in coordination, focal weakness, numbness, paresthesias and tremors.           Objective:      Physical Exam   Constitutional: He is oriented to person, place, and time. He appears well-developed and well-nourished. He is cooperative. No distress.   Cardiovascular:   Pulses:       Popliteal pulses are 2+ on the right side, and 2+ on the left side.        Dorsalis pedis pulses are 1+ on the right side, and 1+ on the left side.        Posterior tibial pulses are 1+ on the right side, and 1+ on the left side.   Capillary refill 3 seconds all toes/distal feet, all toes/both feet warm to touch.      Negative lymphadenopathy bilateral popliteal fossa and tarsal tunnel.      Negavie lower extremity edema bilateral.     Musculoskeletal:        Right ankle: He  exhibits normal range of motion, no swelling, no ecchymosis, no deformity, no laceration and normal pulse. Achilles tendon normal. Achilles tendon exhibits no pain, no defect and normal Chung's test results.   Patient has hammertoes of digits  2-5 bilateral                 partially reducible without symptom today.     Lymphadenopathy: No inguinal adenopathy noted on the right or left side.   Negative lymphadenopathy bilateral popliteal fossa and tarsal tunnel.    Negative lymphangitic streaking bilateral feet/ankles/legs.   Neurological: He is alert and oriented to person, place, and time. He has normal strength. He displays no atrophy and no tremor. A sensory deficit is present. He exhibits normal muscle tone. Gait normal.   Reflex Scores:       Patellar reflexes are 2+ on the right side and 2+ on the left side.       Achilles reflexes are 2+ on the right side and 2+ on the left side.  Diminished/loss of protective sensation all toes bilateral to 10 gram monofilament.     Skin: Skin is warm, dry and intact. Capillary refill takes 2 to 3 seconds. No abrasion, no bruising, no burn, no ecchymosis, no laceration, no lesion and no rash noted. He is not diaphoretic. No cyanosis or erythema. No pallor. Nails show no clubbing.     Superficial ulcers both anterior tibial surfaces today clean, pink, granular, and to, not through dermis  without ulceration, drainage, pus, tracking, fluctuance, malodor, or cardinal signs infection.     Wound:  Plantar right 1st mtpj    Size:    Pre:[id number]      Base:  Granular, pink with moderate serous/serosanaguinous drainage only.  No pus, tracking, fluctuance, malodor, or cardinal signs infection.    Borders:  Hyperkeratotic, changing to flat, pink, blanchable skin edges without undermining.     Psychiatric: He has a normal mood and affect.             Assessment:       Encounter Diagnoses   Name Primary?    Right foot ulcer, with fat layer exposed Yes    Bilateral leg ulcer, limited  to breakdown of skin     Diabetic polyneuropathy associated with type 2 diabetes mellitus     Peripheral vascular disease          Plan:       Richard was seen today for foot ulcer.    Diagnoses and all orders for this visit:    Right foot ulcer, with fat layer exposed    Bilateral leg ulcer, limited to breakdown of skin    Diabetic polyneuropathy associated with type 2 diabetes mellitus    Peripheral vascular disease      I counseled the patient on his conditions, their implications and medical management.        Dressed all wounds with wound foam, kerlix.  - change same every other day.    Tubigrip to both legs to knee 12 hours daily.    Continue minimal ambulation right in sx shoe and left DM shoe/insert.    Adequate vitamin supplementation, protein intake, and hydration - discussed with patient.            Follow-up in about 1 week (around 10/16/2018).

## 2018-10-10 ENCOUNTER — TELEPHONE (OUTPATIENT)
Dept: FAMILY MEDICINE | Facility: CLINIC | Age: 73
End: 2018-10-10

## 2018-10-10 DIAGNOSIS — M54.5 CHRONIC LOW BACK PAIN, UNSPECIFIED BACK PAIN LATERALITY, WITH SCIATICA PRESENCE UNSPECIFIED: ICD-10-CM

## 2018-10-10 DIAGNOSIS — G89.29 CHRONIC LOW BACK PAIN, UNSPECIFIED BACK PAIN LATERALITY, WITH SCIATICA PRESENCE UNSPECIFIED: ICD-10-CM

## 2018-10-10 DIAGNOSIS — F41.9 ANXIETY: ICD-10-CM

## 2018-10-10 RX ORDER — HYDROCODONE BITARTRATE AND ACETAMINOPHEN 7.5; 325 MG/1; MG/1
1 TABLET ORAL EVERY 6 HOURS PRN
Qty: 90 TABLET | Refills: 0 | Status: CANCELLED | OUTPATIENT
Start: 2018-10-10

## 2018-10-10 RX ORDER — HYDROCODONE BITARTRATE AND ACETAMINOPHEN 7.5; 325 MG/1; MG/1
1 TABLET ORAL EVERY 6 HOURS PRN
Qty: 90 TABLET | Refills: 0 | OUTPATIENT
Start: 2018-10-10

## 2018-10-10 RX ORDER — ALPRAZOLAM 1 MG/1
TABLET ORAL
Qty: 30 TABLET | Refills: 0 | Status: CANCELLED | OUTPATIENT
Start: 2018-10-10

## 2018-10-10 RX ORDER — ALPRAZOLAM 1 MG/1
TABLET ORAL
Qty: 30 TABLET | Refills: 0 | OUTPATIENT
Start: 2018-10-10

## 2018-10-10 NOTE — TELEPHONE ENCOUNTER
FOV: 11/26/18    LOV: 8/13/18    LR: Xanax, Hydrocodone 9/11/18    Note:   Contract 5/12/18 /Tox 10/6/18

## 2018-10-10 NOTE — TELEPHONE ENCOUNTER
Spoke with daughter Citlali. Dad had fallen and gone to the ER on the 8th. Issue addressed of head involvement only. The next day patient saw Dr. Torrez, it was brought to his attention that his hands were swollen and he had a large hematoma to his hip. He told them that the primary would have to order the xrays in question. Please advise

## 2018-10-10 NOTE — TELEPHONE ENCOUNTER
Patient is here for a fall and injuries and you want to refill xanax and norco?  Absolutely not.  This is not appropriate.  Xanax alone is contraindicated for his age due to HIGH RISK OF FALLS.  Use of xanax and norco together imparts high risk of respiratory depression and death.  Sorry, no can do.

## 2018-10-10 NOTE — TELEPHONE ENCOUNTER
----- Message from Alexsandra Swan sent at 10/10/2018  8:40 AM CDT -----  Contact: Daughter Citlali [phone]  Please call her regarding her request of yesterday for xrays of his left hand and left hip.  Thank you!

## 2018-10-10 NOTE — TELEPHONE ENCOUNTER
----- Message from Sheyla Savannah sent at 10/10/2018 10:38 AM CDT -----  Contact: Citlali  Type:  RX Refill Request    Who Called:  daughter  Refill or New Rx:  Refill  RX Name and Strength:    1. HYDROcodone-acetaminophen (NORCO) 7.5-325 mg per tablet  2. ALPRAZolam (XANAX) 1 MG tablet  How is the patient currently taking it? (ex. 1XDay):    1. Take 1 tablet by mouth every 6 (six) hours as needed for Pain  2. TAKE 1 TABLET(1 MG) BY MOUTH EVERY EVENING  Is this a 30 day or 90 day RX:  30  Preferred Pharmacy with phone number:    [id number] [address] AT [facility] [place] [address]  Phone: [phone] Fax: [phone]  Local or Mail Order:  Local   Ordering Provider:  Dr Familia Ramirez  Best Call Back Number:  [phone]  Additional Information:  Will be out of medication tomorrow 10/11/18. Thanks!

## 2018-10-10 NOTE — TELEPHONE ENCOUNTER
----- Message from Elsa Pacheco sent at 10/10/2018  1:18 PM CDT -----  Type: Needs Medical Advice    Who Called: daughterMarjorie Frye    Best Call Back Number:[phone] (home)     Additional Information: Stating still waiting on a response for left hip and hand x-ray and medication refills

## 2018-10-11 ENCOUNTER — TELEPHONE (OUTPATIENT)
Dept: FAMILY MEDICINE | Facility: CLINIC | Age: 73
End: 2018-10-11

## 2018-10-11 ENCOUNTER — HOSPITAL ENCOUNTER (OUTPATIENT)
Dept: RADIOLOGY | Facility: HOSPITAL | Age: 73
Discharge: HOME | End: 2018-10-11
Attending: ORTHOPAEDIC SURGERY
Payer: MEDICARE

## 2018-10-11 ENCOUNTER — OFFICE VISIT (OUTPATIENT)
Dept: ORTHOPEDICS | Facility: CLINIC | Age: 73
End: 2018-10-11
Payer: MEDICARE

## 2018-10-11 VITALS
HEART RATE: 57 BPM | DIASTOLIC BLOOD PRESSURE: 58 MMHG | WEIGHT: 291 LBS | HEIGHT: 69 IN | SYSTOLIC BLOOD PRESSURE: 122 MMHG | BODY MASS INDEX: 43.1 KG/M2

## 2018-10-11 DIAGNOSIS — S70.02XA CONTUSION OF LEFT HIP, INITIAL ENCOUNTER: Primary | ICD-10-CM

## 2018-10-11 DIAGNOSIS — M25.552 LEFT HIP PAIN: ICD-10-CM

## 2018-10-11 DIAGNOSIS — S62.345A CLOSED NONDISPLACED FRACTURE OF BASE OF FOURTH METACARPAL BONE OF LEFT HAND, INITIAL ENCOUNTER: ICD-10-CM

## 2018-10-11 DIAGNOSIS — M79.642 LEFT HAND PAIN: Primary | ICD-10-CM

## 2018-10-11 DIAGNOSIS — M79.642 LEFT HAND PAIN: ICD-10-CM

## 2018-10-11 DIAGNOSIS — M25.552 LEFT HIP PAIN: Primary | ICD-10-CM

## 2018-10-11 DIAGNOSIS — G47.33 OBSTRUCTIVE SLEEP APNEA SYNDROME: Primary | Chronic | ICD-10-CM

## 2018-10-11 PROCEDURE — 26600 TREAT METACARPAL FRACTURE: CPT | Mod: PBBFAC,PN | Performed by: ORTHOPAEDIC SURGERY

## 2018-10-11 PROCEDURE — 99213 OFFICE O/P EST LOW 20 MIN: CPT | Mod: PBBFAC,25,PN | Performed by: ORTHOPAEDIC SURGERY

## 2018-10-11 PROCEDURE — 73502 X-RAY EXAM HIP UNI 2-3 VIEWS: CPT | Mod: TC,PN,LT

## 2018-10-11 PROCEDURE — 73130 X-RAY EXAM OF HAND: CPT | Mod: TC,PN,LT

## 2018-10-11 PROCEDURE — 97760 ORTHOTIC MGMT&TRAING 1ST ENC: CPT | Mod: PBBFAC,PN | Performed by: ORTHOPAEDIC SURGERY

## 2018-10-11 PROCEDURE — 73502 X-RAY EXAM HIP UNI 2-3 VIEWS: CPT | Mod: 26,LT,, | Performed by: RADIOLOGY

## 2018-10-11 PROCEDURE — 26600 TREAT METACARPAL FRACTURE: CPT | Mod: S$GLB,,, | Performed by: ORTHOPAEDIC SURGERY

## 2018-10-11 PROCEDURE — 99214 OFFICE O/P EST MOD 30 MIN: CPT | Mod: 57,S$GLB,, | Performed by: ORTHOPAEDIC SURGERY

## 2018-10-11 PROCEDURE — 99999 PR PBB SHADOW E&M-EST. PATIENT-LVL III: CPT | Mod: PBBFAC,,, | Performed by: ORTHOPAEDIC SURGERY

## 2018-10-11 PROCEDURE — 73130 X-RAY EXAM OF HAND: CPT | Mod: 26,LT,, | Performed by: RADIOLOGY

## 2018-10-11 RX ORDER — ALPRAZOLAM 1 MG/1
TABLET ORAL
Qty: 30 TABLET | Refills: 0 | Status: SHIPPED | OUTPATIENT
Start: 2018-10-11 | End: 2018-11-12 | Stop reason: SDUPTHER

## 2018-10-11 RX ORDER — HYDROCODONE BITARTRATE AND ACETAMINOPHEN 7.5; 325 MG/1; MG/1
1 TABLET ORAL EVERY 6 HOURS PRN
Qty: 90 TABLET | Refills: 0 | Status: SHIPPED | OUTPATIENT
Start: 2018-10-11 | End: 2018-11-12 | Stop reason: SDUPTHER

## 2018-10-11 NOTE — TELEPHONE ENCOUNTER
----- Message from Anisha Beltran sent at 10/10/2018  1:20 PM CDT -----  Contact: Deb with [facility]  Deb is requesting a new C-Pap mask for the patient because his is broken and they need an order for this.  Call Back#[phone]  Fax#[phone]  Thanks

## 2018-10-11 NOTE — TELEPHONE ENCOUNTER
Called pts daughter regarding below message. Informed pt of refill being sent it. Pts daughter states his hand is broken went to doctor Rhett to confirm.  Pt verbalized understanding with no further questions.

## 2018-10-11 NOTE — TELEPHONE ENCOUNTER
----- Message from Kylee Quinn sent at 10/11/2018  4:42 PM CDT -----  Contact: daughter lisa ph#[phone]  daughter lisa ph#[phone]    Patient requesting a refill on xanax and norco.    Patient will be using   [facility] [id number] AT [facility] [address] [place] [address]  Phone: [phone] Fax: [phone]

## 2018-10-11 NOTE — PROGRESS NOTES
Past Medical History:   Diagnosis Date    Anemia     Anemia of other chronic disease 9/13/2017    Anemia, chronic renal failure 9/13/2017    Anemia, unspecified 9/13/2017    Anticoagulant long-term use     Aorta aneurysm     Arthritis     Atrial fibrillation     CAD (coronary artery disease)     CHF (congestive heart failure)     Chronic kidney disease     Clotting disorder 9/13/2017    Colon polyp     Diabetes mellitus     not on meds    Diabetes mellitus type II     Diverticulosis     Elevated PSA     Encounter for blood transfusion     Former smoker     GERD (gastroesophageal reflux disease)     Gout     Hx of colonic polyps     Hypercoagulable state     Hyperlipidemia     Hypertension     MI, old 2010    Myocardial infarction     9/2010    Osteomyelitis of ankle or foot 3/9/2017    Prostate cancer 06/2016    Sleep apnea     Squamous cell carcinoma 01/23/2018    Left helix, imiquimod    Venous stasis     Chronic       Past Surgical History:   Procedure Laterality Date    ABDOMINAL SURGERY      BLOCK-NERVE-MEDIAL BRANCH-LUMBAR Bilateral 7/13/2017    [facility] at [facility]    BLOCK-NERVE-MEDIAL BRANCH-LUMBAR Bilateral 6/22/2017    [facility] at [facility] OR    CARDIAC SURGERY      COLONOSCOPY  02/2011    diverticulosis with diverticula with clot, no active bleeding    COLONOSCOPY N/A 8/24/2016    Procedure: COLONOSCOPY;  Surgeon: Saroj Borjas MD;  [facility];  Service: Endoscopy;  Laterality: N/A;    COLONOSCOPY N/A 8/24/2016    [facility]    CYSTOSCOPY N/A 5/23/2016    Per[facility] Adeline Moss [facility] at [facility] OR    GASTRIC BYPASS  2/5/2008    IVC FILTER RETRIEVAL      JOINT REPLACEMENT  1996 and 2001    bi-lat hip replacement/Rt Hip and Lt Hip    RADIOFREQUENCY THERMOCOAGULATION (RFTC)-NERVE-MEDIAN BRANCH-LUMBAR Bilateral 8/3/2017    Per[facility] Nile [facility] at [facility] OR    ROTATOR  CUFF REPAIR      Rt shoulder    Stents  8/18/2010    x 3    TRANSRECTAL ULTRASOUND GUIDED PROSTATE BIOPSY N/A 8/1/2016    [facility]    TRANSRECTAL ULTRASOUND GUIDED PROSTATE BIOPSY N/A 5/23/2016    Per[facility] at [facility] OR    UPPER GASTROINTESTINAL ENDOSCOPY  02/2011       Current Outpatient Medications   Medication Sig    alendronate-vitamin D3 (FOSAMAX PLUS D) 70 mg- 2,800 unit per tablet Take 1 tablet by mouth every 7 days.    allopurinol (ZYLOPRIM) 100 MG tablet TAKE 1 TABLET(100 MG) BY MOUTH EVERY DAY    ALPRAZolam (XANAX) 1 MG tablet TAKE 1 TABLET(1 MG) BY MOUTH EVERY EVENING    aspirin (ECOTRIN) 81 MG EC tablet Take 81 mg by mouth once daily.    atorvastatin (LIPITOR) 80 MG tablet TAKE 1 TABLET(80 MG) BY MOUTH EVERY DAY    calcitRIOL (ROCALTROL) 0.5 MCG Cap TK 1 C PO D    calcium carbonate (TUMS ULTRA) 400 mg calcium (1,000 mg) Chew Take 1 tablet (400 mg total) by mouth once daily.    carvedilol (COREG) 25 MG tablet TAKE 1 TABLET BY MOUTH TWICE DAILY WITH MEALS    carvedilol (COREG) 25 MG tablet TAKE 1 TABLET BY MOUTH TWICE DAILY WITH MEALS    cefUROXime (CEFTIN) 250 MG tablet Take 1 tablet (250 mg total) by mouth 2 (two) times daily.    ergocalciferol (ERGOCALCIFEROL) 50,000 unit Cap TK 1 C PO Q WEEK    escitalopram oxalate (LEXAPRO) 10 MG tablet TAKE 1/2 DAILY FOR FIRST 7 DAYS THEN THEREAFTER TAKE 1 TABLET BY MOUTH DAILY    ferrous sulfate 325 mg (65 mg iron) Tab tablet TAKE 1 TABLET BY MOUTH TWICE DAILY    fluticasone (FLONASE) 50 mcg/actuation nasal spray 2 sprays by Each Nare route once daily.    FOLIC ACID/MULTIVIT-MIN/LUTEIN (CENTRUM SILVER ORAL) Take 1 tablet by mouth once daily.    furosemide (LASIX) 40 MG tablet TAKE 1 TABLET BY MOUTH DAILY AS NEEDED    HYDROcodone-acetaminophen (NORCO) 7.5-325 mg per tablet Take 1 tablet by mouth every 6 (six) hours as needed for Pain.    influenza (FLUZONE HIGH-DOSE) 180  mcg/0.5 mL vaccine Inject into the muscle.    L-METHYL-B6-B12 3-35-2 mg Tab TAKE 1 TABLET BY MOUTH TWICE DAILY    lisinopril (PRINIVIL,ZESTRIL) 40 MG tablet TAKE 1 TABLET BY MOUTH EVERY EVENING    MAGOX 400 mg tablet TAKE 1 TABLET BY MOUTH EVERY DAY    memantine (NAMENDA) 5 MG Tab Take 1 tablet (5 mg total) by mouth 2 (two) times daily.    mirabegron (MYRBETRIQ) 50 mg Tb24 Take 1 tablet (50 mg total) by mouth once daily.    nitroGLYCERIN 0.4 MG/DOSE TL SPRY (NITROLINGUAL) 400 mcg/spray spray PLACE 1 SPRAY UNDER THE TONGUE EVERY 5 MINUTES AS NEEDED FOR CHEST PAIN    omeprazole (PRILOSEC) 20 MG capsule TAKE 1 CAPSULE(20 MG) BY MOUTH EVERY DAY    ranitidine (ZANTAC) 150 MG tablet TAKE 1 TABLET(150 MG) BY MOUTH TWICE DAILY    triamcinolone acetonide 0.1% (KENALOG) 0.1 % cream AAA bid    vit C-vit E-lutein-min-om-3 (OCUVITE) 550-17-4-150 mg-unit-mg-mg Cap Take 1 capsule by mouth once daily.    warfarin (COUMADIN) 5 MG tablet TAKE 1 TABLET BY MOUTH EVERY DAY (Patient taking differently: TAKE 1 TABLET BY MOUTH EVERY DAY EXCEPT 1 1/2 TABLETS ON WEDNESDAY)    hydrocortisone 2.5 % cream Apply topically 2 (two) times daily. for 10 days    ipratropium (ATROVENT) 0.02 % nebulizer solution Take 2.5 mLs (500 mcg total) by nebulization 4 (four) times daily. Rescue     No current facility-administered medications for this visit.        Review of patient's allergies indicates:   Allergen Reactions    Shellfish containing products Other (See Comments)     Other reaction(s): Gout       Family History   Problem Relation Age of Onset    Heart attack Mother     Heart attack Father     Heart attack Brother     Ulcerative colitis Daughter 35    Lupus Daughter     Colon cancer Neg Hx     Colon polyps Neg Hx     Crohn's disease Neg Hx     Melanoma Neg Hx     Psoriasis Neg Hx     Eczema Neg Hx        Social History     Socioeconomic History    Marital status:      Spouse name: Not on file    Number of  children: Not on file    Years of education: Not on file    Highest education level: Not on file   Social Needs    Financial resource strain: Not on file    Food insecurity - worry: Not on file    Food insecurity - inability: Not on file    Transportation needs - medical: Not on file    Transportation needs - non-medical: Not on file   Occupational History    Not on file   Tobacco Use    Smoking status: Former Smoker    Smokeless tobacco: Never Used    Tobacco comment: 45 yrs ago, only smoked 3-4 packs in his entire life as a teenager   Substance and Sexual Activity    Alcohol use: Yes     Comment: rare    Drug use: No    Sexual activity: Not on file   Other Topics Concern    Not on file   Social History Narrative    Not on file       Chief Complaint:   Chief Complaint   Patient presents with    Hip Pain     left hip pain       History of present illness:  72-year-old morbidly obese male who was in a wheelchair comes in for left hand and hip pain. Patient fell on October 14, 2018.  Has a swollen and bruised left hand as well as a large hematoma on the left hip. Patient was on blood thinners.  Pain is 6/10.      Review of Systems:  Musculoskeletal:  See HPI      Physical Examination:    Vital Signs:  There were no vitals filed for this visit.    Body mass index is 42.97 kg/m².    This a well-developed, well nourished patient in no acute distress.  They are alert and oriented and cooperative to examination.  Pt.  Comes in in a wheelchair    Examination of his left hand shows moderate bruising and swelling.  Tenderness over the 4th metacarpal.  Difficulty making a fist.  Intact light touch sensation and brisk capillary refill in all digits.        X-rays:  X-rays of the left hand are ordered and reviewed which show a nondisplaced fracture near the base of the 4th metacarpal.  Some scapholunate widening.  X-rays of left hip is ordered and reviewed which show no acute fracture.  Patient has bilateral hip  replacements with some heterotopic ossification     Assessment::  Left 4th metacarpal fracture  Left hip contusion    Plan:  I reviewed the finding with him today.  Recommended a fracture brace for his hand.  Recommended heat compresses for his legs.  Follow up in 4 weeks with another x-ray of the left hand.    We performed a custom orthotic/brace fitting, adjusting and training with the patient. The patient demonstrated understanding and proper care. This was performed for 15 minutes.          This note was created using [employer] voice recognition software that occasionally misinterpreted phrases or words.    Consult note is delivered via Epic messaging service.

## 2018-10-12 PROBLEM — S62.345A CLOSED NONDISPLACED FRACTURE OF BASE OF FOURTH METACARPAL BONE OF LEFT HAND: Status: ACTIVE | Noted: 2018-10-12

## 2018-10-15 RX ORDER — MECOBAL/LEVOMEFOLAT CA/B6 PHOS 2-3-35 MG
TABLET ORAL
Qty: 180 TABLET | Refills: 3 | Status: SHIPPED | OUTPATIENT
Start: 2018-10-15 | End: 2019-01-15 | Stop reason: SDUPTHER

## 2018-10-16 ENCOUNTER — LAB VISIT (OUTPATIENT)
Dept: LAB | Facility: HOSPITAL | Age: 73
End: 2018-10-16
Attending: UROLOGY
Payer: MEDICARE

## 2018-10-16 ENCOUNTER — OFFICE VISIT (OUTPATIENT)
Dept: PODIATRY | Facility: CLINIC | Age: 73
End: 2018-10-16
Payer: MEDICARE

## 2018-10-16 VITALS
BODY MASS INDEX: 44.02 KG/M2 | HEART RATE: 58 BPM | SYSTOLIC BLOOD PRESSURE: 154 MMHG | HEIGHT: 69 IN | WEIGHT: 297.19 LBS | DIASTOLIC BLOOD PRESSURE: 78 MMHG

## 2018-10-16 DIAGNOSIS — I73.9 PERIPHERAL VASCULAR DISEASE: ICD-10-CM

## 2018-10-16 DIAGNOSIS — L97.911 BILATERAL LEG ULCER, LIMITED TO BREAKDOWN OF SKIN: ICD-10-CM

## 2018-10-16 DIAGNOSIS — N32.81 OVERACTIVE BLADDER: ICD-10-CM

## 2018-10-16 DIAGNOSIS — L97.512 RIGHT FOOT ULCER, WITH FAT LAYER EXPOSED: Primary | ICD-10-CM

## 2018-10-16 DIAGNOSIS — L97.921 BILATERAL LEG ULCER, LIMITED TO BREAKDOWN OF SKIN: ICD-10-CM

## 2018-10-16 DIAGNOSIS — E11.42 DIABETIC POLYNEUROPATHY ASSOCIATED WITH TYPE 2 DIABETES MELLITUS: ICD-10-CM

## 2018-10-16 DIAGNOSIS — C61 PROSTATE CANCER: ICD-10-CM

## 2018-10-16 LAB — COMPLEXED PSA SERPL-MCNC: <0.01 NG/ML

## 2018-10-16 PROCEDURE — 11042 DBRDMT SUBQ TIS 1ST 20SQCM/<: CPT | Mod: PBBFAC,PO | Performed by: PODIATRIST

## 2018-10-16 PROCEDURE — 99213 OFFICE O/P EST LOW 20 MIN: CPT | Mod: PBBFAC,PO | Performed by: PODIATRIST

## 2018-10-16 PROCEDURE — 11042 DBRDMT SUBQ TIS 1ST 20SQCM/<: CPT | Mod: S$GLB,ICN,, | Performed by: PODIATRIST

## 2018-10-16 PROCEDURE — 84153 ASSAY OF PSA TOTAL: CPT

## 2018-10-16 PROCEDURE — 99212 OFFICE O/P EST SF 10 MIN: CPT | Mod: 25,S$GLB,ICN, | Performed by: PODIATRIST

## 2018-10-16 PROCEDURE — 99999 PR PBB SHADOW E&M-EST. PATIENT-LVL III: CPT | Mod: PBBFAC,,, | Performed by: PODIATRIST

## 2018-10-16 PROCEDURE — 36415 COLL VENOUS BLD VENIPUNCTURE: CPT | Mod: PO

## 2018-10-16 RX ORDER — ESCITALOPRAM OXALATE 10 MG/1
10 TABLET ORAL DAILY
Qty: 30 TABLET | Refills: 1 | Status: SHIPPED | OUTPATIENT
Start: 2018-10-16 | End: 2018-10-16 | Stop reason: SDUPTHER

## 2018-10-16 RX ORDER — ESCITALOPRAM OXALATE 10 MG/1
TABLET ORAL
Qty: 90 TABLET | Refills: 1 | Status: SHIPPED | OUTPATIENT
Start: 2018-10-16 | End: 2019-02-11

## 2018-10-16 NOTE — PROGRESS NOTES
Subjective:      Patient ID: Richard Bustos is a 73 y.o. male.    Chief Complaint: Foot Ulcer (renu)    Foot ulcer right foot.  Gradual onset, slowly improving over past several weeks, aggravated by increased weight bearing, shoe gear, pressure.  Woundcare, debridement, offloading improving wound.      New wounds both shins.  Sudden onset in fall last night.  Unchanged since onset, aggravated by increased weight bearing, shoe gear, pressure.  No previous medical treatment.  No self care, dressings.  .     Review of Systems   Constitution: Negative for chills, diaphoresis, fever, malaise/fatigue and night sweats.   Cardiovascular: Negative for claudication, cyanosis, leg swelling and syncope.   Skin: Positive for poor wound healing. Negative for color change, dry skin, nail changes, rash, suspicious lesions and unusual hair distribution.   Musculoskeletal: Negative for falls, joint pain, joint swelling, muscle cramps, muscle weakness and stiffness.   Gastrointestinal: Negative for constipation, diarrhea, nausea and vomiting.   Neurological: Positive for sensory change. Negative for brief paralysis, disturbances in coordination, focal weakness, numbness, paresthesias and tremors.           Objective:      Physical Exam   Constitutional: He is oriented to person, place, and time. He appears well-developed and well-nourished. He is cooperative. No distress.   Cardiovascular:   Pulses:       Popliteal pulses are 2+ on the right side, and 2+ on the left side.        Dorsalis pedis pulses are 1+ on the right side, and 1+ on the left side.        Posterior tibial pulses are 1+ on the right side, and 1+ on the left side.   Capillary refill 3 seconds all toes/distal feet, all toes/both feet warm to touch.      Negative lymphadenopathy bilateral popliteal fossa and tarsal tunnel.      Negavie lower extremity edema bilateral.     Musculoskeletal:        Right ankle: He exhibits normal range of motion, no swelling, no ecchymosis,  no deformity, no laceration and normal pulse. Achilles tendon normal. Achilles tendon exhibits no pain, no defect and normal Chung's test results.   Patient has hammertoes of digits  2-5 bilateral                 partially reducible without symptom today.     Lymphadenopathy: No inguinal adenopathy noted on the right or left side.   Negative lymphadenopathy bilateral popliteal fossa and tarsal tunnel.    Negative lymphangitic streaking bilateral feet/ankles/legs.   Neurological: He is alert and oriented to person, place, and time. He has normal strength. He displays no atrophy and no tremor. A sensory deficit is present. He exhibits normal muscle tone. Gait normal.   Reflex Scores:       Patellar reflexes are 2+ on the right side and 2+ on the left side.       Achilles reflexes are 2+ on the right side and 2+ on the left side.  Diminished/loss of protective sensation all toes bilateral to 10 gram monofilament.     Skin: Skin is warm, dry and intact. Capillary refill takes 2 to 3 seconds. No abrasion, no bruising, no burn, no ecchymosis, no laceration, no lesion and no rash noted. He is not diaphoretic. No cyanosis or erythema. No pallor. Nails show no clubbing.     Superficial ulcers both anterior tibial surfaces today clean, pink, granular, and to, not through dermis  without ulceration, drainage, pus, tracking, fluctuance, malodor, or cardinal signs infection.     Wound:  Plantar right 1st mtpj    Size:    Pre:[id number]  Post: [id number]    Base:  Granular, pink with moderate serous/serosanaguinous drainage only.  No pus, tracking, fluctuance, malodor, or cardinal signs infection.    Borders:  Hyperkeratotic, changing to flat, pink, blanchable skin edges without undermining.     Psychiatric: He has a normal mood and affect.             Assessment:       Encounter Diagnoses   Name Primary?    Right foot ulcer, with fat layer exposed Yes    Bilateral leg ulcer, limited to breakdown of skin     Diabetic  polyneuropathy associated with type 2 diabetes mellitus     Peripheral vascular disease          Plan:       Richard was seen today for foot ulcer.    Diagnoses and all orders for this visit:    Right foot ulcer, with fat layer exposed    Bilateral leg ulcer, limited to breakdown of skin    Diabetic polyneuropathy associated with type 2 diabetes mellitus    Peripheral vascular disease      I counseled the patient on his conditions, their implications and medical management.    Debrided wound right 1st mtpj.    Dressed all wounds with wound foam, kerlix.  - change same every other day.    Tubigrip to both legs to knee 12 hours daily.    Continue minimal ambulation right in sx shoe and left DM shoe/insert.    Adequate vitamin supplementation, protein intake, and hydration - discussed with patient.            Follow-up in about 2 weeks (around 10/30/2018).

## 2018-10-16 NOTE — PROCEDURES
"Wound Debridement  Date/Time: 10/16/2018 11:28 AM  Performed by: Iván Torrez DPM  Authorized by: Iván Torrez DPM     Time out: Immediately prior to procedure a "time out" was called to verify the correct patient, procedure, equipment, support staff and site/side marked as required.    Consent Done?:  Yes (Verbal)  Local anesthesia used?: No      Wound Details:    Location:  Right foot    Location:  Right 1st Metatarsal Head    Type of Debridement:  Excisional       Length (cm):  0.7       Area (sq cm):  0.35       Width (cm):  0.5       Percent Debrided (%):  100       Depth (cm):  0.2       Total Area Debrided (sq cm):  0.35    Depth of debridement:  Subcutaneous tissue    Tissue debrided:  Dermis, Epidermis and Subcutaneous    Devitalized tissue debrided:  Biofilm and Callus    Instruments:  Blade    Bleeding:  Minimal  Hemostasis Achieved: Yes    Method Used:  Pressure  Patient tolerance:  Patient tolerated the procedure well with no immediate complications      "

## 2018-10-23 ENCOUNTER — OFFICE VISIT (OUTPATIENT)
Dept: PODIATRY | Facility: CLINIC | Age: 73
End: 2018-10-23
Payer: MEDICARE

## 2018-10-23 VITALS
BODY MASS INDEX: 42.17 KG/M2 | HEIGHT: 70 IN | RESPIRATION RATE: 14 BRPM | SYSTOLIC BLOOD PRESSURE: 129 MMHG | WEIGHT: 294.56 LBS | HEART RATE: 67 BPM | DIASTOLIC BLOOD PRESSURE: 75 MMHG

## 2018-10-23 DIAGNOSIS — I73.9 PERIPHERAL VASCULAR DISEASE: ICD-10-CM

## 2018-10-23 DIAGNOSIS — E11.42 DIABETIC POLYNEUROPATHY ASSOCIATED WITH TYPE 2 DIABETES MELLITUS: ICD-10-CM

## 2018-10-23 DIAGNOSIS — L97.911 BILATERAL LEG ULCER, LIMITED TO BREAKDOWN OF SKIN: ICD-10-CM

## 2018-10-23 DIAGNOSIS — L97.921 BILATERAL LEG ULCER, LIMITED TO BREAKDOWN OF SKIN: ICD-10-CM

## 2018-10-23 DIAGNOSIS — L97.512 RIGHT FOOT ULCER, WITH FAT LAYER EXPOSED: Primary | ICD-10-CM

## 2018-10-23 PROBLEM — I95.9 HYPOTENSION: Status: RESOLVED | Noted: 2018-10-07 | Resolved: 2018-10-23

## 2018-10-23 PROBLEM — R50.9 FEVER: Status: RESOLVED | Noted: 2018-10-07 | Resolved: 2018-10-23

## 2018-10-23 PROBLEM — K92.2 GIB (GASTROINTESTINAL BLEEDING): Status: RESOLVED | Noted: 2018-09-24 | Resolved: 2018-10-23

## 2018-10-23 PROBLEM — G93.41 METABOLIC ENCEPHALOPATHY: Status: RESOLVED | Noted: 2018-10-07 | Resolved: 2018-10-23

## 2018-10-23 PROCEDURE — 11042 DBRDMT SUBQ TIS 1ST 20SQCM/<: CPT | Mod: PBBFAC,PO | Performed by: PODIATRIST

## 2018-10-23 PROCEDURE — 99999 PR PBB SHADOW E&M-EST. PATIENT-LVL III: CPT | Mod: PBBFAC,,, | Performed by: PODIATRIST

## 2018-10-23 PROCEDURE — 99213 OFFICE O/P EST LOW 20 MIN: CPT | Mod: PBBFAC,PO,25 | Performed by: PODIATRIST

## 2018-10-23 PROCEDURE — 99499 UNLISTED E&M SERVICE: CPT | Mod: S$GLB,,, | Performed by: PODIATRIST

## 2018-10-23 PROCEDURE — 11042 DBRDMT SUBQ TIS 1ST 20SQCM/<: CPT | Mod: S$GLB,,, | Performed by: PODIATRIST

## 2018-10-23 NOTE — PROCEDURES
"Wound Debridement  Date/Time: 10/23/2018 9:46 AM  Performed by: Iván Torrez DPM  Authorized by: Iván Torrez DPM     Time out: Immediately prior to procedure a "time out" was called to verify the correct patient, procedure, equipment, support staff and site/side marked as required.    Consent Done?:  Yes (Verbal)  Local anesthesia used?: No      Wound Details:    Location:  Right foot    Location:  Right 1st Metatarsal Head    Type of Debridement:  Excisional       Length (cm):  0.7       Area (sq cm):  0.28       Width (cm):  0.4       Percent Debrided (%):  100       Depth (cm):  0.2       Total Area Debrided (sq cm):  0.28    Depth of debridement:  Subcutaneous tissue    Tissue debrided:  Dermis, Epidermis and Subcutaneous    Devitalized tissue debrided:  Biofilm and Callus    Instruments:  Blade and Curette    Bleeding:  Minimal  Hemostasis Achieved: Yes    Method Used:  Pressure  Patient tolerance:  Patient tolerated the procedure well with no immediate complications      "

## 2018-10-23 NOTE — PROGRESS NOTES
Subjective:      Patient ID: Richard Bustos is a 73 y.o. male.    Chief Complaint: Foot Ulcer (right foot ulcer )    Foot ulcer right foot.  Gradual onset, slowly improving over past several weeks, aggravated by increased weight bearing, shoe gear, pressure.  Woundcare, debridement, offloading improving wound.  Apertures with mepilex border - not covering wound    wounds both shins.    Covering with mepilex border with good improvement.  Denies signs infection, pain.  .     Review of Systems   Constitution: Negative for chills, diaphoresis, fever, malaise/fatigue and night sweats.   Cardiovascular: Negative for claudication, cyanosis, leg swelling and syncope.   Skin: Positive for poor wound healing. Negative for color change, dry skin, nail changes, rash, suspicious lesions and unusual hair distribution.   Musculoskeletal: Negative for falls, joint pain, joint swelling, muscle cramps, muscle weakness and stiffness.   Gastrointestinal: Negative for constipation, diarrhea, nausea and vomiting.   Neurological: Positive for sensory change. Negative for brief paralysis, disturbances in coordination, focal weakness, numbness, paresthesias and tremors.           Objective:      Physical Exam   Constitutional: He is oriented to person, place, and time. He appears well-developed and well-nourished. He is cooperative. No distress.   Cardiovascular:   Pulses:       Popliteal pulses are 2+ on the right side, and 2+ on the left side.        Dorsalis pedis pulses are 1+ on the right side, and 1+ on the left side.        Posterior tibial pulses are 1+ on the right side, and 1+ on the left side.   Capillary refill 3 seconds all toes/distal feet, all toes/both feet warm to touch.      Negative lymphadenopathy bilateral popliteal fossa and tarsal tunnel.      Negavie lower extremity edema bilateral.     Musculoskeletal:        Right ankle: He exhibits normal range of motion, no swelling, no ecchymosis, no deformity, no laceration and  normal pulse. Achilles tendon normal. Achilles tendon exhibits no pain, no defect and normal Chung's test results.   Patient has hammertoes of digits  2-5 bilateral                 partially reducible without symptom today.     Lymphadenopathy: No inguinal adenopathy noted on the right or left side.   Negative lymphadenopathy bilateral popliteal fossa and tarsal tunnel.    Negative lymphangitic streaking bilateral feet/ankles/legs.   Neurological: He is alert and oriented to person, place, and time. He has normal strength. He displays no atrophy and no tremor. A sensory deficit is present. He exhibits normal muscle tone. Gait normal.   Reflex Scores:       Patellar reflexes are 2+ on the right side and 2+ on the left side.       Achilles reflexes are 2+ on the right side and 2+ on the left side.  Diminished/loss of protective sensation all toes bilateral to 10 gram monofilament.     Skin: Skin is warm, dry and intact. Capillary refill takes 2 to 3 seconds. No abrasion, no bruising, no burn, no ecchymosis, no laceration, no lesion and no rash noted. He is not diaphoretic. No cyanosis or erythema. No pallor. Nails show no clubbing.   Wound anterior shin right closed today, epithelialized  without ulceration, drainage, pus, tracking, fluctuance, malodor, or cardinal signs infection.    Superficial ulcers left anterior tibial surface today clean, pink, granular, and to, not through dermis  without ulceration, drainage, pus, tracking, fluctuance, malodor, or cardinal signs infection.     Wound:  Plantar right 1st mtpj    Size:    Pre:[id number]  Post: [id number]    Base:  Granular, pink with moderate serous/serosanaguinous drainage only.  No pus, tracking, fluctuance, malodor, or cardinal signs infection.    Borders:  Hyperkeratotic, changing to flat, pink, blanchable skin edges without undermining.     Psychiatric: He has a normal mood and affect.             Assessment:       Encounter Diagnoses   Name Primary?    Right  foot ulcer, with fat layer exposed Yes    Diabetic polyneuropathy associated with type 2 diabetes mellitus     Peripheral vascular disease     Bilateral leg ulcer, limited to breakdown of skin          Plan:       Richard was seen today for foot ulcer.    Diagnoses and all orders for this visit:    Right foot ulcer, with fat layer exposed    Diabetic polyneuropathy associated with type 2 diabetes mellitus    Peripheral vascular disease    Bilateral leg ulcer, limited to breakdown of skin      I counseled the patient on his conditions, their implications and medical management.    Debrided wound right 1st mtpj.    Dressed right foot wound with apertures x 2, hydrofera blue, wound foam, kerlix.  - change same every other day.    mepilex border left shin wound every other day changes.    Tubigrip to both legs to knee 12 hours daily.    Continue minimal ambulation right in sx shoe and left DM shoe/insert.    Adequate vitamin supplementation, protein intake, and hydration - discussed with patient.            Follow-up in about 1 week (around 10/30/2018).

## 2018-10-29 RX ORDER — MECOBAL/LEVOMEFOLAT CA/B6 PHOS 2-3-35 MG
TABLET ORAL
Qty: 180 TABLET | Refills: 3 | Status: SHIPPED | OUTPATIENT
Start: 2018-10-29 | End: 2019-01-15 | Stop reason: SDUPTHER

## 2018-10-30 ENCOUNTER — OFFICE VISIT (OUTPATIENT)
Dept: PODIATRY | Facility: CLINIC | Age: 73
End: 2018-10-30
Payer: MEDICARE

## 2018-10-30 VITALS
SYSTOLIC BLOOD PRESSURE: 104 MMHG | HEIGHT: 70 IN | HEART RATE: 63 BPM | DIASTOLIC BLOOD PRESSURE: 52 MMHG | WEIGHT: 294.56 LBS | BODY MASS INDEX: 42.17 KG/M2

## 2018-10-30 DIAGNOSIS — L97.512 RIGHT FOOT ULCER, WITH FAT LAYER EXPOSED: Primary | ICD-10-CM

## 2018-10-30 DIAGNOSIS — I73.9 PERIPHERAL VASCULAR DISEASE: ICD-10-CM

## 2018-10-30 DIAGNOSIS — M79.642 LEFT HAND PAIN: Primary | ICD-10-CM

## 2018-10-30 DIAGNOSIS — E11.42 DIABETIC POLYNEUROPATHY ASSOCIATED WITH TYPE 2 DIABETES MELLITUS: ICD-10-CM

## 2018-10-30 PROCEDURE — 99213 OFFICE O/P EST LOW 20 MIN: CPT | Mod: PBBFAC,PO | Performed by: PODIATRIST

## 2018-10-30 PROCEDURE — 3078F DIAST BP <80 MM HG: CPT | Mod: CPTII,,, | Performed by: PODIATRIST

## 2018-10-30 PROCEDURE — 99999 PR PBB SHADOW E&M-EST. PATIENT-LVL III: CPT | Mod: PBBFAC,,, | Performed by: PODIATRIST

## 2018-10-30 PROCEDURE — 99212 OFFICE O/P EST SF 10 MIN: CPT | Mod: S$PBB,,, | Performed by: PODIATRIST

## 2018-10-30 PROCEDURE — 3044F HG A1C LEVEL LT 7.0%: CPT | Mod: CPTII,,, | Performed by: PODIATRIST

## 2018-10-30 PROCEDURE — 3074F SYST BP LT 130 MM HG: CPT | Mod: CPTII,,, | Performed by: PODIATRIST

## 2018-10-30 PROCEDURE — 1101F PT FALLS ASSESS-DOCD LE1/YR: CPT | Mod: CPTII,,, | Performed by: PODIATRIST

## 2018-10-30 RX ORDER — ESCITALOPRAM OXALATE 10 MG/1
TABLET ORAL
Qty: 30 TABLET | Refills: 0 | OUTPATIENT
Start: 2018-10-30

## 2018-10-30 RX ORDER — ESCITALOPRAM OXALATE 10 MG/1
TABLET ORAL
Qty: 90 TABLET | Refills: 1 | Status: CANCELLED | OUTPATIENT
Start: 2018-10-30

## 2018-10-30 NOTE — TELEPHONE ENCOUNTER
Pt is requesting medication refill on Lexapro  Last visit: 5/12/18  Last refill:  10/16/18  Follow Up: 11/26/18       L-Methyl Was filled on yesterday

## 2018-10-30 NOTE — PROGRESS NOTES
Subjective:      Patient ID: Richard Bustos is a 73 y.o. male.    Chief Complaint: Wound Care    Foot ulcer right foot.  Gradual onset, slowly improving over past several weeks, aggravated by increased weight bearing, shoe gear, pressure.  Woundcare, debridement, offloading improving wound.  Apertures with mepilex border - not covering wound    wounds both shins.    Covering with mepilex border with good improvement.  Denies signs infection, pain.  .     Review of Systems   Constitution: Negative for chills, diaphoresis, fever, malaise/fatigue and night sweats.   Cardiovascular: Negative for claudication, cyanosis, leg swelling and syncope.   Skin: Positive for poor wound healing. Negative for color change, dry skin, nail changes, rash, suspicious lesions and unusual hair distribution.   Musculoskeletal: Negative for falls, joint pain, joint swelling, muscle cramps, muscle weakness and stiffness.   Gastrointestinal: Negative for constipation, diarrhea, nausea and vomiting.   Neurological: Positive for sensory change. Negative for brief paralysis, disturbances in coordination, focal weakness, numbness, paresthesias and tremors.           Objective:      Physical Exam   Constitutional: He is oriented to person, place, and time. He appears well-developed and well-nourished. He is cooperative. No distress.   Cardiovascular:   Pulses:       Popliteal pulses are 2+ on the right side, and 2+ on the left side.        Dorsalis pedis pulses are 1+ on the right side, and 1+ on the left side.        Posterior tibial pulses are 1+ on the right side, and 1+ on the left side.   Capillary refill 3 seconds all toes/distal feet, all toes/both feet warm to touch.      Negative lymphadenopathy bilateral popliteal fossa and tarsal tunnel.      Negavie lower extremity edema bilateral.     Musculoskeletal:        Right ankle: He exhibits normal range of motion, no swelling, no ecchymosis, no deformity, no laceration and normal pulse.  Achilles tendon normal. Achilles tendon exhibits no pain, no defect and normal Chung's test results.   Patient has hammertoes of digits  2-5 bilateral                 partially reducible without symptom today.     Lymphadenopathy: No inguinal adenopathy noted on the right or left side.   Negative lymphadenopathy bilateral popliteal fossa and tarsal tunnel.    Negative lymphangitic streaking bilateral feet/ankles/legs.   Neurological: He is alert and oriented to person, place, and time. He has normal strength. He displays no atrophy and no tremor. A sensory deficit is present. He exhibits normal muscle tone. Gait normal.   Reflex Scores:       Patellar reflexes are 2+ on the right side and 2+ on the left side.       Achilles reflexes are 2+ on the right side and 2+ on the left side.  Diminished/loss of protective sensation all toes bilateral to 10 gram monofilament.     Skin: Skin is warm, dry and intact. Capillary refill takes 2 to 3 seconds. No abrasion, no bruising, no burn, no ecchymosis, no laceration, no lesion and no rash noted. He is not diaphoretic. No cyanosis or erythema. No pallor. Nails show no clubbing.   Wound anterior shin right and left closed today, epithelialized  without ulceration, drainage, pus, tracking, fluctuance, malodor, or cardinal signs infection.        Wound:  Plantar right 1st mtpj    Size:    Pre:[id number]      Base:  Granular, pink with moderate serous/serosanaguinous drainage only.  No pus, tracking, fluctuance, malodor, or cardinal signs infection.    Borders:  Hyperkeratotic, changing to flat, pink, blanchable skin edges without undermining.     Psychiatric: He has a normal mood and affect.             Assessment:       Encounter Diagnoses   Name Primary?    Right foot ulcer, with fat layer exposed Yes    Diabetic polyneuropathy associated with type 2 diabetes mellitus     Peripheral vascular disease          Plan:       Richard was seen today for wound care.    Diagnoses and all  orders for this visit:    Right foot ulcer, with fat layer exposed    Diabetic polyneuropathy associated with type 2 diabetes mellitus    Peripheral vascular disease      I counseled the patient on his conditions, their implications and medical management.    Dressed right foot wound with apertures x 2,  wound foam, kerlix.  - change same every other day.    Tubigrip to both legs to knee 12 hours daily.    Continue minimal ambulation right in sx shoe and left DM shoe/insert.    Adequate vitamin supplementation, protein intake, and hydration - discussed with patient.            Follow-up in about 1 week (around 11/6/2018).

## 2018-11-01 ENCOUNTER — OFFICE VISIT (OUTPATIENT)
Dept: ORTHOPEDICS | Facility: CLINIC | Age: 73
End: 2018-11-01
Payer: MEDICARE

## 2018-11-01 ENCOUNTER — HOSPITAL ENCOUNTER (OUTPATIENT)
Dept: RADIOLOGY | Facility: HOSPITAL | Age: 73
Discharge: HOME | End: 2018-11-01
Attending: ORTHOPAEDIC SURGERY
Payer: MEDICARE

## 2018-11-01 VITALS
WEIGHT: 294 LBS | DIASTOLIC BLOOD PRESSURE: 65 MMHG | HEART RATE: 68 BPM | BODY MASS INDEX: 42.09 KG/M2 | HEIGHT: 70 IN | SYSTOLIC BLOOD PRESSURE: 144 MMHG

## 2018-11-01 DIAGNOSIS — M79.642 LEFT HAND PAIN: ICD-10-CM

## 2018-11-01 DIAGNOSIS — S62.345D CLOSED NONDISPLACED FRACTURE OF BASE OF FOURTH METACARPAL BONE OF LEFT HAND WITH ROUTINE HEALING, SUBSEQUENT ENCOUNTER: Primary | ICD-10-CM

## 2018-11-01 PROCEDURE — 73130 X-RAY EXAM OF HAND: CPT | Mod: TC,PN,LT

## 2018-11-01 PROCEDURE — 99999 PR PBB SHADOW E&M-EST. PATIENT-LVL III: CPT | Mod: PBBFAC,,, | Performed by: ORTHOPAEDIC SURGERY

## 2018-11-01 PROCEDURE — 99024 POSTOP FOLLOW-UP VISIT: CPT | Mod: S$GLB,,, | Performed by: ORTHOPAEDIC SURGERY

## 2018-11-01 PROCEDURE — 99213 OFFICE O/P EST LOW 20 MIN: CPT | Mod: PBBFAC,25,PN | Performed by: ORTHOPAEDIC SURGERY

## 2018-11-01 PROCEDURE — 73130 X-RAY EXAM OF HAND: CPT | Mod: 26,LT,, | Performed by: RADIOLOGY

## 2018-11-01 NOTE — PROGRESS NOTES
Past Medical History:   Diagnosis Date    Anemia     Anemia of other chronic disease 9/13/2017    Anemia, chronic renal failure 9/13/2017    Anemia, unspecified 9/13/2017    Anticoagulant long-term use     Aorta aneurysm     Arthritis     Atrial fibrillation     CAD (coronary artery disease)     CHF (congestive heart failure)     Chronic kidney disease     Clotting disorder 9/13/2017    Colon polyp     Diabetes mellitus     not on meds    Diabetes mellitus type II     Diverticulosis     Elevated PSA     Encounter for blood transfusion     Former smoker     GERD (gastroesophageal reflux disease)     Gout     Hx of colonic polyps     Hypercoagulable state     Hyperlipidemia     Hypertension     MI, old 2010    Myocardial infarction     9/2010    Osteomyelitis of ankle or foot 3/9/2017    Prostate cancer 06/2016    Sleep apnea     Squamous cell carcinoma 01/23/2018    Left helix, imiquimod    Venous stasis     Chronic       Past Surgical History:   Procedure Laterality Date    ABDOMINAL SURGERY      BLOCK-NERVE-MEDIAL BRANCH-LUMBAR Bilateral 7/13/2017    Performed [facility] Nile Causey [facility] at [facility] OR    BLOCK-NERVE-MEDIAL BRANCH-LUMBAR Bilateral 6/22/2017    [facility] Nile [facility] at [facility] OR    CARDIAC SURGERY      COLONOSCOPY  02/2011    diverticulosis with diverticula with clot, no active bleeding    COLONOSCOPY N/A 8/24/2016    Procedure: COLONOSCOPY;  Surgeon: Saroj Borjas MD;  Location: [facility];  Service: Endoscopy;  Laterality: N/A;    COLONOSCOPY N/A 8/24/2016    [facility]    CYSTOSCOPY N/A 5/23/2016    Performed by Adeline Moss MD at [facility] OR    GASTRIC BYPASS  2/5/2008    IVC FILTER RETRIEVAL      JOINT REPLACEMENT  1996 and 2001    bi-lat hip replacement/Rt Hip and Lt Hip    RADIOFREQUENCY THERMOCOAGULATION (RFTC)-NERVE-MEDIAN BRANCH-LUMBAR Bilateral 8/3/2017    Performed by Nile [facility]y [facility] at [facility] OR    ROTATOR  CUFF REPAIR      Rt shoulder    Stents  8/18/2010    x 3    TRANSRECTAL ULTRASOUND GUIDED PROSTATE BIOPSY N/A 8/1/2016    [facility]    TRANSRECTAL ULTRASOUND GUIDED PROSTATE BIOPSY N/A 5/23/2016    [facility] OR    UPPER GASTROINTESTINAL ENDOSCOPY  02/2011       Current Outpatient Medications   Medication Sig    alendronate-vitamin D3 (FOSAMAX PLUS D) 70 mg- 2,800 unit per tablet Take 1 tablet by mouth every 7 days.    allopurinol (ZYLOPRIM) 100 MG tablet TAKE 1 TABLET(100 MG) BY MOUTH EVERY DAY    ALPRAZolam (XANAX) 1 MG tablet TAKE 1 TABLET(1 MG) BY MOUTH EVERY EVENING    aspirin (ECOTRIN) 81 MG EC tablet Take 81 mg by mouth once daily.    atorvastatin (LIPITOR) 80 MG tablet TAKE 1 TABLET(80 MG) BY MOUTH EVERY DAY    calcitRIOL (ROCALTROL) 0.5 MCG Cap TK 1 C PO D    calcium carbonate (TUMS ULTRA) 400 mg calcium (1,000 mg) Chew Take 1 tablet (400 mg total) by mouth once daily.    carvedilol (COREG) 25 MG tablet TAKE 1 TABLET BY MOUTH TWICE DAILY WITH MEALS    cefUROXime (CEFTIN) 250 MG tablet Take 1 tablet (250 mg total) by mouth 2 (two) times daily.    ergocalciferol (ERGOCALCIFEROL) 50,000 unit Cap TK 1 C PO Q WEEK    escitalopram oxalate (LEXAPRO) 10 MG tablet TAKE 1 TABLET(10 MG) BY MOUTH EVERY DAY    ferrous sulfate 325 mg (65 mg iron) Tab tablet TAKE 1 TABLET BY MOUTH TWICE DAILY    fluticasone (FLONASE) 50 mcg/actuation nasal spray 2 sprays by Each Nare route once daily.    FOLIC ACID/MULTIVIT-MIN/LUTEIN (CENTRUM SILVER ORAL) Take 1 tablet by mouth once daily.    furosemide (LASIX) 40 MG tablet TAKE 1 TABLET BY MOUTH DAILY AS NEEDED    HYDROcodone-acetaminophen (NORCO) 7.5-325 mg per tablet Take 1 tablet by mouth every 6 (six) hours as needed for Pain.    L-METHYL-B6-B12 3-35-2 mg Tab TAKE 1 TABLET BY MOUTH TWICE DAILY    L-METHYL-B6-B12 3-35-2 mg Tab TAKE 1 TABLET BY MOUTH TWICE DAILY    lisinopril  (PRINIVIL,ZESTRIL) 40 MG tablet TAKE 1 TABLET BY MOUTH EVERY EVENING    MAGOX 400 mg tablet TAKE 1 TABLET BY MOUTH EVERY DAY    memantine (NAMENDA) 5 MG Tab Take 1 tablet (5 mg total) by mouth 2 (two) times daily.    mirabegron (MYRBETRIQ) 50 mg Tb24 Take 1 tablet (50 mg total) by mouth once daily.    nitroGLYCERIN 0.4 MG/DOSE TL SPRY (NITROLINGUAL) 400 mcg/spray spray PLACE 1 SPRAY UNDER THE TONGUE EVERY 5 MINUTES AS NEEDED FOR CHEST PAIN    omeprazole (PRILOSEC) 20 MG capsule TAKE 1 CAPSULE(20 MG) BY MOUTH EVERY DAY    ranitidine (ZANTAC) 150 MG tablet TAKE 1 TABLET(150 MG) BY MOUTH TWICE DAILY    triamcinolone acetonide 0.1% (KENALOG) 0.1 % cream AAA bid    vit C-vit E-lutein-min-om-3 (OCUVITE) 568-24-5-150 mg-unit-mg-mg Cap Take 1 capsule by mouth once daily.    warfarin (COUMADIN) 5 MG tablet TAKE 1 TABLET BY MOUTH EVERY DAY (Patient taking differently: TAKE 1 TABLET BY MOUTH EVERY DAY EXCEPT 1 1/2 TABLETS ON WEDNESDAY)    hydrocortisone 2.5 % cream Apply topically 2 (two) times daily. for 10 days    ipratropium (ATROVENT) 0.02 % nebulizer solution Take 2.5 mLs (500 mcg total) by nebulization 4 (four) times daily. Rescue     No current facility-administered medications for this visit.        Review of patient's allergies indicates:   Allergen Reactions    Shellfish containing products Other (See Comments)     Other reaction(s): Gout       Family History   Problem Relation Age of Onset    Heart attack Mother     Heart attack Father     Heart attack Brother     Ulcerative colitis Daughter 35    Lupus Daughter     Colon cancer Neg Hx     Colon polyps Neg Hx     Crohn's disease Neg Hx     Melanoma Neg Hx     Psoriasis Neg Hx     Eczema Neg Hx        Social History     Socioeconomic History    Marital status:      Spouse name: Not on file    Number of children: Not on file    Years of education: Not on file    Highest education level: Not on file   Social Needs    Financial  resource strain: Not on file    Food insecurity - worry: Not on file    Food insecurity - inability: Not on file    Transportation needs - medical: Not on file    Transportation needs - non-medical: Not on file   Occupational History    Not on file   Tobacco Use    Smoking status: Former Smoker    Smokeless tobacco: Never Used    Tobacco comment: 45 yrs ago, only smoked 3-4 packs in his entire life as a teenager   Substance and Sexual Activity    Alcohol use: Yes     Comment: rare    Drug use: No    Sexual activity: Not on file   Other Topics Concern    Not on file   Social History Narrative    Not on file       Chief Complaint:   Chief Complaint   Patient presents with    Hand Pain     follow up hand fracture    Hip Pain     follow up hip contusion       History of present illness:  73 the-year-old morbidly obese male who was in a wheelchair comes in for left hand and hip pain. Patient fell on October 14, 2018.  Had a swollen and bruised left hand as well as a large hematoma on the left hip. Patient was on blood thinners.  Pain is a 5/10.  Hand feels much better.  Left hip shows no more bruising but still has a little hematoma present.      Review of Systems:  Musculoskeletal:  See HPI      Physical Examination:    Vital Signs:    Vitals:    11/01/18 1049   BP: (!) 144/65   Pulse: 68       Body mass index is 42.18 kg/m².    This a well-developed, well nourished patient in no acute distress.  They are alert and oriented and cooperative to examination.  Pt.  Comes in in a wheelchair    Examination of his left hand shows resolved bruising and swelling.  Minimal Tenderness over the 4th metacarpal.  Able to make a fist without malrotation or deformity.  Intact light touch sensation and brisk capillary refill in all digits.        X-rays:  X-rays of the left hand are ordered and reviewed which show a nondisplaced fracture near the base of the 4th metacarpal.  Some scapholunate widening.  X-rays of left hip  is reviewed which show no acute fracture.  Patient has bilateral hip replacements with some heterotopic ossification     Assessment::  Left 4th metacarpal fracture  Left hip contusion with hematoma    Plan:  I reviewed the finding with him today.  Okay to stop the brace.  Recommended heat compresses for his legs.  Follow up in 6 weeks with another x-ray of the left hand.        This note was created using [employer] voice recognition software that occasionally misinterpreted phrases or words.    Consult note is delivered via Epic messaging service.

## 2018-11-06 ENCOUNTER — OFFICE VISIT (OUTPATIENT)
Dept: PODIATRY | Facility: CLINIC | Age: 73
End: 2018-11-06
Payer: MEDICARE

## 2018-11-06 VITALS
DIASTOLIC BLOOD PRESSURE: 65 MMHG | HEIGHT: 69 IN | BODY MASS INDEX: 43.85 KG/M2 | RESPIRATION RATE: 14 BRPM | SYSTOLIC BLOOD PRESSURE: 144 MMHG | WEIGHT: 296.06 LBS

## 2018-11-06 DIAGNOSIS — L97.512 DIABETIC ULCER OF TOE OF RIGHT FOOT ASSOCIATED WITH TYPE 2 DIABETES MELLITUS, WITH FAT LAYER EXPOSED: ICD-10-CM

## 2018-11-06 DIAGNOSIS — M54.5 CHRONIC LOW BACK PAIN, UNSPECIFIED BACK PAIN LATERALITY, WITH SCIATICA PRESENCE UNSPECIFIED: ICD-10-CM

## 2018-11-06 DIAGNOSIS — E11.621 DIABETIC ULCER OF TOE OF RIGHT FOOT ASSOCIATED WITH TYPE 2 DIABETES MELLITUS, WITH FAT LAYER EXPOSED: ICD-10-CM

## 2018-11-06 DIAGNOSIS — G89.29 CHRONIC LOW BACK PAIN, UNSPECIFIED BACK PAIN LATERALITY, WITH SCIATICA PRESENCE UNSPECIFIED: ICD-10-CM

## 2018-11-06 DIAGNOSIS — F41.9 ANXIETY: ICD-10-CM

## 2018-11-06 PROCEDURE — 11042 DBRDMT SUBQ TIS 1ST 20SQCM/<: CPT | Mod: S$GLB,,, | Performed by: PODIATRIST

## 2018-11-06 PROCEDURE — 99499 UNLISTED E&M SERVICE: CPT | Mod: S$GLB,,, | Performed by: PODIATRIST

## 2018-11-06 PROCEDURE — 99999 PR PBB SHADOW E&M-EST. PATIENT-LVL III: CPT | Mod: PBBFAC,,, | Performed by: PODIATRIST

## 2018-11-06 RX ORDER — ALPRAZOLAM 1 MG/1
TABLET ORAL
Qty: 30 TABLET | Refills: 0 | OUTPATIENT
Start: 2018-11-06

## 2018-11-06 RX ORDER — HYDROCODONE BITARTRATE AND ACETAMINOPHEN 7.5; 325 MG/1; MG/1
1 TABLET ORAL EVERY 6 HOURS PRN
Qty: 90 TABLET | Refills: 0 | OUTPATIENT
Start: 2018-11-06

## 2018-11-06 NOTE — TELEPHONE ENCOUNTER
----- Message from Dolly Martino sent at 11/6/2018  9:46 AM CST -----  Contact: SELF  Mr. Ramos requested a refill on 2 of his prescriptions when he checked in for his apt today at the clinic   HYDROcodone-acetaminophen (NORCO) 7.5-325 mg per tablet  ALPRAZolam (XANAX) 1MG [facility] [id number] [facility] [address] AT [facility] [address]

## 2018-11-06 NOTE — PROGRESS NOTES
Subjective:      Patient ID: Richard Bustos is a 73 y.o. male.    Chief Complaint: Foot Ulcer (RIGHT FOOT ULCER )    Richard Bustos is a 73 y.o. male here for follow up of right hallux foot ulcer.  Denies trauma or falls, denies F/C/N/V.     Review of Systems   Constitution: Negative for chills, diaphoresis, fever, malaise/fatigue and night sweats.   Cardiovascular: Negative for claudication, cyanosis, leg swelling and syncope.   Skin: Positive for poor wound healing. Negative for color change, dry skin, nail changes, rash, suspicious lesions and unusual hair distribution.   Musculoskeletal: Negative for falls, joint pain, joint swelling, muscle cramps, muscle weakness and stiffness.   Gastrointestinal: Negative for constipation, diarrhea, nausea and vomiting.   Neurological: Positive for sensory change. Negative for brief paralysis, disturbances in coordination, focal weakness, numbness, paresthesias and tremors.           Objective:        Physical Exam   Constitutional: He is oriented to person, place, and time. He appears well-developed and well-nourished. He is cooperative. No distress.   Cardiovascular:   Pulses:       Popliteal pulses are 2+ on the right side, and 2+ on the left side.        Dorsalis pedis pulses are 1+ on the right side, and 1+ on the left side.        Posterior tibial pulses are 1+ on the right side, and 1+ on the left side.   Capillary refill 3 seconds all toes/distal feet, all toes/both feet warm to touch.      Negative lymphadenopathy bilateral popliteal fossa and tarsal tunnel.      Negavie lower extremity edema bilateral.     Musculoskeletal:        Right ankle: He exhibits normal range of motion, no swelling, no ecchymosis, no deformity, no laceration and normal pulse. Achilles tendon normal. Achilles tendon exhibits no pain, no defect and normal Chung's test results.   Patient has hammertoes of digits  2-5 bilateral                 partially reducible without symptom today.      Lymphadenopathy: No inguinal adenopathy noted on the right or left side.   Negative lymphadenopathy bilateral popliteal fossa and tarsal tunnel.    Negative lymphangitic streaking bilateral feet/ankles/legs.   Neurological: He is alert and oriented to person, place, and time. He has normal strength. He displays no atrophy and no tremor. A sensory deficit is present. He exhibits normal muscle tone. Gait normal.   Reflex Scores:       Patellar reflexes are 2+ on the right side and 2+ on the left side.       Achilles reflexes are 2+ on the right side and 2+ on the left side.  Diminished/loss of protective sensation all toes bilateral to 10 gram monofilament.     Skin: Skin is warm, dry and intact. Capillary refill takes 2 to 3 seconds. No abrasion, no bruising, no burn, no ecchymosis, no laceration, no lesion and no rash noted. He is not diaphoretic. No cyanosis or erythema. No pallor. Nails show no clubbing.   Wound anterior shin right and left closed today, epithelialized  without ulceration, drainage, pus, tracking, fluctuance, malodor, or cardinal signs infection.        Wound:  Plantar right 1st mtpj    Size:    Pre: [id number]      Base:  Granular, pink with moderate serous/serosanaguinous drainage only.  No pus, tracking, fluctuance, malodor, or cardinal signs infection.    Borders:  Hyperkeratotic, changing to flat, pink, blanchable skin edges without undermining.     Psychiatric: He has a normal mood and affect.             Assessment:       Encounter Diagnosis   Name Primary?    Diabetic ulcer of toe of right foot associated with type 2 diabetes mellitus, with fat layer exposed          Plan:       Richard was seen today for foot ulcer.    Diagnoses and all orders for this visit:    Diabetic ulcer of toe of right foot associated with type 2 diabetes mellitus, with fat layer exposed      I counseled the patient on his conditions, their implications and medical management.    Dressed right foot wound with  apertures x 2,  wound foam, kerlix.  - change same every other day.    Debrided wound, see note below    Tubigrip to both legs to knee 12 hours daily.    Continue minimal ambulation right in sx shoe and left DM shoe/insert.    Adequate vitamin supplementation, protein intake, and hydration - discussed with patient.    rtc 1 week        No Follow-up on file.    Wound Debridement  Date/Time: 11/6/2018 10:54 AM  Performed by: Andrew Ruiz MD  Authorized by: Iván Torrez DPM     Consent Done?:  Yes (Verbal)    Type of Debridement:  Excisional       Length (cm):  0.3       Area (sq cm):  0.09       Width (cm):  0.3       Percent Debrided (%):  50       Depth (cm):  0.1       Total Area Debrided (sq cm):  0.05    Depth of debridement:  Subcutaneous tissue    Devitalized tissue debrided:  Callus and Biofilm    Instruments:  Curette    Bleeding:  None

## 2018-11-12 ENCOUNTER — TELEPHONE (OUTPATIENT)
Dept: FAMILY MEDICINE | Facility: CLINIC | Age: 73
End: 2018-11-12

## 2018-11-12 ENCOUNTER — PATIENT MESSAGE (OUTPATIENT)
Dept: FAMILY MEDICINE | Facility: CLINIC | Age: 73
End: 2018-11-12

## 2018-11-12 ENCOUNTER — PATIENT OUTREACH (OUTPATIENT)
Dept: ADMINISTRATIVE | Facility: HOSPITAL | Age: 73
End: 2018-11-12

## 2018-11-12 DIAGNOSIS — M54.5 CHRONIC LOW BACK PAIN, UNSPECIFIED BACK PAIN LATERALITY, WITH SCIATICA PRESENCE UNSPECIFIED: ICD-10-CM

## 2018-11-12 DIAGNOSIS — F41.9 ANXIETY: ICD-10-CM

## 2018-11-12 DIAGNOSIS — G89.29 CHRONIC LOW BACK PAIN, UNSPECIFIED BACK PAIN LATERALITY, WITH SCIATICA PRESENCE UNSPECIFIED: ICD-10-CM

## 2018-11-12 RX ORDER — HYDROCODONE BITARTRATE AND ACETAMINOPHEN 7.5; 325 MG/1; MG/1
1 TABLET ORAL EVERY 6 HOURS PRN
Qty: 90 TABLET | Refills: 0 | Status: SHIPPED | OUTPATIENT
Start: 2018-11-12 | End: 2018-12-05 | Stop reason: SDUPTHER

## 2018-11-12 RX ORDER — ALPRAZOLAM 1 MG/1
TABLET ORAL
Qty: 30 TABLET | Refills: 0 | Status: SHIPPED | OUTPATIENT
Start: 2018-11-12 | End: 2018-12-05 | Stop reason: SDUPTHER

## 2018-11-12 RX ORDER — ALPRAZOLAM 1 MG/1
TABLET ORAL
Qty: 30 TABLET | Refills: 0 | OUTPATIENT
Start: 2018-11-12

## 2018-11-12 NOTE — TELEPHONE ENCOUNTER
----- Message from Elsa Pacheco sent at 11/12/2018 12:17 PM CST -----  Type:  RX Refill Request    Who Called: Friend- Sita Almaraz  Refill or New Rx:  refill  RX Name and Strength:    HYDROcodone-acetaminophen (NORCO) 7.5-325 mg per tablet  How is the patient currently taking it? (ex. 1XDay):  Na  Is this a 30 day or 90 day RX:  30  Preferred Pharmacy with phone number:    [id number] [place] - [address] AT [facility] [address] [place] [address]  Phone: [phone] Fax: [phone]  Local or Mail Order:  local  Ordering Provider:  Ashley Benitez Call Back Number:  [phone] (home)     Additional Information:  Stating the patient is in a lot of pain, having a problem with his foot and having trouble getting around. Left a message this afternoon

## 2018-11-12 NOTE — LETTER
2018    Richard Bustos  [address] [place] [place] [address]             [facility]  [address]  [place] [address]  Phone: [phone] Dear [facility] is committed to your overall health and would like to ensure that you are up to date on your recommended test and/or procedures.   Familia Ramirez Jr, MD  has found that your chart shows you may be due for the followin EYE EXAM & FOOT EXAM     If you have had any of the above done at another facility, please let us know so that we may obtain copies from that facility.  If you have a copy of these records, please provide a copy for us to scan into your chart.  You are welcome to request that the report be faxed to us at  ([phone]).     Otherwise, please schedule these appointments at your earliest convenience by calling [phone] or going to [email or web address].         Sincerely,   [facility]   MD Carmen Kearney Jr, LPN Clinical Care Coordinator   [place] [facility] [address] [place] [address]   Phone [phone]   Fax [phone]

## 2018-11-12 NOTE — TELEPHONE ENCOUNTER
----- Message from Jing Metcalf sent at 11/12/2018  1:19 PM CST -----  Contact: [place] - [phone]  Type: Needs Medical Advice    Who Called:  Carolina hernandez  Best Call Back Number:[phone] (home)   Additional Information: patient is still waiting to hear about his prescriptions Hydrocodone,Xanex completely out of medication would like a call back. Thanks

## 2018-11-12 NOTE — TELEPHONE ENCOUNTER
----- Message from Sheyla Savannah sent at 11/12/2018 12:10 PM CST -----  Contact: Citlali  Type:  RX Refill Request    Who Called:  daughter  Refill or New Rx:  refill  RX Name and Strength:    1. HYDROcodone-acetaminophen (NORCO) 7.5-325 mg per tablet  2. ALPRAZolam (XANAX) 1 MG tablet  How is the patient currently taking it? (ex. 1XDay):    1. Take 1 tablet by mouth every 6 (six) hours as needed for Pain  2. TAKE 1 TABLET(1 MG) BY MOUTH EVERY EVENING  Is this a 30 day or 90 day RX:  30  Preferred Pharmacy with phone number:    [facility] [id number]  [place]  [address] AT [facility] [address] [address]  Phone: [phone] Fax: [phone]  Local or Mail Order:  local  Ordering Provider:  Dr Familia Ramirez  Best Call Back Number:  [phone]  Additional Information:  Out of medication. Please call when sent to pharmacy. Thanks!

## 2018-11-12 NOTE — TELEPHONE ENCOUNTER
----- Message from Staci Bonilla sent at 11/12/2018  1:10 PM CST -----   Type:  Pharmacy Calling to Clarify an RX    Name of Caller:  pt  Pharmacy Name:    [facility] [id number] - [place] [address] AT [facility] [address]  [address] [address]  Phone: [phone] Fax: [phone]    Prescription Name:  hydrochdone   And  Xanax     Best Call Back Number:  [phone]

## 2018-11-13 ENCOUNTER — HOSPITAL ENCOUNTER (OUTPATIENT)
Dept: RADIOLOGY | Facility: HOSPITAL | Age: 73
Discharge: HOME | End: 2018-11-13
Attending: ORTHOPAEDIC SURGERY
Payer: MEDICARE

## 2018-11-13 ENCOUNTER — OFFICE VISIT (OUTPATIENT)
Dept: ORTHOPEDICS | Facility: CLINIC | Age: 73
End: 2018-11-13
Payer: MEDICARE

## 2018-11-13 VITALS
WEIGHT: 296.06 LBS | BODY MASS INDEX: 43.85 KG/M2 | HEART RATE: 56 BPM | SYSTOLIC BLOOD PRESSURE: 178 MMHG | HEIGHT: 69 IN | DIASTOLIC BLOOD PRESSURE: 88 MMHG

## 2018-11-13 DIAGNOSIS — M25.562 LEFT KNEE PAIN, UNSPECIFIED CHRONICITY: ICD-10-CM

## 2018-11-13 DIAGNOSIS — S82.025A CLOSED NONDISPLACED LONGITUDINAL FRACTURE OF LEFT PATELLA, INITIAL ENCOUNTER: Primary | ICD-10-CM

## 2018-11-13 DIAGNOSIS — M25.562 LEFT KNEE PAIN, UNSPECIFIED CHRONICITY: Primary | ICD-10-CM

## 2018-11-13 PROCEDURE — 73564 X-RAY EXAM KNEE 4 OR MORE: CPT | Mod: TC,PN,LT

## 2018-11-13 PROCEDURE — 73564 X-RAY EXAM KNEE 4 OR MORE: CPT | Mod: 26,LT,, | Performed by: RADIOLOGY

## 2018-11-13 PROCEDURE — 99214 OFFICE O/P EST MOD 30 MIN: CPT | Mod: 24,S$GLB,, | Performed by: ORTHOPAEDIC SURGERY

## 2018-11-13 PROCEDURE — 1100F PTFALLS ASSESS-DOCD GE2>/YR: CPT | Mod: CPTII,S$GLB,, | Performed by: ORTHOPAEDIC SURGERY

## 2018-11-13 PROCEDURE — 3288F FALL RISK ASSESSMENT DOCD: CPT | Mod: CPTII,S$GLB,, | Performed by: ORTHOPAEDIC SURGERY

## 2018-11-13 PROCEDURE — 73562 X-RAY EXAM OF KNEE 3: CPT | Mod: 26,XS,RT, | Performed by: RADIOLOGY

## 2018-11-13 PROCEDURE — 99999 PR PBB SHADOW E&M-EST. PATIENT-LVL III: CPT | Mod: PBBFAC,,, | Performed by: ORTHOPAEDIC SURGERY

## 2018-11-13 PROCEDURE — 3077F SYST BP >= 140 MM HG: CPT | Mod: CPTII,S$GLB,, | Performed by: ORTHOPAEDIC SURGERY

## 2018-11-13 PROCEDURE — 3079F DIAST BP 80-89 MM HG: CPT | Mod: CPTII,S$GLB,, | Performed by: ORTHOPAEDIC SURGERY

## 2018-11-14 ENCOUNTER — TELEPHONE (OUTPATIENT)
Dept: FAMILY MEDICINE | Facility: CLINIC | Age: 73
End: 2018-11-14

## 2018-11-14 ENCOUNTER — PATIENT MESSAGE (OUTPATIENT)
Dept: FAMILY MEDICINE | Facility: CLINIC | Age: 73
End: 2018-11-14

## 2018-11-14 DIAGNOSIS — Z87.81 S/P TIBIAL FRACTURE: Primary | ICD-10-CM

## 2018-11-14 DIAGNOSIS — S82.002A CLOSED DISPLACED FRACTURE OF LEFT PATELLA, UNSPECIFIED FRACTURE MORPHOLOGY, INITIAL ENCOUNTER: Primary | ICD-10-CM

## 2018-11-14 NOTE — TELEPHONE ENCOUNTER
Spoke with Dominique at [facility] about patient needing a BSC extra large due to his weight. She said the order needs to be put in and the medical necessity form filled out and faxed with notes and tests. I will do that. I asked Dr. Camargo office to put the order in when he types his note. No response as of yet.

## 2018-11-14 NOTE — TELEPHONE ENCOUNTER
Medical necessity form, order and xray report faxed to N. Will give faxed packet to Dr. Ramirez's nurse to follow up.

## 2018-11-14 NOTE — TELEPHONE ENCOUNTER
----- Message from Carrie Allen sent at 11/14/2018  9:13 AM CST -----  Type: Needs Medical Advice    Who Called:  Chey Haystonajacque Bladimirpauline - daughter  Symptoms (please be specific):  Patient fell on 11/12/18, broken knee  Best Call Back Number: [phone]  Additional Information: is requesting a bed side commode, extra wide heavy duty due to patient is over 300 lbs, she is requesting order sent to [facility], please contact caller to confirm order has been sent.    Thank you

## 2018-11-19 DIAGNOSIS — M25.562 LEFT KNEE PAIN, UNSPECIFIED CHRONICITY: Primary | ICD-10-CM

## 2018-11-20 ENCOUNTER — OFFICE VISIT (OUTPATIENT)
Dept: ORTHOPEDICS | Facility: CLINIC | Age: 73
End: 2018-11-20
Payer: MEDICARE

## 2018-11-20 ENCOUNTER — HOSPITAL ENCOUNTER (OUTPATIENT)
Dept: RADIOLOGY | Facility: HOSPITAL | Age: 73
Discharge: HOME | End: 2018-11-20
Attending: ORTHOPAEDIC SURGERY
Payer: MEDICARE

## 2018-11-20 VITALS
DIASTOLIC BLOOD PRESSURE: 68 MMHG | WEIGHT: 296 LBS | HEART RATE: 57 BPM | SYSTOLIC BLOOD PRESSURE: 135 MMHG | HEIGHT: 69 IN | BODY MASS INDEX: 43.84 KG/M2

## 2018-11-20 DIAGNOSIS — M25.562 LEFT KNEE PAIN, UNSPECIFIED CHRONICITY: ICD-10-CM

## 2018-11-20 DIAGNOSIS — S82.025D CLOSED NONDISPLACED LONGITUDINAL FRACTURE OF LEFT PATELLA WITH ROUTINE HEALING, SUBSEQUENT ENCOUNTER: Primary | ICD-10-CM

## 2018-11-20 PROCEDURE — 3075F SYST BP GE 130 - 139MM HG: CPT | Mod: CPTII,S$GLB,, | Performed by: ORTHOPAEDIC SURGERY

## 2018-11-20 PROCEDURE — 1101F PT FALLS ASSESS-DOCD LE1/YR: CPT | Mod: CPTII,S$GLB,, | Performed by: ORTHOPAEDIC SURGERY

## 2018-11-20 PROCEDURE — 99213 OFFICE O/P EST LOW 20 MIN: CPT | Mod: 24,S$GLB,, | Performed by: ORTHOPAEDIC SURGERY

## 2018-11-20 PROCEDURE — 73560 X-RAY EXAM OF KNEE 1 OR 2: CPT | Mod: 26,LT,, | Performed by: RADIOLOGY

## 2018-11-20 PROCEDURE — 73560 X-RAY EXAM OF KNEE 1 OR 2: CPT | Mod: TC,PN,LT

## 2018-11-20 PROCEDURE — 3078F DIAST BP <80 MM HG: CPT | Mod: CPTII,S$GLB,, | Performed by: ORTHOPAEDIC SURGERY

## 2018-11-20 PROCEDURE — 99999 PR PBB SHADOW E&M-EST. PATIENT-LVL III: CPT | Mod: PBBFAC,,, | Performed by: ORTHOPAEDIC SURGERY

## 2018-11-20 NOTE — PROGRESS NOTES
Past Medical History:   Diagnosis Date    Anemia     Anemia of other chronic disease 9/13/2017    Anemia, chronic renal failure 9/13/2017    Anemia, unspecified 9/13/2017    Anticoagulant long-term use     Aorta aneurysm     Arthritis     Atrial fibrillation     CAD (coronary artery disease)     CHF (congestive heart failure)     Chronic kidney disease     Clotting disorder 9/13/2017    Colon polyp     Diabetes mellitus     not on meds    Diabetes mellitus type II     Diverticulosis     Elevated PSA     Encounter for blood transfusion     Former smoker     GERD (gastroesophageal reflux disease)     Gout     Hx of colonic polyps     Hypercoagulable state     Hyperlipidemia     Hypertension     MI, old 2010    Myocardial infarction     9/2010    Osteomyelitis of ankle or foot 3/9/2017    Prostate cancer 06/2016    Sleep apnea     Squamous cell carcinoma 01/23/2018    Left helix, imiquimod    Venous stasis     Chronic       Past Surgical History:   Procedure Laterality Date    ABDOMINAL SURGERY      BLOCK-NERVE-MEDIAL BRANCH-LUMBAR Bilateral 7/13/2017    [facility]    BLOCK-NERVE-MEDIAL BRANCH-LUMBAR Bilateral 6/22/2017    [facility]    CARDIAC SURGERY      COLONOSCOPY  02/2011    diverticulosis with diverticula with clot, no active bleeding    COLONOSCOPY N/A 8/24/2016    Procedure: COLONOSCOPY;  Surgeon: Saroj Borjas MD;  Location: [facility];  Service: Endoscopy;  Laterality: N/A;    COLONOSCOPY N/A 8/24/2016    [facility]    CYSTOSCOPY N/A 5/23/2016    [facility] at [facility] OR    GASTRIC BYPASS  2/5/2008    IVC FILTER RETRIEVAL      JOINT REPLACEMENT  1996 and 2001    bi-lat hip replacement/Rt Hip and Lt Hip    RADIOFREQUENCY THERMOCOAGULATION (RFTC)-NERVE-MEDIAN BRANCH-LUMBAR Bilateral 8/3/2017    [facility] at [facility] OR    ROTATOR  CUFF REPAIR      Rt shoulder    Stents  8/18/2010    x 3    TRANSRECTAL ULTRASOUND GUIDED PROSTATE BIOPSY N/A 8/1/2016    [facility]    TRANSRECTAL ULTRASOUND GUIDED PROSTATE BIOPSY N/A 5/23/2016    [facility] at [facility] OR    UPPER GASTROINTESTINAL ENDOSCOPY  02/2011       Current Outpatient Medications   Medication Sig    alendronate-vitamin D3 (FOSAMAX PLUS D) 70 mg- 2,800 unit per tablet Take 1 tablet by mouth every 7 days.    allopurinol (ZYLOPRIM) 100 MG tablet TAKE 1 TABLET(100 MG) BY MOUTH EVERY DAY    ALPRAZolam (XANAX) 1 MG tablet TAKE 1 TABLET(1 MG) BY MOUTH EVERY EVENING    aspirin (ECOTRIN) 81 MG EC tablet Take 81 mg by mouth once daily.    atorvastatin (LIPITOR) 80 MG tablet TAKE 1 TABLET(80 MG) BY MOUTH EVERY DAY    calcitRIOL (ROCALTROL) 0.5 MCG Cap TK 1 C PO D    calcium carbonate (TUMS ULTRA) 400 mg calcium (1,000 mg) Chew Take 1 tablet (400 mg total) by mouth once daily.    carvedilol (COREG) 25 MG tablet TAKE 1 TABLET BY MOUTH TWICE DAILY WITH MEALS    cefUROXime (CEFTIN) 250 MG tablet Take 1 tablet (250 mg total) by mouth 2 (two) times daily.    ergocalciferol (ERGOCALCIFEROL) 50,000 unit Cap TK 1 C PO Q WEEK    escitalopram oxalate (LEXAPRO) 10 MG tablet TAKE 1 TABLET(10 MG) BY MOUTH EVERY DAY    ferrous sulfate 325 mg (65 mg iron) Tab tablet TAKE 1 TABLET BY MOUTH TWICE DAILY    fluticasone (FLONASE) 50 mcg/actuation nasal spray 2 sprays by Each Nare route once daily.    FOLIC ACID/MULTIVIT-MIN/LUTEIN (CENTRUM SILVER ORAL) Take 1 tablet by mouth once daily.    furosemide (LASIX) 40 MG tablet TAKE 1 TABLET BY MOUTH DAILY AS NEEDED    HYDROcodone-acetaminophen (NORCO) 7.5-325 mg per tablet Take 1 tablet by mouth every 6 (six) hours as needed for Pain.    L-METHYL-B6-B12 3-35-2 mg Tab TAKE 1 TABLET BY MOUTH TWICE DAILY    L-METHYL-B6-B12 3-35-2 mg Tab TAKE 1 TABLET BY MOUTH TWICE DAILY    lisinopril  (PRINIVIL,ZESTRIL) 40 MG tablet TAKE 1 TABLET BY MOUTH EVERY EVENING    MAGOX 400 mg tablet TAKE 1 TABLET BY MOUTH EVERY DAY    memantine (NAMENDA) 5 MG Tab Take 1 tablet (5 mg total) by mouth 2 (two) times daily.    mirabegron (MYRBETRIQ) 50 mg Tb24 Take 1 tablet (50 mg total) by mouth once daily.    nitroGLYCERIN 0.4 MG/DOSE TL SPRY (NITROLINGUAL) 400 mcg/spray spray PLACE 1 SPRAY UNDER THE TONGUE EVERY 5 MINUTES AS NEEDED FOR CHEST PAIN    omeprazole (PRILOSEC) 20 MG capsule TAKE 1 CAPSULE(20 MG) BY MOUTH EVERY DAY    ranitidine (ZANTAC) 150 MG tablet TAKE 1 TABLET(150 MG) BY MOUTH TWICE DAILY    triamcinolone acetonide 0.1% (KENALOG) 0.1 % cream AAA bid    vit C-vit E-lutein-min-om-3 (OCUVITE) 702-38-5-150 mg-unit-mg-mg Cap Take 1 capsule by mouth once daily.    warfarin (COUMADIN) 5 MG tablet TAKE 1 TABLET BY MOUTH EVERY DAY (Patient taking differently: TAKE 1 TABLET BY MOUTH EVERY DAY EXCEPT 1 1/2 TABLETS ON WEDNESDAY)    hydrocortisone 2.5 % cream Apply topically 2 (two) times daily. for 10 days    ipratropium (ATROVENT) 0.02 % nebulizer solution Take 2.5 mLs (500 mcg total) by nebulization 4 (four) times daily. Rescue     No current facility-administered medications for this visit.        Review of patient's allergies indicates:   Allergen Reactions    Shellfish containing products Other (See Comments)     Other reaction(s): Gout       Family History   Problem Relation Age of Onset    Heart attack Mother     Heart attack Father     Heart attack Brother     Ulcerative colitis Daughter 35    Lupus Daughter     Colon cancer Neg Hx     Colon polyps Neg Hx     Crohn's disease Neg Hx     Melanoma Neg Hx     Psoriasis Neg Hx     Eczema Neg Hx        Social History     Socioeconomic History    Marital status:      Spouse name: Not on file    Number of children: Not on file    Years of education: Not on file    Highest education level: Not on file   Social Needs    Financial  "resource strain: Not on file    Food insecurity - worry: Not on file    Food insecurity - inability: Not on file    Transportation needs - medical: Not on file    Transportation needs - non-medical: Not on file   Occupational History    Not on file   Tobacco Use    Smoking status: Former Smoker    Smokeless tobacco: Never Used    Tobacco comment: 45 yrs ago, only smoked 3-4 packs in his entire life as a teenager   Substance and Sexual Activity    Alcohol use: Yes     Comment: rare    Drug use: No    Sexual activity: Not on file   Other Topics Concern    Not on file   Social History Narrative    Not on file       Chief Complaint:   Chief Complaint   Patient presents with    Left Knee - Injury       Consulting Physician: Self, Aaareferral    History of present illness:    This is a 73 y.o. male who complains of left knee pain following a fall yesterday while getting in the car.  He puts his pain at 8/10.  He is unable to bear weight.    Review of Systems:    Constitution: Denies chills, fever, and sweats.  HENT: Denies headaches or blurry vision.  Cardiovascular: Denies chest pain or irregular heart beat.  Respiratory: Denies cough or shortness of breath.  Gastrointestinal: Denies abdominal pain, nausea, or vomiting.  Musculoskeletal:  Denies muscle cramps.  Neurological: Denies dizziness or focal weakness.  Psychiatric/Behavioral: Normal mental status.  Hematologic/Lymphatic: Denies bleeding problem or easy bruising/bleeding.  Skin: Denies rash or suspicious lesions.    Examination:    Vital Signs:    Vitals:    11/13/18 1017   BP: (!) 178/88   Pulse: (!) 56   Weight: 134.3 kg (296 lb 1.2 oz)   Height: 5' 9" (1.753 m)   PainSc:   8   PainLoc: Knee       Body mass index is 43.72 kg/m².    This a well-developed, well nourished patient in no acute distress.    Alert and oriented x 3 and cooperative to examination.       Physical Exam: Left Knee Exam    Gait   Non-weight " bearing    Skin  Rash:   None  Scars:   None    Inspection  Erythema:  None  Bruising:  None  Effusion:  Moderate  Masses:  None  Lymphadenopathy: None    Range of Motion: Not tested due to fracture    Medial Joint : No  Lateral Joint : No    Patellofemoral Tenderness: Yes    Lachman:  Normal  Anterior Drawer: Normal  Posterior Drawer: Normal    Varus Stress:  Stable  Valgus Stress:  Stable    Strength:  Not tested due to fracture    Pulses:  Palpable distal    Sensation:  Intact        Imaging: X-rays ordered and images interpreted today personally by me of left knee shows a vertical nondisplaced fracture of the left patella        Assessment: Closed nondisplaced longitudinal fracture of left patella, initial encounter        Plan:  We will place him into a brace today.  He is allowed to weightbear as tolerated in the brace.  He is not to bend the knee. We will see him back in 1 week with new x-rays.  We have ordered him a bedside commode and pain medicine prescription.      DISCLAIMER: This note may have been dictated using voice recognition software and may contain grammatical errors.     NOTE: Consult report sent to referring provider via [facility] EMR.

## 2018-11-20 NOTE — PROGRESS NOTES
Past Medical History:   Diagnosis Date    Anemia     Anemia of other chronic disease 9/13/2017    Anemia, chronic renal failure 9/13/2017    Anemia, unspecified 9/13/2017    Anticoagulant long-term use     Aorta aneurysm     Arthritis     Atrial fibrillation     CAD (coronary artery disease)     CHF (congestive heart failure)     Chronic kidney disease     Clotting disorder 9/13/2017    Colon polyp     Diabetes mellitus     not on meds    Diabetes mellitus type II     Diverticulosis     Elevated PSA     Encounter for blood transfusion     Former smoker     GERD (gastroesophageal reflux disease)     Gout     Hx of colonic polyps     Hypercoagulable state     Hyperlipidemia     Hypertension     MI, old 2010    Myocardial infarction     9/2010    Osteomyelitis of ankle or foot 3/9/2017    Prostate cancer 06/2016    Sleep apnea     Squamous cell carcinoma 01/23/2018    Left helix, imiquimod    Venous stasis     Chronic       Past Surgical History:   Procedure Laterality Date    ABDOMINAL SURGERY      BLOCK-NERVE-MEDIAL BRANCH-LUMBAR Bilateral 7/13/2017    [facility]    BLOCK-NERVE-MEDIAL BRANCH-LUMBAR Bilateral 6/22/2017    [facility]    CARDIAC SURGERY      COLONOSCOPY  02/2011    diverticulosis with diverticula with clot, no active bleeding    COLONOSCOPY N/A 8/24/2016    Procedure: COLONOSCOPY;  Surgeon: Saroj Borjas MD;  Location: [facility];  Service: Endoscopy;  Laterality: N/A;    COLONOSCOPY N/A 8/24/2016    [facility]O    CYSTOSCOPY N/A 5/23/2016    [facility] at [facility] OR    GASTRIC BYPASS  2/5/2008    IVC FILTER RETRIEVAL      JOINT REPLACEMENT  1996 and 2001    bi-lat hip replacement/Rt Hip and Lt Hip    RADIOFREQUENCY THERMOCOAGULATION (RFTC)-NERVE-MEDIAN BRANCH-LUMBAR Bilateral 8/3/2017    [facility] OR    ROTATOR  CUFF REPAIR      Rt shoulder    Stents  8/18/2010    x 3    TRANSRECTAL ULTRASOUND GUIDED PROSTATE BIOPSY N/A 8/1/2016    [facility]    TRANSRECTAL ULTRASOUND GUIDED PROSTATE BIOPSY N/A 5/23/2016    [facility] OR    UPPER GASTROINTESTINAL ENDOSCOPY  02/2011       Current Outpatient Medications   Medication Sig    alendronate-vitamin D3 (FOSAMAX PLUS D) 70 mg- 2,800 unit per tablet Take 1 tablet by mouth every 7 days.    allopurinol (ZYLOPRIM) 100 MG tablet TAKE 1 TABLET(100 MG) BY MOUTH EVERY DAY    ALPRAZolam (XANAX) 1 MG tablet TAKE 1 TABLET(1 MG) BY MOUTH EVERY EVENING    aspirin (ECOTRIN) 81 MG EC tablet Take 81 mg by mouth once daily.    atorvastatin (LIPITOR) 80 MG tablet TAKE 1 TABLET(80 MG) BY MOUTH EVERY DAY    calcitRIOL (ROCALTROL) 0.5 MCG Cap TK 1 C PO D    calcium carbonate (TUMS ULTRA) 400 mg calcium (1,000 mg) Chew Take 1 tablet (400 mg total) by mouth once daily.    carvedilol (COREG) 25 MG tablet TAKE 1 TABLET BY MOUTH TWICE DAILY WITH MEALS    cefUROXime (CEFTIN) 250 MG tablet Take 1 tablet (250 mg total) by mouth 2 (two) times daily.    ergocalciferol (ERGOCALCIFEROL) 50,000 unit Cap TK 1 C PO Q WEEK    escitalopram oxalate (LEXAPRO) 10 MG tablet TAKE 1 TABLET(10 MG) BY MOUTH EVERY DAY    ferrous sulfate 325 mg (65 mg iron) Tab tablet TAKE 1 TABLET BY MOUTH TWICE DAILY    fluticasone (FLONASE) 50 mcg/actuation nasal spray 2 sprays by Each Nare route once daily.    FOLIC ACID/MULTIVIT-MIN/LUTEIN (CENTRUM SILVER ORAL) Take 1 tablet by mouth once daily.    furosemide (LASIX) 40 MG tablet TAKE 1 TABLET BY MOUTH DAILY AS NEEDED    HYDROcodone-acetaminophen (NORCO) 7.5-325 mg per tablet Take 1 tablet by mouth every 6 (six) hours as needed for Pain.    hydrocortisone 2.5 % cream Apply topically 2 (two) times daily. for 10 days    ipratropium (ATROVENT) 0.02 % nebulizer solution Take 2.5 mLs (500 mcg  total) by nebulization 4 (four) times daily. Rescue    L-METHYL-B6-B12 3-35-2 mg Tab TAKE 1 TABLET BY MOUTH TWICE DAILY    L-METHYL-B6-B12 3-35-2 mg Tab TAKE 1 TABLET BY MOUTH TWICE DAILY    lisinopril (PRINIVIL,ZESTRIL) 40 MG tablet TAKE 1 TABLET BY MOUTH EVERY EVENING    MAGOX 400 mg tablet TAKE 1 TABLET BY MOUTH EVERY DAY    memantine (NAMENDA) 5 MG Tab Take 1 tablet (5 mg total) by mouth 2 (two) times daily.    mirabegron (MYRBETRIQ) 50 mg Tb24 Take 1 tablet (50 mg total) by mouth once daily.    nitroGLYCERIN 0.4 MG/DOSE TL SPRY (NITROLINGUAL) 400 mcg/spray spray PLACE 1 SPRAY UNDER THE TONGUE EVERY 5 MINUTES AS NEEDED FOR CHEST PAIN    omeprazole (PRILOSEC) 20 MG capsule TAKE 1 CAPSULE(20 MG) BY MOUTH EVERY DAY    ranitidine (ZANTAC) 150 MG tablet TAKE 1 TABLET(150 MG) BY MOUTH TWICE DAILY    triamcinolone acetonide 0.1% (KENALOG) 0.1 % cream AAA bid    vit C-vit E-lutein-min-om-3 (OCUVITE) 102-61-6-150 mg-unit-mg-mg Cap Take 1 capsule by mouth once daily.    warfarin (COUMADIN) 5 MG tablet TAKE 1 TABLET BY MOUTH EVERY DAY (Patient taking differently: TAKE 1 TABLET BY MOUTH EVERY DAY EXCEPT 1 1/2 TABLETS ON WEDNESDAY)     No current facility-administered medications for this visit.        Review of patient's allergies indicates:   Allergen Reactions    Shellfish containing products Other (See Comments)     Other reaction(s): Gout       Family History   Problem Relation Age of Onset    Heart attack Mother     Heart attack Father     Heart attack Brother     Ulcerative colitis Daughter 35    Lupus Daughter     Colon cancer Neg Hx     Colon polyps Neg Hx     Crohn's disease Neg Hx     Melanoma Neg Hx     Psoriasis Neg Hx     Eczema Neg Hx        Social History     Socioeconomic History    Marital status:      Spouse name: Not on file    Number of children: Not on file    Years of education: Not on file    Highest education level: Not on file   Social Needs    Financial resource  "strain: Not on file    Food insecurity - worry: Not on file    Food insecurity - inability: Not on file    Transportation needs - medical: Not on file    Transportation needs - non-medical: Not on file   Occupational History    Not on file   Tobacco Use    Smoking status: Former Smoker    Smokeless tobacco: Never Used    Tobacco comment: 45 yrs ago, only smoked 3-4 packs in his entire life as a teenager   Substance and Sexual Activity    Alcohol use: Yes     Comment: rare    Drug use: No    Sexual activity: Not on file   Other Topics Concern    Not on file   Social History Narrative    Not on file       Chief Complaint:   Chief Complaint   Patient presents with    Leg Pain     LEFT LEG DISPLACED FX PATELLA       Consulting Physician: No ref. provider found    History of present illness:    This is a 73 y.o. male who complains of left knee pain following a fall 11-12-18 while getting in the car.  He puts his pain at 6/10.  He is able to bear some weight in brace.    Review of Systems:    Constitution: Denies chills, fever, and sweats.  HENT: Denies headaches or blurry vision.  Cardiovascular: Denies chest pain or irregular heart beat.  Respiratory: Denies cough or shortness of breath.  Gastrointestinal: Denies abdominal pain, nausea, or vomiting.  Musculoskeletal:  Denies muscle cramps.  Neurological: Denies dizziness or focal weakness.  Psychiatric/Behavioral: Normal mental status.  Hematologic/Lymphatic: Denies bleeding problem or easy bruising/bleeding.  Skin: Denies rash or suspicious lesions.    Examination:    Vital Signs:    Vitals:    11/20/18 1021   BP: 135/68   Pulse: (!) 57   Weight: 134.3 kg (296 lb)   Height: 5' 9" (1.753 m)   PainSc:   6   PainLoc: Leg       Body mass index is 43.71 kg/m².    This a well-developed, well nourished patient in no acute distress.    Alert and oriented x 3 and cooperative to examination.       Physical Exam: Left Knee " Exam    Gait   antalgic    Skin  Rash:   None  Scars:   None    Inspection  Erythema:  None  Bruising:  None  Effusion:  Mild  Masses:  None  Lymphadenopathy: None    Range of Motion: Not tested due to fracture    Medial Joint : No  Lateral Joint : No    Patellofemoral Tenderness: Yes    Lachman:  Normal  Anterior Drawer: Normal  Posterior Drawer: Normal    Varus Stress:  Stable  Valgus Stress:  Stable    Strength:  Not tested due to fracture    Pulses:  Palpable distal    Sensation:  Intact        Imaging: X-rays ordered and images interpreted today personally by me of left knee shows a vertical nondisplaced fracture of the left patella.       Assessment: Closed nondisplaced longitudinal fracture of left patella with routine healing, subsequent encounter        Plan:  We will continue his brace when he is ambulating and give him an immobilizer when he is seated so it is more comfortable for him.  Will plan to see him back in 4 weeks time.  He is still not to bend the knee.    DISCLAIMER: This note may have been dictated using voice recognition software and may contain grammatical errors.     NOTE: Consult report sent to referring provider via [employer].

## 2018-12-04 ENCOUNTER — DOCUMENTATION ONLY (OUTPATIENT)
Dept: FAMILY MEDICINE | Facility: CLINIC | Age: 73
End: 2018-12-04

## 2018-12-04 RX ORDER — IMIQUIMOD 12.5 MG/.25G
CREAM TOPICAL
Qty: 24 PACKET | Refills: 0 | Status: SHIPPED | OUTPATIENT
Start: 2018-12-04 | End: 2019-06-18

## 2018-12-04 NOTE — PROGRESS NOTES
Pre-Visit Chart Review  For Appointment Scheduled on 12/05/2018    Health Maintenance Due   Topic Date Due    Foot Exam  12/13/2018

## 2018-12-05 ENCOUNTER — OFFICE VISIT (OUTPATIENT)
Dept: FAMILY MEDICINE | Facility: CLINIC | Age: 73
End: 2018-12-05
Payer: MEDICARE

## 2018-12-05 VITALS
HEART RATE: 57 BPM | BODY MASS INDEX: 42.67 KG/M2 | DIASTOLIC BLOOD PRESSURE: 74 MMHG | WEIGHT: 288.13 LBS | HEIGHT: 69 IN | TEMPERATURE: 98 F | SYSTOLIC BLOOD PRESSURE: 142 MMHG

## 2018-12-05 DIAGNOSIS — N18.30 TYPE 2 DIABETES MELLITUS WITH STAGE 3 CHRONIC KIDNEY DISEASE, WITH LONG-TERM CURRENT USE OF INSULIN: ICD-10-CM

## 2018-12-05 DIAGNOSIS — Z79.899 CHRONICALLY ON BENZODIAZEPINE THERAPY: ICD-10-CM

## 2018-12-05 DIAGNOSIS — F41.9 ANXIETY: ICD-10-CM

## 2018-12-05 DIAGNOSIS — F11.90 CHRONIC, CONTINUOUS USE OF OPIOIDS: ICD-10-CM

## 2018-12-05 DIAGNOSIS — M54.5 CHRONIC LOW BACK PAIN, UNSPECIFIED BACK PAIN LATERALITY, WITH SCIATICA PRESENCE UNSPECIFIED: Primary | ICD-10-CM

## 2018-12-05 DIAGNOSIS — J01.90 ACUTE RHINOSINUSITIS: ICD-10-CM

## 2018-12-05 DIAGNOSIS — E11.59 HYPERTENSION ASSOCIATED WITH DIABETES: ICD-10-CM

## 2018-12-05 DIAGNOSIS — R09.89 CHRONIC SINUS COMPLAINTS: ICD-10-CM

## 2018-12-05 DIAGNOSIS — E11.22 TYPE 2 DIABETES MELLITUS WITH STAGE 3 CHRONIC KIDNEY DISEASE, WITH LONG-TERM CURRENT USE OF INSULIN: ICD-10-CM

## 2018-12-05 DIAGNOSIS — G89.29 CHRONIC LOW BACK PAIN, UNSPECIFIED BACK PAIN LATERALITY, WITH SCIATICA PRESENCE UNSPECIFIED: Primary | ICD-10-CM

## 2018-12-05 DIAGNOSIS — Z79.4 TYPE 2 DIABETES MELLITUS WITH STAGE 3 CHRONIC KIDNEY DISEASE, WITH LONG-TERM CURRENT USE OF INSULIN: ICD-10-CM

## 2018-12-05 DIAGNOSIS — G89.29 CHRONIC LOW BACK PAIN, UNSPECIFIED BACK PAIN LATERALITY, WITH SCIATICA PRESENCE UNSPECIFIED: ICD-10-CM

## 2018-12-05 DIAGNOSIS — I15.2 HYPERTENSION ASSOCIATED WITH DIABETES: ICD-10-CM

## 2018-12-05 DIAGNOSIS — M54.5 CHRONIC LOW BACK PAIN, UNSPECIFIED BACK PAIN LATERALITY, WITH SCIATICA PRESENCE UNSPECIFIED: ICD-10-CM

## 2018-12-05 PROCEDURE — 1101F PT FALLS ASSESS-DOCD LE1/YR: CPT | Mod: CPTII,S$GLB,, | Performed by: PHYSICIAN ASSISTANT

## 2018-12-05 PROCEDURE — 3078F DIAST BP <80 MM HG: CPT | Mod: CPTII,S$GLB,, | Performed by: PHYSICIAN ASSISTANT

## 2018-12-05 PROCEDURE — 99214 OFFICE O/P EST MOD 30 MIN: CPT | Mod: S$GLB,,, | Performed by: PHYSICIAN ASSISTANT

## 2018-12-05 PROCEDURE — 3044F HG A1C LEVEL LT 7.0%: CPT | Mod: CPTII,S$GLB,, | Performed by: PHYSICIAN ASSISTANT

## 2018-12-05 PROCEDURE — 99999 PR PBB SHADOW E&M-EST. PATIENT-LVL V: CPT | Mod: PBBFAC,,, | Performed by: PHYSICIAN ASSISTANT

## 2018-12-05 PROCEDURE — 3077F SYST BP >= 140 MM HG: CPT | Mod: CPTII,S$GLB,, | Performed by: PHYSICIAN ASSISTANT

## 2018-12-05 RX ORDER — FLUTICASONE PROPIONATE 50 MCG
2 SPRAY, SUSPENSION (ML) NASAL DAILY
Qty: 1 BOTTLE | Refills: 11 | Status: SHIPPED | OUTPATIENT
Start: 2018-12-05 | End: 2020-01-22

## 2018-12-05 RX ORDER — PREDNISONE 20 MG/1
20 TABLET ORAL 2 TIMES DAILY
Qty: 10 TABLET | Refills: 2 | Status: SHIPPED | OUTPATIENT
Start: 2018-12-05 | End: 2019-06-18

## 2018-12-05 NOTE — PROGRESS NOTES
Subjective:       Patient ID: Richard Bustos is a 73 y.o. male.    Chief Complaint: Follow-up    Patient with controlled type 2 diabetes mellitus, chronic kidney disease stage 3, polyneuropathy, chronic back pain, chronic continuous opioid dependence, chronic continuous benzodiazepine use, and anxiety presents for routine follow-up.  Patient states that all of his chronic medical conditions are currently stable.  He states that his pain in and anxiety are currently well controlled with current medications.  He is complaining of nasal congestion for the past 2-3 days.  He states that he has had a cough and chest congestion as well.  He has not taken any medications to help the symptoms.  Patients patient medical/surgical, social and family histories have been reviewed         Review of Systems   Constitutional: Negative for activity change, appetite change, fatigue and unexpected weight change.   Respiratory: Negative for cough, chest tightness and shortness of breath.    Cardiovascular: Negative for chest pain, palpitations and leg swelling.   Gastrointestinal: Negative for abdominal pain, anal bleeding, blood in stool, constipation, diarrhea and nausea.   Endocrine: Negative for polydipsia and polyuria.   Genitourinary: Negative for difficulty urinating, frequency, hematuria and urgency.   Musculoskeletal: Positive for arthralgias, back pain, gait problem and joint swelling.   Skin: Negative for rash.   Neurological: Negative for dizziness and headaches.   Psychiatric/Behavioral: Negative for dysphoric mood. The patient is not nervous/anxious.        Objective:      Physical Exam   Constitutional: He is cooperative. He does not have a sickly appearance. He does not appear ill. No distress.   Obese body habitus   Ambulating with cane    Eyes: Conjunctivae and EOM are normal. No scleral icterus.   Neck: Carotid bruit is not present.   Cardiovascular: Normal rate, regular rhythm and normal heart sounds.    Pulmonary/Chest: Effort normal. He has wheezes (few scattered end expiratory wheezed bilaterally ). He has no rhonchi. He has no rales.   Abdominal: Soft. Bowel sounds are normal. There is no tenderness.   Musculoskeletal:        Right lower leg: He exhibits no edema.        Left lower leg: He exhibits no edema.   Neurological: He is alert.   Vitals reviewed.      Assessment:       1. Chronic low back pain, unspecified back pain laterality, with sciatica presence unspecified    2. Chronic, continuous use of opioids    3. Chronically on benzodiazepine therapy    4. Anxiety    5. Chronic sinus complaints    6. Acute rhinosinusitis    7. Hypertension associated with diabetes    8. Type 2 diabetes mellitus with stage 3 chronic kidney disease, with long-term current use of insulin        Plan:       Richard was seen today for follow-up.    Diagnoses and all orders for this visit:    Chronic low back pain, unspecified back pain laterality, with sciatica presence unspecified    Chronic, continuous use of opioids   reviewed and without evidence of divergence.  Urine toxicology is up-to-date in on chart.  Pain contract is up-to-date on chart.  Refill request will be forwarded to PCP  Chronically on benzodiazepine therapy.ass    Anxiety   reviewed and without evidence of divergence.  Refill will be forwarded to PCP.    Chronic sinus complaints  -     fluticasone (FLONASE) 50 mcg/actuation nasal spray; 2 sprays (100 mcg total) by Each Nare route once daily.    Acute rhinosinusitis  -     predniSONE (DELTASONE) 20 MG tablet; Take 1 tablet (20 mg total) by mouth 2 (two) times daily.      Hypertension associated with diabetes  Continue current medications  Type 2 diabetes mellitus with stage 3 chronic kidney disease, with long-term current use of insulin    Continue current medications  BMI 42  Patient readiness: acceptance and barriers:readiness and social stressors    During the course of the visit the patient was educated  and counseled about the following:     Diabetes:  Reminded to bring in blood sugar diary at next visit.  Hypertension:   Medication: no change.  Obesity:   Diet interventions: qualitative changes (increase low-fat,  high-fiber foods).    Goals: Diabetes: Maintain Hemoglobin A1C below 7, Hypertension: Reduce Blood Pressure and Obesity: Reduce calorie intake and BMI    Did patient meet goals/outcomes: No    The following self management tools provided: declined    Patient Instructions (the written plan) was given to the patient/family.     Time spent with patient: 15 minutes    Barriers to medications present (no )    Adverse reactions to current medications (no)    Over the counter medications reviewed (Yes)

## 2018-12-06 RX ORDER — HYDROCODONE BITARTRATE AND ACETAMINOPHEN 7.5; 325 MG/1; MG/1
1 TABLET ORAL EVERY 6 HOURS PRN
Qty: 90 TABLET | Refills: 0 | Status: SHIPPED | OUTPATIENT
Start: 2018-12-06 | End: 2019-01-10 | Stop reason: SDUPTHER

## 2018-12-06 RX ORDER — ALPRAZOLAM 1 MG/1
TABLET ORAL
Qty: 30 TABLET | Refills: 0 | Status: SHIPPED | OUTPATIENT
Start: 2018-12-06 | End: 2019-01-10 | Stop reason: SDUPTHER

## 2018-12-11 ENCOUNTER — OFFICE VISIT (OUTPATIENT)
Dept: PODIATRY | Facility: CLINIC | Age: 73
End: 2018-12-11
Payer: MEDICARE

## 2018-12-11 VITALS
HEIGHT: 69 IN | BODY MASS INDEX: 43.07 KG/M2 | WEIGHT: 290.81 LBS | SYSTOLIC BLOOD PRESSURE: 146 MMHG | HEART RATE: 55 BPM | DIASTOLIC BLOOD PRESSURE: 77 MMHG

## 2018-12-11 DIAGNOSIS — L97.512 RIGHT FOOT ULCER, WITH FAT LAYER EXPOSED: Primary | ICD-10-CM

## 2018-12-11 DIAGNOSIS — E11.42 DIABETIC POLYNEUROPATHY ASSOCIATED WITH TYPE 2 DIABETES MELLITUS: ICD-10-CM

## 2018-12-11 DIAGNOSIS — I73.9 PERIPHERAL VASCULAR DISEASE: ICD-10-CM

## 2018-12-11 DIAGNOSIS — M79.642 LEFT HAND PAIN: Primary | ICD-10-CM

## 2018-12-11 PROCEDURE — 99999 PR PBB SHADOW E&M-EST. PATIENT-LVL III: CPT | Mod: PBBFAC,,, | Performed by: PODIATRIST

## 2018-12-11 PROCEDURE — 99499 UNLISTED E&M SERVICE: CPT | Mod: S$GLB,,, | Performed by: PODIATRIST

## 2018-12-11 PROCEDURE — 11042 DBRDMT SUBQ TIS 1ST 20SQCM/<: CPT | Mod: S$GLB,,, | Performed by: PODIATRIST

## 2018-12-11 NOTE — PROGRESS NOTES
Subjective:      Patient ID: Richard Bustos is a 73 y.o. male.    Chief Complaint: Foot Ulcer (right foot)    Foot ulcer right foot, aggravated by increased weight bearing, shoe gear, pressure.  Covering  Right foot wound with mepilex border with modest improvement.  Denies signs infection, pain.  Has been on feet less since fall about a month ago - sites this as reason for not presenting for wound care.  .     Review of Systems   Constitution: Negative for chills, diaphoresis, fever, malaise/fatigue and night sweats.   Cardiovascular: Negative for claudication, cyanosis, leg swelling and syncope.   Skin: Positive for poor wound healing. Negative for color change, dry skin, nail changes, rash, suspicious lesions and unusual hair distribution.   Musculoskeletal: Negative for falls, joint pain, joint swelling, muscle cramps, muscle weakness and stiffness.   Gastrointestinal: Negative for constipation, diarrhea, nausea and vomiting.   Neurological: Positive for sensory change. Negative for brief paralysis, disturbances in coordination, focal weakness, numbness, paresthesias and tremors.           Objective:      Physical Exam   Constitutional: He is oriented to person, place, and time. He appears well-developed and well-nourished. He is cooperative. No distress.   Cardiovascular:   Pulses:       Popliteal pulses are 2+ on the right side, and 2+ on the left side.        Dorsalis pedis pulses are 1+ on the right side, and 1+ on the left side.        Posterior tibial pulses are 1+ on the right side, and 1+ on the left side.   Capillary refill 3 seconds all toes/distal feet, all toes/both feet warm to touch.      Negative lymphadenopathy bilateral popliteal fossa and tarsal tunnel.      Negavie lower extremity edema bilateral.     Musculoskeletal:        Right ankle: He exhibits normal range of motion, no swelling, no ecchymosis, no deformity, no laceration and normal pulse. Achilles tendon normal. Achilles tendon exhibits  no pain, no defect and normal Chung's test results.   Patient has hammertoes of digits  2-5 bilateral                 partially reducible without symptom today.     Lymphadenopathy: No inguinal adenopathy noted on the right or left side.   Negative lymphadenopathy bilateral popliteal fossa and tarsal tunnel.    Negative lymphangitic streaking bilateral feet/ankles/legs.   Neurological: He is alert and oriented to person, place, and time. He has normal strength. He displays no atrophy and no tremor. A sensory deficit is present. He exhibits normal muscle tone. Gait normal.   Reflex Scores:       Patellar reflexes are 2+ on the right side and 2+ on the left side.       Achilles reflexes are 2+ on the right side and 2+ on the left side.  Diminished/loss of protective sensation all toes bilateral to 10 gram monofilament.     Skin: Skin is warm, dry and intact. Capillary refill takes 2 to 3 seconds. No abrasion, no bruising, no burn, no ecchymosis, no laceration, no lesion and no rash noted. He is not diaphoretic. No cyanosis or erythema. No pallor. Nails show no clubbing.   Wound anterior shin right and left closed today, epithelialized  without ulceration, drainage, pus, tracking, fluctuance, malodor, or cardinal signs infection.        Wound:  Plantar right 1st mtpj    Size:    Pre:[id number]  Post:  [id number]      Base:  Granular, pink with moderate serous/serosanaguinous drainage only.  No pus, tracking, fluctuance, malodor, or cardinal signs infection.    Borders:  Hyperkeratotic, changing to flat, pink, blanchable skin edges without undermining.     Psychiatric: He has a normal mood and affect.             Assessment:       Encounter Diagnoses   Name Primary?    Right foot ulcer, with fat layer exposed Yes    Diabetic polyneuropathy associated with type 2 diabetes mellitus     Peripheral vascular disease          Plan:       Richard was seen today for foot ulcer.    Diagnoses and all orders for this visit:    Right  foot ulcer, with fat layer exposed    Diabetic polyneuropathy associated with type 2 diabetes mellitus    Peripheral vascular disease      I counseled the patient on his conditions, their implications and medical management.    Dressed right foot wound with apertures x 2,  hydrofera blue kerlix.  - change same every other day.    Continue minimal ambulation right in sx shoe and left DM shoe/insert.    Adequate vitamin supplementation, protein intake, and hydration - discussed with patient.    Nails debride next week if criteria met.        Follow-up in about 1 week (around 12/18/2018).

## 2018-12-11 NOTE — PROCEDURES
Wound Debridement  Date/Time: 12/11/2018 10:36 AM  Performed by: Iván Torrez DPM  Authorized by: Iván Torrez DPM     Consent Done?:  Yes (Verbal)    Preparation: Patient was prepped and draped in usual sterile fashion    Local anesthesia used?: No      Wound Details:    Location:  Right foot    Location:  Right 1st Metatarsal Head    Type of Debridement:  Excisional       Length (cm):  0.4       Area (sq cm):  0.16       Width (cm):  0.4       Percent Debrided (%):  100       Depth (cm):  0.2       Total Area Debrided (sq cm):  0.16    Depth of debridement:  Subcutaneous tissue    Tissue debrided:  Dermis, Epidermis and Subcutaneous    Devitalized tissue debrided:  Biofilm and Callus    Instruments:  Blade    Bleeding:  Minimal  Hemostasis Achieved: Yes    Method Used:  Pressure  Patient tolerance:  Patient tolerated the procedure well with no immediate complications

## 2018-12-12 ENCOUNTER — TELEPHONE (OUTPATIENT)
Dept: ORTHOPEDICS | Facility: CLINIC | Age: 73
End: 2018-12-12

## 2018-12-12 NOTE — TELEPHONE ENCOUNTER
Called pt and advised unfortunately Dr. Camargo billed the patients insurance for a fracture so the pt can only see Dr. Camargo for that specific injury for up to 90 days after the charge was billed. Pt verbalized understanding and asked to reschedule appointment with Dr. Camargo to another day next week. Rescheduled patients appointment per request. Pt verbalized understanding.

## 2018-12-12 NOTE — TELEPHONE ENCOUNTER
----- Message from Kristen Delgado MA sent at 12/12/2018  9:40 AM CST -----  Contact: Daughter//Citlali Frye  Patient needs to see if the doctor can see him for his knee problem that Dr. Camargo was treating at tomorrow's appointment as well as his hand that he is originally coming in for.    Call Back# [phone]  Thanks

## 2018-12-13 ENCOUNTER — HOSPITAL ENCOUNTER (OUTPATIENT)
Dept: RADIOLOGY | Facility: HOSPITAL | Age: 73
Discharge: HOME | End: 2018-12-13
Attending: ORTHOPAEDIC SURGERY
Payer: MEDICARE

## 2018-12-13 ENCOUNTER — OFFICE VISIT (OUTPATIENT)
Dept: ORTHOPEDICS | Facility: CLINIC | Age: 73
End: 2018-12-13
Payer: MEDICARE

## 2018-12-13 VITALS
SYSTOLIC BLOOD PRESSURE: 139 MMHG | HEIGHT: 69 IN | BODY MASS INDEX: 42.95 KG/M2 | WEIGHT: 290 LBS | DIASTOLIC BLOOD PRESSURE: 63 MMHG | HEART RATE: 61 BPM

## 2018-12-13 DIAGNOSIS — M79.642 LEFT HAND PAIN: ICD-10-CM

## 2018-12-13 DIAGNOSIS — M25.562 LEFT KNEE PAIN, UNSPECIFIED CHRONICITY: Primary | ICD-10-CM

## 2018-12-13 DIAGNOSIS — S62.345D CLOSED NONDISPLACED FRACTURE OF BASE OF FOURTH METACARPAL BONE OF LEFT HAND WITH ROUTINE HEALING, SUBSEQUENT ENCOUNTER: Primary | ICD-10-CM

## 2018-12-13 PROCEDURE — 73130 X-RAY EXAM OF HAND: CPT | Mod: TC,PN,LT

## 2018-12-13 PROCEDURE — 99024 POSTOP FOLLOW-UP VISIT: CPT | Mod: S$GLB,,, | Performed by: ORTHOPAEDIC SURGERY

## 2018-12-13 PROCEDURE — 73130 X-RAY EXAM OF HAND: CPT | Mod: 26,LT,, | Performed by: RADIOLOGY

## 2018-12-13 PROCEDURE — 99999 PR PBB SHADOW E&M-EST. PATIENT-LVL III: CPT | Mod: PBBFAC,,, | Performed by: ORTHOPAEDIC SURGERY

## 2018-12-13 NOTE — PROGRESS NOTES
Past Medical History:   Diagnosis Date    Anemia     Anemia of other chronic disease 9/13/2017    Anemia, chronic renal failure 9/13/2017    Anemia, unspecified 9/13/2017    Anticoagulant long-term use     Aorta aneurysm     Arthritis     Atrial fibrillation     CAD (coronary artery disease)     CHF (congestive heart failure)     Chronic kidney disease     Clotting disorder 9/13/2017    Colon polyp     Diabetes mellitus     not on meds    Diabetes mellitus type II     Diverticulosis     Elevated PSA     Encounter for blood transfusion     Former smoker     GERD (gastroesophageal reflux disease)     Gout     Hx of colonic polyps     Hypercoagulable state     Hyperlipidemia     Hypertension     MI, old 2010    Myocardial infarction     9/2010    Osteomyelitis of ankle or foot 3/9/2017    Prostate cancer 06/2016    Sleep apnea     Squamous cell carcinoma 01/23/2018    Left helix, imiquimod    Venous stasis     Chronic       Past Surgical History:   Procedure Laterality Date    ABDOMINAL SURGERY      BLOCK-NERVE-MEDIAL BRANCH-LUMBAR Bilateral 7/13/2017    Per[facility] Nile [facility]y [facility]    BLOCK-NERVE-MEDIAL BRANCH-LUMBAR Bilateral 6/22/2017    Performed by Nile [facility] at [facility]    CARDIAC SURGERY      COLONOSCOPY  02/2011    diverticulosis with diverticula with clot, no active bleeding    COLONOSCOPY N/A 8/24/2016    Procedure: COLONOSCOPY;  Surgeon: Saroj Borjas MD;  Location: [facility];  Service: Endoscopy;  Laterality: N/A;    COLONOSCOPY N/A 8/24/2016    Performed by Saroj Borjas MD at [facility]    CYSTOSCOPY N/A 5/23/2016    Performed by Adeline Moss MD at [facility]    GASTRIC BYPASS  2/5/2008    IVC FILTER RETRIEVAL      JOINT REPLACEMENT  1996 and 2001    bi-lat hip replacement/Rt Hip and Lt Hip    RADIOFREQUENCY THERMOCOAGULATION (RFTC)-NERVE-MEDIAN BRANCH-LUMBAR Bilateral 8/3/2017    Performed by Nile [facility] at [facility]    ROTATOR  CUFF REPAIR      Rt shoulder    Stents  8/18/2010    x 3    TRANSRECTAL ULTRASOUND GUIDED PROSTATE BIOPSY N/A 8/1/2016    Performed by [facility] at [facility]    TRANSRECTAL ULTRASOUND GUIDED PROSTATE BIOPSY N/A 5/23/2016    Performed by [facility] at [facility]    UPPER GASTROINTESTINAL ENDOSCOPY  02/2011       Current Outpatient Medications   Medication Sig    alendronate-vitamin D3 (FOSAMAX PLUS D) 70 mg- 2,800 unit per tablet Take 1 tablet by mouth every 7 days.    allopurinol (ZYLOPRIM) 100 MG tablet TAKE 1 TABLET(100 MG) BY MOUTH EVERY DAY    ALPRAZolam (XANAX) 1 MG tablet TAKE 1 TABLET(1 MG) BY MOUTH EVERY EVENING    aspirin (ECOTRIN) 81 MG EC tablet Take 81 mg by mouth once daily.    atorvastatin (LIPITOR) 80 MG tablet TAKE 1 TABLET(80 MG) BY MOUTH EVERY DAY    calcitRIOL (ROCALTROL) 0.5 MCG Cap TK 1 C PO D    calcium carbonate (TUMS ULTRA) 400 mg calcium (1,000 mg) Chew Take 1 tablet (400 mg total) by mouth once daily.    carvedilol (COREG) 25 MG tablet TAKE 1 TABLET BY MOUTH TWICE DAILY WITH MEALS    ergocalciferol (ERGOCALCIFEROL) 50,000 unit Cap TK 1 C PO Q WEEK    escitalopram oxalate (LEXAPRO) 10 MG tablet TAKE 1 TABLET(10 MG) BY MOUTH EVERY DAY    ferrous sulfate 325 mg (65 mg iron) Tab tablet TAKE 1 TABLET BY MOUTH TWICE DAILY    fluticasone (FLONASE) 50 mcg/actuation nasal spray 2 sprays (100 mcg total) by Each Nare route once daily.    FOLIC ACID/MULTIVIT-MIN/LUTEIN (CENTRUM SILVER ORAL) Take 1 tablet by mouth once daily.    furosemide (LASIX) 40 MG tablet TAKE 1 TABLET BY MOUTH DAILY AS NEEDED    HYDROcodone-acetaminophen (NORCO) 7.5-325 mg per tablet Take 1 tablet by mouth every 6 (six) hours as needed for Pain.    imiquimod (ALDARA) 5 % cream APPLY TO THE AFFECTED AREA 5 TIMES A WEEK FOR 2 TO 4 WEEKS AS TOLORATED    L-METHYL-B6-B12 3-35-2 mg Tab TAKE 1 TABLET BY MOUTH TWICE DAILY    L-METHYL-B6-B12 3-35-2 mg Tab TAKE 1 TABLET BY MOUTH TWICE  DAILY    lisinopril (PRINIVIL,ZESTRIL) 40 MG tablet TAKE 1 TABLET BY MOUTH EVERY EVENING    MAGOX 400 mg tablet TAKE 1 TABLET BY MOUTH EVERY DAY    memantine (NAMENDA) 5 MG Tab Take 1 tablet (5 mg total) by mouth 2 (two) times daily.    mirabegron (MYRBETRIQ) 50 mg Tb24 Take 1 tablet (50 mg total) by mouth once daily.    nitroGLYCERIN 0.4 MG/DOSE TL SPRY (NITROLINGUAL) 400 mcg/spray spray PLACE 1 SPRAY UNDER THE TONGUE EVERY 5 MINUTES AS NEEDED FOR CHEST PAIN    omeprazole (PRILOSEC) 20 MG capsule TAKE 1 CAPSULE(20 MG) BY MOUTH EVERY DAY    predniSONE (DELTASONE) 20 MG tablet Take 1 tablet (20 mg total) by mouth 2 (two) times daily.    ranitidine (ZANTAC) 150 MG tablet TAKE 1 TABLET(150 MG) BY MOUTH TWICE DAILY    triamcinolone acetonide 0.1% (KENALOG) 0.1 % cream AAA bid    vit C-vit E-lutein-min-om-3 (OCUVITE) 956-72-1-150 mg-unit-mg-mg Cap Take 1 capsule by mouth once daily.    warfarin (COUMADIN) 5 MG tablet TAKE 1 TABLET BY MOUTH EVERY DAY (Patient taking differently: TAKE 1 TABLET BY MOUTH EVERY DAY EXCEPT 1 1/2 TABLETS ON WEDNESDAY)    hydrocortisone 2.5 % cream Apply topically 2 (two) times daily. for 10 days     No current facility-administered medications for this visit.        Review of patient's allergies indicates:   Allergen Reactions    Shellfish containing products Other (See Comments)     Other reaction(s): Gout       Family History   Problem Relation Age of Onset    Heart attack Mother     Heart attack Father     Heart attack Brother     Ulcerative colitis Daughter 35    Lupus Daughter     Colon cancer Neg Hx     Colon polyps Neg Hx     Crohn's disease Neg Hx     Melanoma Neg Hx     Psoriasis Neg Hx     Eczema Neg Hx        Social History     Socioeconomic History    Marital status:      Spouse name: Not on file    Number of children: Not on file    Years of education: Not on file    Highest education level: Not on file   Social Needs    Financial resource  strain: Not on file    Food insecurity - worry: Not on file    Food insecurity - inability: Not on file    Transportation needs - medical: Not on file    Transportation needs - non-medical: Not on file   Occupational History    Not on file   Tobacco Use    Smoking status: Former Smoker    Smokeless tobacco: Never Used    Tobacco comment: 45 yrs ago, only smoked 3-4 packs in his entire life as a teenager   Substance and Sexual Activity    Alcohol use: Yes     Comment: rare    Drug use: No    Sexual activity: Not on file   Other Topics Concern    Not on file   Social History Narrative    Not on file       Chief Complaint:   Chief Complaint   Patient presents with    Hand Pain     L hand 6wk f/u       History of present illness:  73 the-year-old morbidly obese male who was in a wheelchair comes in for left hand and hip pain. Patient fell on October 14, 2018.  Had a swollen and bruised left hand as well as a large hematoma on the left hip. Patient was on blood thinners.  Pain is a 2/10.  Hand feels much better.  Left hip shows no more bruising but still has a little hematoma present.      Review of Systems:  Musculoskeletal:  See HPI      Physical Examination:    Vital Signs:    Vitals:    12/13/18 1005   BP: 139/63   Pulse: 61       Body mass index is 42.83 kg/m².    This a well-developed, well nourished patient in no acute distress.  They are alert and oriented and cooperative to examination.  Pt.  Comes in in a wheelchair    Examination of his left hand shows resolved bruising and swelling.  Minimal Tenderness over the 4th metacarpal.  Able to make a fist without malrotation or deformity.  Intact light touch sensation and brisk capillary refill in all digits.        X-rays:  X-rays of the left hand are ordered and reviewed which show a nondisplaced fracture near the base of the 4th metacarpal.  Some scapholunate widening.  Significant callus formation noted.  X-rays of left hip is reviewed which show no  acute fracture.  Patient has bilateral hip replacements with some heterotopic ossification     Assessment::  Left 4th metacarpal fracture  Left hip contusion with hematoma    Plan:  I reviewed the finding with him today.  Okay to stop the brace.  Follow-up as needed.        This note was created using K[employer] voice recognition software that occasionally misinterpreted phrases or words.    Consult note is delivered via Epic messaging service.

## 2018-12-17 ENCOUNTER — OFFICE VISIT (OUTPATIENT)
Dept: ORTHOPEDICS | Facility: CLINIC | Age: 73
End: 2018-12-17
Payer: MEDICARE

## 2018-12-17 ENCOUNTER — HOSPITAL ENCOUNTER (OUTPATIENT)
Dept: RADIOLOGY | Facility: HOSPITAL | Age: 73
Discharge: HOME | End: 2018-12-17
Attending: ORTHOPAEDIC SURGERY
Payer: MEDICARE

## 2018-12-17 VITALS
DIASTOLIC BLOOD PRESSURE: 67 MMHG | SYSTOLIC BLOOD PRESSURE: 150 MMHG | WEIGHT: 289.88 LBS | HEIGHT: 69 IN | HEART RATE: 59 BPM | BODY MASS INDEX: 42.94 KG/M2

## 2018-12-17 DIAGNOSIS — S82.025D CLOSED NONDISPLACED LONGITUDINAL FRACTURE OF LEFT PATELLA WITH ROUTINE HEALING, SUBSEQUENT ENCOUNTER: Primary | ICD-10-CM

## 2018-12-17 DIAGNOSIS — M25.562 LEFT KNEE PAIN, UNSPECIFIED CHRONICITY: ICD-10-CM

## 2018-12-17 PROCEDURE — 99213 OFFICE O/P EST LOW 20 MIN: CPT | Mod: 24,S$GLB,, | Performed by: ORTHOPAEDIC SURGERY

## 2018-12-17 PROCEDURE — 73560 X-RAY EXAM OF KNEE 1 OR 2: CPT | Mod: TC,PN,LT

## 2018-12-17 PROCEDURE — 3078F DIAST BP <80 MM HG: CPT | Mod: CPTII,S$GLB,, | Performed by: ORTHOPAEDIC SURGERY

## 2018-12-17 PROCEDURE — 3077F SYST BP >= 140 MM HG: CPT | Mod: CPTII,S$GLB,, | Performed by: ORTHOPAEDIC SURGERY

## 2018-12-17 PROCEDURE — 1101F PT FALLS ASSESS-DOCD LE1/YR: CPT | Mod: CPTII,S$GLB,, | Performed by: ORTHOPAEDIC SURGERY

## 2018-12-17 PROCEDURE — 73560 X-RAY EXAM OF KNEE 1 OR 2: CPT | Mod: 26,LT,, | Performed by: RADIOLOGY

## 2018-12-17 PROCEDURE — 99999 PR PBB SHADOW E&M-EST. PATIENT-LVL III: CPT | Mod: PBBFAC,,, | Performed by: ORTHOPAEDIC SURGERY

## 2018-12-17 RX ORDER — CLOPIDOGREL BISULFATE 75 MG/1
TABLET ORAL
COMMUNITY
Start: 2018-12-12 | End: 2019-03-18 | Stop reason: SDUPTHER

## 2018-12-18 ENCOUNTER — PATIENT MESSAGE (OUTPATIENT)
Dept: NEUROLOGY | Facility: CLINIC | Age: 73
End: 2018-12-18

## 2018-12-18 ENCOUNTER — OFFICE VISIT (OUTPATIENT)
Dept: NEUROLOGY | Facility: CLINIC | Age: 73
End: 2018-12-18
Payer: MEDICARE

## 2018-12-18 ENCOUNTER — OFFICE VISIT (OUTPATIENT)
Dept: PODIATRY | Facility: CLINIC | Age: 73
End: 2018-12-18
Payer: MEDICARE

## 2018-12-18 VITALS
WEIGHT: 288 LBS | BODY MASS INDEX: 42.65 KG/M2 | HEART RATE: 53 BPM | SYSTOLIC BLOOD PRESSURE: 120 MMHG | HEIGHT: 69 IN | DIASTOLIC BLOOD PRESSURE: 69 MMHG

## 2018-12-18 VITALS
HEART RATE: 61 BPM | SYSTOLIC BLOOD PRESSURE: 140 MMHG | BODY MASS INDEX: 42.8 KG/M2 | WEIGHT: 289 LBS | HEIGHT: 69 IN | DIASTOLIC BLOOD PRESSURE: 74 MMHG

## 2018-12-18 DIAGNOSIS — N18.30 CKD (CHRONIC KIDNEY DISEASE) STAGE 3, GFR 30-59 ML/MIN: ICD-10-CM

## 2018-12-18 DIAGNOSIS — E11.42 DIABETIC POLYNEUROPATHY ASSOCIATED WITH TYPE 2 DIABETES MELLITUS: ICD-10-CM

## 2018-12-18 DIAGNOSIS — D50.9 IRON DEFICIENCY ANEMIA: ICD-10-CM

## 2018-12-18 DIAGNOSIS — I73.9 PERIPHERAL VASCULAR DISEASE: ICD-10-CM

## 2018-12-18 DIAGNOSIS — B35.1 ONYCHOMYCOSIS DUE TO DERMATOPHYTE: ICD-10-CM

## 2018-12-18 DIAGNOSIS — G47.33 OBSTRUCTIVE SLEEP APNEA SYNDROME: Chronic | ICD-10-CM

## 2018-12-18 DIAGNOSIS — F41.9 ANXIETY: ICD-10-CM

## 2018-12-18 DIAGNOSIS — I25.10 CORONARY ARTERY DISEASE INVOLVING NATIVE CORONARY ARTERY OF NATIVE HEART WITHOUT ANGINA PECTORIS: Chronic | ICD-10-CM

## 2018-12-18 DIAGNOSIS — E78.00 PURE HYPERCHOLESTEROLEMIA: ICD-10-CM

## 2018-12-18 DIAGNOSIS — G31.84 MCI (MILD COGNITIVE IMPAIRMENT): Primary | ICD-10-CM

## 2018-12-18 DIAGNOSIS — L97.512 RIGHT FOOT ULCER, WITH FAT LAYER EXPOSED: Primary | ICD-10-CM

## 2018-12-18 DIAGNOSIS — I15.2 HYPERTENSION ASSOCIATED WITH DIABETES: ICD-10-CM

## 2018-12-18 DIAGNOSIS — I48.0 PAROXYSMAL ATRIAL FIBRILLATION: ICD-10-CM

## 2018-12-18 DIAGNOSIS — E11.59 HYPERTENSION ASSOCIATED WITH DIABETES: ICD-10-CM

## 2018-12-18 PROCEDURE — 1101F PT FALLS ASSESS-DOCD LE1/YR: CPT | Mod: CPTII,S$GLB,, | Performed by: PODIATRIST

## 2018-12-18 PROCEDURE — 11721 DEBRIDE NAIL 6 OR MORE: CPT | Mod: Q9,S$GLB,, | Performed by: PODIATRIST

## 2018-12-18 PROCEDURE — 1101F PT FALLS ASSESS-DOCD LE1/YR: CPT | Mod: CPTII,S$GLB,, | Performed by: PSYCHIATRY & NEUROLOGY

## 2018-12-18 PROCEDURE — 99214 OFFICE O/P EST MOD 30 MIN: CPT | Mod: S$GLB,,, | Performed by: PSYCHIATRY & NEUROLOGY

## 2018-12-18 PROCEDURE — 3077F SYST BP >= 140 MM HG: CPT | Mod: CPTII,S$GLB,, | Performed by: PODIATRIST

## 2018-12-18 PROCEDURE — 99212 OFFICE O/P EST SF 10 MIN: CPT | Mod: 25,S$GLB,, | Performed by: PODIATRIST

## 2018-12-18 PROCEDURE — 3074F SYST BP LT 130 MM HG: CPT | Mod: CPTII,S$GLB,, | Performed by: PSYCHIATRY & NEUROLOGY

## 2018-12-18 PROCEDURE — 3078F DIAST BP <80 MM HG: CPT | Mod: CPTII,S$GLB,, | Performed by: PODIATRIST

## 2018-12-18 PROCEDURE — 99999 PR PBB SHADOW E&M-EST. PATIENT-LVL III: CPT | Mod: PBBFAC,,, | Performed by: PODIATRIST

## 2018-12-18 PROCEDURE — 3078F DIAST BP <80 MM HG: CPT | Mod: CPTII,S$GLB,, | Performed by: PSYCHIATRY & NEUROLOGY

## 2018-12-18 PROCEDURE — 99999 PR PBB SHADOW E&M-EST. PATIENT-LVL III: CPT | Mod: PBBFAC,,, | Performed by: PSYCHIATRY & NEUROLOGY

## 2018-12-18 PROCEDURE — 3044F HG A1C LEVEL LT 7.0%: CPT | Mod: CPTII,S$GLB,, | Performed by: PSYCHIATRY & NEUROLOGY

## 2018-12-18 PROCEDURE — 3044F HG A1C LEVEL LT 7.0%: CPT | Mod: CPTII,S$GLB,, | Performed by: PODIATRIST

## 2018-12-18 RX ORDER — FERROUS SULFATE 325(65) MG
TABLET ORAL
Qty: 180 TABLET | Refills: 0 | Status: SHIPPED | OUTPATIENT
Start: 2018-12-18 | End: 2019-03-12 | Stop reason: SDUPTHER

## 2018-12-18 NOTE — PROGRESS NOTES
Subjective:      Patient ID: Richard Bustos is a 73 y.o. male.    Chief Complaint: Foot Ulcer (right) and Diabetic Foot Exam ([facility] 12/05/2018)    Foot ulcer right foot, aggravated by increased weight bearing, shoe gear, pressure.  Covering  Right foot wound with hydrofera blue and apertures with good improvement.  Denies signs infection, pain.      CC2 thick long misshapen toenails all ten toes.  Gradual onset, worsening over past several weeks, aggravated by increased weight bearing, shoe gear, pressure.  Periodic debridement helps symptoms.      Review of Systems   Constitution: Negative for chills, diaphoresis, fever, malaise/fatigue and night sweats.   Cardiovascular: Positive for leg swelling. Negative for claudication, cyanosis and syncope.   Skin: Positive for nail changes and poor wound healing. Negative for color change, dry skin, rash, suspicious lesions and unusual hair distribution.   Musculoskeletal: Negative for falls, joint pain, joint swelling, muscle cramps, muscle weakness and stiffness.   Gastrointestinal: Negative for constipation, diarrhea, nausea and vomiting.   Neurological: Positive for paresthesias and sensory change. Negative for brief paralysis, disturbances in coordination, focal weakness, numbness and tremors.           Objective:      Physical Exam   Constitutional: He is oriented to person, place, and time. He appears well-developed and well-nourished. He is cooperative. No distress.   Cardiovascular:   Pulses:       Popliteal pulses are 2+ on the right side, and 2+ on the left side.        Dorsalis pedis pulses are 1+ on the right side, and 1+ on the left side.        Posterior tibial pulses are 1+ on the right side, and 1+ on the left side.   Capillary refill 3 seconds all toes/distal feet, all toes/both feet warm to touch.      Negative lymphadenopathy bilateral popliteal fossa and tarsal tunnel.     <2+ pitting lower extremity edema bilateral.     Musculoskeletal:         Right ankle: He exhibits normal range of motion, no swelling, no ecchymosis, no deformity, no laceration and normal pulse. Achilles tendon normal. Achilles tendon exhibits no pain, no defect and normal Chung's test results.   Patient has hammertoes of digits  2-5 bilateral                 partially reducible without symptom today.     Lymphadenopathy: No inguinal adenopathy noted on the right or left side.   Negative lymphadenopathy bilateral popliteal fossa and tarsal tunnel.    Negative lymphangitic streaking bilateral feet/ankles/legs.   Neurological: He is alert and oriented to person, place, and time. He has normal strength. He displays no atrophy and no tremor. A sensory deficit is present. He exhibits normal muscle tone. Gait normal.   Reflex Scores:       Patellar reflexes are 2+ on the right side and 2+ on the left side.       Achilles reflexes are 2+ on the right side and 2+ on the left side.  Diminished/loss of protective sensation all toes bilateral to 10 gram monofilament.    Paresthesias, and burning bilateral feet with no clearly identified trigger or source.     Skin: Skin is warm, dry and intact. Capillary refill takes 2 to 3 seconds. No abrasion, no bruising, no burn, no ecchymosis, no laceration, no lesion and no rash noted. He is not diaphoretic. No cyanosis or erythema. No pallor. Nails show no clubbing.         Wound plantar right 1st mtpj closed today, epithelialized  without ulceration, drainage, pus, tracking, fluctuance, malodor, or cardinal signs infection.    Hyperpigmentation right and left pretibial surfaces.    Otherwise, Skin thin, shiny, atrophic, with decreased density and distribution of pedal hair bilateral, but without hyperpigmentation, minerva discoloration,  ulcers, masses, nodules or cords palpated bilateral feet and legs.      Toenails 1st, 2nd, 3rd, 4th, 5th  bilateral are hypertrophic thickened 2-3 mm, dystrophic, discolored tanish brown with tan, gray crumbly subungual  debris.  Tender to distal nail plate pressure, without periungual skin abnormality of each.       Psychiatric: He has a normal mood and affect.             Assessment:       Encounter Diagnoses   Name Primary?    Right foot ulcer, with fat layer exposed Yes    Diabetic polyneuropathy associated with type 2 diabetes mellitus     Peripheral vascular disease     Onychomycosis due to dermatophyte          Plan:       Richard was seen today for foot ulcer and diabetic foot exam.    Diagnoses and all orders for this visit:    Right foot ulcer, with fat layer exposed    Diabetic polyneuropathy associated with type 2 diabetes mellitus    Peripheral vascular disease    Onychomycosis due to dermatophyte      I counseled the patient on his conditions, their implications and medical management.    Dressed right foot wound with apertures x 2,  hydrofera blue kerlix.  - change same every other day.    Continue minimal ambulation right in sx shoe and left DM shoe/insert.    Adequate vitamin supplementation, protein intake, and hydration - discussed with patient.    With the patient's permission, I debrided all ten toenails with a sterile nipper and curette, removing all offending nail and debris.  Patient tolerated the procedure well and related significant relief.    Continue penlac.          Follow-up in about 1 week (around 12/25/2018).

## 2018-12-18 NOTE — PATIENT INSTRUCTIONS
Discussed with patient and caregiver.  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  He has multiple vascular risk factors as noted above.  Control of vascular risk factors specially diabetes is important.  Fall precautions discussed.  Continue Namenda 5 mg twice a day.
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
## 2018-12-27 NOTE — PROGRESS NOTES
Past Medical History:   Diagnosis Date    Anemia     Anemia of other chronic disease 9/13/2017    Anemia, chronic renal failure 9/13/2017    Anemia, unspecified 9/13/2017    Anticoagulant long-term use     Aorta aneurysm     Arthritis     Atrial fibrillation     CAD (coronary artery disease)     CHF (congestive heart failure)     Chronic kidney disease     Clotting disorder 9/13/2017    Colon polyp     Diabetes mellitus     not on meds    Diabetes mellitus type II     Diverticulosis     Elevated PSA     Encounter for blood transfusion     Former smoker     GERD (gastroesophageal reflux disease)     Gout     Hx of colonic polyps     Hypercoagulable state     Hyperlipidemia     Hypertension     MI, old 2010    Myocardial infarction     9/2010    Osteomyelitis of ankle or foot 3/9/2017    Prostate cancer 06/2016    Sleep apnea     Squamous cell carcinoma 01/23/2018    Left helix, imiquimod    Venous stasis     Chronic       Past Surgical History:   Procedure Laterality Date    ABDOMINAL SURGERY      BLOCK-NERVE-MEDIAL BRANCH-LUMBAR Bilateral 7/13/2017    [facility] at [facility]    BLOCK-NERVE-MEDIAL BRANCH-LUMBAR Bilateral 6/22/2017    [facility] at [facility]    CARDIAC SURGERY      COLONOSCOPY  02/2011    diverticulosis with diverticula with clot, no active bleeding    COLONOSCOPY N/A 8/24/2016    [facility]    CYSTOSCOPY N/A 5/23/2016    [facility] Adeline Moss [facility] at [facility]    GASTRIC BYPASS  2/5/2008    IVC FILTER RETRIEVAL      JOINT REPLACEMENT  1996 and 2001    bi-lat hip replacement/Rt Hip and Lt Hip    RADIOFREQUENCY THERMOCOAGULATION (RFTC)-NERVE-MEDIAN BRANCH-LUMBAR Bilateral 8/3/2017    [facility] at [facility]    ROTATOR CUFF REPAIR      Rt shoulder    Stents  8/18/2010    x 3    TRANSRECTAL ULTRASOUND GUIDED PROSTATE BIOPSY N/A 8/1/2016    Per[facility] Adeline KAPLAN  [facility]    TRANSRECTAL ULTRASOUND GUIDED PROSTATE BIOPSY N/A 5/23/2016    [facility]    UPPER GASTROINTESTINAL ENDOSCOPY  02/2011       Current Outpatient Medications   Medication Sig    alendronate-vitamin D3 (FOSAMAX PLUS D) 70 mg- 2,800 unit per tablet Take 1 tablet by mouth every 7 days.    allopurinol (ZYLOPRIM) 100 MG tablet TAKE 1 TABLET(100 MG) BY MOUTH EVERY DAY    ALPRAZolam (XANAX) 1 MG tablet TAKE 1 TABLET(1 MG) BY MOUTH EVERY EVENING    aspirin (ECOTRIN) 81 MG EC tablet Take 81 mg by mouth once daily.    atorvastatin (LIPITOR) 80 MG tablet TAKE 1 TABLET(80 MG) BY MOUTH EVERY DAY    calcitRIOL (ROCALTROL) 0.5 MCG Cap TK 1 C PO D    calcium carbonate (TUMS ULTRA) 400 mg calcium (1,000 mg) Chew Take 1 tablet (400 mg total) by mouth once daily.    carvedilol (COREG) 25 MG tablet TAKE 1 TABLET BY MOUTH TWICE DAILY WITH MEALS    ergocalciferol (ERGOCALCIFEROL) 50,000 unit Cap TK 1 C PO Q WEEK    escitalopram oxalate (LEXAPRO) 10 MG tablet TAKE 1 TABLET(10 MG) BY MOUTH EVERY DAY    fluticasone (FLONASE) 50 mcg/actuation nasal spray 2 sprays (100 mcg total) by Each Nare route once daily.    FOLIC ACID/MULTIVIT-MIN/LUTEIN (CENTRUM SILVER ORAL) Take 1 tablet by mouth once daily.    furosemide (LASIX) 40 MG tablet TAKE 1 TABLET BY MOUTH DAILY AS NEEDED    HYDROcodone-acetaminophen (NORCO) 7.5-325 mg per tablet Take 1 tablet by mouth every 6 (six) hours as needed for Pain.    imiquimod (ALDARA) 5 % cream APPLY TO THE AFFECTED AREA 5 TIMES A WEEK FOR 2 TO 4 WEEKS AS TOLORATED    L-METHYL-B6-B12 3-35-2 mg Tab TAKE 1 TABLET BY MOUTH TWICE DAILY    L-METHYL-B6-B12 3-35-2 mg Tab TAKE 1 TABLET BY MOUTH TWICE DAILY    lisinopril (PRINIVIL,ZESTRIL) 40 MG tablet TAKE 1 TABLET BY MOUTH EVERY EVENING    MAGOX 400 mg tablet TAKE 1 TABLET BY MOUTH EVERY DAY    memantine (NAMENDA) 5 MG Tab Take 1 tablet (5 mg total) by mouth 2 (two) times daily.     mirabegron (MYRBETRIQ) 50 mg Tb24 Take 1 tablet (50 mg total) by mouth once daily.    nitroGLYCERIN 0.4 MG/DOSE TL SPRY (NITROLINGUAL) 400 mcg/spray spray PLACE 1 SPRAY UNDER THE TONGUE EVERY 5 MINUTES AS NEEDED FOR CHEST PAIN    omeprazole (PRILOSEC) 20 MG capsule TAKE 1 CAPSULE(20 MG) BY MOUTH EVERY DAY    predniSONE (DELTASONE) 20 MG tablet Take 1 tablet (20 mg total) by mouth 2 (two) times daily.    ranitidine (ZANTAC) 150 MG tablet TAKE 1 TABLET(150 MG) BY MOUTH TWICE DAILY    triamcinolone acetonide 0.1% (KENALOG) 0.1 % cream AAA bid    vit C-vit E-lutein-min-om-3 (OCUVITE) 092-04-7-150 mg-unit-mg-mg Cap Take 1 capsule by mouth once daily.    warfarin (COUMADIN) 5 MG tablet TAKE 1 TABLET BY MOUTH EVERY DAY (Patient taking differently: TAKE 1 TABLET BY MOUTH EVERY DAY EXCEPT 1 1/2 TABLETS ON WEDNESDAY)    clopidogrel (PLAVIX) 75 mg tablet     ferrous sulfate (FEOSOL) 325 mg (65 mg iron) Tab tablet TAKE 1 TABLET BY MOUTH TWICE DAILY    hydrocortisone 2.5 % cream Apply topically 2 (two) times daily. for 10 days     No current facility-administered medications for this visit.        Review of patient's allergies indicates:   Allergen Reactions    Shellfish containing products Other (See Comments)     Other reaction(s): Gout       Family History   Problem Relation Age of Onset    Heart attack Mother     Heart attack Father     Heart attack Brother     Ulcerative colitis Daughter 35    Lupus Daughter     Colon cancer Neg Hx     Colon polyps Neg Hx     Crohn's disease Neg Hx     Melanoma Neg Hx     Psoriasis Neg Hx     Eczema Neg Hx        Social History     Socioeconomic History    Marital status:      Spouse name: Not on file    Number of children: Not on file    Years of education: Not on file    Highest education level: Not on file   Social Needs    Financial resource strain: Not on file    Food insecurity - worry: Not on file    Food insecurity - inability: Not on file  "   Transportation needs - medical: Not on file    Transportation needs - non-medical: Not on file   Occupational History    Not on file   Tobacco Use    Smoking status: Former Smoker    Smokeless tobacco: Never Used    Tobacco comment: 45 yrs ago, only smoked 3-4 packs in his entire life as a teenager   Substance and Sexual Activity    Alcohol use: Yes     Comment: rare    Drug use: No    Sexual activity: Not on file   Other Topics Concern    Not on file   Social History Narrative    Not on file       Chief Complaint:   Chief Complaint   Patient presents with    Left Knee - Injury       Consulting Physician: No ref. provider found    History of present illness:    This is a 73 y.o. male who complains of left knee pain following a fall 11-12-18 while getting in the car.  He puts his pain at 6/10.  He is able to bear some weight in brace.    Review of Systems:    Constitution: Denies chills, fever, and sweats.  HENT: Denies headaches or blurry vision.  Cardiovascular: Denies chest pain or irregular heart beat.  Respiratory: Denies cough or shortness of breath.  Gastrointestinal: Denies abdominal pain, nausea, or vomiting.  Musculoskeletal:  Denies muscle cramps.  Neurological: Denies dizziness or focal weakness.  Psychiatric/Behavioral: Normal mental status.  Hematologic/Lymphatic: Denies bleeding problem or easy bruising/bleeding.  Skin: Denies rash or suspicious lesions.    Examination:    Vital Signs:    Vitals:    12/17/18 1027   BP: (!) 150/67   Pulse: (!) 59   Weight: 131.5 kg (289 lb 14.5 oz)   Height: 5' 9" (1.753 m)   PainSc:   6       Body mass index is 42.81 kg/m².    This a well-developed, well nourished patient in no acute distress.    Alert and oriented x 3 and cooperative to examination.       Physical Exam: Left Knee Exam    Gait   antalgic    Skin  Rash:   None  Scars:   None    Inspection  Erythema:  None  Bruising:  None  Effusion:  Mild  Masses:  None  Lymphadenopathy: None    Range of " Motion: Not tested due to fracture    Medial Joint : No  Lateral Joint : No    Patellofemoral Tenderness: Yes    Lachman:  Normal  Anterior Drawer: Normal  Posterior Drawer: Normal    Varus Stress:  Stable  Valgus Stress:  Stable    Strength:  Not tested due to fracture    Pulses:  Palpable distal    Sensation:  Intact        Imaging: X-rays ordered and images interpreted today personally by me of left knee shows a vertical nondisplaced fracture of the left patella.       Assessment: Closed nondisplaced longitudinal fracture of left patella with routine healing, subsequent encounter        Plan:  He is not wearing his brace.  Will plan to see him back in 4 weeks time.  He is still not to bend the knee and should be in brace.    DISCLAIMER: This note may have been dictated using voice recognition software and may contain grammatical errors.     NOTE: Consult report sent to referring provider via [facility] EMR.

## 2018-12-28 ENCOUNTER — OFFICE VISIT (OUTPATIENT)
Dept: PODIATRY | Facility: CLINIC | Age: 73
End: 2018-12-28
Payer: MEDICARE

## 2018-12-28 VITALS
BODY MASS INDEX: 42.65 KG/M2 | HEART RATE: 70 BPM | DIASTOLIC BLOOD PRESSURE: 69 MMHG | HEIGHT: 69 IN | SYSTOLIC BLOOD PRESSURE: 120 MMHG | WEIGHT: 288 LBS

## 2018-12-28 DIAGNOSIS — E11.42 DIABETIC POLYNEUROPATHY ASSOCIATED WITH TYPE 2 DIABETES MELLITUS: Primary | ICD-10-CM

## 2018-12-28 DIAGNOSIS — I73.9 PERIPHERAL VASCULAR DISEASE: ICD-10-CM

## 2018-12-28 DIAGNOSIS — Z87.898 HISTORY OF ULCERATION: ICD-10-CM

## 2018-12-28 PROCEDURE — 99999 PR PBB SHADOW E&M-EST. PATIENT-LVL III: CPT | Mod: PBBFAC,,, | Performed by: PODIATRIST

## 2018-12-28 PROCEDURE — 3074F SYST BP LT 130 MM HG: CPT | Mod: CPTII,S$GLB,, | Performed by: PODIATRIST

## 2018-12-28 PROCEDURE — 3078F DIAST BP <80 MM HG: CPT | Mod: CPTII,S$GLB,, | Performed by: PODIATRIST

## 2018-12-28 PROCEDURE — 1101F PT FALLS ASSESS-DOCD LE1/YR: CPT | Mod: CPTII,S$GLB,, | Performed by: PODIATRIST

## 2018-12-28 PROCEDURE — 3044F HG A1C LEVEL LT 7.0%: CPT | Mod: CPTII,S$GLB,, | Performed by: PODIATRIST

## 2018-12-28 PROCEDURE — 99212 OFFICE O/P EST SF 10 MIN: CPT | Mod: S$GLB,,, | Performed by: PODIATRIST

## 2018-12-28 NOTE — PROGRESS NOTES
Subjective:      Patient ID: Richard Bustos is a 73 y.o. male.    Chief Complaint: Foot Ulcer (right foot)    Foot ulcer right foot, aggravated by increased weight bearing, shoe gear, pressure.  Covering  Right foot wound with hydrofera blue and apertures with good improvement.  Denies signs infection, pain.        Review of Systems   Constitution: Negative for chills, diaphoresis, fever, malaise/fatigue and night sweats.   Cardiovascular: Positive for leg swelling. Negative for claudication, cyanosis and syncope.   Skin: Positive for nail changes and poor wound healing. Negative for color change, dry skin, rash, suspicious lesions and unusual hair distribution.   Musculoskeletal: Negative for falls, joint pain, joint swelling, muscle cramps, muscle weakness and stiffness.   Gastrointestinal: Negative for constipation, diarrhea, nausea and vomiting.   Neurological: Positive for paresthesias and sensory change. Negative for brief paralysis, disturbances in coordination, focal weakness, numbness and tremors.           Objective:      Physical Exam   Constitutional: He is oriented to person, place, and time. He appears well-developed and well-nourished. He is cooperative. No distress.   Cardiovascular:   Pulses:       Popliteal pulses are 2+ on the right side, and 2+ on the left side.        Dorsalis pedis pulses are 1+ on the right side, and 1+ on the left side.        Posterior tibial pulses are 1+ on the right side, and 1+ on the left side.   Capillary refill 3 seconds all toes/distal feet, all toes/both feet warm to touch.      Negative lymphadenopathy bilateral popliteal fossa and tarsal tunnel.     <2+ pitting lower extremity edema bilateral.     Musculoskeletal:        Right ankle: He exhibits normal range of motion, no swelling, no ecchymosis, no deformity, no laceration and normal pulse. Achilles tendon normal. Achilles tendon exhibits no pain, no defect and normal Chung's test results.   Patient has  hammertoes of digits  2-5 bilateral                 partially reducible without symptom today.     Lymphadenopathy: No inguinal adenopathy noted on the right or left side.   Negative lymphadenopathy bilateral popliteal fossa and tarsal tunnel.    Negative lymphangitic streaking bilateral feet/ankles/legs.   Neurological: He is alert and oriented to person, place, and time. He has normal strength. He displays no atrophy and no tremor. A sensory deficit is present. He exhibits normal muscle tone. Gait normal.   Reflex Scores:       Patellar reflexes are 2+ on the right side and 2+ on the left side.       Achilles reflexes are 2+ on the right side and 2+ on the left side.  Diminished/loss of protective sensation all toes bilateral to 10 gram monofilament.    Paresthesias, and burning bilateral feet with no clearly identified trigger or source.     Skin: Skin is warm, dry and intact. Capillary refill takes 2 to 3 seconds. No abrasion, no bruising, no burn, no ecchymosis, no laceration, no lesion and no rash noted. He is not diaphoretic. No cyanosis or erythema. No pallor. Nails show no clubbing.         Wound plantar right 1st mtpj remains closed today, epithelialized  without ulceration, drainage, pus, tracking, fluctuance, malodor, or cardinal signs infection.    Hyperpigmentation right and left pretibial surfaces.    Otherwise, Skin thin, shiny, atrophic, with decreased density and distribution of pedal hair bilateral, but without hyperpigmentation, minerva discoloration,  ulcers, masses, nodules or cords palpated bilateral feet and legs.       Psychiatric: He has a normal mood and affect.             Assessment:       Encounter Diagnoses   Name Primary?    Diabetic polyneuropathy associated with type 2 diabetes mellitus Yes    Peripheral vascular disease     History of ulceration          Plan:       Richard was seen today for foot ulcer.    Diagnoses and all orders for this visit:    Diabetic polyneuropathy  associated with type 2 diabetes mellitus    Peripheral vascular disease    History of ulceration      I counseled the patient on his conditions, their implications and medical management.    Dressed right foot wound with apertures x 2,  hydrofera blue kerlix.  - change same every other day.    Continue minimal ambulation right in sx shoe and left DM shoe/insert.    Rx new DM shoes, custom inserts.    Adequate vitamin supplementation, protein intake, and hydration - discussed with patient.            Follow-up in about 2 weeks (around 1/11/2019).

## 2019-01-09 DIAGNOSIS — R41.3 MEMORY LOSS: ICD-10-CM

## 2019-01-09 DIAGNOSIS — G31.84 MCI (MILD COGNITIVE IMPAIRMENT): ICD-10-CM

## 2019-01-09 RX ORDER — MEMANTINE HYDROCHLORIDE 5 MG/1
TABLET ORAL
Qty: 60 TABLET | Refills: 3 | Status: SHIPPED | OUTPATIENT
Start: 2019-01-09 | End: 2019-06-01 | Stop reason: SDUPTHER

## 2019-01-10 DIAGNOSIS — F41.9 ANXIETY: ICD-10-CM

## 2019-01-10 DIAGNOSIS — G89.29 CHRONIC LOW BACK PAIN, UNSPECIFIED BACK PAIN LATERALITY, WITH SCIATICA PRESENCE UNSPECIFIED: ICD-10-CM

## 2019-01-10 DIAGNOSIS — M54.5 CHRONIC LOW BACK PAIN, UNSPECIFIED BACK PAIN LATERALITY, WITH SCIATICA PRESENCE UNSPECIFIED: ICD-10-CM

## 2019-01-10 RX ORDER — OMEPRAZOLE 20 MG/1
CAPSULE, DELAYED RELEASE ORAL
Qty: 90 CAPSULE | Refills: 3 | Status: SHIPPED | OUTPATIENT
Start: 2019-01-10 | End: 2020-01-22

## 2019-01-11 DIAGNOSIS — M25.562 LEFT KNEE PAIN, UNSPECIFIED CHRONICITY: Primary | ICD-10-CM

## 2019-01-12 RX ORDER — HYDROCODONE BITARTRATE AND ACETAMINOPHEN 7.5; 325 MG/1; MG/1
1 TABLET ORAL EVERY 6 HOURS PRN
Qty: 90 TABLET | Refills: 0 | Status: SHIPPED | OUTPATIENT
Start: 2019-01-12 | End: 2019-02-12 | Stop reason: SDUPTHER

## 2019-01-12 RX ORDER — ALPRAZOLAM 1 MG/1
TABLET ORAL
Qty: 30 TABLET | Refills: 0 | Status: SHIPPED | OUTPATIENT
Start: 2019-01-12 | End: 2019-02-12 | Stop reason: SDUPTHER

## 2019-01-14 ENCOUNTER — HOSPITAL ENCOUNTER (OUTPATIENT)
Dept: RADIOLOGY | Facility: HOSPITAL | Age: 74
Discharge: HOME | End: 2019-01-14
Attending: ORTHOPAEDIC SURGERY
Payer: MEDICARE

## 2019-01-14 ENCOUNTER — OFFICE VISIT (OUTPATIENT)
Dept: ORTHOPEDICS | Facility: CLINIC | Age: 74
End: 2019-01-14
Payer: MEDICARE

## 2019-01-14 ENCOUNTER — PATIENT MESSAGE (OUTPATIENT)
Dept: UROLOGY | Facility: CLINIC | Age: 74
End: 2019-01-14

## 2019-01-14 ENCOUNTER — PATIENT MESSAGE (OUTPATIENT)
Dept: FAMILY MEDICINE | Facility: CLINIC | Age: 74
End: 2019-01-14

## 2019-01-14 VITALS
DIASTOLIC BLOOD PRESSURE: 67 MMHG | SYSTOLIC BLOOD PRESSURE: 139 MMHG | HEART RATE: 56 BPM | BODY MASS INDEX: 42.65 KG/M2 | WEIGHT: 288 LBS | HEIGHT: 69 IN

## 2019-01-14 DIAGNOSIS — S82.025D CLOSED NONDISPLACED LONGITUDINAL FRACTURE OF LEFT PATELLA WITH ROUTINE HEALING, SUBSEQUENT ENCOUNTER: Primary | ICD-10-CM

## 2019-01-14 DIAGNOSIS — M25.562 LEFT KNEE PAIN, UNSPECIFIED CHRONICITY: ICD-10-CM

## 2019-01-14 PROCEDURE — 73560 XR KNEE 1 OR 2 VIEW LEFT: ICD-10-PCS | Mod: 26,LT,, | Performed by: RADIOLOGY

## 2019-01-14 PROCEDURE — 3078F PR MOST RECENT DIASTOLIC BLOOD PRESSURE < 80 MM HG: ICD-10-PCS | Mod: CPTII,S$GLB,, | Performed by: ORTHOPAEDIC SURGERY

## 2019-01-14 PROCEDURE — 99213 OFFICE O/P EST LOW 20 MIN: CPT | Mod: S$GLB,,, | Performed by: ORTHOPAEDIC SURGERY

## 2019-01-14 PROCEDURE — 3078F DIAST BP <80 MM HG: CPT | Mod: CPTII,S$GLB,, | Performed by: ORTHOPAEDIC SURGERY

## 2019-01-14 PROCEDURE — 1101F PR PT FALLS ASSESS DOC 0-1 FALLS W/OUT INJ PAST YR: ICD-10-PCS | Mod: CPTII,S$GLB,, | Performed by: ORTHOPAEDIC SURGERY

## 2019-01-14 PROCEDURE — 73560 X-RAY EXAM OF KNEE 1 OR 2: CPT | Mod: 26,LT,, | Performed by: RADIOLOGY

## 2019-01-14 PROCEDURE — 1101F PT FALLS ASSESS-DOCD LE1/YR: CPT | Mod: CPTII,S$GLB,, | Performed by: ORTHOPAEDIC SURGERY

## 2019-01-14 PROCEDURE — 3075F PR MOST RECENT SYSTOLIC BLOOD PRESS GE 130-139MM HG: ICD-10-PCS | Mod: CPTII,S$GLB,, | Performed by: ORTHOPAEDIC SURGERY

## 2019-01-14 PROCEDURE — 99999 PR PBB SHADOW E&M-EST. PATIENT-LVL III: ICD-10-PCS | Mod: PBBFAC,,, | Performed by: ORTHOPAEDIC SURGERY

## 2019-01-14 PROCEDURE — 99999 PR PBB SHADOW E&M-EST. PATIENT-LVL III: CPT | Mod: PBBFAC,,, | Performed by: ORTHOPAEDIC SURGERY

## 2019-01-14 PROCEDURE — 73560 X-RAY EXAM OF KNEE 1 OR 2: CPT | Mod: TC,PN,LT

## 2019-01-14 PROCEDURE — 3075F SYST BP GE 130 - 139MM HG: CPT | Mod: CPTII,S$GLB,, | Performed by: ORTHOPAEDIC SURGERY

## 2019-01-14 PROCEDURE — 99213 PR OFFICE/OUTPT VISIT, EST, LEVL III, 20-29 MIN: ICD-10-PCS | Mod: S$GLB,,, | Performed by: ORTHOPAEDIC SURGERY

## 2019-01-15 ENCOUNTER — OFFICE VISIT (OUTPATIENT)
Dept: PODIATRY | Facility: CLINIC | Age: 74
End: 2019-01-15
Payer: MEDICARE

## 2019-01-15 ENCOUNTER — LAB VISIT (OUTPATIENT)
Dept: LAB | Facility: HOSPITAL | Age: 74
End: 2019-01-15
Attending: UROLOGY
Payer: MEDICARE

## 2019-01-15 VITALS
HEIGHT: 69 IN | DIASTOLIC BLOOD PRESSURE: 82 MMHG | BODY MASS INDEX: 42.65 KG/M2 | WEIGHT: 288 LBS | HEART RATE: 58 BPM | SYSTOLIC BLOOD PRESSURE: 148 MMHG

## 2019-01-15 DIAGNOSIS — M21.621 TAILOR'S BUNION OF RIGHT FOOT: ICD-10-CM

## 2019-01-15 DIAGNOSIS — Z87.898 HISTORY OF ULCERATION: ICD-10-CM

## 2019-01-15 DIAGNOSIS — E11.42 DIABETIC POLYNEUROPATHY ASSOCIATED WITH TYPE 2 DIABETES MELLITUS: Primary | ICD-10-CM

## 2019-01-15 DIAGNOSIS — L97.511 RIGHT FOOT ULCER, LIMITED TO BREAKDOWN OF SKIN: ICD-10-CM

## 2019-01-15 DIAGNOSIS — C61 PROSTATE CANCER: ICD-10-CM

## 2019-01-15 DIAGNOSIS — N32.81 OVERACTIVE BLADDER: ICD-10-CM

## 2019-01-15 DIAGNOSIS — I73.9 PERIPHERAL VASCULAR DISEASE: ICD-10-CM

## 2019-01-15 LAB — COMPLEXED PSA SERPL-MCNC: <0.01 NG/ML

## 2019-01-15 PROCEDURE — 1101F PR PT FALLS ASSESS DOC 0-1 FALLS W/OUT INJ PAST YR: ICD-10-PCS | Mod: CPTII,S$GLB,, | Performed by: PODIATRIST

## 2019-01-15 PROCEDURE — 99213 PR OFFICE/OUTPT VISIT, EST, LEVL III, 20-29 MIN: ICD-10-PCS | Mod: 25,S$GLB,, | Performed by: PODIATRIST

## 2019-01-15 PROCEDURE — 84153 ASSAY OF PSA TOTAL: CPT

## 2019-01-15 PROCEDURE — 3079F PR MOST RECENT DIASTOLIC BLOOD PRESSURE 80-89 MM HG: ICD-10-PCS | Mod: CPTII,S$GLB,, | Performed by: PODIATRIST

## 2019-01-15 PROCEDURE — 99999 PR PBB SHADOW E&M-EST. PATIENT-LVL III: ICD-10-PCS | Mod: PBBFAC,,, | Performed by: PODIATRIST

## 2019-01-15 PROCEDURE — 3044F HG A1C LEVEL LT 7.0%: CPT | Mod: CPTII,S$GLB,, | Performed by: PODIATRIST

## 2019-01-15 PROCEDURE — 3077F SYST BP >= 140 MM HG: CPT | Mod: CPTII,S$GLB,, | Performed by: PODIATRIST

## 2019-01-15 PROCEDURE — 99999 PR PBB SHADOW E&M-EST. PATIENT-LVL III: CPT | Mod: PBBFAC,,, | Performed by: PODIATRIST

## 2019-01-15 PROCEDURE — 1101F PT FALLS ASSESS-DOCD LE1/YR: CPT | Mod: CPTII,S$GLB,, | Performed by: PODIATRIST

## 2019-01-15 PROCEDURE — 99213 OFFICE O/P EST LOW 20 MIN: CPT | Mod: 25,S$GLB,, | Performed by: PODIATRIST

## 2019-01-15 PROCEDURE — 3077F PR MOST RECENT SYSTOLIC BLOOD PRESSURE >= 140 MM HG: ICD-10-PCS | Mod: CPTII,S$GLB,, | Performed by: PODIATRIST

## 2019-01-15 PROCEDURE — 3079F DIAST BP 80-89 MM HG: CPT | Mod: CPTII,S$GLB,, | Performed by: PODIATRIST

## 2019-01-15 PROCEDURE — 97597 DBRDMT OPN WND 1ST 20 CM/<: CPT | Mod: S$GLB,,, | Performed by: PODIATRIST

## 2019-01-15 PROCEDURE — 97597 PR DEBRIDEMENT OPEN WOUND 20 SQ CM<: ICD-10-PCS | Mod: S$GLB,,, | Performed by: PODIATRIST

## 2019-01-15 PROCEDURE — 3044F PR MOST RECENT HEMOGLOBIN A1C LEVEL <7.0%: ICD-10-PCS | Mod: CPTII,S$GLB,, | Performed by: PODIATRIST

## 2019-01-15 RX ORDER — MECOBAL/LEVOMEFOLAT CA/B6 PHOS 2-3-35 MG
TABLET ORAL
Qty: 180 TABLET | Refills: 3 | Status: SHIPPED | OUTPATIENT
Start: 2019-01-15 | End: 2020-01-13 | Stop reason: SDUPTHER

## 2019-01-15 NOTE — PROGRESS NOTES
Subjective:      Patient ID: Richard Bustos is a 73 y.o. male.    Chief Complaint: Foot Ulcer (right)    Foot ulcer right foot, aggravated by increased weight bearing, shoe gear, pressure.  Covering  Right foot wound with apertures with good improvement.  Denies signs infection, pain.  DM shoes/inserts today old.  Got casted for new ones last week.    CC2 new ulcer right foot by small toe.  Unknown onset, not improving past few days.  aggravated by increased weight bearing, shoe gear, pressure.  No previous medical treatment.  No self treatment.  Believes had dressing too tight.  Review of Systems   Constitution: Negative for chills, diaphoresis, fever, malaise/fatigue and night sweats.   Cardiovascular: Positive for leg swelling. Negative for claudication, cyanosis and syncope.   Skin: Positive for nail changes and poor wound healing. Negative for color change, dry skin, rash, suspicious lesions and unusual hair distribution.   Musculoskeletal: Negative for falls, joint pain, joint swelling, muscle cramps, muscle weakness and stiffness.   Gastrointestinal: Negative for constipation, diarrhea, nausea and vomiting.   Neurological: Positive for paresthesias and sensory change. Negative for brief paralysis, disturbances in coordination, focal weakness, numbness and tremors.           Objective:      Physical Exam   Constitutional: He is oriented to person, place, and time. He appears well-developed and well-nourished. He is cooperative. No distress.   Cardiovascular:   Pulses:       Popliteal pulses are 2+ on the right side, and 2+ on the left side.        Dorsalis pedis pulses are 1+ on the right side, and 1+ on the left side.        Posterior tibial pulses are 1+ on the right side, and 1+ on the left side.   Capillary refill 3 seconds all toes/distal feet, all toes/both feet warm to touch.      Negative lymphadenopathy bilateral popliteal fossa and tarsal tunnel.     <2+ pitting lower extremity edema bilateral.      Musculoskeletal:        Right ankle: He exhibits normal range of motion, no swelling, no ecchymosis, no deformity, no laceration and normal pulse. Achilles tendon normal. Achilles tendon exhibits no pain, no defect and normal Chung's test results.   Patient has hammertoes of digits  2-5 bilateral                 partially reducible without symptom today.    Tailor bunion right foot with deviated right 5th toe without signs trauma or loss of function right foot.    Othrwise, Normal angle, base, station of gait. All ten toes without clubbing, cyanosis, or signs of ischemia.  No pain to palpation bilateral lower extremities.  Range of motion, stability, muscle strength, and muscle tone normal bilateral feet and legs.     Lymphadenopathy: No inguinal adenopathy noted on the right or left side.   Negative lymphadenopathy bilateral popliteal fossa and tarsal tunnel.    Negative lymphangitic streaking bilateral feet/ankles/legs.   Neurological: He is alert and oriented to person, place, and time. He has normal strength. He displays no atrophy and no tremor. A sensory deficit is present. He exhibits normal muscle tone. Gait normal.   Reflex Scores:       Patellar reflexes are 2+ on the right side and 2+ on the left side.       Achilles reflexes are 2+ on the right side and 2+ on the left side.  Diminished/loss of protective sensation all toes bilateral to 10 gram monofilament.    Paresthesias, and burning bilateral feet with no clearly identified trigger or source.     Skin: Skin is warm, dry and intact. Capillary refill takes 2 to 3 seconds. No abrasion, no bruising, no burn, no ecchymosis, no laceration, no lesion and no rash noted. He is not diaphoretic. No cyanosis or erythema. No pallor. Nails show no clubbing.         Wound plantar right 1st mtpj remains closed today, epithelialized  without ulceration, drainage, pus, tracking, fluctuance, malodor, or cardinal signs infection.    Wound:  Lateral right 5th  mtpj    Size:    Pre:[id number] - bulla  Post: [id number]    Base:  Granular, pink with moderate serous/serosanaguinous drainage only.  No pus, tracking, fluctuance, malodor, or cardinal signs infection.    Borders: flat, pink, blanchable skin edges without undermining.      Hyperpigmentation right and left pretibial surfaces.    Otherwise, Skin thin, shiny, atrophic, with decreased density and distribution of pedal hair bilateral, but without hyperpigmentation, minerva discoloration,  ulcers, masses, nodules or cords palpated bilateral feet and legs.       Psychiatric: He has a normal mood and affect.             Assessment:       Encounter Diagnoses   Name Primary?    Diabetic polyneuropathy associated with type 2 diabetes mellitus Yes    Peripheral vascular disease     History of ulceration     Right foot ulcer, limited to breakdown of skin     Tailor's bunion of right foot          Plan:       Richard was seen today for foot ulcer.    Diagnoses and all orders for this visit:    Diabetic polyneuropathy associated with type 2 diabetes mellitus  -     X-Ray Foot Complete Right; Future    Peripheral vascular disease  -     X-Ray Foot Complete Right; Future    History of ulceration  -     X-Ray Foot Complete Right; Future    Right foot ulcer, limited to breakdown of skin  -     X-Ray Foot Complete Right; Future    Tailor's bunion of right foot  -     X-Ray Foot Complete Right; Future      I counseled the patient on his conditions, their implications and medical management.    Dressed right foot wound with wound foam, kerlix - change same every other day.    apertireright 1st mtpj to offload - change same every few days.    Continue DM shoe left, dispense sx shoe right - ambulate minimally same.    xrays right.    Debride wound right, to, including, not through dermis.    Adequate vitamin supplementation, protein intake, and hydration - discussed with patient.            Follow-up in about 1 week (around 1/22/2019).

## 2019-01-15 NOTE — PROCEDURES
"Wound Debridement  Date/Time: 1/15/2019 9:54 AM  Performed by: Iván Torrez DPM  Authorized by: Iván Torrez DPM     Time out: Immediately prior to procedure a "time out" was called to verify the correct patient, procedure, equipment, support staff and site/side marked as required.    Consent Done?:  Yes (Verbal)  Local anesthesia used?: No      Wound Details:    Location:  Right foot    Location:  Right 5th Metatarsal Head    Type of Debridement:  Non-excisional       Length (cm):  2       Area (sq cm):  6       Width (cm):  3       Percent Debrided (%):  100       Depth (cm):  0.1       Total Area Debrided (sq cm):  6    Depth of debridement:  Epidermis/Dermis    Tissue debrided:  Dermis and Epidermis    Devitalized tissue debrided:  Exudate, Slough and Other    Instruments:  Blade    Bleeding:  Minimal  Hemostasis Achieved: Yes    Method Used:  Pressure and Silver Nitrate  Patient tolerance:  Patient tolerated the procedure well with no immediate complications      "

## 2019-01-17 ENCOUNTER — OFFICE VISIT (OUTPATIENT)
Dept: UROLOGY | Facility: CLINIC | Age: 74
End: 2019-01-17
Payer: MEDICARE

## 2019-01-17 VITALS
BODY MASS INDEX: 43.15 KG/M2 | HEART RATE: 59 BPM | SYSTOLIC BLOOD PRESSURE: 143 MMHG | HEIGHT: 69 IN | DIASTOLIC BLOOD PRESSURE: 73 MMHG | WEIGHT: 291.31 LBS

## 2019-01-17 DIAGNOSIS — N32.81 OAB (OVERACTIVE BLADDER): Primary | ICD-10-CM

## 2019-01-17 DIAGNOSIS — N28.89 RENAL CYST, NATIVE, HEMORRHAGE: ICD-10-CM

## 2019-01-17 DIAGNOSIS — C61 PROSTATE CANCER: ICD-10-CM

## 2019-01-17 DIAGNOSIS — N28.1 RENAL CYST, NATIVE, HEMORRHAGE: ICD-10-CM

## 2019-01-17 LAB
BILIRUB SERPL-MCNC: NORMAL MG/DL
BLOOD URINE, POC: NORMAL
COLOR, POC UA: YELLOW
GLUCOSE UR QL STRIP: NORMAL
KETONES UR QL STRIP: NORMAL
LEUKOCYTE ESTERASE URINE, POC: NORMAL
NITRITE, POC UA: NORMAL
PH, POC UA: 5.5
PROTEIN, POC: NORMAL
SPECIFIC GRAVITY, POC UA: 1.02
UROBILINOGEN, POC UA: NORMAL

## 2019-01-17 PROCEDURE — 99999 PR PBB SHADOW E&M-EST. PATIENT-LVL III: ICD-10-PCS | Mod: PBBFAC,,, | Performed by: UROLOGY

## 2019-01-17 PROCEDURE — 3077F SYST BP >= 140 MM HG: CPT | Mod: CPTII,S$GLB,, | Performed by: UROLOGY

## 2019-01-17 PROCEDURE — 3077F PR MOST RECENT SYSTOLIC BLOOD PRESSURE >= 140 MM HG: ICD-10-PCS | Mod: CPTII,S$GLB,, | Performed by: UROLOGY

## 2019-01-17 PROCEDURE — 3078F PR MOST RECENT DIASTOLIC BLOOD PRESSURE < 80 MM HG: ICD-10-PCS | Mod: CPTII,S$GLB,, | Performed by: UROLOGY

## 2019-01-17 PROCEDURE — 3078F DIAST BP <80 MM HG: CPT | Mod: CPTII,S$GLB,, | Performed by: UROLOGY

## 2019-01-17 PROCEDURE — 81002 URINALYSIS NONAUTO W/O SCOPE: CPT | Mod: S$GLB,,, | Performed by: UROLOGY

## 2019-01-17 PROCEDURE — 99213 OFFICE O/P EST LOW 20 MIN: CPT | Mod: 25,S$GLB,, | Performed by: UROLOGY

## 2019-01-17 PROCEDURE — 99999 PR PBB SHADOW E&M-EST. PATIENT-LVL III: CPT | Mod: PBBFAC,,, | Performed by: UROLOGY

## 2019-01-17 PROCEDURE — 1101F PT FALLS ASSESS-DOCD LE1/YR: CPT | Mod: CPTII,S$GLB,, | Performed by: UROLOGY

## 2019-01-17 PROCEDURE — 81002 POCT URINE DIPSTICK WITHOUT MICROSCOPE: ICD-10-PCS | Mod: S$GLB,,, | Performed by: UROLOGY

## 2019-01-17 PROCEDURE — 1101F PR PT FALLS ASSESS DOC 0-1 FALLS W/OUT INJ PAST YR: ICD-10-PCS | Mod: CPTII,S$GLB,, | Performed by: UROLOGY

## 2019-01-17 PROCEDURE — 99213 PR OFFICE/OUTPT VISIT, EST, LEVL III, 20-29 MIN: ICD-10-PCS | Mod: 25,S$GLB,, | Performed by: UROLOGY

## 2019-01-17 NOTE — PATIENT INSTRUCTIONS
Overactive bladder and urge incontinence  -continue myrbetriq 50mg once daily, refill given for 1 year   -nighttime leakage not bothersome enough to wake up. Doing well during day on myrbetriq    Prostate cancer, Gl 4+4 treated with radiation and ADT  -ADT x 2 years (last one was July 2018)  -discontinue fosamax  -psa in 3 months and 6 months and f/u after 6 month psa    hemhorragic renal cyst  - rbus 1/2017 done and these were confirmed to be multiple right renal hemhorragic cysts  -repeat to confirm not solid and if the same will likely never check again.      rbus before 3 months  psa in 3 months  psa in 6 months  F/u in 6 months

## 2019-01-17 NOTE — PROGRESS NOTES
[facility] Progress Note - [place]  Urology Group: [facility]rijesh/Becca/Gal[facility]  PCP: Dr.James newcomb [facility]    Chief Complaint: No chief complaint on file.      Subjective:        HPI: Richard Bustos is a 73 y.o. male presents with          Prostate cancer, Gl 4+4 s/p XRT  completed in 2016+ 2 years ADT  Pt was referred to  for Gl 4+4, T2c, N0M0 prostate cancer in June 2016. Decided on radiation with concurrent ADT x 2 years  Underwent gold seed on 8/1/16. Pt completed 7560 Gy, 42 fractions of IRT (9/1/16-10/31/16).   first Trelstar given 6/2016, last given 7/26/18.    Last saw  4 months ago   Most recent psa <0.01 on 1/15/19  On calcium and fosamax     He's having back pain but has chronic back pain and had injections in January.   +fatigue, otherwise no complaints    psa hx   1/15/19 <0.01  10/16/18 <0.01  7/26/18 trelstar 22.5mg IM in right gluteus  7/5/18  <0.01, T 11  1/12/18 Bone density scan normal.   1/4/18  <0.01, T 14 - administered trelstar, started Fosamax  6/12/17 <0.01, T 13 - administered trelstar, started ca/vitD  12/12/16 <0.01 - adminstered trelstar  10/31/16 Completed radiation   8/1/16  3.3 - underwent goldseed  6/2016  trelstar  5/23/16 trus bx: Gl 4+4, volume 49.6g. Bone scan and CT scan negative  3/18/16 7.7  2/3/15   6.1  8/22/14  5.3  9/26/11  3.9  8/26/10  3.0      oab  -He states he tolerated the radiation but when I saw him in December 2017 he was having Frequency q2-3 hours, UUI that occured 3-4x a daily requiring 1 pad a day. Not on any med. Denies straining. Good stream.   -I started him on myrbetriq 50mg once a day in 2017 which improved his oab sx - no longer needed pads, UUI decreased to 2x a month (uses a cane). But he stopped and was Voiding q1-2 hours and +UUI 2x day, I restarted myrbetriq 50mg on 1/18/18, improved to 3x a day.     He says he sometimes wakes up with a wet pad but does sleep through the night and  "would rather sleep through the night than wake up to urinate. occ leakage during day but rare. Voiding 4x a day.     AUA ssx:(2 incomplete emptying, 4 frequency, 5 intermittency, 3 urgency, 1 weak stream, 1 straining, did not fill out  sleeping). 16. QOL: mostly satisfied      Right renal hemhorragic cysts  rbus 1/18/17 confirmed these were bloody cyst  Denies any GH    ua today: negative   Urine history:  7/2018  No cx, void: 2+protein   4/12/18 Ng, void: 2+prot/tr blood- no dysuria "smell strong"      REVIEW OF SYSTEMS:  General ROS: no fevers, no chills  Psychological ROS: no depression  Endocrine ROS: no heat or cold  Respiratory ROS: no SOB  Cardiovascular ROS: no CP, + easy bruising  Gastrointestinal ROS: no abdominal pain, no constipation, no diarrhea, no BRBPR  Musculoskeletal ROS: no muscle pain  Neurological ROS: no headaches  Dermatological ROS: no rashes  HEENT: +glasses, no sinus   ROS: per HPI    The past medical, surgical, social and family hx were reviewed. There have not been any changes.    Cataracts? removed    Urologic meds: myrbetriq 50mg daily   Anticoagulation: Yes - coumadin for hypercoagulable state and plavix    Objective:     Vitals:    01/17/19 1053   BP: (!) 143/73   Pulse: (!) 59          Physical Exam   Constitutional: He is oriented to person, place, and time. He appears well-developed and well-nourished. He is cooperative. No distress.   HENT:   Head: Normocephalic and atraumatic.   Eyes: Pupils are equal, round, and reactive to light.   Cardiovascular: Normal rate and regular rhythm.    Pulmonary/Chest: Effort normal.   Abdominal: Soft. Normal appearance.   Musculoskeletal: Normal range of motion.   Neurological: He is alert and oriented to person, place, and time. He has normal reflexes.   Skin: Skin is intact.     Psychiatric: He has a normal mood and affect.           Labs:  9/19/17 Cr 1.6, seen by     Path:     LEFT    RIGHT  BASE   3+3 (1/2),  4+3 (1/2)     MID    4+4 " (1/2)    b9  APEX    b9      b9   Date of Biopsy: 5/23/16  PSA: 7.7  Volume: 49.6  Prostate nodule: no    Rads:   DEXA bone density done for ADT 1/12/18: Normal bone mineral density of the of the lumbar spine.    1/18/17 rbus  Multiple bilateral renal cysts  4 right renal hemhorragic cysts - 1.8 cm, 6.8 cm, 4.5 cm and 1.6     6/14/16   ctap   Bilateral renal masses  No significant adenopathy  8cm complex fluid collection    Bone scan 6/14/16  Negative    Assessment:       1. OAB (overactive bladder)    2. Prostate cancer    3. Renal cyst, native, hemorrhage        Plan:     Overactive bladder and urge incontinence  -continue myrbetriq 50mg once daily, refill given for 1 year   -nighttime leakage not bothersome enough to wake up. Doing well during day on myrbetriq    Prostate cancer, Gl 4+4 treated with radiation and ADT  -ADT x 2 years (last one was July 2018)  -discontinue fosamax  -psa in 3 months and 6 months and f/u after 6 month psa    hemhorragic renal cyst  - rbus 1/2017 done and these were confirmed to be multiple right renal hemhorragic cysts  -repeat to confirm not solid and if the same will likely never check again.      rbus before 3 months  psa in 3 months  psa in 6 months  F/u in 6 months

## 2019-01-18 NOTE — PROGRESS NOTES
Past Medical History:   Diagnosis Date    Anemia     Anemia of other chronic disease 9/13/2017    Anemia, chronic renal failure 9/13/2017    Anemia, unspecified 9/13/2017    Anticoagulant long-term use     Aorta aneurysm     Arthritis     Atrial fibrillation     CAD (coronary artery disease)     CHF (congestive heart failure)     Chronic kidney disease     Clotting disorder 9/13/2017    Colon polyp     Diabetes mellitus     not on meds    Diabetes mellitus type II     Diverticulosis     Elevated PSA     Encounter for blood transfusion     Former smoker     GERD (gastroesophageal reflux disease)     Gout     Hx of colonic polyps     Hypercoagulable state     Hyperlipidemia     Hypertension     MI, old 2010    Myocardial infarction     9/2010    Osteomyelitis of ankle or foot 3/9/2017    Prostate cancer 06/2016    Sleep apnea     Squamous cell carcinoma 01/23/2018    Left helix, imiquimod    Venous stasis     Chronic       Past Surgical History:   Procedure Laterality Date    ABDOMINAL SURGERY      BLOCK-NERVE-MEDIAL BRANCH-LUMBAR Bilateral 7/13/2017    [facility]    BLOCK-NERVE-MEDIAL BRANCH-LUMBAR Bilateral 6/22/2017    [facility] OR    CARDIAC SURGERY      COLONOSCOPY  02/2011    diverticulosis with diverticula with clot, no active bleeding    COLONOSCOPY N/A 8/24/2016    [facility]    CYSTOSCOPY N/A 5/23/2016    [facility]eline Moss [facility]    GASTRIC BYPASS  2/5/2008    IVC FILTER RETRIEVAL      JOINT REPLACEMENT  1996 and 2001    bi-lat hip replacement/Rt Hip and Lt Hip    RADIOFREQUENCY THERMOCOAGULATION (RFTC)-NERVE-MEDIAN BRANCH-LUMBAR Bilateral 8/3/2017    [facility] OR    ROTATOR CUFF REPAIR      Rt shoulder    Stents  8/18/2010    x 3    TRANSRECTAL ULTRASOUND GUIDED PROSTATE BIOPSY N/A 8/1/2016    [facility] Adeline KAPLAN  [facility]    TRANSRECTAL ULTRASOUND GUIDED PROSTATE BIOPSY N/A 5/23/2016    [facility] OR    UPPER GASTROINTESTINAL ENDOSCOPY  02/2011       Current Outpatient Medications   Medication Sig    allopurinol (ZYLOPRIM) 100 MG tablet TAKE 1 TABLET(100 MG) BY MOUTH EVERY DAY    ALPRAZolam (XANAX) 1 MG tablet TAKE 1 TABLET(1 MG) BY MOUTH EVERY EVENING    aspirin (ECOTRIN) 81 MG EC tablet Take 81 mg by mouth once daily.    atorvastatin (LIPITOR) 80 MG tablet TAKE 1 TABLET(80 MG) BY MOUTH EVERY DAY    calcitRIOL (ROCALTROL) 0.5 MCG Cap TK 1 C PO D    carvedilol (COREG) 25 MG tablet TAKE 1 TABLET BY MOUTH TWICE DAILY WITH MEALS    clopidogrel (PLAVIX) 75 mg tablet     ergocalciferol (ERGOCALCIFEROL) 50,000 unit Cap TK 1 C PO Q WEEK    escitalopram oxalate (LEXAPRO) 10 MG tablet TAKE 1 TABLET(10 MG) BY MOUTH EVERY DAY    ferrous sulfate (FEOSOL) 325 mg (65 mg iron) Tab tablet TAKE 1 TABLET BY MOUTH TWICE DAILY    fluticasone (FLONASE) 50 mcg/actuation nasal spray 2 sprays (100 mcg total) by Each Nare route once daily.    FOLIC ACID/MULTIVIT-MIN/LUTEIN (CENTRUM SILVER ORAL) Take 1 tablet by mouth once daily.    furosemide (LASIX) 40 MG tablet TAKE 1 TABLET BY MOUTH DAILY AS NEEDED    HYDROcodone-acetaminophen (NORCO) 7.5-325 mg per tablet Take 1 tablet by mouth every 6 (six) hours as needed for Pain.    imiquimod (ALDARA) 5 % cream APPLY TO THE AFFECTED AREA 5 TIMES A WEEK FOR 2 TO 4 WEEKS AS TOLORATED    lisinopril (PRINIVIL,ZESTRIL) 40 MG tablet TAKE 1 TABLET BY MOUTH EVERY EVENING    MAGOX 400 mg tablet TAKE 1 TABLET BY MOUTH EVERY DAY    memantine (NAMENDA) 5 MG Tab TAKE 1 TABLET(5 MG) BY MOUTH TWICE DAILY    nitroGLYCERIN 0.4 MG/DOSE TL SPRY (NITROLINGUAL) 400 mcg/spray spray PLACE 1 SPRAY UNDER THE TONGUE EVERY 5 MINUTES AS NEEDED FOR CHEST PAIN    omeprazole (PRILOSEC) 20 MG capsule TAKE 1 CAPSULE BY MOUTH DAILY    predniSONE (DELTASONE) 20 MG  tablet Take 1 tablet (20 mg total) by mouth 2 (two) times daily.    ranitidine (ZANTAC) 150 MG tablet TAKE 1 TABLET(150 MG) BY MOUTH TWICE DAILY    triamcinolone acetonide 0.1% (KENALOG) 0.1 % cream AAA bid    vit C-vit E-lutein-min-om-3 (OCUVITE) 303-38-1-150 mg-unit-mg-mg Cap Take 1 capsule by mouth once daily.    warfarin (COUMADIN) 5 MG tablet TAKE 1 TABLET BY MOUTH EVERY DAY (Patient taking differently: TAKE 1 TABLET BY MOUTH EVERY DAY EXCEPT 1 1/2 TABLETS ON WEDNESDAY)    calcium carbonate (TUMS ULTRA) 400 mg calcium (1,000 mg) Chew Take 1 tablet (400 mg total) by mouth once daily.    hydrocortisone 2.5 % cream Apply topically 2 (two) times daily. for 10 days    L-METHYL-B6-B12 3-35-2 mg Tab TAKE 1 TABLET BY MOUTH TWICE DAILY    mecobal-levomefolat Ca-B6 phos (L-METHYL-B6-B12) 3-35-2 mg Tab Take 1 tablet by mouth 2 (two) times daily.    mirabegron (MYRBETRIQ) 50 mg Tb24 Take 1 tablet (50 mg total) by mouth once daily.     No current facility-administered medications for this visit.        Review of patient's allergies indicates:   Allergen Reactions    Shellfish containing products Other (See Comments)     Other reaction(s): Gout       Family History   Problem Relation Age of Onset    Heart attack Mother     Heart attack Father     Heart attack Brother     Ulcerative colitis Daughter 35    Lupus Daughter     Colon cancer Neg Hx     Colon polyps Neg Hx     Crohn's disease Neg Hx     Melanoma Neg Hx     Psoriasis Neg Hx     Eczema Neg Hx        Social History     Socioeconomic History    Marital status:      Spouse name: Not on file    Number of children: Not on file    Years of education: Not on file    Highest education level: Not on file   Social Needs    Financial resource strain: Not on file    Food insecurity - worry: Not on file    Food insecurity - inability: Not on file    Transportation needs - medical: Not on file    Transportation needs - non-medical: Not on file  "  Occupational History    Not on file   Tobacco Use    Smoking status: Former Smoker    Smokeless tobacco: Never Used    Tobacco comment: 45 yrs ago, only smoked 3-4 packs in his entire life as a teenager   Substance and Sexual Activity    Alcohol use: Yes     Comment: rare    Drug use: No    Sexual activity: Not on file   Other Topics Concern    Not on file   Social History Narrative    Not on file       Chief Complaint:   Chief Complaint   Patient presents with    Knee Injury     followup left knee s/p patella fx DOI 11/12/18       Consulting Physician: No ref. provider found    History of present illness:    This is a 73 y.o. male who complains of left knee pain following a fall 11-12-18 while getting in the car.  He puts his pain at 3/10.  He is able to bear some weight. Not wearing brace.    Review of Systems:    Constitution: Denies chills, fever, and sweats.  HENT: Denies headaches or blurry vision.  Cardiovascular: Denies chest pain or irregular heart beat.  Respiratory: Denies cough or shortness of breath.  Gastrointestinal: Denies abdominal pain, nausea, or vomiting.  Musculoskeletal:  Denies muscle cramps.  Neurological: Denies dizziness or focal weakness.  Psychiatric/Behavioral: Normal mental status.  Hematologic/Lymphatic: Denies bleeding problem or easy bruising/bleeding.  Skin: Denies rash or suspicious lesions.    Examination:    Vital Signs:    Vitals:    01/14/19 1040   BP: 139/67   Pulse: (!) 56   Weight: 130.6 kg (288 lb)   Height: 5' 9" (1.753 m)   PainSc:   3       Body mass index is 42.53 kg/m².    This a well-developed, well nourished patient in no acute distress.    Alert and oriented x 3 and cooperative to examination.       Physical Exam: Left Knee Exam    Gait   antalgic    Skin  Rash:   None  Scars:   None    Inspection  Erythema:  None  Bruising:  None  Effusion:  none  Masses:  None  Lymphadenopathy: None    Range of Motion: 0-90    Medial Joint : No  Lateral " Joint : No    Patellofemoral Tenderness: no    Strength:  4-4+    Pulses:  Palpable distal    Sensation:  Intact        Imaging: X-rays ordered and images interpreted today personally by me of left knee shows a vertical nondisplaced fracture of the left patella.       Assessment: Closed nondisplaced longitudinal fracture of left patella with routine healing, subsequent encounter        Plan:  He is not wearing his brace.  We will start physical therapy and see him back in 6 weeks.  Weightbearing as tolerated.    DISCLAIMER: This note may have been dictated using voice recognition software and may contain grammatical errors.     NOTE: Consult report sent to referring provider via [facility] EMR.

## 2019-01-22 ENCOUNTER — OFFICE VISIT (OUTPATIENT)
Dept: PODIATRY | Facility: CLINIC | Age: 74
End: 2019-01-22
Payer: MEDICARE

## 2019-01-22 ENCOUNTER — HOSPITAL ENCOUNTER (OUTPATIENT)
Dept: RADIOLOGY | Facility: CLINIC | Age: 74
Discharge: HOME | End: 2019-01-22
Attending: UROLOGY
Payer: MEDICARE

## 2019-01-22 VITALS — HEIGHT: 69 IN | WEIGHT: 291.44 LBS | BODY MASS INDEX: 43.16 KG/M2

## 2019-01-22 DIAGNOSIS — L97.512 RIGHT FOOT ULCER, WITH FAT LAYER EXPOSED: ICD-10-CM

## 2019-01-22 DIAGNOSIS — E11.42 DIABETIC POLYNEUROPATHY ASSOCIATED WITH TYPE 2 DIABETES MELLITUS: Primary | ICD-10-CM

## 2019-01-22 DIAGNOSIS — I73.9 PERIPHERAL VASCULAR DISEASE: ICD-10-CM

## 2019-01-22 DIAGNOSIS — N28.1 COMPLEX RENAL CYST: Primary | ICD-10-CM

## 2019-01-22 DIAGNOSIS — N32.81 OAB (OVERACTIVE BLADDER): ICD-10-CM

## 2019-01-22 DIAGNOSIS — C61 PROSTATE CANCER: ICD-10-CM

## 2019-01-22 DIAGNOSIS — M21.621 TAILOR'S BUNION OF RIGHT FOOT: ICD-10-CM

## 2019-01-22 PROCEDURE — 76770 US EXAM ABDO BACK WALL COMP: CPT | Mod: 26,,, | Performed by: RADIOLOGY

## 2019-01-22 PROCEDURE — 99499 NO LOS: ICD-10-PCS | Mod: S$GLB,,, | Performed by: PODIATRIST

## 2019-01-22 PROCEDURE — 99999 PR PBB SHADOW E&M-EST. PATIENT-LVL III: ICD-10-PCS | Mod: PBBFAC,,, | Performed by: PODIATRIST

## 2019-01-22 PROCEDURE — 76770 US EXAM ABDO BACK WALL COMP: CPT | Mod: TC,PO

## 2019-01-22 PROCEDURE — 76770 US RETROPERITONEAL COMPLETE: ICD-10-PCS | Mod: 26,,, | Performed by: RADIOLOGY

## 2019-01-22 PROCEDURE — 11042 PR DEBRIDEMENT, SKIN, SUB-Q TISSUE,=<20 SQ CM: ICD-10-PCS | Mod: S$GLB,,, | Performed by: PODIATRIST

## 2019-01-22 PROCEDURE — 99499 UNLISTED E&M SERVICE: CPT | Mod: S$GLB,,, | Performed by: PODIATRIST

## 2019-01-22 PROCEDURE — 99999 PR PBB SHADOW E&M-EST. PATIENT-LVL III: CPT | Mod: PBBFAC,,, | Performed by: PODIATRIST

## 2019-01-22 PROCEDURE — 11042 DBRDMT SUBQ TIS 1ST 20SQCM/<: CPT | Mod: S$GLB,,, | Performed by: PODIATRIST

## 2019-01-22 NOTE — PROCEDURES
"Wound Debridement  Date/Time: 1/22/2019 10:49 AM  Performed by: Iván Torrez DPM  Authorized by: Iván Torrez DPM     Time out: Immediately prior to procedure a "time out" was called to verify the correct patient, procedure, equipment, support staff and site/side marked as required.    Consent Done?:  Yes (Verbal)  Local anesthesia used?: No      Wound Details:    Location:  Right foot    Location:  Right 5th Metatarsal Head    Type of Debridement:  Excisional       Length (cm):  0.9       Area (sq cm):  0.72       Width (cm):  0.8       Percent Debrided (%):  100       Depth (cm):  0.2       Total Area Debrided (sq cm):  0.72    Depth of debridement:  Subcutaneous tissue    Tissue debrided:  Dermis, Epidermis and Subcutaneous    Devitalized tissue debrided:  Biofilm and Callus    Instruments:  Curette    Bleeding:  Minimal  Hemostasis Achieved: Yes    Method Used:  Pressure  Patient tolerance:  Patient tolerated the procedure well with no immediate complications      "

## 2019-01-25 ENCOUNTER — OFFICE VISIT (OUTPATIENT)
Dept: CARDIOLOGY | Facility: CLINIC | Age: 74
End: 2019-01-25
Payer: MEDICARE

## 2019-01-25 VITALS
HEIGHT: 69 IN | HEART RATE: 57 BPM | SYSTOLIC BLOOD PRESSURE: 128 MMHG | WEIGHT: 286.81 LBS | BODY MASS INDEX: 42.48 KG/M2 | OXYGEN SATURATION: 95 % | DIASTOLIC BLOOD PRESSURE: 66 MMHG

## 2019-01-25 DIAGNOSIS — G47.33 OBSTRUCTIVE SLEEP APNEA SYNDROME: Chronic | ICD-10-CM

## 2019-01-25 DIAGNOSIS — E11.22 TYPE 2 DIABETES MELLITUS WITH STAGE 3 CHRONIC KIDNEY DISEASE, WITHOUT LONG-TERM CURRENT USE OF INSULIN: Chronic | ICD-10-CM

## 2019-01-25 DIAGNOSIS — I73.9 PERIPHERAL VASCULAR DISEASE: ICD-10-CM

## 2019-01-25 DIAGNOSIS — E11.59 HYPERTENSION ASSOCIATED WITH DIABETES: ICD-10-CM

## 2019-01-25 DIAGNOSIS — D68.59 HYPERCOAGULABLE STATE: ICD-10-CM

## 2019-01-25 DIAGNOSIS — Z79.01 LONG TERM (CURRENT) USE OF ANTICOAGULANTS: ICD-10-CM

## 2019-01-25 DIAGNOSIS — I15.2 HYPERTENSION ASSOCIATED WITH DIABETES: ICD-10-CM

## 2019-01-25 DIAGNOSIS — N18.30 TYPE 2 DIABETES MELLITUS WITH STAGE 3 CHRONIC KIDNEY DISEASE, WITHOUT LONG-TERM CURRENT USE OF INSULIN: Chronic | ICD-10-CM

## 2019-01-25 DIAGNOSIS — I71.20 THORACIC AORTIC ANEURYSM WITHOUT RUPTURE: Chronic | ICD-10-CM

## 2019-01-25 DIAGNOSIS — I25.10 CORONARY ARTERY DISEASE INVOLVING NATIVE CORONARY ARTERY OF NATIVE HEART WITHOUT ANGINA PECTORIS: Chronic | ICD-10-CM

## 2019-01-25 DIAGNOSIS — E78.00 PURE HYPERCHOLESTEROLEMIA: ICD-10-CM

## 2019-01-25 DIAGNOSIS — I65.23 BILATERAL CAROTID ARTERY STENOSIS: Chronic | ICD-10-CM

## 2019-01-25 DIAGNOSIS — I48.0 PAROXYSMAL ATRIAL FIBRILLATION: ICD-10-CM

## 2019-01-25 DIAGNOSIS — E66.01 MORBID OBESITY: ICD-10-CM

## 2019-01-25 DIAGNOSIS — I11.9 HYPERTENSIVE LEFT VENTRICULAR HYPERTROPHY, WITHOUT HEART FAILURE: ICD-10-CM

## 2019-01-25 DIAGNOSIS — Z91.89 SEDENTARY LIFESTYLE: Primary | ICD-10-CM

## 2019-01-25 DIAGNOSIS — N18.30 CKD (CHRONIC KIDNEY DISEASE) STAGE 3, GFR 30-59 ML/MIN: ICD-10-CM

## 2019-01-25 DIAGNOSIS — R53.82 CHRONIC FATIGUE: ICD-10-CM

## 2019-01-25 DIAGNOSIS — R94.39 ABNORMAL CARDIOVASCULAR STRESS TEST: ICD-10-CM

## 2019-01-25 PROCEDURE — 3078F DIAST BP <80 MM HG: CPT | Mod: CPTII,S$GLB,, | Performed by: INTERNAL MEDICINE

## 2019-01-25 PROCEDURE — 3044F HG A1C LEVEL LT 7.0%: CPT | Mod: CPTII,S$GLB,, | Performed by: INTERNAL MEDICINE

## 2019-01-25 PROCEDURE — 3078F PR MOST RECENT DIASTOLIC BLOOD PRESSURE < 80 MM HG: ICD-10-PCS | Mod: CPTII,S$GLB,, | Performed by: INTERNAL MEDICINE

## 2019-01-25 PROCEDURE — 99215 OFFICE O/P EST HI 40 MIN: CPT | Mod: S$GLB,,, | Performed by: INTERNAL MEDICINE

## 2019-01-25 PROCEDURE — 3074F SYST BP LT 130 MM HG: CPT | Mod: CPTII,S$GLB,, | Performed by: INTERNAL MEDICINE

## 2019-01-25 PROCEDURE — 99999 PR PBB SHADOW E&M-EST. PATIENT-LVL III: ICD-10-PCS | Mod: PBBFAC,,, | Performed by: INTERNAL MEDICINE

## 2019-01-25 PROCEDURE — 3074F PR MOST RECENT SYSTOLIC BLOOD PRESSURE < 130 MM HG: ICD-10-PCS | Mod: CPTII,S$GLB,, | Performed by: INTERNAL MEDICINE

## 2019-01-25 PROCEDURE — 99499 UNLISTED E&M SERVICE: CPT | Mod: S$GLB,,, | Performed by: INTERNAL MEDICINE

## 2019-01-25 PROCEDURE — 3044F PR MOST RECENT HEMOGLOBIN A1C LEVEL <7.0%: ICD-10-PCS | Mod: CPTII,S$GLB,, | Performed by: INTERNAL MEDICINE

## 2019-01-25 PROCEDURE — 99999 PR PBB SHADOW E&M-EST. PATIENT-LVL III: CPT | Mod: PBBFAC,,, | Performed by: INTERNAL MEDICINE

## 2019-01-25 PROCEDURE — 99499 RISK ADDL DX/OHS AUDIT: ICD-10-PCS | Mod: S$GLB,,, | Performed by: INTERNAL MEDICINE

## 2019-01-25 PROCEDURE — 99215 PR OFFICE/OUTPT VISIT, EST, LEVL V, 40-54 MIN: ICD-10-PCS | Mod: S$GLB,,, | Performed by: INTERNAL MEDICINE

## 2019-01-25 PROCEDURE — 1101F PT FALLS ASSESS-DOCD LE1/YR: CPT | Mod: CPTII,S$GLB,, | Performed by: INTERNAL MEDICINE

## 2019-01-25 PROCEDURE — 1101F PR PT FALLS ASSESS DOC 0-1 FALLS W/OUT INJ PAST YR: ICD-10-PCS | Mod: CPTII,S$GLB,, | Performed by: INTERNAL MEDICINE

## 2019-01-25 NOTE — PROGRESS NOTES
Patient, Richard Bustos (MRN #[id number]), presented with a recent Ejection Fraction less than 45% consistent with the definition of cardiomyopathy (ICD10- I42.8).    No results found for: EF  The patient's cardiomyopathy was monitored, evaluated, addressed and/or treated. This addendum to the medical record is made on 01/25/2019.

## 2019-01-25 NOTE — PROGRESS NOTES
Patient ID: Richard Bustos is a 69 y.o. male who presents for follow-up of Follow-up  For CAD, chronic fatigue, 6 months follow up  PCP: Dr. Ramirez, see every 4 months, does labs through [facility]  Hematologist: Dr. Quezada  Urologist: Dr. Moss  Podiatrist: Amy Lyon  Wound care: Dr. Torrez  Pain: Dr. 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 Suffered a NSTEMI in 8/2010, catheterization showed SUMMARY:   1. Three vessel coronary artery disease.   2. Successful PCI. Involved 2 JAYCEE in the LAD and OM1.    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 Off diet at times, had underwent gastri bypass in 2008.    Last Echo in 2010 showed CONCLUSIONS   1 - Mild left ventricular enlargement.   2 - Normal left ventricular function (EF 58%).   3 - Diastolic dysfunction.   4 - Biatrial enlargement.   5 - Mildly to moderately enlarged ascending aorta.    Last carotid US: 9/13/2010  Bilateral 20%-40% narrowing.  No symptoms.    Coronary Artery Disease  Presents for follow-up visit. Pertinent negatives include no chest pain, dizziness, leg swelling, muscle weakness, palpitations, shortness of breath or weight gain. Risk factors include hyperlipidemia. The symptoms have been resolved. Compliance with diet is variable. Compliance with exercise is good. Compliance with medications is good.   Hyperlipidemia  Pertinent negatives include no chest pain, focal weakness, myalgias or shortness of breath.     EKG shows  "sinus bradycardia, rate 45, today NSR, 75, VPC, and nonspecific STTW abnormalities.     Since visit of 7/2012, have lots of stress with tenants, BP noted to be elevated, log reviewed 153-174/, HR 52-74. Had cardiac testing -   ECHO CONCLUSIONS 7/2012   1 - Mild left ventricular enlargement.   2 - Mildly to moderately depressed left ventricular function (EF 40%).   3 - Eccentric hypertrophy.   4 - Mildly to moderately enlarged ascending aorta.   5 - Mild left atrial enlargement.   6 - Normal diastolic function.   7 - Mild aortic regurgitation.   8 - Suggestion for inferoposterior hypokinesia.   9 - Compare to 8/17/2010, there is increase in LVH with decrease in   LVEF from 58%, and new inferoposterior hypokinesia. The Aortic dimensions   have not changed.    Carotid US, 7/2012  IMPRESSION   Findings consistent with less than 50% diameter stenosis of proximal   internal carotid arteries by NASCAET criteria. Flow in the vertebral   arteries appears antegrade bilaterally.    Since visit of 1/23, still with occasional angina, average once a week, lasting about a minute, "light" grade 3-4/10.  Workup:  ECHO, 2/5/20163, CONCLUSIONS   1 - Mild left ventricular enlargement.   2 - Mildly to moderately depressed left ventricular function (EF 40%).   3 - Normal diastolic function.   4 - Inferoposterior hypokinesia consistent with ischemic disease..   5 - Moderately enlarged ascending aorta. 4.4 cm - stable   6 - No significant change from study of 7/17/2012.    Lexiscan, 2/5/2013  Nuclear Quantitative Functional Analysis:   LVEF: 34 % (normal is 47 - 59)   LVED Volume: 131 ml (normal is 91 - 155)   LVES Volume: 87 ml (normal is 40 - 78)   Impression: ABNORMAL MYOCARDIAL PERFUSION   1. There is evidence for mild to moderate myocardial ischemia in the   lateral apical wall of the left ventricle with associated stress induced   LV cavity dilatation with underlying injury present.   2. There is moderate intensity fixed defect " in the inferolateral wall of   the left ventricle, consistent with myocardial injury. There is trivial   franicne-injury ischemia.   3. There is abnormal wall motion at rest showing akinesis of the lateral   wall of the left ventricle, moderate global hypokinesis of the left   ventricle. dyskinesis of the inferolateral wall of the left ventricle.   4. There is resting LV dysfunction with a reduced ejection fraction of 34   %. (normal is 47 - 59)   5. The left ventricular volume is mildly increased at rest.   6. The extracardiac distribution of radioactivity is normal.  7. [facility] SSS =15, SRS =10, SDS =5, suggest 6.25% of myocardium may be   ischemic.    Since visit of 2/8, noticed some bilateral leg weakness, post bilateral THR, also occasional charley horse at night, mostly right sided. Today had mild chest pain, grade 2/10, while driving here. Claims to have not heard from [facility], INR on 2/18 was 1.8. Question about Plavix answered. Discussed with daughter, Citlali, over the telephone.     Since visit 2/22, no new problem. Discussed aortic aneurysm, will need at least annual imaging, do not recall the last time. Have claustrophobia requesting Xanax. No problem with the medications, stays very active. Weight reviewed was below 290 lbs in 12/2011.   Apparently likes Arcadio rincon!     Since visit of 3/16, had MRA done with use of Xanax and hydrocodone,   Result - Max ascending aorta 4.3 cm stable.  Tolerating medications without problem, using BiPAP nightly, still lot of stress with Rental Properties. Discuss need for exercise program with weigh reduction of 10%. New problem include left leg weakness with localized pain behind the knee. For about 2 weeks, been dancing with new woman.    Since visit of 4/11/2013, hospitalized 2 weeks ago at [facility] for large hematoma due to use of Lovenox as bridge for oral surgery. Coumadin on hold, seems to be getting smaller and softer. Denies any heart  symptoms or CP nor SOB. No problem with taking medications and using CPAP. Slowed down due to leg pain and fatigue. Feels good in the AM.    Since visit of 4/9/2014, complain of leg weakness, noted since 2/2013. Denies any CP nor SOB. Miss 2-3 doses of medications monthly. No other problem with medications. Awaken all night due to dog, sleep 10-4. Feels tired in the morning despite using CPAP. Have not had much blood work done. ECG shows AF rate 67, RBBB.  ECHO in 5/2014 CONCLUSIONS   1 - Enlarged aortic root. measuring 3.9 cm at sinotubular junction.  2 - Biatrial enlargement.   3 - Eccentric hypertrophy.   4 - Mildly to moderately depressed left ventricular systolic function (EF 40-45%).   5 - Normal left ventricular diastolic function.   6 - Right ventricular enlargement with normal systolic function.   7 - No significant change from echo on 2/5/2013..   8 - Difficult apical window, Optison contrast used for wall motion analysis..     Since visit of 11/21, no new problem. Some concern of HR down to 40 after exercise. Noted easy fatigue but remains quite sedentary. Discussed need for Coreg titration for LV dysfunction. Labs reviewed, CKD eith eGFR of 47, mild anemia Hgb 12.1, , A1C 4.7, and proteinuria.   Cardiac workup:  Carotid US, 12/2014 - CONCLUSIONS   There is 0 - 19% right Internal Carotid stenosis.  There is 0 - 19% left Internal Carotid stenosis.    Clean vessels    Lexiscan Nuclear Quantitative Functional Analysis:   LVEF: 38 % (normal is 47 - 59)  LVED Volume: 172 ml (normal is 91 - 155)  LVES Volume: 107 ml (normal is 40 - 78)    Impression: ABNORMAL MYOCARDIAL PERFUSION  1. There is evidence for mild to moderate myocardial ischemia in the inferior and lateral walls of the left ventricle with associated stress induced LV cavity dilatation with underlying injury present.   2. There is moderate to severe intensity fixed defect in the inferolateral wall of the left ventricle, consistent with  myocardial injury.   3. There is abnormal wall motion at rest showing moderate global hypokinesis of the left ventricle. severe hypokinesis of the inferior and septal walls of the left ventricle.   4. There is resting LV dysfunction with a reduced ejection fraction of 38 %. (normal is 47 - 59)  5. The left ventricular volume is moderately increased at rest.   6. The extracardiac distribution of radioactivity is normal.   7. When compared to the previous study from 02/05/2013, no significant changes noted.  8. [facility] SSS=15, SRS=8, and SDS=7, suggest that 8.7% of myocardium may be ischemic.    Holter, 12/2014:  PREDOMINANT RHYTHM  1. Sinus arrhythmia with heart rates varying between 41 and 110 bpm with an average of 64 bpm.     VENTRICULAR ARRHYTHMIAS  1. There were frequent mostly monomorphic PVCs totalling 1297 and averaging 54 per hour. There was 1 couplet.    2. 1.6% of complexes.    3. There were no episodes of ventricular tachycardia.    SUPRA VENTRICULAR ARRHYTHMIAS  1. There were rare PACs totalling 108 and averaging 4 per hour.     2. There were no episodes of sustained supraventricular tachycardia.    SINUS NODE FUNCTION  1. There was no evidence of high grade SA sang block.     AV CONDUCTION  1. There was no evidence of high grade AV block.     DIARY  1. The diary was not returned    MISCELLANEOUS  1. There were persistent hookup related artifacts. Overall, the study was of good quality.     2. This was a tape of adequate length (24 hrs).     Since visit of 12/23/2014, underwent LHC with PCI of RCA. Off ASA due to rectal bleed. Discuss use of triple Rx for hopefully 3 months post JAYCEE implant. Feeling more energetic, no CP nor SOB. Discussed ascending Aortic aneurysm, last checked in 5/2014 with Echo - stable.   HEMODYNAMIC RESULTS:    LVEDP (Pre): 16 mmHg  LVEDP (Post): 18 mmHg  Ejection Fraction: 40%  Global LV Function: moderately depressed  BP: 109/70  HR: 96    Air rest:  LVEDP: 18    C.  ANGIOGRAPHIC RESULTS:    DIAGNOSTIC:  Patient has a right dominant coronary artery.     - Left Main Coronary Artery:  The LM is normal. There is DAREK 3 flow.    - Left Anterior Descending Artery:  The mid LAD has a 50% stenosis. There is DAREK 3 flow. The remaining portion of the vessel has multiple lesions < 50% stenosed. Long stent noted in mid-LAD, 50% ISR.    - Left Circumflex Artery:  The LCX has luminal irregularities. There is DAREK 3 flow.    - Right Coronary Artery:  The mid RCA has a 75% stenosis. There is DAREK 3 flow. The remaining portion of the vessel has luminal irregularities. Appears to be close to distal end of previously placed stent.    - Aortic Root:  The Aortic Root is normal. There is DAREK 3 flow.      D. SUMMARY:    1. Two vessel coronary artery disease.  2. Diastolic dysfunction.  3. - Very difficult case due to tortuosity of the innominate artery, multiple manipulation needed for cannulation of CA.  4. - RCA lesion appears as a large plaque with significant luminal stenois.    E. RECOMMENDATIONS:    1. Continue medical management.  2. Cardiac rehab referral.  3. Weight loss.  4. Follow up with Dr. Familia Tate.  5. Schedule for PCI.  6. - Hydration overnight, eGFR 47.  7. - Dr. Gallegos consulted for PCI of RCA  8. - On chronic warfarin Rx, on hold for now  9. - Start  mg today and daily for total of 5 days as warfarin is resumed post PCI  10. - Start Plavix, load with 300 mg today then 75 mg daily in addition to warfarin.    PCI INTERVENTION:    Proximal RCA:  The lesion was successfully intervened. Post-stenosis of 0% and post-DAREK 3 flow. The vessel was accessed natively. The following items were used: Stent Resolute Rx 4.00x30 (JAYCEE).    C. SUMMARY:    1. Successful PCI/JAYCEE of the proximal RCA.    D. RECOMMENDATIONS:    1. Routine post PCI care.  2. Risk factor reductions.  3. ASA 81mg.  4. Clopidogrel (Plavix) for one year.  5. Weight loss.    Since visit of 1/22/2015, no CP nor SOB.  "Been compliant with medications, no problem. Noted venous ulcer in the left leg about a week ago. Dressing with peroxide. Lab from [facility] reviewed, K+ remains 5.5 with stable Cr of 1.78, eGFR 39. Active with rental properties upkeep.     Since visit of 3/27, no new problem, difficulties in getting lab results from [facility]. Reviewed eGFR 39, K+ 5.5, will need to stay with low K+ diet. Problem with venous stasis ulceration in the left leg. Worked with Wound Care for 2 weeks, told to return if problem.     Since visit of 4/23,no new problem, feeling "pretty good". In process of selling rental properties. Active with walking about 4 miles a day, takes about an hour. Legs better with ACE stocking. Review lab, BMP on 5/15/2015 K+ 4.7, stable renal function with eGFR 41, CBC in 1/2015 Hgb 11.8, last Ferritin level in 4/2011.  ECHO, 5/2015, CONCLUSIONS   1 - Moderately enlarged aortic root. measuring 4.1 cm at sinotubular junction.  2 - Concentric hypertrophy. Wall thickness is increased, with the septum and the posterior wall each measuring 1.4 cm across.  3 - Low normal to mildly depressed left ventricular systolic function (EF 50-55%).   4 - Normal left ventricular diastolic function.   5 - Right ventricular enlargement with normal systolic function.   6 - Difficult windows, Optison contrast used for wall motion analysis.   7 - Suggestion for some improvement in LVEF from Echo on 5/7/2014.     Since visit of 5/29/2015, more active, compliant with medications, PT twice weekly for an hour, no problem. Last BMP in 5/2015, K+ 4.7, Cr 1.7, eGFR 41. Under care of Dr. Torrez for venous stasis and ulcer. Uses support hose occasionally due to the hot weather.    Since visit of 9/10/2015, no new problem, less stress, no regular exercise. Had completed phase II Cardiac Rehab. Asked to consider phase III. Home BP is good, 135. Citlali fixes medications, don't skip.    Since visit of 2/8/2016, no new problem. Undergoing urologic evaluation " for kidney mass with biopsies of the bladder, prostate, and the right kidney, no problem with the procedure, awaiting results. ECG shows RBBB, no problem with medications, no regular exercise but considering to start. Decline stress test, no CP nor SOB.     In 12/2016, first concern is chronic fatigue, received 42 radiation Rx, have nocturia 3X nightly which interrupt the sleep, gets only about 5 hours. Will review with upcoming visit with Urologist. Stay active, able to walk 2 blocks with can, also uses free weights 2X weekly, for 15-20 minutes. Have DORA, uses CPAP 100%. For past 2 months had 2 episode of chest pain under emotional stress, last up to 15 minutes, max grade 3/10, have not use any NTG. Chart reviewed - last nuclear stress test in 12/2014. Had JAYCEE placed in 1/2015. Last lipid is excellent, LDL 57, non-HDL 75. Weight stable at around 294 lbs.   Echo, 6/2016 CONCLUSIONS     1 - Mild left ventricular enlargement.     2 - Eccentric hypertrophy.     3 - Moderately depressed left ventricular systolic function (EF 35-40%).     4 - Normal left ventricular diastolic function.     5 - Right ventricular enlargement with normal systolic function.     6 - The estimated PA systolic pressure is greater than 25 mmHg.     7 - Mild tricuspid regurgitation.     8 - Trivial to mild aortic regurgitation.     9 - Suggestion for deterioration in LVEF from Echo in 5/2015.     10 - Difficult windows, Optison contrast used for wall motion analysis.     Carotid US  Consider repeat study in six months.    CONCLUSIONS   There is 40 - 49% right Internal Carotid stenosis.  There is 20 - 39% left Internal Carotid stenosis.    Antegrade flows in the vertebral arteries. Homogenous plaques noted in both ICAs.    In 2/2017, active with rental, feels tired all the time, using CPAP 100%, wake up feeling all right then tired after 2-3 hours. Was using hydrocodone bid for chronic pains, out of medications for 3 weeks, feels more tired off  the narcotic. Explained need for regular exercise with muscle strengthening.  Lexiscan - Nuclear Quantitative Functional Analysis:   LVEF: 42 % (normal is 47 - 59)  LVED Volume: 124 ml (normal is 91 - 155)  LVES Volume: 72 ml (normal is 40 - 78)    Impression: ABNORMAL MYOCARDIAL PERFUSION  1. There is moderate intensity fixed defects in the lateral and inferolateral walls of the left ventricle, consistent with myocardial injury. There is trivial francine-injury ischemia.   2. There is abnormal wall motion at rest showing moderate hypokinesis of the inferolateral and inferior walls of the left ventricle.   3. There is resting LV dysfunction with a reduced ejection fraction of 42 %.  (normal is 47 - 59)  4. The ventricular volumes are normal at rest and stress.   5. The extracardiac distribution of radioactivity is normal.   6. When compared to the previous study from 12/15/2014, the inferolateral defects now appears fixed.  7. [facility] SSS=15, SRS=14, and SDS=1, suggest that only 1% of myocardium may be ischemic.    Carotid US - CONCLUSIONS   There is 20 - 39% right Internal Carotid stenosis.  There is 0 - 19% left Internal Carotid stenosis.    Homogenous plaques noted in the ICAs, antegrade flows in the vertebral  arteries.    In 7/2017, here supposedly for HTN but not certain, also planning to do RF nerve ablation next Thursday with Dr. Causey, no surgical clearance requested. BP in PCP office was 112/60. Compliant with medications. Also have started using BiPAP every night for DORA, feel better. Denies any CP nor SOB. Active with property management and HA. ECG shows NSR, RBBB.     In 1/2018, no cardiac symptom, no heart worries. Very few home BP. Remain active using the cane, limited by loss of balance due to neuropathy of the LE. Fallen twice, due to the shoe, uses cane as needed. One episode, hit nose with bleeding. Labs reviewed from 9/2017: LDL 51.4, A1C 6.7%, anemia Hgb 9.8, CKD stage 3, eGFR 43   Echo in 8/2017  - CONCLUSIONS     1 - Eccentric hypertrophy.     2 - Moderately depressed left ventricular systolic function (EF 35-40%).     3 - Regional wall motion analysis consistent with ischemic disease.     4 - Impaired LV relaxation, normal LAP (grade 1 diastolic dysfunction).     5 - Trivial to mild mitral regurgitation.     6 - Right ventricular enlargement with normal systolic function.     7 - The estimated PA systolic pressure is 26 mmHg.     8 - No definite change from Echo in 6/2016.     In 7/2018, occasional electricity in the heart, very brief. ECG in NSR, 62 bpm, PVC and RBBB. No CP, stay active dealing with a lot friends' problem, he is the problem solver, very stressful.  Carotid US 2/2017  CONCLUSIONS   There is 0 - 19% right Internal Carotid stenosis.  There is 0 - 19% left Internal Carotid stenosis.    Homogenous plaques in the ICAs with antegrade flows in the vertebral arteries.    Holter - Conclusion   · NSR with occasional mostly monomorphic PVC, 3000, about 2% of complexes, rare PAC  · No symptom reported        HPI Comments: in 1/2019, return for 6 months review. No heart worries, no cardiac symptoms. Trying to be active, walking a problem with leg weakness and dizziness. Considered high fall risk. Fairly sedentary. LDL 8/2018, 50. Lots of agitation from the renters.   Echo 8/2018 - The left ventricle cavity is normal.  · Left atrium is moderately dilated.  · Tricuspid valve shows mild regurgitation.  · Left ventricle ejection fraction is low normal at 51%  · Normal LV diastolic function.  · RV systolic function is normal.  · Improved LVEF from Echo in 8/2017     Difficult windows, Lumason contrast used for wall motion analysis.    Review of Systems   Constitution: Negative for diaphoresis, fever, some weakness, and malaise/fatigue, no night sweats and but weight loss of 13 lbs since 1/2018 from cutting down eating.  HENT: Negative for headaches, nosebleeds and tinnitus.   Eyes: Negative for visual  disturbance. Have watery eyes, photophobia  Cardiovascular: Positive for dyspnea on exertion and chest pain with emotional stress. Negative claudication, cyanosis, irregular heartbeat, leg swelling, near-syncope, orthopnea, palpitations and paroxysmal nocturnal dyspnia.   Respiratory: Negative for cough, shortness of breath, sleep disturbances due to breathing, snoring and wheezing. [place] score 13 down to 11 on CPAP 100% use  Endocrine: Negative for polydipsia and polyuria.   Hematologic/Lymphatic: Does not bruise/bleed easily.   Skin: Negative for nail changes, color change, flushing, poor wound healing and suspicious lesions. ecchymosis on ASA especially with the cat, and warfarin  Musculoskeletal: Positive for arthritis, back pain and muscle cramps on daily Xanax 1 mg. Negative for falls, gout, joint pain, joint swelling,   Bilateral hip replacements (right 1996, left 2001), right knee arthroscopic procedure 1996.  Have muscle weakness and myalgias in hip and legs, also muscle cramps in the hands, and legs at night.  Gastrointestinal: Negative for heartburn, hematemesis, hematochezia, melena and nausea. Have no bloating, have constipation and excessive gas.  Neurological: Negative for disturbances in coordination, have excessive daytime sleepiness, dizziness, focal weakness in both LE, occasional light-headedness, have numbness, occasional loss of balance, no vertigo.   Psychiatric/Behavioral: Negative for depression and substance abuse. The patient is nervous/anxious and stressed with rentals and memory loss. The patient does not have insomnia.        Objective:     Physical Exam   Constitutional: He is oriented to person, place, and time. He appears well-developed and well-nourished.   HENT:   Head: Normocephalic.   Eyes: Conjunctivae and EOM are normal. Pupils are equal, round, and reactive to light.   Neck: Normal range of motion. Neck supple. No JVD present. No thyromegaly present.   Cardiovascular:  "regular rate, regular rhythm, soft heart sounds and intact distal pulses. Exam reveals no gallop and no friction rub.   No murmur heard.  No swelling.   Pulmonary/Chest: Effort normal. He has no wheezes. He has no rales. He exhibits no tenderness.   Abdominal: Soft. Bowel sounds are normal. There is no tenderness.  Protuberance, waist circumference 57.5 inches increased to 58" , hip 55 inches.   Musculoskeletal: Normal range of motion. He exhibits No tenderness (mild behind the left knee.). He exhibits 1+ edema in left lower extremities to the knee.   Lymphadenopathy:   He has no cervical adenopathy.   Neurological: He is alert and oriented to person, place, and time.   Skin: Skin is warm and dry. No rash noted.   Venous stasis, bilateral with stasis dermatitis with multiple scratches.        Assessment:    Richard was seen today for 6 month follow-up.    Diagnoses and all orders for this visit:    Sedentary lifestyle    Hypertension associated with diabetes    Pure hypercholesterolemia    Hypercoagulable state, Prothrombin mutation    Morbid obesity, today down BMI 42.3    CKD (chronic kidney disease) stage 3, GFR 30-59 ml/min, 41    Abnormal cardiovascular stress test    Long term (current) use of anticoagulants    Paroxysmal atrial fibrillation    Hypertensive left ventricular hypertrophy, without heart failure    Peripheral vascular disease    Chronic fatigue    Coronary artery disease involving native coronary artery of native heart without angina pectoris    Type 2 diabetes mellitus with stage 3 chronic kidney disease, without long-term current use of insulin    Obstructive sleep apnea syndrome    Bilateral carotid artery stenosis    Thoracic aortic aneurysm without rupture        Plan:      - All medical issues reviewed, continue current Rx.  - CV status stable, continue current Rx, all medications reviewed, patient acknowledge good understanding.  - Check home blood pressure, 2 days weekly, do 2 readings within " 5 minutes in AM and PM, keep log for review.  - Weigh twice weekly, try to lose 1-2 lbs per week  - Highly recommend 30 minutes of exercise daily, can have Sunday off, with 2-3 sessions of muscle strengthening weekly. A  would be very helpful.  - Recommend at least biannual cardiovascular evaluation in view of his significant risk factors. Patient's preference  - Phone review / [facility]      Patient Active Problem List   Diagnosis    Diabetes mellitus with chronic kidney disease, stage III    MTHFR mutation (methylenetetrahydrofolate reductase)    Sleep apnea, using BiPAP nightly, 1999, last sleep test in 2013    Hypertension associated with diabetes    CAD (coronary artery disease), NSTEMI, JAYCEE x2, LAD & OM1, 8/18/2010, JAYCEE to RCA , 1/15/2015    Hyperlipidemia, baseline     Gout    GERD (gastroesophageal reflux disease)    Hypercoagulable state, Prothrombin mutation    Colon polyps    Anxiety    Chronic back pain    Morbid obesity, today down BMI 42.3    Carotid disease, bilateral    Aortic aneurysm, thoracic, 4.3 cm, 8/2010    GARY (generalized anxiety disorder)    CKD (chronic kidney disease) stage 3, GFR 30-59 ml/min, 41    Abnormal cardiovascular stress test    LV dysfunction, EF 40%    Long term (current) use of anticoagulants    OA (osteoarthritis). post bilateral THR, 2001    Diabetic neuropathy    H/O bariatric surgery, 2008    Osteoarthritis, knee    BPH with obstruction/lower urinary tract symptoms    Proteinuria    Paroxysmal atrial fibrillation    Sedentary lifestyle    Stasis dermatitis of both legs    Type 2 diabetes mellitus with diabetic nephropathy    Hypertensive left ventricular hypertrophy, without heart failure    Prostate cancer    Sleep deprivation    Chronic fatigue    Ulcer of toe of left foot    Peripheral vascular disease    Facet arthropathy    Anemia, chronic disease    Chronic, continuous use of opioids    Chronically on  benzodiazepine therapy    MCI (mild cognitive impairment)    Closed nondisplaced fracture of base of fourth metacarpal bone of left hand    Diabetic ulcer of toe of right foot associated with type 2 diabetes mellitus, with fat layer exposed     Total face-to-face time with the patient was 25 minutes and greater than 50% was spent in counseling and coordination of care. The above assessment and plan have been discussed at length. Labs and procedure over the last 6 months reviewed. Problem List updated. Asked to bring in all active medications / pills bottles with next visit.

## 2019-02-01 ENCOUNTER — OFFICE VISIT (OUTPATIENT)
Dept: PODIATRY | Facility: CLINIC | Age: 74
End: 2019-02-01
Payer: MEDICARE

## 2019-02-01 VITALS — BODY MASS INDEX: 42.65 KG/M2 | WEIGHT: 287.94 LBS | HEIGHT: 69 IN | RESPIRATION RATE: 20 BRPM

## 2019-02-01 DIAGNOSIS — L97.512 RIGHT FOOT ULCER, WITH FAT LAYER EXPOSED: ICD-10-CM

## 2019-02-01 DIAGNOSIS — E11.42 DIABETIC POLYNEUROPATHY ASSOCIATED WITH TYPE 2 DIABETES MELLITUS: Primary | ICD-10-CM

## 2019-02-01 DIAGNOSIS — I73.9 PERIPHERAL VASCULAR DISEASE: ICD-10-CM

## 2019-02-01 PROCEDURE — 99499 UNLISTED E&M SERVICE: CPT | Mod: S$GLB,,, | Performed by: PODIATRIST

## 2019-02-01 PROCEDURE — 11042 DBRDMT SUBQ TIS 1ST 20SQCM/<: CPT | Mod: S$GLB,,, | Performed by: PODIATRIST

## 2019-02-01 PROCEDURE — 99499 NO LOS: ICD-10-PCS | Mod: S$GLB,,, | Performed by: PODIATRIST

## 2019-02-01 PROCEDURE — 99999 PR PBB SHADOW E&M-EST. PATIENT-LVL III: ICD-10-PCS | Mod: PBBFAC,,, | Performed by: PODIATRIST

## 2019-02-01 PROCEDURE — 11042 PR DEBRIDEMENT, SKIN, SUB-Q TISSUE,=<20 SQ CM: ICD-10-PCS | Mod: S$GLB,,, | Performed by: PODIATRIST

## 2019-02-01 PROCEDURE — 99999 PR PBB SHADOW E&M-EST. PATIENT-LVL III: CPT | Mod: PBBFAC,,, | Performed by: PODIATRIST

## 2019-02-01 NOTE — PROGRESS NOTES
Subjective:      Patient ID: Richard Bustos is a 73 y.o. male.    Chief Complaint: No chief complaint on file.    Foot ulcer right foot, aggravated by increased weight bearing, shoe gear, pressure.  Covering wound right 5th mtp foot wound with wound foam and 1st mtpj with apertures with good improvement.  Denies signs infection, pain.  DM shoes/inserts today old.  Got  new ones last week- wearing today.        Review of Systems   Constitution: Negative for chills, diaphoresis, fever, malaise/fatigue and night sweats.   Cardiovascular: Positive for leg swelling. Negative for claudication, cyanosis and syncope.   Skin: Positive for nail changes and poor wound healing. Negative for color change, dry skin, rash, suspicious lesions and unusual hair distribution.   Musculoskeletal: Negative for falls, joint pain, joint swelling, muscle cramps, muscle weakness and stiffness.   Gastrointestinal: Negative for constipation, diarrhea, nausea and vomiting.   Neurological: Positive for paresthesias and sensory change. Negative for brief paralysis, disturbances in coordination, focal weakness, numbness and tremors.           Objective:      Physical Exam   Constitutional: He is oriented to person, place, and time. He appears well-developed and well-nourished. He is cooperative. No distress.   Cardiovascular:   Pulses:       Popliteal pulses are 2+ on the right side, and 2+ on the left side.        Dorsalis pedis pulses are 1+ on the right side, and 1+ on the left side.        Posterior tibial pulses are 1+ on the right side, and 1+ on the left side.   Capillary refill 3 seconds all toes/distal feet, all toes/both feet warm to touch.      Negative lymphadenopathy bilateral popliteal fossa and tarsal tunnel.     <2+ pitting lower extremity edema bilateral.     Musculoskeletal:        Right ankle: He exhibits normal range of motion, no swelling, no ecchymosis, no deformity, no laceration and normal pulse. Achilles tendon normal.  Achilles tendon exhibits no pain, no defect and normal Chung's test results.   Patient has hammertoes of digits  2-5 bilateral                 partially reducible without symptom today.    Tailor bunion right foot with deviated right 5th toe without signs trauma or loss of function right foot.    Othrwise, Normal angle, base, station of gait. All ten toes without clubbing, cyanosis, or signs of ischemia.  No pain to palpation bilateral lower extremities.  Range of motion, stability, muscle strength, and muscle tone normal bilateral feet and legs.     Lymphadenopathy: No inguinal adenopathy noted on the right or left side.   Negative lymphadenopathy bilateral popliteal fossa and tarsal tunnel.    Negative lymphangitic streaking bilateral feet/ankles/legs.   Neurological: He is alert and oriented to person, place, and time. He has normal strength. He displays no atrophy and no tremor. A sensory deficit is present. He exhibits normal muscle tone. Gait normal.   Reflex Scores:       Patellar reflexes are 2+ on the right side and 2+ on the left side.       Achilles reflexes are 2+ on the right side and 2+ on the left side.  Diminished/loss of protective sensation all toes bilateral to 10 gram monofilament.    Paresthesias, and burning bilateral feet with no clearly identified trigger or source.     Skin: Skin is warm, dry and intact. Capillary refill takes 2 to 3 seconds. No abrasion, no bruising, no burn, no ecchymosis, no laceration, no lesion and no rash noted. He is not diaphoretic. No cyanosis or erythema. No pallor. Nails show no clubbing.         Wound:  Plantar right 1st mtpj    Size:    Pre:[id number]  Post: [id number]    Base:  Granular, pink with moderate serous/serosanaguinous drainage only.  No pus, tracking, fluctuance, malodor, or cardinal signs infection.    Borders:  Hyperkeratotic, debriding to flat, pink, blanchable skin edges without undermining.    Wound:  Lateral right 5th mtpj closed today  Without  open ulceration, drainage, pus, tracking, fluctuance, malodor, or cardinal signs infection.         Hyperpigmentation right and left pretibial surfaces.    Otherwise, Skin thin, shiny, atrophic, with decreased density and distribution of pedal hair bilateral, but without hyperpigmentation, minerva discoloration,  ulcers, masses, nodules or cords palpated bilateral feet and legs.       Psychiatric: He has a normal mood and affect.             Assessment:       Encounter Diagnoses   Name Primary?    Diabetic polyneuropathy associated with type 2 diabetes mellitus Yes    Peripheral vascular disease     Right foot ulcer, with fat layer exposed          Plan:       Diagnoses and all orders for this visit:    Diabetic polyneuropathy associated with type 2 diabetes mellitus    Peripheral vascular disease    Right foot ulcer, with fat layer exposed      I counseled the patient on his conditions, their implications and medical management.    Dressed right foot wound with wound foam, kerlix - change same every other day.    Revert to sx shoe left, sx shoe right - ambulate minimally same.    Debride wound right 1st mtpj.    Adequate vitamin supplementation, protein intake, and hydration - discussed with patient.    Inspect feet multiple times daily for signs of occurrence/recurrence ulceration/infection.          Follow-up in about 1 week (around 2/8/2019).

## 2019-02-08 ENCOUNTER — DOCUMENTATION ONLY (OUTPATIENT)
Dept: FAMILY MEDICINE | Facility: CLINIC | Age: 74
End: 2019-02-08

## 2019-02-08 NOTE — PROGRESS NOTES
Pre-Visit Chart Review  For Appointment Scheduled on 02/11/2019    There are no preventive care reminders to display for this patient.

## 2019-02-11 ENCOUNTER — OFFICE VISIT (OUTPATIENT)
Dept: FAMILY MEDICINE | Facility: CLINIC | Age: 74
End: 2019-02-11
Payer: MEDICARE

## 2019-02-11 ENCOUNTER — LAB VISIT (OUTPATIENT)
Dept: LAB | Facility: HOSPITAL | Age: 74
End: 2019-02-11
Attending: PHYSICIAN ASSISTANT
Payer: MEDICARE

## 2019-02-11 ENCOUNTER — OFFICE VISIT (OUTPATIENT)
Dept: PODIATRY | Facility: CLINIC | Age: 74
End: 2019-02-11
Payer: MEDICARE

## 2019-02-11 VITALS
HEIGHT: 69 IN | BODY MASS INDEX: 42.67 KG/M2 | WEIGHT: 288.13 LBS | HEART RATE: 62 BPM | TEMPERATURE: 98 F | SYSTOLIC BLOOD PRESSURE: 122 MMHG | DIASTOLIC BLOOD PRESSURE: 56 MMHG

## 2019-02-11 VITALS
HEIGHT: 69 IN | DIASTOLIC BLOOD PRESSURE: 79 MMHG | HEART RATE: 59 BPM | SYSTOLIC BLOOD PRESSURE: 139 MMHG | BODY MASS INDEX: 42.51 KG/M2 | WEIGHT: 287 LBS

## 2019-02-11 DIAGNOSIS — L97.512 RIGHT FOOT ULCER, WITH FAT LAYER EXPOSED: Primary | ICD-10-CM

## 2019-02-11 DIAGNOSIS — E11.22 TYPE 2 DIABETES MELLITUS WITH STAGE 3 CHRONIC KIDNEY DISEASE, WITH LONG-TERM CURRENT USE OF INSULIN: ICD-10-CM

## 2019-02-11 DIAGNOSIS — I73.9 PERIPHERAL VASCULAR DISEASE: ICD-10-CM

## 2019-02-11 DIAGNOSIS — M54.5 CHRONIC LOW BACK PAIN, UNSPECIFIED BACK PAIN LATERALITY, WITH SCIATICA PRESENCE UNSPECIFIED: Primary | ICD-10-CM

## 2019-02-11 DIAGNOSIS — E11.42 DIABETIC POLYNEUROPATHY ASSOCIATED WITH TYPE 2 DIABETES MELLITUS: ICD-10-CM

## 2019-02-11 DIAGNOSIS — Z79.4 TYPE 2 DIABETES MELLITUS WITH STAGE 3 CHRONIC KIDNEY DISEASE, WITH LONG-TERM CURRENT USE OF INSULIN: ICD-10-CM

## 2019-02-11 DIAGNOSIS — F11.90 CHRONIC, CONTINUOUS USE OF OPIOIDS: ICD-10-CM

## 2019-02-11 DIAGNOSIS — Z79.899 CHRONICALLY ON BENZODIAZEPINE THERAPY: ICD-10-CM

## 2019-02-11 DIAGNOSIS — G89.29 CHRONIC LOW BACK PAIN, UNSPECIFIED BACK PAIN LATERALITY, WITH SCIATICA PRESENCE UNSPECIFIED: Primary | ICD-10-CM

## 2019-02-11 DIAGNOSIS — F41.9 ANXIETY: ICD-10-CM

## 2019-02-11 DIAGNOSIS — N18.30 TYPE 2 DIABETES MELLITUS WITH STAGE 3 CHRONIC KIDNEY DISEASE, WITH LONG-TERM CURRENT USE OF INSULIN: ICD-10-CM

## 2019-02-11 LAB
ALBUMIN SERPL BCP-MCNC: 2.9 G/DL
ALP SERPL-CCNC: 99 U/L
ALT SERPL W/O P-5'-P-CCNC: 19 U/L
ANION GAP SERPL CALC-SCNC: 6 MMOL/L
AST SERPL-CCNC: 24 U/L
BILIRUB SERPL-MCNC: 0.4 MG/DL
BUN SERPL-MCNC: 34 MG/DL
CALCIUM SERPL-MCNC: 9.1 MG/DL
CHLORIDE SERPL-SCNC: 110 MMOL/L
CO2 SERPL-SCNC: 26 MMOL/L
CREAT SERPL-MCNC: 1.6 MG/DL
EST. GFR  (AFRICAN AMERICAN): 49.4 ML/MIN/1.73 M^2
EST. GFR  (NON AFRICAN AMERICAN): 42.7 ML/MIN/1.73 M^2
GLUCOSE SERPL-MCNC: 118 MG/DL
POTASSIUM SERPL-SCNC: 4.7 MMOL/L
PROT SERPL-MCNC: 6.7 G/DL
SODIUM SERPL-SCNC: 142 MMOL/L

## 2019-02-11 PROCEDURE — 11042 DBRDMT SUBQ TIS 1ST 20SQCM/<: CPT | Mod: S$GLB,,, | Performed by: PODIATRIST

## 2019-02-11 PROCEDURE — 3044F HG A1C LEVEL LT 7.0%: CPT | Mod: CPTII,S$GLB,, | Performed by: PHYSICIAN ASSISTANT

## 2019-02-11 PROCEDURE — 99999 PR PBB SHADOW E&M-EST. PATIENT-LVL III: CPT | Mod: PBBFAC,,, | Performed by: PODIATRIST

## 2019-02-11 PROCEDURE — 3078F PR MOST RECENT DIASTOLIC BLOOD PRESSURE < 80 MM HG: ICD-10-PCS | Mod: CPTII,S$GLB,, | Performed by: PHYSICIAN ASSISTANT

## 2019-02-11 PROCEDURE — 99214 PR OFFICE/OUTPT VISIT, EST, LEVL IV, 30-39 MIN: ICD-10-PCS | Mod: S$GLB,,, | Performed by: PHYSICIAN ASSISTANT

## 2019-02-11 PROCEDURE — 99999 PR PBB SHADOW E&M-EST. PATIENT-LVL III: ICD-10-PCS | Mod: PBBFAC,,, | Performed by: PODIATRIST

## 2019-02-11 PROCEDURE — 11042 PR DEBRIDEMENT, SKIN, SUB-Q TISSUE,=<20 SQ CM: ICD-10-PCS | Mod: S$GLB,,, | Performed by: PODIATRIST

## 2019-02-11 PROCEDURE — 99999 PR PBB SHADOW E&M-EST. PATIENT-LVL III: CPT | Mod: PBBFAC,,, | Performed by: PHYSICIAN ASSISTANT

## 2019-02-11 PROCEDURE — 99499 RISK ADDL DX/OHS AUDIT: ICD-10-PCS | Mod: S$GLB,,, | Performed by: PHYSICIAN ASSISTANT

## 2019-02-11 PROCEDURE — 3074F PR MOST RECENT SYSTOLIC BLOOD PRESSURE < 130 MM HG: ICD-10-PCS | Mod: CPTII,S$GLB,, | Performed by: PHYSICIAN ASSISTANT

## 2019-02-11 PROCEDURE — 3078F DIAST BP <80 MM HG: CPT | Mod: CPTII,S$GLB,, | Performed by: PHYSICIAN ASSISTANT

## 2019-02-11 PROCEDURE — 99999 PR PBB SHADOW E&M-EST. PATIENT-LVL III: ICD-10-PCS | Mod: PBBFAC,,, | Performed by: PHYSICIAN ASSISTANT

## 2019-02-11 PROCEDURE — 99499 NO LOS: ICD-10-PCS | Mod: S$GLB,,, | Performed by: PODIATRIST

## 2019-02-11 PROCEDURE — 1101F PT FALLS ASSESS-DOCD LE1/YR: CPT | Mod: CPTII,S$GLB,, | Performed by: PHYSICIAN ASSISTANT

## 2019-02-11 PROCEDURE — 99499 UNLISTED E&M SERVICE: CPT | Mod: S$GLB,,, | Performed by: PHYSICIAN ASSISTANT

## 2019-02-11 PROCEDURE — 36415 COLL VENOUS BLD VENIPUNCTURE: CPT | Mod: PO

## 2019-02-11 PROCEDURE — 83036 HEMOGLOBIN GLYCOSYLATED A1C: CPT

## 2019-02-11 PROCEDURE — 99499 UNLISTED E&M SERVICE: CPT | Mod: S$GLB,,, | Performed by: PODIATRIST

## 2019-02-11 PROCEDURE — 80053 COMPREHEN METABOLIC PANEL: CPT

## 2019-02-11 PROCEDURE — 99214 OFFICE O/P EST MOD 30 MIN: CPT | Mod: S$GLB,,, | Performed by: PHYSICIAN ASSISTANT

## 2019-02-11 PROCEDURE — 3044F PR MOST RECENT HEMOGLOBIN A1C LEVEL <7.0%: ICD-10-PCS | Mod: CPTII,S$GLB,, | Performed by: PHYSICIAN ASSISTANT

## 2019-02-11 PROCEDURE — 3074F SYST BP LT 130 MM HG: CPT | Mod: CPTII,S$GLB,, | Performed by: PHYSICIAN ASSISTANT

## 2019-02-11 PROCEDURE — 1101F PR PT FALLS ASSESS DOC 0-1 FALLS W/OUT INJ PAST YR: ICD-10-PCS | Mod: CPTII,S$GLB,, | Performed by: PHYSICIAN ASSISTANT

## 2019-02-11 RX ORDER — ESCITALOPRAM OXALATE 20 MG/1
20 TABLET ORAL DAILY
Qty: 90 TABLET | Refills: 3 | Status: SHIPPED | OUTPATIENT
Start: 2019-02-11 | End: 2019-06-17 | Stop reason: SDUPTHER

## 2019-02-11 NOTE — PROCEDURES
"Wound Debridement  Date/Time: 2/11/2019 8:57 AM  Performed by: Iván Torrez DPM  Authorized by: Iván Torrez DPM     Time out: Immediately prior to procedure a "time out" was called to verify the correct patient, procedure, equipment, support staff and site/side marked as required.    Consent Done?:  Yes (Verbal)  Local anesthesia used?: No      Wound Details:    Location:  Right foot    Location:  Right 5th Metatarsal Head    Type of Debridement:  Excisional       Length (cm):  1.5       Area (sq cm):  1.5       Width (cm):  1       Percent Debrided (%):  100       Depth (cm):  0.2       Total Area Debrided (sq cm):  1.5    Depth of debridement:  Subcutaneous tissue    Tissue debrided:  Dermis, Epidermis and Subcutaneous    Devitalized tissue debrided:  Biofilm, Callus and Slough    Instruments:  Nippers    Bleeding:  Minimal  Hemostasis Achieved: Yes    Method Used:  Pressure  Patient tolerance:  Patient tolerated the procedure well with no immediate complications      "

## 2019-02-11 NOTE — PROGRESS NOTES
Subjective:       Patient ID: Richard Bustos is a 73 y.o. male.    Chief Complaint: Medication Refill    Patient with controlled type 2 diabetes mellitus, chronic kidney disease stage 3, polyneuropathy, chronic back pain with chronic continuous opioid dependence, and uncontrolled chronic anxiety with chronic continuous been so the diazepam use.  Presents for routine 3 month follow-up.  Regarding his diabetes his blood sugars well controlled.  He is due for A1c.  He is compliant with his medication.  He does not follow a diabetic diet or exercise.  Regarding his stage 3 kidney disease this has been stable.  Regarding his chronic back pain neuropathy and opioid use he states that his pain is currently controlled with hydrocodone 7.5 mg twice daily.  His last dose was yesterday.  Regarding his anxiety he states that his symptoms are not controlled.  He is taking Lexapro 10 mg daily and Xanax 1 mg in the evening time.  He states that he feels anxious in the morning and daytime.  Patients patient medical/surgical, social and family histories have been reviewed         Review of Systems   Constitutional: Negative for activity change, appetite change, fatigue and unexpected weight change.   Respiratory: Negative for cough, chest tightness and shortness of breath.    Cardiovascular: Negative for chest pain, palpitations and leg swelling.   Gastrointestinal: Negative for abdominal pain, anal bleeding, blood in stool, constipation, diarrhea and nausea.   Endocrine: Negative for polydipsia and polyuria.   Genitourinary: Negative for difficulty urinating, frequency, hematuria and urgency.   Musculoskeletal: Positive for arthralgias, back pain and gait problem. Negative for joint swelling.   Skin: Negative for rash.   Neurological: Positive for weakness and numbness. Negative for dizziness and headaches.   Psychiatric/Behavioral: Negative for agitation, confusion, decreased concentration, dysphoric mood, self-injury, sleep  disturbance and suicidal ideas. The patient is nervous/anxious.        Objective:      Physical Exam   Constitutional: He is cooperative. He does not appear ill. No distress.   Obese body habitus      Cardiovascular: Normal rate, regular rhythm and normal heart sounds.   Pulmonary/Chest: Effort normal and breath sounds normal.   Musculoskeletal:        Right hip: He exhibits decreased range of motion and decreased strength.        Left hip: He exhibits decreased range of motion and decreased strength.        Lumbar back: He exhibits decreased range of motion. He exhibits no tenderness.   Neurological: He is alert.   Psychiatric: He has a normal mood and affect. His speech is normal and behavior is normal. Judgment and thought content normal. Cognition and memory are normal.   Vitals reviewed.      Assessment:       1. Chronic low back pain, unspecified back pain laterality, with sciatica presence unspecified    2. Chronic, continuous use of opioids    3. Anxiety    4. Chronically on benzodiazepine therapy    5. Type 2 diabetes mellitus with stage 3 chronic kidney disease, with long-term current use of insulin        Plan:         Richard was seen today for medication refill.    Diagnoses and all orders for this visit:    Chronic low back pain, unspecified back pain laterality, with sciatica presence unspecified    Chronic, continuous use of opioids  Pain contract is signed, up-to-date and on file.  Urine toxicology is up-to-date.   reviewed and without evidence of divergence.  Continue refills per PCP  Anxiety    Chronically on benzodiazepine therapy  Controlled substance contract is signed, up-to-date in on file.  Urine toxicology is up-to-date.   reviewed and with out evidence of divergence.  Continue refills per PCP  Do not recommend increasing the dose of Xanax.  Will increase Lexapro. -     escitalopram oxalate (LEXAPRO) 20 MG tablet; Take 1 tablet (20 mg total) by mouth once daily.    Type 2 diabetes  mellitus with stage 3 chronic kidney disease, with long-term current use of insulin  -     Hemoglobin A1c; Future  -     Comprehensive metabolic panel; Future  Further recommendations will be made based on results     bmi 42  Patient readiness: acceptance and barriers:social stressors and environmental    During the course of the visit the patient was educated and counseled about the following:     Diabetes:  Labs: hemoglobin A1C.  Hypertension:   Medication: no change.  Obesity:   Diet interventions: low calorie (1000 kCal/d) deficit diet.    Goals: Diabetes: Maintain Hemoglobin A1C below 7, Hypertension: Reduce Blood Pressure and Obesity: Reduce calorie intake and BMI    Did patient meet goals/outcomes: No    The following self management tools provided: blood pressure log  blood glucose log    Patient Instructions (the written plan) was given to the patient/family.     Time spent with patient: 15 minutes    Barriers to medications present (no )    Adverse reactions to current medications (no)    Over the counter medications reviewed (Yes)

## 2019-02-11 NOTE — PROGRESS NOTES
Subjective:      Patient ID: Richard Bustos is a 73 y.o. male.    Chief Complaint: Foot Ulcer (right)    Foot ulcer right foot, aggravated by increased weight bearing, shoe gear, pressure.  Covering wound right 5th mtp foot wound with wound foam and 1st mtpj with apertures with good improvement.  Denies signs infection, pain.  DM shoes/inserts today old.  Got  new ones last week- wearing today.    Did not get foot  xrays ordered 1/15/19  Review of Systems   Constitution: Negative for chills, diaphoresis, fever, malaise/fatigue and night sweats.   Cardiovascular: Positive for leg swelling. Negative for claudication, cyanosis and syncope.   Skin: Positive for nail changes and poor wound healing. Negative for color change, dry skin, rash, suspicious lesions and unusual hair distribution.   Musculoskeletal: Negative for falls, joint pain, joint swelling, muscle cramps, muscle weakness and stiffness.   Gastrointestinal: Negative for constipation, diarrhea, nausea and vomiting.   Neurological: Positive for paresthesias and sensory change. Negative for brief paralysis, disturbances in coordination, focal weakness, numbness and tremors.           Objective:      Physical Exam   Constitutional: He is oriented to person, place, and time. He appears well-developed and well-nourished. He is cooperative. No distress.   Cardiovascular:   Pulses:       Popliteal pulses are 2+ on the right side, and 2+ on the left side.        Dorsalis pedis pulses are 1+ on the right side, and 1+ on the left side.        Posterior tibial pulses are 1+ on the right side, and 1+ on the left side.   Capillary refill 3 seconds all toes/distal feet, all toes/both feet warm to touch.      Negative lymphadenopathy bilateral popliteal fossa and tarsal tunnel.     <2+ pitting lower extremity edema bilateral.     Musculoskeletal:        Right ankle: He exhibits normal range of motion, no swelling, no ecchymosis, no deformity, no laceration and normal pulse.  Achilles tendon normal. Achilles tendon exhibits no pain, no defect and normal Chung's test results.   Patient has hammertoes of digits  2-5 bilateral                 partially reducible without symptom today.    Tailor bunion right foot with deviated right 5th toe without signs trauma or loss of function right foot.    Othrwise, Normal angle, base, station of gait. All ten toes without clubbing, cyanosis, or signs of ischemia.  No pain to palpation bilateral lower extremities.  Range of motion, stability, muscle strength, and muscle tone normal bilateral feet and legs.     Lymphadenopathy: No inguinal adenopathy noted on the right or left side.   Negative lymphadenopathy bilateral popliteal fossa and tarsal tunnel.    Negative lymphangitic streaking bilateral feet/ankles/legs.   Neurological: He is alert and oriented to person, place, and time. He has normal strength. He displays no atrophy and no tremor. A sensory deficit is present. He exhibits normal muscle tone. Gait normal.   Reflex Scores:       Patellar reflexes are 2+ on the right side and 2+ on the left side.       Achilles reflexes are 2+ on the right side and 2+ on the left side.  Diminished/loss of protective sensation all toes bilateral to 10 gram monofilament.    Paresthesias, and burning bilateral feet with no clearly identified trigger or source.     Skin: Skin is warm, dry and intact. Capillary refill takes 2 to 3 seconds. No abrasion, no bruising, no burn, no ecchymosis, no laceration, no lesion and no rash noted. He is not diaphoretic. No cyanosis or erythema. No pallor. Nails show no clubbing.         Wound:  Lateral right 5th mtpj    Size:    Pre:[id number]  Post: [id number]    Base:  Granular, pink with moderate serous/serosanaguinous drainage only.  No pus, tracking, fluctuance, malodor, or cardinal signs infection.    Borders:   flat, pink, blanchable skin edges without undermining.    Wound:  Plantar right first mtpj closed today  Without  open ulceration, drainage, pus, tracking, fluctuance, malodor, or cardinal signs infection.         Hyperpigmentation right and left pretibial surfaces.    Otherwise, Skin thin, shiny, atrophic, with decreased density and distribution of pedal hair bilateral, but without hyperpigmentation, minerva discoloration,  ulcers, masses, nodules or cords palpated bilateral feet and legs.       Psychiatric: He has a normal mood and affect.             Assessment:       Encounter Diagnoses   Name Primary?    Right foot ulcer, with fat layer exposed Yes    Diabetic polyneuropathy associated with type 2 diabetes mellitus     Peripheral vascular disease          Plan:       Richard was seen today for foot ulcer.    Diagnoses and all orders for this visit:    Right foot ulcer, with fat layer exposed    Diabetic polyneuropathy associated with type 2 diabetes mellitus    Peripheral vascular disease      I counseled the patient on his conditions, their implications and medical management.    Dressed right foot wound with wound foam, kerlix - change same every other day.    Revert to sx shoe left, sx shoe right - ambulate minimally same.    Debride wound right  5th mtpj.    Adequate vitamin supplementation, protein intake, and hydration - discussed with patient.    Inspect feet multiple times daily for signs of occurrence/recurrence ulceration/infection.          Follow-up in about 1 week (around 2/18/2019).

## 2019-02-12 DIAGNOSIS — F41.9 ANXIETY: ICD-10-CM

## 2019-02-12 DIAGNOSIS — G89.29 CHRONIC LOW BACK PAIN, UNSPECIFIED BACK PAIN LATERALITY, WITH SCIATICA PRESENCE UNSPECIFIED: ICD-10-CM

## 2019-02-12 DIAGNOSIS — M54.5 CHRONIC LOW BACK PAIN, UNSPECIFIED BACK PAIN LATERALITY, WITH SCIATICA PRESENCE UNSPECIFIED: ICD-10-CM

## 2019-02-12 LAB
ESTIMATED AVG GLUCOSE: 131 MG/DL
HBA1C MFR BLD HPLC: 6.2 %

## 2019-02-12 RX ORDER — ALPRAZOLAM 1 MG/1
TABLET ORAL
Qty: 30 TABLET | Refills: 0 | Status: SHIPPED | OUTPATIENT
Start: 2019-02-12 | End: 2019-03-12 | Stop reason: SDUPTHER

## 2019-02-12 RX ORDER — HYDROCODONE BITARTRATE AND ACETAMINOPHEN 7.5; 325 MG/1; MG/1
1 TABLET ORAL EVERY 6 HOURS PRN
Qty: 90 TABLET | Refills: 0 | Status: SHIPPED | OUTPATIENT
Start: 2019-02-12 | End: 2019-03-12 | Stop reason: SDUPTHER

## 2019-02-18 ENCOUNTER — OFFICE VISIT (OUTPATIENT)
Dept: PODIATRY | Facility: CLINIC | Age: 74
End: 2019-02-18
Payer: MEDICARE

## 2019-02-18 VITALS
HEART RATE: 55 BPM | WEIGHT: 288.13 LBS | HEIGHT: 69 IN | BODY MASS INDEX: 42.67 KG/M2 | SYSTOLIC BLOOD PRESSURE: 142 MMHG | DIASTOLIC BLOOD PRESSURE: 78 MMHG

## 2019-02-18 DIAGNOSIS — L97.512 RIGHT FOOT ULCER, WITH FAT LAYER EXPOSED: Primary | ICD-10-CM

## 2019-02-18 DIAGNOSIS — M21.621 TAILOR'S BUNION OF RIGHT FOOT: ICD-10-CM

## 2019-02-18 DIAGNOSIS — I73.9 PERIPHERAL VASCULAR DISEASE: ICD-10-CM

## 2019-02-18 DIAGNOSIS — E11.42 DIABETIC POLYNEUROPATHY ASSOCIATED WITH TYPE 2 DIABETES MELLITUS: ICD-10-CM

## 2019-02-18 PROCEDURE — 99999 PR PBB SHADOW E&M-EST. PATIENT-LVL III: CPT | Mod: PBBFAC,,, | Performed by: PODIATRIST

## 2019-02-18 PROCEDURE — 11042 PR DEBRIDEMENT, SKIN, SUB-Q TISSUE,=<20 SQ CM: ICD-10-PCS | Mod: S$GLB,,, | Performed by: PODIATRIST

## 2019-02-18 PROCEDURE — 99999 PR PBB SHADOW E&M-EST. PATIENT-LVL III: ICD-10-PCS | Mod: PBBFAC,,, | Performed by: PODIATRIST

## 2019-02-18 PROCEDURE — 11042 DBRDMT SUBQ TIS 1ST 20SQCM/<: CPT | Mod: S$GLB,,, | Performed by: PODIATRIST

## 2019-02-18 PROCEDURE — 99499 UNLISTED E&M SERVICE: CPT | Mod: S$GLB,,, | Performed by: PODIATRIST

## 2019-02-18 PROCEDURE — 99499 NO LOS: ICD-10-PCS | Mod: S$GLB,,, | Performed by: PODIATRIST

## 2019-02-18 NOTE — PROGRESS NOTES
Subjective:      Patient ID: Richard Bustos is a 73 y.o. male.    Chief Complaint: Foot Ulcer (right)    Foot ulcer right foot, aggravated by increased weight bearing, shoe gear, pressure.  Covering wound right 5th mtp foot wound with wound foam and 1st mtpj with apertures with good improvement.  Denies signs infection, pain.  DM shoes/inserts today old.  Got  new ones last week- wearing today.    Did not get foot  xrays ordered 1/15/19  Review of Systems   Constitution: Negative for chills, diaphoresis, fever, malaise/fatigue and night sweats.   Cardiovascular: Positive for leg swelling. Negative for claudication, cyanosis and syncope.   Skin: Positive for nail changes and poor wound healing. Negative for color change, dry skin, rash, suspicious lesions and unusual hair distribution.   Musculoskeletal: Negative for falls, joint pain, joint swelling, muscle cramps, muscle weakness and stiffness.   Gastrointestinal: Negative for constipation, diarrhea, nausea and vomiting.   Neurological: Positive for paresthesias and sensory change. Negative for brief paralysis, disturbances in coordination, focal weakness, numbness and tremors.           Objective:      Physical Exam   Constitutional: He is oriented to person, place, and time. He appears well-developed and well-nourished. He is cooperative. No distress.   Cardiovascular:   Pulses:       Popliteal pulses are 2+ on the right side, and 2+ on the left side.        Dorsalis pedis pulses are 1+ on the right side, and 1+ on the left side.        Posterior tibial pulses are 1+ on the right side, and 1+ on the left side.   Capillary refill 3 seconds all toes/distal feet, all toes/both feet warm to touch.      Negative lymphadenopathy bilateral popliteal fossa and tarsal tunnel.     <2+ pitting lower extremity edema bilateral.     Musculoskeletal:        Right ankle: He exhibits normal range of motion, no swelling, no ecchymosis, no deformity, no laceration and normal pulse.  Achilles tendon normal. Achilles tendon exhibits no pain, no defect and normal Chung's test results.   Patient has hammertoes of digits  2-5 bilateral                 partially reducible without symptom today.    Tailor bunion right foot with deviated right 5th toe without signs trauma or loss of function right foot.    Othrwise, Normal angle, base, station of gait. All ten toes without clubbing, cyanosis, or signs of ischemia.  No pain to palpation bilateral lower extremities.  Range of motion, stability, muscle strength, and muscle tone normal bilateral feet and legs.     Lymphadenopathy: No inguinal adenopathy noted on the right or left side.   Negative lymphadenopathy bilateral popliteal fossa and tarsal tunnel.    Negative lymphangitic streaking bilateral feet/ankles/legs.   Neurological: He is alert and oriented to person, place, and time. He has normal strength. He displays no atrophy and no tremor. A sensory deficit is present. He exhibits normal muscle tone. Gait normal.   Reflex Scores:       Patellar reflexes are 2+ on the right side and 2+ on the left side.       Achilles reflexes are 2+ on the right side and 2+ on the left side.  Diminished/loss of protective sensation all toes bilateral to 10 gram monofilament.    Paresthesias, and burning bilateral feet with no clearly identified trigger or source.     Skin: Skin is warm, dry and intact. Capillary refill takes 2 to 3 seconds. No abrasion, no bruising, no burn, no ecchymosis, no laceration, no lesion and no rash noted. He is not diaphoretic. No cyanosis or erythema. No pallor. Nails show no clubbing.         Wound:  Lateral right 5th mtpj    Size:    Pre:[id number]  Post: [id number]    Base:  Granular, pink with moderate serous/serosanaguinous drainage only.  No pus, tracking, fluctuance, malodor, or cardinal signs infection.    Borders:   flat, pink, blanchable skin edges without undermining.    Wound:  Plantar right first mtpj remains closed today   Without open ulceration, drainage, pus, tracking, fluctuance, malodor, or cardinal signs infection.         Hyperpigmentation right and left pretibial surfaces.    Otherwise, Skin thin, shiny, atrophic, with decreased density and distribution of pedal hair bilateral, but without hyperpigmentation, minerva discoloration,  ulcers, masses, nodules or cords palpated bilateral feet and legs.       Psychiatric: He has a normal mood and affect.             Assessment:       Encounter Diagnoses   Name Primary?    Right foot ulcer, with fat layer exposed Yes    Diabetic polyneuropathy associated with type 2 diabetes mellitus     Peripheral vascular disease     Tailor's bunion of right foot          Plan:       Richard was seen today for foot ulcer.    Diagnoses and all orders for this visit:    Right foot ulcer, with fat layer exposed    Diabetic polyneuropathy associated with type 2 diabetes mellitus    Peripheral vascular disease    Tailor's bunion of right foot      I counseled the patient on his conditions, their implications and medical management.    Dressed right foot wound with wound foam, kerlix - change same every other day.    Revert to sx shoe left, sx shoe right - ambulate minimally same.    Debride wound right  5th mtpj.    Adequate vitamin supplementation, protein intake, and hydration - discussed with patient.    Inspect feet multiple times daily for signs of occurrence/recurrence ulceration/infection.          Follow-up in about 1 week (around 2/25/2019).

## 2019-02-18 NOTE — PROCEDURES
"Wound Debridement  Date/Time: 2/18/2019 9:23 AM  Performed by: Iván Torrez DPM  Authorized by: Iván Torrez DPM     Time out: Immediately prior to procedure a "time out" was called to verify the correct patient, procedure, equipment, support staff and site/side marked as required.    Consent Done?:  Yes (Verbal)  Local anesthesia used?: No      Wound Details:    Location:  Right foot    Location:  Right 5th Metatarsal Head    Type of Debridement:  Excisional       Length (cm):  0.9       Area (sq cm):  0.45       Width (cm):  0.5       Percent Debrided (%):  100       Depth (cm):  0.2       Total Area Debrided (sq cm):  0.45    Depth of debridement:  Subcutaneous tissue    Tissue debrided:  Dermis, Epidermis and Subcutaneous    Devitalized tissue debrided:  Biofilm and Callus    Instruments:  Blade    Bleeding:  Minimal  Hemostasis Achieved: Yes    Method Used:  Pressure  Patient tolerance:  Patient tolerated the procedure well with no immediate complications      "

## 2019-02-25 ENCOUNTER — OFFICE VISIT (OUTPATIENT)
Dept: PODIATRY | Facility: CLINIC | Age: 74
End: 2019-02-25
Payer: MEDICARE

## 2019-02-25 VITALS — HEIGHT: 69 IN | WEIGHT: 288.13 LBS | BODY MASS INDEX: 42.67 KG/M2

## 2019-02-25 DIAGNOSIS — L97.512 RIGHT FOOT ULCER, WITH FAT LAYER EXPOSED: Primary | ICD-10-CM

## 2019-02-25 DIAGNOSIS — I73.9 PERIPHERAL VASCULAR DISEASE: ICD-10-CM

## 2019-02-25 DIAGNOSIS — E11.42 DIABETIC POLYNEUROPATHY ASSOCIATED WITH TYPE 2 DIABETES MELLITUS: ICD-10-CM

## 2019-02-25 DIAGNOSIS — M21.621 TAILOR'S BUNION OF RIGHT FOOT: ICD-10-CM

## 2019-02-25 PROCEDURE — 99999 PR PBB SHADOW E&M-EST. PATIENT-LVL III: CPT | Mod: PBBFAC,,, | Performed by: PODIATRIST

## 2019-02-25 PROCEDURE — 99499 UNLISTED E&M SERVICE: CPT | Mod: S$GLB,,, | Performed by: PODIATRIST

## 2019-02-25 PROCEDURE — 99499 NO LOS: ICD-10-PCS | Mod: S$GLB,,, | Performed by: PODIATRIST

## 2019-02-25 PROCEDURE — 99999 PR PBB SHADOW E&M-EST. PATIENT-LVL III: ICD-10-PCS | Mod: PBBFAC,,, | Performed by: PODIATRIST

## 2019-02-25 PROCEDURE — 11042 DBRDMT SUBQ TIS 1ST 20SQCM/<: CPT | Mod: S$GLB,,, | Performed by: PODIATRIST

## 2019-02-25 PROCEDURE — 11042 WOUND DEBRIDEMENT: ICD-10-PCS | Mod: S$GLB,,, | Performed by: PODIATRIST

## 2019-02-25 NOTE — PROGRESS NOTES
Subjective:      Patient ID: Richard Bustos is a 73 y.o. male.    Chief Complaint: Follow-up (right foot ulcer)    Foot ulcer right foot, aggravated by increased weight bearing, shoe gear, pressure.  Covering wound right 5th mtp foot wound with wound foam and 1st mtpj with apertures with good improvement.  Denies signs infection, pain.  DM shoes/inserts today old.  Got  new ones last week- wearing today.    Did not get foot  xrays ordered 1/15/19  Review of Systems   Constitution: Negative for chills, diaphoresis, fever, malaise/fatigue and night sweats.   Cardiovascular: Positive for leg swelling. Negative for claudication, cyanosis and syncope.   Skin: Positive for nail changes and poor wound healing. Negative for color change, dry skin, rash, suspicious lesions and unusual hair distribution.   Musculoskeletal: Negative for falls, joint pain, joint swelling, muscle cramps, muscle weakness and stiffness.   Gastrointestinal: Negative for constipation, diarrhea, nausea and vomiting.   Neurological: Positive for paresthesias and sensory change. Negative for brief paralysis, disturbances in coordination, focal weakness, numbness and tremors.           Objective:      Physical Exam   Constitutional: He is oriented to person, place, and time. He appears well-developed and well-nourished. He is cooperative. No distress.   Cardiovascular:   Pulses:       Popliteal pulses are 2+ on the right side, and 2+ on the left side.        Dorsalis pedis pulses are 1+ on the right side, and 1+ on the left side.        Posterior tibial pulses are 1+ on the right side, and 1+ on the left side.   Capillary refill 3 seconds all toes/distal feet, all toes/both feet warm to touch.      Negative lymphadenopathy bilateral popliteal fossa and tarsal tunnel.     <2+ pitting lower extremity edema bilateral.     Musculoskeletal:        Right ankle: He exhibits normal range of motion, no swelling, no ecchymosis, no deformity, no laceration and  normal pulse. Achilles tendon normal. Achilles tendon exhibits no pain, no defect and normal Chung's test results.   Patient has hammertoes of digits  2-5 bilateral                 partially reducible without symptom today.    Tailor bunion right foot with deviated right 5th toe without signs trauma or loss of function right foot.    Othrwise, Normal angle, base, station of gait. All ten toes without clubbing, cyanosis, or signs of ischemia.  No pain to palpation bilateral lower extremities.  Range of motion, stability, muscle strength, and muscle tone normal bilateral feet and legs.     Lymphadenopathy: No inguinal adenopathy noted on the right or left side.   Negative lymphadenopathy bilateral popliteal fossa and tarsal tunnel.    Negative lymphangitic streaking bilateral feet/ankles/legs.   Neurological: He is alert and oriented to person, place, and time. He has normal strength. He displays no atrophy and no tremor. A sensory deficit is present. He exhibits normal muscle tone. Gait normal.   Reflex Scores:       Patellar reflexes are 2+ on the right side and 2+ on the left side.       Achilles reflexes are 2+ on the right side and 2+ on the left side.  Diminished/loss of protective sensation all toes bilateral to 10 gram monofilament.    Paresthesias, and burning bilateral feet with no clearly identified trigger or source.     Skin: Skin is warm, dry and intact. Capillary refill takes 2 to 3 seconds. No abrasion, no bruising, no burn, no ecchymosis, no laceration, no lesion and no rash noted. He is not diaphoretic. No cyanosis or erythema. No pallor. Nails show no clubbing.         Wound:  Lateral right 5th mtpj    Size:    Pre:[id number]  Post: [id number]    Base:  Granular, pink with moderate serous/serosanaguinous drainage only.  No pus, tracking, fluctuance, malodor, or cardinal signs infection.    Borders:   flat, pink, blanchable skin edges without undermining.    Wound:  Plantar right first mtpj remains  closed today  Without open ulceration, drainage, pus, tracking, fluctuance, malodor, or cardinal signs infection.         Hyperpigmentation right and left pretibial surfaces.    Otherwise, Skin thin, shiny, atrophic, with decreased density and distribution of pedal hair bilateral, but without hyperpigmentation, minerva discoloration,  ulcers, masses, nodules or cords palpated bilateral feet and legs.       Psychiatric: He has a normal mood and affect.             Assessment:       Encounter Diagnoses   Name Primary?    Right foot ulcer, with fat layer exposed Yes    Diabetic polyneuropathy associated with type 2 diabetes mellitus     Peripheral vascular disease     Tailor's bunion of right foot          Plan:       Richard was seen today for follow-up.    Diagnoses and all orders for this visit:    Right foot ulcer, with fat layer exposed    Diabetic polyneuropathy associated with type 2 diabetes mellitus    Peripheral vascular disease    Tailor's bunion of right foot      I counseled the patient on his conditions, their implications and medical management.    Dressed right foot wound with aperture, wound foam, kerlix - change same every other day.    Revert to sx shoe left, sx shoe right - ambulate minimally same.    Debride wound right  5th mtpj.    Adequate vitamin supplementation, protein intake, and hydration - discussed with patient.    Inspect feet multiple times daily for signs of occurrence/recurrence ulceration/infection.          Follow-up in about 1 week (around 3/4/2019).

## 2019-02-25 NOTE — PROCEDURES
"Wound Debridement  Date/Time: 2/25/2019 9:13 AM  Performed by: Iván Torrez DPM  Authorized by: Iván Torrez DPM     Time out: Immediately prior to procedure a "time out" was called to verify the correct patient, procedure, equipment, support staff and site/side marked as required.    Consent Done?:  Yes (Verbal)  Local anesthesia used?: No      Wound Details:    Location:  Right foot    Location:  Right 5th Metatarsal Head    Type of Debridement:  Excisional       Length (cm):  0.8       Area (sq cm):  0.32       Width (cm):  0.4       Percent Debrided (%):  100       Depth (cm):  0.2       Total Area Debrided (sq cm):  0.32    Depth of debridement:  Subcutaneous tissue    Tissue debrided:  Dermis, Epidermis and Subcutaneous    Devitalized tissue debrided:  Biofilm and Callus    Instruments:  Blade    Bleeding:  Minimal  Hemostasis Achieved: Yes    Method Used:  Pressure  Patient tolerance:  Patient tolerated the procedure well with no immediate complications      "

## 2019-03-06 ENCOUNTER — OFFICE VISIT (OUTPATIENT)
Dept: PODIATRY | Facility: CLINIC | Age: 74
End: 2019-03-06
Payer: MEDICARE

## 2019-03-06 VITALS
DIASTOLIC BLOOD PRESSURE: 66 MMHG | BODY MASS INDEX: 42.67 KG/M2 | WEIGHT: 288.13 LBS | HEIGHT: 69 IN | HEART RATE: 51 BPM | SYSTOLIC BLOOD PRESSURE: 133 MMHG

## 2019-03-06 DIAGNOSIS — L97.511 RIGHT FOOT ULCER, LIMITED TO BREAKDOWN OF SKIN: Primary | ICD-10-CM

## 2019-03-06 DIAGNOSIS — F41.9 ANXIETY: ICD-10-CM

## 2019-03-06 DIAGNOSIS — M79.671 PAIN IN RIGHT FOOT: ICD-10-CM

## 2019-03-06 DIAGNOSIS — G89.29 CHRONIC LOW BACK PAIN, UNSPECIFIED BACK PAIN LATERALITY, WITH SCIATICA PRESENCE UNSPECIFIED: ICD-10-CM

## 2019-03-06 DIAGNOSIS — I73.9 PERIPHERAL VASCULAR DISEASE: ICD-10-CM

## 2019-03-06 DIAGNOSIS — E11.42 DIABETIC POLYNEUROPATHY ASSOCIATED WITH TYPE 2 DIABETES MELLITUS: ICD-10-CM

## 2019-03-06 DIAGNOSIS — M54.5 CHRONIC LOW BACK PAIN, UNSPECIFIED BACK PAIN LATERALITY, WITH SCIATICA PRESENCE UNSPECIFIED: ICD-10-CM

## 2019-03-06 DIAGNOSIS — L97.911 ULCER OF RIGHT LOWER LEG, LIMITED TO BREAKDOWN OF SKIN: ICD-10-CM

## 2019-03-06 PROCEDURE — 97597 PR DEBRIDEMENT OPEN WOUND 20 SQ CM<: ICD-10-PCS | Mod: S$GLB,,, | Performed by: PODIATRIST

## 2019-03-06 PROCEDURE — 1101F PT FALLS ASSESS-DOCD LE1/YR: CPT | Mod: CPTII,S$GLB,, | Performed by: PODIATRIST

## 2019-03-06 PROCEDURE — 99999 PR PBB SHADOW E&M-EST. PATIENT-LVL III: ICD-10-PCS | Mod: PBBFAC,,, | Performed by: PODIATRIST

## 2019-03-06 PROCEDURE — 3044F HG A1C LEVEL LT 7.0%: CPT | Mod: CPTII,S$GLB,, | Performed by: PODIATRIST

## 2019-03-06 PROCEDURE — 97597 DBRDMT OPN WND 1ST 20 CM/<: CPT | Mod: S$GLB,,, | Performed by: PODIATRIST

## 2019-03-06 PROCEDURE — 99213 OFFICE O/P EST LOW 20 MIN: CPT | Mod: 25,S$GLB,, | Performed by: PODIATRIST

## 2019-03-06 PROCEDURE — 1101F PR PT FALLS ASSESS DOC 0-1 FALLS W/OUT INJ PAST YR: ICD-10-PCS | Mod: CPTII,S$GLB,, | Performed by: PODIATRIST

## 2019-03-06 PROCEDURE — 99213 PR OFFICE/OUTPT VISIT, EST, LEVL III, 20-29 MIN: ICD-10-PCS | Mod: 25,S$GLB,, | Performed by: PODIATRIST

## 2019-03-06 PROCEDURE — 3075F PR MOST RECENT SYSTOLIC BLOOD PRESS GE 130-139MM HG: ICD-10-PCS | Mod: CPTII,S$GLB,, | Performed by: PODIATRIST

## 2019-03-06 PROCEDURE — 3075F SYST BP GE 130 - 139MM HG: CPT | Mod: CPTII,S$GLB,, | Performed by: PODIATRIST

## 2019-03-06 PROCEDURE — 3044F PR MOST RECENT HEMOGLOBIN A1C LEVEL <7.0%: ICD-10-PCS | Mod: CPTII,S$GLB,, | Performed by: PODIATRIST

## 2019-03-06 PROCEDURE — 3078F PR MOST RECENT DIASTOLIC BLOOD PRESSURE < 80 MM HG: ICD-10-PCS | Mod: CPTII,S$GLB,, | Performed by: PODIATRIST

## 2019-03-06 PROCEDURE — 3078F DIAST BP <80 MM HG: CPT | Mod: CPTII,S$GLB,, | Performed by: PODIATRIST

## 2019-03-06 PROCEDURE — 99999 PR PBB SHADOW E&M-EST. PATIENT-LVL III: CPT | Mod: PBBFAC,,, | Performed by: PODIATRIST

## 2019-03-06 RX ORDER — ALPRAZOLAM 1 MG/1
TABLET ORAL
Qty: 30 TABLET | Refills: 0 | OUTPATIENT
Start: 2019-03-06

## 2019-03-06 RX ORDER — HYDROCODONE BITARTRATE AND ACETAMINOPHEN 7.5; 325 MG/1; MG/1
1 TABLET ORAL EVERY 6 HOURS PRN
Qty: 90 TABLET | Refills: 0 | OUTPATIENT
Start: 2019-03-06

## 2019-03-12 ENCOUNTER — OFFICE VISIT (OUTPATIENT)
Dept: PODIATRY | Facility: CLINIC | Age: 74
End: 2019-03-12
Payer: MEDICARE

## 2019-03-12 VITALS — BODY MASS INDEX: 42.67 KG/M2 | WEIGHT: 288.13 LBS | HEIGHT: 69 IN

## 2019-03-12 DIAGNOSIS — E11.42 DIABETIC POLYNEUROPATHY ASSOCIATED WITH TYPE 2 DIABETES MELLITUS: ICD-10-CM

## 2019-03-12 DIAGNOSIS — I48.0 PAROXYSMAL ATRIAL FIBRILLATION: ICD-10-CM

## 2019-03-12 DIAGNOSIS — D50.9 IRON DEFICIENCY ANEMIA: ICD-10-CM

## 2019-03-12 DIAGNOSIS — L97.512 RIGHT FOOT ULCER, WITH FAT LAYER EXPOSED: Primary | ICD-10-CM

## 2019-03-12 DIAGNOSIS — E78.5 HYPERLIPIDEMIA: ICD-10-CM

## 2019-03-12 DIAGNOSIS — G89.29 CHRONIC LOW BACK PAIN, UNSPECIFIED BACK PAIN LATERALITY, WITH SCIATICA PRESENCE UNSPECIFIED: ICD-10-CM

## 2019-03-12 DIAGNOSIS — Z15.89 MTHFR MUTATION (METHYLENETETRAHYDROFOLATE REDUCTASE): ICD-10-CM

## 2019-03-12 DIAGNOSIS — I73.9 PERIPHERAL VASCULAR DISEASE: ICD-10-CM

## 2019-03-12 DIAGNOSIS — M54.5 CHRONIC LOW BACK PAIN, UNSPECIFIED BACK PAIN LATERALITY, WITH SCIATICA PRESENCE UNSPECIFIED: ICD-10-CM

## 2019-03-12 DIAGNOSIS — F41.9 ANXIETY: ICD-10-CM

## 2019-03-12 PROCEDURE — 99999 PR PBB SHADOW E&M-EST. PATIENT-LVL III: CPT | Mod: PBBFAC,,, | Performed by: PODIATRIST

## 2019-03-12 PROCEDURE — 99999 PR PBB SHADOW E&M-EST. PATIENT-LVL III: ICD-10-PCS | Mod: PBBFAC,,, | Performed by: PODIATRIST

## 2019-03-12 PROCEDURE — 11042 PR DEBRIDEMENT, SKIN, SUB-Q TISSUE,=<20 SQ CM: ICD-10-PCS | Mod: S$GLB,,, | Performed by: PODIATRIST

## 2019-03-12 PROCEDURE — 11042 DBRDMT SUBQ TIS 1ST 20SQCM/<: CPT | Mod: S$GLB,,, | Performed by: PODIATRIST

## 2019-03-12 PROCEDURE — 99499 UNLISTED E&M SERVICE: CPT | Mod: S$GLB,,, | Performed by: PODIATRIST

## 2019-03-12 PROCEDURE — 99499 NO LOS: ICD-10-PCS | Mod: S$GLB,,, | Performed by: PODIATRIST

## 2019-03-12 RX ORDER — WARFARIN SODIUM 5 MG/1
5 TABLET ORAL DAILY
Qty: 90 TABLET | Refills: 3 | Status: ON HOLD | OUTPATIENT
Start: 2019-03-12 | End: 2019-10-21 | Stop reason: HOSPADM

## 2019-03-12 RX ORDER — CARVEDILOL 25 MG/1
25 TABLET ORAL 2 TIMES DAILY WITH MEALS
Qty: 180 TABLET | Refills: 3 | Status: SHIPPED | OUTPATIENT
Start: 2019-03-12 | End: 2019-06-03 | Stop reason: SDUPTHER

## 2019-03-12 RX ORDER — ATORVASTATIN CALCIUM 80 MG/1
80 TABLET, FILM COATED ORAL DAILY
Qty: 90 TABLET | Refills: 3 | Status: SHIPPED | OUTPATIENT
Start: 2019-03-12 | End: 2020-03-08

## 2019-03-12 RX ORDER — FERROUS SULFATE 325(65) MG
1 TABLET ORAL 2 TIMES DAILY
Qty: 180 TABLET | Refills: 3 | Status: SHIPPED | OUTPATIENT
Start: 2019-03-12 | End: 2019-03-25 | Stop reason: SDUPTHER

## 2019-03-12 RX ORDER — NITROGLYCERIN 400 UG/1
SPRAY ORAL
Qty: 4.9 G | Refills: 3 | Status: SHIPPED | OUTPATIENT
Start: 2019-03-12 | End: 2019-11-22 | Stop reason: SDUPTHER

## 2019-03-12 RX ORDER — ALENDRONATE SODIUM AND CHOLECALCIFEROL 70; 2800 MG/1; [IU]/1
TABLET ORAL
Refills: 5 | COMMUNITY
Start: 2019-03-06 | End: 2020-01-09

## 2019-03-12 RX ORDER — HYDROCODONE BITARTRATE AND ACETAMINOPHEN 7.5; 325 MG/1; MG/1
1 TABLET ORAL EVERY 6 HOURS PRN
Qty: 90 TABLET | Refills: 0 | Status: SHIPPED | OUTPATIENT
Start: 2019-03-14 | End: 2019-04-12 | Stop reason: SDUPTHER

## 2019-03-12 RX ORDER — ALPRAZOLAM 1 MG/1
TABLET ORAL
Qty: 30 TABLET | Refills: 0 | Status: SHIPPED | OUTPATIENT
Start: 2019-03-14 | End: 2019-04-12 | Stop reason: SDUPTHER

## 2019-03-12 NOTE — PROGRESS NOTES
Subjective:      Patient ID: Richard Bustos is a 73 y.o. male.    Chief Complaint: Follow-up (right leg wound)    Foot ulcer right foot, aggravated by increased weight bearing, shoe gear, pressure.  Covering wound right 5th mtp foot wound with wound foam and 1st mtpj with apertures with good improvement.  Denies signs infection, pain.  DM shoes/inserts today old.  Got  new ones last week- wearing today.    Did not get foot  xrays ordered 1/15/19  Review of Systems   Constitution: Negative for chills, diaphoresis, fever, malaise/fatigue and night sweats.   Cardiovascular: Positive for leg swelling. Negative for claudication, cyanosis and syncope.   Skin: Positive for nail changes and poor wound healing. Negative for color change, dry skin, rash, suspicious lesions and unusual hair distribution.   Musculoskeletal: Negative for falls, joint pain, joint swelling, muscle cramps, muscle weakness and stiffness.   Gastrointestinal: Negative for constipation, diarrhea, nausea and vomiting.   Neurological: Positive for paresthesias and sensory change. Negative for brief paralysis, disturbances in coordination, focal weakness, numbness and tremors.           Objective:      Physical Exam   Constitutional: He is oriented to person, place, and time. He appears well-developed and well-nourished. He is cooperative. No distress.   Cardiovascular:   Pulses:       Popliteal pulses are 2+ on the right side, and 2+ on the left side.        Dorsalis pedis pulses are 1+ on the right side, and 1+ on the left side.        Posterior tibial pulses are 1+ on the right side, and 1+ on the left side.   Capillary refill 3 seconds all toes/distal feet, all toes/both feet warm to touch.      Negative lymphadenopathy bilateral popliteal fossa and tarsal tunnel.     <2+ pitting lower extremity edema bilateral.     Musculoskeletal:        Right ankle: He exhibits normal range of motion, no swelling, no ecchymosis, no deformity, no laceration and  normal pulse. Achilles tendon normal. Achilles tendon exhibits no pain, no defect and normal Chung's test results.   Patient has hammertoes of digits  2-5 bilateral                 partially reducible without symptom today.    Tailor bunion right foot with deviated right 5th toe without signs trauma or loss of function right foot.    Othrwise, Normal angle, base, station of gait. All ten toes without clubbing, cyanosis, or signs of ischemia.  No pain to palpation bilateral lower extremities.  Range of motion, stability, muscle strength, and muscle tone normal bilateral feet and legs.     Lymphadenopathy: No inguinal adenopathy noted on the right or left side.   Negative lymphadenopathy bilateral popliteal fossa and tarsal tunnel.    Negative lymphangitic streaking bilateral feet/ankles/legs.   Neurological: He is alert and oriented to person, place, and time. He has normal strength. He displays no atrophy and no tremor. A sensory deficit is present. He exhibits normal muscle tone. Gait normal.   Reflex Scores:       Patellar reflexes are 2+ on the right side and 2+ on the left side.       Achilles reflexes are 2+ on the right side and 2+ on the left side.  Diminished/loss of protective sensation all toes bilateral to 10 gram monofilament.    Paresthesias, and burning bilateral feet with no clearly identified trigger or source.     Skin: Skin is warm, dry and intact. Capillary refill takes 2 to 3 seconds. No abrasion, no bruising, no burn, no ecchymosis, no laceration, no lesion and no rash noted. He is not diaphoretic. No cyanosis or erythema. No pallor. Nails show no clubbing.         Wound:  Lateral right 5th mtpj    Size:    Pre:[id number]  Post: [id number]    Base:  Granular, pink with moderate serous/serosanaguinous drainage only.  No pus, tracking, fluctuance, malodor, or cardinal signs infection.    Borders:   flat, pink, blanchable skin edges without undermining.    Hyperpigmentation right and left pretibial  surfaces.    Otherwise, Skin thin, shiny, atrophic, with decreased density and distribution of pedal hair bilateral, but without hyperpigmentation, minerva discoloration,  ulcers, masses, nodules or cords palpated bilateral feet and legs.       Psychiatric: He has a normal mood and affect.             Assessment:       Encounter Diagnoses   Name Primary?    Right foot ulcer, with fat layer exposed Yes    Diabetic polyneuropathy associated with type 2 diabetes mellitus     Peripheral vascular disease          Plan:       Richard was seen today for follow-up.    Diagnoses and all orders for this visit:    Right foot ulcer, with fat layer exposed    Diabetic polyneuropathy associated with type 2 diabetes mellitus    Peripheral vascular disease      I counseled the patient on his conditions, their implications and medical management.    Dressed right foot wound with aperture, hydrofera blue, wound foam, kerlix - change same every other day.    Revert to sx shoe left, sx shoe right - ambulate minimally same.    Have new DM shoes stretched at right 5th mtpj.    Debride wound right  5th mtpj.    Adequate vitamin supplementation, protein intake, and hydration - discussed with patient.    Inspect feet multiple times daily for signs of occurrence/recurrence ulceration/infection.          Follow-up in about 1 week (around 3/19/2019).

## 2019-03-12 NOTE — PROCEDURES
"Wound Debridement  Date/Time: 3/12/2019 10:48 AM  Performed by: Iván Torrez DPM  Authorized by: Iván Torrez DPM     Time out: Immediately prior to procedure a "time out" was called to verify the correct patient, procedure, equipment, support staff and site/side marked as required.    Consent Done?:  Yes (Verbal)  Local anesthesia used?: No      Wound Details:    Location:  Right foot    Location:  Right 5th Metatarsal Head    Type of Debridement:  Excisional       Length (cm):  0.8       Area (sq cm):  0.4       Width (cm):  0.5       Percent Debrided (%):  100       Depth (cm):  0.2       Total Area Debrided (sq cm):  0.4    Depth of debridement:  Subcutaneous tissue    Tissue debrided:  Dermis, Epidermis and Subcutaneous    Devitalized tissue debrided:  Biofilm and Callus    Instruments:  Nippers    Bleeding:  Minimal  Hemostasis Achieved: Yes    Method Used:  Pressure  Patient tolerance:  Patient tolerated the procedure well with no immediate complications      "

## 2019-03-18 DIAGNOSIS — I25.10 CORONARY ARTERY DISEASE INVOLVING NATIVE CORONARY ARTERY OF NATIVE HEART WITHOUT ANGINA PECTORIS: Chronic | ICD-10-CM

## 2019-03-18 RX ORDER — ESCITALOPRAM OXALATE 10 MG/1
TABLET ORAL
Qty: 90 TABLET | Refills: 0 | OUTPATIENT
Start: 2019-03-18

## 2019-03-18 RX ORDER — CLOPIDOGREL BISULFATE 75 MG/1
TABLET ORAL
Qty: 90 TABLET | Refills: 3 | Status: SHIPPED | OUTPATIENT
Start: 2019-03-18 | End: 2019-06-03 | Stop reason: SDUPTHER

## 2019-03-24 PROBLEM — M79.671 PAIN IN RIGHT FOOT: Status: ACTIVE | Noted: 2019-03-24

## 2019-03-24 PROBLEM — L97.511 RIGHT FOOT ULCER, LIMITED TO BREAKDOWN OF SKIN: Status: ACTIVE | Noted: 2019-03-24

## 2019-03-24 PROBLEM — L97.911: Status: ACTIVE | Noted: 2019-03-24

## 2019-03-24 NOTE — PROGRESS NOTES
----- Message from RT Nehal sent at 3/17/2017  9:36 AM CDT -----  Contact: pt    pt , requesting an appt today, if possible, with NP, ZAINAB Cárdenas, for possible sinus infection, thanks.   Subjective:      Patient ID: Richard Bustos is a 73 y.o. male.    Chief Complaint: Foot Ulcer (bottom right foot and leg  PCP  Familia Ramirez  5/12/18  Ashley Little ([place])  2/11/19); Callouses (right side of foot); and Diabetes Mellitus (A1C 6.2   2/11/19)      HPI:  Richard Bustos is a 73 y.o. male who presents to clinic with a chief complaint of RLE ulcers.  He is a patient of Dr. Torrez and is not known to me.  He reports pain more as a burning sensation and rates it as 4/10 on pain scale.  He reports following Dr. Torrez's recommendations.  Patient denies any other pedal complaints this time.    PCP:  Not on file  Date last seen:  Unknown    Review of Systems   Constitutional: Negative for appetite change, fever, chills, fatigue and unexpected weight change.   Cardiovascular: Negative for chest pain, claudication, cyanosis.  Positive for  leg swelling.  Musculoskeletal: Negative for back pain, arthritis, joint pain, joint swelling, myalgias, and stiffness.   Skin: Negative for nail bed changes, rash, itching, suspicious lesion.  Positive for discoloration, poor wound healing, and unusual hair distribution.   Neurological: Negative for loss of balance and numbness.  Positive for paresthesias and sensory change.  Hematological: Negative for adenopathy, bleeding, and bruising easily.   Psychiatric/Behavioral: The patient is not nervous/anxious.  Negative for altered mental status.    Hemoglobin A1C   Date Value Ref Range Status   02/11/2019 6.2 (H) 4.0 - 5.6 % Final     Comment:     ADA Screening Guidelines:  5.7-6.4%  Consistent with prediabetes  >or=6.5%  Consistent with diabetes  High levels of fetal hemoglobin interfere with the HbA1C  assay. Heterozygous hemoglobin variants (HbS, HgC, etc)do  not significantly interfere with this assay.   However, presence of multiple variants may affect accuracy.     10/22/2018 5.9 (H) 4.0 - 5.6 % Final     Comment:     ADA Screening Guidelines:  5.7-6.4%  Consistent with  prediabetes  >or=6.5%  Consistent with diabetes  High levels of fetal hemoglobin interfere with the HbA1C  assay. Heterozygous hemoglobin variants (HbS, HgC, etc)do  not significantly interfere with this assay.   However, presence of multiple variants may affect accuracy.     10/07/2018 6.0 (H) 4.0 - 5.6 % Final     Comment:     ADA Screening Guidelines:  5.7-6.4%  Consistent with prediabetes  >or=6.5%  Consistent with diabetes  High levels of fetal hemoglobin interfere with the HbA1C  assay. Heterozygous hemoglobin variants (HbS, HgC, etc)do  not significantly interfere with this assay.   However, presence of multiple variants may affect accuracy.     06/17/2013 6.8 (H) 4.8 - 5.6 % Final     Comment:              Increased risk for diabetes: 5.7 - 6.4           Diabetes: >6.4           Glycemic control for adults with diabetes: <7.0  **In order to standardize %HbA1c results worldwide, as of October 11, 2010,  the %HbA1c is being calculated using the master equation recommended in the  consensus statement adopted by the ADA (American Diabetes Assoc), EASD  (European Assoc for the Study of Diabetes), IFCC (International Federation  of Clinical Chemistry and Laboratory Medicine) and IDF (International  Diabetes Federation). Result units: %HgbA1c (DCCT/NGSP).  In common with other methods, Hb A1C values may not accurately reflect mean  blood glucose in patients with hemoglobin variants (HgbF, HgbS and HgbC).  Any cause of shortened erythrocyte survival will reduce exposure of  erythrocytes to glucose with a consequent decrease in HbA1c (%) values, even  though the time-averaged blood glucose level may be elevated. Causes of  shortened erythrocyte lifetime might be hemolytic anemia or other hemolytic  diseases, homozygous sickle cell trait, pregnancy, recent significant or  chronic blood loss, etc. Caution should be used when interpreting the HbA1c  results from patients with these conditions.       Past Medical  History:   Diagnosis Date    Anemia     Anemia of other chronic disease 9/13/2017    Anemia, chronic renal failure 9/13/2017    Anemia, unspecified 9/13/2017    Anticoagulant long-term use     Aorta aneurysm     Arthritis     Atrial fibrillation     CAD (coronary artery disease)     CHF (congestive heart failure)     Chronic kidney disease     Clotting disorder 9/13/2017    Colon polyp     Diabetes mellitus     not on meds    Diabetes mellitus type II     Diverticulosis     Elevated PSA     Encounter for blood transfusion     Former smoker     GERD (gastroesophageal reflux disease)     Gout     Hx of colonic polyps     Hypercoagulable state     Hyperlipidemia     Hypertension     MI, old 2010    Myocardial infarction     9/2010    Osteomyelitis of ankle or foot 3/9/2017    Prostate cancer 06/2016    Sleep apnea     Squamous cell carcinoma 01/23/2018    Left helix, imiquimod    Venous stasis     Chronic     Past Surgical History:   Procedure Laterality Date    ABDOMINAL SURGERY      BLOCK-NERVE-MEDIAL BRANCH-LUMBAR Bilateral 7/13/2017    [facility]    BLOCK-NERVE-MEDIAL BRANCH-LUMBAR Bilateral 6/22/2017    [facility]    CARDIAC SURGERY      COLONOSCOPY  02/2011    diverticulosis with diverticula with clot, no active bleeding    COLONOSCOPY N/A 8/24/2016    [facility]    CYSTOSCOPY N/A 5/23/2016    [facility] at [facility]    GASTRIC BYPASS  2/5/2008    IVC FILTER RETRIEVAL      JOINT REPLACEMENT  1996 and 2001    bi-lat hip replacement/Rt Hip and Lt Hip    RADIOFREQUENCY THERMOCOAGULATION (RFTC)-NERVE-MEDIAN BRANCH-LUMBAR Bilateral 8/3/2017    [facility] OR    ROTATOR CUFF REPAIR      Rt shoulder    Stents  8/18/2010    x 3    TRANSRECTAL ULTRASOUND GUIDED PROSTATE BIOPSY N/A 8/1/2016    [facility] at  [facility] OR    TRANSRECTAL ULTRASOUND GUIDED PROSTATE BIOPSY N/A 5/23/2016    [facility]    UPPER GASTROINTESTINAL ENDOSCOPY  02/2011     Family History   Problem Relation Age of Onset    Heart attack Mother     Heart attack Father     Heart attack Brother     Ulcerative colitis Daughter 35    Lupus Daughter     Colon cancer Neg Hx     Colon polyps Neg Hx     Crohn's disease Neg Hx     Melanoma Neg Hx     Psoriasis Neg Hx     Eczema Neg Hx      Social History     Socioeconomic History    Marital status:      Spouse name: None    Number of children: None    Years of education: None    Highest education level: None   Occupational History    None   Social Needs    Financial resource strain: None    Food insecurity:     Worry: None     Inability: None    Transportation needs:     Medical: None     Non-medical: None   Tobacco Use    Smoking status: Former Smoker    Smokeless tobacco: Never Used    Tobacco comment: 45 yrs ago, only smoked 3-4 packs in his entire life as a teenager   Substance and Sexual Activity    Alcohol use: Yes     Comment: rare    Drug use: No    Sexual activity: None   Lifestyle    Physical activity:     Days per week: None     Minutes per session: None    Stress: None   Relationships    Social connections:     Talks on phone: None     Gets together: None     Attends [employer] service: None     Active member of club or organization: None     Attends meetings of clubs or organizations: None     Relationship status: None    Intimate partner violence:     Fear of current or ex partner: None     Emotionally abused: None     Physically abused: None     Forced sexual activity: None   Other Topics Concern    None   Social History Narrative    None           Objective:        /66 (BP Location: Right arm, Patient Position: Sitting)   Pulse (!) 51   Ht 5' 9" (1.753 m)   Wt 130.7 kg (288 lb 2.3 oz)   BMI 42.55 kg/m² "     Physical Exam   Constitutional: Patient is oriented to person, place, and time. Patient appears well-developed and well-nourished. No acute distress.     Psychiatric: Patient has a normal mood and affect. Patient's speech is normal and behavior is normal. Judgment is normal. Cognition and memory are normal.     Right pedal exam was performed today.  Vascular: Pedal pulses palpable 1/4 DP & PT.  CFT is > 3 seconds to the hallux.  Skin temperature is cool to cool proximal tibia to distal toes without localized increase in calor noted.  +1/4 pitting edema noted to the RLE. No erythema or ecchymosis noted to the foot or ankle.  Hair growth absent distally to the LE.     Musculoskeletal: Ankle joint ROM is decreased. Subtalar joint ROM is decreased.  Midtarsal joint ROM is decreased.  1st ray ROM is decreased.  1st  MTPJ ROM is decreased.  Ankle joint dorsiflexion is restricted with the knee extended and flexed per Silfverskiold exam.    Muscle strength is 5/5 for all LE muscle groups tested.  5th digit is abducted on the 5th ray.    Neurological:  Epicritic sensation is Decreased to the foot.   Achilles DTR and Chaddock STR are Decreased to right lower extremity.   Mild tenderness upon debridement and palpation all 3 wounds RLE.    Dermatological: Webspaces 1-4 right are clean, dry, and intact.  Skin turgor is supple.   Ulceration noted sub right 1st metatarsal head measuring 0.1 cm x 0.2 cm x 0.1 cm.  Ulceration noted to the lateral aspect of the right 5th MTPJ measuring 0.8 cm x 0.3 cm x 0.1 cm.  Ulceration noted to the right anterior lower leg measuring 2.4 cm x 2.9 cm x 0.1 cm.  No minerva signs of infection, undermining, sinus tracking, or probing to bone appreciable to any of the 3 RLE ulcers.    Nursing note and vitals reviewed.        Assessment:       Encounter Diagnoses   Name Primary?    Right foot ulcer, limited to breakdown of skin Yes    Ulcer of right lower leg, limited to breakdown of skin     Pain  in right foot     Diabetic polyneuropathy associated with type 2 diabetes mellitus     Peripheral vascular disease          Plan:       Richard was seen today for foot ulcer, callouses and diabetes mellitus.    Diagnoses and all orders for this visit:    Right foot ulcer, limited to breakdown of skin    Ulcer of right lower leg, limited to breakdown of skin    Pain in right foot    Diabetic polyneuropathy associated with type 2 diabetes mellitus    Peripheral vascular disease      I counseled the patient on his conditions, their implications and medical management.    - With patient's permission, cleansed wounds right foot sub 1st metatarsal head and lateral to 5th MTPJ as well as right anterior lower leg with normal saline and performed sharp, selective debridement of devitalized tissue and biofilm, < 20 cm sq., extending down to the level of dermis via 3 mm dermal curette to patient's tolerance without incident.  Applied aperture pad, antibiotic cream, Mepilex, and Aquacel foam to wounds.    - Recommended patient apply topical lidocaine gel to wounds as needed for pain relief.    Patient was given the following recommendations and instructions:  Patient Instructions   - Continue daily dressing changes with aperture pad, antibiotic ointment, Mepilex, and aquacel foam as directed to foot and lower leg wounds.    - Notify clinic if any new or worsening condition arises.    - Follow up in 1 week with Dr. Torrez for wound care.        Sallie Ponce DPM        Dictation was performed using M*Modal Fluency.  Transcription errors may be present.

## 2019-03-24 NOTE — PATIENT INSTRUCTIONS
- Continue daily dressing changes with aperture pad, antibiotic ointment, Mepilex, and aquacel foam as directed to foot and lower leg wounds.    - Notify clinic if any new or worsening condition arises.    - Follow up in 1 week with Dr. Torrez for wound care.

## 2019-03-25 ENCOUNTER — HOSPITAL ENCOUNTER (OUTPATIENT)
Dept: RADIOLOGY | Facility: CLINIC | Age: 74
Discharge: HOME | End: 2019-03-25
Attending: PODIATRIST
Payer: MEDICARE

## 2019-03-25 ENCOUNTER — OFFICE VISIT (OUTPATIENT)
Dept: PODIATRY | Facility: CLINIC | Age: 74
End: 2019-03-25
Payer: MEDICARE

## 2019-03-25 VITALS
WEIGHT: 288.13 LBS | SYSTOLIC BLOOD PRESSURE: 152 MMHG | BODY MASS INDEX: 42.67 KG/M2 | HEIGHT: 69 IN | DIASTOLIC BLOOD PRESSURE: 78 MMHG | HEART RATE: 64 BPM

## 2019-03-25 DIAGNOSIS — L97.512 TOE ULCER, RIGHT, WITH FAT LAYER EXPOSED: ICD-10-CM

## 2019-03-25 DIAGNOSIS — K21.9 GASTROESOPHAGEAL REFLUX DISEASE, ESOPHAGITIS PRESENCE NOT SPECIFIED: ICD-10-CM

## 2019-03-25 DIAGNOSIS — I10 ESSENTIAL HYPERTENSION: ICD-10-CM

## 2019-03-25 DIAGNOSIS — L97.512 TOE ULCER, RIGHT, WITH FAT LAYER EXPOSED: Primary | ICD-10-CM

## 2019-03-25 DIAGNOSIS — E11.49 TYPE II DIABETES MELLITUS WITH NEUROLOGICAL MANIFESTATIONS: ICD-10-CM

## 2019-03-25 DIAGNOSIS — L03.031 CELLULITIS OF TOE, RIGHT: ICD-10-CM

## 2019-03-25 DIAGNOSIS — I73.9 PVD (PERIPHERAL VASCULAR DISEASE): ICD-10-CM

## 2019-03-25 DIAGNOSIS — I51.9 LV DYSFUNCTION: ICD-10-CM

## 2019-03-25 DIAGNOSIS — L97.512 SKIN ULCER OF RIGHT FOOT WITH FAT LAYER EXPOSED: ICD-10-CM

## 2019-03-25 DIAGNOSIS — D50.9 IRON DEFICIENCY ANEMIA: ICD-10-CM

## 2019-03-25 DIAGNOSIS — M10.9 ACUTE GOUT OF FOOT, UNSPECIFIED CAUSE, UNSPECIFIED LATERALITY: ICD-10-CM

## 2019-03-25 PROCEDURE — 99214 OFFICE O/P EST MOD 30 MIN: CPT | Mod: 25,S$GLB,, | Performed by: PODIATRIST

## 2019-03-25 PROCEDURE — 73630 X-RAY EXAM OF FOOT: CPT | Mod: 26,RT,S$GLB, | Performed by: RADIOLOGY

## 2019-03-25 PROCEDURE — 11042 DBRDMT SUBQ TIS 1ST 20SQCM/<: CPT | Mod: S$GLB,,, | Performed by: PODIATRIST

## 2019-03-25 PROCEDURE — 11042 WOUND DEBRIDEMENT: ICD-10-PCS | Mod: S$GLB,,, | Performed by: PODIATRIST

## 2019-03-25 PROCEDURE — 3078F PR MOST RECENT DIASTOLIC BLOOD PRESSURE < 80 MM HG: ICD-10-PCS | Mod: CPTII,S$GLB,, | Performed by: PODIATRIST

## 2019-03-25 PROCEDURE — 99999 PR PBB SHADOW E&M-EST. PATIENT-LVL III: ICD-10-PCS | Mod: PBBFAC,,, | Performed by: PODIATRIST

## 2019-03-25 PROCEDURE — 1101F PT FALLS ASSESS-DOCD LE1/YR: CPT | Mod: CPTII,S$GLB,, | Performed by: PODIATRIST

## 2019-03-25 PROCEDURE — 99999 PR PBB SHADOW E&M-EST. PATIENT-LVL III: CPT | Mod: PBBFAC,,, | Performed by: PODIATRIST

## 2019-03-25 PROCEDURE — 1101F PR PT FALLS ASSESS DOC 0-1 FALLS W/OUT INJ PAST YR: ICD-10-PCS | Mod: CPTII,S$GLB,, | Performed by: PODIATRIST

## 2019-03-25 PROCEDURE — 3077F SYST BP >= 140 MM HG: CPT | Mod: CPTII,S$GLB,, | Performed by: PODIATRIST

## 2019-03-25 PROCEDURE — 73630 XR FOOT COMPLETE 3 VIEW RIGHT: ICD-10-PCS | Mod: 26,RT,S$GLB, | Performed by: RADIOLOGY

## 2019-03-25 PROCEDURE — 3077F PR MOST RECENT SYSTOLIC BLOOD PRESSURE >= 140 MM HG: ICD-10-PCS | Mod: CPTII,S$GLB,, | Performed by: PODIATRIST

## 2019-03-25 PROCEDURE — 3044F PR MOST RECENT HEMOGLOBIN A1C LEVEL <7.0%: ICD-10-PCS | Mod: CPTII,S$GLB,, | Performed by: PODIATRIST

## 2019-03-25 PROCEDURE — 73630 X-RAY EXAM OF FOOT: CPT | Mod: TC,FY,PO,RT

## 2019-03-25 PROCEDURE — 3078F DIAST BP <80 MM HG: CPT | Mod: CPTII,S$GLB,, | Performed by: PODIATRIST

## 2019-03-25 PROCEDURE — 3044F HG A1C LEVEL LT 7.0%: CPT | Mod: CPTII,S$GLB,, | Performed by: PODIATRIST

## 2019-03-25 PROCEDURE — 99214 PR OFFICE/OUTPT VISIT, EST, LEVL IV, 30-39 MIN: ICD-10-PCS | Mod: 25,S$GLB,, | Performed by: PODIATRIST

## 2019-03-25 RX ORDER — ALLOPURINOL 100 MG/1
TABLET ORAL
Qty: 90 TABLET | Refills: 3 | Status: SHIPPED | OUTPATIENT
Start: 2019-03-25 | End: 2020-04-11

## 2019-03-25 RX ORDER — FERROUS SULFATE 325(65) MG
TABLET ORAL
Qty: 180 TABLET | Refills: 0 | Status: SHIPPED | OUTPATIENT
Start: 2019-03-25 | End: 2019-04-12 | Stop reason: SDUPTHER

## 2019-03-25 RX ORDER — CLINDAMYCIN HYDROCHLORIDE 300 MG/1
300 CAPSULE ORAL 3 TIMES DAILY
Qty: 30 CAPSULE | Refills: 0 | Status: SHIPPED | OUTPATIENT
Start: 2019-03-25 | End: 2019-04-04

## 2019-03-25 RX ORDER — LISINOPRIL 40 MG/1
TABLET ORAL
Qty: 90 TABLET | Refills: 3 | Status: ON HOLD | OUTPATIENT
Start: 2019-03-25 | End: 2019-10-21 | Stop reason: HOSPADM

## 2019-03-25 NOTE — PROGRESS NOTES
Subjective:      Patient ID: Richard Bustos is a 73 y.o. male.    Chief Complaint: Follow-up (wound) and Other Misc ([facility]- 2/11/19)    Richard is a 73 y.o. male who presents to the clinic for evaluation and treatment of high risk feet. Richard has a past medical history of Anemia, Anemia of other chronic disease (9/13/2017), Anemia, chronic renal failure (9/13/2017), Anemia, unspecified (9/13/2017), Anticoagulant long-term use, Aorta aneurysm, Arthritis, Atrial fibrillation, CAD (coronary artery disease), CHF (congestive heart failure), Chronic kidney disease, Clotting disorder (9/13/2017), Colon polyp, Diabetes mellitus, Diabetes mellitus type II, Diverticulosis, Elevated PSA, Encounter for blood transfusion, Former smoker, GERD (gastroesophageal reflux disease), Gout, colonic polyps, Hypercoagulable state, Hyperlipidemia, Hypertension, MI, old (2010), Myocardial infarction, Osteomyelitis of ankle or foot (3/9/2017), Prostate cancer (06/2016), Sleep apnea, Squamous cell carcinoma (01/23/2018), and Venous stasis. The patient's chief complaint is diabetic foot ulcer, toes 2,3 right.  Unknown onset, discovered this morning changing dressing no change since.  aggravated by increased weight bearing, shoe gear, pressure.  No previous medical treatment.  No self treatment .     CC2 thick long misshapen toenails all ten toes.  Gradual onset, worsening over past several weeks, aggravated by increased weight bearing, shoe gear, pressure.  Periodic debridement helps symptoms. .    PCP: Familia Ramirez Jr, MD    Date Last Seen by PCP:   Chief Complaint   Patient presents with    Follow-up     wound    Other Misc     Sheryl [facility]- 2/11/19         Current shoe gear:  Affected Foot: sx shoe right     Unaffected Foot: Rx diabetic extra depth shoes and custom accommodative insoles    History of Trauma: negative  Sign of Infection: none systemically - local findings below.    Hemoglobin A1C   Date Value Ref Range Status    02/11/2019 6.2 (H) 4.0 - 5.6 % Final     Comment:     ADA Screening Guidelines:  5.7-6.4%  Consistent with prediabetes  >or=6.5%  Consistent with diabetes  High levels of fetal hemoglobin interfere with the HbA1C  assay. Heterozygous hemoglobin variants (HbS, HgC, etc)do  not significantly interfere with this assay.   However, presence of multiple variants may affect accuracy.     10/22/2018 5.9 (H) 4.0 - 5.6 % Final     Comment:     ADA Screening Guidelines:  5.7-6.4%  Consistent with prediabetes  >or=6.5%  Consistent with diabetes  High levels of fetal hemoglobin interfere with the HbA1C  assay. Heterozygous hemoglobin variants (HbS, HgC, etc)do  not significantly interfere with this assay.   However, presence of multiple variants may affect accuracy.     10/07/2018 6.0 (H) 4.0 - 5.6 % Final     Comment:     ADA Screening Guidelines:  5.7-6.4%  Consistent with prediabetes  >or=6.5%  Consistent with diabetes  High levels of fetal hemoglobin interfere with the HbA1C  assay. Heterozygous hemoglobin variants (HbS, HgC, etc)do  not significantly interfere with this assay.   However, presence of multiple variants may affect accuracy.     06/17/2013 6.8 (H) 4.8 - 5.6 % Final     Comment:              Increased risk for diabetes: 5.7 - 6.4           Diabetes: >6.4           Glycemic control for adults with diabetes: <7.0  **In order to standardize %HbA1c results worldwide, as of October 11, 2010,  the %HbA1c is being calculated using the master equation recommended in the  consensus statement adopted by the ADA (American Diabetes Assoc), EASD  (European Assoc for the Study of Diabetes), IFCC (International Federation  of Clinical Chemistry and Laboratory Medicine) and IDF (International  Diabetes Federation). Result units: %HgbA1c (DCCT/NGSP).  In common with other methods, Hb A1C values may not accurately reflect mean  blood glucose in patients with hemoglobin variants (HgbF, HgbS and HgbC).  Any cause of shortened  erythrocyte survival will reduce exposure of  erythrocytes to glucose with a consequent decrease in HbA1c (%) values, even  though the time-averaged blood glucose level may be elevated. Causes of  shortened erythrocyte lifetime might be hemolytic anemia or other hemolytic  diseases, homozygous sickle cell trait, pregnancy, recent significant or  chronic blood loss, etc. Caution should be used when interpreting the HbA1c  results from patients with these conditions.       Review of Systems   Constitution: Negative for chills, diaphoresis, fever, malaise/fatigue and night sweats.   Cardiovascular: Positive for leg swelling. Negative for claudication, cyanosis and syncope.   Skin: Positive for nail changes and poor wound healing. Negative for color change, dry skin, rash, suspicious lesions and unusual hair distribution.   Musculoskeletal: Negative for falls, joint pain, joint swelling, muscle cramps, muscle weakness and stiffness.   Gastrointestinal: Negative for constipation, diarrhea, nausea and vomiting.   Neurological: Positive for numbness, paresthesias and sensory change. Negative for brief paralysis, disturbances in coordination, focal weakness and tremors.           Objective:      Physical Exam   Constitutional: He appears well-developed and well-nourished. He is cooperative. No distress.   Cardiovascular:   Pulses:       Dorsalis pedis pulses are 1+ on the right side, and 1+ on the left side.        Posterior tibial pulses are 1+ on the right side, and 1+ on the left side.   Toes warm, pink, cap fill <5 seconds.    <2+ pitting edema both legs.   Musculoskeletal:   Normal angle, base, station of gait. All ten toes without clubbing, cyanosis, or signs of ischemia.  No pain to palpation bilateral lower extremities.  Range of motion, stability, muscle strength, and muscle tone normal bilateral feet and legs.    Lymphadenopathy:   Negative lymphadenopathy bilateral popliteal fossa and tarsal tunnel.      Neurological: He has normal strength. He displays no atrophy and no tremor. A sensory deficit is present. He exhibits normal muscle tone.   Reflex Scores:       Patellar reflexes are 1+ on the right side and 1+ on the left side.       Achilles reflexes are 1+ on the right side and 1+ on the left side.  Diminished/loss of protective sensation all toes bilateral to 10 gram monofilament.     Skin: Skin is warm and dry. No abrasion, no bruising, no burn, no ecchymosis, no laceration, no lesion and no rash noted. He is not diaphoretic. No erythema. No pallor.       Wound:plantar 2nd pipj right    Size:    Pre:[id number]  Post: [id number]    Base:  Granular, pink with moderate serous/serosanaguinous drainage only.  No pus, tracking, fluctuance, malodor, or cardinal signs infection.  Tendon exposed in base.    Borders:  Hyperkeratotic, debriding to flat, pink, blanchable skin edges without undermining.      Wound:  Dorsal 3rd toe pipj right    Size:    Pre:[id number]  Post: [id number]    Base:  Granular, pink with moderate serous/serosanaguinous drainage only.  No pus, tracking, fluctuance, malodor, or cardinal signs infection.  Tendon exposed in base.    Borders:  Hyperkeratotic, debriding to flat, pink, blanchable skin edges without undermining.                 Assessment:       Encounter Diagnoses   Name Primary?    Toe ulcer, right, with fat layer exposed Yes    Cellulitis of toe, right     Type II diabetes mellitus with neurological manifestations     PVD (peripheral vascular disease)          Plan:       Richard was seen today for follow-up and other misc.    Diagnoses and all orders for this visit:    Toe ulcer, right, with fat layer exposed  -     X-Ray Foot Complete Left; Future  -     Aerobic culture  -     Culture, Anaerobic  -     Basic metabolic panel; Future  -     CBC auto differential; Future  -     C-reactive protein; Future  -     Sedimentation rate; Future  -     Aerobic culture  -     Culture,  Anaerobic    Cellulitis of toe, right  -     X-Ray Foot Complete Left; Future  -     Aerobic culture  -     Culture, Anaerobic  -     Basic metabolic panel; Future  -     CBC auto differential; Future  -     C-reactive protein; Future  -     Sedimentation rate; Future  -     Aerobic culture  -     Culture, Anaerobic    Type II diabetes mellitus with neurological manifestations  -     X-Ray Foot Complete Left; Future  -     Aerobic culture  -     Culture, Anaerobic  -     Basic metabolic panel; Future  -     CBC auto differential; Future  -     C-reactive protein; Future  -     Sedimentation rate; Future  -     Aerobic culture  -     Culture, Anaerobic    PVD (peripheral vascular disease)  -     X-Ray Foot Complete Left; Future  -     Aerobic culture  -     Culture, Anaerobic  -     Basic metabolic panel; Future  -     CBC auto differential; Future  -     C-reactive protein; Future  -     Sedimentation rate; Future  -     Aerobic culture  -     Culture, Anaerobic    Other orders  -     clindamycin (CLEOCIN) 300 MG capsule; Take 1 capsule (300 mg total) by mouth 3 (three) times daily. for 10 days      I counseled the patient on his conditions, their implications and medical management.        Debride wounds.    Dressed same with nicole, wound foam, kerlix.  Change same every other day.    Ambulate minimally in sx shoe bilateral.    Adequate vitamin supplementation, protein intake, and hydration - discussed with patient.            Follow up in about 1 week (around 4/1/2019).

## 2019-03-25 NOTE — PROCEDURES
"Wound Debridement  Date/Time: 3/25/2019 2:15 PM  Performed by: Iván Torrez DPM  Authorized by: Iván Torrez DPM     Time out: Immediately prior to procedure a "time out" was called to verify the correct patient, procedure, equipment, support staff and site/side marked as required.    Consent Done?:  Yes (Verbal)  Local anesthesia used?: No      Wound Details:    Location:  Right foot    Location:  Right 2nd Toe    Type of Debridement:  Excisional       Length (cm):  1       Area (sq cm):  0.5       Width (cm):  0.5       Percent Debrided (%):  100       Depth (cm):  0.4       Total Area Debrided (sq cm):  0.5    Depth of debridement:  Subcutaneous tissue    Tissue debrided:  Dermis, Epidermis and Subcutaneous    Devitalized tissue debrided:  Biofilm, Callus and Necrotic/Eschar    Instruments:  Nippers    Additional wounds:  1    2nd Wound Details:     Location:  Right foot    Location:  Right 3rd Toe    Location:  Right 3rd Toe    Type of Debridement:  Excisional       Length (cm):  0.8       Area (sq cm):  0.32       Width (cm):  0.4       Percent Debrided (%):  100       Depth (cm):  0.3       Total Area Debrided (sq cm):  0.32    Depth of debridement:  Subcutaneous tissue    Tissue debrided:  Dermis, Epidermis and Subcutaneous    Devitalized tissue debrided:  Biofilm, Callus and Necrotic/Eschar    Instruments:  Nippers    Bleeding:  Minimal  Hemostasis Achieved: Yes    Method Used:  Pressure  Patient tolerance:  Patient tolerated the procedure well with no immediate complications      "

## 2019-03-26 ENCOUNTER — TELEPHONE (OUTPATIENT)
Dept: PODIATRY | Facility: CLINIC | Age: 74
End: 2019-03-26

## 2019-03-26 NOTE — TELEPHONE ENCOUNTER
Received call from lab stating that patient's cultures were transported at the wrong temperature and could not be run. Dr. Torrez informed.

## 2019-04-01 ENCOUNTER — OFFICE VISIT (OUTPATIENT)
Dept: PODIATRY | Facility: CLINIC | Age: 74
End: 2019-04-01
Payer: MEDICARE

## 2019-04-01 VITALS
DIASTOLIC BLOOD PRESSURE: 66 MMHG | WEIGHT: 288 LBS | SYSTOLIC BLOOD PRESSURE: 120 MMHG | HEIGHT: 69 IN | HEART RATE: 57 BPM | BODY MASS INDEX: 42.65 KG/M2

## 2019-04-01 DIAGNOSIS — L97.512 TOE ULCER, RIGHT, WITH FAT LAYER EXPOSED: Primary | ICD-10-CM

## 2019-04-01 DIAGNOSIS — E11.49 TYPE II DIABETES MELLITUS WITH NEUROLOGICAL MANIFESTATIONS: ICD-10-CM

## 2019-04-01 DIAGNOSIS — I73.9 PVD (PERIPHERAL VASCULAR DISEASE): ICD-10-CM

## 2019-04-01 PROCEDURE — 3044F HG A1C LEVEL LT 7.0%: CPT | Mod: CPTII,S$GLB,, | Performed by: PODIATRIST

## 2019-04-01 PROCEDURE — 3078F DIAST BP <80 MM HG: CPT | Mod: CPTII,S$GLB,, | Performed by: PODIATRIST

## 2019-04-01 PROCEDURE — 99212 OFFICE O/P EST SF 10 MIN: CPT | Mod: S$GLB,,, | Performed by: PODIATRIST

## 2019-04-01 PROCEDURE — 99999 PR PBB SHADOW E&M-EST. PATIENT-LVL III: CPT | Mod: PBBFAC,,, | Performed by: PODIATRIST

## 2019-04-01 PROCEDURE — 99212 PR OFFICE/OUTPT VISIT, EST, LEVL II, 10-19 MIN: ICD-10-PCS | Mod: S$GLB,,, | Performed by: PODIATRIST

## 2019-04-01 PROCEDURE — 3074F PR MOST RECENT SYSTOLIC BLOOD PRESSURE < 130 MM HG: ICD-10-PCS | Mod: CPTII,S$GLB,, | Performed by: PODIATRIST

## 2019-04-01 PROCEDURE — 3074F SYST BP LT 130 MM HG: CPT | Mod: CPTII,S$GLB,, | Performed by: PODIATRIST

## 2019-04-01 PROCEDURE — 1101F PR PT FALLS ASSESS DOC 0-1 FALLS W/OUT INJ PAST YR: ICD-10-PCS | Mod: CPTII,S$GLB,, | Performed by: PODIATRIST

## 2019-04-01 PROCEDURE — 3044F PR MOST RECENT HEMOGLOBIN A1C LEVEL <7.0%: ICD-10-PCS | Mod: CPTII,S$GLB,, | Performed by: PODIATRIST

## 2019-04-01 PROCEDURE — 3078F PR MOST RECENT DIASTOLIC BLOOD PRESSURE < 80 MM HG: ICD-10-PCS | Mod: CPTII,S$GLB,, | Performed by: PODIATRIST

## 2019-04-01 PROCEDURE — 1101F PT FALLS ASSESS-DOCD LE1/YR: CPT | Mod: CPTII,S$GLB,, | Performed by: PODIATRIST

## 2019-04-01 PROCEDURE — 99999 PR PBB SHADOW E&M-EST. PATIENT-LVL III: ICD-10-PCS | Mod: PBBFAC,,, | Performed by: PODIATRIST

## 2019-04-01 NOTE — PROGRESS NOTES
Subjective:      Patient ID: Richard Bustos is a 73 y.o. male.    Chief Complaint: Foot Ulcer (right)    Richard is a 73 y.o. male who presents to the clinic for evaluation and treatment of high risk feet. Richard has a past medical history of Anemia, Anemia of other chronic disease (9/13/2017), Anemia, chronic renal failure (9/13/2017), Anemia, unspecified (9/13/2017), Anticoagulant long-term use, Aorta aneurysm, Arthritis, Atrial fibrillation, CAD (coronary artery disease), CHF (congestive heart failure), Chronic kidney disease, Clotting disorder (9/13/2017), Colon polyp, Diabetes mellitus, Diabetes mellitus type II, Diverticulosis, Elevated PSA, Encounter for blood transfusion, Former smoker, GERD (gastroesophageal reflux disease), Gout, colonic polyps, Hypercoagulable state, Hyperlipidemia, Hypertension, MI, old (2010), Myocardial infarction, Osteomyelitis of ankle or foot (3/9/2017), Prostate cancer (06/2016), Sleep apnea, Squamous cell carcinoma (01/23/2018), and Venous stasis. The patient's chief complaint is diabetic foot ulcer, toes 2,3 right.  Unknown onset, discovered this morning changing dressing no change since.  aggravated by increased weight bearing, shoe gear, pressure.  Debridement, dressings, sx shoe ambulation helping   xrays negative osteolysis.  Cultures could not be plated due to temp. Taking clindamycin with improvement.      PCP: Familia Ramirez Jr, MD    Date Last Seen by PCP:   Chief Complaint   Patient presents with    Foot Ulcer     right         Current shoe gear:  Affected Foot: sx shoe right     Unaffected Foot: Rx diabetic extra depth shoes and custom accommodative insoles    History of Trauma: negative  Sign of Infection: none systemically - local findings below.    Hemoglobin A1C   Date Value Ref Range Status   02/11/2019 6.2 (H) 4.0 - 5.6 % Final     Comment:     ADA Screening Guidelines:  5.7-6.4%  Consistent with prediabetes  >or=6.5%  Consistent with diabetes  High levels of fetal  hemoglobin interfere with the HbA1C  assay. Heterozygous hemoglobin variants (HbS, HgC, etc)do  not significantly interfere with this assay.   However, presence of multiple variants may affect accuracy.     10/22/2018 5.9 (H) 4.0 - 5.6 % Final     Comment:     ADA Screening Guidelines:  5.7-6.4%  Consistent with prediabetes  >or=6.5%  Consistent with diabetes  High levels of fetal hemoglobin interfere with the HbA1C  assay. Heterozygous hemoglobin variants (HbS, HgC, etc)do  not significantly interfere with this assay.   However, presence of multiple variants may affect accuracy.     10/07/2018 6.0 (H) 4.0 - 5.6 % Final     Comment:     ADA Screening Guidelines:  5.7-6.4%  Consistent with prediabetes  >or=6.5%  Consistent with diabetes  High levels of fetal hemoglobin interfere with the HbA1C  assay. Heterozygous hemoglobin variants (HbS, HgC, etc)do  not significantly interfere with this assay.   However, presence of multiple variants may affect accuracy.     06/17/2013 6.8 (H) 4.8 - 5.6 % Final     Comment:              Increased risk for diabetes: 5.7 - 6.4           Diabetes: >6.4           Glycemic control for adults with diabetes: <7.0  **In order to standardize %HbA1c results worldwide, as of October 11, 2010,  the %HbA1c is being calculated using the master equation recommended in the  consensus statement adopted by the ADA (American Diabetes Assoc), EASD  (European Assoc for the Study of Diabetes), IFCC (International Federation  of Clinical Chemistry and Laboratory Medicine) and IDF (International  Diabetes Federation). Result units: %HgbA1c (DCCT/NGSP).  In common with other methods, Hb A1C values may not accurately reflect mean  blood glucose in patients with hemoglobin variants (HgbF, HgbS and HgbC).  Any cause of shortened erythrocyte survival will reduce exposure of  erythrocytes to glucose with a consequent decrease in HbA1c (%) values, even  though the time-averaged blood glucose level may be  elevated. Causes of  shortened erythrocyte lifetime might be hemolytic anemia or other hemolytic  diseases, homozygous sickle cell trait, pregnancy, recent significant or  chronic blood loss, etc. Caution should be used when interpreting the HbA1c  results from patients with these conditions.       Review of Systems   Constitution: Negative for chills, diaphoresis, fever, malaise/fatigue and night sweats.   Cardiovascular: Positive for leg swelling. Negative for claudication, cyanosis and syncope.   Skin: Positive for poor wound healing. Negative for color change, dry skin, nail changes, rash, suspicious lesions and unusual hair distribution.   Musculoskeletal: Negative for falls, joint pain, joint swelling, muscle cramps, muscle weakness and stiffness.   Gastrointestinal: Negative for constipation, diarrhea, nausea and vomiting.   Neurological: Positive for numbness, paresthesias and sensory change. Negative for brief paralysis, disturbances in coordination, focal weakness and tremors.           Objective:      Physical Exam   Constitutional: He appears well-developed and well-nourished. He is cooperative. No distress.   Cardiovascular:   Pulses:       Dorsalis pedis pulses are 1+ on the right side, and 1+ on the left side.        Posterior tibial pulses are 1+ on the right side, and 1+ on the left side.   Toes warm, pink, cap fill <5 seconds.    <2+ pitting edema both legs.   Musculoskeletal:   Normal angle, base, station of gait. All ten toes without clubbing, cyanosis, or signs of ischemia.  No pain to palpation bilateral lower extremities.  Range of motion, stability, muscle strength, and muscle tone normal bilateral feet and legs.    Lymphadenopathy:   Negative lymphadenopathy bilateral popliteal fossa and tarsal tunnel.     Neurological: He has normal strength. He displays no atrophy and no tremor. A sensory deficit is present. He exhibits normal muscle tone.   Reflex Scores:       Patellar reflexes are 1+ on  the right side and 1+ on the left side.       Achilles reflexes are 1+ on the right side and 1+ on the left side.  Diminished/loss of protective sensation all toes bilateral to 10 gram monofilament.     Skin: Skin is warm and dry. No abrasion, no bruising, no burn, no ecchymosis, no laceration, no lesion and no rash noted. He is not diaphoretic. No erythema. No pallor.       Wound:plantar 2nd pipj right    Size:    Pre:[id number]    Base:  Granular, pink with moderate serous/serosanaguinous drainage only.  No pus, tracking, fluctuance, malodor, or cardinal signs infection.  Tendon no longer visibly exposed in base.    Borders:  Hyperkeratotic, debriding to flat, pink, blanchable skin edges without undermining.      Wound:  Dorsal 3rd toe pipj right    Size:    Pre:[id number]    Base:  Granular, pink with moderate serous/serosanaguinous drainage only.  No pus, tracking, fluctuance, malodor, or cardinal signs infection.  Tendon exposed in base.    Borders:  Hyperkeratotic, debriding to flat, pink, blanchable skin edges without undermining.                 Assessment:       Encounter Diagnoses   Name Primary?    Toe ulcer, right, with fat layer exposed Yes    Type II diabetes mellitus with neurological manifestations     PVD (peripheral vascular disease)          Plan:       Richard was seen today for foot ulcer.    Diagnoses and all orders for this visit:    Toe ulcer, right, with fat layer exposed    Type II diabetes mellitus with neurological manifestations    PVD (peripheral vascular disease)      I counseled the patient on his conditions, their implications and medical management.        Finish antibiotic.    Dressed both wounds with nicole, wound foam, kerlix.  Change same every other day.    Ambulate minimally in sx shoe bilateral.    Adequate vitamin supplementation, protein intake, and hydration - discussed with patient.            Follow up in about 1 week (around 4/8/2019).

## 2019-04-02 NOTE — PROGRESS NOTES
[facility] Hematology/Oncology  PROGRESS NOTE      Subjective:       Patient ID:   NAME: Richard Bustos : 1945     73 y.o. male    Referring Doc: Ashley  Other Physicians: Bebeto Torrez Chamberlain    Chief Complaint:  Clot disorder f/u    History of Present Illness:     Patient returns today for a regularly scheduled follow-up visit.  The patient is doing ok overall with foot ulcer and infection on right foot slowly resolving. He has been under the care of [facility] and Dr Torrez with podiatry. Right foot with recurrent ulcers due to PVD.  He is breathing ok. He denies any current fever, CP, SOB, HA's or N/V. He is here with his daughter.             ROS:   GEN: normal without any fever, night sweats or weight loss  HEENT: normal with no HA's, sore throat, stiff neck, changes in vision  CV: normal with no CP, SOB, PND, THOMPSON or orthopnea  PULM: normal with no SOB, cough, hemoptysis, sputum or pleuritic pain  GI: normal with no abdominal pain, nausea, vomiting, constipation, diarrhea, melanotic stools, BRBPR, or hematemesis  : normal with no hematuria, dysuria  BREAST: normal with no mass, discharge, pain  SKIN: normal with no rash, erythema, bruising, or swelling    Allergies:  Review of patient's allergies indicates:   Allergen Reactions    Shellfish containing products Other (See Comments)     Other reaction(s): Gout       Medications:    Current Outpatient Medications:     allopurinol (ZYLOPRIM) 100 MG tablet, TAKE 1 TABLET(100 MG) BY MOUTH EVERY DAY, Disp: 90 tablet, Rfl: 3    ALPRAZolam (XANAX) 1 MG tablet, TAKE 1 TABLET(1 MG) BY MOUTH EVERY EVENING, Disp: 30 tablet, Rfl: 0    atorvastatin (LIPITOR) 80 MG tablet, Take 1 tablet (80 mg total) by mouth once daily., Disp: 90 tablet, Rfl: 3    calcitRIOL (ROCALTROL) 0.5 MCG Cap, TK 1 C PO D, Disp: , Rfl: 4    carvedilol (COREG) 25 MG tablet, Take 1 tablet (25 mg total) by mouth 2 (two) times daily with meals., Disp: 180 tablet, Rfl: 3    clindamycin  (CLEOCIN) 300 MG capsule, Take 1 capsule (300 mg total) by mouth 3 (three) times daily. for 10 days, Disp: 30 capsule, Rfl: 0    clopidogrel (PLAVIX) 75 mg tablet, TAKE 1 TABLET BY MOUTH DAILY, Disp: 90 tablet, Rfl: 3    ergocalciferol (ERGOCALCIFEROL) 50,000 unit Cap, TK 1 C PO Q WEEK, Disp: , Rfl: 0    escitalopram oxalate (LEXAPRO) 20 MG tablet, Take 1 tablet (20 mg total) by mouth once daily., Disp: 90 tablet, Rfl: 3    ferrous sulfate (FEOSOL) 325 mg (65 mg iron) Tab tablet, TAKE 1 TABLET BY MOUTH TWICE DAILY, Disp: 180 tablet, Rfl: 0    fluticasone (FLONASE) 50 mcg/actuation nasal spray, 2 sprays (100 mcg total) by Each Nare route once daily., Disp: 1 Bottle, Rfl: 11    FOLIC ACID/MULTIVIT-MIN/LUTEIN (CENTRUM SILVER ORAL), Take 1 tablet by mouth once daily., Disp: , Rfl:     FOSAMAX PLUS D 70 mg- 2,800 unit per tablet, TK 1 T PO Q 7 DAYS, Disp: , Rfl: 5    furosemide (LASIX) 40 MG tablet, TAKE 1 TABLET BY MOUTH DAILY AS NEEDED, Disp: 90 tablet, Rfl: 3    HYDROcodone-acetaminophen (NORCO) 7.5-325 mg per tablet, Take 1 tablet by mouth every 6 (six) hours as needed for Pain., Disp: 90 tablet, Rfl: 0    imiquimod (ALDARA) 5 % cream, APPLY TO THE AFFECTED AREA 5 TIMES A WEEK FOR 2 TO 4 WEEKS AS TOLORATED, Disp: 24 packet, Rfl: 0    L-METHYL-B6-B12 3-35-2 mg Tab, TAKE 1 TABLET BY MOUTH TWICE DAILY, Disp: 180 tablet, Rfl: 3    lisinopril (PRINIVIL,ZESTRIL) 40 MG tablet, TAKE 1 TABLET BY MOUTH EVERY EVENING, Disp: 90 tablet, Rfl: 3    MAGOX 400 mg tablet, TAKE 1 TABLET BY MOUTH EVERY DAY, Disp: 90 tablet, Rfl: 3    mecobal-levomefolat Ca-B6 phos (L-METHYL-B6-B12) 3-35-2 mg Tab, Take 1 tablet by mouth 2 (two) times daily., Disp: 180 tablet, Rfl: 3    memantine (NAMENDA) 5 MG Tab, TAKE 1 TABLET(5 MG) BY MOUTH TWICE DAILY, Disp: 60 tablet, Rfl: 3    mirabegron (MYRBETRIQ) 50 mg Tb24, Take 1 tablet (50 mg total) by mouth once daily., Disp: 30 tablet, Rfl: 11    nitroGLYCERIN 0.4 MG/DOSE TL SPRY  (NITROLINGUAL) 400 mcg/spray spray, PLACE 1 SPRAY UNDER THE TONGUE EVERY 5 MINUTES AS NEEDED FOR CHEST PAIN, Disp: 4.9 g, Rfl: 3    omeprazole (PRILOSEC) 20 MG capsule, TAKE 1 CAPSULE BY MOUTH DAILY, Disp: 90 capsule, Rfl: 3    predniSONE (DELTASONE) 20 MG tablet, Take 1 tablet (20 mg total) by mouth 2 (two) times daily., Disp: 10 tablet, Rfl: 2    ranitidine (ZANTAC) 150 MG tablet, TAKE 1 TABLET(150 MG) BY MOUTH TWICE DAILY, Disp: 180 tablet, Rfl: 3    triamcinolone acetonide 0.1% (KENALOG) 0.1 % cream, AAA bid, Disp: 60 g, Rfl: 3    vit C-vit E-lutein-min-om-3 (OCUVITE) 882-92-4-150 mg-unit-mg-mg Cap, Take 1 capsule by mouth once daily., Disp: , Rfl:     warfarin (COUMADIN) 5 MG tablet, Take 1 tablet (5 mg total) by mouth Daily., Disp: 90 tablet, Rfl: 3    calcium carbonate (TUMS ULTRA) 400 mg calcium (1,000 mg) Chew, Take 1 tablet (400 mg total) by mouth once daily., Disp: 30 each, Rfl: 11    hydrocortisone 2.5 % cream, Apply topically 2 (two) times daily. for 10 days, Disp: 1 Tube, Rfl: 2    PMHx/PSHx Updates:  See patient's last visit with me on 10/3/2018  See H&P on 4/9/2014        Pathology:  Cancer Staging  No matching staging information was found for the patient.          Objective:     Vitals:  Blood pressure 131/68, pulse (!) 57, temperature 98.2 °F (36.8 °C), resp. rate 20, weight 131.9 kg (290 lb 11.2 oz).    Physical Examination:   GEN: no apparent distress, comfortable; AAOx3; overweight  HEAD: atraumatic and normocephalic  EYES: no pallor, no icterus, PERRLA  ENT: OMM, no pharyngeal erythema, external ears WNL; no nasal discharge; no thrush  NECK: no masses, thyroid normal, trachea midline, no LAD/LN's, supple  CV: RRR with no murmur; normal pulse; normal S1 and S2; no pedal edema  CHEST: Normal respiratory effort; CTAB; normal breath sounds; no wheeze or crackles  ABDOM: nontender and nondistended; soft; normal bowel sounds; no rebound/guarding; overweight  MUSC/Skeletal: ROM normal; no  crepitus; joints normal; no deformities or arthropathy; uses cane to walk  EXTREM: erythematous changes on bilateral lower extremities; right foot right; left foot is better  SKIN: no rashes, petechiae or subcutaneous nodules; skin cancer on face for which he has been on cream (improved); he is some subcutaneous bleeds under skin on bilat arms  : no mcdaniels  NEURO: grossly intact; motor/sensory WNL; AAOx3; no tremors  PSYCH: normal mood, affect and behavior  LYMPH: normal cervical, supraclavicular, axillary and groin LN's            Labs:     3/29/2019    Lab Results   Component Value Date    WBC 7.00 03/29/2019    HGB 11.1 (L) 03/29/2019    HCT 33.8 (L) 03/29/2019    MCV 99 (H) 03/29/2019     03/29/2019       3/25/2019  BMP  Lab Results   Component Value Date     03/29/2019    K 4.7 03/29/2019     03/29/2019    CO2 25 03/29/2019    BUN 26 (H) 03/29/2019    CREATININE 1.4 03/29/2019    CALCIUM 9.4 03/29/2019    ANIONGAP 7 (L) 03/29/2019    ESTGFRAFRICA 58.0 (A) 03/29/2019    EGFRNONAA 50.2 (A) 03/29/2019 2/11/2019  Lab Results   Component Value Date    ALT 19 02/11/2019    AST 24 02/11/2019    ALKPHOS 99 02/11/2019    BILITOT 0.4 02/11/2019     3/21/2019  Lab Results   Component Value Date    INR 2.3 (H) 04/03/2019    INR 1.5 (H) 03/21/2019    INR 2.6 (H) 03/08/2019         Radiology/Diagnostic Studies:    No results found.    I have reviewed all available lab results and radiology reports.    Assessment/Plan:   (1) 73 y.o. male with diagnosis of chronic clot disorder with underlying MTHFR issues  - he was previously under the care of Dr Krunal Rodriguez with hematology at [facility]  - he has been on coumadin therapy per direction of Dr Tate with cardiology  - he has been having it adjusted by cardiology  - latest INR was 2.3      (2) CAD - followed by Dr Tate    (3) Abdominal hematoma in past     (4) CRI - followed by Nephrology (Amy?)    (5) Chronic multifactorial anemia with underlying anemia of  chronic disorders and chronic renal disease; chronic GIB  - latest hgb at 11.1 and a little better     (6) Right foot diabetic ulcer/ prior left toe issues - followed by Dr Torrez - improved per patient    (7) Urology following for prostate - Dr Moss        1. Prostate cancer     2. Hypercoagulable state, Prothrombin mutation     3. Long term (current) use of anticoagulants     4. Anemia, chronic disease     5. MTHFR mutation (methylenetetrahydrofolate reductase)     6. H/O bariatric surgery, 2008           PLAN:  1. Check labs every 3 months  2. F/u with PCP, card, and neph  3. Continue coumadin per Dr Tate's direction  4. F/u with Dr Torrez  5. RTC in 6 months    Fax note to Familia Ramirez Jr., *, Bebeto; Shen Torrez    Discussion:     I have explained all of the above in detail and the patient understands all of the current recommendation(s). I have answered all of their questions to the best of my ability and to their complete satisfaction.   The patient is to continue with the current management plan.            Electronically signed by Kai Ortiz MD

## 2019-04-03 ENCOUNTER — OFFICE VISIT (OUTPATIENT)
Dept: HEMATOLOGY/ONCOLOGY | Facility: CLINIC | Age: 74
End: 2019-04-03
Payer: MEDICARE

## 2019-04-03 ENCOUNTER — PATIENT MESSAGE (OUTPATIENT)
Dept: FAMILY MEDICINE | Facility: CLINIC | Age: 74
End: 2019-04-03

## 2019-04-03 VITALS
BODY MASS INDEX: 42.93 KG/M2 | RESPIRATION RATE: 20 BRPM | HEART RATE: 57 BPM | SYSTOLIC BLOOD PRESSURE: 131 MMHG | DIASTOLIC BLOOD PRESSURE: 68 MMHG | TEMPERATURE: 98 F | WEIGHT: 290.69 LBS

## 2019-04-03 DIAGNOSIS — Z15.89 MTHFR MUTATION (METHYLENETETRAHYDROFOLATE REDUCTASE): ICD-10-CM

## 2019-04-03 DIAGNOSIS — Z98.84 H/O BARIATRIC SURGERY: ICD-10-CM

## 2019-04-03 DIAGNOSIS — C61 PROSTATE CANCER: Primary | ICD-10-CM

## 2019-04-03 DIAGNOSIS — Z79.01 LONG TERM (CURRENT) USE OF ANTICOAGULANTS: ICD-10-CM

## 2019-04-03 DIAGNOSIS — D68.59 HYPERCOAGULABLE STATE: ICD-10-CM

## 2019-04-03 DIAGNOSIS — D63.8 ANEMIA, CHRONIC DISEASE: ICD-10-CM

## 2019-04-03 PROCEDURE — 3078F PR MOST RECENT DIASTOLIC BLOOD PRESSURE < 80 MM HG: ICD-10-PCS | Mod: ,,, | Performed by: INTERNAL MEDICINE

## 2019-04-03 PROCEDURE — 99213 PR OFFICE/OUTPT VISIT, EST, LEVL III, 20-29 MIN: ICD-10-PCS | Mod: ,,, | Performed by: INTERNAL MEDICINE

## 2019-04-03 PROCEDURE — 3075F SYST BP GE 130 - 139MM HG: CPT | Mod: ,,, | Performed by: INTERNAL MEDICINE

## 2019-04-03 PROCEDURE — 1101F PR PT FALLS ASSESS DOC 0-1 FALLS W/OUT INJ PAST YR: ICD-10-PCS | Mod: ,,, | Performed by: INTERNAL MEDICINE

## 2019-04-03 PROCEDURE — 3075F PR MOST RECENT SYSTOLIC BLOOD PRESS GE 130-139MM HG: ICD-10-PCS | Mod: ,,, | Performed by: INTERNAL MEDICINE

## 2019-04-03 PROCEDURE — 3078F DIAST BP <80 MM HG: CPT | Mod: ,,, | Performed by: INTERNAL MEDICINE

## 2019-04-03 PROCEDURE — 99213 OFFICE O/P EST LOW 20 MIN: CPT | Mod: ,,, | Performed by: INTERNAL MEDICINE

## 2019-04-03 PROCEDURE — 1101F PT FALLS ASSESS-DOCD LE1/YR: CPT | Mod: ,,, | Performed by: INTERNAL MEDICINE

## 2019-04-03 NOTE — LETTER
April 3, 2019      Familia Ramirez Jr., MD  [address]  [place] [address]           [facility] - Hematology Oncology  [address]  [address]  [place] [address]  Phone: [phone]  Fax: [phone]          Patient: Richard Bustos   MR Number: [id number]   YOB: 1945   Date of Visit: 4/3/2019       Dear Dr. Familia Ramirez Jr.:    Thank you for referring Richard Bustos to me for evaluation. Attached you will find relevant portions of my assessment and plan of care.    If you have questions, please do not hesitate to call me. I look forward to following Richard Bustos along with you.    Sincerely,    Kai Ortiz MD    Enclosure  CC:  No Recipients    If you would like to receive this communication electronically, please contact [email or web address] or [phone] to request more information on [facility] access.    For providers and/or their staff who would like to refer a patient to [facility], please contact us through our one-stop-shop provider referral line, [facility], at [phone].    If you feel you have received this communication in error or would no longer like to receive these types of communications, please e-mail [email or web address]

## 2019-04-08 ENCOUNTER — TELEPHONE (OUTPATIENT)
Dept: HEMATOLOGY/ONCOLOGY | Facility: CLINIC | Age: 74
End: 2019-04-08

## 2019-04-08 ENCOUNTER — OFFICE VISIT (OUTPATIENT)
Dept: PODIATRY | Facility: CLINIC | Age: 74
End: 2019-04-08
Payer: MEDICARE

## 2019-04-08 VITALS — BODY MASS INDEX: 43.07 KG/M2 | WEIGHT: 290.81 LBS | HEIGHT: 69 IN

## 2019-04-08 DIAGNOSIS — E11.49 TYPE II DIABETES MELLITUS WITH NEUROLOGICAL MANIFESTATIONS: ICD-10-CM

## 2019-04-08 DIAGNOSIS — I73.9 PVD (PERIPHERAL VASCULAR DISEASE): ICD-10-CM

## 2019-04-08 DIAGNOSIS — L97.512 TOE ULCER, RIGHT, WITH FAT LAYER EXPOSED: Primary | ICD-10-CM

## 2019-04-08 PROCEDURE — 87070 CULTURE OTHR SPECIMN AEROBIC: CPT

## 2019-04-08 PROCEDURE — 1101F PR PT FALLS ASSESS DOC 0-1 FALLS W/OUT INJ PAST YR: ICD-10-PCS | Mod: CPTII,S$GLB,, | Performed by: PODIATRIST

## 2019-04-08 PROCEDURE — 99999 PR PBB SHADOW E&M-EST. PATIENT-LVL III: ICD-10-PCS | Mod: PBBFAC,,, | Performed by: PODIATRIST

## 2019-04-08 PROCEDURE — 1101F PT FALLS ASSESS-DOCD LE1/YR: CPT | Mod: CPTII,S$GLB,, | Performed by: PODIATRIST

## 2019-04-08 PROCEDURE — 99213 PR OFFICE/OUTPT VISIT, EST, LEVL III, 20-29 MIN: ICD-10-PCS | Mod: 25,S$GLB,, | Performed by: PODIATRIST

## 2019-04-08 PROCEDURE — 99213 OFFICE O/P EST LOW 20 MIN: CPT | Mod: 25,S$GLB,, | Performed by: PODIATRIST

## 2019-04-08 PROCEDURE — 99999 PR PBB SHADOW E&M-EST. PATIENT-LVL III: CPT | Mod: PBBFAC,,, | Performed by: PODIATRIST

## 2019-04-08 PROCEDURE — 3044F HG A1C LEVEL LT 7.0%: CPT | Mod: CPTII,S$GLB,, | Performed by: PODIATRIST

## 2019-04-08 PROCEDURE — 87075 CULTR BACTERIA EXCEPT BLOOD: CPT

## 2019-04-08 PROCEDURE — 3044F PR MOST RECENT HEMOGLOBIN A1C LEVEL <7.0%: ICD-10-PCS | Mod: CPTII,S$GLB,, | Performed by: PODIATRIST

## 2019-04-08 PROCEDURE — 87076 CULTURE ANAEROBE IDENT EACH: CPT | Mod: 59

## 2019-04-08 RX ORDER — DOXYCYCLINE 100 MG/1
100 TABLET ORAL 2 TIMES DAILY
Qty: 20 TABLET | Refills: 0 | Status: SHIPPED | OUTPATIENT
Start: 2019-04-08 | End: 2019-06-18

## 2019-04-08 NOTE — PROGRESS NOTES
Subjective:      Patient ID: Richard Bustos is a 73 y.o. male.    Chief Complaint: Follow-up (right foot ulcer)    Richard is a 73 y.o. male who presents to the clinic for evaluation and treatment of high risk feet. Richard has a past medical history of Anemia, Anemia of other chronic disease (9/13/2017), Anemia, chronic renal failure (9/13/2017), Anemia, unspecified (9/13/2017), Anticoagulant long-term use, Aorta aneurysm, Arthritis, Atrial fibrillation, CAD (coronary artery disease), CHF (congestive heart failure), Chronic kidney disease, Clotting disorder (9/13/2017), Colon polyp, Diabetes mellitus, Diabetes mellitus type II, Diverticulosis, Elevated PSA, Encounter for blood transfusion, Former smoker, GERD (gastroesophageal reflux disease), Gout, colonic polyps, Hypercoagulable state, Hyperlipidemia, Hypertension, MI, old (2010), Myocardial infarction, Osteomyelitis of ankle or foot (3/9/2017), Prostate cancer (06/2016), Sleep apnea, Squamous cell carcinoma (01/23/2018), and Venous stasis. The patient's chief complaint is diabetic foot ulcer, toes 2,3 right.  Unknown onset, discovered this morning changing dressing no change since.  aggravated by increased weight bearing, shoe gear, pressure.  Debridement, dressings, sx shoe ambulation helping   xrays negative osteolysis.  Finished clindamycin yesterday.  Redness, swelling persists 2nd toe only right foot.      PCP: Familia Ramirez Jr, MD    Date Last Seen by PCP:   Chief Complaint   Patient presents with    Follow-up     right foot ulcer         Current shoe gear:  Affected Foot: sx shoe right     Unaffected Foot: Rx diabetic extra depth shoes and custom accommodative insoles    History of Trauma: negative  Sign of Infection: none systemically - local findings below.    Hemoglobin A1C   Date Value Ref Range Status   02/11/2019 6.2 (H) 4.0 - 5.6 % Final     Comment:     ADA Screening Guidelines:  5.7-6.4%  Consistent with prediabetes  >or=6.5%  Consistent with  diabetes  High levels of fetal hemoglobin interfere with the HbA1C  assay. Heterozygous hemoglobin variants (HbS, HgC, etc)do  not significantly interfere with this assay.   However, presence of multiple variants may affect accuracy.     10/22/2018 5.9 (H) 4.0 - 5.6 % Final     Comment:     ADA Screening Guidelines:  5.7-6.4%  Consistent with prediabetes  >or=6.5%  Consistent with diabetes  High levels of fetal hemoglobin interfere with the HbA1C  assay. Heterozygous hemoglobin variants (HbS, HgC, etc)do  not significantly interfere with this assay.   However, presence of multiple variants may affect accuracy.     10/07/2018 6.0 (H) 4.0 - 5.6 % Final     Comment:     ADA Screening Guidelines:  5.7-6.4%  Consistent with prediabetes  >or=6.5%  Consistent with diabetes  High levels of fetal hemoglobin interfere with the HbA1C  assay. Heterozygous hemoglobin variants (HbS, HgC, etc)do  not significantly interfere with this assay.   However, presence of multiple variants may affect accuracy.     06/17/2013 6.8 (H) 4.8 - 5.6 % Final     Comment:              Increased risk for diabetes: 5.7 - 6.4           Diabetes: >6.4           Glycemic control for adults with diabetes: <7.0  **In order to standardize %HbA1c results worldwide, as of October 11, 2010,  the %HbA1c is being calculated using the master equation recommended in the  consensus statement adopted by the ADA (American Diabetes Assoc), EASD  (European Assoc for the Study of Diabetes), IFCC (International Federation  of Clinical Chemistry and Laboratory Medicine) and IDF (International  Diabetes Federation). Result units: %HgbA1c (DCCT/NGSP).  In common with other methods, Hb A1C values may not accurately reflect mean  blood glucose in patients with hemoglobin variants (HgbF, HgbS and HgbC).  Any cause of shortened erythrocyte survival will reduce exposure of  erythrocytes to glucose with a consequent decrease in HbA1c (%) values, even  though the time-averaged  blood glucose level may be elevated. Causes of  shortened erythrocyte lifetime might be hemolytic anemia or other hemolytic  diseases, homozygous sickle cell trait, pregnancy, recent significant or  chronic blood loss, etc. Caution should be used when interpreting the HbA1c  results from patients with these conditions.       Review of Systems   Constitution: Negative for chills, diaphoresis, fever, malaise/fatigue and night sweats.   Cardiovascular: Positive for leg swelling. Negative for claudication, cyanosis and syncope.   Skin: Positive for poor wound healing. Negative for color change, dry skin, nail changes, rash, suspicious lesions and unusual hair distribution.   Musculoskeletal: Negative for falls, joint pain, joint swelling, muscle cramps, muscle weakness and stiffness.   Gastrointestinal: Negative for constipation, diarrhea, nausea and vomiting.   Neurological: Positive for numbness, paresthesias and sensory change. Negative for brief paralysis, disturbances in coordination, focal weakness and tremors.           Objective:      Physical Exam   Constitutional: He appears well-developed and well-nourished. He is cooperative. No distress.   Cardiovascular:   Pulses:       Dorsalis pedis pulses are 1+ on the right side, and 1+ on the left side.        Posterior tibial pulses are 1+ on the right side, and 1+ on the left side.   Toes warm, pink, cap fill <5 seconds.    <2+ pitting edema both legs.   Musculoskeletal:   Normal angle, base, station of gait. All ten toes without clubbing, cyanosis, or signs of ischemia.  No pain to palpation bilateral lower extremities.  Range of motion, stability, muscle strength, and muscle tone normal bilateral feet and legs.    Lymphadenopathy:   Negative lymphadenopathy bilateral popliteal fossa and tarsal tunnel.     Neurological: He has normal strength. He displays no atrophy and no tremor. A sensory deficit is present. He exhibits normal muscle tone.   Reflex Scores:        Patellar reflexes are 1+ on the right side and 1+ on the left side.       Achilles reflexes are 1+ on the right side and 1+ on the left side.  Diminished/loss of protective sensation all toes bilateral to 10 gram monofilament.     Skin: Skin is warm and dry. No abrasion, no bruising, no burn, no ecchymosis, no laceration, no lesion and no rash noted. He is not diaphoretic. No erythema. No pallor.       Wound:plantar 2nd pipj right    Size:    Pre:[id number]    Base:  Granular, pink with moderate serous/serosanaguinous drainage only.  No pus, tracking, fluctuance, malodor, or cardinal signs infection.  Tendon no longer visibly exposed in base.    Borders:  Hyperkeratotic, debriding to flat, pink, blanchable skin edges without undermining.      Wound:  Dorsal 3rd toe pipj right    Size:    Pre:[id number]    Base:  Granular, pink with moderate serous/serosanaguinous drainage only.  No pus, tracking, fluctuance, malodor, or cardinal signs infection.  Tendon exposed in base.    Borders:  Hyperkeratotic, debriding to flat, pink, blanchable skin edges without undermining.                 Assessment:       Encounter Diagnoses   Name Primary?    Toe ulcer, right, with fat layer exposed Yes    Type II diabetes mellitus with neurological manifestations     PVD (peripheral vascular disease)          Plan:       Richard was seen today for follow-up.    Diagnoses and all orders for this visit:    Toe ulcer, right, with fat layer exposed  -     MRI Foot (Forefoot) Right Without Contrast; Future  -     Aerobic culture  -     Culture, Anaerobic    Type II diabetes mellitus with neurological manifestations  -     MRI Foot (Forefoot) Right Without Contrast; Future  -     Aerobic culture  -     Culture, Anaerobic    PVD (peripheral vascular disease)  -     MRI Foot (Forefoot) Right Without Contrast; Future  -     Aerobic culture  -     Culture, Anaerobic    Other orders  -     doxycycline monohydrate 100 mg Tab; Take 1 tablet (100 mg total)  by mouth 2 (two) times daily.      I counseled the patient on his conditions, their implications and medical management.    New cultures, MRI right forefoot, Rx doxycycline.    Dressed both wounds with nicole, wound foam, kerlix.  Change same every other day.    Ambulate minimally in sx shoe bilateral.    Adequate vitamin supplementation, protein intake, and hydration - discussed with patient.            Follow up in about 1 week (around 4/15/2019).

## 2019-04-08 NOTE — TELEPHONE ENCOUNTER
Called left message to start taking vitamin d  Over the counter        ----- Message from Kai Ortiz MD sent at 4/6/2019 11:38 AM CDT -----  Make sure he is on vitamin d

## 2019-04-11 LAB — BACTERIA SPEC AEROBE CULT: NORMAL

## 2019-04-12 DIAGNOSIS — M54.5 CHRONIC LOW BACK PAIN, UNSPECIFIED BACK PAIN LATERALITY, WITH SCIATICA PRESENCE UNSPECIFIED: ICD-10-CM

## 2019-04-12 DIAGNOSIS — G89.29 CHRONIC LOW BACK PAIN, UNSPECIFIED BACK PAIN LATERALITY, WITH SCIATICA PRESENCE UNSPECIFIED: ICD-10-CM

## 2019-04-12 DIAGNOSIS — F41.9 ANXIETY: ICD-10-CM

## 2019-04-12 DIAGNOSIS — D50.9 IRON DEFICIENCY ANEMIA: ICD-10-CM

## 2019-04-13 RX ORDER — ALPRAZOLAM 1 MG/1
TABLET ORAL
Qty: 30 TABLET | Refills: 0 | Status: SHIPPED | OUTPATIENT
Start: 2019-04-13 | End: 2019-06-10 | Stop reason: SDUPTHER

## 2019-04-13 RX ORDER — FERROUS SULFATE 325(65) MG
1 TABLET ORAL 2 TIMES DAILY
Qty: 180 TABLET | Refills: 0 | Status: SHIPPED | OUTPATIENT
Start: 2019-04-13 | End: 2019-07-17 | Stop reason: SDUPTHER

## 2019-04-13 RX ORDER — HYDROCODONE BITARTRATE AND ACETAMINOPHEN 7.5; 325 MG/1; MG/1
1 TABLET ORAL EVERY 6 HOURS PRN
Qty: 90 TABLET | Refills: 0 | Status: SHIPPED | OUTPATIENT
Start: 2019-04-13 | End: 2019-06-10 | Stop reason: SDUPTHER

## 2019-04-15 ENCOUNTER — OFFICE VISIT (OUTPATIENT)
Dept: PODIATRY | Facility: CLINIC | Age: 74
End: 2019-04-15
Payer: MEDICARE

## 2019-04-15 VITALS — WEIGHT: 290.81 LBS | BODY MASS INDEX: 43.07 KG/M2 | HEIGHT: 69 IN

## 2019-04-15 DIAGNOSIS — L97.512 SKIN ULCER OF TOE OF RIGHT FOOT WITH FAT LAYER EXPOSED: Primary | ICD-10-CM

## 2019-04-15 DIAGNOSIS — E11.49 TYPE II DIABETES MELLITUS WITH NEUROLOGICAL MANIFESTATIONS: ICD-10-CM

## 2019-04-15 DIAGNOSIS — I73.9 PVD (PERIPHERAL VASCULAR DISEASE): ICD-10-CM

## 2019-04-15 DIAGNOSIS — L97.512 SKIN ULCER OF RIGHT FOOT WITH FAT LAYER EXPOSED: ICD-10-CM

## 2019-04-15 LAB — BACTERIA SPEC ANAEROBE CULT: NORMAL

## 2019-04-15 PROCEDURE — 99999 PR PBB SHADOW E&M-EST. PATIENT-LVL III: ICD-10-PCS | Mod: PBBFAC,,, | Performed by: PODIATRIST

## 2019-04-15 PROCEDURE — 11042 PR DEBRIDEMENT, SKIN, SUB-Q TISSUE,=<20 SQ CM: ICD-10-PCS | Mod: S$GLB,,, | Performed by: PODIATRIST

## 2019-04-15 PROCEDURE — 99499 NO LOS: ICD-10-PCS | Mod: S$GLB,,, | Performed by: PODIATRIST

## 2019-04-15 PROCEDURE — 99499 UNLISTED E&M SERVICE: CPT | Mod: S$GLB,,, | Performed by: PODIATRIST

## 2019-04-15 PROCEDURE — 11042 DBRDMT SUBQ TIS 1ST 20SQCM/<: CPT | Mod: S$GLB,,, | Performed by: PODIATRIST

## 2019-04-15 PROCEDURE — 99999 PR PBB SHADOW E&M-EST. PATIENT-LVL III: CPT | Mod: PBBFAC,,, | Performed by: PODIATRIST

## 2019-04-15 NOTE — PROCEDURES
"Wound Debridement  Date/Time: 4/15/2019 10:05 AM  Performed by: Iván Torrez DPM  Authorized by: Iván Torrez DPM     Time out: Immediately prior to procedure a "time out" was called to verify the correct patient, procedure, equipment, support staff and site/side marked as required.    Consent Done?:  Yes (Verbal)  Local anesthesia used?: No      Wound Details:    Location:  Right foot    Location:  Right 2nd Toe    Type of Debridement:  Excisional       Length (cm):  1       Area (sq cm):  0.3       Width (cm):  0.3       Percent Debrided (%):  100       Depth (cm):  0.3       Total Area Debrided (sq cm):  0.3    Depth of debridement:  Subcutaneous tissue    Tissue debrided:  Dermis, Epidermis and Subcutaneous    Devitalized tissue debrided:  Biofilm, Callus and Necrotic/Eschar    Instruments:  Curette    Additional wounds:  1    2nd Wound Details:     Location:  Right foot    Location:  Right 5th Metatarsal Head    Location:  Right 5th Metatarsal Head    Type of Debridement:  Excisional       Length (cm):  0.8       Area (sq cm):  0.64       Width (cm):  0.8       Percent Debrided (%):  100       Depth (cm):  0.2       Total Area Debrided (sq cm):  0.64    Depth of debridement:  Subcutaneous tissue    Tissue debrided:  Epidermis, Dermis and Subcutaneous    Devitalized tissue debrided:  Biofilm, Callus, Slough and Necrotic/Eschar    Instruments:  Blade    Bleeding:  Minimal  Hemostasis Achieved: Yes    Method Used:  Pressure  Patient tolerance:  Patient tolerated the procedure well with no immediate complications      "

## 2019-04-15 NOTE — PROGRESS NOTES
Subjective:      Patient ID: Richard Bustos is a 73 y.o. male.    Chief Complaint: Follow-up (right foot wound)    Richard is a 73 y.o. male who presents to the clinic for evaluation and treatment of high risk feet. Richard has a past medical history of Anemia, Anemia of other chronic disease (9/13/2017), Anemia, chronic renal failure (9/13/2017), Anemia, unspecified (9/13/2017), Anticoagulant long-term use, Aorta aneurysm, Arthritis, Atrial fibrillation, CAD (coronary artery disease), CHF (congestive heart failure), Chronic kidney disease, Clotting disorder (9/13/2017), Colon polyp, Diabetes mellitus, Diabetes mellitus type II, Diverticulosis, Elevated PSA, Encounter for blood transfusion, Former smoker, GERD (gastroesophageal reflux disease), Gout, colonic polyps, Hypercoagulable state, Hyperlipidemia, Hypertension, MI, old (2010), Myocardial infarction, Osteomyelitis of ankle or foot (3/9/2017), Prostate cancer (06/2016), Sleep apnea, Squamous cell carcinoma (01/23/2018), and Venous stasis. The patient's chief complaint is diabetic foot ulcer, toes 2,3 right.  Unknown onset, discovered this morning changing dressing no change since.  aggravated by increased weight bearing, shoe gear, pressure.  Debridement, dressings, sx shoe ambulation helping   xrays negative osteolysis.  Finished clindamycin yesterday.  Redness, swelling improved 2nd toe only right foot.  Taking doxycycline.  MRI negative abscess and negative osteomyelitis.    PCP: Familia Ramirez Jr, MD    Date Last Seen by PCP:   Chief Complaint   Patient presents with    Follow-up     right foot wound         Current shoe gear:  Affected Foot: sx shoe right     Unaffected Foot: Rx diabetic extra depth shoes and custom accommodative insoles    History of Trauma: negative  Sign of Infection: none systemically - local findings below.    Hemoglobin A1C   Date Value Ref Range Status   02/11/2019 6.2 (H) 4.0 - 5.6 % Final     Comment:     ADA Screening  Guidelines:  5.7-6.4%  Consistent with prediabetes  >or=6.5%  Consistent with diabetes  High levels of fetal hemoglobin interfere with the HbA1C  assay. Heterozygous hemoglobin variants (HbS, HgC, etc)do  not significantly interfere with this assay.   However, presence of multiple variants may affect accuracy.     10/22/2018 5.9 (H) 4.0 - 5.6 % Final     Comment:     ADA Screening Guidelines:  5.7-6.4%  Consistent with prediabetes  >or=6.5%  Consistent with diabetes  High levels of fetal hemoglobin interfere with the HbA1C  assay. Heterozygous hemoglobin variants (HbS, HgC, etc)do  not significantly interfere with this assay.   However, presence of multiple variants may affect accuracy.     10/07/2018 6.0 (H) 4.0 - 5.6 % Final     Comment:     ADA Screening Guidelines:  5.7-6.4%  Consistent with prediabetes  >or=6.5%  Consistent with diabetes  High levels of fetal hemoglobin interfere with the HbA1C  assay. Heterozygous hemoglobin variants (HbS, HgC, etc)do  not significantly interfere with this assay.   However, presence of multiple variants may affect accuracy.     06/17/2013 6.8 (H) 4.8 - 5.6 % Final     Comment:              Increased risk for diabetes: 5.7 - 6.4           Diabetes: >6.4           Glycemic control for adults with diabetes: <7.0  **In order to standardize %HbA1c results worldwide, as of October 11, 2010,  the %HbA1c is being calculated using the master equation recommended in the  consensus statement adopted by the ADA (American Diabetes Assoc), EASD  (European Assoc for the Study of Diabetes), IFCC (International Federation  of Clinical Chemistry and Laboratory Medicine) and IDF (International  Diabetes Federation). Result units: %HgbA1c (DCCT/NGSP).  In common with other methods, Hb A1C values may not accurately reflect mean  blood glucose in patients with hemoglobin variants (HgbF, HgbS and HgbC).  Any cause of shortened erythrocyte survival will reduce exposure of  erythrocytes to glucose  with a consequent decrease in HbA1c (%) values, even  though the time-averaged blood glucose level may be elevated. Causes of  shortened erythrocyte lifetime might be hemolytic anemia or other hemolytic  diseases, homozygous sickle cell trait, pregnancy, recent significant or  chronic blood loss, etc. Caution should be used when interpreting the HbA1c  results from patients with these conditions.       Review of Systems   Constitution: Negative for chills, diaphoresis, fever, malaise/fatigue and night sweats.   Cardiovascular: Positive for leg swelling. Negative for claudication, cyanosis and syncope.   Skin: Positive for poor wound healing. Negative for color change, dry skin, nail changes, rash, suspicious lesions and unusual hair distribution.   Musculoskeletal: Negative for falls, joint pain, joint swelling, muscle cramps, muscle weakness and stiffness.   Gastrointestinal: Negative for constipation, diarrhea, nausea and vomiting.   Neurological: Positive for numbness, paresthesias and sensory change. Negative for brief paralysis, disturbances in coordination, focal weakness and tremors.           Objective:      Physical Exam   Constitutional: He appears well-developed and well-nourished. He is cooperative. No distress.   Cardiovascular:   Pulses:       Dorsalis pedis pulses are 1+ on the right side, and 1+ on the left side.        Posterior tibial pulses are 1+ on the right side, and 1+ on the left side.   Toes warm, pink, cap fill <5 seconds.    <2+ pitting edema both legs.   Musculoskeletal:   Normal angle, base, station of gait. All ten toes without clubbing, cyanosis, or signs of ischemia.  No pain to palpation bilateral lower extremities.  Range of motion, stability, muscle strength, and muscle tone normal bilateral feet and legs.    Lymphadenopathy:   Negative lymphadenopathy bilateral popliteal fossa and tarsal tunnel.     Neurological: He has normal strength. He displays no atrophy and no tremor. A  sensory deficit is present. He exhibits normal muscle tone.   Reflex Scores:       Patellar reflexes are 1+ on the right side and 1+ on the left side.       Achilles reflexes are 1+ on the right side and 1+ on the left side.  Diminished/loss of protective sensation all toes bilateral to 10 gram monofilament.     Skin: Skin is warm and dry. No abrasion, no bruising, no burn, no ecchymosis, no laceration, no lesion and no rash noted. He is not diaphoretic. No erythema. No pallor.       Wound:plantar 2nd pipj right    Size:    Pre:[id number]  Post:  [id number]  Base:  Granular, pink with moderate serous/serosanaguinous drainage only.  No pus, tracking, fluctuance, malodor, or cardinal signs infection.  Tendon no longer visibly exposed in base.    Borders:  Hyperkeratotic, debriding to flat, pink, blanchable skin edges without undermining.    Wound:  Lateral right 5th mtpj    Size:    Pre:[id number]  Post: [id number]    Base:  Granular, pink with moderate serous/serosanaguinous drainage only.  No pus, tracking, fluctuance, malodor, or cardinal signs infection.    Borders:  Hyperkeratotic, debriding to flat, pink, blanchable skin edges without undermining.        Wound dorsal 3rd toe right closed today, epithelialized  without ulceration, drainage, pus, tracking, fluctuance, malodor, or cardinal signs infection.                     Assessment:       Encounter Diagnoses   Name Primary?    Skin ulcer of toe of right foot with fat layer exposed Yes    Skin ulcer of right foot with fat layer exposed     Type II diabetes mellitus with neurological manifestations     PVD (peripheral vascular disease)          Plan:       Richard was seen today for follow-up.    Diagnoses and all orders for this visit:    Skin ulcer of toe of right foot with fat layer exposed    Skin ulcer of right foot with fat layer exposed    Type II diabetes mellitus with neurological manifestations    PVD (peripheral vascular disease)      I counseled the patient on  his conditions, their implications and medical management.    Debrided wounds right 2nd toe and 5th mtpj.    Finish antibiotic.    Dressed both wounds with nicole, wound foam, kerlix.  Change same every other day.    Ambulate minimally in sx shoe bilateral.    Adequate vitamin supplementation, protein intake, and hydration - discussed with patient.            Follow up in about 1 week (around 4/22/2019).

## 2019-04-24 ENCOUNTER — LAB VISIT (OUTPATIENT)
Dept: LAB | Facility: HOSPITAL | Age: 74
End: 2019-04-24
Attending: INTERNAL MEDICINE
Payer: MEDICARE

## 2019-04-24 ENCOUNTER — OFFICE VISIT (OUTPATIENT)
Dept: PODIATRY | Facility: CLINIC | Age: 74
End: 2019-04-24
Payer: MEDICARE

## 2019-04-24 VITALS — BODY MASS INDEX: 43.07 KG/M2 | HEIGHT: 69 IN | WEIGHT: 290.81 LBS

## 2019-04-24 DIAGNOSIS — D68.59 HYPERCOAGULABLE STATE: ICD-10-CM

## 2019-04-24 DIAGNOSIS — Z79.01 LONG TERM (CURRENT) USE OF ANTICOAGULANTS: ICD-10-CM

## 2019-04-24 DIAGNOSIS — L97.512 SKIN ULCER OF TOE OF RIGHT FOOT WITH FAT LAYER EXPOSED: Primary | ICD-10-CM

## 2019-04-24 DIAGNOSIS — I73.9 PVD (PERIPHERAL VASCULAR DISEASE): ICD-10-CM

## 2019-04-24 DIAGNOSIS — C61 PROSTATE CANCER: ICD-10-CM

## 2019-04-24 DIAGNOSIS — E11.49 TYPE II DIABETES MELLITUS WITH NEUROLOGICAL MANIFESTATIONS: ICD-10-CM

## 2019-04-24 LAB
COMPLEXED PSA SERPL-MCNC: <0.01 NG/ML (ref 0–4)
INR PPP: 1.5 (ref 0.8–1.2)
PROTHROMBIN TIME: 15.2 SEC (ref 9–12.5)

## 2019-04-24 PROCEDURE — 97597 PR DEBRIDEMENT OPEN WOUND 20 SQ CM<: ICD-10-PCS | Mod: S$GLB,,, | Performed by: PODIATRIST

## 2019-04-24 PROCEDURE — 1101F PT FALLS ASSESS-DOCD LE1/YR: CPT | Mod: CPTII,S$GLB,, | Performed by: PODIATRIST

## 2019-04-24 PROCEDURE — 97597 DBRDMT OPN WND 1ST 20 CM/<: CPT | Mod: S$GLB,,, | Performed by: PODIATRIST

## 2019-04-24 PROCEDURE — 99213 PR OFFICE/OUTPT VISIT, EST, LEVL III, 20-29 MIN: ICD-10-PCS | Mod: 25,S$GLB,, | Performed by: PODIATRIST

## 2019-04-24 PROCEDURE — 85610 PROTHROMBIN TIME: CPT

## 2019-04-24 PROCEDURE — 3044F HG A1C LEVEL LT 7.0%: CPT | Mod: CPTII,S$GLB,, | Performed by: PODIATRIST

## 2019-04-24 PROCEDURE — 99999 PR PBB SHADOW E&M-EST. PATIENT-LVL III: ICD-10-PCS | Mod: PBBFAC,,, | Performed by: PODIATRIST

## 2019-04-24 PROCEDURE — 1101F PR PT FALLS ASSESS DOC 0-1 FALLS W/OUT INJ PAST YR: ICD-10-PCS | Mod: CPTII,S$GLB,, | Performed by: PODIATRIST

## 2019-04-24 PROCEDURE — 3044F PR MOST RECENT HEMOGLOBIN A1C LEVEL <7.0%: ICD-10-PCS | Mod: CPTII,S$GLB,, | Performed by: PODIATRIST

## 2019-04-24 PROCEDURE — 99213 OFFICE O/P EST LOW 20 MIN: CPT | Mod: 25,S$GLB,, | Performed by: PODIATRIST

## 2019-04-24 PROCEDURE — 99999 PR PBB SHADOW E&M-EST. PATIENT-LVL III: CPT | Mod: PBBFAC,,, | Performed by: PODIATRIST

## 2019-04-24 PROCEDURE — 84153 ASSAY OF PSA TOTAL: CPT

## 2019-04-24 PROCEDURE — 36415 COLL VENOUS BLD VENIPUNCTURE: CPT | Mod: PO

## 2019-04-25 ENCOUNTER — TELEPHONE (OUTPATIENT)
Dept: HEMATOLOGY/ONCOLOGY | Facility: CLINIC | Age: 74
End: 2019-04-25

## 2019-04-25 NOTE — TELEPHONE ENCOUNTER
Someone else is watching coumadin       ----- Message from Kai Ortiz MD sent at 4/25/2019  8:15 AM CDT -----  Do we have him on coumadin or does some one else?

## 2019-05-06 NOTE — PROGRESS NOTES
Subjective:      Patient ID: Richard Bustos is a 73 y.o. male.    Chief Complaint: Follow-up (right foot wound)    Richard is a 73 y.o. male who presents to the clinic for evaluation and treatment of high risk feet. Richard has a past medical history of Anemia, Anemia of other chronic disease (9/13/2017), Anemia, chronic renal failure (9/13/2017), Anemia, unspecified (9/13/2017), Anticoagulant long-term use, Aorta aneurysm, Arthritis, Atrial fibrillation, CAD (coronary artery disease), CHF (congestive heart failure), Chronic kidney disease, Clotting disorder (9/13/2017), Colon polyp, Diabetes mellitus, Diabetes mellitus type II, Diverticulosis, Elevated PSA, Encounter for blood transfusion, Former smoker, GERD (gastroesophageal reflux disease), Gout, colonic polyps, Hypercoagulable state, Hyperlipidemia, Hypertension, MI, old (2010), Myocardial infarction, Osteomyelitis of ankle or foot (3/9/2017), Prostate cancer (06/2016), Sleep apnea, Squamous cell carcinoma (01/23/2018), and Venous stasis. The patient's chief complaint is diabetic foot ulcer near right 5th toe.  Unknown onset, discovered this morning changing dressing no change since.  aggravated by increased weight bearing, shoe gear, pressure.  Debridement, dressings, sx shoe ambulation helping   xrays negative osteolysis.  Finished clindamycin yesterday.  Redness, swelling improved 2nd toe only right foot.  Taking doxycycline.  MRI negative abscess and negative osteomyelitis.    PCP: Familia Ramirez Jr, MD    Date Last Seen by PCP: 2/11/19 TORI Best  Chief Complaint   Patient presents with    Follow-up     right foot wound         Current shoe gear:  Affected Foot: sx shoe right     Unaffected Foot: Rx diabetic extra depth shoes and custom accommodative insoles    History of Trauma: negative  Sign of Infection: none systemically - local findings below.    Hemoglobin A1C   Date Value Ref Range Status   02/11/2019 6.2 (H) 4.0 - 5.6 % Final     Comment:      ADA Screening Guidelines:  5.7-6.4%  Consistent with prediabetes  >or=6.5%  Consistent with diabetes  High levels of fetal hemoglobin interfere with the HbA1C  assay. Heterozygous hemoglobin variants (HbS, HgC, etc)do  not significantly interfere with this assay.   However, presence of multiple variants may affect accuracy.     10/22/2018 5.9 (H) 4.0 - 5.6 % Final     Comment:     ADA Screening Guidelines:  5.7-6.4%  Consistent with prediabetes  >or=6.5%  Consistent with diabetes  High levels of fetal hemoglobin interfere with the HbA1C  assay. Heterozygous hemoglobin variants (HbS, HgC, etc)do  not significantly interfere with this assay.   However, presence of multiple variants may affect accuracy.     10/07/2018 6.0 (H) 4.0 - 5.6 % Final     Comment:     ADA Screening Guidelines:  5.7-6.4%  Consistent with prediabetes  >or=6.5%  Consistent with diabetes  High levels of fetal hemoglobin interfere with the HbA1C  assay. Heterozygous hemoglobin variants (HbS, HgC, etc)do  not significantly interfere with this assay.   However, presence of multiple variants may affect accuracy.     06/17/2013 6.8 (H) 4.8 - 5.6 % Final     Comment:              Increased risk for diabetes: 5.7 - 6.4           Diabetes: >6.4           Glycemic control for adults with diabetes: <7.0  **In order to standardize %HbA1c results worldwide, as of October 11, 2010,  the %HbA1c is being calculated using the master equation recommended in the  consensus statement adopted by the ADA (American Diabetes Assoc), EASD  (European Assoc for the Study of Diabetes), IFCC (International Federation  of Clinical Chemistry and Laboratory Medicine) and IDF (International  Diabetes Federation). Result units: %HgbA1c (DCCT/NGSP).  In common with other methods, Hb A1C values may not accurately reflect mean  blood glucose in patients with hemoglobin variants (HgbF, HgbS and HgbC).  Any cause of shortened erythrocyte survival will reduce exposure  of  erythrocytes to glucose with a consequent decrease in HbA1c (%) values, even  though the time-averaged blood glucose level may be elevated. Causes of  shortened erythrocyte lifetime might be hemolytic anemia or other hemolytic  diseases, homozygous sickle cell trait, pregnancy, recent significant or  chronic blood loss, etc. Caution should be used when interpreting the HbA1c  results from patients with these conditions.       Review of Systems   Constitution: Negative for chills, diaphoresis, fever, malaise/fatigue and night sweats.   Cardiovascular: Positive for leg swelling. Negative for claudication, cyanosis and syncope.   Skin: Positive for poor wound healing. Negative for color change, dry skin, nail changes, rash, suspicious lesions and unusual hair distribution.   Musculoskeletal: Negative for falls, joint pain, joint swelling, muscle cramps, muscle weakness and stiffness.   Gastrointestinal: Negative for constipation, diarrhea, nausea and vomiting.   Neurological: Positive for numbness, paresthesias and sensory change. Negative for brief paralysis, disturbances in coordination, focal weakness and tremors.           Objective:      Physical Exam   Constitutional: He appears well-developed and well-nourished. He is cooperative. No distress.   Cardiovascular:   Pulses:       Dorsalis pedis pulses are 1+ on the right side, and 1+ on the left side.        Posterior tibial pulses are 1+ on the right side, and 1+ on the left side.   Toes warm, pink, cap fill <5 seconds.    <2+ pitting edema both legs.   Musculoskeletal:   Normal angle, base, station of gait. All ten toes without clubbing, cyanosis, or signs of ischemia.  No pain to palpation bilateral lower extremities.  Range of motion, stability, muscle strength, and muscle tone normal bilateral feet and legs.    Lymphadenopathy:   Negative lymphadenopathy bilateral popliteal fossa and tarsal tunnel.     Neurological: He has normal strength. He displays no  atrophy and no tremor. A sensory deficit is present. He exhibits normal muscle tone.   Reflex Scores:       Patellar reflexes are 1+ on the right side and 1+ on the left side.       Achilles reflexes are 1+ on the right side and 1+ on the left side.  Diminished/loss of protective sensation all toes bilateral to 10 gram monofilament.     Skin: Skin is warm and dry. No abrasion, no bruising, no burn, no ecchymosis, no laceration, no lesion and no rash noted. He is not diaphoretic. No erythema. No pallor.   Wound:  Lateral right 5th mtpj    Size:    Post: 0.2 cm x 0.2 cm x 0.1 cm    Base:  Granular, pink with moderate serous/serosanaguinous drainage only.  No pus, tracking, fluctuance, malodor, or cardinal signs infection.    Borders:  Hyperkeratotic, debriding to flat, pink, blanchable skin edges without undermining.                     Assessment:       Encounter Diagnoses   Name Primary?    Skin ulcer of toe of right foot with fat layer exposed Yes    Type II diabetes mellitus with neurological manifestations     PVD (peripheral vascular disease)          Plan:       Richard was seen today for follow-up.    Diagnoses and all orders for this visit:    Skin ulcer of toe of right foot with fat layer exposed    Type II diabetes mellitus with neurological manifestations    PVD (peripheral vascular disease)      I counseled the patient on his conditions, their implications and medical management.    With patient's permission, cleansed right 5th MTPJ wound with normal saline and performed sharp, selective debridement of devitalized hyperkeratotic tissue and biofilm, < 20 cm sq., extending down to the level of dermis via 3 mm dermal curette to patient's tolerance without incident.      Dressed both wounds with nicole, wound foam, kerlix.  Change same every other day.    Ambulate minimally in sx shoe bilateral.    Adequate vitamin supplementation, protein intake, and hydration - discussed with patient.            Follow up  in about 1 week (around 5/1/2019) for wound care.

## 2019-05-06 NOTE — PATIENT INSTRUCTIONS
- Perform wound care as previously instructed.    - Notify clinic if any new or worsening condition arises.    - Follow up in 1 week with Dr. Torrez for wound care.

## 2019-05-08 DIAGNOSIS — I25.10 CORONARY ARTERY DISEASE INVOLVING NATIVE CORONARY ARTERY OF NATIVE HEART WITHOUT ANGINA PECTORIS: Chronic | ICD-10-CM

## 2019-05-08 RX ORDER — FUROSEMIDE 40 MG/1
TABLET ORAL
Qty: 90 TABLET | Refills: 3 | Status: SHIPPED | OUTPATIENT
Start: 2019-05-08 | End: 2020-09-20 | Stop reason: SDUPTHER

## 2019-05-09 ENCOUNTER — DOCUMENTATION ONLY (OUTPATIENT)
Dept: FAMILY MEDICINE | Facility: CLINIC | Age: 74
End: 2019-05-09

## 2019-05-09 NOTE — PROGRESS NOTES
Pre-Visit Chart Review  For Appointment Scheduled on 05/13/2019    Health Maintenance Due   Topic Date Due    Eye Exam  05/21/2019

## 2019-05-13 ENCOUNTER — HOSPITAL ENCOUNTER (OUTPATIENT)
Dept: RADIOLOGY | Facility: CLINIC | Age: 74
Discharge: HOME | End: 2019-05-13
Attending: PODIATRIST
Payer: MEDICARE

## 2019-05-13 ENCOUNTER — OFFICE VISIT (OUTPATIENT)
Dept: FAMILY MEDICINE | Facility: CLINIC | Age: 74
End: 2019-05-13
Payer: MEDICARE

## 2019-05-13 ENCOUNTER — OFFICE VISIT (OUTPATIENT)
Dept: PODIATRY | Facility: CLINIC | Age: 74
End: 2019-05-13
Payer: MEDICARE

## 2019-05-13 ENCOUNTER — TELEPHONE (OUTPATIENT)
Dept: HEMATOLOGY/ONCOLOGY | Facility: CLINIC | Age: 74
End: 2019-05-13

## 2019-05-13 VITALS
HEIGHT: 69 IN | BODY MASS INDEX: 42.52 KG/M2 | TEMPERATURE: 98 F | WEIGHT: 287.06 LBS | DIASTOLIC BLOOD PRESSURE: 73 MMHG | SYSTOLIC BLOOD PRESSURE: 132 MMHG | HEART RATE: 53 BPM

## 2019-05-13 VITALS
HEART RATE: 54 BPM | DIASTOLIC BLOOD PRESSURE: 73 MMHG | HEIGHT: 69 IN | SYSTOLIC BLOOD PRESSURE: 124 MMHG | BODY MASS INDEX: 42.51 KG/M2 | WEIGHT: 287 LBS

## 2019-05-13 DIAGNOSIS — I15.2 HYPERTENSION ASSOCIATED WITH DIABETES: ICD-10-CM

## 2019-05-13 DIAGNOSIS — L97.512 SKIN ULCER OF RIGHT FOOT WITH FAT LAYER EXPOSED: ICD-10-CM

## 2019-05-13 DIAGNOSIS — F11.20 UNCOMPLICATED OPIOID DEPENDENCE: ICD-10-CM

## 2019-05-13 DIAGNOSIS — E78.00 PURE HYPERCHOLESTEROLEMIA: ICD-10-CM

## 2019-05-13 DIAGNOSIS — I73.9 PVD (PERIPHERAL VASCULAR DISEASE): ICD-10-CM

## 2019-05-13 DIAGNOSIS — E11.59 HYPERTENSION ASSOCIATED WITH DIABETES: ICD-10-CM

## 2019-05-13 DIAGNOSIS — E11.49 TYPE II DIABETES MELLITUS WITH NEUROLOGICAL MANIFESTATIONS: ICD-10-CM

## 2019-05-13 DIAGNOSIS — E11.22 TYPE 2 DIABETES MELLITUS WITH STAGE 3 CHRONIC KIDNEY DISEASE, WITHOUT LONG-TERM CURRENT USE OF INSULIN: Chronic | ICD-10-CM

## 2019-05-13 DIAGNOSIS — I25.119 ATHEROSCLEROSIS OF NATIVE CORONARY ARTERY WITH ANGINA PECTORIS, UNSPECIFIED WHETHER NATIVE OR TRANSPLANTED HEART: ICD-10-CM

## 2019-05-13 DIAGNOSIS — F41.1 GAD (GENERALIZED ANXIETY DISORDER): ICD-10-CM

## 2019-05-13 DIAGNOSIS — I42.9 CARDIOMYOPATHY, UNSPECIFIED TYPE: ICD-10-CM

## 2019-05-13 DIAGNOSIS — N18.30 TYPE 2 DIABETES MELLITUS WITH STAGE 3 CHRONIC KIDNEY DISEASE, WITHOUT LONG-TERM CURRENT USE OF INSULIN: Chronic | ICD-10-CM

## 2019-05-13 DIAGNOSIS — F11.90 CHRONIC, CONTINUOUS USE OF OPIOIDS: Primary | ICD-10-CM

## 2019-05-13 DIAGNOSIS — E11.49 TYPE II DIABETES MELLITUS WITH NEUROLOGICAL MANIFESTATIONS: Primary | ICD-10-CM

## 2019-05-13 DIAGNOSIS — Z79.899 CHRONICALLY ON BENZODIAZEPINE THERAPY: ICD-10-CM

## 2019-05-13 PROCEDURE — 3075F PR MOST RECENT SYSTOLIC BLOOD PRESS GE 130-139MM HG: ICD-10-PCS | Mod: CPTII,S$GLB,, | Performed by: PHYSICIAN ASSISTANT

## 2019-05-13 PROCEDURE — 99213 OFFICE O/P EST LOW 20 MIN: CPT | Mod: 25,S$GLB,, | Performed by: PODIATRIST

## 2019-05-13 PROCEDURE — 3078F PR MOST RECENT DIASTOLIC BLOOD PRESSURE < 80 MM HG: ICD-10-PCS | Mod: CPTII,S$GLB,, | Performed by: PHYSICIAN ASSISTANT

## 2019-05-13 PROCEDURE — 99214 OFFICE O/P EST MOD 30 MIN: CPT | Mod: S$GLB,,, | Performed by: PHYSICIAN ASSISTANT

## 2019-05-13 PROCEDURE — 1101F PT FALLS ASSESS-DOCD LE1/YR: CPT | Mod: CPTII,S$GLB,, | Performed by: PODIATRIST

## 2019-05-13 PROCEDURE — 99999 PR PBB SHADOW E&M-EST. PATIENT-LVL V: ICD-10-PCS | Mod: PBBFAC,,, | Performed by: PHYSICIAN ASSISTANT

## 2019-05-13 PROCEDURE — 3075F SYST BP GE 130 - 139MM HG: CPT | Mod: CPTII,S$GLB,, | Performed by: PHYSICIAN ASSISTANT

## 2019-05-13 PROCEDURE — 3078F DIAST BP <80 MM HG: CPT | Mod: CPTII,S$GLB,, | Performed by: PODIATRIST

## 2019-05-13 PROCEDURE — 3078F DIAST BP <80 MM HG: CPT | Mod: CPTII,S$GLB,, | Performed by: PHYSICIAN ASSISTANT

## 2019-05-13 PROCEDURE — 99214 PR OFFICE/OUTPT VISIT, EST, LEVL IV, 30-39 MIN: ICD-10-PCS | Mod: S$GLB,,, | Performed by: PHYSICIAN ASSISTANT

## 2019-05-13 PROCEDURE — 99499 UNLISTED E&M SERVICE: CPT | Mod: S$GLB,,, | Performed by: PODIATRIST

## 2019-05-13 PROCEDURE — 73630 XR FOOT COMPLETE 3 VIEW RIGHT: ICD-10-PCS | Mod: 26,RT,S$GLB, | Performed by: RADIOLOGY

## 2019-05-13 PROCEDURE — 1101F PR PT FALLS ASSESS DOC 0-1 FALLS W/OUT INJ PAST YR: ICD-10-PCS | Mod: CPTII,S$GLB,, | Performed by: PHYSICIAN ASSISTANT

## 2019-05-13 PROCEDURE — 1101F PT FALLS ASSESS-DOCD LE1/YR: CPT | Mod: CPTII,S$GLB,, | Performed by: PHYSICIAN ASSISTANT

## 2019-05-13 PROCEDURE — 11042 DBRDMT SUBQ TIS 1ST 20SQCM/<: CPT | Mod: S$GLB,,, | Performed by: PODIATRIST

## 2019-05-13 PROCEDURE — 99999 PR PBB SHADOW E&M-EST. PATIENT-LVL V: CPT | Mod: PBBFAC,,, | Performed by: PHYSICIAN ASSISTANT

## 2019-05-13 PROCEDURE — 99999 PR PBB SHADOW E&M-EST. PATIENT-LVL III: ICD-10-PCS | Mod: PBBFAC,,, | Performed by: PODIATRIST

## 2019-05-13 PROCEDURE — 11042 WOUND DEBRIDEMENT: ICD-10-PCS | Mod: S$GLB,,, | Performed by: PODIATRIST

## 2019-05-13 PROCEDURE — 73630 X-RAY EXAM OF FOOT: CPT | Mod: TC,FY,PO,RT

## 2019-05-13 PROCEDURE — 3044F PR MOST RECENT HEMOGLOBIN A1C LEVEL <7.0%: ICD-10-PCS | Mod: CPTII,S$GLB,, | Performed by: PHYSICIAN ASSISTANT

## 2019-05-13 PROCEDURE — 3074F PR MOST RECENT SYSTOLIC BLOOD PRESSURE < 130 MM HG: ICD-10-PCS | Mod: CPTII,S$GLB,, | Performed by: PODIATRIST

## 2019-05-13 PROCEDURE — 99499 RISK ADDL DX/OHS AUDIT: ICD-10-PCS | Mod: S$GLB,,, | Performed by: PODIATRIST

## 2019-05-13 PROCEDURE — 3078F PR MOST RECENT DIASTOLIC BLOOD PRESSURE < 80 MM HG: ICD-10-PCS | Mod: CPTII,S$GLB,, | Performed by: PODIATRIST

## 2019-05-13 PROCEDURE — 99499 RISK ADDL DX/OHS AUDIT: ICD-10-PCS | Mod: S$GLB,,, | Performed by: PHYSICIAN ASSISTANT

## 2019-05-13 PROCEDURE — 73630 X-RAY EXAM OF FOOT: CPT | Mod: 26,RT,S$GLB, | Performed by: RADIOLOGY

## 2019-05-13 PROCEDURE — 3044F HG A1C LEVEL LT 7.0%: CPT | Mod: CPTII,S$GLB,, | Performed by: PODIATRIST

## 2019-05-13 PROCEDURE — 1101F PR PT FALLS ASSESS DOC 0-1 FALLS W/OUT INJ PAST YR: ICD-10-PCS | Mod: CPTII,S$GLB,, | Performed by: PODIATRIST

## 2019-05-13 PROCEDURE — 3044F PR MOST RECENT HEMOGLOBIN A1C LEVEL <7.0%: ICD-10-PCS | Mod: CPTII,S$GLB,, | Performed by: PODIATRIST

## 2019-05-13 PROCEDURE — 3074F SYST BP LT 130 MM HG: CPT | Mod: CPTII,S$GLB,, | Performed by: PODIATRIST

## 2019-05-13 PROCEDURE — 99499 UNLISTED E&M SERVICE: CPT | Mod: S$GLB,,, | Performed by: PHYSICIAN ASSISTANT

## 2019-05-13 PROCEDURE — 99999 PR PBB SHADOW E&M-EST. PATIENT-LVL III: CPT | Mod: PBBFAC,,, | Performed by: PODIATRIST

## 2019-05-13 PROCEDURE — 3044F HG A1C LEVEL LT 7.0%: CPT | Mod: CPTII,S$GLB,, | Performed by: PHYSICIAN ASSISTANT

## 2019-05-13 PROCEDURE — 99213 PR OFFICE/OUTPT VISIT, EST, LEVL III, 20-29 MIN: ICD-10-PCS | Mod: 25,S$GLB,, | Performed by: PODIATRIST

## 2019-05-13 RX ORDER — BUSPIRONE HYDROCHLORIDE 7.5 MG/1
TABLET ORAL
Qty: 60 TABLET | Refills: 2 | Status: SHIPPED | OUTPATIENT
Start: 2019-05-13 | End: 2019-06-18

## 2019-05-13 NOTE — PROCEDURES
"Wound Debridement  Date/Time: 5/13/2019 10:37 AM  Performed by: Iván Torrez DPM  Authorized by: Iván Torrez DPM     Time out: Immediately prior to procedure a "time out" was called to verify the correct patient, procedure, equipment, support staff and site/side marked as required.    Consent Done?:  Yes (Verbal)  Local anesthesia used?: No      Wound Details:    Location:  Right foot    Location:  Right 1st Metatarsal Head    Type of Debridement:  Excisional       Length (cm):  1       Area (sq cm):  0.6       Width (cm):  0.6       Percent Debrided (%):  100       Depth (cm):  0.2       Total Area Debrided (sq cm):  0.6    Depth of debridement:  Subcutaneous tissue    Tissue debrided:  Dermis, Epidermis and Subcutaneous    Devitalized tissue debrided:  Biofilm, Callus and Clots    Instruments:  Nippers    Bleeding:  Minimal  Hemostasis Achieved: Yes    Method Used:  Pressure  Patient tolerance:  Patient tolerated the procedure well with no immediate complications      "

## 2019-05-13 NOTE — PROGRESS NOTES
Subjective:      Patient ID: Richard Bustos is a 73 y.o. male.    Chief Complaint: Foot Ulcer (right)    Richard is a 73 y.o. male who presents to the clinic for evaluation and treatment of high risk feet. Richard has a past medical history of Anemia, Anemia of other chronic disease (9/13/2017), Anemia, chronic renal failure (9/13/2017), Anemia, unspecified (9/13/2017), Anticoagulant long-term use, Aorta aneurysm, Arthritis, Atrial fibrillation, CAD (coronary artery disease), CHF (congestive heart failure), Chronic kidney disease, Clotting disorder (9/13/2017), Colon polyp, Diabetes mellitus, Diabetes mellitus type II, Diverticulosis, Elevated PSA, Encounter for blood transfusion, Former smoker, GERD (gastroesophageal reflux disease), Gout, colonic polyps, Hypercoagulable state, Hyperlipidemia, Hypertension, MI, old (2010), Myocardial infarction, Osteomyelitis of ankle or foot (3/9/2017), Prostate cancer (06/2016), Sleep apnea, Squamous cell carcinoma (01/23/2018), and Venous stasis. The patient's chief complaint is diabetic foot ulcer, right forefoot at 1st mtpj.  Unknown onset, discovered yesterday without change since.  Aggravated with pressure, walking.  No self care or prior medical care for this wound for this episode. .    PCP: Familia Ramirez Jr, MD    Date Last Seen by PCP:   Chief Complaint   Patient presents with    Foot Ulcer     right         Current shoe gear:  Affected Foot: sx shoe right     Unaffected Foot: Rx diabetic extra depth shoes and custom accommodative insoles    History of Trauma: negative  Sign of Infection: none    Hemoglobin A1C   Date Value Ref Range Status   02/11/2019 6.2 (H) 4.0 - 5.6 % Final     Comment:     ADA Screening Guidelines:  5.7-6.4%  Consistent with prediabetes  >or=6.5%  Consistent with diabetes  High levels of fetal hemoglobin interfere with the HbA1C  assay. Heterozygous hemoglobin variants (HbS, HgC, etc)do  not significantly interfere with this assay.   However, presence  of multiple variants may affect accuracy.     10/22/2018 5.9 (H) 4.0 - 5.6 % Final     Comment:     ADA Screening Guidelines:  5.7-6.4%  Consistent with prediabetes  >or=6.5%  Consistent with diabetes  High levels of fetal hemoglobin interfere with the HbA1C  assay. Heterozygous hemoglobin variants (HbS, HgC, etc)do  not significantly interfere with this assay.   However, presence of multiple variants may affect accuracy.     10/07/2018 6.0 (H) 4.0 - 5.6 % Final     Comment:     ADA Screening Guidelines:  5.7-6.4%  Consistent with prediabetes  >or=6.5%  Consistent with diabetes  High levels of fetal hemoglobin interfere with the HbA1C  assay. Heterozygous hemoglobin variants (HbS, HgC, etc)do  not significantly interfere with this assay.   However, presence of multiple variants may affect accuracy.     06/17/2013 6.8 (H) 4.8 - 5.6 % Final     Comment:              Increased risk for diabetes: 5.7 - 6.4           Diabetes: >6.4           Glycemic control for adults with diabetes: <7.0  **In order to standardize %HbA1c results worldwide, as of October 11, 2010,  the %HbA1c is being calculated using the master equation recommended in the  consensus statement adopted by the ADA (American Diabetes Assoc), EASD  (European Assoc for the Study of Diabetes), IFCC (International Federation  of Clinical Chemistry and Laboratory Medicine) and IDF (International  Diabetes Federation). Result units: %HgbA1c (DCCT/NGSP).  In common with other methods, Hb A1C values may not accurately reflect mean  blood glucose in patients with hemoglobin variants (HgbF, HgbS and HgbC).  Any cause of shortened erythrocyte survival will reduce exposure of  erythrocytes to glucose with a consequent decrease in HbA1c (%) values, even  though the time-averaged blood glucose level may be elevated. Causes of  shortened erythrocyte lifetime might be hemolytic anemia or other hemolytic  diseases, homozygous sickle cell trait, pregnancy, recent significant  or  chronic blood loss, etc. Caution should be used when interpreting the HbA1c  results from patients with these conditions.       Review of Systems   Constitution: Negative for chills, diaphoresis, fever, malaise/fatigue and night sweats.   Cardiovascular: Positive for leg swelling. Negative for claudication, cyanosis and syncope.   Skin: Positive for poor wound healing. Negative for color change, dry skin, nail changes, rash, suspicious lesions and unusual hair distribution.   Musculoskeletal: Negative for falls, joint pain, joint swelling, muscle cramps, muscle weakness and stiffness.   Gastrointestinal: Negative for constipation, diarrhea, nausea and vomiting.   Neurological: Positive for numbness, paresthesias and sensory change. Negative for brief paralysis, disturbances in coordination, focal weakness and tremors.           Objective:      Physical Exam   Constitutional: He appears well-developed and well-nourished. He is cooperative. No distress.   Cardiovascular:   Pulses:       Dorsalis pedis pulses are 1+ on the right side, and 1+ on the left side.        Posterior tibial pulses are 1+ on the right side, and 1+ on the left side.   Toes warm, pink, cap fill <5 seconds.   Lymphadenopathy:   Negative lymphadenopathy bilateral popliteal fossa and tarsal tunnel.     Neurological: A sensory deficit is present.   Diminished/loss of protective sensation all toes bilateral to 10 gram monofilament.       Skin: Skin is warm and dry. No abrasion, no bruising, no burn, no ecchymosis, no laceration, no lesion and no rash noted. He is not diaphoretic. No erythema. No pallor.       Wound:  Plantar right 1st mtpj    Size:    Pre:  [id number]  Post: [id number]    Base:  Granular, pink with moderate serous/serosanaguinous drainage only.  No pus, tracking, fluctuance, malodor, or cardinal signs infection.    Borders:  Hyperkeratotic, debriding to flat, pink, blanchable skin edges without undermining.                 Assessment:        Encounter Diagnoses   Name Primary?    Type II diabetes mellitus with neurological manifestations Yes    Skin ulcer of right foot with fat layer exposed     PVD (peripheral vascular disease)          Plan:       Richard was seen today for foot ulcer.    Diagnoses and all orders for this visit:    Type II diabetes mellitus with neurological manifestations  -     X-Ray Foot Complete Right; Future    Skin ulcer of right foot with fat layer exposed  -     X-Ray Foot Complete Right; Future    PVD (peripheral vascular disease)  -     X-Ray Foot Complete Right; Future      I counseled the patient on his conditions, their implications and medical management.        Debride ulcer right 1st mtpj.    Dressed ulcer with aperture, wound foam, kerlix - change same every other day.    Dispense new sx shoe right.    xrays right foot.      Adequate vitamin supplementation, protein intake, and hydration - discussed with patient.            Follow up in about 1 week (around 5/20/2019).

## 2019-05-13 NOTE — TELEPHONE ENCOUNTER
We do not follow his coumadin     ----- Message from Kai Ortiz MD sent at 5/13/2019  8:19 AM CDT -----  INR is subtherapeutuc; need to increase his dose of coumadin; are we the ones managing it?

## 2019-05-14 ENCOUNTER — OFFICE VISIT (OUTPATIENT)
Dept: UROLOGY | Facility: CLINIC | Age: 74
End: 2019-05-14
Payer: MEDICARE

## 2019-05-14 VITALS
DIASTOLIC BLOOD PRESSURE: 67 MMHG | WEIGHT: 288.5 LBS | BODY MASS INDEX: 42.73 KG/M2 | HEART RATE: 65 BPM | HEIGHT: 69 IN | SYSTOLIC BLOOD PRESSURE: 121 MMHG

## 2019-05-14 DIAGNOSIS — N28.1 COMPLEX RENAL CYST: ICD-10-CM

## 2019-05-14 DIAGNOSIS — C61 PROSTATE CANCER: Primary | ICD-10-CM

## 2019-05-14 DIAGNOSIS — N32.81 OVERACTIVE BLADDER: ICD-10-CM

## 2019-05-14 LAB
BILIRUB SERPL-MCNC: ABNORMAL MG/DL
BLOOD URINE, POC: ABNORMAL
COLOR, POC UA: YELLOW
GLUCOSE UR QL STRIP: ABNORMAL
KETONES UR QL STRIP: ABNORMAL
LEUKOCYTE ESTERASE URINE, POC: ABNORMAL
NITRITE, POC UA: ABNORMAL
PH, POC UA: 5.5
PROTEIN, POC: 100
SPECIFIC GRAVITY, POC UA: 1.02
UROBILINOGEN, POC UA: ABNORMAL

## 2019-05-14 PROCEDURE — 99214 OFFICE O/P EST MOD 30 MIN: CPT | Mod: 25,S$GLB,, | Performed by: UROLOGY

## 2019-05-14 PROCEDURE — 81002 URINALYSIS NONAUTO W/O SCOPE: CPT | Mod: S$GLB,,, | Performed by: UROLOGY

## 2019-05-14 PROCEDURE — 81002 POCT URINE DIPSTICK WITHOUT MICROSCOPE: ICD-10-PCS | Mod: S$GLB,,, | Performed by: UROLOGY

## 2019-05-14 PROCEDURE — 99499 UNLISTED E&M SERVICE: CPT | Mod: S$GLB,,, | Performed by: UROLOGY

## 2019-05-14 PROCEDURE — 99214 PR OFFICE/OUTPT VISIT, EST, LEVL IV, 30-39 MIN: ICD-10-PCS | Mod: 25,S$GLB,, | Performed by: UROLOGY

## 2019-05-14 PROCEDURE — 3074F SYST BP LT 130 MM HG: CPT | Mod: CPTII,S$GLB,, | Performed by: UROLOGY

## 2019-05-14 PROCEDURE — 1101F PT FALLS ASSESS-DOCD LE1/YR: CPT | Mod: CPTII,S$GLB,, | Performed by: UROLOGY

## 2019-05-14 PROCEDURE — 99999 PR PBB SHADOW E&M-EST. PATIENT-LVL V: ICD-10-PCS | Mod: PBBFAC,,, | Performed by: UROLOGY

## 2019-05-14 PROCEDURE — 3074F PR MOST RECENT SYSTOLIC BLOOD PRESSURE < 130 MM HG: ICD-10-PCS | Mod: CPTII,S$GLB,, | Performed by: UROLOGY

## 2019-05-14 PROCEDURE — 3078F DIAST BP <80 MM HG: CPT | Mod: CPTII,S$GLB,, | Performed by: UROLOGY

## 2019-05-14 PROCEDURE — 3078F PR MOST RECENT DIASTOLIC BLOOD PRESSURE < 80 MM HG: ICD-10-PCS | Mod: CPTII,S$GLB,, | Performed by: UROLOGY

## 2019-05-14 PROCEDURE — 99999 PR PBB SHADOW E&M-EST. PATIENT-LVL V: CPT | Mod: PBBFAC,,, | Performed by: UROLOGY

## 2019-05-14 PROCEDURE — 1101F PR PT FALLS ASSESS DOC 0-1 FALLS W/OUT INJ PAST YR: ICD-10-PCS | Mod: CPTII,S$GLB,, | Performed by: UROLOGY

## 2019-05-14 PROCEDURE — 99499 RISK ADDL DX/OHS AUDIT: ICD-10-PCS | Mod: S$GLB,,, | Performed by: UROLOGY

## 2019-05-14 NOTE — PATIENT INSTRUCTIONS
Overactive bladder and urge incontinence  -continue myrbetriq 50mg once daily, refill for year given in jan  -nighttime leakage not bothersome enough to wake up. Doing well during day on myrbetriq    Prostate cancer, Gl 4+4 treated with radiation and ADT  -ADT x 2.5 years (last one was July 2018)  -change to psa every 6 months now  -in 2 years (2021) change to yearly FOREVER     hemhorragic renal cyst, RLP complex cyst  - rbus yearly (jan 2020)      psa in 6-7 months  Ultrasound in 6-7 months

## 2019-05-20 ENCOUNTER — OFFICE VISIT (OUTPATIENT)
Dept: PODIATRY | Facility: CLINIC | Age: 74
End: 2019-05-20
Payer: MEDICARE

## 2019-05-20 VITALS — WEIGHT: 288.38 LBS | BODY MASS INDEX: 42.71 KG/M2 | HEIGHT: 69 IN

## 2019-05-20 DIAGNOSIS — L97.512 SKIN ULCER OF RIGHT FOOT WITH FAT LAYER EXPOSED: Primary | ICD-10-CM

## 2019-05-20 DIAGNOSIS — E11.49 TYPE II DIABETES MELLITUS WITH NEUROLOGICAL MANIFESTATIONS: ICD-10-CM

## 2019-05-20 DIAGNOSIS — I73.9 PVD (PERIPHERAL VASCULAR DISEASE): ICD-10-CM

## 2019-05-20 PROCEDURE — 99499 NO LOS: ICD-10-PCS | Mod: S$GLB,,, | Performed by: PODIATRIST

## 2019-05-20 PROCEDURE — 99499 UNLISTED E&M SERVICE: CPT | Mod: S$GLB,,, | Performed by: PODIATRIST

## 2019-05-20 PROCEDURE — 99999 PR PBB SHADOW E&M-EST. PATIENT-LVL III: CPT | Mod: PBBFAC,,, | Performed by: PODIATRIST

## 2019-05-20 PROCEDURE — 11042 DBRDMT SUBQ TIS 1ST 20SQCM/<: CPT | Mod: S$GLB,,, | Performed by: PODIATRIST

## 2019-05-20 PROCEDURE — 99999 PR PBB SHADOW E&M-EST. PATIENT-LVL III: ICD-10-PCS | Mod: PBBFAC,,, | Performed by: PODIATRIST

## 2019-05-20 PROCEDURE — 11042 PR DEBRIDEMENT, SKIN, SUB-Q TISSUE,=<20 SQ CM: ICD-10-PCS | Mod: S$GLB,,, | Performed by: PODIATRIST

## 2019-05-20 NOTE — PROCEDURES
"Wound Debridement  Date/Time: 5/20/2019 10:27 AM  Performed by: Iván Torrez DPM  Authorized by: Iván Torrez DPM     Time out: Immediately prior to procedure a "time out" was called to verify the correct patient, procedure, equipment, support staff and site/side marked as required.    Consent Done?:  Yes (Verbal)  Local anesthesia used?: No      Wound Details:    Location:  Right foot    Location:  Right 1st Metatarsal Head    Type of Debridement:  Excisional       Length (cm):  0.5       Area (sq cm):  0.2       Width (cm):  0.4       Percent Debrided (%):  100       Depth (cm):  0.2       Total Area Debrided (sq cm):  0.2    Depth of debridement:  Subcutaneous tissue    Tissue debrided:  Dermis, Epidermis and Subcutaneous    Devitalized tissue debrided:  Biofilm and Callus    Instruments:  Blade    Bleeding:  Minimal  Hemostasis Achieved: Yes    Method Used:  Pressure  Patient tolerance:  Patient tolerated the procedure well with no immediate complications      "

## 2019-05-20 NOTE — PROGRESS NOTES
Subjective:      Patient ID: Richard Bustos is a 73 y.o. male.    Chief Complaint: Follow-up (right foot wound)    Richard is a 73 y.o. male who presents to the clinic for evaluation and treatment of high risk feet. Richard has a past medical history of Anemia, Anemia of other chronic disease (9/13/2017), Anemia, chronic renal failure (9/13/2017), Anemia, unspecified (9/13/2017), Anticoagulant long-term use, Aorta aneurysm, Arthritis, Atrial fibrillation, CAD (coronary artery disease), CHF (congestive heart failure), Chronic kidney disease, Clotting disorder (9/13/2017), Colon polyp, Diabetes mellitus, Diabetes mellitus type II, Diverticulosis, Elevated PSA, Encounter for blood transfusion, Former smoker, GERD (gastroesophageal reflux disease), Gout, colonic polyps, Hypercoagulable state, Hyperlipidemia, Hypertension, MI, old (2010), Myocardial infarction, Osteomyelitis of ankle or foot (3/9/2017), Prostate cancer (06/2016), Sleep apnea, Squamous cell carcinoma (01/23/2018), and Venous stasis. The patient's chief complaint is diabetic foot ulcer, right forefoot at 1st mtpj.  Unknown onset, discovered last week, improving since  Aggravated with pressure, walking.  Dressing with aperture, gauze, tape every day.  Ambulating in sx shoe right, DM shoe left .  xrays negative osteolytic changes from prior.    PCP: Familia Ramirez Jr, MD    Date Last Seen by PCP:   Chief Complaint   Patient presents with    Follow-up     right foot wound         Current shoe gear:  Affected Foot: sx shoe right     Unaffected Foot: Rx diabetic extra depth shoes and custom accommodative insoles    History of Trauma: negative  Sign of Infection: none    Hemoglobin A1C   Date Value Ref Range Status   02/11/2019 6.2 (H) 4.0 - 5.6 % Final     Comment:     ADA Screening Guidelines:  5.7-6.4%  Consistent with prediabetes  >or=6.5%  Consistent with diabetes  High levels of fetal hemoglobin interfere with the HbA1C  assay. Heterozygous hemoglobin variants  (HbS, HgC, etc)do  not significantly interfere with this assay.   However, presence of multiple variants may affect accuracy.     10/22/2018 5.9 (H) 4.0 - 5.6 % Final     Comment:     ADA Screening Guidelines:  5.7-6.4%  Consistent with prediabetes  >or=6.5%  Consistent with diabetes  High levels of fetal hemoglobin interfere with the HbA1C  assay. Heterozygous hemoglobin variants (HbS, HgC, etc)do  not significantly interfere with this assay.   However, presence of multiple variants may affect accuracy.     10/07/2018 6.0 (H) 4.0 - 5.6 % Final     Comment:     ADA Screening Guidelines:  5.7-6.4%  Consistent with prediabetes  >or=6.5%  Consistent with diabetes  High levels of fetal hemoglobin interfere with the HbA1C  assay. Heterozygous hemoglobin variants (HbS, HgC, etc)do  not significantly interfere with this assay.   However, presence of multiple variants may affect accuracy.     06/17/2013 6.8 (H) 4.8 - 5.6 % Final     Comment:              Increased risk for diabetes: 5.7 - 6.4           Diabetes: >6.4           Glycemic control for adults with diabetes: <7.0  **In order to standardize %HbA1c results worldwide, as of October 11, 2010,  the %HbA1c is being calculated using the master equation recommended in the  consensus statement adopted by the ADA (American Diabetes Assoc), EASD  (European Assoc for the Study of Diabetes), IFCC (International Federation  of Clinical Chemistry and Laboratory Medicine) and IDF (International  Diabetes Federation). Result units: %HgbA1c (DCCT/NGSP).  In common with other methods, Hb A1C values may not accurately reflect mean  blood glucose in patients with hemoglobin variants (HgbF, HgbS and HgbC).  Any cause of shortened erythrocyte survival will reduce exposure of  erythrocytes to glucose with a consequent decrease in HbA1c (%) values, even  though the time-averaged blood glucose level may be elevated. Causes of  shortened erythrocyte lifetime might be hemolytic anemia or  other hemolytic  diseases, homozygous sickle cell trait, pregnancy, recent significant or  chronic blood loss, etc. Caution should be used when interpreting the HbA1c  results from patients with these conditions.       Review of Systems   Constitution: Negative for chills, diaphoresis, fever, malaise/fatigue and night sweats.   Cardiovascular: Positive for leg swelling. Negative for claudication, cyanosis and syncope.   Skin: Positive for poor wound healing. Negative for color change, dry skin, nail changes, rash, suspicious lesions and unusual hair distribution.   Musculoskeletal: Negative for falls, joint pain, joint swelling, muscle cramps, muscle weakness and stiffness.   Gastrointestinal: Negative for constipation, diarrhea, nausea and vomiting.   Neurological: Positive for numbness, paresthesias and sensory change. Negative for brief paralysis, disturbances in coordination, focal weakness and tremors.           Objective:      Physical Exam   Constitutional: He appears well-developed and well-nourished. He is cooperative. No distress.   Cardiovascular:   Pulses:       Dorsalis pedis pulses are 1+ on the right side, and 1+ on the left side.        Posterior tibial pulses are 1+ on the right side, and 1+ on the left side.   Toes warm, pink, cap fill <5 seconds.   Lymphadenopathy:   Negative lymphadenopathy bilateral popliteal fossa and tarsal tunnel.     Neurological: A sensory deficit is present.   Diminished/loss of protective sensation all toes bilateral to 10 gram monofilament.       Skin: Skin is warm and dry. No abrasion, no bruising, no burn, no ecchymosis, no laceration, no lesion and no rash noted. He is not diaphoretic. No erythema. No pallor.       Wound:  Plantar right 1st mtpj    Size:    Pre:  [id number]  Post: [id number]    Base:  Granular, pink with moderate serous/serosanaguinous drainage only.  No pus, tracking, fluctuance, malodor, or cardinal signs infection.    Borders:  Hyperkeratotic, debriding  to flat, pink, blanchable skin edges without undermining.                 Assessment:       Encounter Diagnoses   Name Primary?    Skin ulcer of right foot with fat layer exposed Yes    Type II diabetes mellitus with neurological manifestations     PVD (peripheral vascular disease)          Plan:       Richard was seen today for follow-up.    Diagnoses and all orders for this visit:    Skin ulcer of right foot with fat layer exposed    Type II diabetes mellitus with neurological manifestations    PVD (peripheral vascular disease)      I counseled the patient on his conditions, their implications and medical management.    Debride ulcer right 1st mtpj.    Dressed ulcer with aperture, wound foam, kerlix - change same every other day.    Continue minimal ambulation in  sx shoe right, DM shoe inserts left.    Adequate vitamin supplementation, protein intake, and hydration - discussed with patient.            Follow up in about 1 week (around 5/27/2019).

## 2019-05-21 ENCOUNTER — TELEPHONE (OUTPATIENT)
Dept: HEMATOLOGY/ONCOLOGY | Facility: CLINIC | Age: 74
End: 2019-05-21

## 2019-05-21 NOTE — TELEPHONE ENCOUNTER
We do not follow his coumadin     ----- Message from Kai Ortiz MD sent at 5/21/2019  7:23 AM CDT -----  INR good

## 2019-05-31 ENCOUNTER — OFFICE VISIT (OUTPATIENT)
Dept: PODIATRY | Facility: CLINIC | Age: 74
End: 2019-05-31
Payer: MEDICARE

## 2019-05-31 VITALS
BODY MASS INDEX: 42.65 KG/M2 | WEIGHT: 288 LBS | DIASTOLIC BLOOD PRESSURE: 67 MMHG | HEART RATE: 62 BPM | SYSTOLIC BLOOD PRESSURE: 115 MMHG | HEIGHT: 69 IN

## 2019-05-31 DIAGNOSIS — L97.512 SKIN ULCER OF RIGHT FOOT WITH FAT LAYER EXPOSED: Primary | ICD-10-CM

## 2019-05-31 DIAGNOSIS — E11.49 TYPE II DIABETES MELLITUS WITH NEUROLOGICAL MANIFESTATIONS: ICD-10-CM

## 2019-05-31 DIAGNOSIS — I73.9 PVD (PERIPHERAL VASCULAR DISEASE): ICD-10-CM

## 2019-05-31 PROCEDURE — 3044F HG A1C LEVEL LT 7.0%: CPT | Mod: CPTII,S$GLB,, | Performed by: PODIATRIST

## 2019-05-31 PROCEDURE — 1101F PR PT FALLS ASSESS DOC 0-1 FALLS W/OUT INJ PAST YR: ICD-10-PCS | Mod: CPTII,S$GLB,, | Performed by: PODIATRIST

## 2019-05-31 PROCEDURE — 99999 PR PBB SHADOW E&M-EST. PATIENT-LVL III: ICD-10-PCS | Mod: PBBFAC,,, | Performed by: PODIATRIST

## 2019-05-31 PROCEDURE — 1101F PT FALLS ASSESS-DOCD LE1/YR: CPT | Mod: CPTII,S$GLB,, | Performed by: PODIATRIST

## 2019-05-31 PROCEDURE — 3074F SYST BP LT 130 MM HG: CPT | Mod: CPTII,S$GLB,, | Performed by: PODIATRIST

## 2019-05-31 PROCEDURE — 3078F PR MOST RECENT DIASTOLIC BLOOD PRESSURE < 80 MM HG: ICD-10-PCS | Mod: CPTII,S$GLB,, | Performed by: PODIATRIST

## 2019-05-31 PROCEDURE — 99212 PR OFFICE/OUTPT VISIT, EST, LEVL II, 10-19 MIN: ICD-10-PCS | Mod: S$GLB,,, | Performed by: PODIATRIST

## 2019-05-31 PROCEDURE — 99212 OFFICE O/P EST SF 10 MIN: CPT | Mod: S$GLB,,, | Performed by: PODIATRIST

## 2019-05-31 PROCEDURE — 3044F PR MOST RECENT HEMOGLOBIN A1C LEVEL <7.0%: ICD-10-PCS | Mod: CPTII,S$GLB,, | Performed by: PODIATRIST

## 2019-05-31 PROCEDURE — 3078F DIAST BP <80 MM HG: CPT | Mod: CPTII,S$GLB,, | Performed by: PODIATRIST

## 2019-05-31 PROCEDURE — 99999 PR PBB SHADOW E&M-EST. PATIENT-LVL III: CPT | Mod: PBBFAC,,, | Performed by: PODIATRIST

## 2019-05-31 PROCEDURE — 3074F PR MOST RECENT SYSTOLIC BLOOD PRESSURE < 130 MM HG: ICD-10-PCS | Mod: CPTII,S$GLB,, | Performed by: PODIATRIST

## 2019-05-31 NOTE — PROGRESS NOTES
Subjective:      Patient ID: Richard Bustos is a 73 y.o. male.    Chief Complaint: Foot Ulcer (closed ulcer)    Richard is a 73 y.o. male who presents to the clinic for evaluation and treatment of high risk feet. Richard has a past medical history of Anemia, Anemia of other chronic disease (9/13/2017), Anemia, chronic renal failure (9/13/2017), Anemia, unspecified (9/13/2017), Anticoagulant long-term use, Aorta aneurysm, Arthritis, Atrial fibrillation, CAD (coronary artery disease), CHF (congestive heart failure), Chronic kidney disease, Clotting disorder (9/13/2017), Colon polyp, Diabetes mellitus, Diabetes mellitus type II, Diverticulosis, Elevated PSA, Encounter for blood transfusion, Former smoker, GERD (gastroesophageal reflux disease), Gout, colonic polyps, Hypercoagulable state, Hyperlipidemia, Hypertension, MI, old (2010), Myocardial infarction, Osteomyelitis of ankle or foot (3/9/2017), Prostate cancer (06/2016), Sleep apnea, Squamous cell carcinoma (01/23/2018), and Venous stasis. The patient's chief complaint is diabetic foot ulcer, right forefoot at 1st mtpj.  Unknown onset, discovered last week, improving since  Aggravated with pressure, walking.  Dressing with aperture, gauze, tape every day.  Ambulating in sx shoe right, DM shoe left .  xrays negative osteolytic changes from prior.    PCP: Familia Ramirez Jr, MD    Date Last Seen by PCP:   Chief Complaint   Patient presents with    Foot Ulcer     closed ulcer         Current shoe gear:  Affected Foot: sx shoe right     Unaffected Foot: Rx diabetic extra depth shoes and custom accommodative insoles    History of Trauma: negative  Sign of Infection: none    Hemoglobin A1C   Date Value Ref Range Status   02/11/2019 6.2 (H) 4.0 - 5.6 % Final     Comment:     ADA Screening Guidelines:  5.7-6.4%  Consistent with prediabetes  >or=6.5%  Consistent with diabetes  High levels of fetal hemoglobin interfere with the HbA1C  assay. Heterozygous hemoglobin variants (HbS,  HgC, etc)do  not significantly interfere with this assay.   However, presence of multiple variants may affect accuracy.     10/22/2018 5.9 (H) 4.0 - 5.6 % Final     Comment:     ADA Screening Guidelines:  5.7-6.4%  Consistent with prediabetes  >or=6.5%  Consistent with diabetes  High levels of fetal hemoglobin interfere with the HbA1C  assay. Heterozygous hemoglobin variants (HbS, HgC, etc)do  not significantly interfere with this assay.   However, presence of multiple variants may affect accuracy.     10/07/2018 6.0 (H) 4.0 - 5.6 % Final     Comment:     ADA Screening Guidelines:  5.7-6.4%  Consistent with prediabetes  >or=6.5%  Consistent with diabetes  High levels of fetal hemoglobin interfere with the HbA1C  assay. Heterozygous hemoglobin variants (HbS, HgC, etc)do  not significantly interfere with this assay.   However, presence of multiple variants may affect accuracy.     06/17/2013 6.8 (H) 4.8 - 5.6 % Final     Comment:              Increased risk for diabetes: 5.7 - 6.4           Diabetes: >6.4           Glycemic control for adults with diabetes: <7.0  **In order to standardize %HbA1c results worldwide, as of October 11, 2010,  the %HbA1c is being calculated using the master equation recommended in the  consensus statement adopted by the ADA (American Diabetes Assoc), EASD  (European Assoc for the Study of Diabetes), IFCC (International Federation  of Clinical Chemistry and Laboratory Medicine) and IDF (International  Diabetes Federation). Result units: %HgbA1c (DCCT/NGSP).  In common with other methods, Hb A1C values may not accurately reflect mean  blood glucose in patients with hemoglobin variants (HgbF, HgbS and HgbC).  Any cause of shortened erythrocyte survival will reduce exposure of  erythrocytes to glucose with a consequent decrease in HbA1c (%) values, even  though the time-averaged blood glucose level may be elevated. Causes of  shortened erythrocyte lifetime might be hemolytic anemia or other  hemolytic  diseases, homozygous sickle cell trait, pregnancy, recent significant or  chronic blood loss, etc. Caution should be used when interpreting the HbA1c  results from patients with these conditions.       Review of Systems   Constitution: Negative for chills, diaphoresis, fever, malaise/fatigue and night sweats.   Cardiovascular: Positive for leg swelling. Negative for claudication, cyanosis and syncope.   Skin: Positive for poor wound healing. Negative for color change, dry skin, nail changes, rash, suspicious lesions and unusual hair distribution.   Musculoskeletal: Negative for falls, joint pain, joint swelling, muscle cramps, muscle weakness and stiffness.   Gastrointestinal: Negative for constipation, diarrhea, nausea and vomiting.   Neurological: Positive for numbness, paresthesias and sensory change. Negative for brief paralysis, disturbances in coordination, focal weakness and tremors.           Objective:      Physical Exam   Constitutional: He appears well-developed and well-nourished. He is cooperative. No distress.   Cardiovascular:   Pulses:       Dorsalis pedis pulses are 1+ on the right side, and 1+ on the left side.        Posterior tibial pulses are 1+ on the right side, and 1+ on the left side.   Toes warm, pink, cap fill <5 seconds.   Lymphadenopathy:   Negative lymphadenopathy bilateral popliteal fossa and tarsal tunnel.     Neurological: A sensory deficit is present.   Diminished/loss of protective sensation all toes bilateral to 10 gram monofilament.       Skin: Skin is warm and dry. No abrasion, no bruising, no burn, no ecchymosis, no laceration, no lesion and no rash noted. He is not diaphoretic. No erythema. No pallor.       Wound:  Plantar right 1st mtpj    Size:    Pre:  [id number]    Base:  Granular, pink with moderate serous/serosanaguinous drainage only.  No pus, tracking, fluctuance, malodor, or cardinal signs infection.    Borders:  Hyperkeratotic, debriding to flat, pink,  blanchable skin edges without undermining.                 Assessment:       Encounter Diagnoses   Name Primary?    Skin ulcer of right foot with fat layer exposed Yes    Type II diabetes mellitus with neurological manifestations     PVD (peripheral vascular disease)          Plan:       Richard was seen today for foot ulcer.    Diagnoses and all orders for this visit:    Skin ulcer of right foot with fat layer exposed    Type II diabetes mellitus with neurological manifestations    PVD (peripheral vascular disease)      I counseled the patient on his conditions, their implications and medical management.    Dressed ulcer with aperture, wound foam, kerlix - change same every other day.    Continue minimal ambulation in  sx shoe right, DM shoe inserts left.    Adequate vitamin supplementation, protein intake, and hydration - discussed with patient.            Follow up in about 1 week (around 6/7/2019).

## 2019-06-01 DIAGNOSIS — G31.84 MCI (MILD COGNITIVE IMPAIRMENT): ICD-10-CM

## 2019-06-01 DIAGNOSIS — R41.3 MEMORY LOSS: ICD-10-CM

## 2019-06-01 DIAGNOSIS — I25.10 CORONARY ARTERY DISEASE INVOLVING NATIVE CORONARY ARTERY OF NATIVE HEART WITHOUT ANGINA PECTORIS: Chronic | ICD-10-CM

## 2019-06-03 DIAGNOSIS — R41.3 MEMORY LOSS: ICD-10-CM

## 2019-06-03 DIAGNOSIS — G31.84 MCI (MILD COGNITIVE IMPAIRMENT): ICD-10-CM

## 2019-06-03 RX ORDER — ESCITALOPRAM OXALATE 10 MG/1
TABLET ORAL
Qty: 90 TABLET | Refills: 0 | OUTPATIENT
Start: 2019-06-03

## 2019-06-03 RX ORDER — CARVEDILOL 25 MG/1
25 TABLET ORAL 2 TIMES DAILY WITH MEALS
Qty: 180 TABLET | Refills: 2 | Status: SHIPPED | OUTPATIENT
Start: 2019-06-03 | End: 2020-02-11 | Stop reason: SDUPTHER

## 2019-06-03 RX ORDER — MEMANTINE HYDROCHLORIDE 5 MG/1
5 TABLET ORAL 2 TIMES DAILY
Qty: 60 TABLET | Refills: 3
Start: 2019-06-03 | End: 2019-06-04

## 2019-06-03 RX ORDER — CLOPIDOGREL BISULFATE 75 MG/1
TABLET ORAL
Qty: 90 TABLET | Refills: 0 | Status: SHIPPED | OUTPATIENT
Start: 2019-06-03 | End: 2019-09-25 | Stop reason: SDUPTHER

## 2019-06-03 RX ORDER — MEMANTINE HYDROCHLORIDE 5 MG/1
TABLET ORAL
Qty: 60 TABLET | Refills: 6 | Status: SHIPPED | OUTPATIENT
Start: 2019-06-03 | End: 2019-06-18 | Stop reason: SDUPTHER

## 2019-06-10 ENCOUNTER — OFFICE VISIT (OUTPATIENT)
Dept: PODIATRY | Facility: CLINIC | Age: 74
End: 2019-06-10
Payer: MEDICARE

## 2019-06-10 VITALS — HEIGHT: 69 IN | WEIGHT: 287.94 LBS | BODY MASS INDEX: 42.65 KG/M2

## 2019-06-10 DIAGNOSIS — E11.42 DIABETIC POLYNEUROPATHY ASSOCIATED WITH TYPE 2 DIABETES MELLITUS: ICD-10-CM

## 2019-06-10 DIAGNOSIS — G89.29 CHRONIC LOW BACK PAIN, UNSPECIFIED BACK PAIN LATERALITY, WITH SCIATICA PRESENCE UNSPECIFIED: ICD-10-CM

## 2019-06-10 DIAGNOSIS — Z87.898 HISTORY OF ULCERATION: Primary | ICD-10-CM

## 2019-06-10 DIAGNOSIS — I73.9 PVD (PERIPHERAL VASCULAR DISEASE): ICD-10-CM

## 2019-06-10 DIAGNOSIS — M54.5 CHRONIC LOW BACK PAIN, UNSPECIFIED BACK PAIN LATERALITY, WITH SCIATICA PRESENCE UNSPECIFIED: ICD-10-CM

## 2019-06-10 DIAGNOSIS — F41.9 ANXIETY: ICD-10-CM

## 2019-06-10 DIAGNOSIS — M21.621 TAILOR'S BUNION OF RIGHT FOOT: ICD-10-CM

## 2019-06-10 PROCEDURE — 99999 PR PBB SHADOW E&M-EST. PATIENT-LVL III: CPT | Mod: PBBFAC,,, | Performed by: PODIATRIST

## 2019-06-10 PROCEDURE — 1101F PR PT FALLS ASSESS DOC 0-1 FALLS W/OUT INJ PAST YR: ICD-10-PCS | Mod: CPTII,S$GLB,, | Performed by: PODIATRIST

## 2019-06-10 PROCEDURE — 3044F HG A1C LEVEL LT 7.0%: CPT | Mod: CPTII,S$GLB,, | Performed by: PODIATRIST

## 2019-06-10 PROCEDURE — 99212 OFFICE O/P EST SF 10 MIN: CPT | Mod: S$GLB,,, | Performed by: PODIATRIST

## 2019-06-10 PROCEDURE — 99999 PR PBB SHADOW E&M-EST. PATIENT-LVL III: ICD-10-PCS | Mod: PBBFAC,,, | Performed by: PODIATRIST

## 2019-06-10 PROCEDURE — 3044F PR MOST RECENT HEMOGLOBIN A1C LEVEL <7.0%: ICD-10-PCS | Mod: CPTII,S$GLB,, | Performed by: PODIATRIST

## 2019-06-10 PROCEDURE — 99212 PR OFFICE/OUTPT VISIT, EST, LEVL II, 10-19 MIN: ICD-10-PCS | Mod: S$GLB,,, | Performed by: PODIATRIST

## 2019-06-10 PROCEDURE — 1101F PT FALLS ASSESS-DOCD LE1/YR: CPT | Mod: CPTII,S$GLB,, | Performed by: PODIATRIST

## 2019-06-10 NOTE — PROGRESS NOTES
Subjective:      Patient ID: Richard Bustos is a 73 y.o. male.    Chief Complaint: Follow-up (right foot wound)    Richard is a 73 y.o. male who presents to the clinic for evaluation and treatment of high risk feet. Richard has a past medical history of Anemia, Anemia of other chronic disease (9/13/2017), Anemia, chronic renal failure (9/13/2017), Anemia, unspecified (9/13/2017), Anticoagulant long-term use, Aorta aneurysm, Arthritis, Atrial fibrillation, CAD (coronary artery disease), CHF (congestive heart failure), Chronic kidney disease, Clotting disorder (9/13/2017), Colon polyp, Diabetes mellitus, Diabetes mellitus type II, Diverticulosis, Elevated PSA, Encounter for blood transfusion, Former smoker, GERD (gastroesophageal reflux disease), Gout, colonic polyps, Hypercoagulable state, Hyperlipidemia, Hypertension, MI, old (2010), Myocardial infarction, Osteomyelitis of ankle or foot (3/9/2017), Prostate cancer (06/2016), Sleep apnea, Squamous cell carcinoma (01/23/2018), and Venous stasis. The patient's chief complaint is diabetic foot ulcer, right forefoot at 1st mtpj.  Unknown onset  improving since  Aggravated with pressure, walking.  Dressing with aperture, gauze, tape every day.  Ambulating in sx shoe right, DM shoe left .  xrays negative osteolytic changes from prior.    PCP: Familia Ramirez Jr, MD    Date Last Seen by PCP:   Chief Complaint   Patient presents with    Follow-up     right foot wound         Current shoe gear:  Affected Foot: sx shoe right     Unaffected Foot: Rx diabetic extra depth shoes and custom accommodative insoles    History of Trauma: negative  Sign of Infection: none    Hemoglobin A1C   Date Value Ref Range Status   02/11/2019 6.2 (H) 4.0 - 5.6 % Final     Comment:     ADA Screening Guidelines:  5.7-6.4%  Consistent with prediabetes  >or=6.5%  Consistent with diabetes  High levels of fetal hemoglobin interfere with the HbA1C  assay. Heterozygous hemoglobin variants (HbS, HgC, etc)do  not  significantly interfere with this assay.   However, presence of multiple variants may affect accuracy.     10/22/2018 5.9 (H) 4.0 - 5.6 % Final     Comment:     ADA Screening Guidelines:  5.7-6.4%  Consistent with prediabetes  >or=6.5%  Consistent with diabetes  High levels of fetal hemoglobin interfere with the HbA1C  assay. Heterozygous hemoglobin variants (HbS, HgC, etc)do  not significantly interfere with this assay.   However, presence of multiple variants may affect accuracy.     10/07/2018 6.0 (H) 4.0 - 5.6 % Final     Comment:     ADA Screening Guidelines:  5.7-6.4%  Consistent with prediabetes  >or=6.5%  Consistent with diabetes  High levels of fetal hemoglobin interfere with the HbA1C  assay. Heterozygous hemoglobin variants (HbS, HgC, etc)do  not significantly interfere with this assay.   However, presence of multiple variants may affect accuracy.     06/17/2013 6.8 (H) 4.8 - 5.6 % Final     Comment:              Increased risk for diabetes: 5.7 - 6.4           Diabetes: >6.4           Glycemic control for adults with diabetes: <7.0  **In order to standardize %HbA1c results worldwide, as of October 11, 2010,  the %HbA1c is being calculated using the master equation recommended in the  consensus statement adopted by the ADA (American Diabetes Assoc), EASD  (European Assoc for the Study of Diabetes), IFCC (International Federation  of Clinical Chemistry and Laboratory Medicine) and IDF (International  Diabetes Federation). Result units: %HgbA1c (DCCT/NGSP).  In common with other methods, Hb A1C values may not accurately reflect mean  blood glucose in patients with hemoglobin variants (HgbF, HgbS and HgbC).  Any cause of shortened erythrocyte survival will reduce exposure of  erythrocytes to glucose with a consequent decrease in HbA1c (%) values, even  though the time-averaged blood glucose level may be elevated. Causes of  shortened erythrocyte lifetime might be hemolytic anemia or other  hemolytic  diseases, homozygous sickle cell trait, pregnancy, recent significant or  chronic blood loss, etc. Caution should be used when interpreting the HbA1c  results from patients with these conditions.       Review of Systems   Constitution: Negative for chills, diaphoresis, fever, malaise/fatigue and night sweats.   Cardiovascular: Positive for leg swelling. Negative for claudication, cyanosis and syncope.   Skin: Positive for poor wound healing. Negative for color change, dry skin, nail changes, rash, suspicious lesions and unusual hair distribution.   Musculoskeletal: Negative for falls, joint pain, joint swelling, muscle cramps, muscle weakness and stiffness.   Gastrointestinal: Negative for constipation, diarrhea, nausea and vomiting.   Neurological: Positive for numbness, paresthesias and sensory change. Negative for brief paralysis, disturbances in coordination, focal weakness and tremors.           Objective:      Physical Exam   Constitutional: He appears well-developed and well-nourished. He is cooperative. No distress.   Cardiovascular:   Pulses:       Dorsalis pedis pulses are 1+ on the right side, and 1+ on the left side.        Posterior tibial pulses are 1+ on the right side, and 1+ on the left side.   Toes warm, pink, cap fill <5 seconds.   Lymphadenopathy:   Negative lymphadenopathy bilateral popliteal fossa and tarsal tunnel.     Neurological: A sensory deficit is present.   Diminished/loss of protective sensation all toes bilateral to 10 gram monofilament.       Skin: Skin is warm and dry. No abrasion, no bruising, no burn, no ecchymosis, no laceration, no lesion and no rash noted. He is not diaphoretic. No erythema. No pallor.       Wound plantar right 1st mtpj closed today, epithelialized  without ulceration, drainage, pus, tracking, fluctuance, malodor, or cardinal signs infection.                 Assessment:       Encounter Diagnoses   Name Primary?    History of ulceration Yes     Alek's bunion of right foot     Diabetic polyneuropathy associated with type 2 diabetes mellitus     PVD (peripheral vascular disease)          Plan:       Richard was seen today for follow-up.    Diagnoses and all orders for this visit:    History of ulceration  -     ORTHOTIC DEVICE (DME)    Alek's bunion of right foot  -     ORTHOTIC DEVICE (DME)    Diabetic polyneuropathy associated with type 2 diabetes mellitus  -     ORTHOTIC DEVICE (DME)    PVD (peripheral vascular disease)  -     ORTHOTIC DEVICE (DME)      I counseled the patient on his conditions, their implications and medical management.    Inspect feet multiple times daily for signs of occurrence/recurrence ulceration.     Dressed right foot with  wound foam, kerlix - change same every other day-or something lighter, band aid, etc.  Just to protect it.    Rx stretch DM shoe right at lateral right 5th mtpj.    Until modified, continue minimal ambulation in sx shoe right, DM shoe/insert left.    Adequate vitamin supplementation, protein intake, and hydration - discussed with patient.            No follow-ups on file.

## 2019-06-12 ENCOUNTER — TELEPHONE (OUTPATIENT)
Dept: HEMATOLOGY/ONCOLOGY | Facility: CLINIC | Age: 74
End: 2019-06-12

## 2019-06-12 RX ORDER — ALPRAZOLAM 1 MG/1
TABLET ORAL
Qty: 30 TABLET | Refills: 0 | Status: SHIPPED | OUTPATIENT
Start: 2019-06-12 | End: 2019-07-11 | Stop reason: SDUPTHER

## 2019-06-12 RX ORDER — HYDROCODONE BITARTRATE AND ACETAMINOPHEN 7.5; 325 MG/1; MG/1
1 TABLET ORAL EVERY 6 HOURS PRN
Qty: 90 TABLET | Refills: 0 | Status: SHIPPED | OUTPATIENT
Start: 2019-06-12 | End: 2019-07-11 | Stop reason: SDUPTHER

## 2019-06-17 ENCOUNTER — PATIENT MESSAGE (OUTPATIENT)
Dept: NEUROLOGY | Facility: CLINIC | Age: 74
End: 2019-06-17

## 2019-06-17 RX ORDER — ESCITALOPRAM OXALATE 10 MG/1
TABLET ORAL
Qty: 90 TABLET | Refills: 0 | OUTPATIENT
Start: 2019-06-17

## 2019-06-18 ENCOUNTER — OFFICE VISIT (OUTPATIENT)
Dept: NEUROLOGY | Facility: CLINIC | Age: 74
End: 2019-06-18
Payer: MEDICARE

## 2019-06-18 VITALS
WEIGHT: 286 LBS | BODY MASS INDEX: 42.36 KG/M2 | DIASTOLIC BLOOD PRESSURE: 73 MMHG | SYSTOLIC BLOOD PRESSURE: 133 MMHG | HEIGHT: 69 IN | HEART RATE: 56 BPM

## 2019-06-18 DIAGNOSIS — E78.00 PURE HYPERCHOLESTEROLEMIA: ICD-10-CM

## 2019-06-18 DIAGNOSIS — G31.84 MCI (MILD COGNITIVE IMPAIRMENT): Primary | ICD-10-CM

## 2019-06-18 DIAGNOSIS — I25.119 ATHEROSCLEROSIS OF NATIVE CORONARY ARTERY OF NATIVE HEART WITH ANGINA PECTORIS: Chronic | ICD-10-CM

## 2019-06-18 DIAGNOSIS — G47.33 OBSTRUCTIVE SLEEP APNEA SYNDROME: Chronic | ICD-10-CM

## 2019-06-18 DIAGNOSIS — I48.0 PAROXYSMAL ATRIAL FIBRILLATION: ICD-10-CM

## 2019-06-18 DIAGNOSIS — E11.42 DIABETIC POLYNEUROPATHY ASSOCIATED WITH TYPE 2 DIABETES MELLITUS: ICD-10-CM

## 2019-06-18 DIAGNOSIS — N18.30 CKD (CHRONIC KIDNEY DISEASE) STAGE 3, GFR 30-59 ML/MIN: ICD-10-CM

## 2019-06-18 DIAGNOSIS — I73.9 PERIPHERAL VASCULAR DISEASE: ICD-10-CM

## 2019-06-18 DIAGNOSIS — E11.59 HYPERTENSION ASSOCIATED WITH DIABETES: ICD-10-CM

## 2019-06-18 DIAGNOSIS — R41.3 MEMORY LOSS: ICD-10-CM

## 2019-06-18 DIAGNOSIS — F41.9 ANXIETY: ICD-10-CM

## 2019-06-18 DIAGNOSIS — I15.2 HYPERTENSION ASSOCIATED WITH DIABETES: ICD-10-CM

## 2019-06-18 PROCEDURE — 99499 UNLISTED E&M SERVICE: CPT | Mod: S$GLB,,, | Performed by: PSYCHIATRY & NEUROLOGY

## 2019-06-18 PROCEDURE — 99214 PR OFFICE/OUTPT VISIT, EST, LEVL IV, 30-39 MIN: ICD-10-PCS | Mod: S$GLB,,, | Performed by: PSYCHIATRY & NEUROLOGY

## 2019-06-18 PROCEDURE — 3044F HG A1C LEVEL LT 7.0%: CPT | Mod: CPTII,S$GLB,, | Performed by: PSYCHIATRY & NEUROLOGY

## 2019-06-18 PROCEDURE — 99214 OFFICE O/P EST MOD 30 MIN: CPT | Mod: S$GLB,,, | Performed by: PSYCHIATRY & NEUROLOGY

## 2019-06-18 PROCEDURE — 3078F PR MOST RECENT DIASTOLIC BLOOD PRESSURE < 80 MM HG: ICD-10-PCS | Mod: CPTII,S$GLB,, | Performed by: PSYCHIATRY & NEUROLOGY

## 2019-06-18 PROCEDURE — 1101F PR PT FALLS ASSESS DOC 0-1 FALLS W/OUT INJ PAST YR: ICD-10-PCS | Mod: CPTII,S$GLB,, | Performed by: PSYCHIATRY & NEUROLOGY

## 2019-06-18 PROCEDURE — 1101F PT FALLS ASSESS-DOCD LE1/YR: CPT | Mod: CPTII,S$GLB,, | Performed by: PSYCHIATRY & NEUROLOGY

## 2019-06-18 PROCEDURE — 3078F DIAST BP <80 MM HG: CPT | Mod: CPTII,S$GLB,, | Performed by: PSYCHIATRY & NEUROLOGY

## 2019-06-18 PROCEDURE — 99999 PR PBB SHADOW E&M-EST. PATIENT-LVL III: ICD-10-PCS | Mod: PBBFAC,,, | Performed by: PSYCHIATRY & NEUROLOGY

## 2019-06-18 PROCEDURE — 99999 PR PBB SHADOW E&M-EST. PATIENT-LVL III: CPT | Mod: PBBFAC,,, | Performed by: PSYCHIATRY & NEUROLOGY

## 2019-06-18 PROCEDURE — 99499 RISK ADDL DX/OHS AUDIT: ICD-10-PCS | Mod: S$GLB,,, | Performed by: PSYCHIATRY & NEUROLOGY

## 2019-06-18 PROCEDURE — 3075F SYST BP GE 130 - 139MM HG: CPT | Mod: CPTII,S$GLB,, | Performed by: PSYCHIATRY & NEUROLOGY

## 2019-06-18 PROCEDURE — 3044F PR MOST RECENT HEMOGLOBIN A1C LEVEL <7.0%: ICD-10-PCS | Mod: CPTII,S$GLB,, | Performed by: PSYCHIATRY & NEUROLOGY

## 2019-06-18 PROCEDURE — 3075F PR MOST RECENT SYSTOLIC BLOOD PRESS GE 130-139MM HG: ICD-10-PCS | Mod: CPTII,S$GLB,, | Performed by: PSYCHIATRY & NEUROLOGY

## 2019-06-18 RX ORDER — MEMANTINE HYDROCHLORIDE 10 MG/1
10 TABLET ORAL 2 TIMES DAILY
Qty: 60 TABLET | Refills: 11 | Status: SHIPPED | OUTPATIENT
Start: 2019-06-18 | End: 2019-11-22

## 2019-06-18 RX ORDER — ESCITALOPRAM OXALATE 20 MG/1
20 TABLET ORAL DAILY
Qty: 90 TABLET | Refills: 3 | Status: SHIPPED | OUTPATIENT
Start: 2019-06-18 | End: 2019-08-22 | Stop reason: ALTCHOICE

## 2019-06-18 NOTE — PATIENT INSTRUCTIONS
Discussed with patient and family.  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  He has multiple vascular risk factors as noted above.  Control of vascular risk factors specially diabetes is important.  Fall precautions discussed.  Will increase the Namenda to 10 mg twice a day.
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
## 2019-06-21 DIAGNOSIS — I10 ESSENTIAL HYPERTENSION: ICD-10-CM

## 2019-06-21 DIAGNOSIS — D50.9 IRON DEFICIENCY ANEMIA: ICD-10-CM

## 2019-06-21 RX ORDER — LANOLIN ALCOHOL/MO/W.PET/CERES
CREAM (GRAM) TOPICAL
Qty: 90 TABLET | Refills: 3 | Status: SHIPPED | OUTPATIENT
Start: 2019-06-21 | End: 2020-06-24

## 2019-06-21 RX ORDER — FERROUS SULFATE 325(65) MG
TABLET ORAL
Qty: 180 TABLET | Refills: 1 | Status: SHIPPED | OUTPATIENT
Start: 2019-06-21 | End: 2020-02-11 | Stop reason: SDUPTHER

## 2019-06-24 ENCOUNTER — OFFICE VISIT (OUTPATIENT)
Dept: PODIATRY | Facility: CLINIC | Age: 74
End: 2019-06-24
Payer: MEDICARE

## 2019-06-24 VITALS
HEIGHT: 69 IN | SYSTOLIC BLOOD PRESSURE: 127 MMHG | BODY MASS INDEX: 43.36 KG/M2 | DIASTOLIC BLOOD PRESSURE: 63 MMHG | WEIGHT: 292.75 LBS | HEART RATE: 60 BPM

## 2019-06-24 DIAGNOSIS — B35.1 ONYCHOMYCOSIS DUE TO DERMATOPHYTE: ICD-10-CM

## 2019-06-24 DIAGNOSIS — E11.42 DIABETIC POLYNEUROPATHY ASSOCIATED WITH TYPE 2 DIABETES MELLITUS: ICD-10-CM

## 2019-06-24 DIAGNOSIS — Z87.898 HISTORY OF ULCERATION: Primary | ICD-10-CM

## 2019-06-24 DIAGNOSIS — I73.9 PVD (PERIPHERAL VASCULAR DISEASE): ICD-10-CM

## 2019-06-24 PROCEDURE — 99499 NO LOS: ICD-10-PCS | Mod: S$GLB,,, | Performed by: PODIATRIST

## 2019-06-24 PROCEDURE — 11721 PR DEBRIDEMENT OF NAILS, 6 OR MORE: ICD-10-PCS | Mod: Q9,S$GLB,, | Performed by: PODIATRIST

## 2019-06-24 PROCEDURE — 99999 PR PBB SHADOW E&M-EST. PATIENT-LVL V: CPT | Mod: PBBFAC,,, | Performed by: PODIATRIST

## 2019-06-24 PROCEDURE — 99499 UNLISTED E&M SERVICE: CPT | Mod: S$GLB,,, | Performed by: PODIATRIST

## 2019-06-24 PROCEDURE — 11721 DEBRIDE NAIL 6 OR MORE: CPT | Mod: Q9,S$GLB,, | Performed by: PODIATRIST

## 2019-06-24 PROCEDURE — 99999 PR PBB SHADOW E&M-EST. PATIENT-LVL V: ICD-10-PCS | Mod: PBBFAC,,, | Performed by: PODIATRIST

## 2019-06-24 NOTE — PROGRESS NOTES
Subjective:      Patient ID: Richard Bustos is a 73 y.o. male.    Chief Complaint: Follow-up (right foot)    Richard is a 73 y.o. male who presents to the clinic for evaluation and treatment of high risk feet. Richard has a past medical history of Anemia, Anemia of other chronic disease (9/13/2017), Anemia, chronic renal failure (9/13/2017), Anemia, unspecified (9/13/2017), Anticoagulant long-term use, Aorta aneurysm, Arthritis, Atrial fibrillation, CAD (coronary artery disease), CHF (congestive heart failure), Chronic kidney disease, Clotting disorder (9/13/2017), Colon polyp, Diabetes mellitus, Diabetes mellitus type II, Diverticulosis, Elevated PSA, Encounter for blood transfusion, Former smoker, GERD (gastroesophageal reflux disease), Gout, colonic polyps, Hypercoagulable state, Hyperlipidemia, Hypertension, MI, old (2010), Myocardial infarction, Osteomyelitis of ankle or foot (3/9/2017), Prostate cancer (06/2016), Sleep apnea, Squamous cell carcinoma (01/23/2018), and Venous stasis. The patient's chief complaint is diabetic foot ulcer, right forefoot at 1st mtpj.  Unknown onset  improving since  Aggravated with pressure, walking.  Dressing with aperture, gauze, tape every day.  Ambulating in sx shoe right, DM shoe left .  xrays negative osteolytic changes from prior.    CC2 thick long discolored toenails all toes.  Gradual onset, worsening over past several weeks, aggravated by increased weight bearing, shoe gear, pressure.  Periodic debridement helps symptoms.     PCP: Familia Ramirez Jr, MD    Date Last Seen by PCP:   Chief Complaint   Patient presents with    Follow-up     right foot         Current shoe gear:  Affected Foot: sx shoe right     Unaffected Foot: Rx diabetic extra depth shoes and custom accommodative insoles    History of Trauma: negative  Sign of Infection: none    Hemoglobin A1C   Date Value Ref Range Status   02/11/2019 6.2 (H) 4.0 - 5.6 % Final     Comment:     ADA Screening Guidelines:  5.7-6.4%   Consistent with prediabetes  >or=6.5%  Consistent with diabetes  High levels of fetal hemoglobin interfere with the HbA1C  assay. Heterozygous hemoglobin variants (HbS, HgC, etc)do  not significantly interfere with this assay.   However, presence of multiple variants may affect accuracy.     10/22/2018 5.9 (H) 4.0 - 5.6 % Final     Comment:     ADA Screening Guidelines:  5.7-6.4%  Consistent with prediabetes  >or=6.5%  Consistent with diabetes  High levels of fetal hemoglobin interfere with the HbA1C  assay. Heterozygous hemoglobin variants (HbS, HgC, etc)do  not significantly interfere with this assay.   However, presence of multiple variants may affect accuracy.     10/07/2018 6.0 (H) 4.0 - 5.6 % Final     Comment:     ADA Screening Guidelines:  5.7-6.4%  Consistent with prediabetes  >or=6.5%  Consistent with diabetes  High levels of fetal hemoglobin interfere with the HbA1C  assay. Heterozygous hemoglobin variants (HbS, HgC, etc)do  not significantly interfere with this assay.   However, presence of multiple variants may affect accuracy.     06/17/2013 6.8 (H) 4.8 - 5.6 % Final     Comment:              Increased risk for diabetes: 5.7 - 6.4           Diabetes: >6.4           Glycemic control for adults with diabetes: <7.0  **In order to standardize %HbA1c results worldwide, as of October 11, 2010,  the %HbA1c is being calculated using the master equation recommended in the  consensus statement adopted by the ADA (American Diabetes Assoc), EASD  (European Assoc for the Study of Diabetes), IFCC (International Federation  of Clinical Chemistry and Laboratory Medicine) and IDF (International  Diabetes Federation). Result units: %HgbA1c (DCCT/NGSP).  In common with other methods, Hb A1C values may not accurately reflect mean  blood glucose in patients with hemoglobin variants (HgbF, HgbS and HgbC).  Any cause of shortened erythrocyte survival will reduce exposure of  erythrocytes to glucose with a consequent  decrease in HbA1c (%) values, even  though the time-averaged blood glucose level may be elevated. Causes of  shortened erythrocyte lifetime might be hemolytic anemia or other hemolytic  diseases, homozygous sickle cell trait, pregnancy, recent significant or  chronic blood loss, etc. Caution should be used when interpreting the HbA1c  results from patients with these conditions.       Review of Systems   Constitution: Negative for chills, diaphoresis, fever, malaise/fatigue and night sweats.   Cardiovascular: Positive for leg swelling. Negative for claudication, cyanosis and syncope.   Skin: Positive for nail changes and poor wound healing. Negative for color change, dry skin, rash, suspicious lesions and unusual hair distribution.   Musculoskeletal: Negative for falls, joint pain, joint swelling, muscle cramps, muscle weakness and stiffness.   Gastrointestinal: Negative for constipation, diarrhea, nausea and vomiting.   Neurological: Positive for numbness, paresthesias and sensory change. Negative for brief paralysis, disturbances in coordination, focal weakness and tremors.           Objective:      Physical Exam   Constitutional: He appears well-developed and well-nourished. He is cooperative. No distress.   Cardiovascular:   Pulses:       Dorsalis pedis pulses are 1+ on the right side, and 1+ on the left side.        Posterior tibial pulses are 1+ on the right side, and 1+ on the left side.   Toes warm, pink, cap fill <5 seconds.    <2+ pittting edema bilateral feet   Musculoskeletal:   Tailor bunion present 5th mtpj right more than left with medial deviation of 5th toe, prominent bony bump lateral 5th mtpj, and pain to palpation without evidence of trauma or infection.    Otherwise,  All ten toes without clubbing, cyanosis, or signs of ischemia.  No pain to palpation bilateral lower extremities.  Range of motion, stability, muscle strength, and muscle tone normal bilateral feet and legs.    Lymphadenopathy:    Negative lymphadenopathy bilateral popliteal fossa and tarsal tunnel.     Neurological: A sensory deficit is present.   Diminished/loss of protective sensation all toes bilateral to 10 gram monofilament.    Paresthesias,  bilateral feet with no clearly identified trigger or source.    Skin: Skin is warm and dry. No abrasion, no bruising, no burn, no ecchymosis, no laceration, no lesion and no rash noted. He is not diaphoretic. No erythema. No pallor.       Wound plantar right 1st mtpj remains closed today, epithelialized  without ulceration, drainage, pus, tracking, fluctuance, malodor, or cardinal signs infection.    Otherwise, Skin thin, shiny, atrophic, with decreased density and distribution of pedal hair bilateral, but without , minerva discoloration,  ulcers, masses, nodules or cords palpated bilateral feet and legs.    Mottled hyperpigmentation pretibial surfaces bilateral.    Toenails 1st, 2nd, 3rd, 4th, 5th  bilateral are hypertrophic thickened 2-3 mm, dystrophic, discolored tanish brown with tan, gray crumbly subungual debris.  Tender to distal nail plate pressure, without periungual skin abnormality of each.               Assessment:       Encounter Diagnoses   Name Primary?    History of ulceration Yes    Diabetic polyneuropathy associated with type 2 diabetes mellitus     PVD (peripheral vascular disease)     Onychomycosis due to dermatophyte          Plan:       Richard was seen today for follow-up.    Diagnoses and all orders for this visit:    History of ulceration    Diabetic polyneuropathy associated with type 2 diabetes mellitus    PVD (peripheral vascular disease)    Onychomycosis due to dermatophyte      I counseled the patient on his conditions, their implications and medical management.    Inspect feet multiple times daily for signs of occurrence/recurrence ulceration.     Continue DM shoex/inserts/newly stretched.    Adequate vitamin supplementation, protein intake, and hydration - discussed  with patient.    Discussed conservative treatment with shoes of adequate dimensions, material, and style to alleviate symptoms and delay or prevent surgical intervention.     With the patient's permission, I debrided all ten toenails with a sterile nipper and curette, removing all offending nail and debris.  Patient tolerated the procedure well and related significant relief.    Continue penlac.- declines refill.        Follow up if symptoms worsen or fail to improve.

## 2019-07-08 RX ORDER — ALENDRONATE SODIUM AND CHOLECALCIFEROL 70; 2800 MG/1; [IU]/1
TABLET ORAL
Qty: 4 TABLET | Refills: 0 | OUTPATIENT
Start: 2019-07-08

## 2019-07-11 ENCOUNTER — PATIENT MESSAGE (OUTPATIENT)
Dept: UROLOGY | Facility: CLINIC | Age: 74
End: 2019-07-11

## 2019-07-11 DIAGNOSIS — G89.29 CHRONIC LOW BACK PAIN, UNSPECIFIED BACK PAIN LATERALITY, WITH SCIATICA PRESENCE UNSPECIFIED: ICD-10-CM

## 2019-07-11 DIAGNOSIS — F41.9 ANXIETY: ICD-10-CM

## 2019-07-11 DIAGNOSIS — M54.5 CHRONIC LOW BACK PAIN, UNSPECIFIED BACK PAIN LATERALITY, WITH SCIATICA PRESENCE UNSPECIFIED: ICD-10-CM

## 2019-07-11 RX ORDER — HYDROCODONE BITARTRATE AND ACETAMINOPHEN 7.5; 325 MG/1; MG/1
1 TABLET ORAL EVERY 6 HOURS PRN
Qty: 90 TABLET | Refills: 0 | Status: SHIPPED | OUTPATIENT
Start: 2019-07-12 | End: 2019-07-15 | Stop reason: SDUPTHER

## 2019-07-11 RX ORDER — ALPRAZOLAM 1 MG/1
TABLET ORAL
Qty: 30 TABLET | Refills: 0 | Status: SHIPPED | OUTPATIENT
Start: 2019-07-12 | End: 2019-07-15 | Stop reason: SDUPTHER

## 2019-07-15 ENCOUNTER — TELEPHONE (OUTPATIENT)
Dept: HEMATOLOGY/ONCOLOGY | Facility: CLINIC | Age: 74
End: 2019-07-15

## 2019-07-15 DIAGNOSIS — F41.9 ANXIETY: ICD-10-CM

## 2019-07-15 DIAGNOSIS — M54.5 CHRONIC LOW BACK PAIN, UNSPECIFIED BACK PAIN LATERALITY, WITH SCIATICA PRESENCE UNSPECIFIED: ICD-10-CM

## 2019-07-15 DIAGNOSIS — G89.29 CHRONIC LOW BACK PAIN, UNSPECIFIED BACK PAIN LATERALITY, WITH SCIATICA PRESENCE UNSPECIFIED: ICD-10-CM

## 2019-07-15 RX ORDER — HYDROCODONE BITARTRATE AND ACETAMINOPHEN 7.5; 325 MG/1; MG/1
1 TABLET ORAL EVERY 6 HOURS PRN
Qty: 90 TABLET | Refills: 0 | Status: SHIPPED | OUTPATIENT
Start: 2019-07-15 | End: 2019-08-12 | Stop reason: SDUPTHER

## 2019-07-15 RX ORDER — ALPRAZOLAM 1 MG/1
TABLET ORAL
Qty: 30 TABLET | Refills: 0 | Status: SHIPPED | OUTPATIENT
Start: 2019-07-15 | End: 2019-08-12 | Stop reason: SDUPTHER

## 2019-07-15 NOTE — TELEPHONE ENCOUNTER
Labs faxed to PCP      ----- Message from Kai Ortiz MD sent at 7/15/2019  1:21 PM CDT -----  Forward these labs to the attention of his PCP      ----- Message -----  From: Scott Adames MD  Sent: 7/3/2019   6:21 AM  To: Kai Ortiz MD    [facility]

## 2019-07-15 NOTE — TELEPHONE ENCOUNTER
----- Message from Pam Baxter sent at 7/15/2019  2:52 PM CDT -----  Contact: Citlali daughter  Type:  RX Refill Request    Who Called:  Citlali  Refill or New Rx:  refill  RX Name and Strength:  ALPRAZolam (XANAX) 1 MG tablet,  HYDROcodone-acetaminophen (NORCO) 7.5-325 mg per tablet   How is the patient currently taking it? (ex. 1XDay):  As needed  Is this a 30 day or 90 day RX:  30  Preferred Pharmacy with phone number:    [id number] - [place] - [address] [facility] [address] [address]  Phone: [phone] Fax: [phone]      Local or Mail Order:  local  Ordering Provider:  Ashley  Best Call Back Number:  [phone]  Additional Information:  Pls call Citlali regarding the rx's. [facility] adv'd they did not receive the scripts.

## 2019-07-17 ENCOUNTER — OFFICE VISIT (OUTPATIENT)
Dept: FAMILY MEDICINE | Facility: CLINIC | Age: 74
End: 2019-07-17
Payer: MEDICARE

## 2019-07-17 VITALS
OXYGEN SATURATION: 95 % | HEIGHT: 69 IN | WEIGHT: 288.56 LBS | HEART RATE: 75 BPM | SYSTOLIC BLOOD PRESSURE: 122 MMHG | DIASTOLIC BLOOD PRESSURE: 74 MMHG | BODY MASS INDEX: 42.74 KG/M2 | TEMPERATURE: 98 F

## 2019-07-17 DIAGNOSIS — M62.838 MUSCLE SPASM OF RIGHT LOWER EXTREMITY: Primary | ICD-10-CM

## 2019-07-17 PROCEDURE — 3074F SYST BP LT 130 MM HG: CPT | Mod: CPTII,S$GLB,, | Performed by: FAMILY MEDICINE

## 2019-07-17 PROCEDURE — 3078F PR MOST RECENT DIASTOLIC BLOOD PRESSURE < 80 MM HG: ICD-10-PCS | Mod: CPTII,S$GLB,, | Performed by: FAMILY MEDICINE

## 2019-07-17 PROCEDURE — 1101F PR PT FALLS ASSESS DOC 0-1 FALLS W/OUT INJ PAST YR: ICD-10-PCS | Mod: CPTII,S$GLB,, | Performed by: FAMILY MEDICINE

## 2019-07-17 PROCEDURE — 3074F PR MOST RECENT SYSTOLIC BLOOD PRESSURE < 130 MM HG: ICD-10-PCS | Mod: CPTII,S$GLB,, | Performed by: FAMILY MEDICINE

## 2019-07-17 PROCEDURE — 99214 PR OFFICE/OUTPT VISIT, EST, LEVL IV, 30-39 MIN: ICD-10-PCS | Mod: S$GLB,,, | Performed by: FAMILY MEDICINE

## 2019-07-17 PROCEDURE — 1101F PT FALLS ASSESS-DOCD LE1/YR: CPT | Mod: CPTII,S$GLB,, | Performed by: FAMILY MEDICINE

## 2019-07-17 PROCEDURE — 3078F DIAST BP <80 MM HG: CPT | Mod: CPTII,S$GLB,, | Performed by: FAMILY MEDICINE

## 2019-07-17 PROCEDURE — 99999 PR PBB SHADOW E&M-EST. PATIENT-LVL IV: CPT | Mod: PBBFAC,,, | Performed by: FAMILY MEDICINE

## 2019-07-17 PROCEDURE — 99214 OFFICE O/P EST MOD 30 MIN: CPT | Mod: S$GLB,,, | Performed by: FAMILY MEDICINE

## 2019-07-17 PROCEDURE — 99999 PR PBB SHADOW E&M-EST. PATIENT-LVL IV: ICD-10-PCS | Mod: PBBFAC,,, | Performed by: FAMILY MEDICINE

## 2019-07-17 RX ORDER — BACLOFEN 10 MG/1
10 TABLET ORAL 2 TIMES DAILY
Qty: 60 TABLET | Refills: 11 | Status: SHIPPED | OUTPATIENT
Start: 2019-07-17 | End: 2020-05-20

## 2019-07-17 NOTE — PROGRESS NOTES
Subjective:       Patient ID: Richard Bustos is a 73 y.o. male.    Chief Complaint: Leg Pain    Patient here for UC visit.    Leg Pain    There was no injury mechanism. The pain is present in the right thigh. The quality of the pain is described as cramping. The pain is moderate. The pain has been intermittent since onset. Pertinent negatives include no muscle weakness or numbness. Exacerbated by: sitting. Treatments tried: standing/stretching.     Review of Systems   Constitutional: Negative for fever.   Respiratory: Negative for shortness of breath.    Cardiovascular: Negative for chest pain.   Gastrointestinal: Negative for abdominal pain and nausea.   Skin: Negative for rash.   Neurological: Negative for numbness.   All other systems reviewed and are negative.      Objective:      Physical Exam   Constitutional: He appears well-developed. No distress.   Cardiovascular: Normal rate and regular rhythm.   No murmur heard.  Pulmonary/Chest: Effort normal and breath sounds normal.   Musculoskeletal:        Right upper leg: He exhibits no tenderness.       Assessment:       1. Muscle spasm of right lower extremity        Plan:       Muscle spasm of right lower extremity  -     baclofen (LIORESAL) 10 MG tablet; Take 1 tablet (10 mg total) by mouth 2 (two) times daily. For muscle spasms  Dispense: 60 tablet; Refill: 11    Recent labs, 7/10, - CRF; normal K. Mag and Phosphourus    Patient Instructions   Water intake goal is 120 ounces - about 7 1/2 bottles a day.

## 2019-07-23 DIAGNOSIS — I48.0 PAROXYSMAL ATRIAL FIBRILLATION: Primary | ICD-10-CM

## 2019-07-25 ENCOUNTER — OFFICE VISIT (OUTPATIENT)
Dept: CARDIOLOGY | Facility: CLINIC | Age: 74
End: 2019-07-25
Payer: MEDICARE

## 2019-07-25 VITALS
SYSTOLIC BLOOD PRESSURE: 131 MMHG | WEIGHT: 289.88 LBS | DIASTOLIC BLOOD PRESSURE: 63 MMHG | OXYGEN SATURATION: 92 % | HEART RATE: 59 BPM | HEIGHT: 69 IN | BODY MASS INDEX: 42.94 KG/M2

## 2019-07-25 DIAGNOSIS — E11.22 TYPE 2 DIABETES MELLITUS WITH STAGE 3 CHRONIC KIDNEY DISEASE, WITHOUT LONG-TERM CURRENT USE OF INSULIN: Chronic | ICD-10-CM

## 2019-07-25 DIAGNOSIS — I51.9 LV DYSFUNCTION: ICD-10-CM

## 2019-07-25 DIAGNOSIS — I25.2 HISTORY OF MI (MYOCARDIAL INFARCTION): ICD-10-CM

## 2019-07-25 DIAGNOSIS — I71.20 THORACIC AORTIC ANEURYSM WITHOUT RUPTURE: Chronic | ICD-10-CM

## 2019-07-25 DIAGNOSIS — Z95.5 STATUS POST INSERTION OF DRUG ELUTING CORONARY ARTERY STENT: Primary | ICD-10-CM

## 2019-07-25 DIAGNOSIS — E66.01 MORBID OBESITY: ICD-10-CM

## 2019-07-25 DIAGNOSIS — G89.29 CHRONIC LOW BACK PAIN, UNSPECIFIED BACK PAIN LATERALITY, WITH SCIATICA PRESENCE UNSPECIFIED: ICD-10-CM

## 2019-07-25 DIAGNOSIS — I10 HYPERTENSION, UNSPECIFIED TYPE: Primary | ICD-10-CM

## 2019-07-25 DIAGNOSIS — Z79.01 LONG TERM (CURRENT) USE OF ANTICOAGULANTS: ICD-10-CM

## 2019-07-25 DIAGNOSIS — I48.0 PAROXYSMAL ATRIAL FIBRILLATION: ICD-10-CM

## 2019-07-25 DIAGNOSIS — I10 HYPERTENSION, UNSPECIFIED TYPE: ICD-10-CM

## 2019-07-25 DIAGNOSIS — D68.59 HYPERCOAGULABLE STATE: ICD-10-CM

## 2019-07-25 DIAGNOSIS — E78.00 PURE HYPERCHOLESTEROLEMIA: ICD-10-CM

## 2019-07-25 DIAGNOSIS — I11.9 HYPERTENSIVE LEFT VENTRICULAR HYPERTROPHY, WITHOUT HEART FAILURE: ICD-10-CM

## 2019-07-25 DIAGNOSIS — Z91.89 SEDENTARY LIFESTYLE: ICD-10-CM

## 2019-07-25 DIAGNOSIS — M54.5 CHRONIC LOW BACK PAIN, UNSPECIFIED BACK PAIN LATERALITY, WITH SCIATICA PRESENCE UNSPECIFIED: ICD-10-CM

## 2019-07-25 DIAGNOSIS — R29.898 WEAKNESS OF BOTH LOWER EXTREMITIES: ICD-10-CM

## 2019-07-25 DIAGNOSIS — G47.33 OBSTRUCTIVE SLEEP APNEA SYNDROME: Chronic | ICD-10-CM

## 2019-07-25 DIAGNOSIS — N18.30 TYPE 2 DIABETES MELLITUS WITH STAGE 3 CHRONIC KIDNEY DISEASE, WITHOUT LONG-TERM CURRENT USE OF INSULIN: Chronic | ICD-10-CM

## 2019-07-25 DIAGNOSIS — I73.9 PERIPHERAL VASCULAR DISEASE: ICD-10-CM

## 2019-07-25 DIAGNOSIS — N18.30 CKD (CHRONIC KIDNEY DISEASE) STAGE 3, GFR 30-59 ML/MIN: ICD-10-CM

## 2019-07-25 PROCEDURE — 99499 RISK ADDL DX/OHS AUDIT: ICD-10-PCS | Mod: S$GLB,,, | Performed by: INTERNAL MEDICINE

## 2019-07-25 PROCEDURE — 3075F SYST BP GE 130 - 139MM HG: CPT | Mod: CPTII,S$GLB,, | Performed by: INTERNAL MEDICINE

## 2019-07-25 PROCEDURE — 3078F PR MOST RECENT DIASTOLIC BLOOD PRESSURE < 80 MM HG: ICD-10-PCS | Mod: CPTII,S$GLB,, | Performed by: INTERNAL MEDICINE

## 2019-07-25 PROCEDURE — 3044F HG A1C LEVEL LT 7.0%: CPT | Mod: CPTII,S$GLB,, | Performed by: INTERNAL MEDICINE

## 2019-07-25 PROCEDURE — 3078F DIAST BP <80 MM HG: CPT | Mod: CPTII,S$GLB,, | Performed by: INTERNAL MEDICINE

## 2019-07-25 PROCEDURE — 99215 OFFICE O/P EST HI 40 MIN: CPT | Mod: S$GLB,,, | Performed by: INTERNAL MEDICINE

## 2019-07-25 PROCEDURE — 93000 ELECTROCARDIOGRAM COMPLETE: CPT | Mod: S$GLB,,, | Performed by: INTERNAL MEDICINE

## 2019-07-25 PROCEDURE — 99999 PR PBB SHADOW E&M-EST. PATIENT-LVL III: ICD-10-PCS | Mod: PBBFAC,,, | Performed by: INTERNAL MEDICINE

## 2019-07-25 PROCEDURE — 99215 PR OFFICE/OUTPT VISIT, EST, LEVL V, 40-54 MIN: ICD-10-PCS | Mod: S$GLB,,, | Performed by: INTERNAL MEDICINE

## 2019-07-25 PROCEDURE — 3044F PR MOST RECENT HEMOGLOBIN A1C LEVEL <7.0%: ICD-10-PCS | Mod: CPTII,S$GLB,, | Performed by: INTERNAL MEDICINE

## 2019-07-25 PROCEDURE — 93000 EKG 12-LEAD: ICD-10-PCS | Mod: S$GLB,,, | Performed by: INTERNAL MEDICINE

## 2019-07-25 PROCEDURE — 1101F PR PT FALLS ASSESS DOC 0-1 FALLS W/OUT INJ PAST YR: ICD-10-PCS | Mod: CPTII,S$GLB,, | Performed by: INTERNAL MEDICINE

## 2019-07-25 PROCEDURE — 3075F PR MOST RECENT SYSTOLIC BLOOD PRESS GE 130-139MM HG: ICD-10-PCS | Mod: CPTII,S$GLB,, | Performed by: INTERNAL MEDICINE

## 2019-07-25 PROCEDURE — 99499 UNLISTED E&M SERVICE: CPT | Mod: S$GLB,,, | Performed by: INTERNAL MEDICINE

## 2019-07-25 PROCEDURE — 99999 PR PBB SHADOW E&M-EST. PATIENT-LVL III: CPT | Mod: PBBFAC,,, | Performed by: INTERNAL MEDICINE

## 2019-07-25 PROCEDURE — 1101F PT FALLS ASSESS-DOCD LE1/YR: CPT | Mod: CPTII,S$GLB,, | Performed by: INTERNAL MEDICINE

## 2019-07-25 NOTE — PROGRESS NOTES
Patient ID: Richard Bustos is a 69 y.o. male who presents for follow-up of Follow-up  For CAD, chronic fatigue, 6 months follow up  PCP: Dr. Ramirez, see every 4 months, does labs through [facility] including lipid  Hematologist: Dr. Quezada  Urologist: Dr. Moss  Podiatrist: Dr. Nunes, Amy  Wound care: Dr. Torrez  Pain: Dr. 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, officially retired, still manages 6 rentals, lots of emotional stress, just loss girlfriend, and puppy 10 years    Health literacy: medium   Activities: mostly sedentary  Nicotine: quit at age 19 after 4 packs  Alcohol: none  Illicit drugs: none  Cardiac symptoms: none  Home BP: 137/60  Medication compliance: yes  Diet: regular  Caffeine: 2 cpd  Labs: 8/2018, LDL 50.8, on Rx, 7/2019 CMP (Cr 1.8, eGFR 37)  Last Echo: 8/2018  Last stress test: 1/2017, lexiscan  Cardiovascular angiogram: 1/2015 with PCI  ECG: SB with PAC, 56, RBBB  Fundoscopic exam: within the past year, negative for HTN changes    In 2/2016:  White male with multiple medical problems (see List). Suffered a NSTEMI in 8/2010, catheterization showed SUMMARY:   1. Three vessel coronary artery disease.   2. Successful PCI. Involved 2 JAYCEE in the LAD and OM1.    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 Off diet at times, had underwent gastri bypass in 2008.    Last Echo in 2010 showed CONCLUSIONS   1 - Mild left ventricular enlargement.   2 - Normal left ventricular function (EF 58%).   3 - Diastolic dysfunction.   4 - Biatrial enlargement.   5 - Mildly to moderately enlarged ascending aorta.    Last carotid US: 9/13/2010  Bilateral 20%-40% narrowing.  No symptoms.    Coronary Artery  "Disease  Presents for follow-up visit. Pertinent negatives include no chest pain, dizziness, leg swelling, muscle weakness, palpitations, shortness of breath or weight gain. Risk factors include hyperlipidemia. The symptoms have been resolved. Compliance with diet is variable. Compliance with exercise is good. Compliance with medications is good.   Hyperlipidemia  Pertinent negatives include no chest pain, focal weakness, myalgias or shortness of breath.     EKG shows sinus bradycardia, rate 45, today NSR, 75, VPC, and nonspecific STTW abnormalities.     Since visit of 7/2012, have lots of stress with tenants, BP noted to be elevated, log reviewed 153-174/, HR 52-74. Had cardiac testing -   ECHO CONCLUSIONS 7/2012   1 - Mild left ventricular enlargement.   2 - Mildly to moderately depressed left ventricular function (EF 40%).   3 - Eccentric hypertrophy.   4 - Mildly to moderately enlarged ascending aorta.   5 - Mild left atrial enlargement.   6 - Normal diastolic function.   7 - Mild aortic regurgitation.   8 - Suggestion for inferoposterior hypokinesia.   9 - Compare to 8/17/2010, there is increase in LVH with decrease in   LVEF from 58%, and new inferoposterior hypokinesia. The Aortic dimensions   have not changed.    Carotid US, 7/2012  IMPRESSION   Findings consistent with less than 50% diameter stenosis of proximal   internal carotid arteries by NASCAET criteria. Flow in the vertebral   arteries appears antegrade bilaterally.    Since visit of 1/23, still with occasional angina, average once a week, lasting about a minute, "light" grade 3-4/10.  Workup:  ECHO, 2/5/20163, CONCLUSIONS   1 - Mild left ventricular enlargement.   2 - Mildly to moderately depressed left ventricular function (EF 40%).   3 - Normal diastolic function.   4 - Inferoposterior hypokinesia consistent with ischemic disease..   5 - Moderately enlarged ascending aorta. 4.4 cm - stable   6 - No significant change from study of " 7/17/2012.    Lexiscan, 2/5/2013  Nuclear Quantitative Functional Analysis:   LVEF: 34 % (normal is 47 - 59)   LVED Volume: 131 ml (normal is 91 - 155)   LVES Volume: 87 ml (normal is 40 - 78)   Impression: ABNORMAL MYOCARDIAL PERFUSION   1. There is evidence for mild to moderate myocardial ischemia in the   lateral apical wall of the left ventricle with associated stress induced   LV cavity dilatation with underlying injury present.   2. There is moderate intensity fixed defect in the inferolateral wall of   the left ventricle, consistent with myocardial injury. There is trivial   francine-injury ischemia.   3. There is abnormal wall motion at rest showing akinesis of the lateral   wall of the left ventricle, moderate global hypokinesis of the left   ventricle. dyskinesis of the inferolateral wall of the left ventricle.   4. There is resting LV dysfunction with a reduced ejection fraction of 34   %. (normal is 47 - 59)   5. The left ventricular volume is mildly increased at rest.   6. The extracardiac distribution of radioactivity is normal.  7. [facility] SSS =15, SRS =10, SDS =5, suggest 6.25% of myocardium may be   ischemic.    Since visit of 2/8, noticed some bilateral leg weakness, post bilateral THR, also occasional charley horse at night, mostly right sided. Today had mild chest pain, grade 2/10, while driving here. Claims to have not heard from [facility], INR on 2/18 was 1.8. Question about Plavix answered. Discussed with daughter, Citlali, over the telephone.     Since visit 2/22, no new problem. Discussed aortic aneurysm, will need at least annual imaging, do not recall the last time. Have claustrophobia requesting Xanax. No problem with the medications, stays very active. Weight reviewed was below 290 lbs in 12/2011.   Apparently likes [employer] [place]!     Since visit of 3/16, had MRA done with use of Xanax and hydrocodone,   Result - Max ascending aorta 4.3 cm stable.  Tolerating medications without  problem, using BiPAP nightly, still lot of stress with Rental Properties. Discuss need for exercise program with weigh reduction of 10%. New problem include left leg weakness with localized pain behind the knee. For about 2 weeks, been dancing with new woman.    Since visit of 4/11/2013, hospitalized 2 weeks ago at [facility] for large hematoma due to use of Lovenox as bridge for oral surgery. Coumadin on hold, seems to be getting smaller and softer. Denies any heart symptoms or CP nor SOB. No problem with taking medications and using CPAP. Slowed down due to leg pain and fatigue. Feels good in the AM.    Since visit of 4/9/2014, complain of leg weakness, noted since 2/2013. Denies any CP nor SOB. Miss 2-3 doses of medications monthly. No other problem with medications. Awaken all night due to dog, sleep 10-4. Feels tired in the morning despite using CPAP. Have not had much blood work done. ECG shows AF rate 67, RBBB.  ECHO in 5/2014 CONCLUSIONS   1 - Enlarged aortic root. measuring 3.9 cm at sinotubular junction.  2 - Biatrial enlargement.   3 - Eccentric hypertrophy.   4 - Mildly to moderately depressed left ventricular systolic function (EF 40-45%).   5 - Normal left ventricular diastolic function.   6 - Right ventricular enlargement with normal systolic function.   7 - No significant change from echo on 2/5/2013..   8 - Difficult apical window, Optison contrast used for wall motion analysis..     Since visit of 11/21, no new problem. Some concern of HR down to 40 after exercise. Noted easy fatigue but remains quite sedentary. Discussed need for Coreg titration for LV dysfunction. Labs reviewed, CKD eith eGFR of 47, mild anemia Hgb 12.1, , A1C 4.7, and proteinuria.   Cardiac workup:  Carotid US, 12/2014 - CONCLUSIONS   There is 0 - 19% right Internal Carotid stenosis.  There is 0 - 19% left Internal Carotid stenosis.    Clean vessels    Lexiscan Nuclear Quantitative Functional Analysis:   LVEF:  38 % (normal is 47 - 59)  LVED Volume: 172 ml (normal is 91 - 155)  LVES Volume: 107 ml (normal is 40 - 78)    Impression: ABNORMAL MYOCARDIAL PERFUSION  1. There is evidence for mild to moderate myocardial ischemia in the inferior and lateral walls of the left ventricle with associated stress induced LV cavity dilatation with underlying injury present.   2. There is moderate to severe intensity fixed defect in the inferolateral wall of the left ventricle, consistent with myocardial injury.   3. There is abnormal wall motion at rest showing moderate global hypokinesis of the left ventricle. severe hypokinesis of the inferior and septal walls of the left ventricle.   4. There is resting LV dysfunction with a reduced ejection fraction of 38 %. (normal is 47 - 59)  5. The left ventricular volume is moderately increased at rest.   6. The extracardiac distribution of radioactivity is normal.   7. When compared to the previous study from 02/05/2013, no significant changes noted.  8. [facility] SSS=15, SRS=8, and SDS=7, suggest that 8.7% of myocardium may be ischemic.    Holter, 12/2014:  PREDOMINANT RHYTHM  1. Sinus arrhythmia with heart rates varying between 41 and 110 bpm with an average of 64 bpm.     VENTRICULAR ARRHYTHMIAS  1. There were frequent mostly monomorphic PVCs totalling 1297 and averaging 54 per hour. There was 1 couplet.    2. 1.6% of complexes.    3. There were no episodes of ventricular tachycardia.    SUPRA VENTRICULAR ARRHYTHMIAS  1. There were rare PACs totalling 108 and averaging 4 per hour.     2. There were no episodes of sustained supraventricular tachycardia.    SINUS NODE FUNCTION  1. There was no evidence of high grade SA sang block.     AV CONDUCTION  1. There was no evidence of high grade AV block.     DIARY  1. The diary was not returned    MISCELLANEOUS  1. There were persistent hookup related artifacts. Overall, the study was of good quality.     2. This was a tape of adequate length (24  hrs).     Since visit of 12/23/2014, underwent C with PCI of RCA. Off ASA due to rectal bleed. Discuss use of triple Rx for hopefully 3 months post JAYCEE implant. Feeling more energetic, no CP nor SOB. Discussed ascending Aortic aneurysm, last checked in 5/2014 with Echo - stable.   HEMODYNAMIC RESULTS:    LVEDP (Pre): 16 mmHg  LVEDP (Post): 18 mmHg  Ejection Fraction: 40%  Global LV Function: moderately depressed  BP: 109/70  HR: 96    Air rest:  LVEDP: 18    C. ANGIOGRAPHIC RESULTS:    DIAGNOSTIC:  Patient has a right dominant coronary artery.     - Left Main Coronary Artery:  The LM is normal. There is DAREK 3 flow.    - Left Anterior Descending Artery:  The mid LAD has a 50% stenosis. There is DAREK 3 flow. The remaining portion of the vessel has multiple lesions < 50% stenosed. Long stent noted in mid-LAD, 50% ISR.    - Left Circumflex Artery:  The LCX has luminal irregularities. There is DAREK 3 flow.    - Right Coronary Artery:  The mid RCA has a 75% stenosis. There is DAREK 3 flow. The remaining portion of the vessel has luminal irregularities. Appears to be close to distal end of previously placed stent.    - Aortic Root:  The Aortic Root is normal. There is DAREK 3 flow.      D. SUMMARY:    1. Two vessel coronary artery disease.  2. Diastolic dysfunction.  3. - Very difficult case due to tortuosity of the innominate artery, multiple manipulation needed for cannulation of CA.  4. - RCA lesion appears as a large plaque with significant luminal stenois.    E. RECOMMENDATIONS:    1. Continue medical management.  2. Cardiac rehab referral.  3. Weight loss.  4. Follow up with Dr. Familia Tate.  5. Schedule for PCI.  6. - Hydration overnight, eGFR 47.  7. - Dr. Gallegos consulted for PCI of RCA  8. - On chronic warfarin Rx, on hold for now  9. - Start  mg today and daily for total of 5 days as warfarin is resumed post PCI  10. - Start Plavix, load with 300 mg today then 75 mg daily in addition to warfarin.    PCI  "INTERVENTION:    Proximal RCA:  The lesion was successfully intervened. Post-stenosis of 0% and post-DAREK 3 flow. The vessel was accessed natively. The following items were used: Stent Resolute Rx 4.00x30 (JAYCEE).    C. SUMMARY:    1. Successful PCI/JAYCEE of the proximal RCA.    D. RECOMMENDATIONS:    1. Routine post PCI care.  2. Risk factor reductions.  3. ASA 81mg.  4. Clopidogrel (Plavix) for one year.  5. Weight loss.    Since visit of 1/22/2015, no CP nor SOB. Been compliant with medications, no problem. Noted venous ulcer in the left leg about a week ago. Dressing with peroxide. Lab from [facility] reviewed, K+ remains 5.5 with stable Cr of 1.78, eGFR 39. Active with rental properties upkeep.     Since visit of 3/27, no new problem, difficulties in getting lab results from [facility]. Reviewed eGFR 39, K+ 5.5, will need to stay with low K+ diet. Problem with venous stasis ulceration in the left leg. Worked with Wound Care for 2 weeks, told to return if problem.     Since visit of 4/23,no new problem, feeling "pretty good". In process of selling rental properties. Active with walking about 4 miles a day, takes about an hour. Legs better with ACE stocking. Review lab, [facility] on 5/15/2015 K+ 4.7, stable renal function with eGFR 41, CBC in 1/2015 Hgb 11.8, last Ferritin level in 4/2011.  ECHO, 5/2015, CONCLUSIONS   1 - Moderately enlarged aortic root. measuring 4.1 cm at sinotubular junction.  2 - Concentric hypertrophy. Wall thickness is increased, with the septum and the posterior wall each measuring 1.4 cm across.  3 - Low normal to mildly depressed left ventricular systolic function (EF 50-55%).   4 - Normal left ventricular diastolic function.   5 - Right ventricular enlargement with normal systolic function.   6 - Difficult windows, Optison contrast used for wall motion analysis.   7 - Suggestion for some improvement in LVEF from Echo on 5/7/2014.     Since visit of 5/29/2015, more active, compliant with medications, PT " twice weekly for an hour, no problem. Last BMP in 5/2015, K+ 4.7, Cr 1.7, eGFR 41. Under care of Dr. Torrez for venous stasis and ulcer. Uses support hose occasionally due to the hot weather.    Since visit of 9/10/2015, no new problem, less stress, no regular exercise. Had completed phase II Cardiac Rehab. Asked to consider phase III. Home BP is good, 135. Citlali fixes medications, don't skip.    Since visit of 2/8/2016, no new problem. Undergoing urologic evaluation for kidney mass with biopsies of the bladder, prostate, and the right kidney, no problem with the procedure, awaiting results. ECG shows RBBB, no problem with medications, no regular exercise but considering to start. Decline stress test, no CP nor SOB.     In 12/2016, first concern is chronic fatigue, received 42 radiation Rx, have nocturia 3X nightly which interrupt the sleep, gets only about 5 hours. Will review with upcoming visit with Urologist. Stay active, able to walk 2 blocks with can, also uses free weights 2X weekly, for 15-20 minutes. Have DORA, uses CPAP 100%. For past 2 months had 2 episode of chest pain under emotional stress, last up to 15 minutes, max grade 3/10, have not use any NTG. Chart reviewed - last nuclear stress test in 12/2014. Had JAYCEE placed in 1/2015. Last lipid is excellent, LDL 57, non-HDL 75. Weight stable at around 294 lbs.   Echo, 6/2016 CONCLUSIONS     1 - Mild left ventricular enlargement.     2 - Eccentric hypertrophy.     3 - Moderately depressed left ventricular systolic function (EF 35-40%).     4 - Normal left ventricular diastolic function.     5 - Right ventricular enlargement with normal systolic function.     6 - The estimated PA systolic pressure is greater than 25 mmHg.     7 - Mild tricuspid regurgitation.     8 - Trivial to mild aortic regurgitation.     9 - Suggestion for deterioration in LVEF from Echo in 5/2015.     10 - Difficult windows, Optison contrast used for wall motion analysis.     Carotid  US  Consider repeat study in six months.    CONCLUSIONS   There is 40 - 49% right Internal Carotid stenosis.  There is 20 - 39% left Internal Carotid stenosis.    Antegrade flows in the vertebral arteries. Homogenous plaques noted in both ICAs.    In 2/2017, active with rental, feels tired all the time, using CPAP 100%, wake up feeling all right then tired after 2-3 hours. Was using hydrocodone bid for chronic pains, out of medications for 3 weeks, feels more tired off the narcotic. Explained need for regular exercise with muscle strengthening.  Lexiscan - Nuclear Quantitative Functional Analysis:   LVEF: 42 % (normal is 47 - 59)  LVED Volume: 124 ml (normal is 91 - 155)  LVES Volume: 72 ml (normal is 40 - 78)    Impression: ABNORMAL MYOCARDIAL PERFUSION  1. There is moderate intensity fixed defects in the lateral and inferolateral walls of the left ventricle, consistent with myocardial injury. There is trivial francine-injury ischemia.   2. There is abnormal wall motion at rest showing moderate hypokinesis of the inferolateral and inferior walls of the left ventricle.   3. There is resting LV dysfunction with a reduced ejection fraction of 42 %.  (normal is 47 - 59)  4. The ventricular volumes are normal at rest and stress.   5. The extracardiac distribution of radioactivity is normal.   6. When compared to the previous study from 12/15/2014, the inferolateral defects now appears fixed.  7. [facility] SSS=15, SRS=14, and SDS=1, suggest that only 1% of myocardium may be ischemic.    Carotid US - CONCLUSIONS   There is 20 - 39% right Internal Carotid stenosis.  There is 0 - 19% left Internal Carotid stenosis.    Homogenous plaques noted in the ICAs, antegrade flows in the vertebral  arteries.    In 7/2017, here supposedly for HTN but not certain, also planning to do RF nerve ablation next Thursday with Dr. Causey, no surgical clearance requested. BP in PCP office was 112/60. Compliant with medications. Also have started  using BiPAP every night for DORA, feel better. Denies any CP nor SOB. Active with property management and HA. ECG shows NSR, RBBB.     In 1/2018, no cardiac symptom, no heart worries. Very few home BP. Remain active using the cane, limited by loss of balance due to neuropathy of the LE. Fallen twice, due to the shoe, uses cane as needed. One episode, hit nose with bleeding. Labs reviewed from 9/2017: LDL 51.4, A1C 6.7%, anemia Hgb 9.8, CKD stage 3, eGFR 43   Echo in 8/2017 - CONCLUSIONS     1 - Eccentric hypertrophy.     2 - Moderately depressed left ventricular systolic function (EF 35-40%).     3 - Regional wall motion analysis consistent with ischemic disease.     4 - Impaired LV relaxation, normal LAP (grade 1 diastolic dysfunction).     5 - Trivial to mild mitral regurgitation.     6 - Right ventricular enlargement with normal systolic function.     7 - The estimated PA systolic pressure is 26 mmHg.     8 - No definite change from Echo in 6/2016.     In 7/2018, occasional electricity in the heart, very brief. ECG in NSR, 62 bpm, PVC and RBBB. No CP, stay active dealing with a lot friends' problem, he is the problem solver, very stressful.  Carotid US 2/2017  CONCLUSIONS   There is 0 - 19% right Internal Carotid stenosis.  There is 0 - 19% left Internal Carotid stenosis.    Homogenous plaques in the ICAs with antegrade flows in the vertebral arteries.    Holter - Conclusion   · NSR with occasional mostly monomorphic PVC, 3000, about 2% of complexes, rare PAC  · No symptom reported        In 1/2019, return for 6 months review. No heart worries, no cardiac symptoms. Trying to be active, walking a problem with leg weakness and dizziness. Considered high fall risk. Fairly sedentary. LDL 8/2018, 50. Lots of agitation from the renters.   Echo 8/2018 - The left ventricle cavity is normal.  · Left atrium is moderately dilated.  · Tricuspid valve shows mild regurgitation.  · Left ventricle ejection fraction is low normal  at 51%  · Normal LV diastolic function.  · RV systolic function is normal.  · Improved LVEF from Echo in 8/2017     Difficult windows, Lumason contrast used for wall motion analysis.    HPI Comments: in 7/2019, here for 6 months review, remain pretty limited but no symptom. No heart worries.    Review of Systems   Constitution: Negative for diaphoresis, fever, some weakness, and malaise/fatigue, no night sweats and but weight stable 1/2019.  HENT: Negative for headaches, nosebleeds and tinnitus.   Eyes: Negative for visual disturbance. Have watery eyes, photophobia  Cardiovascular: Positive for dyspnea on exertion and chest pain with emotional stress. Negative claudication, cyanosis, irregular heartbeat, leg swelling, near-syncope, orthopnea, palpitations and paroxysmal nocturnal dyspnia.   Respiratory: Negative for cough, shortness of breath, sleep disturbances due to breathing, snoring and wheezing. [place] score 13 on CPAP 100% use  Endocrine: Negative for polydipsia and polyuria.   Hematologic/Lymphatic: Does not bruise/bleed easily.   Skin: Negative for nail changes, color change, flushing, poor wound healing and suspicious lesions. ecchymosis on ASA especially with the cat, and warfarin  Musculoskeletal: Positive for arthritis, back pain and muscle cramps on daily Xanax 1 mg. Negative for falls, gout, joint pain, joint swelling,   Bilateral hip replacements (right 1996, left 2001), right knee arthroscopic procedure 1996.  Have muscle weakness and myalgias in hip and legs, also muscle cramps in the hands, and legs at night.  Gastrointestinal: Negative for heartburn, hematemesis, hematochezia, melena and nausea. Have no bloating, have constipation and excessive gas.  Neurological: Negative for disturbances in coordination, have excessive daytime sleepiness, dizziness, focal weakness in both LE, occasional light-headedness, have numbness, occasional loss of balance, no vertigo.   Psychiatric/Behavioral: Negative  "for depression and substance abuse. The patient is nervous/anxious and stressed with rentals and memory loss. The patient does not have insomnia.        Objective:     Physical Exam   Constitutional: He is oriented to person, place, and time. He appears well-developed and well-nourished.   HENT:   Head: Normocephalic.   Eyes: Conjunctivae and EOM are normal. Pupils are equal, round, and reactive to light.   Neck: Normal range of motion. Neck supple. No JVD present. No thyromegaly present.   Cardiovascular: regular rate, regular rhythm, soft heart sounds and intact distal pulses. Exam reveals no gallop and no friction rub.   No murmur heard.  No swelling.   Pulmonary/Chest: Effort normal. He has no wheezes. He has no rales. He exhibits no tenderness.   Abdominal: Soft. Bowel sounds are normal. There is no tenderness.  Protuberance, waist circumference 57.5 inches increased to 58" , hip 55 inches.   Musculoskeletal: Normal range of motion. He exhibits No tenderness (mild behind the left knee.). He exhibits 1+ edema in left lower extremities to the knee.   Lymphadenopathy:   He has no cervical adenopathy.   Neurological: He is alert and oriented to person, place, and time.   Skin: Skin is warm and dry. No rash noted.   Venous stasis, bilateral with stasis dermatitis with multiple scratches.        Assessment:    Richard was seen today for 6 month f/u.    Diagnoses and all orders for this visit:    Status post insertion of drug eluting coronary artery stent, 3x, last RCA 1/2015    Hypertension, unspecified type  -     EKG 12-lead    Thoracic aortic aneurysm without rupture    Chronic low back pain, unspecified back pain laterality, with sciatica presence unspecified    CKD (chronic kidney disease) stage 3, GFR 30-59 ml/min, 41    Type 2 diabetes mellitus with stage 3 chronic kidney disease, without long-term current use of insulin    Hypercoagulable state, Prothrombin mutation    Long term (current) use of " anticoagulants    Pure hypercholesterolemia    Hypertensive left ventricular hypertrophy, without heart failure    LV dysfunction, EF 40%    Morbid obesity, today BMI 42.8    History of MI (myocardial infarction), 2010    Paroxysmal atrial fibrillation    Peripheral vascular disease    Sedentary lifestyle    Obstructive sleep apnea syndrome    Weakness of both lower extremities  -     Ambulatory consult to Physical Medicine Rehab        Plan:      - All medical issues reviewed, continue current Rx.  - CV status stable, continue current Rx, all medications reviewed, patient acknowledge good understanding.  - Check home blood pressure, 2 days weekly, do 2 readings within 5 minutes in AM and PM, keep log for review.  - Weigh twice weekly, try to lose 1-2 lbs per week  - Highly recommend 30 minutes of exercise daily, can have Sunday off, with 2-3 sessions of muscle strengthening weekly. A  would be very helpful.  - Recommend at least biannual cardiovascular evaluation in view of his significant risk factors. Patient's preference        Patient Active Problem List   Diagnosis    Diabetes mellitus with chronic kidney disease, stage III    MTHFR mutation (methylenetetrahydrofolate reductase)    Sleep apnea, using BiPAP nightly, 1999, last sleep test in 2013    Hypertension associated with diabetes    Atherosclerosis of native coronary artery with angina pectoris    Hyperlipidemia, baseline     Gout    GERD (gastroesophageal reflux disease)    Hypercoagulable state, Prothrombin mutation    Colon polyps    Anxiety    Chronic back pain    Morbid obesity, today BMI 42.8    Carotid disease, bilateral    Aortic aneurysm, thoracic, 4.3 cm, 8/2010    GARY (generalized anxiety disorder)    CKD (chronic kidney disease) stage 3, GFR 30-59 ml/min, 41    Abnormal cardiovascular stress test    LV dysfunction, EF 40%    Long term (current) use of anticoagulants    OA (osteoarthritis). post  bilateral THR, 2001    Diabetic neuropathy    H/O bariatric surgery, 2008    Osteoarthritis, knee    BPH with obstruction/lower urinary tract symptoms    Weakness of both lower extremities    Proteinuria    Paroxysmal atrial fibrillation    Sedentary lifestyle    Stasis dermatitis of both legs    Type 2 diabetes mellitus with diabetic nephropathy    Hypertensive left ventricular hypertrophy, without heart failure    Prostate cancer    Sleep deprivation    Chronic fatigue    Ulcer of toe of left foot    Peripheral vascular disease    Facet arthropathy    Anemia, chronic disease    Uncomplicated opioid dependence    Chronically on benzodiazepine therapy    MCI (mild cognitive impairment)    Closed nondisplaced fracture of base of fourth metacarpal bone of left hand    Diabetic ulcer of toe of right foot associated with type 2 diabetes mellitus, with fat layer exposed    Ulcer of right lower leg, limited to breakdown of skin    Right foot ulcer, limited to breakdown of skin    Pain in right foot    Cardiomyopathy    History of MI (myocardial infarction), 2010    Status post insertion of drug eluting coronary artery stent, 3x, last RCA 1/2015     Total face-to-face time with the patient was 30 minutes and greater than 50% was spent in counseling and coordination of care. The above assessment and plan have been discussed at length. Labs and procedure over the last 6 months reviewed. Problem List updated. Asked to bring in all active medications / pills bottles with next visit.

## 2019-07-26 ENCOUNTER — TELEPHONE (OUTPATIENT)
Dept: FAMILY MEDICINE | Facility: CLINIC | Age: 74
End: 2019-07-26

## 2019-07-26 NOTE — TELEPHONE ENCOUNTER
----- Message from Lisha Velasco sent at 7/26/2019  9:24 AM CDT -----  Type: Needs Medical Advice    Who Called:  Daughter / Citlali Frye   Symptoms (please be specific): neurology Dr cMcann passed away   How long has patient had these symptoms:  Being treated for dementia   Pharmacy name and phone #:     Best Call Back Number: [phone]   Additional Information: request a referral for a new neurologist

## 2019-07-26 NOTE — TELEPHONE ENCOUNTER
Patient needs a new referral to a neurologist as the one that was caring for the patient, Dr. Mccann, has passed away. The patient was being treated for dementia.

## 2019-07-27 ENCOUNTER — PATIENT MESSAGE (OUTPATIENT)
Dept: FAMILY MEDICINE | Facility: CLINIC | Age: 74
End: 2019-07-27

## 2019-07-27 DIAGNOSIS — G31.84 MCI (MILD COGNITIVE IMPAIRMENT): Primary | ICD-10-CM

## 2019-07-30 DIAGNOSIS — M17.0 PRIMARY OSTEOARTHRITIS OF BOTH KNEES: ICD-10-CM

## 2019-07-30 DIAGNOSIS — M19.90 OTHER TYPE OF OSTEOARTHRITIS, UNSPECIFIED SITE: ICD-10-CM

## 2019-07-30 DIAGNOSIS — M54.5 CHRONIC LOW BACK PAIN, UNSPECIFIED BACK PAIN LATERALITY, WITH SCIATICA PRESENCE UNSPECIFIED: Primary | ICD-10-CM

## 2019-07-30 DIAGNOSIS — G89.29 CHRONIC LOW BACK PAIN, UNSPECIFIED BACK PAIN LATERALITY, WITH SCIATICA PRESENCE UNSPECIFIED: Primary | ICD-10-CM

## 2019-08-02 RX ORDER — ALENDRONATE SODIUM AND CHOLECALCIFEROL 70; 2800 MG/1; [IU]/1
TABLET ORAL
Qty: 4 TABLET | Refills: 0 | Status: SHIPPED | OUTPATIENT
Start: 2019-08-02 | End: 2019-09-25 | Stop reason: SDUPTHER

## 2019-08-06 ENCOUNTER — TELEPHONE (OUTPATIENT)
Dept: HEMATOLOGY/ONCOLOGY | Facility: CLINIC | Age: 74
End: 2019-08-06

## 2019-08-06 NOTE — TELEPHONE ENCOUNTER
We do not follow his coumadin     ----- Message from Kai Ortiz MD sent at 8/5/2019  4:58 PM CDT -----  INR is adequate

## 2019-08-08 ENCOUNTER — PATIENT OUTREACH (OUTPATIENT)
Dept: ADMINISTRATIVE | Facility: HOSPITAL | Age: 74
End: 2019-08-08

## 2019-08-12 DIAGNOSIS — M54.5 CHRONIC LOW BACK PAIN, UNSPECIFIED BACK PAIN LATERALITY, WITH SCIATICA PRESENCE UNSPECIFIED: ICD-10-CM

## 2019-08-12 DIAGNOSIS — F41.9 ANXIETY: ICD-10-CM

## 2019-08-12 DIAGNOSIS — G89.29 CHRONIC LOW BACK PAIN, UNSPECIFIED BACK PAIN LATERALITY, WITH SCIATICA PRESENCE UNSPECIFIED: ICD-10-CM

## 2019-08-12 RX ORDER — HYDROCODONE BITARTRATE AND ACETAMINOPHEN 7.5; 325 MG/1; MG/1
1 TABLET ORAL EVERY 6 HOURS PRN
Qty: 90 TABLET | Refills: 0 | Status: SHIPPED | OUTPATIENT
Start: 2019-08-12 | End: 2019-09-13 | Stop reason: SDUPTHER

## 2019-08-12 RX ORDER — ALPRAZOLAM 1 MG/1
TABLET ORAL
Qty: 30 TABLET | Refills: 0 | Status: SHIPPED | OUTPATIENT
Start: 2019-08-12 | End: 2019-09-15 | Stop reason: DRUGHIGH

## 2019-08-16 ENCOUNTER — TELEPHONE (OUTPATIENT)
Dept: HEMATOLOGY/ONCOLOGY | Facility: CLINIC | Age: 74
End: 2019-08-16

## 2019-08-16 DIAGNOSIS — E87.5 HYPERKALEMIA: Primary | ICD-10-CM

## 2019-08-16 NOTE — TELEPHONE ENCOUNTER
Labs sent to PCP     ----- Message from Kai Ortiz MD sent at 8/16/2019 10:01 AM CDT -----  Potassium is high ... please forward to the attention of his PCP

## 2019-08-20 ENCOUNTER — DOCUMENTATION ONLY (OUTPATIENT)
Dept: FAMILY MEDICINE | Facility: CLINIC | Age: 74
End: 2019-08-20

## 2019-08-20 NOTE — PROGRESS NOTES
Pre-Visit Chart Review  For Appointment Scheduled on 8/22/2019.       Health Maintenance Due   Topic Date Due    Eye Exam  05/21/2019

## 2019-08-22 ENCOUNTER — OFFICE VISIT (OUTPATIENT)
Dept: FAMILY MEDICINE | Facility: CLINIC | Age: 74
End: 2019-08-22
Payer: MEDICARE

## 2019-08-22 VITALS
DIASTOLIC BLOOD PRESSURE: 72 MMHG | BODY MASS INDEX: 43.14 KG/M2 | HEART RATE: 58 BPM | TEMPERATURE: 98 F | OXYGEN SATURATION: 96 % | HEIGHT: 69 IN | SYSTOLIC BLOOD PRESSURE: 130 MMHG | WEIGHT: 291.25 LBS

## 2019-08-22 DIAGNOSIS — E78.00 PURE HYPERCHOLESTEROLEMIA: ICD-10-CM

## 2019-08-22 DIAGNOSIS — I42.9 CARDIOMYOPATHY, UNSPECIFIED TYPE: ICD-10-CM

## 2019-08-22 DIAGNOSIS — E11.21 TYPE 2 DIABETES MELLITUS WITH DIABETIC NEPHROPATHY, WITHOUT LONG-TERM CURRENT USE OF INSULIN: ICD-10-CM

## 2019-08-22 DIAGNOSIS — E11.59 HYPERTENSION ASSOCIATED WITH DIABETES: ICD-10-CM

## 2019-08-22 DIAGNOSIS — K21.9 GASTROESOPHAGEAL REFLUX DISEASE WITHOUT ESOPHAGITIS: ICD-10-CM

## 2019-08-22 DIAGNOSIS — N18.30 CKD (CHRONIC KIDNEY DISEASE) STAGE 3, GFR 30-59 ML/MIN: ICD-10-CM

## 2019-08-22 DIAGNOSIS — I73.9 PERIPHERAL VASCULAR DISEASE: ICD-10-CM

## 2019-08-22 DIAGNOSIS — I15.2 HYPERTENSION ASSOCIATED WITH DIABETES: ICD-10-CM

## 2019-08-22 DIAGNOSIS — N18.30 TYPE 2 DIABETES MELLITUS WITH STAGE 3 CHRONIC KIDNEY DISEASE, WITHOUT LONG-TERM CURRENT USE OF INSULIN: Primary | Chronic | ICD-10-CM

## 2019-08-22 DIAGNOSIS — F41.1 GAD (GENERALIZED ANXIETY DISORDER): ICD-10-CM

## 2019-08-22 DIAGNOSIS — E11.22 TYPE 2 DIABETES MELLITUS WITH STAGE 3 CHRONIC KIDNEY DISEASE, WITHOUT LONG-TERM CURRENT USE OF INSULIN: Primary | Chronic | ICD-10-CM

## 2019-08-22 DIAGNOSIS — G31.84 MCI (MILD COGNITIVE IMPAIRMENT): ICD-10-CM

## 2019-08-22 PROCEDURE — 3044F HG A1C LEVEL LT 7.0%: CPT | Mod: CPTII,S$GLB,, | Performed by: FAMILY MEDICINE

## 2019-08-22 PROCEDURE — 3078F DIAST BP <80 MM HG: CPT | Mod: CPTII,S$GLB,, | Performed by: FAMILY MEDICINE

## 2019-08-22 PROCEDURE — 99499 UNLISTED E&M SERVICE: CPT | Mod: S$GLB,,, | Performed by: FAMILY MEDICINE

## 2019-08-22 PROCEDURE — 3075F SYST BP GE 130 - 139MM HG: CPT | Mod: CPTII,S$GLB,, | Performed by: FAMILY MEDICINE

## 2019-08-22 PROCEDURE — 1101F PR PT FALLS ASSESS DOC 0-1 FALLS W/OUT INJ PAST YR: ICD-10-PCS | Mod: CPTII,S$GLB,, | Performed by: FAMILY MEDICINE

## 2019-08-22 PROCEDURE — 3075F PR MOST RECENT SYSTOLIC BLOOD PRESS GE 130-139MM HG: ICD-10-PCS | Mod: CPTII,S$GLB,, | Performed by: FAMILY MEDICINE

## 2019-08-22 PROCEDURE — 1101F PT FALLS ASSESS-DOCD LE1/YR: CPT | Mod: CPTII,S$GLB,, | Performed by: FAMILY MEDICINE

## 2019-08-22 PROCEDURE — 3078F PR MOST RECENT DIASTOLIC BLOOD PRESSURE < 80 MM HG: ICD-10-PCS | Mod: CPTII,S$GLB,, | Performed by: FAMILY MEDICINE

## 2019-08-22 PROCEDURE — 99999 PR PBB SHADOW E&M-EST. PATIENT-LVL III: ICD-10-PCS | Mod: PBBFAC,,, | Performed by: FAMILY MEDICINE

## 2019-08-22 PROCEDURE — 99214 PR OFFICE/OUTPT VISIT, EST, LEVL IV, 30-39 MIN: ICD-10-PCS | Mod: S$GLB,,, | Performed by: FAMILY MEDICINE

## 2019-08-22 PROCEDURE — 3044F PR MOST RECENT HEMOGLOBIN A1C LEVEL <7.0%: ICD-10-PCS | Mod: CPTII,S$GLB,, | Performed by: FAMILY MEDICINE

## 2019-08-22 PROCEDURE — 99499 RISK ADDL DX/OHS AUDIT: ICD-10-PCS | Mod: S$GLB,,, | Performed by: FAMILY MEDICINE

## 2019-08-22 PROCEDURE — 99999 PR PBB SHADOW E&M-EST. PATIENT-LVL III: CPT | Mod: PBBFAC,,, | Performed by: FAMILY MEDICINE

## 2019-08-22 PROCEDURE — 99214 OFFICE O/P EST MOD 30 MIN: CPT | Mod: S$GLB,,, | Performed by: FAMILY MEDICINE

## 2019-08-22 RX ORDER — PAROXETINE HYDROCHLORIDE 20 MG/1
20 TABLET, FILM COATED ORAL EVERY MORNING
Qty: 30 TABLET | Refills: 11 | Status: SHIPPED | OUTPATIENT
Start: 2019-08-22 | End: 2019-11-22

## 2019-08-25 NOTE — PROGRESS NOTES
Subjective:       Patient ID: Richard Bustos is a 73 y.o. male.    Chief Complaint: Diabetes; Hypertension; Hyperlipidemia; Coronary Artery Disease; Gastroesophageal Reflux; Chronic back pain; and Hypercoagulable state    Patient presents here for 6 month follow-up of diabetes, hypertension, hyperlipidemia, CAD, GERD, chronic back pain, prostate cancer, and hypercoagulable state.  Patient has been seen every 3 months due to his chronic pain medications, alternating every 6 months with me and every 6 months with the physician's assistant so that he is seen every 3 months.  His  was reviewed and there is no evidence of any diversion or misuse.  He is up-to-date on his pain contract.  He is presently taking both hydrocodone and alprazolam, and I instructed him that at his age, he needs to be on 1 of the other but not both as both can cause some degree of respiratory insufficiency.  He feels like he needs the pain medication more so I will slowly taper his dose of alprazolam with each successive renewal.  Presently his taking 1 mg daily and I will decreased to 0.5 mg daily for 30 days then 0.25 mg daily times 30 days then discontinue.  We did talk about a plan for his anxiety as he is concerned about being off of the Xanax.  In fact, his plan was to ask me to increase the dosage before I told him that we would discontinue it.  He is presently on Lexapro as an antidepressant as well as for mild anxiety but I have discussed with him the possibility of changing this to Paxil for a better anti anxiety drug and he is agreeable.  As far as his diabetes, this remains under good control with his most recent hemoglobin A1c being 5.6%.  He denies any hypoglycemia and he is on diet control at this time.  He does have diabetic neuropathy as well as diabetic nephropathy and also has peripheral vascular disease. He has no diabetic retinopathy.  As far as his hypertension, this is well controlled on his present medication as well as  his low-sodium diet.  His hyperlipidemia is also very well controlled on his present dose of atorvastatin and his low-fat low-cholesterol diet.  His most recent blood work shows a total cholesterol of 114, HDL 41, LDL 51, triglycerides 111.  His CMP shows an elevated glucose of 134, elevated BUN and creatinine of 43 and 2.0 respectively, and a GFR of 32.6.  He does see Dr. Joya for his chronic kidney disease. He is also followed on a regular basis by Urology for his prostate cancer and his PSAs or extremely low.  He is also followed by the [facility] as he is on Coumadin for his hypercoagulable state.  His INR has been therapeutic range and he has had no significant problems with bleeding or excessive bruising.  He does have a history of cardiomyopathy but this is stable and he has had no history of congestive heart failure.  He had recently been seeing a neurologist for some memory loss and was diagnosed with mild cognitive impairment.  The neurologist's he has been seeing suddenly passed away several months ago so he has been referred to a new neurologist.  He had been placed on Namenda 10 mg twice daily to see if this would help.  Patient is unsure if this is helping and I have not had the opportunity to talk to his family to get their opinion.  As far as health maintenance, he is up-to-date with all of his recommended screening exams and immunizations with the exception of a shingles vaccine and an eye exam.  However, he does state that he just had an eye exam and is fact going to  his due glasses later today.  I have given him our fax number to have the ophthalmologist send the results of his eye exam to us.    Diabetes   He presents for his follow-up diabetic visit. He has type 2 diabetes mellitus. His disease course has been stable. Hypoglycemia symptoms include nervousness/anxiousness. Pertinent negatives for hypoglycemia include no dizziness or headaches. Associated symptoms include foot  paresthesias. Pertinent negatives for diabetes include no chest pain, no fatigue, no polydipsia, no polyuria and no weight loss. There are no hypoglycemic complications. Symptoms are stable. Diabetic complications include heart disease, nephropathy, peripheral neuropathy and PVD. Pertinent negatives for diabetic complications include no CVA. Risk factors for coronary artery disease include diabetes mellitus, dyslipidemia, hypertension, male sex, obesity and sedentary lifestyle. Current diabetic treatment includes diet. He is compliant with treatment most of the time. His weight is stable. He is following a diabetic, low fat/cholesterol and low salt diet. Meal planning includes avoidance of concentrated sweets. He has had a previous visit with a dietitian. He rarely participates in exercise. There is no change in his home blood glucose trend. An ACE inhibitor/angiotensin II receptor blocker is being taken. He sees a podiatrist.Eye exam is current.   Hypertension   This is a chronic problem. The problem is unchanged. The problem is controlled. Pertinent negatives include no chest pain, headaches, palpitations or shortness of breath. Risk factors for coronary artery disease include diabetes mellitus, dyslipidemia, male gender, obesity and sedentary lifestyle. Past treatments include beta blockers and ACE inhibitors. The current treatment provides moderate improvement. Compliance problems include exercise.  Hypertensive end-organ damage includes kidney disease, CAD/MI and PVD. There is no history of CVA or heart failure. Identifiable causes of hypertension include chronic renal disease.   Hyperlipidemia   This is a chronic problem. The problem is controlled. Recent lipid tests were reviewed and are normal. Exacerbating diseases include chronic renal disease, diabetes and obesity. Factors aggravating his hyperlipidemia include beta blockers. Pertinent negatives include no chest pain, myalgias or shortness of breath.  Current antihyperlipidemic treatment includes statins. The current treatment provides significant improvement of lipids. Compliance problems include adherence to exercise.  Risk factors for coronary artery disease include diabetes mellitus, dyslipidemia, hypertension, male sex, obesity and a sedentary lifestyle.     Review of Systems   Constitutional: Negative for chills, fatigue, fever, unexpected weight change and weight loss.   HENT: Negative for congestion, ear pain, postnasal drip and sore throat.    Eyes: Negative for pain and visual disturbance.        Up-to-date with eye exam but we need to get the official report from the examining ophthalmologist/optometrist   Respiratory: Negative for cough and shortness of breath.    Cardiovascular: Positive for leg swelling (Chronic). Negative for chest pain and palpitations.   Gastrointestinal: Negative for abdominal pain, diarrhea, nausea and vomiting.   Endocrine: Negative for polydipsia and polyuria.   Genitourinary: Negative for difficulty urinating, dysuria, flank pain and frequency.        Nocturia times 1-2 per night   Musculoskeletal: Positive for arthralgias and back pain. Negative for myalgias.   Neurological: Negative for dizziness, syncope, light-headedness and headaches.   Hematological: Negative for adenopathy. Bruises/bleeds easily.   Psychiatric/Behavioral: Negative for agitation and sleep disturbance. The patient is nervous/anxious.        Objective:      Physical Exam   Constitutional: He is oriented to person, place, and time.   Morbidly obese elderly male in no acute distress at this time   HENT:   Head: Normocephalic and atraumatic.   Right Ear: External ear normal.   Left Ear: External ear normal.   Nose: Nose normal.   Mouth/Throat: Oropharynx is clear and moist.   Neck: Normal range of motion. Neck supple. No thyromegaly present.   Cardiovascular: Normal rate, regular rhythm and normal heart sounds.   No murmur heard.  Pulses:       Dorsalis  pedis pulses are 1+ on the right side, and 1+ on the left side.        Posterior tibial pulses are 0 on the right side, and 0 on the left side.   Pulmonary/Chest: Effort normal and breath sounds normal. He has no wheezes. He has no rales.   Abdominal: Soft. Bowel sounds are normal. There is no tenderness. There is no rebound and no guarding.   Musculoskeletal: He exhibits no edema.   Decreased range of motion secondary to pain and obesity.  He has pain with forward flexion and mildly with extension also.   Lymphadenopathy:     He has no cervical adenopathy.   Neurological: He is alert and oriented to person, place, and time. He has normal reflexes. No cranial nerve deficit.   Psychiatric: He has a normal mood and affect. His behavior is normal.   Vitals reviewed.      Assessment:       1. Type 2 diabetes mellitus with stage 3 chronic kidney disease, without long-term current use of insulin    2. Hypertension associated with diabetes    3. Pure hypercholesterolemia    4. Gastroesophageal reflux disease without esophagitis    5. GARY (generalized anxiety disorder)    6. CKD (chronic kidney disease) stage 3, GFR 30-59 ml/min, 41    7. Type 2 diabetes mellitus with diabetic nephropathy, without long-term current use of insulin    8. Peripheral vascular disease    9. MCI (mild cognitive impairment)    10. Cardiomyopathy, unspecified type        Plan:       1.  Continue present medications with exception of discontinuing his Lexapro as all of his chronic medical problems are stable at this time  2.  Begin Paxil 20 mg daily  3.  Continue diabetic, low-sodium, low-fat low-cholesterol diet and try to increase exercise for weight loss  4.  Continue follow-up with Urology, Nephrology, and Neurology as well as follow-up with the [facility]  5.  As stated above, the patient should not be on both an opioid and a benzodiazepine so I will wean his benzodiazepine off completely over the next several months  6.  Follow up with me  in 6 months and with the physician's assistant in 3 months        Patient readiness: acceptance and barriers:readiness    During the course of the visit the patient was educated and counseled about the following:     Diabetes:  Addressed ADA diet.  Suggested low cholesterol diet.  Encouraged aerobic exercise.  Discussed foot care.  Reminded to get yearly retinal exam.  Discussed ways to avoid symptomatic hypoglycemia.  Discussed sick day management.  Reminded to bring in blood sugar diary at next visit.  Follow up in 6 months or as needed.  Hypertension:   Dietary sodium restriction.  Regular aerobic exercise.  Follow up: 6 months and as needed.  Obesity:   General weight loss/lifestyle modification strategies discussed (elicit support from others; identify saboteurs; non-food rewards, etc).  Informal exercise measures discussed, e.g. taking stairs instead of elevator.  Regular aerobic exercise program discussed.    Goals: Diabetes: Maintain Hemoglobin A1C below 7, Hypertension: Reduce Blood Pressure and Obesity: Reduce calorie intake and BMI    Did patient meet goals/outcomes: Yes    The following self management tools provided: blood pressure log  blood glucose log  excercise log    Patient Instructions (the written plan) was given to the patient/family.     Time spent with patient: 45 minutes    Barriers to medications present (no )    Adverse reactions to current medications (no)    Over the counter medications reviewed (Yes)

## 2019-08-26 ENCOUNTER — PATIENT MESSAGE (OUTPATIENT)
Dept: PODIATRY | Facility: CLINIC | Age: 74
End: 2019-08-26

## 2019-08-27 ENCOUNTER — PATIENT MESSAGE (OUTPATIENT)
Dept: PODIATRY | Facility: CLINIC | Age: 74
End: 2019-08-27

## 2019-08-30 ENCOUNTER — OFFICE VISIT (OUTPATIENT)
Dept: PODIATRY | Facility: CLINIC | Age: 74
End: 2019-08-30
Payer: MEDICARE

## 2019-08-30 VITALS — WEIGHT: 291.25 LBS | HEIGHT: 69 IN | BODY MASS INDEX: 43.14 KG/M2

## 2019-08-30 DIAGNOSIS — B35.1 ONYCHOMYCOSIS DUE TO DERMATOPHYTE: ICD-10-CM

## 2019-08-30 DIAGNOSIS — Z79.01 LONG TERM (CURRENT) USE OF ANTICOAGULANTS: ICD-10-CM

## 2019-08-30 DIAGNOSIS — E11.49 TYPE II DIABETES MELLITUS WITH NEUROLOGICAL MANIFESTATIONS: ICD-10-CM

## 2019-08-30 DIAGNOSIS — L84 CORN OR CALLUS: Primary | ICD-10-CM

## 2019-08-30 PROCEDURE — 11721 DEBRIDE NAIL 6 OR MORE: CPT | Mod: 59,Q9,S$GLB, | Performed by: PODIATRIST

## 2019-08-30 PROCEDURE — 11055 PARING/CUTG B9 HYPRKER LES 1: CPT | Mod: Q9,S$GLB,, | Performed by: PODIATRIST

## 2019-08-30 PROCEDURE — 11055 PR TRIM HYPERKERATOTIC SKIN LESION, ONE: ICD-10-PCS | Mod: Q9,S$GLB,, | Performed by: PODIATRIST

## 2019-08-30 PROCEDURE — 99499 NO LOS: ICD-10-PCS | Mod: S$GLB,,, | Performed by: PODIATRIST

## 2019-08-30 PROCEDURE — 11721 PR DEBRIDEMENT OF NAILS, 6 OR MORE: ICD-10-PCS | Mod: 59,Q9,S$GLB, | Performed by: PODIATRIST

## 2019-08-30 PROCEDURE — 99499 UNLISTED E&M SERVICE: CPT | Mod: S$GLB,,, | Performed by: PODIATRIST

## 2019-08-30 PROCEDURE — 99999 PR PBB SHADOW E&M-EST. PATIENT-LVL III: ICD-10-PCS | Mod: PBBFAC,,, | Performed by: PODIATRIST

## 2019-08-30 PROCEDURE — 99999 PR PBB SHADOW E&M-EST. PATIENT-LVL III: CPT | Mod: PBBFAC,,, | Performed by: PODIATRIST

## 2019-08-30 NOTE — PROGRESS NOTES
Subjective:      Patient ID: Richard Bustos is a 73 y.o. male.    Chief Complaint: Foot Pain (right foot) and Other Misc (Familia Ramirez 8/22/19)    Richard is a 73 y.o. male who presents to the clinic for evaluation and treatment of high risk feet. Richard has a past medical history of Anemia, Anemia of other chronic disease (9/13/2017), Anemia, chronic renal failure (9/13/2017), Anemia, unspecified (9/13/2017), Anticoagulant long-term use, Aorta aneurysm, Arthritis, Atrial fibrillation, CAD (coronary artery disease), CHF (congestive heart failure), Chronic kidney disease, Clotting disorder (9/13/2017), Colon polyp, Diabetes mellitus, Diabetes mellitus type II, Diverticulosis, Elevated PSA, Encounter for blood transfusion, Former smoker, GERD (gastroesophageal reflux disease), Gout, colonic polyps, Hypercoagulable state, Hyperlipidemia, Hypertension, MI, old (2010), Myocardial infarction, Osteomyelitis of ankle or foot (3/9/2017), Prostate cancer (06/2016), Sleep apnea, Squamous cell carcinoma (01/23/2018), and Venous stasis. The patient's chief complaint is pain and bump under right big toe area.  Gradual onset, worsening over past several weeks, aggravated by increased weight bearing, shoe gear, pressure.  No previous medical treatment.  No self treatment.    CC2 thick long discolored misshapen toenails all toes.  Gradual onset, worsening over past several weeks, aggravated by increased weight bearing, shoe gear, pressure.  Periodic debridement helps symptoms.    PCP: Familia Ramirez Jr, MD    Date Last Seen by PCP:   Chief Complaint   Patient presents with    Foot Pain     right foot    Other Misc     Familia Ramirez 8/22/19         Current shoe gear:  Affected Foot: sx shoe right     Unaffected Foot: Rx diabetic extra depth shoes and custom accommodative insoles    History of Trauma: negative  Sign of Infection: none    Hemoglobin A1C   Date Value Ref Range Status   08/16/2019 5.6 4.0 - 5.6 % Final     Comment:     ADA  Screening Guidelines:  5.7-6.4%  Consistent with prediabetes  >or=6.5%  Consistent with diabetes  High levels of fetal hemoglobin interfere with the HbA1C  assay. Heterozygous hemoglobin variants (HbS, HgC, etc)do  not significantly interfere with this assay.   However, presence of multiple variants may affect accuracy.     02/11/2019 6.2 (H) 4.0 - 5.6 % Final     Comment:     ADA Screening Guidelines:  5.7-6.4%  Consistent with prediabetes  >or=6.5%  Consistent with diabetes  High levels of fetal hemoglobin interfere with the HbA1C  assay. Heterozygous hemoglobin variants (HbS, HgC, etc)do  not significantly interfere with this assay.   However, presence of multiple variants may affect accuracy.     10/22/2018 5.9 (H) 4.0 - 5.6 % Final     Comment:     ADA Screening Guidelines:  5.7-6.4%  Consistent with prediabetes  >or=6.5%  Consistent with diabetes  High levels of fetal hemoglobin interfere with the HbA1C  assay. Heterozygous hemoglobin variants (HbS, HgC, etc)do  not significantly interfere with this assay.   However, presence of multiple variants may affect accuracy.     06/17/2013 6.8 (H) 4.8 - 5.6 % Final     Comment:              Increased risk for diabetes: 5.7 - 6.4           Diabetes: >6.4           Glycemic control for adults with diabetes: <7.0  **In order to standardize %HbA1c results worldwide, as of October 11, 2010,  the %HbA1c is being calculated using the master equation recommended in the  consensus statement adopted by the ADA (American Diabetes Assoc), EASD  (European Assoc for the Study of Diabetes), IFCC (International Federation  of Clinical Chemistry and Laboratory Medicine) and IDF (International  Diabetes Federation). Result units: %HgbA1c (DCCT/NGSP).  In common with other methods, Hb A1C values may not accurately reflect mean  blood glucose in patients with hemoglobin variants (HgbF, HgbS and HgbC).  Any cause of shortened erythrocyte survival will reduce exposure of  erythrocytes to  glucose with a consequent decrease in HbA1c (%) values, even  though the time-averaged blood glucose level may be elevated. Causes of  shortened erythrocyte lifetime might be hemolytic anemia or other hemolytic  diseases, homozygous sickle cell trait, pregnancy, recent significant or  chronic blood loss, etc. Caution should be used when interpreting the HbA1c  results from patients with these conditions.       Review of Systems   Constitution: Negative for chills, diaphoresis, fever, malaise/fatigue and night sweats.   Cardiovascular: Positive for leg swelling. Negative for claudication, cyanosis and syncope.   Skin: Positive for nail changes, poor wound healing and suspicious lesions. Negative for color change, dry skin, rash and unusual hair distribution.   Musculoskeletal: Negative for falls, joint pain, joint swelling, muscle cramps, muscle weakness and stiffness.   Gastrointestinal: Negative for constipation, diarrhea, nausea and vomiting.   Neurological: Positive for numbness, paresthesias and sensory change. Negative for brief paralysis, disturbances in coordination, focal weakness and tremors.           Objective:      Physical Exam   Constitutional: He appears well-developed and well-nourished. He is cooperative. No distress.   Cardiovascular:   Pulses:       Dorsalis pedis pulses are 1+ on the right side, and 1+ on the left side.        Posterior tibial pulses are 1+ on the right side, and 1+ on the left side.   Toes warm, pink, cap fill <5 seconds.   Lymphadenopathy:   Negative lymphadenopathy bilateral popliteal fossa and tarsal tunnel.     Neurological: A sensory deficit is present.   Diminished/loss of protective sensation all toes bilateral to 10 gram monofilament.       Skin: Skin is warm and dry. No abrasion, no bruising, no burn, no ecchymosis, no laceration, no lesion and no rash noted. He is not diaphoretic. No erythema. No pallor.     Toenails 1st, 2nd, 3rd, 4th, 5th  bilateral are hypertrophic  thickened 2-3 mm, dystrophic, discolored tanish brown with tan, gray crumbly subungual debris.  Tender to distal nail plate pressure, without periungual skin abnormality of each.    Focal hyperkeratotic lesion consisting entirely of hyperkeratotic tissue without open skin, drainage, pus, fluctuance, malodor, or signs of infection plantar right 1st mtpj.    Otherwise, Skin thin, shiny, atrophic, with decreased density and distribution of pedal hair bilateral, but without hyperpigmentation, minerva discoloration,  ulcers, masses, nodules or cords palpated bilateral feet and legs.                  Assessment:       Encounter Diagnoses   Name Primary?    Corn or callus Yes    Onychomycosis due to dermatophyte     Type II diabetes mellitus with neurological manifestations          Plan:       Richard was seen today for foot pain and other misc.    Diagnoses and all orders for this visit:    Corn or callus    Onychomycosis due to dermatophyte    Type II diabetes mellitus with neurological manifestations      I counseled the patient on his conditions, their implications and medical management.        The patient has received literature on basic diabetic foot care.  Patient will inspect feet daily, wear protective shoe gear when ambulatory, and apply moisturizer to skin as needed to maintain elasticity and help prevent ulceration.    Continue penlac    With the patient's permission, I debrided all ten toenails with a sterile nipper and curette, removing all offending nail and debris.  Patient tolerated the procedure well and related significant relief.      With the patient's permission, I debrided hyperkeratotic lesion(s) as above totaling      1          to, not  Including dermis with sterile #15 blade.  Patient tolerated the procedure well and related significant relief.           Follow up if symptoms worsen or fail to improve.

## 2019-09-12 ENCOUNTER — PATIENT MESSAGE (OUTPATIENT)
Dept: FAMILY MEDICINE | Facility: CLINIC | Age: 74
End: 2019-09-12

## 2019-09-13 DIAGNOSIS — G89.29 CHRONIC LOW BACK PAIN, UNSPECIFIED BACK PAIN LATERALITY, WITH SCIATICA PRESENCE UNSPECIFIED: ICD-10-CM

## 2019-09-13 DIAGNOSIS — M54.5 CHRONIC LOW BACK PAIN, UNSPECIFIED BACK PAIN LATERALITY, WITH SCIATICA PRESENCE UNSPECIFIED: ICD-10-CM

## 2019-09-13 DIAGNOSIS — F41.9 ANXIETY: ICD-10-CM

## 2019-09-13 RX ORDER — ALPRAZOLAM 1 MG/1
TABLET ORAL
Qty: 30 TABLET | Refills: 0 | Status: CANCELLED | OUTPATIENT
Start: 2019-09-13

## 2019-09-15 RX ORDER — HYDROCODONE BITARTRATE AND ACETAMINOPHEN 7.5; 325 MG/1; MG/1
1 TABLET ORAL EVERY 6 HOURS PRN
Qty: 90 TABLET | Refills: 0 | Status: SHIPPED | OUTPATIENT
Start: 2019-09-15 | End: 2019-11-12 | Stop reason: SDUPTHER

## 2019-09-15 RX ORDER — ALPRAZOLAM 0.5 MG/1
0.5 TABLET ORAL NIGHTLY PRN
Qty: 30 TABLET | Refills: 0 | Status: SHIPPED | OUTPATIENT
Start: 2019-09-15 | End: 2019-11-13 | Stop reason: DRUGHIGH

## 2019-09-18 ENCOUNTER — TELEPHONE (OUTPATIENT)
Dept: HEMATOLOGY/ONCOLOGY | Facility: CLINIC | Age: 74
End: 2019-09-18

## 2019-09-25 DIAGNOSIS — I25.10 CORONARY ARTERY DISEASE INVOLVING NATIVE CORONARY ARTERY OF NATIVE HEART WITHOUT ANGINA PECTORIS: Chronic | ICD-10-CM

## 2019-09-25 RX ORDER — ALENDRONATE SODIUM AND CHOLECALCIFEROL 70; 2800 MG/1; [IU]/1
TABLET ORAL
Qty: 4 TABLET | Refills: 0 | Status: SHIPPED | OUTPATIENT
Start: 2019-09-25 | End: 2019-11-10 | Stop reason: SDUPTHER

## 2019-09-25 RX ORDER — CLOPIDOGREL BISULFATE 75 MG/1
TABLET ORAL
Qty: 90 TABLET | Refills: 3 | Status: SHIPPED | OUTPATIENT
Start: 2019-09-25 | End: 2020-09-20 | Stop reason: SDUPTHER

## 2019-10-16 DIAGNOSIS — G89.29 CHRONIC LOW BACK PAIN, UNSPECIFIED BACK PAIN LATERALITY, WITH SCIATICA PRESENCE UNSPECIFIED: ICD-10-CM

## 2019-10-16 DIAGNOSIS — M54.5 CHRONIC LOW BACK PAIN, UNSPECIFIED BACK PAIN LATERALITY, WITH SCIATICA PRESENCE UNSPECIFIED: ICD-10-CM

## 2019-10-16 RX ORDER — HYDROCODONE BITARTRATE AND ACETAMINOPHEN 7.5; 325 MG/1; MG/1
1 TABLET ORAL EVERY 6 HOURS PRN
Qty: 90 TABLET | Refills: 0 | Status: CANCELLED | OUTPATIENT
Start: 2019-10-16

## 2019-10-16 RX ORDER — ALPRAZOLAM 0.5 MG/1
0.5 TABLET ORAL NIGHTLY PRN
Qty: 30 TABLET | Refills: 0 | Status: CANCELLED | OUTPATIENT
Start: 2019-10-16 | End: 2019-11-15

## 2019-10-18 PROBLEM — R41.82 ALTERED MENTAL STATUS: Status: ACTIVE | Noted: 2019-10-18

## 2019-10-19 PROBLEM — G93.40 ENCEPHALOPATHY ACUTE: Status: ACTIVE | Noted: 2019-10-18

## 2019-10-19 PROBLEM — I50.22 CHRONIC SYSTOLIC CONGESTIVE HEART FAILURE: Status: ACTIVE | Noted: 2019-10-19

## 2019-10-19 PROBLEM — N18.30 ACUTE RENAL FAILURE SUPERIMPOSED ON STAGE 3 CHRONIC KIDNEY DISEASE: Status: ACTIVE | Noted: 2019-10-19

## 2019-10-19 PROBLEM — N17.9 ACUTE RENAL FAILURE SUPERIMPOSED ON STAGE 3 CHRONIC KIDNEY DISEASE: Status: ACTIVE | Noted: 2019-10-19

## 2019-10-19 PROBLEM — E87.5 HYPERKALEMIA: Status: ACTIVE | Noted: 2019-10-19

## 2019-10-21 ENCOUNTER — TELEPHONE (OUTPATIENT)
Dept: CARDIOLOGY | Facility: CLINIC | Age: 74
End: 2019-10-21

## 2019-10-21 NOTE — TELEPHONE ENCOUNTER
Call placed to Ms. Godwin in regards to message received. She stated that Ms. Ramos was discharged from [facility] today and they changed his BP mediation from lisinopril to amlodipine 10mg, and also changed his coumadin to 4mg. She stated that he has a blood disorder that his INR needs to be between 2.0-3.0. I advised her I would send a message to Dr. Tate to find out what he would like to do, and give her a call back. She verbalized understanding, and also asked for an appt this week. I offered her an appt on 10/24/19 @ 2p. She accepted. No further issues noted.

## 2019-10-21 NOTE — TELEPHONE ENCOUNTER
----- Message from Basilia Luis sent at 10/21/2019  2:19 PM CDT -----  Type: Needs Medical Advice    Who Called:  Daughter- Citlali Symptoms (please be specific):  How long has patient had these symptoms:   Pharmacy name and phone #:    Best Call Back Number: [phone] Additional Information: Patient was discharged from [facility] in [place] for dehydration  . Patient's blood pressure was changed from rx lisinopril  40 mg to new rx amlodipine 10 mg . Patient's daughter asking advice regarding the change  of pt medication.

## 2019-10-24 ENCOUNTER — OFFICE VISIT (OUTPATIENT)
Dept: CARDIOLOGY | Facility: CLINIC | Age: 74
End: 2019-10-24
Payer: MEDICARE

## 2019-10-24 VITALS
SYSTOLIC BLOOD PRESSURE: 174 MMHG | WEIGHT: 296.31 LBS | BODY MASS INDEX: 43.89 KG/M2 | HEART RATE: 61 BPM | OXYGEN SATURATION: 93 % | HEIGHT: 69 IN | DIASTOLIC BLOOD PRESSURE: 80 MMHG

## 2019-10-24 DIAGNOSIS — I51.9 LV DYSFUNCTION: ICD-10-CM

## 2019-10-24 DIAGNOSIS — G47.33 OBSTRUCTIVE SLEEP APNEA SYNDROME: Chronic | ICD-10-CM

## 2019-10-24 DIAGNOSIS — I25.2 HISTORY OF MI (MYOCARDIAL INFARCTION): ICD-10-CM

## 2019-10-24 DIAGNOSIS — E78.00 PURE HYPERCHOLESTEROLEMIA: ICD-10-CM

## 2019-10-24 DIAGNOSIS — E66.01 MORBID OBESITY: ICD-10-CM

## 2019-10-24 DIAGNOSIS — I15.2 HYPERTENSION ASSOCIATED WITH DIABETES: ICD-10-CM

## 2019-10-24 DIAGNOSIS — E87.5 HYPERKALEMIA: ICD-10-CM

## 2019-10-24 DIAGNOSIS — N18.3 ACUTE RENAL FAILURE SUPERIMPOSED ON STAGE 3 CHRONIC KIDNEY DISEASE, UNSPECIFIED ACUTE RENAL FAILURE TYPE: ICD-10-CM

## 2019-10-24 DIAGNOSIS — I10 HYPERTENSION, UNSPECIFIED TYPE: Primary | ICD-10-CM

## 2019-10-24 DIAGNOSIS — I11.9 HYPERTENSIVE LEFT VENTRICULAR HYPERTROPHY, WITHOUT HEART FAILURE: ICD-10-CM

## 2019-10-24 DIAGNOSIS — D63.8 ANEMIA, CHRONIC DISEASE: ICD-10-CM

## 2019-10-24 DIAGNOSIS — E11.22 TYPE 2 DIABETES MELLITUS WITH STAGE 3 CHRONIC KIDNEY DISEASE, WITHOUT LONG-TERM CURRENT USE OF INSULIN: Chronic | ICD-10-CM

## 2019-10-24 DIAGNOSIS — Z79.01 LONG TERM (CURRENT) USE OF ANTICOAGULANTS: ICD-10-CM

## 2019-10-24 DIAGNOSIS — N18.30 CKD (CHRONIC KIDNEY DISEASE) STAGE 3, GFR 30-59 ML/MIN: ICD-10-CM

## 2019-10-24 DIAGNOSIS — I48.0 PAROXYSMAL ATRIAL FIBRILLATION: ICD-10-CM

## 2019-10-24 DIAGNOSIS — N17.9 ACUTE RENAL FAILURE SUPERIMPOSED ON STAGE 3 CHRONIC KIDNEY DISEASE, UNSPECIFIED ACUTE RENAL FAILURE TYPE: ICD-10-CM

## 2019-10-24 DIAGNOSIS — Z91.89 SEDENTARY LIFESTYLE: ICD-10-CM

## 2019-10-24 DIAGNOSIS — N18.30 TYPE 2 DIABETES MELLITUS WITH STAGE 3 CHRONIC KIDNEY DISEASE, WITHOUT LONG-TERM CURRENT USE OF INSULIN: Chronic | ICD-10-CM

## 2019-10-24 DIAGNOSIS — E11.59 HYPERTENSION ASSOCIATED WITH DIABETES: ICD-10-CM

## 2019-10-24 DIAGNOSIS — R79.89 ELEVATED BRAIN NATRIURETIC PEPTIDE (BNP) LEVEL: ICD-10-CM

## 2019-10-24 DIAGNOSIS — I50.22 CHRONIC SYSTOLIC CONGESTIVE HEART FAILURE: ICD-10-CM

## 2019-10-24 DIAGNOSIS — Z95.5 STATUS POST INSERTION OF DRUG ELUTING CORONARY ARTERY STENT: ICD-10-CM

## 2019-10-24 PROCEDURE — 99499 UNLISTED E&M SERVICE: CPT | Mod: S$GLB,,, | Performed by: INTERNAL MEDICINE

## 2019-10-24 PROCEDURE — 99999 PR PBB SHADOW E&M-EST. PATIENT-LVL III: CPT | Mod: PBBFAC,,, | Performed by: INTERNAL MEDICINE

## 2019-10-24 PROCEDURE — 93000 ELECTROCARDIOGRAM COMPLETE: CPT | Mod: S$GLB,,, | Performed by: INTERNAL MEDICINE

## 2019-10-24 PROCEDURE — 99999 PR PBB SHADOW E&M-EST. PATIENT-LVL III: ICD-10-PCS | Mod: PBBFAC,,, | Performed by: INTERNAL MEDICINE

## 2019-10-24 PROCEDURE — 3044F PR MOST RECENT HEMOGLOBIN A1C LEVEL <7.0%: ICD-10-PCS | Mod: CPTII,S$GLB,, | Performed by: INTERNAL MEDICINE

## 2019-10-24 PROCEDURE — 99215 PR OFFICE/OUTPT VISIT, EST, LEVL V, 40-54 MIN: ICD-10-PCS | Mod: 25,S$GLB,, | Performed by: INTERNAL MEDICINE

## 2019-10-24 PROCEDURE — 3079F DIAST BP 80-89 MM HG: CPT | Mod: CPTII,S$GLB,, | Performed by: INTERNAL MEDICINE

## 2019-10-24 PROCEDURE — 99499 RISK ADDL DX/OHS AUDIT: ICD-10-PCS | Mod: S$GLB,,, | Performed by: INTERNAL MEDICINE

## 2019-10-24 PROCEDURE — 3077F SYST BP >= 140 MM HG: CPT | Mod: CPTII,S$GLB,, | Performed by: INTERNAL MEDICINE

## 2019-10-24 PROCEDURE — 3077F PR MOST RECENT SYSTOLIC BLOOD PRESSURE >= 140 MM HG: ICD-10-PCS | Mod: CPTII,S$GLB,, | Performed by: INTERNAL MEDICINE

## 2019-10-24 PROCEDURE — 1101F PT FALLS ASSESS-DOCD LE1/YR: CPT | Mod: CPTII,S$GLB,, | Performed by: INTERNAL MEDICINE

## 2019-10-24 PROCEDURE — 3044F HG A1C LEVEL LT 7.0%: CPT | Mod: CPTII,S$GLB,, | Performed by: INTERNAL MEDICINE

## 2019-10-24 PROCEDURE — 3079F PR MOST RECENT DIASTOLIC BLOOD PRESSURE 80-89 MM HG: ICD-10-PCS | Mod: CPTII,S$GLB,, | Performed by: INTERNAL MEDICINE

## 2019-10-24 PROCEDURE — 93000 EKG 12-LEAD: ICD-10-PCS | Mod: S$GLB,,, | Performed by: INTERNAL MEDICINE

## 2019-10-24 PROCEDURE — 1101F PR PT FALLS ASSESS DOC 0-1 FALLS W/OUT INJ PAST YR: ICD-10-PCS | Mod: CPTII,S$GLB,, | Performed by: INTERNAL MEDICINE

## 2019-10-24 PROCEDURE — 99215 OFFICE O/P EST HI 40 MIN: CPT | Mod: 25,S$GLB,, | Performed by: INTERNAL MEDICINE

## 2019-10-24 RX ORDER — LISINOPRIL 40 MG/1
40 TABLET ORAL DAILY
Qty: 90 TABLET | Refills: 3 | Status: SHIPPED | OUTPATIENT
Start: 2019-10-24 | End: 2020-03-16

## 2019-10-24 NOTE — PROGRESS NOTES
" Patient ID:  Richard Bustos is a 74 y.o. male who presents for {Misc; evaluation/follow-up:86875::"follow-up"} of No chief complaint on file.      Health literacy: {DESC; LOW/MEDIUM/HIGH:28584} medium  Activities: walking  Nicotine: former  Alcohol: no  Illicit drugs: no  Cardiac symptoms: none  Home BP:yes  Medication compliance: yes  Diet: regular, diabetic, Mediterranean regular  Caffeine: 2 cups daily  Labs:   Last Echo: 08/09/18  Last stress test: 01/18/17  Cardiovascular angiogram:   ECG: 10/17/19  Fundoscopic exam:     No flowsheet data found.      HPI    ROS     Objective:    Physical Exam      Assessment:       1. Hypertension, unspecified type         Plan:         Hypertension, unspecified type  -     EKG 12-lead                "

## 2019-10-24 NOTE — PROGRESS NOTES
Patient ID: Richard Bustos is a 69 y.o. male who presents for follow-up of Follow-up  For post [facility] admission for AMS and EMMY with use of Donepezil, multiple medical morbidities, HFpEF  PCP: Dr. Ramirez, see every 4 months, does labs through [facility] including lipid  Hematologist: Dr. Quezada  Urologist: Dr. Moss  Podiatrist: Dr. Nunes, [place]  Wound care: Dr. Torrez  Pain: Dr. 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, officially retired, still manages 6 rentals, lots of emotional stress, just loss girlfriend, and puppy 10 years  To start home PT coming Monday    Health literacy: medium   Activities: mostly sedentary  Nicotine: quit at age 19 after 4 packs  Alcohol: none  Illicit drugs: none  Cardiac symptoms: none  Home BP: >150/76 off Lisinopril  Medication compliance: yes  Diet: regular  Caffeine: 2 cpd  Labs: 8/2018, LDL 50.8, on Rx, 7/2019 CMP (Cr 1.8, eGFR 37)  10/2019 LDL 51, Cr 1.7 eGFR 39.4, K+ 3.5, up to 5.7 in hospital,  improve from 419 on 10/6/2018, negative troponin, Hgb 8.8  Last Echo: 8/2018  Last stress test: 1/2017, lexiscan  Cardiovascular angiogram: 1/2015 with PCI  ECG: SB, 59, RBBB  Fundoscopic exam: within the past year, negative for HTN changes    In 2/2016:  White male with multiple medical problems (see List). Suffered a NSTEMI in 8/2010, catheterization showed SUMMARY:   1. Three vessel coronary artery disease.   2. Successful PCI. Involved 2 JAYCEE in the LAD and OM1.    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 Off diet at times, had underwent gastri bypass in 2008.    Last Echo in 2010 showed CONCLUSIONS   1 - Mild left ventricular enlargement.   2 - Normal left ventricular function  "(EF 58%).   3 - Diastolic dysfunction.   4 - Biatrial enlargement.   5 - Mildly to moderately enlarged ascending aorta.    Last carotid US: 9/13/2010  Bilateral 20%-40% narrowing.  No symptoms.    Coronary Artery Disease  Presents for follow-up visit. Pertinent negatives include no chest pain, dizziness, leg swelling, muscle weakness, palpitations, shortness of breath or weight gain. Risk factors include hyperlipidemia. The symptoms have been resolved. Compliance with diet is variable. Compliance with exercise is good. Compliance with medications is good.   Hyperlipidemia  Pertinent negatives include no chest pain, focal weakness, myalgias or shortness of breath.     EKG shows sinus bradycardia, rate 45, today NSR, 75, VPC, and nonspecific STTW abnormalities.     Since visit of 7/2012, have lots of stress with tenants, BP noted to be elevated, log reviewed 153-174/, HR 52-74. Had cardiac testing -   ECHO CONCLUSIONS 7/2012   1 - Mild left ventricular enlargement.   2 - Mildly to moderately depressed left ventricular function (EF 40%).   3 - Eccentric hypertrophy.   4 - Mildly to moderately enlarged ascending aorta.   5 - Mild left atrial enlargement.   6 - Normal diastolic function.   7 - Mild aortic regurgitation.   8 - Suggestion for inferoposterior hypokinesia.   9 - Compare to 8/17/2010, there is increase in LVH with decrease in   LVEF from 58%, and new inferoposterior hypokinesia. The Aortic dimensions   have not changed.    Carotid US, 7/2012  IMPRESSION   Findings consistent with less than 50% diameter stenosis of proximal   internal carotid arteries by NASCAET criteria. Flow in the vertebral   arteries appears antegrade bilaterally.    Since visit of 1/23, still with occasional angina, average once a week, lasting about a minute, "light" grade 3-4/10.  Workup:  ECHO, 2/5/20163, CONCLUSIONS   1 - Mild left ventricular enlargement.   2 - Mildly to moderately depressed left ventricular function (EF 40%). "   3 - Normal diastolic function.   4 - Inferoposterior hypokinesia consistent with ischemic disease..   5 - Moderately enlarged ascending aorta. 4.4 cm - stable   6 - No significant change from study of 7/17/2012.    Lexiscan, 2/5/2013  Nuclear Quantitative Functional Analysis:   LVEF: 34 % (normal is 47 - 59)   LVED Volume: 131 ml (normal is 91 - 155)   LVES Volume: 87 ml (normal is 40 - 78)   Impression: ABNORMAL MYOCARDIAL PERFUSION   1. There is evidence for mild to moderate myocardial ischemia in the   lateral apical wall of the left ventricle with associated stress induced   LV cavity dilatation with underlying injury present.   2. There is moderate intensity fixed defect in the inferolateral wall of   the left ventricle, consistent with myocardial injury. There is trivial   francine-injury ischemia.   3. There is abnormal wall motion at rest showing akinesis of the lateral   wall of the left ventricle, moderate global hypokinesis of the left   ventricle. dyskinesis of the inferolateral wall of the left ventricle.   4. There is resting LV dysfunction with a reduced ejection fraction of 34   %. (normal is 47 - 59)   5. The left ventricular volume is mildly increased at rest.   6. The extracardiac distribution of radioactivity is normal.  7. [facility] SSS =15, SRS =10, SDS =5, suggest 6.25% of myocardium may be   ischemic.    Since visit of 2/8, noticed some bilateral leg weakness, post bilateral THR, also occasional charley horse at night, mostly right sided. Today had mild chest pain, grade 2/10, while driving here. Claims to have not heard from [facility], INR on 2/18 was 1.8. Question about Plavix answered. Discussed with daughter, Citlali, over the telephone.     Since visit 2/22, no new problem. Discussed aortic aneurysm, will need at least annual imaging, do not recall the last time. Have claustrophobia requesting Xanax. No problem with the medications, stays very active. Weight reviewed was below 290  lbs in 12/2011.   Apparently likes [employer]!     Since visit of 3/16, had MRA done with use of Xanax and hydrocodone,   Result - Max ascending aorta 4.3 cm stable.  Tolerating medications without problem, using BiPAP nightly, still lot of stress with [employer]ental Properties. Discuss need for exercise program with weigh reduction of 10%. New problem include left leg weakness with localized pain behind the knee. For about 2 weeks, been dancing with new woman.    Since visit of 4/11/2013, hospitalized 2 weeks ago at [facility] for large hematoma due to use of Lovenox as bridge for oral surgery. Coumadin on hold, seems to be getting smaller and softer. Denies any heart symptoms or CP nor SOB. No problem with taking medications and using CPAP. Slowed down due to leg pain and fatigue. Feels good in the AM.    Since visit of 4/9/2014, complain of leg weakness, noted since 2/2013. Denies any CP nor SOB. Miss 2-3 doses of medications monthly. No other problem with medications. Awaken all night due to dog, sleep 10-4. Feels tired in the morning despite using CPAP. Have not had much blood work done. ECG shows AF rate 67, RBBB.  ECHO in 5/2014 CONCLUSIONS   1 - Enlarged aortic root. measuring 3.9 cm at sinotubular junction.  2 - Biatrial enlargement.   3 - Eccentric hypertrophy.   4 - Mildly to moderately depressed left ventricular systolic function (EF 40-45%).   5 - Normal left ventricular diastolic function.   6 - Right ventricular enlargement with normal systolic function.   7 - No significant change from echo on 2/5/2013..   8 - Difficult apical window, Optison contrast used for wall motion analysis..     Since visit of 11/21, no new problem. Some concern of HR down to 40 after exercise. Noted easy fatigue but remains quite sedentary. Discussed need for Coreg titration for LV dysfunction. Labs reviewed, CKD eith eGFR of 47, mild anemia Hgb 12.1, , A1C 4.7, and proteinuria.   Cardiac workup:  Carotid  US, 12/2014 - CONCLUSIONS   There is 0 - 19% right Internal Carotid stenosis.  There is 0 - 19% left Internal Carotid stenosis.    Clean vessels    Lexiscan Nuclear Quantitative Functional Analysis:   LVEF: 38 % (normal is 47 - 59)  LVED Volume: 172 ml (normal is 91 - 155)  LVES Volume: 107 ml (normal is 40 - 78)    Impression: ABNORMAL MYOCARDIAL PERFUSION  1. There is evidence for mild to moderate myocardial ischemia in the inferior and lateral walls of the left ventricle with associated stress induced LV cavity dilatation with underlying injury present.   2. There is moderate to severe intensity fixed defect in the inferolateral wall of the left ventricle, consistent with myocardial injury.   3. There is abnormal wall motion at rest showing moderate global hypokinesis of the left ventricle. severe hypokinesis of the inferior and septal walls of the left ventricle.   4. There is resting LV dysfunction with a reduced ejection fraction of 38 %. (normal is 47 - 59)  5. The left ventricular volume is moderately increased at rest.   6. The extracardiac distribution of radioactivity is normal.   7. When compared to the previous study from 02/05/2013, no significant changes noted.  8. [facility] SSS=15, SRS=8, and SDS=7, suggest that 8.7% of myocardium may be ischemic.    Holter, 12/2014:  PREDOMINANT RHYTHM  1. Sinus arrhythmia with heart rates varying between 41 and 110 bpm with an average of 64 bpm.     VENTRICULAR ARRHYTHMIAS  1. There were frequent mostly monomorphic PVCs totalling 1297 and averaging 54 per hour. There was 1 couplet.    2. 1.6% of complexes.    3. There were no episodes of ventricular tachycardia.    SUPRA VENTRICULAR ARRHYTHMIAS  1. There were rare PACs totalling 108 and averaging 4 per hour.     2. There were no episodes of sustained supraventricular tachycardia.    SINUS NODE FUNCTION  1. There was no evidence of high grade SA sang block.     AV CONDUCTION  1. There was no evidence of high  grade AV block.     DIARY  1. The diary was not returned    MISCELLANEOUS  1. There were persistent hookup related artifacts. Overall, the study was of good quality.     2. This was a tape of adequate length (24 hrs).     Since visit of 12/23/2014, underwent [facility] with PCI of RCA. Off ASA due to rectal bleed. Discuss use of triple Rx for hopefully 3 months post JAYCEE implant. Feeling more energetic, no CP nor SOB. Discussed ascending Aortic aneurysm, last checked in 5/2014 with Echo - stable.   HEMODYNAMIC RESULTS:    LVEDP (Pre): 16 mmHg  LVEDP (Post): 18 mmHg  Ejection Fraction: 40%  Global LV Function: moderately depressed  BP: 109/70  HR: 96    Air rest:  LVEDP: 18    C. ANGIOGRAPHIC RESULTS:    DIAGNOSTIC:  Patient has a right dominant coronary artery.     - Left Main Coronary Artery:  The LM is normal. There is DAREK 3 flow.    - Left Anterior Descending Artery:  The mid LAD has a 50% stenosis. There is DAREK 3 flow. The remaining portion of the vessel has multiple lesions < 50% stenosed. Long stent noted in mid-LAD, 50% ISR.    - Left Circumflex Artery:  The LCX has luminal irregularities. There is DAREK 3 flow.    - Right Coronary Artery:  The mid RCA has a 75% stenosis. There is DAREK 3 flow. The remaining portion of the vessel has luminal irregularities. Appears to be close to distal end of previously placed stent.    - Aortic Root:  The Aortic Root is normal. There is DAREK 3 flow.      D. SUMMARY:    1. Two vessel coronary artery disease.  2. Diastolic dysfunction.  3. - Very difficult case due to tortuosity of the innominate artery, multiple manipulation needed for cannulation of CA.  4. - RCA lesion appears as a large plaque with significant luminal stenois.    E. RECOMMENDATIONS:    1. Continue medical management.  2. Cardiac rehab referral.  3. Weight loss.  4. Follow up with Dr. Familia Tate.  5. Schedule for PCI.  6. - Hydration overnight, eGFR 47.  7. - Dr. Gallegos consulted for PCI of RCA  8. - On chronic  "warfarin Rx, on hold for now  9. - Start  mg today and daily for total of 5 days as warfarin is resumed post PCI  10. - Start Plavix, load with 300 mg today then 75 mg daily in addition to warfarin.    PCI INTERVENTION:    Proximal RCA:  The lesion was successfully intervened. Post-stenosis of 0% and post-DAREK 3 flow. The vessel was accessed natively. The following items were used: Stent Resolute Rx 4.00x30 (JAYCEE).    C. SUMMARY:    1. Successful PCI/JAYCEE of the proximal RCA.    D. RECOMMENDATIONS:    1. Routine post PCI care.  2. Risk factor reductions.  3. ASA 81mg.  4. Clopidogrel (Plavix) for one year.  5. Weight loss.    Since visit of 1/22/2015, no CP nor SOB. Been compliant with medications, no problem. Noted venous ulcer in the left leg about a week ago. Dressing with peroxide. Lab from [facility] reviewed, K+ remains 5.5 with stable Cr of 1.78, eGFR 39. Active with rental properties upkeep.     Since visit of 3/27, no new problem, difficulties in getting lab results from [facility]. Reviewed eGFR 39, K+ 5.5, will need to stay with low K+ diet. Problem with venous stasis ulceration in the left leg. Worked with Wound Care for 2 weeks, told to return if problem.     Since visit of 4/23,no new problem, feeling "pretty good". In process of selling rental properties. Active with walking about 4 miles a day, takes about an hour. Legs better with ACE stocking. Review lab, [facility] on 5/15/2015 K+ 4.7, stable renal function with eGFR 41, CBC in 1/2015 Hgb 11.8, last Ferritin level in 4/2011.  ECHO, 5/2015, CONCLUSIONS   1 - Moderately enlarged aortic root. measuring 4.1 cm at sinotubular junction.  2 - Concentric hypertrophy. Wall thickness is increased, with the septum and the posterior wall each measuring 1.4 cm across.  3 - Low normal to mildly depressed left ventricular systolic function (EF 50-55%).   4 - Normal left ventricular diastolic function.   5 - Right ventricular enlargement with normal systolic function.   6 - " Difficult windows, Optison contrast used for wall motion analysis.   7 - Suggestion for some improvement in LVEF from Echo on 5/7/2014.     Since visit of 5/29/2015, more active, compliant with medications, PT twice weekly for an hour, no problem. Last BMP in 5/2015, K+ 4.7, Cr 1.7, eGFR 41. Under care of Dr. Torrez for venous stasis and ulcer. Uses support hose occasionally due to the hot weather.    Since visit of 9/10/2015, no new problem, less stress, no regular exercise. Had completed phase II Cardiac Rehab. Asked to consider phase III. Home BP is good, 135. Citlali fixes medications, don't skip.    Since visit of 2/8/2016, no new problem. Undergoing urologic evaluation for kidney mass with biopsies of the bladder, prostate, and the right kidney, no problem with the procedure, awaiting results. ECG shows RBBB, no problem with medications, no regular exercise but considering to start. Decline stress test, no CP nor SOB.     In 12/2016, first concern is chronic fatigue, received 42 radiation Rx, have nocturia 3X nightly which interrupt the sleep, gets only about 5 hours. Will review with upcoming visit with Urologist. Stay active, able to walk 2 blocks with can, also uses free weights 2X weekly, for 15-20 minutes. Have DORA, uses CPAP 100%. For past 2 months had 2 episode of chest pain under emotional stress, last up to 15 minutes, max grade 3/10, have not use any NTG. Chart reviewed - last nuclear stress test in 12/2014. Had JAYCEE placed in 1/2015. Last lipid is excellent, LDL 57, non-HDL 75. Weight stable at around 294 lbs.   Echo, 6/2016 CONCLUSIONS     1 - Mild left ventricular enlargement.     2 - Eccentric hypertrophy.     3 - Moderately depressed left ventricular systolic function (EF 35-40%).     4 - Normal left ventricular diastolic function.     5 - Right ventricular enlargement with normal systolic function.     6 - The estimated PA systolic pressure is greater than 25 mmHg.     7 - Mild tricuspid  regurgitation.     8 - Trivial to mild aortic regurgitation.     9 - Suggestion for deterioration in LVEF from Echo in 5/2015.     10 - Difficult windows, Optison contrast used for wall motion analysis.     Carotid US  Consider repeat study in six months.    CONCLUSIONS   There is 40 - 49% right Internal Carotid stenosis.  There is 20 - 39% left Internal Carotid stenosis.    Antegrade flows in the vertebral arteries. Homogenous plaques noted in both ICAs.    In 2/2017, active with rental, feels tired all the time, using CPAP 100%, wake up feeling all right then tired after 2-3 hours. Was using hydrocodone bid for chronic pains, out of medications for 3 weeks, feels more tired off the narcotic. Explained need for regular exercise with muscle strengthening.  Lexiscan - Nuclear Quantitative Functional Analysis:   LVEF: 42 % (normal is 47 - 59)  LVED Volume: 124 ml (normal is 91 - 155)  LVES Volume: 72 ml (normal is 40 - 78)    Impression: ABNORMAL MYOCARDIAL PERFUSION  1. There is moderate intensity fixed defects in the lateral and inferolateral walls of the left ventricle, consistent with myocardial injury. There is trivial francine-injury ischemia.   2. There is abnormal wall motion at rest showing moderate hypokinesis of the inferolateral and inferior walls of the left ventricle.   3. There is resting LV dysfunction with a reduced ejection fraction of 42 %.  (normal is 47 - 59)  4. The ventricular volumes are normal at rest and stress.   5. The extracardiac distribution of radioactivity is normal.   6. When compared to the previous study from 12/15/2014, the inferolateral defects now appears fixed.  7. [facility] SSS=15, SRS=14, and SDS=1, suggest that only 1% of myocardium may be ischemic.    Carotid US - CONCLUSIONS   There is 20 - 39% right Internal Carotid stenosis.  There is 0 - 19% left Internal Carotid stenosis.    Homogenous plaques noted in the ICAs, antegrade flows in the vertebral  arteries.    In 7/2017,  here supposedly for HTN but not certain, also planning to do RF nerve ablation next Thursday with Dr. Causey, no surgical clearance requested. BP in PCP office was 112/60. Compliant with medications. Also have started using BiPAP every night for DORA, feel better. Denies any CP nor SOB. Active with property management and HA. ECG shows NSR, RBBB.     In 1/2018, no cardiac symptom, no heart worries. Very few home BP. Remain active using the cane, limited by loss of balance due to neuropathy of the LE. Fallen twice, due to the shoe, uses cane as needed. One episode, hit nose with bleeding. Labs reviewed from 9/2017: LDL 51.4, A1C 6.7%, anemia Hgb 9.8, CKD stage 3, eGFR 43   Echo in 8/2017 - CONCLUSIONS     1 - Eccentric hypertrophy.     2 - Moderately depressed left ventricular systolic function (EF 35-40%).     3 - Regional wall motion analysis consistent with ischemic disease.     4 - Impaired LV relaxation, normal LAP (grade 1 diastolic dysfunction).     5 - Trivial to mild mitral regurgitation.     6 - Right ventricular enlargement with normal systolic function.     7 - The estimated PA systolic pressure is 26 mmHg.     8 - No definite change from Echo in 6/2016.     In 7/2018, occasional electricity in the heart, very brief. ECG in NSR, 62 bpm, PVC and RBBB. No CP, stay active dealing with a lot friends' problem, he is the problem solver, very stressful.  Carotid US 2/2017  CONCLUSIONS   There is 0 - 19% right Internal Carotid stenosis.  There is 0 - 19% left Internal Carotid stenosis.    Homogenous plaques in the ICAs with antegrade flows in the vertebral arteries.    Holter - Conclusion   · NSR with occasional mostly monomorphic PVC, 3000, about 2% of complexes, rare PAC  · No symptom reported        In 1/2019, return for 6 months review. No heart worries, no cardiac symptoms. Trying to be active, walking a problem with leg weakness and dizziness. Considered high fall risk. Fairly sedentary. LDL 8/2018, 50. Lots of  "agitation from the renters.   Echo 8/2018 - The left ventricle cavity is normal.  · Left atrium is moderately dilated.  · Tricuspid valve shows mild regurgitation.  · Left ventricle ejection fraction is low normal at 51%  · Normal LV diastolic function.  · RV systolic function is normal.  · Improved LVEF from Echo in 8/2017     Difficult windows, Lumason contrast used for wall motion analysis.    In 7/2019, here for 6 months review, remain pretty limited but no symptom. No heart worries.    HPI Comments: in 10/2019, here for post DC review,   DCS "75 y/o male who has a PMH of CKD 3 , HTN,P  A. FIB , CHF with EF of 51%,, CAD, Gout , DORA, and Prostate cancer in the past who  presented with the above concerns. He has recently started on new medication Namenda. He had labs , UA, CT Head , and Chest X-ray. He was noted to have EMMY on CKD , Hyperkalemia , and concerns of foul smelling urine at home. He was admitted to the hospital for concerns of weakness, dizziness , and increase confusion for the last 2 days. He had  Hyperkalemia 5.7, and metabolic encephalopathy He failed outpatient treatment as he was seen in the ER day before admission, with negative work up.  still has hyperkalemia,started again on Kayexalate.improved,ARF on CKD 3 improved,did well with PT,OT and ar DC time HH is arranged,mental status was back to his baseline.  Patient has been discharged home with no ACE,or ARB duo to hyperkalemia and worsening CKD and follow up with PCP in next few days."    Daughter report elevated BP post DC, >150/76. Feeling fine.       Review of Systems   Constitution: Negative for diaphoresis, fever, some weakness, and malaise/fatigue, no night sweats and but weight stable 1/2019.  HENT: Negative for headaches, nosebleeds and tinnitus.   Eyes: Negative for visual disturbance. Have watery eyes, photophobia  Cardiovascular: Positive for dyspnea on exertion and chest pain with emotional stress. Negative claudication, cyanosis, " irregular heartbeat, leg swelling, near-syncope, orthopnea, palpitations and paroxysmal nocturnal dyspnia.   Respiratory: Negative for cough, shortness of breath, sleep disturbances due to breathing, snoring and wheezing. [place] score 13 down to a 6 on CPAP 100% use  Endocrine: Negative for polydipsia and polyuria.   Hematologic/Lymphatic: Does not bruise/bleed easily.   Skin: Negative for nail changes, color change, flushing, poor wound healing and suspicious lesions. ecchymosis on ASA especially with the cat, and warfarin  Musculoskeletal: Positive for arthritis, back pain and muscle cramps on daily Xanax 1 mg. Negative for falls, gout, joint pain, joint swelling,   Bilateral hip replacements (right 1996, left 2001), right knee arthroscopic procedure 1996.  Have muscle weakness and myalgias in hip and legs, also muscle cramps in the hands, and legs at night.  Gastrointestinal: Negative for heartburn, hematemesis, hematochezia, melena and nausea. Have no bloating, have constipation and excessive gas.  Neurological: Negative for disturbances in coordination, have excessive daytime sleepiness, dizziness, focal weakness in both LE, occasional light-headedness, have numbness, occasional loss of balance, no vertigo.   Psychiatric/Behavioral: Negative for depression and substance abuse. The patient is nervous/anxious and stressed with rentals and memory loss. The patient does not have insomnia.        Objective:     Physical Exam   Constitutional: He is oriented to person, place, and time. He appears well-developed and well-nourished.   HENT:   Head: Normocephalic.   Eyes: Conjunctivae and EOM are normal. Pupils are equal, round, and reactive to light.   Neck: Normal range of motion. Neck supple. No JVD present. No thyromegaly present.   Cardiovascular: regular rate, regular rhythm, soft heart sounds and intact distal pulses. Exam reveals no gallop and no friction rub.   No murmur heard.  No swelling.   Pulmonary/Chest:  "Effort normal. He has no wheezes. He has no rales. He exhibits no tenderness.   Abdominal: Soft. Bowel sounds are normal. There is no tenderness.  Protuberance, waist circumference 57.5 inches increased to 58" , hip 55 inches.   Musculoskeletal: Normal range of motion. He exhibits No tenderness (mild behind the left knee.). He exhibits 1+ edema in left lower extremities to the knee.   Lymphadenopathy:   He has no cervical adenopathy.   Neurological: He is alert and oriented to person, place, and time.   Skin: Skin is warm and dry. No rash noted.   Venous stasis, bilateral with stasis dermatitis with multiple scratches.        Assessment:    Richard was seen today for [facility] f/u.    Diagnoses and all orders for this visit:    Hypertension, unspecified type  -     EKG 12-lead  -     lisinopril (PRINIVIL,ZESTRIL) 40 MG tablet; Take 1 tablet (40 mg total) by mouth once daily.    Acute renal failure superimposed on stage 3 chronic kidney disease, unspecified acute renal failure type    Hyperkalemia    Anemia, chronic disease    Chronic systolic congestive heart failure  -     lisinopril (PRINIVIL,ZESTRIL) 40 MG tablet; Take 1 tablet (40 mg total) by mouth once daily.  -     Brain natriuretic peptide; Future    CKD (chronic kidney disease) stage 3, GFR 30-59 ml/min, 41  -     lisinopril (PRINIVIL,ZESTRIL) 40 MG tablet; Take 1 tablet (40 mg total) by mouth once daily.  -     Basic metabolic panel; Future  -     Basic metabolic panel; Future    Type 2 diabetes mellitus with stage 3 chronic kidney disease, without long-term current use of insulin  -     lisinopril (PRINIVIL,ZESTRIL) 40 MG tablet; Take 1 tablet (40 mg total) by mouth once daily.    History of MI (myocardial infarction), 2010    Pure hypercholesterolemia    Hypertension associated with diabetes    Hypertensive left ventricular hypertrophy, without heart failure    LV dysfunction, EF 40%  -     lisinopril (PRINIVIL,ZESTRIL) 40 MG tablet; Take 1 tablet (40 mg " total) by mouth once daily.    Long term (current) use of anticoagulants    Morbid obesity, today BMI 43.7    Paroxysmal atrial fibrillation    Sedentary lifestyle    Obstructive sleep apnea syndrome    Status post insertion of drug eluting coronary artery stent, 3x, last RCA 1/2015    Elevated brain natriuretic peptide (BNP) level  -     lisinopril (PRINIVIL,ZESTRIL) 40 MG tablet; Take 1 tablet (40 mg total) by mouth once daily.  -     Brain natriuretic peptide; Future        Plan:      - All medical issues reviewed, will restart Lisinopril 40 mg daily and monitor BMP and BNP  - CV status stable, all medications reviewed, patient acknowledge good understanding.  - Recommend healthy living: healthy diet and regular exercise aiming for fitness, and weight control  - Any activity is better than none.  - Check home blood pressure, 2 days weekly, do 2 readings within 5 minutes in AM and PM, keep log for review.  - Weigh twice weekly, try to lose 1-2 lbs per week  - Highly recommend 30 minutes of exercise daily, can have Sunday off, with 2-3 sessions of muscle strengthening weekly. A  would be very helpful.  - Recommend at least biannual cardiovascular evaluation in view of his significant risk factors. Patient's preference        Patient Active Problem List   Diagnosis    Diabetes mellitus with chronic kidney disease, stage III    MTHFR mutation (methylenetetrahydrofolate reductase)    Sleep apnea, using BiPAP nightly, 1999, last sleep test in 2013    Hypertension associated with diabetes    Atherosclerosis of native coronary artery with angina pectoris    Hyperlipidemia, baseline     Gout    GERD (gastroesophageal reflux disease)    Hypercoagulable state, Prothrombin mutation    Colon polyps    Anxiety    Chronic back pain    Morbid obesity, today BMI 43.7    Carotid disease, bilateral    Aortic aneurysm, thoracic, 4.3 cm, 8/2010    GARY (generalized anxiety disorder)    CKD  (chronic kidney disease) stage 3, GFR 30-59 ml/min, 41    Abnormal cardiovascular stress test    LV dysfunction, EF 40%    Long term (current) use of anticoagulants    OA (osteoarthritis). post bilateral THR, 2001    Diabetic neuropathy    H/O bariatric surgery, 2008    Osteoarthritis, knee    BPH with obstruction/lower urinary tract symptoms    Weakness of both lower extremities    Proteinuria    Paroxysmal atrial fibrillation    Sedentary lifestyle    Stasis dermatitis of both legs    Type 2 diabetes mellitus with diabetic nephropathy    Hypertensive left ventricular hypertrophy, without heart failure    Prostate cancer    Sleep deprivation    Chronic fatigue    Ulcer of toe of left foot    Peripheral vascular disease    Facet arthropathy    Anemia, chronic disease    Uncomplicated opioid dependence    Chronically on benzodiazepine therapy    MCI (mild cognitive impairment)    Closed nondisplaced fracture of base of fourth metacarpal bone of left hand    Diabetic ulcer of toe of right foot associated with type 2 diabetes mellitus, with fat layer exposed    Ulcer of right lower leg, limited to breakdown of skin    Right foot ulcer, limited to breakdown of skin    Pain in right foot    Cardiomyopathy    History of MI (myocardial infarction), 2010    Status post insertion of drug eluting coronary artery stent, 3x, last RCA 1/2015    Encephalopathy acute    Hyperkalemia    Chronic systolic congestive heart failure    Acute renal failure superimposed on stage 3 chronic kidney disease    Elevated brain natriuretic peptide (BNP) level     Total face-to-face time with the patient was 40 minutes and greater than 50% was spent in counseling and coordination of care. The above assessment and plan have been discussed at length. Hospital notes and family note reviewed. Labs and procedure over the last 6 months reviewed. Problem List updated. Asked to bring in all active medications / pills  bottles with next visit.

## 2019-10-25 DIAGNOSIS — E11.9 TYPE 2 DIABETES MELLITUS WITHOUT COMPLICATION, UNSPECIFIED WHETHER LONG TERM INSULIN USE: ICD-10-CM

## 2019-10-28 ENCOUNTER — TELEPHONE (OUTPATIENT)
Dept: HEMATOLOGY/ONCOLOGY | Facility: CLINIC | Age: 74
End: 2019-10-28

## 2019-10-28 NOTE — TELEPHONE ENCOUNTER
Dr. Tate is the ordering physician     ----- Message from Kai Ortiz MD sent at 10/28/2019 12:30 PM CDT -----  I dont think we are the ones monitoring his coumadin

## 2019-10-29 ENCOUNTER — OFFICE VISIT (OUTPATIENT)
Dept: FAMILY MEDICINE | Facility: CLINIC | Age: 74
End: 2019-10-29
Payer: MEDICARE

## 2019-10-29 VITALS
HEART RATE: 78 BPM | DIASTOLIC BLOOD PRESSURE: 70 MMHG | TEMPERATURE: 99 F | SYSTOLIC BLOOD PRESSURE: 130 MMHG | WEIGHT: 301.81 LBS | BODY MASS INDEX: 44.57 KG/M2

## 2019-10-29 DIAGNOSIS — Z15.89 MTHFR MUTATION (METHYLENETETRAHYDROFOLATE REDUCTASE): ICD-10-CM

## 2019-10-29 DIAGNOSIS — Z79.01 LONG TERM (CURRENT) USE OF ANTICOAGULANTS: ICD-10-CM

## 2019-10-29 DIAGNOSIS — G89.29 CHRONIC BILATERAL BACK PAIN, UNSPECIFIED BACK LOCATION: ICD-10-CM

## 2019-10-29 DIAGNOSIS — I25.2 HISTORY OF MI (MYOCARDIAL INFARCTION): ICD-10-CM

## 2019-10-29 DIAGNOSIS — E11.59 HYPERTENSION ASSOCIATED WITH DIABETES: ICD-10-CM

## 2019-10-29 DIAGNOSIS — I15.2 HYPERTENSION ASSOCIATED WITH DIABETES: ICD-10-CM

## 2019-10-29 DIAGNOSIS — Z09 HOSPITAL DISCHARGE FOLLOW-UP: Primary | ICD-10-CM

## 2019-10-29 DIAGNOSIS — N18.30 CKD (CHRONIC KIDNEY DISEASE) STAGE 3, GFR 30-59 ML/MIN: ICD-10-CM

## 2019-10-29 DIAGNOSIS — M54.9 CHRONIC BILATERAL BACK PAIN, UNSPECIFIED BACK LOCATION: ICD-10-CM

## 2019-10-29 DIAGNOSIS — E87.5 HYPERKALEMIA: ICD-10-CM

## 2019-10-29 DIAGNOSIS — I50.22 CHRONIC SYSTOLIC CONGESTIVE HEART FAILURE: ICD-10-CM

## 2019-10-29 DIAGNOSIS — Z91.89 SEDENTARY LIFESTYLE: ICD-10-CM

## 2019-10-29 PROCEDURE — 3078F PR MOST RECENT DIASTOLIC BLOOD PRESSURE < 80 MM HG: ICD-10-PCS | Mod: CPTII,S$GLB,, | Performed by: NURSE PRACTITIONER

## 2019-10-29 PROCEDURE — 3044F PR MOST RECENT HEMOGLOBIN A1C LEVEL <7.0%: ICD-10-PCS | Mod: CPTII,S$GLB,, | Performed by: NURSE PRACTITIONER

## 2019-10-29 PROCEDURE — 99214 OFFICE O/P EST MOD 30 MIN: CPT | Mod: S$GLB,,, | Performed by: NURSE PRACTITIONER

## 2019-10-29 PROCEDURE — 99499 UNLISTED E&M SERVICE: CPT | Mod: S$GLB,,, | Performed by: NURSE PRACTITIONER

## 2019-10-29 PROCEDURE — 1101F PT FALLS ASSESS-DOCD LE1/YR: CPT | Mod: CPTII,S$GLB,, | Performed by: NURSE PRACTITIONER

## 2019-10-29 PROCEDURE — 3078F DIAST BP <80 MM HG: CPT | Mod: CPTII,S$GLB,, | Performed by: NURSE PRACTITIONER

## 2019-10-29 PROCEDURE — 99999 PR PBB SHADOW E&M-EST. PATIENT-LVL III: CPT | Mod: PBBFAC,,, | Performed by: NURSE PRACTITIONER

## 2019-10-29 PROCEDURE — 99999 PR PBB SHADOW E&M-EST. PATIENT-LVL III: ICD-10-PCS | Mod: PBBFAC,,, | Performed by: NURSE PRACTITIONER

## 2019-10-29 PROCEDURE — 3044F HG A1C LEVEL LT 7.0%: CPT | Mod: CPTII,S$GLB,, | Performed by: NURSE PRACTITIONER

## 2019-10-29 PROCEDURE — 1101F PR PT FALLS ASSESS DOC 0-1 FALLS W/OUT INJ PAST YR: ICD-10-PCS | Mod: CPTII,S$GLB,, | Performed by: NURSE PRACTITIONER

## 2019-10-29 PROCEDURE — 3075F PR MOST RECENT SYSTOLIC BLOOD PRESS GE 130-139MM HG: ICD-10-PCS | Mod: CPTII,S$GLB,, | Performed by: NURSE PRACTITIONER

## 2019-10-29 PROCEDURE — 99499 RISK ADDL DX/OHS AUDIT: ICD-10-PCS | Mod: S$GLB,,, | Performed by: NURSE PRACTITIONER

## 2019-10-29 PROCEDURE — 99214 PR OFFICE/OUTPT VISIT, EST, LEVL IV, 30-39 MIN: ICD-10-PCS | Mod: S$GLB,,, | Performed by: NURSE PRACTITIONER

## 2019-10-29 PROCEDURE — 3075F SYST BP GE 130 - 139MM HG: CPT | Mod: CPTII,S$GLB,, | Performed by: NURSE PRACTITIONER

## 2019-10-29 NOTE — PATIENT INSTRUCTIONS
Dehydration (Adult)  Dehydration occurs when your body loses too much fluid. This may be the result of prolonged vomiting or diarrhea, excessive sweating, or a high fever. It may also happen if you dont drink enough fluid when youre sick or out in the heat. Misuse of diuretics (water pills) can also be a cause.  Symptoms include thirst and decreased urine output. You may also feel dizzy, weak, fatigued, or very drowsy. The diet described below is usually enough to treat dehydration. In some cases, you may need medicine.  Home care  · Drink at least 12 8-ounce glasses of fluid every day to resolve the dehydration. Fluid may include water; orange juice; lemonade; apple, grape, or cranberry juice; clear fruit drinks; electrolyte replacement and sports drinks; and teas and coffee without caffeine. If you have been diagnosed with a kidney disease, ask your doctor how much and what types of fluids you should drink to prevent dehydration. If you have kidney disease, fluid can build up in the body. This can be dangerous to your health.  · If you have a fever, muscle aches, or a headache as a result of a cold or flu, you may take acetaminophen or ibuprofen, unless another medicine was prescribed. If you have chronic liver or kidney disease, or have ever had a stomach ulcer or gastrointestinal bleeding, talk with your health care provider before using these medicines. Don't take aspirin if you are younger than 18 and have a fever. Aspirin raises the chance for severe liver injury.  Follow-up care  Follow up with your health care provider, or as advised.  When to seek medical advice  Call your health care provider right away if any of these occur:  · Continued vomiting  · Frequent diarrhea (more than 5 times a day); blood (red or black color) or mucus in diarrhea  · Blood in vomit or stool  · Swollen abdomen or increasing abdominal pain  · Weakness, dizziness, or fainting  · Unusual drowsiness or confusion  · Reduced urine  output or extreme thirst  · Fever of 100.4°F (34°C) or higher  Date Last Reviewed: 5/31/2015  © 2204-2392 [employer]. [address] [place] [address], [place] [address]. All rights reserved. This information is not intended as a substitute for professional medical care. Always follow your healthcare professional's instructions.

## 2019-10-29 NOTE — PROGRESS NOTES
Subjective:       Patient ID: Richard Bustos is a 74 y.o. male.    Chief Complaint: Follow-up (post hospital )    Richard Bustos, 74 year old patient of Dr. Ramirez , who presents today at hospital follow up appointment.    Pt seen today is new to me.      Hospital records, including laboratory, radiologic, and other tests performed during the stay, have been obtained and reviewed.  Pt was discharged from the hospital on 10/18/2019 and was contacted for follow up appointment within 48 hours.  Appointment is within 7 days of the discharge.    Pt was hospitalized 1 days for encephalopathy acute, hyperkalemia, acute renal failure superimposed on stage 3 chronic kidney disease,chronic systolic congestive heart failure, weakness of both lower extremities, paroxysmal atrial fibrillation, CKD (chronic kidney disease) stage 3, GFR 30-59 ml/min, 41, sleep apnea, using BiPAP nightly, 1999, last sleep test in 2013.    reason(s).  He was sent home with home health. He feels improved. He needs an xlarge commode. He has been drinking fluids. He was seen by his cardiologist and was restarted on his lisinopril.     Home health with [facility].   Patient Name: Richard Bustos  MRN: [id number]  Admission Date: 10/18/2019  Hospital Length of Stay: 1 days  Discharge Date and Time:  10/21/2019 10:13 AM  Attending Physician: Santy Wilkerson MD   Discharging Provider: Santy Wilkerson MD  Primary Care Provider: Familia Ramirez Jr, MD        HPI:   Mr. Bustos is a pleasant 75 y/o male who has a PMH of CKD 3 , HTN,P  A. FIB , CHF with EF of 51%,, CAD, Gout , DORA, and Prostate cancer in the past who  presented with the above concerns. He has recently started on new medication Namenda. He had labs , UA, CT Head , and Chest X-ray. He was noted to have EMMY on CKD , Hyperkalemia , and concerns of foul smelling urine at home. He was admitted to the hospital for concerns of weakness, dizziness , and increase confusion for the last 2  days. He will be admitted for EMMY on CKD 3, Hyperkalemia 5.7, and metabolic encephalopathy He failed outpatient treatment as he was seen in the ER day before admission, with negative work up.     * No surgery found *       Hospital Course:   Mr. Bustos is a pleasant 73 y/o male who has a PMH of CKD 3 , HTN,P  A. FIB , CHF with EF of 51%,, CAD, Gout , DORA, and Prostate cancer in the past who  presented with the above concerns. He has recently started on new medication Namenda. He had labs , UA, CT Head , and Chest X-ray. He was noted to have EMMY on CKD , Hyperkalemia , and concerns of foul smelling urine at home. He was admitted to the hospital for concerns of weakness, dizziness , and increase confusion for the last 2 days. He had  Hyperkalemia 5.7, and metabolic encephalopathy He failed outpatient treatment as he was seen in the ER day before admission, with negative work up.  still has hyperkalemia,started again on Kayexalate.improved,ARF on CKD 3 improved,did well with PT,OT and ar DC time HH is arranged,mental status was back to his baseline.  Patient has been discharged home with no ACE,or ARB duo to hyperkalemia and worsening CKD and follow up with PCP in next few days.      Consults:      No new Assessment & Plan notes have been filed under this hospital service since the last note was generated.  Service: Hospital Medicine     Final Active Diagnoses:    Diagnosis Date Noted POA   PRINCIPAL PROBLEM:  Encephalopathy acute (G93.40) 10/18/2019 Yes   Hyperkalemia (E87.5) 10/19/2019 Yes   Acute renal failure superimposed on stage 3 chronic kidney disease (N17.9, N18.3) 10/19/2019 Yes   Chronic systolic congestive heart failure (I50.22) 10/19/2019 Yes   Weakness of both lower extremities (R29.898) 11/21/2014 Yes   Paroxysmal atrial fibrillation (I48.0) 11/21/2014 Yes   CKD (chronic kidney disease) stage 3, GFR 30-59 ml/min, 41 (N18.3) 01/23/2013 Yes   Sleep apnea, using BiPAP nightly, 1999, last sleep test  in 2013 (G47.30) 12/16/2011 Yes      Chronic     Problems Resolved During this Admission:        Discharged Condition: stable     Disposition: Home-Health Care [facility]     Follow Up:  Follow-up Information       Familia Ramirez Jr, MD In 1 week.   Specialty:  Family Medicine  Contact information:  [address]  [place] [address]  [phone]                     Patient Instructions:      Activity as tolerated        Significant Diagnostic Studies: Labs:   BMP:   Recent Labs  Lab 10/20/19  0414 10/20/19  1559 10/21/19  0442   155* 111   140 142  K 5.6* 4.9 3.5   109 113*  CO2 22* 21* 24  BUN 52* 50* 44*  CREATININE 2.2* 2.0* 1.7*  CALCIUM 8.6 8.5* 8.2*   and CBC   Recent Labs  Lab 10/20/19  0414 10/21/19  0442  WBC 7.10 5.60  HGB 9.6* 8.8*  HCT 29.4* 26.4*   165     Radiology: X-Ray: CXR: X-Ray Chest 1 View (CXR): No results found for this visit on 10/18/19. and X-Ray Chest PA and Lateral (CXR): No results found for this visit on 10/18/19.     Pending Diagnostic Studies:     None        Medications:  Reconciled Home Medications:      Medication List     START taking these medications   amLODIPine 10 MG tablet  Commonly known as:  NORVASC  Take 1 tablet (10 mg total) by mouth once daily.        CHANGE how you take these medications   warfarin 4 MG tablet  Commonly known as:  COUMADIN  Take 1 tablet (4 mg total) by mouth Daily.  What changed:    · medication strength  · how much to take        CONTINUE taking these medications   allopurinol 100 MG tablet  Commonly known as:  ZYLOPRIM  TAKE 1 TABLET(100 MG) BY MOUTH EVERY DAY     ALPRAZolam 0.5 MG tablet  Commonly known as:  XANAX  Take 1 tablet (0.5 mg total) by mouth nightly as needed for Anxiety.     atorvastatin 80 MG tablet  Commonly known as:  LIPITOR  Take 1 tablet (80 mg total) by mouth once daily.     baclofen 10 MG tablet  Commonly known as:  LIORESAL  Take 1 tablet (10 mg total) by mouth 2 (two) times daily. For muscle  spasms     calcitRIOL 0.5 MCG Cap  Commonly known as:  ROCALTROL  TK 1 C PO D     calcium carbonate 400 mg calcium (1,000 mg) Chew  Commonly known as:  TUMS ULTRA  Take 1 tablet (400 mg total) by mouth once daily.     carvedilol 25 MG tablet  Commonly known as:  COREG  Take 1 tablet (25 mg total) by mouth 2 (two) times daily with meals.     CENTRUM SILVER ORAL  Take 1 tablet by mouth once daily.     clopidogrel 75 mg tablet  Commonly known as:  PLAVIX  TAKE 1 TABLET BY MOUTH DAILY     ferrous sulfate 325 mg (65 mg iron) Tab tablet  Commonly known as:  FEOSOL  TAKE 1 TABLET BY MOUTH TWICE DAILY     fluticasone propionate 50 mcg/actuation nasal spray  Commonly known as:  FLONASE  2 sprays (100 mcg total) by Each Nare route once daily.     * FOSAMAX PLUS D 70 mg- 2,800 unit per tablet  Generic drug:  alendronate-vitamin D3  TK 1 T PO Q 7 DAYS     * FOSAMAX PLUS D 70 mg- 2,800 unit per tablet  Generic drug:  alendronate-vitamin D3  TAKE 1 TABLET BY MOUTH EVERY 7 DAYS     furosemide 40 MG tablet  Commonly known as:  LASIX  TAKE 1 TABLET BY MOUTH DAILY AS NEEDED     HYDROcodone-acetaminophen 7.5-325 mg per tablet  Commonly known as:  NORCO  Take 1 tablet by mouth every 6 (six) hours as needed for Pain.     hydrocortisone 2.5 % cream  Apply topically 2 (two) times daily. for 10 days     * L-METHYL-B6-B12 3-35-2 mg Tab  Generic drug:  mecobal-levomefolat Ca-B6 phos  TAKE 1 TABLET BY MOUTH TWICE DAILY     * mecobal-levomefolat Ca-B6 phos 3-35-2 mg Tab  Commonly known as:  L-METHYL-B6-B12  Take 1 tablet by mouth 2 (two) times daily.     magnesium oxide 400 mg (241.3 mg magnesium) tablet  Commonly known as:  MAG-OX  TAKE 1 TABLET BY MOUTH EVERY DAY     memantine 10 MG Tab  Commonly known as:  NAMENDA  Take 1 tablet (10 mg total) by mouth 2 (two) times daily.     mirabegron 50 mg Tb24  Commonly known as:  MYRBETRIQ  Take 1 tablet (50 mg total) by mouth once daily.     nitroGLYCERIN 0.4 MG/DOSE TL SPRY 400 mcg/spray  spray  Commonly known as:  NITROLINGUAL  PLACE 1 SPRAY UNDER THE TONGUE EVERY 5 MINUTES AS NEEDED FOR CHEST PAIN     OCUVITE 945-16-2-150 mg-unit-mg-mg Cap  Generic drug:  vit C-vit E-lutein-min-om-3  Take 1 capsule by mouth once daily.     omeprazole 20 MG capsule  Commonly known as:  PRILOSEC  TAKE 1 CAPSULE BY MOUTH DAILY     paroxetine 20 MG tablet  Commonly known as:  PAXIL  Take 1 tablet (20 mg total) by mouth every morning.     ranitidine 150 MG tablet  Commonly known as:  ZANTAC  TAKE 1 TABLET(150 MG) BY MOUTH TWICE DAILY         * This list has 4 medication(s) that are the same as other medications prescribed for you. Read the directions carefully, and ask your doctor or other care provider to review them with you.       STOP taking these medications   lisinopril 40 MG tablet  Commonly known as:  PRINIVIL,ZESTRIL          Review of Systems   Constitutional: Negative for chills, fatigue and fever.   HENT: Negative for congestion, ear pain, sinus pressure and sore throat.    Respiratory: Negative for shortness of breath and wheezing.    Cardiovascular: Negative for chest pain and leg swelling.   Gastrointestinal: Negative for abdominal distention and abdominal pain.   Genitourinary: Negative for dysuria and flank pain.   Skin: Negative for color change and pallor.   Neurological: Negative for light-headedness, numbness and headaches.       Objective:      Physical Exam   Constitutional: He is oriented to person, place, and time. He appears well-developed and well-nourished.   Morbidly obese male   HENT:   Head: Normocephalic and atraumatic.   Right Ear: External ear normal.   Left Ear: External ear normal.   Eyes: Pupils are equal, round, and reactive to light. Conjunctivae and EOM are normal.   Neck: Normal range of motion. Neck supple.   Cardiovascular: Normal rate and regular rhythm.   Pulmonary/Chest: Effort normal and breath sounds normal.   Abdominal: Soft. Bowel sounds are normal.   Musculoskeletal:  Normal range of motion.   Neurological: He is alert and oriented to person, place, and time.   Skin: Skin is warm and dry.   Nursing note and vitals reviewed.      Assessment:       1. Hospital discharge follow-up    2. Chronic bilateral back pain, unspecified back location    3. Sedentary lifestyle    4. Hypertension associated with diabetes    5. Chronic systolic congestive heart failure    6. CKD (chronic kidney disease) stage 3, GFR 30-59 ml/min, 41    7. Long term (current) use of anticoagulants    8. MTHFR mutation (methylenetetrahydrofolate reductase)    9. History of MI (myocardial infarction), 2010    10. Hyperkalemia        Plan:       Richard was seen today for follow-up.    Diagnoses and all orders for this visit:    Hospital discharge follow-up  -     COMMODE FOR HOME USE    Chronic bilateral back pain, unspecified back location  -     Ambulatory referral to Pain Clinic    Sedentary lifestyle  Discussed diet and exercise.   Hypertension associated with diabetes  Stable on current medications. Followed by cardiology.    Chronic systolic congestive heart failure  Stable on current medications. Followed by cardiology.     CKD (chronic kidney disease) stage 3, GFR 30-59 ml/min, 41  Will follow up on labs ordered by cardiology.     Long term (current) use of anticoagulants  Stable. Followed by cardiology.    MTHFR mutation (methylenetetrahydrofolate reductase)  Followed by Hematology.    History of MI (myocardial infarction), 2010  Followed by cardiology.    Hyperkalemia  Labs ordered by cardiologist.    Discussed worsening signs/symptoms and when to return to clinic or go to ED.   Patient expresses understanding and agrees with treatment plan.

## 2019-11-05 ENCOUNTER — TELEPHONE (OUTPATIENT)
Dept: FAMILY MEDICINE | Facility: CLINIC | Age: 74
End: 2019-11-05

## 2019-11-05 ENCOUNTER — TELEPHONE (OUTPATIENT)
Dept: HEMATOLOGY/ONCOLOGY | Facility: CLINIC | Age: 74
End: 2019-11-05

## 2019-11-05 NOTE — TELEPHONE ENCOUNTER
----- Message from Staci Bonilla sent at 11/5/2019  8:16 AM CST -----  Type: Needs Medical Advice    Who Called: pt  Daughter  lisa Benitez Call Back Number: [phone]  Additional Information:  Calling to  See if carlos  On 11/22 is  neccessary  // please call  For details

## 2019-11-05 NOTE — TELEPHONE ENCOUNTER
Advised that patient needs to be seen Q3 months for med refill. Advised that last appt on 10/29/19 was his hospital follow up and his appointment on 11/22/19 will be his regular follow up. Understanding verbalized.

## 2019-11-05 NOTE — TELEPHONE ENCOUNTER
Patient states that Dr. Tate is adjusting his coumadin.     ----- Message from Kai Ortiz MD sent at 11/5/2019  1:36 PM CST -----  Do we manage his coumadin for him ?

## 2019-11-06 ENCOUNTER — EXTERNAL HOME HEALTH (OUTPATIENT)
Dept: HOME HEALTH SERVICES | Facility: HOSPITAL | Age: 74
End: 2019-11-06

## 2019-11-11 RX ORDER — ALENDRONATE SODIUM AND CHOLECALCIFEROL 70; 2800 MG/1; [IU]/1
TABLET ORAL
Qty: 4 TABLET | Refills: 0 | Status: SHIPPED | OUTPATIENT
Start: 2019-11-11 | End: 2020-01-09

## 2019-11-12 ENCOUNTER — OFFICE VISIT (OUTPATIENT)
Dept: PAIN MEDICINE | Facility: CLINIC | Age: 74
End: 2019-11-12
Payer: MEDICARE

## 2019-11-12 VITALS
HEART RATE: 52 BPM | SYSTOLIC BLOOD PRESSURE: 148 MMHG | HEIGHT: 69 IN | BODY MASS INDEX: 43.25 KG/M2 | DIASTOLIC BLOOD PRESSURE: 81 MMHG | WEIGHT: 292 LBS

## 2019-11-12 DIAGNOSIS — M54.5 CHRONIC LOW BACK PAIN, UNSPECIFIED BACK PAIN LATERALITY, WITH SCIATICA PRESENCE UNSPECIFIED: ICD-10-CM

## 2019-11-12 DIAGNOSIS — M47.896 OTHER SPONDYLOSIS, LUMBAR REGION: Primary | ICD-10-CM

## 2019-11-12 DIAGNOSIS — M43.06 SPONDYLOLYSIS, LUMBAR REGION: Primary | ICD-10-CM

## 2019-11-12 DIAGNOSIS — M54.40 BACK PAIN OF LUMBOSACRAL REGION WITH SCIATICA: ICD-10-CM

## 2019-11-12 DIAGNOSIS — G89.29 CHRONIC LOW BACK PAIN, UNSPECIFIED BACK PAIN LATERALITY, WITH SCIATICA PRESENCE UNSPECIFIED: ICD-10-CM

## 2019-11-12 DIAGNOSIS — F41.9 ANXIETY: Primary | ICD-10-CM

## 2019-11-12 DIAGNOSIS — M51.36 DDD (DEGENERATIVE DISC DISEASE), LUMBAR: ICD-10-CM

## 2019-11-12 PROCEDURE — 99214 OFFICE O/P EST MOD 30 MIN: CPT | Mod: S$GLB,,, | Performed by: ANESTHESIOLOGY

## 2019-11-12 PROCEDURE — 99999 PR PBB SHADOW E&M-EST. PATIENT-LVL III: CPT | Mod: PBBFAC,,, | Performed by: ANESTHESIOLOGY

## 2019-11-12 PROCEDURE — 3077F SYST BP >= 140 MM HG: CPT | Mod: CPTII,S$GLB,, | Performed by: ANESTHESIOLOGY

## 2019-11-12 PROCEDURE — 1101F PT FALLS ASSESS-DOCD LE1/YR: CPT | Mod: CPTII,S$GLB,, | Performed by: ANESTHESIOLOGY

## 2019-11-12 PROCEDURE — 3079F PR MOST RECENT DIASTOLIC BLOOD PRESSURE 80-89 MM HG: ICD-10-PCS | Mod: CPTII,S$GLB,, | Performed by: ANESTHESIOLOGY

## 2019-11-12 PROCEDURE — 99999 PR PBB SHADOW E&M-EST. PATIENT-LVL III: ICD-10-PCS | Mod: PBBFAC,,, | Performed by: ANESTHESIOLOGY

## 2019-11-12 PROCEDURE — 3077F PR MOST RECENT SYSTOLIC BLOOD PRESSURE >= 140 MM HG: ICD-10-PCS | Mod: CPTII,S$GLB,, | Performed by: ANESTHESIOLOGY

## 2019-11-12 PROCEDURE — 1101F PR PT FALLS ASSESS DOC 0-1 FALLS W/OUT INJ PAST YR: ICD-10-PCS | Mod: CPTII,S$GLB,, | Performed by: ANESTHESIOLOGY

## 2019-11-12 PROCEDURE — 99214 PR OFFICE/OUTPT VISIT, EST, LEVL IV, 30-39 MIN: ICD-10-PCS | Mod: S$GLB,,, | Performed by: ANESTHESIOLOGY

## 2019-11-12 PROCEDURE — 3079F DIAST BP 80-89 MM HG: CPT | Mod: CPTII,S$GLB,, | Performed by: ANESTHESIOLOGY

## 2019-11-12 RX ORDER — PREGABALIN 75 MG/1
75 CAPSULE ORAL 2 TIMES DAILY
Qty: 60 CAPSULE | Refills: 6 | Status: SHIPPED | OUTPATIENT
Start: 2019-11-12 | End: 2020-01-09

## 2019-11-12 RX ORDER — ALPRAZOLAM 0.5 MG/1
0.5 TABLET ORAL NIGHTLY PRN
Qty: 30 TABLET | Refills: 0 | Status: CANCELLED | OUTPATIENT
Start: 2019-11-12 | End: 2019-12-12

## 2019-11-12 NOTE — LETTER
November 12, 2019      Eun Ignacio, NATHALIE  [address] [facility] [address] [place] [address]           [facility]  [address] [place] [address] [address]  [place] [id number]  Phone: [phone]  Fax: [phone]          Patient: Richard Bustos   MR Number: [id number]   YOB: 1945   Date of Visit: 11/12/2019       Dear Eun Ignacio:    Thank you for referring Richard Bustos to me for evaluation. Attached you will find relevant portions of my assessment and plan of care.    If you have questions, please do not hesitate to call me. I look forward to following Richard Bustos along with you.    Sincerely,    Earlene Hammer, CLIFF    Enclosure  CC:  No Recipients    If you would like to receive this communication electronically, please contact [email or web address] or [phone] to request more information on [employer] access.    For providers and/or their staff who would like to refer a patient to [facility], please contact us through our one-stop-shop provider referral line, [facility], at [phone].    If you feel you have received this communication in error or would no longer like to receive these types of communications, please e-mail [email or web address]

## 2019-11-12 NOTE — PROGRESS NOTES
Referring Physician: Eun Ignacio NP    PCP: Familia Ramirez Jr, MD    CC: back pain    HPI:   Richard Bustos is a 74 y.o. male with PMH significant for CAD s/p PCI (ASA and Plavix), atrial fibrllation on coumadin, hx of bilateral hip replacements, CKD, HTN, and DORA presents for the evaluation of low back pain. Patient was last seen in the [facility] in 8/2017 where he received a bilateral L3, L4, and L5 RFA with great benefit. The patient presents today reporting of a recurrence of his low back pain that was well treated with last RFA. Patient localizes his pain to the center of his lower back without radiation. Patient describes his pain as an aching, burning, and throbbing type of pain. Patient reports that his current pain is a 7/10. Patient reports that his low back pain makes it difficult for him to ambulate. Patient denies of any urinary/fecal incontinence, saddle anesthesia, or weakness. Patient reports of requiring admission to the hospital for heat exhaustion but denies of any other significant interval changes in his health since his RFA.     Aggravating factors: worse in the AM, standing, and walking    Mitigating factors: RFA    Relevant Surgeries: yes; hx of bilateral hip replacements     Interventional Therapies: yes   8/3/2017: RFA at L3, 4, 5 - reports 80% relief    : Reviewed and consistent with medication use as prescribed.  10/16/2019: Norco 7.5-325 mg; 90 tablets  9/14/2019: Norco 7.5-325 mg; 90 tablets  8/12/2019: Norco 7.5-325 mg; 90 tablets  7/15/2019: Norco 7.5-325 mg; 90 tablets    Non-pharmacologic Treatment:     · Physical Therapy: no    Pain Medications:         · Currently taking: Norco 7.5-325 mg PO BID PRN, OTC aleve and OTC Tylenol    · Has tried in the past:    · Opioids: yes  · NSAIDS: yes  · Tylenol: yes  · Anticonvulsants: yes; gabapentin - made patient dizzy and he could not tolerate  ·   Anticoagulation: yes; coumadin    ROS:  Review of Systems    Constitutional: Negative for chills and fever.   HENT: Negative for tinnitus.    Eyes: Negative for visual disturbance.   Respiratory: Negative for shortness of breath.    Cardiovascular: Negative for chest pain.   Gastrointestinal: Negative for nausea and vomiting.   Genitourinary: Negative for difficulty urinating.   Musculoskeletal: Positive for back pain.   Skin: Negative for rash.   Allergic/Immunologic: Negative for immunocompromised state.   Neurological: Negative for syncope.   Hematological: Does not bruise/bleed easily.   Psychiatric/Behavioral: Negative for suicidal ideas.        Past Medical History:   Diagnosis Date    Anemia     Anemia of other chronic disease 9/13/2017    Anemia, chronic renal failure 9/13/2017    Anemia, unspecified 9/13/2017    Anticoagulant long-term use     Aorta aneurysm     Arthritis     Atrial fibrillation     CAD (coronary artery disease)     CHF (congestive heart failure)     Chronic kidney disease     Clotting disorder 9/13/2017    Colon polyp     Diabetes mellitus     not on meds    Diabetes mellitus type II     Diverticulosis     Elevated PSA     Encounter for blood transfusion     Former smoker     GERD (gastroesophageal reflux disease)     Gout     Hx of colonic polyps     Hypercoagulable state     Hyperlipidemia     Hypertension     MI, old 2010    Myocardial infarction     9/2010    Osteomyelitis of ankle or foot 3/9/2017    Prostate cancer 06/2016    Sleep apnea     Squamous cell carcinoma 01/23/2018    Left helix, imiquimod    Venous stasis     Chronic     Past Surgical History:   Procedure Laterality Date    ABDOMINAL SURGERY      CARDIAC SURGERY      COLONOSCOPY  02/2011    diverticulosis with diverticula with clot, no active bleeding    COLONOSCOPY N/A 8/24/2016    Procedure: COLONOSCOPY;  Surgeon: Saroj Borjas MD;  Location: [facility];  Service: Endoscopy;  Laterality: N/A;    GASTRIC BYPASS  2/5/2008    IVC FILTER  "RETRIEVAL      JOINT REPLACEMENT  1996 and 2001    bi-lat hip replacement/Rt Hip and Lt Hip    ROTATOR CUFF REPAIR      Rt shoulder    Stents  8/18/2010    x 3    UPPER GASTROINTESTINAL ENDOSCOPY  02/2011     Family History   Problem Relation Age of Onset    Heart attack Mother     Heart attack Father     Heart attack Brother     Ulcerative colitis Daughter 35    Lupus Daughter     Colon cancer Neg Hx     Colon polyps Neg Hx     Crohn's disease Neg Hx     Melanoma Neg Hx     Psoriasis Neg Hx     Eczema Neg Hx      Social History     Socioeconomic History    Marital status:      Spouse name: Not on file    Number of children: Not on file    Years of education: Not on file    Highest education level: Not on file   Occupational History    Not on file   Social Needs    Financial resource strain: Not on file    Food insecurity:     Worry: Not on file     Inability: Not on file    Transportation needs:     Medical: Not on file     Non-medical: Not on file   Tobacco Use    Smoking status: Former Smoker    Smokeless tobacco: Never Used    Tobacco comment: 45 yrs ago, only smoked 3-4 packs in his entire life as a teenager   Substance and Sexual Activity    Alcohol use: Not Currently     Comment: rare    Drug use: No    Sexual activity: Not on file   Lifestyle    Physical activity:     Days per week: Not on file     Minutes per session: Not on file    Stress: Not on file   Relationships    Social connections:     Talks on phone: Not on file     Gets together: Not on file     Attends Congregational service: Not on file     Active member of club or organization: Not on file     Attends meetings of clubs or organizations: Not on file     Relationship status: Not on file   Other Topics Concern    Not on file   Social History Narrative    Not on file         Allergies: See med card    Vitals:    11/12/19 1124   BP: (!) 148/81   Pulse: (!) 52   Weight: 132.5 kg (292 lb)   Height: 5' 9" (1.753 m) "   PainSc:   7   PainLoc: Back         Physical exam:  Physical Exam   Constitutional: He is oriented to person, place, and time and well-developed, well-nourished, and in no distress.   HENT:   Head: Normocephalic and atraumatic.   Eyes: Conjunctivae and EOM are normal. Right eye exhibits no discharge. Left eye exhibits no discharge.   Cardiovascular: Normal rate.   Pulmonary/Chest: Effort normal and breath sounds normal. No respiratory distress.   Abdominal: Soft.   Neurological: He is alert and oriented to person, place, and time.   Skin: Skin is warm and dry. No rash noted. He is not diaphoretic.   Psychiatric: Mood, memory, affect and judgment normal.   Nursing note and vitals reviewed.       UPPER EXTREMITIES: Normal alignment, normal range of motion, no atrophy, no skin changes,  hair growth and nail growth normal and equal bilaterally. No swelling, no tenderness.    LOWER EXTREMITIES:  Normal alignment, normal range of motion, no atrophy, no skin changes,  hair growth and nail growth normal and equal bilaterally. No swelling, no tenderness.    LUMBAR SPINE  Lumbar spine: ROM is significantly limited with flexion extension and oblique extension with increased pain.    Supine straight leg raise: unable to perform secondary to fall risk   ((+)) Facet loading bilaterally  Internal and external rotation of the hips: unable to perform secondary to fall risk  Myofascial exam: No tenderness to palpation across lumbar paraspinous muscles.    ((--)) TTP at the SI joint  ANEL's test: unable to perform secondary to fall risk  One leg stand: unable to perform secondary to fall risk    Distraction test: unable to perform secondary to fall risk    CRANIAL NERVES:  II:  PERRL bilaterally,   III,IV,VI: EOMI.    V:  Facial sensation equal bilaterally  VII:  Facial motor function normal.  VIII:  Hearing equal to finger rub bilaterally  IX/X: Gag normal, palate symmetric  XI:  Shoulder shrug equal, head turn equal  XII:  Tongue  midline without fasciculations      MOTOR: Tone and bulk: normal bilateral upper and lower Strength: normal   Delt Bi Tri WE WF     R 5 5 5 5 5 5   L 5 5 5 5 5 5     IP ADD ABD Quad TA Gas HAM  R 5 5 5 5 5 5 5  L 5 5 5 5 5 5 5    SENSATION: Light touch and pinprick intact bilaterally  REFLEXES: normal, symmetric, nonbrisk.  Toes down, no clonus. Negative roca's sign bilaterally.  GAIT: normal rise, base, steps, and arm swing.        Imaging: no new imaging available for review    Assessment: Richard Bustos is a 74 y.o. male with PMH significant for CAD s/p PCI (ASA and Plavix), atrial fibrllation on coumadin, hx of bilateral hip replacements, CKD, HTN, and DORA presents for the evaluation of low back pain. Patient with recurrence of his low back pain that was previously well treated with RFA. Treatment plan outlined below.     Plan:  - Schedule for repeat bilateral L3, L4, and L5 medial branch RFA as it provided the patient with 80% relief and the patient reports that his current pain is a recurrence of his chronic low back pain  - External referral for low back pain  - Prescribed Lyrica 75 mg PO BID for chronic pain  - Okay to continue current pain regimen as prescribed by PCP  - RTC for the procedure as listed above     Thank you for referring this interesting patient, and I look forward to continuing to collaborate in his care.    Evangelist Bose MD  Pain Management

## 2019-11-13 PROBLEM — M54.40 BACK PAIN OF LUMBOSACRAL REGION WITH SCIATICA: Status: ACTIVE | Noted: 2019-11-13

## 2019-11-13 RX ORDER — ALPRAZOLAM 0.25 MG/1
0.25 TABLET ORAL NIGHTLY PRN
Qty: 30 TABLET | Refills: 0 | Status: SHIPPED | OUTPATIENT
Start: 2019-11-14 | End: 2019-12-11 | Stop reason: SDUPTHER

## 2019-11-13 RX ORDER — HYDROCODONE BITARTRATE AND ACETAMINOPHEN 7.5; 325 MG/1; MG/1
1 TABLET ORAL EVERY 6 HOURS PRN
Qty: 90 TABLET | Refills: 0 | Status: SHIPPED | OUTPATIENT
Start: 2019-11-14 | End: 2019-12-11 | Stop reason: SDUPTHER

## 2019-11-14 ENCOUNTER — TELEPHONE (OUTPATIENT)
Dept: PAIN MEDICINE | Facility: CLINIC | Age: 74
End: 2019-11-14

## 2019-11-14 DIAGNOSIS — M47.896 OTHER SPONDYLOSIS, LUMBAR REGION: Primary | ICD-10-CM

## 2019-11-14 NOTE — TELEPHONE ENCOUNTER
----- Message from Mayur Peck sent at 11/14/2019  8:58 AM CST -----  Contact: Heavenly with [facility]  Type: Needs Medical Advice    Who Called:  Heavenly    Emmanuel Call Back Number: [phone]  Additional Information: Pt would benefit from a transfer tub bench. If approved please send order and visit summary from 11/12/19 to [facility].

## 2019-11-19 ENCOUNTER — TELEPHONE (OUTPATIENT)
Dept: HOME HEALTH SERVICES | Facility: HOSPITAL | Age: 74
End: 2019-11-19

## 2019-11-21 ENCOUNTER — DOCUMENTATION ONLY (OUTPATIENT)
Dept: FAMILY MEDICINE | Facility: CLINIC | Age: 74
End: 2019-11-21

## 2019-11-21 NOTE — PROGRESS NOTES
Pre-Visit Chart Review  For Appointment Scheduled on (11/22/19)    There are no preventive care reminders to display for this patient.

## 2019-11-22 ENCOUNTER — OFFICE VISIT (OUTPATIENT)
Dept: FAMILY MEDICINE | Facility: CLINIC | Age: 74
End: 2019-11-22
Payer: MEDICARE

## 2019-11-22 VITALS
HEIGHT: 69 IN | HEART RATE: 60 BPM | SYSTOLIC BLOOD PRESSURE: 118 MMHG | BODY MASS INDEX: 43.76 KG/M2 | WEIGHT: 295.44 LBS | DIASTOLIC BLOOD PRESSURE: 68 MMHG | OXYGEN SATURATION: 95 % | TEMPERATURE: 98 F

## 2019-11-22 DIAGNOSIS — Z79.899 CHRONICALLY ON BENZODIAZEPINE THERAPY: ICD-10-CM

## 2019-11-22 DIAGNOSIS — E11.22 TYPE 2 DIABETES MELLITUS WITH STAGE 3 CHRONIC KIDNEY DISEASE, WITHOUT LONG-TERM CURRENT USE OF INSULIN: Chronic | ICD-10-CM

## 2019-11-22 DIAGNOSIS — I25.10 CORONARY ARTERY DISEASE, ANGINA PRESENCE UNSPECIFIED, UNSPECIFIED VESSEL OR LESION TYPE, UNSPECIFIED WHETHER NATIVE OR TRANSPLANTED HEART: ICD-10-CM

## 2019-11-22 DIAGNOSIS — F11.90 CHRONIC, CONTINUOUS USE OF OPIOIDS: ICD-10-CM

## 2019-11-22 DIAGNOSIS — D53.9 MACROCYTIC ANEMIA: ICD-10-CM

## 2019-11-22 DIAGNOSIS — M54.9 CHRONIC BILATERAL BACK PAIN, UNSPECIFIED BACK LOCATION: Primary | ICD-10-CM

## 2019-11-22 DIAGNOSIS — I15.2 HYPERTENSION ASSOCIATED WITH DIABETES: ICD-10-CM

## 2019-11-22 DIAGNOSIS — F41.9 ANXIETY: ICD-10-CM

## 2019-11-22 DIAGNOSIS — E11.59 HYPERTENSION ASSOCIATED WITH DIABETES: ICD-10-CM

## 2019-11-22 DIAGNOSIS — Z23 IMMUNIZATION DUE: ICD-10-CM

## 2019-11-22 DIAGNOSIS — N18.30 TYPE 2 DIABETES MELLITUS WITH STAGE 3 CHRONIC KIDNEY DISEASE, WITHOUT LONG-TERM CURRENT USE OF INSULIN: Chronic | ICD-10-CM

## 2019-11-22 DIAGNOSIS — G89.29 CHRONIC BILATERAL BACK PAIN, UNSPECIFIED BACK LOCATION: Primary | ICD-10-CM

## 2019-11-22 PROBLEM — N17.9 ACUTE RENAL FAILURE SUPERIMPOSED ON STAGE 3 CHRONIC KIDNEY DISEASE: Status: RESOLVED | Noted: 2019-10-19 | Resolved: 2019-11-22

## 2019-11-22 LAB
AMPHET+METHAMPHET UR QL: NEGATIVE
BARBITURATES UR QL SCN>200 NG/ML: NEGATIVE
BENZODIAZ UR QL SCN>200 NG/ML: NEGATIVE
BZE UR QL SCN: NEGATIVE
CANNABINOIDS UR QL SCN: NEGATIVE
CREAT UR-MCNC: 65 MG/DL (ref 23–375)
ETHANOL UR-MCNC: <10 MG/DL
METHADONE UR QL SCN>300 NG/ML: NEGATIVE
OPIATES UR QL SCN: NEGATIVE
PCP UR QL SCN>25 NG/ML: NEGATIVE
TOXICOLOGY INFORMATION: NORMAL

## 2019-11-22 PROCEDURE — 1101F PR PT FALLS ASSESS DOC 0-1 FALLS W/OUT INJ PAST YR: ICD-10-PCS | Mod: CPTII,S$GLB,, | Performed by: PHYSICIAN ASSISTANT

## 2019-11-22 PROCEDURE — 99999 PR PBB SHADOW E&M-EST. PATIENT-LVL V: CPT | Mod: PBBFAC,,, | Performed by: PHYSICIAN ASSISTANT

## 2019-11-22 PROCEDURE — 90662 IIV NO PRSV INCREASED AG IM: CPT | Mod: S$GLB,ICN,, | Performed by: PHYSICIAN ASSISTANT

## 2019-11-22 PROCEDURE — 1159F MED LIST DOCD IN RCRD: CPT | Mod: S$GLB,,, | Performed by: PHYSICIAN ASSISTANT

## 2019-11-22 PROCEDURE — 3074F PR MOST RECENT SYSTOLIC BLOOD PRESSURE < 130 MM HG: ICD-10-PCS | Mod: CPTII,S$GLB,, | Performed by: PHYSICIAN ASSISTANT

## 2019-11-22 PROCEDURE — 1101F PT FALLS ASSESS-DOCD LE1/YR: CPT | Mod: CPTII,S$GLB,, | Performed by: PHYSICIAN ASSISTANT

## 2019-11-22 PROCEDURE — G0008 FLU VACCINE - HIGH DOSE (65+) PRESERVATIVE FREE IM: ICD-10-PCS | Mod: S$GLB,ICN,, | Performed by: PHYSICIAN ASSISTANT

## 2019-11-22 PROCEDURE — 3078F DIAST BP <80 MM HG: CPT | Mod: CPTII,S$GLB,, | Performed by: PHYSICIAN ASSISTANT

## 2019-11-22 PROCEDURE — 3044F HG A1C LEVEL LT 7.0%: CPT | Mod: CPTII,S$GLB,, | Performed by: PHYSICIAN ASSISTANT

## 2019-11-22 PROCEDURE — 3074F SYST BP LT 130 MM HG: CPT | Mod: CPTII,S$GLB,, | Performed by: PHYSICIAN ASSISTANT

## 2019-11-22 PROCEDURE — 99999 PR PBB SHADOW E&M-EST. PATIENT-LVL V: ICD-10-PCS | Mod: PBBFAC,,, | Performed by: PHYSICIAN ASSISTANT

## 2019-11-22 PROCEDURE — G0008 ADMIN INFLUENZA VIRUS VAC: HCPCS | Mod: S$GLB,ICN,, | Performed by: PHYSICIAN ASSISTANT

## 2019-11-22 PROCEDURE — 1126F AMNT PAIN NOTED NONE PRSNT: CPT | Mod: S$GLB,,, | Performed by: PHYSICIAN ASSISTANT

## 2019-11-22 PROCEDURE — 90662 FLU VACCINE - HIGH DOSE (65+) PRESERVATIVE FREE IM: ICD-10-PCS | Mod: S$GLB,ICN,, | Performed by: PHYSICIAN ASSISTANT

## 2019-11-22 PROCEDURE — 3078F PR MOST RECENT DIASTOLIC BLOOD PRESSURE < 80 MM HG: ICD-10-PCS | Mod: CPTII,S$GLB,, | Performed by: PHYSICIAN ASSISTANT

## 2019-11-22 PROCEDURE — 1126F PR PAIN SEVERITY QUANTIFIED, NO PAIN PRESENT: ICD-10-PCS | Mod: S$GLB,,, | Performed by: PHYSICIAN ASSISTANT

## 2019-11-22 PROCEDURE — 99214 OFFICE O/P EST MOD 30 MIN: CPT | Mod: 25,S$GLB,, | Performed by: PHYSICIAN ASSISTANT

## 2019-11-22 PROCEDURE — 99214 PR OFFICE/OUTPT VISIT, EST, LEVL IV, 30-39 MIN: ICD-10-PCS | Mod: 25,S$GLB,, | Performed by: PHYSICIAN ASSISTANT

## 2019-11-22 PROCEDURE — 3044F PR MOST RECENT HEMOGLOBIN A1C LEVEL <7.0%: ICD-10-PCS | Mod: CPTII,S$GLB,, | Performed by: PHYSICIAN ASSISTANT

## 2019-11-22 PROCEDURE — 1159F PR MEDICATION LIST DOCUMENTED IN MEDICAL RECORD: ICD-10-PCS | Mod: S$GLB,,, | Performed by: PHYSICIAN ASSISTANT

## 2019-11-22 PROCEDURE — 80307 DRUG TEST PRSMV CHEM ANLYZR: CPT

## 2019-11-22 RX ORDER — NITROGLYCERIN 400 UG/1
SPRAY ORAL
Qty: 4.9 G | Refills: 3 | Status: SHIPPED | OUTPATIENT
Start: 2019-11-22 | End: 2020-09-20 | Stop reason: SDUPTHER

## 2019-11-22 RX ORDER — PAROXETINE 30 MG/1
30 TABLET, FILM COATED ORAL EVERY MORNING
Qty: 30 TABLET | Refills: 11 | Status: SHIPPED | OUTPATIENT
Start: 2019-11-22 | End: 2020-05-20

## 2019-11-22 RX ORDER — MEMANTINE HYDROCHLORIDE 5 MG/1
TABLET ORAL 2 TIMES DAILY
Refills: 6 | COMMUNITY
Start: 2019-11-08 | End: 2020-05-20

## 2019-11-22 NOTE — PROGRESS NOTES
Identified patient's name and . Administered high dose flu vaccine, IM. Patient tolerated well, aseptic technique maintained. Pain scale 0/10 with injection. Instructed patient to wait in the clinic for 15 minutes after the injection was given.

## 2019-11-22 NOTE — PROGRESS NOTES
Subjective:       Patient ID: Richard Bustos is a 74 y.o. male.    Chief Complaint: Follow-up (3 months)    Patient with controlled type 2 diabetes, chronic kidney disease stage 3, polyneuropathy, chronic back pain with chronic continuous opioid use, chronic anxiety with chronic continuous benzodiazepine use currently weaning presents for routine 3 month follow-up.  He is currently taking hydrocodone 3 times daily on a regular basis to control his pain. He was recently seen by pain management and  is scheduled to undergo radiofrequency ablation.  He was also prescribed Lyrica which she has never taken.  He has not yet started the medication.  Regarding his anxiety he was started on paroxetine 20 mg 3 months ago.  This was a medication change from Lexapro 20 mg.  He is currently being weaned from taking Xanax.  Patient states that he still has significant anxiety.  His daughter is present at the visit today and reports she feels his anxiety has significantly improved.  Of note patient was previously living in a home with his daughter and granddaughter.  His daughter is currently dealing with drug addiction.  This was causing significant anxiety for the patient.  He is now living with his older daughter and he does admit that his anxiety has improved since his living arrangements have changed.  Patients patient medical/surgical, social and family histories have been reviewed       Review of Systems   Constitutional: Negative for activity change, appetite change, fatigue and unexpected weight change.   Respiratory: Negative for cough, chest tightness and shortness of breath.    Cardiovascular: Negative for chest pain, palpitations and leg swelling.   Gastrointestinal: Negative for abdominal pain, anal bleeding, blood in stool, constipation, diarrhea and nausea.   Endocrine: Negative for polydipsia and polyuria.   Genitourinary: Negative for difficulty urinating, frequency, hematuria and urgency.   Musculoskeletal:  Positive for arthralgias, back pain and gait problem.   Skin: Negative for rash.   Neurological: Positive for tremors. Negative for dizziness and headaches.   Psychiatric/Behavioral: Negative for dysphoric mood. The patient is nervous/anxious.        Objective:      Physical Exam    Constitutional: He is cooperative. He does not appear ill. No distress.   Obese body habitus      Cardiovascular: Normal rate, regular rhythm and normal heart sounds.   Pulmonary/Chest: Effort normal and breath sounds normal.   Musculoskeletal:        Right hip: He exhibits decreased range of motion and decreased strength.        Left hip: He exhibits decreased range of motion and decreased strength.        Lumbar back: He exhibits decreased range of motion. He exhibits no tenderness.   Neurological: He is alert.   Psychiatric: He has a normal mood and affect. His speech is normal and behavior is normal. Judgment and thought content normal. Cognition and memory are normal.   Vitals reviewed.    Assessment:       1. Chronic bilateral back pain, unspecified back location    2. Chronic, continuous use of opioids    3. Chronically on benzodiazepine therapy    4. BMI 40.0-44.9, adult    5. Hypertension associated with diabetes    6. Type 2 diabetes mellitus with stage 3 chronic kidney disease, without long-term current use of insulin    7. Macrocytic anemia    8. Immunization due    9. Coronary artery disease, angina presence unspecified, unspecified vessel or lesion type, unspecified whether native or transplanted heart    10. Anxiety        Plan:       Richard was seen today for follow-up.    Diagnoses and all orders for this visit:    Chronic bilateral back pain, unspecified back location  -     TOXICOLOGY SCREEN, URINE, RANDOM (COMPLIANCE)    Chronic, continuous use of opioids  -     TOXICOLOGY SCREEN, URINE, RANDOM (COMPLIANCE)    Chronically on benzodiazepine therapy  -     TOXICOLOGY SCREEN, URINE, RANDOM (COMPLIANCE)  Pain contract is  signed and up-to-date.   reviewed.  Patient has received prescription for Lyrica from pain management Dr. Bose.  Will defer to PCP  BMI 40.0-44.9, adult  .  Obesity compounds patient co-morbidities and complicates treatment course.  Counseling given regarding diet modification     Hypertension associated with diabetes  -     Hemoglobin A1c; Future    Type 2 diabetes mellitus with stage 3 chronic kidney disease, without long-term current use of insulin  -     Hemoglobin A1c; Future  -     Comprehensive metabolic panel; Future    Macrocytic anemia  -     Vitamin B12; Future  -     Folate; Future  -     CBC auto differential; Future    Immunization due  -     Influenza - High Dose (65+) (PF) (IM)    Coronary artery disease, angina presence unspecified, unspecified vessel or lesion type, unspecified whether native or transplanted heart  -     nitroGLYCERIN 0.4 MG/DOSE TL SPRY (NITROLINGUAL) 400 mcg/spray spray; PLACE 1 SPRAY UNDER THE TONGUE EVERY 5 MINUTES AS NEEDED FOR CHEST PAIN    Anxiety  -   increased  paroxetine (PAXIL) 30 MG tablet; Take 1 tablet (30 mg total) by mouth every morning.  Continue to wean Xanax per PCP         Follow up for lab 1 week prior to pcp appt in 2/2020.   DISCLAIMER: This note was prepared with [employer] voice recognition transcription software. Garbled syntax, mangled pronouns, and other bizarre constructions may be attributed to that software system   Patient readiness: acceptance and barriers:social stressors    During the course of the visit the patient was educated and counseled about the following:     Diabetes:  Labs: hemoglobin A1C.  Hypertension:   Medication: no change.  Obesity:   Diet interventions: qualitative changes (increase low-fat,  high-fiber foods).    Goals: Diabetes: Maintain Hemoglobin A1C below 7, Hypertension: Reduce Blood Pressure and Obesity: Reduce calorie intake and BMI    Did patient meet goals/outcomes: No    The following self management tools provided:  declined    Patient Instructions (the written plan) was given to the patient/family.     Time spent with patient: 30 minutes    Barriers to medications present (no )    Adverse reactions to current medications (no)    Over the counter medications reviewed (Yes)

## 2019-11-24 NOTE — PROGRESS NOTES
[facility] Hematology/Oncology  PROGRESS NOTE      Subjective:       Patient ID:   NAME: Richard Bustos : 1945     74 y.o. male    Referring Doc: Ashley  Other Physicians: Bebeto Torrez Chamberlain    Chief Complaint:  Clot disorder f/u    History of Present Illness:     Patient returns today for a regularly scheduled follow-up visit.  The patient is doing ok overall with foot ulcer and infection on right foot slowly resolving. He has been under the care of [facility] and Dr Torrez with podiatry and the foot is doing a lot better per patient.   He is breathing ok. He denies any current fever, CP, SOB, HA's or N/V. He is here with his daughter.     [facility], etc. He is feeling better since discharged.             ROS:   GEN: normal without any fever, night sweats or weight loss  HEENT: normal with no HA's, sore throat, stiff neck, changes in vision  CV: normal with no CP, SOB, PND, THOMPSON or orthopnea  PULM: normal with no SOB, cough, hemoptysis, sputum or pleuritic pain  GI: normal with no abdominal pain, nausea, vomiting, constipation, diarrhea, melanotic stools, BRBPR, or hematemesis  : normal with no hematuria, dysuria  BREAST: normal with no mass, discharge, pain  SKIN: normal with no rash, erythema, bruising, or swelling    Allergies:  Review of patient's allergies indicates:   Allergen Reactions    Shellfish containing products Other (See Comments)     Other reaction(s): Gout       Medications:    Current Outpatient Medications:     allopurinol (ZYLOPRIM) 100 MG tablet, TAKE 1 TABLET(100 MG) BY MOUTH EVERY DAY, Disp: 90 tablet, Rfl: 3    ALPRAZolam (XANAX) 0.25 MG tablet, Take 1 tablet (0.25 mg total) by mouth nightly as needed for Anxiety., Disp: 30 tablet, Rfl: 0    atorvastatin (LIPITOR) 80 MG tablet, Take 1 tablet (80 mg total) by mouth once daily., Disp: 90 tablet, Rfl: 3    baclofen (LIORESAL) 10 MG tablet, Take 1 tablet (10 mg total) by mouth 2 (two) times  daily. For muscle spasms, Disp: 60 tablet, Rfl: 11    calcitRIOL (ROCALTROL) 0.5 MCG Cap, TK 1 C PO D, Disp: , Rfl: 4    carvedilol (COREG) 25 MG tablet, Take 1 tablet (25 mg total) by mouth 2 (two) times daily with meals., Disp: 180 tablet, Rfl: 2    clopidogrel (PLAVIX) 75 mg tablet, TAKE 1 TABLET BY MOUTH DAILY, Disp: 90 tablet, Rfl: 3    ferrous sulfate (FEOSOL) 325 mg (65 mg iron) Tab tablet, TAKE 1 TABLET BY MOUTH TWICE DAILY, Disp: 180 tablet, Rfl: 1    fluticasone (FLONASE) 50 mcg/actuation nasal spray, 2 sprays (100 mcg total) by Each Nare route once daily., Disp: 1 Bottle, Rfl: 11    FOLIC ACID/MULTIVIT-MIN/LUTEIN (CENTRUM SILVER ORAL), Take 1 tablet by mouth once daily., Disp: , Rfl:     FOSAMAX PLUS D 70 mg- 2,800 unit per tablet, TK 1 T PO Q 7 DAYS, Disp: , Rfl: 5    FOSAMAX PLUS D 70 mg- 2,800 unit per tablet, TAKE 1 TABLET BY MOUTH EVERY 7 DAYS, Disp: 4 tablet, Rfl: 0    furosemide (LASIX) 40 MG tablet, TAKE 1 TABLET BY MOUTH DAILY AS NEEDED, Disp: 90 tablet, Rfl: 3    HYDROcodone-acetaminophen (NORCO) 7.5-325 mg per tablet, Take 1 tablet by mouth every 6 (six) hours as needed for Pain., Disp: 90 tablet, Rfl: 0    L-METHYL-B6-B12 3-35-2 mg Tab, TAKE 1 TABLET BY MOUTH TWICE DAILY, Disp: 180 tablet, Rfl: 3    lisinopril (PRINIVIL,ZESTRIL) 40 MG tablet, Take 1 tablet (40 mg total) by mouth once daily., Disp: 90 tablet, Rfl: 3    magnesium oxide (MAG-OX) 400 mg (241.3 mg magnesium) tablet, TAKE 1 TABLET BY MOUTH EVERY DAY, Disp: 90 tablet, Rfl: 3    mecobal-levomefolat Ca-B6 phos (L-METHYL-B6-B12) 3-35-2 mg Tab, Take 1 tablet by mouth 2 (two) times daily., Disp: 180 tablet, Rfl: 3    memantine (NAMENDA) 5 MG Tab, , Disp: , Rfl: 6    mirabegron (MYRBETRIQ) 50 mg Tb24, Take 1 tablet (50 mg total) by mouth once daily., Disp: 30 tablet, Rfl: 11    nitroGLYCERIN 0.4 MG/DOSE TL SPRY (NITROLINGUAL) 400 mcg/spray spray, PLACE 1 SPRAY UNDER THE TONGUE EVERY 5 MINUTES AS NEEDED FOR CHEST PAIN,  Disp: 4.9 g, Rfl: 3    omeprazole (PRILOSEC) 20 MG capsule, TAKE 1 CAPSULE BY MOUTH DAILY, Disp: 90 capsule, Rfl: 3    paroxetine (PAXIL) 30 MG tablet, Take 1 tablet (30 mg total) by mouth every morning., Disp: 30 tablet, Rfl: 11    pregabalin (LYRICA) 75 MG capsule, Take 1 capsule (75 mg total) by mouth 2 (two) times daily., Disp: 60 capsule, Rfl: 6    ranitidine (ZANTAC) 150 MG tablet, TAKE 1 TABLET(150 MG) BY MOUTH TWICE DAILY, Disp: 180 tablet, Rfl: 3    vit C-vit E-lutein-min-om-3 (OCUVITE) 727-01-0-150 mg-unit-mg-mg Cap, Take 1 capsule by mouth once daily., Disp: , Rfl:     amLODIPine (NORVASC) 10 MG tablet, Take 1 tablet (10 mg total) by mouth once daily., Disp: 30 tablet, Rfl: 0    calcium carbonate (TUMS ULTRA) 400 mg calcium (1,000 mg) Chew, Take 1 tablet (400 mg total) by mouth once daily., Disp: 30 each, Rfl: 11    hydrocortisone 2.5 % cream, Apply topically 2 (two) times daily. for 10 days, Disp: 1 Tube, Rfl: 2    warfarin (COUMADIN) 4 MG tablet, Take 1 tablet (4 mg total) by mouth Daily., Disp: 30 tablet, Rfl: 0    PMHx/PSHx Updates:  See patient's last visit with me on 4/3/2019  See H&P on 4/9/2014        Pathology:  Cancer Staging  No matching staging information was found for the patient.          Objective:     Vitals:  Blood pressure 127/75, pulse 73, temperature 97.6 °F (36.4 °C), temperature source Oral, resp. rate 18, weight 133 kg (293 lb 4.8 oz).    Physical Examination:   GEN: no apparent distress, comfortable; AAOx3; overweight  HEAD: atraumatic and normocephalic  EYES: no pallor, no icterus, PERRLA  ENT: OMM, no pharyngeal erythema, external ears WNL; no nasal discharge; no thrush  NECK: no masses, thyroid normal, trachea midline, no LAD/LN's, supple  CV: RRR with no murmur; normal pulse; normal S1 and S2; no pedal edema  CHEST: Normal respiratory effort; CTAB; normal breath sounds; no wheeze or crackles  ABDOM: nontender and nondistended; soft; normal bowel sounds; no  rebound/guarding; overweight  MUSC/Skeletal: ROM normal; no crepitus; joints normal; no deformities or arthropathy; uses cane to walk  EXTREM: erythematous changes on bilateral lower extremities; right foot right; left foot is better  SKIN: no rashes, petechiae or subcutaneous nodules; prior skin cancer on face s/p cream (resolved);   : no mcdaniels  NEURO: grossly intact; motor/sensory WNL; AAOx3; no tremors  PSYCH: normal mood, affect and behavior  LYMPH: normal cervical, supraclavicular, axillary and groin LN's            Labs:     10/21/2019    Lab Results   Component Value Date    WBC 5.60 10/21/2019    HGB 8.8 (L) 10/21/2019    HCT 26.4 (L) 10/21/2019     (H) 10/21/2019     10/21/2019       11/13/2019  BMP  Lab Results   Component Value Date     11/13/2019     11/13/2019    K 5.1 11/13/2019    K 5.1 11/13/2019     11/13/2019     11/13/2019    CO2 26 11/13/2019    CO2 26 11/13/2019    BUN 32 (H) 11/13/2019    BUN 32 (H) 11/13/2019    CREATININE 1.6 (H) 11/13/2019    CREATININE 1.6 (H) 11/13/2019    CALCIUM 9.0 11/13/2019    CALCIUM 9.0 11/13/2019    ANIONGAP 8 11/13/2019    ANIONGAP 8 11/13/2019    ESTGFRAFRICA 49.0 (A) 11/13/2019    ESTGFRAFRICA 49.0 (A) 11/13/2019    EGFRNONAA 42.4 (A) 11/13/2019    EGFRNONAA 42.4 (A) 11/13/2019          Lab Results   Component Value Date    ALT 28 10/18/2019    AST 30 10/18/2019    ALKPHOS 102 10/18/2019    BILITOT 0.4 10/18/2019        Lab Results   Component Value Date    INR 2.6 11/21/2019    INR 2.9 11/14/2019    INR 1.5 (H) 11/05/2019 11/13/2019  Lab Results   Component Value Date    IRON 67 11/13/2019    TIBC 216 (L) 11/13/2019    FERRITIN 617 (H) 11/13/2019         Radiology/Diagnostic Studies:    No results found.    I have reviewed all available lab results and radiology reports.    Assessment/Plan:   (1) 74 y.o. male with diagnosis of chronic clot disorder with underlying MTHFR issues  - he was previously under the care of   Krunal Rodriguez with hematology at [facility]  - he has been on coumadin therapy per direction of Dr Tate with cardiology  - he has been having it adjusted by cardiology  - latest INR was 2.6 and adeqaute      (2) CAD - followed by Dr Tate    (3) Abdominal hematoma in past     (4) CRI - followed by Nephrology (Amy?)    (5) Chronic multifactorial anemia with underlying anemia of chronic disorders and chronic renal disease; chronic GIB  - latest hgb at  8.8.and a little lower  - he is on oral iron    (6) Right foot diabetic ulcer/ prior left toe issues - followed by Dr Torrez - improved per patient    (7) Urology following for prostate - Dr Moss        1. Prostate cancer     2. Anemia, chronic disease     3. Long term (current) use of anticoagulants     4. Hypercoagulable state, Prothrombin mutation     5. MTHFR mutation (methylenetetrahydrofolate reductase)     6. H/O bariatric surgery, 2008           PLAN:  1. Check labs every monthly  2. F/u with PCP, card, and neph  3. Continue coumadin per Dr Tate's direction  4. Refer to GI to rule out any GIB especially on coumadin  5. RTC in 3 months    Fax note to Bebeto Ramirez; Shen Torrez    Discussion:     I have explained all of the above in detail and the patient understands all of the current recommendation(s). I have answered all of their questions to the best of my ability and to their complete satisfaction.   The patient is to continue with the current management plan.            Electronically signed by Kai Ortiz MD

## 2019-11-25 ENCOUNTER — OFFICE VISIT (OUTPATIENT)
Dept: HEMATOLOGY/ONCOLOGY | Facility: CLINIC | Age: 74
End: 2019-11-25
Payer: MEDICARE

## 2019-11-25 VITALS
SYSTOLIC BLOOD PRESSURE: 127 MMHG | DIASTOLIC BLOOD PRESSURE: 75 MMHG | WEIGHT: 293.31 LBS | BODY MASS INDEX: 43.31 KG/M2 | HEART RATE: 73 BPM | TEMPERATURE: 98 F | RESPIRATION RATE: 18 BRPM

## 2019-11-25 DIAGNOSIS — Z98.84 H/O BARIATRIC SURGERY: ICD-10-CM

## 2019-11-25 DIAGNOSIS — C61 PROSTATE CANCER: Primary | ICD-10-CM

## 2019-11-25 DIAGNOSIS — D63.8 ANEMIA, CHRONIC DISEASE: ICD-10-CM

## 2019-11-25 DIAGNOSIS — Z15.89 MTHFR MUTATION (METHYLENETETRAHYDROFOLATE REDUCTASE): ICD-10-CM

## 2019-11-25 DIAGNOSIS — Z79.01 LONG TERM (CURRENT) USE OF ANTICOAGULANTS: ICD-10-CM

## 2019-11-25 DIAGNOSIS — D68.59 HYPERCOAGULABLE STATE: ICD-10-CM

## 2019-11-25 PROCEDURE — 1159F PR MEDICATION LIST DOCUMENTED IN MEDICAL RECORD: ICD-10-PCS | Mod: S$GLB,,, | Performed by: INTERNAL MEDICINE

## 2019-11-25 PROCEDURE — 3074F PR MOST RECENT SYSTOLIC BLOOD PRESSURE < 130 MM HG: ICD-10-PCS | Mod: S$GLB,,, | Performed by: INTERNAL MEDICINE

## 2019-11-25 PROCEDURE — 1125F PR PAIN SEVERITY QUANTIFIED, PAIN PRESENT: ICD-10-PCS | Mod: S$GLB,,, | Performed by: INTERNAL MEDICINE

## 2019-11-25 PROCEDURE — 99213 OFFICE O/P EST LOW 20 MIN: CPT | Mod: S$GLB,,, | Performed by: INTERNAL MEDICINE

## 2019-11-25 PROCEDURE — 3074F SYST BP LT 130 MM HG: CPT | Mod: S$GLB,,, | Performed by: INTERNAL MEDICINE

## 2019-11-25 PROCEDURE — 99213 PR OFFICE/OUTPT VISIT, EST, LEVL III, 20-29 MIN: ICD-10-PCS | Mod: S$GLB,,, | Performed by: INTERNAL MEDICINE

## 2019-11-25 PROCEDURE — 3078F PR MOST RECENT DIASTOLIC BLOOD PRESSURE < 80 MM HG: ICD-10-PCS | Mod: S$GLB,,, | Performed by: INTERNAL MEDICINE

## 2019-11-25 PROCEDURE — 3078F DIAST BP <80 MM HG: CPT | Mod: S$GLB,,, | Performed by: INTERNAL MEDICINE

## 2019-11-25 PROCEDURE — 1101F PT FALLS ASSESS-DOCD LE1/YR: CPT | Mod: S$GLB,,, | Performed by: INTERNAL MEDICINE

## 2019-11-25 PROCEDURE — 1125F AMNT PAIN NOTED PAIN PRSNT: CPT | Mod: S$GLB,,, | Performed by: INTERNAL MEDICINE

## 2019-11-25 PROCEDURE — 1159F MED LIST DOCD IN RCRD: CPT | Mod: S$GLB,,, | Performed by: INTERNAL MEDICINE

## 2019-11-25 PROCEDURE — 1101F PR PT FALLS ASSESS DOC 0-1 FALLS W/OUT INJ PAST YR: ICD-10-PCS | Mod: S$GLB,,, | Performed by: INTERNAL MEDICINE

## 2019-11-26 ENCOUNTER — PATIENT MESSAGE (OUTPATIENT)
Dept: PODIATRY | Facility: CLINIC | Age: 74
End: 2019-11-26

## 2019-12-03 ENCOUNTER — PATIENT MESSAGE (OUTPATIENT)
Dept: FAMILY MEDICINE | Facility: CLINIC | Age: 74
End: 2019-12-03

## 2019-12-03 ENCOUNTER — TELEPHONE (OUTPATIENT)
Dept: FAMILY MEDICINE | Facility: CLINIC | Age: 74
End: 2019-12-03

## 2019-12-03 NOTE — TELEPHONE ENCOUNTER
----- Message from Fito Cassidy sent at 12/3/2019  3:55 PM CST -----  Contact: Heavenly with [facility]  Type: Needs Medical Advice    Who Called:  Heavenly    Best Call Back Number: [phone]  Additional Information: pt discharge from  today. No longer home bound and has resumed driving

## 2019-12-03 NOTE — TELEPHONE ENCOUNTER
Per  nurse, Heavenly Amador  was ordered by the hospital and they cannot sign discharge order.  is asking if PCP can sign the patient's discharge paper. The patient was discharge from  today and no longer home bound. The patient has resumed driving.

## 2019-12-05 ENCOUNTER — OFFICE VISIT (OUTPATIENT)
Dept: UROLOGY | Facility: CLINIC | Age: 74
End: 2019-12-05
Payer: MEDICARE

## 2019-12-05 VITALS
WEIGHT: 290.88 LBS | HEIGHT: 70 IN | SYSTOLIC BLOOD PRESSURE: 145 MMHG | DIASTOLIC BLOOD PRESSURE: 74 MMHG | HEART RATE: 56 BPM | BODY MASS INDEX: 41.64 KG/M2

## 2019-12-05 DIAGNOSIS — C61 PROSTATE CANCER: Primary | ICD-10-CM

## 2019-12-05 DIAGNOSIS — N28.1 COMPLEX RENAL CYST: ICD-10-CM

## 2019-12-05 DIAGNOSIS — N32.81 OVERACTIVE BLADDER: ICD-10-CM

## 2019-12-05 PROCEDURE — 1159F PR MEDICATION LIST DOCUMENTED IN MEDICAL RECORD: ICD-10-PCS | Mod: S$GLB,,, | Performed by: UROLOGY

## 2019-12-05 PROCEDURE — 99499 UNLISTED E&M SERVICE: CPT | Mod: S$GLB,,, | Performed by: UROLOGY

## 2019-12-05 PROCEDURE — 99499 RISK ADDL DX/OHS AUDIT: ICD-10-PCS | Mod: S$GLB,,, | Performed by: UROLOGY

## 2019-12-05 PROCEDURE — 3078F DIAST BP <80 MM HG: CPT | Mod: CPTII,S$GLB,, | Performed by: UROLOGY

## 2019-12-05 PROCEDURE — 1159F MED LIST DOCD IN RCRD: CPT | Mod: S$GLB,,, | Performed by: UROLOGY

## 2019-12-05 PROCEDURE — 99999 PR PBB SHADOW E&M-EST. PATIENT-LVL III: CPT | Mod: PBBFAC,,, | Performed by: UROLOGY

## 2019-12-05 PROCEDURE — 81002 POCT URINE DIPSTICK WITHOUT MICROSCOPE: ICD-10-PCS | Mod: S$GLB,,, | Performed by: UROLOGY

## 2019-12-05 PROCEDURE — 99214 OFFICE O/P EST MOD 30 MIN: CPT | Mod: 25,S$GLB,, | Performed by: UROLOGY

## 2019-12-05 PROCEDURE — 3078F PR MOST RECENT DIASTOLIC BLOOD PRESSURE < 80 MM HG: ICD-10-PCS | Mod: CPTII,S$GLB,, | Performed by: UROLOGY

## 2019-12-05 PROCEDURE — 1101F PR PT FALLS ASSESS DOC 0-1 FALLS W/OUT INJ PAST YR: ICD-10-PCS | Mod: CPTII,S$GLB,, | Performed by: UROLOGY

## 2019-12-05 PROCEDURE — 3077F SYST BP >= 140 MM HG: CPT | Mod: CPTII,S$GLB,, | Performed by: UROLOGY

## 2019-12-05 PROCEDURE — 99214 PR OFFICE/OUTPT VISIT, EST, LEVL IV, 30-39 MIN: ICD-10-PCS | Mod: 25,S$GLB,, | Performed by: UROLOGY

## 2019-12-05 PROCEDURE — 3077F PR MOST RECENT SYSTOLIC BLOOD PRESSURE >= 140 MM HG: ICD-10-PCS | Mod: CPTII,S$GLB,, | Performed by: UROLOGY

## 2019-12-05 PROCEDURE — 99999 PR PBB SHADOW E&M-EST. PATIENT-LVL III: ICD-10-PCS | Mod: PBBFAC,,, | Performed by: UROLOGY

## 2019-12-05 PROCEDURE — 81002 URINALYSIS NONAUTO W/O SCOPE: CPT | Mod: S$GLB,,, | Performed by: UROLOGY

## 2019-12-05 PROCEDURE — 1101F PT FALLS ASSESS-DOCD LE1/YR: CPT | Mod: CPTII,S$GLB,, | Performed by: UROLOGY

## 2019-12-05 NOTE — PATIENT INSTRUCTIONS
Overactive bladder and urge incontinence  -continue myrbetriq 50mg once daily, refill given today for 1 year.     Prostate cancer, Gl 4+4 treated with radiation and ADT  -ADT x 2.5 years (last one was July 2018)  -psa every 6 months now. 0.1 but suspect calbiration issue and was the same as 6 months ago. n ext psa due in June 2020.   -in 2 years (2021) change to yearly FOREVER     hemhorragic renal cyst, RLP complex cyst  - rbus yearly. Us 12/2019 only showed 1 of the cyst but us 1 year ago showed 2. Pt has metal in hips and cardiac stents and kd dz so cannot get MRI or CT scan. At this point rbus has changed os many times and it's also essentially stayed the same size ok with repeating an ultrasound in a year.     F/u in 6months with psa prior  rbus for 1 year from now

## 2019-12-05 NOTE — PROGRESS NOTES
[facility] Progress Note - [place]  Urology Group: Isa/Brijesh/Becca/Galina  PCP: Dr.James newcomb [facility]    Chief Complaint: Follow-up (7mo/ / PSA)      Subjective:        HPI: Richard Bustos is a 74 y.o. male presents with       Prostate cancer, Gl 4+4 s/p XRT  completed in 2016+ 2.5 years ADT  Pt was referred to  for Gl 4+4, T2c, N0M0 prostate cancer in June 2016. Decided on radiation with concurrent ADT x 2 years . Underwent gold seed on 8/1/16. Pt completed 7560 Gy, 42 fractions of IRT (9/1/16-10/31/16). first Trelstar given 6/2016, last given 7/26/18.      Right renal hemhorragic cysts rbus 1/22/19 Two complex lesions at the lower pole right kidney.  The larger 4.9cm is unchanged.  The smaller has increased in size, now measuring 3.2 cm. Multiple additional simple renal cysts bilaterally.    -He states he tolerated the radiation but when I saw him in December 2017 he was having Frequency q2-3 hours, UUI that occured 3-4x a daily requiring 1 pad a day. Not on any med. Denies straining. Good stream. I started him on myrbetriq 50mg once a day in 2017 which improved his oab sx - no longer needed pads, UUI decreased to 2x a month (uses a cane). But he stopped and was Voiding q1-2 hours and +UUI 2x day, I restarted myrbetriq 50mg. Since then has improved frequency and urgency.    Interval history:   Taking myrbetriq 50mg once a day. Wears 1 pad at night and occ leakage 2x a week. Voids only a few x during the day.   psa <0.1 12/2/19 and 6 months ago. Prior to this was 0.01 but may be calibration issue.   rbus 12/2/19 only showed 5cm RLP complex cyst (previously had 2) likely  dependent. rbus prior to this had shown 4.   .       psa hx   12/2/19 <0.1  5/10/19 <0.1  4/24/19 <0.01  4/23/19 0.10  1/15/19 <0.01  10/16/18 <0.01  7/26/18 trelstar 22.5mg IM in right gluteus  7/5/18  <0.01, T 11  1/12/18 Bone density scan normal.   1/4/18  <0.01, T 14 -  "administered trelstar, started Fosamax  6/12/17 <0.01, T 13 - administered trelstar, started ca/vitD  12/12/16 <0.01 - adminstered trelstar  10/31/16 Completed radiation   8/1/16  3.3 - underwent goldseed  6/2016  trelstar  5/23/16 trus bx: Gl 4+4, volume 49.6g. Bone scan and CT scan negative  3/18/16 7.7  2/3/15   6.1  8/22/14  5.3  9/26/11  3.9  8/26/10  3.0    ua today: 100 protein  Urine history:  7/2018  No cx, void: 2+protein   4/12/18 Ng, void: 2+prot/tr blood- no dysuria "smell strong"      REVIEW OF SYSTEMS:  General ROS: no fevers, no chills  Psychological ROS: no depression  Endocrine ROS: no heat or cold  Respiratory ROS: no SOB  Cardiovascular ROS: no CP, + easy bruising  Gastrointestinal ROS: no abdominal pain, no constipation, no diarrhea, no BRBPR  Musculoskeletal ROS: no muscle pain  Neurological ROS: no headaches  Dermatological ROS: no rashes  HEENT: +glasses, no sinus   ROS: per HPI    Past Medical History:   Diagnosis Date    Anemia     Anemia of other chronic disease 9/13/2017    Anemia, chronic renal failure 9/13/2017    Anemia, unspecified 9/13/2017    Anticoagulant long-term use     Aorta aneurysm     Arthritis     Atrial fibrillation     CAD (coronary artery disease)     CHF (congestive heart failure)     Chronic kidney disease     Clotting disorder 9/13/2017    Colon polyp     Diabetes mellitus     not on meds    Diabetes mellitus type II     Diverticulosis     Elevated PSA     Encounter for blood transfusion     Former smoker     GERD (gastroesophageal reflux disease)     Gout     Hx of colonic polyps     Hypercoagulable state     Hyperlipidemia     Hypertension     MI, old 2010    Myocardial infarction     9/2010    Osteomyelitis of ankle or foot 3/9/2017    Prostate cancer 06/2016    Sleep apnea     Squamous cell carcinoma 01/23/2018    Left helix, imiquimod    Venous stasis     Chronic     Past Surgical History:   Procedure Laterality Date    " ABDOMINAL SURGERY      CARDIAC SURGERY      COLONOSCOPY  02/2011    diverticulosis with diverticula with clot, no active bleeding    COLONOSCOPY N/A 8/24/2016    Procedure: COLONOSCOPY;  Surgeon: Saroj Borjas MD;  Location: [facility];  Service: Endoscopy;  Laterality: N/A;    GASTRIC BYPASS  2/5/2008    IVC FILTER RETRIEVAL      JOINT REPLACEMENT  1996 and 2001    bi-lat hip replacement/Rt Hip and Lt Hip    ROTATOR CUFF REPAIR      Rt shoulder    Stents  8/18/2010    x 3    UPPER GASTROINTESTINAL ENDOSCOPY  02/2011     Social and family hx reviewed      There have not been any changes.    Cataracts? removed    Urologic meds: myrbetriq 50mg daily   Anticoagulation: Yes - coumadin for hypercoagulable state and plavix    Objective:     Vitals:    12/05/19 1500   BP: (!) 145/74   Pulse: (!) 56          Physical Exam   Constitutional: He is oriented to person, place, and time. He appears well-developed and well-nourished. He is cooperative. No distress.   HENT:   Head: Normocephalic and atraumatic.   Eyes: Pupils are equal, round, and reactive to light.   Cardiovascular: Normal rate and regular rhythm.    Pulmonary/Chest: Effort normal.   Abdominal: Soft. Normal appearance.   Musculoskeletal: Normal range of motion.   Neurological: He is alert and oriented to person, place, and time. He has normal reflexes.   Skin: Skin is intact.     Psychiatric: He has a normal mood and affect.           Labs:  Recent Labs   Lab 10/18/19  0720 10/20/19  0414 10/21/19  0442   WBC 9.70 7.10 5.60   Hemoglobin 10.5 L 9.6 L 8.8 L   Hematocrit 31.7 L 29.4 L 26.4 L   Platelets 168 168 165   ]  Recent Labs   Lab 10/22/18  0951  04/05/19  1059 07/02/19  1410 08/16/19  0845 09/30/19  0955 10/17/19  1055 10/18/19  0720  10/20/19  1559 10/21/19  0442 11/13/19  1020   Sodium 141   < > 141 143 141 140 140 140   < > 140 142 140  140   Potassium 4.8   < > 4.8 5.1 5.3 H 5.0 5.2 H 5.7 H   < > 4.9 3.5 5.1  5.1   Chloride 107   < > 111 112  H 111 108 114 H 114 H   < > 109 113 H 106  106   CO2 28   < > 23 25 24 27 22 L 21 L   < > 21 L 24 26  26   BUN, Bld 25 H   < > 34 H 39 H 43 H 35 H 47 H 50 H   < > 50 H 44 H 32 H  32 H   Creatinine 1.4   < > 1.4 1.8 H 2.0 H 1.6 H 1.9 H 2.0 H   < > 2.0 H 1.7 H 1.6 H  1.6 H   Glucose 139 H   < > 114 96 134 H 84 236 H 128 H   < > 155 H 111 79  79   Calcium 8.7   < > 9.1 9.4 9.3 9.0 9.0 8.5 L   < > 8.5 L 8.2 L 9.0  9.0   Magnesium 1.9  --   --  1.8  --  1.9  --   --   --   --   --   --    Phosphorus 3.3  3.3   < > 3.9  3.9 3.5  3.5  --  3.3  3.3  --   --   --   --   --   --    Alkaline Phosphatase  --    < >  --   --  82  --  95 102  --   --   --   --    Total Protein  --    < >  --   --  7.0  --  6.9 6.9  --   --   --   --    Albumin 3.0 L   < > 3.1 L 3.3 L 3.5 3.4 L 3.1 L 3.2 L  --   --   --   --    Total Bilirubin  --    < >  --   --  0.7  --  0.4 0.4  --   --   --   --    AST  --    < >  --   --  24  --  27 30  --   --   --   --    ALT  --    < >  --   --  20  --  29 28  --   --   --   --     < > = values in this interval not displayed.   ]    Lab Results   Component Value Date    LABA1C 6.6 (H) 08/08/2016    HGBA1C 5.6 08/16/2019       Path:     LEFT    RIGHT  BASE   3+3 (1/2),  4+3 (1/2)     MID    4+4 (1/2)    b9  APEX    b9      b9   Date of Biopsy: 5/23/16  PSA: 7.7  Volume: 49.6  Prostate nodule: no    Rads:   rbus 12/2/19  Right kidney: The right kidney measures 16.4 cm. Mild cortical thinning is noted.  No loss of corticomedullary distinction. Resistive index measures 0.81.  Persistent renal cysts are seen throughout the kidney.  The largest in the upper pole measures 5.8 x 5.6 cm.  There is a lower pole complex cyst with internal echoes measuring 4.7 x 5.0 cm.  These are not adversely changed.  No abnormal calcifications no hydronephrosis.    Left kidney: The left kidney measures 13.7 cm. Mild cortical thinning.  No loss of corticomedullary distinction. Resistive index measures 0.79.  Multiple  renal cysts are again identified.  The largest measures 6.2 x 4.3 x 6.6 cm in the midpole.  There is an upper pole 3.2 cm cyst in the lower pole 3.0 cm cyst.  These are simple in nature.  No solid mass.  No renal stone. No hydronephrosis.    The bladder is partially distended at the time of scanning and has an unremarkable appearance.    rbus 1/22/19  1. Sequela of chronic medical renal disease.  2. Two complex lesions at the lower pole right kidney.  The larger 4.9cm is unchanged.  The smaller has increased in size, now measuring 3.2 cm.  3. Multiple additional simple renal cysts bilaterally.  4. Cholelithiasis.    DEXA bone density done for ADT 1/12/18: Normal bone mineral density of the of the lumbar spine.    1/18/17 rbus  Multiple bilateral renal cysts  4 right renal hemhorragic cysts - 1.8 cm, 6.8 cm, 4.5 cm and 1.6     6/14/16   ctap   Bilateral renal masses  No significant adenopathy  8cm complex fluid collection    Bone scan 6/14/16  Negative    Assessment:       1. Prostate cancer    2. Complex renal cyst    3. Overactive bladder        Plan:     Overactive bladder and urge incontinence  -continue myrbetriq 50mg once daily, refill given today for 1 year.     Prostate cancer, Gl 4+4 treated with radiation and ADT  -ADT x 2.5 years (last one was July 2018)  -psa every 6 months now. 0.1 but suspect calbiration issue and was the same as 6 months ago. n ext psa due in June 2020.   -in 2 years (2021) change to yearly FOREVER     hemhorragic renal cyst, RLP complex cyst  - rbus yearly. Us 12/2019 only showed 1 of the cyst but us 1 year ago showed 2. Pt has metal in hips and cardiac stents and kd dz so cannot get MRI or CT scan. At this point rbus has changed os many times and it's also essentially stayed the same size ok with repeating an ultrasound in a year.     F/u in 6months with psa prior  rbus in a year

## 2019-12-06 ENCOUNTER — TELEPHONE (OUTPATIENT)
Dept: UROLOGY | Facility: CLINIC | Age: 74
End: 2019-12-06

## 2019-12-06 NOTE — TELEPHONE ENCOUNTER
----- Message from Adeline Moss MD sent at 12/5/2019  5:05 PM CST -----  please call chemistry lab for his psa that was just done to check machine and verify correct result off of machine due to known psa reporting issue, document correct result in result note, notify roberta in lab of pt mrn, and have lab make result correction in epic

## 2019-12-06 NOTE — TELEPHONE ENCOUNTER
Spoke with Mike with [facility] [place] checked results patient had psa done at [facility].   Spoke with [facility] lab and they state Sravanthi states nothing is wrong with the results there the right results.

## 2019-12-10 ENCOUNTER — PATIENT MESSAGE (OUTPATIENT)
Dept: PODIATRY | Facility: CLINIC | Age: 74
End: 2019-12-10

## 2019-12-10 NOTE — TELEPHONE ENCOUNTER
Spoke with Maricruz at [facility] she states there machine is fine they use different instruments and machines from [facility].

## 2019-12-10 NOTE — TELEPHONE ENCOUNTER
Spoke with supervisor Jeff he states there is no significant difference between the numbers. He states there machine is correct and they use a different calibration. If the doctors has questions his direct number is below.  [phone]  jeff

## 2019-12-11 ENCOUNTER — TELEPHONE (OUTPATIENT)
Dept: HOME HEALTH SERVICES | Facility: HOSPITAL | Age: 74
End: 2019-12-11

## 2019-12-11 DIAGNOSIS — F41.9 ANXIETY: ICD-10-CM

## 2019-12-11 DIAGNOSIS — M54.40 BACK PAIN OF LUMBOSACRAL REGION WITH SCIATICA: ICD-10-CM

## 2019-12-11 RX ORDER — ALPRAZOLAM 0.25 MG/1
0.25 TABLET ORAL NIGHTLY PRN
Qty: 30 TABLET | Refills: 0 | Status: SHIPPED | OUTPATIENT
Start: 2019-12-11 | End: 2020-02-20

## 2019-12-11 RX ORDER — HYDROCODONE BITARTRATE AND ACETAMINOPHEN 7.5; 325 MG/1; MG/1
1 TABLET ORAL EVERY 6 HOURS PRN
Qty: 90 TABLET | Refills: 0 | Status: SHIPPED | OUTPATIENT
Start: 2019-12-11 | End: 2020-01-13 | Stop reason: SDUPTHER

## 2020-01-02 ENCOUNTER — OFFICE VISIT (OUTPATIENT)
Dept: PODIATRY | Facility: CLINIC | Age: 75
End: 2020-01-02
Payer: MEDICARE

## 2020-01-02 VITALS
DIASTOLIC BLOOD PRESSURE: 68 MMHG | WEIGHT: 290.81 LBS | BODY MASS INDEX: 41.63 KG/M2 | SYSTOLIC BLOOD PRESSURE: 118 MMHG | HEIGHT: 70 IN | HEART RATE: 60 BPM

## 2020-01-02 DIAGNOSIS — L84 CORN OR CALLUS: ICD-10-CM

## 2020-01-02 DIAGNOSIS — L97.829: ICD-10-CM

## 2020-01-02 DIAGNOSIS — Z87.898 HISTORY OF ULCERATION: ICD-10-CM

## 2020-01-02 DIAGNOSIS — E11.49 TYPE II DIABETES MELLITUS WITH NEUROLOGICAL MANIFESTATIONS: Primary | ICD-10-CM

## 2020-01-02 DIAGNOSIS — E11.622: ICD-10-CM

## 2020-01-02 DIAGNOSIS — B35.1 ONYCHOMYCOSIS DUE TO DERMATOPHYTE: ICD-10-CM

## 2020-01-02 DIAGNOSIS — E11.51 TYPE II DIABETES MELLITUS WITH PERIPHERAL CIRCULATORY DISORDER: ICD-10-CM

## 2020-01-02 DIAGNOSIS — Z79.01 LONG TERM (CURRENT) USE OF ANTICOAGULANTS: ICD-10-CM

## 2020-01-02 DIAGNOSIS — R60.0 BILATERAL EDEMA OF LOWER EXTREMITY: ICD-10-CM

## 2020-01-02 PROCEDURE — 3044F HG A1C LEVEL LT 7.0%: CPT | Mod: CPTII,S$GLB,, | Performed by: PODIATRIST

## 2020-01-02 PROCEDURE — 1101F PR PT FALLS ASSESS DOC 0-1 FALLS W/OUT INJ PAST YR: ICD-10-PCS | Mod: CPTII,S$GLB,, | Performed by: PODIATRIST

## 2020-01-02 PROCEDURE — 99214 PR OFFICE/OUTPT VISIT, EST, LEVL IV, 30-39 MIN: ICD-10-PCS | Mod: 25,S$GLB,, | Performed by: PODIATRIST

## 2020-01-02 PROCEDURE — 99499 UNLISTED E&M SERVICE: CPT | Mod: S$GLB,,, | Performed by: PODIATRIST

## 2020-01-02 PROCEDURE — 99999 PR PBB SHADOW E&M-EST. PATIENT-LVL III: ICD-10-PCS | Mod: PBBFAC,,, | Performed by: PODIATRIST

## 2020-01-02 PROCEDURE — 1101F PT FALLS ASSESS-DOCD LE1/YR: CPT | Mod: CPTII,S$GLB,, | Performed by: PODIATRIST

## 2020-01-02 PROCEDURE — 3074F PR MOST RECENT SYSTOLIC BLOOD PRESSURE < 130 MM HG: ICD-10-PCS | Mod: CPTII,S$GLB,, | Performed by: PODIATRIST

## 2020-01-02 PROCEDURE — 99214 OFFICE O/P EST MOD 30 MIN: CPT | Mod: 25,S$GLB,, | Performed by: PODIATRIST

## 2020-01-02 PROCEDURE — 1159F MED LIST DOCD IN RCRD: CPT | Mod: S$GLB,,, | Performed by: PODIATRIST

## 2020-01-02 PROCEDURE — 3078F DIAST BP <80 MM HG: CPT | Mod: CPTII,S$GLB,, | Performed by: PODIATRIST

## 2020-01-02 PROCEDURE — 99999 PR PBB SHADOW E&M-EST. PATIENT-LVL III: CPT | Mod: PBBFAC,,, | Performed by: PODIATRIST

## 2020-01-02 PROCEDURE — 3074F SYST BP LT 130 MM HG: CPT | Mod: CPTII,S$GLB,, | Performed by: PODIATRIST

## 2020-01-02 PROCEDURE — 11056 PR TRIM BENIGN HYPERKERATOTIC SKIN LESION,2-4: ICD-10-PCS | Mod: Q9,S$GLB,, | Performed by: PODIATRIST

## 2020-01-02 PROCEDURE — 11056 PARNG/CUTG B9 HYPRKR LES 2-4: CPT | Mod: Q9,S$GLB,, | Performed by: PODIATRIST

## 2020-01-02 PROCEDURE — 11721 PR DEBRIDEMENT OF NAILS, 6 OR MORE: ICD-10-PCS | Mod: 59,Q9,S$GLB, | Performed by: PODIATRIST

## 2020-01-02 PROCEDURE — 99499 RISK ADDL DX/OHS AUDIT: ICD-10-PCS | Mod: S$GLB,,, | Performed by: PODIATRIST

## 2020-01-02 PROCEDURE — 3078F PR MOST RECENT DIASTOLIC BLOOD PRESSURE < 80 MM HG: ICD-10-PCS | Mod: CPTII,S$GLB,, | Performed by: PODIATRIST

## 2020-01-02 PROCEDURE — 11721 DEBRIDE NAIL 6 OR MORE: CPT | Mod: 59,Q9,S$GLB, | Performed by: PODIATRIST

## 2020-01-02 PROCEDURE — 1125F AMNT PAIN NOTED PAIN PRSNT: CPT | Mod: S$GLB,,, | Performed by: PODIATRIST

## 2020-01-02 PROCEDURE — 1159F PR MEDICATION LIST DOCUMENTED IN MEDICAL RECORD: ICD-10-PCS | Mod: S$GLB,,, | Performed by: PODIATRIST

## 2020-01-02 PROCEDURE — 3044F PR MOST RECENT HEMOGLOBIN A1C LEVEL <7.0%: ICD-10-PCS | Mod: CPTII,S$GLB,, | Performed by: PODIATRIST

## 2020-01-02 PROCEDURE — 1125F PR PAIN SEVERITY QUANTIFIED, PAIN PRESENT: ICD-10-PCS | Mod: S$GLB,,, | Performed by: PODIATRIST

## 2020-01-02 RX ORDER — ERGOCALCIFEROL 1.25 MG/1
CAPSULE ORAL
Status: ON HOLD | COMMUNITY
End: 2020-11-17

## 2020-01-02 RX ORDER — DOXYCYCLINE HYCLATE 100 MG
100 TABLET ORAL 2 TIMES DAILY
Qty: 14 TABLET | Refills: 0 | Status: SHIPPED | OUTPATIENT
Start: 2020-01-02 | End: 2020-01-09

## 2020-01-02 RX ORDER — LANOLIN ALCOHOL/MO/W.PET/CERES
CREAM (GRAM) TOPICAL
COMMUNITY
End: 2020-02-20 | Stop reason: SDUPTHER

## 2020-01-02 RX ORDER — DONEPEZIL HYDROCHLORIDE 5 MG/1
TABLET, FILM COATED ORAL
COMMUNITY
End: 2020-05-20

## 2020-01-02 NOTE — PROGRESS NOTES
Subjective:      Patient ID: Richard Bustos is a 74 y.o. male.    Chief Complaint: Nail Care ([facility] 11/22/2019)    Richard is a 74 y.o. male who presents to the clinic for evaluation and treatment of high risk feet. Richard has a past medical history of Anemia, Anemia of other chronic disease (9/13/2017), Anemia, chronic renal failure (9/13/2017), Anemia, unspecified (9/13/2017), Anticoagulant long-term use, Aorta aneurysm, Arthritis, Atrial fibrillation, CAD (coronary artery disease), CHF (congestive heart failure), Chronic kidney disease, Clotting disorder (9/13/2017), Colon polyp, Diabetes mellitus, Diabetes mellitus type II, Diverticulosis, Elevated PSA, Encounter for blood transfusion, Former smoker, GERD (gastroesophageal reflux disease), Gout, colonic polyps, Hypercoagulable state, Hyperlipidemia, Hypertension, MI, old (2010), Myocardial infarction, Osteomyelitis of ankle or foot (3/9/2017), Prostate cancer (06/2016), Sleep apnea, Squamous cell carcinoma (01/23/2018), and Venous stasis. The patient's chief complaint is pain and bump under right big toe area.  Gradual onset, worsening over past several weeks, aggravated by increased weight bearing, shoe gear, pressure.  No previous medical treatment.  No self treatment. Had a previous ulcer to that area once before. Needs new diabetic shoes    CC2 thick long discolored misshapen toenails all toes.  Gradual onset, worsening over past several weeks, aggravated by increased weight bearing, shoe gear, pressure.  Periodic debridement helps symptoms.      CC#3: Left anterior shin there is an open sore with drainage and redness per patient has been going on for several days. No prior treatments    PCP: Familia Ramirez Jr, MD    Date Last Seen by PCP:   Chief Complaint   Patient presents with    Nail Care     [facility]         Current shoe gear:  Affected Foot: sx shoe right     Unaffected Foot: Rx diabetic extra depth shoes and custom  accommodative insoles    History of Trauma: negative  Sign of Infection: none    Hemoglobin A1C   Date Value Ref Range Status   08/16/2019 5.6 4.0 - 5.6 % Final     Comment:     ADA Screening Guidelines:  5.7-6.4%  Consistent with prediabetes  >or=6.5%  Consistent with diabetes  High levels of fetal hemoglobin interfere with the HbA1C  assay. Heterozygous hemoglobin variants (HbS, HgC, etc)do  not significantly interfere with this assay.   However, presence of multiple variants may affect accuracy.     02/11/2019 6.2 (H) 4.0 - 5.6 % Final     Comment:     ADA Screening Guidelines:  5.7-6.4%  Consistent with prediabetes  >or=6.5%  Consistent with diabetes  High levels of fetal hemoglobin interfere with the HbA1C  assay. Heterozygous hemoglobin variants (HbS, HgC, etc)do  not significantly interfere with this assay.   However, presence of multiple variants may affect accuracy.     10/22/2018 5.9 (H) 4.0 - 5.6 % Final     Comment:     ADA Screening Guidelines:  5.7-6.4%  Consistent with prediabetes  >or=6.5%  Consistent with diabetes  High levels of fetal hemoglobin interfere with the HbA1C  assay. Heterozygous hemoglobin variants (HbS, HgC, etc)do  not significantly interfere with this assay.   However, presence of multiple variants may affect accuracy.     06/17/2013 6.8 (H) 4.8 - 5.6 % Final     Comment:              Increased risk for diabetes: 5.7 - 6.4           Diabetes: >6.4           Glycemic control for adults with diabetes: <7.0  **In order to standardize %HbA1c results worldwide, as of October 11, 2010,  the %HbA1c is being calculated using the master equation recommended in the  consensus statement adopted by the ADA (American Diabetes Assoc), EASD  (European Assoc for the Study of Diabetes), IFCC (International Federation  of Clinical Chemistry and Laboratory Medicine) and IDF (International  Diabetes Federation). Result units: %HgbA1c (DCCT/NGSP).  In common with other methods, Hb A1C values may not  accurately reflect mean  blood glucose in patients with hemoglobin variants (HgbF, HgbS and HgbC).  Any cause of shortened erythrocyte survival will reduce exposure of  erythrocytes to glucose with a consequent decrease in HbA1c (%) values, even  though the time-averaged blood glucose level may be elevated. Causes of  shortened erythrocyte lifetime might be hemolytic anemia or other hemolytic  diseases, homozygous sickle cell trait, pregnancy, recent significant or  chronic blood loss, etc. Caution should be used when interpreting the HbA1c  results from patients with these conditions.       Review of Systems   Constitution: Negative for chills, diaphoresis, fever, malaise/fatigue and night sweats.   Cardiovascular: Positive for leg swelling. Negative for claudication, cyanosis and syncope.   Skin: Positive for nail changes, poor wound healing and suspicious lesions. Negative for color change, dry skin, rash and unusual hair distribution.   Musculoskeletal: Negative for falls, joint pain, joint swelling, muscle cramps, muscle weakness and stiffness.   Gastrointestinal: Negative for constipation, diarrhea, nausea and vomiting.   Neurological: Positive for numbness, paresthesias and sensory change. Negative for brief paralysis, disturbances in coordination, focal weakness and tremors.           Objective:      Physical Exam   Constitutional: He appears well-developed and well-nourished. He is cooperative. No distress.   Cardiovascular:   Pulses:       Dorsalis pedis pulses are 1+ on the right side, and 1+ on the left side.        Posterior tibial pulses are 1+ on the right side, and 1+ on the left side.   Toes warm, pink, cap fill <5 seconds.    Bilateral LE edema 2+ with hyperpigmented changes to the skin throughout the LE   Lymphadenopathy:   Negative lymphadenopathy bilateral popliteal fossa and tarsal tunnel.     Neurological: A sensory deficit is present.   Diminished/loss of protective sensation all toes  bilateral to 10 gram monofilament.       Skin: Skin is warm and dry. No abrasion, no bruising, no burn, no ecchymosis, no laceration, no lesion and no rash noted. He is not diaphoretic. There is erythema. No pallor.     Toenails 1st, 2nd, 3rd, 4th, 5th  bilateral are hypertrophic thickened 2-3 mm, dystrophic, discolored tanish brown with tan, gray crumbly subungual debris.  Tender to distal nail plate pressure, without periungual skin abnormality of each.    Focal hyperkeratotic lesion consisting entirely of hyperkeratotic tissue without open skin, drainage, pus, fluctuance, malodor, or signs of infection plantar right 1st mtpj and lateral fifth MTPJ right    Otherwise, Skin thin, shiny, atrophic, with decreased density and distribution of pedal hair bilateral, but without hyperpigmentation, minerva discoloration,  ulcers, masses, nodules or cords palpated bilateral feet and legs.     Ulcer Location: Anterior left shin  Measurements: 0.7x0.4x0.1  Periwound: Intact  Drainage: serosanguinous.  Pus: None.  Malodor: None.  Base:  100% granular. 0% fibrin.  Signs of infection: Erythema and warmth to the touch.                   Assessment:       Encounter Diagnoses   Name Primary?    Type II diabetes mellitus with neurological manifestations Yes    Long term (current) use of anticoagulants     Type II diabetes mellitus with peripheral circulatory disorder     History of ulceration     Corn or callus     Bilateral edema of lower extremity     Diabetic ulcer of left shin          Plan:       Richard was seen today for nail care.    Diagnoses and all orders for this visit:    Type II diabetes mellitus with neurological manifestations  -     DIABETIC SHOES FOR HOME USE    Long term (current) use of anticoagulants  -     DIABETIC SHOES FOR HOME USE    Type II diabetes mellitus with peripheral circulatory disorder  -     DIABETIC SHOES FOR HOME USE    History of ulceration  -     DIABETIC SHOES FOR HOME USE    Rogers or  callus  -     DIABETIC SHOES FOR HOME USE    Bilateral edema of lower extremity  -     DIABETIC SHOES FOR HOME USE    Diabetic ulcer of left shin    Other orders  -     doxycycline (VIBRA-TABS) 100 MG tablet; Take 1 tablet (100 mg total) by mouth 2 (two) times daily. for 7 days      I counseled the patient on his conditions, their implications and medical management.        The patient has received literature on basic diabetic foot care.  Patient will inspect feet daily, wear protective shoe gear when ambulatory, and apply moisturizer to skin as needed to maintain elasticity and help prevent ulceration.    With the patient's permission, I debrided all ten toenails with a sterile nipper and curette, removing all offending nail and debris.  Patient tolerated the procedure well and related significant relief.      With the patient's permission, I debrided hyperkeratotic lesion(s) as above totaling    2 places as noted in above note right foot  to, not  Including dermis with sterile #15 blade.  Patient tolerated the procedure well and related significant relief.     Clindamycin for the redness and warmth to the left shin, cover wound at all times with bandage and C tubi  for compression to the LE    Return 1-2 weeks to ensure wound resolved, if not will consider unna boots.      [place] DPM

## 2020-01-03 ENCOUNTER — TELEPHONE (OUTPATIENT)
Dept: PAIN MEDICINE | Facility: CLINIC | Age: 75
End: 2020-01-03

## 2020-01-03 NOTE — TELEPHONE ENCOUNTER
Pt is scheduled to have a Radiofrequency ablation of lumbar- bilateral L3,L4,L5. Pt states he is currently on Plavix and coumadin. Plavix is advised to be held 7 days prior to procedure and Coumadin is advised to be held 5 days prior to procedure. Please advise if ok to Hold coumadin and plavix the recommended time listed above.  Thanks

## 2020-01-03 NOTE — TELEPHONE ENCOUNTER
----- Message from Kylee Quinn sent at 1/3/2020  8:18 AM CST -----  Contact: Citlali Frye (Daughter) [phone]  Citlali Frye (Daughter) [phone]  Please call concerning patient upcoming surgery on 1/10/2020.

## 2020-01-04 ENCOUNTER — PATIENT MESSAGE (OUTPATIENT)
Dept: CARDIOLOGY | Facility: CLINIC | Age: 75
End: 2020-01-04

## 2020-01-04 ENCOUNTER — PATIENT MESSAGE (OUTPATIENT)
Dept: SURGERY | Facility: HOSPITAL | Age: 75
End: 2020-01-04

## 2020-01-08 ENCOUNTER — PATIENT MESSAGE (OUTPATIENT)
Dept: PODIATRY | Facility: CLINIC | Age: 75
End: 2020-01-08

## 2020-01-08 NOTE — PRE-PROCEDURE INSTRUCTIONS
Pre-op phone call made to pt's's voicemail. All medications reviewed and  pre-procedure  instructions given.  Requested pt to call back to verify receipt of information.

## 2020-01-09 ENCOUNTER — PATIENT MESSAGE (OUTPATIENT)
Dept: PREADMISSION TESTING | Facility: HOSPITAL | Age: 75
End: 2020-01-09

## 2020-01-09 RX ORDER — ASPIRIN 81 MG/1
81 TABLET ORAL DAILY
Status: ON HOLD | COMMUNITY
End: 2023-04-28 | Stop reason: HOSPADM

## 2020-01-09 NOTE — PRE-PROCEDURE INSTRUCTIONS
PHONE PRE-OP WAS DONE.  PATIENT'S DAUGHTER VERBALIZED UNDERSTANDING OF INSTRUCTIONS AND EDUCATION.

## 2020-01-10 ENCOUNTER — HOSPITAL ENCOUNTER (OUTPATIENT)
Facility: HOSPITAL | Age: 75
Discharge: HOME | End: 2020-01-10
Attending: ANESTHESIOLOGY | Admitting: ANESTHESIOLOGY
Payer: MEDICARE

## 2020-01-10 VITALS
HEART RATE: 53 BPM | WEIGHT: 292 LBS | SYSTOLIC BLOOD PRESSURE: 129 MMHG | HEIGHT: 70 IN | TEMPERATURE: 98 F | BODY MASS INDEX: 41.8 KG/M2 | DIASTOLIC BLOOD PRESSURE: 64 MMHG | OXYGEN SATURATION: 97 % | RESPIRATION RATE: 18 BRPM

## 2020-01-10 DIAGNOSIS — M47.819 FACET ARTHROPATHY: Primary | ICD-10-CM

## 2020-01-10 DIAGNOSIS — M47.896 OTHER SPONDYLOSIS, LUMBAR REGION: ICD-10-CM

## 2020-01-10 LAB
INR PPP: 1 (ref 0.8–1.2)
PROTHROMBIN TIME: 10.7 SEC (ref 9–12.5)

## 2020-01-10 PROCEDURE — 99152 MOD SED SAME PHYS/QHP 5/>YRS: CPT | Mod: ,,, | Performed by: ANESTHESIOLOGY

## 2020-01-10 PROCEDURE — 71000015 HC POSTOP RECOV 1ST HR: Performed by: ANESTHESIOLOGY

## 2020-01-10 PROCEDURE — 64636 DESTROY L/S FACET JNT ADDL: CPT | Mod: 50,,, | Performed by: ANESTHESIOLOGY

## 2020-01-10 PROCEDURE — 36000705 HC OR TIME LEV I EA ADD 15 MIN: Performed by: ANESTHESIOLOGY

## 2020-01-10 PROCEDURE — 99900103 DSU ONLY-NO CHARGE-INITIAL HR (STAT): Performed by: ANESTHESIOLOGY

## 2020-01-10 PROCEDURE — 36415 COLL VENOUS BLD VENIPUNCTURE: CPT

## 2020-01-10 PROCEDURE — 64635 PR DESTROY LUMB/SAC FACET JNT: ICD-10-PCS | Mod: 50,,, | Performed by: ANESTHESIOLOGY

## 2020-01-10 PROCEDURE — 99152 PR MOD CONSCIOUS SEDATION, SAME PHYS, 5+ YRS, FIRST 15 MIN: ICD-10-PCS | Mod: ,,, | Performed by: ANESTHESIOLOGY

## 2020-01-10 PROCEDURE — 36000704 HC OR TIME LEV I 1ST 15 MIN: Performed by: ANESTHESIOLOGY

## 2020-01-10 PROCEDURE — 99900104 DSU ONLY-NO CHARGE-EA ADD'L HR (STAT): Performed by: ANESTHESIOLOGY

## 2020-01-10 PROCEDURE — 63600175 PHARM REV CODE 636 W HCPCS: Performed by: ANESTHESIOLOGY

## 2020-01-10 PROCEDURE — 85610 PROTHROMBIN TIME: CPT

## 2020-01-10 PROCEDURE — 64636 PR DESTROY L/S FACET JNT ADDL: ICD-10-PCS | Mod: 50,,, | Performed by: ANESTHESIOLOGY

## 2020-01-10 PROCEDURE — 64635 DESTROY LUMB/SAC FACET JNT: CPT | Mod: 50,,, | Performed by: ANESTHESIOLOGY

## 2020-01-10 RX ORDER — MIDAZOLAM HYDROCHLORIDE 1 MG/ML
INJECTION INTRAMUSCULAR; INTRAVENOUS
Status: DISCONTINUED | OUTPATIENT
Start: 2020-01-10 | End: 2020-01-10 | Stop reason: HOSPADM

## 2020-01-10 RX ORDER — FENTANYL CITRATE 50 UG/ML
INJECTION, SOLUTION INTRAMUSCULAR; INTRAVENOUS
Status: DISCONTINUED | OUTPATIENT
Start: 2020-01-10 | End: 2020-01-10 | Stop reason: HOSPADM

## 2020-01-10 RX ORDER — SODIUM CHLORIDE, SODIUM LACTATE, POTASSIUM CHLORIDE, CALCIUM CHLORIDE 600; 310; 30; 20 MG/100ML; MG/100ML; MG/100ML; MG/100ML
INJECTION, SOLUTION INTRAVENOUS ONCE AS NEEDED
Status: COMPLETED | OUTPATIENT
Start: 2020-01-10 | End: 2020-01-10

## 2020-01-10 RX ADMIN — SODIUM CHLORIDE, SODIUM LACTATE, POTASSIUM CHLORIDE, AND CALCIUM CHLORIDE 25 ML: .6; .31; .03; .02 INJECTION, SOLUTION INTRAVENOUS at 11:01

## 2020-01-10 NOTE — OP NOTE
PROCEDURE DATE: 1/10/2020    PROCEDURE:  Radiofrequency ablation of the L3, L4, L5 medial branch nerves on the bilateral-side utilizing fluoroscopy    DIAGNOSIS:  Other lumbar spondylosis  Post op Diagnosis: Same    PHYSICIAN: Evangelist Bose MD    MEDICATIONS INJECTED:  From a mixture of 6ml of 0.25% bupivicaine and 80mg of methylprednisone,  1ml of this solution was injected at each level.    LOCAL ANESTHETIC USED: Lidocaine 1%, 3 ml given at each site.    SEDATION MEDICATIONS: RN IV sedation    ESTIMATED BLOOD LOSS:  none    COMPLICATIONS:  none    TECHNIQUE:  A time out was taken to identify patient and procedure side prior to starting the procedure. Laying in a prone position, the patient was prepped and draped in the usual sterile fashion using ChloraPrep and sterile towels.  The levels were determined under fluoroscopic guidance and then marked.  Local anesthetic was given by raising a wheal at the skin over each site and then infiltrated approximately 2cm deeper.  A 20-gauge  100 mm RF needle was introduced to the anatomic location of the L3, L4, L5 medial branch nerves. Motor stimulation up to 2 Volts at each level confirmed no motor nerve involvement.  Impedance was less than 800 ohms at each level.  1ml of 2% lidocaine was instilled prior to lesioning.  Ablation was performed per level utilizing radiofrequency generator 80°C for 60 seconds.  Needles were then rotated 90° and a second lesion was performed.  The above noted medication was then injected slowly. The patient tolerated the procedure well.     The patient was monitored after the procedure.  Patient was given post procedure and discharge instructions to follow at home.  The patient was discharged in a stable condition

## 2020-01-10 NOTE — PLAN OF CARE
Meets criteria for discharge. Discharged to home with daughters. Denies nausea. Tolerating fluids. Mild pain. Steady transfers with assist. Discharge instructions reviewed and printed handout given. Verbalized understanding.

## 2020-01-10 NOTE — PROGRESS NOTES
Patient arrived to preop via wheelchair and wheeled to bathroom to void and partially dress to hospital gown.  wheelchaired to bed.  Lab drawn per phlebotomist at bedside. Tolerated well.

## 2020-01-10 NOTE — DISCHARGE SUMMARY
[facility]  Discharge Note  Short Stay    Admit Date: 1/10/2020    Discharge Date and Time: 1/10/2020    Attending Physician: Evangelist Bose MD     Discharge Provider: Evangelist Bose    Diagnoses:  Active Hospital Problems    Diagnosis  POA    *Other spondylosis, lumbar region [M47.896]  Yes      Resolved Hospital Problems   No resolved problems to display.       Hospital Course: Lumbar medial branch nerves radiofrequency ablation  Discharged Condition: Good    Final Diagnoses:   Active Hospital Problems    Diagnosis  POA    *Other spondylosis, lumbar region [M47.896]  Yes      Resolved Hospital Problems   No resolved problems to display.       Disposition: Home or Self Care    Follow up/Patient Instructions:    Medications:  Reconciled Home Medications:      Medication List      CHANGE how you take these medications    allopurinol 100 MG tablet  Commonly known as:  ZYLOPRIM  TAKE 1 TABLET(100 MG) BY MOUTH EVERY DAY  What changed:  See the new instructions.     lisinopril 40 MG tablet  Commonly known as:  PRINIVIL,ZESTRIL  Take 1 tablet (40 mg total) by mouth once daily.  What changed:  when to take this     paroxetine 30 MG tablet  Commonly known as:  PAXIL  Take 1 tablet (30 mg total) by mouth every morning.  What changed:  when to take this     warfarin 4 MG tablet  Commonly known as:  COUMADIN  Take 1 tablet (4 mg total) by mouth Daily.  What changed:  how much to take        CONTINUE taking these medications    ALPRAZolam 0.25 MG tablet  Commonly known as:  XANAX  Take 1 tablet (0.25 mg total) by mouth nightly as needed for Anxiety.     aspirin 81 MG EC tablet  Commonly known as:  ECOTRIN  Take 81 mg by mouth once daily.     atorvastatin 80 MG tablet  Commonly known as:  LIPITOR  Take 1 tablet (80 mg total) by mouth once daily.     baclofen 10 MG tablet  Commonly known as:  LIORESAL  Take 1 tablet (10 mg total) by mouth 2 (two) times daily. For muscle spasms     calcitRIOL 0.5 MCG Cap  Commonly  known as:  ROCALTROL  once daily.     calcium carbonate 400 mg calcium (1,000 mg) Chew  Commonly known as:  Tums Ultra  Take 1 tablet (400 mg total) by mouth once daily.     carvedilol 25 MG tablet  Commonly known as:  COREG  Take 1 tablet (25 mg total) by mouth 2 (two) times daily with meals.     CENTRUM SILVER ORAL  Take 1 tablet by mouth once daily.     clopidogrel 75 mg tablet  Commonly known as:  PLAVIX  TAKE 1 TABLET BY MOUTH DAILY     donepezil 5 MG tablet  Commonly known as:  ARICEPT  donepezil 5 mg tablet     ergocalciferol 50,000 unit Cap  Commonly known as:  ERGOCALCIFEROL  ergocalciferol (vitamin D2) 1,250 mcg (50,000 unit) capsule   TK 1 C PO Q WK     ferrous sulfate 325 mg (65 mg iron) Tab tablet  Commonly known as:  FEOSOL  TAKE 1 TABLET BY MOUTH TWICE DAILY     fluticasone propionate 50 mcg/actuation nasal spray  Commonly known as:  FLONASE  2 sprays (100 mcg total) by Each Nare route once daily.     furosemide 40 MG tablet  Commonly known as:  LASIX  TAKE 1 TABLET BY MOUTH DAILY AS NEEDED     HYDROcodone-acetaminophen 7.5-325 mg per tablet  Commonly known as:  NORCO  Take 1 tablet by mouth every 6 (six) hours as needed for Pain.     hydrocortisone 2.5 % cream  Apply topically 2 (two) times daily. for 10 days     * L-Methyl-B6-B12 3-35-2 mg Tab  Generic drug:  mecobal-levomefolat Ca-B6 phos  TAKE 1 TABLET BY MOUTH TWICE DAILY     * mecobal-levomefolat Ca-B6 phos 3-35-2 mg Tab  Commonly known as:  L-Methyl-B6-B12  Take 1 tablet by mouth 2 (two) times daily.     * magnesium oxide 400 mg (241.3 mg magnesium) tablet  Commonly known as:  MAG-OX  magnesium oxide 400 mg (241.3 mg magnesium) tablet   TK 1 T PO  QD     * magnesium oxide 400 mg (241.3 mg magnesium) tablet  Commonly known as:  MAG-OX  TAKE 1 TABLET BY MOUTH EVERY DAY     memantine 5 MG Tab  Commonly known as:  NAMENDA  2 (two) times daily.     mirabegron 50 mg Tb24  Commonly known as:  Myrbetriq  Take 1 tablet (50 mg total) by mouth once  daily.     nitroGLYCERIN 0.4 MG/DOSE TL SPRY 400 mcg/spray spray  Commonly known as:  NITROLINGUAL  PLACE 1 SPRAY UNDER THE TONGUE EVERY 5 MINUTES AS NEEDED FOR CHEST PAIN     Ocuvite [id number] mg-unit-mg-mg Cap  Generic drug:  vit C-vit E-lutein-min-om-3  Take 1 capsule by mouth once daily.     omeprazole 20 MG capsule  Commonly known as:  PRILOSEC  TAKE 1 CAPSULE BY MOUTH DAILY     ranitidine 150 MG tablet  Commonly known as:  ZANTAC  TAKE 1 TABLET(150 MG) BY MOUTH TWICE DAILY         * This list has 4 medication(s) that are the same as other medications prescribed for you. Read the directions carefully, and ask your doctor or other care provider to review them with you.            ASK your doctor about these medications    doxycycline 100 MG tablet  Commonly known as:  VIBRA-TABS  Take 1 tablet (100 mg total) by mouth 2 (two) times daily. for 7 days  Ask about: Should I take this medication?          Discharge Procedure Orders   Diet general     Call MD for:  temperature >100.4     Call MD for:  persistent nausea and vomiting     Call MD for:  severe uncontrolled pain     Call MD for:  difficulty breathing, headache or visual disturbances     Call MD for:  redness, tenderness, or signs of infection (pain, swelling, redness, odor or green/yellow discharge around incision site)     Call MD for:  hives     Call MD for:  persistent dizziness or light-headedness     Call MD for:  extreme fatigue        Follow up with MD in 2-3 weeks    Discharge Procedure Orders (must include Diet, Follow-up, Activity):   Discharge Procedure Orders (must include Diet, Follow-up, Activity)   Diet general     Call MD for:  temperature >100.4     Call MD for:  persistent nausea and vomiting     Call MD for:  severe uncontrolled pain     Call MD for:  difficulty breathing, headache or visual disturbances     Call MD for:  redness, tenderness, or signs of infection (pain, swelling, redness, odor or green/yellow discharge around incision  site)     Call MD for:  hives     Call MD for:  persistent dizziness or light-headedness     Call MD for:  extreme fatigue

## 2020-01-10 NOTE — PLAN OF CARE
Pt I pre op assessment complete. Pt somewhat forgettful. Lives with daCitlali bacon went over pts meds with nurse  Will contimue to monitor

## 2020-01-10 NOTE — DISCHARGE INSTRUCTIONS
No activity that takes mental clarity or physical endurance for 24 hours.  Apply ice to injection site(s) for 20 min an least 3 x daily.  You can resume taking any medications tomorrow that were held before the procedure.  You may have immediate relief from the numbing medication. When this wears off, your symptoms may return to baseline and occasionally are worse than prior to the procedure. Sometimes it may take up to 1-2 weeks before you experience any pain relief.   You can remove the dressing(s) tomorrow. No need to redress.  Monitor sites for redness, swelling, drainage with a foul odor, or temperature greater than 100.4.    Please seek medical help immediately if any of the following symptoms occur:  Severe increase in your usual pain or appearance of new pain.  Prolonged or increasing weakness or numbness in the legs or arms.  Headache that increases when your head is upright and decreases when you lie flat.  Shortness of breath, chest pain, or problem breathing.      General Information:    1.  Do not drink alcoholic beverages including beer for 24 hours or as long as you are on pain medication..  2.  Do not drive a motor vehicle, operate machinery or power tools, or signs legal papers for 24 hours or as long as you are on pain medication.   3.  You may experience light-headedness, dizziness, and sleepiness following surgery. Please do not stay alone. A responsible adult should be with you for this 24 hour period.  4.  Go home and rest.  5. Progress slowly to a normal diet unless instructed.  Otherwise, begin with liquids such as soft drinks, then soup and crackers working up to solid foods. Drink plenty of nonalcoholic fluids.  6.  Certain anesthetics and pain medications produce nausea and vomiting in certain  individuals. If nausea becomes a problem at home, call you doctor.  7. A nurse will be calling you sometime after surgery. Do not be alarmed. This is our way of finding out how you are doing.  8.  Several times every hour while you are awake, take 2-3 deep breaths and cough. If you had stomach surgery hold a pillow or rolled towel firmly against your stomach before you cough. This will help with any pain the cough might cause.  9. Several times every hour while you are awake, pump and flex your feet 5-6 times and do foot circles. This will help prevent blood clots.  10.Call your doctor for severe pain, bleeding, fever, or signs or symptoms of infection (pain, swelling, redness, foul odor, drainage).    Post op instructions for prevention of DVT  What is deep vein thrombosis?  Deep vein thrombosis (DVT) is the medical term for blood clots in the deep veins of the leg.  These blood clots can be dangerous.  A DVT can block a blood vessel and keep blood from getting where it needs to go.  Another problem is that the clot can travel to other parts of the body such as the lungs.  A clot that travels to the lungs is called a pulmonary embolus (PE) and can cause serious problems with breathing which can lead to death.  Am I at risk for DVT/PE?  If you are not very active, you are at risk of DVT.  Anyone confined to bed, sitting for long periods of time, recovering from surgery, etc. increases the risk of DVT.  Other risk factors are cancer diagnosis, certain medications, estrogen replacement in any form,older age, obesity, pregnancy, smoking, history of clotting disorders, and dehydration.  How will I know if I have a DVT?   Swelling in the lower leg   Pain   Warmth, redness, hardness or bulging of the vein  If you have any of these symptoms, call your doctors office right away.  Some people will not have any symptoms until the clot moves to the lungs.  What are the symptoms of a PE?   Panting, shortness of breath, or trouble breathing   Sharp, knife-like chest pain when you breathe   Coughing or coughing up blood   Rapid heartbeat  If you have any of these symptoms or get worse quickly, call 911 for  emergency treatment.  How can I prevent a DVT?   Avoid long periods of inactivity and dont cross your legs--get up and walk around every hour or so.   Stay active--walking after surgery is highly encouraged.  This means you should get out of the house and walk in the neighborhood.  Going up and down stairs will not impair healing (unless advised against such activity by your doctor).     Drink plenty of noncaffeinated, nonalcoholic fluids each day to prevent dehydration.   Wear special support stockings, if they have been advised by your doctor.   If you travel, stop at least once an hour and walk around.   Avoid smoking (assistance with stopping is available through your healthcare provider)  Always notify your doctor if you are not able to follow the post operative instructions that are given to you at the time of discharge.  It may be necessary to prescribe one of the medications available to prevent DVT.

## 2020-01-10 NOTE — H&P
"FOLLOW UP NOTE:     CHIEF COMPLAINT: back pain    INTERVAL HISTORY OF PRESENT ILLNESS: Richard Bustos is a 74 y.o. male with PMH significant for CAD s/p PCI (ASA and Plavix), atrial fibrllation on coumadin, hx of bilateral hip replacements, CKD, HTN, and DORA presents for treatment of low back pain. The patient is scheduled for repeat RFA at the L3, L4, and L5 medial branch nerves. The patient denies of any significant changes in his health since his last appointment. The patient also denies of any changes in the character of his pain since his last appointment. INR reviewed and it is normal.     ROS:  Review of Systems   Constitutional: Negative for chills and fever.   HENT: Negative for tinnitus.    Eyes: Negative for visual disturbance.   Respiratory: Negative for shortness of breath.    Cardiovascular: Negative for chest pain.   Gastrointestinal: Negative for nausea and vomiting.   Genitourinary: Negative for difficulty urinating.   Musculoskeletal: Positive for back pain.   Skin: Negative for rash.   Allergic/Immunologic: Negative for immunocompromised state.   Neurological: Negative for syncope.   Hematological: Does not bruise/bleed easily.   Psychiatric/Behavioral: Negative for suicidal ideas.        MEDICAL, SURGICAL, FAMILY, SOCIAL HX: reviewed    MEDICATIONS/ALLERGIES: reviewed    PHYSICAL EXAM:    VITALS: Vitals reviewed.   Vitals:    01/10/20 1116 01/10/20 1126   BP: 129/67 129/67   Pulse: (!) 51    Resp: 20    Temp: 98.2 °F (36.8 °C)    TempSrc: Skin    SpO2: 96%    Weight: 132.5 kg (292 lb)    Height: 5' 9.5" (1.765 m)        Physical Exam   Constitutional: He is oriented to person, place, and time and well-developed, well-nourished, and in no distress.   HENT:   Head: Normocephalic and atraumatic.   Eyes: Conjunctivae and EOM are normal. Right eye exhibits no discharge. Left eye exhibits no discharge.   Cardiovascular: Normal rate.   Pulmonary/Chest: Effort normal and breath sounds normal. No " respiratory distress.   Abdominal: Soft.   Neurological: He is alert and oriented to person, place, and time.   Skin: Skin is warm and dry. No rash noted. He is not diaphoretic.   Psychiatric: Mood, memory, affect and judgment normal.   Nursing note and vitals reviewed.       ASSESSMENT: Richard Bustos is a 74 y.o. male with PMH significant for CAD s/p PCI (ASA and Plavix), atrial fibrllation on coumadin, hx of bilateral hip replacements, CKD, HTN, and DORA presents for treatment of low back pain.     PLAN:  1. Proceed with bilateral L3, L4, and L5 medial branch nerve RFA as scheduled.     This patient has been cleared for surgery in an ambulatory surgical facility    ASA 3,  Mallampatti Score 3  No history of anesthetic complications  Plan for RN IV sedation    Evangelist Bose MD  Pain Medicine

## 2020-01-13 DIAGNOSIS — F41.9 ANXIETY: ICD-10-CM

## 2020-01-13 DIAGNOSIS — M54.40 BACK PAIN OF LUMBOSACRAL REGION WITH SCIATICA: ICD-10-CM

## 2020-01-13 RX ORDER — ALPRAZOLAM 0.25 MG/1
0.25 TABLET ORAL NIGHTLY PRN
Qty: 30 TABLET | Refills: 0 | Status: CANCELLED | OUTPATIENT
Start: 2020-01-13 | End: 2020-02-12

## 2020-01-13 RX ORDER — HYDROCODONE BITARTRATE AND ACETAMINOPHEN 7.5; 325 MG/1; MG/1
1 TABLET ORAL EVERY 6 HOURS PRN
Qty: 90 TABLET | Refills: 0 | Status: SHIPPED | OUTPATIENT
Start: 2020-01-13 | End: 2020-02-11 | Stop reason: SDUPTHER

## 2020-01-13 NOTE — TELEPHONE ENCOUNTER
Called pt, a lady answered the phone. I asked if Richard fallon was available, the lady said no and hung up the phone. Will attempt to try again later.

## 2020-01-13 NOTE — PROGRESS NOTES
Patient, Richard Bustos (MRN #[id number]), presented with a recorded BMI of 41.72 kg/m^2 consistent with the definition of morbid obesity (ICD-10 E66.01). The patient's morbid obesity was monitored, evaluated. This addendum to the medical record is made on 01/13/2020.

## 2020-01-13 NOTE — TELEPHONE ENCOUNTER
reviewed  Hydrocodone was refilled but alprazolam was not.  See previous notes to where we were tapering the alprazolam to discontinue.  From now on, he should be completely off of alprazolam and should not be on a combination of benzodiazepines and opioids.  Please let the patient know that the alprazolam will not be refilled as per the plan discussed with him.

## 2020-01-22 DIAGNOSIS — R09.89 CHRONIC SINUS COMPLAINTS: ICD-10-CM

## 2020-01-22 RX ORDER — FLUTICASONE PROPIONATE 50 MCG
SPRAY, SUSPENSION (ML) NASAL
Qty: 16 G | Refills: 5 | Status: SHIPPED | OUTPATIENT
Start: 2020-01-22 | End: 2020-01-22

## 2020-01-22 RX ORDER — FLUTICASONE PROPIONATE 50 MCG
2 SPRAY, SUSPENSION (ML) NASAL DAILY
Qty: 1 BOTTLE | Refills: 11 | Status: SHIPPED | OUTPATIENT
Start: 2020-01-22 | End: 2020-08-17

## 2020-01-22 RX ORDER — OMEPRAZOLE 20 MG/1
CAPSULE, DELAYED RELEASE ORAL
Qty: 90 CAPSULE | Refills: 3 | Status: SHIPPED | OUTPATIENT
Start: 2020-01-22 | End: 2020-12-19 | Stop reason: SDUPTHER

## 2020-01-23 ENCOUNTER — PATIENT MESSAGE (OUTPATIENT)
Dept: FAMILY MEDICINE | Facility: CLINIC | Age: 75
End: 2020-01-23

## 2020-01-23 ENCOUNTER — TELEPHONE (OUTPATIENT)
Dept: HEMATOLOGY/ONCOLOGY | Facility: CLINIC | Age: 75
End: 2020-01-23

## 2020-01-23 DIAGNOSIS — F41.1 GAD (GENERALIZED ANXIETY DISORDER): Primary | ICD-10-CM

## 2020-01-23 DIAGNOSIS — G31.84 MCI (MILD COGNITIVE IMPAIRMENT): ICD-10-CM

## 2020-01-24 ENCOUNTER — TELEPHONE (OUTPATIENT)
Dept: HEMATOLOGY/ONCOLOGY | Facility: CLINIC | Age: 75
End: 2020-01-24

## 2020-01-27 ENCOUNTER — OFFICE VISIT (OUTPATIENT)
Dept: CARDIOLOGY | Facility: CLINIC | Age: 75
End: 2020-01-27
Payer: MEDICARE

## 2020-01-27 VITALS
SYSTOLIC BLOOD PRESSURE: 158 MMHG | WEIGHT: 285.69 LBS | HEIGHT: 70 IN | BODY MASS INDEX: 40.9 KG/M2 | HEART RATE: 54 BPM | DIASTOLIC BLOOD PRESSURE: 74 MMHG | OXYGEN SATURATION: 94 %

## 2020-01-27 DIAGNOSIS — I71.20 THORACIC AORTIC ANEURYSM WITHOUT RUPTURE: Chronic | ICD-10-CM

## 2020-01-27 DIAGNOSIS — I48.0 PAROXYSMAL ATRIAL FIBRILLATION: ICD-10-CM

## 2020-01-27 DIAGNOSIS — Z95.5 STATUS POST INSERTION OF DRUG ELUTING CORONARY ARTERY STENT: ICD-10-CM

## 2020-01-27 DIAGNOSIS — R00.1 BRADYCARDIA, DRUG INDUCED: ICD-10-CM

## 2020-01-27 DIAGNOSIS — I51.9 LV DYSFUNCTION: Primary | ICD-10-CM

## 2020-01-27 DIAGNOSIS — M19.90 OTHER TYPE OF OSTEOARTHRITIS, UNSPECIFIED SITE: ICD-10-CM

## 2020-01-27 DIAGNOSIS — T50.905A BRADYCARDIA, DRUG INDUCED: ICD-10-CM

## 2020-01-27 DIAGNOSIS — R29.898 WEAKNESS OF BOTH LOWER EXTREMITIES: ICD-10-CM

## 2020-01-27 DIAGNOSIS — Z15.89 MTHFR MUTATION (METHYLENETETRAHYDROFOLATE REDUCTASE): ICD-10-CM

## 2020-01-27 DIAGNOSIS — I73.9 PERIPHERAL VASCULAR DISEASE: ICD-10-CM

## 2020-01-27 DIAGNOSIS — E66.01 MORBID OBESITY: ICD-10-CM

## 2020-01-27 DIAGNOSIS — R80.9 PROTEINURIA, UNSPECIFIED TYPE: ICD-10-CM

## 2020-01-27 DIAGNOSIS — E78.00 PURE HYPERCHOLESTEROLEMIA: ICD-10-CM

## 2020-01-27 DIAGNOSIS — I10 HYPERTENSION, UNSPECIFIED TYPE: ICD-10-CM

## 2020-01-27 DIAGNOSIS — R79.89 PRERENAL AZOTEMIA: ICD-10-CM

## 2020-01-27 DIAGNOSIS — Z79.01 LONG TERM (CURRENT) USE OF ANTICOAGULANTS: ICD-10-CM

## 2020-01-27 DIAGNOSIS — I11.9 HYPERTENSIVE LEFT VENTRICULAR HYPERTROPHY, WITHOUT HEART FAILURE: ICD-10-CM

## 2020-01-27 DIAGNOSIS — G47.33 OBSTRUCTIVE SLEEP APNEA SYNDROME: Chronic | ICD-10-CM

## 2020-01-27 PROCEDURE — 99499 RISK ADDL DX/OHS AUDIT: ICD-10-PCS | Mod: S$GLB,,, | Performed by: INTERNAL MEDICINE

## 2020-01-27 PROCEDURE — 1101F PT FALLS ASSESS-DOCD LE1/YR: CPT | Mod: CPTII,S$GLB,, | Performed by: INTERNAL MEDICINE

## 2020-01-27 PROCEDURE — 3077F SYST BP >= 140 MM HG: CPT | Mod: CPTII,S$GLB,, | Performed by: INTERNAL MEDICINE

## 2020-01-27 PROCEDURE — 3078F PR MOST RECENT DIASTOLIC BLOOD PRESSURE < 80 MM HG: ICD-10-PCS | Mod: CPTII,S$GLB,, | Performed by: INTERNAL MEDICINE

## 2020-01-27 PROCEDURE — 99499 UNLISTED E&M SERVICE: CPT | Mod: S$GLB,,, | Performed by: INTERNAL MEDICINE

## 2020-01-27 PROCEDURE — 1126F AMNT PAIN NOTED NONE PRSNT: CPT | Mod: S$GLB,,, | Performed by: INTERNAL MEDICINE

## 2020-01-27 PROCEDURE — 93000 EKG 12-LEAD: ICD-10-PCS | Mod: S$GLB,,, | Performed by: INTERNAL MEDICINE

## 2020-01-27 PROCEDURE — 3078F DIAST BP <80 MM HG: CPT | Mod: CPTII,S$GLB,, | Performed by: INTERNAL MEDICINE

## 2020-01-27 PROCEDURE — 1126F PR PAIN SEVERITY QUANTIFIED, NO PAIN PRESENT: ICD-10-PCS | Mod: S$GLB,,, | Performed by: INTERNAL MEDICINE

## 2020-01-27 PROCEDURE — 99999 PR PBB SHADOW E&M-EST. PATIENT-LVL III: CPT | Mod: PBBFAC,,, | Performed by: INTERNAL MEDICINE

## 2020-01-27 PROCEDURE — 99214 OFFICE O/P EST MOD 30 MIN: CPT | Mod: S$GLB,,, | Performed by: INTERNAL MEDICINE

## 2020-01-27 PROCEDURE — 1101F PR PT FALLS ASSESS DOC 0-1 FALLS W/OUT INJ PAST YR: ICD-10-PCS | Mod: CPTII,S$GLB,, | Performed by: INTERNAL MEDICINE

## 2020-01-27 PROCEDURE — 93000 ELECTROCARDIOGRAM COMPLETE: CPT | Mod: S$GLB,,, | Performed by: INTERNAL MEDICINE

## 2020-01-27 PROCEDURE — 1159F MED LIST DOCD IN RCRD: CPT | Mod: S$GLB,,, | Performed by: INTERNAL MEDICINE

## 2020-01-27 PROCEDURE — 1159F PR MEDICATION LIST DOCUMENTED IN MEDICAL RECORD: ICD-10-PCS | Mod: S$GLB,,, | Performed by: INTERNAL MEDICINE

## 2020-01-27 PROCEDURE — 99999 PR PBB SHADOW E&M-EST. PATIENT-LVL III: ICD-10-PCS | Mod: PBBFAC,,, | Performed by: INTERNAL MEDICINE

## 2020-01-27 PROCEDURE — 3077F PR MOST RECENT SYSTOLIC BLOOD PRESSURE >= 140 MM HG: ICD-10-PCS | Mod: CPTII,S$GLB,, | Performed by: INTERNAL MEDICINE

## 2020-01-27 PROCEDURE — 99214 PR OFFICE/OUTPT VISIT, EST, LEVL IV, 30-39 MIN: ICD-10-PCS | Mod: S$GLB,,, | Performed by: INTERNAL MEDICINE

## 2020-01-27 NOTE — PROGRESS NOTES
" Patient ID:  Richard Bustos is a 74 y.o. male who presents for {Misc; evaluation/follow-up:95143::"follow-up"} of 6 month f/u      Health literacy: {DESC; LOW/MEDIUM/HIGH:56156} medium  Activities: physical therapy 2 x week  Nicotine: former, quit at age 19   Alcohol: none  Illicit drugs: none  Cardiac symptoms: none  Home BP: occasional  Medication compliance: yes  Diet: regular, diabetic, Mediterranean regular  Caffeine: 2 cpd  Labs:   Last Echo: 08/2018  Last stress test: 01/2017  Cardiovascular angiogram:   ECG: 10/2019  Fundoscopic exam:     No flowsheet data found.      HPI    ROS     Objective:    Physical Exam      Assessment:       1. Hypertension, unspecified type         Plan:         Hypertension, unspecified type  -     EKG 12-lead                "

## 2020-01-27 NOTE — PROGRESS NOTES
Patient ID: Richard Bustos is a 69 y.o. male who presents for follow-up of Follow-up  For multiple medical morbidities, HFpEF, 6-months follow up  PCP: Dr. Ramirez, see every 4 months, does labs through [facility] including lipid  Renal: Dr. Reyes  Hematologist: Dr. Quezada  Urologist: Dr. Moss  Podiatrist: Dr. Nunes, [place]  Wound care: Dr. Torrez  Pain: Dr. 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, officially retired, still manages 6 rentals, lots of emotional stress    Health literacy: medium   Activities: more active, doing home PT twice weekly for an hour, no problem except some stiffness  Nicotine: quit at age 19 after 4 packs  Alcohol: none  Illicit drugs: none  Cardiac symptoms: none  Home BP: OK, daughter Citlali, MA check it  Medication compliance: yes  Diet: regular  Caffeine: 2 cpd  Labs: 8/2018, LDL 50.8, on 80 mg of atorvastatin, 7/2019 CMP (Cr 1.8, eGFR 37)  10/2019 LDL 51, Cr 1.7 eGFR 39.4, K+ 3.5, up to 5.7 in hospital,  improve from 419 on 10/6/2018, negative troponin, Hgb 8.8  1/2020 BUN 34, Scr 1.6, eGFR 42.4, albumin 3.2, iron sat 31%, have proteinuria, INR 2  Last Echo: 8/2018  Last stress test: 1/2017, lexiscan  Cardiovascular angiogram: 1/2015 with PCI  ECG: SB, 51, RBBB  Fundoscopic exam: within the past year, negative for HTN changes    In 2/2016:  White male with multiple medical problems (see List). Suffered a NSTEMI in 8/2010, catheterization showed SUMMARY:   1. Three vessel coronary artery disease.   2. Successful PCI. Involved 2 JAYCEE in the LAD and OM1.    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 Off diet at times, had underwent gastri bypass in 2008.    Last Echo in 2010 showed CONCLUSIONS  "  1 - Mild left ventricular enlargement.   2 - Normal left ventricular function (EF 58%).   3 - Diastolic dysfunction.   4 - Biatrial enlargement.   5 - Mildly to moderately enlarged ascending aorta.    Last carotid US: 9/13/2010  Bilateral 20%-40% narrowing.  No symptoms.    Coronary Artery Disease  Presents for follow-up visit. Pertinent negatives include no chest pain, dizziness, leg swelling, muscle weakness, palpitations, shortness of breath or weight gain. Risk factors include hyperlipidemia. The symptoms have been resolved. Compliance with diet is variable. Compliance with exercise is good. Compliance with medications is good.   Hyperlipidemia  Pertinent negatives include no chest pain, focal weakness, myalgias or shortness of breath.     EKG shows sinus bradycardia, rate 45, today NSR, 75, VPC, and nonspecific STTW abnormalities.     Since visit of 7/2012, have lots of stress with tenants, BP noted to be elevated, log reviewed 153-174/, HR 52-74. Had cardiac testing -   ECHO CONCLUSIONS 7/2012   1 - Mild left ventricular enlargement.   2 - Mildly to moderately depressed left ventricular function (EF 40%).   3 - Eccentric hypertrophy.   4 - Mildly to moderately enlarged ascending aorta.   5 - Mild left atrial enlargement.   6 - Normal diastolic function.   7 - Mild aortic regurgitation.   8 - Suggestion for inferoposterior hypokinesia.   9 - Compare to 8/17/2010, there is increase in LVH with decrease in   LVEF from 58%, and new inferoposterior hypokinesia. The Aortic dimensions   have not changed.    Carotid US, 7/2012  IMPRESSION   Findings consistent with less than 50% diameter stenosis of proximal   internal carotid arteries by NASCAET criteria. Flow in the vertebral   arteries appears antegrade bilaterally.    Since visit of 1/23, still with occasional angina, average once a week, lasting about a minute, "light" grade 3-4/10.  Workup:  ECHO, 2/5/20163, CONCLUSIONS   1 - Mild left ventricular " enlargement.   2 - Mildly to moderately depressed left ventricular function (EF 40%).   3 - Normal diastolic function.   4 - Inferoposterior hypokinesia consistent with ischemic disease..   5 - Moderately enlarged ascending aorta. 4.4 cm - stable   6 - No significant change from study of 7/17/2012.    Lexiscan, 2/5/2013  Nuclear Quantitative Functional Analysis:   LVEF: 34 % (normal is 47 - 59)   LVED Volume: 131 ml (normal is 91 - 155)   LVES Volume: 87 ml (normal is 40 - 78)   Impression: ABNORMAL MYOCARDIAL PERFUSION   1. There is evidence for mild to moderate myocardial ischemia in the   lateral apical wall of the left ventricle with associated stress induced   LV cavity dilatation with underlying injury present.   2. There is moderate intensity fixed defect in the inferolateral wall of   the left ventricle, consistent with myocardial injury. There is trivial   francine-injury ischemia.   3. There is abnormal wall motion at rest showing akinesis of the lateral   wall of the left ventricle, moderate global hypokinesis of the left   ventricle. dyskinesis of the inferolateral wall of the left ventricle.   4. There is resting LV dysfunction with a reduced ejection fraction of 34   %. (normal is 47 - 59)   5. The left ventricular volume is mildly increased at rest.   6. The extracardiac distribution of radioactivity is normal.  7. [facility] SSS =15, SRS =10, SDS =5, suggest 6.25% of myocardium may be   ischemic.    Since visit of 2/8, noticed some bilateral leg weakness, post bilateral THR, also occasional charley horse at night, mostly right sided. Today had mild chest pain, grade 2/10, while driving here. Claims to have not heard from [facility], INR on 2/18 was 1.8. Question about Plavix answered. Discussed with daughter, Citlali, over the telephone.     Since visit 2/22, no new problem. Discussed aortic aneurysm, will need at least annual imaging, do not recall the last time. Have claustrophobia requesting  Xanax. No problem with the medications, stays very active. Weight reviewed was below 290 lbs in 12/2011.   Apparently likes Arcadio rincon!     Since visit of 3/16, had MRA done with use of Xanax and hydrocodone,   Result - Max ascending aorta 4.3 cm stable.  Tolerating medications without problem, using BiPAP nightly, still lot of stress with [employer]ental [employer]. Discuss need for exercise program with weigh reduction of 10%. New problem include left leg weakness with localized pain behind the knee. For about 2 weeks, been dancing with new woman.    Since visit of 4/11/2013, hospitalized 2 weeks ago at [facility] for large hematoma due to use of Lovenox as bridge for oral surgery. Coumadin on hold, seems to be getting smaller and softer. Denies any heart symptoms or CP nor SOB. No problem with taking medications and using CPAP. Slowed down due to leg pain and fatigue. Feels good in the AM.    Since visit of 4/9/2014, complain of leg weakness, noted since 2/2013. Denies any CP nor SOB. Miss 2-3 doses of medications monthly. No other problem with medications. Awaken all night due to dog, sleep 10-4. Feels tired in the morning despite using CPAP. Have not had much blood work done. ECG shows AF rate 67, RBBB.  ECHO in 5/2014 CONCLUSIONS   1 - Enlarged aortic root. measuring 3.9 cm at sinotubular junction.  2 - Biatrial enlargement.   3 - Eccentric hypertrophy.   4 - Mildly to moderately depressed left ventricular systolic function (EF 40-45%).   5 - Normal left ventricular diastolic function.   6 - Right ventricular enlargement with normal systolic function.   7 - No significant change from echo on 2/5/2013..   8 - Difficult apical window, Optison contrast used for wall motion analysis..     Since visit of 11/21, no new problem. Some concern of HR down to 40 after exercise. Noted easy fatigue but remains quite sedentary. Discussed need for Coreg titration for LV dysfunction. Labs reviewed, CKD eith eGFR of  47, mild anemia Hgb 12.1, , A1C 4.7, and proteinuria.   Cardiac workup:  Carotid US, 12/2014 - CONCLUSIONS   There is 0 - 19% right Internal Carotid stenosis.  There is 0 - 19% left Internal Carotid stenosis.    Clean vessels    Lexiscan Nuclear Quantitative Functional Analysis:   LVEF: 38 % (normal is 47 - 59)  LVED Volume: 172 ml (normal is 91 - 155)  LVES Volume: 107 ml (normal is 40 - 78)    Impression: ABNORMAL MYOCARDIAL PERFUSION  1. There is evidence for mild to moderate myocardial ischemia in the inferior and lateral walls of the left ventricle with associated stress induced LV cavity dilatation with underlying injury present.   2. There is moderate to severe intensity fixed defect in the inferolateral wall of the left ventricle, consistent with myocardial injury.   3. There is abnormal wall motion at rest showing moderate global hypokinesis of the left ventricle. severe hypokinesis of the inferior and septal walls of the left ventricle.   4. There is resting LV dysfunction with a reduced ejection fraction of 38 %. (normal is 47 - 59)  5. The left ventricular volume is moderately increased at rest.   6. The extracardiac distribution of radioactivity is normal.   7. When compared to the previous study from 02/05/2013, no significant changes noted.  8. [facility] SSS=15, SRS=8, and SDS=7, suggest that 8.7% of myocardium may be ischemic.    Holter, 12/2014:  PREDOMINANT RHYTHM  1. Sinus arrhythmia with heart rates varying between 41 and 110 bpm with an average of 64 bpm.     VENTRICULAR ARRHYTHMIAS  1. There were frequent mostly monomorphic PVCs totalling 1297 and averaging 54 per hour. There was 1 couplet.    2. 1.6% of complexes.    3. There were no episodes of ventricular tachycardia.    SUPRA VENTRICULAR ARRHYTHMIAS  1. There were rare PACs totalling 108 and averaging 4 per hour.     2. There were no episodes of sustained supraventricular tachycardia.    SINUS NODE FUNCTION  1. There was no  evidence of high grade SA sang block.     AV CONDUCTION  1. There was no evidence of high grade AV block.     DIARY  1. The diary was not returned    MISCELLANEOUS  1. There were persistent hookup related artifacts. Overall, the study was of good quality.     2. This was a tape of adequate length (24 hrs).     Since visit of 12/23/2014, underwent [facility] with PCI of RCA. Off ASA due to rectal bleed. Discuss use of triple Rx for hopefully 3 months post JAYCEE implant. Feeling more energetic, no CP nor SOB. Discussed ascending Aortic aneurysm, last checked in 5/2014 with Echo - stable.   HEMODYNAMIC RESULTS:    LVEDP (Pre): 16 mmHg  LVEDP (Post): 18 mmHg  Ejection Fraction: 40%  Global LV Function: moderately depressed  BP: 109/70  HR: 96    Air rest:  LVEDP: 18    C. ANGIOGRAPHIC RESULTS:    DIAGNOSTIC:  Patient has a right dominant coronary artery.     - Left Main Coronary Artery:  The LM is normal. There is DAREK 3 flow.    - Left Anterior Descending Artery:  The mid LAD has a 50% stenosis. There is DAREK 3 flow. The remaining portion of the vessel has multiple lesions < 50% stenosed. Long stent noted in mid-LAD, 50% ISR.    - Left Circumflex Artery:  The LCX has luminal irregularities. There is DAREK 3 flow.    - Right Coronary Artery:  The mid RCA has a 75% stenosis. There is DAREK 3 flow. The remaining portion of the vessel has luminal irregularities. Appears to be close to distal end of previously placed stent.    - Aortic Root:  The Aortic Root is normal. There is DAREK 3 flow.      D. SUMMARY:    1. Two vessel coronary artery disease.  2. Diastolic dysfunction.  3. - Very difficult case due to tortuosity of the innominate artery, multiple manipulation needed for cannulation of CA.  4. - RCA lesion appears as a large plaque with significant luminal stenois.    E. RECOMMENDATIONS:    1. Continue medical management.  2. Cardiac rehab referral.  3. Weight loss.  4. Follow up with Dr. Familia Tate.  5. Schedule for PCI.  6.  "- Hydration overnight, eGFR 47.  7. - Dr. Gallegos consulted for PCI of RCA  8. - On chronic warfarin Rx, on hold for now  9. - Start  mg today and daily for total of 5 days as warfarin is resumed post PCI  10. - Start Plavix, load with 300 mg today then 75 mg daily in addition to warfarin.    PCI INTERVENTION:    Proximal RCA:  The lesion was successfully intervened. Post-stenosis of 0% and post-DAREK 3 flow. The vessel was accessed natively. The following items were used: Stent Resolute Rx 4.00x30 (JAYCEE).    C. SUMMARY:    1. Successful PCI/JAYCEE of the proximal RCA.    D. RECOMMENDATIONS:    1. Routine post PCI care.  2. Risk factor reductions.  3. ASA 81mg.  4. Clopidogrel (Plavix) for one year.  5. Weight loss.    Since visit of 1/22/2015, no CP nor SOB. Been compliant with medications, no problem. Noted venous ulcer in the left leg about a week ago. Dressing with peroxide. Lab from [facility] reviewed, K+ remains 5.5 with stable Cr of 1.78, eGFR 39. Active with rental properties upkeep.     Since visit of 3/27, no new problem, difficulties in getting lab results from [facility]. Reviewed eGFR 39, K+ 5.5, will need to stay with low K+ diet. Problem with venous stasis ulceration in the left leg. Worked with Wound Care for 2 weeks, told to return if problem.     Since visit of 4/23,no new problem, feeling "pretty good". In process of selling rental properties. Active with walking about 4 miles a day, takes about an hour. Legs better with ACE stocking. Review lab, BMP on 5/15/2015 K+ 4.7, stable renal function with eGFR 41, CBC in 1/2015 Hgb 11.8, last Ferritin level in 4/2011.  ECHO, 5/2015, CONCLUSIONS   1 - Moderately enlarged aortic root. measuring 4.1 cm at sinotubular junction.  2 - Concentric hypertrophy. Wall thickness is increased, with the septum and the posterior wall each measuring 1.4 cm across.  3 - Low normal to mildly depressed left ventricular systolic function (EF 50-55%).   4 - Normal left ventricular " diastolic function.   5 - Right ventricular enlargement with normal systolic function.   6 - Difficult windows, Optison contrast used for wall motion analysis.   7 - Suggestion for some improvement in LVEF from Echo on 5/7/2014.     Since visit of 5/29/2015, more active, compliant with medications, PT twice weekly for an hour, no problem. Last BMP in 5/2015, K+ 4.7, Cr 1.7, eGFR 41. Under care of Dr. Torrez for venous stasis and ulcer. Uses support hose occasionally due to the hot weather.    Since visit of 9/10/2015, no new problem, less stress, no regular exercise. Had completed phase II Cardiac Rehab. Asked to consider phase III. Home BP is good, 135. Citlali fixes medications, don't skip.    Since visit of 2/8/2016, no new problem. Undergoing urologic evaluation for kidney mass with biopsies of the bladder, prostate, and the right kidney, no problem with the procedure, awaiting results. ECG shows RBBB, no problem with medications, no regular exercise but considering to start. Decline stress test, no CP nor SOB.     In 12/2016, first concern is chronic fatigue, received 42 radiation Rx, have nocturia 3X nightly which interrupt the sleep, gets only about 5 hours. Will review with upcoming visit with Urologist. Stay active, able to walk 2 blocks with can, also uses free weights 2X weekly, for 15-20 minutes. Have DORA, uses CPAP 100%. For past 2 months had 2 episode of chest pain under emotional stress, last up to 15 minutes, max grade 3/10, have not use any NTG. Chart reviewed - last nuclear stress test in 12/2014. Had JAYCEE placed in 1/2015. Last lipid is excellent, LDL 57, non-HDL 75. Weight stable at around 294 lbs.   Echo, 6/2016 CONCLUSIONS     1 - Mild left ventricular enlargement.     2 - Eccentric hypertrophy.     3 - Moderately depressed left ventricular systolic function (EF 35-40%).     4 - Normal left ventricular diastolic function.     5 - Right ventricular enlargement with normal systolic function.     6 -  The estimated PA systolic pressure is greater than 25 mmHg.     7 - Mild tricuspid regurgitation.     8 - Trivial to mild aortic regurgitation.     9 - Suggestion for deterioration in LVEF from Echo in 5/2015.     10 - Difficult windows, Optison contrast used for wall motion analysis.     Carotid US  Consider repeat study in six months.    CONCLUSIONS   There is 40 - 49% right Internal Carotid stenosis.  There is 20 - 39% left Internal Carotid stenosis.    Antegrade flows in the vertebral arteries. Homogenous plaques noted in both ICAs.    In 2/2017, active with rental, feels tired all the time, using CPAP 100%, wake up feeling all right then tired after 2-3 hours. Was using hydrocodone bid for chronic pains, out of medications for 3 weeks, feels more tired off the narcotic. Explained need for regular exercise with muscle strengthening.  Lexiscan - Nuclear Quantitative Functional Analysis:   LVEF: 42 % (normal is 47 - 59)  LVED Volume: 124 ml (normal is 91 - 155)  LVES Volume: 72 ml (normal is 40 - 78)    Impression: ABNORMAL MYOCARDIAL PERFUSION  1. There is moderate intensity fixed defects in the lateral and inferolateral walls of the left ventricle, consistent with myocardial injury. There is trivial francine-injury ischemia.   2. There is abnormal wall motion at rest showing moderate hypokinesis of the inferolateral and inferior walls of the left ventricle.   3. There is resting LV dysfunction with a reduced ejection fraction of 42 %.  (normal is 47 - 59)  4. The ventricular volumes are normal at rest and stress.   5. The extracardiac distribution of radioactivity is normal.   6. When compared to the previous study from 12/15/2014, the inferolateral defects now appears fixed.  7. [facility] SSS=15, SRS=14, and SDS=1, suggest that only 1% of myocardium may be ischemic.    Carotid US - CONCLUSIONS   There is 20 - 39% right Internal Carotid stenosis.  There is 0 - 19% left Internal Carotid stenosis.    Homogenous  plaques noted in the ICAs, antegrade flows in the vertebral  arteries.    In 7/2017, here supposedly for HTN but not certain, also planning to do RF nerve ablation next Thursday with Dr. Causey, no surgical clearance requested. BP in PCP office was 112/60. Compliant with medications. Also have started using BiPAP every night for DORA, feel better. Denies any CP nor SOB. Active with property management and HA. ECG shows NSR, RBBB.     In 1/2018, no cardiac symptom, no heart worries. Very few home BP. Remain active using the cane, limited by loss of balance due to neuropathy of the LE. Fallen twice, due to the shoe, uses cane as needed. One episode, hit nose with bleeding. Labs reviewed from 9/2017: LDL 51.4, A1C 6.7%, anemia Hgb 9.8, CKD stage 3, eGFR 43   Echo in 8/2017 - CONCLUSIONS     1 - Eccentric hypertrophy.     2 - Moderately depressed left ventricular systolic function (EF 35-40%).     3 - Regional wall motion analysis consistent with ischemic disease.     4 - Impaired LV relaxation, normal LAP (grade 1 diastolic dysfunction).     5 - Trivial to mild mitral regurgitation.     6 - Right ventricular enlargement with normal systolic function.     7 - The estimated PA systolic pressure is 26 mmHg.     8 - No definite change from Echo in 6/2016.     In 7/2018, occasional electricity in the heart, very brief. ECG in NSR, 62 bpm, PVC and RBBB. No CP, stay active dealing with a lot friends' problem, he is the problem solver, very stressful.  Carotid US 2/2017  CONCLUSIONS   There is 0 - 19% right Internal Carotid stenosis.  There is 0 - 19% left Internal Carotid stenosis.    Homogenous plaques in the ICAs with antegrade flows in the vertebral arteries.    Holter - Conclusion   · NSR with occasional mostly monomorphic PVC, 3000, about 2% of complexes, rare PAC  · No symptom reported        In 1/2019, return for 6 months review. No heart worries, no cardiac symptoms. Trying to be active, walking a problem with leg weakness  "and dizziness. Considered high fall risk. Fairly sedentary. LDL 8/2018, 50. Lots of agitation from the renters.   Echo 8/2018 - The left ventricle cavity is normal.  · Left atrium is moderately dilated.  · Tricuspid valve shows mild regurgitation.  · Left ventricle ejection fraction is low normal at 51%  · Normal LV diastolic function.  · RV systolic function is normal.  · Improved LVEF from Echo in 8/2017     Difficult windows, Lumason contrast used for wall motion analysis.    In 7/2019, here for 6 months review, remain pretty limited but no symptom. No heart worries.    In 10/2019, here for post DC review,   DCS "73 y/o male who has a PMH of CKD 3 , HTN,P  A. FIB , CHF with EF of 51%,, CAD, Gout , DORA, and Prostate cancer in the past who  presented with the above concerns. He has recently started on new medication Namenda. He had labs , UA, CT Head , and Chest X-ray. He was noted to have EMMY on CKD , Hyperkalemia , and concerns of foul smelling urine at home. He was admitted to the hospital for concerns of weakness, dizziness , and increase confusion for the last 2 days. He had  Hyperkalemia 5.7, and metabolic encephalopathy He failed outpatient treatment as he was seen in the ER day before admission, with negative work up.  still has hyperkalemia,started again on Kayexalate.improved,ARF on CKD 3 improved,did well with PT,OT and ar DC time HH is arranged,mental status was back to his baseline.  Patient has been discharged home with no ACE,or ARB duo to hyperkalemia and worsening CKD and follow up with PCP in next few days."    Daughter report elevated BP post DC, >150/76. Feeling fine.     HPI Comments: in 1/2020, here for 6-months review, more active with PT twice a week, limited by some leg weakness. No heart worries and denies any cardiac symptoms.    Review of Systems   Constitution: Negative for diaphoresis, fever, some weakness, and malaise/fatigue, no night sweats and have weight loss of 10 lbs since " 10/2019.  HENT: Negative for headaches, nosebleeds and tinnitus.   Eyes: Negative for visual disturbance. Have watery eyes, photophobia  Cardiovascular: Positive for dyspnea on exertion and chest pain with emotional stress. Negative claudication, cyanosis, irregular heartbeat, leg swelling, near-syncope, orthopnea, palpitations and paroxysmal nocturnal dyspnia.   Respiratory: Negative for cough, shortness of breath, sleep disturbances due to breathing, snoring and wheezing. Trinidad score 13 down to a 10 on CPAP 100% use  Endocrine: Negative for polydipsia and polyuria.   Hematologic/Lymphatic: Does not bruise/bleed easily.   Skin: Negative for nail changes, color change, flushing, poor wound healing and suspicious lesions. ecchymosis on ASA especially with the cat, and warfarin  Musculoskeletal: Positive for arthritis, back pain and muscle cramps on daily Xanax 1 mg. Negative for falls, gout, joint pain, joint swelling,   Bilateral hip replacements (right 1996, left 2001), right knee arthroscopic procedure 1996.  Have muscle weakness and myalgias in hip and legs, also muscle cramps in the hands, and legs at night.  Gastrointestinal: Negative for heartburn, hematemesis, hematochezia, melena and nausea. Have no bloating, have constipation and excessive gas.  Neurological: Negative for disturbances in coordination, have excessive daytime sleepiness, dizziness, focal weakness in both LE, occasional light-headedness, have numbness, occasional loss of balance, no vertigo.   Psychiatric/Behavioral: Negative for depression and substance abuse. The patient is nervous/anxious and stressed with rentals and memory loss. The patient does not have insomnia.        Objective:     Physical Exam   Constitutional: He is oriented to person, place, and time. He appears well-developed and well-nourished.   HENT:   Head: Normocephalic.   Eyes: Conjunctivae and EOM are normal. Pupils are equal, round, and reactive to light.   Neck: Normal  "range of motion. Neck supple. No JVD present. No thyromegaly present.   Cardiovascular: regular rate, regular rhythm, soft heart sounds and intact distal pulses. Exam reveals no gallop and no friction rub.   No murmur heard.  No swelling.   Pulmonary/Chest: Effort normal. He has no wheezes. He has no rales. He exhibits no tenderness.   Abdominal: Soft. Bowel sounds are normal. There is no tenderness.  Protuberance, waist circumference 57.5" , hip 55 inches.   Musculoskeletal: Normal range of motion. He exhibits No tenderness (mild behind the left knee.). He exhibits no edema in left lower extremities.   Lymphadenopathy:   He has no cervical adenopathy.   Neurological: He is alert and oriented to person, place, and time.   Skin: Skin is warm and dry. No rash noted.   Venous stasis, bilateral with stasis dermatitis with multiple scratches.        Assessment:    Richard was seen today for 6 month f/u.    Diagnoses and all orders for this visit:    LV dysfunction, EF 40%    Hypertension, unspecified type  -     EKG 12-lead    Obstructive sleep apnea syndrome    Peripheral vascular disease    Paroxysmal atrial fibrillation    Other type of osteoarthritis, unspecified site    MTHFR mutation (methylenetetrahydrofolate reductase)    Morbid obesity, today BMI 41    Long term (current) use of anticoagulants    Hypertensive left ventricular hypertrophy, without heart failure    Pure hypercholesterolemia    Prerenal azotemia    Proteinuria, unspecified type    Status post insertion of drug eluting coronary artery stent, 3x, last RCA 1/2015    Thoracic aortic aneurysm without rupture    Bradycardia, drug induced    Weakness of both lower extremities        Plan:    Richard was seen today for 6 month f/u.    Diagnoses and all orders for this visit:    LV dysfunction, EF 40%    Hypertension, unspecified type  -     EKG 12-lead    Obstructive sleep apnea syndrome    Peripheral vascular disease    Paroxysmal atrial fibrillation    Other " type of osteoarthritis, unspecified site    MTHFR mutation (methylenetetrahydrofolate reductase)    Morbid obesity, today BMI 41    Long term (current) use of anticoagulants    Hypertensive left ventricular hypertrophy, without heart failure    Pure hypercholesterolemia    Prerenal azotemia    Proteinuria, unspecified type    Status post insertion of drug eluting coronary artery stent, 3x, last RCA 1/2015    Thoracic aortic aneurysm without rupture    Bradycardia, drug induced    Weakness of both lower extremities      - All medical issues reviewed, continue current Rx.  - CV status stable, all medications reviewed, patient acknowledge good understanding.  - Recommend healthy living: healthy diet and regular exercise aiming for fitness, and weight control  - Any activity is better than none.  - Check home blood pressure, 2 days weekly, do 2 readings within 5 minutes in AM and PM, keep log for review.  - Weigh twice weekly, try to lose 1-2 lbs per week, target loss of 5% to 10%.   - Highly recommend 30 minutes of exercise daily, can have Sunday off, with 2-3 sessions of muscle strengthening weekly. A  would be very helpful.  - Recommend at least biannual cardiovascular evaluation in view of his significant risk factors. Patient's preference  - Consider nuclear stress test at next visit.        Patient Active Problem List   Diagnosis    Diabetes mellitus with chronic kidney disease, stage III    MTHFR mutation (methylenetetrahydrofolate reductase)    Sleep apnea, using BiPAP nightly, 1999, last sleep test in 2013    Hypertension associated with diabetes    Atherosclerosis of native coronary artery with angina pectoris    Hyperlipidemia, baseline     Gout    GERD (gastroesophageal reflux disease)    Hypercoagulable state, Prothrombin mutation    Colon polyps    Anxiety    Morbid obesity, today BMI 41    Carotid disease, bilateral    Aortic aneurysm, thoracic, 4.3 cm, 8/2010    GARY  (generalized anxiety disorder)    CKD (chronic kidney disease) stage 3, GFR 30-59 ml/min, 41    Abnormal cardiovascular stress test    LV dysfunction, EF 40%    Long term (current) use of anticoagulants    OA (osteoarthritis). post bilateral THR, 2001    Diabetic neuropathy    H/O bariatric surgery, 2008    Osteoarthritis, knee    BPH with obstruction/lower urinary tract symptoms    Weakness of both lower extremities    Proteinuria    Paroxysmal atrial fibrillation    Stasis dermatitis of both legs    Type 2 diabetes mellitus with diabetic nephropathy    Hypertensive left ventricular hypertrophy, without heart failure    Prostate cancer    Sleep deprivation    Chronic fatigue    Ulcer of toe of left foot    Peripheral vascular disease    Facet arthropathy    Anemia, chronic disease    Uncomplicated opioid dependence    Chronically on benzodiazepine therapy    MCI (mild cognitive impairment)    Closed nondisplaced fracture of base of fourth metacarpal bone of left hand    Diabetic ulcer of toe of right foot associated with type 2 diabetes mellitus, with fat layer exposed    Ulcer of right lower leg, limited to breakdown of skin    Right foot ulcer, limited to breakdown of skin    Pain in right foot    Cardiomyopathy    History of MI (myocardial infarction), 2010    Status post insertion of drug eluting coronary artery stent, 3x, last RCA 1/2015    Encephalopathy acute    Hyperkalemia    Chronic systolic congestive heart failure    Elevated brain natriuretic peptide (BNP) level    Back pain of lumbosacral region with sciatica    Other spondylosis, lumbar region    Prerenal azotemia    Bradycardia, drug induced     Greater than 50% was spent in counseling and coordination of care. The above assessment and plan have been discussed at length. Labs and procedure over the last 6 months reviewed. Problem List updated. Asked to bring in all active medications / pills bottles with next  visit.

## 2020-01-31 ENCOUNTER — PATIENT MESSAGE (OUTPATIENT)
Dept: FAMILY MEDICINE | Facility: CLINIC | Age: 75
End: 2020-01-31

## 2020-02-06 ENCOUNTER — PATIENT OUTREACH (OUTPATIENT)
Dept: ADMINISTRATIVE | Facility: HOSPITAL | Age: 75
End: 2020-02-06

## 2020-02-06 ENCOUNTER — OFFICE VISIT (OUTPATIENT)
Dept: PODIATRY | Facility: CLINIC | Age: 75
End: 2020-02-06
Payer: MEDICARE

## 2020-02-06 VITALS
HEART RATE: 53 BPM | SYSTOLIC BLOOD PRESSURE: 134 MMHG | WEIGHT: 285 LBS | HEIGHT: 70 IN | DIASTOLIC BLOOD PRESSURE: 72 MMHG | BODY MASS INDEX: 40.8 KG/M2

## 2020-02-06 DIAGNOSIS — B35.1 ONYCHOMYCOSIS DUE TO DERMATOPHYTE: ICD-10-CM

## 2020-02-06 DIAGNOSIS — Z79.01 LONG TERM (CURRENT) USE OF ANTICOAGULANTS: ICD-10-CM

## 2020-02-06 DIAGNOSIS — L84 CORN OR CALLUS: ICD-10-CM

## 2020-02-06 DIAGNOSIS — Z87.898 HISTORY OF ULCERATION: ICD-10-CM

## 2020-02-06 DIAGNOSIS — E11.49 TYPE II DIABETES MELLITUS WITH NEUROLOGICAL MANIFESTATIONS: Primary | ICD-10-CM

## 2020-02-06 PROCEDURE — 3044F PR MOST RECENT HEMOGLOBIN A1C LEVEL <7.0%: ICD-10-PCS | Mod: CPTII,S$GLB,, | Performed by: PODIATRIST

## 2020-02-06 PROCEDURE — 3044F HG A1C LEVEL LT 7.0%: CPT | Mod: CPTII,S$GLB,, | Performed by: PODIATRIST

## 2020-02-06 PROCEDURE — 1100F PTFALLS ASSESS-DOCD GE2>/YR: CPT | Mod: CPTII,S$GLB,, | Performed by: PODIATRIST

## 2020-02-06 PROCEDURE — 11055 PARING/CUTG B9 HYPRKER LES 1: CPT | Mod: Q8,S$GLB,, | Performed by: PODIATRIST

## 2020-02-06 PROCEDURE — 3078F PR MOST RECENT DIASTOLIC BLOOD PRESSURE < 80 MM HG: ICD-10-PCS | Mod: CPTII,S$GLB,, | Performed by: PODIATRIST

## 2020-02-06 PROCEDURE — 1125F AMNT PAIN NOTED PAIN PRSNT: CPT | Mod: S$GLB,,, | Performed by: PODIATRIST

## 2020-02-06 PROCEDURE — 99999 PR PBB SHADOW E&M-EST. PATIENT-LVL III: CPT | Mod: PBBFAC,,, | Performed by: PODIATRIST

## 2020-02-06 PROCEDURE — 3075F PR MOST RECENT SYSTOLIC BLOOD PRESS GE 130-139MM HG: ICD-10-PCS | Mod: CPTII,S$GLB,, | Performed by: PODIATRIST

## 2020-02-06 PROCEDURE — 1100F PR PT FALLS ASSESS DOC 2+ FALLS/FALL W/INJURY/YR: ICD-10-PCS | Mod: CPTII,S$GLB,, | Performed by: PODIATRIST

## 2020-02-06 PROCEDURE — 11055 PR TRIM HYPERKERATOTIC SKIN LESION, ONE: ICD-10-PCS | Mod: Q8,S$GLB,, | Performed by: PODIATRIST

## 2020-02-06 PROCEDURE — 3288F PR FALLS RISK ASSESSMENT DOCUMENTED: ICD-10-PCS | Mod: CPTII,S$GLB,, | Performed by: PODIATRIST

## 2020-02-06 PROCEDURE — 11721 PR DEBRIDEMENT OF NAILS, 6 OR MORE: ICD-10-PCS | Mod: 59,Q8,S$GLB, | Performed by: PODIATRIST

## 2020-02-06 PROCEDURE — 3078F DIAST BP <80 MM HG: CPT | Mod: CPTII,S$GLB,, | Performed by: PODIATRIST

## 2020-02-06 PROCEDURE — 3288F FALL RISK ASSESSMENT DOCD: CPT | Mod: CPTII,S$GLB,, | Performed by: PODIATRIST

## 2020-02-06 PROCEDURE — 99213 PR OFFICE/OUTPT VISIT, EST, LEVL III, 20-29 MIN: ICD-10-PCS | Mod: 25,S$GLB,, | Performed by: PODIATRIST

## 2020-02-06 PROCEDURE — 1159F PR MEDICATION LIST DOCUMENTED IN MEDICAL RECORD: ICD-10-PCS | Mod: S$GLB,,, | Performed by: PODIATRIST

## 2020-02-06 PROCEDURE — 99213 OFFICE O/P EST LOW 20 MIN: CPT | Mod: 25,S$GLB,, | Performed by: PODIATRIST

## 2020-02-06 PROCEDURE — 1125F PR PAIN SEVERITY QUANTIFIED, PAIN PRESENT: ICD-10-PCS | Mod: S$GLB,,, | Performed by: PODIATRIST

## 2020-02-06 PROCEDURE — 3075F SYST BP GE 130 - 139MM HG: CPT | Mod: CPTII,S$GLB,, | Performed by: PODIATRIST

## 2020-02-06 PROCEDURE — 11721 DEBRIDE NAIL 6 OR MORE: CPT | Mod: 59,Q8,S$GLB, | Performed by: PODIATRIST

## 2020-02-06 PROCEDURE — 99999 PR PBB SHADOW E&M-EST. PATIENT-LVL III: ICD-10-PCS | Mod: PBBFAC,,, | Performed by: PODIATRIST

## 2020-02-06 PROCEDURE — 1159F MED LIST DOCD IN RCRD: CPT | Mod: S$GLB,,, | Performed by: PODIATRIST

## 2020-02-06 RX ORDER — AMMONIUM LACTATE 12 G/100G
CREAM TOPICAL
Qty: 140 G | Refills: 11 | Status: SHIPPED | OUTPATIENT
Start: 2020-02-06 | End: 2020-05-01

## 2020-02-06 RX ORDER — CICLOPIROX 80 MG/ML
SOLUTION TOPICAL NIGHTLY
Qty: 6.6 ML | Refills: 11 | Status: SHIPPED | OUTPATIENT
Start: 2020-02-06 | End: 2020-05-01

## 2020-02-06 NOTE — PROGRESS NOTES
Subjective:      Patient ID: Richard Bustos is a 74 y.o. male.    Chief Complaint: Diabetes Mellitus (TORI Irizarry 11-); Diabetic Foot Exam; and Foot Pain (Left foot)    Richard is a 74 y.o. male who presents to the clinic for evaluation and treatment of high risk feet. Richard has a past medical history of Anemia, Anemia of other chronic disease (9/13/2017), Anemia, chronic renal failure (9/13/2017), Anemia, unspecified (9/13/2017), Anticoagulant long-term use, Aorta aneurysm, Arthritis, Atrial fibrillation, CAD (coronary artery disease), CHF (congestive heart failure), Chronic kidney disease, Clotting disorder (9/13/2017), Colon polyp, Dementia, Diabetes mellitus, Diabetes mellitus type II, Diverticulosis, Elevated PSA, Encounter for blood transfusion, Former smoker, General anesthetics causing adverse effect in therapeutic use, GERD (gastroesophageal reflux disease), Gout, colonic polyps, Hypercoagulable state, Hyperlipidemia, Hypertension, MI, old (2010), MTHFR mutation, Myocardial infarction, Osteomyelitis of ankle or foot (3/9/2017), Prostate cancer (06/2016), Sleep apnea, Squamous cell carcinoma (01/23/2018), and Venous stasis. The patient's chief complaint is pain and bump under right big toe area.  Gradual onset, worsening over past several weeks, aggravated by increased weight bearing, shoe gear, pressure.  No previous medical treatment.  No self treatment.    CC2 thick long discolored misshapen toenails all toes.  Gradual onset, worsening over past several weeks, aggravated by increased weight bearing, shoe gear, pressure.  Periodic debridement helps symptoms.    PCP: Familia Ramirez Jr, MD    Date Last Seen by PCP:   Chief Complaint   Patient presents with    Diabetes Mellitus     TORI Irizarry 11-    Diabetic Foot Exam    Foot Pain     Left foot         Current shoe gear:  Affected Foot: sx shoe right     Unaffected Foot: Rx diabetic extra depth shoes and custom  accommodative insoles    History of Trauma: negative  Sign of Infection: none    Hemoglobin A1C   Date Value Ref Range Status   08/16/2019 5.6 4.0 - 5.6 % Final     Comment:     ADA Screening Guidelines:  5.7-6.4%  Consistent with prediabetes  >or=6.5%  Consistent with diabetes  High levels of fetal hemoglobin interfere with the HbA1C  assay. Heterozygous hemoglobin variants (HbS, HgC, etc)do  not significantly interfere with this assay.   However, presence of multiple variants may affect accuracy.     02/11/2019 6.2 (H) 4.0 - 5.6 % Final     Comment:     ADA Screening Guidelines:  5.7-6.4%  Consistent with prediabetes  >or=6.5%  Consistent with diabetes  High levels of fetal hemoglobin interfere with the HbA1C  assay. Heterozygous hemoglobin variants (HbS, HgC, etc)do  not significantly interfere with this assay.   However, presence of multiple variants may affect accuracy.     10/22/2018 5.9 (H) 4.0 - 5.6 % Final     Comment:     ADA Screening Guidelines:  5.7-6.4%  Consistent with prediabetes  >or=6.5%  Consistent with diabetes  High levels of fetal hemoglobin interfere with the HbA1C  assay. Heterozygous hemoglobin variants (HbS, HgC, etc)do  not significantly interfere with this assay.   However, presence of multiple variants may affect accuracy.     06/17/2013 6.8 (H) 4.8 - 5.6 % Final     Comment:              Increased risk for diabetes: 5.7 - 6.4           Diabetes: >6.4           Glycemic control for adults with diabetes: <7.0  **In order to standardize %HbA1c results worldwide, as of October 11, 2010,  the %HbA1c is being calculated using the master equation recommended in the  consensus statement adopted by the ADA (American Diabetes Assoc), EASD  (European Assoc for the Study of Diabetes), IFCC (International Federation  of Clinical Chemistry and Laboratory Medicine) and IDF (International  Diabetes Federation). Result units: %HgbA1c (DCCT/NGSP).  In common with other methods, Hb A1C values may not  accurately reflect mean  blood glucose in patients with hemoglobin variants (HgbF, HgbS and HgbC).  Any cause of shortened erythrocyte survival will reduce exposure of  erythrocytes to glucose with a consequent decrease in HbA1c (%) values, even  though the time-averaged blood glucose level may be elevated. Causes of  shortened erythrocyte lifetime might be hemolytic anemia or other hemolytic  diseases, homozygous sickle cell trait, pregnancy, recent significant or  chronic blood loss, etc. Caution should be used when interpreting the HbA1c  results from patients with these conditions.       Review of Systems   Constitution: Negative for chills, diaphoresis, fever, malaise/fatigue and night sweats.   Cardiovascular: Positive for leg swelling. Negative for claudication, cyanosis and syncope.   Skin: Positive for nail changes, poor wound healing and suspicious lesions. Negative for color change, dry skin, rash and unusual hair distribution.   Musculoskeletal: Negative for falls, joint pain, joint swelling, muscle cramps, muscle weakness and stiffness.   Gastrointestinal: Negative for constipation, diarrhea, nausea and vomiting.   Neurological: Positive for numbness, paresthesias and sensory change. Negative for brief paralysis, disturbances in coordination, focal weakness and tremors.           Objective:      Physical Exam   Constitutional: He appears well-developed and well-nourished. He is cooperative. No distress.   Cardiovascular:   Pulses:       Dorsalis pedis pulses are 1+ on the right side, and 1+ on the left side.        Posterior tibial pulses are 1+ on the right side, and 1+ on the left side.   Toes warm, pink, cap fill <5 seconds.   Lymphadenopathy:   Negative lymphadenopathy bilateral popliteal fossa and tarsal tunnel.     Neurological: A sensory deficit is present.   Diminished/loss of protective sensation all toes bilateral to 10 gram monofilament.       Skin: Skin is warm and dry. No abrasion, no  bruising, no burn, no ecchymosis, no laceration, no lesion and no rash noted. He is not diaphoretic. No erythema. No pallor.     Toenails 1st, 2nd, 3rd, 4th, 5th  bilateral are hypertrophic thickened 2-3 mm, dystrophic, discolored tanish brown with tan, gray crumbly subungual debris.  Tender to distal nail plate pressure, without periungual skin abnormality of each.    Focal hyperkeratotic lesion consisting entirely of hyperkeratotic tissue without open skin, drainage, pus, fluctuance, malodor, or signs of infection plantar right 1st mtpj.    Otherwise, Skin thin, shiny, atrophic, with decreased density and distribution of pedal hair bilateral, but without hyperpigmentation, minerva discoloration,  ulcers, masses, nodules or cords palpated bilateral feet and legs.                  Assessment:       Encounter Diagnoses   Name Primary?    Type II diabetes mellitus with neurological manifestations Yes    Long term (current) use of anticoagulants     Corn or callus     History of ulceration     Onychomycosis due to dermatophyte          Plan:       Richard was seen today for diabetes mellitus, diabetic foot exam and foot pain.    Diagnoses and all orders for this visit:    Type II diabetes mellitus with neurological manifestations    Long term (current) use of anticoagulants    Corn or callus    History of ulceration    Onychomycosis due to dermatophyte      I counseled the patient on his conditions, their implications and medical management.        The patient has received literature on basic diabetic foot care.  Patient will inspect feet daily, wear protective shoe gear when ambulatory, and apply moisturizer to skin as needed to maintain elasticity and help prevent ulceration.    Continue penlac, lac hydrin - refilled.    Dispense of new DM shoes, custom inserts today.    With the patient's permission, I debrided all ten toenails with a sterile nipper and curette, removing all offending nail and debris.  Patient  tolerated the procedure well and related significant relief.      With the patient's permission, I debrided hyperkeratotic lesion(s) as above totaling      1          to, not  Including dermis with sterile #15 blade.  Patient tolerated the procedure well and related significant relief.           Follow up in about 1 year (around 2/6/2021).

## 2020-02-06 NOTE — LETTER
AUTHORIZATION FOR RELEASE OF   CONFIDENTIAL INFORMATION    Dear Dr. Mariann Diop,    We are seeing Richard Bustos, date of birth 1945, in the clinic at [facility]. Familia Ramirez Jr, MD is the patient's PCP. Richard Bustos has an outstanding lab/procedure at the time we reviewed his chart. In order to help keep his health information updated, he has authorized us to request the following medical record(s):        (  )  MAMMOGRAM                                      (  )  COLONOSCOPY      (  )  PAP SMEAR                                          (  )  OUTSIDE LAB RESULTS     (  )  DEXA SCAN                                          ( X )  EYE EXAM            (  )  FOOT EXAM                                          (  )  ENTIRE RECORD     (  )  OUTSIDE IMMUNIZATIONS                 (  )  _______________         Please fax records to [facility], James H Newcomb Jr, MD, [phone]    Mirella Salazar LPN  Clinical Care Coordinator  [address] [address]  P: [phone]  F: [phone]            Patient Name: Richard Bustos  : 1945  Patient Phone #: [phone]

## 2020-02-11 DIAGNOSIS — D50.9 IRON DEFICIENCY ANEMIA: ICD-10-CM

## 2020-02-11 DIAGNOSIS — M54.40 BACK PAIN OF LUMBOSACRAL REGION WITH SCIATICA: ICD-10-CM

## 2020-02-11 RX ORDER — CARVEDILOL 25 MG/1
25 TABLET ORAL 2 TIMES DAILY WITH MEALS
Qty: 180 TABLET | Refills: 3 | Status: SHIPPED | OUTPATIENT
Start: 2020-02-11 | End: 2020-03-16

## 2020-02-12 RX ORDER — HYDROCODONE BITARTRATE AND ACETAMINOPHEN 7.5; 325 MG/1; MG/1
1 TABLET ORAL EVERY 6 HOURS PRN
Qty: 90 TABLET | Refills: 0 | Status: SHIPPED | OUTPATIENT
Start: 2020-02-12 | End: 2020-03-03

## 2020-02-12 RX ORDER — FERROUS SULFATE 325(65) MG
1 TABLET ORAL 2 TIMES DAILY
Qty: 180 TABLET | Refills: 1 | Status: SHIPPED | OUTPATIENT
Start: 2020-02-12 | End: 2020-12-19 | Stop reason: SDUPTHER

## 2020-02-17 ENCOUNTER — OFFICE VISIT (OUTPATIENT)
Dept: PAIN MEDICINE | Facility: CLINIC | Age: 75
End: 2020-02-17
Payer: MEDICARE

## 2020-02-17 VITALS
BODY MASS INDEX: 41.63 KG/M2 | SYSTOLIC BLOOD PRESSURE: 126 MMHG | HEIGHT: 70 IN | HEART RATE: 50 BPM | DIASTOLIC BLOOD PRESSURE: 65 MMHG | WEIGHT: 290.81 LBS

## 2020-02-17 DIAGNOSIS — M17.12 PRIMARY OSTEOARTHRITIS OF LEFT KNEE: Primary | ICD-10-CM

## 2020-02-17 DIAGNOSIS — M47.896 OTHER SPONDYLOSIS, LUMBAR REGION: ICD-10-CM

## 2020-02-17 PROCEDURE — 20610 DRAIN/INJ JOINT/BURSA W/O US: CPT | Mod: LT,S$GLB,, | Performed by: ANESTHESIOLOGY

## 2020-02-17 PROCEDURE — 3074F SYST BP LT 130 MM HG: CPT | Mod: CPTII,S$GLB,, | Performed by: ANESTHESIOLOGY

## 2020-02-17 PROCEDURE — 99999 PR PBB SHADOW E&M-EST. PATIENT-LVL III: ICD-10-PCS | Mod: PBBFAC,,, | Performed by: ANESTHESIOLOGY

## 2020-02-17 PROCEDURE — 1159F MED LIST DOCD IN RCRD: CPT | Mod: S$GLB,,, | Performed by: ANESTHESIOLOGY

## 2020-02-17 PROCEDURE — 99999 PR PBB SHADOW E&M-EST. PATIENT-LVL III: CPT | Mod: PBBFAC,,, | Performed by: ANESTHESIOLOGY

## 2020-02-17 PROCEDURE — 99214 OFFICE O/P EST MOD 30 MIN: CPT | Mod: 25,S$GLB,, | Performed by: ANESTHESIOLOGY

## 2020-02-17 PROCEDURE — 3078F PR MOST RECENT DIASTOLIC BLOOD PRESSURE < 80 MM HG: ICD-10-PCS | Mod: CPTII,S$GLB,, | Performed by: ANESTHESIOLOGY

## 2020-02-17 PROCEDURE — 1101F PR PT FALLS ASSESS DOC 0-1 FALLS W/OUT INJ PAST YR: ICD-10-PCS | Mod: CPTII,S$GLB,, | Performed by: ANESTHESIOLOGY

## 2020-02-17 PROCEDURE — 1101F PT FALLS ASSESS-DOCD LE1/YR: CPT | Mod: CPTII,S$GLB,, | Performed by: ANESTHESIOLOGY

## 2020-02-17 PROCEDURE — 3074F PR MOST RECENT SYSTOLIC BLOOD PRESSURE < 130 MM HG: ICD-10-PCS | Mod: CPTII,S$GLB,, | Performed by: ANESTHESIOLOGY

## 2020-02-17 PROCEDURE — 1159F PR MEDICATION LIST DOCUMENTED IN MEDICAL RECORD: ICD-10-PCS | Mod: S$GLB,,, | Performed by: ANESTHESIOLOGY

## 2020-02-17 PROCEDURE — 1125F AMNT PAIN NOTED PAIN PRSNT: CPT | Mod: S$GLB,,, | Performed by: ANESTHESIOLOGY

## 2020-02-17 PROCEDURE — 20610 LARGE JOINT ASPIRATION/INJECTION: L KNEE: ICD-10-PCS | Mod: LT,S$GLB,, | Performed by: ANESTHESIOLOGY

## 2020-02-17 PROCEDURE — 1125F PR PAIN SEVERITY QUANTIFIED, PAIN PRESENT: ICD-10-PCS | Mod: S$GLB,,, | Performed by: ANESTHESIOLOGY

## 2020-02-17 PROCEDURE — 3078F DIAST BP <80 MM HG: CPT | Mod: CPTII,S$GLB,, | Performed by: ANESTHESIOLOGY

## 2020-02-17 PROCEDURE — 99214 PR OFFICE/OUTPT VISIT, EST, LEVL IV, 30-39 MIN: ICD-10-PCS | Mod: 25,S$GLB,, | Performed by: ANESTHESIOLOGY

## 2020-02-17 RX ORDER — METHYLPREDNISOLONE ACETATE 40 MG/ML
40 INJECTION, SUSPENSION INTRA-ARTICULAR; INTRALESIONAL; INTRAMUSCULAR; SOFT TISSUE
Status: DISCONTINUED | OUTPATIENT
Start: 2020-02-17 | End: 2020-02-17 | Stop reason: HOSPADM

## 2020-02-17 RX ADMIN — METHYLPREDNISOLONE ACETATE 40 MG: 40 INJECTION, SUSPENSION INTRA-ARTICULAR; INTRALESIONAL; INTRAMUSCULAR; SOFT TISSUE at 09:02

## 2020-02-17 NOTE — PROGRESS NOTES
[facility] Hematology/Oncology  PROGRESS NOTE      Subjective:       Patient ID:   NAME: Richard Bustos : 1945     74 y.o. male    Referring Doc: Ashley  Other Physicians: Bebeto Torrez Chamberlain    Chief Complaint:  Clot disorder f/u    History of Present Illness:     Patient returns today for a regularly scheduled follow-up visit.  The patient is doing ok overall with foot ulcer and infection on right foot now resolved. He has been under the care of [facility] and Dr Torrez with podiatry .  He is breathing ok. He denies any current fever, CP, SOB, HA's or N/V. He is here by himself today. He had steroid injection in his knee yesterday. He has not been in hospital since 2019.                ROS:   GEN: normal without any fever, night sweats or weight loss  HEENT: normal with no HA's, sore throat, stiff neck, changes in vision  CV: normal with no CP, SOB, PND, THOMPSON or orthopnea  PULM: normal with no SOB, cough, hemoptysis, sputum or pleuritic pain  GI: normal with no abdominal pain, nausea, vomiting, constipation, diarrhea, melanotic stools, BRBPR, or hematemesis  : normal with no hematuria, dysuria  BREAST: normal with no mass, discharge, pain  SKIN: normal with no rash, erythema, bruising, or swelling    Allergies:  Review of patient's allergies indicates:   Allergen Reactions    Shellfish containing products Other (See Comments)     Other reaction(s): Gout       Medications:    Current Outpatient Medications:     allopurinol (ZYLOPRIM) 100 MG tablet, TAKE 1 TABLET(100 MG) BY MOUTH EVERY DAY (Patient taking differently: every evening. ), Disp: 90 tablet, Rfl: 3    ammonium lactate 12 % Crea, Apply twice daily to affected parts both feet as needed., Disp: 140 g, Rfl: 11    aspirin (ECOTRIN) 81 MG EC tablet, Take 81 mg by mouth once daily., Disp: , Rfl:     atorvastatin (LIPITOR) 80 MG tablet, Take 1 tablet (80 mg total) by mouth once daily., Disp: 90 tablet, Rfl: 3    baclofen (LIORESAL) 10 MG tablet,  Take 1 tablet (10 mg total) by mouth 2 (two) times daily. For muscle spasms, Disp: 60 tablet, Rfl: 11    calcitRIOL (ROCALTROL) 0.5 MCG Cap, once daily. , Disp: , Rfl: 4    carvediloL (COREG) 25 MG tablet, Take 1 tablet (25 mg total) by mouth 2 (two) times daily with meals., Disp: 180 tablet, Rfl: 3    ciclopirox (PENLAC) 8 % Soln, Apply topically nightly., Disp: 6.6 mL, Rfl: 11    clopidogrel (PLAVIX) 75 mg tablet, TAKE 1 TABLET BY MOUTH DAILY, Disp: 90 tablet, Rfl: 3    donepezil (ARICEPT) 5 MG tablet, donepezil 5 mg tablet, Disp: , Rfl:     ergocalciferol (ERGOCALCIFEROL) 50,000 unit Cap, ergocalciferol (vitamin D2) 1,250 mcg (50,000 unit) capsule  TK 1 C PO Q WK, Disp: , Rfl:     ferrous sulfate (FEOSOL) 325 mg (65 mg iron) Tab tablet, Take 1 tablet (325 mg total) by mouth 2 (two) times daily., Disp: 180 tablet, Rfl: 1    fluticasone propionate (FLONASE) 50 mcg/actuation nasal spray, 2 sprays (100 mcg total) by Each Nostril route once daily., Disp: 1 Bottle, Rfl: 11    FOLIC ACID/MULTIVIT-MIN/LUTEIN (CENTRUM SILVER ORAL), Take 1 tablet by mouth once daily., Disp: , Rfl:     furosemide (LASIX) 40 MG tablet, TAKE 1 TABLET BY MOUTH DAILY AS NEEDED, Disp: 90 tablet, Rfl: 3    HYDROcodone-acetaminophen (NORCO) 7.5-325 mg per tablet, Take 1 tablet by mouth every 6 (six) hours as needed for Pain., Disp: 90 tablet, Rfl: 0    lisinopril (PRINIVIL,ZESTRIL) 40 MG tablet, Take 1 tablet (40 mg total) by mouth once daily. (Patient taking differently: Take 40 mg by mouth every evening. ), Disp: 90 tablet, Rfl: 3    magnesium oxide (MAG-OX) 400 mg (241.3 mg magnesium) tablet, TAKE 1 TABLET BY MOUTH EVERY DAY, Disp: 90 tablet, Rfl: 3    magnesium oxide (MAG-OX) 400 mg (241.3 mg magnesium) tablet, magnesium oxide 400 mg (241.3 mg magnesium) tablet  TK 1 T PO  QD, Disp: , Rfl:     mecobal-levomefolat Ca-B6 phos (L-METHYL-B6-B12) 3-35-2 mg Tab, Take 1 tablet by mouth 2 (two) times daily., Disp: 180 tablet, Rfl: 3     mecobal-levomefolat Ca-B6 phos (L-METHYL-B6-B12) 3-35-2 mg Tab, Take 1 tablet by mouth 2 (two) times daily., Disp: 180 tablet, Rfl: 3    memantine (NAMENDA) 5 MG Tab, 2 (two) times daily. , Disp: , Rfl: 6    mirabegron (MYRBETRIQ) 50 mg Tb24, Take 1 tablet (50 mg total) by mouth once daily., Disp: 30 tablet, Rfl: 11    nitroGLYCERIN 0.4 MG/DOSE TL SPRY (NITROLINGUAL) 400 mcg/spray spray, PLACE 1 SPRAY UNDER THE TONGUE EVERY 5 MINUTES AS NEEDED FOR CHEST PAIN, Disp: 4.9 g, Rfl: 3    omeprazole (PRILOSEC) 20 MG capsule, TAKE 1 CAPSULE BY MOUTH DAILY, Disp: 90 capsule, Rfl: 3    paroxetine (PAXIL) 30 MG tablet, Take 1 tablet (30 mg total) by mouth every morning. (Patient taking differently: Take 30 mg by mouth once daily. ), Disp: 30 tablet, Rfl: 11    ranitidine (ZANTAC) 150 MG tablet, TAKE 1 TABLET(150 MG) BY MOUTH TWICE DAILY, Disp: 180 tablet, Rfl: 3    vit A/vit C/vit E/zinc/copper (PRESERVISION AREDS ORAL), PreserVision AREDS, Disp: , Rfl:     vit C-vit E-lutein-min-om-3 (OCUVITE) 545-34-4-150 mg-unit-mg-mg Cap, Take 1 capsule by mouth once daily., Disp: , Rfl:     ALPRAZolam (XANAX) 0.25 MG tablet, Take 1 tablet (0.25 mg total) by mouth nightly as needed for Anxiety., Disp: 30 tablet, Rfl: 0    calcium carbonate (TUMS ULTRA) 400 mg calcium (1,000 mg) Chew, Take 1 tablet (400 mg total) by mouth once daily., Disp: 30 each, Rfl: 11    hydrocortisone 2.5 % cream, Apply topically 2 (two) times daily. for 10 days, Disp: 1 Tube, Rfl: 2    warfarin (COUMADIN) 4 MG tablet, Take 1 tablet (4 mg total) by mouth Daily. (Patient taking differently: Take 5 mg by mouth Daily. ), Disp: 30 tablet, Rfl: 0  No current facility-administered medications for this visit.     PMHx/PSHx Updates:  See patient's last visit with me on 11/25/2019  See H&P on 4/9/2014        Pathology:  Cancer Staging  No matching staging information was found for the patient.          Objective:     Vitals:  Blood pressure (!) 116/55, pulse 75,  temperature 98.4 °F (36.9 °C), temperature source Oral, resp. rate 18, weight 132.3 kg (291 lb 11.2 oz).    Physical Examination:   GEN: no apparent distress, comfortable; AAOx3; overweight  HEAD: atraumatic and normocephalic  EYES: no pallor, no icterus, PERRLA  ENT: OMM, no pharyngeal erythema, external ears WNL; no nasal discharge; no thrush  NECK: no masses, thyroid normal, trachea midline, no LAD/LN's, supple  CV: RRR with no murmur; normal pulse; normal S1 and S2; no pedal edema  CHEST: Normal respiratory effort; CTAB; normal breath sounds; no wheeze or crackles  ABDOM: nontender and nondistended; soft; normal bowel sounds; no rebound/guarding; overweight  MUSC/Skeletal: ROM normal; no crepitus; joints normal; no deformities or arthropathy; uses cane to walk  EXTREM: erythematous changes on bilateral lower extremities;feet are better  SKIN: no rashes, petechiae or subcutaneous nodules; prior skin cancer on face s/p cream (resolved);   : no mcdaniels  NEURO: grossly intact; motor/sensory WNL; AAOx3; no tremors  PSYCH: normal mood, affect and behavior  LYMPH: normal cervical, supraclavicular, axillary and groin LN's            Labs:     2/13/2020    Lab Results   Component Value Date    WBC 7.00 02/13/2020    HGB 11.4 (L) 02/13/2020    HCT 33.7 (L) 02/13/2020     (H) 02/13/2020     02/13/2020          BMP  Lab Results   Component Value Date     02/13/2020    K 5.0 02/13/2020     (H) 02/13/2020    CO2 23 02/13/2020    BUN 36 (H) 02/13/2020    CREATININE 1.9 (H) 02/13/2020    CALCIUM 9.3 02/13/2020    ANIONGAP 9 02/13/2020    ESTGFRAFRICA 39.8 (A) 02/13/2020    EGFRNONAA 34.5 (A) 02/13/2020          Lab Results   Component Value Date    ALT 20 02/13/2020    AST 25 02/13/2020    ALKPHOS 80 02/13/2020    BILITOT 0.6 02/13/2020        Lab Results   Component Value Date    INR 1.5 (H) 02/13/2020    INR 2.0 (H) 01/23/2020    INR 1.0 01/10/2020         Radiology/Diagnostic Studies:    No results  found.    I have reviewed all available lab results and radiology reports.    Assessment/Plan:   (1) 74 y.o. male with diagnosis of chronic clot disorder with underlying MTHFR issues  - he was previously under the care of Dr Krunal Rodriguez with hematology at [facility]  - he has been on coumadin therapy per direction of Dr Tate with cardiology  - he has been having it adjusted by cardiology  - latest INR was 1.5 - followed and under direction of care by Dr Tate      (2) CAD - followed by Dr Tate    (3) Abdominal hematoma in past     (4) CRI - followed by Nephrology (Amy?)    (5) Chronic multifactorial anemia with underlying anemia of chronic disorders and chronic renal disease; chronic GIB  - latest hgb at  11.4 and better  - he is on oral iron    (6) Right foot diabetic ulcer/ prior left toe issues - followed by Dr Torrez - improved per patient    (7) Urology following for prostate - Dr Moss        1. Prostate cancer     2. Anemia, chronic disease     3. Long term (current) use of anticoagulants     4. Hypercoagulable state, Prothrombin mutation     5. MTHFR mutation (methylenetetrahydrofolate reductase)           PLAN:  1. Check labs every 3 months  2. F/u with PCP, card, and neph  3. Continue coumadin per Dr Tate's direction  4. He did not f/u with GI as directed at last visit  5. RTC in 6 months    Fax note to Bebeto Ramirez; Shen Torrez    Discussion:     I have explained all of the above in detail and the patient understands all of the current recommendation(s). I have answered all of their questions to the best of my ability and to their complete satisfaction.   The patient is to continue with the current management plan.            Electronically signed by Kai Ortiz MD

## 2020-02-17 NOTE — PROCEDURES
Large Joint Aspiration/Injection: L knee  Performed by: Evangelist Bose MD  Authorized by: Evangelist Bose MD  Date/Time: 2/17/2020 9:30 AM    Consent Done?:  Yes (Written)  Indications:  pain  Timeout: Immediately prior to procedure a time out was called to verify the correct patient, procedure, equipment, support staff and site/side marked as required.  Prep:Patient was prepped and draped in the usual sterile fashion.  Procedure site marked: Yes     Anesthesia  Local anesthesia not used (cold spray used for topicalization)        Details:   Needle size: 25 G    Ultrasonic Guidance for needle placement: No   Approach: anterolateral  Location:  Knee  Site:  L knee    Medications: 40 mg methylPREDNISolone acetate 40 mg/mL (Diluted in 4 mL of 0.25% bupivacaine)  Patient tolerance:  patient tolerated the procedure well with no immediate complications

## 2020-02-17 NOTE — PROGRESS NOTES
FOLLOW UP NOTE:     CHIEF COMPLAINT: left knee pain    INITIAL HISTORY OF PRESENT ILLNESS: Richard Bustos is a 74 y.o. male with PMH significant for CAD s/p PCI (ASA and Plavix), atrial fibrllation on coumadin, hx of bilateral hip replacements, CKD, HTN, and DORA presents for the evaluation of low back pain. Patient was last seen in the [facility] in 8/2017 where he received a bilateral L3, L4, and L5 RFA with great benefit. The patient presents today reporting of a recurrence of his low back pain that was well treated with last RFA. Patient localizes his pain to the center of his lower back without radiation. Patient describes his pain as an aching, burning, and throbbing type of pain. Patient reports that his current pain is a 7/10. Patient reports that his low back pain makes it difficult for him to ambulate. Patient denies of any urinary/fecal incontinence, saddle anesthesia, or weakness. Patient reports of requiring admission to the hospital for heat exhaustion but denies of any other significant interval changes in his health since his RFA.      Aggravating factors: worse in the AM, standing, and walking     Mitigating factors: RFA     Relevant Surgeries: yes; hx of bilateral hip replacements     INTERVAL HISTORY OF PRESENT ILLNESS: Richard Bustos is a 74 y.o. male with PMH significant for CAD s/p PCI (ASA and Plavix), atrial fibrllation on coumadin, hx of bilateral hip replacements, CKD, HTN, and DORA presents as an established patient for the continued management of left knee pain. The patient is s/p radiofrequency ablation of the L3, L4, L5 medial branch nerves bilaterally on 1/10/2020, and he reports of 50% relief with ongoing benefit. The patient reports of improvement in his gait since his RFA. Today, the patient is reporting of left knee pain. Patient reports that his current pain is a 3/10. The patient denies of any prior history of injections to his left knee. Patient denies of any  "urinary/fecal incontinence, saddle anesthesia, or weakness.     INTERVENTIONAL PAIN HISTORY:  1/10/2020: Radiofrequency ablation of the L3, L4, L5 medial branch nerves on the bilateral-side - 50% relief  8/3/2017: RFA at L3, 4, 5 - reports 80% relief    CURRENT PAIN MEDICATIONS:   Norco 7.5-325 mg PO TID  lyrica 75 mg PO BID    ROS:  Review of Systems   Constitutional: Negative for chills and fever.   HENT: Negative for tinnitus.    Eyes: Negative for visual disturbance.   Respiratory: Negative for shortness of breath.    Cardiovascular: Negative for chest pain.   Gastrointestinal: Negative for nausea and vomiting.   Genitourinary: Negative for difficulty urinating.   Musculoskeletal: Positive for arthralgias and back pain.   Skin: Negative for rash.   Allergic/Immunologic: Negative for immunocompromised state.   Neurological: Negative for syncope.   Hematological: Does not bruise/bleed easily.   Psychiatric/Behavioral: Negative for suicidal ideas.        MEDICAL, SURGICAL, FAMILY, SOCIAL HX: reviewed    MEDICATIONS/ALLERGIES: reviewed    PHYSICAL EXAM:    VITALS: Vitals reviewed.   Vitals:    02/17/20 0946   BP: 126/65   Pulse: (!) 50   Weight: 131.9 kg (290 lb 12.6 oz)   Height: 5' 10" (1.778 m)   PainSc:   3   PainLoc: Generalized       Physical Exam   Constitutional: He is oriented to person, place, and time and well-developed, well-nourished, and in no distress.   HENT:   Head: Normocephalic and atraumatic.   Eyes: Conjunctivae and EOM are normal. Right eye exhibits no discharge. Left eye exhibits no discharge.   Cardiovascular: Normal rate.   Pulmonary/Chest: Effort normal and breath sounds normal. No respiratory distress.   Abdominal: Soft.   Neurological: He is alert and oriented to person, place, and time.   Skin: Skin is warm and dry. No rash noted. He is not diaphoretic.   Psychiatric: Mood, memory, affect and judgment normal.   Nursing note and vitals reviewed.     UPPER EXTREMITIES: Normal alignment, " normal range of motion, no atrophy, no skin changes,  hair growth and nail growth normal and equal bilaterally. No swelling, no tenderness.    LOWER EXTREMITIES:  Normal alignment, normal range of motion, no atrophy, no skin changes,  hair growth and nail growth normal and equal bilaterally. No swelling, no tenderness.    Knee exam:    Extension/flexion equal bilaterally.   Positive for crepitus with motion in the left knee   Negative for effusion both knees.    Positive joint line tenderness of the left knee     LUMBAR SPINE  Lumbar spine: ROM is significantly limited with flexion extension and oblique extension with increased pain.    Supine straight leg raise: unable to perform secondary to fall risk   ((+)) Facet loading bilaterally  Internal and external rotation of the hips: unable to perform secondary to fall risk  Myofascial exam: No tenderness to palpation across lumbar paraspinous muscles.    MOTOR: Tone and bulk: normal bilateral upper and lower Strength: normal   Delt      Bi         Tri        WE      WF                        R          5          5          5          5          5          5            L          5          5          5          5          5          5               IP         ADD     ABD     Quad   TA        Gas      HAM  R          5          5          5          5          5          5          5  L          5          5          5          5          5          5          5     SENSATION: Light touch and pinprick intact bilaterally  REFLEXES: normal, symmetric, nonbrisk.  Toes down, no clonus. Negative roca's sign bilaterally.  GAIT: normal rise, base, steps, and arm swing.      IMAGING: no new imaging to review  X-ray left knee (1/14/2020):  Prominent medial joint space narrowing is noted.  Tricompartmental osteophyte formation is present and there is a joint effusion on the lateral view.  Vascular calcifications are present.  Increasing callus formation is noted along  the sagittally oriented patellar fracture    ASSESSMENT:  Richard Bustos is a 74 y.o. male with PMH significant for CAD s/p PCI (ASA and Plavix), atrial fibrllation on coumadin, hx of bilateral hip replacements, CKD, HTN, and DORA presents as an established patient for the continued management of left knee pain. The patient reports that the lumbar RFA provided him with good benefit. The patient reports that his left knee bothers him the most today. Prior plain film was significant for OA of the left knee. Treatment plan outlined below.     PLAN:  1. Left knee intra-articular steroid injection performed today in clinic  2. Continue Norco 7.5-325 mg PO BID PRN, OTC aleve and OTC Tylenol as prescribed by PCP  3. I have stressed the importance of physical activity and a home exercise plan to help with chronic pain and improve health.  4. RTC in 1 month for follow-up    Evangelist Bose MD  Pain Management

## 2020-02-18 ENCOUNTER — OFFICE VISIT (OUTPATIENT)
Dept: HEMATOLOGY/ONCOLOGY | Facility: CLINIC | Age: 75
End: 2020-02-18
Payer: MEDICARE

## 2020-02-18 VITALS
SYSTOLIC BLOOD PRESSURE: 116 MMHG | TEMPERATURE: 98 F | WEIGHT: 291.69 LBS | RESPIRATION RATE: 18 BRPM | DIASTOLIC BLOOD PRESSURE: 55 MMHG | BODY MASS INDEX: 41.85 KG/M2 | HEART RATE: 75 BPM

## 2020-02-18 DIAGNOSIS — C61 PROSTATE CANCER: Primary | ICD-10-CM

## 2020-02-18 DIAGNOSIS — D68.59 HYPERCOAGULABLE STATE: ICD-10-CM

## 2020-02-18 DIAGNOSIS — Z79.01 LONG TERM (CURRENT) USE OF ANTICOAGULANTS: ICD-10-CM

## 2020-02-18 DIAGNOSIS — D63.8 ANEMIA, CHRONIC DISEASE: ICD-10-CM

## 2020-02-18 DIAGNOSIS — Z15.89 MTHFR MUTATION (METHYLENETETRAHYDROFOLATE REDUCTASE): ICD-10-CM

## 2020-02-18 PROCEDURE — 1126F PR PAIN SEVERITY QUANTIFIED, NO PAIN PRESENT: ICD-10-PCS | Mod: S$GLB,,, | Performed by: INTERNAL MEDICINE

## 2020-02-18 PROCEDURE — 3074F PR MOST RECENT SYSTOLIC BLOOD PRESSURE < 130 MM HG: ICD-10-PCS | Mod: S$GLB,,, | Performed by: INTERNAL MEDICINE

## 2020-02-18 PROCEDURE — 3078F DIAST BP <80 MM HG: CPT | Mod: S$GLB,,, | Performed by: INTERNAL MEDICINE

## 2020-02-18 PROCEDURE — 99213 PR OFFICE/OUTPT VISIT, EST, LEVL III, 20-29 MIN: ICD-10-PCS | Mod: S$GLB,,, | Performed by: INTERNAL MEDICINE

## 2020-02-18 PROCEDURE — 99213 OFFICE O/P EST LOW 20 MIN: CPT | Mod: S$GLB,,, | Performed by: INTERNAL MEDICINE

## 2020-02-18 PROCEDURE — 1101F PR PT FALLS ASSESS DOC 0-1 FALLS W/OUT INJ PAST YR: ICD-10-PCS | Mod: S$GLB,,, | Performed by: INTERNAL MEDICINE

## 2020-02-18 PROCEDURE — 3074F SYST BP LT 130 MM HG: CPT | Mod: S$GLB,,, | Performed by: INTERNAL MEDICINE

## 2020-02-18 PROCEDURE — 1126F AMNT PAIN NOTED NONE PRSNT: CPT | Mod: S$GLB,,, | Performed by: INTERNAL MEDICINE

## 2020-02-18 PROCEDURE — 1101F PT FALLS ASSESS-DOCD LE1/YR: CPT | Mod: S$GLB,,, | Performed by: INTERNAL MEDICINE

## 2020-02-18 PROCEDURE — 1159F MED LIST DOCD IN RCRD: CPT | Mod: S$GLB,,, | Performed by: INTERNAL MEDICINE

## 2020-02-18 PROCEDURE — 3078F PR MOST RECENT DIASTOLIC BLOOD PRESSURE < 80 MM HG: ICD-10-PCS | Mod: S$GLB,,, | Performed by: INTERNAL MEDICINE

## 2020-02-18 PROCEDURE — 1159F PR MEDICATION LIST DOCUMENTED IN MEDICAL RECORD: ICD-10-PCS | Mod: S$GLB,,, | Performed by: INTERNAL MEDICINE

## 2020-02-19 ENCOUNTER — DOCUMENTATION ONLY (OUTPATIENT)
Dept: FAMILY MEDICINE | Facility: CLINIC | Age: 75
End: 2020-02-19

## 2020-02-20 ENCOUNTER — OFFICE VISIT (OUTPATIENT)
Dept: FAMILY MEDICINE | Facility: CLINIC | Age: 75
End: 2020-02-20
Payer: MEDICARE

## 2020-02-20 VITALS
TEMPERATURE: 98 F | HEART RATE: 54 BPM | HEIGHT: 70 IN | OXYGEN SATURATION: 97 % | DIASTOLIC BLOOD PRESSURE: 78 MMHG | SYSTOLIC BLOOD PRESSURE: 134 MMHG | BODY MASS INDEX: 42.71 KG/M2 | WEIGHT: 298.31 LBS | RESPIRATION RATE: 18 BRPM

## 2020-02-20 DIAGNOSIS — Z15.89 MTHFR MUTATION (METHYLENETETRAHYDROFOLATE REDUCTASE): ICD-10-CM

## 2020-02-20 DIAGNOSIS — F41.9 ANXIETY: ICD-10-CM

## 2020-02-20 DIAGNOSIS — N18.30 TYPE 2 DIABETES MELLITUS WITH STAGE 3 CHRONIC KIDNEY DISEASE, WITHOUT LONG-TERM CURRENT USE OF INSULIN: Primary | Chronic | ICD-10-CM

## 2020-02-20 DIAGNOSIS — K21.9 GASTROESOPHAGEAL REFLUX DISEASE WITHOUT ESOPHAGITIS: ICD-10-CM

## 2020-02-20 DIAGNOSIS — E11.22 TYPE 2 DIABETES MELLITUS WITH STAGE 3 CHRONIC KIDNEY DISEASE, WITHOUT LONG-TERM CURRENT USE OF INSULIN: Primary | Chronic | ICD-10-CM

## 2020-02-20 DIAGNOSIS — E11.59 HYPERTENSION ASSOCIATED WITH DIABETES: ICD-10-CM

## 2020-02-20 DIAGNOSIS — E66.01 MORBID OBESITY: ICD-10-CM

## 2020-02-20 DIAGNOSIS — Z79.01 LONG TERM (CURRENT) USE OF ANTICOAGULANTS: ICD-10-CM

## 2020-02-20 DIAGNOSIS — F11.20 UNCOMPLICATED OPIOID DEPENDENCE: ICD-10-CM

## 2020-02-20 DIAGNOSIS — I50.22 CHRONIC SYSTOLIC CONGESTIVE HEART FAILURE: ICD-10-CM

## 2020-02-20 DIAGNOSIS — E11.42 DIABETIC POLYNEUROPATHY ASSOCIATED WITH TYPE 2 DIABETES MELLITUS: ICD-10-CM

## 2020-02-20 DIAGNOSIS — I15.2 HYPERTENSION ASSOCIATED WITH DIABETES: ICD-10-CM

## 2020-02-20 DIAGNOSIS — I73.9 PERIPHERAL VASCULAR DISEASE: ICD-10-CM

## 2020-02-20 DIAGNOSIS — I65.23 BILATERAL CAROTID ARTERY STENOSIS: Chronic | ICD-10-CM

## 2020-02-20 DIAGNOSIS — E78.2 MIXED HYPERLIPIDEMIA: ICD-10-CM

## 2020-02-20 DIAGNOSIS — C61 PROSTATE CANCER: ICD-10-CM

## 2020-02-20 DIAGNOSIS — N18.30 CKD (CHRONIC KIDNEY DISEASE) STAGE 3, GFR 30-59 ML/MIN: ICD-10-CM

## 2020-02-20 DIAGNOSIS — I25.119 ATHEROSCLEROSIS OF NATIVE CORONARY ARTERY OF NATIVE HEART WITH ANGINA PECTORIS: Chronic | ICD-10-CM

## 2020-02-20 DIAGNOSIS — D68.59 HYPERCOAGULABLE STATE: ICD-10-CM

## 2020-02-20 PROCEDURE — 1159F MED LIST DOCD IN RCRD: CPT | Mod: S$GLB,,, | Performed by: FAMILY MEDICINE

## 2020-02-20 PROCEDURE — 80307 DRUG TEST PRSMV CHEM ANLYZR: CPT

## 2020-02-20 PROCEDURE — 3075F SYST BP GE 130 - 139MM HG: CPT | Mod: CPTII,S$GLB,, | Performed by: FAMILY MEDICINE

## 2020-02-20 PROCEDURE — 1125F AMNT PAIN NOTED PAIN PRSNT: CPT | Mod: S$GLB,,, | Performed by: FAMILY MEDICINE

## 2020-02-20 PROCEDURE — 99214 OFFICE O/P EST MOD 30 MIN: CPT | Mod: S$GLB,,, | Performed by: FAMILY MEDICINE

## 2020-02-20 PROCEDURE — 99499 RISK ADDL DX/OHS AUDIT: ICD-10-PCS | Mod: S$GLB,,, | Performed by: FAMILY MEDICINE

## 2020-02-20 PROCEDURE — 99214 PR OFFICE/OUTPT VISIT, EST, LEVL IV, 30-39 MIN: ICD-10-PCS | Mod: S$GLB,,, | Performed by: FAMILY MEDICINE

## 2020-02-20 PROCEDURE — 3075F PR MOST RECENT SYSTOLIC BLOOD PRESS GE 130-139MM HG: ICD-10-PCS | Mod: CPTII,S$GLB,, | Performed by: FAMILY MEDICINE

## 2020-02-20 PROCEDURE — 99999 PR PBB SHADOW E&M-EST. PATIENT-LVL III: CPT | Mod: PBBFAC,,, | Performed by: FAMILY MEDICINE

## 2020-02-20 PROCEDURE — 99499 UNLISTED E&M SERVICE: CPT | Mod: S$GLB,,, | Performed by: FAMILY MEDICINE

## 2020-02-20 PROCEDURE — 1101F PT FALLS ASSESS-DOCD LE1/YR: CPT | Mod: CPTII,S$GLB,, | Performed by: FAMILY MEDICINE

## 2020-02-20 PROCEDURE — 1159F PR MEDICATION LIST DOCUMENTED IN MEDICAL RECORD: ICD-10-PCS | Mod: S$GLB,,, | Performed by: FAMILY MEDICINE

## 2020-02-20 PROCEDURE — 3044F HG A1C LEVEL LT 7.0%: CPT | Mod: CPTII,S$GLB,, | Performed by: FAMILY MEDICINE

## 2020-02-20 PROCEDURE — 99999 PR PBB SHADOW E&M-EST. PATIENT-LVL III: ICD-10-PCS | Mod: PBBFAC,,, | Performed by: FAMILY MEDICINE

## 2020-02-20 PROCEDURE — 3078F DIAST BP <80 MM HG: CPT | Mod: CPTII,S$GLB,, | Performed by: FAMILY MEDICINE

## 2020-02-20 PROCEDURE — 3078F PR MOST RECENT DIASTOLIC BLOOD PRESSURE < 80 MM HG: ICD-10-PCS | Mod: CPTII,S$GLB,, | Performed by: FAMILY MEDICINE

## 2020-02-20 PROCEDURE — 1101F PR PT FALLS ASSESS DOC 0-1 FALLS W/OUT INJ PAST YR: ICD-10-PCS | Mod: CPTII,S$GLB,, | Performed by: FAMILY MEDICINE

## 2020-02-20 PROCEDURE — 3044F PR MOST RECENT HEMOGLOBIN A1C LEVEL <7.0%: ICD-10-PCS | Mod: CPTII,S$GLB,, | Performed by: FAMILY MEDICINE

## 2020-02-20 PROCEDURE — 1125F PR PAIN SEVERITY QUANTIFIED, PAIN PRESENT: ICD-10-PCS | Mod: S$GLB,,, | Performed by: FAMILY MEDICINE

## 2020-02-21 LAB
AMPHET+METHAMPHET UR QL: NEGATIVE
BARBITURATES UR QL SCN>200 NG/ML: NEGATIVE
BENZODIAZ UR QL SCN>200 NG/ML: NEGATIVE
BZE UR QL SCN: NEGATIVE
CANNABINOIDS UR QL SCN: NEGATIVE
CREAT UR-MCNC: 48 MG/DL (ref 23–375)
ETHANOL UR-MCNC: <10 MG/DL
METHADONE UR QL SCN>300 NG/ML: NEGATIVE
OPIATES UR QL SCN: NEGATIVE
PCP UR QL SCN>25 NG/ML: NEGATIVE
TOXICOLOGY INFORMATION: NORMAL

## 2020-02-23 PROBLEM — L97.511 RIGHT FOOT ULCER, LIMITED TO BREAKDOWN OF SKIN: Status: RESOLVED | Noted: 2019-03-24 | Resolved: 2020-02-23

## 2020-02-23 PROBLEM — L97.512 DIABETIC ULCER OF TOE OF RIGHT FOOT ASSOCIATED WITH TYPE 2 DIABETES MELLITUS, WITH FAT LAYER EXPOSED: Status: RESOLVED | Noted: 2018-11-06 | Resolved: 2020-02-23

## 2020-02-23 PROBLEM — L97.529 ULCER OF TOE OF LEFT FOOT: Status: RESOLVED | Noted: 2017-02-16 | Resolved: 2020-02-23

## 2020-02-23 PROBLEM — E11.621 DIABETIC ULCER OF TOE OF RIGHT FOOT ASSOCIATED WITH TYPE 2 DIABETES MELLITUS, WITH FAT LAYER EXPOSED: Status: RESOLVED | Noted: 2018-11-06 | Resolved: 2020-02-23

## 2020-02-23 PROBLEM — R79.89 PRERENAL AZOTEMIA: Status: RESOLVED | Noted: 2020-01-27 | Resolved: 2020-02-23

## 2020-02-23 PROBLEM — Z79.899 CHRONICALLY ON BENZODIAZEPINE THERAPY: Status: RESOLVED | Noted: 2018-08-13 | Resolved: 2020-02-23

## 2020-02-23 PROBLEM — M79.671 PAIN IN RIGHT FOOT: Status: RESOLVED | Noted: 2019-03-24 | Resolved: 2020-02-23

## 2020-02-23 PROBLEM — G93.40 ENCEPHALOPATHY ACUTE: Status: RESOLVED | Noted: 2019-10-18 | Resolved: 2020-02-23

## 2020-02-23 PROBLEM — S62.345A CLOSED NONDISPLACED FRACTURE OF BASE OF FOURTH METACARPAL BONE OF LEFT HAND: Status: RESOLVED | Noted: 2018-10-12 | Resolved: 2020-02-23

## 2020-02-23 PROBLEM — R79.89 ELEVATED BRAIN NATRIURETIC PEPTIDE (BNP) LEVEL: Status: RESOLVED | Noted: 2019-10-24 | Resolved: 2020-02-23

## 2020-02-23 PROBLEM — E87.5 HYPERKALEMIA: Status: RESOLVED | Noted: 2019-10-19 | Resolved: 2020-02-23

## 2020-02-23 PROBLEM — L97.911: Status: RESOLVED | Noted: 2019-03-24 | Resolved: 2020-02-23

## 2020-02-23 NOTE — PROGRESS NOTES
Subjective:       Patient ID: Richard Bustos is a 74 y.o. male.    Chief Complaint: Diabetes; Hypertension; Hyperlipidemia; Coronary Artery Disease; Gastroesophageal Reflux; Chronic back pain; and Hypercoagulable state    Patient presents here for 6 month follow-up of diabetes, hypertension, hyperlipidemia, CAD, GERD, chronic back pain, and hypercoagulable state.  His weight has increased 7 lb in the last 6 months.  I saw him last 6 months ago but he was seen  3 months ago by the physician's assistant due to the fact that he is on chronic narcotic therapy.  His diabetes is well controlled and his most recent hemoglobin A1c was 6.0%.  He is achieving this on diet control only.  He does have diabetic neuropathy as well as diabetic nephropathy.  He is up-to-date with his eye exams and has no retinopathy.  As far as his hypertension, this is well controlled also on his present medication and his low-sodium diet.  His hyperlipidemia is extremely well controlled with atorvastatin 80 mg daily.  His most recent LDL was down to 51.  As far as his coronary artery disease, he denies any chest pains or palpitations and does see Cardiology on a regular basis.  His GERD is well controlled with his present dose of omeprazole.  He does have hypercoagulable state due to a genetic deficiency and has been on Coumadin for many years.  He gets his Cou[facility] and this is remained in therapeutic range.  He does have chronic back pain and has been on chronic opioid therapy for several years.  He had a urine drug screen in October that was negative for any metabolites of hydrocodone even though he states he has been taking on a regular basis.  He had another urine drug screen in November that was completely negative although it does not specifically mention testing for metabolites of hydrocodone.  Therefore I will do another urine drug screen today.  He has verbally told me that he is taking his hydrocodone  every 6 hr meaning 4 times daily.  He has been getting it filled on a regular basis as though he has been taking it this long.  If the urine drug screen today is negative for any components of hydrocodone or its metabolites, this will prove that the patient is not taking medication as prescribed or possibly not all and this will be discontinued.  As far as health maintenance, he is up-to-date on all of his regularly scheduled preventive items and immunizations with the exception of a shingles vaccine.    Diabetes   He presents for his follow-up diabetic visit. He has type 2 diabetes mellitus. His disease course has been stable. There are no hypoglycemic associated symptoms. Pertinent negatives for hypoglycemia include no dizziness, headaches or nervousness/anxiousness. Associated symptoms include foot paresthesias. Pertinent negatives for diabetes include no chest pain, no fatigue, no polydipsia, no polyuria and no visual change. There are no hypoglycemic complications. Symptoms are stable. Diabetic complications include heart disease, nephropathy, peripheral neuropathy and PVD. Pertinent negatives for diabetic complications include no CVA or retinopathy. Risk factors for coronary artery disease include diabetes mellitus, dyslipidemia, hypertension, male sex, obesity and sedentary lifestyle. Current diabetic treatment includes diet. He is compliant with treatment most of the time. His weight is stable. He is following a diabetic, low fat/cholesterol and low salt diet. Meal planning includes avoidance of concentrated sweets. He has had a previous visit with a dietitian. He never participates in exercise. There is no change in his home blood glucose trend. An ACE inhibitor/angiotensin II receptor blocker is being taken. He sees a podiatrist.Eye exam is current.   Hypertension   This is a chronic problem. The problem is unchanged. The problem is controlled. Pertinent negatives include no chest pain, headaches,  palpitations or shortness of breath. Risk factors for coronary artery disease include diabetes mellitus, dyslipidemia, family history, male gender, obesity and sedentary lifestyle. Past treatments include ACE inhibitors and beta blockers. The current treatment provides moderate improvement. Compliance problems include exercise.  Hypertensive end-organ damage includes kidney disease, CAD/MI and PVD. There is no history of CVA, heart failure or retinopathy. Identifiable causes of hypertension include chronic renal disease.   Hyperlipidemia   This is a chronic problem. The problem is controlled. Recent lipid tests were reviewed and are normal. Exacerbating diseases include chronic renal disease, diabetes and obesity. Factors aggravating his hyperlipidemia include beta blockers. Pertinent negatives include no chest pain or shortness of breath. Current antihyperlipidemic treatment includes statins. The current treatment provides significant improvement of lipids. Compliance problems include adherence to exercise.  Risk factors for coronary artery disease include diabetes mellitus, dyslipidemia, family history, hypertension, male sex, obesity and a sedentary lifestyle.     Review of Systems   Constitutional: Negative for chills, fatigue, fever and unexpected weight change.   HENT: Negative for congestion, ear pain, postnasal drip and sore throat.    Eyes: Negative for pain and visual disturbance.        Up-to-date with eye exams   Respiratory: Negative for cough and shortness of breath.    Cardiovascular: Positive for leg swelling. Negative for chest pain and palpitations.   Gastrointestinal: Negative for abdominal pain, diarrhea, nausea and vomiting.   Endocrine: Negative for polydipsia and polyuria.   Genitourinary: Negative for difficulty urinating, dysuria, flank pain and frequency.        Nocturia times once per night   Musculoskeletal: Positive for arthralgias and back pain.   Neurological: Negative for dizziness,  syncope, light-headedness and headaches.   Hematological: Negative for adenopathy. Bruises/bleeds easily.   Psychiatric/Behavioral: Negative for sleep disturbance. The patient is not nervous/anxious.        Objective:      Physical Exam   Constitutional: He is oriented to person, place, and time.   Morbidly obese elderly male in no acute distress at this time   HENT:   Right Ear: External ear normal.   Left Ear: External ear normal.   Nose: Nose normal.   Mouth/Throat: Oropharynx is clear and moist.   Neck: Normal range of motion. Neck supple. No thyromegaly present.   Cardiovascular: Normal rate, regular rhythm, normal heart sounds and intact distal pulses.   No murmur heard.  Pulmonary/Chest: Effort normal and breath sounds normal. He has no wheezes. He has no rales.   Musculoskeletal: Normal range of motion. He exhibits edema.   Decreased range of motion of lumbar spine without pain   Lymphadenopathy:     He has no cervical adenopathy.   Neurological: He is alert and oriented to person, place, and time. He has normal reflexes. No cranial nerve deficit.   Psychiatric: He has a normal mood and affect. His behavior is normal.   Vitals reviewed.      Assessment:       1. Type 2 diabetes mellitus with stage 3 chronic kidney disease, without long-term current use of insulin    2. Hypertension associated with diabetes    3. Mixed hyperlipidemia    4. Atherosclerosis of native coronary artery of native heart with angina pectoris    5. Bilateral carotid artery stenosis    6. MTHFR mutation (methylenetetrahydrofolate reductase)    7. Gastroesophageal reflux disease without esophagitis    8. Hypercoagulable state, Prothrombin mutation    9. Uncomplicated opioid dependence    10. Anxiety    11. CKD (chronic kidney disease) stage 3, GFR 30-59 ml/min, 41    12. Morbid obesity    13. Long term (current) use of anticoagulants    14. Diabetic polyneuropathy associated with type 2 diabetes mellitus    15. Prostate cancer    16.  Peripheral vascular disease    17. Chronic systolic congestive heart failure        Plan:       1.  Continue present medications as his multiple medical problems all noted above are stable and controlled  2.  Continue low-sodium, low-fat low-cholesterol, diabetic diet and try to get some exercise for weight loss  3.  Continue follow-up with specialists including Cardiology and the [facility]  4.  Urine tox screen today; please see discussion above  5.  Follow up with me in 6 months but with the [facility] in 3 months        Patient readiness: acceptance and barriers:none    During the course of the visit the patient was educated and counseled about the following:     Diabetes:  Addressed ADA diet.  Suggested low cholesterol diet.  Encouraged aerobic exercise.  Discussed foot care.  Reminded to get yearly retinal exam.  Discussed ways to avoid symptomatic hypoglycemia.  Discussed sick day management.  Reminded to bring in blood sugar diary at next visit.  Follow up in 6 months or as needed.  Hypertension:   Dietary sodium restriction.  Regular aerobic exercise.  Follow up: 6 months and as needed.  Obesity:   General weight loss/lifestyle modification strategies discussed (elicit support from others; identify saboteurs; non-food rewards, etc).  Diet interventions: moderate (500 kCal/d) deficit diet.  Informal exercise measures discussed, e.g. taking stairs instead of elevator.    Goals: Diabetes: Maintain Hemoglobin A1C below 7, Hypertension: Reduce Blood Pressure and Obesity: Reduce calorie intake and BMI    Did patient meet goals/outcomes: Yes    The following self management tools provided: blood pressure log  blood glucose log    Patient Instructions (the written plan) was given to the patient/family.     Time spent with patient: 30 minutes    Barriers to medications present (no )    Adverse reactions to current medications (no)    Over the counter medications reviewed (Yes)

## 2020-03-07 DIAGNOSIS — E78.5 HYPERLIPIDEMIA: ICD-10-CM

## 2020-03-08 RX ORDER — ATORVASTATIN CALCIUM 80 MG/1
TABLET, FILM COATED ORAL
Qty: 90 TABLET | Refills: 3 | Status: SHIPPED | OUTPATIENT
Start: 2020-03-08 | End: 2021-03-08 | Stop reason: SDUPTHER

## 2020-03-16 DIAGNOSIS — R79.89 ELEVATED BRAIN NATRIURETIC PEPTIDE (BNP) LEVEL: ICD-10-CM

## 2020-03-16 DIAGNOSIS — N18.30 TYPE 2 DIABETES MELLITUS WITH STAGE 3 CHRONIC KIDNEY DISEASE, WITHOUT LONG-TERM CURRENT USE OF INSULIN: Chronic | ICD-10-CM

## 2020-03-16 DIAGNOSIS — I10 HYPERTENSION, UNSPECIFIED TYPE: ICD-10-CM

## 2020-03-16 DIAGNOSIS — N18.30 CKD (CHRONIC KIDNEY DISEASE) STAGE 3, GFR 30-59 ML/MIN: ICD-10-CM

## 2020-03-16 DIAGNOSIS — I51.9 LV DYSFUNCTION: ICD-10-CM

## 2020-03-16 DIAGNOSIS — I50.22 CHRONIC SYSTOLIC CONGESTIVE HEART FAILURE: ICD-10-CM

## 2020-03-16 DIAGNOSIS — Z15.89 MTHFR MUTATION (METHYLENETETRAHYDROFOLATE REDUCTASE): ICD-10-CM

## 2020-03-16 DIAGNOSIS — I48.0 PAROXYSMAL ATRIAL FIBRILLATION: ICD-10-CM

## 2020-03-16 DIAGNOSIS — E11.22 TYPE 2 DIABETES MELLITUS WITH STAGE 3 CHRONIC KIDNEY DISEASE, WITHOUT LONG-TERM CURRENT USE OF INSULIN: Chronic | ICD-10-CM

## 2020-03-16 RX ORDER — LISINOPRIL 40 MG/1
40 TABLET ORAL NIGHTLY
Qty: 90 TABLET | Refills: 3 | Status: SHIPPED | OUTPATIENT
Start: 2020-03-16 | End: 2021-03-08 | Stop reason: SDUPTHER

## 2020-03-16 RX ORDER — WARFARIN SODIUM 5 MG/1
TABLET ORAL
Qty: 90 TABLET | Refills: 3 | Status: SHIPPED | OUTPATIENT
Start: 2020-03-16 | End: 2021-03-08 | Stop reason: SDUPTHER

## 2020-03-16 RX ORDER — CARVEDILOL 25 MG/1
TABLET ORAL
Qty: 180 TABLET | Refills: 3 | Status: SHIPPED | OUTPATIENT
Start: 2020-03-16 | End: 2021-03-08 | Stop reason: SDUPTHER

## 2020-03-30 ENCOUNTER — PATIENT MESSAGE (OUTPATIENT)
Dept: UROLOGY | Facility: CLINIC | Age: 75
End: 2020-03-30

## 2020-03-30 NOTE — TELEPHONE ENCOUNTER
Spoke with patient's daughter Citlali. Virtual visit scheduled for tomorrow at 2pm with . Explained to daughter already have carlos downloaded.

## 2020-03-31 ENCOUNTER — OFFICE VISIT (OUTPATIENT)
Dept: UROLOGY | Facility: CLINIC | Age: 75
End: 2020-03-31
Payer: MEDICARE

## 2020-03-31 ENCOUNTER — TELEPHONE (OUTPATIENT)
Dept: UROLOGY | Facility: CLINIC | Age: 75
End: 2020-03-31

## 2020-03-31 ENCOUNTER — PATIENT MESSAGE (OUTPATIENT)
Dept: UROLOGY | Facility: CLINIC | Age: 75
End: 2020-03-31

## 2020-03-31 DIAGNOSIS — N39.41 URGE INCONTINENCE: ICD-10-CM

## 2020-03-31 DIAGNOSIS — R82.90 ABNORMAL URINE ODOR: Primary | ICD-10-CM

## 2020-03-31 DIAGNOSIS — N28.1 COMPLEX RENAL CYST: ICD-10-CM

## 2020-03-31 DIAGNOSIS — N32.81 OAB (OVERACTIVE BLADDER): ICD-10-CM

## 2020-03-31 DIAGNOSIS — C61 PROSTATE CANCER: ICD-10-CM

## 2020-03-31 LAB
INR PPP: 1.6 (ref 0.8–1.2)
PROTHROMBIN TIME: 19.4 SECOND(S) (ref 12.3–14.7)

## 2020-03-31 PROCEDURE — 1101F PR PT FALLS ASSESS DOC 0-1 FALLS W/OUT INJ PAST YR: ICD-10-PCS | Mod: CPTII,95,S$GLB, | Performed by: UROLOGY

## 2020-03-31 PROCEDURE — 99499 RISK ADDL DX/OHS AUDIT: ICD-10-PCS | Mod: 95,,, | Performed by: UROLOGY

## 2020-03-31 PROCEDURE — 99214 OFFICE O/P EST MOD 30 MIN: CPT | Mod: 95,,, | Performed by: UROLOGY

## 2020-03-31 PROCEDURE — 1101F PT FALLS ASSESS-DOCD LE1/YR: CPT | Mod: CPTII,95,S$GLB, | Performed by: UROLOGY

## 2020-03-31 PROCEDURE — 1159F PR MEDICATION LIST DOCUMENTED IN MEDICAL RECORD: ICD-10-PCS | Mod: 95,S$GLB,, | Performed by: UROLOGY

## 2020-03-31 PROCEDURE — 1159F MED LIST DOCD IN RCRD: CPT | Mod: 95,S$GLB,, | Performed by: UROLOGY

## 2020-03-31 PROCEDURE — 99499 UNLISTED E&M SERVICE: CPT | Mod: 95,,, | Performed by: UROLOGY

## 2020-03-31 PROCEDURE — 99214 PR OFFICE/OUTPT VISIT, EST, LEVL IV, 30-39 MIN: ICD-10-PCS | Mod: 95,,, | Performed by: UROLOGY

## 2020-03-31 NOTE — Clinical Note
Abnormal smelling urineWill need to f/u bmp and urinalysis results ordered by  for abnormal smelling urinary. Unlikely uti as he had similar sx in 2018 except he had dysuria then (none now). If uti present or starts having dysuria and increasing frequency will start antibiotics. F/u in June with diagnostic psa prior for pvr -ordered but needs to be scheduledrbus in December- ordered but needs to be scheduled

## 2020-03-31 NOTE — TELEPHONE ENCOUNTER
----- Message from Adeline Moss MD sent at 3/31/2020  2:22 PM CDT -----  Please obtain labs and [facility] from today ( ordered it)

## 2020-03-31 NOTE — PATIENT INSTRUCTIONS
Overactive bladder and urge incontinence  -continue myrbetriq 50mg once daily, refill given today for 1 year but changed to 3 months supply. Still with some UUI but improved.   -needs to work on voiding every 2 hours: timed voiding (only voiding 4x a day so can explain some of the urge incontinence)    Prostate cancer, Gl 4+4 treated with radiation and ADT  -ADT x 2.5 years (last one was July 2018)  -psa every 6 months now. 0.1 but suspect calbiration issue and was the same as 6 months ago. n ext psa due in June 2020.   -in 2 years (2021) change to yearly FOREVER     hemhorragic renal cyst, RLP complex cyst  - rbus yearly, next due dec 2020. Us 12/2019 only showed 1 of the cyst but us 1 year ago showed 2. Pt has metal in hips and cardiac stents and kd dz so cannot get MRI or CT scan.     Abnormal smelling urine  Will need to f/u bmp and urinalysis results ordered by  for abnormal smelling urinary. Unlikely uti as he had similar sx in 2018 except he had dysuria then (none now). If uti present or starts having dysuria and increasing frequency will start antibiotics.     F/u in June with diagnostic psa prior for pvr -ordered but needs to be scheduled  rbus in December- ordered but needs to be scheduled

## 2020-03-31 NOTE — PROGRESS NOTES
This is a telemedicine visit performed during the COVID 19 isolation    The patient location is: home  The chief complaint leading to consultation is: abnormal smelling urine  Visit type: Virtual visit with synchronous audio and video  Total time spent with patient: 33 minutes  Each patient to whom he or she provides medical services by telemedicine is:  (1) informed of the relationship between the physician and patient and the respective role of any other health care provider with respect to management of the patient; and (2) notified that he or she may decline to receive medical services by telemedicine and may withdraw from such care at any time.  Reason for telemed visit: Covid Concern      Consult Start Time: 03/31/2020 14:08  Consult End Time: 03/31/2020 14:41        [facility] Progress Note - [place]  Urology Group: [facility]/Brijesh/Becca/Galina  PCP: Dr.James newcomb [facility]    Chief Complaint: No chief complaint on file.      Subjective:        HPI: Richard Bustos is a 74 y.o. male presents with       Prostate cancer, Gl 4+4 s/p XRT  completed in 2016+ 2.5 years ADT  Pt was referred to  for Gl 4+4, T2c, N0M0 prostate cancer in June 2016. Decided on radiation with concurrent ADT x 2 years . Underwent gold seed on 8/1/16. Pt completed 7560 Gy, 42 fractions of IRT (9/1/16-10/31/16). first Trelstar given 6/2016, last given 7/26/18.      Right renal hemhorragic cysts rbus 1/22/19 Two complex lesions at the lower pole right kidney.  The larger 4.9cm is unchanged.  The smaller has increased in size, now measuring 3.2 cm. Multiple additional simple renal cysts bilaterally.    -He states he tolerated the radiation but when I saw him in December 2017 he was having Frequency q2-3 hours, UUI that occured 3-4x a daily requiring 1 pad a day. Not on any med. Denies straining. Good stream. I started him on myrbetriq 50mg once a day in 2017 which improved his oab sx   -Pt  "was last seen on 12/5/19- was doing well on myrbetriq 50mg once a day.   psa <0.1 12/2/19 and 6 months ago. Prior to this was 0.01 but may be calibration issue.   -rbus 12/2/19 only showed 5cm RLP complex cyst (previously had 2) likely  dependent. rbus prior to this had shown 4. Plan for repeat rbus for 1 year  .   Interval history:   He did not have f/u planned until June with psa prior, but about 3d ago started having foul smelling urine. Not cloudy. No increasing frequency. Circumcised. No gross hematuria. Still smells abnormal- "acidy urine smell". (in 4/2018 had a "strong smell" and cx was normal).  No change in diet. Last uti was years ago and the sx included dysuria, malodoros urine.    Also retaining some fluid.   Has appt with  4/6    Still on myrbetriq 50mg once daily. Still wearing depends - 1 a day and leaks with drips UUI 1x a day. Voids 4x a day.     psa hx   12/2/19 <0.1  5/10/19 <0.1  4/24/19 <0.01  4/23/19 0.10  1/15/19 <0.01  10/16/18 <0.01  7/26/18 trelstar 22.5mg IM in right gluteus  7/5/18  <0.01, T 11  1/12/18 Bone density scan normal.   1/4/18  <0.01, T 14 - administered trelstar, started Fosamax  6/12/17 <0.01, T 13 - administered trelstar, started ca/vitD  12/12/16 <0.01 - adminstered trelstar  10/31/16 Completed radiation   8/1/16  3.3 - underwent goldseed  6/2016  trelstar  5/23/16 trus bx: Gl 4+4, volume 49.6g. Bone scan and CT scan negative  3/18/16 7.7  2/3/15   6.1  8/22/14  5.3  9/26/11  3.9  8/26/10  3.0    ua today: No urine sample provided due to telemedicine visit- [facility]  Urine history:  1/3/20  1+protein  12/5/19 100 protein/tr bld  10/18/19 1+protein  9/30/19 Tr prot/tr bld  7/2/19  1+prot/tr bld  5/14/19 100 prot  4/5/19  1+prot  3/29/19 2_prot/tr bld  7/2018  No cx, void: 2+protein   4/12/18 Ng, void: 2+prot/tr blood- no dysuria "smell strong"  3/18/16 Cytology: neg  11/4/14 Ng, cytology: neg       REVIEW OF SYSTEMS:  General ROS: no fevers, no " chills  Psychological ROS: no depression  Endocrine ROS: no heat or cold  Respiratory ROS: no SOB  Cardiovascular ROS: no CP, + easy bruising  Gastrointestinal ROS: no abdominal pain, no constipation, no diarrhea, no BRBPR  Musculoskeletal ROS: no muscle pain  Neurological ROS: no headaches  Dermatological ROS: no rashes  HEENT: +glasses, no sinus   ROS: per HPI    Past Medical History:   Diagnosis Date    Anemia     Anemia of other chronic disease 9/13/2017    Anemia, chronic renal failure 9/13/2017    Anemia, unspecified 9/13/2017    Anticoagulant long-term use     Aorta aneurysm     Arthritis     Atrial fibrillation     CAD (coronary artery disease)     CHF (congestive heart failure)     Chronic kidney disease     Clotting disorder 9/13/2017    Colon polyp     Dementia     Diabetes mellitus     not on meds    Diabetes mellitus type II     Diverticulosis     Elevated PSA     Encounter for blood transfusion     Former smoker     General anesthetics causing adverse effect in therapeutic use     TROUBLE COMING OUT OF ANESTHESIA WITH GASTRIC BYPASS    GERD (gastroesophageal reflux disease)     Gout     Hx of colonic polyps     Hypercoagulable state     Hyperlipidemia     Hypertension     MI, old 2010    MTHFR mutation     Myocardial infarction     9/2010    Osteomyelitis of ankle or foot 3/9/2017    Prostate cancer 06/2016    Sleep apnea     Squamous cell carcinoma 01/23/2018    Left helix, imiquimod    Venous stasis     Chronic     Past Surgical History:   Procedure Laterality Date    ABDOMINAL SURGERY      CARDIAC SURGERY      3 STENTS     COLONOSCOPY  02/2011    diverticulosis with diverticula with clot, no active bleeding    COLONOSCOPY N/A 8/24/2016    Procedure: COLONOSCOPY;  Surgeon: Saroj Borjas MD;  Location: [facility];  Service: Endoscopy;  Laterality: N/A;    GASTRIC BYPASS  2/5/2008    IVC FILTER RETRIEVAL      JOINT REPLACEMENT  1996 and 2001    bi-lat hip  replacement/Rt Hip and Lt Hip    RADIOFREQUENCY ABLATION OF LUMBAR MEDIAL BRANCH NERVE AT SINGLE LEVEL Bilateral 1/10/2020    Procedure: Radiofrequency Ablation, Nerve, Spinal, Lumbar, Medial Branch, 1 Level;  Surgeon: Evangelist Bose MD;  Location: [facility] OR;  Service: Pain Management;  Laterality: Bilateral;  L3,L4,L5    ROTATOR CUFF REPAIR      Rt shoulder    Stents  8/18/2010    x 3    UPPER GASTROINTESTINAL ENDOSCOPY  02/2011     Social and family hx reviewed      There have not been any changes.    Cataracts? removed    Urologic meds: myrbetriq 50mg daily   Anticoagulation: Yes - coumadin for hypercoagulable state and plavix    Objective:     There were no vitals filed for this visit.           No vitals or exam performed due to virtual visit (COVID)      Labs:  Recent Labs   Lab 10/20/19  0414 10/21/19  0442 02/13/20  0913   WBC 7.10 5.60 7.00   Hemoglobin 9.6 L 8.8 L 11.4 L   Hematocrit 29.4 L 26.4 L 33.7 L   Platelets 168 165 181   ]  Recent Labs   Lab 10/22/18  0951  07/02/19  1410  09/30/19  0955 10/17/19  1055 10/18/19  0720  11/13/19  1020 01/03/20  1551 02/13/20  0913   Sodium 141   < > 143   < > 140 140 140   < > 140  140 142 144   Potassium 4.8   < > 5.1   < > 5.0 5.2 H 5.7 H   < > 5.1  5.1 4.9 5.0   Chloride 107   < > 112 H   < > 108 114 H 114 H   < > 106  106 110 112 H   CO2 28   < > 25   < > 27 22 L 21 L   < > 26  26 23 23   BUN, Bld 25 H   < > 39 H   < > 35 H 47 H 50 H   < > 32 H  32 H 34 H 36 H   Creatinine 1.4   < > 1.8 H   < > 1.6 H 1.9 H 2.0 H   < > 1.6 H  1.6 H 1.6 H 1.9 H   Glucose 139 H   < > 96   < > 84 236 H 128 H   < > 79  79 101 120 H   Calcium 8.7   < > 9.4   < > 9.0 9.0 8.5 L   < > 9.0  9.0 9.0 9.3   Magnesium 1.9  --  1.8  --  1.9  --   --   --   --   --   --    Phosphorus 3.3  3.3   < > 3.5  3.5  --  3.3  3.3  --   --   --   --  3.3  --    Alkaline Phosphatase  --    < >  --    < >  --  95 102  --   --   --  80   Total Protein  --    < >  --    < >  --  6.9 6.9  --    --   --  6.8   Albumin 3.0 L   < > 3.3 L   < > 3.4 L 3.1 L 3.2 L  --   --  3.2 L 3.2 L   Total Bilirubin  --    < >  --    < >  --  0.4 0.4  --   --   --  0.6   AST  --    < >  --    < >  --  27 30  --   --   --  25   ALT  --    < >  --    < >  --  29 28  --   --   --  20    < > = values in this interval not displayed.   ]    Lab Results   Component Value Date    LABA1C 6.6 (H) 08/08/2016    HGBA1C 6.0 (H) 02/13/2020       Path: see hpi     LEFT    RIGHT  BASE   3+3 (1/2),  4+3 (1/2)     MID    4+4 (1/2)    b9  APEX    b9      b9   Date of Biopsy: 5/23/16  PSA: 7.7  Volume: 49.6  Prostate nodule: no    Rads: see hpi  rbus 12/2/19  Right kidney: The right kidney measures 16.4 cm. Mild cortical thinning is noted.  No loss of corticomedullary distinction. Resistive index measures 0.81.  Persistent renal cysts are seen throughout the kidney.  The largest in the upper pole measures 5.8 x 5.6 cm.  There is a lower pole complex cyst with internal echoes measuring 4.7 x 5.0 cm.  These are not adversely changed.  No abnormal calcifications no hydronephrosis.    Left kidney: The left kidney measures 13.7 cm. Mild cortical thinning.  No loss of corticomedullary distinction. Resistive index measures 0.79.  Multiple renal cysts are again identified.  The largest measures 6.2 x 4.3 x 6.6 cm in the midpole.  There is an upper pole 3.2 cm cyst in the lower pole 3.0 cm cyst.  These are simple in nature.  No solid mass.  No renal stone. No hydronephrosis.    The bladder is partially distended at the time of scanning and has an unremarkable appearance.    rbus 1/22/19  1. Sequela of chronic medical renal disease.  2. Two complex lesions at the lower pole right kidney.  The larger 4.9cm is unchanged.  The smaller has increased in size, now measuring 3.2 cm.  3. Multiple additional simple renal cysts bilaterally.  4. Cholelithiasis.    DEXA bone density done for ADT 1/12/18: Normal bone mineral density of the of the lumbar  spine.    1/18/17 rbus  Multiple bilateral renal cysts  4 right renal hemhorragic cysts - 1.8 cm, 6.8 cm, 4.5 cm and 1.6     6/14/16   ctap   Bilateral renal masses  No significant adenopathy  8cm complex fluid collection    Bone scan 6/14/16  Negative    Assessment:       1. Abnormal urine odor    2. Prostate cancer    3. OAB (overactive bladder)    4. Urge incontinence    5. Complex renal cyst        Plan:     Overactive bladder and urge incontinence  -continue myrbetriq 50mg once daily, refill given today for 1 year but changed to 3 months supply. Still with some UUI but improved.   -needs to work on voiding every 2 hours: timed voiding (only voiding 4x a day so can explain some of the urge incontinence)    Prostate cancer, Gl 4+4 treated with radiation and ADT  -ADT x 2.5 years (last one was July 2018)  -psa every 6 months now. 0.1 but suspect calbiration issue and was the same as 6 months ago. n ext psa due in June 2020.   -in 2 years (2021) change to yearly FOREVER     hemhorragic renal cyst, RLP complex cyst  - rbus yearly, next due dec 2020. Us 12/2019 only showed 1 of the cyst but us 1 year ago showed 2. Pt has metal in hips and cardiac stents and kd dz so cannot get MRI or CT scan.     Abnormal smelling urine  Will need to f/u bmp and urinalysis results ordered by  for abnormal smelling urinary. Unlikely uti as he had similar sx in 2018 except he had dysuria then (none now). If uti present or starts having dysuria and increasing frequency will start antibiotics.     F/u in June with diagnostic psa prior for pvr -ordered but needs to be scheduled  rbus in December- ordered but needs to be scheduled    Route  and

## 2020-04-07 ENCOUNTER — PATIENT MESSAGE (OUTPATIENT)
Dept: UROLOGY | Facility: CLINIC | Age: 75
End: 2020-04-07

## 2020-04-07 NOTE — TELEPHONE ENCOUNTER
Urinaylsis results:   Yellow  Clear  1.015-SP  5.0-PH  Negative-Glucose  Negative-Bilirubin  Negative-Ketones  Negative-blood  2+-protein  Negative-Nitrate  Negative-Leukocytes    No culture

## 2020-04-10 ENCOUNTER — PATIENT MESSAGE (OUTPATIENT)
Dept: FAMILY MEDICINE | Facility: CLINIC | Age: 75
End: 2020-04-10

## 2020-04-10 DIAGNOSIS — M10.9 ACUTE GOUT OF FOOT, UNSPECIFIED CAUSE, UNSPECIFIED LATERALITY: ICD-10-CM

## 2020-04-11 RX ORDER — ALLOPURINOL 100 MG/1
TABLET ORAL
Qty: 90 TABLET | Refills: 3 | Status: SHIPPED | OUTPATIENT
Start: 2020-04-11 | End: 2021-03-08 | Stop reason: SDUPTHER

## 2020-04-13 RX ORDER — FAMOTIDINE 40 MG/1
40 TABLET, FILM COATED ORAL DAILY
Qty: 30 TABLET | Refills: 11 | Status: SHIPPED | OUTPATIENT
Start: 2020-04-13 | End: 2021-03-08 | Stop reason: SDUPTHER

## 2020-04-13 NOTE — TELEPHONE ENCOUNTER
Per portal message, patient's daughter was advised by pharmacist that ranitidine was taken off the market and looking for substitute. Please advise.

## 2020-04-15 ENCOUNTER — PATIENT MESSAGE (OUTPATIENT)
Dept: FAMILY MEDICINE | Facility: CLINIC | Age: 75
End: 2020-04-15

## 2020-04-15 NOTE — TELEPHONE ENCOUNTER
The only other non narcotic pain relievers are anti inflammatories, such as ibuprofen, naproxen, etc. However, these should not be given in patients with decreased kidney function as it can cause further kidney damage. This patient already has significant decrease in kidney function, so I would not recommend that. Tylenol is the only option at this time.  If he continues to have significant back pain, we can have him seen at the back and spine clinic when normal operations resume.

## 2020-04-18 ENCOUNTER — PATIENT MESSAGE (OUTPATIENT)
Dept: FAMILY MEDICINE | Facility: CLINIC | Age: 75
End: 2020-04-18

## 2020-04-22 ENCOUNTER — TELEPHONE (OUTPATIENT)
Dept: PAIN MEDICINE | Facility: CLINIC | Age: 75
End: 2020-04-22

## 2020-04-22 ENCOUNTER — PATIENT MESSAGE (OUTPATIENT)
Dept: PAIN MEDICINE | Facility: CLINIC | Age: 75
End: 2020-04-22

## 2020-04-22 DIAGNOSIS — M51.36 DEGENERATIVE DISC DISEASE, LUMBAR: ICD-10-CM

## 2020-04-22 DIAGNOSIS — M47.896 OTHER SPONDYLOSIS, LUMBAR REGION: Primary | ICD-10-CM

## 2020-04-22 RX ORDER — TRAMADOL HYDROCHLORIDE 50 MG/1
50 TABLET ORAL EVERY 6 HOURS PRN
Qty: 56 TABLET | Refills: 0 | Status: SHIPPED | OUTPATIENT
Start: 2020-04-22 | End: 2020-05-06

## 2020-04-22 NOTE — TELEPHONE ENCOUNTER
----- Message from Evangelist Bose MD sent at 4/22/2020  9:52 AM CDT -----  Please inform the patient that it is likely too soon to repeat his RFA (has yet to be 6 months since his last RFA). I would like the patient to get up to date imaging of his lumbar spine to get a better idea of what the next best procedure would be for Mr. Bsutos.     I will place a MRI of the lumbar spine order in for the patient and prescribe him tramadol for his pain temporarily.

## 2020-04-22 NOTE — TELEPHONE ENCOUNTER
Spoke to pt informed verbalizes understanding. Pt wanting to verify if ok to take Tramadol with his kidney function. Please advise

## 2020-05-01 ENCOUNTER — OFFICE VISIT (OUTPATIENT)
Dept: PODIATRY | Facility: CLINIC | Age: 75
End: 2020-05-01
Payer: MEDICARE

## 2020-05-01 VITALS
TEMPERATURE: 99 F | SYSTOLIC BLOOD PRESSURE: 144 MMHG | HEART RATE: 54 BPM | DIASTOLIC BLOOD PRESSURE: 66 MMHG | HEIGHT: 70 IN | WEIGHT: 286.63 LBS | BODY MASS INDEX: 41.03 KG/M2

## 2020-05-01 DIAGNOSIS — L84 CORN OR CALLUS: ICD-10-CM

## 2020-05-01 DIAGNOSIS — Z87.898 HISTORY OF ULCERATION: ICD-10-CM

## 2020-05-01 DIAGNOSIS — Z79.01 LONG TERM (CURRENT) USE OF ANTICOAGULANTS: ICD-10-CM

## 2020-05-01 DIAGNOSIS — E11.49 TYPE II DIABETES MELLITUS WITH NEUROLOGICAL MANIFESTATIONS: Primary | ICD-10-CM

## 2020-05-01 DIAGNOSIS — B35.1 ONYCHOMYCOSIS DUE TO DERMATOPHYTE: ICD-10-CM

## 2020-05-01 PROCEDURE — 11056 PARNG/CUTG B9 HYPRKR LES 2-4: CPT | Mod: Q9,S$GLB,, | Performed by: PODIATRIST

## 2020-05-01 PROCEDURE — 99999 PR PBB SHADOW E&M-EST. PATIENT-LVL V: ICD-10-PCS | Mod: PBBFAC,,, | Performed by: PODIATRIST

## 2020-05-01 PROCEDURE — 3078F PR MOST RECENT DIASTOLIC BLOOD PRESSURE < 80 MM HG: ICD-10-PCS | Mod: CPTII,S$GLB,, | Performed by: PODIATRIST

## 2020-05-01 PROCEDURE — 1100F PTFALLS ASSESS-DOCD GE2>/YR: CPT | Mod: CPTII,S$GLB,, | Performed by: PODIATRIST

## 2020-05-01 PROCEDURE — 11721 DEBRIDE NAIL 6 OR MORE: CPT | Mod: 59,Q9,S$GLB, | Performed by: PODIATRIST

## 2020-05-01 PROCEDURE — 3078F DIAST BP <80 MM HG: CPT | Mod: CPTII,S$GLB,, | Performed by: PODIATRIST

## 2020-05-01 PROCEDURE — 1159F PR MEDICATION LIST DOCUMENTED IN MEDICAL RECORD: ICD-10-PCS | Mod: S$GLB,,, | Performed by: PODIATRIST

## 2020-05-01 PROCEDURE — 3077F SYST BP >= 140 MM HG: CPT | Mod: CPTII,S$GLB,, | Performed by: PODIATRIST

## 2020-05-01 PROCEDURE — 99999 PR PBB SHADOW E&M-EST. PATIENT-LVL V: CPT | Mod: PBBFAC,,, | Performed by: PODIATRIST

## 2020-05-01 PROCEDURE — 1125F AMNT PAIN NOTED PAIN PRSNT: CPT | Mod: S$GLB,,, | Performed by: PODIATRIST

## 2020-05-01 PROCEDURE — 3077F PR MOST RECENT SYSTOLIC BLOOD PRESSURE >= 140 MM HG: ICD-10-PCS | Mod: CPTII,S$GLB,, | Performed by: PODIATRIST

## 2020-05-01 PROCEDURE — 99213 PR OFFICE/OUTPT VISIT, EST, LEVL III, 20-29 MIN: ICD-10-PCS | Mod: 25,S$GLB,, | Performed by: PODIATRIST

## 2020-05-01 PROCEDURE — 1100F PR PT FALLS ASSESS DOC 2+ FALLS/FALL W/INJURY/YR: ICD-10-PCS | Mod: CPTII,S$GLB,, | Performed by: PODIATRIST

## 2020-05-01 PROCEDURE — 3288F FALL RISK ASSESSMENT DOCD: CPT | Mod: CPTII,S$GLB,, | Performed by: PODIATRIST

## 2020-05-01 PROCEDURE — 11721 PR DEBRIDEMENT OF NAILS, 6 OR MORE: ICD-10-PCS | Mod: 59,Q9,S$GLB, | Performed by: PODIATRIST

## 2020-05-01 PROCEDURE — 99499 RISK ADDL DX/OHS AUDIT: ICD-10-PCS | Mod: S$GLB,,, | Performed by: PODIATRIST

## 2020-05-01 PROCEDURE — 1159F MED LIST DOCD IN RCRD: CPT | Mod: S$GLB,,, | Performed by: PODIATRIST

## 2020-05-01 PROCEDURE — 99213 OFFICE O/P EST LOW 20 MIN: CPT | Mod: 25,S$GLB,, | Performed by: PODIATRIST

## 2020-05-01 PROCEDURE — 3044F HG A1C LEVEL LT 7.0%: CPT | Mod: CPTII,S$GLB,, | Performed by: PODIATRIST

## 2020-05-01 PROCEDURE — 11056 PR TRIM BENIGN HYPERKERATOTIC SKIN LESION,2-4: ICD-10-PCS | Mod: Q9,S$GLB,, | Performed by: PODIATRIST

## 2020-05-01 PROCEDURE — 99499 UNLISTED E&M SERVICE: CPT | Mod: S$GLB,,, | Performed by: PODIATRIST

## 2020-05-01 PROCEDURE — 1125F PR PAIN SEVERITY QUANTIFIED, PAIN PRESENT: ICD-10-PCS | Mod: S$GLB,,, | Performed by: PODIATRIST

## 2020-05-01 PROCEDURE — 3044F PR MOST RECENT HEMOGLOBIN A1C LEVEL <7.0%: ICD-10-PCS | Mod: CPTII,S$GLB,, | Performed by: PODIATRIST

## 2020-05-01 PROCEDURE — 3288F PR FALLS RISK ASSESSMENT DOCUMENTED: ICD-10-PCS | Mod: CPTII,S$GLB,, | Performed by: PODIATRIST

## 2020-05-01 RX ORDER — AMMONIUM LACTATE 12 G/100G
CREAM TOPICAL
Qty: 140 G | Refills: 11 | Status: ON HOLD | OUTPATIENT
Start: 2020-05-01 | End: 2020-11-17

## 2020-05-01 RX ORDER — GABAPENTIN 100 MG/1
CAPSULE ORAL
COMMUNITY
Start: 2020-02-26 | End: 2020-05-20

## 2020-05-01 RX ORDER — SERTRALINE HYDROCHLORIDE 100 MG/1
TABLET, FILM COATED ORAL
COMMUNITY
Start: 2020-03-11 | End: 2020-05-20

## 2020-05-01 RX ORDER — CICLOPIROX 80 MG/ML
SOLUTION TOPICAL NIGHTLY
Qty: 6.6 ML | Refills: 11 | Status: ON HOLD | OUTPATIENT
Start: 2020-05-01 | End: 2020-11-17

## 2020-05-01 NOTE — PROGRESS NOTES
Subjective:      Patient ID: Richard Bustos is a 74 y.o. male.    Chief Complaint: Diabetes Mellitus (PCP Familia Ramirez jr. last seen 2/20/20. A1c 5.6 8/16/19.)    Richard is a 74 y.o. male who presents to the clinic for evaluation and treatment of high risk feet. Richard has a past medical history of Anemia, Anemia of other chronic disease (9/13/2017), Anemia, chronic renal failure (9/13/2017), Anemia, unspecified (9/13/2017), Anticoagulant long-term use, Aorta aneurysm, Arthritis, Atrial fibrillation, CAD (coronary artery disease), CHF (congestive heart failure), Chronic kidney disease, Clotting disorder (9/13/2017), Colon polyp, Dementia, Diabetes mellitus, Diabetes mellitus type II, Diverticulosis, Elevated PSA, Encounter for blood transfusion, Former smoker, General anesthetics causing adverse effect in therapeutic use, GERD (gastroesophageal reflux disease), Gout, colonic polyps, Hypercoagulable state, Hyperlipidemia, Hypertension, MI, old (2010), MTHFR mutation, Myocardial infarction, Osteomyelitis of ankle or foot (3/9/2017), Prostate cancer (06/2016), Sleep apnea, Squamous cell carcinoma (01/23/2018), and Venous stasis. The patient's chief complaint is pain and bump under right big toe area and right small toe area on side.  Gradual onset, worsening over past several weeks, aggravated by increased weight bearing, shoe gear, pressure.  No previous medical treatment.  No self treatment.    CC2 thick long discolored misshapen toenails all toes.  Gradual onset, worsening over past several weeks, aggravated by increased weight bearing, shoe gear, pressure.  Periodic debridement helps symptoms.    PCP: Familia Ramirez Jr, MD    Date Last Seen by PCP:   Chief Complaint   Patient presents with    Diabetes Mellitus     PCP Familia Ramirez jr. last seen 2/20/20. A1c 5.6 8/16/19.         Current shoe gear:  Affected Foot: sx shoe right     Unaffected Foot: Rx diabetic extra depth shoes and custom accommodative  insoles    History of Trauma: negative  Sign of Infection: none    Hemoglobin A1C   Date Value Ref Range Status   02/13/2020 6.0 (H) 4.0 - 5.6 % Final     Comment:     ADA Screening Guidelines:  5.7-6.4%  Consistent with prediabetes  >or=6.5%  Consistent with diabetes  High levels of fetal hemoglobin interfere with the HbA1C  assay. Heterozygous hemoglobin variants (HbS, HgC, etc)do  not significantly interfere with this assay.   However, presence of multiple variants may affect accuracy.     08/16/2019 5.6 4.0 - 5.6 % Final     Comment:     ADA Screening Guidelines:  5.7-6.4%  Consistent with prediabetes  >or=6.5%  Consistent with diabetes  High levels of fetal hemoglobin interfere with the HbA1C  assay. Heterozygous hemoglobin variants (HbS, HgC, etc)do  not significantly interfere with this assay.   However, presence of multiple variants may affect accuracy.     02/11/2019 6.2 (H) 4.0 - 5.6 % Final     Comment:     ADA Screening Guidelines:  5.7-6.4%  Consistent with prediabetes  >or=6.5%  Consistent with diabetes  High levels of fetal hemoglobin interfere with the HbA1C  assay. Heterozygous hemoglobin variants (HbS, HgC, etc)do  not significantly interfere with this assay.   However, presence of multiple variants may affect accuracy.     06/17/2013 6.8 (H) 4.8 - 5.6 % Final     Comment:              Increased risk for diabetes: 5.7 - 6.4           Diabetes: >6.4           Glycemic control for adults with diabetes: <7.0  **In order to standardize %HbA1c results worldwide, as of October 11, 2010,  the %HbA1c is being calculated using the master equation recommended in the  consensus statement adopted by the ADA (American Diabetes Assoc), EASD  (European Assoc for the Study of Diabetes), IFCC (International Federation  of Clinical Chemistry and Laboratory Medicine) and IDF (International  Diabetes Federation). Result units: %HgbA1c (DCCT/NGSP).  In common with other methods, Hb A1C values may not accurately reflect  mean  blood glucose in patients with hemoglobin variants (HgbF, HgbS and HgbC).  Any cause of shortened erythrocyte survival will reduce exposure of  erythrocytes to glucose with a consequent decrease in HbA1c (%) values, even  though the time-averaged blood glucose level may be elevated. Causes of  shortened erythrocyte lifetime might be hemolytic anemia or other hemolytic  diseases, homozygous sickle cell trait, pregnancy, recent significant or  chronic blood loss, etc. Caution should be used when interpreting the HbA1c  results from patients with these conditions.       Review of Systems   Constitution: Negative for chills, diaphoresis, fever, malaise/fatigue and night sweats.   Cardiovascular: Positive for leg swelling. Negative for claudication, cyanosis and syncope.   Skin: Positive for nail changes, poor wound healing and suspicious lesions. Negative for color change, dry skin, rash and unusual hair distribution.   Musculoskeletal: Negative for falls, joint pain, joint swelling, muscle cramps, muscle weakness and stiffness.   Gastrointestinal: Negative for constipation, diarrhea, nausea and vomiting.   Neurological: Positive for numbness, paresthesias and sensory change. Negative for brief paralysis, disturbances in coordination, focal weakness and tremors.           Objective:      Physical Exam   Constitutional: He appears well-developed and well-nourished. He is cooperative. No distress.   Cardiovascular:   Pulses:       Dorsalis pedis pulses are 1+ on the right side, and 1+ on the left side.        Posterior tibial pulses are 1+ on the right side, and 1+ on the left side.   Toes warm, pink, cap fill <5 seconds.    <2+ pitting edema bilateral leg/ankle foot.   Musculoskeletal:   Normal angle, base, station of gait.  Unsteady gait with cane as assist.   All ten toes without clubbing, cyanosis, or signs of ischemia.  No pain to palpation bilateral lower extremities.  Range of motion, stability, muscle  strength, and muscle tone normal bilateral feet and legs.    Lymphadenopathy:   Negative lymphadenopathy bilateral popliteal fossa and tarsal tunnel.     Neurological: A sensory deficit is present.   Diminished/loss of protective sensation all toes bilateral to 10 gram monofilament.    Paresthesias, bilateral feet with no clearly identified trigger or source.     Skin: Skin is warm and dry. No abrasion, no bruising, no burn, no ecchymosis, no laceration, no lesion and no rash noted. He is not diaphoretic. No erythema. No pallor.     Toenails 1st, 2nd, 3rd, 4th, 5th  bilateral are hypertrophic thickened 2-3 mm, dystrophic, discolored tanish brown with tan, gray crumbly subungual debris.  Tender to distal nail plate pressure, without periungual skin abnormality of each.    Focal hyperkeratotic lesion consisting entirely of hyperkeratotic tissue without open skin, drainage, pus, fluctuance, malodor, or signs of infection plantar right 1st mtpj and lateral right 5th mtpj.    Otherwise, Skin thin, shiny, atrophic, with decreased density and distribution of pedal hair bilateral, but without hyperpigmentation, minerva discoloration,  ulcers, masses, nodules or cords palpated bilateral feet and legs.                  Assessment:       Encounter Diagnoses   Name Primary?    Type II diabetes mellitus with neurological manifestations Yes    Long term (current) use of anticoagulants     Corn or callus     History of ulceration     Onychomycosis due to dermatophyte          Plan:       Richard was seen today for diabetes mellitus.    Diagnoses and all orders for this visit:    Type II diabetes mellitus with neurological manifestations    Long term (current) use of anticoagulants    Corn or callus    History of ulceration    Onychomycosis due to dermatophyte      I counseled the patient on his conditions, their implications and medical management.        The patient has received literature on basic diabetic foot care.  Patient  will inspect feet daily, wear protective shoe gear when ambulatory, and apply moisturizer to skin as needed to maintain elasticity and help prevent ulceration.    Continue penlac, lac hydrin, penlac - refilled.    Continue new DM shoes/inserts all times WB.    With the patient's permission, I debrided all ten toenails with a sterile nipper and curette, removing all offending nail and debris.  Patient tolerated the procedure well and related significant relief.      With the patient's permission, I debrided hyperkeratotic lesion(s) as above totaling      2        to, not  Including dermis with sterile #15 blade.  Patient tolerated the procedure well and related significant relief.           Follow up in about 1 year (around 5/1/2021).

## 2020-05-04 ENCOUNTER — PATIENT MESSAGE (OUTPATIENT)
Dept: PAIN MEDICINE | Facility: CLINIC | Age: 75
End: 2020-05-04

## 2020-05-04 DIAGNOSIS — F40.240 CLAUSTROPHOBIA: Primary | ICD-10-CM

## 2020-05-05 ENCOUNTER — PATIENT MESSAGE (OUTPATIENT)
Dept: ADMINISTRATIVE | Facility: HOSPITAL | Age: 75
End: 2020-05-05

## 2020-05-13 ENCOUNTER — TELEPHONE (OUTPATIENT)
Dept: PAIN MEDICINE | Facility: CLINIC | Age: 75
End: 2020-05-13

## 2020-05-13 RX ORDER — LORAZEPAM 2 MG/1
2 TABLET ORAL
Qty: 1 TABLET | Refills: 0 | Status: ON HOLD | OUTPATIENT
Start: 2020-05-13 | End: 2020-11-17

## 2020-05-13 NOTE — TELEPHONE ENCOUNTER
----- Message from Evangelist Bose MD sent at 5/13/2020  7:24 AM CDT -----  One tablet of ativan sent to the patient's pharmacy for anxiety in regards to his MRI. The patient can be scheduled to see me after his MRI.

## 2020-05-20 ENCOUNTER — OFFICE VISIT (OUTPATIENT)
Dept: FAMILY MEDICINE | Facility: CLINIC | Age: 75
End: 2020-05-20
Payer: MEDICARE

## 2020-05-20 ENCOUNTER — LAB VISIT (OUTPATIENT)
Dept: LAB | Facility: HOSPITAL | Age: 75
End: 2020-05-20
Attending: PHYSICIAN ASSISTANT
Payer: MEDICARE

## 2020-05-20 VITALS
HEART RATE: 61 BPM | SYSTOLIC BLOOD PRESSURE: 112 MMHG | WEIGHT: 289.25 LBS | DIASTOLIC BLOOD PRESSURE: 60 MMHG | OXYGEN SATURATION: 97 % | TEMPERATURE: 96 F | BODY MASS INDEX: 41.5 KG/M2

## 2020-05-20 DIAGNOSIS — R55 NEAR SYNCOPE: ICD-10-CM

## 2020-05-20 DIAGNOSIS — I48.0 PAF (PAROXYSMAL ATRIAL FIBRILLATION): ICD-10-CM

## 2020-05-20 DIAGNOSIS — M54.9 CHRONIC BILATERAL BACK PAIN, UNSPECIFIED BACK LOCATION: ICD-10-CM

## 2020-05-20 DIAGNOSIS — K21.9 GASTROESOPHAGEAL REFLUX DISEASE WITHOUT ESOPHAGITIS: ICD-10-CM

## 2020-05-20 DIAGNOSIS — N18.30 TYPE 2 DIABETES MELLITUS WITH STAGE 3 CHRONIC KIDNEY DISEASE, WITHOUT LONG-TERM CURRENT USE OF INSULIN: ICD-10-CM

## 2020-05-20 DIAGNOSIS — G89.29 CHRONIC BILATERAL BACK PAIN, UNSPECIFIED BACK LOCATION: ICD-10-CM

## 2020-05-20 DIAGNOSIS — E11.22 TYPE 2 DIABETES MELLITUS WITH STAGE 3 CHRONIC KIDNEY DISEASE, WITHOUT LONG-TERM CURRENT USE OF INSULIN: ICD-10-CM

## 2020-05-20 DIAGNOSIS — G47.33 OBSTRUCTIVE SLEEP APNEA SYNDROME: ICD-10-CM

## 2020-05-20 DIAGNOSIS — E11.59 HYPERTENSION ASSOCIATED WITH DIABETES: ICD-10-CM

## 2020-05-20 DIAGNOSIS — R55 NEAR SYNCOPE: Primary | ICD-10-CM

## 2020-05-20 DIAGNOSIS — I15.2 HYPERTENSION ASSOCIATED WITH DIABETES: ICD-10-CM

## 2020-05-20 DIAGNOSIS — I10 ESSENTIAL HYPERTENSION: ICD-10-CM

## 2020-05-20 LAB
ALBUMIN SERPL BCP-MCNC: 3.1 G/DL (ref 3.5–5.2)
ALP SERPL-CCNC: 93 U/L (ref 55–135)
ALT SERPL W/O P-5'-P-CCNC: 18 U/L (ref 10–44)
ANION GAP SERPL CALC-SCNC: 9 MMOL/L (ref 8–16)
AST SERPL-CCNC: 23 U/L (ref 10–40)
BASOPHILS # BLD AUTO: 0.03 K/UL (ref 0–0.2)
BASOPHILS NFR BLD: 0.4 % (ref 0–1.9)
BILIRUB SERPL-MCNC: 0.4 MG/DL (ref 0.1–1)
BUN SERPL-MCNC: 44 MG/DL (ref 8–23)
CALCIUM SERPL-MCNC: 9.5 MG/DL (ref 8.7–10.5)
CHLORIDE SERPL-SCNC: 115 MMOL/L (ref 95–110)
CO2 SERPL-SCNC: 17 MMOL/L (ref 23–29)
CREAT SERPL-MCNC: 1.9 MG/DL (ref 0.5–1.4)
DIFFERENTIAL METHOD: ABNORMAL
EOSINOPHIL # BLD AUTO: 0.2 K/UL (ref 0–0.5)
EOSINOPHIL NFR BLD: 2 % (ref 0–8)
ERYTHROCYTE [DISTWIDTH] IN BLOOD BY AUTOMATED COUNT: 13.6 % (ref 11.5–14.5)
EST. GFR  (AFRICAN AMERICAN): 39.8 ML/MIN/1.73 M^2
EST. GFR  (NON AFRICAN AMERICAN): 34.5 ML/MIN/1.73 M^2
GLUCOSE SERPL-MCNC: 150 MG/DL (ref 70–110)
HCT VFR BLD AUTO: 34.5 % (ref 40–54)
HGB BLD-MCNC: 10.9 G/DL (ref 14–18)
IMM GRANULOCYTES # BLD AUTO: 0.04 K/UL (ref 0–0.04)
IMM GRANULOCYTES NFR BLD AUTO: 0.5 % (ref 0–0.5)
LYMPHOCYTES # BLD AUTO: 1.4 K/UL (ref 1–4.8)
LYMPHOCYTES NFR BLD: 17.2 % (ref 18–48)
MCH RBC QN AUTO: 32.9 PG (ref 27–31)
MCHC RBC AUTO-ENTMCNC: 31.6 G/DL (ref 32–36)
MCV RBC AUTO: 104 FL (ref 82–98)
MONOCYTES # BLD AUTO: 0.6 K/UL (ref 0.3–1)
MONOCYTES NFR BLD: 7.6 % (ref 4–15)
NEUTROPHILS # BLD AUTO: 5.8 K/UL (ref 1.8–7.7)
NEUTROPHILS NFR BLD: 72.3 % (ref 38–73)
NRBC BLD-RTO: 0 /100 WBC
PLATELET # BLD AUTO: 216 K/UL (ref 150–350)
PMV BLD AUTO: 11 FL (ref 9.2–12.9)
POTASSIUM SERPL-SCNC: 5.4 MMOL/L (ref 3.5–5.1)
PROT SERPL-MCNC: 6.9 G/DL (ref 6–8.4)
RBC # BLD AUTO: 3.31 M/UL (ref 4.6–6.2)
SODIUM SERPL-SCNC: 141 MMOL/L (ref 136–145)
T4 FREE SERPL-MCNC: 0.83 NG/DL (ref 0.71–1.51)
TSH SERPL DL<=0.005 MIU/L-ACNC: 1.04 UIU/ML (ref 0.4–4)
WBC # BLD AUTO: 8.02 K/UL (ref 3.9–12.7)

## 2020-05-20 PROCEDURE — 3044F HG A1C LEVEL LT 7.0%: CPT | Mod: CPTII,S$GLB,, | Performed by: PHYSICIAN ASSISTANT

## 2020-05-20 PROCEDURE — 85025 COMPLETE CBC W/AUTO DIFF WBC: CPT

## 2020-05-20 PROCEDURE — 3074F SYST BP LT 130 MM HG: CPT | Mod: CPTII,S$GLB,, | Performed by: PHYSICIAN ASSISTANT

## 2020-05-20 PROCEDURE — 80053 COMPREHEN METABOLIC PANEL: CPT

## 2020-05-20 PROCEDURE — 99999 PR PBB SHADOW E&M-EST. PATIENT-LVL V: CPT | Mod: PBBFAC,,, | Performed by: PHYSICIAN ASSISTANT

## 2020-05-20 PROCEDURE — 84439 ASSAY OF FREE THYROXINE: CPT

## 2020-05-20 PROCEDURE — 99214 PR OFFICE/OUTPT VISIT, EST, LEVL IV, 30-39 MIN: ICD-10-PCS | Mod: S$GLB,,, | Performed by: PHYSICIAN ASSISTANT

## 2020-05-20 PROCEDURE — 1101F PR PT FALLS ASSESS DOC 0-1 FALLS W/OUT INJ PAST YR: ICD-10-PCS | Mod: CPTII,S$GLB,, | Performed by: PHYSICIAN ASSISTANT

## 2020-05-20 PROCEDURE — 1101F PT FALLS ASSESS-DOCD LE1/YR: CPT | Mod: CPTII,S$GLB,, | Performed by: PHYSICIAN ASSISTANT

## 2020-05-20 PROCEDURE — 99499 RISK ADDL DX/OHS AUDIT: ICD-10-PCS | Mod: S$GLB,,, | Performed by: PHYSICIAN ASSISTANT

## 2020-05-20 PROCEDURE — 99999 PR PBB SHADOW E&M-EST. PATIENT-LVL V: ICD-10-PCS | Mod: PBBFAC,,, | Performed by: PHYSICIAN ASSISTANT

## 2020-05-20 PROCEDURE — 99214 OFFICE O/P EST MOD 30 MIN: CPT | Mod: S$GLB,,, | Performed by: PHYSICIAN ASSISTANT

## 2020-05-20 PROCEDURE — 1159F MED LIST DOCD IN RCRD: CPT | Mod: S$GLB,,, | Performed by: PHYSICIAN ASSISTANT

## 2020-05-20 PROCEDURE — 36415 COLL VENOUS BLD VENIPUNCTURE: CPT | Mod: PO

## 2020-05-20 PROCEDURE — 99499 UNLISTED E&M SERVICE: CPT | Mod: S$GLB,,, | Performed by: PHYSICIAN ASSISTANT

## 2020-05-20 PROCEDURE — 84443 ASSAY THYROID STIM HORMONE: CPT

## 2020-05-20 PROCEDURE — 3044F PR MOST RECENT HEMOGLOBIN A1C LEVEL <7.0%: ICD-10-PCS | Mod: CPTII,S$GLB,, | Performed by: PHYSICIAN ASSISTANT

## 2020-05-20 PROCEDURE — 3078F PR MOST RECENT DIASTOLIC BLOOD PRESSURE < 80 MM HG: ICD-10-PCS | Mod: CPTII,S$GLB,, | Performed by: PHYSICIAN ASSISTANT

## 2020-05-20 PROCEDURE — 1159F PR MEDICATION LIST DOCUMENTED IN MEDICAL RECORD: ICD-10-PCS | Mod: S$GLB,,, | Performed by: PHYSICIAN ASSISTANT

## 2020-05-20 PROCEDURE — 3078F DIAST BP <80 MM HG: CPT | Mod: CPTII,S$GLB,, | Performed by: PHYSICIAN ASSISTANT

## 2020-05-20 PROCEDURE — 83036 HEMOGLOBIN GLYCOSYLATED A1C: CPT

## 2020-05-20 PROCEDURE — 3074F PR MOST RECENT SYSTOLIC BLOOD PRESSURE < 130 MM HG: ICD-10-PCS | Mod: CPTII,S$GLB,, | Performed by: PHYSICIAN ASSISTANT

## 2020-05-20 NOTE — PROGRESS NOTES
Subjective:       Patient ID: Richard Bustos is a 74 y.o. male.    Chief Complaint: Follow-up    Patient with diet-controlled type 2 diabetes, chronic kidney disease stage 3, polyneuropathy, chronic back pain, chronic anxiety, GERD, and dementia presents for routine follow-up.  Patient is able to call his eldest daughter on the phone during the visit.  His daughter explains that over the past few weeks he has had 2 episodes of dizziness nausea profuse sweating  and lightheadedness.  The episodes were brief.  Episodes resolved after  sitting and eating some eggs and drinking milk.  Episodes occurred in the morning time.     Over the past several months patient has weaned from many medications including benzodiazepines, opioid pain medication, SSRIs, Namenda and Aricept.  Patient's daughter explains that he has not taken these medications in approximately 3-4 months.  Otherwise all of his chronic medical conditions appear to be well controlled and stable.  Patients patient medical/surgical, social and family histories have been reviewed       Review of Systems   Constitutional: Positive for diaphoresis. Negative for activity change, appetite change, fatigue and unexpected weight change.   Respiratory: Negative for cough, chest tightness and shortness of breath.    Cardiovascular: Negative for chest pain, palpitations and leg swelling.   Gastrointestinal: Negative for abdominal pain, anal bleeding, blood in stool, constipation, diarrhea and nausea.   Endocrine: Negative for polydipsia and polyuria.   Genitourinary: Negative for difficulty urinating, frequency, hematuria and urgency.   Musculoskeletal: Negative for arthralgias.   Skin: Negative for rash.   Neurological: Positive for dizziness, weakness and light-headedness. Negative for syncope and headaches.   Psychiatric/Behavioral: Negative for dysphoric mood. The patient is not nervous/anxious.        Objective:      Physical Exam   Constitutional: He is oriented to  person, place, and time. He appears well-developed and well-nourished. He is cooperative. No distress.   HENT:   Head: Normocephalic and atraumatic.   Cardiovascular: Normal rate, regular rhythm and normal heart sounds.   Pulmonary/Chest: Effort normal and breath sounds normal.   Musculoskeletal:        Lumbar back: He exhibits decreased range of motion.        Right lower leg: He exhibits no edema.        Left lower leg: He exhibits no edema.   Ambulates slowly with cane    Neurological: He is alert and oriented to person, place, and time. No cranial nerve deficit or sensory deficit.   Skin: Skin is warm and dry. Capillary refill takes less than 2 seconds.   Psychiatric: He has a normal mood and affect. His speech is normal and behavior is normal. Cognition and memory are impaired.       Assessment:       1. Near syncope    2. Essential hypertension    3. Obstructive sleep apnea syndrome    4. PAF (paroxysmal atrial fibrillation)    5. Gastroesophageal reflux disease without esophagitis    6. Hypertension associated with diabetes    7. Diet controlled Type 2 diabetes mellitus with stage 3 chronic kidney disease, without long-term current use of insulin    8. Chronic bilateral back pain, unspecified back location        Plan:       Richard was seen today for follow-up.    Diagnoses and all orders for this visit:    Near syncope  -     Holter monitor - 48 hour; Future  -     Echo Color Flow Doppler? Yes; Future  -     CBC auto differential; Future  -     Comprehensive metabolic panel; Future  -     US Carotid Bilateral; Future    Essential hypertension  -     US Carotid Bilateral; Future    Obstructive sleep apnea syndrome  -     Holter monitor - 48 hour; Future  -     Echo Color Flow Doppler? Yes; Future  -     US Carotid Bilateral; Future    PAF (paroxysmal atrial fibrillation)  -     Holter monitor - 48 hour; Future  -     Echo Color Flow Doppler? Yes; Future  -     TSH; Future  -     T4, free; Future  -     US  Carotid Bilateral; Future    Gastroesophageal reflux disease without esophagitis  On both PPI and famotidine chronically   Hypertension associated with diabetes  -     US Carotid Bilateral; Future    Diet controlled Type 2 diabetes mellitus with stage 3 chronic kidney disease, without long-term current use of insulin  -     Hemoglobin A1C; Future       Chronic bilateral back pain, unspecified back location  -     Ambulatory referral/consult to Pain Clinic; Future          Follow up for labs asap, schedule carotid US, holter and echo apap and follow up wit me in 4 weeks .

## 2020-05-21 ENCOUNTER — PATIENT MESSAGE (OUTPATIENT)
Dept: FAMILY MEDICINE | Facility: CLINIC | Age: 75
End: 2020-05-21

## 2020-05-21 LAB
ESTIMATED AVG GLUCOSE: 126 MG/DL (ref 68–131)
HBA1C MFR BLD HPLC: 6 % (ref 4–5.6)

## 2020-05-22 DIAGNOSIS — I10 ESSENTIAL HYPERTENSION: Primary | ICD-10-CM

## 2020-05-23 ENCOUNTER — PATIENT MESSAGE (OUTPATIENT)
Dept: FAMILY MEDICINE | Facility: CLINIC | Age: 75
End: 2020-05-23

## 2020-05-25 ENCOUNTER — PATIENT MESSAGE (OUTPATIENT)
Dept: FAMILY MEDICINE | Facility: CLINIC | Age: 75
End: 2020-05-25

## 2020-06-04 ENCOUNTER — HOSPITAL ENCOUNTER (OUTPATIENT)
Dept: RADIOLOGY | Facility: HOSPITAL | Age: 75
Discharge: HOME | End: 2020-06-04
Attending: ANESTHESIOLOGY
Payer: MEDICARE

## 2020-06-04 DIAGNOSIS — M47.896 OTHER SPONDYLOSIS, LUMBAR REGION: ICD-10-CM

## 2020-06-04 DIAGNOSIS — M51.36 DEGENERATIVE DISC DISEASE, LUMBAR: ICD-10-CM

## 2020-06-04 PROCEDURE — 72148 MRI LUMBAR SPINE W/O DYE: CPT | Mod: TC

## 2020-06-04 PROCEDURE — 72148 MRI LUMBAR SPINE W/O DYE: CPT | Mod: 26,,, | Performed by: RADIOLOGY

## 2020-06-04 PROCEDURE — 72148 MRI LUMBAR SPINE WITHOUT CONTRAST: ICD-10-PCS | Mod: 26,,, | Performed by: RADIOLOGY

## 2020-06-05 ENCOUNTER — PATIENT MESSAGE (OUTPATIENT)
Dept: UROLOGY | Facility: CLINIC | Age: 75
End: 2020-06-05

## 2020-06-06 ENCOUNTER — TELEPHONE (OUTPATIENT)
Dept: UROLOGY | Facility: CLINIC | Age: 75
End: 2020-06-06

## 2020-06-06 NOTE — TELEPHONE ENCOUNTER
Let him know that compare to his previous ultrasound it shows possibly a stone in his left kidney and still has that complex cyst in the right kidney.  I reviewed his previous images and I do not see anything about a stone so I would like to continue to just watch this.  Will get a kidney ultrasound in and an x-ray in 6 months and if it still shows this area that looks like a stone I might need to order a MRI at that point depending on his kidney function.    However I will order this at his f/u in July     Lab Results       Component                Value               Date                       CREATININE               2.1 (H)             06/02/2020              rbus 6/5/20  Right kidney measures 15.2 cm.  There is cortical thinning and elevated resistive indices measuring 0.81 on the right.  There are multiple cysts on the right largest is 6.1 x 6.6 x 6.6 cm, previously 5.8 x 5.6 x 5.2 cm.  There is a complex lower pole cyst measuring 4.2 x 4.1 x 4.5 cm slightly decreased in size previously measuring 4.7 by 5 cm.  No enlarging solid masses.  No hydronephrosis.    Left kidney measures 13.7 cm.  There is cortical thinning and elevated resistive indices measuring 0.76.  There are multiple simple appearing cysts the largest is 6.1 x 4.1 x 5.2 cm in the midpole.  There is a 1.1 cm calcification likely a stone.  No hydronephrosis.  Urinary bladder demonstrates no significant abnormality.  Both ureteral jets were visualized.  Incidental note is made of cholelithiasis.      rbus 12/2/19  Right kidney: The right kidney measures 16.4 cm. Mild cortical thinning is noted.  No loss of corticomedullary distinction. Resistive index measures 0.81.  Persistent renal cysts are seen throughout the kidney.  The largest in the upper pole measures 5.8 x 5.6 cm.  There is a lower pole complex cyst with internal echoes measuring 4.7 x 5.0 cm.  These are not adversely changed.  No abnormal calcifications no hydronephrosis.    Left kidney:  The left kidney measures 13.7 cm. Mild cortical thinning.  No loss of corticomedullary distinction. Resistive index measures 0.79.  Multiple renal cysts are again identified.  The largest measures 6.2 x 4.3 x 6.6 cm in the midpole.  There is an upper pole 3.2 cm cyst in the lower pole 3.0 cm cyst.  These are simple in nature.  No solid mass.  No renal stone. No hydronephrosis.    The bladder is partially distended at the time of scanning and has an unremarkable appearance.

## 2020-06-09 ENCOUNTER — PATIENT MESSAGE (OUTPATIENT)
Dept: PAIN MEDICINE | Facility: CLINIC | Age: 75
End: 2020-06-09

## 2020-06-09 ENCOUNTER — OFFICE VISIT (OUTPATIENT)
Dept: PAIN MEDICINE | Facility: CLINIC | Age: 75
End: 2020-06-09
Payer: MEDICARE

## 2020-06-09 ENCOUNTER — OFFICE VISIT (OUTPATIENT)
Dept: FAMILY MEDICINE | Facility: CLINIC | Age: 75
End: 2020-06-09
Payer: MEDICARE

## 2020-06-09 ENCOUNTER — TELEPHONE (OUTPATIENT)
Dept: PAIN MEDICINE | Facility: CLINIC | Age: 75
End: 2020-06-09

## 2020-06-09 VITALS
TEMPERATURE: 98 F | HEIGHT: 69 IN | SYSTOLIC BLOOD PRESSURE: 116 MMHG | HEART RATE: 90 BPM | WEIGHT: 294.13 LBS | DIASTOLIC BLOOD PRESSURE: 62 MMHG | BODY MASS INDEX: 43.57 KG/M2

## 2020-06-09 VITALS
DIASTOLIC BLOOD PRESSURE: 65 MMHG | SYSTOLIC BLOOD PRESSURE: 131 MMHG | WEIGHT: 292 LBS | HEIGHT: 69 IN | HEART RATE: 60 BPM | BODY MASS INDEX: 43.25 KG/M2

## 2020-06-09 DIAGNOSIS — G89.29 CHRONIC BILATERAL BACK PAIN, UNSPECIFIED BACK LOCATION: ICD-10-CM

## 2020-06-09 DIAGNOSIS — M47.896 OTHER SPONDYLOSIS, LUMBAR REGION: ICD-10-CM

## 2020-06-09 DIAGNOSIS — M54.9 CHRONIC BILATERAL BACK PAIN, UNSPECIFIED BACK LOCATION: ICD-10-CM

## 2020-06-09 DIAGNOSIS — N18.30 CKD (CHRONIC KIDNEY DISEASE) STAGE 3, GFR 30-59 ML/MIN: ICD-10-CM

## 2020-06-09 DIAGNOSIS — M51.36 DDD (DEGENERATIVE DISC DISEASE), LUMBAR: Primary | ICD-10-CM

## 2020-06-09 DIAGNOSIS — M48.00 CENTRAL STENOSIS OF SPINAL CANAL: ICD-10-CM

## 2020-06-09 DIAGNOSIS — M51.36 DEGENERATIVE DISC DISEASE, LUMBAR: Primary | ICD-10-CM

## 2020-06-09 DIAGNOSIS — R55 NEAR SYNCOPE: ICD-10-CM

## 2020-06-09 DIAGNOSIS — I50.42 CHRONIC COMBINED SYSTOLIC AND DIASTOLIC HEART FAILURE: ICD-10-CM

## 2020-06-09 DIAGNOSIS — I10 ESSENTIAL HYPERTENSION: Primary | ICD-10-CM

## 2020-06-09 PROCEDURE — 1101F PT FALLS ASSESS-DOCD LE1/YR: CPT | Mod: CPTII,S$GLB,, | Performed by: PHYSICIAN ASSISTANT

## 2020-06-09 PROCEDURE — 1159F PR MEDICATION LIST DOCUMENTED IN MEDICAL RECORD: ICD-10-PCS | Mod: S$GLB,,, | Performed by: ANESTHESIOLOGY

## 2020-06-09 PROCEDURE — 3074F SYST BP LT 130 MM HG: CPT | Mod: CPTII,S$GLB,, | Performed by: PHYSICIAN ASSISTANT

## 2020-06-09 PROCEDURE — 3075F SYST BP GE 130 - 139MM HG: CPT | Mod: CPTII,S$GLB,, | Performed by: ANESTHESIOLOGY

## 2020-06-09 PROCEDURE — 99999 PR PBB SHADOW E&M-EST. PATIENT-LVL V: CPT | Mod: PBBFAC,,, | Performed by: PHYSICIAN ASSISTANT

## 2020-06-09 PROCEDURE — 99999 PR PBB SHADOW E&M-EST. PATIENT-LVL V: ICD-10-PCS | Mod: PBBFAC,,, | Performed by: PHYSICIAN ASSISTANT

## 2020-06-09 PROCEDURE — 1159F MED LIST DOCD IN RCRD: CPT | Mod: S$GLB,,, | Performed by: PHYSICIAN ASSISTANT

## 2020-06-09 PROCEDURE — 1159F MED LIST DOCD IN RCRD: CPT | Mod: S$GLB,,, | Performed by: ANESTHESIOLOGY

## 2020-06-09 PROCEDURE — 3078F DIAST BP <80 MM HG: CPT | Mod: CPTII,S$GLB,, | Performed by: ANESTHESIOLOGY

## 2020-06-09 PROCEDURE — 3074F PR MOST RECENT SYSTOLIC BLOOD PRESSURE < 130 MM HG: ICD-10-PCS | Mod: CPTII,S$GLB,, | Performed by: PHYSICIAN ASSISTANT

## 2020-06-09 PROCEDURE — 1125F AMNT PAIN NOTED PAIN PRSNT: CPT | Mod: S$GLB,,, | Performed by: PHYSICIAN ASSISTANT

## 2020-06-09 PROCEDURE — 99214 PR OFFICE/OUTPT VISIT, EST, LEVL IV, 30-39 MIN: ICD-10-PCS | Mod: S$GLB,,, | Performed by: ANESTHESIOLOGY

## 2020-06-09 PROCEDURE — 99499 UNLISTED E&M SERVICE: CPT | Mod: S$GLB,,, | Performed by: PHYSICIAN ASSISTANT

## 2020-06-09 PROCEDURE — 1101F PT FALLS ASSESS-DOCD LE1/YR: CPT | Mod: CPTII,S$GLB,, | Performed by: ANESTHESIOLOGY

## 2020-06-09 PROCEDURE — 1159F PR MEDICATION LIST DOCUMENTED IN MEDICAL RECORD: ICD-10-PCS | Mod: S$GLB,,, | Performed by: PHYSICIAN ASSISTANT

## 2020-06-09 PROCEDURE — 1101F PR PT FALLS ASSESS DOC 0-1 FALLS W/OUT INJ PAST YR: ICD-10-PCS | Mod: CPTII,S$GLB,, | Performed by: ANESTHESIOLOGY

## 2020-06-09 PROCEDURE — 99999 PR PBB SHADOW E&M-EST. PATIENT-LVL V: ICD-10-PCS | Mod: PBBFAC,,, | Performed by: ANESTHESIOLOGY

## 2020-06-09 PROCEDURE — 99213 OFFICE O/P EST LOW 20 MIN: CPT | Mod: S$GLB,,, | Performed by: PHYSICIAN ASSISTANT

## 2020-06-09 PROCEDURE — 99499 RISK ADDL DX/OHS AUDIT: ICD-10-PCS | Mod: S$GLB,,, | Performed by: PHYSICIAN ASSISTANT

## 2020-06-09 PROCEDURE — 3078F DIAST BP <80 MM HG: CPT | Mod: CPTII,S$GLB,, | Performed by: PHYSICIAN ASSISTANT

## 2020-06-09 PROCEDURE — 99999 PR PBB SHADOW E&M-EST. PATIENT-LVL V: CPT | Mod: PBBFAC,,, | Performed by: ANESTHESIOLOGY

## 2020-06-09 PROCEDURE — 99214 OFFICE O/P EST MOD 30 MIN: CPT | Mod: S$GLB,,, | Performed by: ANESTHESIOLOGY

## 2020-06-09 PROCEDURE — 3078F PR MOST RECENT DIASTOLIC BLOOD PRESSURE < 80 MM HG: ICD-10-PCS | Mod: CPTII,S$GLB,, | Performed by: ANESTHESIOLOGY

## 2020-06-09 PROCEDURE — 1125F PR PAIN SEVERITY QUANTIFIED, PAIN PRESENT: ICD-10-PCS | Mod: S$GLB,,, | Performed by: ANESTHESIOLOGY

## 2020-06-09 PROCEDURE — 1125F PR PAIN SEVERITY QUANTIFIED, PAIN PRESENT: ICD-10-PCS | Mod: S$GLB,,, | Performed by: PHYSICIAN ASSISTANT

## 2020-06-09 PROCEDURE — 99213 PR OFFICE/OUTPT VISIT, EST, LEVL III, 20-29 MIN: ICD-10-PCS | Mod: S$GLB,,, | Performed by: PHYSICIAN ASSISTANT

## 2020-06-09 PROCEDURE — 3078F PR MOST RECENT DIASTOLIC BLOOD PRESSURE < 80 MM HG: ICD-10-PCS | Mod: CPTII,S$GLB,, | Performed by: PHYSICIAN ASSISTANT

## 2020-06-09 PROCEDURE — 1125F AMNT PAIN NOTED PAIN PRSNT: CPT | Mod: S$GLB,,, | Performed by: ANESTHESIOLOGY

## 2020-06-09 PROCEDURE — 3075F PR MOST RECENT SYSTOLIC BLOOD PRESS GE 130-139MM HG: ICD-10-PCS | Mod: CPTII,S$GLB,, | Performed by: ANESTHESIOLOGY

## 2020-06-09 PROCEDURE — 1101F PR PT FALLS ASSESS DOC 0-1 FALLS W/OUT INJ PAST YR: ICD-10-PCS | Mod: CPTII,S$GLB,, | Performed by: PHYSICIAN ASSISTANT

## 2020-06-09 RX ORDER — TRAMADOL HYDROCHLORIDE 50 MG/1
50 TABLET ORAL EVERY 8 HOURS PRN
Qty: 90 TABLET | Refills: 0 | Status: SHIPPED | OUTPATIENT
Start: 2020-06-09 | End: 2020-08-31 | Stop reason: SDUPTHER

## 2020-06-09 NOTE — LETTER
June 9, 2020      TORI Newton  [address] [place] [address]           [facility]  [address] [place] [address] [address]  [place] [address]  Phone: [phone]  Fax: [phone]          Patient: Richard Bustos   MR Number: [id number]   YOB: 1945   Date of Visit: 6/9/2020       Dear Ashley Little:    Thank you for referring Richard Bustos to me for evaluation. Attached you will find relevant portions of my assessment and plan of care.    If you have questions, please do not hesitate to call me. I look forward to following Richard Bustos along with you.    Sincerely,    Evangelist Bose MD    Enclosure  CC:  No Recipients    If you would like to receive this communication electronically, please contact [email or web address] or [phone] to request more information on [employer] access.    For providers and/or their staff who would like to refer a patient to [facility], please contact us through our one-stop-shop provider referral line, [facility], at [phone].    If you feel you have received this communication in error or would no longer like to receive these types of communications, please e-mail [email or web address]

## 2020-06-09 NOTE — PROGRESS NOTES
FOLLOW UP NOTE:     CHIEF COMPLAINT: low back pain    INITIAL HISTORY OF PRESENT ILLNESS: Richard Bustos is a 74 y.o. male with PMH significant for CAD s/p PCI (ASA and Plavix), atrial fibrllation on coumadin, hx of bilateral hip replacements, CKD, HTN, and DORA presents for the evaluation of low back pain. Patient was last seen in the [facility] in 8/2017 where he received a bilateral L3, L4, and L5 RFA with great benefit. The patient presents today reporting of a recurrence of his low back pain that was well treated with last RFA. Patient localizes his pain to the center of his lower back without radiation. Patient describes his pain as an aching, burning, and throbbing type of pain. Patient reports that his current pain is a 7/10. Patient reports that his low back pain makes it difficult for him to ambulate. Patient denies of any urinary/fecal incontinence, saddle anesthesia, or weakness. Patient reports of requiring admission to the hospital for heat exhaustion but denies of any other significant interval changes in his health since his RFA.      Aggravating factors: worse in the AM, standing, and walking     Mitigating factors: RFA     Relevant Surgeries: yes; hx of bilateral hip replacements     INTERVAL HISTORY OF PRESENT ILLNESS: Richard Bustos is a 74 y.o. male with PMH significant for CAD s/p PCI (ASA and Plavix), atrial fibrllation on coumadin, hx of bilateral hip replacements, CKD, HTN, and DORA presents as an established patient for the continued management of low back pain. Today, the patient localizes his pain to the middle of his lower back with intermittent radiation down his LLE down to the knee. The patient also reports of burning in his right toe. The patient denies of any significant changes in his health since his last appointment. Patient denies of any urinary/fecal incontinence, saddle anesthesia, or weakness.      The patient reports that he stopped going to PT secondary to  "COVID-19 pandemic. The patient would like a re-referral to PT in order to regain strength back in his lower extremities.     INTERVENTIONAL PAIN HISTORY:  2/17/2020: Left knee intra-articular knee injection - good relief  1/10/2020: Radiofrequency ablation of the L3, L4, L5 medial branch nerves on the bilateral-side - 50% relief  8/3/2017: RFA at L3, 4, 5 - reports 80% relief    CURRENT PAIN MEDICATIONS:   lyrica 75 mg PO BID    ROS:  Review of Systems   Constitutional: Negative for chills and fever.   HENT: Negative for tinnitus.    Eyes: Negative for visual disturbance.   Respiratory: Negative for shortness of breath.    Cardiovascular: Negative for chest pain.   Gastrointestinal: Negative for nausea and vomiting.   Genitourinary: Negative for difficulty urinating.   Musculoskeletal: Positive for arthralgias and back pain.   Skin: Negative for rash.   Allergic/Immunologic: Negative for immunocompromised state.   Neurological: Negative for syncope.   Hematological: Does not bruise/bleed easily.   Psychiatric/Behavioral: Negative for suicidal ideas.        MEDICAL, SURGICAL, FAMILY, SOCIAL HX: reviewed    MEDICATIONS/ALLERGIES: reviewed    PHYSICAL EXAM:    VITALS: Vitals reviewed.   Vitals:    06/09/20 1050   BP: 131/65   Pulse: 60   Weight: 132.5 kg (292 lb)   Height: 5' 9" (1.753 m)   PainSc:   4   PainLoc: Back       Physical Exam   Constitutional: He is oriented to person, place, and time and well-developed, well-nourished, and in no distress.   HENT:   Head: Normocephalic and atraumatic.   Eyes: Conjunctivae and EOM are normal. Right eye exhibits no discharge. Left eye exhibits no discharge.   Cardiovascular: Normal rate.   Pulmonary/Chest: Effort normal and breath sounds normal. No respiratory distress.   Abdominal: Soft.   Neurological: He is alert and oriented to person, place, and time.   Skin: Skin is warm and dry. No rash noted. He is not diaphoretic.   Psychiatric: Mood, memory, affect and judgment " normal.   Nursing note and vitals reviewed.     UPPER EXTREMITIES: Normal alignment, normal range of motion, no atrophy, no skin changes,  hair growth and nail growth normal and equal bilaterally. No swelling, no tenderness.    LOWER EXTREMITIES:  Normal alignment, normal range of motion, no atrophy, no skin changes,  hair growth and nail growth normal and equal bilaterally. No swelling, no tenderness.     LUMBAR SPINE  Lumbar spine: ROM is significantly limited with flexion extension and oblique extension with increased pain.    Supine straight leg raise: unable to perform secondary to fall risk   ((+)) Facet loading bilaterally  Internal and external rotation of the hips: unable to perform secondary to fall risk  Myofascial exam: No tenderness to palpation across lumbar paraspinous muscles.     MOTOR: Tone and bulk: normal bilateral upper and lower Strength: normal   Delt      Bi         Tri        WE      WF                        R          5          5          5          5          5          5            L          5          5          5          5          5          5               IP         ADD     ABD     Quad   TA        Gas      HAM  R          5          5          5          5          5          5          5  L          5          5          5          5          5          5          5     SENSATION: Light touch and pinprick intact bilaterally  REFLEXES: normal, symmetric, nonbrisk.  Toes down, no clonus. Negative roca's sign bilaterally.  GAIT: normal rise, base, steps, and arm swing.      IMAGING: MRI Lumbar spine without contrast (6/4/2020):  T11-12: Posterior disc osteophyte complex, which mildly narrows the spinal canal and slightly flattens the ventral aspect of the cord.  Mild bilateral facet arthropathy.  No neural foraminal stenosis.    T12-L1: Mild circumferential disc bulge.  Moderate bilateral facet arthropathy.  No neural foraminal or spinal canal stenosis.    L1-L2:  Circumferential disc bulge with posterior osteophytic ridging.  Moderate bilateral facet arthropathy.  Ligamentum flavum infolding.  Mild bilateral neural foraminal stenosis, right greater than left. Mild spinal canal stenosis.    L2-L3: Trace retrolisthesis.  Circumferential disc bulge with posterior osteophytic ridging.  Moderate bilateral facet arthropathy.  Ligamentum flavum infolding.  Severe right and moderate to severe left neural foraminal stenosis.  Moderate to severe spinal canal stenosis.    L3-L4: Trace retrolisthesis.  Circumferential disc bulge with posterior osteophytic ridging and small central disc extrusion with slight caudad extension.  Moderate bilateral facet arthropathy.  Moderate bilateral neural foraminal stenosis.  Moderate to severe spinal canal stenosis.    L4-L5: Trace anterolisthesis.  Circumferential disc bulge with broad-based right subarticular/foraminal disc protrusion.  Severe bilateral facet arthropathy.  Ligamentum flavum infolding.  Moderate right and mild-to-moderate left neural foraminal stenosis.  Severe spinal canal stenosis.    L5-S1: Circumferential disc bulge.  Severe bilateral facet arthropathy.  Ligamentum flavum infolding.  Mild-to-moderate left and mild right neural foraminal stenosis.  There is no spinal canal stenosis.    ASSESSMENT: Richard Bustos is a 74 y.o. male with PMH significant for CAD s/p PCI (ASA and Plavix), atrial fibrllation on coumadin, hx of bilateral hip replacements, CKD, HTN, and DORA presents as an established patient for the continued management of low back pain. Previously with good benefit from last RFA. I suspect DDD and spinal canal stenosis are contributing to the patient's pain. Treatment plan outlined below.     PLAN:  1. Schedule for L3-L4 lumbar interlaminar epidural steroid injection; patient will require clearance from his PCP to hold/bridge his coumadin for his procedure; plan to repeat RFA in the future thereafter  2. Re-referral for  PT for low back pain  3. Prescribed Tramadol 50 mg PO q 8 hr PRN; #90 tablets; 0 refills  4. I have stressed the importance of physical activity and a home exercise plan to help with chronic pain and improve health.  5. RTC for the procedure as outlined above    Evangelist Bose MD  Pain Management

## 2020-06-09 NOTE — TELEPHONE ENCOUNTER
Patient is on coumadin and scheduled for Epidural steroid injection on 6/22/20. Please advise if ok to stop coumadin 5 days prior to procedure

## 2020-06-09 NOTE — PROGRESS NOTES
Subjective:       Patient ID: Richard Bustos is a 74 y.o. male.    Chief Complaint: Follow-up    Patient with diet controlled type 2 diabetes, CKD stage 3, polyneuropathy, chronic back pain, chronic anxiety, GERD, congestive heart failure, PAF, CAD and dementia presents for follow up.  Last visit was 1 month ago and he was having episodes of dizziness, nausea and sweating.  He states that he feels these episodes occur if he is over heated.  He is alone in the visit today and he cannot recall if he has had any more of these episodes.  He had lab showing a decline in kidney function and he tells me that he has seen a nephrologist.  He cannot recall the name of nephrologist.  He had echo done and cardiology has reviewed.  His biggest complaint is uncontrolled low back pain.  He was seen today by pain management and now has a plan in place.   Patients patient medical/surgical, social and family histories have been reviewed       Review of Systems   Constitutional: Positive for activity change and fatigue. Negative for appetite change and fever.   Gastrointestinal: Negative for abdominal pain.   Genitourinary: Negative for decreased urine volume, difficulty urinating, dysuria, enuresis, flank pain, frequency, hematuria and urgency.   Musculoskeletal: Positive for back pain and gait problem.   Skin: Negative for rash and wound.   Neurological: Positive for dizziness and light-headedness. Negative for weakness and numbness.       Objective:      Physical Exam   Constitutional: He appears well-developed and well-nourished. He is cooperative. No distress.   HENT:   Head: Normocephalic and atraumatic.   Cardiovascular: Normal rate, regular rhythm and normal heart sounds.   Pulmonary/Chest: Effort normal and breath sounds normal.   Musculoskeletal:        Right lower leg: He exhibits no edema.        Left lower leg: He exhibits no edema.   Neurological: He is alert.   Skin: Skin is warm and dry. Capillary refill takes less than  2 seconds.       Assessment:       1. Essential hypertension    2. Near syncope    3. CKD (chronic kidney disease) stage 3, GFR 30-59 ml/min, 41    4. Chronic combined systolic and diastolic heart failure        Plan:       Richard was seen today for follow-up.    Diagnoses and all orders for this visit:    Essential hypertension, at goal   Continue current medication   Near syncope, resolved work up negative thus far  Continue current meds and follow up cardiology     CKD (chronic kidney disease) stage 3, GFR 30-59 ml/min, 41  We needs to find out if he was infact seen by nephrology   Chronic combined systolic and diastolic heart failure     continue per cardiology

## 2020-06-10 NOTE — TELEPHONE ENCOUNTER
MD Staci Ricks Jr., LPN   Caller: Unspecified (Yesterday,  3:16 PM)             OK from my standpoint to hold Coumadin 5 days prior to procedure.

## 2020-06-20 ENCOUNTER — LAB VISIT (OUTPATIENT)
Dept: PRIMARY CARE CLINIC | Facility: CLINIC | Age: 75
End: 2020-06-20
Payer: MEDICARE

## 2020-06-20 PROCEDURE — U0003 INFECTIOUS AGENT DETECTION BY NUCLEIC ACID (DNA OR RNA); SEVERE ACUTE RESPIRATORY SYNDROME CORONAVIRUS 2 (SARS-COV-2) (CORONAVIRUS DISEASE [COVID-19]), AMPLIFIED PROBE TECHNIQUE, MAKING USE OF HIGH THROUGHPUT TECHNOLOGIES AS DESCRIBED BY CMS-2020-01-R: HCPCS

## 2020-06-22 ENCOUNTER — LAB VISIT (OUTPATIENT)
Dept: LAB | Facility: HOSPITAL | Age: 75
End: 2020-06-22
Attending: ANESTHESIOLOGY
Payer: MEDICARE

## 2020-06-22 ENCOUNTER — HOSPITAL ENCOUNTER (OUTPATIENT)
Facility: AMBULARY SURGERY CENTER | Age: 75
Discharge: HOME | End: 2020-06-22
Attending: ANESTHESIOLOGY | Admitting: ANESTHESIOLOGY
Payer: MEDICARE

## 2020-06-22 VITALS
BODY MASS INDEX: 43.43 KG/M2 | TEMPERATURE: 98 F | DIASTOLIC BLOOD PRESSURE: 73 MMHG | RESPIRATION RATE: 18 BRPM | HEART RATE: 50 BPM | OXYGEN SATURATION: 96 % | SYSTOLIC BLOOD PRESSURE: 143 MMHG | WEIGHT: 294.13 LBS

## 2020-06-22 DIAGNOSIS — M51.36 DEGENERATIVE DISC DISEASE, LUMBAR: Primary | ICD-10-CM

## 2020-06-22 DIAGNOSIS — Z79.01 ANTICOAGULATED ON COUMADIN: ICD-10-CM

## 2020-06-22 LAB
INR PPP: 1 (ref 0.8–1.2)
PROTHROMBIN TIME: 11 SEC (ref 9–12.5)
SARS-COV-2 RDRP RESP QL NAA+PROBE: NEGATIVE
SARS-COV-2 RNA RESP QL NAA+PROBE: NOT DETECTED

## 2020-06-22 PROCEDURE — 62323 NJX INTERLAMINAR LMBR/SAC: CPT | Mod: ,,, | Performed by: ANESTHESIOLOGY

## 2020-06-22 PROCEDURE — 62323 PR INJ LUMBAR/SACRAL, W/IMAGING GUIDANCE: ICD-10-PCS | Mod: ,,, | Performed by: ANESTHESIOLOGY

## 2020-06-22 PROCEDURE — 36415 COLL VENOUS BLD VENIPUNCTURE: CPT

## 2020-06-22 PROCEDURE — 85610 PROTHROMBIN TIME: CPT

## 2020-06-22 PROCEDURE — 62323 NJX INTERLAMINAR LMBR/SAC: CPT | Performed by: ANESTHESIOLOGY

## 2020-06-22 PROCEDURE — U0002 COVID-19 LAB TEST NON-CDC: HCPCS

## 2020-06-22 RX ORDER — SODIUM CHLORIDE, SODIUM LACTATE, POTASSIUM CHLORIDE, CALCIUM CHLORIDE 600; 310; 30; 20 MG/100ML; MG/100ML; MG/100ML; MG/100ML
INJECTION, SOLUTION INTRAVENOUS ONCE AS NEEDED
Status: COMPLETED | OUTPATIENT
Start: 2020-06-22 | End: 2023-04-20

## 2020-06-22 RX ORDER — METHYLPREDNISOLONE ACETATE 80 MG/ML
INJECTION, SUSPENSION INTRA-ARTICULAR; INTRALESIONAL; INTRAMUSCULAR; SOFT TISSUE
Status: DISCONTINUED | OUTPATIENT
Start: 2020-06-22 | End: 2020-06-22 | Stop reason: HOSPADM

## 2020-06-22 RX ORDER — LIDOCAINE HYDROCHLORIDE 10 MG/ML
INJECTION, SOLUTION EPIDURAL; INFILTRATION; INTRACAUDAL; PERINEURAL
Status: DISCONTINUED | OUTPATIENT
Start: 2020-06-22 | End: 2020-06-22 | Stop reason: HOSPADM

## 2020-06-22 RX ORDER — SODIUM CHLORIDE 9 MG/ML
INJECTION, SOLUTION INTRAMUSCULAR; INTRAVENOUS; SUBCUTANEOUS
Status: DISCONTINUED | OUTPATIENT
Start: 2020-06-22 | End: 2020-06-22 | Stop reason: HOSPADM

## 2020-06-22 NOTE — INTERVAL H&P NOTE
The patient has been examined and the H&P has been reviewed:    I concur with the findings and no changes have occurred since H&P was written.     This patient has been cleared for surgery in an ambulatory surgical facility    ASA 3,  Mallampatti Score 3  No history of anesthetic complications  Plan for local anesthesia only      Anesthesia/Surgery risks, benefits and alternative options discussed and understood by patient/family.          Active Hospital Problems    Diagnosis  POA    Degenerative disc disease, lumbar [M51.36]  Yes      Resolved Hospital Problems   No resolved problems to display.

## 2020-06-22 NOTE — DISCHARGE SUMMARY
[facility]  Discharge Note  Short Stay    Admit Date: 6/22/2020    Discharge Date and Time: 6/22/2020    Attending Physician: Evangelist Bose MD     Discharge Provider: Evangelist Bose    Diagnoses:  Active Hospital Problems    Diagnosis  POA    *Degenerative disc disease, lumbar [M51.36]  Yes      Resolved Hospital Problems   No resolved problems to display.       Hospital Course: Lumbar interlaminar epidural steroid injection     Discharged Condition: Good    Final Diagnoses:   Active Hospital Problems    Diagnosis  POA    *Degenerative disc disease, lumbar [M51.36]  Yes      Resolved Hospital Problems   No resolved problems to display.       Disposition: Home or Self Care    Follow up/Patient Instructions:    Medications:  Reconciled Home Medications:      Medication List      CONTINUE taking these medications    allopurinoL 100 MG tablet  Commonly known as: ZYLOPRIM  TAKE 1 TABLET(100 MG) BY MOUTH EVERY DAY     ammonium lactate 12 % Crea  Apply twice daily to affected parts both feet as needed.     aspirin 81 MG EC tablet  Commonly known as: ECOTRIN  Take 81 mg by mouth once daily.     atorvastatin 80 MG tablet  Commonly known as: LIPITOR  TAKE 1 TABLET(80 MG) BY MOUTH EVERY DAY     calcitRIOL 0.5 MCG Cap  Commonly known as: ROCALTROL  once daily.     calcium carbonate 400 mg calcium (1,000 mg) Chew  Commonly known as: TUMS ULTRA  Take 1 tablet (400 mg total) by mouth once daily.     carvediloL 25 MG tablet  Commonly known as: COREG  TAKE 1 TABLET(25 MG) BY MOUTH TWICE DAILY WITH MEALS     CENTRUM SILVER ORAL  Take 1 tablet by mouth once daily.     ciclopirox 8 % Soln  Commonly known as: PENLAC  Apply topically nightly.     clopidogreL 75 mg tablet  Commonly known as: PLAVIX  TAKE 1 TABLET BY MOUTH DAILY     ergocalciferol 50,000 unit Cap  Commonly known as: ERGOCALCIFEROL  ergocalciferol (vitamin D2) 1,250 mcg (50,000 unit) capsule   TK 1 C PO Q WK     famotidine 40 MG tablet  Commonly known as:  PEPCID  Take 1 tablet (40 mg total) by mouth once daily.     ferrous sulfate 325 mg (65 mg iron) Tab tablet  Commonly known as: FEOSOL  Take 1 tablet (325 mg total) by mouth 2 (two) times daily.     fluticasone propionate 50 mcg/actuation nasal spray  Commonly known as: FLONASE  2 sprays (100 mcg total) by Each Nostril route once daily.     furosemide 40 MG tablet  Commonly known as: LASIX  TAKE 1 TABLET BY MOUTH DAILY AS NEEDED     hydrocortisone 2.5 % cream  Apply topically 2 (two) times daily. for 10 days     lisinopriL 40 MG tablet  Commonly known as: PRINIVIL,ZESTRIL  Take 1 tablet (40 mg total) by mouth every evening.     LORazepam 2 MG Tab  Commonly known as: ATIVAN  Take 1 tablet (2 mg total) by mouth as needed (pre-MRI anxiety).     magnesium oxide 400 mg (241.3 mg magnesium) tablet  Commonly known as: MAG-OX  TAKE 1 TABLET BY MOUTH EVERY DAY     mecobal-levomefolat Ca-B6 phos 3-35-2 mg Tab  Commonly known as: L-METHYL-B6-B12  Take 1 tablet by mouth 2 (two) times daily.     mirabegron 50 mg Tb24  Commonly known as: MYRBETRIQ  Take 1 tablet (50 mg total) by mouth once daily.     nitroGLYCERIN 0.4 MG/DOSE TL SPRY 400 mcg/spray spray  Commonly known as: NITROLINGUAL  PLACE 1 SPRAY UNDER THE TONGUE EVERY 5 MINUTES AS NEEDED FOR CHEST PAIN     OCUVITE [id number] mg-unit-mg-mg Cap  Generic drug: vit C-vit E-lutein-min-om-3  Take 1 capsule by mouth once daily.     omeprazole 20 MG capsule  Commonly known as: PRILOSEC  TAKE 1 CAPSULE BY MOUTH DAILY     PRESERVISION AREDS ORAL  PreserVision AREDS     traMADoL 50 mg tablet  Commonly known as: ULTRAM  Take 1 tablet (50 mg total) by mouth every 8 (eight) hours as needed for Pain.     warfarin 5 MG tablet  Commonly known as: COUMADIN  Current dose: Take 5mg daily or as directed by coumadin clinic          Discharge Procedure Orders   Diet general     Call MD for:  temperature >100.4     Call MD for:  persistent nausea and vomiting     Call MD for:  severe uncontrolled  pain     Call MD for:  difficulty breathing, headache or visual disturbances     Call MD for:  redness, tenderness, or signs of infection (pain, swelling, redness, odor or green/yellow discharge around incision site)     Call MD for:  hives     Call MD for:  persistent dizziness or light-headedness     Call MD for:  extreme fatigue        Follow up with MD in 2-3 weeks    Discharge Procedure Orders (must include Diet, Follow-up, Activity):   Discharge Procedure Orders (must include Diet, Follow-up, Activity)   Diet general     Call MD for:  temperature >100.4     Call MD for:  persistent nausea and vomiting     Call MD for:  severe uncontrolled pain     Call MD for:  difficulty breathing, headache or visual disturbances     Call MD for:  redness, tenderness, or signs of infection (pain, swelling, redness, odor or green/yellow discharge around incision site)     Call MD for:  hives     Call MD for:  persistent dizziness or light-headedness     Call MD for:  extreme fatigue

## 2020-06-22 NOTE — PLAN OF CARE
Patient is being driven home by his son Drake. He applied his watch before leaving. He walked to the truck using  his cane. Dr Bose spoke with him prior to discharge.

## 2020-06-22 NOTE — OP NOTE
PROCEDURE DATE: 6/22/2020    PROCEDURE:  Lumbar interlaminar epidural steroid injection at L5-S1 under fluoroscopic guidance.    DIAGNOSIS: LUMBAR DISC DISPLACEMENT WITHOUT MYELOPATHY  POSTOP DIAGNOSIS: SAME    PHYSICIAN: Evangelist Bose M.D.    MEDICATIONS INJECTED: 80 mg depo-medrol with 4 ml of preservative free NaCl    LOCAL ANESTHETIC INJECTED:    Lidocaine 1% 10 ml total    SEDATION MEDICATIONS: N/A; local anesthesia only    ESTIMATED BLOOD LOSS:  none    COMPLICATIONS:  Yes, unable to access the L3-L4 and L4-L5 interspace secondary to osteophytic formation; attempt at the caudal epidural space resulted in intra-vascular contrast administration. The L5-S1 interspace was accessed without difficulty using the 4 inch tuohy needle.     TECHNIQUE:  Time-out taken to identify patient and procedure prior to starting the procedure.  With the patient laying in a prone position, the area was prepped and draped in the usual sterile fashion using ChloraPrep and a fenestrated drape.  After determining the target level with an AP fluoroscopic view, local anesthetic was given using a 25-gauge 1.5 inch needle by raising a wheal and then infiltrating toward the interlaminar entry space.  A 4 inch 20-gauge Touhy needle was introduced under AP fluoroscopic guidance to the interlaminar space of L5-S1. Once the trajectory was established, the needle was visualized in the lateral view and advanced using loss of resistance technique. Once in the desired position, 3 mL contrast was injected to confirm placement and there was no vascular uptake nor intrathecal spread.  The medication was then injected slowly. The patient tolerated the procedure well.      The patient was monitored after the procedure.   They were given post-procedure and discharge instructions to follow at home.  The patient was discharged in a stable condition.

## 2020-06-24 DIAGNOSIS — I10 ESSENTIAL HYPERTENSION: ICD-10-CM

## 2020-06-24 RX ORDER — LANOLIN ALCOHOL/MO/W.PET/CERES
CREAM (GRAM) TOPICAL
Qty: 90 TABLET | Refills: 3 | Status: SHIPPED | OUTPATIENT
Start: 2020-06-24 | End: 2021-06-24

## 2020-07-05 ENCOUNTER — NURSE TRIAGE (OUTPATIENT)
Dept: ADMINISTRATIVE | Facility: CLINIC | Age: 75
End: 2020-07-05

## 2020-07-16 ENCOUNTER — OFFICE VISIT (OUTPATIENT)
Dept: UROLOGY | Facility: CLINIC | Age: 75
End: 2020-07-16
Payer: MEDICARE

## 2020-07-16 VITALS
WEIGHT: 288.56 LBS | HEART RATE: 60 BPM | SYSTOLIC BLOOD PRESSURE: 163 MMHG | BODY MASS INDEX: 42.74 KG/M2 | HEIGHT: 69 IN | DIASTOLIC BLOOD PRESSURE: 73 MMHG

## 2020-07-16 DIAGNOSIS — N28.1 RENAL CYST, NATIVE, HEMORRHAGE: ICD-10-CM

## 2020-07-16 DIAGNOSIS — N32.81 OAB (OVERACTIVE BLADDER): ICD-10-CM

## 2020-07-16 DIAGNOSIS — N28.89 RENAL CYST, NATIVE, HEMORRHAGE: ICD-10-CM

## 2020-07-16 DIAGNOSIS — C61 PROSTATE CANCER: Primary | ICD-10-CM

## 2020-07-16 LAB
BILIRUB SERPL-MCNC: ABNORMAL MG/DL
BLOOD URINE, POC: ABNORMAL
CLARITY, POC UA: CLEAR
COLOR, POC UA: YELLOW
GLUCOSE UR QL STRIP: ABNORMAL
KETONES UR QL STRIP: ABNORMAL
LEUKOCYTE ESTERASE URINE, POC: ABNORMAL
NITRITE, POC UA: ABNORMAL
PH, POC UA: 5.5
PROTEIN, POC: 100
SPECIFIC GRAVITY, POC UA: 1.02
UROBILINOGEN, POC UA: ABNORMAL

## 2020-07-16 PROCEDURE — 99213 OFFICE O/P EST LOW 20 MIN: CPT | Mod: 25,S$GLB,, | Performed by: UROLOGY

## 2020-07-16 PROCEDURE — 3077F SYST BP >= 140 MM HG: CPT | Mod: CPTII,S$GLB,, | Performed by: UROLOGY

## 2020-07-16 PROCEDURE — 3077F PR MOST RECENT SYSTOLIC BLOOD PRESSURE >= 140 MM HG: ICD-10-PCS | Mod: CPTII,S$GLB,, | Performed by: UROLOGY

## 2020-07-16 PROCEDURE — 1101F PT FALLS ASSESS-DOCD LE1/YR: CPT | Mod: CPTII,S$GLB,, | Performed by: UROLOGY

## 2020-07-16 PROCEDURE — 3078F DIAST BP <80 MM HG: CPT | Mod: CPTII,S$GLB,, | Performed by: UROLOGY

## 2020-07-16 PROCEDURE — 99999 PR PBB SHADOW E&M-EST. PATIENT-LVL V: CPT | Mod: PBBFAC,,, | Performed by: UROLOGY

## 2020-07-16 PROCEDURE — 81002 URINALYSIS NONAUTO W/O SCOPE: CPT | Mod: S$GLB,,, | Performed by: UROLOGY

## 2020-07-16 PROCEDURE — 1159F PR MEDICATION LIST DOCUMENTED IN MEDICAL RECORD: ICD-10-PCS | Mod: S$GLB,,, | Performed by: UROLOGY

## 2020-07-16 PROCEDURE — 3008F BODY MASS INDEX DOCD: CPT | Mod: CPTII,S$GLB,, | Performed by: UROLOGY

## 2020-07-16 PROCEDURE — 99213 PR OFFICE/OUTPT VISIT, EST, LEVL III, 20-29 MIN: ICD-10-PCS | Mod: 25,S$GLB,, | Performed by: UROLOGY

## 2020-07-16 PROCEDURE — 99499 RISK ADDL DX/OHS AUDIT: ICD-10-PCS | Mod: S$GLB,,, | Performed by: UROLOGY

## 2020-07-16 PROCEDURE — 3078F PR MOST RECENT DIASTOLIC BLOOD PRESSURE < 80 MM HG: ICD-10-PCS | Mod: CPTII,S$GLB,, | Performed by: UROLOGY

## 2020-07-16 PROCEDURE — 81002 POCT URINE DIPSTICK WITHOUT MICROSCOPE: ICD-10-PCS | Mod: S$GLB,,, | Performed by: UROLOGY

## 2020-07-16 PROCEDURE — 1159F MED LIST DOCD IN RCRD: CPT | Mod: S$GLB,,, | Performed by: UROLOGY

## 2020-07-16 PROCEDURE — 1101F PR PT FALLS ASSESS DOC 0-1 FALLS W/OUT INJ PAST YR: ICD-10-PCS | Mod: CPTII,S$GLB,, | Performed by: UROLOGY

## 2020-07-16 PROCEDURE — 99499 UNLISTED E&M SERVICE: CPT | Mod: S$GLB,,, | Performed by: UROLOGY

## 2020-07-16 PROCEDURE — 3008F PR BODY MASS INDEX (BMI) DOCUMENTED: ICD-10-PCS | Mod: CPTII,S$GLB,, | Performed by: UROLOGY

## 2020-07-16 PROCEDURE — 99999 PR PBB SHADOW E&M-EST. PATIENT-LVL V: ICD-10-PCS | Mod: PBBFAC,,, | Performed by: UROLOGY

## 2020-07-16 NOTE — PATIENT INSTRUCTIONS
· Continue myrbetriq 50mg (has refills until 3/2020)  · Recent psa was 0.1, needs psa every 6 months until October 2021 (completed radiation in 2016). Then psa yearly for rest of life. psa in 6 months for now.   · Recent rbus showed RLP complex cyst smaller than before, continue to monitor with another rbus in 6 months.  · rbus also showed left 1.1cm kidney stone. If next rbus shows the same will order a ct renal stone study.    F/u 6 months with psa diagnostic and rbus prior.

## 2020-07-16 NOTE — PROGRESS NOTES
"[facility] - [place]  Urology Group: Isa/Brijesh/Becca/Galina  PCP: Dr.James newcomb [facility]    Chief Complaint: Follow-up (6mo/ psa)      Subjective:        HPI: Richard Bustos is a 74 y.o. male presents with       Prostate cancer, Gl 4+4 s/p XRT  completed in 2016+ 2.5 years ADT  Pt was referred to  for Gl 4+4, T2c, N0M0 prostate cancer in June 2016. Decided on radiation with concurrent ADT x 2 years . Underwent gold seed on 8/1/16. Pt completed 7560 Gy, 42 fractions of IRT (9/1/16-10/31/16). first Trelstar given 6/2016, last given 7/26/18.      Right renal hemhorragic cysts rbus 1/22/19 Two complex lesions at the lower pole right kidney.  The larger 4.9cm is unchanged.  The smaller has increased in size, now measuring 3.2 cm. Multiple additional simple renal cysts bilaterally.    -He states he tolerated the radiation but when I saw him in December 2017 he was having Frequency q2-3 hours, UUI that occured 3-4x a daily requiring 1 pad a day. Not on any med. Denies straining. Good stream. I started him on myrbetriq 50mg once a day in 2017 which improved his oab sx   -Pt was last seen on 12/5/19- was doing well on myrbetriq 50mg once a day.   psa <0.1 12/2/19 and 6 months ago. Prior to this was 0.01 but may be calibration issue.   -rbus 12/2/19 only showed 5cm RLP complex cyst (previously had 2) likely  dependent. rbus prior to this had shown 4. Plan for repeat rbus for 1 year  .   Interval history 3/31/20:   He did not have f/u planned until June with psa prior, but about 3d ago started having foul smelling urine. Not cloudy. No increasing frequency. Circumcised. No gross hematuria. Still smells abnormal- "acidy urine smell". (in 4/2018 had a "strong smell" and cx was normal).  No change in diet. Last uti was years ago and the sx included dysuria, malodoros urine.    Also retaining some fluid.   Has appt with  4/6    Still on " "myrbetriq 50mg once daily. Still wearing depends - 1 a day and leaks with drips UUI 1x a day. Voids 4x a day.     Rec'd continuing myrbetriq, timed voiding, psa q6 months until 2021, rbus dec 2020,  diag psa and pvr f/u    Interval history 7/16/20:     No significant UUI on myrbetriq  psa 6/4/20 <0.10  ua today with 100 prot/tr bld  rbus 6/5/20 "RLP complex lower pole cyst measuring 4.2 x 4.1 x 4.5 cm slightly decreased in size previously measuring 4.7 by 5 cm and left 1.1cm calcification likely a stone". The stone is new since last rbus if present.   pvr by scan: 0cc         psa history:  6/4/20  <0.10  12/2/19 <0.1  5/10/19 <0.1  4/24/19 <0.01  4/23/19 0.10  1/15/19 <0.01  10/16/18 <0.01  7/26/18 trelstar 22.5mg IM in right gluteus  7/5/18  <0.01, T 11  1/12/18 Bone density scan normal.   1/4/18  <0.01, T 14 - administered trelstar, started Fosamax  6/12/17 <0.01, T 13 - administered trelstar, started ca/vitD  12/12/16 <0.01 - adminstered trelstar  10/31/16 Completed radiation   8/1/16  3.3 - underwent goldseed  6/2016  trelstar  5/23/16 trus bx: Gl 4+4, volume 49.6g. Bone scan and CT scan negative  3/18/16 7.7  2/3/15   6.1  8/22/14  5.3  9/26/11  3.9  8/26/10  3.0    ua today void: 100 prot/tr bld  Urine history:  6/25/20 1+prot, 1 rbc/2 wbc/few bact/3 hyaline  1/3/20  1+protein  12/5/19 100 protein/tr bld  10/18/19 1+protein  9/30/19 Tr prot/tr bld  7/2/19  1+prot/tr bld  5/14/19 100 prot  4/5/19  1+prot  3/29/19 2_prot/tr bld  7/2018  No cx, void: 2+protein   4/12/18 Ng, void: 2+prot/tr blood- no dysuria "smell strong"  3/18/16 Cytology: neg  11/4/14 Ng, cytology: neg       REVIEW OF SYSTEMS:  General ROS: no fevers, no chills  Psychological ROS: no depression  Endocrine ROS: no heat or cold  Respiratory ROS: no SOB  Cardiovascular ROS: no CP, + easy bruising  Gastrointestinal ROS: no abdominal pain, no constipation, no diarrhea, no BRBPR  Musculoskeletal ROS: no muscle pain  Neurological ROS: no " headaches  Dermatological ROS: no rashes  HEENT: +glasses, no sinus   ROS: per HPI    Past Medical History:   Diagnosis Date    Anemia     Anemia of other chronic disease 9/13/2017    Anemia, chronic renal failure 9/13/2017    Anemia, unspecified 9/13/2017    Anticoagulant long-term use     Aorta aneurysm     Arthritis     Atrial fibrillation     CAD (coronary artery disease)     CHF (congestive heart failure)     Chronic kidney disease     Clotting disorder 9/13/2017    Colon polyp     Dementia     Diabetes mellitus     not on meds    Diabetes mellitus type II     Diverticulosis     Elevated PSA     Encounter for blood transfusion     Former smoker     General anesthetics causing adverse effect in therapeutic use     TROUBLE COMING OUT OF ANESTHESIA WITH GASTRIC BYPASS    GERD (gastroesophageal reflux disease)     Gout     Hx of colonic polyps     Hypercoagulable state     Hyperlipidemia     Hypertension     MI, old 2010    MTHFR mutation     Myocardial infarction     9/2010    Osteomyelitis of ankle or foot 3/9/2017    Prostate cancer 06/2016    Sleep apnea     Squamous cell carcinoma 01/23/2018    Left helix, imiquimod    Venous stasis     Chronic     Past Surgical History:   Procedure Laterality Date    ABDOMINAL SURGERY      CARDIAC SURGERY      3 STENTS     CAUDAL EPIDURAL STEROID INJECTION N/A 6/22/2020    Procedure: INJECTION, STEROID, SPINE, EPIDURAL, CAUDAL;  Surgeon: Evangelist Bose MD;  Location: [facility] OR;  Service: Pain Management;  Laterality: N/A;    COLONOSCOPY  02/2011    diverticulosis with diverticula with clot, no active bleeding    COLONOSCOPY N/A 8/24/2016    Procedure: COLONOSCOPY;  Surgeon: Saroj Borjas MD;  Location: [facility];  Service: Endoscopy;  Laterality: N/A;    EPIDURAL STEROID INJECTION INTO LUMBAR SPINE N/A 6/22/2020    Procedure: Injection-steroid-epidural-lumbar;  Surgeon: Evangelist Bose MD;  Location: [facility] OR;  Service: Pain  Management;  Laterality: N/A;  L3 L4 L5 S1    GASTRIC BYPASS  2/5/2008    IVC FILTER RETRIEVAL      JOINT REPLACEMENT  1996 and 2001    bi-lat hip replacement/Rt Hip and Lt Hip    RADIOFREQUENCY ABLATION OF LUMBAR MEDIAL BRANCH NERVE AT SINGLE LEVEL Bilateral 1/10/2020    Procedure: Radiofrequency Ablation, Nerve, Spinal, Lumbar, Medial Branch, 1 Level;  Surgeon: Evangelist Bose MD;  Location: [facility] OR;  Service: Pain Management;  Laterality: Bilateral;  L3,L4,L5    ROTATOR CUFF REPAIR      Rt shoulder    Stents  8/18/2010    x 3    UPPER GASTROINTESTINAL ENDOSCOPY  02/2011     Social and family hx reviewed      There have not been any changes.    Cataracts? removed    Urologic meds: myrbetriq 50mg daily   Anticoagulation: Yes - coumadin for hypercoagulable state and plavix    Objective:     Vitals:    07/16/20 1531   BP: (!) 163/73   Pulse: 60     Physical Exam:  General:WDWN in NAD  Neurologic: CN grossly normal. Normal sensation.   Psychiatric: awake, alert and oriented x 3. Mood and affect normal. Cooperative.  Eyes: PERRLA, normal conjunctiva  Respiratory: no increased work on breathing. No wheezing.   Cardiovascular: No obvious extremity edema. Warm and well perfused.  GI: no obvious stomach distension  Musculoskeletal: normal range of motion of bilateral upper extremities. Normal muscle strength and tone.  Skin: no obvious rashes or lesions. No tightening of skin noted.          Labs:  Recent Labs   Lab 05/20/20  1406 06/02/20  2101 06/25/20  0829   WBC 8.02 6.60 7.70   Hemoglobin 10.9 L 10.3 L 10.8 L   Hematocrit 34.5 L 30.9 L 32.5 L   Platelets 216 210 190   ]  Recent Labs   Lab 09/30/19  0955  01/03/20  1551 02/13/20  0913 05/20/20  1406 05/25/20  0926 06/02/20  2101 06/25/20  0829   Sodium 140   < > 142 144 141 140 143 140   Potassium 5.0   < > 4.9 5.0 5.4 H 5.0 5.0 4.8   Chloride 108   < > 110 112 H 115 H 111 119 H 110   CO2 27   < > 23 23 17 L 20 L 19 L 22 L   BUN, Bld 35 H   < > 34 H 36 H 44 H  54 H 50 H 31 H   Creatinine 1.6 H   < > 1.6 H 1.9 H 1.9 H 2.3 H 2.1 H 1.6 H   Glucose 84   < > 101 120 H 150 H 130 H 109 153 H   Calcium 9.0   < > 9.0 9.3 9.5 8.8 9.1 9.0   Magnesium 1.9  --   --   --   --  1.9  --  1.9   Phosphorus 3.3  3.3  --  3.3  --   --   --   --  3.0   Alkaline Phosphatase  --    < >  --  80 93  --  75  --    Total Protein  --    < >  --  6.8 6.9  --  6.4  --    Albumin 3.4 L   < > 3.2 L 3.2 L 3.1 L  --  3.2 L 3.3 L   Total Bilirubin  --    < >  --  0.6 0.4  --  0.6  --    AST  --    < >  --  25 23  --  21  --    ALT  --    < >  --  20 18  --  18  --     < > = values in this interval not displayed.   ]    Lab Results   Component Value Date    LABA1C 6.6 (H) 08/08/2016    HGBA1C 6.0 (H) 05/20/2020       Path: see hpi     LEFT    RIGHT  BASE   3+3 (1/2),  4+3 (1/2)     MID    4+4 (1/2)    b9  APEX    b9      b9   Date of Biopsy: 5/23/16  PSA: 7.7  Volume: 49.6  Prostate nodule: no    Rads: see hpi  rbus 12/2/19  Right kidney: The right kidney measures 16.4 cm. Mild cortical thinning is noted.  No loss of corticomedullary distinction. Resistive index measures 0.81.  Persistent renal cysts are seen throughout the kidney.  The largest in the upper pole measures 5.8 x 5.6 cm.  There is a lower pole complex cyst with internal echoes measuring 4.7 x 5.0 cm.  These are not adversely changed.  No abnormal calcifications no hydronephrosis.    Left kidney: The left kidney measures 13.7 cm. Mild cortical thinning.  No loss of corticomedullary distinction. Resistive index measures 0.79.  Multiple renal cysts are again identified.  The largest measures 6.2 x 4.3 x 6.6 cm in the midpole.  There is an upper pole 3.2 cm cyst in the lower pole 3.0 cm cyst.  These are simple in nature.  No solid mass.  No renal stone. No hydronephrosis.    The bladder is partially distended at the time of scanning and has an unremarkable appearance.    rbus 1/22/19  1. Sequela of chronic medical renal disease.  2. Two complex  lesions at the lower pole right kidney.  The larger 4.9cm is unchanged.  The smaller has increased in size, now measuring 3.2 cm.  3. Multiple additional simple renal cysts bilaterally.  4. Cholelithiasis.    DEXA bone density done for ADT 1/12/18: Normal bone mineral density of the of the lumbar spine.    1/18/17 rbus  Multiple bilateral renal cysts  4 right renal hemhorragic cysts - 1.8 cm, 6.8 cm, 4.5 cm and 1.6     6/14/16   ctap   Bilateral renal masses  No significant adenopathy  8cm complex fluid collection    Bone scan 6/14/16  Negative    Assessment:       1. Prostate cancer    2. OAB (overactive bladder)    3. Renal cyst, native, hemorrhage        Plan:     · Continue myrbetriq 50mg (has refills until 3/2020)  · Recent psa was 0.1, needs psa every 6 months until October 2021 (completed radiation in 2016). Then psa yearly for rest of life. psa in 6 months for now.   · Recent rbus showed RLP complex cyst smaller than before, continue to monitor with another rbus in 6 months.  · rbus also showed left 1.1cm kidney stone. If next rbus shows the same will order a ct renal stone study.    F/u 6 months with psa diagnostic and rbus prior.

## 2020-07-20 ENCOUNTER — OFFICE VISIT (OUTPATIENT)
Dept: PAIN MEDICINE | Facility: CLINIC | Age: 75
End: 2020-07-20
Payer: MEDICARE

## 2020-07-20 VITALS
WEIGHT: 288.56 LBS | SYSTOLIC BLOOD PRESSURE: 137 MMHG | DIASTOLIC BLOOD PRESSURE: 73 MMHG | HEART RATE: 55 BPM | RESPIRATION RATE: 16 BRPM | BODY MASS INDEX: 42.74 KG/M2 | HEIGHT: 69 IN

## 2020-07-20 DIAGNOSIS — M48.00 CENTRAL STENOSIS OF SPINAL CANAL: ICD-10-CM

## 2020-07-20 DIAGNOSIS — M47.896 OTHER SPONDYLOSIS, LUMBAR REGION: ICD-10-CM

## 2020-07-20 DIAGNOSIS — M51.36 DEGENERATIVE DISC DISEASE, LUMBAR: Primary | ICD-10-CM

## 2020-07-20 PROCEDURE — 3075F SYST BP GE 130 - 139MM HG: CPT | Mod: CPTII,S$GLB,, | Performed by: ANESTHESIOLOGY

## 2020-07-20 PROCEDURE — 3008F BODY MASS INDEX DOCD: CPT | Mod: CPTII,S$GLB,, | Performed by: ANESTHESIOLOGY

## 2020-07-20 PROCEDURE — 1101F PT FALLS ASSESS-DOCD LE1/YR: CPT | Mod: CPTII,S$GLB,, | Performed by: ANESTHESIOLOGY

## 2020-07-20 PROCEDURE — 99999 PR PBB SHADOW E&M-EST. PATIENT-LVL V: ICD-10-PCS | Mod: PBBFAC,,, | Performed by: ANESTHESIOLOGY

## 2020-07-20 PROCEDURE — 3078F DIAST BP <80 MM HG: CPT | Mod: CPTII,S$GLB,, | Performed by: ANESTHESIOLOGY

## 2020-07-20 PROCEDURE — 1101F PR PT FALLS ASSESS DOC 0-1 FALLS W/OUT INJ PAST YR: ICD-10-PCS | Mod: CPTII,S$GLB,, | Performed by: ANESTHESIOLOGY

## 2020-07-20 PROCEDURE — 3078F PR MOST RECENT DIASTOLIC BLOOD PRESSURE < 80 MM HG: ICD-10-PCS | Mod: CPTII,S$GLB,, | Performed by: ANESTHESIOLOGY

## 2020-07-20 PROCEDURE — 1125F AMNT PAIN NOTED PAIN PRSNT: CPT | Mod: S$GLB,,, | Performed by: ANESTHESIOLOGY

## 2020-07-20 PROCEDURE — 1159F PR MEDICATION LIST DOCUMENTED IN MEDICAL RECORD: ICD-10-PCS | Mod: S$GLB,,, | Performed by: ANESTHESIOLOGY

## 2020-07-20 PROCEDURE — 3075F PR MOST RECENT SYSTOLIC BLOOD PRESS GE 130-139MM HG: ICD-10-PCS | Mod: CPTII,S$GLB,, | Performed by: ANESTHESIOLOGY

## 2020-07-20 PROCEDURE — 99214 PR OFFICE/OUTPT VISIT, EST, LEVL IV, 30-39 MIN: ICD-10-PCS | Mod: S$GLB,,, | Performed by: ANESTHESIOLOGY

## 2020-07-20 PROCEDURE — 99999 PR PBB SHADOW E&M-EST. PATIENT-LVL V: CPT | Mod: PBBFAC,,, | Performed by: ANESTHESIOLOGY

## 2020-07-20 PROCEDURE — 1125F PR PAIN SEVERITY QUANTIFIED, PAIN PRESENT: ICD-10-PCS | Mod: S$GLB,,, | Performed by: ANESTHESIOLOGY

## 2020-07-20 PROCEDURE — 3008F PR BODY MASS INDEX (BMI) DOCUMENTED: ICD-10-PCS | Mod: CPTII,S$GLB,, | Performed by: ANESTHESIOLOGY

## 2020-07-20 PROCEDURE — 99214 OFFICE O/P EST MOD 30 MIN: CPT | Mod: S$GLB,,, | Performed by: ANESTHESIOLOGY

## 2020-07-20 PROCEDURE — 1159F MED LIST DOCD IN RCRD: CPT | Mod: S$GLB,,, | Performed by: ANESTHESIOLOGY

## 2020-07-20 RX ORDER — TRAMADOL HYDROCHLORIDE 50 MG/1
50 TABLET ORAL EVERY 8 HOURS PRN
Qty: 90 TABLET | Refills: 0 | Status: ON HOLD | OUTPATIENT
Start: 2020-07-20 | End: 2020-11-17

## 2020-07-20 NOTE — PROGRESS NOTES
FOLLOW UP NOTE:     CHIEF COMPLAINT: back pain    INITIAL HISTORY OF PRESENT ILLNESS: Richard Bustos is a 74 y.o. male with PMH significant for CAD s/p PCI (ASA and Plavix), atrial fibrllation on coumadin, hx of bilateral hip replacements, CKD, HTN, and DORA presents for the evaluation of low back pain. Patient was last seen in the [facility] in 8/2017 where he received a bilateral L3, L4, and L5 RFA with great benefit. The patient presents today reporting of a recurrence of his low back pain that was well treated with last RFA. Patient localizes his pain to the center of his lower back without radiation. Patient describes his pain as an aching, burning, and throbbing type of pain. Patient reports that his current pain is a 7/10. Patient reports that his low back pain makes it difficult for him to ambulate. Patient denies of any urinary/fecal incontinence, saddle anesthesia, or weakness. Patient reports of requiring admission to the hospital for heat exhaustion but denies of any other significant interval changes in his health since his RFA.      Aggravating factors: worse in the AM, standing, and walking     Mitigating factors: RFA     Relevant Surgeries: yes; hx of bilateral hip replacements     INTERVAL HISTORY OF PRESENT ILLNESS: Richard Bustos is a 74 y.o. male with PMH significant for CAD s/p PCI (ASA and Plavix), atrial fibrllation on coumadin, hx of bilateral hip replacements, CKD, HTN, and DORA presents as an established patient for the continued management of low back pain. The patient is s/p L5-S1 lumbar interlaminar epidural steroid injection on 6/22/2020, and he reports of at least 50% relief. The patient reports that his current pain is a 2/10. The patient reports of pain with walking. The patient reports that his pain is tolerable at this point in time. Patient denies of any urinary/fecal incontinence, saddle anesthesia, or weakness.     INTERVENTIONAL PAIN HISTORY:  6/22/2020: L5-S1  "lumbar interlaminar epidural steroid injection - at least 50% relief  2/17/2020: Left knee intra-articular knee injection - good relief  1/10/2020: Radiofrequency ablation of the L3, L4, L5 medial branch nerves on the bilateral-side - 50% relief  8/3/2017: RFA at L3, 4, 5 - reports 80% relief    CURRENT PAIN MEDICATIONS:   Tramadol 50 mg PO q daily for breakthrough pain    ROS:  Review of Systems   Constitutional: Negative for chills and fever.   HENT: Negative for tinnitus.    Eyes: Negative for visual disturbance.   Respiratory: Negative for shortness of breath.    Cardiovascular: Negative for chest pain.   Gastrointestinal: Negative for nausea and vomiting.   Genitourinary: Negative for difficulty urinating.   Musculoskeletal: Positive for back pain.   Skin: Negative for rash.   Allergic/Immunologic: Negative for immunocompromised state.   Neurological: Negative for syncope.   Hematological: Does not bruise/bleed easily.   Psychiatric/Behavioral: Negative for suicidal ideas.        MEDICAL, SURGICAL, FAMILY, SOCIAL HX: reviewed    MEDICATIONS/ALLERGIES: reviewed    PHYSICAL EXAM:    VITALS: Vitals reviewed.   Vitals:    07/20/20 0944   BP: 137/73   Pulse: (!) 55   Resp: 16   Weight: 130.9 kg (288 lb 9.3 oz)   Height: 5' 9" (1.753 m)   PainSc:   3   PainLoc: Leg       Physical Exam   Constitutional: He is oriented to person, place, and time and well-developed, well-nourished, and in no distress.   HENT:   Head: Normocephalic and atraumatic.   Eyes: Conjunctivae and EOM are normal. Right eye exhibits no discharge. Left eye exhibits no discharge.   Cardiovascular: Normal rate.   Pulmonary/Chest: Effort normal and breath sounds normal. No respiratory distress.   Abdominal: Soft.   Neurological: He is alert and oriented to person, place, and time.   Skin: Skin is warm and dry. No rash noted. He is not diaphoretic.   Psychiatric: Mood, memory, affect and judgment normal.   Nursing note and vitals reviewed.     UPPER " EXTREMITIES: Normal alignment, normal range of motion, no atrophy, no skin changes,  hair growth and nail growth normal and equal bilaterally. No swelling, no tenderness.    LOWER EXTREMITIES:  Normal alignment, normal range of motion, no atrophy, no skin changes,  hair growth and nail growth normal and equal bilaterally. No swelling, no tenderness.     LUMBAR SPINE  Lumbar spine: ROM is significantly limited with flexion extension and oblique extension with increased pain.    Supine straight leg raise: unable to perform secondary to fall risk   ((+)) Facet loading bilaterally  Internal and external rotation of the hips: unable to perform secondary to fall risk  Myofascial exam: No tenderness to palpation across lumbar paraspinous muscles.     MOTOR: Tone and bulk: normal bilateral upper and lower Strength: normal   Delt      Bi         Tri        WE      WF                        R          5          5          5          5          5          5            L          5          5          5          5          5          5               IP         ADD     ABD     Quad   TA        Gas      HAM  R          5          5          5          5          5          5          5  L          5          5          5          5          5          5          5     SENSATION: Light touch and pinprick intact bilaterally  REFLEXES: normal, symmetric, nonbrisk.  Toes down, no clonus. Negative roca's sign bilaterally.  GAIT: normal rise, base, steps, and arm swing.      IMAGING: no new imaging to review    ASSESSMENT: Richard Bustos is a 74 y.o. male with PMH significant for CAD s/p PCI (ASA and Plavix), atrial fibrllation on coumadin, hx of bilateral hip replacements, CKD, HTN, and DORA presents as an established patient for the continued management of low back pain. The patient is s/p L5-S1 lumbar interlaminar epidural steroid injection on 6/22/2020, and he reports of at least 50% relief. Patient reports that his pain is  tolerable with his most recent STARLA + RFA. Treatment plan outlined below.     PLAN:  1. Refilled Tramadol 50 mg PO q 8 hr PRN for breakthrough pain; #90 tablets; 0 refills.  reviewed without discrepancies.   2. Can repeat STARLA/RFA PRN in the future  3. I have stressed the importance of physical activity and a home exercise plan to help with chronic pain and improve health.  4. RTC in 2 months    Evangelist Bose MD  Pain Management

## 2020-07-22 ENCOUNTER — OFFICE VISIT (OUTPATIENT)
Dept: CARDIOLOGY | Facility: CLINIC | Age: 75
End: 2020-07-22
Payer: MEDICARE

## 2020-07-22 VITALS
HEIGHT: 69 IN | DIASTOLIC BLOOD PRESSURE: 87 MMHG | WEIGHT: 290.38 LBS | BODY MASS INDEX: 43.01 KG/M2 | HEART RATE: 64 BPM | OXYGEN SATURATION: 93 % | SYSTOLIC BLOOD PRESSURE: 159 MMHG

## 2020-07-22 DIAGNOSIS — E88.09 HYPOALBUMINEMIA: ICD-10-CM

## 2020-07-22 DIAGNOSIS — N18.30 CKD (CHRONIC KIDNEY DISEASE) STAGE 3, GFR 30-59 ML/MIN: ICD-10-CM

## 2020-07-22 DIAGNOSIS — I73.9 PERIPHERAL VASCULAR DISEASE: ICD-10-CM

## 2020-07-22 DIAGNOSIS — Z95.5 STATUS POST INSERTION OF DRUG ELUTING CORONARY ARTERY STENT: ICD-10-CM

## 2020-07-22 DIAGNOSIS — I15.2 HYPERTENSION ASSOCIATED WITH DIABETES: ICD-10-CM

## 2020-07-22 DIAGNOSIS — I65.23 BILATERAL CAROTID ARTERY STENOSIS: Chronic | ICD-10-CM

## 2020-07-22 DIAGNOSIS — G47.33 OBSTRUCTIVE SLEEP APNEA SYNDROME: Chronic | ICD-10-CM

## 2020-07-22 DIAGNOSIS — I71.20 THORACIC AORTIC ANEURYSM WITHOUT RUPTURE: Chronic | ICD-10-CM

## 2020-07-22 DIAGNOSIS — I42.9 CARDIOMYOPATHY, UNSPECIFIED TYPE: ICD-10-CM

## 2020-07-22 DIAGNOSIS — E78.2 MIXED HYPERLIPIDEMIA: ICD-10-CM

## 2020-07-22 DIAGNOSIS — D68.59 HYPERCOAGULABLE STATE: ICD-10-CM

## 2020-07-22 DIAGNOSIS — M54.40 BACK PAIN OF LUMBOSACRAL REGION WITH SCIATICA: ICD-10-CM

## 2020-07-22 DIAGNOSIS — I25.2 HISTORY OF MI (MYOCARDIAL INFARCTION): ICD-10-CM

## 2020-07-22 DIAGNOSIS — I48.0 PAF (PAROXYSMAL ATRIAL FIBRILLATION): Primary | ICD-10-CM

## 2020-07-22 DIAGNOSIS — E11.59 HYPERTENSION ASSOCIATED WITH DIABETES: ICD-10-CM

## 2020-07-22 DIAGNOSIS — G31.84 MCI (MILD COGNITIVE IMPAIRMENT): ICD-10-CM

## 2020-07-22 DIAGNOSIS — R79.89 ELEVATED BRAIN NATRIURETIC PEPTIDE (BNP) LEVEL: ICD-10-CM

## 2020-07-22 DIAGNOSIS — I11.9 HYPERTENSIVE LEFT VENTRICULAR HYPERTROPHY, WITHOUT HEART FAILURE: ICD-10-CM

## 2020-07-22 DIAGNOSIS — Z79.01 LONG TERM (CURRENT) USE OF ANTICOAGULANTS: ICD-10-CM

## 2020-07-22 DIAGNOSIS — I48.0 PAROXYSMAL ATRIAL FIBRILLATION: ICD-10-CM

## 2020-07-22 DIAGNOSIS — D63.8 ANEMIA, CHRONIC DISEASE: ICD-10-CM

## 2020-07-22 DIAGNOSIS — I50.22 CHRONIC SYSTOLIC CONGESTIVE HEART FAILURE: ICD-10-CM

## 2020-07-22 DIAGNOSIS — E66.01 MORBID OBESITY: ICD-10-CM

## 2020-07-22 DIAGNOSIS — M51.36 DEGENERATIVE DISC DISEASE, LUMBAR: Primary | ICD-10-CM

## 2020-07-22 DIAGNOSIS — I48.0 PAF (PAROXYSMAL ATRIAL FIBRILLATION): ICD-10-CM

## 2020-07-22 PROCEDURE — 3008F PR BODY MASS INDEX (BMI) DOCUMENTED: ICD-10-PCS | Mod: CPTII,S$GLB,, | Performed by: INTERNAL MEDICINE

## 2020-07-22 PROCEDURE — 3079F PR MOST RECENT DIASTOLIC BLOOD PRESSURE 80-89 MM HG: ICD-10-PCS | Mod: CPTII,S$GLB,, | Performed by: INTERNAL MEDICINE

## 2020-07-22 PROCEDURE — 1125F PR PAIN SEVERITY QUANTIFIED, PAIN PRESENT: ICD-10-PCS | Mod: S$GLB,,, | Performed by: INTERNAL MEDICINE

## 2020-07-22 PROCEDURE — 1159F MED LIST DOCD IN RCRD: CPT | Mod: S$GLB,,, | Performed by: INTERNAL MEDICINE

## 2020-07-22 PROCEDURE — 99999 PR PBB SHADOW E&M-EST. PATIENT-LVL III: ICD-10-PCS | Mod: PBBFAC,,, | Performed by: INTERNAL MEDICINE

## 2020-07-22 PROCEDURE — 3008F BODY MASS INDEX DOCD: CPT | Mod: CPTII,S$GLB,, | Performed by: INTERNAL MEDICINE

## 2020-07-22 PROCEDURE — 3044F HG A1C LEVEL LT 7.0%: CPT | Mod: CPTII,S$GLB,, | Performed by: INTERNAL MEDICINE

## 2020-07-22 PROCEDURE — 99999 PR PBB SHADOW E&M-EST. PATIENT-LVL III: CPT | Mod: PBBFAC,,, | Performed by: INTERNAL MEDICINE

## 2020-07-22 PROCEDURE — 1159F PR MEDICATION LIST DOCUMENTED IN MEDICAL RECORD: ICD-10-PCS | Mod: S$GLB,,, | Performed by: INTERNAL MEDICINE

## 2020-07-22 PROCEDURE — 99499 UNLISTED E&M SERVICE: CPT | Mod: S$GLB,,, | Performed by: INTERNAL MEDICINE

## 2020-07-22 PROCEDURE — 99499 RISK ADDL DX/OHS AUDIT: ICD-10-PCS | Mod: S$GLB,,, | Performed by: INTERNAL MEDICINE

## 2020-07-22 PROCEDURE — 93000 EKG 12-LEAD: ICD-10-PCS | Mod: S$GLB,,, | Performed by: INTERNAL MEDICINE

## 2020-07-22 PROCEDURE — 93000 ELECTROCARDIOGRAM COMPLETE: CPT | Mod: S$GLB,,, | Performed by: INTERNAL MEDICINE

## 2020-07-22 PROCEDURE — 3044F PR MOST RECENT HEMOGLOBIN A1C LEVEL <7.0%: ICD-10-PCS | Mod: CPTII,S$GLB,, | Performed by: INTERNAL MEDICINE

## 2020-07-22 PROCEDURE — 1101F PR PT FALLS ASSESS DOC 0-1 FALLS W/OUT INJ PAST YR: ICD-10-PCS | Mod: CPTII,S$GLB,, | Performed by: INTERNAL MEDICINE

## 2020-07-22 PROCEDURE — 3077F PR MOST RECENT SYSTOLIC BLOOD PRESSURE >= 140 MM HG: ICD-10-PCS | Mod: CPTII,S$GLB,, | Performed by: INTERNAL MEDICINE

## 2020-07-22 PROCEDURE — 1125F AMNT PAIN NOTED PAIN PRSNT: CPT | Mod: S$GLB,,, | Performed by: INTERNAL MEDICINE

## 2020-07-22 PROCEDURE — 1101F PT FALLS ASSESS-DOCD LE1/YR: CPT | Mod: CPTII,S$GLB,, | Performed by: INTERNAL MEDICINE

## 2020-07-22 PROCEDURE — 99214 OFFICE O/P EST MOD 30 MIN: CPT | Mod: S$GLB,,, | Performed by: INTERNAL MEDICINE

## 2020-07-22 PROCEDURE — 99214 PR OFFICE/OUTPT VISIT, EST, LEVL IV, 30-39 MIN: ICD-10-PCS | Mod: S$GLB,,, | Performed by: INTERNAL MEDICINE

## 2020-07-22 PROCEDURE — 3079F DIAST BP 80-89 MM HG: CPT | Mod: CPTII,S$GLB,, | Performed by: INTERNAL MEDICINE

## 2020-07-22 PROCEDURE — 3077F SYST BP >= 140 MM HG: CPT | Mod: CPTII,S$GLB,, | Performed by: INTERNAL MEDICINE

## 2020-07-22 NOTE — PATIENT INSTRUCTIONS

## 2020-07-22 NOTE — PROGRESS NOTES
" Patient ID:  Richard Bustos is a 74 y.o. male who presents for {Misc; evaluation/follow-up:77560::"follow-up"} of No chief complaint on file.      Health literacy: {DESC; LOW/MEDIUM/HIGH:97986} medium  Activities: none  Nicotine: quit at age 19  Alcohol: none  Illicit drugs: none  Cardiac symptoms: none  Home BP: do not check  Medication compliance: yes  Diet: regular, diabetic, Mediterranean regular  Caffeine: 2 cpd  Labs:   Last Echo: 2020  Last stress test: 2017  Cardiovascular angiogram:   EC2020  Fundoscopic exam:     No flowsheet data found.      HPI    ROS     Objective:    Physical Exam      Assessment:       No diagnosis found.     Plan:         There are no diagnoses linked to this encounter.              "

## 2020-07-22 NOTE — PROGRESS NOTES
Patient ID: Richard Bustos is a 69 y.o. male who presents for follow-up of Follow-up  For multiple medical morbidities, HFpEF, 6-months follow up  PCP: Dr. Ramirez, see every 4 months, does labs through [facility] including lipid  Renal: Dr. Reyes  Hematologist: Dr. Quezada  Urologist: Dr. Moss  Podiatrist: Dr. Nunes, [place]  Wound care: Dr. Torrez  Pain: Dr. 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, officially retired, still manages 6 rentals, lots of emotional stress    Health literacy: medium   Vaccinations: up-to-date  Activities: more active, now walking about quarter mile daily, takes about 20 minutes,  no problem except some stiffness, also limited by the hips and CLBP  Nicotine: quit at age 19 after 4 packs  Alcohol: none  Illicit drugs: none   Cardiac symptoms: none  Home BP: OK, daughter MANUELA Godwin check it, 128 to 130  Medication compliance: yes  Diet: regular  Caffeine: 1 cpd  Labs: 8/2018, LDL 50.8, on 80 mg of atorvastatin, 7/2019 CMP (Cr 1.8, eGFR 37)  10/2019 LDL 51, Cr 1.7 eGFR 39.4, K+ 3.5, up to 5.7 in hospital,  improve from 419 on 10/6/2018, negative troponin, Hgb 8.8  1/2020 BUN 34, Scr 1.6, eGFR 42.4, albumin 3.2, iron sat 31%, have proteinuria, INR 2  Lab Results   Component Value Date    TSH 1.044 05/20/2020        Lab Results   Component Value Date    LABA1C 6.6 (H) 08/08/2016    HGBA1C 6.0 (H) 05/20/2020       Lab Results   Component Value Date    WBC 7.70 06/25/2020    HGB 10.8 (L) 06/25/2020    HCT 32.5 (L) 06/25/2020     (H) 06/25/2020     06/25/2020       CMP  Sodium   Date Value Ref Range Status   06/25/2020 140 136 - 145 mmol/L Final     Potassium   Date Value Ref Range Status   06/25/2020 4.8 3.5 - 5.1 mmol/L Final     Chloride    Date Value Ref Range Status   06/25/2020 110 101 - 111 mmol/L Final     CO2   Date Value Ref Range Status   06/25/2020 22 (L) 23 - 29 mmol/L Final     Glucose   Date Value Ref Range Status   06/25/2020 153 (H) 74 - 118 mg/dL Final     BUN, Bld   Date Value Ref Range Status   06/25/2020 31 (H) 8 - 23 mg/dL Final     Creatinine   Date Value Ref Range Status   06/25/2020 1.6 (H) 0.5 - 1.4 mg/dL Final     Calcium   Date Value Ref Range Status   06/25/2020 9.0 8.6 - 10.0 mg/dL Final     Total Protein   Date Value Ref Range Status   06/02/2020 6.4 6.0 - 8.4 g/dL Final     Albumin   Date Value Ref Range Status   06/25/2020 3.3 (L) 3.5 - 5.2 g/dL Final     Total Bilirubin   Date Value Ref Range Status   06/02/2020 0.6 0.3 - 1.2 mg/dL Final     Comment:     For infants and newborns, interpretation of results should be based  on gestational age, weight and in agreement with clinical  observations.  Premature Infant recommended reference ranges:  Up to 24 hours.............<8.0 mg/dL  Up to 48 hours............<12.0 mg/dL  3-5 days..................<15.0 mg/dL  6-29 days.................<15.0 mg/dL       Alkaline Phosphatase   Date Value Ref Range Status   06/02/2020 75 38 - 126 U/L Final     AST   Date Value Ref Range Status   06/02/2020 21 15 - 41 U/L Final     ALT   Date Value Ref Range Status   06/02/2020 18 17 - 63 U/L Final     Anion Gap   Date Value Ref Range Status   06/25/2020 8 8 - 16 mmol/L Final     eGFR if    Date Value Ref Range Status   06/25/2020 49.0 (A) >60 mL/min/1.73 m^2 Final     eGFR if non    Date Value Ref Range Status   06/25/2020 42.4 (A) >60 mL/min/1.73 m^2 Final     Comment:     Calculation used to obtain the estimated glomerular filtration  rate (eGFR) is the CKD-EPI equation.        @labrcntip(troponini)@    BNP   Date Value Ref Range Status   11/13/2019 233 (H) 0 - 99 pg/mL Final     Comment:     Values of less than 100 pg/ml are consistent with non-CHF  populations.   }   Lab Results   Component Value Date    CHOL 114 08/16/2019    CHOL 113 (L) 08/13/2018    CHOL 117 (L) 09/19/2017     Lab Results   Component Value Date    HDL 41 08/16/2019    HDL 42 08/13/2018    HDL 42 09/19/2017     Lab Results   Component Value Date    LDLCALC 51 08/16/2019    LDLCALC 50.8 (L) 08/13/2018    LDLCALC 51.4 (L) 09/19/2017     Lab Results   Component Value Date    TRIG 111 08/16/2019    TRIG 101 08/13/2018    TRIG 118 09/19/2017     Lab Results   Component Value Date    CHOLHDL 36.0 08/16/2019    CHOLHDL 37.2 08/13/2018    CHOLHDL 35.9 09/19/2017        Last Echo: 5/2020  Last stress test: 1/2017, lexiscan  Cardiovascular angiogram: 1/2015 with PCI  ECG: AF, 62, RBBB  Fundoscopic exam: within the past year, negative for HTN changes    In 2/2016:  White male with multiple medical problems (see List). Suffered a NSTEMI in 8/2010, catheterization showed SUMMARY:   1. Three vessel coronary artery disease.   2. Successful PCI. Involved 2 JAYCEE in the LAD and OM1.    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 Off diet at times, had underwent gastri bypass in 2008.    Last Echo in 2010 showed CONCLUSIONS   1 - Mild left ventricular enlargement.   2 - Normal left ventricular function (EF 58%).   3 - Diastolic dysfunction.   4 - Biatrial enlargement.   5 - Mildly to moderately enlarged ascending aorta.    Last carotid US: 9/13/2010  Bilateral 20%-40% narrowing.  No symptoms.    Coronary Artery Disease  Presents for follow-up visit. Pertinent negatives include no chest pain, dizziness, leg swelling, muscle weakness, palpitations, shortness of breath or weight gain. Risk factors include hyperlipidemia. The symptoms have been resolved. Compliance with diet is variable. Compliance with exercise is good. Compliance with medications is good.   Hyperlipidemia  Pertinent negatives include no chest pain, focal weakness, myalgias or  "shortness of breath.     EKG shows sinus bradycardia, rate 45, today NSR, 75, VPC, and nonspecific STTW abnormalities.     Since visit of 7/2012, have lots of stress with tenants, BP noted to be elevated, log reviewed 153-174/, HR 52-74. Had cardiac testing -   ECHO CONCLUSIONS 7/2012   1 - Mild left ventricular enlargement.   2 - Mildly to moderately depressed left ventricular function (EF 40%).   3 - Eccentric hypertrophy.   4 - Mildly to moderately enlarged ascending aorta.   5 - Mild left atrial enlargement.   6 - Normal diastolic function.   7 - Mild aortic regurgitation.   8 - Suggestion for inferoposterior hypokinesia.   9 - Compare to 8/17/2010, there is increase in LVH with decrease in   LVEF from 58%, and new inferoposterior hypokinesia. The Aortic dimensions   have not changed.    Carotid US, 7/2012  IMPRESSION   Findings consistent with less than 50% diameter stenosis of proximal   internal carotid arteries by NASCAET criteria. Flow in the vertebral   arteries appears antegrade bilaterally.    Since visit of 1/23, still with occasional angina, average once a week, lasting about a minute, "light" grade 3-4/10.  Workup:  ECHO, 2/5/20163, CONCLUSIONS   1 - Mild left ventricular enlargement.   2 - Mildly to moderately depressed left ventricular function (EF 40%).   3 - Normal diastolic function.   4 - Inferoposterior hypokinesia consistent with ischemic disease..   5 - Moderately enlarged ascending aorta. 4.4 cm - stable   6 - No significant change from study of 7/17/2012.    Lexiscan, 2/5/2013  Nuclear Quantitative Functional Analysis:   LVEF: 34 % (normal is 47 - 59)   LVED Volume: 131 ml (normal is 91 - 155)   LVES Volume: 87 ml (normal is 40 - 78)   Impression: ABNORMAL MYOCARDIAL PERFUSION   1. There is evidence for mild to moderate myocardial ischemia in the   lateral apical wall of the left ventricle with associated stress induced   LV cavity dilatation with underlying injury present.   2. There " is moderate intensity fixed defect in the inferolateral wall of   the left ventricle, consistent with myocardial injury. There is trivial   francine-injury ischemia.   3. There is abnormal wall motion at rest showing akinesis of the lateral   wall of the left ventricle, moderate global hypokinesis of the left   ventricle. dyskinesis of the inferolateral wall of the left ventricle.   4. There is resting LV dysfunction with a reduced ejection fraction of 34   %. (normal is 47 - 59)   5. The left ventricular volume is mildly increased at rest.   6. The extracardiac distribution of radioactivity is normal.  7. [facility] SSS =15, SRS =10, SDS =5, suggest 6.25% of myocardium may be   ischemic.    Since visit of 2/8, noticed some bilateral leg weakness, post bilateral THR, also occasional charley horse at night, mostly right sided. Today had mild chest pain, grade 2/10, while driving here. Claims to have not heard from [facility], INR on 2/18 was 1.8. Question about Plavix answered. Discussed with daughter, Citlali, over the telephone.     Since visit 2/22, no new problem. Discussed aortic aneurysm, will need at least annual imaging, do not recall the last time. Have claustrophobia requesting Xanax. No problem with the medications, stays very active. Weight reviewed was below 290 lbs in 12/2011.   Apparently likes Arcadio rincon!     Since visit of 3/16, had MRA done with use of Xanax and hydrocodone,   Result - Max ascending aorta 4.3 cm stable.  Tolerating medications without problem, using BiPAP nightly, still lot of stress with [employer]ental Properties. Discuss need for exercise program with weigh reduction of 10%. New problem include left leg weakness with localized pain behind the knee. For about 2 weeks, been dancing with new woman.    Since visit of 4/11/2013, hospitalized 2 weeks ago at [facility] for large hematoma due to use of Lovenox as bridge for oral surgery. Coumadin on hold, seems to be getting  smaller and softer. Denies any heart symptoms or CP nor SOB. No problem with taking medications and using CPAP. Slowed down due to leg pain and fatigue. Feels good in the AM.    Since visit of 4/9/2014, complain of leg weakness, noted since 2/2013. Denies any CP nor SOB. Miss 2-3 doses of medications monthly. No other problem with medications. Awaken all night due to dog, sleep 10-4. Feels tired in the morning despite using CPAP. Have not had much blood work done. ECG shows AF rate 67, RBBB.  ECHO in 5/2014 CONCLUSIONS   1 - Enlarged aortic root. measuring 3.9 cm at sinotubular junction.  2 - Biatrial enlargement.   3 - Eccentric hypertrophy.   4 - Mildly to moderately depressed left ventricular systolic function (EF 40-45%).   5 - Normal left ventricular diastolic function.   6 - Right ventricular enlargement with normal systolic function.   7 - No significant change from echo on 2/5/2013..   8 - Difficult apical window, Optison contrast used for wall motion analysis..     Since visit of 11/21, no new problem. Some concern of HR down to 40 after exercise. Noted easy fatigue but remains quite sedentary. Discussed need for Coreg titration for LV dysfunction. Labs reviewed, CKD eith eGFR of 47, mild anemia Hgb 12.1, , A1C 4.7, and proteinuria.   Cardiac workup:  Carotid US, 12/2014 - CONCLUSIONS   There is 0 - 19% right Internal Carotid stenosis.  There is 0 - 19% left Internal Carotid stenosis.    Clean vessels    Lexiscan Nuclear Quantitative Functional Analysis:   LVEF: 38 % (normal is 47 - 59)  LVED Volume: 172 ml (normal is 91 - 155)  LVES Volume: 107 ml (normal is 40 - 78)    Impression: ABNORMAL MYOCARDIAL PERFUSION  1. There is evidence for mild to moderate myocardial ischemia in the inferior and lateral walls of the left ventricle with associated stress induced LV cavity dilatation with underlying injury present.   2. There is moderate to severe intensity fixed defect in the inferolateral wall of the  left ventricle, consistent with myocardial injury.   3. There is abnormal wall motion at rest showing moderate global hypokinesis of the left ventricle. severe hypokinesis of the inferior and septal walls of the left ventricle.   4. There is resting LV dysfunction with a reduced ejection fraction of 38 %. (normal is 47 - 59)  5. The left ventricular volume is moderately increased at rest.   6. The extracardiac distribution of radioactivity is normal.   7. When compared to the previous study from 02/05/2013, no significant changes noted.  8. [facility] SSS=15, SRS=8, and SDS=7, suggest that 8.7% of myocardium may be ischemic.    Holter, 12/2014:  PREDOMINANT RHYTHM  1. Sinus arrhythmia with heart rates varying between 41 and 110 bpm with an average of 64 bpm.     VENTRICULAR ARRHYTHMIAS  1. There were frequent mostly monomorphic PVCs totalling 1297 and averaging 54 per hour. There was 1 couplet.    2. 1.6% of complexes.    3. There were no episodes of ventricular tachycardia.    SUPRA VENTRICULAR ARRHYTHMIAS  1. There were rare PACs totalling 108 and averaging 4 per hour.     2. There were no episodes of sustained supraventricular tachycardia.    SINUS NODE FUNCTION  1. There was no evidence of high grade SA sang block.     AV CONDUCTION  1. There was no evidence of high grade AV block.     DIARY  1. The diary was not returned    MISCELLANEOUS  1. There were persistent hookup related artifacts. Overall, the study was of good quality.     2. This was a tape of adequate length (24 hrs).     Since visit of 12/23/2014, underwent C with PCI of RCA. Off ASA due to rectal bleed. Discuss use of triple Rx for hopefully 3 months post JAYCEE implant. Feeling more energetic, no CP nor SOB. Discussed ascending Aortic aneurysm, last checked in 5/2014 with Echo - stable.   HEMODYNAMIC RESULTS:    LVEDP (Pre): 16 mmHg  LVEDP (Post): 18 mmHg  Ejection Fraction: 40%  Global LV Function: moderately depressed  BP: 109/70  HR: 96    Air  rest:  LVEDP: 18    C. ANGIOGRAPHIC RESULTS:    DIAGNOSTIC:  Patient has a right dominant coronary artery.     - Left Main Coronary Artery:  The LM is normal. There is DAREK 3 flow.    - Left Anterior Descending Artery:  The mid LAD has a 50% stenosis. There is DAREK 3 flow. The remaining portion of the vessel has multiple lesions < 50% stenosed. Long stent noted in mid-LAD, 50% ISR.    - Left Circumflex Artery:  The LCX has luminal irregularities. There is DAREK 3 flow.    - Right Coronary Artery:  The mid RCA has a 75% stenosis. There is DAREK 3 flow. The remaining portion of the vessel has luminal irregularities. Appears to be close to distal end of previously placed stent.    - Aortic Root:  The Aortic Root is normal. There is DAREK 3 flow.      D. SUMMARY:    1. Two vessel coronary artery disease.  2. Diastolic dysfunction.  3. - Very difficult case due to tortuosity of the innominate artery, multiple manipulation needed for cannulation of CA.  4. - RCA lesion appears as a large plaque with significant luminal stenois.    E. RECOMMENDATIONS:    1. Continue medical management.  2. Cardiac rehab referral.  3. Weight loss.  4. Follow up with Dr. Familia Tate.  5. Schedule for PCI.  6. - Hydration overnight, eGFR 47.  7. - Dr. Gallegos consulted for PCI of RCA  8. - On chronic warfarin Rx, on hold for now  9. - Start  mg today and daily for total of 5 days as warfarin is resumed post PCI  10. - Start Plavix, load with 300 mg today then 75 mg daily in addition to warfarin.    PCI INTERVENTION:    Proximal RCA:  The lesion was successfully intervened. Post-stenosis of 0% and post-DAREK 3 flow. The vessel was accessed natively. The following items were used: Stent Resolute Rx 4.00x30 (JAYCEE).    C. SUMMARY:    1. Successful PCI/JAYCEE of the proximal RCA.    D. RECOMMENDATIONS:    1. Routine post PCI care.  2. Risk factor reductions.  3. ASA 81mg.  4. Clopidogrel (Plavix) for one year.  5. Weight loss.    Since visit of  "1/22/2015, no CP nor SOB. Been compliant with medications, no problem. Noted venous ulcer in the left leg about a week ago. Dressing with peroxide. Lab from [facility] reviewed, K+ remains 5.5 with stable Cr of 1.78, eGFR 39. Active with rental properties upkeep.     Since visit of 3/27, no new problem, difficulties in getting lab results from [facility]. Reviewed eGFR 39, K+ 5.5, will need to stay with low K+ diet. Problem with venous stasis ulceration in the left leg. Worked with Wound Care for 2 weeks, told to return if problem.     Since visit of 4/23,no new problem, feeling "pretty good". In process of selling rental properties. Active with walking about 4 miles a day, takes about an hour. Legs better with ACE stocking. Review lab, BMP on 5/15/2015 K+ 4.7, stable renal function with eGFR 41, CBC in 1/2015 Hgb 11.8, last Ferritin level in 4/2011.  ECHO, 5/2015, CONCLUSIONS   1 - Moderately enlarged aortic root. measuring 4.1 cm at sinotubular junction.  2 - Concentric hypertrophy. Wall thickness is increased, with the septum and the posterior wall each measuring 1.4 cm across.  3 - Low normal to mildly depressed left ventricular systolic function (EF 50-55%).   4 - Normal left ventricular diastolic function.   5 - Right ventricular enlargement with normal systolic function.   6 - Difficult windows, Optison contrast used for wall motion analysis.   7 - Suggestion for some improvement in LVEF from Echo on 5/7/2014.     Since visit of 5/29/2015, more active, compliant with medications, PT twice weekly for an hour, no problem. Last BMP in 5/2015, K+ 4.7, Cr 1.7, eGFR 41. Under care of Dr. Torrez for venous stasis and ulcer. Uses support hose occasionally due to the hot weather.    Since visit of 9/10/2015, no new problem, less stress, no regular exercise. Had completed phase II Cardiac Rehab. Asked to consider phase III. Home BP is good, 135. Citlali fixes medications, don't skip.    Since visit of 2/8/2016, no new problem. " Undergoing urologic evaluation for kidney mass with biopsies of the bladder, prostate, and the right kidney, no problem with the procedure, awaiting results. ECG shows RBBB, no problem with medications, no regular exercise but considering to start. Decline stress test, no CP nor SOB.     In 12/2016, first concern is chronic fatigue, received 42 radiation Rx, have nocturia 3X nightly which interrupt the sleep, gets only about 5 hours. Will review with upcoming visit with Urologist. Stay active, able to walk 2 blocks with can, also uses free weights 2X weekly, for 15-20 minutes. Have DORA, uses CPAP 100%. For past 2 months had 2 episode of chest pain under emotional stress, last up to 15 minutes, max grade 3/10, have not use any NTG. Chart reviewed - last nuclear stress test in 12/2014. Had JAYCEE placed in 1/2015. Last lipid is excellent, LDL 57, non-HDL 75. Weight stable at around 294 lbs.   Echo, 6/2016 CONCLUSIONS     1 - Mild left ventricular enlargement.     2 - Eccentric hypertrophy.     3 - Moderately depressed left ventricular systolic function (EF 35-40%).     4 - Normal left ventricular diastolic function.     5 - Right ventricular enlargement with normal systolic function.     6 - The estimated PA systolic pressure is greater than 25 mmHg.     7 - Mild tricuspid regurgitation.     8 - Trivial to mild aortic regurgitation.     9 - Suggestion for deterioration in LVEF from Echo in 5/2015.     10 - Difficult windows, Optison contrast used for wall motion analysis.     Carotid US  Consider repeat study in six months.    CONCLUSIONS   There is 40 - 49% right Internal Carotid stenosis.  There is 20 - 39% left Internal Carotid stenosis.    Antegrade flows in the vertebral arteries. Homogenous plaques noted in both ICAs.    In 2/2017, active with rental, feels tired all the time, using CPAP 100%, wake up feeling all right then tired after 2-3 hours. Was using hydrocodone bid for chronic pains, out of medications for 3  weeks, feels more tired off the narcotic. Explained need for regular exercise with muscle strengthening.  Lexiscan - Nuclear Quantitative Functional Analysis:   LVEF: 42 % (normal is 47 - 59)  LVED Volume: 124 ml (normal is 91 - 155)  LVES Volume: 72 ml (normal is 40 - 78)    Impression: ABNORMAL MYOCARDIAL PERFUSION  1. There is moderate intensity fixed defects in the lateral and inferolateral walls of the left ventricle, consistent with myocardial injury. There is trivial francine-injury ischemia.   2. There is abnormal wall motion at rest showing moderate hypokinesis of the inferolateral and inferior walls of the left ventricle.   3. There is resting LV dysfunction with a reduced ejection fraction of 42 %.  (normal is 47 - 59)  4. The ventricular volumes are normal at rest and stress.   5. The extracardiac distribution of radioactivity is normal.   6. When compared to the previous study from 12/15/2014, the inferolateral defects now appears fixed.  7. [facility] SSS=15, SRS=14, and SDS=1, suggest that only 1% of myocardium may be ischemic.    Carotid US - CONCLUSIONS   There is 20 - 39% right Internal Carotid stenosis.  There is 0 - 19% left Internal Carotid stenosis.    Homogenous plaques noted in the ICAs, antegrade flows in the vertebral  arteries.    In 7/2017, here supposedly for HTN but not certain, also planning to do RF nerve ablation next Thursday with Dr. Causey, no surgical clearance requested. BP in PCP office was 112/60. Compliant with medications. Also have started using BiPAP every night for DORA, feel better. Denies any CP nor SOB. Active with property management and HA. ECG shows NSR, RBBB.     In 1/2018, no cardiac symptom, no heart worries. Very few home BP. Remain active using the cane, limited by loss of balance due to neuropathy of the LE. Fallen twice, due to the shoe, uses cane as needed. One episode, hit nose with bleeding. Labs reviewed from 9/2017: LDL 51.4, A1C 6.7%, anemia Hgb 9.8, CKD stage  "3, eGFR 43   Echo in 8/2017 - CONCLUSIONS     1 - Eccentric hypertrophy.     2 - Moderately depressed left ventricular systolic function (EF 35-40%).     3 - Regional wall motion analysis consistent with ischemic disease.     4 - Impaired LV relaxation, normal LAP (grade 1 diastolic dysfunction).     5 - Trivial to mild mitral regurgitation.     6 - Right ventricular enlargement with normal systolic function.     7 - The estimated PA systolic pressure is 26 mmHg.     8 - No definite change from Echo in 6/2016.     In 7/2018, occasional electricity in the heart, very brief. ECG in NSR, 62 bpm, PVC and RBBB. No CP, stay active dealing with a lot friends' problem, he is the problem solver, very stressful.  Carotid US 2/2017  CONCLUSIONS   There is 0 - 19% right Internal Carotid stenosis.  There is 0 - 19% left Internal Carotid stenosis.    Homogenous plaques in the ICAs with antegrade flows in the vertebral arteries.    Holter - Conclusion   · NSR with occasional mostly monomorphic PVC, 3000, about 2% of complexes, rare PAC  · No symptom reported        In 1/2019, return for 6 months review. No heart worries, no cardiac symptoms. Trying to be active, walking a problem with leg weakness and dizziness. Considered high fall risk. Fairly sedentary. LDL 8/2018, 50. Lots of agitation from the renters.   Echo 8/2018 - The left ventricle cavity is normal.  · Left atrium is moderately dilated.  · Tricuspid valve shows mild regurgitation.  · Left ventricle ejection fraction is low normal at 51%  · Normal LV diastolic function.  · RV systolic function is normal.  · Improved LVEF from Echo in 8/2017     Difficult windows, Lumason contrast used for wall motion analysis.    In 7/2019, here for 6 months review, remain pretty limited but no symptom. No heart worries.    In 10/2019, here for post DC review,   DCS "75 y/o male who has a PMH of CKD 3 , HTN,P  A. FIB , CHF with EF of 51%,, CAD, Gout , DORA, and Prostate cancer in the past " "who  presented with the above concerns. He has recently started on new medication Namenda. He had labs , UA, CT Head , and Chest X-ray. He was noted to have EMMY on CKD , Hyperkalemia , and concerns of foul smelling urine at home. He was admitted to the hospital for concerns of weakness, dizziness , and increase confusion for the last 2 days. He had  Hyperkalemia 5.7, and metabolic encephalopathy He failed outpatient treatment as he was seen in the ER day before admission, with negative work up.  still has hyperkalemia,started again on Kayexalate.improved,ARF on CKD 3 improved,did well with PT,OT and ar DC time HH is arranged,mental status was back to his baseline.  Patient has been discharged home with no ACE,or ARB duo to hyperkalemia and worsening CKD and follow up with PCP in next few days."    Daughter report elevated BP post DC, >150/76. Feeling fine.     HPI Comments: in 1/2020, here for 6-months review, more active with PT twice a week, limited by some leg weakness. No heart worries and denies any cardiac symptoms.   Echo 5/2020 - Mild concentric left ventricular hypertrophy.  · Moderately decreased left ventricular systolic function. The estimated ejection fraction is 40%.  · Grade I (mild) left ventricular diastolic dysfunction consistent with impaired relaxation.  · Local segmental wall motion abnormalities. There is inferobasilar dyskinesis.  · Mild left ventricular enlargement.  · Mild right ventricular enlargement.  · Mild right atrial enlargement.  · Mild aortic regurgitation.  · Mild tricuspid regurgitation.       Review of Systems   Constitution: Negative for diaphoresis, fever, some weakness, and malaise/fatigue, no night sweats and have weight stability since 1/2020  HENT: Negative for headaches, nosebleeds and tinnitus.   Eyes: Negative for visual disturbance. Have watery eyes, photophobia  Cardiovascular: Positive for dyspnea on exertion and chest pain with emotional stress. Negative " claudication, cyanosis, irregular heartbeat, leg swelling, near-syncope, orthopnea, palpitations and paroxysmal nocturnal dyspnia.   Respiratory: Negative for cough, shortness of breath, sleep disturbances due to breathing, snoring and wheezing. [place] score 13 down to a 0 on BiPAP 100% use  Endocrine: Negative for polydipsia and polyuria.   Hematologic/Lymphatic: Does not bruise/bleed easily.   Skin: Negative for nail changes, color change, flushing, poor wound healing and suspicious lesions. ecchymosis on ASA especially with the cat, and warfarin  Musculoskeletal: Positive for arthritis, back pain and muscle cramps on daily Xanax 1 mg. Negative for falls, gout, joint pain, joint swelling,   Bilateral hip replacements (right 1996, left 2001), right knee arthroscopic procedure 1996.  Have muscle weakness and myalgias in hip and legs, also muscle cramps in the hands, and legs at night.  Gastrointestinal: Negative for heartburn, hematemesis, hematochezia, melena and nausea. Have no bloating, have constipation and excessive gas, some change in bowel habits.  Neurological: Negative for disturbances in coordination, have excessive daytime sleepiness, dizziness, focal weakness in both LE, occasional light-headedness, have numbness, occasional loss of balance, no vertigo.   Psychiatric/Behavioral: Negative for depression and substance abuse. The patient is nervous/anxious and stressed with rentals and memory loss. The patient does not have insomnia.        Objective:     Physical Exam   Constitutional: He is oriented to person, place, and time. He appears well-developed and well-nourished.   HENT:   Head: Normocephalic.   Eyes: Conjunctivae and EOM are normal. Pupils are equal, round, and reactive to light.   Neck: Normal range of motion. Neck supple. No JVD present. No thyromegaly present.   Cardiovascular: regular rate, regular rhythm, soft heart sounds and intact distal pulses. Exam reveals no gallop and no friction  "rub.   No murmur heard.  No swelling.   Pulmonary/Chest: Effort normal. He has no wheezes. He has no rales. He exhibits no tenderness.   Abdominal: Soft. Bowel sounds are normal. There is no tenderness.  Protuberance, waist circumference 57.5" , hip 55 inches.   Musculoskeletal: Normal range of motion. He exhibits No tenderness (mild behind the left knee.). He exhibits no edema in left lower extremities.   Lymphadenopathy:   He has no cervical adenopathy.   Neurological: He is alert and oriented to person, place, and time.   Skin: Skin is warm and dry. No rash noted. Scattered ecchymosis.  Venous stasis, bilateral with stasis dermatitis with multiple scratches.        Assessment:    Richard was seen today for 6 month f/u.    Diagnoses and all orders for this visit:    Degenerative disc disease, lumbar    PAF (paroxysmal atrial fibrillation)  -     EKG 12-lead    Thoracic aortic aneurysm without rupture    Back pain of lumbosacral region with sciatica    Cardiomyopathy, unspecified type    Bilateral carotid artery stenosis    Chronic systolic congestive heart failure    CKD (chronic kidney disease) stage 3, GFR 30-59 ml/min, 41    History of MI (myocardial infarction), 2010    Hypercoagulable state, Prothrombin mutation    Mixed hyperlipidemia    Hypertension associated with diabetes    Hypertensive left ventricular hypertrophy, without heart failure    Long term (current) use of anticoagulants    MCI (mild cognitive impairment)    Morbid obesity, BMI 42.8    Paroxysmal atrial fibrillation    Peripheral vascular disease    Obstructive sleep apnea syndrome    Status post insertion of drug eluting coronary artery stent, 3x, last RCA 1/2015    Anemia, chronic disease    Hypoalbuminemia    Elevated brain natriuretic peptide (BNP) level        Plan:    Richard was seen today for 6 month f/u.    Diagnoses and all orders for this visit:    Degenerative disc disease, lumbar    PAF (paroxysmal atrial fibrillation)  -     EKG " 12-lead    Thoracic aortic aneurysm without rupture    Back pain of lumbosacral region with sciatica    Cardiomyopathy, unspecified type    Bilateral carotid artery stenosis    Chronic systolic congestive heart failure    CKD (chronic kidney disease) stage 3, GFR 30-59 ml/min, 41    History of MI (myocardial infarction), 2010    Hypercoagulable state, Prothrombin mutation    Mixed hyperlipidemia    Hypertension associated with diabetes    Hypertensive left ventricular hypertrophy, without heart failure    Long term (current) use of anticoagulants    MCI (mild cognitive impairment)    Morbid obesity, BMI 42.8    Paroxysmal atrial fibrillation    Peripheral vascular disease    Obstructive sleep apnea syndrome    Status post insertion of drug eluting coronary artery stent, 3x, last RCA 1/2015    Anemia, chronic disease    Hypoalbuminemia    Elevated brain natriuretic peptide (BNP) level      - All medical issues reviewed, continue current Rx.  - CV status stable, all medications reviewed, patient acknowledge good understanding.  - Recommend healthy living: healthy diet and regular exercise aiming for fitness, and weight control  - Any activity is better than none.  - Check home blood pressure, 2 days weekly, do 2 readings within 5 minutes in AM and PM, keep log for review.  - Weigh twice weekly, try to lose 1-2 lbs per week, target loss of 5% to 10%.   - Highly recommend 30 minutes of exercise daily, can have Sunday off, with 2-3 sessions of muscle strengthening weekly. A  would be very helpful.  - Recommend at least biannual cardiovascular evaluation in view of his significant risk factors. Patient's preference  - Consider nuclear stress test at next visit.        Patient Active Problem List   Diagnosis    Diabetes mellitus with chronic kidney disease, stage III    MTHFR mutation (methylenetetrahydrofolate reductase)    Sleep apnea, using BiPAP nightly, 1999, last sleep test in 2013    Hypertension  associated with diabetes    Atherosclerosis of native coronary artery with angina pectoris    Hyperlipidemia, baseline     Gout    GERD (gastroesophageal reflux disease)    Hypercoagulable state, Prothrombin mutation    Colon polyps    Anxiety    Morbid obesity, BMI 42.8    Carotid disease, bilateral    Aortic aneurysm, thoracic, 4.3 cm, 8/2010    CKD (chronic kidney disease) stage 3, GFR 30-59 ml/min, 41    LV dysfunction, EF 40%    Long term (current) use of anticoagulants    OA (osteoarthritis). post bilateral THR, 2001    Diabetic neuropathy    H/O bariatric surgery, 2008    Osteoarthritis, knee    BPH with obstruction/lower urinary tract symptoms    Proteinuria    Paroxysmal atrial fibrillation    Stasis dermatitis of both legs    Hypertensive left ventricular hypertrophy, without heart failure    Prostate cancer    Peripheral vascular disease    Facet arthropathy    Anemia, chronic disease    Uncomplicated opioid dependence    MCI (mild cognitive impairment)    Cardiomyopathy    History of MI (myocardial infarction), 2010    Status post insertion of drug eluting coronary artery stent, 3x, last RCA 1/2015    Chronic systolic congestive heart failure    Elevated brain natriuretic peptide (BNP) level    Back pain of lumbosacral region with sciatica    Other spondylosis, lumbar region    Bradycardia, drug induced    OAB (overactive bladder)    Urge incontinence    Complex renal cyst    Degenerative disc disease, lumbar    Hypoalbuminemia     Greater than 50% was spent in counseling and coordination of care. The above assessment and plan have been discussed at length. Labs and procedure over the last 6 months reviewed. Problem List updated. Asked to bring in all active medications / pills bottles with next visit.

## 2020-08-16 RX ORDER — MIRABEGRON 50 MG/1
50 TABLET, FILM COATED, EXTENDED RELEASE ORAL DAILY
Qty: 90 TABLET | Refills: 3 | Status: SHIPPED | OUTPATIENT
Start: 2020-08-16 | End: 2020-08-17 | Stop reason: SDUPTHER

## 2020-08-17 ENCOUNTER — PATIENT OUTREACH (OUTPATIENT)
Dept: ADMINISTRATIVE | Facility: HOSPITAL | Age: 75
End: 2020-08-17

## 2020-08-17 DIAGNOSIS — E11.9 DIABETES MELLITUS WITHOUT COMPLICATION: Primary | ICD-10-CM

## 2020-08-17 RX ORDER — MIRABEGRON 50 MG/1
50 TABLET, FILM COATED, EXTENDED RELEASE ORAL DAILY
Qty: 90 TABLET | Refills: 3 | Status: SHIPPED | OUTPATIENT
Start: 2020-08-17 | End: 2021-01-21 | Stop reason: ALTCHOICE

## 2020-08-17 NOTE — PROGRESS NOTES
[facility]  PROGRESS NOTE      Subjective:       Patient ID:   NAME: Richard Bustos : 1945     74 y.o. male    Referring Doc: Ashley  Other Physicians: Bebeto Torrez Chamberlain    Chief Complaint:  Clot disorder f/u    History of Present Illness:     Patient returns today for a regularly scheduled follow-up visit.  The patient is doing ok overall with foot ulcer and infection on right foot since resolved. He has been under the care of wound care and Dr Torrez with podiatry .  He is breathing ok. He denies any current fever, CP, SOB, HA's or N/V. He is here by himself today.     He has not required any hospitalizations lately    Discussed covid19 precautions.                ROS:   GEN: normal without any fever, night sweats or weight loss  HEENT: normal with no HA's, sore throat, stiff neck, changes in vision  CV: normal with no CP, SOB, PND, THOMPSON or orthopnea  PULM: normal with no SOB, cough, hemoptysis, sputum or pleuritic pain  GI: normal with no abdominal pain, nausea, vomiting, constipation, diarrhea, melanotic stools, BRBPR, or hematemesis  : normal with no hematuria, dysuria  BREAST: normal with no mass, discharge, pain  SKIN: normal with no rash, erythema, bruising, or swelling    Allergies:  Review of patient's allergies indicates:   Allergen Reactions    Shellfish containing products Other (See Comments)     Other reaction(s): Gout       Medications:    Current Outpatient Medications:     allopurinoL (ZYLOPRIM) 100 MG tablet, TAKE 1 TABLET(100 MG) BY MOUTH EVERY DAY, Disp: 90 tablet, Rfl: 3    ammonium lactate 12 % Crea, Apply twice daily to affected parts both feet as needed., Disp: 140 g, Rfl: 11    aspirin (ECOTRIN) 81 MG EC tablet, Take 81 mg by mouth once daily., Disp: , Rfl:     atorvastatin (LIPITOR) 80 MG tablet, TAKE 1 TABLET(80 MG) BY MOUTH EVERY DAY, Disp: 90 tablet, Rfl: 3    calcitRIOL (ROCALTROL) 0.5 MCG Cap, once daily. , Disp: , Rfl: 4    carvediloL (COREG) 25 MG  tablet, TAKE 1 TABLET(25 MG) BY MOUTH TWICE DAILY WITH MEALS, Disp: 180 tablet, Rfl: 3    ciclopirox (PENLAC) 8 % Soln, Apply topically nightly., Disp: 6.6 mL, Rfl: 11    clopidogrel (PLAVIX) 75 mg tablet, TAKE 1 TABLET BY MOUTH DAILY, Disp: 90 tablet, Rfl: 3    ergocalciferol (ERGOCALCIFEROL) 50,000 unit Cap, ergocalciferol (vitamin D2) 1,250 mcg (50,000 unit) capsule  TK 1 C PO Q WK, Disp: , Rfl:     famotidine (PEPCID) 40 MG tablet, Take 1 tablet (40 mg total) by mouth once daily., Disp: 30 tablet, Rfl: 11    ferrous sulfate (FEOSOL) 325 mg (65 mg iron) Tab tablet, Take 1 tablet (325 mg total) by mouth 2 (two) times daily., Disp: 180 tablet, Rfl: 1    fluticasone propionate (FLONASE) 50 mcg/actuation nasal spray, SHAKE LIQUID AND USE 2 SPRAYS(100 MCG) IN EACH NOSTRIL EVERY DAY, Disp: 16 g, Rfl: 5    FOLIC ACID/MULTIVIT-MIN/LUTEIN (CENTRUM SILVER ORAL), Take 1 tablet by mouth once daily., Disp: , Rfl:     furosemide (LASIX) 40 MG tablet, TAKE 1 TABLET BY MOUTH DAILY AS NEEDED, Disp: 90 tablet, Rfl: 3    lisinopriL (PRINIVIL,ZESTRIL) 40 MG tablet, Take 1 tablet (40 mg total) by mouth every evening., Disp: 90 tablet, Rfl: 3    magnesium oxide (MAG-OX) 400 mg (241.3 mg magnesium) tablet, TAKE 1 TABLET BY MOUTH EVERY DAY, Disp: 90 tablet, Rfl: 3    mecobal-levomefolat Ca-B6 phos (L-METHYL-B6-B12) 3-35-2 mg Tab, Take 1 tablet by mouth 2 (two) times daily., Disp: 180 tablet, Rfl: 3    mirabegron (MYRBETRIQ) 50 mg Tb24, Take 1 tablet (50 mg total) by mouth once daily., Disp: 90 tablet, Rfl: 3    nitroGLYCERIN 0.4 MG/DOSE TL SPRY (NITROLINGUAL) 400 mcg/spray spray, PLACE 1 SPRAY UNDER THE TONGUE EVERY 5 MINUTES AS NEEDED FOR CHEST PAIN, Disp: 4.9 g, Rfl: 3    omeprazole (PRILOSEC) 20 MG capsule, TAKE 1 CAPSULE BY MOUTH DAILY, Disp: 90 capsule, Rfl: 3    traMADoL (ULTRAM) 50 mg tablet, Take 1 tablet (50 mg total) by mouth every 8 (eight) hours as needed for Pain., Disp: 90 tablet, Rfl: 0    traMADoL  (ULTRAM) 50 mg tablet, Take 1 tablet (50 mg total) by mouth every 8 (eight) hours as needed for Pain., Disp: 90 tablet, Rfl: 0    vit A/vit C/vit E/zinc/copper (PRESERVISION AREDS ORAL), PreserVision AREDS, Disp: , Rfl:     vit C-vit E-lutein-min-om-3 (OCUVITE) 641-66-9-150 mg-unit-mg-mg Cap, Take 1 capsule by mouth once daily., Disp: , Rfl:     warfarin (COUMADIN) 5 MG tablet, Current dose: Take 5mg daily or as directed by coumadin clinic, Disp: 90 tablet, Rfl: 3    calcium carbonate (TUMS ULTRA) 400 mg calcium (1,000 mg) Chew, Take 1 tablet (400 mg total) by mouth once daily., Disp: 30 each, Rfl: 11    hydrocortisone 2.5 % cream, Apply topically 2 (two) times daily. for 10 days, Disp: 1 Tube, Rfl: 2    LORazepam (ATIVAN) 2 MG Tab, Take 1 tablet (2 mg total) by mouth as needed (pre-MRI anxiety)., Disp: 1 tablet, Rfl: 0  No current facility-administered medications for this visit.     Facility-Administered Medications Ordered in Other Visits:     lactated ringers infusion, , Intravenous, Once PRN, Evangelist Bose MD    PMHx/PSHx Updates:  See patient's last visit with me on  2/18/2020  See H&P on 4/9/2014        Pathology:  Cancer Staging  No matching staging information was found for the patient.          Objective:     Vitals:  Blood pressure 120/72, pulse (!) 58, temperature 96.8 °F (36 °C), resp. rate 18, weight 129.7 kg (285 lb 14.4 oz).    Physical Examination:   GEN: no apparent distress, comfortable; AAOx3; overweight  HEAD: atraumatic and normocephalic  EYES: no pallor, no icterus, PERRLA  ENT: OMM, no pharyngeal erythema, external ears WNL; no nasal discharge; no thrush  NECK: no masses, thyroid normal, trachea midline, no LAD/LN's, supple  CV: RRR with no murmur; normal pulse; normal S1 and S2; no pedal edema  CHEST: Normal respiratory effort; CTAB; normal breath sounds; no wheeze or crackles  ABDOM: nontender and nondistended; soft; normal bowel sounds; no rebound/guarding;  overweight  MUSC/Skeletal: ROM normal; no crepitus; joints normal; no deformities or arthropathy; uses cane to walk  EXTREM: erythematous changes on bilateral lower extremities;feet are better  SKIN: no rashes, petechiae or subcutaneous nodules; prior skin cancer on face s/p cream (resolved); chronic skin changes  : no mcdaniels  NEURO: grossly intact; motor/sensory WNL; AAOx3; no tremors  PSYCH: normal mood, affect and behavior  LYMPH: normal cervical, supraclavicular, axillary and groin LN's            Labs:          Lab Results   Component Value Date    WBC 7.70 08/17/2020    HGB 11.0 (L) 08/17/2020    HCT 32.2 (L) 08/17/2020     (H) 08/17/2020     08/17/2020          BMP  Lab Results   Component Value Date     08/17/2020    K 5.0 08/17/2020     (H) 08/17/2020    CO2 22 (L) 08/17/2020    BUN 32 (H) 08/17/2020    CREATININE 1.6 (H) 08/17/2020    CALCIUM 9.4 08/17/2020    ANIONGAP 8 08/17/2020    ESTGFRAFRICA 49.0 (A) 08/17/2020    EGFRNONAA 42.4 (A) 08/17/2020          Lab Results   Component Value Date    ALT 19 08/17/2020    AST 23 08/17/2020    ALKPHOS 78 08/17/2020    BILITOT 0.6 08/17/2020        Lab Results   Component Value Date    INR 2.4 (H) 08/17/2020    INR 2.5 (H) 07/21/2020    INR 2.5 (H) 07/07/2020         Radiology/Diagnostic Studies:    No results found.    I have reviewed all available lab results and radiology reports.    Assessment/Plan:   (1) 74 y.o. male with diagnosis of chronic clot disorder with underlying MTHFR issues  - he was previously under the care of Dr Krunal Rodriguez with hematology at [facility]  - he has been on coumadin therapy per direction of Dr Tate with cardiology  - he has been having it adjusted by cardiology  - latest INR was 2.4 - followed and under direction of care by Dr Tate      (2) CAD - followed by Dr Tate    (3) Abdominal hematoma in past     (4) CRI - followed by Nephrology (Amy?)    (5) Chronic multifactorial anemia with underlying anemia of  chronic disorders and chronic renal disease; chronic GIB  - latest hgb at  11.0 and relatively stable  - he is on oral iron  - latest iron panel pending still    (6) Right foot diabetic ulcer/ prior left toe issues - followed by Dr Torrez - improved per patient    (7) Urology following for prostate - Dr Moss        1. Anemia, chronic disease     2. Long term (current) use of anticoagulants     3. Hypercoagulable state, Prothrombin mutation     4. Prostate cancer     5. MTHFR mutation (methylenetetrahydrofolate reductase)     6. H/O bariatric surgery, 2008           PLAN:  1. Check labs every 3 months  2. F/u with PCP, card, and neph  3. Continue coumadin per Dr Tate's direction  4. RTC in 6 months    Fax note to Bebeto Ramirez; Shen Trorez    Discussion:       COVID-19 Discussion:    I had long discussion with patient and any applicable family about the COVID-19 coronavirus epidemic and the recommended precautions with regard to cancer and/or hematology patients. I have re-iterated the CDC recommendations for adequate hand washing, use of hand -like products, and coughing into elbow, etc. In addition, especially for our patients who are on chemotherapy and/or our otherwise immunocompromised patients, I have recommended avoidance of crowds, including movie theaters, restaurants, churches, etc. I have recommended avoidance of any sick or symptomatic family members and/or friends. I have also recommended avoidance of any raw and unwashed food products, and general avoidance of food items that have not been prepared by themselves. The patient has been asked to call us immediately with any symptom developments, issues, questions or other general concerns.       Anticoagulation Discussion:    Discussed with patient and any applicable family members about the benefit and/or need for anticoagulation. I communicated about the risks of bleeding while on any anticoagulation, which could be serious and/or  life-threatening, and which can occur at any time, regardless of degree of the level of anticoagulation. I expressed the need for compliance with any anticoagulation regimen and that failure to do so could potential lead to excessive bleeding, and risk to health and/or life. In particular, with patients on coumadin therapy, compliance with requested blood work is absolutely essential, as coumadin levels can vary from time to time, and failure to do so could potentially place the patient at risk for bleeding and/or clotting events which could be fatal. Patients on coumadin are encouraged to call the day after they have their levels drawn, as to obtain the appropriate instructions from my staff. Patients are aware that self-regulating or self-dosing of their medications is strictly prohibited.       I have explained all of the above in detail and the patient understands all of the current recommendation(s). I have answered all of their questions to the best of my ability and to their complete satisfaction.   The patient is to continue with the current management plan.            Electronically signed by Kai Ortiz MD

## 2020-08-17 NOTE — PROGRESS NOTES
Chart review completed 2020.  Care Everywhere updates requested and reviewed.  Immunizations reconciled. Media reports reviewed.  Duplicate HM overrides and  orders removed.  Overdue HM topic chart audit and/or requested.  Overdue lab testing linked to upcoming lab appointments if applies.    Lab [employer], and [employer] reviewed    Lab testing     [facility] orders placed. LIPID      Health Maintenance Due   Topic Date Due    Shingles Vaccine (2 of 3) 2015    Eye Exam  2020    Lipid Panel  2020

## 2020-08-18 ENCOUNTER — OFFICE VISIT (OUTPATIENT)
Dept: HEMATOLOGY/ONCOLOGY | Facility: CLINIC | Age: 75
End: 2020-08-18
Payer: MEDICARE

## 2020-08-18 VITALS
DIASTOLIC BLOOD PRESSURE: 72 MMHG | BODY MASS INDEX: 42.22 KG/M2 | WEIGHT: 285.88 LBS | HEART RATE: 58 BPM | RESPIRATION RATE: 18 BRPM | SYSTOLIC BLOOD PRESSURE: 120 MMHG | TEMPERATURE: 97 F

## 2020-08-18 DIAGNOSIS — Z15.89 MTHFR MUTATION (METHYLENETETRAHYDROFOLATE REDUCTASE): ICD-10-CM

## 2020-08-18 DIAGNOSIS — D68.59 HYPERCOAGULABLE STATE: ICD-10-CM

## 2020-08-18 DIAGNOSIS — Z79.01 LONG TERM (CURRENT) USE OF ANTICOAGULANTS: ICD-10-CM

## 2020-08-18 DIAGNOSIS — Z98.84 H/O BARIATRIC SURGERY: ICD-10-CM

## 2020-08-18 DIAGNOSIS — D63.8 ANEMIA, CHRONIC DISEASE: Primary | ICD-10-CM

## 2020-08-18 DIAGNOSIS — C61 PROSTATE CANCER: ICD-10-CM

## 2020-08-18 PROCEDURE — 1101F PT FALLS ASSESS-DOCD LE1/YR: CPT | Mod: S$GLB,,, | Performed by: INTERNAL MEDICINE

## 2020-08-18 PROCEDURE — 3078F PR MOST RECENT DIASTOLIC BLOOD PRESSURE < 80 MM HG: ICD-10-PCS | Mod: S$GLB,,, | Performed by: INTERNAL MEDICINE

## 2020-08-18 PROCEDURE — 3008F PR BODY MASS INDEX (BMI) DOCUMENTED: ICD-10-PCS | Mod: S$GLB,,, | Performed by: INTERNAL MEDICINE

## 2020-08-18 PROCEDURE — 3008F BODY MASS INDEX DOCD: CPT | Mod: S$GLB,,, | Performed by: INTERNAL MEDICINE

## 2020-08-18 PROCEDURE — 1159F MED LIST DOCD IN RCRD: CPT | Mod: S$GLB,,, | Performed by: INTERNAL MEDICINE

## 2020-08-18 PROCEDURE — 99213 OFFICE O/P EST LOW 20 MIN: CPT | Mod: S$GLB,,, | Performed by: INTERNAL MEDICINE

## 2020-08-18 PROCEDURE — 1126F AMNT PAIN NOTED NONE PRSNT: CPT | Mod: S$GLB,,, | Performed by: INTERNAL MEDICINE

## 2020-08-18 PROCEDURE — 3074F SYST BP LT 130 MM HG: CPT | Mod: S$GLB,,, | Performed by: INTERNAL MEDICINE

## 2020-08-18 PROCEDURE — 99213 PR OFFICE/OUTPT VISIT, EST, LEVL III, 20-29 MIN: ICD-10-PCS | Mod: S$GLB,,, | Performed by: INTERNAL MEDICINE

## 2020-08-18 PROCEDURE — 1101F PR PT FALLS ASSESS DOC 0-1 FALLS W/OUT INJ PAST YR: ICD-10-PCS | Mod: S$GLB,,, | Performed by: INTERNAL MEDICINE

## 2020-08-18 PROCEDURE — 1159F PR MEDICATION LIST DOCUMENTED IN MEDICAL RECORD: ICD-10-PCS | Mod: S$GLB,,, | Performed by: INTERNAL MEDICINE

## 2020-08-18 PROCEDURE — 3074F PR MOST RECENT SYSTOLIC BLOOD PRESSURE < 130 MM HG: ICD-10-PCS | Mod: S$GLB,,, | Performed by: INTERNAL MEDICINE

## 2020-08-18 PROCEDURE — 3078F DIAST BP <80 MM HG: CPT | Mod: S$GLB,,, | Performed by: INTERNAL MEDICINE

## 2020-08-18 PROCEDURE — 1126F PR PAIN SEVERITY QUANTIFIED, NO PAIN PRESENT: ICD-10-PCS | Mod: S$GLB,,, | Performed by: INTERNAL MEDICINE

## 2020-08-28 ENCOUNTER — OFFICE VISIT (OUTPATIENT)
Dept: PODIATRY | Facility: CLINIC | Age: 75
End: 2020-08-28
Payer: MEDICARE

## 2020-08-28 ENCOUNTER — APPOINTMENT (OUTPATIENT)
Dept: RADIOLOGY | Facility: OTHER | Age: 75
End: 2020-08-28
Attending: PODIATRIST
Payer: MEDICARE

## 2020-08-28 VITALS
WEIGHT: 285 LBS | HEIGHT: 69 IN | BODY MASS INDEX: 42.21 KG/M2 | DIASTOLIC BLOOD PRESSURE: 69 MMHG | HEART RATE: 56 BPM | SYSTOLIC BLOOD PRESSURE: 114 MMHG

## 2020-08-28 DIAGNOSIS — B35.1 ONYCHOMYCOSIS DUE TO DERMATOPHYTE: ICD-10-CM

## 2020-08-28 DIAGNOSIS — Z79.01 LONG TERM (CURRENT) USE OF ANTICOAGULANTS: Primary | ICD-10-CM

## 2020-08-28 DIAGNOSIS — E11.49 TYPE II DIABETES MELLITUS WITH NEUROLOGICAL MANIFESTATIONS: ICD-10-CM

## 2020-08-28 DIAGNOSIS — Z87.898 HISTORY OF ULCERATION: ICD-10-CM

## 2020-08-28 DIAGNOSIS — Z79.01 LONG TERM (CURRENT) USE OF ANTICOAGULANTS: ICD-10-CM

## 2020-08-28 PROCEDURE — 1159F MED LIST DOCD IN RCRD: CPT | Mod: S$GLB,,, | Performed by: PODIATRIST

## 2020-08-28 PROCEDURE — 1126F PR PAIN SEVERITY QUANTIFIED, NO PAIN PRESENT: ICD-10-PCS | Mod: S$GLB,,, | Performed by: PODIATRIST

## 2020-08-28 PROCEDURE — 99213 PR OFFICE/OUTPT VISIT, EST, LEVL III, 20-29 MIN: ICD-10-PCS | Mod: 25,S$GLB,, | Performed by: PODIATRIST

## 2020-08-28 PROCEDURE — 3074F PR MOST RECENT SYSTOLIC BLOOD PRESSURE < 130 MM HG: ICD-10-PCS | Mod: CPTII,S$GLB,, | Performed by: PODIATRIST

## 2020-08-28 PROCEDURE — 73630 XR FOOT COMPLETE 3 VIEW LEFT: ICD-10-PCS | Mod: 26,LT,, | Performed by: RADIOLOGY

## 2020-08-28 PROCEDURE — 3008F PR BODY MASS INDEX (BMI) DOCUMENTED: ICD-10-PCS | Mod: CPTII,S$GLB,, | Performed by: PODIATRIST

## 2020-08-28 PROCEDURE — 11721 PR DEBRIDEMENT OF NAILS, 6 OR MORE: ICD-10-PCS | Mod: S$GLB,,, | Performed by: PODIATRIST

## 2020-08-28 PROCEDURE — 3078F DIAST BP <80 MM HG: CPT | Mod: CPTII,S$GLB,, | Performed by: PODIATRIST

## 2020-08-28 PROCEDURE — 3074F SYST BP LT 130 MM HG: CPT | Mod: CPTII,S$GLB,, | Performed by: PODIATRIST

## 2020-08-28 PROCEDURE — 11721 DEBRIDE NAIL 6 OR MORE: CPT | Mod: S$GLB,,, | Performed by: PODIATRIST

## 2020-08-28 PROCEDURE — 73630 X-RAY EXAM OF FOOT: CPT | Mod: 26,LT,, | Performed by: RADIOLOGY

## 2020-08-28 PROCEDURE — 1101F PT FALLS ASSESS-DOCD LE1/YR: CPT | Mod: CPTII,S$GLB,, | Performed by: PODIATRIST

## 2020-08-28 PROCEDURE — 3044F PR MOST RECENT HEMOGLOBIN A1C LEVEL <7.0%: ICD-10-PCS | Mod: CPTII,S$GLB,, | Performed by: PODIATRIST

## 2020-08-28 PROCEDURE — 3044F HG A1C LEVEL LT 7.0%: CPT | Mod: CPTII,S$GLB,, | Performed by: PODIATRIST

## 2020-08-28 PROCEDURE — 1159F PR MEDICATION LIST DOCUMENTED IN MEDICAL RECORD: ICD-10-PCS | Mod: S$GLB,,, | Performed by: PODIATRIST

## 2020-08-28 PROCEDURE — 1101F PR PT FALLS ASSESS DOC 0-1 FALLS W/OUT INJ PAST YR: ICD-10-PCS | Mod: CPTII,S$GLB,, | Performed by: PODIATRIST

## 2020-08-28 PROCEDURE — 73630 X-RAY EXAM OF FOOT: CPT | Mod: TC,LT

## 2020-08-28 PROCEDURE — 99213 OFFICE O/P EST LOW 20 MIN: CPT | Mod: 25,S$GLB,, | Performed by: PODIATRIST

## 2020-08-28 PROCEDURE — 3008F BODY MASS INDEX DOCD: CPT | Mod: CPTII,S$GLB,, | Performed by: PODIATRIST

## 2020-08-28 PROCEDURE — 99999 PR PBB SHADOW E&M-EST. PATIENT-LVL III: CPT | Mod: PBBFAC,,, | Performed by: PODIATRIST

## 2020-08-28 PROCEDURE — 1126F AMNT PAIN NOTED NONE PRSNT: CPT | Mod: S$GLB,,, | Performed by: PODIATRIST

## 2020-08-28 PROCEDURE — 99999 PR PBB SHADOW E&M-EST. PATIENT-LVL III: ICD-10-PCS | Mod: PBBFAC,,, | Performed by: PODIATRIST

## 2020-08-28 PROCEDURE — 3078F PR MOST RECENT DIASTOLIC BLOOD PRESSURE < 80 MM HG: ICD-10-PCS | Mod: CPTII,S$GLB,, | Performed by: PODIATRIST

## 2020-08-28 NOTE — PROGRESS NOTES
Subjective:      Patient ID: Richard Bustos is a 74 y.o. male.    Chief Complaint: Diabetes Mellitus (Dr Ashley Little, 6-), Diabetic Foot Exam, and Ankle Pain (bilateral)    Richard is a 74 y.o. male who presents to the clinic for evaluation and treatment of high risk feet. Richard has a past medical history of Anemia, Anemia of other chronic disease (9/13/2017), Anemia, chronic renal failure (9/13/2017), Anemia, unspecified (9/13/2017), Anticoagulant long-term use, Aorta aneurysm, Arthritis, Atrial fibrillation, CAD (coronary artery disease), CHF (congestive heart failure), Chronic kidney disease, Clotting disorder (9/13/2017), Colon polyp, Dementia, Diabetes mellitus, Diabetes mellitus type II, Diverticulosis, Elevated PSA, Encounter for blood transfusion, Former smoker, General anesthetics causing adverse effect in therapeutic use, GERD (gastroesophageal reflux disease), Gout, colonic polyps, Hypercoagulable state, Hyperlipidemia, Hypertension, MI, old (2010), MTHFR mutation, Myocardial infarction, Osteomyelitis of ankle or foot (3/9/2017), Prostate cancer (06/2016), Sleep apnea, Squamous cell carcinoma (01/23/2018), and Venous stasis. The patient's chief complaint is long, thick toenails. Gradual onset, worsening over past several weeks, aggravated by increased weight bearing, shoe gear, pressure.  No previous medical treatment.  OTC pain med not helping.    CC2 increased prominence of bone with intermittent tenderness at the left arch.  Gradual onset worsening over the past several months aggravated by increased weight-bearing some shoe gear no previous medical treatment no self-treatment current diabetic shoe gear worn out and no longer comfortable.        PCP: Familia Ramirez Jr, MD    Date Last Seen by PCP:   Chief Complaint   Patient presents with    Diabetes Mellitus     Dr sAhley Little, 6-    Diabetic Foot Exam    Ankle Pain     bilateral        Current shoe gear:  Affected Foot:  Rx diabetic extra depth shoes and custom accommodative insoles     Unaffected Foot: Rx diabetic extra depth shoes and custom accommodative insoles    Hemoglobin A1C   Date Value Ref Range Status   05/20/2020 6.0 (H) 4.0 - 5.6 % Final     Comment:     ADA Screening Guidelines:  5.7-6.4%  Consistent with prediabetes  >or=6.5%  Consistent with diabetes  High levels of fetal hemoglobin interfere with the HbA1C  assay. Heterozygous hemoglobin variants (HbS, HgC, etc)do  not significantly interfere with this assay.   However, presence of multiple variants may affect accuracy.     02/13/2020 6.0 (H) 4.0 - 5.6 % Final     Comment:     ADA Screening Guidelines:  5.7-6.4%  Consistent with prediabetes  >or=6.5%  Consistent with diabetes  High levels of fetal hemoglobin interfere with the HbA1C  assay. Heterozygous hemoglobin variants (HbS, HgC, etc)do  not significantly interfere with this assay.   However, presence of multiple variants may affect accuracy.     08/16/2019 5.6 4.0 - 5.6 % Final     Comment:     ADA Screening Guidelines:  5.7-6.4%  Consistent with prediabetes  >or=6.5%  Consistent with diabetes  High levels of fetal hemoglobin interfere with the HbA1C  assay. Heterozygous hemoglobin variants (HbS, HgC, etc)do  not significantly interfere with this assay.   However, presence of multiple variants may affect accuracy.     06/17/2013 6.8 (H) 4.8 - 5.6 % Final     Comment:              Increased risk for diabetes: 5.7 - 6.4           Diabetes: >6.4           Glycemic control for adults with diabetes: <7.0  **In order to standardize %HbA1c results worldwide, as of October 11, 2010,  the %HbA1c is being calculated using the master equation recommended in the  consensus statement adopted by the ADA (American Diabetes Assoc), EASD  (European Assoc for the Study of Diabetes), IFCC (International Federation  of Clinical Chemistry and Laboratory Medicine) and IDF (International  Diabetes Federation). Result units: %HgbA1c  (DCCT/NGSP).  In common with other methods, Hb A1C values may not accurately reflect mean  blood glucose in patients with hemoglobin variants (HgbF, HgbS and HgbC).  Any cause of shortened erythrocyte survival will reduce exposure of  erythrocytes to glucose with a consequent decrease in HbA1c (%) values, even  though the time-averaged blood glucose level may be elevated. Causes of  shortened erythrocyte lifetime might be hemolytic anemia or other hemolytic  diseases, homozygous sickle cell trait, pregnancy, recent significant or  chronic blood loss, etc. Caution should be used when interpreting the HbA1c  results from patients with these conditions.       Review of Systems   Constitution: Negative for chills, diaphoresis, fever, malaise/fatigue and night sweats.   Cardiovascular: Positive for leg swelling. Negative for claudication, cyanosis and syncope.   Skin: Positive for nail changes and poor wound healing. Negative for color change, dry skin, rash, suspicious lesions and unusual hair distribution.   Musculoskeletal: Negative for falls, joint pain, joint swelling, muscle cramps, muscle weakness and stiffness.   Gastrointestinal: Negative for constipation, diarrhea, nausea and vomiting.   Neurological: Positive for numbness, paresthesias and sensory change. Negative for brief paralysis, disturbances in coordination, focal weakness and tremors.           Objective:      Physical Exam  Constitutional:       General: He is not in acute distress.     Appearance: He is well-developed. He is not diaphoretic.   Cardiovascular:      Pulses:           Popliteal pulses are 2+ on the right side and 2+ on the left side.        Dorsalis pedis pulses are 1+ on the right side and 1+ on the left side.        Posterior tibial pulses are 1+ on the right side and 1+ on the left side.      Comments: Capillary refill 3 seconds all toes/distal feet, all toes/both feet warm to touch.      Negative lymphadenopathy bilateral popliteal  fossa and tarsal tunnel.     <2+ pitting lower extremity edema bilateral.    Musculoskeletal:      Right ankle: He exhibits normal range of motion, no swelling, no ecchymosis, no deformity, no laceration and normal pulse. Achilles tendon normal. Achilles tendon exhibits no pain, no defect and normal Chung's test results.      Comments: Hypermobility/instability left TN joint without symptom today, but with more prominent bone/joint medially.    Otherwise, increased base abducted angle normal station and gait bilateral.  Reduced a propulsive toe off bilateral.. All ten toes without clubbing, cyanosis, or signs of ischemia.  No pain to palpation bilateral lower extremities.  Range of motion, stability, muscle strength, and muscle tone age and health appropriate normal bilateral feet and legs.    Lymphadenopathy:      Lower Body: No right inguinal adenopathy. No left inguinal adenopathy.      Comments: Negative lymphadenopathy bilateral popliteal fossa and tarsal tunnel.    Negative lymphangitic streaking bilateral feet/ankles/legs.   Skin:     General: Skin is warm and dry.      Capillary Refill: Capillary refill takes 2 to 3 seconds.      Coloration: Skin is not pale.      Findings: No abrasion, bruising, burn, ecchymosis, erythema, laceration, lesion or rash.      Nails: There is no clubbing.        Comments:   Skin thin, shiny, atrophic, with decreased density and distribution of pedal hair bilateral, but without hyperpigmentation, minerva discoloration,  ulcers, masses, nodules or cords palpated bilateral feet and legs.    Toenails 1st, 2nd, 3rd, 4th, 5th  bilateral are hypertrophic thickened 2-3 mm, dystrophic, discolored tanish brown with tan, gray crumbly subungual debris.  Tender to distal nail plate pressure, without periungual skin abnormality of each.     Neurological:      Mental Status: He is alert and oriented to person, place, and time.      Sensory: Sensory deficit present.      Motor: No tremor,  atrophy or abnormal muscle tone.      Gait: Gait normal.      Deep Tendon Reflexes:      Reflex Scores:       Patellar reflexes are 2+ on the right side and 2+ on the left side.       Achilles reflexes are 2+ on the right side and 2+ on the left side.     Comments: Decreased/absent vibratory sensation bilateral feet to 128Hz tuning fork.    Diminished/loss of protective sensation all toes bilateral to 10 gram monofilament.    Paresthesias,  bilateral feet with no clearly identified trigger or source.     Psychiatric:         Behavior: Behavior is cooperative.               Assessment:       Encounter Diagnoses   Name Primary?    Long term (current) use of anticoagulants Yes    Onychomycosis due to dermatophyte     Type II diabetes mellitus with neurological manifestations     History of ulceration          Plan:       Richard was seen today for diabetes mellitus, diabetic foot exam and ankle pain.    Diagnoses and all orders for this visit:    Long term (current) use of anticoagulants  -     X-Ray Foot Complete Left; Future  -     DIABETIC SHOES FOR HOME USE    Onychomycosis due to dermatophyte  -     X-Ray Foot Complete Left; Future  -     DIABETIC SHOES FOR HOME USE    Type II diabetes mellitus with neurological manifestations  -     X-Ray Foot Complete Left; Future  -     DIABETIC SHOES FOR HOME USE    History of ulceration  -     X-Ray Foot Complete Left; Future  -     DIABETIC SHOES FOR HOME USE      I counseled the patient on his conditions, their implications and medical management.        - Shoe inspection. Diabetic Foot Education. Patient reminded of the importance of good nutrition and blood sugar control to help prevent podiatric complications of diabetes. Patient instructed on proper foot hygeine. We discussed wearing proper shoe gear, daily foot inspections, never walking without protective shoe gear, never putting sharp instruments to feet, routine podiatric visits at least annually.    Discussed  conservative treatment with shoes of adequate dimensions, material, and style to alleviate symptoms and delay or prevent surgical intervention.    Rx DM shoes, inserts, xrays      - With patient's permission, nails were aggressively reduced and debrided x 10 to their soft tissue attachment mechanically, removing all offending nail and debris. Patient relates relief following the procedure. He will continue to monitor the areas daily, inspect his feet, wear protective shoe gear when ambulatory, moisturizer to maintain skin integrity and follow in this office p.r.n.    Inspect feet multiple times daily for signs of occurrence/recurrence ulceration.            Follow up in about 1 year (around 8/28/2021).

## 2020-08-31 ENCOUNTER — PATIENT MESSAGE (OUTPATIENT)
Dept: FAMILY MEDICINE | Facility: CLINIC | Age: 75
End: 2020-08-31

## 2020-08-31 ENCOUNTER — OFFICE VISIT (OUTPATIENT)
Dept: FAMILY MEDICINE | Facility: CLINIC | Age: 75
End: 2020-08-31
Payer: MEDICARE

## 2020-08-31 VITALS
DIASTOLIC BLOOD PRESSURE: 66 MMHG | HEART RATE: 60 BPM | SYSTOLIC BLOOD PRESSURE: 120 MMHG | BODY MASS INDEX: 41.54 KG/M2 | TEMPERATURE: 98 F | WEIGHT: 280.44 LBS | OXYGEN SATURATION: 96 % | HEIGHT: 69 IN

## 2020-08-31 DIAGNOSIS — N18.30 CKD (CHRONIC KIDNEY DISEASE) STAGE 3, GFR 30-59 ML/MIN: ICD-10-CM

## 2020-08-31 DIAGNOSIS — E11.22 TYPE 2 DIABETES MELLITUS WITH STAGE 3 CHRONIC KIDNEY DISEASE, WITHOUT LONG-TERM CURRENT USE OF INSULIN: Primary | Chronic | ICD-10-CM

## 2020-08-31 DIAGNOSIS — E78.2 MIXED HYPERLIPIDEMIA: ICD-10-CM

## 2020-08-31 DIAGNOSIS — N18.30 TYPE 2 DIABETES MELLITUS WITH STAGE 3 CHRONIC KIDNEY DISEASE, WITHOUT LONG-TERM CURRENT USE OF INSULIN: Primary | Chronic | ICD-10-CM

## 2020-08-31 DIAGNOSIS — G31.84 MCI (MILD COGNITIVE IMPAIRMENT): ICD-10-CM

## 2020-08-31 DIAGNOSIS — D68.59 HYPERCOAGULABLE STATE: ICD-10-CM

## 2020-08-31 DIAGNOSIS — I25.119 ATHEROSCLEROSIS OF NATIVE CORONARY ARTERY OF NATIVE HEART WITH ANGINA PECTORIS: Chronic | ICD-10-CM

## 2020-08-31 DIAGNOSIS — Z15.89 MTHFR MUTATION (METHYLENETETRAHYDROFOLATE REDUCTASE): ICD-10-CM

## 2020-08-31 DIAGNOSIS — K21.9 GASTROESOPHAGEAL REFLUX DISEASE WITHOUT ESOPHAGITIS: ICD-10-CM

## 2020-08-31 DIAGNOSIS — E66.01 MORBID OBESITY: ICD-10-CM

## 2020-08-31 DIAGNOSIS — D63.8 ANEMIA, CHRONIC DISEASE: ICD-10-CM

## 2020-08-31 DIAGNOSIS — M54.40 BACK PAIN OF LUMBOSACRAL REGION WITH SCIATICA: ICD-10-CM

## 2020-08-31 DIAGNOSIS — E11.59 HYPERTENSION ASSOCIATED WITH DIABETES: ICD-10-CM

## 2020-08-31 DIAGNOSIS — I15.2 HYPERTENSION ASSOCIATED WITH DIABETES: ICD-10-CM

## 2020-08-31 PROCEDURE — 3044F PR MOST RECENT HEMOGLOBIN A1C LEVEL <7.0%: ICD-10-PCS | Mod: CPTII,S$GLB,, | Performed by: FAMILY MEDICINE

## 2020-08-31 PROCEDURE — 3288F PR FALLS RISK ASSESSMENT DOCUMENTED: ICD-10-PCS | Mod: CPTII,S$GLB,, | Performed by: FAMILY MEDICINE

## 2020-08-31 PROCEDURE — 99999 PR PBB SHADOW E&M-EST. PATIENT-LVL V: CPT | Mod: PBBFAC,,, | Performed by: FAMILY MEDICINE

## 2020-08-31 PROCEDURE — 3044F HG A1C LEVEL LT 7.0%: CPT | Mod: CPTII,S$GLB,, | Performed by: FAMILY MEDICINE

## 2020-08-31 PROCEDURE — 3288F FALL RISK ASSESSMENT DOCD: CPT | Mod: CPTII,S$GLB,, | Performed by: FAMILY MEDICINE

## 2020-08-31 PROCEDURE — 3074F PR MOST RECENT SYSTOLIC BLOOD PRESSURE < 130 MM HG: ICD-10-PCS | Mod: CPTII,S$GLB,, | Performed by: FAMILY MEDICINE

## 2020-08-31 PROCEDURE — 99999 PR PBB SHADOW E&M-EST. PATIENT-LVL V: ICD-10-PCS | Mod: PBBFAC,,, | Performed by: FAMILY MEDICINE

## 2020-08-31 PROCEDURE — 3078F PR MOST RECENT DIASTOLIC BLOOD PRESSURE < 80 MM HG: ICD-10-PCS | Mod: CPTII,S$GLB,, | Performed by: FAMILY MEDICINE

## 2020-08-31 PROCEDURE — 1125F AMNT PAIN NOTED PAIN PRSNT: CPT | Mod: S$GLB,,, | Performed by: FAMILY MEDICINE

## 2020-08-31 PROCEDURE — 99214 PR OFFICE/OUTPT VISIT, EST, LEVL IV, 30-39 MIN: ICD-10-PCS | Mod: S$GLB,,, | Performed by: FAMILY MEDICINE

## 2020-08-31 PROCEDURE — 1100F PTFALLS ASSESS-DOCD GE2>/YR: CPT | Mod: CPTII,S$GLB,, | Performed by: FAMILY MEDICINE

## 2020-08-31 PROCEDURE — 3078F DIAST BP <80 MM HG: CPT | Mod: CPTII,S$GLB,, | Performed by: FAMILY MEDICINE

## 2020-08-31 PROCEDURE — 3008F BODY MASS INDEX DOCD: CPT | Mod: CPTII,S$GLB,, | Performed by: FAMILY MEDICINE

## 2020-08-31 PROCEDURE — 1100F PR PT FALLS ASSESS DOC 2+ FALLS/FALL W/INJURY/YR: ICD-10-PCS | Mod: CPTII,S$GLB,, | Performed by: FAMILY MEDICINE

## 2020-08-31 PROCEDURE — 99214 OFFICE O/P EST MOD 30 MIN: CPT | Mod: S$GLB,,, | Performed by: FAMILY MEDICINE

## 2020-08-31 PROCEDURE — 1125F PR PAIN SEVERITY QUANTIFIED, PAIN PRESENT: ICD-10-PCS | Mod: S$GLB,,, | Performed by: FAMILY MEDICINE

## 2020-08-31 PROCEDURE — 3008F PR BODY MASS INDEX (BMI) DOCUMENTED: ICD-10-PCS | Mod: CPTII,S$GLB,, | Performed by: FAMILY MEDICINE

## 2020-08-31 PROCEDURE — 1159F MED LIST DOCD IN RCRD: CPT | Mod: S$GLB,,, | Performed by: FAMILY MEDICINE

## 2020-08-31 PROCEDURE — 3074F SYST BP LT 130 MM HG: CPT | Mod: CPTII,S$GLB,, | Performed by: FAMILY MEDICINE

## 2020-08-31 PROCEDURE — 1159F PR MEDICATION LIST DOCUMENTED IN MEDICAL RECORD: ICD-10-PCS | Mod: S$GLB,,, | Performed by: FAMILY MEDICINE

## 2020-08-31 RX ORDER — TRAZODONE HYDROCHLORIDE 50 MG/1
50 TABLET ORAL NIGHTLY
Qty: 30 TABLET | Refills: 11 | Status: ON HOLD | OUTPATIENT
Start: 2020-08-31 | End: 2020-11-17

## 2020-09-02 NOTE — PROGRESS NOTES
Subjective:       Patient ID: Richard Bustos is a 74 y.o. male.    Chief Complaint: Diabetes, Hypertension, Hyperlipidemia, Coronary Artery Disease, Gastroesophageal Reflux, and Hypercoagulable state    Patient presents here for 6 month follow-up of diabetes, hypertension, hyperlipidemia, CAD, GERD, hypercoagulable state, and back pain.  His weight has decreased 18 lb in the last 6 months and he has been following a low-carbohydrate diet.  I congratulated him on his significant weight lost and strongly encouraged him to continue following the diet for continued weight loss.  His diabetes is under good control and his most recent hemoglobin A1c was performed on 05/20/2020 and was 6.0%.  He is on diet control for his diabetes and this is doing well.  He does have diabetic nephropathy min no diabetic neuropathy or retinopathy.  His hypertension is well controlled with his present medications and he is tolerating his medications well.  His hyperlipidemia is also well controlled but his last lipid profile was a year ago and this needs to be repeated.  He states he is following his low-sodium, low-fat low-cholesterol diet much better now and this is evidenced by the fact that he has lost 18 lb in the last 6 months.  His coronary artery disease is stable without any recent chest pains or palpitations.  He has been followed by  and his notes were reviewed.  He has also been followed by Dr. Ortiz for anemia as well as MTHFR mutation.  Due to this mutation, he does require anticoagulation with Coumadin to prevent venous thromboembolism.  His INR has been therapeutic and he is followed on the Coumadin clinic.  He has also been followed by Dr. Moss for his history of prostate cancer and this remains stable at this time and in remission.  He has also been followed by Dr. Bose with pain management.  His notes are reviewed.  The patient is presently on tramadol for pain control.  His main complaint at this time is  the fact that he is not sleeping.  He states he has trouble going to sleep but also trouble staying asleep when he does finally go to sleep.  I have suggested that he give a trial of trazodone does help him sleep and he is agreeable with this.  As far as health maintenance, he is up-to-date with all of his recommended screening exams and immunizations with the exception of shingles vaccine and lipid profile.    Diabetes  He presents for his follow-up diabetic visit. He has type 2 diabetes mellitus. His disease course has been stable. Pertinent negatives for hypoglycemia include no dizziness, headaches or nervousness/anxiousness. Pertinent negatives for diabetes include no chest pain, no fatigue, no polydipsia and no polyuria. Symptoms are stable. Diabetic complications include heart disease and nephropathy. Pertinent negatives for diabetic complications include no CVA or peripheral neuropathy. Risk factors for coronary artery disease include diabetes mellitus, dyslipidemia, hypertension, male sex, obesity and sedentary lifestyle. Current diabetic treatment includes diet. He is compliant with treatment most of the time. His weight is decreasing steadily. He is following a diabetic, low fat/cholesterol and low salt diet. Meal planning includes avoidance of concentrated sweets (Low-carbohydrate diet). He has had a previous visit with a dietitian. He rarely participates in exercise. There is no change in his home blood glucose trend. An ACE inhibitor/angiotensin II receptor blocker is being taken. He does not see a podiatrist.Eye exam is current.   Hypertension  This is a chronic problem. The problem is unchanged. The problem is controlled. Pertinent negatives include no chest pain, headaches, neck pain, palpitations or shortness of breath. Risk factors for coronary artery disease include diabetes mellitus, dyslipidemia, male gender, obesity and sedentary lifestyle. Past treatments include ACE inhibitors and beta  blockers. The current treatment provides moderate improvement. Compliance problems include exercise.  Hypertensive end-organ damage includes kidney disease and CAD/MI. There is no history of CVA or heart failure. Identifiable causes of hypertension include chronic renal disease.   Hyperlipidemia  This is a chronic problem. The problem is controlled. Recent lipid tests were reviewed and are normal. Exacerbating diseases include chronic renal disease, diabetes and obesity. Factors aggravating his hyperlipidemia include beta blockers. Pertinent negatives include no chest pain, myalgias or shortness of breath. Current antihyperlipidemic treatment includes statins. The current treatment provides significant improvement of lipids. Compliance problems include adherence to exercise.  Risk factors for coronary artery disease include diabetes mellitus, dyslipidemia, hypertension, male sex, obesity and a sedentary lifestyle.     Review of Systems   Constitutional: Positive for unexpected weight change (Weight is decreased 18 lb in 6 months). Negative for chills, fatigue and fever.   HENT: Negative for nasal congestion, ear pain, postnasal drip and sore throat.    Respiratory: Negative for cough and shortness of breath.    Cardiovascular: Negative for chest pain and palpitations.   Gastrointestinal: Negative for abdominal pain, diarrhea, nausea and vomiting.   Endocrine: Negative for polydipsia and polyuria.   Genitourinary: Negative for difficulty urinating, dysuria, flank pain and frequency.        Nocturia x2 per night   Musculoskeletal: Positive for arthralgias and back pain. Negative for myalgias and neck pain.   Neurological: Negative for dizziness, syncope, light-headedness and headaches.   Psychiatric/Behavioral: Positive for sleep disturbance. The patient is not nervous/anxious.          Objective:      Physical Exam  Vitals signs reviewed.   Constitutional:       General: He is not in acute distress.     Appearance:  Normal appearance. He is well-developed. He is obese.   HENT:      Right Ear: Tympanic membrane and external ear normal.      Left Ear: Tympanic membrane and external ear normal.      Mouth/Throat:      Pharynx: Oropharynx is clear. No oropharyngeal exudate.   Neck:      Musculoskeletal: Normal range of motion and neck supple.      Thyroid: No thyromegaly.   Cardiovascular:      Rate and Rhythm: Normal rate and regular rhythm.      Heart sounds: Normal heart sounds. No murmur.   Pulmonary:      Effort: Pulmonary effort is normal.      Breath sounds: Normal breath sounds. No wheezing or rales.   Musculoskeletal: Normal range of motion.         General: No swelling.      Right lower leg: No edema.      Left lower leg: No edema.   Lymphadenopathy:      Cervical: No cervical adenopathy.   Neurological:      General: No focal deficit present.      Mental Status: He is alert and oriented to person, place, and time.      Cranial Nerves: No cranial nerve deficit.      Deep Tendon Reflexes: Reflexes are normal and symmetric.   Psychiatric:         Mood and Affect: Mood normal.         Behavior: Behavior normal.         Assessment:       1. Type 2 diabetes mellitus with stage 3 chronic kidney disease, without long-term current use of insulin    2. Hypertension associated with diabetes    3. Mixed hyperlipidemia    4. Atherosclerosis of native coronary artery of native heart with angina pectoris    5. MTHFR mutation (methylenetetrahydrofolate reductase)    6. CKD (chronic kidney disease) stage 3, GFR 30-59 ml/min, 41    7. Hypercoagulable state, Prothrombin mutation    8. Gastroesophageal reflux disease without esophagitis    9. Morbid obesity, BMI 42.8    10. Anemia, chronic disease    11. MCI (mild cognitive impairment)    12. Back pain of lumbosacral region with sciatica        Plan:       1.  Continue present medication as his diabetes, hypertension, hyperlipidemia, CAD, GERD, hypercoagulable state, chronic kidney disease,  and back pain are all stable and controlled  2.  Continue diabetic, low-sodium, low-fat low-cholesterol diet for continued weight loss  3.  Lipid profile  4.  Trial of trazodone 50 mg at bedtime for the next week.  If he is still not sleeping at the end of the week I have instructed him to increase it to 100 mg nightly.  If this still does not help, he is to call me  5.  Continue follow-up with the multiple consultants as noted above  6.  Follow up with me in 6 months or p.r.n.        Patient readiness: acceptance and barriers:none    During the course of the visit the patient was educated and counseled about the following:     Diabetes:  Addressed ADA diet.  Suggested low cholesterol diet.  Encouraged aerobic exercise.  Discussed foot care.  Reminded to get yearly retinal exam.  Discussed ways to avoid symptomatic hypoglycemia.  Discussed sick day management.  Reminded to bring in blood sugar diary at next visit.  Follow up in 6 months or as needed.  Hypertension:   Dietary sodium restriction.  Regular aerobic exercise.  Follow up: 6 months and as needed.  Obesity:   General weight loss/lifestyle modification strategies discussed (elicit support from others; identify saboteurs; non-food rewards, etc).  Diet interventions: moderate (500 kCal/d) deficit diet.  Regular aerobic exercise program discussed.    Goals: Diabetes: Maintain Hemoglobin A1C below 7, Hypertension: Reduce Blood Pressure and Obesity: Reduce calorie intake and BMI    Did patient meet goals/outcomes: Yes    The following self management tools provided: blood pressure log  blood glucose log    Patient Instructions (the written plan) was given to the patient/family.     Time spent with patient: 45 minutes    Barriers to medications present (no )    Adverse reactions to current medications (no)    Over the counter medications reviewed (Yes)

## 2020-09-04 ENCOUNTER — PATIENT OUTREACH (OUTPATIENT)
Dept: ADMINISTRATIVE | Facility: OTHER | Age: 75
End: 2020-09-04

## 2020-09-04 NOTE — PROGRESS NOTES
Chart was reviewed for overdue Proactive [facility] Encounters ([facility])  topics  Updates were requested from care everywhere  Health Maintenance has been updated  [place] immunization registry triggered

## 2020-09-08 ENCOUNTER — OFFICE VISIT (OUTPATIENT)
Dept: PAIN MEDICINE | Facility: CLINIC | Age: 75
End: 2020-09-08
Payer: MEDICARE

## 2020-09-08 VITALS — DIASTOLIC BLOOD PRESSURE: 77 MMHG | HEART RATE: 60 BPM | SYSTOLIC BLOOD PRESSURE: 125 MMHG

## 2020-09-08 DIAGNOSIS — M47.896 OTHER SPONDYLOSIS, LUMBAR REGION: ICD-10-CM

## 2020-09-08 DIAGNOSIS — M48.00 CENTRAL STENOSIS OF SPINAL CANAL: Primary | ICD-10-CM

## 2020-09-08 DIAGNOSIS — M51.36 DDD (DEGENERATIVE DISC DISEASE), LUMBAR: ICD-10-CM

## 2020-09-08 PROCEDURE — 1100F PTFALLS ASSESS-DOCD GE2>/YR: CPT | Mod: CPTII,S$GLB,, | Performed by: ANESTHESIOLOGY

## 2020-09-08 PROCEDURE — 99999 PR PBB SHADOW E&M-EST. PATIENT-LVL II: CPT | Mod: PBBFAC,,, | Performed by: ANESTHESIOLOGY

## 2020-09-08 PROCEDURE — 99214 OFFICE O/P EST MOD 30 MIN: CPT | Mod: S$GLB,,, | Performed by: ANESTHESIOLOGY

## 2020-09-08 PROCEDURE — 1159F PR MEDICATION LIST DOCUMENTED IN MEDICAL RECORD: ICD-10-PCS | Mod: S$GLB,,, | Performed by: ANESTHESIOLOGY

## 2020-09-08 PROCEDURE — 3078F PR MOST RECENT DIASTOLIC BLOOD PRESSURE < 80 MM HG: ICD-10-PCS | Mod: CPTII,S$GLB,, | Performed by: ANESTHESIOLOGY

## 2020-09-08 PROCEDURE — 1159F MED LIST DOCD IN RCRD: CPT | Mod: S$GLB,,, | Performed by: ANESTHESIOLOGY

## 2020-09-08 PROCEDURE — 3078F DIAST BP <80 MM HG: CPT | Mod: CPTII,S$GLB,, | Performed by: ANESTHESIOLOGY

## 2020-09-08 PROCEDURE — 3074F SYST BP LT 130 MM HG: CPT | Mod: CPTII,S$GLB,, | Performed by: ANESTHESIOLOGY

## 2020-09-08 PROCEDURE — 99214 PR OFFICE/OUTPT VISIT, EST, LEVL IV, 30-39 MIN: ICD-10-PCS | Mod: S$GLB,,, | Performed by: ANESTHESIOLOGY

## 2020-09-08 PROCEDURE — 1125F PR PAIN SEVERITY QUANTIFIED, PAIN PRESENT: ICD-10-PCS | Mod: S$GLB,,, | Performed by: ANESTHESIOLOGY

## 2020-09-08 PROCEDURE — 3288F PR FALLS RISK ASSESSMENT DOCUMENTED: ICD-10-PCS | Mod: CPTII,S$GLB,, | Performed by: ANESTHESIOLOGY

## 2020-09-08 PROCEDURE — 3288F FALL RISK ASSESSMENT DOCD: CPT | Mod: CPTII,S$GLB,, | Performed by: ANESTHESIOLOGY

## 2020-09-08 PROCEDURE — 1125F AMNT PAIN NOTED PAIN PRSNT: CPT | Mod: S$GLB,,, | Performed by: ANESTHESIOLOGY

## 2020-09-08 PROCEDURE — 99999 PR PBB SHADOW E&M-EST. PATIENT-LVL II: ICD-10-PCS | Mod: PBBFAC,,, | Performed by: ANESTHESIOLOGY

## 2020-09-08 PROCEDURE — 1100F PR PT FALLS ASSESS DOC 2+ FALLS/FALL W/INJURY/YR: ICD-10-PCS | Mod: CPTII,S$GLB,, | Performed by: ANESTHESIOLOGY

## 2020-09-08 PROCEDURE — 3074F PR MOST RECENT SYSTOLIC BLOOD PRESSURE < 130 MM HG: ICD-10-PCS | Mod: CPTII,S$GLB,, | Performed by: ANESTHESIOLOGY

## 2020-09-08 RX ORDER — HYDROCODONE BITARTRATE AND ACETAMINOPHEN 5; 325 MG/1; MG/1
1 TABLET ORAL EVERY 8 HOURS PRN
Qty: 90 TABLET | Refills: 0 | Status: SHIPPED | OUTPATIENT
Start: 2020-09-08 | End: 2021-04-16

## 2020-09-08 NOTE — H&P (VIEW-ONLY)
FOLLOW UP NOTE:     CHIEF COMPLAINT: back and knee pain    INITIAL HISTORY OF PRESENT ILLNESS: Richard Bustos is a 74 y.o. male with PMH significant for CAD s/p PCI (ASA and Plavix), atrial fibrllation on coumadin, hx of bilateral hip replacements, CKD, HTN, and DORA presents for the evaluation of low back pain. Patient was last seen in the [facility] in 8/2017 where he received a bilateral L3, L4, and L5 RFA with great benefit. The patient presents today reporting of a recurrence of his low back pain that was well treated with last RFA. Patient localizes his pain to the center of his lower back without radiation. Patient describes his pain as an aching, burning, and throbbing type of pain. Patient reports that his current pain is a 7/10. Patient reports that his low back pain makes it difficult for him to ambulate. Patient denies of any urinary/fecal incontinence, saddle anesthesia, or weakness. Patient reports of requiring admission to the hospital for heat exhaustion but denies of any other significant interval changes in his health since his RFA.      Aggravating factors: worse in the AM, standing, and walking     Mitigating factors: RFA    INTERVAL HISTORY OF PRESENT ILLNESS: Richard Bustos is a 75 y.o. male with PMH significant for CAD s/p PCI (ASA and Plavix), atrial fibrllation on coumadin, hx of bilateral hip replacements, CKD, HTN, and DORA presents as an established patient for the continued management of low back pain. Today, the patient reports of low back and knee pain that bother him equally. The patient reports of benefit with previously lumbar spine interventions in the past but reports of minimal benefit with Tramadol. The patient denies of any significant changes in his health since his last appointment. The patient also denies of any changes in the character of his pain since his last appointment. Patient denies of any urinary/fecal incontinence, saddle anesthesia, or weakness.       INTERVENTIONAL PAIN HISTORY:  6/22/2020: L5-S1 lumbar interlaminar epidural steroid injection - at least 50% relief  2/17/2020: Left knee intra-articular knee injection - good relief  1/10/2020: Radiofrequency ablation of the L3, L4, L5 medial branch nerves on the bilateral-side - 50% relief  8/3/2017: RFA at L3, 4, 5 - reports 80% relief    CURRENT PAIN MEDICATIONS:   Tramadol 50 mg PO BID for pain - mild benefit     ROS:  Review of Systems   Constitutional: Negative for chills and fever.   HENT: Negative for tinnitus.    Eyes: Negative for visual disturbance.   Respiratory: Negative for shortness of breath.    Cardiovascular: Negative for chest pain.   Gastrointestinal: Negative for nausea and vomiting.   Genitourinary: Negative for difficulty urinating.   Musculoskeletal: Positive for arthralgias and back pain.   Skin: Negative for rash.   Allergic/Immunologic: Negative for immunocompromised state.   Neurological: Negative for syncope.   Hematological: Does not bruise/bleed easily.   Psychiatric/Behavioral: Negative for suicidal ideas.        MEDICAL, SURGICAL, FAMILY, SOCIAL HX: reviewed    MEDICATIONS/ALLERGIES: reviewed    PHYSICAL EXAM:    VITALS: Vitals reviewed.   Vitals:    09/08/20 1530 09/08/20 1531   BP: 125/77    Pulse: 60    PainSc:    5       Physical Exam   Constitutional: He is oriented to person, place, and time and well-developed, well-nourished, and in no distress.   HENT:   Head: Normocephalic and atraumatic.   Eyes: Conjunctivae and EOM are normal. Right eye exhibits no discharge. Left eye exhibits no discharge.   Cardiovascular: Normal rate.   Pulmonary/Chest: Effort normal and breath sounds normal. No respiratory distress.   Abdominal: Soft.   Neurological: He is alert and oriented to person, place, and time.   Skin: Skin is warm and dry. No rash noted. He is not diaphoretic.   Psychiatric: Mood, memory, affect and judgment normal.   Nursing note and vitals reviewed.       UPPER  EXTREMITIES: Normal alignment, normal range of motion, no atrophy, no skin changes,  hair growth and nail growth normal and equal bilaterally. No swelling, no tenderness.    LOWER EXTREMITIES:  Normal alignment, normal range of motion, no atrophy, no skin changes,  hair growth and nail growth normal and equal bilaterally. No swelling, no tenderness.     LUMBAR SPINE  Lumbar spine: ROM is significantly limited with flexion extension and oblique extension with increased pain.    Supine straight leg raise: unable to perform secondary to fall risk   ((+)) Facet loading bilaterally  Internal and external rotation of the hips: unable to perform secondary to fall risk  Myofascial exam: No tenderness to palpation across lumbar paraspinous muscles.     MOTOR: Tone and bulk: normal bilateral upper and lower Strength: normal   Delt      Bi         Tri        WE      WF                        R          5          5          5          5          5          5            L          5          5          5          5          5          5               IP         ADD     ABD     Quad   TA        Gas      HAM  R          5          5          5          5          5          5          5  L          5          5          5          5          5          5          5     SENSATION: Light touch and pinprick intact bilaterally  REFLEXES: normal, symmetric, nonbrisk.  Toes down, no clonus. Negative roca's sign bilaterally.  GAIT: normal rise, base, steps, and arm swing.      IMAGING: no new imaging of the spine or knee to review    ASSESSMENT: Richard Bustos is a 75 y.o. male with PMH significant for CAD s/p PCI (ASA and Plavix), atrial fibrllation on coumadin, hx of bilateral hip replacements, CKD, HTN, and DORA presents as an established patient for the continued management of low back pain. The patient reports of benefit with previously lumbar spine interventions in the past but reports of minimal benefit with Tramadol.  Treatment plan outlined below.     PLAN:  1. Schedule for repeat L5-S1 lumbar interlaminar epidural steroid injection as the patient reports of benefit in the past. The patient will require clearance to hold his coumadin prior to his STARLA.   2. Prescribed Norco 5-325 mg PO q 8 hr PRN for breakthrough pain; #90 tablets; 0 refills.  without discrepancies.   3. UDS to be collected at the next clinic visit  4. I have stressed the importance of physical activity and a home exercise plan to help with chronic pain and improve health.  5. RTC for the procedure as outlined above    Evangelist Bose MD  Pain Management

## 2020-09-08 NOTE — PROGRESS NOTES
FOLLOW UP NOTE:     CHIEF COMPLAINT: back and knee pain    INITIAL HISTORY OF PRESENT ILLNESS: Richard Bustos is a 74 y.o. male with PMH significant for CAD s/p PCI (ASA and Plavix), atrial fibrllation on coumadin, hx of bilateral hip replacements, CKD, HTN, and DORA presents for the evaluation of low back pain. Patient was last seen in the [facility] in 8/2017 where he received a bilateral L3, L4, and L5 RFA with great benefit. The patient presents today reporting of a recurrence of his low back pain that was well treated with last RFA. Patient localizes his pain to the center of his lower back without radiation. Patient describes his pain as an aching, burning, and throbbing type of pain. Patient reports that his current pain is a 7/10. Patient reports that his low back pain makes it difficult for him to ambulate. Patient denies of any urinary/fecal incontinence, saddle anesthesia, or weakness. Patient reports of requiring admission to the hospital for heat exhaustion but denies of any other significant interval changes in his health since his RFA.      Aggravating factors: worse in the AM, standing, and walking     Mitigating factors: RFA    INTERVAL HISTORY OF PRESENT ILLNESS: Richard Bustos is a 75 y.o. male with PMH significant for CAD s/p PCI (ASA and Plavix), atrial fibrllation on coumadin, hx of bilateral hip replacements, CKD, HTN, and DORA presents as an established patient for the continued management of low back pain. Today, the patient reports of low back and knee pain that bother him equally. The patient reports of benefit with previously lumbar spine interventions in the past but reports of minimal benefit with Tramadol. The patient denies of any significant changes in his health since his last appointment. The patient also denies of any changes in the character of his pain since his last appointment. Patient denies of any urinary/fecal incontinence, saddle anesthesia, or weakness.       INTERVENTIONAL PAIN HISTORY:  6/22/2020: L5-S1 lumbar interlaminar epidural steroid injection - at least 50% relief  2/17/2020: Left knee intra-articular knee injection - good relief  1/10/2020: Radiofrequency ablation of the L3, L4, L5 medial branch nerves on the bilateral-side - 50% relief  8/3/2017: RFA at L3, 4, 5 - reports 80% relief    CURRENT PAIN MEDICATIONS:   Tramadol 50 mg PO BID for pain - mild benefit     ROS:  Review of Systems   Constitutional: Negative for chills and fever.   HENT: Negative for tinnitus.    Eyes: Negative for visual disturbance.   Respiratory: Negative for shortness of breath.    Cardiovascular: Negative for chest pain.   Gastrointestinal: Negative for nausea and vomiting.   Genitourinary: Negative for difficulty urinating.   Musculoskeletal: Positive for arthralgias and back pain.   Skin: Negative for rash.   Allergic/Immunologic: Negative for immunocompromised state.   Neurological: Negative for syncope.   Hematological: Does not bruise/bleed easily.   Psychiatric/Behavioral: Negative for suicidal ideas.        MEDICAL, SURGICAL, FAMILY, SOCIAL HX: reviewed    MEDICATIONS/ALLERGIES: reviewed    PHYSICAL EXAM:    VITALS: Vitals reviewed.   Vitals:    09/08/20 1530 09/08/20 1531   BP: 125/77    Pulse: 60    PainSc:    5       Physical Exam   Constitutional: He is oriented to person, place, and time and well-developed, well-nourished, and in no distress.   HENT:   Head: Normocephalic and atraumatic.   Eyes: Conjunctivae and EOM are normal. Right eye exhibits no discharge. Left eye exhibits no discharge.   Cardiovascular: Normal rate.   Pulmonary/Chest: Effort normal and breath sounds normal. No respiratory distress.   Abdominal: Soft.   Neurological: He is alert and oriented to person, place, and time.   Skin: Skin is warm and dry. No rash noted. He is not diaphoretic.   Psychiatric: Mood, memory, affect and judgment normal.   Nursing note and vitals reviewed.       UPPER  EXTREMITIES: Normal alignment, normal range of motion, no atrophy, no skin changes,  hair growth and nail growth normal and equal bilaterally. No swelling, no tenderness.    LOWER EXTREMITIES:  Normal alignment, normal range of motion, no atrophy, no skin changes,  hair growth and nail growth normal and equal bilaterally. No swelling, no tenderness.     LUMBAR SPINE  Lumbar spine: ROM is significantly limited with flexion extension and oblique extension with increased pain.    Supine straight leg raise: unable to perform secondary to fall risk   ((+)) Facet loading bilaterally  Internal and external rotation of the hips: unable to perform secondary to fall risk  Myofascial exam: No tenderness to palpation across lumbar paraspinous muscles.     MOTOR: Tone and bulk: normal bilateral upper and lower Strength: normal   Delt      Bi         Tri        WE      WF                        R          5          5          5          5          5          5            L          5          5          5          5          5          5               IP         ADD     ABD     Quad   TA        Gas      HAM  R          5          5          5          5          5          5          5  L          5          5          5          5          5          5          5     SENSATION: Light touch and pinprick intact bilaterally  REFLEXES: normal, symmetric, nonbrisk.  Toes down, no clonus. Negative roca's sign bilaterally.  GAIT: normal rise, base, steps, and arm swing.      IMAGING: no new imaging of the spine or knee to review    ASSESSMENT: Richard Bustos is a 75 y.o. male with PMH significant for CAD s/p PCI (ASA and Plavix), atrial fibrllation on coumadin, hx of bilateral hip replacements, CKD, HTN, and DORA presents as an established patient for the continued management of low back pain. The patient reports of benefit with previously lumbar spine interventions in the past but reports of minimal benefit with Tramadol.  Treatment plan outlined below.     PLAN:  1. Schedule for repeat L5-S1 lumbar interlaminar epidural steroid injection as the patient reports of benefit in the past. The patient will require clearance to hold his coumadin prior to his STRALA.   2. Prescribed Norco 5-325 mg PO q 8 hr PRN for breakthrough pain; #90 tablets; 0 refills.  without discrepancies.   3. UDS to be collected at the next clinic visit  4. I have stressed the importance of physical activity and a home exercise plan to help with chronic pain and improve health.  5. RTC for the procedure as outlined above    Evangelist Bose MD  Pain Management

## 2020-09-09 DIAGNOSIS — M51.36 DDD (DEGENERATIVE DISC DISEASE), LUMBAR: Primary | ICD-10-CM

## 2020-09-09 DIAGNOSIS — Z01.818 PREOP TESTING: ICD-10-CM

## 2020-09-10 ENCOUNTER — PATIENT MESSAGE (OUTPATIENT)
Dept: PAIN MEDICINE | Facility: CLINIC | Age: 75
End: 2020-09-10

## 2020-09-14 ENCOUNTER — TELEPHONE (OUTPATIENT)
Dept: PAIN MEDICINE | Facility: CLINIC | Age: 75
End: 2020-09-14

## 2020-09-14 NOTE — TELEPHONE ENCOUNTER
----- Message from Rosangela Arredondo PharmD sent at 9/14/2020  1:39 PM CDT -----  Can you please notify the [facility] if pt will need to be off warfarin for the upcoming STARLA on 9/21/20?    Thank you  Rosangela Arredondo, Pharm D  [facility]  (P)[phone]  (F) [phone]

## 2020-09-18 ENCOUNTER — LAB VISIT (OUTPATIENT)
Dept: PRIMARY CARE CLINIC | Facility: CLINIC | Age: 75
End: 2020-09-18
Payer: MEDICARE

## 2020-09-18 DIAGNOSIS — Z01.818 PREOP TESTING: ICD-10-CM

## 2020-09-18 PROCEDURE — U0003 INFECTIOUS AGENT DETECTION BY NUCLEIC ACID (DNA OR RNA); SEVERE ACUTE RESPIRATORY SYNDROME CORONAVIRUS 2 (SARS-COV-2) (CORONAVIRUS DISEASE [COVID-19]), AMPLIFIED PROBE TECHNIQUE, MAKING USE OF HIGH THROUGHPUT TECHNOLOGIES AS DESCRIBED BY CMS-2020-01-R: HCPCS

## 2020-09-18 NOTE — DISCHARGE INSTRUCTIONS
Before leaving, please make sure you have all your personal belongings such as glasses, purses, wallets, keys, cell phones, jewelry, jackets etc     Pain injection instructions:     This procedure may take a couple weeks to relieve pain  You may get some pain relief from the local anesthetic initally.   Steroids can have side effects of flushed face or nervous feeling.    No driving for 24 hrs.   Activity as tolerated- gradually increase activities.  Dont lift over 10 lbs for 24 hrs   No heat at injection sites for 2 full days. No heating pads, hot tubs, saunas, or swimming in any body of water or pool for 2 full days.  Use ice pack for mild swelling and for comfort , apply for 20 minutes, remove for 20 minute intervals. No direct contact of ice itself  to skin.  May shower today.  Do not allow shower water to beat on injections site(s) for 2 full days. No tub baths for two full days.      Resume Aspirin, Plavix, or Coumadin the day after the procedure unless otherwise instructed.   If diabetic,monitor your glucose carefully as steroids can increase your glucose level    Seek immediate medical help for:   Severe increase in your usual pain or appearance of new pain.  Prolonged (more than 8 hours) or increasing weakness or numbness in the legs or arms. Numbing medicine was injected and can affect the messages to and from the brain and legs or arms.  .    Fever above 100.4 degrees F ,Drainage,redness,active bleeding, or increased swelling at the injection site.  Headache, shortness of breath, chest pain, or breathing problems.    After Surgery:  Always be aware that any surgery can cause these symptoms:    Pain- Medication can be prescribed for pain to decrease your pain but may not completely take your pain away. Over the Counter pain medicine my be enough and you can always use Ice and rest to help ease pain.    Bleeding- a little bleeding after a surgery is usually within normal.  If there is a lot of blood you  need to notify your MD.  Emergency treatments of bleeding are cold application, elevation of the bleeding site and compression.    Infection- Infection after surgery is NOT a normal occurrence.  Signs of infection are fever, swelling, hot to touch the incision.  If this occurs notify your MD immediately.    Nausea- this can be common after a surgery especially if you have had anesthesia medicine or are taking pain medicine.  Steroids have a side effect of nausea sometimes. Staying on clear liquids, bland foods, gingerale, or over the counter anti nausea medicines can help.  If you vomit more than once, notify your MD.  Anti Nausea medicines can be prescribed.

## 2020-09-19 LAB — SARS-COV-2 RNA RESP QL NAA+PROBE: NOT DETECTED

## 2020-09-20 DIAGNOSIS — I25.10 CORONARY ARTERY DISEASE, ANGINA PRESENCE UNSPECIFIED, UNSPECIFIED VESSEL OR LESION TYPE, UNSPECIFIED WHETHER NATIVE OR TRANSPLANTED HEART: ICD-10-CM

## 2020-09-20 DIAGNOSIS — I25.10 CORONARY ARTERY DISEASE INVOLVING NATIVE CORONARY ARTERY OF NATIVE HEART WITHOUT ANGINA PECTORIS: Chronic | ICD-10-CM

## 2020-09-21 ENCOUNTER — LAB VISIT (OUTPATIENT)
Dept: LAB | Facility: HOSPITAL | Age: 75
End: 2020-09-21
Attending: ANESTHESIOLOGY
Payer: MEDICARE

## 2020-09-21 ENCOUNTER — HOSPITAL ENCOUNTER (OUTPATIENT)
Facility: AMBULARY SURGERY CENTER | Age: 75
Discharge: HOME | End: 2020-09-21
Attending: ANESTHESIOLOGY | Admitting: ANESTHESIOLOGY
Payer: MEDICARE

## 2020-09-21 DIAGNOSIS — M51.36 DEGENERATIVE DISC DISEASE, LUMBAR: Primary | ICD-10-CM

## 2020-09-21 DIAGNOSIS — Z79.01 ANTICOAGULATED ON COUMADIN: ICD-10-CM

## 2020-09-21 DIAGNOSIS — Z79.01 ANTICOAGULATED ON COUMADIN: Primary | ICD-10-CM

## 2020-09-21 LAB
INR PPP: 1 (ref 0.8–1.2)
PROTHROMBIN TIME: 10.4 SEC (ref 9–12.5)

## 2020-09-21 PROCEDURE — 85610 PROTHROMBIN TIME: CPT

## 2020-09-21 PROCEDURE — 36415 COLL VENOUS BLD VENIPUNCTURE: CPT

## 2020-09-21 PROCEDURE — 62323 PR INJ LUMBAR/SACRAL, W/IMAGING GUIDANCE: ICD-10-PCS | Mod: ,,, | Performed by: ANESTHESIOLOGY

## 2020-09-21 PROCEDURE — 62323 NJX INTERLAMINAR LMBR/SAC: CPT | Mod: ,,, | Performed by: ANESTHESIOLOGY

## 2020-09-21 PROCEDURE — 62323 NJX INTERLAMINAR LMBR/SAC: CPT | Performed by: ANESTHESIOLOGY

## 2020-09-21 RX ORDER — NITROGLYCERIN 400 UG/1
SPRAY ORAL
Qty: 4.9 G | Refills: 9 | Status: ON HOLD | OUTPATIENT
Start: 2020-09-21 | End: 2023-04-28 | Stop reason: HOSPADM

## 2020-09-21 RX ORDER — FUROSEMIDE 40 MG/1
40 TABLET ORAL DAILY PRN
Qty: 90 TABLET | Refills: 3 | Status: ON HOLD | OUTPATIENT
Start: 2020-09-21 | End: 2020-11-17

## 2020-09-21 RX ORDER — CLOPIDOGREL BISULFATE 75 MG/1
75 TABLET ORAL DAILY
Qty: 90 TABLET | Refills: 3 | Status: SHIPPED | OUTPATIENT
Start: 2020-09-21 | End: 2021-09-27

## 2020-09-21 RX ORDER — SODIUM CHLORIDE 9 MG/ML
INJECTION, SOLUTION INTRAMUSCULAR; INTRAVENOUS; SUBCUTANEOUS
Status: DISCONTINUED | OUTPATIENT
Start: 2020-09-21 | End: 2020-09-21 | Stop reason: HOSPADM

## 2020-09-21 RX ORDER — DEXAMETHASONE SODIUM PHOSPHATE 10 MG/ML
INJECTION INTRAMUSCULAR; INTRAVENOUS
Status: DISCONTINUED | OUTPATIENT
Start: 2020-09-21 | End: 2020-09-21 | Stop reason: HOSPADM

## 2020-09-21 RX ORDER — LIDOCAINE HYDROCHLORIDE 10 MG/ML
INJECTION, SOLUTION EPIDURAL; INFILTRATION; INTRACAUDAL; PERINEURAL
Status: DISCONTINUED | OUTPATIENT
Start: 2020-09-21 | End: 2020-09-21 | Stop reason: HOSPADM

## 2020-09-21 RX ORDER — SODIUM CHLORIDE, SODIUM LACTATE, POTASSIUM CHLORIDE, CALCIUM CHLORIDE 600; 310; 30; 20 MG/100ML; MG/100ML; MG/100ML; MG/100ML
INJECTION, SOLUTION INTRAVENOUS ONCE AS NEEDED
Status: DISCONTINUED | OUTPATIENT
Start: 2020-09-21 | End: 2020-09-21 | Stop reason: HOSPADM

## 2020-09-21 NOTE — INTERVAL H&P NOTE
The patient has been examined and the H&P has been reviewed:    I concur with the findings and no changes have occurred since H&P was written.    This patient has been cleared for surgery in an ambulatory surgical facility    ASA 3,  Mallampatti Score 3  No history of anesthetic complications  Plan for RN IV sedation      Anesthesia/Surgery risks, benefits and alternative options discussed and understood by patient/family.          Active Hospital Problems    Diagnosis  POA    Degenerative disc disease, lumbar [M51.36]  Yes      Resolved Hospital Problems   No resolved problems to display.

## 2020-09-21 NOTE — OP NOTE
PROCEDURE DATE: 9/21/2020    PROCEDURE:  Lumbar interlaminar epidural steroid injection at L5-S1 under fluoroscopic guidance.    DIAGNOSIS: LUMBAR DISC DISPLACEMENT WITHOUT MYELOPATHY  POSTOP DIAGNOSIS: SAME    PHYSICIAN: Evangelist Bose M.D.  ASSISTANT: Cindi Ocampo D.O.    MEDICATIONS INJECTED: 10 mg dexamethasone with 4 ml of preservative free NaCl    LOCAL ANESTHETIC INJECTED:    Lidocaine 1% 4 ml total    SEDATION MEDICATIONS: N/A    ESTIMATED BLOOD LOSS:  none    COMPLICATIONS:  none    TECHNIQUE:  Time-out taken to identify patient and procedure prior to starting the procedure.  With the patient laying in a prone position, the area was prepped and draped in the usual sterile fashion using ChloraPrep and a fenestrated drape.  After determining the target level with an AP fluoroscopic view, local anesthetic was given using a 25-gauge 1.5 inch needle by raising a wheal and then infiltrating toward the interlaminar entry space.  A 4 inch 20-gauge Touhy needle was introduced under AP fluoroscopic guidance to the interlaminar space of L5-S1. Once the trajectory was established, the needle was visualized in the lateral view and advanced using loss of resistance technique. Once in the desired position, 2 mL contrast was injected to confirm placement and there was no vascular uptake nor intrathecal spread.  The medication was then injected slowly. The patient tolerated the procedure well.      The patient was monitored after the procedure.   They were given post-procedure and discharge instructions to follow at home.  The patient was discharged in a stable condition.

## 2020-09-21 NOTE — DISCHARGE SUMMARY
[facility]  Discharge Note  Short Stay    Admit Date: 9/21/2020    Discharge Date and Time: 9/21/2020    Attending Physician: Evangelist Bose MD     Discharge Provider: Evangelist Bose    Diagnoses:  Active Hospital Problems    Diagnosis  POA    *Degenerative disc disease, lumbar [M51.36]  Yes      Resolved Hospital Problems   No resolved problems to display.       Hospital Course: Lumbar interlaminar epidural steroid injection     Discharged Condition: Good    Final Diagnoses:   Active Hospital Problems    Diagnosis  POA    *Degenerative disc disease, lumbar [M51.36]  Yes      Resolved Hospital Problems   No resolved problems to display.       Disposition: Home or Self Care    Follow up/Patient Instructions:    Medications:  Reconciled Home Medications:      Medication List      CONTINUE taking these medications    allopurinoL 100 MG tablet  Commonly known as: ZYLOPRIM  TAKE 1 TABLET(100 MG) BY MOUTH EVERY DAY     ammonium lactate 12 % Crea  Apply twice daily to affected parts both feet as needed.     aspirin 81 MG EC tablet  Commonly known as: ECOTRIN  Take 81 mg by mouth once daily.     atorvastatin 80 MG tablet  Commonly known as: LIPITOR  TAKE 1 TABLET(80 MG) BY MOUTH EVERY DAY     calcitRIOL 0.5 MCG Cap  Commonly known as: ROCALTROL  once daily.     calcium carbonate 400 mg calcium (1,000 mg) Chew  Commonly known as: TUMS ULTRA  Take 1 tablet (400 mg total) by mouth once daily.     carvediloL 25 MG tablet  Commonly known as: COREG  TAKE 1 TABLET(25 MG) BY MOUTH TWICE DAILY WITH MEALS     CENTRUM SILVER ORAL  Take 1 tablet by mouth once daily.     ciclopirox 8 % Soln  Commonly known as: PENLAC  Apply topically nightly.     clopidogreL 75 mg tablet  Commonly known as: PLAVIX  Take 1 tablet (75 mg total) by mouth once daily.     ergocalciferol 50,000 unit Cap  Commonly known as: ERGOCALCIFEROL  ergocalciferol (vitamin D2) 1,250 mcg (50,000 unit) capsule   TK 1 C PO Q WK     famotidine 40 MG  tablet  Commonly known as: PEPCID  Take 1 tablet (40 mg total) by mouth once daily.     ferrous sulfate 325 mg (65 mg iron) Tab tablet  Commonly known as: FEOSOL  Take 1 tablet (325 mg total) by mouth 2 (two) times daily.     fluticasone propionate 50 mcg/actuation nasal spray  Commonly known as: FLONASE  SHAKE LIQUID AND USE 2 SPRAYS(100 MCG) IN EACH NOSTRIL EVERY DAY     furosemide 40 MG tablet  Commonly known as: LASIX  Take 1 tablet (40 mg total) by mouth daily as needed.     HYDROcodone-acetaminophen 5-325 mg per tablet  Commonly known as: NORCO  Take 1 tablet by mouth every 8 (eight) hours as needed for Pain.     hydrocortisone 2.5 % cream  Apply topically 2 (two) times daily. for 10 days     lisinopriL 40 MG tablet  Commonly known as: PRINIVIL,ZESTRIL  Take 1 tablet (40 mg total) by mouth every evening.     LORazepam 2 MG Tab  Commonly known as: ATIVAN  Take 1 tablet (2 mg total) by mouth as needed (pre-MRI anxiety).     magnesium oxide 400 mg (241.3 mg magnesium) tablet  Commonly known as: MAG-OX  TAKE 1 TABLET BY MOUTH EVERY DAY     mecobal-levomefolat Ca-B6 phos 3-35-2 mg Tab  Commonly known as: L-METHYL-B6-B12  Take 1 tablet by mouth 2 (two) times daily.     MYRBETRIQ 50 mg Tb24  Generic drug: mirabegron  Take 1 tablet (50 mg total) by mouth once daily.     nitroGLYCERIN 0.4 MG/DOSE TL SPRY 400 mcg/spray spray  Commonly known as: NITROLINGUAL  PLACE 1 SPRAY UNDER THE TONGUE EVERY 5 MINUTES AS NEEDED FOR CHEST PAIN     OCUVITE [id number] mg-unit-mg-mg Cap  Generic drug: vit C-vit E-lutein-min-om-3  Take 1 capsule by mouth once daily.     omeprazole 20 MG capsule  Commonly known as: PRILOSEC  TAKE 1 CAPSULE BY MOUTH DAILY     PRESERVISION AREDS ORAL  PreserVision AREDS     traMADoL 50 mg tablet  Commonly known as: ULTRAM  Take 1 tablet (50 mg total) by mouth every 8 (eight) hours as needed for Pain.     traZODone 50 MG tablet  Commonly known as: DESYREL  Take 1 tablet (50 mg total) by mouth every  evening.     warfarin 5 MG tablet  Commonly known as: COUMADIN  Current dose: Take 5mg daily or as directed by coumadin clinic          Discharge Procedure Orders   Diet general     Call MD for:  temperature >100.4     Call MD for:  persistent nausea and vomiting     Call MD for:  severe uncontrolled pain     Call MD for:  difficulty breathing, headache or visual disturbances     Call MD for:  redness, tenderness, or signs of infection (pain, swelling, redness, odor or green/yellow discharge around incision site)     Call MD for:  hives     Call MD for:  persistent dizziness or light-headedness     Call MD for:  extreme fatigue        Follow up with MD in 2-3 weeks    Discharge Procedure Orders (must include Diet, Follow-up, Activity):   Discharge Procedure Orders (must include Diet, Follow-up, Activity)   Diet general     Call MD for:  temperature >100.4     Call MD for:  persistent nausea and vomiting     Call MD for:  severe uncontrolled pain     Call MD for:  difficulty breathing, headache or visual disturbances     Call MD for:  redness, tenderness, or signs of infection (pain, swelling, redness, odor or green/yellow discharge around incision site)     Call MD for:  hives     Call MD for:  persistent dizziness or light-headedness     Call MD for:  extreme fatigue

## 2020-09-22 VITALS
DIASTOLIC BLOOD PRESSURE: 87 MMHG | SYSTOLIC BLOOD PRESSURE: 146 MMHG | OXYGEN SATURATION: 96 % | HEART RATE: 65 BPM | BODY MASS INDEX: 41.54 KG/M2 | RESPIRATION RATE: 18 BRPM | HEIGHT: 69 IN | WEIGHT: 280.44 LBS | TEMPERATURE: 98 F

## 2020-10-05 ENCOUNTER — PATIENT MESSAGE (OUTPATIENT)
Dept: FAMILY MEDICINE | Facility: CLINIC | Age: 75
End: 2020-10-05

## 2020-10-05 ENCOUNTER — PATIENT MESSAGE (OUTPATIENT)
Dept: ADMINISTRATIVE | Facility: HOSPITAL | Age: 75
End: 2020-10-05

## 2020-10-15 ENCOUNTER — PATIENT OUTREACH (OUTPATIENT)
Dept: ADMINISTRATIVE | Facility: OTHER | Age: 75
End: 2020-10-15

## 2020-10-19 ENCOUNTER — OFFICE VISIT (OUTPATIENT)
Dept: PAIN MEDICINE | Facility: CLINIC | Age: 75
End: 2020-10-19
Payer: MEDICARE

## 2020-10-19 VITALS
HEART RATE: 52 BPM | WEIGHT: 280.44 LBS | DIASTOLIC BLOOD PRESSURE: 77 MMHG | BODY MASS INDEX: 41.54 KG/M2 | SYSTOLIC BLOOD PRESSURE: 137 MMHG | HEIGHT: 69 IN

## 2020-10-19 DIAGNOSIS — G89.29 CHRONIC PAIN OF LEFT KNEE: ICD-10-CM

## 2020-10-19 DIAGNOSIS — M25.562 CHRONIC PAIN OF LEFT KNEE: ICD-10-CM

## 2020-10-19 DIAGNOSIS — M51.36 DDD (DEGENERATIVE DISC DISEASE), LUMBAR: Primary | ICD-10-CM

## 2020-10-19 DIAGNOSIS — M47.896 OTHER SPONDYLOSIS, LUMBAR REGION: ICD-10-CM

## 2020-10-19 DIAGNOSIS — F11.90 CHRONIC, CONTINUOUS USE OF OPIOIDS: ICD-10-CM

## 2020-10-19 PROCEDURE — 1101F PT FALLS ASSESS-DOCD LE1/YR: CPT | Mod: CPTII,S$GLB,, | Performed by: ANESTHESIOLOGY

## 2020-10-19 PROCEDURE — 3075F SYST BP GE 130 - 139MM HG: CPT | Mod: CPTII,S$GLB,, | Performed by: ANESTHESIOLOGY

## 2020-10-19 PROCEDURE — 3078F DIAST BP <80 MM HG: CPT | Mod: CPTII,S$GLB,, | Performed by: ANESTHESIOLOGY

## 2020-10-19 PROCEDURE — 1101F PR PT FALLS ASSESS DOC 0-1 FALLS W/OUT INJ PAST YR: ICD-10-PCS | Mod: CPTII,S$GLB,, | Performed by: ANESTHESIOLOGY

## 2020-10-19 PROCEDURE — 20610 LARGE JOINT ASPIRATION/INJECTION: L KNEE: ICD-10-PCS | Mod: LT,S$GLB,, | Performed by: ANESTHESIOLOGY

## 2020-10-19 PROCEDURE — 20610 DRAIN/INJ JOINT/BURSA W/O US: CPT | Mod: LT,S$GLB,, | Performed by: ANESTHESIOLOGY

## 2020-10-19 PROCEDURE — 3008F PR BODY MASS INDEX (BMI) DOCUMENTED: ICD-10-PCS | Mod: CPTII,S$GLB,, | Performed by: ANESTHESIOLOGY

## 2020-10-19 PROCEDURE — 3078F PR MOST RECENT DIASTOLIC BLOOD PRESSURE < 80 MM HG: ICD-10-PCS | Mod: CPTII,S$GLB,, | Performed by: ANESTHESIOLOGY

## 2020-10-19 PROCEDURE — 3008F BODY MASS INDEX DOCD: CPT | Mod: CPTII,S$GLB,, | Performed by: ANESTHESIOLOGY

## 2020-10-19 PROCEDURE — 99214 PR OFFICE/OUTPT VISIT, EST, LEVL IV, 30-39 MIN: ICD-10-PCS | Mod: 25,S$GLB,, | Performed by: ANESTHESIOLOGY

## 2020-10-19 PROCEDURE — 1159F PR MEDICATION LIST DOCUMENTED IN MEDICAL RECORD: ICD-10-PCS | Mod: S$GLB,,, | Performed by: ANESTHESIOLOGY

## 2020-10-19 PROCEDURE — 1159F MED LIST DOCD IN RCRD: CPT | Mod: S$GLB,,, | Performed by: ANESTHESIOLOGY

## 2020-10-19 PROCEDURE — 80307 DRUG TEST PRSMV CHEM ANLYZR: CPT

## 2020-10-19 PROCEDURE — 1125F PR PAIN SEVERITY QUANTIFIED, PAIN PRESENT: ICD-10-PCS | Mod: S$GLB,,, | Performed by: ANESTHESIOLOGY

## 2020-10-19 PROCEDURE — 99999 PR PBB SHADOW E&M-EST. PATIENT-LVL III: CPT | Mod: PBBFAC,,, | Performed by: ANESTHESIOLOGY

## 2020-10-19 PROCEDURE — 3075F PR MOST RECENT SYSTOLIC BLOOD PRESS GE 130-139MM HG: ICD-10-PCS | Mod: CPTII,S$GLB,, | Performed by: ANESTHESIOLOGY

## 2020-10-19 PROCEDURE — 1125F AMNT PAIN NOTED PAIN PRSNT: CPT | Mod: S$GLB,,, | Performed by: ANESTHESIOLOGY

## 2020-10-19 PROCEDURE — 99214 OFFICE O/P EST MOD 30 MIN: CPT | Mod: 25,S$GLB,, | Performed by: ANESTHESIOLOGY

## 2020-10-19 PROCEDURE — 99999 PR PBB SHADOW E&M-EST. PATIENT-LVL III: ICD-10-PCS | Mod: PBBFAC,,, | Performed by: ANESTHESIOLOGY

## 2020-10-19 RX ORDER — HYDROCODONE BITARTRATE AND ACETAMINOPHEN 5; 325 MG/1; MG/1
1 TABLET ORAL EVERY 8 HOURS PRN
Qty: 90 TABLET | Refills: 0 | Status: SHIPPED | OUTPATIENT
Start: 2020-10-19 | End: 2021-02-08 | Stop reason: SDUPTHER

## 2020-10-19 RX ORDER — METHYLPREDNISOLONE ACETATE 40 MG/ML
40 INJECTION, SUSPENSION INTRA-ARTICULAR; INTRALESIONAL; INTRAMUSCULAR; SOFT TISSUE
Status: DISCONTINUED | OUTPATIENT
Start: 2020-10-19 | End: 2020-10-19 | Stop reason: HOSPADM

## 2020-10-19 RX ADMIN — METHYLPREDNISOLONE ACETATE 40 MG: 40 INJECTION, SUSPENSION INTRA-ARTICULAR; INTRALESIONAL; INTRAMUSCULAR; SOFT TISSUE at 09:10

## 2020-10-19 NOTE — PROGRESS NOTES
FOLLOW UP NOTE:     CHIEF COMPLAINT: left knee pain    INITIAL HISTORY OF PRESENT ILLNESS: Richard Bustos is a 74 y.o. male with PMH significant for CAD s/p PCI (ASA and Plavix), atrial fibrllation on coumadin, hx of bilateral hip replacements, CKD, HTN, and DORA presents for the evaluation of low back pain. Patient was last seen in the [facility] in 8/2017 where he received a bilateral L3, L4, and L5 RFA with great benefit. The patient presents today reporting of a recurrence of his low back pain that was well treated with last RFA. Patient localizes his pain to the center of his lower back without radiation. Patient describes his pain as an aching, burning, and throbbing type of pain. Patient reports that his current pain is a 7/10. Patient reports that his low back pain makes it difficult for him to ambulate. Patient denies of any urinary/fecal incontinence, saddle anesthesia, or weakness. Patient reports of requiring admission to the hospital for heat exhaustion but denies of any other significant interval changes in his health since his RFA.      Aggravating factors: worse in the AM, standing, and walking     Mitigating factors: RFA    INTERVAL HISTORY OF PRESENT ILLNESS: Richard Bustos is a 75 y.o. male with PMH significant for CAD s/p PCI (ASA and Plavix), atrial fibrllation on coumadin, hx of bilateral hip replacements, CKD, HTN, and DORA presents as an established patient for the continued management of back and left knee pain. The patient is s/p lumbar interlaminar epidural steroid injection at L5-S1 on 9/21/2020, and he reports of good relief. Today, the patient is reporting that his left knee pain is bothering him the most today. The patient denies of any significant changes in his health since his last appointment. The patient also denies of any changes in the character of his pain since his last appointment. Patient denies of any urinary/fecal incontinence, saddle anesthesia, or  "weakness. The patient reports of benefit with Norco 5-325 mg PO BID. The patient presents today requesting a left knee intra-articular knee injection.     INTERVENTIONAL PAIN HISTORY:  9/21/2020: Lumbar interlaminar epidural steroid injection at L5-S1  6/22/2020: L5-S1 lumbar interlaminar epidural steroid injection - at least 50% relief  2/17/2020: Left knee intra-articular knee injection - good relief  1/10/2020: Radiofrequency ablation of the L3, L4, L5 medial branch nerves on the bilateral-side - 50% relief  8/3/2017: RFA at L3, 4, 5 - reports 80% relief    CURRENT PAIN MEDICATIONS:   Norco 5-325 mg PO BID    Tried in the past:     Tramadol 50 mg PO BID for pain - mild benefit     ROS:  Review of Systems   Constitutional: Negative for chills and fever.   HENT: Negative for tinnitus.    Eyes: Negative for visual disturbance.   Respiratory: Negative for shortness of breath.    Cardiovascular: Negative for chest pain.   Gastrointestinal: Negative for nausea and vomiting.   Genitourinary: Negative for difficulty urinating.   Musculoskeletal: Positive for arthralgias and back pain.   Skin: Negative for rash.   Allergic/Immunologic: Negative for immunocompromised state.   Neurological: Negative for syncope.   Hematological: Does not bruise/bleed easily.   Psychiatric/Behavioral: Negative for suicidal ideas.        MEDICAL, SURGICAL, FAMILY, SOCIAL HX: reviewed    MEDICATIONS/ALLERGIES: reviewed    PHYSICAL EXAM:    VITALS: Vitals reviewed.   Vitals:    10/19/20 0923   BP: 137/77   Pulse: (!) 52   Weight: 127.2 kg (280 lb 6.8 oz)   Height: 5' 9" (1.753 m)   PainSc:   6   PainLoc: Knee       Physical Exam   Constitutional: He is oriented to person, place, and time and well-developed, well-nourished, and in no distress.   HENT:   Head: Normocephalic and atraumatic.   Eyes: Conjunctivae and EOM are normal. Right eye exhibits no discharge. Left eye exhibits no discharge.   Cardiovascular: Normal rate.   Pulmonary/Chest: " Effort normal and breath sounds normal. No respiratory distress.   Abdominal: Soft.   Neurological: He is alert and oriented to person, place, and time.   Skin: Skin is warm and dry. No rash noted. He is not diaphoretic.   Psychiatric: Mood, memory, affect and judgment normal.   Nursing note and vitals reviewed.       UPPER EXTREMITIES: Normal alignment, normal range of motion, no atrophy, no skin changes,  hair growth and nail growth normal and equal bilaterally. No swelling, no tenderness.    LOWER EXTREMITIES:  Normal alignment, normal range of motion, no atrophy, no skin changes,  hair growth and nail growth normal and equal bilaterally. No swelling, no tenderness.     LUMBAR SPINE  Lumbar spine: ROM is significantly limited with flexion extension and oblique extension with increased pain.    Supine straight leg raise: unable to perform secondary to fall risk   ((+)) Facet loading bilaterally  Internal and external rotation of the hips: unable to perform secondary to fall risk  Myofascial exam: No tenderness to palpation across lumbar paraspinous muscles.     MOTOR: Tone and bulk: normal bilateral upper and lower Strength: normal   Delt      Bi         Tri        WE      WF                        R          5          5          5          5          5          5            L          5          5          5          5          5          5               IP         ADD     ABD     Quad   TA        Gas      HAM  R          5          5          5          5          5          5          5  L          5          5          5          5          5          5          5     SENSATION: Light touch and pinprick intact bilaterally  REFLEXES: normal, symmetric, nonbrisk.  Toes down, no clonus. Negative roca's sign bilaterally.  GAIT: normal rise, base, steps, and arm swing.      IMAGING: no new imaging to review    ASSESSMENT: Richard Bustos is a 75 y.o. male with PMH significant for CAD s/p PCI (ASA and Plavix),  atrial fibrllation on coumadin, hx of bilateral hip replacements, CKD, HTN, and DORA presents as an established patient for the continued management of back and left knee pain. The patient is s/p lumbar interlaminar epidural steroid injection at L5-S1 on 9/21/2020, and he reports of good relief. Today, the patient is reporting that his left knee pain is bothering him the most today. Treatment plan outlined below.     PLAN:  1. Left knee intra-articular steroid injection performed in clinic today.   2. UDS to be collected today  3. Refilled Norco 5-325 mg PO BID PRN for breakthrough pain; #90 tablets; 0 refills.  reviewed without any discrepancies.   4. I have stressed the importance of physical activity and a home exercise plan to help with chronic pain and improve health.  5. RTC in 1 month for medication refill.     Evangelist Bose MD  Pain Management

## 2020-10-19 NOTE — PROCEDURES
Large Joint Aspiration/Injection: L knee    Date/Time: 10/19/2020 9:30 AM  Performed by: Evangelist Bose MD  Authorized by: Evangelist Bose MD     Consent Done?:  Yes (Written)  Indications:  Pain  Timeout: prior to procedure the correct patient, procedure, and site was verified    Prep: patient was prepped and draped in usual sterile fashion      Local anesthesia used?: Yes (cold spray used for topicalization)    Local anesthetic:  Bupivacaine 0.25% without epinephrine    Details:  Needle Size:  25 G  Ultrasonic Guidance for needle placement?: No    Approach:  Anterolateral  Location:  Knee  Site:  L knee  Medications:  40 mg methylPREDNISolone acetate 40 mg/mL  Patient tolerance:  Patient tolerated the procedure well with no immediate complications     40 mg of depo-medrol with 4 mL of 0.25% bupivacaine

## 2020-10-23 LAB
6MAM UR QL: NOT DETECTED
7AMINOCLONAZEPAM UR QL: NOT DETECTED
A-OH ALPRAZ UR QL: NOT DETECTED
ALPRAZ UR QL: NOT DETECTED
AMPHET UR QL SCN: NOT DETECTED
ANNOTATION COMMENT IMP: NORMAL
ANNOTATION COMMENT IMP: NORMAL
BARBITURATES UR QL: NOT DETECTED
BUPRENORPHINE UR QL: NOT DETECTED
BZE UR QL: NOT DETECTED
CARBOXYTHC UR QL: NOT DETECTED
CARISOPRODOL UR QL: NOT DETECTED
CLONAZEPAM UR QL: NOT DETECTED
CODEINE UR QL: NOT DETECTED
CREAT UR-MCNC: 55.3 MG/DL (ref 20–400)
DIAZEPAM UR QL: NOT DETECTED
ETHYL GLUCURONIDE UR QL: NOT DETECTED
FENTANYL UR QL: NOT DETECTED
HYDROCODONE UR QL: PRESENT
HYDROMORPHONE UR QL: NOT DETECTED
LORAZEPAM UR QL: NOT DETECTED
MDA UR QL: NOT DETECTED
MDEA UR QL: NOT DETECTED
MDMA UR QL: NOT DETECTED
ME-PHENIDATE UR QL: NOT DETECTED
MEPERIDINE UR QL: NOT DETECTED
METHADONE UR QL: NOT DETECTED
METHAMPHET UR QL: NOT DETECTED
MIDAZOLAM UR QL SCN: NOT DETECTED
MORPHINE UR QL: NOT DETECTED
NORBUPRENORPHINE UR QL CFM: NOT DETECTED
NORDIAZEPAM UR QL: NOT DETECTED
NORFENTANYL UR QL: NOT DETECTED
NORHYDROCODONE UR QL CFM: PRESENT
NOROXYCODONE UR QL CFM: NOT DETECTED
NOROXYMORPHONE: NOT DETECTED
OXAZEPAM UR QL: NOT DETECTED
OXYCODONE UR QL: NOT DETECTED
OXYMORPHONE UR QL: NOT DETECTED
PATHOLOGY STUDY: NORMAL
PCP UR QL: NOT DETECTED
PHENTERMINE UR QL: NOT DETECTED
PROPOXYPH UR QL: NOT DETECTED
SERVICE CMNT-IMP: NORMAL
TAPENTADOL UR QL SCN: NOT DETECTED
TAPENTADOL-O-SULF: NOT DETECTED
TEMAZEPAM UR QL: NOT DETECTED
TRAMADOL UR QL: NOT DETECTED
ZOLPIDEM UR QL: NOT DETECTED

## 2020-11-15 ENCOUNTER — PATIENT OUTREACH (OUTPATIENT)
Dept: ADMINISTRATIVE | Facility: OTHER | Age: 75
End: 2020-11-15

## 2020-11-15 NOTE — PROGRESS NOTES
Chart was reviewed for overdue [facility] ([facility])  topics  Updates were requested from care everywhere  Health Maintenance was unable to be updated  [facility] immunization registry triggered

## 2020-11-16 ENCOUNTER — OFFICE VISIT (OUTPATIENT)
Dept: PAIN MEDICINE | Facility: CLINIC | Age: 75
End: 2020-11-16
Payer: MEDICARE

## 2020-11-16 ENCOUNTER — LAB VISIT (OUTPATIENT)
Dept: LAB | Facility: HOSPITAL | Age: 75
End: 2020-11-16
Attending: PHYSICIAN ASSISTANT
Payer: MEDICARE

## 2020-11-16 ENCOUNTER — OFFICE VISIT (OUTPATIENT)
Dept: FAMILY MEDICINE | Facility: CLINIC | Age: 75
End: 2020-11-16
Payer: MEDICARE

## 2020-11-16 VITALS
WEIGHT: 280.44 LBS | HEART RATE: 57 BPM | SYSTOLIC BLOOD PRESSURE: 133 MMHG | DIASTOLIC BLOOD PRESSURE: 70 MMHG | BODY MASS INDEX: 41.54 KG/M2 | HEIGHT: 69 IN

## 2020-11-16 VITALS
HEART RATE: 60 BPM | HEIGHT: 69 IN | OXYGEN SATURATION: 95 % | SYSTOLIC BLOOD PRESSURE: 130 MMHG | TEMPERATURE: 97 F | WEIGHT: 262.38 LBS | DIASTOLIC BLOOD PRESSURE: 78 MMHG | BODY MASS INDEX: 38.86 KG/M2

## 2020-11-16 DIAGNOSIS — R19.03 RIGHT LOWER QUADRANT ABDOMINAL MASS: ICD-10-CM

## 2020-11-16 DIAGNOSIS — G89.29 CHRONIC HIP PAIN AFTER TOTAL REPLACEMENT OF RIGHT HIP JOINT: ICD-10-CM

## 2020-11-16 DIAGNOSIS — M48.00 CENTRAL STENOSIS OF SPINAL CANAL: Primary | ICD-10-CM

## 2020-11-16 DIAGNOSIS — M47.896 OTHER SPONDYLOSIS, LUMBAR REGION: ICD-10-CM

## 2020-11-16 DIAGNOSIS — R19.7 DIARRHEA, UNSPECIFIED TYPE: ICD-10-CM

## 2020-11-16 DIAGNOSIS — K62.5 RECTAL BLEEDING: ICD-10-CM

## 2020-11-16 DIAGNOSIS — R19.7 DIARRHEA, UNSPECIFIED TYPE: Primary | ICD-10-CM

## 2020-11-16 DIAGNOSIS — Z96.641 CHRONIC HIP PAIN AFTER TOTAL REPLACEMENT OF RIGHT HIP JOINT: ICD-10-CM

## 2020-11-16 DIAGNOSIS — M51.36 DDD (DEGENERATIVE DISC DISEASE), LUMBAR: ICD-10-CM

## 2020-11-16 DIAGNOSIS — M25.551 CHRONIC HIP PAIN AFTER TOTAL REPLACEMENT OF RIGHT HIP JOINT: ICD-10-CM

## 2020-11-16 LAB
ALBUMIN SERPL BCP-MCNC: 3 G/DL (ref 3.5–5.2)
ALP SERPL-CCNC: 88 U/L (ref 55–135)
ALT SERPL W/O P-5'-P-CCNC: 19 U/L (ref 10–44)
ANION GAP SERPL CALC-SCNC: 7 MMOL/L (ref 8–16)
AST SERPL-CCNC: 23 U/L (ref 10–40)
BASOPHILS # BLD AUTO: 0.03 K/UL (ref 0–0.2)
BASOPHILS NFR BLD: 0.5 % (ref 0–1.9)
BILIRUB SERPL-MCNC: 0.4 MG/DL (ref 0.1–1)
BUN SERPL-MCNC: 38 MG/DL (ref 8–23)
CALCIUM SERPL-MCNC: 9 MG/DL (ref 8.7–10.5)
CHLORIDE SERPL-SCNC: 114 MMOL/L (ref 95–110)
CO2 SERPL-SCNC: 20 MMOL/L (ref 23–29)
CREAT SERPL-MCNC: 1.8 MG/DL (ref 0.5–1.4)
DIFFERENTIAL METHOD: ABNORMAL
EOSINOPHIL # BLD AUTO: 0.2 K/UL (ref 0–0.5)
EOSINOPHIL NFR BLD: 2.8 % (ref 0–8)
ERYTHROCYTE [DISTWIDTH] IN BLOOD BY AUTOMATED COUNT: 14.2 % (ref 11.5–14.5)
EST. GFR  (AFRICAN AMERICAN): 42.2 ML/MIN/1.73 M^2
EST. GFR  (NON AFRICAN AMERICAN): 36.5 ML/MIN/1.73 M^2
GLUCOSE SERPL-MCNC: 165 MG/DL (ref 70–110)
HCT VFR BLD AUTO: 31.1 % (ref 40–54)
HGB BLD-MCNC: 10 G/DL (ref 14–18)
IMM GRANULOCYTES # BLD AUTO: 0.03 K/UL (ref 0–0.04)
IMM GRANULOCYTES NFR BLD AUTO: 0.5 % (ref 0–0.5)
LYMPHOCYTES # BLD AUTO: 1.2 K/UL (ref 1–4.8)
LYMPHOCYTES NFR BLD: 20.6 % (ref 18–48)
MCH RBC QN AUTO: 33.8 PG (ref 27–31)
MCHC RBC AUTO-ENTMCNC: 32.2 G/DL (ref 32–36)
MCV RBC AUTO: 105 FL (ref 82–98)
MONOCYTES # BLD AUTO: 0.5 K/UL (ref 0.3–1)
MONOCYTES NFR BLD: 7.5 % (ref 4–15)
NEUTROPHILS # BLD AUTO: 4.1 K/UL (ref 1.8–7.7)
NEUTROPHILS NFR BLD: 68.1 % (ref 38–73)
NRBC BLD-RTO: 0 /100 WBC
PLATELET # BLD AUTO: 203 K/UL (ref 150–350)
PMV BLD AUTO: 11 FL (ref 9.2–12.9)
POTASSIUM SERPL-SCNC: 5.3 MMOL/L (ref 3.5–5.1)
PROT SERPL-MCNC: 6.3 G/DL (ref 6–8.4)
RBC # BLD AUTO: 2.96 M/UL (ref 4.6–6.2)
SODIUM SERPL-SCNC: 141 MMOL/L (ref 136–145)
WBC # BLD AUTO: 6.02 K/UL (ref 3.9–12.7)

## 2020-11-16 PROCEDURE — 3078F DIAST BP <80 MM HG: CPT | Mod: CPTII,S$GLB,, | Performed by: ANESTHESIOLOGY

## 2020-11-16 PROCEDURE — 1101F PR PT FALLS ASSESS DOC 0-1 FALLS W/OUT INJ PAST YR: ICD-10-PCS | Mod: CPTII,S$GLB,, | Performed by: ANESTHESIOLOGY

## 2020-11-16 PROCEDURE — 80053 COMPREHEN METABOLIC PANEL: CPT

## 2020-11-16 PROCEDURE — 99999 PR PBB SHADOW E&M-EST. PATIENT-LVL V: CPT | Mod: PBBFAC,,, | Performed by: PHYSICIAN ASSISTANT

## 2020-11-16 PROCEDURE — 3008F BODY MASS INDEX DOCD: CPT | Mod: CPTII,S$GLB,, | Performed by: ANESTHESIOLOGY

## 2020-11-16 PROCEDURE — 99999 PR PBB SHADOW E&M-EST. PATIENT-LVL V: ICD-10-PCS | Mod: PBBFAC,,, | Performed by: ANESTHESIOLOGY

## 2020-11-16 PROCEDURE — 3078F PR MOST RECENT DIASTOLIC BLOOD PRESSURE < 80 MM HG: ICD-10-PCS | Mod: CPTII,S$GLB,, | Performed by: ANESTHESIOLOGY

## 2020-11-16 PROCEDURE — 3288F PR FALLS RISK ASSESSMENT DOCUMENTED: ICD-10-PCS | Mod: CPTII,S$GLB,, | Performed by: ANESTHESIOLOGY

## 2020-11-16 PROCEDURE — 1159F PR MEDICATION LIST DOCUMENTED IN MEDICAL RECORD: ICD-10-PCS | Mod: S$GLB,,, | Performed by: ANESTHESIOLOGY

## 2020-11-16 PROCEDURE — 1125F PR PAIN SEVERITY QUANTIFIED, PAIN PRESENT: ICD-10-PCS | Mod: S$GLB,,, | Performed by: PHYSICIAN ASSISTANT

## 2020-11-16 PROCEDURE — 3075F PR MOST RECENT SYSTOLIC BLOOD PRESS GE 130-139MM HG: ICD-10-PCS | Mod: CPTII,S$GLB,, | Performed by: PHYSICIAN ASSISTANT

## 2020-11-16 PROCEDURE — 3008F PR BODY MASS INDEX (BMI) DOCUMENTED: ICD-10-PCS | Mod: CPTII,S$GLB,, | Performed by: ANESTHESIOLOGY

## 2020-11-16 PROCEDURE — 99999 PR PBB SHADOW E&M-EST. PATIENT-LVL V: CPT | Mod: PBBFAC,,, | Performed by: ANESTHESIOLOGY

## 2020-11-16 PROCEDURE — 99214 OFFICE O/P EST MOD 30 MIN: CPT | Mod: S$GLB,,, | Performed by: PHYSICIAN ASSISTANT

## 2020-11-16 PROCEDURE — 3008F BODY MASS INDEX DOCD: CPT | Mod: CPTII,S$GLB,, | Performed by: PHYSICIAN ASSISTANT

## 2020-11-16 PROCEDURE — 3075F PR MOST RECENT SYSTOLIC BLOOD PRESS GE 130-139MM HG: ICD-10-PCS | Mod: CPTII,S$GLB,, | Performed by: ANESTHESIOLOGY

## 2020-11-16 PROCEDURE — 3075F SYST BP GE 130 - 139MM HG: CPT | Mod: CPTII,S$GLB,, | Performed by: PHYSICIAN ASSISTANT

## 2020-11-16 PROCEDURE — 1101F PT FALLS ASSESS-DOCD LE1/YR: CPT | Mod: CPTII,S$GLB,, | Performed by: ANESTHESIOLOGY

## 2020-11-16 PROCEDURE — 1159F MED LIST DOCD IN RCRD: CPT | Mod: S$GLB,,, | Performed by: PHYSICIAN ASSISTANT

## 2020-11-16 PROCEDURE — 1125F PR PAIN SEVERITY QUANTIFIED, PAIN PRESENT: ICD-10-PCS | Mod: S$GLB,,, | Performed by: ANESTHESIOLOGY

## 2020-11-16 PROCEDURE — 99999 PR PBB SHADOW E&M-EST. PATIENT-LVL V: ICD-10-PCS | Mod: PBBFAC,,, | Performed by: PHYSICIAN ASSISTANT

## 2020-11-16 PROCEDURE — 3008F PR BODY MASS INDEX (BMI) DOCUMENTED: ICD-10-PCS | Mod: CPTII,S$GLB,, | Performed by: PHYSICIAN ASSISTANT

## 2020-11-16 PROCEDURE — 3078F PR MOST RECENT DIASTOLIC BLOOD PRESSURE < 80 MM HG: ICD-10-PCS | Mod: CPTII,S$GLB,, | Performed by: PHYSICIAN ASSISTANT

## 2020-11-16 PROCEDURE — 1159F PR MEDICATION LIST DOCUMENTED IN MEDICAL RECORD: ICD-10-PCS | Mod: S$GLB,,, | Performed by: PHYSICIAN ASSISTANT

## 2020-11-16 PROCEDURE — 85025 COMPLETE CBC W/AUTO DIFF WBC: CPT

## 2020-11-16 PROCEDURE — 3288F FALL RISK ASSESSMENT DOCD: CPT | Mod: CPTII,S$GLB,, | Performed by: ANESTHESIOLOGY

## 2020-11-16 PROCEDURE — 3078F DIAST BP <80 MM HG: CPT | Mod: CPTII,S$GLB,, | Performed by: PHYSICIAN ASSISTANT

## 2020-11-16 PROCEDURE — 99214 OFFICE O/P EST MOD 30 MIN: CPT | Mod: S$GLB,,, | Performed by: ANESTHESIOLOGY

## 2020-11-16 PROCEDURE — 36415 COLL VENOUS BLD VENIPUNCTURE: CPT | Mod: PO

## 2020-11-16 PROCEDURE — 99214 PR OFFICE/OUTPT VISIT, EST, LEVL IV, 30-39 MIN: ICD-10-PCS | Mod: S$GLB,,, | Performed by: ANESTHESIOLOGY

## 2020-11-16 PROCEDURE — 1125F AMNT PAIN NOTED PAIN PRSNT: CPT | Mod: S$GLB,,, | Performed by: PHYSICIAN ASSISTANT

## 2020-11-16 PROCEDURE — 1159F MED LIST DOCD IN RCRD: CPT | Mod: S$GLB,,, | Performed by: ANESTHESIOLOGY

## 2020-11-16 PROCEDURE — 99214 PR OFFICE/OUTPT VISIT, EST, LEVL IV, 30-39 MIN: ICD-10-PCS | Mod: S$GLB,,, | Performed by: PHYSICIAN ASSISTANT

## 2020-11-16 PROCEDURE — 1125F AMNT PAIN NOTED PAIN PRSNT: CPT | Mod: S$GLB,,, | Performed by: ANESTHESIOLOGY

## 2020-11-16 PROCEDURE — 3075F SYST BP GE 130 - 139MM HG: CPT | Mod: CPTII,S$GLB,, | Performed by: ANESTHESIOLOGY

## 2020-11-16 RX ORDER — HYDROCODONE BITARTRATE AND ACETAMINOPHEN 5; 325 MG/1; MG/1
1 TABLET ORAL EVERY 8 HOURS PRN
Qty: 90 TABLET | Refills: 0 | Status: ON HOLD | OUTPATIENT
Start: 2020-11-16 | End: 2020-11-17 | Stop reason: CLARIF

## 2020-11-16 RX ORDER — HYDROCODONE BITARTRATE AND ACETAMINOPHEN 5; 325 MG/1; MG/1
1 TABLET ORAL EVERY 8 HOURS PRN
Qty: 90 TABLET | Refills: 0 | Status: ON HOLD | OUTPATIENT
Start: 2020-12-14 | End: 2020-11-17 | Stop reason: CLARIF

## 2020-11-16 RX ORDER — HYDROCODONE BITARTRATE AND ACETAMINOPHEN 5; 325 MG/1; MG/1
1 TABLET ORAL EVERY 8 HOURS PRN
Qty: 90 TABLET | Refills: 0 | Status: ON HOLD | OUTPATIENT
Start: 2021-01-11 | End: 2020-11-17 | Stop reason: CLARIF

## 2020-11-16 NOTE — PROGRESS NOTES
FOLLOW UP NOTE:     CHIEF COMPLAINT: right hip pain    INITIAL HISTORY OF PRESENT ILLNESS: Richard Bustos is a 74 y.o. male with PMH significant for CAD s/p PCI (ASA and Plavix), atrial fibrllation on coumadin, hx of bilateral hip replacements, CKD, HTN, and DORA presents for the evaluation of low back pain. Patient was last seen in the [facility] in 8/2017 where he received a bilateral L3, L4, and L5 RFA with great benefit. The patient presents today reporting of a recurrence of his low back pain that was well treated with last RFA. Patient localizes his pain to the center of his lower back without radiation. Patient describes his pain as an aching, burning, and throbbing type of pain. Patient reports that his current pain is a 7/10. Patient reports that his low back pain makes it difficult for him to ambulate. Patient denies of any urinary/fecal incontinence, saddle anesthesia, or weakness. Patient reports of requiring admission to the hospital for heat exhaustion but denies of any other significant interval changes in his health since his RFA.      Aggravating factors: worse in the AM, standing, and walking     Mitigating factors: RFA    INTERVAL HISTORY OF PRESENT ILLNESS: Richard Bustos is a 75 y.o. male with PMH significant for CAD s/p PCI (ASA and Plavix), atrial fibrllation on coumadin, hx of bilateral hip replacements, CKD, HTN, and DORA presents as an established patient for the continued management of back and right hip pain. Today, the patient is not complaining primarily of back pain. The patient is reporting of right hip pain with associated groin pain today. The patient reports that this began approximately 1 month ago. The patient reports that he has a prior history of a hematoma that he is concerned about given his relatively new onset right hip pain. The patient denies of any significant changes in his health since his last appointment. The patient also denies of any changes in the  "character of his pain since his last appointment. The patient reports of benefit on his current pain regimen. Patient denies of any urinary/fecal incontinence, saddle anesthesia, or weakness.     INTERVENTIONAL PAIN HISTORY:  10/19/2020: Left knee intra-articular steroid injection   9/21/2020: Lumbar interlaminar epidural steroid injection at L5-S1  6/22/2020: L5-S1 lumbar interlaminar epidural steroid injection - at least 50% relief  2/17/2020: Left knee intra-articular knee injection - good relief  1/10/2020: Radiofrequency ablation of the L3, L4, L5 medial branch nerves on the bilateral-side - 50% relief  8/3/2017: RFA at L3, 4, 5 - reports 80% relief    CURRENT PAIN MEDICATIONS:   Norco 5-325 mg PO BID/TID     Tried in the past:   Tramadol 50 mg PO BID for pain - mild benefit          ROS:  Review of Systems   Constitutional: Negative for chills and fever.   HENT: Negative for tinnitus.    Eyes: Negative for visual disturbance.   Respiratory: Negative for shortness of breath.    Cardiovascular: Negative for chest pain.   Gastrointestinal: Negative for nausea and vomiting.   Genitourinary: Negative for difficulty urinating.   Musculoskeletal: Positive for arthralgias and back pain.   Skin: Negative for rash.   Allergic/Immunologic: Negative for immunocompromised state.   Neurological: Negative for syncope.   Hematological: Does not bruise/bleed easily.   Psychiatric/Behavioral: Negative for suicidal ideas.        MEDICAL, SURGICAL, FAMILY, SOCIAL HX: reviewed    MEDICATIONS/ALLERGIES: reviewed    PHYSICAL EXAM:    VITALS: Vitals reviewed.   Vitals:    11/16/20 1050   BP: 133/70   Pulse: (!) 57   Weight: 127.2 kg (280 lb 6.8 oz)   Height: 5' 9" (1.753 m)   PainSc:   7   PainLoc: Generalized       Physical Exam   Constitutional: He is oriented to person, place, and time and well-developed, well-nourished, and in no distress.   HENT:   Head: Normocephalic and atraumatic.   Eyes: Conjunctivae and EOM are normal. " Right eye exhibits no discharge. Left eye exhibits no discharge.   Cardiovascular: Normal rate.   Pulmonary/Chest: Effort normal and breath sounds normal. No respiratory distress.   Abdominal: Soft.   Neurological: He is alert and oriented to person, place, and time.   Skin: Skin is warm and dry. No rash noted. He is not diaphoretic.   Psychiatric: Mood, memory, affect and judgment normal.   Nursing note and vitals reviewed.       UPPER EXTREMITIES: Normal alignment, normal range of motion, no atrophy, no skin changes,  hair growth and nail growth normal and equal bilaterally. No swelling, no tenderness.    LOWER EXTREMITIES:  Normal alignment, normal range of motion, no atrophy, no skin changes,  hair growth and nail growth normal and equal bilaterally. No swelling, no tenderness.     LUMBAR SPINE  Lumbar spine: ROM is significantly limited with flexion extension and oblique extension with increased pain.    Supine straight leg raise: unable to perform secondary to fall risk   ((+)) Facet loading bilaterally  Internal and external rotation of the hips: unable to perform secondary to fall risk  Myofascial exam: No tenderness to palpation across lumbar paraspinous muscles.     MOTOR: Tone and bulk: normal bilateral upper and lower Strength: normal   Delt      Bi         Tri        WE      WF                        R          5          5          5          5          5          5            L          5          5          5          5          5          5               IP         ADD     ABD     Quad   TA        Gas      HAM  R          5          5          5          5          5          5          5  L          5          5          5          5          5          5          5     SENSATION: Light touch and pinprick intact bilaterally  REFLEXES: normal, symmetric, nonbrisk.  Toes down, no clonus. Negative roca's sign bilaterally.  GAIT: normal rise, base, steps, and arm swing.      IMAGING: no new  imaging to review    ASSESSMENT: Richard Bustos is a 75 y.o. male with PMH significant for CAD s/p PCI (ASA and Plavix), atrial fibrllation on coumadin, hx of bilateral hip replacements, CKD, HTN, and DORA presents as an established patient for the continued management of back and right hip pain. The patient is reporting of right hip pain with associated groin pain today. No recent imaging of the right hip to review today. Treatment plan outlined below.     PLAN:  1. Ordered right hip plain film for further evaluation of new onset right hip pain in the background of prior hip replacement  2. Refilled Norco 5-325 mg PO TID for breakthrough pain; #90 tablets; 2 refills provided.  reviewed without discrepancies.   3. Most recent UDS was consistent  4. I have stressed the importance of physical activity and a home exercise plan to help with chronic pain and improve health.  5. Can repeat RFA in the future if his back pain becomes to bothersome.   6. RTC in 1 month for follow-up    Evangelist Bose MD  Pain Management

## 2020-11-16 NOTE — PROGRESS NOTES
Subjective:       Patient ID: Richard Bustos is a 75 y.o. male.    Chief Complaint: Diarrhea    Patient presents for evaluation of bloody diarrhea X 3-4 days.  Took imodium with some improvement.  States he has a hemorrhoid that is bleeding bright red blood but the stool/diarrhea was also mixed with dark/orange/rust colored blood.  He states that he had a very large hematoma in the right abdomen 4-5 years ago.  He is concerned that this hematoma in bleeding in to the intestines.  He discussed that this is unlikely.   Patients patient medical/surgical, social and family histories have been reviewed      Review of Systems   Constitutional: Negative for activity change, appetite change, fatigue and unexpected weight change.   Respiratory: Negative for cough, chest tightness and shortness of breath.    Cardiovascular: Negative for chest pain, palpitations and leg swelling.   Gastrointestinal: Positive for anal bleeding, blood in stool and diarrhea. Negative for abdominal distention, abdominal pain, constipation, nausea and vomiting.   Endocrine: Negative for polydipsia and polyuria.   Genitourinary: Negative for difficulty urinating, frequency, hematuria and urgency.   Musculoskeletal: Negative for arthralgias.   Skin: Negative for rash.   Neurological: Negative for dizziness and headaches.   Psychiatric/Behavioral: Negative for dysphoric mood. The patient is not nervous/anxious.        Objective:      Physical Exam  Vitals signs reviewed.   Constitutional:       General: He is not in acute distress.     Appearance: Normal appearance. He is well-developed.   Cardiovascular:      Rate and Rhythm: Normal rate and regular rhythm.      Heart sounds: Normal heart sounds.   Pulmonary:      Effort: Pulmonary effort is normal.      Breath sounds: Normal breath sounds.   Abdominal:      General: Bowel sounds are normal.      Palpations: Abdomen is soft. Abdomen is not rigid. There is mass (RLQ palpabed firm smooth round nodule 5  cm ).      Tenderness: There is no abdominal tenderness. There is no guarding or rebound. Negative signs include Riggs's sign and McBurney's sign.      Comments: Abdominal exam limited due to body habitus and his inability to get onto the exam table          Assessment:       1. Diarrhea, unspecified type    2. Rectal bleeding    3. Right lower quadrant abdominal mass        Plan:       Richard was seen today for diarrhea.    Diagnoses and all orders for this visit:    Diarrhea, unspecified type  -     Stool culture; Future  -     Clostridium difficile EIA; Future  -     WBC, Stool; Future  -     Stool Exam-Ova,Cysts,Parasites; Future  -     Comprehensive Metabolic Panel; Future  -     CBC Auto Differential; Future  -     Ambulatory referral/consult to Gastroenterology; Future    Rectal bleeding  -     Ambulatory referral/consult to Gastroenterology; Future            Right lower quadrant abdominal mass  -     US Abdomen Limited; Future    Further recommendations will be made based on results   Seek ER for reoccurrence of bleeding, lightheadedness, sob, dizzy, chest pain

## 2020-11-16 NOTE — PATIENT INSTRUCTIONS
We will Ultrasound the area of the hematoma again. Do not feel that this is related to any bleeding from rectum.   Stool studies to check for infectious cause of diarrhea   GI follow up for the bleeding

## 2020-11-17 ENCOUNTER — HOSPITAL ENCOUNTER (INPATIENT)
Facility: HOSPITAL | Age: 75
LOS: 1 days | Discharge: HOME | DRG: 379 | End: 2020-11-18
Attending: EMERGENCY MEDICINE | Admitting: HOSPITALIST
Payer: MEDICARE

## 2020-11-17 DIAGNOSIS — E87.5 HYPERKALEMIA: ICD-10-CM

## 2020-11-17 DIAGNOSIS — M25.551 RIGHT HIP PAIN: Primary | ICD-10-CM

## 2020-11-17 DIAGNOSIS — K62.5 RECTAL BLEEDING: Primary | ICD-10-CM

## 2020-11-17 PROBLEM — D63.1 ANEMIA DUE TO STAGE 3 CHRONIC KIDNEY DISEASE: Status: ACTIVE | Noted: 2017-09-13

## 2020-11-17 PROBLEM — N18.30 ANEMIA DUE TO STAGE 3 CHRONIC KIDNEY DISEASE: Status: ACTIVE | Noted: 2017-09-13

## 2020-11-17 PROBLEM — Z79.01 WARFARIN ANTICOAGULATION: Status: ACTIVE | Noted: 2020-11-17

## 2020-11-17 PROBLEM — K92.2 GI BLEED: Status: ACTIVE | Noted: 2020-11-17

## 2020-11-17 LAB
ALBUMIN SERPL BCP-MCNC: 2.9 G/DL (ref 3.5–5.2)
ALP SERPL-CCNC: 86 U/L (ref 55–135)
ALT SERPL W/O P-5'-P-CCNC: 18 U/L (ref 10–44)
ANION GAP SERPL CALC-SCNC: 6 MMOL/L (ref 8–16)
APTT BLDCRRT: 43 SEC (ref 21–32)
AST SERPL-CCNC: 22 U/L (ref 10–40)
BASOPHILS # BLD AUTO: 0.02 K/UL (ref 0–0.2)
BASOPHILS # BLD AUTO: 0.02 K/UL (ref 0–0.2)
BASOPHILS # BLD AUTO: 0.04 K/UL (ref 0–0.2)
BASOPHILS NFR BLD: 0.3 % (ref 0–1.9)
BASOPHILS NFR BLD: 0.3 % (ref 0–1.9)
BASOPHILS NFR BLD: 0.6 % (ref 0–1.9)
BILIRUB SERPL-MCNC: 0.4 MG/DL (ref 0.1–1)
BUN SERPL-MCNC: 36 MG/DL (ref 8–23)
CALCIUM SERPL-MCNC: 9.1 MG/DL (ref 8.7–10.5)
CHLORIDE SERPL-SCNC: 116 MMOL/L (ref 95–110)
CO2 SERPL-SCNC: 22 MMOL/L (ref 23–29)
CREAT SERPL-MCNC: 1.7 MG/DL (ref 0.5–1.4)
DIFFERENTIAL METHOD: ABNORMAL
EOSINOPHIL # BLD AUTO: 0.2 K/UL (ref 0–0.5)
EOSINOPHIL NFR BLD: 2.4 % (ref 0–8)
EOSINOPHIL NFR BLD: 2.6 % (ref 0–8)
EOSINOPHIL NFR BLD: 2.7 % (ref 0–8)
ERYTHROCYTE [DISTWIDTH] IN BLOOD BY AUTOMATED COUNT: 13.8 % (ref 11.5–14.5)
ERYTHROCYTE [DISTWIDTH] IN BLOOD BY AUTOMATED COUNT: 13.9 % (ref 11.5–14.5)
ERYTHROCYTE [DISTWIDTH] IN BLOOD BY AUTOMATED COUNT: 14.1 % (ref 11.5–14.5)
EST. GFR  (AFRICAN AMERICAN): 45 ML/MIN/1.73 M^2
EST. GFR  (NON AFRICAN AMERICAN): 39 ML/MIN/1.73 M^2
GLUCOSE SERPL-MCNC: 121 MG/DL (ref 70–110)
HCT VFR BLD AUTO: 29.6 % (ref 40–54)
HCT VFR BLD AUTO: 30.5 % (ref 40–54)
HCT VFR BLD AUTO: 30.7 % (ref 40–54)
HGB BLD-MCNC: 9.5 G/DL (ref 14–18)
HGB BLD-MCNC: 9.8 G/DL (ref 14–18)
HGB BLD-MCNC: 9.9 G/DL (ref 14–18)
IMM GRANULOCYTES # BLD AUTO: 0.05 K/UL (ref 0–0.04)
IMM GRANULOCYTES # BLD AUTO: 0.06 K/UL (ref 0–0.04)
IMM GRANULOCYTES # BLD AUTO: 0.07 K/UL (ref 0–0.04)
IMM GRANULOCYTES NFR BLD AUTO: 0.7 % (ref 0–0.5)
IMM GRANULOCYTES NFR BLD AUTO: 0.9 % (ref 0–0.5)
IMM GRANULOCYTES NFR BLD AUTO: 1.1 % (ref 0–0.5)
INR PPP: 3.3 (ref 0.8–1.2)
LYMPHOCYTES # BLD AUTO: 1.4 K/UL (ref 1–4.8)
LYMPHOCYTES # BLD AUTO: 1.4 K/UL (ref 1–4.8)
LYMPHOCYTES # BLD AUTO: 1.7 K/UL (ref 1–4.8)
LYMPHOCYTES NFR BLD: 21.3 % (ref 18–48)
LYMPHOCYTES NFR BLD: 21.9 % (ref 18–48)
LYMPHOCYTES NFR BLD: 24.3 % (ref 18–48)
MCH RBC QN AUTO: 33.5 PG (ref 27–31)
MCH RBC QN AUTO: 33.6 PG (ref 27–31)
MCH RBC QN AUTO: 33.8 PG (ref 27–31)
MCHC RBC AUTO-ENTMCNC: 32.1 G/DL (ref 32–36)
MCHC RBC AUTO-ENTMCNC: 32.1 G/DL (ref 32–36)
MCHC RBC AUTO-ENTMCNC: 32.2 G/DL (ref 32–36)
MCV RBC AUTO: 104 FL (ref 82–98)
MCV RBC AUTO: 105 FL (ref 82–98)
MCV RBC AUTO: 105 FL (ref 82–98)
MONOCYTES # BLD AUTO: 0.4 K/UL (ref 0.3–1)
MONOCYTES # BLD AUTO: 0.5 K/UL (ref 0.3–1)
MONOCYTES # BLD AUTO: 0.5 K/UL (ref 0.3–1)
MONOCYTES NFR BLD: 6.3 % (ref 4–15)
MONOCYTES NFR BLD: 7.4 % (ref 4–15)
MONOCYTES NFR BLD: 7.7 % (ref 4–15)
NEUTROPHILS # BLD AUTO: 4.2 K/UL (ref 1.8–7.7)
NEUTROPHILS # BLD AUTO: 4.5 K/UL (ref 1.8–7.7)
NEUTROPHILS # BLD AUTO: 4.5 K/UL (ref 1.8–7.7)
NEUTROPHILS NFR BLD: 64.4 % (ref 38–73)
NEUTROPHILS NFR BLD: 67.4 % (ref 38–73)
NEUTROPHILS NFR BLD: 67.7 % (ref 38–73)
NRBC BLD-RTO: 0 /100 WBC
PLATELET # BLD AUTO: 195 K/UL (ref 150–350)
PLATELET # BLD AUTO: 199 K/UL (ref 150–350)
PLATELET # BLD AUTO: 206 K/UL (ref 150–350)
PMV BLD AUTO: 10.6 FL (ref 9.2–12.9)
PMV BLD AUTO: 10.6 FL (ref 9.2–12.9)
PMV BLD AUTO: 10.7 FL (ref 9.2–12.9)
POCT GLUCOSE: 123 MG/DL (ref 70–110)
POCT GLUCOSE: 143 MG/DL (ref 70–110)
POTASSIUM SERPL-SCNC: 5 MMOL/L (ref 3.5–5.1)
POTASSIUM SERPL-SCNC: 6 MMOL/L (ref 3.5–5.1)
PROT SERPL-MCNC: 6.3 G/DL (ref 6–8.4)
PROTHROMBIN TIME: 34.3 SEC (ref 9–12.5)
RBC # BLD AUTO: 2.84 M/UL (ref 4.6–6.2)
RBC # BLD AUTO: 2.92 M/UL (ref 4.6–6.2)
RBC # BLD AUTO: 2.93 M/UL (ref 4.6–6.2)
SARS-COV-2 RDRP RESP QL NAA+PROBE: NEGATIVE
SODIUM SERPL-SCNC: 144 MMOL/L (ref 136–145)
WBC # BLD AUTO: 6.22 K/UL (ref 3.9–12.7)
WBC # BLD AUTO: 6.72 K/UL (ref 3.9–12.7)
WBC # BLD AUTO: 6.91 K/UL (ref 3.9–12.7)

## 2020-11-17 PROCEDURE — 93010 EKG 12-LEAD: ICD-10-PCS | Mod: ,,, | Performed by: SPECIALIST

## 2020-11-17 PROCEDURE — 80053 COMPREHEN METABOLIC PANEL: CPT

## 2020-11-17 PROCEDURE — 63600175 PHARM REV CODE 636 W HCPCS: Performed by: HOSPITALIST

## 2020-11-17 PROCEDURE — 85025 COMPLETE CBC W/AUTO DIFF WBC: CPT | Mod: 91

## 2020-11-17 PROCEDURE — 93010 ELECTROCARDIOGRAM REPORT: CPT | Mod: ,,, | Performed by: SPECIALIST

## 2020-11-17 PROCEDURE — 83036 HEMOGLOBIN GLYCOSYLATED A1C: CPT

## 2020-11-17 PROCEDURE — 85730 THROMBOPLASTIN TIME PARTIAL: CPT

## 2020-11-17 PROCEDURE — C9113 INJ PANTOPRAZOLE SODIUM, VIA: HCPCS | Performed by: HOSPITALIST

## 2020-11-17 PROCEDURE — 25000003 PHARM REV CODE 250: Performed by: INTERNAL MEDICINE

## 2020-11-17 PROCEDURE — 25000003 PHARM REV CODE 250: Performed by: HOSPITALIST

## 2020-11-17 PROCEDURE — 11000001 HC ACUTE MED/SURG PRIVATE ROOM

## 2020-11-17 PROCEDURE — U0002 COVID-19 LAB TEST NON-CDC: HCPCS

## 2020-11-17 PROCEDURE — 93005 ELECTROCARDIOGRAM TRACING: CPT

## 2020-11-17 PROCEDURE — 84132 ASSAY OF SERUM POTASSIUM: CPT

## 2020-11-17 PROCEDURE — 99222 PR INITIAL HOSPITAL CARE,LEVL II: ICD-10-PCS | Mod: ,,, | Performed by: INTERNAL MEDICINE

## 2020-11-17 PROCEDURE — 99222 1ST HOSP IP/OBS MODERATE 55: CPT | Mod: ,,, | Performed by: INTERNAL MEDICINE

## 2020-11-17 PROCEDURE — 99291 CRITICAL CARE FIRST HOUR: CPT

## 2020-11-17 PROCEDURE — 25000003 PHARM REV CODE 250: Performed by: EMERGENCY MEDICINE

## 2020-11-17 PROCEDURE — 85610 PROTHROMBIN TIME: CPT

## 2020-11-17 PROCEDURE — 36415 COLL VENOUS BLD VENIPUNCTURE: CPT

## 2020-11-17 RX ORDER — CARVEDILOL 25 MG/1
25 TABLET ORAL 2 TIMES DAILY WITH MEALS
Status: DISCONTINUED | OUTPATIENT
Start: 2020-11-17 | End: 2020-11-18 | Stop reason: HOSPADM

## 2020-11-17 RX ORDER — SODIUM CHLORIDE 9 MG/ML
INJECTION, SOLUTION INTRAVENOUS CONTINUOUS
Status: DISCONTINUED | OUTPATIENT
Start: 2020-11-17 | End: 2020-11-18 | Stop reason: HOSPADM

## 2020-11-17 RX ORDER — ACETAMINOPHEN 500 MG
1000 TABLET ORAL
Status: COMPLETED | OUTPATIENT
Start: 2020-11-17 | End: 2020-11-17

## 2020-11-17 RX ORDER — INSULIN ASPART 100 [IU]/ML
0-5 INJECTION, SOLUTION INTRAVENOUS; SUBCUTANEOUS
Status: DISCONTINUED | OUTPATIENT
Start: 2020-11-17 | End: 2020-11-18 | Stop reason: HOSPADM

## 2020-11-17 RX ORDER — ONDANSETRON 4 MG/1
4 TABLET, ORALLY DISINTEGRATING ORAL ONCE
Status: DISCONTINUED | OUTPATIENT
Start: 2020-11-17 | End: 2020-11-18 | Stop reason: HOSPADM

## 2020-11-17 RX ORDER — HYDROCODONE BITARTRATE AND ACETAMINOPHEN 5; 325 MG/1; MG/1
1 TABLET ORAL EVERY 8 HOURS PRN
Status: DISCONTINUED | OUTPATIENT
Start: 2020-11-17 | End: 2020-11-18 | Stop reason: HOSPADM

## 2020-11-17 RX ORDER — PANTOPRAZOLE SODIUM 40 MG/10ML
40 INJECTION, POWDER, LYOPHILIZED, FOR SOLUTION INTRAVENOUS 2 TIMES DAILY
Status: DISCONTINUED | OUTPATIENT
Start: 2020-11-17 | End: 2020-11-18 | Stop reason: HOSPADM

## 2020-11-17 RX ORDER — TRAZODONE HYDROCHLORIDE 50 MG/1
50 TABLET ORAL NIGHTLY
Status: DISCONTINUED | OUTPATIENT
Start: 2020-11-17 | End: 2020-11-18 | Stop reason: HOSPADM

## 2020-11-17 RX ORDER — IBUPROFEN 200 MG
16 TABLET ORAL
Status: DISCONTINUED | OUTPATIENT
Start: 2020-11-17 | End: 2020-11-18 | Stop reason: HOSPADM

## 2020-11-17 RX ORDER — SODIUM CHLORIDE 9 MG/ML
1000 INJECTION, SOLUTION INTRAVENOUS
Status: COMPLETED | OUTPATIENT
Start: 2020-11-17 | End: 2020-11-17

## 2020-11-17 RX ORDER — GLUCAGON 1 MG
1 KIT INJECTION
Status: DISCONTINUED | OUTPATIENT
Start: 2020-11-17 | End: 2020-11-18 | Stop reason: HOSPADM

## 2020-11-17 RX ORDER — IBUPROFEN 200 MG
24 TABLET ORAL
Status: DISCONTINUED | OUTPATIENT
Start: 2020-11-17 | End: 2020-11-18 | Stop reason: HOSPADM

## 2020-11-17 RX ADMIN — TRAZODONE HYDROCHLORIDE 50 MG: 50 TABLET ORAL at 11:11

## 2020-11-17 RX ADMIN — SODIUM CHLORIDE 1000 ML: 0.9 INJECTION, SOLUTION INTRAVENOUS at 09:11

## 2020-11-17 RX ADMIN — SODIUM CHLORIDE: 0.9 INJECTION, SOLUTION INTRAVENOUS at 11:11

## 2020-11-17 RX ADMIN — HYDROCODONE BITARTRATE AND ACETAMINOPHEN 1 TABLET: 5; 325 TABLET ORAL at 04:11

## 2020-11-17 RX ADMIN — PANTOPRAZOLE SODIUM 40 MG: 40 INJECTION, POWDER, LYOPHILIZED, FOR SOLUTION INTRAVENOUS at 11:11

## 2020-11-17 RX ADMIN — SODIUM ZIRCONIUM CYCLOSILICATE 10 G: 10 POWDER, FOR SUSPENSION ORAL at 11:11

## 2020-11-17 RX ADMIN — SODIUM ZIRCONIUM CYCLOSILICATE 10 G: 10 POWDER, FOR SUSPENSION ORAL at 09:11

## 2020-11-17 RX ADMIN — ACETAMINOPHEN 1000 MG: 500 TABLET ORAL at 09:11

## 2020-11-17 RX ADMIN — POLYETHYLENE GLYCOL-3350 AND ELECTROLYTES WITH FLAVOR PACK 4000 ML: 240; 5.84; 2.98; 6.72; 22.72 POWDER, FOR SOLUTION ORAL at 08:11

## 2020-11-17 RX ADMIN — CARVEDILOL 25 MG: 25 TABLET, FILM COATED ORAL at 05:11

## 2020-11-17 RX ADMIN — SODIUM ZIRCONIUM CYCLOSILICATE 10 G: 10 POWDER, FOR SUSPENSION ORAL at 04:11

## 2020-11-17 RX ADMIN — PHYTONADIONE 10 MG: 10 INJECTION, EMULSION INTRAMUSCULAR; INTRAVENOUS; SUBCUTANEOUS at 10:11

## 2020-11-17 RX ADMIN — PANTOPRAZOLE SODIUM 40 MG: 40 INJECTION, POWDER, LYOPHILIZED, FOR SOLUTION INTRAVENOUS at 09:11

## 2020-11-17 NOTE — ED NOTES
"Richard Bustos presents to the ED with c/o rectal bleeding with clots that "filled an entire pull-up."  Patient and family give history of gastric bypass and previous GI bleed.  Patient was to follow up with GI but had increasing concerns with amount of blood.  "

## 2020-11-17 NOTE — CONSULTS
[facility]     CC: Blood in stool    HPI 75 y.o. male with blood in stool, onset few days ago, red in color, associated with some loose stool, with no abdominal pain, and with no alleviating/exacerbating factors.  Problem described as mild to moderate.  Patient on warfarin with therapeutic INR secondary to CAD.  He states that he had colonoscopy a few years ago and had polyps.  Denies abdominal pain.  Denies UGI complaints.  No other issues currently.    Past Medical History:   Diagnosis Date    Anemia     Anemia of other chronic disease 9/13/2017    Anemia, chronic renal failure 9/13/2017    Anemia, unspecified 9/13/2017    Anticoagulant long-term use     Aorta aneurysm     Arthritis     Atrial fibrillation     CAD (coronary artery disease)     CHF (congestive heart failure)     Chronic kidney disease     Clotting disorder 9/13/2017    Colon polyp     Dementia     Diabetes mellitus     not on meds    Diabetes mellitus type II     Diverticulosis     Elevated PSA     Encounter for blood transfusion     Former smoker     General anesthetics causing adverse effect in therapeutic use     TROUBLE COMING OUT OF ANESTHESIA WITH GASTRIC BYPASS    GERD (gastroesophageal reflux disease)     Gout     Hx of colonic polyps     Hypercoagulable state     Hyperlipidemia     Hypertension     MI, old 2010    MTHFR mutation     Myocardial infarction     9/2010    Osteomyelitis of ankle or foot 3/9/2017    Prostate cancer 06/2016    Sleep apnea     Squamous cell carcinoma 01/23/2018    Left helix, imiquimod    Venous stasis     Chronic       Past Surgical History:   Procedure Laterality Date    ABDOMINAL SURGERY      CARDIAC SURGERY      3 STENTS     CAUDAL EPIDURAL STEROID INJECTION N/A 6/22/2020    Procedure: INJECTION, STEROID, SPINE, EPIDURAL, CAUDAL;  Surgeon: Evangelist Bose MD;  Location: [facility] OR;  Service: Pain Management;  Laterality: N/A;    COLONOSCOPY  02/2011     diverticulosis with diverticula with clot, no active bleeding    COLONOSCOPY N/A 8/24/2016    Procedure: COLONOSCOPY;  Surgeon: Saroj Borjas MD;  Location: [facility] ENDO;  Service: Endoscopy;  Laterality: N/A;    EPIDURAL STEROID INJECTION INTO LUMBAR SPINE N/A 6/22/2020    Procedure: Injection-steroid-epidural-lumbar;  Surgeon: Evangelist Bose MD;  Location: [facility] OR;  Service: Pain Management;  Laterality: N/A;  L3 L4 L5 S1    EPIDURAL STEROID INJECTION INTO LUMBAR SPINE N/A 9/21/2020    Procedure: Injection-steroid-epidural-lumbar;  Surgeon: Evangelist Bose MD;  Location: [facility] OR;  Service: Pain Management;  Laterality: N/A;  L5-S1    GASTRIC BYPASS  2/5/2008    IVC FILTER RETRIEVAL      JOINT REPLACEMENT  1996 and 2001    bi-lat hip replacement/Rt Hip and Lt Hip    RADIOFREQUENCY ABLATION OF LUMBAR MEDIAL BRANCH NERVE AT SINGLE LEVEL Bilateral 1/10/2020    Procedure: Radiofrequency Ablation, Nerve, Spinal, Lumbar, Medial Branch, 1 Level;  Surgeon: Evangelist Bose MD;  Location: [facility] OR;  Service: Pain Management;  Laterality: Bilateral;  L3,L4,L5    ROTATOR CUFF REPAIR      Rt shoulder    Stents  8/18/2010    x 3    UPPER GASTROINTESTINAL ENDOSCOPY  02/2011       Social History     Tobacco Use    Smoking status: Former Smoker    Smokeless tobacco: Never Used    Tobacco comment: 45 yrs ago, only smoked 3-4 packs in his entire life as a teenager   Substance Use Topics    Alcohol use: Not Currently     Comment: rare    Drug use: No       Family History   Problem Relation Age of Onset    Heart attack Mother     Heart attack Father     Heart attack Brother     Ulcerative colitis Daughter 35    Lupus Daughter     Colon cancer Neg Hx     Colon polyps Neg Hx     Crohn's disease Neg Hx     Melanoma Neg Hx     Psoriasis Neg Hx     Eczema Neg Hx        Review of Systems  General ROS: negative for - chills, fever or weight loss  Psychological ROS: negative for - hallucination, depression or  suicidal ideation  Ophthalmic ROS: negative for - blurry vision, photophobia or eye pain  ENT ROS: negative for - epistaxis, sore throat or rhinorrhea  Respiratory ROS: no cough, shortness of breath, or wheezing  Cardiovascular ROS: no chest pain or dyspnea on exertion  Gastrointestinal ROS: + blood in stool  Genito-Urinary ROS: no dysuria, trouble voiding, or hematuria  Musculoskeletal ROS: negative for - arthralgia, myalgia, weakness  Neurological ROS: no syncope or seizures; no ataxia  Dermatological ROS: negative for pruritis, rash and jaundice    Physical Examination  /64   Pulse (!) 55   Temp 96.5 °F (35.8 °C)   Resp 16   Wt 118.8 kg (262 lb)   SpO2 98%   BMI 38.69 kg/m²   General appearance: alert, cooperative, no distress  HENT: Normocephalic, atraumatic, neck symmetrical, no nasal discharge   Eyes: conjunctivae/corneas clear, PERRL, EOM's intact, sclera anicteric  Lungs: clear to auscultation bilaterally, no dullness to percussion bilaterally, symmetric expansion, breathing unlabored  Heart: regular rate and rhythm without rub; no displacement of the PMI   Abdomen: obese, NT  Extremities: extremities symmetric; no clubbing, cyanosis, or edema  Integument: Skin color, texture, turgor normal; no rashes; hair distrubution normal, no jaundice  Neurologic: Alert and oriented X 3, no focal sensory or motor neurologic deficits  Psychiatric: no pressured speech; normal affect; no evidence of impaired cognition, no anxiety/depression     Labs:  Lab Results   Component Value Date    WBC 6.22 11/17/2020    HGB 9.5 (L) 11/17/2020    HCT 29.6 (L) 11/17/2020     (H) 11/17/2020     11/17/2020         CMP  Sodium   Date Value Ref Range Status   11/17/2020 144 136 - 145 mmol/L Final     Potassium   Date Value Ref Range Status   11/17/2020 5.0 3.5 - 5.1 mmol/L Final     Chloride   Date Value Ref Range Status   11/17/2020 116 (H) 95 - 110 mmol/L Final     CO2   Date Value Ref Range Status    11/17/2020 22 (L) 23 - 29 mmol/L Final     Glucose   Date Value Ref Range Status   11/17/2020 121 (H) 70 - 110 mg/dL Final     BUN   Date Value Ref Range Status   11/17/2020 36 (H) 8 - 23 mg/dL Final     Creatinine   Date Value Ref Range Status   11/17/2020 1.7 (H) 0.5 - 1.4 mg/dL Final     Calcium   Date Value Ref Range Status   11/17/2020 9.1 8.7 - 10.5 mg/dL Final     Total Protein   Date Value Ref Range Status   11/17/2020 6.3 6.0 - 8.4 g/dL Final     Albumin   Date Value Ref Range Status   11/17/2020 2.9 (L) 3.5 - 5.2 g/dL Final     Total Bilirubin   Date Value Ref Range Status   11/17/2020 0.4 0.1 - 1.0 mg/dL Final     Comment:     For infants and newborns, interpretation of results should be based  on gestational age, weight and in agreement with clinical  observations.  Premature Infant recommended reference ranges:  Up to 24 hours.............<8.0 mg/dL  Up to 48 hours............<12.0 mg/dL  3-5 days..................<15.0 mg/dL  6-29 days.................<15.0 mg/dL       Alkaline Phosphatase   Date Value Ref Range Status   11/17/2020 86 55 - 135 U/L Final     AST   Date Value Ref Range Status   11/17/2020 22 10 - 40 U/L Final     ALT   Date Value Ref Range Status   11/17/2020 18 10 - 44 U/L Final     Anion Gap   Date Value Ref Range Status   11/17/2020 6 (L) 8 - 16 mmol/L Final     eGFR if    Date Value Ref Range Status   11/17/2020 45 (A) >60 mL/min/1.73 m^2 Final     eGFR if non    Date Value Ref Range Status   11/17/2020 39 (A) >60 mL/min/1.73 m^2 Final     Comment:     Calculation used to obtain the estimated glomerular filtration  rate (eGFR) is the CKD-EPI equation.        Lab Results   Component Value Date    IRON 67 08/17/2020    TIBC 206 (L) 08/17/2020    FERRITIN 732 (H) 08/17/2020         Imaging:  Past CXR was independently visualized and reviewed by me and showed cardiomegaly.    I have personally reviewed these images    Case discussed with nursing who  relates no bleeding since admission.        Assessment:   1.  CAD  2.  Therapeutic INR on warfarin  3.  Blood in stool  4.  Anemia of chronic disease    Plan:  1.  Clear liquid diet  2.  INR correction per primary team  3.  Colonoscopy tomorrow to further evaluate for bleeding source  4.  Further recommendations to follow after above.  5.  Communication will be sent to the referring provider regarding my assessment and plan on this patient via EPIC.      Saroj Silveira MD  [facility]  [address], [address]  [place] [address]  Office: [phone]  Fax: [phone]

## 2020-11-17 NOTE — NURSING
Spoke to House supervisor, Mayo ,and he okayed one family member staying over night with patient to help with care and help prevent falls.

## 2020-11-17 NOTE — ED PROVIDER NOTES
I, Dr. Rodrigo Bocanegra III, personally performed the services described in this documentation. All medical record entries made by the scribe were at my direction and in my presence.  I have reviewed the chart and agree that the record reflects my personal performance and is accurate and complete Encounter Date: 11/17/2020    SCRIBE #1 NOTE: I, Anupama Gotti, am scribing for, and in the presence of, Rodrigo Bocanegra MD.       History     Chief Complaint   Patient presents with    Rectal Bleeding     for a few days       Time seen by provider: 6:12 AM on 11/17/2020    Richard Bustos is a 75 y.o. male who presents to the ED with an onset of blood in the stool for 5-6 days and mild dizziness today. Patient states blood was initially darker brown but he is now passing blood clots. The patient denies abdominal pain, nausea, vomiting or any other symptoms at this time. Patient has a PMHx of anemia, GERD, colon polyps, A-fib and anticoagulant long-term use. Patient's PSHx includes gastric bypass.    The history is provided by the patient.     Review of patient's allergies indicates:   Allergen Reactions    Donepezil Other (See Comments)     AMS    Bactroban [mupirocin calcium] Blisters     Causes Blisters    Shellfish containing products Other (See Comments)     Other reaction(s): Gout  [facility]     Past Medical History:   Diagnosis Date    Anemia     Anemia of other chronic disease 9/13/2017    Anemia, chronic renal failure 9/13/2017    Anemia, unspecified 9/13/2017    Anticoagulant long-term use     Aorta aneurysm     Arthritis     Atrial fibrillation     CAD (coronary artery disease)     CHF (congestive heart failure)     Chronic kidney disease     Clotting disorder 9/13/2017    Colon polyp     Dementia     Diabetes mellitus     not on meds    Diabetes mellitus type II     Diverticulosis     Elevated PSA     Encounter for blood transfusion     Former smoker     General anesthetics causing adverse  effect in therapeutic use     TROUBLE COMING OUT OF ANESTHESIA WITH GASTRIC BYPASS    GERD (gastroesophageal reflux disease)     Gout     Hx of colonic polyps     Hypercoagulable state     Hyperlipidemia     Hypertension     MI, old 2010    MTHFR mutation     Myocardial infarction     9/2010    Osteomyelitis of ankle or foot 3/9/2017    Prostate cancer 06/2016    Sleep apnea     Squamous cell carcinoma 01/23/2018    Left helix, imiquimod    Venous stasis     Chronic     Past Surgical History:   Procedure Laterality Date    ABDOMINAL SURGERY      CARDIAC SURGERY      3 STENTS     CAUDAL EPIDURAL STEROID INJECTION N/A 6/22/2020    Procedure: INJECTION, STEROID, SPINE, EPIDURAL, CAUDAL;  Surgeon: Evangelist Bose MD;  Location: [facility] OR;  Service: Pain Management;  Laterality: N/A;    COLONOSCOPY  02/2011    diverticulosis with diverticula with clot, no active bleeding    COLONOSCOPY N/A 8/24/2016    Procedure: COLONOSCOPY;  Surgeon: Saroj Borjas MD;  Location: [facility];  Service: Endoscopy;  Laterality: N/A;    EPIDURAL STEROID INJECTION INTO LUMBAR SPINE N/A 6/22/2020    Procedure: Injection-steroid-epidural-lumbar;  Surgeon: Evangelist Bose MD;  Location: [facility] OR;  Service: Pain Management;  Laterality: N/A;  L3 L4 L5 S1    EPIDURAL STEROID INJECTION INTO LUMBAR SPINE N/A 9/21/2020    Procedure: Injection-steroid-epidural-lumbar;  Surgeon: Evangelist Bose MD;  Location: [facility] OR;  Service: Pain Management;  Laterality: N/A;  L5-S1    GASTRIC BYPASS  2/5/2008    IVC FILTER RETRIEVAL      JOINT REPLACEMENT  1996 and 2001    bi-lat hip replacement/Rt Hip and Lt Hip    RADIOFREQUENCY ABLATION OF LUMBAR MEDIAL BRANCH NERVE AT SINGLE LEVEL Bilateral 1/10/2020    Procedure: Radiofrequency Ablation, Nerve, Spinal, Lumbar, Medial Branch, 1 Level;  Surgeon: Evangelist Bose MD;  Location: [facility] OR;  Service: Pain Management;  Laterality: Bilateral;  L3,L4,L5    ROTATOR CUFF REPAIR      Rt  shoulder    Stents  8/18/2010    x 3    UPPER GASTROINTESTINAL ENDOSCOPY  02/2011     Family History   Problem Relation Age of Onset    Heart attack Mother     Heart attack Father     Heart attack Brother     Ulcerative colitis Daughter 35    Lupus Daughter     Colon cancer Neg Hx     Colon polyps Neg Hx     Crohn's disease Neg Hx     Melanoma Neg Hx     Psoriasis Neg Hx     Eczema Neg Hx      Social History     Tobacco Use    Smoking status: Former Smoker    Smokeless tobacco: Never Used    Tobacco comment: 45 yrs ago, only smoked 3-4 packs in his entire life as a teenager   Substance Use Topics    Alcohol use: Not Currently     Comment: rare    Drug use: No     Review of Systems   Constitutional: Negative for activity change, appetite change, chills, fatigue and fever.   Eyes: Negative for visual disturbance.   Respiratory: Negative for apnea and shortness of breath.    Cardiovascular: Negative for chest pain and palpitations.   Gastrointestinal: Positive for blood in stool. Negative for abdominal distention, abdominal pain, nausea and vomiting.   Genitourinary: Negative for difficulty urinating.   Musculoskeletal: Negative for neck pain.   Skin: Negative for pallor and rash.   Neurological: Negative for headaches.   Hematological: Bruises/bleeds easily.   Psychiatric/Behavioral: Negative for agitation.       Physical Exam     Initial Vitals [11/17/20 0528]   BP Pulse Resp Temp SpO2   121/61 (!) 57 16 97.9 °F (36.6 °C) 98 %      MAP       --         Physical Exam    Nursing note and vitals reviewed.  Constitutional: He appears well-developed and well-nourished.   HENT:   Head: Normocephalic and atraumatic.   Eyes: Conjunctivae are normal.   Neck: Normal range of motion. Neck supple.   Cardiovascular: Normal rate, regular rhythm and normal heart sounds. Exam reveals no gallop and no friction rub.    No murmur heard.  Pulmonary/Chest: Breath sounds normal. No respiratory distress. He has no  wheezes. He has no rhonchi. He has no rales.   Abdominal: Soft. He exhibits no distension. There is no abdominal tenderness.   Musculoskeletal: Normal range of motion.   Neurological: He is alert and oriented to person, place, and time.   Skin: Skin is warm and dry.   Psychiatric: He has a normal mood and affect.         ED Course   Critical Care    Date/Time: 11/17/2020 3:19 PM  Performed by: Rodrigo Bocanegra III, MD  Authorized by: Anshul Mccarthy MD   Direct patient critical care time: 60 minutes  Total critical care time (exclusive of procedural time) : 60 minutes  Critical care was necessary to treat or prevent imminent or life-threatening deterioration of the following conditions: metabolic crisis.  Critical care was time spent personally by me on the following activities: review of old charts, pulse oximetry, ordering and review of laboratory studies, obtaining history from patient or surrogate, development of treatment plan with patient or surrogate, discussions with primary provider, examination of patient, ordering and performing treatments and interventions and ordering and review of radiographic studies.  Subsequent provider of critical care: I assumed direction of critical care for this patient from another provider of my specialty.        Labs Reviewed   APTT - Abnormal; Notable for the following components:       Result Value    aPTT 43.0 (*)     All other components within normal limits   PROTIME-INR - Abnormal; Notable for the following components:    Prothrombin Time 34.3 (*)     INR 3.3 (*)     All other components within normal limits   CBC W/ AUTO DIFFERENTIAL - Abnormal; Notable for the following components:    RBC 2.93 (*)     Hemoglobin 9.9 (*)     Hematocrit 30.7 (*)      (*)     MCH 33.8 (*)     Immature Granulocytes 0.9 (*)     Immature Grans (Abs) 0.06 (*)     All other components within normal limits   COMPREHENSIVE METABOLIC PANEL - Abnormal; Notable for the following  components:    Potassium 6.0 (*)     Chloride 116 (*)     CO2 22 (*)     Glucose 121 (*)     BUN 36 (*)     Creatinine 1.7 (*)     Albumin 2.9 (*)     Anion Gap 6 (*)     eGFR if  45 (*)     eGFR if non  39 (*)     All other components within normal limits   SARS-COV-2 RNA AMPLIFICATION, QUAL     EKG Readings: (Independently Interpreted)   Rhythm: Sinus Bradycardia. Heart Rate: 57. Ectopy: No Ectopy PVCs. Conduction: RBBB. ST Segments: Normal ST Segments. T Waves: Normal. Axis: Normal. Clinical Impression: Sinus Bradycardia     ECG Results          EKG 12-lead (In process)  Result time 11/17/20 08:58:11    In process by Interface, Lab In [facility] (11/17/20 08:58:11)                 Narrative:    Test Reason : E87.5,    Vent. Rate : 057 BPM     Atrial Rate : 057 BPM     P-R Int : 156 ms          QRS Dur : 140 ms      QT Int : 464 ms       P-R-T Axes : 066 003 -13 degrees     QTc Int : 451 ms    Sinus bradycardia with Premature atrial complexes with Aberrant conduction  Right bundle branch block  Abnormal ECG  When compared with ECG of 22-JUL-2020 14:08,  Previous ECG has undetermined rhythm, needs review  Nonspecific T wave abnormality now evident in Anterior leads    Referred By: [email or web address]   SELF           Confirmed By:                             Imaging Results    None          Medical Decision Making:   History:   Old Medical Records: I decided to obtain old medical records.  Independently Interpreted Test(s):   I have ordered and independently interpreted EKG Reading(s) - see prior notes  Clinical Tests:   Lab Tests: Ordered and Reviewed  Medical Tests: Ordered and Reviewed  ED Management:  75-year-old male presents with a 4 day history of worsening rectal bleeding.  He is chronically anticoagulated.  He is hemodynamically stable and has a stable hemoglobin; however, the volume of the hematochezia warrants inpatient investigation.  He is also found to have hyperkalemia without EKG  changes.  He will be hydrated and his ACE-inhibitor will be discontinued.  Other:   I have discussed this case with another health care provider.       <> Summary of the Discussion: Discussed with Dr. Mccarthy who will admit the patient            Scribe Attestation:   Scribe #1: I performed the above scribed service and the documentation accurately describes the services I performed. I attest to the accuracy of the note.                      Clinical Impression:     ICD-10-CM ICD-9-CM   1. Rectal bleeding  K62.5 569.3   2. Hyperkalemia  E87.5 276.7                      Disposition:   Disposition: Admitted                          Rodrigo Bocanegra III, MD  11/17/20 [id number]

## 2020-11-18 ENCOUNTER — ANESTHESIA EVENT (OUTPATIENT)
Dept: ENDOSCOPY | Facility: HOSPITAL | Age: 75
DRG: 379 | End: 2020-11-18
Payer: MEDICARE

## 2020-11-18 ENCOUNTER — ANESTHESIA (OUTPATIENT)
Dept: ENDOSCOPY | Facility: HOSPITAL | Age: 75
DRG: 379 | End: 2020-11-18
Payer: MEDICARE

## 2020-11-18 VITALS
HEART RATE: 70 BPM | WEIGHT: 261.94 LBS | RESPIRATION RATE: 18 BRPM | OXYGEN SATURATION: 95 % | HEIGHT: 69 IN | DIASTOLIC BLOOD PRESSURE: 87 MMHG | BODY MASS INDEX: 38.8 KG/M2 | SYSTOLIC BLOOD PRESSURE: 152 MMHG | TEMPERATURE: 96 F

## 2020-11-18 PROBLEM — E87.5 HYPERKALEMIA: Status: RESOLVED | Noted: 2020-11-17 | Resolved: 2020-11-18

## 2020-11-18 PROBLEM — K62.5 RECTAL BLEEDING: Status: RESOLVED | Noted: 2020-11-18 | Resolved: 2020-11-18

## 2020-11-18 PROBLEM — K92.2 GI BLEED: Status: RESOLVED | Noted: 2020-11-17 | Resolved: 2020-11-18

## 2020-11-18 PROBLEM — K62.5 RECTAL BLEEDING: Status: ACTIVE | Noted: 2020-11-18

## 2020-11-18 LAB
ANION GAP SERPL CALC-SCNC: 5 MMOL/L (ref 8–16)
BASOPHILS # BLD AUTO: 0.02 K/UL (ref 0–0.2)
BASOPHILS # BLD AUTO: 0.02 K/UL (ref 0–0.2)
BASOPHILS NFR BLD: 0.3 % (ref 0–1.9)
BASOPHILS NFR BLD: 0.3 % (ref 0–1.9)
BUN SERPL-MCNC: 25 MG/DL (ref 8–23)
CALCIUM SERPL-MCNC: 8.6 MG/DL (ref 8.7–10.5)
CHLORIDE SERPL-SCNC: 113 MMOL/L (ref 95–110)
CO2 SERPL-SCNC: 23 MMOL/L (ref 23–29)
CREAT SERPL-MCNC: 1.4 MG/DL (ref 0.5–1.4)
DIFFERENTIAL METHOD: ABNORMAL
DIFFERENTIAL METHOD: ABNORMAL
EOSINOPHIL # BLD AUTO: 0.2 K/UL (ref 0–0.5)
EOSINOPHIL # BLD AUTO: 0.2 K/UL (ref 0–0.5)
EOSINOPHIL NFR BLD: 2.4 % (ref 0–8)
EOSINOPHIL NFR BLD: 2.5 % (ref 0–8)
ERYTHROCYTE [DISTWIDTH] IN BLOOD BY AUTOMATED COUNT: 13.8 % (ref 11.5–14.5)
ERYTHROCYTE [DISTWIDTH] IN BLOOD BY AUTOMATED COUNT: 13.9 % (ref 11.5–14.5)
EST. GFR  (AFRICAN AMERICAN): 57 ML/MIN/1.73 M^2
EST. GFR  (NON AFRICAN AMERICAN): 49 ML/MIN/1.73 M^2
ESTIMATED AVG GLUCOSE: 128 MG/DL (ref 68–131)
GLUCOSE SERPL-MCNC: 113 MG/DL (ref 70–110)
HBA1C MFR BLD HPLC: 6.1 % (ref 4–5.6)
HCT VFR BLD AUTO: 26.9 % (ref 40–54)
HCT VFR BLD AUTO: 29.1 % (ref 40–54)
HGB BLD-MCNC: 8.7 G/DL (ref 14–18)
HGB BLD-MCNC: 9.2 G/DL (ref 14–18)
IMM GRANULOCYTES # BLD AUTO: 0.03 K/UL (ref 0–0.04)
IMM GRANULOCYTES # BLD AUTO: 0.04 K/UL (ref 0–0.04)
IMM GRANULOCYTES NFR BLD AUTO: 0.5 % (ref 0–0.5)
IMM GRANULOCYTES NFR BLD AUTO: 0.7 % (ref 0–0.5)
INR PPP: 1.7 (ref 0.8–1.2)
INR PPP: 2.2 (ref 0.8–1.2)
LYMPHOCYTES # BLD AUTO: 1 K/UL (ref 1–4.8)
LYMPHOCYTES # BLD AUTO: 1.1 K/UL (ref 1–4.8)
LYMPHOCYTES NFR BLD: 15.7 % (ref 18–48)
LYMPHOCYTES NFR BLD: 17.4 % (ref 18–48)
MCH RBC QN AUTO: 33 PG (ref 27–31)
MCH RBC QN AUTO: 33.3 PG (ref 27–31)
MCHC RBC AUTO-ENTMCNC: 31.6 G/DL (ref 32–36)
MCHC RBC AUTO-ENTMCNC: 32.3 G/DL (ref 32–36)
MCV RBC AUTO: 103 FL (ref 82–98)
MCV RBC AUTO: 104 FL (ref 82–98)
MONOCYTES # BLD AUTO: 0.4 K/UL (ref 0.3–1)
MONOCYTES # BLD AUTO: 0.4 K/UL (ref 0.3–1)
MONOCYTES NFR BLD: 7 % (ref 4–15)
MONOCYTES NFR BLD: 7.1 % (ref 4–15)
NEUTROPHILS # BLD AUTO: 4.4 K/UL (ref 1.8–7.7)
NEUTROPHILS # BLD AUTO: 4.7 K/UL (ref 1.8–7.7)
NEUTROPHILS NFR BLD: 72 % (ref 38–73)
NEUTROPHILS NFR BLD: 74.1 % (ref 38–73)
NRBC BLD-RTO: 0 /100 WBC
NRBC BLD-RTO: 0 /100 WBC
PLATELET # BLD AUTO: 184 K/UL (ref 150–350)
PLATELET # BLD AUTO: 203 K/UL (ref 150–350)
PMV BLD AUTO: 10.3 FL (ref 9.2–12.9)
PMV BLD AUTO: 10.6 FL (ref 9.2–12.9)
POCT GLUCOSE: 139 MG/DL (ref 70–110)
POCT GLUCOSE: 172 MG/DL (ref 70–110)
POTASSIUM SERPL-SCNC: 4.5 MMOL/L (ref 3.5–5.1)
PROTHROMBIN TIME: 18 SEC (ref 9–12.5)
PROTHROMBIN TIME: 23.2 SEC (ref 9–12.5)
RBC # BLD AUTO: 2.61 M/UL (ref 4.6–6.2)
RBC # BLD AUTO: 2.79 M/UL (ref 4.6–6.2)
SODIUM SERPL-SCNC: 141 MMOL/L (ref 136–145)
WBC # BLD AUTO: 6.09 K/UL (ref 3.9–12.7)
WBC # BLD AUTO: 6.29 K/UL (ref 3.9–12.7)

## 2020-11-18 PROCEDURE — 80048 BASIC METABOLIC PNL TOTAL CA: CPT

## 2020-11-18 PROCEDURE — 63600175 PHARM REV CODE 636 W HCPCS: Performed by: PHYSICIAN ASSISTANT

## 2020-11-18 PROCEDURE — D9220A PRA ANESTHESIA: ICD-10-PCS | Mod: CRNA,,, | Performed by: NURSE ANESTHETIST, CERTIFIED REGISTERED

## 2020-11-18 PROCEDURE — 36415 COLL VENOUS BLD VENIPUNCTURE: CPT

## 2020-11-18 PROCEDURE — 63600175 PHARM REV CODE 636 W HCPCS: Performed by: HOSPITALIST

## 2020-11-18 PROCEDURE — 45382 PR COLONOSCOPY,FLEX,W/CONTROL, BLEEDING: ICD-10-PCS | Mod: ,,, | Performed by: INTERNAL MEDICINE

## 2020-11-18 PROCEDURE — 85610 PROTHROMBIN TIME: CPT

## 2020-11-18 PROCEDURE — 45382 COLONOSCOPY W/CONTROL BLEED: CPT | Mod: ,,, | Performed by: INTERNAL MEDICINE

## 2020-11-18 PROCEDURE — 25000003 PHARM REV CODE 250: Performed by: HOSPITALIST

## 2020-11-18 PROCEDURE — 37000008 HC ANESTHESIA 1ST 15 MINUTES: Performed by: INTERNAL MEDICINE

## 2020-11-18 PROCEDURE — 27202087 HC PROBE, APC: Performed by: INTERNAL MEDICINE

## 2020-11-18 PROCEDURE — D9220A PRA ANESTHESIA: Mod: CRNA,,, | Performed by: NURSE ANESTHETIST, CERTIFIED REGISTERED

## 2020-11-18 PROCEDURE — 37000009 HC ANESTHESIA EA ADD 15 MINS: Performed by: INTERNAL MEDICINE

## 2020-11-18 PROCEDURE — 45382 COLONOSCOPY W/CONTROL BLEED: CPT | Performed by: INTERNAL MEDICINE

## 2020-11-18 PROCEDURE — D9220A PRA ANESTHESIA: ICD-10-PCS | Mod: ANES,,, | Performed by: ANESTHESIOLOGY

## 2020-11-18 PROCEDURE — C9113 INJ PANTOPRAZOLE SODIUM, VIA: HCPCS | Performed by: HOSPITALIST

## 2020-11-18 PROCEDURE — 85025 COMPLETE CBC W/AUTO DIFF WBC: CPT

## 2020-11-18 PROCEDURE — 63600175 PHARM REV CODE 636 W HCPCS: Performed by: NURSE ANESTHETIST, CERTIFIED REGISTERED

## 2020-11-18 PROCEDURE — 85610 PROTHROMBIN TIME: CPT | Mod: 91

## 2020-11-18 PROCEDURE — 25000003 PHARM REV CODE 250: Performed by: NURSE ANESTHETIST, CERTIFIED REGISTERED

## 2020-11-18 PROCEDURE — D9220A PRA ANESTHESIA: Mod: ANES,,, | Performed by: ANESTHESIOLOGY

## 2020-11-18 RX ORDER — PROPOFOL 10 MG/ML
VIAL (ML) INTRAVENOUS
Status: DISCONTINUED | OUTPATIENT
Start: 2020-11-18 | End: 2020-11-18

## 2020-11-18 RX ORDER — LIDOCAINE HCL/PF 100 MG/5ML
SYRINGE (ML) INTRAVENOUS
Status: DISCONTINUED | OUTPATIENT
Start: 2020-11-18 | End: 2020-11-18

## 2020-11-18 RX ORDER — SODIUM CHLORIDE 9 MG/ML
INJECTION, SOLUTION INTRAVENOUS CONTINUOUS
Status: DISCONTINUED | OUTPATIENT
Start: 2020-11-18 | End: 2020-11-18 | Stop reason: HOSPADM

## 2020-11-18 RX ORDER — MORPHINE SULFATE 2 MG/ML
2 INJECTION, SOLUTION INTRAMUSCULAR; INTRAVENOUS ONCE
Status: COMPLETED | OUTPATIENT
Start: 2020-11-18 | End: 2020-11-18

## 2020-11-18 RX ADMIN — SODIUM CHLORIDE: 0.9 INJECTION, SOLUTION INTRAVENOUS at 12:11

## 2020-11-18 RX ADMIN — HYDROCODONE BITARTRATE AND ACETAMINOPHEN 1 TABLET: 5; 325 TABLET ORAL at 12:11

## 2020-11-18 RX ADMIN — PHYTONADIONE 10 MG: 10 INJECTION, EMULSION INTRAMUSCULAR; INTRAVENOUS; SUBCUTANEOUS at 07:11

## 2020-11-18 RX ADMIN — PROPOFOL 50 MG: 10 INJECTION, EMULSION INTRAVENOUS at 01:11

## 2020-11-18 RX ADMIN — CARVEDILOL 25 MG: 25 TABLET, FILM COATED ORAL at 05:11

## 2020-11-18 RX ADMIN — MORPHINE SULFATE 2 MG: 2 INJECTION, SOLUTION INTRAMUSCULAR; INTRAVENOUS at 10:11

## 2020-11-18 RX ADMIN — LIDOCAINE HYDROCHLORIDE 100 MG: 20 INJECTION INTRAVENOUS at 01:11

## 2020-11-18 RX ADMIN — PANTOPRAZOLE SODIUM 40 MG: 40 INJECTION, POWDER, LYOPHILIZED, FOR SOLUTION INTRAVENOUS at 10:11

## 2020-11-18 NOTE — HPI
He presented to ED today complaining of bleeding from GI tract.  Has had dark brown diarrhea and then blood.  Started three days ago.  Associated with lightheadedness.  Not associated with abdominal pain.  Not associated with vomiting.  No chest pain.  No unusual shortness of breath.  He is on chronic warfarin for stroke prevention.  He has paroxysmal afib, CAD, HTN, and DM.  Has been in his usual state of health otherwise.

## 2020-11-18 NOTE — PLAN OF CARE
Placed a call to Lyndsay, pts daughter, to notify of pts discharge. She stated she will be at the hospital in about an hour.

## 2020-11-18 NOTE — ANESTHESIA PREPROCEDURE EVALUATION
11/18/2020  Richard Bustos is a 75 y.o., male.    Anesthesia Evaluation    I have reviewed the Patient Summary Reports.    I have reviewed the Nursing Notes. I have reviewed the NPO Status.   I have reviewed the Medications.     Review of Systems  Anesthesia Hx:  Hx of Anesthetic complications    Social:  Former Smoker    Hematology/Oncology:         -- Cancer in past history:   Cardiovascular:   Hypertension Past MI CAD   Angina CHF    Pulmonary:   Sleep Apnea    Renal/:   Chronic Renal Disease    Hepatic/GI:   GERD    Musculoskeletal:   Arthritis     Neurological:   Neuromuscular Disease,    Endocrine:   Diabetes    Psych:   Psychiatric History          Physical Exam  General:  Obesity    Airway/Jaw/Neck:  Airway Findings: Mouth Opening: Normal Tongue: Normal  General Airway Assessment: Adult  Mallampati: II  TM Distance: Normal, at least 6 cm        Eyes/Ears/Nose:  EYES/EARS/NOSE FINDINGS: Normal   Dental:  Dental Findings: In tact   Chest/Lungs:  Chest/Lungs Findings: Clear to auscultation, Normal Respiratory Rate     Heart/Vascular:  Heart Findings: Rate: Normal  Rhythm: Regular Rhythm        Mental Status:  Mental Status Findings:  Cooperative, Alert and Oriented         Anesthesia Plan  Type of Anesthesia, risks & benefits discussed:  Anesthesia Type:  general  Patient's Preference:   Intra-op Monitoring Plan: standard ASA monitors  Intra-op Monitoring Plan Comments:   Post Op Pain Control Plan:   Post Op Pain Control Plan Comments:   Induction:   IV  Beta Blocker:  Patient is on a Beta-Blocker and has received one dose within the past 24 hours (No further documentation required).       Informed Consent: Patient understands risks and agrees with Anesthesia plan.  Questions answered. Anesthesia consent signed with patient.  ASA Score: 4     Day of Surgery Review of History & Physical: I have  interviewed and examined the patient. I have reviewed the patient's H&P dated:    H&P update referred to the provider.         Ready For Surgery From Anesthesia Perspective.

## 2020-11-18 NOTE — ASSESSMENT & PLAN NOTE
Patient's anemia is currently controlled. Has not received any PRBCs to date.. Etiology likely d/t CKD  Current CBC reviewed-   Lab Results   Component Value Date    HGB 9.8 (L) 11/17/2020    HCT 30.5 (L) 11/17/2020     Monitor serial CBC and transfuse if patient becomes hemodynamically unstable, symptomatic or H/H drops below 7/21.

## 2020-11-18 NOTE — PLAN OF CARE
Problem: Adult Inpatient Plan of Care  Goal: Plan of Care Review  Outcome: Ongoing, Progressing   Reviewed with pt and daughter, expressed verbal understanding. Golytely bowel prep initiated as per MAR, tolerated well, by 0100 am pt admitted to being clear of stool, not visualized at present but will monitor future stools, also no bright red blood per rectum noted, daughter remains at BS 2/2 pt need for assistance and his dementia, approved by Mayo DELUCA daytime House Supervisor.  Problem: Adult Inpatient Plan of Care  Goal: Patient-Specific Goal (Individualization)  Outcome: Ongoing, Progressing   Pt will be absent of GI bleed, K+ WNL and VSS in 24-48 hrs.

## 2020-11-18 NOTE — [employer] PATIENT INSTRUCTIONS
Discharge Summary/Instructions after an Endoscopic Procedure  Patient Name: Richard Bustos  Patient MRN: [id number]  Patient YOB: 1945 Wednesday, November 18, 2020  Saroj Silveira MD  RESTRICTIONS:  During your procedure today, you received medications for sedation.  These   medications may affect your judgment, balance and coordination.  Therefore,   for 24 hours, you have the following restrictions:   - DO NOT drive a car, operate machinery, make legal/financial decisions,   sign important papers or drink alcohol.    ACTIVITY:  Today: no heavy lifting, straining or running due to procedural   sedation/anesthesia.  The following day: return to full activity including work.  DIET:  Eat and drink normally unless instructed otherwise.     TREATMENT FOR COMMON SIDE EFFECTS:  - Mild abdominal pain, nausea, belching, bloating or excessive gas:  rest,   eat lightly and use a heating pad.  - Sore Throat: treat with throat lozenges and/or gargle with warm salt   water.  - Because air was used during the procedure, expelling large amounts of air   from your rectum or belching is normal.  - If a bowel prep was taken, you may not have a bowel movement for 1-3 days.    This is normal.  SYMPTOMS TO WATCH FOR AND REPORT TO YOUR PHYSICIAN:  1. Abdominal pain or bloating, other than gas cramps.  2. Chest pain.  3. Back pain.  4. Signs of infection such as: chills or fever occurring within 24 hours   after the procedure.  5. Rectal bleeding, which would show as bright red, maroon, or black stools.   (A tablespoon of blood from the rectum is not serious, especially if   hemorrhoids are present.)  6. Vomiting.  7. Weakness or dizziness.  GO DIRECTLY TO THE NEAREST EMERGENCY ROOM IF YOU HAVE ANY OF THE FOLLOWING:      Difficulty breathing              Chills and/or fever over 101 F   Persistent vomiting and/or vomiting blood   Severe abdominal pain   Severe chest pain   Black, tarry stools   Bleeding- more than one  tablespoon   Any other symptom or condition that you feel may need urgent attention  Your doctor recommends these additional instructions:  If any biopsies were taken, your doctors clinic will contact you in 1 to 2   weeks with any results.  - Return patient to hospital ga for ongoing care.   - High fiber diet.   - Continue present medications.   - No repeat colonoscopy due to age and the absence of advanced adenomas.   - Miralax 1 capful (17 grams) in 8 ounces of water PO daily.  For questions, problems or results please call your physician - Saroj Silveira MD at Work:  [phone].  [facility], EMERGENCY ROOM PHONE NUMBER: [phone]  IF A COMPLICATION OR EMERGENCY SITUATION ARISES AND YOU ARE UNABLE TO REACH   YOUR PHYSICIAN - GO DIRECTLY TO THE EMERGENCY ROOM.  Saroj Silveira MD  11/18/2020 1:27:40 PM  This report has been verified and signed electronically.  [employer]

## 2020-11-18 NOTE — TRANSFER OF CARE
"Anesthesia Transfer of Care Note    Patient: Richard Bustos    Procedure(s) Performed: Procedure(s) (LRB):  COLONOSCOPY (N/A)    Patient location: GI    Anesthesia Type: general    Transport from OR: Transported from OR on room air with adequate spontaneous ventilation    Post pain: adequate analgesia    Post assessment: no apparent anesthetic complications and tolerated procedure well    Post vital signs: stable    Level of consciousness: awake, alert and oriented    Nausea/Vomiting: no nausea/vomiting    Complications: none    Transfer of care protocol was followed      Last vitals:   Visit Vitals  BP (!) (P) 85/52   Pulse (!) (P) 59   Temp 36.4 °C (97.5 °F) (Skin)   Resp (P) 15   Ht 5' 9" (1.753 m)   Wt 118.8 kg (261 lb 14.5 oz)   SpO2 (P) 95%   BMI 38.68 kg/m²     "

## 2020-11-18 NOTE — NURSING
Informed Dr Borjas of INR 2.2 this morning and also informed Jolene VALLE as per Dr Borjas's request for orders to reverse INR.

## 2020-11-18 NOTE — PROGRESS NOTES
Colonoscopy done.  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  Resume diet.  OK to discharge home.  Follow up as outpatient.  GI to sign off.  Call for questions.

## 2020-11-18 NOTE — ASSESSMENT & PLAN NOTE
Chronic, controlled.  Latest blood pressure and vitals reviewed-   Temp:  [96.2 °F (35.7 °C)-97.9 °F (36.6 °C)]   Pulse:  [54-64]   Resp:  [16-18]   BP: ()/(52-83)   SpO2:  [95 %-99 %] .   Home meds for hypertension were reviewed and noted below. Hospital anti-hypertensive changes were made as shown below.  Hypertension Medications at home            carvediloL (COREG) 25 MG tablet TAKE 1 TABLET(25 MG) BY MOUTH TWICE DAILY WITH MEALS    lisinopriL (PRINIVIL,ZESTRIL) 40 MG tablet Take 1 tablet (40 mg total) by mouth every evening.    nitroGLYCERIN 0.4 MG/DOSE TL SPRY (NITROLINGUAL) 400 mcg/spray spray PLACE 1 SPRAY UNDER THE TONGUE EVERY 5 MINUTES AS NEEDED FOR CHEST PAIN      Hospital Medications             carvediloL tablet 25 mg 25 mg, Oral, 2 times daily with meals        Will utilize p.r.n. blood pressure medication only if patient's blood pressure greater than  180/110 and he develops symptoms such as worsening chest pain or shortness of breath.

## 2020-11-18 NOTE — PLAN OF CARE
Cm reviewed the colonoscope results and contacted Dr. Mccarthy via secure chat about possible discharge.    4:10pm  Per Dr. Mccarthy, pt likely to discharge.     11/18/20 5651   Discharge Reassessment   Assessment Type Discharge Planning Reassessment

## 2020-11-18 NOTE — PLAN OF CARE
Patient recoverd post colonoscopy without complications.  Patient tolerated po fluids well.  No complaints of pain or discomfort at present time.  Abdomen soft and nontender.  Attempted to call report to Zahraa on PCU - was told she was at lunch and that I should return patient to unit and give report then.. Patient's vital signs stable and patient stable to transfer via wheelchair to PCU

## 2020-11-18 NOTE — SUBJECTIVE & OBJECTIVE
Past Medical History:   Diagnosis Date    Anemia     Anemia of other chronic disease 9/13/2017    Anemia, chronic renal failure 9/13/2017    Anemia, unspecified 9/13/2017    Anticoagulant long-term use     Aorta aneurysm     Arthritis     Atrial fibrillation     CAD (coronary artery disease)     CHF (congestive heart failure)     Chronic kidney disease     Clotting disorder 9/13/2017    Colon polyp     Dementia     Diabetes mellitus     not on meds    Diabetes mellitus type II     Diverticulosis     Elevated PSA     Encounter for blood transfusion     Former smoker     General anesthetics causing adverse effect in therapeutic use     TROUBLE COMING OUT OF ANESTHESIA WITH GASTRIC BYPASS    GERD (gastroesophageal reflux disease)     Gout     Hx of colonic polyps     Hypercoagulable state     Hyperlipidemia     Hypertension     MI, old 2010    MTHFR mutation     Myocardial infarction     9/2010    Osteomyelitis of ankle or foot 3/9/2017    Prostate cancer 06/2016    Sleep apnea     Squamous cell carcinoma 01/23/2018    Left helix, imiquimod    Venous stasis     Chronic       Past Surgical History:   Procedure Laterality Date    ABDOMINAL SURGERY      CARDIAC SURGERY      3 STENTS     CAUDAL EPIDURAL STEROID INJECTION N/A 6/22/2020    Procedure: INJECTION, STEROID, SPINE, EPIDURAL, CAUDAL;  Surgeon: Evangelist Bose MD;  Location: [facility] OR;  Service: Pain Management;  Laterality: N/A;    COLONOSCOPY  02/2011    diverticulosis with diverticula with clot, no active bleeding    COLONOSCOPY N/A 8/24/2016    Procedure: COLONOSCOPY;  Surgeon: Saroj Borjas MD;  Location: [facility];  Service: Endoscopy;  Laterality: N/A;    EPIDURAL STEROID INJECTION INTO LUMBAR SPINE N/A 6/22/2020    Procedure: Injection-steroid-epidural-lumbar;  Surgeon: Evangelist Bose MD;  Location: [facility] OR;  Service: Pain Management;  Laterality: N/A;  L3 L4 L5 S1    EPIDURAL STEROID INJECTION INTO LUMBAR  SPINE N/A 9/21/2020    Procedure: Injection-steroid-epidural-lumbar;  Surgeon: Evangelist Bose MD;  Location: [facility] OR;  Service: Pain Management;  Laterality: N/A;  L5-S1    GASTRIC BYPASS  2/5/2008    IVC FILTER RETRIEVAL      JOINT REPLACEMENT  1996 and 2001    bi-lat hip replacement/Rt Hip and Lt Hip    RADIOFREQUENCY ABLATION OF LUMBAR MEDIAL BRANCH NERVE AT SINGLE LEVEL Bilateral 1/10/2020    Procedure: Radiofrequency Ablation, Nerve, Spinal, Lumbar, Medial Branch, 1 Level;  Surgeon: Evangelist Bose MD;  Location: [facility] OR;  Service: Pain Management;  Laterality: Bilateral;  L3,L4,L5    ROTATOR CUFF REPAIR      Rt shoulder    Stents  8/18/2010    x 3    UPPER GASTROINTESTINAL ENDOSCOPY  02/2011       Review of patient's allergies indicates:   Allergen Reactions    Donepezil Other (See Comments)     AMS    Bactroban [mupirocin calcium] Blisters     Causes Blisters    Shellfish containing products Other (See Comments)     Other reaction(s): Gout  [facility]       Current Facility-Administered Medications on File Prior to Encounter   Medication    lactated ringers infusion     Current Outpatient Medications on File Prior to Encounter   Medication Sig    allopurinoL (ZYLOPRIM) 100 MG tablet TAKE 1 TABLET(100 MG) BY MOUTH EVERY DAY (Patient taking differently: every evening. )    aspirin (ECOTRIN) 81 MG EC tablet Take 81 mg by mouth once daily.    atorvastatin (LIPITOR) 80 MG tablet TAKE 1 TABLET(80 MG) BY MOUTH EVERY DAY (Patient taking differently: every evening. )    calcitRIOL (ROCALTROL) 0.5 MCG Cap 0.75 mcg once daily.     carvediloL (COREG) 25 MG tablet TAKE 1 TABLET(25 MG) BY MOUTH TWICE DAILY WITH MEALS    clopidogreL (PLAVIX) 75 mg tablet Take 1 tablet (75 mg total) by mouth once daily.    famotidine (PEPCID) 40 MG tablet Take 1 tablet (40 mg total) by mouth once daily.    ferrous sulfate (FEOSOL) 325 mg (65 mg iron) Tab tablet Take 1 tablet (325 mg total) by mouth 2 (two) times daily.     fluticasone propionate (FLONASE) 50 mcg/actuation nasal spray SHAKE LIQUID AND USE 2 SPRAYS(100 MCG) IN EACH NOSTRIL EVERY DAY    FOLIC ACID/MULTIVIT-MIN/LUTEIN (CENTRUM SILVER ORAL) Take 1 tablet by mouth once daily.    HYDROcodone-acetaminophen (NORCO) 5-325 mg per tablet Take 1 tablet by mouth every 8 (eight) hours as needed for Pain.    lisinopriL (PRINIVIL,ZESTRIL) 40 MG tablet Take 1 tablet (40 mg total) by mouth every evening.    magnesium oxide (MAG-OX) 400 mg (241.3 mg magnesium) tablet TAKE 1 TABLET BY MOUTH EVERY DAY    mecobal-levomefolat Ca-B6 phos (L-METHYL-B6-B12) 3-35-2 mg Tab Take 1 tablet by mouth 2 (two) times daily.    mirabegron (MYRBETRIQ) 50 mg Tb24 Take 1 tablet (50 mg total) by mouth once daily.    omeprazole (PRILOSEC) 20 MG capsule TAKE 1 CAPSULE BY MOUTH DAILY    vit A/vit C/vit E/zinc/copper (PRESERVISION AREDS ORAL) Take by mouth 2 (two) times a day.     HYDROcodone-acetaminophen (NORCO) 5-325 mg per tablet Take 1 tablet by mouth every 8 (eight) hours as needed for Pain.    nitroGLYCERIN 0.4 MG/DOSE TL SPRY (NITROLINGUAL) 400 mcg/spray spray PLACE 1 SPRAY UNDER THE TONGUE EVERY 5 MINUTES AS NEEDED FOR CHEST PAIN    warfarin (COUMADIN) 5 MG tablet Current dose: Take 5mg daily or as directed by coumadin clinic (Patient taking differently: 5 mg except on wednesday and saturday  Wed and sat 2.5 mgs)    [DISCONTINUED] ammonium lactate 12 % Crea Apply twice daily to affected parts both feet as needed.    [DISCONTINUED] calcium carbonate (TUMS ULTRA) 400 mg calcium (1,000 mg) Chew Take 1 tablet (400 mg total) by mouth once daily.    [DISCONTINUED] ciclopirox (PENLAC) 8 % Soln Apply topically nightly.    [DISCONTINUED] ergocalciferol (ERGOCALCIFEROL) 50,000 unit Cap ergocalciferol (vitamin D2) 1,250 mcg (50,000 unit) capsule   TK 1 C PO Q WK    [DISCONTINUED] furosemide (LASIX) 40 MG tablet Take 1 tablet (40 mg total) by mouth daily as needed.    [DISCONTINUED]  HYDROcodone-acetaminophen (NORCO) 5-325 mg per tablet Take 1 tablet by mouth every 8 (eight) hours as needed for Pain. (Patient not taking: Reported on 11/16/2020)    [DISCONTINUED] HYDROcodone-acetaminophen (NORCO) 5-325 mg per tablet Take 1 tablet by mouth every 8 (eight) hours as needed for Pain. (Patient not taking: Reported on 11/16/2020)    [DISCONTINUED] HYDROcodone-acetaminophen (NORCO) 5-325 mg per tablet Take 1 tablet by mouth every 8 (eight) hours as needed for Pain. (Patient not taking: Reported on 11/16/2020)    [DISCONTINUED] hydrocortisone 2.5 % cream Apply topically 2 (two) times daily. for 10 days    [DISCONTINUED] LORazepam (ATIVAN) 2 MG Tab Take 1 tablet (2 mg total) by mouth as needed (pre-MRI anxiety).    [DISCONTINUED] traMADoL (ULTRAM) 50 mg tablet Take 1 tablet (50 mg total) by mouth every 8 (eight) hours as needed for Pain.    [DISCONTINUED] traZODone (DESYREL) 50 MG tablet Take 1 tablet (50 mg total) by mouth every evening.    [DISCONTINUED] vit C-vit E-lutein-min-om-3 (OCUVITE) 345-40-1-150 mg-unit-mg-mg Cap Take 1 capsule by mouth once daily.     Family History     Problem Relation (Age of Onset)    Heart attack Mother, Father, Brother    Lupus Daughter    Ulcerative colitis Daughter (35)        Tobacco Use    Smoking status: Former Smoker    Smokeless tobacco: Never Used    Tobacco comment: 45 yrs ago, only smoked 3-4 packs in his entire life as a teenager   Substance and Sexual Activity    Alcohol use: Not Currently     Comment: rare    Drug use: No    Sexual activity: Not Currently     Review of Systems   Constitutional: Negative for chills, fatigue and fever.   HENT: Negative for congestion and sinus pressure.    Eyes: Negative for pain and visual disturbance.   Respiratory: Negative for cough, shortness of breath and wheezing.    Cardiovascular: Negative for chest pain, palpitations and leg swelling.   Gastrointestinal: Positive for blood in stool and diarrhea. Negative  for abdominal pain, nausea and vomiting.   Genitourinary: Negative for difficulty urinating, dysuria and hematuria.   Musculoskeletal: Negative for arthralgias and myalgias.   Skin: Negative for rash and wound.   Neurological: Positive for light-headedness. Negative for dizziness, weakness and headaches.   Hematological: Negative for adenopathy. Does not bruise/bleed easily.   Psychiatric/Behavioral: Negative for confusion and dysphoric mood. The patient is not nervous/anxious.      Objective:     Vital Signs (Most Recent):  Temp: 96.2 °F (35.7 °C) (11/17/20 1938)  Pulse: (!) 54 (11/17/20 1938)  Resp: 17 (11/17/20 1938)  BP: 126/60 (11/17/20 1938)  SpO2: 97 % (11/17/20 1938) Vital Signs (24h Range):  Temp:  [96.2 °F (35.7 °C)-97.9 °F (36.6 °C)] 96.2 °F (35.7 °C)  Pulse:  [54-64] 54  Resp:  [16-18] 17  SpO2:  [95 %-99 %] 97 %  BP: ()/(52-83) 126/60     Weight: 118.8 kg (261 lb 14.5 oz)  Body mass index is 38.68 kg/m².    Physical Exam  Constitutional:       General: He is not in acute distress.     Appearance: He is not ill-appearing.   HENT:      Head: Normocephalic and atraumatic.      Right Ear: External ear normal.      Left Ear: External ear normal.      Mouth/Throat:      Pharynx: No oropharyngeal exudate.   Eyes:      General: No scleral icterus.        Right eye: No discharge.         Left eye: No discharge.      Conjunctiva/sclera: Conjunctivae normal.   Neck:      Musculoskeletal: Normal range of motion and neck supple.      Thyroid: No thyromegaly.      Vascular: No JVD.   Cardiovascular:      Rate and Rhythm: Normal rate and regular rhythm.      Heart sounds: No gallop.    Pulmonary:      Effort: Pulmonary effort is normal.      Breath sounds: Normal breath sounds. No wheezing.   Abdominal:      General: Bowel sounds are normal. There is no distension.      Palpations: Abdomen is soft. There is no mass.      Tenderness: There is no abdominal tenderness.   Musculoskeletal:         General: No  deformity.   Lymphadenopathy:      Cervical: No cervical adenopathy.   Skin:     General: Skin is warm and dry.      Capillary Refill: Capillary refill takes less than 2 seconds.      Findings: No rash.   Neurological:      Mental Status: He is alert and oriented to person, place, and time.   Psychiatric:         Behavior: Behavior normal.             Significant Labs:   BMP:   Recent Labs   Lab 11/17/20  0713 11/17/20  1403   *  --      --    K 6.0* 5.0   *  --    CO2 22*  --    BUN 36*  --    CREATININE 1.7*  --    CALCIUM 9.1  --      CBC:   Recent Labs   Lab 11/17/20  0713 11/17/20  1245 11/17/20  1750   WBC 6.72 6.22 6.91   HGB 9.9* 9.5* 9.8*   HCT 30.7* 29.6* 30.5*    195 206       Significant Imaging: none

## 2020-11-18 NOTE — ASSESSMENT & PLAN NOTE
Creatine stable for now. BMP reviewed- noted Estimated Creatinine Clearance: 49.2 mL/min (A) (based on SCr of 1.7 mg/dL (H)). according to latest data. Monitor UOP and serial BMP and adjust therapy as needed. Renally dose meds.

## 2020-11-18 NOTE — H&P
[facility] Medicine  History & Physical    Patient Name: Richard Bustos  MRN: [id number]  Admission Date: 11/17/2020  Attending Physician: Anshul Mccarthy MD   Primary Care Provider: Familia Ramirez Jr, MD         Patient information was obtained from patient, past medical records and ER records.     Subjective:     Principal Problem:GI bleed    Chief Complaint:   Chief Complaint   Patient presents with    Rectal Bleeding     for a few days        HPI: He presented to ED today complaining of bleeding from GI tract.  Has had dark brown diarrhea and then blood.  Started three days ago.  Associated with lightheadedness.  Not associated with abdominal pain.  Not associated with vomiting.  No chest pain.  No unusual shortness of breath.  He is on chronic warfarin for stroke prevention.  He has paroxysmal afib, CAD, HTN, and DM.  Has been in his usual state of health otherwise.    Past Medical History:   Diagnosis Date    Anemia     Anemia of other chronic disease 9/13/2017    Anemia, chronic renal failure 9/13/2017    Anemia, unspecified 9/13/2017    Anticoagulant long-term use     Aorta aneurysm     Arthritis     Atrial fibrillation     CAD (coronary artery disease)     CHF (congestive heart failure)     Chronic kidney disease     Clotting disorder 9/13/2017    Colon polyp     Dementia     Diabetes mellitus     not on meds    Diabetes mellitus type II     Diverticulosis     Elevated PSA     Encounter for blood transfusion     Former smoker     General anesthetics causing adverse effect in therapeutic use     TROUBLE COMING OUT OF ANESTHESIA WITH GASTRIC BYPASS    GERD (gastroesophageal reflux disease)     Gout     Hx of colonic polyps     Hypercoagulable state     Hyperlipidemia     Hypertension     MI, old 2010    MTHFR mutation     Myocardial infarction     9/2010    Osteomyelitis of ankle or foot 3/9/2017    Prostate cancer 06/2016    Sleep apnea      Squamous cell carcinoma 01/23/2018    Left helix, imiquimod    Venous stasis     Chronic       Past Surgical History:   Procedure Laterality Date    ABDOMINAL SURGERY      CARDIAC SURGERY      3 STENTS     CAUDAL EPIDURAL STEROID INJECTION N/A 6/22/2020    Procedure: INJECTION, STEROID, SPINE, EPIDURAL, CAUDAL;  Surgeon: Evangelist Bose MD;  Location: [facility] OR;  Service: Pain Management;  Laterality: N/A;    COLONOSCOPY  02/2011    diverticulosis with diverticula with clot, no active bleeding    COLONOSCOPY N/A 8/24/2016    Procedure: COLONOSCOPY;  Surgeon: Saroj Borjas MD;  Location: [facility];  Service: Endoscopy;  Laterality: N/A;    EPIDURAL STEROID INJECTION INTO LUMBAR SPINE N/A 6/22/2020    Procedure: Injection-steroid-epidural-lumbar;  Surgeon: Evangelist Bose MD;  Location: [facility] OR;  Service: Pain Management;  Laterality: N/A;  L3 L4 L5 S1    EPIDURAL STEROID INJECTION INTO LUMBAR SPINE N/A 9/21/2020    Procedure: Injection-steroid-epidural-lumbar;  Surgeon: Evangelist Bose MD;  Location: [facility] OR;  Service: Pain Management;  Laterality: N/A;  L5-S1    GASTRIC BYPASS  2/5/2008    IVC FILTER RETRIEVAL      JOINT REPLACEMENT  1996 and 2001    bi-lat hip replacement/Rt Hip and Lt Hip    RADIOFREQUENCY ABLATION OF LUMBAR MEDIAL BRANCH NERVE AT SINGLE LEVEL Bilateral 1/10/2020    Procedure: Radiofrequency Ablation, Nerve, Spinal, Lumbar, Medial Branch, 1 Level;  Surgeon: Evangelist Bose MD;  Location: [facility];  Service: Pain Management;  Laterality: Bilateral;  L3,L4,L5    ROTATOR CUFF REPAIR      Rt shoulder    Stents  8/18/2010    x 3    UPPER GASTROINTESTINAL ENDOSCOPY  02/2011       Review of patient's allergies indicates:   Allergen Reactions    Donepezil Other (See Comments)     AMS    Bactroban [mupirocin calcium] Blisters     Causes Blisters    Shellfish containing products Other (See Comments)     Other reaction(s): Gout  OYSTERS       Current Facility-Administered Medications  on File Prior to Encounter   Medication    lactated ringers infusion     Current Outpatient Medications on File Prior to Encounter   Medication Sig    allopurinoL (ZYLOPRIM) 100 MG tablet TAKE 1 TABLET(100 MG) BY MOUTH EVERY DAY (Patient taking differently: every evening. )    aspirin (ECOTRIN) 81 MG EC tablet Take 81 mg by mouth once daily.    atorvastatin (LIPITOR) 80 MG tablet TAKE 1 TABLET(80 MG) BY MOUTH EVERY DAY (Patient taking differently: every evening. )    calcitRIOL (ROCALTROL) 0.5 MCG Cap 0.75 mcg once daily.     carvediloL (COREG) 25 MG tablet TAKE 1 TABLET(25 MG) BY MOUTH TWICE DAILY WITH MEALS    clopidogreL (PLAVIX) 75 mg tablet Take 1 tablet (75 mg total) by mouth once daily.    famotidine (PEPCID) 40 MG tablet Take 1 tablet (40 mg total) by mouth once daily.    ferrous sulfate (FEOSOL) 325 mg (65 mg iron) Tab tablet Take 1 tablet (325 mg total) by mouth 2 (two) times daily.    fluticasone propionate (FLONASE) 50 mcg/actuation nasal spray SHAKE LIQUID AND USE 2 SPRAYS(100 MCG) IN EACH NOSTRIL EVERY DAY    FOLIC ACID/MULTIVIT-MIN/LUTEIN (CENTRUM SILVER ORAL) Take 1 tablet by mouth once daily.    HYDROcodone-acetaminophen (NORCO) 5-325 mg per tablet Take 1 tablet by mouth every 8 (eight) hours as needed for Pain.    lisinopriL (PRINIVIL,ZESTRIL) 40 MG tablet Take 1 tablet (40 mg total) by mouth every evening.    magnesium oxide (MAG-OX) 400 mg (241.3 mg magnesium) tablet TAKE 1 TABLET BY MOUTH EVERY DAY    mecobal-levomefolat Ca-B6 phos (L-METHYL-B6-B12) 3-35-2 mg Tab Take 1 tablet by mouth 2 (two) times daily.    mirabegron (MYRBETRIQ) 50 mg Tb24 Take 1 tablet (50 mg total) by mouth once daily.    omeprazole (PRILOSEC) 20 MG capsule TAKE 1 CAPSULE BY MOUTH DAILY    vit A/vit C/vit E/zinc/copper (PRESERVISION AREDS ORAL) Take by mouth 2 (two) times a day.     HYDROcodone-acetaminophen (NORCO) 5-325 mg per tablet Take 1 tablet by mouth every 8 (eight) hours as needed for Pain.     nitroGLYCERIN 0.4 MG/DOSE TL SPRY (NITROLINGUAL) 400 mcg/spray spray PLACE 1 SPRAY UNDER THE TONGUE EVERY 5 MINUTES AS NEEDED FOR CHEST PAIN    warfarin (COUMADIN) 5 MG tablet Current dose: Take 5mg daily or as directed by coumadin clinic (Patient taking differently: 5 mg except on wednesday and saturday  Wed and sat 2.5 mgs)    [DISCONTINUED] ammonium lactate 12 % Crea Apply twice daily to affected parts both feet as needed.    [DISCONTINUED] calcium carbonate (TUMS ULTRA) 400 mg calcium (1,000 mg) Chew Take 1 tablet (400 mg total) by mouth once daily.    [DISCONTINUED] ciclopirox (PENLAC) 8 % Soln Apply topically nightly.    [DISCONTINUED] ergocalciferol (ERGOCALCIFEROL) 50,000 unit Cap ergocalciferol (vitamin D2) 1,250 mcg (50,000 unit) capsule   TK 1 C PO Q WK    [DISCONTINUED] furosemide (LASIX) 40 MG tablet Take 1 tablet (40 mg total) by mouth daily as needed.    [DISCONTINUED] HYDROcodone-acetaminophen (NORCO) 5-325 mg per tablet Take 1 tablet by mouth every 8 (eight) hours as needed for Pain. (Patient not taking: Reported on 11/16/2020)    [DISCONTINUED] HYDROcodone-acetaminophen (NORCO) 5-325 mg per tablet Take 1 tablet by mouth every 8 (eight) hours as needed for Pain. (Patient not taking: Reported on 11/16/2020)    [DISCONTINUED] HYDROcodone-acetaminophen (NORCO) 5-325 mg per tablet Take 1 tablet by mouth every 8 (eight) hours as needed for Pain. (Patient not taking: Reported on 11/16/2020)    [DISCONTINUED] hydrocortisone 2.5 % cream Apply topically 2 (two) times daily. for 10 days    [DISCONTINUED] LORazepam (ATIVAN) 2 MG Tab Take 1 tablet (2 mg total) by mouth as needed (pre-MRI anxiety).    [DISCONTINUED] traMADoL (ULTRAM) 50 mg tablet Take 1 tablet (50 mg total) by mouth every 8 (eight) hours as needed for Pain.    [DISCONTINUED] traZODone (DESYREL) 50 MG tablet Take 1 tablet (50 mg total) by mouth every evening.    [DISCONTINUED] vit C-vit E-lutein-min-om-3 (OCUVITE) 199-09-6-150  mg-unit-mg-mg Cap Take 1 capsule by mouth once daily.     Family History     Problem Relation (Age of Onset)    Heart attack Mother, Father, Brother    Lupus Daughter    Ulcerative colitis Daughter (35)        Tobacco Use    Smoking status: Former Smoker    Smokeless tobacco: Never Used    Tobacco comment: 45 yrs ago, only smoked 3-4 packs in his entire life as a teenager   Substance and Sexual Activity    Alcohol use: Not Currently     Comment: rare    Drug use: No    Sexual activity: Not Currently     Review of Systems   Constitutional: Negative for chills, fatigue and fever.   HENT: Negative for congestion and sinus pressure.    Eyes: Negative for pain and visual disturbance.   Respiratory: Negative for cough, shortness of breath and wheezing.    Cardiovascular: Negative for chest pain, palpitations and leg swelling.   Gastrointestinal: Positive for blood in stool and diarrhea. Negative for abdominal pain, nausea and vomiting.   Genitourinary: Negative for difficulty urinating, dysuria and hematuria.   Musculoskeletal: Negative for arthralgias and myalgias.   Skin: Negative for rash and wound.   Neurological: Positive for light-headedness. Negative for dizziness, weakness and headaches.   Hematological: Negative for adenopathy. Does not bruise/bleed easily.   Psychiatric/Behavioral: Negative for confusion and dysphoric mood. The patient is not nervous/anxious.      Objective:     Vital Signs (Most Recent):  Temp: 96.2 °F (35.7 °C) (11/17/20 1938)  Pulse: (!) 54 (11/17/20 1938)  Resp: 17 (11/17/20 1938)  BP: 126/60 (11/17/20 1938)  SpO2: 97 % (11/17/20 1938) Vital Signs (24h Range):  Temp:  [96.2 °F (35.7 °C)-97.9 °F (36.6 °C)] 96.2 °F (35.7 °C)  Pulse:  [54-64] 54  Resp:  [16-18] 17  SpO2:  [95 %-99 %] 97 %  BP: ()/(52-83) 126/60     Weight: 118.8 kg (261 lb 14.5 oz)  Body mass index is 38.68 kg/m².    Physical Exam  Constitutional:       General: He is not in acute distress.     Appearance: He is not  ill-appearing.   HENT:      Head: Normocephalic and atraumatic.      Right Ear: External ear normal.      Left Ear: External ear normal.      Mouth/Throat:      Pharynx: No oropharyngeal exudate.   Eyes:      General: No scleral icterus.        Right eye: No discharge.         Left eye: No discharge.      Conjunctiva/sclera: Conjunctivae normal.   Neck:      Musculoskeletal: Normal range of motion and neck supple.      Thyroid: No thyromegaly.      Vascular: No JVD.   Cardiovascular:      Rate and Rhythm: Normal rate and regular rhythm.      Heart sounds: No gallop.    Pulmonary:      Effort: Pulmonary effort is normal.      Breath sounds: Normal breath sounds. No wheezing.   Abdominal:      General: Bowel sounds are normal. There is no distension.      Palpations: Abdomen is soft. There is no mass.      Tenderness: There is no abdominal tenderness.   Musculoskeletal:         General: No deformity.   Lymphadenopathy:      Cervical: No cervical adenopathy.   Skin:     General: Skin is warm and dry.      Capillary Refill: Capillary refill takes less than 2 seconds.      Findings: No rash.   Neurological:      Mental Status: He is alert and oriented to person, place, and time.   Psychiatric:         Behavior: Behavior normal.             Significant Labs:   BMP:   Recent Labs   Lab 11/17/20  0713 11/17/20  1403   *  --      --    K 6.0* 5.0   *  --    CO2 22*  --    BUN 36*  --    CREATININE 1.7*  --    CALCIUM 9.1  --      CBC:   Recent Labs   Lab 11/17/20  0713 11/17/20  1245 11/17/20  1750   WBC 6.72 6.22 6.91   HGB 9.9* 9.5* 9.8*   HCT 30.7* 29.6* 30.5*    195 206       Significant Imaging: none    Assessment/Plan:     * GI bleed  PPI twice a day.  Consult with gastroenterology.  Hold warfarin and reverse its effect.  Clear liquids today.  NPO tomorrow for endoscopic exam.  No NSAID's.      Warfarin anticoagulation  Hold warfarin.  Vitamin K give orally to reverse the warfarin  effect.  Resume warfarin once bleeding is taken care of.      Hyperkalemia  Likely due to being on ACE inhibitor.  Given Lokelma while in ED.  Repeat level is lower.  Get another level tomorrow.      Anemia due to stage 3 chronic kidney disease  Patient's anemia is currently controlled. Has not received any PRBCs to date.. Etiology likely d/t CKD  Current CBC reviewed-   Lab Results   Component Value Date    HGB 9.8 (L) 11/17/2020    HCT 30.5 (L) 11/17/2020     Monitor serial CBC and transfuse if patient becomes hemodynamically unstable, symptomatic or H/H drops below 7/21.         Paroxysmal atrial fibrillation  No issues presently.  Continue beta blocker.  Keep on cardiac monitor.      CKD (chronic kidney disease) stage 3, GFR 30-59 ml/min, 41  Creatine stable for now. BMP reviewed- noted Estimated Creatinine Clearance: 49.2 mL/min (A) (based on SCr of 1.7 mg/dL (H)). according to latest data. Monitor UOP and serial BMP and adjust therapy as needed. Renally dose meds.      Hypertension associated with diabetes  Chronic, controlled.  Latest blood pressure and vitals reviewed-   Temp:  [96.2 °F (35.7 °C)-97.9 °F (36.6 °C)]   Pulse:  [54-64]   Resp:  [16-18]   BP: ()/(52-83)   SpO2:  [95 %-99 %] .   Home meds for hypertension were reviewed and noted below. Hospital anti-hypertensive changes were made as shown below.  Hypertension Medications at home            carvediloL (COREG) 25 MG tablet TAKE 1 TABLET(25 MG) BY MOUTH TWICE DAILY WITH MEALS    lisinopriL (PRINIVIL,ZESTRIL) 40 MG tablet Take 1 tablet (40 mg total) by mouth every evening.    nitroGLYCERIN 0.4 MG/DOSE TL SPRY (NITROLINGUAL) 400 mcg/spray spray PLACE 1 SPRAY UNDER THE TONGUE EVERY 5 MINUTES AS NEEDED FOR CHEST PAIN      Hospital Medications             carvediloL tablet 25 mg 25 mg, Oral, 2 times daily with meals        Will utilize p.r.n. blood pressure medication only if patient's blood pressure greater than  180/110 and he develops symptoms  such as worsening chest pain or shortness of breath.        Diabetes mellitus with chronic kidney disease, stage III  Patient's FSGs are controlled on current hypoglycemics.   Last A1c reviewed-   Lab Results   Component Value Date    LABA1C 6.6 (H) 08/08/2016    HGBA1C 6.0 (H) 05/20/2020     Most recent fingerstick glucose reviewed-   Recent Labs   Lab 11/17/20  1135 11/17/20  1657   POCTGLUCOSE 143* 123*     Current correctional scale  Low  Maintain anti-hyperglycemic dose as follows-   Antihyperglycemics (From admission, onward)    Start     Stop Route Frequency Ordered    11/17/20 1059  insulin aspart U-100 pen 0-5 Units      -- SubQ Before meals & nightly PRN 11/17/20 1059        Hold Oral hypoglycemics while patient is in the hospital.          VTE Risk Mitigation (From admission, onward)         Ordered     IP VTE HIGH RISK PATIENT  Once      11/17/20 1059     Place sequential compression device  Until discontinued      11/17/20 1059     Reason for No Pharmacological VTE Prophylaxis  Once     Question Answer Comment   Reasons: Active Bleeding    Reasons: Already adequately anticoagulated on oral Anticoagulants        11/17/20 1059                   Anshul Mccarthy MD  Department of Hospital Medicine   [facility]

## 2020-11-18 NOTE — ASSESSMENT & PLAN NOTE
PPI twice a day.  Consult with gastroenterology.  Hold warfarin and reverse its effect.  Clear liquids today.  NPO tomorrow for endoscopic exam.  No NSAID's.

## 2020-11-18 NOTE — ASSESSMENT & PLAN NOTE
Hold warfarin.  Vitamin K give orally to reverse the warfarin effect.  Resume warfarin once bleeding is taken care of.

## 2020-11-18 NOTE — ANESTHESIA POSTPROCEDURE EVALUATION
Anesthesia Post Evaluation    Patient: Richard Bustos    Procedure(s) Performed: Procedure(s) (LRB):  COLONOSCOPY (N/A)    Final Anesthesia Type: general    Patient location during evaluation: PACU  Patient participation: Yes- Able to Participate  Level of consciousness: awake and alert  Post-procedure vital signs: reviewed and stable  Pain management: adequate  Airway patency: patent    PONV status at discharge: No PONV  Anesthetic complications: no      Cardiovascular status: hemodynamically stable  Respiratory status: unassisted and room air  Hydration status: euvolemic  Follow-up not needed.          Vitals Value Taken Time   /63 11/18/20 1350   Temp 37.2 °C (99 °F) 11/18/20 1330   Pulse 72 11/18/20 1350   Resp 17 11/18/20 1350   SpO2 96 % 11/18/20 1350         No case tracking events are documented in the log.      Pain/Chris Score: Pain Rating Prior to Med Admin: 6 (11/18/2020 10:09 AM)  Pain Rating Post Med Admin: 0 (11/18/2020  2:00 AM)  Chris Score: 10 (11/18/2020  1:50 PM)

## 2020-11-18 NOTE — ASSESSMENT & PLAN NOTE
Likely due to being on ACE inhibitor.  Given Lokelma while in ED.  Repeat level is lower.  Get another level tomorrow.

## 2020-11-18 NOTE — ASSESSMENT & PLAN NOTE
Patient's FSGs are controlled on current hypoglycemics.   Last A1c reviewed-   Lab Results   Component Value Date    LABA1C 6.6 (H) 08/08/2016    HGBA1C 6.0 (H) 05/20/2020     Most recent fingerstick glucose reviewed-   Recent Labs   Lab 11/17/20  1135 11/17/20  1657   POCTGLUCOSE 143* 123*     Current correctional scale  Low  Maintain anti-hyperglycemic dose as follows-   Antihyperglycemics (From admission, onward)    Start     Stop Route Frequency Ordered    11/17/20 1059  insulin aspart U-100 pen 0-5 Units      -- SubQ Before meals & nightly PRN 11/17/20 1059        Hold Oral hypoglycemics while patient is in the hospital.

## 2020-11-19 ENCOUNTER — TELEPHONE (OUTPATIENT)
Dept: MEDSURG UNIT | Facility: HOSPITAL | Age: 75
End: 2020-11-19

## 2020-11-19 ENCOUNTER — PATIENT OUTREACH (OUTPATIENT)
Dept: ADMINISTRATIVE | Facility: CLINIC | Age: 75
End: 2020-11-19

## 2020-11-19 NOTE — HOSPITAL COURSE
He was admitted with lower GI bleed.  He was given phytonadione to reverse the warfarin effect.  The bleeding stopped.  He did not need blood products.  Underwent colonoscopy, which showed angiodysplasia in the colon.  These were treated with APC.  Patient was discharged later the same day.  Instructed to resume warfarin as before.    Physical Exam  Constitutional:       General: He is not in acute distress.     Appearance: He is not ill-appearing.   HENT:      Head: Normocephalic and atraumatic.      Right Ear: External ear normal.      Left Ear: External ear normal.      Mouth/Throat:      Pharynx: No oropharyngeal exudate.   Eyes:      General: No scleral icterus.        Right eye: No discharge.         Left eye: No discharge.      Conjunctiva/sclera: Conjunctivae normal.   Neck:      Musculoskeletal: Normal range of motion and neck supple.      Thyroid: No thyromegaly.      Vascular: No JVD.   Cardiovascular:      Rate and Rhythm: Normal rate and regular rhythm.      Heart sounds: No gallop.    Pulmonary:      Effort: Pulmonary effort is normal.      Breath sounds: Normal breath sounds. No wheezing.   Abdominal:      General: Bowel sounds are normal. There is no distension.      Palpations: Abdomen is soft. There is no mass.      Tenderness: There is no abdominal tenderness.

## 2020-11-19 NOTE — DISCHARGE SUMMARY
[facility] Hospital Medicine  Discharge Summary      Patient Name: Richard Bustos  MRN: [id number]  Admission Date: 11/17/2020  Hospital Length of Stay: 1 days  Discharge Date and Time: 11/18/2020  6:16 PM  Attending Physician: No att. providers found   Discharging Provider: Anshul Mccarthy MD  Primary Care Provider: Familia Ramirez Jr, MD      HPI:   He presented to ED today complaining of bleeding from GI tract.  Has had dark brown diarrhea and then blood.  Started three days ago.  Associated with lightheadedness.  Not associated with abdominal pain.  Not associated with vomiting.  No chest pain.  No unusual shortness of breath.  He is on chronic warfarin for stroke prevention.  He has paroxysmal afib, CAD, HTN, and DM.  Has been in his usual state of health otherwise.    Procedure(s) (LRB):  COLONOSCOPY (N/A)      Hospital Course:   He was admitted with lower GI bleed.  He was given phytonadione to reverse the warfarin effect.  The bleeding stopped.  He did not need blood products.  Underwent colonoscopy, which showed angiodysplasia in the colon.  These were treated with APC.  Patient was discharged later the same day.  Instructed to resume warfarin as before.    Physical Exam  Constitutional:       General: He is not in acute distress.     Appearance: He is not ill-appearing.   HENT:      Head: Normocephalic and atraumatic.      Right Ear: External ear normal.      Left Ear: External ear normal.      Mouth/Throat:      Pharynx: No oropharyngeal exudate.   Eyes:      General: No scleral icterus.        Right eye: No discharge.         Left eye: No discharge.      Conjunctiva/sclera: Conjunctivae normal.   Neck:      Musculoskeletal: Normal range of motion and neck supple.      Thyroid: No thyromegaly.      Vascular: No JVD.   Cardiovascular:      Rate and Rhythm: Normal rate and regular rhythm.      Heart sounds: No gallop.    Pulmonary:      Effort: Pulmonary effort is normal.      Breath  sounds: Normal breath sounds. No wheezing.   Abdominal:      General: Bowel sounds are normal. There is no distension.      Palpations: Abdomen is soft. There is no mass.      Tenderness: There is no abdominal tenderness.      Consults:   Consults (From admission, onward)        Status Ordering Provider     Inpatient consult to Gastroenterology  Once     Provider:  Saroj Borjas MD    Completed DIVINE MALCOLM          No new Assessment & Plan notes have been filed under this hospital service since the last note was generated.  Service: Hospital Medicine    Final Active Diagnoses:    Diagnosis Date Noted POA    Warfarin anticoagulation [Z79.01] 11/17/2020 Not Applicable    Anemia due to stage 3 chronic kidney disease [N18.30, D63.1] 09/13/2017 Yes    Paroxysmal atrial fibrillation [I48.0] 11/21/2014 Yes    CKD (chronic kidney disease) stage 3, GFR 30-59 ml/min, 41 [N18.30] 01/23/2013 Yes    Diabetes mellitus with chronic kidney disease, stage III [E11.22] 12/16/2011 Yes     Chronic    Hypertension associated with diabetes [E11.59, I10] 12/16/2011 Yes      Problems Resolved During this Admission:    Diagnosis Date Noted Date Resolved POA    PRINCIPAL PROBLEM:  Rectal bleeding [K62.5] 11/18/2020 11/18/2020 Yes    Hyperkalemia [E87.5] 11/17/2020 11/18/2020 Yes       Discharged Condition: good    Disposition: Home or Self Care    Follow Up:  Follow-up Information     TORI Phelps On 12/2/2020.    Specialty: Internal Medicine  Why: hosp f/u appt in avs  Contact information:  [address] [id number] [phone]             Saroj Borjas MD In 2 weeks.    Specialty: Gastroenterology  Why:  spoke with Maximiliano, he sent a message to the nurse to schedule  Contact information:  [address]  [address]  [place] [id number] [phone]                 Patient Instructions:      Diet Adult Regular     Activity as tolerated       Significant Diagnostic Studies:   Lab Results   Component  Value Date    WBC 6.29 11/18/2020    HGB 9.2 (L) 11/18/2020    HCT 29.1 (L) 11/18/2020     (H) 11/18/2020     11/18/2020       Hemoglobin   Date Value Ref Range Status   11/18/2020 9.2 (L) 14.0 - 18.0 g/dL Final   11/18/2020 8.7 (L) 14.0 - 18.0 g/dL Final   11/17/2020 9.8 (L) 14.0 - 18.0 g/dL Final     BMP  Lab Results   Component Value Date     11/18/2020    K 4.5 11/18/2020     (H) 11/18/2020    CO2 23 11/18/2020    BUN 25 (H) 11/18/2020    CREATININE 1.4 11/18/2020    CALCIUM 8.6 (L) 11/18/2020    ANIONGAP 5 (L) 11/18/2020    ESTGFRAFRICA 57 (A) 11/18/2020    EGFRNONAA 49 (A) 11/18/2020         Pending Diagnostic Studies:     Procedure Component Value Units Date/Time    EKG 12-lead [[id number]] Collected: 11/17/20 0853    Order Status: Sent Lab Status: In process Updated: 11/17/20 1720    Narrative:      Test Reason : E87.5,    Vent. Rate : 057 BPM     Atrial Rate : 057 BPM     P-R Int : 156 ms          QRS Dur : 140 ms      QT Int : 464 ms       P-R-T Axes : 066 003 -13 degrees     QTc Int : 451 ms    Sinus bradycardia with Premature atrial complexes with Aberrant conduction  Right bundle branch block  Abnormal ECG  When compared with ECG of 22-JUL-2020 14:08,  Previous ECG has undetermined rhythm, needs review  Nonspecific T wave abnormality now evident in Anterior leads    Referred By: [email or web address]   SELF           Confirmed By:          Medications:  Reconciled Home Medications:      Medication List      CHANGE how you take these medications    allopurinoL 100 MG tablet  Commonly known as: ZYLOPRIM  TAKE 1 TABLET(100 MG) BY MOUTH EVERY DAY  What changed:   · how much to take  · how to take this  · when to take this     atorvastatin 80 MG tablet  Commonly known as: LIPITOR  TAKE 1 TABLET(80 MG) BY MOUTH EVERY DAY  What changed:   · how much to take  · how to take this  · when to take this     warfarin 5 MG tablet  Commonly known as: COUMADIN  Current dose: Take 5mg daily or as directed by  coumadin clinic  What changed: additional instructions        CONTINUE taking these medications    aspirin 81 MG EC tablet  Commonly known as: ECOTRIN  Take 81 mg by mouth once daily.     calcitRIOL 0.5 MCG Cap  Commonly known as: ROCALTROL  0.75 mcg once daily.     carvediloL 25 MG tablet  Commonly known as: COREG  TAKE 1 TABLET(25 MG) BY MOUTH TWICE DAILY WITH MEALS     CENTRUM SILVER ORAL  Take 1 tablet by mouth once daily.     clopidogreL 75 mg tablet  Commonly known as: PLAVIX  Take 1 tablet (75 mg total) by mouth once daily.     famotidine 40 MG tablet  Commonly known as: PEPCID  Take 1 tablet (40 mg total) by mouth once daily.     ferrous sulfate 325 mg (65 mg iron) Tab tablet  Commonly known as: FEOSOL  Take 1 tablet (325 mg total) by mouth 2 (two) times daily.     fluticasone propionate 50 mcg/actuation nasal spray  Commonly known as: FLONASE  SHAKE LIQUID AND USE 2 SPRAYS(100 MCG) IN EACH NOSTRIL EVERY DAY     * HYDROcodone-acetaminophen 5-325 mg per tablet  Commonly known as: NORCO  Take 1 tablet by mouth every 8 (eight) hours as needed for Pain.     * HYDROcodone-acetaminophen 5-325 mg per tablet  Commonly known as: NORCO  Take 1 tablet by mouth every 8 (eight) hours as needed for Pain.     lisinopriL 40 MG tablet  Commonly known as: PRINIVIL,ZESTRIL  Take 1 tablet (40 mg total) by mouth every evening.     magnesium oxide 400 mg (241.3 mg magnesium) tablet  Commonly known as: MAG-OX  TAKE 1 TABLET BY MOUTH EVERY DAY     mecobal-levomefolat Ca-B6 phos 3-35-2 mg Tab  Commonly known as: L-METHYL-B6-B12  Take 1 tablet by mouth 2 (two) times daily.     MYRBETRIQ 50 mg Tb24  Generic drug: mirabegron  Take 1 tablet (50 mg total) by mouth once daily.     nitroGLYCERIN 0.4 MG/DOSE TL SPRY 400 mcg/spray spray  Commonly known as: NITROLINGUAL  PLACE 1 SPRAY UNDER THE TONGUE EVERY 5 MINUTES AS NEEDED FOR CHEST PAIN     omeprazole 20 MG capsule  Commonly known as: PRILOSEC  TAKE 1 CAPSULE BY MOUTH DAILY      PRESERVISION AREDS ORAL  Take by mouth 2 (two) times a day.         * This list has 2 medication(s) that are the same as other medications prescribed for you. Read the directions carefully, and ask your doctor or other care provider to review them with you.            STOP taking these medications    ammonium lactate 12 % Crea     calcium carbonate 400 mg calcium (1,000 mg) Chew  Commonly known as: TUMS ULTRA     ciclopirox 8 % Soln  Commonly known as: PENLAC     ergocalciferol 50,000 unit Cap  Commonly known as: ERGOCALCIFEROL     furosemide 40 MG tablet  Commonly known as: LASIX     hydrocortisone 2.5 % cream     LORazepam 2 MG Tab  Commonly known as: ATIVAN     OCUVITE 119-11-7-150 mg-unit-mg-mg Cap  Generic drug: vit C-vit E-lutein-min-om-3     traMADoL 50 mg tablet  Commonly known as: ULTRAM     traZODone 50 MG tablet  Commonly known as: DESYREL            Indwelling Lines/Drains at time of discharge:   Lines/Drains/Airways     None                 Time spent on the discharge of patient: 21 minutes  Patient was seen and examined on the date of discharge and determined to be suitable for discharge.         Anshul Mccarthy MD  Department of Hospital Medicine  [facility]

## 2020-11-19 NOTE — PATIENT INSTRUCTIONS
When You Have Gastrointestinal (GI) Bleeding    Blood in your vomit or stool can be a sign of gastrointestinal (GI) bleeding. GI bleeding can be scary. But the cause may not be serious. You should always see a doctor if GI bleeding occurs.  The GI tract  The GI tract is the path through which food travels in the body. Food passes from the mouth down the esophagus (the tube from the mouth to the stomach). Food begins to break down in the stomach. It then moves through the duodenum, the first part of the small intestine. Nutrients are absorbed as food travels through the small intestine. What is left passes into the colon (large intestine) as waste. The colon removes water from the waste. Waste continues from the colon to the rectum (where stool is stored). Waste then leaves the body through the anus.  Causes of GI bleeding  GI bleeding can be caused by many different problems. Some of the more common causes include:  · Swollen veins in the anus (hemorrhoids)  · Swollen veins in the esophagus (varices)  · Sore on the lining of the GI tract (ulcer)  · Cuts or scrapes in the mouth or throat  · Infection caused by germs such as bacteria or parasites  · Food allergies, such as milk allergy in young children  · Medicines  · Inflammation of the GI tract (gastritis or esophagitis)  · Colitis (Crohn's disease or ulcerative colitis)  · Cancer (tumors or polyps)  · Abnormal pouches in the colon (diverticula)  · Tears in the esophagus or anus  · Nosebleed  · Abnormal blood vessels in the GI tract (angiodysplasia)  Diagnosing the cause of blood in stool  If blood is coming out in your stool, you may have a lower GI tract problem or a very fast upper GI tract bleed. Bleeding from the GI tract can be bright red. Or it may look dark and tarry. Tests may also find blood in your stool that cant be seen with the eye (occult blood). To find out the cause, tests that may be ordered include:  · Blood tests. A blood sample is taken and  sent to a lab for exam.  · Hemoccult test. Checks a stool sample for blood.  · Stool culture. Checks a stool sample for bacteria or parasites.  · X-ray, ultrasound, or CT scan. Imaging tests that take pictures of the digestive tract.  · Colonoscopy or sigmoidoscopy. This test uses a flexible tube with a tiny camera. The tube is inserted through your anus into your rectum to see the inside of your colon. Your provider can also take a tiny tissue sample (biopsy) and treat a bleeding source  Diagnosing the cause of blood in vomit  If you are vomiting blood or something that looks like coffee grounds, you may have an upper GI tract problem. To find the cause, tests that may be done include:  · Upper Endoscopy. A flexible tube with a tiny camera is inserted through your mouth and throat to see inside your upper GI tract. This lets your provider take a tiny tissue sample (biopsy) and treat a bleeding source.  · Nasogastric lavage. This can tell if you have upper GI or lower GI bleeding.  · X-ray, ultrasound, or CT scan. Imaging tests that take pictures of your digestive tract.  · Upper GI series. X-rays of the upper part of your GI tract taken from inside your body.  · Enteroscopy. This sends a flexible tube or a small, swallowed capsule camera into your small intestine.  When to call your healthcare provider  Call your healthcare provider right away if you have any of the following:  · Bleeding from your mouth or anus that can't be stopped  · Fever of 100.4°F (38.0°) or higher  · Bleeding along with feeling lightheaded or dizzy  · Signs of fluid loss (dehydration). These include a dry, sticky mouth, decreased urine output; and very dark urine.  · Belly (abdominal) pain   Date Last Reviewed: 7/1/2016  © 4023-7966 [employer]. [address] [place] [address], [place] [address]. All rights reserved. This information is not intended as a substitute for professional medical care. Always follow your healthcare  professional's instructions.

## 2020-11-19 NOTE — PLAN OF CARE
11/19/20 0804   Final Note   Assessment Type Final Discharge Note   Anticipated Discharge Disposition Home

## 2020-11-20 ENCOUNTER — OFFICE VISIT (OUTPATIENT)
Dept: ORTHOPEDICS | Facility: CLINIC | Age: 75
End: 2020-11-20
Payer: MEDICARE

## 2020-11-20 ENCOUNTER — HOSPITAL ENCOUNTER (OUTPATIENT)
Dept: RADIOLOGY | Facility: HOSPITAL | Age: 75
Discharge: HOME | End: 2020-11-20
Attending: ORTHOPAEDIC SURGERY
Payer: MEDICARE

## 2020-11-20 VITALS — BODY MASS INDEX: 38.66 KG/M2 | RESPIRATION RATE: 16 BRPM | HEIGHT: 69 IN | WEIGHT: 261 LBS

## 2020-11-20 DIAGNOSIS — M25.551 RIGHT HIP PAIN: Primary | ICD-10-CM

## 2020-11-20 DIAGNOSIS — M25.551 RIGHT HIP PAIN: ICD-10-CM

## 2020-11-20 PROCEDURE — 1159F MED LIST DOCD IN RCRD: CPT | Mod: S$GLB,,, | Performed by: ORTHOPAEDIC SURGERY

## 2020-11-20 PROCEDURE — 3288F PR FALLS RISK ASSESSMENT DOCUMENTED: ICD-10-PCS | Mod: CPTII,S$GLB,, | Performed by: ORTHOPAEDIC SURGERY

## 2020-11-20 PROCEDURE — 99999 PR PBB SHADOW E&M-EST. PATIENT-LVL III: CPT | Mod: PBBFAC,,, | Performed by: ORTHOPAEDIC SURGERY

## 2020-11-20 PROCEDURE — 1159F PR MEDICATION LIST DOCUMENTED IN MEDICAL RECORD: ICD-10-PCS | Mod: S$GLB,,, | Performed by: ORTHOPAEDIC SURGERY

## 2020-11-20 PROCEDURE — 73502 X-RAY EXAM HIP UNI 2-3 VIEWS: CPT | Mod: 26,RT,, | Performed by: RADIOLOGY

## 2020-11-20 PROCEDURE — 73502 X-RAY EXAM HIP UNI 2-3 VIEWS: CPT | Mod: TC,PN,RT

## 2020-11-20 PROCEDURE — 1101F PT FALLS ASSESS-DOCD LE1/YR: CPT | Mod: CPTII,S$GLB,, | Performed by: ORTHOPAEDIC SURGERY

## 2020-11-20 PROCEDURE — 1101F PR PT FALLS ASSESS DOC 0-1 FALLS W/OUT INJ PAST YR: ICD-10-PCS | Mod: CPTII,S$GLB,, | Performed by: ORTHOPAEDIC SURGERY

## 2020-11-20 PROCEDURE — 3008F BODY MASS INDEX DOCD: CPT | Mod: CPTII,S$GLB,, | Performed by: ORTHOPAEDIC SURGERY

## 2020-11-20 PROCEDURE — 73502 XR HIP 2 VIEW RIGHT: ICD-10-PCS | Mod: 26,RT,, | Performed by: RADIOLOGY

## 2020-11-20 PROCEDURE — 1125F PR PAIN SEVERITY QUANTIFIED, PAIN PRESENT: ICD-10-PCS | Mod: S$GLB,,, | Performed by: ORTHOPAEDIC SURGERY

## 2020-11-20 PROCEDURE — 1125F AMNT PAIN NOTED PAIN PRSNT: CPT | Mod: S$GLB,,, | Performed by: ORTHOPAEDIC SURGERY

## 2020-11-20 PROCEDURE — 3008F PR BODY MASS INDEX (BMI) DOCUMENTED: ICD-10-PCS | Mod: CPTII,S$GLB,, | Performed by: ORTHOPAEDIC SURGERY

## 2020-11-20 PROCEDURE — 3288F FALL RISK ASSESSMENT DOCD: CPT | Mod: CPTII,S$GLB,, | Performed by: ORTHOPAEDIC SURGERY

## 2020-11-20 PROCEDURE — 99214 OFFICE O/P EST MOD 30 MIN: CPT | Mod: S$GLB,,, | Performed by: ORTHOPAEDIC SURGERY

## 2020-11-20 PROCEDURE — 99999 PR PBB SHADOW E&M-EST. PATIENT-LVL III: ICD-10-PCS | Mod: PBBFAC,,, | Performed by: ORTHOPAEDIC SURGERY

## 2020-11-20 PROCEDURE — 99214 PR OFFICE/OUTPT VISIT, EST, LEVL IV, 30-39 MIN: ICD-10-PCS | Mod: S$GLB,,, | Performed by: ORTHOPAEDIC SURGERY

## 2020-11-20 NOTE — PROGRESS NOTES
Past Medical History:   Diagnosis Date    Anemia     Anemia of other chronic disease 9/13/2017    Anemia, chronic renal failure 9/13/2017    Anemia, unspecified 9/13/2017    Anticoagulant long-term use     Aorta aneurysm     Arthritis     Atrial fibrillation     CAD (coronary artery disease)     CHF (congestive heart failure)     Chronic kidney disease     Clotting disorder 9/13/2017    Colon polyp     Dementia     Diabetes mellitus     not on meds    Diabetes mellitus type II     Diverticulosis     Elevated PSA     Encounter for blood transfusion     Former smoker     General anesthetics causing adverse effect in therapeutic use     TROUBLE COMING OUT OF ANESTHESIA WITH GASTRIC BYPASS    GERD (gastroesophageal reflux disease)     Gout     Hx of colonic polyps     Hypercoagulable state     Hyperlipidemia     Hypertension     MI, old 2010    MTHFR mutation     Myocardial infarction     9/2010    Osteomyelitis of ankle or foot 3/9/2017    Prostate cancer 06/2016    Sleep apnea     Squamous cell carcinoma 01/23/2018    Left helix, imiquimod    Venous stasis     Chronic       Past Surgical History:   Procedure Laterality Date    ABDOMINAL SURGERY      CARDIAC SURGERY      3 STENTS     CAUDAL EPIDURAL STEROID INJECTION N/A 6/22/2020    Procedure: INJECTION, STEROID, SPINE, EPIDURAL, CAUDAL;  Surgeon: Evangelist Bose MD;  Location[facility] OR;  Service: Pain Management;  Laterality: N/A;    COLONOSCOPY  02/2011    diverticulosis with diverticula with clot, no active bleeding    COLONOSCOPY N/A 8/24/2016    Procedure: COLONOSCOPY;  Surgeon: Saroj Borjas MD;  Location: [facility] ENDO;  Service: Endoscopy;  Laterality: N/A;    COLONOSCOPY N/A 11/18/2020    Procedure: COLONOSCOPY;  Surgeon: Saroj Borjas MD;  Location: [facility] ENDO;  Service: Endoscopy;  Laterality: N/A;    EPIDURAL STEROID INJECTION INTO LUMBAR SPINE N/A 6/22/2020    Procedure: Injection-steroid-epidural-lumbar;   Surgeon: Evangelist Bose MD;  Location: [facility] OR;  Service: Pain Management;  Laterality: N/A;  L3 L4 L5 S1    EPIDURAL STEROID INJECTION INTO LUMBAR SPINE N/A 9/21/2020    Procedure: Injection-steroid-epidural-lumbar;  Surgeon: Evangelist Bose MD;  Location: [facility] OR;  Service: Pain Management;  Laterality: N/A;  L5-S1    GASTRIC BYPASS  2/5/2008    IVC FILTER RETRIEVAL      JOINT REPLACEMENT  1996 and 2001    bi-lat hip replacement/Rt Hip and Lt Hip    RADIOFREQUENCY ABLATION OF LUMBAR MEDIAL BRANCH NERVE AT SINGLE LEVEL Bilateral 1/10/2020    Procedure: Radiofrequency Ablation, Nerve, Spinal, Lumbar, Medial Branch, 1 Level;  Surgeon: Evangelist Bose MD;  Location: [facility] OR;  Service: Pain Management;  Laterality: Bilateral;  L3,L4,L5    ROTATOR CUFF REPAIR      Rt shoulder    Stents  8/18/2010    x 3    UPPER GASTROINTESTINAL ENDOSCOPY  02/2011       Current Outpatient Medications   Medication Sig    allopurinoL (ZYLOPRIM) 100 MG tablet TAKE 1 TABLET(100 MG) BY MOUTH EVERY DAY (Patient taking differently: every evening. )    aspirin (ECOTRIN) 81 MG EC tablet Take 81 mg by mouth once daily.    atorvastatin (LIPITOR) 80 MG tablet TAKE 1 TABLET(80 MG) BY MOUTH EVERY DAY (Patient taking differently: every evening. )    calcitRIOL (ROCALTROL) 0.5 MCG Cap 0.75 mcg once daily.     carvediloL (COREG) 25 MG tablet TAKE 1 TABLET(25 MG) BY MOUTH TWICE DAILY WITH MEALS    clopidogreL (PLAVIX) 75 mg tablet Take 1 tablet (75 mg total) by mouth once daily.    famotidine (PEPCID) 40 MG tablet Take 1 tablet (40 mg total) by mouth once daily.    ferrous sulfate (FEOSOL) 325 mg (65 mg iron) Tab tablet Take 1 tablet (325 mg total) by mouth 2 (two) times daily.    fluticasone propionate (FLONASE) 50 mcg/actuation nasal spray SHAKE LIQUID AND USE 2 SPRAYS(100 MCG) IN EACH NOSTRIL EVERY DAY    FOLIC ACID/MULTIVIT-MIN/LUTEIN (CENTRUM SILVER ORAL) Take 1 tablet by mouth once daily.    HYDROcodone-acetaminophen (NORCO)  5-325 mg per tablet Take 1 tablet by mouth every 8 (eight) hours as needed for Pain.    HYDROcodone-acetaminophen (NORCO) 5-325 mg per tablet Take 1 tablet by mouth every 8 (eight) hours as needed for Pain.    lisinopriL (PRINIVIL,ZESTRIL) 40 MG tablet Take 1 tablet (40 mg total) by mouth every evening.    magnesium oxide (MAG-OX) 400 mg (241.3 mg magnesium) tablet TAKE 1 TABLET BY MOUTH EVERY DAY    mecobal-levomefolat Ca-B6 phos (L-METHYL-B6-B12) 3-35-2 mg Tab Take 1 tablet by mouth 2 (two) times daily.    mirabegron (MYRBETRIQ) 50 mg Tb24 Take 1 tablet (50 mg total) by mouth once daily.    nitroGLYCERIN 0.4 MG/DOSE TL SPRY (NITROLINGUAL) 400 mcg/spray spray PLACE 1 SPRAY UNDER THE TONGUE EVERY 5 MINUTES AS NEEDED FOR CHEST PAIN    omeprazole (PRILOSEC) 20 MG capsule TAKE 1 CAPSULE BY MOUTH DAILY    vit A/vit C/vit E/zinc/copper (PRESERVISION AREDS ORAL) Take by mouth 2 (two) times a day.     warfarin (COUMADIN) 5 MG tablet Current dose: Take 5mg daily or as directed by coumadin clinic (Patient taking differently: 5 mg except on wednesday and saturday  Wed and sat 2.5 mgs)     No current facility-administered medications for this visit.      Facility-Administered Medications Ordered in Other Visits   Medication    lactated ringers infusion       Review of patient's allergies indicates:   Allergen Reactions    Donepezil Other (See Comments)     AMS    Bactroban [mupirocin calcium] Blisters     Causes Blisters    Shellfish containing products Other (See Comments)     Other reaction(s): Gout  [facility]STERS       Family History   Problem Relation Age of Onset    Heart attack Mother     Heart attack Father     Heart attack Brother     Ulcerative colitis Daughter 35    Lupus Daughter     Colon cancer Neg Hx     Colon polyps Neg Hx     Crohn's disease Neg Hx     Melanoma Neg Hx     Psoriasis Neg Hx     Eczema Neg Hx        Social History     Socioeconomic History    Marital status:      Spouse  name: Not on file    Number of children: Not on file    Years of education: Not on file    Highest education level: Not on file   Occupational History    Not on file   Social Needs    Financial resource strain: Not on file    Food insecurity     Worry: Not on file     Inability: Not on file    Transportation needs     Medical: Not on file     Non-medical: Not on file   Tobacco Use    Smoking status: Former Smoker    Smokeless tobacco: Never Used    Tobacco comment: 45 yrs ago, only smoked 3-4 packs in his entire life as a teenager   Substance and Sexual Activity    Alcohol use: Not Currently     Comment: rare    Drug use: No    Sexual activity: Not Currently   Lifestyle    Physical activity     Days per week: Not on file     Minutes per session: Not on file    Stress: Not on file   Relationships    Social connections     Talks on phone: Not on file     Gets together: Not on file     Attends Christianity service: Not on file     Active member of club or organization: Not on file     Attends meetings of clubs or organizations: Not on file     Relationship status: Not on file   Other Topics Concern    Not on file   Social History Narrative    Not on file       Chief Complaint:   Chief Complaint   Patient presents with    Hip Pain     right hip pain       Consulting Physician: No ref. provider found    History of present illness:    This is a 75 y.o. year old male who complains of right hip pain that has been bothering him for over a month.  He puts his pain at a 5/10 worse with activities.  He has a history of bilateral hip replacements.  The right hip was done in 1996.  He states he had a fall about 4 weeks ago that may have started the pain in the hip.    Review of Systems:    Constitution:   Denies chills, fever, and sweats.  HENT:   Denies headaches or blurry vision.  Cardiovascular:  Denies chest pain or irregular heart beat.  Respiratory:   Denies cough or shortness of breath.  Gastrointestinal:  " Denies abdominal pain, nausea, or vomiting.  Musculoskeletal:   Denies muscle cramps.  Neurological:   Denies dizziness or focal weakness.  Psychiatric/Behavior: Normal mental status.  Hematology/Lymph:  Denies bleeding problem or easy bruising/bleeding.  Skin:    Denies rash or suspicious lesions.    Examination:    Vital Signs:    Vitals:    11/20/20 0959   Resp: 16   Weight: 118.4 kg (261 lb)   Height: 5' 9" (1.753 m)   PainSc:   5       Body mass index is 38.54 kg/m².    Constitution:   Well-developed, well nourished patient in no acute distress.  Neurological:   Alert and oriented x 3 and cooperative to examination.     Psychiatric/Behavior: Normal mental status.  Respiratory:   No shortness of breath.  Eyes:    Extraoccular muscles intact  Skin:    No scars, rash or suspicious lesions.    Physical Exam: Right Hip Exam    Gait:   Mildly antalgic    Skin  Rash:   None  Scars:   None    Inspection  Erythema:  None  Bruising:  None  Swelling:  None  Masses:  None  Lymphadenopathy: None    Range of Motion  Limited due to pain and hip replacement    Groin pain with hip range of motion.    Straight Leg Raise: Negative  Log Roll:  Negative    Tenderness  Greater Trochanter: Negative    Strength:  4+-5/5    Stability:  Normal    Sensation:  Intact    Vascular  Pulses:  Palpable distally          Imaging: X-rays ordered and images interpreted today personally by me of right hip shows what appears to be some proximal lucency around the prosthesis near the lesser trochanter.         Assessment: Right hip pain        Plan:  He has had this hip for almost 25 years.  There appears to be some bone changes around the prosthesis.  I will send this to my partner for evaluation of revision.      DISCLAIMER: This note may have been dictated using voice recognition software and may contain grammatical errors.     NOTE: Consult report sent to referring provider via [facility] EMR.    "

## 2020-11-23 ENCOUNTER — HOSPITAL ENCOUNTER (OUTPATIENT)
Dept: RADIOLOGY | Facility: HOSPITAL | Age: 75
Discharge: HOME | End: 2020-11-23
Attending: PHYSICIAN ASSISTANT
Payer: MEDICARE

## 2020-11-23 ENCOUNTER — OFFICE VISIT (OUTPATIENT)
Dept: ORTHOPEDICS | Facility: CLINIC | Age: 75
End: 2020-11-23
Payer: MEDICARE

## 2020-11-23 VITALS — HEIGHT: 69 IN | WEIGHT: 261 LBS | RESPIRATION RATE: 16 BRPM | BODY MASS INDEX: 38.66 KG/M2

## 2020-11-23 DIAGNOSIS — R19.03 RIGHT LOWER QUADRANT ABDOMINAL MASS: ICD-10-CM

## 2020-11-23 DIAGNOSIS — M25.551 RIGHT HIP PAIN: Primary | ICD-10-CM

## 2020-11-23 PROCEDURE — 1125F PR PAIN SEVERITY QUANTIFIED, PAIN PRESENT: ICD-10-PCS | Mod: S$GLB,,, | Performed by: ORTHOPAEDIC SURGERY

## 2020-11-23 PROCEDURE — 99214 OFFICE O/P EST MOD 30 MIN: CPT | Mod: S$GLB,,, | Performed by: ORTHOPAEDIC SURGERY

## 2020-11-23 PROCEDURE — 76705 US ABDOMEN LIMITED: ICD-10-PCS | Mod: 26,,, | Performed by: RADIOLOGY

## 2020-11-23 PROCEDURE — 99999 PR PBB SHADOW E&M-EST. PATIENT-LVL IV: CPT | Mod: PBBFAC,,, | Performed by: ORTHOPAEDIC SURGERY

## 2020-11-23 PROCEDURE — 1101F PR PT FALLS ASSESS DOC 0-1 FALLS W/OUT INJ PAST YR: ICD-10-PCS | Mod: CPTII,S$GLB,, | Performed by: ORTHOPAEDIC SURGERY

## 2020-11-23 PROCEDURE — 1159F MED LIST DOCD IN RCRD: CPT | Mod: S$GLB,,, | Performed by: ORTHOPAEDIC SURGERY

## 2020-11-23 PROCEDURE — 3008F PR BODY MASS INDEX (BMI) DOCUMENTED: ICD-10-PCS | Mod: CPTII,S$GLB,, | Performed by: ORTHOPAEDIC SURGERY

## 2020-11-23 PROCEDURE — 3008F BODY MASS INDEX DOCD: CPT | Mod: CPTII,S$GLB,, | Performed by: ORTHOPAEDIC SURGERY

## 2020-11-23 PROCEDURE — 1101F PT FALLS ASSESS-DOCD LE1/YR: CPT | Mod: CPTII,S$GLB,, | Performed by: ORTHOPAEDIC SURGERY

## 2020-11-23 PROCEDURE — 99214 PR OFFICE/OUTPT VISIT, EST, LEVL IV, 30-39 MIN: ICD-10-PCS | Mod: S$GLB,,, | Performed by: ORTHOPAEDIC SURGERY

## 2020-11-23 PROCEDURE — 76705 ECHO EXAM OF ABDOMEN: CPT | Mod: 26,,, | Performed by: RADIOLOGY

## 2020-11-23 PROCEDURE — 3288F FALL RISK ASSESSMENT DOCD: CPT | Mod: CPTII,S$GLB,, | Performed by: ORTHOPAEDIC SURGERY

## 2020-11-23 PROCEDURE — 1125F AMNT PAIN NOTED PAIN PRSNT: CPT | Mod: S$GLB,,, | Performed by: ORTHOPAEDIC SURGERY

## 2020-11-23 PROCEDURE — 99999 PR PBB SHADOW E&M-EST. PATIENT-LVL IV: ICD-10-PCS | Mod: PBBFAC,,, | Performed by: ORTHOPAEDIC SURGERY

## 2020-11-23 PROCEDURE — 76705 ECHO EXAM OF ABDOMEN: CPT | Mod: TC

## 2020-11-23 PROCEDURE — 1159F PR MEDICATION LIST DOCUMENTED IN MEDICAL RECORD: ICD-10-PCS | Mod: S$GLB,,, | Performed by: ORTHOPAEDIC SURGERY

## 2020-11-23 PROCEDURE — 3288F PR FALLS RISK ASSESSMENT DOCUMENTED: ICD-10-PCS | Mod: CPTII,S$GLB,, | Performed by: ORTHOPAEDIC SURGERY

## 2020-11-23 NOTE — PROGRESS NOTES
CC:  75-year-old male presents for evaluation of right hip pain.  The patient has a history of right total hip arthroplasty that was done back in 1996.  He was doing well up until about 6 weeks ago when he slipped coming into his home from his carport.  He strain right hip at that time and has had groin pain in the right side since the fall.  He presents today for evaluation.  He currently reports his pain as a 5/10.  It is intermittent and activity related.    ROS:    Constitution: Denies chills, fever, and sweats.  HENT: Denies headaches or blurry vision.  Cardiovascular: Denies chest pain or irregular heart beat.  Respiratory: Denies cough or shortness of breath.  Gastrointestinal: Denies abdominal pain, nausea, or vomiting.  Genitourinary:  Denies urinary incontinence, bladder and kidney issues  Musculoskeletal:  Denies muscle cramps.  Positive for right hip pain  Neurological: Denies dizziness or focal weakness.  Psychiatric/Behavioral: Normal mental status.  Hematologic/Lymphatic: Denies bleeding problem or easy bruising/bleeding.  Skin: Denies rash or suspicious lesions.    Physical examination     Gen - No acute distress, well nourished, well groomed   Eyes - Extraoccular motions intact, pupils equally round and reactive to light and accommodation   ENT - normocephalic, atruamtic, oropharynx clear   Neck - Supple, no abnormal masses   Cardiovascular - regular rate and rhythm   Pulmonary - clear to auscultation bilaterally, no wheezes, ronchi, or rales   Abdomen - soft, non-tender, non-distended, positive bowel sounds   Psych - The patient is alert and oriented x3 with normal mood and affect    Examination of the Right Lower Extremity    Skin is intact throughout  Motor in intact EHL,FHL,TA,lissette  +2 DP/PT  Sensation LT intact D/P/1st    Examination of the Right Hip    C-Sign positive  Logroll negative  Stenchfield positive    Pain with ROM positive    ROM:    Flexion   120  Extension   30  Abduction    45  Adduction   20  External Rotation 45  Internal Rotation 35    Flexion contracture negative    FADIR positive  FADER negative    Tenderness to palpation over lateral and posterolateral greater tochanter negative    X-ray images were examined and personally interpreted by me.  Three views of the right hip dated 11/20/2020 show a right total hip arthroplasty that appears to be well fixed and in acceptable alignment.  There is some stress shielding in Gruen zone 7 with osteolysis noted in that area.    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    Plan:  I had a long discussion with the patient and this may just be a strain and I am not sure that his implants are loose and that any revision surgery is warranted.  We will go ahead and rule out infection and work him up for aseptic loosening.  We ordered a CBC, sed rate, CRP, and bone scan.  He can follow up when those studies are complete.

## 2020-11-23 NOTE — LETTER
November 23, 2020      Alfredito Camargo MD  [address] [place] [address] [address]  [address]  [facility] [address]           [facility]  [address] [place] [address] [address]  [facility] [address]  Phone: [phone]          Patient: Richard Bustos   MR Number: [id number]   YOB: 1945   Date of Visit: 11/23/2020       Dear Dr. Alfredito Camargo:    Thank you for referring Richard Bustos to me for evaluation. Attached you will find relevant portions of my assessment and plan of care.    If you have questions, please do not hesitate to call me. I look forward to following Richard Bustos along with you.    Sincerely,    Anirudh Felipe II, MD    Enclosure  CC:  No Recipients    If you would like to receive this communication electronically, please contact [email or web address] or [phone] to request more information on [email or web address] Link access.    For providers and/or their staff who would like to refer a patient to [facility], please contact us through our one-stop-shop provider referral line, [facility], at [phone].    If you feel you have received this communication in error or would no longer like to receive these types of communications, please e-mail [email or web address]

## 2020-12-02 ENCOUNTER — TELEPHONE (OUTPATIENT)
Dept: FAMILY MEDICINE | Facility: CLINIC | Age: 75
End: 2020-12-02

## 2020-12-02 ENCOUNTER — OFFICE VISIT (OUTPATIENT)
Dept: FAMILY MEDICINE | Facility: CLINIC | Age: 75
End: 2020-12-02
Payer: MEDICARE

## 2020-12-02 VITALS
HEART RATE: 73 BPM | HEIGHT: 69 IN | SYSTOLIC BLOOD PRESSURE: 136 MMHG | OXYGEN SATURATION: 95 % | TEMPERATURE: 98 F | BODY MASS INDEX: 43.27 KG/M2 | DIASTOLIC BLOOD PRESSURE: 70 MMHG | WEIGHT: 292.13 LBS

## 2020-12-02 DIAGNOSIS — Z87.19 HISTORY OF RECTAL BLEEDING: ICD-10-CM

## 2020-12-02 DIAGNOSIS — Z23 IMMUNIZATION DUE: ICD-10-CM

## 2020-12-02 DIAGNOSIS — Z09 HOSPITAL DISCHARGE FOLLOW-UP: Primary | ICD-10-CM

## 2020-12-02 PROCEDURE — 3075F SYST BP GE 130 - 139MM HG: CPT | Mod: CPTII,S$GLB,, | Performed by: PHYSICIAN ASSISTANT

## 2020-12-02 PROCEDURE — 99499 RISK ADDL DX/OHS AUDIT: ICD-10-PCS | Mod: S$GLB,,, | Performed by: PHYSICIAN ASSISTANT

## 2020-12-02 PROCEDURE — 3008F BODY MASS INDEX DOCD: CPT | Mod: CPTII,S$GLB,, | Performed by: PHYSICIAN ASSISTANT

## 2020-12-02 PROCEDURE — 99499 UNLISTED E&M SERVICE: CPT | Mod: S$GLB,,, | Performed by: PHYSICIAN ASSISTANT

## 2020-12-02 PROCEDURE — 1100F PR PT FALLS ASSESS DOC 2+ FALLS/FALL W/INJURY/YR: ICD-10-PCS | Mod: CPTII,S$GLB,, | Performed by: PHYSICIAN ASSISTANT

## 2020-12-02 PROCEDURE — 1159F PR MEDICATION LIST DOCUMENTED IN MEDICAL RECORD: ICD-10-PCS | Mod: S$GLB,,, | Performed by: PHYSICIAN ASSISTANT

## 2020-12-02 PROCEDURE — 3078F DIAST BP <80 MM HG: CPT | Mod: CPTII,S$GLB,, | Performed by: PHYSICIAN ASSISTANT

## 2020-12-02 PROCEDURE — 1125F AMNT PAIN NOTED PAIN PRSNT: CPT | Mod: S$GLB,,, | Performed by: PHYSICIAN ASSISTANT

## 2020-12-02 PROCEDURE — 99214 PR OFFICE/OUTPT VISIT, EST, LEVL IV, 30-39 MIN: ICD-10-PCS | Mod: 25,S$GLB,, | Performed by: PHYSICIAN ASSISTANT

## 2020-12-02 PROCEDURE — 3288F PR FALLS RISK ASSESSMENT DOCUMENTED: ICD-10-PCS | Mod: CPTII,S$GLB,, | Performed by: PHYSICIAN ASSISTANT

## 2020-12-02 PROCEDURE — 99999 PR PBB SHADOW E&M-EST. PATIENT-LVL V: CPT | Mod: PBBFAC,,, | Performed by: PHYSICIAN ASSISTANT

## 2020-12-02 PROCEDURE — 3008F PR BODY MASS INDEX (BMI) DOCUMENTED: ICD-10-PCS | Mod: CPTII,S$GLB,, | Performed by: PHYSICIAN ASSISTANT

## 2020-12-02 PROCEDURE — 1159F MED LIST DOCD IN RCRD: CPT | Mod: S$GLB,,, | Performed by: PHYSICIAN ASSISTANT

## 2020-12-02 PROCEDURE — 1100F PTFALLS ASSESS-DOCD GE2>/YR: CPT | Mod: CPTII,S$GLB,, | Performed by: PHYSICIAN ASSISTANT

## 2020-12-02 PROCEDURE — 3078F PR MOST RECENT DIASTOLIC BLOOD PRESSURE < 80 MM HG: ICD-10-PCS | Mod: CPTII,S$GLB,, | Performed by: PHYSICIAN ASSISTANT

## 2020-12-02 PROCEDURE — 99214 OFFICE O/P EST MOD 30 MIN: CPT | Mod: 25,S$GLB,, | Performed by: PHYSICIAN ASSISTANT

## 2020-12-02 PROCEDURE — 3288F FALL RISK ASSESSMENT DOCD: CPT | Mod: CPTII,S$GLB,, | Performed by: PHYSICIAN ASSISTANT

## 2020-12-02 PROCEDURE — 90694 FLU VACCINE - QUADRIVALENT - ADJUVANTED: ICD-10-PCS | Mod: S$GLB,,, | Performed by: PHYSICIAN ASSISTANT

## 2020-12-02 PROCEDURE — 99999 PR PBB SHADOW E&M-EST. PATIENT-LVL V: ICD-10-PCS | Mod: PBBFAC,,, | Performed by: PHYSICIAN ASSISTANT

## 2020-12-02 PROCEDURE — 90694 VACC AIIV4 NO PRSRV 0.5ML IM: CPT | Mod: S$GLB,,, | Performed by: PHYSICIAN ASSISTANT

## 2020-12-02 PROCEDURE — 1125F PR PAIN SEVERITY QUANTIFIED, PAIN PRESENT: ICD-10-PCS | Mod: S$GLB,,, | Performed by: PHYSICIAN ASSISTANT

## 2020-12-02 PROCEDURE — G0008 FLU VACCINE - QUADRIVALENT - ADJUVANTED: ICD-10-PCS | Mod: S$GLB,,, | Performed by: PHYSICIAN ASSISTANT

## 2020-12-02 PROCEDURE — 3075F PR MOST RECENT SYSTOLIC BLOOD PRESS GE 130-139MM HG: ICD-10-PCS | Mod: CPTII,S$GLB,, | Performed by: PHYSICIAN ASSISTANT

## 2020-12-02 PROCEDURE — G0008 ADMIN INFLUENZA VIRUS VAC: HCPCS | Mod: S$GLB,,, | Performed by: PHYSICIAN ASSISTANT

## 2020-12-02 NOTE — TELEPHONE ENCOUNTER
----- Message from Ric Anne sent at 12/2/2020 12:38 PM CST -----  Regarding: pt  Type: Needs Medical Advice    Who Called:  pt  PHONE: [phone]   Additional [facility] May be up to 20mins late for appt. Please call if will be challenge.

## 2020-12-02 NOTE — PROGRESS NOTES
Subjective:       Patient ID: Richard Bustos is a 75 y.o. male.    Chief Complaint: Hospital Follow Up    Patient with DM type 2, HTN, hyperlipidemia, coronary artery disease, GERD, hyper coagulable state chronically anticoagulated on Coumadin, and chronic pain presents for hospital follow up .   He was hospitalized for acute rectal bleeding.  He was seen in clinic for evaluation of bloody diarrhea.  Labs were done and he was found to have significantly elevated potassium level.  Patient presented to the hospital on the following day for evaluation of increased rectal bleeding.  Patient underwent colonoscopy -A few bleeding colonic angiodysplastic lesions Treated argon plasma coagulation.  He remained hemodynamically stable and did not require blood transfusion.  Patient states that he continues to have fatigue since hospital discharge.  He denies having any further episodes of rectal bleeding or diarrhea.  Patients patient medical/surgical, social and family histories have been reviewed                     Review of Systems   Constitutional: Negative for activity change, appetite change, fatigue and unexpected weight change.   Respiratory: Negative for cough, chest tightness and shortness of breath.    Cardiovascular: Negative for chest pain, palpitations and leg swelling.   Gastrointestinal: Negative for abdominal pain, anal bleeding, blood in stool, constipation, diarrhea and nausea.   Endocrine: Negative for polydipsia and polyuria.   Genitourinary: Negative for difficulty urinating, frequency, hematuria and urgency.   Musculoskeletal: Positive for arthralgias, back pain and gait problem.   Skin: Negative for rash.   Neurological: Negative for dizziness and headaches.   Psychiatric/Behavioral: Negative for dysphoric mood. The patient is not nervous/anxious.        Objective:      Physical Exam  Vitals signs reviewed.   Constitutional:       General: He is not in acute distress.     Appearance: He is  well-developed. He is obese.   Eyes:      General: No scleral icterus.     Conjunctiva/sclera: Conjunctivae normal.   Cardiovascular:      Rate and Rhythm: Normal rate and regular rhythm.      Heart sounds: Normal heart sounds.   Pulmonary:      Effort: Pulmonary effort is normal.      Breath sounds: Normal breath sounds.   Abdominal:      General: Bowel sounds are normal.      Palpations: Abdomen is soft.      Tenderness: There is no abdominal tenderness.   Neurological:      Mental Status: He is alert.   Psychiatric:         Behavior: Behavior is cooperative.         Assessment:       1. Hospital discharge follow-up    2. History of rectal bleeding    3. Immunization due        Plan:       Richard was seen today for hospital follow up.    Diagnoses and all orders for this visit:    Hospital discharge follow-up  -     CBC Auto Differential; Future    History of rectal bleeding  -     CBC Auto Differential; Future  Further recommendations will be made based on results     Immunization due  -     Influenza (FLUAD) - Quadrivalent (Adjuvanted) *Preferred* (65+) (PF)           Follow up as scheduled

## 2020-12-07 ENCOUNTER — OFFICE VISIT (OUTPATIENT)
Dept: ORTHOPEDICS | Facility: CLINIC | Age: 75
End: 2020-12-07
Payer: MEDICARE

## 2020-12-07 VITALS — RESPIRATION RATE: 16 BRPM | WEIGHT: 292 LBS | HEIGHT: 69 IN | BODY MASS INDEX: 43.25 KG/M2

## 2020-12-07 DIAGNOSIS — Z96.649 LOOSENING OF PROSTHETIC HIP, SUBSEQUENT ENCOUNTER: Primary | ICD-10-CM

## 2020-12-07 DIAGNOSIS — T84.038D LOOSENING OF PROSTHETIC HIP, SUBSEQUENT ENCOUNTER: Primary | ICD-10-CM

## 2020-12-07 DIAGNOSIS — T84.038D LOOSENING OF KNEE JOINT PROSTHESIS, SUBSEQUENT ENCOUNTER: ICD-10-CM

## 2020-12-07 DIAGNOSIS — Z96.659 LOOSENING OF KNEE JOINT PROSTHESIS, SUBSEQUENT ENCOUNTER: ICD-10-CM

## 2020-12-07 PROCEDURE — 1159F MED LIST DOCD IN RCRD: CPT | Mod: S$GLB,,, | Performed by: ORTHOPAEDIC SURGERY

## 2020-12-07 PROCEDURE — 99999 PR PBB SHADOW E&M-EST. PATIENT-LVL V: ICD-10-PCS | Mod: PBBFAC,,, | Performed by: ORTHOPAEDIC SURGERY

## 2020-12-07 PROCEDURE — 99212 OFFICE O/P EST SF 10 MIN: CPT | Mod: S$GLB,,, | Performed by: ORTHOPAEDIC SURGERY

## 2020-12-07 PROCEDURE — 1159F PR MEDICATION LIST DOCUMENTED IN MEDICAL RECORD: ICD-10-PCS | Mod: S$GLB,,, | Performed by: ORTHOPAEDIC SURGERY

## 2020-12-07 PROCEDURE — 3008F PR BODY MASS INDEX (BMI) DOCUMENTED: ICD-10-PCS | Mod: CPTII,S$GLB,, | Performed by: ORTHOPAEDIC SURGERY

## 2020-12-07 PROCEDURE — 1101F PR PT FALLS ASSESS DOC 0-1 FALLS W/OUT INJ PAST YR: ICD-10-PCS | Mod: CPTII,S$GLB,, | Performed by: ORTHOPAEDIC SURGERY

## 2020-12-07 PROCEDURE — 1101F PT FALLS ASSESS-DOCD LE1/YR: CPT | Mod: CPTII,S$GLB,, | Performed by: ORTHOPAEDIC SURGERY

## 2020-12-07 PROCEDURE — 99212 PR OFFICE/OUTPT VISIT, EST, LEVL II, 10-19 MIN: ICD-10-PCS | Mod: S$GLB,,, | Performed by: ORTHOPAEDIC SURGERY

## 2020-12-07 PROCEDURE — 1125F PR PAIN SEVERITY QUANTIFIED, PAIN PRESENT: ICD-10-PCS | Mod: S$GLB,,, | Performed by: ORTHOPAEDIC SURGERY

## 2020-12-07 PROCEDURE — 3288F PR FALLS RISK ASSESSMENT DOCUMENTED: ICD-10-PCS | Mod: CPTII,S$GLB,, | Performed by: ORTHOPAEDIC SURGERY

## 2020-12-07 PROCEDURE — 1125F AMNT PAIN NOTED PAIN PRSNT: CPT | Mod: S$GLB,,, | Performed by: ORTHOPAEDIC SURGERY

## 2020-12-07 PROCEDURE — 3288F FALL RISK ASSESSMENT DOCD: CPT | Mod: CPTII,S$GLB,, | Performed by: ORTHOPAEDIC SURGERY

## 2020-12-07 PROCEDURE — 99999 PR PBB SHADOW E&M-EST. PATIENT-LVL V: CPT | Mod: PBBFAC,,, | Performed by: ORTHOPAEDIC SURGERY

## 2020-12-07 PROCEDURE — 3008F BODY MASS INDEX DOCD: CPT | Mod: CPTII,S$GLB,, | Performed by: ORTHOPAEDIC SURGERY

## 2020-12-07 NOTE — PROGRESS NOTES
CC:  75-year-old male follows up with his right hip.  The patient had a right total hip arthroplasty done about 24 years ago and has developed groin pain in the right hip.  On his last visit we noted osteolysis of the proximal femur likely secondary to polyethylene wear.  We had sent the patient for some labs and bone scan.  Those are available today for review.    Examination of the Right Lower Extremity    Skin is intact throughout  Motor in intact EHL,FHL,TA,lissette  +2 DP/PT  Sensation LT intact D/P/1st    Examination of the Right Hip    C-Sign positive  Logroll positive  Stenchfield positive    Pain with ROM positive    ROM:    Flexion   100  Extension   10  Abduction   25  Adduction   10  External Rotation 25  Internal Rotation 15    Flexion contracture negative    FADIR positive  FADER positive    Tenderness to palpation over lateral and posterolateral greater tochanter positive    Bone scan images were examined and personally interpreted by me.   There appears to be loosening of the stem on the femoral side.    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  He would likely benefit from a revision total hip arthroplasty especially considering the amount of osteolysis that is present.  The patient has complicated cardiac issues and would benefit from a higher level of care that we can provide [facility]  I am going to refer him to our [facility] [place] for evaluation by Dr. Ríos.

## 2020-12-11 ENCOUNTER — PATIENT MESSAGE (OUTPATIENT)
Dept: OTHER | Facility: OTHER | Age: 75
End: 2020-12-11

## 2020-12-19 ENCOUNTER — PATIENT MESSAGE (OUTPATIENT)
Dept: FAMILY MEDICINE | Facility: CLINIC | Age: 75
End: 2020-12-19

## 2020-12-19 DIAGNOSIS — D50.9 IRON DEFICIENCY ANEMIA: ICD-10-CM

## 2020-12-21 RX ORDER — MECOBAL/LEVOMEFOLAT CA/B6 PHOS 2-3-35 MG
1 TABLET ORAL 2 TIMES DAILY
Qty: 180 TABLET | Refills: 3 | Status: SHIPPED | OUTPATIENT
Start: 2020-12-21 | End: 2021-12-27 | Stop reason: SDUPTHER

## 2020-12-21 RX ORDER — OMEPRAZOLE 20 MG/1
20 CAPSULE, DELAYED RELEASE ORAL DAILY
Qty: 90 CAPSULE | Refills: 3 | Status: SHIPPED | OUTPATIENT
Start: 2020-12-21 | End: 2021-12-27 | Stop reason: SDUPTHER

## 2020-12-21 RX ORDER — FERROUS SULFATE 325(65) MG
1 TABLET ORAL 2 TIMES DAILY
Qty: 180 TABLET | Refills: 1 | Status: SHIPPED | OUTPATIENT
Start: 2020-12-21 | End: 2021-06-24

## 2020-12-23 ENCOUNTER — OFFICE VISIT (OUTPATIENT)
Dept: ORTHOPEDICS | Facility: CLINIC | Age: 75
End: 2020-12-23
Payer: MEDICARE

## 2020-12-23 VITALS — HEIGHT: 69 IN | BODY MASS INDEX: 41.96 KG/M2 | WEIGHT: 283.31 LBS

## 2020-12-23 DIAGNOSIS — Z96.641 PRESENCE OF RIGHT ARTIFICIAL HIP JOINT: ICD-10-CM

## 2020-12-23 DIAGNOSIS — Z96.649 LOOSENING OF PROSTHETIC HIP, SUBSEQUENT ENCOUNTER: ICD-10-CM

## 2020-12-23 DIAGNOSIS — T84.010A: Primary | ICD-10-CM

## 2020-12-23 DIAGNOSIS — T84.038D LOOSENING OF PROSTHETIC HIP, SUBSEQUENT ENCOUNTER: ICD-10-CM

## 2020-12-23 PROCEDURE — 99999 PR PBB SHADOW E&M-EST. PATIENT-LVL IV: CPT | Mod: PBBFAC,,, | Performed by: ORTHOPAEDIC SURGERY

## 2020-12-23 PROCEDURE — 1159F PR MEDICATION LIST DOCUMENTED IN MEDICAL RECORD: ICD-10-PCS | Mod: S$GLB,,, | Performed by: ORTHOPAEDIC SURGERY

## 2020-12-23 PROCEDURE — 3288F FALL RISK ASSESSMENT DOCD: CPT | Mod: CPTII,S$GLB,, | Performed by: ORTHOPAEDIC SURGERY

## 2020-12-23 PROCEDURE — 99214 PR OFFICE/OUTPT VISIT, EST, LEVL IV, 30-39 MIN: ICD-10-PCS | Mod: S$GLB,,, | Performed by: ORTHOPAEDIC SURGERY

## 2020-12-23 PROCEDURE — 3288F PR FALLS RISK ASSESSMENT DOCUMENTED: ICD-10-PCS | Mod: CPTII,S$GLB,, | Performed by: ORTHOPAEDIC SURGERY

## 2020-12-23 PROCEDURE — 1125F PR PAIN SEVERITY QUANTIFIED, PAIN PRESENT: ICD-10-PCS | Mod: S$GLB,,, | Performed by: ORTHOPAEDIC SURGERY

## 2020-12-23 PROCEDURE — 1159F MED LIST DOCD IN RCRD: CPT | Mod: S$GLB,,, | Performed by: ORTHOPAEDIC SURGERY

## 2020-12-23 PROCEDURE — 1101F PT FALLS ASSESS-DOCD LE1/YR: CPT | Mod: CPTII,S$GLB,, | Performed by: ORTHOPAEDIC SURGERY

## 2020-12-23 PROCEDURE — 99999 PR PBB SHADOW E&M-EST. PATIENT-LVL IV: ICD-10-PCS | Mod: PBBFAC,,, | Performed by: ORTHOPAEDIC SURGERY

## 2020-12-23 PROCEDURE — 3008F BODY MASS INDEX DOCD: CPT | Mod: CPTII,S$GLB,, | Performed by: ORTHOPAEDIC SURGERY

## 2020-12-23 PROCEDURE — 3008F PR BODY MASS INDEX (BMI) DOCUMENTED: ICD-10-PCS | Mod: CPTII,S$GLB,, | Performed by: ORTHOPAEDIC SURGERY

## 2020-12-23 PROCEDURE — 1125F AMNT PAIN NOTED PAIN PRSNT: CPT | Mod: S$GLB,,, | Performed by: ORTHOPAEDIC SURGERY

## 2020-12-23 PROCEDURE — 1101F PR PT FALLS ASSESS DOC 0-1 FALLS W/OUT INJ PAST YR: ICD-10-PCS | Mod: CPTII,S$GLB,, | Performed by: ORTHOPAEDIC SURGERY

## 2020-12-23 PROCEDURE — 99214 OFFICE O/P EST MOD 30 MIN: CPT | Mod: S$GLB,,, | Performed by: ORTHOPAEDIC SURGERY

## 2020-12-23 NOTE — LETTER
December 23, 2020      Anirudh Felipe II, MD  [address] [place] [address] [address] [address]           [facility] 5th Fl  [address]TH FLO[address]  [facility] [address]  Phone: [phone]          Patient: Richard Bustos   MR Number: [id number]   YOB: 1945   Date of Visit: 12/23/2020       Dear Dr. Anirudh Felipe II:    Thank you for referring Richard Bustos to me for evaluation. Attached you will find relevant portions of my assessment and plan of care.    If you have questions, please do not hesitate to call me. I look forward to following Richard Bustos along with you.    Sincerely,    Brigido Ríos MD    Enclosure  CC:  No Recipients    If you would like to receive this communication electronically, please contact [email or web address] or [phone] to request more information on [employer] Link access.    For providers and/or their staff who would like to refer a patient to [facility], please contact us through our one-stop-shop provider referral line, [facility], at [phone].    If you feel you have received this communication in error or would no longer like to receive these types of communications, please e-mail [email or web address]

## 2020-12-23 NOTE — PROGRESS NOTES
Subjective:       Patient ID: Richard Bustos is a 75 y.o. male.    Chief Complaint: Pain of the Right Hip    HPI      Richard Bustos is a 75 year old male with pmhx right ORAL in 1996 and left hip in 2001 (both by Dr. Familia Felipe at [facility]) here with a 8 month history of right hip pain. The patient is a  Retiree from . There was a history of trauma - he fell 8 months ago and then developed hip pain after that.  The pain is sometimes mild and other times moderate-severe. The pain is located in the groin.  There is is radiation.  The pain radaites to the thigh.  The pain is described as sharp. The patient has had prior surgery. It is aggravated by walking.  It  is not alleviated by rest. There is not numbness or tingling of the lower extremity.  There is back pain.  He  has tried medications or injections (hydrocone). They have helped some.  He does have difficulty getting in or out of a car, getting dressed, or going up or down stairs.  The patient does use an assistive device (cane and walker).    Anticoagulants: coumadin, ASA 81mg qd, plavix, IVC filter   Pmhx: MTHFR mutation (causing hypercoaguability?), afib, MI (stent x2), gastric bypass, DM (A1C 6.1 on 11/2020)    Past Medical History:   Diagnosis Date    Anemia     Anemia of other chronic disease 9/13/2017    Anemia, chronic renal failure 9/13/2017    Anemia, unspecified 9/13/2017    Anticoagulant long-term use     Aorta aneurysm     Arthritis     Atrial fibrillation     CAD (coronary artery disease)     CHF (congestive heart failure)     Chronic kidney disease     Clotting disorder 9/13/2017    Colon polyp     Dementia     Diabetes mellitus     not on meds    Diabetes mellitus type II     Diverticulosis     Elevated PSA     Encounter for blood transfusion     Former smoker     General anesthetics causing adverse effect in therapeutic use     TROUBLE COMING OUT OF ANESTHESIA WITH GASTRIC BYPASS    GERD (gastroesophageal reflux  disease)     Gout     Hx of colonic polyps     Hypercoagulable state     Hyperlipidemia     Hypertension     MI, old 2010    MTHFR mutation     Myocardial infarction     9/2010    Osteomyelitis of ankle or foot 3/9/2017    Prostate cancer 06/2016    Sleep apnea     Squamous cell carcinoma 01/23/2018    Left helix, imiquimod    Venous stasis     Chronic       Current Outpatient Medications on File Prior to Visit   Medication Sig Dispense Refill    allopurinoL (ZYLOPRIM) 100 MG tablet TAKE 1 TABLET(100 MG) BY MOUTH EVERY DAY (Patient taking differently: every evening. ) 90 tablet 3    aspirin (ECOTRIN) 81 MG EC tablet Take 81 mg by mouth once daily.      atorvastatin (LIPITOR) 80 MG tablet TAKE 1 TABLET(80 MG) BY MOUTH EVERY DAY (Patient taking differently: every evening. ) 90 tablet 3    calcitRIOL (ROCALTROL) 0.5 MCG Cap 0.75 mcg once daily.   4    carvediloL (COREG) 25 MG tablet TAKE 1 TABLET(25 MG) BY MOUTH TWICE DAILY WITH MEALS 180 tablet 3    clopidogreL (PLAVIX) 75 mg tablet Take 1 tablet (75 mg total) by mouth once daily. 90 tablet 3    famotidine (PEPCID) 40 MG tablet Take 1 tablet (40 mg total) by mouth once daily. 30 tablet 11    ferrous sulfate (FEOSOL) 325 mg (65 mg iron) Tab tablet Take 1 tablet (325 mg total) by mouth 2 (two) times daily. 180 tablet 1    fluticasone propionate (FLONASE) 50 mcg/actuation nasal spray SHAKE LIQUID AND USE 2 SPRAYS(100 MCG) IN EACH NOSTRIL EVERY DAY 16 g 5    FOLIC ACID/MULTIVIT-MIN/LUTEIN (CENTRUM SILVER ORAL) Take 1 tablet by mouth once daily.      HYDROcodone-acetaminophen (NORCO) 5-325 mg per tablet Take 1 tablet by mouth every 8 (eight) hours as needed for Pain. 90 tablet 0    HYDROcodone-acetaminophen (NORCO) 5-325 mg per tablet Take 1 tablet by mouth every 8 (eight) hours as needed for Pain. 90 tablet 0    lisinopriL (PRINIVIL,ZESTRIL) 40 MG tablet Take 1 tablet (40 mg total) by mouth every evening. 90 tablet 3    magnesium oxide (MAG-OX)  400 mg (241.3 mg magnesium) tablet TAKE 1 TABLET BY MOUTH EVERY DAY 90 tablet 3    mecobal-levomefolat Ca-B6 phos (L-METHYL-B6-B12) 3-35-2 mg Tab Take 1 tablet by mouth 2 (two) times daily. 180 tablet 3    mirabegron (MYRBETRIQ) 50 mg Tb24 Take 1 tablet (50 mg total) by mouth once daily. 90 tablet 3    nitroGLYCERIN 0.4 MG/DOSE TL SPRY (NITROLINGUAL) 400 mcg/spray spray PLACE 1 SPRAY UNDER THE TONGUE EVERY 5 MINUTES AS NEEDED FOR CHEST PAIN 4.9 g 9    omeprazole (PRILOSEC) 20 MG capsule Take 1 capsule (20 mg total) by mouth once daily. 90 capsule 3    vit A/vit C/vit E/zinc/copper (PRESERVISION AREDS ORAL) Take by mouth 2 (two) times a day.       warfarin (COUMADIN) 5 MG tablet Current dose: Take 5mg daily or as directed by coumadin clinic (Patient taking differently: 5 mg except on wednesday and saturday  Wed and sat 2.5 mgs) 90 tablet 3     Current Facility-Administered Medications on File Prior to Visit   Medication Dose Route Frequency Provider Last Rate Last Dose    lactated ringers infusion   Intravenous Once PRN Evangelist Bose MD           Past Surgical History:   Procedure Laterality Date    ABDOMINAL SURGERY      CARDIAC SURGERY      3 STENTS     CAUDAL EPIDURAL STEROID INJECTION N/A 6/22/2020    Procedure: INJECTION, STEROID, SPINE, EPIDURAL, CAUDAL;  Surgeon: Evangelist Bose MD;  [facility];  Service: Pain Management;  Laterality: N/A;    COLONOSCOPY  02/2011    diverticulosis with diverticula with clot, no active bleeding    COLONOSCOPY N/A 8/24/2016    Procedure: COLONOSCOPY;  Surgeon: aSroj Borjas MD;  Location: [facility] ENDO;  Service: Endoscopy;  Laterality: N/A;    COLONOSCOPY N/A 11/18/2020    Procedure: COLONOSCOPY;  Surgeon: Saroj Borjas MD;  Location: [facility] ENDO;  Service: Endoscopy;  Laterality: N/A;    EPIDURAL STEROID INJECTION INTO LUMBAR SPINE N/A 6/22/2020    Procedure: Injection-steroid-epidural-lumbar;  Surgeon: Evangelist Bose MD;  [facility];   Service: Pain Management;  Laterality: N/A;  L3 L4 L5 S1    EPIDURAL STEROID INJECTION INTO LUMBAR SPINE N/A 9/21/2020    Procedure: Injection-steroid-epidural-lumbar;  Surgeon: Evangelist Bose MD;  Location: [facility] OR;  Service: Pain Management;  Laterality: N/A;  L5-S1    GASTRIC BYPASS  2/5/2008    IVC FILTER RETRIEVAL      JOINT REPLACEMENT  1996 and 2001    bi-lat hip replacement/Rt Hip and Lt Hip    RADIOFREQUENCY ABLATION OF LUMBAR MEDIAL BRANCH NERVE AT SINGLE LEVEL Bilateral 1/10/2020    Procedure: Radiofrequency Ablation, Nerve, Spinal, Lumbar, Medial Branch, 1 Level;  Surgeon: Evangelist Bose MD;  Location: [facility] OR;  Service: Pain Management;  Laterality: Bilateral;  L3,L4,L5    ROTATOR CUFF REPAIR      Rt shoulder    Stents  8/18/2010    x 3    UPPER GASTROINTESTINAL ENDOSCOPY  02/2011       Family History   Problem Relation Age of Onset    Heart attack Mother     Heart attack Father     Heart attack Brother     Ulcerative colitis Daughter 35    Lupus Daughter     Colon cancer Neg Hx     Colon polyps Neg Hx     Crohn's disease Neg Hx     Melanoma Neg Hx     Psoriasis Neg Hx     Eczema Neg Hx        Social History     Socioeconomic History    Marital status:      Spouse name: Not on file    Number of children: Not on file    Years of education: Not on file    Highest education level: Not on file   Occupational History    Not on file   Social Needs    Financial resource strain: Not on file    Food insecurity     Worry: Not on file     Inability: Not on file    Transportation needs     Medical: Not on file     Non-medical: Not on file   Tobacco Use    Smoking status: Former Smoker    Smokeless tobacco: Never Used    Tobacco comment: 45 yrs ago, only smoked 3-4 packs in his entire life as a teenager   Substance and Sexual Activity    Alcohol use: Not Currently     Comment: rare    Drug use: No    Sexual activity: Not Currently   Lifestyle    Physical activity      Days per week: Not on file     Minutes per session: Not on file    Stress: Not on file   Relationships    Social connections     Talks on phone: Not on file     Gets together: Not on file     Attends [employer] service: Not on file     Active member of club or organization: Not on file     Attends meetings of clubs or organizations: Not on file     Relationship status: Not on file   Other Topics Concern    Not on file   Social History Narrative    Not on file       Review of Systems      Objective:      Physical Exam    Assessment:       1. Loosening of prosthetic hip, subsequent encounter        Plan:       ***

## 2020-12-23 NOTE — PROGRESS NOTES
Subjective:      Patient ID: Richard Bustos is a 75 y.o. male.    Chief Complaint: Pain of the Right Hip    HPI   Richard Bustos is a 75 year old male with pmhx right ORAL in 1996 and left hip in 2001 (both by Dr. Familia Felipe at [facility]) here with a 8 month history of right hip pain. The patient is a  Retiree from . There was a history of trauma - he fell 8 months ago and then developed hip pain after that.  The pain is sometimes mild and other times moderate-severe. The pain is located in the groin.  There is  radiation.  The pain radaites to the thigh.  The pain is described as sharp. The patient has had prior surgery. It is aggravated by walking.  It  is not alleviated by rest. There is not numbness or tingling of the lower extremity.  There is back pain.  He  has tried medications or injections (hydrocone). They have helped some.  He does have difficulty getting in or out of a car, getting dressed, or going up or down stairs.  The patient does use an assistive device (cane and walker).    Anticoagulants: coumadin, ASA 81mg qd, plavix, IVC filter   Pmhx: MTHFR mutation (causing hypercoaguability?), afib, MI (stent x2), gastric bypass, DM (A1C 6.1 on 11/2020)  CRP 8.3  ESR 40      Past Medical History:   Diagnosis Date    Anemia     Anemia of other chronic disease 9/13/2017    Anemia, chronic renal failure 9/13/2017    Anemia, unspecified 9/13/2017    Anticoagulant long-term use     Aorta aneurysm     Arthritis     Atrial fibrillation     CAD (coronary artery disease)     CHF (congestive heart failure)     Chronic kidney disease     Clotting disorder 9/13/2017    Colon polyp     Dementia     Diabetes mellitus     not on meds    Diabetes mellitus type II     Diverticulosis     Elevated PSA     Encounter for blood transfusion     Former smoker     General anesthetics causing adverse effect in therapeutic use     TROUBLE COMING OUT OF ANESTHESIA WITH GASTRIC BYPASS    GERD  (gastroesophageal reflux disease)     Gout     Hx of colonic polyps     Hypercoagulable state     Hyperlipidemia     Hypertension     MI, old 2010    MTHFR mutation     Myocardial infarction     9/2010    Osteomyelitis of ankle or foot 3/9/2017    Prostate cancer 06/2016    Sleep apnea     Squamous cell carcinoma 01/23/2018    Left helix, imiquimod    Venous stasis     Chronic       Current Outpatient Medications on File Prior to Visit   Medication Sig Dispense Refill    allopurinoL (ZYLOPRIM) 100 MG tablet TAKE 1 TABLET(100 MG) BY MOUTH EVERY DAY (Patient taking differently: every evening. ) 90 tablet 3    aspirin (ECOTRIN) 81 MG EC tablet Take 81 mg by mouth once daily.      atorvastatin (LIPITOR) 80 MG tablet TAKE 1 TABLET(80 MG) BY MOUTH EVERY DAY (Patient taking differently: every evening. ) 90 tablet 3    calcitRIOL (ROCALTROL) 0.5 MCG Cap 0.75 mcg once daily.   4    carvediloL (COREG) 25 MG tablet TAKE 1 TABLET(25 MG) BY MOUTH TWICE DAILY WITH MEALS 180 tablet 3    clopidogreL (PLAVIX) 75 mg tablet Take 1 tablet (75 mg total) by mouth once daily. 90 tablet 3    famotidine (PEPCID) 40 MG tablet Take 1 tablet (40 mg total) by mouth once daily. 30 tablet 11    ferrous sulfate (FEOSOL) 325 mg (65 mg iron) Tab tablet Take 1 tablet (325 mg total) by mouth 2 (two) times daily. 180 tablet 1    fluticasone propionate (FLONASE) 50 mcg/actuation nasal spray SHAKE LIQUID AND USE 2 SPRAYS(100 MCG) IN EACH NOSTRIL EVERY DAY 16 g 5    FOLIC ACID/MULTIVIT-MIN/LUTEIN (CENTRUM SILVER ORAL) Take 1 tablet by mouth once daily.      HYDROcodone-acetaminophen (NORCO) 5-325 mg per tablet Take 1 tablet by mouth every 8 (eight) hours as needed for Pain. 90 tablet 0    HYDROcodone-acetaminophen (NORCO) 5-325 mg per tablet Take 1 tablet by mouth every 8 (eight) hours as needed for Pain. 90 tablet 0    lisinopriL (PRINIVIL,ZESTRIL) 40 MG tablet Take 1 tablet (40 mg total) by mouth every evening. 90 tablet 3     magnesium oxide (MAG-OX) 400 mg (241.3 mg magnesium) tablet TAKE 1 TABLET BY MOUTH EVERY DAY 90 tablet 3    mecobal-levomefolat Ca-B6 phos (L-METHYL-B6-B12) 3-35-2 mg Tab Take 1 tablet by mouth 2 (two) times daily. 180 tablet 3    mirabegron (MYRBETRIQ) 50 mg Tb24 Take 1 tablet (50 mg total) by mouth once daily. 90 tablet 3    nitroGLYCERIN 0.4 MG/DOSE TL SPRY (NITROLINGUAL) 400 mcg/spray spray PLACE 1 SPRAY UNDER THE TONGUE EVERY 5 MINUTES AS NEEDED FOR CHEST PAIN 4.9 g 9    omeprazole (PRILOSEC) 20 MG capsule Take 1 capsule (20 mg total) by mouth once daily. 90 capsule 3    vit A/vit C/vit E/zinc/copper (PRESERVISION AREDS ORAL) Take by mouth 2 (two) times a day.       warfarin (COUMADIN) 5 MG tablet Current dose: Take 5mg daily or as directed by coumadin clinic (Patient taking differently: 5 mg except on wednesday and saturday  Wed and sat 2.5 mgs) 90 tablet 3     Current Facility-Administered Medications on File Prior to Visit   Medication Dose Route Frequency Provider Last Rate Last Dose    lactated ringers infusion   Intravenous Once PRN Evangelist Bose MD           Past Surgical History:   Procedure Laterality Date    ABDOMINAL SURGERY      CARDIAC SURGERY      3 STENTS     CAUDAL EPIDURAL STEROID INJECTION N/A 6/22/2020    Procedure: INJECTION, STEROID, SPINE, EPIDURAL, CAUDAL;  Surgeon: Evangelist Bose MD;  Location: [facility] OR;  Service: Pain Management;  Laterality: N/A;    COLONOSCOPY  02/2011    diverticulosis with diverticula with clot, no active bleeding    COLONOSCOPY N/A 8/24/2016    Procedure: COLONOSCOPY;  Surgeon: Saroj Borjas MD;  Location: [facility];  Service: Endoscopy;  Laterality: N/A;    COLONOSCOPY N/A 11/18/2020    Procedure: COLONOSCOPY;  Surgeon: Saroj Borjas MD;  Location: [facility];  Service: Endoscopy;  Laterality: N/A;    EPIDURAL STEROID INJECTION INTO LUMBAR SPINE N/A 6/22/2020    Procedure: Injection-steroid-epidural-lumbar;  Surgeon: Evangelist Boes MD;   Location: [facility] OR;  Service: Pain Management;  Laterality: N/A;  L3 L4 L5 S1    EPIDURAL STEROID INJECTION INTO LUMBAR SPINE N/A 9/21/2020    Procedure: Injection-steroid-epidural-lumbar;  Surgeon: Evangelist Bose MD;  Location: [facility] OR;  Service: Pain Management;  Laterality: N/A;  L5-S1    GASTRIC BYPASS  2/5/2008    IVC FILTER RETRIEVAL      JOINT REPLACEMENT  1996 and 2001    bi-lat hip replacement/Rt Hip and Lt Hip    RADIOFREQUENCY ABLATION OF LUMBAR MEDIAL BRANCH NERVE AT SINGLE LEVEL Bilateral 1/10/2020    Procedure: Radiofrequency Ablation, Nerve, Spinal, Lumbar, Medial Branch, 1 Level;  Surgeon: Evangelist Bose MD;  Location: [facility] OR;  Service: Pain Management;  Laterality: Bilateral;  L3,L4,L5    ROTATOR CUFF REPAIR      Rt shoulder    Stents  8/18/2010    x 3    UPPER GASTROINTESTINAL ENDOSCOPY  02/2011       Family History   Problem Relation Age of Onset    Heart attack Mother     Heart attack Father     Heart attack Brother     Ulcerative colitis Daughter 35    Lupus Daughter     Colon cancer Neg Hx     Colon polyps Neg Hx     Crohn's disease Neg Hx     Melanoma Neg Hx     Psoriasis Neg Hx     Eczema Neg Hx        Social History     Socioeconomic History    Marital status:      Spouse name: Not on file    Number of children: Not on file    Years of education: Not on file    Highest education level: Not on file   Occupational History    Not on file   Social Needs    Financial resource strain: Not on file    Food insecurity     Worry: Not on file     Inability: Not on file    Transportation needs     Medical: Not on file     Non-medical: Not on file   Tobacco Use    Smoking status: Former Smoker    Smokeless tobacco: Never Used    Tobacco comment: 45 yrs ago, only smoked 3-4 packs in his entire life as a teenager   Substance and Sexual Activity    Alcohol use: Not Currently     Comment: rare    Drug use: No    Sexual activity: Not Currently   Lifestyle     Physical activity     Days per week: Not on file     Minutes per session: Not on file    Stress: Not on file   Relationships    Social connections     Talks on phone: Not on file     Gets together: Not on file     Attends Taoism service: Not on file     Active member of club or organization: Not on file     Attends meetings of clubs or organizations: Not on file     Relationship status: Not on file   Other Topics Concern    Not on file   Social History Narrative    Not on file         Review of Systems   Constitution: Negative for chills and fever.   HENT: Negative for sore throat.    Eyes: Negative for pain.   Cardiovascular: Negative for chest pain.   Respiratory: Negative for cough and shortness of breath.    Hematologic/Lymphatic: Does not bruise/bleed easily.   Skin: Negative for rash.   Musculoskeletal: Positive for joint pain. Negative for back pain and neck pain.   Gastrointestinal: Negative for abdominal pain, nausea and vomiting.   Genitourinary: Negative for dysuria.   Neurological: Negative for dizziness, headaches and weakness.   Psychiatric/Behavioral: Negative for altered mental status.   Allergic/Immunologic: Negative for persistent infections.            Objective:      Body mass index is 41.83 kg/m².        General    Constitutional: He is oriented to person, place, and time. He appears well-developed and well-nourished. No distress.   HENT:   Head: Normocephalic and atraumatic.   Nose: Nose normal.   Eyes: Conjunctivae and EOM are normal.   Neck: Normal range of motion. Neck supple.   Cardiovascular: Intact distal pulses.    Pulmonary/Chest: Effort normal. No respiratory distress.   Abdominal: Soft. There is no abdominal tenderness.   Neurological: He is alert and oriented to person, place, and time.   Psychiatric: He has a normal mood and affect. His behavior is normal.     General Musculoskeletal Exam   Gait: normal   Pelvic Obliquity: none      Right Knee Exam   Right knee exam is  normal.    Inspection   Alignment:  normal  Effusion: absent    Left Knee Exam   Left knee exam is normal.    Inspection   Alignment:  normal  Effusion: absent    Right Hip Exam     Inspection   Scars: present  Swelling: absent  Bruising: absent  No deformity of hip.  Quadriceps Atrophy:  Negative  Erythema: absent    Range of Motion   Abduction: 30   Adduction: 10   Extension: 20   Flexion: 50   External rotation: 20   Internal rotation: 30     Tests   Pain w/ forced internal rotation (ANEL): absent  Pain w/ forced external rotation (FADIR): present  Stinchfield test: positive    Other   Sensation: normal    Comments:  Posterior hip scar from prior ORAL  Chronic eburnation to lower leg  Left Hip Exam     Inspection   Scars: absent  Swelling: absent  No deformity of hip.  Quadriceps Atrophy:  negative  Erythema: absent  Bruising: absent    Range of Motion   Abduction: 30   Adduction: 30   Extension: 20   Flexion: 90   External rotation: 20   Internal rotation: 30     Tests   Pain w/ forced internal rotation (ANEL): absent  Pain w/ forced external rotation (FADIR): absent  Stinchfield test: negative    Other   Sensation: normal    Comments:  Posterior lateral hip scar from prior ORAL   Chronic eburnation to lower leg       Back (L-Spine & T-Spine) / Neck (C-Spine) Exam   Back exam is normal.      Muscle Strength   Right Lower Extremity   Hip Abduction: 5/5   Hip Adduction: 5/5   Hip Flexion: 5/5   Ankle Dorsiflexion:  5/5   Left Lower Extremity   Hip Abduction: 5/5   Hip Adduction: 5/5   Hip Flexion: 5/5   Ankle Dorsiflexion:  5/5     Reflexes     Left Side  Quadriceps:  2+    Right Side   Quadriceps:  2+    Vascular Exam     Right Pulses  Dorsalis Pedis:      2+  Posterior Tibial:      2+        Left Pulses  Dorsalis Pedis:      2+  Posterior Tibial:      2+        Capillary Refill  Right Hand: normal capillary refill  Left Hand: normal capillary refill    Edema  Right Upper Leg: absent  Left Upper Leg:  absent    Radiographs taken today and reviewed by me demonstrate B/L ORAL which appear well fixed.  Right stress shielding/osteolysis  Bone scan reviewed suggestive of osteolysis of the femoral component         Assessment:       Encounter Diagnosis   Name Primary?    Loosening of prosthetic hip, subsequent encounter           Plan:       Richard was seen today for pain.    Diagnoses and all orders for this visit:    Loosening of prosthetic hip, subsequent encounter  -     Ambulatory referral/consult to Orthopedics        WIll get CT scan to assess osteolysis.  May get aspiration.

## 2020-12-30 DIAGNOSIS — Z96.641 PRESENCE OF RIGHT ARTIFICIAL HIP JOINT: ICD-10-CM

## 2020-12-30 DIAGNOSIS — T84.038D LOOSENING OF PROSTHETIC HIP, SUBSEQUENT ENCOUNTER: Primary | ICD-10-CM

## 2020-12-30 DIAGNOSIS — Z96.649 LOOSENING OF PROSTHETIC HIP, SUBSEQUENT ENCOUNTER: Primary | ICD-10-CM

## 2021-01-04 ENCOUNTER — PATIENT MESSAGE (OUTPATIENT)
Dept: ADMINISTRATIVE | Facility: HOSPITAL | Age: 76
End: 2021-01-04

## 2021-01-04 ENCOUNTER — PATIENT MESSAGE (OUTPATIENT)
Dept: ORTHOPEDICS | Facility: CLINIC | Age: 76
End: 2021-01-04

## 2021-01-12 DIAGNOSIS — Z96.649 LOOSENING OF PROSTHETIC HIP, SUBSEQUENT ENCOUNTER: Primary | ICD-10-CM

## 2021-01-12 DIAGNOSIS — T84.038D LOOSENING OF PROSTHETIC HIP, SUBSEQUENT ENCOUNTER: Primary | ICD-10-CM

## 2021-01-13 ENCOUNTER — TELEPHONE (OUTPATIENT)
Dept: INTERVENTIONAL RADIOLOGY/VASCULAR | Facility: HOSPITAL | Age: 76
End: 2021-01-13

## 2021-01-14 ENCOUNTER — PATIENT MESSAGE (OUTPATIENT)
Dept: UROLOGY | Facility: CLINIC | Age: 76
End: 2021-01-14

## 2021-01-14 ENCOUNTER — PATIENT MESSAGE (OUTPATIENT)
Dept: ORTHOPEDICS | Facility: CLINIC | Age: 76
End: 2021-01-14

## 2021-01-15 ENCOUNTER — TELEPHONE (OUTPATIENT)
Dept: ORTHOPEDICS | Facility: CLINIC | Age: 76
End: 2021-01-15

## 2021-01-20 ENCOUNTER — TELEPHONE (OUTPATIENT)
Dept: INTERVENTIONAL RADIOLOGY/VASCULAR | Facility: HOSPITAL | Age: 76
End: 2021-01-20

## 2021-01-21 ENCOUNTER — OFFICE VISIT (OUTPATIENT)
Dept: UROLOGY | Facility: CLINIC | Age: 76
End: 2021-01-21
Payer: MEDICARE

## 2021-01-21 VITALS
HEART RATE: 58 BPM | SYSTOLIC BLOOD PRESSURE: 150 MMHG | BODY MASS INDEX: 46.65 KG/M2 | DIASTOLIC BLOOD PRESSURE: 74 MMHG | WEIGHT: 290.25 LBS | HEIGHT: 66 IN

## 2021-01-21 DIAGNOSIS — N28.1 COMPLEX RENAL CYST: ICD-10-CM

## 2021-01-21 DIAGNOSIS — C61 PROSTATE CANCER: Primary | ICD-10-CM

## 2021-01-21 DIAGNOSIS — N39.41 URGE INCONTINENCE: ICD-10-CM

## 2021-01-21 PROCEDURE — 99214 PR OFFICE/OUTPT VISIT, EST, LEVL IV, 30-39 MIN: ICD-10-PCS | Mod: 25,S$GLB,, | Performed by: UROLOGY

## 2021-01-21 PROCEDURE — 99214 OFFICE O/P EST MOD 30 MIN: CPT | Mod: 25,S$GLB,, | Performed by: UROLOGY

## 2021-01-21 PROCEDURE — 81002 URINALYSIS NONAUTO W/O SCOPE: CPT | Mod: S$GLB,,, | Performed by: UROLOGY

## 2021-01-21 PROCEDURE — 3008F PR BODY MASS INDEX (BMI) DOCUMENTED: ICD-10-PCS | Mod: CPTII,S$GLB,, | Performed by: UROLOGY

## 2021-01-21 PROCEDURE — 3008F BODY MASS INDEX DOCD: CPT | Mod: CPTII,S$GLB,, | Performed by: UROLOGY

## 2021-01-21 PROCEDURE — 3078F PR MOST RECENT DIASTOLIC BLOOD PRESSURE < 80 MM HG: ICD-10-PCS | Mod: CPTII,S$GLB,, | Performed by: UROLOGY

## 2021-01-21 PROCEDURE — 3078F DIAST BP <80 MM HG: CPT | Mod: CPTII,S$GLB,, | Performed by: UROLOGY

## 2021-01-21 PROCEDURE — 3077F SYST BP >= 140 MM HG: CPT | Mod: CPTII,S$GLB,, | Performed by: UROLOGY

## 2021-01-21 PROCEDURE — 99999 PR PBB SHADOW E&M-EST. PATIENT-LVL V: ICD-10-PCS | Mod: PBBFAC,,, | Performed by: UROLOGY

## 2021-01-21 PROCEDURE — 99999 PR PBB SHADOW E&M-EST. PATIENT-LVL V: CPT | Mod: PBBFAC,,, | Performed by: UROLOGY

## 2021-01-21 PROCEDURE — 1159F MED LIST DOCD IN RCRD: CPT | Mod: S$GLB,,, | Performed by: UROLOGY

## 2021-01-21 PROCEDURE — 3077F PR MOST RECENT SYSTOLIC BLOOD PRESSURE >= 140 MM HG: ICD-10-PCS | Mod: CPTII,S$GLB,, | Performed by: UROLOGY

## 2021-01-21 PROCEDURE — 81002 POCT URINE DIPSTICK WITHOUT MICROSCOPE: ICD-10-PCS | Mod: S$GLB,,, | Performed by: UROLOGY

## 2021-01-21 PROCEDURE — 1159F PR MEDICATION LIST DOCUMENTED IN MEDICAL RECORD: ICD-10-PCS | Mod: S$GLB,,, | Performed by: UROLOGY

## 2021-01-21 RX ORDER — CALCITRIOL 0.25 UG/1
CAPSULE ORAL
Status: ON HOLD | COMMUNITY
Start: 2021-01-18 | End: 2022-01-09 | Stop reason: HOSPADM

## 2021-01-27 ENCOUNTER — PATIENT MESSAGE (OUTPATIENT)
Dept: UROLOGY | Facility: CLINIC | Age: 76
End: 2021-01-27

## 2021-01-28 ENCOUNTER — HOSPITAL ENCOUNTER (OUTPATIENT)
Dept: INTERVENTIONAL RADIOLOGY/VASCULAR | Facility: HOSPITAL | Age: 76
Discharge: HOME | End: 2021-01-28
Attending: ORTHOPAEDIC SURGERY
Payer: MEDICARE

## 2021-01-28 VITALS
OXYGEN SATURATION: 95 % | DIASTOLIC BLOOD PRESSURE: 70 MMHG | RESPIRATION RATE: 17 BRPM | HEART RATE: 59 BPM | SYSTOLIC BLOOD PRESSURE: 155 MMHG

## 2021-01-28 DIAGNOSIS — Z96.641 PRESENCE OF RIGHT ARTIFICIAL HIP JOINT: ICD-10-CM

## 2021-01-28 DIAGNOSIS — T84.038D LOOSENING OF PROSTHETIC HIP, SUBSEQUENT ENCOUNTER: ICD-10-CM

## 2021-01-28 DIAGNOSIS — Z96.649 LOOSENING OF PROSTHETIC HIP, SUBSEQUENT ENCOUNTER: ICD-10-CM

## 2021-01-28 PROCEDURE — A4215 STERILE NEEDLE: HCPCS

## 2021-01-28 PROCEDURE — 20610 IR ASPIRATION INJECTION LARGE JOINT W FLUORO: ICD-10-PCS | Mod: RT,,, | Performed by: STUDENT IN AN ORGANIZED HEALTH CARE EDUCATION/TRAINING PROGRAM

## 2021-01-28 PROCEDURE — 77002 NEEDLE LOCALIZATION BY XRAY: CPT | Mod: 26,,, | Performed by: STUDENT IN AN ORGANIZED HEALTH CARE EDUCATION/TRAINING PROGRAM

## 2021-01-28 PROCEDURE — 25000003 PHARM REV CODE 250: Performed by: STUDENT IN AN ORGANIZED HEALTH CARE EDUCATION/TRAINING PROGRAM

## 2021-01-28 PROCEDURE — 77002 IR ASPIRATION INJECTION LARGE JOINT W FLUORO: ICD-10-PCS | Mod: 26,,, | Performed by: STUDENT IN AN ORGANIZED HEALTH CARE EDUCATION/TRAINING PROGRAM

## 2021-01-28 PROCEDURE — 20610 DRAIN/INJ JOINT/BURSA W/O US: CPT | Mod: RT | Performed by: STUDENT IN AN ORGANIZED HEALTH CARE EDUCATION/TRAINING PROGRAM

## 2021-01-28 RX ORDER — LIDOCAINE HYDROCHLORIDE 10 MG/ML
INJECTION INFILTRATION; PERINEURAL CODE/TRAUMA/SEDATION MEDICATION
Status: COMPLETED | OUTPATIENT
Start: 2021-01-28 | End: 2021-01-28

## 2021-01-28 RX ADMIN — LIDOCAINE HYDROCHLORIDE 3 ML: 10 INJECTION, SOLUTION INFILTRATION; PERINEURAL at 03:01

## 2021-02-08 ENCOUNTER — OFFICE VISIT (OUTPATIENT)
Dept: PAIN MEDICINE | Facility: CLINIC | Age: 76
End: 2021-02-08
Payer: MEDICARE

## 2021-02-08 VITALS
SYSTOLIC BLOOD PRESSURE: 119 MMHG | BODY MASS INDEX: 46.81 KG/M2 | DIASTOLIC BLOOD PRESSURE: 66 MMHG | WEIGHT: 290 LBS | HEART RATE: 62 BPM

## 2021-02-08 DIAGNOSIS — G89.29 CHRONIC HIP PAIN AFTER TOTAL REPLACEMENT OF RIGHT HIP JOINT: Primary | ICD-10-CM

## 2021-02-08 DIAGNOSIS — Z96.641 CHRONIC HIP PAIN AFTER TOTAL REPLACEMENT OF RIGHT HIP JOINT: Primary | ICD-10-CM

## 2021-02-08 DIAGNOSIS — M47.896 OTHER SPONDYLOSIS, LUMBAR REGION: ICD-10-CM

## 2021-02-08 DIAGNOSIS — M25.551 CHRONIC HIP PAIN AFTER TOTAL REPLACEMENT OF RIGHT HIP JOINT: Primary | ICD-10-CM

## 2021-02-08 DIAGNOSIS — M51.36 DDD (DEGENERATIVE DISC DISEASE), LUMBAR: ICD-10-CM

## 2021-02-08 PROCEDURE — 1159F MED LIST DOCD IN RCRD: CPT | Mod: S$GLB,,, | Performed by: ANESTHESIOLOGY

## 2021-02-08 PROCEDURE — 1159F PR MEDICATION LIST DOCUMENTED IN MEDICAL RECORD: ICD-10-PCS | Mod: S$GLB,,, | Performed by: ANESTHESIOLOGY

## 2021-02-08 PROCEDURE — 3288F FALL RISK ASSESSMENT DOCD: CPT | Mod: CPTII,S$GLB,, | Performed by: ANESTHESIOLOGY

## 2021-02-08 PROCEDURE — 1100F PR PT FALLS ASSESS DOC 2+ FALLS/FALL W/INJURY/YR: ICD-10-PCS | Mod: CPTII,S$GLB,, | Performed by: ANESTHESIOLOGY

## 2021-02-08 PROCEDURE — 3008F PR BODY MASS INDEX (BMI) DOCUMENTED: ICD-10-PCS | Mod: CPTII,S$GLB,, | Performed by: ANESTHESIOLOGY

## 2021-02-08 PROCEDURE — 3078F PR MOST RECENT DIASTOLIC BLOOD PRESSURE < 80 MM HG: ICD-10-PCS | Mod: CPTII,S$GLB,, | Performed by: ANESTHESIOLOGY

## 2021-02-08 PROCEDURE — 1100F PTFALLS ASSESS-DOCD GE2>/YR: CPT | Mod: CPTII,S$GLB,, | Performed by: ANESTHESIOLOGY

## 2021-02-08 PROCEDURE — 3078F DIAST BP <80 MM HG: CPT | Mod: CPTII,S$GLB,, | Performed by: ANESTHESIOLOGY

## 2021-02-08 PROCEDURE — 99214 PR OFFICE/OUTPT VISIT, EST, LEVL IV, 30-39 MIN: ICD-10-PCS | Mod: S$GLB,,, | Performed by: ANESTHESIOLOGY

## 2021-02-08 PROCEDURE — 3074F PR MOST RECENT SYSTOLIC BLOOD PRESSURE < 130 MM HG: ICD-10-PCS | Mod: CPTII,S$GLB,, | Performed by: ANESTHESIOLOGY

## 2021-02-08 PROCEDURE — 99499 UNLISTED E&M SERVICE: CPT | Mod: S$GLB,,, | Performed by: ANESTHESIOLOGY

## 2021-02-08 PROCEDURE — 3074F SYST BP LT 130 MM HG: CPT | Mod: CPTII,S$GLB,, | Performed by: ANESTHESIOLOGY

## 2021-02-08 PROCEDURE — 99999 PR PBB SHADOW E&M-EST. PATIENT-LVL IV: CPT | Mod: PBBFAC,,, | Performed by: ANESTHESIOLOGY

## 2021-02-08 PROCEDURE — 3008F BODY MASS INDEX DOCD: CPT | Mod: CPTII,S$GLB,, | Performed by: ANESTHESIOLOGY

## 2021-02-08 PROCEDURE — 99214 OFFICE O/P EST MOD 30 MIN: CPT | Mod: S$GLB,,, | Performed by: ANESTHESIOLOGY

## 2021-02-08 PROCEDURE — 1125F AMNT PAIN NOTED PAIN PRSNT: CPT | Mod: S$GLB,,, | Performed by: ANESTHESIOLOGY

## 2021-02-08 PROCEDURE — 99499 RISK ADDL DX/OHS AUDIT: ICD-10-PCS | Mod: S$GLB,,, | Performed by: ANESTHESIOLOGY

## 2021-02-08 PROCEDURE — 3288F PR FALLS RISK ASSESSMENT DOCUMENTED: ICD-10-PCS | Mod: CPTII,S$GLB,, | Performed by: ANESTHESIOLOGY

## 2021-02-08 PROCEDURE — 99999 PR PBB SHADOW E&M-EST. PATIENT-LVL IV: ICD-10-PCS | Mod: PBBFAC,,, | Performed by: ANESTHESIOLOGY

## 2021-02-08 PROCEDURE — 1125F PR PAIN SEVERITY QUANTIFIED, PAIN PRESENT: ICD-10-PCS | Mod: S$GLB,,, | Performed by: ANESTHESIOLOGY

## 2021-02-10 ENCOUNTER — OFFICE VISIT (OUTPATIENT)
Dept: PODIATRY | Facility: CLINIC | Age: 76
End: 2021-02-10
Payer: MEDICARE

## 2021-02-10 VITALS
BODY MASS INDEX: 46.61 KG/M2 | WEIGHT: 290 LBS | SYSTOLIC BLOOD PRESSURE: 137 MMHG | HEART RATE: 59 BPM | HEIGHT: 66 IN | DIASTOLIC BLOOD PRESSURE: 60 MMHG

## 2021-02-10 DIAGNOSIS — E11.49 TYPE II DIABETES MELLITUS WITH NEUROLOGICAL MANIFESTATIONS: Primary | ICD-10-CM

## 2021-02-10 DIAGNOSIS — B35.3 TINEA PEDIS OF BOTH FEET: ICD-10-CM

## 2021-02-10 DIAGNOSIS — B35.1 ONYCHOMYCOSIS DUE TO DERMATOPHYTE: ICD-10-CM

## 2021-02-10 DIAGNOSIS — M21.621 TAILOR'S BUNION OF RIGHT FOOT: ICD-10-CM

## 2021-02-10 DIAGNOSIS — Z79.01 LONG TERM (CURRENT) USE OF ANTICOAGULANTS: ICD-10-CM

## 2021-02-10 PROCEDURE — 3044F PR MOST RECENT HEMOGLOBIN A1C LEVEL <7.0%: ICD-10-PCS | Mod: CPTII,S$GLB,, | Performed by: PODIATRIST

## 2021-02-10 PROCEDURE — 3075F PR MOST RECENT SYSTOLIC BLOOD PRESS GE 130-139MM HG: ICD-10-PCS | Mod: CPTII,S$GLB,, | Performed by: PODIATRIST

## 2021-02-10 PROCEDURE — 99214 OFFICE O/P EST MOD 30 MIN: CPT | Mod: 25,S$GLB,, | Performed by: PODIATRIST

## 2021-02-10 PROCEDURE — 99999 PR PBB SHADOW E&M-EST. PATIENT-LVL IV: ICD-10-PCS | Mod: PBBFAC,,, | Performed by: PODIATRIST

## 2021-02-10 PROCEDURE — 3075F SYST BP GE 130 - 139MM HG: CPT | Mod: CPTII,S$GLB,, | Performed by: PODIATRIST

## 2021-02-10 PROCEDURE — 3078F DIAST BP <80 MM HG: CPT | Mod: CPTII,S$GLB,, | Performed by: PODIATRIST

## 2021-02-10 PROCEDURE — 99999 PR PBB SHADOW E&M-EST. PATIENT-LVL IV: CPT | Mod: PBBFAC,,, | Performed by: PODIATRIST

## 2021-02-10 PROCEDURE — 3078F PR MOST RECENT DIASTOLIC BLOOD PRESSURE < 80 MM HG: ICD-10-PCS | Mod: CPTII,S$GLB,, | Performed by: PODIATRIST

## 2021-02-10 PROCEDURE — 3008F BODY MASS INDEX DOCD: CPT | Mod: CPTII,S$GLB,, | Performed by: PODIATRIST

## 2021-02-10 PROCEDURE — 3288F FALL RISK ASSESSMENT DOCD: CPT | Mod: CPTII,S$GLB,, | Performed by: PODIATRIST

## 2021-02-10 PROCEDURE — 1100F PR PT FALLS ASSESS DOC 2+ FALLS/FALL W/INJURY/YR: ICD-10-PCS | Mod: CPTII,S$GLB,, | Performed by: PODIATRIST

## 2021-02-10 PROCEDURE — 3008F PR BODY MASS INDEX (BMI) DOCUMENTED: ICD-10-PCS | Mod: CPTII,S$GLB,, | Performed by: PODIATRIST

## 2021-02-10 PROCEDURE — 3044F HG A1C LEVEL LT 7.0%: CPT | Mod: CPTII,S$GLB,, | Performed by: PODIATRIST

## 2021-02-10 PROCEDURE — 11721 PR DEBRIDEMENT OF NAILS, 6 OR MORE: ICD-10-PCS | Mod: Q9,S$GLB,, | Performed by: PODIATRIST

## 2021-02-10 PROCEDURE — 11721 DEBRIDE NAIL 6 OR MORE: CPT | Mod: Q9,S$GLB,, | Performed by: PODIATRIST

## 2021-02-10 PROCEDURE — 1159F PR MEDICATION LIST DOCUMENTED IN MEDICAL RECORD: ICD-10-PCS | Mod: S$GLB,,, | Performed by: PODIATRIST

## 2021-02-10 PROCEDURE — 1100F PTFALLS ASSESS-DOCD GE2>/YR: CPT | Mod: CPTII,S$GLB,, | Performed by: PODIATRIST

## 2021-02-10 PROCEDURE — 99214 PR OFFICE/OUTPT VISIT, EST, LEVL IV, 30-39 MIN: ICD-10-PCS | Mod: 25,S$GLB,, | Performed by: PODIATRIST

## 2021-02-10 PROCEDURE — 1125F AMNT PAIN NOTED PAIN PRSNT: CPT | Mod: S$GLB,,, | Performed by: PODIATRIST

## 2021-02-10 PROCEDURE — 3288F PR FALLS RISK ASSESSMENT DOCUMENTED: ICD-10-PCS | Mod: CPTII,S$GLB,, | Performed by: PODIATRIST

## 2021-02-10 PROCEDURE — 1159F MED LIST DOCD IN RCRD: CPT | Mod: S$GLB,,, | Performed by: PODIATRIST

## 2021-02-10 PROCEDURE — 1125F PR PAIN SEVERITY QUANTIFIED, PAIN PRESENT: ICD-10-PCS | Mod: S$GLB,,, | Performed by: PODIATRIST

## 2021-02-10 RX ORDER — CICLOPIROX 80 MG/ML
SOLUTION TOPICAL NIGHTLY
Qty: 6.6 ML | Refills: 11 | Status: SHIPPED | OUTPATIENT
Start: 2021-02-10 | End: 2022-02-10

## 2021-02-10 RX ORDER — CICLOPIROX 7.7 MG/G
GEL TOPICAL 2 TIMES DAILY
Qty: 100 G | Refills: 3 | Status: SHIPPED | OUTPATIENT
Start: 2021-02-10 | End: 2022-02-10

## 2021-02-17 ENCOUNTER — PATIENT MESSAGE (OUTPATIENT)
Dept: PODIATRY | Facility: CLINIC | Age: 76
End: 2021-02-17

## 2021-02-19 ENCOUNTER — TELEPHONE (OUTPATIENT)
Dept: PODIATRY | Facility: CLINIC | Age: 76
End: 2021-02-19

## 2021-02-19 ENCOUNTER — PATIENT MESSAGE (OUTPATIENT)
Dept: PODIATRY | Facility: CLINIC | Age: 76
End: 2021-02-19

## 2021-02-19 ENCOUNTER — OFFICE VISIT (OUTPATIENT)
Dept: PODIATRY | Facility: CLINIC | Age: 76
End: 2021-02-19
Payer: MEDICARE

## 2021-02-19 VITALS
BODY MASS INDEX: 46.61 KG/M2 | HEIGHT: 66 IN | WEIGHT: 290 LBS | DIASTOLIC BLOOD PRESSURE: 70 MMHG | SYSTOLIC BLOOD PRESSURE: 149 MMHG | HEART RATE: 59 BPM

## 2021-02-19 DIAGNOSIS — L97.522 ULCER OF TOE, LEFT, WITH FAT LAYER EXPOSED: ICD-10-CM

## 2021-02-19 DIAGNOSIS — Z79.01 LONG TERM (CURRENT) USE OF ANTICOAGULANTS: ICD-10-CM

## 2021-02-19 DIAGNOSIS — E11.49 TYPE II DIABETES MELLITUS WITH NEUROLOGICAL MANIFESTATIONS: Primary | ICD-10-CM

## 2021-02-19 PROCEDURE — 99214 OFFICE O/P EST MOD 30 MIN: CPT | Mod: 25,S$GLB,, | Performed by: PODIATRIST

## 2021-02-19 PROCEDURE — 11042 DBRDMT SUBQ TIS 1ST 20SQCM/<: CPT | Mod: S$GLB,,, | Performed by: PODIATRIST

## 2021-02-19 PROCEDURE — 1100F PTFALLS ASSESS-DOCD GE2>/YR: CPT | Mod: CPTII,S$GLB,, | Performed by: PODIATRIST

## 2021-02-19 PROCEDURE — 1126F AMNT PAIN NOTED NONE PRSNT: CPT | Mod: S$GLB,,, | Performed by: PODIATRIST

## 2021-02-19 PROCEDURE — 99214 PR OFFICE/OUTPT VISIT, EST, LEVL IV, 30-39 MIN: ICD-10-PCS | Mod: 25,S$GLB,, | Performed by: PODIATRIST

## 2021-02-19 PROCEDURE — 3008F BODY MASS INDEX DOCD: CPT | Mod: CPTII,S$GLB,, | Performed by: PODIATRIST

## 2021-02-19 PROCEDURE — 1159F PR MEDICATION LIST DOCUMENTED IN MEDICAL RECORD: ICD-10-PCS | Mod: S$GLB,,, | Performed by: PODIATRIST

## 2021-02-19 PROCEDURE — 3008F PR BODY MASS INDEX (BMI) DOCUMENTED: ICD-10-PCS | Mod: CPTII,S$GLB,, | Performed by: PODIATRIST

## 2021-02-19 PROCEDURE — 1126F PR PAIN SEVERITY QUANTIFIED, NO PAIN PRESENT: ICD-10-PCS | Mod: S$GLB,,, | Performed by: PODIATRIST

## 2021-02-19 PROCEDURE — 3077F SYST BP >= 140 MM HG: CPT | Mod: CPTII,S$GLB,, | Performed by: PODIATRIST

## 2021-02-19 PROCEDURE — 87070 CULTURE OTHR SPECIMN AEROBIC: CPT

## 2021-02-19 PROCEDURE — 3078F PR MOST RECENT DIASTOLIC BLOOD PRESSURE < 80 MM HG: ICD-10-PCS | Mod: CPTII,S$GLB,, | Performed by: PODIATRIST

## 2021-02-19 PROCEDURE — 87075 CULTR BACTERIA EXCEPT BLOOD: CPT

## 2021-02-19 PROCEDURE — 99999 PR PBB SHADOW E&M-EST. PATIENT-LVL IV: ICD-10-PCS | Mod: PBBFAC,,, | Performed by: PODIATRIST

## 2021-02-19 PROCEDURE — 87186 SC STD MICRODIL/AGAR DIL: CPT | Mod: 59

## 2021-02-19 PROCEDURE — 87077 CULTURE AEROBIC IDENTIFY: CPT

## 2021-02-19 PROCEDURE — 99999 PR PBB SHADOW E&M-EST. PATIENT-LVL IV: CPT | Mod: PBBFAC,,, | Performed by: PODIATRIST

## 2021-02-19 PROCEDURE — 3044F PR MOST RECENT HEMOGLOBIN A1C LEVEL <7.0%: ICD-10-PCS | Mod: CPTII,S$GLB,, | Performed by: PODIATRIST

## 2021-02-19 PROCEDURE — 3288F FALL RISK ASSESSMENT DOCD: CPT | Mod: CPTII,S$GLB,, | Performed by: PODIATRIST

## 2021-02-19 PROCEDURE — 3077F PR MOST RECENT SYSTOLIC BLOOD PRESSURE >= 140 MM HG: ICD-10-PCS | Mod: CPTII,S$GLB,, | Performed by: PODIATRIST

## 2021-02-19 PROCEDURE — 1159F MED LIST DOCD IN RCRD: CPT | Mod: S$GLB,,, | Performed by: PODIATRIST

## 2021-02-19 PROCEDURE — 3044F HG A1C LEVEL LT 7.0%: CPT | Mod: CPTII,S$GLB,, | Performed by: PODIATRIST

## 2021-02-19 PROCEDURE — 3288F PR FALLS RISK ASSESSMENT DOCUMENTED: ICD-10-PCS | Mod: CPTII,S$GLB,, | Performed by: PODIATRIST

## 2021-02-19 PROCEDURE — 3078F DIAST BP <80 MM HG: CPT | Mod: CPTII,S$GLB,, | Performed by: PODIATRIST

## 2021-02-19 PROCEDURE — 11042 WOUND DEBRIDEMENT: ICD-10-PCS | Mod: S$GLB,,, | Performed by: PODIATRIST

## 2021-02-19 PROCEDURE — 1100F PR PT FALLS ASSESS DOC 2+ FALLS/FALL W/INJURY/YR: ICD-10-PCS | Mod: CPTII,S$GLB,, | Performed by: PODIATRIST

## 2021-02-19 RX ORDER — CLINDAMYCIN HYDROCHLORIDE 300 MG/1
300 CAPSULE ORAL 3 TIMES DAILY
Qty: 30 CAPSULE | Refills: 0 | Status: SHIPPED | OUTPATIENT
Start: 2021-02-19 | End: 2021-03-01

## 2021-02-22 LAB
BACTERIA SPEC AEROBE CULT: ABNORMAL
BACTERIA SPEC AEROBE CULT: ABNORMAL

## 2021-02-23 ENCOUNTER — TELEPHONE (OUTPATIENT)
Dept: PODIATRY | Facility: CLINIC | Age: 76
End: 2021-02-23

## 2021-02-24 ENCOUNTER — TELEPHONE (OUTPATIENT)
Dept: PAIN MEDICINE | Facility: CLINIC | Age: 76
End: 2021-02-24

## 2021-02-24 LAB — BACTERIA SPEC ANAEROBE CULT: NORMAL

## 2021-02-25 ENCOUNTER — OFFICE VISIT (OUTPATIENT)
Dept: HEMATOLOGY/ONCOLOGY | Facility: CLINIC | Age: 76
End: 2021-02-25
Payer: MEDICARE

## 2021-02-25 ENCOUNTER — OFFICE VISIT (OUTPATIENT)
Dept: PODIATRY | Facility: CLINIC | Age: 76
End: 2021-02-25
Payer: MEDICARE

## 2021-02-25 VITALS
BODY MASS INDEX: 46.61 KG/M2 | HEART RATE: 59 BPM | WEIGHT: 290 LBS | DIASTOLIC BLOOD PRESSURE: 71 MMHG | SYSTOLIC BLOOD PRESSURE: 139 MMHG | HEIGHT: 66 IN

## 2021-02-25 VITALS
DIASTOLIC BLOOD PRESSURE: 73 MMHG | WEIGHT: 289 LBS | BODY MASS INDEX: 46.65 KG/M2 | HEART RATE: 58 BPM | SYSTOLIC BLOOD PRESSURE: 132 MMHG | TEMPERATURE: 98 F | RESPIRATION RATE: 17 BRPM

## 2021-02-25 DIAGNOSIS — Z79.01 LONG TERM (CURRENT) USE OF ANTICOAGULANTS: ICD-10-CM

## 2021-02-25 DIAGNOSIS — D63.1 ANEMIA DUE TO STAGE 3 CHRONIC KIDNEY DISEASE, UNSPECIFIED WHETHER STAGE 3A OR 3B CKD: Primary | ICD-10-CM

## 2021-02-25 DIAGNOSIS — E11.49 TYPE II DIABETES MELLITUS WITH NEUROLOGICAL MANIFESTATIONS: Primary | ICD-10-CM

## 2021-02-25 DIAGNOSIS — N18.30 ANEMIA DUE TO STAGE 3 CHRONIC KIDNEY DISEASE, UNSPECIFIED WHETHER STAGE 3A OR 3B CKD: Primary | ICD-10-CM

## 2021-02-25 DIAGNOSIS — Z98.84 H/O BARIATRIC SURGERY: ICD-10-CM

## 2021-02-25 DIAGNOSIS — C61 PROSTATE CANCER: ICD-10-CM

## 2021-02-25 DIAGNOSIS — Z15.89 MTHFR MUTATION (METHYLENETETRAHYDROFOLATE REDUCTASE): ICD-10-CM

## 2021-02-25 DIAGNOSIS — D68.59 HYPERCOAGULABLE STATE: ICD-10-CM

## 2021-02-25 DIAGNOSIS — L97.522 ULCER OF TOE, LEFT, WITH FAT LAYER EXPOSED: ICD-10-CM

## 2021-02-25 PROCEDURE — 3078F DIAST BP <80 MM HG: CPT | Mod: S$GLB,,, | Performed by: INTERNAL MEDICINE

## 2021-02-25 PROCEDURE — 1159F PR MEDICATION LIST DOCUMENTED IN MEDICAL RECORD: ICD-10-PCS | Mod: S$GLB,,, | Performed by: INTERNAL MEDICINE

## 2021-02-25 PROCEDURE — 1159F PR MEDICATION LIST DOCUMENTED IN MEDICAL RECORD: ICD-10-PCS | Mod: S$GLB,,, | Performed by: PODIATRIST

## 2021-02-25 PROCEDURE — 1125F AMNT PAIN NOTED PAIN PRSNT: CPT | Mod: S$GLB,,, | Performed by: INTERNAL MEDICINE

## 2021-02-25 PROCEDURE — 1126F AMNT PAIN NOTED NONE PRSNT: CPT | Mod: S$GLB,,, | Performed by: PODIATRIST

## 2021-02-25 PROCEDURE — 3008F BODY MASS INDEX DOCD: CPT | Mod: CPTII,S$GLB,, | Performed by: PODIATRIST

## 2021-02-25 PROCEDURE — 3288F FALL RISK ASSESSMENT DOCD: CPT | Mod: S$GLB,,, | Performed by: INTERNAL MEDICINE

## 2021-02-25 PROCEDURE — 11042 WOUND DEBRIDEMENT: ICD-10-PCS | Mod: S$GLB,,, | Performed by: PODIATRIST

## 2021-02-25 PROCEDURE — 1101F PT FALLS ASSESS-DOCD LE1/YR: CPT | Mod: CPTII,S$GLB,, | Performed by: PODIATRIST

## 2021-02-25 PROCEDURE — 1100F PR PT FALLS ASSESS DOC 2+ FALLS/FALL W/INJURY/YR: ICD-10-PCS | Mod: S$GLB,,, | Performed by: INTERNAL MEDICINE

## 2021-02-25 PROCEDURE — 1159F MED LIST DOCD IN RCRD: CPT | Mod: S$GLB,,, | Performed by: INTERNAL MEDICINE

## 2021-02-25 PROCEDURE — 3008F PR BODY MASS INDEX (BMI) DOCUMENTED: ICD-10-PCS | Mod: CPTII,S$GLB,, | Performed by: PODIATRIST

## 2021-02-25 PROCEDURE — 3075F SYST BP GE 130 - 139MM HG: CPT | Mod: S$GLB,,, | Performed by: INTERNAL MEDICINE

## 2021-02-25 PROCEDURE — 99213 OFFICE O/P EST LOW 20 MIN: CPT | Mod: S$GLB,,, | Performed by: INTERNAL MEDICINE

## 2021-02-25 PROCEDURE — 3288F PR FALLS RISK ASSESSMENT DOCUMENTED: ICD-10-PCS | Mod: S$GLB,,, | Performed by: INTERNAL MEDICINE

## 2021-02-25 PROCEDURE — 99214 OFFICE O/P EST MOD 30 MIN: CPT | Mod: 25,S$GLB,, | Performed by: PODIATRIST

## 2021-02-25 PROCEDURE — 1101F PR PT FALLS ASSESS DOC 0-1 FALLS W/OUT INJ PAST YR: ICD-10-PCS | Mod: CPTII,S$GLB,, | Performed by: PODIATRIST

## 2021-02-25 PROCEDURE — 1159F MED LIST DOCD IN RCRD: CPT | Mod: S$GLB,,, | Performed by: PODIATRIST

## 2021-02-25 PROCEDURE — 3075F PR MOST RECENT SYSTOLIC BLOOD PRESS GE 130-139MM HG: ICD-10-PCS | Mod: S$GLB,,, | Performed by: INTERNAL MEDICINE

## 2021-02-25 PROCEDURE — 99999 PR PBB SHADOW E&M-EST. PATIENT-LVL IV: CPT | Mod: PBBFAC,,, | Performed by: PODIATRIST

## 2021-02-25 PROCEDURE — 3075F SYST BP GE 130 - 139MM HG: CPT | Mod: CPTII,S$GLB,, | Performed by: PODIATRIST

## 2021-02-25 PROCEDURE — 1100F PTFALLS ASSESS-DOCD GE2>/YR: CPT | Mod: S$GLB,,, | Performed by: INTERNAL MEDICINE

## 2021-02-25 PROCEDURE — 3288F FALL RISK ASSESSMENT DOCD: CPT | Mod: CPTII,S$GLB,, | Performed by: PODIATRIST

## 2021-02-25 PROCEDURE — 3078F DIAST BP <80 MM HG: CPT | Mod: CPTII,S$GLB,, | Performed by: PODIATRIST

## 2021-02-25 PROCEDURE — 3078F PR MOST RECENT DIASTOLIC BLOOD PRESSURE < 80 MM HG: ICD-10-PCS | Mod: S$GLB,,, | Performed by: INTERNAL MEDICINE

## 2021-02-25 PROCEDURE — 3008F PR BODY MASS INDEX (BMI) DOCUMENTED: ICD-10-PCS | Mod: S$GLB,,, | Performed by: INTERNAL MEDICINE

## 2021-02-25 PROCEDURE — 3044F PR MOST RECENT HEMOGLOBIN A1C LEVEL <7.0%: ICD-10-PCS | Mod: CPTII,S$GLB,, | Performed by: PODIATRIST

## 2021-02-25 PROCEDURE — 99214 PR OFFICE/OUTPT VISIT, EST, LEVL IV, 30-39 MIN: ICD-10-PCS | Mod: 25,S$GLB,, | Performed by: PODIATRIST

## 2021-02-25 PROCEDURE — 3008F BODY MASS INDEX DOCD: CPT | Mod: S$GLB,,, | Performed by: INTERNAL MEDICINE

## 2021-02-25 PROCEDURE — 3288F PR FALLS RISK ASSESSMENT DOCUMENTED: ICD-10-PCS | Mod: CPTII,S$GLB,, | Performed by: PODIATRIST

## 2021-02-25 PROCEDURE — 11042 DBRDMT SUBQ TIS 1ST 20SQCM/<: CPT | Mod: S$GLB,,, | Performed by: PODIATRIST

## 2021-02-25 PROCEDURE — 1125F PR PAIN SEVERITY QUANTIFIED, PAIN PRESENT: ICD-10-PCS | Mod: S$GLB,,, | Performed by: INTERNAL MEDICINE

## 2021-02-25 PROCEDURE — 3044F HG A1C LEVEL LT 7.0%: CPT | Mod: CPTII,S$GLB,, | Performed by: PODIATRIST

## 2021-02-25 PROCEDURE — 1126F PR PAIN SEVERITY QUANTIFIED, NO PAIN PRESENT: ICD-10-PCS | Mod: S$GLB,,, | Performed by: PODIATRIST

## 2021-02-25 PROCEDURE — 3075F PR MOST RECENT SYSTOLIC BLOOD PRESS GE 130-139MM HG: ICD-10-PCS | Mod: CPTII,S$GLB,, | Performed by: PODIATRIST

## 2021-02-25 PROCEDURE — 99213 PR OFFICE/OUTPT VISIT, EST, LEVL III, 20-29 MIN: ICD-10-PCS | Mod: S$GLB,,, | Performed by: INTERNAL MEDICINE

## 2021-02-25 PROCEDURE — 3078F PR MOST RECENT DIASTOLIC BLOOD PRESSURE < 80 MM HG: ICD-10-PCS | Mod: CPTII,S$GLB,, | Performed by: PODIATRIST

## 2021-02-25 PROCEDURE — 99999 PR PBB SHADOW E&M-EST. PATIENT-LVL IV: ICD-10-PCS | Mod: PBBFAC,,, | Performed by: PODIATRIST

## 2021-03-03 ENCOUNTER — OFFICE VISIT (OUTPATIENT)
Dept: PODIATRY | Facility: CLINIC | Age: 76
End: 2021-03-03
Payer: MEDICARE

## 2021-03-03 VITALS
HEART RATE: 59 BPM | WEIGHT: 289 LBS | HEIGHT: 66 IN | BODY MASS INDEX: 46.45 KG/M2 | SYSTOLIC BLOOD PRESSURE: 147 MMHG | DIASTOLIC BLOOD PRESSURE: 67 MMHG

## 2021-03-03 DIAGNOSIS — B35.3 TINEA PEDIS, LEFT: ICD-10-CM

## 2021-03-03 DIAGNOSIS — E11.49 TYPE II DIABETES MELLITUS WITH NEUROLOGICAL MANIFESTATIONS: Primary | ICD-10-CM

## 2021-03-03 DIAGNOSIS — Z87.898 HISTORY OF ULCERATION: ICD-10-CM

## 2021-03-03 PROCEDURE — 3044F PR MOST RECENT HEMOGLOBIN A1C LEVEL <7.0%: ICD-10-PCS | Mod: CPTII,S$GLB,, | Performed by: PODIATRIST

## 2021-03-03 PROCEDURE — 99214 OFFICE O/P EST MOD 30 MIN: CPT | Mod: S$GLB,,, | Performed by: PODIATRIST

## 2021-03-03 PROCEDURE — 1101F PT FALLS ASSESS-DOCD LE1/YR: CPT | Mod: CPTII,S$GLB,, | Performed by: PODIATRIST

## 2021-03-03 PROCEDURE — 1159F PR MEDICATION LIST DOCUMENTED IN MEDICAL RECORD: ICD-10-PCS | Mod: S$GLB,,, | Performed by: PODIATRIST

## 2021-03-03 PROCEDURE — 99214 PR OFFICE/OUTPT VISIT, EST, LEVL IV, 30-39 MIN: ICD-10-PCS | Mod: S$GLB,,, | Performed by: PODIATRIST

## 2021-03-03 PROCEDURE — 1125F AMNT PAIN NOTED PAIN PRSNT: CPT | Mod: S$GLB,,, | Performed by: PODIATRIST

## 2021-03-03 PROCEDURE — 99999 PR PBB SHADOW E&M-EST. PATIENT-LVL IV: CPT | Mod: PBBFAC,,, | Performed by: PODIATRIST

## 2021-03-03 PROCEDURE — 3288F FALL RISK ASSESSMENT DOCD: CPT | Mod: CPTII,S$GLB,, | Performed by: PODIATRIST

## 2021-03-03 PROCEDURE — 99999 PR PBB SHADOW E&M-EST. PATIENT-LVL IV: ICD-10-PCS | Mod: PBBFAC,,, | Performed by: PODIATRIST

## 2021-03-03 PROCEDURE — 3078F PR MOST RECENT DIASTOLIC BLOOD PRESSURE < 80 MM HG: ICD-10-PCS | Mod: CPTII,S$GLB,, | Performed by: PODIATRIST

## 2021-03-03 PROCEDURE — 1101F PR PT FALLS ASSESS DOC 0-1 FALLS W/OUT INJ PAST YR: ICD-10-PCS | Mod: CPTII,S$GLB,, | Performed by: PODIATRIST

## 2021-03-03 PROCEDURE — 1159F MED LIST DOCD IN RCRD: CPT | Mod: S$GLB,,, | Performed by: PODIATRIST

## 2021-03-03 PROCEDURE — 3078F DIAST BP <80 MM HG: CPT | Mod: CPTII,S$GLB,, | Performed by: PODIATRIST

## 2021-03-03 PROCEDURE — 3008F PR BODY MASS INDEX (BMI) DOCUMENTED: ICD-10-PCS | Mod: CPTII,S$GLB,, | Performed by: PODIATRIST

## 2021-03-03 PROCEDURE — 3044F HG A1C LEVEL LT 7.0%: CPT | Mod: CPTII,S$GLB,, | Performed by: PODIATRIST

## 2021-03-03 PROCEDURE — 1125F PR PAIN SEVERITY QUANTIFIED, PAIN PRESENT: ICD-10-PCS | Mod: S$GLB,,, | Performed by: PODIATRIST

## 2021-03-03 PROCEDURE — 3077F PR MOST RECENT SYSTOLIC BLOOD PRESSURE >= 140 MM HG: ICD-10-PCS | Mod: CPTII,S$GLB,, | Performed by: PODIATRIST

## 2021-03-03 PROCEDURE — 3008F BODY MASS INDEX DOCD: CPT | Mod: CPTII,S$GLB,, | Performed by: PODIATRIST

## 2021-03-03 PROCEDURE — 3288F PR FALLS RISK ASSESSMENT DOCUMENTED: ICD-10-PCS | Mod: CPTII,S$GLB,, | Performed by: PODIATRIST

## 2021-03-03 PROCEDURE — 3077F SYST BP >= 140 MM HG: CPT | Mod: CPTII,S$GLB,, | Performed by: PODIATRIST

## 2021-03-03 RX ORDER — CICLOPIROX OLAMINE 7.7 MG/G
CREAM TOPICAL 2 TIMES DAILY
Qty: 90 G | Refills: 2 | Status: SHIPPED | OUTPATIENT
Start: 2021-03-03 | End: 2022-06-20

## 2021-03-04 ENCOUNTER — PATIENT MESSAGE (OUTPATIENT)
Dept: FAMILY MEDICINE | Facility: CLINIC | Age: 76
End: 2021-03-04

## 2021-03-04 ENCOUNTER — TELEPHONE (OUTPATIENT)
Dept: PAIN MEDICINE | Facility: CLINIC | Age: 76
End: 2021-03-04

## 2021-03-04 NOTE — PROGRESS NOTES
Shanika,    The blank form is for Bree Grullon - he states he keeps telling the front it goes to Bree.  It should then be faxed to [facility] when complete.    Thank you!  Leonor ABEBE MSN, RN       Subjective:       Patient ID: Richard Bustos is a 71 y.o. male.    Chief Complaint: Dizziness (2 days) and hematoma    HPI Comments: Mr. Bustos presents to the clinic today for concern that his right lower abdominal hematoma is returning.  He had this problem in February but the hematoma did not need draining at that time.  He has noted a lump in the abdomen which concerns him that it may be returning. He does take Coumadin. He states he has felt off balance for the past two days.  He does have history of allergies.  He has some nasal congestion, no rhinorrhea.  He denies ear pain or fullness.  No headache or chest pain.  He does have diabetes and he has not been checking his blood sugars.  No slurred speech or weakness.  He does have a history of diabetic neuropathy.  He takes several medications which can cause dizziness including alprazolam, Norco.    Dizziness:   Chronicity:  New  Onset:  In the past 7 days  Progression since onset:  Unchanged  Dizziness characteristics:  Off-balanceno hearing loss, no ear congestion, no ear pain, no fever, no headaches, no tinnitus, no nausea, no vomiting, no aural fullness, no weakness, no light-headedness, no syncope, no palpitations, no numbness in extremities and no chest pain.  Aggravated by:  Getting up  Treatments tried:  Nothing   PMH includes: environmental allergies.    Review of Systems   Constitutional: Negative for chills and fever.   HENT: Negative for ear pain, hearing loss and tinnitus.    Eyes: Negative for visual disturbance.   Respiratory: Negative for cough and shortness of breath.    Cardiovascular: Negative for chest pain, palpitations and syncope.   Gastrointestinal: Negative for nausea and vomiting.   Allergic/Immunologic: Positive for environmental allergies.   Neurological: Positive for dizziness. Negative for facial asymmetry, speech difficulty, weakness, light-headedness and headaches.       Objective:      Physical Exam   Constitutional: He is  oriented to person, place, and time. He appears well-developed and well-nourished. No distress.   HENT:   Head: Normocephalic and atraumatic.   Right Ear: External ear normal.   Left Ear: External ear normal.   Mouth/Throat: Oropharynx is clear and moist. No oropharyngeal exudate.   Eyes: Pupils are equal, round, and reactive to light. Right eye exhibits no discharge. Left eye exhibits no discharge.   Neck: Neck supple. No thyromegaly present.   Cardiovascular: Normal rate and regular rhythm.  Exam reveals no gallop and no friction rub.    No murmur heard.  Pulmonary/Chest: Effort normal and breath sounds normal. No respiratory distress. He has no wheezes. He has no rales.   Abdominal: Soft. He exhibits no distension. There is no tenderness.       Lymphadenopathy:     He has no cervical adenopathy.   Neurological: He is alert and oriented to person, place, and time. He has normal strength. No cranial nerve deficit. Coordination normal. GCS eye subscore is 4. GCS verbal subscore is 5. GCS motor subscore is 6.   No drift, no facial droop   Skin: Skin is warm and dry.   Psychiatric: He has a normal mood and affect. His behavior is normal. Thought content normal.   Vitals reviewed.          Current Outpatient Prescriptions:     alendronate-vitamin D3 (FOSAMAX PLUS D) 70 mg- 2,800 unit per tablet, Take 1 tablet by mouth every 7 days., Disp: 4 tablet, Rfl: 6    allopurinol (ZYLOPRIM) 100 MG tablet, TAKE 1 TABLET(100 MG) BY MOUTH EVERY DAY, Disp: 90 tablet, Rfl: 3    alprazolam (XANAX) 1 MG tablet, Take 1 tablet (1 mg total) by mouth every evening., Disp: 30 tablet, Rfl: 0    atorvastatin (LIPITOR) 80 MG tablet, TAKE 1 TABLET(80 MG) BY MOUTH EVERY DAY, Disp: 90 tablet, Rfl: 3    calcitRIOL (ROCALTROL) 0.5 MCG Cap, , Disp: , Rfl:     carvedilol (COREG) 25 MG tablet, Take 1 tablet (25 mg total) by mouth 2 (two) times daily with meals., Disp: 180 tablet, Rfl: 3    clopidogrel (PLAVIX) 75 mg tablet, , Disp: , Rfl:      ferrous sulfate 325 (65 FE) MG EC tablet, Take 1 tablet (325 mg total) by mouth 2 (two) times daily., Disp: 180 tablet, Rfl: 3    fluticasone (FLONASE) 50 mcg/actuation nasal spray, 2 sprays by Each Nare route once daily., Disp: 1 Bottle, Rfl: 11    FOLIC ACID/MULTIVIT-MIN/LUTEIN (CENTRUM SILVER ORAL), Take 1 tablet by mouth once daily., Disp: , Rfl:     furosemide (LASIX) 40 MG tablet, TAKE 1 TABLET BY MOUTH DAILY AS NEEDED, Disp: 90 tablet, Rfl: 3    hydrocodone-acetaminophen 7.5-325mg (NORCO) 7.5-325 mg per tablet, Take 1 tablet by mouth every 6 (six) hours as needed for Pain., Disp: 90 tablet, Rfl: 0    L-METHYL-B6-B12 3-35-2 mg Tab, TAKE 1 TABLET BY MOUTH TWICE DAILY, Disp: 180 tablet, Rfl: 3    lisinopril (PRINIVIL,ZESTRIL) 20 MG tablet, Take 20 mg by mouth 2 (two) times daily. , Disp: , Rfl:     magnesium oxide (MAG-OX) 400 mg tablet, Take 1 tablet (400 mg total) by mouth once daily., Disp: 90 tablet, Rfl: 3    mirabegron (MYRBETRIQ) 50 mg Tb24, Take 1 tablet (50 mg total) by mouth once daily., Disp: 30 tablet, Rfl: 11    nitroGLYCERIN 0.4 MG/DOSE TL SPRY (NITROLINGUAL) 400 mcg/spray spray, PLACE 1 SPRAY UNDER THE TONGUE EVERY 5 MINUTES AS NEEDED FOR CHEST PAIN, Disp: 4.9 g, Rfl: 3    omeprazole (PRILOSEC) 20 MG capsule, Take 20 mg by mouth once daily., Disp: , Rfl:     pantoprazole (PROTONIX) 20 MG tablet, Take 1 tablet (20 mg total) by mouth 2 (two) times daily before meals., Disp: 60 tablet, Rfl: 5    ranitidine (ZANTAC) 150 MG tablet, TAKE 1 TABLET(150 MG) BY MOUTH TWICE DAILY, Disp: 180 tablet, Rfl: 3    salicylic acid (SALACYN) 6 % Crea, Apply 1 application topically 2 (two) times daily. (Patient taking differently: Apply 1 application topically daily as needed. ), Disp: 454 g, Rfl: 11    vit C-vit E-lutein-min-om-3 (OCUVITE) 398-33-8-150 mg-unit-mg-mg Cap, Take 1 capsule by mouth once daily., Disp: , Rfl:     warfarin (COUMADIN) 5 MG tablet, TAKE 1 TABLET BY MOUTH EVERY DAY, Disp: 90  tablet, Rfl: 3    ciclopirox (PENLAC) 8 % Soln, Apply topically nightly. (Patient taking differently: Apply topically daily as needed. ), Disp: 6.6 mL, Rfl: 11    ciclopirox 0.77 % Gel, Apply topically 2 (two) times daily., Disp: 100 g, Rfl: 3    clotrimazole-betamethasone 1-0.05% (LOTRISONE) cream, Apply topically 2 (two) times daily. To head of penis (Patient taking differently: Apply topically daily as needed. To head of penis), Disp: 1 Tube, Rfl: 2    ergocalciferol (ERGOCALCIFEROL) 50,000 unit Cap, TK 1 C PO Q WEEK, Disp: , Rfl: 0  Assessment:       1. Hematoma    2. Dizziness    3. Abdominal wall hematoma, subsequent encounter    4. Type 2 diabetes mellitus with diabetic nephropathy, without long-term current use of insulin    5. Morbid obesity due to excess calories    6. Hypertension associated with diabetes        Plan:     Hematoma  -     US Abdomen Limited; Future; Expected date: 5/3/17  -     POCT Glucose  -     CBC auto differential; Future; Expected date: 5/3/17    Dizziness  Rule out anemia, ronda. Advised to increase fluid intake.  -     Comprehensive metabolic panel; Future; Expected date: 5/3/17    Abdominal wall hematoma, subsequent encounter  US abdomen.    Type 2 diabetes mellitus with diabetic nephropathy, without long-term current use of insulin    Hypertension associated with diabetes    Morbid obesity due to excess calories  Patient readiness: acceptance and barriers:readiness    During the course of the visit the patient was educated and counseled about the following:     Diabetes:  Addressed ADA diet.  Hypertension:   Medication: no change.  Obesity:   General weight loss/lifestyle modification strategies discussed (elicit support from others; identify saboteurs; non-food rewards, etc).    Goals: Diabetes: Maintain Hemoglobin A1C below 7, Hypertension: Reduce Blood Pressure and Obesity: Reduce calorie intake and BMI    Did patient meet goals/outcomes: No    The following self management  tools provided: declined    Patient Instructions (the written plan) was given to the patient/family.     Time spent with patient: 15 minutes

## 2021-03-06 ENCOUNTER — IMMUNIZATION (OUTPATIENT)
Dept: PRIMARY CARE CLINIC | Facility: CLINIC | Age: 76
End: 2021-03-06
Payer: MEDICARE

## 2021-03-06 DIAGNOSIS — Z23 NEED FOR VACCINATION: Primary | ICD-10-CM

## 2021-03-06 PROCEDURE — 91300 PR SARS-COV- 2 COVID-19 VACCINE, NO PRSV, 30MCG/0.3ML, IM: ICD-10-PCS | Mod: S$GLB,,, | Performed by: INTERNAL MEDICINE

## 2021-03-06 PROCEDURE — 91300 PR SARS-COV- 2 COVID-19 VACCINE, NO PRSV, 30MCG/0.3ML, IM: CPT | Mod: S$GLB,,, | Performed by: INTERNAL MEDICINE

## 2021-03-06 PROCEDURE — 0001A PR IMMUNIZ ADMIN, SARS-COV-2 COVID-19 VACC, 30MCG/0.3ML, 1ST DOSE: CPT | Mod: CV19,S$GLB,, | Performed by: INTERNAL MEDICINE

## 2021-03-06 PROCEDURE — 0001A PR IMMUNIZ ADMIN, SARS-COV-2 COVID-19 VACC, 30MCG/0.3ML, 1ST DOSE: ICD-10-PCS | Mod: CV19,S$GLB,, | Performed by: INTERNAL MEDICINE

## 2021-03-06 RX ADMIN — Medication 0.3 ML: at 12:03

## 2021-03-08 ENCOUNTER — OFFICE VISIT (OUTPATIENT)
Dept: FAMILY MEDICINE | Facility: CLINIC | Age: 76
End: 2021-03-08
Payer: MEDICARE

## 2021-03-08 VITALS
DIASTOLIC BLOOD PRESSURE: 80 MMHG | BODY MASS INDEX: 46.23 KG/M2 | TEMPERATURE: 98 F | OXYGEN SATURATION: 95 % | WEIGHT: 287.69 LBS | SYSTOLIC BLOOD PRESSURE: 152 MMHG | HEIGHT: 66 IN | HEART RATE: 60 BPM

## 2021-03-08 DIAGNOSIS — I50.22 CHRONIC SYSTOLIC CONGESTIVE HEART FAILURE: ICD-10-CM

## 2021-03-08 DIAGNOSIS — K21.9 GASTROESOPHAGEAL REFLUX DISEASE WITHOUT ESOPHAGITIS: ICD-10-CM

## 2021-03-08 DIAGNOSIS — M10.9 ACUTE GOUT OF FOOT, UNSPECIFIED CAUSE, UNSPECIFIED LATERALITY: ICD-10-CM

## 2021-03-08 DIAGNOSIS — E78.2 MIXED HYPERLIPIDEMIA: ICD-10-CM

## 2021-03-08 DIAGNOSIS — N18.32 TYPE 2 DIABETES MELLITUS WITH STAGE 3B CHRONIC KIDNEY DISEASE, WITHOUT LONG-TERM CURRENT USE OF INSULIN: Primary | Chronic | ICD-10-CM

## 2021-03-08 DIAGNOSIS — N18.30 ANEMIA DUE TO STAGE 3 CHRONIC KIDNEY DISEASE, UNSPECIFIED WHETHER STAGE 3A OR 3B CKD: ICD-10-CM

## 2021-03-08 DIAGNOSIS — E66.01 MORBID OBESITY: ICD-10-CM

## 2021-03-08 DIAGNOSIS — F03.90 DEMENTIA WITHOUT BEHAVIORAL DISTURBANCE, UNSPECIFIED DEMENTIA TYPE: ICD-10-CM

## 2021-03-08 DIAGNOSIS — D68.59 HYPERCOAGULABLE STATE: ICD-10-CM

## 2021-03-08 DIAGNOSIS — Z15.89 MTHFR MUTATION (METHYLENETETRAHYDROFOLATE REDUCTASE): ICD-10-CM

## 2021-03-08 DIAGNOSIS — N18.32 STAGE 3B CHRONIC KIDNEY DISEASE: ICD-10-CM

## 2021-03-08 DIAGNOSIS — I42.9 CARDIOMYOPATHY, UNSPECIFIED TYPE: ICD-10-CM

## 2021-03-08 DIAGNOSIS — C61 PROSTATE CANCER: ICD-10-CM

## 2021-03-08 DIAGNOSIS — I73.9 PERIPHERAL VASCULAR DISEASE: ICD-10-CM

## 2021-03-08 DIAGNOSIS — I25.119 ATHEROSCLEROSIS OF NATIVE CORONARY ARTERY OF NATIVE HEART WITH ANGINA PECTORIS: ICD-10-CM

## 2021-03-08 DIAGNOSIS — I48.0 PAROXYSMAL ATRIAL FIBRILLATION: ICD-10-CM

## 2021-03-08 DIAGNOSIS — D63.1 ANEMIA DUE TO STAGE 3 CHRONIC KIDNEY DISEASE, UNSPECIFIED WHETHER STAGE 3A OR 3B CKD: ICD-10-CM

## 2021-03-08 DIAGNOSIS — E11.22 TYPE 2 DIABETES MELLITUS WITH STAGE 3B CHRONIC KIDNEY DISEASE, WITHOUT LONG-TERM CURRENT USE OF INSULIN: Primary | Chronic | ICD-10-CM

## 2021-03-08 DIAGNOSIS — I51.9 LV DYSFUNCTION: ICD-10-CM

## 2021-03-08 DIAGNOSIS — I10 ESSENTIAL HYPERTENSION: ICD-10-CM

## 2021-03-08 PROCEDURE — 99999 PR PBB SHADOW E&M-EST. PATIENT-LVL V: CPT | Mod: PBBFAC,,, | Performed by: FAMILY MEDICINE

## 2021-03-08 PROCEDURE — 99499 UNLISTED E&M SERVICE: CPT | Mod: S$GLB,,, | Performed by: FAMILY MEDICINE

## 2021-03-08 PROCEDURE — 3077F PR MOST RECENT SYSTOLIC BLOOD PRESSURE >= 140 MM HG: ICD-10-PCS | Mod: CPTII,S$GLB,, | Performed by: FAMILY MEDICINE

## 2021-03-08 PROCEDURE — 99499 RISK ADDL DX/OHS AUDIT: ICD-10-PCS | Mod: S$GLB,,, | Performed by: FAMILY MEDICINE

## 2021-03-08 PROCEDURE — 1101F PR PT FALLS ASSESS DOC 0-1 FALLS W/OUT INJ PAST YR: ICD-10-PCS | Mod: CPTII,S$GLB,, | Performed by: FAMILY MEDICINE

## 2021-03-08 PROCEDURE — 3044F PR MOST RECENT HEMOGLOBIN A1C LEVEL <7.0%: ICD-10-PCS | Mod: CPTII,S$GLB,, | Performed by: FAMILY MEDICINE

## 2021-03-08 PROCEDURE — 3079F DIAST BP 80-89 MM HG: CPT | Mod: CPTII,S$GLB,, | Performed by: FAMILY MEDICINE

## 2021-03-08 PROCEDURE — 1101F PT FALLS ASSESS-DOCD LE1/YR: CPT | Mod: CPTII,S$GLB,, | Performed by: FAMILY MEDICINE

## 2021-03-08 PROCEDURE — 3044F HG A1C LEVEL LT 7.0%: CPT | Mod: CPTII,S$GLB,, | Performed by: FAMILY MEDICINE

## 2021-03-08 PROCEDURE — 1125F AMNT PAIN NOTED PAIN PRSNT: CPT | Mod: S$GLB,,, | Performed by: FAMILY MEDICINE

## 2021-03-08 PROCEDURE — 99214 OFFICE O/P EST MOD 30 MIN: CPT | Mod: S$GLB,,, | Performed by: FAMILY MEDICINE

## 2021-03-08 PROCEDURE — 99214 PR OFFICE/OUTPT VISIT, EST, LEVL IV, 30-39 MIN: ICD-10-PCS | Mod: S$GLB,,, | Performed by: FAMILY MEDICINE

## 2021-03-08 PROCEDURE — 3288F FALL RISK ASSESSMENT DOCD: CPT | Mod: CPTII,S$GLB,, | Performed by: FAMILY MEDICINE

## 2021-03-08 PROCEDURE — 3288F PR FALLS RISK ASSESSMENT DOCUMENTED: ICD-10-PCS | Mod: CPTII,S$GLB,, | Performed by: FAMILY MEDICINE

## 2021-03-08 PROCEDURE — 3008F PR BODY MASS INDEX (BMI) DOCUMENTED: ICD-10-PCS | Mod: CPTII,S$GLB,, | Performed by: FAMILY MEDICINE

## 2021-03-08 PROCEDURE — 3077F SYST BP >= 140 MM HG: CPT | Mod: CPTII,S$GLB,, | Performed by: FAMILY MEDICINE

## 2021-03-08 PROCEDURE — 1159F MED LIST DOCD IN RCRD: CPT | Mod: S$GLB,,, | Performed by: FAMILY MEDICINE

## 2021-03-08 PROCEDURE — 3079F PR MOST RECENT DIASTOLIC BLOOD PRESSURE 80-89 MM HG: ICD-10-PCS | Mod: CPTII,S$GLB,, | Performed by: FAMILY MEDICINE

## 2021-03-08 PROCEDURE — 99999 PR PBB SHADOW E&M-EST. PATIENT-LVL V: ICD-10-PCS | Mod: PBBFAC,,, | Performed by: FAMILY MEDICINE

## 2021-03-08 PROCEDURE — 1125F PR PAIN SEVERITY QUANTIFIED, PAIN PRESENT: ICD-10-PCS | Mod: S$GLB,,, | Performed by: FAMILY MEDICINE

## 2021-03-08 PROCEDURE — 1159F PR MEDICATION LIST DOCUMENTED IN MEDICAL RECORD: ICD-10-PCS | Mod: S$GLB,,, | Performed by: FAMILY MEDICINE

## 2021-03-08 PROCEDURE — 3008F BODY MASS INDEX DOCD: CPT | Mod: CPTII,S$GLB,, | Performed by: FAMILY MEDICINE

## 2021-03-08 RX ORDER — ATORVASTATIN CALCIUM 80 MG/1
80 TABLET, FILM COATED ORAL DAILY
Qty: 90 TABLET | Refills: 3 | Status: SHIPPED | OUTPATIENT
Start: 2021-03-08 | End: 2021-12-27 | Stop reason: SDUPTHER

## 2021-03-08 RX ORDER — TRAZODONE HYDROCHLORIDE 50 MG/1
50 TABLET ORAL NIGHTLY
Qty: 30 TABLET | Refills: 11 | Status: SHIPPED | OUTPATIENT
Start: 2021-03-08 | End: 2021-12-27 | Stop reason: SDUPTHER

## 2021-03-08 RX ORDER — FAMOTIDINE 40 MG/1
40 TABLET, FILM COATED ORAL DAILY
Qty: 90 TABLET | Refills: 3 | Status: SHIPPED | OUTPATIENT
Start: 2021-03-08 | End: 2021-12-27 | Stop reason: SDUPTHER

## 2021-03-08 RX ORDER — WARFARIN SODIUM 5 MG/1
TABLET ORAL
Qty: 90 TABLET | Refills: 3 | Status: SHIPPED | OUTPATIENT
Start: 2021-03-08 | End: 2021-12-27 | Stop reason: SDUPTHER

## 2021-03-08 RX ORDER — WARFARIN SODIUM 5 MG/1
TABLET ORAL
Qty: 90 TABLET | Refills: 3 | Status: SHIPPED | OUTPATIENT
Start: 2021-03-08 | End: 2021-03-08 | Stop reason: SDUPTHER

## 2021-03-08 RX ORDER — LISINOPRIL 40 MG/1
40 TABLET ORAL NIGHTLY
Qty: 90 TABLET | Refills: 3 | Status: SHIPPED | OUTPATIENT
Start: 2021-03-08 | End: 2021-12-27 | Stop reason: SDUPTHER

## 2021-03-08 RX ORDER — ALLOPURINOL 100 MG/1
100 TABLET ORAL NIGHTLY
Qty: 90 TABLET | Refills: 3 | Status: SHIPPED | OUTPATIENT
Start: 2021-03-08 | End: 2021-12-27 | Stop reason: SDUPTHER

## 2021-03-08 RX ORDER — CARVEDILOL 25 MG/1
25 TABLET ORAL 2 TIMES DAILY WITH MEALS
Qty: 180 TABLET | Refills: 3 | Status: SHIPPED | OUTPATIENT
Start: 2021-03-08 | End: 2022-03-07 | Stop reason: SDUPTHER

## 2021-03-10 ENCOUNTER — TELEPHONE (OUTPATIENT)
Dept: PAIN MEDICINE | Facility: CLINIC | Age: 76
End: 2021-03-10

## 2021-03-14 PROBLEM — F03.90 DEMENTIA WITHOUT BEHAVIORAL DISTURBANCE: Status: ACTIVE | Noted: 2021-03-14

## 2021-03-14 PROBLEM — R79.89 ELEVATED BRAIN NATRIURETIC PEPTIDE (BNP) LEVEL: Status: RESOLVED | Noted: 2019-10-24 | Resolved: 2021-03-14

## 2021-03-18 ENCOUNTER — OFFICE VISIT (OUTPATIENT)
Dept: PODIATRY | Facility: CLINIC | Age: 76
End: 2021-03-18
Payer: MEDICARE

## 2021-03-18 VITALS
HEART RATE: 57 BPM | DIASTOLIC BLOOD PRESSURE: 89 MMHG | HEIGHT: 66 IN | SYSTOLIC BLOOD PRESSURE: 131 MMHG | WEIGHT: 287 LBS | BODY MASS INDEX: 46.12 KG/M2

## 2021-03-18 DIAGNOSIS — E11.49 TYPE II DIABETES MELLITUS WITH NEUROLOGICAL MANIFESTATIONS: Primary | ICD-10-CM

## 2021-03-18 DIAGNOSIS — L97.521 ULCER OF TOE, LEFT, LIMITED TO BREAKDOWN OF SKIN: ICD-10-CM

## 2021-03-18 DIAGNOSIS — S96.912A STRAIN OF ANKLE AND FOOT, LEFT, INITIAL ENCOUNTER: ICD-10-CM

## 2021-03-18 DIAGNOSIS — M72.2 PLANTAR FASCIITIS: ICD-10-CM

## 2021-03-18 DIAGNOSIS — M79.672 FOOT PAIN, LEFT: ICD-10-CM

## 2021-03-18 PROCEDURE — 97597 DBRDMT OPN WND 1ST 20 CM/<: CPT | Mod: S$GLB,,, | Performed by: PODIATRIST

## 2021-03-18 PROCEDURE — 29540 PR STRAPPING; ANKLE &/OR FOOT: ICD-10-PCS | Mod: 59,LT,S$GLB, | Performed by: PODIATRIST

## 2021-03-18 PROCEDURE — 3044F PR MOST RECENT HEMOGLOBIN A1C LEVEL <7.0%: ICD-10-PCS | Mod: CPTII,S$GLB,, | Performed by: PODIATRIST

## 2021-03-18 PROCEDURE — 1159F PR MEDICATION LIST DOCUMENTED IN MEDICAL RECORD: ICD-10-PCS | Mod: S$GLB,,, | Performed by: PODIATRIST

## 2021-03-18 PROCEDURE — 3075F SYST BP GE 130 - 139MM HG: CPT | Mod: CPTII,S$GLB,, | Performed by: PODIATRIST

## 2021-03-18 PROCEDURE — 3079F PR MOST RECENT DIASTOLIC BLOOD PRESSURE 80-89 MM HG: ICD-10-PCS | Mod: CPTII,S$GLB,, | Performed by: PODIATRIST

## 2021-03-18 PROCEDURE — 99214 PR OFFICE/OUTPT VISIT, EST, LEVL IV, 30-39 MIN: ICD-10-PCS | Mod: 25,S$GLB,, | Performed by: PODIATRIST

## 2021-03-18 PROCEDURE — 1101F PR PT FALLS ASSESS DOC 0-1 FALLS W/OUT INJ PAST YR: ICD-10-PCS | Mod: CPTII,S$GLB,, | Performed by: PODIATRIST

## 2021-03-18 PROCEDURE — 99999 PR PBB SHADOW E&M-EST. PATIENT-LVL IV: ICD-10-PCS | Mod: PBBFAC,,, | Performed by: PODIATRIST

## 2021-03-18 PROCEDURE — 99214 OFFICE O/P EST MOD 30 MIN: CPT | Mod: 25,S$GLB,, | Performed by: PODIATRIST

## 2021-03-18 PROCEDURE — 1159F MED LIST DOCD IN RCRD: CPT | Mod: S$GLB,,, | Performed by: PODIATRIST

## 2021-03-18 PROCEDURE — 3288F PR FALLS RISK ASSESSMENT DOCUMENTED: ICD-10-PCS | Mod: CPTII,S$GLB,, | Performed by: PODIATRIST

## 2021-03-18 PROCEDURE — 99999 PR PBB SHADOW E&M-EST. PATIENT-LVL IV: CPT | Mod: PBBFAC,,, | Performed by: PODIATRIST

## 2021-03-18 PROCEDURE — 3075F PR MOST RECENT SYSTOLIC BLOOD PRESS GE 130-139MM HG: ICD-10-PCS | Mod: CPTII,S$GLB,, | Performed by: PODIATRIST

## 2021-03-18 PROCEDURE — 3044F HG A1C LEVEL LT 7.0%: CPT | Mod: CPTII,S$GLB,, | Performed by: PODIATRIST

## 2021-03-18 PROCEDURE — 1101F PT FALLS ASSESS-DOCD LE1/YR: CPT | Mod: CPTII,S$GLB,, | Performed by: PODIATRIST

## 2021-03-18 PROCEDURE — 3288F FALL RISK ASSESSMENT DOCD: CPT | Mod: CPTII,S$GLB,, | Performed by: PODIATRIST

## 2021-03-18 PROCEDURE — 97597 WOUND DEBRIDEMENT: ICD-10-PCS | Mod: S$GLB,,, | Performed by: PODIATRIST

## 2021-03-18 PROCEDURE — 3008F PR BODY MASS INDEX (BMI) DOCUMENTED: ICD-10-PCS | Mod: CPTII,S$GLB,, | Performed by: PODIATRIST

## 2021-03-18 PROCEDURE — 29540 STRAPPING ANKLE &/FOOT: CPT | Mod: 59,LT,S$GLB, | Performed by: PODIATRIST

## 2021-03-18 PROCEDURE — 3008F BODY MASS INDEX DOCD: CPT | Mod: CPTII,S$GLB,, | Performed by: PODIATRIST

## 2021-03-18 PROCEDURE — 3079F DIAST BP 80-89 MM HG: CPT | Mod: CPTII,S$GLB,, | Performed by: PODIATRIST

## 2021-03-18 RX ORDER — LIDOCAINE HYDROCHLORIDE 20 MG/ML
JELLY TOPICAL
Qty: 30 ML | Refills: 2 | Status: SHIPPED | OUTPATIENT
Start: 2021-03-18 | End: 2023-03-17 | Stop reason: CLARIF

## 2021-03-24 ENCOUNTER — PATIENT MESSAGE (OUTPATIENT)
Dept: PODIATRY | Facility: CLINIC | Age: 76
End: 2021-03-24

## 2021-03-24 ENCOUNTER — PATIENT MESSAGE (OUTPATIENT)
Dept: UROLOGY | Facility: CLINIC | Age: 76
End: 2021-03-24

## 2021-03-24 ENCOUNTER — TELEPHONE (OUTPATIENT)
Dept: PAIN MEDICINE | Facility: CLINIC | Age: 76
End: 2021-03-24

## 2021-03-24 RX ORDER — MIRABEGRON 50 MG/1
50 TABLET, FILM COATED, EXTENDED RELEASE ORAL DAILY
Qty: 90 TABLET | Refills: 3 | Status: SHIPPED | OUTPATIENT
Start: 2021-03-24 | End: 2022-03-11 | Stop reason: SDUPTHER

## 2021-03-25 ENCOUNTER — OFFICE VISIT (OUTPATIENT)
Dept: PODIATRY | Facility: CLINIC | Age: 76
End: 2021-03-25
Payer: MEDICARE

## 2021-03-25 VITALS
HEART RATE: 56 BPM | WEIGHT: 287 LBS | HEIGHT: 66 IN | BODY MASS INDEX: 46.12 KG/M2 | DIASTOLIC BLOOD PRESSURE: 65 MMHG | SYSTOLIC BLOOD PRESSURE: 132 MMHG

## 2021-03-25 DIAGNOSIS — Z87.898 HISTORY OF ULCERATION: ICD-10-CM

## 2021-03-25 DIAGNOSIS — E11.49 TYPE II DIABETES MELLITUS WITH NEUROLOGICAL MANIFESTATIONS: Primary | ICD-10-CM

## 2021-03-25 PROCEDURE — 1125F AMNT PAIN NOTED PAIN PRSNT: CPT | Mod: S$GLB,,, | Performed by: PODIATRIST

## 2021-03-25 PROCEDURE — 3078F PR MOST RECENT DIASTOLIC BLOOD PRESSURE < 80 MM HG: ICD-10-PCS | Mod: CPTII,S$GLB,, | Performed by: PODIATRIST

## 2021-03-25 PROCEDURE — 99999 PR PBB SHADOW E&M-EST. PATIENT-LVL V: ICD-10-PCS | Mod: PBBFAC,,, | Performed by: PODIATRIST

## 2021-03-25 PROCEDURE — 1101F PR PT FALLS ASSESS DOC 0-1 FALLS W/OUT INJ PAST YR: ICD-10-PCS | Mod: CPTII,S$GLB,, | Performed by: PODIATRIST

## 2021-03-25 PROCEDURE — 3288F PR FALLS RISK ASSESSMENT DOCUMENTED: ICD-10-PCS | Mod: CPTII,S$GLB,, | Performed by: PODIATRIST

## 2021-03-25 PROCEDURE — 3044F HG A1C LEVEL LT 7.0%: CPT | Mod: CPTII,S$GLB,, | Performed by: PODIATRIST

## 2021-03-25 PROCEDURE — 1159F MED LIST DOCD IN RCRD: CPT | Mod: S$GLB,,, | Performed by: PODIATRIST

## 2021-03-25 PROCEDURE — 3008F PR BODY MASS INDEX (BMI) DOCUMENTED: ICD-10-PCS | Mod: CPTII,S$GLB,, | Performed by: PODIATRIST

## 2021-03-25 PROCEDURE — 3008F BODY MASS INDEX DOCD: CPT | Mod: CPTII,S$GLB,, | Performed by: PODIATRIST

## 2021-03-25 PROCEDURE — 3288F FALL RISK ASSESSMENT DOCD: CPT | Mod: CPTII,S$GLB,, | Performed by: PODIATRIST

## 2021-03-25 PROCEDURE — 3075F PR MOST RECENT SYSTOLIC BLOOD PRESS GE 130-139MM HG: ICD-10-PCS | Mod: CPTII,S$GLB,, | Performed by: PODIATRIST

## 2021-03-25 PROCEDURE — 3078F DIAST BP <80 MM HG: CPT | Mod: CPTII,S$GLB,, | Performed by: PODIATRIST

## 2021-03-25 PROCEDURE — 1125F PR PAIN SEVERITY QUANTIFIED, PAIN PRESENT: ICD-10-PCS | Mod: S$GLB,,, | Performed by: PODIATRIST

## 2021-03-25 PROCEDURE — 3044F PR MOST RECENT HEMOGLOBIN A1C LEVEL <7.0%: ICD-10-PCS | Mod: CPTII,S$GLB,, | Performed by: PODIATRIST

## 2021-03-25 PROCEDURE — 1159F PR MEDICATION LIST DOCUMENTED IN MEDICAL RECORD: ICD-10-PCS | Mod: S$GLB,,, | Performed by: PODIATRIST

## 2021-03-25 PROCEDURE — 99213 PR OFFICE/OUTPT VISIT, EST, LEVL III, 20-29 MIN: ICD-10-PCS | Mod: S$GLB,,, | Performed by: PODIATRIST

## 2021-03-25 PROCEDURE — 1101F PT FALLS ASSESS-DOCD LE1/YR: CPT | Mod: CPTII,S$GLB,, | Performed by: PODIATRIST

## 2021-03-25 PROCEDURE — 99213 OFFICE O/P EST LOW 20 MIN: CPT | Mod: S$GLB,,, | Performed by: PODIATRIST

## 2021-03-25 PROCEDURE — 99999 PR PBB SHADOW E&M-EST. PATIENT-LVL V: CPT | Mod: PBBFAC,,, | Performed by: PODIATRIST

## 2021-03-25 PROCEDURE — 3075F SYST BP GE 130 - 139MM HG: CPT | Mod: CPTII,S$GLB,, | Performed by: PODIATRIST

## 2021-03-27 ENCOUNTER — IMMUNIZATION (OUTPATIENT)
Dept: PRIMARY CARE CLINIC | Facility: CLINIC | Age: 76
End: 2021-03-27
Payer: MEDICARE

## 2021-03-27 DIAGNOSIS — Z23 NEED FOR VACCINATION: Primary | ICD-10-CM

## 2021-03-27 PROCEDURE — 0002A PR IMMUNIZ ADMIN, SARS-COV-2 COVID-19 VACC, 30MCG/0.3ML, 2ND DOSE: ICD-10-PCS | Mod: CV19,S$GLB,, | Performed by: INTERNAL MEDICINE

## 2021-03-27 PROCEDURE — 91300 PR SARS-COV- 2 COVID-19 VACCINE, NO PRSV, 30MCG/0.3ML, IM: ICD-10-PCS | Mod: S$GLB,,, | Performed by: INTERNAL MEDICINE

## 2021-03-27 PROCEDURE — 0002A PR IMMUNIZ ADMIN, SARS-COV-2 COVID-19 VACC, 30MCG/0.3ML, 2ND DOSE: CPT | Mod: CV19,S$GLB,, | Performed by: INTERNAL MEDICINE

## 2021-03-27 PROCEDURE — 91300 PR SARS-COV- 2 COVID-19 VACCINE, NO PRSV, 30MCG/0.3ML, IM: CPT | Mod: S$GLB,,, | Performed by: INTERNAL MEDICINE

## 2021-03-27 RX ADMIN — Medication 0.3 ML: at 08:03

## 2021-04-01 ENCOUNTER — OFFICE VISIT (OUTPATIENT)
Dept: PODIATRY | Facility: CLINIC | Age: 76
End: 2021-04-01
Payer: MEDICARE

## 2021-04-01 VITALS
SYSTOLIC BLOOD PRESSURE: 157 MMHG | BODY MASS INDEX: 46.12 KG/M2 | WEIGHT: 287 LBS | HEART RATE: 57 BPM | HEIGHT: 66 IN | DIASTOLIC BLOOD PRESSURE: 70 MMHG

## 2021-04-01 DIAGNOSIS — L97.521 ULCER OF TOE, LEFT, LIMITED TO BREAKDOWN OF SKIN: ICD-10-CM

## 2021-04-01 DIAGNOSIS — E11.49 TYPE II DIABETES MELLITUS WITH NEUROLOGICAL MANIFESTATIONS: Primary | ICD-10-CM

## 2021-04-01 PROCEDURE — 1126F PR PAIN SEVERITY QUANTIFIED, NO PAIN PRESENT: ICD-10-PCS | Mod: S$GLB,,, | Performed by: PODIATRIST

## 2021-04-01 PROCEDURE — 1101F PT FALLS ASSESS-DOCD LE1/YR: CPT | Mod: CPTII,S$GLB,, | Performed by: PODIATRIST

## 2021-04-01 PROCEDURE — 99499 NO LOS: ICD-10-PCS | Mod: S$GLB,,, | Performed by: PODIATRIST

## 2021-04-01 PROCEDURE — 1126F AMNT PAIN NOTED NONE PRSNT: CPT | Mod: S$GLB,,, | Performed by: PODIATRIST

## 2021-04-01 PROCEDURE — 99999 PR PBB SHADOW E&M-EST. PATIENT-LVL V: ICD-10-PCS | Mod: PBBFAC,,, | Performed by: PODIATRIST

## 2021-04-01 PROCEDURE — 11042 WOUND DEBRIDEMENT: ICD-10-PCS | Mod: S$GLB,,, | Performed by: PODIATRIST

## 2021-04-01 PROCEDURE — 99999 PR PBB SHADOW E&M-EST. PATIENT-LVL V: CPT | Mod: PBBFAC,,, | Performed by: PODIATRIST

## 2021-04-01 PROCEDURE — 3288F PR FALLS RISK ASSESSMENT DOCUMENTED: ICD-10-PCS | Mod: CPTII,S$GLB,, | Performed by: PODIATRIST

## 2021-04-01 PROCEDURE — 3008F BODY MASS INDEX DOCD: CPT | Mod: CPTII,S$GLB,, | Performed by: PODIATRIST

## 2021-04-01 PROCEDURE — 99499 UNLISTED E&M SERVICE: CPT | Mod: S$GLB,,, | Performed by: PODIATRIST

## 2021-04-01 PROCEDURE — 1101F PR PT FALLS ASSESS DOC 0-1 FALLS W/OUT INJ PAST YR: ICD-10-PCS | Mod: CPTII,S$GLB,, | Performed by: PODIATRIST

## 2021-04-01 PROCEDURE — 3288F FALL RISK ASSESSMENT DOCD: CPT | Mod: CPTII,S$GLB,, | Performed by: PODIATRIST

## 2021-04-01 PROCEDURE — 3008F PR BODY MASS INDEX (BMI) DOCUMENTED: ICD-10-PCS | Mod: CPTII,S$GLB,, | Performed by: PODIATRIST

## 2021-04-01 PROCEDURE — 11042 DBRDMT SUBQ TIS 1ST 20SQCM/<: CPT | Mod: S$GLB,,, | Performed by: PODIATRIST

## 2021-04-09 ENCOUNTER — TELEPHONE (OUTPATIENT)
Dept: PAIN MEDICINE | Facility: CLINIC | Age: 76
End: 2021-04-09

## 2021-04-15 ENCOUNTER — PATIENT MESSAGE (OUTPATIENT)
Dept: PAIN MEDICINE | Facility: CLINIC | Age: 76
End: 2021-04-15

## 2021-04-15 DIAGNOSIS — M47.896 OTHER SPONDYLOSIS, LUMBAR REGION: ICD-10-CM

## 2021-04-15 DIAGNOSIS — M25.551 CHRONIC HIP PAIN AFTER TOTAL REPLACEMENT OF RIGHT HIP JOINT: Primary | ICD-10-CM

## 2021-04-15 DIAGNOSIS — M25.562 CHRONIC PAIN OF LEFT KNEE: ICD-10-CM

## 2021-04-15 DIAGNOSIS — G89.29 CHRONIC HIP PAIN AFTER TOTAL REPLACEMENT OF RIGHT HIP JOINT: Primary | ICD-10-CM

## 2021-04-15 DIAGNOSIS — M51.36 DDD (DEGENERATIVE DISC DISEASE), LUMBAR: ICD-10-CM

## 2021-04-15 DIAGNOSIS — G89.29 CHRONIC PAIN OF LEFT KNEE: ICD-10-CM

## 2021-04-15 DIAGNOSIS — Z96.641 CHRONIC HIP PAIN AFTER TOTAL REPLACEMENT OF RIGHT HIP JOINT: Primary | ICD-10-CM

## 2021-04-16 RX ORDER — HYDROCODONE BITARTRATE AND ACETAMINOPHEN 5; 325 MG/1; MG/1
1 TABLET ORAL EVERY 8 HOURS PRN
Qty: 90 TABLET | Refills: 0 | Status: SHIPPED | OUTPATIENT
Start: 2021-04-16 | End: 2021-05-12

## 2021-04-20 ENCOUNTER — OFFICE VISIT (OUTPATIENT)
Dept: PODIATRY | Facility: CLINIC | Age: 76
End: 2021-04-20
Payer: MEDICARE

## 2021-04-20 VITALS
WEIGHT: 287 LBS | TEMPERATURE: 97 F | SYSTOLIC BLOOD PRESSURE: 120 MMHG | HEIGHT: 66 IN | HEART RATE: 61 BPM | BODY MASS INDEX: 46.12 KG/M2 | DIASTOLIC BLOOD PRESSURE: 60 MMHG

## 2021-04-20 DIAGNOSIS — Z87.898 HISTORY OF ULCERATION: ICD-10-CM

## 2021-04-20 DIAGNOSIS — E11.49 TYPE II DIABETES MELLITUS WITH NEUROLOGICAL MANIFESTATIONS: Primary | ICD-10-CM

## 2021-04-20 PROCEDURE — 99999 PR PBB SHADOW E&M-EST. PATIENT-LVL V: CPT | Mod: PBBFAC,,, | Performed by: PODIATRIST

## 2021-04-20 PROCEDURE — 1159F MED LIST DOCD IN RCRD: CPT | Mod: S$GLB,,, | Performed by: PODIATRIST

## 2021-04-20 PROCEDURE — 99212 PR OFFICE/OUTPT VISIT, EST, LEVL II, 10-19 MIN: ICD-10-PCS | Mod: S$GLB,,, | Performed by: PODIATRIST

## 2021-04-20 PROCEDURE — 3008F BODY MASS INDEX DOCD: CPT | Mod: CPTII,S$GLB,, | Performed by: PODIATRIST

## 2021-04-20 PROCEDURE — 1125F AMNT PAIN NOTED PAIN PRSNT: CPT | Mod: S$GLB,,, | Performed by: PODIATRIST

## 2021-04-20 PROCEDURE — 1101F PR PT FALLS ASSESS DOC 0-1 FALLS W/OUT INJ PAST YR: ICD-10-PCS | Mod: CPTII,S$GLB,, | Performed by: PODIATRIST

## 2021-04-20 PROCEDURE — 99212 OFFICE O/P EST SF 10 MIN: CPT | Mod: S$GLB,,, | Performed by: PODIATRIST

## 2021-04-20 PROCEDURE — 1125F PR PAIN SEVERITY QUANTIFIED, PAIN PRESENT: ICD-10-PCS | Mod: S$GLB,,, | Performed by: PODIATRIST

## 2021-04-20 PROCEDURE — 3288F PR FALLS RISK ASSESSMENT DOCUMENTED: ICD-10-PCS | Mod: CPTII,S$GLB,, | Performed by: PODIATRIST

## 2021-04-20 PROCEDURE — 1101F PT FALLS ASSESS-DOCD LE1/YR: CPT | Mod: CPTII,S$GLB,, | Performed by: PODIATRIST

## 2021-04-20 PROCEDURE — 3008F PR BODY MASS INDEX (BMI) DOCUMENTED: ICD-10-PCS | Mod: CPTII,S$GLB,, | Performed by: PODIATRIST

## 2021-04-20 PROCEDURE — 1159F PR MEDICATION LIST DOCUMENTED IN MEDICAL RECORD: ICD-10-PCS | Mod: S$GLB,,, | Performed by: PODIATRIST

## 2021-04-20 PROCEDURE — 99999 PR PBB SHADOW E&M-EST. PATIENT-LVL V: ICD-10-PCS | Mod: PBBFAC,,, | Performed by: PODIATRIST

## 2021-04-20 PROCEDURE — 3288F FALL RISK ASSESSMENT DOCD: CPT | Mod: CPTII,S$GLB,, | Performed by: PODIATRIST

## 2021-04-26 ENCOUNTER — OFFICE VISIT (OUTPATIENT)
Dept: UROLOGY | Facility: CLINIC | Age: 76
End: 2021-04-26
Payer: MEDICARE

## 2021-04-26 VITALS
DIASTOLIC BLOOD PRESSURE: 68 MMHG | HEIGHT: 66 IN | SYSTOLIC BLOOD PRESSURE: 114 MMHG | WEIGHT: 284.63 LBS | RESPIRATION RATE: 18 BRPM | HEART RATE: 56 BPM | BODY MASS INDEX: 45.74 KG/M2

## 2021-04-26 DIAGNOSIS — N39.41 URGE INCONTINENCE: Primary | ICD-10-CM

## 2021-04-26 DIAGNOSIS — N32.81 OAB (OVERACTIVE BLADDER): ICD-10-CM

## 2021-04-26 PROCEDURE — 99999 PR PBB SHADOW E&M-EST. PATIENT-LVL V: ICD-10-PCS | Mod: PBBFAC,,, | Performed by: NURSE PRACTITIONER

## 2021-04-26 PROCEDURE — 1126F PR PAIN SEVERITY QUANTIFIED, NO PAIN PRESENT: ICD-10-PCS | Mod: S$GLB,,, | Performed by: NURSE PRACTITIONER

## 2021-04-26 PROCEDURE — 3008F BODY MASS INDEX DOCD: CPT | Mod: CPTII,S$GLB,, | Performed by: NURSE PRACTITIONER

## 2021-04-26 PROCEDURE — 99213 PR OFFICE/OUTPT VISIT, EST, LEVL III, 20-29 MIN: ICD-10-PCS | Mod: S$GLB,,, | Performed by: NURSE PRACTITIONER

## 2021-04-26 PROCEDURE — 51798 US URINE CAPACITY MEASURE: CPT | Mod: S$GLB,,, | Performed by: NURSE PRACTITIONER

## 2021-04-26 PROCEDURE — 1126F AMNT PAIN NOTED NONE PRSNT: CPT | Mod: S$GLB,,, | Performed by: NURSE PRACTITIONER

## 2021-04-26 PROCEDURE — 1101F PT FALLS ASSESS-DOCD LE1/YR: CPT | Mod: CPTII,S$GLB,, | Performed by: NURSE PRACTITIONER

## 2021-04-26 PROCEDURE — 1159F PR MEDICATION LIST DOCUMENTED IN MEDICAL RECORD: ICD-10-PCS | Mod: S$GLB,,, | Performed by: NURSE PRACTITIONER

## 2021-04-26 PROCEDURE — 1101F PR PT FALLS ASSESS DOC 0-1 FALLS W/OUT INJ PAST YR: ICD-10-PCS | Mod: CPTII,S$GLB,, | Performed by: NURSE PRACTITIONER

## 2021-04-26 PROCEDURE — 3288F FALL RISK ASSESSMENT DOCD: CPT | Mod: CPTII,S$GLB,, | Performed by: NURSE PRACTITIONER

## 2021-04-26 PROCEDURE — 51798 PR MEAS,POST-VOID RES,US,NON-IMAGING: ICD-10-PCS | Mod: S$GLB,,, | Performed by: NURSE PRACTITIONER

## 2021-04-26 PROCEDURE — 3288F PR FALLS RISK ASSESSMENT DOCUMENTED: ICD-10-PCS | Mod: CPTII,S$GLB,, | Performed by: NURSE PRACTITIONER

## 2021-04-26 PROCEDURE — 99999 PR PBB SHADOW E&M-EST. PATIENT-LVL V: CPT | Mod: PBBFAC,,, | Performed by: NURSE PRACTITIONER

## 2021-04-26 PROCEDURE — 99213 OFFICE O/P EST LOW 20 MIN: CPT | Mod: S$GLB,,, | Performed by: NURSE PRACTITIONER

## 2021-04-26 PROCEDURE — 3008F PR BODY MASS INDEX (BMI) DOCUMENTED: ICD-10-PCS | Mod: CPTII,S$GLB,, | Performed by: NURSE PRACTITIONER

## 2021-04-26 PROCEDURE — 1159F MED LIST DOCD IN RCRD: CPT | Mod: S$GLB,,, | Performed by: NURSE PRACTITIONER

## 2021-05-12 ENCOUNTER — OFFICE VISIT (OUTPATIENT)
Dept: PAIN MEDICINE | Facility: CLINIC | Age: 76
End: 2021-05-12
Payer: MEDICARE

## 2021-05-12 VITALS
HEART RATE: 50 BPM | WEIGHT: 284 LBS | HEIGHT: 66 IN | SYSTOLIC BLOOD PRESSURE: 135 MMHG | BODY MASS INDEX: 45.64 KG/M2 | DIASTOLIC BLOOD PRESSURE: 66 MMHG

## 2021-05-12 DIAGNOSIS — M51.36 DDD (DEGENERATIVE DISC DISEASE), LUMBAR: ICD-10-CM

## 2021-05-12 DIAGNOSIS — G89.4 CHRONIC PAIN DISORDER: ICD-10-CM

## 2021-05-12 DIAGNOSIS — M17.12 PRIMARY OSTEOARTHRITIS OF LEFT KNEE: Primary | ICD-10-CM

## 2021-05-12 DIAGNOSIS — M47.896 OTHER SPONDYLOSIS, LUMBAR REGION: ICD-10-CM

## 2021-05-12 PROCEDURE — 1159F PR MEDICATION LIST DOCUMENTED IN MEDICAL RECORD: ICD-10-PCS | Mod: S$GLB,,, | Performed by: ANESTHESIOLOGY

## 2021-05-12 PROCEDURE — 99999 PR PBB SHADOW E&M-EST. PATIENT-LVL IV: ICD-10-PCS | Mod: PBBFAC,,, | Performed by: ANESTHESIOLOGY

## 2021-05-12 PROCEDURE — 1159F MED LIST DOCD IN RCRD: CPT | Mod: S$GLB,,, | Performed by: ANESTHESIOLOGY

## 2021-05-12 PROCEDURE — 1101F PR PT FALLS ASSESS DOC 0-1 FALLS W/OUT INJ PAST YR: ICD-10-PCS | Mod: CPTII,S$GLB,, | Performed by: ANESTHESIOLOGY

## 2021-05-12 PROCEDURE — 3288F FALL RISK ASSESSMENT DOCD: CPT | Mod: CPTII,S$GLB,, | Performed by: ANESTHESIOLOGY

## 2021-05-12 PROCEDURE — 99214 OFFICE O/P EST MOD 30 MIN: CPT | Mod: 25,S$GLB,, | Performed by: ANESTHESIOLOGY

## 2021-05-12 PROCEDURE — 20610 DRAIN/INJ JOINT/BURSA W/O US: CPT | Mod: LT,S$GLB,, | Performed by: ANESTHESIOLOGY

## 2021-05-12 PROCEDURE — 1125F AMNT PAIN NOTED PAIN PRSNT: CPT | Mod: S$GLB,,, | Performed by: ANESTHESIOLOGY

## 2021-05-12 PROCEDURE — 20610 LARGE JOINT ASPIRATION/INJECTION: L KNEE: ICD-10-PCS | Mod: LT,S$GLB,, | Performed by: ANESTHESIOLOGY

## 2021-05-12 PROCEDURE — 99999 PR PBB SHADOW E&M-EST. PATIENT-LVL IV: CPT | Mod: PBBFAC,,, | Performed by: ANESTHESIOLOGY

## 2021-05-12 PROCEDURE — 1125F PR PAIN SEVERITY QUANTIFIED, PAIN PRESENT: ICD-10-PCS | Mod: S$GLB,,, | Performed by: ANESTHESIOLOGY

## 2021-05-12 PROCEDURE — 3008F BODY MASS INDEX DOCD: CPT | Mod: CPTII,S$GLB,, | Performed by: ANESTHESIOLOGY

## 2021-05-12 PROCEDURE — 99214 PR OFFICE/OUTPT VISIT, EST, LEVL IV, 30-39 MIN: ICD-10-PCS | Mod: 25,S$GLB,, | Performed by: ANESTHESIOLOGY

## 2021-05-12 PROCEDURE — 3288F PR FALLS RISK ASSESSMENT DOCUMENTED: ICD-10-PCS | Mod: CPTII,S$GLB,, | Performed by: ANESTHESIOLOGY

## 2021-05-12 PROCEDURE — 1101F PT FALLS ASSESS-DOCD LE1/YR: CPT | Mod: CPTII,S$GLB,, | Performed by: ANESTHESIOLOGY

## 2021-05-12 PROCEDURE — 3008F PR BODY MASS INDEX (BMI) DOCUMENTED: ICD-10-PCS | Mod: CPTII,S$GLB,, | Performed by: ANESTHESIOLOGY

## 2021-05-12 RX ORDER — METHYLPREDNISOLONE ACETATE 40 MG/ML
40 INJECTION, SUSPENSION INTRA-ARTICULAR; INTRALESIONAL; INTRAMUSCULAR; SOFT TISSUE
Status: DISCONTINUED | OUTPATIENT
Start: 2021-05-12 | End: 2021-05-12 | Stop reason: HOSPADM

## 2021-05-12 RX ORDER — HYDROCODONE BITARTRATE AND ACETAMINOPHEN 5; 325 MG/1; MG/1
1 TABLET ORAL EVERY 8 HOURS PRN
Qty: 90 TABLET | Refills: 0 | Status: SHIPPED | OUTPATIENT
Start: 2021-05-12 | End: 2021-06-11

## 2021-05-12 RX ORDER — HYDROCODONE BITARTRATE AND ACETAMINOPHEN 5; 325 MG/1; MG/1
1 TABLET ORAL EVERY 8 HOURS PRN
Qty: 90 TABLET | Refills: 0 | Status: SHIPPED | OUTPATIENT
Start: 2021-07-09 | End: 2021-08-16 | Stop reason: SDUPTHER

## 2021-05-12 RX ORDER — HYDROCODONE BITARTRATE AND ACETAMINOPHEN 5; 325 MG/1; MG/1
1 TABLET ORAL EVERY 8 HOURS PRN
Qty: 90 TABLET | Refills: 0 | Status: SHIPPED | OUTPATIENT
Start: 2021-06-10 | End: 2021-07-10

## 2021-05-12 RX ADMIN — METHYLPREDNISOLONE ACETATE 40 MG: 40 INJECTION, SUSPENSION INTRA-ARTICULAR; INTRALESIONAL; INTRAMUSCULAR; SOFT TISSUE at 10:05

## 2021-05-20 ENCOUNTER — APPOINTMENT (OUTPATIENT)
Dept: RADIOLOGY | Facility: OTHER | Age: 76
End: 2021-05-20
Attending: PODIATRIST
Payer: MEDICARE

## 2021-05-20 ENCOUNTER — OFFICE VISIT (OUTPATIENT)
Dept: PODIATRY | Facility: CLINIC | Age: 76
End: 2021-05-20
Payer: MEDICARE

## 2021-05-20 VITALS
BODY MASS INDEX: 45.64 KG/M2 | DIASTOLIC BLOOD PRESSURE: 61 MMHG | HEIGHT: 66 IN | WEIGHT: 284 LBS | SYSTOLIC BLOOD PRESSURE: 135 MMHG | HEART RATE: 58 BPM

## 2021-05-20 DIAGNOSIS — B35.1 ONYCHOMYCOSIS DUE TO DERMATOPHYTE: ICD-10-CM

## 2021-05-20 DIAGNOSIS — E11.49 TYPE II DIABETES MELLITUS WITH NEUROLOGICAL MANIFESTATIONS: ICD-10-CM

## 2021-05-20 DIAGNOSIS — E11.49 TYPE II DIABETES MELLITUS WITH NEUROLOGICAL MANIFESTATIONS: Primary | ICD-10-CM

## 2021-05-20 DIAGNOSIS — L97.522 ULCER OF TOE, LEFT, WITH FAT LAYER EXPOSED: ICD-10-CM

## 2021-05-20 PROCEDURE — 99999 PR PBB SHADOW E&M-EST. PATIENT-LVL V: CPT | Mod: PBBFAC,,, | Performed by: PODIATRIST

## 2021-05-20 PROCEDURE — 73630 XR FOOT COMPLETE 3 VIEW LEFT: ICD-10-PCS | Mod: 26,LT,, | Performed by: RADIOLOGY

## 2021-05-20 PROCEDURE — 11721 PR DEBRIDEMENT OF NAILS, 6 OR MORE: ICD-10-PCS | Mod: Q9,59,S$GLB, | Performed by: PODIATRIST

## 2021-05-20 PROCEDURE — 11721 DEBRIDE NAIL 6 OR MORE: CPT | Mod: Q9,59,S$GLB, | Performed by: PODIATRIST

## 2021-05-20 PROCEDURE — 73630 X-RAY EXAM OF FOOT: CPT | Mod: TC,LT

## 2021-05-20 PROCEDURE — 73630 X-RAY EXAM OF FOOT: CPT | Mod: 26,LT,, | Performed by: RADIOLOGY

## 2021-05-20 PROCEDURE — 3008F PR BODY MASS INDEX (BMI) DOCUMENTED: ICD-10-PCS | Mod: CPTII,S$GLB,, | Performed by: PODIATRIST

## 2021-05-20 PROCEDURE — 1126F AMNT PAIN NOTED NONE PRSNT: CPT | Mod: S$GLB,,, | Performed by: PODIATRIST

## 2021-05-20 PROCEDURE — 11042 DBRDMT SUBQ TIS 1ST 20SQCM/<: CPT | Mod: S$GLB,,, | Performed by: PODIATRIST

## 2021-05-20 PROCEDURE — 1101F PR PT FALLS ASSESS DOC 0-1 FALLS W/OUT INJ PAST YR: ICD-10-PCS | Mod: CPTII,S$GLB,, | Performed by: PODIATRIST

## 2021-05-20 PROCEDURE — 1159F MED LIST DOCD IN RCRD: CPT | Mod: S$GLB,,, | Performed by: PODIATRIST

## 2021-05-20 PROCEDURE — 11042 WOUND DEBRIDEMENT: ICD-10-PCS | Mod: S$GLB,,, | Performed by: PODIATRIST

## 2021-05-20 PROCEDURE — 1126F PR PAIN SEVERITY QUANTIFIED, NO PAIN PRESENT: ICD-10-PCS | Mod: S$GLB,,, | Performed by: PODIATRIST

## 2021-05-20 PROCEDURE — 3288F FALL RISK ASSESSMENT DOCD: CPT | Mod: CPTII,S$GLB,, | Performed by: PODIATRIST

## 2021-05-20 PROCEDURE — 3288F PR FALLS RISK ASSESSMENT DOCUMENTED: ICD-10-PCS | Mod: CPTII,S$GLB,, | Performed by: PODIATRIST

## 2021-05-20 PROCEDURE — 99999 PR PBB SHADOW E&M-EST. PATIENT-LVL V: ICD-10-PCS | Mod: PBBFAC,,, | Performed by: PODIATRIST

## 2021-05-20 PROCEDURE — 3008F BODY MASS INDEX DOCD: CPT | Mod: CPTII,S$GLB,, | Performed by: PODIATRIST

## 2021-05-20 PROCEDURE — 99213 PR OFFICE/OUTPT VISIT, EST, LEVL III, 20-29 MIN: ICD-10-PCS | Mod: 25,S$GLB,, | Performed by: PODIATRIST

## 2021-05-20 PROCEDURE — 1159F PR MEDICATION LIST DOCUMENTED IN MEDICAL RECORD: ICD-10-PCS | Mod: S$GLB,,, | Performed by: PODIATRIST

## 2021-05-20 PROCEDURE — 99213 OFFICE O/P EST LOW 20 MIN: CPT | Mod: 25,S$GLB,, | Performed by: PODIATRIST

## 2021-05-20 PROCEDURE — 1101F PT FALLS ASSESS-DOCD LE1/YR: CPT | Mod: CPTII,S$GLB,, | Performed by: PODIATRIST

## 2021-05-23 ENCOUNTER — PATIENT OUTREACH (OUTPATIENT)
Dept: ADMINISTRATIVE | Facility: HOSPITAL | Age: 76
End: 2021-05-23

## 2021-05-27 ENCOUNTER — OFFICE VISIT (OUTPATIENT)
Dept: PODIATRY | Facility: CLINIC | Age: 76
End: 2021-05-27
Payer: MEDICARE

## 2021-05-27 ENCOUNTER — TELEPHONE (OUTPATIENT)
Dept: FAMILY MEDICINE | Facility: CLINIC | Age: 76
End: 2021-05-27

## 2021-05-27 VITALS
HEART RATE: 57 BPM | SYSTOLIC BLOOD PRESSURE: 141 MMHG | HEIGHT: 66 IN | DIASTOLIC BLOOD PRESSURE: 66 MMHG | BODY MASS INDEX: 45.64 KG/M2 | WEIGHT: 284 LBS

## 2021-05-27 DIAGNOSIS — E11.49 TYPE II DIABETES MELLITUS WITH NEUROLOGICAL MANIFESTATIONS: Primary | ICD-10-CM

## 2021-05-27 DIAGNOSIS — L97.522 ULCER OF TOE, LEFT, WITH FAT LAYER EXPOSED: ICD-10-CM

## 2021-05-27 PROCEDURE — 3288F PR FALLS RISK ASSESSMENT DOCUMENTED: ICD-10-PCS | Mod: CPTII,S$GLB,, | Performed by: PODIATRIST

## 2021-05-27 PROCEDURE — 1101F PR PT FALLS ASSESS DOC 0-1 FALLS W/OUT INJ PAST YR: ICD-10-PCS | Mod: CPTII,S$GLB,, | Performed by: PODIATRIST

## 2021-05-27 PROCEDURE — 99999 PR PBB SHADOW E&M-EST. PATIENT-LVL V: ICD-10-PCS | Mod: PBBFAC,,, | Performed by: PODIATRIST

## 2021-05-27 PROCEDURE — 99999 PR PBB SHADOW E&M-EST. PATIENT-LVL V: CPT | Mod: PBBFAC,,, | Performed by: PODIATRIST

## 2021-05-27 PROCEDURE — 3288F FALL RISK ASSESSMENT DOCD: CPT | Mod: CPTII,S$GLB,, | Performed by: PODIATRIST

## 2021-05-27 PROCEDURE — 99499 UNLISTED E&M SERVICE: CPT | Mod: S$GLB,,, | Performed by: PODIATRIST

## 2021-05-27 PROCEDURE — 3008F BODY MASS INDEX DOCD: CPT | Mod: CPTII,S$GLB,, | Performed by: PODIATRIST

## 2021-05-27 PROCEDURE — 1101F PT FALLS ASSESS-DOCD LE1/YR: CPT | Mod: CPTII,S$GLB,, | Performed by: PODIATRIST

## 2021-05-27 PROCEDURE — 1125F PR PAIN SEVERITY QUANTIFIED, PAIN PRESENT: ICD-10-PCS | Mod: S$GLB,,, | Performed by: PODIATRIST

## 2021-05-27 PROCEDURE — 99499 NO LOS: ICD-10-PCS | Mod: S$GLB,,, | Performed by: PODIATRIST

## 2021-05-27 PROCEDURE — 3008F PR BODY MASS INDEX (BMI) DOCUMENTED: ICD-10-PCS | Mod: CPTII,S$GLB,, | Performed by: PODIATRIST

## 2021-05-27 PROCEDURE — 1125F AMNT PAIN NOTED PAIN PRSNT: CPT | Mod: S$GLB,,, | Performed by: PODIATRIST

## 2021-05-27 NOTE — TELEPHONE ENCOUNTER
Spoke with daughter she would like to un-enroll her dad in the coumadin clinic and would like an order sent to [facility] for home prothrombin time testing so they  can test his inr at home instead of coming in for a lab draw

## 2021-06-03 ENCOUNTER — OFFICE VISIT (OUTPATIENT)
Dept: PODIATRY | Facility: CLINIC | Age: 76
End: 2021-06-03
Payer: MEDICARE

## 2021-06-03 ENCOUNTER — PATIENT OUTREACH (OUTPATIENT)
Dept: ADMINISTRATIVE | Facility: OTHER | Age: 76
End: 2021-06-03

## 2021-06-03 VITALS
DIASTOLIC BLOOD PRESSURE: 66 MMHG | HEART RATE: 60 BPM | HEIGHT: 66 IN | WEIGHT: 284 LBS | BODY MASS INDEX: 45.64 KG/M2 | SYSTOLIC BLOOD PRESSURE: 144 MMHG

## 2021-06-03 DIAGNOSIS — E11.9 TYPE 2 DIABETES MELLITUS WITHOUT COMPLICATION, WITHOUT LONG-TERM CURRENT USE OF INSULIN: Primary | ICD-10-CM

## 2021-06-03 DIAGNOSIS — Z87.898 HISTORY OF ULCERATION: Primary | ICD-10-CM

## 2021-06-03 DIAGNOSIS — E11.49 TYPE II DIABETES MELLITUS WITH NEUROLOGICAL MANIFESTATIONS: ICD-10-CM

## 2021-06-03 PROCEDURE — 1101F PR PT FALLS ASSESS DOC 0-1 FALLS W/OUT INJ PAST YR: ICD-10-PCS | Mod: CPTII,S$GLB,, | Performed by: PODIATRIST

## 2021-06-03 PROCEDURE — 99213 PR OFFICE/OUTPT VISIT, EST, LEVL III, 20-29 MIN: ICD-10-PCS | Mod: S$GLB,,, | Performed by: PODIATRIST

## 2021-06-03 PROCEDURE — 99213 OFFICE O/P EST LOW 20 MIN: CPT | Mod: S$GLB,,, | Performed by: PODIATRIST

## 2021-06-03 PROCEDURE — 3288F PR FALLS RISK ASSESSMENT DOCUMENTED: ICD-10-PCS | Mod: CPTII,S$GLB,, | Performed by: PODIATRIST

## 2021-06-03 PROCEDURE — 1159F PR MEDICATION LIST DOCUMENTED IN MEDICAL RECORD: ICD-10-PCS | Mod: S$GLB,,, | Performed by: PODIATRIST

## 2021-06-03 PROCEDURE — 1159F MED LIST DOCD IN RCRD: CPT | Mod: S$GLB,,, | Performed by: PODIATRIST

## 2021-06-03 PROCEDURE — 1125F AMNT PAIN NOTED PAIN PRSNT: CPT | Mod: S$GLB,,, | Performed by: PODIATRIST

## 2021-06-03 PROCEDURE — 1101F PT FALLS ASSESS-DOCD LE1/YR: CPT | Mod: CPTII,S$GLB,, | Performed by: PODIATRIST

## 2021-06-03 PROCEDURE — 99999 PR PBB SHADOW E&M-EST. PATIENT-LVL IV: ICD-10-PCS | Mod: PBBFAC,,, | Performed by: PODIATRIST

## 2021-06-03 PROCEDURE — 3288F FALL RISK ASSESSMENT DOCD: CPT | Mod: CPTII,S$GLB,, | Performed by: PODIATRIST

## 2021-06-03 PROCEDURE — 99999 PR PBB SHADOW E&M-EST. PATIENT-LVL IV: CPT | Mod: PBBFAC,,, | Performed by: PODIATRIST

## 2021-06-03 PROCEDURE — 3008F BODY MASS INDEX DOCD: CPT | Mod: CPTII,S$GLB,, | Performed by: PODIATRIST

## 2021-06-03 PROCEDURE — 1125F PR PAIN SEVERITY QUANTIFIED, PAIN PRESENT: ICD-10-PCS | Mod: S$GLB,,, | Performed by: PODIATRIST

## 2021-06-03 PROCEDURE — 3008F PR BODY MASS INDEX (BMI) DOCUMENTED: ICD-10-PCS | Mod: CPTII,S$GLB,, | Performed by: PODIATRIST

## 2021-06-11 ENCOUNTER — TELEPHONE (OUTPATIENT)
Dept: FAMILY MEDICINE | Facility: CLINIC | Age: 76
End: 2021-06-11

## 2021-06-11 ENCOUNTER — TELEPHONE (OUTPATIENT)
Dept: HEMATOLOGY/ONCOLOGY | Facility: CLINIC | Age: 76
End: 2021-06-11

## 2021-06-11 ENCOUNTER — PATIENT MESSAGE (OUTPATIENT)
Dept: FAMILY MEDICINE | Facility: CLINIC | Age: 76
End: 2021-06-11

## 2021-06-11 DIAGNOSIS — I48.0 PAROXYSMAL ATRIAL FIBRILLATION: Primary | ICD-10-CM

## 2021-06-11 RX ORDER — NYSTATIN 100000 [USP'U]/G
POWDER TOPICAL 2 TIMES DAILY
Qty: 60 G | Refills: 11 | Status: SHIPPED | OUTPATIENT
Start: 2021-06-11 | End: 2021-11-20 | Stop reason: SDUPTHER

## 2021-06-15 ENCOUNTER — TELEPHONE (OUTPATIENT)
Dept: FAMILY MEDICINE | Facility: CLINIC | Age: 76
End: 2021-06-15

## 2021-06-17 ENCOUNTER — OFFICE VISIT (OUTPATIENT)
Dept: PODIATRY | Facility: CLINIC | Age: 76
End: 2021-06-17
Payer: MEDICARE

## 2021-06-17 VITALS
BODY MASS INDEX: 45.64 KG/M2 | SYSTOLIC BLOOD PRESSURE: 155 MMHG | DIASTOLIC BLOOD PRESSURE: 66 MMHG | HEIGHT: 66 IN | HEART RATE: 50 BPM | WEIGHT: 284 LBS

## 2021-06-17 DIAGNOSIS — E11.49 TYPE II DIABETES MELLITUS WITH NEUROLOGICAL MANIFESTATIONS: Primary | ICD-10-CM

## 2021-06-17 DIAGNOSIS — L97.522 ULCER OF TOE, LEFT, WITH FAT LAYER EXPOSED: ICD-10-CM

## 2021-06-17 PROCEDURE — 3288F FALL RISK ASSESSMENT DOCD: CPT | Mod: CPTII,S$GLB,, | Performed by: PODIATRIST

## 2021-06-17 PROCEDURE — 11042 DBRDMT SUBQ TIS 1ST 20SQCM/<: CPT | Mod: S$GLB,,, | Performed by: PODIATRIST

## 2021-06-17 PROCEDURE — 99999 PR PBB SHADOW E&M-EST. PATIENT-LVL III: CPT | Mod: PBBFAC,,, | Performed by: PODIATRIST

## 2021-06-17 PROCEDURE — 1125F AMNT PAIN NOTED PAIN PRSNT: CPT | Mod: S$GLB,,, | Performed by: PODIATRIST

## 2021-06-17 PROCEDURE — 3008F BODY MASS INDEX DOCD: CPT | Mod: CPTII,S$GLB,, | Performed by: PODIATRIST

## 2021-06-17 PROCEDURE — 3288F PR FALLS RISK ASSESSMENT DOCUMENTED: ICD-10-PCS | Mod: CPTII,S$GLB,, | Performed by: PODIATRIST

## 2021-06-17 PROCEDURE — 1101F PR PT FALLS ASSESS DOC 0-1 FALLS W/OUT INJ PAST YR: ICD-10-PCS | Mod: CPTII,S$GLB,, | Performed by: PODIATRIST

## 2021-06-17 PROCEDURE — 1101F PT FALLS ASSESS-DOCD LE1/YR: CPT | Mod: CPTII,S$GLB,, | Performed by: PODIATRIST

## 2021-06-17 PROCEDURE — 99499 UNLISTED E&M SERVICE: CPT | Mod: S$GLB,,, | Performed by: PODIATRIST

## 2021-06-17 PROCEDURE — 3008F PR BODY MASS INDEX (BMI) DOCUMENTED: ICD-10-PCS | Mod: CPTII,S$GLB,, | Performed by: PODIATRIST

## 2021-06-17 PROCEDURE — 99499 NO LOS: ICD-10-PCS | Mod: S$GLB,,, | Performed by: PODIATRIST

## 2021-06-17 PROCEDURE — 99999 PR PBB SHADOW E&M-EST. PATIENT-LVL III: ICD-10-PCS | Mod: PBBFAC,,, | Performed by: PODIATRIST

## 2021-06-17 PROCEDURE — 11042 WOUND DEBRIDEMENT: ICD-10-PCS | Mod: S$GLB,,, | Performed by: PODIATRIST

## 2021-06-17 PROCEDURE — 1125F PR PAIN SEVERITY QUANTIFIED, PAIN PRESENT: ICD-10-PCS | Mod: S$GLB,,, | Performed by: PODIATRIST

## 2021-06-24 DIAGNOSIS — D50.9 IRON DEFICIENCY ANEMIA: ICD-10-CM

## 2021-06-24 DIAGNOSIS — I10 ESSENTIAL HYPERTENSION: ICD-10-CM

## 2021-06-24 RX ORDER — FERROUS SULFATE TAB 325 MG (65 MG ELEMENTAL FE) 325 (65 FE) MG
TAB ORAL
Qty: 180 TABLET | Refills: 1 | Status: SHIPPED | OUTPATIENT
Start: 2021-06-24 | End: 2021-12-27 | Stop reason: SDUPTHER

## 2021-06-24 RX ORDER — LANOLIN ALCOHOL/MO/W.PET/CERES
CREAM (GRAM) TOPICAL
Qty: 90 TABLET | Refills: 3 | Status: SHIPPED | OUTPATIENT
Start: 2021-06-24 | End: 2021-12-27 | Stop reason: SDUPTHER

## 2021-06-29 ENCOUNTER — OFFICE VISIT (OUTPATIENT)
Dept: PODIATRY | Facility: CLINIC | Age: 76
End: 2021-06-29
Payer: MEDICARE

## 2021-06-29 VITALS
DIASTOLIC BLOOD PRESSURE: 70 MMHG | BODY MASS INDEX: 44.2 KG/M2 | SYSTOLIC BLOOD PRESSURE: 158 MMHG | WEIGHT: 275 LBS | HEART RATE: 53 BPM | HEIGHT: 66 IN

## 2021-06-29 DIAGNOSIS — M79.671 FOOT PAIN, RIGHT: Primary | ICD-10-CM

## 2021-06-29 DIAGNOSIS — L97.522 ULCER OF TOE, LEFT, WITH FAT LAYER EXPOSED: ICD-10-CM

## 2021-06-29 DIAGNOSIS — E11.49 TYPE II DIABETES MELLITUS WITH NEUROLOGICAL MANIFESTATIONS: ICD-10-CM

## 2021-06-29 PROCEDURE — 11042 DBRDMT SUBQ TIS 1ST 20SQCM/<: CPT | Mod: S$GLB,,, | Performed by: PODIATRIST

## 2021-06-29 PROCEDURE — 1126F PR PAIN SEVERITY QUANTIFIED, NO PAIN PRESENT: ICD-10-PCS | Mod: S$GLB,,, | Performed by: PODIATRIST

## 2021-06-29 PROCEDURE — 99499 NO LOS: ICD-10-PCS | Mod: S$GLB,,, | Performed by: PODIATRIST

## 2021-06-29 PROCEDURE — 99999 PR PBB SHADOW E&M-EST. PATIENT-LVL II: CPT | Mod: PBBFAC,,, | Performed by: PODIATRIST

## 2021-06-29 PROCEDURE — 1101F PT FALLS ASSESS-DOCD LE1/YR: CPT | Mod: CPTII,S$GLB,, | Performed by: PODIATRIST

## 2021-06-29 PROCEDURE — 3288F FALL RISK ASSESSMENT DOCD: CPT | Mod: CPTII,S$GLB,, | Performed by: PODIATRIST

## 2021-06-29 PROCEDURE — 11042 WOUND DEBRIDEMENT: ICD-10-PCS | Mod: S$GLB,,, | Performed by: PODIATRIST

## 2021-06-29 PROCEDURE — 99499 UNLISTED E&M SERVICE: CPT | Mod: S$GLB,,, | Performed by: PODIATRIST

## 2021-06-29 PROCEDURE — 3008F BODY MASS INDEX DOCD: CPT | Mod: CPTII,S$GLB,, | Performed by: PODIATRIST

## 2021-06-29 PROCEDURE — 1126F AMNT PAIN NOTED NONE PRSNT: CPT | Mod: S$GLB,,, | Performed by: PODIATRIST

## 2021-06-29 PROCEDURE — 3008F PR BODY MASS INDEX (BMI) DOCUMENTED: ICD-10-PCS | Mod: CPTII,S$GLB,, | Performed by: PODIATRIST

## 2021-06-29 PROCEDURE — 99999 PR PBB SHADOW E&M-EST. PATIENT-LVL II: ICD-10-PCS | Mod: PBBFAC,,, | Performed by: PODIATRIST

## 2021-06-29 PROCEDURE — 3288F PR FALLS RISK ASSESSMENT DOCUMENTED: ICD-10-PCS | Mod: CPTII,S$GLB,, | Performed by: PODIATRIST

## 2021-06-29 PROCEDURE — 1101F PR PT FALLS ASSESS DOC 0-1 FALLS W/OUT INJ PAST YR: ICD-10-PCS | Mod: CPTII,S$GLB,, | Performed by: PODIATRIST

## 2021-07-06 ENCOUNTER — PATIENT OUTREACH (OUTPATIENT)
Dept: ADMINISTRATIVE | Facility: OTHER | Age: 76
End: 2021-07-06

## 2021-07-06 ENCOUNTER — OFFICE VISIT (OUTPATIENT)
Dept: PODIATRY | Facility: CLINIC | Age: 76
End: 2021-07-06
Payer: MEDICARE

## 2021-07-06 VITALS
DIASTOLIC BLOOD PRESSURE: 53 MMHG | HEART RATE: 55 BPM | SYSTOLIC BLOOD PRESSURE: 122 MMHG | BODY MASS INDEX: 44.2 KG/M2 | WEIGHT: 275 LBS | HEIGHT: 66 IN

## 2021-07-06 DIAGNOSIS — Z87.898 HISTORY OF ULCERATION: ICD-10-CM

## 2021-07-06 DIAGNOSIS — E11.49 TYPE II DIABETES MELLITUS WITH NEUROLOGICAL MANIFESTATIONS: Primary | ICD-10-CM

## 2021-07-06 PROCEDURE — 3288F FALL RISK ASSESSMENT DOCD: CPT | Mod: CPTII,S$GLB,, | Performed by: PODIATRIST

## 2021-07-06 PROCEDURE — 3288F PR FALLS RISK ASSESSMENT DOCUMENTED: ICD-10-PCS | Mod: CPTII,S$GLB,, | Performed by: PODIATRIST

## 2021-07-06 PROCEDURE — 99999 PR PBB SHADOW E&M-EST. PATIENT-LVL III: CPT | Mod: PBBFAC,,, | Performed by: PODIATRIST

## 2021-07-06 PROCEDURE — 1101F PR PT FALLS ASSESS DOC 0-1 FALLS W/OUT INJ PAST YR: ICD-10-PCS | Mod: CPTII,S$GLB,, | Performed by: PODIATRIST

## 2021-07-06 PROCEDURE — 1101F PT FALLS ASSESS-DOCD LE1/YR: CPT | Mod: CPTII,S$GLB,, | Performed by: PODIATRIST

## 2021-07-06 PROCEDURE — 1159F PR MEDICATION LIST DOCUMENTED IN MEDICAL RECORD: ICD-10-PCS | Mod: S$GLB,,, | Performed by: PODIATRIST

## 2021-07-06 PROCEDURE — 3008F PR BODY MASS INDEX (BMI) DOCUMENTED: ICD-10-PCS | Mod: CPTII,S$GLB,, | Performed by: PODIATRIST

## 2021-07-06 PROCEDURE — 1126F AMNT PAIN NOTED NONE PRSNT: CPT | Mod: S$GLB,,, | Performed by: PODIATRIST

## 2021-07-06 PROCEDURE — 99213 OFFICE O/P EST LOW 20 MIN: CPT | Mod: S$GLB,,, | Performed by: PODIATRIST

## 2021-07-06 PROCEDURE — 99499 RISK ADDL DX/OHS AUDIT: ICD-10-PCS | Mod: S$GLB,,, | Performed by: PODIATRIST

## 2021-07-06 PROCEDURE — 1159F MED LIST DOCD IN RCRD: CPT | Mod: S$GLB,,, | Performed by: PODIATRIST

## 2021-07-06 PROCEDURE — 3008F BODY MASS INDEX DOCD: CPT | Mod: CPTII,S$GLB,, | Performed by: PODIATRIST

## 2021-07-06 PROCEDURE — 99999 PR PBB SHADOW E&M-EST. PATIENT-LVL III: ICD-10-PCS | Mod: PBBFAC,,, | Performed by: PODIATRIST

## 2021-07-06 PROCEDURE — 99213 PR OFFICE/OUTPT VISIT, EST, LEVL III, 20-29 MIN: ICD-10-PCS | Mod: S$GLB,,, | Performed by: PODIATRIST

## 2021-07-06 PROCEDURE — 1126F PR PAIN SEVERITY QUANTIFIED, NO PAIN PRESENT: ICD-10-PCS | Mod: S$GLB,,, | Performed by: PODIATRIST

## 2021-07-06 PROCEDURE — 99499 UNLISTED E&M SERVICE: CPT | Mod: S$GLB,,, | Performed by: PODIATRIST

## 2021-07-07 ENCOUNTER — PATIENT MESSAGE (OUTPATIENT)
Dept: ADMINISTRATIVE | Facility: HOSPITAL | Age: 76
End: 2021-07-07

## 2021-07-13 ENCOUNTER — OFFICE VISIT (OUTPATIENT)
Dept: PODIATRY | Facility: CLINIC | Age: 76
End: 2021-07-13
Payer: MEDICARE

## 2021-07-13 VITALS
BODY MASS INDEX: 44.2 KG/M2 | HEART RATE: 61 BPM | DIASTOLIC BLOOD PRESSURE: 60 MMHG | HEIGHT: 66 IN | SYSTOLIC BLOOD PRESSURE: 131 MMHG | WEIGHT: 275 LBS

## 2021-07-13 DIAGNOSIS — Z87.898 HISTORY OF ULCERATION: ICD-10-CM

## 2021-07-13 DIAGNOSIS — E11.49 TYPE II DIABETES MELLITUS WITH NEUROLOGICAL MANIFESTATIONS: Primary | ICD-10-CM

## 2021-07-13 PROCEDURE — 99213 OFFICE O/P EST LOW 20 MIN: CPT | Mod: S$GLB,,, | Performed by: PODIATRIST

## 2021-07-13 PROCEDURE — 1125F PR PAIN SEVERITY QUANTIFIED, PAIN PRESENT: ICD-10-PCS | Mod: S$GLB,,, | Performed by: PODIATRIST

## 2021-07-13 PROCEDURE — 1159F MED LIST DOCD IN RCRD: CPT | Mod: S$GLB,,, | Performed by: PODIATRIST

## 2021-07-13 PROCEDURE — 3008F BODY MASS INDEX DOCD: CPT | Mod: CPTII,S$GLB,, | Performed by: PODIATRIST

## 2021-07-13 PROCEDURE — 99213 PR OFFICE/OUTPT VISIT, EST, LEVL III, 20-29 MIN: ICD-10-PCS | Mod: S$GLB,,, | Performed by: PODIATRIST

## 2021-07-13 PROCEDURE — 1125F AMNT PAIN NOTED PAIN PRSNT: CPT | Mod: S$GLB,,, | Performed by: PODIATRIST

## 2021-07-13 PROCEDURE — 1159F PR MEDICATION LIST DOCUMENTED IN MEDICAL RECORD: ICD-10-PCS | Mod: S$GLB,,, | Performed by: PODIATRIST

## 2021-07-13 PROCEDURE — 99999 PR PBB SHADOW E&M-EST. PATIENT-LVL IV: CPT | Mod: PBBFAC,,, | Performed by: PODIATRIST

## 2021-07-13 PROCEDURE — 1101F PR PT FALLS ASSESS DOC 0-1 FALLS W/OUT INJ PAST YR: ICD-10-PCS | Mod: CPTII,S$GLB,, | Performed by: PODIATRIST

## 2021-07-13 PROCEDURE — 3288F FALL RISK ASSESSMENT DOCD: CPT | Mod: CPTII,S$GLB,, | Performed by: PODIATRIST

## 2021-07-13 PROCEDURE — 3288F PR FALLS RISK ASSESSMENT DOCUMENTED: ICD-10-PCS | Mod: CPTII,S$GLB,, | Performed by: PODIATRIST

## 2021-07-13 PROCEDURE — 1101F PT FALLS ASSESS-DOCD LE1/YR: CPT | Mod: CPTII,S$GLB,, | Performed by: PODIATRIST

## 2021-07-13 PROCEDURE — 3008F PR BODY MASS INDEX (BMI) DOCUMENTED: ICD-10-PCS | Mod: CPTII,S$GLB,, | Performed by: PODIATRIST

## 2021-07-13 PROCEDURE — 99999 PR PBB SHADOW E&M-EST. PATIENT-LVL IV: ICD-10-PCS | Mod: PBBFAC,,, | Performed by: PODIATRIST

## 2021-07-21 ENCOUNTER — OFFICE VISIT (OUTPATIENT)
Dept: PODIATRY | Facility: CLINIC | Age: 76
End: 2021-07-21
Payer: MEDICARE

## 2021-07-21 VITALS
HEART RATE: 61 BPM | DIASTOLIC BLOOD PRESSURE: 65 MMHG | BODY MASS INDEX: 44.2 KG/M2 | WEIGHT: 275 LBS | HEIGHT: 66 IN | SYSTOLIC BLOOD PRESSURE: 138 MMHG

## 2021-07-21 DIAGNOSIS — E11.49 TYPE II DIABETES MELLITUS WITH NEUROLOGICAL MANIFESTATIONS: Primary | ICD-10-CM

## 2021-07-21 DIAGNOSIS — Z87.898 HISTORY OF ULCERATION: ICD-10-CM

## 2021-07-21 PROCEDURE — 3078F PR MOST RECENT DIASTOLIC BLOOD PRESSURE < 80 MM HG: ICD-10-PCS | Mod: CPTII,S$GLB,, | Performed by: PODIATRIST

## 2021-07-21 PROCEDURE — 1101F PR PT FALLS ASSESS DOC 0-1 FALLS W/OUT INJ PAST YR: ICD-10-PCS | Mod: CPTII,S$GLB,, | Performed by: PODIATRIST

## 2021-07-21 PROCEDURE — 1101F PT FALLS ASSESS-DOCD LE1/YR: CPT | Mod: CPTII,S$GLB,, | Performed by: PODIATRIST

## 2021-07-21 PROCEDURE — 1159F MED LIST DOCD IN RCRD: CPT | Mod: CPTII,S$GLB,, | Performed by: PODIATRIST

## 2021-07-21 PROCEDURE — 3288F PR FALLS RISK ASSESSMENT DOCUMENTED: ICD-10-PCS | Mod: CPTII,S$GLB,, | Performed by: PODIATRIST

## 2021-07-21 PROCEDURE — 1125F AMNT PAIN NOTED PAIN PRSNT: CPT | Mod: CPTII,S$GLB,, | Performed by: PODIATRIST

## 2021-07-21 PROCEDURE — 3075F PR MOST RECENT SYSTOLIC BLOOD PRESS GE 130-139MM HG: ICD-10-PCS | Mod: CPTII,S$GLB,, | Performed by: PODIATRIST

## 2021-07-21 PROCEDURE — 99213 OFFICE O/P EST LOW 20 MIN: CPT | Mod: S$GLB,,, | Performed by: PODIATRIST

## 2021-07-21 PROCEDURE — 99213 PR OFFICE/OUTPT VISIT, EST, LEVL III, 20-29 MIN: ICD-10-PCS | Mod: S$GLB,,, | Performed by: PODIATRIST

## 2021-07-21 PROCEDURE — 99999 PR PBB SHADOW E&M-EST. PATIENT-LVL IV: ICD-10-PCS | Mod: PBBFAC,,, | Performed by: PODIATRIST

## 2021-07-21 PROCEDURE — 3008F BODY MASS INDEX DOCD: CPT | Mod: CPTII,S$GLB,, | Performed by: PODIATRIST

## 2021-07-21 PROCEDURE — 1159F PR MEDICATION LIST DOCUMENTED IN MEDICAL RECORD: ICD-10-PCS | Mod: CPTII,S$GLB,, | Performed by: PODIATRIST

## 2021-07-21 PROCEDURE — 99999 PR PBB SHADOW E&M-EST. PATIENT-LVL IV: CPT | Mod: PBBFAC,,, | Performed by: PODIATRIST

## 2021-07-21 PROCEDURE — 3008F PR BODY MASS INDEX (BMI) DOCUMENTED: ICD-10-PCS | Mod: CPTII,S$GLB,, | Performed by: PODIATRIST

## 2021-07-21 PROCEDURE — 1125F PR PAIN SEVERITY QUANTIFIED, PAIN PRESENT: ICD-10-PCS | Mod: CPTII,S$GLB,, | Performed by: PODIATRIST

## 2021-07-21 PROCEDURE — 3075F SYST BP GE 130 - 139MM HG: CPT | Mod: CPTII,S$GLB,, | Performed by: PODIATRIST

## 2021-07-21 PROCEDURE — 3288F FALL RISK ASSESSMENT DOCD: CPT | Mod: CPTII,S$GLB,, | Performed by: PODIATRIST

## 2021-07-21 PROCEDURE — 3078F DIAST BP <80 MM HG: CPT | Mod: CPTII,S$GLB,, | Performed by: PODIATRIST
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
## 2021-07-29 ENCOUNTER — LAB VISIT (OUTPATIENT)
Dept: LAB | Facility: HOSPITAL | Age: 76
End: 2021-07-29
Attending: UROLOGY
Payer: MEDICARE

## 2021-07-29 DIAGNOSIS — C61 PROSTATE CANCER: ICD-10-CM

## 2021-07-29 LAB — COMPLEXED PSA SERPL-MCNC: 0.02 NG/ML (ref 0–4)

## 2021-07-29 PROCEDURE — 36415 COLL VENOUS BLD VENIPUNCTURE: CPT | Performed by: UROLOGY

## 2021-07-29 PROCEDURE — 84153 ASSAY OF PSA TOTAL: CPT | Performed by: UROLOGY

## 2021-07-30 DIAGNOSIS — C61 PROSTATE CANCER: Primary | ICD-10-CM

## 2021-08-04 ENCOUNTER — PATIENT MESSAGE (OUTPATIENT)
Dept: ADMINISTRATIVE | Facility: HOSPITAL | Age: 76
End: 2021-08-04

## 2021-08-06 ENCOUNTER — PATIENT OUTREACH (OUTPATIENT)
Dept: ADMINISTRATIVE | Facility: OTHER | Age: 76
End: 2021-08-06

## 2021-08-09 ENCOUNTER — OFFICE VISIT (OUTPATIENT)
Dept: PAIN MEDICINE | Facility: CLINIC | Age: 76
End: 2021-08-09
Payer: MEDICARE

## 2021-08-09 VITALS
DIASTOLIC BLOOD PRESSURE: 71 MMHG | BODY MASS INDEX: 44.2 KG/M2 | HEIGHT: 66 IN | HEART RATE: 67 BPM | WEIGHT: 275 LBS | SYSTOLIC BLOOD PRESSURE: 123 MMHG

## 2021-08-09 DIAGNOSIS — G89.29 CHRONIC HIP PAIN AFTER TOTAL REPLACEMENT OF RIGHT HIP JOINT: ICD-10-CM

## 2021-08-09 DIAGNOSIS — M17.12 PRIMARY OSTEOARTHRITIS OF LEFT KNEE: ICD-10-CM

## 2021-08-09 DIAGNOSIS — M51.36 DDD (DEGENERATIVE DISC DISEASE), LUMBAR: ICD-10-CM

## 2021-08-09 DIAGNOSIS — Z96.641 CHRONIC HIP PAIN AFTER TOTAL REPLACEMENT OF RIGHT HIP JOINT: ICD-10-CM

## 2021-08-09 DIAGNOSIS — M25.551 CHRONIC HIP PAIN AFTER TOTAL REPLACEMENT OF RIGHT HIP JOINT: ICD-10-CM

## 2021-08-09 DIAGNOSIS — F11.90 CHRONIC, CONTINUOUS USE OF OPIOIDS: Primary | ICD-10-CM

## 2021-08-09 DIAGNOSIS — M47.896 OTHER SPONDYLOSIS, LUMBAR REGION: ICD-10-CM

## 2021-08-09 PROCEDURE — 1125F PR PAIN SEVERITY QUANTIFIED, PAIN PRESENT: ICD-10-PCS | Mod: CPTII,S$GLB,, | Performed by: PHYSICIAN ASSISTANT

## 2021-08-09 PROCEDURE — 3074F PR MOST RECENT SYSTOLIC BLOOD PRESSURE < 130 MM HG: ICD-10-PCS | Mod: CPTII,S$GLB,, | Performed by: PHYSICIAN ASSISTANT

## 2021-08-09 PROCEDURE — 3288F FALL RISK ASSESSMENT DOCD: CPT | Mod: CPTII,S$GLB,, | Performed by: PHYSICIAN ASSISTANT

## 2021-08-09 PROCEDURE — 1101F PT FALLS ASSESS-DOCD LE1/YR: CPT | Mod: CPTII,S$GLB,, | Performed by: PHYSICIAN ASSISTANT

## 2021-08-09 PROCEDURE — 1159F PR MEDICATION LIST DOCUMENTED IN MEDICAL RECORD: ICD-10-PCS | Mod: CPTII,S$GLB,, | Performed by: PHYSICIAN ASSISTANT

## 2021-08-09 PROCEDURE — 99214 PR OFFICE/OUTPT VISIT, EST, LEVL IV, 30-39 MIN: ICD-10-PCS | Mod: S$GLB,,, | Performed by: PHYSICIAN ASSISTANT

## 2021-08-09 PROCEDURE — 3008F PR BODY MASS INDEX (BMI) DOCUMENTED: ICD-10-PCS | Mod: CPTII,S$GLB,, | Performed by: PHYSICIAN ASSISTANT

## 2021-08-09 PROCEDURE — 3078F DIAST BP <80 MM HG: CPT | Mod: CPTII,S$GLB,, | Performed by: PHYSICIAN ASSISTANT

## 2021-08-09 PROCEDURE — 80307 DRUG TEST PRSMV CHEM ANLYZR: CPT | Performed by: PHYSICIAN ASSISTANT

## 2021-08-09 PROCEDURE — 1101F PR PT FALLS ASSESS DOC 0-1 FALLS W/OUT INJ PAST YR: ICD-10-PCS | Mod: CPTII,S$GLB,, | Performed by: PHYSICIAN ASSISTANT

## 2021-08-09 PROCEDURE — 3078F PR MOST RECENT DIASTOLIC BLOOD PRESSURE < 80 MM HG: ICD-10-PCS | Mod: CPTII,S$GLB,, | Performed by: PHYSICIAN ASSISTANT

## 2021-08-09 PROCEDURE — 3008F BODY MASS INDEX DOCD: CPT | Mod: CPTII,S$GLB,, | Performed by: PHYSICIAN ASSISTANT

## 2021-08-09 PROCEDURE — 3074F SYST BP LT 130 MM HG: CPT | Mod: CPTII,S$GLB,, | Performed by: PHYSICIAN ASSISTANT

## 2021-08-09 PROCEDURE — 3288F PR FALLS RISK ASSESSMENT DOCUMENTED: ICD-10-PCS | Mod: CPTII,S$GLB,, | Performed by: PHYSICIAN ASSISTANT

## 2021-08-09 PROCEDURE — 99999 PR PBB SHADOW E&M-EST. PATIENT-LVL IV: ICD-10-PCS | Mod: PBBFAC,,, | Performed by: PHYSICIAN ASSISTANT

## 2021-08-09 PROCEDURE — 1125F AMNT PAIN NOTED PAIN PRSNT: CPT | Mod: CPTII,S$GLB,, | Performed by: PHYSICIAN ASSISTANT

## 2021-08-09 PROCEDURE — 99999 PR PBB SHADOW E&M-EST. PATIENT-LVL IV: CPT | Mod: PBBFAC,,, | Performed by: PHYSICIAN ASSISTANT

## 2021-08-09 PROCEDURE — 1159F MED LIST DOCD IN RCRD: CPT | Mod: CPTII,S$GLB,, | Performed by: PHYSICIAN ASSISTANT

## 2021-08-09 PROCEDURE — 99214 OFFICE O/P EST MOD 30 MIN: CPT | Mod: S$GLB,,, | Performed by: PHYSICIAN ASSISTANT

## 2021-08-11 NOTE — INTERVAL H&P NOTE
8/11/2021  10:30am  Trying to call pt to notified that she needs to come back for add views, number isn't going through it says number error f.q.   The patient has been examined and the H&P has been reviewed:    I concur with the findings and no changes have occurred since H&P was written.   This patient has been cleared for surgery in an ambulatory surgical facility    ASA 3,  MP3  No history of anesthetic complications  Plan for RN IV sedation      Anesthesia/Surgery risks, benefits and alternative options discussed and understood by patient/family.          Active Hospital Problems    Diagnosis  POA    Facet arthropathy [M12.88]  Yes      Resolved Hospital Problems    Diagnosis Date Resolved POA   No resolved problems to display.

## 2021-08-15 LAB
6MAM UR QL: NOT DETECTED
7AMINOCLONAZEPAM UR QL: NOT DETECTED
A-OH ALPRAZ UR QL: NOT DETECTED
ALPHA-OH-MIDAZOLAM: NOT DETECTED
ALPRAZ UR QL: NOT DETECTED
AMPHET UR QL SCN: NOT DETECTED
ANNOTATION COMMENT IMP: NORMAL
ANNOTATION COMMENT IMP: NORMAL
BARBITURATES UR QL: NOT DETECTED
BUPRENORPHINE UR QL: NOT DETECTED
BZE UR QL: NOT DETECTED
CARBOXYTHC UR QL: NOT DETECTED
CARISOPRODOL UR QL: NOT DETECTED
CLONAZEPAM UR QL: NOT DETECTED
CODEINE UR QL: NOT DETECTED
CREAT UR-MCNC: 46.9 MG/DL (ref 20–400)
DIAZEPAM UR QL: NOT DETECTED
ETHYL GLUCURONIDE UR QL: NOT DETECTED
FENTANYL UR QL: NOT DETECTED
GABAPENTIN: NOT DETECTED
HYDROCODONE UR QL: PRESENT
HYDROMORPHONE UR QL: NOT DETECTED
LORAZEPAM UR QL: NOT DETECTED
MDA UR QL: NOT DETECTED
MDEA UR QL: NOT DETECTED
MDMA UR QL: NOT DETECTED
ME-PHENIDATE UR QL: NOT DETECTED
METHADONE UR QL: NOT DETECTED
METHAMPHET UR QL: NOT DETECTED
MIDAZOLAM UR QL SCN: NOT DETECTED
MORPHINE UR QL: NOT DETECTED
NALOXONE: NOT DETECTED
NORBUPRENORPHINE UR QL CFM: NOT DETECTED
NORDIAZEPAM UR QL: NOT DETECTED
NORFENTANYL UR QL: NOT DETECTED
NORHYDROCODONE UR QL CFM: NOT DETECTED
NORMEPERIDINE UR QL CFM: NOT DETECTED
NOROXYCODONE UR QL CFM: NOT DETECTED
NOROXYMORPHONE UR QL SCN: NOT DETECTED
OXAZEPAM UR QL: NOT DETECTED
OXYCODONE UR QL: NOT DETECTED
OXYMORPHONE UR QL: NOT DETECTED
PATHOLOGY STUDY: NORMAL
PCP UR QL: NOT DETECTED
PHENTERMINE UR QL: NOT DETECTED
PREGABALIN: NOT DETECTED
SERVICE CMNT-IMP: NORMAL
TAPENTADOL UR QL SCN: NOT DETECTED
TAPENTADOL-O-SULF: NOT DETECTED
TEMAZEPAM UR QL: NOT DETECTED
TRAMADOL UR QL: NOT DETECTED
ZOLPIDEM METABOLITE: NOT DETECTED
ZOLPIDEM UR QL: NOT DETECTED

## 2021-08-16 ENCOUNTER — PATIENT MESSAGE (OUTPATIENT)
Dept: PAIN MEDICINE | Facility: CLINIC | Age: 76
End: 2021-08-16

## 2021-08-16 RX ORDER — HYDROCODONE BITARTRATE AND ACETAMINOPHEN 5; 325 MG/1; MG/1
1 TABLET ORAL EVERY 8 HOURS PRN
Qty: 90 TABLET | Refills: 0 | Status: SHIPPED | OUTPATIENT
Start: 2021-08-16 | End: 2021-09-19 | Stop reason: SDUPTHER

## 2021-08-17 ENCOUNTER — PATIENT MESSAGE (OUTPATIENT)
Dept: ADMINISTRATIVE | Facility: HOSPITAL | Age: 76
End: 2021-08-17

## 2021-08-25 DIAGNOSIS — E11.9 TYPE 2 DIABETES MELLITUS WITHOUT COMPLICATION, UNSPECIFIED WHETHER LONG TERM INSULIN USE: ICD-10-CM

## 2021-09-08 ENCOUNTER — PATIENT OUTREACH (OUTPATIENT)
Dept: ADMINISTRATIVE | Facility: HOSPITAL | Age: 76
End: 2021-09-08

## 2021-09-13 ENCOUNTER — PATIENT MESSAGE (OUTPATIENT)
Dept: FAMILY MEDICINE | Facility: CLINIC | Age: 76
End: 2021-09-13

## 2021-09-13 DIAGNOSIS — K62.5 RECTAL BLEEDING: Primary | ICD-10-CM

## 2021-09-14 ENCOUNTER — TELEPHONE (OUTPATIENT)
Dept: FAMILY MEDICINE | Facility: CLINIC | Age: 76
End: 2021-09-14

## 2021-09-16 ENCOUNTER — OFFICE VISIT (OUTPATIENT)
Dept: PODIATRY | Facility: CLINIC | Age: 76
End: 2021-09-16
Payer: MEDICARE

## 2021-09-16 VITALS
HEART RATE: 51 BPM | WEIGHT: 280 LBS | HEIGHT: 66 IN | BODY MASS INDEX: 45 KG/M2 | DIASTOLIC BLOOD PRESSURE: 67 MMHG | SYSTOLIC BLOOD PRESSURE: 135 MMHG

## 2021-09-16 DIAGNOSIS — E11.49 TYPE II DIABETES MELLITUS WITH NEUROLOGICAL MANIFESTATIONS: Primary | ICD-10-CM

## 2021-09-16 DIAGNOSIS — Z87.898 HISTORY OF ULCERATION: ICD-10-CM

## 2021-09-16 DIAGNOSIS — B35.1 ONYCHOMYCOSIS DUE TO DERMATOPHYTE: ICD-10-CM

## 2021-09-16 PROCEDURE — 3078F DIAST BP <80 MM HG: CPT | Mod: CPTII,S$GLB,, | Performed by: PODIATRIST

## 2021-09-16 PROCEDURE — 1159F PR MEDICATION LIST DOCUMENTED IN MEDICAL RECORD: ICD-10-PCS | Mod: CPTII,S$GLB,, | Performed by: PODIATRIST

## 2021-09-16 PROCEDURE — 1101F PT FALLS ASSESS-DOCD LE1/YR: CPT | Mod: CPTII,S$GLB,, | Performed by: PODIATRIST

## 2021-09-16 PROCEDURE — 1125F AMNT PAIN NOTED PAIN PRSNT: CPT | Mod: CPTII,S$GLB,, | Performed by: PODIATRIST

## 2021-09-16 PROCEDURE — 1160F RVW MEDS BY RX/DR IN RCRD: CPT | Mod: CPTII,S$GLB,, | Performed by: PODIATRIST

## 2021-09-16 PROCEDURE — 4010F ACE/ARB THERAPY RXD/TAKEN: CPT | Mod: CPTII,S$GLB,, | Performed by: PODIATRIST

## 2021-09-16 PROCEDURE — 99999 PR PBB SHADOW E&M-EST. PATIENT-LVL V: ICD-10-PCS | Mod: PBBFAC,,, | Performed by: PODIATRIST

## 2021-09-16 PROCEDURE — 3044F HG A1C LEVEL LT 7.0%: CPT | Mod: CPTII,S$GLB,, | Performed by: PODIATRIST

## 2021-09-16 PROCEDURE — 11721 DEBRIDE NAIL 6 OR MORE: CPT | Mod: Q9,S$GLB,, | Performed by: PODIATRIST

## 2021-09-16 PROCEDURE — 99999 PR PBB SHADOW E&M-EST. PATIENT-LVL V: CPT | Mod: PBBFAC,,, | Performed by: PODIATRIST

## 2021-09-16 PROCEDURE — 1101F PR PT FALLS ASSESS DOC 0-1 FALLS W/OUT INJ PAST YR: ICD-10-PCS | Mod: CPTII,S$GLB,, | Performed by: PODIATRIST

## 2021-09-16 PROCEDURE — 3075F PR MOST RECENT SYSTOLIC BLOOD PRESS GE 130-139MM HG: ICD-10-PCS | Mod: CPTII,S$GLB,, | Performed by: PODIATRIST

## 2021-09-16 PROCEDURE — 11721 PR DEBRIDEMENT OF NAILS, 6 OR MORE: ICD-10-PCS | Mod: Q9,S$GLB,, | Performed by: PODIATRIST

## 2021-09-16 PROCEDURE — 99499 NO LOS: ICD-10-PCS | Mod: S$GLB,,, | Performed by: PODIATRIST

## 2021-09-16 PROCEDURE — 3008F PR BODY MASS INDEX (BMI) DOCUMENTED: ICD-10-PCS | Mod: CPTII,S$GLB,, | Performed by: PODIATRIST

## 2021-09-16 PROCEDURE — 3288F PR FALLS RISK ASSESSMENT DOCUMENTED: ICD-10-PCS | Mod: CPTII,S$GLB,, | Performed by: PODIATRIST

## 2021-09-16 PROCEDURE — 4010F PR ACE/ARB THEARPY RXD/TAKEN: ICD-10-PCS | Mod: CPTII,S$GLB,, | Performed by: PODIATRIST

## 2021-09-16 PROCEDURE — 3078F PR MOST RECENT DIASTOLIC BLOOD PRESSURE < 80 MM HG: ICD-10-PCS | Mod: CPTII,S$GLB,, | Performed by: PODIATRIST

## 2021-09-16 PROCEDURE — 3075F SYST BP GE 130 - 139MM HG: CPT | Mod: CPTII,S$GLB,, | Performed by: PODIATRIST

## 2021-09-16 PROCEDURE — 99499 UNLISTED E&M SERVICE: CPT | Mod: S$GLB,,, | Performed by: PODIATRIST

## 2021-09-16 PROCEDURE — 1159F MED LIST DOCD IN RCRD: CPT | Mod: CPTII,S$GLB,, | Performed by: PODIATRIST

## 2021-09-16 PROCEDURE — 3044F PR MOST RECENT HEMOGLOBIN A1C LEVEL <7.0%: ICD-10-PCS | Mod: CPTII,S$GLB,, | Performed by: PODIATRIST

## 2021-09-16 PROCEDURE — 1160F PR REVIEW ALL MEDS BY PRESCRIBER/CLIN PHARMACIST DOCUMENTED: ICD-10-PCS | Mod: CPTII,S$GLB,, | Performed by: PODIATRIST

## 2021-09-16 PROCEDURE — 3008F BODY MASS INDEX DOCD: CPT | Mod: CPTII,S$GLB,, | Performed by: PODIATRIST

## 2021-09-16 PROCEDURE — 1125F PR PAIN SEVERITY QUANTIFIED, PAIN PRESENT: ICD-10-PCS | Mod: CPTII,S$GLB,, | Performed by: PODIATRIST

## 2021-09-16 PROCEDURE — 3288F FALL RISK ASSESSMENT DOCD: CPT | Mod: CPTII,S$GLB,, | Performed by: PODIATRIST

## 2021-09-20 ENCOUNTER — PATIENT MESSAGE (OUTPATIENT)
Dept: PODIATRY | Facility: CLINIC | Age: 76
End: 2021-09-20

## 2021-09-20 ENCOUNTER — TELEPHONE (OUTPATIENT)
Dept: PODIATRY | Facility: CLINIC | Age: 76
End: 2021-09-20

## 2021-09-22 DIAGNOSIS — E11.9 TYPE 2 DIABETES MELLITUS WITHOUT COMPLICATION: ICD-10-CM

## 2021-09-23 ENCOUNTER — OFFICE VISIT (OUTPATIENT)
Dept: FAMILY MEDICINE | Facility: CLINIC | Age: 76
End: 2021-09-23
Payer: MEDICARE

## 2021-09-23 VITALS
HEART RATE: 53 BPM | OXYGEN SATURATION: 98 % | TEMPERATURE: 98 F | HEIGHT: 66 IN | DIASTOLIC BLOOD PRESSURE: 58 MMHG | SYSTOLIC BLOOD PRESSURE: 120 MMHG | BODY MASS INDEX: 44.91 KG/M2 | WEIGHT: 279.44 LBS

## 2021-09-23 DIAGNOSIS — Z15.89 MTHFR MUTATION (METHYLENETETRAHYDROFOLATE REDUCTASE): ICD-10-CM

## 2021-09-23 DIAGNOSIS — C61 PROSTATE CANCER: ICD-10-CM

## 2021-09-23 DIAGNOSIS — E78.2 MIXED HYPERLIPIDEMIA: ICD-10-CM

## 2021-09-23 DIAGNOSIS — I42.9 CARDIOMYOPATHY, UNSPECIFIED TYPE: ICD-10-CM

## 2021-09-23 DIAGNOSIS — K21.9 GASTROESOPHAGEAL REFLUX DISEASE WITHOUT ESOPHAGITIS: ICD-10-CM

## 2021-09-23 DIAGNOSIS — I73.9 PERIPHERAL VASCULAR DISEASE: ICD-10-CM

## 2021-09-23 DIAGNOSIS — I50.22 CHRONIC SYSTOLIC CONGESTIVE HEART FAILURE: ICD-10-CM

## 2021-09-23 DIAGNOSIS — E11.22 TYPE 2 DIABETES MELLITUS WITH STAGE 3B CHRONIC KIDNEY DISEASE, WITHOUT LONG-TERM CURRENT USE OF INSULIN: Primary | Chronic | ICD-10-CM

## 2021-09-23 DIAGNOSIS — E66.01 MORBID OBESITY: ICD-10-CM

## 2021-09-23 DIAGNOSIS — E11.42 DIABETIC POLYNEUROPATHY ASSOCIATED WITH TYPE 2 DIABETES MELLITUS: ICD-10-CM

## 2021-09-23 DIAGNOSIS — D68.59 HYPERCOAGULABLE STATE: ICD-10-CM

## 2021-09-23 DIAGNOSIS — N18.32 STAGE 3B CHRONIC KIDNEY DISEASE: ICD-10-CM

## 2021-09-23 DIAGNOSIS — I10 ESSENTIAL HYPERTENSION: ICD-10-CM

## 2021-09-23 DIAGNOSIS — I48.0 PAROXYSMAL ATRIAL FIBRILLATION: ICD-10-CM

## 2021-09-23 DIAGNOSIS — N18.32 TYPE 2 DIABETES MELLITUS WITH STAGE 3B CHRONIC KIDNEY DISEASE, WITHOUT LONG-TERM CURRENT USE OF INSULIN: Primary | Chronic | ICD-10-CM

## 2021-09-23 PROCEDURE — 3078F PR MOST RECENT DIASTOLIC BLOOD PRESSURE < 80 MM HG: ICD-10-PCS | Mod: CPTII,S$GLB,, | Performed by: FAMILY MEDICINE

## 2021-09-23 PROCEDURE — 99999 PR PBB SHADOW E&M-EST. PATIENT-LVL V: CPT | Mod: PBBFAC,,, | Performed by: FAMILY MEDICINE

## 2021-09-23 PROCEDURE — 99499 RISK ADDL DX/OHS AUDIT: ICD-10-PCS | Mod: S$GLB,,, | Performed by: FAMILY MEDICINE

## 2021-09-23 PROCEDURE — 99214 PR OFFICE/OUTPT VISIT, EST, LEVL IV, 30-39 MIN: ICD-10-PCS | Mod: S$GLB,,, | Performed by: FAMILY MEDICINE

## 2021-09-23 PROCEDURE — 3008F BODY MASS INDEX DOCD: CPT | Mod: CPTII,S$GLB,, | Performed by: FAMILY MEDICINE

## 2021-09-23 PROCEDURE — 3044F PR MOST RECENT HEMOGLOBIN A1C LEVEL <7.0%: ICD-10-PCS | Mod: CPTII,S$GLB,, | Performed by: FAMILY MEDICINE

## 2021-09-23 PROCEDURE — 1125F PR PAIN SEVERITY QUANTIFIED, PAIN PRESENT: ICD-10-PCS | Mod: CPTII,S$GLB,, | Performed by: FAMILY MEDICINE

## 2021-09-23 PROCEDURE — 3078F DIAST BP <80 MM HG: CPT | Mod: CPTII,S$GLB,, | Performed by: FAMILY MEDICINE

## 2021-09-23 PROCEDURE — 1160F RVW MEDS BY RX/DR IN RCRD: CPT | Mod: CPTII,S$GLB,, | Performed by: FAMILY MEDICINE

## 2021-09-23 PROCEDURE — 1159F MED LIST DOCD IN RCRD: CPT | Mod: CPTII,S$GLB,, | Performed by: FAMILY MEDICINE

## 2021-09-23 PROCEDURE — 3044F HG A1C LEVEL LT 7.0%: CPT | Mod: CPTII,S$GLB,, | Performed by: FAMILY MEDICINE

## 2021-09-23 PROCEDURE — 99999 PR PBB SHADOW E&M-EST. PATIENT-LVL V: ICD-10-PCS | Mod: PBBFAC,,, | Performed by: FAMILY MEDICINE

## 2021-09-23 PROCEDURE — 4010F PR ACE/ARB THEARPY RXD/TAKEN: ICD-10-PCS | Mod: CPTII,S$GLB,, | Performed by: FAMILY MEDICINE

## 2021-09-23 PROCEDURE — 3074F PR MOST RECENT SYSTOLIC BLOOD PRESSURE < 130 MM HG: ICD-10-PCS | Mod: CPTII,S$GLB,, | Performed by: FAMILY MEDICINE

## 2021-09-23 PROCEDURE — 1159F PR MEDICATION LIST DOCUMENTED IN MEDICAL RECORD: ICD-10-PCS | Mod: CPTII,S$GLB,, | Performed by: FAMILY MEDICINE

## 2021-09-23 PROCEDURE — 99499 UNLISTED E&M SERVICE: CPT | Mod: S$GLB,,, | Performed by: FAMILY MEDICINE

## 2021-09-23 PROCEDURE — 4010F ACE/ARB THERAPY RXD/TAKEN: CPT | Mod: CPTII,S$GLB,, | Performed by: FAMILY MEDICINE

## 2021-09-23 PROCEDURE — 1160F PR REVIEW ALL MEDS BY PRESCRIBER/CLIN PHARMACIST DOCUMENTED: ICD-10-PCS | Mod: CPTII,S$GLB,, | Performed by: FAMILY MEDICINE

## 2021-09-23 PROCEDURE — 1125F AMNT PAIN NOTED PAIN PRSNT: CPT | Mod: CPTII,S$GLB,, | Performed by: FAMILY MEDICINE

## 2021-09-23 PROCEDURE — 99214 OFFICE O/P EST MOD 30 MIN: CPT | Mod: S$GLB,,, | Performed by: FAMILY MEDICINE

## 2021-09-23 PROCEDURE — 3074F SYST BP LT 130 MM HG: CPT | Mod: CPTII,S$GLB,, | Performed by: FAMILY MEDICINE

## 2021-09-23 PROCEDURE — 3008F PR BODY MASS INDEX (BMI) DOCUMENTED: ICD-10-PCS | Mod: CPTII,S$GLB,, | Performed by: FAMILY MEDICINE

## 2021-09-26 DIAGNOSIS — I25.10 CORONARY ARTERY DISEASE INVOLVING NATIVE CORONARY ARTERY OF NATIVE HEART WITHOUT ANGINA PECTORIS: Chronic | ICD-10-CM

## 2021-09-27 RX ORDER — CLOPIDOGREL BISULFATE 75 MG/1
TABLET ORAL
Qty: 90 TABLET | Refills: 3 | Status: SHIPPED | OUTPATIENT
Start: 2021-09-27 | End: 2021-12-27 | Stop reason: SDUPTHER

## 2021-09-29 ENCOUNTER — OFFICE VISIT (OUTPATIENT)
Dept: HEMATOLOGY/ONCOLOGY | Facility: CLINIC | Age: 76
End: 2021-09-29
Payer: MEDICARE

## 2021-09-29 VITALS
HEIGHT: 66 IN | SYSTOLIC BLOOD PRESSURE: 149 MMHG | DIASTOLIC BLOOD PRESSURE: 77 MMHG | RESPIRATION RATE: 18 BRPM | HEART RATE: 55 BPM | WEIGHT: 278 LBS | BODY MASS INDEX: 44.68 KG/M2

## 2021-09-29 DIAGNOSIS — D68.59 HYPERCOAGULABLE STATE: ICD-10-CM

## 2021-09-29 DIAGNOSIS — Z15.89 MTHFR MUTATION (METHYLENETETRAHYDROFOLATE REDUCTASE): ICD-10-CM

## 2021-09-29 DIAGNOSIS — Z98.84 H/O BARIATRIC SURGERY: ICD-10-CM

## 2021-09-29 DIAGNOSIS — N18.30 ANEMIA DUE TO STAGE 3 CHRONIC KIDNEY DISEASE, UNSPECIFIED WHETHER STAGE 3A OR 3B CKD: Primary | ICD-10-CM

## 2021-09-29 DIAGNOSIS — D63.1 ANEMIA DUE TO STAGE 3 CHRONIC KIDNEY DISEASE, UNSPECIFIED WHETHER STAGE 3A OR 3B CKD: Primary | ICD-10-CM

## 2021-09-29 DIAGNOSIS — Z79.01 WARFARIN ANTICOAGULATION: ICD-10-CM

## 2021-09-29 DIAGNOSIS — Z79.01 LONG TERM (CURRENT) USE OF ANTICOAGULANTS: ICD-10-CM

## 2021-09-29 PROCEDURE — 99213 PR OFFICE/OUTPT VISIT, EST, LEVL III, 20-29 MIN: ICD-10-PCS | Mod: S$GLB,,, | Performed by: INTERNAL MEDICINE

## 2021-09-29 PROCEDURE — 1159F PR MEDICATION LIST DOCUMENTED IN MEDICAL RECORD: ICD-10-PCS | Mod: S$GLB,,, | Performed by: INTERNAL MEDICINE

## 2021-09-29 PROCEDURE — 4010F PR ACE/ARB THEARPY RXD/TAKEN: ICD-10-PCS | Mod: S$GLB,,, | Performed by: INTERNAL MEDICINE

## 2021-09-29 PROCEDURE — 1160F RVW MEDS BY RX/DR IN RCRD: CPT | Mod: S$GLB,,, | Performed by: INTERNAL MEDICINE

## 2021-09-29 PROCEDURE — 3044F PR MOST RECENT HEMOGLOBIN A1C LEVEL <7.0%: ICD-10-PCS | Mod: S$GLB,,, | Performed by: INTERNAL MEDICINE

## 2021-09-29 PROCEDURE — 3078F PR MOST RECENT DIASTOLIC BLOOD PRESSURE < 80 MM HG: ICD-10-PCS | Mod: S$GLB,,, | Performed by: INTERNAL MEDICINE

## 2021-09-29 PROCEDURE — 3077F PR MOST RECENT SYSTOLIC BLOOD PRESSURE >= 140 MM HG: ICD-10-PCS | Mod: S$GLB,,, | Performed by: INTERNAL MEDICINE

## 2021-09-29 PROCEDURE — 3077F SYST BP >= 140 MM HG: CPT | Mod: S$GLB,,, | Performed by: INTERNAL MEDICINE

## 2021-09-29 PROCEDURE — 3288F FALL RISK ASSESSMENT DOCD: CPT | Mod: S$GLB,,, | Performed by: INTERNAL MEDICINE

## 2021-09-29 PROCEDURE — 3078F DIAST BP <80 MM HG: CPT | Mod: S$GLB,,, | Performed by: INTERNAL MEDICINE

## 2021-09-29 PROCEDURE — 3008F PR BODY MASS INDEX (BMI) DOCUMENTED: ICD-10-PCS | Mod: S$GLB,,, | Performed by: INTERNAL MEDICINE

## 2021-09-29 PROCEDURE — 1100F PR PT FALLS ASSESS DOC 2+ FALLS/FALL W/INJURY/YR: ICD-10-PCS | Mod: S$GLB,,, | Performed by: INTERNAL MEDICINE

## 2021-09-29 PROCEDURE — 1125F PR PAIN SEVERITY QUANTIFIED, PAIN PRESENT: ICD-10-PCS | Mod: S$GLB,,, | Performed by: INTERNAL MEDICINE

## 2021-09-29 PROCEDURE — 3008F BODY MASS INDEX DOCD: CPT | Mod: S$GLB,,, | Performed by: INTERNAL MEDICINE

## 2021-09-29 PROCEDURE — 3044F HG A1C LEVEL LT 7.0%: CPT | Mod: S$GLB,,, | Performed by: INTERNAL MEDICINE

## 2021-09-29 PROCEDURE — 1160F PR REVIEW ALL MEDS BY PRESCRIBER/CLIN PHARMACIST DOCUMENTED: ICD-10-PCS | Mod: S$GLB,,, | Performed by: INTERNAL MEDICINE

## 2021-09-29 PROCEDURE — 1125F AMNT PAIN NOTED PAIN PRSNT: CPT | Mod: S$GLB,,, | Performed by: INTERNAL MEDICINE

## 2021-09-29 PROCEDURE — 1100F PTFALLS ASSESS-DOCD GE2>/YR: CPT | Mod: S$GLB,,, | Performed by: INTERNAL MEDICINE

## 2021-09-29 PROCEDURE — 3288F PR FALLS RISK ASSESSMENT DOCUMENTED: ICD-10-PCS | Mod: S$GLB,,, | Performed by: INTERNAL MEDICINE

## 2021-09-29 PROCEDURE — 1159F MED LIST DOCD IN RCRD: CPT | Mod: S$GLB,,, | Performed by: INTERNAL MEDICINE

## 2021-09-29 PROCEDURE — 99213 OFFICE O/P EST LOW 20 MIN: CPT | Mod: S$GLB,,, | Performed by: INTERNAL MEDICINE

## 2021-09-29 PROCEDURE — 4010F ACE/ARB THERAPY RXD/TAKEN: CPT | Mod: S$GLB,,, | Performed by: INTERNAL MEDICINE

## 2021-09-30 ENCOUNTER — TELEPHONE (OUTPATIENT)
Dept: GASTROENTEROLOGY | Facility: CLINIC | Age: 76
End: 2021-09-30

## 2021-09-30 ENCOUNTER — OFFICE VISIT (OUTPATIENT)
Dept: GASTROENTEROLOGY | Facility: CLINIC | Age: 76
End: 2021-09-30
Payer: MEDICARE

## 2021-09-30 ENCOUNTER — LAB VISIT (OUTPATIENT)
Dept: LAB | Facility: HOSPITAL | Age: 76
End: 2021-09-30
Attending: NURSE PRACTITIONER
Payer: MEDICARE

## 2021-09-30 VITALS — BODY MASS INDEX: 44.57 KG/M2 | WEIGHT: 277.31 LBS | HEIGHT: 66 IN

## 2021-09-30 DIAGNOSIS — K64.8 INTERNAL HEMORRHOIDS: ICD-10-CM

## 2021-09-30 DIAGNOSIS — K62.5 RECTAL BLEEDING: Primary | ICD-10-CM

## 2021-09-30 DIAGNOSIS — K55.20 ANGIODYSPLASIA OF COLON: ICD-10-CM

## 2021-09-30 DIAGNOSIS — Z01.818 PRE-OP TESTING: ICD-10-CM

## 2021-09-30 DIAGNOSIS — Z87.19 HISTORY OF GASTROESOPHAGEAL REFLUX (GERD): ICD-10-CM

## 2021-09-30 DIAGNOSIS — K59.09 INTERMITTENT CONSTIPATION: ICD-10-CM

## 2021-09-30 DIAGNOSIS — D64.9 CHRONIC ANEMIA: ICD-10-CM

## 2021-09-30 DIAGNOSIS — K62.5 RECTAL BLEEDING: ICD-10-CM

## 2021-09-30 DIAGNOSIS — Z86.010 HISTORY OF COLON POLYPS: ICD-10-CM

## 2021-09-30 DIAGNOSIS — Z79.01 CURRENT USE OF ANTICOAGULANT THERAPY: ICD-10-CM

## 2021-09-30 LAB
BASOPHILS # BLD AUTO: 0.03 K/UL (ref 0–0.2)
BASOPHILS NFR BLD: 0.4 % (ref 0–1.9)
DIFFERENTIAL METHOD: ABNORMAL
EOSINOPHIL # BLD AUTO: 0.1 K/UL (ref 0–0.5)
EOSINOPHIL NFR BLD: 1.8 % (ref 0–8)
ERYTHROCYTE [DISTWIDTH] IN BLOOD BY AUTOMATED COUNT: 14.2 % (ref 11.5–14.5)
HCT VFR BLD AUTO: 31.7 % (ref 40–54)
HGB BLD-MCNC: 10.1 G/DL (ref 14–18)
IMM GRANULOCYTES # BLD AUTO: 0.07 K/UL (ref 0–0.04)
IMM GRANULOCYTES NFR BLD AUTO: 0.9 % (ref 0–0.5)
LYMPHOCYTES # BLD AUTO: 1.3 K/UL (ref 1–4.8)
LYMPHOCYTES NFR BLD: 16.8 % (ref 18–48)
MCH RBC QN AUTO: 33.6 PG (ref 27–31)
MCHC RBC AUTO-ENTMCNC: 31.9 G/DL (ref 32–36)
MCV RBC AUTO: 105 FL (ref 82–98)
MONOCYTES # BLD AUTO: 0.5 K/UL (ref 0.3–1)
MONOCYTES NFR BLD: 6.7 % (ref 4–15)
NEUTROPHILS # BLD AUTO: 5.7 K/UL (ref 1.8–7.7)
NEUTROPHILS NFR BLD: 73.4 % (ref 38–73)
NRBC BLD-RTO: 0 /100 WBC
PLATELET # BLD AUTO: 209 K/UL (ref 150–450)
PMV BLD AUTO: 10.3 FL (ref 9.2–12.9)
RBC # BLD AUTO: 3.01 M/UL (ref 4.6–6.2)
WBC # BLD AUTO: 7.8 K/UL (ref 3.9–12.7)

## 2021-09-30 PROCEDURE — 36415 COLL VENOUS BLD VENIPUNCTURE: CPT | Performed by: NURSE PRACTITIONER

## 2021-09-30 PROCEDURE — 1160F PR REVIEW ALL MEDS BY PRESCRIBER/CLIN PHARMACIST DOCUMENTED: ICD-10-PCS | Mod: CPTII,S$GLB,, | Performed by: NURSE PRACTITIONER

## 2021-09-30 PROCEDURE — 4010F ACE/ARB THERAPY RXD/TAKEN: CPT | Mod: CPTII,S$GLB,, | Performed by: NURSE PRACTITIONER

## 2021-09-30 PROCEDURE — 99999 PR PBB SHADOW E&M-EST. PATIENT-LVL III: ICD-10-PCS | Mod: PBBFAC,,, | Performed by: NURSE PRACTITIONER

## 2021-09-30 PROCEDURE — 1159F PR MEDICATION LIST DOCUMENTED IN MEDICAL RECORD: ICD-10-PCS | Mod: CPTII,S$GLB,, | Performed by: NURSE PRACTITIONER

## 2021-09-30 PROCEDURE — 99999 PR PBB SHADOW E&M-EST. PATIENT-LVL III: CPT | Mod: PBBFAC,,, | Performed by: NURSE PRACTITIONER

## 2021-09-30 PROCEDURE — 99214 OFFICE O/P EST MOD 30 MIN: CPT | Mod: S$GLB,,, | Performed by: NURSE PRACTITIONER

## 2021-09-30 PROCEDURE — 3008F PR BODY MASS INDEX (BMI) DOCUMENTED: ICD-10-PCS | Mod: CPTII,S$GLB,, | Performed by: NURSE PRACTITIONER

## 2021-09-30 PROCEDURE — 3044F HG A1C LEVEL LT 7.0%: CPT | Mod: CPTII,S$GLB,, | Performed by: NURSE PRACTITIONER

## 2021-09-30 PROCEDURE — 3044F PR MOST RECENT HEMOGLOBIN A1C LEVEL <7.0%: ICD-10-PCS | Mod: CPTII,S$GLB,, | Performed by: NURSE PRACTITIONER

## 2021-09-30 PROCEDURE — 1159F MED LIST DOCD IN RCRD: CPT | Mod: CPTII,S$GLB,, | Performed by: NURSE PRACTITIONER

## 2021-09-30 PROCEDURE — 1160F RVW MEDS BY RX/DR IN RCRD: CPT | Mod: CPTII,S$GLB,, | Performed by: NURSE PRACTITIONER

## 2021-09-30 PROCEDURE — 99214 PR OFFICE/OUTPT VISIT, EST, LEVL IV, 30-39 MIN: ICD-10-PCS | Mod: S$GLB,,, | Performed by: NURSE PRACTITIONER

## 2021-09-30 PROCEDURE — 85025 COMPLETE CBC W/AUTO DIFF WBC: CPT | Performed by: NURSE PRACTITIONER

## 2021-09-30 PROCEDURE — 4010F PR ACE/ARB THEARPY RXD/TAKEN: ICD-10-PCS | Mod: CPTII,S$GLB,, | Performed by: NURSE PRACTITIONER

## 2021-09-30 PROCEDURE — 3008F BODY MASS INDEX DOCD: CPT | Mod: CPTII,S$GLB,, | Performed by: NURSE PRACTITIONER

## 2021-09-30 RX ORDER — HYDROCORTISONE 25 MG/G
CREAM TOPICAL 2 TIMES DAILY
Qty: 1 TUBE | Refills: 0 | Status: SHIPPED | OUTPATIENT
Start: 2021-09-30 | End: 2023-03-17 | Stop reason: CLARIF

## 2021-10-06 NOTE — TELEPHONE ENCOUNTER
[id number] [facility],  [address] [place] [address] Ave  [place], [address] [place] [address]  Phone: [phone]   :[phone]      CHIEF COMPLAINT  Colon cancer screening     SUBJECTIVE  Tristen Orellana is a 73 y.o. male with medical history of nondysplastic Sanchez's esophagus (C0M2), bipolar disorder, depression, diverticulosis, hyperlipidemia who returns for follow-up. Patient previously followed with GI Dr. Jaxson Brunner for GERD and Sanchez's esophagus. Reports good compliance with omeprazole 20 mg daily. Patient offers no complaints. Denies abdominal pain, nausea, vomiting, fever, chills, unintentional weight loss, constipation, diarrhea, hematochezia or melenic stools.       Date of last office visit and details: 2/23/21 with Dr. Jaxson Brunner  78 y. o. male who presents for followup for GERD/Barretts.  He has been doing well and his reflux is controlled with omeprazole twice daily, he is worried about the side effects of omeprazole in the long-term and would like to see if he can reduce his medications, he does not have any abdominal pain.     Last EGD 12/3/2019 with Dr. Jaxson Brunner- Erosive esophagitis seen at prior exam has healed. Possible Barretts over a 2 cm area from 39 to 41 cms with intervening islands of squamous mucosa, biopsied (path -  gastroesophageal junction mucosa with focal intestinal metaplasia, consistent with Sanchez's mucosa. Negative for dysplasia or malignancy). A 3 cm sliding hiatal hernia. Stomach and duodenum normal to the second portion. Repeat EGD for surveillance in 3 years.     Last Colonoscopy  12/3/2019 with Dr. Jaxson Brunner - Severe diverticulosis in the sigmoid and descending colon. Redundant colon with fair prep in the left colon and poor prep in several areas in the right colon. No large lesions seen but prep is a limitation.  Repeat colonoscopy 2 years with 2 day prep    PAST MEDICAL HISTORY     Past Medical History:   Diagnosis Date    Acid reflux     Sanchez esophagus 2019     LOV: 12/05/18  LAB: 12/27/18  Last written: 10/29/18   Alex, f/u EGD q 3 yr    Bipolar 2 disorder, major depressive episode ([address] 2012    hypomania    BPH (benign prostatic hyperplasia)     Depression 1980    Diverticulitis     Erosive esophagitis 2019    Hyperlipidemia 2010    pt denies    Vitamin D deficiency      FAMILY HISTORY     Family History   Problem Relation Age of Onset    Mental Illness Mother         depression    High Blood Pressure Mother     Stroke Mother [de-identified]    Mental Illness Father         depression    Cancer Father 76        colorectal    Mental Illness Sister         depression    Arthritis Sister     Mental Illness Brother         depression    Arthritis Brother     Thyroid Disease Brother     Cancer Paternal Aunt         ovarian    Cancer Paternal Grandfather         lymphoma    Cancer Paternal Aunt     Diabetes Neg Hx      SOCIAL HISTORY     Social History     Socioeconomic History    Marital status:      Spouse name: Not on file    Number of children: Not on file    Years of education: Not on file    Highest education level: Not on file   Occupational History    Not on file   Tobacco Use    Smoking status: Former Smoker     Packs/day: 1.00     Years: 12.00     Pack years: 12.00     Quit date: 2008     Years since quittin.7    Smokeless tobacco: Never Used   Substance and Sexual Activity    Alcohol use: Not Currently     Alcohol/week: 20.0 standard drinks     Types: 20 Cans of beer per week     Comment: quit     Drug use: No     Comment: quit noe     Sexual activity: Yes     Partners: Female   Other Topics Concern    Not on file   Social History Narrative    Not on file     Social Determinants of Health     Financial Resource Strain: Low Risk     Difficulty of Paying Living Expenses: Not hard at all   Food Insecurity: No Food Insecurity    Worried About [facility] in the Last Year: Never true    Kilo of Food in the Last Year: Never true   Transportation Needs:     Lack of Transportation (Medical):      Lack of Transportation (Non-Medical):    Physical Activity:     Days of Exercise per Week:     Minutes of Exercise per Session:    Stress:     Feeling of Stress :    Social Connections:     Frequency of Communication with Friends and Family:     Frequency of Social Gatherings with Friends and Family:     Attends Denominational Services:     Active Member of Clubs or Organizations:     Attends Club or Organization Meetings:     Marital Status:    Intimate Partner Violence:     Fear of Current or Ex-Partner:     Emotionally Abused:     Physically Abused:     Sexually Abused:      SURGICAL HISTORY     Past Surgical History:   Procedure Laterality Date    APPENDECTOMY  age 27    COLONOSCOPY  2007    COLONOSCOPY N/A 12/3/2019    COLONOSCOPY & EGD performed by Jesica Lee MD at [address] [place] [address]  age 4 and 6    x2    LASIK  2006    PROSTATE BIOPSY      UPPER GASTROINTESTINAL ENDOSCOPY N/A 9/24/2019    EGD performed by Jesica Lee MD at [facility] 12/3/2019    COLONOSCOPY & EGD performed by Jesica Lee MD at [place] [address]   (This list may include medications prescribed during this encounter as epic can not insert only the list prior to this encounter.)  Current Outpatient Rx   Medication Sig Dispense Refill    zolpidem (AMBIEN) 5 MG tablet TAKE ONE TABLET BY MOUTH ONCE NIGHTLY AS NEEDED FOR SLEEP 30 tablet 0    omeprazole (PRILOSEC) 20 MG delayed release capsule Take 1 capsule by mouth Daily 90 capsule 1    polyethylene glycol (MIRALAX) 17 GM/SCOOP POWD powder Take 238 g by mouth daily Take as directed for colonoscopy 255 g 0    magnesium citrate solution Take 296 mLs by mouth once for 1 dose 296 mL 0    sildenafil (VIAGRA) 100 MG tablet TAKE ONE TABLET BY MOUTH DAILY AS NEEDED FOR ERECTILE DYSFUNCTION 6 tablet 1    cetirizine-psuedoephedrine (ZYRTEC-D) 5-120 MG per extended release tablet Take 1 tablet by mouth as needed       venlafaxine (EFFEXOR) 75 MG tablet Take 187 mg by mouth daily       lithium 300 MG capsule Take 300 mg by mouth every evening        ALLERGIES   No Known Allergies  IMMUNIZATIONS     Immunization History   Administered Date(s) Administered    Flu 18 Yrs Intradermal, Preservative Free 12/20/2012    Influenza, Intradermal, Preservative free 12/20/2012, 09/28/2015    Influenza, Quadv, adjuvanted, 65 yrs +, IM, PF (Fluad) 10/01/2020    Influenza, Triv, inactivated, subunit, adjuvanted, IM (Fluad 65 yrs and older) 02/04/2020    Pneumococcal Conjugate 13-valent ([id number]) 04/02/2018    Pneumococcal Polysaccharide ([id number]) 06/19/2019    Tdap (Boostrix, Adacel) 03/30/2012, 12/17/2015       REVIEW OF SYSTEMS   See HPI for further details and pertinent postiives. Negative for the following:  Constitutional: Negative for weight change. Negative for appetite change and fatigue. HENT: Negative for nosebleeds, sore throat, mouth sores, and voice change. Respiratory: Negative for cough, choking and chest tightness. Cardiovascular: Negative for chest pain   Gastrointestinal: See HPI  Musculoskeletal: Negative for arthralgias. Skin: Negative for pallor. Neurological: Negative for weakness and light-headedness. Hematological: Negative for adenopathy. Does not bruise/bleed easily. Psychiatric/Behavioral: Negative for suicidal ideas. PHYSICAL EXAM   VITAL SIGNS: Ht 5' 11\" (1.803 m)   Wt 155 lb (70.3 kg)   BMI 21.62 kg/m²   Wt Readings from Last 3 Encounters:   09/09/21 155 lb (70.3 kg)   08/24/21 156 lb 6.4 oz (70.9 kg)   07/14/21 158 lb 8 oz (71.9 kg)     Constitutional: Well developed, Well nourished, No acute distress, Non-toxic appearance. HENT: Normocephalic, Atraumatic, Bilateral external ears normal, Oropharynx moist, No oral exudates, Nose normal.   Eyes: Conjunctiva normal, No discharge.    Neck: Normal range of motion, No tenderness, Supple, No stridor. Cardiovascular: Normal heart rate, Normal rhythm, No murmurs, No rubs, No gallops. Thorax & Lungs: Normal breath sounds, No respiratory distress, No wheezing, No chest tenderness. Abdomen: normal bowel sounds, soft, non tender, non distended,, no hernias,     Rectal:  Deferred. Skin: Warm, Dry, No erythema, No rash. No bruising. Lower Extremities: Intact distal pulses, No edema, No tenderness, No cyanosis, No clubbing. Neurologic: Alert & oriented x 3, Normal motor function, Normal sensory function, No focal deficits noted.          No results found. FINAL IMPRESSION      Colon cancer screening; history of poor bowel prep  -     COLONOSCOPY W/ OR W/O BIOPSY with 2-day bowel prep  -     bisacodyl (DULCOLAX) 5 MG EC tablet; Take 4 tablets by mouth once for 1 dose Take as directed for colonoscopy. -     polyethylene glycol (MIRALAX) 17 GM/SCOOP POWD powder; Take 238 g by mouth daily Take as directed for colonoscopy  -     magnesium citrate solution; Take 296 mLs by mouth once for 1 dose        Sanchez's esophagus without dysplasia (C0M2)  -     omeprazole (PRILOSEC) 20 MG delayed release capsule; Take 1 capsule by mouth Daily  -     -GERD diet: avoid carbonated/acid beverages, fried and fatty foods. peppermint, chocolate, alcohol, coffee, citrus fruits and juices, tomoato products; avoid lying down for 2 to 3 hours after eating, avoid tight fitting clothing.    -      Repeat EGD in 2022     Gastroesophageal reflux disease without esophagitis  -     omeprazole (PRILOSEC) 20 MG delayed release capsule; Take 1 capsule by mouth Daily  -     GERD diet: avoid carbonated/acid beverages, fried and fatty foods.  peppermint, chocolate, alcohol, coffee, citrus fruits and juices, tomoato products; avoid lying down for 2 to 3 hours after eating, avoid tight fitting clothing.       Colonoscopy with alternatives, rationale, benefits and risks, not limited to bleeding, infection, perforation, need of surgery, prolong hospital stay and anesthesia side effects were explained. Patient verbalized understanding and agrees to proceed with colonoscopy. ORDERED FUTURE/PENDING TESTS     Lab Frequency Next Occurrence   Home Sleep Study Once 09/09/2021   Covid-19 Ambulatory Once 10/01/2021       FOLLOWUP   No follow-ups on file.           India Roberts MD 10/6/21 9:22 AM EDT    CC:  Suhail Loera MD

## 2021-10-07 ENCOUNTER — LAB VISIT (OUTPATIENT)
Dept: LAB | Facility: HOSPITAL | Age: 76
End: 2021-10-07
Attending: UROLOGY
Payer: MEDICARE

## 2021-10-07 ENCOUNTER — PATIENT MESSAGE (OUTPATIENT)
Dept: ADMINISTRATIVE | Facility: HOSPITAL | Age: 76
End: 2021-10-07

## 2021-10-07 DIAGNOSIS — C61 PROSTATE CANCER: ICD-10-CM

## 2021-10-07 LAB — COMPLEXED PSA SERPL-MCNC: 0.02 NG/ML (ref 0–4)

## 2021-10-07 PROCEDURE — 84153 ASSAY OF PSA TOTAL: CPT | Performed by: UROLOGY

## 2021-10-07 PROCEDURE — 36415 COLL VENOUS BLD VENIPUNCTURE: CPT | Performed by: UROLOGY

## 2021-10-12 ENCOUNTER — PATIENT MESSAGE (OUTPATIENT)
Dept: FAMILY MEDICINE | Facility: CLINIC | Age: 76
End: 2021-10-12

## 2021-10-12 ENCOUNTER — PATIENT OUTREACH (OUTPATIENT)
Dept: ADMINISTRATIVE | Facility: OTHER | Age: 76
End: 2021-10-12

## 2021-10-14 ENCOUNTER — TELEPHONE (OUTPATIENT)
Dept: GASTROENTEROLOGY | Facility: CLINIC | Age: 76
End: 2021-10-14

## 2021-10-14 ENCOUNTER — OFFICE VISIT (OUTPATIENT)
Dept: UROLOGY | Facility: CLINIC | Age: 76
End: 2021-10-14
Payer: MEDICARE

## 2021-10-14 ENCOUNTER — PATIENT MESSAGE (OUTPATIENT)
Dept: FAMILY MEDICINE | Facility: CLINIC | Age: 76
End: 2021-10-14

## 2021-10-14 VITALS
BODY MASS INDEX: 43.26 KG/M2 | WEIGHT: 269.19 LBS | HEIGHT: 66 IN | SYSTOLIC BLOOD PRESSURE: 147 MMHG | DIASTOLIC BLOOD PRESSURE: 77 MMHG | HEART RATE: 52 BPM

## 2021-10-14 DIAGNOSIS — Z79.01 WARFARIN ANTICOAGULATION: Primary | ICD-10-CM

## 2021-10-14 DIAGNOSIS — K62.5 RECTAL BLEEDING: ICD-10-CM

## 2021-10-14 DIAGNOSIS — C61 PROSTATE CANCER: Primary | ICD-10-CM

## 2021-10-14 DIAGNOSIS — Z15.89 MTHFR MUTATION (METHYLENETETRAHYDROFOLATE REDUCTASE): ICD-10-CM

## 2021-10-14 DIAGNOSIS — N32.81 OAB (OVERACTIVE BLADDER): ICD-10-CM

## 2021-10-14 DIAGNOSIS — I48.0 PAROXYSMAL ATRIAL FIBRILLATION: ICD-10-CM

## 2021-10-14 DIAGNOSIS — N28.1 COMPLEX RENAL CYST: ICD-10-CM

## 2021-10-14 LAB
BILIRUB SERPL-MCNC: ABNORMAL MG/DL
BLOOD URINE, POC: ABNORMAL
CLARITY, POC UA: CLEAR
COLOR, POC UA: YELLOW
GLUCOSE UR QL STRIP: ABNORMAL
KETONES UR QL STRIP: ABNORMAL
LEUKOCYTE ESTERASE URINE, POC: ABNORMAL
NITRITE, POC UA: ABNORMAL
PH, POC UA: 5.5
PROTEIN, POC: ABNORMAL
SPECIFIC GRAVITY, POC UA: 1.03
UROBILINOGEN, POC UA: ABNORMAL

## 2021-10-14 PROCEDURE — 3078F DIAST BP <80 MM HG: CPT | Mod: CPTII,S$GLB,, | Performed by: UROLOGY

## 2021-10-14 PROCEDURE — 3008F BODY MASS INDEX DOCD: CPT | Mod: CPTII,S$GLB,, | Performed by: UROLOGY

## 2021-10-14 PROCEDURE — 3077F PR MOST RECENT SYSTOLIC BLOOD PRESSURE >= 140 MM HG: ICD-10-PCS | Mod: CPTII,S$GLB,, | Performed by: UROLOGY

## 2021-10-14 PROCEDURE — 99213 OFFICE O/P EST LOW 20 MIN: CPT | Mod: S$GLB,,, | Performed by: UROLOGY

## 2021-10-14 PROCEDURE — 99999 PR PBB SHADOW E&M-EST. PATIENT-LVL III: CPT | Mod: PBBFAC,,, | Performed by: UROLOGY

## 2021-10-14 PROCEDURE — 99213 PR OFFICE/OUTPT VISIT, EST, LEVL III, 20-29 MIN: ICD-10-PCS | Mod: S$GLB,,, | Performed by: UROLOGY

## 2021-10-14 PROCEDURE — 1159F PR MEDICATION LIST DOCUMENTED IN MEDICAL RECORD: ICD-10-PCS | Mod: CPTII,S$GLB,, | Performed by: UROLOGY

## 2021-10-14 PROCEDURE — 81002 POCT URINE DIPSTICK WITHOUT MICROSCOPE: ICD-10-PCS | Mod: S$GLB,,, | Performed by: UROLOGY

## 2021-10-14 PROCEDURE — 4010F PR ACE/ARB THEARPY RXD/TAKEN: ICD-10-PCS | Mod: CPTII,S$GLB,, | Performed by: UROLOGY

## 2021-10-14 PROCEDURE — 81002 URINALYSIS NONAUTO W/O SCOPE: CPT | Mod: S$GLB,,, | Performed by: UROLOGY

## 2021-10-14 PROCEDURE — 1159F MED LIST DOCD IN RCRD: CPT | Mod: CPTII,S$GLB,, | Performed by: UROLOGY

## 2021-10-14 PROCEDURE — 99999 PR PBB SHADOW E&M-EST. PATIENT-LVL III: ICD-10-PCS | Mod: PBBFAC,,, | Performed by: UROLOGY

## 2021-10-14 PROCEDURE — 3077F SYST BP >= 140 MM HG: CPT | Mod: CPTII,S$GLB,, | Performed by: UROLOGY

## 2021-10-14 PROCEDURE — 3008F PR BODY MASS INDEX (BMI) DOCUMENTED: ICD-10-PCS | Mod: CPTII,S$GLB,, | Performed by: UROLOGY

## 2021-10-14 PROCEDURE — 1160F PR REVIEW ALL MEDS BY PRESCRIBER/CLIN PHARMACIST DOCUMENTED: ICD-10-PCS | Mod: CPTII,S$GLB,, | Performed by: UROLOGY

## 2021-10-14 PROCEDURE — 3044F HG A1C LEVEL LT 7.0%: CPT | Mod: CPTII,S$GLB,, | Performed by: UROLOGY

## 2021-10-14 PROCEDURE — 4010F ACE/ARB THERAPY RXD/TAKEN: CPT | Mod: CPTII,S$GLB,, | Performed by: UROLOGY

## 2021-10-14 PROCEDURE — 3044F PR MOST RECENT HEMOGLOBIN A1C LEVEL <7.0%: ICD-10-PCS | Mod: CPTII,S$GLB,, | Performed by: UROLOGY

## 2021-10-14 PROCEDURE — 1160F RVW MEDS BY RX/DR IN RCRD: CPT | Mod: CPTII,S$GLB,, | Performed by: UROLOGY

## 2021-10-14 PROCEDURE — 3078F PR MOST RECENT DIASTOLIC BLOOD PRESSURE < 80 MM HG: ICD-10-PCS | Mod: CPTII,S$GLB,, | Performed by: UROLOGY

## 2021-10-15 ENCOUNTER — OFFICE VISIT (OUTPATIENT)
Dept: CARDIOLOGY | Facility: CLINIC | Age: 76
End: 2021-10-15
Payer: MEDICARE

## 2021-10-15 ENCOUNTER — IMMUNIZATION (OUTPATIENT)
Dept: PRIMARY CARE CLINIC | Facility: CLINIC | Age: 76
End: 2021-10-15
Payer: MEDICARE

## 2021-10-15 ENCOUNTER — PATIENT MESSAGE (OUTPATIENT)
Dept: FAMILY MEDICINE | Facility: CLINIC | Age: 76
End: 2021-10-15
Payer: MEDICARE

## 2021-10-15 VITALS
HEIGHT: 66 IN | BODY MASS INDEX: 43.45 KG/M2 | DIASTOLIC BLOOD PRESSURE: 55 MMHG | SYSTOLIC BLOOD PRESSURE: 104 MMHG | OXYGEN SATURATION: 95 % | RESPIRATION RATE: 17 BRPM | HEART RATE: 56 BPM

## 2021-10-15 DIAGNOSIS — Z95.5 STATUS POST INSERTION OF DRUG ELUTING CORONARY ARTERY STENT: ICD-10-CM

## 2021-10-15 DIAGNOSIS — Z23 NEED FOR VACCINATION: Primary | ICD-10-CM

## 2021-10-15 DIAGNOSIS — Z79.01 WARFARIN ANTICOAGULATION: ICD-10-CM

## 2021-10-15 DIAGNOSIS — Z15.89 MTHFR MUTATION (METHYLENETETRAHYDROFOLATE REDUCTASE): ICD-10-CM

## 2021-10-15 DIAGNOSIS — I51.9 LV DYSFUNCTION: ICD-10-CM

## 2021-10-15 DIAGNOSIS — Z79.01 LONG TERM (CURRENT) USE OF ANTICOAGULANTS: ICD-10-CM

## 2021-10-15 DIAGNOSIS — E78.49 OTHER HYPERLIPIDEMIA: ICD-10-CM

## 2021-10-15 DIAGNOSIS — I48.0 PAROXYSMAL ATRIAL FIBRILLATION: ICD-10-CM

## 2021-10-15 PROCEDURE — 3288F PR FALLS RISK ASSESSMENT DOCUMENTED: ICD-10-PCS | Mod: CPTII,S$GLB,, | Performed by: NURSE PRACTITIONER

## 2021-10-15 PROCEDURE — 4010F ACE/ARB THERAPY RXD/TAKEN: CPT | Mod: CPTII,S$GLB,, | Performed by: NURSE PRACTITIONER

## 2021-10-15 PROCEDURE — 0003A COVID-19, MRNA, LNP-S, PF, 30 MCG/0.3 ML DOSE VACCINE: CPT | Mod: PBBFAC | Performed by: EMERGENCY MEDICINE

## 2021-10-15 PROCEDURE — 3008F BODY MASS INDEX DOCD: CPT | Mod: CPTII,S$GLB,, | Performed by: NURSE PRACTITIONER

## 2021-10-15 PROCEDURE — 99999 PR PBB SHADOW E&M-EST. PATIENT-LVL III: CPT | Mod: PBBFAC,,, | Performed by: NURSE PRACTITIONER

## 2021-10-15 PROCEDURE — 1160F PR REVIEW ALL MEDS BY PRESCRIBER/CLIN PHARMACIST DOCUMENTED: ICD-10-PCS | Mod: CPTII,S$GLB,, | Performed by: NURSE PRACTITIONER

## 2021-10-15 PROCEDURE — 3044F HG A1C LEVEL LT 7.0%: CPT | Mod: CPTII,S$GLB,, | Performed by: NURSE PRACTITIONER

## 2021-10-15 PROCEDURE — 3074F SYST BP LT 130 MM HG: CPT | Mod: CPTII,S$GLB,, | Performed by: NURSE PRACTITIONER

## 2021-10-15 PROCEDURE — 3078F PR MOST RECENT DIASTOLIC BLOOD PRESSURE < 80 MM HG: ICD-10-PCS | Mod: CPTII,S$GLB,, | Performed by: NURSE PRACTITIONER

## 2021-10-15 PROCEDURE — 1126F PR PAIN SEVERITY QUANTIFIED, NO PAIN PRESENT: ICD-10-PCS | Mod: CPTII,S$GLB,, | Performed by: NURSE PRACTITIONER

## 2021-10-15 PROCEDURE — 99204 PR OFFICE/OUTPT VISIT, NEW, LEVL IV, 45-59 MIN: ICD-10-PCS | Mod: S$GLB,,, | Performed by: NURSE PRACTITIONER

## 2021-10-15 PROCEDURE — 1101F PR PT FALLS ASSESS DOC 0-1 FALLS W/OUT INJ PAST YR: ICD-10-PCS | Mod: CPTII,S$GLB,, | Performed by: NURSE PRACTITIONER

## 2021-10-15 PROCEDURE — 3078F DIAST BP <80 MM HG: CPT | Mod: CPTII,S$GLB,, | Performed by: NURSE PRACTITIONER

## 2021-10-15 PROCEDURE — 99999 PR PBB SHADOW E&M-EST. PATIENT-LVL III: ICD-10-PCS | Mod: PBBFAC,,, | Performed by: NURSE PRACTITIONER

## 2021-10-15 PROCEDURE — 1126F AMNT PAIN NOTED NONE PRSNT: CPT | Mod: CPTII,S$GLB,, | Performed by: NURSE PRACTITIONER

## 2021-10-15 PROCEDURE — 91300 COVID-19, MRNA, LNP-S, PF, 30 MCG/0.3 ML DOSE VACCINE: CPT | Mod: PBBFAC | Performed by: EMERGENCY MEDICINE

## 2021-10-15 PROCEDURE — 3074F PR MOST RECENT SYSTOLIC BLOOD PRESSURE < 130 MM HG: ICD-10-PCS | Mod: CPTII,S$GLB,, | Performed by: NURSE PRACTITIONER

## 2021-10-15 PROCEDURE — 1159F MED LIST DOCD IN RCRD: CPT | Mod: CPTII,S$GLB,, | Performed by: NURSE PRACTITIONER

## 2021-10-15 PROCEDURE — 4010F PR ACE/ARB THEARPY RXD/TAKEN: ICD-10-PCS | Mod: CPTII,S$GLB,, | Performed by: NURSE PRACTITIONER

## 2021-10-15 PROCEDURE — 1101F PT FALLS ASSESS-DOCD LE1/YR: CPT | Mod: CPTII,S$GLB,, | Performed by: NURSE PRACTITIONER

## 2021-10-15 PROCEDURE — 3288F FALL RISK ASSESSMENT DOCD: CPT | Mod: CPTII,S$GLB,, | Performed by: NURSE PRACTITIONER

## 2021-10-15 PROCEDURE — 1160F RVW MEDS BY RX/DR IN RCRD: CPT | Mod: CPTII,S$GLB,, | Performed by: NURSE PRACTITIONER

## 2021-10-15 PROCEDURE — 3044F PR MOST RECENT HEMOGLOBIN A1C LEVEL <7.0%: ICD-10-PCS | Mod: CPTII,S$GLB,, | Performed by: NURSE PRACTITIONER

## 2021-10-15 PROCEDURE — 1159F PR MEDICATION LIST DOCUMENTED IN MEDICAL RECORD: ICD-10-PCS | Mod: CPTII,S$GLB,, | Performed by: NURSE PRACTITIONER

## 2021-10-15 PROCEDURE — 3008F PR BODY MASS INDEX (BMI) DOCUMENTED: ICD-10-PCS | Mod: CPTII,S$GLB,, | Performed by: NURSE PRACTITIONER

## 2021-10-15 PROCEDURE — 99204 OFFICE O/P NEW MOD 45 MIN: CPT | Mod: S$GLB,,, | Performed by: NURSE PRACTITIONER

## 2021-10-18 ENCOUNTER — PATIENT MESSAGE (OUTPATIENT)
Dept: ADMINISTRATIVE | Facility: HOSPITAL | Age: 76
End: 2021-10-18
Payer: MEDICARE

## 2021-10-20 ENCOUNTER — PATIENT MESSAGE (OUTPATIENT)
Dept: CARDIOLOGY | Facility: CLINIC | Age: 76
End: 2021-10-20
Payer: MEDICARE

## 2021-10-21 ENCOUNTER — PES CALL (OUTPATIENT)
Dept: ADMINISTRATIVE | Facility: CLINIC | Age: 76
End: 2021-10-21

## 2021-10-27 ENCOUNTER — HOSPITAL ENCOUNTER (OUTPATIENT)
Facility: HOSPITAL | Age: 76
Discharge: HOME | End: 2021-10-27
Attending: INTERNAL MEDICINE | Admitting: INTERNAL MEDICINE
Payer: MEDICARE

## 2021-10-27 ENCOUNTER — ANESTHESIA (OUTPATIENT)
Dept: ENDOSCOPY | Facility: HOSPITAL | Age: 76
End: 2021-10-27
Payer: MEDICARE

## 2021-10-27 ENCOUNTER — ANESTHESIA EVENT (OUTPATIENT)
Dept: ENDOSCOPY | Facility: HOSPITAL | Age: 76
End: 2021-10-27
Payer: MEDICARE

## 2021-10-27 VITALS
HEART RATE: 65 BPM | OXYGEN SATURATION: 97 % | TEMPERATURE: 97 F | WEIGHT: 268 LBS | BODY MASS INDEX: 43.26 KG/M2 | DIASTOLIC BLOOD PRESSURE: 66 MMHG | RESPIRATION RATE: 16 BRPM | SYSTOLIC BLOOD PRESSURE: 125 MMHG

## 2021-10-27 DIAGNOSIS — K63.5 POLYP OF COLON, UNSPECIFIED PART OF COLON, UNSPECIFIED TYPE: Primary | ICD-10-CM

## 2021-10-27 DIAGNOSIS — Z87.74 HISTORY OF ARTERIOVENOUS MALFORMATION (AVM): ICD-10-CM

## 2021-10-27 DIAGNOSIS — K57.90 DIVERTICULOSIS: ICD-10-CM

## 2021-10-27 DIAGNOSIS — K64.8 INTERNAL HEMORRHOIDS: ICD-10-CM

## 2021-10-27 PROCEDURE — D9220A PRA ANESTHESIA: ICD-10-PCS | Mod: PT,CRNA,, | Performed by: NURSE ANESTHETIST, CERTIFIED REGISTERED

## 2021-10-27 PROCEDURE — 45385 COLONOSCOPY W/LESION REMOVAL: CPT | Mod: PT | Performed by: INTERNAL MEDICINE

## 2021-10-27 PROCEDURE — 25000003 PHARM REV CODE 250: Performed by: NURSE ANESTHETIST, CERTIFIED REGISTERED

## 2021-10-27 PROCEDURE — 45385 COLONOSCOPY W/LESION REMOVAL: CPT | Mod: PT,,, | Performed by: INTERNAL MEDICINE

## 2021-10-27 PROCEDURE — 63600175 PHARM REV CODE 636 W HCPCS: Performed by: NURSE ANESTHETIST, CERTIFIED REGISTERED

## 2021-10-27 PROCEDURE — 45380 COLONOSCOPY AND BIOPSY: CPT | Mod: 59,PT,, | Performed by: INTERNAL MEDICINE

## 2021-10-27 PROCEDURE — D9220A PRA ANESTHESIA: Mod: PT,ANES,, | Performed by: ANESTHESIOLOGY

## 2021-10-27 PROCEDURE — 88305 TISSUE EXAM BY PATHOLOGIST: CPT | Performed by: PATHOLOGY

## 2021-10-27 PROCEDURE — 37000008 HC ANESTHESIA 1ST 15 MINUTES: Performed by: INTERNAL MEDICINE

## 2021-10-27 PROCEDURE — 88305 TISSUE EXAM BY PATHOLOGIST: ICD-10-PCS | Mod: 26,,, | Performed by: PATHOLOGY

## 2021-10-27 PROCEDURE — 37000009 HC ANESTHESIA EA ADD 15 MINS: Performed by: INTERNAL MEDICINE

## 2021-10-27 PROCEDURE — D9220A PRA ANESTHESIA: Mod: PT,CRNA,, | Performed by: NURSE ANESTHETIST, CERTIFIED REGISTERED

## 2021-10-27 PROCEDURE — 88305 TISSUE EXAM BY PATHOLOGIST: CPT | Mod: 26,,, | Performed by: PATHOLOGY

## 2021-10-27 PROCEDURE — 45385 PR COLONOSCOPY,REMV LESN,SNARE: ICD-10-PCS | Mod: PT,,, | Performed by: INTERNAL MEDICINE

## 2021-10-27 PROCEDURE — 45380 PR COLONOSCOPY,BIOPSY: ICD-10-PCS | Mod: 59,PT,, | Performed by: INTERNAL MEDICINE

## 2021-10-27 PROCEDURE — 45380 COLONOSCOPY AND BIOPSY: CPT | Mod: PT | Performed by: INTERNAL MEDICINE

## 2021-10-27 PROCEDURE — D9220A PRA ANESTHESIA: ICD-10-PCS | Mod: PT,ANES,, | Performed by: ANESTHESIOLOGY

## 2021-10-27 PROCEDURE — 25000003 PHARM REV CODE 250: Performed by: INTERNAL MEDICINE

## 2021-10-27 PROCEDURE — 27201012 HC FORCEPS, HOT/COLD, DISP: Performed by: INTERNAL MEDICINE

## 2021-10-27 PROCEDURE — 27201089 HC SNARE, DISP (ANY): Performed by: INTERNAL MEDICINE

## 2021-10-27 RX ORDER — EPHEDRINE SULFATE 50 MG/ML
INJECTION, SOLUTION INTRAVENOUS
Status: DISCONTINUED | OUTPATIENT
Start: 2021-10-27 | End: 2021-10-27

## 2021-10-27 RX ORDER — LIDOCAINE HYDROCHLORIDE 20 MG/ML
INJECTION INTRAVENOUS
Status: DISCONTINUED | OUTPATIENT
Start: 2021-10-27 | End: 2021-10-27

## 2021-10-27 RX ORDER — PROPOFOL 10 MG/ML
VIAL (ML) INTRAVENOUS
Status: DISCONTINUED | OUTPATIENT
Start: 2021-10-27 | End: 2021-10-27

## 2021-10-27 RX ORDER — SODIUM CHLORIDE 9 MG/ML
INJECTION, SOLUTION INTRAVENOUS CONTINUOUS
Status: DISCONTINUED | OUTPATIENT
Start: 2021-10-27 | End: 2021-10-27 | Stop reason: HOSPADM

## 2021-10-27 RX ADMIN — PROPOFOL 20 MG: 10 INJECTION, EMULSION INTRAVENOUS at 12:10

## 2021-10-27 RX ADMIN — LIDOCAINE HYDROCHLORIDE 50 MG: 20 INJECTION, SOLUTION INTRAVENOUS at 12:10

## 2021-10-27 RX ADMIN — PROPOFOL 80 MG: 10 INJECTION, EMULSION INTRAVENOUS at 12:10

## 2021-10-27 RX ADMIN — SODIUM CHLORIDE: 0.9 INJECTION, SOLUTION INTRAVENOUS at 11:10

## 2021-10-27 RX ADMIN — EPHEDRINE SULFATE 10 MG: 50 INJECTION INTRAVENOUS at 01:10

## 2021-10-29 ENCOUNTER — OFFICE VISIT (OUTPATIENT)
Dept: CARDIOLOGY | Facility: CLINIC | Age: 76
End: 2021-10-29
Payer: MEDICARE

## 2021-10-29 VITALS
HEIGHT: 66 IN | SYSTOLIC BLOOD PRESSURE: 102 MMHG | OXYGEN SATURATION: 98 % | WEIGHT: 268 LBS | DIASTOLIC BLOOD PRESSURE: 58 MMHG | RESPIRATION RATE: 18 BRPM | HEART RATE: 56 BPM | BODY MASS INDEX: 43.07 KG/M2

## 2021-10-29 DIAGNOSIS — Z79.01 LONG TERM (CURRENT) USE OF ANTICOAGULANTS: ICD-10-CM

## 2021-10-29 DIAGNOSIS — I51.9 LV DYSFUNCTION: ICD-10-CM

## 2021-10-29 DIAGNOSIS — I48.0 PAROXYSMAL ATRIAL FIBRILLATION: ICD-10-CM

## 2021-10-29 DIAGNOSIS — E78.2 MIXED HYPERLIPIDEMIA: ICD-10-CM

## 2021-10-29 DIAGNOSIS — I71.20 THORACIC AORTIC ANEURYSM WITHOUT RUPTURE: Chronic | ICD-10-CM

## 2021-10-29 PROCEDURE — 3074F PR MOST RECENT SYSTOLIC BLOOD PRESSURE < 130 MM HG: ICD-10-PCS | Mod: CPTII,S$GLB,, | Performed by: NURSE PRACTITIONER

## 2021-10-29 PROCEDURE — 4010F PR ACE/ARB THEARPY RXD/TAKEN: ICD-10-PCS | Mod: CPTII,S$GLB,, | Performed by: NURSE PRACTITIONER

## 2021-10-29 PROCEDURE — 1125F AMNT PAIN NOTED PAIN PRSNT: CPT | Mod: CPTII,S$GLB,, | Performed by: NURSE PRACTITIONER

## 2021-10-29 PROCEDURE — 3008F BODY MASS INDEX DOCD: CPT | Mod: CPTII,S$GLB,, | Performed by: NURSE PRACTITIONER

## 2021-10-29 PROCEDURE — 4010F ACE/ARB THERAPY RXD/TAKEN: CPT | Mod: CPTII,S$GLB,, | Performed by: NURSE PRACTITIONER

## 2021-10-29 PROCEDURE — 99999 PR PBB SHADOW E&M-EST. PATIENT-LVL III: ICD-10-PCS | Mod: PBBFAC,,, | Performed by: NURSE PRACTITIONER

## 2021-10-29 PROCEDURE — 1160F PR REVIEW ALL MEDS BY PRESCRIBER/CLIN PHARMACIST DOCUMENTED: ICD-10-PCS | Mod: CPTII,S$GLB,, | Performed by: NURSE PRACTITIONER

## 2021-10-29 PROCEDURE — 1159F MED LIST DOCD IN RCRD: CPT | Mod: CPTII,S$GLB,, | Performed by: NURSE PRACTITIONER

## 2021-10-29 PROCEDURE — 3288F PR FALLS RISK ASSESSMENT DOCUMENTED: ICD-10-PCS | Mod: CPTII,S$GLB,, | Performed by: NURSE PRACTITIONER

## 2021-10-29 PROCEDURE — 1125F PR PAIN SEVERITY QUANTIFIED, PAIN PRESENT: ICD-10-PCS | Mod: CPTII,S$GLB,, | Performed by: NURSE PRACTITIONER

## 2021-10-29 PROCEDURE — 99213 OFFICE O/P EST LOW 20 MIN: CPT | Mod: S$GLB,,, | Performed by: NURSE PRACTITIONER

## 2021-10-29 PROCEDURE — 3288F FALL RISK ASSESSMENT DOCD: CPT | Mod: CPTII,S$GLB,, | Performed by: NURSE PRACTITIONER

## 2021-10-29 PROCEDURE — 1160F RVW MEDS BY RX/DR IN RCRD: CPT | Mod: CPTII,S$GLB,, | Performed by: NURSE PRACTITIONER

## 2021-10-29 PROCEDURE — 3078F DIAST BP <80 MM HG: CPT | Mod: CPTII,S$GLB,, | Performed by: NURSE PRACTITIONER

## 2021-10-29 PROCEDURE — 99999 PR PBB SHADOW E&M-EST. PATIENT-LVL III: CPT | Mod: PBBFAC,,, | Performed by: NURSE PRACTITIONER

## 2021-10-29 PROCEDURE — 3044F PR MOST RECENT HEMOGLOBIN A1C LEVEL <7.0%: ICD-10-PCS | Mod: CPTII,S$GLB,, | Performed by: NURSE PRACTITIONER

## 2021-10-29 PROCEDURE — 3078F PR MOST RECENT DIASTOLIC BLOOD PRESSURE < 80 MM HG: ICD-10-PCS | Mod: CPTII,S$GLB,, | Performed by: NURSE PRACTITIONER

## 2021-10-29 PROCEDURE — 3074F SYST BP LT 130 MM HG: CPT | Mod: CPTII,S$GLB,, | Performed by: NURSE PRACTITIONER

## 2021-10-29 PROCEDURE — 99213 PR OFFICE/OUTPT VISIT, EST, LEVL III, 20-29 MIN: ICD-10-PCS | Mod: S$GLB,,, | Performed by: NURSE PRACTITIONER

## 2021-10-29 PROCEDURE — 1159F PR MEDICATION LIST DOCUMENTED IN MEDICAL RECORD: ICD-10-PCS | Mod: CPTII,S$GLB,, | Performed by: NURSE PRACTITIONER

## 2021-10-29 PROCEDURE — 1101F PT FALLS ASSESS-DOCD LE1/YR: CPT | Mod: CPTII,S$GLB,, | Performed by: NURSE PRACTITIONER

## 2021-10-29 PROCEDURE — 3008F PR BODY MASS INDEX (BMI) DOCUMENTED: ICD-10-PCS | Mod: CPTII,S$GLB,, | Performed by: NURSE PRACTITIONER

## 2021-10-29 PROCEDURE — 1101F PR PT FALLS ASSESS DOC 0-1 FALLS W/OUT INJ PAST YR: ICD-10-PCS | Mod: CPTII,S$GLB,, | Performed by: NURSE PRACTITIONER

## 2021-10-29 PROCEDURE — 3044F HG A1C LEVEL LT 7.0%: CPT | Mod: CPTII,S$GLB,, | Performed by: NURSE PRACTITIONER

## 2021-11-03 ENCOUNTER — PATIENT MESSAGE (OUTPATIENT)
Dept: PAIN MEDICINE | Facility: CLINIC | Age: 76
End: 2021-11-03

## 2021-11-03 ENCOUNTER — OFFICE VISIT (OUTPATIENT)
Dept: PAIN MEDICINE | Facility: CLINIC | Age: 76
End: 2021-11-03
Payer: MEDICARE

## 2021-11-03 VITALS
HEIGHT: 66 IN | BODY MASS INDEX: 43.07 KG/M2 | WEIGHT: 268 LBS | HEART RATE: 59 BPM | SYSTOLIC BLOOD PRESSURE: 120 MMHG | DIASTOLIC BLOOD PRESSURE: 60 MMHG

## 2021-11-03 DIAGNOSIS — M51.36 DDD (DEGENERATIVE DISC DISEASE), LUMBAR: ICD-10-CM

## 2021-11-03 DIAGNOSIS — G89.4 CHRONIC PAIN DISORDER: Primary | ICD-10-CM

## 2021-11-03 DIAGNOSIS — M17.12 PRIMARY OSTEOARTHRITIS OF LEFT KNEE: ICD-10-CM

## 2021-11-03 DIAGNOSIS — M47.896 OTHER SPONDYLOSIS, LUMBAR REGION: Primary | ICD-10-CM

## 2021-11-03 LAB
FINAL PATHOLOGIC DIAGNOSIS: NORMAL
GROSS: NORMAL
Lab: NORMAL

## 2021-11-03 PROCEDURE — 99999 PR PBB SHADOW E&M-EST. PATIENT-LVL III: CPT | Mod: PBBFAC,,, | Performed by: PHYSICIAN ASSISTANT

## 2021-11-03 PROCEDURE — 3008F PR BODY MASS INDEX (BMI) DOCUMENTED: ICD-10-PCS | Mod: CPTII,S$GLB,, | Performed by: PHYSICIAN ASSISTANT

## 2021-11-03 PROCEDURE — 3044F PR MOST RECENT HEMOGLOBIN A1C LEVEL <7.0%: ICD-10-PCS | Mod: CPTII,S$GLB,, | Performed by: PHYSICIAN ASSISTANT

## 2021-11-03 PROCEDURE — 3288F PR FALLS RISK ASSESSMENT DOCUMENTED: ICD-10-PCS | Mod: CPTII,S$GLB,, | Performed by: PHYSICIAN ASSISTANT

## 2021-11-03 PROCEDURE — 99214 OFFICE O/P EST MOD 30 MIN: CPT | Mod: S$GLB,,, | Performed by: PHYSICIAN ASSISTANT

## 2021-11-03 PROCEDURE — 1125F PR PAIN SEVERITY QUANTIFIED, PAIN PRESENT: ICD-10-PCS | Mod: CPTII,S$GLB,, | Performed by: PHYSICIAN ASSISTANT

## 2021-11-03 PROCEDURE — 3078F DIAST BP <80 MM HG: CPT | Mod: CPTII,S$GLB,, | Performed by: PHYSICIAN ASSISTANT

## 2021-11-03 PROCEDURE — 1101F PT FALLS ASSESS-DOCD LE1/YR: CPT | Mod: CPTII,S$GLB,, | Performed by: PHYSICIAN ASSISTANT

## 2021-11-03 PROCEDURE — 1101F PR PT FALLS ASSESS DOC 0-1 FALLS W/OUT INJ PAST YR: ICD-10-PCS | Mod: CPTII,S$GLB,, | Performed by: PHYSICIAN ASSISTANT

## 2021-11-03 PROCEDURE — 4010F ACE/ARB THERAPY RXD/TAKEN: CPT | Mod: CPTII,S$GLB,, | Performed by: PHYSICIAN ASSISTANT

## 2021-11-03 PROCEDURE — 1125F AMNT PAIN NOTED PAIN PRSNT: CPT | Mod: CPTII,S$GLB,, | Performed by: PHYSICIAN ASSISTANT

## 2021-11-03 PROCEDURE — 3044F HG A1C LEVEL LT 7.0%: CPT | Mod: CPTII,S$GLB,, | Performed by: PHYSICIAN ASSISTANT

## 2021-11-03 PROCEDURE — 99999 PR PBB SHADOW E&M-EST. PATIENT-LVL III: ICD-10-PCS | Mod: PBBFAC,,, | Performed by: PHYSICIAN ASSISTANT

## 2021-11-03 PROCEDURE — 3288F FALL RISK ASSESSMENT DOCD: CPT | Mod: CPTII,S$GLB,, | Performed by: PHYSICIAN ASSISTANT

## 2021-11-03 PROCEDURE — 3074F SYST BP LT 130 MM HG: CPT | Mod: CPTII,S$GLB,, | Performed by: PHYSICIAN ASSISTANT

## 2021-11-03 PROCEDURE — 3008F BODY MASS INDEX DOCD: CPT | Mod: CPTII,S$GLB,, | Performed by: PHYSICIAN ASSISTANT

## 2021-11-03 PROCEDURE — 3074F PR MOST RECENT SYSTOLIC BLOOD PRESSURE < 130 MM HG: ICD-10-PCS | Mod: CPTII,S$GLB,, | Performed by: PHYSICIAN ASSISTANT

## 2021-11-03 PROCEDURE — 99214 PR OFFICE/OUTPT VISIT, EST, LEVL IV, 30-39 MIN: ICD-10-PCS | Mod: S$GLB,,, | Performed by: PHYSICIAN ASSISTANT

## 2021-11-03 PROCEDURE — 4010F PR ACE/ARB THEARPY RXD/TAKEN: ICD-10-PCS | Mod: CPTII,S$GLB,, | Performed by: PHYSICIAN ASSISTANT

## 2021-11-03 PROCEDURE — 3078F PR MOST RECENT DIASTOLIC BLOOD PRESSURE < 80 MM HG: ICD-10-PCS | Mod: CPTII,S$GLB,, | Performed by: PHYSICIAN ASSISTANT

## 2021-11-03 RX ORDER — HYDROCODONE BITARTRATE AND ACETAMINOPHEN 5; 325 MG/1; MG/1
1 TABLET ORAL EVERY 8 HOURS PRN
Qty: 90 TABLET | Refills: 0 | Status: SHIPPED | OUTPATIENT
Start: 2021-12-20 | End: 2021-11-22 | Stop reason: SDUPTHER

## 2021-11-03 RX ORDER — HYDROCODONE BITARTRATE AND ACETAMINOPHEN 5; 325 MG/1; MG/1
1 TABLET ORAL EVERY 8 HOURS PRN
Qty: 90 TABLET | Refills: 0 | Status: SHIPPED | OUTPATIENT
Start: 2021-11-21 | End: 2021-11-22 | Stop reason: SDUPTHER

## 2021-11-03 RX ORDER — HYDROCODONE BITARTRATE AND ACETAMINOPHEN 5; 325 MG/1; MG/1
1 TABLET ORAL EVERY 8 HOURS PRN
Qty: 90 TABLET | Refills: 0 | Status: SHIPPED | OUTPATIENT
Start: 2022-01-18 | End: 2021-11-22 | Stop reason: SDUPTHER

## 2021-11-04 ENCOUNTER — TELEPHONE (OUTPATIENT)
Dept: GASTROENTEROLOGY | Facility: CLINIC | Age: 76
End: 2021-11-04
Payer: MEDICARE

## 2021-11-16 ENCOUNTER — PATIENT MESSAGE (OUTPATIENT)
Dept: SURGERY | Facility: AMBULARY SURGERY CENTER | Age: 76
End: 2021-11-16
Payer: MEDICARE

## 2021-11-20 ENCOUNTER — PATIENT MESSAGE (OUTPATIENT)
Dept: SURGERY | Facility: AMBULARY SURGERY CENTER | Age: 76
End: 2021-11-20
Payer: MEDICARE

## 2021-11-20 ENCOUNTER — PATIENT MESSAGE (OUTPATIENT)
Dept: FAMILY MEDICINE | Facility: CLINIC | Age: 76
End: 2021-11-20
Payer: MEDICARE

## 2021-11-20 DIAGNOSIS — G89.4 CHRONIC PAIN DISORDER: ICD-10-CM

## 2021-11-20 DIAGNOSIS — I87.2 STASIS DERMATITIS OF BOTH LEGS: Primary | ICD-10-CM

## 2021-11-22 RX ORDER — HYDROCODONE BITARTRATE AND ACETAMINOPHEN 5; 325 MG/1; MG/1
1 TABLET ORAL EVERY 8 HOURS PRN
Qty: 90 TABLET | Refills: 0 | Status: SHIPPED | OUTPATIENT
Start: 2021-11-22 | End: 2021-12-21

## 2021-11-22 RX ORDER — NYSTATIN 100000 [USP'U]/G
POWDER TOPICAL 2 TIMES DAILY
Qty: 60 G | Refills: 11 | Status: SHIPPED | OUTPATIENT
Start: 2021-11-22 | End: 2022-11-07 | Stop reason: SDUPTHER

## 2021-11-22 RX ORDER — HYDROCODONE BITARTRATE AND ACETAMINOPHEN 5; 325 MG/1; MG/1
1 TABLET ORAL EVERY 8 HOURS PRN
Qty: 90 TABLET | Refills: 0 | Status: SHIPPED | OUTPATIENT
Start: 2022-01-18 | End: 2022-01-31 | Stop reason: SDUPTHER

## 2021-11-22 RX ORDER — HYDROCODONE BITARTRATE AND ACETAMINOPHEN 5; 325 MG/1; MG/1
1 TABLET ORAL EVERY 8 HOURS PRN
Qty: 90 TABLET | Refills: 0 | Status: SHIPPED | OUTPATIENT
Start: 2021-12-20 | End: 2022-01-18

## 2021-11-23 ENCOUNTER — HOSPITAL ENCOUNTER (OUTPATIENT)
Facility: AMBULARY SURGERY CENTER | Age: 76
Discharge: HOME | End: 2021-11-23
Attending: ANESTHESIOLOGY | Admitting: ANESTHESIOLOGY
Payer: MEDICARE

## 2021-11-23 DIAGNOSIS — M47.896 OTHER SPONDYLOSIS, LUMBAR REGION: Primary | ICD-10-CM

## 2021-11-23 LAB — POCT GLUCOSE: 102 MG/DL (ref 70–110)

## 2021-11-23 PROCEDURE — 64635 DESTROY LUMB/SAC FACET JNT: CPT | Mod: LT | Performed by: ANESTHESIOLOGY

## 2021-11-23 PROCEDURE — 64636 DESTROY L/S FACET JNT ADDL: CPT | Mod: RT | Performed by: ANESTHESIOLOGY

## 2021-11-23 PROCEDURE — 64635 DESTROY LUMB/SAC FACET JNT: CPT | Mod: 50,,, | Performed by: ANESTHESIOLOGY

## 2021-11-23 PROCEDURE — 64636 PR DESTROY L/S FACET JNT ADDL: ICD-10-PCS | Mod: 50,,, | Performed by: ANESTHESIOLOGY

## 2021-11-23 PROCEDURE — 64635 PR DESTROY LUMB/SAC FACET JNT: ICD-10-PCS | Mod: 50,,, | Performed by: ANESTHESIOLOGY

## 2021-11-23 PROCEDURE — 64636 DESTROY L/S FACET JNT ADDL: CPT | Mod: 50,,, | Performed by: ANESTHESIOLOGY

## 2021-11-23 RX ORDER — LIDOCAINE HYDROCHLORIDE 20 MG/ML
INJECTION, SOLUTION EPIDURAL; INFILTRATION; INTRACAUDAL; PERINEURAL
Status: DISCONTINUED | OUTPATIENT
Start: 2021-11-23 | End: 2021-11-23 | Stop reason: HOSPADM

## 2021-11-23 RX ORDER — METHYLPREDNISOLONE ACETATE 80 MG/ML
INJECTION, SUSPENSION INTRA-ARTICULAR; INTRALESIONAL; INTRAMUSCULAR; SOFT TISSUE
Status: DISCONTINUED | OUTPATIENT
Start: 2021-11-23 | End: 2021-11-23 | Stop reason: HOSPADM

## 2021-11-23 RX ORDER — FENTANYL CITRATE 50 UG/ML
INJECTION, SOLUTION INTRAMUSCULAR; INTRAVENOUS
Status: DISCONTINUED | OUTPATIENT
Start: 2021-11-23 | End: 2021-11-23 | Stop reason: HOSPADM

## 2021-11-23 RX ORDER — LIDOCAINE HYDROCHLORIDE 10 MG/ML
INJECTION, SOLUTION EPIDURAL; INFILTRATION; INTRACAUDAL; PERINEURAL
Status: DISCONTINUED | OUTPATIENT
Start: 2021-11-23 | End: 2021-11-23 | Stop reason: HOSPADM

## 2021-11-23 RX ORDER — BUPIVACAINE HYDROCHLORIDE 2.5 MG/ML
INJECTION, SOLUTION EPIDURAL; INFILTRATION; INTRACAUDAL
Status: DISCONTINUED | OUTPATIENT
Start: 2021-11-23 | End: 2021-11-23 | Stop reason: HOSPADM

## 2021-11-23 RX ORDER — SODIUM CHLORIDE, SODIUM LACTATE, POTASSIUM CHLORIDE, CALCIUM CHLORIDE 600; 310; 30; 20 MG/100ML; MG/100ML; MG/100ML; MG/100ML
INJECTION, SOLUTION INTRAVENOUS ONCE AS NEEDED
Status: COMPLETED | OUTPATIENT
Start: 2021-11-23 | End: 2021-11-23

## 2021-11-23 RX ORDER — MIDAZOLAM HYDROCHLORIDE 2 MG/2ML
INJECTION, SOLUTION INTRAMUSCULAR; INTRAVENOUS
Status: DISCONTINUED | OUTPATIENT
Start: 2021-11-23 | End: 2021-11-23 | Stop reason: HOSPADM

## 2021-11-23 RX ADMIN — SODIUM CHLORIDE, SODIUM LACTATE, POTASSIUM CHLORIDE, CALCIUM CHLORIDE: 600; 310; 30; 20 INJECTION, SOLUTION INTRAVENOUS at 12:11

## 2021-11-24 VITALS
SYSTOLIC BLOOD PRESSURE: 116 MMHG | OXYGEN SATURATION: 94 % | WEIGHT: 268 LBS | HEART RATE: 57 BPM | DIASTOLIC BLOOD PRESSURE: 56 MMHG | HEIGHT: 66 IN | TEMPERATURE: 98 F | BODY MASS INDEX: 43.07 KG/M2 | RESPIRATION RATE: 18 BRPM

## 2021-12-16 DIAGNOSIS — G89.4 CHRONIC PAIN DISORDER: ICD-10-CM

## 2021-12-16 RX ORDER — HYDROCODONE BITARTRATE AND ACETAMINOPHEN 5; 325 MG/1; MG/1
1 TABLET ORAL EVERY 8 HOURS PRN
Qty: 90 TABLET | Refills: 0 | OUTPATIENT
Start: 2021-12-16 | End: 2022-01-14

## 2021-12-22 ENCOUNTER — PATIENT MESSAGE (OUTPATIENT)
Dept: FAMILY MEDICINE | Facility: CLINIC | Age: 76
End: 2021-12-22
Payer: MEDICARE

## 2021-12-22 ENCOUNTER — PES CALL (OUTPATIENT)
Dept: ADMINISTRATIVE | Facility: CLINIC | Age: 76
End: 2021-12-22
Payer: MEDICARE

## 2021-12-26 ENCOUNTER — NURSE TRIAGE (OUTPATIENT)
Dept: ADMINISTRATIVE | Facility: CLINIC | Age: 76
End: 2021-12-26
Payer: MEDICARE

## 2021-12-27 DIAGNOSIS — E78.2 MIXED HYPERLIPIDEMIA: ICD-10-CM

## 2021-12-27 DIAGNOSIS — D50.9 IRON DEFICIENCY ANEMIA: ICD-10-CM

## 2021-12-27 DIAGNOSIS — I48.0 PAROXYSMAL ATRIAL FIBRILLATION: ICD-10-CM

## 2021-12-27 DIAGNOSIS — I10 ESSENTIAL HYPERTENSION: ICD-10-CM

## 2021-12-27 DIAGNOSIS — I51.9 LV DYSFUNCTION: ICD-10-CM

## 2021-12-27 DIAGNOSIS — K21.9 GASTROESOPHAGEAL REFLUX DISEASE WITHOUT ESOPHAGITIS: Primary | ICD-10-CM

## 2021-12-27 DIAGNOSIS — M10.9 ACUTE GOUT OF FOOT, UNSPECIFIED CAUSE, UNSPECIFIED LATERALITY: ICD-10-CM

## 2021-12-27 DIAGNOSIS — I50.22 CHRONIC SYSTOLIC CONGESTIVE HEART FAILURE: ICD-10-CM

## 2021-12-27 DIAGNOSIS — N18.32 STAGE 3B CHRONIC KIDNEY DISEASE: ICD-10-CM

## 2021-12-27 DIAGNOSIS — I25.10 CORONARY ARTERY DISEASE INVOLVING NATIVE CORONARY ARTERY OF NATIVE HEART WITHOUT ANGINA PECTORIS: Chronic | ICD-10-CM

## 2021-12-27 DIAGNOSIS — Z15.89 MTHFR MUTATION (METHYLENETETRAHYDROFOLATE REDUCTASE): ICD-10-CM

## 2021-12-27 RX ORDER — LISINOPRIL 40 MG/1
40 TABLET ORAL NIGHTLY
Qty: 90 TABLET | Refills: 3 | Status: SHIPPED | OUTPATIENT
Start: 2021-12-27 | End: 2023-01-25 | Stop reason: SDUPTHER

## 2021-12-27 RX ORDER — CLOPIDOGREL BISULFATE 75 MG/1
75 TABLET ORAL DAILY
Qty: 90 TABLET | Refills: 3 | Status: SHIPPED | OUTPATIENT
Start: 2021-12-27 | End: 2023-01-25 | Stop reason: SDUPTHER

## 2021-12-27 RX ORDER — ALLOPURINOL 100 MG/1
100 TABLET ORAL NIGHTLY
Qty: 90 TABLET | Refills: 3 | Status: SHIPPED | OUTPATIENT
Start: 2021-12-27 | End: 2023-01-25 | Stop reason: SDUPTHER

## 2021-12-27 RX ORDER — FERROUS SULFATE 325(65) MG
TABLET ORAL 2 TIMES DAILY
Status: CANCELLED | OUTPATIENT
Start: 2021-12-27

## 2021-12-27 RX ORDER — FERROUS SULFATE 325(65) MG
TABLET ORAL
Qty: 180 TABLET | Refills: 3 | Status: SHIPPED | OUTPATIENT
Start: 2021-12-27 | End: 2023-01-25 | Stop reason: SDUPTHER

## 2021-12-27 RX ORDER — OMEPRAZOLE 20 MG/1
20 CAPSULE, DELAYED RELEASE ORAL DAILY
Qty: 90 CAPSULE | Refills: 3 | Status: ON HOLD | OUTPATIENT
Start: 2021-12-27 | End: 2022-01-09 | Stop reason: HOSPADM

## 2021-12-27 RX ORDER — WARFARIN SODIUM 5 MG/1
TABLET ORAL
Qty: 90 TABLET | Refills: 3 | Status: SHIPPED | OUTPATIENT
Start: 2021-12-27 | End: 2022-05-03 | Stop reason: SDUPTHER

## 2021-12-27 RX ORDER — ATORVASTATIN CALCIUM 80 MG/1
80 TABLET, FILM COATED ORAL DAILY
Qty: 90 TABLET | Refills: 3 | Status: SHIPPED | OUTPATIENT
Start: 2021-12-27 | End: 2023-01-25 | Stop reason: SDUPTHER

## 2021-12-27 RX ORDER — LANOLIN ALCOHOL/MO/W.PET/CERES
1 CREAM (GRAM) TOPICAL DAILY
Qty: 90 TABLET | Refills: 3 | Status: ON HOLD | OUTPATIENT
Start: 2021-12-27 | End: 2022-01-09 | Stop reason: HOSPADM

## 2021-12-27 RX ORDER — TRAZODONE HYDROCHLORIDE 50 MG/1
50 TABLET ORAL NIGHTLY
Qty: 90 TABLET | Refills: 3 | Status: SHIPPED | OUTPATIENT
Start: 2021-12-27 | End: 2023-01-25 | Stop reason: SDUPTHER

## 2021-12-27 RX ORDER — MECOBAL/LEVOMEFOLAT CA/B6 PHOS 2-3-35 MG
1 TABLET ORAL 2 TIMES DAILY
Qty: 180 TABLET | Refills: 3 | Status: ON HOLD | OUTPATIENT
Start: 2021-12-27 | End: 2022-01-09 | Stop reason: HOSPADM

## 2021-12-27 RX ORDER — LANOLIN ALCOHOL/MO/W.PET/CERES
1 CREAM (GRAM) TOPICAL DAILY
Qty: 90 TABLET | Refills: 3 | Status: SHIPPED | OUTPATIENT
Start: 2021-12-27 | End: 2023-01-25 | Stop reason: SDUPTHER

## 2021-12-27 RX ORDER — OMEPRAZOLE 20 MG/1
20 CAPSULE, DELAYED RELEASE ORAL DAILY
Qty: 90 CAPSULE | Refills: 3 | Status: SHIPPED | OUTPATIENT
Start: 2021-12-27 | End: 2022-11-08 | Stop reason: SDUPTHER

## 2021-12-27 RX ORDER — FAMOTIDINE 40 MG/1
40 TABLET, FILM COATED ORAL DAILY
Qty: 90 TABLET | Refills: 3 | Status: SHIPPED | OUTPATIENT
Start: 2021-12-27 | End: 2023-01-25 | Stop reason: SDUPTHER

## 2021-12-27 RX ORDER — MECOBAL/LEVOMEFOLAT CA/B6 PHOS 2-3-35 MG
1 TABLET ORAL 2 TIMES DAILY
Qty: 180 TABLET | Refills: 3 | Status: SHIPPED | OUTPATIENT
Start: 2021-12-27 | End: 2022-11-10 | Stop reason: SDUPTHER

## 2022-01-04 ENCOUNTER — LAB VISIT (OUTPATIENT)
Dept: LAB | Facility: HOSPITAL | Age: 77
End: 2022-01-04
Attending: UROLOGY
Payer: MEDICARE

## 2022-01-04 DIAGNOSIS — C61 PROSTATE CANCER: ICD-10-CM

## 2022-01-04 LAB — COMPLEXED PSA SERPL-MCNC: 0.03 NG/ML (ref 0–4)

## 2022-01-04 PROCEDURE — 36415 COLL VENOUS BLD VENIPUNCTURE: CPT | Performed by: UROLOGY

## 2022-01-04 PROCEDURE — 84153 ASSAY OF PSA TOTAL: CPT | Performed by: UROLOGY

## 2022-01-06 PROBLEM — E86.1 HYPOTENSION DUE TO HYPOVOLEMIA: Status: ACTIVE | Noted: 2018-10-07

## 2022-01-06 PROBLEM — I50.9 CHRONIC CONGESTIVE HEART FAILURE: Status: ACTIVE | Noted: 2022-01-06

## 2022-01-06 PROBLEM — A41.9 CLINICAL SEPSIS: Status: ACTIVE | Noted: 2022-01-06

## 2022-01-06 PROBLEM — Z86.2 H/O HYPERCOAGULABLE STATE: Status: ACTIVE | Noted: 2022-01-06

## 2022-01-08 PROBLEM — R78.81 BACTEREMIA: Status: ACTIVE | Noted: 2022-01-08

## 2022-01-08 PROBLEM — A41.9 CLINICAL SEPSIS: Status: RESOLVED | Noted: 2022-01-06 | Resolved: 2022-01-08

## 2022-01-08 PROBLEM — E87.5 HYPERKALEMIA: Status: RESOLVED | Noted: 2020-11-17 | Resolved: 2022-01-08

## 2022-01-08 PROBLEM — N17.9 AKI (ACUTE KIDNEY INJURY): Chronic | Status: ACTIVE | Noted: 2019-10-19

## 2022-01-08 PROBLEM — E86.1 HYPOTENSION DUE TO HYPOVOLEMIA: Status: RESOLVED | Noted: 2018-10-07 | Resolved: 2022-01-08

## 2022-01-09 PROBLEM — B95.5 BACTEREMIA DUE TO STREPTOCOCCUS: Status: ACTIVE | Noted: 2022-01-08

## 2022-01-10 ENCOUNTER — PATIENT OUTREACH (OUTPATIENT)
Dept: ADMINISTRATIVE | Facility: CLINIC | Age: 77
End: 2022-01-10
Payer: MEDICARE

## 2022-01-10 NOTE — PATIENT INSTRUCTIONS
Patient Education       Sepsis Discharge Instructions, Adult   About this topic   Sepsis is a widespread bloodstream infection. Germs may enter your blood through a cut or open wound. Germs may also enter the blood when you have a medical or dental procedure. Most often, your body is able to fight off these germs. Sometimes, the body is not able to. Then, the germs attack the body's organs and tissues.  This kind of serious infection may cause swelling and blood clotting. It needs to be treated right away. Drugs given through a vein are used to treat sepsis. You may need to be in the hospital, depending on how sick you are.     What care is needed at home?   · Ask your doctor what you need to do when you go home. Make sure you ask questions if you do not understand what the doctor says. This way you will know what you need to do.  · Take all your drugs as ordered, even if you are feeling better.  · Get lots of rest. Sleep when you are feeling tired. Avoid doing tiring activities.  · Clean items and surfaces you often touch like door handles, remotes, and phones. This can help reduce the spread of infection.  · Stay away from people who are sick. Keep away from crowded places until you have fully recovered.  · Wash your hands often with soap and water for at least 20 seconds, especially after coughing or sneezing. Alcohol-based hand sanitizers also work to kill germs.  · Tell other people to wash their hands before they come near you.  · If you are sick, cover your mouth and nose with tissue when coughing or sneezing. You can also cough into your elbow. Throw away tissues in the trash and wash your hands after touching used tissues.  · Keep your hands away from your eyes and nose. Germs can enter these body areas easily.  · Do not get too close (kissing, hugging) to people who are sick.  · Do not share towels or hankies with anyone who is sick.  · Do not smoke.  · Get a flu shot each year.  What follow-up care is  needed?   Your doctor may ask you to make visits to the office to check on your progress. Be sure to keep these visits.  What drugs may be needed?   The doctor may order drugs to:  · Fight an infection  · Decrease swelling  · Prevent blood clots  · Raise blood pressure  · Control blood sugar  Will physical activity be limited?   You may have to limit your activity for a time. Talk to your doctor about the right amount of activity for you.  What problems could happen?   · Lung problems  · Kidney damage  · Heart problems  · Brain problems  What can be done to prevent this health problem?   · Make sure you get all of the shots recommended by the doctor.  · Seek treatment right away if you have an infection.  · Avoid touching wounds or open cuts.  · Wash your hands:  ? Before and after wound care  ? After going to the bathroom  ? Before eating  ? Anytime they are dirty  · Eat a healthy diet. This will improve the body's defense system.  · Avoid smoking and alcohol. This weakens the body's defense system.  When do I need to call the doctor?   · Signs of infection. These include a fever of 100.4°F (38°C) or higher, chills, very sore throat, ear or sinus pain, cough, more sputum or change in color of sputum, pain with passing urine, mouth sores, or a wound that will not heal.  · Changes in mental status  · Loose stools  · Throwing up  · Slow heart rate  · Breathing problems, like feeling short of breath or having pain with breathing  · Bleeding into the skin that looks like tiny bruises or a rash  · You are not feeling better in 1 to 2 weeks or you are feeling worse  Teach Back: Helping You Understand   The Teach Back Method helps you understand the information we are giving you. After you talk with the staff, tell them in your own words what you learned. This helps to make sure the staff has described each thing clearly. It also helps to explain things that may have been confusing. Before going home, make sure you can do  these:  · I can tell you about my condition.  · I can tell you what I will do to keep from getting sick.  · I can tell you how and when to wash my hands to avoid passing the infection to others.  · I can tell you what I will do if I have a fever, chills, trouble breathing, or bleeding into the skin.  Where can I learn more?   [employer]  http://www.nigms.nih.gov/Education/factsheet_sepsis.htm   NHS Choices  http://www.nhs.uk/Conditions/Blood-poisoning/Pages/Introduction.aspx    Centers for Disease Control and Prevention  https://www.cdc.gov/sepsis/basic/qa.html   Last Reviewed Date   2021-08-16  Consumer Information Use and Disclaimer   This information is not specific medical advice and does not replace information you receive from your health care provider. This is only a brief summary of general information. It does NOT include all information about conditions, illnesses, injuries, tests, procedures, treatments, therapies, discharge instructions or life-style choices that may apply to you. You must talk with your health care provider for complete information about your health and treatment options. This information should not be used to decide whether or not to accept your health care providers advice, instructions or recommendations. Only your health care provider has the knowledge and training to provide advice that is right for you.  Copyright   Copyright © 2022 [employer] and its affiliates and/or licensors. All rights reserved.    Griselda teaching reviewed with Citlali Frye . She verbalized understanding.    Education was provided based on the patient's discharge diagnosis using the attached Griselda patient education as a reference.

## 2022-01-10 NOTE — PROGRESS NOTES
C3 nurse spoke with Richard Bustos's daughter, Citlali Frye, for a TCC post hospital discharge follow up call. The patient has a scheduled appointment with TORI Nicolas on 1/19/2022 @ 1100.   Looks like copd exacerbation  Continue oxygen  Neb tx, iv steroid

## 2022-01-19 ENCOUNTER — OFFICE VISIT (OUTPATIENT)
Dept: FAMILY MEDICINE | Facility: CLINIC | Age: 77
End: 2022-01-19
Payer: MEDICARE

## 2022-01-19 VITALS
DIASTOLIC BLOOD PRESSURE: 60 MMHG | TEMPERATURE: 98 F | OXYGEN SATURATION: 98 % | HEART RATE: 62 BPM | WEIGHT: 270.94 LBS | HEIGHT: 66 IN | SYSTOLIC BLOOD PRESSURE: 120 MMHG | BODY MASS INDEX: 43.54 KG/M2

## 2022-01-19 DIAGNOSIS — J15.3 PNEUMONIA DUE TO GROUP B STREPTOCOCCUS, UNSPECIFIED LATERALITY, UNSPECIFIED PART OF LUNG: ICD-10-CM

## 2022-01-19 DIAGNOSIS — N25.81 SECONDARY HYPERPARATHYROIDISM OF RENAL ORIGIN: ICD-10-CM

## 2022-01-19 DIAGNOSIS — E66.01 MORBID OBESITY: ICD-10-CM

## 2022-01-19 DIAGNOSIS — D68.59 HYPERCOAGULABLE STATE: ICD-10-CM

## 2022-01-19 DIAGNOSIS — I71.20 THORACIC AORTIC ANEURYSM WITHOUT RUPTURE: ICD-10-CM

## 2022-01-19 DIAGNOSIS — F03.90 DEMENTIA WITHOUT BEHAVIORAL DISTURBANCE, UNSPECIFIED DEMENTIA TYPE: ICD-10-CM

## 2022-01-19 DIAGNOSIS — Z09 HOSPITAL DISCHARGE FOLLOW-UP: Primary | ICD-10-CM

## 2022-01-19 DIAGNOSIS — I48.0 PAROXYSMAL ATRIAL FIBRILLATION: ICD-10-CM

## 2022-01-19 DIAGNOSIS — C61 PROSTATE CANCER: ICD-10-CM

## 2022-01-19 DIAGNOSIS — I25.119 ATHEROSCLEROSIS OF NATIVE CORONARY ARTERY OF NATIVE HEART WITH ANGINA PECTORIS: ICD-10-CM

## 2022-01-19 PROBLEM — T84.010A: Status: RESOLVED | Noted: 2022-01-19 | Resolved: 2022-01-19

## 2022-01-19 PROBLEM — M47.896 OTHER SPONDYLOSIS, LUMBAR REGION: Status: RESOLVED | Noted: 2020-01-10 | Resolved: 2022-01-19

## 2022-01-19 PROBLEM — R78.81 BACTEREMIA DUE TO STREPTOCOCCUS: Status: RESOLVED | Noted: 2022-01-08 | Resolved: 2022-01-19

## 2022-01-19 PROBLEM — B95.5 BACTEREMIA DUE TO STREPTOCOCCUS: Status: RESOLVED | Noted: 2022-01-08 | Resolved: 2022-01-19

## 2022-01-19 PROBLEM — N17.9 AKI (ACUTE KIDNEY INJURY): Chronic | Status: RESOLVED | Noted: 2019-10-19 | Resolved: 2022-01-19

## 2022-01-19 PROBLEM — T84.010A: Status: ACTIVE | Noted: 2022-01-19

## 2022-01-19 PROBLEM — I50.9 CHRONIC CONGESTIVE HEART FAILURE: Status: RESOLVED | Noted: 2022-01-06 | Resolved: 2022-01-19

## 2022-01-19 PROCEDURE — 99499 RISK ADDL DX/OHS AUDIT: ICD-10-PCS | Mod: S$GLB,,, | Performed by: PHYSICIAN ASSISTANT

## 2022-01-19 PROCEDURE — 3008F PR BODY MASS INDEX (BMI) DOCUMENTED: ICD-10-PCS | Mod: CPTII,S$GLB,, | Performed by: PHYSICIAN ASSISTANT

## 2022-01-19 PROCEDURE — 99999 PR PBB SHADOW E&M-EST. PATIENT-LVL V: CPT | Mod: PBBFAC,,, | Performed by: PHYSICIAN ASSISTANT

## 2022-01-19 PROCEDURE — 3288F PR FALLS RISK ASSESSMENT DOCUMENTED: ICD-10-PCS | Mod: CPTII,S$GLB,, | Performed by: PHYSICIAN ASSISTANT

## 2022-01-19 PROCEDURE — 99495 TCM SERVICES (MODERATE COMPLEXITY): ICD-10-PCS | Mod: S$GLB,,, | Performed by: PHYSICIAN ASSISTANT

## 2022-01-19 PROCEDURE — 1160F RVW MEDS BY RX/DR IN RCRD: CPT | Mod: CPTII,S$GLB,, | Performed by: PHYSICIAN ASSISTANT

## 2022-01-19 PROCEDURE — 3078F DIAST BP <80 MM HG: CPT | Mod: CPTII,S$GLB,, | Performed by: PHYSICIAN ASSISTANT

## 2022-01-19 PROCEDURE — 3288F FALL RISK ASSESSMENT DOCD: CPT | Mod: CPTII,S$GLB,, | Performed by: PHYSICIAN ASSISTANT

## 2022-01-19 PROCEDURE — 1125F AMNT PAIN NOTED PAIN PRSNT: CPT | Mod: CPTII,S$GLB,, | Performed by: PHYSICIAN ASSISTANT

## 2022-01-19 PROCEDURE — 1125F PR PAIN SEVERITY QUANTIFIED, PAIN PRESENT: ICD-10-PCS | Mod: CPTII,S$GLB,, | Performed by: PHYSICIAN ASSISTANT

## 2022-01-19 PROCEDURE — 3008F BODY MASS INDEX DOCD: CPT | Mod: CPTII,S$GLB,, | Performed by: PHYSICIAN ASSISTANT

## 2022-01-19 PROCEDURE — 1159F PR MEDICATION LIST DOCUMENTED IN MEDICAL RECORD: ICD-10-PCS | Mod: CPTII,S$GLB,, | Performed by: PHYSICIAN ASSISTANT

## 2022-01-19 PROCEDURE — 3074F PR MOST RECENT SYSTOLIC BLOOD PRESSURE < 130 MM HG: ICD-10-PCS | Mod: CPTII,S$GLB,, | Performed by: PHYSICIAN ASSISTANT

## 2022-01-19 PROCEDURE — 3078F PR MOST RECENT DIASTOLIC BLOOD PRESSURE < 80 MM HG: ICD-10-PCS | Mod: CPTII,S$GLB,, | Performed by: PHYSICIAN ASSISTANT

## 2022-01-19 PROCEDURE — 1101F PR PT FALLS ASSESS DOC 0-1 FALLS W/OUT INJ PAST YR: ICD-10-PCS | Mod: CPTII,S$GLB,, | Performed by: PHYSICIAN ASSISTANT

## 2022-01-19 PROCEDURE — 99999 PR PBB SHADOW E&M-EST. PATIENT-LVL V: ICD-10-PCS | Mod: PBBFAC,,, | Performed by: PHYSICIAN ASSISTANT

## 2022-01-19 PROCEDURE — 1160F PR REVIEW ALL MEDS BY PRESCRIBER/CLIN PHARMACIST DOCUMENTED: ICD-10-PCS | Mod: CPTII,S$GLB,, | Performed by: PHYSICIAN ASSISTANT

## 2022-01-19 PROCEDURE — 3074F SYST BP LT 130 MM HG: CPT | Mod: CPTII,S$GLB,, | Performed by: PHYSICIAN ASSISTANT

## 2022-01-19 PROCEDURE — 3044F HG A1C LEVEL LT 7.0%: CPT | Mod: CPTII,S$GLB,, | Performed by: PHYSICIAN ASSISTANT

## 2022-01-19 PROCEDURE — 1101F PT FALLS ASSESS-DOCD LE1/YR: CPT | Mod: CPTII,S$GLB,, | Performed by: PHYSICIAN ASSISTANT

## 2022-01-19 PROCEDURE — 1159F MED LIST DOCD IN RCRD: CPT | Mod: CPTII,S$GLB,, | Performed by: PHYSICIAN ASSISTANT

## 2022-01-19 PROCEDURE — 99495 TRANSJ CARE MGMT MOD F2F 14D: CPT | Mod: S$GLB,,, | Performed by: PHYSICIAN ASSISTANT

## 2022-01-19 PROCEDURE — 3044F PR MOST RECENT HEMOGLOBIN A1C LEVEL <7.0%: ICD-10-PCS | Mod: CPTII,S$GLB,, | Performed by: PHYSICIAN ASSISTANT

## 2022-01-19 PROCEDURE — 99499 UNLISTED E&M SERVICE: CPT | Mod: S$GLB,,, | Performed by: PHYSICIAN ASSISTANT

## 2022-01-19 NOTE — PROGRESS NOTES
Subjective:       Patient ID: Richard Bustos is a 76 y.o. male.    Chief Complaint: Hospital Follow Up    Patient presents for a TCC visit post hospital discharge on January 10, 2022.  His hospital course as follows:    HPI:   76-year-old male with dementia, Prostate CA, coronary artery disease S/P Stent placement, CHF unknown EF, Hypercoag state with PTHFR mutation, and diabetes who presents emergency department via EMS with altered mental status that started this morning.  He has been febrile as well, cough clear sputum.  He denies any chest pain but he is having generalized pain.  Family is concerned he may have COVID.  He is fully vaccinated.  He denies any shortness of breath.  No sick contacts. Poor historian no family at bedside lives with his daughter Citlali. No N/V/D,     Hospital Course:   Patient was admitted for acute metabolic encephalopathy and clinical sepsis.  Patient initially admitted to tele but became hypotensive and transferred to ICU.  Patient was started on broad-spectrum antibiotic and Levophed.  Patient was wean off Levophed after 24 hours in ICU and remained stable.  Leukocytosis resolved and blood culture grew Streptococcus.  Patient remained hemodynamically stable for the past 48 hours and will be discharged with clindamycin and probiotic.  Patient will need to follow-up with PCP for further care.      Patient is on his last day of clindamycin and is continuing to take the probiotic.  He states he feels markedly better and denies any shortness of breath or cough.  He denies any fever.    Transitional Care Note    Family and/or Caretaker present at visit?  Yes.  Diagnostic tests reviewed/disposition: No diagnosic tests pending after this hospitalization.  Disease/illness education: yes  Home health/community services discussion/referrals: Patient does not have home health established from hospital visit.  They do not need home health.  If needed, we will set up home health for the patient.    Establishment or re-establishment of referral orders for community resources: No other necessary community resources.   Discussion with other health care providers: No discussion with other health care providers necessary.       EXAMINATION:  XR CHEST AP PORTABLE     CLINICAL HISTORY:  Sepsis;     TECHNIQUE:  Single frontal view of the chest was performed.     COMPARISON:  10/18/2019     FINDINGS:  The cardiomediastinal silhouette is prominent, similar to the previous examination.  There is no pleural effusion.  The trachea is midline.  The lungs are symmetrically expanded bilaterally with coarse interstitial attenuation in a perihilar distribution suggesting edema although viral process can present in this fashion, correlation is needed..  No large focal consolidation.  There is no pneumothorax.  The osseous structures are remarkable for degenerative change..     Impression:     As above        Electronically signed by: Fito Mayo MD  Date:                                            01/06/2022  Time:                                           17:23      Patient Active Problem List   Diagnosis    Diabetes mellitus with chronic kidney disease, stage III    MTHFR mutation (methylenetetrahydrofolate reductase)    Sleep apnea, using BiPAP nightly, 1999, last sleep test in 2013    Hypertension associated with diabetes    Atherosclerosis of native coronary artery with angina pectoris    Hyperlipidemia associated with type 2 diabetes mellitus    Gout    GERD (gastroesophageal reflux disease)    Hypercoagulable state, Prothrombin mutation    Colon polyps    Anxiety    Morbid obesity, BMI 42.8    Carotid disease, bilateral    Aortic aneurysm, thoracic, 4.3 cm, 8/2010    CKD (chronic kidney disease) stage 3, GFR 30-59 ml/min, 41    LV dysfunction, EF 40%    Long term (current) use of anticoagulants    OA (osteoarthritis). post bilateral THR, 2001    Diabetic neuropathy    H/O bariatric surgery, 2008     Osteoarthritis, knee    BPH with obstruction/lower urinary tract symptoms    Proteinuria    Paroxysmal atrial fibrillation    Stasis dermatitis of both legs    Hypertensive left ventricular hypertrophy, without heart failure    Prostate cancer    Peripheral vascular disease    Facet arthropathy    Anemia due to stage 3 chronic kidney disease    Uncomplicated opioid dependence    MCI (mild cognitive impairment)    Cardiomyopathy    History of MI (myocardial infarction), 2010    Status post insertion of drug eluting coronary artery stent, 3x, last RCA 1/2015    Chronic systolic congestive heart failure    Back pain of lumbosacral region with sciatica    Bradycardia, drug induced    OAB (overactive bladder)    Urge incontinence    Complex renal cyst    Degenerative disc disease, lumbar    Hypoalbuminemia    Warfarin anticoagulation    Dementia without behavioral disturbance    History of arteriovenous malformation (AVM)    H/O hypercoagulable state    Secondary hyperparathyroidism of renal origin         Current Outpatient Medications:     albuterol (VENTOLIN HFA) 90 mcg/actuation inhaler, Inhale 2 puffs into the lungs every 6 (six) hours as needed for Wheezing or Shortness of Breath. Rescue, Disp: 1 g, Rfl: 2    allopurinoL (ZYLOPRIM) 100 MG tablet, Take 1 tablet (100 mg total) by mouth every evening., Disp: 90 tablet, Rfl: 3    aspirin (ECOTRIN) 81 MG EC tablet, Take 81 mg by mouth once daily., Disp: , Rfl:     atorvastatin (LIPITOR) 80 MG tablet, Take 1 tablet (80 mg total) by mouth once daily., Disp: 90 tablet, Rfl: 3    calcitRIOL (ROCALTROL) 0.5 MCG Cap, 0.75 mcg once daily. , Disp: , Rfl: 4    carvediloL (COREG) 25 MG tablet, Take 1 tablet (25 mg total) by mouth 2 (two) times daily with meals., Disp: 180 tablet, Rfl: 3    ciclopirox (LOPROX) 0.77 % Crea, Apply topically 2 (two) times daily., Disp: 90 g, Rfl: 2    ciclopirox (PENLAC) 8 % Soln, Apply topically nightly., Disp:  6.6 mL, Rfl: 11    ciclopirox 0.77 % Gel, Apply topically 2 (two) times daily., Disp: 100 g, Rfl: 3    clindamycin (CLEOCIN) 300 MG capsule, Take 1 capsule (300 mg total) by mouth 3 (three) times daily. for 10 days, Disp: 30 capsule, Rfl: 0    clopidogreL (PLAVIX) 75 mg tablet, Take 1 tablet (75 mg total) by mouth once daily., Disp: 90 tablet, Rfl: 3    famotidine (PEPCID) 40 MG tablet, Take 1 tablet (40 mg total) by mouth once daily., Disp: 90 tablet, Rfl: 3    ferrous sulfate (FEROSUL) 325 mg (65 mg iron) Tab tablet, TAKE 1 TABLET BY MOUTH TWICE DAILY, Disp: 180 tablet, Rfl: 3    fluticasone propionate (FLONASE) 50 mcg/actuation nasal spray, SHAKE LIQUID AND USE 2 SPRAYS(100 MCG) IN EACH NOSTRIL EVERY DAY, Disp: 16 g, Rfl: 5    FOLIC ACID/MULTIVIT-MIN/LUTEIN (CENTRUM SILVER ORAL), Take 1 tablet by mouth once daily., Disp: , Rfl:     HYDROcodone-acetaminophen (NORCO) 5-325 mg per tablet, Take 1 tablet by mouth every 8 (eight) hours as needed for Pain. Medically necessary for greater than 7 days for chronic pain, Disp: 90 tablet, Rfl: 0    hydrocortisone 2.5 % cream, Apply topically 2 (two) times daily., Disp: 1 Tube, Rfl: 0    Lactobacillus acidoph-pectin (ACIDOPHILUS-PECTIN) 75 million cell -100 mg Cap, Take 1 capsule by mouth 2 (two) times a day. for 14 days, Disp: 28 capsule, Rfl: 0    LIDOcaine HCL 2% (XYLOCAINE) 2 % jelly, Apply topically as needed. Apply topically once nightly to affected part of foot/feet., Disp: 30 mL, Rfl: 2    lisinopriL (PRINIVIL,ZESTRIL) 40 MG tablet, Take 1 tablet (40 mg total) by mouth every evening., Disp: 90 tablet, Rfl: 3    magnesium oxide (MAG-OX) 400 mg (241.3 mg magnesium) tablet, Take 1 tablet (400 mg total) by mouth once daily., Disp: 90 tablet, Rfl: 3    mecobal-levomefolat Ca-B6 phos (L-METHYL-B6-B12) 3-35-2 mg Tab, Take 1 tablet by mouth 2 (two) times daily., Disp: 180 tablet, Rfl: 3    mirabegron (MYRBETRIQ) 50 mg Tb24, Take 1 tablet (50 mg total) by mouth  once daily., Disp: 90 tablet, Rfl: 3    nitroGLYCERIN 0.4 MG/DOSE TL SPRY (NITROLINGUAL) 400 mcg/spray spray, PLACE 1 SPRAY UNDER THE TONGUE EVERY 5 MINUTES AS NEEDED FOR CHEST PAIN, Disp: 4.9 g, Rfl: 9    nystatin (MYCOSTATIN) powder, Apply topically 2 (two) times daily., Disp: 60 g, Rfl: 11    omeprazole (PRILOSEC) 20 MG capsule, Take 1 capsule (20 mg total) by mouth once daily., Disp: 90 capsule, Rfl: 3    prothrombin time/INR test metr Misc, 1 Stick by Misc.(Non-Drug; Combo Route) route every 30 days., Disp: 1 each, Rfl: 0    traZODone (DESYREL) 50 MG tablet, Take 1 tablet (50 mg total) by mouth every evening., Disp: 90 tablet, Rfl: 3    vit A/vit C/vit E/zinc/copper (PRESERVISION AREDS ORAL), Take by mouth 2 (two) times a day. , Disp: , Rfl:     warfarin (COUMADIN) 5 MG tablet, 5 mg except on wednesday and saturday Wed and sat 2.5 mgs, Disp: 90 tablet, Rfl: 3  No current facility-administered medications for this visit.    Facility-Administered Medications Ordered in Other Visits:     lactated ringers infusion, , Intravenous, Once PRN, Evangelist Bose MD    Review of Systems   Constitutional: Negative for activity change, appetite change, chills, fatigue, fever and unexpected weight change.   HENT: Negative.    Eyes: Negative for photophobia, pain, discharge, redness and visual disturbance.   Respiratory: Negative for cough, chest tightness and shortness of breath.    Cardiovascular: Negative for chest pain, palpitations and leg swelling.   Gastrointestinal: Negative for abdominal distention, abdominal pain, blood in stool, constipation, diarrhea, nausea and vomiting.   Genitourinary: Negative for decreased urine volume, difficulty urinating, dysuria, frequency, hematuria and urgency.   Musculoskeletal: Negative for arthralgias, back pain, gait problem and myalgias.   Neurological: Negative for dizziness, weakness, light-headedness, numbness and headaches.         Objective:      Physical  Exam  Constitutional:       General: He is not in acute distress.     Appearance: He is well-developed and well-nourished. He is not diaphoretic.   HENT:      Head: Normocephalic and atraumatic.   Eyes:      Conjunctiva/sclera: Conjunctivae normal.      Pupils: Pupils are equal, round, and reactive to light.   Neck:      Thyroid: No thyromegaly.      Vascular: No JVD.   Cardiovascular:      Rate and Rhythm: Normal rate and regular rhythm.      Heart sounds: No murmur heard.  No friction rub. No gallop.    Pulmonary:      Effort: Pulmonary effort is normal. No respiratory distress.      Breath sounds: No wheezing or rales.   Chest:      Chest wall: No tenderness.   Musculoskeletal:      Cervical back: Normal range of motion and neck supple.   Neurological:      Mental Status: He is alert and oriented to person, place, and time.         Assessment:       Problem List Items Addressed This Visit     Aortic aneurysm, thoracic, 4.3 cm, 8/2010 (Chronic)    Atherosclerosis of native coronary artery with angina pectoris (Chronic)    Dementia without behavioral disturbance    Hypercoagulable state, Prothrombin mutation    Morbid obesity, BMI 42.8    Paroxysmal atrial fibrillation    Prostate cancer    Secondary hyperparathyroidism of renal origin      Other Visit Diagnoses     Hospital discharge follow-up    -  Primary    Pneumonia due to group B Streptococcus, unspecified laterality, unspecified part of lung        Improving, finish medication    Relevant Orders    X-Ray Chest PA And Lateral          Plan:       Richard was seen today for hospital follow up.    Diagnoses and all orders for this visit:    Hospital discharge follow-up    Pneumonia due to group B Streptococcus, unspecified laterality, unspecified part of lung  Comments:  Improving, finish medication  Orders:  -     X-Ray Chest PA And Lateral; Future    Secondary hyperparathyroidism of renal origin  Comments:  Stable, continue to monitor    Prostate  cancer  Comments:  Stable, continue to follow    Morbid obesity  Comments:  Uncontrolled, encourage diet and exercise    Hypercoagulable state  Comments:  Stable, continue to follow anticoagulation    Atherosclerosis of native coronary artery of native heart with angina pectoris  Comments:  Stable, continue regimen    Paroxysmal atrial fibrillation  Comments:  Controlled, continue current regimen    Thoracic aortic aneurysm without rupture  Comments:  Stable, continue to monitor    Dementia without behavioral disturbance, unspecified dementia type  Comments:  Stable, continue to monitor    Finish medication from the hospital.  Will get a chest x-ray to ensure that the pneumonia is resolving.  If it is not completely resolved on x-ray we will repeat the x-ray in about 3-4 weeks to ensure complete resolution.  Patient expressed understanding.

## 2022-01-24 PROBLEM — I70.0 ABDOMINAL AORTIC ATHEROSCLEROSIS: Status: ACTIVE | Noted: 2022-01-24

## 2022-01-25 ENCOUNTER — OFFICE VISIT (OUTPATIENT)
Dept: UROLOGY | Facility: CLINIC | Age: 77
End: 2022-01-25
Payer: MEDICARE

## 2022-01-25 VITALS
BODY MASS INDEX: 43.39 KG/M2 | HEIGHT: 66 IN | RESPIRATION RATE: 18 BRPM | DIASTOLIC BLOOD PRESSURE: 74 MMHG | HEART RATE: 58 BPM | WEIGHT: 270 LBS | SYSTOLIC BLOOD PRESSURE: 136 MMHG

## 2022-01-25 DIAGNOSIS — C61 PROSTATE CANCER: Primary | ICD-10-CM

## 2022-01-25 DIAGNOSIS — N28.1 COMPLEX RENAL CYST: ICD-10-CM

## 2022-01-25 LAB — POC RESIDUAL URINE VOLUME: 197 ML (ref 0–100)

## 2022-01-25 PROCEDURE — 3075F PR MOST RECENT SYSTOLIC BLOOD PRESS GE 130-139MM HG: ICD-10-PCS | Mod: CPTII,S$GLB,, | Performed by: UROLOGY

## 2022-01-25 PROCEDURE — 99999 PR PBB SHADOW E&M-EST. PATIENT-LVL III: ICD-10-PCS | Mod: PBBFAC,,, | Performed by: UROLOGY

## 2022-01-25 PROCEDURE — 1101F PR PT FALLS ASSESS DOC 0-1 FALLS W/OUT INJ PAST YR: ICD-10-PCS | Mod: CPTII,S$GLB,, | Performed by: UROLOGY

## 2022-01-25 PROCEDURE — 3078F DIAST BP <80 MM HG: CPT | Mod: CPTII,S$GLB,, | Performed by: UROLOGY

## 2022-01-25 PROCEDURE — 1159F PR MEDICATION LIST DOCUMENTED IN MEDICAL RECORD: ICD-10-PCS | Mod: CPTII,S$GLB,, | Performed by: UROLOGY

## 2022-01-25 PROCEDURE — 3008F PR BODY MASS INDEX (BMI) DOCUMENTED: ICD-10-PCS | Mod: CPTII,S$GLB,, | Performed by: UROLOGY

## 2022-01-25 PROCEDURE — 3078F PR MOST RECENT DIASTOLIC BLOOD PRESSURE < 80 MM HG: ICD-10-PCS | Mod: CPTII,S$GLB,, | Performed by: UROLOGY

## 2022-01-25 PROCEDURE — 51798 US URINE CAPACITY MEASURE: CPT | Mod: S$GLB,,, | Performed by: UROLOGY

## 2022-01-25 PROCEDURE — 3288F PR FALLS RISK ASSESSMENT DOCUMENTED: ICD-10-PCS | Mod: CPTII,S$GLB,, | Performed by: UROLOGY

## 2022-01-25 PROCEDURE — 99213 PR OFFICE/OUTPT VISIT, EST, LEVL III, 20-29 MIN: ICD-10-PCS | Mod: S$GLB,,, | Performed by: UROLOGY

## 2022-01-25 PROCEDURE — 1125F AMNT PAIN NOTED PAIN PRSNT: CPT | Mod: CPTII,S$GLB,, | Performed by: UROLOGY

## 2022-01-25 PROCEDURE — 1111F PR DISCHARGE MEDS RECONCILED W/ CURRENT OUTPATIENT MED LIST: ICD-10-PCS | Mod: CPTII,S$GLB,, | Performed by: UROLOGY

## 2022-01-25 PROCEDURE — 3288F FALL RISK ASSESSMENT DOCD: CPT | Mod: CPTII,S$GLB,, | Performed by: UROLOGY

## 2022-01-25 PROCEDURE — 3008F BODY MASS INDEX DOCD: CPT | Mod: CPTII,S$GLB,, | Performed by: UROLOGY

## 2022-01-25 PROCEDURE — 1159F MED LIST DOCD IN RCRD: CPT | Mod: CPTII,S$GLB,, | Performed by: UROLOGY

## 2022-01-25 PROCEDURE — 3044F PR MOST RECENT HEMOGLOBIN A1C LEVEL <7.0%: ICD-10-PCS | Mod: CPTII,S$GLB,, | Performed by: UROLOGY

## 2022-01-25 PROCEDURE — 1111F DSCHRG MED/CURRENT MED MERGE: CPT | Mod: CPTII,S$GLB,, | Performed by: UROLOGY

## 2022-01-25 PROCEDURE — 3075F SYST BP GE 130 - 139MM HG: CPT | Mod: CPTII,S$GLB,, | Performed by: UROLOGY

## 2022-01-25 PROCEDURE — 3044F HG A1C LEVEL LT 7.0%: CPT | Mod: CPTII,S$GLB,, | Performed by: UROLOGY

## 2022-01-25 PROCEDURE — 51798 POCT BLADDER SCAN: ICD-10-PCS | Mod: S$GLB,,, | Performed by: UROLOGY

## 2022-01-25 PROCEDURE — 99213 OFFICE O/P EST LOW 20 MIN: CPT | Mod: S$GLB,,, | Performed by: UROLOGY

## 2022-01-25 PROCEDURE — 1101F PT FALLS ASSESS-DOCD LE1/YR: CPT | Mod: CPTII,S$GLB,, | Performed by: UROLOGY

## 2022-01-25 PROCEDURE — 1125F PR PAIN SEVERITY QUANTIFIED, PAIN PRESENT: ICD-10-PCS | Mod: CPTII,S$GLB,, | Performed by: UROLOGY

## 2022-01-25 PROCEDURE — 99999 PR PBB SHADOW E&M-EST. PATIENT-LVL III: CPT | Mod: PBBFAC,,, | Performed by: UROLOGY

## 2022-01-25 NOTE — PROGRESS NOTES
[facility] Note - [place]  Staff: MD Isa  PCP: Familia Ramirez Jr, MD  Date of Service: 01/25/2022      Subjective:        HPI: Richard Bustos is a 76 y.o. male     Interval history 1/21/21:  He has a h/o UUI but was only voiding 4x a day. Was having 1 leak a day if he held it too long. Had put him on myrbetriq 50mg which helped some? Today he states wears thin pad (1 a day) with occ leaks unchanged and voids about 4x a day with rare urgency if he holds too long again.     He has a h/o Gl 4 prostate cancer sp XRT completed in 2016 and ADT x 2.5 years. Most recent psa <0.1.    Also has a h/o complex right renal cyst. Last rbus 1/14/2021 shows  There are a couple of simple renal cyst present with the largest measuring 5.6 x 4.4 x 4.4 cm.  Within the lower pole, there appears to be a complex hypoechoic cyst measuring 2.8 x 3.4 x 4.7 cm in size, not significantly changed when compared to the prior study when this measured 4.2 x 4.1 x 4.5 cm.  No renal stone. No hydronephrosis. Left side previously showed stone but this one did not.     I reviewed his previous urinalysis and cultures as well as his urinalysis today. His urinalysis today shows no abnormalities except 100 protein.     Interval history 10/14/21:  · psa was 0.01 on 1/28/21  · 3/24/21 restart mybetriq 50mg once daily again. Had stopped bc unsure if it was helping but appears it was helping since daughter states leakage and frequency worsened off of it.   · 4/26/21 saw marin and pvr: 0. Still having reanna urgency but went over some basic recs.   · psa 0.02 on 7/29, then 8/18 then 10/7 (not sure it was checked so manytimes)    Interval history 1/25/22:   On myrbetriq 50mg daily no longer leaking urine. No urine frequency.    rbus 1/14/21 for RLP complex cyst: complex hypoechoic cyst measuring 2.8 x 3.4 x 4.7 cm in size, not significantly changed since June 2020 when compared to the prior study when this measured 4.2 x 4.1 x  4.5 cm. b simple renal cysts.    psa 1/4/22 0.03 (rising)   Just released from hospital for sepsis secondary to strep (ua was neg)         Current REVIEW OF SYSTEMS:  He says having R hip drained next week.     PSA History: no fam hx of prostate cancer   1/25/22 Pvr: 197 but voided 2 hours ago  1/4/22  0.03  Component PSA Diagnostic   Latest Ref Rng & Units 0.00 - 4.00 ng/mL   10/7/2021 0.02   8/18/2021 0.02   7/29/2021 0.02   1/28/2021 0.01   1/14/2021 <0.10   6/4/2020 <0.10   12/2/2019 <0.10   5/10/2019 <0.10   4/24/2019 <0.01   4/23/2019 <0.10   1/15/2019 <0.01   10/16/2018 <0.01   7/5/2018 <0.01   1/4/2018 <0.01   11/21/2017 6/12/2017 <0.01   12/12/2016  Gl 4 prostate cancer sp XRT completed in 2016 and ADT x 2.5 years <0.01     8/1/2016 3.3   3/18/2016 7.7 (H)     Urine history:   1/6/22 1+prot    Objective:     Vitals:    01/25/22 1407   BP: 136/74   Pulse: (!) 58   Resp: 18         Assessment:     Richard Bustos is a 76 y.o. male with     Gl 8 prostate cancer s/p XRT in 2016 + ADT x 2.5 years  psa rising slowly, now 0.03    oab managed with myrbetriq    RLP complex kidney cyst unchanged since June 2020     1. Prostate cancer    2. Complex renal cyst          Plan:      Continue myrbetriq 50mg daily    rbus in 2 years (jan 204)  to monitor RLP cyst since unchanged in 2 years.    psa diagnostic in 3month (done here not [facility]) since rising slowly     F/u in 3 months with diagnostic psa prior (standing orders until 2024)      Adeline Moss MD

## 2022-01-25 NOTE — PATIENT INSTRUCTIONS
Richard Bustos is a 76 y.o. male with     Gl 8 prostate cancer s/p XRT in 2016 + ADT x 2.5 years  psa rising slowly, now 0.03    oab managed with myrbetriq    RLP complex kidney cyst unchanged since June 2020     1. Prostate cancer    2. Complex renal cyst          Plan:      Continue myrbetriq 50mg daily    rbus in 2 years (jan 204)  to monitor RLP cyst since unchanged in 2 years.    psa diagnostic in 3month (done here not [facility]) since rising slowly     F/u in 3 months with diagnostic psa prior (standing orders until 2024)

## 2022-01-27 ENCOUNTER — OFFICE VISIT (OUTPATIENT)
Dept: FAMILY MEDICINE | Facility: CLINIC | Age: 77
End: 2022-01-27
Payer: MEDICARE

## 2022-01-27 VITALS
HEIGHT: 66 IN | BODY MASS INDEX: 42.38 KG/M2 | HEART RATE: 61 BPM | DIASTOLIC BLOOD PRESSURE: 72 MMHG | SYSTOLIC BLOOD PRESSURE: 128 MMHG | TEMPERATURE: 98 F | WEIGHT: 263.69 LBS | OXYGEN SATURATION: 98 %

## 2022-01-27 DIAGNOSIS — R26.9 ABNORMALITY OF GAIT AND MOBILITY: ICD-10-CM

## 2022-01-27 DIAGNOSIS — I48.0 PAROXYSMAL ATRIAL FIBRILLATION: ICD-10-CM

## 2022-01-27 DIAGNOSIS — N18.30 ANEMIA DUE TO STAGE 3 CHRONIC KIDNEY DISEASE, UNSPECIFIED WHETHER STAGE 3A OR 3B CKD: ICD-10-CM

## 2022-01-27 DIAGNOSIS — E11.42 DIABETIC POLYNEUROPATHY ASSOCIATED WITH TYPE 2 DIABETES MELLITUS: ICD-10-CM

## 2022-01-27 DIAGNOSIS — Z74.09 OTHER REDUCED MOBILITY: ICD-10-CM

## 2022-01-27 DIAGNOSIS — Z00.00 ENCOUNTER FOR PREVENTIVE HEALTH EXAMINATION: Primary | ICD-10-CM

## 2022-01-27 DIAGNOSIS — F11.20 UNCOMPLICATED OPIOID DEPENDENCE: ICD-10-CM

## 2022-01-27 DIAGNOSIS — I50.22 CHRONIC SYSTOLIC CONGESTIVE HEART FAILURE: ICD-10-CM

## 2022-01-27 DIAGNOSIS — E11.59 HYPERTENSION ASSOCIATED WITH DIABETES: ICD-10-CM

## 2022-01-27 DIAGNOSIS — D63.1 ANEMIA DUE TO STAGE 3 CHRONIC KIDNEY DISEASE, UNSPECIFIED WHETHER STAGE 3A OR 3B CKD: ICD-10-CM

## 2022-01-27 DIAGNOSIS — E11.69 HYPERLIPIDEMIA ASSOCIATED WITH TYPE 2 DIABETES MELLITUS: ICD-10-CM

## 2022-01-27 DIAGNOSIS — I73.9 PERIPHERAL VASCULAR DISEASE: ICD-10-CM

## 2022-01-27 DIAGNOSIS — N18.32 STAGE 3B CHRONIC KIDNEY DISEASE: ICD-10-CM

## 2022-01-27 DIAGNOSIS — I65.23 BILATERAL CAROTID ARTERY STENOSIS: Chronic | ICD-10-CM

## 2022-01-27 DIAGNOSIS — E78.5 HYPERLIPIDEMIA ASSOCIATED WITH TYPE 2 DIABETES MELLITUS: ICD-10-CM

## 2022-01-27 DIAGNOSIS — I15.2 HYPERTENSION ASSOCIATED WITH DIABETES: ICD-10-CM

## 2022-01-27 DIAGNOSIS — F03.90 DEMENTIA WITHOUT BEHAVIORAL DISTURBANCE, UNSPECIFIED DEMENTIA TYPE: ICD-10-CM

## 2022-01-27 DIAGNOSIS — I70.0 ABDOMINAL AORTIC ATHEROSCLEROSIS: ICD-10-CM

## 2022-01-27 PROCEDURE — 3008F PR BODY MASS INDEX (BMI) DOCUMENTED: ICD-10-PCS | Mod: CPTII,S$GLB,, | Performed by: NURSE PRACTITIONER

## 2022-01-27 PROCEDURE — 99499 UNLISTED E&M SERVICE: CPT | Mod: S$GLB,,, | Performed by: NURSE PRACTITIONER

## 2022-01-27 PROCEDURE — G0008 FLU VACCINE - QUADRIVALENT - ADJUVANTED: ICD-10-PCS | Mod: S$GLB,,, | Performed by: NURSE PRACTITIONER

## 2022-01-27 PROCEDURE — G0439 PPPS, SUBSEQ VISIT: HCPCS | Mod: S$GLB,,, | Performed by: NURSE PRACTITIONER

## 2022-01-27 PROCEDURE — 3044F PR MOST RECENT HEMOGLOBIN A1C LEVEL <7.0%: ICD-10-PCS | Mod: CPTII,S$GLB,, | Performed by: NURSE PRACTITIONER

## 2022-01-27 PROCEDURE — G0439 PR MEDICARE ANNUAL WELLNESS SUBSEQUENT VISIT: ICD-10-PCS | Mod: S$GLB,,, | Performed by: NURSE PRACTITIONER

## 2022-01-27 PROCEDURE — 3288F FALL RISK ASSESSMENT DOCD: CPT | Mod: CPTII,S$GLB,, | Performed by: NURSE PRACTITIONER

## 2022-01-27 PROCEDURE — 1125F AMNT PAIN NOTED PAIN PRSNT: CPT | Mod: CPTII,S$GLB,, | Performed by: NURSE PRACTITIONER

## 2022-01-27 PROCEDURE — 3078F PR MOST RECENT DIASTOLIC BLOOD PRESSURE < 80 MM HG: ICD-10-PCS | Mod: CPTII,S$GLB,, | Performed by: NURSE PRACTITIONER

## 2022-01-27 PROCEDURE — 3008F BODY MASS INDEX DOCD: CPT | Mod: CPTII,S$GLB,, | Performed by: NURSE PRACTITIONER

## 2022-01-27 PROCEDURE — 1101F PT FALLS ASSESS-DOCD LE1/YR: CPT | Mod: CPTII,S$GLB,, | Performed by: NURSE PRACTITIONER

## 2022-01-27 PROCEDURE — 90694 FLU VACCINE - QUADRIVALENT - ADJUVANTED: ICD-10-PCS | Mod: S$GLB,,, | Performed by: NURSE PRACTITIONER

## 2022-01-27 PROCEDURE — 3074F PR MOST RECENT SYSTOLIC BLOOD PRESSURE < 130 MM HG: ICD-10-PCS | Mod: CPTII,S$GLB,, | Performed by: NURSE PRACTITIONER

## 2022-01-27 PROCEDURE — 3074F SYST BP LT 130 MM HG: CPT | Mod: CPTII,S$GLB,, | Performed by: NURSE PRACTITIONER

## 2022-01-27 PROCEDURE — 1125F PR PAIN SEVERITY QUANTIFIED, PAIN PRESENT: ICD-10-PCS | Mod: CPTII,S$GLB,, | Performed by: NURSE PRACTITIONER

## 2022-01-27 PROCEDURE — G0008 ADMIN INFLUENZA VIRUS VAC: HCPCS | Mod: S$GLB,,, | Performed by: NURSE PRACTITIONER

## 2022-01-27 PROCEDURE — 99499 RISK ADDL DX/OHS AUDIT: ICD-10-PCS | Mod: S$GLB,,, | Performed by: NURSE PRACTITIONER

## 2022-01-27 PROCEDURE — 3044F HG A1C LEVEL LT 7.0%: CPT | Mod: CPTII,S$GLB,, | Performed by: NURSE PRACTITIONER

## 2022-01-27 PROCEDURE — 3078F DIAST BP <80 MM HG: CPT | Mod: CPTII,S$GLB,, | Performed by: NURSE PRACTITIONER

## 2022-01-27 PROCEDURE — 99999 PR PBB SHADOW E&M-EST. PATIENT-LVL IV: ICD-10-PCS | Mod: PBBFAC,,, | Performed by: NURSE PRACTITIONER

## 2022-01-27 PROCEDURE — 90694 VACC AIIV4 NO PRSRV 0.5ML IM: CPT | Mod: S$GLB,,, | Performed by: NURSE PRACTITIONER

## 2022-01-27 PROCEDURE — 1101F PR PT FALLS ASSESS DOC 0-1 FALLS W/OUT INJ PAST YR: ICD-10-PCS | Mod: CPTII,S$GLB,, | Performed by: NURSE PRACTITIONER

## 2022-01-27 PROCEDURE — 99999 PR PBB SHADOW E&M-EST. PATIENT-LVL IV: CPT | Mod: PBBFAC,,, | Performed by: NURSE PRACTITIONER

## 2022-01-27 PROCEDURE — 3288F PR FALLS RISK ASSESSMENT DOCUMENTED: ICD-10-PCS | Mod: CPTII,S$GLB,, | Performed by: NURSE PRACTITIONER

## 2022-01-27 RX ORDER — IPRATROPIUM BROMIDE 42 UG/1
42 SPRAY, METERED NASAL
COMMUNITY
Start: 2022-01-25 | End: 2023-03-17 | Stop reason: CLARIF

## 2022-01-27 NOTE — PATIENT INSTRUCTIONS
Counseling and Referral of Other Preventative  (Italic type indicates deductible and co-insurance are waived)    Patient Name: Richard Bustos  Today's Date: 1/27/2022    Health Maintenance       Date Due Completion Date    Shingles Vaccine (2 of 3) 09/21/2015 7/27/2015    Diabetes Urine Screening 09/08/2017 9/8/2016    Influenza Vaccine (1) 09/01/2021 12/2/2020    Lipid Panel 03/10/2022 3/10/2021    Hemoglobin A1c 07/06/2022 1/6/2022    Foot Exam 09/16/2022 9/16/2021    Override on 2/6/2020: Done (DR. TORREZ)    Override on 3/6/2019: Done    Override on 11/8/2018: Done (podiatry)    Override on 12/2/2015: Done (Dr Torrez)    Override on 10/5/2014: Done ([facility])    Eye Exam 09/28/2022 9/28/2021    High Dose Statin 01/27/2023 1/27/2022    Colorectal Cancer Screening 10/27/2024 10/27/2021    TETANUS VACCINE 03/06/2026 3/6/2016        No orders of the defined types were placed in this encounter.    The following information is provided to all patients.  This information is to help you find resources for any of the problems found today that may be affecting your health:                Living healthy guide: www.[facility].[place].gov      Understanding Diabetes: www.diabetes.org      Eating healthy: www.cdc.gov/healthyweight      CDC home safety checklist: www.cdc.gov/steadi/patient.html      Agency on Aging: www.goea.[place].[facility]      Alcoholics anonymous (AA): www.aa.org      Physical Activity: www.brandin.nih.gov/xe9nlif      Tobacco use: www.quitwithusla.org

## 2022-01-27 NOTE — PROGRESS NOTES
"  Richard Bustos presented for a  Medicare AWV and comprehensive Health Risk Assessment today. The following components were reviewed and updated:    · Medical history  · Family History  · Social history  · Allergies and Current Medications  · Health Risk Assessment  · Health Maintenance  · Care Team         ** See Completed Assessments for Annual Wellness Visit within the encounter summary.**         The following assessments were completed:  · Living Situation  · CAGE  · Depression Screening  · Timed Get Up and Go  · Whisper Test  · Cognitive Function Screening  · Nutrition Screening  · ADL Screening  · PAQ Screening            Vitals:    01/27/22 0949   BP: 128/72   Pulse: 61   Temp: 98.3 °F (36.8 °C)   SpO2: 98%   Weight: 119.6 kg (263 lb 10.7 oz)   Height: 5' 6" (1.676 m)     Body mass index is 42.56 kg/m².  Physical Exam  Constitutional:       Appearance: He is well-developed and well-nourished.   HENT:      Head: Normocephalic and atraumatic.      Right Ear: Hearing normal.      Left Ear: Hearing normal.      Nose: Nose normal.   Eyes:      General: Lids are normal.      Conjunctiva/sclera: Conjunctivae normal.      Pupils: Pupils are equal, round, and reactive to light.   Cardiovascular:      Rate and Rhythm: Normal rate.   Pulmonary:      Effort: Pulmonary effort is normal.   Abdominal:      Palpations: Abdomen is soft.   Musculoskeletal:         General: Normal range of motion.      Cervical back: Normal range of motion and neck supple.   Skin:     General: Skin is warm and dry.   Neurological:      Mental Status: He is alert and oriented to person, place, and time.               Diagnoses and health risks identified today and associated recommendations/orders:    1. Encounter for preventive health examination  Discussed health maintenance guidelines appropriate for age.    Shingrix education provided       2. Peripheral vascular disease  -Uncontrolled? Imaging as below for evaluation   - US Lower Extremity " Arteries Bilateral; Future    3. Dementia without behavioral disturbance, unspecified dementia type  Stable, continue to monitor  Followed by pcp    4. Diabetic polyneuropathy associated with type 2 diabetes mellitus  Stable, continue to monitor  Followed by pcp    5. Uncomplicated opioid dependence  Stable, continue to monitor  Followed by pain management    6. Abdominal aortic atherosclerosis  Stable, continue to monitor  On statin  Followed by pcp    7. Bilateral carotid artery stenosis  Stable, last u/s didn't show significant stenosis     8. Chronic systolic congestive heart failure  Stable, continue current medication regimen  Followed by cardiology     9. Hyperlipidemia associated with type 2 diabetes mellitus  Controlled, continue current medication regimen  Patient taking statin  Followed by pcp      10. Hypertension associated with diabetes  Controlled, continue current medication regimen  Low salt diet  Increase physical activity  Followed by pcp      11. Paroxysmal atrial fibrillation  Stable, continue current medication regimen  Followed by cardiology     12. Stage 3b chronic kidney disease  Stable, continue to monitor  Followed by nephrology     13. Anemia due to stage 3 chronic kidney disease, unspecified whether stage 3a or 3b CKD  Stable, continue to monitor  Followed by hematology     14. Abnormality of gait and mobility  Stable, continue use of assistive devices as needed   Followed by pcp    15. Other reduced mobility  Stable, continue use of assistive devices as needed   Followed by pcp      PCP follow up 3/2022    Provided Richard with a 5-10 year written screening schedule and personal prevention plan. Recommendations were developed using the USPSTF age appropriate recommendations. Education, counseling, and referrals were provided as needed. After Visit Summary printed and given to patient which includes a list of additional screenings\tests needed.    Follow up for One year for Annual Wellness  Visit.    Bhumi Conrad, NP  I offered to discuss advanced care planning, including how to pick a person who would make decisions for you if you were unable to make them for yourself, called a health care power of , and what kind of decisions you might make such as use of life sustaining treatments such as ventilators and tube feeding when faced with a life limiting illness recorded on a living will that they will need to know. (How you want to be cared for as you near the end of your natural life)     X  Patient has advanced directives on file, which we reviewed, and they do not wish to make changes.

## 2022-01-31 ENCOUNTER — TELEPHONE (OUTPATIENT)
Dept: PAIN MEDICINE | Facility: CLINIC | Age: 77
End: 2022-01-31

## 2022-01-31 ENCOUNTER — OFFICE VISIT (OUTPATIENT)
Dept: PAIN MEDICINE | Facility: CLINIC | Age: 77
End: 2022-01-31
Payer: MEDICARE

## 2022-01-31 VITALS
DIASTOLIC BLOOD PRESSURE: 62 MMHG | HEART RATE: 59 BPM | BODY MASS INDEX: 42.27 KG/M2 | WEIGHT: 263 LBS | HEIGHT: 66 IN | SYSTOLIC BLOOD PRESSURE: 111 MMHG

## 2022-01-31 DIAGNOSIS — M47.896 OTHER SPONDYLOSIS, LUMBAR REGION: Primary | ICD-10-CM

## 2022-01-31 DIAGNOSIS — M17.12 PRIMARY OSTEOARTHRITIS OF LEFT KNEE: ICD-10-CM

## 2022-01-31 DIAGNOSIS — M48.00 CENTRAL STENOSIS OF SPINAL CANAL: ICD-10-CM

## 2022-01-31 DIAGNOSIS — G89.4 CHRONIC PAIN DISORDER: ICD-10-CM

## 2022-01-31 DIAGNOSIS — M51.36 DDD (DEGENERATIVE DISC DISEASE), LUMBAR: ICD-10-CM

## 2022-01-31 DIAGNOSIS — M51.36 DEGENERATIVE DISC DISEASE, LUMBAR: ICD-10-CM

## 2022-01-31 PROCEDURE — 1125F AMNT PAIN NOTED PAIN PRSNT: CPT | Mod: CPTII,S$GLB,, | Performed by: PHYSICIAN ASSISTANT

## 2022-01-31 PROCEDURE — 1159F MED LIST DOCD IN RCRD: CPT | Mod: CPTII,S$GLB,, | Performed by: PHYSICIAN ASSISTANT

## 2022-01-31 PROCEDURE — 1159F PR MEDICATION LIST DOCUMENTED IN MEDICAL RECORD: ICD-10-PCS | Mod: CPTII,S$GLB,, | Performed by: PHYSICIAN ASSISTANT

## 2022-01-31 PROCEDURE — 3044F PR MOST RECENT HEMOGLOBIN A1C LEVEL <7.0%: ICD-10-PCS | Mod: CPTII,S$GLB,, | Performed by: PHYSICIAN ASSISTANT

## 2022-01-31 PROCEDURE — 3044F HG A1C LEVEL LT 7.0%: CPT | Mod: CPTII,S$GLB,, | Performed by: PHYSICIAN ASSISTANT

## 2022-01-31 PROCEDURE — 3074F SYST BP LT 130 MM HG: CPT | Mod: CPTII,S$GLB,, | Performed by: PHYSICIAN ASSISTANT

## 2022-01-31 PROCEDURE — 99214 PR OFFICE/OUTPT VISIT, EST, LEVL IV, 30-39 MIN: ICD-10-PCS | Mod: S$GLB,,, | Performed by: PHYSICIAN ASSISTANT

## 2022-01-31 PROCEDURE — 1101F PR PT FALLS ASSESS DOC 0-1 FALLS W/OUT INJ PAST YR: ICD-10-PCS | Mod: CPTII,S$GLB,, | Performed by: PHYSICIAN ASSISTANT

## 2022-01-31 PROCEDURE — 3008F BODY MASS INDEX DOCD: CPT | Mod: CPTII,S$GLB,, | Performed by: PHYSICIAN ASSISTANT

## 2022-01-31 PROCEDURE — 1111F DSCHRG MED/CURRENT MED MERGE: CPT | Mod: CPTII,S$GLB,, | Performed by: PHYSICIAN ASSISTANT

## 2022-01-31 PROCEDURE — 3288F PR FALLS RISK ASSESSMENT DOCUMENTED: ICD-10-PCS | Mod: CPTII,S$GLB,, | Performed by: PHYSICIAN ASSISTANT

## 2022-01-31 PROCEDURE — 1125F PR PAIN SEVERITY QUANTIFIED, PAIN PRESENT: ICD-10-PCS | Mod: CPTII,S$GLB,, | Performed by: PHYSICIAN ASSISTANT

## 2022-01-31 PROCEDURE — 3078F DIAST BP <80 MM HG: CPT | Mod: CPTII,S$GLB,, | Performed by: PHYSICIAN ASSISTANT

## 2022-01-31 PROCEDURE — 1101F PT FALLS ASSESS-DOCD LE1/YR: CPT | Mod: CPTII,S$GLB,, | Performed by: PHYSICIAN ASSISTANT

## 2022-01-31 PROCEDURE — 99214 OFFICE O/P EST MOD 30 MIN: CPT | Mod: S$GLB,,, | Performed by: PHYSICIAN ASSISTANT

## 2022-01-31 PROCEDURE — 3008F PR BODY MASS INDEX (BMI) DOCUMENTED: ICD-10-PCS | Mod: CPTII,S$GLB,, | Performed by: PHYSICIAN ASSISTANT

## 2022-01-31 PROCEDURE — 99999 PR PBB SHADOW E&M-EST. PATIENT-LVL III: ICD-10-PCS | Mod: PBBFAC,,, | Performed by: PHYSICIAN ASSISTANT

## 2022-01-31 PROCEDURE — 3078F PR MOST RECENT DIASTOLIC BLOOD PRESSURE < 80 MM HG: ICD-10-PCS | Mod: CPTII,S$GLB,, | Performed by: PHYSICIAN ASSISTANT

## 2022-01-31 PROCEDURE — 99999 PR PBB SHADOW E&M-EST. PATIENT-LVL III: CPT | Mod: PBBFAC,,, | Performed by: PHYSICIAN ASSISTANT

## 2022-01-31 PROCEDURE — 3288F FALL RISK ASSESSMENT DOCD: CPT | Mod: CPTII,S$GLB,, | Performed by: PHYSICIAN ASSISTANT

## 2022-01-31 PROCEDURE — 1111F PR DISCHARGE MEDS RECONCILED W/ CURRENT OUTPATIENT MED LIST: ICD-10-PCS | Mod: CPTII,S$GLB,, | Performed by: PHYSICIAN ASSISTANT

## 2022-01-31 PROCEDURE — 3074F PR MOST RECENT SYSTOLIC BLOOD PRESSURE < 130 MM HG: ICD-10-PCS | Mod: CPTII,S$GLB,, | Performed by: PHYSICIAN ASSISTANT

## 2022-01-31 RX ORDER — HYDROCODONE BITARTRATE AND ACETAMINOPHEN 5; 325 MG/1; MG/1
1 TABLET ORAL EVERY 8 HOURS PRN
Qty: 90 TABLET | Refills: 0 | Status: SHIPPED | OUTPATIENT
Start: 2022-03-17 | End: 2022-04-13 | Stop reason: SDUPTHER

## 2022-01-31 RX ORDER — HYDROCODONE BITARTRATE AND ACETAMINOPHEN 5; 325 MG/1; MG/1
1 TABLET ORAL EVERY 8 HOURS PRN
Qty: 90 TABLET | Refills: 0 | Status: SHIPPED | OUTPATIENT
Start: 2022-02-16 | End: 2022-03-17

## 2022-01-31 NOTE — PROGRESS NOTES
FOLLOW UP NOTE:     CHIEF COMPLAINT: left knee pain, lower back pain      Interval history:  Richard Bustos is a 76 y.o. male with chronic left knee pain who presents today for f/u.He is s/p bilateral L3,4 , 5 MB RFA 2 months ago with 50% relief.  Left knee injection that provided moderate benefit for about 1 month.   Continued low back pain is worse with standing up after prolonged sitting or laying.   He takes Norco 5 mg very q.8 hours as needed with moderate benefit.  Denies side effects with medication.  Denies any worsening weakness.  No bowel bladder changes.  Pain today is rated 2/10.    Prior HPI: Richard Bustos is a 76 y.o. male with PMH significant for CAD s/p PCI (ASA and Plavix), atrial fibrllation on coumadin, hx of bilateral hip replacements, CKD, HTN, and DORA presents as an established patient for the continued management of back and right hip pain. Today, the patient is not complaining primarily of back pain. The patient is reporting of right hip pain with associated groin pain today. The patient reports that this began approximately 1 month ago. The patient reports that he has a prior history of a hematoma that he is concerned about given his relatively new onset right hip pain. He was seen by orthopedics, Dr. Ríos and underwent a CT guided drainage.  Hip pain is much improved.  The patient denies of any significant changes in his health since his last appointment. The patient also denies of any changes in the character of his pain since his last appointment. The patient reports of benefit on his current pain regimen. Patient denies of any urinary/fecal incontinence, saddle anesthesia, or weakness.     INTERVENTIONAL PAIN HISTORY:  11/23/21: B/L L3, 4, 5 MB RFA 50% relief  10/19/2020, 5/2021: Left knee intra-articular steroid injection   9/21/2020: Lumbar interlaminar epidural steroid injection at L5-S1  6/22/2020: L5-S1 lumbar interlaminar epidural steroid injection - at least 50%  "relief  2/17/2020: Left knee intra-articular knee injection - good relief  1/10/2020: Radiofrequency ablation of the L3, L4, L5 medial branch nerves on the bilateral-side - 50% relief  8/3/2017: RFA at L3, 4, 5 - reports 80% relief    CURRENT PAIN MEDICATIONS:   Norco 5-325 mg PO BID/TID     Tried in the past:   Tramadol 50 mg PO BID for pain - mild benefit     ROS:  Review of Systems   Constitutional: Negative for chills and fever.   HENT: Negative for tinnitus.    Eyes: Negative for visual disturbance.   Respiratory: Negative for shortness of breath.    Cardiovascular: Negative for chest pain.   Gastrointestinal: Negative for nausea and vomiting.   Genitourinary: Negative for difficulty urinating.   Musculoskeletal: Positive for arthralgias and back pain.   Skin: Negative for rash.   Allergic/Immunologic: Negative for immunocompromised state.   Neurological: Negative for syncope.   Hematological: Does not bruise/bleed easily.   Psychiatric/Behavioral: Negative for suicidal ideas.        MEDICAL, SURGICAL, FAMILY, SOCIAL HX: reviewed    MEDICATIONS/ALLERGIES: reviewed    PHYSICAL EXAM:    VITALS: Vitals reviewed.   Vitals:    01/31/22 0846   BP: 111/62   Pulse: (!) 59   Weight: 119.3 kg (263 lb)   Height: 5' 6" (1.676 m)   PainSc:   4   PainLoc: Back       Physical Exam  Vitals and nursing note reviewed.   Constitutional:       Appearance: He is not diaphoretic.   HENT:      Head: Normocephalic and atraumatic.   Eyes:      General:         Right eye: No discharge.         Left eye: No discharge.      Conjunctiva/sclera: Conjunctivae normal.   Cardiovascular:      Rate and Rhythm: Bradycardia present.   Pulmonary:      Effort: Pulmonary effort is normal. No respiratory distress.      Breath sounds: Normal breath sounds.   Abdominal:      Palpations: Abdomen is soft.   Musculoskeletal:      Lumbar back: Tenderness present. Negative right straight leg raise test and negative left straight leg raise test.   Skin:     " General: Skin is warm and dry.      Findings: No rash.   Neurological:      Mental Status: He is alert and oriented to person, place, and time.   Psychiatric:         Mood and Affect: Mood and affect normal.         Cognition and Memory: Memory normal.         Judgment: Judgment normal.          UPPER EXTREMITIES: Normal alignment, normal range of motion, no atrophy, no skin changes,  hair growth and nail growth normal and equal bilaterally. No swelling, no tenderness.    LOWER EXTREMITIES:  Normal alignment, normal range of motion, no atrophy, no skin changes,  hair growth and nail growth normal and equal bilaterally. No swelling, no tenderness.     LUMBAR SPINE  Lumbar spine: ROM is significantly limited with flexion extension and oblique extension with increased pain.    Supine straight leg raise: unable to perform secondary to fall risk   ((+)) Facet loading bilaterally  Internal and external rotation of the hips: unable to perform secondary to fall risk  Myofascial exam: No tenderness to palpation across lumbar paraspinous muscles.     MOTOR: Tone and bulk: normal bilateral upper and lower Strength: normal   Delt      Bi         Tri        WE      WF                        R          5          5          5          5          5          5            L          5          5          5          5          5          5               IP         ADD     ABD     Quad   TA        Gas      HAM  R          5          5          5          5          5          5          5  L          5          5          5          5          5          5          5     SENSATION: Light touch and pinprick intact bilaterally  REFLEXES: normal, symmetric, nonbrisk.  Toes down, no clonus. Negative roca's sign bilaterally.  GAIT: normal rise, base, steps, and arm swing.      IMAGING: no new imaging to review    ASSESSMENT: Richard Bustos is a 75 y.o. male with PMH significant for CAD s/p PCI (ASA and Plavix), atrial fibrllation on  coumadin, hx of bilateral hip replacements, CKD, HTN, and DORA presents as an established patient for the continued management of back and right hip pain. No recent imaging of the right hip to review today. Treatment plan outlined below.   1. Other spondylosis, lumbar region    2. DDD (degenerative disc disease), lumbar    3. Primary osteoarthritis of left knee    4. Central stenosis of spinal canal    5. Degenerative disc disease, lumbar        PLAN:  1. I have stressed the importance of physical activity and exercise to improve overall health  2. Reviewed pertinent imaging and records with patient  3. Consider repeat left knee injection.   4. Patient with significant benefit following Lumbar STARLA.  Patient will continue to monitor his progress.  May consider repeat procedure in future if pain returns or worsens.   5. Monitor progress from lumbar MB RFA at L3, 4, 5 and repeat in >4 months if inedicated  6. He can continue norco 5mg q8hrs as needed.   reviewed.  Previous UDS consistent  7. Follow up in 3 months  Medication management per Dr. Causey

## 2022-02-08 ENCOUNTER — PATIENT MESSAGE (OUTPATIENT)
Dept: FAMILY MEDICINE | Facility: CLINIC | Age: 77
End: 2022-02-08
Payer: MEDICARE

## 2022-02-08 DIAGNOSIS — L98.9 SKIN LESION OF FACE: Primary | ICD-10-CM

## 2022-02-10 ENCOUNTER — OFFICE VISIT (OUTPATIENT)
Dept: PODIATRY | Facility: CLINIC | Age: 77
End: 2022-02-10
Payer: MEDICARE

## 2022-02-10 VITALS
BODY MASS INDEX: 42.27 KG/M2 | HEART RATE: 60 BPM | DIASTOLIC BLOOD PRESSURE: 65 MMHG | SYSTOLIC BLOOD PRESSURE: 142 MMHG | WEIGHT: 263 LBS | HEIGHT: 66 IN

## 2022-02-10 DIAGNOSIS — E11.49 TYPE II DIABETES MELLITUS WITH NEUROLOGICAL MANIFESTATIONS: Primary | ICD-10-CM

## 2022-02-10 DIAGNOSIS — M21.622 TAILOR'S BUNION OF LEFT FOOT: ICD-10-CM

## 2022-02-10 DIAGNOSIS — L84 CORN OR CALLUS: ICD-10-CM

## 2022-02-10 DIAGNOSIS — Z79.01 LONG TERM (CURRENT) USE OF ANTICOAGULANTS: ICD-10-CM

## 2022-02-10 DIAGNOSIS — B35.1 ONYCHOMYCOSIS DUE TO DERMATOPHYTE: ICD-10-CM

## 2022-02-10 DIAGNOSIS — Z87.898 HISTORY OF ULCERATION: ICD-10-CM

## 2022-02-10 PROCEDURE — 1125F PR PAIN SEVERITY QUANTIFIED, PAIN PRESENT: ICD-10-PCS | Mod: CPTII,S$GLB,, | Performed by: PODIATRIST

## 2022-02-10 PROCEDURE — 99999 PR PBB SHADOW E&M-EST. PATIENT-LVL II: CPT | Mod: PBBFAC,,, | Performed by: PODIATRIST

## 2022-02-10 PROCEDURE — 1125F AMNT PAIN NOTED PAIN PRSNT: CPT | Mod: CPTII,S$GLB,, | Performed by: PODIATRIST

## 2022-02-10 PROCEDURE — 3044F HG A1C LEVEL LT 7.0%: CPT | Mod: CPTII,S$GLB,, | Performed by: PODIATRIST

## 2022-02-10 PROCEDURE — 1101F PT FALLS ASSESS-DOCD LE1/YR: CPT | Mod: CPTII,S$GLB,, | Performed by: PODIATRIST

## 2022-02-10 PROCEDURE — 3044F PR MOST RECENT HEMOGLOBIN A1C LEVEL <7.0%: ICD-10-PCS | Mod: CPTII,S$GLB,, | Performed by: PODIATRIST

## 2022-02-10 PROCEDURE — 99999 PR PBB SHADOW E&M-EST. PATIENT-LVL II: ICD-10-PCS | Mod: PBBFAC,,, | Performed by: PODIATRIST

## 2022-02-10 PROCEDURE — 1159F PR MEDICATION LIST DOCUMENTED IN MEDICAL RECORD: ICD-10-PCS | Mod: CPTII,S$GLB,, | Performed by: PODIATRIST

## 2022-02-10 PROCEDURE — 1101F PR PT FALLS ASSESS DOC 0-1 FALLS W/OUT INJ PAST YR: ICD-10-PCS | Mod: CPTII,S$GLB,, | Performed by: PODIATRIST

## 2022-02-10 PROCEDURE — 99214 PR OFFICE/OUTPT VISIT, EST, LEVL IV, 30-39 MIN: ICD-10-PCS | Mod: 25,S$GLB,, | Performed by: PODIATRIST

## 2022-02-10 PROCEDURE — 3077F PR MOST RECENT SYSTOLIC BLOOD PRESSURE >= 140 MM HG: ICD-10-PCS | Mod: CPTII,S$GLB,, | Performed by: PODIATRIST

## 2022-02-10 PROCEDURE — 3078F PR MOST RECENT DIASTOLIC BLOOD PRESSURE < 80 MM HG: ICD-10-PCS | Mod: CPTII,S$GLB,, | Performed by: PODIATRIST

## 2022-02-10 PROCEDURE — 3008F BODY MASS INDEX DOCD: CPT | Mod: CPTII,S$GLB,, | Performed by: PODIATRIST

## 2022-02-10 PROCEDURE — 1159F MED LIST DOCD IN RCRD: CPT | Mod: CPTII,S$GLB,, | Performed by: PODIATRIST

## 2022-02-10 PROCEDURE — 3288F PR FALLS RISK ASSESSMENT DOCUMENTED: ICD-10-PCS | Mod: CPTII,S$GLB,, | Performed by: PODIATRIST

## 2022-02-10 PROCEDURE — 11721 PR DEBRIDEMENT OF NAILS, 6 OR MORE: ICD-10-PCS | Mod: 59,Q9,S$GLB, | Performed by: PODIATRIST

## 2022-02-10 PROCEDURE — 11055 PR TRIM HYPERKERATOTIC SKIN LESION, ONE: ICD-10-PCS | Mod: Q9,S$GLB,, | Performed by: PODIATRIST

## 2022-02-10 PROCEDURE — 3008F PR BODY MASS INDEX (BMI) DOCUMENTED: ICD-10-PCS | Mod: CPTII,S$GLB,, | Performed by: PODIATRIST

## 2022-02-10 PROCEDURE — 3077F SYST BP >= 140 MM HG: CPT | Mod: CPTII,S$GLB,, | Performed by: PODIATRIST

## 2022-02-10 PROCEDURE — 99214 OFFICE O/P EST MOD 30 MIN: CPT | Mod: 25,S$GLB,, | Performed by: PODIATRIST

## 2022-02-10 PROCEDURE — 3078F DIAST BP <80 MM HG: CPT | Mod: CPTII,S$GLB,, | Performed by: PODIATRIST

## 2022-02-10 PROCEDURE — 11055 PARING/CUTG B9 HYPRKER LES 1: CPT | Mod: Q9,S$GLB,, | Performed by: PODIATRIST

## 2022-02-10 PROCEDURE — 3288F FALL RISK ASSESSMENT DOCD: CPT | Mod: CPTII,S$GLB,, | Performed by: PODIATRIST

## 2022-02-10 PROCEDURE — 11721 DEBRIDE NAIL 6 OR MORE: CPT | Mod: 59,Q9,S$GLB, | Performed by: PODIATRIST

## 2022-02-10 RX ORDER — AMMONIUM LACTATE 12 G/100G
CREAM TOPICAL
Qty: 140 G | Refills: 11 | Status: SHIPPED | OUTPATIENT
Start: 2022-02-10 | End: 2023-03-17 | Stop reason: CLARIF

## 2022-02-10 RX ORDER — CICLOPIROX 80 MG/ML
SOLUTION TOPICAL NIGHTLY
Qty: 6.6 ML | Refills: 11 | Status: SHIPPED | OUTPATIENT
Start: 2022-02-10 | End: 2022-03-31

## 2022-02-10 NOTE — PROGRESS NOTES
Subjective:      Patient ID: Richard Bustos is a 76 y.o. male.    Chief Complaint: Diabetic Foot Exam ( PCP Bhumi Conrad NP 1/27/2022)    Richard is a 76 y.o. male who presents to the clinic for evaluation and treatment of high risk feet. Richard has a past medical history of Anemia, Anemia of other chronic disease (9/13/2017), Anemia, chronic renal failure (9/13/2017), Anemia, unspecified (9/13/2017), Anticoagulant long-term use, Aorta aneurysm, Arthritis, Atrial fibrillation, Bacteremia due to Streptococcus (1/8/2022), CAD (coronary artery disease), CHF (congestive heart failure), Chronic kidney disease, Clotting disorder (9/13/2017), Colon polyp, Dementia, Diabetes mellitus, Diabetes mellitus type II, Diverticulosis, Elevated PSA, Encounter for blood transfusion, Former smoker, General anesthetics causing adverse effect in therapeutic use, GERD (gastroesophageal reflux disease), Gout, colonic polyps, Hypercoagulable state, Hyperlipidemia, Hypertension, MI, old (2010), MTHFR mutation, Myocardial infarction, Osteomyelitis of ankle or foot (3/9/2017), Prostate cancer (06/2016), Sleep apnea, Squamous cell carcinoma (01/23/2018), and Venous stasis. The patient's chief complaint is long, thick toenails. Gradual onset, worsening over past several weeks, aggravated by increased weight bearing, shoe gear, pressure.  Periodic debridement help symptoms .    PCP: Familia Ramirez Jr, MD    Date Last Seen by PCP:   Chief Complaint   Patient presents with    Diabetic Foot Exam      PCP Bhumi Conrad NP 1/27/2022         Current shoe gear:  Affected Foot: Rx diabetic extra depth shoes and custom accommodative insoles     Unaffected Foot: Rx diabetic extra depth shoes and custom accommodative insoles    Hemoglobin A1C   Date Value Ref Range Status   01/06/2022 5.1 4.0 - 5.6 % Final     Comment:     ADA Screening Guidelines:  5.7-6.4%  Consistent with prediabetes  >or=6.5%  Consistent with diabetes    High levels of fetal  hemoglobin interfere with the HbA1C  assay. Heterozygous hemoglobin variants (HbS, HgC, etc)do  not significantly interfere with this assay.   However, presence of multiple variants may affect accuracy.     08/18/2021 5.3 4.0 - 5.6 % Final     Comment:     ADA Screening Guidelines:  5.7-6.4%  Consistent with prediabetes  >or=6.5%  Consistent with diabetes    High levels of fetal hemoglobin interfere with the HbA1C  assay. Heterozygous hemoglobin variants (HbS, HgC, etc)do  not significantly interfere with this assay.   However, presence of multiple variants may affect accuracy.     11/17/2020 6.1 (H) 4.0 - 5.6 % Final     Comment:     ADA Screening Guidelines:  5.7-6.4%  Consistent with prediabetes  >or=6.5%  Consistent with diabetes  High levels of fetal hemoglobin interfere with the HbA1C  assay. Heterozygous hemoglobin variants (HbS, HgC, etc)do  not significantly interfere with this assay.   However, presence of multiple variants may affect accuracy.     06/17/2013 6.8 (H) 4.8 - 5.6 % Final     Comment:              Increased risk for diabetes: 5.7 - 6.4           Diabetes: >6.4           Glycemic control for adults with diabetes: <7.0  **In order to standardize %HbA1c results worldwide, as of October 11, 2010,  the %HbA1c is being calculated using the master equation recommended in the  consensus statement adopted by the ADA (American Diabetes Assoc), EASD  (European Assoc for the Study of Diabetes), IFCC (International Federation  of Clinical Chemistry and Laboratory Medicine) and IDF (International  Diabetes Federation). Result units: %HgbA1c (DCCT/NGSP).  In common with other methods, Hb A1C values may not accurately reflect mean  blood glucose in patients with hemoglobin variants (HgbF, HgbS and HgbC).  Any cause of shortened erythrocyte survival will reduce exposure of  erythrocytes to glucose with a consequent decrease in HbA1c (%) values, even  though the time-averaged blood glucose level may be  elevated. Causes of  shortened erythrocyte lifetime might be hemolytic anemia or other hemolytic  diseases, homozygous sickle cell trait, pregnancy, recent significant or  chronic blood loss, etc. Caution should be used when interpreting the HbA1c  results from patients with these conditions.       Review of Systems   Constitutional: Negative for chills, decreased appetite, diaphoresis, fever, malaise/fatigue and night sweats.   Cardiovascular: Positive for leg swelling. Negative for claudication, cyanosis and syncope.   Skin: Positive for nail changes, poor wound healing and suspicious lesions.   Neurological: Positive for numbness, paresthesias and sensory change.           Objective:      Physical Exam  Constitutional:       General: He is not in acute distress.     Appearance: He is well-developed. He is not diaphoretic.   Cardiovascular:      Pulses:           Dorsalis pedis pulses are 1+ on the right side and 1+ on the left side.        Posterior tibial pulses are 1+ on the right side and 1+ on the left side.      Comments: Capillary refill 3 seconds all toes/distal feet, all toes/both feet warm to touch.      Negative lymphadenopathy bilateral popliteal fossa and tarsal tunnel.     Trace lower extremity edema bilateral.    Musculoskeletal:      Right ankle: No swelling, deformity, ecchymosis or lacerations. Normal range of motion. Normal pulse.      Right Achilles Tendon: Normal. No defects. Chung's test negative.      Comments: Rigid hammertoes 2 through 5 right and left feet with left 2nd toe superiorly displaced all not reducible today but without loss of function or signs of acute trauma both feet.    Tailor bunion present 5th mtpj left with medial deviation of 5th toe, prominent bony bump lateral 5th mtpj, and pain to palpation without evidence of trauma or infection.         Lymphadenopathy:      Lower Body: No right inguinal adenopathy. No left inguinal adenopathy.      Comments: Negative  lymphadenopathy bilateral popliteal fossa and tarsal tunnel.    Negative lymphangitic streaking bilateral feet/ankles/legs.   Skin:     General: Skin is warm and dry.      Capillary Refill: Capillary refill takes 2 to 3 seconds.      Coloration: Skin is not pale.      Findings: No abrasion, bruising, burn, ecchymosis, erythema, laceration, lesion or rash.      Nails: There is no clubbing.      Comments: Focal hyperkeratotic lesion consisting entirely of hyperkeratotic tissue without open skin, drainage, pus, fluctuance, malodor, or signs of infection left 5th mtpj plantar lateral    Otherwise, Skin thin, shiny, atrophic, with decreased density and distribution of pedal hair bilateral, but without hyperpigmentation, minerva discoloration,  ulcers, masses, nodules or cords palpated bilateral feet and legs.    Toenails 1st, 2nd, 3rd, 4th, 5th  bilateral are hypertrophic thickened 2-3 mm, dystrophic, discolored tanish brown with tan, gray crumbly subungual debris.  Tender to distal nail plate pressure, without periungual skin abnormality of each.       Neurological:      Mental Status: He is alert and oriented to person, place, and time.      Sensory: Sensory deficit present.      Motor: No tremor, atrophy or abnormal muscle tone.      Gait: Gait normal.      Comments: Paresthesias, and burning bilateral feet with no clearly identified trigger or source.    Diminished/loss of protective sensation all toes bilateral to 10 gram monofilament.    Decreased/absent vibratory sensation bilateral feet to 128Hz tuning fork.     Psychiatric:         Behavior: Behavior is cooperative.               Assessment:       Encounter Diagnoses   Name Primary?    Type II diabetes mellitus with neurological manifestations Yes    History of ulceration     Onychomycosis due to dermatophyte     Long term (current) use of anticoagulants     Corn or callus     Tailor's bunion of left foot          Plan:       Richard was seen today for diabetic  foot exam.    Diagnoses and all orders for this visit:    Type II diabetes mellitus with neurological manifestations  -     DIABETIC SHOES FOR HOME USE  -      DIABETES FOOT EXAM    History of ulceration  -     DIABETIC SHOES FOR HOME USE  -      DIABETES FOOT EXAM    Onychomycosis due to dermatophyte  -     DIABETIC SHOES FOR HOME USE  -      DIABETES FOOT EXAM    Long term (current) use of anticoagulants  -     DIABETIC SHOES FOR HOME USE  -      DIABETES FOOT EXAM    Corn or callus  -     DIABETIC SHOES FOR HOME USE  -      DIABETES FOOT EXAM    Tailor's bunion of left foot  -     DIABETIC SHOES FOR HOME USE  -      DIABETES FOOT EXAM    Other orders  -     ciclopirox (PENLAC) 8 % Soln; Apply topically nightly.  -     ammonium lactate 12 % Crea; Apply twice daily to affected parts both feet as needed.      I counseled the patient on his conditions, their implications and medical management.        - Shoe inspection. Diabetic Foot Education. Patient reminded of the importance of good nutrition and blood sugar control to help prevent podiatric complications of diabetes. Patient instructed on proper foot hygeine. We discussed wearing proper shoe gear, daily foot inspections, never walking without protective shoe gear, never putting sharp instruments to feet, routine podiatric visits at least annually.    Discussed conservative treatment with shoes of adequate dimensions, material, and style to alleviate symptoms and delay or prevent surgical intervention.    Spot stretch left shoe Rx written.    Continue penlac      - With patient's permission, nails were aggressively reduced and debrided x 10 to their soft tissue attachment mechanically , removing all offending nail and debris. Patient relates relief following the procedure. He will continue to monitor the areas daily, inspect his feet, wear protective shoe gear when ambulatory, moisturizer to maintain skin integrity and follow in this office   p.r.n.      With the patient's permission, I debrided hyperkeratotic lesion(s) as above totaling        1        to, not  Including dermis with sterile #15 blade.  Patient tolerated the procedure well and related significant relief.        No follow-ups on file.

## 2022-03-07 DIAGNOSIS — I11.9 HYPERTENSIVE LEFT VENTRICULAR HYPERTROPHY, WITHOUT HEART FAILURE: Primary | ICD-10-CM

## 2022-03-07 RX ORDER — CARVEDILOL 25 MG/1
25 TABLET ORAL 2 TIMES DAILY WITH MEALS
Qty: 180 TABLET | Refills: 3 | Status: SHIPPED | OUTPATIENT
Start: 2022-03-07 | End: 2023-01-25 | Stop reason: SDUPTHER

## 2022-03-07 NOTE — TELEPHONE ENCOUNTER
----- Message from Srinath Lorenz sent at 3/7/2022  9:58 AM CST -----  Contact: Pharmacy  Type: RX Refill Request  Who Called:[facility]   Refill or New RX:Refill  RX Name and Strength: carvediloL (COREG) 25 MG tablet  How is the patient currently taking it: As Directed  Is this a 30 or 90 day : as Directed  Preferred Pharmacy/Phone Number:  [facility] [place] - [address]  [address] [place] [address]  Phone: [phone] Fax: [phone]      Best Call Back Number:[phone]    Additional Information :N/A

## 2022-03-07 NOTE — TELEPHONE ENCOUNTER
Care Due:                  Date            Visit Type   Department     Provider  --------------------------------------------------------------------------------                                EP -                              PRIMARY      [facility]  Last Visit: 09-      CARE ([facility])   PRACTICE       Familia Ramirez                              EP -                              PRIMARY      [facility]  Next Visit: 03-      CARE ([facility])   PRACTICE       Familia Ramirez                                                            Last  Test          Frequency    Reason                     Performed    Due Date  --------------------------------------------------------------------------------    Lipid Panel.  12 months..  atorvastatin.............  03-   03-    Uric Acid...  12 months..  allopurinoL..............  05- 04-    Powered by [employer]. Reference number: [id number].   3/07/2022 10:20:19 AM CST

## 2022-03-11 NOTE — TELEPHONE ENCOUNTER
----- Message from Dulce Reis sent at 3/11/2022  1:05 PM CST -----  Type:  RX Refill Request    Who Called:  pharmacy   Refill or New Rx:  refill   RX Name and Strength:  mirabegron (MYRBETRIQ) 50 mg Tb24  How is the patient currently taking it? (ex. 1XDay):  as directed   Is this a 30 day or 90 day RX:  local   Preferred Pharmacy with phone number:   [facility] [place] - [address] [place] [address]  Phone: [phone] Fax: [phone]  Local or Mail Order:  local   Ordering Provider:  axel   Additional Information:  please advise-thank you

## 2022-03-12 DIAGNOSIS — G89.4 CHRONIC PAIN DISORDER: ICD-10-CM

## 2022-03-12 RX ORDER — HYDROCODONE BITARTRATE AND ACETAMINOPHEN 5; 325 MG/1; MG/1
1 TABLET ORAL EVERY 8 HOURS PRN
Qty: 90 TABLET | Refills: 0 | Status: CANCELLED | OUTPATIENT
Start: 2022-03-12 | End: 2022-04-10

## 2022-03-14 RX ORDER — MIRABEGRON 50 MG/1
50 TABLET, FILM COATED, EXTENDED RELEASE ORAL DAILY
Qty: 90 TABLET | Refills: 0 | Status: SHIPPED | OUTPATIENT
Start: 2022-03-14 | End: 2022-03-14

## 2022-03-14 NOTE — TELEPHONE ENCOUNTER
This patient has not been seen by urology in over a year.  A prescription was refilled for myrbetriq 90 days with no refills but the patient will need a one year f/u. However please see their last note and see if they need a year f/u with me with labs and/or imaging or if the patient was discharged to pcp and refills can be done by pcp if patient is doing well on medication.

## 2022-03-25 ENCOUNTER — OFFICE VISIT (OUTPATIENT)
Dept: FAMILY MEDICINE | Facility: CLINIC | Age: 77
End: 2022-03-25
Payer: MEDICARE

## 2022-03-25 VITALS
SYSTOLIC BLOOD PRESSURE: 120 MMHG | OXYGEN SATURATION: 97 % | HEIGHT: 66 IN | HEART RATE: 64 BPM | TEMPERATURE: 99 F | BODY MASS INDEX: 43.19 KG/M2 | DIASTOLIC BLOOD PRESSURE: 60 MMHG | WEIGHT: 268.75 LBS

## 2022-03-25 DIAGNOSIS — G89.4 CHRONIC PAIN DISORDER: ICD-10-CM

## 2022-03-25 DIAGNOSIS — Z79.01 LONG TERM (CURRENT) USE OF ANTICOAGULANTS: ICD-10-CM

## 2022-03-25 DIAGNOSIS — Z15.89 MTHFR MUTATION (METHYLENETETRAHYDROFOLATE REDUCTASE): ICD-10-CM

## 2022-03-25 DIAGNOSIS — E11.69 HYPERLIPIDEMIA ASSOCIATED WITH TYPE 2 DIABETES MELLITUS: ICD-10-CM

## 2022-03-25 DIAGNOSIS — I73.9 PERIPHERAL VASCULAR DISEASE: ICD-10-CM

## 2022-03-25 DIAGNOSIS — K21.9 GASTROESOPHAGEAL REFLUX DISEASE WITHOUT ESOPHAGITIS: ICD-10-CM

## 2022-03-25 DIAGNOSIS — E11.22 TYPE 2 DIABETES MELLITUS WITH STAGE 3B CHRONIC KIDNEY DISEASE, WITHOUT LONG-TERM CURRENT USE OF INSULIN: Primary | Chronic | ICD-10-CM

## 2022-03-25 DIAGNOSIS — I42.9 CARDIOMYOPATHY, UNSPECIFIED TYPE: ICD-10-CM

## 2022-03-25 DIAGNOSIS — I15.2 HYPERTENSION ASSOCIATED WITH DIABETES: ICD-10-CM

## 2022-03-25 DIAGNOSIS — E78.5 HYPERLIPIDEMIA ASSOCIATED WITH TYPE 2 DIABETES MELLITUS: ICD-10-CM

## 2022-03-25 DIAGNOSIS — I50.22 CHRONIC SYSTOLIC CONGESTIVE HEART FAILURE: ICD-10-CM

## 2022-03-25 DIAGNOSIS — G31.84 MCI (MILD COGNITIVE IMPAIRMENT): ICD-10-CM

## 2022-03-25 DIAGNOSIS — G47.33 OBSTRUCTIVE SLEEP APNEA SYNDROME: Chronic | ICD-10-CM

## 2022-03-25 DIAGNOSIS — F41.9 ANXIETY: ICD-10-CM

## 2022-03-25 DIAGNOSIS — M10.9 ACUTE GOUT OF FOOT, UNSPECIFIED CAUSE, UNSPECIFIED LATERALITY: ICD-10-CM

## 2022-03-25 DIAGNOSIS — C61 PROSTATE CANCER: ICD-10-CM

## 2022-03-25 DIAGNOSIS — M17.0 PRIMARY OSTEOARTHRITIS OF BOTH KNEES: ICD-10-CM

## 2022-03-25 DIAGNOSIS — E11.59 HYPERTENSION ASSOCIATED WITH DIABETES: ICD-10-CM

## 2022-03-25 DIAGNOSIS — I48.0 PAROXYSMAL ATRIAL FIBRILLATION: ICD-10-CM

## 2022-03-25 DIAGNOSIS — E66.01 MORBID OBESITY: ICD-10-CM

## 2022-03-25 DIAGNOSIS — D63.1 ANEMIA DUE TO STAGE 3B CHRONIC KIDNEY DISEASE: ICD-10-CM

## 2022-03-25 DIAGNOSIS — N18.32 ANEMIA DUE TO STAGE 3B CHRONIC KIDNEY DISEASE: ICD-10-CM

## 2022-03-25 DIAGNOSIS — N18.32 STAGE 3B CHRONIC KIDNEY DISEASE: ICD-10-CM

## 2022-03-25 DIAGNOSIS — D68.59 HYPERCOAGULABLE STATE: ICD-10-CM

## 2022-03-25 DIAGNOSIS — N18.32 TYPE 2 DIABETES MELLITUS WITH STAGE 3B CHRONIC KIDNEY DISEASE, WITHOUT LONG-TERM CURRENT USE OF INSULIN: Primary | Chronic | ICD-10-CM

## 2022-03-25 PROCEDURE — 3078F PR MOST RECENT DIASTOLIC BLOOD PRESSURE < 80 MM HG: ICD-10-PCS | Mod: CPTII,S$GLB,, | Performed by: FAMILY MEDICINE

## 2022-03-25 PROCEDURE — 3044F HG A1C LEVEL LT 7.0%: CPT | Mod: CPTII,S$GLB,, | Performed by: FAMILY MEDICINE

## 2022-03-25 PROCEDURE — 1101F PR PT FALLS ASSESS DOC 0-1 FALLS W/OUT INJ PAST YR: ICD-10-PCS | Mod: CPTII,S$GLB,, | Performed by: FAMILY MEDICINE

## 2022-03-25 PROCEDURE — 99214 PR OFFICE/OUTPT VISIT, EST, LEVL IV, 30-39 MIN: ICD-10-PCS | Mod: S$GLB,,, | Performed by: FAMILY MEDICINE

## 2022-03-25 PROCEDURE — 4010F PR ACE/ARB THEARPY RXD/TAKEN: ICD-10-PCS | Mod: CPTII,S$GLB,, | Performed by: FAMILY MEDICINE

## 2022-03-25 PROCEDURE — 1159F PR MEDICATION LIST DOCUMENTED IN MEDICAL RECORD: ICD-10-PCS | Mod: CPTII,S$GLB,, | Performed by: FAMILY MEDICINE

## 2022-03-25 PROCEDURE — 3008F BODY MASS INDEX DOCD: CPT | Mod: CPTII,S$GLB,, | Performed by: FAMILY MEDICINE

## 2022-03-25 PROCEDURE — 99214 OFFICE O/P EST MOD 30 MIN: CPT | Mod: S$GLB,,, | Performed by: FAMILY MEDICINE

## 2022-03-25 PROCEDURE — 99499 RISK ADDL DX/OHS AUDIT: ICD-10-PCS | Mod: S$GLB,,, | Performed by: FAMILY MEDICINE

## 2022-03-25 PROCEDURE — 99499 UNLISTED E&M SERVICE: CPT | Mod: S$GLB,,, | Performed by: FAMILY MEDICINE

## 2022-03-25 PROCEDURE — 3288F FALL RISK ASSESSMENT DOCD: CPT | Mod: CPTII,S$GLB,, | Performed by: FAMILY MEDICINE

## 2022-03-25 PROCEDURE — 3044F PR MOST RECENT HEMOGLOBIN A1C LEVEL <7.0%: ICD-10-PCS | Mod: CPTII,S$GLB,, | Performed by: FAMILY MEDICINE

## 2022-03-25 PROCEDURE — 1125F PR PAIN SEVERITY QUANTIFIED, PAIN PRESENT: ICD-10-PCS | Mod: CPTII,S$GLB,, | Performed by: FAMILY MEDICINE

## 2022-03-25 PROCEDURE — 1101F PT FALLS ASSESS-DOCD LE1/YR: CPT | Mod: CPTII,S$GLB,, | Performed by: FAMILY MEDICINE

## 2022-03-25 PROCEDURE — 1160F PR REVIEW ALL MEDS BY PRESCRIBER/CLIN PHARMACIST DOCUMENTED: ICD-10-PCS | Mod: CPTII,S$GLB,, | Performed by: FAMILY MEDICINE

## 2022-03-25 PROCEDURE — 3074F PR MOST RECENT SYSTOLIC BLOOD PRESSURE < 130 MM HG: ICD-10-PCS | Mod: CPTII,S$GLB,, | Performed by: FAMILY MEDICINE

## 2022-03-25 PROCEDURE — 3008F PR BODY MASS INDEX (BMI) DOCUMENTED: ICD-10-PCS | Mod: CPTII,S$GLB,, | Performed by: FAMILY MEDICINE

## 2022-03-25 PROCEDURE — 3288F PR FALLS RISK ASSESSMENT DOCUMENTED: ICD-10-PCS | Mod: CPTII,S$GLB,, | Performed by: FAMILY MEDICINE

## 2022-03-25 PROCEDURE — 1159F MED LIST DOCD IN RCRD: CPT | Mod: CPTII,S$GLB,, | Performed by: FAMILY MEDICINE

## 2022-03-25 PROCEDURE — 99999 PR PBB SHADOW E&M-EST. PATIENT-LVL III: ICD-10-PCS | Mod: PBBFAC,,, | Performed by: FAMILY MEDICINE

## 2022-03-25 PROCEDURE — 3078F DIAST BP <80 MM HG: CPT | Mod: CPTII,S$GLB,, | Performed by: FAMILY MEDICINE

## 2022-03-25 PROCEDURE — 1125F AMNT PAIN NOTED PAIN PRSNT: CPT | Mod: CPTII,S$GLB,, | Performed by: FAMILY MEDICINE

## 2022-03-25 PROCEDURE — 99999 PR PBB SHADOW E&M-EST. PATIENT-LVL III: CPT | Mod: PBBFAC,,, | Performed by: FAMILY MEDICINE

## 2022-03-25 PROCEDURE — 1160F RVW MEDS BY RX/DR IN RCRD: CPT | Mod: CPTII,S$GLB,, | Performed by: FAMILY MEDICINE

## 2022-03-25 PROCEDURE — 4010F ACE/ARB THERAPY RXD/TAKEN: CPT | Mod: CPTII,S$GLB,, | Performed by: FAMILY MEDICINE

## 2022-03-25 PROCEDURE — 3074F SYST BP LT 130 MM HG: CPT | Mod: CPTII,S$GLB,, | Performed by: FAMILY MEDICINE

## 2022-03-25 RX ORDER — HYDROCODONE BITARTRATE AND ACETAMINOPHEN 5; 325 MG/1; MG/1
1 TABLET ORAL EVERY 8 HOURS PRN
Qty: 90 TABLET | Refills: 0 | Status: CANCELLED | OUTPATIENT
Start: 2022-04-15 | End: 2022-05-14

## 2022-03-26 NOTE — PROGRESS NOTES
Subjective:       Patient ID: Richard Bustos is a 76 y.o. male.    Chief Complaint: Hyperlipidemia, Hypertension, Paroxysmal atrial fibrillation, Gastroesophageal Reflux, Chronic Kidney Disease, Congestive Heart Failure, Cardiomyopathy, Peripheral Vascular Disease, Prostate Cancer, and Hypercoagulable state    Patient presents here for 6 month follow-up of diabetes, hypertension, hyperlipidemia, paroxysmal atrial fibrillation, congestive heart failure, cardiomyopathy, PVD, GERD, prostate cancer, and hypercoagulable state.  He states he was hospitalized at [facility] in January 2022 for sepsis.  Review of the chart shows that his blood culture was positive for Streptococcus.  He finished his antibiotics and he had no residual effects.  His weight has decreased 11 lb in the last 6 months.  He states he has adjusted his diet to try to lose weight and I have encouraged him to continue this as his BMI is still very elevated at 43.38.  Due to his chronic medical issues, he really cannot exercise hardly at all.  He does get short of breath with just very mild exertion.  His diabetes is well controlled as his last hemoglobin A1c on 01/06/2022 was 5.1%.  He does have diabetic nephropathy but no diabetic neuropathy or retinopathy.  He denies any hypoglycemia.  His hypertension is well controlled with his present medication and his low-sodium diet.  His hyperlipidemia is also very well controlled with his present use of Lipitor 80 mg daily and his low-fat low-cholesterol diet.  He is followed by Cardiology for his paroxysmal atrial fibrillation, CHF, and cardiomyopathy.  Last cardiology note was reviewed and all of these are stable at this particular time.  His last echo showed an ejection fraction of 40%.  He is on Coumadin for anticoagulation due to his paroxysmal atrial fibrillation and he is being seen in the Coumadin Clinic.  His INR is in the therapeutic range.  It should be noted that he is also on  Coumadin due to the fact that he has hypercoagulable state due to MTHFR mutation and this requires treatment with Coumadin.  He does have peripheral vascular disease but he does not walk very far and therefore does not have any claudication.  He denies any rest pain.  He does have a history of prostate cancer also and has been followed by Urology.  His last PSA in January 2022 was 0.03.  In addition to Cardiology and Urology, he is also followed by pain management, Nephrology, GI, and Podiatry.  His only complaint today in addition to his chronic issues is knee pain.  This is longstanding it is secondary to osteoarthritis and the patient knows he cannot take any anti-inflammatories.  He can take Tylenol for the pain.  As far as health maintenance, he is up-to-date with all of his immunizations and recommended screening exams except for the new shingles vaccine, lipid profile, and urine for microalbumin.  I will order the lipid and urine for microalbumin with his next blood draw from his hematologist.  It also should be noted that he is followed by Hematology for chronic anemia.    Hyperlipidemia  This is a chronic problem. The problem is controlled. Recent lipid tests were reviewed and are normal. Exacerbating diseases include chronic renal disease, diabetes and obesity. Factors aggravating his hyperlipidemia include beta blockers. Associated symptoms include shortness of breath (With mild exertion). Pertinent negatives include no chest pain. Current antihyperlipidemic treatment includes statins. The current treatment provides significant improvement of lipids. Compliance problems include adherence to exercise.  Risk factors for coronary artery disease include diabetes mellitus, hypertension, male sex, obesity and a sedentary lifestyle.   Hypertension  This is a chronic problem. The problem is unchanged. The problem is controlled. Associated symptoms include malaise/fatigue, peripheral edema and shortness of breath  (With mild exertion). Pertinent negatives include no chest pain, headaches or palpitations. Risk factors for coronary artery disease include diabetes mellitus, dyslipidemia, male gender, obesity and sedentary lifestyle. Past treatments include beta blockers and ACE inhibitors. The current treatment provides significant improvement. Compliance problems include exercise.  Hypertensive end-organ damage includes kidney disease, CAD/MI, heart failure and PVD. Identifiable causes of hypertension include chronic renal disease.     Review of Systems   Constitutional: Positive for malaise/fatigue and unexpected weight change (Weight is decreased 11 lb in the last 6 months). Negative for chills, fatigue and fever.   HENT: Negative for nasal congestion, postnasal drip and sore throat.    Eyes: Negative for pain and visual disturbance.   Respiratory: Positive for shortness of breath (With mild exertion). Negative for cough and wheezing.    Cardiovascular: Positive for leg swelling. Negative for chest pain and palpitations.   Gastrointestinal: Negative for abdominal pain, diarrhea, nausea and vomiting.   Endocrine: Negative for polydipsia and polyuria.   Genitourinary: Negative for difficulty urinating, dysuria, flank pain and frequency.        Nocturia x3 per night   Musculoskeletal: Positive for arthralgias and back pain.   Neurological: Negative for dizziness, light-headedness and headaches.   Hematological: Negative for adenopathy. Bruises/bleeds easily.   Psychiatric/Behavioral: Negative for sleep disturbance. The patient is not nervous/anxious.          Objective:      Physical Exam  Vitals reviewed.   Constitutional:       General: He is not in acute distress.     Appearance: Normal appearance. He is well-developed. He is obese.   HENT:      Right Ear: Tympanic membrane and external ear normal.      Left Ear: Tympanic membrane and external ear normal.      Mouth/Throat:      Pharynx: Oropharynx is clear. No posterior  oropharyngeal erythema.   Neck:      Thyroid: No thyromegaly.   Cardiovascular:      Rate and Rhythm: Normal rate and regular rhythm.      Pulses:           Carotid pulses are 2+ on the right side and 2+ on the left side.       Radial pulses are 2+ on the right side and 2+ on the left side.        Popliteal pulses are 0 on the right side and 0 on the left side.        Dorsalis pedis pulses are 0 on the right side and 0 on the left side.      Heart sounds: Normal heart sounds. No murmur heard.  Pulmonary:      Effort: Pulmonary effort is normal.      Breath sounds: Normal breath sounds. No wheezing or rales.   Musculoskeletal:         General: Normal range of motion.      Cervical back: Normal range of motion and neck supple.      Right lower leg: Edema present.      Left lower leg: Edema present.   Lymphadenopathy:      Cervical: No cervical adenopathy.   Neurological:      General: No focal deficit present.      Mental Status: He is alert and oriented to person, place, and time.      Cranial Nerves: No cranial nerve deficit.      Deep Tendon Reflexes: Reflexes are normal and symmetric.   Psychiatric:         Mood and Affect: Mood normal.         Behavior: Behavior normal.         Assessment:       Problem List Items Addressed This Visit     Diabetes mellitus with chronic kidney disease, stage III - Primary (Chronic)    Relevant Orders    Microalbumin/Creatinine Ratio, Urine    Sleep apnea, using BiPAP nightly, 1999, last sleep test in 2013 (Chronic)    MTHFR mutation (methylenetetrahydrofolate reductase)    Hypertension associated with diabetes    Hyperlipidemia associated with type 2 diabetes mellitus    Relevant Orders    Lipid Panel    Gout    GERD (gastroesophageal reflux disease)    Hypercoagulable state, Prothrombin mutation    Anxiety    Morbid obesity, BMI 42.8    CKD (chronic kidney disease) stage 3, GFR 30-59 ml/min, 41    Long term (current) use of anticoagulants    Osteoarthritis, knee    Paroxysmal  atrial fibrillation    Prostate cancer    Peripheral vascular disease    Anemia due to stage 3 chronic kidney disease    MCI (mild cognitive impairment)    Cardiomyopathy    Chronic systolic congestive heart failure      Other Visit Diagnoses     Chronic pain disorder              Plan:       1. Continue present medication as all of his chronic medical problems noted above are stable at this time  2. Continue diabetic, low-sodium, low-fat low-cholesterol diet and try to get some very low level exercise.  BMI today is 43.38  3. Lipid profile and urine for microalbumin with his next labs  4. Continue follow-up with all of his various specialists as scheduled  5. Follow up with me in 6 months or p.r.n.

## 2022-03-29 NOTE — PROGRESS NOTES
[facility] Hematology/Oncology  PROGRESS NOTE      Subjective:       Patient ID:   NAME: Richard Bustos : 1945     76 y.o. male    Referring Doc: Ashley  Other Physicians: Bebeto Torrez Chamberlain    Chief Complaint:  Clot disorder f/u    History of Present Illness:     Patient returns today for a regularly scheduled follow-up visit.  The patient is doing ok overall with right foot . He has been under the care of wound care for the left foot stasis ulcers and Dr Torrez with podiatry and is improved per his report .  He is breathing ok. He denies any current fever, CP, SOB, HA's or N/V. He is here with his wife today      He saw Dr Ramirez last week; he sees Dr Torrez tomorrow    No excessive bleeding or bruising    Chronic aches and pains    Discussed covid19 precaution - he had his vaccinations and is planning to get the booster                ROS:   GEN: normal without any fever, night sweats or weight loss; chronic leg and foot issues  HEENT: normal with no HA's, sore throat, stiff neck, changes in vision  CV: normal with no CP, SOB, PND, THOMPSON or orthopnea  PULM: normal with no SOB, cough, hemoptysis, sputum or pleuritic pain  GI: normal with no abdominal pain, nausea, vomiting, constipation, diarrhea, melanotic stools, BRBPR, or hematemesis  : normal with no hematuria, dysuria  BREAST: normal with no mass, discharge, pain  SKIN: normal with no rash, erythema, bruising, or swelling    Allergies:  Review of patient's allergies indicates:   Allergen Reactions    Shellfish containing products Other (See Comments)     Other reaction(s): Gout       Medications:    Current Outpatient Medications:     albuterol (VENTOLIN HFA) 90 mcg/actuation inhaler, Inhale 2 puffs into the lungs every 6 (six) hours as needed for Wheezing or Shortness of Breath. Rescue, Disp: 1 g, Rfl: 2    allopurinoL (ZYLOPRIM) 100 MG tablet, Take 1 tablet (100 mg total) by mouth every evening., Disp: 90 tablet, Rfl: 3    ammonium lactate 12 %  Crea, Apply twice daily to affected parts both feet as needed., Disp: 140 g, Rfl: 11    aspirin (ECOTRIN) 81 MG EC tablet, Take 81 mg by mouth once daily., Disp: , Rfl:     atorvastatin (LIPITOR) 80 MG tablet, Take 1 tablet (80 mg total) by mouth once daily., Disp: 90 tablet, Rfl: 3    calcitRIOL (ROCALTROL) 0.5 MCG Cap, 0.75 mcg once daily. , Disp: , Rfl: 4    carvediloL (COREG) 25 MG tablet, Take 1 tablet (25 mg total) by mouth 2 (two) times daily with meals., Disp: 180 tablet, Rfl: 3    ciclopirox (LOPROX) 0.77 % Crea, Apply topically 2 (two) times daily., Disp: 90 g, Rfl: 2    ciclopirox (PENLAC) 8 % Soln, Apply topically nightly., Disp: 6.6 mL, Rfl: 11    clopidogreL (PLAVIX) 75 mg tablet, Take 1 tablet (75 mg total) by mouth once daily., Disp: 90 tablet, Rfl: 3    famotidine (PEPCID) 40 MG tablet, Take 1 tablet (40 mg total) by mouth once daily., Disp: 90 tablet, Rfl: 3    ferrous sulfate (FEROSUL) 325 mg (65 mg iron) Tab tablet, TAKE 1 TABLET BY MOUTH TWICE DAILY, Disp: 180 tablet, Rfl: 3    fluticasone propionate (FLONASE) 50 mcg/actuation nasal spray, SHAKE LIQUID AND USE 2 SPRAYS(100 MCG) IN EACH NOSTRIL EVERY DAY, Disp: 16 g, Rfl: 5    FOLIC ACID/MULTIVIT-MIN/LUTEIN (CENTRUM SILVER ORAL), Take 1 tablet by mouth once daily., Disp: , Rfl:     HYDROcodone-acetaminophen (NORCO) 5-325 mg per tablet, Take 1 tablet by mouth every 8 (eight) hours as needed for Pain. Medically necessary for greater than 7 days for chronic pain, Disp: 90 tablet, Rfl: 0    hydrocortisone 2.5 % cream, Apply topically 2 (two) times daily., Disp: 1 Tube, Rfl: 0    ipratropium (ATROVENT) 42 mcg (0.06 %) nasal spray, 42 sprays by Nasal route., Disp: , Rfl:     LIDOcaine HCL 2% (XYLOCAINE) 2 % jelly, Apply topically as needed. Apply topically once nightly to affected part of foot/feet., Disp: 30 mL, Rfl: 2    lisinopriL (PRINIVIL,ZESTRIL) 40 MG tablet, Take 1 tablet (40 mg total) by mouth every evening., Disp: 90 tablet,  "Rfl: 3    magnesium oxide (MAG-OX) 400 mg (241.3 mg magnesium) tablet, Take 1 tablet (400 mg total) by mouth once daily., Disp: 90 tablet, Rfl: 3    mecobal-levomefolat Ca-B6 phos (L-METHYL-B6-B12) 3-35-2 mg Tab, Take 1 tablet by mouth 2 (two) times daily., Disp: 180 tablet, Rfl: 3    mirabegron (MYRBETRIQ) 50 mg Tb24, Take 1 tablet (50 mg total) by mouth once daily., Disp: 90 tablet, Rfl: 0    nitroGLYCERIN 0.4 MG/DOSE TL SPRY (NITROLINGUAL) 400 mcg/spray spray, PLACE 1 SPRAY UNDER THE TONGUE EVERY 5 MINUTES AS NEEDED FOR CHEST PAIN, Disp: 4.9 g, Rfl: 9    nystatin (MYCOSTATIN) powder, Apply topically 2 (two) times daily., Disp: 60 g, Rfl: 11    omeprazole (PRILOSEC) 20 MG capsule, Take 1 capsule (20 mg total) by mouth once daily., Disp: 90 capsule, Rfl: 3    prothrombin time/INR test metr Misc, 1 Stick by Misc.(Non-Drug; Combo Route) route every 30 days., Disp: 1 each, Rfl: 0    traZODone (DESYREL) 50 MG tablet, Take 1 tablet (50 mg total) by mouth every evening., Disp: 90 tablet, Rfl: 3    vit A/vit C/vit E/zinc/copper (PRESERVISION AREDS ORAL), Take by mouth 2 (two) times a day. , Disp: , Rfl:     warfarin (COUMADIN) 5 MG tablet, 5 mg except on wednesday and saturday Wed and sat 2.5 mgs, Disp: 90 tablet, Rfl: 3  No current facility-administered medications for this visit.    Facility-Administered Medications Ordered in Other Visits:     lactated ringers infusion, , Intravenous, Once PRN, Evangelist Bose MD    PMHx/PSHx Updates:  See patient's last visit with me on 9/29/2021  See H&P on 4/9/2014        Pathology:  Cancer Staging  No matching staging information was found for the patient.          Objective:     Vitals:  Blood pressure 136/63, pulse (!) 53, resp. rate 18, height 5' 6" (1.676 m), weight 121.6 kg (268 lb).    Physical Examination:   GEN: no apparent distress, comfortable; AAOx3; overweight  HEAD: atraumatic and normocephalic  EYES: no pallor, no icterus, PERRLA  ENT: OMM, no pharyngeal " erythema, external ears WNL; no nasal discharge; no thrush  NECK: no masses, thyroid normal, trachea midline, no LAD/LN's, supple  CV: RRR with no murmur; normal pulse; normal S1 and S2; no pedal edema  CHEST: Normal respiratory effort; CTAB; normal breath sounds; no wheeze or crackles  ABDOM: nontender and nondistended; soft; normal bowel sounds; no rebound/guarding; overweight  MUSC/Skeletal: ROM normal; no crepitus; joints normal; no deformities or arthropathy; uses cane/walker to walk  EXTREM: erythematous changes on bilateral lower extremities;feet are better; left foot in boot    SKIN: no rashes, petechiae or subcutaneous nodules; prior skin cancer on face s/p cream (resolved); chronic skin changes  : no mcdaniels  NEURO: grossly intact; motor/sensory WNL; AAOx3; no tremors  PSYCH: normal mood, affect and behavior  LYMPH: normal cervical, supraclavicular, axillary and groin LN's            Labs:          Lab Results   Component Value Date    WBC 9.16 03/14/2022    HGB 10.9 (L) 03/14/2022    HCT 34.2 (L) 03/14/2022     (H) 03/14/2022     03/14/2022          BMP  Lab Results   Component Value Date     03/14/2022    K 5.0 03/14/2022     (H) 03/14/2022    CO2 21 (L) 03/14/2022    BUN 27 (H) 03/14/2022    CREATININE 1.5 (H) 03/14/2022    CALCIUM 8.8 03/14/2022    ANIONGAP 9 03/14/2022    ESTGFRAFRICA 52.3 (A) 03/14/2022    EGFRNONAA 45.2 (A) 03/14/2022          Lab Results   Component Value Date    ALT 25 03/14/2022    AST 28 03/14/2022    ALKPHOS 102 03/14/2022    BILITOT 0.4 03/14/2022        Lab Results   Component Value Date    INR 2.8 03/29/2022    INR 2.7 03/23/2022    INR 2.6 03/15/2022     Lab Results   Component Value Date    IRON 83 03/14/2022    TIBC 164 (L) 03/14/2022    FERRITIN 1,015 (H) 03/14/2022         Radiology/Diagnostic Studies:    No results found.    I have reviewed all available lab results and radiology reports.    Assessment/Plan:   (1) 76 y.o. male with diagnosis  of chronic clot disorder with underlying MTHFR issues  - he was previously under the care of Dr Krunal Rodriguez with hematology at [facility]  - he has been on coumadin therapy per direction of Dr Tate with cardiology  - he has been having it adjusted by cardiology  - latest INR was 2.4 - followed and under direction of care by Dr Tate    2/25/2021:  - latest INR adequate at 2.3    9/29/2021:  - latest INR at 2.1    3/30/2022:  - INR at 2.8  - followed by Dr Tate      (2) CAD - followed by Dr Tate    (3) Abdominal hematoma in past     (4) CRI - followed by Nephrology (Amy?)    (5) Chronic multifactorial anemia with underlying anemia of chronic disorders and chronic renal disease; chronic GIB  - latest hgb at  11.0 and relatively stable  - he is on oral iron  - latest iron panel pending still    2/25/2021:  - latest hgb at 10.2 and a little lower  -  iron is adequate 67    9/29/2021:  - latest hgb 10.3 and relatively stable  - iron was 74    3/30/2022:  - hgb 10.9 and adequate for him    (6) Diabetic ulcer/ left toe issues - followed by Dr Torrez - improved per patient    (7) Urology following for prostate - Dr Moss        1. Anemia due to stage 3b chronic kidney disease     2. Prostate cancer     3. Hypercoagulable state, Prothrombin mutation     4. Long term (current) use of anticoagulants     5. MTHFR mutation (methylenetetrahydrofolate reductase)     6. H/O bariatric surgery, 2008           PLAN:  1. Check labs every 3 months  2. F/u with PCP, card, and neph  3. Continue coumadin per [facility] direction  4. RTC in 6 months    Fax note to Bebeto Ramirez; Shen Torrez    Discussion:       COVID-19 Discussion:    I had long discussion with patient and any applicable family about the COVID-19 coronavirus epidemic and the recommended precautions with regard to cancer and/or hematology patients. I have re-iterated the CDC recommendations for adequate hand washing, use of hand -like products, and  coughing into elbow, etc. In addition, especially for our patients who are on chemotherapy and/or our otherwise immunocompromised patients, I have recommended avoidance of crowds, including movie theaters, restaurants, churches, etc. I have recommended avoidance of any sick or symptomatic family members and/or friends. I have also recommended avoidance of any raw and unwashed food products, and general avoidance of food items that have not been prepared by themselves. The patient has been asked to call us immediately with any symptom developments, issues, questions or other general concerns.       Anticoagulation Discussion:    Discussed with patient and any applicable family members about the benefit and/or need for anticoagulation. I communicated about the risks of bleeding while on any anticoagulation, which could be serious and/or life-threatening, and which can occur at any time, regardless of degree of the level of anticoagulation. I expressed the need for compliance with any anticoagulation regimen and that failure to do so could potential lead to excessive bleeding, and risk to health and/or life. In particular, with patients on coumadin therapy, compliance with requested blood work is absolutely essential, as coumadin levels can vary from time to time, and failure to do so could potentially place the patient at risk for bleeding and/or clotting events which could be fatal. Patients on coumadin are encouraged to call the day after they have their levels drawn, as to obtain the appropriate instructions from my staff. Patients are aware that self-regulating or self-dosing of their medications is strictly prohibited.       I have explained all of the above in detail and the patient understands all of the current recommendation(s). I have answered all of their questions to the best of my ability and to their complete satisfaction.   The patient is to continue with the current management  plan.            Electronically signed by Kai Ortiz MD

## 2022-03-30 ENCOUNTER — PATIENT OUTREACH (OUTPATIENT)
Dept: ADMINISTRATIVE | Facility: OTHER | Age: 77
End: 2022-03-30
Payer: MEDICARE

## 2022-03-30 ENCOUNTER — OFFICE VISIT (OUTPATIENT)
Dept: HEMATOLOGY/ONCOLOGY | Facility: CLINIC | Age: 77
End: 2022-03-30
Payer: MEDICARE

## 2022-03-30 VITALS
BODY MASS INDEX: 43.07 KG/M2 | RESPIRATION RATE: 18 BRPM | HEIGHT: 66 IN | WEIGHT: 268 LBS | DIASTOLIC BLOOD PRESSURE: 63 MMHG | SYSTOLIC BLOOD PRESSURE: 136 MMHG | HEART RATE: 53 BPM

## 2022-03-30 DIAGNOSIS — D63.1 ANEMIA DUE TO STAGE 3B CHRONIC KIDNEY DISEASE: Primary | ICD-10-CM

## 2022-03-30 DIAGNOSIS — Z98.84 H/O BARIATRIC SURGERY: ICD-10-CM

## 2022-03-30 DIAGNOSIS — Z15.89 MTHFR MUTATION (METHYLENETETRAHYDROFOLATE REDUCTASE): ICD-10-CM

## 2022-03-30 DIAGNOSIS — Z79.01 LONG TERM (CURRENT) USE OF ANTICOAGULANTS: ICD-10-CM

## 2022-03-30 DIAGNOSIS — C61 PROSTATE CANCER: ICD-10-CM

## 2022-03-30 DIAGNOSIS — N18.32 ANEMIA DUE TO STAGE 3B CHRONIC KIDNEY DISEASE: Primary | ICD-10-CM

## 2022-03-30 DIAGNOSIS — D68.59 HYPERCOAGULABLE STATE: ICD-10-CM

## 2022-03-30 PROCEDURE — 4010F PR ACE/ARB THEARPY RXD/TAKEN: ICD-10-PCS | Mod: S$GLB,,, | Performed by: INTERNAL MEDICINE

## 2022-03-30 PROCEDURE — 1159F MED LIST DOCD IN RCRD: CPT | Mod: S$GLB,,, | Performed by: INTERNAL MEDICINE

## 2022-03-30 PROCEDURE — 1160F RVW MEDS BY RX/DR IN RCRD: CPT | Mod: S$GLB,,, | Performed by: INTERNAL MEDICINE

## 2022-03-30 PROCEDURE — 1159F PR MEDICATION LIST DOCUMENTED IN MEDICAL RECORD: ICD-10-PCS | Mod: S$GLB,,, | Performed by: INTERNAL MEDICINE

## 2022-03-30 PROCEDURE — 3075F SYST BP GE 130 - 139MM HG: CPT | Mod: S$GLB,,, | Performed by: INTERNAL MEDICINE

## 2022-03-30 PROCEDURE — 3078F DIAST BP <80 MM HG: CPT | Mod: S$GLB,,, | Performed by: INTERNAL MEDICINE

## 2022-03-30 PROCEDURE — 3008F BODY MASS INDEX DOCD: CPT | Mod: S$GLB,,, | Performed by: INTERNAL MEDICINE

## 2022-03-30 PROCEDURE — 1125F AMNT PAIN NOTED PAIN PRSNT: CPT | Mod: S$GLB,,, | Performed by: INTERNAL MEDICINE

## 2022-03-30 PROCEDURE — 1125F PR PAIN SEVERITY QUANTIFIED, PAIN PRESENT: ICD-10-PCS | Mod: S$GLB,,, | Performed by: INTERNAL MEDICINE

## 2022-03-30 PROCEDURE — 3288F FALL RISK ASSESSMENT DOCD: CPT | Mod: S$GLB,,, | Performed by: INTERNAL MEDICINE

## 2022-03-30 PROCEDURE — 3008F PR BODY MASS INDEX (BMI) DOCUMENTED: ICD-10-PCS | Mod: S$GLB,,, | Performed by: INTERNAL MEDICINE

## 2022-03-30 PROCEDURE — 1160F PR REVIEW ALL MEDS BY PRESCRIBER/CLIN PHARMACIST DOCUMENTED: ICD-10-PCS | Mod: S$GLB,,, | Performed by: INTERNAL MEDICINE

## 2022-03-30 PROCEDURE — 99213 OFFICE O/P EST LOW 20 MIN: CPT | Mod: S$GLB,,, | Performed by: INTERNAL MEDICINE

## 2022-03-30 PROCEDURE — 1101F PT FALLS ASSESS-DOCD LE1/YR: CPT | Mod: S$GLB,,, | Performed by: INTERNAL MEDICINE

## 2022-03-30 PROCEDURE — 3288F PR FALLS RISK ASSESSMENT DOCUMENTED: ICD-10-PCS | Mod: S$GLB,,, | Performed by: INTERNAL MEDICINE

## 2022-03-30 PROCEDURE — 3044F HG A1C LEVEL LT 7.0%: CPT | Mod: S$GLB,,, | Performed by: INTERNAL MEDICINE

## 2022-03-30 PROCEDURE — 4010F ACE/ARB THERAPY RXD/TAKEN: CPT | Mod: S$GLB,,, | Performed by: INTERNAL MEDICINE

## 2022-03-30 PROCEDURE — 3044F PR MOST RECENT HEMOGLOBIN A1C LEVEL <7.0%: ICD-10-PCS | Mod: S$GLB,,, | Performed by: INTERNAL MEDICINE

## 2022-03-30 PROCEDURE — 3078F PR MOST RECENT DIASTOLIC BLOOD PRESSURE < 80 MM HG: ICD-10-PCS | Mod: S$GLB,,, | Performed by: INTERNAL MEDICINE

## 2022-03-30 PROCEDURE — 99213 PR OFFICE/OUTPT VISIT, EST, LEVL III, 20-29 MIN: ICD-10-PCS | Mod: S$GLB,,, | Performed by: INTERNAL MEDICINE

## 2022-03-30 PROCEDURE — 1101F PR PT FALLS ASSESS DOC 0-1 FALLS W/OUT INJ PAST YR: ICD-10-PCS | Mod: S$GLB,,, | Performed by: INTERNAL MEDICINE

## 2022-03-30 PROCEDURE — 3075F PR MOST RECENT SYSTOLIC BLOOD PRESS GE 130-139MM HG: ICD-10-PCS | Mod: S$GLB,,, | Performed by: INTERNAL MEDICINE

## 2022-03-31 ENCOUNTER — OFFICE VISIT (OUTPATIENT)
Dept: PODIATRY | Facility: CLINIC | Age: 77
End: 2022-03-31
Payer: MEDICARE

## 2022-03-31 VITALS
BODY MASS INDEX: 43.07 KG/M2 | HEART RATE: 61 BPM | DIASTOLIC BLOOD PRESSURE: 59 MMHG | HEIGHT: 66 IN | SYSTOLIC BLOOD PRESSURE: 130 MMHG | WEIGHT: 268 LBS

## 2022-03-31 DIAGNOSIS — Z87.898 HISTORY OF ULCERATION: ICD-10-CM

## 2022-03-31 DIAGNOSIS — B35.1 ONYCHOMYCOSIS DUE TO DERMATOPHYTE: ICD-10-CM

## 2022-03-31 DIAGNOSIS — Z79.01 LONG TERM (CURRENT) USE OF ANTICOAGULANTS: ICD-10-CM

## 2022-03-31 DIAGNOSIS — M21.622 TAILOR'S BUNION OF LEFT FOOT: ICD-10-CM

## 2022-03-31 DIAGNOSIS — E11.49 TYPE II DIABETES MELLITUS WITH NEUROLOGICAL MANIFESTATIONS: Primary | ICD-10-CM

## 2022-03-31 PROCEDURE — 11721 DEBRIDE NAIL 6 OR MORE: CPT | Mod: 59,Q9,S$GLB, | Performed by: PODIATRIST

## 2022-03-31 PROCEDURE — 1159F PR MEDICATION LIST DOCUMENTED IN MEDICAL RECORD: ICD-10-PCS | Mod: CPTII,S$GLB,, | Performed by: PODIATRIST

## 2022-03-31 PROCEDURE — 3075F PR MOST RECENT SYSTOLIC BLOOD PRESS GE 130-139MM HG: ICD-10-PCS | Mod: CPTII,S$GLB,, | Performed by: PODIATRIST

## 2022-03-31 PROCEDURE — 3008F PR BODY MASS INDEX (BMI) DOCUMENTED: ICD-10-PCS | Mod: CPTII,S$GLB,, | Performed by: PODIATRIST

## 2022-03-31 PROCEDURE — 11721 PR DEBRIDEMENT OF NAILS, 6 OR MORE: ICD-10-PCS | Mod: 59,Q9,S$GLB, | Performed by: PODIATRIST

## 2022-03-31 PROCEDURE — 1101F PR PT FALLS ASSESS DOC 0-1 FALLS W/OUT INJ PAST YR: ICD-10-PCS | Mod: CPTII,S$GLB,, | Performed by: PODIATRIST

## 2022-03-31 PROCEDURE — 11055 PARING/CUTG B9 HYPRKER LES 1: CPT | Mod: Q9,S$GLB,, | Performed by: PODIATRIST

## 2022-03-31 PROCEDURE — 3288F PR FALLS RISK ASSESSMENT DOCUMENTED: ICD-10-PCS | Mod: CPTII,S$GLB,, | Performed by: PODIATRIST

## 2022-03-31 PROCEDURE — 99214 OFFICE O/P EST MOD 30 MIN: CPT | Mod: 25,S$GLB,, | Performed by: PODIATRIST

## 2022-03-31 PROCEDURE — 3008F BODY MASS INDEX DOCD: CPT | Mod: CPTII,S$GLB,, | Performed by: PODIATRIST

## 2022-03-31 PROCEDURE — 4010F PR ACE/ARB THEARPY RXD/TAKEN: ICD-10-PCS | Mod: CPTII,S$GLB,, | Performed by: PODIATRIST

## 2022-03-31 PROCEDURE — 11055 PR TRIM HYPERKERATOTIC SKIN LESION, ONE: ICD-10-PCS | Mod: Q9,S$GLB,, | Performed by: PODIATRIST

## 2022-03-31 PROCEDURE — 99999 PR PBB SHADOW E&M-EST. PATIENT-LVL III: CPT | Mod: PBBFAC,,, | Performed by: PODIATRIST

## 2022-03-31 PROCEDURE — 1101F PT FALLS ASSESS-DOCD LE1/YR: CPT | Mod: CPTII,S$GLB,, | Performed by: PODIATRIST

## 2022-03-31 PROCEDURE — 4010F ACE/ARB THERAPY RXD/TAKEN: CPT | Mod: CPTII,S$GLB,, | Performed by: PODIATRIST

## 2022-03-31 PROCEDURE — 3075F SYST BP GE 130 - 139MM HG: CPT | Mod: CPTII,S$GLB,, | Performed by: PODIATRIST

## 2022-03-31 PROCEDURE — 1159F MED LIST DOCD IN RCRD: CPT | Mod: CPTII,S$GLB,, | Performed by: PODIATRIST

## 2022-03-31 PROCEDURE — 99999 PR PBB SHADOW E&M-EST. PATIENT-LVL III: ICD-10-PCS | Mod: PBBFAC,,, | Performed by: PODIATRIST

## 2022-03-31 PROCEDURE — 1125F PR PAIN SEVERITY QUANTIFIED, PAIN PRESENT: ICD-10-PCS | Mod: CPTII,S$GLB,, | Performed by: PODIATRIST

## 2022-03-31 PROCEDURE — 99214 PR OFFICE/OUTPT VISIT, EST, LEVL IV, 30-39 MIN: ICD-10-PCS | Mod: 25,S$GLB,, | Performed by: PODIATRIST

## 2022-03-31 PROCEDURE — 1125F AMNT PAIN NOTED PAIN PRSNT: CPT | Mod: CPTII,S$GLB,, | Performed by: PODIATRIST

## 2022-03-31 PROCEDURE — 3288F FALL RISK ASSESSMENT DOCD: CPT | Mod: CPTII,S$GLB,, | Performed by: PODIATRIST

## 2022-03-31 PROCEDURE — 3044F PR MOST RECENT HEMOGLOBIN A1C LEVEL <7.0%: ICD-10-PCS | Mod: CPTII,S$GLB,, | Performed by: PODIATRIST

## 2022-03-31 PROCEDURE — 3078F DIAST BP <80 MM HG: CPT | Mod: CPTII,S$GLB,, | Performed by: PODIATRIST

## 2022-03-31 PROCEDURE — 3078F PR MOST RECENT DIASTOLIC BLOOD PRESSURE < 80 MM HG: ICD-10-PCS | Mod: CPTII,S$GLB,, | Performed by: PODIATRIST

## 2022-03-31 PROCEDURE — 3044F HG A1C LEVEL LT 7.0%: CPT | Mod: CPTII,S$GLB,, | Performed by: PODIATRIST

## 2022-03-31 RX ORDER — CICLOPIROX 80 MG/ML
SOLUTION TOPICAL NIGHTLY
Qty: 6.6 ML | Refills: 11 | Status: SHIPPED | OUTPATIENT
Start: 2022-03-31 | End: 2022-06-20

## 2022-03-31 NOTE — PROGRESS NOTES
Subjective:      Patient ID: Richard Bustos is a 76 y.o. male.    Chief Complaint: Bunions (Bilateral foot)    Richard is a 76 y.o. male who presents to the clinic for evaluation and treatment of high risk feet. Richard has a past medical history of Anemia, Anemia of other chronic disease (9/13/2017), Anemia, chronic renal failure (9/13/2017), Anemia, unspecified (9/13/2017), Anticoagulant long-term use, Aorta aneurysm, Arthritis, Atrial fibrillation, Bacteremia due to Streptococcus (1/8/2022), CAD (coronary artery disease), CHF (congestive heart failure), Chronic kidney disease, Clotting disorder (9/13/2017), Colon polyp, Dementia, Diabetes mellitus, Diabetes mellitus type II, Diverticulosis, Elevated PSA, Encounter for blood transfusion, Former smoker, General anesthetics causing adverse effect in therapeutic use, GERD (gastroesophageal reflux disease), Gout, colonic polyps, Hypercoagulable state, Hyperlipidemia, Hypertension, MI, old (2010), MTHFR mutation, Myocardial infarction, Osteomyelitis of ankle or foot (3/9/2017), Prostate cancer (06/2016), Sleep apnea, Squamous cell carcinoma (01/23/2018), and Venous stasis. The patient's chief complaint is long, thick toenails. Gradual onset, worsening over past several weeks, aggravated by increased weight bearing, shoe gear, pressure.  Periodic debridement help symptoms .    PCP: Familia Ramirez Jr, MD    Date Last Seen by PCP:   Chief Complaint   Patient presents with    Bunions     Bilateral foot         Current shoe gear:  Affected Foot: Rx diabetic extra depth shoes and custom accommodative insoles     Unaffected Foot: Rx diabetic extra depth shoes and custom accommodative insoles    Hemoglobin A1C   Date Value Ref Range Status   01/06/2022 5.1 4.0 - 5.6 % Final     Comment:     ADA Screening Guidelines:  5.7-6.4%  Consistent with prediabetes  >or=6.5%  Consistent with diabetes    High levels of fetal hemoglobin interfere with the HbA1C  assay. Heterozygous hemoglobin  variants (HbS, HgC, etc)do  not significantly interfere with this assay.   However, presence of multiple variants may affect accuracy.     08/18/2021 5.3 4.0 - 5.6 % Final     Comment:     ADA Screening Guidelines:  5.7-6.4%  Consistent with prediabetes  >or=6.5%  Consistent with diabetes    High levels of fetal hemoglobin interfere with the HbA1C  assay. Heterozygous hemoglobin variants (HbS, HgC, etc)do  not significantly interfere with this assay.   However, presence of multiple variants may affect accuracy.     11/17/2020 6.1 (H) 4.0 - 5.6 % Final     Comment:     ADA Screening Guidelines:  5.7-6.4%  Consistent with prediabetes  >or=6.5%  Consistent with diabetes  High levels of fetal hemoglobin interfere with the HbA1C  assay. Heterozygous hemoglobin variants (HbS, HgC, etc)do  not significantly interfere with this assay.   However, presence of multiple variants may affect accuracy.     06/17/2013 6.8 (H) 4.8 - 5.6 % Final     Comment:              Increased risk for diabetes: 5.7 - 6.4           Diabetes: >6.4           Glycemic control for adults with diabetes: <7.0  **In order to standardize %HbA1c results worldwide, as of October 11, 2010,  the %HbA1c is being calculated using the master equation recommended in the  consensus statement adopted by the ADA (American Diabetes Assoc), EASD  (European Assoc for the Study of Diabetes), IFCC (International Federation  of Clinical Chemistry and Laboratory Medicine) and IDF (International  Diabetes Federation). Result units: %HgbA1c (DCCT/NGSP).  In common with other methods, Hb A1C values may not accurately reflect mean  blood glucose in patients with hemoglobin variants (HgbF, HgbS and HgbC).  Any cause of shortened erythrocyte survival will reduce exposure of  erythrocytes to glucose with a consequent decrease in HbA1c (%) values, even  though the time-averaged blood glucose level may be elevated. Causes of  shortened erythrocyte lifetime might be hemolytic  anemia or other hemolytic  diseases, homozygous sickle cell trait, pregnancy, recent significant or  chronic blood loss, etc. Caution should be used when interpreting the HbA1c  results from patients with these conditions.       Review of Systems   Constitutional: Negative for chills, decreased appetite, diaphoresis, fever, malaise/fatigue and night sweats.   Cardiovascular: Positive for leg swelling. Negative for claudication, cyanosis and syncope.   Skin: Positive for nail changes, poor wound healing and suspicious lesions.   Neurological: Positive for numbness, paresthesias and sensory change.           Objective:      Physical Exam  Constitutional:       General: He is not in acute distress.     Appearance: He is well-developed. He is not diaphoretic.   Cardiovascular:      Pulses:           Dorsalis pedis pulses are 1+ on the right side and 1+ on the left side.        Posterior tibial pulses are 1+ on the right side and 1+ on the left side.      Comments: Capillary refill 3 seconds all toes/distal feet, all toes/both feet warm to touch.      Negative lymphadenopathy bilateral popliteal fossa and tarsal tunnel.     Trace lower extremity edema bilateral.    Musculoskeletal:      Right ankle: No swelling, deformity, ecchymosis or lacerations. Normal range of motion. Normal pulse.      Right Achilles Tendon: Normal. No defects. Chung's test negative.      Comments: Rigid hammertoes 2 through 5 right and left feet with left 2nd toe superiorly displaced all not reducible today but without loss of function or signs of acute trauma both feet.    Tailor bunion present 5th mtpj left with medial deviation of 5th toe, prominent bony bump lateral 5th mtpj, and pain to palpation without evidence of trauma or infection.         Lymphadenopathy:      Lower Body: No right inguinal adenopathy. No left inguinal adenopathy.      Comments: Negative lymphadenopathy bilateral popliteal fossa and tarsal tunnel.    Negative  lymphangitic streaking bilateral feet/ankles/legs.   Skin:     General: Skin is warm and dry.      Capillary Refill: Capillary refill takes 2 to 3 seconds.      Coloration: Skin is not pale.      Findings: No abrasion, bruising, burn, ecchymosis, erythema, laceration, lesion or rash.      Nails: There is no clubbing.      Comments:  Focal hyperkeratotic lesion consisting entirely of hyperkeratotic tissue without open skin, drainage, pus, fluctuance, malodor, or signs of infection plantar lateral left 5th mtpj.    Otherwise, Skin thin, shiny, atrophic, with decreased density and distribution of pedal hair bilateral, but without hyperpigmentation, minerva discoloration,  ulcers, masses, nodules or cords palpated bilateral feet and legs.    Toenails 1st, 2nd, 3rd, 4th, 5th  bilateral are hypertrophic thickened 2-3 mm, dystrophic, discolored tanish brown with tan, gray crumbly subungual debris.  Tender to distal nail plate pressure, without periungual skin abnormality of each.       Neurological:      Mental Status: He is alert and oriented to person, place, and time.      Sensory: Sensory deficit present.      Motor: No tremor, atrophy or abnormal muscle tone.      Gait: Gait normal.      Comments: Paresthesias, and burning bilateral feet with no clearly identified trigger or source.    Diminished/loss of protective sensation all toes bilateral to 10 gram monofilament.    Decreased/absent vibratory sensation bilateral feet to 128Hz tuning fork.     Psychiatric:         Behavior: Behavior is cooperative.               Assessment:       Encounter Diagnoses   Name Primary?    Type II diabetes mellitus with neurological manifestations Yes    History of ulceration     Onychomycosis due to dermatophyte     Long term (current) use of anticoagulants     Tailor's bunion of left foot          Plan:       Richard was seen today for bunions.    Diagnoses and all orders for this visit:    Type II diabetes mellitus with neurological  manifestations  -     DIABETIC SHOES FOR HOME USE  -      DIABETES FOOT EXAM    History of ulceration  -     DIABETIC SHOES FOR HOME USE  -      DIABETES FOOT EXAM    Onychomycosis due to dermatophyte  -     DIABETIC SHOES FOR HOME USE  -      DIABETES FOOT EXAM    Long term (current) use of anticoagulants  -     DIABETIC SHOES FOR HOME USE  -      DIABETES FOOT EXAM    Tailor's bunion of left foot  -     DIABETIC SHOES FOR HOME USE  -      DIABETES FOOT EXAM    Other orders  -     ciclopirox (PENLAC) 8 % Soln; Apply topically nightly.      I counseled the patient on his conditions, their implications and medical management.        - Shoe inspection. Diabetic Foot Education. Patient reminded of the importance of good nutrition and blood sugar control to help prevent podiatric complications of diabetes. Patient instructed on proper foot hygeine. We discussed wearing proper shoe gear, daily foot inspections, never walking without protective shoe gear, never putting sharp instruments to feet, routine podiatric visits at least annually.    Discussed conservative treatment with shoes of adequate dimensions, material, and style to alleviate symptoms and delay or prevent surgical intervention.    Spot stretch left shoe Rx written.    Continue penlac      - With patient's permission, nails were aggressively reduced and debrided x 10 to their soft tissue attachment mechanically , removing all offending nail and debris. Patient relates relief following the procedure. He will continue to monitor the areas daily, inspect his feet, wear protective shoe gear when ambulatory, moisturizer to maintain skin integrity and follow in this office  p.r.n.      With the patient's permission, I debrided hyperkeratotic lesion(s) as above totaling        1        to, not  Including dermis with sterile #15 blade.  Patient tolerated the procedure well and related significant relief.        Follow up in about 1 year (around  3/31/2023).

## 2022-04-13 ENCOUNTER — OFFICE VISIT (OUTPATIENT)
Dept: PAIN MEDICINE | Facility: CLINIC | Age: 77
End: 2022-04-13
Payer: MEDICARE

## 2022-04-13 ENCOUNTER — LAB VISIT (OUTPATIENT)
Dept: LAB | Facility: HOSPITAL | Age: 77
End: 2022-04-13
Attending: UROLOGY
Payer: MEDICARE

## 2022-04-13 VITALS — WEIGHT: 268 LBS | HEIGHT: 66 IN | BODY MASS INDEX: 43.07 KG/M2

## 2022-04-13 DIAGNOSIS — M47.896 OTHER SPONDYLOSIS, LUMBAR REGION: ICD-10-CM

## 2022-04-13 DIAGNOSIS — G89.29 CHRONIC PAIN OF BOTH KNEES: Primary | ICD-10-CM

## 2022-04-13 DIAGNOSIS — M25.561 CHRONIC PAIN OF BOTH KNEES: Primary | ICD-10-CM

## 2022-04-13 DIAGNOSIS — M25.562 CHRONIC PAIN OF BOTH KNEES: Primary | ICD-10-CM

## 2022-04-13 DIAGNOSIS — M48.00 CENTRAL STENOSIS OF SPINAL CANAL: ICD-10-CM

## 2022-04-13 DIAGNOSIS — C61 PROSTATE CANCER: ICD-10-CM

## 2022-04-13 DIAGNOSIS — M51.36 DDD (DEGENERATIVE DISC DISEASE), LUMBAR: ICD-10-CM

## 2022-04-13 DIAGNOSIS — G89.4 CHRONIC PAIN DISORDER: ICD-10-CM

## 2022-04-13 LAB — COMPLEXED PSA SERPL-MCNC: 0.03 NG/ML (ref 0–4)

## 2022-04-13 PROCEDURE — 3044F HG A1C LEVEL LT 7.0%: CPT | Mod: CPTII,S$GLB,, | Performed by: PHYSICIAN ASSISTANT

## 2022-04-13 PROCEDURE — 3008F PR BODY MASS INDEX (BMI) DOCUMENTED: ICD-10-PCS | Mod: CPTII,S$GLB,, | Performed by: PHYSICIAN ASSISTANT

## 2022-04-13 PROCEDURE — 1101F PT FALLS ASSESS-DOCD LE1/YR: CPT | Mod: CPTII,S$GLB,, | Performed by: PHYSICIAN ASSISTANT

## 2022-04-13 PROCEDURE — 4010F ACE/ARB THERAPY RXD/TAKEN: CPT | Mod: CPTII,S$GLB,, | Performed by: PHYSICIAN ASSISTANT

## 2022-04-13 PROCEDURE — 84153 ASSAY OF PSA TOTAL: CPT | Performed by: UROLOGY

## 2022-04-13 PROCEDURE — 1159F MED LIST DOCD IN RCRD: CPT | Mod: CPTII,S$GLB,, | Performed by: PHYSICIAN ASSISTANT

## 2022-04-13 PROCEDURE — 99999 PR PBB SHADOW E&M-EST. PATIENT-LVL II: ICD-10-PCS | Mod: PBBFAC,,, | Performed by: PHYSICIAN ASSISTANT

## 2022-04-13 PROCEDURE — 1159F PR MEDICATION LIST DOCUMENTED IN MEDICAL RECORD: ICD-10-PCS | Mod: CPTII,S$GLB,, | Performed by: PHYSICIAN ASSISTANT

## 2022-04-13 PROCEDURE — 4010F PR ACE/ARB THEARPY RXD/TAKEN: ICD-10-PCS | Mod: CPTII,S$GLB,, | Performed by: PHYSICIAN ASSISTANT

## 2022-04-13 PROCEDURE — 1125F AMNT PAIN NOTED PAIN PRSNT: CPT | Mod: CPTII,S$GLB,, | Performed by: PHYSICIAN ASSISTANT

## 2022-04-13 PROCEDURE — 3288F FALL RISK ASSESSMENT DOCD: CPT | Mod: CPTII,S$GLB,, | Performed by: PHYSICIAN ASSISTANT

## 2022-04-13 PROCEDURE — 3288F PR FALLS RISK ASSESSMENT DOCUMENTED: ICD-10-PCS | Mod: CPTII,S$GLB,, | Performed by: PHYSICIAN ASSISTANT

## 2022-04-13 PROCEDURE — 1125F PR PAIN SEVERITY QUANTIFIED, PAIN PRESENT: ICD-10-PCS | Mod: CPTII,S$GLB,, | Performed by: PHYSICIAN ASSISTANT

## 2022-04-13 PROCEDURE — 3044F PR MOST RECENT HEMOGLOBIN A1C LEVEL <7.0%: ICD-10-PCS | Mod: CPTII,S$GLB,, | Performed by: PHYSICIAN ASSISTANT

## 2022-04-13 PROCEDURE — 99214 OFFICE O/P EST MOD 30 MIN: CPT | Mod: S$GLB,,, | Performed by: PHYSICIAN ASSISTANT

## 2022-04-13 PROCEDURE — 99214 PR OFFICE/OUTPT VISIT, EST, LEVL IV, 30-39 MIN: ICD-10-PCS | Mod: S$GLB,,, | Performed by: PHYSICIAN ASSISTANT

## 2022-04-13 PROCEDURE — 3008F BODY MASS INDEX DOCD: CPT | Mod: CPTII,S$GLB,, | Performed by: PHYSICIAN ASSISTANT

## 2022-04-13 PROCEDURE — 36415 COLL VENOUS BLD VENIPUNCTURE: CPT | Performed by: UROLOGY

## 2022-04-13 PROCEDURE — 1101F PR PT FALLS ASSESS DOC 0-1 FALLS W/OUT INJ PAST YR: ICD-10-PCS | Mod: CPTII,S$GLB,, | Performed by: PHYSICIAN ASSISTANT

## 2022-04-13 PROCEDURE — 99999 PR PBB SHADOW E&M-EST. PATIENT-LVL II: CPT | Mod: PBBFAC,,, | Performed by: PHYSICIAN ASSISTANT

## 2022-04-13 RX ORDER — HYDROCODONE BITARTRATE AND ACETAMINOPHEN 5; 325 MG/1; MG/1
1 TABLET ORAL EVERY 8 HOURS PRN
Qty: 90 TABLET | Refills: 0 | Status: SHIPPED | OUTPATIENT
Start: 2022-05-14 | End: 2022-06-12

## 2022-04-13 RX ORDER — HYDROCODONE BITARTRATE AND ACETAMINOPHEN 5; 325 MG/1; MG/1
1 TABLET ORAL EVERY 8 HOURS PRN
Qty: 90 TABLET | Refills: 0 | Status: SHIPPED | OUTPATIENT
Start: 2022-06-12 | End: 2022-07-13 | Stop reason: SDUPTHER

## 2022-04-13 RX ORDER — HYDROCODONE BITARTRATE AND ACETAMINOPHEN 5; 325 MG/1; MG/1
1 TABLET ORAL EVERY 8 HOURS PRN
Qty: 90 TABLET | Refills: 0 | Status: SHIPPED | OUTPATIENT
Start: 2022-04-15 | End: 2022-05-14

## 2022-04-13 NOTE — PROGRESS NOTES
FOLLOW UP NOTE:     CHIEF COMPLAINT: left knee pain, lower back pain      Interval history:  Richard Bustos is a 76 y.o. male with chronic left knee pain who presents today for f/u.He is s/p bilateral L3,4 , 5 MB RFA 5 months ago with 50% relief.  Left knee injection provided moderate benefit for about 1 month. Currently c/o bilateral knee pain. Reports he feels as if knees give out but denies falling.  Denies swelling or erythema.    He takes Norco 5 mg very q.8 hours as needed with moderate benefit.  Denies side effects with medication.  Denies any worsening weakness.  No bowel bladder changes.  Pain today is rated 6/10.    Prior HPI: Richard Bustos is a 76 y.o. male with PMH significant for CAD s/p PCI (ASA and Plavix), atrial fibrllation on coumadin, hx of bilateral hip replacements, CKD, HTN, and DORA presents as an established patient for the continued management of back and right hip pain. Today, the patient is not complaining primarily of back pain. The patient is reporting of right hip pain with associated groin pain today. The patient reports that this began approximately 1 month ago. The patient reports that he has a prior history of a hematoma that he is concerned about given his relatively new onset right hip pain. He was seen by orthopedics, Dr. Ríos and underwent a CT guided drainage.  Hip pain is much improved.  The patient denies of any significant changes in his health since his last appointment. The patient also denies of any changes in the character of his pain since his last appointment. The patient reports of benefit on his current pain regimen. Patient denies of any urinary/fecal incontinence, saddle anesthesia, or weakness.     INTERVENTIONAL PAIN HISTORY:  11/23/21: B/L L3, 4, 5 MB RFA 50% relief  10/19/2020, 5/2021: Left knee intra-articular steroid injection   9/21/2020: Lumbar interlaminar epidural steroid injection at L5-S1  6/22/2020: L5-S1 lumbar interlaminar epidural steroid  "injection - at least 50% relief  2/17/2020: Left knee intra-articular knee injection - good relief  1/10/2020: Radiofrequency ablation of the L3, L4, L5 medial branch nerves on the bilateral-side - 50% relief  8/3/2017: RFA at L3, 4, 5 - reports 80% relief    CURRENT PAIN MEDICATIONS:   Norco 5-325 mg PO BID/TID     Tried in the past:   Tramadol 50 mg PO BID for pain - mild benefit     ROS:  Review of Systems   Constitutional: Negative for chills and fever.   HENT: Negative for tinnitus.    Eyes: Negative for visual disturbance.   Respiratory: Negative for shortness of breath.    Cardiovascular: Negative for chest pain.   Gastrointestinal: Negative for nausea and vomiting.   Genitourinary: Negative for difficulty urinating.   Musculoskeletal: Positive for arthralgias and back pain.   Skin: Negative for rash.   Allergic/Immunologic: Negative for immunocompromised state.   Neurological: Negative for syncope.   Hematological: Does not bruise/bleed easily.   Psychiatric/Behavioral: Negative for suicidal ideas.        MEDICAL, SURGICAL, FAMILY, SOCIAL HX: reviewed    MEDICATIONS/ALLERGIES: reviewed    PHYSICAL EXAM:    VITALS: Vitals reviewed.   Vitals:    04/13/22 1005   Weight: 121.6 kg (268 lb)   Height: 5' 6" (1.676 m)   PainSc:   6   PainLoc: Back       Physical Exam  Vitals and nursing note reviewed.   Constitutional:       Appearance: He is not diaphoretic.   HENT:      Head: Normocephalic and atraumatic.   Eyes:      General:         Right eye: No discharge.         Left eye: No discharge.      Conjunctiva/sclera: Conjunctivae normal.   Cardiovascular:      Rate and Rhythm: RRR  Pulmonary:      Effort: Pulmonary effort is normal. No respiratory distress.      Breath sounds: Normal breath sounds.   Abdominal:      Palpations: Abdomen is soft.   Musculoskeletal:      Lumbar back: Tenderness present. Negative right straight leg raise test and negative left straight leg raise test.   Skin:     General: Skin is warm " and dry.      Findings: No rash.   Neurological:      Mental Status: He is alert and oriented to person, place, and time.   Psychiatric:         Mood and Affect: Mood and affect normal.         Cognition and Memory: Memory normal.         Judgment: Judgment normal.          UPPER EXTREMITIES: Normal alignment, normal range of motion, no atrophy, no skin changes,  hair growth and nail growth normal and equal bilaterally. No swelling, no tenderness.    LOWER EXTREMITIES:  Normal alignment, normal range of motion, no atrophy, no skin changes,  hair growth and nail growth normal and equal bilaterally. No swelling, no tenderness.     LUMBAR SPINE  Lumbar spine: ROM is significantly limited with flexion extension and oblique extension with increased pain.    Supine straight leg raise: unable to perform secondary to fall risk   ((+)) Facet loading bilaterally  Internal and external rotation of the hips: unable to perform secondary to fall risk  Myofascial exam: No tenderness to palpation across lumbar paraspinous muscles.     MOTOR: Tone and bulk: normal bilateral upper and lower Strength: normal   Delt      Bi         Tri        WE      WF                        R          5          5          5          5          5          5            L          5          5          5          5          5          5               IP         ADD     ABD     Quad   TA        Gas      HAM  R          5          5          5          5          5          5          5  L          5          5          5          5          5          5          5     SENSATION: Light touch and pinprick intact bilaterally  REFLEXES: normal, symmetric, nonbrisk.  Toes down, no clonus. Negative roca's sign bilaterally.  GAIT: normal rise, base, steps, and arm swing.      IMAGING: no new imaging to review    ASSESSMENT: Richard Bustos is a 75 y.o. male with PMH significant for CAD s/p PCI (ASA and Plavix), atrial fibrllation on coumadin, hx of  bilateral hip replacements, CKD, HTN, and DORA presents as an established patient for the continued management of back and right hip pain. No recent imaging of the right hip to review today. Treatment plan outlined below.   1. Chronic pain of both knees    2. DDD (degenerative disc disease), lumbar    3. Other spondylosis, lumbar region    4. Central stenosis of spinal canal        PLAN:  1. I have stressed the importance of physical activity and exercise to improve overall health  2. Reviewed pertinent imaging and records with patient  3. Xray ordered today to evaluate bilateral knee pain.  Consider repeat left knee injection.   4. Patient with significant benefit following Lumbar STARLA.  Patient will continue to monitor his progress.  May consider repeat procedure in future if pain returns or worsens.   5. Monitor progress from lumbar MB RFA at L3, 4, 5 and repeat in >2 months if indicated  6. He can continue Norco 5mg q8hrs as needed.   reviewed.  Previous UDS consistent  7. Follow up in 3 months  Medication management per Dr. Causey

## 2022-04-26 ENCOUNTER — OFFICE VISIT (OUTPATIENT)
Dept: UROLOGY | Facility: CLINIC | Age: 77
End: 2022-04-26
Payer: MEDICARE

## 2022-04-26 VITALS
HEIGHT: 69 IN | BODY MASS INDEX: 39.7 KG/M2 | SYSTOLIC BLOOD PRESSURE: 138 MMHG | WEIGHT: 268.06 LBS | DIASTOLIC BLOOD PRESSURE: 73 MMHG | HEART RATE: 55 BPM

## 2022-04-26 DIAGNOSIS — C61 PROSTATE CANCER: ICD-10-CM

## 2022-04-26 DIAGNOSIS — N32.81 OAB (OVERACTIVE BLADDER): Primary | ICD-10-CM

## 2022-04-26 LAB
BILIRUB SERPL-MCNC: ABNORMAL MG/DL
BLOOD URINE, POC: ABNORMAL
CLARITY, POC UA: CLEAR
COLOR, POC UA: YELLOW
GLUCOSE UR QL STRIP: ABNORMAL
KETONES UR QL STRIP: ABNORMAL
LEUKOCYTE ESTERASE URINE, POC: ABNORMAL
NITRITE, POC UA: ABNORMAL
PH, POC UA: 5
PROTEIN, POC: 30
SPECIFIC GRAVITY, POC UA: 1.02
UROBILINOGEN, POC UA: 0.2

## 2022-04-26 PROCEDURE — 99999 PR PBB SHADOW E&M-EST. PATIENT-LVL V: ICD-10-PCS | Mod: PBBFAC,,, | Performed by: UROLOGY

## 2022-04-26 PROCEDURE — 99213 OFFICE O/P EST LOW 20 MIN: CPT | Mod: S$GLB,,, | Performed by: UROLOGY

## 2022-04-26 PROCEDURE — 99213 PR OFFICE/OUTPT VISIT, EST, LEVL III, 20-29 MIN: ICD-10-PCS | Mod: S$GLB,,, | Performed by: UROLOGY

## 2022-04-26 PROCEDURE — 81002 POCT URINE DIPSTICK WITHOUT MICROSCOPE: ICD-10-PCS | Mod: S$GLB,,, | Performed by: UROLOGY

## 2022-04-26 PROCEDURE — 3078F DIAST BP <80 MM HG: CPT | Mod: CPTII,S$GLB,, | Performed by: UROLOGY

## 2022-04-26 PROCEDURE — 3288F FALL RISK ASSESSMENT DOCD: CPT | Mod: CPTII,S$GLB,, | Performed by: UROLOGY

## 2022-04-26 PROCEDURE — 1160F PR REVIEW ALL MEDS BY PRESCRIBER/CLIN PHARMACIST DOCUMENTED: ICD-10-PCS | Mod: CPTII,S$GLB,, | Performed by: UROLOGY

## 2022-04-26 PROCEDURE — 1101F PR PT FALLS ASSESS DOC 0-1 FALLS W/OUT INJ PAST YR: ICD-10-PCS | Mod: CPTII,S$GLB,, | Performed by: UROLOGY

## 2022-04-26 PROCEDURE — 3008F BODY MASS INDEX DOCD: CPT | Mod: CPTII,S$GLB,, | Performed by: UROLOGY

## 2022-04-26 PROCEDURE — 81002 URINALYSIS NONAUTO W/O SCOPE: CPT | Mod: S$GLB,,, | Performed by: UROLOGY

## 2022-04-26 PROCEDURE — 3008F PR BODY MASS INDEX (BMI) DOCUMENTED: ICD-10-PCS | Mod: CPTII,S$GLB,, | Performed by: UROLOGY

## 2022-04-26 PROCEDURE — 4010F PR ACE/ARB THEARPY RXD/TAKEN: ICD-10-PCS | Mod: CPTII,S$GLB,, | Performed by: UROLOGY

## 2022-04-26 PROCEDURE — 99999 PR PBB SHADOW E&M-EST. PATIENT-LVL V: CPT | Mod: PBBFAC,,, | Performed by: UROLOGY

## 2022-04-26 PROCEDURE — 3078F PR MOST RECENT DIASTOLIC BLOOD PRESSURE < 80 MM HG: ICD-10-PCS | Mod: CPTII,S$GLB,, | Performed by: UROLOGY

## 2022-04-26 PROCEDURE — 3075F SYST BP GE 130 - 139MM HG: CPT | Mod: CPTII,S$GLB,, | Performed by: UROLOGY

## 2022-04-26 PROCEDURE — 3075F PR MOST RECENT SYSTOLIC BLOOD PRESS GE 130-139MM HG: ICD-10-PCS | Mod: CPTII,S$GLB,, | Performed by: UROLOGY

## 2022-04-26 PROCEDURE — 1101F PT FALLS ASSESS-DOCD LE1/YR: CPT | Mod: CPTII,S$GLB,, | Performed by: UROLOGY

## 2022-04-26 PROCEDURE — 1160F RVW MEDS BY RX/DR IN RCRD: CPT | Mod: CPTII,S$GLB,, | Performed by: UROLOGY

## 2022-04-26 PROCEDURE — 3044F HG A1C LEVEL LT 7.0%: CPT | Mod: CPTII,S$GLB,, | Performed by: UROLOGY

## 2022-04-26 PROCEDURE — 3044F PR MOST RECENT HEMOGLOBIN A1C LEVEL <7.0%: ICD-10-PCS | Mod: CPTII,S$GLB,, | Performed by: UROLOGY

## 2022-04-26 PROCEDURE — 1159F MED LIST DOCD IN RCRD: CPT | Mod: CPTII,S$GLB,, | Performed by: UROLOGY

## 2022-04-26 PROCEDURE — 3288F PR FALLS RISK ASSESSMENT DOCUMENTED: ICD-10-PCS | Mod: CPTII,S$GLB,, | Performed by: UROLOGY

## 2022-04-26 PROCEDURE — 1159F PR MEDICATION LIST DOCUMENTED IN MEDICAL RECORD: ICD-10-PCS | Mod: CPTII,S$GLB,, | Performed by: UROLOGY

## 2022-04-26 PROCEDURE — 1125F AMNT PAIN NOTED PAIN PRSNT: CPT | Mod: CPTII,S$GLB,, | Performed by: UROLOGY

## 2022-04-26 PROCEDURE — 1125F PR PAIN SEVERITY QUANTIFIED, PAIN PRESENT: ICD-10-PCS | Mod: CPTII,S$GLB,, | Performed by: UROLOGY

## 2022-04-26 PROCEDURE — 4010F ACE/ARB THERAPY RXD/TAKEN: CPT | Mod: CPTII,S$GLB,, | Performed by: UROLOGY

## 2022-04-26 RX ORDER — MIRABEGRON 50 MG/1
50 TABLET, FILM COATED, EXTENDED RELEASE ORAL DAILY
Qty: 90 TABLET | Refills: 3 | Status: SHIPPED | OUTPATIENT
Start: 2022-04-26 | End: 2022-05-30 | Stop reason: SDUPTHER

## 2022-04-26 NOTE — PATIENT INSTRUCTIONS
Gl 8 prostate cancer s/p XRT in 2016 + ADT x 2.5 years  psa stable 0.03     oab managed with myrbetriq    RLP complex kidney cyst unchanged since June 2020     1. OAB (overactive bladder)    2. Prostate cancer          Plan:     Continue myrbetriq 50mg daily- refilled for a year.    rbus in 2 years (jan 2024)  to monitor RLP cyst since unchanged in 2 years.   psa diagnostic in 6month then change to yearly if th esame    F/u in 6 months with diagnostic psa prior (standing orders until 2024)

## 2022-04-26 NOTE — PROGRESS NOTES
[facility] - [place]  Staff: MD Isa  PCP: Familia Ramirez Jr, MD  Date of Service: 04/26/2022      Subjective:        HPI: Richard Bustos is a 76 y.o. male     Interval history 1/21/21:  He has a h/o UUI but was only voiding 4x a day. Was having 1 leak a day if he held it too long. Had put him on myrbetriq 50mg which helped some? Today he states wears thin pad (1 a day) with occ leaks unchanged and voids about 4x a day with rare urgency if he holds too long again.     He has a h/o Gl 4 prostate cancer sp XRT completed in 2016 and ADT x 2.5 years. Most recent psa <0.1.    Also has a h/o complex right renal cyst. Last rbus 1/14/2021 shows  There are a couple of simple renal cyst present with the largest measuring 5.6 x 4.4 x 4.4 cm.  Within the lower pole, there appears to be a complex hypoechoic cyst measuring 2.8 x 3.4 x 4.7 cm in size, not significantly changed when compared to the prior study when this measured 4.2 x 4.1 x 4.5 cm.  No renal stone. No hydronephrosis. Left side previously showed stone but this one did not.     I reviewed his previous urinalysis and cultures as well as his urinalysis today. His urinalysis today shows no abnormalities except 100 protein.     Interval history 10/14/21:  · psa was 0.01 on 1/28/21  · 3/24/21 restart mybetriq 50mg once daily again. Had stopped bc unsure if it was helping but appears it was helping since daughter states leakage and frequency worsened off of it.   · 4/26/21 saw marin and pvr: 0. Still having reanna urgency but went over some basic recs.   · psa 0.02 on 7/29, then 8/18 then 10/7 (not sure it was checked so manytimes)    Interval history 1/25/22:   On myrbetriq 50mg daily no longer leaking urine. No urine frequency.    rbus 1/14/21 for RLP complex cyst: complex hypoechoic cyst measuring 2.8 x 3.4 x 4.7 cm in size, not significantly changed since June 2020 when compared to the prior study when this measured 4.2 x 4.1 x  4.5 cm. b simple renal cysts.    psa 1/4/22 0.03 (rising)   Just released from hospital for sepsis secondary to strep (ua was neg)     Interval history 4/26/22:  · 4/13/22  psa 0.03 psa the same.   · No complaints right now. Doing well with no incontinence.   · Void: neg    Current REVIEW OF SYSTEMS:  He says having R hip drained next week.     PSA History: no fam hx of prostate cancer   4/13/22 0.03  1/25/22 Pvr: 197 but voided 2 hours ago  1/4/22  0.03  Component PSA Diagnostic   Latest Ref Rng & Units 0.00 - 4.00 ng/mL   10/7/2021 0.02   8/18/2021 0.02   7/29/2021 0.02   1/28/2021 0.01   1/14/2021 <0.10   6/4/2020 <0.10   12/2/2019 <0.10   5/10/2019 <0.10   4/24/2019 <0.01   4/23/2019 <0.10   1/15/2019 <0.01   10/16/2018 <0.01   7/5/2018 <0.01   1/4/2018 <0.01   11/21/2017 6/12/2017 <0.01   12/12/2016  Gl 4 prostate cancer sp XRT completed in 2016 and ADT x 2.5 years <0.01     8/1/2016 3.3   3/18/2016 7.7 (H)     Urine history:   1/6/22 1+prot    Objective:     Vitals:    04/26/22 1320   BP: 138/73   Pulse: (!) 55         Assessment:     Richard Bustos is a 76 y.o. male with     Gl 8 prostate cancer s/p XRT in 2016 + ADT x 2.5 years  psa stable 0.03     oab managed with myrbetriq    RLP complex kidney cyst unchanged since June 2020     1. OAB (overactive bladder)    2. Prostate cancer          Plan:      Continue myrbetriq 50mg daily- refilled for a year.     rbus in 2 years (jan 2024)  to monitor RLP cyst since unchanged in 2 years.    psa diagnostic in 6month then change to yearly if th esame    F/u in 6 months with diagnostic psa prior (standing orders until 2024)      Adeline Moss MD

## 2022-05-03 ENCOUNTER — OFFICE VISIT (OUTPATIENT)
Dept: DERMATOLOGY | Facility: CLINIC | Age: 77
End: 2022-05-03
Payer: MEDICARE

## 2022-05-03 VITALS — HEIGHT: 69 IN | BODY MASS INDEX: 39.69 KG/M2 | WEIGHT: 268 LBS

## 2022-05-03 DIAGNOSIS — L57.0 ACTINIC KERATOSES: Primary | ICD-10-CM

## 2022-05-03 DIAGNOSIS — D18.01 CHERRY ANGIOMA: ICD-10-CM

## 2022-05-03 DIAGNOSIS — I87.2 VENOUS STASIS DERMATITIS OF BOTH LOWER EXTREMITIES: ICD-10-CM

## 2022-05-03 DIAGNOSIS — L82.1 SEBORRHEIC KERATOSES: ICD-10-CM

## 2022-05-03 DIAGNOSIS — L81.4 SOLAR LENTIGO: ICD-10-CM

## 2022-05-03 DIAGNOSIS — I48.0 PAROXYSMAL ATRIAL FIBRILLATION: ICD-10-CM

## 2022-05-03 DIAGNOSIS — Z15.89 MTHFR MUTATION (METHYLENETETRAHYDROFOLATE REDUCTASE): ICD-10-CM

## 2022-05-03 DIAGNOSIS — L98.9 SKIN LESION OF FACE: ICD-10-CM

## 2022-05-03 PROCEDURE — 99203 OFFICE O/P NEW LOW 30 MIN: CPT | Mod: 25,S$GLB,, | Performed by: DERMATOLOGY

## 2022-05-03 PROCEDURE — 4010F PR ACE/ARB THEARPY RXD/TAKEN: ICD-10-PCS | Mod: CPTII,S$GLB,, | Performed by: DERMATOLOGY

## 2022-05-03 PROCEDURE — 3288F PR FALLS RISK ASSESSMENT DOCUMENTED: ICD-10-PCS | Mod: CPTII,S$GLB,, | Performed by: DERMATOLOGY

## 2022-05-03 PROCEDURE — 1160F PR REVIEW ALL MEDS BY PRESCRIBER/CLIN PHARMACIST DOCUMENTED: ICD-10-PCS | Mod: CPTII,S$GLB,, | Performed by: DERMATOLOGY

## 2022-05-03 PROCEDURE — 1160F RVW MEDS BY RX/DR IN RCRD: CPT | Mod: CPTII,S$GLB,, | Performed by: DERMATOLOGY

## 2022-05-03 PROCEDURE — 3008F BODY MASS INDEX DOCD: CPT | Mod: CPTII,S$GLB,, | Performed by: DERMATOLOGY

## 2022-05-03 PROCEDURE — 99999 PR PBB SHADOW E&M-EST. PATIENT-LVL III: ICD-10-PCS | Mod: PBBFAC,,, | Performed by: DERMATOLOGY

## 2022-05-03 PROCEDURE — 99999 PR PBB SHADOW E&M-EST. PATIENT-LVL III: CPT | Mod: PBBFAC,,, | Performed by: DERMATOLOGY

## 2022-05-03 PROCEDURE — 1159F MED LIST DOCD IN RCRD: CPT | Mod: CPTII,S$GLB,, | Performed by: DERMATOLOGY

## 2022-05-03 PROCEDURE — 1159F PR MEDICATION LIST DOCUMENTED IN MEDICAL RECORD: ICD-10-PCS | Mod: CPTII,S$GLB,, | Performed by: DERMATOLOGY

## 2022-05-03 PROCEDURE — 3044F PR MOST RECENT HEMOGLOBIN A1C LEVEL <7.0%: ICD-10-PCS | Mod: CPTII,S$GLB,, | Performed by: DERMATOLOGY

## 2022-05-03 PROCEDURE — 3288F FALL RISK ASSESSMENT DOCD: CPT | Mod: CPTII,S$GLB,, | Performed by: DERMATOLOGY

## 2022-05-03 PROCEDURE — 4010F ACE/ARB THERAPY RXD/TAKEN: CPT | Mod: CPTII,S$GLB,, | Performed by: DERMATOLOGY

## 2022-05-03 PROCEDURE — 1101F PR PT FALLS ASSESS DOC 0-1 FALLS W/OUT INJ PAST YR: ICD-10-PCS | Mod: CPTII,S$GLB,, | Performed by: DERMATOLOGY

## 2022-05-03 PROCEDURE — 3008F PR BODY MASS INDEX (BMI) DOCUMENTED: ICD-10-PCS | Mod: CPTII,S$GLB,, | Performed by: DERMATOLOGY

## 2022-05-03 PROCEDURE — 17003 DESTRUCT PREMALG LES 2-14: CPT | Mod: S$GLB,,, | Performed by: DERMATOLOGY

## 2022-05-03 PROCEDURE — 17000 PR DESTRUCTION(LASER SURGERY,CRYOSURGERY,CHEMOSURGERY),PREMALIGNANT LESIONS,FIRST LESION: ICD-10-PCS | Mod: S$GLB,,, | Performed by: DERMATOLOGY

## 2022-05-03 PROCEDURE — 1126F PR PAIN SEVERITY QUANTIFIED, NO PAIN PRESENT: ICD-10-PCS | Mod: CPTII,S$GLB,, | Performed by: DERMATOLOGY

## 2022-05-03 PROCEDURE — 99203 PR OFFICE/OUTPT VISIT, NEW, LEVL III, 30-44 MIN: ICD-10-PCS | Mod: 25,S$GLB,, | Performed by: DERMATOLOGY

## 2022-05-03 PROCEDURE — 1126F AMNT PAIN NOTED NONE PRSNT: CPT | Mod: CPTII,S$GLB,, | Performed by: DERMATOLOGY

## 2022-05-03 PROCEDURE — 1101F PT FALLS ASSESS-DOCD LE1/YR: CPT | Mod: CPTII,S$GLB,, | Performed by: DERMATOLOGY

## 2022-05-03 PROCEDURE — 17000 DESTRUCT PREMALG LESION: CPT | Mod: S$GLB,,, | Performed by: DERMATOLOGY

## 2022-05-03 PROCEDURE — 3044F HG A1C LEVEL LT 7.0%: CPT | Mod: CPTII,S$GLB,, | Performed by: DERMATOLOGY

## 2022-05-03 PROCEDURE — 17003 DESTRUCTION, PREMALIGNANT LESIONS; SECOND THROUGH 14 LESIONS: ICD-10-PCS | Mod: S$GLB,,, | Performed by: DERMATOLOGY

## 2022-05-03 RX ORDER — WARFARIN SODIUM 5 MG/1
TABLET ORAL
Qty: 90 TABLET | Refills: 3 | Status: SHIPPED | OUTPATIENT
Start: 2022-05-03 | End: 2023-01-25 | Stop reason: SDUPTHER

## 2022-05-03 RX ORDER — TRIAMCINOLONE ACETONIDE 1 MG/G
CREAM TOPICAL
Qty: 80 G | Refills: 3 | Status: SHIPPED | OUTPATIENT
Start: 2022-05-03 | End: 2022-08-18

## 2022-05-03 NOTE — PATIENT INSTRUCTIONS

## 2022-05-03 NOTE — PROGRESS NOTES
Subjective:       Patient ID:  Richard Bustos is a 76 y.o. male who presents for   Chief Complaint   Patient presents with    Skin Check     ubsc    Spot     Spots on forehead and arms     LOV-9/28/18-ak, sk, lentigo    Here today for an UBSC  Has spots/sore on his forehead x 4 months  Spots on BUE    H/o SCCIS left helix, treated with efudex daily x 4 weeks 01/2018  Has no fhx of MM      Current Outpatient Medications:     albuterol (VENTOLIN HFA) 90 mcg/actuation inhaler, Inhale 2 puffs into the lungs every 6 (six) hours as needed for Wheezing or Shortness of Breath. Rescue, Disp: 1 g, Rfl: 2    allopurinoL (ZYLOPRIM) 100 MG tablet, Take 1 tablet (100 mg total) by mouth every evening., Disp: 90 tablet, Rfl: 3    ammonium lactate 12 % Crea, Apply twice daily to affected parts both feet as needed., Disp: 140 g, Rfl: 11    aspirin (ECOTRIN) 81 MG EC tablet, Take 81 mg by mouth once daily., Disp: , Rfl:     atorvastatin (LIPITOR) 80 MG tablet, Take 1 tablet (80 mg total) by mouth once daily., Disp: 90 tablet, Rfl: 3    calcitRIOL (ROCALTROL) 0.5 MCG Cap, 0.75 mcg once daily. , Disp: , Rfl: 4    carvediloL (COREG) 25 MG tablet, Take 1 tablet (25 mg total) by mouth 2 (two) times daily with meals., Disp: 180 tablet, Rfl: 3    clopidogreL (PLAVIX) 75 mg tablet, Take 1 tablet (75 mg total) by mouth once daily., Disp: 90 tablet, Rfl: 3    famotidine (PEPCID) 40 MG tablet, Take 1 tablet (40 mg total) by mouth once daily., Disp: 90 tablet, Rfl: 3    ferrous sulfate (FEROSUL) 325 mg (65 mg iron) Tab tablet, TAKE 1 TABLET BY MOUTH TWICE DAILY, Disp: 180 tablet, Rfl: 3    fluticasone propionate (FLONASE) 50 mcg/actuation nasal spray, SHAKE LIQUID AND USE 2 SPRAYS(100 MCG) IN EACH NOSTRIL EVERY DAY, Disp: 16 g, Rfl: 5    FOLIC ACID/MULTIVIT-MIN/LUTEIN (CENTRUM SILVER ORAL), Take 1 tablet by mouth once daily., Disp: , Rfl:     HYDROcodone-acetaminophen (NORCO) 5-325 mg per tablet, Take 1 tablet by mouth every 8  (eight) hours as needed for Pain. Medically necessary for greater than 7 days for chronic pain, Disp: 90 tablet, Rfl: 0    hydrocortisone 2.5 % cream, Apply topically 2 (two) times daily., Disp: 1 Tube, Rfl: 0    ipratropium (ATROVENT) 42 mcg (0.06 %) nasal spray, 42 sprays by Nasal route., Disp: , Rfl:     LIDOcaine HCL 2% (XYLOCAINE) 2 % jelly, Apply topically as needed. Apply topically once nightly to affected part of foot/feet., Disp: 30 mL, Rfl: 2    lisinopriL (PRINIVIL,ZESTRIL) 40 MG tablet, Take 1 tablet (40 mg total) by mouth every evening., Disp: 90 tablet, Rfl: 3    magnesium oxide (MAG-OX) 400 mg (241.3 mg magnesium) tablet, Take 1 tablet (400 mg total) by mouth once daily., Disp: 90 tablet, Rfl: 3    mecobal-levomefolat Ca-B6 phos (L-METHYL-B6-B12) 3-35-2 mg Tab, Take 1 tablet by mouth 2 (two) times daily., Disp: 180 tablet, Rfl: 3    mirabegron (MYRBETRIQ) 50 mg Tb24, Take 1 tablet (50 mg total) by mouth once daily., Disp: 90 tablet, Rfl: 3    nystatin (MYCOSTATIN) powder, Apply topically 2 (two) times daily., Disp: 60 g, Rfl: 11    omeprazole (PRILOSEC) 20 MG capsule, Take 1 capsule (20 mg total) by mouth once daily., Disp: 90 capsule, Rfl: 3    traZODone (DESYREL) 50 MG tablet, Take 1 tablet (50 mg total) by mouth every evening., Disp: 90 tablet, Rfl: 3    vit A/vit C/vit E/zinc/copper (PRESERVISION AREDS ORAL), Take by mouth 2 (two) times a day. , Disp: , Rfl:     warfarin (COUMADIN) 5 MG tablet, 5 mg except on wednesday and saturday Wed and sat 2.5 mgs, Disp: 90 tablet, Rfl: 3    ciclopirox (LOPROX) 0.77 % Crea, Apply topically 2 (two) times daily. (Patient not taking: No sig reported), Disp: 90 g, Rfl: 2    ciclopirox (PENLAC) 8 % Soln, Apply topically nightly. (Patient not taking: No sig reported), Disp: 6.6 mL, Rfl: 11    (START ON 5/14/2022) HYDROcodone-acetaminophen (NORCO) 5-325 mg per tablet, Take 1 tablet by mouth every 8 (eight) hours as needed for Pain. Medically necessary  for greater than 7 days for chronic pain (Patient not taking: No sig reported), Disp: 90 tablet, Rfl: 0    (START ON 6/12/2022) HYDROcodone-acetaminophen (NORCO) 5-325 mg per tablet, Take 1 tablet by mouth every 8 (eight) hours as needed for Pain. Medically necessary for greater than 7 days for chronic pain (Patient not taking: No sig reported), Disp: 90 tablet, Rfl: 0    nitroGLYCERIN 0.4 MG/DOSE TL SPRY (NITROLINGUAL) 400 mcg/spray spray, PLACE 1 SPRAY UNDER THE TONGUE EVERY 5 MINUTES AS NEEDED FOR CHEST PAIN (Patient not taking: No sig reported), Disp: 4.9 g, Rfl: 9    prothrombin time/INR test metr Misc, 1 Stick by Misc.(Non-Drug; Combo Route) route every 30 days. (Patient not taking: No sig reported), Disp: 1 each, Rfl: 0        Review of Systems   Constitutional: Negative for fever, chills and fatigue.   Respiratory: Negative for cough and shortness of breath.    Skin: Positive for dry skin and wears hat. Negative for itching, rash, activity-related sunscreen use and sensitivity to antibiotic ointment.   Hematologic/Lymphatic: Bruises/bleeds easily.        Objective:    Physical Exam   Constitutional: He appears well-developed and well-nourished. No distress.   Neurological: He is alert and oriented to person, place, and time. He is not disoriented.   Psychiatric: He has a normal mood and affect.   Skin:   Areas Examined (abnormalities noted in diagram):   Scalp / Hair Palpated and Inspected  Head / Face Inspection Performed  Neck Inspection Performed  Chest / Axilla Inspection Performed  Abdomen Inspection Performed  Back Inspection Performed  RUE Inspected  LUE Inspection Performed  Nails and Digits Inspection Performed                   Diagram Legend     Erythematous scaling macule/papule c/w actinic keratosis       Vascular papule c/w angioma      Pigmented verrucoid papule/plaque c/w seborrheic keratosis      Yellow umbilicated papule c/w sebaceous hyperplasia      Irregularly shaped tan macule c/w  lentigo     1-2 mm smooth white papules consistent with Milia      Movable subcutaneous cyst with punctum c/w epidermal inclusion cyst      Subcutaneous movable cyst c/w pilar cyst      Firm pink to brown papule c/w dermatofibroma      Pedunculated fleshy papule(s) c/w skin tag(s)      Evenly pigmented macule c/w junctional nevus     Mildly variegated pigmented, slightly irregular-bordered macule c/w mildly atypical nevus      Flesh colored to evenly pigmented papule c/w intradermal nevus       Pink pearly papule/plaque c/w basal cell carcinoma      Erythematous hyperkeratotic cursted plaque c/w SCC      Surgical scar with no sign of skin cancer recurrence      Open and closed comedones      Inflammatory papules and pustules      Verrucoid papule consistent consistent with wart     Erythematous eczematous patches and plaques     Dystrophic onycholytic nail with subungual debris c/w onychomycosis     Umbilicated papule    Erythematous-base heme-crusted tan verrucoid plaque consistent with inflamed seborrheic keratosis     Erythematous Silvery Scaling Plaque c/w Psoriasis     See annotation      Assessment / Plan:        Actinic keratoses  Cryosurgery Procedure Note    Verbal consent from the patient is obtained and the patient is aware of the precancerous quality and need for treatment of these lesions. Liquid nitrogen cryosurgery is applied to the 4 actinic keratoses, as detailed in the physical exam, to produce a freeze injury. The patient is aware that blisters may form and is instructed on wound care with gentle cleansing and use of vaseline ointment to keep moist until healed. The patient is supplied a handout on cryosurgery and is instructed to call if lesions do not completely resolve.    Skin lesion of face  -     Ambulatory referral/consult to Dermatology    Seborrheic keratoses  These are benign inherited growths without a malignant potential. Reassurance given to patient. No treatment is necessary.      Solar lentigo  This is a benign hyperpigmented sun induced lesion. Daily sun protection will reduce the number of new lesions. Treatment of these benign lesions are considered cosmetic.    Cherry angioma  This is a benign vascular lesion. Reassurance given. No treatment required.     Venous stasis dermatitis of both lower extremities  -     triamcinolone acetonide 0.1% (KENALOG) 0.1 % cream; AAA bid  Dispense: 80 g; Refill: 3    Patient instructed in importance in daily broad spectrum sun protection of at least spf 30. Mineral sunscreen ingredients preferred (Zinc +/- Titanium) and can be found OTC.   Recommend Elta MD for daily use on face and neck.  Patient encouraged to wear hat for all outdoor exposure.   Also discussed sun avoidance and use of protective clothing.         Follow up in about 1 year (around 5/3/2023), or if symptoms worsen or fail to improve.

## 2022-05-26 ENCOUNTER — TELEPHONE (OUTPATIENT)
Dept: PODIATRY | Facility: CLINIC | Age: 77
End: 2022-05-26
Payer: MEDICARE

## 2022-05-26 NOTE — TELEPHONE ENCOUNTER
Could not call Roxana ([facility]), number have to many digits.          ----- Message from Akosua Sarabia sent at 5/26/2022 12:58 PM CDT -----  Regarding: advice  Contact: [facility]/Roxana/[phone]  Type: Needs Medical Advice  Who Called:  [facility]/Roxana/[phone]    Additional Information: The office prescribed shoes for the patient through them and they need the office notes from 3/31/22. Please fax to [phone]. Thanks.

## 2022-05-31 ENCOUNTER — PATIENT MESSAGE (OUTPATIENT)
Dept: ADMINISTRATIVE | Facility: HOSPITAL | Age: 77
End: 2022-05-31
Payer: MEDICARE

## 2022-06-20 ENCOUNTER — OFFICE VISIT (OUTPATIENT)
Dept: FAMILY MEDICINE | Facility: CLINIC | Age: 77
End: 2022-06-20
Payer: MEDICARE

## 2022-06-20 VITALS
SYSTOLIC BLOOD PRESSURE: 134 MMHG | OXYGEN SATURATION: 95 % | HEART RATE: 57 BPM | DIASTOLIC BLOOD PRESSURE: 86 MMHG | WEIGHT: 269.38 LBS | BODY MASS INDEX: 39.9 KG/M2 | TEMPERATURE: 98 F | HEIGHT: 69 IN
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
## 2022-06-20 DIAGNOSIS — R53.1 GENERAL WEAKNESS: ICD-10-CM

## 2022-06-20 DIAGNOSIS — T14.8XXA SKIN AVULSION: Primary | ICD-10-CM

## 2022-06-20 PROCEDURE — 1125F AMNT PAIN NOTED PAIN PRSNT: CPT | Mod: CPTII,S$GLB,, | Performed by: PHYSICIAN ASSISTANT

## 2022-06-20 PROCEDURE — 99999 PR PBB SHADOW E&M-EST. PATIENT-LVL III: ICD-10-PCS | Mod: PBBFAC,,, | Performed by: PHYSICIAN ASSISTANT

## 2022-06-20 PROCEDURE — 1159F MED LIST DOCD IN RCRD: CPT | Mod: CPTII,S$GLB,, | Performed by: PHYSICIAN ASSISTANT

## 2022-06-20 PROCEDURE — 3044F HG A1C LEVEL LT 7.0%: CPT | Mod: CPTII,S$GLB,, | Performed by: PHYSICIAN ASSISTANT

## 2022-06-20 PROCEDURE — 3008F PR BODY MASS INDEX (BMI) DOCUMENTED: ICD-10-PCS | Mod: CPTII,S$GLB,, | Performed by: PHYSICIAN ASSISTANT

## 2022-06-20 PROCEDURE — 3079F DIAST BP 80-89 MM HG: CPT | Mod: CPTII,S$GLB,, | Performed by: PHYSICIAN ASSISTANT

## 2022-06-20 PROCEDURE — 3075F SYST BP GE 130 - 139MM HG: CPT | Mod: CPTII,S$GLB,, | Performed by: PHYSICIAN ASSISTANT

## 2022-06-20 PROCEDURE — 3288F PR FALLS RISK ASSESSMENT DOCUMENTED: ICD-10-PCS | Mod: CPTII,S$GLB,, | Performed by: PHYSICIAN ASSISTANT

## 2022-06-20 PROCEDURE — 3044F PR MOST RECENT HEMOGLOBIN A1C LEVEL <7.0%: ICD-10-PCS | Mod: CPTII,S$GLB,, | Performed by: PHYSICIAN ASSISTANT

## 2022-06-20 PROCEDURE — 4010F ACE/ARB THERAPY RXD/TAKEN: CPT | Mod: CPTII,S$GLB,, | Performed by: PHYSICIAN ASSISTANT

## 2022-06-20 PROCEDURE — 99213 PR OFFICE/OUTPT VISIT, EST, LEVL III, 20-29 MIN: ICD-10-PCS | Mod: 25,S$GLB,, | Performed by: PHYSICIAN ASSISTANT

## 2022-06-20 PROCEDURE — 3288F FALL RISK ASSESSMENT DOCD: CPT | Mod: CPTII,S$GLB,, | Performed by: PHYSICIAN ASSISTANT

## 2022-06-20 PROCEDURE — 3079F PR MOST RECENT DIASTOLIC BLOOD PRESSURE 80-89 MM HG: ICD-10-PCS | Mod: CPTII,S$GLB,, | Performed by: PHYSICIAN ASSISTANT

## 2022-06-20 PROCEDURE — 3008F BODY MASS INDEX DOCD: CPT | Mod: CPTII,S$GLB,, | Performed by: PHYSICIAN ASSISTANT

## 2022-06-20 PROCEDURE — 1100F PR PT FALLS ASSESS DOC 2+ FALLS/FALL W/INJURY/YR: ICD-10-PCS | Mod: CPTII,S$GLB,, | Performed by: PHYSICIAN ASSISTANT

## 2022-06-20 PROCEDURE — 17250 PR CHEM CAUTERY GRANULATN TISSUE: ICD-10-PCS | Mod: S$GLB,,, | Performed by: PHYSICIAN ASSISTANT

## 2022-06-20 PROCEDURE — 1159F PR MEDICATION LIST DOCUMENTED IN MEDICAL RECORD: ICD-10-PCS | Mod: CPTII,S$GLB,, | Performed by: PHYSICIAN ASSISTANT

## 2022-06-20 PROCEDURE — 99999 PR PBB SHADOW E&M-EST. PATIENT-LVL III: CPT | Mod: PBBFAC,,, | Performed by: PHYSICIAN ASSISTANT

## 2022-06-20 PROCEDURE — 17250 CHEM CAUT OF GRANLTJ TISSUE: CPT | Mod: S$GLB,,, | Performed by: PHYSICIAN ASSISTANT

## 2022-06-20 PROCEDURE — 1125F PR PAIN SEVERITY QUANTIFIED, PAIN PRESENT: ICD-10-PCS | Mod: CPTII,S$GLB,, | Performed by: PHYSICIAN ASSISTANT

## 2022-06-20 PROCEDURE — 3075F PR MOST RECENT SYSTOLIC BLOOD PRESS GE 130-139MM HG: ICD-10-PCS | Mod: CPTII,S$GLB,, | Performed by: PHYSICIAN ASSISTANT

## 2022-06-20 PROCEDURE — 1100F PTFALLS ASSESS-DOCD GE2>/YR: CPT | Mod: CPTII,S$GLB,, | Performed by: PHYSICIAN ASSISTANT

## 2022-06-20 PROCEDURE — 99213 OFFICE O/P EST LOW 20 MIN: CPT | Mod: 25,S$GLB,, | Performed by: PHYSICIAN ASSISTANT

## 2022-06-20 PROCEDURE — 4010F PR ACE/ARB THEARPY RXD/TAKEN: ICD-10-PCS | Mod: CPTII,S$GLB,, | Performed by: PHYSICIAN ASSISTANT

## 2022-06-20 NOTE — PROGRESS NOTES
"Subjective:       Patient ID: Richard Bustos is a 76 y.o. male.    Chief Complaint: Fall    Patient presents for evaluaton of wounds sustained from a fall 5 days ago.  States was walking on wet floor with his cane. Cane slipped and he fell. impacted right buttock/truck and elbows. Has a wound on right elbow that will not stop oozing with dressing changes. Did not hit head or lose consciousness. He required assistance to get up onto feet again.  He has lower extremity weakness and poor ROM due to prior hip surgeries and lumbar disc disease. Patients patient medical/surgical, social and family histories have been reviewed        Review of Systems   Musculoskeletal: Positive for arthralgias, back pain and gait problem.   Skin: Positive for color change and wound.       Objective:      Physical Exam  Constitutional:       General: He is not in acute distress.     Appearance: Normal appearance. He is not ill-appearing.      Comments: Seated comfortably in wheelchair    HENT:      Head: Normocephalic and atraumatic.   Eyes:      Conjunctiva/sclera: Conjunctivae normal.   Pulmonary:      Effort: Pulmonary effort is normal.   Skin:     Findings: Ecchymosis (right flank ) present.          Neurological:      Mental Status: He is alert.   Psychiatric:         Mood and Affect: Mood normal.         Assessment:       1. Skin avulsion    2. Fall, initial encounter    3. General weakness        Plan:       Rcihard was seen today for fall.    Diagnoses and all orders for this visit:    Skin avulsion  -  Dressing was removed after soaking with saline solution. Wound was cleaned with saline wound cleanser and patted dry.  One silver nitrate was utilized to cauterize small bleed. Foam dressing was applied       foam bandage 4 X 4 " Bndg; Change every 3 days until healed    Fall, initial encounter  -     Ambulatory referral/consult to Physical/Occupational Therapy; Future  -     [facility] FOR HOME USE    General weakness  -     Ambulatory " "referral/consult to Physical/Occupational Therapy; Future  -     [facility]ALK[facility] FOR HOME USE           Follow up as scheduled           Documentation entered by me for this encounter may have been done in part using speech-recognition technology. Although I have made an effort to ensure accuracy, "sound like" errors may exist and should be interpreted in context.   I spent a total of 23 minutes on the day of the visit.This includes face to face time and non-face to face time preparing to see the patient (eg, review of tests), obtaining and/or reviewing separately obtained history, documenting clinical information in the electronic or other health record, independently interpreting results and communicating results to the patient/family/caregiver, or care coordinator.    "

## 2022-07-06 ENCOUNTER — TELEPHONE (OUTPATIENT)
Dept: PAIN MEDICINE | Facility: CLINIC | Age: 77
End: 2022-07-06
Payer: MEDICARE

## 2022-07-06 NOTE — TELEPHONE ENCOUNTER
----- Message from Wanda Graff PA-C sent at 7/6/2022  1:12 PM CDT -----  Xray showed severe osteoarthritis in both knees. Recommend consult with orthopedics but if he wants to try another steroid injection we can schedule him for that

## 2022-07-06 NOTE — TELEPHONE ENCOUNTER
Spoke with daughter she pedro discuss with Wanda Next week at pts 3 month appt and decide on injection or ortho referral.

## 2022-07-13 ENCOUNTER — OFFICE VISIT (OUTPATIENT)
Dept: PAIN MEDICINE | Facility: CLINIC | Age: 77
End: 2022-07-13
Payer: MEDICARE

## 2022-07-13 VITALS
SYSTOLIC BLOOD PRESSURE: 98 MMHG | HEART RATE: 80 BPM | DIASTOLIC BLOOD PRESSURE: 66 MMHG | BODY MASS INDEX: 39.9 KG/M2 | WEIGHT: 269.38 LBS | HEIGHT: 69 IN

## 2022-07-13 DIAGNOSIS — M47.896 OTHER SPONDYLOSIS, LUMBAR REGION: Primary | ICD-10-CM

## 2022-07-13 DIAGNOSIS — M25.562 CHRONIC PAIN OF BOTH KNEES: ICD-10-CM

## 2022-07-13 DIAGNOSIS — M17.12 PRIMARY OSTEOARTHRITIS OF LEFT KNEE: Primary | ICD-10-CM

## 2022-07-13 DIAGNOSIS — M25.561 CHRONIC PAIN OF BOTH KNEES: ICD-10-CM

## 2022-07-13 DIAGNOSIS — G89.29 CHRONIC PAIN OF BOTH KNEES: ICD-10-CM

## 2022-07-13 DIAGNOSIS — G89.4 CHRONIC PAIN DISORDER: ICD-10-CM

## 2022-07-13 DIAGNOSIS — M51.36 DDD (DEGENERATIVE DISC DISEASE), LUMBAR: ICD-10-CM

## 2022-07-13 DIAGNOSIS — M17.12 PRIMARY OSTEOARTHRITIS OF LEFT KNEE: ICD-10-CM

## 2022-07-13 DIAGNOSIS — M48.00 CENTRAL STENOSIS OF SPINAL CANAL: ICD-10-CM

## 2022-07-13 PROCEDURE — 99214 OFFICE O/P EST MOD 30 MIN: CPT | Mod: S$GLB,,, | Performed by: PHYSICIAN ASSISTANT

## 2022-07-13 PROCEDURE — 3288F PR FALLS RISK ASSESSMENT DOCUMENTED: ICD-10-PCS | Mod: CPTII,S$GLB,, | Performed by: PHYSICIAN ASSISTANT

## 2022-07-13 PROCEDURE — 4010F PR ACE/ARB THEARPY RXD/TAKEN: ICD-10-PCS | Mod: CPTII,S$GLB,, | Performed by: PHYSICIAN ASSISTANT

## 2022-07-13 PROCEDURE — 99999 PR PBB SHADOW E&M-EST. PATIENT-LVL III: ICD-10-PCS | Mod: PBBFAC,,, | Performed by: PHYSICIAN ASSISTANT

## 2022-07-13 PROCEDURE — 3008F BODY MASS INDEX DOCD: CPT | Mod: CPTII,S$GLB,, | Performed by: PHYSICIAN ASSISTANT

## 2022-07-13 PROCEDURE — 1101F PR PT FALLS ASSESS DOC 0-1 FALLS W/OUT INJ PAST YR: ICD-10-PCS | Mod: CPTII,S$GLB,, | Performed by: PHYSICIAN ASSISTANT

## 2022-07-13 PROCEDURE — 1125F PR PAIN SEVERITY QUANTIFIED, PAIN PRESENT: ICD-10-PCS | Mod: CPTII,S$GLB,, | Performed by: PHYSICIAN ASSISTANT

## 2022-07-13 PROCEDURE — 3044F PR MOST RECENT HEMOGLOBIN A1C LEVEL <7.0%: ICD-10-PCS | Mod: CPTII,S$GLB,, | Performed by: PHYSICIAN ASSISTANT

## 2022-07-13 PROCEDURE — 3288F FALL RISK ASSESSMENT DOCD: CPT | Mod: CPTII,S$GLB,, | Performed by: PHYSICIAN ASSISTANT

## 2022-07-13 PROCEDURE — 3008F PR BODY MASS INDEX (BMI) DOCUMENTED: ICD-10-PCS | Mod: CPTII,S$GLB,, | Performed by: PHYSICIAN ASSISTANT

## 2022-07-13 PROCEDURE — 3044F HG A1C LEVEL LT 7.0%: CPT | Mod: CPTII,S$GLB,, | Performed by: PHYSICIAN ASSISTANT

## 2022-07-13 PROCEDURE — 4010F ACE/ARB THERAPY RXD/TAKEN: CPT | Mod: CPTII,S$GLB,, | Performed by: PHYSICIAN ASSISTANT

## 2022-07-13 PROCEDURE — 99214 PR OFFICE/OUTPT VISIT, EST, LEVL IV, 30-39 MIN: ICD-10-PCS | Mod: S$GLB,,, | Performed by: PHYSICIAN ASSISTANT

## 2022-07-13 PROCEDURE — 3074F SYST BP LT 130 MM HG: CPT | Mod: CPTII,S$GLB,, | Performed by: PHYSICIAN ASSISTANT

## 2022-07-13 PROCEDURE — 1101F PT FALLS ASSESS-DOCD LE1/YR: CPT | Mod: CPTII,S$GLB,, | Performed by: PHYSICIAN ASSISTANT

## 2022-07-13 PROCEDURE — 1125F AMNT PAIN NOTED PAIN PRSNT: CPT | Mod: CPTII,S$GLB,, | Performed by: PHYSICIAN ASSISTANT

## 2022-07-13 PROCEDURE — 3078F PR MOST RECENT DIASTOLIC BLOOD PRESSURE < 80 MM HG: ICD-10-PCS | Mod: CPTII,S$GLB,, | Performed by: PHYSICIAN ASSISTANT

## 2022-07-13 PROCEDURE — 3078F DIAST BP <80 MM HG: CPT | Mod: CPTII,S$GLB,, | Performed by: PHYSICIAN ASSISTANT

## 2022-07-13 PROCEDURE — 99999 PR PBB SHADOW E&M-EST. PATIENT-LVL III: CPT | Mod: PBBFAC,,, | Performed by: PHYSICIAN ASSISTANT

## 2022-07-13 PROCEDURE — 3074F PR MOST RECENT SYSTOLIC BLOOD PRESSURE < 130 MM HG: ICD-10-PCS | Mod: CPTII,S$GLB,, | Performed by: PHYSICIAN ASSISTANT

## 2022-07-13 RX ORDER — HYDROCODONE BITARTRATE AND ACETAMINOPHEN 5; 325 MG/1; MG/1
1 TABLET ORAL EVERY 8 HOURS PRN
Qty: 90 TABLET | Refills: 0 | Status: SHIPPED | OUTPATIENT
Start: 2022-08-11 | End: 2022-09-09

## 2022-07-13 RX ORDER — HYDROCODONE BITARTRATE AND ACETAMINOPHEN 5; 325 MG/1; MG/1
1 TABLET ORAL EVERY 8 HOURS PRN
Qty: 90 TABLET | Refills: 0 | Status: SHIPPED | OUTPATIENT
Start: 2022-09-09 | End: 2022-10-12

## 2022-07-13 RX ORDER — HYDROCODONE BITARTRATE AND ACETAMINOPHEN 5; 325 MG/1; MG/1
1 TABLET ORAL EVERY 8 HOURS PRN
Qty: 90 TABLET | Refills: 0 | Status: SHIPPED | OUTPATIENT
Start: 2022-07-13 | End: 2022-08-11

## 2022-07-13 NOTE — PROGRESS NOTES
FOLLOW UP NOTE:     CHIEF COMPLAINT: left knee pain, lower back pain      Interval history:  Richard Bustos is a 76 y.o. male with chronic left knee pain who presents today for f/u. He is s/p bilateral L3,4 , 5 MB RFA  8months ago with 50% relief.  Left knee injection provided moderate benefit for about 1 month. Currently c/o bilateral knee pain. Reports he feels as if knees give out but denies falling.  Denies swelling or erythema.  He has severe osteoarthritis bilaterally.  He takes Norco 5 mg very q.8 hours as needed with moderate benefit.  Denies side effects with medication.  Denies any worsening weakness.  No bowel bladder changes.  Pain today is rated 6/10.    Prior HPI: Richard Bustos is a 76 y.o. male with PMH significant for CAD s/p PCI (ASA and Plavix), atrial fibrllation on coumadin, hx of bilateral hip replacements, CKD, HTN, and DORA presents as an established patient for the continued management of back and right hip pain. Today, the patient is not complaining primarily of back pain. The patient is reporting of right hip pain with associated groin pain today. The patient reports that this began approximately 1 month ago. The patient reports that he has a prior history of a hematoma that he is concerned about given his relatively new onset right hip pain. He was seen by orthopedics, Dr. Ríos and underwent a CT guided drainage.  Hip pain is much improved.  The patient denies of any significant changes in his health since his last appointment. The patient also denies of any changes in the character of his pain since his last appointment. The patient reports of benefit on his current pain regimen. Patient denies of any urinary/fecal incontinence, saddle anesthesia, or weakness.     INTERVENTIONAL PAIN HISTORY:  11/23/21: B/L L3, 4, 5 MB RFA 50% relief  10/19/2020, 5/2021: Left knee intra-articular steroid injection   9/21/2020: Lumbar interlaminar epidural steroid injection at L5-S1  6/22/2020: L5-S1  "lumbar interlaminar epidural steroid injection - at least 50% relief  2/17/2020: Left knee intra-articular knee injection - good relief  1/10/2020: Radiofrequency ablation of the L3, L4, L5 medial branch nerves on the bilateral-side - 50% relief  8/3/2017: RFA at L3, 4, 5 - reports 80% relief    CURRENT PAIN MEDICATIONS:   Norco 5-325 mg PO BID/TID     Tried in the past:   Tramadol 50 mg PO BID for pain - mild benefit     ROS:  Review of Systems   Constitutional: Negative for chills and fever.   HENT: Negative for tinnitus.    Eyes: Negative for visual disturbance.   Respiratory: Negative for shortness of breath.    Cardiovascular: Negative for chest pain.   Gastrointestinal: Negative for nausea and vomiting.   Genitourinary: Negative for difficulty urinating.   Musculoskeletal: Positive for arthralgias and back pain.   Skin: Negative for rash.   Allergic/Immunologic: Negative for immunocompromised state.   Neurological: Negative for syncope.   Hematological: Does not bruise/bleed easily.   Psychiatric/Behavioral: Negative for suicidal ideas.        MEDICAL, SURGICAL, FAMILY, SOCIAL HX: reviewed    MEDICATIONS/ALLERGIES: reviewed    PHYSICAL EXAM:    VITALS: Vitals reviewed.   Vitals:    07/13/22 1002   BP: 98/66   Pulse: 80   Weight: 122.2 kg (269 lb 6.4 oz)   Height: 5' 9" (1.753 m)   PainSc:   6   PainLoc: Back       Physical Exam  Vitals and nursing note reviewed.   Constitutional:       Appearance: He is not diaphoretic.   HENT:      Head: Normocephalic and atraumatic.   Eyes:      General:         Right eye: No discharge.         Left eye: No discharge.      Conjunctiva/sclera: Conjunctivae normal.   Cardiovascular:      Rate and Rhythm: RRR  Pulmonary:      Effort: Pulmonary effort is normal. No respiratory distress.      Breath sounds: Normal breath sounds.   Abdominal:      Palpations: Abdomen is soft.   Musculoskeletal:      Lumbar back: Tenderness present. Negative right straight leg raise test and " negative left straight leg raise test.   Skin:     General: Skin is warm and dry.      Findings: No rash.   Neurological:      Mental Status: He is alert and oriented to person, place, and time.   Psychiatric:         Mood and Affect: Mood and affect normal.         Cognition and Memory: Memory normal.         Judgment: Judgment normal.          UPPER EXTREMITIES: Normal alignment, normal range of motion, no atrophy, no skin changes,  hair growth and nail growth normal and equal bilaterally. No swelling, no tenderness.    LOWER EXTREMITIES:  midline joint tenderness bilateral patella. + crepitus bilaterally. No laxity noted   LUMBAR SPINE  Lumbar spine: ROM is significantly limited with flexion extension and oblique extension with increased pain.    Supine straight leg raise: unable to perform secondary to fall risk   ((+)) Facet loading bilaterally  Internal and external rotation of the hips: unable to perform secondary to fall risk  Myofascial exam: No tenderness to palpation across lumbar paraspinous muscles.     MOTOR: Tone and bulk: normal bilateral upper and lower Strength: normal   Delt      Bi         Tri        WE      WF                        R          5          5          5          5          5          5            L          5          5          5          5          5          5               IP         ADD     ABD     Quad   TA        Gas      HAM  R          5          5          5          5          5          5          5  L          5          5          5          5          5          5          5     SENSATION: Light touch and pinprick intact bilaterally  REFLEXES: normal, symmetric, nonbrisk.  Toes down, no clonus. Negative roca's sign bilaterally.  GAIT: normal rise, base, steps, and arm swing.      IMAGING: no new imaging to review    ASSESSMENT: Richard Bustos is a 75 y.o. male with PMH significant for CAD s/p PCI (ASA and Plavix), atrial fibrllation on coumadin, hx of bilateral  hip replacements, CKD, HTN, and DORA presents as an established patient for the continued management of back knee and right hip pain. No recent imaging of the right hip to review today. Treatment plan outlined below.   1. Other spondylosis, lumbar region    2. Central stenosis of spinal canal    3. DDD (degenerative disc disease), lumbar    4. Chronic pain of both knees    5. Primary osteoarthritis of left knee        PLAN:  1. I have stressed the importance of physical activity and exercise to improve overall health  2. Reviewed pertinent imaging and records with patient  3  Patient with significant benefit following Lumbar STARLA.  Patient will continue to monitor his progress.  May consider repeat procedure in future if pain returns or worsens.   4. Monitor progress from lumbar MB RFA at L3, 4, 5   5. Relief for only 1 month with intra articular knee injection. I believe he would benefit from viscosupplimenation. Schedule left knee monovisc.   6. He can continue Norco 5mg q8hrs as needed.   reviewed.  Previous UDS consistent  7. Follow up for Monovisc and in 3 months  Medication management per Dr. Causey

## 2022-07-27 ENCOUNTER — OFFICE VISIT (OUTPATIENT)
Dept: PAIN MEDICINE | Facility: CLINIC | Age: 77
End: 2022-07-27
Payer: MEDICARE

## 2022-07-27 VITALS
SYSTOLIC BLOOD PRESSURE: 125 MMHG | HEART RATE: 49 BPM | DIASTOLIC BLOOD PRESSURE: 69 MMHG | HEIGHT: 69 IN | BODY MASS INDEX: 39.84 KG/M2 | WEIGHT: 269 LBS

## 2022-07-27 DIAGNOSIS — M17.12 PRIMARY OSTEOARTHRITIS OF LEFT KNEE: Primary | ICD-10-CM

## 2022-07-27 DIAGNOSIS — M47.896 OTHER SPONDYLOSIS, LUMBAR REGION: ICD-10-CM

## 2022-07-27 DIAGNOSIS — G89.4 CHRONIC PAIN DISORDER: ICD-10-CM

## 2022-07-27 DIAGNOSIS — M25.562 CHRONIC PAIN OF BOTH KNEES: ICD-10-CM

## 2022-07-27 DIAGNOSIS — G89.29 CHRONIC PAIN OF BOTH KNEES: ICD-10-CM

## 2022-07-27 DIAGNOSIS — M25.561 CHRONIC PAIN OF BOTH KNEES: ICD-10-CM

## 2022-07-27 PROCEDURE — 20610 DRAIN/INJ JOINT/BURSA W/O US: CPT | Mod: LT,S$GLB,, | Performed by: ANESTHESIOLOGY

## 2022-07-27 PROCEDURE — 1101F PR PT FALLS ASSESS DOC 0-1 FALLS W/OUT INJ PAST YR: ICD-10-PCS | Mod: CPTII,S$GLB,, | Performed by: ANESTHESIOLOGY

## 2022-07-27 PROCEDURE — 20610 LARGE JOINT ASPIRATION/INJECTION: L KNEE: ICD-10-PCS | Mod: LT,S$GLB,, | Performed by: ANESTHESIOLOGY

## 2022-07-27 PROCEDURE — 4010F ACE/ARB THERAPY RXD/TAKEN: CPT | Mod: CPTII,S$GLB,, | Performed by: ANESTHESIOLOGY

## 2022-07-27 PROCEDURE — 4010F PR ACE/ARB THEARPY RXD/TAKEN: ICD-10-PCS | Mod: CPTII,S$GLB,, | Performed by: ANESTHESIOLOGY

## 2022-07-27 PROCEDURE — 1125F AMNT PAIN NOTED PAIN PRSNT: CPT | Mod: CPTII,S$GLB,, | Performed by: ANESTHESIOLOGY

## 2022-07-27 PROCEDURE — 1101F PT FALLS ASSESS-DOCD LE1/YR: CPT | Mod: CPTII,S$GLB,, | Performed by: ANESTHESIOLOGY

## 2022-07-27 PROCEDURE — 99999 PR PBB SHADOW E&M-EST. PATIENT-LVL IV: CPT | Mod: PBBFAC,,, | Performed by: ANESTHESIOLOGY

## 2022-07-27 PROCEDURE — 3288F FALL RISK ASSESSMENT DOCD: CPT | Mod: CPTII,S$GLB,, | Performed by: ANESTHESIOLOGY

## 2022-07-27 PROCEDURE — 3044F PR MOST RECENT HEMOGLOBIN A1C LEVEL <7.0%: ICD-10-PCS | Mod: CPTII,S$GLB,, | Performed by: ANESTHESIOLOGY

## 2022-07-27 PROCEDURE — 1159F PR MEDICATION LIST DOCUMENTED IN MEDICAL RECORD: ICD-10-PCS | Mod: CPTII,S$GLB,, | Performed by: ANESTHESIOLOGY

## 2022-07-27 PROCEDURE — 99999 PR PBB SHADOW E&M-EST. PATIENT-LVL IV: ICD-10-PCS | Mod: PBBFAC,,, | Performed by: ANESTHESIOLOGY

## 2022-07-27 PROCEDURE — 1125F PR PAIN SEVERITY QUANTIFIED, PAIN PRESENT: ICD-10-PCS | Mod: CPTII,S$GLB,, | Performed by: ANESTHESIOLOGY

## 2022-07-27 PROCEDURE — 3078F DIAST BP <80 MM HG: CPT | Mod: CPTII,S$GLB,, | Performed by: ANESTHESIOLOGY

## 2022-07-27 PROCEDURE — 3078F PR MOST RECENT DIASTOLIC BLOOD PRESSURE < 80 MM HG: ICD-10-PCS | Mod: CPTII,S$GLB,, | Performed by: ANESTHESIOLOGY

## 2022-07-27 PROCEDURE — 3008F BODY MASS INDEX DOCD: CPT | Mod: CPTII,S$GLB,, | Performed by: ANESTHESIOLOGY

## 2022-07-27 PROCEDURE — 3008F PR BODY MASS INDEX (BMI) DOCUMENTED: ICD-10-PCS | Mod: CPTII,S$GLB,, | Performed by: ANESTHESIOLOGY

## 2022-07-27 PROCEDURE — 1159F MED LIST DOCD IN RCRD: CPT | Mod: CPTII,S$GLB,, | Performed by: ANESTHESIOLOGY

## 2022-07-27 PROCEDURE — 3074F SYST BP LT 130 MM HG: CPT | Mod: CPTII,S$GLB,, | Performed by: ANESTHESIOLOGY

## 2022-07-27 PROCEDURE — 99213 PR OFFICE/OUTPT VISIT, EST, LEVL III, 20-29 MIN: ICD-10-PCS | Mod: 25,S$GLB,, | Performed by: ANESTHESIOLOGY

## 2022-07-27 PROCEDURE — 3074F PR MOST RECENT SYSTOLIC BLOOD PRESSURE < 130 MM HG: ICD-10-PCS | Mod: CPTII,S$GLB,, | Performed by: ANESTHESIOLOGY

## 2022-07-27 PROCEDURE — 99213 OFFICE O/P EST LOW 20 MIN: CPT | Mod: 25,S$GLB,, | Performed by: ANESTHESIOLOGY

## 2022-07-27 PROCEDURE — 3044F HG A1C LEVEL LT 7.0%: CPT | Mod: CPTII,S$GLB,, | Performed by: ANESTHESIOLOGY

## 2022-07-27 PROCEDURE — 3288F PR FALLS RISK ASSESSMENT DOCUMENTED: ICD-10-PCS | Mod: CPTII,S$GLB,, | Performed by: ANESTHESIOLOGY

## 2022-07-27 NOTE — PROCEDURES
Large Joint Aspiration/Injection: L knee    Date/Time: 7/27/2022 11:00 AM  Performed by: Nile Causey MD  Authorized by: Nile Causey MD     Consent Done?:  Yes (Verbal)  Indications:  Arthritis and pain  Site marked: the procedure site was marked    Timeout: prior to procedure the correct patient, procedure, and site was verified    Prep: patient was prepped and draped in usual sterile fashion      Details:  Needle Size:  22 G  Ultrasonic Guidance for needle placement?: No    Approach:  Anterolateral  Location:  Knee  Site:  L knee  Medications:  88 mg hyaluronate sodium, stabilized 88 mg/4 mL  Patient tolerance:  Patient tolerated the procedure well with no immediate complications

## 2022-07-27 NOTE — PROGRESS NOTES
FOLLOW UP NOTE:     CHIEF COMPLAINT: left knee pain, lower back pain      Interval history:  Richard Bustos is a 76 y.o. male with chronic left knee pain who presents today for f/u. He is s/p bilateral L3,4 , 5 MB RFA  8 months ago with 50% relief.  Left knee injection provided moderate benefit for about 1 month. Currently c/o bilateral knee pain. Reports he feels as if knees give out but denies falling.  Denies swelling or erythema.  He has severe osteoarthritis bilaterally.  He is here for monovisc injection in the left knee.  He takes Norco 5 mg very q.8 hours as needed with moderate benefit.  Denies side effects with medication.  Denies any worsening weakness.  No bowel bladder changes.  Pain today is rated 6/10.    Prior HPI: Richard Bustos is a 76 y.o. male with PMH significant for CAD s/p PCI (ASA and Plavix), atrial fibrllation on coumadin, hx of bilateral hip replacements, CKD, HTN, and DORA presents as an established patient for the continued management of back and right hip pain. Today, the patient is not complaining primarily of back pain. The patient is reporting of right hip pain with associated groin pain today. The patient reports that this began approximately 1 month ago. The patient reports that he has a prior history of a hematoma that he is concerned about given his relatively new onset right hip pain. He was seen by orthopedics, Dr. Ríos and underwent a CT guided drainage.  Hip pain is much improved.  The patient denies of any significant changes in his health since his last appointment. The patient also denies of any changes in the character of his pain since his last appointment. The patient reports of benefit on his current pain regimen. Patient denies of any urinary/fecal incontinence, saddle anesthesia, or weakness.     INTERVENTIONAL PAIN HISTORY:  11/23/21: B/L L3, 4, 5 MB RFA 50% relief  10/19/2020, 5/2021: Left knee intra-articular steroid injection   9/21/2020: Lumbar interlaminar  "epidural steroid injection at L5-S1  6/22/2020: L5-S1 lumbar interlaminar epidural steroid injection - at least 50% relief  2/17/2020: Left knee intra-articular knee injection - good relief  1/10/2020: Radiofrequency ablation of the L3, L4, L5 medial branch nerves on the bilateral-side - 50% relief  8/3/2017: RFA at L3, 4, 5 - reports 80% relief    CURRENT PAIN MEDICATIONS:   Norco 5-325 mg PO BID/TID     Tried in the past:   Tramadol 50 mg PO BID for pain - mild benefit     ROS:  Review of Systems   Constitutional: Negative for chills and fever.   HENT: Negative for tinnitus.    Eyes: Negative for visual disturbance.   Respiratory: Negative for shortness of breath.    Cardiovascular: Negative for chest pain.   Gastrointestinal: Negative for nausea and vomiting.   Genitourinary: Negative for difficulty urinating.   Musculoskeletal: Positive for arthralgias and back pain.   Skin: Negative for rash.   Allergic/Immunologic: Negative for immunocompromised state.   Neurological: Negative for syncope.   Hematological: Does not bruise/bleed easily.   Psychiatric/Behavioral: Negative for suicidal ideas.        MEDICAL, SURGICAL, FAMILY, SOCIAL HX: reviewed    MEDICATIONS/ALLERGIES: reviewed    PHYSICAL EXAM:    VITALS: Vitals reviewed.   Vitals:    07/27/22 0958   BP: 125/69   Pulse: (!) 49   Weight: 122 kg (269 lb)   Height: 5' 9" (1.753 m)   PainSc:   2   PainLoc: Knee       Physical Exam  Vitals and nursing note reviewed.   Constitutional:       Appearance: He is not diaphoretic.   HENT:      Head: Normocephalic and atraumatic.   Eyes:      General:         Right eye: No discharge.         Left eye: No discharge.      Conjunctiva/sclera: Conjunctivae normal.   Cardiovascular:      Rate and Rhythm: RRR  Pulmonary:      Effort: Pulmonary effort is normal. No respiratory distress.      Breath sounds: Normal breath sounds.   Abdominal:      Palpations: Abdomen is soft.   Musculoskeletal:      Lumbar back: Tenderness present. " Negative right straight leg raise test and negative left straight leg raise test.   Skin:     General: Skin is warm and dry.      Findings: No rash.   Neurological:      Mental Status: He is alert and oriented to person, place, and time.   Psychiatric:         Mood and Affect: Mood and affect normal.         Cognition and Memory: Memory normal.         Judgment: Judgment normal.          UPPER EXTREMITIES: Normal alignment, normal range of motion, no atrophy, no skin changes,  hair growth and nail growth normal and equal bilaterally. No swelling, no tenderness.    LOWER EXTREMITIES:  midline joint tenderness bilateral patella. + crepitus bilaterally. No laxity noted   LUMBAR SPINE  Lumbar spine: ROM is significantly limited with flexion extension and oblique extension with increased pain.    Supine straight leg raise: unable to perform secondary to fall risk   ((+)) Facet loading bilaterally  Internal and external rotation of the hips: unable to perform secondary to fall risk  Myofascial exam: No tenderness to palpation across lumbar paraspinous muscles.     MOTOR: Tone and bulk: normal bilateral upper and lower Strength: normal   Delt      Bi         Tri        WE      WF                        R          5          5          5          5          5          5            L          5          5          5          5          5          5               IP         ADD     ABD     Quad   TA        Gas      HAM  R          5          5          5          5          5          5          5  L          5          5          5          5          5          5          5     SENSATION: Light touch and pinprick intact bilaterally  REFLEXES: normal, symmetric, nonbrisk.  Toes down, no clonus. Negative roca's sign bilaterally.  GAIT: normal rise, base, steps, and arm swing.      IMAGING: no new imaging to review    ASSESSMENT: Richard Bustos is a 75 y.o. male with PMH significant for CAD s/p PCI (ASA and Plavix),  atrial fibrllation on coumadin, hx of bilateral hip replacements, CKD, HTN, and DORA presents as an established patient for the continued management of back knee and right hip pain. No recent imaging of the right hip to review today. Treatment plan outlined below.   1. Primary osteoarthritis of left knee    2. Other spondylosis, lumbar region    3. Chronic pain of both knees    4. Chronic pain disorder        PLAN:  1. I have stressed the importance of physical activity and exercise to improve overall health  2. Reviewed pertinent imaging and records with patient  3  Patient with significant benefit following Lumbar STARLA.  Patient will continue to monitor his progress.  May consider repeat procedure in future if pain returns or worsens.   4. Monitor progress from lumbar MB RFA at L3, 4, 5   5. Perform left knee monovisc today.    6. He can continue Norco 5mg q8hrs as needed. Scripts given last visit   reviewed.  Previous UDS consistent  7. Follow up as scheduled

## 2022-08-11 ENCOUNTER — PATIENT MESSAGE (OUTPATIENT)
Dept: PODIATRY | Facility: CLINIC | Age: 77
End: 2022-08-11

## 2022-08-11 ENCOUNTER — OFFICE VISIT (OUTPATIENT)
Dept: PODIATRY | Facility: CLINIC | Age: 77
End: 2022-08-11
Payer: MEDICARE

## 2022-08-11 VITALS
DIASTOLIC BLOOD PRESSURE: 63 MMHG | BODY MASS INDEX: 39.49 KG/M2 | HEIGHT: 69 IN | SYSTOLIC BLOOD PRESSURE: 135 MMHG | HEART RATE: 59 BPM | WEIGHT: 266.63 LBS

## 2022-08-11 DIAGNOSIS — B35.3 TINEA PEDIS OF LEFT FOOT: ICD-10-CM

## 2022-08-11 DIAGNOSIS — E11.49 TYPE II DIABETES MELLITUS WITH NEUROLOGICAL MANIFESTATIONS: Primary | ICD-10-CM

## 2022-08-11 DIAGNOSIS — B35.1 ONYCHOMYCOSIS DUE TO DERMATOPHYTE: ICD-10-CM

## 2022-08-11 DIAGNOSIS — Z87.898 HISTORY OF ULCERATION: ICD-10-CM

## 2022-08-11 PROCEDURE — 99214 OFFICE O/P EST MOD 30 MIN: CPT | Mod: 25,S$GLB,, | Performed by: PODIATRIST

## 2022-08-11 PROCEDURE — 3288F FALL RISK ASSESSMENT DOCD: CPT | Mod: CPTII,S$GLB,, | Performed by: PODIATRIST

## 2022-08-11 PROCEDURE — 99214 PR OFFICE/OUTPT VISIT, EST, LEVL IV, 30-39 MIN: ICD-10-PCS | Mod: 25,S$GLB,, | Performed by: PODIATRIST

## 2022-08-11 PROCEDURE — 3288F PR FALLS RISK ASSESSMENT DOCUMENTED: ICD-10-PCS | Mod: CPTII,S$GLB,, | Performed by: PODIATRIST

## 2022-08-11 PROCEDURE — 1101F PT FALLS ASSESS-DOCD LE1/YR: CPT | Mod: CPTII,S$GLB,, | Performed by: PODIATRIST

## 2022-08-11 PROCEDURE — 1159F PR MEDICATION LIST DOCUMENTED IN MEDICAL RECORD: ICD-10-PCS | Mod: CPTII,S$GLB,, | Performed by: PODIATRIST

## 2022-08-11 PROCEDURE — 3078F PR MOST RECENT DIASTOLIC BLOOD PRESSURE < 80 MM HG: ICD-10-PCS | Mod: CPTII,S$GLB,, | Performed by: PODIATRIST

## 2022-08-11 PROCEDURE — 99999 PR PBB SHADOW E&M-EST. PATIENT-LVL IV: ICD-10-PCS | Mod: PBBFAC,,, | Performed by: PODIATRIST

## 2022-08-11 PROCEDURE — 4010F ACE/ARB THERAPY RXD/TAKEN: CPT | Mod: CPTII,S$GLB,, | Performed by: PODIATRIST

## 2022-08-11 PROCEDURE — 3008F BODY MASS INDEX DOCD: CPT | Mod: CPTII,S$GLB,, | Performed by: PODIATRIST

## 2022-08-11 PROCEDURE — 1126F AMNT PAIN NOTED NONE PRSNT: CPT | Mod: CPTII,S$GLB,, | Performed by: PODIATRIST

## 2022-08-11 PROCEDURE — 3008F PR BODY MASS INDEX (BMI) DOCUMENTED: ICD-10-PCS | Mod: CPTII,S$GLB,, | Performed by: PODIATRIST

## 2022-08-11 PROCEDURE — 3075F PR MOST RECENT SYSTOLIC BLOOD PRESS GE 130-139MM HG: ICD-10-PCS | Mod: CPTII,S$GLB,, | Performed by: PODIATRIST

## 2022-08-11 PROCEDURE — 3075F SYST BP GE 130 - 139MM HG: CPT | Mod: CPTII,S$GLB,, | Performed by: PODIATRIST

## 2022-08-11 PROCEDURE — 99999 PR PBB SHADOW E&M-EST. PATIENT-LVL IV: CPT | Mod: PBBFAC,,, | Performed by: PODIATRIST

## 2022-08-11 PROCEDURE — 1101F PR PT FALLS ASSESS DOC 0-1 FALLS W/OUT INJ PAST YR: ICD-10-PCS | Mod: CPTII,S$GLB,, | Performed by: PODIATRIST

## 2022-08-11 PROCEDURE — 1159F MED LIST DOCD IN RCRD: CPT | Mod: CPTII,S$GLB,, | Performed by: PODIATRIST

## 2022-08-11 PROCEDURE — 3044F HG A1C LEVEL LT 7.0%: CPT | Mod: CPTII,S$GLB,, | Performed by: PODIATRIST

## 2022-08-11 PROCEDURE — 1126F PR PAIN SEVERITY QUANTIFIED, NO PAIN PRESENT: ICD-10-PCS | Mod: CPTII,S$GLB,, | Performed by: PODIATRIST

## 2022-08-11 PROCEDURE — 4010F PR ACE/ARB THEARPY RXD/TAKEN: ICD-10-PCS | Mod: CPTII,S$GLB,, | Performed by: PODIATRIST

## 2022-08-11 PROCEDURE — 3078F DIAST BP <80 MM HG: CPT | Mod: CPTII,S$GLB,, | Performed by: PODIATRIST

## 2022-08-11 PROCEDURE — 3044F PR MOST RECENT HEMOGLOBIN A1C LEVEL <7.0%: ICD-10-PCS | Mod: CPTII,S$GLB,, | Performed by: PODIATRIST

## 2022-08-11 RX ORDER — CICLOPIROX 7.7 MG/G
GEL TOPICAL 2 TIMES DAILY
Qty: 100 G | Refills: 3 | Status: SHIPPED | OUTPATIENT
Start: 2022-08-11 | End: 2023-02-15

## 2022-08-11 NOTE — PROGRESS NOTES
Subjective:      Patient ID: Richard Bustos is a 76 y.o. male.    Chief Complaint: Foot Problem (Drainage in between toes and swollen)    Richard is a 76 y.o. male who presents to the clinic for evaluation and treatment of high risk feet. Richard has a past medical history of Anemia, Anemia of other chronic disease (09/13/2017), Anemia, chronic renal failure (09/13/2017), Anemia, unspecified (09/13/2017), Anticoagulant long-term use, Aorta aneurysm, Arthritis, Atrial fibrillation, Bacteremia due to Streptococcus (01/08/2022), CAD (coronary artery disease), CHF (congestive heart failure), Chronic kidney disease, Clotting disorder (09/13/2017), Colon polyp, Dementia, Diabetes mellitus, Diabetes mellitus type II, Diverticulosis, Elevated PSA, Encounter for blood transfusion, Former smoker, General anesthetics causing adverse effect in therapeutic use, GERD (gastroesophageal reflux disease), Gout, colonic polyps, Hypercoagulable state, Hyperlipidemia, Hypertension, MI, old (2010), MTHFR mutation, Myocardial infarction, Osteomyelitis of ankle or foot (03/09/2017), Prostate cancer (06/2016), Sleep apnea, Squamous cell carcinoma (01/23/2018), and Venous stasis. The patient's chief complaint is long, thick toenails. Gradual onset, worsening over past several weeks, aggravated by increased weight bearing, shoe gear, pressure.  No previous medical treatment.  OTC pain med not helping.     cc2 moist redness between toes 1,2,3 left.  Gradual onset, worsening over past several weeks, aggravated by increased weight bearing, shoe gear, pressure.  No previous medical treatment.  OTC  med not helping.     cc3 pain lateral left forefoot.  Gradual onset, worsening over past several weeks, aggravated by increased weight bearing, shoe gear, pressure.  No previous medical treatment.  OTC pain med not helping.     PCP: Familia Ramirez Jr, MD    Date Last Seen by PCP:   Chief Complaint   Patient presents with    Foot Problem     Drainage in  between toes and swollen        Current shoe gear:  Affected Foot: Rx diabetic extra depth shoes and custom accommodative insoles     Unaffected Foot: Rx diabetic extra depth shoes and custom accommodative insoles    Hemoglobin A1C   Date Value Ref Range Status   01/06/2022 5.1 4.0 - 5.6 % Final     Comment:     ADA Screening Guidelines:  5.7-6.4%  Consistent with prediabetes  >or=6.5%  Consistent with diabetes    High levels of fetal hemoglobin interfere with the HbA1C  assay. Heterozygous hemoglobin variants (HbS, HgC, etc)do  not significantly interfere with this assay.   However, presence of multiple variants may affect accuracy.     08/18/2021 5.3 4.0 - 5.6 % Final     Comment:     ADA Screening Guidelines:  5.7-6.4%  Consistent with prediabetes  >or=6.5%  Consistent with diabetes    High levels of fetal hemoglobin interfere with the HbA1C  assay. Heterozygous hemoglobin variants (HbS, HgC, etc)do  not significantly interfere with this assay.   However, presence of multiple variants may affect accuracy.     11/17/2020 6.1 (H) 4.0 - 5.6 % Final     Comment:     ADA Screening Guidelines:  5.7-6.4%  Consistent with prediabetes  >or=6.5%  Consistent with diabetes  High levels of fetal hemoglobin interfere with the HbA1C  assay. Heterozygous hemoglobin variants (HbS, HgC, etc)do  not significantly interfere with this assay.   However, presence of multiple variants may affect accuracy.     06/17/2013 6.8 (H) 4.8 - 5.6 % Final     Comment:              Increased risk for diabetes: 5.7 - 6.4           Diabetes: >6.4           Glycemic control for adults with diabetes: <7.0  **In order to standardize %HbA1c results worldwide, as of October 11, 2010,  the %HbA1c is being calculated using the master equation recommended in the  consensus statement adopted by the ADA (American Diabetes Assoc), EASD  (European Assoc for the Study of Diabetes), IFCC (International Federation  of Clinical Chemistry and Laboratory Medicine)  and IDF (International  Diabetes Federation). Result units: %HgbA1c (DCCT/NGSP).  In common with other methods, Hb A1C values may not accurately reflect mean  blood glucose in patients with hemoglobin variants (HgbF, HgbS and HgbC).  Any cause of shortened erythrocyte survival will reduce exposure of  erythrocytes to glucose with a consequent decrease in HbA1c (%) values, even  though the time-averaged blood glucose level may be elevated. Causes of  shortened erythrocyte lifetime might be hemolytic anemia or other hemolytic  diseases, homozygous sickle cell trait, pregnancy, recent significant or  chronic blood loss, etc. Caution should be used when interpreting the HbA1c  results from patients with these conditions.       Review of Systems   Constitutional: Negative for chills, diaphoresis, fever, malaise/fatigue and night sweats.   Cardiovascular: Positive for leg swelling. Negative for claudication, cyanosis and syncope.   Skin: Positive for itching, nail changes and rash. Negative for color change, dry skin, suspicious lesions and unusual hair distribution.   Musculoskeletal: Positive for joint pain. Negative for falls, joint swelling, muscle cramps, muscle weakness and stiffness.   Gastrointestinal: Negative for constipation, diarrhea, nausea and vomiting.   Neurological: Positive for numbness, paresthesias and sensory change. Negative for brief paralysis, disturbances in coordination, focal weakness and tremors.           Objective:      Physical Exam  Constitutional:       Appearance: He is well-developed. He is not diaphoretic.      Comments: Oriented to time, place, and person.   Cardiovascular:      Pulses:           Dorsalis pedis pulses are 1+ on the right side and 1+ on the left side.        Posterior tibial pulses are 1+ on the right side and 1+ on the left side.      Comments: Capillary fill time 3-5 seconds.  All toes warm to touch.      Trace lower extremity edema bilateral.    Negative elevational  pallor and dependent rubor bilateral.    Musculoskeletal:      Comments: Normal angle, base, station of gait. Decreased stride length, early heel off, moderately propulsive toe off bilateral.    All ten toes without clubbing, cyanosis, or signs of ischemia.      Tenderness to palpation lateral inferior 5th metatarsophalangeal joint left without deformity, loss of function, signs of acute trauma.      Range of motion, stability, muscle strength, and muscle tone are age and health appropriate normal bilateral feet and legs.       Skin:     General: Skin is warm and dry.      Coloration: Skin is not pale.      Findings: No abrasion, bruising, burn, ecchymosis, erythema, laceration, lesion, petechiae or rash.      Nails: There is no clubbing.      Comments: Moist skin slough over mildly erythematous base in the interdigital spaces between toes 123 left.neginf    Otherwise,  Skin thin, atrophic, with decreased density and distribution of pedal hair bilateral, but without hyperpigmentation, minerva discoloration,  ulcers, masses, nodules or cords palpated bilateral feet and legs.      Toenails 1st, 2nd, 3rd, 4th, 5th  bilateral are hypertrophic thickened 2-3 mm, dystrophic, discolored tanish brown with tan, gray crumbly subungual debris.  Tender to distal nail plate pressure, without periungual skin abnormality of each.     Neurological:      Mental Status: He is alert and oriented to person, place, and time. He is not disoriented.      Sensory: Sensory deficit present.      Motor: No tremor, atrophy or abnormal muscle tone.      Comments: Decreased/absent vibratory sensation bilateral feet to 128Hz tuning fork.    Diminished/loss of protective sensation all toes bilateral to 10 gram monofilament.    Paresthesias, and burning bilateral feet with no clearly identified trigger or source.     Psychiatric:         Behavior: Behavior is cooperative.               Assessment:       Encounter Diagnoses   Name Primary?    Type II  diabetes mellitus with neurological manifestations Yes    History of ulceration     Onychomycosis due to dermatophyte     Tinea pedis of left foot          Plan:       Richard was seen today for foot problem.    Diagnoses and all orders for this visit:    Type II diabetes mellitus with neurological manifestations  -     ORTHOTIC DEVICE (DME)  -      DIABETES FOOT EXAM    History of ulceration  -     ORTHOTIC DEVICE (DME)  -      DIABETES FOOT EXAM    Onychomycosis due to dermatophyte  -     ORTHOTIC DEVICE (DME)  -      DIABETES FOOT EXAM    Tinea pedis of left foot  -     ORTHOTIC DEVICE (DME)  -      DIABETES FOOT EXAM    Other orders  -     ciclopirox 0.77 % Gel; Apply topically 2 (two) times daily.      I counseled the patient on his conditions, their implications and medical management.        Prescription written modify left shoe by stretching over the 5th MTPJ area.  Also prescription at height to the right foot on an external left of 1 quarter-inch to compensate for limb length discrepancy from total hip replacement.    The patient has received literature on basic diabetic foot care.  Patient will inspect feet daily, wear protective shoe gear when ambulatory, and apply moisturizer to skin as needed to maintain elasticity and help prevent ulceration.    With the patient's permission, I debrided all ten toenails with a sterile nipper and curette, removing all offending nail and debris.  Patient tolerated the procedure well and related significant relief.    Loprox gel bid left forefoot between toes 1,2,3          No follow-ups on file.

## 2022-08-17 ENCOUNTER — PATIENT MESSAGE (OUTPATIENT)
Dept: PODIATRY | Facility: CLINIC | Age: 77
End: 2022-08-17
Payer: MEDICARE

## 2022-08-24 ENCOUNTER — OFFICE VISIT (OUTPATIENT)
Dept: PODIATRY | Facility: CLINIC | Age: 77
End: 2022-08-24
Payer: MEDICARE

## 2022-08-24 ENCOUNTER — PATIENT MESSAGE (OUTPATIENT)
Dept: ADMINISTRATIVE | Facility: HOSPITAL | Age: 77
End: 2022-08-24
Payer: MEDICARE

## 2022-08-24 VITALS
SYSTOLIC BLOOD PRESSURE: 184 MMHG | BODY MASS INDEX: 39.1 KG/M2 | DIASTOLIC BLOOD PRESSURE: 96 MMHG | WEIGHT: 264 LBS | HEART RATE: 64 BPM | HEIGHT: 69 IN

## 2022-08-24 DIAGNOSIS — L97.522 ULCER OF TOE, LEFT, WITH FAT LAYER EXPOSED: ICD-10-CM

## 2022-08-24 DIAGNOSIS — E11.49 TYPE II DIABETES MELLITUS WITH NEUROLOGICAL MANIFESTATIONS: Primary | ICD-10-CM

## 2022-08-24 PROCEDURE — 3080F PR MOST RECENT DIASTOLIC BLOOD PRESSURE >= 90 MM HG: ICD-10-PCS | Mod: CPTII,S$GLB,, | Performed by: PODIATRIST

## 2022-08-24 PROCEDURE — 3044F PR MOST RECENT HEMOGLOBIN A1C LEVEL <7.0%: ICD-10-PCS | Mod: CPTII,S$GLB,, | Performed by: PODIATRIST

## 2022-08-24 PROCEDURE — 3077F PR MOST RECENT SYSTOLIC BLOOD PRESSURE >= 140 MM HG: ICD-10-PCS | Mod: CPTII,S$GLB,, | Performed by: PODIATRIST

## 2022-08-24 PROCEDURE — 87070 CULTURE OTHR SPECIMN AEROBIC: CPT | Performed by: PODIATRIST

## 2022-08-24 PROCEDURE — 99999 PR PBB SHADOW E&M-EST. PATIENT-LVL V: ICD-10-PCS | Mod: PBBFAC,,, | Performed by: PODIATRIST

## 2022-08-24 PROCEDURE — 3008F PR BODY MASS INDEX (BMI) DOCUMENTED: ICD-10-PCS | Mod: CPTII,S$GLB,, | Performed by: PODIATRIST

## 2022-08-24 PROCEDURE — 99999 PR PBB SHADOW E&M-EST. PATIENT-LVL V: CPT | Mod: PBBFAC,,, | Performed by: PODIATRIST

## 2022-08-24 PROCEDURE — 99214 PR OFFICE/OUTPT VISIT, EST, LEVL IV, 30-39 MIN: ICD-10-PCS | Mod: 25,S$GLB,, | Performed by: PODIATRIST

## 2022-08-24 PROCEDURE — 99214 OFFICE O/P EST MOD 30 MIN: CPT | Mod: 25,S$GLB,, | Performed by: PODIATRIST

## 2022-08-24 PROCEDURE — 3077F SYST BP >= 140 MM HG: CPT | Mod: CPTII,S$GLB,, | Performed by: PODIATRIST

## 2022-08-24 PROCEDURE — 1159F PR MEDICATION LIST DOCUMENTED IN MEDICAL RECORD: ICD-10-PCS | Mod: CPTII,S$GLB,, | Performed by: PODIATRIST

## 2022-08-24 PROCEDURE — 1126F PR PAIN SEVERITY QUANTIFIED, NO PAIN PRESENT: ICD-10-PCS | Mod: CPTII,S$GLB,, | Performed by: PODIATRIST

## 2022-08-24 PROCEDURE — 3288F PR FALLS RISK ASSESSMENT DOCUMENTED: ICD-10-PCS | Mod: CPTII,S$GLB,, | Performed by: PODIATRIST

## 2022-08-24 PROCEDURE — 1126F AMNT PAIN NOTED NONE PRSNT: CPT | Mod: CPTII,S$GLB,, | Performed by: PODIATRIST

## 2022-08-24 PROCEDURE — 3008F BODY MASS INDEX DOCD: CPT | Mod: CPTII,S$GLB,, | Performed by: PODIATRIST

## 2022-08-24 PROCEDURE — 3080F DIAST BP >= 90 MM HG: CPT | Mod: CPTII,S$GLB,, | Performed by: PODIATRIST

## 2022-08-24 PROCEDURE — 3044F HG A1C LEVEL LT 7.0%: CPT | Mod: CPTII,S$GLB,, | Performed by: PODIATRIST

## 2022-08-24 PROCEDURE — 3288F FALL RISK ASSESSMENT DOCD: CPT | Mod: CPTII,S$GLB,, | Performed by: PODIATRIST

## 2022-08-24 PROCEDURE — 1101F PR PT FALLS ASSESS DOC 0-1 FALLS W/OUT INJ PAST YR: ICD-10-PCS | Mod: CPTII,S$GLB,, | Performed by: PODIATRIST

## 2022-08-24 PROCEDURE — 87186 SC STD MICRODIL/AGAR DIL: CPT | Performed by: PODIATRIST

## 2022-08-24 PROCEDURE — 87077 CULTURE AEROBIC IDENTIFY: CPT | Performed by: PODIATRIST

## 2022-08-24 PROCEDURE — 1101F PT FALLS ASSESS-DOCD LE1/YR: CPT | Mod: CPTII,S$GLB,, | Performed by: PODIATRIST

## 2022-08-24 PROCEDURE — 87075 CULTR BACTERIA EXCEPT BLOOD: CPT | Performed by: PODIATRIST

## 2022-08-24 PROCEDURE — 1159F MED LIST DOCD IN RCRD: CPT | Mod: CPTII,S$GLB,, | Performed by: PODIATRIST

## 2022-08-24 PROCEDURE — 4010F PR ACE/ARB THEARPY RXD/TAKEN: ICD-10-PCS | Mod: CPTII,S$GLB,, | Performed by: PODIATRIST

## 2022-08-24 PROCEDURE — 4010F ACE/ARB THERAPY RXD/TAKEN: CPT | Mod: CPTII,S$GLB,, | Performed by: PODIATRIST

## 2022-08-24 RX ORDER — CLINDAMYCIN HYDROCHLORIDE 300 MG/1
300 CAPSULE ORAL 3 TIMES DAILY
Qty: 30 CAPSULE | Refills: 0 | Status: SHIPPED | OUTPATIENT
Start: 2022-08-24 | End: 2022-09-03

## 2022-08-24 NOTE — PROGRESS NOTES
Subjective:      Patient ID: Richard Bustos is a 76 y.o. male.    Chief Complaint: Foot Problem (L foot toe 1,2 and 3. Open wound)    Aching redness left 2nd toe with reported open wound.  Unknown duration, daughter discovered yesterday.  No change since.  Aggravated with pressure shoes ambulation.  No prior medical treatment.  Self-treatment with dressings and wound foam padding.    Review of Systems   Constitutional: Negative for chills, fever, malaise/fatigue and night sweats.   Cardiovascular: Positive for leg swelling. Negative for claudication and cyanosis.   Skin: Positive for poor wound healing. Negative for color change, itching, rash and suspicious lesions.   Musculoskeletal: Negative for falls, gout, joint pain and myalgias.   Neurological: Positive for numbness and sensory change.           Objective:      Physical Exam  Constitutional:       General: He is not in acute distress.     Appearance: He is well-developed. He is not diaphoretic.   Cardiovascular:      Pulses:           Dorsalis pedis pulses are 1+ on the right side and 1+ on the left side.        Posterior tibial pulses are 1+ on the right side and 1+ on the left side.      Comments: Toes warm, pink, cap fill <5 seconds.    Trace edema both pretibial surfaces right and left.  Musculoskeletal:      Comments: Crossover 2nd digit left.   Lymphadenopathy:      Comments: Negative lymphadenopathy bilateral popliteal fossa and tarsal tunnel.     Skin:     General: Skin is warm and dry.      Coloration: Skin is not pale.      Findings: No abrasion, bruising, burn, ecchymosis, erythema, laceration, lesion or rash.      Comments:     Wound:  Plantar 2nd toe base in the sulcus    Size:    Pre:  6 x 6 x 1 mm  Post:  10 x 9 x 2 mm    Base:  Granular, pink with moderate serous/serosanaguinous drainage only.  No pus, tracking, fluctuance, malodor, or cardinal signs infection.    Borders:  Hyperkeratotic, debriding to flat, pink, blanchable skin edges without  undermining.    Otherwise,Skin thin, shiny, atrophic, with decreased density and distribution of pedal hair bilateral, but without hyperpigmentation, minerva discoloration,  ulcers, masses, nodules or cords palpated bilateral feet and legs.        Neurological:      Sensory: Sensory deficit present.      Comments: Diminished/loss of protective sensation all toes bilateral to 10 gram monofilament.     Psychiatric:         Behavior: Behavior is cooperative.               Assessment:       Encounter Diagnoses   Name Primary?    Type II diabetes mellitus with neurological manifestations Yes    Ulcer of toe, left, with fat layer exposed          Plan:       Richard was seen today for foot problem.    Diagnoses and all orders for this visit:    Type II diabetes mellitus with neurological manifestations  -     Aerobic culture  -     Culture, Anaerobic  -     X-Ray Foot Complete Left; Future    Ulcer of toe, left, with fat layer exposed  -     Aerobic culture  -     Culture, Anaerobic  -     X-Ray Foot Complete Left; Future    Other orders  -     clindamycin (CLEOCIN) 300 MG capsule; Take 1 capsule (300 mg total) by mouth 3 (three) times daily. for 10 days      I counseled the patient on his conditions, their implications and medical management.        Debride wound left 2nd toe.      Dressed wound left 2nd toe with Hydrofera blue and football dressing.  Do not wear change.      Dispense surgical shoe left.  Ambulate minimally in same with diabetic shoe custom insert right.      X-rays, cultures, clindamycin.    One-week, sooner p.r.n.          No follow-ups on file.

## 2022-08-29 ENCOUNTER — PATIENT MESSAGE (OUTPATIENT)
Dept: PODIATRY | Facility: CLINIC | Age: 77
End: 2022-08-29
Payer: MEDICARE

## 2022-08-31 ENCOUNTER — OFFICE VISIT (OUTPATIENT)
Dept: PODIATRY | Facility: CLINIC | Age: 77
End: 2022-08-31
Payer: MEDICARE

## 2022-08-31 VITALS
SYSTOLIC BLOOD PRESSURE: 146 MMHG | BODY MASS INDEX: 39.1 KG/M2 | HEART RATE: 65 BPM | DIASTOLIC BLOOD PRESSURE: 75 MMHG | WEIGHT: 264 LBS | HEIGHT: 69 IN

## 2022-08-31 DIAGNOSIS — Z79.01 LONG TERM (CURRENT) USE OF ANTICOAGULANTS: ICD-10-CM

## 2022-08-31 DIAGNOSIS — I73.9 PVD (PERIPHERAL VASCULAR DISEASE): ICD-10-CM

## 2022-08-31 DIAGNOSIS — E11.49 TYPE II DIABETES MELLITUS WITH NEUROLOGICAL MANIFESTATIONS: ICD-10-CM

## 2022-08-31 DIAGNOSIS — L97.522 ULCER OF TOE, LEFT, WITH FAT LAYER EXPOSED: Primary | ICD-10-CM

## 2022-08-31 PROCEDURE — 3078F DIAST BP <80 MM HG: CPT | Mod: CPTII,S$GLB,, | Performed by: PODIATRIST

## 2022-08-31 PROCEDURE — 4010F ACE/ARB THERAPY RXD/TAKEN: CPT | Mod: CPTII,S$GLB,, | Performed by: PODIATRIST

## 2022-08-31 PROCEDURE — 1160F PR REVIEW ALL MEDS BY PRESCRIBER/CLIN PHARMACIST DOCUMENTED: ICD-10-PCS | Mod: CPTII,S$GLB,, | Performed by: PODIATRIST

## 2022-08-31 PROCEDURE — 3077F SYST BP >= 140 MM HG: CPT | Mod: CPTII,S$GLB,, | Performed by: PODIATRIST

## 2022-08-31 PROCEDURE — 3078F PR MOST RECENT DIASTOLIC BLOOD PRESSURE < 80 MM HG: ICD-10-PCS | Mod: CPTII,S$GLB,, | Performed by: PODIATRIST

## 2022-08-31 PROCEDURE — 1126F AMNT PAIN NOTED NONE PRSNT: CPT | Mod: CPTII,S$GLB,, | Performed by: PODIATRIST

## 2022-08-31 PROCEDURE — 1101F PT FALLS ASSESS-DOCD LE1/YR: CPT | Mod: CPTII,S$GLB,, | Performed by: PODIATRIST

## 2022-08-31 PROCEDURE — 3008F BODY MASS INDEX DOCD: CPT | Mod: CPTII,S$GLB,, | Performed by: PODIATRIST

## 2022-08-31 PROCEDURE — 99999 PR PBB SHADOW E&M-EST. PATIENT-LVL IV: CPT | Mod: PBBFAC,,, | Performed by: PODIATRIST

## 2022-08-31 PROCEDURE — 99999 PR PBB SHADOW E&M-EST. PATIENT-LVL IV: ICD-10-PCS | Mod: PBBFAC,,, | Performed by: PODIATRIST

## 2022-08-31 PROCEDURE — 1126F PR PAIN SEVERITY QUANTIFIED, NO PAIN PRESENT: ICD-10-PCS | Mod: CPTII,S$GLB,, | Performed by: PODIATRIST

## 2022-08-31 PROCEDURE — 3044F HG A1C LEVEL LT 7.0%: CPT | Mod: CPTII,S$GLB,, | Performed by: PODIATRIST

## 2022-08-31 PROCEDURE — 4010F PR ACE/ARB THEARPY RXD/TAKEN: ICD-10-PCS | Mod: CPTII,S$GLB,, | Performed by: PODIATRIST

## 2022-08-31 PROCEDURE — 3288F FALL RISK ASSESSMENT DOCD: CPT | Mod: CPTII,S$GLB,, | Performed by: PODIATRIST

## 2022-08-31 PROCEDURE — 3008F PR BODY MASS INDEX (BMI) DOCUMENTED: ICD-10-PCS | Mod: CPTII,S$GLB,, | Performed by: PODIATRIST

## 2022-08-31 PROCEDURE — 99214 PR OFFICE/OUTPT VISIT, EST, LEVL IV, 30-39 MIN: ICD-10-PCS | Mod: 25,S$GLB,, | Performed by: PODIATRIST

## 2022-08-31 PROCEDURE — 3044F PR MOST RECENT HEMOGLOBIN A1C LEVEL <7.0%: ICD-10-PCS | Mod: CPTII,S$GLB,, | Performed by: PODIATRIST

## 2022-08-31 PROCEDURE — 1160F RVW MEDS BY RX/DR IN RCRD: CPT | Mod: CPTII,S$GLB,, | Performed by: PODIATRIST

## 2022-08-31 PROCEDURE — 3077F PR MOST RECENT SYSTOLIC BLOOD PRESSURE >= 140 MM HG: ICD-10-PCS | Mod: CPTII,S$GLB,, | Performed by: PODIATRIST

## 2022-08-31 PROCEDURE — 1159F PR MEDICATION LIST DOCUMENTED IN MEDICAL RECORD: ICD-10-PCS | Mod: CPTII,S$GLB,, | Performed by: PODIATRIST

## 2022-08-31 PROCEDURE — 1101F PR PT FALLS ASSESS DOC 0-1 FALLS W/OUT INJ PAST YR: ICD-10-PCS | Mod: CPTII,S$GLB,, | Performed by: PODIATRIST

## 2022-08-31 PROCEDURE — 3288F PR FALLS RISK ASSESSMENT DOCUMENTED: ICD-10-PCS | Mod: CPTII,S$GLB,, | Performed by: PODIATRIST

## 2022-08-31 PROCEDURE — 99214 OFFICE O/P EST MOD 30 MIN: CPT | Mod: 25,S$GLB,, | Performed by: PODIATRIST

## 2022-08-31 PROCEDURE — 1159F MED LIST DOCD IN RCRD: CPT | Mod: CPTII,S$GLB,, | Performed by: PODIATRIST

## 2022-08-31 NOTE — PROGRESS NOTES
Subjective:      Patient ID: Richard Bustos is a 76 y.o. male.    Chief Complaint: Wound Check (Toe wound)    Aching redness left 2nd toe with reported open wound.  Unknown duration, daughter discovered last week.  Improving some  Aggravated with pressure shoes ambulation.  Woundcare, offloading, antibiotics helping well.  Xrays negative osteolysis 2nd toe left, cultures pending, taking clindamycin with good improvemethn.    Review of Systems   Constitutional: Negative for chills, fever, malaise/fatigue and night sweats.   Cardiovascular:  Positive for leg swelling. Negative for claudication and cyanosis.   Skin:  Positive for poor wound healing. Negative for color change, itching, rash and suspicious lesions.   Musculoskeletal:  Negative for falls, gout, joint pain and myalgias.   Neurological:  Positive for numbness and sensory change.         Objective:      Physical Exam  Constitutional:       General: He is not in acute distress.     Appearance: He is well-developed. He is not diaphoretic.   Cardiovascular:      Pulses:           Dorsalis pedis pulses are 1+ on the right side and 1+ on the left side.        Posterior tibial pulses are 1+ on the right side and 1+ on the left side.      Comments: Toes warm, pink, cap fill <5 seconds.    Trace edema both pretibial surfaces right and left.  Musculoskeletal:      Comments: Crossover 2nd digit left.   Lymphadenopathy:      Comments: Negative lymphadenopathy bilateral popliteal fossa and tarsal tunnel.     Skin:     General: Skin is warm and dry.      Coloration: Skin is not pale.      Findings: No abrasion, bruising, burn, ecchymosis, erythema, laceration, lesion or rash.      Comments:     Wound:  Plantar 2nd toe base in the sulcus    Size:    Pre:  5x4 x 1 mm  Post:  7x6 x 2 mm    Base:  Granular, pink with moderate serous/serosanaguinous drainage only.  No pus, tracking, fluctuance, malodor, or cardinal signs infection.    Borders:  Hyperkeratotic, debriding to  flat, pink, blanchable skin edges without undermining.    Otherwise,Skin thin, shiny, atrophic, with decreased density and distribution of pedal hair bilateral, but without hyperpigmentation, minerva discoloration,  ulcers, masses, nodules or cords palpated bilateral feet and legs.        Neurological:      Sensory: Sensory deficit present.      Comments: Diminished/loss of protective sensation all toes bilateral to 10 gram monofilament.     Psychiatric:         Behavior: Behavior is cooperative.             Assessment:       No diagnosis found.        Plan:       There are no diagnoses linked to this encounter.    I counseled the patient on his conditions, their implications and medical management.        Debride wound left 2nd toe.      Dressed wound left 2nd toe with nicole and football dressing.  Do not wet or change.      Continue surgical shoe left.  Ambulate minimally in same with diabetic shoe custom insert right.      One-week, sooner p.r.n.          No follow-ups on file.

## 2022-09-02 LAB — BACTERIA SPEC AEROBE CULT: ABNORMAL

## 2022-09-04 ENCOUNTER — PATIENT MESSAGE (OUTPATIENT)
Dept: PODIATRY | Facility: CLINIC | Age: 77
End: 2022-09-04
Payer: MEDICARE

## 2022-09-05 LAB — BACTERIA SPEC ANAEROBE CULT: NORMAL

## 2022-09-08 ENCOUNTER — OFFICE VISIT (OUTPATIENT)
Dept: PODIATRY | Facility: CLINIC | Age: 77
End: 2022-09-08
Payer: MEDICARE

## 2022-09-08 VITALS
BODY MASS INDEX: 39.1 KG/M2 | HEART RATE: 58 BPM | DIASTOLIC BLOOD PRESSURE: 73 MMHG | HEIGHT: 69 IN | WEIGHT: 264 LBS | SYSTOLIC BLOOD PRESSURE: 143 MMHG

## 2022-09-08 DIAGNOSIS — I73.9 PVD (PERIPHERAL VASCULAR DISEASE): ICD-10-CM

## 2022-09-08 DIAGNOSIS — Z79.01 LONG TERM (CURRENT) USE OF ANTICOAGULANTS: ICD-10-CM

## 2022-09-08 DIAGNOSIS — E11.49 TYPE II DIABETES MELLITUS WITH NEUROLOGICAL MANIFESTATIONS: ICD-10-CM

## 2022-09-08 DIAGNOSIS — L97.522 ULCER OF TOE, LEFT, WITH FAT LAYER EXPOSED: Primary | ICD-10-CM

## 2022-09-08 PROCEDURE — 3288F FALL RISK ASSESSMENT DOCD: CPT | Mod: CPTII,S$GLB,, | Performed by: PODIATRIST

## 2022-09-08 PROCEDURE — 1101F PT FALLS ASSESS-DOCD LE1/YR: CPT | Mod: CPTII,S$GLB,, | Performed by: PODIATRIST

## 2022-09-08 PROCEDURE — 3078F PR MOST RECENT DIASTOLIC BLOOD PRESSURE < 80 MM HG: ICD-10-PCS | Mod: CPTII,S$GLB,, | Performed by: PODIATRIST

## 2022-09-08 PROCEDURE — 99999 PR PBB SHADOW E&M-EST. PATIENT-LVL V: ICD-10-PCS | Mod: PBBFAC,,, | Performed by: PODIATRIST

## 2022-09-08 PROCEDURE — 4010F PR ACE/ARB THEARPY RXD/TAKEN: ICD-10-PCS | Mod: CPTII,S$GLB,, | Performed by: PODIATRIST

## 2022-09-08 PROCEDURE — 1159F MED LIST DOCD IN RCRD: CPT | Mod: CPTII,S$GLB,, | Performed by: PODIATRIST

## 2022-09-08 PROCEDURE — 3044F PR MOST RECENT HEMOGLOBIN A1C LEVEL <7.0%: ICD-10-PCS | Mod: CPTII,S$GLB,, | Performed by: PODIATRIST

## 2022-09-08 PROCEDURE — 3078F DIAST BP <80 MM HG: CPT | Mod: CPTII,S$GLB,, | Performed by: PODIATRIST

## 2022-09-08 PROCEDURE — 3077F SYST BP >= 140 MM HG: CPT | Mod: CPTII,S$GLB,, | Performed by: PODIATRIST

## 2022-09-08 PROCEDURE — 1101F PR PT FALLS ASSESS DOC 0-1 FALLS W/OUT INJ PAST YR: ICD-10-PCS | Mod: CPTII,S$GLB,, | Performed by: PODIATRIST

## 2022-09-08 PROCEDURE — 1126F PR PAIN SEVERITY QUANTIFIED, NO PAIN PRESENT: ICD-10-PCS | Mod: CPTII,S$GLB,, | Performed by: PODIATRIST

## 2022-09-08 PROCEDURE — 3077F PR MOST RECENT SYSTOLIC BLOOD PRESSURE >= 140 MM HG: ICD-10-PCS | Mod: CPTII,S$GLB,, | Performed by: PODIATRIST

## 2022-09-08 PROCEDURE — 99999 PR PBB SHADOW E&M-EST. PATIENT-LVL V: CPT | Mod: PBBFAC,,, | Performed by: PODIATRIST

## 2022-09-08 PROCEDURE — 4010F ACE/ARB THERAPY RXD/TAKEN: CPT | Mod: CPTII,S$GLB,, | Performed by: PODIATRIST

## 2022-09-08 PROCEDURE — 99213 PR OFFICE/OUTPT VISIT, EST, LEVL III, 20-29 MIN: ICD-10-PCS | Mod: S$GLB,,, | Performed by: PODIATRIST

## 2022-09-08 PROCEDURE — 3288F PR FALLS RISK ASSESSMENT DOCUMENTED: ICD-10-PCS | Mod: CPTII,S$GLB,, | Performed by: PODIATRIST

## 2022-09-08 PROCEDURE — 99213 OFFICE O/P EST LOW 20 MIN: CPT | Mod: S$GLB,,, | Performed by: PODIATRIST

## 2022-09-08 PROCEDURE — 1126F AMNT PAIN NOTED NONE PRSNT: CPT | Mod: CPTII,S$GLB,, | Performed by: PODIATRIST

## 2022-09-08 PROCEDURE — 1159F PR MEDICATION LIST DOCUMENTED IN MEDICAL RECORD: ICD-10-PCS | Mod: CPTII,S$GLB,, | Performed by: PODIATRIST

## 2022-09-08 PROCEDURE — 3044F HG A1C LEVEL LT 7.0%: CPT | Mod: CPTII,S$GLB,, | Performed by: PODIATRIST

## 2022-09-08 NOTE — PROGRESS NOTES
Subjective:      Patient ID: Richard Bustos is a 77 y.o. male.    Chief Complaint: Toe Pain (Toe wound)    Aching redness left 2nd toe with reported open wound.  Unknown duration.  Improving some  Aggravated with pressure shoes ambulation.  Woundcare, offloading, antibiotics helping well.  Xrays negative osteolysis 2nd toe left, cultures pending, finished clindamycin with good improvemethn.    Review of Systems   Constitutional: Negative for chills, fever, malaise/fatigue and night sweats.   Cardiovascular:  Positive for leg swelling. Negative for claudication and cyanosis.   Skin:  Positive for poor wound healing. Negative for color change, itching, rash and suspicious lesions.   Musculoskeletal:  Negative for falls, gout, joint pain and myalgias.   Neurological:  Positive for numbness and sensory change.         Objective:      Physical Exam  Constitutional:       General: He is not in acute distress.     Appearance: He is well-developed. He is not diaphoretic.   Cardiovascular:      Pulses:           Dorsalis pedis pulses are 1+ on the right side and 1+ on the left side.        Posterior tibial pulses are 1+ on the right side and 1+ on the left side.      Comments: Toes warm, pink, cap fill <5 seconds.    Trace edema both pretibial surfaces right and left.  Musculoskeletal:      Comments: Crossover 2nd digit left.   Lymphadenopathy:      Comments: Negative lymphadenopathy bilateral popliteal fossa and tarsal tunnel.     Skin:     General: Skin is warm and dry.      Coloration: Skin is not pale.      Findings: No abrasion, bruising, burn, ecchymosis, erythema, laceration, lesion or rash.      Comments:     Wound:  Plantar 2nd toe base in the sulcus    Size:    Pre:  [id number]    Base:  Granular, pink with moderate serous/serosanaguinous drainage only.  No pus, tracking, fluctuance, malodor, or cardinal signs infection.    Borders:  Hyperkeratotic, debriding to flat, pink, blanchable skin edges without  undermining.    Otherwise,Skin thin, shiny, atrophic, with decreased density and distribution of pedal hair bilateral, but without hyperpigmentation, minerva discoloration,  ulcers, masses, nodules or cords palpated bilateral feet and legs.        Neurological:      Sensory: Sensory deficit present.      Comments: Diminished/loss of protective sensation all toes bilateral to 10 gram monofilament.     Psychiatric:         Behavior: Behavior is cooperative.             Assessment:       Encounter Diagnoses   Name Primary?    Ulcer of toe, left, with fat layer exposed Yes    Type II diabetes mellitus with neurological manifestations     Long term (current) use of anticoagulants     PVD (peripheral vascular disease)            Plan:       Richard was seen today for toe pain.    Diagnoses and all orders for this visit:    Ulcer of toe, left, with fat layer exposed    Type II diabetes mellitus with neurological manifestations    Long term (current) use of anticoagulants    PVD (peripheral vascular disease)      I counseled the patient on his conditions, their implications and medical management.            Dressed wound left 2nd toe with hydrofera blue and football dressing.  Do not wet or change.      Continue surgical shoe left.  Ambulate minimally in same with diabetic shoe custom insert right.      One-week, sooner p.r.n.          Follow up in about 1 week (around 9/15/2022).

## 2022-09-15 ENCOUNTER — OFFICE VISIT (OUTPATIENT)
Dept: PODIATRY | Facility: CLINIC | Age: 77
End: 2022-09-15
Payer: MEDICARE

## 2022-09-15 VITALS
WEIGHT: 256.94 LBS | HEART RATE: 60 BPM | HEIGHT: 69 IN | SYSTOLIC BLOOD PRESSURE: 134 MMHG | DIASTOLIC BLOOD PRESSURE: 70 MMHG | BODY MASS INDEX: 38.06 KG/M2

## 2022-09-15 DIAGNOSIS — E11.49 TYPE II DIABETES MELLITUS WITH NEUROLOGICAL MANIFESTATIONS: Primary | ICD-10-CM

## 2022-09-15 DIAGNOSIS — Z79.01 LONG TERM (CURRENT) USE OF ANTICOAGULANTS: ICD-10-CM

## 2022-09-15 DIAGNOSIS — I73.9 PVD (PERIPHERAL VASCULAR DISEASE): ICD-10-CM

## 2022-09-15 DIAGNOSIS — Z87.898 HISTORY OF ULCERATION: ICD-10-CM

## 2022-09-15 PROCEDURE — 4010F PR ACE/ARB THEARPY RXD/TAKEN: ICD-10-PCS | Mod: CPTII,S$GLB,, | Performed by: PODIATRIST

## 2022-09-15 PROCEDURE — 99999 PR PBB SHADOW E&M-EST. PATIENT-LVL IV: CPT | Mod: PBBFAC,,, | Performed by: PODIATRIST

## 2022-09-15 PROCEDURE — 3075F SYST BP GE 130 - 139MM HG: CPT | Mod: CPTII,S$GLB,, | Performed by: PODIATRIST

## 2022-09-15 PROCEDURE — 1159F PR MEDICATION LIST DOCUMENTED IN MEDICAL RECORD: ICD-10-PCS | Mod: CPTII,S$GLB,, | Performed by: PODIATRIST

## 2022-09-15 PROCEDURE — 1101F PR PT FALLS ASSESS DOC 0-1 FALLS W/OUT INJ PAST YR: ICD-10-PCS | Mod: CPTII,S$GLB,, | Performed by: PODIATRIST

## 2022-09-15 PROCEDURE — 3078F PR MOST RECENT DIASTOLIC BLOOD PRESSURE < 80 MM HG: ICD-10-PCS | Mod: CPTII,S$GLB,, | Performed by: PODIATRIST

## 2022-09-15 PROCEDURE — 3075F PR MOST RECENT SYSTOLIC BLOOD PRESS GE 130-139MM HG: ICD-10-PCS | Mod: CPTII,S$GLB,, | Performed by: PODIATRIST

## 2022-09-15 PROCEDURE — 99213 OFFICE O/P EST LOW 20 MIN: CPT | Mod: S$GLB,,, | Performed by: PODIATRIST

## 2022-09-15 PROCEDURE — 1101F PT FALLS ASSESS-DOCD LE1/YR: CPT | Mod: CPTII,S$GLB,, | Performed by: PODIATRIST

## 2022-09-15 PROCEDURE — 3044F HG A1C LEVEL LT 7.0%: CPT | Mod: CPTII,S$GLB,, | Performed by: PODIATRIST

## 2022-09-15 PROCEDURE — 3044F PR MOST RECENT HEMOGLOBIN A1C LEVEL <7.0%: ICD-10-PCS | Mod: CPTII,S$GLB,, | Performed by: PODIATRIST

## 2022-09-15 PROCEDURE — 99213 PR OFFICE/OUTPT VISIT, EST, LEVL III, 20-29 MIN: ICD-10-PCS | Mod: S$GLB,,, | Performed by: PODIATRIST

## 2022-09-15 PROCEDURE — 1126F AMNT PAIN NOTED NONE PRSNT: CPT | Mod: CPTII,S$GLB,, | Performed by: PODIATRIST

## 2022-09-15 PROCEDURE — 99999 PR PBB SHADOW E&M-EST. PATIENT-LVL IV: ICD-10-PCS | Mod: PBBFAC,,, | Performed by: PODIATRIST

## 2022-09-15 PROCEDURE — 3078F DIAST BP <80 MM HG: CPT | Mod: CPTII,S$GLB,, | Performed by: PODIATRIST

## 2022-09-15 PROCEDURE — 1126F PR PAIN SEVERITY QUANTIFIED, NO PAIN PRESENT: ICD-10-PCS | Mod: CPTII,S$GLB,, | Performed by: PODIATRIST

## 2022-09-15 PROCEDURE — 3288F PR FALLS RISK ASSESSMENT DOCUMENTED: ICD-10-PCS | Mod: CPTII,S$GLB,, | Performed by: PODIATRIST

## 2022-09-15 PROCEDURE — 4010F ACE/ARB THERAPY RXD/TAKEN: CPT | Mod: CPTII,S$GLB,, | Performed by: PODIATRIST

## 2022-09-15 PROCEDURE — 3288F FALL RISK ASSESSMENT DOCD: CPT | Mod: CPTII,S$GLB,, | Performed by: PODIATRIST

## 2022-09-15 PROCEDURE — 1159F MED LIST DOCD IN RCRD: CPT | Mod: CPTII,S$GLB,, | Performed by: PODIATRIST

## 2022-09-15 NOTE — PROGRESS NOTES
Subjective:      Patient ID: Richard Bustos is a 77 y.o. male.    Chief Complaint: Wound Check (Toe wound )    Aching redness left 2nd toe with reported open wound.  Unknown duration.  Improving well.   Aggravated with pressure shoes ambulation.  Woundcare, offloading, antibiotics helping well.  Xrays negative osteolysis 2nd toe left,     Review of Systems   Constitutional: Negative for chills, fever, malaise/fatigue and night sweats.   Cardiovascular:  Positive for leg swelling. Negative for claudication and cyanosis.   Skin:  Positive for poor wound healing. Negative for color change, itching, rash and suspicious lesions.   Musculoskeletal:  Negative for falls, gout, joint pain and myalgias.   Neurological:  Positive for numbness and sensory change.         Objective:      Physical Exam  Constitutional:       General: He is not in acute distress.     Appearance: He is well-developed. He is not diaphoretic.   Cardiovascular:      Pulses:           Dorsalis pedis pulses are 1+ on the right side and 1+ on the left side.        Posterior tibial pulses are 1+ on the right side and 1+ on the left side.      Comments: Toes warm, pink, cap fill <5 seconds.    Trace edema both pretibial surfaces right and left.  Musculoskeletal:      Comments: Crossover 2nd digit left.   Lymphadenopathy:      Comments: Negative lymphadenopathy bilateral popliteal fossa and tarsal tunnel.     Skin:     General: Skin is warm and dry.      Coloration: Skin is not pale.      Findings: No abrasion, bruising, burn, ecchymosis, erythema, laceration, lesion or rash.      Comments:     Wound plantar left 2nd toe base/sulcus closed today, epithelialized  without ulceration, drainage, pus, tracking, fluctuance, malodor, or cardinal signs infection.    Skin thin, shiny, atrophic, with decreased density and distribution of pedal hair bilateral, but without hyperpigmentation, minerva discoloration,  ulcers, masses, nodules or cords palpated bilateral feet  and legs.        Neurological:      Sensory: Sensory deficit present.      Comments: Diminished/loss of protective sensation all toes bilateral to 10 gram monofilament.     Psychiatric:         Behavior: Behavior is cooperative.             Assessment:       Encounter Diagnoses   Name Primary?    Type II diabetes mellitus with neurological manifestations Yes    Long term (current) use of anticoagulants     PVD (peripheral vascular disease)     History of ulceration            Plan:       Richard was seen today for wound check.    Diagnoses and all orders for this visit:    Type II diabetes mellitus with neurological manifestations    Long term (current) use of anticoagulants    PVD (peripheral vascular disease)    History of ulceration      I counseled the patient on his conditions, their implications and medical management.            Tube foam 2nd toe left has prevented ulcer return well in the past months at a time. - continue same.    Patient may return to DM shoes, custom inserts with activity to tolerance.    Inspect feet multiple times daily for signs of occurrence/recurrence ulceration.  Present here, urgent care, emergency room immediately if wound reopens.    Prn.          No follow-ups on file.

## 2022-09-27 ENCOUNTER — TELEPHONE (OUTPATIENT)
Dept: HEMATOLOGY/ONCOLOGY | Facility: CLINIC | Age: 77
End: 2022-09-27

## 2022-09-27 DIAGNOSIS — D63.1 ANEMIA DUE TO STAGE 3B CHRONIC KIDNEY DISEASE: Primary | ICD-10-CM

## 2022-09-27 DIAGNOSIS — C61 PROSTATE CANCER: ICD-10-CM

## 2022-09-27 DIAGNOSIS — N18.32 ANEMIA DUE TO STAGE 3B CHRONIC KIDNEY DISEASE: Primary | ICD-10-CM

## 2022-09-27 NOTE — TELEPHONE ENCOUNTER
Spoke to daughter and made aware that patient will need labs done prior to scheduled appt. Daughter requested that lab orders be sent [facility]. Orders placed to [facility].

## 2022-09-28 ENCOUNTER — OFFICE VISIT (OUTPATIENT)
Dept: PODIATRY | Facility: CLINIC | Age: 77
End: 2022-09-28
Payer: MEDICARE

## 2022-09-28 VITALS
HEIGHT: 69 IN | WEIGHT: 258.94 LBS | BODY MASS INDEX: 38.35 KG/M2 | HEART RATE: 56 BPM | DIASTOLIC BLOOD PRESSURE: 65 MMHG | SYSTOLIC BLOOD PRESSURE: 174 MMHG

## 2022-09-28 DIAGNOSIS — Z79.01 LONG TERM (CURRENT) USE OF ANTICOAGULANTS: ICD-10-CM

## 2022-09-28 DIAGNOSIS — L97.522 ULCER OF TOE, LEFT, WITH FAT LAYER EXPOSED: Primary | ICD-10-CM

## 2022-09-28 DIAGNOSIS — I73.9 PVD (PERIPHERAL VASCULAR DISEASE): ICD-10-CM

## 2022-09-28 DIAGNOSIS — E11.49 TYPE II DIABETES MELLITUS WITH NEUROLOGICAL MANIFESTATIONS: ICD-10-CM

## 2022-09-28 PROCEDURE — 99999 PR PBB SHADOW E&M-EST. PATIENT-LVL IV: ICD-10-PCS | Mod: PBBFAC,,, | Performed by: PODIATRIST

## 2022-09-28 PROCEDURE — 3077F PR MOST RECENT SYSTOLIC BLOOD PRESSURE >= 140 MM HG: ICD-10-PCS | Mod: CPTII,S$GLB,, | Performed by: PODIATRIST

## 2022-09-28 PROCEDURE — 3077F SYST BP >= 140 MM HG: CPT | Mod: CPTII,S$GLB,, | Performed by: PODIATRIST

## 2022-09-28 PROCEDURE — 1100F PR PT FALLS ASSESS DOC 2+ FALLS/FALL W/INJURY/YR: ICD-10-PCS | Mod: CPTII,S$GLB,, | Performed by: PODIATRIST

## 2022-09-28 PROCEDURE — 1100F PTFALLS ASSESS-DOCD GE2>/YR: CPT | Mod: CPTII,S$GLB,, | Performed by: PODIATRIST

## 2022-09-28 PROCEDURE — 1125F PR PAIN SEVERITY QUANTIFIED, PAIN PRESENT: ICD-10-PCS | Mod: CPTII,S$GLB,, | Performed by: PODIATRIST

## 2022-09-28 PROCEDURE — 3078F PR MOST RECENT DIASTOLIC BLOOD PRESSURE < 80 MM HG: ICD-10-PCS | Mod: CPTII,S$GLB,, | Performed by: PODIATRIST

## 2022-09-28 PROCEDURE — 4010F ACE/ARB THERAPY RXD/TAKEN: CPT | Mod: CPTII,S$GLB,, | Performed by: PODIATRIST

## 2022-09-28 PROCEDURE — 3288F PR FALLS RISK ASSESSMENT DOCUMENTED: ICD-10-PCS | Mod: CPTII,S$GLB,, | Performed by: PODIATRIST

## 2022-09-28 PROCEDURE — 99213 PR OFFICE/OUTPT VISIT, EST, LEVL III, 20-29 MIN: ICD-10-PCS | Mod: 25,S$GLB,, | Performed by: PODIATRIST

## 2022-09-28 PROCEDURE — 4010F PR ACE/ARB THEARPY RXD/TAKEN: ICD-10-PCS | Mod: CPTII,S$GLB,, | Performed by: PODIATRIST

## 2022-09-28 PROCEDURE — 1125F AMNT PAIN NOTED PAIN PRSNT: CPT | Mod: CPTII,S$GLB,, | Performed by: PODIATRIST

## 2022-09-28 PROCEDURE — 1159F PR MEDICATION LIST DOCUMENTED IN MEDICAL RECORD: ICD-10-PCS | Mod: CPTII,S$GLB,, | Performed by: PODIATRIST

## 2022-09-28 PROCEDURE — 3078F DIAST BP <80 MM HG: CPT | Mod: CPTII,S$GLB,, | Performed by: PODIATRIST

## 2022-09-28 PROCEDURE — 1159F MED LIST DOCD IN RCRD: CPT | Mod: CPTII,S$GLB,, | Performed by: PODIATRIST

## 2022-09-28 PROCEDURE — 3044F HG A1C LEVEL LT 7.0%: CPT | Mod: CPTII,S$GLB,, | Performed by: PODIATRIST

## 2022-09-28 PROCEDURE — 3288F FALL RISK ASSESSMENT DOCD: CPT | Mod: CPTII,S$GLB,, | Performed by: PODIATRIST

## 2022-09-28 PROCEDURE — 99999 PR PBB SHADOW E&M-EST. PATIENT-LVL IV: CPT | Mod: PBBFAC,,, | Performed by: PODIATRIST

## 2022-09-28 PROCEDURE — 3044F PR MOST RECENT HEMOGLOBIN A1C LEVEL <7.0%: ICD-10-PCS | Mod: CPTII,S$GLB,, | Performed by: PODIATRIST

## 2022-09-28 PROCEDURE — 99213 OFFICE O/P EST LOW 20 MIN: CPT | Mod: 25,S$GLB,, | Performed by: PODIATRIST

## 2022-09-28 NOTE — PROGRESS NOTES
Subjective:      Patient ID: Richard Bustos is a 77 y.o. male.    Chief Complaint: Foot Ulcer (Left foot toe #2 wound open underneath)    Ulcer 2nd toe has returned.  Unknown exact onset, discovered 2 days ago.  Aggravated by increased weight-bearing prolonged standing some shoes.  Generally not worsening or improving.  Denies trauma and surgery left foot.    Review of Systems   Constitutional: Negative for chills, diaphoresis, fever, malaise/fatigue and night sweats.   Cardiovascular:  Positive for leg swelling. Negative for claudication, cyanosis and syncope.   Skin:  Positive for poor wound healing. Negative for color change, dry skin, nail changes, rash, suspicious lesions and unusual hair distribution.   Musculoskeletal:  Negative for falls, joint pain, joint swelling, muscle cramps, muscle weakness and stiffness.   Gastrointestinal:  Negative for constipation, diarrhea, nausea and vomiting.   Neurological:  Positive for numbness, paresthesias and sensory change. Negative for brief paralysis, disturbances in coordination, focal weakness and tremors.         Objective:      Physical Exam  Constitutional:       General: He is not in acute distress.     Appearance: He is well-developed. He is not diaphoretic.   Cardiovascular:      Pulses:           Popliteal pulses are 2+ on the right side and 2+ on the left side.        Dorsalis pedis pulses are 2+ on the right side and 2+ on the left side.        Posterior tibial pulses are 2+ on the right side and 2+ on the left side.      Comments: Capillary refill 3 seconds all toes/distal feet, all toes/both feet warm to touch.      Negative lymphadenopathy bilateral popliteal fossa and tarsal tunnel.      Negavie lower extremity edema bilateral.    Musculoskeletal:      Right ankle: No swelling, deformity, ecchymosis or lacerations. Normal range of motion. Normal pulse.      Right Achilles Tendon: Normal. No defects. Chung's test negative.      Comments: Crossover 2nd  toe left   Lymphadenopathy:      Lower Body: No right inguinal adenopathy. No left inguinal adenopathy.      Comments: Negative lymphadenopathy bilateral popliteal fossa and tarsal tunnel.    Negative lymphangitic streaking bilateral feet/ankles/legs.   Skin:     General: Skin is warm and dry.      Capillary Refill: Capillary refill takes 2 to 3 seconds.      Coloration: Skin is not pale.      Findings: No abrasion, bruising, burn, ecchymosis, erythema, laceration, lesion or rash.      Nails: There is no clubbing.      Comments:   Wound:  plantar left 2nd pipj    Size:    Pre:[id number]  Post: [id number]    Base:  Granular, pink with moderate serous/serosanaguinous drainage only.  No pus, tracking, fluctuance, malodor, or cardinal signs infection.    Borders:  Hyperkeratotic, debriding to flat, pink, blanchable skin edges without undermining.    Otherwise, Skin thin, shiny, atrophic, with decreased density and distribution of pedal hair bilateral, but without hyperpigmentation, minerva discoloration,  ulcers, masses, nodules or cords palpated bilateral feet and legs.    Neurological:      Mental Status: He is alert and oriented to person, place, and time.      Sensory: Sensory deficit present.      Motor: No tremor, atrophy or abnormal muscle tone.      Gait: Gait normal.      Comments: Diminished/loss of protective sensation all toes bilateral to 10 gram monofilament.     Psychiatric:         Behavior: Behavior is cooperative.           Assessment:       Encounter Diagnoses   Name Primary?    Ulcer of toe, left, with fat layer exposed Yes    Long term (current) use of anticoagulants     Type II diabetes mellitus with neurological manifestations     PVD (peripheral vascular disease)          Plan:       Richard was seen today for foot ulcer.    Diagnoses and all orders for this visit:    Ulcer of toe, left, with fat layer exposed    Long term (current) use of anticoagulants    Type II diabetes mellitus with neurological  manifestations    PVD (peripheral vascular disease)      I counseled the patient on his conditions, their implications and medical management.        Debrided wound 2nd toe left.      Dressed wound 2nd toe left with Jayde, wound foam, football dressing.  Do not change or wet.      Dispense surgical shoe left.  Diabetic shoe custom insert right.    Follow 1 week, sooner p.r.n.          Follow up in about 1 week (around 10/5/2022).

## 2022-09-28 NOTE — PROGRESS NOTES
[facility] Hematology/Oncology  PROGRESS NOTE      Subjective:       Patient ID:   NAME: Richard Bustos : 1945     77 y.o. male    Referring Doc: Ashley  Other Physicians: Bebeto Torrez Chamberlain    Chief Complaint:  Clot disorder f/u    History of Present Illness:     Patient returns today for a regularly scheduled follow-up visit.  The patient is doing ok overall per his report . He is here with his wife today    He has been under the care of wound care for the left foot stasis ulcers and Dr Torrez with podiatry and is improved per his report .  He saw Dr Torrez yesterday      He is breathing ok. He denies any current fever, CP, SOB, HA's or N/V.       He saw Dr Ramirez's NP couple of months ago    No excessive bleeding or bruising    Chronic aches and pains    Discussed covid19 precaution - he had his vaccinations and is planning to get the booster                ROS:   GEN: normal without any fever, night sweats or weight loss; chronic leg and foot issues  HEENT: normal with no HA's, sore throat, stiff neck, changes in vision  CV: normal with no CP, SOB, PND, THOMPSON or orthopnea  PULM: normal with no SOB, cough, hemoptysis, sputum or pleuritic pain  GI: normal with no abdominal pain, nausea, vomiting, constipation, diarrhea, melanotic stools, BRBPR, or hematemesis  : normal with no hematuria, dysuria  BREAST: normal with no mass, discharge, pain  SKIN: normal with no rash, erythema, bruising, or swelling    Allergies:  Review of patient's allergies indicates:   Allergen Reactions    Shellfish containing products Other (See Comments)     Other reaction(s): Gout       Medications:    Current Outpatient Medications:     albuterol (VENTOLIN HFA) 90 mcg/actuation inhaler, Inhale 2 puffs into the lungs every 6 (six) hours as needed for Wheezing or Shortness of Breath. Rescue, Disp: 1 g, Rfl: 2    allopurinoL (ZYLOPRIM) 100 MG tablet, Take 1 tablet (100 mg total) by mouth every evening., Disp: 90 tablet, Rfl: 3     "ammonium lactate 12 % Crea, Apply twice daily to affected parts both feet as needed., Disp: 140 g, Rfl: 11    aspirin (ECOTRIN) 81 MG EC tablet, Take 81 mg by mouth once daily., Disp: , Rfl:     atorvastatin (LIPITOR) 80 MG tablet, Take 1 tablet (80 mg total) by mouth once daily., Disp: 90 tablet, Rfl: 3    calcitRIOL (ROCALTROL) 0.5 MCG Cap, 0.75 mcg once daily. , Disp: , Rfl: 4    carvediloL (COREG) 25 MG tablet, Take 1 tablet (25 mg total) by mouth 2 (two) times daily with meals., Disp: 180 tablet, Rfl: 3    ciclopirox 0.77 % Gel, Apply topically 2 (two) times daily., Disp: 100 g, Rfl: 3    clopidogreL (PLAVIX) 75 mg tablet, Take 1 tablet (75 mg total) by mouth once daily., Disp: 90 tablet, Rfl: 3    famotidine (PEPCID) 40 MG tablet, Take 1 tablet (40 mg total) by mouth once daily., Disp: 90 tablet, Rfl: 3    ferrous sulfate (FEROSUL) 325 mg (65 mg iron) Tab tablet, TAKE 1 TABLET BY MOUTH TWICE DAILY, Disp: 180 tablet, Rfl: 3    fluticasone propionate (FLONASE) 50 mcg/actuation nasal spray, SHAKE LIQUID AND USE 2 SPRAYS(100 MCG) IN EACH NOSTRIL EVERY DAY, Disp: 16 g, Rfl: 5    foam bandage 4 X 4 " Bndg, Change every 3 days until healed, Disp: 5 each, Rfl: 0    FOLIC ACID/MULTIVIT-MIN/LUTEIN (CENTRUM SILVER ORAL), Take 1 tablet by mouth once daily., Disp: , Rfl:     HYDROcodone-acetaminophen (NORCO) 5-325 mg per tablet, Take 1 tablet by mouth every 8 (eight) hours as needed for Pain. Medically necessary for greater than 7 days for chronic pain, Disp: 90 tablet, Rfl: 0    hydrocortisone 2.5 % cream, Apply topically 2 (two) times daily., Disp: 1 Tube, Rfl: 0    ipratropium (ATROVENT) 42 mcg (0.06 %) nasal spray, 42 sprays by Nasal route., Disp: , Rfl:     LIDOcaine HCL 2% (XYLOCAINE) 2 % jelly, Apply topically as needed. Apply topically once nightly to affected part of foot/feet., Disp: 30 mL, Rfl: 2    lisinopriL (PRINIVIL,ZESTRIL) 40 MG tablet, Take 1 tablet (40 mg total) by mouth every evening., Disp: 90 tablet, " "Rfl: 3    magnesium oxide (MAG-OX) 400 mg (241.3 mg magnesium) tablet, Take 1 tablet (400 mg total) by mouth once daily., Disp: 90 tablet, Rfl: 3    mecobal-levomefolat Ca-B6 phos (L-METHYL-B6-B12) 3-35-2 mg Tab, Take 1 tablet by mouth 2 (two) times daily., Disp: 180 tablet, Rfl: 3    mirabegron (MYRBETRIQ) 50 mg Tb24, Take 1 tablet (50 mg total) by mouth once daily., Disp: 90 tablet, Rfl: 3    nitroGLYCERIN 0.4 MG/DOSE TL SPRY (NITROLINGUAL) 400 mcg/spray spray, PLACE 1 SPRAY UNDER THE TONGUE EVERY 5 MINUTES AS NEEDED FOR CHEST PAIN, Disp: 4.9 g, Rfl: 9    nystatin (MYCOSTATIN) powder, Apply topically 2 (two) times daily., Disp: 60 g, Rfl: 11    omeprazole (PRILOSEC) 20 MG capsule, Take 1 capsule (20 mg total) by mouth once daily., Disp: 90 capsule, Rfl: 3    prothrombin time/INR test metr Misc, 1 Stick by Misc.(Non-Drug; Combo Route) route every 30 days., Disp: 1 each, Rfl: 0    traZODone (DESYREL) 50 MG tablet, Take 1 tablet (50 mg total) by mouth every evening., Disp: 90 tablet, Rfl: 3    triamcinolone acetonide 0.1% (KENALOG) 0.1 % cream, APPLY TO THE AFFECTED AREA(S) TWICE DAILY, Disp: 80 g, Rfl: 3    vit A/vit C/vit E/zinc/copper (PRESERVISION AREDS ORAL), Take by mouth 2 (two) times a day. , Disp: , Rfl:     warfarin (COUMADIN) 5 MG tablet, 5 mg except on wednesday and saturday Wed and sat 2.5 mgs, Disp: 90 tablet, Rfl: 3  No current facility-administered medications for this visit.    Facility-Administered Medications Ordered in Other Visits:     lactated ringers infusion, , Intravenous, Once PRN, Evangelist Bose MD    PMHx/PSHx Updates:  See patient's last visit with me on 3/30/2022  See H&P on 4/9/2014        Pathology:  Cancer Staging  No matching staging information was found for the patient.          Objective:     Vitals:  Blood pressure (!) 161/85, pulse 69, temperature 97.5 °F (36.4 °C), resp. rate 18, height 5' 9" (1.753 m), weight 118.8 kg (262 lb).    Physical Examination:   GEN: no apparent " distress, comfortable; AAOx3; overweight  HEAD: atraumatic and normocephalic  EYES: no pallor, no icterus, PERRLA  ENT: OMM, no pharyngeal erythema, external ears WNL; no nasal discharge; no thrush  NECK: no masses, thyroid normal, trachea midline, no LAD/LN's, supple  CV: RRR with no murmur; normal pulse; normal S1 and S2; no pedal edema  CHEST: Normal respiratory effort; CTAB; normal breath sounds; no wheeze or crackles  ABDOM: nontender and nondistended; soft; normal bowel sounds; no rebound/guarding; overweight  MUSC/Skeletal: ROM normal; no crepitus; joints normal; no deformities or arthropathy; uses cane/walker to walk  EXTREM: erythematous changes on bilateral lower extremities;feet are better per patient; left foot in boot    SKIN: no rashes, petechiae or subcutaneous nodules; prior skin cancer on face s/p cream (resolved); chronic skin changes  : no mcdaniels  NEURO: grossly intact; motor/sensory WNL; AAOx3; no tremors  PSYCH: normal mood, affect and behavior  LYMPH: normal cervical, supraclavicular, axillary and groin LN's            Labs:          Lab Results   Component Value Date    WBC 9.16 03/14/2022    HGB 10.9 (L) 03/14/2022    HCT 34.2 (L) 03/14/2022     (H) 03/14/2022     03/14/2022          BMP  Lab Results   Component Value Date     08/08/2022    K 4.8 08/08/2022     (H) 08/08/2022    CO2 22 (L) 08/08/2022    BUN 28 (H) 08/08/2022    CREATININE 1.6 (H) 08/08/2022    CALCIUM 8.8 08/08/2022    ANIONGAP 7 (L) 08/08/2022    ESTGFRAFRICA 41.9 (A) 06/14/2022    EGFRNONAA 36.3 (A) 06/14/2022          Lab Results   Component Value Date    ALT 25 03/14/2022    AST 28 03/14/2022    ALKPHOS 102 03/14/2022    BILITOT 0.4 03/14/2022        Lab Results   Component Value Date    INR 1.9 09/26/2022    INR 1.9 09/19/2022    INR 1.9 09/12/2022     Lab Results   Component Value Date    IRON 83 03/14/2022    TIBC 164 (L) 03/14/2022    FERRITIN 1,015 (H) 03/14/2022          Radiology/Diagnostic Studies:    No results found.    I have reviewed all available lab results and radiology reports.    Assessment/Plan:   (1) 77 y.o. male with diagnosis of chronic clot disorder with underlying MTHFR issues  - he was previously under the care of Dr Krunal Rodriguez with hematology at [facility]  - he has been on coumadin therapy per direction of Dr Tate with cardiology  - he has been having it adjusted by cardiology  - latest INR was 2.4 - followed and under direction of care by Dr Tate    2/25/2021:  - latest INR adequate at 2.3    9/29/2021:  - latest INR at 2.1    3/30/2022:  - INR at 2.8  - followed by Dr Tate    9/29/2022:  - INR at 1.9 on 9/26/2022  - managed by coumadin clinic      (2) CAD - followed by Dr Tate    (3) Abdominal hematoma in past     (4) CRI - followed by Nephrology (Amy?)    (5) Chronic multifactorial anemia with underlying anemia of chronic disorders and chronic renal disease; chronic GIB  - latest hgb at  11.0 and relatively stable  - he is on oral iron  - latest iron panel pending still    2/25/2021:  - latest hgb at 10.2 and a little lower  -  iron is adequate 67    9/29/2021:  - latest hgb 10.3 and relatively stable  - iron was 74    3/30/2022:  - hgb 10.9 and adequate for him    9/29/2022:  - needs up to date labs    (6) Diabetic ulcer/ left toe issues - followed by Dr Torrez - improved per patient    (7) Urology following for prostate - Dr Moss        1. Anemia due to stage 3b chronic kidney disease        2. H/O bariatric surgery, 2008        3. MTHFR mutation (methylenetetrahydrofolate reductase)        4. Hypercoagulable state, Prothrombin mutation        5. Long term (current) use of anticoagulants        6. Warfarin anticoagulation              PLAN:  1. Check labs every 3 months (due for CBC and iron panel) - encouraged compliance  2. F/u with PCP, card, and neph  3. Continue coumadin per [facility] coumadin clinic direction  4. RTC in 6 months    Fax note to  Jone Ramirez    Discussion:       COVID-19 Discussion:    I had long discussion with patient and any applicable family about the COVID-19 coronavirus epidemic and the recommended precautions with regard to cancer and/or hematology patients. I have re-iterated the CDC recommendations for adequate hand washing, use of hand -like products, and coughing into elbow, etc. In addition, especially for our patients who are on chemotherapy and/or our otherwise immunocompromised patients, I have recommended avoidance of crowds, including movie theaters, restaurants, churches, etc. I have recommended avoidance of any sick or symptomatic family members and/or friends. I have also recommended avoidance of any raw and unwashed food products, and general avoidance of food items that have not been prepared by themselves. The patient has been asked to call us immediately with any symptom developments, issues, questions or other general concerns.       Anticoagulation Discussion:    Discussed with patient and any applicable family members about the benefit and/or need for anticoagulation. I communicated about the risks of bleeding while on any anticoagulation, which could be serious and/or life-threatening, and which can occur at any time, regardless of degree of the level of anticoagulation. I expressed the need for compliance with any anticoagulation regimen and that failure to do so could potential lead to excessive bleeding, and risk to health and/or life. In particular, with patients on coumadin therapy, compliance with requested blood work is absolutely essential, as coumadin levels can vary from time to time, and failure to do so could potentially place the patient at risk for bleeding and/or clotting events which could be fatal. Patients on coumadin are encouraged to call the day after they have their levels drawn, as to obtain the appropriate instructions from my staff. Patients are aware that  self-regulating or self-dosing of their medications is strictly prohibited.       I have explained all of the above in detail and the patient understands all of the current recommendation(s). I have answered all of their questions to the best of my ability and to their complete satisfaction.   The patient is to continue with the current management plan.            Electronically signed by Kai Ortiz MD

## 2022-09-29 ENCOUNTER — OFFICE VISIT (OUTPATIENT)
Dept: HEMATOLOGY/ONCOLOGY | Facility: CLINIC | Age: 77
End: 2022-09-29
Payer: MEDICARE

## 2022-09-29 VITALS
WEIGHT: 262 LBS | DIASTOLIC BLOOD PRESSURE: 85 MMHG | HEART RATE: 69 BPM | HEIGHT: 69 IN | BODY MASS INDEX: 38.8 KG/M2 | RESPIRATION RATE: 18 BRPM | TEMPERATURE: 98 F | SYSTOLIC BLOOD PRESSURE: 161 MMHG

## 2022-09-29 DIAGNOSIS — D53.9 NUTRITIONAL ANEMIA, UNSPECIFIED: ICD-10-CM

## 2022-09-29 DIAGNOSIS — N18.32 ANEMIA DUE TO STAGE 3B CHRONIC KIDNEY DISEASE: Primary | ICD-10-CM

## 2022-09-29 DIAGNOSIS — Z79.01 LONG TERM (CURRENT) USE OF ANTICOAGULANTS: ICD-10-CM

## 2022-09-29 DIAGNOSIS — Z98.84 H/O BARIATRIC SURGERY: ICD-10-CM

## 2022-09-29 DIAGNOSIS — D63.1 ANEMIA DUE TO STAGE 3B CHRONIC KIDNEY DISEASE: Primary | ICD-10-CM

## 2022-09-29 DIAGNOSIS — Z79.01 WARFARIN ANTICOAGULATION: ICD-10-CM

## 2022-09-29 DIAGNOSIS — Z15.89 MTHFR MUTATION (METHYLENETETRAHYDROFOLATE REDUCTASE): ICD-10-CM

## 2022-09-29 DIAGNOSIS — D68.59 HYPERCOAGULABLE STATE: ICD-10-CM

## 2022-09-29 PROCEDURE — 3288F PR FALLS RISK ASSESSMENT DOCUMENTED: ICD-10-PCS | Mod: CPTII,S$GLB,, | Performed by: INTERNAL MEDICINE

## 2022-09-29 PROCEDURE — 1159F MED LIST DOCD IN RCRD: CPT | Mod: CPTII,S$GLB,, | Performed by: INTERNAL MEDICINE

## 2022-09-29 PROCEDURE — 1160F RVW MEDS BY RX/DR IN RCRD: CPT | Mod: CPTII,S$GLB,, | Performed by: INTERNAL MEDICINE

## 2022-09-29 PROCEDURE — 4010F ACE/ARB THERAPY RXD/TAKEN: CPT | Mod: CPTII,S$GLB,, | Performed by: INTERNAL MEDICINE

## 2022-09-29 PROCEDURE — 3079F PR MOST RECENT DIASTOLIC BLOOD PRESSURE 80-89 MM HG: ICD-10-PCS | Mod: CPTII,S$GLB,, | Performed by: INTERNAL MEDICINE

## 2022-09-29 PROCEDURE — 99213 PR OFFICE/OUTPT VISIT, EST, LEVL III, 20-29 MIN: ICD-10-PCS | Mod: S$GLB,,, | Performed by: INTERNAL MEDICINE

## 2022-09-29 PROCEDURE — 1125F PR PAIN SEVERITY QUANTIFIED, PAIN PRESENT: ICD-10-PCS | Mod: CPTII,S$GLB,, | Performed by: INTERNAL MEDICINE

## 2022-09-29 PROCEDURE — 1125F AMNT PAIN NOTED PAIN PRSNT: CPT | Mod: CPTII,S$GLB,, | Performed by: INTERNAL MEDICINE

## 2022-09-29 PROCEDURE — 3288F FALL RISK ASSESSMENT DOCD: CPT | Mod: CPTII,S$GLB,, | Performed by: INTERNAL MEDICINE

## 2022-09-29 PROCEDURE — 1159F PR MEDICATION LIST DOCUMENTED IN MEDICAL RECORD: ICD-10-PCS | Mod: CPTII,S$GLB,, | Performed by: INTERNAL MEDICINE

## 2022-09-29 PROCEDURE — 4010F PR ACE/ARB THEARPY RXD/TAKEN: ICD-10-PCS | Mod: CPTII,S$GLB,, | Performed by: INTERNAL MEDICINE

## 2022-09-29 PROCEDURE — 1160F PR REVIEW ALL MEDS BY PRESCRIBER/CLIN PHARMACIST DOCUMENTED: ICD-10-PCS | Mod: CPTII,S$GLB,, | Performed by: INTERNAL MEDICINE

## 2022-09-29 PROCEDURE — 99213 OFFICE O/P EST LOW 20 MIN: CPT | Mod: S$GLB,,, | Performed by: INTERNAL MEDICINE

## 2022-09-29 PROCEDURE — 3044F HG A1C LEVEL LT 7.0%: CPT | Mod: CPTII,S$GLB,, | Performed by: INTERNAL MEDICINE

## 2022-09-29 PROCEDURE — 1101F PT FALLS ASSESS-DOCD LE1/YR: CPT | Mod: CPTII,S$GLB,, | Performed by: INTERNAL MEDICINE

## 2022-09-29 PROCEDURE — 3077F PR MOST RECENT SYSTOLIC BLOOD PRESSURE >= 140 MM HG: ICD-10-PCS | Mod: CPTII,S$GLB,, | Performed by: INTERNAL MEDICINE

## 2022-09-29 PROCEDURE — 1101F PR PT FALLS ASSESS DOC 0-1 FALLS W/OUT INJ PAST YR: ICD-10-PCS | Mod: CPTII,S$GLB,, | Performed by: INTERNAL MEDICINE

## 2022-09-29 PROCEDURE — 3044F PR MOST RECENT HEMOGLOBIN A1C LEVEL <7.0%: ICD-10-PCS | Mod: CPTII,S$GLB,, | Performed by: INTERNAL MEDICINE

## 2022-09-29 PROCEDURE — 3079F DIAST BP 80-89 MM HG: CPT | Mod: CPTII,S$GLB,, | Performed by: INTERNAL MEDICINE

## 2022-09-29 PROCEDURE — 3077F SYST BP >= 140 MM HG: CPT | Mod: CPTII,S$GLB,, | Performed by: INTERNAL MEDICINE

## 2022-10-05 ENCOUNTER — OFFICE VISIT (OUTPATIENT)
Dept: PODIATRY | Facility: CLINIC | Age: 77
End: 2022-10-05
Payer: MEDICARE

## 2022-10-05 VITALS
HEIGHT: 69 IN | WEIGHT: 262 LBS | HEART RATE: 56 BPM | BODY MASS INDEX: 38.8 KG/M2 | DIASTOLIC BLOOD PRESSURE: 69 MMHG | SYSTOLIC BLOOD PRESSURE: 151 MMHG

## 2022-10-05 DIAGNOSIS — L97.522 ULCER OF TOE, LEFT, WITH FAT LAYER EXPOSED: Primary | ICD-10-CM

## 2022-10-05 DIAGNOSIS — Z79.01 LONG TERM (CURRENT) USE OF ANTICOAGULANTS: ICD-10-CM

## 2022-10-05 DIAGNOSIS — E11.9 TYPE 2 DIABETES MELLITUS WITHOUT COMPLICATION, UNSPECIFIED WHETHER LONG TERM INSULIN USE: ICD-10-CM

## 2022-10-05 DIAGNOSIS — E11.49 TYPE II DIABETES MELLITUS WITH NEUROLOGICAL MANIFESTATIONS: ICD-10-CM

## 2022-10-05 PROCEDURE — 99213 PR OFFICE/OUTPT VISIT, EST, LEVL III, 20-29 MIN: ICD-10-PCS | Mod: S$GLB,,, | Performed by: PODIATRIST

## 2022-10-05 PROCEDURE — 99213 OFFICE O/P EST LOW 20 MIN: CPT | Mod: S$GLB,,, | Performed by: PODIATRIST

## 2022-10-05 PROCEDURE — 3288F FALL RISK ASSESSMENT DOCD: CPT | Mod: CPTII,S$GLB,, | Performed by: PODIATRIST

## 2022-10-05 PROCEDURE — 3288F PR FALLS RISK ASSESSMENT DOCUMENTED: ICD-10-PCS | Mod: CPTII,S$GLB,, | Performed by: PODIATRIST

## 2022-10-05 PROCEDURE — 3077F PR MOST RECENT SYSTOLIC BLOOD PRESSURE >= 140 MM HG: ICD-10-PCS | Mod: CPTII,S$GLB,, | Performed by: PODIATRIST

## 2022-10-05 PROCEDURE — 3044F HG A1C LEVEL LT 7.0%: CPT | Mod: CPTII,S$GLB,, | Performed by: PODIATRIST

## 2022-10-05 PROCEDURE — 3078F PR MOST RECENT DIASTOLIC BLOOD PRESSURE < 80 MM HG: ICD-10-PCS | Mod: CPTII,S$GLB,, | Performed by: PODIATRIST

## 2022-10-05 PROCEDURE — 1126F AMNT PAIN NOTED NONE PRSNT: CPT | Mod: CPTII,S$GLB,, | Performed by: PODIATRIST

## 2022-10-05 PROCEDURE — 99999 PR PBB SHADOW E&M-EST. PATIENT-LVL V: CPT | Mod: PBBFAC,,, | Performed by: PODIATRIST

## 2022-10-05 PROCEDURE — 3044F PR MOST RECENT HEMOGLOBIN A1C LEVEL <7.0%: ICD-10-PCS | Mod: CPTII,S$GLB,, | Performed by: PODIATRIST

## 2022-10-05 PROCEDURE — 1160F RVW MEDS BY RX/DR IN RCRD: CPT | Mod: CPTII,S$GLB,, | Performed by: PODIATRIST

## 2022-10-05 PROCEDURE — 3077F SYST BP >= 140 MM HG: CPT | Mod: CPTII,S$GLB,, | Performed by: PODIATRIST

## 2022-10-05 PROCEDURE — 4010F PR ACE/ARB THEARPY RXD/TAKEN: ICD-10-PCS | Mod: CPTII,S$GLB,, | Performed by: PODIATRIST

## 2022-10-05 PROCEDURE — 4010F ACE/ARB THERAPY RXD/TAKEN: CPT | Mod: CPTII,S$GLB,, | Performed by: PODIATRIST

## 2022-10-05 PROCEDURE — 1159F PR MEDICATION LIST DOCUMENTED IN MEDICAL RECORD: ICD-10-PCS | Mod: CPTII,S$GLB,, | Performed by: PODIATRIST

## 2022-10-05 PROCEDURE — 1160F PR REVIEW ALL MEDS BY PRESCRIBER/CLIN PHARMACIST DOCUMENTED: ICD-10-PCS | Mod: CPTII,S$GLB,, | Performed by: PODIATRIST

## 2022-10-05 PROCEDURE — 1101F PT FALLS ASSESS-DOCD LE1/YR: CPT | Mod: CPTII,S$GLB,, | Performed by: PODIATRIST

## 2022-10-05 PROCEDURE — 1101F PR PT FALLS ASSESS DOC 0-1 FALLS W/OUT INJ PAST YR: ICD-10-PCS | Mod: CPTII,S$GLB,, | Performed by: PODIATRIST

## 2022-10-05 PROCEDURE — 99999 PR PBB SHADOW E&M-EST. PATIENT-LVL V: ICD-10-PCS | Mod: PBBFAC,,, | Performed by: PODIATRIST

## 2022-10-05 PROCEDURE — 1159F MED LIST DOCD IN RCRD: CPT | Mod: CPTII,S$GLB,, | Performed by: PODIATRIST

## 2022-10-05 PROCEDURE — 3078F DIAST BP <80 MM HG: CPT | Mod: CPTII,S$GLB,, | Performed by: PODIATRIST

## 2022-10-05 PROCEDURE — 1126F PR PAIN SEVERITY QUANTIFIED, NO PAIN PRESENT: ICD-10-PCS | Mod: CPTII,S$GLB,, | Performed by: PODIATRIST

## 2022-10-05 NOTE — PROGRESS NOTES
Subjective:      Patient ID: Richard Bustos is a 77 y.o. male.    Chief Complaint: Wound Check    Ulcer 2nd toe has returned.  Unknown exact onset, discovered 2 days ago.  Aggravated by increased weight-bearing prolonged standing some shoes.  Generally not worsening or improving.  Denies trauma and surgery left foot.    Hx DM, anticoagulation, PAD, PVD    Review of Systems   Constitutional: Negative for chills, diaphoresis, fever, malaise/fatigue and night sweats.   Cardiovascular:  Positive for leg swelling. Negative for claudication, cyanosis and syncope.   Skin:  Positive for poor wound healing. Negative for color change, dry skin, nail changes, rash, suspicious lesions and unusual hair distribution.   Musculoskeletal:  Negative for falls, joint pain, joint swelling, muscle cramps, muscle weakness and stiffness.   Gastrointestinal:  Negative for constipation, diarrhea, nausea and vomiting.   Neurological:  Positive for numbness, paresthesias and sensory change. Negative for brief paralysis, disturbances in coordination, focal weakness and tremors.         Objective:      Physical Exam  Constitutional:       General: He is not in acute distress.     Appearance: He is well-developed. He is not diaphoretic.   Cardiovascular:      Pulses:           Popliteal pulses are 2+ on the right side and 2+ on the left side.        Dorsalis pedis pulses are 2+ on the right side and 2+ on the left side.        Posterior tibial pulses are 2+ on the right side and 2+ on the left side.      Comments: Capillary refill 3 seconds all toes/distal feet, all toes/both feet warm to touch.      Negative lymphadenopathy bilateral popliteal fossa and tarsal tunnel.      Negavie lower extremity edema bilateral.    Musculoskeletal:      Right ankle: No swelling, deformity, ecchymosis or lacerations. Normal range of motion. Normal pulse.      Right Achilles Tendon: Normal. No defects. Chung's test negative.      Comments: Crossover 2nd toe  left   Lymphadenopathy:      Lower Body: No right inguinal adenopathy. No left inguinal adenopathy.      Comments: Negative lymphadenopathy bilateral popliteal fossa and tarsal tunnel.    Negative lymphangitic streaking bilateral feet/ankles/legs.   Skin:     General: Skin is warm and dry.      Capillary Refill: Capillary refill takes 2 to 3 seconds.      Coloration: Skin is not pale.      Findings: No abrasion, bruising, burn, ecchymosis, erythema, laceration, lesion or rash.      Nails: There is no clubbing.      Comments:   Wound:  plantar left 2nd pipj    Size:    Pre:[id number]    Base:  Granular, pink with moderate serous/serosanaguinous drainage only.  No pus, tracking, fluctuance, malodor, or cardinal signs infection.    Borders:  Hyperkeratotic, debriding to flat, pink, blanchable skin edges without undermining.    Otherwise, Skin thin, shiny, atrophic, with decreased density and distribution of pedal hair bilateral, but without hyperpigmentation, minerva discoloration,  ulcers, masses, nodules or cords palpated bilateral feet and legs.    Neurological:      Mental Status: He is alert and oriented to person, place, and time.      Sensory: Sensory deficit present.      Motor: No tremor, atrophy or abnormal muscle tone.      Gait: Gait normal.      Deep Tendon Reflexes:      Reflex Scores:       Patellar reflexes are 2+ on the right side and 2+ on the left side.       Achilles reflexes are 2+ on the right side and 2+ on the left side.     Comments: Diminished/loss of protective sensation all toes bilateral to 10 gram monofilament.     Psychiatric:         Behavior: Behavior is cooperative.           Assessment:       Encounter Diagnoses   Name Primary?    Ulcer of toe, left, with fat layer exposed Yes    Type II diabetes mellitus with neurological manifestations     Long term (current) use of anticoagulants          Plan:       Richard was seen today for wound check.    Diagnoses and all orders for this  visit:    Ulcer of toe, left, with fat layer exposed    Type II diabetes mellitus with neurological manifestations    Long term (current) use of anticoagulants    I counseled the patient on his conditions, their implications and medical management.             Dressed wound 2nd toe left with Jayde, wound foam, football dressing.  Do not change or wet.      Dispense surgical shoe left.  Diabetic shoe custom insert right.    Follow 1 week, sooner p.r.n.          No follow-ups on file.

## 2022-10-07 ENCOUNTER — OFFICE VISIT (OUTPATIENT)
Dept: PAIN MEDICINE | Facility: CLINIC | Age: 77
End: 2022-10-07
Payer: MEDICARE

## 2022-10-07 VITALS
WEIGHT: 262 LBS | SYSTOLIC BLOOD PRESSURE: 135 MMHG | BODY MASS INDEX: 38.8 KG/M2 | HEART RATE: 62 BPM | DIASTOLIC BLOOD PRESSURE: 64 MMHG | HEIGHT: 69 IN

## 2022-10-07 DIAGNOSIS — M48.00 CENTRAL STENOSIS OF SPINAL CANAL: ICD-10-CM

## 2022-10-07 DIAGNOSIS — M25.561 CHRONIC PAIN OF BOTH KNEES: ICD-10-CM

## 2022-10-07 DIAGNOSIS — M25.562 CHRONIC PAIN OF BOTH KNEES: ICD-10-CM

## 2022-10-07 DIAGNOSIS — G89.29 CHRONIC PAIN OF BOTH KNEES: ICD-10-CM

## 2022-10-07 DIAGNOSIS — M47.896 OTHER SPONDYLOSIS, LUMBAR REGION: Primary | ICD-10-CM

## 2022-10-07 DIAGNOSIS — F11.90 CHRONIC, CONTINUOUS USE OF OPIOIDS: ICD-10-CM

## 2022-10-07 DIAGNOSIS — M17.12 PRIMARY OSTEOARTHRITIS OF LEFT KNEE: ICD-10-CM

## 2022-10-07 PROCEDURE — 1159F PR MEDICATION LIST DOCUMENTED IN MEDICAL RECORD: ICD-10-PCS | Mod: CPTII,S$GLB,, | Performed by: PHYSICIAN ASSISTANT

## 2022-10-07 PROCEDURE — 99214 PR OFFICE/OUTPT VISIT, EST, LEVL IV, 30-39 MIN: ICD-10-PCS | Mod: S$GLB,,, | Performed by: PHYSICIAN ASSISTANT

## 2022-10-07 PROCEDURE — 1125F AMNT PAIN NOTED PAIN PRSNT: CPT | Mod: CPTII,S$GLB,, | Performed by: PHYSICIAN ASSISTANT

## 2022-10-07 PROCEDURE — 3044F PR MOST RECENT HEMOGLOBIN A1C LEVEL <7.0%: ICD-10-PCS | Mod: CPTII,S$GLB,, | Performed by: PHYSICIAN ASSISTANT

## 2022-10-07 PROCEDURE — 3078F PR MOST RECENT DIASTOLIC BLOOD PRESSURE < 80 MM HG: ICD-10-PCS | Mod: CPTII,S$GLB,, | Performed by: PHYSICIAN ASSISTANT

## 2022-10-07 PROCEDURE — 1101F PT FALLS ASSESS-DOCD LE1/YR: CPT | Mod: CPTII,S$GLB,, | Performed by: PHYSICIAN ASSISTANT

## 2022-10-07 PROCEDURE — 3044F HG A1C LEVEL LT 7.0%: CPT | Mod: CPTII,S$GLB,, | Performed by: PHYSICIAN ASSISTANT

## 2022-10-07 PROCEDURE — 99999 PR PBB SHADOW E&M-EST. PATIENT-LVL IV: ICD-10-PCS | Mod: PBBFAC,,, | Performed by: PHYSICIAN ASSISTANT

## 2022-10-07 PROCEDURE — 1159F MED LIST DOCD IN RCRD: CPT | Mod: CPTII,S$GLB,, | Performed by: PHYSICIAN ASSISTANT

## 2022-10-07 PROCEDURE — 99214 OFFICE O/P EST MOD 30 MIN: CPT | Mod: S$GLB,,, | Performed by: PHYSICIAN ASSISTANT

## 2022-10-07 PROCEDURE — 80326 AMPHETAMINES 5 OR MORE: CPT | Performed by: PHYSICIAN ASSISTANT

## 2022-10-07 PROCEDURE — 1101F PR PT FALLS ASSESS DOC 0-1 FALLS W/OUT INJ PAST YR: ICD-10-PCS | Mod: CPTII,S$GLB,, | Performed by: PHYSICIAN ASSISTANT

## 2022-10-07 PROCEDURE — 1125F PR PAIN SEVERITY QUANTIFIED, PAIN PRESENT: ICD-10-PCS | Mod: CPTII,S$GLB,, | Performed by: PHYSICIAN ASSISTANT

## 2022-10-07 PROCEDURE — 4010F PR ACE/ARB THEARPY RXD/TAKEN: ICD-10-PCS | Mod: CPTII,S$GLB,, | Performed by: PHYSICIAN ASSISTANT

## 2022-10-07 PROCEDURE — 3288F FALL RISK ASSESSMENT DOCD: CPT | Mod: CPTII,S$GLB,, | Performed by: PHYSICIAN ASSISTANT

## 2022-10-07 PROCEDURE — 3075F SYST BP GE 130 - 139MM HG: CPT | Mod: CPTII,S$GLB,, | Performed by: PHYSICIAN ASSISTANT

## 2022-10-07 PROCEDURE — 3078F DIAST BP <80 MM HG: CPT | Mod: CPTII,S$GLB,, | Performed by: PHYSICIAN ASSISTANT

## 2022-10-07 PROCEDURE — 3075F PR MOST RECENT SYSTOLIC BLOOD PRESS GE 130-139MM HG: ICD-10-PCS | Mod: CPTII,S$GLB,, | Performed by: PHYSICIAN ASSISTANT

## 2022-10-07 PROCEDURE — 3288F PR FALLS RISK ASSESSMENT DOCUMENTED: ICD-10-PCS | Mod: CPTII,S$GLB,, | Performed by: PHYSICIAN ASSISTANT

## 2022-10-07 PROCEDURE — 80355 GABAPENTIN NON-BLOOD: CPT | Performed by: PHYSICIAN ASSISTANT

## 2022-10-07 PROCEDURE — 4010F ACE/ARB THERAPY RXD/TAKEN: CPT | Mod: CPTII,S$GLB,, | Performed by: PHYSICIAN ASSISTANT

## 2022-10-07 PROCEDURE — 99999 PR PBB SHADOW E&M-EST. PATIENT-LVL IV: CPT | Mod: PBBFAC,,, | Performed by: PHYSICIAN ASSISTANT

## 2022-10-07 NOTE — PROGRESS NOTES
FOLLOW UP NOTE:     CHIEF COMPLAINT: left knee pain, lower back pain      Interval history:  Richard Bustos is a 77 y.o. male with chronic left knee pain who presents today for f/u s/p left monovisc in July. Has provided >90% relief. He is s/p bilateral L3,4 , 5 MB RFA  11 months ago with 50% relief.   He takes Norco 5 mg very q.8 hours as needed with moderate benefit.  Denies side effects with medication.  Denies any worsening weakness.  No bowel bladder changes.  Pain today is rated 10/10.    Prior HPI: Richard Bustos is a 77 y.o. male with PMH significant for CAD s/p PCI (ASA and Plavix), atrial fibrllation on coumadin, hx of bilateral hip replacements, CKD, HTN, and DORA presents as an established patient for the continued management of back and right hip pain. Today, the patient is not complaining primarily of back pain. The patient is reporting of right hip pain with associated groin pain today. The patient reports that this began approximately 1 month ago. The patient reports that he has a prior history of a hematoma that he is concerned about given his relatively new onset right hip pain. He was seen by orthopedics, Dr. Ríos and underwent a CT guided drainage.  Hip pain is much improved.  The patient denies of any significant changes in his health since his last appointment. The patient also denies of any changes in the character of his pain since his last appointment. The patient reports of benefit on his current pain regimen. Patient denies of any urinary/fecal incontinence, saddle anesthesia, or weakness.     INTERVENTIONAL PAIN HISTORY:  11/23/21: B/L L3, 4, 5 MB RFA 50% relief  10/19/2020, 5/2021: Left knee intra-articular steroid injection   9/21/2020: Lumbar interlaminar epidural steroid injection at L5-S1  6/22/2020: L5-S1 lumbar interlaminar epidural steroid injection - at least 50% relief  2/17/2020: Left knee intra-articular knee injection - good relief  1/10/2020: Radiofrequency ablation of the  "L3, L4, L5 medial branch nerves on the bilateral-side - 50% relief  8/3/2017: RFA at L3, 4, 5 - reports 80% relief    CURRENT PAIN MEDICATIONS:   Norco 5-325 mg PO BID/TID     Tried in the past:   Tramadol 50 mg PO BID for pain - mild benefit     ROS:  Review of Systems   Constitutional: Negative for chills and fever.   HENT: Negative for tinnitus.    Eyes: Negative for visual disturbance.   Respiratory: Negative for shortness of breath.    Cardiovascular: Negative for chest pain.   Gastrointestinal: Negative for nausea and vomiting.   Genitourinary: Negative for difficulty urinating.   Musculoskeletal: Positive for arthralgias and back pain.   Skin: Negative for rash.   Allergic/Immunologic: Negative for immunocompromised state.   Neurological: Negative for syncope.   Hematological: Does not bruise/bleed easily.   Psychiatric/Behavioral: Negative for suicidal ideas.        MEDICAL, SURGICAL, FAMILY, SOCIAL HX: reviewed    MEDICATIONS/ALLERGIES: reviewed    PHYSICAL EXAM:    VITALS: Vitals reviewed.   Vitals:    10/07/22 1029   BP: 135/64   Pulse: 62   Weight: 118.8 kg (262 lb)   Height: 5' 9" (1.753 m)   PainSc: 10-Worst pain ever   PainLoc: Back       Physical Exam  Vitals and nursing note reviewed.   Constitutional:       Appearance: He is not diaphoretic.   HENT:      Head: Normocephalic and atraumatic.   Eyes:      General:         Right eye: No discharge.         Left eye: No discharge.      Conjunctiva/sclera: Conjunctivae normal.   Cardiovascular:      Rate and Rhythm: RRR  Pulmonary:      Effort: Pulmonary effort is normal. No respiratory distress.      Breath sounds: Normal breath sounds.   Abdominal:      Palpations: Abdomen is soft.   Musculoskeletal:      Lumbar back: Tenderness present. Negative right straight leg raise test and negative left straight leg raise test.   Skin:     General: Skin is warm and dry.      Findings: No rash.   Neurological:      Mental Status: He is alert and oriented to " person, place, and time.   Psychiatric:         Mood and Affect: Mood and affect normal.         Cognition and Memory: Memory normal.         Judgment: Judgment normal.          UPPER EXTREMITIES: Normal alignment, normal range of motion, no atrophy, no skin changes,  hair growth and nail growth normal and equal bilaterally. No swelling, no tenderness.    LOWER EXTREMITIES:  midline joint tenderness bilateral patella. + crepitus bilaterally. No laxity noted   LUMBAR SPINE  Lumbar spine: ROM is significantly limited with flexion extension and oblique extension with increased pain.    Supine straight leg raise: unable to perform secondary to fall risk   ((+)) Facet loading bilaterally  Internal and external rotation of the hips: unable to perform secondary to fall risk  Myofascial exam: No tenderness to palpation across lumbar paraspinous muscles.     MOTOR: Tone and bulk: normal bilateral upper and lower Strength: normal   Delt      Bi         Tri        WE      WF                        R          5          5          5          5          5          5            L          5          5          5          5          5          5               IP         ADD     ABD     Quad   TA        Gas      HAM  R          5          5          5          5          5          5          5  L          5          5          5          5          5          5          5     SENSATION: Light touch and pinprick intact bilaterally  REFLEXES: normal, symmetric, nonbrisk.  Toes down, no clonus. Negative roca's sign bilaterally.  GAIT: normal rise, base, steps, and arm swing.      IMAGING: no new imaging to review    ASSESSMENT: Richard Bustos is a 75 y.o. male with PMH significant for CAD s/p PCI (ASA and Plavix), atrial fibrllation on coumadin, hx of bilateral hip replacements, CKD, HTN, and DORA presents as an established patient for the continued management of back knee and right hip pain. No recent imaging of the right hip  to review today. Treatment plan outlined below.   1. Other spondylosis, lumbar region    2. Primary osteoarthritis of left knee    3. Chronic pain of both knees    4. Central stenosis of spinal canal      PLAN:  1. I have stressed the importance of physical activity and exercise to improve overall health  2. Reviewed pertinent imaging and records with patient  3  Patient with significant benefit following Lumbar STARLA.  Patient will continue to monitor his progress.  May consider repeat procedure in future if pain returns or worsens.   4. Monitor progress from lumbar MB RFA at L3, 4, 5   5. Monitor progress from left knee monovisc today.    6. He can continue Norco 5mg q8hrs as needed. Scripts given last visit   reviewed.  Previous UDS consistent. UDS today.   7. Follow up 3 months.

## 2022-10-10 ENCOUNTER — PATIENT MESSAGE (OUTPATIENT)
Dept: ADMINISTRATIVE | Facility: HOSPITAL | Age: 77
End: 2022-10-10
Payer: MEDICARE

## 2022-10-11 ENCOUNTER — OFFICE VISIT (OUTPATIENT)
Dept: PODIATRY | Facility: CLINIC | Age: 77
End: 2022-10-11
Payer: MEDICARE

## 2022-10-11 VITALS
DIASTOLIC BLOOD PRESSURE: 76 MMHG | HEART RATE: 58 BPM | WEIGHT: 262 LBS | SYSTOLIC BLOOD PRESSURE: 141 MMHG | BODY MASS INDEX: 38.8 KG/M2 | HEIGHT: 69 IN

## 2022-10-11 DIAGNOSIS — E11.49 TYPE II DIABETES MELLITUS WITH NEUROLOGICAL MANIFESTATIONS: ICD-10-CM

## 2022-10-11 DIAGNOSIS — I73.9 PVD (PERIPHERAL VASCULAR DISEASE): ICD-10-CM

## 2022-10-11 DIAGNOSIS — L97.522 ULCER OF TOE, LEFT, WITH FAT LAYER EXPOSED: Primary | ICD-10-CM

## 2022-10-11 DIAGNOSIS — Z79.01 LONG TERM (CURRENT) USE OF ANTICOAGULANTS: ICD-10-CM

## 2022-10-11 PROCEDURE — 99499 UNLISTED E&M SERVICE: CPT | Mod: S$GLB,,, | Performed by: PODIATRIST

## 2022-10-11 PROCEDURE — 1125F PR PAIN SEVERITY QUANTIFIED, PAIN PRESENT: ICD-10-PCS | Mod: CPTII,S$GLB,, | Performed by: PODIATRIST

## 2022-10-11 PROCEDURE — 99499 NO LOS: ICD-10-PCS | Mod: S$GLB,,, | Performed by: PODIATRIST

## 2022-10-11 PROCEDURE — 3288F PR FALLS RISK ASSESSMENT DOCUMENTED: ICD-10-PCS | Mod: CPTII,S$GLB,, | Performed by: PODIATRIST

## 2022-10-11 PROCEDURE — 3077F PR MOST RECENT SYSTOLIC BLOOD PRESSURE >= 140 MM HG: ICD-10-PCS | Mod: CPTII,S$GLB,, | Performed by: PODIATRIST

## 2022-10-11 PROCEDURE — 1160F RVW MEDS BY RX/DR IN RCRD: CPT | Mod: CPTII,S$GLB,, | Performed by: PODIATRIST

## 2022-10-11 PROCEDURE — 3078F PR MOST RECENT DIASTOLIC BLOOD PRESSURE < 80 MM HG: ICD-10-PCS | Mod: CPTII,S$GLB,, | Performed by: PODIATRIST

## 2022-10-11 PROCEDURE — 3044F HG A1C LEVEL LT 7.0%: CPT | Mod: CPTII,S$GLB,, | Performed by: PODIATRIST

## 2022-10-11 PROCEDURE — 3288F FALL RISK ASSESSMENT DOCD: CPT | Mod: CPTII,S$GLB,, | Performed by: PODIATRIST

## 2022-10-11 PROCEDURE — 1101F PR PT FALLS ASSESS DOC 0-1 FALLS W/OUT INJ PAST YR: ICD-10-PCS | Mod: CPTII,S$GLB,, | Performed by: PODIATRIST

## 2022-10-11 PROCEDURE — 4010F ACE/ARB THERAPY RXD/TAKEN: CPT | Mod: CPTII,S$GLB,, | Performed by: PODIATRIST

## 2022-10-11 PROCEDURE — 1160F PR REVIEW ALL MEDS BY PRESCRIBER/CLIN PHARMACIST DOCUMENTED: ICD-10-PCS | Mod: CPTII,S$GLB,, | Performed by: PODIATRIST

## 2022-10-11 PROCEDURE — 99999 PR PBB SHADOW E&M-EST. PATIENT-LVL III: ICD-10-PCS | Mod: PBBFAC,,, | Performed by: PODIATRIST

## 2022-10-11 PROCEDURE — 3078F DIAST BP <80 MM HG: CPT | Mod: CPTII,S$GLB,, | Performed by: PODIATRIST

## 2022-10-11 PROCEDURE — 1125F AMNT PAIN NOTED PAIN PRSNT: CPT | Mod: CPTII,S$GLB,, | Performed by: PODIATRIST

## 2022-10-11 PROCEDURE — 11042 WOUND DEBRIDEMENT: ICD-10-PCS | Mod: S$GLB,,, | Performed by: PODIATRIST

## 2022-10-11 PROCEDURE — 11042 DBRDMT SUBQ TIS 1ST 20SQCM/<: CPT | Mod: S$GLB,,, | Performed by: PODIATRIST

## 2022-10-11 PROCEDURE — 1159F PR MEDICATION LIST DOCUMENTED IN MEDICAL RECORD: ICD-10-PCS | Mod: CPTII,S$GLB,, | Performed by: PODIATRIST

## 2022-10-11 PROCEDURE — 3044F PR MOST RECENT HEMOGLOBIN A1C LEVEL <7.0%: ICD-10-PCS | Mod: CPTII,S$GLB,, | Performed by: PODIATRIST

## 2022-10-11 PROCEDURE — 1159F MED LIST DOCD IN RCRD: CPT | Mod: CPTII,S$GLB,, | Performed by: PODIATRIST

## 2022-10-11 PROCEDURE — 1101F PT FALLS ASSESS-DOCD LE1/YR: CPT | Mod: CPTII,S$GLB,, | Performed by: PODIATRIST

## 2022-10-11 PROCEDURE — 3077F SYST BP >= 140 MM HG: CPT | Mod: CPTII,S$GLB,, | Performed by: PODIATRIST

## 2022-10-11 PROCEDURE — 99999 PR PBB SHADOW E&M-EST. PATIENT-LVL III: CPT | Mod: PBBFAC,,, | Performed by: PODIATRIST

## 2022-10-11 PROCEDURE — 4010F PR ACE/ARB THEARPY RXD/TAKEN: ICD-10-PCS | Mod: CPTII,S$GLB,, | Performed by: PODIATRIST

## 2022-10-11 NOTE — PROCEDURES
"Wound Debridement    Date/Time: 10/11/2022 9:09 AM  Performed by: Iván Torrez DPM  Authorized by: Iván Torrez DPM     Time out: Immediately prior to procedure a "time out" was called to verify the correct patient, procedure, equipment, support staff and site/side marked as required.    Consent Done?:  Yes (Verbal)  Local anesthesia used?: No      Wound Details:    Location:  Left foot    Location:  Left 2nd Toe    Type of Debridement:  Excisional       Length (cm):  0.4       Area (sq cm):  0.16       Width (cm):  0.4       Percent Debrided (%):  100       Depth (cm):  0.2       Total Area Debrided (sq cm):  0.16    Depth of debridement:  Subcutaneous tissue    Tissue debrided:  Dermis, Epidermis and Subcutaneous    Devitalized tissue debrided:  Biofilm and Slough    Instruments:  Curette    Bleeding:  Minimal  Hemostasis Achieved: Yes    Method Used:  Pressure  Patient tolerance:  Patient tolerated the procedure well with no immediate complications  "

## 2022-10-11 NOTE — PROGRESS NOTES
Subjective:      Patient ID: Richard Bustos is a 77 y.o. male.    Chief Complaint: Follow-up (Left foot 2nd digit wound underneath toe)    Ulcer 2nd toe left.  Stable, unchanging except size.  Aggravated by increased weight-bearing prolonged standing some shoes.  Generally not worsening or improving.  Denies trauma and surgery left foot.    Hx DM, anticoagulation, PAD, PVD    Review of Systems   Constitutional: Negative for chills, diaphoresis, fever, malaise/fatigue and night sweats.   Cardiovascular:  Positive for leg swelling. Negative for claudication, cyanosis and syncope.   Skin:  Positive for poor wound healing. Negative for color change, dry skin, nail changes, rash, suspicious lesions and unusual hair distribution.   Musculoskeletal:  Negative for falls, joint pain, joint swelling, muscle cramps, muscle weakness and stiffness.   Gastrointestinal:  Negative for constipation, diarrhea, nausea and vomiting.   Neurological:  Positive for numbness, paresthesias and sensory change. Negative for brief paralysis, disturbances in coordination, focal weakness and tremors.         Objective:      Physical Exam  Constitutional:       General: He is not in acute distress.     Appearance: He is well-developed. He is not diaphoretic.   Cardiovascular:      Pulses:           Popliteal pulses are 2+ on the right side and 2+ on the left side.        Dorsalis pedis pulses are 2+ on the right side and 2+ on the left side.        Posterior tibial pulses are 2+ on the right side and 2+ on the left side.      Comments: Capillary refill 3 seconds all toes/distal feet, all toes/both feet warm to touch.      Negative lymphadenopathy bilateral popliteal fossa and tarsal tunnel.      Negavie lower extremity edema bilateral.    Musculoskeletal:      Right ankle: No swelling, deformity, ecchymosis or lacerations. Normal range of motion. Normal pulse.      Right Achilles Tendon: Normal. No defects. Chung's test negative.      Comments:  Crossover 2nd toe left   Lymphadenopathy:      Lower Body: No right inguinal adenopathy. No left inguinal adenopathy.      Comments: Negative lymphadenopathy bilateral popliteal fossa and tarsal tunnel.    Negative lymphangitic streaking bilateral feet/ankles/legs.   Skin:     General: Skin is warm and dry.      Capillary Refill: Capillary refill takes 2 to 3 seconds.      Coloration: Skin is not pale.      Findings: No abrasion, bruising, burn, ecchymosis, erythema, laceration, lesion or rash.      Nails: There is no clubbing.      Comments:   Wound:  plantar left 2nd pipj    Size:    Pre:7z4l6mg  Post:  [id number]  Base:  Granular, pink with moderate serous/serosanaguinous drainage only.  No pus, tracking, fluctuance, malodor, or cardinal signs infection.    Borders:  atrophic, flat, pink, blanchable skin edges without undermining.    Otherwise, Skin thin, shiny, atrophic, with decreased density and distribution of pedal hair bilateral, but without hyperpigmentation, minerva discoloration,  ulcers, masses, nodules or cords palpated bilateral feet and legs.    Neurological:      Mental Status: He is alert and oriented to person, place, and time.      Sensory: Sensory deficit present.      Motor: No tremor, atrophy or abnormal muscle tone.      Gait: Gait normal.      Deep Tendon Reflexes:      Reflex Scores:       Patellar reflexes are 2+ on the right side and 2+ on the left side.       Achilles reflexes are 2+ on the right side and 2+ on the left side.     Comments: Diminished/loss of protective sensation all toes bilateral to 10 gram monofilament.     Psychiatric:         Behavior: Behavior is cooperative.           Assessment:       Encounter Diagnoses   Name Primary?    Ulcer of toe, left, with fat layer exposed Yes    Type II diabetes mellitus with neurological manifestations     Long term (current) use of anticoagulants     PVD (peripheral vascular disease)          Plan:       Richard was seen today for  follow-up.    Diagnoses and all orders for this visit:    Ulcer of toe, left, with fat layer exposed    Type II diabetes mellitus with neurological manifestations    Long term (current) use of anticoagulants    PVD (peripheral vascular disease)    I counseled the patient on his conditions, their implications and medical management.      Debride ulcer left 2nd toe.       Dressed wound 2nd toe left with hydrofera blue, football dressing.  Do not change or wet.      Dispense surgical shoe left.  Diabetic shoe custom insert right.    Follow 1 week, sooner p.r.n.          No follow-ups on file.

## 2022-10-12 ENCOUNTER — PATIENT MESSAGE (OUTPATIENT)
Dept: PAIN MEDICINE | Facility: CLINIC | Age: 77
End: 2022-10-12
Payer: MEDICARE

## 2022-10-13 ENCOUNTER — OFFICE VISIT (OUTPATIENT)
Dept: PODIATRY | Facility: CLINIC | Age: 77
End: 2022-10-13
Payer: MEDICARE

## 2022-10-13 VITALS
HEIGHT: 69 IN | BODY MASS INDEX: 38.8 KG/M2 | TEMPERATURE: 97 F | SYSTOLIC BLOOD PRESSURE: 130 MMHG | WEIGHT: 262 LBS | DIASTOLIC BLOOD PRESSURE: 62 MMHG | HEART RATE: 53 BPM

## 2022-10-13 DIAGNOSIS — E11.49 TYPE II DIABETES MELLITUS WITH NEUROLOGICAL MANIFESTATIONS: ICD-10-CM

## 2022-10-13 DIAGNOSIS — L02.612 ABSCESS OR CELLULITIS OF TOE, LEFT: ICD-10-CM

## 2022-10-13 DIAGNOSIS — M10.00 ACUTE IDIOPATHIC GOUT, UNSPECIFIED SITE: ICD-10-CM

## 2022-10-13 DIAGNOSIS — L03.032 ABSCESS OR CELLULITIS OF TOE, LEFT: ICD-10-CM

## 2022-10-13 DIAGNOSIS — L97.522 ULCER OF TOE, LEFT, WITH FAT LAYER EXPOSED: Primary | ICD-10-CM

## 2022-10-13 DIAGNOSIS — Z79.01 LONG TERM (CURRENT) USE OF ANTICOAGULANTS: ICD-10-CM

## 2022-10-13 DIAGNOSIS — I73.9 PVD (PERIPHERAL VASCULAR DISEASE): ICD-10-CM

## 2022-10-13 PROCEDURE — 4010F ACE/ARB THERAPY RXD/TAKEN: CPT | Mod: CPTII,S$GLB,, | Performed by: PODIATRIST

## 2022-10-13 PROCEDURE — 3078F DIAST BP <80 MM HG: CPT | Mod: CPTII,S$GLB,, | Performed by: PODIATRIST

## 2022-10-13 PROCEDURE — 3044F HG A1C LEVEL LT 7.0%: CPT | Mod: CPTII,S$GLB,, | Performed by: PODIATRIST

## 2022-10-13 PROCEDURE — 3044F PR MOST RECENT HEMOGLOBIN A1C LEVEL <7.0%: ICD-10-PCS | Mod: CPTII,S$GLB,, | Performed by: PODIATRIST

## 2022-10-13 PROCEDURE — 3075F SYST BP GE 130 - 139MM HG: CPT | Mod: CPTII,S$GLB,, | Performed by: PODIATRIST

## 2022-10-13 PROCEDURE — 99999 PR PBB SHADOW E&M-EST. PATIENT-LVL V: ICD-10-PCS | Mod: PBBFAC,,, | Performed by: PODIATRIST

## 2022-10-13 PROCEDURE — 1160F PR REVIEW ALL MEDS BY PRESCRIBER/CLIN PHARMACIST DOCUMENTED: ICD-10-PCS | Mod: CPTII,S$GLB,, | Performed by: PODIATRIST

## 2022-10-13 PROCEDURE — 3078F PR MOST RECENT DIASTOLIC BLOOD PRESSURE < 80 MM HG: ICD-10-PCS | Mod: CPTII,S$GLB,, | Performed by: PODIATRIST

## 2022-10-13 PROCEDURE — 1125F PR PAIN SEVERITY QUANTIFIED, PAIN PRESENT: ICD-10-PCS | Mod: CPTII,S$GLB,, | Performed by: PODIATRIST

## 2022-10-13 PROCEDURE — 3288F FALL RISK ASSESSMENT DOCD: CPT | Mod: CPTII,S$GLB,, | Performed by: PODIATRIST

## 2022-10-13 PROCEDURE — 3288F PR FALLS RISK ASSESSMENT DOCUMENTED: ICD-10-PCS | Mod: CPTII,S$GLB,, | Performed by: PODIATRIST

## 2022-10-13 PROCEDURE — 99213 OFFICE O/P EST LOW 20 MIN: CPT | Mod: S$GLB,,, | Performed by: PODIATRIST

## 2022-10-13 PROCEDURE — 99999 PR PBB SHADOW E&M-EST. PATIENT-LVL V: CPT | Mod: PBBFAC,,, | Performed by: PODIATRIST

## 2022-10-13 PROCEDURE — 1159F MED LIST DOCD IN RCRD: CPT | Mod: CPTII,S$GLB,, | Performed by: PODIATRIST

## 2022-10-13 PROCEDURE — 4010F PR ACE/ARB THEARPY RXD/TAKEN: ICD-10-PCS | Mod: CPTII,S$GLB,, | Performed by: PODIATRIST

## 2022-10-13 PROCEDURE — 1101F PT FALLS ASSESS-DOCD LE1/YR: CPT | Mod: CPTII,S$GLB,, | Performed by: PODIATRIST

## 2022-10-13 PROCEDURE — 1159F PR MEDICATION LIST DOCUMENTED IN MEDICAL RECORD: ICD-10-PCS | Mod: CPTII,S$GLB,, | Performed by: PODIATRIST

## 2022-10-13 PROCEDURE — 1125F AMNT PAIN NOTED PAIN PRSNT: CPT | Mod: CPTII,S$GLB,, | Performed by: PODIATRIST

## 2022-10-13 PROCEDURE — 1160F RVW MEDS BY RX/DR IN RCRD: CPT | Mod: CPTII,S$GLB,, | Performed by: PODIATRIST

## 2022-10-13 PROCEDURE — 99213 PR OFFICE/OUTPT VISIT, EST, LEVL III, 20-29 MIN: ICD-10-PCS | Mod: S$GLB,,, | Performed by: PODIATRIST

## 2022-10-13 PROCEDURE — 3075F PR MOST RECENT SYSTOLIC BLOOD PRESS GE 130-139MM HG: ICD-10-PCS | Mod: CPTII,S$GLB,, | Performed by: PODIATRIST

## 2022-10-13 PROCEDURE — 1101F PR PT FALLS ASSESS DOC 0-1 FALLS W/OUT INJ PAST YR: ICD-10-PCS | Mod: CPTII,S$GLB,, | Performed by: PODIATRIST

## 2022-10-13 RX ORDER — CLINDAMYCIN HYDROCHLORIDE 300 MG/1
300 CAPSULE ORAL 3 TIMES DAILY
Qty: 30 CAPSULE | Refills: 0 | Status: SHIPPED | OUTPATIENT
Start: 2022-10-13 | End: 2022-10-24 | Stop reason: ALTCHOICE

## 2022-10-13 NOTE — PROGRESS NOTES
Subjective:      Patient ID: Richard Bustos is a 77 y.o. male.    Chief Complaint: Foot Pain (Left foot pain)    Rapid onset redness pain swelling heat left forefoot.  Patient was seen earlier this week for wound care wound look better football dressing applied.  Denies trauma or other adverse events since last visit.    Review of Systems   Constitutional: Negative for chills, fever, malaise/fatigue and night sweats.   Cardiovascular:  Negative for claudication, cyanosis and leg swelling.   Skin:  Positive for poor wound healing. Negative for color change, itching, rash and suspicious lesions.   Musculoskeletal:  Negative for falls, gout, joint pain and myalgias.   Neurological:  Positive for numbness and sensory change.         Objective:      Physical Exam  Constitutional:       General: He is not in acute distress.     Appearance: He is well-developed. He is not diaphoretic.   Cardiovascular:      Pulses:           Dorsalis pedis pulses are 1+ on the right side and 1+ on the left side.        Posterior tibial pulses are 1+ on the right side and 1+ on the left side.      Comments: Toes warm, pink, cap fill <5 seconds.  Musculoskeletal:      Comments: Left 2nd toe was dorsal MTP contracture which is reducible hallux valgus deformity also reducible unchanged from baseline.   Lymphadenopathy:      Comments: Negative lymphadenopathy bilateral popliteal fossa and tarsal tunnel.     Skin:     General: Skin is warm and dry.      Coloration: Skin is not pale.      Findings: No abrasion, bruising, burn, ecchymosis, erythema, laceration, lesion or rash.      Comments: Wound at the plantar 2nd toe left foot is unchanged small with flat pink wound edges capillary refill to the digits left was immediate.      There is tense fullness with heat that is non fluctuant 2nd MTPJ which is painful to touch palpation range of motion.  Otherwise,  Skin is thin, and atrophied to lower extremity bilateral    Bright erythema of the 2nd toe  which is diffusely present through the bases of MTPs 1 2 and 3 left without sharply demarcated borders.     Neurological:      Sensory: Sensory deficit present.      Comments: Diminished/loss of protective sensation all toes bilateral to 10 gram monofilament.     Psychiatric:         Behavior: Behavior is cooperative.           Assessment:       Encounter Diagnoses   Name Primary?    Ulcer of toe, left, with fat layer exposed Yes    Type II diabetes mellitus with neurological manifestations     Long term (current) use of anticoagulants     PVD (peripheral vascular disease)          Plan:       Richard was seen today for foot pain.    Diagnoses and all orders for this visit:    Ulcer of toe, left, with fat layer exposed    Type II diabetes mellitus with neurological manifestations    Long term (current) use of anticoagulants    PVD (peripheral vascular disease)      I counseled the patient on his conditions, their implications and medical management.        MRI, x-ray, blood work.      Redressed the wound today.  Change with wound foam Kerlix wound net every other day.      Continue minimal ambulation surgical shoe left, diabetic shoe custom insert right.      Clindamycin.  Coverage in the absence of culturable tissue.    One week, sooner p.r.n..      Patient has been through this several times, we reiterated the need for vigilant monitoring and in the event this is not improved in a few days, and is present in the emergency room for admission consultation with Podiatry Infectious Disease more urgent imaging possible surgical intervention.  Patient and daughter verbalized understanding.          No follow-ups on file.

## 2022-10-14 ENCOUNTER — PATIENT MESSAGE (OUTPATIENT)
Dept: PODIATRY | Facility: CLINIC | Age: 77
End: 2022-10-14
Payer: MEDICARE

## 2022-10-15 LAB
6MAM UR QL: NOT DETECTED
7AMINOCLONAZEPAM UR QL: NOT DETECTED
A-OH ALPRAZ UR QL: NOT DETECTED
ALPHA-OH-MIDAZOLAM: NOT DETECTED
ALPRAZ UR QL: NOT DETECTED
AMPHET UR QL SCN: NOT DETECTED
ANNOTATION COMMENT IMP: NORMAL
ANNOTATION COMMENT IMP: NORMAL
BARBITURATES UR QL: NOT DETECTED
BUPRENORPHINE UR QL: NOT DETECTED
BZE UR QL: NOT DETECTED
CARBOXYTHC UR QL: NOT DETECTED
CARISOPRODOL UR QL: NOT DETECTED
CLONAZEPAM UR QL: NOT DETECTED
CODEINE UR QL: NOT DETECTED
CREAT UR-MCNC: 78.9 MG/DL (ref 20–400)
DIAZEPAM UR QL: NOT DETECTED
ETHYL GLUCURONIDE UR QL: NOT DETECTED
FENTANYL UR QL: NOT DETECTED
GABAPENTIN: NOT DETECTED
HYDROCODONE UR QL: PRESENT
HYDROMORPHONE UR QL: NOT DETECTED
LORAZEPAM UR QL: NOT DETECTED
MDA UR QL: NOT DETECTED
MDEA UR QL: NOT DETECTED
MDMA UR QL: NOT DETECTED
ME-PHENIDATE UR QL: NOT DETECTED
METHADONE UR QL: NOT DETECTED
METHAMPHET UR QL: NOT DETECTED
MIDAZOLAM UR QL SCN: NOT DETECTED
MORPHINE UR QL: NOT DETECTED
NALOXONE: NOT DETECTED
NORBUPRENORPHINE UR QL CFM: NOT DETECTED
NORDIAZEPAM UR QL: NOT DETECTED
NORFENTANYL UR QL: NOT DETECTED
NORHYDROCODONE UR QL CFM: NOT DETECTED
NORMEPERIDINE UR QL CFM: NOT DETECTED
NOROXYCODONE UR QL CFM: NOT DETECTED
NOROXYMORPHONE UR QL SCN: NOT DETECTED
OXAZEPAM UR QL: NOT DETECTED
OXYCODONE UR QL: NOT DETECTED
OXYMORPHONE UR QL: NOT DETECTED
PATHOLOGY STUDY: NORMAL
PCP UR QL: NOT DETECTED
PHENTERMINE UR QL: NOT DETECTED
PREGABALIN: NOT DETECTED
SERVICE CMNT-IMP: NORMAL
TAPENTADOL UR QL SCN: NOT DETECTED
TAPENTADOL UR QL SCN: NOT DETECTED
TEMAZEPAM UR QL: NOT DETECTED
TRAMADOL UR QL: NOT DETECTED
ZOLPIDEM METABOLITE: NOT DETECTED
ZOLPIDEM UR QL: NOT DETECTED

## 2022-10-16 ENCOUNTER — HOSPITAL ENCOUNTER (EMERGENCY)
Facility: OTHER | Age: 77
Discharge: HOME | End: 2022-10-16
Attending: EMERGENCY MEDICINE
Payer: MEDICARE

## 2022-10-16 VITALS
HEIGHT: 69 IN | BODY MASS INDEX: 39.4 KG/M2 | SYSTOLIC BLOOD PRESSURE: 124 MMHG | OXYGEN SATURATION: 98 % | DIASTOLIC BLOOD PRESSURE: 58 MMHG | WEIGHT: 266 LBS | HEART RATE: 56 BPM | RESPIRATION RATE: 18 BRPM | TEMPERATURE: 99 F

## 2022-10-16 DIAGNOSIS — L03.116 CELLULITIS OF LEFT LOWER EXTREMITY: Primary | ICD-10-CM

## 2022-10-16 PROCEDURE — 99281 EMR DPT VST MAYX REQ PHY/QHP: CPT

## 2022-10-16 NOTE — ED PROVIDER NOTES
Chief complaint:  foot sore (Pt c.o wound to left foot onset few weeks.  Pt seen by Dr Quinones placed on antibiotics.  Advised if no improvement to come to er for admit for iv antibiotics. Pt states no improvement.  Denies fever.  Wound to left foot not visualized in triage. Bandage present with post op shoe )      HPI:  Richard Bustos is a 77 y.o. male presenting with history of diabetes, peripheral vascular disease, and recent evaluation by Podiatry for small ulceration on left 2nd digit.  Patient was seen 3 days previously by Podiatry and started on antibiotics pain daughter notes increasing pain, with discomfort on attempts to ambulate, and encouraged to come to the emergency department for evaluation.  Per [facility] review patient has MRI scheduled, labs obtained and notable for lack of leukocytosis, sed rate at baseline, normal uric acid.      ROS: As per HPI and below:    Constitutional: - fever, -chills.  Eyes: No discharge. No pain.  HENT: no nasal congestion, -anosmia; No sore throat.   Cardiovascular: - chest pain, no palpitations.  Respiratory: -cough, -shortness of breath.  Gastrointestinal: -nausea, no diarrhea, - abdominal pain, -vomiting.   Genitourinary: No hematuria, dysuria, urgency.  Musculoskeletal: No back pain, left foot pain with weight bearing.  Skin:  Redness to left 2nd toe, with wound present to base of the digit, mild amount of drainage present., no lesions.  Neurological: -headache, no focal weakness.    Review of patient's allergies indicates:   Allergen Reactions    Donepezil Other (See Comments)     AMS    Bactroban [mupirocin calcium] Blisters     Causes Blisters    Shellfish containing products Other (See Comments)     Other reaction(s): Gout  OYSTERS       Current Facility-Administered Medications on File Prior to Encounter   Medication Dose Route Frequency Provider Last Rate Last Admin    lactated ringers infusion   Intravenous Once PRN Evangelist Bose MD         Current Outpatient  "Medications on File Prior to Encounter   Medication Sig Dispense Refill    albuterol (VENTOLIN HFA) 90 mcg/actuation inhaler Inhale 2 puffs into the lungs every 6 (six) hours as needed for Wheezing or Shortness of Breath. Rescue 1 g 2    allopurinoL (ZYLOPRIM) 100 MG tablet Take 1 tablet (100 mg total) by mouth every evening. 90 tablet 3    ammonium lactate 12 % Crea Apply twice daily to affected parts both feet as needed. 140 g 11    aspirin (ECOTRIN) 81 MG EC tablet Take 81 mg by mouth once daily.      atorvastatin (LIPITOR) 80 MG tablet Take 1 tablet (80 mg total) by mouth once daily. 90 tablet 3    calcitRIOL (ROCALTROL) 0.5 MCG Cap 0.75 mcg once daily.   4    carvediloL (COREG) 25 MG tablet Take 1 tablet (25 mg total) by mouth 2 (two) times daily with meals. 180 tablet 3    ciclopirox 0.77 % Gel Apply topically 2 (two) times daily. 100 g 3    clindamycin (CLEOCIN) 300 MG capsule Take 1 capsule (300 mg total) by mouth 3 (three) times daily. for 10 days 30 capsule 0    clopidogreL (PLAVIX) 75 mg tablet Take 1 tablet (75 mg total) by mouth once daily. 90 tablet 3    famotidine (PEPCID) 40 MG tablet Take 1 tablet (40 mg total) by mouth once daily. 90 tablet 3    ferrous sulfate (FEROSUL) 325 mg (65 mg iron) Tab tablet TAKE 1 TABLET BY MOUTH TWICE DAILY 180 tablet 3    fluticasone propionate (FLONASE) 50 mcg/actuation nasal spray SHAKE LIQUID AND USE 2 SPRAYS(100 MCG) IN EACH NOSTRIL EVERY DAY 16 g 5    foam bandage 4 X 4 " Bndg Change every 3 days until healed 5 each 0    FOLIC ACID/MULTIVIT-MIN/LUTEIN (CENTRUM SILVER ORAL) Take 1 tablet by mouth once daily.      HYDROcodone-acetaminophen (NORCO) 5-325 mg per tablet TAKE ONE TABLET BY MOUTH EVERY 8 HOURS AS NEEDED FOR PAIN 90 tablet 0    hydrocortisone 2.5 % cream Apply topically 2 (two) times daily. 1 Tube 0    ipratropium (ATROVENT) 42 mcg (0.06 %) nasal spray 42 sprays by Nasal route.      LIDOcaine HCL 2% (XYLOCAINE) 2 % jelly Apply topically as needed. Apply " topically once nightly to affected part of foot/feet. 30 mL 2    lisinopriL (PRINIVIL,ZESTRIL) 40 MG tablet Take 1 tablet (40 mg total) by mouth every evening. 90 tablet 3    magnesium oxide (MAG-OX) 400 mg (241.3 mg magnesium) tablet Take 1 tablet (400 mg total) by mouth once daily. 90 tablet 3    mecobal-levomefolat Ca-B6 phos (L-METHYL-B6-B12) 3-35-2 mg Tab Take 1 tablet by mouth 2 (two) times daily. 180 tablet 3    mirabegron (MYRBETRIQ) 50 mg Tb24 Take 1 tablet (50 mg total) by mouth once daily. 90 tablet 3    nitroGLYCERIN 0.4 MG/DOSE TL SPRY (NITROLINGUAL) 400 mcg/spray spray PLACE 1 SPRAY UNDER THE TONGUE EVERY 5 MINUTES AS NEEDED FOR CHEST PAIN 4.9 g 9    nystatin (MYCOSTATIN) powder Apply topically 2 (two) times daily. 60 g 11    omeprazole (PRILOSEC) 20 MG capsule Take 1 capsule (20 mg total) by mouth once daily. 90 capsule 3    prothrombin time/INR test metr Misc 1 Stick by Misc.(Non-Drug; Combo Route) route every 30 days. 1 each 0    traZODone (DESYREL) 50 MG tablet Take 1 tablet (50 mg total) by mouth every evening. 90 tablet 3    triamcinolone acetonide 0.1% (KENALOG) 0.1 % cream APPLY TO THE AFFECTED AREA(S) TWICE DAILY 80 g 3    vit A/vit C/vit E/zinc/copper (PRESERVISION AREDS ORAL) Take by mouth 2 (two) times a day.       warfarin (COUMADIN) 5 MG tablet 5 mg except on wednesday and saturday Wed and sat 2.5 mgs 90 tablet 3       PMH:  As per HPI and below:  Past Medical History:   Diagnosis Date    Anemia     Anemia of other chronic disease 09/13/2017    Anemia, chronic renal failure 09/13/2017    Anemia, unspecified 09/13/2017    Anticoagulant long-term use     Aorta aneurysm     Arthritis     Atrial fibrillation     Bacteremia due to Streptococcus 01/08/2022    CAD (coronary artery disease)     CHF (congestive heart failure)     Chronic kidney disease     Clotting disorder 09/13/2017    Colon polyp     Dementia     Diabetes mellitus     not on meds    Diabetes mellitus type II     Diverticulosis      Elevated PSA     Encounter for blood transfusion     Former smoker     General anesthetics causing adverse effect in therapeutic use     TROUBLE COMING OUT OF ANESTHESIA WITH GASTRIC BYPASS    GERD (gastroesophageal reflux disease)     Gout     Hx of colonic polyps     Hypercoagulable state     Hyperlipidemia     Hypertension     MI, old 2010    MTHFR mutation     Myocardial infarction     9/2010    Osteomyelitis of ankle or foot 03/09/2017    Prostate cancer 06/2016    Sleep apnea     Squamous cell carcinoma 01/23/2018    Left helix, imiquimod    Venous stasis     Chronic     Past Surgical History:   Procedure Laterality Date    ABDOMINAL SURGERY      CARDIAC SURGERY      3 STENTS     CAUDAL EPIDURAL STEROID INJECTION N/A 6/22/2020    Procedure: INJECTION, STEROID, SPINE, EPIDURAL, CAUDAL;  Surgeon: Evangelist Bose MD;  Location: [facility] OR;  Service: Pain Management;  Laterality: N/A;    COLONOSCOPY  02/2011    diverticulosis with diverticula with clot, no active bleeding    COLONOSCOPY N/A 8/24/2016    Procedure: COLONOSCOPY;  Surgeon: Saroj Borjas MD;  Location: [facility] ENDO;  Service: Endoscopy;  Laterality: N/A;    COLONOSCOPY N/A 11/18/2020    Procedure: COLONOSCOPY;  Surgeon: Saroj Borjas MD;  Location: [facility] ENDO;  Service: Endoscopy;  Laterality: N/A;    COLONOSCOPY N/A 10/27/2021    Procedure: COLONOSCOPY;  Surgeon: Saroj Borjas MD;  Location: [facility] ENDO;  Service: Endoscopy;  Laterality: N/A;    EPIDURAL STEROID INJECTION INTO LUMBAR SPINE N/A 6/22/2020    Procedure: Injection-steroid-epidural-lumbar;  Surgeon: Evangelist Bose MD;  Location: [facility] OR;  Service: Pain Management;  Laterality: N/A;  L3 L4 L5 S1    EPIDURAL STEROID INJECTION INTO LUMBAR SPINE N/A 9/21/2020    Procedure: Injection-steroid-epidural-lumbar;  Surgeon: Evangelist Bose MD;  Location: [facility] OR;  Service: Pain Management;  Laterality: N/A;  L5-S1    GASTRIC BYPASS  2/5/2008    IVC FILTER RETRIEVAL      JOINT REPLACEMENT   1996 and 2001    bi-lat hip replacement/Rt Hip and Lt Hip    RADIOFREQUENCY ABLATION OF LUMBAR MEDIAL BRANCH NERVE AT SINGLE LEVEL Bilateral 1/10/2020    Procedure: Radiofrequency Ablation, Nerve, Spinal, Lumbar, Medial Branch, 1 Level;  Surgeon: Evangelist Bose MD;  Location: [facility] OR;  Service: Pain Management;  Laterality: Bilateral;  L3,L4,L5    RADIOFREQUENCY ABLATION OF LUMBAR MEDIAL BRANCH NERVE AT SINGLE LEVEL Bilateral 11/23/2021    Procedure: Radiofrequency Ablation, Nerve, Spinal, Lumbar, Medial Branch, Bilateral L 3,4,5;  Surgeon: Nile Causey MD;  Location: [facility] OR;  Service: Pain Management;  Laterality: Bilateral;    ROTATOR CUFF REPAIR      Rt shoulder    Stents  8/18/2010    x 3    UPPER GASTROINTESTINAL ENDOSCOPY  02/2011       Social History     Socioeconomic History    Marital status:    Tobacco Use    Smoking status: Former    Smokeless tobacco: Never    Tobacco comments:     45 yrs ago, only smoked 3-4 packs in his entire life as a teenager   Substance and Sexual Activity    Alcohol use: Not Currently     Comment: rare    Drug use: No    Sexual activity: Not Currently       Family History   Problem Relation Age of Onset    Heart attack Mother     Heart attack Father     Heart attack Brother     Ulcerative colitis Daughter 35    Lupus Daughter     Colon cancer Neg Hx     Colon polyps Neg Hx     Crohn's disease Neg Hx     Melanoma Neg Hx     Psoriasis Neg Hx     Eczema Neg Hx        Physical Exam:    Vitals:    10/16/22 1121   BP: (!) 124/58   Pulse: (!) 56   Resp: 18   Temp: 98.7 °F (37.1 °C)         General Appearance: The patient is alert, has no immediate or signs of toxicity. No acute distress.    HEENT:  Extra ocular movements intact. No drainage.   Neck: No stridor.   Respiratory: No increased work of breathing, speaking in full sentences  CV: nml perfusion, no elevated JVD  Gastrointestinal:   No guarding  Neurological: Alert and appropriate, moving all extremities  spontaneously  Skin: Warm and dry, hyperpigmentation to bilateral shins with loss of hair, left foot in bulky dressing, bandages removed, with small 0.5 cm superficial ulceration present to base of 2nd phalynx, mild erythema to the digit, no fluctuance, no bony tenderness  Musculoskeletal: Extremities without notable edema, appropriate ROM    Psych: Normal affect, no evidence of psychosis    Labs Reviewed - No data to display      MDM:    77 y.o. male with peripheral artery disease, diabetes here with concern for possible infection of the left foot.  Patient has been seen by Podiatry with workup for possible osteomyelitis.  Patient has an MRI scheduled, lab work without leukocytosis performed 3 days ago.  Here on examination patient has no tunneling, no minerva purulence, imaging without osseous destruction, no fever.  I suspect continued local cellulitis without concern for osteomyelitis at this time.  Given antibiotics have only just been initiated I do not think this is true treatment failure, and patient and family reassured.  I do not think the patient requires additional blood work, nor admission at this time though encouraged strict follow-up with imaging Podiatry as scheduled.    Medications - No data to display         Diagnoses:    1. Cellulitis of left lower extremity                 Amirah Burciaga MD  10/16/22 8764     Home

## 2022-10-16 NOTE — ED TRIAGE NOTES
Chief Complaint   Patient presents with    foot sore     Pt c.o wound to left foot onset few weeks.  Pt seen by Dr Quinones placed on antibiotics.  Advised if no improvement to come to er for admit for iv antibiotics. Pt states no improvement.  Denies fever.  Wound to left foot not visualized in triage. Bandage present with post op shoe      Pt presents to the ED with c/o painful wound on left foot. Visual small ulcer under second toe on R foot, small amount of drainage. Pt is taking antibiotics, states no improvement. Pt is wearing bandage and post op shoe. No visual signs of infection. AAOX4 and in no acute distress.

## 2022-10-18 ENCOUNTER — OFFICE VISIT (OUTPATIENT)
Dept: PODIATRY | Facility: CLINIC | Age: 77
End: 2022-10-18
Payer: MEDICARE

## 2022-10-18 VITALS
BODY MASS INDEX: 37.47 KG/M2 | HEART RATE: 67 BPM | HEIGHT: 69 IN | SYSTOLIC BLOOD PRESSURE: 150 MMHG | WEIGHT: 253 LBS | DIASTOLIC BLOOD PRESSURE: 77 MMHG

## 2022-10-18 DIAGNOSIS — L97.522 ULCER OF TOE, LEFT, WITH FAT LAYER EXPOSED: Primary | ICD-10-CM

## 2022-10-18 DIAGNOSIS — E11.49 TYPE II DIABETES MELLITUS WITH NEUROLOGICAL MANIFESTATIONS: ICD-10-CM

## 2022-10-18 PROCEDURE — 1159F PR MEDICATION LIST DOCUMENTED IN MEDICAL RECORD: ICD-10-PCS | Mod: CPTII,S$GLB,, | Performed by: PODIATRIST

## 2022-10-18 PROCEDURE — 3044F PR MOST RECENT HEMOGLOBIN A1C LEVEL <7.0%: ICD-10-PCS | Mod: CPTII,S$GLB,, | Performed by: PODIATRIST

## 2022-10-18 PROCEDURE — 99999 PR PBB SHADOW E&M-EST. PATIENT-LVL IV: ICD-10-PCS | Mod: PBBFAC,,, | Performed by: PODIATRIST

## 2022-10-18 PROCEDURE — 3077F PR MOST RECENT SYSTOLIC BLOOD PRESSURE >= 140 MM HG: ICD-10-PCS | Mod: CPTII,S$GLB,, | Performed by: PODIATRIST

## 2022-10-18 PROCEDURE — 1125F PR PAIN SEVERITY QUANTIFIED, PAIN PRESENT: ICD-10-PCS | Mod: CPTII,S$GLB,, | Performed by: PODIATRIST

## 2022-10-18 PROCEDURE — 3288F FALL RISK ASSESSMENT DOCD: CPT | Mod: CPTII,S$GLB,, | Performed by: PODIATRIST

## 2022-10-18 PROCEDURE — 1160F RVW MEDS BY RX/DR IN RCRD: CPT | Mod: CPTII,S$GLB,, | Performed by: PODIATRIST

## 2022-10-18 PROCEDURE — 1159F MED LIST DOCD IN RCRD: CPT | Mod: CPTII,S$GLB,, | Performed by: PODIATRIST

## 2022-10-18 PROCEDURE — 99213 PR OFFICE/OUTPT VISIT, EST, LEVL III, 20-29 MIN: ICD-10-PCS | Mod: 25,S$GLB,, | Performed by: PODIATRIST

## 2022-10-18 PROCEDURE — 11042 WOUND DEBRIDEMENT: ICD-10-PCS | Mod: S$GLB,,, | Performed by: PODIATRIST

## 2022-10-18 PROCEDURE — 99999 PR PBB SHADOW E&M-EST. PATIENT-LVL IV: CPT | Mod: PBBFAC,,, | Performed by: PODIATRIST

## 2022-10-18 PROCEDURE — 1160F PR REVIEW ALL MEDS BY PRESCRIBER/CLIN PHARMACIST DOCUMENTED: ICD-10-PCS | Mod: CPTII,S$GLB,, | Performed by: PODIATRIST

## 2022-10-18 PROCEDURE — 99213 OFFICE O/P EST LOW 20 MIN: CPT | Mod: 25,S$GLB,, | Performed by: PODIATRIST

## 2022-10-18 PROCEDURE — 3077F SYST BP >= 140 MM HG: CPT | Mod: CPTII,S$GLB,, | Performed by: PODIATRIST

## 2022-10-18 PROCEDURE — 3078F DIAST BP <80 MM HG: CPT | Mod: CPTII,S$GLB,, | Performed by: PODIATRIST

## 2022-10-18 PROCEDURE — 3288F PR FALLS RISK ASSESSMENT DOCUMENTED: ICD-10-PCS | Mod: CPTII,S$GLB,, | Performed by: PODIATRIST

## 2022-10-18 PROCEDURE — 3044F HG A1C LEVEL LT 7.0%: CPT | Mod: CPTII,S$GLB,, | Performed by: PODIATRIST

## 2022-10-18 PROCEDURE — 87077 CULTURE AEROBIC IDENTIFY: CPT | Performed by: PODIATRIST

## 2022-10-18 PROCEDURE — 1125F AMNT PAIN NOTED PAIN PRSNT: CPT | Mod: CPTII,S$GLB,, | Performed by: PODIATRIST

## 2022-10-18 PROCEDURE — 4010F ACE/ARB THERAPY RXD/TAKEN: CPT | Mod: CPTII,S$GLB,, | Performed by: PODIATRIST

## 2022-10-18 PROCEDURE — 87186 SC STD MICRODIL/AGAR DIL: CPT | Performed by: PODIATRIST

## 2022-10-18 PROCEDURE — 3078F PR MOST RECENT DIASTOLIC BLOOD PRESSURE < 80 MM HG: ICD-10-PCS | Mod: CPTII,S$GLB,, | Performed by: PODIATRIST

## 2022-10-18 PROCEDURE — 1101F PR PT FALLS ASSESS DOC 0-1 FALLS W/OUT INJ PAST YR: ICD-10-PCS | Mod: CPTII,S$GLB,, | Performed by: PODIATRIST

## 2022-10-18 PROCEDURE — 87075 CULTR BACTERIA EXCEPT BLOOD: CPT | Performed by: PODIATRIST

## 2022-10-18 PROCEDURE — 87070 CULTURE OTHR SPECIMN AEROBIC: CPT | Performed by: PODIATRIST

## 2022-10-18 PROCEDURE — 11042 DBRDMT SUBQ TIS 1ST 20SQCM/<: CPT | Mod: S$GLB,,, | Performed by: PODIATRIST

## 2022-10-18 PROCEDURE — 87076 CULTURE ANAEROBE IDENT EACH: CPT | Mod: 59 | Performed by: PODIATRIST

## 2022-10-18 PROCEDURE — 4010F PR ACE/ARB THEARPY RXD/TAKEN: ICD-10-PCS | Mod: CPTII,S$GLB,, | Performed by: PODIATRIST

## 2022-10-18 PROCEDURE — 1101F PT FALLS ASSESS-DOCD LE1/YR: CPT | Mod: CPTII,S$GLB,, | Performed by: PODIATRIST

## 2022-10-18 NOTE — PROGRESS NOTES
Subjective:      Patient ID: Richard Bustos is a 77 y.o. male.    Chief Complaint: Foot Ulcer (Left foot 2nd digit wound underneath toe)    Rapid onset redness pain swelling heat left forefoot.  Patient was seen in the emergency room yesterday for increased pain swelling redness 2nd toe left.  During dressing change the ulcer cord popped and several cc of hansen pus were expressed.  The wound was dressed.  No cultures were taken.  Patient is taking clindamycin 3 times daily.  Has MRI scheduled for this evening.  Denies trauma or other adverse events since last visit.  X-rays negative for gas, foreign body, osteolysis.  Blood work shows elevated creatinine at 1.7    Review of Systems   Constitutional: Negative for chills, fever, malaise/fatigue and night sweats.   Cardiovascular:  Negative for claudication, cyanosis and leg swelling.   Skin:  Positive for poor wound healing. Negative for color change, itching, rash and suspicious lesions.   Musculoskeletal:  Negative for falls, gout, joint pain and myalgias.   Neurological:  Positive for numbness and sensory change.         Objective:      Physical Exam  Constitutional:       General: He is not in acute distress.     Appearance: He is well-developed. He is not diaphoretic.   Cardiovascular:      Pulses:           Dorsalis pedis pulses are 1+ on the right side and 1+ on the left side.        Posterior tibial pulses are 1+ on the right side and 1+ on the left side.      Comments: Toes warm, pink, cap fill <5 seconds.  Musculoskeletal:      Comments: Left 2nd toe was dorsal MTP contracture which is reducible hallux valgus deformity also reducible unchanged from baseline.   Lymphadenopathy:      Comments: Negative lymphadenopathy bilateral popliteal fossa and tarsal tunnel.     Skin:     General: Skin is warm and dry.      Coloration: Skin is not pale.      Findings: No abrasion, bruising, burn, ecchymosis, erythema, laceration, lesion or rash.      Comments: Wound at  the plantar 2nd toe left foot is improved in girth without fluctuance now probes to joint capsule without exposed bone or joint.    2 x 2 x 2 mm pre debridement    5 x 5 x 5 mm post debridement    Otherwise, Skin is thin, and atrophied to lower extremity bilateral    Bright erythema of the 2nd toe which is diffusely present through the bases of MTPs 1 2 and 3 left without sharply demarcated borders.     Neurological:      Sensory: Sensory deficit present.      Comments: Diminished/loss of protective sensation all toes bilateral to 10 gram monofilament.     Psychiatric:         Behavior: Behavior is cooperative.           Assessment:       Encounter Diagnoses   Name Primary?    Ulcer of toe, left, with fat layer exposed Yes    Type II diabetes mellitus with neurological manifestations          Plan:       Richard was seen today for foot ulcer.    Diagnoses and all orders for this visit:    Ulcer of toe, left, with fat layer exposed  -     Wound Debridement  -     Aerobic culture  -     Culture, Anaerobic    Type II diabetes mellitus with neurological manifestations  -     Wound Debridement  -     Aerobic culture  -     Culture, Anaerobic    I counseled the patient on his conditions, their implications and medical management.    New cultures today.    MRI without contrast due to creatinine and shellfish allergy, .      Redressed the wound today.  Change with wound foam Kerlix wound net every other day.      Continue minimal ambulation surgical shoe left, diabetic shoe custom insert right.      Finish Clindamycin.  Coverage in the absence of culturable tissue.    One week, sooner p.r.n..      Patient has been through this several times, we reiterated the need for vigilant monitoring and in the event this is not improved in a few days, and is present in the emergency room for admission consultation with Podiatry Infectious Disease more urgent imaging possible surgical intervention.  Patient and daughter verbalized  understanding.          No follow-ups on file.

## 2022-10-18 NOTE — PROCEDURES
"Wound Debridement    Date/Time: 10/18/2022 9:07 AM  Performed by: Iván Torrez DPM  Authorized by: Iván Torrez DPM     Time out: Immediately prior to procedure a "time out" was called to verify the correct patient, procedure, equipment, support staff and site/side marked as required.    Consent Done?:  Yes (Verbal)  Local anesthesia used?: No      Wound Details:    Location:  Left foot    Location:  Left 2nd Toe    Type of Debridement:  Excisional       Length (cm):  0.5       Area (sq cm):  0.25       Width (cm):  0.5       Percent Debrided (%):  80       Depth (cm):  0.5       Total Area Debrided (sq cm):  0.2    Depth of debridement:  Subcutaneous tissue    Tissue debrided:  Dermis, Epidermis and Subcutaneous    Devitalized tissue debrided:  Biofilm and Callus    Instruments:  Nippers    Bleeding:  Minimal  Hemostasis Achieved: Yes    Method Used:  Pressure  Patient tolerance:  Patient tolerated the procedure well with no immediate complications  "

## 2022-10-19 ENCOUNTER — TELEPHONE (OUTPATIENT)
Dept: PODIATRY | Facility: CLINIC | Age: 77
End: 2022-10-19
Payer: MEDICARE

## 2022-10-19 RX ORDER — DOXYCYCLINE 100 MG/1
100 TABLET ORAL 2 TIMES DAILY
Qty: 20 TABLET | Refills: 0 | Status: SHIPPED | OUTPATIENT
Start: 2022-10-19 | End: 2022-10-24 | Stop reason: ALTCHOICE

## 2022-10-19 NOTE — TELEPHONE ENCOUNTER
Staff informed the patient daughter, based on patient most recent available cultures, and his MRI results, I would like him to take the doxycycline I have prescribed for him and sent to the pharmacy today.  He can stop the clindamycin. Per Dr. Torrez.    Patient daughter verbalized understanding.        ----- Message from Iván Torrez DPM sent at 10/19/2022  2:04 PM CDT -----  Please make the patient aware that based on his most recent available cultures, and his MRI results, I would like him to take the doxycycline I have prescribed for him and sent to the pharmacy today.  He can stop the clindamycin.    I am also putting in a consult with Infectious diseases just to make sure they feel this is the most appropriate management.  Please help him schedule that appointment.    Thank you

## 2022-10-19 NOTE — ADDENDUM NOTE
Addended by: ALBINO LARSON on: 10/19/2022 02:06 PM     Modules accepted: Orders     Statement Selected

## 2022-10-21 LAB — BACTERIA SPEC AEROBE CULT: ABNORMAL

## 2022-10-23 ENCOUNTER — PATIENT MESSAGE (OUTPATIENT)
Dept: PODIATRY | Facility: CLINIC | Age: 77
End: 2022-10-23
Payer: MEDICARE

## 2022-10-23 RX ORDER — AMOXICILLIN AND CLAVULANATE POTASSIUM 500; 125 MG/1; MG/1
1 TABLET, FILM COATED ORAL 2 TIMES DAILY
Qty: 20 TABLET | Refills: 0 | Status: SHIPPED | OUTPATIENT
Start: 2022-10-23 | End: 2022-11-01 | Stop reason: SDUPTHER

## 2022-10-24 ENCOUNTER — OFFICE VISIT (OUTPATIENT)
Dept: FAMILY MEDICINE | Facility: CLINIC | Age: 77
End: 2022-10-24
Payer: MEDICARE

## 2022-10-24 VITALS
TEMPERATURE: 98 F | SYSTOLIC BLOOD PRESSURE: 134 MMHG | BODY MASS INDEX: 37.99 KG/M2 | DIASTOLIC BLOOD PRESSURE: 70 MMHG | HEIGHT: 69 IN | OXYGEN SATURATION: 97 % | HEART RATE: 69 BPM | WEIGHT: 256.5 LBS

## 2022-10-24 DIAGNOSIS — I73.9 PERIPHERAL VASCULAR DISEASE: ICD-10-CM

## 2022-10-24 DIAGNOSIS — E11.42 DIABETIC POLYNEUROPATHY ASSOCIATED WITH TYPE 2 DIABETES MELLITUS: ICD-10-CM

## 2022-10-24 DIAGNOSIS — I42.9 CARDIOMYOPATHY, UNSPECIFIED TYPE: ICD-10-CM

## 2022-10-24 DIAGNOSIS — K21.9 GASTROESOPHAGEAL REFLUX DISEASE WITHOUT ESOPHAGITIS: ICD-10-CM

## 2022-10-24 DIAGNOSIS — N18.32 STAGE 3B CHRONIC KIDNEY DISEASE: ICD-10-CM

## 2022-10-24 DIAGNOSIS — E11.59 HYPERTENSION ASSOCIATED WITH DIABETES: ICD-10-CM

## 2022-10-24 DIAGNOSIS — E11.22 TYPE 2 DIABETES MELLITUS WITH STAGE 3B CHRONIC KIDNEY DISEASE, WITHOUT LONG-TERM CURRENT USE OF INSULIN: Primary | Chronic | ICD-10-CM

## 2022-10-24 DIAGNOSIS — I48.0 PAROXYSMAL ATRIAL FIBRILLATION: ICD-10-CM

## 2022-10-24 DIAGNOSIS — E78.5 HYPERLIPIDEMIA ASSOCIATED WITH TYPE 2 DIABETES MELLITUS: ICD-10-CM

## 2022-10-24 DIAGNOSIS — C61 PROSTATE CANCER: ICD-10-CM

## 2022-10-24 DIAGNOSIS — Z15.89 MTHFR MUTATION (METHYLENETETRAHYDROFOLATE REDUCTASE): ICD-10-CM

## 2022-10-24 DIAGNOSIS — I15.2 HYPERTENSION ASSOCIATED WITH DIABETES: ICD-10-CM

## 2022-10-24 DIAGNOSIS — F33.9 DEPRESSION, RECURRENT: ICD-10-CM

## 2022-10-24 DIAGNOSIS — I50.22 CHRONIC SYSTOLIC CONGESTIVE HEART FAILURE: ICD-10-CM

## 2022-10-24 DIAGNOSIS — N18.32 TYPE 2 DIABETES MELLITUS WITH STAGE 3B CHRONIC KIDNEY DISEASE, WITHOUT LONG-TERM CURRENT USE OF INSULIN: Primary | Chronic | ICD-10-CM

## 2022-10-24 DIAGNOSIS — D68.59 HYPERCOAGULABLE STATE: ICD-10-CM

## 2022-10-24 DIAGNOSIS — E11.69 HYPERLIPIDEMIA ASSOCIATED WITH TYPE 2 DIABETES MELLITUS: ICD-10-CM

## 2022-10-24 LAB — BACTERIA SPEC ANAEROBE CULT: ABNORMAL

## 2022-10-24 PROCEDURE — 3044F HG A1C LEVEL LT 7.0%: CPT | Mod: CPTII,S$GLB,, | Performed by: FAMILY MEDICINE

## 2022-10-24 PROCEDURE — 99214 PR OFFICE/OUTPT VISIT, EST, LEVL IV, 30-39 MIN: ICD-10-PCS | Mod: S$GLB,,, | Performed by: FAMILY MEDICINE

## 2022-10-24 PROCEDURE — 3288F PR FALLS RISK ASSESSMENT DOCUMENTED: ICD-10-PCS | Mod: CPTII,S$GLB,, | Performed by: FAMILY MEDICINE

## 2022-10-24 PROCEDURE — G0008 FLU VACCINE - QUADRIVALENT - ADJUVANTED: ICD-10-PCS | Mod: S$GLB,,, | Performed by: FAMILY MEDICINE

## 2022-10-24 PROCEDURE — 3075F PR MOST RECENT SYSTOLIC BLOOD PRESS GE 130-139MM HG: ICD-10-PCS | Mod: CPTII,S$GLB,, | Performed by: FAMILY MEDICINE

## 2022-10-24 PROCEDURE — 90694 VACC AIIV4 NO PRSRV 0.5ML IM: CPT | Mod: S$GLB,,, | Performed by: FAMILY MEDICINE

## 2022-10-24 PROCEDURE — 1125F PR PAIN SEVERITY QUANTIFIED, PAIN PRESENT: ICD-10-PCS | Mod: CPTII,S$GLB,, | Performed by: FAMILY MEDICINE

## 2022-10-24 PROCEDURE — 3288F FALL RISK ASSESSMENT DOCD: CPT | Mod: CPTII,S$GLB,, | Performed by: FAMILY MEDICINE

## 2022-10-24 PROCEDURE — 3044F PR MOST RECENT HEMOGLOBIN A1C LEVEL <7.0%: ICD-10-PCS | Mod: CPTII,S$GLB,, | Performed by: FAMILY MEDICINE

## 2022-10-24 PROCEDURE — 90694 FLU VACCINE - QUADRIVALENT - ADJUVANTED: ICD-10-PCS | Mod: S$GLB,,, | Performed by: FAMILY MEDICINE

## 2022-10-24 PROCEDURE — 99214 OFFICE O/P EST MOD 30 MIN: CPT | Mod: S$GLB,,, | Performed by: FAMILY MEDICINE

## 2022-10-24 PROCEDURE — 99499 RISK ADDL DX/OHS AUDIT: ICD-10-PCS | Mod: S$GLB,,, | Performed by: FAMILY MEDICINE

## 2022-10-24 PROCEDURE — 1125F AMNT PAIN NOTED PAIN PRSNT: CPT | Mod: CPTII,S$GLB,, | Performed by: FAMILY MEDICINE

## 2022-10-24 PROCEDURE — 1101F PR PT FALLS ASSESS DOC 0-1 FALLS W/OUT INJ PAST YR: ICD-10-PCS | Mod: CPTII,S$GLB,, | Performed by: FAMILY MEDICINE

## 2022-10-24 PROCEDURE — 3078F PR MOST RECENT DIASTOLIC BLOOD PRESSURE < 80 MM HG: ICD-10-PCS | Mod: CPTII,S$GLB,, | Performed by: FAMILY MEDICINE

## 2022-10-24 PROCEDURE — 1101F PT FALLS ASSESS-DOCD LE1/YR: CPT | Mod: CPTII,S$GLB,, | Performed by: FAMILY MEDICINE

## 2022-10-24 PROCEDURE — 4010F PR ACE/ARB THEARPY RXD/TAKEN: ICD-10-PCS | Mod: CPTII,S$GLB,, | Performed by: FAMILY MEDICINE

## 2022-10-24 PROCEDURE — 99499 UNLISTED E&M SERVICE: CPT | Mod: S$GLB,,, | Performed by: FAMILY MEDICINE

## 2022-10-24 PROCEDURE — 99999 PR PBB SHADOW E&M-EST. PATIENT-LVL III: ICD-10-PCS | Mod: PBBFAC,,, | Performed by: FAMILY MEDICINE

## 2022-10-24 PROCEDURE — 99999 PR PBB SHADOW E&M-EST. PATIENT-LVL III: CPT | Mod: PBBFAC,,, | Performed by: FAMILY MEDICINE

## 2022-10-24 PROCEDURE — 4010F ACE/ARB THERAPY RXD/TAKEN: CPT | Mod: CPTII,S$GLB,, | Performed by: FAMILY MEDICINE

## 2022-10-24 PROCEDURE — 3075F SYST BP GE 130 - 139MM HG: CPT | Mod: CPTII,S$GLB,, | Performed by: FAMILY MEDICINE

## 2022-10-24 PROCEDURE — G0008 ADMIN INFLUENZA VIRUS VAC: HCPCS | Mod: S$GLB,,, | Performed by: FAMILY MEDICINE

## 2022-10-24 PROCEDURE — 3078F DIAST BP <80 MM HG: CPT | Mod: CPTII,S$GLB,, | Performed by: FAMILY MEDICINE

## 2022-10-25 ENCOUNTER — OFFICE VISIT (OUTPATIENT)
Dept: PODIATRY | Facility: CLINIC | Age: 77
End: 2022-10-25
Payer: MEDICARE

## 2022-10-25 VITALS
DIASTOLIC BLOOD PRESSURE: 83 MMHG | SYSTOLIC BLOOD PRESSURE: 149 MMHG | HEART RATE: 53 BPM | HEIGHT: 69 IN | BODY MASS INDEX: 37.92 KG/M2 | WEIGHT: 256 LBS

## 2022-10-25 DIAGNOSIS — E11.49 TYPE II DIABETES MELLITUS WITH NEUROLOGICAL MANIFESTATIONS: ICD-10-CM

## 2022-10-25 DIAGNOSIS — I73.9 PVD (PERIPHERAL VASCULAR DISEASE): ICD-10-CM

## 2022-10-25 DIAGNOSIS — L97.522 ULCER OF TOE, LEFT, WITH FAT LAYER EXPOSED: Primary | ICD-10-CM

## 2022-10-25 DIAGNOSIS — Z79.01 LONG TERM (CURRENT) USE OF ANTICOAGULANTS: ICD-10-CM

## 2022-10-25 PROCEDURE — 99999 PR PBB SHADOW E&M-EST. PATIENT-LVL IV: CPT | Mod: PBBFAC,,, | Performed by: PODIATRIST

## 2022-10-25 PROCEDURE — 3079F PR MOST RECENT DIASTOLIC BLOOD PRESSURE 80-89 MM HG: ICD-10-PCS | Mod: CPTII,S$GLB,, | Performed by: PODIATRIST

## 2022-10-25 PROCEDURE — 1126F AMNT PAIN NOTED NONE PRSNT: CPT | Mod: CPTII,S$GLB,, | Performed by: PODIATRIST

## 2022-10-25 PROCEDURE — 11042 WOUND DEBRIDEMENT: ICD-10-PCS | Mod: S$GLB,,, | Performed by: PODIATRIST

## 2022-10-25 PROCEDURE — 99499 RISK ADDL DX/OHS AUDIT: ICD-10-PCS | Mod: S$GLB,,,

## 2022-10-25 PROCEDURE — 1126F PR PAIN SEVERITY QUANTIFIED, NO PAIN PRESENT: ICD-10-PCS | Mod: CPTII,S$GLB,, | Performed by: PODIATRIST

## 2022-10-25 PROCEDURE — 3079F DIAST BP 80-89 MM HG: CPT | Mod: CPTII,S$GLB,, | Performed by: PODIATRIST

## 2022-10-25 PROCEDURE — 1159F PR MEDICATION LIST DOCUMENTED IN MEDICAL RECORD: ICD-10-PCS | Mod: CPTII,S$GLB,, | Performed by: PODIATRIST

## 2022-10-25 PROCEDURE — 3077F SYST BP >= 140 MM HG: CPT | Mod: CPTII,S$GLB,, | Performed by: PODIATRIST

## 2022-10-25 PROCEDURE — 1159F MED LIST DOCD IN RCRD: CPT | Mod: CPTII,S$GLB,, | Performed by: PODIATRIST

## 2022-10-25 PROCEDURE — 99213 OFFICE O/P EST LOW 20 MIN: CPT | Mod: 25,S$GLB,, | Performed by: PODIATRIST

## 2022-10-25 PROCEDURE — 3288F FALL RISK ASSESSMENT DOCD: CPT | Mod: CPTII,S$GLB,, | Performed by: PODIATRIST

## 2022-10-25 PROCEDURE — 99999 PR PBB SHADOW E&M-EST. PATIENT-LVL IV: ICD-10-PCS | Mod: PBBFAC,,, | Performed by: PODIATRIST

## 2022-10-25 PROCEDURE — 11721 DEBRIDE NAIL 6 OR MORE: CPT | Mod: Q9,59,S$GLB, | Performed by: PODIATRIST

## 2022-10-25 PROCEDURE — 4010F ACE/ARB THERAPY RXD/TAKEN: CPT | Mod: CPTII,S$GLB,, | Performed by: PODIATRIST

## 2022-10-25 PROCEDURE — 11042 DBRDMT SUBQ TIS 1ST 20SQCM/<: CPT | Mod: S$GLB,,, | Performed by: PODIATRIST

## 2022-10-25 PROCEDURE — 3077F PR MOST RECENT SYSTOLIC BLOOD PRESSURE >= 140 MM HG: ICD-10-PCS | Mod: CPTII,S$GLB,, | Performed by: PODIATRIST

## 2022-10-25 PROCEDURE — 1101F PR PT FALLS ASSESS DOC 0-1 FALLS W/OUT INJ PAST YR: ICD-10-PCS | Mod: CPTII,S$GLB,, | Performed by: PODIATRIST

## 2022-10-25 PROCEDURE — 11721 PR DEBRIDEMENT OF NAILS, 6 OR MORE: ICD-10-PCS | Mod: Q9,59,S$GLB, | Performed by: PODIATRIST

## 2022-10-25 PROCEDURE — 3044F PR MOST RECENT HEMOGLOBIN A1C LEVEL <7.0%: ICD-10-PCS | Mod: CPTII,S$GLB,, | Performed by: PODIATRIST

## 2022-10-25 PROCEDURE — 99499 UNLISTED E&M SERVICE: CPT | Mod: S$GLB,,,

## 2022-10-25 PROCEDURE — 99213 PR OFFICE/OUTPT VISIT, EST, LEVL III, 20-29 MIN: ICD-10-PCS | Mod: 25,S$GLB,, | Performed by: PODIATRIST

## 2022-10-25 PROCEDURE — 3044F HG A1C LEVEL LT 7.0%: CPT | Mod: CPTII,S$GLB,, | Performed by: PODIATRIST

## 2022-10-25 PROCEDURE — 1101F PT FALLS ASSESS-DOCD LE1/YR: CPT | Mod: CPTII,S$GLB,, | Performed by: PODIATRIST

## 2022-10-25 PROCEDURE — 4010F PR ACE/ARB THEARPY RXD/TAKEN: ICD-10-PCS | Mod: CPTII,S$GLB,, | Performed by: PODIATRIST

## 2022-10-25 PROCEDURE — 3288F PR FALLS RISK ASSESSMENT DOCUMENTED: ICD-10-PCS | Mod: CPTII,S$GLB,, | Performed by: PODIATRIST

## 2022-10-25 NOTE — PROCEDURES
"Wound Debridement    Date/Time: 10/25/2022 9:23 AM  Performed by: Iván Torrez DPM  Authorized by: Iván Torrez DPM     Time out: Immediately prior to procedure a "time out" was called to verify the correct patient, procedure, equipment, support staff and site/side marked as required.    Consent Done?:  Yes (Verbal)  Local anesthesia used?: No      Wound Details:    Location:  Left foot    Location:  Left 2nd Toe    Type of Debridement:  Excisional       Length (cm):  0.5       Area (sq cm):  0.25       Width (cm):  0.5       Percent Debrided (%):  100       Depth (cm):  0.5       Total Area Debrided (sq cm):  0.25    Depth of debridement:  Subcutaneous tissue    Tissue debrided:  Dermis, Epidermis and Subcutaneous    Devitalized tissue debrided:  Exudate and Biofilm    Instruments:  Curette and Nippers    Bleeding:  Minimal  Hemostasis Achieved: Yes    Method Used:  Pressure  Patient tolerance:  Patient tolerated the procedure well with no immediate complications  "

## 2022-10-25 NOTE — PROGRESS NOTES
Subjective:      Patient ID: Richard Bustos is a 77 y.o. male.    Chief Complaint: Foot Ulcer (Left foot 2nd digit wound underneath toe)      HPI Today 10/25: C&S results from culture taken last visit positive for proteus and porphyromonas, patient's was contacted and informed to discontinue clindamycin and start Augmentin sent to his pharmacy. Patient notes no significant changes since his last visit other than great improvement in erythema and drainage. He notes obtaining MRI after last visit which was possibility concerning for reactive changes vs osteomyelitis of the L 2nd proximal phalanx.     Cc2 long thick discolored dystrophic toenails.  Gradual onset, worsening over past several weeks, aggravated by increased weight bearing, shoe gear, pressure.  Periodic debridement helps symptoms.     HPI 10/13: Rapid onset redness pain swelling heat left forefoot.  Patient was seen in the emergency room yesterday for increased pain swelling redness 2nd toe left.  During dressing change the ulcer cord popped and several cc of hansen pus were expressed.  The wound was dressed.  No cultures were taken.  Patient is taking clindamycin 3 times daily.  Has MRI scheduled for this evening.  Denies trauma or other adverse events since last visit.  X-rays negative for gas, foreign body, osteolysis.  Blood work shows elevated creatinine at 1.7    Review of Systems   Constitutional: Negative for chills, fever, malaise/fatigue and night sweats.   Cardiovascular:  Negative for claudication, cyanosis and leg swelling.   Skin:  Positive for poor wound healing. Negative for color change, itching, rash and suspicious lesions.   Musculoskeletal:  Negative for falls, gout, joint pain and myalgias.        L 2nd toe tenderness    Neurological:  Positive for numbness and sensory change.         Objective:      Physical Exam  Constitutional:       General: He is not in acute distress.     Appearance: He is well-developed, well-groomed and  overweight. He is not ill-appearing, toxic-appearing or diaphoretic.   Cardiovascular:      Pulses:           Dorsalis pedis pulses are 1+ on the right side and 1+ on the left side.        Posterior tibial pulses are 1+ on the right side and 1+ on the left side.      Comments: Toes warm, pink, cap fill <5 seconds.  Musculoskeletal:      Comments: Left 2nd toe was dorsal MTP contracture which is reducible hallux valgus deformity also reducible unchanged from baseline.   Feet:      Left foot:      Skin integrity: Ulcer present. No erythema or warmth.   Lymphadenopathy:      Comments: Negative lymphadenopathy bilateral popliteal fossa and tarsal tunnel.     Skin:     General: Skin is warm and dry.      Coloration: Skin is not pale.      Findings: No abrasion, bruising, burn, ecchymosis, erythema, laceration, lesion or rash.      Comments: Full thickness pin point wound sub plantar PIPJ 2nd toe, probes deep to CAPSULE, not bone.   5 x 5 x 5 mm post debridement ( [id number] pre debridement). Tenderness with palpation of L 2nd digit. Localized edema L 2nd digit     No overt erythema (sig improvement since last encounter) or other signs of infection  (No warmth, No Pain, No drainage, No fluctuance or crepitation, No Malodor)    Bilateral toe nails thickened, elongated, discolored, and dystrophic     Otherwise, Skin is thin, and atrophied to lower extremity bilateral    Toenails 1st, 2nd, 3rd, 4th, 5th  bilateral are hypertrophic thickened 2-3 mm, dystrophic, discolored tanish brown with tan, gray crumbly subungual debris.  Tender to distal nail plate pressure, without periungual skin abnormality of each.     Neurological:      Sensory: Sensory deficit present.      Comments: Diminished/loss of protective sensation all toes bilateral to 10 gram monofilament.    Paresthesias, and burning bilateral feet with no clearly identified trigger or source.     Psychiatric:         Behavior: Behavior is cooperative.           Assessment:        No diagnosis found.        Plan:       There are no diagnoses linked to this encounter.    I counseled the patient on his conditions, their implications and medical management.    MRI reviewed with patient     Redressed the wound today. Change with hydrafera wound foam Kerlix and DSD football     Follow up with ID for osteomyelitis recommendations     Continue minimal ambulation surgical shoe left, diabetic shoe custom insert right.      Start and continue Augmentin, optimized per culture from clindamycin     RTC One week, sooner p.r.n..      Patient has been through this several times, we reiterated the need for vigilant monitoring and in the event this is not improved in a few days, and is present in the emergency room for admission consultation with Podiatry Infectious Disease more urgent imaging possible surgical intervention.  Patient and daughter verbalized understanding.      With the patient's permission, I debrided all ten toenails with a sterile nipper and curette, removing all offending nail and debris.  Patient tolerated the procedure well and related significant relief.      No follow-ups on file.

## 2022-10-30 PROBLEM — F33.9 DEPRESSION, RECURRENT: Status: ACTIVE | Noted: 2022-10-30

## 2022-10-31 NOTE — PROGRESS NOTES
Subjective:       Patient ID: Richard Bustos is a 77 y.o. male.    Chief Complaint: Diabetes, Hypertension, Hyperlipidemia, Gastroesophageal Reflux, Paroxysmal atrial fibrillation, Chronic Kidney Disease, Cardiomyopathy, Peripheral Vascular Disease, Prostate Cancer, and Hypercoagulable state    Patient presents here for 6 month follow-up of diabetes, hypertension, hyperlipidemia, paroxysmal atrial fibrillation, GERD, chronic kidney disease, cardiomyopathy, PVD, prostate cancer, and hypercoagulable state.  His weight has decreased 12 lb in the last 6 months and he states he is following his diet better and this has enable him to lose weight.  He still does not really get any exercise.  BMI today is 37.80.  His diabetes has been well controlled as evidenced by his hemoglobin A1c of 5.1% in January 2022.  He denies any hypoglycemia.  He does have diabetic neuropathy as well as diabetic nephropathy but no diabetic retinopathy.  His hypertension is well controlled with his present medications and he is tolerating his medications well.  His hyperlipidemia is also well controlled with his present medication as well as his low-sodium, low-fat low-cholesterol diet.  As far as his atrial fibrillation, his rate is controlled with his present use of carvedilol and he is on Coumadin as a blood thinner.  He is on Coumadin due to the fact that he has MTHFR deficiency resulting in hypercoagulable state.  He does have cardiomyopathy but does not have any signs or symptoms of congestive heart failure.  His GERD is well controlled with his present use of omeprazole 20 mg daily.  His chronic kidney disease is stable and he is followed by Nephrology on a regular basis.  His most recent GFR in October 2022 was 41.  He does have peripheral vascular disease but does not walk far enough to cause any claudication.  His prostate cancer is stable and his PSA is extremely low.  He is being followed by Podiatry at this time for a cellulitis of  his left foot and then recent MRI which showed questionable early osteomyelitis.  He does have an appointment to follow up with Podiatry tomorrow.  He is also scheduled for his eye exam next week for his diabetes.  As far as health maintenance, he is up-to-date with all of his recommended screening exams and immunizations with exception of shingles vaccine, 2nd COVID booster, flu vaccine, eye exam, diabetic urine screen, hemoglobin A1c, and lipid profile.    Review of Systems   Constitutional:  Negative for chills, fatigue, fever and unexpected weight change.        Weight is decreased 12 lb in the last 6 months.   HENT:  Negative for nasal congestion, ear pain, postnasal drip and sore throat.    Eyes:  Negative for pain and visual disturbance.        No history of diabetic retinopathy; he is scheduled for his diabetic eye exam next week   Respiratory:  Negative for cough and shortness of breath.    Cardiovascular:  Positive for leg swelling. Negative for chest pain, palpitations and claudication.   Gastrointestinal:  Negative for abdominal pain, diarrhea, nausea and vomiting.   Endocrine: Negative for polydipsia and polyuria.   Genitourinary:  Negative for difficulty urinating, dysuria, flank pain and frequency.        Nocturia x2 per night   Musculoskeletal:  Positive for back pain. Negative for arthralgias.   Neurological:  Negative for dizziness, syncope, light-headedness and headaches.   Hematological:  Negative for adenopathy. Bruises/bleeds easily.   Psychiatric/Behavioral:  Negative for sleep disturbance. The patient is not nervous/anxious.        Objective:      Physical Exam  Vitals reviewed.   Constitutional:       General: He is not in acute distress.     Appearance: Normal appearance. He is well-developed. He is obese.   HENT:      Right Ear: Tympanic membrane and external ear normal.      Left Ear: Tympanic membrane and external ear normal.      Mouth/Throat:      Pharynx: Oropharynx is clear. No  posterior oropharyngeal erythema.   Neck:      Thyroid: No thyromegaly.      Vascular: No carotid bruit.   Cardiovascular:      Rate and Rhythm: Normal rate and regular rhythm.      Pulses: Normal pulses.      Heart sounds: Normal heart sounds. No murmur heard.  Pulmonary:      Effort: Pulmonary effort is normal.      Breath sounds: Normal breath sounds. No wheezing or rales.   Musculoskeletal:         General: Normal range of motion.      Cervical back: Normal range of motion and neck supple.      Right lower leg: No edema.      Left lower leg: No edema.   Lymphadenopathy:      Cervical: No cervical adenopathy.   Neurological:      General: No focal deficit present.      Mental Status: He is alert and oriented to person, place, and time.      Cranial Nerves: No cranial nerve deficit.      Deep Tendon Reflexes: Reflexes are normal and symmetric.   Psychiatric:         Mood and Affect: Mood normal.         Behavior: Behavior normal.       Assessment:       Problem List Items Addressed This Visit       Diabetes mellitus with chronic kidney disease, stage III - Primary (Chronic)    Relevant Orders    Hemoglobin A1C    MTHFR mutation (methylenetetrahydrofolate reductase)    Hypertension associated with diabetes    Hyperlipidemia associated with type 2 diabetes mellitus    Relevant Orders    Lipid Panel    GERD (gastroesophageal reflux disease)    Hypercoagulable state, Prothrombin mutation    CKD (chronic kidney disease) stage 3, GFR 30-59 ml/min, 41    Diabetic neuropathy    Paroxysmal atrial fibrillation    Prostate cancer    Peripheral vascular disease    Cardiomyopathy    Chronic systolic congestive heart failure    Depression, recurrent         Plan:       1. Continue present medication as all of his chronic medical conditions noted above are stable and controlled  2.  Continue diabetic, low-sodium, low-fat low-cholesterol diet to continue weight loss.  BMI today is 37.80   3. Continue to follow-up with podiatry  regarding his cellulitis and abnormal MRI  4.  High-dose flu vaccine today  5. Schedule lipid profile and hemoglobin A1c  6. Follow up with me in 6 months or p.r.n.

## 2022-11-01 ENCOUNTER — OFFICE VISIT (OUTPATIENT)
Dept: PODIATRY | Facility: CLINIC | Age: 77
End: 2022-11-01
Payer: MEDICARE

## 2022-11-01 VITALS
HEIGHT: 69 IN | SYSTOLIC BLOOD PRESSURE: 155 MMHG | WEIGHT: 256 LBS | DIASTOLIC BLOOD PRESSURE: 75 MMHG | BODY MASS INDEX: 37.92 KG/M2 | HEART RATE: 57 BPM

## 2022-11-01 DIAGNOSIS — I73.9 PVD (PERIPHERAL VASCULAR DISEASE): ICD-10-CM

## 2022-11-01 DIAGNOSIS — Z87.898 HISTORY OF ULCERATION: Primary | ICD-10-CM

## 2022-11-01 DIAGNOSIS — M77.9 CAPSULITIS: ICD-10-CM

## 2022-11-01 DIAGNOSIS — E11.49 TYPE II DIABETES MELLITUS WITH NEUROLOGICAL MANIFESTATIONS: ICD-10-CM

## 2022-11-01 DIAGNOSIS — Z79.01 LONG TERM (CURRENT) USE OF ANTICOAGULANTS: ICD-10-CM

## 2022-11-01 PROCEDURE — 1101F PT FALLS ASSESS-DOCD LE1/YR: CPT | Mod: CPTII,S$GLB,, | Performed by: PODIATRIST

## 2022-11-01 PROCEDURE — 3078F PR MOST RECENT DIASTOLIC BLOOD PRESSURE < 80 MM HG: ICD-10-PCS | Mod: CPTII,S$GLB,, | Performed by: PODIATRIST

## 2022-11-01 PROCEDURE — 1159F PR MEDICATION LIST DOCUMENTED IN MEDICAL RECORD: ICD-10-PCS | Mod: CPTII,S$GLB,, | Performed by: PODIATRIST

## 2022-11-01 PROCEDURE — 3288F PR FALLS RISK ASSESSMENT DOCUMENTED: ICD-10-PCS | Mod: CPTII,S$GLB,, | Performed by: PODIATRIST

## 2022-11-01 PROCEDURE — 99999 PR PBB SHADOW E&M-EST. PATIENT-LVL IV: CPT | Mod: PBBFAC,,, | Performed by: PODIATRIST

## 2022-11-01 PROCEDURE — 1101F PR PT FALLS ASSESS DOC 0-1 FALLS W/OUT INJ PAST YR: ICD-10-PCS | Mod: CPTII,S$GLB,, | Performed by: PODIATRIST

## 2022-11-01 PROCEDURE — 3044F HG A1C LEVEL LT 7.0%: CPT | Mod: CPTII,S$GLB,, | Performed by: PODIATRIST

## 2022-11-01 PROCEDURE — 1159F MED LIST DOCD IN RCRD: CPT | Mod: CPTII,S$GLB,, | Performed by: PODIATRIST

## 2022-11-01 PROCEDURE — 3044F PR MOST RECENT HEMOGLOBIN A1C LEVEL <7.0%: ICD-10-PCS | Mod: CPTII,S$GLB,, | Performed by: PODIATRIST

## 2022-11-01 PROCEDURE — 99214 OFFICE O/P EST MOD 30 MIN: CPT | Mod: S$GLB,,, | Performed by: PODIATRIST

## 2022-11-01 PROCEDURE — 4010F ACE/ARB THERAPY RXD/TAKEN: CPT | Mod: CPTII,S$GLB,, | Performed by: PODIATRIST

## 2022-11-01 PROCEDURE — 3078F DIAST BP <80 MM HG: CPT | Mod: CPTII,S$GLB,, | Performed by: PODIATRIST

## 2022-11-01 PROCEDURE — 99999 PR PBB SHADOW E&M-EST. PATIENT-LVL IV: ICD-10-PCS | Mod: PBBFAC,,, | Performed by: PODIATRIST

## 2022-11-01 PROCEDURE — 1126F AMNT PAIN NOTED NONE PRSNT: CPT | Mod: CPTII,S$GLB,, | Performed by: PODIATRIST

## 2022-11-01 PROCEDURE — 3077F PR MOST RECENT SYSTOLIC BLOOD PRESSURE >= 140 MM HG: ICD-10-PCS | Mod: CPTII,S$GLB,, | Performed by: PODIATRIST

## 2022-11-01 PROCEDURE — 1126F PR PAIN SEVERITY QUANTIFIED, NO PAIN PRESENT: ICD-10-PCS | Mod: CPTII,S$GLB,, | Performed by: PODIATRIST

## 2022-11-01 PROCEDURE — 99214 PR OFFICE/OUTPT VISIT, EST, LEVL IV, 30-39 MIN: ICD-10-PCS | Mod: S$GLB,,, | Performed by: PODIATRIST

## 2022-11-01 PROCEDURE — 3288F FALL RISK ASSESSMENT DOCD: CPT | Mod: CPTII,S$GLB,, | Performed by: PODIATRIST

## 2022-11-01 PROCEDURE — 4010F PR ACE/ARB THEARPY RXD/TAKEN: ICD-10-PCS | Mod: CPTII,S$GLB,, | Performed by: PODIATRIST

## 2022-11-01 PROCEDURE — 3077F SYST BP >= 140 MM HG: CPT | Mod: CPTII,S$GLB,, | Performed by: PODIATRIST

## 2022-11-01 RX ORDER — AMOXICILLIN AND CLAVULANATE POTASSIUM 500; 125 MG/1; MG/1
1 TABLET, FILM COATED ORAL 2 TIMES DAILY
Qty: 20 TABLET | Refills: 0 | Status: SHIPPED | OUTPATIENT
Start: 2022-11-01 | End: 2022-11-11

## 2022-11-01 NOTE — PROGRESS NOTES
Subjective:      Patient ID: Richard Bustos is a 77 y.o. male.    Chief Complaint: Foot Ulcer (Left foot 2nd digit wound underneath toe)      Chronic ulcer plantar left 2nd toe.  Gradual onset, improving well with antibiotic therapy and wound care.  Dressing clean dry intact.  Taking Augmentin twice daily.  This covers the cultures.  Awaiting infectious disease appointment November 15th    Cc2  sharp deep pain bottom of the left forefoot.  Gradual onset, worsening over the past week or so.  Aggravated by increased weight-bearing prolonged standing some shoes-in this case the surgical shoe.  No prior medical treatment.  No self-treatment.  Patient denies trauma and surgery left foot.    Review of Systems   Constitutional: Negative for chills, fever, malaise/fatigue and night sweats.   Cardiovascular:  Negative for claudication, cyanosis and leg swelling.   Skin:  Positive for poor wound healing. Negative for color change, itching, rash and suspicious lesions.   Musculoskeletal:  Positive for joint pain. Negative for falls, gout and myalgias.        L 2nd toe tenderness    Neurological:  Positive for numbness and sensory change.         Objective:      Physical Exam  Constitutional:       General: He is not in acute distress.     Appearance: He is well-developed, well-groomed and overweight. He is not ill-appearing, toxic-appearing or diaphoretic.   Cardiovascular:      Pulses:           Dorsalis pedis pulses are 1+ on the right side and 1+ on the left side.        Posterior tibial pulses are 1+ on the right side and 1+ on the left side.      Comments: Toes warm, pink, cap fill <5 seconds.  Musculoskeletal:      Comments: Left 2nd toe was dorsal MTP contracture which is reducible hallux valgus deformity also reducible unchanged from baseline.    Pain to palpation inferior mtpj 2 left without evidence of trauma or infection.       Feet:      Left foot:      Skin integrity: Ulcer present. No erythema or warmth.    Lymphadenopathy:      Comments: Negative lymphadenopathy bilateral popliteal fossa and tarsal tunnel.     Skin:     General: Skin is warm and dry.      Coloration: Skin is not pale.      Findings: No abrasion, bruising, burn, ecchymosis, erythema, laceration, lesion or rash.      Comments: Wound:  plantar left 2nd toe    Size:    Pre:[id number]      Base:  Granular, pink with moderate serous/serosanaguinous drainage only.  No pus, tracking, fluctuance, malodor, or cardinal signs infection.    Borders:  Hyperkeratotic, debriding to flat, pink, blanchable skin edges without undermining.        No overt erythema (sig improvement since last encounter) or other signs of infection  (No warmth, No Pain, No drainage, No fluctuance or crepitation, No Malodor)    Bilateral toe nails thickened, elongated, discolored, and dystrophic     Otherwise, Skin is thin, and atrophied to lower extremity bilateral    Toenails 1st, 2nd, 3rd, 4th, 5th  bilateral are hypertrophic thickened 2-3 mm, dystrophic, discolored tanish brown with tan, gray crumbly subungual debris.  Tender to distal nail plate pressure, without periungual skin abnormality of each.     Neurological:      Sensory: Sensory deficit present.      Comments: Diminished/loss of protective sensation all toes bilateral to 10 gram monofilament.    Paresthesias, and burning bilateral feet with no clearly identified trigger or source.     Psychiatric:         Behavior: Behavior is cooperative.           Assessment:       Encounter Diagnoses   Name Primary?    History of ulceration Yes    Type II diabetes mellitus with neurological manifestations     Long term (current) use of anticoagulants     PVD (peripheral vascular disease)     Capsulitis            Plan:       Richard was seen today for foot ulcer.    Diagnoses and all orders for this visit:    History of ulceration    Type II diabetes mellitus with neurological manifestations    Long term (current) use of anticoagulants    PVD  (peripheral vascular disease)    Capsulitis      I counseled the patient on his conditions, their implications and medical management.    Refill augmentin    Redressed the wound today. Change with hydrafera wound foam Kerlix and DSD football -careful attention was made to use 2 layers quarter-inch adhesive foam in the arch to help offload the 2nd MTP.    Follow up with ID for osteomyelitis recommendations 11/15/2022    Continue minimal ambulation surgical shoe left, diabetic shoe custom insert right.      continue Augmentin, optimized per culture from clindamycin     RTC One week, sooner p.r.n..      Patient has been through this several times, we reiterated the need for vigilant monitoring and in the event this is not improved in a few days, and is present in the emergency room for admission consultation with Podiatry Infectious Disease more urgent imaging possible surgical intervention.  Patient and daughter verbalized understanding.          Follow up in about 1 week (around 11/8/2022).

## 2022-11-03 ENCOUNTER — LAB VISIT (OUTPATIENT)
Dept: LAB | Facility: HOSPITAL | Age: 77
End: 2022-11-03
Attending: UROLOGY
Payer: MEDICARE

## 2022-11-03 DIAGNOSIS — C61 PROSTATE CANCER: ICD-10-CM

## 2022-11-03 LAB — COMPLEXED PSA SERPL-MCNC: 0.03 NG/ML (ref 0–4)

## 2022-11-03 PROCEDURE — 84153 ASSAY OF PSA TOTAL: CPT | Performed by: UROLOGY

## 2022-11-03 PROCEDURE — 36415 COLL VENOUS BLD VENIPUNCTURE: CPT | Performed by: UROLOGY

## 2022-11-07 DIAGNOSIS — I87.2 STASIS DERMATITIS OF BOTH LEGS: ICD-10-CM

## 2022-11-07 RX ORDER — NYSTATIN 100000 [USP'U]/G
POWDER TOPICAL 2 TIMES DAILY
Qty: 60 G | Refills: 11 | Status: ON HOLD | OUTPATIENT
Start: 2022-11-07 | End: 2023-04-03 | Stop reason: HOSPADM

## 2022-11-08 ENCOUNTER — OFFICE VISIT (OUTPATIENT)
Dept: PODIATRY | Facility: CLINIC | Age: 77
End: 2022-11-08
Payer: MEDICARE

## 2022-11-08 ENCOUNTER — PATIENT MESSAGE (OUTPATIENT)
Dept: UROLOGY | Facility: CLINIC | Age: 77
End: 2022-11-08
Payer: MEDICARE

## 2022-11-08 VITALS
BODY MASS INDEX: 37.92 KG/M2 | HEART RATE: 54 BPM | SYSTOLIC BLOOD PRESSURE: 156 MMHG | HEIGHT: 69 IN | DIASTOLIC BLOOD PRESSURE: 81 MMHG | WEIGHT: 256 LBS

## 2022-11-08 DIAGNOSIS — Z87.898 HISTORY OF ULCERATION: Primary | ICD-10-CM

## 2022-11-08 DIAGNOSIS — E11.9 TYPE 2 DIABETES MELLITUS WITHOUT COMPLICATION, UNSPECIFIED WHETHER LONG TERM INSULIN USE: ICD-10-CM

## 2022-11-08 PROCEDURE — 3288F FALL RISK ASSESSMENT DOCD: CPT | Mod: CPTII,S$GLB,, | Performed by: PODIATRIST

## 2022-11-08 PROCEDURE — 3079F DIAST BP 80-89 MM HG: CPT | Mod: CPTII,S$GLB,, | Performed by: PODIATRIST

## 2022-11-08 PROCEDURE — 1159F PR MEDICATION LIST DOCUMENTED IN MEDICAL RECORD: ICD-10-PCS | Mod: CPTII,S$GLB,, | Performed by: PODIATRIST

## 2022-11-08 PROCEDURE — 1101F PT FALLS ASSESS-DOCD LE1/YR: CPT | Mod: CPTII,S$GLB,, | Performed by: PODIATRIST

## 2022-11-08 PROCEDURE — 4010F PR ACE/ARB THEARPY RXD/TAKEN: ICD-10-PCS | Mod: CPTII,S$GLB,, | Performed by: PODIATRIST

## 2022-11-08 PROCEDURE — 1126F AMNT PAIN NOTED NONE PRSNT: CPT | Mod: CPTII,S$GLB,, | Performed by: PODIATRIST

## 2022-11-08 PROCEDURE — 4010F ACE/ARB THERAPY RXD/TAKEN: CPT | Mod: CPTII,S$GLB,, | Performed by: PODIATRIST

## 2022-11-08 PROCEDURE — 1160F RVW MEDS BY RX/DR IN RCRD: CPT | Mod: CPTII,S$GLB,, | Performed by: PODIATRIST

## 2022-11-08 PROCEDURE — 1160F PR REVIEW ALL MEDS BY PRESCRIBER/CLIN PHARMACIST DOCUMENTED: ICD-10-PCS | Mod: CPTII,S$GLB,, | Performed by: PODIATRIST

## 2022-11-08 PROCEDURE — 3079F PR MOST RECENT DIASTOLIC BLOOD PRESSURE 80-89 MM HG: ICD-10-PCS | Mod: CPTII,S$GLB,, | Performed by: PODIATRIST

## 2022-11-08 PROCEDURE — 1101F PR PT FALLS ASSESS DOC 0-1 FALLS W/OUT INJ PAST YR: ICD-10-PCS | Mod: CPTII,S$GLB,, | Performed by: PODIATRIST

## 2022-11-08 PROCEDURE — 99213 PR OFFICE/OUTPT VISIT, EST, LEVL III, 20-29 MIN: ICD-10-PCS | Mod: S$GLB,,, | Performed by: PODIATRIST

## 2022-11-08 PROCEDURE — 3044F PR MOST RECENT HEMOGLOBIN A1C LEVEL <7.0%: ICD-10-PCS | Mod: CPTII,S$GLB,, | Performed by: PODIATRIST

## 2022-11-08 PROCEDURE — 1126F PR PAIN SEVERITY QUANTIFIED, NO PAIN PRESENT: ICD-10-PCS | Mod: CPTII,S$GLB,, | Performed by: PODIATRIST

## 2022-11-08 PROCEDURE — 1159F MED LIST DOCD IN RCRD: CPT | Mod: CPTII,S$GLB,, | Performed by: PODIATRIST

## 2022-11-08 PROCEDURE — 99213 OFFICE O/P EST LOW 20 MIN: CPT | Mod: S$GLB,,, | Performed by: PODIATRIST

## 2022-11-08 PROCEDURE — 99999 PR PBB SHADOW E&M-EST. PATIENT-LVL IV: ICD-10-PCS | Mod: PBBFAC,,, | Performed by: PODIATRIST

## 2022-11-08 PROCEDURE — 3288F PR FALLS RISK ASSESSMENT DOCUMENTED: ICD-10-PCS | Mod: CPTII,S$GLB,, | Performed by: PODIATRIST

## 2022-11-08 PROCEDURE — 3077F SYST BP >= 140 MM HG: CPT | Mod: CPTII,S$GLB,, | Performed by: PODIATRIST

## 2022-11-08 PROCEDURE — 3077F PR MOST RECENT SYSTOLIC BLOOD PRESSURE >= 140 MM HG: ICD-10-PCS | Mod: CPTII,S$GLB,, | Performed by: PODIATRIST

## 2022-11-08 PROCEDURE — 99999 PR PBB SHADOW E&M-EST. PATIENT-LVL IV: CPT | Mod: PBBFAC,,, | Performed by: PODIATRIST

## 2022-11-08 PROCEDURE — 3044F HG A1C LEVEL LT 7.0%: CPT | Mod: CPTII,S$GLB,, | Performed by: PODIATRIST

## 2022-11-08 RX ORDER — OMEPRAZOLE 20 MG/1
20 CAPSULE, DELAYED RELEASE ORAL DAILY
Qty: 90 CAPSULE | Refills: 3 | Status: ON HOLD | OUTPATIENT
Start: 2022-11-08 | End: 2023-04-28 | Stop reason: HOSPADM

## 2022-11-08 NOTE — PROGRESS NOTES
Subjective:      Patient ID: Richard Bustos is a 77 y.o. male.    Chief Complaint: Foot Ulcer (Left foot 2nd digit wound underneath toe)    Chronic ulcer plantar left 2nd toe continues to be fully epithelized/healed.  Continues taking Augmentin twice daily. This covers the cultures. Awaiting infectious disease appointment November 15th    Review of Systems   Constitutional: Negative for chills, fever, malaise/fatigue and night sweats.   Cardiovascular:  Negative for claudication, cyanosis and leg swelling.   Skin:  Positive for poor wound healing. Negative for color change, itching, rash and suspicious lesions.   Musculoskeletal:  Positive for joint pain. Negative for falls, gout and myalgias.        L 2nd toe tenderness    Neurological:  Positive for numbness and sensory change.         Objective:      Physical Exam  Constitutional:       General: He is not in acute distress.     Appearance: He is well-developed, well-groomed and overweight. He is not ill-appearing, toxic-appearing or diaphoretic.   Cardiovascular:      Pulses:           Dorsalis pedis pulses are 1+ on the right side and 1+ on the left side.        Posterior tibial pulses are 1+ on the right side and 1+ on the left side.      Comments: Toes warm, pink, cap fill <5 seconds.  Musculoskeletal:      Comments: Left 2nd toe was dorsal MTP contracture which is reducible hallux valgus deformity also reducible unchanged from baseline.    Pain to palpation inferior mtpj 2 left without evidence of trauma or infection.       Feet:      Left foot:      Skin integrity: Ulcer present. No erythema or warmth.   Lymphadenopathy:      Comments: Negative lymphadenopathy bilateral popliteal fossa and tarsal tunnel.     Skin:     General: Skin is warm and dry.      Coloration: Skin is not pale.      Findings: No abrasion, bruising, burn, ecchymosis, erythema, laceration, lesion or rash.      Comments: Wound:  plantar left 2nd toe now resolved/healed    Size:   Closed/epithelized     Bilateral toe nails thickened, elongated, discolored, and dystrophic     Otherwise, Skin is thin, and atrophied to lower extremity bilateral    Toenails 1st, 2nd, 3rd, 4th, 5th  bilateral are hypertrophic thickened 2-3 mm, dystrophic, discolored tanish brown with tan, gray crumbly subungual debris.  Tender to distal nail plate pressure, without periungual skin abnormality of each.     Neurological:      Sensory: Sensory deficit present.      Comments: Diminished/loss of protective sensation all toes bilateral to 10 gram monofilament.    Paresthesias, and burning bilateral feet with no clearly identified trigger or source.     Psychiatric:         Behavior: Behavior is cooperative.           Assessment:       Encounter Diagnoses   Name Primary?    History of ulceration Yes    Type 2 diabetes mellitus without complication, unspecified whether long term insulin use              Plan:       Richard was seen today for foot ulcer.    Diagnoses and all orders for this visit:    History of ulceration    Type 2 diabetes mellitus without complication, unspecified whether long term insulin use      I counseled the patient on his conditions, their implications and medical management.    Football dressing discontinued, Redressed in forefoot protective light dressing, kaye foam and hydrafera blue.     Follow up with ID for osteomyelitis recommendations 11/15/2022    WBAT b/l diabetic shoes custom inserts    continue Augmentin, optimized per culture from clindamycin     RTC two week, sooner p.r.n..      Patient has been through this several times, we reiterated the need for vigilant monitoring and in the event this is not improved in a few days, and is present in the emergency room for admission consultation with Podiatry Infectious Disease more urgent imaging possible surgical intervention.  Patient and daughter verbalized understanding.          No follow-ups on file.        : Beka MARMOLEJO  Erwin JEANM PGY 2  Supervising Attending: Iván Torrez DPM

## 2022-11-08 NOTE — TELEPHONE ENCOUNTER
Care Due:                  Date            Visit Type   Department     Provider  --------------------------------------------------------------------------------                                EP -                              PRIMARY      [facility]  Last Visit: 10-      CARE ([facility])   PRACTICE       Familia Ramirez                              EP -                              PRIMARY      [facility]  Next Visit: 05-      CARE ([facility])   PRACTICE       Familia Ramirez                                                            Last  Test          Frequency    Reason                     Performed    Due Date  --------------------------------------------------------------------------------    Lipid Panel.  12 months..  atorvastatin.............  03-   03-    [facility] Embedded Care Gaps. Reference number: [id number]. 11/08/2022   9:19:39 AM CST

## 2022-11-10 ENCOUNTER — PATIENT MESSAGE (OUTPATIENT)
Dept: PAIN MEDICINE | Facility: CLINIC | Age: 77
End: 2022-11-10
Payer: MEDICARE

## 2022-11-10 DIAGNOSIS — D50.9 IRON DEFICIENCY ANEMIA: ICD-10-CM

## 2022-11-10 RX ORDER — MECOBAL/LEVOMEFOLAT CA/B6 PHOS 2-3-35 MG
1 TABLET ORAL 2 TIMES DAILY
Qty: 180 TABLET | Refills: 3 | Status: ON HOLD | OUTPATIENT
Start: 2022-11-10 | End: 2023-04-28 | Stop reason: HOSPADM

## 2022-11-14 ENCOUNTER — PATIENT MESSAGE (OUTPATIENT)
Dept: PODIATRY | Facility: CLINIC | Age: 77
End: 2022-11-14
Payer: MEDICARE

## 2022-11-15 ENCOUNTER — OFFICE VISIT (OUTPATIENT)
Dept: INFECTIOUS DISEASES | Facility: CLINIC | Age: 77
End: 2022-11-15
Payer: MEDICARE

## 2022-11-15 VITALS
SYSTOLIC BLOOD PRESSURE: 146 MMHG | WEIGHT: 255.94 LBS | BODY MASS INDEX: 37.91 KG/M2 | TEMPERATURE: 98 F | HEART RATE: 63 BPM | DIASTOLIC BLOOD PRESSURE: 71 MMHG | HEIGHT: 69 IN

## 2022-11-15 DIAGNOSIS — Z79.899 MEDICATION MANAGEMENT: Primary | ICD-10-CM

## 2022-11-15 DIAGNOSIS — M86.9 OSTEOMYELITIS, UNSPECIFIED SITE, UNSPECIFIED TYPE: ICD-10-CM

## 2022-11-15 DIAGNOSIS — L97.522 ULCER OF TOE, LEFT, WITH FAT LAYER EXPOSED: ICD-10-CM

## 2022-11-15 DIAGNOSIS — E11.49 TYPE II DIABETES MELLITUS WITH NEUROLOGICAL MANIFESTATIONS: ICD-10-CM

## 2022-11-15 PROCEDURE — 3288F PR FALLS RISK ASSESSMENT DOCUMENTED: ICD-10-PCS | Mod: CPTII,S$GLB,, | Performed by: REGISTERED NURSE

## 2022-11-15 PROCEDURE — 4010F ACE/ARB THERAPY RXD/TAKEN: CPT | Mod: CPTII,S$GLB,, | Performed by: REGISTERED NURSE

## 2022-11-15 PROCEDURE — 1100F PR PT FALLS ASSESS DOC 2+ FALLS/FALL W/INJURY/YR: ICD-10-PCS | Mod: CPTII,S$GLB,, | Performed by: REGISTERED NURSE

## 2022-11-15 PROCEDURE — 3044F HG A1C LEVEL LT 7.0%: CPT | Mod: CPTII,S$GLB,, | Performed by: REGISTERED NURSE

## 2022-11-15 PROCEDURE — 4010F PR ACE/ARB THEARPY RXD/TAKEN: ICD-10-PCS | Mod: CPTII,S$GLB,, | Performed by: REGISTERED NURSE

## 2022-11-15 PROCEDURE — 1125F AMNT PAIN NOTED PAIN PRSNT: CPT | Mod: CPTII,S$GLB,, | Performed by: REGISTERED NURSE

## 2022-11-15 PROCEDURE — 1100F PTFALLS ASSESS-DOCD GE2>/YR: CPT | Mod: CPTII,S$GLB,, | Performed by: REGISTERED NURSE

## 2022-11-15 PROCEDURE — 99999 PR PBB SHADOW E&M-EST. PATIENT-LVL V: ICD-10-PCS | Mod: PBBFAC,,, | Performed by: REGISTERED NURSE

## 2022-11-15 PROCEDURE — 3078F DIAST BP <80 MM HG: CPT | Mod: CPTII,S$GLB,, | Performed by: REGISTERED NURSE

## 2022-11-15 PROCEDURE — 99203 PR OFFICE/OUTPT VISIT, NEW, LEVL III, 30-44 MIN: ICD-10-PCS | Mod: S$GLB,,, | Performed by: REGISTERED NURSE

## 2022-11-15 PROCEDURE — 1159F MED LIST DOCD IN RCRD: CPT | Mod: CPTII,S$GLB,, | Performed by: REGISTERED NURSE

## 2022-11-15 PROCEDURE — 3044F PR MOST RECENT HEMOGLOBIN A1C LEVEL <7.0%: ICD-10-PCS | Mod: CPTII,S$GLB,, | Performed by: REGISTERED NURSE

## 2022-11-15 PROCEDURE — 1125F PR PAIN SEVERITY QUANTIFIED, PAIN PRESENT: ICD-10-PCS | Mod: CPTII,S$GLB,, | Performed by: REGISTERED NURSE

## 2022-11-15 PROCEDURE — 1159F PR MEDICATION LIST DOCUMENTED IN MEDICAL RECORD: ICD-10-PCS | Mod: CPTII,S$GLB,, | Performed by: REGISTERED NURSE

## 2022-11-15 PROCEDURE — 3077F SYST BP >= 140 MM HG: CPT | Mod: CPTII,S$GLB,, | Performed by: REGISTERED NURSE

## 2022-11-15 PROCEDURE — 3078F PR MOST RECENT DIASTOLIC BLOOD PRESSURE < 80 MM HG: ICD-10-PCS | Mod: CPTII,S$GLB,, | Performed by: REGISTERED NURSE

## 2022-11-15 PROCEDURE — 3077F PR MOST RECENT SYSTOLIC BLOOD PRESSURE >= 140 MM HG: ICD-10-PCS | Mod: CPTII,S$GLB,, | Performed by: REGISTERED NURSE

## 2022-11-15 PROCEDURE — 3288F FALL RISK ASSESSMENT DOCD: CPT | Mod: CPTII,S$GLB,, | Performed by: REGISTERED NURSE

## 2022-11-15 PROCEDURE — 99999 PR PBB SHADOW E&M-EST. PATIENT-LVL V: CPT | Mod: PBBFAC,,, | Performed by: REGISTERED NURSE

## 2022-11-15 PROCEDURE — 99203 OFFICE O/P NEW LOW 30 MIN: CPT | Mod: S$GLB,,, | Performed by: REGISTERED NURSE

## 2022-11-15 RX ORDER — AMOXICILLIN AND CLAVULANATE POTASSIUM 875; 125 MG/1; MG/1
1 TABLET, FILM COATED ORAL 2 TIMES DAILY
Qty: 38 TABLET | Refills: 0 | Status: SHIPPED | OUTPATIENT
Start: 2022-11-15 | End: 2022-12-04

## 2022-11-15 NOTE — PROGRESS NOTES
Subjective:       Patient ID: Richard Bustos is a 77 y.o. male.    Chief Complaint: Toe Ulcer    HPI    Mr Bustos is a 77 year old male with a PMH of DM, CKD, PAD, PVD, aortic aneurysm (thoracic), HTN, HLD, anticoagulants use, OA, cardiomyopathy, hx of MI, BPH, afib, bilateral hip replacement who presented as a referral from podiatry for left 2nd toe wound. Pt has been followed with podiatry for this toe wound since Aug 2022. Per chart review, it appears pt's daughter discovered wound, unknown cause. Pt was initially treated with clindamycin with noted improvement. See abx history below. Pt wound reoccured in October, and was re cultured (+Proteus Mirabilis and porphyromonas somerae) and xray and MRI of the left foot were obtained. On MRI, osteo was unable to be excluded. Since August, pt has received 2 rounds of clindamycin and 1 round of doxy. He was recently switched to Augmentin on 10/23 for anaerobic coverage. So far, patient has completed 3 weeks of Augmentin. Pt was referred to potential osteomyelitis of the left 2nd toe.     Today, pt denies drainage from wound. States wound has healed, denies any opening. Denies pain but does report peripheral neuropathy. Reports swelling to the 2nd left toe. Reports tolerance with Augmentin. Denies fevers, body aches, chills. Denies NVD. Reports that he sustained a fall yesterday, reporting that he hit his head and left wrist. Denies head aches, dizziness, trouble vision. Reports left wrist/forearm pain. Denies seeking treatment.     Spoke with Dr. Torrez prior to pt appt. Pt's wound appears to be healing nicely. Reports the surrounding tissue have appeared healthy at last appts.       Micro hx:  8/24 - proteus mirabilis, pansensitive  10/18- proteus mirabilis, pansensitive & porphyromonas somerae     Abx hx:  8/22-9-22 - clindamycin   10/13-10/24 - clindamycin   10/19 - 10/24 - doxy   10/23 - 11/11 - Augmentin     Imaging hx:  10/13 - MRI - Marrow edema within the 2nd  proximal phalanx. could not exlude early osteo. No fluid collection.   10/13 - Xray - generalized osteopenia. Degenerative change at 1st MTP.       11/15/22:             Review of Systems   Constitutional:  Negative for chills, diaphoresis, fatigue and fever.   HENT: Negative.     Eyes: Negative.    Respiratory:  Negative for cough, chest tightness and shortness of breath.    Cardiovascular:  Positive for leg swelling (PAD). Negative for chest pain and palpitations.   Gastrointestinal:  Positive for constipation and diarrhea. Negative for abdominal pain, nausea and vomiting.   Genitourinary:  Positive for difficulty urinating and urgency. Negative for dysuria and hematuria.   Musculoskeletal:  Positive for arthralgias and back pain. Negative for myalgias.        Left arm pain      Allergic/Immunologic: Negative for environmental allergies and food allergies.   Neurological:  Positive for dizziness. Negative for weakness, numbness and headaches.   Hematological:  Bruises/bleeds easily.   Psychiatric/Behavioral:  Negative for agitation and confusion. The patient is not nervous/anxious.        Objective:      Physical Exam  Vitals reviewed.   Constitutional:       General: He is not in acute distress.     Appearance: Normal appearance. He is well-developed. He is obese. He is not ill-appearing, toxic-appearing or diaphoretic.   HENT:      Head: Normocephalic and atraumatic.      Nose: Nose normal.      Mouth/Throat:      Dentition: Normal dentition. Does not have dentures. No dental caries or dental abscesses.      Pharynx: No oropharyngeal exudate.   Eyes:      General: No scleral icterus.     Conjunctiva/sclera: Conjunctivae normal.   Cardiovascular:      Rate and Rhythm: Normal rate and regular rhythm.      Heart sounds: Normal heart sounds. No murmur heard.  Pulmonary:      Effort: Pulmonary effort is normal. No respiratory distress.      Breath sounds: Normal breath sounds. No wheezing or rales.   Abdominal:       General: Bowel sounds are normal. There is no distension.      Palpations: Abdomen is soft.      Tenderness: There is no abdominal tenderness. There is no guarding.   Musculoskeletal:      Cervical back: Normal range of motion.      Right lower leg: Edema present.      Left lower leg: Edema present.   Skin:     General: Skin is warm and dry.      Findings: Bruising present. No erythema, lesion or rash.      Comments: Left second toe with swelling and mild erythema, see media. No wound appreciated.     Pt with generalized bruising and dry, cracked skin.     Bilateral lower legs with mild edema and red/brawny venous insufficiency dermatitis.    Neurological:      General: No focal deficit present.      Mental Status: He is alert and oriented to person, place, and time. Mental status is at baseline.      Motor: Weakness present.      Gait: Gait abnormal (walk with cane).   Psychiatric:         Mood and Affect: Mood normal.         Behavior: Behavior normal.         Thought Content: Thought content normal.         Judgment: Judgment normal.       Assessment:         Visit Diagnoses       Medication management    -  Primary    Relevant Medications    amoxicillin-clavulanate 875-125mg (AUGMENTIN) 875-125 mg per tablet    Other Relevant Orders    CBC Auto Differential    Comprehensive Metabolic Panel    C-reactive protein    Ulcer of toe, left, with fat layer exposed        Relevant Medications    amoxicillin-clavulanate 875-125mg (AUGMENTIN) 875-125 mg per tablet    Other Relevant Orders    CBC Auto Differential    Comprehensive Metabolic Panel    C-reactive protein    Type II diabetes mellitus with neurological manifestations        Relevant Medications    amoxicillin-clavulanate 875-125mg (AUGMENTIN) 875-125 mg per tablet    Other Relevant Orders    CBC Auto Differential    Comprehensive Metabolic Panel    C-reactive protein    Osteomyelitis, unspecified site, unspecified type        Relevant Medications     amoxicillin-clavulanate 875-125mg (AUGMENTIN) 875-125 mg per tablet    Other Relevant Orders    CBC Auto Differential    Comprehensive Metabolic Panel    C-reactive protein            Plan:       Recommendations:     - Recommend treating 6 weeks of abx for osteomyelitis course. Can continue Augmentin. 3 weeks to go. EOC Dec 4th.   - Labs today - CBC, CMP, CRP  - Return to clinic in December for end of care  - Continue to see Dr. Torrez q2 weeks.   - Continue wound care  - Continue daily foot checks. Educated on importance on monitoring for new wound development and seeking care ASAP if seen.   - Call or seek immediate medical attention for any fevers, chills, sweats, diarrhea, n/v or increase in pain, swelling, drainage from wound.   - Recommend pt to seek care for recent fall ASAP- rec emergency room for eval d/t recent fall with hitting head/wrist.   - Questions encouraged and answered. Pt states understanding of plan.         30 minutes of total time was spent on this encounter, which includes face to face time and non-face to face time preparing to see the patient (eg, review of tests), Obtaining and/or reviewing separately obtained history, documenting clinical information in the electronic or other health record, independently interpreting results (not separately reported) and communicating results to the patient/family/caregiver, or care coordination (not separately reported).

## 2022-11-17 ENCOUNTER — OFFICE VISIT (OUTPATIENT)
Dept: UROLOGY | Facility: CLINIC | Age: 77
End: 2022-11-17
Payer: MEDICARE

## 2022-11-17 VITALS
SYSTOLIC BLOOD PRESSURE: 132 MMHG | DIASTOLIC BLOOD PRESSURE: 81 MMHG | HEIGHT: 69 IN | WEIGHT: 251 LBS | BODY MASS INDEX: 37.18 KG/M2 | HEART RATE: 69 BPM

## 2022-11-17 DIAGNOSIS — N32.81 OAB (OVERACTIVE BLADDER): Primary | ICD-10-CM

## 2022-11-17 DIAGNOSIS — C61 PROSTATE CANCER: ICD-10-CM

## 2022-11-17 DIAGNOSIS — N28.1 COMPLEX RENAL CYST: ICD-10-CM

## 2022-11-17 LAB
BILIRUBIN, UA POC OHS: NEGATIVE
BLOOD, UA POC OHS: NEGATIVE
CLARITY, UA POC OHS: CLEAR
COLOR, UA POC OHS: YELLOW
GLUCOSE, UA POC OHS: NEGATIVE
KETONES, UA POC OHS: NEGATIVE
LEUKOCYTES, UA POC OHS: NEGATIVE
NITRITE, UA POC OHS: NEGATIVE
PH, UA POC OHS: 5.5
PROTEIN, UA POC OHS: 100
SPECIFIC GRAVITY, UA POC OHS: 1.02
UROBILINOGEN, UA POC OHS: 0.2

## 2022-11-17 PROCEDURE — 1126F AMNT PAIN NOTED NONE PRSNT: CPT | Mod: CPTII,S$GLB,, | Performed by: UROLOGY

## 2022-11-17 PROCEDURE — 4010F ACE/ARB THERAPY RXD/TAKEN: CPT | Mod: CPTII,S$GLB,, | Performed by: UROLOGY

## 2022-11-17 PROCEDURE — 99213 OFFICE O/P EST LOW 20 MIN: CPT | Mod: S$GLB,,, | Performed by: UROLOGY

## 2022-11-17 PROCEDURE — 99999 PR PBB SHADOW E&M-EST. PATIENT-LVL III: CPT | Mod: PBBFAC,,, | Performed by: UROLOGY

## 2022-11-17 PROCEDURE — 3079F DIAST BP 80-89 MM HG: CPT | Mod: CPTII,S$GLB,, | Performed by: UROLOGY

## 2022-11-17 PROCEDURE — 4010F PR ACE/ARB THEARPY RXD/TAKEN: ICD-10-PCS | Mod: CPTII,S$GLB,, | Performed by: UROLOGY

## 2022-11-17 PROCEDURE — 1159F MED LIST DOCD IN RCRD: CPT | Mod: CPTII,S$GLB,, | Performed by: UROLOGY

## 2022-11-17 PROCEDURE — 3288F FALL RISK ASSESSMENT DOCD: CPT | Mod: CPTII,S$GLB,, | Performed by: UROLOGY

## 2022-11-17 PROCEDURE — 3079F PR MOST RECENT DIASTOLIC BLOOD PRESSURE 80-89 MM HG: ICD-10-PCS | Mod: CPTII,S$GLB,, | Performed by: UROLOGY

## 2022-11-17 PROCEDURE — 99213 PR OFFICE/OUTPT VISIT, EST, LEVL III, 20-29 MIN: ICD-10-PCS | Mod: S$GLB,,, | Performed by: UROLOGY

## 2022-11-17 PROCEDURE — 1126F PR PAIN SEVERITY QUANTIFIED, NO PAIN PRESENT: ICD-10-PCS | Mod: CPTII,S$GLB,, | Performed by: UROLOGY

## 2022-11-17 PROCEDURE — 99999 PR PBB SHADOW E&M-EST. PATIENT-LVL III: ICD-10-PCS | Mod: PBBFAC,,, | Performed by: UROLOGY

## 2022-11-17 PROCEDURE — 1160F RVW MEDS BY RX/DR IN RCRD: CPT | Mod: CPTII,S$GLB,, | Performed by: UROLOGY

## 2022-11-17 PROCEDURE — 1101F PT FALLS ASSESS-DOCD LE1/YR: CPT | Mod: CPTII,S$GLB,, | Performed by: UROLOGY

## 2022-11-17 PROCEDURE — 3288F PR FALLS RISK ASSESSMENT DOCUMENTED: ICD-10-PCS | Mod: CPTII,S$GLB,, | Performed by: UROLOGY

## 2022-11-17 PROCEDURE — 81003 POCT URINALYSIS(INSTRUMENT): ICD-10-PCS | Mod: QW,S$GLB,, | Performed by: UROLOGY

## 2022-11-17 PROCEDURE — 3044F PR MOST RECENT HEMOGLOBIN A1C LEVEL <7.0%: ICD-10-PCS | Mod: CPTII,S$GLB,, | Performed by: UROLOGY

## 2022-11-17 PROCEDURE — 3044F HG A1C LEVEL LT 7.0%: CPT | Mod: CPTII,S$GLB,, | Performed by: UROLOGY

## 2022-11-17 PROCEDURE — 1160F PR REVIEW ALL MEDS BY PRESCRIBER/CLIN PHARMACIST DOCUMENTED: ICD-10-PCS | Mod: CPTII,S$GLB,, | Performed by: UROLOGY

## 2022-11-17 PROCEDURE — 1101F PR PT FALLS ASSESS DOC 0-1 FALLS W/OUT INJ PAST YR: ICD-10-PCS | Mod: CPTII,S$GLB,, | Performed by: UROLOGY

## 2022-11-17 PROCEDURE — 3075F SYST BP GE 130 - 139MM HG: CPT | Mod: CPTII,S$GLB,, | Performed by: UROLOGY

## 2022-11-17 PROCEDURE — 1159F PR MEDICATION LIST DOCUMENTED IN MEDICAL RECORD: ICD-10-PCS | Mod: CPTII,S$GLB,, | Performed by: UROLOGY

## 2022-11-17 PROCEDURE — 3075F PR MOST RECENT SYSTOLIC BLOOD PRESS GE 130-139MM HG: ICD-10-PCS | Mod: CPTII,S$GLB,, | Performed by: UROLOGY

## 2022-11-17 PROCEDURE — 81003 URINALYSIS AUTO W/O SCOPE: CPT | Mod: QW,S$GLB,, | Performed by: UROLOGY

## 2022-11-17 NOTE — PATIENT INSTRUCTIONS
1. OAB (overactive bladder)    2. Prostate cancer          Plan:     Psa the same since jan 2022. Gl 8 prostate cancer s/p XRT in 2016 + ADT x 2.5 years. Can change to psa diagnostic yearly. Next psa due 11/2023  Rbus 2 years after last (due jan 2023)  to monitor RLP complex cyst- monitor every 2 years since unchanged.   Continue myrbetriq 50mg daily for overactive bladder     F/u in 1 year with diagnostic psa prior

## 2022-11-17 NOTE — PROGRESS NOTES
[facility] Note - [place]  Staff: MD Isa  PCP: Familia Ramirez Jr, MD  Date of Service: 11/17/2022      Subjective:        HPI: Richard Bustos is a 77 y.o. male     Interval history 1/21/21:  He has a h/o UUI but was only voiding 4x a day. Was having 1 leak a day if he held it too long. Had put him on myrbetriq 50mg which helped some? Today he states wears thin pad (1 a day) with occ leaks unchanged and voids about 4x a day with rare urgency if he holds too long again.     He has a h/o Gl 4 prostate cancer sp XRT completed in 2016 and ADT x 2.5 years. Most recent psa <0.1.    Also has a h/o complex right renal cyst. Last rbus 1/14/2021 shows  There are a couple of simple renal cyst present with the largest measuring 5.6 x 4.4 x 4.4 cm.  Within the lower pole, there appears to be a complex hypoechoic cyst measuring 2.8 x 3.4 x 4.7 cm in size, not significantly changed when compared to the prior study when this measured 4.2 x 4.1 x 4.5 cm.  No renal stone. No hydronephrosis. Left side previously showed stone but this one did not.     I reviewed his previous urinalysis and cultures as well as his urinalysis today. His urinalysis today shows no abnormalities except 100 protein.     Interval history 10/14/21:  psa was 0.01 on 1/28/21  3/24/21 restart mybetriq 50mg once daily again. Had stopped bc unsure if it was helping but appears it was helping since daughter states leakage and frequency worsened off of it.   4/26/21 saw marin and pvr: 0. Still having reanna urgency but went over some basic recs.   psa 0.02 on 7/29, then 8/18 then 10/7 (not sure it was checked so manytimes)    Interval history 1/25/22:  On myrbetriq 50mg daily no longer leaking urine. No urine frequency.   rbus 1/14/21 for RLP complex cyst: complex hypoechoic cyst measuring 2.8 x 3.4 x 4.7 cm in size, not significantly changed since June 2020 when compared to the prior study when this measured 4.2 x 4.1 x 4.5 cm. b  simple renal cysts.   psa 1/4/22 0.03 (rising)  Just released from hospital for sepsis secondary to strep (ua was neg)     Interval history 4/26/22:  4/13/22  psa 0.03 psa the same.   No complaints right now. Doing well with no incontinence.   Void: neg    Interval history 11/17/22:  Had him Continue myrbetriq 50mg daily for oab and urge incontinence. Has accident 1-2x/day (usually just 1x a day). Hard to get to bathroom bc he wears suspenders making it hard to get down.   Repeat psa 11/3/22 0.03 (same since jan 2022).    Current REVIEW OF SYSTEMS: as above    PSA History: no fam hx of prostate cancer   11/3/22 0.03  4/13/22 0.03  1/25/22 Pvr: 197 but voided 2 hours ago  1/4/22  0.03  Component PSA Diagnostic   Latest Ref Rng & Units 0.00 - 4.00 ng/mL   10/7/2021 0.02   8/18/2021 0.02   7/29/2021 0.02   1/28/2021 0.01   1/14/2021 <0.10   6/4/2020 <0.10   12/2/2019 <0.10   5/10/2019 <0.10   4/24/2019 <0.01   4/23/2019 <0.10   1/15/2019 <0.01   10/16/2018 <0.01   7/5/2018 <0.01   1/4/2018 <0.01   11/21/2017 6/12/2017 <0.01   12/12/2016  Gl 4 prostate cancer sp XRT completed in 2016 and ADT x 2.5 years <0.01     8/1/2016 3.3   3/18/2016 7.7 (H)     Urine history:   1/6/22 1+prot    Objective:     Vitals:    11/17/22 1548   BP: 132/81   Pulse: 69         Assessment:     Richard Bustos is a 77 y.o. male with     1. OAB (overactive bladder)    2. Prostate cancer    3. Complex renal cyst          Plan:     Psa the same since jan 2022. Gl 8 prostate cancer s/p XRT in 2016 + ADT x 2.5 years. Can change to psa diagnostic yearly. Next psa due 11/2023  Rbus 2 years after last (due jan 2023)  to monitor RLP complex cyst- monitor every 2 years since unchanged.   Continue myrbetriq 50mg daily for overactive bladder     F/u in 1 year with diagnostic psa prior     Adeline Moss MD

## 2022-11-29 ENCOUNTER — OFFICE VISIT (OUTPATIENT)
Dept: PODIATRY | Facility: CLINIC | Age: 77
End: 2022-11-29
Payer: MEDICARE

## 2022-11-29 VITALS
WEIGHT: 251 LBS | DIASTOLIC BLOOD PRESSURE: 81 MMHG | HEART RATE: 59 BPM | HEIGHT: 69 IN | SYSTOLIC BLOOD PRESSURE: 163 MMHG | BODY MASS INDEX: 37.18 KG/M2

## 2022-11-29 DIAGNOSIS — I73.9 PVD (PERIPHERAL VASCULAR DISEASE): ICD-10-CM

## 2022-11-29 DIAGNOSIS — E11.9 TYPE 2 DIABETES MELLITUS WITHOUT COMPLICATION, UNSPECIFIED WHETHER LONG TERM INSULIN USE: ICD-10-CM

## 2022-11-29 DIAGNOSIS — Z79.01 LONG TERM (CURRENT) USE OF ANTICOAGULANTS: ICD-10-CM

## 2022-11-29 DIAGNOSIS — Z87.898 HISTORY OF ULCERATION: Primary | ICD-10-CM

## 2022-11-29 PROCEDURE — 3288F FALL RISK ASSESSMENT DOCD: CPT | Mod: CPTII,S$GLB,, | Performed by: PODIATRIST

## 2022-11-29 PROCEDURE — 11042 DBRDMT SUBQ TIS 1ST 20SQCM/<: CPT | Mod: S$GLB,,, | Performed by: PODIATRIST

## 2022-11-29 PROCEDURE — 1126F PR PAIN SEVERITY QUANTIFIED, NO PAIN PRESENT: ICD-10-PCS | Mod: CPTII,S$GLB,, | Performed by: PODIATRIST

## 2022-11-29 PROCEDURE — 99212 OFFICE O/P EST SF 10 MIN: CPT | Mod: 25,S$GLB,, | Performed by: PODIATRIST

## 2022-11-29 PROCEDURE — 99212 PR OFFICE/OUTPT VISIT, EST, LEVL II, 10-19 MIN: ICD-10-PCS | Mod: 25,S$GLB,, | Performed by: PODIATRIST

## 2022-11-29 PROCEDURE — 4010F ACE/ARB THERAPY RXD/TAKEN: CPT | Mod: CPTII,S$GLB,, | Performed by: PODIATRIST

## 2022-11-29 PROCEDURE — 1101F PR PT FALLS ASSESS DOC 0-1 FALLS W/OUT INJ PAST YR: ICD-10-PCS | Mod: CPTII,S$GLB,, | Performed by: PODIATRIST

## 2022-11-29 PROCEDURE — 1126F AMNT PAIN NOTED NONE PRSNT: CPT | Mod: CPTII,S$GLB,, | Performed by: PODIATRIST

## 2022-11-29 PROCEDURE — 1159F MED LIST DOCD IN RCRD: CPT | Mod: CPTII,S$GLB,, | Performed by: PODIATRIST

## 2022-11-29 PROCEDURE — 3288F PR FALLS RISK ASSESSMENT DOCUMENTED: ICD-10-PCS | Mod: CPTII,S$GLB,, | Performed by: PODIATRIST

## 2022-11-29 PROCEDURE — 1101F PT FALLS ASSESS-DOCD LE1/YR: CPT | Mod: CPTII,S$GLB,, | Performed by: PODIATRIST

## 2022-11-29 PROCEDURE — 1159F PR MEDICATION LIST DOCUMENTED IN MEDICAL RECORD: ICD-10-PCS | Mod: CPTII,S$GLB,, | Performed by: PODIATRIST

## 2022-11-29 PROCEDURE — 3044F PR MOST RECENT HEMOGLOBIN A1C LEVEL <7.0%: ICD-10-PCS | Mod: CPTII,S$GLB,, | Performed by: PODIATRIST

## 2022-11-29 PROCEDURE — 3079F DIAST BP 80-89 MM HG: CPT | Mod: CPTII,S$GLB,, | Performed by: PODIATRIST

## 2022-11-29 PROCEDURE — 3077F PR MOST RECENT SYSTOLIC BLOOD PRESSURE >= 140 MM HG: ICD-10-PCS | Mod: CPTII,S$GLB,, | Performed by: PODIATRIST

## 2022-11-29 PROCEDURE — 3044F HG A1C LEVEL LT 7.0%: CPT | Mod: CPTII,S$GLB,, | Performed by: PODIATRIST

## 2022-11-29 PROCEDURE — 99999 PR PBB SHADOW E&M-EST. PATIENT-LVL IV: ICD-10-PCS | Mod: PBBFAC,,, | Performed by: PODIATRIST

## 2022-11-29 PROCEDURE — 4010F PR ACE/ARB THEARPY RXD/TAKEN: ICD-10-PCS | Mod: CPTII,S$GLB,, | Performed by: PODIATRIST

## 2022-11-29 PROCEDURE — 3079F PR MOST RECENT DIASTOLIC BLOOD PRESSURE 80-89 MM HG: ICD-10-PCS | Mod: CPTII,S$GLB,, | Performed by: PODIATRIST

## 2022-11-29 PROCEDURE — 11042 WOUND DEBRIDEMENT: ICD-10-PCS | Mod: S$GLB,,, | Performed by: PODIATRIST

## 2022-11-29 PROCEDURE — 99999 PR PBB SHADOW E&M-EST. PATIENT-LVL IV: CPT | Mod: PBBFAC,,, | Performed by: PODIATRIST

## 2022-11-29 PROCEDURE — 3077F SYST BP >= 140 MM HG: CPT | Mod: CPTII,S$GLB,, | Performed by: PODIATRIST

## 2022-11-29 NOTE — PROCEDURES
"Wound Debridement    Date/Time: 11/29/2022 9:29 AM  Performed by: Iván Torrez DPM  Authorized by: Iván Torrez DPM     Time out: Immediately prior to procedure a "time out" was called to verify the correct patient, procedure, equipment, support staff and site/side marked as required.    Consent Done?:  Yes (Verbal)  Local anesthesia used?: No      Wound Details:    Location:  Left foot    Location:  Left 2nd Toe    Type of Debridement:  Excisional       Length (cm):  0.3       Area (sq cm):  0.09       Width (cm):  0.3       Percent Debrided (%):  100       Depth (cm):  0.2       Total Area Debrided (sq cm):  0.09    Depth of debridement:  Subcutaneous tissue    Tissue debrided:  Dermis, Epidermis and Subcutaneous    Devitalized tissue debrided:  Biofilm and Slough    Instruments:  Nippers    Bleeding:  Minimal  Hemostasis Achieved: Yes    Method Used:  Pressure  Patient tolerance:  Patient tolerated the procedure well with no immediate complications  "

## 2022-11-29 NOTE — PROGRESS NOTES
Subjective:      Patient ID: Richard Bustos is a 77 y.o. male.    Chief Complaint: Foot Pain (Left Foot)    Mild redness and possible ulcer bottom 2nd toe left foot.  Unknown onset, discovered 2 days ago.  Aggravated with increased weight-bearing prolonged standing some shoes.  No prior medical treatment.  No self-treatment.  Denies trauma and surgery.  This has been a location of recurrent ulceration past several months.    Review of Systems   Constitutional: Negative for chills, diaphoresis, fever, malaise/fatigue and night sweats.   Cardiovascular:  Positive for leg swelling. Negative for claudication, cyanosis and syncope.   Skin:  Positive for poor wound healing. Negative for color change, dry skin, nail changes, rash, suspicious lesions and unusual hair distribution.   Musculoskeletal:  Negative for falls, joint pain, joint swelling, muscle cramps, muscle weakness and stiffness.   Gastrointestinal:  Negative for constipation, diarrhea, nausea and vomiting.   Neurological:  Positive for numbness, paresthesias and sensory change. Negative for brief paralysis, disturbances in coordination, focal weakness and tremors.         Objective:      Physical Exam  Constitutional:       General: He is not in acute distress.     Appearance: He is well-developed. He is not diaphoretic.   Cardiovascular:      Pulses:           Dorsalis pedis pulses are 1+ on the right side and 1+ on the left side.        Posterior tibial pulses are 1+ on the right side and 1+ on the left side.      Comments: Toes warm, pink, cap fill <5 seconds.  Musculoskeletal:      Comments: Second toe overlaps 1st in the left foot.   Lymphadenopathy:      Comments: Negative lymphadenopathy bilateral popliteal fossa and tarsal tunnel.     Skin:     General: Skin is warm and dry.      Coloration: Skin is not pale.      Findings: No abrasion, bruising, burn, ecchymosis, erythema, laceration, lesion or rash.      Comments:     Wound:  Plantar 2nd PIPJ  left    Size:    Pre:[id number]  Post: [id number]    Base:  Granular, pink with moderate serous/serosanaguinous drainage only.  No pus, tracking, fluctuance, malodor, or cardinal signs infection.    Borders:  Hyperkeratotic, debriding to flat, pink, blanchable skin edges without undermining.       Neurological:      Sensory: Sensory deficit present.      Comments: Diminished/loss of protective sensation all toes bilateral to 10 gram monofilament.     Psychiatric:         Behavior: Behavior is cooperative.           Assessment:       Encounter Diagnoses   Name Primary?    History of ulceration Yes    Type 2 diabetes mellitus without complication, unspecified whether long term insulin use     Long term (current) use of anticoagulants     PVD (peripheral vascular disease)          Plan:       Richard was seen today for foot pain.    Diagnoses and all orders for this visit:    History of ulceration    Type 2 diabetes mellitus without complication, unspecified whether long term insulin use    Long term (current) use of anticoagulants    PVD (peripheral vascular disease)      I counseled the patient on his conditions, their implications and medical management.    Debrided wound left 2nd toe.    Dressed wound 2nd toe left with Jayde, Crow foam, football.  Dispense surgical shoe left.  Ambulate to tolerance and same with diabetic shoes custom insert right.      Follow up 1 week, sooner p.r.n.          No follow-ups on file.

## 2022-12-02 ENCOUNTER — PATIENT MESSAGE (OUTPATIENT)
Dept: UROLOGY | Facility: CLINIC | Age: 77
End: 2022-12-02
Payer: MEDICARE

## 2022-12-02 DIAGNOSIS — G89.4 CHRONIC PAIN DISORDER: ICD-10-CM

## 2022-12-05 ENCOUNTER — PATIENT MESSAGE (OUTPATIENT)
Dept: INFECTIOUS DISEASES | Facility: CLINIC | Age: 77
End: 2022-12-05
Payer: MEDICARE

## 2022-12-05 ENCOUNTER — PATIENT MESSAGE (OUTPATIENT)
Dept: PAIN MEDICINE | Facility: CLINIC | Age: 77
End: 2022-12-05
Payer: MEDICARE

## 2022-12-05 RX ORDER — HYDROCODONE BITARTRATE AND ACETAMINOPHEN 5; 325 MG/1; MG/1
1 TABLET ORAL EVERY 8 HOURS PRN
Qty: 90 TABLET | Refills: 0 | OUTPATIENT
Start: 2022-12-05

## 2022-12-07 ENCOUNTER — OFFICE VISIT (OUTPATIENT)
Dept: INFECTIOUS DISEASES | Facility: CLINIC | Age: 77
End: 2022-12-07
Payer: MEDICARE

## 2022-12-07 ENCOUNTER — PATIENT MESSAGE (OUTPATIENT)
Dept: INFECTIOUS DISEASES | Facility: CLINIC | Age: 77
End: 2022-12-07

## 2022-12-07 DIAGNOSIS — L97.522 ULCER OF TOE, LEFT, WITH FAT LAYER EXPOSED: Primary | ICD-10-CM

## 2022-12-07 PROCEDURE — 99499 NO LOS: ICD-10-PCS | Mod: 95,,, | Performed by: STUDENT IN AN ORGANIZED HEALTH CARE EDUCATION/TRAINING PROGRAM

## 2022-12-07 PROCEDURE — 99499 UNLISTED E&M SERVICE: CPT | Mod: 95,,, | Performed by: STUDENT IN AN ORGANIZED HEALTH CARE EDUCATION/TRAINING PROGRAM

## 2022-12-08 ENCOUNTER — PATIENT MESSAGE (OUTPATIENT)
Dept: PODIATRY | Facility: CLINIC | Age: 77
End: 2022-12-08

## 2022-12-08 ENCOUNTER — OFFICE VISIT (OUTPATIENT)
Dept: PODIATRY | Facility: CLINIC | Age: 77
End: 2022-12-08
Payer: MEDICARE

## 2022-12-08 VITALS
SYSTOLIC BLOOD PRESSURE: 147 MMHG | WEIGHT: 256.88 LBS | HEART RATE: 56 BPM | BODY MASS INDEX: 38.05 KG/M2 | HEIGHT: 69 IN | DIASTOLIC BLOOD PRESSURE: 74 MMHG

## 2022-12-08 DIAGNOSIS — I73.9 PVD (PERIPHERAL VASCULAR DISEASE): ICD-10-CM

## 2022-12-08 DIAGNOSIS — Z79.01 LONG TERM (CURRENT) USE OF ANTICOAGULANTS: ICD-10-CM

## 2022-12-08 DIAGNOSIS — E11.49 TYPE II DIABETES MELLITUS WITH NEUROLOGICAL MANIFESTATIONS: ICD-10-CM

## 2022-12-08 DIAGNOSIS — Z87.898 HISTORY OF ULCERATION: Primary | ICD-10-CM

## 2022-12-08 PROCEDURE — 3078F PR MOST RECENT DIASTOLIC BLOOD PRESSURE < 80 MM HG: ICD-10-PCS | Mod: CPTII,S$GLB,, | Performed by: PODIATRIST

## 2022-12-08 PROCEDURE — 99213 OFFICE O/P EST LOW 20 MIN: CPT | Mod: S$GLB,,, | Performed by: PODIATRIST

## 2022-12-08 PROCEDURE — 1126F PR PAIN SEVERITY QUANTIFIED, NO PAIN PRESENT: ICD-10-PCS | Mod: CPTII,S$GLB,, | Performed by: PODIATRIST

## 2022-12-08 PROCEDURE — 3078F DIAST BP <80 MM HG: CPT | Mod: CPTII,S$GLB,, | Performed by: PODIATRIST

## 2022-12-08 PROCEDURE — 99999 PR PBB SHADOW E&M-EST. PATIENT-LVL IV: ICD-10-PCS | Mod: PBBFAC,,, | Performed by: PODIATRIST

## 2022-12-08 PROCEDURE — 1101F PR PT FALLS ASSESS DOC 0-1 FALLS W/OUT INJ PAST YR: ICD-10-PCS | Mod: CPTII,S$GLB,, | Performed by: PODIATRIST

## 2022-12-08 PROCEDURE — 99999 PR PBB SHADOW E&M-EST. PATIENT-LVL IV: CPT | Mod: PBBFAC,,, | Performed by: PODIATRIST

## 2022-12-08 PROCEDURE — 3077F PR MOST RECENT SYSTOLIC BLOOD PRESSURE >= 140 MM HG: ICD-10-PCS | Mod: CPTII,S$GLB,, | Performed by: PODIATRIST

## 2022-12-08 PROCEDURE — 1126F AMNT PAIN NOTED NONE PRSNT: CPT | Mod: CPTII,S$GLB,, | Performed by: PODIATRIST

## 2022-12-08 PROCEDURE — 4010F PR ACE/ARB THEARPY RXD/TAKEN: ICD-10-PCS | Mod: CPTII,S$GLB,, | Performed by: PODIATRIST

## 2022-12-08 PROCEDURE — 3288F PR FALLS RISK ASSESSMENT DOCUMENTED: ICD-10-PCS | Mod: CPTII,S$GLB,, | Performed by: PODIATRIST

## 2022-12-08 PROCEDURE — 1159F PR MEDICATION LIST DOCUMENTED IN MEDICAL RECORD: ICD-10-PCS | Mod: CPTII,S$GLB,, | Performed by: PODIATRIST

## 2022-12-08 PROCEDURE — 3288F FALL RISK ASSESSMENT DOCD: CPT | Mod: CPTII,S$GLB,, | Performed by: PODIATRIST

## 2022-12-08 PROCEDURE — 3044F HG A1C LEVEL LT 7.0%: CPT | Mod: CPTII,S$GLB,, | Performed by: PODIATRIST

## 2022-12-08 PROCEDURE — 1101F PT FALLS ASSESS-DOCD LE1/YR: CPT | Mod: CPTII,S$GLB,, | Performed by: PODIATRIST

## 2022-12-08 PROCEDURE — 3044F PR MOST RECENT HEMOGLOBIN A1C LEVEL <7.0%: ICD-10-PCS | Mod: CPTII,S$GLB,, | Performed by: PODIATRIST

## 2022-12-08 PROCEDURE — 4010F ACE/ARB THERAPY RXD/TAKEN: CPT | Mod: CPTII,S$GLB,, | Performed by: PODIATRIST

## 2022-12-08 PROCEDURE — 1159F MED LIST DOCD IN RCRD: CPT | Mod: CPTII,S$GLB,, | Performed by: PODIATRIST

## 2022-12-08 PROCEDURE — 3077F SYST BP >= 140 MM HG: CPT | Mod: CPTII,S$GLB,, | Performed by: PODIATRIST

## 2022-12-08 PROCEDURE — 99213 PR OFFICE/OUTPT VISIT, EST, LEVL III, 20-29 MIN: ICD-10-PCS | Mod: S$GLB,,, | Performed by: PODIATRIST

## 2022-12-08 NOTE — PROGRESS NOTES
Subjective:      Patient ID: Richard Bustos is a 77 y.o. male.    Chief Complaint: Foot Ulcer    Chronic recurring ulcer bottom 2nd toe left foot.  Unknown onset, treating with offloading and football dressing past one-week improved.  Aggravated with increased weight-bearing prolonged standing some shoes.  Protected weight-bearing in surgical shoe wound care and offloading and football dressing have helped No self-treatment.  Denies trauma and surgery.  This has been a location of recurrent ulceration past several months.    This patient has chronic instability multiple orthopedic surgeries limited mobility and need for gait assist are currently managed by primary care, orthopedics.  He walks with a cane relates relative stability despite recent fall.  He declined evaluation by  After his fall according to his daughter.    I have been in communication with infectious disease who had a video visit scheduled with the patient yesterday at which the patient was not able to connect with the provider.  He related the website was engaged any saw the advertisements for [facility], however that was never provider he was able to see.    Review of Systems   Constitutional: Negative for chills, diaphoresis, fever, malaise/fatigue and night sweats.   Cardiovascular:  Positive for leg swelling. Negative for claudication, cyanosis and syncope.   Skin:  Positive for poor wound healing. Negative for color change, dry skin, nail changes, rash, suspicious lesions and unusual hair distribution.   Musculoskeletal:  Negative for falls, joint pain, joint swelling, muscle cramps, muscle weakness and stiffness.   Gastrointestinal:  Negative for constipation, diarrhea, nausea and vomiting.   Neurological:  Positive for numbness, paresthesias and sensory change. Negative for brief paralysis, disturbances in coordination, focal weakness and tremors.         Objective:      Physical Exam  Constitutional:       General: He is not in acute  distress.     Appearance: He is well-developed. He is not diaphoretic.   Cardiovascular:      Pulses:           Dorsalis pedis pulses are 1+ on the right side and 1+ on the left side.        Posterior tibial pulses are 1+ on the right side and 1+ on the left side.      Comments: Toes warm, pink, cap fill <5 seconds.  Musculoskeletal:      Comments: Second toe overlaps 1st in the left foot.   Lymphadenopathy:      Comments: Negative lymphadenopathy bilateral popliteal fossa and tarsal tunnel.     Skin:     General: Skin is warm and dry.      Coloration: Skin is not pale.      Findings: No abrasion, bruising, burn, ecchymosis, erythema, laceration, lesion or rash.      Comments:     Wound plantar 2nd pipj left closed today, epithelialized  without ulceration, drainage, pus, tracking, fluctuance, malodor, or cardinal signs infection.    Skin of both feet is thin shiny and atrophic but without erythema edema open skin heat pain or loss of function today.   Neurological:      Sensory: Sensory deficit present.      Comments: Diminished/loss of protective sensation all toes bilateral to 10 gram monofilament.    Paresthesias, and burning bilateral feet with no clearly identified trigger or source.     Psychiatric:         Behavior: Behavior is cooperative.           Assessment:       Encounter Diagnoses   Name Primary?    History of ulceration Yes    Long term (current) use of anticoagulants     PVD (peripheral vascular disease)     Type II diabetes mellitus with neurological manifestations          Plan:       Richard was seen today for foot ulcer.    Diagnoses and all orders for this visit:    History of ulceration    Long term (current) use of anticoagulants    PVD (peripheral vascular disease)    Type II diabetes mellitus with neurological manifestations    I counseled the patient on his conditions, their implications and medical management.    Please reschedule appointment with Infectious Disease video visit or  face-to-face as they are able to accommodate.    Dressed wound site 2nd toe left with Hydrofera blue, football.  Continue surgical shoe left.  Ambulate to tolerance and same with diabetic shoes custom insert right.      Follow up 1 week, sooner p.r.n.          No follow-ups on file.

## 2022-12-09 ENCOUNTER — TELEPHONE (OUTPATIENT)
Dept: INFECTIOUS DISEASES | Facility: CLINIC | Age: 77
End: 2022-12-09
Payer: MEDICARE

## 2022-12-09 NOTE — PROGRESS NOTES
Patient had scheduled appt for virtual visit 12/07/22 @2PM. Patient arrived on time, but unfortunately due to some exponential delays in clinic that day, I was unable to sign on until 2:30pm; at which time patient had already signed off. Attempted twice that day to contact patient to conduct virtual visit, or reschedule if needed. I have called patient again today 12/09/22 12:30pm, to conduct virtual eval, or reschedule. However, each time, no answer, but was able to leave voice message for patient to return call.   --Patient has seen Dr. Torrez 12/08/22, and per notes, wound healing well, skin intact. Patient completed 6wk course of Augmentin (EOC 12/04) for possible OM of left, 2nd toe, proximal phalanx demonstrated marrow edema on past MRI-- which may be reactive in the context of surrounding cellulitis w/o abscess; but unable to r/o OM. It appears bone biopsy deferred at the time, favoring oral abx with Augmentin, with ID f/u with repeat labs, and continued podiatry f/u (Dr. Torrez).   --Repeat labs 12/05: CMP, CBC, CRP wnl, improved, consistent with resolution of SSTI w/ possible OM; but at minimum a virtual visit required to correlate clinically, and determine optimal ID plan of care for patient.   -- If no s/sxs suggestive of ongoing active infection, then no further abx recommended; and should maintain f/u with podiatry as indicated.   --Patient to return call, to reschedule virtual or clinic appt, to ensure no further abx / ID management indicated.

## 2022-12-15 ENCOUNTER — OFFICE VISIT (OUTPATIENT)
Dept: PODIATRY | Facility: CLINIC | Age: 77
End: 2022-12-15
Payer: MEDICARE

## 2022-12-15 VITALS
DIASTOLIC BLOOD PRESSURE: 68 MMHG | WEIGHT: 256.81 LBS | HEIGHT: 69 IN | HEART RATE: 49 BPM | BODY MASS INDEX: 38.04 KG/M2 | SYSTOLIC BLOOD PRESSURE: 144 MMHG

## 2022-12-15 DIAGNOSIS — I73.9 PVD (PERIPHERAL VASCULAR DISEASE): ICD-10-CM

## 2022-12-15 DIAGNOSIS — Z79.01 LONG TERM (CURRENT) USE OF ANTICOAGULANTS: ICD-10-CM

## 2022-12-15 DIAGNOSIS — E11.49 TYPE II DIABETES MELLITUS WITH NEUROLOGICAL MANIFESTATIONS: ICD-10-CM

## 2022-12-15 DIAGNOSIS — Z87.898 HISTORY OF ULCERATION: Primary | ICD-10-CM

## 2022-12-15 PROCEDURE — 4010F ACE/ARB THERAPY RXD/TAKEN: CPT | Mod: CPTII,S$GLB,, | Performed by: PODIATRIST

## 2022-12-15 PROCEDURE — 1159F PR MEDICATION LIST DOCUMENTED IN MEDICAL RECORD: ICD-10-PCS | Mod: CPTII,S$GLB,, | Performed by: PODIATRIST

## 2022-12-15 PROCEDURE — 3078F PR MOST RECENT DIASTOLIC BLOOD PRESSURE < 80 MM HG: ICD-10-PCS | Mod: CPTII,S$GLB,, | Performed by: PODIATRIST

## 2022-12-15 PROCEDURE — 1101F PT FALLS ASSESS-DOCD LE1/YR: CPT | Mod: CPTII,S$GLB,, | Performed by: PODIATRIST

## 2022-12-15 PROCEDURE — 3288F PR FALLS RISK ASSESSMENT DOCUMENTED: ICD-10-PCS | Mod: CPTII,S$GLB,, | Performed by: PODIATRIST

## 2022-12-15 PROCEDURE — 3077F SYST BP >= 140 MM HG: CPT | Mod: CPTII,S$GLB,, | Performed by: PODIATRIST

## 2022-12-15 PROCEDURE — 3044F PR MOST RECENT HEMOGLOBIN A1C LEVEL <7.0%: ICD-10-PCS | Mod: CPTII,S$GLB,, | Performed by: PODIATRIST

## 2022-12-15 PROCEDURE — 1126F AMNT PAIN NOTED NONE PRSNT: CPT | Mod: CPTII,S$GLB,, | Performed by: PODIATRIST

## 2022-12-15 PROCEDURE — 1160F PR REVIEW ALL MEDS BY PRESCRIBER/CLIN PHARMACIST DOCUMENTED: ICD-10-PCS | Mod: CPTII,S$GLB,, | Performed by: PODIATRIST

## 2022-12-15 PROCEDURE — 1159F MED LIST DOCD IN RCRD: CPT | Mod: CPTII,S$GLB,, | Performed by: PODIATRIST

## 2022-12-15 PROCEDURE — 99999 PR PBB SHADOW E&M-EST. PATIENT-LVL IV: ICD-10-PCS | Mod: PBBFAC,,, | Performed by: PODIATRIST

## 2022-12-15 PROCEDURE — 99213 OFFICE O/P EST LOW 20 MIN: CPT | Mod: S$GLB,,, | Performed by: PODIATRIST

## 2022-12-15 PROCEDURE — 3288F FALL RISK ASSESSMENT DOCD: CPT | Mod: CPTII,S$GLB,, | Performed by: PODIATRIST

## 2022-12-15 PROCEDURE — 3078F DIAST BP <80 MM HG: CPT | Mod: CPTII,S$GLB,, | Performed by: PODIATRIST

## 2022-12-15 PROCEDURE — 4010F PR ACE/ARB THEARPY RXD/TAKEN: ICD-10-PCS | Mod: CPTII,S$GLB,, | Performed by: PODIATRIST

## 2022-12-15 PROCEDURE — 1160F RVW MEDS BY RX/DR IN RCRD: CPT | Mod: CPTII,S$GLB,, | Performed by: PODIATRIST

## 2022-12-15 PROCEDURE — 3044F HG A1C LEVEL LT 7.0%: CPT | Mod: CPTII,S$GLB,, | Performed by: PODIATRIST

## 2022-12-15 PROCEDURE — 1101F PR PT FALLS ASSESS DOC 0-1 FALLS W/OUT INJ PAST YR: ICD-10-PCS | Mod: CPTII,S$GLB,, | Performed by: PODIATRIST

## 2022-12-15 PROCEDURE — 1126F PR PAIN SEVERITY QUANTIFIED, NO PAIN PRESENT: ICD-10-PCS | Mod: CPTII,S$GLB,, | Performed by: PODIATRIST

## 2022-12-15 PROCEDURE — 3077F PR MOST RECENT SYSTOLIC BLOOD PRESSURE >= 140 MM HG: ICD-10-PCS | Mod: CPTII,S$GLB,, | Performed by: PODIATRIST

## 2022-12-15 PROCEDURE — 99213 PR OFFICE/OUTPT VISIT, EST, LEVL III, 20-29 MIN: ICD-10-PCS | Mod: S$GLB,,, | Performed by: PODIATRIST

## 2022-12-15 PROCEDURE — 99999 PR PBB SHADOW E&M-EST. PATIENT-LVL IV: CPT | Mod: PBBFAC,,, | Performed by: PODIATRIST

## 2022-12-15 NOTE — PROGRESS NOTES
Subjective:      Patient ID: Richard Bustos is a 77 y.o. male.    Chief Complaint: Foot Ulcer      Chronic recurring ulcer bottom 2nd toe left foot.  Unknown onset, treating with offloading and football dressing past one-week improved.  Aggravated with increased weight-bearing prolonged standing some shoes.  Protected weight-bearing in surgical shoe wound care and offloading and football dressing have helped No self-treatment.  Denies trauma and surgery.  This has been a location of recurrent ulceration past several months.    This patient has chronic instability multiple orthopedic surgeries limited mobility and need for gait assist are currently managed by primary care, orthopedics.  He walks with a cane relates relative stability despite recent fall.  He declined evaluation by  After his fall according to his daughter.      Review of Systems   Constitutional: Negative for chills, diaphoresis, fever, malaise/fatigue and night sweats.   Cardiovascular:  Positive for leg swelling. Negative for claudication, cyanosis and syncope.   Skin:  Positive for poor wound healing. Negative for color change, dry skin, nail changes, rash, suspicious lesions and unusual hair distribution.   Musculoskeletal:  Negative for falls, joint pain, joint swelling, muscle cramps, muscle weakness and stiffness.   Gastrointestinal:  Negative for constipation, diarrhea, nausea and vomiting.   Neurological:  Positive for numbness, paresthesias and sensory change. Negative for brief paralysis, disturbances in coordination, focal weakness and tremors.         Objective:      Physical Exam  Constitutional:       General: He is not in acute distress.     Appearance: He is well-developed. He is not diaphoretic.   Cardiovascular:      Pulses:           Dorsalis pedis pulses are 1+ on the right side and 1+ on the left side.        Posterior tibial pulses are 1+ on the right side and 1+ on the left side.      Comments: Toes warm, pink, cap fill <5  seconds.  Musculoskeletal:      Comments: Second toe overlaps 1st in the left foot.   Lymphadenopathy:      Comments: Negative lymphadenopathy bilateral popliteal fossa and tarsal tunnel.     Skin:     General: Skin is warm and dry.      Coloration: Skin is not pale.      Findings: No abrasion, bruising, burn, ecchymosis, erythema, laceration, lesion or rash.      Comments:     Wound plantar 2nd pipj left remains closed today, epithelialized  without ulceration, drainage, pus, tracking, fluctuance, malodor, or cardinal signs infection.    Skin of both feet is thin shiny and atrophic but without erythema edema open skin heat pain or loss of function today.   Neurological:      Sensory: Sensory deficit present.      Comments: Diminished/loss of protective sensation all toes bilateral to 10 gram monofilament.    Paresthesias, and burning bilateral feet with no clearly identified trigger or source.     Psychiatric:         Behavior: Behavior is cooperative.           Assessment:       No diagnosis found.        Plan:       There are no diagnoses linked to this encounter.    I counseled the patient on his conditions, their implications and medical management.    Please reschedule appointment with Infectious Disease video visit or face-to-face as they are able to accommodate.    Dressed wound site 2nd toe left with Hydrofera blue will then between the toes and beneath the 2nd to protect it.  Patient may gradually and cautiously return to diabetic shoe gear with neoprene uppers and custom insert.  He should inspect the feet multiple times daily.      Inspect feet multiple times daily for signs of occurrence/recurrence ulceration. .      Follow up 1 week, sooner p.r.n.          No follow-ups on file.

## 2023-01-01 ENCOUNTER — PATIENT MESSAGE (OUTPATIENT)
Dept: PAIN MEDICINE | Facility: CLINIC | Age: 78
End: 2023-01-01
Payer: MEDICARE

## 2023-01-04 ENCOUNTER — OFFICE VISIT (OUTPATIENT)
Dept: PAIN MEDICINE | Facility: CLINIC | Age: 78
End: 2023-01-04
Payer: MEDICARE

## 2023-01-04 VITALS — WEIGHT: 256 LBS | HEIGHT: 69 IN | BODY MASS INDEX: 37.92 KG/M2

## 2023-01-04 DIAGNOSIS — G89.4 CHRONIC PAIN DISORDER: ICD-10-CM

## 2023-01-04 DIAGNOSIS — M25.561 CHRONIC PAIN OF BOTH KNEES: ICD-10-CM

## 2023-01-04 DIAGNOSIS — M48.00 CENTRAL STENOSIS OF SPINAL CANAL: ICD-10-CM

## 2023-01-04 DIAGNOSIS — M47.896 OTHER SPONDYLOSIS, LUMBAR REGION: ICD-10-CM

## 2023-01-04 DIAGNOSIS — G89.29 CHRONIC PAIN OF BOTH KNEES: ICD-10-CM

## 2023-01-04 DIAGNOSIS — M17.12 PRIMARY OSTEOARTHRITIS OF LEFT KNEE: ICD-10-CM

## 2023-01-04 DIAGNOSIS — M25.562 CHRONIC PAIN OF BOTH KNEES: ICD-10-CM

## 2023-01-04 DIAGNOSIS — M19.90 OSTEOARTHRITIS, UNSPECIFIED OSTEOARTHRITIS TYPE, UNSPECIFIED SITE: Primary | ICD-10-CM

## 2023-01-04 PROCEDURE — 99999 PR PBB SHADOW E&M-EST. PATIENT-LVL IV: ICD-10-PCS | Mod: PBBFAC,,, | Performed by: PHYSICIAN ASSISTANT

## 2023-01-04 PROCEDURE — 99999 PR PBB SHADOW E&M-EST. PATIENT-LVL IV: CPT | Mod: PBBFAC,,, | Performed by: PHYSICIAN ASSISTANT

## 2023-01-04 PROCEDURE — 1159F PR MEDICATION LIST DOCUMENTED IN MEDICAL RECORD: ICD-10-PCS | Mod: CPTII,S$GLB,, | Performed by: PHYSICIAN ASSISTANT

## 2023-01-04 PROCEDURE — 1101F PT FALLS ASSESS-DOCD LE1/YR: CPT | Mod: CPTII,S$GLB,, | Performed by: PHYSICIAN ASSISTANT

## 2023-01-04 PROCEDURE — 1159F MED LIST DOCD IN RCRD: CPT | Mod: CPTII,S$GLB,, | Performed by: PHYSICIAN ASSISTANT

## 2023-01-04 PROCEDURE — 1125F AMNT PAIN NOTED PAIN PRSNT: CPT | Mod: CPTII,S$GLB,, | Performed by: PHYSICIAN ASSISTANT

## 2023-01-04 PROCEDURE — 1160F PR REVIEW ALL MEDS BY PRESCRIBER/CLIN PHARMACIST DOCUMENTED: ICD-10-PCS | Mod: CPTII,S$GLB,, | Performed by: PHYSICIAN ASSISTANT

## 2023-01-04 PROCEDURE — 99214 OFFICE O/P EST MOD 30 MIN: CPT | Mod: S$GLB,,, | Performed by: PHYSICIAN ASSISTANT

## 2023-01-04 PROCEDURE — 3288F PR FALLS RISK ASSESSMENT DOCUMENTED: ICD-10-PCS | Mod: CPTII,S$GLB,, | Performed by: PHYSICIAN ASSISTANT

## 2023-01-04 PROCEDURE — 99214 PR OFFICE/OUTPT VISIT, EST, LEVL IV, 30-39 MIN: ICD-10-PCS | Mod: S$GLB,,, | Performed by: PHYSICIAN ASSISTANT

## 2023-01-04 PROCEDURE — 1101F PR PT FALLS ASSESS DOC 0-1 FALLS W/OUT INJ PAST YR: ICD-10-PCS | Mod: CPTII,S$GLB,, | Performed by: PHYSICIAN ASSISTANT

## 2023-01-04 PROCEDURE — 1125F PR PAIN SEVERITY QUANTIFIED, PAIN PRESENT: ICD-10-PCS | Mod: CPTII,S$GLB,, | Performed by: PHYSICIAN ASSISTANT

## 2023-01-04 PROCEDURE — 1160F RVW MEDS BY RX/DR IN RCRD: CPT | Mod: CPTII,S$GLB,, | Performed by: PHYSICIAN ASSISTANT

## 2023-01-04 PROCEDURE — 3288F FALL RISK ASSESSMENT DOCD: CPT | Mod: CPTII,S$GLB,, | Performed by: PHYSICIAN ASSISTANT

## 2023-01-04 RX ORDER — HYDROCODONE BITARTRATE AND ACETAMINOPHEN 5; 325 MG/1; MG/1
1 TABLET ORAL EVERY 8 HOURS PRN
Qty: 90 TABLET | Refills: 0 | Status: SHIPPED | OUTPATIENT
Start: 2023-02-04 | End: 2023-03-05

## 2023-01-04 RX ORDER — HYDROCODONE BITARTRATE AND ACETAMINOPHEN 5; 325 MG/1; MG/1
1 TABLET ORAL EVERY 8 HOURS PRN
Qty: 90 TABLET | Refills: 0 | Status: ON HOLD | OUTPATIENT
Start: 2023-03-05 | End: 2023-03-28 | Stop reason: SDUPTHER

## 2023-01-04 RX ORDER — HYDROCODONE BITARTRATE AND ACETAMINOPHEN 5; 325 MG/1; MG/1
1 TABLET ORAL EVERY 8 HOURS PRN
Qty: 90 TABLET | Refills: 0 | Status: SHIPPED | OUTPATIENT
Start: 2023-01-06 | End: 2023-02-04

## 2023-01-04 NOTE — PROGRESS NOTES
FOLLOW UP NOTE:     CHIEF COMPLAINT: left knee pain, lower back pain      Interval history:  Richard Bustos is a 77 y.o. male with chronic left knee pain who presents today for f/u and medication refill. He is s/p left monovisc in July that provided >90% relief. Pain has returned to baseline. He is s/p bilateral L3,4 , 5 MB RFA  11 months ago with 50% relief.   He takes Norco 5 mg very q.8 hours as needed with moderate benefit.  Denies side effects with medication.  Denies any worsening weakness.  No bowel bladder changes.  Pain today is rated 6/10.    Prior HPI: Richard Bustos is a 77 y.o. male with PMH significant for CAD s/p PCI (ASA and Plavix), atrial fibrllation on coumadin, hx of bilateral hip replacements, CKD, HTN, and DORA presents as an established patient for the continued management of back and right hip pain. Today, the patient is not complaining primarily of back pain. The patient is reporting of right hip pain with associated groin pain today. The patient reports that this began approximately 1 month ago. The patient reports that he has a prior history of a hematoma that he is concerned about given his relatively new onset right hip pain. He was seen by orthopedics, Dr. Ríos and underwent a CT guided drainage.  Hip pain is much improved.  The patient denies of any significant changes in his health since his last appointment. The patient also denies of any changes in the character of his pain since his last appointment. The patient reports of benefit on his current pain regimen. Patient denies of any urinary/fecal incontinence, saddle anesthesia, or weakness.     INTERVENTIONAL PAIN HISTORY:  11/23/21: B/L L3, 4, 5 MB RFA 50% relief  10/19/2020, 5/2021: Left knee intra-articular steroid injection   9/21/2020: Lumbar interlaminar epidural steroid injection at L5-S1  6/22/2020: L5-S1 lumbar interlaminar epidural steroid injection - at least 50% relief  2/17/2020: Left knee intra-articular knee  "injection - good relief  1/10/2020: Radiofrequency ablation of the L3, L4, L5 medial branch nerves on the bilateral-side - 50% relief  8/3/2017: RFA at L3, 4, 5 - reports 80% relief    CURRENT PAIN MEDICATIONS:   Norco 5-325 mg PO BID/TID     Tried in the past:   Tramadol 50 mg PO BID for pain - mild benefit     ROS:  Review of Systems   Constitutional: Negative for chills and fever.   HENT: Negative for tinnitus.    Eyes: Negative for visual disturbance.   Respiratory: Negative for shortness of breath.    Cardiovascular: Negative for chest pain.   Gastrointestinal: Negative for nausea and vomiting.   Genitourinary: Negative for difficulty urinating.   Musculoskeletal: Positive for arthralgias and back pain.   Skin: Negative for rash.   Allergic/Immunologic: Negative for immunocompromised state.   Neurological: Negative for syncope.   Hematological: Does not bruise/bleed easily.   Psychiatric/Behavioral: Negative for suicidal ideas.        MEDICAL, SURGICAL, FAMILY, SOCIAL HX: reviewed    MEDICATIONS/ALLERGIES: reviewed    PHYSICAL EXAM:    VITALS: Vitals reviewed.   Vitals:    01/04/23 0917   Weight: 116.1 kg (256 lb)   Height: 5' 9" (1.753 m)   PainSc:   6   PainLoc: Back       Physical Exam  Vitals and nursing note reviewed.   Constitutional:       Appearance: He is not diaphoretic.   HENT:      Head: Normocephalic and atraumatic.   Eyes:      General:         Right eye: No discharge.         Left eye: No discharge.      Conjunctiva/sclera: Conjunctivae normal.   Cardiovascular:      Rate and Rhythm: RRR  Pulmonary:      Effort: Pulmonary effort is normal. No respiratory distress.      Breath sounds: Normal breath sounds.   Abdominal:      Palpations: Abdomen is soft.   Musculoskeletal:      Lumbar back: Tenderness present. Negative right straight leg raise test and negative left straight leg raise test.   Skin:     General: Skin is warm and dry.      Findings: No rash.   Neurological:      Mental Status: He is " alert and oriented to person, place, and time.   Psychiatric:         Mood and Affect: Mood and affect normal.         Cognition and Memory: Memory normal.         Judgment: Judgment normal.          UPPER EXTREMITIES: Normal alignment, normal range of motion, no atrophy, no skin changes,  hair growth and nail growth normal and equal bilaterally. No swelling, no tenderness.    LOWER EXTREMITIES:  midline joint tenderness bilateral patella. + crepitus bilaterally. No laxity noted   LUMBAR SPINE  Lumbar spine: ROM is significantly limited with flexion extension and oblique extension with increased pain.    Supine straight leg raise: unable to perform secondary to fall risk   ((+)) Facet loading bilaterally  Internal and external rotation of the hips: unable to perform secondary to fall risk  Myofascial exam: No tenderness to palpation across lumbar paraspinous muscles.     MOTOR: Tone and bulk: normal bilateral upper and lower Strength: normal   Delt      Bi         Tri        WE      WF                        R          5          5          5          5          5          5            L          5          5          5          5          5          5               IP         ADD     ABD     Quad   TA        Gas      HAM  R          5          5          5          5          5          5          5  L          5          5          5          5          5          5          5     SENSATION: Light touch and pinprick intact bilaterally  REFLEXES: normal, symmetric, nonbrisk.  Toes down, no clonus. Negative roca's sign bilaterally.  GAIT: normal rise, base, steps, and arm swing.      IMAGING: no new imaging to review    ASSESSMENT: Richard Bustos is a 75 y.o. male with PMH significant for CAD s/p PCI (ASA and Plavix), atrial fibrllation on coumadin, hx of bilateral hip replacements, CKD, HTN, and DORA presents as an established patient for the continued management of back knee and right hip pain. No recent  imaging of the right hip to review today. Treatment plan outlined below.   1. Osteoarthritis, unspecified osteoarthritis type, unspecified site    2. Other spondylosis, lumbar region    3. Primary osteoarthritis of left knee    4. Chronic pain of both knees    5. Central stenosis of spinal canal        PLAN:  1. I have stressed the importance of physical activity and exercise to improve overall health  2. Reviewed pertinent imaging and records with patient  3  Patient with significant benefit following Lumbar STARLA.  Patient will continue to monitor his progress.  May consider repeat procedure in future if pain returns or worsens.   4. Monitor progress from lumbar MB RFA at L3, 4, 5   5. Schedule repeat left knee monovisc today.    6. He can continue Norco 5mg q8hrs as needed. Scripts given last visit   reviewed.  Previous UDS consistent.   7. Follow up 3 months.

## 2023-01-15 PROBLEM — L97.522 ULCER OF TOE, LEFT, WITH FAT LAYER EXPOSED: Status: ACTIVE | Noted: 2023-01-15

## 2023-01-17 ENCOUNTER — PATIENT MESSAGE (OUTPATIENT)
Dept: ADMINISTRATIVE | Facility: HOSPITAL | Age: 78
End: 2023-01-17
Payer: MEDICARE

## 2023-01-25 DIAGNOSIS — I48.0 PAROXYSMAL ATRIAL FIBRILLATION: ICD-10-CM

## 2023-01-25 DIAGNOSIS — Z15.89 MTHFR MUTATION (METHYLENETETRAHYDROFOLATE REDUCTASE): ICD-10-CM

## 2023-01-25 DIAGNOSIS — I50.22 CHRONIC SYSTOLIC CONGESTIVE HEART FAILURE: ICD-10-CM

## 2023-01-25 DIAGNOSIS — N18.32 STAGE 3B CHRONIC KIDNEY DISEASE: ICD-10-CM

## 2023-01-25 DIAGNOSIS — I25.10 CORONARY ARTERY DISEASE INVOLVING NATIVE CORONARY ARTERY OF NATIVE HEART WITHOUT ANGINA PECTORIS: Chronic | ICD-10-CM

## 2023-01-25 DIAGNOSIS — I10 ESSENTIAL HYPERTENSION: ICD-10-CM

## 2023-01-25 DIAGNOSIS — D50.9 IRON DEFICIENCY ANEMIA: ICD-10-CM

## 2023-01-25 DIAGNOSIS — E78.2 MIXED HYPERLIPIDEMIA: ICD-10-CM

## 2023-01-25 DIAGNOSIS — I11.9 HYPERTENSIVE LEFT VENTRICULAR HYPERTROPHY, WITHOUT HEART FAILURE: ICD-10-CM

## 2023-01-25 DIAGNOSIS — M10.9 ACUTE GOUT OF FOOT, UNSPECIFIED CAUSE, UNSPECIFIED LATERALITY: ICD-10-CM

## 2023-01-25 DIAGNOSIS — I51.9 LV DYSFUNCTION: ICD-10-CM

## 2023-01-25 NOTE — TELEPHONE ENCOUNTER
Care Due:                  Date            Visit Type   Department     Provider  --------------------------------------------------------------------------------    Last Visit: None Found      None         None Found  Next Visit: None Scheduled  None         None Found                                                            Last  Test          Frequency    Reason                     Performed    Due Date  --------------------------------------------------------------------------------    Office Visit  12 months..  allopurinoL,               Not Found    Overdue                             atorvastatin, carvediloL,                             clopidogreL, famotidine,                             lisinopriL, omeprazole,                             traZODone................    Lipid Panel.  12 months..  atorvastatin.............  03-   03-    [facility] Embedded Care Gaps. Reference number: [id number]. 1/25/2023   3:57:29 PM CST

## 2023-01-25 NOTE — TELEPHONE ENCOUNTER
----- Message from Alisia Mendez sent at 2023  1:24 PM CST -----  Contact: pharm  Type:  RX Refill Request    Who Called:  pharmacy calling  Refill or New Rx:  refill  RX Name and Strength:      1. famotidine (PEPCID) 40 MG tablet 90 tablet 3 2021   Sig - Route: Take 1 tablet (40 mg total) by mouth once daily. - Oral    2. atorvastatin (LIPITOR) 80 MG tablet 90 tablet    Sig - Route: Take 1 tablet (80 mg total) by mouth once daily. - Oral    3. clopidogreL (PLAVIX) 75 mg tablet 90 tablet   Sig - Route: Take 1 tablet (75 mg total) by mouth once daily. - Oral    4. ferrous sulfate (FEROSUL) 325 mg (65 mg iron) Tab tablet 180 tablet   Sig: TAKE 1 TABLET BY MOUTH TWICE DAILY    5. allopurinoL (ZYLOPRIM) 100 MG tablet 90 tablet  Sig - Route: Take 1 tablet (100 mg total) by mouth every evening. - Oral    6. lisinopriL (PRINIVIL,ZESTRIL) 40 MG tablet 90 tablet 3   Sig - Route: Take 1 tablet (40 mg total) by mouth every evening. - Oral    7. magnesium oxide (MAG-OX) 400 mg (241.3 mg magnesium) tablet 90 tablet 3   Sig - Route: Take 1 tablet (400 mg total) by mouth once daily. - Oral    8. traZODone (DESYREL) 50 MG tablet 90 tablet 3 2021  Sig - Route: Take 1 tablet (50 mg total) by mouth every evening. - Oral      9.carvediloL (COREG) 25 MG tablet 180 tablet 3 3  Sig - Route: Take 1 tablet (25 mg total) by mouth 2 (two) times daily with meals. - Oral    10. warfarin (COUMADIN) 5 MG tablet 90 tablet 3    Si mg except on wednesday and saturday Wed and sat 2.5 mgs      How is the patient currently taking it? (ex. ):  as order  Is this a 30 day or 90 day RX:  as order  Preferred Pharmacy with phone number:    [facility] - [place] - [address] [place] [address]  Phone: [phone] Fax: [phone]      Local or Mail Order:  local  Ordering Provider:  lavelle  Best Call Back Number:  [phone]  Additional Information:

## 2023-01-26 RX ORDER — FERROUS SULFATE 325(65) MG
TABLET ORAL
Qty: 180 TABLET | Refills: 3 | Status: ON HOLD | OUTPATIENT
Start: 2023-01-26 | End: 2023-07-21 | Stop reason: HOSPADM

## 2023-01-26 RX ORDER — TRAZODONE HYDROCHLORIDE 50 MG/1
50 TABLET ORAL NIGHTLY
Qty: 90 TABLET | Refills: 3 | Status: ON HOLD | OUTPATIENT
Start: 2023-01-26 | End: 2023-04-01

## 2023-01-26 RX ORDER — WARFARIN SODIUM 5 MG/1
TABLET ORAL
Qty: 90 TABLET | Refills: 3 | Status: ON HOLD | OUTPATIENT
Start: 2023-01-26 | End: 2023-04-03 | Stop reason: SDUPTHER

## 2023-01-26 RX ORDER — CARVEDILOL 25 MG/1
25 TABLET ORAL 2 TIMES DAILY WITH MEALS
Qty: 180 TABLET | Refills: 3 | Status: ON HOLD | OUTPATIENT
Start: 2023-01-26 | End: 2023-04-03 | Stop reason: HOSPADM

## 2023-01-26 RX ORDER — LISINOPRIL 40 MG/1
40 TABLET ORAL NIGHTLY
Qty: 90 TABLET | Refills: 3 | Status: ON HOLD | OUTPATIENT
Start: 2023-01-26 | End: 2023-04-03 | Stop reason: SDUPTHER

## 2023-01-26 RX ORDER — FAMOTIDINE 40 MG/1
40 TABLET, FILM COATED ORAL DAILY
Qty: 90 TABLET | Refills: 3 | Status: ON HOLD | OUTPATIENT
Start: 2023-01-26 | End: 2023-07-21 | Stop reason: HOSPADM

## 2023-01-26 RX ORDER — ATORVASTATIN CALCIUM 80 MG/1
80 TABLET, FILM COATED ORAL DAILY
Qty: 90 TABLET | Refills: 3 | Status: ON HOLD | OUTPATIENT
Start: 2023-01-26 | End: 2023-07-21 | Stop reason: HOSPADM

## 2023-01-26 RX ORDER — CLOPIDOGREL BISULFATE 75 MG/1
75 TABLET ORAL DAILY
Qty: 90 TABLET | Refills: 3 | Status: ON HOLD | OUTPATIENT
Start: 2023-01-26 | End: 2023-04-28 | Stop reason: HOSPADM

## 2023-01-26 RX ORDER — LANOLIN ALCOHOL/MO/W.PET/CERES
1 CREAM (GRAM) TOPICAL DAILY
Qty: 90 TABLET | Refills: 3 | Status: ON HOLD | OUTPATIENT
Start: 2023-01-26 | End: 2023-04-28 | Stop reason: HOSPADM

## 2023-01-26 RX ORDER — ALLOPURINOL 100 MG/1
100 TABLET ORAL NIGHTLY
Qty: 90 TABLET | Refills: 3 | Status: SHIPPED | OUTPATIENT
Start: 2023-01-26

## 2023-02-11 NOTE — TELEPHONE ENCOUNTER
History and physical documented and up to date, allergies reviewed, lab results reviewed, pre-procedure education provided, patient verbalized understanding of procedure, procedural consent signed and patient is NPO. Yes, I can sign it

## 2023-02-15 ENCOUNTER — OFFICE VISIT (OUTPATIENT)
Dept: PODIATRY | Facility: CLINIC | Age: 78
End: 2023-02-15
Payer: MEDICAID

## 2023-02-15 VITALS
HEART RATE: 56 BPM | WEIGHT: 256 LBS | HEIGHT: 69 IN | SYSTOLIC BLOOD PRESSURE: 113 MMHG | DIASTOLIC BLOOD PRESSURE: 55 MMHG | BODY MASS INDEX: 37.92 KG/M2

## 2023-02-15 DIAGNOSIS — Z79.01 LONG TERM (CURRENT) USE OF ANTICOAGULANTS: ICD-10-CM

## 2023-02-15 DIAGNOSIS — Z87.898 HISTORY OF ULCERATION: Primary | ICD-10-CM

## 2023-02-15 DIAGNOSIS — I73.9 PVD (PERIPHERAL VASCULAR DISEASE): ICD-10-CM

## 2023-02-15 DIAGNOSIS — B35.1 ONYCHOMYCOSIS DUE TO DERMATOPHYTE: ICD-10-CM

## 2023-02-15 DIAGNOSIS — E11.49 TYPE II DIABETES MELLITUS WITH NEUROLOGICAL MANIFESTATIONS: ICD-10-CM

## 2023-02-15 PROCEDURE — 11721 DEBRIDE NAIL 6 OR MORE: CPT | Mod: Q9,S$GLB,, | Performed by: PODIATRIST

## 2023-02-15 PROCEDURE — 1159F PR MEDICATION LIST DOCUMENTED IN MEDICAL RECORD: ICD-10-PCS | Mod: CPTII,S$GLB,, | Performed by: PODIATRIST

## 2023-02-15 PROCEDURE — 99999 PR PBB SHADOW E&M-EST. PATIENT-LVL V: ICD-10-PCS | Mod: PBBFAC,,, | Performed by: PODIATRIST

## 2023-02-15 PROCEDURE — 11721 PR DEBRIDEMENT OF NAILS, 6 OR MORE: ICD-10-PCS | Mod: Q9,S$GLB,, | Performed by: PODIATRIST

## 2023-02-15 PROCEDURE — 11721 DEBRIDE NAIL 6 OR MORE: CPT | Mod: PBBFAC,PN | Performed by: PODIATRIST

## 2023-02-15 PROCEDURE — 1101F PT FALLS ASSESS-DOCD LE1/YR: CPT | Mod: CPTII,S$GLB,, | Performed by: PODIATRIST

## 2023-02-15 PROCEDURE — 99215 OFFICE O/P EST HI 40 MIN: CPT | Mod: 25,PBBFAC,PN | Performed by: PODIATRIST

## 2023-02-15 PROCEDURE — 1126F PR PAIN SEVERITY QUANTIFIED, NO PAIN PRESENT: ICD-10-PCS | Mod: CPTII,S$GLB,, | Performed by: PODIATRIST

## 2023-02-15 PROCEDURE — 1160F PR REVIEW ALL MEDS BY PRESCRIBER/CLIN PHARMACIST DOCUMENTED: ICD-10-PCS | Mod: CPTII,S$GLB,, | Performed by: PODIATRIST

## 2023-02-15 PROCEDURE — 3078F PR MOST RECENT DIASTOLIC BLOOD PRESSURE < 80 MM HG: ICD-10-PCS | Mod: CPTII,S$GLB,, | Performed by: PODIATRIST

## 2023-02-15 PROCEDURE — 3074F SYST BP LT 130 MM HG: CPT | Mod: CPTII,S$GLB,, | Performed by: PODIATRIST

## 2023-02-15 PROCEDURE — 99214 OFFICE O/P EST MOD 30 MIN: CPT | Mod: 25,S$GLB,, | Performed by: PODIATRIST

## 2023-02-15 PROCEDURE — 99214 PR OFFICE/OUTPT VISIT, EST, LEVL IV, 30-39 MIN: ICD-10-PCS | Mod: 25,S$GLB,, | Performed by: PODIATRIST

## 2023-02-15 PROCEDURE — 3288F PR FALLS RISK ASSESSMENT DOCUMENTED: ICD-10-PCS | Mod: CPTII,S$GLB,, | Performed by: PODIATRIST

## 2023-02-15 PROCEDURE — 99999 PR PBB SHADOW E&M-EST. PATIENT-LVL V: CPT | Mod: PBBFAC,,, | Performed by: PODIATRIST

## 2023-02-15 PROCEDURE — 1126F AMNT PAIN NOTED NONE PRSNT: CPT | Mod: CPTII,S$GLB,, | Performed by: PODIATRIST

## 2023-02-15 PROCEDURE — 3288F FALL RISK ASSESSMENT DOCD: CPT | Mod: CPTII,S$GLB,, | Performed by: PODIATRIST

## 2023-02-15 PROCEDURE — 3074F PR MOST RECENT SYSTOLIC BLOOD PRESSURE < 130 MM HG: ICD-10-PCS | Mod: CPTII,S$GLB,, | Performed by: PODIATRIST

## 2023-02-15 PROCEDURE — 1101F PR PT FALLS ASSESS DOC 0-1 FALLS W/OUT INJ PAST YR: ICD-10-PCS | Mod: CPTII,S$GLB,, | Performed by: PODIATRIST

## 2023-02-15 PROCEDURE — 3078F DIAST BP <80 MM HG: CPT | Mod: CPTII,S$GLB,, | Performed by: PODIATRIST

## 2023-02-15 PROCEDURE — 1159F MED LIST DOCD IN RCRD: CPT | Mod: CPTII,S$GLB,, | Performed by: PODIATRIST

## 2023-02-15 PROCEDURE — 1160F RVW MEDS BY RX/DR IN RCRD: CPT | Mod: CPTII,S$GLB,, | Performed by: PODIATRIST

## 2023-02-15 RX ORDER — CICLOPIROX 80 MG/ML
SOLUTION TOPICAL NIGHTLY
Qty: 6.6 ML | Refills: 11 | Status: SHIPPED | OUTPATIENT
Start: 2023-02-15 | End: 2023-03-17 | Stop reason: CLARIF

## 2023-02-15 NOTE — PROGRESS NOTES
Subjective:      Patient ID: Richard Bustos is a 77 y.o. male.    Chief Complaint: Toe Pain    Diabetes, increased risk amputation needing evaluation/management/optomization of foot care.    Cc2 thick long discolored toenails all 10 toes.  Gradual onset, worsening over the past few weeks.  Aggravated by socks shoes pressure ambulation.  Periodic debridement help symptoms.        Chief Complaint   Patient presents with    Toe Pain       DM shoes, custom inserts    Review of Systems   Constitutional: Negative for chills, diaphoresis, fever, malaise/fatigue and night sweats.   Cardiovascular:  Positive for leg swelling. Negative for claudication, cyanosis and syncope.   Skin:  Positive for nail changes. Negative for color change, dry skin, rash, suspicious lesions and unusual hair distribution.   Musculoskeletal:  Negative for falls, joint pain, joint swelling, muscle cramps, muscle weakness and stiffness.   Gastrointestinal:  Negative for constipation, diarrhea, nausea and vomiting.   Neurological:  Positive for numbness, paresthesias and sensory change. Negative for brief paralysis, disturbances in coordination, focal weakness and tremors.         Objective:      Physical Exam  Constitutional:       Appearance: He is well-developed. He is not diaphoretic.      Comments: Oriented to time, place, and person.   Cardiovascular:      Pulses:           Dorsalis pedis pulses are 1+ on the right side and 1+ on the left side.        Posterior tibial pulses are 1+ on the right side and 1+ on the left side.      Comments: Capillary fill time 3-5 seconds.  All toes warm to touch.      Trace lower extremity edema bilateral.    Negative elevational pallor and dependent rubor bilateral.    Musculoskeletal:      Comments: Hallux valgus and hammertoes both feet without symptom today.    All ten toes without clubbing, cyanosis, or signs of ischemia.      No pain to palpation bilateral lower extremities.      Range of motion,  stability, muscle strength, and muscle tone are age and health appropriate normal bilateral feet and legs.       Skin:     General: Skin is warm and dry.      Coloration: Skin is not pale.      Findings: No abrasion, bruising, burn, ecchymosis, erythema, laceration, lesion, petechiae or rash.      Nails: There is no clubbing.      Comments: Skin thin, atrophic, with decreased density and distribution of pedal hair bilateral, but without hyperpigmentation, minerva discoloration,  ulcers, masses, nodules or cords palpated bilateral feet and legs.    Toenails 1st, 2nd, 3rd, 4th, 5th  bilateral are hypertrophic thickened 2-3 mm, dystrophic, discolored tanish brown with tan, gray crumbly subungual debris.  Tender to distal nail plate pressure, without periungual skin abnormality of each.    Toenails 1st, 2nd, 3rd, 4th, 5th  bilateral are hypertrophic thickened 2-3 mm, dystrophic, discolored tanish brown with tan, gray crumbly subungual debris.  Tender to distal nail plate pressure, without periungual skin abnormality of each.     Neurological:      Mental Status: He is alert and oriented to person, place, and time. He is not disoriented.      Sensory: Sensory deficit present.      Motor: No tremor, atrophy or abnormal muscle tone.      Deep Tendon Reflexes:      Reflex Scores:       Patellar reflexes are 2+ on the right side and 2+ on the left side.       Achilles reflexes are 2+ on the right side and 2+ on the left side.     Comments: Decreased/absent vibratory sensation bilateral feet to 128Hz tuning fork.    Diminished/loss of protective sensation all toes bilateral to 10 gram monofilament.     Psychiatric:         Behavior: Behavior is cooperative.           Assessment:       Encounter Diagnoses   Name Primary?    History of ulceration Yes    Long term (current) use of anticoagulants     PVD (peripheral vascular disease)     Type II diabetes mellitus with neurological manifestations     Onychomycosis due to  dermatophyte          Plan:       Richard was seen today for toe pain.    Diagnoses and all orders for this visit:    History of ulceration  -     DIABETIC SHOES FOR HOME USE  -      DIABETES FOOT EXAM    Long term (current) use of anticoagulants  -     DIABETIC SHOES FOR HOME USE  -      DIABETES FOOT EXAM    PVD (peripheral vascular disease)  -     DIABETIC SHOES FOR HOME USE  -      DIABETES FOOT EXAM    Type II diabetes mellitus with neurological manifestations  -     DIABETIC SHOES FOR HOME USE  -      DIABETES FOOT EXAM    Onychomycosis due to dermatophyte  -     DIABETIC SHOES FOR HOME USE  -      DIABETES FOOT EXAM    Other orders  -     ciclopirox (PENLAC) 8 % Soln; Apply topically nightly.      I counseled the patient on his conditions, their implications and medical management.        The patient has received literature on basic diabetic foot care.  Patient will inspect feet daily, wear protective shoe gear when ambulatory, and apply moisturizer to skin as needed to maintain elasticity and help prevent ulceration.    With the patient's permission, I debrided all ten toenails with a sterile nipper and curette, removing all offending nail and debris.  Patient tolerated the procedure well and related significant relief.    Rx DM shoes, custom inserts    Inspect feet multiple times daily for signs of occurrence/recurrence ulceration.              Follow up in about 1 year (around 2/15/2024).

## 2023-02-22 ENCOUNTER — PATIENT MESSAGE (OUTPATIENT)
Dept: FAMILY MEDICINE | Facility: CLINIC | Age: 78
End: 2023-02-22
Payer: MEDICARE

## 2023-03-13 NOTE — PROGRESS NOTES
Spoke with pts daughter  gave dosing and next inr check date pts daughter  voiced understanding.      199.9

## 2023-03-19 ENCOUNTER — PATIENT MESSAGE (OUTPATIENT)
Dept: FAMILY MEDICINE | Facility: CLINIC | Age: 78
End: 2023-03-19
Payer: MEDICARE

## 2023-03-20 ENCOUNTER — PATIENT MESSAGE (OUTPATIENT)
Dept: FAMILY MEDICINE | Facility: CLINIC | Age: 78
End: 2023-03-20
Payer: MEDICARE

## 2023-03-20 ENCOUNTER — HOSPITAL ENCOUNTER (INPATIENT)
Facility: HOSPITAL | Age: 78
LOS: 6 days | Discharge: NURSING, REHAB OR LONG-TERM CARE | DRG: 028 | End: 2023-03-28
Attending: EMERGENCY MEDICINE | Admitting: NEUROLOGICAL SURGERY
Payer: MEDICARE

## 2023-03-20 DIAGNOSIS — I49.9 IRREGULAR HEART RHYTHM: ICD-10-CM

## 2023-03-20 DIAGNOSIS — I25.10 CAD (CORONARY ARTERY DISEASE): ICD-10-CM

## 2023-03-20 DIAGNOSIS — G89.4 CHRONIC PAIN DISORDER: ICD-10-CM

## 2023-03-20 DIAGNOSIS — S12.501A CLOSED NONDISPLACED FRACTURE OF SIXTH CERVICAL VERTEBRA, UNSPECIFIED FRACTURE MORPHOLOGY, INITIAL ENCOUNTER: ICD-10-CM

## 2023-03-20 DIAGNOSIS — Z01.818 PRE-OPERATIVE CLEARANCE: ICD-10-CM

## 2023-03-20 DIAGNOSIS — M48.02 CERVICAL STENOSIS OF SPINE: ICD-10-CM

## 2023-03-20 DIAGNOSIS — J18.9 PNEUMONIA: ICD-10-CM

## 2023-03-20 DIAGNOSIS — I25.10 CORONARY ARTERY DISEASE INVOLVING NATIVE CORONARY ARTERY OF NATIVE HEART WITHOUT ANGINA PECTORIS: ICD-10-CM

## 2023-03-20 DIAGNOSIS — G95.20 SPINAL CORD COMPRESSION: ICD-10-CM

## 2023-03-20 DIAGNOSIS — I48.0 PAROXYSMAL ATRIAL FIBRILLATION: ICD-10-CM

## 2023-03-20 DIAGNOSIS — R53.1 GENERALIZED WEAKNESS: ICD-10-CM

## 2023-03-20 DIAGNOSIS — R07.9 CHEST PAIN: ICD-10-CM

## 2023-03-20 DIAGNOSIS — Z13.6 ENCOUNTER FOR SCREENING FOR CARDIOVASCULAR DISORDERS: ICD-10-CM

## 2023-03-20 DIAGNOSIS — R26.2 DIFFICULTY WALKING: Primary | ICD-10-CM

## 2023-03-20 LAB
ALBUMIN SERPL BCP-MCNC: 2.4 G/DL (ref 3.5–5.2)
ALP SERPL-CCNC: 91 U/L (ref 55–135)
ALT SERPL W/O P-5'-P-CCNC: 20 U/L (ref 10–44)
ANION GAP SERPL CALC-SCNC: 5 MMOL/L (ref 8–16)
AST SERPL-CCNC: 46 U/L (ref 10–40)
BACTERIA #/AREA URNS HPF: NORMAL /HPF
BASOPHILS # BLD AUTO: 0.01 K/UL (ref 0–0.2)
BASOPHILS NFR BLD: 0.2 % (ref 0–1.9)
BILIRUB SERPL-MCNC: 0.5 MG/DL (ref 0.1–1)
BILIRUB UR QL STRIP: NEGATIVE
BUN SERPL-MCNC: 32 MG/DL (ref 8–23)
CALCIUM SERPL-MCNC: 8.7 MG/DL (ref 8.7–10.5)
CHLORIDE SERPL-SCNC: 113 MMOL/L (ref 95–110)
CK SERPL-CCNC: 213 U/L (ref 20–200)
CLARITY UR: CLEAR
CO2 SERPL-SCNC: 20 MMOL/L (ref 23–29)
COLOR UR: YELLOW
CREAT SERPL-MCNC: 1.4 MG/DL (ref 0.5–1.4)
DIFFERENTIAL METHOD: ABNORMAL
EOSINOPHIL # BLD AUTO: 0.1 K/UL (ref 0–0.5)
EOSINOPHIL NFR BLD: 1.5 % (ref 0–8)
ERYTHROCYTE [DISTWIDTH] IN BLOOD BY AUTOMATED COUNT: 15.1 % (ref 11.5–14.5)
EST. GFR  (NO RACE VARIABLE): 52 ML/MIN/1.73 M^2
GLUCOSE SERPL-MCNC: 171 MG/DL (ref 70–110)
GLUCOSE UR QL STRIP: NEGATIVE
HCT VFR BLD AUTO: 27.4 % (ref 40–54)
HGB BLD-MCNC: 9.2 G/DL (ref 14–18)
HGB UR QL STRIP: NEGATIVE
HYALINE CASTS #/AREA URNS LPF: 1 /LPF
IMM GRANULOCYTES # BLD AUTO: 0.04 K/UL (ref 0–0.04)
IMM GRANULOCYTES NFR BLD AUTO: 0.6 % (ref 0–0.5)
INR PPP: 2.7 (ref 0.8–1.2)
KETONES UR QL STRIP: NEGATIVE
LEUKOCYTE ESTERASE UR QL STRIP: NEGATIVE
LYMPHOCYTES # BLD AUTO: 1.1 K/UL (ref 1–4.8)
LYMPHOCYTES NFR BLD: 16.3 % (ref 18–48)
MCH RBC QN AUTO: 33.5 PG (ref 27–31)
MCHC RBC AUTO-ENTMCNC: 33.6 G/DL (ref 32–36)
MCV RBC AUTO: 100 FL (ref 82–98)
MICROSCOPIC COMMENT: NORMAL
MONOCYTES # BLD AUTO: 0.5 K/UL (ref 0.3–1)
MONOCYTES NFR BLD: 7.5 % (ref 4–15)
NEUTROPHILS # BLD AUTO: 4.8 K/UL (ref 1.8–7.7)
NEUTROPHILS NFR BLD: 73.9 % (ref 38–73)
NITRITE UR QL STRIP: NEGATIVE
NRBC BLD-RTO: 0 /100 WBC
PH UR STRIP: 5 [PH] (ref 5–8)
PLATELET # BLD AUTO: 191 K/UL (ref 150–450)
PMV BLD AUTO: 11 FL (ref 9.2–12.9)
POTASSIUM SERPL-SCNC: 4.6 MMOL/L (ref 3.5–5.1)
PROCALCITONIN SERPL IA-MCNC: 0.1 NG/ML
PROT SERPL-MCNC: 5.6 G/DL (ref 6–8.4)
PROT UR QL STRIP: ABNORMAL
PROTHROMBIN TIME: 27.7 SEC (ref 9–12.5)
RBC # BLD AUTO: 2.75 M/UL (ref 4.6–6.2)
RBC #/AREA URNS HPF: 0 /HPF (ref 0–4)
SODIUM SERPL-SCNC: 138 MMOL/L (ref 136–145)
SP GR UR STRIP: 1.01 (ref 1–1.03)
URN SPEC COLLECT METH UR: ABNORMAL
UROBILINOGEN UR STRIP-ACNC: NEGATIVE EU/DL
WBC # BLD AUTO: 6.51 K/UL (ref 3.9–12.7)
WBC #/AREA URNS HPF: 0 /HPF (ref 0–5)

## 2023-03-20 PROCEDURE — 25000003 PHARM REV CODE 250: Performed by: NURSE PRACTITIONER

## 2023-03-20 PROCEDURE — 80053 COMPREHEN METABOLIC PANEL: CPT | Performed by: EMERGENCY MEDICINE

## 2023-03-20 PROCEDURE — 93010 ELECTROCARDIOGRAM REPORT: CPT | Mod: ,,, | Performed by: SPECIALIST

## 2023-03-20 PROCEDURE — 36415 COLL VENOUS BLD VENIPUNCTURE: CPT | Performed by: NURSE PRACTITIONER

## 2023-03-20 PROCEDURE — 36415 COLL VENOUS BLD VENIPUNCTURE: CPT | Performed by: EMERGENCY MEDICINE

## 2023-03-20 PROCEDURE — 63600175 PHARM REV CODE 636 W HCPCS: Performed by: NURSE PRACTITIONER

## 2023-03-20 PROCEDURE — 99285 EMERGENCY DEPT VISIT HI MDM: CPT | Mod: 25

## 2023-03-20 PROCEDURE — 93005 ELECTROCARDIOGRAM TRACING: CPT

## 2023-03-20 PROCEDURE — 82550 ASSAY OF CK (CPK): CPT | Performed by: NURSE PRACTITIONER

## 2023-03-20 PROCEDURE — 81000 URINALYSIS NONAUTO W/SCOPE: CPT | Performed by: EMERGENCY MEDICINE

## 2023-03-20 PROCEDURE — 94760 N-INVAS EAR/PLS OXIMETRY 1: CPT

## 2023-03-20 PROCEDURE — 85610 PROTHROMBIN TIME: CPT | Performed by: EMERGENCY MEDICINE

## 2023-03-20 PROCEDURE — 84145 PROCALCITONIN (PCT): CPT | Performed by: NURSE PRACTITIONER

## 2023-03-20 PROCEDURE — 85025 COMPLETE CBC W/AUTO DIFF WBC: CPT | Performed by: EMERGENCY MEDICINE

## 2023-03-20 PROCEDURE — G0378 HOSPITAL OBSERVATION PER HR: HCPCS

## 2023-03-20 PROCEDURE — 93010 EKG 12-LEAD: ICD-10-PCS | Mod: ,,, | Performed by: SPECIALIST

## 2023-03-20 RX ORDER — DEXTROSE 40 %
15 GEL (GRAM) ORAL
Status: DISCONTINUED | OUTPATIENT
Start: 2023-03-20 | End: 2023-03-28 | Stop reason: HOSPADM

## 2023-03-20 RX ORDER — DEXTROSE 40 %
30 GEL (GRAM) ORAL
Status: DISCONTINUED | OUTPATIENT
Start: 2023-03-20 | End: 2023-03-28 | Stop reason: HOSPADM

## 2023-03-20 RX ORDER — ONDANSETRON 2 MG/ML
4 INJECTION INTRAMUSCULAR; INTRAVENOUS EVERY 8 HOURS PRN
Status: DISCONTINUED | OUTPATIENT
Start: 2023-03-20 | End: 2023-03-28 | Stop reason: HOSPADM

## 2023-03-20 RX ORDER — SIMETHICONE 80 MG
1 TABLET,CHEWABLE ORAL 4 TIMES DAILY PRN
Status: DISCONTINUED | OUTPATIENT
Start: 2023-03-20 | End: 2023-03-28 | Stop reason: HOSPADM

## 2023-03-20 RX ORDER — SODIUM CHLORIDE 9 MG/ML
INJECTION, SOLUTION INTRAVENOUS CONTINUOUS
Status: DISCONTINUED | OUTPATIENT
Start: 2023-03-20 | End: 2023-03-20

## 2023-03-20 RX ORDER — WARFARIN 2.5 MG/1
2.5 TABLET ORAL DAILY
Status: DISCONTINUED | OUTPATIENT
Start: 2023-03-21 | End: 2023-03-22

## 2023-03-20 RX ORDER — FAMOTIDINE 20 MG/1
40 TABLET, FILM COATED ORAL DAILY
Status: DISCONTINUED | OUTPATIENT
Start: 2023-03-21 | End: 2023-03-28 | Stop reason: HOSPADM

## 2023-03-20 RX ORDER — ASPIRIN 81 MG/1
81 TABLET ORAL DAILY
Status: DISCONTINUED | OUTPATIENT
Start: 2023-03-21 | End: 2023-03-22

## 2023-03-20 RX ORDER — MAG HYDROX/ALUMINUM HYD/SIMETH 200-200-20
30 SUSPENSION, ORAL (FINAL DOSE FORM) ORAL 4 TIMES DAILY PRN
Status: DISCONTINUED | OUTPATIENT
Start: 2023-03-20 | End: 2023-03-28 | Stop reason: HOSPADM

## 2023-03-20 RX ORDER — CALCITRIOL 0.25 UG/1
0.75 CAPSULE ORAL DAILY
Status: DISCONTINUED | OUTPATIENT
Start: 2023-03-21 | End: 2023-03-28 | Stop reason: HOSPADM

## 2023-03-20 RX ORDER — GLUCAGON 1 MG
1 KIT INJECTION
Status: DISCONTINUED | OUTPATIENT
Start: 2023-03-20 | End: 2023-03-28 | Stop reason: HOSPADM

## 2023-03-20 RX ORDER — AMOXICILLIN 250 MG
1 CAPSULE ORAL 2 TIMES DAILY
Status: DISCONTINUED | OUTPATIENT
Start: 2023-03-20 | End: 2023-03-28

## 2023-03-20 RX ORDER — CLOPIDOGREL BISULFATE 75 MG/1
75 TABLET ORAL DAILY
Status: DISCONTINUED | OUTPATIENT
Start: 2023-03-21 | End: 2023-03-22

## 2023-03-20 RX ORDER — IPRATROPIUM BROMIDE AND ALBUTEROL SULFATE 2.5; .5 MG/3ML; MG/3ML
3 SOLUTION RESPIRATORY (INHALATION) EVERY 4 HOURS PRN
Status: DISCONTINUED | OUTPATIENT
Start: 2023-03-20 | End: 2023-03-23

## 2023-03-20 RX ORDER — HYDROCODONE BITARTRATE AND ACETAMINOPHEN 5; 325 MG/1; MG/1
1 TABLET ORAL EVERY 6 HOURS PRN
Status: DISCONTINUED | OUTPATIENT
Start: 2023-03-20 | End: 2023-03-28 | Stop reason: HOSPADM

## 2023-03-20 RX ORDER — SODIUM CHLORIDE 0.9 % (FLUSH) 0.9 %
10 SYRINGE (ML) INJECTION EVERY 12 HOURS PRN
Status: DISCONTINUED | OUTPATIENT
Start: 2023-03-20 | End: 2023-03-28 | Stop reason: HOSPADM

## 2023-03-20 RX ORDER — TALC
6 POWDER (GRAM) TOPICAL NIGHTLY PRN
Status: DISCONTINUED | OUTPATIENT
Start: 2023-03-20 | End: 2023-03-28 | Stop reason: HOSPADM

## 2023-03-20 RX ORDER — LANOLIN ALCOHOL/MO/W.PET/CERES
1 CREAM (GRAM) TOPICAL 2 TIMES DAILY
Status: DISCONTINUED | OUTPATIENT
Start: 2023-03-20 | End: 2023-03-28 | Stop reason: HOSPADM

## 2023-03-20 RX ORDER — TRAZODONE HYDROCHLORIDE 50 MG/1
50 TABLET ORAL NIGHTLY
Status: DISCONTINUED | OUTPATIENT
Start: 2023-03-20 | End: 2023-03-28 | Stop reason: HOSPADM

## 2023-03-20 RX ORDER — ALLOPURINOL 100 MG/1
100 TABLET ORAL NIGHTLY
Status: DISCONTINUED | OUTPATIENT
Start: 2023-03-20 | End: 2023-03-28 | Stop reason: HOSPADM

## 2023-03-20 RX ORDER — CARVEDILOL 25 MG/1
25 TABLET ORAL 2 TIMES DAILY WITH MEALS
Status: DISCONTINUED | OUTPATIENT
Start: 2023-03-20 | End: 2023-03-28 | Stop reason: HOSPADM

## 2023-03-20 RX ORDER — ACETAMINOPHEN 325 MG/1
650 TABLET ORAL EVERY 8 HOURS PRN
Status: DISCONTINUED | OUTPATIENT
Start: 2023-03-20 | End: 2023-03-24 | Stop reason: SDUPTHER

## 2023-03-20 RX ORDER — SODIUM CHLORIDE 9 MG/ML
INJECTION, SOLUTION INTRAVENOUS CONTINUOUS
Status: DISCONTINUED | OUTPATIENT
Start: 2023-03-20 | End: 2023-03-21

## 2023-03-20 RX ORDER — NALOXONE HCL 0.4 MG/ML
0.02 VIAL (ML) INJECTION
Status: DISCONTINUED | OUTPATIENT
Start: 2023-03-20 | End: 2023-03-28 | Stop reason: HOSPADM

## 2023-03-20 RX ORDER — FLUTICASONE PROPIONATE 50 MCG
2 SPRAY, SUSPENSION (ML) NASAL DAILY PRN
Status: DISCONTINUED | OUTPATIENT
Start: 2023-03-20 | End: 2023-03-28 | Stop reason: HOSPADM

## 2023-03-20 RX ADMIN — FERROUS SULFATE TAB 325 MG (65 MG ELEMENTAL FE) 1 EACH: 325 (65 FE) TAB at 08:03

## 2023-03-20 RX ADMIN — AZITHROMYCIN MONOHYDRATE 500 MG: 500 INJECTION, POWDER, LYOPHILIZED, FOR SOLUTION INTRAVENOUS at 05:03

## 2023-03-20 RX ADMIN — MELATONIN TAB 3 MG 6 MG: 3 TAB at 11:03

## 2023-03-20 RX ADMIN — HYDROCODONE BITARTRATE AND ACETAMINOPHEN 1 TABLET: 5; 325 TABLET ORAL at 11:03

## 2023-03-20 RX ADMIN — ALLOPURINOL 100 MG: 100 TABLET ORAL at 08:03

## 2023-03-20 RX ADMIN — CEFTRIAXONE 1 G: 1 INJECTION, POWDER, FOR SOLUTION INTRAMUSCULAR; INTRAVENOUS at 05:03

## 2023-03-20 RX ADMIN — SODIUM CHLORIDE: 9 INJECTION, SOLUTION INTRAVENOUS at 08:03

## 2023-03-20 RX ADMIN — CARVEDILOL 25 MG: 25 TABLET, FILM COATED ORAL at 08:03

## 2023-03-20 NOTE — NURSING
Pt arrived to floor via bed, teli monitor in place #[id number], aaox4, vs completed, bed locked, call light in reach, bed alarm on.

## 2023-03-20 NOTE — TELEPHONE ENCOUNTER
Call placed to patient's daughter (Citlali), advised patient needs to be evaluated either in office, urgent care, or ER.  Mrs. Godwin states she will not be able to get patient in for an office visit. States she will need to arrange transport. States she will have patient taken to ER for evaluation.

## 2023-03-20 NOTE — ED PROVIDER NOTES
Encounter Date: 3/20/2023    SCRIBE #1 NOTE: ISujey, am scribing for, and in the presence of,  Scott Nolen MD.     History     Chief Complaint   Patient presents with    Extremity Weakness     Pt had a fall Friday at his home and was seen at [facility], since being discharged pt reports weakness in both legs.         Time seen by provider: 1:42 PM on 03/20/2023    Richard Bustos is a 77 y.o. male with a PMHx of anemia, chronic kidney Dz, CHF, CAD, A-fib, DM, and MTHFR mutation on Coumadin who presents to the ED via EMS for evaluation of BLE weakness since a recent fall 3 days ago.  Patient reports walking down a ramp at home when he fell to his knees with fall continuing, resulting in a head injury.  He was evaluated at the time of the fall where he had an extensive workup without significant findings and was ultimately d/c to home.  He states at the time of d/c he was non-ambulatory.  Since the fall, the patient confirms he has not been able to bear weight secondary to BLE weakness.  He ambulates with a cane at baseline.  The patient notes accompanying mild abdominal pain, back pain, and diffuse bruising, but denies CP, painful urination, HA, unilateral weakness, or any other symptoms at this time.  Social Hx includes patient living with his daughter.  PSHx consist of cardiac stents and radiofrequency ablation of lumbar medial branch nerve at single level.      The history is provided by the patient.   Review of patient's allergies indicates:   Allergen Reactions    Donepezil Other (See Comments)     AMS    Bactroban [mupirocin calcium] Blisters     Causes Blisters    Shellfish containing products Other (See Comments)     Other reaction(s): Gout  OYSTERS     Past Medical History:   Diagnosis Date    Anemia     Anemia of other chronic disease 09/13/2017    Anemia, chronic renal failure 09/13/2017    Anemia, unspecified 09/13/2017    Anticoagulant long-term use     Aorta aneurysm     Arthritis     Atrial  fibrillation     Bacteremia due to Streptococcus 01/08/2022    CAD (coronary artery disease)     CHF (congestive heart failure)     Chronic kidney disease     Clotting disorder 09/13/2017    Colon polyp     Dementia     Diabetes mellitus     not on meds    Diabetes mellitus type II     Diverticulosis     Elevated PSA     Encounter for blood transfusion     Former smoker     General anesthetics causing adverse effect in therapeutic use     TROUBLE COMING OUT OF ANESTHESIA WITH GASTRIC BYPASS    GERD (gastroesophageal reflux disease)     Gout     Hx of colonic polyps     Hypercoagulable state     Hyperlipidemia     Hypertension     MI, old 2010    MTHFR mutation     Myocardial infarction     9/2010    Osteomyelitis of ankle or foot 03/09/2017    Prostate cancer 06/2016    Sleep apnea     Squamous cell carcinoma 01/23/2018    Left helix, imiquimod    Venous stasis     Chronic     Past Surgical History:   Procedure Laterality Date    ABDOMINAL SURGERY      CARDIAC SURGERY      3 STENTS     CAUDAL EPIDURAL STEROID INJECTION N/A 6/22/2020    Procedure: INJECTION, STEROID, SPINE, EPIDURAL, CAUDAL;  Surgeon: Evangelist Bose MD;  Location: [facility] OR;  Service: Pain Management;  Laterality: N/A;    COLONOSCOPY  02/2011    diverticulosis with diverticula with clot, no active bleeding    COLONOSCOPY N/A 8/24/2016    Procedure: COLONOSCOPY;  Surgeon: Saroj Borjas MD;  Location: [facility];  Service: Endoscopy;  Laterality: N/A;    COLONOSCOPY N/A 11/18/2020    Procedure: COLONOSCOPY;  Surgeon: Saroj Borjas MD;  Location: [facility];  Service: Endoscopy;  Laterality: N/A;    COLONOSCOPY N/A 10/27/2021    Procedure: COLONOSCOPY;  Surgeon: Saroj Borjas MD;  Location: [facility];  Service: Endoscopy;  Laterality: N/A;    EPIDURAL STEROID INJECTION INTO LUMBAR SPINE N/A 6/22/2020    Procedure: Injection-steroid-epidural-lumbar;  Surgeon: Evangelist Bose MD;  Location: [facility] OR;  Service: Pain Management;  Laterality: N/A;   L3 L4 L5 S1    EPIDURAL STEROID INJECTION INTO LUMBAR SPINE N/A 9/21/2020    Procedure: Injection-steroid-epidural-lumbar;  Surgeon: Evangelist Bose MD;  Location: [facility] OR;  Service: Pain Management;  Laterality: N/A;  L5-S1    GASTRIC BYPASS  2/5/2008    IVC FILTER RETRIEVAL      JOINT REPLACEMENT  1996 and 2001    bi-lat hip replacement/Rt Hip and Lt Hip    RADIOFREQUENCY ABLATION OF LUMBAR MEDIAL BRANCH NERVE AT SINGLE LEVEL Bilateral 1/10/2020    Procedure: Radiofrequency Ablation, Nerve, Spinal, Lumbar, Medial Branch, 1 Level;  Surgeon: Evangelist Bose MD;  Location: [facility] OR;  Service: Pain Management;  Laterality: Bilateral;  L3,L4,L5    RADIOFREQUENCY ABLATION OF LUMBAR MEDIAL BRANCH NERVE AT SINGLE LEVEL Bilateral 11/23/2021    Procedure: Radiofrequency Ablation, Nerve, Spinal, Lumbar, Medial Branch, Bilateral L 3,4,5;  Surgeon: Nile Causey MD;  Location: [facility] OR;  Service: Pain Management;  Laterality: Bilateral;    ROTATOR CUFF REPAIR      Rt shoulder    Stents  8/18/2010    x 3    UPPER GASTROINTESTINAL ENDOSCOPY  02/2011     Family History   Problem Relation Age of Onset    Heart attack Mother     Heart attack Father     Heart attack Brother     Ulcerative colitis Daughter 35    Lupus Daughter     Colon cancer Neg Hx     Colon polyps Neg Hx     Crohn's disease Neg Hx     Melanoma Neg Hx     Psoriasis Neg Hx     Eczema Neg Hx      Social History     Tobacco Use    Smoking status: Former    Smokeless tobacco: Never    Tobacco comments:     45 yrs ago, only smoked 3-4 packs in his entire life as a teenager   Substance Use Topics    Alcohol use: Not Currently     Comment: rare    Drug use: No     Review of Systems   Constitutional:  Negative for fever.   HENT:  Negative for sore throat.    Respiratory:  Negative for shortness of breath.    Cardiovascular:  Negative for chest pain.   Gastrointestinal:  Positive for abdominal pain. Negative for nausea.   Genitourinary:  Negative for dysuria.    Musculoskeletal:  Positive for back pain and gait problem.   Skin:  Positive for color change (bruising). Negative for rash.   Neurological:  Positive for weakness (BLE). Negative for headaches.   Hematological:  Does not bruise/bleed easily.     Physical Exam     Initial Vitals [03/20/23 1342]   BP Pulse Resp Temp SpO2   136/73 76 17 98.8 °F (37.1 °C) 98 %      MAP       --         Physical Exam    Nursing note and vitals reviewed.  Constitutional: He appears well-developed and well-nourished. He is not diaphoretic. No distress.   HENT:   Head: Normocephalic and atraumatic.   Mouth/Throat: Oropharynx is clear and moist.   Eyes: Conjunctivae are normal.   Neck: Neck supple.   Cardiovascular:  Normal rate, regular rhythm, normal heart sounds and intact distal pulses.     Exam reveals no gallop and no friction rub.       No murmur heard.  Pulses:       Dorsalis pedis pulses are 1+ on the right side and 1+ on the left side.        Posterior tibial pulses are 1+ on the right side and 1+ on the left side.   Pulmonary/Chest: Breath sounds normal. He has no wheezes. He has no rhonchi. He has no rales.   Abdominal: Abdomen is soft. He exhibits no distension. There is no abdominal tenderness.   Musculoskeletal:         General: Normal range of motion.      Cervical back: Neck supple. No bony tenderness.      Thoracic back: No bony tenderness.      Lumbar back: No bony tenderness.      Comments: No extremity TTP.       Neurological: He is alert and oriented to person, place, and time. He has normal strength. No cranial nerve deficit or sensory deficit.   Cranial nerves III through XII grossly intact. 5/5 strength with equal sensation over the BUE's and BLE's.   Skin: Ecchymosis noted. No rash noted. No erythema.   Diffuse patchy ecchymosis to the head and neck.         ED Course   Procedures  Labs Reviewed   CBC W/ AUTO DIFFERENTIAL - Abnormal; Notable for the following components:       Result Value    RBC 2.75 (*)      Hemoglobin 9.2 (*)     Hematocrit 27.4 (*)      (*)     MCH 33.5 (*)     RDW 15.1 (*)     Immature Granulocytes 0.6 (*)     Gran % 73.9 (*)     Lymph % 16.3 (*)     All other components within normal limits   COMPREHENSIVE METABOLIC PANEL - Abnormal; Notable for the following components:    Chloride 113 (*)     CO2 20 (*)     Glucose 171 (*)     BUN 32 (*)     Total Protein 5.6 (*)     Albumin 2.4 (*)     AST 46 (*)     Anion Gap 5 (*)     eGFR 52 (*)     All other components within normal limits   PROTIME-INR - Abnormal; Notable for the following components:    Prothrombin Time 27.7 (*)     INR 2.7 (*)     All other components within normal limits   URINALYSIS, REFLEX TO URINE CULTURE - Abnormal; Notable for the following components:    Protein, UA 3+ (*)     All other components within normal limits    Narrative:     Specimen Source->Urine   URINALYSIS MICROSCOPIC    Narrative:     Specimen Source->Urine   PROCALCITONIN   CK          Imaging Results              CT Head Without Contrast (Final result)  Result time 03/20/23 15:05:03      Final result by Oracio García MD (03/20/23 15:05:03)                   Impression:      Frontal scalp hematoma.  No acute intracranial injury.    Bilateral nasal bone fractures.      Electronically signed by: Oracio García MD  Date:    03/20/2023  Time:    15:05               Narrative:    EXAMINATION:  CT HEAD WITHOUT CONTRAST    CLINICAL HISTORY:  Head trauma, minor (Age >= 65y);    TECHNIQUE:  Low dose axial images were obtained through the head.  Coronal and sagittal reformations were also performed. Contrast was not administered.    COMPARISON:  03/17/2023    FINDINGS:  There is a large bifrontal scalp hematoma, without significant change.  There is no intracranial hemorrhage.  No extra-axial collection.  Gray-white demonstration is well maintained the ventricles are nondilated.  There are mild chronic white matter changes.  No mass or midline shift.  There  is calcification of the intracranial arteries.  There are bilateral nasal bone fractures.                                       X-Ray Chest 1 View (Final result)  Result time 03/20/23 14:45:39      Final result by Bernie Lyon MD (03/20/23 14:45:39)                   Impression:      Somewhat nodular patchy opacification left perihilar most likely pneumonia but recommend follow-up to be sure that there is complete clearing      Electronically signed by: Bernie Lyon MD  Date:    03/20/2023  Time:    14:45               Narrative:    EXAMINATION:  XR CHEST 1 VIEW    CLINICAL HISTORY:  Weakness, r/o pna;    TECHNIQUE:  Single frontal view of the chest was performed.    COMPARISON:  03/17/2023    FINDINGS:  Stable cardiomediastinal silhouette.  Patchy somewhat nodular opacities left perihilar which may represent mild infiltrate.  Suggest follow-up to ever to be sure there is complete clearing and more reliably evaluate for underlying pulmonary nodule.  No infiltrate seen in the right lung.  The costophrenic sulci are sharp.                                  (radiology reading, visualized by me)       Medications   cefTRIAXone (ROCEPHIN) 1 g in dextrose 5 % in water (D5W) 5 % 50 mL IVPB (MB+) (has no administration in time range)   azithromycin (ZITHROMAX) 500 mg in dextrose 5 % (D5W) 250 mL IVPB (Vial-Mate) (has no administration in time range)     Medical Decision Making:   History:   Old Medical Records: I decided to obtain old medical records.  Independently Interpreted Test(s):   I have ordered and independently interpreted EKG Reading(s) - see prior notes  Clinical Tests:   Lab Tests: Ordered and Reviewed  Radiological Study: Ordered and Reviewed  Medical Tests: Ordered and Reviewed        Scribe Attestation:   Scribe #1: I performed the above scribed service and the documentation accurately describes the services I performed. I attest to the accuracy of the note.      ED Course as of 03/20/23 1635   Mon  Mar 20, 2023   1412 EKG:  Atrial fibrillation, rate of 65, wide QRS with old RBBB.  There are no acute ST or T wave changes suggestive of acute ischemia or infarction.   [MR]      ED Course User Index  [MR] Scott Nolen MD               I, Dr. Scott Nolen, personally performed the services described in this documentation. All medical record entries made by the scribe were at my direction and in my presence.  I have reviewed the chart and agree that the record reflects my personal performance and is accurate and complete. Scott Nolen MD.  4:32 PM 03/20/2023    Richard Bustos is a 77 y.o. male presenting with generalized weakness in this patient with recent fall.  There is no sign of spinal cord injury or focal neurological deficit.  Low suspicion for CVA.  Repeat head CT shows no sign of delayed intracranial hemorrhage.  INR is therapeutic.  I do not he requires reversal.  It sounds as if conditioning was not ideal to begin with, although abrupt change noted for unclear reasons.  No clear source of infectious focus.  Chest x-ray reviewed with history not necessarily supportive of pneumonia.  I will discuss with medicine and defer decision for antibiotics to them.  Does require admission as he has significant assistance with ambulatory support as well as family members at home and is still struggling even with transfers.  He is chronic AFib that is rate controlled.  I doubt ACS.  I do not think cardiac biomarker is indicated.  There is no obvious sign of new focal metabolic or infectious etiology.  I will admit for observation and further workup with supportive care including potential PT / OT and decision on placement.      Clinical Impression:   Final diagnoses:  [R53.1] Generalized weakness  [R26.2] Difficulty walking (Primary)  [J18.9] Pneumonia        ED Disposition Condition    Observation                 Scott Nolen MD  03/20/23 3384

## 2023-03-21 PROBLEM — S12.501A CLOSED NONDISPLACED FRACTURE OF SIXTH CERVICAL VERTEBRA: Status: ACTIVE | Noted: 2023-03-21

## 2023-03-21 LAB
ANION GAP SERPL CALC-SCNC: 4 MMOL/L (ref 8–16)
BASOPHILS # BLD AUTO: 0.01 K/UL (ref 0–0.2)
BASOPHILS NFR BLD: 0.1 % (ref 0–1.9)
BUN SERPL-MCNC: 28 MG/DL (ref 8–23)
CALCIUM SERPL-MCNC: 8.6 MG/DL (ref 8.7–10.5)
CHLORIDE SERPL-SCNC: 113 MMOL/L (ref 95–110)
CO2 SERPL-SCNC: 22 MMOL/L (ref 23–29)
CREAT SERPL-MCNC: 1.3 MG/DL (ref 0.5–1.4)
DIFFERENTIAL METHOD: ABNORMAL
EOSINOPHIL # BLD AUTO: 0.1 K/UL (ref 0–0.5)
EOSINOPHIL NFR BLD: 1.6 % (ref 0–8)
ERYTHROCYTE [DISTWIDTH] IN BLOOD BY AUTOMATED COUNT: 14.8 % (ref 11.5–14.5)
EST. GFR  (NO RACE VARIABLE): 57 ML/MIN/1.73 M^2
GLUCOSE SERPL-MCNC: 112 MG/DL (ref 70–110)
HCT VFR BLD AUTO: 26.7 % (ref 40–54)
HGB BLD-MCNC: 9.1 G/DL (ref 14–18)
IMM GRANULOCYTES # BLD AUTO: 0.04 K/UL (ref 0–0.04)
IMM GRANULOCYTES NFR BLD AUTO: 0.6 % (ref 0–0.5)
INR PPP: 2.5 (ref 0.8–1.2)
LYMPHOCYTES # BLD AUTO: 1.3 K/UL (ref 1–4.8)
LYMPHOCYTES NFR BLD: 19.2 % (ref 18–48)
MAGNESIUM SERPL-MCNC: 1.7 MG/DL (ref 1.6–2.6)
MCH RBC QN AUTO: 33.3 PG (ref 27–31)
MCHC RBC AUTO-ENTMCNC: 34.1 G/DL (ref 32–36)
MCV RBC AUTO: 98 FL (ref 82–98)
MONOCYTES # BLD AUTO: 0.6 K/UL (ref 0.3–1)
MONOCYTES NFR BLD: 8 % (ref 4–15)
NEUTROPHILS # BLD AUTO: 4.8 K/UL (ref 1.8–7.7)
NEUTROPHILS NFR BLD: 70.5 % (ref 38–73)
NRBC BLD-RTO: 0 /100 WBC
PLATELET # BLD AUTO: 197 K/UL (ref 150–450)
PMV BLD AUTO: 10.9 FL (ref 9.2–12.9)
POTASSIUM SERPL-SCNC: 4.7 MMOL/L (ref 3.5–5.1)
PROTHROMBIN TIME: 26 SEC (ref 9–12.5)
RBC # BLD AUTO: 2.73 M/UL (ref 4.6–6.2)
SODIUM SERPL-SCNC: 139 MMOL/L (ref 136–145)
TSH SERPL DL<=0.005 MIU/L-ACNC: 1.01 UIU/ML (ref 0.4–4)
WBC # BLD AUTO: 6.86 K/UL (ref 3.9–12.7)

## 2023-03-21 PROCEDURE — 83735 ASSAY OF MAGNESIUM: CPT | Performed by: NURSE PRACTITIONER

## 2023-03-21 PROCEDURE — 85610 PROTHROMBIN TIME: CPT | Performed by: NURSE PRACTITIONER

## 2023-03-21 PROCEDURE — 99900035 HC TECH TIME PER 15 MIN (STAT)

## 2023-03-21 PROCEDURE — 25000003 PHARM REV CODE 250: Performed by: NURSE PRACTITIONER

## 2023-03-21 PROCEDURE — 94761 N-INVAS EAR/PLS OXIMETRY MLT: CPT

## 2023-03-21 PROCEDURE — G0378 HOSPITAL OBSERVATION PER HR: HCPCS

## 2023-03-21 PROCEDURE — 84443 ASSAY THYROID STIM HORMONE: CPT | Performed by: NURSE PRACTITIONER

## 2023-03-21 PROCEDURE — 97162 PT EVAL MOD COMPLEX 30 MIN: CPT

## 2023-03-21 PROCEDURE — 86580 TB INTRADERMAL TEST: CPT | Performed by: NURSE PRACTITIONER

## 2023-03-21 PROCEDURE — 36415 COLL VENOUS BLD VENIPUNCTURE: CPT | Performed by: NURSE PRACTITIONER

## 2023-03-21 PROCEDURE — 97110 THERAPEUTIC EXERCISES: CPT

## 2023-03-21 PROCEDURE — 85025 COMPLETE CBC W/AUTO DIFF WBC: CPT | Performed by: NURSE PRACTITIONER

## 2023-03-21 PROCEDURE — 30200315 PPD INTRADERMAL TEST REV CODE 302: Performed by: NURSE PRACTITIONER

## 2023-03-21 PROCEDURE — 80048 BASIC METABOLIC PNL TOTAL CA: CPT | Performed by: NURSE PRACTITIONER

## 2023-03-21 PROCEDURE — 97165 OT EVAL LOW COMPLEX 30 MIN: CPT

## 2023-03-21 PROCEDURE — 63600175 PHARM REV CODE 636 W HCPCS: Performed by: NURSE PRACTITIONER

## 2023-03-21 PROCEDURE — 97112 NEUROMUSCULAR REEDUCATION: CPT

## 2023-03-21 RX ORDER — DIAZEPAM 5 MG/1
5 TABLET ORAL ONCE
Status: COMPLETED | OUTPATIENT
Start: 2023-03-21 | End: 2023-03-21

## 2023-03-21 RX ORDER — LISINOPRIL 40 MG/1
40 TABLET ORAL NIGHTLY
Status: DISCONTINUED | OUTPATIENT
Start: 2023-03-21 | End: 2023-03-28 | Stop reason: HOSPADM

## 2023-03-21 RX ADMIN — AZITHROMYCIN MONOHYDRATE 500 MG: 500 INJECTION, POWDER, LYOPHILIZED, FOR SOLUTION INTRAVENOUS at 05:03

## 2023-03-21 RX ADMIN — WARFARIN SODIUM 2.5 MG: 2.5 TABLET ORAL at 04:03

## 2023-03-21 RX ADMIN — CARVEDILOL 25 MG: 25 TABLET, FILM COATED ORAL at 04:03

## 2023-03-21 RX ADMIN — ASPIRIN 81 MG: 81 TABLET, COATED ORAL at 08:03

## 2023-03-21 RX ADMIN — TUBERCULIN PURIFIED PROTEIN DERIVATIVE 5 UNITS: 5 INJECTION, SOLUTION INTRADERMAL at 04:03

## 2023-03-21 RX ADMIN — LISINOPRIL 40 MG: 40 TABLET ORAL at 09:03

## 2023-03-21 RX ADMIN — DIAZEPAM 5 MG: 5 TABLET ORAL at 01:03

## 2023-03-21 RX ADMIN — CLOPIDOGREL BISULFATE 75 MG: 75 TABLET ORAL at 08:03

## 2023-03-21 RX ADMIN — FERROUS SULFATE TAB 325 MG (65 MG ELEMENTAL FE) 1 EACH: 325 (65 FE) TAB at 08:03

## 2023-03-21 RX ADMIN — FAMOTIDINE 40 MG: 20 TABLET ORAL at 08:03

## 2023-03-21 RX ADMIN — DOCUSATE SODIUM AND SENNOSIDES 1 TABLET: 8.6; 5 TABLET, FILM COATED ORAL at 08:03

## 2023-03-21 RX ADMIN — CALCITRIOL CAPSULES 0.25 MCG 0.75 MCG: 0.25 CAPSULE ORAL at 08:03

## 2023-03-21 RX ADMIN — CEFTRIAXONE 1 G: 1 INJECTION, POWDER, FOR SOLUTION INTRAMUSCULAR; INTRAVENOUS at 04:03

## 2023-03-21 RX ADMIN — CARVEDILOL 25 MG: 25 TABLET, FILM COATED ORAL at 09:03

## 2023-03-21 NOTE — HOSPITAL COURSE
Richard Bustos was monitored closely during his hospitalization.  He was placed on IV rocephin and IV azithromycin in treatment of pneumonia.  An xray of his right shoulder was performed due to continued c/o pain and decreased ROM following his fall with no fracture or dislocation seen.  PT/OT were consulted.  An MRI of the brain was obtained with no acute findings.  MRI cervical spine concerning for C6 fractures, neural foraminal and spinal canal stenosis, severe cansl stenosis cord compression C5-6 and mild to moderate cord flattening at C3-4, and C4-5, and other findings compatible with possible trauma as correlates with given history.  MRI lumbar spine showed multilevel degenerative changes, spinal canal stenosis and neural foraminal stenosis.  It was the recommendation of the neurosurgeon that pt undergo posterior cervical decompression/fusion C3-T1 with lumbar surgery to follow at a later date.  Due to the complexity of his health history, cardiology and hematology were consulted.  ASA and plavix were held. Coumadin was held and he was placed on a heparin drip in anticipation of surgery.  Cardiology recommended nuclear med stress test.  He was transferred to [facility] for come and go procedure-NM stress test with no reversible ischemia noted, so he received cardiac clearance for surgery.  On 3/24/23, Dr. Kc performed laminectomy, medial facetectomy of C2-T1.  Postoperatively, he continued to work with PT/OT.  Hard cervical collar was kept in place.  Pain was controlled with IV and oral narcotics.  He experienced some bouts of confusion, but was easily reoriented.  He was cleared from Neurology standpoint to resume anticoagulation.  Case was discussed with Cardiology who recommended Lovenox bridging and to restart his Coumadin.  Patient's family refused Lovenox despite risks versus benefits of bridge dosing and have understanding that Coumadin will take longer to reach therapeutic anticoagulation levels.  Patient  was cleared from all specialties for skilled nursing facility.  He was accepted and transferred to [facility] nursing [facility].    Physical exam:  Awake, alert, appropriate. No acute distress  Cardiac:  RRR  Pulmonary:  Clear to auscultation  Abd: soft, non-tender

## 2023-03-21 NOTE — PLAN OF CARE
Problem: Adult Inpatient Plan of Care  Goal: Plan of Care Review  Outcome: Ongoing, Progressing     Problem: Adult Inpatient Plan of Care  Goal: Absence of Hospital-Acquired Illness or Injury  Outcome: Ongoing, Progressing     Problem: Adult Inpatient Plan of Care  Goal: Optimal Comfort and Wellbeing  Outcome: Ongoing, Progressing     Problem: Skin Injury Risk Increased  Goal: Skin Health and Integrity  Outcome: Ongoing, Progressing     Problem: Impaired Wound Healing  Goal: Optimal Wound Healing  Outcome: Ongoing, Progressing

## 2023-03-21 NOTE — PLAN OF CARE
Problem: Adult Inpatient Plan of Care  Goal: Optimal Comfort and Wellbeing  Outcome: Ongoing, Progressing     Problem: Infection (Pneumonia)  Goal: Resolution of Infection Signs and Symptoms  Outcome: Ongoing, Progressing     Problem: Fall Injury Risk  Goal: Absence of Fall and Fall-Related Injury  Outcome: Ongoing, Progressing   Pt remains in bed with cardiac monitor in place, re orientation required throughout shift. IVF @ 75ml/hr. Incontinence care provided. Wound care provided. Nadja shift encouraged throughout shift, Turns done with incontinence care. NO signs of distress noted at this time. Bed alarm is set, call light in reach,

## 2023-03-21 NOTE — PT/OT/SLP EVAL
Physical Therapy Evaluation    Patient Name:  Richard Bustos   MRN:  [id number]    Recommendations:     Discharge Recommendations: nursing facility, skilled   Discharge Equipment Recommendations: none   Barriers to discharge: Decreased caregiver support    Assessment:     Richard Bustos is a 77 y.o. male admitted with a medical diagnosis of Pneumonia.  He presents with the following impairments/functional limitations: weakness, impaired endurance, impaired functional mobility, impaired balance, decreased upper extremity function, decreased lower extremity function, decreased ROM, impaired skin, impaired cardiopulmonary response to activity, orthopedic precautions .    Pt seen supine in bed with facial ecchymosis/nasal fx post falling at home. 2 daughters at bedside who stated pt was previously indep and driving but had progressive weakness with inability to ambulate since fall 5 days ago. CT is positive for C6 fx and severe foraminal stenosis at C6-7. Presents with quadriparesis. Pt requiring mod/max assist to sit EOB with difficulty maintaining static sitting balance and falling backwards. Sit to stand max assist 3x with improving buttock clearance at second and third attempt. No OOB to chair today as pt requiring MRI.  Pt to benefit from SNF.    Rehab Prognosis: Fair; patient would benefit from acute skilled PT services to address these deficits and reach maximum level of function.    Recent Surgery: * No surgery found *      Plan:     During this hospitalization, patient to be seen 6 x/week to address the identified rehab impairments via gait training, therapeutic activities, therapeutic exercises, neuromuscular re-education and progress toward the following goals:    Plan of Care Expires:  04/30/23    Subjective   Daughters stated pt with inability to stand nor ambulate since the fall. Weakness of UE's with  assist required for feeding.  Daughters stressing that pt is not right- was indep and driving prior to  fall.  Chief Complaint: weakness  Patient/Family Comments/goals: none stated  Pain/Comfort:  Pain Rating 1: 0/10    Patients cultural, spiritual, Quaker conflicts given the current situation:      Living Environment:  Was home with daughter  Prior to admission, patients level of function was ambulatory and indep prior to fall 5 days ago.  Equipment used at home: cane, quad, cane, straight, rollator.  DME owned (not currently used): none.  Upon discharge, patient will have assistance from family.    Objective:     Communicated with nurse Jennifer prior to session.  Patient found HOB elevated with bed alarm, peripheral IV, telemetry  upon PT entry to room.    General Precautions: Standard, fall, anti-coagulation medicine  Orthopedic Precautions:spinal precautions (C6 fx)   Braces: N/A  Respiratory Status: Room air    Exams:  Postural Exam:  Patient presented with the following abnormalities:    -       Rounded shoulders  -       Forward head  -       BMI 36  RLE ROM: WFL  RLE Strength: Deficits: 3-/5 knees and ankle and 2/5 hip bilaterally  LLE ROM: WFL  LLE Strength: Deficits: 3-/5 knees and 2/5 hip    Functional Mobility:  Bed Mobility:     Rolling Left:  moderate assistance  Scooting: maximal assistance  Supine to Sit: moderate assistance and maximal assistance  Sit to Supine: maximal assistance  Transfers:     Sit to Stand:  maximal assistance and of 2 persons with no AD      AM-PAC 6 CLICK MOBILITY  Total Score:9       Treatment & Education:  Patient was educated on the importance of OOB activity and functional mobility to negate negative effects of prolonged bed rest during hospitalization, safe transfers and ambulation, and D/C planning   Thera ex in supine with AP,QS/GS,SLR  EOB sitting with min assist for static sitting balance  Pt able to stand with max assist with total buttock clearance    Patient left HOB elevated with all lines intact, call button in reach, bed alarm on, nurse Jennifer notified, and  daughters present.    GOALS:   Multidisciplinary Problems       Physical Therapy Goals          Problem: Physical Therapy    Goal Priority Disciplines Outcome Goal Variances Interventions   Physical Therapy Goal     PT, PT/OT Ongoing, Progressing     Description: Goals to be met by: 2023     Patient will increase functional independence with mobility by performin. Supine to sit with MInimal Assistance  2. Sit to stand transfer with Moderate Assistance  3. Bed to chair transfer with Maximum Assistance using Rolling Walker  4. Gait  x 10 feet with Maximum Assistance using Rolling Walker.   5. Sitting at edge of bed x20 minutes with Minimal Assistance  3. Lower extremity exercise program x20 reps                        History:     Past Medical History:   Diagnosis Date    Anemia     Anemia of other chronic disease 2017    Anemia, chronic renal failure 2017    Anemia, unspecified 2017    Anticoagulant long-term use     Aorta aneurysm     Arthritis     Atrial fibrillation     Bacteremia due to Streptococcus 2022    CAD (coronary artery disease)     CHF (congestive heart failure)     Chronic kidney disease     Clotting disorder 2017    Colon polyp     Dementia     Diabetes mellitus     not on meds    Diabetes mellitus type II     Diverticulosis     Elevated PSA     Encounter for blood transfusion     Former smoker     General anesthetics causing adverse effect in therapeutic use     TROUBLE COMING OUT OF ANESTHESIA WITH GASTRIC BYPASS    GERD (gastroesophageal reflux disease)     Gout     Hx of colonic polyps     Hypercoagulable state     Hyperlipidemia     Hypertension     MI, old     MTHFR mutation     Myocardial infarction     2010    Osteomyelitis of ankle or foot 2017    Prostate cancer 2016    Sleep apnea     Squamous cell carcinoma 2018    Left helix, imiquimod    Venous stasis     Chronic       Past Surgical History:   Procedure Laterality Date     ABDOMINAL SURGERY      CARDIAC SURGERY      3 STENTS     CAUDAL EPIDURAL STEROID INJECTION N/A 6/22/2020    Procedure: INJECTION, STEROID, SPINE, EPIDURAL, CAUDAL;  Surgeon: Evangelist Bose MD;  Location: [facility] OR;  Service: Pain Management;  Laterality: N/A;    COLONOSCOPY  02/2011    diverticulosis with diverticula with clot, no active bleeding    COLONOSCOPY N/A 8/24/2016    Procedure: COLONOSCOPY;  Surgeon: Saroj Borjas MD;  Location: [facility] ENDO;  Service: Endoscopy;  Laterality: N/A;    COLONOSCOPY N/A 11/18/2020    Procedure: COLONOSCOPY;  Surgeon: Saroj Borjas MD;  Location: [facility] ENDO;  Service: Endoscopy;  Laterality: N/A;    COLONOSCOPY N/A 10/27/2021    Procedure: COLONOSCOPY;  Surgeon: Saroj Borjas MD;  Location: [facility] ENDO;  Service: Endoscopy;  Laterality: N/A;    EPIDURAL STEROID INJECTION INTO LUMBAR SPINE N/A 6/22/2020    Procedure: Injection-steroid-epidural-lumbar;  Surgeon: Evangelist Bose MD;  Location: [facility] OR;  Service: Pain Management;  Laterality: N/A;  L3 L4 L5 S1    EPIDURAL STEROID INJECTION INTO LUMBAR SPINE N/A 9/21/2020    Procedure: Injection-steroid-epidural-lumbar;  Surgeon: Evangelist Bose MD;  Location: [facility] OR;  Service: Pain Management;  Laterality: N/A;  L5-S1    GASTRIC BYPASS  2/5/2008    IVC FILTER RETRIEVAL      JOINT REPLACEMENT  1996 and 2001    bi-lat hip replacement/Rt Hip and Lt Hip    RADIOFREQUENCY ABLATION OF LUMBAR MEDIAL BRANCH NERVE AT SINGLE LEVEL Bilateral 1/10/2020    Procedure: Radiofrequency Ablation, Nerve, Spinal, Lumbar, Medial Branch, 1 Level;  Surgeon: Evangelist Bose MD;  Location: [facility] OR;  Service: Pain Management;  Laterality: Bilateral;  L3,L4,L5    RADIOFREQUENCY ABLATION OF LUMBAR MEDIAL BRANCH NERVE AT SINGLE LEVEL Bilateral 11/23/2021    Procedure: Radiofrequency Ablation, Nerve, Spinal, Lumbar, Medial Branch, Bilateral L 3,4,5;  Surgeon: Nile Causey MD;  Location: [facility] OR;  Service: Pain Management;  Laterality: Bilateral;     ROTATOR CUFF REPAIR      Rt shoulder    Stents  8/18/2010    x 3    UPPER GASTROINTESTINAL ENDOSCOPY  02/2011       Time Tracking:     PT Received On: 03/21/23  PT Start Time: 1014     PT Stop Time: 1052  PT Total Time (min): 38 min     Billable Minutes: Evaluation 10, Therapeutic Exercise 13, and Neuromuscular Re-education 15      03/21/2023

## 2023-03-21 NOTE — NURSING
Pt refusing to keep monitor on at this time. Notified charge nurse and NP. Explained the importance of Cardiac monitor, pt continues to pull monitor off chest.

## 2023-03-21 NOTE — PLAN OF CARE
03/21/23 1445   ROSA Message   Medicare Outpatient and Observation Notification regarding financial responsibility Given to patient/caregiver;Explained to patient/caregiver;Signed/date by patient/caregiver   Date ROSA was signed 03/21/23   Time ROSA was signed 1445     ROSA explained to pt's daughter, Citlali, over the phone. Citlali verbalized understanding and gave telephone consent for form.

## 2023-03-21 NOTE — ASSESSMENT & PLAN NOTE
IV azithromycin, IV rocephin  brochodilators prn  Supplemental oxygen as needed   Check procalcitonin  Sputum culture

## 2023-03-21 NOTE — ASSESSMENT & PLAN NOTE
Patient with Long standing persistent (>12 months) atrial fibrillation which is controlled currently with Beta Blocker. Patient is currently in atrial fibrillation.[id number] Score: 4.  Anticoagulation indicated. Anticoagulation done with warfarin..

## 2023-03-21 NOTE — H&P
[facility] Medicine  History & Physical    Patient Name: Richard Bustos  MRN: [id number]  Patient Class: OP- Observation  Admission Date: 3/20/2023  Attending Physician: Alona Jackson MD   Primary Care Provider: Familia Ramirez Jr, MD         Patient information was obtained from patient, past medical records and ER records.     Subjective:     Principal Problem:Pneumonia    Chief Complaint:   Chief Complaint   Patient presents with    Extremity Weakness     Pt had a fall Friday at his home and was seen at [facility], since being discharged pt reports weakness in both legs.            HPI: Richard Bustos is a 78 y/o M with a past medical history significant for anemia, CAD, CHF, CKD, atrial fibrillation on warfarin, DM2, HLD, HTN, and GERD who presented to the ED for further evaluation of BLE weakness which began following a fall 3 days ago.  Pt reports that he was walking down a ramp at home when he slipped due to the ramp being wet.  He fell to his knees, but continued the fall onto his face and head and had positive loss of consciousness.  He was initially seen at [facility] ED where he was transferred to [facility].  He reports an extensive workup with no fractures seen and was ultimately discharged home.  He reports difficulty bearing weight since the fall and feels substantially weaker than his baseline.  He reports that he normally uses a cane for ambulation, but has not been able to ambulate at all since his fall 3 days ago.  Today in the ED, CT head is significant for frontal scalp hematoma with no acute intracranial injury seen; bilateral nasal bone fractures are noted.  CXR is concerning for pneumonia.  He will be placed in observation under the service of hospital medicine for continued monitoring and treatment.      Past Medical History:   Diagnosis Date    Anemia     Anemia of other chronic disease 09/13/2017    Anemia, chronic renal failure 09/13/2017    Anemia, unspecified 09/13/2017     Anticoagulant long-term use     Aorta aneurysm     Arthritis     Atrial fibrillation     Bacteremia due to Streptococcus 01/08/2022    CAD (coronary artery disease)     CHF (congestive heart failure)     Chronic kidney disease     Clotting disorder 09/13/2017    Colon polyp     Dementia     Diabetes mellitus     not on meds    Diabetes mellitus type II     Diverticulosis     Elevated PSA     Encounter for blood transfusion     Former smoker     General anesthetics causing adverse effect in therapeutic use     TROUBLE COMING OUT OF ANESTHESIA WITH GASTRIC BYPASS    GERD (gastroesophageal reflux disease)     Gout     Hx of colonic polyps     Hypercoagulable state     Hyperlipidemia     Hypertension     MI, old 2010    MTHFR mutation     Myocardial infarction     9/2010    Osteomyelitis of ankle or foot 03/09/2017    Prostate cancer 06/2016    Sleep apnea     Squamous cell carcinoma 01/23/2018    Left helix, imiquimod    Venous stasis     Chronic       Past Surgical History:   Procedure Laterality Date    ABDOMINAL SURGERY      CARDIAC SURGERY      3 STENTS     CAUDAL EPIDURAL STEROID INJECTION N/A 6/22/2020    Procedure: INJECTION, STEROID, SPINE, EPIDURAL, CAUDAL;  Surgeon: Evangelist Bose MD;  Location: [facility] OR;  Service: Pain Management;  Laterality: N/A;    COLONOSCOPY  02/2011    diverticulosis with diverticula with clot, no active bleeding    COLONOSCOPY N/A 8/24/2016    Procedure: COLONOSCOPY;  Surgeon: Saroj Borjas MD;  Location: [facility];  Service: Endoscopy;  Laterality: N/A;    COLONOSCOPY N/A 11/18/2020    Procedure: COLONOSCOPY;  Surgeon: Saroj Borjas MD;  Location: [facility];  Service: Endoscopy;  Laterality: N/A;    COLONOSCOPY N/A 10/27/2021    Procedure: COLONOSCOPY;  Surgeon: Saroj Borjas MD;  Location: [facility] ENDO;  Service: Endoscopy;  Laterality: N/A;    EPIDURAL STEROID INJECTION INTO LUMBAR SPINE N/A 6/22/2020    Procedure:  Injection-steroid-epidural-lumbar;  Surgeon: Evangelist Bose MD;  Location: [facility] OR;  Service: Pain Management;  Laterality: N/A;  L3 L4 L5 S1    EPIDURAL STEROID INJECTION INTO LUMBAR SPINE N/A 9/21/2020    Procedure: Injection-steroid-epidural-lumbar;  Surgeon: Evangelist Bose MD;  Location: [facility] OR;  Service: Pain Management;  Laterality: N/A;  L5-S1    GASTRIC BYPASS  2/5/2008    IVC FILTER RETRIEVAL      JOINT REPLACEMENT  1996 and 2001    bi-lat hip replacement/Rt Hip and Lt Hip    RADIOFREQUENCY ABLATION OF LUMBAR MEDIAL BRANCH NERVE AT SINGLE LEVEL Bilateral 1/10/2020    Procedure: Radiofrequency Ablation, Nerve, Spinal, Lumbar, Medial Branch, 1 Level;  Surgeon: Evangelist Bose MD;  Location: [facility] OR;  Service: Pain Management;  Laterality: Bilateral;  L3,L4,L5    RADIOFREQUENCY ABLATION OF LUMBAR MEDIAL BRANCH NERVE AT SINGLE LEVEL Bilateral 11/23/2021    Procedure: Radiofrequency Ablation, Nerve, Spinal, Lumbar, Medial Branch, Bilateral L 3,4,5;  Surgeon: Nile Causey MD;  Location: [facility] OR;  Service: Pain Management;  Laterality: Bilateral;    ROTATOR CUFF REPAIR      Rt shoulder    Stents  8/18/2010    x 3    UPPER GASTROINTESTINAL ENDOSCOPY  02/2011       Review of patient's allergies indicates:   Allergen Reactions    Donepezil Other (See Comments)     AMS    Bactroban [mupirocin calcium] Blisters     Causes Blisters    Shellfish containing products Other (See Comments)     Other reaction(s): Gout  OYSTERS       Current Facility-Administered Medications on File Prior to Encounter   Medication    lactated ringers infusion     Current Outpatient Medications on File Prior to Encounter   Medication Sig    aspirin (ECOTRIN) 81 MG EC tablet Take 81 mg by mouth once daily.    allopurinoL (ZYLOPRIM) 100 MG tablet Take 1 tablet (100 mg total) by mouth every evening.    atorvastatin (LIPITOR) 80 MG tablet Take 1 tablet (80 mg total) by mouth once daily.    calcitRIOL (ROCALTROL) 0.5 MCG Cap  0.75 mcg once daily.     carvediloL (COREG) 25 MG tablet Take 1 tablet (25 mg total) by mouth 2 (two) times daily with meals.    clopidogreL (PLAVIX) 75 mg tablet Take 1 tablet (75 mg total) by mouth once daily.    famotidine (PEPCID) 40 MG tablet Take 1 tablet (40 mg total) by mouth once daily.    ferrous sulfate (FEROSUL) 325 mg (65 mg iron) Tab tablet TAKE 1 TABLET BY MOUTH TWICE DAILY    fluticasone propionate (FLONASE) 50 mcg/actuation nasal spray SHAKE LIQUID AND USE 2 SPRAYS(100 MCG) IN EACH NOSTRIL EVERY DAY    FOLIC ACID/MULTIVIT-MIN/LUTEIN (CENTRUM SILVER ORAL) Take 1 tablet by mouth once daily.    HYDROcodone-acetaminophen (NORCO) 5-325 mg per tablet Take 1 tablet by mouth every 8 (eight) hours as needed for Pain.    LIDOcaine (LIDODERM) 5 % Place 1 patch onto the skin once daily. Remove & Discard patch within 12 hours or as directed by MD    lisinopriL (PRINIVIL,ZESTRIL) 40 MG tablet Take 1 tablet (40 mg total) by mouth every evening.    magnesium oxide (MAG-OX) 400 mg (241.3 mg magnesium) tablet Take 1 tablet (400 mg total) by mouth once daily.    mecobal-levomefolat Ca-B6 phos (L-METHYL-B6-B12) 3-35-2 mg Tab Take 1 tablet by mouth 2 (two) times daily.    mirabegron (MYRBETRIQ) 50 mg Tb24 Take 1 tablet (50 mg total) by mouth once daily.    nitroGLYCERIN 0.4 MG/DOSE TL SPRY (NITROLINGUAL) 400 mcg/spray spray PLACE 1 SPRAY UNDER THE TONGUE EVERY 5 MINUTES AS NEEDED FOR CHEST PAIN    nystatin (MYCOSTATIN) powder Apply topically 2 (two) times daily.    omeprazole (PRILOSEC) 20 MG capsule Take 1 capsule (20 mg total) by mouth once daily.    prothrombin time/INR test metr Misc 1 Stick by Misc.(Non-Drug; Combo Route) route every 30 days.    traZODone (DESYREL) 50 MG tablet Take 1 tablet (50 mg total) by mouth every evening.    vit A/vit C/vit E/zinc/copper (PRESERVISION AREDS ORAL) Take by mouth 2 (two) times a day.     warfarin (COUMADIN) 5 MG tablet 5 mg except on wednesday and saturday  Wed and sat 2.5 mgs (Patient taking differently: Take 2.5 mg by mouth Daily. 5 mg except on wednesday and saturday Wed and sat 2.5 mgs)     Family History       Problem Relation (Age of Onset)    Heart attack Mother, Father, Brother    Lupus Daughter    Ulcerative colitis Daughter (35)          Tobacco Use    Smoking status: Former    Smokeless tobacco: Never    Tobacco comments:     45 yrs ago, only smoked 3-4 packs in his entire life as a teenager   Substance and Sexual Activity    Alcohol use: Not Currently     Comment: rare    Drug use: No    Sexual activity: Not Currently     Review of Systems   Constitutional:  Positive for fatigue. Negative for chills and fever.   HENT:  Negative for congestion.    Respiratory:  Negative for chest tightness and shortness of breath.    Cardiovascular:  Negative for chest pain and palpitations.   Gastrointestinal:  Negative for abdominal pain, diarrhea, nausea and vomiting.   Genitourinary:  Negative for dysuria and hematuria.   Musculoskeletal:  Positive for arthralgias, gait problem and myalgias.   Skin:  Positive for color change and wound.   Neurological:  Positive for weakness. Negative for headaches.   Hematological:  Bruises/bleeds easily.   Psychiatric/Behavioral:  Negative for agitation and confusion.    All other systems reviewed and are negative.  Objective:     Vital Signs (Most Recent):  Temp: 97.8 °F (36.6 °C) (03/20/23 1927)  Pulse: 73 (03/20/23 1957)  Resp: 18 (03/20/23 1957)  BP: 133/87 (03/20/23 1927)  SpO2: 95 % (03/20/23 1957) Vital Signs (24h Range):  Temp:  [97.2 °F (36.2 °C)-98.8 °F (37.1 °C)] 97.8 °F (36.6 °C)  Pulse:  [67-84] 73  Resp:  [17-18] 18  SpO2:  [95 %-99 %] 95 %  BP: (129-182)/(60-87) 133/87     Weight: 111.4 kg (245 lb 9.5 oz)  Body mass index is 36.27 kg/m².    Physical Exam  Constitutional:       General: He is not in acute distress.     Appearance: He is well-developed. He is ill-appearing.   HENT:      Head: Contusion (forehead) and  laceration (right side of nose) present.   Eyes:      Conjunctiva/sclera: Conjunctivae normal.      Pupils: Pupils are equal, round, and reactive to light.   Cardiovascular:      Rate and Rhythm: Normal rate and regular rhythm.      Pulses: Normal pulses.      Heart sounds: Murmur heard.   Pulmonary:      Effort: Pulmonary effort is normal.      Breath sounds: Normal breath sounds.   Abdominal:      General: Bowel sounds are normal.      Palpations: Abdomen is soft.   Musculoskeletal:         General: Tenderness (right shoulder) present.      Cervical back: Normal range of motion and neck supple.      Right lower leg: Edema present.      Left lower leg: Edema present.      Comments: Decreased ROM R shoulder   Skin:     General: Skin is warm and dry.      Findings: Bruising and rash (chronic skin changes to BLE) present.      Comments: Diffuse ecchymosis face and neck   Neurological:      Mental Status: He is alert and oriented to person, place, and time.   Psychiatric:         Mood and Affect: Mood is depressed.         Behavior: Behavior normal.         CRANIAL NERVES     CN III, IV, VI   Pupils are equal, round, and reactive to light.     Significant Labs: All pertinent labs within the past 24 hours have been reviewed.  CBC:   Recent Labs   Lab 03/20/23  1418   WBC 6.51   HGB 9.2*   HCT 27.4*        CMP:   Recent Labs   Lab 03/20/23  1418      K 4.6   *   CO2 20*   *   BUN 32*   CREATININE 1.4   CALCIUM 8.7   PROT 5.6*   ALBUMIN 2.4*   BILITOT 0.5   ALKPHOS 91   AST 46*   ALT 20   ANIONGAP 5*       Significant Imaging: I have reviewed all pertinent imaging results/findings within the past 24 hours.    Assessment/Plan:     * Pneumonia  IV azithromycin, IV rocephin  brochodilators prn  Supplemental oxygen as needed   Check procalcitonin  Sputum culture      Warfarin anticoagulation  INR therapeutic; continue chronic warfarin dose and monitor INR daily.      Chronic systolic congestive heart  failure  Patient is identified as having Systolic (HFrEF) heart failure that is Chronic. CHF is currently controlled. Latest ECHO performed and demonstrates- Results for orders placed during the hospital encounter of 10/22/21    Echo    Interpretation Summary  · The left ventricle is mildly enlarged with mild concentric hypertrophy and mildly decreased systolic function.  · The estimated ejection fraction is 40%.  · Grade I left ventricular diastolic dysfunction.  · There are segmental left ventricular wall motion abnormalities. There is mid-inferior dyskinesis  · Mild tricuspid regurgitation.  · Severe left atrial enlargement.  · Mild right ventricular enlargement with normal right ventricular systolic function.  . Continue Beta Blocker and monitor clinical status closely. Monitor on telemetry. Patient is off CHF pathway.  Monitor strict Is&Os and daily weights.  Continue to stress to patient importance of self efficacy and  on diet for CHF. Last BNP reviewed- and noted below No results for input(s): BNP, BNPTRIAGEBLO in the last 168 hours..      Anemia due to stage 3 chronic kidney disease  Patient's anemia is currently controlled. S/p 0 units of PRBCs.  Current CBC reviewed-   Lab Results   Component Value Date    HGB 9.2 (L) 03/20/2023    HCT 27.4 (L) 03/20/2023     (H) 03/20/2023     03/20/2023     Monitor serial CBC and transfuse if patient becomes hemodynamically unstable, symptomatic or H/H drops below 7/21.         Generalized weakness  Etiology unclear.  Check CPK.   IVFH-gentle  PT/OT consult      Stasis dermatitis of both legs  chronic      Paroxysmal atrial fibrillation  Patient with Long standing persistent (>12 months) atrial fibrillation which is controlled currently with Beta Blocker. Patient is currently in atrial fibrillation.[id number] Score: 4.  Anticoagulation indicated. Anticoagulation done with warfarin..        CKD (chronic kidney disease) stage 3, GFR 30-59 ml/min,  41  Creatine stable for now. BMP reviewed- noted Estimated Creatinine Clearance: 56 mL/min (based on SCr of 1.4 mg/dL). according to latest data. Monitor UOP and serial BMP and adjust therapy as needed. Renally dose meds.          GERD (gastroesophageal reflux disease)  Chronic; continue home pepcid      Hypertension associated with diabetes  Chronic, controlled.  Latest blood pressure and vitals reviewed-   Temp:  [97.2 °F (36.2 °C)-98.8 °F (37.1 °C)]   Pulse:  [67-84]   Resp:  [17-18]   BP: (129-182)/(60-87)   SpO2:  [95 %-99 %] .   Home meds for hypertension were reviewed and noted below.   Hypertension Medications             carvediloL (COREG) 25 MG tablet Take 1 tablet (25 mg total) by mouth 2 (two) times daily with meals.    lisinopriL (PRINIVIL,ZESTRIL) 40 MG tablet Take 1 tablet (40 mg total) by mouth every evening.    nitroGLYCERIN 0.4 MG/DOSE TL SPRY (NITROLINGUAL) 400 mcg/spray spray PLACE 1 SPRAY UNDER THE TONGUE EVERY 5 MINUTES AS NEEDED FOR CHEST PAIN          While in the hospital, will manage blood pressure as follows; Adjust home antihypertensive regimen as follows- continue chronic beta blocker; will hold ACE for now due to recent EMMY.  Monitor creatinine and resume lisinopril when proven stable.    Will utilize p.r.n. blood pressure medication only if patient's blood pressure greater than  180/110 and he develops symptoms such as worsening chest pain or shortness of breath.      Diabetes mellitus with chronic kidney disease, stage III  Not on chronic meds since bariatric surgery.  Monitor daily glucose.      VTE Risk Mitigation (From admission, onward)         Ordered     warfarin (COUMADIN) tablet 2.5 mg  Daily         03/20/23 1929     IP VTE HIGH RISK PATIENT  Once         03/20/23 1929     Place sequential compression device  Until discontinued         03/20/23 1929     Reason for No Pharmacological VTE Prophylaxis  Once        Question:  Reasons:  Answer:  Already adequately anticoagulated  on oral Anticoagulants    03/20/23 1929                     On 03/20/2023, patient should be placed in hospital observation services under my care in collaboration with Dr. Jackson.      Fern Mix, RICK  Department of Hospital Medicine  [facility]

## 2023-03-21 NOTE — ASSESSMENT & PLAN NOTE
Patient's anemia is currently controlled. S/p 0 units of PRBCs.  Current CBC reviewed-   Lab Results   Component Value Date    HGB 9.2 (L) 03/20/2023    HCT 27.4 (L) 03/20/2023     (H) 03/20/2023     03/20/2023     Monitor serial CBC and transfuse if patient becomes hemodynamically unstable, symptomatic or H/H drops below 7/21.

## 2023-03-21 NOTE — CARE UPDATE
03/20/23 1957   Patient Assessment/Suction   Level of Consciousness (AVPU) alert   Respiratory Effort Unlabored   PRE-TX-O2   Device (Oxygen Therapy) room air   SpO2 95 %   Pulse Oximetry Type Intermittent   $ Pulse Oximetry - Multiple Charge Pulse Oximetry - Multiple   Pulse 73   Resp 18

## 2023-03-21 NOTE — ASSESSMENT & PLAN NOTE
Creatine stable for now. BMP reviewed- noted Estimated Creatinine Clearance: 56 mL/min (based on SCr of 1.4 mg/dL). according to latest data. Monitor UOP and serial BMP and adjust therapy as needed. Renally dose meds.

## 2023-03-21 NOTE — SUBJECTIVE & OBJECTIVE
Past Medical History:   Diagnosis Date    Anemia     Anemia of other chronic disease 09/13/2017    Anemia, chronic renal failure 09/13/2017    Anemia, unspecified 09/13/2017    Anticoagulant long-term use     Aorta aneurysm     Arthritis     Atrial fibrillation     Bacteremia due to Streptococcus 01/08/2022    CAD (coronary artery disease)     CHF (congestive heart failure)     Chronic kidney disease     Clotting disorder 09/13/2017    Colon polyp     Dementia     Diabetes mellitus     not on meds    Diabetes mellitus type II     Diverticulosis     Elevated PSA     Encounter for blood transfusion     Former smoker     General anesthetics causing adverse effect in therapeutic use     TROUBLE COMING OUT OF ANESTHESIA WITH GASTRIC BYPASS    GERD (gastroesophageal reflux disease)     Gout     Hx of colonic polyps     Hypercoagulable state     Hyperlipidemia     Hypertension     MI, old 2010    MTHFR mutation     Myocardial infarction     9/2010    Osteomyelitis of ankle or foot 03/09/2017    Prostate cancer 06/2016    Sleep apnea     Squamous cell carcinoma 01/23/2018    Left helix, imiquimod    Venous stasis     Chronic       Past Surgical History:   Procedure Laterality Date    ABDOMINAL SURGERY      CARDIAC SURGERY      3 STENTS     CAUDAL EPIDURAL STEROID INJECTION N/A 6/22/2020    Procedure: INJECTION, STEROID, SPINE, EPIDURAL, CAUDAL;  Surgeon: Evangelist Bose MD;  Location: [facility] OR;  Service: Pain Management;  Laterality: N/A;    COLONOSCOPY  02/2011    diverticulosis with diverticula with clot, no active bleeding    COLONOSCOPY N/A 8/24/2016    Procedure: COLONOSCOPY;  Surgeon: Saroj Borjas MD;  Location: [facility];  Service: Endoscopy;  Laterality: N/A;    COLONOSCOPY N/A 11/18/2020    Procedure: COLONOSCOPY;  Surgeon: Saroj Borjas MD;  Location: [facility];  Service: Endoscopy;  Laterality: N/A;    COLONOSCOPY N/A 10/27/2021    Procedure: COLONOSCOPY;  Surgeon: Saroj Borjas MD;  Location: [facility]  ENDO;  Service: Endoscopy;  Laterality: N/A;    EPIDURAL STEROID INJECTION INTO LUMBAR SPINE N/A 6/22/2020    Procedure: Injection-steroid-epidural-lumbar;  Surgeon: Evangelist Bose MD;  Location: [facility] OR;  Service: Pain Management;  Laterality: N/A;  L3 L4 L5 S1    EPIDURAL STEROID INJECTION INTO LUMBAR SPINE N/A 9/21/2020    Procedure: Injection-steroid-epidural-lumbar;  Surgeon: Evangelist Bose MD;  Location: [facility] OR;  Service: Pain Management;  Laterality: N/A;  L5-S1    GASTRIC BYPASS  2/5/2008    IVC FILTER RETRIEVAL      JOINT REPLACEMENT  1996 and 2001    bi-lat hip replacement/Rt Hip and Lt Hip    RADIOFREQUENCY ABLATION OF LUMBAR MEDIAL BRANCH NERVE AT SINGLE LEVEL Bilateral 1/10/2020    Procedure: Radiofrequency Ablation, Nerve, Spinal, Lumbar, Medial Branch, 1 Level;  Surgeon: Evangelist Bose MD;  Location: [facility] OR;  Service: Pain Management;  Laterality: Bilateral;  L3,L4,L5    RADIOFREQUENCY ABLATION OF LUMBAR MEDIAL BRANCH NERVE AT SINGLE LEVEL Bilateral 11/23/2021    Procedure: Radiofrequency Ablation, Nerve, Spinal, Lumbar, Medial Branch, Bilateral L 3,4,5;  Surgeon: Nile Causey MD;  Location: [facility] OR;  Service: Pain Management;  Laterality: Bilateral;    ROTATOR CUFF REPAIR      Rt shoulder    Stents  8/18/2010    x 3    UPPER GASTROINTESTINAL ENDOSCOPY  02/2011       Review of patient's allergies indicates:   Allergen Reactions    Donepezil Other (See Comments)     AMS    Bactroban [mupirocin calcium] Blisters     Causes Blisters    Shellfish containing products Other (See Comments)     Other reaction(s): Gout  OYSTERS       Current Facility-Administered Medications on File Prior to Encounter   Medication    lactated ringers infusion     Current Outpatient Medications on File Prior to Encounter   Medication Sig    aspirin (ECOTRIN) 81 MG EC tablet Take 81 mg by mouth once daily.    allopurinoL (ZYLOPRIM) 100 MG tablet Take 1 tablet (100 mg total) by mouth every evening.    atorvastatin  (LIPITOR) 80 MG tablet Take 1 tablet (80 mg total) by mouth once daily.    calcitRIOL (ROCALTROL) 0.5 MCG Cap 0.75 mcg once daily.     carvediloL (COREG) 25 MG tablet Take 1 tablet (25 mg total) by mouth 2 (two) times daily with meals.    clopidogreL (PLAVIX) 75 mg tablet Take 1 tablet (75 mg total) by mouth once daily.    famotidine (PEPCID) 40 MG tablet Take 1 tablet (40 mg total) by mouth once daily.    ferrous sulfate (FEROSUL) 325 mg (65 mg iron) Tab tablet TAKE 1 TABLET BY MOUTH TWICE DAILY    fluticasone propionate (FLONASE) 50 mcg/actuation nasal spray SHAKE LIQUID AND USE 2 SPRAYS(100 MCG) IN EACH NOSTRIL EVERY DAY    FOLIC ACID/MULTIVIT-MIN/LUTEIN (CENTRUM SILVER ORAL) Take 1 tablet by mouth once daily.    HYDROcodone-acetaminophen (NORCO) 5-325 mg per tablet Take 1 tablet by mouth every 8 (eight) hours as needed for Pain.    LIDOcaine (LIDODERM) 5 % Place 1 patch onto the skin once daily. Remove & Discard patch within 12 hours or as directed by MD    lisinopriL (PRINIVIL,ZESTRIL) 40 MG tablet Take 1 tablet (40 mg total) by mouth every evening.    magnesium oxide (MAG-OX) 400 mg (241.3 mg magnesium) tablet Take 1 tablet (400 mg total) by mouth once daily.    mecobal-levomefolat Ca-B6 phos (L-METHYL-B6-B12) 3-35-2 mg Tab Take 1 tablet by mouth 2 (two) times daily.    mirabegron (MYRBETRIQ) 50 mg Tb24 Take 1 tablet (50 mg total) by mouth once daily.    nitroGLYCERIN 0.4 MG/DOSE TL SPRY (NITROLINGUAL) 400 mcg/spray spray PLACE 1 SPRAY UNDER THE TONGUE EVERY 5 MINUTES AS NEEDED FOR CHEST PAIN    nystatin (MYCOSTATIN) powder Apply topically 2 (two) times daily.    omeprazole (PRILOSEC) 20 MG capsule Take 1 capsule (20 mg total) by mouth once daily.    prothrombin time/INR test metr Misc 1 Stick by Misc.(Non-Drug; Combo Route) route every 30 days.    traZODone (DESYREL) 50 MG tablet Take 1 tablet (50 mg total) by mouth every evening.    vit A/vit C/vit E/zinc/copper (PRESERVISION AREDS ORAL) Take by mouth 2  (two) times a day.     warfarin (COUMADIN) 5 MG tablet 5 mg except on wednesday and saturday Wed and sat 2.5 mgs (Patient taking differently: Take 2.5 mg by mouth Daily. 5 mg except on wednesday and saturday Wed and sat 2.5 mgs)     Family History       Problem Relation (Age of Onset)    Heart attack Mother, Father, Brother    Lupus Daughter    Ulcerative colitis Daughter (35)          Tobacco Use    Smoking status: Former    Smokeless tobacco: Never    Tobacco comments:     45 yrs ago, only smoked 3-4 packs in his entire life as a teenager   Substance and Sexual Activity    Alcohol use: Not Currently     Comment: rare    Drug use: No    Sexual activity: Not Currently     Review of Systems   Constitutional:  Positive for fatigue. Negative for chills and fever.   HENT:  Negative for congestion.    Respiratory:  Negative for chest tightness and shortness of breath.    Cardiovascular:  Negative for chest pain and palpitations.   Gastrointestinal:  Negative for abdominal pain, diarrhea, nausea and vomiting.   Genitourinary:  Negative for dysuria and hematuria.   Musculoskeletal:  Positive for arthralgias, gait problem and myalgias.   Skin:  Positive for color change and wound.   Neurological:  Positive for weakness. Negative for headaches.   Hematological:  Bruises/bleeds easily.   Psychiatric/Behavioral:  Negative for agitation and confusion.    All other systems reviewed and are negative.  Objective:     Vital Signs (Most Recent):  Temp: 97.8 °F (36.6 °C) (03/20/23 1927)  Pulse: 73 (03/20/23 1957)  Resp: 18 (03/20/23 1957)  BP: 133/87 (03/20/23 1927)  SpO2: 95 % (03/20/23 1957) Vital Signs (24h Range):  Temp:  [97.2 °F (36.2 °C)-98.8 °F (37.1 °C)] 97.8 °F (36.6 °C)  Pulse:  [67-84] 73  Resp:  [17-18] 18  SpO2:  [95 %-99 %] 95 %  BP: (129-182)/(60-87) 133/87     Weight: 111.4 kg (245 lb 9.5 oz)  Body mass index is 36.27 kg/m².    Physical Exam  Constitutional:       General: He is not in acute distress.     Appearance:  He is well-developed. He is ill-appearing.   HENT:      Head: Contusion (forehead) and laceration (right side of nose) present.   Eyes:      Conjunctiva/sclera: Conjunctivae normal.      Pupils: Pupils are equal, round, and reactive to light.   Cardiovascular:      Rate and Rhythm: Normal rate and regular rhythm.      Pulses: Normal pulses.      Heart sounds: Murmur heard.   Pulmonary:      Effort: Pulmonary effort is normal.      Breath sounds: Normal breath sounds.   Abdominal:      General: Bowel sounds are normal.      Palpations: Abdomen is soft.   Musculoskeletal:         General: Tenderness (right shoulder) present.      Cervical back: Normal range of motion and neck supple.      Right lower leg: Edema present.      Left lower leg: Edema present.      Comments: Decreased ROM R shoulder   Skin:     General: Skin is warm and dry.      Findings: Bruising and rash (chronic skin changes to BLE) present.      Comments: Diffuse ecchymosis face and neck   Neurological:      Mental Status: He is alert and oriented to person, place, and time.   Psychiatric:         Mood and Affect: Mood is depressed.         Behavior: Behavior normal.         CRANIAL NERVES     CN III, IV, VI   Pupils are equal, round, and reactive to light.     Significant Labs: All pertinent labs within the past 24 hours have been reviewed.  CBC:   Recent Labs   Lab 03/20/23  1418   WBC 6.51   HGB 9.2*   HCT 27.4*        CMP:   Recent Labs   Lab 03/20/23  1418      K 4.6   *   CO2 20*   *   BUN 32*   CREATININE 1.4   CALCIUM 8.7   PROT 5.6*   ALBUMIN 2.4*   BILITOT 0.5   ALKPHOS 91   AST 46*   ALT 20   ANIONGAP 5*       Significant Imaging: I have reviewed all pertinent imaging results/findings within the past 24 hours.

## 2023-03-21 NOTE — CONSULTS
Wound care consulted for wounds present on admit. Patient noted with 12 skin tears to arms and legs. Multiple lacerations to nose and forehead with sutures in place. Moisture associated skin dermatitis to abd folds, bilateral groins and inner buttocks. Urgotul with foam dressings to all skin tear sites twice a week and Triad to all moisture associated skin dermatitis areas every shift. Wound care will follow up throughout his hospital stay.       Face bruising with lacerations that are sutured.    Left hand (fingers skin tears to 3rd, 4th and 5th)    Right lower forearm    Right proximal forearm    Right knee    Right distal lower leg    Right medial lower leg    Left proximal lower leg    Left lower leg    Left lower arm    Left upper arm    scrotum    Right abd fold    Left abd fold    Inner buttocks

## 2023-03-21 NOTE — NURSING
Nurses Note -- 4 Eyes      3/20/2023   9:45 PM      Skin assessed during: Admit      [] No Pressure Injuries Present    []Prevention Measures Documented      [x] Yes- Altered Skin Integrity Present or Discovered   [x] LDA Added if Not in Epic (Describe Wound)   [x] New Altered Skin Integrity was Present on Admit and Documented in LDA   [x] Wound Image Taken    Wound Care Consulted? Yes    Attending Nurse:  Lexie Pastor RN     Second RN/Staff Member:  Nohemy Zhang Rn

## 2023-03-21 NOTE — ASSESSMENT & PLAN NOTE
Patient's anemia is currently controlled. S/p 0 units of PRBCs.  Current CBC reviewed-   Lab Results   Component Value Date    HGB 9.1 (L) 03/21/2023    HCT 26.7 (L) 03/21/2023    MCV 98 03/21/2023     03/21/2023     Monitor serial CBC and transfuse if patient becomes hemodynamically unstable, symptomatic or H/H drops below 7/21.

## 2023-03-21 NOTE — PLAN OF CARE
Goals to be met by: 4/18/23     Patient will increase functional independence with ADLs by performing:    UE Dressing with Modified [place].  LE Dressing with Modified [place].  Grooming while seated with Modified [place].  Toileting from raised toilet with grab bars with Modified [place] for hygiene and clothing management.   Bathing from  shower chair/bench with Minimal Assistance.  Toilet transfer to raised toilet with grab bars with Modified [place].  Increased functional strength to WFL for ADL's/IADL's.

## 2023-03-21 NOTE — CONSULTS
[facility]  Neurology  Consult Note        PATIENT NAME: Richard Bustos  MRN: [id number]  CSN: [id number]      TODAY'S DATE: 03/21/2023  ADMIT DATE: 3/20/2023                            CONSULTING PROVIDER: Enmanuel Palma MD  CONSULT REQUESTED BY: Alona Jackson MD      Reason for consult:  Weakness lower extremities       History obtained from chart review and the patient.    HPI per EMR: Richard Bustos is a 77 y.o. male with a history of anemia, CAD, CHF, CKD, atrial fibrillation on warfarin, DM2, HLD, HTN, and GERD who presented to the ED for further evaluation of BLE weakness which began following a fall 3 days ago.  Pt reports that he was walking down a ramp at home when he slipped due to the ramp being wet.  He fell to his knees, but continued the fall onto his face and head and had positive loss of consciousness.  He was initially seen at [facility] ED where he was transferred to [facility].  He reports an extensive workup with no fractures seen and was ultimately discharged home.  He reports difficulty bearing weight since the fall and feels substantially weaker than his baseline.  He reports that he normally uses a cane for ambulation, but has not been able to ambulate at all since his fall 3 days ago.  Today in the ED, CT head is significant for frontal scalp hematoma with no acute intracranial injury seen; bilateral nasal bone fractures are noted.  CXR is concerning for pneumonia.     Neurology consult:  Patient was seen and examined by me.  He states that he recently had a fall while walking down the ramp when he slipped on something and fell forward.  He fell forward on his knees and hit his face and head and lost consciousness.  He states that he was down for a while before someone could come and help him and he does not know how long he was down.  He was taken to [facility] and then was transferred to [facility].  Upon record review from [facility],  there is no creatinine kinase is test done however on arrival here, his CK was elevated to 213.  At [facility], he had trauma workup done and was discharged home.    Since the fall, patient has been feeling weak in his lower extremities and difficulty to ambulate.  At baseline he ambulates with a cane.  He states that he intermittently has back pain however it has not changed in the recent past.  Denies any radiating pain down his legs.  He denies any bowel bladder incontinence, weakness in his upper extremities, vision changes, dizziness, lightheadedness.        PREVIOUS MEDICAL HISTORY:  Past Medical History:   Diagnosis Date    Anemia     Anemia of other chronic disease 09/13/2017    Anemia, chronic renal failure 09/13/2017    Anemia, unspecified 09/13/2017    Anticoagulant long-term use     Aorta aneurysm     Arthritis     Atrial fibrillation     Bacteremia due to Streptococcus 01/08/2022    CAD (coronary artery disease)     CHF (congestive heart failure)     Chronic kidney disease     Clotting disorder 09/13/2017    Colon polyp     Dementia     Diabetes mellitus     not on meds    Diabetes mellitus type II     Diverticulosis     Elevated PSA     Encounter for blood transfusion     Former smoker     General anesthetics causing adverse effect in therapeutic use     TROUBLE COMING OUT OF ANESTHESIA WITH GASTRIC BYPASS    GERD (gastroesophageal reflux disease)     Gout     Hx of colonic polyps     Hypercoagulable state     Hyperlipidemia     Hypertension     MI, old 2010    MTHFR mutation     Myocardial infarction     9/2010    Osteomyelitis of ankle or foot 03/09/2017    Prostate cancer 06/2016    Sleep apnea     Squamous cell carcinoma 01/23/2018    Left helix, imiquimod    Venous stasis     Chronic     PREVIOUS SURGICAL HISTORY:  Past Surgical History:   Procedure Laterality Date    ABDOMINAL SURGERY      CARDIAC SURGERY      3 STENTS     CAUDAL EPIDURAL STEROID INJECTION N/A 6/22/2020    Procedure: INJECTION, STEROID,  SPINE, EPIDURAL, CAUDAL;  Surgeon: Evangelist Bose MD;  Location: [facility] OR;  Service: Pain Management;  Laterality: N/A;    COLONOSCOPY  02/2011    diverticulosis with diverticula with clot, no active bleeding    COLONOSCOPY N/A 8/24/2016    Procedure: COLONOSCOPY;  Surgeon: Saroj Borjas MD;  Location: [facility] ENDO;  Service: Endoscopy;  Laterality: N/A;    COLONOSCOPY N/A 11/18/2020    Procedure: COLONOSCOPY;  Surgeon: Saroj Borjas MD;  Location: [facility] ENDO;  Service: Endoscopy;  Laterality: N/A;    COLONOSCOPY N/A 10/27/2021    Procedure: COLONOSCOPY;  Surgeon: Saroj Borjas MD;  Location: [facility] ENDO;  Service: Endoscopy;  Laterality: N/A;    EPIDURAL STEROID INJECTION INTO LUMBAR SPINE N/A 6/22/2020    Procedure: Injection-steroid-epidural-lumbar;  Surgeon: Evangelist Bose MD;  Location: [facility] OR;  Service: Pain Management;  Laterality: N/A;  L3 L4 L5 S1    EPIDURAL STEROID INJECTION INTO LUMBAR SPINE N/A 9/21/2020    Procedure: Injection-steroid-epidural-lumbar;  Surgeon: Evangelist Bose MD;  Location: [facility] OR;  Service: Pain Management;  Laterality: N/A;  L5-S1    GASTRIC BYPASS  2/5/2008    IVC FILTER RETRIEVAL      JOINT REPLACEMENT  1996 and 2001    bi-lat hip replacement/Rt Hip and Lt Hip    RADIOFREQUENCY ABLATION OF LUMBAR MEDIAL BRANCH NERVE AT SINGLE LEVEL Bilateral 1/10/2020    Procedure: Radiofrequency Ablation, Nerve, Spinal, Lumbar, Medial Branch, 1 Level;  Surgeon: Evangelist Bose MD;  Location: [facility] OR;  Service: Pain Management;  Laterality: Bilateral;  L3,L4,L5    RADIOFREQUENCY ABLATION OF LUMBAR MEDIAL BRANCH NERVE AT SINGLE LEVEL Bilateral 11/23/2021    Procedure: Radiofrequency Ablation, Nerve, Spinal, Lumbar, Medial Branch, Bilateral L 3,4,5;  Surgeon: Nile Causey MD;  Location: [facility] OR;  Service: Pain Management;  Laterality: Bilateral;    ROTATOR CUFF REPAIR      Rt shoulder    Stents  8/18/2010    x 3    UPPER GASTROINTESTINAL ENDOSCOPY  02/2011     [facility]  HISTORY:  Family History   Problem Relation Age of Onset    Heart attack Mother     Heart attack Father     Heart attack Brother     Ulcerative colitis Daughter 35    Lupus Daughter     Colon cancer Neg Hx     Colon polyps Neg Hx     Crohn's disease Neg Hx     Melanoma Neg Hx     Psoriasis Neg Hx     Eczema Neg Hx      SOCIAL HISTORY:  Social History     Tobacco Use    Smoking status: Former    Smokeless tobacco: Never    Tobacco comments:     45 yrs ago, only smoked 3-4 packs in his entire life as a teenager   Substance Use Topics    Alcohol use: Not Currently     Comment: rare    Drug use: No     ALLERGIES:  Review of patient's allergies indicates:   Allergen Reactions    Donepezil Other (See Comments)     AMS    Bactroban [mupirocin calcium] Blisters     Causes Blisters    Shellfish containing products Other (See Comments)     Other reaction(s): Gout  OYSTERS     HOME MEDICATIONS:  Prior to Admission medications    Medication Sig Start Date End Date Taking? Authorizing Provider   aspirin (ECOTRIN) 81 MG EC tablet Take 81 mg by mouth once daily.   Yes Historical Provider   allopurinoL (ZYLOPRIM) 100 MG tablet Take 1 tablet (100 mg total) by mouth every evening. 1/26/23   Familia Ramirez Jr., MD   atorvastatin (LIPITOR) 80 MG tablet Take 1 tablet (80 mg total) by mouth once daily. 1/26/23   Familia Ramirez Jr., MD   calcitRIOL (ROCALTROL) 0.5 MCG Cap 0.75 mcg once daily.  6/11/18   Historical Provider   carvediloL (COREG) 25 MG tablet Take 1 tablet (25 mg total) by mouth 2 (two) times daily with meals. 1/26/23   Familia Ramirez Jr., MD   clopidogreL (PLAVIX) 75 mg tablet Take 1 tablet (75 mg total) by mouth once daily. 1/26/23   Familia Ramirez Jr., MD   famotidine (PEPCID) 40 MG tablet Take 1 tablet (40 mg total) by mouth once daily. 1/26/23 1/26/24  Familia Ramirez Jr., MD   ferrous sulfate (FEROSUL) 325 mg (65 mg iron) Tab tablet TAKE 1 TABLET BY MOUTH TWICE DAILY 1/26/23   Familia Ramirez Jr., MD    fluticasone propionate (FLONASE) 50 mcg/actuation nasal spray SHAKE LIQUID AND USE 2 SPRAYS(100 MCG) IN EACH NOSTRIL EVERY DAY 8/17/20   TORI Newton   FOLIC ACID/MULTIVIT-MIN/LUTEIN (CENTRUM SILVER ORAL) Take 1 tablet by mouth once daily.    Historical Provider   HYDROcodone-acetaminophen (NORCO) 5-325 mg per tablet Take 1 tablet by mouth every 8 (eight) hours as needed for Pain. 3/5/23 4/3/23  Nile Causey MD   LIDOcaine (LIDODERM) 5 % Place 1 patch onto the skin once daily. Remove & Discard patch within 12 hours or as directed by MD 11/15/22   Cesar Naidu PA-C   lisinopriL (PRINIVIL,ZESTRIL) 40 MG tablet Take 1 tablet (40 mg total) by mouth every evening. 1/26/23   Familia Ramirez Jr., MD   magnesium oxide (MAG-OX) 400 mg (241.3 mg magnesium) tablet Take 1 tablet (400 mg total) by mouth once daily. 1/26/23   Familia Ramirez Jr., MD   mecobal-levomefolat Ca-B6 phos (L-METHYL-B6-B12) 3-35-2 mg Tab Take 1 tablet by mouth 2 (two) times daily. 11/10/22   Familia Ramirez Jr., MD   mirabegron (MYRBETRIQ) 50 mg Tb24 Take 1 tablet (50 mg total) by mouth once daily. 5/30/22 5/30/23  Adeline Moss MD   nitroGLYCERIN 0.4 MG/DOSE TL SPRY (NITROLINGUAL) 400 mcg/spray spray PLACE 1 SPRAY UNDER THE TONGUE EVERY 5 MINUTES AS NEEDED FOR CHEST PAIN 9/21/20   Familia Tate MD   nystatin (MYCOSTATIN) powder Apply topically 2 (two) times daily. 11/7/22   Familia Ramirez Jr., MD   omeprazole (PRILOSEC) 20 MG capsule Take 1 capsule (20 mg total) by mouth once daily. 11/8/22   Familia Ramirez Jr., MD   prothrombin time/INR test metr Misc 1 Stick by Misc.(Non-Drug; Combo Route) route every 30 days. 6/15/21   Familia Ramirez Jr., MD   traZODone (DESYREL) 50 MG tablet Take 1 tablet (50 mg total) by mouth every evening. 1/26/23 1/26/24  Familia Ramirez Jr., MD   vit A/vit C/vit E/zinc/copper (PRESERVISION AREDS ORAL) Take by mouth 2 (two) times a day.     Historical Provider   warfarin (COUMADIN) 5 MG  tablet 5 mg except on wednesday and saturday Wed and sat 2.5 mgs  Patient taking differently: Take 2.5 mg by mouth Daily. 5 mg except on wednesday and saturday Wed and sat 2.5 mgs 1/26/23   Familia Ramirez Jr., MD     CURRENT SCHEDULED MEDICATIONS:   allopurinoL  100 mg Oral QHS    aspirin  81 mg Oral Daily    azithromycin  500 mg Intravenous Q24H    calcitRIOL  0.75 mcg Oral Daily    carvediloL  25 mg Oral BID WM    cefTRIAXone (ROCEPHIN) IVPB  1 g Intravenous Q24H    clopidogreL  75 mg Oral Daily    famotidine  40 mg Oral Daily    ferrous sulfate  1 tablet Oral BID    lisinopriL  40 mg Oral QHS    senna-docusate 8.6-50 mg  1 tablet Oral BID    traZODone  50 mg Oral QHS    warfarin  2.5 mg Oral Daily     CURRENT INFUSIONS:    CURRENT PRN MEDICATIONS:  acetaminophen, albuterol-ipratropium, aluminum-magnesium hydroxide-simethicone, dextrose 10%, dextrose 10%, dextrose, dextrose, fluticasone propionate, glucagon (human recombinant), HYDROcodone-acetaminophen, melatonin, naloxone, ondansetron, simethicone, sodium chloride 0.9%    REVIEW OF SYSTEMS:  Please refer to the HPI for all pertinent positive and negative findings. A comprehensive review of all other systems was negative.       PHYSICAL EXAM:  Patient Vitals for the past 24 hrs:   BP Temp Temp src Pulse Resp SpO2 Height Weight   03/21/23 1148 (!) 141/67 97.7 °F (36.5 °C) Oral 69 18 (!) 94 % -- --   03/21/23 1146 -- -- -- 75 18 95 % -- --   03/21/23 0903 (!) 186/95 -- -- -- -- -- -- --   03/21/23 0818 -- -- -- 81 18 95 % -- --   03/21/23 0814 (!) 186/95 97.7 °F (36.5 °C) Axillary 77 18 97 % -- --   03/21/23 0344 132/66 97.3 °F (36.3 °C) -- 62 18 96 % -- --   03/21/23 0300 132/66 97.2 °F (36.2 °C) -- 62 18 96 % -- --   03/20/23 2301 -- -- -- -- (!) 22 -- -- --   03/20/23 2300 (!) 164/90 97.3 °F (36.3 °C) -- 92 18 97 % -- --   03/20/23 2013 -- -- -- -- -- -- -- 111.2 kg (245 lb 2.4 oz)   03/20/23 1957 -- -- -- 73 18 95 % -- --   03/20/23 1927 133/87 97.8 °F (36.6  "°C) -- 75 18 97 % 5' 9" (1.753 m) 111.4 kg (245 lb 9.5 oz)   03/20/23 1843 (!) 182/81 97.2 °F (36.2 °C) Oral 84 17 98 % -- --   03/20/23 1640 (!) 147/81 -- -- 67 -- 98 % -- --   03/20/23 1547 129/60 -- -- 67 -- 99 % -- --   03/20/23 1342 136/73 98.8 °F (37.1 °C) Oral 76 17 98 % 5' 9" (1.753 m) 113.4 kg (250 lb)       GENERAL APPEARANCE: Alert, well-developed, well-nourished male in no acute distress.  HEENT: Normocephalic.  Significant bruising noted on the face and neck PERRL. Oropharynx unremarkable.  PULM: Normal respiratory effort. No accessory muscle use.  CV: RRR.  ABDOMEN: Soft, nontender.      NEURO:  MENTAL STATUS:  Patient is awake, oriented x3.  Able to follow commands appropriately.  Affect euthymic.    CRANIAL NERVES:  Pupils equal round and reactive to light, face is grossly symmetric, extraocular movements are intact.    MOTOR:  Bulk normal. Tone normal and symmetric throughout.  Abnormal movements absent.  Tremor: none present.  Strength 5/5 in bilateral upper extremities and 4/5 bilateral lower extremities.    REFLEXES:  DTRs 2+ throughout.  Plantar response equivocal bilaterally.  SENSATION:grossly intact throughout.  COORDINATION: normal finger-to-nose.  STATION: not tested.  GAIT: not tested.    Labs:  Recent Labs   Lab 03/17/23 1812 03/20/23  1418 03/21/23  0505    138 139   K 4.7 4.6 4.7   * 113* 113*   CO2 21* 20* 22*   BUN 32* 32* 28*   CREATININE 1.6* 1.4 1.3   * 171* 112*   CALCIUM 8.9 8.7 8.6*   MG  --   --  1.7     Recent Labs   Lab 03/17/23  1812 03/20/23  1418 03/21/23  0505   WBC 7.15 6.51 6.86   HGB 10.0* 9.2* 9.1*   HCT 30.1* 27.4* 26.7*    191 197     Recent Labs   Lab 03/17/23  1812 03/20/23  1418   ALBUMIN 2.7* 2.4*   PROT 5.7* 5.6*   BILITOT 0.6 0.5   ALKPHOS 89 91   ALT 22 20   AST 33 46*     Lab Results   Component Value Date    INR 2.5 (H) 03/21/2023     Lab Results   Component Value Date    TRIG 96 03/10/2021    HDL 49 03/10/2021    CHOLHDL 43.8 " 03/10/2021     Lab Results   Component Value Date    HGBA1C 5.1 01/06/2022     No results found for: PROTEINCSF, GLUCCSF, WBCCSF    Imaging:  I have reviewed and interpreted the pertinent imaging and lab results.      X-ray Shoulder 2 or More Views Right  Narrative: EXAMINATION:  XR SHOULDER COMPLETE 2 OR MORE VIEWS RIGHT    CLINICAL HISTORY:  fall/pain;    TECHNIQUE:  2 or more views of the shoulder are obtained.    COMPARISON:  None.    FINDINGS:  There are degenerative changes with hypertrophy AC joint with both superior and inferior spur formation.  Degenerative changes are noted at the glenohumeral joint.  Fracture or dislocation is not seen.  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  Hypertrophic changes at the AC joint with both superior and inferior spur formation.    Electronically signed by: Jonathan Álvarez MD  Date:    03/21/2023  Time:    09:28         ASSESSMENT & PLAN:    Lower extremity weakness      Plan:  Etiology of lower extremity weakness could be secondary to fall and injury to lower extremities vs rhabdomyolysis.   CT head was performed showed frontal scalp hematoma without intracranial injury.  Patient complains of intermittent lower back pain.  Will get MRI lumbar spine to rule out acute pathology as a cause of his lower extremity weakness  Physical and occupational therapy evaluate and treat  Trend CK  Will follow      Thank you kindly for including us in the care of this patient. Please do not hesitate to contact us with any questions.            Enmanuel Palma MD  Neurology/vascular Neurology  Date of Service: 03/21/2023  12:26 PM        Please note: This note was transcribed using voice recognition software. Because of this technology there are often uinintended grammatical, spelling, and other transcription errors. Please disregard these errors.

## 2023-03-21 NOTE — HPI
Richard Bustos is a 78 y/o M with a past medical history significant for anemia, CAD, CHF, CKD, atrial fibrillation on warfarin, DM2, HLD, HTN, and GERD who presented to the ED for further evaluation of BLE weakness which began following a fall 3 days ago.  Pt reports that he was walking down a ramp at home when he slipped due to the ramp being wet.  He fell to his knees, but continued the fall onto his face and head and had positive loss of consciousness.  He was initially seen at [facility] ED where he was transferred to [facility].  He reports an extensive workup with no fractures seen and was ultimately discharged home.  He reports difficulty bearing weight since the fall and feels substantially weaker than his baseline.  He reports that he normally uses a cane for ambulation, but has not been able to ambulate at all since his fall 3 days ago.  Today in the ED, CT head is significant for frontal scalp hematoma with no acute intracranial injury seen; bilateral nasal bone fractures are noted.  CXR is concerning for pneumonia.  He will be placed in observation under the service of hospital medicine for continued monitoring and treatment.

## 2023-03-21 NOTE — ASSESSMENT & PLAN NOTE
Patient with Long standing persistent (>12 months) atrial fibrillation which is controlled currently with Beta Blocker. Patient is currently in atrial fibrillation.[id number] Score: 4.  Anticoagulation indicated. Anticoagulation done with warfarin..

## 2023-03-21 NOTE — ASSESSMENT & PLAN NOTE
Patient is identified as having Systolic (HFrEF) heart failure that is Chronic. CHF is currently controlled. Latest ECHO performed and demonstrates- Results for orders placed during the hospital encounter of 10/22/21    Echo    Interpretation Summary  · The left ventricle is mildly enlarged with mild concentric hypertrophy and mildly decreased systolic function.  · The estimated ejection fraction is 40%.  · Grade I left ventricular diastolic dysfunction.  · There are segmental left ventricular wall motion abnormalities. There is mid-inferior dyskinesis  · Mild tricuspid regurgitation.  · Severe left atrial enlargement.  · Mild right ventricular enlargement with normal right ventricular systolic function.  . Continue Beta Blocker and monitor clinical status closely. Monitor on telemetry. Patient is off CHF pathway.  Monitor strict Is&Os and daily weights.  Continue to stress to patient importance of self efficacy and  on diet for CHF. Last BNP reviewed- and noted below No results for input(s): BNP, BNPTRIAGEBLO in the last 168 hours..

## 2023-03-21 NOTE — PLAN OF CARE
[facility]  Initial Discharge Assessment       Primary Care Provider: Familia Ramirez Jr, MD    Admission Diagnosis: Pneumonia [J18.9]  Difficulty walking [R26.2]  Generalized weakness [R53.1]    Admission Date: 3/20/2023  Expected Discharge Date: 3/22/2023    Discharge Barriers Identified: None    Payor: [employer]ED MEDICARE / Plan: [employer] SECURE COMPLETE / Product Type: Medicare Advantage /     Extended Emergency Contact Information  Primary Emergency Contact: Citlali Mora  Address: [address] [place] [id number] [facility]  Home Phone: [phone]  Mobile Phone: [phone]  Relation: Daughter  Preferred language: English   needed? No  Secondary Emergency Contact: Richard Bustos jr  Address: [address] [place], [place] [address] [address] [place]  Mobile Phone: [phone]  Relation: Son  Preferred language: English   needed? No    Discharge Plan A: Skilled Nursing Facility  Discharge Plan B: Home Health      [facility] [place] [place] - [address]  [place] [address]  Phone: [phone] Fax: [phone]    Completed DC assessment with pt's daughter over the phone. CM attempted to meet with patient several times but each time either with provider or out for test. Citlali verified information on facesheet as correct. Reports listed address as mailing. Physical address is [address] [place] [address] in [place]. Reports patient lives at listed address with her. PCP is Dr. Ramirez- reports he saw him last in clinic a few months ago. Pharm is [facility] [address] Reports being patient's POA. Denies hh/hd/outpt services. Reports that patient is normally independent but past week has been dependent with activities. DME- hospital bed, wc, rw, cane, bsc, bath bench. Reports normally ambulates with cane- currently cannot ambulate at all. On coumadin-  monitored by coumadin clinic and home coumadin meter. Reports that takes patient home medications as prescribed and able to afford them. Verified insurance on file. Discussed SNF VS HH- would like SNF at [facility] if possible. DC plan is SNF    Initial Assessment (most recent)       Adult Discharge Assessment - 03/21/23 1445          Discharge Assessment    Assessment Type Discharge Planning Assessment     Confirmed/corrected address, phone number and insurance Yes     Confirmed Demographics Correct on Facesheet     Source of Information family     If unable to respond/provide information was family/caregiver contacted? Yes     Contact Name/Number Citlali manuel     Does patient/caregiver understand observation status Yes     Communicated ORACIO with patient/caregiver Yes     Reason For Admission previous fall/pneumonia     People in Home child(monty), adult     Facility Arrived From: ER     Do you expect to return to your current living situation? No     Do you have help at home or someone to help you manage your care at home? Yes     Prior to hospitilization cognitive status: Alert/Oriented     Current cognitive status: Alert/Oriented     Walking or Climbing Stairs ambulation difficulty, requires equipment;ambulation difficulty, assistance 1 person     Dressing/Bathing bathing difficulty, requires equipment;bathing difficulty, assistance 1 person     Equipment Currently Used at Home cane, straight;wheelchair;bedside commode;hospital bed;other (see comments);bath bench;walker, rolling   coumadin meter    Readmission within 30 days? No     Patient currently being followed by outpatient case management? No     Do you currently have service(s) that help you manage your care at home? No     Do you take prescription medications? Yes     Do you have prescription coverage? Yes     Do you have any problems affording any of your prescribed medications? No     Is the patient taking medications as prescribed? yes     Who is  going to help you get home at discharge? daughter     How do you get to doctors appointments? family or friend will provide;car, drives self     Are you on dialysis? No     Do you take coumadin? No     Discharge Plan A Skilled Nursing Facility     Discharge Plan B Home Health     DME Needed Upon Discharge  none     Discharge Plan discussed with: Adult children     Discharge Barriers Identified None

## 2023-03-21 NOTE — PROGRESS NOTES
[facility] Hospital Medicine  Progress Note    Patient Name: Richard Bustos  MRN: [id number]  Patient Class: OP- Observation   Admission Date: 3/20/2023  Length of Stay: 0 days  Attending Physician: Alona Jackson MD  Primary Care Provider: Familia Ramirez Jr, MD        Subjective:     Principal Problem:Pneumonia        HPI:  Richard Bustos is a 76 y/o M with a past medical history significant for anemia, CAD, CHF, CKD, atrial fibrillation on warfarin, DM2, HLD, HTN, and GERD who presented to the ED for further evaluation of BLE weakness which began following a fall 3 days ago.  Pt reports that he was walking down a ramp at home when he slipped due to the ramp being wet.  He fell to his knees, but continued the fall onto his face and head and had positive loss of consciousness.  He was initially seen at [facility] ED where he was transferred to [facility].  He reports an extensive workup with no fractures seen and was ultimately discharged home.  He reports difficulty bearing weight since the fall and feels substantially weaker than his baseline.  He reports that he normally uses a cane for ambulation, but has not been able to ambulate at all since his fall 3 days ago.  Today in the ED, CT head is significant for frontal scalp hematoma with no acute intracranial injury seen; bilateral nasal bone fractures are noted.  CXR is concerning for pneumonia.  He will be placed in observation under the service of hospital medicine for continued monitoring and treatment.      Overview/Hospital Course:  Richard Bustos was monitored closely during his hospitalization.  He was placed on IV rocephin and IV azithromycin in treatment of pneumonia.  An xray of his right shoulder was performed due to continued c/o pain and decreased ROM following his fall.  PT/OT were consulted.      No new subjective & objective note has been filed under this hospital service since the last note was generated.      Assessment/Plan:      *  Pneumonia  IV azithromycin, IV rocephin  brochodilators prn  Supplemental oxygen as needed   Check procalcitonin-WNL  Sputum culture  Would benefit from IS      Warfarin anticoagulation  INR therapeutic; continue chronic warfarin dose and monitor INR daily.      Chronic systolic congestive heart failure  Patient is identified as having Systolic (HFrEF) heart failure that is Chronic. CHF is currently controlled. Latest ECHO performed and demonstrates- Results for orders placed during the hospital encounter of 10/22/21    Echo    Interpretation Summary  · The left ventricle is mildly enlarged with mild concentric hypertrophy and mildly decreased systolic function.  · The estimated ejection fraction is 40%.  · Grade I left ventricular diastolic dysfunction.  · There are segmental left ventricular wall motion abnormalities. There is mid-inferior dyskinesis  · Mild tricuspid regurgitation.  · Severe left atrial enlargement.  · Mild right ventricular enlargement with normal right ventricular systolic function.  . Continue Beta Blocker and monitor clinical status closely. Monitor on telemetry. Patient is off CHF pathway.  Monitor strict Is&Os and daily weights.  Continue to stress to patient importance of self efficacy and  on diet for CHF. Last BNP reviewed- and noted below No results for input(s): BNP, BNPTRIAGEBLO in the last 168 hours..      Anemia due to stage 3 chronic kidney disease  Patient's anemia is currently controlled. S/p 0 units of PRBCs.  Current CBC reviewed-   Lab Results   Component Value Date    HGB 9.1 (L) 03/21/2023    HCT 26.7 (L) 03/21/2023    MCV 98 03/21/2023     03/21/2023     Monitor serial CBC and transfuse if patient becomes hemodynamically unstable, symptomatic or H/H drops below 7/21.         Generalized weakness  Etiology unclear.  Check CPK-mildly elevated.  Fluids given overnight-trend.     PT/OT consult  Maintain fall precautions  Consult neurology      Stasis dermatitis of  both legs  chronic      Paroxysmal atrial fibrillation  Patient with Long standing persistent (>12 months) atrial fibrillation which is controlled currently with Beta Blocker. Patient is currently in atrial fibrillation.[id number] Score: 4.  Anticoagulation indicated. Anticoagulation done with warfarin..        CKD (chronic kidney disease) stage 3, GFR 30-59 ml/min, 41  Creatine stable for now. BMP reviewed- noted Estimated Creatinine Clearance: 60.3 mL/min (based on SCr of 1.3 mg/dL). according to latest data. Monitor UOP and serial BMP and adjust therapy as needed. Renally dose meds.          GERD (gastroesophageal reflux disease)  Chronic; continue home pepcid      Hypertension associated with diabetes  Chronic, controlled.  Latest blood pressure and vitals reviewed-   Temp:  [97.2 °F (36.2 °C)-98.8 °F (37.1 °C)]   Pulse:  [62-92]   Resp:  [17-22]   BP: (129-186)/(60-95)   SpO2:  [95 %-99 %] .   Home meds for hypertension were reviewed and noted below.   Hypertension Medications               carvediloL (COREG) 25 MG tablet Take 1 tablet (25 mg total) by mouth 2 (two) times daily with meals.    lisinopriL (PRINIVIL,ZESTRIL) 40 MG tablet Take 1 tablet (40 mg total) by mouth every evening.    nitroGLYCERIN 0.4 MG/DOSE TL SPRY (NITROLINGUAL) 400 mcg/spray spray PLACE 1 SPRAY UNDER THE TONGUE EVERY 5 MINUTES AS NEEDED FOR CHEST PAIN            While in the hospital, will manage blood pressure as follows; Adjust home antihypertensive regimen as follows- continue chronic beta blocker; will hold ACE for now due to recent EMMY.  Monitor creatinine and resume lisinopril when proven stable.-resume nightly lisinopril and continue to monitor.    Will utilize p.r.n. blood pressure medication only if patient's blood pressure greater than  180/110 and he develops symptoms such as worsening chest pain or shortness of breath.      Diabetes mellitus with chronic kidney disease, stage III  Not on chronic meds since bariatric  surgery.  Monitor daily glucose.    Closed nondisplaced fracture of sixth cervical vertebra       MRI brain/cervical spine       Consult neurosurgery      VTE Risk Mitigation (From admission, onward)           Ordered     warfarin (COUMADIN) tablet 2.5 mg  Daily         03/20/23 1929     IP VTE HIGH RISK PATIENT  Once         03/20/23 1929     Place sequential compression device  Until discontinued         03/20/23 1929     Reason for No Pharmacological VTE Prophylaxis  Once        Question:  Reasons:  Answer:  Already adequately anticoagulated on oral Anticoagulants    03/20/23 1929                    Discharge Planning   ORACIO:      Code Status: Full Code   Is the patient medically ready for discharge?:     Reason for patient still in hospital (select all that apply): Patient trending condition, PT / OT recommendations and Pending disposition                     RICK Nassar  Department of Hospital Medicine   [facility]

## 2023-03-21 NOTE — ASSESSMENT & PLAN NOTE
IV azithromycin, IV rocephin  brochodilators prn  Supplemental oxygen as needed   Check procalcitonin-WNL  Sputum culture  Would benefit from IS

## 2023-03-21 NOTE — ASSESSMENT & PLAN NOTE
Etiology unclear.  Check CPK-mildly elevated.  Fluids given overnight-trend.     PT/OT consult  Maintain fall precautions

## 2023-03-21 NOTE — PROGRESS NOTES
Full consult to follow     All imaging reviewed   Note patient is on both warfarin and Plavix  Note elevated CK  Note treatment for pneumonia    He would benefit from both cervical and lumbar surgery -  performed in a staged manner    Recommended addressing the cervical pathology initially - posterior cervical decompression/fusion C3-T1 - will discuss with the patient and family    Would tentatively plan on cervical spine procedure Friday 3/24/23 - if medically cleared and anticoagulants/antiplatelets can be reversed    Recommend rigid cervical orthosis when ambulatory    Please call with questions  [phone]

## 2023-03-21 NOTE — PLAN OF CARE
Problem: Physical Therapy  Goal: Physical Therapy Goal  Description: Goals to be met by: 2023     Patient will increase functional independence with mobility by performin. Supine to sit with MInimal Assistance  2. Sit to stand transfer with Moderate Assistance  3. Bed to chair transfer with Maximum Assistance using Rolling Walker  4. Gait  x 10 feet with Maximum Assistance using Rolling Walker.   5. Sitting at edge of bed x20 minutes with Minimal Assistance  3. Lower extremity exercise program x20 reps   Outcome: Ongoing, Progressing   PT eval and treat completed. Pt with facial ecchymosis and nasal fx post fall . Per daughters pt was indep and driving but had progressive weakness since fall with inability to ambulate. Pt requiring mod/max assist to sit EOB. Max assist to stand. Pt with quadriparesis CT C6 fx with severe stenosis at C6-7. SNF appropriate

## 2023-03-21 NOTE — ASSESSMENT & PLAN NOTE
Chronic, controlled.  Latest blood pressure and vitals reviewed-   Temp:  [97.2 °F (36.2 °C)-98.8 °F (37.1 °C)]   Pulse:  [62-92]   Resp:  [17-22]   BP: (129-186)/(60-95)   SpO2:  [95 %-99 %] .   Home meds for hypertension were reviewed and noted below.   Hypertension Medications             carvediloL (COREG) 25 MG tablet Take 1 tablet (25 mg total) by mouth 2 (two) times daily with meals.    lisinopriL (PRINIVIL,ZESTRIL) 40 MG tablet Take 1 tablet (40 mg total) by mouth every evening.    nitroGLYCERIN 0.4 MG/DOSE TL SPRY (NITROLINGUAL) 400 mcg/spray spray PLACE 1 SPRAY UNDER THE TONGUE EVERY 5 MINUTES AS NEEDED FOR CHEST PAIN          While in the hospital, will manage blood pressure as follows; Adjust home antihypertensive regimen as follows- continue chronic beta blocker; will hold ACE for now due to recent EMMY.  Monitor creatinine and resume lisinopril when proven stable.-resume nightly lisinopril and continue to monitor.    Will utilize p.r.n. blood pressure medication only if patient's blood pressure greater than  180/110 and he develops symptoms such as worsening chest pain or shortness of breath.

## 2023-03-21 NOTE — ASSESSMENT & PLAN NOTE
Creatine stable for now. BMP reviewed- noted Estimated Creatinine Clearance: 60.3 mL/min (based on SCr of 1.3 mg/dL). according to latest data. Monitor UOP and serial BMP and adjust therapy as needed. Renally dose meds.

## 2023-03-21 NOTE — ASSESSMENT & PLAN NOTE
Chronic, controlled.  Latest blood pressure and vitals reviewed-   Temp:  [97.2 °F (36.2 °C)-98.8 °F (37.1 °C)]   Pulse:  [67-84]   Resp:  [17-18]   BP: (129-182)/(60-87)   SpO2:  [95 %-99 %] .   Home meds for hypertension were reviewed and noted below.   Hypertension Medications             carvediloL (COREG) 25 MG tablet Take 1 tablet (25 mg total) by mouth 2 (two) times daily with meals.    lisinopriL (PRINIVIL,ZESTRIL) 40 MG tablet Take 1 tablet (40 mg total) by mouth every evening.    nitroGLYCERIN 0.4 MG/DOSE TL SPRY (NITROLINGUAL) 400 mcg/spray spray PLACE 1 SPRAY UNDER THE TONGUE EVERY 5 MINUTES AS NEEDED FOR CHEST PAIN          While in the hospital, will manage blood pressure as follows; Adjust home antihypertensive regimen as follows- continue chronic beta blocker; will hold ACE for now due to recent EMMY.  Monitor creatinine and resume lisinopril when proven stable.    Will utilize p.r.n. blood pressure medication only if patient's blood pressure greater than  180/110 and he develops symptoms such as worsening chest pain or shortness of breath.

## 2023-03-21 NOTE — PLAN OF CARE
Submitted to [facility] for SNF auth  Pt is accepted at [facility]. No beds available at [facility] per Melonie.     LOCET called in, faxed completed PSSR to [facility]. Awaiting [phone]     03/21/23 1512   Post-Acute Status   Post-Acute Authorization Placement   Post-Acute Placement Status Pending payor review/awaiting authorization (if required)   Patient choice form signed by patient/caregiver List from [facility]

## 2023-03-21 NOTE — ASSESSMENT & PLAN NOTE
Etiology unclear.  Check CPK-mildly elevated.  Fluids given overnight-trend; now WNL.     PT/OT consult  Maintain fall precautions  Consult neurology/neurosurgery

## 2023-03-22 DIAGNOSIS — M48.02 CERVICAL STENOSIS OF SPINE: Primary | ICD-10-CM

## 2023-03-22 PROBLEM — G95.20 SPINAL CORD COMPRESSION: Status: ACTIVE | Noted: 2023-03-22

## 2023-03-22 LAB
ANION GAP SERPL CALC-SCNC: 7 MMOL/L (ref 8–16)
APTT BLDCRRT: 31.1 SEC (ref 21–32)
APTT BLDCRRT: 82 SEC (ref 21–32)
BASOPHILS # BLD AUTO: 0.02 K/UL (ref 0–0.2)
BASOPHILS # BLD AUTO: 0.02 K/UL (ref 0–0.2)
BASOPHILS NFR BLD: 0.3 % (ref 0–1.9)
BASOPHILS NFR BLD: 0.3 % (ref 0–1.9)
BSA FOR ECHO PROCEDURE: 2.33 M2
BUN SERPL-MCNC: 27 MG/DL (ref 8–23)
CALCIUM SERPL-MCNC: 8.8 MG/DL (ref 8.7–10.5)
CHLORIDE SERPL-SCNC: 110 MMOL/L (ref 95–110)
CK SERPL-CCNC: 170 U/L (ref 20–200)
CO2 SERPL-SCNC: 23 MMOL/L (ref 23–29)
CREAT SERPL-MCNC: 1.3 MG/DL (ref 0.5–1.4)
DIFFERENTIAL METHOD: ABNORMAL
DIFFERENTIAL METHOD: ABNORMAL
EJECTION FRACTION: 45 %
EOSINOPHIL # BLD AUTO: 0.1 K/UL (ref 0–0.5)
EOSINOPHIL # BLD AUTO: 0.1 K/UL (ref 0–0.5)
EOSINOPHIL NFR BLD: 1.9 % (ref 0–8)
EOSINOPHIL NFR BLD: 2 % (ref 0–8)
ERYTHROCYTE [DISTWIDTH] IN BLOOD BY AUTOMATED COUNT: 14.9 % (ref 11.5–14.5)
ERYTHROCYTE [DISTWIDTH] IN BLOOD BY AUTOMATED COUNT: 14.9 % (ref 11.5–14.5)
EST. GFR  (NO RACE VARIABLE): 57 ML/MIN/1.73 M^2
GLUCOSE SERPL-MCNC: 96 MG/DL (ref 70–110)
HCT VFR BLD AUTO: 24.7 % (ref 40–54)
HCT VFR BLD AUTO: 25.1 % (ref 40–54)
HGB BLD-MCNC: 8.4 G/DL (ref 14–18)
HGB BLD-MCNC: 8.7 G/DL (ref 14–18)
IMM GRANULOCYTES # BLD AUTO: 0.04 K/UL (ref 0–0.04)
IMM GRANULOCYTES # BLD AUTO: 0.05 K/UL (ref 0–0.04)
IMM GRANULOCYTES NFR BLD AUTO: 0.7 % (ref 0–0.5)
IMM GRANULOCYTES NFR BLD AUTO: 0.9 % (ref 0–0.5)
INR PPP: 1.5 (ref 0.8–1.2)
INR PPP: 1.7 (ref 0.8–1.2)
LYMPHOCYTES # BLD AUTO: 1.1 K/UL (ref 1–4.8)
LYMPHOCYTES # BLD AUTO: 1.1 K/UL (ref 1–4.8)
LYMPHOCYTES NFR BLD: 17.6 % (ref 18–48)
LYMPHOCYTES NFR BLD: 19.6 % (ref 18–48)
MAGNESIUM SERPL-MCNC: 1.6 MG/DL (ref 1.6–2.6)
MCH RBC QN AUTO: 33.5 PG (ref 27–31)
MCH RBC QN AUTO: 33.9 PG (ref 27–31)
MCHC RBC AUTO-ENTMCNC: 34 G/DL (ref 32–36)
MCHC RBC AUTO-ENTMCNC: 34.7 G/DL (ref 32–36)
MCV RBC AUTO: 98 FL (ref 82–98)
MCV RBC AUTO: 98 FL (ref 82–98)
MONOCYTES # BLD AUTO: 0.4 K/UL (ref 0.3–1)
MONOCYTES # BLD AUTO: 0.4 K/UL (ref 0.3–1)
MONOCYTES NFR BLD: 7 % (ref 4–15)
MONOCYTES NFR BLD: 7.3 % (ref 4–15)
NEUTROPHILS # BLD AUTO: 4.1 K/UL (ref 1.8–7.7)
NEUTROPHILS # BLD AUTO: 4.4 K/UL (ref 1.8–7.7)
NEUTROPHILS NFR BLD: 70.3 % (ref 38–73)
NEUTROPHILS NFR BLD: 72.1 % (ref 38–73)
NRBC BLD-RTO: 0 /100 WBC
NRBC BLD-RTO: 0 /100 WBC
PLATELET # BLD AUTO: 187 K/UL (ref 150–450)
PLATELET # BLD AUTO: 195 K/UL (ref 150–450)
PMV BLD AUTO: 10.5 FL (ref 9.2–12.9)
PMV BLD AUTO: 10.6 FL (ref 9.2–12.9)
POTASSIUM SERPL-SCNC: 4.3 MMOL/L (ref 3.5–5.1)
PROTHROMBIN TIME: 15.6 SEC (ref 9–12.5)
PROTHROMBIN TIME: 17.7 SEC (ref 9–12.5)
RA PRESSURE: 3 MMHG
RBC # BLD AUTO: 2.51 M/UL (ref 4.6–6.2)
RBC # BLD AUTO: 2.57 M/UL (ref 4.6–6.2)
SODIUM SERPL-SCNC: 140 MMOL/L (ref 136–145)
WBC # BLD AUTO: 5.82 K/UL (ref 3.9–12.7)
WBC # BLD AUTO: 6.03 K/UL (ref 3.9–12.7)

## 2023-03-22 PROCEDURE — 93005 ELECTROCARDIOGRAM TRACING: CPT

## 2023-03-22 PROCEDURE — 99223 1ST HOSP IP/OBS HIGH 75: CPT | Mod: ,,, | Performed by: NURSE PRACTITIONER

## 2023-03-22 PROCEDURE — 99223 PR INITIAL HOSPITAL CARE,LEVL III: ICD-10-PCS | Mod: ,,, | Performed by: FAMILY MEDICINE

## 2023-03-22 PROCEDURE — 85730 THROMBOPLASTIN TIME PARTIAL: CPT | Performed by: NURSE PRACTITIONER

## 2023-03-22 PROCEDURE — 25000003 PHARM REV CODE 250: Performed by: NURSE PRACTITIONER

## 2023-03-22 PROCEDURE — 94799 UNLISTED PULMONARY SVC/PX: CPT

## 2023-03-22 PROCEDURE — C1751 CATH, INF, PER/CENT/MIDLINE: HCPCS

## 2023-03-22 PROCEDURE — 99223 PR INITIAL HOSPITAL CARE,LEVL III: ICD-10-PCS | Mod: ,,, | Performed by: NURSE PRACTITIONER

## 2023-03-22 PROCEDURE — 99900035 HC TECH TIME PER 15 MIN (STAT)

## 2023-03-22 PROCEDURE — 99223 PR INITIAL HOSPITAL CARE,LEVL III: ICD-10-PCS | Mod: ,,, | Performed by: NEUROLOGICAL SURGERY

## 2023-03-22 PROCEDURE — 36415 COLL VENOUS BLD VENIPUNCTURE: CPT | Performed by: NURSE PRACTITIONER

## 2023-03-22 PROCEDURE — 12000002 HC ACUTE/MED SURGE SEMI-PRIVATE ROOM

## 2023-03-22 PROCEDURE — 85610 PROTHROMBIN TIME: CPT | Mod: 91 | Performed by: NURSE PRACTITIONER

## 2023-03-22 PROCEDURE — 36415 COLL VENOUS BLD VENIPUNCTURE: CPT | Performed by: STUDENT IN AN ORGANIZED HEALTH CARE EDUCATION/TRAINING PROGRAM

## 2023-03-22 PROCEDURE — 93010 EKG 12-LEAD: ICD-10-PCS | Mod: ,,, | Performed by: SPECIALIST

## 2023-03-22 PROCEDURE — 36569 INSJ PICC 5 YR+ W/O IMAGING: CPT

## 2023-03-22 PROCEDURE — 97110 THERAPEUTIC EXERCISES: CPT | Mod: CQ

## 2023-03-22 PROCEDURE — 83735 ASSAY OF MAGNESIUM: CPT | Performed by: NURSE PRACTITIONER

## 2023-03-22 PROCEDURE — 63600175 PHARM REV CODE 636 W HCPCS: Performed by: NURSE PRACTITIONER

## 2023-03-22 PROCEDURE — 82550 ASSAY OF CK (CPK): CPT | Performed by: NURSE PRACTITIONER

## 2023-03-22 PROCEDURE — 85025 COMPLETE CBC W/AUTO DIFF WBC: CPT | Performed by: NURSE PRACTITIONER

## 2023-03-22 PROCEDURE — 99223 1ST HOSP IP/OBS HIGH 75: CPT | Mod: ,,, | Performed by: NEUROLOGICAL SURGERY

## 2023-03-22 PROCEDURE — 93010 ELECTROCARDIOGRAM REPORT: CPT | Mod: ,,, | Performed by: SPECIALIST

## 2023-03-22 PROCEDURE — 99223 1ST HOSP IP/OBS HIGH 75: CPT | Mod: ,,, | Performed by: FAMILY MEDICINE

## 2023-03-22 PROCEDURE — 94761 N-INVAS EAR/PLS OXIMETRY MLT: CPT

## 2023-03-22 PROCEDURE — 85730 THROMBOPLASTIN TIME PARTIAL: CPT | Mod: 91 | Performed by: STUDENT IN AN ORGANIZED HEALTH CARE EDUCATION/TRAINING PROGRAM

## 2023-03-22 PROCEDURE — 80048 BASIC METABOLIC PNL TOTAL CA: CPT | Performed by: NURSE PRACTITIONER

## 2023-03-22 RX ORDER — HEPARIN SODIUM,PORCINE/D5W 25000/250
0-40 INTRAVENOUS SOLUTION INTRAVENOUS CONTINUOUS
Status: DISCONTINUED | OUTPATIENT
Start: 2023-03-22 | End: 2023-03-23

## 2023-03-22 RX ORDER — AMIODARONE HYDROCHLORIDE 200 MG/1
200 TABLET ORAL 2 TIMES DAILY
Status: DISCONTINUED | OUTPATIENT
Start: 2023-03-22 | End: 2023-03-28 | Stop reason: HOSPADM

## 2023-03-22 RX ORDER — MAGNESIUM SULFATE HEPTAHYDRATE 40 MG/ML
2 INJECTION, SOLUTION INTRAVENOUS ONCE
Status: COMPLETED | OUTPATIENT
Start: 2023-03-22 | End: 2023-03-22

## 2023-03-22 RX ORDER — MAGNESIUM SULFATE HEPTAHYDRATE 40 MG/ML
2 INJECTION, SOLUTION INTRAVENOUS ONCE
Status: COMPLETED | OUTPATIENT
Start: 2023-03-22 | End: 2023-03-23

## 2023-03-22 RX ORDER — SODIUM CHLORIDE 0.9 % (FLUSH) 0.9 %
10 SYRINGE (ML) INJECTION
Status: DISCONTINUED | OUTPATIENT
Start: 2023-03-22 | End: 2023-03-28 | Stop reason: HOSPADM

## 2023-03-22 RX ORDER — SODIUM CHLORIDE 0.9 % (FLUSH) 0.9 %
10 SYRINGE (ML) INJECTION EVERY 6 HOURS
Status: DISCONTINUED | OUTPATIENT
Start: 2023-03-23 | End: 2023-03-28 | Stop reason: HOSPADM

## 2023-03-22 RX ADMIN — FERROUS SULFATE TAB 325 MG (65 MG ELEMENTAL FE) 1 EACH: 325 (65 FE) TAB at 08:03

## 2023-03-22 RX ADMIN — AZITHROMYCIN MONOHYDRATE 500 MG: 500 INJECTION, POWDER, LYOPHILIZED, FOR SOLUTION INTRAVENOUS at 09:03

## 2023-03-22 RX ADMIN — CEFTRIAXONE 1 G: 1 INJECTION, POWDER, FOR SOLUTION INTRAMUSCULAR; INTRAVENOUS at 09:03

## 2023-03-22 RX ADMIN — MAGNESIUM SULFATE 2 G: 2 INJECTION INTRAVENOUS at 08:03

## 2023-03-22 RX ADMIN — DOCUSATE SODIUM AND SENNOSIDES 1 TABLET: 8.6; 5 TABLET, FILM COATED ORAL at 08:03

## 2023-03-22 RX ADMIN — HEPARIN SODIUM 12 UNITS/KG/HR: 10000 INJECTION, SOLUTION INTRAVENOUS at 04:03

## 2023-03-22 RX ADMIN — CARVEDILOL 25 MG: 25 TABLET, FILM COATED ORAL at 08:03

## 2023-03-22 RX ADMIN — TRAZODONE HYDROCHLORIDE 50 MG: 50 TABLET ORAL at 09:03

## 2023-03-22 RX ADMIN — AMIODARONE HYDROCHLORIDE 200 MG: 200 TABLET ORAL at 09:03

## 2023-03-22 RX ADMIN — CALCITRIOL CAPSULES 0.25 MCG 0.75 MCG: 0.25 CAPSULE ORAL at 08:03

## 2023-03-22 RX ADMIN — FERROUS SULFATE TAB 325 MG (65 MG ELEMENTAL FE) 1 EACH: 325 (65 FE) TAB at 09:03

## 2023-03-22 RX ADMIN — FAMOTIDINE 40 MG: 20 TABLET ORAL at 08:03

## 2023-03-22 RX ADMIN — ALLOPURINOL 100 MG: 100 TABLET ORAL at 09:03

## 2023-03-22 RX ADMIN — CARVEDILOL 25 MG: 25 TABLET, FILM COATED ORAL at 05:03

## 2023-03-22 RX ADMIN — LISINOPRIL 40 MG: 40 TABLET ORAL at 09:03

## 2023-03-22 NOTE — CONSULTS
[facility]   Hematology/Oncology  Inpatient Consult Note          Patient Name: Richard Bustos  MRN: [id number]  Admission Date: 3/20/2023  Hospital Length of Stay: 0 days  Code Status: Full Code   Attending Provider: Bill Gamino MD  Referring Provider: Stevenson  Consulting Provider: Kai Ortiz MD  Primary Care Physician: Familia Ramirez Jr, MD  Principal Problem:Pneumonia        Consults  Subjective:     Chief Complaint: Extremity Weakness (Pt had a fall Friday at his home and was seen at [facility], since being discharged pt reports weakness in both legs.  /)          History Present Illness:  77 y.o. 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 He was last seen in Hem/onc clinic back in Sept 2022. He was previously under the care of Dr Krunal Rodrgiuez with hematology/oncology at [facility] for several years.  He has been on coumadin therapy per direction of Dr Tate with cardiology for his cardiac issues and is monitored by [facility]. I do not direct or manage his coumadinization. He had recent fall at home and was inpatient at [facility] in [place]. After discharge from [facility], he presented to [facility] with weakness in lower extremities. MRI's are on chart of the spine. He has been seen by Dr Kike Kc with neurosurgery and is being considered for surgery at some point in the future. He is laying in bed and is awake and alert. Breathing is adequate with no current CP, SOB, or N/V. Discussed with Esdras ZELAYA with cardiology and Dr Gamino via [employer]. Patient underwent cardiac stress test today at [facility].         Past Medical/Surgical History:  Past Medical History:   Diagnosis Date    Anemia     Anemia of other chronic disease 09/13/2017    Anemia, chronic renal failure 09/13/2017    Anemia, unspecified 09/13/2017    Anticoagulant long-term use     Aorta aneurysm     Arthritis      Atrial fibrillation     Bacteremia due to Streptococcus 01/08/2022    CAD (coronary artery disease)     CHF (congestive heart failure)     Chronic kidney disease     Clotting disorder 09/13/2017    Colon polyp     Dementia     Diabetes mellitus     not on meds    Diabetes mellitus type II     Diverticulosis     Elevated PSA     Encounter for blood transfusion     Former smoker     General anesthetics causing adverse effect in therapeutic use     TROUBLE COMING OUT OF ANESTHESIA WITH GASTRIC BYPASS    GERD (gastroesophageal reflux disease)     Gout     Hx of colonic polyps     Hypercoagulable state     Hyperlipidemia     Hypertension     MI, old 2010    MTHFR mutation     Myocardial infarction     9/2010    Osteomyelitis of ankle or foot 03/09/2017    Prostate cancer 06/2016    Sleep apnea     Squamous cell carcinoma 01/23/2018    Left helix, imiquimod    Venous stasis     Chronic     Past Surgical History:   Procedure Laterality Date    ABDOMINAL SURGERY      CARDIAC SURGERY      3 STENTS     CAUDAL EPIDURAL STEROID INJECTION N/A 6/22/2020    Procedure: INJECTION, STEROID, SPINE, EPIDURAL, CAUDAL;  Surgeon: Evangelist Bose MD;  [facility];  Service: Pain Management;  Laterality: N/A;    COLONOSCOPY  02/2011    diverticulosis with diverticula with clot, no active bleeding    COLONOSCOPY N/A 8/24/2016    Procedure: COLONOSCOPY;  Surgeon: Saroj Borjas MD;  Location: [facility];  Service: Endoscopy;  Laterality: N/A;    COLONOSCOPY N/A 11/18/2020    Procedure: COLONOSCOPY;  Surgeon: Saroj Borjas MD;  Location: [facility];  Service: Endoscopy;  Laterality: N/A;    COLONOSCOPY N/A 10/27/2021    Procedure: COLONOSCOPY;  Surgeon: Saroj Borjas MD;  Location: [facility];  Service: Endoscopy;  Laterality: N/A;    EPIDURAL STEROID INJECTION INTO LUMBAR SPINE N/A 6/22/2020    Procedure: Injection-steroid-epidural-lumbar;  Surgeon: Evangelist Bose MD;  Location: [facility];  Service: Pain Management;   Laterality: N/A;  L3 L4 L5 S1    EPIDURAL STEROID INJECTION INTO LUMBAR SPINE N/A 9/21/2020    Procedure: Injection-steroid-epidural-lumbar;  Surgeon: Evangelist Bose MD;  Location: [facility] OR;  Service: Pain Management;  Laterality: N/A;  L5-S1    GASTRIC BYPASS  2/5/2008    IVC FILTER RETRIEVAL      JOINT REPLACEMENT  1996 and 2001    bi-lat hip replacement/Rt Hip and Lt Hip    RADIOFREQUENCY ABLATION OF LUMBAR MEDIAL BRANCH NERVE AT SINGLE LEVEL Bilateral 1/10/2020    Procedure: Radiofrequency Ablation, Nerve, Spinal, Lumbar, Medial Branch, 1 Level;  Surgeon: Evangelist Bose MD;  Location: [facility] OR;  Service: Pain Management;  Laterality: Bilateral;  L3,L4,L5    RADIOFREQUENCY ABLATION OF LUMBAR MEDIAL BRANCH NERVE AT SINGLE LEVEL Bilateral 11/23/2021    Procedure: Radiofrequency Ablation, Nerve, Spinal, Lumbar, Medial Branch, Bilateral L 3,4,5;  Surgeon: Nile Causey MD;  Location: [facility] OR;  Service: Pain Management;  Laterality: Bilateral;    ROTATOR CUFF REPAIR      Rt shoulder    Stents  8/18/2010    x 3    UPPER GASTROINTESTINAL ENDOSCOPY  02/2011         Allergies:  Review of patient's allergies indicates:   Allergen Reactions    Donepezil Other (See Comments)     AMS    Bactroban [mupirocin calcium] Blisters     Causes Blisters    Shellfish containing products Other (See Comments)     Other reaction(s): Gout  OYSTERS       Social/Family History:  Social History     Socioeconomic History    Marital status:    Tobacco Use    Smoking status: Former    Smokeless tobacco: Never    Tobacco comments:     45 yrs ago, only smoked 3-4 packs in his entire life as a teenager   Substance and Sexual Activity    Alcohol use: Not Currently     Comment: rare    Drug use: No    Sexual activity: Not Currently     Family History   Problem Relation Age of Onset    Heart attack Mother     Heart attack Father     Heart attack Brother     Ulcerative colitis Daughter 35    Lupus Daughter     Colon cancer Neg Hx     Colon  "polyps Neg Hx     Crohn's disease Neg Hx     Melanoma Neg Hx     Psoriasis Neg Hx     Eczema Neg Hx          ROS:    GEN: general malaise; neck/back pain; chronic leg and foot issues  HEENT: normal with no HA's, sore throat, stiff neck, changes in vision  CV: normal with no CP, SOB, PND, THOMPSON or orthopnea  PULM: normal with no SOB, cough, hemoptysis, sputum or pleuritic pain  GI: normal with no abdominal pain, nausea, vomiting, constipation, diarrhea, melanotic stools, BRBPR, or hematemesis  : normal with no hematuria, dysuria  BREAST: normal with no mass, discharge, pain  SKIN: numerous ecchymosis involving neck/face/head; laceration on forehead        Medications:  Continuous Infusions:  Scheduled Meds:   allopurinoL  100 mg Oral QHS    azithromycin  500 mg Intravenous Q24H    calcitRIOL  0.75 mcg Oral Daily    carvediloL  25 mg Oral BID WM    cefTRIAXone (ROCEPHIN) IVPB  1 g Intravenous Q24H    famotidine  40 mg Oral Daily    ferrous sulfate  1 tablet Oral BID    lisinopriL  40 mg Oral QHS    senna-docusate 8.6-50 mg  1 tablet Oral BID    traZODone  50 mg Oral QHS    warfarin  2.5 mg Oral Daily     PRN Meds:acetaminophen, albuterol-ipratropium, aluminum-magnesium hydroxide-simethicone, dextrose 10%, dextrose 10%, dextrose, dextrose, fluticasone propionate, glucagon (human recombinant), HYDROcodone-acetaminophen, melatonin, naloxone, ondansetron, simethicone, sodium chloride 0.9%         Objective:       Physical Exam:    Vitals:  Blood pressure 136/82, pulse 70, temperature 97 °F (36.1 °C), resp. rate 16, height 5' 9" (1.753 m), weight 111.2 kg (245 lb 2.4 oz), SpO2 95 %.    GEN: no apparent distress, comfortable; AAOx3; overweight  HEAD: numerous ecchymosis involving neck/face/head; laceration on forehead; swelling from trauma  EYES: no pallor, no icterus, PERRLA  ENT: OMM, no pharyngeal erythema, external ears WNL; no nasal discharge; no thrush  NECK: no masses, thyroid normal, trachea midline, no LAD/LN's, " supple  CV: RRR with no murmur; normal pulse; normal S1 and S2; no pedal edema  CHEST: Normal respiratory effort; CTAB; normal breath sounds; no wheeze or crackles  ABDOM: nontender and nondistended; soft; normal bowel sounds; no rebound/guarding; overweight  MUSC/Skeletal: ROM normal; no crepitus; joints normal; no deformities or arthropathy; uses cane/walker to walk  EXTREM: erythematous changes on bilateral lower extremities;feet are better per patient; left foot in boot    SKIN: no rashes, petechiae or subcutaneous nodules; prior skin cancer on face s/p cream (resolved); chronic skin changes; numerous ecchymosis  : no mcdaniels  NEURO: grossly intact; motor/sensory WNL; AAOx3; no tremors; numerous ecchymosis involving neck/face/head; laceration on forehead  PSYCH: normal mood, affect and behavior  LYMPH: normal cervical, supraclavicular, axillary and groin LN's            Lab Review:   Lab Results   Component Value Date    WBC 5.82 03/22/2023    HGB 8.4 (L) 03/22/2023    HCT 24.7 (L) 03/22/2023    MCV 98 03/22/2023     03/22/2023            Lab Results   Component Value Date    UIBC 402 (H) 04/08/2011    IRON 83 03/14/2022    TRANSFERRIN 111 (L) 03/14/2022    TIBC 164 (L) 03/14/2022    LABIRON 38 02/11/2021    FESATURATED 51 (H) 03/14/2022      CMP  Sodium   Date Value Ref Range Status   03/22/2023 140 136 - 145 mmol/L Final     Potassium   Date Value Ref Range Status   03/22/2023 4.3 3.5 - 5.1 mmol/L Final     Chloride   Date Value Ref Range Status   03/22/2023 110 95 - 110 mmol/L Final     CO2   Date Value Ref Range Status   03/22/2023 23 23 - 29 mmol/L Final     Glucose   Date Value Ref Range Status   03/22/2023 96 70 - 110 mg/dL Final     BUN   Date Value Ref Range Status   03/22/2023 27 (H) 8 - 23 mg/dL Final     Creatinine   Date Value Ref Range Status   03/22/2023 1.3 0.5 - 1.4 mg/dL Final     Calcium   Date Value Ref Range Status   03/22/2023 8.8 8.7 - 10.5 mg/dL Final     Total Protein   Date Value  Ref Range Status   03/20/2023 5.6 (L) 6.0 - 8.4 g/dL Final     Albumin   Date Value Ref Range Status   03/20/2023 2.4 (L) 3.5 - 5.2 g/dL Final     Total Bilirubin   Date Value Ref Range Status   03/20/2023 0.5 0.1 - 1.0 mg/dL Final     Comment:     For infants and newborns, interpretation of results should be based  on gestational age, weight and in agreement with clinical  observations.    Premature Infant recommended reference ranges:  Up to 24 hours.............<8.0 mg/dL  Up to 48 hours............<12.0 mg/dL  3-5 days..................<15.0 mg/dL  6-29 days.................<15.0 mg/dL       Alkaline Phosphatase   Date Value Ref Range Status   03/20/2023 91 55 - 135 U/L Final     AST   Date Value Ref Range Status   03/20/2023 46 (H) 10 - 40 U/L Final     ALT   Date Value Ref Range Status   03/20/2023 20 10 - 44 U/L Final     Anion Gap   Date Value Ref Range Status   03/22/2023 7 (L) 8 - 16 mmol/L Final     eGFR   Date Value Ref Range Status   03/22/2023 57 (A) >60 mL/min/1.73 m^2 Final           Diagnostic Results:      MRI Cervical Spine Without Contrast [[id number]] (Abnormal) Resulted: 03/21/23 1640   Order Status: Completed Updated: 03/21/23 1642   Narrative:     EXAMINATION:   MRI CERVICAL SPINE WITHOUT CONTRAST     CLINICAL HISTORY:   C6 fx;     TECHNIQUE:   Multiplanar, multisequence MR images of the cervical spine were performed without the administration of contrast.     COMPARISON:   None.     FINDINGS:   Study is moderately degraded by motion artifact.     Alignment: Straightening of the normal cervical lordosis.  Stepwise retrolisthesis of C3 on C4, C4 on C5 and C5 on C6, 1-2 mm and 3 mm retrolisthesis of C6 on C7.  There is 2 mm anterolisthesis of C7 on T1 and T1 on T2.     Vertebral Column: Reactive bone marrow edema within the spinous process and lamina of C6 compatible with known recent fractures, which are better demonstrated on comparison CT from 03/17/2023.  Vertebral body heights otherwise  appear grossly maintained without evidence of a new fracture or diffuse marrow placement process.  Advanced multilevel disc degeneration with moderate to severe disc space narrowing, mixed degenerative endplate change and marginal osteophyte formation at C3-4 through C6-7. Edema within the anterior C6-7 disc space, which may be posttraumatic or degenerative.     Cord: Cervical cord is suboptimally evaluated secondary to motion artifact.  Severe compression of the cervical cord at C6-7 and moderate cord compression at C5-6.  Mild-to-moderate flattening of the ventral aspect of the cord at C3-4 and C4-5.  Question focal increased cord signal at C4-5 through C6-7 which could reflect myelomalacia versus cord edema, noting evaluation due to motion artifact..     Skull base and craniocervical junction: Within normal limits.  No evidence of discrete ligamentous injury.     Degenerative findings:     C2-C3: Mild posterior disc osteophyte complex.  Moderate right and mild left facet arthropathy.  Mild uncovertebral joint spurring.  Mild-to-moderate bilateral neural foraminal stenosis.  Mild spinal canal stenosis.     C3-C4: Retrolisthesis.  Posterior disc osteophyte complex, which effaces the ventral thecal sac and mildly flattens the ventral cord surface.  Moderate bilateral facet arthropathy.  Marked bilateral uncovertebral joint spurring.  Severe left and moderate to severe right neural foraminal stenosis.  Mild-to-moderate spinal canal stenosis.     C4-C5: Retrolisthesis.  Posterior disc osteophyte complex, which effaces the ventral thecal sac and mildly flattens the cervical cord.  Moderate left greater than right facet arthropathy.  Marked bilateral uncovertebral joint spurring.  Severe bilateral neural foraminal stenosis.  Moderate spinal canal stenosis.     C5-C6: Retrolisthesis.  Prominent posterior disc osteophyte complex.  Moderate bilateral facet arthropathy.  Marked bilateral uncovertebral joint spurring.   Severe bilateral neural foraminal stenosis.  Severe spinal canal stenosis with moderate cord compression.   Possible focal disruption of the ligamentum flavum.     C6-C7: Retrolisthesis.  Prominent posterior disc osteophyte complex.  Moderate bilateral facet arthropathy.  Marked bilateral uncovertebral joint spurring.  Ligamentum flavum thickening.  Severe bilateral neural foraminal stenosis.  Severe spinal canal stenosis with complete effacement of the thecal sac and severe cord compression.  Possible focal disruption of the ligamentum flavum.     C7-T1: Anterolisthesis.  Posterior disc osteophyte complex, asymmetric to the right.  Moderate bilateral facet arthropathy.  Marked right and moderate left uncovertebral joint spurring.  Severe right and moderate left neural foraminal stenosis.  Moderate spinal canal stenosis.     Paraspinal muscles & soft tissues: Moderate posttraumatic prevertebral edema extending from C1-T2.  Anterior displacement and possible focal disruption of the anterior longitudinal ligament at C6 and C7.  Moderate edema is present within the interspinous ligaments and posterior paraspinous soft tissues at C5-C7 compatible with prior traumatic injury.     Normal signal voids are present in the vertebral arteries.    Impression:       1. Significant motion limited study.   2. Reactive marrow edema within the posterior C6 vertebral body compatible with known posterior spinous process and lamina fractures, better evaluated on prior CT study.   3. Multilevel spondylolisthesis and advanced spondylosis, as detailed above, most pronounced at C3-4 through C6-7, resulting in moderate to severe neural foraminal and spinal canal stenosis.   4. Severe spinal canal stenosis at C6-7 with complete effacement of the thecal sac and severe cord compression.  Severe canal stenosis cord compression also noted C5-6.  Mild-to-moderate cord flattening at C3-4 and C4-5.  Question abnormal increased T2/STIR hyperintense  signal at C4-5 through C6-7, possibly reflecting myelomalacia versus edema, which could be correlated with acuity of symptoms.  Neurosurgery consultation advised.   5. Possible focal disruption of the anterior longitudinal ligament at C6 and C7.  Mild increased disc signal at C6-7, which may be posttraumatic or degenerative.   6. Possible focal destruction of the ligamentum flavum at C5-6 and C6-7.  Moderate edema within the inter spinous ligaments and posterior soft tissues at C5-C7 compatible with posttraumatic injury.      MRI Lumbar Spine Without Contrast [[id number]] Resulted: 03/21/23 1612   Order Status: Completed Updated: 03/21/23 1615   Narrative:     EXAMINATION:   MRI LUMBAR SPINE WITHOUT CONTRAST     CLINICAL HISTORY:   LE weakness;     TECHNIQUE:   Multiplanar, multisequence MR images were acquired from the thoracolumbar junction to the sacrum without the administration of contrast.     COMPARISON:   Radiographs 11/15/2022, MRI lumbar spine 06/04/2020     FINDINGS:   Alignment: Minimal grade 1 anterolisthesis of L4 on L5, 1-2 mm.  Mild retrolisthesis of L1 on L2, L2 on L3, and L3 on L4, 2-3 mm.     Vertebrae: Multilevel Schmorl's node formation along the inferior endplate of L1 and superior and inferior endplates of L2 and L3.  Mild adjacent marrow edema along the superior endplate of L2 suggesting acute Schmorl's node formation.  No evidence of acute fracture or diffuse marrow replacement process.     Discs: Mild chronic anterior wedge deformity of the T12 vertebral body.  Lumbar vertebral body heights are maintained without evidence of acute fracture or diffuse marrow placement process.  Marked multilevel disc degeneration with moderate to severe intervertebral disc space narrowing, mixed degenerative endplate change and marginal osteophyte formation at T11-12 through L3-4.  Findings are most pronounced at L1-L2 and L2-3.  Mild Modic type 1 endplate changes anteriorly at L1-2.  Minimal increased disc  signal at T11-12 through L1-2, presumably degenerative in nature.     Cord: Slight flattening of the ventral aspect of the cord at T11-12 without definite cord signal abnormality.  Conus terminates at L1.     Degenerative findings:     T12-L1: Mild diffuse disc bulge with osteophytic ridging.  Moderate bilateral facet arthropathy.  No neural foraminal or spinal canal stenosis.     L1-L2: Retrolisthesis.  Diffuse disc bulge with posterior osteophytic ridging.  Moderate bilateral facet arthropathy.  Ligamentum flavum infolding.  Mild bilateral neural foraminal stenosis. Mild spinal canal stenosis.     L2-L3: Retrolisthesis.  Diffuse disc bulge with posterior osteophytic ridging.  Moderate bilateral facet arthropathy.  Ligamentum flavum infolding.  Severe right and moderate to severe left neural foraminal stenosis.  Moderate to severe spinal canal stenosis.     L3-L4: Retrolisthesis.  Diffuse disc bulge with posterior osteophytic ridging and small central disc protrusion.  Moderate bilateral facet arthropathy.  Moderate bilateral neural foraminal stenosis.  Moderate to severe spinal canal stenosis.     L4-L5: Anterolisthesis with uncovering the disc.  Right asymmetric diffuse disc bulge with broad-based right subarticular/foraminal disc protrusion.  Severe bilateral facet arthropathy.  Ligamentum flavum infolding.  Severe right and moderate to severe left neural foraminal stenosis.  Severe spinal canal stenosis with complete effacement of the thecal sac.     L5-S1: Diffuse disc bulge.  Severe bilateral facet arthropathy.  Ligamentum flavum infolding.  Moderate left and mild right neural foraminal stenosis.  There is no spinal canal stenosis.     Paraspinal muscles & soft tissues: Atrophy of the dorsal paraspinal musculature.  Multiple bilateral rounded T2 hyperintense renal lesions again demonstrate, previously characterized as renal cysts on prior ultrasound.    Impression:       1. Marked multilevel degenerative  change of the lumbar spine again demonstrated, as detailed above, mildly progressed in comparison to the prior study from 06/04/2020.   2. Findings again are most pronounced at L4-5 with severe right moderate to severe left neural foraminal narrowing and severe spinal canal stenosis.   3. Moderate to severe spinal canal stenosis also noted at L2-3 and L3-4.  Moderate to severe neural foraminal stenosis at L2-3 and L3-4.      MRI Brain Without Contrast [[id number]] Resulted: 03/21/23 1452   Order Status: Completed Updated: 03/21/23 1454   Narrative:     EXAMINATION:   MRI BRAIN WITHOUT CONTRAST     CLINICAL HISTORY:   weakness/confusion since fall/on coumadin;.     TECHNIQUE:   Multiplanar multisequence MR imaging of the brain was performed without the administration of intravenous contrast.     COMPARISON:   CT head 03/20/2023     FINDINGS:   Prominence of the ventricles and sulci compatible with mild generalized cerebral volume loss.  No hydrocephalus.     Scattered and confluent T2/FLAIR hyperintense signal within the periventricular, deep and subcortical white matter, nonspecific and may reflect sequelae of chronic microvascular ischemic change. Small chronic right cerebellar hemisphere infarct.  Diffusion-weighted images demonstrate no evidence of an acute infarct.   Susceptibility weighted images demonstrate no evidence of acute or chronic hemorrhage. No mass effect or midline shift.     Normal vascular flow voids are preserved.     Bone marrow signal intensity is normal. Mild scattered paranasal sinus mucosal thickening.  The visualized portions of the mastoids are unremarkable.  Orbits are unremarkable.     Large bifrontal scalp hematoma again demonstrated.    Impression:       1. No evidence of an acute intracranial abnormality.   2. Large frontal scalp hematoma again demonstrated.   3. Mild generalized cerebral volume loss and chronic microvascular ischemic changes.      CT Head Without Contrast [[id number]]  Resulted: 03/20/23 1505   Order Status: Completed Updated: 03/20/23 1507   Narrative:     EXAMINATION:   CT HEAD WITHOUT CONTRAST     CLINICAL HISTORY:   Head trauma, minor (Age >= 65y);     TECHNIQUE:   Low dose axial images were obtained through the head.  Coronal and sagittal reformations were also performed. Contrast was not administered.     COMPARISON:   03/17/2023     FINDINGS:   There is a large bifrontal scalp hematoma, without significant change.  There is no intracranial hemorrhage.  No extra-axial collection.  Gray-white demonstration is well maintained the ventricles are nondilated.  There are mild chronic white matter changes.  No mass or midline shift.  There is calcification of the intracranial arteries.  There are bilateral nasal bone fractures.    Impression:       Frontal scalp hematoma.  No acute intracranial injury.     Bilateral nasal bone fractures.        Assessment/Plan:     IMPRESSION:  (1) 77 y.o. 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 He was last seen in Hem/onc clinic back in Sept 2022. He was previously under the care of Dr Krunal Rodriguez with hematology/oncology at [facility] for several years.  He has been on coumadin therapy per direction of Dr Tate with cardiology for his cardiac issues and is monitored by [facility]. I do not direct or manage his coumadinization. He had recent fall at home and was inpatient at [facility] in [place]. After discharge from [facility], he presented to [facility] with weakness in lower extremities. MRI's are on chart of the spine. He has been seen by Dr Kike Kc with neurosurgery and is being considered for surgery at some point in the future.     (2) CAD/atrial fibrillation and CHF - followed by Dr Tate with [facility]  - He has been on coumadin therapy per direction of Dr Tate with cardiology for his cardiac issues and is  monitored by [facility].  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   - He was previously under the care of Dr Krunal Rodriguez with hematology/oncology at [facility] for several years  - He has been on coumadin therapy per direction of Dr Tate with cardiology  for his cardiac issues and is monitored by [facility].  - I do not monitor or direct his coumadinization     (4) CRI stage 3B - followed by Nephrology       (5) Chronic multifactorial anemia with underlying anemia of chronic disorders and chronic renal disease; chronic GIB    (6) Diabetes with Diabetic ulcer/ left toe issues/chronic stasis ulcers/dermatitis - followed by Dr Torrez with podiatry     (7) Prostate issues - followed by Dr Moss    (8) GERD      Active Diagnoses:    Diagnosis Date Noted POA    PRINCIPAL PROBLEM:  Pneumonia [J18.9] 03/20/2023 Yes    Closed nondisplaced fracture of sixth cervical vertebra [S12.501A] 03/21/2023 Yes    Warfarin anticoagulation [Z79.01] 11/17/2020 Not Applicable    Chronic systolic congestive heart failure [I50.22] 10/19/2019 Yes    Anemia due to stage 3 chronic kidney disease [N18.30, D63.1] 09/13/2017 Yes    Generalized weakness [R53.1] 12/06/2016 Yes    Stasis dermatitis of both legs [I87.2] 01/22/2015 Yes    Paroxysmal atrial fibrillation [I48.0] 11/21/2014 Yes    CKD (chronic kidney disease) stage 3, GFR 30-59 ml/min, 41 [N18.30] 01/23/2013 Yes    GERD (gastroesophageal reflux disease) [K21.9] 12/16/2011 Yes    Diabetes mellitus with chronic kidney disease, stage III [E11.22] 12/16/2011 Yes     Chronic    Hypertension associated with diabetes [E11.59, I15.2] 12/16/2011 Yes      Problems Resolved During this Admission:           PLAN:    I do not monitor or direct his coumadinization - He has been  on coumadin therapy per direction of Dr Tate with cardiology for his cardiac issues and is monitored by [facility].  Decision with regard to his coumadin therapy and/or its withholding for any procedures will be deferred to the direction of cardiology  Monitor labs and transfuse as needed  I have no objections to use of lovenox as a bridge for any procedures  Will follow remotely at distance - please call me should you need anything else from hematology           Thank you for your consult.      Kai Ortiz MD  Hematology/Oncology  [facility]

## 2023-03-22 NOTE — CONSULTS
Chief Complaint   Patient presents with    Well Child     5 y/o check up     Mom and maternal grandmother state patient has lack of speech and is very aggressive.       Visit Vitals    Pulse 91    Temp 97.7 °F (36.5 °C) (Axillary)    Ht (!) 2' 10.5\" (0.876 m)    Wt 30 lb 9.6 oz (13.9 kg)    HC 49 cm    SpO2 98%    BMI 18.08 kg/m2 [facility]  Department of Cardiology  Consult Note      PATIENT NAME: Richard Bustos  MRN: [id number]  TODAY'S DATE: 03/22/2023  ADMIT DATE: 3/20/2023                          CONSULT REQUESTED BY: Bill Gamino MD    SUBJECTIVE     PRINCIPAL PROBLEM: Pneumonia      REASON FOR CONSULT:  Surgical Clearance      HPI:    77 year old male patient followed by Dr. Familia Tate. He has a PMH significant for CAD history of  PCI in [facility] in 2015, Anemia, CKD, PAF on coumadin, DM2, HTN, and Dyslipidemia. He tells me he has not had any chest pain or SOB. He fell on Friday after a trip and fall. He went to [facility] ED transferred to [facility] and was discharged home. He normally walks with a cane however since his fall he has been extremely weak. Neuro surgery has been consulted after review of CT/MRI. He would benefit from both cervical and lumbar surgery. We have been consulted to clear for surgery from cardiac standpoint. Stress test done in 2017 showed fixed defect no reversible ischemia.       FROM H AND P      Extremity Weakness       Pt had a fall Friday at his home and [facility], since being discharged pt reports weakness in both legs.              HPI: Richard Bustos is a 78 y/o M with a past medical history significant for anemia, CAD, CHF, CKD, atrial fibrillation on warfarin, DM2, HLD, HTN, and GERD who presented to the ED for further evaluation of BLE weakness which began following a fall 3 days ago.  Pt reports that he was walking down a ramp at home when he slipped due to the ramp being wet.  He fell to his knees, but continued the fall onto his face and head and had positive loss of consciousness.  He was initially seen at [facility] ED where he was transferred to [facility].  He reports an extensive workup with no fractures seen and was ultimately discharged home.  He reports difficulty bearing weight since the fall and feels substantially weaker than his baseline.  He reports that he normally uses a cane for  ambulation, but has not been able to ambulate at all since his fall 3 days ago.  Today in the ED, CT head is significant for frontal scalp hematoma with no acute intracranial injury seen; bilateral nasal bone fractures are noted.  CXR is concerning for pneumonia.  He will be placed in observation under the service of hospital medicine for continued monitoring and treatment.           Review of patient's allergies indicates:   Allergen Reactions    Donepezil Other (See Comments)     AMS    Bactroban [mupirocin calcium] Blisters     Causes Blisters    Shellfish containing products Other (See Comments)     Other reaction(s): Gout  OYSTERS       Past Medical History:   Diagnosis Date    Anemia     Anemia of other chronic disease 09/13/2017    Anemia, chronic renal failure 09/13/2017    Anemia, unspecified 09/13/2017    Anticoagulant long-term use     Aorta aneurysm     Arthritis     Atrial fibrillation     Bacteremia due to Streptococcus 01/08/2022    CAD (coronary artery disease)     CHF (congestive heart failure)     Chronic kidney disease     Clotting disorder 09/13/2017    Colon polyp     Dementia     Diabetes mellitus     not on meds    Diabetes mellitus type II     Diverticulosis     Elevated PSA     Encounter for blood transfusion     Former smoker     General anesthetics causing adverse effect in therapeutic use     TROUBLE COMING OUT OF ANESTHESIA WITH GASTRIC BYPASS    GERD (gastroesophageal reflux disease)     Gout     Hx of colonic polyps     Hypercoagulable state     Hyperlipidemia     Hypertension     MI, old 2010    MTHFR mutation     Myocardial infarction     9/2010    Osteomyelitis of ankle or foot 03/09/2017    Prostate cancer 06/2016    Sleep apnea     Squamous cell carcinoma 01/23/2018    Left helix, imiquimod    Venous stasis     Chronic     Past Surgical History:   Procedure Laterality Date    ABDOMINAL SURGERY      CARDIAC SURGERY      3 STENTS     CAUDAL EPIDURAL STEROID INJECTION N/A 6/22/2020     Procedure: INJECTION, STEROID, SPINE, EPIDURAL, CAUDAL;  Surgeon: Evangelist Bose MD;  Location: [facility] OR;  Service: Pain Management;  Laterality: N/A;    COLONOSCOPY  02/2011    diverticulosis with diverticula with clot, no active bleeding    COLONOSCOPY N/A 8/24/2016    Procedure: COLONOSCOPY;  Surgeon: Saroj Borjas MD;  Location: [facility] ENDO;  Service: Endoscopy;  Laterality: N/A;    COLONOSCOPY N/A 11/18/2020    Procedure: COLONOSCOPY;  Surgeon: Saroj Borjas MD;  Location: [facility] ENDO;  Service: Endoscopy;  Laterality: N/A;    COLONOSCOPY N/A 10/27/2021    Procedure: COLONOSCOPY;  Surgeon: Saroj Borjas MD;  Location: [facility] ENDO;  Service: Endoscopy;  Laterality: N/A;    EPIDURAL STEROID INJECTION INTO LUMBAR SPINE N/A 6/22/2020    Procedure: Injection-steroid-epidural-lumbar;  Surgeon: Evangelist Bose MD;  Location: [facility] OR;  Service: Pain Management;  Laterality: N/A;  L3 L4 L5 S1    EPIDURAL STEROID INJECTION INTO LUMBAR SPINE N/A 9/21/2020    Procedure: Injection-steroid-epidural-lumbar;  Surgeon: Evangelist Bose MD;  Location: [facility] OR;  Service: Pain Management;  Laterality: N/A;  L5-S1    GASTRIC BYPASS  2/5/2008    IVC FILTER RETRIEVAL      JOINT REPLACEMENT  1996 and 2001    bi-lat hip replacement/Rt Hip and Lt Hip    RADIOFREQUENCY ABLATION OF LUMBAR MEDIAL BRANCH NERVE AT SINGLE LEVEL Bilateral 1/10/2020    Procedure: Radiofrequency Ablation, Nerve, Spinal, Lumbar, Medial Branch, 1 Level;  Surgeon: Evangelist Bose MD;  Location: [facility] OR;  Service: Pain Management;  Laterality: Bilateral;  L3,L4,L5    RADIOFREQUENCY ABLATION OF LUMBAR MEDIAL BRANCH NERVE AT SINGLE LEVEL Bilateral 11/23/2021    Procedure: Radiofrequency Ablation, Nerve, Spinal, Lumbar, Medial Branch, Bilateral L 3,4,5;  Surgeon: Nile Causey MD;  Location: [facility] OR;  Service: Pain Management;  Laterality: Bilateral;    ROTATOR CUFF REPAIR      Rt shoulder    Stents  8/18/2010    x 3    UPPER GASTROINTESTINAL ENDOSCOPY   02/2011     Social History     Tobacco Use    Smoking status: Former    Smokeless tobacco: Never    Tobacco comments:     47 yrs ago, only smoked 3-4 packs in his entire life as a teenager   Substance Use Topics    Alcohol use: Not Currently     Comment: rare    Drug use: No        REVIEW OF SYSTEMS  + leg weakness  Denies CP  Denies SOB  Normally gets around well with Cane prior to fall according to patient    OBJECTIVE     VITAL SIGNS (Most Recent)  Temp: 97 °F (36.1 °C) (03/22/23 0736)  Pulse: 70 (03/22/23 0736)  Resp: 16 (03/22/23 0736)  BP: 136/82 (03/22/23 0853)  SpO2: 95 % (03/22/23 0758)    VENTILATION STATUS  Resp: 16 (03/22/23 0736)  SpO2: 95 % (03/22/23 0758)       I & O (Last 24H):  Intake/Output Summary (Last 24 hours) at 3/22/2023 1037  Last data filed at 3/22/2023 0740  Gross per 24 hour   Intake --   Output 200 ml   Net -200 ml       WEIGHTS  Wt Readings from Last 3 Encounters:   03/20/23 2013 111.2 kg (245 lb 2.4 oz)   03/20/23 1927 111.4 kg (245 lb 9.5 oz)   03/20/23 1342 113.4 kg (250 lb)   03/17/23 1651 113.4 kg (250 lb)   03/17/23 2118 113.4 kg (250 lb)       PHYSICAL EXAM  GENERAL: Adult male in no distress. Bruising through out entire face, and neck .  HEENT: Bruising through out  NECK: No JVD. No bruit..   THYROID: Thyroid not enlarged. No nodules present..   CARDIAC: Regular rate and rhythm. S1 is normal.S2 is normal.  CHEST ANATOMY: normal.   LUNGS: Clear to auscultation. Diminished to bases  ABDOMEN: Soft no masses or organomegaly.  No abdomen pulsations or bruits.  Normal bowel sounds. No pulsations and no masses felt, No guarding or rebound.   URINARY: No mcdaniels catheter   EXTREMITIES: Cream and bandages noted  CENTRAL NERVOUS SYSTEM: No focal motor or sensory deficits noted.       HOME MEDICATIONS:  Current Facility-Administered Medications on File Prior to Encounter   Medication Dose Route Frequency Provider Last Rate Last Admin    lactated ringers infusion   Intravenous Once PRN  Evangelist Bose MD         Current Outpatient Medications on File Prior to Encounter   Medication Sig Dispense Refill    aspirin (ECOTRIN) 81 MG EC tablet Take 81 mg by mouth once daily.      allopurinoL (ZYLOPRIM) 100 MG tablet Take 1 tablet (100 mg total) by mouth every evening. 90 tablet 3    atorvastatin (LIPITOR) 80 MG tablet Take 1 tablet (80 mg total) by mouth once daily. 90 tablet 3    calcitRIOL (ROCALTROL) 0.5 MCG Cap 0.75 mcg once daily.   4    carvediloL (COREG) 25 MG tablet Take 1 tablet (25 mg total) by mouth 2 (two) times daily with meals. 180 tablet 3    clopidogreL (PLAVIX) 75 mg tablet Take 1 tablet (75 mg total) by mouth once daily. 90 tablet 3    famotidine (PEPCID) 40 MG tablet Take 1 tablet (40 mg total) by mouth once daily. 90 tablet 3    ferrous sulfate (FEROSUL) 325 mg (65 mg iron) Tab tablet TAKE 1 TABLET BY MOUTH TWICE DAILY 180 tablet 3    fluticasone propionate (FLONASE) 50 mcg/actuation nasal spray SHAKE LIQUID AND USE 2 SPRAYS(100 MCG) IN EACH NOSTRIL EVERY DAY 16 g 5    FOLIC ACID/MULTIVIT-MIN/LUTEIN (CENTRUM SILVER ORAL) Take 1 tablet by mouth once daily.      HYDROcodone-acetaminophen (NORCO) 5-325 mg per tablet Take 1 tablet by mouth every 8 (eight) hours as needed for Pain. 90 tablet 0    LIDOcaine (LIDODERM) 5 % Place 1 patch onto the skin once daily. Remove & Discard patch within 12 hours or as directed by MD 15 patch 0    lisinopriL (PRINIVIL,ZESTRIL) 40 MG tablet Take 1 tablet (40 mg total) by mouth every evening. 90 tablet 3    magnesium oxide (MAG-OX) 400 mg (241.3 mg magnesium) tablet Take 1 tablet (400 mg total) by mouth once daily. 90 tablet 3    mecobal-levomefolat Ca-B6 phos (L-METHYL-B6-B12) 3-35-2 mg Tab Take 1 tablet by mouth 2 (two) times daily. 180 tablet 3    mirabegron (MYRBETRIQ) 50 mg Tb24 Take 1 tablet (50 mg total) by mouth once daily. 90 tablet 3    nitroGLYCERIN 0.4 MG/DOSE TL SPRY (NITROLINGUAL) 400 mcg/spray spray PLACE 1 SPRAY UNDER THE TONGUE EVERY 5  MINUTES AS NEEDED FOR CHEST PAIN 4.9 g 9    nystatin (MYCOSTATIN) powder Apply topically 2 (two) times daily. 60 g 11    omeprazole (PRILOSEC) 20 MG capsule Take 1 capsule (20 mg total) by mouth once daily. 90 capsule 3    prothrombin time/INR test metr Misc 1 Stick by Misc.(Non-Drug; Combo Route) route every 30 days. 1 each 0    traZODone (DESYREL) 50 MG tablet Take 1 tablet (50 mg total) by mouth every evening. 90 tablet 3    vit A/vit C/vit E/zinc/copper (PRESERVISION AREDS ORAL) Take by mouth 2 (two) times a day.       warfarin (COUMADIN) 5 MG tablet 5 mg except on wednesday and saturday Wed and sat 2.5 mgs (Patient taking differently: Take 2.5 mg by mouth Daily. 5 mg except on wednesday and saturday Wed and sat 2.5 mgs) 90 tablet 3       SCHEDULED MEDS:   allopurinoL  100 mg Oral QHS    azithromycin  500 mg Intravenous Q24H    calcitRIOL  0.75 mcg Oral Daily    carvediloL  25 mg Oral BID WM    cefTRIAXone (ROCEPHIN) IVPB  1 g Intravenous Q24H    famotidine  40 mg Oral Daily    ferrous sulfate  1 tablet Oral BID    lisinopriL  40 mg Oral QHS    magnesium sulfate IVPB  2 g Intravenous Once    senna-docusate 8.6-50 mg  1 tablet Oral BID    traZODone  50 mg Oral QHS    warfarin  2.5 mg Oral Daily       CONTINUOUS INFUSIONS:    PRN MEDS:acetaminophen, albuterol-ipratropium, aluminum-magnesium hydroxide-simethicone, dextrose 10%, dextrose 10%, dextrose, dextrose, fluticasone propionate, glucagon (human recombinant), HYDROcodone-acetaminophen, melatonin, naloxone, ondansetron, simethicone, sodium chloride 0.9%    LABS AND DIAGNOSTICS     CBC LAST 3 DAYS  Recent Labs   Lab 03/20/23  1418 03/21/23  0505 03/22/23  0514   WBC 6.51 6.86 5.82   RBC 2.75* 2.73* 2.51*   HGB 9.2* 9.1* 8.4*   HCT 27.4* 26.7* 24.7*   * 98 98   MCH 33.5* 33.3* 33.5*   MCHC 33.6 34.1 34.0   RDW 15.1* 14.8* 14.9*    197 187   MPV 11.0 10.9 10.5   GRAN 73.9*  4.8 70.5  4.8 70.3  4.1   LYMPH 16.3*  1.1 19.2  1.3 19.6  1.1   MONO  7.5  0.5 8.0  0.6 7.0  0.4   BASO 0.01 0.01 0.02   NRBC 0 0 0       COAGULATION LAST 3 DAYS  Recent Labs   Lab 03/20/23  1418 03/21/23  0505 03/22/23  0514   INR 2.7* 2.5* 1.7*       CHEMISTRY LAST 3 DAYS  Recent Labs   Lab 03/17/23  1812 03/20/23  1418 03/21/23  0505 03/22/23  0514    138 139 140   K 4.7 4.6 4.7 4.3   * 113* 113* 110   CO2 21* 20* 22* 23   ANIONGAP 6* 5* 4* 7*   BUN 32* 32* 28* 27*   CREATININE 1.6* 1.4 1.3 1.3   * 171* 112* 96   CALCIUM 8.9 8.7 8.6* 8.8   MG  --   --  1.7 1.6   ALBUMIN 2.7* 2.4*  --   --    PROT 5.7* 5.6*  --   --    ALKPHOS 89 91  --   --    ALT 22 20  --   --    AST 33 46*  --   --    BILITOT 0.6 0.5  --   --        CARDIAC PROFILE LAST 3 DAYS  Recent Labs   Lab 03/20/23  1418 03/22/23  0514   * 170       ENDOCRINE LAST 3 DAYS  Recent Labs   Lab 03/20/23  1418 03/21/23  0505   TSH  --  1.009   PROCAL 0.10  --        LAST ARTERIAL BLOOD GAS  ABG  No results for input(s): PH, PO2, PCO2, HCO3, BE in the last 168 hours.    LAST 7 DAYS MICROBIOLOGY   Microbiology Results (last 7 days)       ** No results found for the last 168 hours. **            MOST RECENT IMAGING  MRI Cervical Spine Without Contrast  Narrative: EXAMINATION:  MRI CERVICAL SPINE WITHOUT CONTRAST    CLINICAL HISTORY:  C6 fx;    TECHNIQUE:  Multiplanar, multisequence MR images of the cervical spine were performed without the administration of contrast.    COMPARISON:  None.    FINDINGS:  Study is moderately degraded by motion artifact.    Alignment: Straightening of the normal cervical lordosis.  Stepwise retrolisthesis of C3 on C4, C4 on C5 and C5 on C6, 1-2 mm and 3 mm retrolisthesis of C6 on C7.  There is 2 mm anterolisthesis of C7 on T1 and T1 on T2.    Vertebral Column: Reactive bone marrow edema within the spinous process and lamina of C6 compatible with known recent fractures, which are better demonstrated on comparison CT from 03/17/2023.  Vertebral body heights otherwise appear  grossly maintained without evidence of a new fracture or diffuse marrow placement process.  Advanced multilevel disc degeneration with moderate to severe disc space narrowing, mixed degenerative endplate change and marginal osteophyte formation at C3-4 through C6-7. Edema within the anterior C6-7 disc space, which may be posttraumatic or degenerative.    Cord: Cervical cord is suboptimally evaluated secondary to motion artifact.  Severe compression of the cervical cord at C6-7 and moderate cord compression at C5-6.  Mild-to-moderate flattening of the ventral aspect of the cord at C3-4 and C4-5.  Question focal increased cord signal at C4-5 through C6-7 which could reflect myelomalacia versus cord edema, noting evaluation due to motion artifact..    Skull base and craniocervical junction: Within normal limits.  No evidence of discrete ligamentous injury.    Degenerative findings:    C2-C3: Mild posterior disc osteophyte complex.  Moderate right and mild left facet arthropathy.  Mild uncovertebral joint spurring.  Mild-to-moderate bilateral neural foraminal stenosis.  Mild spinal canal stenosis.    C3-C4: Retrolisthesis.  Posterior disc osteophyte complex, which effaces the ventral thecal sac and mildly flattens the ventral cord surface.  Moderate bilateral facet arthropathy.  Marked bilateral uncovertebral joint spurring.  Severe left and moderate to severe right neural foraminal stenosis.  Mild-to-moderate spinal canal stenosis.    C4-C5: Retrolisthesis.  Posterior disc osteophyte complex, which effaces the ventral thecal sac and mildly flattens the cervical cord.  Moderate left greater than right facet arthropathy.  Marked bilateral uncovertebral joint spurring.  Severe bilateral neural foraminal stenosis.  Moderate spinal canal stenosis.    C5-C6: Retrolisthesis.  Prominent posterior disc osteophyte complex.  Moderate bilateral facet arthropathy.  Marked bilateral uncovertebral joint spurring.  Severe bilateral  neural foraminal stenosis.  Severe spinal canal stenosis with moderate cord compression.   Possible focal disruption of the ligamentum flavum.    C6-C7: Retrolisthesis.  Prominent posterior disc osteophyte complex.  Moderate bilateral facet arthropathy.  Marked bilateral uncovertebral joint spurring.  Ligamentum flavum thickening.  Severe bilateral neural foraminal stenosis.  Severe spinal canal stenosis with complete effacement of the thecal sac and severe cord compression.  Possible focal disruption of the ligamentum flavum.    C7-T1: Anterolisthesis.  Posterior disc osteophyte complex, asymmetric to the right.  Moderate bilateral facet arthropathy.  Marked right and moderate left uncovertebral joint spurring.  Severe right and moderate left neural foraminal stenosis.  Moderate spinal canal stenosis.    Paraspinal muscles & soft tissues: Moderate posttraumatic prevertebral edema extending from C1-T2.  Anterior displacement and possible focal disruption of the anterior longitudinal ligament at C6 and C7.  Moderate edema is present within the interspinous ligaments and posterior paraspinous soft tissues at C5-C7 compatible with prior traumatic injury.    Normal signal voids are present in the vertebral arteries.  Impression: 1. Significant motion limited study.  2. Reactive marrow edema within the posterior C6 vertebral body compatible with known posterior spinous process and lamina fractures, better evaluated on prior CT study.  3. Multilevel spondylolisthesis and advanced spondylosis, as detailed above, most pronounced at C3-4 through C6-7, resulting in moderate to severe neural foraminal and spinal canal stenosis.  4. Severe spinal canal stenosis at C6-7 with complete effacement of the thecal sac and severe cord compression.  Severe canal stenosis cord compression also noted C5-6.  Mild-to-moderate cord flattening at C3-4 and C4-5.  Question abnormal increased T2/STIR hyperintense signal at C4-5 through C6-7,  possibly reflecting myelomalacia versus edema, which could be correlated with acuity of symptoms.  Neurosurgery consultation advised.  5. Possible focal disruption of the anterior longitudinal ligament at C6 and C7.  Mild increased disc signal at C6-7, which may be posttraumatic or degenerative.  6. Possible focal destruction of the ligamentum flavum at C5-6 and C6-7.  Moderate edema within the inter spinous ligaments and posterior soft tissues at C5-C7 compatible with posttraumatic injury.  This report was flagged in Epic as abnormal.    The significant finding above was relayed by myself  by telephone to RICK Ivan on 03/21/2023 at 15:52.    Electronically signed by: Bill Silva  Date:    03/21/2023  Time:    16:40  MRI Lumbar Spine Without Contrast  Narrative: EXAMINATION:  MRI LUMBAR SPINE WITHOUT CONTRAST    CLINICAL HISTORY:  LE weakness;    TECHNIQUE:  Multiplanar, multisequence MR images were acquired from the thoracolumbar junction to the sacrum without the administration of contrast.    COMPARISON:  Radiographs 11/15/2022, MRI lumbar spine 06/04/2020    FINDINGS:  Alignment: Minimal grade 1 anterolisthesis of L4 on L5, 1-2 mm.  Mild retrolisthesis of L1 on L2, L2 on L3, and L3 on L4, 2-3 mm.    Vertebrae: Multilevel Schmorl's node formation along the inferior endplate of L1 and superior and inferior endplates of L2 and L3.  Mild adjacent marrow edema along the superior endplate of L2 suggesting acute Schmorl's node formation.  No evidence of acute fracture or diffuse marrow replacement process.    Discs: Mild chronic anterior wedge deformity of the T12 vertebral body.  Lumbar vertebral body heights are maintained without evidence of acute fracture or diffuse marrow placement process.  Marked multilevel disc degeneration with moderate to severe intervertebral disc space narrowing, mixed degenerative endplate change and marginal osteophyte formation at T11-12 through L3-4.  Findings are most  pronounced at L1-L2 and L2-3.  Mild Modic type 1 endplate changes anteriorly at L1-2.  Minimal increased disc signal at T11-12 through L1-2, presumably degenerative in nature.    Cord: Slight flattening of the ventral aspect of the cord at T11-12 without definite cord signal abnormality.  Conus terminates at L1.    Degenerative findings:    T12-L1: Mild diffuse disc bulge with osteophytic ridging.  Moderate bilateral facet arthropathy.  No neural foraminal or spinal canal stenosis.    L1-L2: Retrolisthesis.  Diffuse disc bulge with posterior osteophytic ridging.  Moderate bilateral facet arthropathy.  Ligamentum flavum infolding.  Mild bilateral neural foraminal stenosis. Mild spinal canal stenosis.    L2-L3: Retrolisthesis.  Diffuse disc bulge with posterior osteophytic ridging.  Moderate bilateral facet arthropathy.  Ligamentum flavum infolding.  Severe right and moderate to severe left neural foraminal stenosis.  Moderate to severe spinal canal stenosis.    L3-L4: Retrolisthesis.  Diffuse disc bulge with posterior osteophytic ridging and small central disc protrusion.  Moderate bilateral facet arthropathy.  Moderate bilateral neural foraminal stenosis.  Moderate to severe spinal canal stenosis.    L4-L5: Anterolisthesis with uncovering the disc.  Right asymmetric diffuse disc bulge with broad-based right subarticular/foraminal disc protrusion.  Severe bilateral facet arthropathy.  Ligamentum flavum infolding.  Severe right and moderate to severe left neural foraminal stenosis.  Severe spinal canal stenosis with complete effacement of the thecal sac.    L5-S1: Diffuse disc bulge.  Severe bilateral facet arthropathy.  Ligamentum flavum infolding.  Moderate left and mild right neural foraminal stenosis.  There is no spinal canal stenosis.    Paraspinal muscles & soft tissues: Atrophy of the dorsal paraspinal musculature.  Multiple bilateral rounded T2 hyperintense renal lesions again demonstrate, previously  characterized as renal cysts on prior ultrasound.  Impression: 1. Marked multilevel degenerative change of the lumbar spine again demonstrated, as detailed above, mildly progressed in comparison to the prior study from 06/04/2020.  2. Findings again are most pronounced at L4-5 with severe right moderate to severe left neural foraminal narrowing and severe spinal canal stenosis.  3. Moderate to severe spinal canal stenosis also noted at L2-3 and L3-4.  Moderate to severe neural foraminal stenosis at L2-3 and L3-4.    Electronically signed by: Bill Silva  Date:    03/21/2023  Time:    16:12  MRI Brain Without Contrast  Narrative: EXAMINATION:  MRI BRAIN WITHOUT CONTRAST    CLINICAL HISTORY:  weakness/confusion since fall/on coumadin;.    TECHNIQUE:  Multiplanar multisequence MR imaging of the brain was performed without the administration of intravenous contrast.    COMPARISON:  CT head 03/20/2023    FINDINGS:  Prominence of the ventricles and sulci compatible with mild generalized cerebral volume loss.  No hydrocephalus.    Scattered and confluent T2/FLAIR hyperintense signal within the periventricular, deep and subcortical white matter, nonspecific and may reflect sequelae of chronic microvascular ischemic change. Small chronic right cerebellar hemisphere infarct.  Diffusion-weighted images demonstrate no evidence of an acute infarct.   Susceptibility weighted images demonstrate no evidence of acute or chronic hemorrhage. No mass effect or midline shift.    Normal vascular flow voids are preserved.    Bone marrow signal intensity is normal. Mild scattered paranasal sinus mucosal thickening.  The visualized portions of the mastoids are unremarkable.  Orbits are unremarkable.    Large bifrontal scalp hematoma again demonstrated.  Impression: 1. No evidence of an acute intracranial abnormality.  2. Large frontal scalp hematoma again demonstrated.  3. Mild generalized cerebral volume loss and chronic microvascular  ischemic changes.    Electronically signed by: Bill Silva  Date:    03/21/2023  Time:    14:52  X-ray Shoulder 2 or More Views Right  Narrative: EXAMINATION:  XR SHOULDER COMPLETE 2 OR MORE VIEWS RIGHT    CLINICAL HISTORY:  fall/pain;    TECHNIQUE:  2 or more views of the shoulder are obtained.    COMPARISON:  None.    FINDINGS:  There are degenerative changes with hypertrophy AC joint with both superior and inferior spur formation.  Degenerative changes are noted at the glenohumeral joint.  Fracture or dislocation is not seen.  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  Hypertrophic changes at the AC joint with both superior and inferior spur formation.    Electronically signed by: Jonathan Álvarez MD  Date:    03/21/2023  Time:    09:28      ECHOCARDIOGRAM RESULTS (last 5)  Results for orders placed during the hospital encounter of 10/22/21    Echo    Interpretation Summary  · The left ventricle is mildly enlarged with mild concentric hypertrophy and mildly decreased systolic function.  · The estimated ejection fraction is 40%.  · Grade I left ventricular diastolic dysfunction.  · There are segmental left ventricular wall motion abnormalities. There is mid-inferior dyskinesis  · Mild tricuspid regurgitation.  · Severe left atrial enlargement.  · Mild right ventricular enlargement with normal right ventricular systolic function.      Results for orders placed during the hospital encounter of 05/26/20    Echo Color Flow Doppler? Yes    Interpretation Summary  · Mild concentric left ventricular hypertrophy.  · Moderately decreased left ventricular systolic function. The estimated ejection fraction is 40%.  · Grade I (mild) left ventricular diastolic dysfunction consistent with impaired relaxation.  · Local segmental wall motion abnormalities. There is inferobasilar dyskinesis.  · Mild left ventricular enlargement.  · Mild right ventricular enlargement.  · Mild right atrial enlargement.  · Mild aortic  regurgitation.  · Mild tricuspid regurgitation.      CURRENT/PREVIOUS VISIT EKG  Results for orders placed or performed during the hospital encounter of 03/20/23   EKG 12-lead    Collection Time: 03/20/23  2:10 PM    Narrative    Test Reason : R53.1,    Vent. Rate : 065 BPM     Atrial Rate : 064 BPM     P-R Int : 168 ms          QRS Dur : 146 ms      QT Int : 456 ms       P-R-T Axes : 037 -02 -42 degrees     QTc Int : 474 ms    Undetermined rhythm  Right bundle branch block  Abnormal ECG  When compared with ECG of 17-MAR-2023 17:51,  Current undetermined rhythm precludes rhythm comparison, needs review  The axis Shifted right  Nonspecific T wave abnormality, improved in Lateral leads    Referred By: [email or web address]   SELF           Confirmed By:            ASSESSMENT/PLAN:     Active Hospital Problems    Diagnosis    *Pneumonia    Closed nondisplaced fracture of sixth cervical vertebra    Warfarin anticoagulation    Chronic systolic congestive heart failure    Anemia due to stage 3 chronic kidney disease    Generalized weakness    Stasis dermatitis of both legs    Paroxysmal atrial fibrillation     EKG stable  Stable on Coreg, no missed doses of anticoagulation. Continue anticoagulation as described below      CKD (chronic kidney disease) stage 3, GFR 30-59 ml/min, 41    GERD (gastroesophageal reflux disease)    Diabetes mellitus with chronic kidney disease, stage III    Hypertension associated with diabetes       ASSESSMENT & PLAN:       Closed Displaced fracture of C6  L4-5 with severe right moderate to severe left neural foraminal narrowing and severe spinal canal stenosis.  PAF-On coumadin-currently in NSR  CAD H/P RCA PCI IN 2015  H/O Mild LV Dysfunction in the past  DM  CKD  Stasis dermatitis  GERD  Generalized weakness after mechanical fall secondary to #1 and #2  Anemia  Hypomagnesemia      RECOMMENDATIONS:      Replace Mag   Keep mag at least 2.0  Patient is in NSR.  Start amiodarone 200 mg PO BID to maintain  NSR  Patient will need to hold anticoagulation prior to surgery   Would be ok to do a lovenox bridge or continue heparin until plan for surgery  EKG/Troponin  Await ECHO to evaluate wall motion and LVEF.  If no obvious wall motion abnormality patient may have surgery with moderate risks.   If abnormality exist may need to consider myoview to evaluate severity of ischemia.  Will follow  Thank you for the consultation        Adeline Dewitt NP  Department of Cardiology  Date of Service: 03/22/2023

## 2023-03-22 NOTE — CARE UPDATE
03/21/23 1919   Patient Assessment/Suction   Level of Consciousness (AVPU) responds to voice   Respiratory Effort Unlabored   PRE-TX-O2   Device (Oxygen Therapy) room air   SpO2 96 %   Pulse Oximetry Type Intermittent   $ Pulse Oximetry - Multiple Charge Pulse Oximetry - Multiple   Pulse 69   Resp 15   Incentive Spirometer   $ Incentive Spirometer Charges unable to perform   Preset CPAP/BiPAP Settings   Mode Of Delivery other (see comments)  (pt has home cpap at bedside.)

## 2023-03-22 NOTE — PROGRESS NOTES
[facility] Hospital Medicine  Progress Note    Patient Name: Richard Bustos  MRN: [id number]  Patient Class: IP- Inpatient   Admission Date: 3/20/2023  Length of Stay: 0 days  Attending Physician: Bill Gamino MD  Primary Care Provider: Familia Ramirez Jr, MD        Subjective:     Principal Problem:Pneumonia        HPI:  Richard Bustos is a 76 y/o M with a past medical history significant for anemia, CAD, CHF, CKD, atrial fibrillation on warfarin, DM2, HLD, HTN, and GERD who presented to the ED for further evaluation of BLE weakness which began following a fall 3 days ago.  Pt reports that he was walking down a ramp at home when he slipped due to the ramp being wet.  He fell to his knees, but continued the fall onto his face and head and had positive loss of consciousness.  He was initially seen at [facility] ED where he was transferred to [facility].  He reports an extensive workup with no fractures seen and was ultimately discharged home.  He reports difficulty bearing weight since the fall and feels substantially weaker than his baseline.  He reports that he normally uses a cane for ambulation, but has not been able to ambulate at all since his fall 3 days ago.  Today in the ED, CT head is significant for frontal scalp hematoma with no acute intracranial injury seen; bilateral nasal bone fractures are noted.  CXR is concerning for pneumonia.  He will be placed in observation under the service of hospital medicine for continued monitoring and treatment.      Overview/Hospital Course:  Richard Bustos was monitored closely during his hospitalization.  He was placed on IV rocephin and IV azithromycin in treatment of pneumonia.  An xray of his right shoulder was performed due to continued c/o pain and decreased ROM following his fall.  PT/OT were consulted.  An MRI of the brain was obtained with no acute findings.  MRI cervical spine concerning for C6 fractures, neural foraminal and spinal canal stenosis, severe  cansl stenosis cord compression C5-6 and mild to moderate cord flattening at C3-4, and C4-5, and other findings compatible with possible trauma as correlates with given history.  MRI lumbar spine showed multilevel degenerative changes, spinal canal stenosis and neural foraminal stenosis.  It was the recommendation of the neurosurgeon that pt undergo posterior cervical decompression/fusion C3-T1 with lumbar surgery to follow at a later date.  Due to the complexity of his health history, cardiology and hematology were consulted.  ASA and plavix were held.       Interval History: somewhat confused this morning.  ASA and plavix discontinued for now.  Cardiology and hematology consulted for further recommendations in light of pending surgery.  Plan of care discussed with pt's daughter at bedside.      Review of Systems   Constitutional:  Positive for fatigue. Negative for chills and fever.   HENT:  Negative for congestion.    Respiratory:  Negative for chest tightness and shortness of breath.    Cardiovascular:  Negative for chest pain and palpitations.   Gastrointestinal:  Negative for abdominal pain, diarrhea, nausea and vomiting.   Genitourinary:  Negative for dysuria and hematuria.   Musculoskeletal:  Positive for arthralgias, gait problem and myalgias.   Skin:  Positive for color change and wound.   Neurological:  Positive for weakness. Negative for headaches.   Hematological:  Bruises/bleeds easily.   Psychiatric/Behavioral:  Negative for agitation and confusion.    All other systems reviewed and are negative.  Objective:     Vital Signs (Most Recent):  Temp: 97 °F (36.1 °C) (03/22/23 0736)  Pulse: 70 (03/22/23 0736)  Resp: 16 (03/22/23 0736)  BP: 136/82 (03/22/23 0853)  SpO2: 95 % (03/22/23 0758) Vital Signs (24h Range):  Temp:  [97 °F (36.1 °C)-97.8 °F (36.6 °C)] 97 °F (36.1 °C)  Pulse:  [69-78] 70  Resp:  [15-18] 16  SpO2:  [93 %-97 %] 95 %  BP: (134-156)/(67-85) 136/82     Weight: 111.2 kg (245 lb 2.4 oz)  Body  mass index is 36.2 kg/m².    Intake/Output Summary (Last 24 hours) at 3/22/2023 1056  Last data filed at 3/22/2023 0740  Gross per 24 hour   Intake --   Output 200 ml   Net -200 ml      Physical Exam  Constitutional:       General: He is not in acute distress.     Appearance: He is well-developed. He is ill-appearing.   HENT:      Head: Contusion (forehead) and laceration (right side of nose) present.   Eyes:      Conjunctiva/sclera: Conjunctivae normal.      Pupils: Pupils are equal, round, and reactive to light.   Cardiovascular:      Rate and Rhythm: Normal rate and regular rhythm.      Pulses: Normal pulses.      Heart sounds: Murmur heard.   Pulmonary:      Effort: Pulmonary effort is normal.      Breath sounds: Normal breath sounds.   Abdominal:      General: Bowel sounds are normal.      Palpations: Abdomen is soft.   Musculoskeletal:         General: Tenderness (right shoulder) present.      Cervical back: Normal range of motion and neck supple.      Right lower leg: Edema present.      Left lower leg: Edema present.      Comments: Decreased ROM R shoulder   Skin:     General: Skin is warm and dry.      Findings: Bruising and rash (chronic skin changes to BLE) present.      Comments: Diffuse ecchymosis face and neck   Neurological:      General: No focal deficit present.      Mental Status: He is alert. He is disoriented.      Comments: Confused at to place and time   Psychiatric:         Mood and Affect: Mood is depressed.         Behavior: Behavior normal.       Significant Labs: All pertinent labs within the past 24 hours have been reviewed.  CBC:   Recent Labs   Lab 03/20/23  1418 03/21/23  0505 03/22/23  0514   WBC 6.51 6.86 5.82   HGB 9.2* 9.1* 8.4*   HCT 27.4* 26.7* 24.7*    197 187     CMP:   Recent Labs   Lab 03/20/23  1418 03/21/23  0505 03/22/23  0514    139 140   K 4.6 4.7 4.3   * 113* 110   CO2 20* 22* 23   * 112* 96   BUN 32* 28* 27*   CREATININE 1.4 1.3 1.3   CALCIUM  8.7 8.6* 8.8   PROT 5.6*  --   --    ALBUMIN 2.4*  --   --    BILITOT 0.5  --   --    ALKPHOS 91  --   --    AST 46*  --   --    ALT 20  --   --    ANIONGAP 5* 4* 7*     Coagulation:   Recent Labs   Lab 03/22/23  0514   INR 1.7*       Significant Imaging: I have reviewed all pertinent imaging results/findings within the past 24 hours.  MRI brain:   1. No evidence of an acute intracranial abnormality.  2. Large frontal scalp hematoma again demonstrated.  3. Mild generalized cerebral volume loss and chronic microvascular ischemic changes.    MRI cervical spine:    1. Significant motion limited study.  2. Reactive marrow edema within the posterior C6 vertebral body compatible with known posterior spinous process and lamina fractures, better evaluated on prior CT study.  3. Multilevel spondylolisthesis and advanced spondylosis, as detailed above, most pronounced at C3-4 through C6-7, resulting in moderate to severe neural foraminal and spinal canal stenosis.  4. Severe spinal canal stenosis at C6-7 with complete effacement of the thecal sac and severe cord compression.  Severe canal stenosis cord compression also noted C5-6.  Mild-to-moderate cord flattening at C3-4 and C4-5.  Question abnormal increased T2/STIR hyperintense signal at C4-5 through C6-7, possibly reflecting myelomalacia versus edema, which could be correlated with acuity of symptoms.  Neurosurgery consultation advised.  5. Possible focal disruption of the anterior longitudinal ligament at C6 and C7.  Mild increased disc signal at C6-7, which may be posttraumatic or degenerative.  6. Possible focal destruction of the ligamentum flavum at C5-6 and C6-7.  Moderate edema within the inter spinous ligaments and posterior soft tissues at C5-C7 compatible with posttraumatic injury.    MRI lumbar spine:  1. Marked multilevel degenerative change of the lumbar spine again demonstrated, as detailed above, mildly progressed in comparison to the prior study from  06/04/2020.  2. Findings again are most pronounced at L4-5 with severe right moderate to severe left neural foraminal narrowing and severe spinal canal stenosis.  3. Moderate to severe spinal canal stenosis also noted at L2-3 and L3-4.  Moderate to severe neural foraminal stenosis at L2-3 and L3-4.    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  Hypertrophic changes at the AC joint with both superior and inferior spur formation.        Assessment/Plan:      * Pneumonia  IV azithromycin, IV rocephin  brochodilators prn  Supplemental oxygen as needed   Check procalcitonin-WNL  Sputum culture  Would benefit from IS      Spinal cord compression  Neurosurgery consult-recommends posterior cervical decompression/fusion C3-T1.  Cardiology consulted for surgical clearance.      Closed nondisplaced fracture of sixth cervical vertebra  MRI brain/cervical spine/lumbar spine-reviewed  Consult neurosurgery      Warfarin anticoagulation  INR subtherapeutic at 1.7 today; daughter reports that he refused meds last night.  Hematology consulted for recommendations moving forward 2/2 pending surgical procedure.      Chronic systolic congestive heart failure  Patient is identified as having Systolic (HFrEF) heart failure that is Chronic. CHF is currently controlled. Latest ECHO performed and demonstrates- Results for orders placed during the hospital encounter of 10/22/21    Echo    Interpretation Summary  · The left ventricle is mildly enlarged with mild concentric hypertrophy and mildly decreased systolic function.  · The estimated ejection fraction is 40%.  · Grade I left ventricular diastolic dysfunction.  · There are segmental left ventricular wall motion abnormalities. There is mid-inferior dyskinesis  · Mild tricuspid regurgitation.  · Severe left atrial enlargement.  · Mild right ventricular enlargement with normal right ventricular systolic function.  . Continue Beta Blocker and monitor clinical status closely. Monitor  on telemetry. Patient is off CHF pathway.  Monitor strict Is&Os and daily weights.  Continue to stress to patient importance of self efficacy and  on diet for CHF. Last BNP reviewed- and noted below No results for input(s): BNP, BNPTRIAGEBLO in the last 168 hours..      Anemia due to stage 3 chronic kidney disease  Patient's anemia is currently controlled. S/p 0 units of PRBCs.  Current CBC reviewed-   Lab Results   Component Value Date    HGB 8.4 (L) 03/22/2023    HCT 24.7 (L) 03/22/2023    MCV 98 03/22/2023     03/22/2023     Monitor serial CBC and transfuse if patient becomes hemodynamically unstable, symptomatic or H/H drops below 7/21.         Generalized weakness  Etiology unclear.  Check CPK-mildly elevated.  Fluids given overnight-trend; now WNL.     PT/OT consult  Maintain fall precautions  Consult neurology/neurosurgery        Stasis dermatitis of both legs  chronic      Paroxysmal atrial fibrillation  Patient with Long standing persistent (>12 months) atrial fibrillation which is controlled currently with Beta Blocker. Patient is currently in atrial fibrillation.[id number] Score: 4.  Anticoagulation indicated. Anticoagulation done with warfarin..        CKD (chronic kidney disease) stage 3, GFR 30-59 ml/min, 41  Creatine stable for now. BMP reviewed- noted Estimated Creatinine Clearance: 60.3 mL/min (based on SCr of 1.3 mg/dL). according to latest data. Monitor UOP and serial BMP and adjust therapy as needed. Renally dose meds.          GERD (gastroesophageal reflux disease)  Chronic; continue home pepcid      Hypertension associated with diabetes  Chronic, controlled.  Latest blood pressure and vitals reviewed-   Temp:  [97 °F (36.1 °C)-97.8 °F (36.6 °C)]   Pulse:  [65-78]   Resp:  [15-18]   BP: (133-156)/(63-85)   SpO2:  [93 %-97 %] .   Home meds for hypertension were reviewed and noted below.   Hypertension Medications             carvediloL (COREG) 25 MG tablet Take 1 tablet (25 mg  total) by mouth 2 (two) times daily with meals.    lisinopriL (PRINIVIL,ZESTRIL) 40 MG tablet Take 1 tablet (40 mg total) by mouth every evening.    nitroGLYCERIN 0.4 MG/DOSE TL SPRY (NITROLINGUAL) 400 mcg/spray spray PLACE 1 SPRAY UNDER THE TONGUE EVERY 5 MINUTES AS NEEDED FOR CHEST PAIN          While in the hospital, will manage blood pressure as follows; Adjust home antihypertensive regimen as follows- continue chronic beta blocker; will hold ACE for now due to recent EMMY.  Monitor creatinine and resume lisinopril when proven stable.-resume nightly lisinopril and continue to monitor.    Will utilize p.r.n. blood pressure medication only if patient's blood pressure greater than  180/110 and he develops symptoms such as worsening chest pain or shortness of breath.      MTHFR mutation (methylenetetrahydrofolate reductase)  Consult hematology for recommendations regarding anticoagulation-daughter prefers to avoid lovenox.      Diabetes mellitus with chronic kidney disease, stage III  Not on chronic meds since bariatric surgery.  Monitor daily glucose.      VTE Risk Mitigation (From admission, onward)         Ordered     warfarin (COUMADIN) tablet 2.5 mg  Daily         03/20/23 1929     IP VTE HIGH RISK PATIENT  Once         03/20/23 1929     Place sequential compression device  Until discontinued         03/20/23 1929     Reason for No Pharmacological VTE Prophylaxis  Once        Question:  Reasons:  Answer:  Already adequately anticoagulated on oral Anticoagulants    03/20/23 1929                Discharge Planning   ORACIO: 3/22/2023     Code Status: Full Code   Is the patient medically ready for discharge?:     Reason for patient still in hospital (select all that apply): Patient trending condition, Treatment, Consult recommendations, PT / OT recommendations and Pending disposition  Discharge Plan A: Skilled Nursing Facility                  RICK Nassar  Department of Hospital Medicine   [facility]

## 2023-03-22 NOTE — ASSESSMENT & PLAN NOTE
Consult hematology for recommendations regarding anticoagulation-daughter prefers to avoid lovenox.

## 2023-03-22 NOTE — PROGRESS NOTES
[facility]  Progress Note  Date: 2023 9:38 AM            Patient Name: Richard Bustos   MRN: [id number]   : 1945    AGE: 77 y.o.    LOS: 0 days Hospital Day: 3  Admit date: 3/20/2023  1:37 PM            HPI per EMR: Richard Bustos is a 77 y.o. male with a history of anemia, CAD, CHF, CKD, atrial fibrillation on warfarin, DM2, HLD, HTN, and GERD who presented to the ED for further evaluation of BLE weakness which began following a fall 3 days ago.  Pt reports that he was walking down a ramp at home when he slipped due to the ramp being wet.  He fell to his knees, but continued the fall onto his face and head and had positive loss of consciousness.  He was initially seen at [facility] ED where he was transferred to [facility].  He reports an extensive workup with no fractures seen and was ultimately discharged home.  He reports difficulty bearing weight since the fall and feels substantially weaker than his baseline.  He reports that he normally uses a cane for ambulation, but has not been able to ambulate at all since his fall 3 days ago.  Today in the ED, CT head is significant for frontal scalp hematoma with no acute intracranial injury seen; bilateral nasal bone fractures are noted.  CXR is concerning for pneumonia.      Neurology consult:  Patient was seen and examined by me.  He states that he recently had a fall while walking down the ramp when he slipped on something and fell forward.  He fell forward on his knees and hit his face and head and lost consciousness.  He states that he was down for a while before someone could come and help him and he does not know how long he was down.  He was taken to [facility] and then was transferred to [facility].  Upon record review from [facility], there is no creatinine kinase is test done however on arrival here, his CK was elevated to 213.  At [facility], he had trauma workup done and was discharged home.     Since the  fall, patient has been feeling weak in his lower extremities and difficulty to ambulate.  At baseline he ambulates with a cane.  He states that he intermittently has back pain however it has not changed in the recent past.  Denies any radiating pain down his legs.  He denies any bowel bladder incontinence, weakness in his upper extremities, vision changes, dizziness, lightheadedness.    03/22/2023:  Patient was seen examined by me this morning.  As per family at bedside, he had episodes of confusion last night.  This morning, patient is oriented x3 and able to follow commands appropriately.       Vitals:  Patient Vitals for the past 24 hrs:   BP Temp Temp src Pulse Resp SpO2   03/22/23 0853 136/82 -- -- -- -- --   03/22/23 0758 -- -- -- -- -- 95 %   03/22/23 0736 136/82 97 °F (36.1 °C) -- 70 16 97 %   03/22/23 0331 (!) 156/85 97.7 °F (36.5 °C) -- 78 16 95 %   03/21/23 2311 (!) 149/80 97.7 °F (36.5 °C) Temporal 76 16 (!) 93 %   03/21/23 1925 134/69 97.5 °F (36.4 °C) Temporal 73 16 95 %   03/21/23 1919 -- -- -- 69 15 96 %   03/21/23 1631 (!) 149/81 -- -- -- -- --   03/21/23 1519 (!) 149/81 97.8 °F (36.6 °C) Oral 71 18 96 %   03/21/23 1148 (!) 141/67 97.7 °F (36.5 °C) Oral 69 18 (!) 94 %   03/21/23 1146 -- -- -- 75 18 95 %     PHYSICAL EXAM:     GENERAL APPEARANCE: Alert, well-developed, well-nourished male in no acute distress.  HEENT: Normocephalic.  Significant bruising noted on the face and neck PERRL. Oropharynx unremarkable.  PULM: Normal respiratory effort. No accessory muscle use.  CV: RRR.  ABDOMEN: Soft, nontender.        NEURO:  MENTAL STATUS:  Patient is awake, oriented x3.  Able to follow commands appropriately.  Affect euthymic.     CRANIAL NERVES:  Pupils equal round and reactive to light, face is grossly symmetric, extraocular movements are intact.     MOTOR:  Bulk normal. Tone normal and symmetric throughout.  Abnormal movements absent.  Tremor: none present.  Strength 5/5 in bilateral upper  extremities and 4/5 bilateral lower extremities.     REFLEXES:  DTRs 2+ throughout.  Plantar response equivocal bilaterally.  SENSATION:grossly intact throughout.  COORDINATION: normal finger-to-nose.  STATION: not tested.  GAIT: not tested.    CURRENT SCHEDULED MEDICATIONS:   allopurinoL  100 mg Oral QHS    azithromycin  500 mg Intravenous Q24H    calcitRIOL  0.75 mcg Oral Daily    carvediloL  25 mg Oral BID WM    cefTRIAXone (ROCEPHIN) IVPB  1 g Intravenous Q24H    famotidine  40 mg Oral Daily    ferrous sulfate  1 tablet Oral BID    lisinopriL  40 mg Oral QHS    magnesium sulfate IVPB  2 g Intravenous Once    senna-docusate 8.6-50 mg  1 tablet Oral BID    traZODone  50 mg Oral QHS    warfarin  2.5 mg Oral Daily     CURRENT INFUSIONS:    DATA:  Recent Labs   Lab 03/17/23  1812 03/20/23  1418 03/21/23  0505 03/22/23  0514    138 139 140   K 4.7 4.6 4.7 4.3   * 113* 113* 110   CO2 21* 20* 22* 23   BUN 32* 32* 28* 27*   CREATININE 1.6* 1.4 1.3 1.3   * 171* 112* 96   CALCIUM 8.9 8.7 8.6* 8.8   MG  --   --  1.7 1.6   AST 33 46*  --   --    ALT 22 20  --   --      Recent Labs   Lab 03/17/23  1812 03/20/23  1418 03/21/23  0505 03/22/23  0514   WBC 7.15 6.51 6.86 5.82   HGB 10.0* 9.2* 9.1* 8.4*   HCT 30.1* 27.4* 26.7* 24.7*    191 197 187     No results found for: PROTEINCSF, GLUCCSF, WBCCSF, RBCCSF, PMNCSF  Hemoglobin A1C   Date Value Ref Range Status   01/06/2022 5.1 4.0 - 5.6 % Final     Comment:     ADA Screening Guidelines:  5.7-6.4%  Consistent with prediabetes  >or=6.5%  Consistent with diabetes    High levels of fetal hemoglobin interfere with the HbA1C  assay. Heterozygous hemoglobin variants (HbS, HgC, etc)do  not significantly interfere with this assay.   However, presence of multiple variants may affect accuracy.     08/18/2021 5.3 4.0 - 5.6 % Final     Comment:     ADA Screening Guidelines:  5.7-6.4%  Consistent with prediabetes  >or=6.5%  Consistent with diabetes    High levels of  fetal hemoglobin interfere with the HbA1C  assay. Heterozygous hemoglobin variants (HbS, HgC, etc)do  not significantly interfere with this assay.   However, presence of multiple variants may affect accuracy.     11/17/2020 6.1 (H) 4.0 - 5.6 % Final     Comment:     ADA Screening Guidelines:  5.7-6.4%  Consistent with prediabetes  >or=6.5%  Consistent with diabetes  High levels of fetal hemoglobin interfere with the HbA1C  assay. Heterozygous hemoglobin variants (HbS, HgC, etc)do  not significantly interfere with this assay.   However, presence of multiple variants may affect accuracy.     06/17/2013 6.8 (H) 4.8 - 5.6 % Final     Comment:              Increased risk for diabetes: 5.7 - 6.4           Diabetes: >6.4           Glycemic control for adults with diabetes: <7.0  **In order to standardize %HbA1c results worldwide, as of October 11, 2010,  the %HbA1c is being calculated using the master equation recommended in the  consensus statement adopted by the ADA (American Diabetes Assoc), EASD  (European Assoc for the Study of Diabetes), IFCC (International Federation  of Clinical Chemistry and Laboratory Medicine) and IDF (International  Diabetes Federation). Result units: %HgbA1c (DCCT/NGSP).  In common with other methods, Hb A1C values may not accurately reflect mean  blood glucose in patients with hemoglobin variants (HgbF, HgbS and HgbC).  Any cause of shortened erythrocyte survival will reduce exposure of  erythrocytes to glucose with a consequent decrease in HbA1c (%) values, even  though the time-averaged blood glucose level may be elevated. Causes of  shortened erythrocyte lifetime might be hemolytic anemia or other hemolytic  diseases, homozygous sickle cell trait, pregnancy, recent significant or  chronic blood loss, etc. Caution should be used when interpreting the HbA1c  results from patients with these conditions.            I have personally reviewed and interpreted the pertinent imaging and lab  results.  Imaging Results              CT Head Without Contrast (Final result)  Result time 03/20/23 15:05:03      Final result by Oracio García MD (03/20/23 15:05:03)                   Impression:      Frontal scalp hematoma.  No acute intracranial injury.    Bilateral nasal bone fractures.      Electronically signed by: Oracio García MD  Date:    03/20/2023  Time:    15:05               Narrative:    EXAMINATION:  CT HEAD WITHOUT CONTRAST    CLINICAL HISTORY:  Head trauma, minor (Age >= 65y);    TECHNIQUE:  Low dose axial images were obtained through the head.  Coronal and sagittal reformations were also performed. Contrast was not administered.    COMPARISON:  03/17/2023    FINDINGS:  There is a large bifrontal scalp hematoma, without significant change.  There is no intracranial hemorrhage.  No extra-axial collection.  Gray-white demonstration is well maintained the ventricles are nondilated.  There are mild chronic white matter changes.  No mass or midline shift.  There is calcification of the intracranial arteries.  There are bilateral nasal bone fractures.                                       X-Ray Chest 1 View (Final result)  Result time 03/20/23 14:45:39      Final result by Bernie Lyon MD (03/20/23 14:45:39)                   Impression:      Somewhat nodular patchy opacification left perihilar most likely pneumonia but recommend follow-up to be sure that there is complete clearing      Electronically signed by: Bernie Lyon MD  Date:    03/20/2023  Time:    14:45               Narrative:    EXAMINATION:  XR CHEST 1 VIEW    CLINICAL HISTORY:  Weakness, r/o pna;    TECHNIQUE:  Single frontal view of the chest was performed.    COMPARISON:  03/17/2023    FINDINGS:  Stable cardiomediastinal silhouette.  Patchy somewhat nodular opacities left perihilar which may represent mild infiltrate.  Suggest follow-up to ever to be sure there is complete clearing and more reliably evaluate for  underlying pulmonary nodule.  No infiltrate seen in the right lung.  The costophrenic sulci are sharp.                                              ASSESSMENT AND PLAN:       Lower extremity weakness  Fall  Cervical and lumbar spinal canal stenosis  Hospital-acquired delirium       MRI brain:  No acute intracranial abnormality   MRI C-spine: Severe spinal canal stenosis at C6-7 with complete effacement of the thecal sac and severe cord compression.  Severe canal stenosis cord compression also noted C5-6. Multilevel spondylolisthesis and advanced spondylosis  MRI L-spine:at L4-5 with severe right moderate to severe left neural foraminal narrowing and severe spinal canal stenosis. Moderate to severe spinal canal stenosis also noted at L2-3 and L3-4.  Moderate to severe neural foraminal stenosis at L2-3 and L3-4.     Plan:  Etiology of lower extremity weakness could be secondary L spine pathology causing significant spinal canal and neural foraminal stenosis.   Recommend neurosurgery evaluation for MRI C-spine and L-spine findings  Physical and occupational therapy evaluate and treat  Delirium precautions.  If has episodes of delirium, can use low-dose of Seroquel.  Will follow       Enmanuel Palma MD  Neurology/vascular Neurology  Date of Service: 03/22/2023  9:38 AM    Please note: This note was transcribed using voice recognition software. Because of this technology there are often uinintended grammatical, spelling, and other transcription errors. Please disregard these errors.

## 2023-03-22 NOTE — CONSULTS
[facility]  Hematology/Oncology  Consult Note    Patient Name: Richard Bustos  MRN: [id number]  Admission Date: 3/20/2023  Hospital Length of Stay: 0 days  Code Status: Full Code   Attending Provider: Bill Gamino MD  Consulting Provider: Di Drake NP  Primary Care Physician: Familia Ramirez Jr, MD  Principal Problem:Pneumonia    Consults  Subjective:     HPI: Richard Bustos is a 78 y/o M with a past medical history significant for anemia, CAD, CHF, CKD, atrial fibrillation on warfarin, DM2, HLD, HTN, and GERD who presented to the ED for further evaluation of BLE weakness which began following a fall 3 days ago.  Pt reports that he was walking down a ramp at home when he slipped due to the ramp being wet.  He fell to his knees, but continued the fall onto his face and head and had positive loss of consciousness.  He was initially seen at [facility] ED where he was transferred to [facility].  He reports an extensive workup with no fractures seen and was ultimately discharged home.  He reports difficulty bearing weight since the fall and feels substantially weaker than his baseline.  He reports that he normally uses a cane for ambulation, but has not been able to ambulate at all since his fall 3 days ago.  Today in the ED, CT head is significant for frontal scalp hematoma with no acute intracranial injury seen; bilateral nasal bone fractures are noted.  CXR is concerning for pneumonia. Per med rec     Hematology consult:   We have been consulted regarding MTHFR mutation.  At the time my visit patient is in bed eating lunch.  He is feeling well today voices no complaints  Medications:  Continuous Infusions:   heparin (porcine) in D5W       Scheduled Meds:   allopurinoL  100 mg Oral QHS    amiodarone  200 mg Oral BID    azithromycin  500 mg Intravenous Q24H    calcitRIOL  0.75 mcg Oral Daily    carvediloL  25 mg Oral BID WM    cefTRIAXone (ROCEPHIN) IVPB  1 g Intravenous Q24H    famotidine  40 mg Oral Daily     ferrous sulfate  1 tablet Oral BID    heparin (PORCINE)  60 Units/kg (Adjusted) Intravenous Once    lisinopriL  40 mg Oral QHS    magnesium sulfate IVPB  2 g Intravenous Once    senna-docusate 8.6-50 mg  1 tablet Oral BID    traZODone  50 mg Oral QHS     PRN Meds:acetaminophen, albuterol-ipratropium, aluminum-magnesium hydroxide-simethicone, dextrose 10%, dextrose 10%, dextrose, dextrose, fluticasone propionate, glucagon (human recombinant), heparin (PORCINE), heparin (PORCINE), HYDROcodone-acetaminophen, melatonin, naloxone, ondansetron, simethicone, sodium chloride 0.9%     Review of patient's allergies indicates:   Allergen Reactions    Donepezil Other (See Comments)     AMS    Bactroban [mupirocin calcium] Blisters     Causes Blisters    Shellfish containing products Other (See Comments)     Other reaction(s): Gout  OYSTERS        Past Medical History:   Diagnosis Date    Anemia     Anemia of other chronic disease 09/13/2017    Anemia, chronic renal failure 09/13/2017    Anemia, unspecified 09/13/2017    Anticoagulant long-term use     Aorta aneurysm     Arthritis     Atrial fibrillation     Bacteremia due to Streptococcus 01/08/2022    CAD (coronary artery disease)     CHF (congestive heart failure)     Chronic kidney disease     Clotting disorder 09/13/2017    Colon polyp     Dementia     Diabetes mellitus     not on meds    Diabetes mellitus type II     Diverticulosis     Elevated PSA     Encounter for blood transfusion     Former smoker     General anesthetics causing adverse effect in therapeutic use     TROUBLE COMING OUT OF ANESTHESIA WITH GASTRIC BYPASS    GERD (gastroesophageal reflux disease)     Gout     Hx of colonic polyps     Hypercoagulable state     Hyperlipidemia     Hypertension     MI, old 2010    MTHFR mutation     Myocardial infarction     9/2010    Osteomyelitis of ankle or foot 03/09/2017    Prostate cancer 06/2016    Sleep apnea     Squamous cell carcinoma 01/23/2018    Left helix,  imiquimod    Venous stasis     Chronic     Past Surgical History:   Procedure Laterality Date    ABDOMINAL SURGERY      CARDIAC SURGERY      3 STENTS     CAUDAL EPIDURAL STEROID INJECTION N/A 6/22/2020    Procedure: INJECTION, STEROID, SPINE, EPIDURAL, CAUDAL;  Surgeon: Evangelist Bose MD;  Location: [facility] OR;  Service: Pain Management;  Laterality: N/A;    COLONOSCOPY  02/2011    diverticulosis with diverticula with clot, no active bleeding    COLONOSCOPY N/A 8/24/2016    Procedure: COLONOSCOPY;  Surgeon: Saroj Borjas MD;  Location: [facility] ENDO;  Service: Endoscopy;  Laterality: N/A;    COLONOSCOPY N/A 11/18/2020    Procedure: COLONOSCOPY;  Surgeon: Saroj Borjas MD;  Location: [facility] ENDO;  Service: Endoscopy;  Laterality: N/A;    COLONOSCOPY N/A 10/27/2021    Procedure: COLONOSCOPY;  Surgeon: Saroj Borjas MD;  Location: [facility] ENDO;  Service: Endoscopy;  Laterality: N/A;    EPIDURAL STEROID INJECTION INTO LUMBAR SPINE N/A 6/22/2020    Procedure: Injection-steroid-epidural-lumbar;  Surgeon: Evangelist Bose MD;  Location: [facility] OR;  Service: Pain Management;  Laterality: N/A;  L3 L4 L5 S1    EPIDURAL STEROID INJECTION INTO LUMBAR SPINE N/A 9/21/2020    Procedure: Injection-steroid-epidural-lumbar;  Surgeon: Evangelist Bose MD;  Location: [facility] OR;  Service: Pain Management;  Laterality: N/A;  L5-S1    GASTRIC BYPASS  2/5/2008    IVC FILTER RETRIEVAL      JOINT REPLACEMENT  1996 and 2001    bi-lat hip replacement/Rt Hip and Lt Hip    RADIOFREQUENCY ABLATION OF LUMBAR MEDIAL BRANCH NERVE AT SINGLE LEVEL Bilateral 1/10/2020    Procedure: Radiofrequency Ablation, Nerve, Spinal, Lumbar, Medial Branch, 1 Level;  Surgeon: Evangelist Bose MD;  Location: [facility] OR;  Service: Pain Management;  Laterality: Bilateral;  L3,L4,L5    RADIOFREQUENCY ABLATION OF LUMBAR MEDIAL BRANCH NERVE AT SINGLE LEVEL Bilateral 11/23/2021    Procedure: Radiofrequency Ablation, Nerve, Spinal, Lumbar, Medial Branch, Bilateral L 3,4,5;   Surgeon: Nile Causey MD;  Location: [facility] OR;  Service: Pain Management;  Laterality: Bilateral;    ROTATOR CUFF REPAIR      Rt shoulder    Stents  8/18/2010    x 3    UPPER GASTROINTESTINAL ENDOSCOPY  02/2011     Family History       Problem Relation (Age of Onset)    Heart attack Mother, Father, Brother    Lupus Daughter    Ulcerative colitis Daughter (35)          Tobacco Use    Smoking status: Former    Smokeless tobacco: Never    Tobacco comments:     45 yrs ago, only smoked 3-4 packs in his entire life as a teenager   Substance and Sexual Activity    Alcohol use: Not Currently     Comment: rare    Drug use: No    Sexual activity: Not Currently       Review of Systems  As per mentioned in HPI; all other systems reviewed and negative  Objective:     Vital Signs (Most Recent):  Temp: 97.2 °F (36.2 °C) (03/22/23 1154)  Pulse: 66 (03/22/23 1336)  Resp: 16 (03/22/23 1336)  BP: 133/63 (03/22/23 1154)  SpO2: 96 % (03/22/23 1336) Vital Signs (24h Range):  Temp:  [97 °F (36.1 °C)-97.8 °F (36.6 °C)] 97.2 °F (36.2 °C)  Pulse:  [65-78] 66  Resp:  [15-18] 16  SpO2:  [93 %-97 %] 96 %  BP: (133-156)/(63-85) 133/63     Weight: 111.1 kg (245 lb)  Body mass index is 36.18 kg/m².  Body surface area is 2.33 meters squared.      Intake/Output Summary (Last 24 hours) at 3/22/2023 1503  Last data filed at 3/22/2023 1216  Gross per 24 hour   Intake --   Output 400 ml   Net -400 ml       Physical Exam  GENERAL: appears well-built, well-nourished.  No anxiety, no agitation, and in no distress.  Patient is awake, alert, oriented and cooperative.  HEENT:  Showed no congestion. Trachea is central no obvious icterus or pallor noted via video.  NECK:  Supple.  No JVD. No obvious cervical submental or supraclavicular adenopathy.  RS:the visualized portion of  Chest expands well. chest appears symmetric, no audible wheezes.  ABDOMEN: the visualized portion of  abdomen appears undistended.  EXTREMITIES:  Without edema.  NEUROLOGICAL:  The  patient is appropriate, higher functions are normal.  No neuro deficits.  No gait abnormality noted.  No confusion, no speech impediment. Cranial nerves are intact and show no deficit. No motor deficits noted through video.  SKIN MUSCULOSKELETAL:  Significant bruising noted to face and neck secondary to recent fall.  He also has abrasions on his forehead and nose  Significant Labs:   CBC:   Recent Labs   Lab 03/21/23  0505 03/22/23  0514 03/22/23  1411   WBC 6.86 5.82 6.03   HGB 9.1* 8.4* 8.7*   HCT 26.7* 24.7* 25.1*    187 195    and CMP:   Recent Labs   Lab 03/21/23  0505 03/22/23  0514    140   K 4.7 4.3   * 110   CO2 22* 23   * 96   BUN 28* 27*   CREATININE 1.3 1.3   CALCIUM 8.6* 8.8   ANIONGAP 4* 7*       Diagnostic Results:  I have reviewed all pertinent imaging results/findings within the past 24 hours.    Assessment/Plan:     Active Diagnoses:    Diagnosis Date Noted POA    PRINCIPAL PROBLEM:  Pneumonia [J18.9] 03/20/2023 Yes    Spinal cord compression [G95.20] 03/22/2023 Yes    Closed nondisplaced fracture of sixth cervical vertebra [S12.501A] 03/21/2023 Yes    Warfarin anticoagulation [Z79.01] 11/17/2020 Not Applicable    Chronic systolic congestive heart failure [I50.22] 10/19/2019 Yes    Anemia due to stage 3 chronic kidney disease [N18.30, D63.1] 09/13/2017 Yes    Generalized weakness [R53.1] 12/06/2016 Yes    Stasis dermatitis of both legs [I87.2] 01/22/2015 Yes    Paroxysmal atrial fibrillation [I48.0] 11/21/2014 Yes    CKD (chronic kidney disease) stage 3, GFR 30-59 ml/min, 41 [N18.30] 01/23/2013 Yes    GERD (gastroesophageal reflux disease) [K21.9] 12/16/2011 Yes    Diabetes mellitus with chronic kidney disease, stage III [E11.22] 12/16/2011 Yes     Chronic    Hypertension associated with diabetes [E11.59, I15.2] 12/16/2011 Yes    MTHFR mutation (methylenetetrahydrofolate reductase) [Z15.89] 12/16/2011 Not Applicable      Problems Resolved During this Admission:       MTHFR  mutation:   -mutation alone has no indication for anticoagulation  -okay to proceed with surgery    Multifocal spinal canal stenosis:   -neurosurgery on board  -cleared for certain sclerae from Hematology standpoint    Atrial fibrillation:  -on Coumadin  -we will defer bridging recommendations to Cardiology    Case discussed in detail with Dr. Ferrell    Thank you for your consult. I will sign off. Please contact us if you have any additional questions.    Di Drake NP  Hematology/Oncology  [facility]

## 2023-03-22 NOTE — ASSESSMENT & PLAN NOTE
Patient's anemia is currently controlled. S/p 0 units of PRBCs.  Current CBC reviewed-   Lab Results   Component Value Date    HGB 8.4 (L) 03/22/2023    HCT 24.7 (L) 03/22/2023    MCV 98 03/22/2023     03/22/2023     Monitor serial CBC and transfuse if patient becomes hemodynamically unstable, symptomatic or H/H drops below 7/21.

## 2023-03-22 NOTE — CONSULTS
Consult Note  Neurosurgery    Consult Requested By: Dr. Jackson  Reason for Consult:  Extremity weakness      SUBJECTIVE:     History of Present Illness per EMR:    Richard Bustos is a 78 y/o M with a past medical history significant for anemia, CAD, CHF, CKD, atrial fibrillation on warfarin, DM2, HLD, HTN, and GERD who presented to the ED for further evaluation of BLE weakness which began following a fall 3 days ago.  Pt reports that he was walking down a ramp at home when he slipped due to the ramp being wet.  He fell to his knees, but continued the fall onto his face and head and had positive loss of consciousness.  He was initially seen at [facility] ED where he was transferred to [facility].  He reports an extensive workup with no fractures seen and was ultimately discharged home.  He reports difficulty bearing weight since the fall and feels substantially weaker than his baseline.  He reports that he normally uses a cane for ambulation, but has not been able to ambulate at all since his fall 3 days ago.  Today in the ED, CT head is significant for frontal scalp hematoma with no acute intracranial injury seen; bilateral nasal bone fractures are noted.  CXR is concerning for pneumonia.  He will be placed in observation under the service of hospital medicine for continued monitoring and treatment.    Neurosurgical consult:  As above.  He also endorses a 2 month history of worsening bilateral upper extremity weakness.  He reports hand clumsiness with frequent dropping of objects.  He reports bilateral hand numbness.  He reports ongoing bilateral lower extremity weakness.  He denies sphincter dysfunction.  He reports chronic neck and back pain.  Prior to the above fall he was ambulating with a single-point cane.  Reports easy bruising and bleeding.  Reports history of clotting disorder.  History of atrial fibrillation.  On Coumadin and Plavix at home    Aspirin and Plavix discontinued today.  Cardiology and hematology  consult pending.    Scheduled Meds:   allopurinoL  100 mg Oral QHS    azithromycin  500 mg Intravenous Q24H    calcitRIOL  0.75 mcg Oral Daily    carvediloL  25 mg Oral BID WM    cefTRIAXone (ROCEPHIN) IVPB  1 g Intravenous Q24H    famotidine  40 mg Oral Daily    ferrous sulfate  1 tablet Oral BID    heparin (PORCINE)  60 Units/kg (Adjusted) Intravenous Once    lisinopriL  40 mg Oral QHS    senna-docusate 8.6-50 mg  1 tablet Oral BID    traZODone  50 mg Oral QHS     Continuous Infusions:   heparin (porcine) in D5W       PRN Meds:acetaminophen, albuterol-ipratropium, aluminum-magnesium hydroxide-simethicone, dextrose 10%, dextrose 10%, dextrose, dextrose, fluticasone propionate, glucagon (human recombinant), heparin (PORCINE), heparin (PORCINE), HYDROcodone-acetaminophen, melatonin, naloxone, ondansetron, simethicone, sodium chloride 0.9%    Review of patient's allergies indicates:   Allergen Reactions    Donepezil Other (See Comments)     AMS    Bactroban [mupirocin calcium] Blisters     Causes Blisters    Shellfish containing products Other (See Comments)     Other reaction(s): Gout  OYSTERS       Past Medical History:   Diagnosis Date    Anemia     Anemia of other chronic disease 09/13/2017    Anemia, chronic renal failure 09/13/2017    Anemia, unspecified 09/13/2017    Anticoagulant long-term use     Aorta aneurysm     Arthritis     Atrial fibrillation     Bacteremia due to Streptococcus 01/08/2022    CAD (coronary artery disease)     CHF (congestive heart failure)     Chronic kidney disease     Clotting disorder 09/13/2017    Colon polyp     Dementia     Diabetes mellitus     not on meds    Diabetes mellitus type II     Diverticulosis     Elevated PSA     Encounter for blood transfusion     Former smoker     General anesthetics causing adverse effect in therapeutic use     TROUBLE COMING OUT OF ANESTHESIA WITH GASTRIC BYPASS    GERD (gastroesophageal reflux disease)     Gout     Hx of colonic polyps      Hypercoagulable state     Hyperlipidemia     Hypertension     MI, old 2010    MTHFR mutation     Myocardial infarction     9/2010    Osteomyelitis of ankle or foot 03/09/2017    Prostate cancer 06/2016    Sleep apnea     Squamous cell carcinoma 01/23/2018    Left helix, imiquimod    Venous stasis     Chronic     Past Surgical History:   Procedure Laterality Date    ABDOMINAL SURGERY      CARDIAC SURGERY      3 STENTS     CAUDAL EPIDURAL STEROID INJECTION N/A 6/22/2020    Procedure: INJECTION, STEROID, SPINE, EPIDURAL, CAUDAL;  Surgeon: Evangelist Bose MD;  Location: [facility] OR;  Service: Pain Management;  Laterality: N/A;    COLONOSCOPY  02/2011    diverticulosis with diverticula with clot, no active bleeding    COLONOSCOPY N/A 8/24/2016    Procedure: COLONOSCOPY;  Surgeon: Saroj Borjas MD;  Location: [facility] ENDO;  Service: Endoscopy;  Laterality: N/A;    COLONOSCOPY N/A 11/18/2020    Procedure: COLONOSCOPY;  Surgeon: Saroj Borjas MD;  Location: [facility] ENDO;  Service: Endoscopy;  Laterality: N/A;    COLONOSCOPY N/A 10/27/2021    Procedure: COLONOSCOPY;  Surgeon: Saroj Borjas MD;  Location: [facility] ENDO;  Service: Endoscopy;  Laterality: N/A;    EPIDURAL STEROID INJECTION INTO LUMBAR SPINE N/A 6/22/2020    Procedure: Injection-steroid-epidural-lumbar;  Surgeon: Evangelist Bose MD;  Location: [facility] OR;  Service: Pain Management;  Laterality: N/A;  L3 L4 L5 S1    EPIDURAL STEROID INJECTION INTO LUMBAR SPINE N/A 9/21/2020    Procedure: Injection-steroid-epidural-lumbar;  Surgeon: Evangelist Bose MD;  Location: [facility] OR;  Service: Pain Management;  Laterality: N/A;  L5-S1    GASTRIC BYPASS  2/5/2008    IVC FILTER RETRIEVAL      JOINT REPLACEMENT  1996 and 2001    bi-lat hip replacement/Rt Hip and Lt Hip    RADIOFREQUENCY ABLATION OF LUMBAR MEDIAL BRANCH NERVE AT SINGLE LEVEL Bilateral 1/10/2020    Procedure: Radiofrequency Ablation, Nerve, Spinal, Lumbar, Medial Branch, 1 Level;  Surgeon: Evangelist Bose MD;   Location: [facility] OR;  Service: Pain Management;  Laterality: Bilateral;  L3,L4,L5    RADIOFREQUENCY ABLATION OF LUMBAR MEDIAL BRANCH NERVE AT SINGLE LEVEL Bilateral 11/23/2021    Procedure: Radiofrequency Ablation, Nerve, Spinal, Lumbar, Medial Branch, Bilateral L 3,4,5;  Surgeon: Nile Causey MD;  Location: [facility] OR;  Service: Pain Management;  Laterality: Bilateral;    ROTATOR CUFF REPAIR      Rt shoulder    Stents  8/18/2010    x 3    UPPER GASTROINTESTINAL ENDOSCOPY  02/2011     Family History   Problem Relation Age of Onset    Heart attack Mother     Heart attack Father     Heart attack Brother     Ulcerative colitis Daughter 35    Lupus Daughter     Colon cancer Neg Hx     Colon polyps Neg Hx     Crohn's disease Neg Hx     Melanoma Neg Hx     Psoriasis Neg Hx     Eczema Neg Hx      Social History     Tobacco Use    Smoking status: Former    Smokeless tobacco: Never    Tobacco comments:     45 yrs ago, only smoked 3-4 packs in his entire life as a teenager   Substance Use Topics    Alcohol use: Not Currently     Comment: rare    Drug use: No        Review of Systems:  As above.  Denies fevers chills shortness of breath chest pain abdominal pain.  Endorses fatigue.    OBJECTIVE:     Vital Signs (Most Recent)  Temp: 97.2 °F (36.2 °C) (03/22/23 1154)  Pulse: 65 (03/22/23 1154)  Resp: 16 (03/22/23 1154)  BP: 133/63 (03/22/23 1154)  SpO2: 95 % (03/22/23 1154)    Vital Signs Range (Last 24H):  Temp:  [97 °F (36.1 °C)-97.8 °F (36.6 °C)]   Pulse:  [65-78]   Resp:  [15-18]   BP: (133-156)/(63-85)   SpO2:  [93 %-97 %]     Physical Exam:  Neurosurgery Physical Exam  No distress.  Multiple areas of facial and extremity ecchymosis.  AAO x3.  Pupils equal, reactive.  Face symmetric.  Left deltoid 4/5 left biceps 4-/5, left triceps 3/5, right deltoid 4/5, right biceps 4/5, right triceps 4/5, bilateral  2/5, bilateral IO 2/5, bilateral IP/quad 4/5, bilateral EHL/TA 4/5, bilateral gastrocnemius 4/5.  Sluggish hand open/close  bilateral. Hyperreflexic in all extremities.  Steffany sign not elicited.  Sensation grossly intact.  Gait not tested.  Laboratory:  CBC:   Recent Labs   Lab 03/22/23  0514   WBC 5.82   RBC 2.51*   HGB 8.4*   HCT 24.7*      MCV 98   MCH 33.5*   MCHC 34.0     BMP:   Recent Labs   Lab 03/22/23  0514   GLU 96      K 4.3      CO2 23   BUN 27*   CREATININE 1.3   CALCIUM 8.8   MG 1.6     CMP:   Recent Labs   Lab 03/20/23  1418 03/21/23  0505 03/22/23  0514   *   < > 96   CALCIUM 8.7   < > 8.8   ALBUMIN 2.4*  --   --    PROT 5.6*  --   --       < > 140   K 4.6   < > 4.3   CO2 20*   < > 23   *   < > 110   BUN 32*   < > 27*   CREATININE 1.4   < > 1.3   ALKPHOS 91  --   --    ALT 20  --   --    AST 46*  --   --    BILITOT 0.5  --   --     < > = values in this interval not displayed.     LFTs:   Recent Labs   Lab 03/20/23  1418   ALT 20   AST 46*   ALKPHOS 91   BILITOT 0.5   PROT 5.6*   ALBUMIN 2.4*     Coagulation:   Recent Labs   Lab 03/22/23  0514   LABPROT 17.7*   INR 1.7*     Cardiac markers: No results for input(s): CKMB, CPKMB, TROPONINT, TROPONINI, MYOGLOBIN in the last 168 hours.  ABGs: No results for input(s): PH, PCO2, PO2, HCO3, POCSATURATED, BE in the last 168 hours.  Microbiology Results (last 7 days)       ** No results found for the last 168 hours. **          Specimen (24h ago, onward)      None          Recent Labs   Lab 03/20/23  1513   COLORU Yellow   SPECGRAV 1.015   PHUR 5.0   PROTEINUA 3+*   BACTERIA Occasional   NITRITE Negative   LEUKOCYTESUR Negative   UROBILINOGEN Negative   HYALINECASTS 1         Diagnostic Results:  CT: Reviewed  MRI: Reviewed  MRI brain:   1. No evidence of an acute intracranial abnormality.  2. Large frontal scalp hematoma again demonstrated.  3. Mild generalized cerebral volume loss and chronic microvascular ischemic changes.     MRI cervical spine:    1. Significant motion limited study.  2. Reactive marrow edema within the posterior C6  vertebral body compatible with known posterior spinous process and lamina fractures, better evaluated on prior CT study.  3. Multilevel spondylolisthesis and advanced spondylosis, as detailed above, most pronounced at C3-4 through C6-7, resulting in moderate to severe neural foraminal and spinal canal stenosis.  4. Severe spinal canal stenosis at C6-7 with complete effacement of the thecal sac and severe cord compression.  Severe canal stenosis cord compression also noted C5-6.  Mild-to-moderate cord flattening at C3-4 and C4-5.  Question abnormal increased T2/STIR hyperintense signal at C4-5 through C6-7, possibly reflecting myelomalacia versus edema, which could be correlated with acuity of symptoms.  Neurosurgery consultation advised.  5. Possible focal disruption of the anterior longitudinal ligament at C6 and C7.  Mild increased disc signal at C6-7, which may be posttraumatic or degenerative.  6. Possible focal destruction of the ligamentum flavum at C5-6 and C6-7.  Moderate edema within the inter spinous ligaments and posterior soft tissues at C5-C7 compatible with posttraumatic injury.     MRI lumbar spine:  1. Marked multilevel degenerative change of the lumbar spine again demonstrated, as detailed above, mildly progressed in comparison to the prior study from 06/04/2020.  2. Findings again are most pronounced at L4-5 with severe right moderate to severe left neural foraminal narrowing and severe spinal canal stenosis.  3. Moderate to severe spinal canal stenosis also noted at L2-3 and L3-4.  Moderate to severe neural foraminal stenosis at L2-3 and L3-4.     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  Hypertrophic changes at the AC joint with both superior and inferior spur formation.        CT cervical spine:  C6 lamina and spinous process fracture  ASSESSMENT/PLAN:     Multifocal spinal canal stenosis with myelopathy   Spinal cord compression  Nondisplaced C6 lamina fracture with cervical  ligamentous injuries  Multifocal lumbar spinal stenosis  Multiple medical comorbidities  Long-term anticoagulation, Plavix and Coumadin  Pneumonia    Recommendations:     I updated the patient with the above findings in detail.  I recommend both cervical and lumbar surgery performed in a staged manner.  I outlined posterior cervical decompression with instrumented arthrodesis C3-T1 initially.  I reviewed the material risks, goals of surgery, and treatment alternatives.  I explained that after adequate recovery, additional decompression and stabilization could be undertaken relative to the lumbar pathology.  Given his multiple medical comorbidities,  I explained that conservative management of the cervical fracture and ligamentous injury with bracing is reasonable.  However, I explained that he does have risk for significant spinal cord injury with another fall or other traumatic event.  Likewise, his neurologic deficits are likely to worsen without definitive intervention. He voiced understanding.  After discussion with his wife he has elected to proceed with surgery.      Agree with Cardiology and Hematology preoperative clearance.  Please also optimize medically as possible    Coumadin will need to be reversed with normal INR preop    He will also need platelets preop with 1 unit on hold for OR    Will tentatively plan for surgery 03/24/2023     Please call with questions

## 2023-03-22 NOTE — ASSESSMENT & PLAN NOTE
Chronic, controlled.  Latest blood pressure and vitals reviewed-   Temp:  [97 °F (36.1 °C)-97.8 °F (36.6 °C)]   Pulse:  [65-78]   Resp:  [15-18]   BP: (133-156)/(63-85)   SpO2:  [93 %-97 %] .   Home meds for hypertension were reviewed and noted below.   Hypertension Medications             carvediloL (COREG) 25 MG tablet Take 1 tablet (25 mg total) by mouth 2 (two) times daily with meals.    lisinopriL (PRINIVIL,ZESTRIL) 40 MG tablet Take 1 tablet (40 mg total) by mouth every evening.    nitroGLYCERIN 0.4 MG/DOSE TL SPRY (NITROLINGUAL) 400 mcg/spray spray PLACE 1 SPRAY UNDER THE TONGUE EVERY 5 MINUTES AS NEEDED FOR CHEST PAIN          While in the hospital, will manage blood pressure as follows; Adjust home antihypertensive regimen as follows- continue chronic beta blocker; will hold ACE for now due to recent EMMY.  Monitor creatinine and resume lisinopril when proven stable.-resume nightly lisinopril and continue to monitor.    Will utilize p.r.n. blood pressure medication only if patient's blood pressure greater than  180/110 and he develops symptoms such as worsening chest pain or shortness of breath.

## 2023-03-22 NOTE — SUBJECTIVE & OBJECTIVE
Interval History: somewhat confused this morning.  ASA and plavix discontinued for now.  Cardiology and hematology consulted for further recommendations in light of pending surgery.  Plan of care discussed with pt's daughter at bedside.      Review of Systems   Constitutional:  Positive for fatigue. Negative for chills and fever.   HENT:  Negative for congestion.    Respiratory:  Negative for chest tightness and shortness of breath.    Cardiovascular:  Negative for chest pain and palpitations.   Gastrointestinal:  Negative for abdominal pain, diarrhea, nausea and vomiting.   Genitourinary:  Negative for dysuria and hematuria.   Musculoskeletal:  Positive for arthralgias, gait problem and myalgias.   Skin:  Positive for color change and wound.   Neurological:  Positive for weakness. Negative for headaches.   Hematological:  Bruises/bleeds easily.   Psychiatric/Behavioral:  Negative for agitation and confusion.    All other systems reviewed and are negative.  Objective:     Vital Signs (Most Recent):  Temp: 97 °F (36.1 °C) (03/22/23 0736)  Pulse: 70 (03/22/23 0736)  Resp: 16 (03/22/23 0736)  BP: 136/82 (03/22/23 0853)  SpO2: 95 % (03/22/23 0758) Vital Signs (24h Range):  Temp:  [97 °F (36.1 °C)-97.8 °F (36.6 °C)] 97 °F (36.1 °C)  Pulse:  [69-78] 70  Resp:  [15-18] 16  SpO2:  [93 %-97 %] 95 %  BP: (134-156)/(67-85) 136/82     Weight: 111.2 kg (245 lb 2.4 oz)  Body mass index is 36.2 kg/m².    Intake/Output Summary (Last 24 hours) at 3/22/2023 1056  Last data filed at 3/22/2023 0740  Gross per 24 hour   Intake --   Output 200 ml   Net -200 ml      Physical Exam  Constitutional:       General: He is not in acute distress.     Appearance: He is well-developed. He is ill-appearing.   HENT:      Head: Contusion (forehead) and laceration (right side of nose) present.   Eyes:      Conjunctiva/sclera: Conjunctivae normal.      Pupils: Pupils are equal, round, and reactive to light.   Cardiovascular:      Rate and Rhythm:  Normal rate and regular rhythm.      Pulses: Normal pulses.      Heart sounds: Murmur heard.   Pulmonary:      Effort: Pulmonary effort is normal.      Breath sounds: Normal breath sounds.   Abdominal:      General: Bowel sounds are normal.      Palpations: Abdomen is soft.   Musculoskeletal:         General: Tenderness (right shoulder) present.      Cervical back: Normal range of motion and neck supple.      Right lower leg: Edema present.      Left lower leg: Edema present.      Comments: Decreased ROM R shoulder   Skin:     General: Skin is warm and dry.      Findings: Bruising and rash (chronic skin changes to BLE) present.      Comments: Diffuse ecchymosis face and neck   Neurological:      General: No focal deficit present.      Mental Status: He is alert. He is disoriented.      Comments: Confused at to place and time   Psychiatric:         Mood and Affect: Mood is depressed.         Behavior: Behavior normal.       Significant Labs: All pertinent labs within the past 24 hours have been reviewed.  CBC:   Recent Labs   Lab 03/20/23  1418 03/21/23  0505 03/22/23  0514   WBC 6.51 6.86 5.82   HGB 9.2* 9.1* 8.4*   HCT 27.4* 26.7* 24.7*    197 187     CMP:   Recent Labs   Lab 03/20/23  1418 03/21/23  0505 03/22/23  0514    139 140   K 4.6 4.7 4.3   * 113* 110   CO2 20* 22* 23   * 112* 96   BUN 32* 28* 27*   CREATININE 1.4 1.3 1.3   CALCIUM 8.7 8.6* 8.8   PROT 5.6*  --   --    ALBUMIN 2.4*  --   --    BILITOT 0.5  --   --    ALKPHOS 91  --   --    AST 46*  --   --    ALT 20  --   --    ANIONGAP 5* 4* 7*     Coagulation:   Recent Labs   Lab 03/22/23  0514   INR 1.7*       Significant Imaging: I have reviewed all pertinent imaging results/findings within the past 24 hours.  MRI brain:   1. No evidence of an acute intracranial abnormality.  2. Large frontal scalp hematoma again demonstrated.  3. Mild generalized cerebral volume loss and chronic microvascular ischemic changes.    MRI cervical  spine:    1. Significant motion limited study.  2. Reactive marrow edema within the posterior C6 vertebral body compatible with known posterior spinous process and lamina fractures, better evaluated on prior CT study.  3. Multilevel spondylolisthesis and advanced spondylosis, as detailed above, most pronounced at C3-4 through C6-7, resulting in moderate to severe neural foraminal and spinal canal stenosis.  4. Severe spinal canal stenosis at C6-7 with complete effacement of the thecal sac and severe cord compression.  Severe canal stenosis cord compression also noted C5-6.  Mild-to-moderate cord flattening at C3-4 and C4-5.  Question abnormal increased T2/STIR hyperintense signal at C4-5 through C6-7, possibly reflecting myelomalacia versus edema, which could be correlated with acuity of symptoms.  Neurosurgery consultation advised.  5. Possible focal disruption of the anterior longitudinal ligament at C6 and C7.  Mild increased disc signal at C6-7, which may be posttraumatic or degenerative.  6. Possible focal destruction of the ligamentum flavum at C5-6 and C6-7.  Moderate edema within the inter spinous ligaments and posterior soft tissues at C5-C7 compatible with posttraumatic injury.    MRI lumbar spine:  1. Marked multilevel degenerative change of the lumbar spine again demonstrated, as detailed above, mildly progressed in comparison to the prior study from 06/04/2020.  2. Findings again are most pronounced at L4-5 with severe right moderate to severe left neural foraminal narrowing and severe spinal canal stenosis.  3. Moderate to severe spinal canal stenosis also noted at L2-3 and L3-4.  Moderate to severe neural foraminal stenosis at L2-3 and L3-4.    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  Hypertrophic changes at the AC joint with both superior and inferior spur formation.

## 2023-03-22 NOTE — ASSESSMENT & PLAN NOTE
Patient with Long standing persistent (>12 months) atrial fibrillation which is controlled currently with Beta Blocker. Patient is currently in atrial fibrillation.[id number] Score: 4.  Anticoagulation indicated. Anticoagulation done with warfarin..

## 2023-03-22 NOTE — RESPIRATORY THERAPY
02 saturation 95% on room air.  Patient averaging 1500ml on IS.  PRN tx not required at this time.

## 2023-03-22 NOTE — CONSULTS
Chief complaint:  Extremity Weakness (Pt had a fall Friday at his home and was seen at [facility], since being discharged pt reports weakness in both legs.  /)      HPI:  Richadr Bustos is a 77 y.o. male presenting with multiple skin tears of the BL upper and lower extremities. Pt also has multiple lacerations of the face. Pt had a fall at home last week, and had multiple lacerations. Pt is on coumadin and it compounded his skin tears. Pt has no other complaints today.    PMH:  As per HPI and below:  Past Medical History:   Diagnosis Date    Anemia     Anemia of other chronic disease 09/13/2017    Anemia, chronic renal failure 09/13/2017    Anemia, unspecified 09/13/2017    Anticoagulant long-term use     Aorta aneurysm     Arthritis     Atrial fibrillation     Bacteremia due to Streptococcus 01/08/2022    CAD (coronary artery disease)     CHF (congestive heart failure)     Chronic kidney disease     Clotting disorder 09/13/2017    Colon polyp     Dementia     Diabetes mellitus     not on meds    Diabetes mellitus type II     Diverticulosis     Elevated PSA     Encounter for blood transfusion     Former smoker     General anesthetics causing adverse effect in therapeutic use     TROUBLE COMING OUT OF ANESTHESIA WITH GASTRIC BYPASS    GERD (gastroesophageal reflux disease)     Gout     Hx of colonic polyps     Hypercoagulable state     Hyperlipidemia     Hypertension     MI, old 2010    MTHFR mutation     Myocardial infarction     9/2010    Osteomyelitis of ankle or foot 03/09/2017    Prostate cancer 06/2016    Sleep apnea     Squamous cell carcinoma 01/23/2018    Left helix, imiquimod    Venous stasis     Chronic       Social History     Socioeconomic History    Marital status:    Tobacco Use    Smoking status: Former    Smokeless tobacco: Never    Tobacco comments:     45 yrs ago, only smoked 3-4 packs in his entire life as a teenager   Substance and Sexual Activity    Alcohol use: Not Currently     Comment: rare     Drug use: No    Sexual activity: Not Currently       Past Surgical History:   Procedure Laterality Date    ABDOMINAL SURGERY      CARDIAC SURGERY      3 STENTS     CAUDAL EPIDURAL STEROID INJECTION N/A 6/22/2020    Procedure: INJECTION, STEROID, SPINE, EPIDURAL, CAUDAL;  Surgeon: Evangelist Bose MD;  [facility];  Service: Pain Management;  Laterality: N/A;    COLONOSCOPY  02/2011    diverticulosis with diverticula with clot, no active bleeding    COLONOSCOPY N/A 8/24/2016    Procedure: COLONOSCOPY;  Surgeon: Saroj Borjas MD;  Location: [facility] ENDO;  Service: Endoscopy;  Laterality: N/A;    COLONOSCOPY N/A 11/18/2020    Procedure: COLONOSCOPY;  Surgeon: Saroj Borjas MD;  Location: [facility] ENDO;  Service: Endoscopy;  Laterality: N/A;    COLONOSCOPY N/A 10/27/2021    Procedure: COLONOSCOPY;  Surgeon: Saroj Borjas MD;  Location: [facility] ENDO;  Service: Endoscopy;  Laterality: N/A;    EPIDURAL STEROID INJECTION INTO LUMBAR SPINE N/A 6/22/2020    Procedure: Injection-steroid-epidural-lumbar;  Surgeon: Evangelist Bose MD;  [facility];  Service: Pain Management;  Laterality: N/A;  L3 L4 L5 S1    EPIDURAL STEROID INJECTION INTO LUMBAR SPINE N/A 9/21/2020    Procedure: Injection-steroid-epidural-lumbar;  Surgeon: Evangelist Bose MD;  [facility];  Service: Pain Management;  Laterality: N/A;  L5-S1    GASTRIC BYPASS  2/5/2008    IVC FILTER RETRIEVAL      JOINT REPLACEMENT  1996 and 2001    bi-lat hip replacement/Rt Hip and Lt Hip    RADIOFREQUENCY ABLATION OF LUMBAR MEDIAL BRANCH NERVE AT SINGLE LEVEL Bilateral 1/10/2020    Procedure: Radiofrequency Ablation, Nerve, Spinal, Lumbar, Medial Branch, 1 Level;  Surgeon: Evangelist Bose MD;  Location: [facility] OR;  Service: Pain Management;  Laterality: Bilateral;  L3,L4,L5    RADIOFREQUENCY ABLATION OF LUMBAR MEDIAL BRANCH NERVE AT SINGLE LEVEL Bilateral 11/23/2021    Procedure: Radiofrequency Ablation, Nerve, Spinal, Lumbar, Medial Branch, Bilateral L  3,4,5;  Surgeon: Nile Causey MD;  Location: [facility] OR;  Service: Pain Management;  Laterality: Bilateral;    ROTATOR CUFF REPAIR      Rt shoulder    Stents  8/18/2010    x 3    UPPER GASTROINTESTINAL ENDOSCOPY  02/2011       Family History   Problem Relation Age of Onset    Heart attack Mother     Heart attack Father     Heart attack Brother     Ulcerative colitis Daughter 35    Lupus Daughter     Colon cancer Neg Hx     Colon polyps Neg Hx     Crohn's disease Neg Hx     Melanoma Neg Hx     Psoriasis Neg Hx     Eczema Neg Hx        Review of patient's allergies indicates:   Allergen Reactions    Donepezil Other (See Comments)     AMS    Bactroban [mupirocin calcium] Blisters     Causes Blisters    Shellfish containing products Other (See Comments)     Other reaction(s): Gout  OYSTERS       Current Facility-Administered Medications on File Prior to Encounter   Medication Dose Route Frequency Provider Last Rate Last Admin    lactated ringers infusion   Intravenous Once PRN Evangelist Bose MD         Current Outpatient Medications on File Prior to Encounter   Medication Sig Dispense Refill    aspirin (ECOTRIN) 81 MG EC tablet Take 81 mg by mouth once daily.      allopurinoL (ZYLOPRIM) 100 MG tablet Take 1 tablet (100 mg total) by mouth every evening. 90 tablet 3    atorvastatin (LIPITOR) 80 MG tablet Take 1 tablet (80 mg total) by mouth once daily. 90 tablet 3    calcitRIOL (ROCALTROL) 0.5 MCG Cap 0.75 mcg once daily.   4    carvediloL (COREG) 25 MG tablet Take 1 tablet (25 mg total) by mouth 2 (two) times daily with meals. 180 tablet 3    clopidogreL (PLAVIX) 75 mg tablet Take 1 tablet (75 mg total) by mouth once daily. 90 tablet 3    famotidine (PEPCID) 40 MG tablet Take 1 tablet (40 mg total) by mouth once daily. 90 tablet 3    ferrous sulfate (FEROSUL) 325 mg (65 mg iron) Tab tablet TAKE 1 TABLET BY MOUTH TWICE DAILY 180 tablet 3    fluticasone propionate (FLONASE) 50 mcg/actuation nasal spray SHAKE LIQUID AND USE 2  SPRAYS(100 MCG) IN EACH NOSTRIL EVERY DAY 16 g 5    FOLIC ACID/MULTIVIT-MIN/LUTEIN (CENTRUM SILVER ORAL) Take 1 tablet by mouth once daily.      HYDROcodone-acetaminophen (NORCO) 5-325 mg per tablet Take 1 tablet by mouth every 8 (eight) hours as needed for Pain. 90 tablet 0    LIDOcaine (LIDODERM) 5 % Place 1 patch onto the skin once daily. Remove & Discard patch within 12 hours or as directed by MD 15 patch 0    lisinopriL (PRINIVIL,ZESTRIL) 40 MG tablet Take 1 tablet (40 mg total) by mouth every evening. 90 tablet 3    magnesium oxide (MAG-OX) 400 mg (241.3 mg magnesium) tablet Take 1 tablet (400 mg total) by mouth once daily. 90 tablet 3    mecobal-levomefolat Ca-B6 phos (L-METHYL-B6-B12) 3-35-2 mg Tab Take 1 tablet by mouth 2 (two) times daily. 180 tablet 3    mirabegron (MYRBETRIQ) 50 mg Tb24 Take 1 tablet (50 mg total) by mouth once daily. 90 tablet 3    nitroGLYCERIN 0.4 MG/DOSE TL SPRY (NITROLINGUAL) 400 mcg/spray spray PLACE 1 SPRAY UNDER THE TONGUE EVERY 5 MINUTES AS NEEDED FOR CHEST PAIN 4.9 g 9    nystatin (MYCOSTATIN) powder Apply topically 2 (two) times daily. 60 g 11    omeprazole (PRILOSEC) 20 MG capsule Take 1 capsule (20 mg total) by mouth once daily. 90 capsule 3    prothrombin time/INR test metr Misc 1 Stick by Misc.(Non-Drug; Combo Route) route every 30 days. 1 each 0    traZODone (DESYREL) 50 MG tablet Take 1 tablet (50 mg total) by mouth every evening. 90 tablet 3    vit A/vit C/vit E/zinc/copper (PRESERVISION AREDS ORAL) Take by mouth 2 (two) times a day.       warfarin (COUMADIN) 5 MG tablet 5 mg except on wednesday and saturday Wed and sat 2.5 mgs (Patient taking differently: Take 2.5 mg by mouth Daily. 5 mg except on wednesday and saturday Wed and sat 2.5 mgs) 90 tablet 3       ROS: As per HPI and below:  Pertinent items are noted in HPI.      Physical Exam:     Vitals:    03/22/23 0736 03/22/23 0758 03/22/23 0853 03/22/23 1154   BP: 136/82  136/82 133/63   BP Location: Right arm   Right  "arm   Patient Position: Lying   Lying   Pulse: 70   65   Resp: 16   16   Temp: 97 °F (36.1 °C)   97.2 °F (36.2 °C)   TempSrc:       SpO2: 97% 95%  95%   Weight:       Height:           BP  Min: 129/60  Max: 186/95  Temp  Av.6 °F (36.4 °C)  Min: 97 °F (36.1 °C)  Max: 98.8 °F (37.1 °C)  Pulse  Av.1  Min: 62  Max: 92  Resp  Av.4  Min: 15  Max: 22  SpO2  Av %  Min: 93 %  Max: 99 %  Height  Av' 9" (175.3 cm)  Min: 5' 9" (175.3 cm)  Max: 5' 9" (175.3 cm)  Weight  Av kg (246 lb 14.6 oz)  Min: 111.2 kg (245 lb 2.4 oz)  Max: 113.4 kg (250 lb)    Body mass index is 36.2 kg/m².          General:             Well developed, well nourished, no apparent distress  HEENT:              NCAT, no JVD, mucous membranes moist, EOM intact  Cardiovascular:  Regular rate and rhythm, normal S1, normal S2, No murmurs, rubs, or gallops  Respiratory:        Normal breath sounds, no wheezes, no rales, no rhonchi  Abdomen:           Bowel sounds present, non tender, non distended, no masses, no hepatojugular reflux  Extremities:        No clubbing, no cyanosis, no edema  Vascular:            2+ b/l radial.  Peripheral pulses intact.  No carotid bruits.  Neurological:      No focal deficits  Skin:                   No obvious rashes or erythema, multiple lacerations x 3 of the face, sutures intact, multiple skin tears to the BL arms and legs.                                                                               Lab Results   Component Value Date    WBC 5.82 2023    HGB 8.4 (L) 2023    HCT 24.7 (L) 2023    MCV 98 2023     2023     Lab Results   Component Value Date    CHOL 112 03/10/2021    CHOL 114 2019    CHOL 113 (L) 2018     Lab Results   Component Value Date    HDL 49 03/10/2021    HDL 41 2019    HDL 42 2018     Lab Results   Component Value Date    LDLCALC 44 03/10/2021    LDLCALC 51 2019    LDLCALC 50.8 (L) 2018     Lab Results "   Component Value Date    TRIG 96 03/10/2021    TRIG 111 08/16/2019    TRIG 101 08/13/2018     Lab Results   Component Value Date    CHOLHDL 43.8 03/10/2021    CHOLHDL 36.0 08/16/2019    CHOLHDL 37.2 08/13/2018     CMP  Recent Labs   Lab 03/22/23  0514   GLU 96   CALCIUM 8.8      K 4.3   CO2 23      BUN 27*   CREATININE 1.3      Lab Results   Component Value Date    TSH 1.009 03/21/2023       Assessment and Recommendations       Diagnoses:    1. Skin tears to the BL arms and Legs (multiple)  2. Multiple lacerations of the forehead, nose, eyebrows    Plan:  1. Bactroban to the facial lacerations  2. Urgotul + foam to the skin tears  3. FU appointment at wound clinic on DC    Complexity:    medium

## 2023-03-22 NOTE — ASSESSMENT & PLAN NOTE
Neurosurgery consult-recommends posterior cervical decompression/fusion C3-T1.  Cardiology consulted for surgical clearance.

## 2023-03-22 NOTE — ASSESSMENT & PLAN NOTE
INR subtherapeutic at 1.7 today; daughter reports that he refused meds last night.  Hematology consulted for recommendations moving forward 2/2 pending surgical procedure.

## 2023-03-22 NOTE — PLAN OF CARE
Problem: Adult Inpatient Plan of Care  Goal: Plan of Care Review  Outcome: Ongoing, Progressing  Goal: Optimal Comfort and Wellbeing  Outcome: Ongoing, Progressing  Goal: Readiness for Transition of Care  Outcome: Ongoing, Progressing     Problem: Diabetes Comorbidity  Goal: Blood Glucose Level Within Targeted Range  Outcome: Ongoing, Progressing     Problem: Fluid Imbalance (Pneumonia)  Goal: Fluid Balance  Outcome: Ongoing, Progressing     Problem: Infection (Pneumonia)  Goal: Resolution of Infection Signs and Symptoms  Outcome: Ongoing, Progressing     Problem: Respiratory Compromise (Pneumonia)  Goal: Effective Oxygenation and Ventilation  Outcome: Ongoing, Progressing     Problem: Skin Injury Risk Increased  Goal: Skin Health and Integrity  Outcome: Ongoing, Progressing

## 2023-03-22 NOTE — NURSING
Pt is confused and refusing to wear cardiac monitor and home cpap machine. Pt also refused all PO medications. Explained the importance of all patient care. Pt continued to refuse care and became aggressive.Tele sitter placed in room. Bed alarm set. Plan of care continued as ordered.

## 2023-03-23 ENCOUNTER — CLINICAL SUPPORT (OUTPATIENT)
Dept: CARDIOLOGY | Facility: HOSPITAL | Age: 78
End: 2023-03-23
Attending: INTERNAL MEDICINE
Payer: MEDICARE

## 2023-03-23 ENCOUNTER — ANESTHESIA EVENT (OUTPATIENT)
Dept: SURGERY | Facility: HOSPITAL | Age: 78
DRG: 028 | End: 2023-03-23
Payer: MEDICARE

## 2023-03-23 LAB
ABO + RH BLD: NORMAL
ANION GAP SERPL CALC-SCNC: 6 MMOL/L (ref 8–16)
APTT BLDCRRT: 39.9 SEC (ref 21–32)
APTT BLDCRRT: 49.6 SEC (ref 21–32)
BASOPHILS # BLD AUTO: 0.02 K/UL (ref 0–0.2)
BASOPHILS NFR BLD: 0.3 % (ref 0–1.9)
BLD GP AB SCN CELLS X3 SERPL QL: NORMAL
BUN SERPL-MCNC: 27 MG/DL (ref 8–23)
CALCIUM SERPL-MCNC: 9 MG/DL (ref 8.7–10.5)
CHLORIDE SERPL-SCNC: 111 MMOL/L (ref 95–110)
CO2 SERPL-SCNC: 22 MMOL/L (ref 23–29)
CREAT SERPL-MCNC: 1.4 MG/DL (ref 0.5–1.4)
DIFFERENTIAL METHOD: ABNORMAL
EOSINOPHIL # BLD AUTO: 0.1 K/UL (ref 0–0.5)
EOSINOPHIL NFR BLD: 2 % (ref 0–8)
ERYTHROCYTE [DISTWIDTH] IN BLOOD BY AUTOMATED COUNT: 14.9 % (ref 11.5–14.5)
EST. GFR  (NO RACE VARIABLE): 52 ML/MIN/1.73 M^2
GLUCOSE SERPL-MCNC: 100 MG/DL (ref 70–110)
HCT VFR BLD AUTO: 23.6 % (ref 40–54)
HGB BLD-MCNC: 8.4 G/DL (ref 14–18)
IMM GRANULOCYTES # BLD AUTO: 0.05 K/UL (ref 0–0.04)
IMM GRANULOCYTES NFR BLD AUTO: 0.8 % (ref 0–0.5)
LYMPHOCYTES # BLD AUTO: 1.2 K/UL (ref 1–4.8)
LYMPHOCYTES NFR BLD: 19.3 % (ref 18–48)
MAGNESIUM SERPL-MCNC: 2.3 MG/DL (ref 1.6–2.6)
MCH RBC QN AUTO: 34.7 PG (ref 27–31)
MCHC RBC AUTO-ENTMCNC: 35.6 G/DL (ref 32–36)
MCV RBC AUTO: 98 FL (ref 82–98)
MONOCYTES # BLD AUTO: 0.5 K/UL (ref 0.3–1)
MONOCYTES NFR BLD: 7.5 % (ref 4–15)
NEUTROPHILS # BLD AUTO: 4.5 K/UL (ref 1.8–7.7)
NEUTROPHILS NFR BLD: 70.1 % (ref 38–73)
NRBC BLD-RTO: 0 /100 WBC
PLATELET # BLD AUTO: 186 K/UL (ref 150–450)
PMV BLD AUTO: 10.6 FL (ref 9.2–12.9)
POTASSIUM SERPL-SCNC: 4.4 MMOL/L (ref 3.5–5.1)
RBC # BLD AUTO: 2.42 M/UL (ref 4.6–6.2)
SODIUM SERPL-SCNC: 139 MMOL/L (ref 136–145)
SPECIMEN OUTDATE: NORMAL
WBC # BLD AUTO: 6.42 K/UL (ref 3.9–12.7)

## 2023-03-23 PROCEDURE — 99231 PR SUBSEQUENT HOSPITAL CARE,LEVL I: ICD-10-PCS | Mod: ,,, | Performed by: NURSE PRACTITIONER

## 2023-03-23 PROCEDURE — 36415 COLL VENOUS BLD VENIPUNCTURE: CPT | Performed by: STUDENT IN AN ORGANIZED HEALTH CARE EDUCATION/TRAINING PROGRAM

## 2023-03-23 PROCEDURE — 85730 THROMBOPLASTIN TIME PARTIAL: CPT | Performed by: NURSE PRACTITIONER

## 2023-03-23 PROCEDURE — 86900 BLOOD TYPING SEROLOGIC ABO: CPT | Performed by: STUDENT IN AN ORGANIZED HEALTH CARE EDUCATION/TRAINING PROGRAM

## 2023-03-23 PROCEDURE — 85730 THROMBOPLASTIN TIME PARTIAL: CPT | Mod: 91 | Performed by: STUDENT IN AN ORGANIZED HEALTH CARE EDUCATION/TRAINING PROGRAM

## 2023-03-23 PROCEDURE — 93018 NUCLEAR STRESS TEST (CUPID ONLY): ICD-10-PCS | Mod: ,,, | Performed by: INTERNAL MEDICINE

## 2023-03-23 PROCEDURE — 99900035 HC TECH TIME PER 15 MIN (STAT)

## 2023-03-23 PROCEDURE — 93016 NUCLEAR STRESS TEST (CUPID ONLY): ICD-10-PCS | Mod: ,,, | Performed by: INTERNAL MEDICINE

## 2023-03-23 PROCEDURE — 86920 COMPATIBILITY TEST SPIN: CPT | Performed by: ANESTHESIOLOGY

## 2023-03-23 PROCEDURE — 25000003 PHARM REV CODE 250: Performed by: NURSE PRACTITIONER

## 2023-03-23 PROCEDURE — 99231 SBSQ HOSP IP/OBS SF/LOW 25: CPT | Mod: ,,, | Performed by: NURSE PRACTITIONER

## 2023-03-23 PROCEDURE — 97530 THERAPEUTIC ACTIVITIES: CPT | Mod: CQ

## 2023-03-23 PROCEDURE — 36415 COLL VENOUS BLD VENIPUNCTURE: CPT | Performed by: NURSE PRACTITIONER

## 2023-03-23 PROCEDURE — 63600175 PHARM REV CODE 636 W HCPCS: Performed by: NURSE PRACTITIONER

## 2023-03-23 PROCEDURE — 93017 CV STRESS TEST TRACING ONLY: CPT

## 2023-03-23 PROCEDURE — 12000002 HC ACUTE/MED SURGE SEMI-PRIVATE ROOM

## 2023-03-23 PROCEDURE — 63600175 PHARM REV CODE 636 W HCPCS: Performed by: INTERNAL MEDICINE

## 2023-03-23 PROCEDURE — 94799 UNLISTED PULMONARY SVC/PX: CPT

## 2023-03-23 PROCEDURE — A4216 STERILE WATER/SALINE, 10 ML: HCPCS | Performed by: NURSE PRACTITIONER

## 2023-03-23 PROCEDURE — 80048 BASIC METABOLIC PNL TOTAL CA: CPT | Performed by: NURSE PRACTITIONER

## 2023-03-23 PROCEDURE — 94761 N-INVAS EAR/PLS OXIMETRY MLT: CPT

## 2023-03-23 PROCEDURE — 83735 ASSAY OF MAGNESIUM: CPT | Performed by: NURSE PRACTITIONER

## 2023-03-23 PROCEDURE — 85025 COMPLETE CBC W/AUTO DIFF WBC: CPT | Performed by: NURSE PRACTITIONER

## 2023-03-23 PROCEDURE — 93016 CV STRESS TEST SUPVJ ONLY: CPT | Mod: ,,, | Performed by: INTERNAL MEDICINE

## 2023-03-23 PROCEDURE — 93018 CV STRESS TEST I&R ONLY: CPT | Mod: ,,, | Performed by: INTERNAL MEDICINE

## 2023-03-23 RX ORDER — ALBUTEROL SULFATE 2.5 MG/.5ML
2.5 SOLUTION RESPIRATORY (INHALATION) EVERY 4 HOURS PRN
Status: DISCONTINUED | OUTPATIENT
Start: 2023-03-23 | End: 2023-03-28 | Stop reason: HOSPADM

## 2023-03-23 RX ORDER — MORPHINE SULFATE 2 MG/ML
2 INJECTION, SOLUTION INTRAMUSCULAR; INTRAVENOUS ONCE
Status: COMPLETED | OUTPATIENT
Start: 2023-03-23 | End: 2023-03-23

## 2023-03-23 RX ORDER — HYDROCODONE BITARTRATE AND ACETAMINOPHEN 500; 5 MG/1; MG/1
TABLET ORAL
Status: DISCONTINUED | OUTPATIENT
Start: 2023-03-23 | End: 2023-03-28 | Stop reason: HOSPADM

## 2023-03-23 RX ORDER — REGADENOSON 0.08 MG/ML
0.4 INJECTION, SOLUTION INTRAVENOUS
Status: COMPLETED | OUTPATIENT
Start: 2023-03-23 | End: 2023-03-23

## 2023-03-23 RX ORDER — IPRATROPIUM BROMIDE 0.5 MG/2.5ML
0.5 SOLUTION RESPIRATORY (INHALATION) EVERY 4 HOURS PRN
Status: DISCONTINUED | OUTPATIENT
Start: 2023-03-23 | End: 2023-03-28 | Stop reason: HOSPADM

## 2023-03-23 RX ADMIN — REGADENOSON 0.4 MG: 0.08 INJECTION, SOLUTION INTRAVENOUS at 12:03

## 2023-03-23 RX ADMIN — ALLOPURINOL 100 MG: 100 TABLET ORAL at 09:03

## 2023-03-23 RX ADMIN — CARVEDILOL 25 MG: 25 TABLET, FILM COATED ORAL at 04:03

## 2023-03-23 RX ADMIN — AMIODARONE HYDROCHLORIDE 200 MG: 200 TABLET ORAL at 09:03

## 2023-03-23 RX ADMIN — SODIUM CHLORIDE, PRESERVATIVE FREE 10 ML: 5 INJECTION INTRAVENOUS at 05:03

## 2023-03-23 RX ADMIN — FERROUS SULFATE TAB 325 MG (65 MG ELEMENTAL FE) 1 EACH: 325 (65 FE) TAB at 09:03

## 2023-03-23 RX ADMIN — CEFTRIAXONE 1 G: 1 INJECTION, POWDER, FOR SOLUTION INTRAMUSCULAR; INTRAVENOUS at 09:03

## 2023-03-23 RX ADMIN — TRAZODONE HYDROCHLORIDE 50 MG: 50 TABLET ORAL at 09:03

## 2023-03-23 RX ADMIN — MELATONIN TAB 3 MG 6 MG: 3 TAB at 09:03

## 2023-03-23 RX ADMIN — DOCUSATE SODIUM AND SENNOSIDES 1 TABLET: 8.6; 5 TABLET, FILM COATED ORAL at 09:03

## 2023-03-23 RX ADMIN — MORPHINE SULFATE 2 MG: 2 INJECTION, SOLUTION INTRAMUSCULAR; INTRAVENOUS at 04:03

## 2023-03-23 RX ADMIN — HEPARIN SODIUM 9 UNITS/KG/HR: 10000 INJECTION, SOLUTION INTRAVENOUS at 09:03

## 2023-03-23 RX ADMIN — LISINOPRIL 40 MG: 40 TABLET ORAL at 09:03

## 2023-03-23 RX ADMIN — MAGNESIUM SULFATE 2 G: 2 INJECTION INTRAVENOUS at 01:03

## 2023-03-23 RX ADMIN — HYDROCODONE BITARTRATE AND ACETAMINOPHEN 1 TABLET: 5; 325 TABLET ORAL at 03:03

## 2023-03-23 NOTE — ASSESSMENT & PLAN NOTE
Consult hematology for recommendations regarding anticoagulation-daughter prefers to avoid lovenox.  Placed on IV heparin drip-warfarin stopped

## 2023-03-23 NOTE — PROCEDURES
"Richard Bustos is a 77 y.o. male patient.    Temp: 97.5 °F (36.4 °C) (03/22/23 1543)  Pulse: 81 (03/22/23 1543)  Resp: 18 (03/22/23 1543)  BP: (!) 117/97 (03/22/23 1757)  SpO2: (!) 94 % (03/22/23 1543)  Weight: 111.1 kg (245 lb) (03/22/23 1336)  Height: 5' 9" (175.3 cm) (03/22/23 1336)    PICC  Date/Time: 3/22/2023 7:05 PM  Performed by: Chas Pelayo RN  Consent Done: Yes  Time out: Immediately prior to procedure a time out was called to verify the correct patient, procedure, equipment, support staff and site/side marked as required  Indications: med administration and vascular access  Anesthesia: local infiltration  Local anesthetic: lidocaine 1% without epinephrine  Anesthetic Total (mL): 2  Preparation: skin prepped with ChloraPrep  Skin prep agent dried: skin prep agent completely dried prior to procedure  Sterile barriers: all five maximum sterile barriers used - cap, mask, sterile gown, sterile gloves, and large sterile sheet  Hand hygiene: hand hygiene performed prior to central venous catheter insertion  Location details: right basilic  Catheter type: double lumen  Catheter size: 5 Fr  Catheter Length: 41cm    Ultrasound guidance: yes  Vessel Caliber: medium, compressibility normal  Vascular Doppler: not done  Needle advanced into vessel with real time Ultrasound guidance.  Guidewire confirmed in vessel.  Number of attempts: 1  Post-procedure: blood return through all ports, chlorhexidine patch and sterile dressing applied    Assessment: successful placement, placement verified by x-ray and tip termination        Name Chas Pelayo RN   3/22/2023    "

## 2023-03-23 NOTE — ASSESSMENT & PLAN NOTE
IV rocephin-completed 3 day course azithromycin  brochodilators prn  Supplemental oxygen as needed   Check procalcitonin-WNL  Sputum culture  Would benefit from IS

## 2023-03-23 NOTE — ASSESSMENT & PLAN NOTE
On heparin drip currently-surgical procedure tentatively planned for tomorrow.  Will hold heparin starting at midnight.

## 2023-03-23 NOTE — PROGRESS NOTES
[facility]  Department of Cardiology  Consult Note      PATIENT NAME: Richard Bustos  MRN: [id number]  TODAY'S DATE: 03/23/2023  ADMIT DATE: 3/20/2023                          CONSULT REQUESTED BY: Bill Gamino MD    SUBJECTIVE     PRINCIPAL PROBLEM: Pneumonia      REASON FOR CONSULT:  Surgical Clearance        INTERVAL HISTORY:    Patient has a history of CAD with PCI, and multiple co-morbidities. Patient to undergo Lexiscan Myoview today at [facility] prior to giving cardiac clearance for surgery.              HPI:    77 year old male patient followed by Dr. Familia Tate. He has a PMH significant for CAD history of  PCI in RCA in 2015, Anemia, CKD, PAF on coumadin, DM2, HTN, and Dyslipidemia. He tells me he has not had any chest pain or SOB. He fell on Friday after a trip and fall. He went to [facility] ED transferred to [facility] and was discharged home. He normally walks with a cane however since his fall he has been extremely weak. Neuro surgery has been consulted after review of CT/MRI. He would benefit from both cervical and lumbar surgery. We have been consulted to clear for surgery from cardiac standpoint. Stress test done in 2017 showed fixed defect no reversible ischemia.       FROM H AND P      Extremity Weakness       Pt had a fall Friday at his home and was seen at [facility], since being discharged pt reports weakness in both legs.              HPI: Richard Bustos is a 76 y/o M with a past medical history significant for anemia, CAD, CHF, CKD, atrial fibrillation on warfarin, DM2, HLD, HTN, and GERD who presented to the ED for further evaluation of BLE weakness which began following a fall 3 days ago.  Pt reports that he was walking down a ramp at home when he slipped due to the ramp being wet.  He fell to his knees, but continued the fall onto his face and head and had positive loss of consciousness.  He was initially seen at [facility] ED where he was transferred to [facility].  He reports an extensive workup with no  fractures seen and was ultimately discharged home.  He reports difficulty bearing weight since the fall and feels substantially weaker than his baseline.  He reports that he normally uses a cane for ambulation, but has not been able to ambulate at all since his fall 3 days ago.  Today in the ED, CT head is significant for frontal scalp hematoma with no acute intracranial injury seen; bilateral nasal bone fractures are noted.  CXR is concerning for pneumonia.  He will be placed in observation under the service of hospital medicine for continued monitoring and treatment.           Review of patient's allergies indicates:   Allergen Reactions    Donepezil Other (See Comments)     AMS    Bactroban [mupirocin calcium] Blisters     Causes Blisters    Shellfish containing products Other (See Comments)     Other reaction(s): Gout  OYSTERS       Past Medical History:   Diagnosis Date    Anemia     Anemia of other chronic disease 09/13/2017    Anemia, chronic renal failure 09/13/2017    Anemia, unspecified 09/13/2017    Anticoagulant long-term use     Aorta aneurysm     Arthritis     Atrial fibrillation     Bacteremia due to Streptococcus 01/08/2022    CAD (coronary artery disease)     CHF (congestive heart failure)     Chronic kidney disease     Clotting disorder 09/13/2017    Colon polyp     Dementia     Diabetes mellitus     not on meds    Diabetes mellitus type II     Diverticulosis     Elevated PSA     Encounter for blood transfusion     Former smoker     General anesthetics causing adverse effect in therapeutic use     TROUBLE COMING OUT OF ANESTHESIA WITH GASTRIC BYPASS    GERD (gastroesophageal reflux disease)     Gout     Hx of colonic polyps     Hypercoagulable state     Hyperlipidemia     Hypertension     MI, old 2010    MTHFR mutation     Myocardial infarction     9/2010    Osteomyelitis of ankle or foot 03/09/2017    Prostate cancer 06/2016    Sleep apnea     Squamous cell carcinoma 01/23/2018    Left helix,  imiquimod    Venous stasis     Chronic     Past Surgical History:   Procedure Laterality Date    ABDOMINAL SURGERY      CARDIAC SURGERY      3 STENTS     CAUDAL EPIDURAL STEROID INJECTION N/A 6/22/2020    Procedure: INJECTION, STEROID, SPINE, EPIDURAL, CAUDAL;  Surgeon: Evangelist Bose MD;  Location: [facility] OR;  Service: Pain Management;  Laterality: N/A;    COLONOSCOPY  02/2011    diverticulosis with diverticula with clot, no active bleeding    COLONOSCOPY N/A 8/24/2016    Procedure: COLONOSCOPY;  Surgeon: Saroj Borjas MD;  Location: [facility] ENDO;  Service: Endoscopy;  Laterality: N/A;    COLONOSCOPY N/A 11/18/2020    Procedure: COLONOSCOPY;  Surgeon: Saroj Borjas MD;  Location: [facility] ENDO;  Service: Endoscopy;  Laterality: N/A;    COLONOSCOPY N/A 10/27/2021    Procedure: COLONOSCOPY;  Surgeon: Saroj Borjas MD;  Location: [facility] ENDO;  Service: Endoscopy;  Laterality: N/A;    EPIDURAL STEROID INJECTION INTO LUMBAR SPINE N/A 6/22/2020    Procedure: Injection-steroid-epidural-lumbar;  Surgeon: Evangelist Bose MD;  Location: [facility] OR;  Service: Pain Management;  Laterality: N/A;  L3 L4 L5 S1    EPIDURAL STEROID INJECTION INTO LUMBAR SPINE N/A 9/21/2020    Procedure: Injection-steroid-epidural-lumbar;  Surgeon: Evangelist Bose MD;  Location: [facility] OR;  Service: Pain Management;  Laterality: N/A;  L5-S1    GASTRIC BYPASS  2/5/2008    IVC FILTER RETRIEVAL      JOINT REPLACEMENT  1996 and 2001    bi-lat hip replacement/Rt Hip and Lt Hip    RADIOFREQUENCY ABLATION OF LUMBAR MEDIAL BRANCH NERVE AT SINGLE LEVEL Bilateral 1/10/2020    Procedure: Radiofrequency Ablation, Nerve, Spinal, Lumbar, Medial Branch, 1 Level;  Surgeon: Evangelist Boes MD;  Location: [facility] OR;  Service: Pain Management;  Laterality: Bilateral;  L3,L4,L5    RADIOFREQUENCY ABLATION OF LUMBAR MEDIAL BRANCH NERVE AT SINGLE LEVEL Bilateral 11/23/2021    Procedure: Radiofrequency Ablation, Nerve, Spinal, Lumbar, Medial Branch, Bilateral L 3,4,5;   Surgeon: Nile Causey MD;  Location: [facility] OR;  Service: Pain Management;  Laterality: Bilateral;    ROTATOR CUFF REPAIR      Rt shoulder    Stents  8/18/2010    x 3    UPPER GASTROINTESTINAL ENDOSCOPY  02/2011     Social History     Tobacco Use    Smoking status: Former    Smokeless tobacco: Never    Tobacco comments:     47 yrs ago, only smoked 3-4 packs in his entire life as a teenager   Substance Use Topics    Alcohol use: Not Currently     Comment: rare    Drug use: No        REVIEW OF SYSTEMS  + leg weakness  Denies CP  Denies SOB  Normally gets around well with Cane prior to fall according to patient    OBJECTIVE     VITAL SIGNS (Most Recent)  Temp: 97.3 °F (36.3 °C) (03/23/23 0747)  Pulse: 72 (03/23/23 0802)  Resp: 16 (03/23/23 0802)  BP: (!) 164/77 (03/23/23 0747)  SpO2: 96 % (03/23/23 0802)    VENTILATION STATUS  Resp: 16 (03/23/23 0802)  SpO2: 96 % (03/23/23 0802)       I & O (Last 24H):  Intake/Output Summary (Last 24 hours) at 3/23/2023 0935  Last data filed at 3/22/2023 2309  Gross per 24 hour   Intake 840 ml   Output 350 ml   Net 490 ml       WEIGHTS  Wt Readings from Last 3 Encounters:   03/22/23 1336 111.1 kg (245 lb)   03/20/23 2013 111.2 kg (245 lb 2.4 oz)   03/20/23 1927 111.4 kg (245 lb 9.5 oz)   03/20/23 1342 113.4 kg (250 lb)   03/23/23 0908 111.1 kg (244 lb 14.9 oz)   03/17/23 1651 113.4 kg (250 lb)       PHYSICAL EXAM  GENERAL: Adult male in no distress. Bruising through out entire face, and neck .  HEENT: Bruising through out  NECK: No JVD. No bruit..   THYROID: Thyroid not enlarged. No nodules present..   CARDIAC: Regular rate and rhythm. S1 is normal.S2 is normal.  CHEST ANATOMY: normal.   LUNGS: Clear to auscultation. Diminished to bases  ABDOMEN: Soft no masses or organomegaly.  No abdomen pulsations or bruits.  Normal bowel sounds. No pulsations and no masses felt, No guarding or rebound.   URINARY: No mcdaniels catheter   EXTREMITIES: Cream and bandages noted  CENTRAL NERVOUS SYSTEM: No  focal motor or sensory deficits noted.       HOME MEDICATIONS:  Current Facility-Administered Medications on File Prior to Encounter   Medication Dose Route Frequency Provider Last Rate Last Admin    lactated ringers infusion   Intravenous Once PRN Evangelist Bose MD         Current Outpatient Medications on File Prior to Encounter   Medication Sig Dispense Refill    aspirin (ECOTRIN) 81 MG EC tablet Take 81 mg by mouth once daily.      allopurinoL (ZYLOPRIM) 100 MG tablet Take 1 tablet (100 mg total) by mouth every evening. 90 tablet 3    atorvastatin (LIPITOR) 80 MG tablet Take 1 tablet (80 mg total) by mouth once daily. 90 tablet 3    calcitRIOL (ROCALTROL) 0.5 MCG Cap 0.75 mcg once daily.   4    carvediloL (COREG) 25 MG tablet Take 1 tablet (25 mg total) by mouth 2 (two) times daily with meals. 180 tablet 3    clopidogreL (PLAVIX) 75 mg tablet Take 1 tablet (75 mg total) by mouth once daily. 90 tablet 3    famotidine (PEPCID) 40 MG tablet Take 1 tablet (40 mg total) by mouth once daily. 90 tablet 3    ferrous sulfate (FEROSUL) 325 mg (65 mg iron) Tab tablet TAKE 1 TABLET BY MOUTH TWICE DAILY 180 tablet 3    fluticasone propionate (FLONASE) 50 mcg/actuation nasal spray SHAKE LIQUID AND USE 2 SPRAYS(100 MCG) IN EACH NOSTRIL EVERY DAY 16 g 5    FOLIC ACID/MULTIVIT-MIN/LUTEIN (CENTRUM SILVER ORAL) Take 1 tablet by mouth once daily.      HYDROcodone-acetaminophen (NORCO) 5-325 mg per tablet Take 1 tablet by mouth every 8 (eight) hours as needed for Pain. 90 tablet 0    LIDOcaine (LIDODERM) 5 % Place 1 patch onto the skin once daily. Remove & Discard patch within 12 hours or as directed by MD 15 patch 0    lisinopriL (PRINIVIL,ZESTRIL) 40 MG tablet Take 1 tablet (40 mg total) by mouth every evening. 90 tablet 3    magnesium oxide (MAG-OX) 400 mg (241.3 mg magnesium) tablet Take 1 tablet (400 mg total) by mouth once daily. 90 tablet 3    mecobal-levomefolat Ca-B6 phos (L-METHYL-B6-B12) 3-35-2 mg Tab Take 1 tablet by  mouth 2 (two) times daily. 180 tablet 3    mirabegron (MYRBETRIQ) 50 mg Tb24 Take 1 tablet (50 mg total) by mouth once daily. 90 tablet 3    nitroGLYCERIN 0.4 MG/DOSE TL SPRY (NITROLINGUAL) 400 mcg/spray spray PLACE 1 SPRAY UNDER THE TONGUE EVERY 5 MINUTES AS NEEDED FOR CHEST PAIN 4.9 g 9    nystatin (MYCOSTATIN) powder Apply topically 2 (two) times daily. 60 g 11    omeprazole (PRILOSEC) 20 MG capsule Take 1 capsule (20 mg total) by mouth once daily. 90 capsule 3    prothrombin time/INR test metr Misc 1 Stick by Misc.(Non-Drug; Combo Route) route every 30 days. 1 each 0    traZODone (DESYREL) 50 MG tablet Take 1 tablet (50 mg total) by mouth every evening. 90 tablet 3    vit A/vit C/vit E/zinc/copper (PRESERVISION AREDS ORAL) Take by mouth 2 (two) times a day.       warfarin (COUMADIN) 5 MG tablet 5 mg except on wednesday and saturday Wed and sat 2.5 mgs (Patient taking differently: Take 2.5 mg by mouth Daily. 5 mg except on wednesday and saturday Wed and sat 2.5 mgs) 90 tablet 3       SCHEDULED MEDS:   allopurinoL  100 mg Oral QHS    amiodarone  200 mg Oral BID    calcitRIOL  0.75 mcg Oral Daily    carvediloL  25 mg Oral BID WM    cefTRIAXone (ROCEPHIN) IVPB  1 g Intravenous Q24H    famotidine  40 mg Oral Daily    ferrous sulfate  1 tablet Oral BID    lisinopriL  40 mg Oral QHS    senna-docusate 8.6-50 mg  1 tablet Oral BID    sodium chloride 0.9%  10 mL Intravenous Q6H    traZODone  50 mg Oral QHS       CONTINUOUS INFUSIONS:   heparin (porcine) in D5W 9 Units/kg/hr (03/23/23 0014)       PRN MEDS:acetaminophen, albuterol sulfate, aluminum-magnesium hydroxide-simethicone, dextrose 10%, dextrose 10%, dextrose, dextrose, fluticasone propionate, glucagon (human recombinant), heparin (PORCINE), heparin (PORCINE), HYDROcodone-acetaminophen, ipratropium, melatonin, naloxone, ondansetron, simethicone, sodium chloride 0.9%, Flushing PICC Protocol **AND** sodium chloride 0.9% **AND** sodium chloride 0.9%    LABS AND  DIAGNOSTICS     CBC LAST 3 DAYS  Recent Labs   Lab 03/22/23  0514 03/22/23  1411 03/23/23  0516   WBC 5.82 6.03 6.42   RBC 2.51* 2.57* 2.42*   HGB 8.4* 8.7* 8.4*   HCT 24.7* 25.1* 23.6*   MCV 98 98 98   MCH 33.5* 33.9* 34.7*   MCHC 34.0 34.7 35.6   RDW 14.9* 14.9* 14.9*    195 186   MPV 10.5 10.6 10.6   GRAN 70.3  4.1 72.1  4.4 70.1  4.5   LYMPH 19.6  1.1 17.6*  1.1 19.3  1.2   MONO 7.0  0.4 7.3  0.4 7.5  0.5   BASO 0.02 0.02 0.02   NRBC 0 0 0       COAGULATION LAST 3 DAYS  Recent Labs   Lab 03/21/23  0505 03/22/23  0514 03/22/23  1411 03/22/23  2222 03/23/23  0516   INR 2.5* 1.7* 1.5*  --   --    APTT  --   --  31.1 82.0* 49.6*       CHEMISTRY LAST 3 DAYS  Recent Labs   Lab 03/17/23  1812 03/20/23  1418 03/21/23  0505 03/22/23  0514 03/23/23  0516    138 139 140 139   K 4.7 4.6 4.7 4.3 4.4   * 113* 113* 110 111*   CO2 21* 20* 22* 23 22*   ANIONGAP 6* 5* 4* 7* 6*   BUN 32* 32* 28* 27* 27*   CREATININE 1.6* 1.4 1.3 1.3 1.4   * 171* 112* 96 100   CALCIUM 8.9 8.7 8.6* 8.8 9.0   MG  --   --  1.7 1.6 2.3   ALBUMIN 2.7* 2.4*  --   --   --    PROT 5.7* 5.6*  --   --   --    ALKPHOS 89 91  --   --   --    ALT 22 20  --   --   --    AST 33 46*  --   --   --    BILITOT 0.6 0.5  --   --   --        CARDIAC PROFILE LAST 3 DAYS  Recent Labs   Lab 03/20/23  1418 03/22/23  0514   * 170       ENDOCRINE LAST 3 DAYS  Recent Labs   Lab 03/20/23  1418 03/21/23  0505   TSH  --  1.009   PROCAL 0.10  --        LAST ARTERIAL BLOOD GAS  ABG  No results for input(s): PH, PO2, PCO2, HCO3, BE in the last 168 hours.    LAST 7 DAYS MICROBIOLOGY   Microbiology Results (last 7 days)       ** No results found for the last 168 hours. **            MOST RECENT IMAGING  X-Ray Chest 1 View for Line/Tube Placement  Narrative: EXAMINATION:  XR CHEST 1 VIEW FOR LINE/TUBE PLACEMENT    CLINICAL HISTORY:  PICC insertion;    TECHNIQUE:  Single frontal portable view of the chest was  performed.    COMPARISON:  03/20/2023.    FINDINGS:  There is a right upper extremity PICC which terminates in the high SVC.  The lungs are well expanded and clear.  No focal opacities are seen.  The pleural spaces are clear.  The cardiac silhouette is enlarged unchanged.  Visualized osseous structures are intact.  Impression: Right-sided PICC as above.    Electronically signed by: Bandar Nuñez  Date:    03/22/2023  Time:    19:51  Echo  · The left ventricle is normal in size with mildly decreased systolic   function.  · Left ventricular diastolic dysfunction.  · The estimated ejection fraction is 45%.  · There is mild left ventricular global hypokinesis.  · Normal right ventricular size with normal right ventricular systolic   function.  · The aortic root is mildly dilated.  · The ascending aorta is dilated.  · Trivial posterior pericardial effusion.  · Mild aortic regurgitation.  · Mild tricuspid regurgitation.  · Normal central venous pressure (3 mmHg).         ECHOCARDIOGRAM RESULTS (last 5)  Results for orders placed during the hospital encounter of 10/22/21    Echo    Interpretation Summary  · The left ventricle is mildly enlarged with mild concentric hypertrophy and mildly decreased systolic function.  · The estimated ejection fraction is 40%.  · Grade I left ventricular diastolic dysfunction.  · There are segmental left ventricular wall motion abnormalities. There is mid-inferior dyskinesis  · Mild tricuspid regurgitation.  · Severe left atrial enlargement.  · Mild right ventricular enlargement with normal right ventricular systolic function.      Results for orders placed during the hospital encounter of 05/26/20    Echo Color Flow Doppler? Yes    Interpretation Summary  · Mild concentric left ventricular hypertrophy.  · Moderately decreased left ventricular systolic function. The estimated ejection fraction is 40%.  · Grade I (mild) left ventricular diastolic dysfunction consistent with impaired  relaxation.  · Local segmental wall motion abnormalities. There is inferobasilar dyskinesis.  · Mild left ventricular enlargement.  · Mild right ventricular enlargement.  · Mild right atrial enlargement.  · Mild aortic regurgitation.  · Mild tricuspid regurgitation.      CURRENT/PREVIOUS VISIT EKG  Results for orders placed or performed during the hospital encounter of 03/20/23   EKG 12-lead    Collection Time: 03/22/23  1:32 PM    Narrative    Test Reason : Z01.818,    Vent. Rate : 064 BPM     Atrial Rate : 068 BPM     P-R Int : 000 ms          QRS Dur : 150 ms      QT Int : 432 ms       P-R-T Axes : 000 000 -17 degrees     QTc Int : 445 ms    Atrial fibrillation with premature ventricular or aberrantly conducted  complexes  Right bundle branch block  Abnormal ECG  When compared with ECG of 20-MAR-2023 14:10,  Previous ECG has undetermined rhythm, needs review    Referred By: TONY   SELF           Confirmed By:            ASSESSMENT/PLAN:     Active Hospital Problems    Diagnosis    *Pneumonia    Spinal cord compression    Closed nondisplaced fracture of sixth cervical vertebra    Warfarin anticoagulation    Chronic systolic congestive heart failure    Anemia due to stage 3 chronic kidney disease    Generalized weakness    Stasis dermatitis of both legs    Paroxysmal atrial fibrillation     EKG stable  Stable on Coreg, no missed doses of anticoagulation. Continue anticoagulation as described below      CKD (chronic kidney disease) stage 3, GFR 30-59 ml/min, 41    GERD (gastroesophageal reflux disease)    Diabetes mellitus with chronic kidney disease, stage III    Hypertension associated with diabetes    MTHFR mutation (methylenetetrahydrofolate reductase)       ASSESSMENT & PLAN:       Closed Displaced fracture of C6  L4-5 with severe right moderate to severe left neural foraminal narrowing and severe spinal canal stenosis.  PAF-On coumadin-currently in NSR  CAD H/P RCA PCI IN 2015  H/O Mild LV Dysfunction in  the past  DM  CKD  Stasis dermatitis  GERD  Generalized weakness after mechanical fall secondary to #1 and #2  Anemia  Hypomagnesemia      RECOMMENDATIONS:    Stress Test-   IMPRESSION:  1. No confluent evidence of pharmacologically induced reversible ischemia.  2. Normal left ventricular wall motion.  3. Calculated ejection fraction of 53%.     Stress is negative for reversible ischemia. Patient is an appropriate cardiac risk for the planned procedure    Adeline Dewitt NP  Department of Cardiology  Date of Service: 03/23/2023

## 2023-03-23 NOTE — ASSESSMENT & PLAN NOTE
Neurosurgery consult-recommends posterior cervical decompression/fusion C3-T1.  Cardiology consulted for surgical clearance-stress test today

## 2023-03-23 NOTE — PT/OT/SLP PROGRESS
Physical Therapy Treatment    Patient Name:  Richard Bustos   MRN:  [id number]    Recommendations:     Discharge Recommendations: nursing facility, skilled  Discharge Equipment Recommendations: none  Barriers to discharge: None    Assessment:     Richard Bustos is a 77 y.o. male admitted with a medical diagnosis of Pneumonia.  He presents with the following impairments/functional limitations: weakness, impaired endurance, impaired self care skills, impaired functional mobility, gait instability, impaired balance, decreased upper extremity function, decreased lower extremity function, pain, impaired skin, orthopedic precautions . Awake, alert, supine in bed. Agreed to mobilize and mentioned having to have a test done today. Explained use of c- collar ( applied prior to standing ), shoes applied also.  Required Max A to transfer EOB as well as scoot forward to feet flat and extra time for sitting balance. Performed 2 standing trials with rw and A x 2 , attempted SPT but unsuccessful due to LE weakness and decreased standing balance returning to sit between each. Max A to scoot HOB L side . Sit > supine with A x 2 ( collar removed). Total A to scoot HOB. Bed mobility and tolerance for activity improved, appears eager to progress.  No indication of increased pain noted with activity .     Rehab Prognosis: Fair; patient would benefit from acute skilled PT services to address these deficits and reach maximum level of function.    Recent Surgery: Procedure(s) (LRB):  FUSION, SPINE, CERVICAL (N/A)      Plan:     During this hospitalization, patient to be seen 6 x/week to address the identified rehab impairments via gait training, therapeutic activities, therapeutic exercises, neuromuscular re-education and progress toward the following goals:    Plan of Care Expires:  04/30/23    Subjective     Chief Complaint: neck and back pain , but tolerated activity.  Patient/Family Comments/goals: to be able to walk  Pain/Comfort:  Pain  Rating 1: other (see comments) (did not rate)  Location - Orientation 1: posterior  Location 1: neck (and back)  Pain Addressed 1: Reposition, Nurse notified      Objective:     Communicated with nurse Armendariz prior to session.  Patient found supine with bed alarm, telemetry, peripheral IV upon PT entry to room.     General Precautions: Standard, fall  Orthopedic Precautions: spinal precautions  Braces: Cervical collar (collar needed when up .)  Respiratory Status: Room air     Functional Mobility:  Bed Mobility:     Rolling Left:  moderate assistance  Rolling Right: moderate assistance  Supine to Sit: maximal assistance  Sit to Supine: maximal assistance  Transfers:     Sit to Stand:  maximal assistance and of 2 persons with rolling walker      AM-PAC 6 CLICK MOBILITY          Treatment & Education:  Transferred EOB with Max A.  Sit<> stand x 2 trials with rw and A x 2.   Sit > supine with A x 2.  Scoot HOB with total A.      Patient left HOB elevated with all lines intact, call button in reach, bed alarm on, and nurse Marcello notified..    GOALS:   Multidisciplinary Problems       Physical Therapy Goals          Problem: Physical Therapy    Goal Priority Disciplines Outcome Goal Variances Interventions   Physical Therapy Goal     PT, PT/OT Ongoing, Progressing     Description: Goals to be met by: 2023     Patient will increase functional independence with mobility by performin. Supine to sit with MInimal Assistance  2. Sit to stand transfer with Moderate Assistance  3. Bed to chair transfer with Maximum Assistance using Rolling Walker  4. Gait  x 10 feet with Maximum Assistance using Rolling Walker.   5. Sitting at edge of bed x20 minutes with Minimal Assistance  3. Lower extremity exercise program x20 reps                        Time Tracking:     PT Received On: 23  PT Start Time: 08     PT Stop Time: 09  PT Total Time (min): 27 min     Billable Minutes: Therapeutic Activity 27min    Treatment  Type: Treatment  PT/PTA: PTA     Number of PTA visits since last PT visit: 2     03/23/2023

## 2023-03-23 NOTE — ASSESSMENT & PLAN NOTE
Chronic, controlled.  Latest blood pressure and vitals reviewed-   Temp:  [97.2 °F (36.2 °C)-97.7 °F (36.5 °C)]   Pulse:  [60-81]   Resp:  [16-18]   BP: (117-164)/(63-97)   SpO2:  [94 %-96 %] .   Home meds for hypertension were reviewed and noted below.   Hypertension Medications             carvediloL (COREG) 25 MG tablet Take 1 tablet (25 mg total) by mouth 2 (two) times daily with meals.    lisinopriL (PRINIVIL,ZESTRIL) 40 MG tablet Take 1 tablet (40 mg total) by mouth every evening.    nitroGLYCERIN 0.4 MG/DOSE TL SPRY (NITROLINGUAL) 400 mcg/spray spray PLACE 1 SPRAY UNDER THE TONGUE EVERY 5 MINUTES AS NEEDED FOR CHEST PAIN          While in the hospital, will manage blood pressure as follows; Adjust home antihypertensive regimen as follows- continue chronic beta blocker; will hold ACE for now due to recent EMMY.  Monitor creatinine and resume lisinopril when proven stable.-resume nightly lisinopril and continue to monitor.    Will utilize p.r.n. blood pressure medication only if patient's blood pressure greater than  180/110 and he develops symptoms such as worsening chest pain or shortness of breath.

## 2023-03-23 NOTE — PROGRESS NOTES
[facility] Hospital Medicine  Progress Note    Patient Name: Richard Bustos  MRN: [id number]  Patient Class: IP- Inpatient   Admission Date: 3/20/2023  Length of Stay: 1 days  Attending Physician: Bill Gamino MD  Primary Care Provider: Familia Ramirez Jr, MD        Subjective:     Principal Problem:Pneumonia        HPI:  Richard Bustos is a 78 y/o M with a past medical history significant for anemia, CAD, CHF, CKD, atrial fibrillation on warfarin, DM2, HLD, HTN, and GERD who presented to the ED for further evaluation of BLE weakness which began following a fall 3 days ago.  Pt reports that he was walking down a ramp at home when he slipped due to the ramp being wet.  He fell to his knees, but continued the fall onto his face and head and had positive loss of consciousness.  He was initially seen at [facility] ED where he was transferred to [facility].  He reports an extensive workup with no fractures seen and was ultimately discharged home.  He reports difficulty bearing weight since the fall and feels substantially weaker than his baseline.  He reports that he normally uses a cane for ambulation, but has not been able to ambulate at all since his fall 3 days ago.  Today in the ED, CT head is significant for frontal scalp hematoma with no acute intracranial injury seen; bilateral nasal bone fractures are noted.  CXR is concerning for pneumonia.  He will be placed in observation under the service of hospital medicine for continued monitoring and treatment.      Overview/Hospital Course:  Richard Bustos was monitored closely during his hospitalization.  He was placed on IV rocephin and IV azithromycin in treatment of pneumonia.  An xray of his right shoulder was performed due to continued c/o pain and decreased ROM following his fall.  PT/OT were consulted.  An MRI of the brain was obtained with no acute findings.  MRI cervical spine concerning for C6 fractures, neural foraminal and spinal canal stenosis, severe  cansl stenosis cord compression C5-6 and mild to moderate cord flattening at C3-4, and C4-5, and other findings compatible with possible trauma as correlates with given history.  MRI lumbar spine showed multilevel degenerative changes, spinal canal stenosis and neural foraminal stenosis.  It was the recommendation of the neurosurgeon that pt undergo posterior cervical decompression/fusion C3-T1 with lumbar surgery to follow at a later date.  Due to the complexity of his health history, cardiology and hematology were consulted.  ASA and plavix were held.       Interval History: plan to transfer to [facility] for stress test today and return to [facility] per cardiology recommendations.  Tentatively planned for cervical fusion tomorrow with Dr. Kc.  He seems more alert this morning and is coherent.  He is able to discuss his plan of care and seems to understand.    Review of Systems   Constitutional:  Positive for fatigue. Negative for chills and fever.   HENT:  Negative for congestion.    Respiratory:  Negative for chest tightness and shortness of breath.    Cardiovascular:  Negative for chest pain and palpitations.   Gastrointestinal:  Negative for abdominal pain, diarrhea, nausea and vomiting.   Genitourinary:  Negative for dysuria and hematuria.   Musculoskeletal:  Positive for arthralgias, gait problem and myalgias.   Skin:  Positive for color change and wound.   Neurological:  Positive for weakness. Negative for headaches.   Hematological:  Bruises/bleeds easily.   Psychiatric/Behavioral:  Negative for agitation and confusion.    All other systems reviewed and are negative.  Objective:     Vital Signs (Most Recent):  Temp: 97.3 °F (36.3 °C) (03/23/23 0747)  Pulse: 72 (03/23/23 0802)  Resp: 16 (03/23/23 0802)  BP: (!) 164/77 (03/23/23 0747)  SpO2: 96 % (03/23/23 0802)   Vital Signs (24h Range):  Temp:  [97.2 °F (36.2 °C)-97.7 °F (36.5 °C)] 97.3 °F (36.3 °C)  Pulse:  [60-81] 72  Resp:  [16-18] 16  SpO2:  [94 %-96  %] 96 %  BP: (117-164)/(63-97) 164/77     Weight: 111.1 kg (245 lb)  Body mass index is 36.18 kg/m².    Intake/Output Summary (Last 24 hours) at 3/23/2023 1058  Last data filed at 3/22/2023 2309  Gross per 24 hour   Intake 840 ml   Output 350 ml   Net 490 ml      Physical Exam  Constitutional:       General: He is not in acute distress.     Appearance: He is well-developed. He is ill-appearing.   HENT:      Head: Contusion (forehead) and laceration (right side of nose) present.   Eyes:      Conjunctiva/sclera: Conjunctivae normal.      Pupils: Pupils are equal, round, and reactive to light.   Cardiovascular:      Rate and Rhythm: Normal rate and regular rhythm.      Pulses: Normal pulses.      Heart sounds: Murmur heard.   Pulmonary:      Effort: Pulmonary effort is normal.      Breath sounds: Normal breath sounds.   Abdominal:      General: Bowel sounds are normal.      Palpations: Abdomen is soft.   Musculoskeletal:         General: Tenderness (right shoulder) present.      Cervical back: Normal range of motion and neck supple.      Right lower leg: Edema present.      Left lower leg: Edema present.      Comments: Decreased ROM R shoulder   Skin:     General: Skin is warm and dry.      Findings: Bruising and rash (chronic skin changes to BLE) present.      Comments: Diffuse ecchymosis face and neck   Neurological:      General: No focal deficit present.      Mental Status: He is alert and oriented to person, place, and time.      Comments: Orientation much improved today     Psychiatric:         Mood and Affect: Mood is depressed.         Behavior: Behavior normal.       Significant Labs: All pertinent labs within the past 24 hours have been reviewed.  CBC:   Recent Labs   Lab 03/22/23  0514 03/22/23  1411 03/23/23  0516   WBC 5.82 6.03 6.42   HGB 8.4* 8.7* 8.4*   HCT 24.7* 25.1* 23.6*    195 186     CMP:   Recent Labs   Lab 03/22/23  0514 03/23/23  0516    139   K 4.3 4.4    111*   CO2 23 22*    GLU 96 100   BUN 27* 27*   CREATININE 1.3 1.4   CALCIUM 8.8 9.0   ANIONGAP 7* 6*       Significant Imaging: I have reviewed all pertinent imaging results/findings within the past 24 hours.      Assessment/Plan:      * Pneumonia  IV rocephin-completed 3 day course azithromycin  brochodilators prn  Supplemental oxygen as needed   Check procalcitonin-WNL  Sputum culture  Would benefit from IS      Spinal cord compression  Neurosurgery consult-recommends posterior cervical decompression/fusion C3-T1.  Cardiology consulted for surgical clearance-stress test today      Closed nondisplaced fracture of sixth cervical vertebra  MRI brain/cervical spine/lumbar spine-reviewed  Consult neurosurgery      Warfarin anticoagulation  On heparin drip currently-surgical procedure tentatively planned for tomorrow.  Will hold heparin starting at midnight.      Chronic systolic congestive heart failure  Patient is identified as having Systolic (HFrEF) heart failure that is Chronic. CHF is currently controlled. Latest ECHO performed and demonstrates- Results for orders placed during the hospital encounter of 10/22/21    Echo    Interpretation Summary  · The left ventricle is mildly enlarged with mild concentric hypertrophy and mildly decreased systolic function.  · The estimated ejection fraction is 40%.  · Grade I left ventricular diastolic dysfunction.  · There are segmental left ventricular wall motion abnormalities. There is mid-inferior dyskinesis  · Mild tricuspid regurgitation.  · Severe left atrial enlargement.  · Mild right ventricular enlargement with normal right ventricular systolic function.  . Continue Beta Blocker and monitor clinical status closely. Monitor on telemetry. Patient is off CHF pathway.  Monitor strict Is&Os and daily weights.  Continue to stress to patient importance of self efficacy and  on diet for CHF. Last BNP reviewed- and noted below No results for input(s): BNP, BNPTRIAGEBLO in the last 168  hours..      Anemia due to stage 3 chronic kidney disease  Patient's anemia is currently controlled. S/p 0 units of PRBCs.  Current CBC reviewed-   Lab Results   Component Value Date    HGB 8.4 (L) 03/22/2023    HCT 24.7 (L) 03/22/2023    MCV 98 03/22/2023     03/22/2023     Monitor serial CBC and transfuse if patient becomes hemodynamically unstable, symptomatic or H/H drops below 7/21.         Generalized weakness  Etiology unclear.  Check CPK-mildly elevated.  Fluids given overnight-trend; now WNL.     PT/OT consult  Maintain fall precautions  Consult neurology/neurosurgery        Stasis dermatitis of both legs  chronic      Paroxysmal atrial fibrillation  Patient with Long standing persistent (>12 months) atrial fibrillation which is controlled currently with Beta Blocker. Patient is currently in atrial fibrillation.[id number] Score: 4.  Anticoagulation indicated. Anticoagulation done with warfarin chronically; currently on heparin drip.        CKD (chronic kidney disease) stage 3, GFR 30-59 ml/min, 41  Creatine stable for now. BMP reviewed- noted Estimated Creatinine Clearance: 56 mL/min (based on SCr of 1.4 mg/dL). according to latest data. Monitor UOP and serial BMP and adjust therapy as needed. Renally dose meds.          GERD (gastroesophageal reflux disease)  Chronic; continue home pepcid      CAD (coronary artery disease)  Cardiology consulted for cardiac clearance-stress test today.      Hypertension associated with diabetes  Chronic, controlled.  Latest blood pressure and vitals reviewed-   Temp:  [97.2 °F (36.2 °C)-97.7 °F (36.5 °C)]   Pulse:  [60-81]   Resp:  [16-18]   BP: (117-164)/(63-97)   SpO2:  [94 %-96 %] .   Home meds for hypertension were reviewed and noted below.   Hypertension Medications             carvediloL (COREG) 25 MG tablet Take 1 tablet (25 mg total) by mouth 2 (two) times daily with meals.    lisinopriL (PRINIVIL,ZESTRIL) 40 MG tablet Take 1 tablet (40 mg total) by mouth  every evening.    nitroGLYCERIN 0.4 MG/DOSE TL SPRY (NITROLINGUAL) 400 mcg/spray spray PLACE 1 SPRAY UNDER THE TONGUE EVERY 5 MINUTES AS NEEDED FOR CHEST PAIN          While in the hospital, will manage blood pressure as follows; Adjust home antihypertensive regimen as follows- continue chronic beta blocker; will hold ACE for now due to recent EMMY.  Monitor creatinine and resume lisinopril when proven stable.-resume nightly lisinopril and continue to monitor.    Will utilize p.r.n. blood pressure medication only if patient's blood pressure greater than  180/110 and he develops symptoms such as worsening chest pain or shortness of breath.      MTHFR mutation (methylenetetrahydrofolate reductase)  Consult hematology for recommendations regarding anticoagulation-daughter prefers to avoid lovenox.  Placed on IV heparin drip-warfarin stopped      Diabetes mellitus with chronic kidney disease, stage III  Not on chronic meds since bariatric surgery.  Monitor daily glucose-has been controlled.      VTE Risk Mitigation (From admission, onward)         Ordered     [MAR Hold - Suspended Admission]  heparin 25,000 units in dextrose 5% (100 units/ml) IV bolus from bag - ADDITIONAL PRN BOLUS - 60 units/kg  As needed (PRN)        (MAR Hold at [facility] since Thu 3/23/2023 at 1016.Hold Reason: Patient on Leave of Absence)   Question:  Heparin Infusion Adjustment (DO NOT MODIFY ANSWER)  Answer:  [email or web address]\epic\Images\Pharmacy\HeparinInfusions\heparin LOW INTENSITY nomogram for [facility] [id number].pdf    03/22/23 1244     [MAR Hold - Suspended Admission]  heparin 25,000 units in dextrose 5% (100 units/ml) IV bolus from bag - ADDITIONAL PRN BOLUS - 30 units/kg  As needed (PRN)        (MAR Hold at [facility] since Thu 3/23/2023 at 1016.Hold Reason: Patient on Leave of Absence)   Question:  Heparin Infusion Adjustment (DO NOT MODIFY ANSWER)  Answer:   \\[email or web address]\epic\Images\Pharmacy\HeparinInfusions\heparin LOW INTENSITY nomogram for [facility] [id number].pdf    03/22/23 1244     [MAR Hold - Suspended Admission]  heparin 25,000 units in dextrose 5% 250 mL (100 units/mL) infusion LOW INTENSITY nomogram - [facility]  Continuous        (MAR Hold at [facility] since Thu 3/23/2023 at 1016.Hold Reason: Patient on Leave of Absence)   Question Answer Comment   Heparin Infusion Adjustment (DO NOT MODIFY ANSWER) \\[facility] [email or web address]\epic\Images\Pharmacy\HeparinInfusions\heparin LOW INTENSITY nomogram for [facility] [id number].pdf    Begin at (in units/kg/hr) 12        03/22/23 1244     [Order Hold - Suspended Admission]  IP VTE HIGH RISK PATIENT  Once        (On hold at [facility] since 03/23/23 1015)    03/20/23 1929     [Order Hold - Suspended Admission]  Place sequential compression device  Until discontinued        (On hold at [facility] since 03/23/23 1015)    03/20/23 1929     [Order Hold - Suspended Admission]  Reason for No Pharmacological VTE Prophylaxis  Once        (On hold at [facility] since 03/23/23 1015)   Question:  Reasons:  Answer:  Already adequately anticoagulated on oral Anticoagulants    03/20/23 1929                Discharge Planning   ORACIO: 3/22/2023     Code Status: Full Code   Is the patient medically ready for discharge?:     Reason for patient still in hospital (select all that apply): Patient trending condition, Treatment, Consult recommendations, PT / OT recommendations and Pending disposition  Discharge Plan A: Skilled Nursing Facility                  RICK Nassar  Department of Hospital Medicine   [facility]

## 2023-03-23 NOTE — PLAN OF CARE
[facility] transport to [facility] for stress test. Patient's daughter, Citlali, made aware.

## 2023-03-23 NOTE — CARE UPDATE
03/23/23 0802   Patient Assessment/Suction   Level of Consciousness (AVPU) alert   Respiratory Effort Normal;Unlabored   Expansion/Accessory Muscles/Retractions no use of accessory muscles;no retractions;expansion symmetric   All Lung Fields Breath Sounds diminished   Rhythm/Pattern, Respiratory unlabored;pattern regular;depth regular   Cough Frequency no cough   PRE-TX-O2   Device (Oxygen Therapy) room air   SpO2 96 %   Pulse Oximetry Type Intermittent   $ Pulse Oximetry - Multiple Charge Pulse Oximetry - Multiple   Pulse 72   Resp 16   Aerosol Therapy   $ Aerosol Therapy Charges PRN treatment not required   Respiratory Treatment Status (SVN) PRN treatment not required   Incentive Spirometer   $ Incentive Spirometer Charges done with encouragement   Incentive Spirometer Predicted Level (mL) 1840   Administration (IS) instruction provided, follow-up   Number of Repetitions (IS) 8   Level Incentive Spirometer (mL) 1500   Patient Tolerance (IS) good   Preset CPAP/BiPAP Settings   Mode Of Delivery other (see comments)  (pt has home CPAP at bedside)

## 2023-03-23 NOTE — PLAN OF CARE
Problem: Adult Inpatient Plan of Care  Goal: Plan of Care Review  Outcome: Ongoing, Progressing     Problem: Skin Injury Risk Increased  Goal: Skin Health and Integrity  Outcome: Ongoing, Progressing     Problem: Impaired Wound Healing  Goal: Optimal Wound Healing  Outcome: Ongoing, Progressing     Patient resting soundly between care. AAOx2. Heparin gtt currently @ 9 units/kg/hr, Next aPTT to be drawn with morning labs. Currently awaiting Tx to [facility] for stress test in AM, NPO since MN. Telesitter @ bedside.  Bed alarm on, wheels locked in lowest position.

## 2023-03-23 NOTE — PLAN OF CARE
Submitted to [facility] for ambulance transportation to/from [facility] for procedure      1100 AM spoke with Lizzeth from [facility]- no [facility] auth will be given for ambulance transportation due to pt going to another facility and coming back due to this facility not being able to perform test. Auth request cancelled

## 2023-03-23 NOTE — ASSESSMENT & PLAN NOTE
Patient with Long standing persistent (>12 months) atrial fibrillation which is controlled currently with Beta Blocker. Patient is currently in atrial fibrillation.[id number] Score: 4.  Anticoagulation indicated. Anticoagulation done with warfarin chronically; currently on heparin drip.

## 2023-03-23 NOTE — PLAN OF CARE
Patient off unit, at stress test, at the time of evaluation.    He has been cleared for surgery per Cardiology and Hematology     Coagulation status discussed with Dr. Gamino    Plan to stop heparin drip this afternoon  Repeat coags   He will need normal coagulation studies prior to surgery    Plavix and aspirin have been held x2 days  Type and screen  Transfuse 1 unit platelets preoperatively, keep 1 unit of platelets on hold for OR    Please call with questions or concerns  [phone]

## 2023-03-23 NOTE — SUBJECTIVE & OBJECTIVE
Interval History: plan to transfer to [facility] for stress test today and return to [facility] per cardiology recommendations.  Tentatively planned for cervical fusion tomorrow with Dr. Kc.  He seems more alert this morning and is coherent.  He is able to discuss his plan of care and seems to understand.    Review of Systems   Constitutional:  Positive for fatigue. Negative for chills and fever.   HENT:  Negative for congestion.    Respiratory:  Negative for chest tightness and shortness of breath.    Cardiovascular:  Negative for chest pain and palpitations.   Gastrointestinal:  Negative for abdominal pain, diarrhea, nausea and vomiting.   Genitourinary:  Negative for dysuria and hematuria.   Musculoskeletal:  Positive for arthralgias, gait problem and myalgias.   Skin:  Positive for color change and wound.   Neurological:  Positive for weakness. Negative for headaches.   Hematological:  Bruises/bleeds easily.   Psychiatric/Behavioral:  Negative for agitation and confusion.    All other systems reviewed and are negative.  Objective:     Vital Signs (Most Recent):  Temp: 97.3 °F (36.3 °C) (03/23/23 0747)  Pulse: 72 (03/23/23 0802)  Resp: 16 (03/23/23 0802)  BP: (!) 164/77 (03/23/23 0747)  SpO2: 96 % (03/23/23 0802)   Vital Signs (24h Range):  Temp:  [97.2 °F (36.2 °C)-97.7 °F (36.5 °C)] 97.3 °F (36.3 °C)  Pulse:  [60-81] 72  Resp:  [16-18] 16  SpO2:  [94 %-96 %] 96 %  BP: (117-164)/(63-97) 164/77     Weight: 111.1 kg (245 lb)  Body mass index is 36.18 kg/m².    Intake/Output Summary (Last 24 hours) at 3/23/2023 1058  Last data filed at 3/22/2023 2309  Gross per 24 hour   Intake 840 ml   Output 350 ml   Net 490 ml      Physical Exam  Constitutional:       General: He is not in acute distress.     Appearance: He is well-developed. He is ill-appearing.   HENT:      Head: Contusion (forehead) and laceration (right side of nose) present.   Eyes:      Conjunctiva/sclera: Conjunctivae normal.      Pupils: Pupils are equal,  round, and reactive to light.   Cardiovascular:      Rate and Rhythm: Normal rate and regular rhythm.      Pulses: Normal pulses.      Heart sounds: Murmur heard.   Pulmonary:      Effort: Pulmonary effort is normal.      Breath sounds: Normal breath sounds.   Abdominal:      General: Bowel sounds are normal.      Palpations: Abdomen is soft.   Musculoskeletal:         General: Tenderness (right shoulder) present.      Cervical back: Normal range of motion and neck supple.      Right lower leg: Edema present.      Left lower leg: Edema present.      Comments: Decreased ROM R shoulder   Skin:     General: Skin is warm and dry.      Findings: Bruising and rash (chronic skin changes to BLE) present.      Comments: Diffuse ecchymosis face and neck   Neurological:      General: No focal deficit present.      Mental Status: He is alert and oriented to person, place, and time.      Comments: Orientation much improved today     Psychiatric:         Mood and Affect: Mood is depressed.         Behavior: Behavior normal.       Significant Labs: All pertinent labs within the past 24 hours have been reviewed.  CBC:   Recent Labs   Lab 03/22/23  0514 03/22/23  1411 03/23/23  0516   WBC 5.82 6.03 6.42   HGB 8.4* 8.7* 8.4*   HCT 24.7* 25.1* 23.6*    195 186     CMP:   Recent Labs   Lab 03/22/23  0514 03/23/23  0516    139   K 4.3 4.4    111*   CO2 23 22*   GLU 96 100   BUN 27* 27*   CREATININE 1.3 1.4   CALCIUM 8.8 9.0   ANIONGAP 7* 6*       Significant Imaging: I have reviewed all pertinent imaging results/findings within the past 24 hours.

## 2023-03-23 NOTE — PROGRESS NOTES
After speaking with dr farley it was decided to be conservative and do stress test tomorrow. Will plan this tomorrow keep patient Npo past midnight

## 2023-03-23 NOTE — PLAN OF CARE
Pt/family changed mind and now want SNF at [facility].   Pt has been accepted at [facility] and admit paperwork already completed by daughter.   CM spoke with Lizzeth from [facility] [place] [facility] received request for SNF but will not review until closer to pt being medically ready for DC. Lizzeth will need to be notified once approaching clearance.    Pt having stress test done today for cardiac clearance for procedure with Dr. Kc tomorrow.      03/23/23 1225   Post-Acute Status   Post-Acute Authorization Placement   Post-Acute Placement Status Pending medical clearance/testing

## 2023-03-23 NOTE — PLAN OF CARE
Problem: Physical Therapy  Goal: Physical Therapy Goal  Description: Goals to be met by: 2023     Patient will increase functional independence with mobility by performin. Supine to sit with MInimal Assistance  2. Sit to stand transfer with Moderate Assistance  3. Bed to chair transfer with Maximum Assistance using Rolling Walker  4. Gait  x 10 feet with Maximum Assistance using Rolling Walker.   5. Sitting at edge of bed x20 minutes with Minimal Assistance  3. Lower extremity exercise program x20 reps   Outcome: Ongoing, Progressing   Therapeutic activity : bed mobility, transfers, gait with rw and assistance, LE exercises.

## 2023-03-24 ENCOUNTER — ANESTHESIA (OUTPATIENT)
Dept: SURGERY | Facility: HOSPITAL | Age: 78
DRG: 028 | End: 2023-03-24
Payer: MEDICARE

## 2023-03-24 LAB
ANION GAP SERPL CALC-SCNC: 10 MMOL/L (ref 8–16)
ANION GAP SERPL CALC-SCNC: 7 MMOL/L (ref 8–16)
ANION GAP SERPL CALC-SCNC: 8 MMOL/L (ref 8–16)
APTT BLDCRRT: 25.8 SEC (ref 21–32)
BASOPHILS # BLD AUTO: 0.02 K/UL (ref 0–0.2)
BASOPHILS # BLD AUTO: 0.02 K/UL (ref 0–0.2)
BASOPHILS NFR BLD: 0.3 % (ref 0–1.9)
BASOPHILS NFR BLD: 0.3 % (ref 0–1.9)
BUN SERPL-MCNC: 25 MG/DL (ref 8–23)
CALCIUM SERPL-MCNC: 8.9 MG/DL (ref 8.7–10.5)
CALCIUM SERPL-MCNC: 9.1 MG/DL (ref 8.7–10.5)
CALCIUM SERPL-MCNC: 9.2 MG/DL (ref 8.7–10.5)
CHLORIDE SERPL-SCNC: 104 MMOL/L (ref 95–110)
CHLORIDE SERPL-SCNC: 105 MMOL/L (ref 95–110)
CHLORIDE SERPL-SCNC: 108 MMOL/L (ref 95–110)
CO2 SERPL-SCNC: 22 MMOL/L (ref 23–29)
CO2 SERPL-SCNC: 23 MMOL/L (ref 23–29)
CO2 SERPL-SCNC: 24 MMOL/L (ref 23–29)
CREAT SERPL-MCNC: 1.2 MG/DL (ref 0.5–1.4)
CREAT SERPL-MCNC: 1.3 MG/DL (ref 0.5–1.4)
CREAT SERPL-MCNC: 1.3 MG/DL (ref 0.5–1.4)
DIFFERENTIAL METHOD: ABNORMAL
DIFFERENTIAL METHOD: ABNORMAL
EOSINOPHIL # BLD AUTO: 0 K/UL (ref 0–0.5)
EOSINOPHIL # BLD AUTO: 0.1 K/UL (ref 0–0.5)
EOSINOPHIL NFR BLD: 0.1 % (ref 0–8)
EOSINOPHIL NFR BLD: 2 % (ref 0–8)
ERYTHROCYTE [DISTWIDTH] IN BLOOD BY AUTOMATED COUNT: 15.2 % (ref 11.5–14.5)
ERYTHROCYTE [DISTWIDTH] IN BLOOD BY AUTOMATED COUNT: 16.6 % (ref 11.5–14.5)
EST. GFR  (NO RACE VARIABLE): 57 ML/MIN/1.73 M^2
EST. GFR  (NO RACE VARIABLE): 57 ML/MIN/1.73 M^2
EST. GFR  (NO RACE VARIABLE): >60 ML/MIN/1.73 M^2
GLUCOSE SERPL-MCNC: 145 MG/DL (ref 70–110)
GLUCOSE SERPL-MCNC: 165 MG/DL (ref 70–110)
GLUCOSE SERPL-MCNC: 99 MG/DL (ref 70–110)
HCT VFR BLD AUTO: 25 % (ref 40–54)
HCT VFR BLD AUTO: 27.6 % (ref 40–54)
HGB BLD-MCNC: 8.4 G/DL (ref 14–18)
HGB BLD-MCNC: 9.4 G/DL (ref 14–18)
IMM GRANULOCYTES # BLD AUTO: 0.04 K/UL (ref 0–0.04)
IMM GRANULOCYTES # BLD AUTO: 0.13 K/UL (ref 0–0.04)
IMM GRANULOCYTES NFR BLD AUTO: 0.7 % (ref 0–0.5)
IMM GRANULOCYTES NFR BLD AUTO: 1.7 % (ref 0–0.5)
INR PPP: 1.1 (ref 0.8–1.2)
LYMPHOCYTES # BLD AUTO: 0.4 K/UL (ref 1–4.8)
LYMPHOCYTES # BLD AUTO: 1.1 K/UL (ref 1–4.8)
LYMPHOCYTES NFR BLD: 17.6 % (ref 18–48)
LYMPHOCYTES NFR BLD: 5.3 % (ref 18–48)
MAGNESIUM SERPL-MCNC: 2.2 MG/DL (ref 1.6–2.6)
MCH RBC QN AUTO: 33 PG (ref 27–31)
MCH RBC QN AUTO: 33.3 PG (ref 27–31)
MCHC RBC AUTO-ENTMCNC: 33.6 G/DL (ref 32–36)
MCHC RBC AUTO-ENTMCNC: 34.1 G/DL (ref 32–36)
MCV RBC AUTO: 97 FL (ref 82–98)
MCV RBC AUTO: 99 FL (ref 82–98)
MONOCYTES # BLD AUTO: 0.1 K/UL (ref 0.3–1)
MONOCYTES # BLD AUTO: 0.5 K/UL (ref 0.3–1)
MONOCYTES NFR BLD: 0.9 % (ref 4–15)
MONOCYTES NFR BLD: 7.7 % (ref 4–15)
NEUTROPHILS # BLD AUTO: 4.4 K/UL (ref 1.8–7.7)
NEUTROPHILS # BLD AUTO: 7.1 K/UL (ref 1.8–7.7)
NEUTROPHILS NFR BLD: 71.7 % (ref 38–73)
NEUTROPHILS NFR BLD: 91.7 % (ref 38–73)
NRBC BLD-RTO: 0 /100 WBC
NRBC BLD-RTO: 0 /100 WBC
PLATELET # BLD AUTO: 186 K/UL (ref 150–450)
PLATELET # BLD AUTO: 198 K/UL (ref 150–450)
PMV BLD AUTO: 10.5 FL (ref 9.2–12.9)
PMV BLD AUTO: 10.8 FL (ref 9.2–12.9)
POTASSIUM SERPL-SCNC: 4.4 MMOL/L (ref 3.5–5.1)
POTASSIUM SERPL-SCNC: 4.6 MMOL/L (ref 3.5–5.1)
POTASSIUM SERPL-SCNC: 4.8 MMOL/L (ref 3.5–5.1)
PROTHROMBIN TIME: 11.9 SEC (ref 9–12.5)
RBC # BLD AUTO: 2.52 M/UL (ref 4.6–6.2)
RBC # BLD AUTO: 2.85 M/UL (ref 4.6–6.2)
SODIUM SERPL-SCNC: 135 MMOL/L (ref 136–145)
SODIUM SERPL-SCNC: 137 MMOL/L (ref 136–145)
SODIUM SERPL-SCNC: 139 MMOL/L (ref 136–145)
WBC # BLD AUTO: 6.13 K/UL (ref 3.9–12.7)
WBC # BLD AUTO: 7.69 K/UL (ref 3.9–12.7)

## 2023-03-24 PROCEDURE — 99900035 HC TECH TIME PER 15 MIN (STAT)

## 2023-03-24 PROCEDURE — 99900104 DSU ONLY-NO CHARGE-EA ADD'L HR (STAT): Performed by: NEUROLOGICAL SURGERY

## 2023-03-24 PROCEDURE — 63048 PR LAMINECT/FACETECT/FORAMINOT, EA ADDTL VERTEBRAL SEGM: ICD-10-PCS | Mod: ,,, | Performed by: NEUROLOGICAL SURGERY

## 2023-03-24 PROCEDURE — 22843 PR POSTERIOR SEGMENTAL INSTRUMENTATION 7-12 VRT SEG: ICD-10-PCS | Mod: ,,, | Performed by: NEUROLOGICAL SURGERY

## 2023-03-24 PROCEDURE — 71000033 HC RECOVERY, INTIAL HOUR: Performed by: NEUROLOGICAL SURGERY

## 2023-03-24 PROCEDURE — 61783 PR STEREOTACTIC COMP ASSIST PROC,SPINAL: ICD-10-PCS | Mod: 59,,, | Performed by: NEUROLOGICAL SURGERY

## 2023-03-24 PROCEDURE — 25000003 PHARM REV CODE 250: Performed by: NEUROLOGICAL SURGERY

## 2023-03-24 PROCEDURE — C1729 CATH, DRAINAGE: HCPCS | Performed by: NEUROLOGICAL SURGERY

## 2023-03-24 PROCEDURE — 36430 TRANSFUSION BLD/BLD COMPNT: CPT

## 2023-03-24 PROCEDURE — 99900103 DSU ONLY-NO CHARGE-INITIAL HR (STAT): Performed by: NEUROLOGICAL SURGERY

## 2023-03-24 PROCEDURE — 22600 PR ARTHRODESIS, POST/POSTLAT, SNGL INTERSPACE, CERVICAL BELOW C2: ICD-10-PCS | Mod: 51,,, | Performed by: NEUROLOGICAL SURGERY

## 2023-03-24 PROCEDURE — 25000003 PHARM REV CODE 250: Performed by: ANESTHESIOLOGY

## 2023-03-24 PROCEDURE — D9220A PRA ANESTHESIA: ICD-10-PCS | Mod: ANES,,, | Performed by: ANESTHESIOLOGY

## 2023-03-24 PROCEDURE — 36000711: Performed by: NEUROLOGICAL SURGERY

## 2023-03-24 PROCEDURE — 63600175 PHARM REV CODE 636 W HCPCS: Performed by: ANESTHESIOLOGY

## 2023-03-24 PROCEDURE — 94761 N-INVAS EAR/PLS OXIMETRY MLT: CPT

## 2023-03-24 PROCEDURE — 27201423 OPTIME MED/SURG SUP & DEVICES STERILE SUPPLY: Performed by: NEUROLOGICAL SURGERY

## 2023-03-24 PROCEDURE — D9220A PRA ANESTHESIA: Mod: ANES,,, | Performed by: ANESTHESIOLOGY

## 2023-03-24 PROCEDURE — 63600175 PHARM REV CODE 636 W HCPCS: Performed by: NURSE ANESTHETIST, CERTIFIED REGISTERED

## 2023-03-24 PROCEDURE — P9045 ALBUMIN (HUMAN), 5%, 250 ML: HCPCS | Mod: JZ | Performed by: NURSE ANESTHETIST, CERTIFIED REGISTERED

## 2023-03-24 PROCEDURE — 63600175 PHARM REV CODE 636 W HCPCS: Performed by: NEUROLOGICAL SURGERY

## 2023-03-24 PROCEDURE — 12000002 HC ACUTE/MED SURGE SEMI-PRIVATE ROOM

## 2023-03-24 PROCEDURE — C1769 GUIDE WIRE: HCPCS | Performed by: NEUROLOGICAL SURGERY

## 2023-03-24 PROCEDURE — 22843 INSERT SPINE FIXATION DEVICE: CPT | Mod: ,,, | Performed by: NEUROLOGICAL SURGERY

## 2023-03-24 PROCEDURE — 83735 ASSAY OF MAGNESIUM: CPT | Performed by: NURSE PRACTITIONER

## 2023-03-24 PROCEDURE — 36620 ARTERIAL: ICD-10-PCS | Mod: 59,,, | Performed by: ANESTHESIOLOGY

## 2023-03-24 PROCEDURE — P9035 PLATELET PHERES LEUKOREDUCED: HCPCS | Performed by: NEUROLOGICAL SURGERY

## 2023-03-24 PROCEDURE — 22600 ARTHRD PST TQ 1NTRSPC CRV: CPT | Mod: 51,,, | Performed by: NEUROLOGICAL SURGERY

## 2023-03-24 PROCEDURE — D9220A PRA ANESTHESIA: ICD-10-PCS | Mod: CRNA,,, | Performed by: NURSE ANESTHETIST, CERTIFIED REGISTERED

## 2023-03-24 PROCEDURE — 20936 PR AUTOGRAFT SPINE SURGERY LOCAL FROM SAME INCISION: ICD-10-PCS | Mod: ,,, | Performed by: NEUROLOGICAL SURGERY

## 2023-03-24 PROCEDURE — D9220A PRA ANESTHESIA: Mod: CRNA,,, | Performed by: NURSE ANESTHETIST, CERTIFIED REGISTERED

## 2023-03-24 PROCEDURE — 76937 ARTERIAL: ICD-10-PCS | Mod: 26,,, | Performed by: ANESTHESIOLOGY

## 2023-03-24 PROCEDURE — 94799 UNLISTED PULMONARY SVC/PX: CPT

## 2023-03-24 PROCEDURE — 36620 INSERTION CATHETER ARTERY: CPT | Mod: 59,,, | Performed by: ANESTHESIOLOGY

## 2023-03-24 PROCEDURE — 22614 ARTHRD PST TQ 1NTRSPC EA ADD: CPT | Mod: ,,, | Performed by: NEUROLOGICAL SURGERY

## 2023-03-24 PROCEDURE — 25000003 PHARM REV CODE 250: Performed by: NURSE PRACTITIONER

## 2023-03-24 PROCEDURE — 85025 COMPLETE CBC W/AUTO DIFF WBC: CPT | Mod: 91 | Performed by: NURSE PRACTITIONER

## 2023-03-24 PROCEDURE — 36415 COLL VENOUS BLD VENIPUNCTURE: CPT | Performed by: NURSE PRACTITIONER

## 2023-03-24 PROCEDURE — 37000008 HC ANESTHESIA 1ST 15 MINUTES: Performed by: NEUROLOGICAL SURGERY

## 2023-03-24 PROCEDURE — A4216 STERILE WATER/SALINE, 10 ML: HCPCS | Performed by: NURSE PRACTITIONER

## 2023-03-24 PROCEDURE — 20930 PR ALLOGRAFT FOR SPINE SURGERY ONLY MORSELIZED: ICD-10-PCS | Mod: ,,, | Performed by: NEUROLOGICAL SURGERY

## 2023-03-24 PROCEDURE — 36000710: Performed by: NEUROLOGICAL SURGERY

## 2023-03-24 PROCEDURE — C1713 ANCHOR/SCREW BN/BN,TIS/BN: HCPCS | Performed by: NEUROLOGICAL SURGERY

## 2023-03-24 PROCEDURE — 80048 BASIC METABOLIC PNL TOTAL CA: CPT | Mod: 91 | Performed by: NEUROLOGICAL SURGERY

## 2023-03-24 PROCEDURE — 85610 PROTHROMBIN TIME: CPT | Performed by: NURSE PRACTITIONER

## 2023-03-24 PROCEDURE — 22614 PR ARTHRODESIS, POST/POSTLAT, SNGL INTERSPACE, EA ADDTL: ICD-10-PCS | Mod: ,,, | Performed by: NEUROLOGICAL SURGERY

## 2023-03-24 PROCEDURE — 27800903 OPTIME MED/SURG SUP & DEVICES OTHER IMPLANTS: Performed by: NEUROLOGICAL SURGERY

## 2023-03-24 PROCEDURE — 25000003 PHARM REV CODE 250: Performed by: NURSE ANESTHETIST, CERTIFIED REGISTERED

## 2023-03-24 PROCEDURE — 37000009 HC ANESTHESIA EA ADD 15 MINS: Performed by: NEUROLOGICAL SURGERY

## 2023-03-24 PROCEDURE — 36415 COLL VENOUS BLD VENIPUNCTURE: CPT | Performed by: NEUROLOGICAL SURGERY

## 2023-03-24 PROCEDURE — 80048 BASIC METABOLIC PNL TOTAL CA: CPT | Performed by: NURSE PRACTITIONER

## 2023-03-24 PROCEDURE — 85730 THROMBOPLASTIN TIME PARTIAL: CPT | Performed by: NURSE PRACTITIONER

## 2023-03-24 PROCEDURE — P9016 RBC LEUKOCYTES REDUCED: HCPCS | Performed by: ANESTHESIOLOGY

## 2023-03-24 PROCEDURE — 63045 PR LAMINEC/FACETECT/FORAMIN,CERVICAL 1 SEG: ICD-10-PCS | Mod: ,,, | Performed by: NEUROLOGICAL SURGERY

## 2023-03-24 PROCEDURE — 20936 SP BONE AGRFT LOCAL ADD-ON: CPT | Mod: ,,, | Performed by: NEUROLOGICAL SURGERY

## 2023-03-24 PROCEDURE — 71000039 HC RECOVERY, EACH ADD'L HOUR: Performed by: NEUROLOGICAL SURGERY

## 2023-03-24 PROCEDURE — 76937 US GUIDE VASCULAR ACCESS: CPT | Mod: 26,,, | Performed by: ANESTHESIOLOGY

## 2023-03-24 PROCEDURE — 61783 SCAN PROC SPINAL: CPT | Mod: 59,,, | Performed by: NEUROLOGICAL SURGERY

## 2023-03-24 PROCEDURE — 63045 LAM FACETEC & FORAMOT CRV: CPT | Mod: ,,, | Performed by: NEUROLOGICAL SURGERY

## 2023-03-24 PROCEDURE — 20930 SP BONE ALGRFT MORSEL ADD-ON: CPT | Mod: ,,, | Performed by: NEUROLOGICAL SURGERY

## 2023-03-24 PROCEDURE — 63048 LAM FACETEC &FORAMOT EA ADDL: CPT | Mod: ,,, | Performed by: NEUROLOGICAL SURGERY

## 2023-03-24 DEVICE — IMPLANTABLE DEVICE: Type: IMPLANTABLE DEVICE | Site: SPINE CERVICAL | Status: FUNCTIONAL

## 2023-03-24 DEVICE — MATRIX VIBONE VIABLE BONE 5CC: Type: IMPLANTABLE DEVICE | Site: BACK | Status: FUNCTIONAL

## 2023-03-24 RX ORDER — HYDROMORPHONE HYDROCHLORIDE 2 MG/ML
2 INJECTION, SOLUTION INTRAMUSCULAR; INTRAVENOUS; SUBCUTANEOUS
Status: DISCONTINUED | OUTPATIENT
Start: 2023-03-24 | End: 2023-03-28 | Stop reason: HOSPADM

## 2023-03-24 RX ORDER — SUCCINYLCHOLINE CHLORIDE 20 MG/ML
INJECTION INTRAMUSCULAR; INTRAVENOUS
Status: DISCONTINUED | OUTPATIENT
Start: 2023-03-24 | End: 2023-03-24

## 2023-03-24 RX ORDER — AMOXICILLIN 250 MG
2 CAPSULE ORAL NIGHTLY PRN
Status: DISCONTINUED | OUTPATIENT
Start: 2023-03-24 | End: 2023-03-28 | Stop reason: HOSPADM

## 2023-03-24 RX ORDER — MEPERIDINE HYDROCHLORIDE 50 MG/ML
12.5 INJECTION INTRAMUSCULAR; INTRAVENOUS; SUBCUTANEOUS ONCE
Status: DISCONTINUED | OUTPATIENT
Start: 2023-03-24 | End: 2023-03-24 | Stop reason: HOSPADM

## 2023-03-24 RX ORDER — MIDAZOLAM HYDROCHLORIDE 1 MG/ML
INJECTION, SOLUTION INTRAMUSCULAR; INTRAVENOUS
Status: DISCONTINUED | OUTPATIENT
Start: 2023-03-24 | End: 2023-03-24

## 2023-03-24 RX ORDER — PROPOFOL 10 MG/ML
VIAL (ML) INTRAVENOUS CONTINUOUS PRN
Status: DISCONTINUED | OUTPATIENT
Start: 2023-03-24 | End: 2023-03-24

## 2023-03-24 RX ORDER — HYDROMORPHONE HYDROCHLORIDE 2 MG/ML
0.2 INJECTION, SOLUTION INTRAMUSCULAR; INTRAVENOUS; SUBCUTANEOUS EVERY 5 MIN PRN
Status: DISCONTINUED | OUTPATIENT
Start: 2023-03-24 | End: 2023-03-24 | Stop reason: HOSPADM

## 2023-03-24 RX ORDER — OXYCODONE HYDROCHLORIDE 10 MG/1
10 TABLET ORAL
Status: DISCONTINUED | OUTPATIENT
Start: 2023-03-24 | End: 2023-03-28 | Stop reason: HOSPADM

## 2023-03-24 RX ORDER — PROPOFOL 10 MG/ML
VIAL (ML) INTRAVENOUS
Status: DISCONTINUED | OUTPATIENT
Start: 2023-03-24 | End: 2023-03-24

## 2023-03-24 RX ORDER — ALBUMIN HUMAN 50 G/1000ML
SOLUTION INTRAVENOUS CONTINUOUS PRN
Status: DISCONTINUED | OUTPATIENT
Start: 2023-03-24 | End: 2023-03-24

## 2023-03-24 RX ORDER — ONDANSETRON 2 MG/ML
4 INJECTION INTRAMUSCULAR; INTRAVENOUS ONCE
Status: DISCONTINUED | OUTPATIENT
Start: 2023-03-24 | End: 2023-03-24 | Stop reason: HOSPADM

## 2023-03-24 RX ORDER — PHENYLEPHRINE HYDROCHLORIDE 10 MG/ML
INJECTION INTRAVENOUS
Status: DISCONTINUED | OUTPATIENT
Start: 2023-03-24 | End: 2023-03-24

## 2023-03-24 RX ORDER — DIPHENHYDRAMINE HYDROCHLORIDE 50 MG/ML
25 INJECTION INTRAMUSCULAR; INTRAVENOUS EVERY 6 HOURS PRN
Status: DISCONTINUED | OUTPATIENT
Start: 2023-03-24 | End: 2023-03-24 | Stop reason: HOSPADM

## 2023-03-24 RX ORDER — FENTANYL CITRATE 50 UG/ML
25 INJECTION, SOLUTION INTRAMUSCULAR; INTRAVENOUS EVERY 5 MIN PRN
Status: DISCONTINUED | OUTPATIENT
Start: 2023-03-24 | End: 2023-03-24 | Stop reason: HOSPADM

## 2023-03-24 RX ORDER — FENTANYL CITRATE 50 UG/ML
INJECTION, SOLUTION INTRAMUSCULAR; INTRAVENOUS
Status: DISCONTINUED | OUTPATIENT
Start: 2023-03-24 | End: 2023-03-24

## 2023-03-24 RX ORDER — CEFAZOLIN SODIUM 2 G/50ML
2 SOLUTION INTRAVENOUS
Status: COMPLETED | OUTPATIENT
Start: 2023-03-24 | End: 2023-03-24

## 2023-03-24 RX ORDER — BISACODYL 10 MG
10 SUPPOSITORY, RECTAL RECTAL DAILY
Status: DISCONTINUED | OUTPATIENT
Start: 2023-03-25 | End: 2023-03-28

## 2023-03-24 RX ORDER — MUPIROCIN 20 MG/G
OINTMENT TOPICAL 2 TIMES DAILY
Status: DISCONTINUED | OUTPATIENT
Start: 2023-03-24 | End: 2023-03-28 | Stop reason: HOSPADM

## 2023-03-24 RX ORDER — MAG HYDROX/ALUMINUM HYD/SIMETH 200-200-20
30 SUSPENSION, ORAL (FINAL DOSE FORM) ORAL EVERY 4 HOURS PRN
Status: DISCONTINUED | OUTPATIENT
Start: 2023-03-24 | End: 2023-03-24 | Stop reason: SDUPTHER

## 2023-03-24 RX ORDER — DEXAMETHASONE SODIUM PHOSPHATE 4 MG/ML
INJECTION, SOLUTION INTRA-ARTICULAR; INTRALESIONAL; INTRAMUSCULAR; INTRAVENOUS; SOFT TISSUE
Status: DISCONTINUED | OUTPATIENT
Start: 2023-03-24 | End: 2023-03-24

## 2023-03-24 RX ORDER — OXYCODONE AND ACETAMINOPHEN 10; 325 MG/1; MG/1
1 TABLET ORAL EVERY 4 HOURS PRN
Status: DISCONTINUED | OUTPATIENT
Start: 2023-03-24 | End: 2023-03-28 | Stop reason: HOSPADM

## 2023-03-24 RX ORDER — HYDROCODONE BITARTRATE AND ACETAMINOPHEN 500; 5 MG/1; MG/1
TABLET ORAL
Status: DISCONTINUED | OUTPATIENT
Start: 2023-03-24 | End: 2023-03-24 | Stop reason: HOSPADM

## 2023-03-24 RX ORDER — EPHEDRINE SULFATE 50 MG/ML
INJECTION, SOLUTION INTRAVENOUS
Status: DISCONTINUED | OUTPATIENT
Start: 2023-03-24 | End: 2023-03-24

## 2023-03-24 RX ORDER — ONDANSETRON 2 MG/ML
INJECTION INTRAMUSCULAR; INTRAVENOUS
Status: DISCONTINUED | OUTPATIENT
Start: 2023-03-24 | End: 2023-03-24

## 2023-03-24 RX ORDER — ONDANSETRON 4 MG/1
8 TABLET, ORALLY DISINTEGRATING ORAL EVERY 6 HOURS PRN
Status: DISCONTINUED | OUTPATIENT
Start: 2023-03-24 | End: 2023-03-28 | Stop reason: HOSPADM

## 2023-03-24 RX ORDER — ACETAMINOPHEN 325 MG/1
650 TABLET ORAL EVERY 6 HOURS PRN
Status: DISCONTINUED | OUTPATIENT
Start: 2023-03-24 | End: 2023-03-28 | Stop reason: HOSPADM

## 2023-03-24 RX ORDER — PROCHLORPERAZINE EDISYLATE 5 MG/ML
5 INJECTION INTRAMUSCULAR; INTRAVENOUS EVERY 6 HOURS PRN
Status: DISCONTINUED | OUTPATIENT
Start: 2023-03-24 | End: 2023-03-28 | Stop reason: HOSPADM

## 2023-03-24 RX ORDER — ROCURONIUM BROMIDE 10 MG/ML
INJECTION, SOLUTION INTRAVENOUS
Status: DISCONTINUED | OUTPATIENT
Start: 2023-03-24 | End: 2023-03-24

## 2023-03-24 RX ORDER — OXYCODONE HYDROCHLORIDE 5 MG/1
5 TABLET ORAL
Status: DISCONTINUED | OUTPATIENT
Start: 2023-03-24 | End: 2023-03-24 | Stop reason: HOSPADM

## 2023-03-24 RX ORDER — KETAMINE HYDROCHLORIDE 100 MG/ML
INJECTION, SOLUTION INTRAMUSCULAR; INTRAVENOUS
Status: DISCONTINUED | OUTPATIENT
Start: 2023-03-24 | End: 2023-03-24

## 2023-03-24 RX ORDER — LIDOCAINE HYDROCHLORIDE 20 MG/ML
INJECTION INTRAVENOUS
Status: DISCONTINUED | OUTPATIENT
Start: 2023-03-24 | End: 2023-03-24

## 2023-03-24 RX ADMIN — FERROUS SULFATE TAB 325 MG (65 MG ELEMENTAL FE) 1 EACH: 325 (65 FE) TAB at 08:03

## 2023-03-24 RX ADMIN — CEFAZOLIN SODIUM 1 G: 2 SOLUTION INTRAVENOUS at 01:03

## 2023-03-24 RX ADMIN — PHENYLEPHRINE HYDROCHLORIDE 200 MCG: 10 INJECTION INTRAVENOUS at 08:03

## 2023-03-24 RX ADMIN — FENTANYL CITRATE 100 MCG: 50 INJECTION, SOLUTION INTRAMUSCULAR; INTRAVENOUS at 09:03

## 2023-03-24 RX ADMIN — ONDANSETRON 4 MG: 2 INJECTION INTRAMUSCULAR; INTRAVENOUS at 08:03

## 2023-03-24 RX ADMIN — ROCURONIUM BROMIDE 10 MG: 10 INJECTION, SOLUTION INTRAVENOUS at 08:03

## 2023-03-24 RX ADMIN — KETAMINE HYDROCHLORIDE 100 MG: 100 INJECTION, SOLUTION, CONCENTRATE INTRAMUSCULAR; INTRAVENOUS at 11:03

## 2023-03-24 RX ADMIN — FENTANYL CITRATE 25 MCG: 50 INJECTION, SOLUTION INTRAMUSCULAR; INTRAVENOUS at 03:03

## 2023-03-24 RX ADMIN — CARVEDILOL 25 MG: 25 TABLET, FILM COATED ORAL at 06:03

## 2023-03-24 RX ADMIN — TRAZODONE HYDROCHLORIDE 50 MG: 50 TABLET ORAL at 08:03

## 2023-03-24 RX ADMIN — DEXAMETHASONE SODIUM PHOSPHATE 8 MG: 4 INJECTION, SOLUTION INTRA-ARTICULAR; INTRALESIONAL; INTRAMUSCULAR; INTRAVENOUS; SOFT TISSUE at 08:03

## 2023-03-24 RX ADMIN — METHOCARBAMOL 1000 MG: 100 INJECTION, SOLUTION INTRAMUSCULAR; INTRAVENOUS at 03:03

## 2023-03-24 RX ADMIN — MIDAZOLAM 2 MG: 1 INJECTION INTRAMUSCULAR; INTRAVENOUS at 08:03

## 2023-03-24 RX ADMIN — CALCITRIOL CAPSULES 0.25 MCG 0.75 MCG: 0.25 CAPSULE ORAL at 08:03

## 2023-03-24 RX ADMIN — LIDOCAINE HYDROCHLORIDE 100 MG: 20 INJECTION, SOLUTION INTRAVENOUS at 01:03

## 2023-03-24 RX ADMIN — SODIUM CHLORIDE, SODIUM GLUCONATE, SODIUM ACETATE, POTASSIUM CHLORIDE AND MAGNESIUM CHLORIDE: 526; 502; 368; 37; 30 INJECTION, SOLUTION INTRAVENOUS at 10:03

## 2023-03-24 RX ADMIN — PROPOFOL 50 MCG/KG/MIN: 10 INJECTION, EMULSION INTRAVENOUS at 09:03

## 2023-03-24 RX ADMIN — ALLOPURINOL 100 MG: 100 TABLET ORAL at 08:03

## 2023-03-24 RX ADMIN — SODIUM CHLORIDE, SODIUM GLUCONATE, SODIUM ACETATE, POTASSIUM CHLORIDE AND MAGNESIUM CHLORIDE: 526; 502; 368; 37; 30 INJECTION, SOLUTION INTRAVENOUS at 07:03

## 2023-03-24 RX ADMIN — DEXTROSE MONOHYDRATE 3 G: 50 INJECTION, SOLUTION INTRAVENOUS at 08:03

## 2023-03-24 RX ADMIN — METHOCARBAMOL 1000 MG: 100 INJECTION, SOLUTION INTRAMUSCULAR; INTRAVENOUS at 10:03

## 2023-03-24 RX ADMIN — PHENYLEPHRINE HYDROCHLORIDE 40 MCG/MIN: 10 INJECTION INTRAVENOUS at 08:03

## 2023-03-24 RX ADMIN — SUCCINYLCHOLINE CHLORIDE 120 MG: 20 INJECTION, SOLUTION INTRAMUSCULAR; INTRAVENOUS at 08:03

## 2023-03-24 RX ADMIN — EPHEDRINE SULFATE 10 MG: 50 INJECTION, SOLUTION INTRAMUSCULAR; INTRAVENOUS; SUBCUTANEOUS at 08:03

## 2023-03-24 RX ADMIN — MUPIROCIN: 20 OINTMENT TOPICAL at 08:03

## 2023-03-24 RX ADMIN — FENTANYL CITRATE 100 MCG: 50 INJECTION, SOLUTION INTRAMUSCULAR; INTRAVENOUS at 08:03

## 2023-03-24 RX ADMIN — DOCUSATE SODIUM AND SENNOSIDES 1 TABLET: 8.6; 5 TABLET, FILM COATED ORAL at 08:03

## 2023-03-24 RX ADMIN — PROPOFOL 140 MG: 10 INJECTION, EMULSION INTRAVENOUS at 08:03

## 2023-03-24 RX ADMIN — SODIUM CHLORIDE, PRESERVATIVE FREE 10 ML: 5 INJECTION INTRAVENOUS at 06:03

## 2023-03-24 RX ADMIN — FAMOTIDINE 40 MG: 20 TABLET ORAL at 08:03

## 2023-03-24 RX ADMIN — CEFAZOLIN SODIUM 2 G: 2 SOLUTION INTRAVENOUS at 09:03

## 2023-03-24 RX ADMIN — AMIODARONE HYDROCHLORIDE 200 MG: 200 TABLET ORAL at 08:03

## 2023-03-24 RX ADMIN — EPHEDRINE SULFATE 25 MG: 50 INJECTION, SOLUTION INTRAMUSCULAR; INTRAVENOUS; SUBCUTANEOUS at 08:03

## 2023-03-24 RX ADMIN — ALBUMIN (HUMAN): 12.5 SOLUTION INTRAVENOUS at 08:03

## 2023-03-24 RX ADMIN — LISINOPRIL 40 MG: 40 TABLET ORAL at 08:03

## 2023-03-24 RX ADMIN — FENTANYL CITRATE 100 MCG: 50 INJECTION, SOLUTION INTRAMUSCULAR; INTRAVENOUS at 11:03

## 2023-03-24 RX ADMIN — KETAMINE HYDROCHLORIDE 100 MG: 100 INJECTION, SOLUTION, CONCENTRATE INTRAMUSCULAR; INTRAVENOUS at 08:03

## 2023-03-24 RX ADMIN — LIDOCAINE HYDROCHLORIDE 100 MG: 20 INJECTION, SOLUTION INTRAVENOUS at 08:03

## 2023-03-24 RX ADMIN — EPHEDRINE SULFATE 5 MG: 50 INJECTION, SOLUTION INTRAMUSCULAR; INTRAVENOUS; SUBCUTANEOUS at 08:03

## 2023-03-24 NOTE — ASSESSMENT & PLAN NOTE
Consult hematology for recommendations regarding anticoagulation-daughter prefers to avoid lovenox.  Placed on IV heparin drip-warfarin stopped  Further anticoagulation held for now 2/2 surgery

## 2023-03-24 NOTE — PROGRESS NOTES
03/24/23 1719        Altered Skin Integrity 03/20/23 0409 Left anterior;distal;lower Leg #1 Skin Tear   Date First Assessed/Time First Assessed: 03/20/23 0409   Altered Skin Integrity Present on Admission - Did Patient arrive to the hospital with altered skin?: yes  Side: Left  Orientation: anterior;distal;lower  Location: Leg  Wound Number: #1  Primary...   Care Cleansed with:;Wound cleanser;Applied:;Skin Barrier   Dressing Applied;Silver;Foam        Altered Skin Integrity 03/20/23 2100 Right anterior;lower;proximal Arm #2 Skin Tear   Date First Assessed/Time First Assessed: 03/20/23 2100   Altered Skin Integrity Present on Admission - Did Patient arrive to the hospital with altered skin?: yes  Side: Right  Orientation: anterior;lower;proximal  Location: Arm  Wound Number: #2  Prim...   Care Cleansed with:;Wound cleanser;Applied:;Skin Barrier   Dressing Applied;Silver;Foam        Altered Skin Integrity 03/21/23 0411 Right anterior;distal;lower Arm #3 Skin Tear   Date First Assessed/Time First Assessed: 03/21/23 0411   Altered Skin Integrity Present on Admission - Did Patient arrive to the hospital with altered skin?: yes  Side: Right  Orientation: anterior;distal;lower  Location: Arm  Wound Number: #3  Primar...   Care Cleansed with:;Wound cleanser;Applied:;Skin Barrier   Dressing Applied;Silver;Foam        Altered Skin Integrity 03/21/23 0411 Left anterior;upper Arm #4 Skin Tear   Date First Assessed/Time First Assessed: 03/21/23 0411   Altered Skin Integrity Present on Admission - Did Patient arrive to the hospital with altered skin?: yes  Side: Left  Orientation: anterior;upper  Location: Arm  Wound Number: #4  Primary Wound ...   Care Cleansed with:;Wound cleanser;Applied:;Skin Barrier   Dressing Applied;Silver;Foam        Altered Skin Integrity 03/21/23 0412 Right anterior;lower;proximal Leg #5 Skin Tear   Date First Assessed/Time First Assessed: 03/21/23 0412   Altered Skin Integrity Present on Admission -  Did Patient arrive to the hospital with altered skin?: yes  Side: Right  Orientation: anterior;lower;proximal  Location: Leg  Wound Number: #5  Prim...   Care Cleansed with:;Wound cleanser;Applied:;Skin Barrier   Dressing Applied;Silver;Foam        Altered Skin Integrity 03/21/23 0413 Right anterior Groin #6 Moisture associated dermatitis   Date First Assessed/Time First Assessed: 03/21/23 0413   Altered Skin Integrity Present on Admission - Did Patient arrive to the hospital with altered skin?: yes  Side: Right  Orientation: anterior  Location: Groin  Wound Number: #6  Primary Wound Typ...   Care Cleansed with:;Wound cleanser;Applied:;Skin Barrier   Dressing Applied;Silver;Foam        Altered Skin Integrity 03/21/23 0414 Left anterior Groin #7 Moisture associated dermatitis   Date First Assessed/Time First Assessed: 03/21/23 0414   Altered Skin Integrity Present on Admission - Did Patient arrive to the hospital with altered skin?: yes  Side: Left  Orientation: anterior  Location: Groin  Wound Number: #7  Primary Wound Type...   Care Cleansed with:;Wound cleanser;Applied:;Skin Barrier   Dressing Applied;Silver;Foam        Altered Skin Integrity 03/21/23 0415 Sacral spine #8 Incontinence associated dermatitis   Date First Assessed/Time First Assessed: 03/21/23 0415   Altered Skin Integrity Present on Admission - Did Patient arrive to the hospital with altered skin?: yes  Location: Sacral spine  Wound Number: #8  Primary Wound Type: Incontinence associated de...   Care Cleansed with:;Wound cleanser;Applied:;Skin Barrier   Dressing Applied;Silver;Foam

## 2023-03-24 NOTE — ANESTHESIA PROCEDURE NOTES
Intubation    Date/Time: 3/24/2023 8:38 AM  Performed by: Parviz Brush Jr., CRNA  Authorized by: Jonathan Lay MD     Intubation:     Induction:  Intravenous    Intubated:  Postinduction    Mask Ventilation:  Easy mask    Attempts:  1    Attempted By:  CRNA    Method of Intubation:  Video laryngoscopy    Blade:  Dexter 3    Laryngeal View Grade: Grade I - full view of cords      Difficult Airway Encountered?: No      Complications:  None    Airway Device:  Oral endotracheal tube    Airway Device Size:  7.5    Style/Cuff Inflation:  Cuffed (inflated to minimal occlusive pressure)    Inflation Amount (mL):  6    Tube secured:  21    Secured at:  The lips    Placement Verified By:  Capnometry and Fiber optic visualization    Complicating Factors:  Large/floppy epiglottis and poor neck/head extension    Findings Post-Intubation:  BS equal bilateral and atraumatic/condition of teeth unchanged  Notes:      Poor dentition, acute neck injury, intubated without difficulty in neutral stable position

## 2023-03-24 NOTE — PLAN OF CARE
03/23/23 2003   Patient Assessment/Suction   Level of Consciousness (AVPU) alert   Respiratory Effort Normal;Unlabored   Expansion/Accessory Muscles/Retractions expansion symmetric   All Lung Fields Breath Sounds diminished   Rhythm/Pattern, Respiratory pattern regular   Cough Frequency no cough   PRE-TX-O2   Device (Oxygen Therapy) room air   SpO2 97 %   Pulse Oximetry Type Intermittent   Aerosol Therapy   $ Aerosol Therapy Charges PRN treatment not required   Incentive Spirometer   $ Incentive Spirometer Charges done with encouragement   Administration (IS) proper technique demonstrated   Number of Repetitions (IS) 10   Level Incentive Spirometer (mL) 1750   Patient Tolerance (IS) good   Preset CPAP/BiPAP Settings   Mode Of Delivery other (see comments)  (pt using home cpap at night)

## 2023-03-24 NOTE — PROGRESS NOTES
[facility] Hospital Medicine  Progress Note    Patient Name: Richard Bustos  MRN: [id number]  Patient Class: IP- Inpatient   Admission Date: 3/20/2023  Length of Stay: 2 days  Attending Physician: Pauly Link MD  Primary Care Provider: Familia Ramirez Jr, MD        Subjective:     Principal Problem:Spinal cord compression        HPI:  Richard Bustos is a 78 y/o M with a past medical history significant for anemia, CAD, CHF, CKD, atrial fibrillation on warfarin, DM2, HLD, HTN, and GERD who presented to the ED for further evaluation of BLE weakness which began following a fall 3 days ago.  Pt reports that he was walking down a ramp at home when he slipped due to the ramp being wet.  He fell to his knees, but continued the fall onto his face and head and had positive loss of consciousness.  He was initially seen at [facility] ED where he was transferred to [facility].  He reports an extensive workup with no fractures seen and was ultimately discharged home.  He reports difficulty bearing weight since the fall and feels substantially weaker than his baseline.  He reports that he normally uses a cane for ambulation, but has not been able to ambulate at all since his fall 3 days ago.  Today in the ED, CT head is significant for frontal scalp hematoma with no acute intracranial injury seen; bilateral nasal bone fractures are noted.  CXR is concerning for pneumonia.  He will be placed in observation under the service of hospital medicine for continued monitoring and treatment.      Overview/Hospital Course:  Richard Bustos was monitored closely during his hospitalization.  He was placed on IV rocephin and IV azithromycin in treatment of pneumonia.  An xray of his right shoulder was performed due to continued c/o pain and decreased ROM following his fall.  PT/OT were consulted.  An MRI of the brain was obtained with no acute findings.  MRI cervical spine concerning for C6 fractures, neural foraminal and spinal canal  stenosis, severe cansl stenosis cord compression C5-6 and mild to moderate cord flattening at C3-4, and C4-5, and other findings compatible with possible trauma as correlates with given history.  MRI lumbar spine showed multilevel degenerative changes, spinal canal stenosis and neural foraminal stenosis.  It was the recommendation of the neurosurgeon that pt undergo posterior cervical decompression/fusion C3-T1 with lumbar surgery to follow at a later date.  Due to the complexity of his health history, cardiology and hematology were consulted.  ASA and plavix were held.       Interval History: seen today postoperatively in the PACU.  Still somewhat sedated, but able to follow commands.    Review of Systems   Unable to perform ROS: Other   Objective:     Vital Signs (Most Recent):  Temp: 98.4 °F (36.9 °C) (03/24/23 0647)  Pulse: 92 (03/24/23 1510)  Resp: (!) 24 (03/24/23 1510)  BP: (!) 185/94 (03/24/23 1510)  SpO2: 96 % (03/24/23 1510)   Vital Signs (24h Range):  Temp:  [96.9 °F (36.1 °C)-98.5 °F (36.9 °C)] 98.4 °F (36.9 °C)  Pulse:  [58-92] 92  Resp:  [16-34] 24  SpO2:  [94 %-100 %] 96 %  BP: (122-185)/(59-94) 185/94  Arterial Line BP: (162-164)/(76-82) 164/76     Weight: 111.1 kg (245 lb)  Body mass index is 36.18 kg/m².    Intake/Output Summary (Last 24 hours) at 3/24/2023 1519  Last data filed at 3/24/2023 1347  Gross per 24 hour   Intake 2440 ml   Output 750 ml   Net 1690 ml      Physical Exam  Constitutional:       General: He is not in acute distress.     Appearance: He is well-developed. He is ill-appearing.   HENT:      Head: Contusion (forehead) and laceration (right side of nose) present.   Eyes:      Pupils: Pupils are equal, round, and reactive to light.   Neck:      Comments: Cervical collar in place (hard)  Cardiovascular:      Rate and Rhythm: Normal rate and regular rhythm.      Pulses: Normal pulses.      Heart sounds: Murmur heard.   Pulmonary:      Effort: Pulmonary effort is normal. Tachypnea  present.      Breath sounds: Rhonchi present.   Abdominal:      General: Bowel sounds are normal.      Palpations: Abdomen is soft.   Musculoskeletal:         General: Tenderness (right shoulder) present.      Right lower leg: Edema present.      Left lower leg: Edema present.      Comments: Decreased ROM R shoulder   Skin:     General: Skin is warm and dry.      Findings: Bruising and rash (chronic skin changes to BLE) present.      Comments: Diffuse ecchymosis face and neck   Neurological:      General: No focal deficit present.      Mental Status: He is alert.      Comments:      Psychiatric:      Comments: Unable to assess at this time       Significant Labs: All pertinent labs within the past 24 hours have been reviewed.  CBC:   Recent Labs   Lab 03/23/23  0516 03/24/23  0447   WBC 6.42 6.13   HGB 8.4* 8.4*   HCT 23.6* 25.0*    198     CMP:   Recent Labs   Lab 03/23/23  0516 03/24/23  0447    139   K 4.4 4.4   * 108   CO2 22* 24    99   BUN 27* 25*   CREATININE 1.4 1.2   CALCIUM 9.0 9.2   ANIONGAP 6* 7*       Significant Imaging: I have reviewed all pertinent imaging results/findings within the past 24 hours.      Assessment/Plan:      * Spinal cord compression  POD 0 s/p 1. Partial C2 and T1 laminectomy, medial facetectomy   2. Total C3, C4, C5, C6, C7 laminectomy, medial facetectomy  3. Posterior instrumentation bilateral C3-T2 using stereotactic navigation  4. Posterolateral arthrodesis C3-4, C4-5, C5-6, C6-7, C7-T1, T1-T2  5. Harvesting local autograft  6. Implantation of cell based allograft  7. Intraoperative neuro monitoring with Dr. Kc  Continue to follow recommendations .  Follow hemoglobin and hematocrit closely.  Pain control   Physical therapy as per neurosurgery instruction with fall precautions.          Pneumonia  IV rocephin-completed 3 day course azithromycin  brochodilators prn  Supplemental oxygen as needed   Check procalcitonin-WNL  Sputum culture  Would benefit  from IS      Closed nondisplaced fracture of sixth cervical vertebra  MRI brain/cervical spine/lumbar spine-reviewed  Consult neurosurgery      Warfarin anticoagulation  On heparin drip currently-surgical procedure tentatively planned for tomorrow.  Will hold heparin starting at midnight.      Chronic systolic congestive heart failure  Patient is identified as having Systolic (HFrEF) heart failure that is Chronic. CHF is currently controlled. Latest ECHO performed and demonstrates- Results for orders placed during the hospital encounter of 10/22/21    Echo    Interpretation Summary  · The left ventricle is mildly enlarged with mild concentric hypertrophy and mildly decreased systolic function.  · The estimated ejection fraction is 40%.  · Grade I left ventricular diastolic dysfunction.  · There are segmental left ventricular wall motion abnormalities. There is mid-inferior dyskinesis  · Mild tricuspid regurgitation.  · Severe left atrial enlargement.  · Mild right ventricular enlargement with normal right ventricular systolic function.  . Continue Beta Blocker and monitor clinical status closely. Monitor on telemetry. Patient is off CHF pathway.  Monitor strict Is&Os and daily weights.  Continue to stress to patient importance of self efficacy and  on diet for CHF. Last BNP reviewed- and noted below No results for input(s): BNP, BNPTRIAGEBLO in the last 168 hours..      Anemia due to stage 3 chronic kidney disease  Patient's anemia is currently controlled. S/p 0 units of PRBCs.  Current CBC reviewed-   Lab Results   Component Value Date    HGB 8.4 (L) 03/22/2023    HCT 24.7 (L) 03/22/2023    MCV 98 03/22/2023     03/22/2023     Monitor serial CBC and transfuse if patient becomes hemodynamically unstable, symptomatic or H/H drops below 7/21.         Generalized weakness  Etiology unclear.  Check CPK-mildly elevated.  Fluids given overnight-trend; now WNL.     PT/OT consult  Maintain fall  precautions  Consult neurology/neurosurgery        Stasis dermatitis of both legs  chronic      Paroxysmal atrial fibrillation  Patient with Long standing persistent (>12 months) atrial fibrillation which is controlled currently with Beta Blocker. Patient is currently in atrial fibrillation.[id number] Score: 4.  Anticoagulation indicated. Anticoagulation done with warfarin chronically on hold 2/2 surgery.        CKD (chronic kidney disease) stage 3, GFR 30-59 ml/min, 41  Creatine stable for now. BMP reviewed- noted Estimated Creatinine Clearance: 56 mL/min (based on SCr of 1.4 mg/dL). according to latest data. Monitor UOP and serial BMP and adjust therapy as needed. Renally dose meds.          GERD (gastroesophageal reflux disease)  Chronic; continue home pepcid      CAD (coronary artery disease)  Cardiology consulted for cardiac clearance-stress test today.      Hypertension associated with diabetes  Chronic, controlled.  Latest blood pressure and vitals reviewed-   Temp:  [96.9 °F (36.1 °C)-98.5 °F (36.9 °C)]   Pulse:  [58-92]   Resp:  [16-34]   BP: (122-185)/(59-94)   SpO2:  [94 %-100 %]   Arterial Line BP: (162-164)/(76-82) .   Home meds for hypertension were reviewed and noted below.   Hypertension Medications             carvediloL (COREG) 25 MG tablet Take 1 tablet (25 mg total) by mouth 2 (two) times daily with meals.    lisinopriL (PRINIVIL,ZESTRIL) 40 MG tablet Take 1 tablet (40 mg total) by mouth every evening.    nitroGLYCERIN 0.4 MG/DOSE TL SPRY (NITROLINGUAL) 400 mcg/spray spray PLACE 1 SPRAY UNDER THE TONGUE EVERY 5 MINUTES AS NEEDED FOR CHEST PAIN          While in the hospital, will manage blood pressure as follows; Adjust home antihypertensive regimen as follows- continue chronic beta blocker; will hold ACE for now due to recent EMMY.  Monitor creatinine and resume lisinopril when proven stable.-resume nightly lisinopril and continue to monitor.    Will utilize p.r.n. blood pressure medication only  if patient's blood pressure greater than  180/110 and he develops symptoms such as worsening chest pain or shortness of breath.      MTHFR mutation (methylenetetrahydrofolate reductase)  Consult hematology for recommendations regarding anticoagulation-daughter prefers to avoid lovenox.  Placed on IV heparin drip-warfarin stopped  Further anticoagulation held for now 2/2 surgery      Diabetes mellitus with chronic kidney disease, stage III  Not on chronic meds since bariatric surgery.  Monitor daily glucose-has been controlled.      VTE Risk Mitigation (From admission, onward)         Ordered     IP VTE HIGH RISK PATIENT  Once         03/24/23 0621     Place sequential compression device  Until discontinued         03/24/23 0621     Place sequential compression device  Until discontinued         03/20/23 1929     Reason for No Pharmacological VTE Prophylaxis  Once        Question:  Reasons:  Answer:  Already adequately anticoagulated on oral Anticoagulants    03/20/23 1929                Discharge Planning   ORACIO: 3/28/2023     Code Status: Prior   Is the patient medically ready for discharge?:     Reason for patient still in hospital (select all that apply): Patient trending condition, Treatment, Consult recommendations, PT / OT recommendations and Pending disposition  Discharge Plan A: Skilled Nursing Facility                  RICK Nassar  Department of Hospital Medicine   [facility]

## 2023-03-24 NOTE — ASSESSMENT & PLAN NOTE
Patient with Long standing persistent (>12 months) atrial fibrillation which is controlled currently with Beta Blocker. Patient is currently in atrial fibrillation.[id number] Score: 4.  Anticoagulation indicated. Anticoagulation done with warfarin chronically on hold 2/2 surgery.

## 2023-03-24 NOTE — ANESTHESIA PREPROCEDURE EVALUATION
03/24/2023  Richard Bustos is a 77 y.o., male.      Pre-op Assessment    I have reviewed the Patient Summary Reports.     I have reviewed the Nursing Notes. I have reviewed the NPO Status.   I have reviewed the Medications.     Review of Systems  Anesthesia Hx:  No problems with previous Anesthesia    Social:  Former Smoker    Hematology/Oncology:         -- Anemia: --  Cancer in past history (prostate CA, SCCA):    Cardiovascular:   Hypertension, well controlled Past MI CAD asymptomatic CABG/stent Dysrhythmias atrial fibrillation CHF PVD hyperlipidemia    Pulmonary:   Pneumonia Sleep Apnea    Renal/:   Chronic Renal Disease (stage 3), CKD BPH    Hepatic/GI:   GERD    Musculoskeletal:   Arthritis     Neurological:   Neuromuscular Disease,  Dementia    Endocrine:   Diabetes, well controlled, type 2  Obesity / BMI > 30, Morbid Obesity / BMI > 40  Psych:   Psychiatric History depression          Physical Exam  General: Well nourished, Cooperative, Alert and Oriented    Airway:  Mallampati: II   Mouth Opening: Normal  TM Distance: Normal  Neck ROM: Normal ROM        Anesthesia Plan  Type of Anesthesia, risks & benefits discussed:    Anesthesia Type: Gen ETT, Gen Supraglottic Airway, Gen Natural Airway, MAC  Intra-op Monitoring Plan: Standard ASA Monitors  Post Op Pain Control Plan: multimodal analgesia  Induction:  IV  Airway Plan: Direct, Video and Fiberoptic, Post-Induction  Informed Consent: Informed consent signed with the Patient and all parties understand the risks and agree with anesthesia plan.  All questions answered.   ASA Score: 4  Anesthesia Plan Notes: Recent fall and pt very bruised everywhere.  Good ROM head and neck without symptoms.    Ready For Surgery From Anesthesia Perspective.     .

## 2023-03-24 NOTE — ANESTHESIA POSTPROCEDURE EVALUATION
Anesthesia Post Evaluation    Patient: Richard Bustos    Procedure(s) Performed: Procedure(s) (LRB):  FUSION, SPINE, CERVICAL (N/A)    Final Anesthesia Type: general      Patient location during evaluation: PACU  Patient participation: Yes- Able to Participate  Level of consciousness: awake and alert and oriented  Post-procedure vital signs: reviewed and stable  Pain management: adequate  Airway patency: patent    PONV status at discharge: No PONV  Anesthetic complications: no      Cardiovascular status: blood pressure returned to baseline and stable  Respiratory status: unassisted and spontaneous ventilation  Hydration status: euvolemic  Follow-up not needed.          Vitals Value Taken Time   /81 03/24/23 1550   Temp   03/24/23 1551   Pulse 82 03/24/23 1550   Resp 18 03/24/23 1550   SpO2 100 % 03/24/23 1550   Vitals shown include unvalidated device data.      No case tracking events are documented in the log.      Pain/Chris Score: Pain Rating Prior to Med Admin: 4 (3/24/2023  3:38 PM)  Pain Rating Post Med Admin: 8 (3/23/2023  4:26 PM)  Chris Score: 8 (3/24/2023  3:15 PM)

## 2023-03-24 NOTE — BRIEF OP NOTE
Date of procedure:  March 24, 2023     Preoperative diagnosis:    1. Central cord syndrome    2. Multifocal cervical stenosis, severe    3. Acute C6 lamina fracture    3. Acute multifocal cervical ligamentous injury    4. Multifocal lumbar spinal stenosis, severe     5. Neurogenic claudication     6. Pneumonia     7. Long-term anticoagulants    8. Morbid obesity     9. Mechanical fall    Postoperative diagnosis:    1. Same     Procedures:    1. Partial C2 and T1 laminectomy, medial facetectomy   2. Total C3, C4, C5, C6, C7 laminectomy, medial facetectomy  3. Posterior instrumentation bilateral C3-T2 using stereotactic navigation  4. Posterolateral arthrodesis C3-4, C4-5, C5-6, C6-7, C7-T1, T1-T2  5. Harvesting local autograft  6. Implantation of cell based allograft  7. Intraoperative neuro monitoring    Surgeon: Kike Kc DO, MBA    Assistant:  See medical record     Anesthesia:  General endotracheal and Exparel    Estimated blood loss:  300 cc     Specimens:  None     Complications:  None     Blood products:  1 unit platelets and 1 unit RBC given preoperatively     The patient tolerated the above procedures well.  He was extubated in the operating room transferred to the recovery room in hemodynamically stable condition.  Neuro monitoring signals were stable throughout the procedure.  The patient's son was updated postoperatively.

## 2023-03-24 NOTE — PROGRESS NOTES
Criteria met per anesthesia for transfer to floor oriented to self [place]. Medicated for pain with IV analgesics. Transferred to 219 per bed with telemetry Report given to Kacie Luther at bedside

## 2023-03-24 NOTE — ASSESSMENT & PLAN NOTE
POD 0 s/p 1. Partial C2 and T1 laminectomy, medial facetectomy   2. Total C3, C4, C5, C6, C7 laminectomy, medial facetectomy  3. Posterior instrumentation bilateral C3-T2 using stereotactic navigation  4. Posterolateral arthrodesis C3-4, C4-5, C5-6, C6-7, C7-T1, T1-T2  5. Harvesting local autograft  6. Implantation of cell based allograft  7. Intraoperative neuro monitoring with Dr. Kc  Continue to follow recommendations .  Follow hemoglobin and hematocrit closely.  Pain control   Physical therapy as per neurosurgery instruction with fall precautions.

## 2023-03-24 NOTE — TRANSFER OF CARE
"Anesthesia Transfer of Care Note    Patient: Richard Bustos    Procedure(s) Performed: Procedure(s) (LRB):  FUSION, SPINE, CERVICAL (N/A)    Patient location: PACU    Anesthesia Type: general    Transport from OR: Transported from OR on 6-10 L/min O2 by face mask with adequate spontaneous ventilation    Post pain: adequate analgesia    Post assessment: no apparent anesthetic complications    Post vital signs: stable    Level of consciousness: awake and responds to stimulation    Nausea/Vomiting: no nausea/vomiting    Complications: none    Transfer of care protocol was followed      Last vitals:   Visit Vitals  /64   Pulse 70   Temp 36.9 °C (98.4 °F) (Oral)   Resp 16   Ht 5' 9" (1.753 m)   Wt 111.1 kg (245 lb)   SpO2 97%   BMI 36.18 kg/m²     "

## 2023-03-24 NOTE — SUBJECTIVE & OBJECTIVE
Interval History: seen today postoperatively in the PACU.  Still somewhat sedated, but able to follow commands.    Review of Systems   Unable to perform ROS: Other   Objective:     Vital Signs (Most Recent):  Temp: 98.4 °F (36.9 °C) (03/24/23 0647)  Pulse: 92 (03/24/23 1510)  Resp: (!) 24 (03/24/23 1510)  BP: (!) 185/94 (03/24/23 1510)  SpO2: 96 % (03/24/23 1510)   Vital Signs (24h Range):  Temp:  [96.9 °F (36.1 °C)-98.5 °F (36.9 °C)] 98.4 °F (36.9 °C)  Pulse:  [58-92] 92  Resp:  [16-34] 24  SpO2:  [94 %-100 %] 96 %  BP: (122-185)/(59-94) 185/94  Arterial Line BP: (162-164)/(76-82) 164/76     Weight: 111.1 kg (245 lb)  Body mass index is 36.18 kg/m².    Intake/Output Summary (Last 24 hours) at 3/24/2023 1519  Last data filed at 3/24/2023 1347  Gross per 24 hour   Intake 2440 ml   Output 750 ml   Net 1690 ml      Physical Exam  Constitutional:       General: He is not in acute distress.     Appearance: He is well-developed. He is ill-appearing.   HENT:      Head: Contusion (forehead) and laceration (right side of nose) present.   Eyes:      Pupils: Pupils are equal, round, and reactive to light.   Neck:      Comments: Cervical collar in place (hard)  Cardiovascular:      Rate and Rhythm: Normal rate and regular rhythm.      Pulses: Normal pulses.      Heart sounds: Murmur heard.   Pulmonary:      Effort: Pulmonary effort is normal. Tachypnea present.      Breath sounds: Rhonchi present.   Abdominal:      General: Bowel sounds are normal.      Palpations: Abdomen is soft.   Musculoskeletal:         General: Tenderness (right shoulder) present.      Right lower leg: Edema present.      Left lower leg: Edema present.      Comments: Decreased ROM R shoulder   Skin:     General: Skin is warm and dry.      Findings: Bruising and rash (chronic skin changes to BLE) present.      Comments: Diffuse ecchymosis face and neck   Neurological:      General: No focal deficit present.      Mental Status: He is alert.      Comments:       Psychiatric:      Comments: Unable to assess at this time       Significant Labs: All pertinent labs within the past 24 hours have been reviewed.  CBC:   Recent Labs   Lab 03/23/23  0516 03/24/23  0447   WBC 6.42 6.13   HGB 8.4* 8.4*   HCT 23.6* 25.0*    198     CMP:   Recent Labs   Lab 03/23/23  0516 03/24/23  0447    139   K 4.4 4.4   * 108   CO2 22* 24    99   BUN 27* 25*   CREATININE 1.4 1.2   CALCIUM 9.0 9.2   ANIONGAP 6* 7*       Significant Imaging: I have reviewed all pertinent imaging results/findings within the past 24 hours.

## 2023-03-24 NOTE — ANESTHESIA PROCEDURE NOTES
Arterial    Diagnosis: CHF    Patient location during procedure: done in OR  Procedure start time: 3/24/2023 8:45 AM  Timeout: 3/24/2023 8:45 AM  Procedure end time: 3/24/2023 9:00 AM    Staffing  Authorizing Provider: Jonathan Lay MD  Performing Provider: Jonathan Lay MD    Anesthesiologist was present at the time of the procedure.    Preanesthetic Checklist  Completed: patient identified, IV checked, site marked, risks and benefits discussed, surgical consent, monitors and equipment checked, pre-op evaluation, timeout performed and anesthesia consent givenArterial  Skin Prep: alcohol swabs  Local Infiltration: none  Orientation: right  Location: radial    Catheter Size: 20 G  Catheter placement by Ultrasound guidance. Heme positive aspiration all ports.   Vessel Caliber: medium, patent, compressibility normal  Vascular Doppler:  not done  Needle advanced into vessel with real time Ultrasound guidance.  Guidewire confirmed in vessel.  Sterile sheath used.  Image recorded and saved.Insertion Attempts: 1  Assessment  Dressing: secured with tape and tegaderm  Patient: Tolerated well

## 2023-03-24 NOTE — ASSESSMENT & PLAN NOTE
Chronic, controlled.  Latest blood pressure and vitals reviewed-   Temp:  [96.9 °F (36.1 °C)-98.5 °F (36.9 °C)]   Pulse:  [58-92]   Resp:  [16-34]   BP: (122-185)/(59-94)   SpO2:  [94 %-100 %]   Arterial Line BP: (162-164)/(76-82) .   Home meds for hypertension were reviewed and noted below.   Hypertension Medications             carvediloL (COREG) 25 MG tablet Take 1 tablet (25 mg total) by mouth 2 (two) times daily with meals.    lisinopriL (PRINIVIL,ZESTRIL) 40 MG tablet Take 1 tablet (40 mg total) by mouth every evening.    nitroGLYCERIN 0.4 MG/DOSE TL SPRY (NITROLINGUAL) 400 mcg/spray spray PLACE 1 SPRAY UNDER THE TONGUE EVERY 5 MINUTES AS NEEDED FOR CHEST PAIN          While in the hospital, will manage blood pressure as follows; Adjust home antihypertensive regimen as follows- continue chronic beta blocker; will hold ACE for now due to recent EMMY.  Monitor creatinine and resume lisinopril when proven stable.-resume nightly lisinopril and continue to monitor.    Will utilize p.r.n. blood pressure medication only if patient's blood pressure greater than  180/110 and he develops symptoms such as worsening chest pain or shortness of breath.

## 2023-03-24 NOTE — NURSING
Patient arrived to unit from PACU. Recovery nurse and patient's family at bedside. Patient drowsy but oriented. Rates pain 2/10. Vitals stable. No apparent distress. Ruelas in place, clear yellow urine noted. O2 at 2L via NC. Telemetry monitor in place. Will readmit to unit and review transfer orders.

## 2023-03-24 NOTE — PLAN OF CARE
Patient ready for surgery. Surgery and anesthesia consents signed. Educated on incentive spirometer use. Belongings in room 219 and cell with son. Family set up with text message notifications.

## 2023-03-25 PROBLEM — T14.8XXA MULTIPLE SKIN TEARS: Status: ACTIVE | Noted: 2023-03-25

## 2023-03-25 LAB
ANION GAP SERPL CALC-SCNC: 10 MMOL/L (ref 8–16)
BASOPHILS # BLD AUTO: 0.01 K/UL (ref 0–0.2)
BASOPHILS NFR BLD: 0.1 % (ref 0–1.9)
BUN SERPL-MCNC: 26 MG/DL (ref 8–23)
CALCIUM SERPL-MCNC: 9.1 MG/DL (ref 8.7–10.5)
CHLORIDE SERPL-SCNC: 102 MMOL/L (ref 95–110)
CO2 SERPL-SCNC: 23 MMOL/L (ref 23–29)
CREAT SERPL-MCNC: 1.2 MG/DL (ref 0.5–1.4)
DIFFERENTIAL METHOD: ABNORMAL
EOSINOPHIL # BLD AUTO: 0 K/UL (ref 0–0.5)
EOSINOPHIL NFR BLD: 0 % (ref 0–8)
ERYTHROCYTE [DISTWIDTH] IN BLOOD BY AUTOMATED COUNT: 16.2 % (ref 11.5–14.5)
EST. GFR  (NO RACE VARIABLE): >60 ML/MIN/1.73 M^2
GLUCOSE SERPL-MCNC: 120 MG/DL (ref 70–110)
HCT VFR BLD AUTO: 26.5 % (ref 40–54)
HGB BLD-MCNC: 9.1 G/DL (ref 14–18)
IMM GRANULOCYTES # BLD AUTO: 0.09 K/UL (ref 0–0.04)
IMM GRANULOCYTES NFR BLD AUTO: 0.9 % (ref 0–0.5)
LYMPHOCYTES # BLD AUTO: 0.8 K/UL (ref 1–4.8)
LYMPHOCYTES NFR BLD: 7.8 % (ref 18–48)
MAGNESIUM SERPL-MCNC: 2 MG/DL (ref 1.6–2.6)
MCH RBC QN AUTO: 32.7 PG (ref 27–31)
MCHC RBC AUTO-ENTMCNC: 34.3 G/DL (ref 32–36)
MCV RBC AUTO: 95 FL (ref 82–98)
MONOCYTES # BLD AUTO: 0.7 K/UL (ref 0.3–1)
MONOCYTES NFR BLD: 6.7 % (ref 4–15)
NEUTROPHILS # BLD AUTO: 8.4 K/UL (ref 1.8–7.7)
NEUTROPHILS NFR BLD: 84.5 % (ref 38–73)
NRBC BLD-RTO: 0 /100 WBC
PLATELET # BLD AUTO: 220 K/UL (ref 150–450)
PMV BLD AUTO: 10.5 FL (ref 9.2–12.9)
POTASSIUM SERPL-SCNC: 4.6 MMOL/L (ref 3.5–5.1)
RBC # BLD AUTO: 2.78 M/UL (ref 4.6–6.2)
SODIUM SERPL-SCNC: 135 MMOL/L (ref 136–145)
WBC # BLD AUTO: 9.95 K/UL (ref 3.9–12.7)

## 2023-03-25 PROCEDURE — 99900035 HC TECH TIME PER 15 MIN (STAT)

## 2023-03-25 PROCEDURE — 97110 THERAPEUTIC EXERCISES: CPT

## 2023-03-25 PROCEDURE — 25000003 PHARM REV CODE 250: Performed by: NURSE PRACTITIONER

## 2023-03-25 PROCEDURE — A4216 STERILE WATER/SALINE, 10 ML: HCPCS | Performed by: NURSE PRACTITIONER

## 2023-03-25 PROCEDURE — 80048 BASIC METABOLIC PNL TOTAL CA: CPT | Performed by: NURSE PRACTITIONER

## 2023-03-25 PROCEDURE — 12000002 HC ACUTE/MED SURGE SEMI-PRIVATE ROOM

## 2023-03-25 PROCEDURE — 94761 N-INVAS EAR/PLS OXIMETRY MLT: CPT

## 2023-03-25 PROCEDURE — 94799 UNLISTED PULMONARY SVC/PX: CPT

## 2023-03-25 PROCEDURE — 97164 PT RE-EVAL EST PLAN CARE: CPT

## 2023-03-25 PROCEDURE — 36415 COLL VENOUS BLD VENIPUNCTURE: CPT | Performed by: NEUROLOGICAL SURGERY

## 2023-03-25 PROCEDURE — 63600175 PHARM REV CODE 636 W HCPCS: Performed by: NEUROLOGICAL SURGERY

## 2023-03-25 PROCEDURE — 85025 COMPLETE CBC W/AUTO DIFF WBC: CPT | Performed by: NEUROLOGICAL SURGERY

## 2023-03-25 PROCEDURE — 83735 ASSAY OF MAGNESIUM: CPT | Performed by: NURSE PRACTITIONER

## 2023-03-25 PROCEDURE — 97168 OT RE-EVAL EST PLAN CARE: CPT

## 2023-03-25 PROCEDURE — 25000003 PHARM REV CODE 250: Performed by: NEUROLOGICAL SURGERY

## 2023-03-25 PROCEDURE — 36415 COLL VENOUS BLD VENIPUNCTURE: CPT | Performed by: NURSE PRACTITIONER

## 2023-03-25 RX ADMIN — METHOCARBAMOL 1000 MG: 100 INJECTION, SOLUTION INTRAMUSCULAR; INTRAVENOUS at 02:03

## 2023-03-25 RX ADMIN — DEXTROSE MONOHYDRATE 3 G: 50 INJECTION, SOLUTION INTRAVENOUS at 04:03

## 2023-03-25 RX ADMIN — TRAZODONE HYDROCHLORIDE 50 MG: 50 TABLET ORAL at 09:03

## 2023-03-25 RX ADMIN — SODIUM CHLORIDE, PRESERVATIVE FREE 10 ML: 5 INJECTION INTRAVENOUS at 12:03

## 2023-03-25 RX ADMIN — LISINOPRIL 40 MG: 40 TABLET ORAL at 09:03

## 2023-03-25 RX ADMIN — METHOCARBAMOL 1000 MG: 100 INJECTION, SOLUTION INTRAMUSCULAR; INTRAVENOUS at 06:03

## 2023-03-25 RX ADMIN — CALCITRIOL CAPSULES 0.25 MCG 0.75 MCG: 0.25 CAPSULE ORAL at 09:03

## 2023-03-25 RX ADMIN — MUPIROCIN: 20 OINTMENT TOPICAL at 09:03

## 2023-03-25 RX ADMIN — CARVEDILOL 25 MG: 25 TABLET, FILM COATED ORAL at 09:03

## 2023-03-25 RX ADMIN — DOCUSATE SODIUM AND SENNOSIDES 1 TABLET: 8.6; 5 TABLET, FILM COATED ORAL at 09:03

## 2023-03-25 RX ADMIN — CARVEDILOL 25 MG: 25 TABLET, FILM COATED ORAL at 06:03

## 2023-03-25 RX ADMIN — SODIUM CHLORIDE, PRESERVATIVE FREE 10 ML: 5 INJECTION INTRAVENOUS at 06:03

## 2023-03-25 RX ADMIN — METHOCARBAMOL 1000 MG: 100 INJECTION, SOLUTION INTRAMUSCULAR; INTRAVENOUS at 09:03

## 2023-03-25 RX ADMIN — FERROUS SULFATE TAB 325 MG (65 MG ELEMENTAL FE) 1 EACH: 325 (65 FE) TAB at 09:03

## 2023-03-25 RX ADMIN — AMIODARONE HYDROCHLORIDE 200 MG: 200 TABLET ORAL at 09:03

## 2023-03-25 RX ADMIN — MELATONIN TAB 3 MG 6 MG: 3 TAB at 09:03

## 2023-03-25 RX ADMIN — FAMOTIDINE 40 MG: 20 TABLET ORAL at 09:03

## 2023-03-25 RX ADMIN — HYDROCODONE BITARTRATE AND ACETAMINOPHEN 1 TABLET: 5; 325 TABLET ORAL at 12:03

## 2023-03-25 RX ADMIN — ALLOPURINOL 100 MG: 100 TABLET ORAL at 09:03

## 2023-03-25 NOTE — PT/OT/SLP RE-EVAL
Physical Therapy Re-evaluation    Patient Name:  Richard Bustos   MRN:  [id number]    Recommendations:     Discharge Recommendations: nursing facility, skilled  Discharge Equipment Recommendations: other (see comments) (TBD)   Barriers to discharge: None    Assessment:     Richard Bustos is a 77 y.o. male admitted with a medical diagnosis of Spinal cord compression.  He presents with the following impairments/functional limitations: weakness, impaired endurance, impaired functional mobility, gait instability, impaired balance, decreased lower extremity function, pain, decreased ROM, edema, impaired sensation .  Patient agreeable to PT reevaluation this morning.  Patient has cervical fusion surgery yesterday so PT re-evaluated this morning.  Patient presented supine in bed and required max assist to transfer to sitting and was able to sit with intermittent mod assist.  Patient then attempted to stand x 3 times with a RW with max assist but was unable to fully stand but was able to lift bottom off the bed.  Patient then returned to bed with max assist x 2 and was instructed in AP and QS to perform during the day.    Rehab Prognosis:  good; patient would benefit from acute skilled PT services to address these deficits and reach maximum level of function.      Recent Surgery: Procedure(s) (LRB):  FUSION, SPINE, CERVICAL (N/A) 1 Day Post-Op    Plan:     During this hospitalization, patient to be seen daily to address the above listed problems via gait training, therapeutic activities  Plan of Care Expires:  04/21/23  Plan of Care Reviewed with: patient    Subjective     Communicated with nurse prior to session.  Patient found supine with bed alarm, peripheral IV, telemetry upon PT entry to room, agreeable to evaluation.      Chief Complaint: pain and weakness  Patient comments/goals: walk  Pain/Comfort:  Pain Rating 1: 3/10  Location - Side 1: Bilateral  Location - Orientation 1: posterior  Location 1: cervical spine  Pain  Addressed 1: Reposition, Cessation of Activity  Pain Rating Post-Intervention 1: 6/10    Patients cultural, spiritual, Scientologist conflicts given the current situation:        Objective:     Patient found with: bed alarm, peripheral IV, telemetry     General Precautions: Standard, fall  Orthopedic Precautions: N/A  Braces: Aspen collar  Respiratory Status: Room air    Exams:  Sensation:    -       Impaired  light/touch worse on left LE  RLE ROM: WFL  RLE Strength: Deficits: 3-/5 overall  LLE ROM: WFL  LLE Strength: Deficits: 3/5 overall    Functional Mobility:  Bed Mobility:     Supine to Sit: maximal assistance and of 2 persons  Sit to Supine: maximal assistance and of 2 persons  Transfers:     Sit to Stand:  total assistance with rolling walker  Balance: sitting EOB with intermittent mod assist.    AM-PAC 6 CLICK MOBILITY  Total Score:8       Treatment and Education:   Exercise to include ankle pumps and quad sets.  All done bilateral EL x 10 reps with 3 second hold  Patient and family instructed in them and asked to perform them frequently during the day.    Patient left supine with call button in reach, bed alarm on, nurse notified, and family present.    GOALS:   Multidisciplinary Problems       Physical Therapy Goals          Problem: Physical Therapy    Goal Priority Disciplines Outcome Goal Variances Interventions   Physical Therapy Goal     PT, PT/OT Ongoing, Progressing     Description: Goals to be met by: 2023     Patient will increase functional independence with mobility by performin. Supine to sit with MInimal Assistance  2. Sit to stand transfer with Moderate Assistance  3. Bed to chair transfer with Maximum Assistance using Rolling Walker  4. Gait  x 10 feet with Maximum Assistance using Rolling Walker.   5. Sitting at edge of bed x20 minutes with Minimal Assistance  3. Lower extremity exercise program x20 reps     3/25/23:  Patient reevaluated for PT this morning and all goals remain  appropriate.                       History:     Past Medical History:   Diagnosis Date    Anemia     Anemia of other chronic disease 09/13/2017    Anemia, chronic renal failure 09/13/2017    Anemia, unspecified 09/13/2017    Anticoagulant long-term use     Aorta aneurysm     Arthritis     Atrial fibrillation     Bacteremia due to Streptococcus 01/08/2022    CAD (coronary artery disease)     CHF (congestive heart failure)     Chronic kidney disease     Clotting disorder 09/13/2017    Colon polyp     Dementia     Diabetes mellitus     not on meds    Diabetes mellitus type II     Diverticulosis     Elevated PSA     Encounter for blood transfusion     Former smoker     General anesthetics causing adverse effect in therapeutic use     TROUBLE COMING OUT OF ANESTHESIA WITH GASTRIC BYPASS    GERD (gastroesophageal reflux disease)     Gout     Hx of colonic polyps     Hypercoagulable state     Hyperlipidemia     Hypertension     MI, old 2010    MTHFR mutation     Myocardial infarction     9/2010    Osteomyelitis of ankle or foot 03/09/2017    Prostate cancer 06/2016    Sleep apnea     Squamous cell carcinoma 01/23/2018    Left helix, imiquimod    Venous stasis     Chronic       Past Surgical History:   Procedure Laterality Date    ABDOMINAL SURGERY      CARDIAC SURGERY      3 STENTS     CAUDAL EPIDURAL STEROID INJECTION N/A 6/22/2020    Procedure: INJECTION, STEROID, SPINE, EPIDURAL, CAUDAL;  Surgeon: Evangelist Bose MD;  Location: [facility] OR;  Service: Pain Management;  Laterality: N/A;    COLONOSCOPY  02/2011    diverticulosis with diverticula with clot, no active bleeding    COLONOSCOPY N/A 8/24/2016    Procedure: COLONOSCOPY;  Surgeon: Saroj Borjas MD;  Location: [facility];  Service: Endoscopy;  Laterality: N/A;    COLONOSCOPY N/A 11/18/2020    Procedure: COLONOSCOPY;  Surgeon: Saroj Borjas MD;  Location: [facility];  Service: Endoscopy;  Laterality: N/A;    COLONOSCOPY N/A 10/27/2021    Procedure: COLONOSCOPY;   Surgeon: Saroj Borjas MD;  Location: [facility] ENDO;  Service: Endoscopy;  Laterality: N/A;    EPIDURAL STEROID INJECTION INTO LUMBAR SPINE N/A 6/22/2020    Procedure: Injection-steroid-epidural-lumbar;  Surgeon: Evangelist Bose MD;  Location: [facility] OR;  Service: Pain Management;  Laterality: N/A;  L3 L4 L5 S1    EPIDURAL STEROID INJECTION INTO LUMBAR SPINE N/A 9/21/2020    Procedure: Injection-steroid-epidural-lumbar;  Surgeon: Evangelist Bose MD;  Location: [facility] OR;  Service: Pain Management;  Laterality: N/A;  L5-S1    GASTRIC BYPASS  2/5/2008    IVC FILTER RETRIEVAL      JOINT REPLACEMENT  1996 and 2001    bi-lat hip replacement/Rt Hip and Lt Hip    RADIOFREQUENCY ABLATION OF LUMBAR MEDIAL BRANCH NERVE AT SINGLE LEVEL Bilateral 1/10/2020    Procedure: Radiofrequency Ablation, Nerve, Spinal, Lumbar, Medial Branch, 1 Level;  Surgeon: Evangelist Bose MD;  Location: [facility] OR;  Service: Pain Management;  Laterality: Bilateral;  L3,L4,L5    RADIOFREQUENCY ABLATION OF LUMBAR MEDIAL BRANCH NERVE AT SINGLE LEVEL Bilateral 11/23/2021    Procedure: Radiofrequency Ablation, Nerve, Spinal, Lumbar, Medial Branch, Bilateral L 3,4,5;  Surgeon: Nile Causey MD;  Location: [facility] OR;  Service: Pain Management;  Laterality: Bilateral;    ROTATOR CUFF REPAIR      Rt shoulder    Stents  8/18/2010    x 3    UPPER GASTROINTESTINAL ENDOSCOPY  02/2011       Time Tracking:     PT Received On: 03/25/23  PT Start Time: 0917     PT Stop Time: 0947  PT Total Time (min): 30 min     Billable Minutes: Re-eval 20 and Therapeutic Exercise 10      03/25/2023

## 2023-03-25 NOTE — SUBJECTIVE & OBJECTIVE
Interval History: surgical procedure yesterday; he is awake and alert today, talking to his son on the telephone.  Mildly confused, but seems to be easily redirected; following commands.    Review of Systems   Constitutional:  Positive for fatigue. Negative for chills and fever.   HENT:  Negative for congestion.    Respiratory:  Negative for chest tightness and shortness of breath.    Cardiovascular:  Negative for chest pain and palpitations.   Gastrointestinal:  Negative for abdominal pain, diarrhea, nausea and vomiting.   Genitourinary:  Negative for dysuria and hematuria.   Musculoskeletal:  Positive for arthralgias, gait problem and myalgias.   Skin:  Positive for color change and wound.   Neurological:  Positive for weakness. Negative for headaches.   Hematological:  Bruises/bleeds easily.   Psychiatric/Behavioral:  Negative for agitation and confusion.    All other systems reviewed and are negative.  Objective:     Vital Signs (Most Recent):  Temp: 96.6 °F (35.9 °C) (03/25/23 1023)  Pulse: 83 (03/25/23 1023)  Resp: 18 (03/25/23 1023)  BP: 120/72 (03/25/23 1023)  SpO2: (!) 94 % (03/25/23 1023)   Vital Signs (24h Range):  Temp:  [96.6 °F (35.9 °C)-98.1 °F (36.7 °C)] 96.6 °F (35.9 °C)  Pulse:  [] 83  Resp:  [6-34] 18  SpO2:  [94 %-100 %] 94 %  BP: (120-199)/(72-99) 120/72  Arterial Line BP: (162-172)/(76-84) 172/80     Weight: 111.1 kg (245 lb)  Body mass index is 36.18 kg/m².    Intake/Output Summary (Last 24 hours) at 3/25/2023 1108  Last data filed at 3/25/2023 0628  Gross per 24 hour   Intake 1390 ml   Output 2045 ml   Net -655 ml      Physical Exam  Constitutional:       General: He is not in acute distress.     Appearance: He is well-developed. He is obese.   HENT:      Head: Contusion (forehead) and laceration (right side of nose) present.   Eyes:      Pupils: Pupils are equal, round, and reactive to light.   Neck:      Comments: Cervical collar in place (hard)  Cardiovascular:      Rate and Rhythm:  Normal rate and regular rhythm.      Pulses: Normal pulses.      Heart sounds: Murmur heard.   Pulmonary:      Effort: Pulmonary effort is normal. Tachypnea present.      Breath sounds: Rhonchi present.   Abdominal:      General: Bowel sounds are normal.      Palpations: Abdomen is soft.   Musculoskeletal:         General: Tenderness (right shoulder) present.      Right lower leg: Edema present.      Left lower leg: Edema present.      Comments: Decreased ROM R shoulder   Skin:     General: Skin is warm and dry.      Findings: Bruising and rash (chronic skin changes to BLE) present.      Comments: Diffuse ecchymosis face and neck   Neurological:      General: No focal deficit present.      Mental Status: He is alert.      Comments: Some increased weakness to left upper extremity postoperatively as compared to prior.  Some mild confusion, but seems to be easy to redirect.     Psychiatric:         Mood and Affect: Mood normal.         Behavior: Behavior normal.       Significant Labs: All pertinent labs within the past 24 hours have been reviewed.  CBC:   Recent Labs   Lab 03/24/23  0447 03/24/23  1608 03/25/23  0456   WBC 6.13 7.69 9.95   HGB 8.4* 9.4* 9.1*   HCT 25.0* 27.6* 26.5*    186 220     CMP:   Recent Labs   Lab 03/24/23  1501 03/24/23  1608 03/25/23  0456    135* 135*   K 4.8 4.6 4.6    104 102   CO2 22* 23 23   * 165* 120*   BUN 25* 25* 26*   CREATININE 1.3 1.3 1.2   CALCIUM 9.1 8.9 9.1   ANIONGAP 10 8 10       Significant Imaging: I have reviewed all pertinent imaging results/findings within the past 24 hours.

## 2023-03-25 NOTE — PLAN OF CARE
03/24/23 2010   Patient Assessment/Suction   Level of Consciousness (AVPU) alert   Respiratory Effort Normal;Unlabored   Expansion/Accessory Muscles/Retractions expansion symmetric   All Lung Fields Breath Sounds diminished;clear   Rhythm/Pattern, Respiratory pattern regular   Cough Frequency no cough   PRE-TX-O2   Device (Oxygen Therapy) nasal cannula   Flow (L/min) 4   SpO2 99 %   Pulse Oximetry Type Intermittent   Aerosol Therapy   $ Aerosol Therapy Charges PRN treatment not required   Incentive Spirometer   $ Incentive Spirometer Charges done with encouragement   Administration (IS) proper technique demonstrated   Number of Repetitions (IS) 10   Level Incentive Spirometer (mL) 1250   Patient Tolerance (IS) good   Preset CPAP/BiPAP Settings   Mode Of Delivery other (see comments)  (pt uses home cpap at night)

## 2023-03-25 NOTE — CLINICAL REVIEW
"Critical access hospital   Hematology/Oncology  Inpatient Clinical Review Note          Patient Name: Richard Bustos  MRN: [id number]  Admission Date: 3/20/2023  Hospital Length of Stay: 3 days  Code Status: Prior   Attending Provider: Bill Gamino MD  Consulting Provider: Kai Ortiz MD  Primary Care Physician: Familia Ramirez Jr, MD  Principal Problem:Spinal cord compression      Subjective:       Patient ID: Richard Bustos is a 77 y.o. male.    Chief Complaint: Extremity Weakness (Pt had a fall Friday at his home and was seen at [facility], since being discharged pt reports weakness in both legs.  /)        History Present Illness:    Patient underwent surgery with Dr Kc yesterday on 3/24 with partial C2 and T1 laminectomy; total C3, C4, C5, C6, C7 laminectomy; and implantation of a cell based allograft        Objective:     Vitals:  Blood pressure (!) 199/81, pulse 86, temperature 96.8 °F (36 °C), resp. rate 16, height 5' 9" (1.753 m), weight 111.1 kg (245 lb), SpO2 96 %.          Lab Review:   Lab Results   Component Value Date    WBC 9.95 03/25/2023    HGB 9.1 (L) 03/25/2023    HCT 26.5 (L) 03/25/2023    MCV 95 03/25/2023     03/25/2023       CMP  Sodium   Date Value Ref Range Status   03/25/2023 135 (L) 136 - 145 mmol/L Final     Potassium   Date Value Ref Range Status   03/25/2023 4.6 3.5 - 5.1 mmol/L Final     Chloride   Date Value Ref Range Status   03/25/2023 102 95 - 110 mmol/L Final     CO2   Date Value Ref Range Status   03/25/2023 23 23 - 29 mmol/L Final     Glucose   Date Value Ref Range Status   03/25/2023 120 (H) 70 - 110 mg/dL Final     BUN   Date Value Ref Range Status   03/25/2023 26 (H) 8 - 23 mg/dL Final     Creatinine   Date Value Ref Range Status   03/25/2023 1.2 0.5 - 1.4 mg/dL Final     Calcium   Date Value Ref Range Status   03/25/2023 9.1 8.7 - 10.5 mg/dL Final     Total Protein   Date Value Ref Range Status   03/20/2023 5.6 (L) 6.0 - 8.4 g/dL Final     Albumin   Date Value Ref " Range Status   03/20/2023 2.4 (L) 3.5 - 5.2 g/dL Final     Total Bilirubin   Date Value Ref Range Status   03/20/2023 0.5 0.1 - 1.0 mg/dL Final     Comment:     For infants and newborns, interpretation of results should be based  on gestational age, weight and in agreement with clinical  observations.    Premature Infant recommended reference ranges:  Up to 24 hours.............<8.0 mg/dL  Up to 48 hours............<12.0 mg/dL  3-5 days..................<15.0 mg/dL  6-29 days.................<15.0 mg/dL       Alkaline Phosphatase   Date Value Ref Range Status   03/20/2023 91 55 - 135 U/L Final     AST   Date Value Ref Range Status   03/20/2023 46 (H) 10 - 40 U/L Final     ALT   Date Value Ref Range Status   03/20/2023 20 10 - 44 U/L Final     Anion Gap   Date Value Ref Range Status   03/25/2023 10 8 - 16 mmol/L Final     eGFR   Date Value Ref Range Status   03/25/2023 >60 >60 mL/min/1.73 m^2 Final              Radiology Diagnostic Studies:           Assessment:     IMPRESSION:    (1) 77 y.o. 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 He was last seen in Hem/onc clinic back in Sept 2022. He was previously under the care of Dr Krunal Rodriguez with hematology/oncology at [facility] for several years.  He has been on coumadin therapy per direction of Dr Tate with cardiology for his cardiac issues and is monitored by [facility]. I do not direct or manage his coumadinization. He had recent fall at home and was inpatient at [facility] in [place]. After discharge from [facility], he presented to [facility] with weakness in lower extremities. MRI's are on chart of the spine. He has been seen by Dr Kike Kc with neurosurgery and is being considered for surgery at some point in the future.     3/25/2023:  - Patient underwent surgery with Dr Kc yesterday on 3/24 with partial C2 and T1 laminectomy; total C3,  C4, C5, C6, C7 laminectomy; and implantation of a cell based allograft  - wbc WNL; hgb at 9.1; plats wnl at 220,000  - cardiology following     (2) CAD/atrial fibrillation and CHF - followed by Dr Tate with [facility]  - He has been on coumadin therapy per direction of Dr Tate with cardiology for his cardiac issues and is monitored by [facility].  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   - He was previously under the care of Dr Krunal Rodriguez with hematology/oncology at [facility] for several years  - He has been on coumadin therapy per direction of Dr Tate with cardiology  for his cardiac issues and is monitored by [facility].  - I do not monitor or direct his coumadinization     (4) CRI stage 3B - followed by Nephrology       (5) Chronic multifactorial anemia with underlying anemia of chronic disorders and chronic renal disease; chronic GIB     (6) Diabetes with Diabetic ulcer/ left toe issues/chronic stasis ulcers/dermatitis - followed by Dr Torrez with podiatry     (7) Prostate issues - followed by Dr Moss     (8) GERD          1. Difficulty walking    2. Generalized weakness    3. Pneumonia    4. Chest pain    5. CAD (coronary artery disease)    6. Pre-operative clearance    7. Spinal cord compression [G95.20 (ICD-10-CM)]    8. Encounter for screening for cardiovascular disorders    9. Spinal cord compression    10. Coronary artery disease involving native coronary artery of native heart without angina pectoris    11. Cervical stenosis of spine    12. Closed nondisplaced fracture of sixth cervical vertebra, unspecified fracture morphology, initial encounter           Plan:     PLAN:          Monitor labs and transfuse as needed  Coumadin and/or anticoagulation management as per  cardiology directives  Post-op management as per Dr Kc  4. Will continue to follow at distance - please call should you need hematology for anything                Kai Ortiz MD  Hematology/Oncology  [facility]

## 2023-03-25 NOTE — PROGRESS NOTES
[facility] Hospital Medicine  Progress Note    Patient Name: Richard Bustos  MRN: [id number]  Patient Class: IP- Inpatient   Admission Date: 3/20/2023  Length of Stay: 3 days  Attending Physician: Bill Gamino MD  Primary Care Provider: Familia Ramirez Jr, MD        Subjective:     Principal Problem:Spinal cord compression        HPI:  Richard Bustos is a 76 y/o M with a past medical history significant for anemia, CAD, CHF, CKD, atrial fibrillation on warfarin, DM2, HLD, HTN, and GERD who presented to the ED for further evaluation of BLE weakness which began following a fall 3 days ago.  Pt reports that he was walking down a ramp at home when he slipped due to the ramp being wet.  He fell to his knees, but continued the fall onto his face and head and had positive loss of consciousness.  He was initially seen at [facility] where he was transferred to [facility].  He reports an extensive workup with no fractures seen and was ultimately discharged home.  He reports difficulty bearing weight since the fall and feels substantially weaker than his baseline.  He reports that he normally uses a cane for ambulation, but has not been able to ambulate at all since his fall 3 days ago.  Today in the ED, CT head is significant for frontal scalp hematoma with no acute intracranial injury seen; bilateral nasal bone fractures are noted.  CXR is concerning for pneumonia.  He will be placed in observation under the service of hospital medicine for continued monitoring and treatment.      Overview/Hospital Course:  Richard Bustos was monitored closely during his hospitalization.  He was placed on IV rocephin and IV azithromycin in treatment of pneumonia.  An xray of his right shoulder was performed due to continued c/o pain and decreased ROM following his fall.  PT/OT were consulted.  An MRI of the brain was obtained with no acute findings.  MRI cervical spine concerning for C6 fractures, neural foraminal and spinal canal  stenosis, severe cansl stenosis cord compression C5-6 and mild to moderate cord flattening at C3-4, and C4-5, and other findings compatible with possible trauma as correlates with given history.  MRI lumbar spine showed multilevel degenerative changes, spinal canal stenosis and neural foraminal stenosis.  It was the recommendation of the neurosurgeon that pt undergo posterior cervical decompression/fusion C3-T1 with lumbar surgery to follow at a later date.  Due to the complexity of his health history, cardiology and hematology were consulted.  ASA and plavix were held.       Interval History: surgical procedure yesterday; he is awake and alert today, talking to his son on the telephone.  Mildly confused, but seems to be easily redirected; following commands.    Review of Systems   Constitutional:  Positive for fatigue. Negative for chills and fever.   HENT:  Negative for congestion.    Respiratory:  Negative for chest tightness and shortness of breath.    Cardiovascular:  Negative for chest pain and palpitations.   Gastrointestinal:  Negative for abdominal pain, diarrhea, nausea and vomiting.   Genitourinary:  Negative for dysuria and hematuria.   Musculoskeletal:  Positive for arthralgias, gait problem and myalgias.   Skin:  Positive for color change and wound.   Neurological:  Positive for weakness. Negative for headaches.   Hematological:  Bruises/bleeds easily.   Psychiatric/Behavioral:  Negative for agitation and confusion.    All other systems reviewed and are negative.  Objective:     Vital Signs (Most Recent):  Temp: 96.6 °F (35.9 °C) (03/25/23 1023)  Pulse: 83 (03/25/23 1023)  Resp: 18 (03/25/23 1023)  BP: 120/72 (03/25/23 1023)  SpO2: (!) 94 % (03/25/23 1023)   Vital Signs (24h Range):  Temp:  [96.6 °F (35.9 °C)-98.1 °F (36.7 °C)] 96.6 °F (35.9 °C)  Pulse:  [] 83  Resp:  [6-34] 18  SpO2:  [94 %-100 %] 94 %  BP: (120-199)/(72-99) 120/72  Arterial Line BP: (162-172)/(76-84) 172/80     Weight: 111.1 kg  (245 lb)  Body mass index is 36.18 kg/m².    Intake/Output Summary (Last 24 hours) at 3/25/2023 1108  Last data filed at 3/25/2023 0628  Gross per 24 hour   Intake 1390 ml   Output 2045 ml   Net -655 ml      Physical Exam  Constitutional:       General: He is not in acute distress.     Appearance: He is well-developed. He is obese.   HENT:      Head: Contusion (forehead) and laceration (right side of nose) present.   Eyes:      Pupils: Pupils are equal, round, and reactive to light.   Neck:      Comments: Cervical collar in place (hard)  Cardiovascular:      Rate and Rhythm: Normal rate and regular rhythm.      Pulses: Normal pulses.      Heart sounds: Murmur heard.   Pulmonary:      Effort: Pulmonary effort is normal. Tachypnea present.      Breath sounds: Rhonchi present.   Abdominal:      General: Bowel sounds are normal.      Palpations: Abdomen is soft.   Musculoskeletal:         General: Tenderness (right shoulder) present.      Right lower leg: Edema present.      Left lower leg: Edema present.      Comments: Decreased ROM R shoulder   Skin:     General: Skin is warm and dry.      Findings: Bruising and rash (chronic skin changes to BLE) present.      Comments: Diffuse ecchymosis face and neck   Neurological:      General: No focal deficit present.      Mental Status: He is alert.      Comments: Some increased weakness to left upper extremity postoperatively as compared to prior.  Some mild confusion, but seems to be easy to redirect.     Psychiatric:         Mood and Affect: Mood normal.         Behavior: Behavior normal.       Significant Labs: All pertinent labs within the past 24 hours have been reviewed.  CBC:   Recent Labs   Lab 03/24/23  0447 03/24/23  1608 03/25/23  0456   WBC 6.13 7.69 9.95   HGB 8.4* 9.4* 9.1*   HCT 25.0* 27.6* 26.5*    186 220     CMP:   Recent Labs   Lab 03/24/23  1501 03/24/23  1608 03/25/23  0456    135* 135*   K 4.8 4.6 4.6    104 102   CO2 22* 23 23   GLU  145* 165* 120*   BUN 25* 25* 26*   CREATININE 1.3 1.3 1.2   CALCIUM 9.1 8.9 9.1   ANIONGAP 10 8 10       Significant Imaging: I have reviewed all pertinent imaging results/findings within the past 24 hours.      Assessment/Plan:      * Spinal cord compression  POD 1 s/p 1. Partial C2 and T1 laminectomy, medial facetectomy   2. Total C3, C4, C5, C6, C7 laminectomy, medial facetectomy  3. Posterior instrumentation bilateral C3-T2 using stereotactic navigation  4. Posterolateral arthrodesis C3-4, C4-5, C5-6, C6-7, C7-T1, T1-T2  5. Harvesting local autograft  6. Implantation of cell based allograft  7. Intraoperative neuro monitoring with Dr. Kc  Continue to follow recommendations .  Follow hemoglobin and hematocrit closely.  Pain control   Physical therapy as per neurosurgery instruction with fall precautions.          Pneumonia  IV rocephin-completed 3 day course azithromycin  brochodilators prn  Supplemental oxygen as needed   Check procalcitonin-WNL  Continue IS post operatively      Multiple skin tears  Present on admission s/p fall  Wound care      Closed nondisplaced fracture of sixth cervical vertebra  MRI brain/cervical spine/lumbar spine-reviewed  Consult neurosurgery-procedure as described above.      Warfarin anticoagulation  On hold 2/2 surgery.  Resume when OK with surgeon/cardiology.    Chronic systolic congestive heart failure  Patient is identified as having Systolic (HFrEF) heart failure that is Chronic. CHF is currently controlled. Latest ECHO performed and demonstrates- Results for orders placed during the hospital encounter of 10/22/21    Echo    Interpretation Summary  · The left ventricle is mildly enlarged with mild concentric hypertrophy and mildly decreased systolic function.  · The estimated ejection fraction is 40%.  · Grade I left ventricular diastolic dysfunction.  · There are segmental left ventricular wall motion abnormalities. There is mid-inferior dyskinesis  · Mild tricuspid  regurgitation.  · Severe left atrial enlargement.  · Mild right ventricular enlargement with normal right ventricular systolic function.  . Continue Beta Blocker and monitor clinical status closely. Monitor on telemetry. Patient is off CHF pathway.  Monitor strict Is&Os and daily weights.  Continue to stress to patient importance of self efficacy and  on diet for CHF. Last BNP reviewed- and noted below No results for input(s): BNP, BNPTRIAGEBLO in the last 168 hours..      Anemia due to stage 3 chronic kidney disease  Patient's anemia is currently controlled. S/p 0 units of PRBCs.  Current CBC reviewed-   Lab Results   Component Value Date    HGB 9.1 (L) 03/25/2023    HCT 26.5 (L) 03/25/2023    MCV 95 03/25/2023     03/25/2023     Monitor serial CBC and transfuse if patient becomes hemodynamically unstable, symptomatic or H/H drops below 7/21.         Generalized weakness  Etiology unclear.  Check CPK-mildly elevated.  Fluids given overnight-trend; now WNL.     PT/OT consult  Maintain fall precautions  Consult neurology/neurosurgery        Stasis dermatitis of both legs  chronic      Paroxysmal atrial fibrillation  Patient with Long standing persistent (>12 months) atrial fibrillation which is controlled currently with Beta Blocker. Patient is currently in atrial fibrillation.[id number] Score: 4.  Anticoagulation indicated. Anticoagulation done with warfarin chronically on hold 2/2 surgery.        CKD (chronic kidney disease) stage 3, GFR 30-59 ml/min, 41  Creatine stable for now. BMP reviewed- noted Estimated Creatinine Clearance: 65.4 mL/min (based on SCr of 1.2 mg/dL). according to latest data. Monitor UOP and serial BMP and adjust therapy as needed. Renally dose meds.          GERD (gastroesophageal reflux disease)  Chronic; continue home pepcid      CAD (coronary artery disease)  Cardiology consulted for cardiac clearance-stress test completed and cleared for surgery.      The ECG portion of the  study is negative for ischemia.    There were no arrhythmias during stress.    The nuclear portion of this study will be reported separately.    1. No confluent evidence of pharmacologically induced reversible ischemia.  2. Normal left ventricular wall motion.  3. Calculated ejection fraction of 53%    Continue to monitor telemetry postoperatively           Hypertension associated with diabetes  Chronic, controlled.  Latest blood pressure and vitals reviewed-   Temp:  [96.6 °F (35.9 °C)-98.1 °F (36.7 °C)]   Pulse:  []   Resp:  [6-34]   BP: (120-199)/(72-99)   SpO2:  [94 %-100 %]   Arterial Line BP: (162-172)/(76-84) .   Home meds for hypertension were reviewed and noted below.   Hypertension Medications             carvediloL (COREG) 25 MG tablet Take 1 tablet (25 mg total) by mouth 2 (two) times daily with meals.    lisinopriL (PRINIVIL,ZESTRIL) 40 MG tablet Take 1 tablet (40 mg total) by mouth every evening.    nitroGLYCERIN 0.4 MG/DOSE TL SPRY (NITROLINGUAL) 400 mcg/spray spray PLACE 1 SPRAY UNDER THE TONGUE EVERY 5 MINUTES AS NEEDED FOR CHEST PAIN          While in the hospital, will manage blood pressure as follows; Adjust home antihypertensive regimen as follows- continue chronic beta blocker; will hold ACE for now due to recent EMMY.  Monitor creatinine and resume lisinopril when proven stable.-resume nightly lisinopril and continue to monitor.    Will utilize p.r.n. blood pressure medication only if patient's blood pressure greater than  180/110 and he develops symptoms such as worsening chest pain or shortness of breath.      MTHFR mutation (methylenetetrahydrofolate reductase)  Consult hematology for recommendations regarding anticoagulation-daughter prefers to avoid lovenox-hematology states they do not manage-will defer to cardiology.  Placed on IV heparin drip-warfarin stopped  Further anticoagulation held for now 2/2 surgery  Will resume as recommended by surgeon/cardiology      Diabetes  mellitus with chronic kidney disease, stage III  Not on chronic meds since bariatric surgery.  Monitor daily glucose-has been controlled.      VTE Risk Mitigation (From admission, onward)         Ordered     Place sequential compression device  Until discontinued         03/24/23 0621     Place sequential compression device  Until discontinued         03/20/23 1929     Reason for No Pharmacological VTE Prophylaxis  Once        Question:  Reasons:  Answer:  Already adequately anticoagulated on oral Anticoagulants    03/20/23 1929                Discharge Planning   ORACIO: 3/28/2023     Code Status: Prior   Is the patient medically ready for discharge?:     Reason for patient still in hospital (select all that apply): Patient trending condition, Treatment, Consult recommendations, PT / OT recommendations and Pending disposition  Discharge Plan A: Skilled Nursing Facility                  RICK Nassar  Department of Hospital Medicine   [facility]

## 2023-03-25 NOTE — RESPIRATORY THERAPY
03/25/23 0750   Patient Assessment/Suction   Level of Consciousness (AVPU) alert   Respiratory Effort Normal;Unlabored   Expansion/Accessory Muscles/Retractions no retractions;no use of accessory muscles   All Lung Fields Breath Sounds Anterior:;Lateral:;clear   Rhythm/Pattern, Respiratory no shortness of breath reported;depth regular;pattern regular;unlabored   Cough Frequency no cough   PRE-TX-O2   Device (Oxygen Therapy) room air   SpO2 95 %   Pulse Oximetry Type Intermittent   $ Pulse Oximetry - Multiple Charge Pulse Oximetry - Multiple   Pulse 110   Resp 18   Incentive Spirometer   $ Incentive Spirometer Charges done with encouragement   Administration (IS) instruction provided, follow-up;proper technique demonstrated   Number of Repetitions (IS) 10   Level Incentive Spirometer (mL) 1500   Patient Tolerance (IS) good;no adverse signs/symptoms present

## 2023-03-25 NOTE — ASSESSMENT & PLAN NOTE
POD 1 s/p 1. Partial C2 and T1 laminectomy, medial facetectomy   2. Total C3, C4, C5, C6, C7 laminectomy, medial facetectomy  3. Posterior instrumentation bilateral C3-T2 using stereotactic navigation  4. Posterolateral arthrodesis C3-4, C4-5, C5-6, C6-7, C7-T1, T1-T2  5. Harvesting local autograft  6. Implantation of cell based allograft  7. Intraoperative neuro monitoring with Dr. Kc  Continue to follow recommendations .  Follow hemoglobin and hematocrit closely.  Pain control   Physical therapy as per neurosurgery instruction with fall precautions.

## 2023-03-25 NOTE — PLAN OF CARE
Problem: Physical Therapy  Goal: Physical Therapy Goal  Description: Goals to be met by: 2023     Patient will increase functional independence with mobility by performin. Supine to sit with MInimal Assistance  2. Sit to stand transfer with Moderate Assistance  3. Bed to chair transfer with Maximum Assistance using Rolling Walker  4. Gait  x 10 feet with Maximum Assistance using Rolling Walker.   5. Sitting at edge of bed x20 minutes with Minimal Assistance  3. Lower extremity exercise program x20 reps     3/25/23:  Patient reevaluated for PT this morning and all goals remain appropriate.  Outcome: Ongoing, Progressing

## 2023-03-25 NOTE — ASSESSMENT & PLAN NOTE
Creatine stable for now. BMP reviewed- noted Estimated Creatinine Clearance: 65.4 mL/min (based on SCr of 1.2 mg/dL). according to latest data. Monitor UOP and serial BMP and adjust therapy as needed. Renally dose meds.

## 2023-03-25 NOTE — PROGRESS NOTES
Neurosurgery  History & Physical    SUBJECTIVE:     Chief Complaint:  UE weakness    History of Present Illness:  Pt reports no issues overnight.  His drain was pulled out by accident and no longer holds suction.  Nurse removed it this AM. He reports neck pain. He reports no change in UE weakness today.  He reports that his legs are stable.     Review of patient's allergies indicates:   Allergen Reactions    Donepezil Other (See Comments)     AMS    Bactroban [mupirocin calcium] Blisters     Causes Blisters    Shellfish containing products Other (See Comments)     Other reaction(s): Gout  OYSTERS       Current Facility-Administered Medications   Medication Dose Route Frequency Provider Last Rate Last Admin    0.9%  NaCl infusion (for blood administration)   Intravenous Q24H PRN RICK Zaidi        acetaminophen tablet 650 mg  650 mg Oral Q6H PRN Kike Kc,         albuterol sulfate nebulizer solution 2.5 mg  2.5 mg Nebulization Q4H PRN Bill Gamino MD        allopurinoL tablet 100 mg  100 mg Oral QHS ELEANOR ZaidiP   100 mg at 03/24/23 2020    aluminum-magnesium hydroxide-simethicone 200-200-20 mg/5 mL suspension 30 mL  30 mL Oral QID PRN ELEANOR ZaidiP        amiodarone tablet 200 mg  200 mg Oral BID Adeline Dewitt NP   200 mg at 03/25/23 0948    bisacodyL suppository 10 mg  10 mg Rectal Daily Kike Kc,         calcitRIOL capsule 0.75 mcg  0.75 mcg Oral Daily ELEANOR ZaidiP   0.75 mcg at 03/25/23 0947    carvediloL tablet 25 mg  25 mg Oral BID  Fern Mix FNP   25 mg at 03/25/23 0947    dextrose 10% bolus 125 mL 125 mL  12.5 g Intravenous PRN Fern Mix, ELEANORP        dextrose 10% bolus 250 mL 250 mL  25 g Intravenous PRN Fern Mix, ELEANORP        dextrose 40 % gel 15,000 mg  15 g Oral PRN Fern Mix, ELEANORP        dextrose 40 % gel 30,000 mg  30 g Oral PRN Fern Mix, ELEANORP        famotidine tablet 40 mg   40 mg Oral Daily Fern Mix, ELEANORP   40 mg at 03/25/23 0947    ferrous sulfate tablet 1 each  1 tablet Oral BID Fern Mix, ELEANORP   1 each at 03/25/23 0948    fluticasone propionate 50 mcg/actuation nasal spray 100 mcg  2 spray Each Nostril Daily PRN Fern Mix, RICK        glucagon (human recombinant) injection 1 mg  1 mg Intramuscular PRN Fern Mix, ELEANORP        HYDROcodone-acetaminophen 5-325 mg per tablet 1 tablet  1 tablet Oral Q6H PRN Fern Mix, ELEANORP   1 tablet at 03/25/23 0051    HYDROmorphone (PF) injection 2 mg  2 mg Intravenous Q3H PRN Kike Kc, DO        ipratropium 0.02 % nebulizer solution 0.5 mg  0.5 mg Nebulization Q4H PRN Bill Gamino MD        lisinopriL tablet 40 mg  40 mg Oral QHS Fern Mix, ELEANORP   40 mg at 03/24/23 2020    melatonin tablet 6 mg  6 mg Oral Nightly PRN ELEANOR ZaidiP   6 mg at 03/23/23 2109    methocarbamoL (ROBAXIN) 1,000 mg in dextrose 5 % (D5W) 100 mL IVPB  1,000 mg Intravenous Q8H Kike Kc, DO   Stopped at 03/25/23 0654    mupirocin 2 % ointment   Nasal BID Kike Kc, DO   Given at 03/25/23 0948    naloxone 0.4 mg/mL injection 0.02 mg  0.02 mg Intravenous PRN Fern Mix, RICK        ondansetron disintegrating tablet 8 mg  8 mg Oral Q6H PRN Kike Kc, DO        ondansetron injection 4 mg  4 mg Intravenous Q8H PRN Fern Mix, RICK        oxyCODONE immediate release tablet Tab 10 mg  10 mg Oral Q3H PRN Kike Kc, DO        oxyCODONE-acetaminophen  mg per tablet 1 tablet  1 tablet Oral Q4H PRN Kike Kc, DO        prochlorperazine injection Soln 5 mg  5 mg Intravenous Q6H PRN Kike Kc, DO        senna-docusate 8.6-50 mg per tablet 1 tablet  1 tablet Oral BID Fern Mix, RICK   1 tablet at 03/24/23 2020    senna-docusate 8.6-50 mg per tablet 2 tablet  2 tablet Oral Nightly PRN Kike Kc, DO        simethicone chewable tablet 80 mg  1  tablet Oral QID PRN Fern NIHARIKA Mooreski, FNP        sodium chloride 0.9% flush 10 mL  10 mL Intravenous Q12H PRN Fern NIHARIKA Mooreski, FNP        sodium chloride 0.9% flush 10 mL  10 mL Intravenous Q6H Fern RODNEY. Franceski, FNP   10 mL at 03/25/23 0600    And    sodium chloride 0.9% flush 10 mL  10 mL Intravenous PRN Fern NIHARIKA Franceski, FNP        traZODone tablet 50 mg  50 mg Oral QHS Fern NIHARIKA Mooreski, FNP   50 mg at 03/24/23 2020     Facility-Administered Medications Ordered in Other Encounters   Medication Dose Route Frequency Provider Last Rate Last Admin    lactated ringers infusion   Intravenous Once PRN Evangelist Bose MD           Past Medical History:   Diagnosis Date    Anemia     Anemia of other chronic disease 09/13/2017    Anemia, chronic renal failure 09/13/2017    Anemia, unspecified 09/13/2017    Anticoagulant long-term use     Aorta aneurysm     Arthritis     Atrial fibrillation     Bacteremia due to Streptococcus 01/08/2022    CAD (coronary artery disease)     CHF (congestive heart failure)     Chronic kidney disease     Clotting disorder 09/13/2017    Colon polyp     Dementia     Diabetes mellitus     not on meds    Diabetes mellitus type II     Diverticulosis     Elevated PSA     Encounter for blood transfusion     Former smoker     General anesthetics causing adverse effect in therapeutic use     TROUBLE COMING OUT OF ANESTHESIA WITH GASTRIC BYPASS    GERD (gastroesophageal reflux disease)     Gout     Hx of colonic polyps     Hypercoagulable state     Hyperlipidemia     Hypertension     MI, old 2010    MTHFR mutation     Myocardial infarction     9/2010    Osteomyelitis of ankle or foot 03/09/2017    Prostate cancer 06/2016    Sleep apnea     Squamous cell carcinoma 01/23/2018    Left helix, imiquimod    Venous stasis     Chronic     Past Surgical History:   Procedure Laterality Date    ABDOMINAL SURGERY      CARDIAC SURGERY      3 STENTS     CAUDAL EPIDURAL STEROID INJECTION N/A  6/22/2020    Procedure: INJECTION, STEROID, SPINE, EPIDURAL, CAUDAL;  Surgeon: Evangelist Bose MD;  Location: [facility] OR;  Service: Pain Management;  Laterality: N/A;    COLONOSCOPY  02/2011    diverticulosis with diverticula with clot, no active bleeding    COLONOSCOPY N/A 8/24/2016    Procedure: COLONOSCOPY;  Surgeon: Saroj Borjas MD;  Location: [facility] ENDO;  Service: Endoscopy;  Laterality: N/A;    COLONOSCOPY N/A 11/18/2020    Procedure: COLONOSCOPY;  Surgeon: Saroj Borjas MD;  Location: [facility] ENDO;  Service: Endoscopy;  Laterality: N/A;    COLONOSCOPY N/A 10/27/2021    Procedure: COLONOSCOPY;  Surgeon: Saroj Borjas MD;  Location: [facility] ENDO;  Service: Endoscopy;  Laterality: N/A;    EPIDURAL STEROID INJECTION INTO LUMBAR SPINE N/A 6/22/2020    Procedure: Injection-steroid-epidural-lumbar;  Surgeon: Evangelist Bose MD;  Location: [facility] OR;  Service: Pain Management;  Laterality: N/A;  L3 L4 L5 S1    EPIDURAL STEROID INJECTION INTO LUMBAR SPINE N/A 9/21/2020    Procedure: Injection-steroid-epidural-lumbar;  Surgeon: Evangelist Bose MD;  Location: [facility] OR;  Service: Pain Management;  Laterality: N/A;  L5-S1    GASTRIC BYPASS  2/5/2008    IVC FILTER RETRIEVAL      JOINT REPLACEMENT  1996 and 2001    bi-lat hip replacement/Rt Hip and Lt Hip    RADIOFREQUENCY ABLATION OF LUMBAR MEDIAL BRANCH NERVE AT SINGLE LEVEL Bilateral 1/10/2020    Procedure: Radiofrequency Ablation, Nerve, Spinal, Lumbar, Medial Branch, 1 Level;  Surgeon: Evangelist Bose MD;  Location: [facility] OR;  Service: Pain Management;  Laterality: Bilateral;  L3,L4,L5    RADIOFREQUENCY ABLATION OF LUMBAR MEDIAL BRANCH NERVE AT SINGLE LEVEL Bilateral 11/23/2021    Procedure: Radiofrequency Ablation, Nerve, Spinal, Lumbar, Medial Branch, Bilateral L 3,4,5;  Surgeon: Nile Causey MD;  Location: [facility] OR;  Service: Pain Management;  Laterality: Bilateral;    ROTATOR CUFF REPAIR      Rt shoulder    Stents  8/18/2010    x 3    UPPER GASTROINTESTINAL  "ENDOSCOPY  02/2011     Family History       Problem Relation (Age of Onset)    Heart attack Mother, Father, Brother    Lupus Daughter    Ulcerative colitis Daughter (35)          Social History     Socioeconomic History    Marital status:    Tobacco Use    Smoking status: Former    Smokeless tobacco: Never    Tobacco comments:     45 yrs ago, only smoked 3-4 packs in his entire life as a teenager   Substance and Sexual Activity    Alcohol use: Not Currently     Comment: rare    Drug use: No    Sexual activity: Not Currently       Review of Systems   Constitutional: Negative.    Neurological:  Positive for weakness. Negative for dizziness, facial asymmetry, light-headedness and headaches.     OBJECTIVE:     Vital Signs  Temp: 96 °F (35.6 °C)  Pulse: 100  Resp: 16  BP: (!) 140/83  SpO2: 97 %  Height: 5' 9" (175.3 cm)  Weight: 111.1 kg (245 lb)  Body mass index is 36.18 kg/m².      Physical Exam:    Constitutional: He appears well-developed and well-nourished.     Eyes: EOM are normal.     Abdominal: Soft.     Musculoskeletal:        Right Upper Extremities: Muscle strength is 3/5.        Left Upper Extremities: Muscle strength is 3/5.       Right Lower Extremities: Muscle strength is 5/5.        Left Lower Extremities: Muscle strength is 5/5.     Neurological:        Sensory: There is no sensory deficit in the trunk. There is no sensory deficit in the extremities.       Diagnostic Results:  X-rays from this AM reviewed personally by me. Shows good hardware placement.    ASSESSMENT/PLAN:     Continue collar.  PT/OT.  Continue pain control.        Note dictated with voice recognition software, please excuse any grammatical errors.   "

## 2023-03-25 NOTE — ASSESSMENT & PLAN NOTE
MRI brain/cervical spine/lumbar spine-reviewed  Consult neurosurgery-procedure as described above.

## 2023-03-25 NOTE — PT/OT/SLP EVAL
Occupational Therapy   Evaluation    Name: Richard Bustos  MRN: [id number]  Admitting Diagnosis: Spinal cord compression  Recent Surgery: Procedure(s) (LRB):  FUSION, SPINE, CERVICAL (N/A) 1 Day Post-Op    Recommendations:     Discharge Recommendations: nursing facility, skilled  Discharge Equipment Recommendations:  other (see comments) (TBD)  Barriers to discharge:       Assessment:     Richard Bustos is a 77 y.o. male with a medical diagnosis of Spinal cord compression.  He presents with the following performance deficits affecting function: weakness, impaired endurance, impaired self care skills, impaired functional mobility, impaired balance, gait instability, decreased upper extremity function, decreased lower extremity function, orthopedic precautions.  New OT orders received. Pt s/p cervical surgery with Aspen collar in place. Pt's Nurse reports some hallucinations today and family concerned about L arm being weaker. Pt alert and oriented to person, month, year, and place. Pt demonstrates AROM R shoulder flexion approximately 60 degrees and distal RUE AROM/strength WFL's. Pt is R hand dominant. Pt is unable to demonstrate AROM L shoulder flexion or AROM L elbow flexion this date. Trace for L shoulder flexion and L elbow flexion. Pt demonstrates L elbow and extension and wrist AROM/strength WFL's. Pt with L hand edema, but otherwise demonstrates AROM/strength L hand slightly limited secondary to edema.  OT provided instruction in  exercises with foam ball. Pt/family verbalized understanding. OT encouraged AROM BUE's and provided gentle AAROM R & L shoulder flexion with Aspen collar in place in reclined postioning with HOB elevated. Pt with no complaints of pain. Support of head/neck maintained. Recommend SNF at discharge. OT provided education in calling for assist.     Rehab Prognosis: Good; patient would benefit from acute skilled OT services to address these deficits and reach maximum level of function.        Plan:     Patient to be seen 5 x/week to address the above listed problems via self-care/home management, therapeutic activities, therapeutic exercises  Plan of Care Expires: 04/22/23  Plan of Care Reviewed with: patient, daughter    Subjective     Chief Complaint: weakness  Patient/Family Comments/goals: To get better    Occupational Profile:  Living Environment: Pt lives with daughter in 1 story home with ramp. Pt has a tub/shower with tub transfer bench and grab bars. Pt has a raised toilet with grab bars.  Previous level of function: Modified Independent with cane. Min/SBA with bathing.  Equipment Used at Home: rollator, bath bench, cane, quad  Assistance upon Discharge: Family    Pain/Comfort:  Pain Rating 1: 0/10  Pain Rating Post-Intervention 1: 0/10    Patients cultural, spiritual, [employer]ism conflicts given the current situation:      Objective:     Communicated with: Nurse Yuen prior to session.  Patient found HOB elevated with bed alarm, peripheral IV, telemetry, Other (comments) (Aspen Collar) upon OT entry to room.    General Precautions: Standard, fall  Orthopedic Precautions: spinal precautions  Braces: Aspen collar  Respiratory Status: Room air    Occupational Performance:      Activities of Daily Living:  Feeding:  moderate assistance    Grooming: maximal assistance    Bathing: maximal assistance    Upper Body Dressing: maximal assistance    Lower Body Dressing: maximal assistance    Toileting: maximal assistance      Cognitive/Visual Perceptual:  Pt's Nurse Alpa reports hallucinations today. Pt was alert and oriented to person, month, year, and place during OT Eval. Pt did acknowledge having hallucinations today, but reported to OT they are not as frequent at this time.    Physical Exam:  Upper Extremity Strength:    -       Right Upper Extremity: Pt R hand dominant. Pt demonstrates ROM R shoulder flexion approximately 60 degrees. Distal RUE AROM/strength WFL's  -       Left Upper Extremity: Pt  with L hand edema. Pt demonstrates Trace for AROM L shoulder flexion and Trace for AROM L elbow flexion. Pt demonstrates L elbow extension and flexion/extension L wrist. WFL's. Pt with L  limited by edema.     AMPAC 6 Click ADL:  AMPAC Total Score: 12    Treatment & Education:  OT provided review of role of OT. Pt verbalized understanding.  OT provided instruction in gentle AAROM R shoulder flexion/extension and PROM L shoulder flexion/extension. OT provided instruction in resistive  exercises with foam ball. All spinal precautions observed. Aspen collar in place. Pt with head/neck supported by pillow/collar/bed at all times in reclined position.   OT provided education in calling for assist. Pt verbalized understanding.    Patient left HOB elevated with all lines intact, call button in reach, bed alarm on, Nurse Alpa notified, and family present    GOALS:   Multidisciplinary Problems       Occupational Therapy Goals          Problem: Occupational Therapy    Goal Priority Disciplines Outcome Interventions   Occupational Therapy Goal     OT, PT/OT     Description: Goals to be met by: 4/22/23     Patient will increase functional independence with ADLs by performing:    Feeding with Set-up Assistance.  UE Dressing with Minimal Assistance.  LE Dressing with Moderate Assistance.  Grooming while seated with Minimal Assistance.  Toileting from bedside commode with Maximum Assistance for hygiene and clothing management.   Bathing from  shower chair/bench with Maximum Assistance.  Toilet transfer to bedside commode with Maximum Assistance.  Increased strength and functional activity tolerance to WFL's for ADL's/IADL's.                         History:     Past Medical History:   Diagnosis Date    Anemia     Anemia of other chronic disease 09/13/2017    Anemia, chronic renal failure 09/13/2017    Anemia, unspecified 09/13/2017    Anticoagulant long-term use     Aorta aneurysm     Arthritis     Atrial fibrillation      Bacteremia due to Streptococcus 01/08/2022    CAD (coronary artery disease)     CHF (congestive heart failure)     Chronic kidney disease     Clotting disorder 09/13/2017    Colon polyp     Dementia     Diabetes mellitus     not on meds    Diabetes mellitus type II     Diverticulosis     Elevated PSA     Encounter for blood transfusion     Former smoker     General anesthetics causing adverse effect in therapeutic use     TROUBLE COMING OUT OF ANESTHESIA WITH GASTRIC BYPASS    GERD (gastroesophageal reflux disease)     Gout     Hx of colonic polyps     Hypercoagulable state     Hyperlipidemia     Hypertension     MI, old 2010    MTHFR mutation     Myocardial infarction     9/2010    Osteomyelitis of ankle or foot 03/09/2017    Prostate cancer 06/2016    Sleep apnea     Squamous cell carcinoma 01/23/2018    Left helix, imiquimod    Venous stasis     Chronic         Past Surgical History:   Procedure Laterality Date    ABDOMINAL SURGERY      CARDIAC SURGERY      3 STENTS     CAUDAL EPIDURAL STEROID INJECTION N/A 6/22/2020    Procedure: INJECTION, STEROID, SPINE, EPIDURAL, CAUDAL;  Surgeon: Evangelist Bose MD;  Location: [facility] OR;  Service: Pain Management;  Laterality: N/A;    COLONOSCOPY  02/2011    diverticulosis with diverticula with clot, no active bleeding    COLONOSCOPY N/A 8/24/2016    Procedure: COLONOSCOPY;  Surgeon: Saroj Borjas MD;  Location: [facility];  Service: Endoscopy;  Laterality: N/A;    COLONOSCOPY N/A 11/18/2020    Procedure: COLONOSCOPY;  Surgeon: Saroj Borjas MD;  Location: [facility];  Service: Endoscopy;  Laterality: N/A;    COLONOSCOPY N/A 10/27/2021    Procedure: COLONOSCOPY;  Surgeon: Saroj Borjas MD;  Location: [facility];  Service: Endoscopy;  Laterality: N/A;    EPIDURAL STEROID INJECTION INTO LUMBAR SPINE N/A 6/22/2020    Procedure: Injection-steroid-epidural-lumbar;  Surgeon: Evangelist Bose MD;  Location: [facility] OR;  Service: Pain Management;  Laterality: N/A;  L3 L4 L5 S1     EPIDURAL STEROID INJECTION INTO LUMBAR SPINE N/A 9/21/2020    Procedure: Injection-steroid-epidural-lumbar;  Surgeon: Evangelist Bose MD;  Location: [facility] OR;  Service: Pain Management;  Laterality: N/A;  L5-S1    GASTRIC BYPASS  2/5/2008    IVC FILTER RETRIEVAL      JOINT REPLACEMENT  1996 and 2001    bi-lat hip replacement/Rt Hip and Lt Hip    RADIOFREQUENCY ABLATION OF LUMBAR MEDIAL BRANCH NERVE AT SINGLE LEVEL Bilateral 1/10/2020    Procedure: Radiofrequency Ablation, Nerve, Spinal, Lumbar, Medial Branch, 1 Level;  Surgeon: Evangelist Bose MD;  Location: [facility] OR;  Service: Pain Management;  Laterality: Bilateral;  L3,L4,L5    RADIOFREQUENCY ABLATION OF LUMBAR MEDIAL BRANCH NERVE AT SINGLE LEVEL Bilateral 11/23/2021    Procedure: Radiofrequency Ablation, Nerve, Spinal, Lumbar, Medial Branch, Bilateral L 3,4,5;  Surgeon: Nile Causey MD;  Location: [facility] OR;  Service: Pain Management;  Laterality: Bilateral;    ROTATOR CUFF REPAIR      Rt shoulder    Stents  8/18/2010    x 3    UPPER GASTROINTESTINAL ENDOSCOPY  02/2011       Time Tracking:     OT Date of Treatment: 03/25/23  OT Start Time: 1024  OT Stop Time: 1111  OT Total Time (min): 47 min    Billable Minutes:Evaluation 8  Therapeutic Exercise 39    3/25/2023

## 2023-03-25 NOTE — ASSESSMENT & PLAN NOTE
Cardiology consulted for cardiac clearance-stress test completed and cleared for surgery.      The ECG portion of the study is negative for ischemia.    There were no arrhythmias during stress.    The nuclear portion of this study will be reported separately.    1. No confluent evidence of pharmacologically induced reversible ischemia.  2. Normal left ventricular wall motion.  3. Calculated ejection fraction of 53%    Continue to monitor telemetry postoperatively

## 2023-03-25 NOTE — PLAN OF CARE
Problem: Adult Inpatient Plan of Care  Goal: Plan of Care Review  Outcome: Ongoing, Progressing  Goal: Patient-Specific Goal (Individualized)  Outcome: Ongoing, Progressing  Goal: Absence of Hospital-Acquired Illness or Injury  Outcome: Ongoing, Progressing  Goal: Optimal Comfort and Wellbeing  Outcome: Ongoing, Progressing  Goal: Readiness for Transition of Care  Outcome: Ongoing, Progressing     Problem: Diabetes Comorbidity  Goal: Blood Glucose Level Within Targeted Range  Outcome: Ongoing, Progressing     Problem: Fluid Imbalance (Pneumonia)  Goal: Fluid Balance  Outcome: Ongoing, Progressing     Problem: Infection (Pneumonia)  Goal: Resolution of Infection Signs and Symptoms  Outcome: Ongoing, Progressing     Problem: Respiratory Compromise (Pneumonia)  Goal: Effective Oxygenation and Ventilation  Outcome: Ongoing, Progressing     Problem: Fall Injury Risk  Goal: Absence of Fall and Fall-Related Injury  Outcome: Ongoing, Progressing     Problem: Skin Injury Risk Increased  Goal: Skin Health and Integrity  Outcome: Ongoing, Progressing     Problem: Impaired Wound Healing  Goal: Optimal Wound Healing  Outcome: Ongoing, Progressing     Problem: Infection  Goal: Absence of Infection Signs and Symptoms  Outcome: Ongoing, Progressing

## 2023-03-25 NOTE — ASSESSMENT & PLAN NOTE
Chronic, controlled.  Latest blood pressure and vitals reviewed-   Temp:  [96.6 °F (35.9 °C)-98.1 °F (36.7 °C)]   Pulse:  []   Resp:  [6-34]   BP: (120-199)/(72-99)   SpO2:  [94 %-100 %]   Arterial Line BP: (162-172)/(76-84) .   Home meds for hypertension were reviewed and noted below.   Hypertension Medications             carvediloL (COREG) 25 MG tablet Take 1 tablet (25 mg total) by mouth 2 (two) times daily with meals.    lisinopriL (PRINIVIL,ZESTRIL) 40 MG tablet Take 1 tablet (40 mg total) by mouth every evening.    nitroGLYCERIN 0.4 MG/DOSE TL SPRY (NITROLINGUAL) 400 mcg/spray spray PLACE 1 SPRAY UNDER THE TONGUE EVERY 5 MINUTES AS NEEDED FOR CHEST PAIN          While in the hospital, will manage blood pressure as follows; Adjust home antihypertensive regimen as follows- continue chronic beta blocker; will hold ACE for now due to recent EMMY.  Monitor creatinine and resume lisinopril when proven stable.-resume nightly lisinopril and continue to monitor.    Will utilize p.r.n. blood pressure medication only if patient's blood pressure greater than  180/110 and he develops symptoms such as worsening chest pain or shortness of breath.

## 2023-03-25 NOTE — ASSESSMENT & PLAN NOTE
Patient's anemia is currently controlled. S/p 0 units of PRBCs.  Current CBC reviewed-   Lab Results   Component Value Date    HGB 9.1 (L) 03/25/2023    HCT 26.5 (L) 03/25/2023    MCV 95 03/25/2023     03/25/2023     Monitor serial CBC and transfuse if patient becomes hemodynamically unstable, symptomatic or H/H drops below 7/21.

## 2023-03-25 NOTE — ASSESSMENT & PLAN NOTE
IV rocephin-completed 3 day course azithromycin  brochodilators prn  Supplemental oxygen as needed   Check procalcitonin-WNL  Continue IS post operatively

## 2023-03-25 NOTE — PLAN OF CARE
Goals to be met by: 4/22/23     Patient will increase functional independence with ADLs by performing:    Feeding with Set-up Assistance.  UE Dressing with Minimal Assistance.  LE Dressing with Moderate Assistance.  Grooming while seated with Minimal Assistance.  Toileting from bedside commode with Maximum Assistance for hygiene and clothing management.   Bathing from  shower chair/bench with Maximum Assistance.  Toilet transfer to bedside commode with Maximum Assistance.  Increased strength and functional activity tolerance to WFL's for ADL's/IADL's.

## 2023-03-25 NOTE — ASSESSMENT & PLAN NOTE
Consult hematology for recommendations regarding anticoagulation-daughter prefers to avoid lovenox-hematology states they do not manage-will defer to cardiology.  Placed on IV heparin drip-warfarin stopped  Further anticoagulation held for now 2/2 surgery  Will resume as recommended by surgeon/cardiology

## 2023-03-25 NOTE — NURSING
0730- Handoff shift report received. Patient awake and oriented x4. Answer neuro questions appropriately. Hallucination present. Seeing ants in room crawling on wall. No reports of pain. Side rails up x3. Due to void.     0945- Patient repositioned in bed. Incontinence brief changed. Urinated x1    1031- Family at bedside. Concerned about hallucinations. Patient reports that he was handcuff last night and taken somewhere. Recheck on vital signs as below. Dr. Gamino notified. No visual or sensory deficits noted. Generalized weakness, but greater on GRISELDA more so than right. Decrease ROM with GRISELDA. No headache. Bruising to BUE and head. Incision CDI.    03/25/23 1023   Vital Signs   Temp 96.6 °F (35.9 °C)   Temp Source Oral   Pulse 83   Heart Rate Source Monitor   Resp 18   SpO2 (!) 94 %   /72   MAP (mmHg) 91   Patient Position Lying     Patient tolerating clear liquid. Diet advanced to full liquid per protocol.    1800- Purposeful rounding this shift. Safety precautions maintained. Side rails up x3, bed low, wheels locked, call light within reacy. PICC intact and saline lock. Telemetry monitor in place. Occasional PVCs noted. Confusion at times with hallucinations. Provider aware. Patient repositioned in bed throughout shift. Attempted to assist to edge of bed with x2 person assist, but patient very weak. PT/OT in to see patient this shift. Family at bedside.

## 2023-03-25 NOTE — NURSING
Patient's hemovac drain will no longer hold suction. Called and spoke to Dr. Worrell with neurosurgery. Dr. Worrell states to pull drain.

## 2023-03-25 NOTE — ASSESSMENT & PLAN NOTE
Patient with Long standing persistent (>12 months) atrial fibrillation which is controlled currently with Beta Blocker. Patient is currently in atrial fibrillation.[id number] Score: 4.  Anticoagulation indicated. Anticoagulation done with warfarin chronically on hold 2/2 surgery.

## 2023-03-26 PROCEDURE — 25000003 PHARM REV CODE 250: Performed by: NEUROLOGICAL SURGERY

## 2023-03-26 PROCEDURE — 94640 AIRWAY INHALATION TREATMENT: CPT

## 2023-03-26 PROCEDURE — 97530 THERAPEUTIC ACTIVITIES: CPT | Mod: CQ

## 2023-03-26 PROCEDURE — 63600175 PHARM REV CODE 636 W HCPCS: Performed by: NEUROLOGICAL SURGERY

## 2023-03-26 PROCEDURE — 94761 N-INVAS EAR/PLS OXIMETRY MLT: CPT

## 2023-03-26 PROCEDURE — 25000003 PHARM REV CODE 250: Performed by: NURSE PRACTITIONER

## 2023-03-26 PROCEDURE — 12000002 HC ACUTE/MED SURGE SEMI-PRIVATE ROOM

## 2023-03-26 PROCEDURE — 99900035 HC TECH TIME PER 15 MIN (STAT)

## 2023-03-26 PROCEDURE — A4216 STERILE WATER/SALINE, 10 ML: HCPCS | Performed by: NURSE PRACTITIONER

## 2023-03-26 PROCEDURE — 97110 THERAPEUTIC EXERCISES: CPT | Mod: CQ

## 2023-03-26 PROCEDURE — 25000242 PHARM REV CODE 250 ALT 637 W/ HCPCS: Performed by: STUDENT IN AN ORGANIZED HEALTH CARE EDUCATION/TRAINING PROGRAM

## 2023-03-26 PROCEDURE — 94799 UNLISTED PULMONARY SVC/PX: CPT

## 2023-03-26 RX ADMIN — METHOCARBAMOL 1000 MG: 100 INJECTION, SOLUTION INTRAMUSCULAR; INTRAVENOUS at 05:03

## 2023-03-26 RX ADMIN — CALCITRIOL CAPSULES 0.25 MCG 0.75 MCG: 0.25 CAPSULE ORAL at 10:03

## 2023-03-26 RX ADMIN — SODIUM CHLORIDE, PRESERVATIVE FREE 10 ML: 5 INJECTION INTRAVENOUS at 05:03

## 2023-03-26 RX ADMIN — LISINOPRIL 40 MG: 40 TABLET ORAL at 08:03

## 2023-03-26 RX ADMIN — CARVEDILOL 25 MG: 25 TABLET, FILM COATED ORAL at 10:03

## 2023-03-26 RX ADMIN — BISACODYL 10 MG: 10 SUPPOSITORY RECTAL at 10:03

## 2023-03-26 RX ADMIN — ALBUTEROL SULFATE 2.5 MG: 2.5 SOLUTION RESPIRATORY (INHALATION) at 07:03

## 2023-03-26 RX ADMIN — SODIUM CHLORIDE, PRESERVATIVE FREE 10 ML: 5 INJECTION INTRAVENOUS at 12:03

## 2023-03-26 RX ADMIN — METHOCARBAMOL 1000 MG: 100 INJECTION, SOLUTION INTRAMUSCULAR; INTRAVENOUS at 10:03

## 2023-03-26 RX ADMIN — IPRATROPIUM BROMIDE 0.5 MG: 0.5 SOLUTION RESPIRATORY (INHALATION) at 07:03

## 2023-03-26 RX ADMIN — AMIODARONE HYDROCHLORIDE 200 MG: 200 TABLET ORAL at 08:03

## 2023-03-26 RX ADMIN — FERROUS SULFATE TAB 325 MG (65 MG ELEMENTAL FE) 1 EACH: 325 (65 FE) TAB at 08:03

## 2023-03-26 RX ADMIN — FAMOTIDINE 40 MG: 20 TABLET ORAL at 10:03

## 2023-03-26 RX ADMIN — SODIUM CHLORIDE, PRESERVATIVE FREE 10 ML: 5 INJECTION INTRAVENOUS at 06:03

## 2023-03-26 RX ADMIN — AMIODARONE HYDROCHLORIDE 200 MG: 200 TABLET ORAL at 10:03

## 2023-03-26 RX ADMIN — FERROUS SULFATE TAB 325 MG (65 MG ELEMENTAL FE) 1 EACH: 325 (65 FE) TAB at 10:03

## 2023-03-26 RX ADMIN — CARVEDILOL 25 MG: 25 TABLET, FILM COATED ORAL at 05:03

## 2023-03-26 RX ADMIN — MUPIROCIN: 20 OINTMENT TOPICAL at 08:03

## 2023-03-26 RX ADMIN — TRAZODONE HYDROCHLORIDE 50 MG: 50 TABLET ORAL at 08:03

## 2023-03-26 RX ADMIN — DOCUSATE SODIUM AND SENNOSIDES 1 TABLET: 8.6; 5 TABLET, FILM COATED ORAL at 08:03

## 2023-03-26 RX ADMIN — ALLOPURINOL 100 MG: 100 TABLET ORAL at 08:03

## 2023-03-26 RX ADMIN — MUPIROCIN: 20 OINTMENT TOPICAL at 10:03

## 2023-03-26 RX ADMIN — DOCUSATE SODIUM AND SENNOSIDES 1 TABLET: 8.6; 5 TABLET, FILM COATED ORAL at 10:03

## 2023-03-26 NOTE — ASSESSMENT & PLAN NOTE
Creatine stable for now. BMP reviewed- noted Estimated Creatinine Clearance: 65.6 mL/min (based on SCr of 1.2 mg/dL). according to latest data. Monitor UOP and serial BMP and adjust therapy as needed. Renally dose meds.

## 2023-03-26 NOTE — PROGRESS NOTES
[facility] Hospital Medicine  Progress Note    Patient Name: Richard Bustos  MRN: [id number]  Patient Class: IP- Inpatient   Admission Date: 3/20/2023  Length of Stay: 4 days  Attending Physician: Bill Gamino MD  Primary Care Provider: Familia Ramirez Jr, MD        Subjective:     Principal Problem:Spinal cord compression        HPI:  Richard Bustos is a 78 y/o M with a past medical history significant for anemia, CAD, CHF, CKD, atrial fibrillation on warfarin, DM2, HLD, HTN, and GERD who presented to the ED for further evaluation of BLE weakness which began following a fall 3 days ago.  Pt reports that he was walking down a ramp at home when he slipped due to the ramp being wet.  He fell to his knees, but continued the fall onto his face and head and had positive loss of consciousness.  He was initially seen at [facility] ED where he was [facility] to [facility].  He reports an extensive workup with no fractures seen and was ultimately discharged home.  He reports difficulty bearing weight since the fall and feels substantially weaker than his baseline.  He reports that he normally uses a cane for ambulation, but has not been able to ambulate at all since his fall 3 days ago.  Today in the ED, CT head is significant for frontal scalp hematoma with no acute intracranial injury seen; bilateral nasal bone fractures are noted.  CXR is concerning for pneumonia.  He will be placed in observation under the service of hospital medicine for continued monitoring and treatment.      Overview/Hospital Course:  Richard Bustos was monitored closely during his hospitalization.  He was placed on IV rocephin and IV azithromycin in treatment of pneumonia.  An xray of his right shoulder was performed due to continued c/o pain and decreased ROM following his fall with no fracture or dislocation seen.  PT/OT were consulted.  An MRI of the brain was obtained with no acute findings.  MRI cervical spine concerning for C6 fractures,  neural foraminal and spinal canal stenosis, severe cansl stenosis cord compression C5-6 and mild to moderate cord flattening at C3-4, and C4-5, and other findings compatible with possible trauma as correlates with given history.  MRI lumbar spine showed multilevel degenerative changes, spinal canal stenosis and neural foraminal stenosis.  It was the recommendation of the neurosurgeon that pt undergo posterior cervical decompression/fusion C3-T1 with lumbar surgery to follow at a later date.  Due to the complexity of his health history, cardiology and hematology were consulted.  ASA and plavix were held. Coumadin was held and he was placed on a heparin drip in anticipation of surgery.  Cardiology recommended nuclear med stress test.  He was transferred to [facility] for come and go procedure-NM stress test with no reversible ischemia noted, so he received cardiac clearance for surgery.  On 3/24/23, Dr. Kc performed laminectomy, medial facetectomy of C2-T1.  Postoperatively, he continued to work with PT/OT.  Hard cervical collar was kept in place.  Pain was controlled with IV and oral narcotics.  He experienced some bouts of confusion, but was easily reoriented.        Interval History: no acute events overnight.  Remains somewhat disoriented; easily redirected.    Review of Systems   Constitutional:  Positive for fatigue. Negative for chills and fever.   HENT:  Negative for congestion.    Respiratory:  Negative for chest tightness and shortness of breath.    Cardiovascular:  Negative for chest pain and palpitations.   Gastrointestinal:  Negative for abdominal pain, diarrhea, nausea and vomiting.   Genitourinary:  Negative for dysuria and hematuria.   Musculoskeletal:  Positive for arthralgias, gait problem and myalgias.   Skin:  Positive for color change and wound.   Neurological:  Positive for weakness. Negative for headaches.   Hematological:  Bruises/bleeds easily.   Psychiatric/Behavioral:  Positive for confusion.  Negative for agitation.    All other systems reviewed and are negative.  Objective:     Vital Signs (Most Recent):  Temp: 97.3 °F (36.3 °C) (03/26/23 1140)  Pulse: 66 (03/26/23 1140)  Resp: 20 (03/26/23 1140)  BP: 132/72 (03/26/23 1140)  SpO2: (!) 93 % (03/26/23 1140)   Vital Signs (24h Range):  Temp:  [97.3 °F (36.3 °C)-99 °F (37.2 °C)] 97.3 °F (36.3 °C)  Pulse:  [] 66  Resp:  [12-20] 20  SpO2:  [92 %-95 %] 93 %  BP: (125-177)/(66-98) 132/72     Weight: 112 kg (246 lb 14.6 oz)  Body mass index is 36.46 kg/m².    Intake/Output Summary (Last 24 hours) at 3/26/2023 1510  Last data filed at 3/26/2023 0623  Gross per 24 hour   Intake 563.3 ml   Output 300 ml   Net 263.3 ml      Physical Exam  Constitutional:       General: He is not in acute distress.     Appearance: He is well-developed. He is obese.   HENT:      Head: Contusion (forehead) and laceration (right side of nose) present.   Eyes:      Pupils: Pupils are equal, round, and reactive to light.   Neck:      Comments: Cervical collar in place (hard)  Cardiovascular:      Rate and Rhythm: Normal rate and regular rhythm.      Pulses: Normal pulses.      Heart sounds: Murmur heard.   Pulmonary:      Effort: Pulmonary effort is normal. Tachypnea present.      Breath sounds: Rhonchi present.   Abdominal:      General: Bowel sounds are normal.      Palpations: Abdomen is soft.   Musculoskeletal:         General: Tenderness (right shoulder) present.      Right lower leg: Edema present.      Left lower leg: Edema present.      Comments: Decreased ROM R shoulder   Skin:     General: Skin is warm and dry.      Findings: Bruising and rash (chronic skin changes to BLE) present.      Comments: Diffuse ecchymosis face and neck   Neurological:      General: No focal deficit present.      Mental Status: He is alert.      Comments: Some increased weakness to left upper extremity postoperatively as compared to prior.  Some mild confusion, but seems to be easy to redirect.      Psychiatric:         Mood and Affect: Mood normal.         Behavior: Behavior normal.       Significant Labs: All pertinent labs within the past 24 hours have been reviewed.  CBC:   Recent Labs   Lab 03/24/23  1608 03/25/23  0456   WBC 7.69 9.95   HGB 9.4* 9.1*   HCT 27.6* 26.5*    220     CMP:   Recent Labs   Lab 03/24/23  1608 03/25/23  0456   * 135*   K 4.6 4.6    102   CO2 23 23   * 120*   BUN 25* 26*   CREATININE 1.3 1.2   CALCIUM 8.9 9.1   ANIONGAP 8 10     Magnesium:   Recent Labs   Lab 03/24/23  1608 03/25/23  0456   MG 2.2 2.0       Significant Imaging: I have reviewed all pertinent imaging results/findings within the past 24 hours.      Assessment/Plan:      * Spinal cord compression  POD 1 s/p 1. Partial C2 and T1 laminectomy, medial facetectomy   2. Total C3, C4, C5, C6, C7 laminectomy, medial facetectomy  3. Posterior instrumentation bilateral C3-T2 using stereotactic navigation  4. Posterolateral arthrodesis C3-4, C4-5, C5-6, C6-7, C7-T1, T1-T2  5. Harvesting local autograft  6. Implantation of cell based allograft  7. Intraoperative neuro monitoring with Dr. Kc  Continue to follow recommendations .  Follow hemoglobin and hematocrit closely.  Pain control   Physical therapy as per neurosurgery instruction with fall precautions.          Pneumonia  IV rocephin-completed 3 day course azithromycin  brochodilators prn  Supplemental oxygen as needed   Check procalcitonin-WNL  Continue IS post operatively      Multiple skin tears  Present on admission s/p fall  Wound care      Closed nondisplaced fracture of sixth cervical vertebra  MRI brain/cervical spine/lumbar spine-reviewed  Consult neurosurgery-procedure as described above.      Warfarin anticoagulation  On hold 2/2 surgery.  Resume when OK with surgeon/cardiology.    Chronic systolic congestive heart failure  Patient is identified as having Systolic (HFrEF) heart failure that is Chronic. CHF is currently controlled.  Latest ECHO performed and demonstrates- Results for orders placed during the hospital encounter of 10/22/21    Echo    Interpretation Summary  · The left ventricle is mildly enlarged with mild concentric hypertrophy and mildly decreased systolic function.  · The estimated ejection fraction is 40%.  · Grade I left ventricular diastolic dysfunction.  · There are segmental left ventricular wall motion abnormalities. There is mid-inferior dyskinesis  · Mild tricuspid regurgitation.  · Severe left atrial enlargement.  · Mild right ventricular enlargement with normal right ventricular systolic function.  . Continue Beta Blocker and monitor clinical status closely. Monitor on telemetry. Patient is off CHF pathway.  Monitor strict Is&Os and daily weights.  Continue to stress to patient importance of self efficacy and  on diet for CHF. Last BNP reviewed- and noted below No results for input(s): BNP, BNPTRIAGEBLO in the last 168 hours..      Anemia due to stage 3 chronic kidney disease  Patient's anemia is currently controlled. S/p 2 units of PRBCs.  Current CBC reviewed-   Lab Results   Component Value Date    HGB 9.1 (L) 03/25/2023    HCT 26.5 (L) 03/25/2023    MCV 95 03/25/2023     03/25/2023     Monitor serial CBC and transfuse if patient becomes hemodynamically unstable, symptomatic or H/H drops below 7/21.         Generalized weakness  Etiology unclear.  Check CPK-mildly elevated.  Fluids given overnight-trend; now WNL.     PT/OT consult  Maintain fall precautions  Consult neurology/neurosurgery        Stasis dermatitis of both legs  chronic      Paroxysmal atrial fibrillation  Patient with Long standing persistent (>12 months) atrial fibrillation which is controlled currently with Beta Blocker. Patient is currently in atrial fibrillation.[id number] Score: 4.  Anticoagulation indicated. Anticoagulation done with warfarin chronically on hold 2/2 surgery.  Per neurosurgery (Dr. Worrell covering for   De)-generally try to hold anticoagulation at least 5 days.  Will follow up with Dr. Kc.        CKD (chronic kidney disease) stage 3, GFR 30-59 ml/min, 41  Creatine stable for now. BMP reviewed- noted Estimated Creatinine Clearance: 65.6 mL/min (based on SCr of 1.2 mg/dL). according to latest data. Monitor UOP and serial BMP and adjust therapy as needed. Renally dose meds.          GERD (gastroesophageal reflux disease)  Chronic; continue home pepcid      CAD (coronary artery disease)  Cardiology consulted for cardiac clearance-stress test completed and cleared for surgery.      The ECG portion of the study is negative for ischemia.    There were no arrhythmias during stress.    The nuclear portion of this study will be reported separately.    1. No confluent evidence of pharmacologically induced reversible ischemia.  2. Normal left ventricular wall motion.  3. Calculated ejection fraction of 53%    Continue to monitor telemetry postoperatively           Hypertension associated with diabetes  Chronic, controlled.  Latest blood pressure and vitals reviewed-   Temp:  [96.6 °F (35.9 °C)-98.1 °F (36.7 °C)]   Pulse:  []   Resp:  [6-34]   BP: (120-199)/(72-99)   SpO2:  [94 %-100 %]   Arterial Line BP: (162-172)/(76-84) .   Home meds for hypertension were reviewed and noted below.   Hypertension Medications             carvediloL (COREG) 25 MG tablet Take 1 tablet (25 mg total) by mouth 2 (two) times daily with meals.    lisinopriL (PRINIVIL,ZESTRIL) 40 MG tablet Take 1 tablet (40 mg total) by mouth every evening.    nitroGLYCERIN 0.4 MG/DOSE TL SPRY (NITROLINGUAL) 400 mcg/spray spray PLACE 1 SPRAY UNDER THE TONGUE EVERY 5 MINUTES AS NEEDED FOR CHEST PAIN          While in the hospital, will manage blood pressure as follows; Adjust home antihypertensive regimen as follows- continue chronic beta blocker; will hold ACE for now due to recent EMMY.  Monitor creatinine and resume lisinopril when proven stable.-resume  nightly lisinopril and continue to monitor.    Will utilize p.r.n. blood pressure medication only if patient's blood pressure greater than  180/110 and he develops symptoms such as worsening chest pain or shortness of breath.      MTHFR mutation (methylenetetrahydrofolate reductase)  Consult hematology for recommendations regarding anticoagulation-daughter prefers to avoid lovenox-hematology states they do not manage-will defer to cardiology.  Placed on IV heparin drip-warfarin stopped  Further anticoagulation held for now 2/2 surgery  Will resume as recommended by surgeon/cardiology      Diabetes mellitus with chronic kidney disease, stage III  Not on chronic meds since bariatric surgery.  Monitor daily glucose-has been controlled.      VTE Risk Mitigation (From admission, onward)         Ordered     Place sequential compression device  Until discontinued         03/24/23 0621     Place sequential compression device  Until discontinued         03/20/23 1929     Reason for No Pharmacological VTE Prophylaxis  Once        Question:  Reasons:  Answer:  Already adequately anticoagulated on oral Anticoagulants    03/20/23 1929                Discharge Planning   ORACIO: 3/28/2023     Code Status: Prior   Is the patient medically ready for discharge?:     Reason for patient still in hospital (select all that apply): Patient trending condition, Laboratory test, Treatment, Consult recommendations, PT / OT recommendations and Pending disposition  Discharge Plan A: Skilled Nursing Facility                  RICK Nassar  Department of Hospital Medicine   [facility]

## 2023-03-26 NOTE — PT/OT/SLP PROGRESS
Physical Therapy Treatment    Patient Name:  Richard Bustos   MRN:  [id number]    Recommendations:     Discharge Recommendations: nursing facility, skilled  Discharge Equipment Recommendations: other (see comments) (TBD)  Barriers to discharge: None    Assessment:     Richard Bustos is a 77 y.o. male admitted with a medical diagnosis of Spinal cord compression.  He presents with the following impairments/functional limitations: weakness, impaired endurance, impaired self care skills, impaired functional mobility, impaired balance, decreased upper extremity function, decreased lower extremity function, pain, impaired skin, edema, orthopedic precautions . Supine in bed with Aspen collar in place.  Agreed to participate in therapy with report of posterior neck discomfort as well as LUE. Some confusion noted as patient stating that the floor is full of sand and its crooked also noted to fall asleep on occasion. Requires safety with mobility due to weakness, decreased balance and impaired cognition. C - collar adjusted prior to activity .  Transferred EOB with A x 2 with Max A for balance initially leaning R side and posterior. Assistance decreased to Min A with RUE support . Sat ~ 12 min . Unable to tolerate weight LUE and presents with minimal  strength. Difficulty keeping feet planted with any activity both would elevate.  Sit > supine with A x 2. AAROM exercises BLE's in supine .     Rehab Prognosis: Good; patient would benefit from acute skilled PT services to address these deficits and reach maximum level of function.    Recent Surgery: Procedure(s) (LRB):  FUSION, SPINE, CERVICAL (N/A) 2 Days Post-Op    Plan:     During this hospitalization, patient to be seen daily to address the identified rehab impairments via gait training, therapeutic activities, therapeutic exercises and progress toward the following goals:    Plan of Care Expires:  04/21/23    Subjective     Chief Complaint: pain in neck and  LUE  Patient/Family Comments/goals: none stated  Pain/Comfort:  Pain Rating 1: other (see comments) (did not rate)  Location - Orientation 1: posterior  Location 1: cervical spine (LUE as well)  Pain Addressed 1: Reposition, Nurse notified      Objective:     Communicated with nurse Snell prior to session.  Patient found supine with bed alarm, cervical collar, peripheral IV, telemetry ([facility] at bedside) upon PT entry to room.     General Precautions: Standard, fall  Orthopedic Precautions: spinal precautions  Braces: Aspen collar  Respiratory Status: Room air     Functional Mobility:  Bed Mobility:     Rolling Left:  maximal assistance and of 2 persons  Rolling Right: maximal assistance and of 2 persons  Supine to Sit: total assistance  Sit to Supine: total assistance      AM-PAC 6 CLICK MOBILITY          Treatment & Education:  Transferred EOB with A x 2 with extra time to achieve upright sitting Max A > Min A with RUE support.  Unable to attempt standing , due to weakness and difficulty keeping feet planted.  Sit > supine with A x 2.   BLE exercises: SLR's, HS, hip abd/add, AP's.  LUE and BLE 's elevated.      Patient left HOB elevated with all lines intact, call button in reach, bed alarm on, nurse Snell notified, and [facility] present..    GOALS:   Multidisciplinary Problems       Physical Therapy Goals          Problem: Physical Therapy    Goal Priority Disciplines Outcome Goal Variances Interventions   Physical Therapy Goal     PT, PT/OT Ongoing, Progressing     Description: Goals to be met by: 2023     Patient will increase functional independence with mobility by performin. Supine to sit with MInimal Assistance  2. Sit to stand transfer with Moderate Assistance  3. Bed to chair transfer with Maximum Assistance using Rolling Walker  4. Gait  x 10 feet with Maximum Assistance using Rolling Walker.   5. Sitting at edge of bed x20 minutes with Minimal Assistance  3. Lower extremity exercise program  x20 reps     3/25/23:  Patient reevaluated for PT this morning and all goals remain appropriate.                       Time Tracking:     PT Received On: 03/26/23  PT Start Time: 0815     PT Stop Time: 0838  PT Total Time (min): 23 min     Billable Minutes: Therapeutic activity 15min, therapeutic exercises 8 min     Treatment Type: Treatment  PT/PTA: PTA     Number of PTA visits since last PT visit: 1 03/26/2023

## 2023-03-26 NOTE — ASSESSMENT & PLAN NOTE
Patient's anemia is currently controlled. S/p 2 units of PRBCs.  Current CBC reviewed-   Lab Results   Component Value Date    HGB 9.1 (L) 03/25/2023    HCT 26.5 (L) 03/25/2023    MCV 95 03/25/2023     03/25/2023     Monitor serial CBC and transfuse if patient becomes hemodynamically unstable, symptomatic or H/H drops below 7/21.

## 2023-03-26 NOTE — ASSESSMENT & PLAN NOTE
Patient with Long standing persistent (>12 months) atrial fibrillation which is controlled currently with Beta Blocker. Patient is currently in atrial fibrillation.[id number] Score: 4.  Anticoagulation indicated. Anticoagulation done with warfarin chronically on hold 2/2 surgery.  Per neurosurgery (Dr. Worrell covering for Dr. Kc)-generally try to hold anticoagulation at least 5 days.  Will follow up with Dr. Kc.

## 2023-03-26 NOTE — PLAN OF CARE
Problem: Physical Therapy  Goal: Physical Therapy Goal  Description: Goals to be met by: 2023     Patient will increase functional independence with mobility by performin. Supine to sit with MInimal Assistance  2. Sit to stand transfer with Moderate Assistance  3. Bed to chair transfer with Maximum Assistance using Rolling Walker  4. Gait  x 10 feet with Maximum Assistance using Rolling Walker.   5. Sitting at edge of bed x20 minutes with Minimal Assistance  3. Lower extremity exercise program x20 reps     3/25/23:  Patient reevaluated for PT this morning and all goals remain appropriate.  Outcome: Ongoing, Progressing   Therapeutic activity to improve functional mobility : bed mobility , transfers, gait with rw and assistance, LE exercises.

## 2023-03-26 NOTE — SUBJECTIVE & OBJECTIVE
Interval History: no acute events overnight.  Remains somewhat disoriented; easily redirected.    Review of Systems   Constitutional:  Positive for fatigue. Negative for chills and fever.   HENT:  Negative for congestion.    Respiratory:  Negative for chest tightness and shortness of breath.    Cardiovascular:  Negative for chest pain and palpitations.   Gastrointestinal:  Negative for abdominal pain, diarrhea, nausea and vomiting.   Genitourinary:  Negative for dysuria and hematuria.   Musculoskeletal:  Positive for arthralgias, gait problem and myalgias.   Skin:  Positive for color change and wound.   Neurological:  Positive for weakness. Negative for headaches.   Hematological:  Bruises/bleeds easily.   Psychiatric/Behavioral:  Positive for confusion. Negative for agitation.    All other systems reviewed and are negative.  Objective:     Vital Signs (Most Recent):  Temp: 97.3 °F (36.3 °C) (03/26/23 1140)  Pulse: 66 (03/26/23 1140)  Resp: 20 (03/26/23 1140)  BP: 132/72 (03/26/23 1140)  SpO2: (!) 93 % (03/26/23 1140)   Vital Signs (24h Range):  Temp:  [97.3 °F (36.3 °C)-99 °F (37.2 °C)] 97.3 °F (36.3 °C)  Pulse:  [] 66  Resp:  [12-20] 20  SpO2:  [92 %-95 %] 93 %  BP: (125-177)/(66-98) 132/72     Weight: 112 kg (246 lb 14.6 oz)  Body mass index is 36.46 kg/m².    Intake/Output Summary (Last 24 hours) at 3/26/2023 1510  Last data filed at 3/26/2023 0623  Gross per 24 hour   Intake 563.3 ml   Output 300 ml   Net 263.3 ml      Physical Exam  Constitutional:       General: He is not in acute distress.     Appearance: He is well-developed. He is obese.   HENT:      Head: Contusion (forehead) and laceration (right side of nose) present.   Eyes:      Pupils: Pupils are equal, round, and reactive to light.   Neck:      Comments: Cervical collar in place (hard)  Cardiovascular:      Rate and Rhythm: Normal rate and regular rhythm.      Pulses: Normal pulses.      Heart sounds: Murmur heard.   Pulmonary:      Effort:  Pulmonary effort is normal. Tachypnea present.      Breath sounds: Rhonchi present.   Abdominal:      General: Bowel sounds are normal.      Palpations: Abdomen is soft.   Musculoskeletal:         General: Tenderness (right shoulder) present.      Right lower leg: Edema present.      Left lower leg: Edema present.      Comments: Decreased ROM R shoulder   Skin:     General: Skin is warm and dry.      Findings: Bruising and rash (chronic skin changes to BLE) present.      Comments: Diffuse ecchymosis face and neck   Neurological:      General: No focal deficit present.      Mental Status: He is alert.      Comments: Some increased weakness to left upper extremity postoperatively as compared to prior.  Some mild confusion, but seems to be easy to redirect.     Psychiatric:         Mood and Affect: Mood normal.         Behavior: Behavior normal.       Significant Labs: All pertinent labs within the past 24 hours have been reviewed.  CBC:   Recent Labs   Lab 03/24/23  1608 03/25/23  0456   WBC 7.69 9.95   HGB 9.4* 9.1*   HCT 27.6* 26.5*    220     CMP:   Recent Labs   Lab 03/24/23  1608 03/25/23  0456   * 135*   K 4.6 4.6    102   CO2 23 23   * 120*   BUN 25* 26*   CREATININE 1.3 1.2   CALCIUM 8.9 9.1   ANIONGAP 8 10     Magnesium:   Recent Labs   Lab 03/24/23  1608 03/25/23  0456   MG 2.2 2.0       Significant Imaging: I have reviewed all pertinent imaging results/findings within the past 24 hours.

## 2023-03-26 NOTE — PROGRESS NOTES
Neurosurgery  History & Physical    SUBJECTIVE:     Chief Complaint: Neck pain and arm weakness.    History of Present Illness:  Patient reports no issues overnight. He as worked with PT.  By report he did not ambulate yet.  Pt reports that his pain is under better control.  He reports that his strength is similar to yesterday.     Review of patient's allergies indicates:   Allergen Reactions    Donepezil Other (See Comments)     AMS    Bactroban [mupirocin calcium] Blisters     Causes Blisters    Shellfish containing products Other (See Comments)     Other reaction(s): Gout  OYSTERS       Current Facility-Administered Medications   Medication Dose Route Frequency Provider Last Rate Last Admin    0.9%  NaCl infusion (for blood administration)   Intravenous Q24H PRN RICK Zaidi        acetaminophen tablet 650 mg  650 mg Oral Q6H PRN Kike Kc DO        albuterol sulfate nebulizer solution 2.5 mg  2.5 mg Nebulization Q4H PRN Bill Gamino MD   2.5 mg at 03/26/23 0721    allopurinoL tablet 100 mg  100 mg Oral QHS ELEANOR ZaidiP   100 mg at 03/25/23 2116    aluminum-magnesium hydroxide-simethicone 200-200-20 mg/5 mL suspension 30 mL  30 mL Oral QID PRN RICK Zaidi        amiodarone tablet 200 mg  200 mg Oral BID Adeline Dewitt NP   200 mg at 03/26/23 1039    bisacodyL suppository 10 mg  10 mg Rectal Daily Kike Kc DO   10 mg at 03/26/23 1039    calcitRIOL capsule 0.75 mcg  0.75 mcg Oral Daily ELEANOR ZaidiP   0.75 mcg at 03/26/23 1039    carvediloL tablet 25 mg  25 mg Oral BID WM ELEANOR ZaidiP   25 mg at 03/26/23 1040    dextrose 10% bolus 125 mL 125 mL  12.5 g Intravenous PRN Fern Mix, ELEANORP        dextrose 10% bolus 250 mL 250 mL  25 g Intravenous PRN Fern Mix, ELEANORP        dextrose 40 % gel 15,000 mg  15 g Oral PRN Fern Mix, ELEANORP        dextrose 40 % gel 30,000 mg  30 g Oral PRN Fern Mix, ELEANORP         famotidine tablet 40 mg  40 mg Oral Daily ELEANOR ZaidiP   40 mg at 03/26/23 1041    ferrous sulfate tablet 1 each  1 tablet Oral BID RICK Zaidi   1 each at 03/26/23 1039    fluticasone propionate 50 mcg/actuation nasal spray 100 mcg  2 spray Each Nostril Daily PRN RICK Zaidi        glucagon (human recombinant) injection 1 mg  1 mg Intramuscular PRN Fern Mix, RICK        HYDROcodone-acetaminophen 5-325 mg per tablet 1 tablet  1 tablet Oral Q6H PRN RICK Zaidi   1 tablet at 03/25/23 0051    HYDROmorphone (PF) injection 2 mg  2 mg Intravenous Q3H PRN Kike Kc, DO        ipratropium 0.02 % nebulizer solution 0.5 mg  0.5 mg Nebulization Q4H PRN Bill Gamino MD   0.5 mg at 03/26/23 0721    lisinopriL tablet 40 mg  40 mg Oral QHS ELEANOR ZaidiP   40 mg at 03/25/23 2128    melatonin tablet 6 mg  6 mg Oral Nightly PRN RICK Zaidi   6 mg at 03/25/23 2116    methocarbamoL (ROBAXIN) 1,000 mg in dextrose 5 % (D5W) 100 mL IVPB  1,000 mg Intravenous Q8H Kike Kc, DO   Stopped at 03/26/23 0556    mupirocin 2 % ointment   Nasal BID Kike Kc, DO   Given at 03/26/23 1038    naloxone 0.4 mg/mL injection 0.02 mg  0.02 mg Intravenous PRN RICK Zaidi        ondansetron disintegrating tablet 8 mg  8 mg Oral Q6H PRN Kike Kc, DO        ondansetron injection 4 mg  4 mg Intravenous Q8H PRN Fern Mix, RICK        oxyCODONE immediate release tablet Tab 10 mg  10 mg Oral Q3H PRN Kike Kc, DO        oxyCODONE-acetaminophen  mg per tablet 1 tablet  1 tablet Oral Q4H PRN Kike Kc, DO        prochlorperazine injection Soln 5 mg  5 mg Intravenous Q6H PRN Kike Kc,         senna-docusate 8.6-50 mg per tablet 1 tablet  1 tablet Oral BID Fern Mix, ELEANOR   1 tablet at 03/26/23 1040    senna-docusate 8.6-50 mg per tablet 2 tablet  2 tablet Oral Nightly PRN Kike COKER  DO De        simethicone chewable tablet 80 mg  1 tablet Oral QID PRN Fern Huangi, FNP        sodium chloride 0.9% flush 10 mL  10 mL Intravenous Q12H PRN Fern Huangi, FNP        sodium chloride 0.9% flush 10 mL  10 mL Intravenous Q6H Fernana Huangi, FNP   10 mL at 03/26/23 0526    And    sodium chloride 0.9% flush 10 mL  10 mL Intravenous PRN Fern Huangi, FNP        traZODone tablet 50 mg  50 mg Oral QHS Fern NIHARIKA Mooreski, FNP   50 mg at 03/25/23 2116     Facility-Administered Medications Ordered in Other Encounters   Medication Dose Route Frequency Provider Last Rate Last Admin    lactated ringers infusion   Intravenous Once PRN Evangelist Bose MD           Past Medical History:   Diagnosis Date    Anemia     Anemia of other chronic disease 09/13/2017    Anemia, chronic renal failure 09/13/2017    Anemia, unspecified 09/13/2017    Anticoagulant long-term use     Aorta aneurysm     Arthritis     Atrial fibrillation     Bacteremia due to Streptococcus 01/08/2022    CAD (coronary artery disease)     CHF (congestive heart failure)     Chronic kidney disease     Clotting disorder 09/13/2017    Colon polyp     Dementia     Diabetes mellitus     not on meds    Diabetes mellitus type II     Diverticulosis     Elevated PSA     Encounter for blood transfusion     Former smoker     General anesthetics causing adverse effect in therapeutic use     TROUBLE COMING OUT OF ANESTHESIA WITH GASTRIC BYPASS    GERD (gastroesophageal reflux disease)     Gout     Hx of colonic polyps     Hypercoagulable state     Hyperlipidemia     Hypertension     MI, old 2010    MTHFR mutation     Myocardial infarction     9/2010    Osteomyelitis of ankle or foot 03/09/2017    Prostate cancer 06/2016    Sleep apnea     Squamous cell carcinoma 01/23/2018    Left helix, imiquimod    Venous stasis     Chronic     Past Surgical History:   Procedure Laterality Date    ABDOMINAL SURGERY      CARDIAC SURGERY      3  STENTS     CAUDAL EPIDURAL STEROID INJECTION N/A 6/22/2020    Procedure: INJECTION, STEROID, SPINE, EPIDURAL, CAUDAL;  Surgeon: Evangelist Bose MD;  Location: [facility] OR;  Service: Pain Management;  Laterality: N/A;    COLONOSCOPY  02/2011    diverticulosis with diverticula with clot, no active bleeding    COLONOSCOPY N/A 8/24/2016    Procedure: COLONOSCOPY;  Surgeon: Saroj Borjas MD;  Location: [facility] ENDO;  Service: Endoscopy;  Laterality: N/A;    COLONOSCOPY N/A 11/18/2020    Procedure: COLONOSCOPY;  Surgeon: Saroj Borjas MD;  Location: [facility] ENDO;  Service: Endoscopy;  Laterality: N/A;    COLONOSCOPY N/A 10/27/2021    Procedure: COLONOSCOPY;  Surgeon: Saroj Borjas MD;  Location: [facility] ENDO;  Service: Endoscopy;  Laterality: N/A;    EPIDURAL STEROID INJECTION INTO LUMBAR SPINE N/A 6/22/2020    Procedure: Injection-steroid-epidural-lumbar;  Surgeon: Evangelist Bose MD;  Location: [facility] OR;  Service: Pain Management;  Laterality: N/A;  L3 L4 L5 S1    EPIDURAL STEROID INJECTION INTO LUMBAR SPINE N/A 9/21/2020    Procedure: Injection-steroid-epidural-lumbar;  Surgeon: Evangelist Bose MD;  Location: [facility] OR;  Service: Pain Management;  Laterality: N/A;  L5-S1    GASTRIC BYPASS  2/5/2008    IVC FILTER RETRIEVAL      JOINT REPLACEMENT  1996 and 2001    bi-lat hip replacement/Rt Hip and Lt Hip    RADIOFREQUENCY ABLATION OF LUMBAR MEDIAL BRANCH NERVE AT SINGLE LEVEL Bilateral 1/10/2020    Procedure: Radiofrequency Ablation, Nerve, Spinal, Lumbar, Medial Branch, 1 Level;  Surgeon: Evangelist Bose MD;  Location: [facility] OR;  Service: Pain Management;  Laterality: Bilateral;  L3,L4,L5    RADIOFREQUENCY ABLATION OF LUMBAR MEDIAL BRANCH NERVE AT SINGLE LEVEL Bilateral 11/23/2021    Procedure: Radiofrequency Ablation, Nerve, Spinal, Lumbar, Medial Branch, Bilateral L 3,4,5;  Surgeon: Nile Causey MD;  Location: [facility] OR;  Service: Pain Management;  Laterality: Bilateral;    ROTATOR CUFF REPAIR      Rt shoulder    Stents   "8/18/2010    x 3    UPPER GASTROINTESTINAL ENDOSCOPY  02/2011     Family History       Problem Relation (Age of Onset)    Heart attack Mother, Father, Brother    Lupus Daughter    Ulcerative colitis Daughter (35)          Social History     Socioeconomic History    Marital status:    Tobacco Use    Smoking status: Former    Smokeless tobacco: Never    Tobacco comments:     47 yrs ago, only smoked 3-4 packs in his entire life as a teenager   Substance and Sexual Activity    Alcohol use: Not Currently     Comment: rare    Drug use: No    Sexual activity: Not Currently       Review of Systems   Constitutional: Negative.    Neurological:  Positive for weakness. Negative for dizziness, tremors, seizures, syncope, facial asymmetry, speech difficulty, light-headedness, numbness and headaches.     OBJECTIVE:     Vital Signs  Temp: 97.3 °F (36.3 °C)  Pulse: 66  Resp: 20  BP: 132/72  SpO2: (!) 93 %  Height: 5' 9" (175.3 cm)  Weight: 112 kg (246 lb 14.6 oz)  Body mass index is 36.46 kg/m².      Physical Exam:    Constitutional: He appears well-developed and well-nourished.     Eyes: EOM are normal.     Abdominal: Soft.     Musculoskeletal:        Right Upper Extremities: Muscle strength is 3/5.        Left Upper Extremities: Muscle strength is 2/5.       Right Lower Extremities: Muscle strength is 4/5.        Left Lower Extremities: Muscle strength is 4/5.     Neurological:        Sensory: There is no sensory deficit in the trunk. There is no sensory deficit in the extremities.       Diagnostic Results:    No new imaging.    ASSESSMENT/PLAN:     Pt stable.  He will need to continue to work with PT.   Continue collar.  Pt will likely need rehab.    Note dictated with voice recognition software, please excuse any grammatical errors.   "

## 2023-03-26 NOTE — PLAN OF CARE
Pt appears to be resting, eyes are closed, breaths are deep and even. VSS, NAD noted at this time. Discussed PoC with pt, stated they understood and would help as able, but pt has moments of confusion and hallucinations, not so easily redirectable at times. No c/o pain, will continue to monitor.

## 2023-03-27 ENCOUNTER — TELEPHONE (OUTPATIENT)
Dept: NEUROSURGERY | Facility: CLINIC | Age: 78
End: 2023-03-27
Payer: MEDICARE

## 2023-03-27 DIAGNOSIS — M54.2 NECK PAIN: Primary | ICD-10-CM

## 2023-03-27 LAB
ALBUMIN SERPL BCP-MCNC: 2.2 G/DL (ref 3.5–5.2)
ALP SERPL-CCNC: 78 U/L (ref 55–135)
ALT SERPL W/O P-5'-P-CCNC: 10 U/L (ref 10–44)
ANION GAP SERPL CALC-SCNC: 9 MMOL/L (ref 8–16)
AST SERPL-CCNC: 35 U/L (ref 10–40)
BASOPHILS # BLD AUTO: 0.02 K/UL (ref 0–0.2)
BASOPHILS NFR BLD: 0.2 % (ref 0–1.9)
BILIRUB SERPL-MCNC: 0.7 MG/DL (ref 0.1–1)
BLD PROD TYP BPU: NORMAL
BLOOD UNIT EXPIRATION DATE: NORMAL
BLOOD UNIT TYPE CODE: 600
BLOOD UNIT TYPE CODE: 6200
BLOOD UNIT TYPE: NORMAL
BUN SERPL-MCNC: 30 MG/DL (ref 8–23)
CALCIUM SERPL-MCNC: 9.2 MG/DL (ref 8.7–10.5)
CHLORIDE SERPL-SCNC: 103 MMOL/L (ref 95–110)
CO2 SERPL-SCNC: 22 MMOL/L (ref 23–29)
CODING SYSTEM: NORMAL
CREAT SERPL-MCNC: 1.4 MG/DL (ref 0.5–1.4)
CROSSMATCH INTERPRETATION: NORMAL
DIFFERENTIAL METHOD: ABNORMAL
DISPENSE STATUS: NORMAL
EOSINOPHIL # BLD AUTO: 0.1 K/UL (ref 0–0.5)
EOSINOPHIL NFR BLD: 0.6 % (ref 0–8)
ERYTHROCYTE [DISTWIDTH] IN BLOOD BY AUTOMATED COUNT: 15.9 % (ref 11.5–14.5)
EST. GFR  (NO RACE VARIABLE): 52 ML/MIN/1.73 M^2
GLUCOSE SERPL-MCNC: 89 MG/DL (ref 70–110)
HCT VFR BLD AUTO: 25.1 % (ref 40–54)
HGB BLD-MCNC: 8.5 G/DL (ref 14–18)
IMM GRANULOCYTES # BLD AUTO: 0.09 K/UL (ref 0–0.04)
IMM GRANULOCYTES NFR BLD AUTO: 1 % (ref 0–0.5)
LYMPHOCYTES # BLD AUTO: 1 K/UL (ref 1–4.8)
LYMPHOCYTES NFR BLD: 11.5 % (ref 18–48)
MAGNESIUM SERPL-MCNC: 1.9 MG/DL (ref 1.6–2.6)
MCH RBC QN AUTO: 33.2 PG (ref 27–31)
MCHC RBC AUTO-ENTMCNC: 33.9 G/DL (ref 32–36)
MCV RBC AUTO: 98 FL (ref 82–98)
MONOCYTES # BLD AUTO: 0.7 K/UL (ref 0.3–1)
MONOCYTES NFR BLD: 7.6 % (ref 4–15)
NEUTROPHILS # BLD AUTO: 6.9 K/UL (ref 1.8–7.7)
NEUTROPHILS NFR BLD: 79.1 % (ref 38–73)
NRBC BLD-RTO: 0 /100 WBC
NUM UNITS TRANS PACKED RBC: NORMAL
NUM UNITS TRANS PACKED RBC: NORMAL
PLATELET # BLD AUTO: 185 K/UL (ref 150–450)
PMV BLD AUTO: 9.9 FL (ref 9.2–12.9)
POTASSIUM SERPL-SCNC: 4 MMOL/L (ref 3.5–5.1)
PROT SERPL-MCNC: 5.1 G/DL (ref 6–8.4)
RBC # BLD AUTO: 2.56 M/UL (ref 4.6–6.2)
SODIUM SERPL-SCNC: 134 MMOL/L (ref 136–145)
UNIT NUMBER: NORMAL
UNIT NUMBER: NORMAL
WBC # BLD AUTO: 8.69 K/UL (ref 3.9–12.7)

## 2023-03-27 PROCEDURE — 85025 COMPLETE CBC W/AUTO DIFF WBC: CPT | Performed by: NURSE PRACTITIONER

## 2023-03-27 PROCEDURE — 25000003 PHARM REV CODE 250: Performed by: NURSE PRACTITIONER

## 2023-03-27 PROCEDURE — 83735 ASSAY OF MAGNESIUM: CPT | Performed by: NURSE PRACTITIONER

## 2023-03-27 PROCEDURE — 94799 UNLISTED PULMONARY SVC/PX: CPT

## 2023-03-27 PROCEDURE — 97112 NEUROMUSCULAR REEDUCATION: CPT | Mod: CO

## 2023-03-27 PROCEDURE — 80053 COMPREHEN METABOLIC PANEL: CPT | Performed by: NURSE PRACTITIONER

## 2023-03-27 PROCEDURE — 99223 1ST HOSP IP/OBS HIGH 75: CPT | Mod: ,,, | Performed by: INTERNAL MEDICINE

## 2023-03-27 PROCEDURE — 97530 THERAPEUTIC ACTIVITIES: CPT | Mod: CQ

## 2023-03-27 PROCEDURE — 63600175 PHARM REV CODE 636 W HCPCS: Performed by: NEUROLOGICAL SURGERY

## 2023-03-27 PROCEDURE — 99900035 HC TECH TIME PER 15 MIN (STAT)

## 2023-03-27 PROCEDURE — 94761 N-INVAS EAR/PLS OXIMETRY MLT: CPT

## 2023-03-27 PROCEDURE — 36415 COLL VENOUS BLD VENIPUNCTURE: CPT | Performed by: NURSE PRACTITIONER

## 2023-03-27 PROCEDURE — 12000002 HC ACUTE/MED SURGE SEMI-PRIVATE ROOM

## 2023-03-27 PROCEDURE — A4216 STERILE WATER/SALINE, 10 ML: HCPCS | Performed by: NURSE PRACTITIONER

## 2023-03-27 PROCEDURE — 25000003 PHARM REV CODE 250: Performed by: NEUROLOGICAL SURGERY

## 2023-03-27 PROCEDURE — 63600175 PHARM REV CODE 636 W HCPCS: Performed by: NURSE PRACTITIONER

## 2023-03-27 PROCEDURE — 99223 PR INITIAL HOSPITAL CARE,LEVL III: ICD-10-PCS | Mod: ,,, | Performed by: INTERNAL MEDICINE

## 2023-03-27 RX ORDER — WARFARIN 2.5 MG/1
2.5 TABLET ORAL DAILY
Status: DISCONTINUED | OUTPATIENT
Start: 2023-03-27 | End: 2023-03-28

## 2023-03-27 RX ORDER — LANOLIN ALCOHOL/MO/W.PET/CERES
400 CREAM (GRAM) TOPICAL NIGHTLY
Status: DISCONTINUED | OUTPATIENT
Start: 2023-03-27 | End: 2023-03-28 | Stop reason: HOSPADM

## 2023-03-27 RX ORDER — MAGNESIUM SULFATE 1 G/100ML
1 INJECTION INTRAVENOUS ONCE
Status: COMPLETED | OUTPATIENT
Start: 2023-03-27 | End: 2023-03-27

## 2023-03-27 RX ADMIN — MUPIROCIN: 20 OINTMENT TOPICAL at 08:03

## 2023-03-27 RX ADMIN — Medication 400 MG: at 08:03

## 2023-03-27 RX ADMIN — FAMOTIDINE 40 MG: 20 TABLET ORAL at 08:03

## 2023-03-27 RX ADMIN — TRAZODONE HYDROCHLORIDE 50 MG: 50 TABLET ORAL at 08:03

## 2023-03-27 RX ADMIN — CARVEDILOL 25 MG: 25 TABLET, FILM COATED ORAL at 08:03

## 2023-03-27 RX ADMIN — MAGNESIUM SULFATE 1 G: 1 INJECTION INTRAVENOUS at 02:03

## 2023-03-27 RX ADMIN — BISACODYL 10 MG: 10 SUPPOSITORY RECTAL at 08:03

## 2023-03-27 RX ADMIN — FERROUS SULFATE TAB 325 MG (65 MG ELEMENTAL FE) 1 EACH: 325 (65 FE) TAB at 08:03

## 2023-03-27 RX ADMIN — CALCITRIOL CAPSULES 0.25 MCG 0.75 MCG: 0.25 CAPSULE ORAL at 08:03

## 2023-03-27 RX ADMIN — METHOCARBAMOL 1000 MG: 100 INJECTION, SOLUTION INTRAMUSCULAR; INTRAVENOUS at 05:03

## 2023-03-27 RX ADMIN — AMIODARONE HYDROCHLORIDE 200 MG: 200 TABLET ORAL at 08:03

## 2023-03-27 RX ADMIN — DOCUSATE SODIUM AND SENNOSIDES 1 TABLET: 8.6; 5 TABLET, FILM COATED ORAL at 08:03

## 2023-03-27 RX ADMIN — SODIUM CHLORIDE, PRESERVATIVE FREE 10 ML: 5 INJECTION INTRAVENOUS at 12:03

## 2023-03-27 RX ADMIN — LISINOPRIL 40 MG: 40 TABLET ORAL at 08:03

## 2023-03-27 RX ADMIN — MUPIROCIN: 20 OINTMENT TOPICAL at 09:03

## 2023-03-27 RX ADMIN — ALLOPURINOL 100 MG: 100 TABLET ORAL at 08:03

## 2023-03-27 RX ADMIN — SODIUM CHLORIDE, PRESERVATIVE FREE 10 ML: 5 INJECTION INTRAVENOUS at 05:03

## 2023-03-27 NOTE — PLAN OF CARE
Received [facility] auth from [facility]eth with [facility]. Auth # [id number] Zahra. Awaiting medical clearance from Dr. Kc        03/27/23 1423   Post-Acute Status   Post-Acute Authorization Placement   Post-Acute Placement Status Set-up Complete/Auth obtained

## 2023-03-27 NOTE — ASSESSMENT & PLAN NOTE
Patient with Long standing persistent (>12 months) atrial fibrillation which is controlled currently with Beta Blocker. Patient is currently in atrial fibrillation.[id number] Score: 4.  Anticoagulation indicated. Anticoagulation done with warfarin chronically on hold 2/2 surgery.  Per neurosurgery (Dr. Worrell covering for Dr. Kc)-generally try to hold anticoagulation at least 5 days.  Will follow up with Neurosurgery.

## 2023-03-27 NOTE — CONSULTS
[facility]  Department of Cardiology  Consult Note      PATIENT NAME: Richard Bustos    MRN: [id number]  TODAY'S DATE: 03/27/2023  ADMIT DATE: 3/20/2023                          CONSULT REQUESTED BY: Bill Gamino MD    SUBJECTIVE     PRINCIPAL PROBLEM: Spinal cord compression  Richard Bustos is a 78 y/o M with a past medical history significant for anemia, CAD, CHF, CKD, atrial fibrillation on warfarin, DM2, HLD, HTN, and GERD who presented to the ED for further evaluation of BLE weakness which began following a fall 3 days ago.  Pt reports that he was walking down a ramp at home when he slipped due to the ramp being wet.  He fell to his knees, but continued the fall onto his face and head and had positive loss of consciousness.  He was initially seen at [facility] ED where he was transferred to [facility].  He reports an extensive workup with no fractures seen and was ultimately discharged home.  He reports difficulty bearing weight since the fall and feels substantially weaker than his baseline.  He reports that he normally uses a cane for ambulation, but has not been able to ambulate at all since his fall 3 days ago.  Today in the ED, CT head is significant for frontal scalp hematoma with no acute intracranial injury seen; bilateral nasal bone fractures are noted.  CXR is concerning for pneumonia.  He will be placed in observation under the service of hospital medicine for continued monitoring and treatment.        Overview/Hospital Course:  Richard Bustos was monitored closely during his hospitalization.  He was placed on IV rocephin and IV azithromycin in treatment of pneumonia.  An xray of his right shoulder was performed due to continued c/o pain and decreased ROM following his fall with no fracture or dislocation seen.  PT/OT were consulted.  An MRI of the brain was obtained with no acute findings.  MRI cervical spine concerning for C6 fractures, neural foraminal and spinal canal stenosis, severe cansl  stenosis cord compression C5-6 and mild to moderate cord flattening at C3-4, and C4-5, and other findings compatible with possible trauma as correlates with given history.  MRI lumbar spine showed multilevel degenerative changes, spinal canal stenosis and neural foraminal stenosis.  It was the recommendation of the neurosurgeon that pt undergo posterior cervical decompression/fusion C3-T1 with lumbar surgery to follow at a later date.  Due to the complexity of his health history, cardiology and hematology were consulted.  ASA and plavix were held. Coumadin was held and he was placed on a heparin drip in anticipation of surgery.  Cardiology recommended nuclear med stress test.  He was transferred to [facility] for come and go procedure-NM stress test with no reversible ischemia noted, so he received cardiac clearance for surgery.  On 3/24/23, Dr. Kc performed laminectomy, medial facetectomy of C2-T1.  Postoperatively, he continued to work with PT/OT.  Hard cervical collar was kept in place.  Pain was controlled with IV and oral narcotics.  He experienced some bouts of confusion, but was easily reoriented.     REASON FOR CONSULT:  Atrial fibrillation      HPI:  Patient is a 76-year-old male with known history of chronic congestive heart failure and with with reduced ejection fraction, known CAD chronic kidney disease has history of atrial fibrillation on warfarin he also has hypertension hyperlipidemia and diabetes.  Slipped and fell and had facial and head injury with loss of consciousness.  CT of the head is shown scalp hematoma with no acute intracranial injury bilateral nasal bone fractures and chest x-ray concerning for pneumonia.  Currently on antibiotics.  Underwent neurosurgery status post partial C2-T1 laminectomy and medial fasciotomy and total C3-C4 C5-C6 C7 laminectomy and medial fasciotomy.  And extensive neurosurgery.  Patient went into atrial fibrillation postoperatively.  Currently he is back in sinus  rhythm.  Patient denies any chest pain or tightness or heaviness his breathing is good denies any shortness of breath or difficulty breathing denies any dizziness or lightheadedness or loss of consciousness.        Review of patient's allergies indicates:   Allergen Reactions    Donepezil Other (See Comments)     AMS    Bactroban [mupirocin calcium] Blisters     Causes Blisters    Shellfish containing products Other (See Comments)     Other reaction(s): Gout  OYSTERS       Past Medical History:   Diagnosis Date    Anemia     Anemia of other chronic disease 09/13/2017    Anemia, chronic renal failure 09/13/2017    Anemia, unspecified 09/13/2017    Anticoagulant long-term use     Aorta aneurysm     Arthritis     Atrial fibrillation     Bacteremia due to Streptococcus 01/08/2022    CAD (coronary artery disease)     CHF (congestive heart failure)     Chronic kidney disease     Clotting disorder 09/13/2017    Colon polyp     Dementia     Diabetes mellitus     not on meds    Diabetes mellitus type II     Diverticulosis     Elevated PSA     Encounter for blood transfusion     Former smoker     General anesthetics causing adverse effect in therapeutic use     TROUBLE COMING OUT OF ANESTHESIA WITH GASTRIC BYPASS    GERD (gastroesophageal reflux disease)     Gout     Hx of colonic polyps     Hypercoagulable state     Hyperlipidemia     Hypertension     MI, old 2010    MTHFR mutation     Myocardial infarction     9/2010    Osteomyelitis of ankle or foot 03/09/2017    Prostate cancer 06/2016    Sleep apnea     Squamous cell carcinoma 01/23/2018    Left helix, imiquimod    Venous stasis     Chronic     Past Surgical History:   Procedure Laterality Date    ABDOMINAL SURGERY      CARDIAC SURGERY      3 STENTS     CAUDAL EPIDURAL STEROID INJECTION N/A 6/22/2020    Procedure: INJECTION, STEROID, SPINE, EPIDURAL, CAUDAL;  Surgeon: Evangelist Bose MD;  Location: [facility] OR;  Service: Pain Management;  Laterality: N/A;    COLONOSCOPY   02/2011    diverticulosis with diverticula with clot, no active bleeding    COLONOSCOPY N/A 8/24/2016    Procedure: COLONOSCOPY;  Surgeon: Saroj Borjas MD;  Location: [facility] ENDO;  Service: Endoscopy;  Laterality: N/A;    COLONOSCOPY N/A 11/18/2020    Procedure: COLONOSCOPY;  Surgeon: Saroj Borjas MD;  Location: [facility] ENDO;  Service: Endoscopy;  Laterality: N/A;    COLONOSCOPY N/A 10/27/2021    Procedure: COLONOSCOPY;  Surgeon: Saroj Borjas MD;  Location: [facility] ENDO;  Service: Endoscopy;  Laterality: N/A;    EPIDURAL STEROID INJECTION INTO LUMBAR SPINE N/A 6/22/2020    Procedure: Injection-steroid-epidural-lumbar;  Surgeon: Evangelist Bose MD;  Location: [facility] OR;  Service: Pain Management;  Laterality: N/A;  L3 L4 L5 S1    EPIDURAL STEROID INJECTION INTO LUMBAR SPINE N/A 9/21/2020    Procedure: Injection-steroid-epidural-lumbar;  Surgeon: Evangelist Bose MD;  Location: [facility] OR;  Service: Pain Management;  Laterality: N/A;  L5-S1    GASTRIC BYPASS  2/5/2008    IVC FILTER RETRIEVAL      JOINT REPLACEMENT  1996 and 2001    bi-lat hip replacement/Rt Hip and Lt Hip    RADIOFREQUENCY ABLATION OF LUMBAR MEDIAL BRANCH NERVE AT SINGLE LEVEL Bilateral 1/10/2020    Procedure: Radiofrequency Ablation, Nerve, Spinal, Lumbar, Medial Branch, 1 Level;  Surgeon: Evangelist Bose MD;  Location: [facility] OR;  Service: Pain Management;  Laterality: Bilateral;  L3,L4,L5    RADIOFREQUENCY ABLATION OF LUMBAR MEDIAL BRANCH NERVE AT SINGLE LEVEL Bilateral 11/23/2021    Procedure: Radiofrequency Ablation, Nerve, Spinal, Lumbar, Medial Branch, Bilateral L 3,4,5;  Surgeon: Nile Causey MD;  Location: [facility] OR;  Service: Pain Management;  Laterality: Bilateral;    ROTATOR CUFF REPAIR      Rt shoulder    Stents  8/18/2010    x 3    UPPER GASTROINTESTINAL ENDOSCOPY  02/2011     Social History     Tobacco Use    Smoking status: Former    Smokeless tobacco: Never    Tobacco comments:     47 yrs ago, only smoked 3-4 packs in his entire life  as a teenager   Substance Use Topics    Alcohol use: Not Currently     Comment: rare    Drug use: No        REVIEW OF SYSTEMS  CONSTITUTIONAL: Negative for chills, fatigue and fever.   EYES: No double vision, No blurred vision  NEURO:  Had an extensive neurosurgery.  RESPIRATORY: Negative for cough, shortness of breath and wheezing.    CARDIOVASCULAR: Negative for chest pain. Negative for palpitations and leg swelling.   GI: Negative for abdominal pain, No melena, diarrhea, nausea and vomiting.   : Negative for dysuria and frequency, Negative for hematuria  SKIN: Negative for bruising, Negative for edema or discoloration noted.   ENDOCRINE: Negative for polyphagia, Negative for heat intolerance, Negative for cold intolerance  PSYCHIATRIC: Negative for depression, Negative for anxiety, Negative for memory loss  MUSCULOSKELETAL:  Significant neck pain.  OBJECTIVE     VITAL SIGNS (Most Recent)  Temp: 97.7 °F (36.5 °C) (03/27/23 0756)  Pulse: 64 (03/27/23 0756)  Resp: 16 (03/27/23 0756)  BP: 130/64 (03/27/23 0756)  SpO2: (!) 94 % (03/27/23 0756)    VENTILATION STATUS  Resp: 16 (03/27/23 0756)  SpO2: (!) 94 % (03/27/23 0756)       I & O (Last 24H):  Intake/Output Summary (Last 24 hours) at 3/27/2023 0943  Last data filed at 3/27/2023 0423  Gross per 24 hour   Intake 450 ml   Output 100 ml   Net 350 ml       WEIGHTS  Wt Readings from Last 1 Encounters:   03/26/23 0327 112 kg (246 lb 14.6 oz)   03/24/23 0647 111.1 kg (245 lb)   03/22/23 1336 111.1 kg (245 lb)   03/20/23 2013 111.2 kg (245 lb 2.4 oz)   03/20/23 1927 111.4 kg (245 lb 9.5 oz)   03/20/23 1342 113.4 kg (250 lb)       PHYSICAL EXAM  GENERAL: well built, well nourished, well-developed in no apparent distress alert and oriented.   HEENT: Normocephalic.  With evidence of ecchymosis.    NECK: No JVD. No bruit..   THYROID: Thyroid not evaluated..   CARDIAC: Regular rate and rhythm. S1 is normal.S2 systolic murmur audible LUNGS: Clear to auscultation. No wheezing or  rhonchi.  No rales.   ABDOMEN: Soft.   URINARY:  No hematuria  EXTREMITIES: No cyanosis, clubbing or edema noted at this time., no calf tenderness bilaterally.   PERIPHERAL VASCULAR SYSTEM: palpable distal pulses.   CENTRAL NERVOUS SYSTEM:  As per neurosurgery   SKIN: Skin without lesions, moist, well perfused.   MUSCLE STRENGTH & TONE:  As per neurosurgery    HOME MEDICATIONS:  Current Facility-Administered Medications on File Prior to Encounter   Medication Dose Route Frequency Provider Last Rate Last Admin    lactated ringers infusion   Intravenous Once PRN Evangelist Bose MD         Current Outpatient Medications on File Prior to Encounter   Medication Sig Dispense Refill    aspirin (ECOTRIN) 81 MG EC tablet Take 81 mg by mouth once daily.      carvediloL (COREG) 25 MG tablet Take 1 tablet (25 mg total) by mouth 2 (two) times daily with meals. 180 tablet 3    allopurinoL (ZYLOPRIM) 100 MG tablet Take 1 tablet (100 mg total) by mouth every evening. 90 tablet 3    atorvastatin (LIPITOR) 80 MG tablet Take 1 tablet (80 mg total) by mouth once daily. 90 tablet 3    calcitRIOL (ROCALTROL) 0.5 MCG Cap 0.75 mcg once daily.   4    clopidogreL (PLAVIX) 75 mg tablet Take 1 tablet (75 mg total) by mouth once daily. 90 tablet 3    famotidine (PEPCID) 40 MG tablet Take 1 tablet (40 mg total) by mouth once daily. 90 tablet 3    ferrous sulfate (FEROSUL) 325 mg (65 mg iron) Tab tablet TAKE 1 TABLET BY MOUTH TWICE DAILY 180 tablet 3    fluticasone propionate (FLONASE) 50 mcg/actuation nasal spray SHAKE LIQUID AND USE 2 SPRAYS(100 MCG) IN EACH NOSTRIL EVERY DAY 16 g 5    FOLIC ACID/MULTIVIT-MIN/LUTEIN (CENTRUM SILVER ORAL) Take 1 tablet by mouth once daily.      HYDROcodone-acetaminophen (NORCO) 5-325 mg per tablet Take 1 tablet by mouth every 8 (eight) hours as needed for Pain. 90 tablet 0    LIDOcaine (LIDODERM) 5 % Place 1 patch onto the skin once daily. Remove & Discard patch within 12 hours or as directed by MD 15 patch 0     lisinopriL (PRINIVIL,ZESTRIL) 40 MG tablet Take 1 tablet (40 mg total) by mouth every evening. 90 tablet 3    magnesium oxide (MAG-OX) 400 mg (241.3 mg magnesium) tablet Take 1 tablet (400 mg total) by mouth once daily. 90 tablet 3    mecobal-levomefolat Ca-B6 phos (L-METHYL-B6-B12) 3-35-2 mg Tab Take 1 tablet by mouth 2 (two) times daily. 180 tablet 3    mirabegron (MYRBETRIQ) 50 mg Tb24 Take 1 tablet (50 mg total) by mouth once daily. 90 tablet 3    nitroGLYCERIN 0.4 MG/DOSE TL SPRY (NITROLINGUAL) 400 mcg/spray spray PLACE 1 SPRAY UNDER THE TONGUE EVERY 5 MINUTES AS NEEDED FOR CHEST PAIN 4.9 g 9    nystatin (MYCOSTATIN) powder Apply topically 2 (two) times daily. 60 g 11    omeprazole (PRILOSEC) 20 MG capsule Take 1 capsule (20 mg total) by mouth once daily. 90 capsule 3    prothrombin time/INR test metr Misc 1 Stick by Misc.(Non-Drug; Combo Route) route every 30 days. 1 each 0    traZODone (DESYREL) 50 MG tablet Take 1 tablet (50 mg total) by mouth every evening. 90 tablet 3    vit A/vit C/vit E/zinc/copper (PRESERVISION AREDS ORAL) Take by mouth 2 (two) times a day.       warfarin (COUMADIN) 5 MG tablet 5 mg except on wednesday and saturday Wed and sat 2.5 mgs (Patient taking differently: Take 2.5 mg by mouth Daily. 5 mg except on wednesday and saturday Wed and sat 2.5 mgs) 90 tablet 3       SCHEDULED MEDS:   allopurinoL  100 mg Oral QHS    amiodarone  200 mg Oral BID    bisacodyL  10 mg Rectal Daily    calcitRIOL  0.75 mcg Oral Daily    carvediloL  25 mg Oral BID WM    famotidine  40 mg Oral Daily    ferrous sulfate  1 tablet Oral BID    lisinopriL  40 mg Oral QHS    mupirocin   Nasal BID    senna-docusate 8.6-50 mg  1 tablet Oral BID    sodium chloride 0.9%  10 mL Intravenous Q6H    traZODone  50 mg Oral QHS       CONTINUOUS INFUSIONS:    PRN MEDS:sodium chloride, acetaminophen, albuterol sulfate, aluminum-magnesium hydroxide-simethicone, dextrose 10%, dextrose 10%, dextrose, dextrose, fluticasone  propionate, glucagon (human recombinant), HYDROcodone-acetaminophen, HYDROmorphone, ipratropium, melatonin, naloxone, ondansetron, ondansetron, oxyCODONE, oxyCODONE-acetaminophen, prochlorperazine, senna-docusate 8.6-50 mg, simethicone, sodium chloride 0.9%, Flushing PICC Protocol **AND** sodium chloride 0.9% **AND** sodium chloride 0.9%    LABS AND DIAGNOSTICS     CBC LAST 3 DAYS  Recent Labs   Lab 03/24/23  1608 03/25/23  0456 03/27/23  0442   WBC 7.69 9.95 8.69   RBC 2.85* 2.78* 2.56*   HGB 9.4* 9.1* 8.5*   HCT 27.6* 26.5* 25.1*   MCV 97 95 98   MCH 33.0* 32.7* 33.2*   MCHC 34.1 34.3 33.9   RDW 16.6* 16.2* 15.9*    220 185   MPV 10.5 10.5 9.9   GRAN 91.7*  7.1 84.5*  8.4* 79.1*  6.9   LYMPH 5.3*  0.4* 7.8*  0.8* 11.5*  1.0   MONO 0.9*  0.1* 6.7  0.7 7.6  0.7   BASO 0.02 0.01 0.02   NRBC 0 0 0       COAGULATION LAST 3 DAYS  Recent Labs   Lab 03/22/23  0514 03/22/23  1411 03/22/23  2222 03/23/23  0516 03/23/23  1609 03/24/23  0447   INR 1.7* 1.5*  --   --   --  1.1   APTT  --  31.1   < > 49.6* 39.9* 25.8    < > = values in this interval not displayed.       CHEMISTRY LAST 3 DAYS  Recent Labs   Lab 03/20/23  1418 03/21/23  0505 03/24/23  1608 03/25/23  0456 03/27/23  0442      < > 135* 135* 134*   K 4.6   < > 4.6 4.6 4.0   *   < > 104 102 103   CO2 20*   < > 23 23 22*   ANIONGAP 5*   < > 8 10 9   BUN 32*   < > 25* 26* 30*   CREATININE 1.4   < > 1.3 1.2 1.4   *   < > 165* 120* 89   CALCIUM 8.7   < > 8.9 9.1 9.2   MG  --    < > 2.2 2.0 1.9   ALBUMIN 2.4*  --   --   --  2.2*   PROT 5.6*  --   --   --  5.1*   ALKPHOS 91  --   --   --  78   ALT 20  --   --   --  10   AST 46*  --   --   --  35   BILITOT 0.5  --   --   --  0.7    < > = values in this interval not displayed.       CARDIAC PROFILE LAST 3 DAYS  Recent Labs   Lab 03/20/23  1418 03/22/23  0514   * 170       ENDOCRINE LAST 3 DAYS  Recent Labs   Lab 03/20/23  1418 03/21/23  0505   TSH  --  1.009   PROCAL 0.10  --         LAST ARTERIAL BLOOD GAS  ABG  No results for input(s): PH, PO2, PCO2, HCO3, BE in the last 168 hours.    LAST 7 DAYS MICROBIOLOGY   Microbiology Results (last 7 days)       ** No results found for the last 168 hours. **            MOST RECENT IMAGING  X-Ray Cervical Spine AP And Lateral  Narrative: EXAMINATION:  XR CERVICAL SPINE AP LATERAL    CLINICAL HISTORY:  Postop; Unspecified cord compression    TECHNIQUE:  AP, lateral and open mouth views of the cervical spine were performed.    COMPARISON:  None.    FINDINGS:  The patient has had placement of posterior fixation devices bilaterally extending from C3 down to T2.  Cutaneous staples remain in place.  Degenerative disc disease is noted at every level from C3 down to C6 with the lower levels not visible on the lateral film.  Impression: Postop posterior fixation from C3-T2 with hardware in place.  Degenerative disc disease visible from C3-C6.    Electronically signed by: Jonathan Álvarez MD  Date:    03/25/2023  Time:    10:13      ECHOCARDIOGRAM RESULTS (last 5)  Results for orders placed during the hospital encounter of 03/20/23    Echo    Interpretation Summary  · The left ventricle is normal in size with mildly decreased systolic function.  · Left ventricular diastolic dysfunction.  · The estimated ejection fraction is 45%.  · There is mild left ventricular global hypokinesis.  · Normal right ventricular size with normal right ventricular systolic function.  · The aortic root is mildly dilated.  · The ascending aorta is dilated.  · Trivial posterior pericardial effusion.  · Mild aortic regurgitation.  · Mild tricuspid regurgitation.  · Normal central venous pressure (3 mmHg).      Results for orders placed during the hospital encounter of 10/22/21    Echo    Interpretation Summary  · The left ventricle is mildly enlarged with mild concentric hypertrophy and mildly decreased systolic function.  · The estimated ejection fraction is 40%.  · Grade I left  ventricular diastolic dysfunction.  · There are segmental left ventricular wall motion abnormalities. There is mid-inferior dyskinesis  · Mild tricuspid regurgitation.  · Severe left atrial enlargement.  · Mild right ventricular enlargement with normal right ventricular systolic function.      Results for orders placed during the hospital encounter of 05/26/20    Echo Color Flow Doppler? Yes    Interpretation Summary  · Mild concentric left ventricular hypertrophy.  · Moderately decreased left ventricular systolic function. The estimated ejection fraction is 40%.  · Grade I (mild) left ventricular diastolic dysfunction consistent with impaired relaxation.  · Local segmental wall motion abnormalities. There is inferobasilar dyskinesis.  · Mild left ventricular enlargement.  · Mild right ventricular enlargement.  · Mild right atrial enlargement.  · Mild aortic regurgitation.  · Mild tricuspid regurgitation.      CURRENT/PREVIOUS VISIT EKG  Results for orders placed or performed during the hospital encounter of 03/20/23   EKG 12-lead    Collection Time: 03/22/23  1:32 PM    Narrative    Test Reason : Z01.818,    Vent. Rate : 064 BPM     Atrial Rate : 068 BPM     P-R Int : 000 ms          QRS Dur : 150 ms      QT Int : 432 ms       P-R-T Axes : 000 000 -17 degrees     QTc Int : 445 ms    Atrial fibrillation with premature ventricular or aberrantly conducted  complexes  Right bundle branch block  Abnormal ECG  When compared with ECG of 20-MAR-2023 14:10,  Previous ECG has undetermined rhythm, needs review  Confirmed by Juan Francisco GU, Darius KAPLAN ([id number]) on 3/24/2023 3:08:17 PM    Referred By: TONY   SELF           Confirmed By:Darius Chicas MD           ASSESSMENT/PLAN:     Active Hospital Problems    Diagnosis    *Spinal cord compression    Multiple skin tears    Closed nondisplaced fracture of sixth cervical vertebra    Pneumonia    Warfarin anticoagulation    Chronic systolic congestive heart failure    Anemia due to  stage 3 chronic kidney disease    Generalized weakness    Stasis dermatitis of both legs    Paroxysmal atrial fibrillation     EKG stable  Stable on Coreg, no missed doses of anticoagulation. Continue anticoagulation as described below      CKD (chronic kidney disease) stage 3, GFR 30-59 ml/min, 41    GERD (gastroesophageal reflux disease)    Diabetes mellitus with chronic kidney disease, stage III    Hypertension associated with diabetes    MTHFR mutation (methylenetetrahydrofolate reductase)    CAD (coronary artery disease)        Assessment/Plan:     1. Paroxysmal atrial fibrillation  2. Status post fall and neck injury status post multiple level laminectomy  3. Coronary artery disease  4. Chronic anticoagulation with warfarin  5. Chronic systolic congestive heart failure   6. Pneumonia        RECOMMENDATIONS:  1. Patient converted back to sinus rhythm currently on amiodarone 200 mg p.o. b.i.d. continue the same.  2. Recommend magnesium sulfate 1 g IVPB now and magnesium oxide 400 mg p.o. q.h.s..  3. Continue carvedilol 25 mg p.o. b.i.d..  And continue lisinopril 40 mg p.o. q.h.s. and watch for the heart rate his current heart rate is 71 beats per minute in sinus rhythm.  4.  His echo shows LV ejection fraction of 40% with severe left atrial enlargement.  Mild RV dilatation.  5. Patient is on chronic anticoagulation with warfarin need to resume his anticoagulation at the earliest possible time when okay with neurosurgery.  6. EKG now and in a.m.  7. Further recommendations to follow thank you for the consultation.        Klaus Castle MD  Date of Service: 03/27/2023  9:43 AM

## 2023-03-27 NOTE — PLAN OF CARE
Problem: Adult Inpatient Plan of Care  Goal: Plan of Care Review  Outcome: Ongoing, Progressing  Goal: Patient-Specific Goal (Individualized)  Outcome: Ongoing, Progressing  Goal: Absence of Hospital-Acquired Illness or Injury  Outcome: Ongoing, Progressing  Goal: Optimal Comfort and Wellbeing  Outcome: Ongoing, Progressing  Goal: Readiness for Transition of Care  Outcome: Ongoing, Progressing     Problem: Infection (Pneumonia)  Goal: Resolution of Infection Signs and Symptoms  Outcome: Ongoing, Progressing     Problem: Respiratory Compromise (Pneumonia)  Goal: Effective Oxygenation and Ventilation  Outcome: Ongoing, Progressing     Problem: Fall Injury Risk  Goal: Absence of Fall and Fall-Related Injury  Outcome: Ongoing, Progressing     Problem: Skin Injury Risk Increased  Goal: Skin Health and Integrity  Outcome: Ongoing, Progressing     Problem: Impaired Wound Healing  Goal: Optimal Wound Healing  Outcome: Ongoing, Progressing     Problem: Infection  Goal: Absence of Infection Signs and Symptoms  Outcome: Ongoing, Progressing

## 2023-03-27 NOTE — PT/OT/SLP PROGRESS
"Occupational Therapy   Treatment    Name: Richard Bustos  MRN: [id number]  Admitting Diagnosis:  Spinal cord compression  3 Days Post-Op    Recommendations:     Discharge Recommendations: nursing facility, skilled  Discharge Equipment Recommendations:  other (see comments) (TBD)  Barriers to discharge:  Other (Comment) (increased physical assist for mobility and ADLs.)    Assessment:     Richard Bustos is a 77 y.o. male with a medical diagnosis of Spinal cord compression.  He presents with improved LUE AROM and tolerance of use, remains ataxic with decreased BUE coordination and sensation (especially in the hands). Pt with edema of LUE (> in the hand) and multiple bruises from fall. Pt's FM incoordination affecting eating. Performance deficits affecting function are weakness, impaired endurance, impaired self care skills, impaired functional mobility, decreased upper extremity function, decreased lower extremity function, decreased coordination, pain, impaired skin, edema, abnormal tone, decreased ROM, decreased safety awareness, visual deficits, impaired cognition, impaired fine motor, impaired coordination.     Rehab Prognosis:  Good; patient would benefit from acute skilled OT services to address these deficits and reach maximum level of function.       Plan:     Patient to be seen 5 x/week to address the above listed problems via self-care/home management, therapeutic activities, therapeutic exercises, neuromuscular re-education, sensory integration  Plan of Care Expires: 04/22/23  Plan of Care Reviewed with: patient    Subjective     Chief Complaint: "I can't feel it in my hand"  Patient/Family Comments/goals: "get back on my own again"  Pain/Comfort:  Pain Rating 1: 3/10  Location 1: shoulder  Pain Addressed 1: Reposition, Distraction, Cessation of Activity  Pain Rating Post-Intervention 1: 3/10    Objective:     Communicated with: Callie DELUCA prior to session.  Patient found HOB elevated with   upon OT entry to " room.    General Precautions: Standard, fall    Orthopedic Precautions:spinal precautions  Braces: Aspen collar  Respiratory Status: Room air     Occupational Performance:     Bed Mobility:    Not tested this session; unneeded for intervention planned.    Functional Mobility/Transfers:  See above  Functional Mobility: refer to PT for pt status this date.    Activities of Daily Living:  Pt reports self-feeding is difficult 2° decreased coordination and sensation of BUE. Pillow support to 90° may decreased frustration with feeding.    [facility] 6 Click ADL: 13    Treatment & Education:  -with HOB raised, pt participated in NM re-education utilizing the following: LUE AAROM for abduction to 90° x 3 trials with 30sec hold each and ModA to maintain hold, Aristides L shoulder flexion to 90° 2 x 10, demo and intermittent CGA for L supination 2 x 10, demo and ModA>CGA serial opposition 2 x 2 full sequences each hand, B integration activity passing ball 2 x 5 and with ModA to LUE and Aristides to RUE. HEP written on white board. Pt will require reinforcement.    Patient left  as found  with call button in reach and Callie DELUCA notified    GOALS:   Multidisciplinary Problems       Occupational Therapy Goals          Problem: Occupational Therapy    Goal Priority Disciplines Outcome Interventions   Occupational Therapy Goal     OT, PT/OT     Description: Goals to be met by: 4/22/23     Patient will increase functional independence with ADLs by performing:    Feeding with Set-up Assistance.  UE Dressing with Minimal Assistance.  LE Dressing with Moderate Assistance.  Grooming while seated with Minimal Assistance.  Toileting from bedside commode with Maximum Assistance for hygiene and clothing management.   Bathing from  shower chair/bench with Maximum Assistance.  Toilet transfer to bedside commode with Maximum Assistance.  Increased strength and functional activity tolerance to WFL's for ADL's/IADL's.                         Time Tracking:      OT Date of Treatment: 03/27/23  OT Start Time: 1250  OT Stop Time: 1316  OT Total Time (min): 26 min    Billable Minutes:Neuromuscular Re-education 26 min    OT/MARZENA: MARZENA     Number of MARZENA visits since last OT visit: 1    3/27/2023

## 2023-03-27 NOTE — SUBJECTIVE & OBJECTIVE
Interval History:  Patient seen and examined.  No acute events overnight.  Feels good today.  Pain is well controlled.  Daughters are at bedside.  Discussed plan of care and answered all questions.    Review of Systems   Constitutional:  Negative for chills, diaphoresis and fever.   Respiratory:  Negative for cough, shortness of breath and wheezing.    Cardiovascular:  Negative for chest pain and palpitations.   Gastrointestinal:  Negative for abdominal pain, diarrhea, nausea and vomiting.   Musculoskeletal:  Positive for arthralgias, gait problem and myalgias.   Skin:  Positive for color change and wound.   Neurological:  Positive for weakness. Negative for dizziness and headaches.   Hematological:  Bruises/bleeds easily.   Psychiatric/Behavioral:  Positive for confusion. The patient is not nervous/anxious.    All other systems reviewed and are negative.  Objective:     Vital Signs (Most Recent):  Temp: 97.8 °F (36.6 °C) (03/27/23 1114)  Pulse: 62 (03/27/23 1114)  Resp: 16 (03/27/23 1114)  BP: 127/69 (03/27/23 1114)  SpO2: 96 % (03/27/23 1114)   Vital Signs (24h Range):  Temp:  [97.2 °F (36.2 °C)-97.9 °F (36.6 °C)] 97.8 °F (36.6 °C)  Pulse:  [62-83] 62  Resp:  [16-18] 16  SpO2:  [93 %-96 %] 96 %  BP: (103-132)/(55-69) 127/69     Weight: 112 kg (246 lb 14.6 oz)  Body mass index is 36.46 kg/m².    Intake/Output Summary (Last 24 hours) at 3/27/2023 1404  Last data filed at 3/27/2023 0423  Gross per 24 hour   Intake 450 ml   Output 100 ml   Net 350 ml        Physical Exam  Constitutional:       General: He is not in acute distress.     Appearance: He is well-developed. He is obese.   HENT:      Head: Contusion (forehead) and laceration (right side of nose, right temporal staples noted to scalp.) present.   Neck:      Comments: Cervical collar in place (hard)  Cardiovascular:      Rate and Rhythm: Normal rate and regular rhythm.      Pulses: Normal pulses.      Heart sounds: Murmur heard.   Pulmonary:      Effort:  Pulmonary effort is normal. Tachypnea present.      Breath sounds: Normal breath sounds.   Abdominal:      General: Bowel sounds are normal. There is no distension.      Palpations: Abdomen is soft.      Tenderness: There is no abdominal tenderness. There is no guarding.   Musculoskeletal:         General: Tenderness present.      Right lower leg: Edema present.      Left lower leg: Edema present.      Comments: Decreased ROM R shoulder   Skin:     General: Skin is warm and dry.      Findings: Bruising and rash (chronic skin changes to BLE) present.      Comments: Diffuse ecchymosis face and neck   Neurological:      General: No focal deficit present.      Mental Status: He is alert.      Motor: Weakness (left upper extremity) present.      Comments: Oriented to self, year, president.         Significant Labs: All pertinent labs within the past 24 hours have been reviewed.  CBC:   Recent Labs   Lab 03/27/23  0442   WBC 8.69   HGB 8.5*   HCT 25.1*          CMP:   Recent Labs   Lab 03/27/23  0442   *   K 4.0      CO2 22*   GLU 89   BUN 30*   CREATININE 1.4   CALCIUM 9.2   PROT 5.1*   ALBUMIN 2.2*   BILITOT 0.7   ALKPHOS 78   AST 35   ALT 10   ANIONGAP 9       Magnesium:   Recent Labs   Lab 03/27/23  0442   MG 1.9         Significant Imaging: I have reviewed all pertinent imaging results/findings within the past 24 hours.

## 2023-03-27 NOTE — CARE UPDATE
03/26/23 1951   Patient Assessment/Suction   Level of Consciousness (AVPU) alert   Respiratory Effort Normal;Unlabored   Expansion/Accessory Muscles/Retractions no use of accessory muscles   All Lung Fields Breath Sounds coarse   Rhythm/Pattern, Respiratory no shortness of breath reported   Cough Frequency no cough   PRE-TX-O2   Device (Oxygen Therapy) room air   SpO2 96 %   Pulse Oximetry Type Intermittent   Aerosol Therapy   $ Aerosol Therapy Charges PRN treatment not required   Incentive Spirometer   $ Incentive Spirometer Charges done with encouragement   Administration (IS) mouthpiece utilized;proper technique demonstrated   Number of Repetitions (IS) 10   Level Incentive Spirometer (mL) 1500   Patient Tolerance (IS) good;no adverse signs/symptoms present     Noticed Home CPAP sitting on table, asked patient if he needed help getting it set-up. Patient stated that he was not wearing it at this time.

## 2023-03-27 NOTE — PLAN OF CARE
Left VM for Lizzeth with PHN to notify that patient is potentially medically ready today VS tomorrow. Accepted at [facility] for [facility]       03/27/23 1138   Post-Acute Status   Post-Acute Authorization Placement   Post-Acute Placement Status Pending payor review/awaiting authorization (if required)

## 2023-03-27 NOTE — ASSESSMENT & PLAN NOTE
IV rocephin-completed 3 day course azithromycin  brochodilators prn  Supplemental oxygen as needed   Check procalcitonin-WNL  Continue IS post operatively  Repeat CXR clear

## 2023-03-27 NOTE — CLINICAL REVIEW
"[facility]   Hematology/Oncology  Inpatient Progress Note          Patient Name: Richard Bustos  MRN: [id number]  Admission Date: 3/20/2023  Hospital Length of Stay: 5 days  Code Status: Prior   Attending Provider: Bill Gamino MD  Consulting Provider: Kai Ortiz MD  Primary Care Physician: Familia Ramirez Jr, MD  Principal Problem:Spinal cord compression      Subjective:       Patient ID: Richard Bustos is a 77 y.o. male.    Chief Complaint: Extremity Weakness (Pt had a fall Friday at his home and was seen at [facility], since being discharged pt reports weakness in both legs.  /)        History Present Illness:    Patient underwent surgery with Dr Kc on 3/24 with partial C2 and T1 laminectomy; total C3, C4, C5, C6, C7 laminectomy; and implantation of a cell based allograft; he remains in the hospital post-op; he is awake and alert; answering questions; no CP, HA's or N/V    ROS:     GEN: general malaise; neck/back pain; chronic leg and foot issues  HEENT: normal with no HA's, sore throat, stiff neck, changes in vision  CV: normal with no CP, SOB, PND, THOMPSON or orthopnea  PULM: normal with no SOB, cough, hemoptysis, sputum or pleuritic pain  GI: normal with no abdominal pain, nausea, vomiting, constipation, diarrhea, melanotic stools, BRBPR, or hematemesis  : normal with no hematuria, dysuria  BREAST: normal with no mass, discharge, pain  SKIN: numerous ecchymosis involving neck/face/head; laceration on forehead       Objective:     Vitals:  Blood pressure 127/69, pulse 62, temperature 97.8 °F (36.6 °C), temperature source Axillary, resp. rate 16, height 5' 9" (1.753 m), weight 112 kg (246 lb 14.6 oz), SpO2 96 %.    GEN: no apparent distress, comfortable; AAOx3; overweight  HEAD: numerous ecchymosis involving neck/face/head; laceration on forehead; swelling from trauma but improved  EYES: no pallor, no icterus, PERRLA  ENT: OMM, no pharyngeal erythema, external ears WNL; no nasal discharge; no " thrush  NECK: no masses, thyroid normal, trachea midline, no LAD/LN's, supple; neck/back in brace  CV: RRR with no murmur; normal pulse; normal S1 and S2; no pedal edema  CHEST: Normal respiratory effort; CTAB; normal breath sounds; no wheeze or crackles  ABDOM: nontender and nondistended; soft; normal bowel sounds; no rebound/guarding; overweight  MUSC/Skeletal: ROM normal; no crepitus; joints normal; no deformities or arthropathy; uses cane/walker to walk  EXTREM: erythematous changes on bilateral lower extremities;feet are better per patient; left foot in boot    SKIN: no rashes, petechiae or subcutaneous nodules; prior skin cancer on face s/p cream (resolved); chronic skin changes; numerous ecchymosis but imrpoved  : no mcdaniels  NEURO: grossly intact; motor/sensory WNL; AAOx3; no tremors; numerous ecchymosis involving neck/face/head; laceration on forehead  PSYCH: normal mood, affect and behavior  LYMPH: normal cervical, supraclavicular, axillary and groin LN's         Lab Review:   Lab Results   Component Value Date    WBC 8.69 03/27/2023    HGB 8.5 (L) 03/27/2023    HCT 25.1 (L) 03/27/2023    MCV 98 03/27/2023     03/27/2023       CMP  Sodium   Date Value Ref Range Status   03/27/2023 134 (L) 136 - 145 mmol/L Final     Potassium   Date Value Ref Range Status   03/27/2023 4.0 3.5 - 5.1 mmol/L Final     Chloride   Date Value Ref Range Status   03/27/2023 103 95 - 110 mmol/L Final     CO2   Date Value Ref Range Status   03/27/2023 22 (L) 23 - 29 mmol/L Final     Glucose   Date Value Ref Range Status   03/27/2023 89 70 - 110 mg/dL Final     BUN   Date Value Ref Range Status   03/27/2023 30 (H) 8 - 23 mg/dL Final     Creatinine   Date Value Ref Range Status   03/27/2023 1.4 0.5 - 1.4 mg/dL Final     Calcium   Date Value Ref Range Status   03/27/2023 9.2 8.7 - 10.5 mg/dL Final     Total Protein   Date Value Ref Range Status   03/27/2023 5.1 (L) 6.0 - 8.4 g/dL Final     Albumin   Date Value Ref Range Status    03/27/2023 2.2 (L) 3.5 - 5.2 g/dL Final     Total Bilirubin   Date Value Ref Range Status   03/27/2023 0.7 0.1 - 1.0 mg/dL Final     Comment:     For infants and newborns, interpretation of results should be based  on gestational age, weight and in agreement with clinical  observations.    Premature Infant recommended reference ranges:  Up to 24 hours.............<8.0 mg/dL  Up to 48 hours............<12.0 mg/dL  3-5 days..................<15.0 mg/dL  6-29 days.................<15.0 mg/dL       Alkaline Phosphatase   Date Value Ref Range Status   03/27/2023 78 55 - 135 U/L Final     AST   Date Value Ref Range Status   03/27/2023 35 10 - 40 U/L Final     ALT   Date Value Ref Range Status   03/27/2023 10 10 - 44 U/L Final     Anion Gap   Date Value Ref Range Status   03/27/2023 9 8 - 16 mmol/L Final     eGFR   Date Value Ref Range Status   03/27/2023 52 (A) >60 mL/min/1.73 m^2 Final              Radiology Diagnostic Studies:           Assessment:     IMPRESSION:    (1) 77 y.o. 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 He was last seen in Hem/onc clinic back in Sept 2022. He was previously under the care of Dr Krunal Rodriguez with hematology/oncology at [facility] for several years.  He has been on coumadin therapy per direction of Dr Tate with cardiology for his cardiac issues and is monitored by [facility]. I do not direct or manage his coumadinization. He had recent fall at home and was inpatient at [facility] in [place]. After discharge from [facility], he presented to [facility] with weakness in lower extremities. MRI's are on chart of the spine. He has been seen by Dr Kike Kc with neurosurgery and is being considered for surgery at some point in the future.     3/25/2023:  - Patient underwent surgery with Dr Kc yesterday on 3/24 with partial C2 and T1 laminectomy; total C3, C4, C5, C6, C7  laminectomy; and implantation of a cell based allograft  - wbc WNL; hgb at 9.1; plats wnl at 220,000  - cardiology following    3/27/2023:  - post-op day #3  - hgb at 8.5  - cardiology following     (2) CAD/atrial fibrillation and CHF - followed by Dr Tate with [facility]  - He has been on coumadin therapy per direction of Dr Tate with cardiology for his cardiac issues and is monitored by [facility].  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   - He was previously under the care of Dr Krunal Rodriguez with hematology/oncology at [facility] for several years  - He has been on coumadin therapy per direction of Dr Tate with cardiology  for his cardiac issues and is monitored by [facility].  - I do not monitor or direct his coumadinization     (4) CRI stage 3B - followed by Nephrology       (5) Chronic multifactorial anemia with underlying anemia of chronic disorders and chronic renal disease; chronic GIB     (6) Diabetes with Diabetic ulcer/ left toe issues/chronic stasis ulcers/dermatitis - followed by Dr Torrez with podiatry     (7) Prostate issues - followed by Dr Moss     (8) GERD          1. Difficulty walking    2. Generalized weakness    3. Pneumonia    4. Chest pain    5. CAD (coronary artery disease)    6. Pre-operative clearance    7. Spinal cord compression [G95.20 (ICD-10-CM)]    8. Encounter for screening for cardiovascular disorders    9. Spinal cord compression    10. Coronary artery disease involving native coronary artery of native heart without angina pectoris    11. Cervical stenosis of spine    12. Closed nondisplaced fracture of sixth cervical vertebra, unspecified fracture morphology, initial encounter    13. Paroxysmal atrial fibrillation           Plan:     PLAN:           Monitor labs and transfuse as needed  Coumadin and/or anticoagulation management as per cardiology directives  Post-op management as per Dr Kc  Will continue to follow at distance remotely - please call should you need hematology for anything                Kai Ortiz MD  Hematology/Oncology  [facility]

## 2023-03-27 NOTE — PROGRESS NOTES
Postop AFib noted.  Now in normal sinus rhythm.  Cardiology consulted.    He reports minimal incision pain.  He reports subjective increased strength in the bilateral lower extremities.  Reports improved right arm and hand function.      PT notes reviewed     Postoperative x-rays reviewed.    On exam he is resting comfortably in bed.  Awake, alert, intermittently confused.  RODNEY.  Motor exam stable.  Sensation grossly intact.  Incision is inspected, dressing clean dry intact.  Cervical collar in place.    Impression:     Acute central cord syndrome, status post mechanical fall with underlying severe multilevel cervical stenosis.  Status post uneventful posterior cervical decompression with instrumented fusion C3-T2.      Preoperative severe lumbar stenosis with neurogenic claudication.      Multiple medical comorbidities including long-term use of anticoagulants    Recommendations:     Continue current care  He will benefit from inpatient rehab  Resume anticoagulants as per cardiology recommendations, he should be on minimum of Lovenox 40 mg subQ daily.  Front of collar may be removed while patient is at rest.  Reapply prior to mobilizing patient.  Remove surgical dressing.  Cleanse wound b.i.d. with Hibiclens.  Apply gauze with nonocclusive tape b.i.d..  Continue surgical staples until outpatient follow-up  Follow-up as outpatient in 2-3 weeks with cervical spine x-rays  Anticipate lumbar decompression after adequate recovery  Please call with questions or concerns

## 2023-03-27 NOTE — PT/OT/SLP PROGRESS
"Physical Therapy Treatment    Patient Name:  Richard Bustos   MRN:  [id number]    Recommendations:     Discharge Recommendations: nursing facility, skilled  Discharge Equipment Recommendations: other (see comments) (TBD)  Barriers to discharge:  Balance and mobility deficits    Assessment:     Richard Bustos is a 77 y.o. male admitted with a medical diagnosis of Spinal cord compression.  He presents with the following impairments/functional limitations: weakness, impaired endurance, impaired self care skills, impaired functional mobility, decreased upper extremity function, decreased lower extremity function, decreased coordination, pain, impaired skin, edema, orthopedic precautions.    Improved sitting tolerance today x 10-12 minutes EOB. Once positioned with block to feet and with BUEs on mattress at sides for support, pt was able to maintain sit balance w/o assist x 2 minutes.     Pt and family educated in supine BLE exercises to maintain strength. Cervical collar adjusted in supine for improved placement. LUE propped on pillow for decreased edema    Good participation and family support.    Rehab Prognosis: Good and Fair; patient would benefit from acute skilled PT services to address these deficits and reach maximum level of function.    Recent Surgery: Procedure(s) (LRB):  FUSION, SPINE, CERVICAL (N/A) 3 Days Post-Op    Plan:     During this hospitalization, patient to be seen daily to address the identified rehab impairments via gait training, therapeutic activities, therapeutic exercises and progress toward the following goals:    Plan of Care Expires:  04/21/23    Subjective     Chief Complaint: LUE deficits and swelling   Patient/Family Comments/goals: "get strength back"  Pain/Comfort:  Pain Rating 1: 0/10  Pain Rating Post-Intervention 1: 0/10      Objective:     Communicated with nurse Ledesma prior to session.  Patient found HOB elevated with bed alarm, cervical collar, peripheral IV, telemetry " (TeleSitter) upon PT entry to room.     General Precautions: Standard, fall  Orthopedic Precautions: spinal precautions  Braces: Aspen collar  Respiratory Status: Room air     Functional Mobility:  Bed Mobility:     Supine to Sit: maximal assistance and of 2 persons  Sit to Supine: maximal assistance and of 2 persons      AM-PAC 6 CLICK MOBILITY          Treatment & Education:  -Supine hip ab/adduction with assist for form and ROM  -Sitting balance EOB 10-12 minutes, with ability to maintain balance with 2UE support on mattress and no assist from therapist x 2 minutes   -Rolling R/L in bed with total A for improved positioning of cervical collar     Patient left HOB elevated with all lines intact, call button in reach, bed alarm on, nursing notified, and daughters present..    GOALS:   Multidisciplinary Problems       Physical Therapy Goals          Problem: Physical Therapy    Goal Priority Disciplines Outcome Goal Variances Interventions   Physical Therapy Goal     PT, PT/OT Ongoing, Progressing     Description: Goals to be met by: 2023     Patient will increase functional independence with mobility by performin. Supine to sit with MInimal Assistance  2. Sit to stand transfer with Moderate Assistance  3. Bed to chair transfer with Maximum Assistance using Rolling Walker  4. Gait  x 10 feet with Maximum Assistance using Rolling Walker.   5. Sitting at edge of bed x20 minutes with Minimal Assistance  3. Lower extremity exercise program x20 reps     3/25/23:  Patient reevaluated for PT this morning and all goals remain appropriate.                       Time Tracking:     PT Received On: 23  PT Start Time: 952     PT Stop Time: 1015  PT Total Time (min): 23 min     Billable Minutes: Therapeutic Activity 23    Treatment Type: Treatment  PT/PTA: PTA     Number of PTA visits since last PT visit: 2     2023

## 2023-03-27 NOTE — OP NOTE
Date of procedure:  March 24, 2023     Preoperative diagnosis:    1. Central cord syndrome    2. Multifocal cervical stenosis, severe    3. Acute C6 lamina fracture    3. Acute multifocal cervical ligamentous injury    4. Multifocal lumbar spinal stenosis, severe      5. Neurogenic claudication      6. Pneumonia     7. Long-term anticoagulants    8. Morbid obesity      9. Mechanical fall    Postoperative diagnosis:     1. Same     Procedures:    1. Partial C2 and T1 laminectomy, medial facetectomy   2. Total C3, C4, C5, C6, C7 laminectomy, medial facetectomy  3. Posterior instrumentation bilateral C3-T2 using stereotactic navigation  4. Posterolateral arthrodesis C3-4, C4-5, C5-6, C6-7, C7-T1, T1-T2  5. Harvesting local autograft  6. Implantation of cell based allograft  7. Intraoperative neuro monitoring     Surgeon: Kike Kc DO, MBA    Assistant:  See medical record     Anesthesia:  General endotracheal and Exparel    Estimated blood loss:  300 cc     Specimens:  None     Complications:  None     Blood products:  1 unit platelets and 1 unit RBC given preoperatively     Operative procedure:  Upon obtaining informed written consent the patient was brought to the operating room where adequate IV access was established.  Following anesthesia induction he was gently endotracheally intubated using video laryngoscopy without difficulty while maintaining the cervical spine in neutral position.  A Ruelas catheter was placed.  A right radial arterial line was placed without difficulty.  Neuro monitoring leads were placed in the usual fashion.  Baseline neuro monitoring signals were established.  SSEP and motor-evoked potentials were markedly diminished in the bilateral upper extremities and minimally present in the bilateral lower extremities.  A Mays head charlton was placed without difficulty.  A rigid cervical orthosis was placed.  The patient was positioned prone on an operative table.  Torso was supported with  gel rolls.  The arms were padded and tucked at the sides.  The Mays was secured to the table while maintaining the cervical spine in neutral position with maintain sagittal balance.  Post post positioning neuro monitoring signals were unchanged.  The collar was removed.  The shoulders were retracted with tape.  The patient was positioned with the head of bed elevated, knees flexed.  All bony prominences were padded.  The posterior cervical region was clipped prepped and draped in sterile fashion.  Ancef was given within 1 hour of incision time.  1 unit of platelets and packed red cells were given preoperatively.  Surgical time-out was performed.  Lateral fluoroscopy confirmed the C2 through T2 spinal segments.  Marcaine with epinephrine was injected at the incision site.  A midline incision extending from the spinous process of C2-T2 was made with a 10 blade scalpel.  Subperiosteal dissection was carried out with electrocautery exposing the posterior elements C3 to T2.  The inferior half of the C2 lamina was exposed.  Fluoroscopy confirmed the C2 through T2 segments.  A spinous reference frame was secured to the T2 spinous process.  The surgical wound was covered with sterile drapes.  The O arm was placed over the surgical field and image acquisition was carried out.  The O arm was moved from the surgical field while maintaining sterility.  The intraoperative CT images were reviewed.  Stereotactic navigation accuracy was confirmed.  Using stereotactic navigation bilateral pedicle screws were placed at T1 and T2.  Prior to to pedicle screw placement the tracts were palpated and were confirmed as competent circumferentially.  Next using a Leksell rongeur the spinous process and interspinous ligaments were resected from C3-C7.  Using stereotactic navigation and intraoperative fluoroscopy bilateral lateral mass screws were placed at C3, C4, C5, and C6.  The screw tracts were palpated and confirmed to be competent  circumferentially prior to screw placement.  Intraoperative fluoroscopy confirmed adequate position of the spinal implants.  Neuro monitoring signals remained unchanged.  Next, the microscope was draped and brought to the operative field.  Using a high-speed bur and Kerrison rongeurs the inferior 50% of C2 lamina was resected.  The superior 50% of T1 lamina was resected.  Total laminectomies and medial facetectomies were performed at C3, C4, C5, C6, and C7.  The right C6 lamina outer cortex was fractured.  The fragment was easily elevated and removed.  The ligamentum flavum was  from the dura, resected in piecemeal fashion.  The spinal cord was previously severely compressed with infolding of the dura noted at C6-7.  Neuro monitoring signals remained unchanged.  Under microscopic vision, the spinal canal was carefully inspected for other areas of external compression and none was identified.  Meticulous epidural hemostasis was obtained.  Previously harvested autograft was morselized and combined with cell based allograft.  The posterolateral elements were decorticated and packed with autograft and allograft.  Bilateral arthrodesis was performed at C3-4, C4-5, C5-6, C6-7, C7-T1, T1-T2.  Titanium rods were then sized, contoured, and secured to the lateral mass screws.  Final tightening was performed.  Post tightening fluoroscopy confirmed stability of the implants.  A Hemovac drain was placed in the subfascial plane and tunneled remote incision site and secured.  The muscle was reapproximated interrupted fashion with absorbable suture.  The fascia was reapproximated in interrupted fashion with absorbable suture.  Exparel 30 cc was infused into the paraspinal musculature bilaterally.  The subcutaneous tissue was reapproximated in multiple layers with absorbable suture interrupted fashion.  Surgical staples were placed.  A sterile surgical dressing was placed.  Hemovac drain was connected to full suction.  A  rigid cervical orthosis was placed.  The patient was carefully positioned supine on a stretcher.  The Mays head charlton was removed.  Pin sites were inspected.  Bleeding was noted from the right temporal region.  Two surgical staples were placed with hemostasis achieved.  The patient was extubated in the operating room transferred to the recovery room in hemodynamically stable condition.  Sponge and needle counts were correct.  Neuro monitoring signals were stable throughout the procedure.  There were no intraoperative complications.

## 2023-03-27 NOTE — TELEPHONE ENCOUNTER
----- Message from Mahsa Randolph RN sent at 3/27/2023  3:56 PM CDT -----  Regarding: post op apt  Good afternoon,    This patient needs a post op apt scheduled, please. I'm hoping he will DC to SNF tomorrow. Thank you!

## 2023-03-27 NOTE — ASSESSMENT & PLAN NOTE
Consult hematology for recommendations regarding anticoagulation-daughter prefers to avoid lovenox-hematology states they do not manage-will defer to cardiology.  Placed on IV heparin drip-warfarin stopped  Heparin stopped prior to surgery  Will resume anticoagulation once cleared by Neurosurgery per Cardiology recommendations

## 2023-03-27 NOTE — PLAN OF CARE
Problem: Adult Inpatient Plan of Care  Goal: Plan of Care Review  Outcome: Ongoing, Progressing     Problem: Fall Injury Risk  Goal: Absence of Fall and Fall-Related Injury  Outcome: Ongoing, Progressing     Problem: Impaired Wound Healing  Goal: Optimal Wound Healing  Outcome: Ongoing, Progressing

## 2023-03-27 NOTE — PROGRESS NOTES
[facility] Hospital Medicine  Progress Note    Patient Name: Richard Bustos  MRN: [id number]  Patient Class: IP- Inpatient   Admission Date: 3/20/2023  Length of Stay: 5 days  Attending Physician: Bill Gamino MD  Primary Care Provider: Familia Ramirez Jr, MD        Subjective:     Principal Problem:Spinal cord compression        HPI:  Richard Bustos is a 78 y/o M with a past medical history significant for anemia, CAD, CHF, CKD, atrial fibrillation on warfarin, DM2, HLD, HTN, and GERD who presented to the ED for further evaluation of BLE weakness which began following a fall 3 days ago.  Pt reports that he was walking down a ramp at home when he slipped due to the ramp being wet.  He fell to his knees, but continued the fall onto his face and head and had positive loss of consciousness.  He was initially seen at [facility] ED where he was transferred to [facility].  He reports an extensive workup with no fractures seen and was ultimately discharged home.  He reports difficulty bearing weight since the fall and feels substantially weaker than his baseline.  He reports that he normally uses a cane for ambulation, but has not been able to ambulate at all since his fall 3 days ago.  Today in the ED, CT head is significant for frontal scalp hematoma with no acute intracranial injury seen; bilateral nasal bone fractures are noted.  CXR is concerning for pneumonia.  He will be placed in observation under the service of hospital medicine for continued monitoring and treatment.      Overview/Hospital Course:  Richard Bustos was monitored closely during his hospitalization.  He was placed on IV rocephin and IV azithromycin in treatment of pneumonia.  An xray of his right shoulder was performed due to continued c/o pain and decreased ROM following his fall with no fracture or dislocation seen.  PT/OT were consulted.  An MRI of the brain was obtained with no acute findings.  MRI cervical spine concerning for C6 fractures,  neural foraminal and spinal canal stenosis, severe cansl stenosis cord compression C5-6 and mild to moderate cord flattening at C3-4, and C4-5, and other findings compatible with possible trauma as correlates with given history.  MRI lumbar spine showed multilevel degenerative changes, spinal canal stenosis and neural foraminal stenosis.  It was the recommendation of the neurosurgeon that pt undergo posterior cervical decompression/fusion C3-T1 with lumbar surgery to follow at a later date.  Due to the complexity of his health history, cardiology and hematology were consulted.  ASA and plavix were held. Coumadin was held and he was placed on a heparin drip in anticipation of surgery.  Cardiology recommended nuclear med stress test.  He was transferred to [facility] for come and go procedure-NM stress test with no reversible ischemia noted, so he received cardiac clearance for surgery.  On 3/24/23, Dr. Kc performed laminectomy, medial facetectomy of C2-T1.  Postoperatively, he continued to work with PT/OT.  Hard cervical collar was kept in place.  Pain was controlled with IV and oral narcotics.  He experienced some bouts of confusion, but was easily reoriented.        Interval History:  Patient seen and examined.  No acute events overnight.  Feels good today.  Pain is well controlled.  Daughters are at bedside.  Discussed plan of care and answered all questions.    Review of Systems   Constitutional:  Negative for chills, diaphoresis and fever.   Respiratory:  Negative for cough, shortness of breath and wheezing.    Cardiovascular:  Negative for chest pain and palpitations.   Gastrointestinal:  Negative for abdominal pain, diarrhea, nausea and vomiting.   Musculoskeletal:  Positive for arthralgias, gait problem and myalgias.   Skin:  Positive for color change and wound.   Neurological:  Positive for weakness. Negative for dizziness and headaches.   Hematological:  Bruises/bleeds easily.   Psychiatric/Behavioral:  Positive  for confusion. The patient is not nervous/anxious.    All other systems reviewed and are negative.  Objective:     Vital Signs (Most Recent):  Temp: 97.8 °F (36.6 °C) (03/27/23 1114)  Pulse: 62 (03/27/23 1114)  Resp: 16 (03/27/23 1114)  BP: 127/69 (03/27/23 1114)  SpO2: 96 % (03/27/23 1114)   Vital Signs (24h Range):  Temp:  [97.2 °F (36.2 °C)-97.9 °F (36.6 °C)] 97.8 °F (36.6 °C)  Pulse:  [62-83] 62  Resp:  [16-18] 16  SpO2:  [93 %-96 %] 96 %  BP: (103-132)/(55-69) 127/69     Weight: 112 kg (246 lb 14.6 oz)  Body mass index is 36.46 kg/m².    Intake/Output Summary (Last 24 hours) at 3/27/2023 1404  Last data filed at 3/27/2023 0423  Gross per 24 hour   Intake 450 ml   Output 100 ml   Net 350 ml        Physical Exam  Constitutional:       General: He is not in acute distress.     Appearance: He is well-developed. He is obese.   HENT:      Head: Contusion (forehead) and laceration (right side of nose, right temporal staples noted to scalp.) present.   Neck:      Comments: Cervical collar in place (hard)  Cardiovascular:      Rate and Rhythm: Normal rate and regular rhythm.      Pulses: Normal pulses.      Heart sounds: Murmur heard.   Pulmonary:      Effort: Pulmonary effort is normal. Tachypnea present.      Breath sounds: Normal breath sounds.   Abdominal:      General: Bowel sounds are normal. There is no distension.      Palpations: Abdomen is soft.      Tenderness: There is no abdominal tenderness. There is no guarding.   Musculoskeletal:         General: Tenderness present.      Right lower leg: Edema present.      Left lower leg: Edema present.      Comments: Decreased ROM R shoulder   Skin:     General: Skin is warm and dry.      Findings: Bruising and rash (chronic skin changes to BLE) present.      Comments: Diffuse ecchymosis face and neck   Neurological:      General: No focal deficit present.      Mental Status: He is alert.      Motor: Weakness (left upper extremity) present.      Comments: Oriented to  self, year, president.         Significant Labs: All pertinent labs within the past 24 hours have been reviewed.  CBC:   Recent Labs   Lab 03/27/23  0442   WBC 8.69   HGB 8.5*   HCT 25.1*          CMP:   Recent Labs   Lab 03/27/23  0442   *   K 4.0      CO2 22*   GLU 89   BUN 30*   CREATININE 1.4   CALCIUM 9.2   PROT 5.1*   ALBUMIN 2.2*   BILITOT 0.7   ALKPHOS 78   AST 35   ALT 10   ANIONGAP 9       Magnesium:   Recent Labs   Lab 03/27/23  0442   MG 1.9         Significant Imaging: I have reviewed all pertinent imaging results/findings within the past 24 hours.      Assessment/Plan:      * Spinal cord compression  POD 1 s/p 1. Partial C2 and T1 laminectomy, medial facetectomy   2. Total C3, C4, C5, C6, C7 laminectomy, medial facetectomy  3. Posterior instrumentation bilateral C3-T2 using stereotactic navigation  4. Posterolateral arthrodesis C3-4, C4-5, C5-6, C6-7, C7-T1, T1-T2  5. Harvesting local autograft  6. Implantation of cell based allograft  7. Intraoperative neuro monitoring with Dr. Kc  Continue to follow recommendations .  Follow hemoglobin and hematocrit closely.  Pain control   Physical therapy as per neurosurgery instruction with fall precautions.          Multiple skin tears  Present on admission s/p fall  Wound care      Closed nondisplaced fracture of sixth cervical vertebra  MRI brain/cervical spine/lumbar spine-reviewed  Consult neurosurgery-procedure as described above.      Pneumonia  IV rocephin-completed 3 day course azithromycin  brochodilators prn  Supplemental oxygen as needed   Check procalcitonin-WNL  Continue IS post operatively  Repeat CXR clear      Warfarin anticoagulation  On hold 2/2 surgery.  Resume when OK with surgeon/cardiology.    Chronic systolic congestive heart failure  Patient is identified as having Systolic (HFrEF) heart failure that is Chronic. CHF is currently controlled. Latest ECHO performed and demonstrates- Results for orders placed during the  hospital encounter of 10/22/21    Echo    Interpretation Summary  · The left ventricle is mildly enlarged with mild concentric hypertrophy and mildly decreased systolic function.  · The estimated ejection fraction is 40%.  · Grade I left ventricular diastolic dysfunction.  · There are segmental left ventricular wall motion abnormalities. There is mid-inferior dyskinesis  · Mild tricuspid regurgitation.  · Severe left atrial enlargement.  · Mild right ventricular enlargement with normal right ventricular systolic function.  . Continue Beta Blocker and monitor clinical status closely. Monitor on telemetry. Patient is off CHF pathway.  Monitor strict Is&Os and daily weights.  Continue to stress to patient importance of self efficacy and  on diet for CHF. Last BNP reviewed- and noted below No results for input(s): BNP, BNPTRIAGEBLO in the last 168 hours..      Anemia due to stage 3 chronic kidney disease  Patient's anemia is currently controlled. S/p 2 units of PRBCs.  Current CBC reviewed-   Lab Results   Component Value Date    HGB 9.1 (L) 03/25/2023    HCT 26.5 (L) 03/25/2023    MCV 95 03/25/2023     03/25/2023     Monitor serial CBC and transfuse if patient becomes hemodynamically unstable, symptomatic or H/H drops below 7/21.         Generalized weakness  Etiology unclear.  Check CPK-mildly elevated.  Fluids given overnight-trend; now WNL.     PT/OT consult  Maintain fall precautions  Consult neurology/neurosurgery        Stasis dermatitis of both legs  chronic      Paroxysmal atrial fibrillation  Patient with Long standing persistent (>12 months) atrial fibrillation which is controlled currently with Beta Blocker. Patient is currently in atrial fibrillation.[id number] Score: 4.  Anticoagulation indicated. Anticoagulation done with warfarin chronically on hold 2/2 surgery.  Per neurosurgery (Dr. Worrell covering for Dr. Kc)-generally try to hold anticoagulation at least 5 days.  Will follow up with  Neurosurgery.        CKD (chronic kidney disease) stage 3, GFR 30-59 ml/min, 41  Creatine stable for now. BMP reviewed- noted Estimated Creatinine Clearance: 65.6 mL/min (based on SCr of 1.2 mg/dL). according to latest data. Monitor UOP and serial BMP and adjust therapy as needed. Renally dose meds.          GERD (gastroesophageal reflux disease)  Chronic; continue home pepcid      CAD (coronary artery disease)  Cardiology consulted for cardiac clearance-stress test completed and cleared for surgery.      The ECG portion of the study is negative for ischemia.    There were no arrhythmias during stress.    The nuclear portion of this study will be reported separately.    1. No confluent evidence of pharmacologically induced reversible ischemia.  2. Normal left ventricular wall motion.  3. Calculated ejection fraction of 53%    Continue to monitor telemetry postoperatively           Hypertension associated with diabetes  Chronic, controlled.  Latest blood pressure and vitals reviewed-   Temp:  [96.6 °F (35.9 °C)-98.1 °F (36.7 °C)]   Pulse:  []   Resp:  [6-34]   BP: (120-199)/(72-99)   SpO2:  [94 %-100 %]   Arterial Line BP: (162-172)/(76-84) .   Home meds for hypertension were reviewed and noted below.   Hypertension Medications             carvediloL (COREG) 25 MG tablet Take 1 tablet (25 mg total) by mouth 2 (two) times daily with meals.    lisinopriL (PRINIVIL,ZESTRIL) 40 MG tablet Take 1 tablet (40 mg total) by mouth every evening.    nitroGLYCERIN 0.4 MG/DOSE TL SPRY (NITROLINGUAL) 400 mcg/spray spray PLACE 1 SPRAY UNDER THE TONGUE EVERY 5 MINUTES AS NEEDED FOR CHEST PAIN          While in the hospital, will manage blood pressure as follows; Adjust home antihypertensive regimen as follows- continue chronic beta blocker; will hold ACE for now due to recent EMMY.  Monitor creatinine and resume lisinopril when proven stable.-resume nightly lisinopril and continue to monitor.    Will utilize p.r.n. blood  pressure medication only if patient's blood pressure greater than  180/110 and he develops symptoms such as worsening chest pain or shortness of breath.      MTHFR mutation (methylenetetrahydrofolate reductase)  Consult hematology for recommendations regarding anticoagulation-daughter prefers to avoid lovenox-hematology states they do not manage-will defer to cardiology.  Placed on IV heparin drip-warfarin stopped  Heparin stopped prior to surgery  Will resume anticoagulation once cleared by Neurosurgery per Cardiology recommendations      Diabetes mellitus with chronic kidney disease, stage III  Not on chronic meds since bariatric surgery.  Monitor daily glucose-has been controlled.      VTE Risk Mitigation (From admission, onward)         Ordered     Place sequential compression device  Until discontinued         03/24/23 0621     Place sequential compression device  Until discontinued         03/20/23 1929     Reason for No Pharmacological VTE Prophylaxis  Once        Question:  Reasons:  Answer:  Already adequately anticoagulated on oral Anticoagulants    03/20/23 1929                Discharge Planning   ORACIO: 3/28/2023     Code Status: Prior   Is the patient medically ready for discharge?:     Reason for patient still in hospital (select all that apply): Patient trending condition, Treatment and Consult recommendations  Discharge Plan A: Skilled Nursing Facility                  Crystal Liu NP  Department of Hospital Medicine   [facility]

## 2023-03-28 VITALS
DIASTOLIC BLOOD PRESSURE: 82 MMHG | SYSTOLIC BLOOD PRESSURE: 154 MMHG | HEART RATE: 72 BPM | OXYGEN SATURATION: 96 % | TEMPERATURE: 98 F | RESPIRATION RATE: 17 BRPM | HEIGHT: 69 IN | WEIGHT: 246.94 LBS | BODY MASS INDEX: 36.57 KG/M2

## 2023-03-28 LAB
ALBUMIN SERPL BCP-MCNC: 2.1 G/DL (ref 3.5–5.2)
ALP SERPL-CCNC: 82 U/L (ref 55–135)
ALT SERPL W/O P-5'-P-CCNC: 12 U/L (ref 10–44)
ANION GAP SERPL CALC-SCNC: 9 MMOL/L (ref 8–16)
AST SERPL-CCNC: 33 U/L (ref 10–40)
BILIRUB SERPL-MCNC: 0.6 MG/DL (ref 0.1–1)
BUN SERPL-MCNC: 27 MG/DL (ref 8–23)
CALCIUM SERPL-MCNC: 9.4 MG/DL (ref 8.7–10.5)
CHLORIDE SERPL-SCNC: 103 MMOL/L (ref 95–110)
CO2 SERPL-SCNC: 21 MMOL/L (ref 23–29)
CREAT SERPL-MCNC: 1.2 MG/DL (ref 0.5–1.4)
EST. GFR  (NO RACE VARIABLE): >60 ML/MIN/1.73 M^2
GLUCOSE SERPL-MCNC: 139 MG/DL (ref 70–110)
INR PPP: 1.1 (ref 0.8–1.2)
MAGNESIUM SERPL-MCNC: 1.9 MG/DL (ref 1.6–2.6)
POTASSIUM SERPL-SCNC: 3.9 MMOL/L (ref 3.5–5.1)
PROT SERPL-MCNC: 5.5 G/DL (ref 6–8.4)
PROTHROMBIN TIME: 12.1 SEC (ref 9–12.5)
SODIUM SERPL-SCNC: 133 MMOL/L (ref 136–145)
TROPONIN I SERPL DL<=0.01 NG/ML-MCNC: 0.21 NG/ML (ref 0–0.03)
TROPONIN I SERPL DL<=0.01 NG/ML-MCNC: 0.23 NG/ML (ref 0–0.03)

## 2023-03-28 PROCEDURE — 93010 EKG 12-LEAD: ICD-10-PCS | Mod: ,,, | Performed by: INTERNAL MEDICINE

## 2023-03-28 PROCEDURE — 93005 ELECTROCARDIOGRAM TRACING: CPT

## 2023-03-28 PROCEDURE — 85610 PROTHROMBIN TIME: CPT | Performed by: NURSE PRACTITIONER

## 2023-03-28 PROCEDURE — 99900035 HC TECH TIME PER 15 MIN (STAT)

## 2023-03-28 PROCEDURE — 25000003 PHARM REV CODE 250: Performed by: NURSE PRACTITIONER

## 2023-03-28 PROCEDURE — 93010 ELECTROCARDIOGRAM REPORT: CPT | Mod: ,,, | Performed by: INTERNAL MEDICINE

## 2023-03-28 PROCEDURE — 97110 THERAPEUTIC EXERCISES: CPT

## 2023-03-28 PROCEDURE — 93010 ELECTROCARDIOGRAM REPORT: CPT | Mod: 76,,, | Performed by: INTERNAL MEDICINE

## 2023-03-28 PROCEDURE — 84484 ASSAY OF TROPONIN QUANT: CPT | Mod: 91 | Performed by: NURSE PRACTITIONER

## 2023-03-28 PROCEDURE — 83735 ASSAY OF MAGNESIUM: CPT | Performed by: INTERNAL MEDICINE

## 2023-03-28 PROCEDURE — 99233 PR SUBSEQUENT HOSPITAL CARE,LEVL III: ICD-10-PCS | Mod: ,,, | Performed by: INTERNAL MEDICINE

## 2023-03-28 PROCEDURE — 94799 UNLISTED PULMONARY SVC/PX: CPT

## 2023-03-28 PROCEDURE — 99233 SBSQ HOSP IP/OBS HIGH 50: CPT | Mod: ,,, | Performed by: INTERNAL MEDICINE

## 2023-03-28 PROCEDURE — A4216 STERILE WATER/SALINE, 10 ML: HCPCS | Performed by: NURSE PRACTITIONER

## 2023-03-28 PROCEDURE — 36415 COLL VENOUS BLD VENIPUNCTURE: CPT | Performed by: NURSE PRACTITIONER

## 2023-03-28 PROCEDURE — 63600175 PHARM REV CODE 636 W HCPCS: Performed by: NURSE PRACTITIONER

## 2023-03-28 PROCEDURE — 80053 COMPREHEN METABOLIC PANEL: CPT | Performed by: INTERNAL MEDICINE

## 2023-03-28 PROCEDURE — 25000003 PHARM REV CODE 250: Performed by: NEUROLOGICAL SURGERY

## 2023-03-28 PROCEDURE — 94761 N-INVAS EAR/PLS OXIMETRY MLT: CPT

## 2023-03-28 RX ORDER — WARFARIN SODIUM 5 MG/1
5 TABLET ORAL DAILY
Status: COMPLETED | OUTPATIENT
Start: 2023-03-28 | End: 2023-03-28

## 2023-03-28 RX ORDER — AMIODARONE HYDROCHLORIDE 200 MG/1
200 TABLET ORAL 2 TIMES DAILY
Qty: 60 TABLET | Refills: 11
Start: 2023-03-28 | End: 2024-03-27

## 2023-03-28 RX ORDER — WARFARIN 2.5 MG/1
2.5 TABLET ORAL DAILY
Status: DISCONTINUED | OUTPATIENT
Start: 2023-03-29 | End: 2023-03-28 | Stop reason: HOSPADM

## 2023-03-28 RX ORDER — HYDROCODONE BITARTRATE AND ACETAMINOPHEN 5; 325 MG/1; MG/1
1 TABLET ORAL EVERY 8 HOURS PRN
Qty: 4 TABLET | Refills: 0 | Status: ON HOLD | OUTPATIENT
Start: 2023-03-28 | End: 2023-04-03 | Stop reason: HOSPADM

## 2023-03-28 RX ORDER — LIDOCAINE 50 MG/G
1 PATCH TOPICAL DAILY
Qty: 15 PATCH | Refills: 0 | Status: ON HOLD | OUTPATIENT
Start: 2023-03-28 | End: 2023-04-03 | Stop reason: HOSPADM

## 2023-03-28 RX ORDER — ENOXAPARIN SODIUM 100 MG/ML
80 INJECTION SUBCUTANEOUS
Status: DISCONTINUED | OUTPATIENT
Start: 2023-03-28 | End: 2023-03-28

## 2023-03-28 RX ORDER — WARFARIN SODIUM 5 MG/1
5 TABLET ORAL DAILY
Status: DISCONTINUED | OUTPATIENT
Start: 2023-03-28 | End: 2023-03-28

## 2023-03-28 RX ORDER — ENOXAPARIN SODIUM 100 MG/ML
80 INJECTION SUBCUTANEOUS EVERY 12 HOURS
Status: CANCELLED
Start: 2023-03-28

## 2023-03-28 RX ADMIN — SODIUM CHLORIDE, PRESERVATIVE FREE 10 ML: 5 INJECTION INTRAVENOUS at 06:03

## 2023-03-28 RX ADMIN — CARVEDILOL 25 MG: 25 TABLET, FILM COATED ORAL at 04:03

## 2023-03-28 RX ADMIN — FERROUS SULFATE TAB 325 MG (65 MG ELEMENTAL FE) 1 EACH: 325 (65 FE) TAB at 08:03

## 2023-03-28 RX ADMIN — HYDROCODONE BITARTRATE AND ACETAMINOPHEN 1 TABLET: 5; 325 TABLET ORAL at 04:03

## 2023-03-28 RX ADMIN — MUPIROCIN: 20 OINTMENT TOPICAL at 08:03

## 2023-03-28 RX ADMIN — ENOXAPARIN SODIUM 80 MG: 80 INJECTION SUBCUTANEOUS at 09:03

## 2023-03-28 RX ADMIN — CARVEDILOL 25 MG: 25 TABLET, FILM COATED ORAL at 08:03

## 2023-03-28 RX ADMIN — AMIODARONE HYDROCHLORIDE 200 MG: 200 TABLET ORAL at 08:03

## 2023-03-28 RX ADMIN — SODIUM CHLORIDE, PRESERVATIVE FREE 10 ML: 5 INJECTION INTRAVENOUS at 04:03

## 2023-03-28 RX ADMIN — FAMOTIDINE 40 MG: 20 TABLET ORAL at 08:03

## 2023-03-28 RX ADMIN — WARFARIN SODIUM 5 MG: 5 TABLET ORAL at 04:03

## 2023-03-28 RX ADMIN — SODIUM CHLORIDE, PRESERVATIVE FREE 10 ML: 5 INJECTION INTRAVENOUS at 01:03

## 2023-03-28 RX ADMIN — CALCITRIOL CAPSULES 0.25 MCG 0.75 MCG: 0.25 CAPSULE ORAL at 08:03

## 2023-03-28 NOTE — PLAN OF CARE
DC orders sent to [facility] for review  Notified NP of needed hard scripts for home meds if to be continued at NH       03/28/23 2970   Post-Acute Status   Post-Acute Authorization Placement   Post-Acute Placement Status Pending post-acute provider review/more information requested

## 2023-03-28 NOTE — NURSING
Spoke to Dr. ZHENG Castle and discussed pts trop and discharge disposition. Dr. ROSAURA Castle states pt is clear to d/c as long as no new chest pain is noted. Also informed Dr. ZHENG Castle of pt family refusal to bridge Pt/INR with lovenox. Dr. ROSAURA Castle said to make sure refusal is documented and to proceed with d/c to rehab. Pt is clear to d/c per Cardiology. Crystal NP notified.

## 2023-03-28 NOTE — PLAN OF CARE
Copy of hard scripts sent to [facility] via [employer]       03/28/23 4444   Post-Acute Status   Post-Acute Authorization Placement   Post-Acute Placement Status Pending post-acute provider review/more information requested

## 2023-03-28 NOTE — PLAN OF CARE
Per Sita with [facility], report can be called to [phone]; patient is going to room a 26. [facility] to provide transportation after report has been called        PATIENT NEEDS CARDIOLOGY CLEARANCE PRIOR TO DC TO SNF       03/28/23 2729   Post-Acute Status   Post-Acute Authorization Placement   Post-Acute Placement Status Set-up Complete/Auth obtained

## 2023-03-28 NOTE — PROGRESS NOTES
[facility]  Department of Cardiology  Progress Note    PATIENT NAME: Richard Bustos  MRN: [id number]  TODAY'S DATE: 03/28/2023  ADMIT DATE: 3/20/2023    SUBJECTIVE     PRINCIPLE PROBLEM: Spinal cord compression    INTERVAL HISTORY:    3/28/2023  Patient is awake alert lying in bed not any acute distress.    His breathing is good denies any shortness of breath or difficulty in breathing.    Denies any chest pain or tightness or heaviness.  He does have neck pain and pain from the surgery.    Review of patient's allergies indicates:   Allergen Reactions    Donepezil Other (See Comments)     AMS    Bactroban [mupirocin calcium] Blisters     Causes Blisters    Shellfish containing products Other (See Comments)     Other reaction(s): Gout  OYSTERS       REVIEW OF SYSTEMS  CARDIOVASCULAR: No recent chest pain, palpitations, arm, neck, or jaw pain  RESPIRATORY: No recent fever, cough chills, SOB or congestion  : No blood in the urine  GI: No Nausea, vomiting, constipation, diarrhea, blood, or reflux.  MUSCULOSKELETAL:  Significant musculoskeletal pain due to surgery.  NEURO: No lightheadedness or dizziness  EYES: No Double vision, blurry, vision or headache     OBJECTIVE     VITAL SIGNS (Most Recent)  Temp: 97.2 °F (36.2 °C) (03/28/23 0726)  Pulse: 73 (03/28/23 0726)  Resp: 15 (03/28/23 0726)  BP: (!) 176/86 (03/28/23 0824)  SpO2: 96 % (03/28/23 0726)    VENTILATION STATUS  Resp: 15 (03/28/23 0726)  SpO2: 96 % (03/28/23 0726)       I & O (Last 24H):  Intake/Output Summary (Last 24 hours) at 3/28/2023 1049  Last data filed at 3/28/2023 0637  Gross per 24 hour   Intake 100 ml   Output --   Net 100 ml       WEIGHTS  Wt Readings from Last 1 Encounters:   03/26/23 0327 112 kg (246 lb 14.6 oz)   03/24/23 0647 111.1 kg (245 lb)   03/22/23 1336 111.1 kg (245 lb)   03/20/23 2013 111.2 kg (245 lb 2.4 oz)   03/20/23 1927 111.4 kg (245 lb 9.5 oz)   03/20/23 1342 113.4 kg (250 lb)       PHYSICAL EXAM  CONSTITUTIONAL:  Well built, well nourished in no apparent distress  NECK: no carotid bruit,  ecchymosis on the face and neck.  LUNGS:  Decreased breath sounds at the base  CHEST WALL: no tenderness  HEART: regular rate and rhythm, S1, S2 normal, systolic murmur audible.  ABDOMEN: soft,   EXTREMITIES: Extremities normal, no edema  NEURO: AAO X 3    SCHEDULED MEDS:   allopurinoL  100 mg Oral QHS    amiodarone  200 mg Oral BID    calcitRIOL  0.75 mcg Oral Daily    carvediloL  25 mg Oral BID WM    enoxaparin  80 mg Subcutaneous Q12H    famotidine  40 mg Oral Daily    ferrous sulfate  1 tablet Oral BID    lisinopriL  40 mg Oral QHS    magnesium oxide  400 mg Oral QHS    mupirocin   Nasal BID    sodium chloride 0.9%  10 mL Intravenous Q6H    traZODone  50 mg Oral QHS    [START ON 3/29/2023] warfarin  2.5 mg Oral Daily    warfarin  5 mg Oral Daily       CONTINUOUS INFUSIONS:    PRN MEDS:sodium chloride, acetaminophen, albuterol sulfate, aluminum-magnesium hydroxide-simethicone, dextrose 10%, dextrose 10%, dextrose, dextrose, fluticasone propionate, glucagon (human recombinant), HYDROcodone-acetaminophen, HYDROmorphone, ipratropium, melatonin, naloxone, ondansetron, ondansetron, oxyCODONE, oxyCODONE-acetaminophen, prochlorperazine, senna-docusate 8.6-50 mg, simethicone, sodium chloride 0.9%, Flushing PICC Protocol **AND** sodium chloride 0.9% **AND** sodium chloride 0.9%    LABS AND DIAGNOSTICS     CBC LAST 3 DAYS  Recent Labs   Lab 03/24/23  1608 03/25/23  0456 03/27/23  0442   WBC 7.69 9.95 8.69   RBC 2.85* 2.78* 2.56*   HGB 9.4* 9.1* 8.5*   HCT 27.6* 26.5* 25.1*   MCV 97 95 98   MCH 33.0* 32.7* 33.2*   MCHC 34.1 34.3 33.9   RDW 16.6* 16.2* 15.9*    220 185   MPV 10.5 10.5 9.9   GRAN 91.7*  7.1 84.5*  8.4* 79.1*  6.9   LYMPH 5.3*  0.4* 7.8*  0.8* 11.5*  1.0   MONO 0.9*  0.1* 6.7  0.7 7.6  0.7   BASO 0.02 0.01 0.02   NRBC 0 0 0       COAGULATION LAST 3 DAYS  Recent Labs   Lab 03/22/23  1411 03/22/23  2222 03/23/23  0516  03/23/23  1609 03/24/23  0447 03/28/23  0851   INR 1.5*  --   --   --  1.1 1.1   APTT 31.1   < > 49.6* 39.9* 25.8  --     < > = values in this interval not displayed.       CHEMISTRY LAST 3 DAYS  Recent Labs   Lab 03/25/23  0456 03/27/23  0442 03/28/23  0912   * 134* 133*   K 4.6 4.0 3.9    103 103   CO2 23 22* 21*   ANIONGAP 10 9 9   BUN 26* 30* 27*   CREATININE 1.2 1.4 1.2   * 89 139*   CALCIUM 9.1 9.2 9.4   MG 2.0 1.9 1.9   ALBUMIN  --  2.2* 2.1*   PROT  --  5.1* 5.5*   ALKPHOS  --  78 82   ALT  --  10 12   AST  --  35 33   BILITOT  --  0.7 0.6       CARDIAC PROFILE LAST 3 DAYS  Recent Labs   Lab 03/22/23  0514 03/28/23  0912     --    TROPONINI  --  0.230*       ENDOCRINE LAST 3 DAYS  No results for input(s): TSH, PROCAL in the last 168 hours.    LAST ARTERIAL BLOOD GAS  ABG  No results for input(s): PH, PO2, PCO2, HCO3, BE in the last 168 hours.    LAST 7 DAYS MICROBIOLOGY   Microbiology Results (last 7 days)       ** No results found for the last 168 hours. **            MOST RECENT IMAGING  X-Ray Cervical Spine AP And Lateral  Narrative: EXAMINATION:  XR CERVICAL SPINE AP LATERAL    CLINICAL HISTORY:  Postop; Unspecified cord compression    TECHNIQUE:  AP, lateral and open mouth views of the cervical spine were performed.    COMPARISON:  None.    FINDINGS:  The patient has had placement of posterior fixation devices bilaterally extending from C3 down to T2.  Cutaneous staples remain in place.  Degenerative disc disease is noted at every level from C3 down to C6 with the lower levels not visible on the lateral film.  Impression: Postop posterior fixation from C3-T2 with hardware in place.  Degenerative disc disease visible from C3-C6.    Electronically signed by: Jonathan Álvarez MD  Date:    03/25/2023  Time:    10:13      [facility]  Results for orders placed during the hospital encounter of 03/20/23    Echo    Interpretation Summary  · The left ventricle is normal in size with mildly  decreased systolic function.  · Left ventricular diastolic dysfunction.  · The estimated ejection fraction is 45%.  · There is mild left ventricular global hypokinesis.  · Normal right ventricular size with normal right ventricular systolic function.  · The aortic root is mildly dilated.  · The ascending aorta is dilated.  · Trivial posterior pericardial effusion.  · Mild aortic regurgitation.  · Mild tricuspid regurgitation.  · Normal central venous pressure (3 mmHg).      CURRENT/PREVIOUS VISIT EKG  Results for orders placed or performed during the hospital encounter of 03/20/23   EKG 12-lead    Collection Time: 03/28/23  8:49 AM    Narrative    Test Reason : I48.0,    Vent. Rate : 083 BPM     Atrial Rate : 041 BPM     P-R Int : 000 ms          QRS Dur : 112 ms      QT Int : 452 ms       P-R-T Axes : 000 -14 -50 degrees     QTc Int : 531 ms    Atrial fibrillation with premature ventricular or aberrantly conducted  complexes  Low voltage QRS  ST elevation consider anterior injury or acute infarct  Prolonged QT    ACUTE MI / STEMI    Abnormal ECG  When compared with ECG of 22-MAR-2023 13:32,  Right bundle branch block is no longer Present    Referred By: [email or web address]   SELF           Confirmed By:        ASSESSMENT/PLAN:     Active Hospital Problems    Diagnosis    *Spinal cord compression    Multiple skin tears    Closed nondisplaced fracture of sixth cervical vertebra    Pneumonia    Warfarin anticoagulation    Chronic systolic congestive heart failure    Anemia due to stage 3 chronic kidney disease    Generalized weakness    Stasis dermatitis of both legs    Paroxysmal atrial fibrillation     EKG stable  Stable on Coreg, no missed doses of anticoagulation. Continue anticoagulation as described below      CKD (chronic kidney disease) stage 3, GFR 30-59 ml/min, 41    GERD (gastroesophageal reflux disease)    Diabetes mellitus with chronic kidney disease, stage III    Hypertension associated with diabetes    MTHFR  mutation (methylenetetrahydrofolate reductase)    CAD (coronary artery disease)       ASSESSMENT & PLAN:   1. Paroxysmal atrial fibrillation  2. Status post fall and neck injury status post multiple level laminectomy  3. Coronary artery disease  4. Chronic anticoagulation with warfarin  5. Chronic systolic congestive heart failure   6. Pneumonia        RECOMMENDATIONS:  1. Patient is back in sinus rhythm continue amiodarone 200 mg p.o. b.i.d..  2. Echo  : His echo shows LV ejection fraction of 40% with severe left atrial enlargement.  Mild RV dilatation.  3. Resume anticoagulation with bridging with Lovenox 80 mg subQ b.i.d.  Start Coumadin 5 mg loading dose and 2.5 mg p.o. q.day till the INR is 2.2 and above.  Resume anticoagulation only when okay with Neurosurgery.  4. EKG shows nonspecific ST T-wave changes with incomplete right bundle branch block no evidence of any acute MI.    5. Okay to proceed with the transfer to rehab facility.  Needs further cardiac follow-up and workup as outpatient.          Klaus Castle MD  Date of Service: 03/28/2023  10:49 AM

## 2023-03-28 NOTE — PLAN OF CARE
Spoke with Sita from [facility] they are ready to admit today if/when pt is medically clear.        03/28/23 1230   Post-Acute Status   Post-Acute Authorization Placement   Post-Acute Placement Status Pending medical clearance/testing

## 2023-03-28 NOTE — PLAN OF CARE
Back of [place] collar remains in place while patient is at rest, incontinent care provided, no complaints of pain, resting between care.   Problem: Adult Inpatient Plan of Care  Goal: Plan of Care Review  Outcome: Ongoing, Progressing     Problem: Adult Inpatient Plan of Care  Goal: Optimal Comfort and Wellbeing  Outcome: Ongoing, Progressing     Problem: Skin Injury Risk Increased  Goal: Skin Health and Integrity  Outcome: Ongoing, Progressing     Problem: Infection  Goal: Absence of Infection Signs and Symptoms  Outcome: Ongoing, Progressing     Problem: Fall Injury Risk  Goal: Absence of Fall and Fall-Related Injury  Outcome: Ongoing, Progressing

## 2023-03-28 NOTE — PT/OT/SLP PROGRESS
Physical Therapy      Patient Name:  Richard Bustos   MRN:  [id number]    Patient not seen today despite three attempts made.     09:06-therapist deferred due to enhanced cardiac monitoring.   13:36-pt declined due to need for toileting. 14:28-OT present for treatment; assisted OT in scooting pt up in bed and positioning posterior portion of cervical collar.     Will follow-up 3/29/23

## 2023-03-28 NOTE — PLAN OF CARE
Pt clear for DC from case management standpoint. Discharging to [facility]       PATIENT NEEDS CARDS CLEARANCE WITH LAST RESULTED TROPONIN AND PT/FAMILY REFUSING LOVENOX BRIDGE       03/28/23 1637   Final Note   Assessment Type Final Discharge Note   Anticipated Discharge Disposition SNF   Hospital Resources/Appts/Education Provided Appointments scheduled and added to AVS

## 2023-03-28 NOTE — DISCHARGE INSTRUCTIONS
[facility] Facility Transfer Orders        Admit to: [facility]    Diagnoses:   Active Hospital Problems    Diagnosis  POA    *Spinal cord compression [G95.20]  Yes    Multiple skin tears [T14.8XXA]  Yes    Closed nondisplaced fracture of sixth cervical vertebra [S12.501A]  Yes    Pneumonia [J18.9]  Yes    Warfarin anticoagulation [Z79.01]  Not Applicable    Chronic systolic congestive heart failure [I50.22]  Yes    Anemia due to stage 3 chronic kidney disease [N18.30, D63.1]  Yes    Generalized weakness [R53.1]  Yes    Stasis dermatitis of both legs [I87.2]  Yes    Paroxysmal atrial fibrillation [I48.0]  Yes     EKG stable  Stable on Coreg, no missed doses of anticoagulation. Continue anticoagulation as described below      CKD (chronic kidney disease) stage 3, GFR 30-59 ml/min, 41 [N18.30]  Yes    GERD (gastroesophageal reflux disease) [K21.9]  Yes    Diabetes mellitus with chronic kidney disease, stage III [E11.22]  Yes     Chronic    Hypertension associated with diabetes [E11.59, I15.2]  Yes    MTHFR mutation (methylenetetrahydrofolate reductase) [Z15.89]  Not Applicable    CAD (coronary artery disease) [I25.10]  Yes      Resolved Hospital Problems   No resolved problems to display.     Allergies:   Review of patient's allergies indicates:   Allergen Reactions    Donepezil Other (See Comments)     AMS    Bactroban [mupirocin calcium] Blisters     Causes Blisters    Shellfish containing products Other (See Comments)     Other reaction(s): Gout  OYSTERS       Code Status: Full    Vitals: Routine       Diet: diabetic diet: 1800 calorie    Activity: Activity as tolerated    Nursing Precautions: Fall    Bed/Surface: Low Air Loss    Consults: PT to evaluate and treat- 5 times a week, OT to evaluate and treat- 5 times a week, and Wound Care    Oxygen: room air    Dialysis: Patient is not on dialysis.     Labs: First blood draw on 3/31/2023.              CBL and BMP PT/INR   Pending Diagnostic  Studies:       Procedure Component Value Units Date/Time    EKG 12-lead [[id number]]     Order Status: Sent Lab Status: No result     EKG 12-lead [[id number]] Collected: 03/28/23 0849    Order Status: Sent Lab Status: In process Updated: 03/28/23 0902    Narrative:      Test Reason : I48.0,    Vent. Rate : 083 BPM     Atrial Rate : 041 BPM     P-R Int : 000 ms          QRS Dur : 112 ms      QT Int : 452 ms       P-R-T Axes : 000 -14 -50 degrees     QTc Int : 531 ms    Atrial fibrillation with premature ventricular or aberrantly conducted  complexes  Low voltage QRS  ST elevation consider anterior injury or acute infarct  Prolonged QT    ACUTE MI / STEMI    Abnormal ECG  When compared with ECG of 22-MAR-2023 13:32,  Right bundle branch block is no longer Present    Referred By: [email or web address]   SELF           Confirmed By:     Troponin I [[id number]]     Order Status: Sent Lab Status: No result     Specimen: Blood           Imaging: none    Miscellaneous Care:   Wound Care: yes:  Skin tears  Diabetes Care: Diabetes: Check blood sugar. Fingerstick blood sugar AC and HS  Sliding Scale/Hypoglycemia Protocol: For FSG<80, give 1 amp D50 or 1 glucose tablet. For FSG , do nothing. For -200, give 1 unit of novolog in addition to pre-meal insulin. For -250, give 2 units of novolog in addition to pre-meal insulin. For -300, give 3 units of novolog in addition to pre-meal insulin. For 301-350, give 4 units of novolog in addition to pre-meal insulin. For 351-400, give 5 units of novolog in addition to pre-meal insulin. For FSG >400, give 5 units of novolog in addition to pre-meal insulin and please call MD    IV Access: none     Medications: Discontinue all previous medication orders, if any. See new list below.  Current Discharge Medication List        START taking these medications    Details   amiodarone (PACERONE) 200 MG Tab Take 1 tablet (200 mg total) by mouth 2 (two) times daily.  Qty: 60 tablet,  Refills: 11           CONTINUE these medications which have NOT CHANGED    Details   aspirin (ECOTRIN) 81 MG EC tablet Take 81 mg by mouth once daily.      carvediloL (COREG) 25 MG tablet Take 1 tablet (25 mg total) by mouth 2 (two) times daily with meals.  Qty: 180 tablet, Refills: 3    Comments: .  Associated Diagnoses: Hypertensive left ventricular hypertrophy, without heart failure      allopurinoL (ZYLOPRIM) 100 MG tablet Take 1 tablet (100 mg total) by mouth every evening.  Qty: 90 tablet, Refills: 3    Associated Diagnoses: Acute gout of foot, unspecified cause, unspecified laterality      atorvastatin (LIPITOR) 80 MG tablet Take 1 tablet (80 mg total) by mouth once daily.  Qty: 90 tablet, Refills: 3    Associated Diagnoses: Mixed hyperlipidemia      calcitRIOL (ROCALTROL) 0.5 MCG Cap 0.75 mcg once daily.   Refills: 4      clopidogreL (PLAVIX) 75 mg tablet Take 1 tablet (75 mg total) by mouth once daily.  Qty: 90 tablet, Refills: 3    Associated Diagnoses: Coronary artery disease involving native coronary artery of native heart without angina pectoris      famotidine (PEPCID) 40 MG tablet Take 1 tablet (40 mg total) by mouth once daily.  Qty: 90 tablet, Refills: 3      ferrous sulfate (FEROSUL) 325 mg (65 mg iron) Tab tablet TAKE 1 TABLET BY MOUTH TWICE DAILY  Qty: 180 tablet, Refills: 3    Associated Diagnoses: Iron deficiency anemia      fluticasone propionate (FLONASE) 50 mcg/actuation nasal spray SHAKE LIQUID AND USE 2 SPRAYS(100 MCG) IN EACH NOSTRIL EVERY DAY  Qty: 16 g, Refills: 5    Associated Diagnoses: Chronic sinus complaints      FOLIC ACID/MULTIVIT-MIN/LUTEIN (CENTRUM SILVER ORAL) Take 1 tablet by mouth once daily.      HYDROcodone-acetaminophen (NORCO) 5-325 mg per tablet Take 1 tablet by mouth every 8 (eight) hours as needed for Pain.  Qty: 90 tablet, Refills: 0    Comments: Quantity prescribed more than 7 day supply? Yes, quantity medically necessary  Associated Diagnoses: Chronic pain disorder       LIDOcaine (LIDODERM) 5 % Place 1 patch onto the skin once daily. Remove & Discard patch within 12 hours or as directed by MD  Qty: 15 patch, Refills: 0      lisinopriL (PRINIVIL,ZESTRIL) 40 MG tablet Take 1 tablet (40 mg total) by mouth every evening.  Qty: 90 tablet, Refills: 3    Comments: .  Associated Diagnoses: Essential hypertension; Chronic systolic congestive heart failure; Stage 3b chronic kidney disease; LV dysfunction      magnesium oxide (MAG-OX) 400 mg (241.3 mg magnesium) tablet Take 1 tablet (400 mg total) by mouth once daily.  Qty: 90 tablet, Refills: 3    Associated Diagnoses: Essential hypertension      mecobal-levomefolat Ca-B6 phos (L-METHYL-B6-B12) 3-35-2 mg Tab Take 1 tablet by mouth 2 (two) times daily.  Qty: 180 tablet, Refills: 3    Associated Diagnoses: Iron deficiency anemia      mirabegron (MYRBETRIQ) 50 mg Tb24 Take 1 tablet (50 mg total) by mouth once daily.  Qty: 90 tablet, Refills: 3      nitroGLYCERIN 0.4 MG/DOSE TL SPRY (NITROLINGUAL) 400 mcg/spray spray PLACE 1 SPRAY UNDER THE TONGUE EVERY 5 MINUTES AS NEEDED FOR CHEST PAIN  Qty: 4.9 g, Refills: 9    Comments: **Patient requests 90 days supply**  Associated Diagnoses: Coronary artery disease, angina presence unspecified, unspecified vessel or lesion type, unspecified whether native or transplanted heart      nystatin (MYCOSTATIN) powder Apply topically 2 (two) times daily.  Qty: 60 g, Refills: 11    Associated Diagnoses: Stasis dermatitis of both legs      omeprazole (PRILOSEC) 20 MG capsule Take 1 capsule (20 mg total) by mouth once daily.  Qty: 90 capsule, Refills: 3      prothrombin time/INR test metr Misc 1 Stick by Misc.(Non-Drug; Combo Route) route every 30 days.  Qty: 1 each, Refills: 0    Associated Diagnoses: Paroxysmal atrial fibrillation      traZODone (DESYREL) 50 MG tablet Take 1 tablet (50 mg total) by mouth every evening.  Qty: 90 tablet, Refills: 3      vit A/vit C/vit E/zinc/copper (PRESERVISION AREDS ORAL)  Take by mouth 2 (two) times a day.       warfarin (COUMADIN) 5 MG tablet 5 mg except on wednesday and saturday Wed and sat 2.5 mgs  Qty: 90 tablet, Refills: 3    Associated Diagnoses: Paroxysmal atrial fibrillation; MTHFR mutation (methylenetetrahydrofolate reductase)           Follow up:       Immunizations Administered as of 3/28/2023       Name Date Dose VIS Date Route Exp Date    COVID-19, MRNA, LN-S, PF (Pfizer) (Purple Cap) 10/15/2021  2:40 PM 0.3 mL 12/12/2020 Intramuscular 1/31/2022    Site: Right deltoid     Given By: Estiven Reese MA     : Pf[employer]     Lot: [id number]     COVID-19, MRNA, LN-S, PF (Pfizer) (Purple Cap) 3/27/2021  8:34 AM 0.3 mL 12/12/2020 Intramuscular 6/30/2021    Site: Left deltoid     Given By: Aydin Smart MA     : [employer]     Lot: [id number]     COVID-19, MRNA, LN-S, PF (Pfizer) (Purple Cap) 3/6/2021 12:11 PM 0.3 mL 12/12/2020 Intramuscular 3/6/2021    Site: Left deltoid     Given By: Emanuel Johnston     : [employer]     Lot: [id number]             This patient has had both covid vaccinations    Some patients may experience side effects after vaccination.  These may include fever, headache, muscle or joint aches.  Most symptoms resolve with 24-48 hours and do not require urgent medical evaluation unless they persist for more than 72 hours or symptoms are concerning for an unrelated medical condition.          Crystal Liu NP

## 2023-03-28 NOTE — PLAN OF CARE
Problem: Occupational Therapy  Goal: Occupational Therapy Goal  Description: Goals to be met by: 4/22/23     Patient will increase functional independence with ADLs by performing:    Feeding with Set-up Assistance.  UE Dressing with Minimal Assistance.  LE Dressing with Moderate Assistance.  Grooming while seated with Minimal Assistance.  Toileting from bedside commode with Maximum Assistance for hygiene and clothing management.   Bathing from  shower chair/bench with Maximum Assistance.  Toilet transfer to bedside commode with Maximum Assistance.  Increased strength and functional activity tolerance to WFL's for ADL's/IADL's.    Outcome: Ongoing, Progressing  Continue with POC

## 2023-03-28 NOTE — PT/OT/SLP PROGRESS
Occupational Therapy   Treatment    Name: Richard Bustos  MRN: [id number]  Admitting Diagnosis:  Spinal cord compression  4 Days Post-Op    Recommendations:     Discharge Recommendations: nursing facility, skilled, rehabilitation facility  Discharge Equipment Recommendations:  other (see comments) (TBD)  Barriers to discharge:       Assessment:     Richard Bustos is a 77 y.o. male with a medical diagnosis of Spinal cord compression.  He presents with the following performance deficits affecting function are weakness, impaired endurance, impaired self care skills, impaired functional mobility, gait instability, impaired balance, impaired cognition, decreased upper extremity function, decreased lower extremity function, impaired coordination, impaired fine motor, impaired skin, edema, orthopedic precautions. Pt was agreeable to OT. PTA assisted OT with repositioning and application of posterior of Aspen Collar with support of bed/pillow in addition. OT provided instruction in AAROM R & LUE, resistive  exercises with head/neck supported. Pt required repeated cues and demonstration to complete. Pt remains confused. Pt demonstrates little to no L shoulder flexion, L elbow flexion, L wrist extension, and L forearm supination. OT provided education in calling for assist. Pt verbalized understanding.    Rehab Prognosis:  Good; patient would benefit from acute skilled OT services to address these deficits and reach maximum level of function.       Plan:     Patient to be seen 5 x/week to address the above listed problems via self-care/home management, therapeutic activities, therapeutic exercises  Plan of Care Expires: 04/22/23  Plan of Care Reviewed with: patient    Subjective     Chief Complaint: Weakness  Patient/Family Comments/goals: To get better  Pain/Comfort:  Pain Rating 1: 0/10  Pain Rating Post-Intervention 1: 0/10    Objective:     Communicated with: Nurse Jennifer prior to session.  Patient found HOB elevated with  bed alarm, PureWick, peripheral IV, telemetry upon OT entry to room.    General Precautions: Standard, fall    Orthopedic Precautions:spinal precautions  Braces:  (Back of Aspen Collar applied with head/neck supported by pillow/bed. Per Dr. Kc's note, OK to have front of collar off and reapply for mobility.)  Respiratory Status: Room air     Occupational Performance:     Bed Mobility:    Patient completed Scooting/Bridging with total assistance and 2 persons       [facility] 6 Click ADL:      Treatment & Education:  OT provided review of role of OT. Pt verbalized understanding and was agreeable to OT.  OT provided instruction in AAROM R & LUE and resistive  exercises. Pt required assistance and repeated cues/demonstration to complete.  OT provided education in calling for assist. Pt verbalized understanding. Further training indicated secondary to confusion.    Patient left HOB elevated with all lines intact, call button in reach, bed alarm on, and Nurse Allision notified    GOALS:   Multidisciplinary Problems       Occupational Therapy Goals          Problem: Occupational Therapy    Goal Priority Disciplines Outcome Interventions   Occupational Therapy Goal     OT, PT/OT Ongoing, Progressing    Description: Goals to be met by: 4/22/23     Patient will increase functional independence with ADLs by performing:    Feeding with Set-up Assistance.  UE Dressing with Minimal Assistance.  LE Dressing with Moderate Assistance.  Grooming while seated with Minimal Assistance.  Toileting from bedside commode with Maximum Assistance for hygiene and clothing management.   Bathing from  shower chair/bench with Maximum Assistance.  Toilet transfer to bedside commode with Maximum Assistance.  Increased strength and functional activity tolerance to WFL's for ADL's/IADL's.                         Time Tracking:     OT Date of Treatment: 03/28/23  OT Start Time: 1428  OT Stop Time: 1448  OT Total Time (min): 20 min    Billable  Minutes:Therapeutic Exercise 20    OT/MARZENA: OT     Number of MARZENA visits since last OT visit: 1    3/28/2023

## 2023-03-28 NOTE — PLAN OF CARE
03/27/23 2047   Patient Assessment/Suction   Level of Consciousness (AVPU) alert   Respiratory Effort Normal;Unlabored   Expansion/Accessory Muscles/Retractions expansion symmetric   All Lung Fields Breath Sounds diminished   Rhythm/Pattern, Respiratory pattern regular   Cough Frequency no cough   PRE-TX-O2   Device (Oxygen Therapy) room air   SpO2 (!) 93 %   Pulse Oximetry Type Intermittent   Aerosol Therapy   $ Aerosol Therapy Charges PRN treatment not required   Incentive Spirometer   $ Incentive Spirometer Charges done with encouragement   Administration (IS) instruction provided, follow-up   Number of Repetitions (IS) 10   Level Incentive Spirometer (mL) 1250   Patient Tolerance (IS) no adverse signs/symptoms present

## 2023-03-29 NOTE — DISCHARGE SUMMARY
[facility] Hospital Medicine  Discharge Summary      Patient Name: Richard Bustos  MRN: [id number]  JUNIOR: [id number]  Patient Class: IP- Inpatient  Admission Date: 3/20/2023  Hospital Length of Stay: 6 days  Discharge Date and Time: 3/28/2023  6:15 PM  Attending Physician: No att. providers found   Discharging Provider: Crystal Liu NP  Primary Care Provider: Familia Ramirez Jr, MD    Primary Care Team: Networked reference to record PCT     HPI:   Richard Bustos is a 78 y/o M with a past medical history significant for anemia, CAD, CHF, CKD, atrial fibrillation on warfarin, DM2, HLD, HTN, and GERD who presented to the ED for further evaluation of BLE weakness which began following a fall 3 days ago.  Pt reports that he was walking down a ramp at home when he slipped due to the ramp being wet.  He fell to his knees, but continued the fall onto his face and head and had positive loss of consciousness.  He was initially seen at [facility] ED where he was transferred to [facility].  He reports an extensive workup with no fractures seen and was ultimately discharged home.  He reports difficulty bearing weight since the fall and feels substantially weaker than his baseline.  He reports that he normally uses a cane for ambulation, but has not been able to ambulate at all since his fall 3 days ago.  Today in the ED, CT head is significant for frontal scalp hematoma with no acute intracranial injury seen; bilateral nasal bone fractures are noted.  CXR is concerning for pneumonia.  He will be placed in observation under the service of hospital medicine for continued monitoring and treatment.      Procedure(s) (LRB):  FUSION, SPINE, CERVICAL (N/A)      Hospital Course:   Richard Bustos was monitored closely during his hospitalization.  He was placed on IV rocephin and IV azithromycin in treatment of pneumonia.  An xray of his right shoulder was performed due to continued c/o pain and decreased ROM following his fall  with no fracture or dislocation seen.  PT/OT were consulted.  An MRI of the brain was obtained with no acute findings.  MRI cervical spine concerning for C6 fractures, neural foraminal and spinal canal stenosis, severe cansl stenosis cord compression C5-6 and mild to moderate cord flattening at C3-4, and C4-5, and other findings compatible with possible trauma as correlates with given history.  MRI lumbar spine showed multilevel degenerative changes, spinal canal stenosis and neural foraminal stenosis.  It was the recommendation of the neurosurgeon that pt undergo posterior cervical decompression/fusion C3-T1 with lumbar surgery to follow at a later date.  Due to the complexity of his health history, cardiology and hematology were consulted.  ASA and plavix were held. Coumadin was held and he was placed on a heparin drip in anticipation of surgery.  Cardiology recommended nuclear med stress test.  He was transferred to [facility] for come and go procedure-NM stress test with no reversible ischemia noted, so he received cardiac clearance for surgery.  On 3/24/23, Dr. Kc performed laminectomy, medial facetectomy of C2-T1.  Postoperatively, he continued to work with PT/OT.  Hard cervical collar was kept in place.  Pain was controlled with IV and oral narcotics.  He experienced some bouts of confusion, but was easily reoriented.  He was cleared from Neurology standpoint to resume anticoagulation.  Case was discussed with Cardiology who recommended Lovenox bridging and to restart his Coumadin.  Patient's family refused Lovenox despite risks versus benefits of bridge dosing and have understanding that Coumadin will take longer to reach therapeutic anticoagulation levels.  Patient was cleared from all specialties for skilled nursing facility.  He was accepted and transferred to [facility] skilled nursing [facility].    Physical exam:  Awake, alert, appropriate. No acute distress  Cardiac:  RRR  Pulmonary:  Clear to  auscultation  Abd: soft, non-tender               Goals of Care Treatment Preferences:  Code Status: Full Code    Living Will: Yes              Consults:   Consults (From admission, onward)        Status Ordering Provider     Inpatient consult to Cardiology  Once        Provider:  Darius Chicas MD    Completed KUN RIBERA     Inpatient consult to Social Work/Case Management  Once        Provider:  (Not yet assigned)    KUN Maki     Inpatient consult to Neurology  Once        Provider:  Enmanuel Palma MD    Completed KUN RIBERA     Inpatient consult to Neurosurgery  Once        Provider:  DO Reggie Marcum BARBARA D.          No new Assessment & Plan notes have been filed under this hospital service since the last note was generated.  Service: Hospital Medicine    Final Active Diagnoses:    Diagnosis Date Noted POA    PRINCIPAL PROBLEM:  Spinal cord compression [G95.20] 03/22/2023 Yes    Multiple skin tears [T14.8XXA] 03/25/2023 Yes    Closed nondisplaced fracture of sixth cervical vertebra [S12.501A] 03/21/2023 Yes    Pneumonia [J18.9] 03/20/2023 Yes    Warfarin anticoagulation [Z79.01] 11/17/2020 Not Applicable    Chronic systolic congestive heart failure [I50.22] 10/19/2019 Yes    Anemia due to stage 3 chronic kidney disease [N18.30, D63.1] 09/13/2017 Yes    Generalized weakness [R53.1] 12/06/2016 Yes    Stasis dermatitis of both legs [I87.2] 01/22/2015 Yes    Paroxysmal atrial fibrillation [I48.0] 11/21/2014 Yes    CKD (chronic kidney disease) stage 3, GFR 30-59 ml/min, 41 [N18.30] 01/23/2013 Yes    GERD (gastroesophageal reflux disease) [K21.9] 12/16/2011 Yes    Diabetes mellitus with chronic kidney disease, stage III [E11.22] 12/16/2011 Yes     Chronic    Hypertension associated with diabetes [E11.59, I15.2] 12/16/2011 Yes    MTHFR mutation (methylenetetrahydrofolate reductase) [Z15.89] 12/16/2011 Not Applicable    CAD  (coronary artery disease) [I25.10] 12/16/2011 Yes      Problems Resolved During this Admission:       Discharged Condition: stable    Disposition: Skilled Nursing Facility    Follow Up:    Patient Instructions:      Notify your health care provider if you experience any of the following:  temperature >100.4     Notify your health care provider if you experience any of the following:  persistent nausea and vomiting or diarrhea     Notify your health care provider if you experience any of the following:  severe uncontrolled pain     Notify your health care provider if you experience any of the following:  redness, tenderness, or signs of infection (pain, swelling, redness, odor or green/yellow discharge around incision site)     Notify your health care provider if you experience any of the following:  difficulty breathing or increased cough     Notify your health care provider if you experience any of the following:  increased confusion or weakness     Notify your health care provider if you experience any of the following:  persistent dizziness, light-headedness, or visual disturbances     Activity as tolerated       Significant Diagnostic Studies: Labs:   CMP   Recent Labs   Lab 03/28/23  0912   *   K 3.9      CO2 21*   *   BUN 27*   CREATININE 1.2   CALCIUM 9.4   PROT 5.5*   ALBUMIN 2.1*   BILITOT 0.6   ALKPHOS 82   AST 33   ALT 12   ANIONGAP 9   Troponin   Recent Labs   Lab 03/28/23  0912 03/28/23  1511   TROPONINI 0.230* 0.206*    and All labs within the past 24 hours have been reviewed  Radiology:     Procedure Component Value Units Date/Time   X-Ray Cervical Spine AP And Lateral [[id number]] Resulted: 03/25/23 1013   Order Status: Completed Updated: 03/25/23 1015   Narrative:     EXAMINATION:   XR CERVICAL SPINE AP LATERAL     CLINICAL HISTORY:   Postop; Unspecified cord compression     TECHNIQUE:   AP, lateral and open mouth views of the cervical spine were performed.     COMPARISON:   None.      FINDINGS:   The patient has had placement of posterior fixation devices bilaterally extending from C3 down to T2.  Cutaneous staples remain in place.  Degenerative disc disease is noted at every level from C3 down to C6 with the lower levels not visible on the lateral film.    Impression:       Postop posterior fixation from C3-T2 with hardware in place.  Degenerative disc disease visible from C3-C6.       Electronically signed by: Jonathan Álvarez MD   Date: 03/25/2023   Time: 10:13   SURG FL Surgery Fluoro Usage [[id number]] Resulted: 03/24/23 1412   Order Status: Completed Updated: 03/24/23 1412   Narrative:     See OP Notes for results.     IMPRESSION: See OP Notes for results.             This procedure was auto-finalized by: Virtual Radiologist   NM Myocardial Perfusion Spect Multi Pharmacologic [[id number]] Collected: 03/23/23 1038   Order Status: Completed Updated: 03/23/23 1345   Narrative:     HISTORY: CAD screening, high CAD risk, not treadmill candidate; CAD monitoring, abnormal prior < 2 yrs     TECHNIQUE:  12.1 mCi technetium 99m Myoview was administered IV for the rest portion of the exam, with 25.6 mCi for the stress portion of the exam, following a 1 day protocol.  The patient was stressed with Lexiscan 0.4 mg IV, and achieved a maximum heart rate of 103 beats per minute.     FINDINGS:  No prior studies for comparison. There is normal radiotracer uptake by the left ventricular myocardium, with no photopenic defects to suggest pharmacologically induced reversible ischemia.  There are no wall motion abnormalities on the gated cine images, with no abnormal transient left ventricular dilatation on stress images.     Ejection fraction is calculated at 53%.  The polar map images show reversibility in the lateral wall as well fixed photopenia in the inferolateral wall, however there is no convincing evidence of reversibility on planar SPECT images.     IMPRESSION:   1. No confluent evidence of  pharmacologically induced reversible ischemia.   2. Normal left ventricular wall motion.   3. Calculated ejection fraction of 53%.     Electronically signed by:  Germain Colón MD  3/23/2023 1:43 PM CDT Workstation: [id number]   X-Ray Chest 1 View for Line/Tube Placement [[id number]] Resulted: 03/22/23 1951   Order Status: Completed Updated: 03/22/23 1954   Narrative:     EXAMINATION:   XR CHEST 1 VIEW FOR LINE/TUBE PLACEMENT     CLINICAL HISTORY:   PICC insertion;     TECHNIQUE:   Single frontal portable view of the chest was performed.     COMPARISON:   03/20/2023.     FINDINGS:   There is a right upper extremity PICC which terminates in the high SVC.  The lungs are well expanded and clear.  No focal opacities are seen.  The pleural spaces are clear.  The cardiac silhouette is enlarged unchanged.  Visualized osseous structures are intact.    Impression:       Right-sided PICC as above.       Electronically signed by: Bandar Nuñez   Date: 03/22/2023   Time: 19:51         Pending Diagnostic Studies:     Procedure Component Value Units Date/Time    EKG 12-lead [[id number]] Collected: 03/28/23 0909    Order Status: Sent Lab Status: In process Updated: 03/29/23 0718    Narrative:      Test Reason : I49.9,    Vent. Rate : 069 BPM     Atrial Rate : 069 BPM     P-R Int : 184 ms          QRS Dur : 160 ms      QT Int : 448 ms       P-R-T Axes : 111 -14 256 degrees     QTc Int : 480 ms    Normal sinus rhythm  Right bundle branch block  T wave abnormality, consider inferolateral ischemia  Abnormal ECG  When compared with ECG of 28-MAR-2023 08:49,  Sinus rhythm has replaced Atrial fibrillation  Right bundle branch block is now Present    Referred By: [email or web address]   SELF           Confirmed By:     EKG 12-lead [[id number]] Collected: 03/28/23 0849    Order Status: Sent Lab Status: In process Updated: 03/28/23 0902    Narrative:      Test Reason : I48.0,    Vent. Rate : 083 BPM     Atrial Rate : 041 BPM     P-R Int : 000 ms           QRS Dur : 112 ms      QT Int : 452 ms       P-R-T Axes : 000 -14 -50 degrees     QTc Int : 531 ms    Atrial fibrillation with premature ventricular or aberrantly conducted  complexes  Low voltage QRS  ST elevation consider anterior injury or acute infarct  Prolonged QT    ACUTE MI / STEMI    Abnormal ECG  When compared with ECG of 22-MAR-2023 13:32,  Right bundle branch block is no longer Present    Referred By: AAAREFERR   SELF           Confirmed By:          Medications:  Reconciled Home Medications:      Medication List      START taking these medications    amiodarone 200 MG Tab  Commonly known as: PACERONE  Take 1 tablet (200 mg total) by mouth 2 (two) times daily.        CHANGE how you take these medications    warfarin 5 MG tablet  Commonly known as: COUMADIN  5 mg except on wednesday and saturday Wed and sat 2.5 mgs  What changed:   · how much to take  · how to take this  · when to take this        CONTINUE taking these medications    allopurinoL 100 MG tablet  Commonly known as: ZYLOPRIM  Take 1 tablet (100 mg total) by mouth every evening.     aspirin 81 MG EC tablet  Commonly known as: ECOTRIN  Take 81 mg by mouth once daily.     atorvastatin 80 MG tablet  Commonly known as: LIPITOR  Take 1 tablet (80 mg total) by mouth once daily.     calcitRIOL 0.5 MCG Cap  Commonly known as: ROCALTROL  0.75 mcg once daily.     carvediloL 25 MG tablet  Commonly known as: COREG  Take 1 tablet (25 mg total) by mouth 2 (two) times daily with meals.     CENTRUM SILVER ORAL  Take 1 tablet by mouth once daily.     clopidogreL 75 mg tablet  Commonly known as: PLAVIX  Take 1 tablet (75 mg total) by mouth once daily.     famotidine 40 MG tablet  Commonly known as: PEPCID  Take 1 tablet (40 mg total) by mouth once daily.     ferrous sulfate 325 mg (65 mg iron) Tab tablet  Commonly known as: FeroSuL  TAKE 1 TABLET BY MOUTH TWICE DAILY     fluticasone propionate 50 mcg/actuation nasal spray  Commonly known as: FLONASE  SHAKE  LIQUID AND USE 2 SPRAYS(100 MCG) IN EACH NOSTRIL EVERY DAY     HYDROcodone-acetaminophen 5-325 mg per tablet  Commonly known as: NORCO  Take 1 tablet by mouth every 8 (eight) hours as needed for Pain.     L-METHYL-B6-B12 3-35-2 mg Tab  Generic drug: mecobal-levomefolat Ca-B6 phos  Take 1 tablet by mouth 2 (two) times daily.     LIDOcaine 5 %  Commonly known as: LIDODERM  Place 1 patch onto the skin once daily. Remove & Discard patch within 12 hours or as directed by MD     lisinopriL 40 MG tablet  Commonly known as: PRINIVIL,ZESTRIL  Take 1 tablet (40 mg total) by mouth every evening.     magnesium oxide 400 mg (241.3 mg magnesium) tablet  Commonly known as: MAG-OX  Take 1 tablet (400 mg total) by mouth once daily.     MYRBETRIQ 50 mg Tb24  Generic drug: mirabegron  Take 1 tablet (50 mg total) by mouth once daily.     nitroGLYCERIN 0.4 MG/DOSE TL SPRY 400 mcg/spray spray  Commonly known as: NITROLINGUAL  PLACE 1 SPRAY UNDER THE TONGUE EVERY 5 MINUTES AS NEEDED FOR CHEST PAIN     nystatin powder  Commonly known as: MYCOSTATIN  Apply topically 2 (two) times daily.     omeprazole 20 MG capsule  Commonly known as: PRILOSEC  Take 1 capsule (20 mg total) by mouth once daily.     PRESERVISION AREDS ORAL  Take by mouth 2 (two) times a day.     prothrombin time/INR test metr Misc  1 Stick by Misc.(Non-Drug; Combo Route) route every 30 days.     traZODone 50 MG tablet  Commonly known as: DESYREL  Take 1 tablet (50 mg total) by mouth every evening.            Indwelling Lines/Drains at time of discharge:   Lines/Drains/Airways     None                 Time spent on the discharge of patient: 45 minutes         Crystal Liu NP  Department of Hospital Medicine  [facility]

## 2023-03-30 ENCOUNTER — TELEPHONE (OUTPATIENT)
Dept: NEUROSURGERY | Facility: CLINIC | Age: 78
End: 2023-03-30
Payer: MEDICARE

## 2023-03-30 NOTE — TELEPHONE ENCOUNTER
Spoke with therapist at [facility] and informed that pt may get out of bed with assistance as tolerated. Pt should wear cervical at all times. She voiced understanding.

## 2023-03-30 NOTE — TELEPHONE ENCOUNTER
----- Message from Allyn Velásquez RN sent at 3/29/2023  4:16 PM CDT -----  Contact: deirdre    ----- Message -----  From: Raissa Jhaveri  Sent: 3/29/2023   4:14 PM CDT  To: [facility] Staff    Type: Needs Medical Advice         Who Called: Physical Therapy [facility]    Best Call Back Number:[phone]    Additional Information: Requesting a call back regarding  They are asking you to call them back. They are needing to know a weight bearing status and if there are any spinal precautions. How  long is  pt to keep the Aspen collar on for        Please Advise- Thank you

## 2023-03-31 ENCOUNTER — HOSPITAL ENCOUNTER (OUTPATIENT)
Facility: HOSPITAL | Age: 78
Discharge: NURSING, REHAB OR LONG-TERM CARE | End: 2023-04-06
Attending: EMERGENCY MEDICINE | Admitting: HOSPITALIST
Payer: MEDICARE

## 2023-03-31 DIAGNOSIS — N18.32 STAGE 3B CHRONIC KIDNEY DISEASE: ICD-10-CM

## 2023-03-31 DIAGNOSIS — R41.82 ALTERED MENTAL STATUS, UNSPECIFIED ALTERED MENTAL STATUS TYPE: Primary | ICD-10-CM

## 2023-03-31 DIAGNOSIS — L97.522 ULCER OF TOE, LEFT, WITH FAT LAYER EXPOSED: ICD-10-CM

## 2023-03-31 DIAGNOSIS — I10 ESSENTIAL HYPERTENSION: ICD-10-CM

## 2023-03-31 DIAGNOSIS — E86.0 DEHYDRATION: ICD-10-CM

## 2023-03-31 DIAGNOSIS — R07.9 CHEST PAIN: ICD-10-CM

## 2023-03-31 DIAGNOSIS — I51.9 LV DYSFUNCTION: ICD-10-CM

## 2023-03-31 DIAGNOSIS — I50.22 CHRONIC SYSTOLIC CONGESTIVE HEART FAILURE: ICD-10-CM

## 2023-03-31 DIAGNOSIS — Z15.89 MTHFR MUTATION (METHYLENETETRAHYDROFOLATE REDUCTASE): ICD-10-CM

## 2023-03-31 DIAGNOSIS — I48.0 PAROXYSMAL ATRIAL FIBRILLATION: ICD-10-CM

## 2023-03-31 DIAGNOSIS — T14.8XXA MULTIPLE SKIN TEARS: ICD-10-CM

## 2023-03-31 LAB
ALBUMIN SERPL BCP-MCNC: 2.5 G/DL (ref 3.5–5.2)
ALP SERPL-CCNC: 98 U/L (ref 55–135)
ALT SERPL W/O P-5'-P-CCNC: 23 U/L (ref 10–44)
AMPHET+METHAMPHET UR QL: NEGATIVE
ANION GAP SERPL CALC-SCNC: 9 MMOL/L (ref 8–16)
APTT PPP: 23.1 SEC (ref 21–32)
AST SERPL-CCNC: 42 U/L (ref 10–40)
BACTERIA #/AREA URNS HPF: ABNORMAL /HPF
BARBITURATES UR QL SCN>200 NG/ML: NEGATIVE
BASOPHILS # BLD AUTO: 0.03 K/UL (ref 0–0.2)
BASOPHILS NFR BLD: 0.3 % (ref 0–1.9)
BENZODIAZ UR QL SCN>200 NG/ML: ABNORMAL
BILIRUB SERPL-MCNC: 0.9 MG/DL (ref 0.1–1)
BILIRUB UR QL STRIP: ABNORMAL
BUN SERPL-MCNC: 33 MG/DL (ref 8–23)
BZE UR QL SCN: NEGATIVE
CALCIUM SERPL-MCNC: 10.2 MG/DL (ref 8.7–10.5)
CANNABINOIDS UR QL SCN: NEGATIVE
CHLORIDE SERPL-SCNC: 104 MMOL/L (ref 95–110)
CLARITY UR: CLEAR
CO2 SERPL-SCNC: 23 MMOL/L (ref 23–29)
COLOR UR: YELLOW
CREAT SERPL-MCNC: 1.2 MG/DL (ref 0.5–1.4)
CREAT UR-MCNC: 107 MG/DL (ref 23–375)
DIFFERENTIAL METHOD: ABNORMAL
EOSINOPHIL # BLD AUTO: 0.1 K/UL (ref 0–0.5)
EOSINOPHIL NFR BLD: 0.9 % (ref 0–8)
ERYTHROCYTE [DISTWIDTH] IN BLOOD BY AUTOMATED COUNT: 16.1 % (ref 11.5–14.5)
EST. GFR  (NO RACE VARIABLE): >60 ML/MIN/1.73 M^2
GLUCOSE SERPL-MCNC: 126 MG/DL (ref 70–110)
GLUCOSE UR QL STRIP: NEGATIVE
GRAN CASTS #/AREA URNS LPF: 3 /LPF
HCT VFR BLD AUTO: 29.7 % (ref 40–54)
HGB BLD-MCNC: 10.1 G/DL (ref 14–18)
HGB UR QL STRIP: NEGATIVE
HYALINE CASTS #/AREA URNS LPF: 2 /LPF
IMM GRANULOCYTES # BLD AUTO: 0.14 K/UL (ref 0–0.04)
IMM GRANULOCYTES NFR BLD AUTO: 1.4 % (ref 0–0.5)
INR PPP: 1 (ref 0.8–1.2)
KETONES UR QL STRIP: ABNORMAL
LACTATE SERPL-SCNC: 0.8 MMOL/L (ref 0.5–2.2)
LEUKOCYTE ESTERASE UR QL STRIP: NEGATIVE
LYMPHOCYTES # BLD AUTO: 0.7 K/UL (ref 1–4.8)
LYMPHOCYTES NFR BLD: 7 % (ref 18–48)
MAGNESIUM SERPL-MCNC: 1.9 MG/DL (ref 1.6–2.6)
MCH RBC QN AUTO: 33.1 PG (ref 27–31)
MCHC RBC AUTO-ENTMCNC: 34 G/DL (ref 32–36)
MCV RBC AUTO: 97 FL (ref 82–98)
METHADONE UR QL SCN>300 NG/ML: NEGATIVE
MICROSCOPIC COMMENT: ABNORMAL
MONOCYTES # BLD AUTO: 0.6 K/UL (ref 0.3–1)
MONOCYTES NFR BLD: 6 % (ref 4–15)
NEUTROPHILS # BLD AUTO: 8.7 K/UL (ref 1.8–7.7)
NEUTROPHILS NFR BLD: 84.4 % (ref 38–73)
NITRITE UR QL STRIP: NEGATIVE
NRBC BLD-RTO: 0 /100 WBC
OPIATES UR QL SCN: ABNORMAL
PCP UR QL SCN>25 NG/ML: NEGATIVE
PH UR STRIP: 6 [PH] (ref 5–8)
PLATELET # BLD AUTO: 294 K/UL (ref 150–450)
PLATELET BLD QL SMEAR: ABNORMAL
PMV BLD AUTO: 10.7 FL (ref 9.2–12.9)
POCT GLUCOSE: 96 MG/DL (ref 70–110)
POCT GLUCOSE: 99 MG/DL (ref 70–110)
POTASSIUM SERPL-SCNC: 4.6 MMOL/L (ref 3.5–5.1)
PROT SERPL-MCNC: 6.3 G/DL (ref 6–8.4)
PROT UR QL STRIP: ABNORMAL
PROTHROMBIN TIME: 11.4 SEC (ref 9–12.5)
RBC # BLD AUTO: 3.05 M/UL (ref 4.6–6.2)
RBC #/AREA URNS HPF: 0 /HPF (ref 0–4)
SODIUM SERPL-SCNC: 136 MMOL/L (ref 136–145)
SP GR UR STRIP: 1.02 (ref 1–1.03)
SQUAMOUS #/AREA URNS HPF: 1 /HPF
TOXICOLOGY INFORMATION: ABNORMAL
TROPONIN I SERPL DL<=0.01 NG/ML-MCNC: 0.17 NG/ML (ref 0–0.03)
URN SPEC COLLECT METH UR: ABNORMAL
UROBILINOGEN UR STRIP-ACNC: NEGATIVE EU/DL
WBC # BLD AUTO: 10.33 K/UL (ref 3.9–12.7)
WBC #/AREA URNS HPF: 2 /HPF (ref 0–5)

## 2023-03-31 PROCEDURE — 80053 COMPREHEN METABOLIC PANEL: CPT | Performed by: EMERGENCY MEDICINE

## 2023-03-31 PROCEDURE — 36415 COLL VENOUS BLD VENIPUNCTURE: CPT | Performed by: EMERGENCY MEDICINE

## 2023-03-31 PROCEDURE — 87040 BLOOD CULTURE FOR BACTERIA: CPT | Performed by: EMERGENCY MEDICINE

## 2023-03-31 PROCEDURE — 80307 DRUG TEST PRSMV CHEM ANLYZR: CPT | Performed by: EMERGENCY MEDICINE

## 2023-03-31 PROCEDURE — 25000003 PHARM REV CODE 250: Performed by: NURSE PRACTITIONER

## 2023-03-31 PROCEDURE — G0378 HOSPITAL OBSERVATION PER HR: HCPCS

## 2023-03-31 PROCEDURE — 36415 COLL VENOUS BLD VENIPUNCTURE: CPT | Performed by: NURSE PRACTITIONER

## 2023-03-31 PROCEDURE — 85025 COMPLETE CBC W/AUTO DIFF WBC: CPT | Performed by: EMERGENCY MEDICINE

## 2023-03-31 PROCEDURE — 84484 ASSAY OF TROPONIN QUANT: CPT | Performed by: NURSE PRACTITIONER

## 2023-03-31 PROCEDURE — 99285 EMERGENCY DEPT VISIT HI MDM: CPT | Mod: 25

## 2023-03-31 PROCEDURE — 82962 GLUCOSE BLOOD TEST: CPT

## 2023-03-31 PROCEDURE — 81000 URINALYSIS NONAUTO W/SCOPE: CPT | Mod: 59 | Performed by: EMERGENCY MEDICINE

## 2023-03-31 PROCEDURE — 63600175 PHARM REV CODE 636 W HCPCS: Performed by: EMERGENCY MEDICINE

## 2023-03-31 PROCEDURE — P9612 CATHETERIZE FOR URINE SPEC: HCPCS

## 2023-03-31 PROCEDURE — 96361 HYDRATE IV INFUSION ADD-ON: CPT

## 2023-03-31 PROCEDURE — 85610 PROTHROMBIN TIME: CPT | Performed by: EMERGENCY MEDICINE

## 2023-03-31 PROCEDURE — 94761 N-INVAS EAR/PLS OXIMETRY MLT: CPT

## 2023-03-31 PROCEDURE — 83605 ASSAY OF LACTIC ACID: CPT | Performed by: EMERGENCY MEDICINE

## 2023-03-31 PROCEDURE — 83735 ASSAY OF MAGNESIUM: CPT | Performed by: EMERGENCY MEDICINE

## 2023-03-31 PROCEDURE — 85730 THROMBOPLASTIN TIME PARTIAL: CPT | Performed by: EMERGENCY MEDICINE

## 2023-03-31 PROCEDURE — 96360 HYDRATION IV INFUSION INIT: CPT

## 2023-03-31 RX ORDER — SODIUM,POTASSIUM PHOSPHATES 280-250MG
2 POWDER IN PACKET (EA) ORAL
Status: DISCONTINUED | OUTPATIENT
Start: 2023-03-31 | End: 2023-04-05

## 2023-03-31 RX ORDER — ACETAMINOPHEN 325 MG/1
650 TABLET ORAL EVERY 4 HOURS PRN
Status: DISCONTINUED | OUTPATIENT
Start: 2023-03-31 | End: 2023-04-06 | Stop reason: HOSPADM

## 2023-03-31 RX ORDER — ASPIRIN 81 MG/1
81 TABLET ORAL DAILY
Status: DISCONTINUED | OUTPATIENT
Start: 2023-04-01 | End: 2023-04-06 | Stop reason: HOSPADM

## 2023-03-31 RX ORDER — FLUTICASONE PROPIONATE 50 MCG
2 SPRAY, SUSPENSION (ML) NASAL DAILY
Status: DISCONTINUED | OUTPATIENT
Start: 2023-04-01 | End: 2023-04-06 | Stop reason: HOSPADM

## 2023-03-31 RX ORDER — LISINOPRIL 40 MG/1
40 TABLET ORAL NIGHTLY
Status: DISCONTINUED | OUTPATIENT
Start: 2023-03-31 | End: 2023-04-01

## 2023-03-31 RX ORDER — TALC
6 POWDER (GRAM) TOPICAL NIGHTLY PRN
Status: DISCONTINUED | OUTPATIENT
Start: 2023-03-31 | End: 2023-04-06 | Stop reason: HOSPADM

## 2023-03-31 RX ORDER — CARVEDILOL 6.25 MG/1
25 TABLET ORAL 2 TIMES DAILY WITH MEALS
Status: DISCONTINUED | OUTPATIENT
Start: 2023-03-31 | End: 2023-04-01

## 2023-03-31 RX ORDER — DEXTROSE 40 %
15 GEL (GRAM) ORAL
Status: DISCONTINUED | OUTPATIENT
Start: 2023-03-31 | End: 2023-04-06 | Stop reason: HOSPADM

## 2023-03-31 RX ORDER — AMIODARONE HYDROCHLORIDE 100 MG/1
200 TABLET ORAL 2 TIMES DAILY
Status: DISCONTINUED | OUTPATIENT
Start: 2023-03-31 | End: 2023-04-06 | Stop reason: HOSPADM

## 2023-03-31 RX ORDER — GLUCAGON 1 MG
1 KIT INJECTION
Status: DISCONTINUED | OUTPATIENT
Start: 2023-03-31 | End: 2023-04-06 | Stop reason: HOSPADM

## 2023-03-31 RX ORDER — DEXTROSE 40 %
30 GEL (GRAM) ORAL
Status: DISCONTINUED | OUTPATIENT
Start: 2023-03-31 | End: 2023-04-06 | Stop reason: HOSPADM

## 2023-03-31 RX ORDER — LANOLIN ALCOHOL/MO/W.PET/CERES
400 CREAM (GRAM) TOPICAL DAILY
Status: DISCONTINUED | OUTPATIENT
Start: 2023-04-01 | End: 2023-04-06 | Stop reason: HOSPADM

## 2023-03-31 RX ORDER — FAMOTIDINE 20 MG/1
40 TABLET, FILM COATED ORAL DAILY
Status: DISCONTINUED | OUTPATIENT
Start: 2023-04-01 | End: 2023-04-05

## 2023-03-31 RX ORDER — OXYBUTYNIN CHLORIDE 5 MG/1
5 TABLET, EXTENDED RELEASE ORAL DAILY
Status: DISCONTINUED | OUTPATIENT
Start: 2023-04-01 | End: 2023-04-06 | Stop reason: HOSPADM

## 2023-03-31 RX ORDER — CALCITRIOL 0.25 UG/1
0.75 CAPSULE ORAL DAILY
Status: DISCONTINUED | OUTPATIENT
Start: 2023-04-01 | End: 2023-04-06 | Stop reason: HOSPADM

## 2023-03-31 RX ORDER — ATORVASTATIN CALCIUM 40 MG/1
80 TABLET, FILM COATED ORAL DAILY
Status: DISCONTINUED | OUTPATIENT
Start: 2023-04-01 | End: 2023-04-06 | Stop reason: HOSPADM

## 2023-03-31 RX ORDER — SODIUM CHLORIDE 9 MG/ML
INJECTION, SOLUTION INTRAVENOUS CONTINUOUS
Status: DISCONTINUED | OUTPATIENT
Start: 2023-03-31 | End: 2023-04-04

## 2023-03-31 RX ORDER — LANOLIN ALCOHOL/MO/W.PET/CERES
800 CREAM (GRAM) TOPICAL
Status: DISCONTINUED | OUTPATIENT
Start: 2023-03-31 | End: 2023-04-05

## 2023-03-31 RX ORDER — LANOLIN ALCOHOL/MO/W.PET/CERES
1 CREAM (GRAM) TOPICAL 2 TIMES DAILY
Status: DISCONTINUED | OUTPATIENT
Start: 2023-03-31 | End: 2023-04-05

## 2023-03-31 RX ORDER — CLOPIDOGREL BISULFATE 75 MG/1
75 TABLET ORAL DAILY
Status: DISCONTINUED | OUTPATIENT
Start: 2023-04-01 | End: 2023-04-06 | Stop reason: HOSPADM

## 2023-03-31 RX ORDER — INSULIN ASPART 100 [IU]/ML
0-5 INJECTION, SOLUTION INTRAVENOUS; SUBCUTANEOUS
Status: DISCONTINUED | OUTPATIENT
Start: 2023-03-31 | End: 2023-04-06 | Stop reason: HOSPADM

## 2023-03-31 RX ORDER — HYDROCODONE BITARTRATE AND ACETAMINOPHEN 5; 325 MG/1; MG/1
1 TABLET ORAL EVERY 8 HOURS PRN
Status: DISCONTINUED | OUTPATIENT
Start: 2023-03-31 | End: 2023-04-01

## 2023-03-31 RX ORDER — NALOXONE HCL 0.4 MG/ML
0.02 VIAL (ML) INJECTION
Status: DISCONTINUED | OUTPATIENT
Start: 2023-03-31 | End: 2023-04-06 | Stop reason: HOSPADM

## 2023-03-31 RX ORDER — SODIUM CHLORIDE 0.9 % (FLUSH) 0.9 %
10 SYRINGE (ML) INJECTION EVERY 12 HOURS PRN
Status: DISCONTINUED | OUTPATIENT
Start: 2023-03-31 | End: 2023-04-06 | Stop reason: HOSPADM

## 2023-03-31 RX ORDER — ALLOPURINOL 100 MG/1
100 TABLET ORAL NIGHTLY
Status: DISCONTINUED | OUTPATIENT
Start: 2023-03-31 | End: 2023-04-06 | Stop reason: HOSPADM

## 2023-03-31 RX ORDER — PANTOPRAZOLE SODIUM 40 MG/1
40 TABLET, DELAYED RELEASE ORAL DAILY
Status: DISCONTINUED | OUTPATIENT
Start: 2023-04-01 | End: 2023-04-06 | Stop reason: HOSPADM

## 2023-03-31 RX ORDER — ONDANSETRON 2 MG/ML
4 INJECTION INTRAMUSCULAR; INTRAVENOUS EVERY 8 HOURS PRN
Status: DISCONTINUED | OUTPATIENT
Start: 2023-03-31 | End: 2023-04-06 | Stop reason: HOSPADM

## 2023-03-31 RX ORDER — WARFARIN 2.5 MG/1
2.5 TABLET ORAL DAILY
Status: DISCONTINUED | OUTPATIENT
Start: 2023-04-01 | End: 2023-04-03

## 2023-03-31 RX ADMIN — CARVEDILOL 25 MG: 6.25 TABLET, FILM COATED ORAL at 10:03

## 2023-03-31 RX ADMIN — FERROUS SULFATE TAB 325 MG (65 MG ELEMENTAL FE) 1 EACH: 325 (65 FE) TAB at 10:03

## 2023-03-31 RX ADMIN — AMIODARONE HYDROCHLORIDE 200 MG: 100 TABLET ORAL at 10:03

## 2023-03-31 RX ADMIN — LISINOPRIL 40 MG: 40 TABLET ORAL at 10:03

## 2023-03-31 RX ADMIN — ALLOPURINOL 100 MG: 100 TABLET ORAL at 10:03

## 2023-03-31 RX ADMIN — SODIUM CHLORIDE, POTASSIUM CHLORIDE, SODIUM LACTATE AND CALCIUM CHLORIDE 500 ML: 600; 310; 30; 20 INJECTION, SOLUTION INTRAVENOUS at 01:03

## 2023-03-31 RX ADMIN — SODIUM CHLORIDE: 9 INJECTION, SOLUTION INTRAVENOUS at 08:03

## 2023-03-31 NOTE — ASSESSMENT & PLAN NOTE
Pt experienced episode of altered mental status with decreased LOC after receiving 2 pain pills and undergoing physical therapy  Suspect this is etiology of episode   Patient back at baseline currently   Will obtain EEG and consult Neurology   Patient can not go under MRI due to recent cervical instrumentation   CT head reviewed and negative for acute findings

## 2023-03-31 NOTE — HPI
"Richard Bustos is a 77 yr old male with PMHx significant for  PMHx of HTN, CAD s/p cardiac stents, lumbar DDD with chronic bilat LE weakness, HLD, gout, MI, CHF, arthritis, A-fib, on Coumadin, DM II, dementia, and MTHFR mutation who presents to the ED via EMS from [facility].  History obtained by ED physician due to pts underlying dementia. Per ED staff daughter reports after arriving to the shelter earlier today, she noticed the patient was "starring off into space" ~20 minutes after a PT session.  EMS states upon their arrival on scene, staff mentioned the patient's respiratory rate dropped to 6 with an associated systolic pressure of 70 mmHg.  EMS reported the nursing home personnel described an episode "resembling a seizure without jerking" prior to EMS arrival.  Pt had  normal CBG en route to the ED.  Upon arrival to ED pt was awake and alert and responding appropriately and bp was stable. Per chart review pt experienced a fall on 03/17/2023 which resulted in cervical fracture with cord edema and pt underwent cervical spine fusion performed on 03/24/2023 by Dr. Kc. Pt was d/c to skilled Nursing Facility 3 days ago.  Patient recalls working with PT this morning.  He reports he received 2 pain pills prior to his PT session and this caused him extreme fatigue and he believes this is the cause of his episode today.  Patient denies any acute complaints at this time.  He reports chronic weakness that is the same as prior to his surgery.  Patient is pleasantly confused but oriented to name, date of birth and year. Family not present at BS. Pt reports he feels back as baseline. Pt will be admitted to hospital medicine services for further workup and management.  "

## 2023-03-31 NOTE — ED PROVIDER NOTES
"Encounter Date: 3/31/2023    SCRIBE #1 NOTE: ISujey, am scribing for, and in the presence of,  Souleymane Suárez MD.     History     Chief Complaint   Patient presents with    Altered Mental Status     Brief peroid of altered mental status  / awake , answers questions on arrival to ED     Time seen by provider: 10:56 AM on 03/31/2023    Richard Bustos is a 77 y.o. male with a PMHx of HTN, CAD, HLD, gout, MI, CHF, arthritis, A-fib, on Coumadin, DM II, dementia, and MTHFR mutation who presents to the ED via EMS from [facility].  Patient is with his daughter at bedside for evaluation of persistent LLE/BLE weakness and a worsening AMS since a fall ~2 weeks ago.  History obtained from an independent historian:  Daughter reports after arriving to the [facility] earlier today, she noticed the patient was "staring off into space" ~20 minutes after a PT session.  EMS states upon their arrival on scene, staff mentions the patient's respiratory rate dropped to 6 with an associated systolic pressure of 70 mmHg.  EMS notes the nursing home personnel described an episode "resembling a seizure without jerking" prior to EMS arrival.  They confirm a normal CBG en route to the ED.  Patient is alert, according to EMS, but confused.  They reference a fall on 03/17/2023 which required a cervical fracture repair.  Review of external records performed:  Patient had a C-spine fusion performed on 03/24/2023 with d/c to skilled Nursing Facility 3 days ago.  No other complaints mentioned at this time.  Additional PSHx includes cardiac stents x 3 and joint replacement of the bilateral hips.  HPI limited secondary to the patient's condition.      The history is provided by the patient, the EMS personnel and a relative. The history is limited by the condition of the patient.   Review of patient's allergies indicates:   Allergen Reactions    Donepezil Other (See Comments)     AMS    Bactroban [mupirocin calcium] Blisters "     Causes Blisters    Shellfish containing products Other (See Comments)     Other reaction(s): Gout  OYSTERS     Past Medical History:   Diagnosis Date    Anemia     Anemia of other chronic disease 09/13/2017    Anemia, chronic renal failure 09/13/2017    Anemia, unspecified 09/13/2017    Anticoagulant long-term use     Aorta aneurysm     Arthritis     Atrial fibrillation     Bacteremia due to Streptococcus 01/08/2022    CAD (coronary artery disease)     CHF (congestive heart failure)     Chronic kidney disease     Clotting disorder 09/13/2017    Colon polyp     Dementia     Diabetes mellitus     not on meds    Diabetes mellitus type II     Diverticulosis     Elevated PSA     Encounter for blood transfusion     Former smoker     General anesthetics causing adverse effect in therapeutic use     TROUBLE COMING OUT OF ANESTHESIA WITH GASTRIC BYPASS    GERD (gastroesophageal reflux disease)     Gout     Hx of colonic polyps     Hypercoagulable state     Hyperlipidemia     Hypertension     MI, old 2010    MTHFR mutation     Myocardial infarction     9/2010    Osteomyelitis of ankle or foot 03/09/2017    Prostate cancer 06/2016    Sleep apnea     Squamous cell carcinoma 01/23/2018    Left helix, imiquimod    Venous stasis     Chronic     Past Surgical History:   Procedure Laterality Date    ABDOMINAL SURGERY      CARDIAC SURGERY      3 STENTS     CAUDAL EPIDURAL STEROID INJECTION N/A 6/22/2020    Procedure: INJECTION, STEROID, SPINE, EPIDURAL, CAUDAL;  Surgeon: Evangelist Bose MD;  Location: [facility] OR;  Service: Pain Management;  Laterality: N/A;    COLONOSCOPY  02/2011    diverticulosis with diverticula with clot, no active bleeding    COLONOSCOPY N/A 8/24/2016    Procedure: COLONOSCOPY;  Surgeon: Saroj Borjas MD;  Location: [facility];  Service: Endoscopy;  Laterality: N/A;    COLONOSCOPY N/A 11/18/2020    Procedure: COLONOSCOPY;  Surgeon: Saroj Borjas MD;  Location: [facility];  Service: Endoscopy;   Laterality: N/A;    COLONOSCOPY N/A 10/27/2021    Procedure: COLONOSCOPY;  Surgeon: Saroj Borjas MD;  Location: [facility] ENDO;  Service: Endoscopy;  Laterality: N/A;    EPIDURAL STEROID INJECTION INTO LUMBAR SPINE N/A 6/22/2020    Procedure: Injection-steroid-epidural-lumbar;  Surgeon: Evangelist Bose MD;  Location: [facility] OR;  Service: Pain Management;  Laterality: N/A;  L3 L4 L5 S1    EPIDURAL STEROID INJECTION INTO LUMBAR SPINE N/A 9/21/2020    Procedure: Injection-steroid-epidural-lumbar;  Surgeon: Evangelist Bose MD;  Location: [facility] OR;  Service: Pain Management;  Laterality: N/A;  L5-S1    FUSION, SPINE, CERVICAL N/A 3/24/2023    Procedure: FUSION, SPINE, CERVICAL;  Surgeon: Kike Kc DO;  Location: [facility] OR;  Service: Neurosurgery;  Laterality: N/A;  posterior cervical decompression/fusion C3-T1    GASTRIC BYPASS  2/5/2008    IVC FILTER RETRIEVAL      JOINT REPLACEMENT  1996 and 2001    bi-lat hip replacement/Rt Hip and Lt Hip    RADIOFREQUENCY ABLATION OF LUMBAR MEDIAL BRANCH NERVE AT SINGLE LEVEL Bilateral 1/10/2020    Procedure: Radiofrequency Ablation, Nerve, Spinal, Lumbar, Medial Branch, 1 Level;  Surgeon: Evangelist Bose MD;  Location: [facility] OR;  Service: Pain Management;  Laterality: Bilateral;  L3,L4,L5    RADIOFREQUENCY ABLATION OF LUMBAR MEDIAL BRANCH NERVE AT SINGLE LEVEL Bilateral 11/23/2021    Procedure: Radiofrequency Ablation, Nerve, Spinal, Lumbar, Medial Branch, Bilateral L 3,4,5;  Surgeon: Nile Causey MD;  Location: [facility] OR;  Service: Pain Management;  Laterality: Bilateral;    ROTATOR CUFF REPAIR      Rt shoulder    Stents  8/18/2010    x 3    UPPER GASTROINTESTINAL ENDOSCOPY  02/2011     Family History   Problem Relation Age of Onset    Heart attack Mother     Heart attack Father     Heart attack Brother     Ulcerative colitis Daughter 35    Lupus Daughter     Colon cancer Neg Hx     Colon polyps Neg Hx     Crohn's disease Neg Hx     Melanoma Neg Hx     Psoriasis Neg Hx     Eczema  Neg Hx      Social History     Tobacco Use    Smoking status: Former    Smokeless tobacco: Never    Tobacco comments:     45 yrs ago, only smoked 3-4 packs in his entire life as a teenager   Substance Use Topics    Alcohol use: Not Currently     Comment: rare    Drug use: No     Review of Systems   Unable to perform ROS: Acuity of condition   Neurological:  Negative for tremors.        Positive for an episode resembling a seizure without jerking.   Hematological:  Bruises/bleeds easily.   Psychiatric/Behavioral:  Positive for confusion.      Physical Exam     Initial Vitals [03/31/23 1038]   BP Pulse Resp Temp SpO2   (!) 154/78 73 18 98.2 °F (36.8 °C) 97 %      MAP       --         Physical Exam    Nursing note and vitals reviewed.  Constitutional: He appears well-developed and well-nourished. He is not diaphoretic. No distress. Cervical collar in place.   HENT:   Head: Normocephalic and atraumatic.   Eyes: EOM are normal. Pupils are equal, round, and reactive to light.   Neck: Neck supple.   Cervical collar in place.    Normal range of motion.  Cardiovascular:  Normal rate, regular rhythm, normal heart sounds and intact distal pulses.     Exam reveals no gallop and no friction rub.       No murmur heard.  Pulmonary/Chest: Breath sounds normal. No respiratory distress. He has no wheezes. He has no rhonchi. He has no rales.   Abdominal: Abdomen is soft. Bowel sounds are normal. There is no abdominal tenderness. There is no rebound and no guarding.   Musculoskeletal:         General: Normal range of motion.      Cervical back: Normal range of motion and neck supple.     Neurological: He is alert and oriented to person, place, and time.   Weakness noted to the LUE.  Only mildly moving the L hand.   Skin: Skin is warm. Ecchymosis noted.   Ecchymosis noted to the entire face, bilateral forearms, and anterior chest.     Psychiatric: He has a normal mood and affect. His behavior is normal. Judgment and thought content  normal.   Confused intermittently, but answers questions appropriately.         ED Course   Procedures  Labs Reviewed   URINALYSIS, REFLEX TO URINE CULTURE - Abnormal; Notable for the following components:       Result Value    Protein, UA 2+ (*)     Ketones, UA 1+ (*)     Bilirubin (UA) 1+ (*)     All other components within normal limits    Narrative:     Specimen Source->Urine   COMPREHENSIVE METABOLIC PANEL - Abnormal; Notable for the following components:    Glucose 126 (*)     BUN 33 (*)     Albumin 2.5 (*)     AST 42 (*)     All other components within normal limits   DRUG SCREEN PANEL, URINE EMERGENCY - Abnormal; Notable for the following components:    Benzodiazepines Presumptive Positive (*)     Opiate Scrn, Ur Presumptive Positive (*)     All other components within normal limits    Narrative:     Specimen Source->Urine   CBC W/ AUTO DIFFERENTIAL - Abnormal; Notable for the following components:    RBC 3.05 (*)     Hemoglobin 10.1 (*)     Hematocrit 29.7 (*)     MCH 33.1 (*)     RDW 16.1 (*)     Immature Granulocytes 1.4 (*)     Gran # (ANC) 8.7 (*)     Immature Grans (Abs) 0.14 (*)     Lymph # 0.7 (*)     Gran % 84.4 (*)     Lymph % 7.0 (*)     All other components within normal limits   URINALYSIS MICROSCOPIC - Abnormal; Notable for the following components:    Bacteria Moderate (*)     Hyaline Casts, UA 2 (*)     Granular Casts, UA 3 (*)     All other components within normal limits    Narrative:     Specimen Source->Urine   TROPONIN I - Abnormal; Notable for the following components:    Troponin I 0.173 (*)     All other components within normal limits   CULTURE, BLOOD   CULTURE, BLOOD   LACTIC ACID, PLASMA   MAGNESIUM   PROTIME-INR   APTT          Imaging Results              CT Head Without Contrast (Final result)  Result time 03/31/23 12:42:52      Final result by Emanuel Dinh MD (03/31/23 12:42:52)                   Impression:      1. There is no acute intracranial abnormality or definite  change compared to the prior study.  There is volume loss, nonspecific white matter change with remote lacunar infarction in the right cerebellum but there is no intracranial hemorrhage, mass effect or obvious acute infarction.  It should be noted that MRI is more sensitive in the detection of subtle or acute nonhemorrhagic ischemic disease.  2. There is a large frontal scalp hematoma without underlying fracture.  3. There is no change in the nasal bone fractures  4. There is subcutaneous emphysema in addition to posterior skin staples related to recent cervical spine surgery.      Electronically signed by: Emanuel Dinh MD  Date:    03/31/2023  Time:    12:42               Narrative:    EXAMINATION:  CT HEAD WITHOUT CONTRAST    CLINICAL HISTORY:  Mental status change, unknown cause;    TECHNIQUE:  Routine unenhanced axial images were obtained through the head.  Sagittal and coronal reformatted images were created.  The study is reviewed in bone and soft tissue windows.    COMPARISON:  Brain MRI dated 03/21/2023, head CT dated 03/20/2023    FINDINGS:  Intracranial contents: The study is mildly motion degraded.  There is no acute intracranial abnormality.  There is generalized volume loss and nonspecific white matter change.  There is a remote lacunar infarction in the right cerebellum.  There is no hemorrhage or mass.  There is no obvious acute infarction.  There is no hydrocephalus or midline shift.  There is no abnormal extra-axial fluid collection.    Extracranial contents, calvarium, soft tissues: There is a large frontal scalp hematoma without underlying frontal skull fracture.  Redemonstrated are nasal bone fractures.  The paranasal sinuses and mastoid air cells are clear.  There is moderate atherosclerosis in the vessels at the base of the brain.  There are skin staples in the posterior upper neck midline soft tissues.  There is soft tissue air in the soft tissues posterior to the posterior arch of C1,  likely related to recent repair of cervical spine fracture demonstrated on cervical spine CT dated 03/17/2023.                                       X-Ray Chest AP Portable (Final result)  Result time 03/31/23 12:49:44      Final result by Bernie Lyon MD (03/31/23 12:49:44)                   Impression:      No acute cardiopulmonary disease      Electronically signed by: Bernie Lyon MD  Date:    03/31/2023  Time:    12:49               Narrative:    EXAMINATION:  XR CHEST AP PORTABLE    CLINICAL HISTORY:  AMS;    TECHNIQUE:  Single frontal view of the chest was performed.    COMPARISON:  03/22/2023    FINDINGS:  Stable cardiomediastinal silhouette.  Lungs are clear of infiltrate.  No pleural effusion.  Postoperative changes noted in the lower cervical spine                                       Medications   lactated ringers bolus 500 mL (0 mLs Intravenous Stopped 3/31/23 1430)     Medical Decision Making:   History:   Old Medical Records: I decided to obtain old medical records.  Initial Assessment:   77-year-old male presented with an episode of altered mental status.  Differential Diagnosis:   Initial differential diagnosis included but not limited to intracranial hemorrhage, dehydration, and UTI.  Clinical Tests:   Lab Tests: Ordered and Reviewed  Radiological Study: Ordered and Reviewed  ED Management:  The patient was emergently evaluated in the emergency department, his evaluation was significant for an elderly male with polytrauma still noted from his fall a couple of weeks ago, as well as mild confusion.  The patient's CT scan of his head showed no acute abnormalities per Radiology.  The patient's labs were significant for dehydration as well as benzodiazepines and opiates noted in his toxicology screen.  The patient was started on IV fluids here in the emergency department.  I will admit him to the hospitalist service for further care and workup.  The case was discussed with the APC on call for  the hospitalist service.  She has accepted the patient for admission.        Scribe Attestation:   Scribe #1: I performed the above scribed service and the documentation accurately describes the services I performed. I attest to the accuracy of the note.               I, Dr. Souleymane Suárez, personally performed the services described in this documentation. All medical record entries made by the scribe were at my direction and in my presence.  I have reviewed the chart and agree that the record reflects my personal performance and is accurate and complete. Souleymane Suárez MD.  4:43 PM 03/31/2023      Clinical Impression:   Final diagnoses:  [R41.82] Altered mental status, unspecified altered mental status type (Primary)  [E86.0] Dehydration        ED Disposition Condition    Observation Stable                Souleymane Suárez MD  03/31/23 4523

## 2023-03-31 NOTE — ASSESSMENT & PLAN NOTE
Chronic, pt with increasing deconditioning since fall and cervical fracture  Cont PT/OT  Fall precautions

## 2023-03-31 NOTE — H&P
"[facility] Medicine  History & Physical    Patient Name: Richard Bustos  MRN: [id number]  Patient Class: OP- Observation  Admission Date: 3/31/2023  Attending Physician: Heather Shah MD   Primary Care Provider: Familia Ramirez Jr, MD         Patient information was obtained from patient, past medical records and ER records.     Subjective:     Principal Problem:Altered mental status    Chief Complaint:   Chief Complaint   Patient presents with    Altered Mental Status     Brief peroid of altered mental status  / awake , answers questions on arrival to ED        HPI: Richard Bustos is a 77 yr old male with PMHx significant for  PMHx of HTN, CAD s/p cardiac stents, lumbar DDD with chronic bilat LE weakness, HLD, gout, MI, CHF, arthritis, A-fib, on Coumadin, DM II, dementia, and MTHFR mutation who presents to the ED via EMS from [facility].  History obtained by ED physician due to pts underlying dementia. Per ED staff daughter reports after arriving to the group home earlier today, she noticed the patient was "starring off into space" ~20 minutes after a PT session.  EMS states upon their arrival on scene, staff mentioned the patient's respiratory rate dropped to 6 with an associated systolic pressure of 70 mmHg.  EMS reported the nursing home personnel described an episode "resembling a seizure without jerking" prior to EMS arrival.  Pt had  normal CBG en route to the ED.  Upon arrival to ED pt was awake and alert and responding appropriately and bp was stable. Per chart review pt experienced a fall on 03/17/2023 which resulted in cervical fracture with cord edema and pt underwent cervical spine fusion performed on 03/24/2023 by Dr. Kc. Pt was d/c to skilled Nursing Facility 3 days ago.  Patient recalls working with PT this morning.  He reports he received 2 pain pills prior to his PT session and this caused him extreme fatigue and he believes this is the cause of his episode today.  " Patient denies any acute complaints at this time.  He reports chronic weakness that is the same as prior to his surgery.  Patient is pleasantly confused but oriented to name, date of birth and year. Family not present at BS. Pt reports he feels back as baseline. Pt will be admitted to hospital medicine services for further workup and management.      Past Medical History:   Diagnosis Date    Anemia     Anemia of other chronic disease 09/13/2017    Anemia, chronic renal failure 09/13/2017    Anemia, unspecified 09/13/2017    Anticoagulant long-term use     Aorta aneurysm     Arthritis     Atrial fibrillation     Bacteremia due to Streptococcus 01/08/2022    CAD (coronary artery disease)     CHF (congestive heart failure)     Chronic kidney disease     Clotting disorder 09/13/2017    Colon polyp     Dementia     Diabetes mellitus     not on meds    Diabetes mellitus type II     Diverticulosis     Elevated PSA     Encounter for blood transfusion     Former smoker     General anesthetics causing adverse effect in therapeutic use     TROUBLE COMING OUT OF ANESTHESIA WITH GASTRIC BYPASS    GERD (gastroesophageal reflux disease)     Gout     Hx of colonic polyps     Hypercoagulable state     Hyperlipidemia     Hypertension     MI, old 2010    MTHFR mutation     Myocardial infarction     9/2010    Osteomyelitis of ankle or foot 03/09/2017    Prostate cancer 06/2016    Sleep apnea     Squamous cell carcinoma 01/23/2018    Left helix, imiquimod    Venous stasis     Chronic       Past Surgical History:   Procedure Laterality Date    ABDOMINAL SURGERY      CARDIAC SURGERY      3 STENTS     CAUDAL EPIDURAL STEROID INJECTION N/A 6/22/2020    Procedure: INJECTION, STEROID, SPINE, EPIDURAL, CAUDAL;  Surgeon: Evangelist Bose MD;  Location: [facility];  Service: Pain Management;  Laterality: N/A;    COLONOSCOPY  02/2011    diverticulosis with diverticula with clot, no active bleeding     COLONOSCOPY N/A 8/24/2016    Procedure: COLONOSCOPY;  Surgeon: Saroj Borjas MD;  Location: [facility] ENDO;  Service: Endoscopy;  Laterality: N/A;    COLONOSCOPY N/A 11/18/2020    Procedure: COLONOSCOPY;  Surgeon: Saroj Borjas MD;  Location: [facility] ENDO;  Service: Endoscopy;  Laterality: N/A;    COLONOSCOPY N/A 10/27/2021    Procedure: COLONOSCOPY;  Surgeon: Saroj Borjas MD;  Location: [facility] ENDO;  Service: Endoscopy;  Laterality: N/A;    EPIDURAL STEROID INJECTION INTO LUMBAR SPINE N/A 6/22/2020    Procedure: Injection-steroid-epidural-lumbar;  Surgeon: Evangelist Bose MD;  Location: [facility] OR;  Service: Pain Management;  Laterality: N/A;  L3 L4 L5 S1    EPIDURAL STEROID INJECTION INTO LUMBAR SPINE N/A 9/21/2020    Procedure: Injection-steroid-epidural-lumbar;  Surgeon: Evangelsit Bose MD;  Location: [facility] OR;  Service: Pain Management;  Laterality: N/A;  L5-S1    FUSION, SPINE, CERVICAL N/A 3/24/2023    Procedure: FUSION, SPINE, CERVICAL;  Surgeon: Kike Kc DO;  Location: [facility] OR;  Service: Neurosurgery;  Laterality: N/A;  posterior cervical decompression/fusion C3-T1    GASTRIC BYPASS  2/5/2008    IVC FILTER RETRIEVAL      JOINT REPLACEMENT  1996 and 2001    bi-lat hip replacement/Rt Hip and Lt Hip    RADIOFREQUENCY ABLATION OF LUMBAR MEDIAL BRANCH NERVE AT SINGLE LEVEL Bilateral 1/10/2020    Procedure: Radiofrequency Ablation, Nerve, Spinal, Lumbar, Medial Branch, 1 Level;  Surgeon: Evangelist Bose MD;  Location: [facility] OR;  Service: Pain Management;  Laterality: Bilateral;  L3,L4,L5    RADIOFREQUENCY ABLATION OF LUMBAR MEDIAL BRANCH NERVE AT SINGLE LEVEL Bilateral 11/23/2021    Procedure: Radiofrequency Ablation, Nerve, Spinal, Lumbar, Medial Branch, Bilateral L 3,4,5;  Surgeon: Nile Causey MD;  Location: [facility] OR;  Service: Pain Management;  Laterality: Bilateral;    ROTATOR CUFF REPAIR      Rt shoulder    Stents  8/18/2010    x 3    UPPER GASTROINTESTINAL ENDOSCOPY  02/2011        Review of patient's allergies indicates:   Allergen Reactions    Donepezil Other (See Comments)     AMS    Bactroban [mupirocin calcium] Blisters     Causes Blisters    Shellfish containing products Other (See Comments)     Other reaction(s): Gout  OYSTERS       Current Facility-Administered Medications on File Prior to Encounter   Medication    lactated ringers infusion     Current Outpatient Medications on File Prior to Encounter   Medication Sig    allopurinoL (ZYLOPRIM) 100 MG tablet Take 1 tablet (100 mg total) by mouth every evening.    amiodarone (PACERONE) 200 MG Tab Take 1 tablet (200 mg total) by mouth 2 (two) times daily.    aspirin (ECOTRIN) 81 MG EC tablet Take 81 mg by mouth once daily.    atorvastatin (LIPITOR) 80 MG tablet Take 1 tablet (80 mg total) by mouth once daily.    calcitRIOL (ROCALTROL) 0.5 MCG Cap 0.75 mcg once daily.     carvediloL (COREG) 25 MG tablet Take 1 tablet (25 mg total) by mouth 2 (two) times daily with meals.    clopidogreL (PLAVIX) 75 mg tablet Take 1 tablet (75 mg total) by mouth once daily.    famotidine (PEPCID) 40 MG tablet Take 1 tablet (40 mg total) by mouth once daily.    ferrous sulfate (FEROSUL) 325 mg (65 mg iron) Tab tablet TAKE 1 TABLET BY MOUTH TWICE DAILY    fluticasone propionate (FLONASE) 50 mcg/actuation nasal spray SHAKE LIQUID AND USE 2 SPRAYS(100 MCG) IN EACH NOSTRIL EVERY DAY    FOLIC ACID/MULTIVIT-MIN/LUTEIN (CENTRUM SILVER ORAL) Take 1 tablet by mouth once daily.    HYDROcodone-acetaminophen (NORCO) 5-325 mg per tablet Take 1 tablet by mouth every 8 (eight) hours as needed for Pain.    LIDOcaine (LIDODERM) 5 % Place 1 patch onto the skin once daily. Remove & Discard patch within 12 hours or as directed by MD    lisinopriL (PRINIVIL,ZESTRIL) 40 MG tablet Take 1 tablet (40 mg total) by mouth every evening.    magnesium oxide (MAG-OX) 400 mg (241.3 mg magnesium) tablet Take 1 tablet (400 mg total) by mouth once daily.     mecobal-levomefolat Ca-B6 phos (L-METHYL-B6-B12) 3-35-2 mg Tab Take 1 tablet by mouth 2 (two) times daily.    mirabegron (MYRBETRIQ) 50 mg Tb24 Take 1 tablet (50 mg total) by mouth once daily.    nitroGLYCERIN 0.4 MG/DOSE TL SPRY (NITROLINGUAL) 400 mcg/spray spray PLACE 1 SPRAY UNDER THE TONGUE EVERY 5 MINUTES AS NEEDED FOR CHEST PAIN    nystatin (MYCOSTATIN) powder Apply topically 2 (two) times daily.    omeprazole (PRILOSEC) 20 MG capsule Take 1 capsule (20 mg total) by mouth once daily.    prothrombin time/INR test metr Misc 1 Stick by Misc.(Non-Drug; Combo Route) route every 30 days.    traZODone (DESYREL) 50 MG tablet Take 1 tablet (50 mg total) by mouth every evening.    vit A/vit C/vit E/zinc/copper (PRESERVISION AREDS ORAL) Take by mouth 2 (two) times a day.     warfarin (COUMADIN) 5 MG tablet 5 mg except on wednesday and saturday Wed and sat 2.5 mgs (Patient taking differently: Take 2.5 mg by mouth Daily. 5 mg except on wednesday and saturday Wed and sat 2.5 mgs)     Family History       Problem Relation (Age of Onset)    Heart attack Mother, Father, Brother    Lupus Daughter    Ulcerative colitis Daughter (35)          Tobacco Use    Smoking status: Former    Smokeless tobacco: Never    Tobacco comments:     45 yrs ago, only smoked 3-4 packs in his entire life as a teenager   Substance and Sexual Activity    Alcohol use: Not Currently     Comment: rare    Drug use: No    Sexual activity: Not Currently     Review of Systems   Constitutional:  Positive for fatigue. Negative for chills and fever.   HENT:  Negative for congestion, sore throat and trouble swallowing.    Respiratory:  Negative for cough, chest tightness and shortness of breath.    Cardiovascular:  Negative for chest pain, palpitations and leg swelling.   Gastrointestinal:  Negative for abdominal pain, diarrhea, nausea and vomiting.   Genitourinary:  Negative for difficulty urinating, dysuria, frequency and urgency.    Musculoskeletal:  Positive for gait problem. Negative for arthralgias and back pain.   Skin:  Positive for color change and wound. Negative for pallor and rash.   Psychiatric/Behavioral:  Negative for agitation, behavioral problems and confusion.    All other systems reviewed and are negative.  Objective:     Vital Signs (Most Recent):  Temp: 98.2 °F (36.8 °C) (03/31/23 1038)  Pulse: 73 (03/31/23 1132)  Resp: 20 (03/31/23 1132)  BP: (!) 148/71 (03/31/23 1132)  SpO2: 96 % (03/31/23 1132)   Vital Signs (24h Range):  Temp:  [98.2 °F (36.8 °C)] 98.2 °F (36.8 °C)  Pulse:  [72-73] 73  Resp:  [18-20] 20  SpO2:  [96 %-97 %] 96 %  BP: (143-154)/(67-78) 148/71     Weight: 111.6 kg (246 lb)  Body mass index is 36.33 kg/m².    Physical Exam  Vitals and nursing note reviewed.   Constitutional:       General: He is not in acute distress.     Appearance: He is well-developed. He is ill-appearing (chronically ill appearing). He is not diaphoretic.   HENT:      Head: Normocephalic.      Comments: Significant ecchymosis in various stages to face, large hematoma to forehead, scattered abrasions to face     Right Ear: External ear normal.      Left Ear: External ear normal.      Nose: Nose normal. No congestion or rhinorrhea.      Mouth/Throat:      Mouth: Mucous membranes are moist.      Pharynx: Oropharynx is clear. No oropharyngeal exudate or posterior oropharyngeal erythema.   Eyes:      General: No scleral icterus.     Conjunctiva/sclera: Conjunctivae normal.      Pupils: Pupils are equal, round, and reactive to light.   Neck:      Vascular: No JVD.   Cardiovascular:      Rate and Rhythm: Normal rate and regular rhythm.      Pulses: Normal pulses.      Heart sounds: Normal heart sounds. No murmur heard.  Pulmonary:      Effort: Pulmonary effort is normal. No respiratory distress.      Breath sounds: Normal breath sounds. No stridor. No wheezing, rhonchi or rales.   Abdominal:      General: Bowel sounds are normal. There is no  distension.      Palpations: Abdomen is soft.      Tenderness: There is no abdominal tenderness.   Musculoskeletal:         General: Swelling present. No tenderness.      Cervical back: Normal range of motion and neck supple.      Comments: Edema LUE   Skin:     General: Skin is warm and dry.      Capillary Refill: Capillary refill takes 2 to 3 seconds.      Coloration: Skin is not jaundiced or pale.      Findings: Bruising present. No erythema.      Comments: Significant bruising head to toe with scattered abrasions and skin tears   Neurological:      General: No focal deficit present.      Mental Status: He is alert and oriented to person, place, and time.      Cranial Nerves: No cranial nerve deficit.      Sensory: No sensory deficit.      Motor: Weakness present.      Comments: Chronic generalized weakness to all exts, 2/5 strength   Psychiatric:         Mood and Affect: Mood normal.         Behavior: Behavior normal.         Thought Content: Thought content normal.         CRANIAL NERVES     CN III, IV, VI   Pupils are equal, round, and reactive to light.     Significant Labs: All pertinent labs within the past 24 hours have been reviewed.  CBC:   Recent Labs   Lab 03/31/23  1100   WBC 10.33   HGB 10.1*   HCT 29.7*        CMP:   Recent Labs   Lab 03/31/23  1100      K 4.6      CO2 23   *   BUN 33*   CREATININE 1.2   CALCIUM 10.2   PROT 6.3   ALBUMIN 2.5*   BILITOT 0.9   ALKPHOS 98   AST 42*   ALT 23   ANIONGAP 9       Significant Imaging: I have reviewed all pertinent imaging results/findings within the past 24 hours.    Assessment/Plan:     * Altered mental status  Pt experienced episode of altered mental status with decreased LOC after receiving 2 pain pills and undergoing physical therapy  Suspect this is etiology of episode   Patient back at baseline currently   Will obtain EEG and consult Neurology   Patient can not go under MRI due to recent cervical instrumentation   CT head  reviewed and negative for acute findings        Closed nondisplaced fracture of sixth cervical vertebra  Per chart review pt experienced a fall on 03/17/2023 which resulted in cervical fracture with cord edema and pt underwent cervical spine fusion performed on 03/24/2023 by Dr. Kc. Pt was d/c to skilled Nursing Facility 3 days ago.   Cont PT/OT  Fall precautions      Generalized weakness  Chronic, pt with increasing deconditioning since fall and cervical fracture  Cont PT/OT  Fall precautions      Paroxysmal atrial fibrillation  Patient with Paroxysmal (<7 days) atrial fibrillation which is controlled currently with Amiodarone. Patient is currently in sinus rhythm.[id number] Score: 4. Anticoagulation indicated. Anticoagulation done with coumadin.        Hyperlipidemia associated with type 2 diabetes mellitus  Chronic, stable  Cont home meds    Hypertension associated with diabetes  Chronic, controlled.  Latest blood pressure and vitals reviewed-   Temp:  [98.2 °F (36.8 °C)]   Pulse:  [72-73]   Resp:  [18-20]   BP: (143-154)/(67-78)   SpO2:  [96 %-97 %] .   Home meds for hypertension were reviewed and noted below.   Hypertension Medications             carvediloL (COREG) 25 MG tablet Take 1 tablet (25 mg total) by mouth 2 (two) times daily with meals.    lisinopriL (PRINIVIL,ZESTRIL) 40 MG tablet Take 1 tablet (40 mg total) by mouth every evening.    nitroGLYCERIN 0.4 MG/DOSE TL SPRY (NITROLINGUAL) 400 mcg/spray spray PLACE 1 SPRAY UNDER THE TONGUE EVERY 5 MINUTES AS NEEDED FOR CHEST PAIN          While in the hospital, will manage blood pressure as follows; Continue home antihypertensive regimen    Will utilize p.r.n. blood pressure medication only if patient's blood pressure greater than  180/110 and he develops symptoms such as worsening chest pain or shortness of breath.      MTHFR mutation (methylenetetrahydrofolate reductase)  Cont coumadin  Monitor labs      Diabetes mellitus with chronic kidney disease,  stage III  Patient's FSGs are controlled on current medication regimen.  Last A1c reviewed-   Lab Results   Component Value Date    LABA1C 6.6 (H) 08/08/2016    HGBA1C 5.1 01/06/2022     Most recent fingerstick glucose reviewed- No results for input(s): POCTGLUCOSE in the last 24 hours.  Current correctional scale  Low  Maintain anti-hyperglycemic dose as follows-   Antihyperglycemics (From admission, onward)    None        Hold Oral hypoglycemics while patient is in the hospital.      VTE Risk Mitigation (From admission, onward)    None               On 03/31/2023, patient should be placed in hospital observation services under my care in collaboration with Dr. Heather Shah MD.      Adeline Hammonds NP  Department of Hospital Medicine  [facility] - Emergency Dept

## 2023-03-31 NOTE — ASSESSMENT & PLAN NOTE
Patient with Paroxysmal (<7 days) atrial fibrillation which is controlled currently with Amiodarone. Patient is currently in sinus rhythm.[id number] Score: 4. Anticoagulation indicated. Anticoagulation done with coumadin.

## 2023-03-31 NOTE — ASSESSMENT & PLAN NOTE
Per chart review pt experienced a fall on 03/17/2023 which resulted in cervical fracture with cord edema and pt underwent cervical spine fusion performed on 03/24/2023 by Dr. Kc. Pt was d/c to skilled Nursing Facility 3 days ago.   Cont PT/OT  Fall precautions

## 2023-03-31 NOTE — SUBJECTIVE & OBJECTIVE
Past Medical History:   Diagnosis Date    Anemia     Anemia of other chronic disease 09/13/2017    Anemia, chronic renal failure 09/13/2017    Anemia, unspecified 09/13/2017    Anticoagulant long-term use     Aorta aneurysm     Arthritis     Atrial fibrillation     Bacteremia due to Streptococcus 01/08/2022    CAD (coronary artery disease)     CHF (congestive heart failure)     Chronic kidney disease     Clotting disorder 09/13/2017    Colon polyp     Dementia     Diabetes mellitus     not on meds    Diabetes mellitus type II     Diverticulosis     Elevated PSA     Encounter for blood transfusion     Former smoker     General anesthetics causing adverse effect in therapeutic use     TROUBLE COMING OUT OF ANESTHESIA WITH GASTRIC BYPASS    GERD (gastroesophageal reflux disease)     Gout     Hx of colonic polyps     Hypercoagulable state     Hyperlipidemia     Hypertension     MI, old 2010    MTHFR mutation     Myocardial infarction     9/2010    Osteomyelitis of ankle or foot 03/09/2017    Prostate cancer 06/2016    Sleep apnea     Squamous cell carcinoma 01/23/2018    Left helix, imiquimod    Venous stasis     Chronic       Past Surgical History:   Procedure Laterality Date    ABDOMINAL SURGERY      CARDIAC SURGERY      3 STENTS     CAUDAL EPIDURAL STEROID INJECTION N/A 6/22/2020    Procedure: INJECTION, STEROID, SPINE, EPIDURAL, CAUDAL;  Surgeon: Evangelist Bose MD;  Location: [facility] OR;  Service: Pain Management;  Laterality: N/A;    COLONOSCOPY  02/2011    diverticulosis with diverticula with clot, no active bleeding    COLONOSCOPY N/A 8/24/2016    Procedure: COLONOSCOPY;  Surgeon: Saroj Borjas MD;  Location: [facility];  Service: Endoscopy;  Laterality: N/A;    COLONOSCOPY N/A 11/18/2020    Procedure: COLONOSCOPY;  Surgeon: Saroj Borjas MD;  Location: [facility];  Service: Endoscopy;  Laterality: N/A;    COLONOSCOPY N/A 10/27/2021    Procedure: COLONOSCOPY;  Surgeon: Saroj Borjas MD;  Location: [facility]  ENDO;  Service: Endoscopy;  Laterality: N/A;    EPIDURAL STEROID INJECTION INTO LUMBAR SPINE N/A 6/22/2020    Procedure: Injection-steroid-epidural-lumbar;  Surgeon: Evangelist Bose MD;  Location: [facility] OR;  Service: Pain Management;  Laterality: N/A;  L3 L4 L5 S1    EPIDURAL STEROID INJECTION INTO LUMBAR SPINE N/A 9/21/2020    Procedure: Injection-steroid-epidural-lumbar;  Surgeon: Evangelist Bose MD;  Location: [facility] OR;  Service: Pain Management;  Laterality: N/A;  L5-S1    FUSION, SPINE, CERVICAL N/A 3/24/2023    Procedure: FUSION, SPINE, CERVICAL;  Surgeon: Kike Kc DO;  Location: [facility] OR;  Service: Neurosurgery;  Laterality: N/A;  posterior cervical decompression/fusion C3-T1    GASTRIC BYPASS  2/5/2008    IVC FILTER RETRIEVAL      JOINT REPLACEMENT  1996 and 2001    bi-lat hip replacement/Rt Hip and Lt Hip    RADIOFREQUENCY ABLATION OF LUMBAR MEDIAL BRANCH NERVE AT SINGLE LEVEL Bilateral 1/10/2020    Procedure: Radiofrequency Ablation, Nerve, Spinal, Lumbar, Medial Branch, 1 Level;  Surgeon: Evangelist Bose MD;  Location: [facility] OR;  Service: Pain Management;  Laterality: Bilateral;  L3,L4,L5    RADIOFREQUENCY ABLATION OF LUMBAR MEDIAL BRANCH NERVE AT SINGLE LEVEL Bilateral 11/23/2021    Procedure: Radiofrequency Ablation, Nerve, Spinal, Lumbar, Medial Branch, Bilateral L 3,4,5;  Surgeon: Nile Causey MD;  Location: [facility] OR;  Service: Pain Management;  Laterality: Bilateral;    ROTATOR CUFF REPAIR      Rt shoulder    Stents  8/18/2010    x 3    UPPER GASTROINTESTINAL ENDOSCOPY  02/2011       Review of patient's allergies indicates:   Allergen Reactions    Donepezil Other (See Comments)     AMS    Bactroban [mupirocin calcium] Blisters     Causes Blisters    Shellfish containing products Other (See Comments)     Other reaction(s): Gout  OYSTERS       Current Facility-Administered Medications on File Prior to Encounter   Medication    lactated ringers infusion     Current Outpatient Medications on File  Prior to Encounter   Medication Sig    allopurinoL (ZYLOPRIM) 100 MG tablet Take 1 tablet (100 mg total) by mouth every evening.    amiodarone (PACERONE) 200 MG Tab Take 1 tablet (200 mg total) by mouth 2 (two) times daily.    aspirin (ECOTRIN) 81 MG EC tablet Take 81 mg by mouth once daily.    atorvastatin (LIPITOR) 80 MG tablet Take 1 tablet (80 mg total) by mouth once daily.    calcitRIOL (ROCALTROL) 0.5 MCG Cap 0.75 mcg once daily.     carvediloL (COREG) 25 MG tablet Take 1 tablet (25 mg total) by mouth 2 (two) times daily with meals.    clopidogreL (PLAVIX) 75 mg tablet Take 1 tablet (75 mg total) by mouth once daily.    famotidine (PEPCID) 40 MG tablet Take 1 tablet (40 mg total) by mouth once daily.    ferrous sulfate (FEROSUL) 325 mg (65 mg iron) Tab tablet TAKE 1 TABLET BY MOUTH TWICE DAILY    fluticasone propionate (FLONASE) 50 mcg/actuation nasal spray SHAKE LIQUID AND USE 2 SPRAYS(100 MCG) IN EACH NOSTRIL EVERY DAY    FOLIC ACID/MULTIVIT-MIN/LUTEIN (CENTRUM SILVER ORAL) Take 1 tablet by mouth once daily.    HYDROcodone-acetaminophen (NORCO) 5-325 mg per tablet Take 1 tablet by mouth every 8 (eight) hours as needed for Pain.    LIDOcaine (LIDODERM) 5 % Place 1 patch onto the skin once daily. Remove & Discard patch within 12 hours or as directed by MD    lisinopriL (PRINIVIL,ZESTRIL) 40 MG tablet Take 1 tablet (40 mg total) by mouth every evening.    magnesium oxide (MAG-OX) 400 mg (241.3 mg magnesium) tablet Take 1 tablet (400 mg total) by mouth once daily.    mecobal-levomefolat Ca-B6 phos (L-METHYL-B6-B12) 3-35-2 mg Tab Take 1 tablet by mouth 2 (two) times daily.    mirabegron (MYRBETRIQ) 50 mg Tb24 Take 1 tablet (50 mg total) by mouth once daily.    nitroGLYCERIN 0.4 MG/DOSE TL SPRY (NITROLINGUAL) 400 mcg/spray spray PLACE 1 SPRAY UNDER THE TONGUE EVERY 5 MINUTES AS NEEDED FOR CHEST PAIN    nystatin (MYCOSTATIN) powder Apply topically 2 (two) times daily.    omeprazole (PRILOSEC) 20 MG capsule Take 1  capsule (20 mg total) by mouth once daily.    prothrombin time/INR test metr Misc 1 Stick by Misc.(Non-Drug; Combo Route) route every 30 days.    traZODone (DESYREL) 50 MG tablet Take 1 tablet (50 mg total) by mouth every evening.    vit A/vit C/vit E/zinc/copper (PRESERVISION AREDS ORAL) Take by mouth 2 (two) times a day.     warfarin (COUMADIN) 5 MG tablet 5 mg except on wednesday and saturday Wed and sat 2.5 mgs (Patient taking differently: Take 2.5 mg by mouth Daily. 5 mg except on wednesday and saturday Wed and sat 2.5 mgs)     Family History       Problem Relation (Age of Onset)    Heart attack Mother, Father, Brother    Lupus Daughter    Ulcerative colitis Daughter (35)          Tobacco Use    Smoking status: Former    Smokeless tobacco: Never    Tobacco comments:     45 yrs ago, only smoked 3-4 packs in his entire life as a teenager   Substance and Sexual Activity    Alcohol use: Not Currently     Comment: rare    Drug use: No    Sexual activity: Not Currently     Review of Systems   Constitutional:  Positive for fatigue. Negative for chills and fever.   HENT:  Negative for congestion, sore throat and trouble swallowing.    Respiratory:  Negative for cough, chest tightness and shortness of breath.    Cardiovascular:  Negative for chest pain, palpitations and leg swelling.   Gastrointestinal:  Negative for abdominal pain, diarrhea, nausea and vomiting.   Genitourinary:  Negative for difficulty urinating, dysuria, frequency and urgency.   Musculoskeletal:  Positive for gait problem. Negative for arthralgias and back pain.   Skin:  Positive for color change and wound. Negative for pallor and rash.   Psychiatric/Behavioral:  Negative for agitation, behavioral problems and confusion.    All other systems reviewed and are negative.  Objective:     Vital Signs (Most Recent):  Temp: 98.2 °F (36.8 °C) (03/31/23 1038)  Pulse: 73 (03/31/23 1132)  Resp: 20 (03/31/23 1132)  BP: (!) 148/71 (03/31/23 1132)  SpO2: 96 %  (03/31/23 1132)   Vital Signs (24h Range):  Temp:  [98.2 °F (36.8 °C)] 98.2 °F (36.8 °C)  Pulse:  [72-73] 73  Resp:  [18-20] 20  SpO2:  [96 %-97 %] 96 %  BP: (143-154)/(67-78) 148/71     Weight: 111.6 kg (246 lb)  Body mass index is 36.33 kg/m².    Physical Exam  Vitals and nursing note reviewed.   Constitutional:       General: He is not in acute distress.     Appearance: He is well-developed. He is ill-appearing (chronically ill appearing). He is not diaphoretic.   HENT:      Head: Normocephalic.      Comments: Significant ecchymosis in various stages to face, large hematoma to forehead, scattered abrasions to face     Right Ear: External ear normal.      Left Ear: External ear normal.      Nose: Nose normal. No congestion or rhinorrhea.      Mouth/Throat:      Mouth: Mucous membranes are moist.      Pharynx: Oropharynx is clear. No oropharyngeal exudate or posterior oropharyngeal erythema.   Eyes:      General: No scleral icterus.     Conjunctiva/sclera: Conjunctivae normal.      Pupils: Pupils are equal, round, and reactive to light.   Neck:      Vascular: No JVD.   Cardiovascular:      Rate and Rhythm: Normal rate and regular rhythm.      Pulses: Normal pulses.      Heart sounds: Normal heart sounds. No murmur heard.  Pulmonary:      Effort: Pulmonary effort is normal. No respiratory distress.      Breath sounds: Normal breath sounds. No stridor. No wheezing, rhonchi or rales.   Abdominal:      General: Bowel sounds are normal. There is no distension.      Palpations: Abdomen is soft.      Tenderness: There is no abdominal tenderness.   Musculoskeletal:         General: Swelling present. No tenderness.      Cervical back: Normal range of motion and neck supple.      Comments: Edema LUE   Skin:     General: Skin is warm and dry.      Capillary Refill: Capillary refill takes 2 to 3 seconds.      Coloration: Skin is not jaundiced or pale.      Findings: Bruising present. No erythema.      Comments: Significant  bruising head to toe with scattered abrasions and skin tears   Neurological:      General: No focal deficit present.      Mental Status: He is alert and oriented to person, place, and time.      Cranial Nerves: No cranial nerve deficit.      Sensory: No sensory deficit.      Motor: Weakness present.      Comments: Chronic generalized weakness to all exts, 2/5 strength   Psychiatric:         Mood and Affect: Mood normal.         Behavior: Behavior normal.         Thought Content: Thought content normal.         CRANIAL NERVES     CN III, IV, VI   Pupils are equal, round, and reactive to light.     Significant Labs: All pertinent labs within the past 24 hours have been reviewed.  CBC:   Recent Labs   Lab 03/31/23  1100   WBC 10.33   HGB 10.1*   HCT 29.7*        CMP:   Recent Labs   Lab 03/31/23  1100      K 4.6      CO2 23   *   BUN 33*   CREATININE 1.2   CALCIUM 10.2   PROT 6.3   ALBUMIN 2.5*   BILITOT 0.9   ALKPHOS 98   AST 42*   ALT 23   ANIONGAP 9       Significant Imaging: I have reviewed all pertinent imaging results/findings within the past 24 hours.

## 2023-03-31 NOTE — ASSESSMENT & PLAN NOTE
Chronic, controlled.  Latest blood pressure and vitals reviewed-   Temp:  [98.2 °F (36.8 °C)]   Pulse:  [72-73]   Resp:  [18-20]   BP: (143-154)/(67-78)   SpO2:  [96 %-97 %] .   Home meds for hypertension were reviewed and noted below.   Hypertension Medications             carvediloL (COREG) 25 MG tablet Take 1 tablet (25 mg total) by mouth 2 (two) times daily with meals.    lisinopriL (PRINIVIL,ZESTRIL) 40 MG tablet Take 1 tablet (40 mg total) by mouth every evening.    nitroGLYCERIN 0.4 MG/DOSE TL SPRY (NITROLINGUAL) 400 mcg/spray spray PLACE 1 SPRAY UNDER THE TONGUE EVERY 5 MINUTES AS NEEDED FOR CHEST PAIN          While in the hospital, will manage blood pressure as follows; Continue home antihypertensive regimen    Will utilize p.r.n. blood pressure medication only if patient's blood pressure greater than  180/110 and he develops symptoms such as worsening chest pain or shortness of breath.

## 2023-03-31 NOTE — ASSESSMENT & PLAN NOTE
Patient's FSGs are controlled on current medication regimen.  Last A1c reviewed-   Lab Results   Component Value Date    LABA1C 6.6 (H) 08/08/2016    HGBA1C 5.1 01/06/2022     Most recent fingerstick glucose reviewed- No results for input(s): POCTGLUCOSE in the last 24 hours.  Current correctional scale  Low  Maintain anti-hyperglycemic dose as follows-   Antihyperglycemics (From admission, onward)    None        Hold Oral hypoglycemics while patient is in the hospital.

## 2023-04-01 PROBLEM — D64.9 ANEMIA: Status: ACTIVE | Noted: 2023-04-01

## 2023-04-01 LAB
ALBUMIN SERPL BCP-MCNC: 2.1 G/DL (ref 3.5–5.2)
ALP SERPL-CCNC: 86 U/L (ref 55–135)
ALT SERPL W/O P-5'-P-CCNC: 19 U/L (ref 10–44)
AMMONIA PLAS-SCNC: 32 UMOL/L (ref 10–50)
ANION GAP SERPL CALC-SCNC: 9 MMOL/L (ref 8–16)
AST SERPL-CCNC: 31 U/L (ref 10–40)
BASOPHILS # BLD AUTO: 0.03 K/UL (ref 0–0.2)
BASOPHILS NFR BLD: 0.3 % (ref 0–1.9)
BILIRUB SERPL-MCNC: 0.7 MG/DL (ref 0.1–1)
BUN SERPL-MCNC: 35 MG/DL (ref 8–23)
CALCIUM SERPL-MCNC: 9 MG/DL (ref 8.7–10.5)
CHLORIDE SERPL-SCNC: 109 MMOL/L (ref 95–110)
CO2 SERPL-SCNC: 21 MMOL/L (ref 23–29)
CREAT SERPL-MCNC: 1.2 MG/DL (ref 0.5–1.4)
CRP SERPL-MCNC: 51.3 MG/L (ref 0–8.2)
DIFFERENTIAL METHOD: ABNORMAL
EOSINOPHIL # BLD AUTO: 0.1 K/UL (ref 0–0.5)
EOSINOPHIL NFR BLD: 1 % (ref 0–8)
ERYTHROCYTE [DISTWIDTH] IN BLOOD BY AUTOMATED COUNT: 15.9 % (ref 11.5–14.5)
ERYTHROCYTE [SEDIMENTATION RATE] IN BLOOD BY WESTERGREN METHOD: 40 MM/HR (ref 0–10)
EST. GFR  (NO RACE VARIABLE): >60 ML/MIN/1.73 M^2
GLUCOSE SERPL-MCNC: 98 MG/DL (ref 70–110)
HCT VFR BLD AUTO: 25.3 % (ref 40–54)
HGB BLD-MCNC: 8.4 G/DL (ref 14–18)
IMM GRANULOCYTES # BLD AUTO: 0.24 K/UL (ref 0–0.04)
IMM GRANULOCYTES NFR BLD AUTO: 2.7 % (ref 0–0.5)
INR PPP: 1.1 (ref 0.8–1.2)
LYMPHOCYTES # BLD AUTO: 0.9 K/UL (ref 1–4.8)
LYMPHOCYTES NFR BLD: 9.9 % (ref 18–48)
MAGNESIUM SERPL-MCNC: 1.8 MG/DL (ref 1.6–2.6)
MCH RBC QN AUTO: 32.8 PG (ref 27–31)
MCHC RBC AUTO-ENTMCNC: 33.2 G/DL (ref 32–36)
MCV RBC AUTO: 99 FL (ref 82–98)
MONOCYTES # BLD AUTO: 0.5 K/UL (ref 0.3–1)
MONOCYTES NFR BLD: 6.2 % (ref 4–15)
NEUTROPHILS # BLD AUTO: 7 K/UL (ref 1.8–7.7)
NEUTROPHILS NFR BLD: 79.9 % (ref 38–73)
NRBC BLD-RTO: 0 /100 WBC
PLATELET # BLD AUTO: 250 K/UL (ref 150–450)
PMV BLD AUTO: 10.3 FL (ref 9.2–12.9)
POCT GLUCOSE: 108 MG/DL (ref 70–110)
POCT GLUCOSE: 120 MG/DL (ref 70–110)
POCT GLUCOSE: 88 MG/DL (ref 70–110)
POCT GLUCOSE: 89 MG/DL (ref 70–110)
POTASSIUM SERPL-SCNC: 4.3 MMOL/L (ref 3.5–5.1)
PROT SERPL-MCNC: 5.1 G/DL (ref 6–8.4)
PROTHROMBIN TIME: 12.6 SEC (ref 9–12.5)
RBC # BLD AUTO: 2.56 M/UL (ref 4.6–6.2)
SODIUM SERPL-SCNC: 139 MMOL/L (ref 136–145)
VIT B12 SERPL-MCNC: >2000 PG/ML (ref 210–950)
WBC # BLD AUTO: 8.75 K/UL (ref 3.9–12.7)

## 2023-04-01 PROCEDURE — 97530 THERAPEUTIC ACTIVITIES: CPT

## 2023-04-01 PROCEDURE — 86140 C-REACTIVE PROTEIN: CPT | Performed by: STUDENT IN AN ORGANIZED HEALTH CARE EDUCATION/TRAINING PROGRAM

## 2023-04-01 PROCEDURE — 86592 SYPHILIS TEST NON-TREP QUAL: CPT | Performed by: STUDENT IN AN ORGANIZED HEALTH CARE EDUCATION/TRAINING PROGRAM

## 2023-04-01 PROCEDURE — 36415 COLL VENOUS BLD VENIPUNCTURE: CPT | Performed by: HOSPITALIST

## 2023-04-01 PROCEDURE — 85025 COMPLETE CBC W/AUTO DIFF WBC: CPT | Performed by: NURSE PRACTITIONER

## 2023-04-01 PROCEDURE — 85651 RBC SED RATE NONAUTOMATED: CPT | Performed by: STUDENT IN AN ORGANIZED HEALTH CARE EDUCATION/TRAINING PROGRAM

## 2023-04-01 PROCEDURE — 82607 VITAMIN B-12: CPT | Performed by: STUDENT IN AN ORGANIZED HEALTH CARE EDUCATION/TRAINING PROGRAM

## 2023-04-01 PROCEDURE — 84425 ASSAY OF VITAMIN B-1: CPT | Performed by: STUDENT IN AN ORGANIZED HEALTH CARE EDUCATION/TRAINING PROGRAM

## 2023-04-01 PROCEDURE — 80053 COMPREHEN METABOLIC PANEL: CPT | Performed by: NURSE PRACTITIONER

## 2023-04-01 PROCEDURE — 83735 ASSAY OF MAGNESIUM: CPT | Performed by: NURSE PRACTITIONER

## 2023-04-01 PROCEDURE — 97161 PT EVAL LOW COMPLEX 20 MIN: CPT

## 2023-04-01 PROCEDURE — 25000003 PHARM REV CODE 250: Performed by: NURSE PRACTITIONER

## 2023-04-01 PROCEDURE — 85610 PROTHROMBIN TIME: CPT | Performed by: HOSPITALIST

## 2023-04-01 PROCEDURE — 82140 ASSAY OF AMMONIA: CPT | Performed by: STUDENT IN AN ORGANIZED HEALTH CARE EDUCATION/TRAINING PROGRAM

## 2023-04-01 PROCEDURE — 97165 OT EVAL LOW COMPLEX 30 MIN: CPT

## 2023-04-01 PROCEDURE — 96361 HYDRATE IV INFUSION ADD-ON: CPT

## 2023-04-01 PROCEDURE — G0378 HOSPITAL OBSERVATION PER HR: HCPCS

## 2023-04-01 PROCEDURE — 94761 N-INVAS EAR/PLS OXIMETRY MLT: CPT

## 2023-04-01 PROCEDURE — 36415 COLL VENOUS BLD VENIPUNCTURE: CPT | Performed by: STUDENT IN AN ORGANIZED HEALTH CARE EDUCATION/TRAINING PROGRAM

## 2023-04-01 RX ORDER — LISINOPRIL 10 MG/1
20 TABLET ORAL NIGHTLY
Status: DISCONTINUED | OUTPATIENT
Start: 2023-04-01 | End: 2023-04-06 | Stop reason: HOSPADM

## 2023-04-01 RX ORDER — QUETIAPINE FUMARATE 25 MG/1
12.5 TABLET, FILM COATED ORAL 2 TIMES DAILY
Status: ON HOLD | COMMUNITY
End: 2023-04-03 | Stop reason: HOSPADM

## 2023-04-01 RX ORDER — CARVEDILOL 6.25 MG/1
12.5 TABLET ORAL 2 TIMES DAILY WITH MEALS
Status: DISCONTINUED | OUTPATIENT
Start: 2023-04-01 | End: 2023-04-01

## 2023-04-01 RX ADMIN — ALLOPURINOL 100 MG: 100 TABLET ORAL at 08:04

## 2023-04-01 RX ADMIN — ASPIRIN 81 MG: 81 TABLET, COATED ORAL at 09:04

## 2023-04-01 RX ADMIN — WARFARIN SODIUM 2.5 MG: 2.5 TABLET ORAL at 06:04

## 2023-04-01 RX ADMIN — MELATONIN TAB 3 MG 6 MG: 3 TAB at 08:04

## 2023-04-01 RX ADMIN — Medication 400 MG: at 09:04

## 2023-04-01 RX ADMIN — Medication 1 TABLET: at 09:04

## 2023-04-01 RX ADMIN — FERROUS SULFATE TAB 325 MG (65 MG ELEMENTAL FE) 1 EACH: 325 (65 FE) TAB at 09:04

## 2023-04-01 RX ADMIN — AMIODARONE HYDROCHLORIDE 200 MG: 100 TABLET ORAL at 09:04

## 2023-04-01 RX ADMIN — FAMOTIDINE 40 MG: 20 TABLET, FILM COATED ORAL at 09:04

## 2023-04-01 RX ADMIN — PANTOPRAZOLE SODIUM 40 MG: 40 TABLET, DELAYED RELEASE ORAL at 09:04

## 2023-04-01 RX ADMIN — FERROUS SULFATE TAB 325 MG (65 MG ELEMENTAL FE) 1 EACH: 325 (65 FE) TAB at 08:04

## 2023-04-01 RX ADMIN — ATORVASTATIN CALCIUM 80 MG: 40 TABLET, FILM COATED ORAL at 09:04

## 2023-04-01 RX ADMIN — CLOPIDOGREL BISULFATE 75 MG: 75 TABLET, FILM COATED ORAL at 09:04

## 2023-04-01 RX ADMIN — LISINOPRIL 20 MG: 10 TABLET ORAL at 08:04

## 2023-04-01 RX ADMIN — CALCITRIOL CAPSULES 0.25 MCG 0.75 MCG: 0.25 CAPSULE ORAL at 09:04

## 2023-04-01 RX ADMIN — OXYBUTYNIN CHLORIDE 5 MG: 5 TABLET, EXTENDED RELEASE ORAL at 09:04

## 2023-04-01 RX ADMIN — AMIODARONE HYDROCHLORIDE 200 MG: 100 TABLET ORAL at 08:04

## 2023-04-01 NOTE — PT/OT/SLP EVAL
Physical Therapy Evaluation    Patient Name:  Richard Bustos   MRN:  [id number]    Recommendations:     Discharge Recommendations: rehabilitation facility, nursing facility, skilled   Discharge Equipment Recommendations: none (TBD at next level of care)     Assessment:     Richard Bustos is a 77 y.o. male admitted with a medical diagnosis of Altered mental status.  He presents with the following impairments/functional limitations: weakness, impaired endurance, impaired balance, impaired functional mobility, decreased lower extremity function, decreased upper extremity function, orthopedic precautions  .    Rehab Prognosis: Fair; patient would benefit from acute skilled PT services to address these deficits and reach maximum level of function.    Recent Surgery: * No surgery found *      Plan:     During this hospitalization, patient to be seen 6 x/week to address the identified rehab impairments via therapeutic activities, therapeutic exercises, neuromuscular re-education and progress toward the following goals:    Plan of Care Expires:  04/15/23    Subjective     Patient/Family Comments/goals: agrees to work with PT, feels dizzy after sitting at eob     Living Environment:  Lived with dgtr in house with ramp to enter  Prior to admission, patients level of function was prior to fall used straight cane.  Equipment used at home: cane, straight.  DME owned (not currently used): none.  Upon discharge, patient will have assistance from family.    Objective:     Communicated with RN (Nicole) prior to session.  Patient found supine with peripheral IV, bed alarm, cervical collar  upon PT entry to room.    General Precautions: Standard, fall  Orthopedic Precautions:spinal precautions   Braces: Aspen collar  Respiratory Status: Room air    Functional Mobility training:  Bed Mobility:     Rolling Right: maximal assistance and of 2 persons  Scooting: maximal assistance, of 2 persons, and to scoot in sitting and up in bed  Supine to  Sit: maximal assistance and of 2 persons  Sit to Supine: maximal assistance and of 2 persons  Transfers: N/T due to safety      AM-PAC 6 CLICK MOBILITY  Total Score:8       Treatment & Education:  Mobility training as above with cues for technique  Sit balance training at eob with mod A and cues    Patient left HOB elevated with all lines intact, call button in reach, bed alarm on, RN (Nicole) present, and Aspen collar in place .  SEATED BP: 83/53 (SANDRINE Rivera present to see reading)    GOALS:   Multidisciplinary Problems       Physical Therapy Goals          Problem: Physical Therapy    Goal Priority Disciplines Outcome Goal Variances Interventions   Physical Therapy Goal     PT, PT/OT Ongoing, Progressing     Description: Goals to be met by: 4/15/2023     Patient will increase functional independence with mobility by performin). Supine to sit with MInimal Assistance  2). Sit to supine with MInimal Assistance  3). Bed to chair transfer with Moderate Assistance   4). Sitting at edge of bed x> 10 minutes with Contact Guard Assistance                         History:     Past Medical History:   Diagnosis Date    Anemia     Anemia of other chronic disease 2017    Anemia, chronic renal failure 2017    Anemia, unspecified 2017    Anticoagulant long-term use     Aorta aneurysm     Arthritis     Atrial fibrillation     Bacteremia due to Streptococcus 2022    CAD (coronary artery disease)     CHF (congestive heart failure)     Chronic kidney disease     Clotting disorder 2017    Colon polyp     Dementia     Diabetes mellitus     not on meds    Diabetes mellitus type II     Diverticulosis     Elevated PSA     Encounter for blood transfusion     Former smoker     General anesthetics causing adverse effect in therapeutic use     TROUBLE COMING OUT OF ANESTHESIA WITH GASTRIC BYPASS    GERD (gastroesophageal reflux disease)     Gout     Hx of colonic polyps     Hypercoagulable state      Hyperlipidemia     Hypertension     MI, old 2010    MTHFR mutation     Myocardial infarction     9/2010    Osteomyelitis of ankle or foot 03/09/2017    Prostate cancer 06/2016    Sleep apnea     Squamous cell carcinoma 01/23/2018    Left helix, imiquimod    Venous stasis     Chronic       Past Surgical History:   Procedure Laterality Date    ABDOMINAL SURGERY      CARDIAC SURGERY      3 STENTS     CAUDAL EPIDURAL STEROID INJECTION N/A 6/22/2020    Procedure: INJECTION, STEROID, SPINE, EPIDURAL, CAUDAL;  Surgeon: Evangelist Bose MD;  Location: [facility] OR;  Service: Pain Management;  Laterality: N/A;    COLONOSCOPY  02/2011    diverticulosis with diverticula with clot, no active bleeding    COLONOSCOPY N/A 8/24/2016    Procedure: COLONOSCOPY;  Surgeon: Saroj Borjas MD;  Location: [facility] ENDO;  Service: Endoscopy;  Laterality: N/A;    COLONOSCOPY N/A 11/18/2020    Procedure: COLONOSCOPY;  Surgeon: Saroj Borjas MD;  Location: [facility] ENDO;  Service: Endoscopy;  Laterality: N/A;    COLONOSCOPY N/A 10/27/2021    Procedure: COLONOSCOPY;  Surgeon: Saroj Borjas MD;  Location: [facility] ENDO;  Service: Endoscopy;  Laterality: N/A;    EPIDURAL STEROID INJECTION INTO LUMBAR SPINE N/A 6/22/2020    Procedure: Injection-steroid-epidural-lumbar;  Surgeon: Evangelist Bose MD;  Location: [facility] OR;  Service: Pain Management;  Laterality: N/A;  L3 L4 L5 S1    EPIDURAL STEROID INJECTION INTO LUMBAR SPINE N/A 9/21/2020    Procedure: Injection-steroid-epidural-lumbar;  Surgeon: Evangelist Bose MD;  Location: [facility] OR;  Service: Pain Management;  Laterality: N/A;  L5-S1    FUSION, SPINE, CERVICAL N/A 3/24/2023    Procedure: FUSION, SPINE, CERVICAL;  Surgeon: Kike Kc DO;  Location: [facility] OR;  Service: Neurosurgery;  Laterality: N/A;  posterior cervical decompression/fusion C3-T1    GASTRIC BYPASS  2/5/2008    IVC FILTER RETRIEVAL      JOINT REPLACEMENT  1996 and 2001    bi-lat hip replacement/Rt Hip and Lt Hip    RADIOFREQUENCY  ABLATION OF LUMBAR MEDIAL BRANCH NERVE AT SINGLE LEVEL Bilateral 1/10/2020    Procedure: Radiofrequency Ablation, Nerve, Spinal, Lumbar, Medial Branch, 1 Level;  Surgeon: Evangelist Bose MD;  Location: [facility] OR;  Service: Pain Management;  Laterality: Bilateral;  L3,L4,L5    RADIOFREQUENCY ABLATION OF LUMBAR MEDIAL BRANCH NERVE AT SINGLE LEVEL Bilateral 11/23/2021    Procedure: Radiofrequency Ablation, Nerve, Spinal, Lumbar, Medial Branch, Bilateral L 3,4,5;  Surgeon: Nile Causey MD;  Location: [facility] OR;  Service: Pain Management;  Laterality: Bilateral;    ROTATOR CUFF REPAIR      Rt shoulder    Stents  8/18/2010    x 3    UPPER GASTROINTESTINAL ENDOSCOPY  02/2011       Time Tracking:     PT Received On: 04/01/23  PT Start Time: 0857     PT Stop Time: 0917  PT Total Time (min): 20 min     Billable Minutes: Evaluation 10 and Therapeutic Activity 10      04/01/2023

## 2023-04-01 NOTE — ASSESSMENT & PLAN NOTE
Chronic, controlled.  Latest blood pressure and vitals reviewed-   Temp:  [96.3 °F (35.7 °C)-98 °F (36.7 °C)]   Pulse:  []   Resp:  [17-20]   BP: (100-157)/(59-89)   SpO2:  [95 %-96 %] .   Home meds for hypertension were reviewed and noted below.   Hypertension Medications             carvediloL (COREG) 25 MG tablet Take 1 tablet (25 mg total) by mouth 2 (two) times daily with meals.    lisinopriL (PRINIVIL,ZESTRIL) 40 MG tablet Take 1 tablet (40 mg total) by mouth every evening.    nitroGLYCERIN 0.4 MG/DOSE TL SPRY (NITROLINGUAL) 400 mcg/spray spray PLACE 1 SPRAY UNDER THE TONGUE EVERY 5 MINUTES AS NEEDED FOR CHEST PAIN          While in the hospital, will manage blood pressure as follows; Continue home antihypertensive regimen    Will utilize p.r.n. blood pressure medication only if patient's blood pressure greater than  180/110 and he develops symptoms such as worsening chest pain or shortness of breath.    4/1 - blood pressure on lower side today, medications adjusted and will continue amiodarone and lisinopril dose decreased to 20 mg nightly.  Will continue monitor closely.  Holding beta-blocker due to mild bradycardia and low blood pressure.

## 2023-04-01 NOTE — NURSING
Nurses Note -- 4 Eyes      4/1/2023   1:08 AM      Skin assessed during: Admit      [] No Pressure Injuries Present    []Prevention Measures Documented      [x] Yes- Altered Skin Integrity Present or Discovered   [x] LDA Added if Not in Epic (Describe Wound)   [] New Altered Skin Integrity was Present on Admit and Documented in LDA   [x] Wound Image Taken    Wound Care Consulted? Yes    Attending Nurse:  Jigna Olsen RN     Second RN/Staff Member:  Kayy Zhang LPN

## 2023-04-01 NOTE — PROGRESS NOTES
"[facility] Medicine  Progress Note    Patient Name: Richard Bustos  MRN: [id number]  Patient Class: OP- Observation   Admission Date: 3/31/2023  Length of Stay: 0 days  Attending Physician: Heather Shah MD  Primary Care Provider: Familia Ramirez Jr, MD        Subjective:     Principal Problem:Altered mental status        HPI:  Richard Bustos is a 77 yr old male with PMHx significant for  PMHx of HTN, CAD s/p cardiac stents, lumbar DDD with chronic bilat LE weakness, HLD, gout, MI, CHF, arthritis, A-fib, on Coumadin, DM II, dementia, and MTHFR mutation who presents to the ED via EMS from [facility].  History obtained by ED physician due to pts underlying dementia. Per ED staff daughter reports after arriving to the correction earlier today, she noticed the patient was "starring off into space" ~20 minutes after a PT session.  EMS states upon their arrival on scene, staff mentioned the patient's respiratory rate dropped to 6 with an associated systolic pressure of 70 mmHg.  EMS reported the nursing home personnel described an episode "resembling a seizure without jerking" prior to EMS arrival.  Pt had  normal CBG en route to the ED.  Upon arrival to ED pt was awake and alert and responding appropriately and bp was stable. Per chart review pt experienced a fall on 03/17/2023 which resulted in cervical fracture with cord edema and pt underwent cervical spine fusion performed on 03/24/2023 by Dr. Kc. Pt was d/c to skilled Nursing Facility 3 days ago.  Patient recalls working with PT this morning.  He reports he received 2 pain pills prior to his PT session and this caused him extreme fatigue and he believes this is the cause of his episode today.  Patient denies any acute complaints at this time.  He reports chronic weakness that is the same as prior to his surgery.  Patient is pleasantly confused but oriented to name, date of birth and year. Family not present at BS. Pt reports he " feels back as baseline. Pt will be admitted to hospital medicine services for further workup and management.      Overview/Hospital Course:  No notes on file    Interval History:  Notes reviewed, no acute events overnight.  Patient resting comfortably in bed.  States he feels better today.  Son present at bedside and discussed episode likely secondary to narcotics administered prior to therapy yesterday.  Patient states that he is not in severe pain would prefer not to take any more opiates as he believes this is causing a lot of his fatigue and weakness.  Will DC all narcotics and advised patient if Tylenol does not alleviate pain to notify me and we will make further adjustments.  Patient verbalized understanding and appreciative of care.  Will continue to monitor overnight to ensure that patient remains stable prior to discharge back to [place].    Review of Systems   Constitutional:  Positive for fatigue. Negative for chills and fever.   HENT:  Negative for congestion, sore throat and trouble swallowing.    Respiratory:  Negative for cough, chest tightness and shortness of breath.    Cardiovascular:  Negative for chest pain, palpitations and leg swelling.   Gastrointestinal:  Negative for abdominal pain, diarrhea, nausea and vomiting.   Genitourinary:  Negative for difficulty urinating, dysuria, frequency and urgency.   Musculoskeletal:  Positive for gait problem. Negative for arthralgias and back pain.   Skin:  Positive for color change and wound. Negative for pallor and rash.   Psychiatric/Behavioral:  Negative for agitation, behavioral problems and confusion.    All other systems reviewed and are negative.  Objective:     Vital Signs (Most Recent):  Temp: 97.8 °F (36.6 °C) (04/01/23 0725)  Pulse: (!) 56 (04/01/23 0748)  Resp: 17 (04/01/23 0748)  BP: (!) 100/59 (04/01/23 0725)  SpO2: 96 % (04/01/23 0748)   Vital Signs (24h Range):  Temp:  [96.3 °F (35.7 °C)-98 °F (36.7 °C)] 97.8 °F (36.6 °C)  Pulse:   [] 56  Resp:  [17-20] 17  SpO2:  [95 %-96 %] 96 %  BP: (100-157)/(59-89) 100/59     Weight: 104 kg (229 lb 4.5 oz)  Body mass index is 33.86 kg/m².    Intake/Output Summary (Last 24 hours) at 4/1/2023 1153  Last data filed at 4/1/2023 1029  Gross per 24 hour   Intake 1207.66 ml   Output 150 ml   Net 1057.66 ml      Physical Exam  Vitals and nursing note reviewed.   Constitutional:       General: He is not in acute distress.     Appearance: He is well-developed. He is ill-appearing (chronically ill appearing). He is not diaphoretic.   HENT:      Head: Normocephalic.      Comments: Significant ecchymosis in various stages to face, large hematoma to forehead, scattered abrasions to face     Right Ear: External ear normal.      Left Ear: External ear normal.      Nose: Nose normal. No congestion or rhinorrhea.      Mouth/Throat:      Mouth: Mucous membranes are moist.      Pharynx: Oropharynx is clear. No oropharyngeal exudate or posterior oropharyngeal erythema.   Eyes:      General: No scleral icterus.     Conjunctiva/sclera: Conjunctivae normal.      Pupils: Pupils are equal, round, and reactive to light.   Neck:      Vascular: No JVD.   Cardiovascular:      Rate and Rhythm: Normal rate and regular rhythm.      Pulses: Normal pulses.      Heart sounds: Normal heart sounds. No murmur heard.  Pulmonary:      Effort: Pulmonary effort is normal. No respiratory distress.      Breath sounds: Normal breath sounds. No stridor. No wheezing, rhonchi or rales.   Abdominal:      General: Bowel sounds are normal. There is no distension.      Palpations: Abdomen is soft.      Tenderness: There is no abdominal tenderness.   Musculoskeletal:         General: Swelling present. No tenderness.      Cervical back: Normal range of motion and neck supple.      Comments: Edema LUE   Skin:     General: Skin is warm and dry.      Capillary Refill: Capillary refill takes 2 to 3 seconds.      Coloration: Skin is not jaundiced or pale.       Findings: Bruising present. No erythema.      Comments: Significant bruising head to toe with scattered abrasions and skin tears   Neurological:      General: No focal deficit present.      Mental Status: He is alert and oriented to person, place, and time.      Cranial Nerves: No cranial nerve deficit.      Sensory: No sensory deficit.      Motor: Weakness present.      Comments: Chronic generalized weakness to all exts, 2/5 strength   Psychiatric:         Mood and Affect: Mood normal.         Behavior: Behavior normal.         Thought Content: Thought content normal.       Significant Labs: All pertinent labs within the past 24 hours have been reviewed.  CBC:   Recent Labs   Lab 03/31/23  1100 04/01/23  0529   WBC 10.33 8.75   HGB 10.1* 8.4*   HCT 29.7* 25.3*    250     CMP:   Recent Labs   Lab 03/31/23  1100 04/01/23  0529    139   K 4.6 4.3    109   CO2 23 21*   * 98   BUN 33* 35*   CREATININE 1.2 1.2   CALCIUM 10.2 9.0   PROT 6.3 5.1*   ALBUMIN 2.5* 2.1*   BILITOT 0.9 0.7   ALKPHOS 98 86   AST 42* 31   ALT 23 19   ANIONGAP 9 9       Significant Imaging: I have reviewed all pertinent imaging results/findings within the past 24 hours.      Assessment/Plan:      * Altered mental status  Pt experienced episode of altered mental status with decreased LOC after receiving 2 pain pills and undergoing physical therapy  Suspect this is etiology of episode   Patient back at baseline currently   Will obtain EEG and consult Neurology   Patient can not go under MRI due to recent cervical instrumentation   CT head reviewed and negative for acute findings    4/1 - patient's mentation stable today.  Suspect episode yesterday was due to narcotics administered.  Will hold all opiates and continue to monitor overnight and if patient remains stable plan to discharge back to [place] tomorrow.        Anemia  Patient's anemia is currently controlled. Has not received any PRBCs to date.. Etiology likely  d/t chronic disease and acute blood loss from surgery.  Current CBC reviewed-   Lab Results   Component Value Date    HGB 8.4 (L) 04/01/2023    HCT 25.3 (L) 04/01/2023     Monitor serial CBC and transfuse if patient becomes hemodynamically unstable, symptomatic or H/H drops below 7/21.     Check iron panel in AM        Closed nondisplaced fracture of sixth cervical vertebra  Per chart review pt experienced a fall on 03/17/2023 which resulted in cervical fracture with cord edema and pt underwent cervical spine fusion performed on 03/24/2023 by Dr. Kc. Pt was d/c to skilled Nursing Facility 3 days ago.   Cont PT/OT  Fall precautions      Generalized weakness  Chronic, pt with increasing deconditioning since fall and cervical fracture  Cont PT/OT  Fall precautions      Paroxysmal atrial fibrillation  Patient with Paroxysmal (<7 days) atrial fibrillation which is controlled currently with Amiodarone. Patient is currently in sinus rhythm.[id number] Score: 4. Anticoagulation indicated. Anticoagulation done with coumadin.        Hyperlipidemia associated with type 2 diabetes mellitus  Chronic, stable  Cont home meds    Hypertension associated with diabetes  Chronic, controlled.  Latest blood pressure and vitals reviewed-   Temp:  [96.3 °F (35.7 °C)-98 °F (36.7 °C)]   Pulse:  []   Resp:  [17-20]   BP: (100-157)/(59-89)   SpO2:  [95 %-96 %] .   Home meds for hypertension were reviewed and noted below.   Hypertension Medications             carvediloL (COREG) 25 MG tablet Take 1 tablet (25 mg total) by mouth 2 (two) times daily with meals.    lisinopriL (PRINIVIL,ZESTRIL) 40 MG tablet Take 1 tablet (40 mg total) by mouth every evening.    nitroGLYCERIN 0.4 MG/DOSE TL SPRY (NITROLINGUAL) 400 mcg/spray spray PLACE 1 SPRAY UNDER THE TONGUE EVERY 5 MINUTES AS NEEDED FOR CHEST PAIN          While in the hospital, will manage blood pressure as follows; Continue home antihypertensive regimen    Will utilize p.r.n. blood  pressure medication only if patient's blood pressure greater than  180/110 and he develops symptoms such as worsening chest pain or shortness of breath.    4/1 - blood pressure on lower side today, medications adjusted and will continue amiodarone and lisinopril dose decreased to 20 mg nightly.  Will continue monitor closely.  Holding beta-blocker due to mild bradycardia and low blood pressure.    MTHFR mutation (methylenetetrahydrofolate reductase)  Cont coumadin  Monitor labs      Diabetes mellitus with chronic kidney disease, stage III  Patient's FSGs are controlled on current medication regimen.  Last A1c reviewed-   Lab Results   Component Value Date    LABA1C 6.6 (H) 08/08/2016    HGBA1C 5.1 01/06/2022     Most recent fingerstick glucose reviewed- No results for input(s): POCTGLUCOSE in the last 24 hours.  Current correctional scale  Low  Maintain anti-hyperglycemic dose as follows-   Antihyperglycemics (From admission, onward)    None        Hold Oral hypoglycemics while patient is in the hospital.      VTE Risk Mitigation (From admission, onward)         Ordered     warfarin (COUMADIN) tablet 2.5 mg  Daily         03/31/23 2030     IP VTE HIGH RISK PATIENT  Once         03/31/23 2030     Place sequential compression device  Until discontinued         03/31/23 2030     Reason for No Pharmacological VTE Prophylaxis  Once        Question:  Reasons:  Answer:  Already adequately anticoagulated on oral Anticoagulants    03/31/23 2030                Discharge Planning   ORACIO:      Code Status: Full Code   Is the patient medically ready for discharge?:     Reason for patient still in hospital (select all that apply): Treatment and Consult recommendations                     Adeline Hammonds NP  Department of Hospital Medicine   [facility]

## 2023-04-01 NOTE — PLAN OF CARE
[facility]  Initial Discharge Assessment       Primary Care Provider: Familia Ramirez Jr, MD    Admission Diagnosis: Dehydration [E86.0]  Altered mental status, unspecified altered mental status type [R41.82]    Admission Date: 3/31/2023  Expected Discharge Date:     Discharge Barriers Identified: None    Payor: [employer]ED MEDICARE / Plan: [employer] SECURE COMPLETE / Product Type: Medicare Advantage /     Extended Emergency Contact Information  Primary Emergency Contact: Citlali Mora  Address: [address] [place] [address] [facility]  Home Phone: [phone]  Mobile Phone: [phone]  Relation: Daughter  Preferred language: English   needed? No  Secondary Emergency Contact: Richard Bustos jr  Address: [address] [place] [id number] [place]  Mobile Phone: [phone]  Relation: Son  Preferred language: English   needed? No    Discharge Plan A: Skilled Nursing Facility  Discharge Plan B: Home Health      [facility] [place] [facility] [address]  [address]  [place] [address]  Phone: [phone] Fax: [phone]    [facility] [place], [place] - [address]  [address] [place] [address]  Phone: [phone] Fax: [phone]      Completed DC assessment with pt's daughter. Citlali verified information on facesheet as correct. Citlali also verified that pt came from [facility]. Reports listed address as mailing. Physical address is [address] [place] [address] in [place]. Reports patient lives at listed address with her. PCP is Dr. Ramirez- reports he saw him last in clinic a few months ago. Pharm is [facility] [address]. Reports being patient's POA. Home DME- hospital bed, wc, rw, cane, bsc, bath bench. Reports normally ambulates with cane- currently cannot ambulate at all. On coumadin- monitored by coumadin clinic and home  coumadin meter. Reports that takes patient home medications as prescribed and able to afford them. Verified insurance on file. Pt recently discharged from this facility so SNF. DC plan is to return to [facility].     Initial Assessment (most recent)       Adult Discharge Assessment - 04/01/23 3336          Discharge Assessment    Assessment Type Discharge Planning Assessment     Confirmed/corrected address, phone number and insurance Yes     Confirmed Demographics Correct on Facesheet     Source of Information family     Does patient/caregiver understand observation status Yes     Communicated ORACIO with patient/caregiver Yes     Reason For Admission AMS     People in Home child(monty), adult     Facility Arrived From: ER     Do you expect to return to your current living situation? No     Do you have help at home or someone to help you manage your care at home? Yes     Prior to hospitilization cognitive status: Alert/Oriented     Walking or Climbing Stairs ambulation difficulty, assistance 1 person     Dressing/Bathing bathing difficulty, assistance 1 person     Equipment Currently Used at Home walker, rolling;wheelchair;hospital bed;cane, straight;bedside commode;bath bench     Readmission within 30 days? Yes     Patient currently being followed by outpatient case management? No     Do you currently have service(s) that help you manage your care at home? No     Do you take prescription medications? Yes     Do you have prescription coverage? Yes     Do you have any problems affording any of your prescribed medications? No     Is the patient taking medications as prescribed? yes     Who is going to help you get home at discharge? [place]     How do you get to doctors appointments? family or friend will provide     Are you on dialysis? No     Do you take coumadin? No     Discharge Plan A Skilled Nursing Facility     Discharge Plan B Home Health     DME Needed Upon Discharge  none     Discharge Plan discussed with:  Adult children     Discharge Barriers Identified None

## 2023-04-01 NOTE — PLAN OF CARE
Goals to be met by: 4/29/23     Patient will increase functional independence with ADLs by performing:    Feeding with Minimal Assistance.  UE Dressing with Moderate Assistance.  Grooming while seated with Minimal Assistance.  Increased functional strength to WFL for participation in ADL's/IADL's.

## 2023-04-01 NOTE — ASSESSMENT & PLAN NOTE
Pt experienced episode of altered mental status with decreased LOC after receiving 2 pain pills and undergoing physical therapy  Suspect this is etiology of episode   Patient back at baseline currently   Will obtain EEG and consult Neurology   Patient can not go under MRI due to recent cervical instrumentation   CT head reviewed and negative for acute findings    4/1 - patient's mentation stable today.  Suspect episode yesterday was due to narcotics administered.  Will hold all opiates and continue to monitor overnight and if patient remains stable plan to discharge back to [facility] tomorrow.

## 2023-04-01 NOTE — PT/OT/SLP EVAL
Occupational Therapy   Evaluation    Name: Richard Bustos  MRN: [id number]  Admitting Diagnosis: Altered mental status  Recent Surgery: * No surgery found *      Recommendations:     Discharge Recommendations: rehabilitation facility, nursing facility, skilled  Discharge Equipment Recommendations:  cane, quad, rollator, bath bench, raised toilet, grab bar  Barriers to discharge:       Assessment:     Richard Bustos is a 77 y.o. male with a medical diagnosis of Altered mental status.  He presents with the following performance deficits affecting function: weakness, impaired endurance, impaired self care skills, impaired functional mobility, gait instability, impaired balance, decreased upper extremity function, decreased lower extremity function, pain, impaired fine motor, impaired coordination, decreased ROM, edema, orthopedic precautions. Pt was agreeable to OT. Pt with Aspen collar in place. Pt R hand dominant. Pt demonstrates approximately 60 degrees AROM R shoulder flexion; Pt demonstrates AROM distal RUE WFL's. Pt is unable to demonstrate L shoulder and elbow flexion; L elbow extension WFL's. Pt demonstrates very minimal L wrist extension, but is able to demonstrate L wrist flexion WFL's. Pt is able to grasp with L hand, but is limited by edema in addition to weakness. Pt required Max assist x 2 with supine to sit EOB and Mod assist to maintain sitting EOB. Pt with mild dizziness in sitting EOB. BP 83/53 Nurse Nicole advised and was present to give meds. Pt required Max assist x 2 for sit to supine and scooting up in bed.OT provided education in calling for assist. Pt verbalized understanding.    Rehab Prognosis: Good; patient would benefit from acute skilled OT services to address these deficits and reach maximum level of function.       Plan:     Patient to be seen 5 x/week to address the above listed problems via self-care/home management, therapeutic exercises, therapeutic activities  Plan of Care Expires:  04/29/23  Plan of Care Reviewed with: patient    Subjective     Chief Complaint: weakness  Patient/Family Comments/goals: To get better    Occupational Profile:  Living Environment: Pt was admitted from [place], but lives with daughter in 1 story home with ramp. Pt has tub/shower with tub transfer bench and grab bars. Pt has raised toilet with grab bars.  Previous level of function: Modified Independent with cane. Min/SBA with bathing  Equipment Used at Home: none  Assistance upon Discharge: Family    Pain/Comfort:  Pain Rating 1: 0/10  Pain Rating Post-Intervention 1: 0/10    Patients cultural, spiritual, Spiritism conflicts given the current situation:      Objective:     Communicated with: Nurse Nicole prior to session.  Patient found HOB elevated with bed alarm, blood pressure cuff upon OT entry to room.    General Precautions: Standard, fall  Orthopedic Precautions: spinal precautions  Braces: Aspen collar  Respiratory Status: Room air    Occupational Performance:    Bed Mobility:    Patient completed Scooting/Bridging with maximal assistance and 2 persons  Patient completed Supine to Sit with maximal assistance and 2 persons  Patient completed Sit to Supine with maximal assistance and 2 persons      Activities of Daily Living:  Feeding:  moderate assistance    Grooming: maximal assistance    Bathing: maximal assistance    Upper Body Dressing: maximal assistance    Lower Body Dressing: maximal assistance    Toileting: total assistance      Cognitive/Visual Perceptual:  Pt alert and was able to follow commands for OT Eval.     Physical Exam:  Upper Extremity Strength:    -       Right Upper Extremity: Pt R hand dominant; Pt demonstrates AROM R shoulder flexion approximately 60 degrees. Pt demonstrates distal RUE AROM WFL's. PROM RUE WFL's  -       Left Upper Extremity: Pt is unable to demonstrate L shoulder and elbow flexion; L elbow extension WFL;s. Pt demonstates minimal L wrist extension. Pt demonstrates L  wrist flexion WFL's. L  limited by edema in addition to weakness. PROM LUE WFL's except more limited by edema L hand.    AMPAC 6 Click ADL:  AMPAC Total Score: 12    Treatment & Education:  OT provided education in role of OT. Pt verbalized understanding and was agreeable to OT.  OT provided instruction in calling for assist. Pt verbalized understanding.    Patient left HOB elevated with all lines intact, call button in reach, bed alarm on, and Nurse Nicole present    GOALS:   Multidisciplinary Problems       Occupational Therapy Goals          Problem: Occupational Therapy    Goal Priority Disciplines Outcome Interventions   Occupational Therapy Goal     OT, PT/OT     Description: Goals to be met by: 4/29/23     Patient will increase functional independence with ADLs by performing:    Feeding with Minimal Assistance.  UE Dressing with Moderate Assistance.  Grooming while seated with Minimal Assistance.  Increased functional strength to WFL for participation in ADL's/IADL's.                         History:     Past Medical History:   Diagnosis Date    Anemia     Anemia of other chronic disease 09/13/2017    Anemia, chronic renal failure 09/13/2017    Anemia, unspecified 09/13/2017    Anticoagulant long-term use     Aorta aneurysm     Arthritis     Atrial fibrillation     Bacteremia due to Streptococcus 01/08/2022    CAD (coronary artery disease)     CHF (congestive heart failure)     Chronic kidney disease     Clotting disorder 09/13/2017    Colon polyp     Dementia     Diabetes mellitus     not on meds    Diabetes mellitus type II     Diverticulosis     Elevated PSA     Encounter for blood transfusion     Former smoker     General anesthetics causing adverse effect in therapeutic use     TROUBLE COMING OUT OF ANESTHESIA WITH GASTRIC BYPASS    GERD (gastroesophageal reflux disease)     Gout     Hx of colonic polyps     Hypercoagulable state     Hyperlipidemia     Hypertension     MI, old 2010    MTHFR mutation      Myocardial infarction     9/2010    Osteomyelitis of ankle or foot 03/09/2017    Prostate cancer 06/2016    Sleep apnea     Squamous cell carcinoma 01/23/2018    Left helix, imiquimod    Venous stasis     Chronic         Past Surgical History:   Procedure Laterality Date    ABDOMINAL SURGERY      CARDIAC SURGERY      3 STENTS     CAUDAL EPIDURAL STEROID INJECTION N/A 6/22/2020    Procedure: INJECTION, STEROID, SPINE, EPIDURAL, CAUDAL;  Surgeon: Evangelist Bose MD;  Location: [facility] OR;  Service: Pain Management;  Laterality: N/A;    COLONOSCOPY  02/2011    diverticulosis with diverticula with clot, no active bleeding    COLONOSCOPY N/A 8/24/2016    Procedure: COLONOSCOPY;  Surgeon: Saroj Borjas MD;  Location: [facility] ENDO;  Service: Endoscopy;  Laterality: N/A;    COLONOSCOPY N/A 11/18/2020    Procedure: COLONOSCOPY;  Surgeon: Saroj Borjas MD;  Location: [facility] ENDO;  Service: Endoscopy;  Laterality: N/A;    COLONOSCOPY N/A 10/27/2021    Procedure: COLONOSCOPY;  Surgeon: Saroj Borjas MD;  Location: [facility] ENDO;  Service: Endoscopy;  Laterality: N/A;    EPIDURAL STEROID INJECTION INTO LUMBAR SPINE N/A 6/22/2020    Procedure: Injection-steroid-epidural-lumbar;  Surgeon: Evangelist Bose MD;  Location: [facility] OR;  Service: Pain Management;  Laterality: N/A;  L3 L4 L5 S1    EPIDURAL STEROID INJECTION INTO LUMBAR SPINE N/A 9/21/2020    Procedure: Injection-steroid-epidural-lumbar;  Surgeon: Evangelist Bose MD;  Location: [facility] OR;  Service: Pain Management;  Laterality: N/A;  L5-S1    FUSION, SPINE, CERVICAL N/A 3/24/2023    Procedure: FUSION, SPINE, CERVICAL;  Surgeon: Kike Kc DO;  Location: [facility] OR;  Service: Neurosurgery;  Laterality: N/A;  posterior cervical decompression/fusion C3-T1    GASTRIC BYPASS  2/5/2008    IVC FILTER RETRIEVAL      JOINT REPLACEMENT  1996 and 2001    bi-lat hip replacement/Rt Hip and Lt Hip    RADIOFREQUENCY ABLATION OF LUMBAR MEDIAL BRANCH NERVE AT SINGLE LEVEL Bilateral  1/10/2020    Procedure: Radiofrequency Ablation, Nerve, Spinal, Lumbar, Medial Branch, 1 Level;  Surgeon: Evangelist Bose MD;  Location: [facility] OR;  Service: Pain Management;  Laterality: Bilateral;  L3,L4,L5    RADIOFREQUENCY ABLATION OF LUMBAR MEDIAL BRANCH NERVE AT SINGLE LEVEL Bilateral 11/23/2021    Procedure: Radiofrequency Ablation, Nerve, Spinal, Lumbar, Medial Branch, Bilateral L 3,4,5;  Surgeon: Nile Causey MD;  Location: [facility] OR;  Service: Pain Management;  Laterality: Bilateral;    ROTATOR CUFF REPAIR      Rt shoulder    Stents  8/18/2010    x 3    UPPER GASTROINTESTINAL ENDOSCOPY  02/2011       Time Tracking:     OT Date of Treatment: 04/01/23  OT Start Time: 0918  OT Stop Time: 0934  OT Total Time (min): 16 min    Billable Minutes:Evaluation 8  Therapeutic Activity 8    4/1/2023

## 2023-04-01 NOTE — ASSESSMENT & PLAN NOTE
Patient with Paroxysmal (<7 days) atrial fibrillation which is controlled currently with Amiodarone. Patient is currently in sinus rhythm.[id number] Score: 4. Anticoagulation indicated. Anticoagulation done with coumadin.

## 2023-04-01 NOTE — PLAN OF CARE
04/01/23 1449   ROSA Message   Medicare Outpatient and Observation Notification regarding financial responsibility Given to patient/caregiver;Explained to patient/caregiver;Signed/date by patient/caregiver   Date ROSA was signed 04/01/23   Time ROSA was signed 1500

## 2023-04-01 NOTE — ASSESSMENT & PLAN NOTE
Patient's anemia is currently controlled. Has not received any PRBCs to date.. Etiology likely d/t chronic disease and acute blood loss from surgery.  Current CBC reviewed-   Lab Results   Component Value Date    HGB 8.4 (L) 04/01/2023    HCT 25.3 (L) 04/01/2023     Monitor serial CBC and transfuse if patient becomes hemodynamically unstable, symptomatic or H/H drops below 7/21.     Check iron panel in AM

## 2023-04-01 NOTE — PLAN OF CARE
Problem: Physical Therapy  Goal: Physical Therapy Goal  Description: Goals to be met by: 4/15/2023     Patient will increase functional independence with mobility by performin). Supine to sit with MInimal Assistance  2). Sit to supine with MInimal Assistance  3). Bed to chair transfer with Moderate Assistance   4). Sitting at edge of bed x> 10 minutes with Contact Guard Assistance    Outcome: Ongoing, Progressing

## 2023-04-01 NOTE — SUBJECTIVE & OBJECTIVE
Interval History:  Notes reviewed, no acute events overnight.  Patient resting comfortably in bed.  States he feels better today.  Son present at bedside and discussed episode likely secondary to narcotics administered prior to therapy yesterday.  Patient states that he is not in severe pain would prefer not to take any more opiates as he believes this is causing a lot of his fatigue and weakness.  Will DC all narcotics and advised patient if Tylenol does not alleviate pain to notify me and we will make further adjustments.  Patient verbalized understanding and appreciative of care.  Will continue to monitor overnight to ensure that patient remains stable prior to discharge back to [place].    Review of Systems   Constitutional:  Positive for fatigue. Negative for chills and fever.   HENT:  Negative for congestion, sore throat and trouble swallowing.    Respiratory:  Negative for cough, chest tightness and shortness of breath.    Cardiovascular:  Negative for chest pain, palpitations and leg swelling.   Gastrointestinal:  Negative for abdominal pain, diarrhea, nausea and vomiting.   Genitourinary:  Negative for difficulty urinating, dysuria, frequency and urgency.   Musculoskeletal:  Positive for gait problem. Negative for arthralgias and back pain.   Skin:  Positive for color change and wound. Negative for pallor and rash.   Psychiatric/Behavioral:  Negative for agitation, behavioral problems and confusion.    All other systems reviewed and are negative.  Objective:     Vital Signs (Most Recent):  Temp: 97.8 °F (36.6 °C) (04/01/23 0725)  Pulse: (!) 56 (04/01/23 0748)  Resp: 17 (04/01/23 0748)  BP: (!) 100/59 (04/01/23 0725)  SpO2: 96 % (04/01/23 0748)   Vital Signs (24h Range):  Temp:  [96.3 °F (35.7 °C)-98 °F (36.7 °C)] 97.8 °F (36.6 °C)  Pulse:  [] 56  Resp:  [17-20] 17  SpO2:  [95 %-96 %] 96 %  BP: (100-157)/(59-89) 100/59     Weight: 104 kg (229 lb 4.5 oz)  Body mass index is 33.86  kg/m².    Intake/Output Summary (Last 24 hours) at 4/1/2023 1153  Last data filed at 4/1/2023 1029  Gross per 24 hour   Intake 1207.66 ml   Output 150 ml   Net 1057.66 ml      Physical Exam  Vitals and nursing note reviewed.   Constitutional:       General: He is not in acute distress.     Appearance: He is well-developed. He is ill-appearing (chronically ill appearing). He is not diaphoretic.   HENT:      Head: Normocephalic.      Comments: Significant ecchymosis in various stages to face, large hematoma to forehead, scattered abrasions to face     Right Ear: External ear normal.      Left Ear: External ear normal.      Nose: Nose normal. No congestion or rhinorrhea.      Mouth/Throat:      Mouth: Mucous membranes are moist.      Pharynx: Oropharynx is clear. No oropharyngeal exudate or posterior oropharyngeal erythema.   Eyes:      General: No scleral icterus.     Conjunctiva/sclera: Conjunctivae normal.      Pupils: Pupils are equal, round, and reactive to light.   Neck:      Vascular: No JVD.   Cardiovascular:      Rate and Rhythm: Normal rate and regular rhythm.      Pulses: Normal pulses.      Heart sounds: Normal heart sounds. No murmur heard.  Pulmonary:      Effort: Pulmonary effort is normal. No respiratory distress.      Breath sounds: Normal breath sounds. No stridor. No wheezing, rhonchi or rales.   Abdominal:      General: Bowel sounds are normal. There is no distension.      Palpations: Abdomen is soft.      Tenderness: There is no abdominal tenderness.   Musculoskeletal:         General: Swelling present. No tenderness.      Cervical back: Normal range of motion and neck supple.      Comments: Edema LUE   Skin:     General: Skin is warm and dry.      Capillary Refill: Capillary refill takes 2 to 3 seconds.      Coloration: Skin is not jaundiced or pale.      Findings: Bruising present. No erythema.      Comments: Significant bruising head to toe with scattered abrasions and skin tears   Neurological:       General: No focal deficit present.      Mental Status: He is alert and oriented to person, place, and time.      Cranial Nerves: No cranial nerve deficit.      Sensory: No sensory deficit.      Motor: Weakness present.      Comments: Chronic generalized weakness to all exts, 2/5 strength   Psychiatric:         Mood and Affect: Mood normal.         Behavior: Behavior normal.         Thought Content: Thought content normal.       Significant Labs: All pertinent labs within the past 24 hours have been reviewed.  CBC:   Recent Labs   Lab 03/31/23  1100 04/01/23  0529   WBC 10.33 8.75   HGB 10.1* 8.4*   HCT 29.7* 25.3*    250     CMP:   Recent Labs   Lab 03/31/23  1100 04/01/23  0529    139   K 4.6 4.3    109   CO2 23 21*   * 98   BUN 33* 35*   CREATININE 1.2 1.2   CALCIUM 10.2 9.0   PROT 6.3 5.1*   ALBUMIN 2.5* 2.1*   BILITOT 0.9 0.7   ALKPHOS 98 86   AST 42* 31   ALT 23 19   ANIONGAP 9 9       Significant Imaging: I have reviewed all pertinent imaging results/findings within the past 24 hours.

## 2023-04-01 NOTE — PLAN OF CARE
Problem: Adult Inpatient Plan of Care  Goal: Plan of Care Review  Outcome: Ongoing, Progressing  Goal: Optimal Comfort and Wellbeing  Outcome: Ongoing, Progressing     Problem: Diabetes Comorbidity  Goal: Blood Glucose Level Within Targeted Range  Outcome: Ongoing, Progressing     Problem: Respiratory Compromise (Pneumonia)  Goal: Effective Oxygenation and Ventilation  Outcome: Ongoing, Progressing     Problem: Impaired Wound Healing  Goal: Optimal Wound Healing  Outcome: Ongoing, Progressing     Problem: Fall Injury Risk  Goal: Absence of Fall and Fall-Related Injury  Outcome: Ongoing, Progressing   POC has been reviewed with pt and children.  Continues on IV fluids.  No c/o pain voiced during shift.  Blood glucose remained within range during shift.  Pt is to have no pain pills except for tylenol per family request.  Coumadin is to be given at 5pm.  Pt is on room air.  No falls during shift.  Pt is to leave C-collar on at all times unless neck is supported in the bed.

## 2023-04-01 NOTE — CARE UPDATE
04/01/23 0748   Patient Assessment/Suction   Level of Consciousness (AVPU) alert   Respiratory Effort Unlabored;Normal   Expansion/Accessory Muscles/Retractions no use of accessory muscles   Rhythm/Pattern, Respiratory pattern regular   PRE-TX-O2   Device (Oxygen Therapy) room air   SpO2 96 %   Pulse Oximetry Type Intermittent   $ Pulse Oximetry - Multiple Charge Pulse Oximetry - Multiple   Pulse (!) 56   Resp 17

## 2023-04-02 LAB
ALBUMIN SERPL BCP-MCNC: 2.2 G/DL (ref 3.5–5.2)
ALP SERPL-CCNC: 86 U/L (ref 55–135)
ALT SERPL W/O P-5'-P-CCNC: 19 U/L (ref 10–44)
ANION GAP SERPL CALC-SCNC: 9 MMOL/L (ref 8–16)
AST SERPL-CCNC: 32 U/L (ref 10–40)
BASOPHILS # BLD AUTO: 0.02 K/UL (ref 0–0.2)
BASOPHILS NFR BLD: 0.1 % (ref 0–1.9)
BILIRUB SERPL-MCNC: 0.7 MG/DL (ref 0.1–1)
BUN SERPL-MCNC: 36 MG/DL (ref 8–23)
CALCIUM SERPL-MCNC: 9.4 MG/DL (ref 8.7–10.5)
CHLORIDE SERPL-SCNC: 108 MMOL/L (ref 95–110)
CO2 SERPL-SCNC: 21 MMOL/L (ref 23–29)
CREAT SERPL-MCNC: 1.3 MG/DL (ref 0.5–1.4)
DIFFERENTIAL METHOD: ABNORMAL
EOSINOPHIL # BLD AUTO: 0.2 K/UL (ref 0–0.5)
EOSINOPHIL NFR BLD: 1.1 % (ref 0–8)
ERYTHROCYTE [DISTWIDTH] IN BLOOD BY AUTOMATED COUNT: 15.8 % (ref 11.5–14.5)
EST. GFR  (NO RACE VARIABLE): 57 ML/MIN/1.73 M^2
FERRITIN SERPL-MCNC: 1924 NG/ML (ref 20–300)
FOLATE SERPL-MCNC: 38 NG/ML (ref 4–24)
GLUCOSE SERPL-MCNC: 93 MG/DL (ref 70–110)
HCT VFR BLD AUTO: 25.7 % (ref 40–54)
HGB BLD-MCNC: 8.7 G/DL (ref 14–18)
IMM GRANULOCYTES # BLD AUTO: 0.15 K/UL (ref 0–0.04)
IMM GRANULOCYTES NFR BLD AUTO: 1.1 % (ref 0–0.5)
INR PPP: 1.1 (ref 0.8–1.2)
IRON SERPL-MCNC: 29 UG/DL (ref 45–160)
LYMPHOCYTES # BLD AUTO: 0.8 K/UL (ref 1–4.8)
LYMPHOCYTES NFR BLD: 5.7 % (ref 18–48)
MAGNESIUM SERPL-MCNC: 1.8 MG/DL (ref 1.6–2.6)
MCH RBC QN AUTO: 33.7 PG (ref 27–31)
MCHC RBC AUTO-ENTMCNC: 33.9 G/DL (ref 32–36)
MCV RBC AUTO: 100 FL (ref 82–98)
MONOCYTES # BLD AUTO: 0.7 K/UL (ref 0.3–1)
MONOCYTES NFR BLD: 5.2 % (ref 4–15)
NEUTROPHILS # BLD AUTO: 12.1 K/UL (ref 1.8–7.7)
NEUTROPHILS NFR BLD: 86.8 % (ref 38–73)
NRBC BLD-RTO: 0 /100 WBC
PLATELET # BLD AUTO: 275 K/UL (ref 150–450)
PMV BLD AUTO: 10.3 FL (ref 9.2–12.9)
POCT GLUCOSE: 124 MG/DL (ref 70–110)
POCT GLUCOSE: 130 MG/DL (ref 70–110)
POCT GLUCOSE: 174 MG/DL (ref 70–110)
POTASSIUM SERPL-SCNC: 4.1 MMOL/L (ref 3.5–5.1)
PROT SERPL-MCNC: 5.3 G/DL (ref 6–8.4)
PROTHROMBIN TIME: 12.4 SEC (ref 9–12.5)
RBC # BLD AUTO: 2.58 M/UL (ref 4.6–6.2)
SATURATED IRON: 22 % (ref 20–50)
SODIUM SERPL-SCNC: 138 MMOL/L (ref 136–145)
TOTAL IRON BINDING CAPACITY: 129 UG/DL (ref 250–450)
TRANSFERRIN SERPL-MCNC: 87 MG/DL (ref 200–375)
TROPONIN I SERPL DL<=0.01 NG/ML-MCNC: 0.1 NG/ML (ref 0–0.03)
VIT B12 SERPL-MCNC: >2000 PG/ML (ref 210–950)
WBC # BLD AUTO: 13.94 K/UL (ref 3.9–12.7)

## 2023-04-02 PROCEDURE — 84466 ASSAY OF TRANSFERRIN: CPT | Performed by: NURSE PRACTITIONER

## 2023-04-02 PROCEDURE — 82746 ASSAY OF FOLIC ACID SERUM: CPT | Performed by: NURSE PRACTITIONER

## 2023-04-02 PROCEDURE — 36415 COLL VENOUS BLD VENIPUNCTURE: CPT | Performed by: NURSE PRACTITIONER

## 2023-04-02 PROCEDURE — 85610 PROTHROMBIN TIME: CPT | Performed by: HOSPITALIST

## 2023-04-02 PROCEDURE — 80053 COMPREHEN METABOLIC PANEL: CPT | Performed by: NURSE PRACTITIONER

## 2023-04-02 PROCEDURE — 84484 ASSAY OF TROPONIN QUANT: CPT | Performed by: NURSE PRACTITIONER

## 2023-04-02 PROCEDURE — G0378 HOSPITAL OBSERVATION PER HR: HCPCS

## 2023-04-02 PROCEDURE — 83735 ASSAY OF MAGNESIUM: CPT | Performed by: NURSE PRACTITIONER

## 2023-04-02 PROCEDURE — 82607 VITAMIN B-12: CPT | Performed by: NURSE PRACTITIONER

## 2023-04-02 PROCEDURE — 94761 N-INVAS EAR/PLS OXIMETRY MLT: CPT

## 2023-04-02 PROCEDURE — 85025 COMPLETE CBC W/AUTO DIFF WBC: CPT | Performed by: NURSE PRACTITIONER

## 2023-04-02 PROCEDURE — 25000003 PHARM REV CODE 250: Performed by: NURSE PRACTITIONER

## 2023-04-02 PROCEDURE — 82728 ASSAY OF FERRITIN: CPT | Performed by: NURSE PRACTITIONER

## 2023-04-02 RX ADMIN — FERROUS SULFATE TAB 325 MG (65 MG ELEMENTAL FE) 1 EACH: 325 (65 FE) TAB at 09:04

## 2023-04-02 RX ADMIN — FAMOTIDINE 40 MG: 20 TABLET, FILM COATED ORAL at 09:04

## 2023-04-02 RX ADMIN — ATORVASTATIN CALCIUM 80 MG: 40 TABLET, FILM COATED ORAL at 09:04

## 2023-04-02 RX ADMIN — WARFARIN SODIUM 2.5 MG: 2.5 TABLET ORAL at 05:04

## 2023-04-02 RX ADMIN — PANTOPRAZOLE SODIUM 40 MG: 40 TABLET, DELAYED RELEASE ORAL at 09:04

## 2023-04-02 RX ADMIN — AMIODARONE HYDROCHLORIDE 200 MG: 100 TABLET ORAL at 09:04

## 2023-04-02 RX ADMIN — CLOPIDOGREL BISULFATE 75 MG: 75 TABLET, FILM COATED ORAL at 09:04

## 2023-04-02 RX ADMIN — CALCITRIOL CAPSULES 0.25 MCG 0.75 MCG: 0.25 CAPSULE ORAL at 09:04

## 2023-04-02 RX ADMIN — LISINOPRIL 20 MG: 10 TABLET ORAL at 09:04

## 2023-04-02 RX ADMIN — Medication 400 MG: at 09:04

## 2023-04-02 RX ADMIN — ALLOPURINOL 100 MG: 100 TABLET ORAL at 09:04

## 2023-04-02 RX ADMIN — ASPIRIN 81 MG: 81 TABLET, COATED ORAL at 09:04

## 2023-04-02 RX ADMIN — Medication 1 TABLET: at 09:04

## 2023-04-02 RX ADMIN — OXYBUTYNIN CHLORIDE 5 MG: 5 TABLET, EXTENDED RELEASE ORAL at 09:04

## 2023-04-02 NOTE — SUBJECTIVE & OBJECTIVE
Interval History:  Notes reviewed, no acute events overnight.  Patient resting comfortably in bed.  Pt states he continues to feel better each day.  He feels strongly that the pain medication he was receiving was causing his severe fatigue and weakness. He is requesting only tylenol for pain control. All narcotics removed from MAR. He is participating well with therapy.  Will continue to monitor closely and plan for discharge back to [facility] tomorrow.  Patient verbalized understanding and appreciative of care.  Family member present at bedside for visit.    Review of Systems   Constitutional:  Positive for fatigue. Negative for chills and fever.   HENT:  Negative for congestion, sore throat and trouble swallowing.    Respiratory:  Negative for cough, chest tightness and shortness of breath.    Cardiovascular:  Negative for chest pain, palpitations and leg swelling.   Gastrointestinal:  Negative for abdominal pain, diarrhea, nausea and vomiting.   Genitourinary:  Negative for difficulty urinating, dysuria, frequency and urgency.   Musculoskeletal:  Positive for gait problem. Negative for arthralgias and back pain.   Skin:  Positive for color change and wound. Negative for pallor and rash.   Psychiatric/Behavioral:  Negative for agitation, behavioral problems and confusion.    All other systems reviewed and are negative.  Objective:     Vital Signs (Most Recent):  Temp: 98.1 °F (36.7 °C) (04/02/23 1152)  Pulse: 65 (04/02/23 1152)  Resp: 18 (04/02/23 1152)  BP: 139/60 (04/02/23 1152)  SpO2: (!) 94 % (04/02/23 1152)   Vital Signs (24h Range):  Temp:  [96.2 °F (35.7 °C)-98.1 °F (36.7 °C)] 98.1 °F (36.7 °C)  Pulse:  [63-72] 65  Resp:  [16-19] 18  SpO2:  [92 %-95 %] 94 %  BP: (104-139)/(56-64) 139/60     Weight: 104 kg (229 lb 4.5 oz)  Body mass index is 33.86 kg/m².    Intake/Output Summary (Last 24 hours) at 4/2/2023 1259  Last data filed at 4/2/2023 1248  Gross per 24 hour   Intake 1403.41 ml   Output --   Net  1403.41 ml        Physical Exam  Vitals and nursing note reviewed.   Constitutional:       General: He is not in acute distress.     Appearance: He is well-developed. He is ill-appearing (chronically ill appearing). He is not diaphoretic.   HENT:      Head: Normocephalic.      Comments: Significant ecchymosis in various stages to face, large hematoma to forehead, scattered abrasions to face     Right Ear: External ear normal.      Left Ear: External ear normal.      Nose: Nose normal. No congestion or rhinorrhea.      Mouth/Throat:      Mouth: Mucous membranes are moist.      Pharynx: Oropharynx is clear. No oropharyngeal exudate or posterior oropharyngeal erythema.   Eyes:      General: No scleral icterus.     Conjunctiva/sclera: Conjunctivae normal.      Pupils: Pupils are equal, round, and reactive to light.   Neck:      Vascular: No JVD.   Cardiovascular:      Rate and Rhythm: Normal rate and regular rhythm.      Pulses: Normal pulses.      Heart sounds: Normal heart sounds. No murmur heard.  Pulmonary:      Effort: Pulmonary effort is normal. No respiratory distress.      Breath sounds: Normal breath sounds. No stridor. No wheezing, rhonchi or rales.   Abdominal:      General: Bowel sounds are normal. There is no distension.      Palpations: Abdomen is soft.      Tenderness: There is no abdominal tenderness.   Musculoskeletal:         General: Swelling present. No tenderness.      Cervical back: Normal range of motion and neck supple.      Comments: Edema LUE   Skin:     General: Skin is warm and dry.      Capillary Refill: Capillary refill takes 2 to 3 seconds.      Coloration: Skin is not jaundiced or pale.      Findings: Bruising present. No erythema.      Comments: Significant bruising head to toe with scattered abrasions and skin tears   Neurological:      General: No focal deficit present.      Mental Status: He is alert and oriented to person, place, and time.      Cranial Nerves: No cranial nerve  deficit.      Sensory: No sensory deficit.      Motor: Weakness present.      Comments: Chronic generalized weakness to all exts, 2/5 strength   Psychiatric:         Mood and Affect: Mood normal.         Behavior: Behavior normal.         Thought Content: Thought content normal.       Significant Labs: All pertinent labs within the past 24 hours have been reviewed.  CBC:   Recent Labs   Lab 04/01/23  0529 04/02/23  0502   WBC 8.75 13.94*   HGB 8.4* 8.7*   HCT 25.3* 25.7*    275       CMP:   Recent Labs   Lab 04/01/23  0529 04/02/23  0502    138   K 4.3 4.1    108   CO2 21* 21*   GLU 98 93   BUN 35* 36*   CREATININE 1.2 1.3   CALCIUM 9.0 9.4   PROT 5.1* 5.3*   ALBUMIN 2.1* 2.2*   BILITOT 0.7 0.7   ALKPHOS 86 86   AST 31 32   ALT 19 19   ANIONGAP 9 9         Significant Imaging: I have reviewed all pertinent imaging results/findings within the past 24 hours.

## 2023-04-02 NOTE — CARE UPDATE
04/01/23 1950   Patient Assessment/Suction   Level of Consciousness (AVPU) alert   Respiratory Effort Unlabored   PRE-TX-O2   Device (Oxygen Therapy) room air   SpO2 95 %   Pulse Oximetry Type Intermittent   $ Pulse Oximetry - Multiple Charge Pulse Oximetry - Multiple   Pulse 67   Resp 18

## 2023-04-02 NOTE — PROGRESS NOTES
"[facility] Medicine  Progress Note    Patient Name: Richard Bustos  MRN: [id number]  Patient Class: OP- Observation   Admission Date: 3/31/2023  Length of Stay: 0 days  Attending Physician: Heather Shah MD  Primary Care Provider: Familia Ramirez Jr, MD        Subjective:     Principal Problem:Altered mental status        HPI:  Richard Bustos is a 77 yr old male with PMHx significant for  PMHx of HTN, CAD s/p cardiac stents, lumbar DDD with chronic bilat LE weakness, HLD, gout, MI, CHF, arthritis, A-fib, on Coumadin, DM II, dementia, and MTHFR mutation who presents to the ED via EMS from [facility].  History obtained by ED physician due to pts underlying dementia. Per ED staff daughter reports after arriving to the residential earlier today, she noticed the patient was "starring off into space" ~20 minutes after a PT session.  EMS states upon their arrival on scene, staff mentioned the patient's respiratory rate dropped to 6 with an associated systolic pressure of 70 mmHg.  EMS reported the nursing home personnel described an episode "resembling a seizure without jerking" prior to EMS arrival.  Pt had  normal CBG en route to the ED.  Upon arrival to ED pt was awake and alert and responding appropriately and bp was stable. Per chart review pt experienced a fall on 03/17/2023 which resulted in cervical fracture with cord edema and pt underwent cervical spine fusion performed on 03/24/2023 by Dr. Kc. Pt was d/c to skilled Nursing Facility 3 days ago.  Patient recalls working with PT this morning.  He reports he received 2 pain pills prior to his PT session and this caused him extreme fatigue and he believes this is the cause of his episode today.  Patient denies any acute complaints at this time.  He reports chronic weakness that is the same as prior to his surgery.  Patient is pleasantly confused but oriented to name, date of birth and year. Family not present at BS. Pt reports he " feels back as baseline. Pt will be admitted to hospital medicine services for further workup and management.      Overview/Hospital Course:  No notes on file    Interval History:  Notes reviewed, no acute events overnight.  Patient resting comfortably in bed.  Pt states he continues to feel better each day.  He feels strongly that the pain medication he was receiving was causing his severe fatigue and weakness. He is requesting only tylenol for pain control. All narcotics removed from MAR. He is participating well with therapy.  Will continue to monitor closely and plan for discharge back to [facility] tomorrow.  Patient verbalized understanding and appreciative of care.  Family member present at bedside for visit.    Review of Systems   Constitutional:  Positive for fatigue. Negative for chills and fever.   HENT:  Negative for congestion, sore throat and trouble swallowing.    Respiratory:  Negative for cough, chest tightness and shortness of breath.    Cardiovascular:  Negative for chest pain, palpitations and leg swelling.   Gastrointestinal:  Negative for abdominal pain, diarrhea, nausea and vomiting.   Genitourinary:  Negative for difficulty urinating, dysuria, frequency and urgency.   Musculoskeletal:  Positive for gait problem. Negative for arthralgias and back pain.   Skin:  Positive for color change and wound. Negative for pallor and rash.   Psychiatric/Behavioral:  Negative for agitation, behavioral problems and confusion.    All other systems reviewed and are negative.  Objective:     Vital Signs (Most Recent):  Temp: 98.1 °F (36.7 °C) (04/02/23 1152)  Pulse: 65 (04/02/23 1152)  Resp: 18 (04/02/23 1152)  BP: 139/60 (04/02/23 1152)  SpO2: (!) 94 % (04/02/23 1152)   Vital Signs (24h Range):  Temp:  [96.2 °F (35.7 °C)-98.1 °F (36.7 °C)] 98.1 °F (36.7 °C)  Pulse:  [63-72] 65  Resp:  [16-19] 18  SpO2:  [92 %-95 %] 94 %  BP: (104-139)/(56-64) 139/60     Weight: 104 kg (229 lb 4.5 oz)  Body mass index is  33.86 kg/m².    Intake/Output Summary (Last 24 hours) at 4/2/2023 1259  Last data filed at 4/2/2023 1248  Gross per 24 hour   Intake 1403.41 ml   Output --   Net 1403.41 ml        Physical Exam  Vitals and nursing note reviewed.   Constitutional:       General: He is not in acute distress.     Appearance: He is well-developed. He is ill-appearing (chronically ill appearing). He is not diaphoretic.   HENT:      Head: Normocephalic.      Comments: Significant ecchymosis in various stages to face, large hematoma to forehead, scattered abrasions to face     Right Ear: External ear normal.      Left Ear: External ear normal.      Nose: Nose normal. No congestion or rhinorrhea.      Mouth/Throat:      Mouth: Mucous membranes are moist.      Pharynx: Oropharynx is clear. No oropharyngeal exudate or posterior oropharyngeal erythema.   Eyes:      General: No scleral icterus.     Conjunctiva/sclera: Conjunctivae normal.      Pupils: Pupils are equal, round, and reactive to light.   Neck:      Vascular: No JVD.   Cardiovascular:      Rate and Rhythm: Normal rate and regular rhythm.      Pulses: Normal pulses.      Heart sounds: Normal heart sounds. No murmur heard.  Pulmonary:      Effort: Pulmonary effort is normal. No respiratory distress.      Breath sounds: Normal breath sounds. No stridor. No wheezing, rhonchi or rales.   Abdominal:      General: Bowel sounds are normal. There is no distension.      Palpations: Abdomen is soft.      Tenderness: There is no abdominal tenderness.   Musculoskeletal:         General: Swelling present. No tenderness.      Cervical back: Normal range of motion and neck supple.      Comments: Edema LUE   Skin:     General: Skin is warm and dry.      Capillary Refill: Capillary refill takes 2 to 3 seconds.      Coloration: Skin is not jaundiced or pale.      Findings: Bruising present. No erythema.      Comments: Significant bruising head to toe with scattered abrasions and skin tears    Neurological:      General: No focal deficit present.      Mental Status: He is alert and oriented to person, place, and time.      Cranial Nerves: No cranial nerve deficit.      Sensory: No sensory deficit.      Motor: Weakness present.      Comments: Chronic generalized weakness to all exts, 2/5 strength   Psychiatric:         Mood and Affect: Mood normal.         Behavior: Behavior normal.         Thought Content: Thought content normal.       Significant Labs: All pertinent labs within the past 24 hours have been reviewed.  CBC:   Recent Labs   Lab 04/01/23  0529 04/02/23  0502   WBC 8.75 13.94*   HGB 8.4* 8.7*   HCT 25.3* 25.7*    275       CMP:   Recent Labs   Lab 04/01/23  0529 04/02/23  0502    138   K 4.3 4.1    108   CO2 21* 21*   GLU 98 93   BUN 35* 36*   CREATININE 1.2 1.3   CALCIUM 9.0 9.4   PROT 5.1* 5.3*   ALBUMIN 2.1* 2.2*   BILITOT 0.7 0.7   ALKPHOS 86 86   AST 31 32   ALT 19 19   ANIONGAP 9 9         Significant Imaging: I have reviewed all pertinent imaging results/findings within the past 24 hours.      Assessment/Plan:      * Altered mental status  Pt experienced episode of altered mental status with decreased LOC after receiving 2 pain pills and undergoing physical therapy  Suspect this is etiology of episode   Patient back at baseline currently   Will obtain EEG and consult Neurology   Patient can not go under MRI due to recent cervical instrumentation   CT head reviewed and negative for acute findings    4/1 - patient's mentation stable today.  Suspect episode yesterday was due to narcotics administered.  Will hold all opiates and continue to monitor overnight and if patient remains stable plan to discharge back to [place] tomorrow.    4/2 -pts mentation remains much improved and stable today. Will cont to monitor closely and plan to d/c back to [facility] tomorrow.         Anemia  Patient's anemia is currently controlled. Has not received any PRBCs to date..  Etiology likely d/t chronic disease and acute blood loss from surgery.  Current CBC reviewed-   Lab Results   Component Value Date    HGB 8.4 (L) 04/01/2023    HCT 25.3 (L) 04/01/2023     Monitor serial CBC and transfuse if patient becomes hemodynamically unstable, symptomatic or H/H drops below 7/21.     Check iron panel in AM        Closed nondisplaced fracture of sixth cervical vertebra  Per chart review pt experienced a fall on 03/17/2023 which resulted in cervical fracture with cord edema and pt underwent cervical spine fusion performed on 03/24/2023 by Dr. Kc. Pt was d/c to skilled Nursing Facility 3 days ago.   Cont PT/OT  Fall precautions      Generalized weakness  Chronic, pt with increasing deconditioning since fall and cervical fracture  Cont PT/OT  Fall precautions      Paroxysmal atrial fibrillation  Patient with Paroxysmal (<7 days) atrial fibrillation which is controlled currently with Amiodarone. Patient is currently in sinus rhythm.[id number] Score: 4. Anticoagulation indicated. Anticoagulation done with coumadin.        Hyperlipidemia associated with type 2 diabetes mellitus  Chronic, stable  Cont home meds    Hypertension associated with diabetes  Chronic, controlled.  Latest blood pressure and vitals reviewed-   Temp:  [96.3 °F (35.7 °C)-98 °F (36.7 °C)]   Pulse:  []   Resp:  [17-20]   BP: (100-157)/(59-89)   SpO2:  [95 %-96 %] .   Home meds for hypertension were reviewed and noted below.   Hypertension Medications             carvediloL (COREG) 25 MG tablet Take 1 tablet (25 mg total) by mouth 2 (two) times daily with meals.    lisinopriL (PRINIVIL,ZESTRIL) 40 MG tablet Take 1 tablet (40 mg total) by mouth every evening.    nitroGLYCERIN 0.4 MG/DOSE TL SPRY (NITROLINGUAL) 400 mcg/spray spray PLACE 1 SPRAY UNDER THE TONGUE EVERY 5 MINUTES AS NEEDED FOR CHEST PAIN          While in the hospital, will manage blood pressure as follows; Continue home antihypertensive regimen    Will utilize  p.r.n. blood pressure medication only if patient's blood pressure greater than  180/110 and he develops symptoms such as worsening chest pain or shortness of breath.    4/1 - blood pressure on lower side today, medications adjusted and will continue amiodarone and lisinopril dose decreased to 20 mg nightly.  Will continue monitor closely.  Holding beta-blocker due to mild bradycardia and low blood pressure.    MTHFR mutation (methylenetetrahydrofolate reductase)  Cont coumadin  Monitor labs      Diabetes mellitus with chronic kidney disease, stage III  Patient's FSGs are controlled on current medication regimen.  Last A1c reviewed-   Lab Results   Component Value Date    LABA1C 6.6 (H) 08/08/2016    HGBA1C 5.1 01/06/2022     Most recent fingerstick glucose reviewed- No results for input(s): POCTGLUCOSE in the last 24 hours.  Current correctional scale  Low  Maintain anti-hyperglycemic dose as follows-   Antihyperglycemics (From admission, onward)    None        Hold Oral hypoglycemics while patient is in the hospital.      VTE Risk Mitigation (From admission, onward)         Ordered     warfarin (COUMADIN) tablet 2.5 mg  Daily         03/31/23 2030     IP VTE HIGH RISK PATIENT  Once         03/31/23 2030     Place sequential compression device  Until discontinued         03/31/23 2030     Reason for No Pharmacological VTE Prophylaxis  Once        Question:  Reasons:  Answer:  Already adequately anticoagulated on oral Anticoagulants    03/31/23 2030                Discharge Planning   ORACIO:      Code Status: Full Code   Is the patient medically ready for discharge?:     Reason for patient still in hospital (select all that apply): Patient trending condition and Treatment  Discharge Plan A: Skilled Nursing Facility                  Adeline Hammonds NP  Department of Hospital Medicine   [facility]

## 2023-04-02 NOTE — PLAN OF CARE
Submitted to [facility] for SNF auth. Plan to return to [facility] after auth has been obtained       04/02/23 1326   Post-Acute Status   Post-Acute Authorization Placement   Post-Acute Placement Status Pending payor review/awaiting authorization (if required)

## 2023-04-02 NOTE — ASSESSMENT & PLAN NOTE
Pt experienced episode of altered mental status with decreased LOC after receiving 2 pain pills and undergoing physical therapy  Suspect this is etiology of episode   Patient back at baseline currently   Will obtain EEG and consult Neurology   Patient can not go under MRI due to recent cervical instrumentation   CT head reviewed and negative for acute findings    4/1 - patient's mentation stable today.  Suspect episode yesterday was due to narcotics administered.  Will hold all opiates and continue to monitor overnight and if patient remains stable plan to discharge back to [facility] tomorrow.    4/2 -pts mentation remains much improved and stable today. Will cont to monitor closely and plan to d/c back to [facility] tomorrow.

## 2023-04-02 NOTE — PLAN OF CARE
Problem: Adult Inpatient Plan of Care  Goal: Plan of Care Review  Outcome: Ongoing, Progressing  Goal: Optimal Comfort and Wellbeing  Outcome: Ongoing, Progressing     Problem: Diabetes Comorbidity  Goal: Blood Glucose Level Within Targeted Range  Outcome: Ongoing, Progressing     Problem: Infection (Pneumonia)  Goal: Resolution of Infection Signs and Symptoms  Outcome: Ongoing, Progressing     Problem: Respiratory Compromise (Pneumonia)  Goal: Effective Oxygenation and Ventilation  Outcome: Ongoing, Progressing     Problem: Fall Injury Risk  Goal: Absence of Fall and Fall-Related Injury  Outcome: Ongoing, Progressing     Problem: Skin Injury Risk Increased  Goal: Skin Health and Integrity  Outcome: Ongoing, Progressing   POC has been reviewed with pt.  No c/o pain voiced during shift.  No falls during shift.  Pt's WBC count was elevated this am possibly d/t new infection.  Pt oxygen 96 on RA.  Blood glucose remained within targeted range.  Continues on IV fluids.

## 2023-04-03 LAB
ALBUMIN SERPL BCP-MCNC: 2.1 G/DL (ref 3.5–5.2)
ALP SERPL-CCNC: 78 U/L (ref 55–135)
ALT SERPL W/O P-5'-P-CCNC: 20 U/L (ref 10–44)
ANION GAP SERPL CALC-SCNC: 8 MMOL/L (ref 8–16)
AST SERPL-CCNC: 30 U/L (ref 10–40)
BASOPHILS # BLD AUTO: 0.02 K/UL (ref 0–0.2)
BASOPHILS NFR BLD: 0.2 % (ref 0–1.9)
BILIRUB SERPL-MCNC: 0.7 MG/DL (ref 0.1–1)
BUN SERPL-MCNC: 33 MG/DL (ref 8–23)
CALCIUM SERPL-MCNC: 9.1 MG/DL (ref 8.7–10.5)
CHLORIDE SERPL-SCNC: 111 MMOL/L (ref 95–110)
CO2 SERPL-SCNC: 22 MMOL/L (ref 23–29)
CREAT SERPL-MCNC: 1.4 MG/DL (ref 0.5–1.4)
DIFFERENTIAL METHOD: ABNORMAL
EOSINOPHIL # BLD AUTO: 0.2 K/UL (ref 0–0.5)
EOSINOPHIL NFR BLD: 2.1 % (ref 0–8)
ERYTHROCYTE [DISTWIDTH] IN BLOOD BY AUTOMATED COUNT: 15.7 % (ref 11.5–14.5)
EST. GFR  (NO RACE VARIABLE): 52 ML/MIN/1.73 M^2
GLUCOSE SERPL-MCNC: 115 MG/DL (ref 70–110)
HCT VFR BLD AUTO: 24.1 % (ref 40–54)
HGB BLD-MCNC: 8.2 G/DL (ref 14–18)
IMM GRANULOCYTES # BLD AUTO: 0.18 K/UL (ref 0–0.04)
IMM GRANULOCYTES NFR BLD AUTO: 2 % (ref 0–0.5)
INR PPP: 1.1 (ref 0.8–1.2)
LYMPHOCYTES # BLD AUTO: 1.1 K/UL (ref 1–4.8)
LYMPHOCYTES NFR BLD: 12.4 % (ref 18–48)
MAGNESIUM SERPL-MCNC: 1.7 MG/DL (ref 1.6–2.6)
MCH RBC QN AUTO: 33.7 PG (ref 27–31)
MCHC RBC AUTO-ENTMCNC: 34 G/DL (ref 32–36)
MCV RBC AUTO: 99 FL (ref 82–98)
MONOCYTES # BLD AUTO: 0.5 K/UL (ref 0.3–1)
MONOCYTES NFR BLD: 5.2 % (ref 4–15)
NEUTROPHILS # BLD AUTO: 6.9 K/UL (ref 1.8–7.7)
NEUTROPHILS NFR BLD: 78.1 % (ref 38–73)
NRBC BLD-RTO: 0 /100 WBC
PLATELET # BLD AUTO: 278 K/UL (ref 150–450)
PMV BLD AUTO: 10.3 FL (ref 9.2–12.9)
POCT GLUCOSE: 101 MG/DL (ref 70–110)
POCT GLUCOSE: 123 MG/DL (ref 70–110)
POCT GLUCOSE: 127 MG/DL (ref 70–110)
POCT GLUCOSE: 128 MG/DL (ref 70–110)
POTASSIUM SERPL-SCNC: 3.8 MMOL/L (ref 3.5–5.1)
PROT SERPL-MCNC: 5 G/DL (ref 6–8.4)
PROTHROMBIN TIME: 12.7 SEC (ref 9–12.5)
RBC # BLD AUTO: 2.43 M/UL (ref 4.6–6.2)
RPR SER QL: NORMAL
SODIUM SERPL-SCNC: 141 MMOL/L (ref 136–145)
WBC # BLD AUTO: 8.85 K/UL (ref 3.9–12.7)

## 2023-04-03 PROCEDURE — 97112 NEUROMUSCULAR REEDUCATION: CPT

## 2023-04-03 PROCEDURE — 97110 THERAPEUTIC EXERCISES: CPT

## 2023-04-03 PROCEDURE — 36415 COLL VENOUS BLD VENIPUNCTURE: CPT | Performed by: HOSPITALIST

## 2023-04-03 PROCEDURE — 99222 1ST HOSP IP/OBS MODERATE 55: CPT | Mod: ,,, | Performed by: FAMILY MEDICINE

## 2023-04-03 PROCEDURE — 85025 COMPLETE CBC W/AUTO DIFF WBC: CPT | Performed by: NURSE PRACTITIONER

## 2023-04-03 PROCEDURE — 85610 PROTHROMBIN TIME: CPT | Performed by: HOSPITALIST

## 2023-04-03 PROCEDURE — 80053 COMPREHEN METABOLIC PANEL: CPT | Performed by: NURSE PRACTITIONER

## 2023-04-03 PROCEDURE — 25000003 PHARM REV CODE 250: Performed by: NURSE PRACTITIONER

## 2023-04-03 PROCEDURE — 25000242 PHARM REV CODE 250 ALT 637 W/ HCPCS: Performed by: NURSE PRACTITIONER

## 2023-04-03 PROCEDURE — G0378 HOSPITAL OBSERVATION PER HR: HCPCS

## 2023-04-03 PROCEDURE — 99222 PR INITIAL HOSPITAL CARE,LEVL II: ICD-10-PCS | Mod: ,,, | Performed by: FAMILY MEDICINE

## 2023-04-03 PROCEDURE — 94761 N-INVAS EAR/PLS OXIMETRY MLT: CPT

## 2023-04-03 PROCEDURE — 83735 ASSAY OF MAGNESIUM: CPT | Performed by: NURSE PRACTITIONER

## 2023-04-03 RX ORDER — WARFARIN SODIUM 5 MG/1
5 TABLET ORAL DAILY
Qty: 90 TABLET | Refills: 0 | Status: ON HOLD | OUTPATIENT
Start: 2023-04-03 | End: 2023-07-21 | Stop reason: HOSPADM

## 2023-04-03 RX ORDER — WARFARIN SODIUM 5 MG/1
5 TABLET ORAL DAILY
Status: DISCONTINUED | OUTPATIENT
Start: 2023-04-03 | End: 2023-04-06 | Stop reason: HOSPADM

## 2023-04-03 RX ORDER — LISINOPRIL 20 MG/1
20 TABLET ORAL NIGHTLY
Qty: 30 TABLET | Refills: 1 | Status: ON HOLD | OUTPATIENT
Start: 2023-04-03 | End: 2023-04-28 | Stop reason: HOSPADM

## 2023-04-03 RX ORDER — CALCITRIOL 0.5 UG/1
0.5 CAPSULE ORAL DAILY
Qty: 30 CAPSULE | Refills: 4 | Status: SHIPPED | OUTPATIENT
Start: 2023-04-03

## 2023-04-03 RX ADMIN — PANTOPRAZOLE SODIUM 40 MG: 40 TABLET, DELAYED RELEASE ORAL at 09:04

## 2023-04-03 RX ADMIN — LISINOPRIL 20 MG: 10 TABLET ORAL at 08:04

## 2023-04-03 RX ADMIN — ATORVASTATIN CALCIUM 80 MG: 40 TABLET, FILM COATED ORAL at 09:04

## 2023-04-03 RX ADMIN — Medication 400 MG: at 09:04

## 2023-04-03 RX ADMIN — WARFARIN SODIUM 5 MG: 5 TABLET ORAL at 06:04

## 2023-04-03 RX ADMIN — AMIODARONE HYDROCHLORIDE 200 MG: 100 TABLET ORAL at 08:04

## 2023-04-03 RX ADMIN — ALLOPURINOL 100 MG: 100 TABLET ORAL at 08:04

## 2023-04-03 RX ADMIN — Medication 1 TABLET: at 09:04

## 2023-04-03 RX ADMIN — FAMOTIDINE 40 MG: 20 TABLET, FILM COATED ORAL at 09:04

## 2023-04-03 RX ADMIN — AMIODARONE HYDROCHLORIDE 200 MG: 100 TABLET ORAL at 09:04

## 2023-04-03 RX ADMIN — CLOPIDOGREL BISULFATE 75 MG: 75 TABLET, FILM COATED ORAL at 09:04

## 2023-04-03 RX ADMIN — FLUTICASONE PROPIONATE 100 MCG: 50 SPRAY, METERED NASAL at 09:04

## 2023-04-03 RX ADMIN — MELATONIN TAB 3 MG 6 MG: 3 TAB at 08:04

## 2023-04-03 RX ADMIN — FERROUS SULFATE TAB 325 MG (65 MG ELEMENTAL FE) 1 EACH: 325 (65 FE) TAB at 08:04

## 2023-04-03 RX ADMIN — OXYBUTYNIN CHLORIDE 5 MG: 5 TABLET, EXTENDED RELEASE ORAL at 09:04

## 2023-04-03 RX ADMIN — ASPIRIN 81 MG: 81 TABLET, COATED ORAL at 09:04

## 2023-04-03 RX ADMIN — FERROUS SULFATE TAB 325 MG (65 MG ELEMENTAL FE) 1 EACH: 325 (65 FE) TAB at 09:04

## 2023-04-03 RX ADMIN — CALCITRIOL CAPSULES 0.25 MCG 0.75 MCG: 0.25 CAPSULE ORAL at 09:04

## 2023-04-03 NOTE — DISCHARGE INSTRUCTIONS
[facility] Facility Transfer Orders        Admit to: [facility]    Diagnoses:   Active Hospital Problems    Diagnosis  POA    Anemia [D64.9]  Yes    Closed nondisplaced fracture of sixth cervical vertebra [S12.501A]  Yes    Dementia without behavioral disturbance [F03.90]  Yes    Warfarin anticoagulation [Z79.01]  Not Applicable    OAB (overactive bladder) [N32.81]  Yes    Chronic systolic congestive heart failure [I50.22]  Yes    Generalized weakness [R53.1]  Yes    Paroxysmal atrial fibrillation [I48.0]  Yes     EKG stable  Stable on Coreg, no missed doses of anticoagulation. Continue anticoagulation as described below        CKD (chronic kidney disease) stage 3, GFR 30-59 ml/min, 41 [N18.30]  Yes    Obesity (BMI 30-39.9) [E66.9]  Yes    Diabetes mellitus with chronic kidney disease, stage III [E11.22]  Yes     Chronic    MTHFR mutation (methylenetetrahydrofolate reductase) [Z15.89]  Not Applicable    Hypertension associated with diabetes [E11.59, I15.2]  Yes    Hyperlipidemia associated with type 2 diabetes mellitus [E11.69, E78.5]  Yes     Stable on Atorvastatin 80 mg QD  Last lipid panel excellent  As now        Gout [M10.9]  Yes    GERD (gastroesophageal reflux disease) [K21.9]  Yes      Resolved Hospital Problems    Diagnosis Date Resolved POA    *Altered mental status [R41.82] 04/05/2023 Yes     Allergies:   Review of patient's allergies indicates:   Allergen Reactions    Donepezil Other (See Comments)     AMS    Bactroban [mupirocin calcium] Blisters     Causes Blisters    Shellfish containing products Other (See Comments)     Other reaction(s): Gout  OYSTERS       Code Status: Full code    Vitals: Routine       Diet: cardiac diet    Activity: Activity as tolerated    Nursing Precautions: Fall and Pressure ulcer prevention    Bed/Surface: Low Air Loss    Consults: PT to evaluate and treat- 5 times a week and OT to evaluate and treat- 5 times a week    Oxygen: room air    Dialysis:  Patient is not on dialysis.     Labs: First blood draw on 4/5/23.             CBC and INR 4/5/23, INR every 3 days until coumadin level therapeutic and then further lab draws per hematologist  Pending Diagnostic Studies:       Procedure Component Value Units Date/Time    Fl Modified Barium Swallow Speech [[id number]] Resulted: 04/06/23 1006    Order Status: Sent Lab Status: In process Updated: 04/06/23 1006          Imaging: none       Medications: Discontinue all previous medication orders, if any. See new list below.  Current Discharge Medication List        CONTINUE these medications which have CHANGED    Details   calcitRIOL (ROCALTROL) 0.5 MCG Cap Take 1 capsule (0.5 mcg total) by mouth once daily.  Qty: 30 capsule, Refills: 4      lisinopriL (PRINIVIL,ZESTRIL) 20 MG tablet Take 1 tablet (20 mg total) by mouth every evening.  Qty: 30 tablet, Refills: 1    Comments: .  Associated Diagnoses: LV dysfunction; Chronic systolic congestive heart failure; Essential hypertension; Stage 3b chronic kidney disease      !! warfarin (COUMADIN) 5 MG tablet Take 1 tablet (5 mg total) by mouth Daily.  Qty: 90 tablet, Refills: 0    Associated Diagnoses: Paroxysmal atrial fibrillation; MTHFR mutation (methylenetetrahydrofolate reductase)      !! warfarin (COUMADIN) 5 MG tablet Take 1 tablet (5 mg total) by mouth Daily.  Qty: 30 tablet, Refills: 11       !! - Potential duplicate medications found. Please discuss with provider.        CONTINUE these medications which have NOT CHANGED    Details   allopurinoL (ZYLOPRIM) 100 MG tablet Take 1 tablet (100 mg total) by mouth every evening.  Qty: 90 tablet, Refills: 3    Associated Diagnoses: Acute gout of foot, unspecified cause, unspecified laterality      amiodarone (PACERONE) 200 MG Tab Take 1 tablet (200 mg total) by mouth 2 (two) times daily.  Qty: 60 tablet, Refills: 11      aspirin (ECOTRIN) 81 MG EC tablet Take 81 mg by mouth once daily.      atorvastatin (LIPITOR) 80 MG tablet  Take 1 tablet (80 mg total) by mouth once daily.  Qty: 90 tablet, Refills: 3    Associated Diagnoses: Mixed hyperlipidemia      clopidogreL (PLAVIX) 75 mg tablet Take 1 tablet (75 mg total) by mouth once daily.  Qty: 90 tablet, Refills: 3    Associated Diagnoses: Coronary artery disease involving native coronary artery of native heart without angina pectoris      famotidine (PEPCID) 40 MG tablet Take 1 tablet (40 mg total) by mouth once daily.  Qty: 90 tablet, Refills: 3      ferrous sulfate (FEROSUL) 325 mg (65 mg iron) Tab tablet TAKE 1 TABLET BY MOUTH TWICE DAILY  Qty: 180 tablet, Refills: 3    Associated Diagnoses: Iron deficiency anemia      fluticasone propionate (FLONASE) 50 mcg/actuation nasal spray SHAKE LIQUID AND USE 2 SPRAYS(100 MCG) IN EACH NOSTRIL EVERY DAY  Qty: 16 g, Refills: 5    Associated Diagnoses: Chronic sinus complaints      FOLIC ACID/MULTIVIT-MIN/LUTEIN (CENTRUM SILVER ORAL) Take 1 tablet by mouth once daily.      magnesium oxide (MAG-OX) 400 mg (241.3 mg magnesium) tablet Take 1 tablet (400 mg total) by mouth once daily.  Qty: 90 tablet, Refills: 3    Associated Diagnoses: Essential hypertension      mecobal-levomefolat Ca-B6 phos (L-METHYL-B6-B12) 3-35-2 mg Tab Take 1 tablet by mouth 2 (two) times daily.  Qty: 180 tablet, Refills: 3    Associated Diagnoses: Iron deficiency anemia      mirabegron (MYRBETRIQ) 50 mg Tb24 Take 1 tablet (50 mg total) by mouth once daily.  Qty: 90 tablet, Refills: 3      nitroGLYCERIN 0.4 MG/DOSE TL SPRY (NITROLINGUAL) 400 mcg/spray spray PLACE 1 SPRAY UNDER THE TONGUE EVERY 5 MINUTES AS NEEDED FOR CHEST PAIN  Qty: 4.9 g, Refills: 9    Comments: **Patient requests 90 days supply**  Associated Diagnoses: Coronary artery disease, angina presence unspecified, unspecified vessel or lesion type, unspecified whether native or transplanted heart      omeprazole (PRILOSEC) 20 MG capsule Take 1 capsule (20 mg total) by mouth once daily.  Qty: 90 capsule, Refills: 3       prothrombin time/INR test metr Misc 1 Stick by Misc.(Non-Drug; Combo Route) route every 30 days.  Qty: 1 each, Refills: 0    Associated Diagnoses: Paroxysmal atrial fibrillation           STOP taking these medications       carvediloL (COREG) 25 MG tablet Comments:   Reason for Stopping:         HYDROcodone-acetaminophen (NORCO) 5-325 mg per tablet Comments:   Reason for Stopping:         LIDOcaine (LIDODERM) 5 % Comments:   Reason for Stopping:         nystatin (MYCOSTATIN) powder Comments:   Reason for Stopping:         QUEtiapine (SEROQUEL) 25 MG Tab Comments:   Reason for Stopping:             Follow up:    Follow-up Information       Familia Ramirez Jr, MD Follow up in 1 week(s).    Specialty: Family Medicine  Why: hospital follow up  Contact information:  [address]  [address]  [place] [address]  [phone]               Kai Ortiz MD Follow up in 3 day(s).    Specialties: Hematology, Hematology and Oncology, Oncology  Why: hospital follow up and coumadin monitoring  Contact information:  [address] [facility]  [address]  [place] [address]  [phone]               Kike Kc DO Follow up on 4/10/2023.    Specialty: Neurosurgery  Contact information:  [address] [place] [address]  [address]  [place] [address]  [phone]                               Immunizations Administered as of 4/6/2023       Name Date Dose VIS Date Route Exp Date    COVID-19, MRNA, LN-S, PF (Pfizer) (Purple Cap) 10/15/2021  2:40 PM 0.3 mL 12/12/2020 Intramuscular 1/31/2022    Site: Right deltoid     Given By: Estiven Reese MA     : [employer]     Lot: [id number]     COVID-19, MRNA, LN-S, PF (Pfizer) (Purple Cap) 3/27/2021  8:34 AM 0.3 mL 12/12/2020 Intramuscular 6/30/2021    Site: Left deltoid     Given By: Aydin Smart MA     : [employer]     Lot: [id number]     COVID-19, MRNA, LN-S, PF (Pfizer) (Purple Cap) 3/6/2021 12:11 PM 0.3 mL 12/12/2020 Intramuscular 3/6/2021    Site: Left deltoid     Given  By: Emanuel Johnston     : [employer]     Lot: [id number]                 Bill Gamino MD

## 2023-04-03 NOTE — PLAN OF CARE
1200: ERIC received a call from Pierce with [facility] regarding SNF referral, she reported they would need PT notes from today (4/3) to make a decision regarding SNF. CM messaged PT to ask if they could put in PT notes. Stated they would after lunch.    1400: ERIC called and left voicemail for Pierce with [facility] to update that PT notes are in now and to please review. CM following.

## 2023-04-03 NOTE — PLAN OF CARE
Per Shannon with [facility], they are unable to take pt today per their medical director. NATHALIE Muller updated and will speak to medical director regarding this.      04/03/23 1600   Post-Acute Status   Post-Acute Authorization Placement   Post-Acute Placement Status Pending post-acute provider review/more information requested

## 2023-04-03 NOTE — CONSULTS
Wound care consult completed patient noted to have an unstageable PI POA, cleansed wound with wound cleanser and patted dey with gauze applied aquacel ag advantage to wound bed applied skin prep to surrounding skin and secured with a foam dressing.       Sacrum stage 3 PI POA     L arm - cleansed with wound cleanser and applied a layer of triad to skin tears.     R arm- Cleansed with wound cleanser and patted dry with gauze.   Inferior wound- apply urgotul skin prep and secure with foam dressing.   Superior wound - apply a layer of triad.     R leg Cleansed with wound cleanser and patted dry with gauze.   Inferior wound- apply urgotul skin prep and secure with foam dressing.     L leg Cleansed with wound cleanser and patted dry with gauze.   Inferior wound- apply urgotul skin prep and secure with foam dressing applied  triad to remaining surrounding skin.     L lower abd fold Cleansed with wound cleanser and patted dry with gauze and applied a thin layer of triad     Abrasions noted to L shoulder Cleansed with wound cleanser and patted dry with gauze and applied a thin layer of triad

## 2023-04-03 NOTE — PROGRESS NOTES
"Dd  [facility]  Adult Nutrition  Progress Note    SUMMARY       Recommendations   1) Continue DM 2000 kcal, cardiac diet   2) weigh weekly    Goals: 1) PO Intakes > 75% of meals at f/u  Nutrition Goal Status: new  Communication of RD Recs:  (POC, sticky note)    Assessment and Plan    Inadequate energy intake  R/t AMS  As evidenced by 1-2 days of PO intakes < 50% of needs  Intervention: carb/fat/Na modified diet   resolved     Malnutrition Assessment     Skin (Micronutrient):  (Haseeb = 14, spine incision, bruised face and wound to head s/p fall)  Teeth (Micronutrient):  (missing some)   Micronutrient Evaluation: suspected deficiency  Micronutrient Evaluation Comments: check iron, folate, B12   Weight Loss (Malnutrition):  (14% x 1 year)           Edema (Fluid Accumulation): 0-->no edema present   Subcutaneous Fat Loss (Final Summary): well nourished  Muscle Loss Evaluation (Final Summary): well nourished         Reason for Assessment    Reason For Assessment: identified at risk by screening criteria  Diagnosis:  (AMS)  Relevant Medical History: HTN, CAD s/p stents, lumbar DDD, DM2, gout, AFIB, dementia, MI, CHF, HLD  Interdisciplinary Rounds: did not attend    General Information Comments: 76 y/o male admitted with AMS. Feeling better and plan to d/c to [facility] today. Good appetite. NFPE not done, appears well-nourished. Insignificant wt loss per chart review.    Nutrition Discharge Planning: To be determined- Cardiac, DM 1800 kcal diet    Nutrition Risk Screen    Nutrition Risk Screen: no indicators present    Nutrition/Diet History    Spiritual, Cultural Beliefs, Moravian Practices, Values that Affect Care: no  Food Allergies: NKFA  Factors Affecting Nutritional Intake: impaired cognitive status/motor control    Anthropometrics    Temp: 96.3 °F (35.7 °C)  Height Method: Measured (office 4/13/22)  Height: 5' 6"  Height (inches): 66 in  Weight Method: Bed Scale  Weight: 104 kg (229 lb " 4.5 oz)  Weight (lb): 229.28 lb  Ideal Body Weight (IBW), Male: 142 lb  % Ideal Body Weight, Male (lb): 161.46 %  BMI (Calculated): 37  BMI Grade: 35 - 39.9 - obesity - grade II  Usual Body Weight (UBW), k.6 kg (22)  % Usual Body Weight: 85.71  % Weight Change From Usual Weight: -14.47 %       Lab/Procedures/Meds    Pertinent Labs Reviewed: reviewed  BMP  Lab Results   Component Value Date     2023    K 3.8 2023     (H) 2023    CO2 22 (L) 2023    BUN 33 (H) 2023    CREATININE 1.4 2023    CALCIUM 9.1 2023    ANIONGAP 8 2023    EGFRNORACEVR 52 (A) 2023     POCT Glucose   Date Value Ref Range Status   2023 127 (H) 70 - 110 mg/dL Final   2023 101 70 - 110 mg/dL Final   2023 124 (H) 70 - 110 mg/dL Final   2023 130 (H) 70 - 110 mg/dL Final   2023 174 (H) 70 - 110 mg/dL Final   2023 120 (H) 70 - 110 mg/dL Final   2023 89 70 - 110 mg/dL Final   2023 108 70 - 110 mg/dL Final   2023 88 70 - 110 mg/dL Final   2023 96 70 - 110 mg/dL Final   2023 99 70 - 110 mg/dL Final     Lab Results   Component Value Date    LABA1C 6.6 (H) 2016    HGBA1C 5.1 2022     Lab Results   Component Value Date    ALBUMIN 2.1 (L) 2023       Pertinent Medications Reviewed: reviewed  Pertinent Medications Comments: NS @ 50 ml/hr, insulin, zofran, Kbicarb, warfarin, calcitriol, iron, MVI    Estimated/Assessed Needs    Weight Used For Calorie Calculations: 104 kg (229 lb 4.5 oz)  Energy Calorie Requirements (kcal): MSJ no AF obesity = 1715 kcal  Energy Need Method: Deborah Zelaya  Protein Requirements: 0.8-1 g protein/kg (  g)  Weight Used For Protein Calculations: 104 kg (229 lb 4.5 oz)  Fluid Requirements (mL): 1700 ml or per MD  Estimated Fluid Requirement Method: RDA Method  CHO Requirement: 192-235 g      Nutrition Prescription Ordered    Current Diet Order: Cardiac, DM 2000  kcal    Evaluation of Received Nutrient/Fluid Intake    Energy Calories Required: meeting needs  Protein Required: meeting needs  Fluid Required: not meeting needs  Tolerance: tolerating     Intake/Output Summary (Last 24 hours) at 4/3/2023 1140  Last data filed at 4/3/2023 0715  Gross per 24 hour   Intake 780 ml   Output 200 ml   Net 580 ml       % Intake of Estimated Energy Needs: %  % Meal Intake: %    Nutrition Risk    Level of Risk/Frequency of Follow-up:  (1 x weekly)     Monitor and Evaluation    Food and Nutrient Intake: energy intake  Food and Nutrient Adminstration: diet order  Physical Activity and Function: nutrition-related ADLs and IADLs  Anthropometric Measurements: weight  Biochemical Data, Medical Tests and Procedures: electrolyte and renal panel, glucose/endocrine profile  Nutrition-Focused Physical Findings: overall appearance     Nutrition Follow-Up    RD Follow-up?: Yes

## 2023-04-03 NOTE — PLAN OF CARE
Problem: Adult Inpatient Plan of Care  Goal: Plan of Care Review  Outcome: Ongoing, Progressing  Goal: Optimal Comfort and Wellbeing  Outcome: Ongoing, Progressing     Problem: Diabetes Comorbidity  Goal: Blood Glucose Level Within Targeted Range  Outcome: Ongoing, Progressing     Problem: Respiratory Compromise (Pneumonia)  Goal: Effective Oxygenation and Ventilation  Outcome: Ongoing, Progressing     Problem: Impaired Wound Healing  Goal: Optimal Wound Healing  Outcome: Ongoing, Progressing     Problem: Fall Injury Risk  Goal: Absence of Fall and Fall-Related Injury  Outcome: Ongoing, Progressing   Pt's plan to discharge today came to a p[facility] not accepted to [facility]. Looking for new SNF placement.  Blood glucose remained within range.  Dr. Eddy in to see pt's wounds and pictures and new bandages were placed by wound care.  No falls during shift.  No c/o pain voiced during shift.  Pt continues to sat at 96% on RA.  Pt has been much more confused today but still easily reoriented.

## 2023-04-03 NOTE — PLAN OF CARE
Per Ray with [employer], auth # is [id number]     sent updated clinicals and DC orders to [place] for review.     04/03/23 6243   Post-Acute Status   Post-Acute Authorization Placement   Post-Acute Placement Status Pending post-acute provider review/more information requested

## 2023-04-03 NOTE — PLAN OF CARE
Problem: Physical Therapy  Goal: Physical Therapy Goal  Description: Goals to be met by: 4/15/2023     Patient will increase functional independence with mobility by performin). Supine to sit with MInimal Assistance  2). Sit to supine with MInimal Assistance  3). Bed to chair transfer with Moderate Assistance   4). Sitting at edge of bed x> 10 minutes with Contact Guard Assistance    Outcome: Ongoing, Progressing   Pt seen for thera ex in supine. Max assist to sit EOB and min assist to close supervision for static sitting balance. Aspen cervical collar on for mobility training. Pt to benefit from SNF

## 2023-04-03 NOTE — PLAN OF CARE
Problem: Occupational Therapy  Goal: Occupational Therapy Goal  Description: Goals to be met by: 4/29/23     Patient will increase functional independence with ADLs by performing:    Feeding with Minimal Assistance.  UE Dressing with Moderate Assistance.  Grooming while seated with Minimal Assistance.  Increased functional strength to WFL for participation in ADL's/IADL's.    Outcome: Ongoing, Progressing

## 2023-04-03 NOTE — PROGRESS NOTES
"[facility] Medicine  Progress Note    Patient Name: Richard Bustos  MRN: [id number]  Patient Class: OP- Observation   Admission Date: 3/31/2023  Length of Stay: 0 days  Attending Physician: Heather Shah MD  Primary Care Provider: Familia Ramirez Jr, MD        Subjective:     Principal Problem:Altered mental status        HPI:  Richard Bustos is a 77 yr old male with PMHx significant for  PMHx of HTN, CAD s/p cardiac stents, lumbar DDD with chronic bilat LE weakness, HLD, gout, MI, CHF, arthritis, A-fib, on Coumadin, DM II, dementia, and MTHFR mutation who presents to the ED via EMS from [facility].  History obtained by ED physician due to pts underlying dementia. Per ED staff daughter reports after arriving to the skilled nursing earlier today, she noticed the patient was "starring off into space" ~20 minutes after a PT session.  EMS states upon their arrival on scene, staff mentioned the patient's respiratory rate dropped to 6 with an associated systolic pressure of 70 mmHg.  EMS reported the nursing home personnel described an episode "resembling a seizure without jerking" prior to EMS arrival.  Pt had  normal CBG en route to the ED.  Upon arrival to ED pt was awake and alert and responding appropriately and bp was stable. Per chart review pt experienced a fall on 03/17/2023 which resulted in cervical fracture with cord edema and pt underwent cervical spine fusion performed on 03/24/2023 by Dr. Kc. Pt was d/c to skilled Nursing Facility 3 days ago.  Patient recalls working with PT this morning.  He reports he received 2 pain pills prior to his PT session and this caused him extreme fatigue and he believes this is the cause of his episode today.  Patient denies any acute complaints at this time.  He reports chronic weakness that is the same as prior to his surgery.  Patient is pleasantly confused but oriented to name, date of birth and year. Family not present at BS. Pt reports he " feels back as baseline. Pt will be admitted to hospital medicine services for further workup and management.      Overview/Hospital Course:  No notes on file    Interval History:  Notes reviewed, no acute events overnight.  Patient resting comfortably in bed.  Daughter present at  and reports pt had episode of confusion and delirium last night and this has continued this morning.  Upon my exam patient is awake alert oriented x4 and states that he is at [place].  I reoriented patient he is still in the hospital.  Patient denies acute pain.  Explained to daughter that patient's delirium likely due to prolonged hospitalization along with underlying dementia.  Daughter agrees.  Patient continues to participate well with therapy.  Plan for DC back to [facility] today.      Review of Systems   Constitutional:  Positive for fatigue. Negative for chills and fever.   HENT:  Negative for congestion, sore throat and trouble swallowing.    Respiratory:  Negative for cough, chest tightness and shortness of breath.    Cardiovascular:  Negative for chest pain, palpitations and leg swelling.   Gastrointestinal:  Negative for abdominal pain, diarrhea, nausea and vomiting.   Genitourinary:  Negative for difficulty urinating, dysuria, frequency and urgency.   Musculoskeletal:  Positive for gait problem. Negative for arthralgias and back pain.   Skin:  Positive for color change and wound. Negative for pallor and rash.   Psychiatric/Behavioral:  Negative for agitation, behavioral problems and confusion.    All other systems reviewed and are negative.  Objective:     Vital Signs (Most Recent):  Temp: 96.6 °F (35.9 °C) (04/03/23 1417)  Pulse: 71 (04/03/23 1417)  Resp: 16 (04/03/23 1417)  BP: (!) 165/92 (04/03/23 1417)  SpO2: 95 % (04/03/23 1417)   Vital Signs (24h Range):  Temp:  [96.3 °F (35.7 °C)-97.6 °F (36.4 °C)] 96.6 °F (35.9 °C)  Pulse:  [63-72] 71  Resp:  [16-18] 16  SpO2:  [92 %-97 %] 95 %  BP: (117-165)/(56-92) 165/92      Weight: 104 kg (229 lb 4.5 oz)  Body mass index is 37.01 kg/m².    Intake/Output Summary (Last 24 hours) at 4/3/2023 1612  Last data filed at 4/3/2023 0715  Gross per 24 hour   Intake 540 ml   Output --   Net 540 ml        Physical Exam  Vitals and nursing note reviewed.   Constitutional:       General: He is not in acute distress.     Appearance: He is well-developed. He is ill-appearing (chronically ill appearing). He is not diaphoretic.   HENT:      Head: Normocephalic.      Comments: Significant ecchymosis in various stages to face, large hematoma to forehead, scattered abrasions to face     Right Ear: External ear normal.      Left Ear: External ear normal.      Nose: Nose normal. No congestion or rhinorrhea.      Mouth/Throat:      Mouth: Mucous membranes are moist.      Pharynx: Oropharynx is clear. No oropharyngeal exudate or posterior oropharyngeal erythema.   Eyes:      General: No scleral icterus.     Conjunctiva/sclera: Conjunctivae normal.      Pupils: Pupils are equal, round, and reactive to light.   Neck:      Vascular: No JVD.   Cardiovascular:      Rate and Rhythm: Normal rate and regular rhythm.      Pulses: Normal pulses.      Heart sounds: Normal heart sounds. No murmur heard.  Pulmonary:      Effort: Pulmonary effort is normal. No respiratory distress.      Breath sounds: Normal breath sounds. No stridor. No wheezing, rhonchi or rales.   Abdominal:      General: Bowel sounds are normal. There is no distension.      Palpations: Abdomen is soft.      Tenderness: There is no abdominal tenderness.   Musculoskeletal:         General: Swelling present. No tenderness.      Cervical back: Normal range of motion and neck supple.      Comments: Edema LUE   Skin:     General: Skin is warm and dry.      Capillary Refill: Capillary refill takes 2 to 3 seconds.      Coloration: Skin is not jaundiced or pale.      Findings: Bruising present. No erythema.      Comments: Significant bruising head to toe with  scattered abrasions and skin tears   Neurological:      General: No focal deficit present.      Mental Status: He is alert and oriented to person, place, and time.      Cranial Nerves: No cranial nerve deficit.      Sensory: No sensory deficit.      Motor: Weakness present.      Comments: Chronic generalized weakness to all exts, 2/5 strength   Psychiatric:         Mood and Affect: Mood normal.         Behavior: Behavior normal.         Thought Content: Thought content normal.       Significant Labs: All pertinent labs within the past 24 hours have been reviewed.  CBC:   Recent Labs   Lab 04/02/23  0502 04/03/23  0356   WBC 13.94* 8.85   HGB 8.7* 8.2*   HCT 25.7* 24.1*    278       CMP:   Recent Labs   Lab 04/02/23  0502 04/03/23  0356    141   K 4.1 3.8    111*   CO2 21* 22*   GLU 93 115*   BUN 36* 33*   CREATININE 1.3 1.4   CALCIUM 9.4 9.1   PROT 5.3* 5.0*   ALBUMIN 2.2* 2.1*   BILITOT 0.7 0.7   ALKPHOS 86 78   AST 32 30   ALT 19 20   ANIONGAP 9 8         Significant Imaging: I have reviewed all pertinent imaging results/findings within the past 24 hours.      Assessment/Plan:      * Altered mental status  Pt experienced episode of altered mental status with decreased LOC after receiving 2 pain pills and undergoing physical therapy  Suspect this is etiology of episode   Patient back at baseline currently   Will obtain EEG and consult Neurology   Patient can not go under MRI due to recent cervical instrumentation   CT head reviewed and negative for acute findings    4/1 - patient's mentation stable today.  Suspect episode yesterday was due to narcotics administered.  Will hold all opiates and continue to monitor overnight and if patient remains stable plan to discharge back to [place] tomorrow.    4/2 -pts mentation remains much improved and stable today. Will cont to monitor closely and plan to d/c back to [facility] tomorrow.         Anemia  Patient's anemia is currently controlled. Has  not received any PRBCs to date.. Etiology likely d/t chronic disease and acute blood loss from surgery.  Current CBC reviewed-   Lab Results   Component Value Date    HGB 8.4 (L) 04/01/2023    HCT 25.3 (L) 04/01/2023     Monitor serial CBC and transfuse if patient becomes hemodynamically unstable, symptomatic or H/H drops below 7/21.     Check iron panel in AM        Closed nondisplaced fracture of sixth cervical vertebra  Per chart review pt experienced a fall on 03/17/2023 which resulted in cervical fracture with cord edema and pt underwent cervical spine fusion performed on 03/24/2023 by Dr. Kc. Pt was d/c to skilled Nursing Facility 3 days ago.   Cont PT/OT  Fall precautions      Generalized weakness  Chronic, pt with increasing deconditioning since fall and cervical fracture  Cont PT/OT  Fall precautions      Paroxysmal atrial fibrillation  Patient with Paroxysmal (<7 days) atrial fibrillation which is controlled currently with Amiodarone. Patient is currently in sinus rhythm.[id number] Score: 4. Anticoagulation indicated. Anticoagulation done with coumadin.    Pts INR subtherapeutic, increase dose to 5mg and monitor        Hyperlipidemia associated with type 2 diabetes mellitus  Chronic, stable  Cont home meds    Hypertension associated with diabetes  Chronic, controlled.  Latest blood pressure and vitals reviewed-   Temp:  [96.3 °F (35.7 °C)-98 °F (36.7 °C)]   Pulse:  []   Resp:  [17-20]   BP: (100-157)/(59-89)   SpO2:  [95 %-96 %] .   Home meds for hypertension were reviewed and noted below.   Hypertension Medications             carvediloL (COREG) 25 MG tablet Take 1 tablet (25 mg total) by mouth 2 (two) times daily with meals.    lisinopriL (PRINIVIL,ZESTRIL) 40 MG tablet Take 1 tablet (40 mg total) by mouth every evening.    nitroGLYCERIN 0.4 MG/DOSE TL SPRY (NITROLINGUAL) 400 mcg/spray spray PLACE 1 SPRAY UNDER THE TONGUE EVERY 5 MINUTES AS NEEDED FOR CHEST PAIN          While in the hospital,  will manage blood pressure as follows; Continue home antihypertensive regimen    Will utilize p.r.n. blood pressure medication only if patient's blood pressure greater than  180/110 and he develops symptoms such as worsening chest pain or shortness of breath.    4/1 - blood pressure on lower side today, medications adjusted and will continue amiodarone and lisinopril dose decreased to 20 mg nightly.  Will continue monitor closely.  Holding beta-blocker due to mild bradycardia and low blood pressure.    MTHFR mutation (methylenetetrahydrofolate reductase)  Cont coumadin  Monitor labs      Diabetes mellitus with chronic kidney disease, stage III  Patient's FSGs are controlled on current medication regimen.  Last A1c reviewed-   Lab Results   Component Value Date    LABA1C 6.6 (H) 08/08/2016    HGBA1C 5.1 01/06/2022     Most recent fingerstick glucose reviewed- No results for input(s): POCTGLUCOSE in the last 24 hours.  Current correctional scale  Low  Maintain anti-hyperglycemic dose as follows-   Antihyperglycemics (From admission, onward)    None        Hold Oral hypoglycemics while patient is in the hospital.      VTE Risk Mitigation (From admission, onward)         Ordered     warfarin (COUMADIN) tablet 5 mg  Daily         04/03/23 1615     IP VTE HIGH RISK PATIENT  Once         03/31/23 2030     Place sequential compression device  Until discontinued         03/31/23 2030     Reason for No Pharmacological VTE Prophylaxis  Once        Question:  Reasons:  Answer:  Already adequately anticoagulated on oral Anticoagulants    03/31/23 2030                Discharge Planning   ORACIO: 4/3/2023     Code Status: Full Code   Is the patient medically ready for discharge?:     Reason for patient still in hospital (select all that apply): Pending disposition  Discharge Plan A: Skilled Nursing Facility                  Adeline Hammonds NP  Department of Hospital Medicine   [facility]

## 2023-04-03 NOTE — CONSULTS
Chief complaint:  Altered Mental Status (Brief peroid of altered mental status  / awake , answers questions on arrival to ED)      HPI:  Richard Bustos is a 77 y.o. male presenting with multiple open wounds from a prior fall. Pt was here for treatment and was discharged. Pt was re-admitted with possible seizure. We are consulted for multiple UE and LE wounds as well as a stage 3 sacrum pressure ulcer all POA. Pt is improved from last visit, and has no other complaints today.    PMH:  As per HPI and below:  Past Medical History:   Diagnosis Date    Anemia     Anemia of other chronic disease 09/13/2017    Anemia, chronic renal failure 09/13/2017    Anemia, unspecified 09/13/2017    Anticoagulant long-term use     Aorta aneurysm     Arthritis     Atrial fibrillation     Bacteremia due to Streptococcus 01/08/2022    CAD (coronary artery disease)     CHF (congestive heart failure)     Chronic kidney disease     Clotting disorder 09/13/2017    Colon polyp     Dementia     Diabetes mellitus     not on meds    Diabetes mellitus type II     Diverticulosis     Elevated PSA     Encounter for blood transfusion     Former smoker     General anesthetics causing adverse effect in therapeutic use     TROUBLE COMING OUT OF ANESTHESIA WITH GASTRIC BYPASS    GERD (gastroesophageal reflux disease)     Gout     Hx of colonic polyps     Hypercoagulable state     Hyperlipidemia     Hypertension     MI, old 2010    MTHFR mutation     Myocardial infarction     9/2010    Osteomyelitis of ankle or foot 03/09/2017    Prostate cancer 06/2016    Sleep apnea     Squamous cell carcinoma 01/23/2018    Left helix, imiquimod    Venous stasis     Chronic       Social History     Socioeconomic History    Marital status:    Tobacco Use    Smoking status: Former    Smokeless tobacco: Never    Tobacco comments:     45 yrs ago, only smoked 3-4 packs in his entire life as a teenager   Substance and Sexual Activity    Alcohol use: Not Currently      Comment: rare    Drug use: No    Sexual activity: Not Currently       Past Surgical History:   Procedure Laterality Date    ABDOMINAL SURGERY      CARDIAC SURGERY      3 STENTS     CAUDAL EPIDURAL STEROID INJECTION N/A 6/22/2020    Procedure: INJECTION, STEROID, SPINE, EPIDURAL, CAUDAL;  Surgeon: Evangelist Bose MD;  [facility];  Service: Pain Management;  Laterality: N/A;    COLONOSCOPY  02/2011    diverticulosis with diverticula with clot, no active bleeding    COLONOSCOPY N/A 8/24/2016    Procedure: COLONOSCOPY;  Surgeon: Saroj Borjas MD;  Location: [facility] ENDO;  Service: Endoscopy;  Laterality: N/A;    COLONOSCOPY N/A 11/18/2020    Procedure: COLONOSCOPY;  Surgeon: Saroj Borjas MD;  Location: [facility] ENDO;  Service: Endoscopy;  Laterality: N/A;    COLONOSCOPY N/A 10/27/2021    Procedure: COLONOSCOPY;  Surgeon: Saroj Borjas MD;  Location: [facility] ENDO;  Service: Endoscopy;  Laterality: N/A;    EPIDURAL STEROID INJECTION INTO LUMBAR SPINE N/A 6/22/2020    Procedure: Injection-steroid-epidural-lumbar;  Surgeon: Evangelist Bose MD;  [facility];  Service: Pain Management;  Laterality: N/A;  L3 L4 L5 S1    EPIDURAL STEROID INJECTION INTO LUMBAR SPINE N/A 9/21/2020    Procedure: Injection-steroid-epidural-lumbar;  Surgeon: Evangelist Bose MD;  [facility];  Service: Pain Management;  Laterality: N/A;  L5-S1    FUSION, SPINE, CERVICAL N/A 3/24/2023    Procedure: FUSION, SPINE, CERVICAL;  Surgeon: Kike Kc DO;  Location: [facility] OR;  Service: Neurosurgery;  Laterality: N/A;  posterior cervical decompression/fusion C3-T1    GASTRIC BYPASS  2/5/2008    IVC FILTER RETRIEVAL      JOINT REPLACEMENT  1996 and 2001    bi-lat hip replacement/Rt Hip and Lt Hip    RADIOFREQUENCY ABLATION OF LUMBAR MEDIAL BRANCH NERVE AT SINGLE LEVEL Bilateral 1/10/2020    Procedure: Radiofrequency Ablation, Nerve, Spinal, Lumbar, Medial Branch, 1 Level;  Surgeon: Evangelist Bose MD;  Location: [facility];  Service:  Pain Management;  Laterality: Bilateral;  L3,L4,L5    RADIOFREQUENCY ABLATION OF LUMBAR MEDIAL BRANCH NERVE AT SINGLE LEVEL Bilateral 11/23/2021    Procedure: Radiofrequency Ablation, Nerve, Spinal, Lumbar, Medial Branch, Bilateral L 3,4,5;  Surgeon: Nile Causey MD;  Location: [facility] OR;  Service: Pain Management;  Laterality: Bilateral;    ROTATOR CUFF REPAIR      Rt shoulder    Stents  8/18/2010    x 3    UPPER GASTROINTESTINAL ENDOSCOPY  02/2011       Family History   Problem Relation Age of Onset    Heart attack Mother     Heart attack Father     Heart attack Brother     Ulcerative colitis Daughter 35    Lupus Daughter     Colon cancer Neg Hx     Colon polyps Neg Hx     Crohn's disease Neg Hx     Melanoma Neg Hx     Psoriasis Neg Hx     Eczema Neg Hx        Review of patient's allergies indicates:   Allergen Reactions    Donepezil Other (See Comments)     AMS    Bactroban [mupirocin calcium] Blisters     Causes Blisters    Shellfish containing products Other (See Comments)     Other reaction(s): Gout  OYSTERS       Current Facility-Administered Medications on File Prior to Encounter   Medication Dose Route Frequency Provider Last Rate Last Admin    lactated ringers infusion   Intravenous Once PRN Evangelist Bose MD         Current Outpatient Medications on File Prior to Encounter   Medication Sig Dispense Refill    allopurinoL (ZYLOPRIM) 100 MG tablet Take 1 tablet (100 mg total) by mouth every evening. 90 tablet 3    amiodarone (PACERONE) 200 MG Tab Take 1 tablet (200 mg total) by mouth 2 (two) times daily. 60 tablet 11    aspirin (ECOTRIN) 81 MG EC tablet Take 81 mg by mouth once daily.      atorvastatin (LIPITOR) 80 MG tablet Take 1 tablet (80 mg total) by mouth once daily. 90 tablet 3    clopidogreL (PLAVIX) 75 mg tablet Take 1 tablet (75 mg total) by mouth once daily. 90 tablet 3    famotidine (PEPCID) 40 MG tablet Take 1 tablet (40 mg total) by mouth once daily. 90 tablet 3    ferrous sulfate (FEROSUL) 325  mg (65 mg iron) Tab tablet TAKE 1 TABLET BY MOUTH TWICE DAILY 180 tablet 3    fluticasone propionate (FLONASE) 50 mcg/actuation nasal spray SHAKE LIQUID AND USE 2 SPRAYS(100 MCG) IN EACH NOSTRIL EVERY DAY 16 g 5    FOLIC ACID/MULTIVIT-MIN/LUTEIN (CENTRUM SILVER ORAL) Take 1 tablet by mouth once daily.      magnesium oxide (MAG-OX) 400 mg (241.3 mg magnesium) tablet Take 1 tablet (400 mg total) by mouth once daily. 90 tablet 3    mecobal-levomefolat Ca-B6 phos (L-METHYL-B6-B12) 3-35-2 mg Tab Take 1 tablet by mouth 2 (two) times daily. 180 tablet 3    mirabegron (MYRBETRIQ) 50 mg Tb24 Take 1 tablet (50 mg total) by mouth once daily. 90 tablet 3    nitroGLYCERIN 0.4 MG/DOSE TL SPRY (NITROLINGUAL) 400 mcg/spray spray PLACE 1 SPRAY UNDER THE TONGUE EVERY 5 MINUTES AS NEEDED FOR CHEST PAIN 4.9 g 9    omeprazole (PRILOSEC) 20 MG capsule Take 1 capsule (20 mg total) by mouth once daily. 90 capsule 3    prothrombin time/INR test metr Misc 1 Stick by Misc.(Non-Drug; Combo Route) route every 30 days. 1 each 0    [DISCONTINUED] calcitRIOL (ROCALTROL) 0.5 MCG Cap 0.75 mcg once daily.   4    [DISCONTINUED] carvediloL (COREG) 25 MG tablet Take 1 tablet (25 mg total) by mouth 2 (two) times daily with meals. 180 tablet 3    [DISCONTINUED] HYDROcodone-acetaminophen (NORCO) 5-325 mg per tablet Take 1 tablet by mouth every 8 (eight) hours as needed for Pain. 4 tablet 0    [DISCONTINUED] LIDOcaine (LIDODERM) 5 % Place 1 patch onto the skin once daily. Remove & Discard patch within 12 hours or as directed by MD 15 patch 0    [DISCONTINUED] lisinopriL (PRINIVIL,ZESTRIL) 40 MG tablet Take 1 tablet (40 mg total) by mouth every evening. 90 tablet 3    [DISCONTINUED] nystatin (MYCOSTATIN) powder Apply topically 2 (two) times daily. 60 g 11    [DISCONTINUED] QUEtiapine (SEROQUEL) 25 MG Tab Take 12.5 mg by mouth 2 (two) times daily.      [DISCONTINUED] warfarin (COUMADIN) 5 MG tablet 5 mg except on wednesday and saturday Wed and sat 2.5 mgs  "(Patient taking differently: Take 10 mg by mouth Daily. Total 10mg Q Mon, Tues, Thur, Fri, and Sun  Total 7.5mg Q Wed, Sat) 90 tablet 3       ROS: As per HPI and below:  Pertinent items are noted in HPI.      Physical Exam:     Vitals:    23 0748 23 0753 23 1059 23 1113   BP: (!) 117/56      BP Location:       Patient Position:       Pulse: 69 72     Resp: 16 16     Temp: 96.3 °F (35.7 °C)      TempSrc:       SpO2: 96% 96%     Weight:   104 kg (229 lb 4.5 oz)    Height:   5' 6" (1.676 m) 5' 6" (1.676 m)       BP  Min: 100/59  Max: 165/78  Temp  Av.1 °F (36.2 °C)  Min: 96.2 °F (35.7 °C)  Max: 98.2 °F (36.8 °C)  Pulse  Av.4  Min: 56  Max: 100  Resp  Av  Min: 16  Max: 20  SpO2  Av.2 %  Min: 92 %  Max: 97 %  Height  Av' 7.5" (171.5 cm)  Min: 5' 6" (167.6 cm)  Max: 5' 9" (175.3 cm)  Weight  Av.5 kg (234 lb 13.6 oz)  Min: 104 kg (229 lb 4.5 oz)  Max: 111.6 kg (246 lb)    Body mass index is 37.01 kg/m².          General:             Well developed, well nourished, no apparent distress  HEENT:              NCAT, no JVD, mucous membranes moist, EOM intact  Cardiovascular:  Regular rate and rhythm, normal S1, normal S2, No murmurs, rubs, or gallops  Respiratory:        Normal breath sounds, no wheezes, no rales, no rhonchi  Abdomen:           Bowel sounds present, non tender, non distended, no masses, no hepatojugular reflux  Extremities:        No clubbing, no cyanosis, no edema  Neurological:      No focal deficits  Skin:                   No obvious rashes or erythema, multiple BL LE and BL UE skin tears, stage 3 sacral pressure ulcer POA                                          Lab Results   Component Value Date    WBC 8.85 2023    HGB 8.2 (L) 2023    HCT 24.1 (L) 2023    MCV 99 (H) 2023     2023     Lab Results   Component Value Date    CHOL 112 03/10/2021    CHOL 114 2019    CHOL 113 (L) 2018     Lab Results "   Component Value Date    HDL 49 03/10/2021    HDL 41 08/16/2019    HDL 42 08/13/2018     Lab Results   Component Value Date    LDLCALC 44 03/10/2021    LDLCALC 51 08/16/2019    LDLCALC 50.8 (L) 08/13/2018     Lab Results   Component Value Date    TRIG 96 03/10/2021    TRIG 111 08/16/2019    TRIG 101 08/13/2018     Lab Results   Component Value Date    CHOLHDL 43.8 03/10/2021    CHOLHDL 36.0 08/16/2019    CHOLHDL 37.2 08/13/2018     CMP  Recent Labs   Lab 04/03/23  0356   *   CALCIUM 9.1   ALBUMIN 2.1*   PROT 5.0*      K 3.8   CO2 22*   *   BUN 33*   CREATININE 1.4   ALKPHOS 78   ALT 20   AST 30   BILITOT 0.7      Lab Results   Component Value Date    TSH 1.009 03/21/2023         Assessment and Recommendations       Diagnoses:    1. BL LE multiple skin tears  2. BL UE multiple skin tears  3. Sacral stage 3 pressure ulcer    Plan:  1. Urgotul + foam to all UE and LE open wounds  2. Aquacell + foam dressing daily      Complexity:    medium

## 2023-04-03 NOTE — SUBJECTIVE & OBJECTIVE
Interval History:  Notes reviewed, no acute events overnight.  Patient resting comfortably in bed.  Daughter present at  and reports pt had episode of confusion and delirium last night and this has continued this morning.  Upon my exam patient is awake alert oriented x4 and states that he is at [facility].  I reoriented patient he is still in the hospital.  Patient denies acute pain.  Explained to daughter that patient's delirium likely due to prolonged hospitalization along with underlying dementia.  Daughter agrees.  Patient continues to participate well with therapy.  Plan for DC back to [facility] today.      Review of Systems   Constitutional:  Positive for fatigue. Negative for chills and fever.   HENT:  Negative for congestion, sore throat and trouble swallowing.    Respiratory:  Negative for cough, chest tightness and shortness of breath.    Cardiovascular:  Negative for chest pain, palpitations and leg swelling.   Gastrointestinal:  Negative for abdominal pain, diarrhea, nausea and vomiting.   Genitourinary:  Negative for difficulty urinating, dysuria, frequency and urgency.   Musculoskeletal:  Positive for gait problem. Negative for arthralgias and back pain.   Skin:  Positive for color change and wound. Negative for pallor and rash.   Psychiatric/Behavioral:  Negative for agitation, behavioral problems and confusion.    All other systems reviewed and are negative.  Objective:     Vital Signs (Most Recent):  Temp: 96.6 °F (35.9 °C) (04/03/23 1417)  Pulse: 71 (04/03/23 1417)  Resp: 16 (04/03/23 1417)  BP: (!) 165/92 (04/03/23 1417)  SpO2: 95 % (04/03/23 1417)   Vital Signs (24h Range):  Temp:  [96.3 °F (35.7 °C)-97.6 °F (36.4 °C)] 96.6 °F (35.9 °C)  Pulse:  [63-72] 71  Resp:  [16-18] 16  SpO2:  [92 %-97 %] 95 %  BP: (117-165)/(56-92) 165/92     Weight: 104 kg (229 lb 4.5 oz)  Body mass index is 37.01 kg/m².    Intake/Output Summary (Last 24 hours) at 4/3/2023 1612  Last data filed at 4/3/2023  0715  Gross per 24 hour   Intake 540 ml   Output --   Net 540 ml        Physical Exam  Vitals and nursing note reviewed.   Constitutional:       General: He is not in acute distress.     Appearance: He is well-developed. He is ill-appearing (chronically ill appearing). He is not diaphoretic.   HENT:      Head: Normocephalic.      Comments: Significant ecchymosis in various stages to face, large hematoma to forehead, scattered abrasions to face     Right Ear: External ear normal.      Left Ear: External ear normal.      Nose: Nose normal. No congestion or rhinorrhea.      Mouth/Throat:      Mouth: Mucous membranes are moist.      Pharynx: Oropharynx is clear. No oropharyngeal exudate or posterior oropharyngeal erythema.   Eyes:      General: No scleral icterus.     Conjunctiva/sclera: Conjunctivae normal.      Pupils: Pupils are equal, round, and reactive to light.   Neck:      Vascular: No JVD.   Cardiovascular:      Rate and Rhythm: Normal rate and regular rhythm.      Pulses: Normal pulses.      Heart sounds: Normal heart sounds. No murmur heard.  Pulmonary:      Effort: Pulmonary effort is normal. No respiratory distress.      Breath sounds: Normal breath sounds. No stridor. No wheezing, rhonchi or rales.   Abdominal:      General: Bowel sounds are normal. There is no distension.      Palpations: Abdomen is soft.      Tenderness: There is no abdominal tenderness.   Musculoskeletal:         General: Swelling present. No tenderness.      Cervical back: Normal range of motion and neck supple.      Comments: Edema LUE   Skin:     General: Skin is warm and dry.      Capillary Refill: Capillary refill takes 2 to 3 seconds.      Coloration: Skin is not jaundiced or pale.      Findings: Bruising present. No erythema.      Comments: Significant bruising head to toe with scattered abrasions and skin tears   Neurological:      General: No focal deficit present.      Mental Status: He is alert and oriented to person, place,  and time.      Cranial Nerves: No cranial nerve deficit.      Sensory: No sensory deficit.      Motor: Weakness present.      Comments: Chronic generalized weakness to all exts, 2/5 strength   Psychiatric:         Mood and Affect: Mood normal.         Behavior: Behavior normal.         Thought Content: Thought content normal.       Significant Labs: All pertinent labs within the past 24 hours have been reviewed.  CBC:   Recent Labs   Lab 04/02/23  0502 04/03/23  0356   WBC 13.94* 8.85   HGB 8.7* 8.2*   HCT 25.7* 24.1*    278       CMP:   Recent Labs   Lab 04/02/23  0502 04/03/23  0356    141   K 4.1 3.8    111*   CO2 21* 22*   GLU 93 115*   BUN 36* 33*   CREATININE 1.3 1.4   CALCIUM 9.4 9.1   PROT 5.3* 5.0*   ALBUMIN 2.2* 2.1*   BILITOT 0.7 0.7   ALKPHOS 86 78   AST 32 30   ALT 19 20   ANIONGAP 9 8         Significant Imaging: I have reviewed all pertinent imaging results/findings within the past 24 hours.

## 2023-04-03 NOTE — PLAN OF CARE
Referrals sent to [facility], [facility], and [facility] for review. Will follow.      04/03/23 1600   Post-Acute Status   Post-Acute Authorization Placement   Post-Acute Placement Status Referrals Sent

## 2023-04-03 NOTE — PLAN OF CARE
POC/Meds reviewed, pt verbalized understanding. Vitals stable.  Afebrile.   Tele In place-[id number]. Accuchecks monitored.  ivf infusing. Repositions self. Hourly/Q2hr rounding performed, safety maintained. Bed in lowest position, wheels locked, SR up x2, call light in easy reach. No  complaints at this time. Will continue to monitor.     Provided with 8 oz juice and turkey sandwich         Jaylon Davies RN  09/20/22 1926

## 2023-04-03 NOTE — PLAN OF CARE
Recommendations   1) Continue DM 2000 kcal, cardiac diet   2) weigh weekly     Goals: 1) PO Intakes > 75% of meals at f/u  Nutrition Goal Status: new  Communication of RD Recs:  (POC, sticky note)

## 2023-04-03 NOTE — PT/OT/SLP PROGRESS
"Physical Therapy Treatment    Patient Name:  Richard Bustos   MRN:  [id number]    Recommendations:     Discharge Recommendations: rehabilitation facility, nursing facility, skilled  Discharge Equipment Recommendations: none  Barriers to discharge: Decreased caregiver support    Assessment:     Richard Bustos is a 77 y.o. male admitted with a medical diagnosis of Altered mental status.  He presents with the following impairments/functional limitations: weakness, impaired endurance, impaired functional mobility, impaired balance, decreased upper extremity function, decreased lower extremity function, decreased safety awareness, impaired skin, orthopedic precautions .    Pt seen supine in bed, alert, agreeable to PT. Period of anxiousness when being mobilized' feels like I'm falling". Pt calms down with encouragement. Pt seen for thera ex in supine. Rollling and EOB sitting assist x2 . Pt assisting with swinging legs off bed. Pt requiring extra time for midline position. Pt  able to sustain static sitting with min to close supervision. Pt has Aspen collar on when mobilized to EOB.   Pt to benefit from continued therapies SNF.      Rehab Prognosis: Fair; patient would benefit from acute skilled PT services to address these deficits and reach maximum level of function.    Recent Surgery: * No surgery found *      Plan:     During this hospitalization, patient to be seen 6 x/week to address the identified rehab impairments via therapeutic activities, therapeutic exercises, neuromuscular re-education and progress toward the following goals:    Plan of Care Expires:  04/15/23    Subjective   Pt agreeable to PT  Pt asking PT to place urinal as he has the urge to void  Stated of being fearful of falling  Chief Complaint: collar is uncomfortable- " hard to breath"  Patient/Family Comments/goals: get well  Pain/Comfort:  Pain Rating 1: 0/10      Objective:     Communicated with nurse Waite prior to session.  Patient found HOB " elevated with peripheral IV, bed alarm, telemetry upon PT entry to room.     General Precautions: Standard, fall  Orthopedic Precautions: spinal precautions  Braces: Aspen collar  Respiratory Status: Room air     Functional Mobility:  Bed Mobility:     Rolling Left:  moderate assistance  Rolling Right: moderate assistance  Scooting: maximal assistance  Supine to Sit: maximal assistance  Sit to Supine: maximal assistance and of 2 persons      AM-PAC 6 CLICK MOBILITY  Turning over in bed (including adjusting bedclothes, sheets and blankets)?: 2  Sitting down on and standing up from a chair with arms (e.g., wheelchair, bedside commode, etc.): 1  Moving from lying on back to sitting on the side of the bed?: 2  Moving to and from a bed to a chair (including a wheelchair)?: 1  Need to walk in hospital room?: 1  Climbing 3-5 steps with a railing?: 1  Basic Mobility Total Score: 8       Treatment & Education:  Patient was educated on the importance of OOB activity and functional mobility to negate negative effects of prolonged bed rest during hospitalization, safe transfers and ambulation, and D/C planning   Thera ex with AP,QS/GS,assisted SLR,abd/add and HS x 20 reps with rests  Aspen c collar placed and pt sat EOB assist x2 for safety. Pt assisting with swinging legs off bed  Pt completed LAQ exercises while seated EOB  Pt returned supine and to HOB. Pt assisting with scooting    Patient left HOB elevated with all lines intact, call button in reach, and chair alarm on..    GOALS:   Multidisciplinary Problems       Physical Therapy Goals          Problem: Physical Therapy    Goal Priority Disciplines Outcome Goal Variances Interventions   Physical Therapy Goal     PT, PT/OT Ongoing, Progressing     Description: Goals to be met by: 4/15/2023     Patient will increase functional independence with mobility by performin). Supine to sit with MInimal Assistance  2). Sit to supine with MInimal Assistance  3). Bed to chair  transfer with Moderate Assistance   4). Sitting at edge of bed x> 10 minutes with Contact Guard Assistance                         Time Tracking:     PT Received On: 04/03/23  PT Start Time: 1300     PT Stop Time: 1338  PT Total Time (min): 38 min     Billable Minutes: Therapeutic Exercise 10 and Neuromuscular Re-education 28    Treatment Type: Treatment  PT/PTA: PT     Number of PTA visits since last PT visit: 0     04/03/2023

## 2023-04-03 NOTE — CARE UPDATE
04/03/23 0753   Patient Assessment/Suction   Level of Consciousness (AVPU) alert   PRE-TX-O2   Device (Oxygen Therapy) room air   SpO2 96 %   Pulse Oximetry Type Intermittent   $ Pulse Oximetry - Multiple Charge Pulse Oximetry - Multiple   Pulse 72   Resp 16

## 2023-04-03 NOTE — PT/OT/SLP PROGRESS
Occupational Therapy   Treatment    Name: Richard Bustos  MRN: [id number]  Admitting Diagnosis:  Altered mental status       Recommendations:     Discharge Recommendations: rehabilitation facility, nursing facility, skilled  Discharge Equipment Recommendations:  other (see comments) (TBD next level of care)  Barriers to discharge:  None    Assessment:     Richard Bustos is a 77 y.o. male with a medical diagnosis of Altered mental status. Past medical history of hypertension, CAD s/p cardiac stents, lumbar DDD with chronic bilat LE weakness, HLD, gout, MI, congestive heart failure, arthritis, A-fib, on Coumadin, DM II, dementia, and MTHFR mutation.    Patient agreeable to participate in OT treatment session and agreeable to mobilize to edge of bed. Patient's 2 daughters present. Upon OTR entry, patient found supine without Aspen collar (found in 2 pieces in different areas of patient room); [place] collar donned prior to mobilization. Upon returning supine, anterior part of collar removed by daughter; posterior piece remaining in position. Supine<>sit with Max (A) x 2 - patient able to initiate advancement of B LE to edge of bed. Patient tolerated sitting EOB approximately 10 minutes with assistance ranging from Min to occasional Mod (A) to maintain midline. Assistance to maintain L UE in position of support at side and use of R UE on bed rail. Patient engaging in pelvic ROM, LE AROM exercises. OTR instructing on B UE HEP AROM/AAROM to be performed outside of scheduled therapy sessions. Patient performing AROM of R UE and AAROM of proximal L UE and AROM of distal (wrist/digits). He presents with the following performance deficits affecting function are weakness, impaired endurance, impaired self care skills, impaired functional mobility, decreased upper extremity function, decreased lower extremity function, decreased safety awareness, abnormal tone, decreased ROM, impaired coordination, impaired fine motor, orthopedic  precautions.     Recommend SNF/IRF placement upon discharge to facilitate patient's increased endurance, strength, functional mobility, and independence with ADL. Patient is participatory and has great family support.    Rehab Prognosis:  Good; patient would benefit from acute skilled OT services to address these deficits and reach maximum level of function.       Plan:     Patient to be seen 5 x/week to address the above listed problems via self-care/home management, therapeutic activities, therapeutic exercises  Plan of Care Expires: 04/29/23  Plan of Care Reviewed with: patient, other (see comments) (2 daughters)    Subjective     Chief Complaint: None  Patient/Family Comments/goals: To go back to [place] today. To get stronger and be able to walk  Pain/Comfort:  Pain Rating 1: 0/10    Objective:     Communicated with: Nurse prior to session.  Patient found HOB elevated with peripheral IV, bed alarm, telemetry (2 daughters at bedside) upon OT entry to room.    General Precautions: Standard, fall    Orthopedic Precautions:spinal precautions  Braces: Aspen collar (donned prior to mobility)  Respiratory Status: Room air     Occupational Performance:     Bed Mobility:  Aspen collar donned with HOB elevated prior to mobilization and maintained donned throughout session  Patient completed Rolling/Turning to Right with maximal assistance  Patient completed Scooting/Bridging with Max (A) x 2 with use of draw sheet  Patient completed Supine to Sit with Max (A) x 2 - patient able to initiate advancement of B LE towards EOB  Patient completed Sit to Supine with Max (A) x 2      AMPAC 6 Click ADL: 13    Treatment & Education:  - OTR providing reinforcement of education/instruction regarding OT role/POC, safety awareness/fall prevention including use of bed alarm and call button for staff assistance - patient/family verbalize understanding and in agreement  - OTR providing reinforcement of education/instruction regarding  spinal precautions, purpose/wear schedule of aspen collar, log roll technique, importance of assisted/supervised edge of mobility to counteract negative effects of prolonged bed rest/inactivity, importance of AAROM/AROM HEP B UE and LE bed exercises - patient verbalizes/demonstrates understanding and in agreement when appropriate  - While patient seated edge of bed, OTR instructing on R/L pelvic tilt exercises with assistance to maintain L UE in position of support at side and use of R UE on bed rail - patient performing 10 reps each direction; patient also performing long arch quad sets while edge of bed and attempting marches (per request of daughter)  - Once returned to bed with HOB elevated, OTR instructing on B UE AAROM/AROM HEP; patient performing 2 sets x 5 reps of the following: R UE AROM: shoulder abduction/flexion to shoulder level, elbow flexion/extension, wrist flexion/extension, open/close of hand with squeezes and emphasis on achieving full digit extension; also instructed on/performing thumb opposition and palmar abduction to best of ability; OTR reinforcing importance of LE bed exercises as well - ankle pumps, heel slides, and bridging - patient demonstrating at least 5 reps of each as well   - Patient left with HOB elevated, aspen collar doffed, all lines intact, call button within reach, and all needs met    Patient left HOB elevated with all lines intact, call button in reach, bed alarm on, NurseNicole, notified, and 2 daughters present    GOALS:   Multidisciplinary Problems       Occupational Therapy Goals          Problem: Occupational Therapy    Goal Priority Disciplines Outcome Interventions   Occupational Therapy Goal     OT, PT/OT Ongoing, Progressing    Description: Goals to be met by: 4/29/23     Patient will increase functional independence with ADLs by performing:    Feeding with Minimal Assistance.  UE Dressing with Moderate Assistance.  Grooming while seated with Minimal  Assistance.  Increased functional strength to WFL for participation in ADL's/IADL's.                         Time Tracking:     OT Date of Treatment: 04/03/23  OT Start Time: 1030  OT Stop Time: 1054  OT Total Time (min): 24 min    Billable Minutes:Therapeutic Exercise 24    OT/MARZENA: OT     Number of MARZENA visits since last OT visit: 0    4/3/2023

## 2023-04-04 LAB
ALBUMIN SERPL BCP-MCNC: 2.3 G/DL (ref 3.5–5.2)
ALP SERPL-CCNC: 88 U/L (ref 55–135)
ALT SERPL W/O P-5'-P-CCNC: 21 U/L (ref 10–44)
ANION GAP SERPL CALC-SCNC: 6 MMOL/L (ref 8–16)
AST SERPL-CCNC: 33 U/L (ref 10–40)
BILIRUB SERPL-MCNC: 0.7 MG/DL (ref 0.1–1)
BUN SERPL-MCNC: 26 MG/DL (ref 8–23)
CALCIUM SERPL-MCNC: 9.4 MG/DL (ref 8.7–10.5)
CHLORIDE SERPL-SCNC: 112 MMOL/L (ref 95–110)
CO2 SERPL-SCNC: 22 MMOL/L (ref 23–29)
CREAT SERPL-MCNC: 1.1 MG/DL (ref 0.5–1.4)
ERYTHROCYTE [DISTWIDTH] IN BLOOD BY AUTOMATED COUNT: 15.9 % (ref 11.5–14.5)
EST. GFR  (NO RACE VARIABLE): >60 ML/MIN/1.73 M^2
GLUCOSE SERPL-MCNC: 115 MG/DL (ref 70–110)
HCT VFR BLD AUTO: 25.5 % (ref 40–54)
HGB BLD-MCNC: 8.7 G/DL (ref 14–18)
INR PPP: 1.2 (ref 0.8–1.2)
MCH RBC QN AUTO: 33.9 PG (ref 27–31)
MCHC RBC AUTO-ENTMCNC: 34.1 G/DL (ref 32–36)
MCV RBC AUTO: 99 FL (ref 82–98)
PLATELET # BLD AUTO: 311 K/UL (ref 150–450)
PMV BLD AUTO: 9.6 FL (ref 9.2–12.9)
POCT GLUCOSE: 106 MG/DL (ref 70–110)
POCT GLUCOSE: 106 MG/DL (ref 70–110)
POCT GLUCOSE: 118 MG/DL (ref 70–110)
POTASSIUM SERPL-SCNC: 4.1 MMOL/L (ref 3.5–5.1)
PROT SERPL-MCNC: 5.4 G/DL (ref 6–8.4)
PROTHROMBIN TIME: 13 SEC (ref 9–12.5)
RBC # BLD AUTO: 2.57 M/UL (ref 4.6–6.2)
SODIUM SERPL-SCNC: 140 MMOL/L (ref 136–145)
WBC # BLD AUTO: 9.07 K/UL (ref 3.9–12.7)

## 2023-04-04 PROCEDURE — 94761 N-INVAS EAR/PLS OXIMETRY MLT: CPT

## 2023-04-04 PROCEDURE — 80053 COMPREHEN METABOLIC PANEL: CPT | Performed by: NURSE PRACTITIONER

## 2023-04-04 PROCEDURE — 97110 THERAPEUTIC EXERCISES: CPT

## 2023-04-04 PROCEDURE — 97112 NEUROMUSCULAR REEDUCATION: CPT

## 2023-04-04 PROCEDURE — 36415 COLL VENOUS BLD VENIPUNCTURE: CPT | Performed by: HOSPITALIST

## 2023-04-04 PROCEDURE — 85610 PROTHROMBIN TIME: CPT | Performed by: HOSPITALIST

## 2023-04-04 PROCEDURE — 25000003 PHARM REV CODE 250: Performed by: NURSE PRACTITIONER

## 2023-04-04 PROCEDURE — G0378 HOSPITAL OBSERVATION PER HR: HCPCS

## 2023-04-04 PROCEDURE — 85027 COMPLETE CBC AUTOMATED: CPT | Performed by: NURSE PRACTITIONER

## 2023-04-04 RX ADMIN — WARFARIN SODIUM 5 MG: 5 TABLET ORAL at 04:04

## 2023-04-04 RX ADMIN — FERROUS SULFATE TAB 325 MG (65 MG ELEMENTAL FE) 1 EACH: 325 (65 FE) TAB at 09:04

## 2023-04-04 RX ADMIN — Medication 1 TABLET: at 09:04

## 2023-04-04 RX ADMIN — AMIODARONE HYDROCHLORIDE 200 MG: 100 TABLET ORAL at 09:04

## 2023-04-04 RX ADMIN — PANTOPRAZOLE SODIUM 40 MG: 40 TABLET, DELAYED RELEASE ORAL at 09:04

## 2023-04-04 RX ADMIN — FAMOTIDINE 40 MG: 20 TABLET, FILM COATED ORAL at 09:04

## 2023-04-04 RX ADMIN — FLUTICASONE PROPIONATE 100 MCG: 50 SPRAY, METERED NASAL at 09:04

## 2023-04-04 RX ADMIN — ALLOPURINOL 100 MG: 100 TABLET ORAL at 09:04

## 2023-04-04 RX ADMIN — CALCITRIOL CAPSULES 0.25 MCG 0.75 MCG: 0.25 CAPSULE ORAL at 09:04

## 2023-04-04 RX ADMIN — ATORVASTATIN CALCIUM 80 MG: 40 TABLET, FILM COATED ORAL at 09:04

## 2023-04-04 RX ADMIN — ASPIRIN 81 MG: 81 TABLET, COATED ORAL at 09:04

## 2023-04-04 RX ADMIN — OXYBUTYNIN CHLORIDE 5 MG: 5 TABLET, EXTENDED RELEASE ORAL at 09:04

## 2023-04-04 RX ADMIN — Medication 400 MG: at 09:04

## 2023-04-04 RX ADMIN — LISINOPRIL 20 MG: 10 TABLET ORAL at 09:04

## 2023-04-04 RX ADMIN — CLOPIDOGREL BISULFATE 75 MG: 75 TABLET, FILM COATED ORAL at 09:04

## 2023-04-04 NOTE — CARE UPDATE
04/04/23 0802   Patient Assessment/Suction   Level of Consciousness (AVPU) alert   Respiratory Effort Normal;Unlabored   Expansion/Accessory Muscles/Retractions no use of accessory muscles;no retractions   All Lung Fields Breath Sounds diminished   Rhythm/Pattern, Respiratory unlabored;pattern regular;depth regular   Cough Frequency no cough   PRE-TX-O2   Device (Oxygen Therapy) room air   SpO2 95 %   Pulse Oximetry Type Intermittent   $ Pulse Oximetry - Multiple Charge Pulse Oximetry - Multiple   Pulse 83   Resp 16   Positioning HOB elevated 30 degrees

## 2023-04-04 NOTE — HOSPITAL COURSE
Richard Bustos is a 77 year old male with a past medical history of HTN, CAD s/p PCI, HLD, CHF, Afib, DM, obesity, MTHFR mutation, recent cervical neck fracture s/p repair, dementia, gout, and OA who presented with altered mental status from [facility] thought to be secondary to opiate overuse. His mental status improved during his course and he was able to work with PT/OT. He was discharged back to SNF 4/6/2023 and will follow up with his PCP, Neurosurgery, Wound Care, and Hematology.

## 2023-04-04 NOTE — PLAN OF CARE
Care plan and meds reviewed. Pt verbalized understanding. VSS throughout shift. Pt on room air. Bedbound with brief. Wound care performed per wound care nurse. Repositions self. Safety maintained. Call light within reach. No complaints at this time.

## 2023-04-04 NOTE — PROGRESS NOTES
"[facility] Medicine  Progress Note    Patient Name: Richard Bustos  MRN: [id number]  Patient Class: OP- Observation   Admission Date: 3/31/2023  Length of Stay: 0 days  Attending Physician: Heather Shah MD  Primary Care Provider: Familia Ramirez Jr, MD        Subjective:     Principal Problem:Altered mental status        HPI:  Richard Bustos is a 77 yr old male with PMHx significant for  PMHx of HTN, CAD s/p cardiac stents, lumbar DDD with chronic bilat LE weakness, HLD, gout, MI, CHF, arthritis, A-fib, on Coumadin, DM II, dementia, and MTHFR mutation who presents to the ED via EMS from [facility].  History obtained by ED physician due to pts underlying dementia. Per ED staff daughter reports after arriving to the detention earlier today, she noticed the patient was "starring off into space" ~20 minutes after a PT session.  EMS states upon their arrival on scene, staff mentioned the patient's respiratory rate dropped to 6 with an associated systolic pressure of 70 mmHg.  EMS reported the nursing home personnel described an episode "resembling a seizure without jerking" prior to EMS arrival.  Pt had  normal CBG en route to the ED.  Upon arrival to ED pt was awake and alert and responding appropriately and bp was stable. Per chart review pt experienced a fall on 03/17/2023 which resulted in cervical fracture with cord edema and pt underwent cervical spine fusion performed on 03/24/2023 by Dr. Kc. Pt was d/c to skilled Nursing Facility 3 days ago.  Patient recalls working with PT this morning.  He reports he received 2 pain pills prior to his PT session and this caused him extreme fatigue and he believes this is the cause of his episode today.  Patient denies any acute complaints at this time.  He reports chronic weakness that is the same as prior to his surgery.  Patient is pleasantly confused but oriented to name, date of birth and year. Family not present at BS. Pt reports he " feels back as baseline. Pt will be admitted to hospital medicine services for further workup and management.      Overview/Hospital Course:  No notes on file    Interval History:  Notes reviewed, no acute events overnight.  Patient resting comfortably in bed.  He is pleasantly confused this morning but reorients very well. He shows signs of sundowning but has been very calm and cooperative during his stay.   He understands he is in the hospital and needs to continue therapy.  I opened patient's shades, turned on his TV, readjusted him in bed and place his food tray in front of him.  Patient was able to feed himself but still having some significant weakness and difficulty with ADLs.  Discussed with case management and patient is being denied by multiple SNF due to high level of care.  I offered to speak to medical directors at facilities to give them my assessment of pt and as I feel that with continued therapy he will continue to get stronger and be able to return back to home with family which is patient's goal.  Will continue to monitor closely and await dispo planning decision.     Review of Systems   Constitutional:  Positive for fatigue. Negative for chills and fever.   HENT:  Negative for congestion, sore throat and trouble swallowing.    Respiratory:  Negative for cough, chest tightness and shortness of breath.    Cardiovascular:  Negative for chest pain, palpitations and leg swelling.   Gastrointestinal:  Negative for abdominal pain, diarrhea, nausea and vomiting.   Genitourinary:  Negative for difficulty urinating, dysuria, frequency and urgency.   Musculoskeletal:  Positive for gait problem. Negative for arthralgias and back pain.   Skin:  Positive for color change and wound. Negative for pallor and rash.   Psychiatric/Behavioral:  Positive for confusion. Negative for agitation and behavioral problems.    All other systems reviewed and are negative.  Objective:     Vital Signs (Most Recent):  Temp: 97.9  °F (36.6 °C) (04/04/23 0711)  Pulse: 83 (04/04/23 0802)  Resp: 16 (04/04/23 0802)  BP: (!) 157/81 (04/04/23 0711)  SpO2: 95 % (04/04/23 0802)   Vital Signs (24h Range):  Temp:  [96.3 °F (35.7 °C)-98.2 °F (36.8 °C)] 97.9 °F (36.6 °C)  Pulse:  [63-85] 83  Resp:  [16-18] 16  SpO2:  [93 %-97 %] 95 %  BP: (119-177)/(59-92) 157/81     Weight: 104 kg (229 lb 4.5 oz)  Body mass index is 37.01 kg/m².    Intake/Output Summary (Last 24 hours) at 4/4/2023 1050  Last data filed at 4/3/2023 1730  Gross per 24 hour   Intake 240 ml   Output 300 ml   Net -60 ml        Physical Exam  Vitals and nursing note reviewed.   Constitutional:       General: He is not in acute distress.     Appearance: He is well-developed. He is ill-appearing (chronically ill appearing). He is not diaphoretic.   HENT:      Head: Normocephalic.      Comments: Significant ecchymosis in various stages to face, large hematoma to forehead, scattered abrasions to face     Right Ear: External ear normal.      Left Ear: External ear normal.      Nose: Nose normal. No congestion or rhinorrhea.      Mouth/Throat:      Mouth: Mucous membranes are moist.      Pharynx: Oropharynx is clear. No oropharyngeal exudate or posterior oropharyngeal erythema.   Eyes:      General: No scleral icterus.     Conjunctiva/sclera: Conjunctivae normal.      Pupils: Pupils are equal, round, and reactive to light.   Neck:      Vascular: No JVD.   Cardiovascular:      Rate and Rhythm: Normal rate and regular rhythm.      Pulses: Normal pulses.      Heart sounds: Normal heart sounds. No murmur heard.  Pulmonary:      Effort: Pulmonary effort is normal. No respiratory distress.      Breath sounds: Normal breath sounds. No stridor. No wheezing, rhonchi or rales.   Abdominal:      General: Bowel sounds are normal. There is no distension.      Palpations: Abdomen is soft.      Tenderness: There is no abdominal tenderness.   Musculoskeletal:         General: Swelling present. No tenderness.       Cervical back: Normal range of motion and neck supple.      Comments: Edema LUE   Skin:     General: Skin is warm and dry.      Capillary Refill: Capillary refill takes 2 to 3 seconds.      Coloration: Skin is not jaundiced or pale.      Findings: Bruising present. No erythema.      Comments: Significant bruising head to toe with scattered abrasions and skin tears   Neurological:      General: No focal deficit present.      Mental Status: He is alert and oriented to person, place, and time.      Cranial Nerves: No cranial nerve deficit.      Sensory: No sensory deficit.      Motor: Weakness present.      Comments: Chronic generalized weakness to all exts, pt experiencing sundowning with confusion. Easily reorients to situation, oriented x 3 this morning.    Psychiatric:         Mood and Affect: Mood normal.         Behavior: Behavior normal.         Thought Content: Thought content normal.       Significant Labs: All pertinent labs within the past 24 hours have been reviewed.  CBC:   Recent Labs   Lab 04/03/23  0356 04/04/23  0348   WBC 8.85 9.07   HGB 8.2* 8.7*   HCT 24.1* 25.5*    311       CMP:   Recent Labs   Lab 04/03/23  0356 04/04/23  0348    140   K 3.8 4.1   * 112*   CO2 22* 22*   * 115*   BUN 33* 26*   CREATININE 1.4 1.1   CALCIUM 9.1 9.4   PROT 5.0* 5.4*   ALBUMIN 2.1* 2.3*   BILITOT 0.7 0.7   ALKPHOS 78 88   AST 30 33   ALT 20 21   ANIONGAP 8 6*         Significant Imaging: I have reviewed all pertinent imaging results/findings within the past 24 hours.      Assessment/Plan:      * Altered mental status  Pt experienced episode of altered mental status with decreased LOC after receiving 2 pain pills and undergoing physical therapy  Suspect this is etiology of episode   Patient back at baseline currently   Will obtain EEG and consult Neurology   Patient can not go under MRI due to recent cervical instrumentation   CT head reviewed and negative for acute findings    4/1 - patient's  mentation stable today.  Suspect episode yesterday was due to narcotics administered.  Will hold all opiates and continue to monitor overnight and if patient remains stable plan to discharge back to [place] tomorrow.    4/2 -pts mentation remains much improved and stable today. Will cont to monitor closely and plan to d/c back to [facility] tomorrow.     4/4 - pt has int confusion and sundowning, suspect hospital delirium due to prolonged stay. Pt calm and cooperative. Reorients to situation well and is oriented x 3. Will cont to monitor closely.         Anemia  Patient's anemia is currently controlled. Has not received any PRBCs to date.. Etiology likely d/t chronic disease and acute blood loss from surgery.  Current CBC reviewed-   Lab Results   Component Value Date    HGB 8.4 (L) 04/01/2023    HCT 25.3 (L) 04/01/2023     Monitor serial CBC and transfuse if patient becomes hemodynamically unstable, symptomatic or H/H drops below 7/21.     Check iron panel in AM        Closed nondisplaced fracture of sixth cervical vertebra  Per chart review pt experienced a fall on 03/17/2023 which resulted in cervical fracture with cord edema and pt underwent cervical spine fusion performed on 03/24/2023 by Dr. Kc. Pt was d/c to skilled Nursing Facility 3 days ago.   Cont PT/OT  Fall precautions      Generalized weakness  Chronic, pt with increasing deconditioning since fall and cervical fracture  Cont PT/OT  Fall precautions      Paroxysmal atrial fibrillation  Patient with Paroxysmal (<7 days) atrial fibrillation which is controlled currently with Amiodarone. Patient is currently in sinus rhythm.[id number] Score: 4. Anticoagulation indicated. Anticoagulation done with coumadin.    Pts INR subtherapeutic, increase dose to 5mg and monitor        Hyperlipidemia associated with type 2 diabetes mellitus  Chronic, stable  Cont home meds    Hypertension associated with diabetes  Chronic, controlled.  Latest blood pressure and  vitals reviewed-   Temp:  [96.3 °F (35.7 °C)-98 °F (36.7 °C)]   Pulse:  []   Resp:  [17-20]   BP: (100-157)/(59-89)   SpO2:  [95 %-96 %] .   Home meds for hypertension were reviewed and noted below.   Hypertension Medications             carvediloL (COREG) 25 MG tablet Take 1 tablet (25 mg total) by mouth 2 (two) times daily with meals.    lisinopriL (PRINIVIL,ZESTRIL) 40 MG tablet Take 1 tablet (40 mg total) by mouth every evening.    nitroGLYCERIN 0.4 MG/DOSE TL SPRY (NITROLINGUAL) 400 mcg/spray spray PLACE 1 SPRAY UNDER THE TONGUE EVERY 5 MINUTES AS NEEDED FOR CHEST PAIN          While in the hospital, will manage blood pressure as follows; Continue home antihypertensive regimen    Will utilize p.r.n. blood pressure medication only if patient's blood pressure greater than  180/110 and he develops symptoms such as worsening chest pain or shortness of breath.    4/1 - blood pressure on lower side today, medications adjusted and will continue amiodarone and lisinopril dose decreased to 20 mg nightly.  Will continue monitor closely.  Holding beta-blocker due to mild bradycardia and low blood pressure.    MTHFR mutation (methylenetetrahydrofolate reductase)  Cont coumadin  Monitor labs      Diabetes mellitus with chronic kidney disease, stage III  Patient's FSGs are controlled on current medication regimen.  Last A1c reviewed-   Lab Results   Component Value Date    LABA1C 6.6 (H) 08/08/2016    HGBA1C 5.1 01/06/2022     Most recent fingerstick glucose reviewed- No results for input(s): POCTGLUCOSE in the last 24 hours.  Current correctional scale  Low  Maintain anti-hyperglycemic dose as follows-   Antihyperglycemics (From admission, onward)    None        Hold Oral hypoglycemics while patient is in the hospital.      VTE Risk Mitigation (From admission, onward)         Ordered     warfarin (COUMADIN) tablet 5 mg  Daily         04/03/23 1615     IP VTE HIGH RISK PATIENT  Once         03/31/23 2030     Place  sequential compression device  Until discontinued         03/31/23 2030     Reason for No Pharmacological VTE Prophylaxis  Once        Question:  Reasons:  Answer:  Already adequately anticoagulated on oral Anticoagulants    03/31/23 2030                Discharge Planning   ORACIO: 4/3/2023     Code Status: Full Code   Is the patient medically ready for discharge?:     Reason for patient still in hospital (select all that apply): Pending disposition  Discharge Plan A: Skilled Nursing Facility                  Adeline Hammonds NP  Department of Hospital Medicine   [facility]

## 2023-04-04 NOTE — PLAN OF CARE
ERIC spoke with Elaine with [facility], she stated she will look at referral and update if she will accept. Also spoke with Riri with [facility] who stated she will look at pt again for possible placement. [facility] will not take pt at this time. REIC following     04/04/23 1218   Post-Acute Status   Post-Acute Authorization Placement   Post-Acute Placement Status Pending post-acute provider review/more information requested

## 2023-04-04 NOTE — PLAN OF CARE
[facility]  Discharge Reassessment    Primary Care Provider: Familia Ramirez Jr, MD    Expected Discharge Date: 4/3/2023    Per Shannon with [facility], they are no longer able to accept pt. NATHALIE Muller updated. New SNF referrals sent (see note). CM following.     Reassessment (most recent)       Discharge Reassessment - 04/03/23 1602          Discharge Reassessment    Assessment Type Discharge Planning Reassessment     Did the patient's condition or plan change since previous assessment? No     Discharge Plan discussed with: Adult children     Communicated ORACIO with patient/caregiver Yes     Discharge Plan A Skilled Nursing Facility     Discharge Plan B Home Health     DME Needed Upon Discharge  none     Discharge Barriers Identified Nursing Home rejection     Why the patient remains in the hospital Placement issues

## 2023-04-04 NOTE — PLAN OF CARE
Pt denied by [facility], [facility], [facility], [facility], [facility], and [facility].    Per Sadie with [facility] in [place], they are able to accept pt for LTAC and she will submit to N for auth. CM following.     04/04/23 2265   Post-Acute Status   Post-Acute Authorization Placement   Post-Acute Placement Status Pending payor review/awaiting authorization (if required)

## 2023-04-04 NOTE — PLAN OF CARE
Problem: Physical Therapy  Goal: Physical Therapy Goal  Description: Goals to be met by: 4/15/2023     Patient will increase functional independence with mobility by performin). Supine to sit with MInimal Assistance  2). Sit to supine with MInimal Assistance  3). Bed to chair transfer with Moderate Assistance   4). Sitting at edge of bed x> 10 minutes with Contact Guard Assistance    Outcome: Ongoing, Progressing   Pt seen for thera ex in supine. EOB sitting max assist and min to close supervision for balance. [place] collar on. SNF/LTAC

## 2023-04-04 NOTE — PROGRESS NOTES
Per Wound care, pt also with stage 3 PI ( possible unstageable) to sacral area. Adding Boost glucose control daily and Liquacel daily.

## 2023-04-04 NOTE — PLAN OF CARE
Pt denied by [facility] and [facility]    Referrals sent to:  [facility]    CM spoke to pt's daughter Citlali, who is ok with plan as of now. Following.     04/04/23 0945   Post-Acute Status   Post-Acute Authorization Placement   Post-Acute Placement Status Referrals Sent

## 2023-04-04 NOTE — ASSESSMENT & PLAN NOTE
Pt experienced episode of altered mental status with decreased LOC after receiving 2 pain pills and undergoing physical therapy  Suspect this is etiology of episode   Patient back at baseline currently   Will obtain EEG and consult Neurology   Patient can not go under MRI due to recent cervical instrumentation   CT head reviewed and negative for acute findings    4/1 - patient's mentation stable today.  Suspect episode yesterday was due to narcotics administered.  Will hold all opiates and continue to monitor overnight and if patient remains stable plan to discharge back to [facility] tomorrow.    4/2 -pts mentation remains much improved and stable today. Will cont to monitor closely and plan to d/c back to [facility] tomorrow.     4/4 - pt has int confusion and sundowning, suspect hospital delirium due to prolonged stay. Pt calm and cooperative. Reorients to situation well and is oriented x 3. Will cont to monitor closely.

## 2023-04-04 NOTE — SUBJECTIVE & OBJECTIVE
Interval History:  Notes reviewed, no acute events overnight.  Patient resting comfortably in bed.  He is pleasantly confused this morning but reorients very well. He shows signs of sundowning but has been very calm and cooperative during his stay.   He understands he is in the hospital and needs to continue therapy.  I opened patient's shades, turned on his TV, readjusted him in bed and place his food tray in front of him.  Patient was able to feed himself but still having some significant weakness and difficulty with ADLs.  Discussed with case management and patient is being denied by multiple SNF due to high level of care.  I offered to speak to medical directors at facilities to give them my assessment of pt and as I feel that with continued therapy he will continue to get stronger and be able to return back to home with family which is patient's goal.  Will continue to monitor closely and await dispo planning decision.     Review of Systems   Constitutional:  Positive for fatigue. Negative for chills and fever.   HENT:  Negative for congestion, sore throat and trouble swallowing.    Respiratory:  Negative for cough, chest tightness and shortness of breath.    Cardiovascular:  Negative for chest pain, palpitations and leg swelling.   Gastrointestinal:  Negative for abdominal pain, diarrhea, nausea and vomiting.   Genitourinary:  Negative for difficulty urinating, dysuria, frequency and urgency.   Musculoskeletal:  Positive for gait problem. Negative for arthralgias and back pain.   Skin:  Positive for color change and wound. Negative for pallor and rash.   Psychiatric/Behavioral:  Positive for confusion. Negative for agitation and behavioral problems.    All other systems reviewed and are negative.  Objective:     Vital Signs (Most Recent):  Temp: 97.9 °F (36.6 °C) (04/04/23 0711)  Pulse: 83 (04/04/23 0802)  Resp: 16 (04/04/23 0802)  BP: (!) 157/81 (04/04/23 0711)  SpO2: 95 % (04/04/23 0802)   Vital Signs (24h  Range):  Temp:  [96.3 °F (35.7 °C)-98.2 °F (36.8 °C)] 97.9 °F (36.6 °C)  Pulse:  [63-85] 83  Resp:  [16-18] 16  SpO2:  [93 %-97 %] 95 %  BP: (119-177)/(59-92) 157/81     Weight: 104 kg (229 lb 4.5 oz)  Body mass index is 37.01 kg/m².    Intake/Output Summary (Last 24 hours) at 4/4/2023 1050  Last data filed at 4/3/2023 1730  Gross per 24 hour   Intake 240 ml   Output 300 ml   Net -60 ml        Physical Exam  Vitals and nursing note reviewed.   Constitutional:       General: He is not in acute distress.     Appearance: He is well-developed. He is ill-appearing (chronically ill appearing). He is not diaphoretic.   HENT:      Head: Normocephalic.      Comments: Significant ecchymosis in various stages to face, large hematoma to forehead, scattered abrasions to face     Right Ear: External ear normal.      Left Ear: External ear normal.      Nose: Nose normal. No congestion or rhinorrhea.      Mouth/Throat:      Mouth: Mucous membranes are moist.      Pharynx: Oropharynx is clear. No oropharyngeal exudate or posterior oropharyngeal erythema.   Eyes:      General: No scleral icterus.     Conjunctiva/sclera: Conjunctivae normal.      Pupils: Pupils are equal, round, and reactive to light.   Neck:      Vascular: No JVD.   Cardiovascular:      Rate and Rhythm: Normal rate and regular rhythm.      Pulses: Normal pulses.      Heart sounds: Normal heart sounds. No murmur heard.  Pulmonary:      Effort: Pulmonary effort is normal. No respiratory distress.      Breath sounds: Normal breath sounds. No stridor. No wheezing, rhonchi or rales.   Abdominal:      General: Bowel sounds are normal. There is no distension.      Palpations: Abdomen is soft.      Tenderness: There is no abdominal tenderness.   Musculoskeletal:         General: Swelling present. No tenderness.      Cervical back: Normal range of motion and neck supple.      Comments: Edema LUE   Skin:     General: Skin is warm and dry.      Capillary Refill: Capillary  refill takes 2 to 3 seconds.      Coloration: Skin is not jaundiced or pale.      Findings: Bruising present. No erythema.      Comments: Significant bruising head to toe with scattered abrasions and skin tears   Neurological:      General: No focal deficit present.      Mental Status: He is alert and oriented to person, place, and time.      Cranial Nerves: No cranial nerve deficit.      Sensory: No sensory deficit.      Motor: Weakness present.      Comments: Chronic generalized weakness to all exts, pt experiencing sundowning with confusion. Easily reorients to situation, oriented x 3 this morning.    Psychiatric:         Mood and Affect: Mood normal.         Behavior: Behavior normal.         Thought Content: Thought content normal.       Significant Labs: All pertinent labs within the past 24 hours have been reviewed.  CBC:   Recent Labs   Lab 04/03/23  0356 04/04/23  0348   WBC 8.85 9.07   HGB 8.2* 8.7*   HCT 24.1* 25.5*    311       CMP:   Recent Labs   Lab 04/03/23  0356 04/04/23  0348    140   K 3.8 4.1   * 112*   CO2 22* 22*   * 115*   BUN 33* 26*   CREATININE 1.4 1.1   CALCIUM 9.1 9.4   PROT 5.0* 5.4*   ALBUMIN 2.1* 2.3*   BILITOT 0.7 0.7   ALKPHOS 78 88   AST 30 33   ALT 20 21   ANIONGAP 8 6*         Significant Imaging: I have reviewed all pertinent imaging results/findings within the past 24 hours.

## 2023-04-04 NOTE — PLAN OF CARE
POC/Meds reviewed, pt verbalized understanding. Vitals stable.  Afebrile.   Tele In place-[id number]. Accuchecks monitored.  ivf infusing.  pt has been rubbing his feet together and caused a sore to top of foot. Picture placed in chart. Sos done. Repositions self. Hourly/Q2hr rounding performed, safety maintained. Bed in lowest position, wheels locked, SR up x2, call light in easy reach. No  complaints at this time. Will continue to monitor.

## 2023-04-04 NOTE — PLAN OF CARE
Called [facility]. They are unable to take the pt back- unable to meet needs/too high level of care. Their medical director said the pt needs LTAC. SNF referrals were extended and LTAC referrals were sent. [facility] following       04/04/23 1021   Post-Acute Status   Post-Acute Authorization Placement   Post-Acute Placement Status Referrals Sent

## 2023-04-04 NOTE — PT/OT/SLP PROGRESS
Physical Therapy Treatment    Patient Name:  Richard Bustos   MRN:  [id number]    Recommendations:     Discharge Recommendations: nursing facility, skilled, LTACH (long-term acute care hospital)  Discharge Equipment Recommendations: none  Barriers to discharge: Decreased caregiver support    Assessment:     Richard Bustos is a 77 y.o. male admitted with a medical diagnosis of Altered mental status.  He presents with the following impairments/functional limitations: weakness, impaired endurance, impaired functional mobility, impaired balance, decreased upper extremity function, decreased lower extremity function, decreased safety awareness, impaired skin, orthopedic precautions .    Pt awake- disoriented, pt stated did not sleep well last night. Pt with persistent facial ecchymosis and raw skin anterior distal R lower leg- pt has the habit of crossing legs and rubbing heels on distal leg. Nurse Zack at bedside and obtained bilateral heel protector boots and applied. Pt seen for thera ex to LE's. Pt able to complete task with cueing and assistance. EOB sitting with max assist and extra time for midline position. Pt able to sustain static sitting with min to close supervision. Aspen collar on for mobilization.  Pt to benefit from continued therapies.    Rehab Prognosis: Fair; patient would benefit from acute skilled PT services to address these deficits and reach maximum level of function.    Recent Surgery: * No surgery found *      Plan:     During this hospitalization, patient to be seen 6 x/week to address the identified rehab impairments via therapeutic activities, therapeutic exercises, neuromuscular re-education and progress toward the following goals:    Plan of Care Expires:  04/15/23    Subjective     Chief Complaint: did not rest well- pt falling asleep post PT session this am  Patient/Family Comments/goals: none stated  Pain/Comfort:  Pain Rating 1: 0/10      Objective:     Communicated with nurse zack prior to  session.  Patient found HOB elevated with peripheral IV, bed alarm, telemetry upon PT entry to room.     General Precautions: Standard, fall  Orthopedic Precautions: spinal precautions  Braces: Aspen collar  Respiratory Status: Room air     Functional Mobility:  Bed Mobility:     Rolling Left:  moderate assistance  Rolling Right: moderate assistance  Scooting: maximal assistance  Supine to Sit: maximal assistance  Sit to Supine: maximal assistance and of 2 persons      AM-PAC 6 CLICK MOBILITY          Treatment & Education:  Patient was educated on the importance of OOB activity and functional mobility to negate negative effects of prolonged bed rest during hospitalization, safe transfers and ambulation, and D/C planning   Thera ex with AP,QS/GS, assisted HS,SLR, abd/add x 10-20 reps  EOB sitting max assist   Pt able to sustain sitting balance with close supervision. Aspen collar on. Pt with confusion/disorientation    Patient left HOB elevated with all lines intact, call button in reach, bed alarm off, and nurse Zack and CNA present..    GOALS:   Multidisciplinary Problems       Physical Therapy Goals          Problem: Physical Therapy    Goal Priority Disciplines Outcome Goal Variances Interventions   Physical Therapy Goal     PT, PT/OT Ongoing, Progressing     Description: Goals to be met by: 4/15/2023     Patient will increase functional independence with mobility by performin). Supine to sit with MInimal Assistance  2). Sit to supine with MInimal Assistance  3). Bed to chair transfer with Moderate Assistance   4). Sitting at edge of bed x> 10 minutes with Contact Guard Assistance                         Time Tracking:     PT Received On: 23  PT Start Time: 1037     PT Stop Time: 1107  PT Total Time (min): 30 min     Billable Minutes: Therapeutic Exercise 15 and Neuromuscular Re-education 15    Treatment Type: Treatment  PT/PTA: PT     Number of PTA visits since last PT visit: 0     2023

## 2023-04-05 PROBLEM — R41.82 ALTERED MENTAL STATUS: Status: RESOLVED | Noted: 2023-03-31 | Resolved: 2023-04-05

## 2023-04-05 PROBLEM — J18.9 PNEUMONIA: Status: RESOLVED | Noted: 2023-03-20 | Resolved: 2023-04-05

## 2023-04-05 LAB
BACTERIA BLD CULT: NORMAL
BACTERIA BLD CULT: NORMAL
INR PPP: 1.5 (ref 0.8–1.2)
POCT GLUCOSE: 103 MG/DL (ref 70–110)
POCT GLUCOSE: 136 MG/DL (ref 70–110)
POCT GLUCOSE: 89 MG/DL (ref 70–110)
POCT GLUCOSE: 97 MG/DL (ref 70–110)
PROTHROMBIN TIME: 15.9 SEC (ref 9–12.5)

## 2023-04-05 PROCEDURE — 99232 PR SUBSEQUENT HOSPITAL CARE,LEVL II: ICD-10-PCS | Mod: ,,, | Performed by: FAMILY MEDICINE

## 2023-04-05 PROCEDURE — 94761 N-INVAS EAR/PLS OXIMETRY MLT: CPT

## 2023-04-05 PROCEDURE — 25000003 PHARM REV CODE 250: Performed by: NURSE PRACTITIONER

## 2023-04-05 PROCEDURE — 92526 ORAL FUNCTION THERAPY: CPT

## 2023-04-05 PROCEDURE — 97110 THERAPEUTIC EXERCISES: CPT | Mod: 59

## 2023-04-05 PROCEDURE — 25000003 PHARM REV CODE 250: Performed by: STUDENT IN AN ORGANIZED HEALTH CARE EDUCATION/TRAINING PROGRAM

## 2023-04-05 PROCEDURE — 97535 SELF CARE MNGMENT TRAINING: CPT

## 2023-04-05 PROCEDURE — 92610 EVALUATE SWALLOWING FUNCTION: CPT

## 2023-04-05 PROCEDURE — 97530 THERAPEUTIC ACTIVITIES: CPT | Mod: CQ,59

## 2023-04-05 PROCEDURE — 99232 SBSQ HOSP IP/OBS MODERATE 35: CPT | Mod: ,,, | Performed by: FAMILY MEDICINE

## 2023-04-05 PROCEDURE — 36415 COLL VENOUS BLD VENIPUNCTURE: CPT | Performed by: HOSPITALIST

## 2023-04-05 PROCEDURE — 85610 PROTHROMBIN TIME: CPT | Performed by: HOSPITALIST

## 2023-04-05 PROCEDURE — G0378 HOSPITAL OBSERVATION PER HR: HCPCS

## 2023-04-05 RX ORDER — LANOLIN ALCOHOL/MO/W.PET/CERES
1 CREAM (GRAM) TOPICAL EVERY OTHER DAY
Status: DISCONTINUED | OUTPATIENT
Start: 2023-04-06 | End: 2023-04-06 | Stop reason: HOSPADM

## 2023-04-05 RX ORDER — CARVEDILOL 6.25 MG/1
25 TABLET ORAL 2 TIMES DAILY
Status: DISCONTINUED | OUTPATIENT
Start: 2023-04-05 | End: 2023-04-06

## 2023-04-05 RX ADMIN — FLUTICASONE PROPIONATE 100 MCG: 50 SPRAY, METERED NASAL at 08:04

## 2023-04-05 RX ADMIN — CARVEDILOL 25 MG: 6.25 TABLET, FILM COATED ORAL at 08:04

## 2023-04-05 RX ADMIN — AMIODARONE HYDROCHLORIDE 200 MG: 100 TABLET ORAL at 08:04

## 2023-04-05 RX ADMIN — OXYBUTYNIN CHLORIDE 5 MG: 5 TABLET, EXTENDED RELEASE ORAL at 08:04

## 2023-04-05 RX ADMIN — Medication 1 TABLET: at 08:04

## 2023-04-05 RX ADMIN — FERROUS SULFATE TAB 325 MG (65 MG ELEMENTAL FE) 1 EACH: 325 (65 FE) TAB at 08:04

## 2023-04-05 RX ADMIN — ACETAMINOPHEN 650 MG: 325 TABLET ORAL at 11:04

## 2023-04-05 RX ADMIN — LISINOPRIL 20 MG: 10 TABLET ORAL at 08:04

## 2023-04-05 RX ADMIN — Medication 400 MG: at 08:04

## 2023-04-05 RX ADMIN — ATORVASTATIN CALCIUM 80 MG: 40 TABLET, FILM COATED ORAL at 08:04

## 2023-04-05 RX ADMIN — CLOPIDOGREL BISULFATE 75 MG: 75 TABLET, FILM COATED ORAL at 08:04

## 2023-04-05 RX ADMIN — WARFARIN SODIUM 5 MG: 5 TABLET ORAL at 04:04

## 2023-04-05 RX ADMIN — CALCITRIOL CAPSULES 0.25 MCG 0.75 MCG: 0.25 CAPSULE ORAL at 08:04

## 2023-04-05 RX ADMIN — PANTOPRAZOLE SODIUM 40 MG: 40 TABLET, DELAYED RELEASE ORAL at 08:04

## 2023-04-05 RX ADMIN — ASPIRIN 81 MG: 81 TABLET, COATED ORAL at 08:04

## 2023-04-05 RX ADMIN — ALLOPURINOL 100 MG: 100 TABLET ORAL at 08:04

## 2023-04-05 RX ADMIN — FAMOTIDINE 40 MG: 20 TABLET, FILM COATED ORAL at 08:04

## 2023-04-05 NOTE — ASSESSMENT & PLAN NOTE
Body mass index is 37.01 kg/m². Morbid obesity complicates all aspects of disease management from diagnostic modalities to treatment.

## 2023-04-05 NOTE — PLAN OF CARE
[facility] LT[facility] auth still pending.        04/05/23 0855   Post-Acute Status   Post-Acute Authorization Placement   Post-Acute Placement Status Pending payor review/awaiting authorization (if required)

## 2023-04-05 NOTE — PROGRESS NOTES
Wound care assessment of right dorsal foot skin tear which occurred early this morning due to patient rubbing the top of his right foot with the heel of his left foot. Nursing staff applied bilateral quilted heel protectors to aid in protecting the feet from re-occurrence. The right dorsal foot skin tear is a total loss skin tear. Cleaned this area with wound cleanser and applied a cut piece of Urgotul silver mesh to cover the skin tear then covered with a foam dressing. The dressings to bilateral lower legs are noted to be coming up at the edges likely to patient rubbing legs and feet excessively on his bed even during wound care. Bilateral heels remain intact at this time. Applied bilateral foam dressings to heels for an extra layer of protection. Wrapped bilateral lower legs with kerlix and an ace wrap to help keep dressings in place which will promote healing and wound care to bilateral lower legs to be changed twice a week on Mondays and Thursdays. Facial lacerations are healed except 2 areas on the forehead and 1 area on the right side of his nose. Continue with Triad to these areas daily after cleaning with wound cleanser. Did not turn patient to re-assess his sacral area at this time due to patient neck brace not in place and did not want to turn patient without brace in place. Per PCT they are awaiting a new neck brace to apply to him. The patient does have a luis pump attached to his mattress and in proper working status at this time for maximum pressure relief and for moisture control. Wound care to continue to follow this patient throughout his hospital stay.       3/17/23                      3/20/23                     4/4/23       04/04/23 1100        Altered Skin Integrity 04/01/23 0143 Left anterior Leg Skin Tear   Date First Assessed/Time First Assessed: 04/01/23 0143   Altered Skin Integrity Present on Admission - Did Patient arrive to the hospital with altered skin?: yes  Side: Left   Orientation: anterior  Location: (c) Leg  Is this injury device related?: No...   Wound Image    Description of Altered Skin Integrity Partial thickness tissue loss. Shallow open ulcer with a red or pink wound bed, without slough. Intact or Open/Ruptured Serum-filled blister.   Dressing Appearance Moist drainage   Drainage Amount Scant   Drainage Characteristics/Odor Serous   Appearance Pink   Tissue loss description Partial thickness   Care Cleansed with:;Wound cleanser;Applied:;Skin Barrier   Dressing Applied;Silver;Foam;Rolled gauze;Elastic bandage   Dressing Change Due 04/06/23        Altered Skin Integrity 04/01/23 0145 Right anterior Leg Skin Tear   Date First Assessed/Time First Assessed: 04/01/23 0145   Altered Skin Integrity Present on Admission - Did Patient arrive to the hospital with altered skin?: (c) yes  Side: Right  Orientation: anterior  Location: Leg  Primary Wound Type: Skin Tear   Wound Image    Description of Altered Skin Integrity Partial thickness tissue loss. Shallow open ulcer with a red or pink wound bed, without slough. Intact or Open/Ruptured Serum-filled blister.   Dressing Appearance Dried drainage   Drainage Amount Scant   Drainage Characteristics/Odor Serous   Appearance Red   Tissue loss description Partial thickness   Care Cleansed with:;Wound cleanser;Applied:;Skin Barrier   Dressing Applied;Silver;Foam;Rolled gauze;Elastic bandage   Dressing Change Due 04/06/23        Altered Skin Integrity 04/01/23 0145 Right anterior Knee Skin Tear   Date First Assessed/Time First Assessed: 04/01/23 0145   Altered Skin Integrity Present on Admission - Did Patient arrive to the hospital with altered skin?: yes  Side: Right  Orientation: anterior  Location: Knee  Primary Wound Type: Skin Tear   Description of Altered Skin Integrity Partial thickness tissue loss. Shallow open ulcer with a red or pink wound bed, without slough. Intact or Open/Ruptured Serum-filled blister.   Dressing Appearance Open  to air   Drainage Amount None   Appearance Pink   Care Cleansed with:;Wound cleanser;Applied:   Dressing Applied  (Triad)   Dressing Change Due 04/05/23        Altered Skin Integrity 04/01/23 0146 Left Arm Skin Tear   Date First Assessed/Time First Assessed: 04/01/23 0146   Altered Skin Integrity Present on Admission - Did Patient arrive to the hospital with altered skin?: (c) yes  Side: Left  Location: Arm  Primary Wound Type: Skin Tear   Dressing Appearance Open to air   Drainage Amount None   Appearance Red   Care Cleansed with:;Wound cleanser;Applied:;Skin Barrier   Dressing Applied  (Triad)   Dressing Change Due 04/05/23        Altered Skin Integrity 04/01/23 0147 Right Arm Skin Tear   Date First Assessed/Time First Assessed: 04/01/23 0147   Altered Skin Integrity Present on Admission - Did Patient arrive to the hospital with altered skin?: yes  Side: (c) Right  Location: Arm  Primary Wound Type: Skin Tear   Description of Altered Skin Integrity Partial thickness tissue loss. Shallow open ulcer with a red or pink wound bed, without slough. Intact or Open/Ruptured Serum-filled blister.   Dressing Appearance Dry   Drainage Amount Scant   Appearance Dressing in place, unable to visualize   Dressing Change Due 04/06/23        Altered Skin Integrity 04/01/23 0148 Left Upper quadrant Skin Tear   Date First Assessed/Time First Assessed: 04/01/23 0148   Altered Skin Integrity Present on Admission - Did Patient arrive to the hospital with altered skin?: yes  Side: Left  Location: Upper quadrant  Primary Wound Type: Skin Tear   Description of Altered Skin Integrity Partial thickness tissue loss. Shallow open ulcer with a red or pink wound bed, without slough. Intact or Open/Ruptured Serum-filled blister.   Dressing Appearance Dry;Open to air   Drainage Amount None   Care Cleansed with:;Wound cleanser   Dressing Applied  (Triad)   Dressing Change Due 04/05/23        Altered Skin Integrity 04/01/23 0148 Right Shoulder Skin  Tear   Date First Assessed/Time First Assessed: 04/01/23 0148   Altered Skin Integrity Present on Admission - Did Patient arrive to the hospital with altered skin?: yes  Side: Right  Location: Shoulder  Primary Wound Type: Skin Tear   Description of Altered Skin Integrity Partial thickness tissue loss. Shallow open ulcer with a red or pink wound bed, without slough. Intact or Open/Ruptured Serum-filled blister.   Dressing Appearance Open to air;Dry   Appearance Pink   Care Cleansed with:;Wound cleanser   Dressing Applied  (Triad)   Dressing Change Due 04/05/23        Altered Skin Integrity 04/01/23 0154 Right Nose Abrasion(s)   Date First Assessed/Time First Assessed: 04/01/23 0154   Altered Skin Integrity Present on Admission - Did Patient arrive to the hospital with altered skin?: yes  Side: Right  Location: Nose  Primary Wound Type: Abrasion(s)   Description of Altered Skin Integrity Partial thickness tissue loss. Shallow open ulcer with a red or pink wound bed, without slough. Intact or Open/Ruptured Serum-filled blister.   Dressing Appearance Open to air   Drainage Amount None   Tissue loss description Partial thickness   Care Cleansed with:;Wound cleanser   Dressing Applied  (Triad)   Dressing Change Due 04/05/23        Altered Skin Integrity 03/21/23 Right anterior Toe, first Non pressure chronic ulcer Partial thickness tissue loss. Shallow open ulcer with a red or pink wound bed, without slough. Intact or Open/Ruptured Serum-filled blister.   Date First Assessed: 03/21/23   Altered Skin Integrity Present on Admission - Did Patient arrive to the hospital with altered skin?: yes  Side: Right  Orientation: anterior  Location: Toe, first  Is this injury device related?: No  Primary Wound Type:...   Wound Image    Description of Altered Skin Integrity Partial thickness tissue loss. Shallow open ulcer with a red or pink wound bed, without slough. Intact or Open/Ruptured Serum-filled blister.   Dressing Appearance  Moist drainage   Drainage Amount Scant   Drainage Characteristics/Odor Serous   Appearance Red   Care Cleansed with:;Wound cleanser;Applied:   Dressing Applied;Gauze  (Triad)   Dressing Change Due 04/06/23        Altered Skin Integrity 04/04/23 Right anterior;dorsal Foot Skin Tear Partial thickness tissue loss. Shallow open ulcer with a red or pink wound bed, without slough. Intact or Open/Ruptured Serum-filled blister.   Date First Assessed: 04/04/23   Side: Right  Orientation: anterior;dorsal  Location: Foot  Primary Wound Type: Skin Tear  Description of Altered Skin Integrity: Partial thickness tissue loss. Shallow open ulcer with a red or pink wound bed, without sl...   Wound Image     Description of Altered Skin Integrity Full thickness tissue loss. Subcutaneous fat may be visible but bone, tendon or muscle are not exposed   Dressing Appearance Dry   Drainage Amount None   Appearance Red;Pink   Tissue loss description Partial thickness   Wound Length (cm) 3.5 cm   Wound Width (cm) 2.5 cm   Wound Depth (cm) 0.1 cm   Wound Volume (cm^3) 0.875 cm^3   Wound Surface Area (cm^2) 8.75 cm^2   Care Cleansed with:;Wound cleanser;Applied:;Skin Barrier   Dressing Applied;Silver;Foam   Dressing Change Due 04/06/23

## 2023-04-05 NOTE — PLAN OF CARE
Received call from Lizzeth with [facility]- LTAC request is being reviewed. Explained to Lizzeth the situation with [facility] and told her we have other SNF denials stating pt is too high level of care. She is going to call me back after review    Received call from [facility] @ [facility] who stated they never said the pt couldn't return and that if LTAC is denied they accept the pt as SNF.    CM following. Plan to DC today to [facility] or [facility]       04/05/23 3425   Post-Acute Status   Post-Acute Authorization Placement   Post-Acute Placement Status Pending payor review/awaiting authorization (if required)

## 2023-04-05 NOTE — SUBJECTIVE & OBJECTIVE
"Interval History: see "Hospital Course"    Review of Systems   Skin:  Positive for color change and wound.   Allergic/Immunologic: Positive for immunocompromised state.   Objective:     Vital Signs (Most Recent):  Temp: 96.1 °F (35.6 °C) (04/05/23 1422)  Pulse: 62 (04/05/23 1422)  Resp: 16 (04/05/23 1422)  BP: 117/60 (04/05/23 1422)  SpO2: (!) 94 % (04/05/23 1422)   Vital Signs (24h Range):  Temp:  [96.1 °F (35.6 °C)-98 °F (36.7 °C)] 96.1 °F (35.6 °C)  Pulse:  [62-90] 62  Resp:  [16-20] 16  SpO2:  [93 %-100 %] 94 %  BP: (101-182)/(57-98) 117/60     Weight: 104 kg (229 lb 4.5 oz)  Body mass index is 37.01 kg/m².    Intake/Output Summary (Last 24 hours) at 4/5/2023 1559  Last data filed at 4/5/2023 1303  Gross per 24 hour   Intake 1287 ml   Output 350 ml   Net 937 ml      Physical Exam  Vitals and nursing note reviewed.   Constitutional:       General: He is not in acute distress.  HENT:      Head: Normocephalic.      Right Ear: External ear normal.      Left Ear: External ear normal.      Nose: Nose normal.      Mouth/Throat:      Mouth: Mucous membranes are moist.      Pharynx: Oropharynx is clear.   Eyes:      Extraocular Movements: Extraocular movements intact.      Conjunctiva/sclera: Conjunctivae normal.   Cardiovascular:      Rate and Rhythm: Normal rate and regular rhythm.      Pulses: Normal pulses.      Heart sounds: Normal heart sounds.   Pulmonary:      Effort: Pulmonary effort is normal.      Breath sounds: Normal breath sounds.   Abdominal:      General: Bowel sounds are normal.      Palpations: Abdomen is soft.   Musculoskeletal:         General: Normal range of motion.      Cervical back: Normal range of motion and neck supple.      Right lower leg: No edema.      Left lower leg: No edema.   Skin:     Findings: Bruising present.   Neurological:      Mental Status: He is alert and oriented to person, place, and time. Mental status is at baseline.   Psychiatric:         Mood and Affect: Mood normal.    "      Behavior: Behavior normal.       Significant Labs: All pertinent labs within the past 24 hours have been reviewed.    Significant Imaging: I have reviewed all pertinent imaging results/findings within the past 24 hours.

## 2023-04-05 NOTE — PT/OT/SLP PROGRESS
Occupational Therapy   Treatment    Name: Richard Bustos  MRN: [id number]  Admitting Diagnosis:  Altered mental status       Recommendations:     Discharge Recommendations: nursing facility, skilled  Discharge Equipment Recommendations:  none, other (see comments) (TBD next level of care)  Barriers to discharge:  None    Assessment:     Richard Bustos is a 77 y.o. male with a medical diagnosis of Altered mental status.  He presents with the following performance deficits affecting function are weakness, impaired endurance, impaired self care skills, impaired functional mobility, impaired balance, impaired cognition, decreased upper extremity function, decreased lower extremity function, decreased safety awareness, abnormal tone, decreased ROM, impaired coordination, impaired fine motor, impaired skin, edema, impaired cardiopulmonary response to activity, orthopedic precautions.     Rehab Prognosis:  Good; patient would benefit from acute skilled OT services to address these deficits and reach maximum level of function.       Plan:     Patient to be seen 5 x/week to address the above listed problems via self-care/home management, therapeutic activities, therapeutic exercises  Plan of Care Expires: 04/29/23  Plan of Care Reviewed with: patient    Subjective     Chief Complaint: Wants to try to have BM  Patient/Family Comments/goals: To get better  Pain/Comfort:  Pain Rating 1: 0/10    Objective:   Increased edema noted to distal L UE, especially of L hand - since previous session - nurse aware    Communicated with: Nurse prior to session.  Patient found HOB elevated with peripheral IV, bed alarm, telemetry upon OT entry to room.    General Precautions: Standard, aspiration, fall    Orthopedic Precautions:spinal precautions  Braces: Aspen collar    Occupational Performance:     Bed Mobility:    Patient completed Rolling/Turning to Right with maximal assistance  Patient completed Scooting/Bridging with maximal assistance and  (A) x 2 people with draw sheet      Activities of Daily Living:  Feeding:  Overall Max (A) to drink thickened water from styrofoam cup with lid - patient demonstrates decreased  and digit strength as well as impaired fine motor coordination inhibiting patient from retrieving and sipping from cup independently. OTR applying non-slip to cup with lid/straw and instructing on achieving full digit extension (including thumb) in order to achieve effective grasp of cup - patient demonstrates ability to sip from cup independently with assistance to retrieve/replace cup to/from bedside table; patient performing 5+ times throughout session     [facility] 6 Click ADL: 12    Treatment & Education:  - OTR reinforcing education/instruction regarding OT role/POC, safety awareness/fall prevention and harm prevention as well as spinal precautions. OTR reinforcing log roll technique during bed mobility to position patient onto bed pan; OTR also instructing on importance of frequent repositioning to prevent further skin injury/breakdown; OTR instructing on importance of preventing dry skin and risks associated with increased dry skin (injury, abrasions, etc) as B UE skin noted to be dry/flaking - OTR applying lotion to B UE excluding palm of R hand to facilitate patient's independence with self-feeding/drinking   - With instruction and subsequent verbal cues for technique, patient performing R UE AROM flexion/extension all joints and open/close of hand x 5 reps  - Noted increased edema of distal L UE especially hand; AAROM L UE: shoulder flexion/extension (< or = 90*), functional elbow flexion/extension for palm to/from thigh/chin, forearm supination/pronation, wrist flexion/extension, and open/close of hand 2 sets x 5 reps; OTR instructing on importance of AAROM or PROM in this sequence as part of edema management and prevention of further edema to R hand as well as importance of positioning of L UE in elevation as part of edema  management as well - patient verbalizes/demonstrates understanding and in agreement when appropriate. Patient will benefit from daily reinforcement of education/instruction provided.  - Per patient request to have BM, patient instructed on log rolling technique during bed mobility to be position on bed pan with assist of second staff member  - OTR ensuring call button in obtainable position on patient's lap and reinforcing correct button to push to call for nurse when finished on bed pan (or to express other needs/wants) - patient demonstrates successful push of nurse call button x 4 prior to OTR exit; however, OTR repositioning call button to increase success with tasks x 2 as patient demonstrating inability to locate and successfully push button x 2; patient also demonstrates poor recall as once OTR exiting room (leaving door to patient room open), patient signaling for OTR to return stating that he could not locate call button (still positioned on lap)    Patient left HOB elevated with all lines intact, call button in reach, bed alarm on, and Nurse notified    GOALS:   Multidisciplinary Problems       Occupational Therapy Goals          Problem: Occupational Therapy    Goal Priority Disciplines Outcome Interventions   Occupational Therapy Goal     OT, PT/OT Ongoing, Progressing    Description: Goals to be met by: 4/29/23     Patient will increase functional independence with ADLs by performing:    Feeding with Minimal Assistance.  UE Dressing with Moderate Assistance.  Grooming while seated with Minimal Assistance.  Increased functional strength to WFL for participation in ADL's/IADL's.                         Time Tracking:     OT Date of Treatment: 04/05/23  OT Start Time: 1328  OT Stop Time: 1358  OT Total Time (min): 30 min    Billable Minutes:Self Care/Home Management 10  Therapeutic Exercise 20    OT/MARZENA: OT     Number of MARZENA visits since last OT visit: 0    4/5/2023

## 2023-04-05 NOTE — PLAN OF CARE
Problem: Physical Therapy  Goal: Physical Therapy Goal  Description: Goals to be met by: 4/15/2023     Patient will increase functional independence with mobility by performin). Supine to sit with MInimal Assistance  2). Sit to supine with MInimal Assistance  3). Bed to chair transfer with Moderate Assistance   4). Sitting at edge of bed x> 10 minutes with Contact Guard Assistance    Outcome: Ongoing, Progressing   Therapeutic activity : bed mobility, transfers EOB, LE exercises, positioning .

## 2023-04-05 NOTE — ASSESSMENT & PLAN NOTE
Pt experienced episode of altered mental status with decreased LOC after receiving 2 pain pills and undergoing physical therapy  Suspect this is etiology of episode   Patient back at baseline currently   Will obtain EEG and consult Neurology   Patient can not go under MRI due to recent cervical instrumentation   CT head reviewed and negative for acute findings    4/1 - patient's mentation stable today.  Suspect episode yesterday was due to narcotics administered.  Will hold all opiates and continue to monitor overnight and if patient remains stable plan to discharge back to [place] tomorrow.    4/2 -pts mentation remains much improved and stable today. Will cont to monitor closely and plan to d/c back to [facility] tomorrow.     4/4 - pt has int confusion and sundowning, suspect hospital delirium due to prolonged stay. Pt calm and cooperative. Reorients to situation well and is oriented x 3. Will cont to monitor closely.

## 2023-04-05 NOTE — PLAN OF CARE
Plan of care reviewed with pt. Pt verbalized understanding. Patient is alert and oriented to self only. A-febrile throughout the shift. Meds given per MAR. BG monitored closely, no coverage needed. Repositioned with weight shifting assistance. No complaints of pain or discomfort. Purposeful hourly/q2hr rounding done during shift to promote patient safety. NAD noted. Safety maintained with side rails up x3, bed wheels locked, bed in lowest position, call light in reach. Patient educated to call for assistance with ambulation if needed, verbalized understanding. Pt remains free of falls. No further needs expressed at this time. Will continue to monitor.

## 2023-04-05 NOTE — PLAN OF CARE
Spoke to pt's daughter Citlali over the phone. Pt came here from [facility]. Prior to that he was home with Citlali. Citlali is unable to take care of him any longer. Explained to Citlali we have multiple SNF denials and LT[facility] auth is pending [facility]. If N approves LTAC, pt will DC today. Citlali said she talked to Dr Solomon at [facility] who said if LTAC is denied, they will possibly take the pt back SNF. However when CM called [facility] yesterday, we were told they definitely cannot take the pt back. Asked Citlali if she would be able to take the pt home with home health- she said there is no way. Asked Citlali if she is opposed to [facility]- she said she isnt opposed but goal is SNF/ltac first. We anticipate an answer from [facility] regarding LTAC placement today, if denied will reach out to [facility], if they deny- will start penitentiary NH placement. CM following

## 2023-04-05 NOTE — PLAN OF CARE
Problem: SLP  Goal: SLP Goal  Description: 1. Pt will undergo MBSS to objectively assess swallow physiology and determine presence/severity of dysphagia.    Outcome: Ongoing, Progressing    Clinical swallowing evaluation completed. Pt presents with s/s pharyngeal dysphagia. REC MBSS for further evaluation. In there interim, dental sot textures (IDDSI 6) with NECTAR thick liquids.

## 2023-04-05 NOTE — ASSESSMENT & PLAN NOTE
Patient's FSGs are controlled on current medication regimen.  Last A1c reviewed-   Lab Results   Component Value Date    LABA1C 6.6 (H) 08/08/2016    HGBA1C 5.1 01/06/2022     Most recent fingerstick glucose reviewed-   Recent Labs   Lab 04/04/23  1635 04/04/23  2110 04/05/23  0814 04/05/23  1053   POCTGLUCOSE 106 106 89 136*     Current correctional scale  Low  Maintain anti-hyperglycemic dose as follows-   Antihyperglycemics (From admission, onward)    Start     Stop Route Frequency Ordered    03/31/23 1814  insulin aspart U-100 pen 0-5 Units         -- SubQ Before meals & nightly PRN 03/31/23 1814        Hold Oral hypoglycemics while patient is in the hospital.

## 2023-04-05 NOTE — PLAN OF CARE
Care plan and meds reviewed. Pt verbalized understanding. VSS throughout shift. Pt on room air. x1 assist to Bed pan. PRN pain meds administered. Barium swallow test in the morning. Repositions self. Safety maintained. Call light within reach. No complaints at this time.

## 2023-04-05 NOTE — NURSING
Wound care follow up to unstageable wound located on sacrum. Cleansed area with soap and water and patted skin dry, applied skin prep to all surrounding tissue. Applied Aquacel ag advantage to wound bed and secured with foam dressing.     Patient was positioned on his R side and repositioned to L side at the end of encounter.

## 2023-04-05 NOTE — PT/OT/SLP PROGRESS
Physical Therapy Treatment    Patient Name:  Richard Bustos   MRN:  [id number]    Recommendations:     Discharge Recommendations: nursing facility, skilled, LTACH (long-term acute care hospital)  Discharge Equipment Recommendations: none  Barriers to discharge: None    Assessment:     Richard Bustos is a 77 y.o. male admitted with a medical diagnosis of Altered mental status.  He presents with the following impairments/functional limitations: weakness, impaired endurance, impaired sensation, impaired self care skills, impaired functional mobility, impaired balance, decreased upper extremity function, decreased lower extremity function, pain, impaired skin . Supine in bed with heel protector boots in place and posterior portion of collar. Collar secured in place.  Transferred EOB wth Max A x 2 people with Min A to maintain upright . Difficulty using BUE's for support.  Nurse Dixon arrived to change dressing posterior neck while seated. Performed LE exercises seated with assistance.     Rehab Prognosis: Fair; patient would benefit from acute skilled PT services to address these deficits and reach maximum level of function.    Recent Surgery: * No surgery found *      Plan:     During this hospitalization, patient to be seen 6 x/week to address the identified rehab impairments via therapeutic activities, therapeutic exercises, neuromuscular re-education and progress toward the following goals:    Plan of Care Expires:  04/15/23    Subjective     Chief Complaint: pain posterior neck / collar uncomfortable.   Patient/Family Comments/goals: none stated  Pain/Comfort:  Pain Rating 1: other (see comments) (did not rate)  Location - Orientation 1: posterior  Location 1: neck (and shoulders)  Pain Addressed 1: Reposition, Nurse notified      Objective:     Communicated with nurse Snell with [facility] prior to session.  Patient found supine with   upon PT entry to room.     General Precautions: Standard, aspiration, fall  Orthopedic  Precautions: spinal precautions  Braces: Aspen collar  Respiratory Status: Room air     Functional Mobility:  Bed Mobility:     Rolling Left:  maximal assistance  Rolling Right: maximal assistance  Supine to Sit: maximal assistance and of 2 persons  Sit to Supine: maximal assistance and of 2 persons      AM-PAC 6 CLICK MOBILITY          Treatment & Education:  Collar applied.  Transferred EOB with A x 2.   LE exercises in sitting with Min A for sitting baklance.  Sit > supine with A x 2.  Rolled R<>L for positioning with Max A.  Total A to scoot HOB.     Patient left HOB elevated with all lines intact, call button in reach, bed alarm on, nurse Channing  notified, and daughter present..    GOALS:   Multidisciplinary Problems       Physical Therapy Goals          Problem: Physical Therapy    Goal Priority Disciplines Outcome Goal Variances Interventions   Physical Therapy Goal     PT, PT/OT Ongoing, Progressing     Description: Goals to be met by: 4/15/2023     Patient will increase functional independence with mobility by performin). Supine to sit with MInimal Assistance  2). Sit to supine with MInimal Assistance  3). Bed to chair transfer with Moderate Assistance   4). Sitting at edge of bed x> 10 minutes with Contact Guard Assistance                         Time Tracking:     PT Received On: 23  PT Start Time: 1010     PT Stop Time: 1033  PT Total Time (min): 23 min     Billable Minutes: Therapeutic Activity 23min    Treatment Type: Treatment  PT/PTA: PTA     Number of PTA visits since last PT visit: 2023

## 2023-04-05 NOTE — PT/OT/SLP EVAL
"Speech Language Pathology Evaluation  Bedside Swallow    Patient Name:  Richard Bustos   MRN:  [id number]  Admitting Diagnosis: Altered mental status    Recommendations:                 General Recommendations:  Modified barium swallow study  Diet recommendations:  Dental Soft, Soft & Bite Sized Diet - IDDSI Level 6, Mildly thick/Nectar thick liquids - IDDSI Level 2   Aspiration Precautions:  HOB elevated to at least 50° during all PO intake (including medications), Assistance with meals and Assistance with thickening liquids, Meds whole 1 at a time, and Small bites/sips   General Precautions: Standard, aspiration, fall, nectar thick  Communication strategies:  none    History:   Per HPI: Richard Bustos is a 77 yr old male with PMHx significant for  PMHx of HTN, CAD s/p cardiac stents, lumbar DDD with chronic bilat LE weakness, HLD, gout, MI, CHF, arthritis, A-fib, on Coumadin, DM II, dementia, and MTHFR mutation who presents to the ED via EMS from [facility].  History obtained by ED physician due to pts underlying dementia. Per ED staff daughter reports after arriving to the longterm earlier today, she noticed the patient was "starring off into space" ~20 minutes after a PT session.  EMS states upon their arrival on scene, staff mentioned the patient's respiratory rate dropped to 6 with an associated systolic pressure of 70 mmHg.  EMS reported the nursing home personnel described an episode "resembling a seizure without jerking" prior to EMS arrival.  Pt had  normal CBG en route to the ED.  Upon arrival to ED pt was awake and alert and responding appropriately and bp was stable. Per chart review pt experienced a fall on 03/17/2023 which resulted in cervical fracture with cord edema and pt underwent cervical spine fusion performed on 03/24/2023 by Dr. Kc. Pt was d/c to skilled Nursing Facility 3 days ago.  Patient recalls working with PT this morning.  He reports he received 2 pain pills prior to his PT session " and this caused him extreme fatigue and he believes this is the cause of his episode today.  Patient denies any acute complaints at this time.  He reports chronic weakness that is the same as prior to his surgery.  Patient is pleasantly confused but oriented to name, date of birth and year. Family not present at BS. Pt reports he feels back as baseline. Pt will be admitted to hospital medicine services for further workup and management.     Past Medical History:   Diagnosis Date    Anemia     Anemia of other chronic disease 09/13/2017    Anemia, chronic renal failure 09/13/2017    Anemia, unspecified 09/13/2017    Anticoagulant long-term use     Aorta aneurysm     Arthritis     Atrial fibrillation     Bacteremia due to Streptococcus 01/08/2022    CAD (coronary artery disease)     CHF (congestive heart failure)     Chronic kidney disease     Clotting disorder 09/13/2017    Colon polyp     Dementia     Diabetes mellitus     not on meds    Diabetes mellitus type II     Diverticulosis     Elevated PSA     Encounter for blood transfusion     Former smoker     General anesthetics causing adverse effect in therapeutic use     TROUBLE COMING OUT OF ANESTHESIA WITH GASTRIC BYPASS    GERD (gastroesophageal reflux disease)     Gout     Hx of colonic polyps     Hypercoagulable state     Hyperlipidemia     Hypertension     MI, old 2010    MTHFR mutation     Myocardial infarction     9/2010    Osteomyelitis of ankle or foot 03/09/2017    Prostate cancer 06/2016    Sleep apnea     Squamous cell carcinoma 01/23/2018    Left helix, imiquimod    Venous stasis     Chronic       Past Surgical History:   Procedure Laterality Date    ABDOMINAL SURGERY      CARDIAC SURGERY      3 STENTS     CAUDAL EPIDURAL STEROID INJECTION N/A 6/22/2020    Procedure: INJECTION, STEROID, SPINE, EPIDURAL, CAUDAL;  Surgeon: Evangelist Bose MD;  Location: [facility] OR;  Service: Pain Management;  Laterality: N/A;    COLONOSCOPY  02/2011    diverticulosis with  diverticula with clot, no active bleeding    COLONOSCOPY N/A 8/24/2016    Procedure: COLONOSCOPY;  Surgeon: Saroj Borjas MD;  Location: [facility] ENDO;  Service: Endoscopy;  Laterality: N/A;    COLONOSCOPY N/A 11/18/2020    Procedure: COLONOSCOPY;  Surgeon: Saroj Borjas MD;  Location: [facility] ENDO;  Service: Endoscopy;  Laterality: N/A;    COLONOSCOPY N/A 10/27/2021    Procedure: COLONOSCOPY;  Surgeon: Saroj Borjas MD;  Location: [facility] ENDO;  Service: Endoscopy;  Laterality: N/A;    EPIDURAL STEROID INJECTION INTO LUMBAR SPINE N/A 6/22/2020    Procedure: Injection-steroid-epidural-lumbar;  Surgeon: Evangelist Bose MD;  Location: [facility] OR;  Service: Pain Management;  Laterality: N/A;  L3 L4 L5 S1    EPIDURAL STEROID INJECTION INTO LUMBAR SPINE N/A 9/21/2020    Procedure: Injection-steroid-epidural-lumbar;  Surgeon: Evangelist Bose MD;  Location: [facility] OR;  Service: Pain Management;  Laterality: N/A;  L5-S1    FUSION, SPINE, CERVICAL N/A 3/24/2023    Procedure: FUSION, SPINE, CERVICAL;  Surgeon: Kike Kc DO;  Location: [facility] OR;  Service: Neurosurgery;  Laterality: N/A;  posterior cervical decompression/fusion C3-T1    GASTRIC BYPASS  2/5/2008    IVC FILTER RETRIEVAL      JOINT REPLACEMENT  1996 and 2001    bi-lat hip replacement/Rt Hip and Lt Hip    RADIOFREQUENCY ABLATION OF LUMBAR MEDIAL BRANCH NERVE AT SINGLE LEVEL Bilateral 1/10/2020    Procedure: Radiofrequency Ablation, Nerve, Spinal, Lumbar, Medial Branch, 1 Level;  Surgeon: Evangelist Bose MD;  Location: [facility] OR;  Service: Pain Management;  Laterality: Bilateral;  L3,L4,L5    RADIOFREQUENCY ABLATION OF LUMBAR MEDIAL BRANCH NERVE AT SINGLE LEVEL Bilateral 11/23/2021    Procedure: Radiofrequency Ablation, Nerve, Spinal, Lumbar, Medial Branch, Bilateral L 3,4,5;  Surgeon: Nile Causey MD;  Location: [facility] OR;  Service: Pain Management;  Laterality: Bilateral;    ROTATOR CUFF REPAIR      Rt shoulder    Stents  8/18/2010    x 3    UPPER  "GASTROINTESTINAL ENDOSCOPY  02/2011       Social History: Pt living with daughter prior to recent fall. Transferred to [facility] from [facility].    Prior Intubation HX:  s/p cervical spine fusion performed on 03/24/2023 by Dr. Kc\    Modified Barium Swallow: None in Epic    Imaging:  CT Head Without Contrast   Final Result      1. There is no acute intracranial abnormality or definite change compared to the prior study.  There is volume loss, nonspecific white matter change with remote lacunar infarction in the right cerebellum but there is no intracranial hemorrhage, mass effect or obvious acute infarction.  It should be noted that MRI is more sensitive in the detection of subtle or acute nonhemorrhagic ischemic disease.   2. There is a large frontal scalp hematoma without underlying fracture.   3. There is no change in the nasal bone fractures   4. There is subcutaneous emphysema in addition to posterior skin staples related to recent cervical spine surgery.         Electronically signed by: Emanuel Dinh MD   Date:    03/31/2023   Time:    12:42      X-Ray Chest AP Portable   Final Result      No acute cardiopulmonary disease         Electronically signed by: Bernie Lyon MD   Date:    03/31/2023   Time:    12:49      Fl Modified Barium Swallow Speech    (Results Pending)        Prior diet: "Soft" with thin liquids.    Occupation/hobbies/homemaking: Prior to recent fall and cervical fusion pt was ambulatory with cane and driving. Independent with ADLS with the exception of bathing.    Subjective     Daughter shares, "He chokes on almost everything that he swallows."     Pain/Comfort:  Pain Rating 1: 0/10    Respiratory Status: Room air    Objective:   Pt seen for clinical swallow evaluation. He is AAOx4 and cooperative. Daughters at bedside and assist with history. Pt and daughter endorse dysphagia and dysphonia (hoarse vocal quality) since cervical fusion 3/27/23. No prior history of " dysphagia.    Oral Musculature Evaluation  Oral Musculature: left weakness (mild)  Dentition: scattered dentition, teeth in poor condition  Secretion Management: adequate  Mucosal Quality: adequate  Mandibular Strength and Mobility: WFL  Oral Labial Strength and Mobility: WFL (reduced retraction on L)  Lingual Strength and Mobility: impaired strength (lingual deviation upon protrusion)  Velar Elevation: WFL  Volitional Cough: fair  Volitional Swallow: able to palpate laryngeal rise  Voice Prior to PO Intake: hoarse  Bruising noted to almost entire face. Large, frontal scalp hematoma     Bedside Swallow Eval:   Consistencies Assessed:  Thin liquids --via tsp, cup and straw  Nectar thick liquids --via tsp, cup and straw  Puree --applesauce  Mixed consistencies --diced peaches      Oral Phase:   WFL    Pharyngeal Phase:   coughing/choking noted with thin trials via cup and straw    Compensatory Strategies  Small, regulated sips    Treatment: Pt/family educated re: results/recs of evaluation, MBSS, diet recs, how to use thickener, SLP role and POC. Receptive to information provided.     Assessment:     Richard Bustos is a 77 y.o. male with an SLP diagnosis of Dysphagia and Dysphonia following cervical fusion 3/27/23.  He presents with s/s possible pharyngeal dysphagia. REC MBSS for further evaluation. In there interim, dental sot textures (IDDSI 6) with NECTAR thick liquids. Orders received for MBSS, however, unable to complete today 2° radiology staffing. Will complete in AM. Discussed with RN/MD via secure chat.     Goals:   Multidisciplinary Problems       SLP Goals          Problem: SLP    Goal Priority Disciplines Outcome   SLP Goal     SLP Ongoing, Progressing   Description: 1. Pt will undergo MBSS to objectively assess swallow physiology and determine presence/severity of dysphagia.                         Plan:     Patient to be seen:   (pending results of MBSS)   Plan of Care expires:     Plan of Care reviewed  with:      SLP Follow-Up:  Yes       Discharge recommendations:  nursing facility, skilled   Barriers to Discharge:  Level of Skilled Assistance Needed .    Time Tracking:     SLP Treatment Date:   04/05/23  Speech Start Time:  0936  Speech Stop Time:  1003     Speech Total Time (min):  27 min    Billable Minutes: Treatment Swallowing Dysfunction 8, Eval Swallow and Oral Function 19, and Total Time 27    04/05/2023

## 2023-04-05 NOTE — PROGRESS NOTES
"[facility] Medicine  Progress Note    Patient Name: Richard Bustos  MRN: [id number]  Patient Class: OP- Observation   Admission Date: 3/31/2023  Length of Stay: 0 days  Attending Physician: Bill Gamino MD  Primary Care Provider: Familia Ramirez Jr, MD        Subjective:     Principal Problem:Altered mental status        HPI:  Richard Bustos is a 77 yr old male with PMHx significant for  PMHx of HTN, CAD s/p cardiac stents, lumbar DDD with chronic bilat LE weakness, HLD, gout, MI, CHF, arthritis, A-fib, on Coumadin, DM II, dementia, and MTHFR mutation who presents to the ED via EMS from [facility].  History obtained by ED physician due to pts underlying dementia. Per ED staff daughter reports after arriving to the [facility] earlier today, she noticed the patient was "starring off into space" ~20 minutes after a PT session.  EMS states upon their arrival on scene, staff mentioned the patient's respiratory rate dropped to 6 with an associated systolic pressure of 70 mmHg.  EMS reported the nursing home personnel described an episode "resembling a seizure without jerking" prior to EMS arrival.  Pt had  normal CBG en route to the ED.  Upon arrival to ED pt was awake and alert and responding appropriately and bp was stable. Per chart review pt experienced a fall on 03/17/2023 which resulted in cervical fracture with cord edema and pt underwent cervical spine fusion performed on 03/24/2023 by Dr. Kc. Pt was d/c to skilled Nursing Facility 3 days ago.  Patient recalls working with PT this morning.  He reports he received 2 pain pills prior to his PT session and this caused him extreme fatigue and he believes this is the cause of his episode today.  Patient denies any acute complaints at this time.  He reports chronic weakness that is the same as prior to his surgery.  Patient is pleasantly confused but oriented to name, date of birth and year. Family not present at BS. Pt reports he " "feels back as baseline. Pt will be admitted to hospital medicine services for further workup and management.      Overview/Hospital Course:  Richard Bustos is a 77 year old male with a past medical history of HTN, CAD s/p PCI, HLD, CHF, Afib, DM, obesity, MTHFR mutation, recent cervical neck fracture s/p repair, dementia, gout, and OA who presented with altered mental status from [facility] thought to be secondary to opiate overuse. His mental status improved during his course and he was able to work with PT/OT. He is pending discharge back to SNF or LTAC.      Interval History: see "Hospital Course"    Review of Systems   Skin:  Positive for color change and wound.   Allergic/Immunologic: Positive for immunocompromised state.   Objective:     Vital Signs (Most Recent):  Temp: 96.1 °F (35.6 °C) (04/05/23 1422)  Pulse: 62 (04/05/23 1422)  Resp: 16 (04/05/23 1422)  BP: 117/60 (04/05/23 1422)  SpO2: (!) 94 % (04/05/23 1422)   Vital Signs (24h Range):  Temp:  [96.1 °F (35.6 °C)-98 °F (36.7 °C)] 96.1 °F (35.6 °C)  Pulse:  [62-90] 62  Resp:  [16-20] 16  SpO2:  [93 %-100 %] 94 %  BP: (101-182)/(57-98) 117/60     Weight: 104 kg (229 lb 4.5 oz)  Body mass index is 37.01 kg/m².    Intake/Output Summary (Last 24 hours) at 4/5/2023 1559  Last data filed at 4/5/2023 1303  Gross per 24 hour   Intake 1287 ml   Output 350 ml   Net 937 ml      Physical Exam  Vitals and nursing note reviewed.   Constitutional:       General: He is not in acute distress.  HENT:      Head: Normocephalic.      Right Ear: External ear normal.      Left Ear: External ear normal.      Nose: Nose normal.      Mouth/Throat:      Mouth: Mucous membranes are moist.      Pharynx: Oropharynx is clear.   Eyes:      Extraocular Movements: Extraocular movements intact.      Conjunctiva/sclera: Conjunctivae normal.   Cardiovascular:      Rate and Rhythm: Normal rate and regular rhythm.      Pulses: Normal pulses.      Heart sounds: Normal heart sounds.   Pulmonary: "      Effort: Pulmonary effort is normal.      Breath sounds: Normal breath sounds.   Abdominal:      General: Bowel sounds are normal.      Palpations: Abdomen is soft.   Musculoskeletal:         General: Normal range of motion.      Cervical back: Normal range of motion and neck supple.      Right lower leg: No edema.      Left lower leg: No edema.   Skin:     Findings: Bruising present.   Neurological:      Mental Status: He is alert and oriented to person, place, and time. Mental status is at baseline.   Psychiatric:         Mood and Affect: Mood normal.         Behavior: Behavior normal.       Significant Labs: All pertinent labs within the past 24 hours have been reviewed.    Significant Imaging: I have reviewed all pertinent imaging results/findings within the past 24 hours.      Assessment/Plan:      Anemia  Stable.   -Trend Hgb with CBC    Closed nondisplaced fracture of sixth cervical vertebra  S/p cervical fusion 3/24.      Dementia without behavioral disturbance  Chronic. Stable.      OAB (overactive bladder)  -Oxybutynin      Chronic systolic congestive heart failure  -Coreg  -Lisinopril  -Telemetry      Generalized weakness  -PT/OT  -Fall precautions      Paroxysmal atrial fibrillation  -Telemetry  -Coumadin  -Amiodarone  -Coreg      Obesity (BMI 30-39.9)  Body mass index is 37.01 kg/m². Morbid obesity complicates all aspects of disease management from diagnostic modalities to treatment.       GERD (gastroesophageal reflux disease)  -PPI      Gout  -Continue home allopurinol      Hyperlipidemia associated with type 2 diabetes mellitus  -Continue statin, Plavix and ASA    Hypertension associated with diabetes  -Continue Coreg and lisinopril  -Continue to monitor    MTHFR mutation (methylenetetrahydrofolate reductase)  -Coumadin  -INR daily      Diabetes mellitus with chronic kidney disease, stage III  Patient's FSGs are controlled on current medication regimen.  Last A1c reviewed-   Lab Results    Component Value Date    LABA1C 6.6 (H) 08/08/2016    HGBA1C 5.1 01/06/2022     Most recent fingerstick glucose reviewed-   Recent Labs   Lab 04/04/23  1635 04/04/23  2110 04/05/23  0814 04/05/23  1053   POCTGLUCOSE 106 106 89 136*     Current correctional scale  Low  Maintain anti-hyperglycemic dose as follows-   Antihyperglycemics (From admission, onward)    Start     Stop Route Frequency Ordered    03/31/23 1814  insulin aspart U-100 pen 0-5 Units         -- SubQ Before meals & nightly PRN 03/31/23 1814        Hold Oral hypoglycemics while patient is in the hospital.      VTE Risk Mitigation (From admission, onward)         Ordered     warfarin (COUMADIN) tablet 5 mg  Daily         04/03/23 1615     IP VTE HIGH RISK PATIENT  Once         03/31/23 2030     Place sequential compression device  Until discontinued         03/31/23 2030                Discharge Planning   ORACIO: 4/6/2023     Code Status: Full Code   Is the patient medically ready for discharge?:     Reason for patient still in hospital (select all that apply): Imaging, Consult recommendations and Pending disposition  Discharge Plan A: Skilled Nursing Facility                  Bill Gamino MD  Department of Hospital Medicine   [facility]

## 2023-04-05 NOTE — CARE UPDATE
04/04/23 1934   Patient Assessment/Suction   Level of Consciousness (AVPU) alert   PRE-TX-O2   Device (Oxygen Therapy) room air   SpO2 (!) 93 %   Pulse Oximetry Type Intermittent   Pulse 62   Resp 20

## 2023-04-05 NOTE — PROGRESS NOTES
Wound Care Progress Note    Subjective:       Patient ID: Richard Bustos is a 77 y.o. male.    Chief Complaint: Altered Mental Status (Brief peroid of altered mental status  / awake , answers questions on arrival to ED)    HPI  Pt seen at bedside for a FU visit for multiple skin tears and an unstagable pressure ulcer of the sacrum. Pt is doing much better today, no new complaints today.  Review of Systems   Skin:  Positive for wound.        Multiple sku=in tears to BLE and BUE. Unstagable pressure ulcer of the sacrum       Objective:        Physical Exam  Vitals and nursing note reviewed. Exam conducted with a chaperone present.   Constitutional:       General: He is not in acute distress.     Appearance: Normal appearance. He is not ill-appearing, toxic-appearing or diaphoretic.   Skin:     General: Skin is warm and dry.      Coloration: Skin is not jaundiced or pale.      Findings: Lesion present. No bruising, erythema or rash.      Comments: Unstagable pressure ulcer of the sacrum, multiple skin tears of the BLE and BUE, all POA   Neurological:      General: No focal deficit present.      Mental Status: He is alert and oriented to person, place, and time.   Psychiatric:         Mood and Affect: Mood normal.         Behavior: Behavior normal.         Thought Content: Thought content normal.         Judgment: Judgment normal.       Vitals:    04/05/23 1112   BP: (!) 103/57   Pulse:    Resp:    Temp:        Assessment:           ICD-10-CM ICD-9-CM   1. Altered mental status, unspecified altered mental status type  R41.82 780.97   2. Dehydration  E86.0 276.51   3. Chest pain  R07.9 786.50   4. LV dysfunction, EF 40%  I51.9 429.9   5. Chronic systolic congestive heart failure  I50.22 428.22     428.0   6. Essential hypertension  I10 401.9   7. Stage 3b chronic kidney disease  N18.32 585.3   8. Paroxysmal atrial fibrillation, new onset, 11/2014  I48.0 427.31   9. MTHFR mutation (methylenetetrahydrofolate  [facility])  Z15.89 V84.89   10. Multiple skin tears  T14.8XXA 879.8   11. Ulcer of toe, left, with fat layer exposed  L97.522 707.15            Altered Skin Integrity 04/01/23 0141 Coccyx (Active)   04/01/23 0141   Altered Skin Integrity Present on Admission - Did Patient arrive to the hospital with altered skin?: yes   Side:    Orientation:    Location: Coccyx   Wound Number:    Is this injury device related?:    Primary Wound Type:    Description of Altered Skin Integrity:    Ankle-Brachial Index:    Pulses:    Removal Indication and Assessment:    Wound Outcome:    (Retired) Wound Length (cm):    (Retired) Wound Width (cm):    (Retired) Depth (cm):    Wound Description (Comments):    Removal Indications:    Dressing Appearance Clean;Dry;Intact 04/04/23 1930   Drainage Amount None 04/04/23 1930   Appearance Dressing in place, unable to visualize 04/04/23 1930   Wound Edges Open 03/31/23 2048   Care Cleansed with:;Soap and water;Applied:;Skin Barrier 04/01/23 2000   Dressing Changed;Foam 03/31/23 2048            Altered Skin Integrity 04/01/23 0143 Left anterior Pelvis Moisture associated dermatitis (Active)   04/01/23 0143   Altered Skin Integrity Present on Admission - Did Patient arrive to the hospital with altered skin?: yes   Side: Left   Orientation: anterior   Location: Pelvis   Wound Number:    Is this injury device related?:    Primary Wound Type: Moisture ass   Description of Altered Skin Integrity:    Ankle-Brachial Index:    Pulses:    Removal Indication and Assessment:    Wound Outcome:    (Retired) Wound Length (cm):    (Retired) Wound Width (cm):    (Retired) Depth (cm):    Wound Description (Comments):    Removal Indications:    Dressing Appearance Open to air 04/04/23 1930   Drainage Amount None 04/04/23 1930   Appearance Moist 04/04/23 1930   Care Cleansed with:;Soap and water;Applied:;Skin Barrier 04/01/23 2000            Altered Skin Integrity 04/01/23 0143 Left anterior Leg Skin Tear (Active)    04/01/23 0143   Altered Skin Integrity Present on Admission - Did Patient arrive to the hospital with altered skin?: yes   Side: Left   Orientation: anterior   Location: Leg   Wound Number:    Is this injury device related?: No   Primary Wound Type: Skin tear   Description of Altered Skin Integrity:    Ankle-Brachial Index:    Pulses:    Removal Indication and Assessment:    Wound Outcome:    (Retired) Wound Length (cm):    (Retired) Wound Width (cm):    (Retired) Depth (cm):    Wound Description (Comments):    Removal Indications:    Wound Image   04/04/23 1100   Description of Altered Skin Integrity Partial thickness tissue loss. Shallow open ulcer with a red or pink wound bed, without slough. Intact or Open/Ruptured Serum-filled blister. 04/04/23 1100   Dressing Appearance Clean;Dry;Intact 04/04/23 1930   Drainage Amount None 04/04/23 1930   Drainage Characteristics/Odor Serous 04/04/23 1100   Appearance Dressing in place, unable to visualize 04/04/23 1930   Tissue loss description Partial thickness 04/04/23 1100   Wound Edges Open 03/31/23 2048   Care Cleansed with:;Wound cleanser;Applied:;Skin Barrier 04/04/23 1100   Dressing Applied;Silver;Foam;Rolled gauze;Elastic bandage 04/04/23 1100   Dressing Change Due 04/06/23 04/04/23 1100            Altered Skin Integrity 04/01/23 0145 Right anterior Leg Skin Tear (Active)   04/01/23 0145   Altered Skin Integrity Present on Admission - Did Patient arrive to the hospital with altered skin?: yes   Side: Right   Orientation: anterior   Location: Leg   Wound Number:    Is this injury device related?:    Primary Wound Type: Skin tear   Description of Altered Skin Integrity:    Ankle-Brachial Index:    Pulses:    Removal Indication and Assessment:    Wound Outcome:    (Retired) Wound Length (cm):    (Retired) Wound Width (cm):    (Retired) Depth (cm):    Wound Description (Comments):    Removal Indications:    Wound Image   04/04/23 1100   Description of Altered Skin  Integrity Partial thickness tissue loss. Shallow open ulcer with a red or pink wound bed, without slough. Intact or Open/Ruptured Serum-filled blister. 04/04/23 1100   Dressing Appearance Clean;Dry;Intact 04/04/23 1930   Drainage Amount None 04/04/23 1930   Drainage Characteristics/Odor Serous 04/04/23 1100   Appearance Dressing in place, unable to visualize 04/04/23 1930   Tissue loss description Partial thickness 04/04/23 1100   Wound Edges Open 03/31/23 2048   Care Cleansed with:;Wound cleanser;Applied:;Skin Barrier 04/04/23 1100   Dressing Applied;Silver;Foam;Rolled gauze;Elastic bandage 04/04/23 1100   Dressing Change Due 04/06/23 04/04/23 1100            Altered Skin Integrity 04/01/23 0145 Right anterior Knee Skin Tear (Active)   04/01/23 0145   Altered Skin Integrity Present on Admission - Did Patient arrive to the hospital with altered skin?: yes   Side: Right   Orientation: anterior   Location: Knee   Wound Number:    Is this injury device related?:    Primary Wound Type: Skin tear   Description of Altered Skin Integrity:    Ankle-Brachial Index:    Pulses:    Removal Indication and Assessment:    Wound Outcome:    (Retired) Wound Length (cm):    (Retired) Wound Width (cm):    (Retired) Depth (cm):    Wound Description (Comments):    Removal Indications:    Description of Altered Skin Integrity Partial thickness tissue loss. Shallow open ulcer with a red or pink wound bed, without slough. Intact or Open/Ruptured Serum-filled blister. 04/04/23 1100   Dressing Appearance Clean;Dry;Intact 04/04/23 1930   Drainage Amount None 04/04/23 1930   Appearance Dressing in place, unable to visualize 04/04/23 1930   Wound Edges Open 03/31/23 2048   Care Cleansed with:;Wound cleanser;Applied: 04/04/23 1100   Dressing Applied 04/04/23 1100   Dressing Change Due 04/05/23 04/04/23 1100            Altered Skin Integrity 04/01/23 0146 Left Arm Skin Tear (Active)   04/01/23 0146   Altered Skin Integrity Present on Admission -  Did Patient arrive to the hospital with altered skin?: yes   Side: Left   Orientation:    Location: Arm   Wound Number:    Is this injury device related?:    Primary Wound Type: Skin tear   Description of Altered Skin Integrity:    Ankle-Brachial Index:    Pulses:    Removal Indication and Assessment:    Wound Outcome:    (Retired) Wound Length (cm):    (Retired) Wound Width (cm):    (Retired) Depth (cm):    Wound Description (Comments):    Removal Indications:    Dressing Appearance Clean;Dry;Intact 04/04/23 1930   Drainage Amount None 04/04/23 1930   Appearance Dressing in place, unable to visualize 04/04/23 1930   Wound Edges Open 03/31/23 2048   Care Cleansed with:;Wound cleanser;Applied:;Skin Barrier 04/04/23 1100   Dressing Applied 04/04/23 1100   Dressing Change Due 04/05/23 04/04/23 1100            Altered Skin Integrity 04/01/23 0147 Right Arm Skin Tear (Active)   04/01/23 0147   Altered Skin Integrity Present on Admission - Did Patient arrive to the hospital with altered skin?: yes   Side: Right   Orientation:    Location: Arm   Wound Number:    Is this injury device related?:    Primary Wound Type: Skin tear   Description of Altered Skin Integrity:    Ankle-Brachial Index:    Pulses:    Removal Indication and Assessment:    Wound Outcome:    (Retired) Wound Length (cm):    (Retired) Wound Width (cm):    (Retired) Depth (cm):    Wound Description (Comments):    Removal Indications:    Description of Altered Skin Integrity Partial thickness tissue loss. Shallow open ulcer with a red or pink wound bed, without slough. Intact or Open/Ruptured Serum-filled blister. 04/04/23 1100   Dressing Appearance Clean;Dry;Intact 04/04/23 1930   Drainage Amount None 04/04/23 1930   Appearance Dressing in place, unable to visualize 04/04/23 1930   Wound Edges Open 03/31/23 2048   Care Cleansed with:;Wound cleanser 03/31/23 2048   Dressing Foam 04/01/23 2000   Dressing Change Due 04/06/23 04/04/23 1100            Altered  Skin Integrity 04/01/23 0148 Left Upper quadrant Skin Tear (Active)   04/01/23 0148   Altered Skin Integrity Present on Admission - Did Patient arrive to the hospital with altered skin?: yes   Side: Left   Orientation:    Location: Upper quadrant   Wound Number:    Is this injury device related?:    Primary Wound Type: Skin tear   Description of Altered Skin Integrity:    Ankle-Brachial Index:    Pulses:    Removal Indication and Assessment:    Wound Outcome:    (Retired) Wound Length (cm):    (Retired) Wound Width (cm):    (Retired) Depth (cm):    Wound Description (Comments):    Removal Indications:    Description of Altered Skin Integrity Partial thickness tissue loss. Shallow open ulcer with a red or pink wound bed, without slough. Intact or Open/Ruptured Serum-filled blister. 04/04/23 1100   Dressing Appearance Clean;Dry;Intact 04/04/23 1930   Drainage Amount None 04/04/23 1930   Appearance Dressing in place, unable to visualize 04/04/23 1930   Wound Edges Open 03/31/23 2048   Care Cleansed with:;Wound cleanser 04/04/23 1100   Dressing Applied 04/04/23 1100   Dressing Change Due 04/05/23 04/04/23 1100            Altered Skin Integrity 04/01/23 0148 Right Shoulder Skin Tear (Active)   04/01/23 0148   Altered Skin Integrity Present on Admission - Did Patient arrive to the hospital with altered skin?: yes   Side: Right   Orientation:    Location: Shoulder   Wound Number:    Is this injury device related?:    Primary Wound Type: Skin tear   Description of Altered Skin Integrity:    Ankle-Brachial Index:    Pulses:    Removal Indication and Assessment:    Wound Outcome:    (Retired) Wound Length (cm):    (Retired) Wound Width (cm):    (Retired) Depth (cm):    Wound Description (Comments):    Removal Indications:    Description of Altered Skin Integrity Partial thickness tissue loss. Shallow open ulcer with a red or pink wound bed, without slough. Intact or Open/Ruptured Serum-filled blister. 04/04/23 1100   Dressing  Appearance No dressing 04/04/23 1930   Appearance Granulating 04/04/23 1930   Care Cleansed with:;Wound cleanser 04/04/23 1100   Dressing Applied 04/04/23 1100   Dressing Change Due 04/05/23 04/04/23 1100            Altered Skin Integrity 04/01/23 0153 Left Hand Abrasion(s) (Active)   04/01/23 0153   Altered Skin Integrity Present on Admission - Did Patient arrive to the hospital with altered skin?: yes   Side: Left   Orientation:    Location: Hand   Wound Number:    Is this injury device related?:    Primary Wound Type: Abrasion(s)   Description of Altered Skin Integrity:    Ankle-Brachial Index:    Pulses:    Removal Indication and Assessment:    Wound Outcome:    (Retired) Wound Length (cm):    (Retired) Wound Width (cm):    (Retired) Depth (cm):    Wound Description (Comments):    Removal Indications:    Dressing Appearance No dressing 04/04/23 1930   Appearance Granulating 04/04/23 1930   Care Cleansed with:;Soap and water;Applied:;Skin Barrier 04/01/23 2000            Altered Skin Integrity 04/01/23 0154 Right Nose Abrasion(s) (Active)   04/01/23 0154   Altered Skin Integrity Present on Admission - Did Patient arrive to the hospital with altered skin?: yes   Side: Right   Orientation:    Location: Nose   Wound Number:    Is this injury device related?:    Primary Wound Type: Abrasion(s)   Description of Altered Skin Integrity:    Ankle-Brachial Index:    Pulses:    Removal Indication and Assessment:    Wound Outcome:    (Retired) Wound Length (cm):    (Retired) Wound Width (cm):    (Retired) Depth (cm):    Wound Description (Comments):    Removal Indications:    Description of Altered Skin Integrity Partial thickness tissue loss. Shallow open ulcer with a red or pink wound bed, without slough. Intact or Open/Ruptured Serum-filled blister. 04/04/23 1100   Dressing Appearance No dressing 04/04/23 1930   Drainage Amount None 04/04/23 1100   Appearance Granulating 04/04/23 1930   Tissue loss description Partial  thickness 04/04/23 1100   Care Cleansed with:;Wound cleanser 04/04/23 1100   Dressing Applied 04/04/23 1100   Dressing Change Due 04/05/23 04/04/23 1100            Altered Skin Integrity 03/31/23 Sacral spine Ulceration Full thickness tissue loss. Base is covered by slough and/or eschar in the wound bed (Active)   03/31/23    Altered Skin Integrity Present on Admission - Did Patient arrive to the hospital with altered skin?: yes   Side:    Orientation:    Location: Sacral spine   Wound Number:    Is this injury device related?:    Primary Wound Type: Ulceration   Description of Altered Skin Integrity: Full thickness tissue loss. Base is covered by slough and/or eschar in the wound bed   Ankle-Brachial Index:    Pulses:    Removal Indication and Assessment:    Wound Outcome:    (Retired) Wound Length (cm):    (Retired) Wound Width (cm):    (Retired) Depth (cm):    Wound Description (Comments):    Removal Indications:    Dressing Appearance Clean;Dry;Intact 04/04/23 1930   Drainage Amount None 04/04/23 1930   Appearance Dressing in place, unable to visualize 04/04/23 1930            Altered Skin Integrity 03/21/23 Right anterior Toe, first Non pressure chronic ulcer Partial thickness tissue loss. Shallow open ulcer with a red or pink wound bed, without slough. Intact or Open/Ruptured Serum-filled blister. (Active)   03/21/23    Altered Skin Integrity Present on Admission - Did Patient arrive to the hospital with altered skin?: yes   Side: Right   Orientation: anterior   Location: Toe, first   Wound Number:    Is this injury device related?: No   Primary Wound Type: Non pressure   Description of Altered Skin Integrity: Partial thickness tissue loss. Shallow open ulcer with a red or pink wound bed, without slough. Intact or Open/Ruptured Serum-filled blister.   Ankle-Brachial Index:    Pulses:    Removal Indication and Assessment:    Wound Outcome:    (Retired) Wound Length (cm):    (Retired) Wound Width (cm):     (Retired) Depth (cm):    Wound Description (Comments):    Removal Indications:    Wound Image   04/04/23 1100   Description of Altered Skin Integrity Partial thickness tissue loss. Shallow open ulcer with a red or pink wound bed, without slough. Intact or Open/Ruptured Serum-filled blister. 04/04/23 1100   Dressing Appearance Moist drainage 04/04/23 1930   Drainage Amount Scant 04/04/23 1930   Drainage Characteristics/Odor Serous 04/04/23 1100   Appearance Red 04/04/23 1930   Care Cleansed with:;Wound cleanser;Applied: 04/04/23 1100   Dressing Applied;Gauze 04/04/23 1100   Dressing Change Due 04/06/23 04/04/23 1100            Altered Skin Integrity 04/04/23 Right anterior;dorsal Foot Skin Tear Partial thickness tissue loss. Shallow open ulcer with a red or pink wound bed, without slough. Intact or Open/Ruptured Serum-filled blister. (Active)   04/04/23    Altered Skin Integrity Present on Admission - Did Patient arrive to the hospital with altered skin?: yes   Side: Right   Orientation: anterior;dorsal   Location: Foot   Wound Number:    Is this injury device related?:    Primary Wound Type: Skin tear   Description of Altered Skin Integrity: Partial thickness tissue loss. Shallow open ulcer with a red or pink wound bed, without slough. Intact or Open/Ruptured Serum-filled blister.   Ankle-Brachial Index:    Pulses:    Removal Indication and Assessment:    Wound Outcome:    (Retired) Wound Length (cm):    (Retired) Wound Width (cm):    (Retired) Depth (cm):    Wound Description (Comments):    Removal Indications:    Wound Image    04/04/23 1100   Description of Altered Skin Integrity Full thickness tissue loss. Subcutaneous fat may be visible but bone, tendon or muscle are not exposed 04/04/23 1100   Dressing Appearance Dry 04/04/23 1930   Drainage Amount None 04/04/23 1930   Appearance Pink;Red 04/04/23 1930   Tissue loss description Partial thickness 04/04/23 1100   Wound Length (cm) 3.5 cm 04/04/23 1100   Wound  Width (cm) 2.5 cm 04/04/23 1100   Wound Depth (cm) 0.1 cm 04/04/23 1100   Wound Volume (cm^3) 0.875 cm^3 04/04/23 1100   Wound Surface Area (cm^2) 8.75 cm^2 04/04/23 1100   Care Cleansed with:;Wound cleanser;Applied:;Skin Barrier 04/04/23 1100   Dressing Applied;Silver;Foam 04/04/23 1100   Dressing Change Due 04/06/23 04/04/23 1100            Incision/Site 04/01/23 0135 Cervical spine (Active)   04/01/23 0135   Present Prior to Hospital Arrival?: Yes   Side:    Location: Cervical spine   Orientation:    Incision Type:    Closure Method:    Additional Comments:    Removal Indication and Assessment:    Wound Outcome:    Removal Indications:    Dressing Appearance Clean;Dry;Intact 04/04/23 1930   Drainage Amount None 04/04/23 1930   Appearance Dressing in place, unable to visualize 04/04/23 1930   Wound Edges Approximated 03/31/23 2048   Care Cleansed with:;Wound cleanser 03/31/23 2048   Dressing Island/border 04/01/23 2000           Plan:       Altered mental status  Pt experienced episode of altered mental status with decreased LOC after receiving 2 pain pills and undergoing physical therapy  Suspect this is etiology of episode   Patient back at baseline currently   Will obtain EEG and consult Neurology   Patient can not go under MRI due to recent cervical instrumentation   CT head reviewed and negative for acute findings        Closed nondisplaced fracture of sixth cervical vertebra  Per chart review pt experienced a fall on 03/17/2023 which resulted in cervical fracture with cord edema and pt underwent cervical spine fusion performed on 03/24/2023 by Dr. Kc. Pt was d/c to skilled Nursing Facility 3 days ago.   Cont PT/OT  Fall precautions      Diabetes mellitus with chronic kidney disease, stage III  Patient's FSGs are controlled on current medication regimen.  Last A1c reviewed-   Lab Results   Component Value Date    LABA1C 6.6 (H) 08/08/2016    HGBA1C 5.1 01/06/2022     Most recent fingerstick glucose  reviewed- No results for input(s): POCTGLUCOSE in the last 24 hours.  Current correctional scale  Low  Maintain anti-hyperglycemic dose as follows-   Antihyperglycemics (From admission, onward)      None          Hold Oral hypoglycemics while patient is in the hospital.    Generalized weakness  Chronic, pt with increasing deconditioning since fall and cervical fracture  Cont PT/OT  Fall precautions      Hyperlipidemia associated with type 2 diabetes mellitus  Chronic, stable  Cont home meds    Hypertension associated with diabetes  Chronic, controlled.  Latest blood pressure and vitals reviewed-   Temp:  [98.2 °F (36.8 °C)]   Pulse:  [72-73]   Resp:  [18-20]   BP: (143-154)/(67-78)   SpO2:  [96 %-97 %] .   Home meds for hypertension were reviewed and noted below.   Hypertension Medications               carvediloL (COREG) 25 MG tablet Take 1 tablet (25 mg total) by mouth 2 (two) times daily with meals.    lisinopriL (PRINIVIL,ZESTRIL) 40 MG tablet Take 1 tablet (40 mg total) by mouth every evening.    nitroGLYCERIN 0.4 MG/DOSE TL SPRY (NITROLINGUAL) 400 mcg/spray spray PLACE 1 SPRAY UNDER THE TONGUE EVERY 5 MINUTES AS NEEDED FOR CHEST PAIN            While in the hospital, will manage blood pressure as follows; Continue home antihypertensive regimen    Will utilize p.r.n. blood pressure medication only if patient's blood pressure greater than  180/110 and he develops symptoms such as worsening chest pain or shortness of breath.      MTHFR mutation (methylenetetrahydrofolate reductase)  Cont coumadin  Monitor labs      Paroxysmal atrial fibrillation  Patient with Paroxysmal (<7 days) atrial fibrillation which is controlled currently with Amiodarone. Patient is currently in sinus rhythm.[id number] Score: 4. Anticoagulation indicated. Anticoagulation done with coumadin.        Hypertension associated with diabetes  Chronic, controlled.  Latest blood pressure and vitals reviewed-   Temp:  [96.3 °F (35.7 °C)-98 °F (36.7  °C)]   Pulse:  []   Resp:  [17-20]   BP: (100-157)/(59-89)   SpO2:  [95 %-96 %] .   Home meds for hypertension were reviewed and noted below.   Hypertension Medications               carvediloL (COREG) 25 MG tablet Take 1 tablet (25 mg total) by mouth 2 (two) times daily with meals.    lisinopriL (PRINIVIL,ZESTRIL) 40 MG tablet Take 1 tablet (40 mg total) by mouth every evening.    nitroGLYCERIN 0.4 MG/DOSE TL SPRY (NITROLINGUAL) 400 mcg/spray spray PLACE 1 SPRAY UNDER THE TONGUE EVERY 5 MINUTES AS NEEDED FOR CHEST PAIN            While in the hospital, will manage blood pressure as follows; Continue home antihypertensive regimen    Will utilize p.r.n. blood pressure medication only if patient's blood pressure greater than  180/110 and he develops symptoms such as worsening chest pain or shortness of breath.    4/1 - blood pressure on lower side today, medications adjusted and will continue amiodarone and lisinopril dose decreased to 20 mg nightly.  Will continue monitor closely.  Holding beta-blocker due to mild bradycardia and low blood pressure.    Generalized weakness  Chronic, pt with increasing deconditioning since fall and cervical fracture  Cont PT/OT  Fall precautions      Altered mental status  Pt experienced episode of altered mental status with decreased LOC after receiving 2 pain pills and undergoing physical therapy  Suspect this is etiology of episode   Patient back at baseline currently   Will obtain EEG and consult Neurology   Patient can not go under MRI due to recent cervical instrumentation   CT head reviewed and negative for acute findings    4/1 - patient's mentation stable today.  Suspect episode yesterday was due to narcotics administered.  Will hold all opiates and continue to monitor overnight and if patient remains stable plan to discharge back to [place] tomorrow.        Paroxysmal atrial fibrillation  Patient with Paroxysmal (<7 days) atrial fibrillation which is controlled  currently with Amiodarone. Patient is currently in sinus rhythm.[id number] Score: 4. Anticoagulation indicated. Anticoagulation done with coumadin.        Anemia  Patient's anemia is currently controlled. Has not received any PRBCs to date.. Etiology likely d/t chronic disease and acute blood loss from surgery.  Current CBC reviewed-   Lab Results   Component Value Date    HGB 8.4 (L) 04/01/2023    HCT 25.3 (L) 04/01/2023     Monitor serial CBC and transfuse if patient becomes hemodynamically unstable, symptomatic or H/H drops below 7/21.     Check iron panel in AM        Generalized weakness  Chronic, pt with increasing deconditioning since fall and cervical fracture  Cont PT/OT  Fall precautions      Altered mental status  Pt experienced episode of altered mental status with decreased LOC after receiving 2 pain pills and undergoing physical therapy  Suspect this is etiology of episode   Patient back at baseline currently   Will obtain EEG and consult Neurology   Patient can not go under MRI due to recent cervical instrumentation   CT head reviewed and negative for acute findings    4/1 - patient's mentation stable today.  Suspect episode yesterday was due to narcotics administered.  Will hold all opiates and continue to monitor overnight and if patient remains stable plan to discharge back to [place] tomorrow.    4/2 -pts mentation remains much improved and stable today. Will cont to monitor closely and plan to d/c back to [facility] tomorrow.         Paroxysmal atrial fibrillation  Patient with Paroxysmal (<7 days) atrial fibrillation which is controlled currently with Amiodarone. Patient is currently in sinus rhythm.[id number] Score: 4. Anticoagulation indicated. Anticoagulation done with coumadin.    Pts INR subtherapeutic, increase dose to 5mg and monitor        MTHFR mutation (methylenetetrahydrofolate reductase)  Cont coumadin  Monitor labs      Generalized weakness  Chronic, pt with increasing  deconditioning since fall and cervical fracture  Cont PT/OT  Fall precautions        Chronic, pt with increasing deconditioning since fall and cervical fracture  Cont PT/OT  Fall precautions      Altered mental status  Pt experienced episode of altered mental status with decreased LOC after receiving 2 pain pills and undergoing physical therapy  Suspect this is etiology of episode   Patient back at baseline currently   Will obtain EEG and consult Neurology   Patient can not go under MRI due to recent cervical instrumentation   CT head reviewed and negative for acute findings    4/1 - patient's mentation stable today.  Suspect episode yesterday was due to narcotics administered.  Will hold all opiates and continue to monitor overnight and if patient remains stable plan to discharge back to [place] tomorrow.    4/2 -pts mentation remains much improved and stable today. Will cont to monitor closely and plan to d/c back to [facility] tomorrow.     4/4 - pt has int confusion and sundowning, suspect hospital delirium due to prolonged stay. Pt calm and cooperative. Reorients to situation well and is oriented x 3. Will cont to monitor closely.          Follow-up Information       Familia Ramirez Jr, MD Follow up in 1 week(s).    Specialty: Family Medicine  Why: hospital follow up  Contact information:  [address]  [address]  [place] [id number] [phone]               Kai Ortiz MD Follow up in 3 day(s).    Specialties: Hematology, Hematology and Oncology, Oncology  Why: hospital follow up and coumadin monitoring  Contact information:  [address]  [address]  [place] [address]  [phone]                             Assessment and Recommendations         Diagnoses:    1. BL LE multiple skin tears  2. BL UE multiple skin tears  3. Sacral un-stagable pressure ulcer     Plan:  1. Urgotul + foam to all UE and LE open wounds  2. Aquacell + foam dressing daily

## 2023-04-06 VITALS
DIASTOLIC BLOOD PRESSURE: 60 MMHG | HEIGHT: 66 IN | TEMPERATURE: 99 F | WEIGHT: 229.25 LBS | SYSTOLIC BLOOD PRESSURE: 98 MMHG | RESPIRATION RATE: 18 BRPM | HEART RATE: 58 BPM | OXYGEN SATURATION: 97 % | BODY MASS INDEX: 36.84 KG/M2

## 2023-04-06 PROBLEM — G95.20 SPINAL CORD COMPRESSION: Status: RESOLVED | Noted: 2023-03-22 | Resolved: 2023-04-06

## 2023-04-06 PROBLEM — T50.905A BRADYCARDIA, DRUG INDUCED: Status: RESOLVED | Noted: 2020-01-27 | Resolved: 2023-04-06

## 2023-04-06 PROBLEM — R00.1 BRADYCARDIA, DRUG INDUCED: Status: RESOLVED | Noted: 2020-01-27 | Resolved: 2023-04-06

## 2023-04-06 LAB
ALBUMIN SERPL BCP-MCNC: 2.2 G/DL (ref 3.5–5.2)
ALP SERPL-CCNC: 77 U/L (ref 55–135)
ALT SERPL W/O P-5'-P-CCNC: 21 U/L (ref 10–44)
ANION GAP SERPL CALC-SCNC: 7 MMOL/L (ref 8–16)
AST SERPL-CCNC: 33 U/L (ref 10–40)
BASOPHILS # BLD AUTO: 0.02 K/UL (ref 0–0.2)
BASOPHILS NFR BLD: 0.2 % (ref 0–1.9)
BILIRUB SERPL-MCNC: 0.6 MG/DL (ref 0.1–1)
BUN SERPL-MCNC: 33 MG/DL (ref 8–23)
CALCIUM SERPL-MCNC: 8.7 MG/DL (ref 8.7–10.5)
CHLORIDE SERPL-SCNC: 109 MMOL/L (ref 95–110)
CO2 SERPL-SCNC: 23 MMOL/L (ref 23–29)
CREAT SERPL-MCNC: 1.4 MG/DL (ref 0.5–1.4)
DIFFERENTIAL METHOD: ABNORMAL
EOSINOPHIL # BLD AUTO: 0.2 K/UL (ref 0–0.5)
EOSINOPHIL NFR BLD: 2.1 % (ref 0–8)
ERYTHROCYTE [DISTWIDTH] IN BLOOD BY AUTOMATED COUNT: 15.9 % (ref 11.5–14.5)
EST. GFR  (NO RACE VARIABLE): 52 ML/MIN/1.73 M^2
GLUCOSE SERPL-MCNC: 115 MG/DL (ref 70–110)
HCT VFR BLD AUTO: 24.5 % (ref 40–54)
HGB BLD-MCNC: 8.2 G/DL (ref 14–18)
IMM GRANULOCYTES # BLD AUTO: 0.11 K/UL (ref 0–0.04)
IMM GRANULOCYTES NFR BLD AUTO: 1.3 % (ref 0–0.5)
INR PPP: 1.9 (ref 0.8–1.2)
LYMPHOCYTES # BLD AUTO: 0.9 K/UL (ref 1–4.8)
LYMPHOCYTES NFR BLD: 10.8 % (ref 18–48)
MAGNESIUM SERPL-MCNC: 1.8 MG/DL (ref 1.6–2.6)
MCH RBC QN AUTO: 33.3 PG (ref 27–31)
MCHC RBC AUTO-ENTMCNC: 33.5 G/DL (ref 32–36)
MCV RBC AUTO: 100 FL (ref 82–98)
MONOCYTES # BLD AUTO: 0.5 K/UL (ref 0.3–1)
MONOCYTES NFR BLD: 5.2 % (ref 4–15)
NEUTROPHILS # BLD AUTO: 7 K/UL (ref 1.8–7.7)
NEUTROPHILS NFR BLD: 80.4 % (ref 38–73)
NRBC BLD-RTO: 0 /100 WBC
PHOSPHATE SERPL-MCNC: 2.9 MG/DL (ref 2.7–4.5)
PLATELET # BLD AUTO: 303 K/UL (ref 150–450)
PMV BLD AUTO: 10.1 FL (ref 9.2–12.9)
POCT GLUCOSE: 118 MG/DL (ref 70–110)
POTASSIUM SERPL-SCNC: 3.9 MMOL/L (ref 3.5–5.1)
PROT SERPL-MCNC: 5.2 G/DL (ref 6–8.4)
PROTHROMBIN TIME: 19.8 SEC (ref 9–12.5)
RBC # BLD AUTO: 2.46 M/UL (ref 4.6–6.2)
SODIUM SERPL-SCNC: 139 MMOL/L (ref 136–145)
VIT B1 BLD-MCNC: 65 UG/L (ref 38–122)
WBC # BLD AUTO: 8.71 K/UL (ref 3.9–12.7)

## 2023-04-06 PROCEDURE — 84100 ASSAY OF PHOSPHORUS: CPT | Performed by: STUDENT IN AN ORGANIZED HEALTH CARE EDUCATION/TRAINING PROGRAM

## 2023-04-06 PROCEDURE — 85025 COMPLETE CBC W/AUTO DIFF WBC: CPT | Performed by: STUDENT IN AN ORGANIZED HEALTH CARE EDUCATION/TRAINING PROGRAM

## 2023-04-06 PROCEDURE — 80053 COMPREHEN METABOLIC PANEL: CPT | Performed by: STUDENT IN AN ORGANIZED HEALTH CARE EDUCATION/TRAINING PROGRAM

## 2023-04-06 PROCEDURE — G0378 HOSPITAL OBSERVATION PER HR: HCPCS

## 2023-04-06 PROCEDURE — 92611 MOTION FLUOROSCOPY/SWALLOW: CPT

## 2023-04-06 PROCEDURE — 97530 THERAPEUTIC ACTIVITIES: CPT | Mod: CQ

## 2023-04-06 PROCEDURE — 94761 N-INVAS EAR/PLS OXIMETRY MLT: CPT

## 2023-04-06 PROCEDURE — 25000003 PHARM REV CODE 250: Performed by: STUDENT IN AN ORGANIZED HEALTH CARE EDUCATION/TRAINING PROGRAM

## 2023-04-06 PROCEDURE — 85610 PROTHROMBIN TIME: CPT | Performed by: STUDENT IN AN ORGANIZED HEALTH CARE EDUCATION/TRAINING PROGRAM

## 2023-04-06 PROCEDURE — 36415 COLL VENOUS BLD VENIPUNCTURE: CPT | Performed by: STUDENT IN AN ORGANIZED HEALTH CARE EDUCATION/TRAINING PROGRAM

## 2023-04-06 PROCEDURE — 25000003 PHARM REV CODE 250: Performed by: NURSE PRACTITIONER

## 2023-04-06 PROCEDURE — 97110 THERAPEUTIC EXERCISES: CPT | Mod: CQ

## 2023-04-06 PROCEDURE — 83735 ASSAY OF MAGNESIUM: CPT | Performed by: STUDENT IN AN ORGANIZED HEALTH CARE EDUCATION/TRAINING PROGRAM

## 2023-04-06 RX ORDER — CARVEDILOL 25 MG/1
25 TABLET ORAL 2 TIMES DAILY
Qty: 60 TABLET | Refills: 11
Start: 2023-04-06 | End: 2023-04-06 | Stop reason: HOSPADM

## 2023-04-06 RX ORDER — WARFARIN SODIUM 5 MG/1
5 TABLET ORAL DAILY
Qty: 30 TABLET | Refills: 11 | Status: ON HOLD
Start: 2023-04-06 | End: 2023-04-28 | Stop reason: HOSPADM

## 2023-04-06 RX ADMIN — Medication 1 TABLET: at 08:04

## 2023-04-06 RX ADMIN — ASPIRIN 81 MG: 81 TABLET, COATED ORAL at 08:04

## 2023-04-06 RX ADMIN — AMIODARONE HYDROCHLORIDE 200 MG: 100 TABLET ORAL at 08:04

## 2023-04-06 RX ADMIN — CALCITRIOL CAPSULES 0.25 MCG 0.75 MCG: 0.25 CAPSULE ORAL at 08:04

## 2023-04-06 RX ADMIN — PANTOPRAZOLE SODIUM 40 MG: 40 TABLET, DELAYED RELEASE ORAL at 08:04

## 2023-04-06 RX ADMIN — ATORVASTATIN CALCIUM 80 MG: 40 TABLET, FILM COATED ORAL at 08:04

## 2023-04-06 RX ADMIN — FLUTICASONE PROPIONATE 100 MCG: 50 SPRAY, METERED NASAL at 08:04

## 2023-04-06 RX ADMIN — CLOPIDOGREL BISULFATE 75 MG: 75 TABLET, FILM COATED ORAL at 08:04

## 2023-04-06 RX ADMIN — Medication 400 MG: at 08:04

## 2023-04-06 RX ADMIN — FERROUS SULFATE TAB 325 MG (65 MG ELEMENTAL FE) 1 EACH: 325 (65 FE) TAB at 08:04

## 2023-04-06 RX ADMIN — OXYBUTYNIN CHLORIDE 5 MG: 5 TABLET, EXTENDED RELEASE ORAL at 08:04

## 2023-04-06 RX ADMIN — CARVEDILOL 25 MG: 6.25 TABLET, FILM COATED ORAL at 08:04

## 2023-04-06 NOTE — PLAN OF CARE
Plan of care reviewed with patient. Needs reinforcement due to confusion. IV intact and patent. Glucose monitored and covered if needed per sliding scale. Avasys in room for increased safety. Call light in reach and frequent rounding done to meet patient needs. Will continue to monitor.

## 2023-04-06 NOTE — DISCHARGE SUMMARY
"[facility] Medicine  Discharge Summary      Patient Name: Richard Bustos  MRN: [id number]  JUNIOR: [id number]  Patient Class: OP- Observation  Admission Date: 3/31/2023  Hospital Length of Stay: 0 days  Discharge Date and Time: No discharge date for patient encounter.  Attending Physician: Bill Gamino MD   Discharging Provider: Bill Gamino MD  Primary Care Provider: Familia Ramirez Jr, MD    Primary Care Team: Networked reference to record PCT     HPI:   Richard Bustos is a 77 yr old male with PMHx significant for  PMHx of HTN, CAD s/p cardiac stents, lumbar DDD with chronic bilat LE weakness, HLD, gout, MI, CHF, arthritis, A-fib, on Coumadin, DM II, dementia, and MTHFR mutation who presents to the ED via EMS from [facility].  History obtained by ED physician due to pts underlying dementia. Per ED staff daughter reports after arriving to the half-way earlier today, she noticed the patient was "starring off into space" ~20 minutes after a PT session.  EMS states upon their arrival on scene, staff mentioned the patient's respiratory rate dropped to 6 with an associated systolic pressure of 70 mmHg.  EMS reported the nursing home personnel described an episode "resembling a seizure without jerking" prior to EMS arrival.  Pt had  normal CBG en route to the ED.  Upon arrival to ED pt was awake and alert and responding appropriately and bp was stable. Per chart review pt experienced a fall on 03/17/2023 which resulted in cervical fracture with cord edema and pt underwent cervical spine fusion performed on 03/24/2023 by Dr. Kc. Pt was d/c to skilled Nursing Facility 3 days ago.  Patient recalls working with PT this morning.  He reports he received 2 pain pills prior to his PT session and this caused him extreme fatigue and he believes this is the cause of his episode today.  Patient denies any acute complaints at this time.  He reports chronic weakness that is the same as prior to " his surgery.  Patient is pleasantly confused but oriented to name, date of birth and year. Family not present at BS. Pt reports he feels back as baseline. Pt will be admitted to hospital medicine services for further workup and management.      * No surgery found *      Hospital Course:   Richard Bustos is a 77 year old male with a past medical history of HTN, CAD s/p PCI, HLD, CHF, Afib, DM, obesity, MTHFR mutation, recent cervical neck fracture s/p repair, dementia, gout, and OA who presented with altered mental status from [facility] thought to be secondary to opiate overuse. His mental status improved during his course and he was able to work with PT/OT. He was discharged back to SNF 4/6/2023 and will follow up with his PCP, Neurosurgery, Wound Care, and Hematology.        Goals of Care Treatment Preferences:  Code Status: Full Code    Living Will: Yes              Consults:   Consults (From admission, onward)        Status Ordering Provider     Inpatient consult to Social Work/Case Management  Once        Provider:  (Not yet assigned)    Acknowledged HUSMA STARK     Inpatient consult to Social Work/Case Management  Once        Provider:  (Not yet assigned)    Completed HUSAM STARK          Neuro  Closed nondisplaced fracture of sixth cervical vertebra  S/p cervical fusion 3/24.  -Follow up with Dr. Kc 4/10    Dementia without behavioral disturbance  Chronic. Stable.      Cardiac/Vascular  Chronic systolic congestive heart failure  -Coreg  -Lisinopril  -Telemetry      Paroxysmal atrial fibrillation  -Telemetry  -Coumadin  -Amiodarone  -Coreg      Hyperlipidemia associated with type 2 diabetes mellitus  -Continue statin, Plavix and ASA    Hypertension associated with diabetes  -Continue Coreg and lisinopril  -Continue to monitor    Renal/  OAB (overactive bladder)  -Oxybutynin      CKD (chronic kidney disease) stage 3, GFR 30-59 ml/min, 41  Stable.      Oncology  Anemia  Stable.   -Trend  Hgb with CBC    Endocrine  Obesity (BMI 30-39.9)  Body mass index is 37.01 kg/m². Morbid obesity complicates all aspects of disease management from diagnostic modalities to treatment.       Diabetes mellitus with chronic kidney disease, stage III  Patient's FSGs are controlled on current medication regimen.  Last A1c reviewed-   Lab Results   Component Value Date    LABA1C 6.6 (H) 08/08/2016    HGBA1C 5.1 01/06/2022     Most recent fingerstick glucose reviewed-   Recent Labs   Lab 04/05/23  1053 04/05/23  1648 04/05/23 2037   POCTGLUCOSE 136* 103 97     Current correctional scale  Low  Maintain anti-hyperglycemic dose as follows-   Antihyperglycemics (From admission, onward)    Start     Stop Route Frequency Ordered    03/31/23 1814  insulin aspart U-100 pen 0-5 Units         -- SubQ Before meals & nightly PRN 03/31/23 1814        Hold Oral hypoglycemics while patient is in the hospital.    GI  GERD (gastroesophageal reflux disease)  -PPI      Orthopedic  Gout  -Continue home allopurinol      Other  Warfarin anticoagulation  INR 1.9.  -5 mg daily  -Will need INR checked in three days from discharge; goal 2-3    Generalized weakness  -PT/OT; discharge to SNF  -Fall precautions      MTHFR mutation (methylenetetrahydrofolate reductase)  -Coumadin  -INR daily        Final Active Diagnoses:    Diagnosis Date Noted POA    Anemia [D64.9] 04/01/2023 Yes    Closed nondisplaced fracture of sixth cervical vertebra [S12.501A] 03/21/2023 Yes    Dementia without behavioral disturbance [F03.90] 03/14/2021 Yes    Warfarin anticoagulation [Z79.01] 11/17/2020 Not Applicable    OAB (overactive bladder) [N32.81] 03/31/2020 Yes    Chronic systolic congestive heart failure [I50.22] 10/19/2019 Yes    Generalized weakness [R53.1] 12/06/2016 Yes    Paroxysmal atrial fibrillation [I48.0] 11/21/2014 Yes    CKD (chronic kidney disease) stage 3, GFR 30-59 ml/min, 41 [N18.30] 01/23/2013 Yes    Obesity (BMI 30-39.9) [E66.9] 07/10/2012  Yes    Diabetes mellitus with chronic kidney disease, stage III [E11.22] 12/16/2011 Yes     Chronic    MTHFR mutation (methylenetetrahydrofolate reductase) [Z15.89] 12/16/2011 Not Applicable    Hypertension associated with diabetes [E11.59, I15.2] 12/16/2011 Yes    Hyperlipidemia associated with type 2 diabetes mellitus [E11.69, E78.5] 12/16/2011 Yes    Gout [M10.9] 12/16/2011 Yes    GERD (gastroesophageal reflux disease) [K21.9] 12/16/2011 Yes      Problems Resolved During this Admission:    Diagnosis Date Noted Date Resolved POA    PRINCIPAL PROBLEM:  Altered mental status [R41.82] 03/31/2023 04/05/2023 Yes       Discharged Condition: stable    Disposition: Skilled Nursing Facility    Follow Up:   Follow-up Information     Familia Ramirez Jr, MD Follow up in 1 week(s).    Specialty: Family Medicine  Why: hospital follow up  Contact information:  [address]  [address]  [place] [address]  [phone]             Kai Ortiz MD Follow up in 3 day(s).    Specialties: Hematology, Hematology and Oncology, Oncology  Why: hospital follow up and coumadin monitoring  Contact information:  [address]  [address]  [place] [address]  [phone]             Kike Kc DO Follow up on 4/10/2023.    Specialty: Neurosurgery  Contact information:  [address] [place] [address]  [address]  [place] [address]  [phone]                       Patient Instructions:      Ambulatory referral/consult to Wound Clinic   Standing Status: Future   Referral Priority: Routine Referral Type: Consultation   Referral Reason: Specialty Services Required   Referred to Provider: AGATA WATSON Requested Specialty: Wound Care   Number of Visits Requested: 1     Diet Dysphagia Soft     Notify your health care provider if you experience any of the following:  increased confusion or weakness     Notify your health care provider if you experience any of the following:  persistent dizziness, light-headedness, or visual disturbances      Notify your health care provider if you experience any of the following:  severe persistent headache     Notify your health care provider if you experience any of the following:  difficulty breathing or increased cough     Notify your health care provider if you experience any of the following:  severe uncontrolled pain     Notify your health care provider if you experience any of the following:  persistent nausea and vomiting or diarrhea     Notify your health care provider if you experience any of the following:  temperature >100.4     Activity as tolerated       Significant Diagnostic Studies: Labs: All labs within the past 24 hours have been reviewed    Pending Diagnostic Studies:     Procedure Component Value Units Date/Time    Fl Modified Barium Swallow Speech [[id number]]     Order Status: Sent Lab Status: No result     Vitamin B1 [[id number]] Collected: 04/01/23 1107    Order Status: Sent Lab Status: In process Updated: 04/01/23 1120    Specimen: Blood          Medications:  Reconciled Home Medications:      Medication List      CHANGE how you take these medications    calcitRIOL 0.5 MCG Cap  Commonly known as: ROCALTROL  Take 1 capsule (0.5 mcg total) by mouth once daily.  What changed:   · how much to take  · how to take this     carvediloL 25 MG tablet  Commonly known as: COREG  Take 1 tablet (25 mg total) by mouth 2 (two) times daily.  What changed: when to take this     lisinopriL 20 MG tablet  Commonly known as: PRINIVIL,ZESTRIL  Take 1 tablet (20 mg total) by mouth every evening.  What changed:   · medication strength  · how much to take     * warfarin 5 MG tablet  Commonly known as: COUMADIN  Take 1 tablet (5 mg total) by mouth Daily.  What changed:   · how much to take  · how to take this  · when to take this  · additional instructions     * warfarin 5 MG tablet  Commonly known as: COUMADIN  Take 1 tablet (5 mg total) by mouth Daily.  What changed: You were already taking a medication with the  same name, and this prescription was added. Make sure you understand how and when to take each.         * This list has 2 medication(s) that are the same as other medications prescribed for you. Read the directions carefully, and ask your doctor or other care provider to review them with you.            CONTINUE taking these medications    allopurinoL 100 MG tablet  Commonly known as: ZYLOPRIM  Take 1 tablet (100 mg total) by mouth every evening.     amiodarone 200 MG Tab  Commonly known as: PACERONE  Take 1 tablet (200 mg total) by mouth 2 (two) times daily.     aspirin 81 MG EC tablet  Commonly known as: ECOTRIN  Take 81 mg by mouth once daily.     atorvastatin 80 MG tablet  Commonly known as: LIPITOR  Take 1 tablet (80 mg total) by mouth once daily.     CENTRUM SILVER ORAL  Take 1 tablet by mouth once daily.     clopidogreL 75 mg tablet  Commonly known as: PLAVIX  Take 1 tablet (75 mg total) by mouth once daily.     famotidine 40 MG tablet  Commonly known as: PEPCID  Take 1 tablet (40 mg total) by mouth once daily.     ferrous sulfate 325 mg (65 mg iron) Tab tablet  Commonly known as: FeroSuL  TAKE 1 TABLET BY MOUTH TWICE DAILY     fluticasone propionate 50 mcg/actuation nasal spray  Commonly known as: FLONASE  SHAKE LIQUID AND USE 2 SPRAYS(100 MCG) IN EACH NOSTRIL EVERY DAY     L-METHYL-B6-B12 3-35-2 mg Tab  Generic drug: mecobal-levomefolat Ca-B6 phos  Take 1 tablet by mouth 2 (two) times daily.     magnesium oxide 400 mg (241.3 mg magnesium) tablet  Commonly known as: MAG-OX  Take 1 tablet (400 mg total) by mouth once daily.     MYRBETRIQ 50 mg Tb24  Generic drug: mirabegron  Take 1 tablet (50 mg total) by mouth once daily.     nitroGLYCERIN 0.4 MG/DOSE TL SPRY 400 mcg/spray spray  Commonly known as: NITROLINGUAL  PLACE 1 SPRAY UNDER THE TONGUE EVERY 5 MINUTES AS NEEDED FOR CHEST PAIN     omeprazole 20 MG capsule  Commonly known as: PRILOSEC  Take 1 capsule (20 mg total) by mouth once daily.     prothrombin  time/INR test metr Misc  1 Stick by Misc.(Non-Drug; Combo Route) route every 30 days.        STOP taking these medications    HYDROcodone-acetaminophen 5-325 mg per tablet  Commonly known as: NORCO     LIDOcaine 5 %  Commonly known as: LIDODERM     nystatin powder  Commonly known as: MYCOSTATIN     QUEtiapine 25 MG Tab  Commonly known as: SEROQUEL            Indwelling Lines/Drains at time of discharge:   Lines/Drains/Airways     None                 Time spent on the discharge of patient: 33 minutes         Bill Gamino MD  Department of Hospital Medicine  [facility]

## 2023-04-06 NOTE — PT/OT/SLP EVAL
"MODIFIED BARIUM SWALLOW STUDY  (MBSS)    Date of Evaluation: 04/06/2023     Name: Richard Bustos   MRN: [id number]    Diagnosis: Altered mental status    Recommendations:     Consistency Recommendations:  Thin liquids (IDDSI 0) and Mechanical soft textures (IDDSI 5) and NO bread, NO fresh/canned fruits x bananas, NO mixed consistencies (e.g. cereal, soup, etc), meds CRUSHED  Aspiration Precautions: use good oral hygiene , sit upright for all PO intake, increase physical mobility as tolerated, small bites and sips, multiple swallows per bolus, remain upright for at least 1/2 hours following any PO intake, and ST to train use of SGS  Therapy: Dysphagia therapy is recommended including Chin Tuck Against Resistance (CTAR), Supraglottic Swallow, Mendelsohn, and Shaker.  Communication Strategies: none    Subjective   Per HPI: Richard Bustos is a 77 yr old male with PMHx significant for  PMHx of HTN, CAD s/p cardiac stents, lumbar DDD with chronic bilat LE weakness, HLD, gout, MI, CHF, arthritis, A-fib, on Coumadin, DM II, dementia, and MTHFR mutation who presents to the ED via EMS from [facility].  History obtained by ED physician due to pts underlying dementia. Per ED staff daughter reports after arriving to the [facility] earlier today, she noticed the patient was "starring off into space" ~20 minutes after a PT session.  EMS states upon their arrival on scene, staff mentioned the patient's respiratory rate dropped to 6 with an associated systolic pressure of 70 mmHg.  EMS reported the nursing home personnel described an episode "resembling a seizure without jerking" prior to EMS arrival.  Pt had  normal CBG en route to the ED.  Upon arrival to ED pt was awake and alert and responding appropriately and bp was stable. Per chart review pt experienced a fall on 03/17/2023 which resulted in cervical fracture with cord edema and pt underwent cervical spine fusion performed on 03/24/2023 by Dr. Kc. Pt was d/c to skilled " Nursing Facility 3 days ago.  Patient recalls working with PT this morning.  He reports he received 2 pain pills prior to his PT session and this caused him extreme fatigue and he believes this is the cause of his episode today.  Patient denies any acute complaints at this time.  He reports chronic weakness that is the same as prior to his surgery.  Patient is pleasantly confused but oriented to name, date of birth and year. Family not present at BS. Pt reports he feels back as baseline. Pt will be admitted to hospital medicine services for further workup and management.       Past Medical History: Richard Bustos  has a past medical history of Anemia, Anemia of other chronic disease (09/13/2017), Anemia, chronic renal failure (09/13/2017), Anemia, unspecified (09/13/2017), Anticoagulant long-term use, Aorta aneurysm, Arthritis, Atrial fibrillation, Bacteremia due to Streptococcus (01/08/2022), CAD (coronary artery disease), CHF (congestive heart failure), Chronic kidney disease, Clotting disorder (09/13/2017), Colon polyp, Dementia, Diabetes mellitus, Diabetes mellitus type II, Diverticulosis, Elevated PSA, Encounter for blood transfusion, Former smoker, General anesthetics causing adverse effect in therapeutic use, GERD (gastroesophageal reflux disease), Gout, colonic polyps, Hypercoagulable state, Hyperlipidemia, Hypertension, MI, old (2010), MTHFR mutation, Myocardial infarction, Osteomyelitis of ankle or foot (03/09/2017), Prostate cancer (06/2016), Sleep apnea, Squamous cell carcinoma (01/23/2018), and Venous stasis.  Richard Bustos  has a past surgical history that includes Stents (8/18/2010); IVC filter retrieval; Rotator cuff repair; Gastric bypass (2/5/2008); Upper gastrointestinal endoscopy (02/2011); Colonoscopy (02/2011); Colonoscopy (N/A, 8/24/2016); Cardiac surgery; Abdominal surgery; Joint replacement (1996 and 2001); Radiofrequency ablation of lumbar medial branch nerve at single level (Bilateral,  "1/10/2020); Epidural steroid injection into lumbar spine (N/A, 6/22/2020); Caudal epidural steroid injection (N/A, 6/22/2020); Epidural steroid injection into lumbar spine (N/A, 9/21/2020); Colonoscopy (N/A, 11/18/2020); Colonoscopy (N/A, 10/27/2021); Radiofrequency ablation of lumbar medial branch nerve at single level (Bilateral, 11/23/2021); and fusion, spine, cervical (N/A, 3/24/2023).    The patient is a 77 y.o. male who complains of difficulty swallowing solids and difficulty swallowing liquids.     History--see clinical swallow evlauation 4/5/23    The following observations were made:   -Mental status: Alert and Cooperative  -Factors affecting performance: no difficulties participating in the study  -Feeding Method: dependent for feeding  -Cervical collar removed for PO presentations during MBSS. Cervical in place during transport and transfers    Respiratory Status:   -Respiratory Status: room air    Pain Scale:  0/10 on VAS currently.       Objective     Modified Barium Swallow Study  Purpose: to evaluate anatomy and physiology of the oropharyngeal swallow, to determine effectiveness of rehabilitation strategies, and to determine diet consistency and intervention recommendations. The study was performed using the "Gold Standard" of 30 fps with as low as reasonably achievable (ALARA) exposure.     The patient was seen in radiology seated in White's position in a video imaging chair for lateral views of the larynx. The study was conducted using Varibar thin liquid (IDDSI 0), Varibar nectar liquid (IDDSI 2), Varibar pudding (IDDSI 4), and Peaches covered in Varibar powder (IDDSI 6/2). Cervical hardware noted to C3-T2, as well as surgical clips/staples to posterior neck.  He tolerated the procedure well.     Oral Musculature Evaluation:  Oral Musculature Evaluation  Oral Musculature: left weakness (mild)  Dentition: scattered dentition, teeth in poor condition  Secretion Management: adequate  Mucosal Quality: " adequate  Mandibular Strength and Mobility: WFL  Oral Labial Strength and Mobility: WFL (reduced retraction on L)  Lingual Strength and Mobility: impaired strength (lingual deviation upon protrusion)  Velar Elevation: WFL  Volitional Cough: fair  Volitional Swallow: able to palpate laryngeal rise  Voice Prior to PO Intake: hoarse     CONSISTENCIES ADMINISTERED:    Consistency  Presentation  Findings Rosenbeck's Penetration/Aspiration Scale (PAS) Strategy Attempted    Thin (IDDSI 0) 5ml x2 ,10 ml x3 ,regulated cup sip x2 ,regulated straw sip x2 ,Rapid consecutive straw sip x1    Views:  - Lateral view         Oral phase: decreased bolus control, premature spillage to the valleculae and pyriform sinus, and trace BOT stasis    Pharyngeal phase:  trace residue noted in the vallecula which was minimally reduced by cued subsequent swallow, trace residue noted in the pyriform sinus which was minimally reduced by cued subsequent swallow, and aspiration noted during the swallow (during subsequent swallow of pyriform sinus stasis)    Aspiration x1 of 10mL during subsequent swallow 2° pyriform sinus stasis. Trace, high penetration x1 via cup, TRACE deep penetration after the swallow x1 via cup, x1 via straw and during consecutive straw sips    Reduced duration of UES opening Best: (1) Material does not enter the airway    Worst: (7) Material enters the airway, passes below the vocal folds, and is not ejected from the trachea despite effort   Supraglottic swallow- inconsistently effective in clearing material from laryngeal vestibule   Nectar thick (mildly thick/IDDSI 2) 5ml x2 10 ml x2 Self-regulated straw sip x2    Views:  - Lateral view   Oral phase: decreased bolus control and premature spillage to the pyriform sinus    Pharyngeal phase:  mild residue noted in the vallecula which was minimally reduced by subsequent swallow, mild residue noted in the pyriform sinus which was minimally reduced by subsequent swallow, and flash  penetration noted during the swallow via straw x1    Reduced duration of UES opening   Best: (1) Material does not enter the airway    Worst: (3) Material enters the airway, remains above the vocal folds, and is not ejected from the airway   Supraglottic swallow- inconsistently effective   Puree (extremely thick/ IDDSI 4) 5ml x2      Views:  - Lateral view   Oral phase: WFL    Pharyngeal phase:  moderate residue noted in the vallecula which was minimally reduced by subsequent swallow and moderate residue noted in the pyriform sinus which was minimally reduced by subsequent swallow    Reduced duration of UES opening   Best: (1) Material does not enter the airway    Worst: (1) Material does not enter the airway   Effortful swallow-.   Mixed consistency (thin/ IDDSI 0 + soft and bite sized/ IDDSI 6) - 2 peaches in juice x2    View:  - Lateral view   Oral phase:  Poorly masticated    Pharyngeal phase:  severe residue noted in the vallecula which was not reduced by subsequent swallow, severe residue noted in the pyriform sinus which was not reduced by subsequent swallow, and deep penetration noted during the swallow of fruit juice on subsequent swallow    Reduced duration of UES opening   Best: (1) Material does not enter the airway    Worst: (5) Material enters the airway, contacts the vocal folds, and is not ejected from the airway          Treatment     Patient educated regarding results and recommendations of the evaluation. See the recommendations section below.    Education: Plan of Care, role of SLP in care, and anatomy and physiology of swallow mechanism as it relates to MBSS findings and recommendations were discussed with the patient. Patient expressed understanding. All questions were answered.     Assessment     Richard Bustos is a 77 y.o. male referred for Modified Barium Swallow Study with a medical diagnosis of Altered mental status. The patient presents with moderate oropharyngeal dysphagia as determined  by the Dysphagia     Impressions: MBSS completed. Cervical collar removed for study. Patient presents with moderate oropharyngeal dysphagia c/b reduced BOT retraction, decreased bolus control with premature loss, mild swallow delay, decreased laryngeal elevation/hyolaryngeal excursion and reduced PPW contraction. Aspiration noted with thin liquids during subsequent swallow 2° pyriform sinus stasis. Inconsistent penetration noted with thin and nectar thick liquids. Cued throat clears were noted to be inconsistently effective in clearing material from laryngeal vestibule.  Consistent trace-severe valleculae/pyriform sinus stasis noted; increased with increased viscosity. Spontaneous and cued, effortful, subsequent swallows were somewhat effective in reducing stasis, however, pt unable to completely clear. Poor/incomplete mastication of soft trials (peaches) was observed resulting in severe valleculae/pyriform sinus stasis. Please see chart above for further details. REC Mech soft (IDDSI 5) with thin liquids. No mixed consistencies, no fresh/canned fruits x bananas, no bread. Meds crushed. Strict aspiration precautions. Diet changed. ST follow up at next level of care.     Prognosis: Good    Barriers: None    Note: Conditions during a Modified barium Swallow Study are often considered optimal. This includes position of patient, timing and control of bolus, environment, and cueing for any necessary strategies    Goals:  Multidisciplinary Problems       SLP Goals          Problem: SLP    Goal Priority Disciplines Outcome   SLP Goal     SLP Ongoing, Progressing   Description: 1. Pt will undergo MBSS to objectively assess swallow physiology and determine presence/severity of dysphagia.                         Plan:   Patient to be seen:  Therapy Frequency: 5 x/week   Plan of Care expires:  04/13/23  Plan of Care reviewed with:  patient        Discharge recommendations:  nursing facility, skilled   Barriers to Discharge:   Level of Skilled Assistance Needed .    Time Tracking:   SLP Treatment Date:   04/06/23  Speech Start Time:  0955  Speech Stop Time:  1037     Speech Total Time (min):  42 min      04/06/2023  Therapist's Name:   Nohemy Castorena CCC-SLP   Speech Language Pathologist

## 2023-04-06 NOTE — PT/OT/SLP PROGRESS
Physical Therapy Treatment    Patient Name:  Richard Bustos   MRN:  [id number]    Recommendations:     Discharge Recommendations: nursing facility, skilled, LTACH (long-term acute care hospital)  Discharge Equipment Recommendations: none  Barriers to discharge: None    Assessment:     Richard Bustos is a 77 y.o. male admitted with a medical diagnosis of Altered mental status.  He presents with the following impairments/functional limitations: weakness, impaired endurance, impaired self care skills, impaired functional mobility, impaired balance, decreased upper extremity function, decreased lower extremity function, decreased safety awareness, pain, decreased ROM, orthopedic precautions . Awake, alert, presents with some confusion ( seeing things like cans in the bed  and requesting to call his ride to go somewhere). Able to accurately state names of his children and call out their phone numbers with time and concentration. Transferred EOB with Max A x 2 people. Min A for upright balance using RUE for support. [place] collar applied.  Unable to lift LUE/ minimal  B hands. Sat ~ 12 min . Sit > supine with A x 2. Scoot HOB , total A.      Rehab Prognosis: Fair; patient would benefit from acute skilled PT services to address these deficits and reach maximum level of function.    Recent Surgery: * No surgery found *      Plan:     During this hospitalization, patient to be seen 6 x/week to address the identified rehab impairments via therapeutic activities, therapeutic exercises, neuromuscular re-education and progress toward the following goals:    Plan of Care Expires:  04/15/23    Subjective     Chief Complaint: neck pain , reports discomfort with Aspen collar.  Patient/Family Comments/goals: none stated  Pain/Comfort:  Pain Rating 1: other (see comments) (did not rate)  Location - Orientation 1: posterior  Location 1: neck (and shoulders)  Pain Addressed 1: Reposition, Nurse notified      Objective:     Communicated  with nurse Grace prior to session.  Patient found supine with bed alarm, telemetry, pressure relief boots ([employer] at bedside) upon PT entry to room.     General Precautions: Standard, aspiration, fall  Orthopedic Precautions: spinal precautions  Braces: [place] collar  Respiratory Status: Room air     Functional Mobility:  Bed Mobility:     Rolling Left:  maximal assistance  Rolling Right: maximal assistance  Supine to Sit: maximal assistance and of 2 persons  Sit to Supine: maximal assistance and of 2 persons      AM-PAC 6 CLICK MOBILITY          Treatment & Education:  BLE exercises; SLR's, HS, Hip abd/add.  Transferred sup <>sit with A x 2 . Min A for upright.      Patient left supine with all lines intact, call button in reach, bed alarm on, nurse Sanjay notified, and [employer] present..    GOALS:   Multidisciplinary Problems       Physical Therapy Goals          Problem: Physical Therapy    Goal Priority Disciplines Outcome Goal Variances Interventions   Physical Therapy Goal     PT, PT/OT Ongoing, Progressing     Description: Goals to be met by: 4/15/2023     Patient will increase functional independence with mobility by performin). Supine to sit with MInimal Assistance  2). Sit to supine with MInimal Assistance  3). Bed to chair transfer with Moderate Assistance   4). Sitting at edge of bed x> 10 minutes with Contact Guard Assistance                         Time Tracking:     PT Received On: 23  PT Start Time: 1323     PT Stop Time: 1346  PT Total Time (min): 23 min     Billable Minutes: Therapeutic Activity 15 min and Therapeutic Exercise 8 min    Treatment Type: Treatment  PT/PTA: PTA     Number of PTA visits since last PT visit: 2     2023

## 2023-04-06 NOTE — PLAN OF CARE
Per Lizzeth with PH[facility], LTAC auth was denied. SNF auth still good #[id number]    AVS, brad summary and updated 3 day packet sent to [facility] via [employer] for review     04/06/23 0962   Post-Acute Status   Post-Acute Authorization Placement   Post-Acute Placement Status Pending post-acute provider review/more information requested

## 2023-04-06 NOTE — PLAN OF CARE
Updated AVS with tramadol and coreg d/c'd and speech rec sent to [facility] via [facility]

## 2023-04-06 NOTE — PLAN OF CARE
Problem: SLP  Goal: SLP Goal  Description: 1) Pt will undergo MBSS to objectively assess swallow physiology and determine presence/severity of dysphagia.--MET  2) Complete CTAR, Shaker, Mendelsohn, effortful swallow and SGS  with MIN verbal and visual cues to improve pharyngeal strength for the support of safe PO intake.  3) The patient with demonstrate use of aspiration precautions during meal trials with MIN verbal/visual cues     Outcome: Ongoing, Progressing    MBSS DONE. Moderate oropharyngeal dysphagia. REC Mech soft (IDDSI 5) with thin liquids. No mixed consistencies, no fresh/canned fruits x bananas, no bread. Meds crushed. Strict aspiration precautions. Diet changed. ST follow up at next level of care

## 2023-04-06 NOTE — CARE UPDATE
04/05/23 1930   Patient Assessment/Suction   Level of Consciousness (AVPU) alert   PRE-TX-O2   Device (Oxygen Therapy) room air   SpO2 (!) 94 %   Pulse Oximetry Type Intermittent

## 2023-04-06 NOTE — NURSING
Wound care completed as orders. Patient tolerated without complaint. Patient repositioned to his L side        R arm     L arm     R anterior leg     R anterior foot     R posterior foot     L anterior leg     Face     Unstageable PI to sacrum

## 2023-04-06 NOTE — PLAN OF CARE
Report can be called to [phone]. Pt going to room A26. [place] to secure transportation.    Pt clear for discharge from CM standpoint. Discharging to [employer]       04/06/23 1425   Final Note   Assessment Type Final Discharge Note   Anticipated Discharge Disposition SNF

## 2023-04-06 NOTE — NURSING
Dynamap BP 66/44, rechecked manually 84/50, pt lying in bed,alert, skin W&D, denies pain, dizziness, states feeling little weak, Dr Gamino on unit & notified. Now assessing pt, Coreg dc'd. Will continue to monitor & recheck BP soon.

## 2023-04-06 NOTE — ASSESSMENT & PLAN NOTE
Patient's FSGs are controlled on current medication regimen.  Last A1c reviewed-   Lab Results   Component Value Date    LABA1C 6.6 (H) 08/08/2016    HGBA1C 5.1 01/06/2022     Most recent fingerstick glucose reviewed-   Recent Labs   Lab 04/05/23  1053 04/05/23  1648 04/05/23 2037   POCTGLUCOSE 136* 103 97     Current correctional scale  Low  Maintain anti-hyperglycemic dose as follows-   Antihyperglycemics (From admission, onward)    Start     Stop Route Frequency Ordered    03/31/23 1814  insulin aspart U-100 pen 0-5 Units         -- SubQ Before meals & nightly PRN 03/31/23 1814        Hold Oral hypoglycemics while patient is in the hospital.

## 2023-04-06 NOTE — PLAN OF CARE
Problem: Physical Therapy  Goal: Physical Therapy Goal  Description: Goals to be met by: 4/15/2023     Patient will increase functional independence with mobility by performin). Supine to sit with MInimal Assistance  2). Sit to supine with MInimal Assistance  3). Bed to chair transfer with Moderate Assistance   4). Sitting at edge of bed x> 10 minutes with Contact Guard Assistance    Outcome: Ongoing, Progressing   Therapeutic activity: bed mobility , transfers EOB, LE exercises.

## 2023-04-06 NOTE — SUBJECTIVE & OBJECTIVE
"Interval History: see "Hospital Course"    Review of Systems   Skin:  Positive for color change and wound.   Allergic/Immunologic: Positive for immunocompromised state.   Objective:     Vital Signs (Most Recent):  Temp: 96.8 °F (36 °C) (04/06/23 0301)  Pulse: 72 (04/06/23 0752)  Resp: 18 (04/06/23 0752)  BP: 123/83 (04/06/23 0752)  SpO2: 97 % (04/06/23 0752) Vital Signs (24h Range):  Temp:  [96.1 °F (35.6 °C)-97 °F (36.1 °C)] 96.8 °F (36 °C)  Pulse:  [61-72] 72  Resp:  [16-19] 18  SpO2:  [94 %-97 %] 97 %  BP: (103-132)/(57-83) 123/83     Weight: 104 kg (229 lb 4.5 oz)  Body mass index is 37.01 kg/m².    Intake/Output Summary (Last 24 hours) at 4/6/2023 0850  Last data filed at 4/6/2023 0754  Gross per 24 hour   Intake 895 ml   Output 0 ml   Net 895 ml      Physical Exam  Vitals and nursing note reviewed.   Constitutional:       General: He is not in acute distress.  HENT:      Head: Normocephalic.      Right Ear: External ear normal.      Left Ear: External ear normal.      Nose: Nose normal.      Mouth/Throat:      Mouth: Mucous membranes are moist.      Pharynx: Oropharynx is clear.   Eyes:      Extraocular Movements: Extraocular movements intact.      Conjunctiva/sclera: Conjunctivae normal.   Cardiovascular:      Rate and Rhythm: Normal rate and regular rhythm.      Pulses: Normal pulses.      Heart sounds: Normal heart sounds.   Pulmonary:      Effort: Pulmonary effort is normal.      Breath sounds: Normal breath sounds.   Abdominal:      General: Bowel sounds are normal.      Palpations: Abdomen is soft.   Musculoskeletal:         General: Normal range of motion.      Cervical back: Normal range of motion and neck supple.      Right lower leg: No edema.      Left lower leg: No edema.   Skin:     Findings: Bruising present.   Neurological:      Mental Status: He is alert and oriented to person, place, and time. Mental status is at baseline.   Psychiatric:         Mood and Affect: Mood normal.         Behavior: " Behavior normal.       Significant Labs: All pertinent labs within the past 24 hours have been reviewed.    Significant Imaging: I have reviewed all pertinent imaging results/findings within the past 24 hours.

## 2023-04-10 ENCOUNTER — HOSPITAL ENCOUNTER (OUTPATIENT)
Dept: RADIOLOGY | Facility: HOSPITAL | Age: 78
Discharge: HOME | End: 2023-04-10
Attending: NEUROLOGICAL SURGERY
Payer: MEDICARE

## 2023-04-10 ENCOUNTER — OFFICE VISIT (OUTPATIENT)
Dept: NEUROSURGERY | Facility: CLINIC | Age: 78
End: 2023-04-10
Payer: MEDICARE

## 2023-04-10 DIAGNOSIS — Z98.1 S/P CERVICAL SPINAL FUSION: Primary | ICD-10-CM

## 2023-04-10 DIAGNOSIS — M51.36 DDD (DEGENERATIVE DISC DISEASE), LUMBAR: ICD-10-CM

## 2023-04-10 DIAGNOSIS — S14.129D CENTRAL CORD SYNDROME, SUBSEQUENT ENCOUNTER: Primary | ICD-10-CM

## 2023-04-10 DIAGNOSIS — Z98.1 STATUS POST CERVICAL SPINAL FUSION: ICD-10-CM

## 2023-04-10 DIAGNOSIS — M54.2 NECK PAIN: ICD-10-CM

## 2023-04-10 DIAGNOSIS — M48.062 LUMBAR STENOSIS WITH NEUROGENIC CLAUDICATION: ICD-10-CM

## 2023-04-10 PROCEDURE — 99024 PR POST-OP FOLLOW-UP VISIT: ICD-10-PCS | Mod: S$GLB,,, | Performed by: NEUROLOGICAL SURGERY

## 2023-04-10 PROCEDURE — 72040 XR CERVICAL SPINE AP LATERAL: ICD-10-PCS | Mod: 26,,, | Performed by: RADIOLOGY

## 2023-04-10 PROCEDURE — 72040 X-RAY EXAM NECK SPINE 2-3 VW: CPT | Mod: TC,FY

## 2023-04-10 PROCEDURE — 99024 POSTOP FOLLOW-UP VISIT: CPT | Mod: S$GLB,,, | Performed by: NEUROLOGICAL SURGERY

## 2023-04-10 PROCEDURE — 72040 X-RAY EXAM NECK SPINE 2-3 VW: CPT | Mod: 26,,, | Performed by: RADIOLOGY

## 2023-04-10 NOTE — PROGRESS NOTES
Richard is status post posterior cervical decompression instrumented fusion C3-T2 performed uneventfully on March 24, 2023.  He has multiple medical comorbidities.  He presented to the hospital after a face forward mechanical fall while on multiple anticoagulants.  MRI cervical spine demonstrated multilevel cervical spondylolisthesis as well as cord compression with prominent intrinsic cord signal change.  His clinical exam was consistent with central cord syndrome.  Confounding his presentation, he has a history of multifocal severe lumbar stenosis with antecedent leg weakness and gait instability.  Once stabilized postoperatively he transitioned to inpatient rehab where he currently resides.  He is accompanied by family today who report worsening of his baseline mild dementia.  They note fluctuations with short-term memory.  Postoperatively he endorses improved right upper extremity strength and bilateral  strength.  He has ongoing left arm weakness.  He has ongoing bilateral leg weakness.  They report no incision related issues.  No recent fevers chills malaise.  He has been restarted on Coumadin for AFib.    No recent cervical spine imaging available     On exam, he is in no distress, pleasant, and wheelchair.  He is awake, alert, oriented to person, place, mildly confused.  Diffuse fascial ecchymosis is still present.  Pupils are equal, reactive.  Face symmetric.  Right deltoid 3/5, right biceps 3/5, right triceps 3/5, right  4/5.  Left deltoid 1/5, left biceps, triceps,  1/5.  Bilateral iliopsoas 2/5, bilateral EHL/TA 2/5.  Sensation grossly intact.  Posterior cervical incision is well healed without sign of infection.  Staples removed.      Analysis:  I ordered updated cervical spine x-ray for re-evaluation of the spinal implants.  I recommend continued inpatient rehabilitation.  I recommend deferring lumbar surgery for at least 6 weeks.  We provided him with a new cervical orthosis which fits much  better.  He should wear the collar when being mobilized and while sleeping.  The collar may be removed at rest.  We will see him back in 6 weeks for re-evaluation.  Diagnoses and all orders for this visit:    Central cord syndrome, subsequent encounter    Status post cervical spinal fusion    Lumbar stenosis with neurogenic claudication    DDD (degenerative disc disease), lumbar

## 2023-04-11 NOTE — PROGRESS NOTES
Subjective:       Patient ID: Richard Bustos is a 73 y.o. male.    Chief Complaint: Follow-up (3 month )    Patient with controlled type 2 diabetes, chronic kidney disease stage 3, polyneuropathy, chronic back pain with chronic continuous opioid dependence, chronic anxiety with chronic continuous benzodiazepine dependence presents for routine 3 month follow-up.  Patient states that he takes hydrocodone 3 times daily regularly with control of his pain. He is currently taking Lexapro 20 mg daily and Xanax in the evening time.  He feels that his anxiety is currently not controlled.  He is occasionally taking a Xanax during the daytime to help with symptoms of anxiety.  He is requesting increased number of Xanax prescribed.  We discussed adding BuSpar to take in the morning and at noon to help with daytime anxiety and he can continue taking his Xanax in the evening time.  He is agreeable to try this.    Patients patient medical/surgical, social and family histories have been reviewed       Review of Systems   Constitutional: Negative for activity change, appetite change, fatigue and unexpected weight change.   Respiratory: Negative for cough, chest tightness and shortness of breath.    Cardiovascular: Negative for chest pain, palpitations and leg swelling.   Gastrointestinal: Negative for abdominal pain, anal bleeding, blood in stool, constipation, diarrhea and nausea.   Endocrine: Negative for polydipsia and polyuria.   Genitourinary: Negative for difficulty urinating, frequency, hematuria and urgency.   Musculoskeletal: Positive for arthralgias, back pain and gait problem.   Skin: Negative for rash.   Neurological: Negative for dizziness and headaches.   Psychiatric/Behavioral: Positive for sleep disturbance. Negative for agitation, behavioral problems, confusion, decreased concentration, dysphoric mood, self-injury and suicidal ideas. The patient is nervous/anxious. The patient is not hyperactive.        Objective:       Physical Exam    Physical Exam   Constitutional: He is cooperative. He does not appear ill. No distress.   Obese body habitus      Cardiovascular: Normal rate, regular rhythm and normal heart sounds.   Pulmonary/Chest: Effort normal and breath sounds normal.   Musculoskeletal:        Right hip: He exhibits decreased range of motion and decreased strength.        Left hip: He exhibits decreased range of motion and decreased strength.        Lumbar back: He exhibits decreased range of motion. He exhibits no tenderness.   Neurological: He is alert.   Psychiatric: He has a normal mood and affect. His speech is normal and behavior is normal. Judgment and thought content normal. Cognition and memory are normal.   Vitals reviewed.  Assessment:       1. Chronic, continuous use of opioids    2. Chronically on benzodiazepine therapy    3. Type 2 diabetes mellitus with stage 3 chronic kidney disease, without long-term current use of insulin    4. Hypertension associated with diabetes    5. Pure hypercholesterolemia    6. Uncomplicated opioid dependence    7. Cardiomyopathy, unspecified type    8. Atherosclerosis of native coronary artery with angina pectoris, unspecified whether native or transplanted heart    9. GARY (generalized anxiety disorder)        Plan:       Richard was seen today for follow-up.    Diagnoses and all orders for this visit:    Chronic, continuous use of opioids    Chronically on benzodiazepine therapy  Controlled substance contract is updated today and will be given to PCP to sign and scanned into chart.  Urine toxicology test was last done October 2018.   was reviewed and without evidence of divergence.    Type 2 diabetes mellitus with stage 3 chronic kidney disease, without long-term current use of insulin  -     Hemoglobin A1c; Future  -     Comprehensive metabolic panel; Future    Hypertension associated with diabetes  Continue current medications  Pure hypercholesterolemia  -     Lipid panel;  Future    Uncomplicated opioid dependence  Refill request we sent to PCP  Cardiomyopathy, unspecified type  Continue per Cardiology  Atherosclerosis of native coronary artery with angina pectoris, unspecified whether native or transplanted heart  Continue per Cardiology  Generalized anxiety disorder  -trial of     busPIRone (BUSPAR) 7.5 MG tablet; Take every morning and noon  Continue Lexapro and alprazolam at bedtime    Patient will be seen by PCP in 3 months he will have labs prior     Eucrisa Counseling: Patient may experience a mild burning sensation during topical application. Eucrisa is not approved in children less than 2 years of age.

## 2023-04-12 ENCOUNTER — PATIENT MESSAGE (OUTPATIENT)
Dept: ADMINISTRATIVE | Facility: HOSPITAL | Age: 78
End: 2023-04-12
Payer: MEDICARE

## 2023-04-18 ENCOUNTER — HOSPITAL ENCOUNTER (OUTPATIENT)
Facility: HOSPITAL | Age: 78
End: 2023-04-19
Attending: EMERGENCY MEDICINE | Admitting: STUDENT IN AN ORGANIZED HEALTH CARE EDUCATION/TRAINING PROGRAM
Payer: MEDICARE

## 2023-04-18 VITALS
HEART RATE: 62 BPM | RESPIRATION RATE: 18 BRPM | WEIGHT: 230 LBS | TEMPERATURE: 99 F | OXYGEN SATURATION: 100 % | DIASTOLIC BLOOD PRESSURE: 57 MMHG | SYSTOLIC BLOOD PRESSURE: 119 MMHG | BODY MASS INDEX: 37.12 KG/M2

## 2023-04-18 DIAGNOSIS — N17.9 AKI (ACUTE KIDNEY INJURY): ICD-10-CM

## 2023-04-18 DIAGNOSIS — A41.9 SEPSIS: ICD-10-CM

## 2023-04-18 DIAGNOSIS — L89.159 PRESSURE INJURY OF SKIN OF SACRAL REGION, UNSPECIFIED INJURY STAGE: Primary | ICD-10-CM

## 2023-04-18 DIAGNOSIS — T45.514A POISONING BY WARFARIN SODIUM, UNDETERMINED INTENT, INITIAL ENCOUNTER: ICD-10-CM

## 2023-04-18 DIAGNOSIS — I95.9 HYPOTENSION: ICD-10-CM

## 2023-04-18 DIAGNOSIS — R41.82 ALTERED MENTAL STATE: ICD-10-CM

## 2023-04-18 LAB
ALBUMIN SERPL BCP-MCNC: 1.9 G/DL (ref 3.5–5.2)
ALP SERPL-CCNC: 131 U/L (ref 55–135)
ALT SERPL W/O P-5'-P-CCNC: 53 U/L (ref 10–44)
AMPHET+METHAMPHET UR QL: NEGATIVE
ANION GAP SERPL CALC-SCNC: 9 MMOL/L (ref 8–16)
AST SERPL-CCNC: 65 U/L (ref 10–40)
BARBITURATES UR QL SCN>200 NG/ML: NEGATIVE
BASOPHILS NFR BLD: 1 % (ref 0–1.9)
BENZODIAZ UR QL SCN>200 NG/ML: NEGATIVE
BILIRUB SERPL-MCNC: 0.8 MG/DL (ref 0.1–1)
BILIRUB UR QL STRIP: NEGATIVE
BUN SERPL-MCNC: 63 MG/DL (ref 8–23)
BZE UR QL SCN: NEGATIVE
CALCIUM SERPL-MCNC: 8.8 MG/DL (ref 8.7–10.5)
CANNABINOIDS UR QL SCN: NEGATIVE
CHLORIDE SERPL-SCNC: 108 MMOL/L (ref 95–110)
CLARITY UR: CLEAR
CO2 SERPL-SCNC: 21 MMOL/L (ref 23–29)
COLOR UR: YELLOW
CREAT SERPL-MCNC: 2.5 MG/DL (ref 0.5–1.4)
CREAT UR-MCNC: 85.4 MG/DL (ref 23–375)
CRP SERPL-MCNC: 224.4 MG/L (ref 0–8.2)
DIFFERENTIAL METHOD: ABNORMAL
EOSINOPHIL NFR BLD: 0 % (ref 0–8)
ERYTHROCYTE [DISTWIDTH] IN BLOOD BY AUTOMATED COUNT: 14.9 % (ref 11.5–14.5)
EST. GFR  (NO RACE VARIABLE): 26 ML/MIN/1.73 M^2
GLUCOSE SERPL-MCNC: 133 MG/DL (ref 70–110)
GLUCOSE UR QL STRIP: NEGATIVE
HCT VFR BLD AUTO: 27.4 % (ref 40–54)
HGB BLD-MCNC: 9.1 G/DL (ref 14–18)
HGB UR QL STRIP: NEGATIVE
IMM GRANULOCYTES # BLD AUTO: ABNORMAL K/UL
IMM GRANULOCYTES NFR BLD AUTO: ABNORMAL %
INR PPP: 7.8 (ref 0.8–1.2)
KETONES UR QL STRIP: NEGATIVE
LACTATE SERPL-SCNC: 1.9 MMOL/L (ref 0.5–2.2)
LEUKOCYTE ESTERASE UR QL STRIP: NEGATIVE
LYMPHOCYTES NFR BLD: 8 % (ref 18–48)
MCH RBC QN AUTO: 33.7 PG (ref 27–31)
MCHC RBC AUTO-ENTMCNC: 33.2 G/DL (ref 32–36)
MCV RBC AUTO: 102 FL (ref 82–98)
METHADONE UR QL SCN>300 NG/ML: NEGATIVE
MONOCYTES NFR BLD: 3 % (ref 4–15)
NEUTROPHILS NFR BLD: 84 % (ref 38–73)
NEUTS BAND NFR BLD MANUAL: 4 %
NITRITE UR QL STRIP: NEGATIVE
NRBC BLD-RTO: 0 /100 WBC
OPIATES UR QL SCN: NEGATIVE
PCP UR QL SCN>25 NG/ML: NEGATIVE
PH UR STRIP: 6 [PH] (ref 5–8)
PLATELET # BLD AUTO: 306 K/UL (ref 150–450)
PLATELET BLD QL SMEAR: ABNORMAL
PMV BLD AUTO: 10.5 FL (ref 9.2–12.9)
POTASSIUM SERPL-SCNC: 4.5 MMOL/L (ref 3.5–5.1)
PROT SERPL-MCNC: 5.7 G/DL (ref 6–8.4)
PROT UR QL STRIP: NEGATIVE
PROTHROMBIN TIME: 73.9 SEC (ref 9–12.5)
RBC # BLD AUTO: 2.7 M/UL (ref 4.6–6.2)
SODIUM SERPL-SCNC: 138 MMOL/L (ref 136–145)
SP GR UR STRIP: 1.01 (ref 1–1.03)
TOXICOLOGY INFORMATION: NORMAL
TROPONIN I SERPL DL<=0.01 NG/ML-MCNC: 0.1 NG/ML (ref 0–0.03)
URN SPEC COLLECT METH UR: NORMAL
UROBILINOGEN UR STRIP-ACNC: NEGATIVE EU/DL
WBC # BLD AUTO: 13.09 K/UL (ref 3.9–12.7)

## 2023-04-18 PROCEDURE — 36415 COLL VENOUS BLD VENIPUNCTURE: CPT

## 2023-04-18 PROCEDURE — 80307 DRUG TEST PRSMV CHEM ANLYZR: CPT | Performed by: EMERGENCY MEDICINE

## 2023-04-18 PROCEDURE — 85007 BL SMEAR W/DIFF WBC COUNT: CPT | Performed by: EMERGENCY MEDICINE

## 2023-04-18 PROCEDURE — 36415 COLL VENOUS BLD VENIPUNCTURE: CPT | Performed by: EMERGENCY MEDICINE

## 2023-04-18 PROCEDURE — 96361 HYDRATE IV INFUSION ADD-ON: CPT

## 2023-04-18 PROCEDURE — 93010 ELECTROCARDIOGRAM REPORT: CPT | Mod: ,,, | Performed by: SPECIALIST

## 2023-04-18 PROCEDURE — 51702 INSERT TEMP BLADDER CATH: CPT | Mod: 59

## 2023-04-18 PROCEDURE — 83605 ASSAY OF LACTIC ACID: CPT | Performed by: EMERGENCY MEDICINE

## 2023-04-18 PROCEDURE — 63600175 PHARM REV CODE 636 W HCPCS: Performed by: EMERGENCY MEDICINE

## 2023-04-18 PROCEDURE — 63600175 PHARM REV CODE 636 W HCPCS

## 2023-04-18 PROCEDURE — G0378 HOSPITAL OBSERVATION PER HR: HCPCS

## 2023-04-18 PROCEDURE — 99900035 HC TECH TIME PER 15 MIN (STAT)

## 2023-04-18 PROCEDURE — 93010 EKG 12-LEAD: ICD-10-PCS | Mod: ,,, | Performed by: SPECIALIST

## 2023-04-18 PROCEDURE — 86140 C-REACTIVE PROTEIN: CPT | Performed by: EMERGENCY MEDICINE

## 2023-04-18 PROCEDURE — 80053 COMPREHEN METABOLIC PANEL: CPT | Performed by: EMERGENCY MEDICINE

## 2023-04-18 PROCEDURE — 27000221 HC OXYGEN, UP TO 24 HOURS

## 2023-04-18 PROCEDURE — 25000003 PHARM REV CODE 250: Performed by: EMERGENCY MEDICINE

## 2023-04-18 PROCEDURE — 96368 THER/DIAG CONCURRENT INF: CPT

## 2023-04-18 PROCEDURE — 87154 CUL TYP ID BLD PTHGN 6+ TRGT: CPT

## 2023-04-18 PROCEDURE — 85027 COMPLETE CBC AUTOMATED: CPT | Performed by: EMERGENCY MEDICINE

## 2023-04-18 PROCEDURE — 99291 CRITICAL CARE FIRST HOUR: CPT

## 2023-04-18 PROCEDURE — 96375 TX/PRO/DX INJ NEW DRUG ADDON: CPT

## 2023-04-18 PROCEDURE — 93005 ELECTROCARDIOGRAM TRACING: CPT

## 2023-04-18 PROCEDURE — 87040 BLOOD CULTURE FOR BACTERIA: CPT | Mod: 59

## 2023-04-18 PROCEDURE — 94761 N-INVAS EAR/PLS OXIMETRY MLT: CPT

## 2023-04-18 PROCEDURE — 85610 PROTHROMBIN TIME: CPT | Performed by: EMERGENCY MEDICINE

## 2023-04-18 PROCEDURE — 81003 URINALYSIS AUTO W/O SCOPE: CPT | Mod: 59 | Performed by: EMERGENCY MEDICINE

## 2023-04-18 PROCEDURE — 96366 THER/PROPH/DIAG IV INF ADDON: CPT

## 2023-04-18 PROCEDURE — 96365 THER/PROPH/DIAG IV INF INIT: CPT | Mod: 59

## 2023-04-18 PROCEDURE — 36620 INSERTION CATHETER ARTERY: CPT

## 2023-04-18 PROCEDURE — 84484 ASSAY OF TROPONIN QUANT: CPT | Performed by: EMERGENCY MEDICINE

## 2023-04-18 RX ORDER — IBUPROFEN 200 MG
16 TABLET ORAL
Status: CANCELLED | OUTPATIENT
Start: 2023-04-18

## 2023-04-18 RX ORDER — MORPHINE SULFATE 4 MG/ML
4 INJECTION, SOLUTION INTRAMUSCULAR; INTRAVENOUS
Status: DISCONTINUED | OUTPATIENT
Start: 2023-04-18 | End: 2023-04-18

## 2023-04-18 RX ORDER — CEFEPIME HYDROCHLORIDE 1 G/50ML
1 INJECTION, SOLUTION INTRAVENOUS
Status: CANCELLED | OUTPATIENT
Start: 2023-04-19

## 2023-04-18 RX ORDER — GLUCAGON 1 MG
1 KIT INJECTION
Status: CANCELLED | OUTPATIENT
Start: 2023-04-18

## 2023-04-18 RX ORDER — TALC
8 POWDER (GRAM) TOPICAL NIGHTLY PRN
Status: CANCELLED | OUTPATIENT
Start: 2023-04-18

## 2023-04-18 RX ORDER — SODIUM CHLORIDE 0.9 % (FLUSH) 0.9 %
3 SYRINGE (ML) INJECTION
Status: CANCELLED | OUTPATIENT
Start: 2023-04-18

## 2023-04-18 RX ORDER — LANOLIN ALCOHOL/MO/W.PET/CERES
800 CREAM (GRAM) TOPICAL
Status: CANCELLED | OUTPATIENT
Start: 2023-04-18

## 2023-04-18 RX ORDER — DOXYCYCLINE HYCLATE 100 MG
100 TABLET ORAL 2 TIMES DAILY
Status: ON HOLD | COMMUNITY
Start: 2023-04-17 | End: 2023-04-28 | Stop reason: HOSPADM

## 2023-04-18 RX ORDER — INSULIN ASPART 100 [IU]/ML
1-10 INJECTION, SOLUTION INTRAVENOUS; SUBCUTANEOUS
Status: CANCELLED | OUTPATIENT
Start: 2023-04-18

## 2023-04-18 RX ORDER — NALOXONE HCL 0.4 MG/ML
0.02 VIAL (ML) INJECTION
Status: CANCELLED | OUTPATIENT
Start: 2023-04-18

## 2023-04-18 RX ORDER — ACETAMINOPHEN 325 MG/1
650 TABLET ORAL EVERY 4 HOURS PRN
Status: CANCELLED | OUTPATIENT
Start: 2023-04-18

## 2023-04-18 RX ORDER — IBUPROFEN 200 MG
24 TABLET ORAL
Status: CANCELLED | OUTPATIENT
Start: 2023-04-18

## 2023-04-18 RX ORDER — MORPHINE SULFATE 2 MG/ML
2 INJECTION, SOLUTION INTRAMUSCULAR; INTRAVENOUS
Status: COMPLETED | OUTPATIENT
Start: 2023-04-18 | End: 2023-04-18

## 2023-04-18 RX ORDER — ACETAMINOPHEN 325 MG/1
650 TABLET ORAL EVERY 6 HOURS PRN
Status: CANCELLED | OUTPATIENT
Start: 2023-04-18

## 2023-04-18 RX ORDER — NOREPINEPHRINE BITARTRATE/D5W 4MG/250ML
0-3 PLASTIC BAG, INJECTION (ML) INTRAVENOUS CONTINUOUS
Status: DISCONTINUED | OUTPATIENT
Start: 2023-04-18 | End: 2023-04-19 | Stop reason: HOSPADM

## 2023-04-18 RX ORDER — AMOXICILLIN 250 MG
1 CAPSULE ORAL 2 TIMES DAILY
Status: CANCELLED | OUTPATIENT
Start: 2023-04-18

## 2023-04-18 RX ORDER — ONDANSETRON 2 MG/ML
4 INJECTION INTRAMUSCULAR; INTRAVENOUS EVERY 6 HOURS PRN
Status: CANCELLED | OUTPATIENT
Start: 2023-04-18

## 2023-04-18 RX ADMIN — SODIUM CHLORIDE 1000 ML: 9 INJECTION, SOLUTION INTRAVENOUS at 01:04

## 2023-04-18 RX ADMIN — MORPHINE SULFATE 2 MG: 2 INJECTION, SOLUTION INTRAMUSCULAR; INTRAVENOUS at 04:04

## 2023-04-18 RX ADMIN — Medication 0.35 MCG/KG/MIN: at 11:04

## 2023-04-18 RX ADMIN — Medication 0.02 MCG/KG/MIN: at 07:04

## 2023-04-18 RX ADMIN — Medication 0.35 MCG/KG/MIN: at 09:04

## 2023-04-18 RX ADMIN — VANCOMYCIN HYDROCHLORIDE 1250 MG: 1.25 INJECTION, POWDER, LYOPHILIZED, FOR SOLUTION INTRAVENOUS at 03:04

## 2023-04-18 RX ADMIN — CEFEPIME 2 G: 2 INJECTION, POWDER, FOR SOLUTION INTRAVENOUS at 04:04

## 2023-04-18 NOTE — ED NOTES
Pt BIB EMS from [facility] for AMS.  Pt A/Ox4, mumbling words and intermittent incomprehensible speaking.  Pt has C-collar in place from previous spinal fusion Sx 4-10. Pt has diffuse bruising and skin tears, large non healing unstageable pressure injury to saccrum

## 2023-04-18 NOTE — PROVIDER TRANSFER
Outside Transfer Acceptance Note / Regional Referral Center    Referring facility: [facility]   Referring provider: DOMINICK FARRIS, MANI HAYWARD, PHILIP CANELA  Accepting facility: [facility]  Accepting provider: ALEJANDRO HOROWITZ, ALEXY APODACA, ZAINAB MILTON  Admitting provider: Dr. Walter England  Reason for transfer:  higher level of care  Transfer diagnosis: Septic Shock   Transfer specialty requested: Gastroenterology  Trauma  Cardiology  Critical Care Medicine  Transfer specialty notified: yes  Transfer level: NUMBER 1-5: 2  Bed type requested: ICU  Isolation status: No active isolations   Admission class or status: Emergency  Emergency  IP- Inpatient      Narrative     77 year old male with PMHx of HTN, HLD, CAD s/p stent placement, CHF, MTHFR mutation, atrial fibrillation on coumadin, DMII, dementia and recent cervical fracture with cord edema s/p cervical spine fusion on 3/24/23 with Dr. Kc presents [facility] the ED from SNF due to worsening confusion. Workup revealed large sacral decubitus and pre-renal EMMY, patient received 3L total of IVF however pressures still dropped so central line placed and currently on levophed. INR 7.8. CRP elevated at 224, CTH/CXR unremarkable. CTX and Vancomycin given in the ED. Transfer requested for ICU care for septic shock.     Objective     Vitals: Temp: 98.7 °F (37.1 °C) (04/18/23 1350)  Pulse: 63 (04/18/23 1815)  Resp: 18 (04/18/23 1622)  BP: (!) 77/43 (04/18/23 1815)  SpO2: 97 % (04/18/23 1815)  Recent Labs: All pertinent labs within the past 24 hours have been reviewed.  ABGs: No results for input(s): PH, PCO2, HCO3, POCSATURATED, BE, TOTALHB, COHB, METHB, O2HB, POCFIO2, PO2 in the last 48 hours.  Blood Culture: No results for input(s):  LABBLOO in the last 48 hours.  CBC:   Recent Labs   Lab 04/18/23  1328   WBC 13.09*   HGB 9.1*   HCT 27.4*        CMP:   Recent Labs   Lab 04/18/23  1328      K 4.5      CO2 21*   *   BUN 63*   CREATININE 2.5*   CALCIUM 8.8   PROT 5.7*   ALBUMIN 1.9*   BILITOT 0.8   ALKPHOS 131   AST 65*   ALT 53*   ANIONGAP 9     Cardiac Markers: No results for input(s): CKMB, MYOGLOBIN, BNP, TROPISTAT in the last 48 hours.  Coagulation:   Recent Labs   Lab 04/18/23  1328   INR 7.8*     Lactic Acid:   Recent Labs   Lab 04/18/23  1328   LACTATE 1.9     Magnesium: No results for input(s): MG in the last 48 hours.  POCT Glucose: No results for input(s): POCTGLUCOSE in the last 48 hours.  Troponin:   Recent Labs   Lab 04/18/23  1328   TROPONINI 0.101*     Urine Studies:   Recent Labs   Lab 04/18/23  1335   COLORU Yellow   APPEARANCEUA Clear   PHUR 6.0   SPECGRAV 1.015   PROTEINUA Negative   GLUCUA Negative   KETONESU Negative   BILIRUBINUA Negative   OCCULTUA Negative   NITRITE Negative   UROBILINOGEN Negative   LEUKOCYTESUR Negative     Recent imaging:  CXR, CTHead, Xray Cervical Spine  Airway:     Vent settings:         IV access:        Peripheral IV - Single Lumen 04/18/23 1208 18 G Left Antecubital (Active)   Site Assessment Clean;Dry;Intact;No redness;No swelling;No warmth 04/18/23 1207   Extremity Assessment Distal to IV No warmth;No abnormal discoloration;No redness;No swelling 04/18/23 1207   Line Status Blood return noted;Flushed;Saline locked 04/18/23 1207   Dressing Status Clean;Dry;Intact 04/18/23 1207   Dressing Intervention First dressing 04/18/23 1207     Infusions: levophed  Allergies:   Review of patient's allergies indicates:   Allergen Reactions    Donepezil Other (See Comments)     AMS    Bactroban [mupirocin calcium] Blisters     Causes Blisters    Shellfish containing products Other (See Comments)     Other reaction(s): Gout  OYSTERS      NPO: No    Anticoagulation:   Anticoagulants        None             Instructions      Upon patient arrival to the ICU, please contact Critical Care Medicine on call.

## 2023-04-18 NOTE — Clinical Note
Diagnosis: Sepsis [241971]   Future Attending Provider: DEO MARTINES [[id number]]   Admitting Provider:: DEO MARTINES [[id number]]   Special Needs:: Fall Risk [15]

## 2023-04-18 NOTE — ED PROVIDER NOTES
Encounter Date: 4/18/2023       History     Chief Complaint   Patient presents with    Altered Mental Status     Pt brought to ed via ems with cc of AMS.  Nursing home staff advised ems pt has more AMS today than normal.       HPI Richard Bustos is a 77 yr old male with PMHx significant for  PMHx of HTN, CAD s/p cardiac stents, lumbar DDD with chronic bilat LE weakness, HLD, gout, MI, CHF, arthritis, A-fib, on Coumadin, DM II, dementia, and MTHFR mutation who presents to the ED via EMS from [facility] for increased confusion/altered mental status more today than normal.  Patient has no complaints.  Per chart review pt experienced a fall on 03/17/2023 which resulted in cervical fracture with cord edema and pt underwent cervical spine fusion performed on 03/24/2023 by Dr. Kc. Pt was d/c to skilled Nursing Facility.  History is obtained from nursing and patient as well as chart review.  No family present at bedside.  Review of patient's allergies indicates:   Allergen Reactions    Donepezil Other (See Comments)     AMS    Bactroban [mupirocin calcium] Blisters     Causes Blisters    Shellfish containing products Other (See Comments)     Other reaction(s): Gout  OYSTERS     Past Medical History:   Diagnosis Date    Anemia     Anemia of other chronic disease 09/13/2017    Anemia, chronic renal failure 09/13/2017    Anemia, unspecified 09/13/2017    Anticoagulant long-term use     Aorta aneurysm     Arthritis     Atrial fibrillation     Bacteremia due to Streptococcus 01/08/2022    CAD (coronary artery disease)     CHF (congestive heart failure)     Chronic kidney disease     Clotting disorder 09/13/2017    Colon polyp     Dementia     Diabetes mellitus     not on meds    Diabetes mellitus type II     Diverticulosis     Elevated PSA     Encounter for blood transfusion     Former smoker     General anesthetics causing adverse effect in therapeutic use     TROUBLE COMING OUT OF ANESTHESIA WITH GASTRIC BYPASS    GERD  (gastroesophageal reflux disease)     Gout     Hx of colonic polyps     Hypercoagulable state     Hyperlipidemia     Hypertension     MI, old 2010    MTHFR mutation     Myocardial infarction     9/2010    Osteomyelitis of ankle or foot 03/09/2017    Prostate cancer 06/2016    Sleep apnea     Squamous cell carcinoma 01/23/2018    Left helix, imiquimod    Venous stasis     Chronic     Past Surgical History:   Procedure Laterality Date    ABDOMINAL SURGERY      CARDIAC SURGERY      3 STENTS     CAUDAL EPIDURAL STEROID INJECTION N/A 6/22/2020    Procedure: INJECTION, STEROID, SPINE, EPIDURAL, CAUDAL;  Surgeon: Evangelist Bose MD;  Location: [facility] OR;  Service: Pain Management;  Laterality: N/A;    COLONOSCOPY  02/2011    diverticulosis with diverticula with clot, no active bleeding    COLONOSCOPY N/A 8/24/2016    Procedure: COLONOSCOPY;  Surgeon: Saroj Borjas MD;  Location: [facility] ENDO;  Service: Endoscopy;  Laterality: N/A;    COLONOSCOPY N/A 11/18/2020    Procedure: COLONOSCOPY;  Surgeon: Saroj Borjas MD;  Location: [facility] ENDO;  Service: Endoscopy;  Laterality: N/A;    COLONOSCOPY N/A 10/27/2021    Procedure: COLONOSCOPY;  Surgeon: Saroj Borjas MD;  Location: [facility] ENDO;  Service: Endoscopy;  Laterality: N/A;    EPIDURAL STEROID INJECTION INTO LUMBAR SPINE N/A 6/22/2020    Procedure: Injection-steroid-epidural-lumbar;  Surgeon: Evangelist oBse MD;  Location: [facility] OR;  Service: Pain Management;  Laterality: N/A;  L3 L4 L5 S1    EPIDURAL STEROID INJECTION INTO LUMBAR SPINE N/A 9/21/2020    Procedure: Injection-steroid-epidural-lumbar;  Surgeon: Evangelist Bose MD;  Location: [facility] OR;  Service: Pain Management;  Laterality: N/A;  L5-S1    FUSION, SPINE, CERVICAL N/A 3/24/2023    Procedure: FUSION, SPINE, CERVICAL;  Surgeon: Kike Kc DO;  Location: [facility] OR;  Service: Neurosurgery;  Laterality: N/A;  posterior cervical decompression/fusion C3-T1    GASTRIC BYPASS  2/5/2008    IVC FILTER RETRIEVAL       JOINT REPLACEMENT  1996 and 2001    bi-lat hip replacement/Rt Hip and Lt Hip    RADIOFREQUENCY ABLATION OF LUMBAR MEDIAL BRANCH NERVE AT SINGLE LEVEL Bilateral 1/10/2020    Procedure: Radiofrequency Ablation, Nerve, Spinal, Lumbar, Medial Branch, 1 Level;  Surgeon: Evangelist Bose MD;  Location: [facility] OR;  Service: Pain Management;  Laterality: Bilateral;  L3,L4,L5    RADIOFREQUENCY ABLATION OF LUMBAR MEDIAL BRANCH NERVE AT SINGLE LEVEL Bilateral 11/23/2021    Procedure: Radiofrequency Ablation, Nerve, Spinal, Lumbar, Medial Branch, Bilateral L 3,4,5;  Surgeon: Nile Causey MD;  Location: [facility] OR;  Service: Pain Management;  Laterality: Bilateral;    ROTATOR CUFF REPAIR      Rt shoulder    Stents  8/18/2010    x 3    UPPER GASTROINTESTINAL ENDOSCOPY  02/2011     Family History   Problem Relation Age of Onset    Heart attack Mother     Heart attack Father     Heart attack Brother     Ulcerative colitis Daughter 35    Lupus Daughter     Colon cancer Neg Hx     Colon polyps Neg Hx     Crohn's disease Neg Hx     Melanoma Neg Hx     Psoriasis Neg Hx     Eczema Neg Hx      Social History     Tobacco Use    Smoking status: Former    Smokeless tobacco: Never    Tobacco comments:     45 yrs ago, only smoked 3-4 packs in his entire life as a teenager   Substance Use Topics    Alcohol use: Not Currently     Comment: rare    Drug use: No     Review of Systems   Constitutional:  Negative for fever.   HENT:  Negative for sore throat.    Respiratory:  Negative for shortness of breath.    Cardiovascular:  Negative for chest pain.   Gastrointestinal:  Negative for nausea.   Genitourinary:  Negative for dysuria.   Musculoskeletal:  Negative for back pain.   Skin:  Positive for wound. Negative for rash.   Neurological:  Positive for weakness.   Hematological:  Does not bruise/bleed easily.   Psychiatric/Behavioral:  Positive for confusion.      Physical Exam     Initial Vitals [04/18/23 1204]   BP Pulse Resp Temp SpO2   (!) 80/56 74 17  98.6 °F (37 °C) 97 %      MAP       --         Physical Exam    Nursing note and vitals reviewed.  Constitutional: He appears well-developed and well-nourished.   Elderly and chronically ill-appearing.   HENT:   Head: Normocephalic.   Resolving forehead hematoma and diffuse old bruising over forehead and face   Eyes: EOM are normal. Pupils are equal, round, and reactive to light.   Neck: Neck supple.   Cardiovascular:  Normal rate, regular rhythm and intact distal pulses.           Pulmonary/Chest: Breath sounds normal. No stridor. No respiratory distress. He has no rales.   Abdominal: Abdomen is soft and protuberant. He exhibits no distension. There is no abdominal tenderness.   Musculoskeletal:         General: Normal range of motion.      Cervical back: Neck supple.     Neurological: He is alert. He is disoriented. No cranial nerve deficit. He exhibits abnormal muscle tone. GCS eye subscore is 4. GCS verbal subscore is 4. GCS motor subscore is 6.   5/5 strength in his right upper extremity.  3/5 strength in his left upper extremity and bilateral lower extremities.  Speech is mumbled but patient able to produce clear speech intermittently.   Skin: Skin is warm and dry.   There is unstageable sacral decubitus ulcer with necrosis and foul odor.  There is surrounding erythema.       ED Course   Central Line    Date/Time: 4/18/2023 7:14 PM  Performed by: Jaquan Quinn MD  Authorized by: Jaquan Quinn MD     Location procedure was performed:  [facility] EMERGENCY DEPARTMENT  Indications:  Med administration  Anesthesia:  Local infiltration  Local anesthetic:  Lidocaine 1% without epinephrine  Preparation:  Skin prepped with ChloraPrep  Skin prep agent dried: Skin prep agent completely dried prior to procedure    Sterile barriers: All five maximal sterile barriers used - gloves, gown, cap, mask and large sterile sheet    Hand hygiene: Hand hygiene performed immediately prior to central venous catheter insertion     Location:  Right femoral  Site selection rationale:  C collar in place, unable tovisualize appropriate vasculature on IJ US  Catheter type:  Triple lumen  Catheter size:  8.5 Fr  Catheter Lenght (cm): 15.  Ultrasound guidance: Yes    Vessel Caliber:  Medium   patent  Comprressibility:  Normal  Needle advanced into vessel with real time ultrasound guidance.    Guidewire confirmed in vessel.    Steril sheath on probe.    Sterile gel used.  Manometry: No    Number of attempts:  1  Securement:  Line sutured, chlorhexidine patch, sterile dressing applied and blood return through all ports  Complications: No    Adverse Events:  None  Labs Reviewed   CBC W/ AUTO DIFFERENTIAL - Abnormal; Notable for the following components:       Result Value    WBC 13.09 (*)     RBC 2.70 (*)     Hemoglobin 9.1 (*)     Hematocrit 27.4 (*)      (*)     MCH 33.7 (*)     RDW 14.9 (*)     Gran % 84.0 (*)     Lymph % 8.0 (*)     Mono % 3.0 (*)     All other components within normal limits   COMPREHENSIVE METABOLIC PANEL - Abnormal; Notable for the following components:    CO2 21 (*)     Glucose 133 (*)     BUN 63 (*)     Creatinine 2.5 (*)     Total Protein 5.7 (*)     Albumin 1.9 (*)     AST 65 (*)     ALT 53 (*)     eGFR 26 (*)     All other components within normal limits   PROTIME-INR - Abnormal; Notable for the following components:    Prothrombin Time 73.9 (*)     INR 7.8 (*)     All other components within normal limits    Narrative:     INR critical result(s) called and verbal readback obtained from   Makayla Pérez by [facility] 04/18/2023 13:56   C-REACTIVE PROTEIN - Abnormal; Notable for the following components:    .4 (*)     All other components within normal limits   TROPONIN I - Abnormal; Notable for the following components:    Troponin I 0.101 (*)     All other components within normal limits   CULTURE, BLOOD   CULTURE, BLOOD   URINALYSIS    Narrative:     Specimen Source->Urine   DRUG SCREEN PANEL, URINE EMERGENCY     Narrative:     Specimen Source->Urine   LACTIC ACID, PLASMA     EKG Readings: (Independently Interpreted)   Initial Reading: No STEMI.   Accelerated junctional rhythm has replaced normal sinus rhythm, 77 beats per minute with low-voltage QRS.  Right bundle-branch block.  No STEMI.     Imaging Results              X-Ray Chest AP Portable (Final result)  Result time 04/18/23 18:43:23      Final result by Jonathan Álvarez Jr., MD (04/18/23 18:43:23)                   Impression:      Hypoventilation.  Mild cardiomegaly.      Electronically signed by: Jonathan Álvarez MD  Date:    04/18/2023  Time:    18:43               Narrative:    EXAMINATION:  XR CHEST AP PORTABLE    CLINICAL HISTORY:  Hypotension, unspecified    TECHNIQUE:  Single frontal view of the chest was performed.    COMPARISON:  None    FINDINGS:  There is cardiomegaly.  There is general hypoventilation.  An intrapulmonary mass or infiltrate is not seen.  No pneumothorax is noted.                                       CT Head Without Contrast (Final result)  Result time 04/18/23 13:29:52      Final result by Emanuel Dinh MD (04/18/23 13:29:52)                   Impression:      1. There is no acute abnormality.      Electronically signed by: Emanuel Dinh MD  Date:    04/18/2023  Time:    13:29               Narrative:    EXAMINATION:  CT HEAD WITHOUT CONTRAST    CLINICAL HISTORY:  Mental status change, unknown cause;    TECHNIQUE:  Routine unenhanced axial images were obtained through the head.  Sagittal and coronal reformatted images were created.  The study is reviewed in bone and soft tissue windows.    COMPARISON:  Head CT dated 03/31/2023    FINDINGS:  Intracranial contents: The study is mildly motion degraded.  There is, however, no acute abnormality or definite change in the appearance of the brain compared to the prior study.  There is generalized volume loss and nonspecific white matter change.  There is no intracranial hemorrhage  or mass effect.  The gray-white interface is preserved without obvious acute infarction.  There is no midline shift.  There is a remote lacunar infarction in the right cerebellum.  There is no abnormal extra-axial fluid collection.  The basilar cisterns are open.  Cerebellar tonsils are normal position.  There is marked atherosclerosis in the vessels at the base of the brain.    Extracranial contents, calvarium, soft tissues: A frontal scalp hematoma has slightly decreased in size compared to the prior study at which time it was an acute hematoma.  The calvarium is normal.  There is no fracture.  The included paranasal sinuses and mastoid air cells are grossly clear.  No change in nasal bone fractures.                                       Medications   sodium hypochlorite (DAKIN'S) external solution 0.25% (has no administration in time range)   NORepinephrine 4 mg in dextrose 5% 250 mL infusion (premix) (titrating) (0.35 mcg/kg/min × 104.3 kg Intravenous Rate/Dose Change 4/18/23 2040)   sodium chloride 0.9% bolus 1,000 mL 1,000 mL (0 mLs Intravenous Stopped 4/18/23 1341)   ceFEPIme (MAXIPIME) 2 g in dextrose 5 % in water (D5W) 5 % 50 mL IVPB (MB+) (0 g Intravenous Stopped 4/18/23 1711)   morphine injection 2 mg (2 mg Intravenous Given 4/18/23 1622)     Medical Decision Making:   History:   Old Medical Records: I decided to obtain old medical records.  Initial Assessment:   Patient is a 77-year-old man with multiple medical comorbidities of CHF, CAD with cardiac stents, atrial fibrillation on Coumadin, dementia, lower extremity weakness, status post recent cervical fusion surgery who resides at [facility] presents emergency department for increased confusion from his baseline.  Patient is noted to have a necrotic sacral decubitus ulcer with foul odor.  He had initial hypotension that resolved with IV fluids.  He has dehydration with acute renal insufficiency and elevated BUN of 63.  Creatinine 2.5.  He does not meet  sepsis criteria.  He is started on vancomycin and cefepime.  He also has Coumadin toxicity with an elevated INR of 7.8.  He has no chest pain, no evidence of ST-elevation myocardial infarction on EKG.  Troponin is elevated 0.1 although at his baseline.  Gentle fluids administered to avoid volume overload given CHF.  CT head performed showing no acute abnormality, no evidence of intracranial hemorrhage.  Discussed Hospital Medicine who agrees to admit the patient for further management, IV antibiotic therapy, wound care, fluids and holding Coumadin.  Critical Care Time MDM    The high probability of sudden, clinically significant deterioration in the patient's condition required the highest level of my preparedness to intervene urgently.    The services I provided to this patient were to treat and/or prevent clinically significant deterioration that could result in permanent disability, chronic pain and/or death.     Services included the following: chart data review, reviewing nursing notes and/or old charts, documentation time, consultant collaboration regarding findings and treatment options, medication orders and management, direct patient care, vital sign assessments and ordering, interpreting and reviewing diagnostic studies/lab tests.    Aggregate critical care time was 50 minutes, which includes only time during which I was engaged in work directly related to the patient's care, as described above, whether at the bedside or elsewhere in the Emergency Department.     Jaquan Quinn M.D.                    ED Course as of 04/18/23 2120 Tue Apr 18, 2023 1719 Spoke with Hospital Medicine who states the patient has developed hypotension again and do not feel comfortable admitting him to this facility.  I spoke with family who is at bedside stating that he has been feeling extremely thirsty recently.  They confirmed his neurological exam and that he has had very little use of his left upper extremity and  "bilateral lower extremities.  On reexamination he continues to have clear lung sounds.  I will order another L of fluids and reassess.  Plan to transfer to [facility]. [AS]   2120 Central catheter placed and patient started on Levophed.  Patient is accepted to [facility] [AS]      ED Course User Index  [AS] Jaquan Quinn MD    Sepsis Perfusion Assessment: "I attest a sepsis perfusion exam was performed within 6 hours of sepsis, severe sepsis, or septic shock presentation, following fluid resuscitation."            Clinical Impression:   Final diagnoses:  [R41.82] Altered mental state  [L89.159] Pressure injury of skin of sacral region, unspecified injury stage (Primary)  [N17.9] EMMY (acute kidney injury)  [T45.514A] Poisoning by warfarin sodium, undetermined intent, initial encounter  [A41.9] Sepsis  [I95.9] Hypotension        ED Disposition Condition    Transfer to Another Facility Stable                Jaquan Quinn MD  04/18/23 1612       Jaquan Quinn MD  04/18/23 1932       Jaquan Quinn MD  04/18/23 2120       aJquan Quinn MD  04/18/23 2121    "

## 2023-04-18 NOTE — CONSULTS
Wound care consulted for multiple wounds present on admit. Patient with unstageable very foul smelling wound to the sacrococcygeal area which includes the sacrum and the coccyx as one large very deep wound. The skin is leathery on the outer wounds and gray ashen boggy at the center. Packed this wound with a wet to dry gauze and covered with a foam dressing after attempts to apply tegaderm to seal the lower wound to avoid stool getting into the wound. The posterior scrotum is noted to be red and swollen- Applied Triad to the scrotum. Total loss skin tear to left forearm noted and applied Urgotul over the open wound then covered with a foam dressing. Patient with multiple scabbed areas to bilateral upper and lower extremities which Triad was applied to all these areas as well as to the scratch sites to his abdomen. Bilateral lower leg wounds noted. Right lower leg with open wound to upper shin and stasis ulcers to the right lower shin area with weeping skin noted as well. Patient has 3 skin tears to his right dorsal foot. Right posterior leg with a small area of eschar noted. Right great toe with 2 areas of unstageable wounds as well as an area of unstageable wound to the right 2nd toe. Cleaned the entire right lower leg with wound cleanser and patted dry. Applied Triad over the necrotic unstageable wounds then covered these areas with gauze. Applied Urgotul over the open wound areas including the dorsal foot then covered with ABD pads x3, kerlix then ace wrap. Left lower leg noted with scaly skin with weeping wounds x3 sites. Cleaned left lateral lower leg all wounds with wound cleanser and dried. The left lateral foot noted with unstageable wound- Applied Triad and foam dressing to this site. Applied Triad to the left lower leg then covered with xeroform, abd pads x2, kerlix, ace wrap. Noted bruise to left plantar foot etiology unknown.       Unstageable sacrococcygeal wound measures 8cm x 5cm x 2cm.     Right 2nd  toe    Right medial foot view    Right distal great toe    Right medial great toe    Right dorsal foot site #1    Right dorsal foot site #2    Right dorsal foot site #3    Right upper shin    Right lower shin    Right posterior lower leg    Left lower leg view    Left medial lower leg stasis ulcers    Left lateral unstageable wound    Left medial foot bruise vs deep tissue injury    Left lateral proximal foot DTI    Skin tear left dorsal foot    Left forearm total loss skin tear    See media for other minor scrapes, scabs, bruises, abrasions.

## 2023-04-18 NOTE — ED NOTES
Pt BIB EMS from [facility] for AMS.  Pt A/O to self only,  mumbling words and intermittent incomprehensible speaking.  Pt has diffuse bruising and abrasions to entire body. Multiple skin tears and pressure injuries, large unstageable PI to sacrum, necrosis and malodorous drainage present. Pt incontinent of bowel/bladder.  C-collar in place on arrival.  Pt has limited mobility, unable to help with turning.  Pt VSS.  Skin cool/dry, afebrile.  Bed low/locked, siderails upx2, pt agreeable to plan of care.

## 2023-04-19 ENCOUNTER — TELEPHONE (OUTPATIENT)
Dept: SURGERY | Facility: CLINIC | Age: 78
End: 2023-04-19
Payer: MEDICARE

## 2023-04-19 ENCOUNTER — HOSPITAL ENCOUNTER (INPATIENT)
Facility: HOSPITAL | Age: 78
LOS: 9 days | Discharge: NURSING, REHAB OR LONG-TERM CARE | DRG: 853 | End: 2023-04-28
Attending: FAMILY MEDICINE | Admitting: SURGERY
Payer: MEDICARE

## 2023-04-19 DIAGNOSIS — A49.8 PROTEUS INFECTION: ICD-10-CM

## 2023-04-19 DIAGNOSIS — F03.90 DEMENTIA WITHOUT BEHAVIORAL DISTURBANCE: ICD-10-CM

## 2023-04-19 DIAGNOSIS — L89.154 PRESSURE INJURY OF SACRAL REGION, STAGE 4: Primary | ICD-10-CM

## 2023-04-19 DIAGNOSIS — R65.21 SEPTIC SHOCK: ICD-10-CM

## 2023-04-19 DIAGNOSIS — A41.9 SEPTIC SHOCK: ICD-10-CM

## 2023-04-19 DIAGNOSIS — N17.9 AKI (ACUTE KIDNEY INJURY): Chronic | ICD-10-CM

## 2023-04-19 PROBLEM — R79.1 SUPRATHERAPEUTIC INR: Status: ACTIVE | Noted: 2023-04-19

## 2023-04-19 PROBLEM — L89.150 PRESSURE INJURY OF SACRAL REGION, UNSTAGEABLE: Status: ACTIVE | Noted: 2023-04-19

## 2023-04-19 LAB
ABO + RH BLD: NORMAL
ALBUMIN SERPL BCP-MCNC: 1.7 G/DL (ref 3.5–5.2)
ALBUMIN SERPL BCP-MCNC: 1.9 G/DL (ref 3.5–5.2)
ALBUMIN SERPL BCP-MCNC: NORMAL G/DL (ref 3.5–5.2)
ALP SERPL-CCNC: 106 U/L (ref 55–135)
ALP SERPL-CCNC: 97 U/L (ref 55–135)
ALP SERPL-CCNC: NORMAL U/L (ref 55–135)
ALT SERPL W/O P-5'-P-CCNC: 35 U/L (ref 10–44)
ALT SERPL W/O P-5'-P-CCNC: 42 U/L (ref 10–44)
ALT SERPL W/O P-5'-P-CCNC: NORMAL U/L (ref 10–44)
ANION GAP SERPL CALC-SCNC: 13 MMOL/L (ref 8–16)
ANION GAP SERPL CALC-SCNC: 14 MMOL/L (ref 8–16)
ANION GAP SERPL CALC-SCNC: NORMAL MMOL/L (ref 8–16)
AST SERPL-CCNC: 38 U/L (ref 10–40)
AST SERPL-CCNC: 46 U/L (ref 10–40)
AST SERPL-CCNC: NORMAL U/L (ref 10–40)
BASOPHILS # BLD AUTO: 0.02 K/UL (ref 0–0.2)
BASOPHILS # BLD AUTO: 0.03 K/UL (ref 0–0.2)
BASOPHILS NFR BLD: 0.2 % (ref 0–1.9)
BASOPHILS NFR BLD: 0.2 % (ref 0–1.9)
BILIRUB SERPL-MCNC: 0.8 MG/DL (ref 0.1–1)
BILIRUB SERPL-MCNC: 0.9 MG/DL (ref 0.1–1)
BILIRUB SERPL-MCNC: NORMAL MG/DL (ref 0.1–1)
BLD GP AB SCN CELLS X3 SERPL QL: NORMAL
BLD PROD TYP BPU: NORMAL
BLD PROD TYP BPU: NORMAL
BLOOD UNIT EXPIRATION DATE: NORMAL
BLOOD UNIT EXPIRATION DATE: NORMAL
BLOOD UNIT TYPE CODE: 6200
BLOOD UNIT TYPE CODE: 8400
BLOOD UNIT TYPE: NORMAL
BLOOD UNIT TYPE: NORMAL
BUN SERPL-MCNC: 60 MG/DL (ref 8–23)
BUN SERPL-MCNC: 64 MG/DL (ref 8–23)
BUN SERPL-MCNC: NORMAL MG/DL (ref 8–23)
CALCIUM SERPL-MCNC: 8.1 MG/DL (ref 8.7–10.5)
CALCIUM SERPL-MCNC: 8.3 MG/DL (ref 8.7–10.5)
CALCIUM SERPL-MCNC: NORMAL MG/DL (ref 8.7–10.5)
CHLORIDE SERPL-SCNC: 105 MMOL/L (ref 95–110)
CHLORIDE SERPL-SCNC: 106 MMOL/L (ref 95–110)
CHLORIDE SERPL-SCNC: NORMAL MMOL/L (ref 95–110)
CO2 SERPL-SCNC: 20 MMOL/L (ref 23–29)
CO2 SERPL-SCNC: 21 MMOL/L (ref 23–29)
CO2 SERPL-SCNC: NORMAL MMOL/L (ref 23–29)
CODING SYSTEM: NORMAL
CODING SYSTEM: NORMAL
CREAT SERPL-MCNC: 2.2 MG/DL (ref 0.5–1.4)
CREAT SERPL-MCNC: 2.4 MG/DL (ref 0.5–1.4)
CREAT SERPL-MCNC: NORMAL MG/DL (ref 0.5–1.4)
CROSSMATCH INTERPRETATION: NORMAL
CROSSMATCH INTERPRETATION: NORMAL
DIFFERENTIAL METHOD: ABNORMAL
DIFFERENTIAL METHOD: ABNORMAL
DISPENSE STATUS: NORMAL
DISPENSE STATUS: NORMAL
EOSINOPHIL # BLD AUTO: 0 K/UL (ref 0–0.5)
EOSINOPHIL # BLD AUTO: 0.1 K/UL (ref 0–0.5)
EOSINOPHIL NFR BLD: 0.2 % (ref 0–8)
EOSINOPHIL NFR BLD: 0.5 % (ref 0–8)
ERYTHROCYTE [DISTWIDTH] IN BLOOD BY AUTOMATED COUNT: 15 % (ref 11.5–14.5)
ERYTHROCYTE [DISTWIDTH] IN BLOOD BY AUTOMATED COUNT: 15 % (ref 11.5–14.5)
EST. GFR  (NO RACE VARIABLE): 27.5 ML/MIN/1.73 M^2
EST. GFR  (NO RACE VARIABLE): 30.5 ML/MIN/1.73 M^2
EST. GFR  (NO RACE VARIABLE): NORMAL ML/MIN/1.73 M^2
ESTIMATED AVG GLUCOSE: 108 MG/DL (ref 68–131)
GLUCOSE SERPL-MCNC: 151 MG/DL (ref 70–110)
GLUCOSE SERPL-MCNC: 163 MG/DL (ref 70–110)
GLUCOSE SERPL-MCNC: 212 MG/DL (ref 70–110)
GLUCOSE SERPL-MCNC: 215 MG/DL (ref 70–110)
GLUCOSE SERPL-MCNC: NORMAL MG/DL (ref 70–110)
HBA1C MFR BLD: 5.4 % (ref 4.5–6.2)
HCT VFR BLD AUTO: 21.9 % (ref 40–54)
HCT VFR BLD AUTO: 24.6 % (ref 40–54)
HGB BLD-MCNC: 7.2 G/DL (ref 14–18)
HGB BLD-MCNC: 8.1 G/DL (ref 14–18)
IMM GRANULOCYTES # BLD AUTO: 0.14 K/UL (ref 0–0.04)
IMM GRANULOCYTES # BLD AUTO: 0.2 K/UL (ref 0–0.04)
IMM GRANULOCYTES NFR BLD AUTO: 1.1 % (ref 0–0.5)
IMM GRANULOCYTES NFR BLD AUTO: 1.1 % (ref 0–0.5)
INR PPP: 9.4 (ref 0.8–1.2)
INR PPP: >10 (ref 0.8–1.2)
INR PPP: >10 (ref 0.8–1.2)
INR PPP: NORMAL (ref 0.8–1.2)
LACTATE SERPL-SCNC: 0.8 MMOL/L (ref 0.5–1.9)
LACTATE SERPL-SCNC: 1 MMOL/L (ref 0.5–1.9)
LYMPHOCYTES # BLD AUTO: 0.8 K/UL (ref 1–4.8)
LYMPHOCYTES # BLD AUTO: 1.1 K/UL (ref 1–4.8)
LYMPHOCYTES NFR BLD: 6.2 % (ref 18–48)
LYMPHOCYTES NFR BLD: 6.2 % (ref 18–48)
MAGNESIUM SERPL-MCNC: 1.8 MG/DL (ref 1.6–2.6)
MAGNESIUM SERPL-MCNC: 2 MG/DL (ref 1.6–2.6)
MAGNESIUM SERPL-MCNC: NORMAL MG/DL (ref 1.6–2.6)
MCH RBC QN AUTO: 33.2 PG (ref 27–31)
MCH RBC QN AUTO: 33.3 PG (ref 27–31)
MCHC RBC AUTO-ENTMCNC: 32.9 G/DL (ref 32–36)
MCHC RBC AUTO-ENTMCNC: 32.9 G/DL (ref 32–36)
MCV RBC AUTO: 101 FL (ref 82–98)
MCV RBC AUTO: 101 FL (ref 82–98)
MONOCYTES # BLD AUTO: 0.4 K/UL (ref 0.3–1)
MONOCYTES # BLD AUTO: 0.6 K/UL (ref 0.3–1)
MONOCYTES NFR BLD: 2.9 % (ref 4–15)
MONOCYTES NFR BLD: 3.2 % (ref 4–15)
MRSA SCREEN BY PCR: NEGATIVE
NEUTROPHILS # BLD AUTO: 11.7 K/UL (ref 1.8–7.7)
NEUTROPHILS # BLD AUTO: 16.2 K/UL (ref 1.8–7.7)
NEUTROPHILS NFR BLD: 89.1 % (ref 38–73)
NEUTROPHILS NFR BLD: 89.1 % (ref 38–73)
NRBC BLD-RTO: 0 /100 WBC
NRBC BLD-RTO: 0 /100 WBC
NUM UNITS TRANS FFP: NORMAL
NUM UNITS TRANS FFP: NORMAL
PHOSPHATE SERPL-MCNC: 3.6 MG/DL (ref 2.7–4.5)
PHOSPHATE SERPL-MCNC: 4.3 MG/DL (ref 2.7–4.5)
PHOSPHATE SERPL-MCNC: NORMAL MG/DL (ref 2.7–4.5)
PLATELET # BLD AUTO: 297 K/UL (ref 150–450)
PLATELET # BLD AUTO: 345 K/UL (ref 150–450)
PMV BLD AUTO: 10.2 FL (ref 9.2–12.9)
PMV BLD AUTO: 10.4 FL (ref 9.2–12.9)
POTASSIUM SERPL-SCNC: 3.9 MMOL/L (ref 3.5–5.1)
POTASSIUM SERPL-SCNC: 4 MMOL/L (ref 3.5–5.1)
POTASSIUM SERPL-SCNC: NORMAL MMOL/L (ref 3.5–5.1)
PROCALCITONIN SERPL IA-MCNC: 2.49 NG/ML (ref 0–0.5)
PROT SERPL-MCNC: 4.8 G/DL (ref 6–8.4)
PROT SERPL-MCNC: 5.4 G/DL (ref 6–8.4)
PROT SERPL-MCNC: NORMAL G/DL (ref 6–8.4)
PROTHROMBIN TIME: 90.1 SEC (ref 9–12.5)
PROTHROMBIN TIME: 98.9 SEC (ref 9–12.5)
PROTHROMBIN TIME: >100 SEC (ref 9–12.5)
PROTHROMBIN TIME: NORMAL SEC (ref 9–12.5)
RBC # BLD AUTO: 2.16 M/UL (ref 4.6–6.2)
RBC # BLD AUTO: 2.44 M/UL (ref 4.6–6.2)
SODIUM SERPL-SCNC: 139 MMOL/L (ref 136–145)
SODIUM SERPL-SCNC: 140 MMOL/L (ref 136–145)
SODIUM SERPL-SCNC: NORMAL MMOL/L (ref 136–145)
SPECIMEN OUTDATE: NORMAL
VANCOMYCIN SERPL-MCNC: 7.8 UG/ML
WBC # BLD AUTO: 13.09 K/UL (ref 3.9–12.7)
WBC # BLD AUTO: 18.23 K/UL (ref 3.9–12.7)

## 2023-04-19 PROCEDURE — 36430 TRANSFUSION BLD/BLD COMPNT: CPT

## 2023-04-19 PROCEDURE — 25000003 PHARM REV CODE 250: Performed by: STUDENT IN AN ORGANIZED HEALTH CARE EDUCATION/TRAINING PROGRAM

## 2023-04-19 PROCEDURE — 11044 DEBRIDEMENT: ICD-10-PCS | Mod: ,,, | Performed by: FAMILY MEDICINE

## 2023-04-19 PROCEDURE — 99900031 HC PATIENT EDUCATION (STAT)

## 2023-04-19 PROCEDURE — 85610 PROTHROMBIN TIME: CPT | Performed by: INTERNAL MEDICINE

## 2023-04-19 PROCEDURE — 87070 CULTURE OTHR SPECIMN AEROBIC: CPT | Performed by: FAMILY MEDICINE

## 2023-04-19 PROCEDURE — 87186 SC STD MICRODIL/AGAR DIL: CPT | Performed by: FAMILY MEDICINE

## 2023-04-19 PROCEDURE — 83036 HEMOGLOBIN GLYCOSYLATED A1C: CPT | Performed by: INTERNAL MEDICINE

## 2023-04-19 PROCEDURE — 63600175 PHARM REV CODE 636 W HCPCS: Performed by: INTERNAL MEDICINE

## 2023-04-19 PROCEDURE — 25000003 PHARM REV CODE 250: Performed by: INTERNAL MEDICINE

## 2023-04-19 PROCEDURE — 84100 ASSAY OF PHOSPHORUS: CPT | Performed by: INTERNAL MEDICINE

## 2023-04-19 PROCEDURE — 85025 COMPLETE CBC W/AUTO DIFF WBC: CPT | Mod: 91 | Performed by: INTERNAL MEDICINE

## 2023-04-19 PROCEDURE — 84145 PROCALCITONIN (PCT): CPT | Performed by: INTERNAL MEDICINE

## 2023-04-19 PROCEDURE — 83605 ASSAY OF LACTIC ACID: CPT | Mod: 91 | Performed by: INTERNAL MEDICINE

## 2023-04-19 PROCEDURE — 83735 ASSAY OF MAGNESIUM: CPT | Performed by: INTERNAL MEDICINE

## 2023-04-19 PROCEDURE — 99900035 HC TECH TIME PER 15 MIN (STAT)

## 2023-04-19 PROCEDURE — 11047 DEBRIDEMENT: ICD-10-PCS | Mod: ,,, | Performed by: FAMILY MEDICINE

## 2023-04-19 PROCEDURE — 27000221 HC OXYGEN, UP TO 24 HOURS

## 2023-04-19 PROCEDURE — 63600175 PHARM REV CODE 636 W HCPCS: Performed by: STUDENT IN AN ORGANIZED HEALTH CARE EDUCATION/TRAINING PROGRAM

## 2023-04-19 PROCEDURE — 84100 ASSAY OF PHOSPHORUS: CPT | Mod: 91 | Performed by: INTERNAL MEDICINE

## 2023-04-19 PROCEDURE — 83735 ASSAY OF MAGNESIUM: CPT | Mod: 91 | Performed by: INTERNAL MEDICINE

## 2023-04-19 PROCEDURE — 99223 PR INITIAL HOSPITAL CARE,LEVL III: ICD-10-PCS | Mod: 25,,, | Performed by: FAMILY MEDICINE

## 2023-04-19 PROCEDURE — 87077 CULTURE AEROBIC IDENTIFY: CPT | Performed by: FAMILY MEDICINE

## 2023-04-19 PROCEDURE — 94761 N-INVAS EAR/PLS OXIMETRY MLT: CPT

## 2023-04-19 PROCEDURE — P9017 PLASMA 1 DONOR FRZ W/IN 8 HR: HCPCS | Performed by: STUDENT IN AN ORGANIZED HEALTH CARE EDUCATION/TRAINING PROGRAM

## 2023-04-19 PROCEDURE — 99223 1ST HOSP IP/OBS HIGH 75: CPT | Mod: 25,,, | Performed by: FAMILY MEDICINE

## 2023-04-19 PROCEDURE — 87641 MR-STAPH DNA AMP PROBE: CPT | Performed by: STUDENT IN AN ORGANIZED HEALTH CARE EDUCATION/TRAINING PROGRAM

## 2023-04-19 PROCEDURE — 11047 DBRDMT BONE EACH ADDL: CPT | Mod: ,,, | Performed by: FAMILY MEDICINE

## 2023-04-19 PROCEDURE — 80202 ASSAY OF VANCOMYCIN: CPT | Performed by: INTERNAL MEDICINE

## 2023-04-19 PROCEDURE — 36415 COLL VENOUS BLD VENIPUNCTURE: CPT | Performed by: INTERNAL MEDICINE

## 2023-04-19 PROCEDURE — 99223 PR INITIAL HOSPITAL CARE,LEVL III: ICD-10-PCS | Mod: ,,, | Performed by: STUDENT IN AN ORGANIZED HEALTH CARE EDUCATION/TRAINING PROGRAM

## 2023-04-19 PROCEDURE — 85610 PROTHROMBIN TIME: CPT | Mod: 91 | Performed by: INTERNAL MEDICINE

## 2023-04-19 PROCEDURE — 85610 PROTHROMBIN TIME: CPT | Mod: 91 | Performed by: STUDENT IN AN ORGANIZED HEALTH CARE EDUCATION/TRAINING PROGRAM

## 2023-04-19 PROCEDURE — 11044 DBRDMT BONE 1ST 20 SQ CM/<: CPT | Mod: ,,, | Performed by: FAMILY MEDICINE

## 2023-04-19 PROCEDURE — 20000000 HC ICU ROOM

## 2023-04-19 PROCEDURE — 87075 CULTR BACTERIA EXCEPT BLOOD: CPT | Performed by: FAMILY MEDICINE

## 2023-04-19 PROCEDURE — 80053 COMPREHEN METABOLIC PANEL: CPT | Performed by: INTERNAL MEDICINE

## 2023-04-19 PROCEDURE — 25000003 PHARM REV CODE 250

## 2023-04-19 PROCEDURE — 99223 1ST HOSP IP/OBS HIGH 75: CPT | Mod: ,,, | Performed by: STUDENT IN AN ORGANIZED HEALTH CARE EDUCATION/TRAINING PROGRAM

## 2023-04-19 PROCEDURE — 80053 COMPREHEN METABOLIC PANEL: CPT | Mod: 91 | Performed by: INTERNAL MEDICINE

## 2023-04-19 PROCEDURE — 86900 BLOOD TYPING SEROLOGIC ABO: CPT | Performed by: STUDENT IN AN ORGANIZED HEALTH CARE EDUCATION/TRAINING PROGRAM

## 2023-04-19 PROCEDURE — 82962 GLUCOSE BLOOD TEST: CPT

## 2023-04-19 RX ORDER — IBUPROFEN 200 MG
24 TABLET ORAL
Status: DISCONTINUED | OUTPATIENT
Start: 2023-04-19 | End: 2023-04-19 | Stop reason: HOSPADM

## 2023-04-19 RX ORDER — POTASSIUM CHLORIDE 7.45 MG/ML
80 INJECTION INTRAVENOUS
Status: DISCONTINUED | OUTPATIENT
Start: 2023-04-19 | End: 2023-04-28 | Stop reason: HOSPADM

## 2023-04-19 RX ORDER — IBUPROFEN 200 MG
16 TABLET ORAL
Status: DISCONTINUED | OUTPATIENT
Start: 2023-04-19 | End: 2023-04-19

## 2023-04-19 RX ORDER — CALCIUM GLUCONATE 20 MG/ML
2 INJECTION, SOLUTION INTRAVENOUS
Status: DISCONTINUED | OUTPATIENT
Start: 2023-04-19 | End: 2023-04-28 | Stop reason: HOSPADM

## 2023-04-19 RX ORDER — MAGNESIUM SULFATE HEPTAHYDRATE 40 MG/ML
4 INJECTION, SOLUTION INTRAVENOUS
Status: DISCONTINUED | OUTPATIENT
Start: 2023-04-19 | End: 2023-04-19 | Stop reason: HOSPADM

## 2023-04-19 RX ORDER — ACETAMINOPHEN 325 MG/1
650 TABLET ORAL EVERY 4 HOURS PRN
Status: DISCONTINUED | OUTPATIENT
Start: 2023-04-19 | End: 2023-04-28 | Stop reason: HOSPADM

## 2023-04-19 RX ORDER — MAGNESIUM SULFATE HEPTAHYDRATE 40 MG/ML
2 INJECTION, SOLUTION INTRAVENOUS
Status: DISCONTINUED | OUTPATIENT
Start: 2023-04-19 | End: 2023-04-28 | Stop reason: HOSPADM

## 2023-04-19 RX ORDER — POTASSIUM CHLORIDE 7.45 MG/ML
40 INJECTION INTRAVENOUS
Status: DISCONTINUED | OUTPATIENT
Start: 2023-04-19 | End: 2023-04-19 | Stop reason: HOSPADM

## 2023-04-19 RX ORDER — SODIUM CHLORIDE 9 MG/ML
INJECTION, SOLUTION INTRAVENOUS CONTINUOUS
Status: DISCONTINUED | OUTPATIENT
Start: 2023-04-19 | End: 2023-04-19 | Stop reason: HOSPADM

## 2023-04-19 RX ORDER — CHLORHEXIDINE GLUCONATE ORAL RINSE 1.2 MG/ML
15 SOLUTION DENTAL 2 TIMES DAILY
Status: DISPENSED | OUTPATIENT
Start: 2023-04-19 | End: 2023-04-24

## 2023-04-19 RX ORDER — GLUCAGON 1 MG
1 KIT INJECTION
Status: DISCONTINUED | OUTPATIENT
Start: 2023-04-19 | End: 2023-04-19 | Stop reason: HOSPADM

## 2023-04-19 RX ORDER — MAGNESIUM SULFATE HEPTAHYDRATE 40 MG/ML
2 INJECTION, SOLUTION INTRAVENOUS
Status: DISCONTINUED | OUTPATIENT
Start: 2023-04-19 | End: 2023-04-19 | Stop reason: HOSPADM

## 2023-04-19 RX ORDER — CALCIUM GLUCONATE 20 MG/ML
1 INJECTION, SOLUTION INTRAVENOUS
Status: DISCONTINUED | OUTPATIENT
Start: 2023-04-19 | End: 2023-04-28 | Stop reason: HOSPADM

## 2023-04-19 RX ORDER — ONDANSETRON 2 MG/ML
4 INJECTION INTRAMUSCULAR; INTRAVENOUS EVERY 6 HOURS PRN
Status: DISCONTINUED | OUTPATIENT
Start: 2023-04-19 | End: 2023-04-19 | Stop reason: HOSPADM

## 2023-04-19 RX ORDER — POTASSIUM CHLORIDE 7.45 MG/ML
60 INJECTION INTRAVENOUS
Status: DISCONTINUED | OUTPATIENT
Start: 2023-04-19 | End: 2023-04-28 | Stop reason: HOSPADM

## 2023-04-19 RX ORDER — HYDROCODONE BITARTRATE AND ACETAMINOPHEN 500; 5 MG/1; MG/1
TABLET ORAL
Status: DISCONTINUED | OUTPATIENT
Start: 2023-04-19 | End: 2023-04-28 | Stop reason: HOSPADM

## 2023-04-19 RX ORDER — CALCIUM GLUCONATE 20 MG/ML
1 INJECTION, SOLUTION INTRAVENOUS
Status: DISCONTINUED | OUTPATIENT
Start: 2023-04-19 | End: 2023-04-19 | Stop reason: HOSPADM

## 2023-04-19 RX ORDER — IBUPROFEN 200 MG
16 TABLET ORAL
Status: DISCONTINUED | OUTPATIENT
Start: 2023-04-19 | End: 2023-04-19 | Stop reason: HOSPADM

## 2023-04-19 RX ORDER — POLYETHYLENE GLYCOL 3350 17 G/17G
17 POWDER, FOR SOLUTION ORAL 2 TIMES DAILY PRN
Status: DISCONTINUED | OUTPATIENT
Start: 2023-04-19 | End: 2023-04-19 | Stop reason: HOSPADM

## 2023-04-19 RX ORDER — POTASSIUM CHLORIDE 7.45 MG/ML
40 INJECTION INTRAVENOUS
Status: DISCONTINUED | OUTPATIENT
Start: 2023-04-19 | End: 2023-04-28 | Stop reason: HOSPADM

## 2023-04-19 RX ORDER — ACETAMINOPHEN 325 MG/1
650 TABLET ORAL EVERY 4 HOURS PRN
Status: DISCONTINUED | OUTPATIENT
Start: 2023-04-19 | End: 2023-04-19 | Stop reason: HOSPADM

## 2023-04-19 RX ORDER — CALCIUM GLUCONATE 20 MG/ML
3 INJECTION, SOLUTION INTRAVENOUS
Status: DISCONTINUED | OUTPATIENT
Start: 2023-04-19 | End: 2023-04-19 | Stop reason: HOSPADM

## 2023-04-19 RX ORDER — INSULIN ASPART 100 [IU]/ML
0-5 INJECTION, SOLUTION INTRAVENOUS; SUBCUTANEOUS
Status: DISCONTINUED | OUTPATIENT
Start: 2023-04-19 | End: 2023-04-28 | Stop reason: HOSPADM

## 2023-04-19 RX ORDER — POLYETHYLENE GLYCOL 3350 17 G/17G
17 POWDER, FOR SOLUTION ORAL 2 TIMES DAILY PRN
Status: DISCONTINUED | OUTPATIENT
Start: 2023-04-19 | End: 2023-04-28 | Stop reason: HOSPADM

## 2023-04-19 RX ORDER — GLUCAGON 1 MG
1 KIT INJECTION
Status: DISCONTINUED | OUTPATIENT
Start: 2023-04-19 | End: 2023-04-19

## 2023-04-19 RX ORDER — NOREPINEPHRINE BITARTRATE/D5W 4MG/250ML
0-3 PLASTIC BAG, INJECTION (ML) INTRAVENOUS CONTINUOUS
Status: DISCONTINUED | OUTPATIENT
Start: 2023-04-19 | End: 2023-04-22

## 2023-04-19 RX ORDER — POTASSIUM CHLORIDE 7.45 MG/ML
60 INJECTION INTRAVENOUS
Status: DISCONTINUED | OUTPATIENT
Start: 2023-04-19 | End: 2023-04-19 | Stop reason: HOSPADM

## 2023-04-19 RX ORDER — IBUPROFEN 200 MG
16 TABLET ORAL
Status: DISCONTINUED | OUTPATIENT
Start: 2023-04-19 | End: 2023-04-28 | Stop reason: HOSPADM

## 2023-04-19 RX ORDER — IBUPROFEN 200 MG
24 TABLET ORAL
Status: DISCONTINUED | OUTPATIENT
Start: 2023-04-19 | End: 2023-04-19

## 2023-04-19 RX ORDER — SODIUM CHLORIDE 9 MG/ML
INJECTION, SOLUTION INTRAVENOUS CONTINUOUS
Status: DISCONTINUED | OUTPATIENT
Start: 2023-04-19 | End: 2023-04-28 | Stop reason: HOSPADM

## 2023-04-19 RX ORDER — TALC
6 POWDER (GRAM) TOPICAL NIGHTLY PRN
Status: DISCONTINUED | OUTPATIENT
Start: 2023-04-19 | End: 2023-04-19 | Stop reason: HOSPADM

## 2023-04-19 RX ORDER — GLUCAGON 1 MG
1 KIT INJECTION
Status: DISCONTINUED | OUTPATIENT
Start: 2023-04-19 | End: 2023-04-28 | Stop reason: HOSPADM

## 2023-04-19 RX ORDER — PHYTONADIONE 5 MG/1
10 TABLET ORAL ONCE
Status: COMPLETED | OUTPATIENT
Start: 2023-04-19 | End: 2023-04-19

## 2023-04-19 RX ORDER — POTASSIUM CHLORIDE 7.45 MG/ML
80 INJECTION INTRAVENOUS
Status: DISCONTINUED | OUTPATIENT
Start: 2023-04-19 | End: 2023-04-19 | Stop reason: HOSPADM

## 2023-04-19 RX ORDER — ONDANSETRON 2 MG/ML
4 INJECTION INTRAMUSCULAR; INTRAVENOUS EVERY 6 HOURS PRN
Status: DISCONTINUED | OUTPATIENT
Start: 2023-04-19 | End: 2023-04-28 | Stop reason: HOSPADM

## 2023-04-19 RX ORDER — MAGNESIUM SULFATE HEPTAHYDRATE 40 MG/ML
4 INJECTION, SOLUTION INTRAVENOUS
Status: DISCONTINUED | OUTPATIENT
Start: 2023-04-19 | End: 2023-04-28 | Stop reason: HOSPADM

## 2023-04-19 RX ORDER — CALCIUM GLUCONATE 20 MG/ML
2 INJECTION, SOLUTION INTRAVENOUS
Status: DISCONTINUED | OUTPATIENT
Start: 2023-04-19 | End: 2023-04-19 | Stop reason: HOSPADM

## 2023-04-19 RX ORDER — IBUPROFEN 200 MG
24 TABLET ORAL
Status: DISCONTINUED | OUTPATIENT
Start: 2023-04-19 | End: 2023-04-28 | Stop reason: HOSPADM

## 2023-04-19 RX ORDER — CALCIUM GLUCONATE 20 MG/ML
3 INJECTION, SOLUTION INTRAVENOUS
Status: DISCONTINUED | OUTPATIENT
Start: 2023-04-19 | End: 2023-04-28 | Stop reason: HOSPADM

## 2023-04-19 RX ORDER — CEFEPIME HYDROCHLORIDE 1 G/50ML
1 INJECTION, SOLUTION INTRAVENOUS
Status: DISCONTINUED | OUTPATIENT
Start: 2023-04-19 | End: 2023-04-19 | Stop reason: HOSPADM

## 2023-04-19 RX ORDER — TALC
6 POWDER (GRAM) TOPICAL NIGHTLY PRN
Status: DISCONTINUED | OUTPATIENT
Start: 2023-04-19 | End: 2023-04-28 | Stop reason: HOSPADM

## 2023-04-19 RX ADMIN — ACETAMINOPHEN 650 MG: 325 TABLET ORAL at 10:04

## 2023-04-19 RX ADMIN — NOREPINEPHRINE BITARTRATE 0.32 MCG/KG/MIN: 4 INJECTION, SOLUTION INTRAVENOUS at 03:04

## 2023-04-19 RX ADMIN — NOREPINEPHRINE BITARTRATE 0.34 MCG/KG/MIN: 4 INJECTION, SOLUTION INTRAVENOUS at 01:04

## 2023-04-19 RX ADMIN — PHYTONADIONE 10 MG: 10 INJECTION, EMULSION INTRAMUSCULAR; INTRAVENOUS; SUBCUTANEOUS at 05:04

## 2023-04-19 RX ADMIN — POLYETHYLENE GLYCOL 3350 17 G: 17 POWDER, FOR SOLUTION ORAL at 06:04

## 2023-04-19 RX ADMIN — CHLORHEXIDINE GLUCONATE 15 ML: 1.2 RINSE ORAL at 09:04

## 2023-04-19 RX ADMIN — PHYTONADIONE 10 MG: 5 TABLET ORAL at 03:04

## 2023-04-19 RX ADMIN — NOREPINEPHRINE BITARTRATE 0.22 MCG/KG/MIN: 1 INJECTION, SOLUTION, CONCENTRATE INTRAVENOUS at 09:04

## 2023-04-19 RX ADMIN — MEROPENEM 1 G: 1 INJECTION, POWDER, FOR SOLUTION INTRAVENOUS at 10:04

## 2023-04-19 RX ADMIN — PHYTONADIONE 10 MG: 10 INJECTION, EMULSION INTRAMUSCULAR; INTRAVENOUS; SUBCUTANEOUS at 08:04

## 2023-04-19 RX ADMIN — VANCOMYCIN HYDROCHLORIDE 750 MG: 750 INJECTION, POWDER, LYOPHILIZED, FOR SOLUTION INTRAVENOUS at 03:04

## 2023-04-19 RX ADMIN — SODIUM CHLORIDE: 0.9 INJECTION, SOLUTION INTRAVENOUS at 03:04

## 2023-04-19 RX ADMIN — NOREPINEPHRINE BITARTRATE 0.3 MCG/KG/MIN: 4 INJECTION, SOLUTION INTRAVENOUS at 05:04

## 2023-04-19 RX ADMIN — VANCOMYCIN HYDROCHLORIDE 1500 MG: 1.5 INJECTION, POWDER, LYOPHILIZED, FOR SOLUTION INTRAVENOUS at 04:04

## 2023-04-19 RX ADMIN — MEROPENEM 1 G: 1 INJECTION, POWDER, FOR SOLUTION INTRAVENOUS at 11:04

## 2023-04-19 RX ADMIN — SODIUM CHLORIDE: 0.9 INJECTION, SOLUTION INTRAVENOUS at 09:04

## 2023-04-19 RX ADMIN — NOREPINEPHRINE BITARTRATE 0.3 MCG/KG/MIN: 4 INJECTION, SOLUTION INTRAVENOUS at 07:04

## 2023-04-19 NOTE — SUBJECTIVE & OBJECTIVE
Past Medical History:   Diagnosis Date    Anemia     Anemia of other chronic disease 09/13/2017    Anemia, chronic renal failure 09/13/2017    Anemia, unspecified 09/13/2017    Anticoagulant long-term use     Aorta aneurysm     Arthritis     Atrial fibrillation     Bacteremia due to Streptococcus 01/08/2022    CAD (coronary artery disease)     CHF (congestive heart failure)     Chronic kidney disease     Clotting disorder 09/13/2017    Colon polyp     Dementia     Diabetes mellitus     not on meds    Diabetes mellitus type II     Diverticulosis     Elevated PSA     Encounter for blood transfusion     Former smoker     General anesthetics causing adverse effect in therapeutic use     TROUBLE COMING OUT OF ANESTHESIA WITH GASTRIC BYPASS    GERD (gastroesophageal reflux disease)     Gout     Hx of colonic polyps     Hypercoagulable state     Hyperlipidemia     Hypertension     MI, old 2010    MTHFR mutation     Myocardial infarction     9/2010    Osteomyelitis of ankle or foot 03/09/2017    Prostate cancer 06/2016    Sleep apnea     Squamous cell carcinoma 01/23/2018    Left helix, imiquimod    Venous stasis     Chronic       Past Surgical History:   Procedure Laterality Date    ABDOMINAL SURGERY      CARDIAC SURGERY      3 STENTS     CAUDAL EPIDURAL STEROID INJECTION N/A 6/22/2020    Procedure: INJECTION, STEROID, SPINE, EPIDURAL, CAUDAL;  Surgeon: Evangelist Bose MD;  Location: Replaced by [facility];  Service: Pain Management;  Laterality: N/A;    COLONOSCOPY  02/2011    diverticulosis with diverticula with clot, no active bleeding    COLONOSCOPY N/A 8/24/2016    Procedure: COLONOSCOPY;  Surgeon: Saroj Borjas MD;  Location: [facility];  Service: Endoscopy;  Laterality: N/A;    COLONOSCOPY N/A 11/18/2020    Procedure: COLONOSCOPY;  Surgeon: Saroj Borjas MD;  Location: [facility];  Service: Endoscopy;  Laterality: N/A;    COLONOSCOPY N/A 10/27/2021    Procedure: COLONOSCOPY;  Surgeon: Saroj Borjas MD;  Location: [facility]  ENDO;  Service: Endoscopy;  Laterality: N/A;    EPIDURAL STEROID INJECTION INTO LUMBAR SPINE N/A 6/22/2020    Procedure: Injection-steroid-epidural-lumbar;  Surgeon: Evangelist Bose MD;  Location: [facility] OR;  Service: Pain Management;  Laterality: N/A;  L3 L4 L5 S1    EPIDURAL STEROID INJECTION INTO LUMBAR SPINE N/A 9/21/2020    Procedure: Injection-steroid-epidural-lumbar;  Surgeon: Evangelist Bose MD;  Location: [facility] OR;  Service: Pain Management;  Laterality: N/A;  L5-S1    FUSION, SPINE, CERVICAL N/A 3/24/2023    Procedure: FUSION, SPINE, CERVICAL;  Surgeon: Kike Kc DO;  Location: [facility] OR;  Service: Neurosurgery;  Laterality: N/A;  posterior cervical decompression/fusion C3-T1    GASTRIC BYPASS  2/5/2008    IVC FILTER RETRIEVAL      JOINT REPLACEMENT  1996 and 2001    bi-lat hip replacement/Rt Hip and Lt Hip    RADIOFREQUENCY ABLATION OF LUMBAR MEDIAL BRANCH NERVE AT SINGLE LEVEL Bilateral 1/10/2020    Procedure: Radiofrequency Ablation, Nerve, Spinal, Lumbar, Medial Branch, 1 Level;  Surgeon: Evangelist Bose MD;  Location: [facility] OR;  Service: Pain Management;  Laterality: Bilateral;  L3,L4,L5    RADIOFREQUENCY ABLATION OF LUMBAR MEDIAL BRANCH NERVE AT SINGLE LEVEL Bilateral 11/23/2021    Procedure: Radiofrequency Ablation, Nerve, Spinal, Lumbar, Medial Branch, Bilateral L 3,4,5;  Surgeon: Nile Causey MD;  Location: [facility] OR;  Service: Pain Management;  Laterality: Bilateral;    ROTATOR CUFF REPAIR      Rt shoulder    Stents  8/18/2010    x 3    UPPER GASTROINTESTINAL ENDOSCOPY  02/2011       Review of patient's allergies indicates:   Allergen Reactions    Donepezil Other (See Comments)     AMS    Bactroban [mupirocin calcium] Blisters     Causes Blisters    Shellfish containing products Other (See Comments)     Other reaction(s): Gout  OYSTERS       Current Facility-Administered Medications on File Prior to Encounter   Medication    [COMPLETED] ceFEPIme (MAXIPIME) 2 g in dextrose 5 % in water (D5W) 5 %  50 mL IVPB (MB+)    lactated ringers infusion    [COMPLETED] morphine injection 2 mg    [COMPLETED] sodium chloride 0.9% bolus 1,000 mL 1,000 mL    [DISCONTINUED] 0.9%  NaCl infusion    [DISCONTINUED] acetaminophen tablet 650 mg    [DISCONTINUED] calcium gluconate 1 g in NS IVPB (premixed)    [DISCONTINUED] calcium gluconate 1 g in NS IVPB (premixed)    [DISCONTINUED] calcium gluconate 1 g in NS IVPB (premixed)    [DISCONTINUED] cefepime in dextrose 5 % 1 gram/50 mL IVPB 1 g    [DISCONTINUED] dextrose 50% injection 12.5 g    [DISCONTINUED] dextrose 50% injection 25 g    [DISCONTINUED] glucagon (human recombinant) injection 1 mg    [DISCONTINUED] glucose chewable tablet 16 g    [DISCONTINUED] glucose chewable tablet 24 g    [DISCONTINUED] magnesium sulfate 2g in water 50mL IVPB (premix)    [DISCONTINUED] magnesium sulfate 2g in water 50mL IVPB (premix)    [DISCONTINUED] melatonin tablet 6 mg    [DISCONTINUED] morphine injection 4 mg    [DISCONTINUED] NORepinephrine 4 mg in dextrose 5% 250 mL infusion (premix) (titrating)    [DISCONTINUED] ondansetron injection 4 mg    [DISCONTINUED] polyethylene glycol packet 17 g    [DISCONTINUED] potassium chloride 10 mEq in 100 mL IVPB    [DISCONTINUED] potassium chloride 10 mEq in 100 mL IVPB    [DISCONTINUED] potassium chloride 10 mEq in 100 mL IVPB    [DISCONTINUED] sodium chloride 0.9% bolus 1,000 mL 1,000 mL    [DISCONTINUED] sodium chloride 0.9% bolus 500 mL 500 mL    [DISCONTINUED] sodium hypochlorite (DAKIN'S) external solution 0.25%    [DISCONTINUED] sodium phosphate 15 mmol in dextrose 5 % (D5W) 250 mL IVPB    [DISCONTINUED] sodium phosphate 20.01 mmol in dextrose 5 % (D5W) 250 mL IVPB    [DISCONTINUED] sodium phosphate 30 mmol in dextrose 5 % (D5W) 250 mL IVPB    [DISCONTINUED] vancomycin (VANCOCIN) 2,000 mg in dextrose 5 % (D5W) 500 mL IVPB    [DISCONTINUED] vancomycin - pharmacy to dose    [COMPLETED] vancomycin 1,250 mg in dextrose 5 % (D5W) 250 mL IVPB (Vial-Mate)      Current Outpatient Medications on File Prior to Encounter   Medication Sig    allopurinoL (ZYLOPRIM) 100 MG tablet Take 1 tablet (100 mg total) by mouth every evening.    amiodarone (PACERONE) 200 MG Tab Take 1 tablet (200 mg total) by mouth 2 (two) times daily.    aspirin (ECOTRIN) 81 MG EC tablet Take 81 mg by mouth once daily.    atorvastatin (LIPITOR) 80 MG tablet Take 1 tablet (80 mg total) by mouth once daily.    calcitRIOL (ROCALTROL) 0.5 MCG Cap Take 1 capsule (0.5 mcg total) by mouth once daily.    clopidogreL (PLAVIX) 75 mg tablet Take 1 tablet (75 mg total) by mouth once daily.    doxycycline (VIBRA-TABS) 100 MG tablet Take 100 mg by mouth 2 (two) times daily.    famotidine (PEPCID) 40 MG tablet Take 1 tablet (40 mg total) by mouth once daily.    ferrous sulfate (FEROSUL) 325 mg (65 mg iron) Tab tablet TAKE 1 TABLET BY MOUTH TWICE DAILY    fluticasone propionate (FLONASE) 50 mcg/actuation nasal spray SHAKE LIQUID AND USE 2 SPRAYS(100 MCG) IN EACH NOSTRIL EVERY DAY    FOLIC ACID/MULTIVIT-MIN/LUTEIN (CENTRUM SILVER ORAL) Take 1 tablet by mouth once daily.    lisinopriL (PRINIVIL,ZESTRIL) 20 MG tablet Take 1 tablet (20 mg total) by mouth every evening.    magnesium oxide (MAG-OX) 400 mg (241.3 mg magnesium) tablet Take 1 tablet (400 mg total) by mouth once daily.    mecobal-levomefolat Ca-B6 phos (L-METHYL-B6-B12) 3-35-2 mg Tab Take 1 tablet by mouth 2 (two) times daily.    mirabegron (MYRBETRIQ) 50 mg Tb24 Take 1 tablet (50 mg total) by mouth once daily.    nitroGLYCERIN 0.4 MG/DOSE TL SPRY (NITROLINGUAL) 400 mcg/spray spray PLACE 1 SPRAY UNDER THE TONGUE EVERY 5 MINUTES AS NEEDED FOR CHEST PAIN    omeprazole (PRILOSEC) 20 MG capsule Take 1 capsule (20 mg total) by mouth once daily.    prothrombin time/INR test metr Misc 1 Stick by Misc.(Non-Drug; Combo Route) route every 30 days.    warfarin (COUMADIN) 5 MG tablet Take 1 tablet (5 mg total) by mouth Daily.    warfarin (COUMADIN) 5 MG tablet Take 1  tablet (5 mg total) by mouth Daily.     Family History       Problem Relation (Age of Onset)    Heart attack Mother, Father, Brother    Lupus Daughter    Ulcerative colitis Daughter (35)          Tobacco Use    Smoking status: Former    Smokeless tobacco: Never    Tobacco comments:     47 yrs ago, only smoked 3-4 packs in his entire life as a teenager   Substance and Sexual Activity    Alcohol use: Not Currently     Comment: rare    Drug use: No    Sexual activity: Not Currently     Review of Systems   Reason unable to perform ROS: Sedated from medication.   Objective:     Vital Signs (Most Recent):  Pulse: (!) 57 (04/19/23 0217)  Resp: 17 (04/19/23 0217)  BP: 133/63 (04/19/23 0101)  SpO2: 100 % (04/19/23 0217)   Vital Signs (24h Range):  Temp:  [98.6 °F (37 °C)-98.7 °F (37.1 °C)] 98.7 °F (37.1 °C)  Pulse:  [40-86] 57  Resp:  [17-22] 17  SpO2:  [84 %-100 %] 100 %  BP: ()/(40-90) 133/63  Arterial Line BP: ()/(35-49) 135/39     Weight: 100.4 kg (221 lb 5.5 oz)  Body mass index is 33.65 kg/m².    Physical Exam  Vitals and nursing note reviewed.   Constitutional:       General: He is not in acute distress.     Appearance: He is well-developed.      Comments: Sedated from pain medication given in the ED  Diffuse bruising to face and extremities   Chronically ill appearing   HENT:      Head: Normocephalic and atraumatic.      Comments: Cervical collar is in place  Eyes:      Pupils: Pupils are equal, round, and reactive to light.   Cardiovascular:      Rate and Rhythm: Regular rhythm.      Heart sounds: No murmur heard.  Pulmonary:      Effort: Pulmonary effort is normal.      Breath sounds: Normal breath sounds. No wheezing.      Comments: O2 per NC  Abdominal:      General: There is no distension.      Palpations: Abdomen is soft.      Tenderness: There is no abdominal tenderness. There is no guarding or rebound.   Genitourinary:     Comments: Ruelas draining yellow urine   Musculoskeletal:         General:  Normal range of motion.      Cervical back: Normal range of motion.   Skin:     Comments: Diffuse bruising  Multiple skin tears with bandages in place to UEs  BLE with ace bandages in place and heel protectors in place  Sacral decubitus noted per pictures    Neurological:      Comments: Sedated due to pain medication          CRANIAL NERVES     CN III, IV, VI   Pupils are equal, round, and reactive to light.     Significant Labs: All pertinent labs within the past 24 hours have been reviewed.  CBC:   Recent Labs   Lab 04/18/23  1328   WBC 13.09*   HGB 9.1*   HCT 27.4*        CMP:   Recent Labs   Lab 04/18/23  1328      K 4.5      CO2 21*   *   BUN 63*   CREATININE 2.5*   CALCIUM 8.8   PROT 5.7*   ALBUMIN 1.9*   BILITOT 0.8   ALKPHOS 131   AST 65*   ALT 53*   ANIONGAP 9     Cardiac Markers: No results for input(s): CKMB, MYOGLOBIN, BNP, TROPISTAT in the last 48 hours.  Lactic Acid:   Recent Labs   Lab 04/18/23  1328   LACTATE 1.9     Troponin:   Recent Labs   Lab 04/18/23  1328   TROPONINI 0.101*     Urine Studies:   Recent Labs   Lab 04/18/23  1335   COLORU Yellow   APPEARANCEUA Clear   PHUR 6.0   SPECGRAV 1.015   PROTEINUA Negative   GLUCUA Negative   KETONESU Negative   BILIRUBINUA Negative   OCCULTUA Negative   NITRITE Negative   UROBILINOGEN Negative   LEUKOCYTESUR Negative       Significant Imaging: I have reviewed all pertinent imaging results/findings within the past 24 hours.  CT: I have reviewed all pertinent results/findings within the past 24 hours and my personal findings are:  CT head with no acute process  CXR: I have reviewed all pertinent results/findings within the past 24 hours and my personal findings are:  CXR with no consolidation or pulmonary edema

## 2023-04-19 NOTE — PLAN OF CARE
Problem: Glycemic Control Impaired (Sepsis/Septic Shock)  Goal: Blood Glucose Level Within Desired Range  Outcome: Ongoing, Progressing     Problem: Impaired Wound Healing  Goal: Optimal Wound Healing  Outcome: Ongoing, Progressing     Problem: Fall Injury Risk  Goal: Absence of Fall and Fall-Related Injury  Outcome: Ongoing, Progressing

## 2023-04-19 NOTE — PROGRESS NOTES
AM INR has increased to 9.  I have ordered oral vitamin K and am repeating INR in several hours.  No bleeding thus far.

## 2023-04-19 NOTE — CONSULTS
Chief complaint:  No chief complaint on file.      HPI:  Richard Bustos is a 77 y.o. male presenting with multiple open wounds on his arms, legs, sacrum and neck. Pt presented to the ED from [facility] for acute encephalopathy beyond his baseline dementia. On admission he was found to have multiple wounds of the BL arms, BLE, posterior neck and sacrum. Sacrum wound is necrotic and dry. Pt was unable lionel assist with his history due to his condition.     PMH:  As per HPI and below:  Past Medical History:   Diagnosis Date    Anemia     Anemia of other chronic disease 09/13/2017    Anemia, chronic renal failure 09/13/2017    Anemia, unspecified 09/13/2017    Anticoagulant long-term use     Aorta aneurysm     Arthritis     Atrial fibrillation     Bacteremia due to Streptococcus 01/08/2022    CAD (coronary artery disease)     CHF (congestive heart failure)     Chronic kidney disease     Clotting disorder 09/13/2017    Colon polyp     Dementia     Diabetes mellitus     not on meds    Diabetes mellitus type II     Diverticulosis     Elevated PSA     Encounter for blood transfusion     Former smoker     General anesthetics causing adverse effect in therapeutic use     TROUBLE COMING OUT OF ANESTHESIA WITH GASTRIC BYPASS    GERD (gastroesophageal reflux disease)     Gout     Hx of colonic polyps     Hypercoagulable state     Hyperlipidemia     Hypertension     MI, old 2010    MTHFR mutation     Myocardial infarction     9/2010    Osteomyelitis of ankle or foot 03/09/2017    Prostate cancer 06/2016    Sleep apnea     Squamous cell carcinoma 01/23/2018    Left helix, imiquimod    Venous stasis     Chronic       Social History     Socioeconomic History    Marital status:    Tobacco Use    Smoking status: Former    Smokeless tobacco: Never    Tobacco comments:     45 yrs ago, only smoked 3-4 packs in his entire life as a teenager   Substance and Sexual Activity    Alcohol use: Not Currently     Comment: rare    Drug use:  No    Sexual activity: Not Currently       Past Surgical History:   Procedure Laterality Date    ABDOMINAL SURGERY      CARDIAC SURGERY      3 STENTS     CAUDAL EPIDURAL STEROID INJECTION N/A 6/22/2020    Procedure: INJECTION, STEROID, SPINE, EPIDURAL, CAUDAL;  Surgeon: Evangelist Bose MD;  Location: [facility] OR;  Service: Pain Management;  Laterality: N/A;    COLONOSCOPY  02/2011    diverticulosis with diverticula with clot, no active bleeding    COLONOSCOPY N/A 8/24/2016    Procedure: COLONOSCOPY;  Surgeon: Saroj Borjas MD;  Location: [facility] ENDO;  Service: Endoscopy;  Laterality: N/A;    COLONOSCOPY N/A 11/18/2020    Procedure: COLONOSCOPY;  Surgeon: Saroj Borjas MD;  Location: [facility] ENDO;  Service: Endoscopy;  Laterality: N/A;    COLONOSCOPY N/A 10/27/2021    Procedure: COLONOSCOPY;  Surgeon: Saroj Borjas MD;  Location: [facility] ENDO;  Service: Endoscopy;  Laterality: N/A;    EPIDURAL STEROID INJECTION INTO LUMBAR SPINE N/A 6/22/2020    Procedure: Injection-steroid-epidural-lumbar;  Surgeon: Evangelist Bose MD;  Location: [facility] OR;  Service: Pain Management;  Laterality: N/A;  L3 L4 L5 S1    EPIDURAL STEROID INJECTION INTO LUMBAR SPINE N/A 9/21/2020    Procedure: Injection-steroid-epidural-lumbar;  Surgeon: Evangelist Bose MD;  Location: [facility] OR;  Service: Pain Management;  Laterality: N/A;  L5-S1    FUSION, SPINE, CERVICAL N/A 3/24/2023    Procedure: FUSION, SPINE, CERVICAL;  Surgeon: Kike Kc DO;  Location: [facility] OR;  Service: Neurosurgery;  Laterality: N/A;  posterior cervical decompression/fusion C3-T1    GASTRIC BYPASS  2/5/2008    IVC FILTER RETRIEVAL      JOINT REPLACEMENT  1996 and 2001    bi-lat hip replacement/Rt Hip and Lt Hip    RADIOFREQUENCY ABLATION OF LUMBAR MEDIAL BRANCH NERVE AT SINGLE LEVEL Bilateral 1/10/2020    Procedure: Radiofrequency Ablation, Nerve, Spinal, Lumbar, Medial Branch, 1 Level;  Surgeon: Evangelist Bose MD;  Location: [facility];  Service: Pain Management;   Laterality: Bilateral;  L3,L4,L5    RADIOFREQUENCY ABLATION OF LUMBAR MEDIAL BRANCH NERVE AT SINGLE LEVEL Bilateral 11/23/2021    Procedure: Radiofrequency Ablation, Nerve, Spinal, Lumbar, Medial Branch, Bilateral L 3,4,5;  Surgeon: Nile Causey MD;  Location: [facility] OR;  Service: Pain Management;  Laterality: Bilateral;    ROTATOR CUFF REPAIR      Rt shoulder    Stents  8/18/2010    x 3    UPPER GASTROINTESTINAL ENDOSCOPY  02/2011       Family History   Problem Relation Age of Onset    Heart attack Mother     Heart attack Father     Heart attack Brother     Ulcerative colitis Daughter 35    Lupus Daughter     Colon cancer Neg Hx     Colon polyps Neg Hx     Crohn's disease Neg Hx     Melanoma Neg Hx     Psoriasis Neg Hx     Eczema Neg Hx        Review of patient's allergies indicates:   Allergen Reactions    Donepezil Other (See Comments)     AMS    Bactroban [mupirocin calcium] Blisters     Causes Blisters    Shellfish containing products Other (See Comments)     Other reaction(s): Gout  OYSTERS       Current Facility-Administered Medications on File Prior to Encounter   Medication Dose Route Frequency Provider Last Rate Last Admin    [COMPLETED] ceFEPIme (MAXIPIME) 2 g in dextrose 5 % in water (D5W) 5 % 50 mL IVPB (MB+)  2 g Intravenous ED 1 Time Jaquan [facility] MD   Stopped at 04/18/23 1711    lactated ringers infusion   Intravenous Once PRN Evangelsit [facility] MD        [COMPLETED] morphine injection 2 mg  2 mg Intravenous ED 1 Time Ana Paula [facility] PA-C   2 mg at 04/18/23 1622    [DISCONTINUED] 0.9%  NaCl infusion   Intravenous Continuous [facility] MD        [DISCONTINUED] acetaminophen tablet 650 mg  650 mg Oral Q4H PRN [facility] MD        [DISCONTINUED] calcium gluconate 1 g in NS IVPB (premixed)  1 g Intravenous PRN [facility] MD        [DISCONTINUED] calcium gluconate 1 g in NS IVPB (premixed)  2 g Intravenous PRN [facility] MD        [DISCONTINUED] calcium gluconate 1 g in NS IVPB  (premixed)  3 g Intravenous PRN [facility] MD        [DISCONTINUED] cefepime in dextrose 5 % 1 gram/50 mL IVPB 1 g  1 g Intravenous Q8H [facility] MD        [DISCONTINUED] dextrose 50% injection 12.5 g  12.5 g Intravenous PRDONNA [facility] MD        [DISCONTINUED] dextrose 50% injection 25 g  25 g Intravenous PRN [facility] MD        [DISCONTINUED] glucagon (human recombinant) injection 1 mg  1 mg Intramuscular PRDONNA [facility] MD        [DISCONTINUED] glucose chewable tablet 16 g  16 g Oral PRDONNA [facility] MD        [DISCONTINUED] glucose chewable tablet 24 g  24 g Oral PALLAVI [facility] MD        [DISCONTINUED] magnesium sulfate 2g in water 50mL IVPB (premix)  2 g Intravenous PRN [facility] MD        [DISCONTINUED] magnesium sulfate 2g in water 50mL IVPB (premix)  4 g Intravenous PRN [facility] MD        [DISCONTINUED] melatonin tablet 6 mg  6 mg Oral Nightly PALLAVI [facility] MD        [DISCONTINUED] morphine injection 4 mg  4 mg Intravenous ED 1 Time Jaquan [facility] MD        [DISCONTINUED] NORepinephrine 4 mg in dextrose 5% 250 mL infusion (premix) (titrating)  0-3 mcg/kg/min Intravenous Continuous [facility] .9 mL/hr at 04/18/23 2341 0.35 mcg/kg/min at 04/18/23 2341    [DISCONTINUED] ondansetron injection 4 mg  4 mg Intravenous Q6H PRDONNA [facility] MD        [DISCONTINUED] polyethylene glycol packet 17 g  17 g Oral BID PRDONNA [facility] MD        [DISCONTINUED] potassium chloride 10 mEq in 100 mL IVPB  40 mEq Intravenous PRDONNA [facility] MD        [DISCONTINUED] potassium chloride 10 mEq in 100 mL IVPB  60 mEq Intravenous PALLAVI [facility] MD        [DISCONTINUED] potassium chloride 10 mEq in 100 mL IVPB  80 mEq Intravenous PRDONNA [facility] MD        [DISCONTINUED] sodium chloride 0.9% bolus 1,000 mL 1,000 mL  1,000 mL Intravenous ED 1 Time Jaquan Quinn MD        [DISCONTINUED] sodium chloride 0.9% bolus 500 mL 500 mL  500  mL Intravenous Once Ana Paula [facility] PA-C        [DISCONTINUED] sodium hypochlorite (DAKIN'S) external solution 0.25%   Irrigation Daily [facility] PA-C        [DISCONTINUED] sodium phosphate 15 mmol in dextrose 5 % (D5W) 250 mL IVPB  15 mmol Intravenous PRN [facility] MD        [DISCONTINUED] sodium phosphate 20.01 mmol in dextrose 5 % (D5W) 250 mL IVPB  20.01 mmol Intravenous PRN [facility] MD        [DISCONTINUED] sodium phosphate 30 mmol in dextrose 5 % (D5W) 250 mL IVPB  30 mmol Intravenous PRN [facility] MD        [DISCONTINUED] vancomycin (VANCOCIN) 2,000 mg in dextrose 5 % (D5W) 500 mL IVPB  20 mg/kg Intravenous ED 1 Time Jaquan [facility] MD        [DISCONTINUED] vancomycin - pharmacy to dose   Intravenous pharmacy to manage frequency [facility] MD        [COMPLETED] vancomycin 1,250 mg in dextrose 5 % (D5W) 250 mL IVPB (Vial-Mate)  1,250 mg Intravenous ED 1 Time Jaquan [facility] MD   Stopped at 04/18/23 4190     Current Outpatient Medications on File Prior to Encounter   Medication Sig Dispense Refill    amiodarone (PACERONE) 200 MG Tab Take 1 tablet (200 mg total) by mouth 2 (two) times daily. 60 tablet 11    aspirin (ECOTRIN) 81 MG EC tablet Take 81 mg by mouth once daily.      calcitRIOL (ROCALTROL) 0.5 MCG Cap Take 1 capsule (0.5 mcg total) by mouth once daily. 30 capsule 4    doxycycline (VIBRA-TABS) 100 MG tablet Take 100 mg by mouth 2 (two) times daily.      famotidine (PEPCID) 40 MG tablet Take 1 tablet (40 mg total) by mouth once daily. 90 tablet 3    ferrous sulfate (FEROSUL) 325 mg (65 mg iron) Tab tablet TAKE 1 TABLET BY MOUTH TWICE DAILY 180 tablet 3    fluticasone propionate (FLONASE) 50 mcg/actuation nasal spray SHAKE LIQUID AND USE 2 SPRAYS(100 MCG) IN EACH NOSTRIL EVERY DAY 16 g 5    FOLIC ACID/MULTIVIT-MIN/LUTEIN (CENTRUM SILVER ORAL) Take 1 tablet by mouth once daily.      magnesium oxide (MAG-OX) 400 mg (241.3 mg magnesium) tablet Take 1 tablet (400 mg  "total) by mouth once daily. 90 tablet 3    mecobal-levomefolat Ca-B6 phos (L-METHYL-B6-B12) 3-35-2 mg Tab Take 1 tablet by mouth 2 (two) times daily. 180 tablet 3    mirabegron (MYRBETRIQ) 50 mg Tb24 Take 1 tablet (50 mg total) by mouth once daily. 90 tablet 3    allopurinoL (ZYLOPRIM) 100 MG tablet Take 1 tablet (100 mg total) by mouth every evening. 90 tablet 3    atorvastatin (LIPITOR) 80 MG tablet Take 1 tablet (80 mg total) by mouth once daily. 90 tablet 3    clopidogreL (PLAVIX) 75 mg tablet Take 1 tablet (75 mg total) by mouth once daily. 90 tablet 3    lisinopriL (PRINIVIL,ZESTRIL) 20 MG tablet Take 1 tablet (20 mg total) by mouth every evening. 30 tablet 1    nitroGLYCERIN 0.4 MG/DOSE TL SPRY (NITROLINGUAL) 400 mcg/spray spray PLACE 1 SPRAY UNDER THE TONGUE EVERY 5 MINUTES AS NEEDED FOR CHEST PAIN 4.9 g 9    omeprazole (PRILOSEC) 20 MG capsule Take 1 capsule (20 mg total) by mouth once daily. 90 capsule 3    prothrombin time/INR test metr Misc 1 Stick by Misc.(Non-Drug; Combo Route) route every 30 days. 1 each 0    warfarin (COUMADIN) 5 MG tablet Take 1 tablet (5 mg total) by mouth Daily. 90 tablet 0    warfarin (COUMADIN) 5 MG tablet Take 1 tablet (5 mg total) by mouth Daily. 30 tablet 11       ROS: As per HPI and below:  Review of systems not obtained due to patient factors.      Physical Exam:     Vitals:    23 1445 23 1500 23 1501 23 1545   BP:  (!) 113/55  129/60   BP Location:       Patient Position:       Pulse: (!) 54 (!) 57 (!) 57 60   Resp: 15 15 (!) 21 (!) 37   Temp:   98.9 °F (37.2 °C)    TempSrc:   Axillary    SpO2: 100% 100% 100% (!) 90%   Weight:       Height:           BP  Min: 75/44  Max: 141/65  Temp  Av.9 °F (37.2 °C)  Min: 98.6 °F (37 °C)  Max: 99.2 °F (37.3 °C)  Pulse  Av.8  Min: 40  Max: 120  Resp  Av.8  Min: 12  Max: 37  SpO2  Av.4 %  Min: 71 %  Max: 100 %  Height  Av' 8" (172.7 cm)  Min: 5' 8" (172.7 cm)  Max: 5' 8" (172.7 cm)  Weight  " Av kg (227 lb 1.5 oz)  Min: 100.4 kg (221 lb 5.5 oz)  Max: 104.3 kg (230 lb)    Body mass index is 33.65 kg/m².          General:             Well developed, well nourished, no apparent distress  HEENT:              NCAT, no JVD, mucous membranes moist, EOM intact  Cardiovascular:  Regular rate and rhythm, normal S1, normal S2, No murmurs, rubs, or gallops  Respiratory:        Normal breath sounds, no wheezes, no rales, no rhonchi  Abdomen:           Bowel sounds present, non tender, non distended, no masses, no hepatojugular reflux  Extremities:        No clubbing, no cyanosis, no edema  Vascular:            2+ b/l radial.  Peripheral pulses intact.  No carotid bruits.  Neurological:      No focal deficits  Skin:                     1. Unstagable sacral pressure ulcer with DTI  2 Left arm skin tear  3. Left lateral foot un-stagable pressure ulcer  4. Right great toe un-stagable pressure ulcer/DM ulcer  5. Right foot open wound with eschar  6. Right leg open wound  7. Multiple open wounds of the left leg  8. Posterior neck un-stagable pressure ulcer  9. BLE multiple open wounds                                                Lab Results   Component Value Date    WBC 13.09 (H) 2023    HGB 7.2 (L) 2023    HCT 21.9 (L) 2023     (H) 2023     2023     Lab Results   Component Value Date    CHOL 112 03/10/2021    CHOL 114 2019    CHOL 113 (L) 2018     Lab Results   Component Value Date    HDL 49 03/10/2021    HDL 41 2019    HDL 42 2018     Lab Results   Component Value Date    LDLCALC 44 03/10/2021    LDLCALC 51 2019    LDLCALC 50.8 (L) 2018     Lab Results   Component Value Date    TRIG 96 03/10/2021    TRIG 111 2019    TRIG 101 2018     Lab Results   Component Value Date    CHOLHDL 43.8 03/10/2021    CHOLHDL 36.0 2019    CHOLHDL 37.2 2018     CMP  Recent Labs   Lab 23  1055   *   CALCIUM 8.1*    ALBUMIN 1.7*   PROT 4.8*      K 3.9   CO2 20*      BUN 60*   CREATININE 2.2*   ALKPHOS 97   ALT 35   AST 38   BILITOT 0.8      Lab Results   Component Value Date    TSH 1.009 03/21/2023         Assessment and Recommendations       Diagnoses:    1. Unstagable sacral pressure ulcer with DTI  2 Left arm skin tear  3. Left lateral foot un-stagable pressure ulcer  4. Right great toe un-stagable pressure ulcer/DM ulcer  5. Right foot open wound with eschar  6. Right leg open wound  7. Multiple open wounds of the left leg  8. Posterior neck un-stagable pressure ulcer  9. BLE multiple open wounds    Plan:  Aerobic culture of the sacrum  Anaerobic culture of the sacrum  Lumbar xray  Cervical xray  Clean sacrum with chlohroxidine, pat dry, then dakins wet to dry dressings daily, will debride tomorrow; BLE cover the wounds with xeroform ABD and kerlex; Left arm apply medihoney and cover with adaptic, ABD and kerlex; posterior neck foam dressing q 2-3 days  FU at the wound clinic on DC    Complexity:    medium

## 2023-04-19 NOTE — PLAN OF CARE
Problem: Adult Inpatient Plan of Care  Goal: Plan of Care Review  Outcome: Ongoing, Progressing  Goal: Patient-Specific Goal (Individualized)  Outcome: Ongoing, Progressing  Goal: Absence of Hospital-Acquired Illness or Injury  Outcome: Ongoing, Progressing  Goal: Optimal Comfort and Wellbeing  Outcome: Ongoing, Progressing  Goal: Readiness for Transition of Care  Outcome: Ongoing, Progressing     Problem: Diabetes Comorbidity  Goal: Blood Glucose Level Within Targeted Range  Outcome: Ongoing, Progressing     Problem: Fluid and Electrolyte Imbalance (Acute Kidney Injury/Impairment)  Goal: Fluid and Electrolyte Balance  Outcome: Ongoing, Progressing     Problem: Oral Intake Inadequate (Acute Kidney Injury/Impairment)  Goal: Optimal Nutrition Intake  Outcome: Ongoing, Progressing     Problem: Renal Function Impairment (Acute Kidney Injury/Impairment)  Goal: Effective Renal Function  Outcome: Ongoing, Progressing     Problem: Adjustment to Illness (Sepsis/Septic Shock)  Goal: Optimal Coping  Outcome: Ongoing, Progressing     Problem: Bleeding (Sepsis/Septic Shock)  Goal: Absence of Bleeding  Outcome: Ongoing, Progressing     Problem: Glycemic Control Impaired (Sepsis/Septic Shock)  Goal: Blood Glucose Level Within Desired Range  Outcome: Ongoing, Progressing     Problem: Infection Progression (Sepsis/Septic Shock)  Goal: Absence of Infection Signs and Symptoms  Outcome: Ongoing, Progressing     Problem: Nutrition Impaired (Sepsis/Septic Shock)  Goal: Optimal Nutrition Intake  Outcome: Ongoing, Progressing     Problem: Infection  Goal: Absence of Infection Signs and Symptoms  Outcome: Ongoing, Progressing     Problem: Impaired Wound Healing  Goal: Optimal Wound Healing  Outcome: Ongoing, Progressing     Problem: Fall Injury Risk  Goal: Absence of Fall and Fall-Related Injury  Outcome: Ongoing, Progressing     Problem: Skin Injury Risk Increased  Goal: Skin Health and Integrity  Outcome: Ongoing, Progressing

## 2023-04-19 NOTE — ED NOTES
Spoke with [facility]  for updated ETA, was told another hour. Pt status upgraded to STAT for decrease in BP and a-line MAP.

## 2023-04-19 NOTE — HPI
77 year old male with PMHx HTN, CAD s/p cardiac stents, lumbar DDD with chronic bilat LE weakness, HLD, gout, MI, Chronic SHF, arthritis, A-fib on Coumadin, DM II, dementia, and MTHFR mutation presents from [facility] for acute encephalopathy beyond his baseline dementia. Patient presented to [facility] ED, noted to be in shock that initially responded to IVFs but ultimately recurred and required initiation of levophed.  He is being transferred to [facility] for ICU level of care. Malodorous unstageable sacral decubitus noted in review of records and pictures reviewed. Workup in the ED revealing of mild leukocytosis, bandemia, INR 7.8 (no current, active bleeding noted), EMMY with BUN 63 and Cr 2.5 (33 and 1.4, respectively, on 4/6), mild new transaminitis,  (51 4/1), Trop 0.1.  UA and CXR did not reveal a source of infection. A mcdaniels has been placed and is draining. He is sedated/sleeping since getting morphine in the ED.     Per review of records, had fall 3/16 with cervical fracture with cord edema.  He is s/p cervical spine fusion 3/24 with Dr. Kc and has been at [facility].  Cervical collar is in place.

## 2023-04-19 NOTE — PLAN OF CARE
[facility]  Initial Discharge Assessment       Primary Care Provider: Familia Ramirez Jr, MD    Admission Diagnosis: Septic shock [A41.9, R65.21]    Admission Date: 4/19/2023  Expected Discharge Date:     Discharge Barriers Identified: None    Initial assessment completed at bedside with patient and via phone with daughter Citlali. Patient lives with Citlali but is currently at [facility] for SNF. Patient anticipates discharge back to [facility] when medically clear.    Payor: [employer] MANAGED MEDICARE / Plan: [employer] SECURE COMPLETE / Product Type: Medicare Advantage /     Extended Emergency Contact Information  Primary Emergency Contact: Citlali Mora  Address: [address] [place] [address] [place]  Home Phone: [phone]  Mobile Phone: [phone]  Relation: Daughter  Preferred language: English   needed? No  Secondary Emergency Contact: Richard Bustos jr  Address: [address] [place] [address] [place]  Mobile Phone: [phone]  Relation: Son  Preferred language: English   needed? No    Discharge Plan A: Skilled Nursing Facility  Discharge Plan B: Skilled Nursing Facility      [facility] [place] - [address]  [address]  [place] [address]  Phone: [phone] Fax: [phone]    [facility] [place] - [address]  [address] [place] [address]  Phone: [phone] Fax: [phone]      Initial Assessment (most recent)       Adult Discharge Assessment - 04/19/23 1647          Discharge Assessment    Assessment Type Discharge Planning Assessment     Confirmed/corrected address, phone number and insurance Yes     Confirmed Demographics Correct on Facesheet     Source of Information patient     Reason For Admission Septic shock     People in Home child(monty), adult     Facility Arrived From: [facility] for SNF     Do  you expect to return to your current living situation? Yes     Do you have help at home or someone to help you manage your care at home? Yes     Who are your caregiver(s) and their phone number(s)? Citlali (daughter) [phone]     Walking or Climbing Stairs ambulation difficulty, requires equipment     Mobility Management walker     Dressing/Bathing bathing difficulty, assistance 1 person     Dressing/Bathing Management Citlali (daughter) [phone]     Do you have any problems with: Needs other help     Specify other help Citlali (daughter) [phone]     Home Layout Able to live on 1st floor     Equipment Currently Used at Home bedside commode;cane, straight;walker, rolling;CPAP     Readmission within 30 days? No     Patient currently being followed by outpatient case management? No     Do you currently have service(s) that help you manage your care at home? No     Do you take prescription medications? Yes     Do you have prescription coverage? Yes     Coverage PHN     Do you have any problems affording any of your prescribed medications? No     Is the patient taking medications as prescribed? yes     Who is going to help you get home at discharge? Zahra     How do you get to doctors appointments? family or friend will provide     Are you on dialysis? No     Do you take coumadin? Yes     Who monitors your labs? Zahra PCP     Discharge Plan A Skilled Nursing Facility     Discharge Plan B Skilled Nursing Facility     DME Needed Upon Discharge  none     Discharge Plan discussed with: Patient;Adult children     Discharge Barriers Identified None        Physical Activity    On average, how many days per week do you engage in moderate to strenuous exercise (like a brisk walk)? 7 days     On average, how many minutes do you engage in exercise at this level? 150+ min        Financial Resource Strain    How hard is it for you to pay for the very basics like food, housing, medical care, and heating? Not very hard         Housing Stability    In the last 12 months, was there a time when you were not able to pay the mortgage or rent on time? No     In the last 12 months, how many places have you lived? 1     In the last 12 months, was there a time when you did not have a steady place to sleep or slept in a shelter (including now)? No        Transportation Needs    In the past 12 months, has lack of transportation kept you from medical appointments or from getting medications? No     In the past 12 months, has lack of transportation kept you from meetings, work, or from getting things needed for daily living? No        Food Insecurity    Within the past 12 months, you worried that your food would run out before you got the money to buy more. Never true     Within the past 12 months, the food you bought just didn't last and you didn't have money to get more. Never true        Stress    Do you feel stress - tense, restless, nervous, or anxious, or unable to sleep at night because your mind is troubled all the time - these days? Only a little        Social Connections    In a typical week, how many times do you talk on the phone with family, friends, or neighbors? More than three times a week     How often do you get together with friends or relatives? More than three times a week     How often do you attend Zoroastrian or Faith services? 1 to 4 times per year     Do you belong to any clubs or organizations such as Zoroastrian groups, unions, fraternal or athletic groups, or school groups? No     How often do you attend meetings of the clubs or organizations you belong to? Never     Are you , , , , never , or living with a partner?         Alcohol Use    Q1: How often do you have a drink containing alcohol? Never     Q2: How many drinks containing alcohol do you have on a typical day when you are drinking? Patient does not drink     Q3: How often do you have six or more drinks on one  occasion? Never        OTHER    Name(s) of People in Home Citlali (daughter) [phone]

## 2023-04-19 NOTE — H&P
[facility]  History & Physical    Patient Name: Richard Bustos  MRN: [id number]  Patient Class: IP- Inpatient  Admission Date: 4/19/2023  Attending Physician: Tyson Black MD  Primary Care Provider: Familia Ramirez Jr, MD         Patient information was obtained from past medical records and ER records.     Subjective:     Principal Problem:Septic shock    Chief Complaint: No chief complaint on file.       HPI: 77 year old male with PMHx HTN, CAD s/p cardiac stents, lumbar DDD with chronic bilat LE weakness, HLD, gout, MI, Chronic SHF, arthritis, A-fib on Coumadin, DM II, dementia, and MTHFR mutation presents from [facility] for acute encephalopathy beyond his baseline dementia. Patient presented to [facility] ED, noted to be in shock that initially responded to IVFs but ultimately recurred and required initiation of levophed.  He is being transferred to [facility] for ICU level of care. Malodorous unstageable sacral decubitus noted in review of records and pictures reviewed. Workup in the ED revealing of mild leukocytosis, bandemia, INR 7.8 (no current, active bleeding noted), EMMY with BUN 63 and Cr 2.5 (33 and 1.4, respectively, on 4/6), mild new transaminitis,  (51 4/1), Trop 0.1.  UA and CXR did not reveal a source of infection. A mcdaniels has been placed and is draining. He is sedated/sleeping since getting morphine in the ED.     Per review of records, had fall 3/16 with cervical fracture with cord edema.  He is s/p cervical spine fusion 3/24 with Dr. Kc and has been at [facility].  Cervical collar is in place.       Past Medical History:   Diagnosis Date    Anemia     Anemia of other chronic disease 09/13/2017    Anemia, chronic renal failure 09/13/2017    Anemia, unspecified 09/13/2017    Anticoagulant long-term use     Aorta aneurysm     Arthritis     Atrial fibrillation     Bacteremia due to Streptococcus 01/08/2022    CAD (coronary artery disease)     CHF  (congestive heart failure)     Chronic kidney disease     Clotting disorder 09/13/2017    Colon polyp     Dementia     Diabetes mellitus     not on meds    Diabetes mellitus type II     Diverticulosis     Elevated PSA     Encounter for blood transfusion     Former smoker     General anesthetics causing adverse effect in therapeutic use     TROUBLE COMING OUT OF ANESTHESIA WITH GASTRIC BYPASS    GERD (gastroesophageal reflux disease)     Gout     Hx of colonic polyps     Hypercoagulable state     Hyperlipidemia     Hypertension     MI, old 2010    MTHFR mutation     Myocardial infarction     9/2010    Osteomyelitis of ankle or foot 03/09/2017    Prostate cancer 06/2016    Sleep apnea     Squamous cell carcinoma 01/23/2018    Left helix, imiquimod    Venous stasis     Chronic       Past Surgical History:   Procedure Laterality Date    ABDOMINAL SURGERY      CARDIAC SURGERY      3 STENTS     CAUDAL EPIDURAL STEROID INJECTION N/A 6/22/2020    Procedure: INJECTION, STEROID, SPINE, EPIDURAL, CAUDAL;  Surgeon: Evangelist Bose MD;  Location: [facility] OR;  Service: Pain Management;  Laterality: N/A;    COLONOSCOPY  02/2011    diverticulosis with diverticula with clot, no active bleeding    COLONOSCOPY N/A 8/24/2016    Procedure: COLONOSCOPY;  Surgeon: Saroj Borjas MD;  Location: [facility];  Service: Endoscopy;  Laterality: N/A;    COLONOSCOPY N/A 11/18/2020    Procedure: COLONOSCOPY;  Surgeon: Saroj Borjas MD;  Location: [facility];  Service: Endoscopy;  Laterality: N/A;    COLONOSCOPY N/A 10/27/2021    Procedure: COLONOSCOPY;  Surgeon: Saroj Borjas MD;  Location: [facility];  Service: Endoscopy;  Laterality: N/A;    EPIDURAL STEROID INJECTION INTO LUMBAR SPINE N/A 6/22/2020    Procedure: Injection-steroid-epidural-lumbar;  Surgeon: Evangelist Bose MD;  Location: [facility] OR;  Service: Pain Management;  Laterality: N/A;  L3 L4 L5 S1    EPIDURAL STEROID INJECTION INTO LUMBAR SPINE N/A  9/21/2020    Procedure: Injection-steroid-epidural-lumbar;  Surgeon: Evangelist Bose MD;  Location: [facility] OR;  Service: Pain Management;  Laterality: N/A;  L5-S1    FUSION, SPINE, CERVICAL N/A 3/24/2023    Procedure: FUSION, SPINE, CERVICAL;  Surgeon: Kike Kc DO;  Location: [facility] OR;  Service: Neurosurgery;  Laterality: N/A;  posterior cervical decompression/fusion C3-T1    GASTRIC BYPASS  2/5/2008    IVC FILTER RETRIEVAL      JOINT REPLACEMENT  1996 and 2001    bi-lat hip replacement/Rt Hip and Lt Hip    RADIOFREQUENCY ABLATION OF LUMBAR MEDIAL BRANCH NERVE AT SINGLE LEVEL Bilateral 1/10/2020    Procedure: Radiofrequency Ablation, Nerve, Spinal, Lumbar, Medial Branch, 1 Level;  Surgeon: Evangelist Bose MD;  Location: [facility] OR;  Service: Pain Management;  Laterality: Bilateral;  L3,L4,L5    RADIOFREQUENCY ABLATION OF LUMBAR MEDIAL BRANCH NERVE AT SINGLE LEVEL Bilateral 11/23/2021    Procedure: Radiofrequency Ablation, Nerve, Spinal, Lumbar, Medial Branch, Bilateral L 3,4,5;  Surgeon: Nile Causey MD;  Location: [facility] OR;  Service: Pain Management;  Laterality: Bilateral;    ROTATOR CUFF REPAIR      Rt shoulder    Stents  8/18/2010    x 3    UPPER GASTROINTESTINAL ENDOSCOPY  02/2011       Review of patient's allergies indicates:   Allergen Reactions    Donepezil Other (See Comments)     AMS    Bactroban [mupirocin calcium] Blisters     Causes Blisters    Shellfish containing products Other (See Comments)     Other reaction(s): Gout  OYSTERS       Current Facility-Administered Medications on File Prior to Encounter   Medication    [COMPLETED] ceFEPIme (MAXIPIME) 2 g in dextrose 5 % in water (D5W) 5 % 50 mL IVPB (MB+)    lactated ringers infusion    [COMPLETED] morphine injection 2 mg    [COMPLETED] sodium chloride 0.9% bolus 1,000 mL 1,000 mL    [DISCONTINUED] 0.9%  NaCl infusion    [DISCONTINUED] acetaminophen tablet 650 mg    [DISCONTINUED] calcium gluconate 1 g in NS IVPB (premixed)     [DISCONTINUED] calcium gluconate 1 g in NS IVPB (premixed)    [DISCONTINUED] calcium gluconate 1 g in NS IVPB (premixed)    [DISCONTINUED] cefepime in dextrose 5 % 1 gram/50 mL IVPB 1 g    [DISCONTINUED] dextrose 50% injection 12.5 g    [DISCONTINUED] dextrose 50% injection 25 g    [DISCONTINUED] glucagon (human recombinant) injection 1 mg    [DISCONTINUED] glucose chewable tablet 16 g    [DISCONTINUED] glucose chewable tablet 24 g    [DISCONTINUED] magnesium sulfate 2g in water 50mL IVPB (premix)    [DISCONTINUED] magnesium sulfate 2g in water 50mL IVPB (premix)    [DISCONTINUED] melatonin tablet 6 mg    [DISCONTINUED] morphine injection 4 mg    [DISCONTINUED] NORepinephrine 4 mg in dextrose 5% 250 mL infusion (premix) (titrating)    [DISCONTINUED] ondansetron injection 4 mg    [DISCONTINUED] polyethylene glycol packet 17 g    [DISCONTINUED] potassium chloride 10 mEq in 100 mL IVPB    [DISCONTINUED] potassium chloride 10 mEq in 100 mL IVPB    [DISCONTINUED] potassium chloride 10 mEq in 100 mL IVPB    [DISCONTINUED] sodium chloride 0.9% bolus 1,000 mL 1,000 mL    [DISCONTINUED] sodium chloride 0.9% bolus 500 mL 500 mL    [DISCONTINUED] sodium hypochlorite (DAKIN'S) external solution 0.25%    [DISCONTINUED] sodium phosphate 15 mmol in dextrose 5 % (D5W) 250 mL IVPB    [DISCONTINUED] sodium phosphate 20.01 mmol in dextrose 5 % (D5W) 250 mL IVPB    [DISCONTINUED] sodium phosphate 30 mmol in dextrose 5 % (D5W) 250 mL IVPB    [DISCONTINUED] vancomycin (VANCOCIN) 2,000 mg in dextrose 5 % (D5W) 500 mL IVPB    [DISCONTINUED] vancomycin - pharmacy to dose    [COMPLETED] vancomycin 1,250 mg in dextrose 5 % (D5W) 250 mL IVPB (Vial-Mate)     Current Outpatient Medications on File Prior to Encounter   Medication Sig    allopurinoL (ZYLOPRIM) 100 MG tablet Take 1 tablet (100 mg total) by mouth every evening.    amiodarone (PACERONE) 200 MG Tab Take 1 tablet (200 mg total) by mouth 2 (two) times daily.     aspirin (ECOTRIN) 81 MG EC tablet Take 81 mg by mouth once daily.    atorvastatin (LIPITOR) 80 MG tablet Take 1 tablet (80 mg total) by mouth once daily.    calcitRIOL (ROCALTROL) 0.5 MCG Cap Take 1 capsule (0.5 mcg total) by mouth once daily.    clopidogreL (PLAVIX) 75 mg tablet Take 1 tablet (75 mg total) by mouth once daily.    doxycycline (VIBRA-TABS) 100 MG tablet Take 100 mg by mouth 2 (two) times daily.    famotidine (PEPCID) 40 MG tablet Take 1 tablet (40 mg total) by mouth once daily.    ferrous sulfate (FEROSUL) 325 mg (65 mg iron) Tab tablet TAKE 1 TABLET BY MOUTH TWICE DAILY    fluticasone propionate (FLONASE) 50 mcg/actuation nasal spray SHAKE LIQUID AND USE 2 SPRAYS(100 MCG) IN EACH NOSTRIL EVERY DAY    FOLIC ACID/MULTIVIT-MIN/LUTEIN (CENTRUM SILVER ORAL) Take 1 tablet by mouth once daily.    lisinopriL (PRINIVIL,ZESTRIL) 20 MG tablet Take 1 tablet (20 mg total) by mouth every evening.    magnesium oxide (MAG-OX) 400 mg (241.3 mg magnesium) tablet Take 1 tablet (400 mg total) by mouth once daily.    mecobal-levomefolat Ca-B6 phos (L-METHYL-B6-B12) 3-35-2 mg Tab Take 1 tablet by mouth 2 (two) times daily.    mirabegron (MYRBETRIQ) 50 mg Tb24 Take 1 tablet (50 mg total) by mouth once daily.    nitroGLYCERIN 0.4 MG/DOSE TL SPRY (NITROLINGUAL) 400 mcg/spray spray PLACE 1 SPRAY UNDER THE TONGUE EVERY 5 MINUTES AS NEEDED FOR CHEST PAIN    omeprazole (PRILOSEC) 20 MG capsule Take 1 capsule (20 mg total) by mouth once daily.    prothrombin time/INR test metr Misc 1 Stick by Misc.(Non-Drug; Combo Route) route every 30 days.    warfarin (COUMADIN) 5 MG tablet Take 1 tablet (5 mg total) by mouth Daily.    warfarin (COUMADIN) 5 MG tablet Take 1 tablet (5 mg total) by mouth Daily.     Family History       Problem Relation (Age of Onset)    Heart attack Mother, Father, Brother    Lupus Daughter    Ulcerative colitis Daughter (35)          Tobacco Use    Smoking status: Former    Smokeless  tobacco: Never    Tobacco comments:     45 yrs ago, only smoked 3-4 packs in his entire life as a teenager   Substance and Sexual Activity    Alcohol use: Not Currently     Comment: rare    Drug use: No    Sexual activity: Not Currently     Review of Systems   Reason unable to perform ROS: Sedated from medication.   Objective:     Vital Signs (Most Recent):  Pulse: (!) 57 (04/19/23 0217)  Resp: 17 (04/19/23 0217)  BP: 133/63 (04/19/23 0101)  SpO2: 100 % (04/19/23 0217)   Vital Signs (24h Range):  Temp:  [98.6 °F (37 °C)-98.7 °F (37.1 °C)] 98.7 °F (37.1 °C)  Pulse:  [40-86] 57  Resp:  [17-22] 17  SpO2:  [84 %-100 %] 100 %  BP: ()/(40-90) 133/63  Arterial Line BP: ()/(35-49) 135/39     Weight: 100.4 kg (221 lb 5.5 oz)  Body mass index is 33.65 kg/m².    Physical Exam  Vitals and nursing note reviewed.   Constitutional:       General: He is not in acute distress.     Appearance: He is well-developed.      Comments: Sedated from pain medication given in the ED  Diffuse bruising to face and extremities   Chronically ill appearing   HENT:      Head: Normocephalic and atraumatic.      Comments: Cervical collar is in place  Eyes:      Pupils: Pupils are equal, round, and reactive to light.   Cardiovascular:      Rate and Rhythm: Regular rhythm.      Heart sounds: No murmur heard.  Pulmonary:      Effort: Pulmonary effort is normal.      Breath sounds: Normal breath sounds. No wheezing.      Comments: O2 per NC  Abdominal:      General: There is no distension.      Palpations: Abdomen is soft.      Tenderness: There is no abdominal tenderness. There is no guarding or rebound.   Genitourinary:     Comments: Ruelas draining yellow urine   Musculoskeletal:         General: Normal range of motion.      Cervical back: Normal range of motion.   Skin:     Comments: Diffuse bruising  Multiple skin tears with bandages in place to UEs  BLE with ace bandages in place and heel protectors in place  Sacral decubitus noted  per pictures    Neurological:      Comments: Sedated due to pain medication          CRANIAL NERVES     CN III, IV, VI   Pupils are equal, round, and reactive to light.     Significant Labs: All pertinent labs within the past 24 hours have been reviewed.  CBC:   Recent Labs   Lab 04/18/23  1328   WBC 13.09*   HGB 9.1*   HCT 27.4*        CMP:   Recent Labs   Lab 04/18/23  1328      K 4.5      CO2 21*   *   BUN 63*   CREATININE 2.5*   CALCIUM 8.8   PROT 5.7*   ALBUMIN 1.9*   BILITOT 0.8   ALKPHOS 131   AST 65*   ALT 53*   ANIONGAP 9     Cardiac Markers: No results for input(s): CKMB, MYOGLOBIN, BNP, TROPISTAT in the last 48 hours.  Lactic Acid:   Recent Labs   Lab 04/18/23  1328   LACTATE 1.9     Troponin:   Recent Labs   Lab 04/18/23  1328   TROPONINI 0.101*     Urine Studies:   Recent Labs   Lab 04/18/23  1335   COLORU Yellow   APPEARANCEUA Clear   PHUR 6.0   SPECGRAV 1.015   PROTEINUA Negative   GLUCUA Negative   KETONESU Negative   BILIRUBINUA Negative   OCCULTUA Negative   NITRITE Negative   UROBILINOGEN Negative   LEUKOCYTESUR Negative       Significant Imaging: I have reviewed all pertinent imaging results/findings within the past 24 hours.  CT: I have reviewed all pertinent results/findings within the past 24 hours and my personal findings are:  CT head with no acute process  CXR: I have reviewed all pertinent results/findings within the past 24 hours and my personal findings are:  CXR with no consolidation or pulmonary edema     Assessment/Plan:     Active Hospital Problems    Diagnosis    *Septic shock    Pressure injury of sacral region, unstageable    Supratherapeutic INR    Anemia    Multiple skin tears    Closed nondisplaced fracture of sixth cervical vertebra    Dementia without behavioral disturbance    EMMY (acute kidney injury)    Chronic systolic congestive heart failure    Peripheral vascular disease    Paroxysmal atrial fibrillation     EKG stable  Stable on  Coreg, no missed doses of anticoagulation. Continue anticoagulation as described below      BPH with obstruction/lower urinary tract symptoms    Diabetic neuropathy    OA (osteoarthritis). post bilateral THR, 2001    Long term (current) use of anticoagulants     Risk vs benefit of anticoagulation medication discussed at length with patient and daughter.  With his history of MTHFR, a fib and hypercoagulability the benefit preventing of thrombus formation/stent reocclusion is difficult to balance with risk of bleeding.  That said, we will continue anticoagulation and reassess if further episodes of bleeding occur.   Monitor.      CKD (chronic kidney disease) stage 3, GFR 30-59 ml/min, 41    Carotid disease, bilateral    CAD (coronary artery disease)    Diabetes mellitus with chronic kidney disease, stage III    GERD (gastroesophageal reflux disease)    Hyperlipidemia associated with type 2 diabetes mellitus     Stable on Atorvastatin 80 mg QD  Last lipid panel excellent  As now      Hypertension associated with diabetes    MTHFR mutation (methylenetetrahydrofolate reductase)       Plan:    -Broad IV abx.  Soft tissue infection seems to be the most likely source as UA and CXR did not reveal an infectious source.  BCx are in progress.  -Levophed to maintain MAP greater than 65  -IVFs at 130ml/hr to complete fluid requirement given SHF Hx.  Last Echo 3/22/23 with EF 45%.  Monitoring volume status.   -ID consult  -CRP higher than prior check earlier this month.  Possible deeper infection/osteo from sacral decubitus? General Surgery consulted to evaluate for any surgical needs. May need further imaging when more hemodynamically stable.   -Trending LA.  First is normal.  -New EMMY. Due to hypotension?  From volume depletion?  IVFs.  Repeating AM labs.  If not improving, consideration for nephrology consult and broader workup.  Ruelas in place and is draining yellow urine.   -LA is 7.8.  Holding Coumadin, ASA,  Plavix.  No active bleeding. Hg is at baseline. Daily INR.  If an active bleed occurs, I will reverse with vitamin K.   -New transaminitis.  I suspect this could be hepatic ischemia from shock.  Trending.  -Troponin is elevated.  I suspect demand ischemia.  No reported chest pain.  Monitoring.  -Home anti hypertensives held while in shock.  -C collar is in place from prior cervical spine fusion.  -Wound care consult  -ISS. Accuchecks.  -Appears to be sensitive to narcotics.  Tylenol ordered for now.        VTE Risk Mitigation (From admission, onward)         Ordered     Place KURTIS hose  Until discontinued         04/19/23 0157     IP VTE HIGH RISK PATIENT  Once         04/19/23 0157                           Tyson Black MD  Department of Hospital Medicine  [facility]

## 2023-04-19 NOTE — PROGRESS NOTES
Pharmacokinetic Assessment Follow Up: IV Vancomycin    Vancomycin serum concentration assessment(s):    The random level was drawn correctly and can be used to guide therapy at this time. The measurement is below the desired definitive target range of 15 to 20 mcg/mL.    Vancomycin Regimen Plan:  Give 1250 mg x 1 dose and re-dose when the random level is less than 15 mcg/mL, next level to be drawn at 15:00 on 4/20/23    Drug levels (last 3 results):  Recent Labs   Lab Result Units 04/19/23  1405   Vancomycin, Random ug/mL 7.8       Pharmacy will continue to follow and monitor vancomycin.    Please contact pharmacy at extension [phone] for questions regarding this assessment.    Thank you for the consult,   Bernard Naidu       Patient brief summary:  Richard Bustos is a 77 y.o. male initiated on antimicrobial therapy with IV Vancomycin for treatment of sepsis    The patient's current regimen is daily dosing based on random levels     Drug Allergies:   Review of patient's allergies indicates:   Allergen Reactions    Donepezil Other (See Comments)     AMS    Bactroban [mupirocin calcium] Blisters     Causes Blisters    Shellfish containing products Other (See Comments)     Other reaction(s): Gout  OYSTERS       Actual Body Weight:   100.4 kg    Renal Function:   Estimated Creatinine Clearance: 33.3 mL/min (A) (based on SCr of 2.2 mg/dL (H)).,     Dialysis Method (if applicable):  N/A    CBC (last 72 hours):  Recent Labs   Lab Result Units 04/18/23  1328 04/19/23  0215 04/19/23  1118   WBC K/uL 13.09* 18.23* 13.09*   Hemoglobin g/dL 9.1* 8.1* 7.2*   Hemoglobin A1C %  --  5.4  --    Hematocrit % 27.4* 24.6* 21.9*   Platelets K/uL 306 345 297   Gran % % 84.0* 89.1* 89.1*   Lymph % % 8.0* 6.2* 6.2*   Mono % % 3.0* 3.2* 2.9*   Eosinophil % % 0.0 0.2 0.5   Basophil % % 1.0 0.2 0.2   Differential Method  Manual Automated Automated       Metabolic Panel (last 72 hours):  Recent Labs   Lab Result Units 04/18/23  1328 04/18/23  1335  04/19/23  0215 04/19/23  0940 04/19/23  1055   Sodium mmol/L 138  --  140 SEE COMMENT 139   Potassium mmol/L 4.5  --  4.0 SEE COMMENT 3.9   Chloride mmol/L 108  --  106 SEE COMMENT 105   CO2 mmol/L 21*  --  21* SEE COMMENT 20*   Glucose mg/dL 133*  --  212* SEE COMMENT 151*   Glucose, UA   --  Negative  --   --   --    BUN mg/dL 63*  --  64* SEE COMMENT 60*   Creatinine mg/dL 2.5*  --  2.4* SEE COMMENT 2.2*   Creatinine, Urine mg/dL  --  85.4  --   --   --    Albumin g/dL 1.9*  --  1.9* SEE COMMENT 1.7*   Total Bilirubin mg/dL 0.8  --  0.9 SEE COMMENT 0.8   Alkaline Phosphatase U/L 131  --  106 SEE COMMENT 97   AST U/L 65*  --  46* SEE COMMENT 38   ALT U/L 53*  --  42 SEE COMMENT 35   Magnesium mg/dL  --   --  2.0 SEE COMMENT 1.8   Phosphorus mg/dL  --   --  4.3 SEE COMMENT 3.6       Vancomycin Administrations:  vancomycin given in the last 96 hours                     vancomycin 1.5 g in dextrose 5 % 250 mL IVPB (ready to mix) (mg) 1,500 mg New Bag 04/19/23 1614    vancomycin 750 mg in dextrose 5 % 250 mL IVPB (ready to mix) (mg) 750 mg New Bag 04/19/23 0316    vancomycin 1,250 mg in dextrose 5 % (D5W) 250 mL IVPB (Vial-Mate) (mg) 1,250 mg New Bag 04/18/23 1545                    Microbiologic Results:  Microbiology Results (last 7 days)       Procedure Component Value Units Date/Time    Culture, Anaerobic [[id number]] Collected: 04/19/23 1239    Order Status: Sent Specimen: Wound from Sacrum Updated: 04/19/23 1318    Aerobic culture [[id number]] Collected: 04/19/23 1130    Order Status: Sent Specimen: Wound from Sacrum Updated: 04/19/23 1238    MRSA Screen by PCR [[id number]] Collected: 04/19/23 0940    Order Status: Completed Specimen: Nasopharyngeal Swab from Nasal Updated: 04/19/23 1153     MRSA SCREEN BY PCR Negative

## 2023-04-19 NOTE — PROGRESS NOTES
Pharmacist Renal Dose Adjustment Note    Richard Bustos is a 77 y.o. male being treated with the medication Cefepime    Patient Data:    Vital Signs (Most Recent):  Pulse: (!) 54 (04/19/23 0145)  Resp: (!) 21 (04/19/23 0145)  BP: 133/63 (04/19/23 0101)  SpO2: 100 % (04/19/23 0145)   Vital Signs (72h Range):  Temp:  [98.6 °F (37 °C)-98.7 °F (37.1 °C)]   Pulse:  [40-86]   Resp:  [17-22]   BP: ()/(40-90)   SpO2:  [84 %-100 %]   Arterial Line BP: ()/(35-49)      Recent Labs   Lab 04/18/23  1328   CREATININE 2.5*     Serum creatinine: 2.5 mg/dL (H) 04/18/23 1328  Estimated creatinine clearance: 29.3 mL/min (A)    Per [facility] renal dosing protocol:     Previous Order: Cefepime 1 g Q8H    Will be changed to:     New Order:Cefepime 1 g Q24H,    Due to: CrCl < 30 mL/min    Renal dose adjustments performed as noted above.    We will continue monitoring and adjusting as necessary.    Pharmacist: Nathaniel Santizo, PharmD  Ext: [phone]

## 2023-04-19 NOTE — CONSULTS
Consult Note  Infectious Disease    Reason for Consult:  Septic shock     HPI: Richard Bustos is a 77 y.o. male obese, BMI 33/6 Kg/m2, with past medical history of HTN, DM, CAD s/p MI and cardiac stents, AFib, MTHFR mutation on Coumadin, HLD, gout, ongoing dementia and prior fall on 3/17/23 which resulted in head trauma, and cervical fracture with cord edema status post cervical spine fusion on 03/24/2023 by Neurosurgery.  Patient was discharged to [facility] nursing [facility] [place] to continue recovery.  Unfortunately, since he has been bed-bound since his fall, he has developed unstageable sacrococcygeal wound.    Daughters at bedside providing most of the history, patient was found altered and hypotensive at facility yesterday, sent to [facility] ER for further workup.    Patient does not remember what happened yesterday, he is aware he is at the hospital in ICU, denies any fever or chills, no headache, no nausea or vomiting, no cough, no shortness of breath, no abdominal pain, no dysuria or increased urinary frequency, no changes in the color of the urine, reports having diarrhea a few days ago, had a tiny bowel movement earlier today.    At [facility], despite IV fluids, patient required Levophed, transferred to [facility] for ICU care.      Labs on admission with leukocytosis of 13, left shift 84%, bands 4%, H&H 9.1/27.4, , platelet count 306   INR 7.8--9.4, very high   EMMY, creatinine 2.5 (baseline 1.2)  .4 at [facility], high   Hemoglobin A1c 5.4   U tox negative   UA negative for UTI   Chest x-ray with hypoventilation, mild cardiomegaly.  No acute infiltrates.  CT head negative for acute pathology     Empirically started on vancomycin and cefepime IV.      ID consult for septic shock.    Upon chart reviewed, patient has history of non healing ulcer on L 2nd toe, completed 6 weeks of oral antibiotics with Augmentin for pan-sensitive Porteus mirabilis for possible osteomyelitis.       Review of patient's  allergies indicates:   Allergen Reactions    Donepezil Other (See Comments)     AMS    Bactroban [mupirocin calcium] Blisters     Causes Blisters    Shellfish containing products Other (See Comments)     Other reaction(s): Gout  OYSTERS     Past Medical History:   Diagnosis Date    Anemia     Anemia of other chronic disease 09/13/2017    Anemia, chronic renal failure 09/13/2017    Anemia, unspecified 09/13/2017    Anticoagulant long-term use     Aorta aneurysm     Arthritis     Atrial fibrillation     Bacteremia due to Streptococcus 01/08/2022    CAD (coronary artery disease)     CHF (congestive heart failure)     Chronic kidney disease     Clotting disorder 09/13/2017    Colon polyp     Dementia     Diabetes mellitus     not on meds    Diabetes mellitus type II     Diverticulosis     Elevated PSA     Encounter for blood transfusion     Former smoker     General anesthetics causing adverse effect in therapeutic use     TROUBLE COMING OUT OF ANESTHESIA WITH GASTRIC BYPASS    GERD (gastroesophageal reflux disease)     Gout     Hx of colonic polyps     Hypercoagulable state     Hyperlipidemia     Hypertension     MI, old 2010    MTHFR mutation     Myocardial infarction     9/2010    Osteomyelitis of ankle or foot 03/09/2017    Prostate cancer 06/2016    Sleep apnea     Squamous cell carcinoma 01/23/2018    Left helix, imiquimod    Venous stasis     Chronic     Past Surgical History:   Procedure Laterality Date    ABDOMINAL SURGERY      CARDIAC SURGERY      3 STENTS     CAUDAL EPIDURAL STEROID INJECTION N/A 6/22/2020    Procedure: INJECTION, STEROID, SPINE, EPIDURAL, CAUDAL;  Surgeon: Evangelist Bose MD;  Location: [facility] OR;  Service: Pain Management;  Laterality: N/A;    COLONOSCOPY  02/2011    diverticulosis with diverticula with clot, no active bleeding    COLONOSCOPY N/A 8/24/2016    Procedure: COLONOSCOPY;  Surgeon: Saroj Borjas MD;  Location: [facility];  Service:  Endoscopy;  Laterality: N/A;    COLONOSCOPY N/A 11/18/2020    Procedure: COLONOSCOPY;  Surgeon: Saroj Borjas MD;  Location: [facility] ENDO;  Service: Endoscopy;  Laterality: N/A;    COLONOSCOPY N/A 10/27/2021    Procedure: COLONOSCOPY;  Surgeon: Sarjo Borjas MD;  Location: [facility] ENDO;  Service: Endoscopy;  Laterality: N/A;    EPIDURAL STEROID INJECTION INTO LUMBAR SPINE N/A 6/22/2020    Procedure: Injection-steroid-epidural-lumbar;  Surgeon: Evangelist Bose MD;  Location: [facility] OR;  Service: Pain Management;  Laterality: N/A;  L3 L4 L5 S1    EPIDURAL STEROID INJECTION INTO LUMBAR SPINE N/A 9/21/2020    Procedure: Injection-steroid-epidural-lumbar;  Surgeon: Evangelist Bose MD;  Location: [facility] OR;  Service: Pain Management;  Laterality: N/A;  L5-S1    FUSION, SPINE, CERVICAL N/A 3/24/2023    Procedure: FUSION, SPINE, CERVICAL;  Surgeon: Kike Kc DO;  Location: [facility] OR;  Service: Neurosurgery;  Laterality: N/A;  posterior cervical decompression/fusion C3-T1    GASTRIC BYPASS  2/5/2008    IVC FILTER RETRIEVAL      JOINT REPLACEMENT  1996 and 2001    bi-lat hip replacement/Rt Hip and Lt Hip    RADIOFREQUENCY ABLATION OF LUMBAR MEDIAL BRANCH NERVE AT SINGLE LEVEL Bilateral 1/10/2020    Procedure: Radiofrequency Ablation, Nerve, Spinal, Lumbar, Medial Branch, 1 Level;  Surgeon: Evangelist Bose MD;  Location: [facility] OR;  Service: Pain Management;  Laterality: Bilateral;  L3,L4,L5    RADIOFREQUENCY ABLATION OF LUMBAR MEDIAL BRANCH NERVE AT SINGLE LEVEL Bilateral 11/23/2021    Procedure: Radiofrequency Ablation, Nerve, Spinal, Lumbar, Medial Branch, Bilateral L 3,4,5;  Surgeon: Nile Causey MD;  Location: [facility] OR;  Service: Pain Management;  Laterality: Bilateral;    ROTATOR CUFF REPAIR      Rt shoulder    Stents  8/18/2010    x 3    UPPER GASTROINTESTINAL ENDOSCOPY  02/2011     Social History     Tobacco Use    Smoking status: Former    Smokeless tobacco: Never    Tobacco comments:     45 yrs ago,  only smoked 3-4 packs in his entire life as a teenager   Substance Use Topics    Alcohol use: Not Currently     Comment: rare        Family History   Problem Relation Age of Onset    Heart attack Mother     Heart attack Father     Heart attack Brother     Ulcerative colitis Daughter 35    Lupus Daughter     Colon cancer Neg Hx     Colon polyps Neg Hx     Crohn's disease Neg Hx     Melanoma Neg Hx     Psoriasis Neg Hx     Eczema Neg Hx        Pertinent medications noted: warfarin     Review of Systems:   No chills, fever, sweats, weight loss  No change in vision, loss of vision or diplopia  No sinus congestion, purulent nasal discharge, post nasal drip or facial pain  No pain in mouth or throat. No problems with teeth, gums.  No chest pain, palpitations, syncope  No cough, sputum production, shortness of breath, dyspnea on exertion, pleurisy, hemoptysis  No dysphagia, odynophagia  No nausea, vomiting, diarrhea, constipation, blood in stool, or focal abd pain  No dysuria, hesitancy, hematuria, retention, incontinence  No swelling of joints, redness of joints, injuries, or new focal pain  No unusual headaches, dizziness, vertigo,prior fall 3/17/23  No anxiety, depression, substance abuse, sleep disturbance  No bleeding, lymphadenopathy  S/p fall with multiple ecchymosis on face/forehead, arms and legs     Outdoor activities: Resident of [facility], former smoker, no alcohol, 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 Alert  and attentive, cooperative, comfortable on room air  Eyes:  Anicteric, PERRL, resolving ecchymosis on eyelids b/l  ENT:  Dry oral mucosa, terrible oral hygiene, missing most of his upper teeth, has 3 lower teeth left, no ulcers, exudates, thrush, nares patent  Neck:  Neck brace in place, supple  Lungs: Clear to auscultation b/l  Heart:  S1/S2+, regular rhythm, no murmurs  Abd:  Obese, RLQ firm mass palpated, non tender to palpation, +BS, soft, non tender, non distended, no rebound  :  Ruelas, urine clear  Musc:  Except for R great toe, joints without effusion, swelling,  erythema, synovitis, non-ambulatory  Skin:  Warm, resolving ecchymosis on forehead, face, upper extremities and shins  Wounds: Sacrococcygeal unstageable foul-smelling wound, R great toe with 2 non-healing ulcers, with surrounding redness c/w cellulitis    L foot with lateral unsteageable wound  Neuro:  Following commands, speech is clear, does not remember what happened yesterday, RUE 5/5, LUE 3/5, RLE 4/5, LLE 4/5  Psych:  Calm, cooperative  Lymphatic:       Extrem: B/l legs with dressing in placed, not disturbed   VAD:  R femoral line 4/18    R a-line 4/18    L peripheral IV      Isolation: None    4/19:      Sacrum             Sacrum                  Resolving ecchymosis face      R great toe  L lateral foot - unstageable wound    General Labs reviewed:  Recent Labs   Lab 04/18/23  1328 04/19/23  0215   WBC 13.09* 18.23*   HGB 9.1* 8.1*   HCT 27.4* 24.6*    345       Recent Labs   Lab 04/18/23  1328 04/19/23  0215    140   K 4.5 4.0    106   CO2 21* 21*   BUN 63* 64*   CREATININE 2.5* 2.4*   CALCIUM 8.8 8.3*   PROT 5.7* 5.4*   BILITOT 0.8 0.9   ALKPHOS 131 106   ALT 53* 42   AST 65* 46*     Recent Labs   Lab 04/18/23  1328   .4*     No results for input(s): SEDRATE in the last 168 hours.    Estimated Creatinine Clearance: 30.5 mL/min (A) (based on SCr of 2.4 mg/dL (H)).       Prior Micro:   Wound cultures left foot  10/18/2022 pansensitive Proteus mirabilis  Wound cultures left foot 08/24/2022 pansensitive Proteus mirabilis, and Porphyromonas somerae    Micro:  Blood cultures x 2 4/18 at [facility] no growth to date      Imaging Reviewed:  CXR  CT head     Cardiology:       IMPRESSION & PLAN     Septic shock in the setting of unstageable foul-smelling sacrococcygeal ulcer   at [facility], high  Blood cultures x 2 no growth to date, pending final     2. Rule out peripheral vascular disease, b/l feet non healing ulcers, rule out osteomyelitis     3. EMMY, cr 2.5, baseline 1.2    4. Supratherapeutic INR, 9.4 - on coumadin     5. PMHx: HTN, DM, CAD s/p MI and cardiac stents, AFib, MTHFR mutation on Coumadin, HLD, gout, ongoing dementia      Recommendations:  Adequate source control  Surgery for I&D and washout of sacral decub ulcer; please send deep tissue for Gram stain and cultures  LE arterial US ordered  R foot x-ray  Stop cefepime IV  Start Meropenem 1g IV q12h, dose as per crcl  Continue Vancomycin IV, keep level 15-20  Consider Podiatry eval for b/l non-healing chronic ulcers  Follow cultures   Aspiration precautions   Wound care to all affected areas   He would likely benefit from colostomy to prevent further infections of sacrococcygeal wound since he is non-ambulatory     Will follow     D/w patient, daughters at bedside, nursing, Dr Castillo     Medical Decision Making during this encounter was  [_] Low Complexity  [_] Moderate Complexity  [xx] High Complexity

## 2023-04-19 NOTE — CARE UPDATE
04/18/23 2051   Patient Assessment/Suction   Level of Consciousness (AVPU) responds to pain   Respiratory Effort Unlabored;Shallow   PRE-TX-O2   Device (Oxygen Therapy) nasal cannula   $ Is the patient on Low Flow Oxygen? Yes   Flow (L/min) 3   SpO2 100 %   Pulse Oximetry Type Continuous   $ Pulse Oximetry - Multiple Charge Pulse Oximetry - Multiple   Pulse 61   Arterial Line 04/18/23 2058 Right Radial   Placement Date/Time: 04/18/23 2058   Present Prior to Hospital Arrival?: No  Size: 20  Orientation: Right  Location: Radial  Placement directed by: Anatomic Landmarks  Site Prep: Alcohol;Chlorhexidine   Local Anesthetic: None  Inserted by: Respiratory...   Site Assessment Clean;Dry;Intact;No redness;No swelling   Line Status Pulsatile blood flow   Art Line Waveform Appropriate   Arterial Line Interventions Zeroed and calibrated;Leveled;Connections checked and tightened   Color/Movement/Sensation Capillary refill less than 3 sec   Dressing Status Intact;Dry;Clean   Dressing Intervention First dressing   Labs   $ Was an ABG obtained? A Line Insertion   $ Labs Tech Time 30 min

## 2023-04-19 NOTE — TELEPHONE ENCOUNTER
----- Message from Fabio Luis sent at 4/19/2023  8:17 AM CDT -----      Name of Who is Calling:Portia          What is the request in detail:Portia is requesting a call back to discuss concern about possible infection in the PT.          Can the clinic reply by [facility]:no          What Number to Call Back if not in [phone]

## 2023-04-19 NOTE — ED NOTES
Assumed care of pt from ARTEM Pérez RN at this time. MD at BS placing CVC, levo to be started after verification of placement. Pt remains on CCM, hypotensive. WCSHEREEN.

## 2023-04-19 NOTE — PROGRESS NOTES
Admitted in transfer overnight for septic shock. Continue broad abx. Appreciate Dr Ocasio's recommendations. Surgery consult, podiatry consult. INR elevated. Giving Vitamin K. Follow up INR in process and give additional vitamin K / FFP if needed. Continue management of sepsis

## 2023-04-19 NOTE — PROGRESS NOTES
[facility]  Adult Nutrition   Consult Note (Initial Assessment)     SUMMARY     Recommendations  Recommendation/Intervention:   1. Continue current modified diet as tolerated; suggest SLP evaluation once pt more alert.   2. Recommend Ensure HP pudding with meals and Landon BID, thickened to [place] for wound healing needs.   3. Suggest MVI with minerals for general health.   4. Consider adding Severe PCM to problem list.  5. RD to follow intake, tolerance, and labs as needed.    Goals: 1. Intake to be >/= 75% EEN. 2. Labs trend to target range. 3. No further skin issues.  Nutrition Goal Status: new    Dietitian Rounds Brief  Consult for wounds. Patient is 77 yr old obese SNF resident admit for septic shock requiring pressors, EMMY , encephalopathy  with necrotic sacral ulcer--un-staged. Patient was with xray, then wound MD at visit attempt. Family upset, at the bedside. RD to add Ensure Pudding HP and Landon BID for healing. Unable to assess for malnutrition at this time. MST screen 3, patient/family unsure. Intake /appetite decreased per RN screen also. Weight loss significant per ASPEN (10%, 35 lbs in 3 months). Last documented BM 4/5/23? RD to follow for intake, labs, and bowel function PRN.    Severe Protein-Calorie Malnutrition in the context of chronic illness (dementia), related to inadequate protein-energy intake with increased protein-energy needs as evidenced by decreased appetite/intake (</+ 50% in >/= 5 days (history of dementia, un-staged decubitus ulcer, reported poor appetite); Significant severe weight loss per ASPEN criteria (10% weight loss in 3 months / Epic).    Diet order:   Current Diet Order: Dysphagia Mechanical Soft (IDDSI Level 5); Nectar Thick Liquids         Evaluation of Received Nutrient/Fluid Intake  Energy Calories Required: not meeting needs  Protein Required: not meeting needs  Fluid Required: not meeting needs  Tolerance: tolerating     % Intake of Estimated Energy  "Needs: 0 - 25 %  % Meal Intake: 0 - 25 %    Intake/Output Summary (Last 24 hours) at 4/19/2023 1451  Last data filed at 4/19/2023 1101  Gross per 24 hour   Intake 1378.51 ml   Output 1455 ml   Net -76.49 ml      Anthropometrics  Temp: 99 °F (37.2 °C)  Height Method: Estimated  Height: 5' 8" (172.7 cm)  Height (inches): 68 in  Weight Method: Bed Scale  Weight: 100.4 kg (221 lb 5.5 oz)  Weight (lb): 221.34 lb  Ideal Body Weight (IBW), Male: 154 lb  % Ideal Body Weight, Male (lb): 143.73 %  BMI (Calculated): 33.7       Estimated/Assessed Needs  Weight Used For Calorie Calculations: 100 kg (220 lb 7.4 oz)  Energy Calorie Requirements (kcal): 7224-5146  Energy Need Method: Kcal/kg  Protein Requirements: 105-140 (1.5-2.0 / kg IBW; 1.0-1.4 CBW)  Weight Used For Protein Calculations: 70 kg (154 lb 5.2 oz) (IBW)  Fluid Requirements (mL): 2000 mL (20 mL/kg)  Estimated Fluid Requirement Method: RDA Method  RDA Method (mL): 2000       Reason for Assessment  Reason For Assessment: consult  Diagnosis: infection/sepsis  Relevant Medical History: HTN, CAD s/p cardiac stents, lumbar DDD with chronic bilat LE weakness, HLD, gout, MI, Chronic SHF, arthritis, A-fib on Coumadin, DM II, dementia, and MTHFR mutation, and large non-healing sacral wound    Nutrition/Diet History  Food Allergies: shellfish  Factors Affecting Nutritional Intake: difficulty/impaired swallowing, impaired cognitive status/motor control    Nutrition Risk Screen  Nutrition Risk Screen: large or nonhealing wound, burn or pressure injury     MST Score: 3  Have you recently lost weight without trying?: Unsure  Weight loss score: 2  Have you been eating poorly because of a decreased appetite?: Yes  Appetite score: 1       Weight History:  Wt Readings from Last 5 Encounters:   04/19/23 100.4 kg (221 lb 5.5 oz)   04/18/23 104.3 kg (230 lb)   04/03/23 104 kg (229 lb 4.5 oz)   03/26/23 112 kg (246 lb 14.6 oz)   03/23/23 111.1 kg (244 lb 14.9 oz)    "     Lab/Procedures/Meds: Pertinent Labs/Meds Reviewed    Medications:Pertinent Medications Reviewed  Scheduled Meds:   chlorhexidine  15 mL Mouth/Throat BID    meropenem (MERREM) IVPB  1 g Intravenous Q12H     Continuous Infusions:   sodium chloride 0.9% 130 mL/hr at 04/19/23 0316    NORepinephrine bitartrate-D5W 0.22 mcg/kg/min (04/19/23 0928)    NORepinephrine bitartrate-D5W 0.24 mcg/kg/min (04/19/23 0900)     PRN Meds:.acetaminophen, calcium gluconate IVPB, calcium gluconate IVPB, calcium gluconate IVPB, dextrose 50%, dextrose 50%, glucagon (human recombinant), glucose, glucose, insulin aspart U-100, magnesium sulfate IVPB, magnesium sulfate IVPB, melatonin, ondansetron, polyethylene glycol, potassium chloride **AND** potassium chloride **AND** potassium chloride, sodium phosphate IVPB, sodium phosphate IVPB, sodium phosphate IVPB, Pharmacy to dose Vancomycin consult **AND** vancomycin - pharmacy to dose    Labs: Pertinent Labs Reviewed  Clinical Chemistry:  Recent Labs   Lab 04/19/23  0215 04/19/23  0940 04/19/23  1055    SEE COMMENT 139   K 4.0 SEE COMMENT 3.9    SEE COMMENT 105   CO2 21* SEE COMMENT 20*   * SEE COMMENT 151*   BUN 64* SEE COMMENT 60*   CREATININE 2.4* SEE COMMENT 2.2*   CALCIUM 8.3* SEE COMMENT 8.1*   PROT 5.4* SEE COMMENT 4.8*   ALBUMIN 1.9* SEE COMMENT 1.7*   BILITOT 0.9 SEE COMMENT 0.8   ALKPHOS 106 SEE COMMENT 97   AST 46* SEE COMMENT 38   ALT 42 SEE COMMENT 35   ANIONGAP 13 SEE COMMENT 14   MG 2.0 SEE COMMENT 1.8   PHOS 4.3 SEE COMMENT 3.6     CBC:   Recent Labs   Lab 04/19/23  1118   WBC 13.09*   RBC 2.16*   HGB 7.2*   HCT 21.9*      *   MCH 33.3*   MCHC 32.9     Lipid Panel:  No results for input(s): CHOL, HDL, LDLCALC, TRIG, CHOLHDL in the last 168 hours.  Cardiac Profile:  Recent Labs   Lab 04/18/23  1328   TROPONINI 0.101*     Inflammatory Labs:  Recent Labs   Lab 04/18/23  1328   .4*     Diabetes:  Recent Labs   Lab 04/19/23  0215   HGBA1C 5.4      Thyroid & Parathyroid:  No results for input(s): TSH, FREET4, B7ONFLS, M6DLQLQ, THYROIDAB in the last 168 hours.    Monitor and Evaluation  Food and Nutrient Intake: energy intake, food and beverage intake  Food and Nutrient Adminstration: diet order  Knowledge/Beliefs/Attitudes: food and nutrition knowledge/skill  Physical Activity and Function: nutrition-related ADLs and IADLs  Anthropometric Measurements: weight change, body mass index, weight  Biochemical Data, Medical Tests and Procedures: electrolyte and renal panel, gastrointestinal profile, glucose/endocrine profile, inflammatory profile, lipid profile  Nutrition-Focused Physical Findings: overall appearance     Nutrition Risk  Level of Risk/Frequency of Follow-up: high     Nutrition Follow-Up  RD Follow-up?: Yes      Carrie Butcher RD, LDN 04/19/2023 2:51 PM

## 2023-04-19 NOTE — PLAN OF CARE
Safety precautions remain in effect. Patient unable to use call light.  Ruelas draining clear yellow urine.  On Levophed to maintain MAP >65.  Doing daily wound care, dressings dry and intact.

## 2023-04-19 NOTE — PLAN OF CARE
Problem: Malnutrition  Goal: Improved Nutritional Intake  Intervention: Promote and Optimize Oral Intake  Flowsheets (Taken 4/19/2023 1523)  Oral Nutrition Promotion:   calorie-dense liquids provided: Landon BID for wound healing   calorie-dense foods provided: Ensure High Protein Pudding.  Recommend adding MVI with minerals

## 2023-04-19 NOTE — PLAN OF CARE
04/19/23 0217   Patient Assessment/Suction   Respiratory Effort Unlabored   PRE-TX-O2   Device (Oxygen Therapy) nasal cannula   $ Is the patient on Low Flow Oxygen? Yes   Flow (L/min) 2   SpO2 100 %   Pulse Oximetry Type Continuous   $ Pulse Oximetry - Multiple Charge Pulse Oximetry - Multiple   Pulse (!) 57   Resp 17   Respiratory Evaluation   $ Care Plan Tech Time 15 min

## 2023-04-19 NOTE — PROGRESS NOTES
Pharmacokinetic Initial Assessment: IV Vancomycin    Assessment/Plan:    Initiate intravenous vancomycin with loading dose of 2000 (1250 mg given at [facility], 750 mg additional here at [facility]) mg once with subsequent doses when random concentrations are less than 20 mcg/mL  Desired empiric serum trough concentration is 10 to 20 mcg/mL  Draw vancomycin random level on 04/19 at 1500.  Pharmacy will continue to follow and monitor vancomycin.      Please contact pharmacy at extension [phone] with any questions regarding this assessment.     Thank you for the consult,   Nathaniel Santizo       Patient brief summary:  Richard Bustos is a 77 y.o. male initiated on antimicrobial therapy with IV Vancomycin for treatment of suspected sepsis    Drug Allergies:   Review of patient's allergies indicates:   Allergen Reactions    Donepezil Other (See Comments)     AMS    Bactroban [mupirocin calcium] Blisters     Causes Blisters    Shellfish containing products Other (See Comments)     Other reaction(s): Gout  OYSTERS       Actual Body Weight:   100.4 kg    Renal Function:   Estimated Creatinine Clearance: 29.3 mL/min (A) (based on SCr of 2.5 mg/dL (H)).,     CBC (last 72 hours):  Recent Labs   Lab Result Units 04/18/23  1328   WBC K/uL 13.09*   Hemoglobin g/dL 9.1*   Hematocrit % 27.4*   Platelets K/uL 306   Gran % % 84.0*   Lymph % % 8.0*   Mono % % 3.0*   Eosinophil % % 0.0   Basophil % % 1.0   Differential Method  Manual       Metabolic Panel (last 72 hours):  Recent Labs   Lab Result Units 04/18/23  1328 04/18/23  1335   Sodium mmol/L 138  --    Potassium mmol/L 4.5  --    Chloride mmol/L 108  --    CO2 mmol/L 21*  --    Glucose mg/dL 133*  --    Glucose, UA   --  Negative   BUN mg/dL 63*  --    Creatinine mg/dL 2.5*  --    Creatinine, Urine mg/dL  --  85.4   Albumin g/dL 1.9*  --    Total Bilirubin mg/dL 0.8  --    Alkaline Phosphatase U/L 131  --    AST U/L 65*  --    ALT U/L 53*  --        Drug levels (last 3 results):  No results for  input(s): VANCOMYCINRA, VANCORANDOM, VANCOMYCINPE, VANCOPEAK, VANCOMYCINTR, VANCOTROUGH in the last 72 hours.    Microbiologic Results:  Microbiology Results (last 7 days)       ** No results found for the last 168 hours. **

## 2023-04-19 NOTE — ED NOTES
Pt's daughter, Citlali, provided with update on transfer and room assignment at this time. Daughter verbalized understanding.

## 2023-04-20 ENCOUNTER — ANESTHESIA EVENT (OUTPATIENT)
Dept: SURGERY | Facility: HOSPITAL | Age: 78
DRG: 853 | End: 2023-04-20
Payer: MEDICARE

## 2023-04-20 ENCOUNTER — ANESTHESIA (OUTPATIENT)
Dept: SURGERY | Facility: HOSPITAL | Age: 78
DRG: 853 | End: 2023-04-20
Payer: MEDICARE

## 2023-04-20 LAB
ALBUMIN SERPL BCP-MCNC: 1.8 G/DL (ref 3.5–5.2)
ALP SERPL-CCNC: 299 U/L (ref 55–135)
ALT SERPL W/O P-5'-P-CCNC: 192 U/L (ref 10–44)
ANION GAP SERPL CALC-SCNC: 10 MMOL/L (ref 8–16)
AST SERPL-CCNC: 357 U/L (ref 10–40)
BASOPHILS # BLD AUTO: 0.02 K/UL (ref 0–0.2)
BASOPHILS NFR BLD: 0.1 % (ref 0–1.9)
BILIRUB SERPL-MCNC: 1.2 MG/DL (ref 0.1–1)
BUN SERPL-MCNC: 55 MG/DL (ref 8–23)
CALCIUM SERPL-MCNC: 8.4 MG/DL (ref 8.7–10.5)
CHLORIDE SERPL-SCNC: 112 MMOL/L (ref 95–110)
CO2 SERPL-SCNC: 19 MMOL/L (ref 23–29)
CREAT SERPL-MCNC: 2 MG/DL (ref 0.5–1.4)
DIFFERENTIAL METHOD: ABNORMAL
EOSINOPHIL # BLD AUTO: 0.1 K/UL (ref 0–0.5)
EOSINOPHIL NFR BLD: 0.4 % (ref 0–8)
ERYTHROCYTE [DISTWIDTH] IN BLOOD BY AUTOMATED COUNT: 14.7 % (ref 11.5–14.5)
EST. GFR  (NO RACE VARIABLE): 34.2 ML/MIN/1.73 M^2
GLUCOSE SERPL-MCNC: 107 MG/DL (ref 70–110)
GLUCOSE SERPL-MCNC: 110 MG/DL (ref 70–110)
GLUCOSE SERPL-MCNC: 111 MG/DL (ref 70–110)
GLUCOSE SERPL-MCNC: 172 MG/DL (ref 70–110)
HCT VFR BLD AUTO: 20.9 % (ref 40–54)
HGB BLD-MCNC: 7.2 G/DL (ref 14–18)
IMM GRANULOCYTES # BLD AUTO: 0.32 K/UL (ref 0–0.04)
IMM GRANULOCYTES NFR BLD AUTO: 2.1 % (ref 0–0.5)
INR PPP: 1.3 (ref 0.8–1.2)
INR PPP: 1.3 (ref 0.8–1.2)
INR PPP: 1.4 (ref 0.8–1.2)
INR PPP: 1.4 (ref 0.8–1.2)
INR PPP: 1.6 (ref 0.8–1.2)
LYMPHOCYTES # BLD AUTO: 0.7 K/UL (ref 1–4.8)
LYMPHOCYTES NFR BLD: 4.4 % (ref 18–48)
MAGNESIUM SERPL-MCNC: 1.7 MG/DL (ref 1.6–2.6)
MCH RBC QN AUTO: 33.8 PG (ref 27–31)
MCHC RBC AUTO-ENTMCNC: 34.4 G/DL (ref 32–36)
MCV RBC AUTO: 98 FL (ref 82–98)
MONOCYTES # BLD AUTO: 0.3 K/UL (ref 0.3–1)
MONOCYTES NFR BLD: 2.2 % (ref 4–15)
NEUTROPHILS # BLD AUTO: 14.1 K/UL (ref 1.8–7.7)
NEUTROPHILS NFR BLD: 90.8 % (ref 38–73)
NRBC BLD-RTO: 0 /100 WBC
PHOSPHATE SERPL-MCNC: 3.4 MG/DL (ref 2.7–4.5)
PLATELET # BLD AUTO: 275 K/UL (ref 150–450)
PMV BLD AUTO: 10.6 FL (ref 9.2–12.9)
POTASSIUM SERPL-SCNC: 3.7 MMOL/L (ref 3.5–5.1)
PROT SERPL-MCNC: 4.9 G/DL (ref 6–8.4)
PROTHROMBIN TIME: 13.3 SEC (ref 9–12.5)
PROTHROMBIN TIME: 13.9 SEC (ref 9–12.5)
PROTHROMBIN TIME: 14.4 SEC (ref 9–12.5)
PROTHROMBIN TIME: 14.4 SEC (ref 9–12.5)
PROTHROMBIN TIME: 16.9 SEC (ref 9–12.5)
RBC # BLD AUTO: 2.13 M/UL (ref 4.6–6.2)
SODIUM SERPL-SCNC: 141 MMOL/L (ref 136–145)
VANCOMYCIN SERPL-MCNC: 16.1 UG/ML
WBC # BLD AUTO: 15.52 K/UL (ref 3.9–12.7)

## 2023-04-20 PROCEDURE — 36000707: Performed by: SURGERY

## 2023-04-20 PROCEDURE — 87206 SMEAR FLUORESCENT/ACID STAI: CPT | Performed by: SURGERY

## 2023-04-20 PROCEDURE — 99233 PR SUBSEQUENT HOSPITAL CARE,LEVL III: ICD-10-PCS | Mod: ,,, | Performed by: STUDENT IN AN ORGANIZED HEALTH CARE EDUCATION/TRAINING PROGRAM

## 2023-04-20 PROCEDURE — 63600175 PHARM REV CODE 636 W HCPCS: Performed by: INTERNAL MEDICINE

## 2023-04-20 PROCEDURE — 99900031 HC PATIENT EDUCATION (STAT)

## 2023-04-20 PROCEDURE — 87205 SMEAR GRAM STAIN: CPT | Performed by: SURGERY

## 2023-04-20 PROCEDURE — 25000003 PHARM REV CODE 250: Performed by: SURGERY

## 2023-04-20 PROCEDURE — 87077 CULTURE AEROBIC IDENTIFY: CPT | Performed by: SURGERY

## 2023-04-20 PROCEDURE — 94761 N-INVAS EAR/PLS OXIMETRY MLT: CPT

## 2023-04-20 PROCEDURE — 99233 SBSQ HOSP IP/OBS HIGH 50: CPT | Mod: ,,, | Performed by: STUDENT IN AN ORGANIZED HEALTH CARE EDUCATION/TRAINING PROGRAM

## 2023-04-20 PROCEDURE — 94799 UNLISTED PULMONARY SVC/PX: CPT

## 2023-04-20 PROCEDURE — 83735 ASSAY OF MAGNESIUM: CPT | Performed by: INTERNAL MEDICINE

## 2023-04-20 PROCEDURE — D9220A PRA ANESTHESIA: ICD-10-PCS | Mod: ANES,,, | Performed by: ANESTHESIOLOGY

## 2023-04-20 PROCEDURE — 85610 PROTHROMBIN TIME: CPT | Mod: 91 | Performed by: STUDENT IN AN ORGANIZED HEALTH CARE EDUCATION/TRAINING PROGRAM

## 2023-04-20 PROCEDURE — 11046 PR DEB MUSC/FASCIA ADD-ON: ICD-10-PCS | Mod: ,,, | Performed by: SURGERY

## 2023-04-20 PROCEDURE — 20000000 HC ICU ROOM

## 2023-04-20 PROCEDURE — 80053 COMPREHEN METABOLIC PANEL: CPT | Performed by: INTERNAL MEDICINE

## 2023-04-20 PROCEDURE — D9220A PRA ANESTHESIA: Mod: ANES,,, | Performed by: ANESTHESIOLOGY

## 2023-04-20 PROCEDURE — 87186 SC STD MICRODIL/AGAR DIL: CPT | Performed by: SURGERY

## 2023-04-20 PROCEDURE — 25000003 PHARM REV CODE 250: Performed by: STUDENT IN AN ORGANIZED HEALTH CARE EDUCATION/TRAINING PROGRAM

## 2023-04-20 PROCEDURE — 25000003 PHARM REV CODE 250: Performed by: NURSE ANESTHETIST, CERTIFIED REGISTERED

## 2023-04-20 PROCEDURE — 63600175 PHARM REV CODE 636 W HCPCS: Performed by: NURSE ANESTHETIST, CERTIFIED REGISTERED

## 2023-04-20 PROCEDURE — 87075 CULTR BACTERIA EXCEPT BLOOD: CPT | Performed by: SURGERY

## 2023-04-20 PROCEDURE — 25000003 PHARM REV CODE 250

## 2023-04-20 PROCEDURE — 11046 DBRDMT MUSC&/FSCA EA ADDL: CPT | Mod: ,,, | Performed by: SURGERY

## 2023-04-20 PROCEDURE — 87070 CULTURE OTHR SPECIMN AEROBIC: CPT | Performed by: SURGERY

## 2023-04-20 PROCEDURE — 85610 PROTHROMBIN TIME: CPT | Mod: 91 | Performed by: INTERNAL MEDICINE

## 2023-04-20 PROCEDURE — 85025 COMPLETE CBC W/AUTO DIFF WBC: CPT | Performed by: INTERNAL MEDICINE

## 2023-04-20 PROCEDURE — 63600175 PHARM REV CODE 636 W HCPCS: Performed by: STUDENT IN AN ORGANIZED HEALTH CARE EDUCATION/TRAINING PROGRAM

## 2023-04-20 PROCEDURE — 37000009 HC ANESTHESIA EA ADD 15 MINS: Performed by: SURGERY

## 2023-04-20 PROCEDURE — 11043 DBRDMT MUSC&/FSCA 1ST 20/<: CPT | Mod: ,,, | Performed by: SURGERY

## 2023-04-20 PROCEDURE — 99900035 HC TECH TIME PER 15 MIN (STAT)

## 2023-04-20 PROCEDURE — 37000008 HC ANESTHESIA 1ST 15 MINUTES: Performed by: SURGERY

## 2023-04-20 PROCEDURE — 87102 FUNGUS ISOLATION CULTURE: CPT | Performed by: SURGERY

## 2023-04-20 PROCEDURE — 80202 ASSAY OF VANCOMYCIN: CPT | Performed by: STUDENT IN AN ORGANIZED HEALTH CARE EDUCATION/TRAINING PROGRAM

## 2023-04-20 PROCEDURE — 25000003 PHARM REV CODE 250: Performed by: INTERNAL MEDICINE

## 2023-04-20 PROCEDURE — 36000706: Performed by: SURGERY

## 2023-04-20 PROCEDURE — D9220A PRA ANESTHESIA: Mod: CRNA,,, | Performed by: NURSE ANESTHETIST, CERTIFIED REGISTERED

## 2023-04-20 PROCEDURE — D9220A PRA ANESTHESIA: ICD-10-PCS | Mod: CRNA,,, | Performed by: NURSE ANESTHETIST, CERTIFIED REGISTERED

## 2023-04-20 PROCEDURE — 63600175 PHARM REV CODE 636 W HCPCS: Performed by: ANESTHESIOLOGY

## 2023-04-20 PROCEDURE — 11043 PR DEBRIDEMENT, SKIN, SUB-Q TISSUE,MUSCLE,=<20 SQ CM: ICD-10-PCS | Mod: ,,, | Performed by: SURGERY

## 2023-04-20 PROCEDURE — 87116 MYCOBACTERIA CULTURE: CPT | Performed by: SURGERY

## 2023-04-20 PROCEDURE — 84100 ASSAY OF PHOSPHORUS: CPT | Performed by: INTERNAL MEDICINE

## 2023-04-20 RX ORDER — LIDOCAINE HYDROCHLORIDE 20 MG/ML
INJECTION, SOLUTION EPIDURAL; INFILTRATION; INTRACAUDAL; PERINEURAL
Status: DISCONTINUED | OUTPATIENT
Start: 2023-04-20 | End: 2023-04-20

## 2023-04-20 RX ORDER — BUPIVACAINE HYDROCHLORIDE 2.5 MG/ML
INJECTION, SOLUTION EPIDURAL; INFILTRATION; INTRACAUDAL
Status: DISCONTINUED | OUTPATIENT
Start: 2023-04-20 | End: 2023-04-20 | Stop reason: HOSPADM

## 2023-04-20 RX ORDER — LIDOCAINE HYDROCHLORIDE 20 MG/ML
JELLY TOPICAL
Status: DISCONTINUED | OUTPATIENT
Start: 2023-04-20 | End: 2023-04-28 | Stop reason: HOSPADM

## 2023-04-20 RX ORDER — FENTANYL CITRATE 50 UG/ML
INJECTION, SOLUTION INTRAMUSCULAR; INTRAVENOUS
Status: DISCONTINUED | OUTPATIENT
Start: 2023-04-20 | End: 2023-04-20

## 2023-04-20 RX ORDER — FAMOTIDINE 10 MG/ML
INJECTION INTRAVENOUS
Status: DISCONTINUED | OUTPATIENT
Start: 2023-04-20 | End: 2023-04-20

## 2023-04-20 RX ORDER — PROPOFOL 10 MG/ML
VIAL (ML) INTRAVENOUS
Status: DISCONTINUED | OUTPATIENT
Start: 2023-04-20 | End: 2023-04-20

## 2023-04-20 RX ORDER — NEOSTIGMINE METHYLSULFATE 1 MG/ML
INJECTION, SOLUTION INTRAVENOUS
Status: DISCONTINUED | OUTPATIENT
Start: 2023-04-20 | End: 2023-04-20

## 2023-04-20 RX ADMIN — SODIUM CHLORIDE: 0.9 INJECTION, SOLUTION INTRAVENOUS at 07:04

## 2023-04-20 RX ADMIN — MEROPENEM 1 G: 1 INJECTION, POWDER, FOR SOLUTION INTRAVENOUS at 09:04

## 2023-04-20 RX ADMIN — ONDANSETRON 4 MG: 2 INJECTION INTRAMUSCULAR; INTRAVENOUS at 05:04

## 2023-04-20 RX ADMIN — CHLORHEXIDINE GLUCONATE 15 ML: 1.2 RINSE ORAL at 09:04

## 2023-04-20 RX ADMIN — NEOSTIGMINE 5 MG: 1 INJECTION INTRAVENOUS at 06:04

## 2023-04-20 RX ADMIN — SODIUM CHLORIDE, SODIUM LACTATE, POTASSIUM CHLORIDE, AND CALCIUM CHLORIDE: .6; .31; .03; .02 INJECTION, SOLUTION INTRAVENOUS at 05:04

## 2023-04-20 RX ADMIN — FAMOTIDINE 20 MG: 10 INJECTION, SOLUTION INTRAVENOUS at 05:04

## 2023-04-20 RX ADMIN — NOREPINEPHRINE BITARTRATE 0.1 MCG/KG/MIN: 1 INJECTION, SOLUTION, CONCENTRATE INTRAVENOUS at 04:04

## 2023-04-20 RX ADMIN — FENTANYL CITRATE 50 MCG: 0.05 INJECTION, SOLUTION INTRAMUSCULAR; INTRAVENOUS at 05:04

## 2023-04-20 RX ADMIN — SODIUM CHLORIDE: 0.9 INJECTION, SOLUTION INTRAVENOUS at 08:04

## 2023-04-20 RX ADMIN — FENTANYL CITRATE 50 MCG: 0.05 INJECTION, SOLUTION INTRAMUSCULAR; INTRAVENOUS at 06:04

## 2023-04-20 RX ADMIN — PROPOFOL 120 MG: 10 INJECTION, EMULSION INTRAVENOUS at 05:04

## 2023-04-20 RX ADMIN — LIDOCAINE HYDROCHLORIDE 50 MG: 20 INJECTION, SOLUTION INTRAVENOUS at 05:04

## 2023-04-20 RX ADMIN — VANCOMYCIN HYDROCHLORIDE 1500 MG: 1.5 INJECTION, POWDER, LYOPHILIZED, FOR SOLUTION INTRAVENOUS at 05:04

## 2023-04-20 RX ADMIN — MELATONIN 6 MG: at 09:04

## 2023-04-20 RX ADMIN — GLYCOPYRROLATE 0.6 MG: 0.2 INJECTION, SOLUTION INTRAMUSCULAR; INTRAVITREAL at 06:04

## 2023-04-20 NOTE — ANESTHESIA PROCEDURE NOTES
Intubation    Date/Time: 4/20/2023 6:00 PM  Performed by: Darius Jones CRNA  Authorized by: Jignesh Davies Jr., MD     Intubation:     Induction:  Rapid sequence induction    Mask Ventilation:  Not attempted    Attempts:  1    Attempted By:  CRNA    Method of Intubation:  Video laryngoscopy    Blade:  Dexter 3    Laryngeal View Grade: Grade I - full view of cords      Difficult Airway Encountered?: No      Complications:  None    Airway Device:  Oral endotracheal tube    Airway Device Size:  7.5    Style/Cuff Inflation:  Cuffed    Inflation Amount (mL):  5    Tube secured:  20    Placement Verified By:  Capnometry    Complicating Factors:  None    Findings Post-Intubation:  BS equal bilateral

## 2023-04-20 NOTE — PROGRESS NOTES
[facility] Medicine  Progress Note    Patient name: Richard Bustos  MRN: [id number]  Admit Date: 4/19/2023   LOS: 1 day     SUBJECTIVE:     Principal problem: Septic shock  Chief Complaint   Patient presents with    Shortness of Breath       Interval History:  He is doing much better today. Plans for surgery for debridement. INR now corrected with FFP and vitamin K    Scheduled Meds:   chlorhexidine  15 mL Mouth/Throat BID    meropenem (MERREM) IVPB  1 g Intravenous Q12H     Continuous Infusions:   sodium chloride 0.9% 130 mL/hr at 04/20/23 0741    NORepinephrine bitartrate-D5W 0.8 mcg/kg/min (04/20/23 1100)    NORepinephrine bitartrate-D5W 0.22 mcg/kg/min (04/19/23 1000)     PRN Meds:sodium chloride, acetaminophen, calcium gluconate IVPB, calcium gluconate IVPB, calcium gluconate IVPB, dextrose 50%, dextrose 50%, glucagon (human recombinant), glucose, glucose, insulin aspart U-100, LIDOcaine HCl 2%, magnesium sulfate IVPB, magnesium sulfate IVPB, melatonin, ondansetron, polyethylene glycol, potassium chloride **AND** potassium chloride **AND** potassium chloride, sodium phosphate IVPB, sodium phosphate IVPB, sodium phosphate IVPB, Pharmacy to dose Vancomycin consult **AND** vancomycin - pharmacy to dose    Review of patient's allergies indicates:   Allergen Reactions    Donepezil Other (See Comments)     AMS    Bactroban [mupirocin calcium] Blisters     Causes Blisters    Shellfish containing products Other (See Comments)     Other reaction(s): Gout  OYSTERS       Review of Systems: As per interval history    OBJECTIVE:     Vital Signs (Most Recent)  Temp: 99.2 °F (37.3 °C) (04/20/23 1101)  Pulse: 82 (04/20/23 1315)  Resp: (!) 40 (04/20/23 1315)  BP: (!) 147/49 (04/20/23 1101)  SpO2: 97 % (04/20/23 1315)    Vital Signs Range (Last 24H):  Temp:  [98 °F (36.7 °C)-99.2 °F (37.3 °C)]   Pulse:  []   Resp:  [19-42]   BP: ()/(49-64)   SpO2:  [86 %-99 %]   Arterial Line BP: (0-171)/(0-57)      I & O (Last 24H):  Intake/Output Summary (Last 24 hours) at 4/20/2023 1507  Last data filed at 4/20/2023 0549  Gross per 24 hour   Intake 2882.58 ml   Output 1700 ml   Net 1182.58 ml       Physical Exam:  General: Patient resting comfortably in no acute distress. Appears as stated age. Calm  Eyes: No conjunctival injection. No scleral icterus.  ENT: Hearing grossly intact. No discharge from ears. No nasal discharge.   Neck: Supple, trachea midline. No JVD  CVS: RRR. No LE edema BL  Lungs: CTA BL, no wheezing or crackles. Good breath sounds. No accessory muscle use. No acute respiratory distress  Abdomen:  Soft, nontender and nondistended.  No organomegaly  Neuro: AOx3. Moves all extremities. Follows commands. Responds appropriately   Skin:  sacral decub, bilateral feet with skin decay    Laboratory:  I have reviewed all pertinent lab results within the past 24 hours.    Diagnostic Results:       ASSESSMENT/PLAN:     Pt is a 78 y/o with the below medical problems, admitted with septic shock due to infected sacral decubitus ulcer    Active Hospital Problems    Diagnosis  POA    *Septic shock [A41.9, R65.21]  Yes    Pressure injury of sacral region, unstageable [L89.150]  Yes    Supratherapeutic INR [R79.1]  Yes    Anemia [D64.9]  Yes    Multiple skin tears [T14.8XXA]  Yes    Closed nondisplaced fracture of sixth cervical vertebra [S12.501A]  Yes    Dementia without behavioral disturbance [F03.90]  Yes    EMMY (acute kidney injury) [N17.9]  Yes     Chronic    Chronic systolic congestive heart failure [I50.22]  Yes    Peripheral vascular disease [I73.9]  Yes    Paroxysmal atrial fibrillation [I48.0]  Yes     EKG stable  Stable on Coreg, no missed doses of anticoagulation. Continue anticoagulation as described below        BPH with obstruction/lower urinary tract symptoms [N40.1, N13.8]  Yes    Diabetic neuropathy [E11.40]  Yes    OA (osteoarthritis). post bilateral THR, 2001 [M19.90]  Yes    Long term (current) use  of anticoagulants [Z79.01]  Not Applicable     Risk vs benefit of anticoagulation medication discussed at length with patient and daughter.  With his history of MTHFR, a fib and hypercoagulability the benefit preventing of thrombus formation/stent reocclusion is difficult to balance with risk of bleeding.  That said, we will continue anticoagulation and reassess if further episodes of bleeding occur.   Monitor.        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         Plan:   - continue broad abx  - INR now corrected  - continue vancomycin and merrem  - follow culture results  - decrease IVF  - surgery today  - maintain NPO  - appreciate surgery recommendations  - appreciate ID recommendations      VTE Risk Mitigation (From admission, onward)           Ordered     Place KURTIS hose  Until discontinued         04/19/23 0157     IP VTE HIGH RISK PATIENT  Once         04/19/23 0157                      Department Hospital Medicine  [facility]  Brennen Castillo MD  Date of service: 04/20/2023

## 2023-04-20 NOTE — PLAN OF CARE
Plan of care and education reviewed with patient and family at bedside. Verbalization of understanding expressed. Pt taken to OR for debridement of sacral wound at 17:50 this evening. Family informed via telephone (Citlali, daughter). Pt has remained on levo gtt today, with current rate of 0.1 mcg. Bed locked in low position, call light and personal phone within reach. Bed alarm activated. Will continue to monitor.   Problem: Adult Inpatient Plan of Care  Goal: Plan of Care Review  Outcome: Ongoing, Progressing  Goal: Patient-Specific Goal (Individualized)  Outcome: Ongoing, Progressing  Goal: Absence of Hospital-Acquired Illness or Injury  Outcome: Ongoing, Progressing  Goal: Optimal Comfort and Wellbeing  Outcome: Ongoing, Progressing  Goal: Readiness for Transition of Care  Outcome: Ongoing, Progressing     Problem: Diabetes Comorbidity  Goal: Blood Glucose Level Within Targeted Range  Outcome: Ongoing, Progressing     Problem: Fluid and Electrolyte Imbalance (Acute Kidney Injury/Impairment)  Goal: Fluid and Electrolyte Balance  Outcome: Ongoing, Progressing     Problem: Oral Intake Inadequate (Acute Kidney Injury/Impairment)  Goal: Optimal Nutrition Intake  Outcome: Ongoing, Progressing     Problem: Renal Function Impairment (Acute Kidney Injury/Impairment)  Goal: Effective Renal Function  Outcome: Ongoing, Progressing     Problem: Adjustment to Illness (Sepsis/Septic Shock)  Goal: Optimal Coping  Outcome: Ongoing, Progressing     Problem: Bleeding (Sepsis/Septic Shock)  Goal: Absence of Bleeding  Outcome: Ongoing, Progressing     Problem: Glycemic Control Impaired (Sepsis/Septic Shock)  Goal: Blood Glucose Level Within Desired Range  Outcome: Ongoing, Progressing     Problem: Infection Progression (Sepsis/Septic Shock)  Goal: Absence of Infection Signs and Symptoms  Outcome: Ongoing, Progressing     Problem: Nutrition Impaired (Sepsis/Septic Shock)  Goal: Optimal Nutrition Intake  Outcome: Ongoing, Progressing      Problem: Infection  Goal: Absence of Infection Signs and Symptoms  Outcome: Ongoing, Progressing     Problem: Impaired Wound Healing  Goal: Optimal Wound Healing  Outcome: Ongoing, Progressing     Problem: Fall Injury Risk  Goal: Absence of Fall and Fall-Related Injury  Outcome: Ongoing, Progressing     Problem: Skin Injury Risk Increased  Goal: Skin Health and Integrity  Outcome: Ongoing, Progressing     Problem: Malnutrition  Goal: Improved Nutritional Intake  Outcome: Ongoing, Progressing

## 2023-04-20 NOTE — TRANSFER OF CARE
"Anesthesia Transfer of Care Note    Patient: Richard Bustos    Procedure(s) Performed: Procedure(s) (LRB):  DEBRIDEMENT, WOUND, SACRUM (N/A)    Patient location: PACU    Anesthesia Type: general    Transport from OR: Transported from OR on room air with adequate spontaneous ventilation. Transported from OR on 100% O2 by closed face mask with adequate spontaneous ventilation. Continuous ECG monitoring in transport. Continuous SpO2 monitoring in transport. Continuos invasive BP monitoring in transport    Post pain: adequate analgesia    Post assessment: no apparent anesthetic complications    Post vital signs: stable    Level of consciousness: awake, alert and oriented    Nausea/Vomiting: no nausea/vomiting    Complications: none    Transfer of care protocol was followed      Last vitals:   Visit Vitals  BP (!) 119/55   Pulse 71   Temp 37.4 °C (99.4 °F) (Axillary)   Resp (!) 29   Ht 5' 8" (1.727 m)   Wt 100.4 kg (221 lb 5.5 oz)   SpO2 98%   BMI 33.65 kg/m²     "

## 2023-04-20 NOTE — PROGRESS NOTES
Progress Note  Infectious Disease    Reason for Consult:  Septic shock     HPI: Richard Bustos is a 77 y.o. male obese, BMI 33/6 Kg/m2, with past medical history of HTN, DM, CAD s/p MI and cardiac stents, AFib, MTHFR mutation on Coumadin, HLD, gout, ongoing dementia and prior fall on 3/17/23 which resulted in head trauma, and cervical fracture with cord edema status post cervical spine fusion on 03/24/2023 by Neurosurgery.  Patient was discharged to [facility] nursing [facility] [place] to continue recovery.  Unfortunately, since he has been bed-bound since his fall, he has developed unstageable sacrococcygeal wound.    Daughters at bedside providing most of the history, patient was found altered and hypotensive at facility yesterday, sent to [facility] ER for further workup.    Patient does not remember what happened yesterday, he is aware he is at the hospital in ICU, denies any fever or chills, no headache, no nausea or vomiting, no cough, no shortness of breath, no abdominal pain, no dysuria or increased urinary frequency, no changes in the color of the urine, reports having diarrhea a few days ago, had a tiny bowel movement earlier today.    At [place], despite IV fluids, patient required Levophed, transferred to [facility] for ICU care.      Labs on admission with leukocytosis of 13, left shift 84%, bands 4%, H&H 9.1/27.4, , platelet count 306   INR 7.8--9.4, very high   EMMY, creatinine 2.5 (baseline 1.2)  .4 at [place], high   Hemoglobin A1c 5.4   U tox negative   UA negative for UTI   Chest x-ray with hypoventilation, mild cardiomegaly.  No acute infiltrates.  CT head negative for acute pathology     Empirically started on vancomycin and cefepime IV.      ID consult for septic shock.    Upon chart reviewed, patient has history of non healing ulcer on L 2nd toe, completed 6 weeks of oral antibiotics with Augmentin for pan-sensitive Porteus mirabilis for possible osteomyelitis.    4/20:  Interim reviewed,  patient seen examined at bedside, awake, alert, pressors decreased to 0.1, plan for OR later today for sacral decubitus ulcer debridement.  Patient is grateful for the care he has been given at the hospital.  Complaining of bilateral lower extremity pain.  Afebrile.  Labs reviewed, leukocytosis 15.5, left shift 90.8%, no bands, H&H 7.2/20.9, platelet count 275.  INR reversed, 1.3, creatinine 2, trending down, transaminitis /.  Seen by Wound Care yesterday, status post debridement at bedside, wound cultures with GNR, non lactose , pending final.  Micro at [place], blood cultures x2 from 04/18, no growth to date, pending final.         Review of patient's allergies indicates:   Allergen Reactions    Donepezil Other (See Comments)     AMS    Bactroban [mupirocin calcium] Blisters     Causes Blisters    Shellfish containing products Other (See Comments)     Other reaction(s): Gout  OYSTERS     Past Medical History:   Diagnosis Date    Anemia     Anemia of other chronic disease 09/13/2017    Anemia, chronic renal failure 09/13/2017    Anemia, unspecified 09/13/2017    Anticoagulant long-term use     Aorta aneurysm     Arthritis     Atrial fibrillation     Bacteremia due to Streptococcus 01/08/2022    CAD (coronary artery disease)     CHF (congestive heart failure)     Chronic kidney disease     Clotting disorder 09/13/2017    Colon polyp     Dementia     Diabetes mellitus     not on meds    Diabetes mellitus type II     Diverticulosis     Elevated PSA     Encounter for blood transfusion     Former smoker     General anesthetics causing adverse effect in therapeutic use     TROUBLE COMING OUT OF ANESTHESIA WITH GASTRIC BYPASS    GERD (gastroesophageal reflux disease)     Gout     Hx of colonic polyps     Hypercoagulable state     Hyperlipidemia     Hypertension     MI, old 2010    MTHFR mutation     Myocardial infarction     9/2010    Osteomyelitis of ankle or  foot 03/09/2017    Prostate cancer 06/2016    Sleep apnea     Squamous cell carcinoma 01/23/2018    Left helix, imiquimod    Venous stasis     Chronic     Past Surgical History:   Procedure Laterality Date    ABDOMINAL SURGERY      CARDIAC SURGERY      3 STENTS     CAUDAL EPIDURAL STEROID INJECTION N/A 6/22/2020    Procedure: INJECTION, STEROID, SPINE, EPIDURAL, CAUDAL;  Surgeon: Evangelist Bose MD;  Location: [facility] OR;  Service: Pain Management;  Laterality: N/A;    COLONOSCOPY  02/2011    diverticulosis with diverticula with clot, no active bleeding    COLONOSCOPY N/A 8/24/2016    Procedure: COLONOSCOPY;  Surgeon: Saroj Borjas MD;  Location: [facility] ENDO;  Service: Endoscopy;  Laterality: N/A;    COLONOSCOPY N/A 11/18/2020    Procedure: COLONOSCOPY;  Surgeon: Saroj Borjas MD;  Location: [facility] ENDO;  Service: Endoscopy;  Laterality: N/A;    COLONOSCOPY N/A 10/27/2021    Procedure: COLONOSCOPY;  Surgeon: Saroj Borjas MD;  Location: [facility] ENDO;  Service: Endoscopy;  Laterality: N/A;    EPIDURAL STEROID INJECTION INTO LUMBAR SPINE N/A 6/22/2020    Procedure: Injection-steroid-epidural-lumbar;  Surgeon: Evangelist Bose MD;  Location: [facility] OR;  Service: Pain Management;  Laterality: N/A;  L3 L4 L5 S1    EPIDURAL STEROID INJECTION INTO LUMBAR SPINE N/A 9/21/2020    Procedure: Injection-steroid-epidural-lumbar;  Surgeon: Evangelist Bose MD;  Location: [facility] OR;  Service: Pain Management;  Laterality: N/A;  L5-S1    FUSION, SPINE, CERVICAL N/A 3/24/2023    Procedure: FUSION, SPINE, CERVICAL;  Surgeon: Kike Kc DO;  Location: [facility] OR;  Service: Neurosurgery;  Laterality: N/A;  posterior cervical decompression/fusion C3-T1    GASTRIC BYPASS  2/5/2008    IVC FILTER RETRIEVAL      JOINT REPLACEMENT  1996 and 2001    bi-lat hip replacement/Rt Hip and Lt Hip    RADIOFREQUENCY ABLATION OF LUMBAR MEDIAL BRANCH NERVE AT SINGLE LEVEL Bilateral 1/10/2020    Procedure: Radiofrequency Ablation,  Nerve, Spinal, Lumbar, Medial Branch, 1 Level;  Surgeon: Evangelist Bose MD;  Location: [facility] OR;  Service: Pain Management;  Laterality: Bilateral;  L3,L4,L5    RADIOFREQUENCY ABLATION OF LUMBAR MEDIAL BRANCH NERVE AT SINGLE LEVEL Bilateral 11/23/2021    Procedure: Radiofrequency Ablation, Nerve, Spinal, Lumbar, Medial Branch, Bilateral L 3,4,5;  Surgeon: Nile Causey MD;  Location: [facility] OR;  Service: Pain Management;  Laterality: Bilateral;    ROTATOR CUFF REPAIR      Rt shoulder    Stents  8/18/2010    x 3    UPPER GASTROINTESTINAL ENDOSCOPY  02/2011     Social History     Tobacco Use    Smoking status: Former    Smokeless tobacco: Never    Tobacco comments:     45 yrs ago, only smoked 3-4 packs in his entire life as a teenager   Substance Use Topics    Alcohol use: Not Currently     Comment: rare        Family History   Problem Relation Age of Onset    Heart attack Mother     Heart attack Father     Heart attack Brother     Ulcerative colitis Daughter 35    Lupus Daughter     Colon cancer Neg Hx     Colon polyps Neg Hx     Crohn's disease Neg Hx     Melanoma Neg Hx     Psoriasis Neg Hx     Eczema Neg Hx        Pertinent medications noted: warfarin     Review of Systems:   No chills, fever, sweats, weight loss  No change in vision, loss of vision or diplopia  No sinus congestion, purulent nasal discharge, post nasal drip or facial pain  No pain in mouth or throat. No problems with teeth, gums.  No chest pain, palpitations, syncope  No cough, sputum production, shortness of breath, dyspnea on exertion, pleurisy, hemoptysis  No dysphagia, odynophagia  No nausea, vomiting, diarrhea, constipation, blood in stool, or focal abd pain  No dysuria, hesitancy, hematuria, retention, incontinence  No swelling of joints, redness of joints, injuries, or new focal pain  No unusual headaches, dizziness, vertigo,prior fall 3/17/23  No anxiety, depression, substance abuse, sleep disturbance  No bleeding,  lymphadenopathy  S/p fall with multiple ecchymosis on face/forehead, arms and legs     Outdoor activities: Resident of [facility], former smoker, no alcohol, 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 Alert and attentive, cooperative, comfortable on room air  Eyes:  Anicteric, PERRL, resolving ecchymosis on eyelids b/l  ENT:  Moist oral mucosa, terrible oral hygiene, missing most of his upper teeth, has 3 lower teeth left, no ulcers, exudates, thrush, nares patent  Neck:  Supple  Lungs: Clear to auscultation b/l  Heart:  S1/S2+, regular rhythm, no murmurs  Abd:  Obese, RLQ firm mass palpated, non tender to palpation, +BS, soft, non tender, non distended, no rebound  :  Ruelas, urine clear  Musc:  Except for R great toe, joints without effusion, swelling,  erythema, synovitis, non-ambulatory  Skin:  Warm, resolving ecchymosis on forehead, face, upper extremities and shins  Wounds: Sacral wound with dressing in place, not disturbed   4/19: Sacrococcygeal unstageable foul-smelling wound, R great toe with 2 non-healing ulcers, with surrounding redness c/w cellulitis  L foot with lateral unsteageable wound  Neuro:  Following commands, speech is clear, moving all extremities, RUE 5/5, LUE 3/5, RLE 4/5, LLE 4/5  Psych:  Calm, cooperative  Lymphatic:       Extrem: B/l legs with dressing in placed, not disturbed   VAD:  R femoral line 4/18    R a-line 4/18    L peripheral IV      Isolation: None    4/19:      Sacrum             Sacrum                   Resolving ecchymosis face      R great toe  L lateral foot - unstageable wound    General Labs reviewed:  Recent Labs   Lab 04/19/23  0215 04/19/23  1118 04/20/23  0434   WBC 18.23* 13.09* 15.52*   HGB 8.1* 7.2* 7.2*   HCT 24.6* 21.9* 20.9*    297 275       Recent Labs   Lab 04/19/23  0940 04/19/23  1055 04/20/23  0434   NA SEE COMMENT 139 141   K SEE COMMENT 3.9 3.7   CL SEE COMMENT 105 112*   CO2 SEE COMMENT 20* 19*   BUN SEE COMMENT 60* 55*   CREATININE SEE COMMENT 2.2* 2.0*   CALCIUM SEE COMMENT 8.1* 8.4*   PROT SEE COMMENT 4.8* 4.9*   BILITOT SEE COMMENT 0.8 1.2*   ALKPHOS SEE COMMENT 97 299*   ALT SEE COMMENT 35 192*   AST SEE COMMENT 38 357*     Recent Labs   Lab 04/18/23  1328   .4*     No results for input(s): SEDRATE in the last 168 hours.    Estimated Creatinine Clearance: 36.7 mL/min (A) (based on SCr of 2 mg/dL (H)).       Prior Micro:   Wound cultures left foot 10/18/2022 pansensitive Proteus mirabilis  Wound cultures left foot 08/24/2022 pansensitive Proteus mirabilis, and Porphyromonas somerae    Micro:  Blood cultures x 2 4/18 at [facility] no growth to date    Microbiology Results (last 7 days)       Procedure Component Value Units Date/Time    Aerobic culture [[id number]]  (Abnormal) Collected: 04/19/23 1130    Order Status: Completed Specimen: Wound from Sacrum Updated: 04/20/23 0850     Aerobic Bacterial Culture GRAM NEGATIVE VON, NON-LACTOSE   Moderate  Identification and susceptibility pending      Narrative:      Sacrum  (SWAB)    Culture, Anaerobic [[id number]] Collected: 04/19/23 1239    Order Status: Sent Specimen: Wound from Sacrum Updated: 04/19/23 1318    MRSA Screen by PCR [[id number]] Collected: 04/19/23 0940    Order Status: Completed Specimen: Nasopharyngeal Swab from Nasal Updated: 04/19/23 1153     MRSA SCREEN BY PCR Negative              Imaging Reviewed:  CXR  CT head   X-ray R foot: No radiographic evidence of osteomyelitis.  LE Arterial US: No clinically  significant stenosis, large vessel occlusion or aneurysm in the visualized arteries of the lower extremities.     Cardiology:       IMPRESSION & PLAN     Septic shock in the setting of unstageable foul-smelling sacrococcygeal ulcer   at [facility], high  Blood cultures x 2 no growth to date, pending final   Bedside culture 4/19 GNR nonlactose , pending final     2. B/l feet non healing ulcers, likely DTIs from being bed-bound    3. EMMY, cr 2.0, baseline 1.2    4. Supratherapeutic INR, 9.4, reversed 1.6 today     5. PMHx: HTN, DM, CAD s/p MI and cardiac stents, AFib, MTHFR mutation on Coumadin, HLD, gout, ongoing dementia      Recommendations:  Adequate source control  Surgery for I&D and washout of sacral decub ulcer; please send deep tissue for Gram stain and cultures  Meropenem 1g IV q12h, dose as per crcl  Continue Vancomycin IV, keep level 15-20  Consider Podiatry eval for b/l non-healing chronic ulcers when feasible   Follow cultures   Will adjust abx therapy based on cultures   Aspiration precautions   Wound care to all affected areas   He would likely benefit from colostomy to prevent further infections of sacrococcygeal wound since he is non-ambulatory     D/w patient, nursing, Dr Castillo     Medical Decision Making during this encounter was  [_] Low Complexity  [_] Moderate Complexity  [xx] High Complexity

## 2023-04-20 NOTE — CARE UPDATE
04/19/23 1953   Patient Assessment/Suction   Level of Consciousness (AVPU) alert   Respiratory Effort Normal   PRE-TX-O2   Device (Oxygen Therapy) room air   SpO2 (!) 93 %   Pulse Oximetry Type Continuous   $ Pulse Oximetry - Multiple Charge Pulse Oximetry - Multiple   Education   $ Education 15 min   Respiratory Evaluation   $ Care Plan Tech Time 15 min       
   04/20/23 0751   Patient Assessment/Suction   Level of Consciousness (AVPU) alert   Respiratory Effort Normal;Unlabored   Expansion/Accessory Muscles/Retractions expansion symmetric;no retractions;no use of accessory muscles   Rhythm/Pattern, Respiratory depth regular;pattern regular;unlabored   PRE-TX-O2   Device (Oxygen Therapy) room air   SpO2 98 %   $ Pulse Oximetry - Multiple Charge Pulse Oximetry - Multiple   Pulse 65   Resp (!) 26   Education   $ Education 15 min   Respiratory Evaluation   $ Care Plan Tech Time 15 min   $ Eval/Re-eval Charges Evaluation       
show

## 2023-04-20 NOTE — CONSULTS
[facility]  General Surgery  Consult Note    Inpatient consult to General Surgery  Consult performed by: Estevan Silva III, MD  Consult ordered by: Tyson Black MD  Reason for consult: Infected sacral decubitus      Subjective:     Chief Complaint/Reason for Admission:  Infected sacral decubitus    History of Present Illness:  Patient is 77-year-old male brought in from his nursing home with septic shock, super therapeutic INR.  Source of infection found to be a large infected, necrotic sacral decubitus wound.  Wound developed over the last month while in nursing home.  INR now down to 1.3.  Originally it was over 10 on admission.  He is awake and alert.  Reportedly more confused than baseline as far as orientation to time and place.  His Levophed has been weaning today.  Still on low-dose.  Current Facility-Administered Medications on File Prior to Encounter   Medication    lactated ringers infusion     Current Outpatient Medications on File Prior to Encounter   Medication Sig    amiodarone (PACERONE) 200 MG Tab Take 1 tablet (200 mg total) by mouth 2 (two) times daily.    aspirin (ECOTRIN) 81 MG EC tablet Take 81 mg by mouth once daily.    calcitRIOL (ROCALTROL) 0.5 MCG Cap Take 1 capsule (0.5 mcg total) by mouth once daily.    doxycycline (VIBRA-TABS) 100 MG tablet Take 100 mg by mouth 2 (two) times daily.    famotidine (PEPCID) 40 MG tablet Take 1 tablet (40 mg total) by mouth once daily.    ferrous sulfate (FEROSUL) 325 mg (65 mg iron) Tab tablet TAKE 1 TABLET BY MOUTH TWICE DAILY    fluticasone propionate (FLONASE) 50 mcg/actuation nasal spray SHAKE LIQUID AND USE 2 SPRAYS(100 MCG) IN EACH NOSTRIL EVERY DAY    FOLIC ACID/MULTIVIT-MIN/LUTEIN (CENTRUM SILVER ORAL) Take 1 tablet by mouth once daily.    magnesium oxide (MAG-OX) 400 mg (241.3 mg magnesium) tablet Take 1 tablet (400 mg total) by mouth once daily.    mecobal-levomefolat Ca-B6 phos (L-METHYL-B6-B12) 3-35-2 mg Tab Take 1 tablet  by mouth 2 (two) times daily.    mirabegron (MYRBETRIQ) 50 mg Tb24 Take 1 tablet (50 mg total) by mouth once daily.    allopurinoL (ZYLOPRIM) 100 MG tablet Take 1 tablet (100 mg total) by mouth every evening.    atorvastatin (LIPITOR) 80 MG tablet Take 1 tablet (80 mg total) by mouth once daily.    clopidogreL (PLAVIX) 75 mg tablet Take 1 tablet (75 mg total) by mouth once daily.    lisinopriL (PRINIVIL,ZESTRIL) 20 MG tablet Take 1 tablet (20 mg total) by mouth every evening.    nitroGLYCERIN 0.4 MG/DOSE TL SPRY (NITROLINGUAL) 400 mcg/spray spray PLACE 1 SPRAY UNDER THE TONGUE EVERY 5 MINUTES AS NEEDED FOR CHEST PAIN    omeprazole (PRILOSEC) 20 MG capsule Take 1 capsule (20 mg total) by mouth once daily.    prothrombin time/INR test metr Misc 1 Stick by Misc.(Non-Drug; Combo Route) route every 30 days.    warfarin (COUMADIN) 5 MG tablet Take 1 tablet (5 mg total) by mouth Daily.    warfarin (COUMADIN) 5 MG tablet Take 1 tablet (5 mg total) by mouth Daily.       Review of patient's allergies indicates:   Allergen Reactions    Donepezil Other (See Comments)     AMS    Bactroban [mupirocin calcium] Blisters     Causes Blisters    Shellfish containing products Other (See Comments)     Other reaction(s): Gout  OYSTERS       Past Medical History:   Diagnosis Date    Anemia     Anemia of other chronic disease 09/13/2017    Anemia, chronic renal failure 09/13/2017    Anemia, unspecified 09/13/2017    Anticoagulant long-term use     Aorta aneurysm     Arthritis     Atrial fibrillation     Bacteremia due to Streptococcus 01/08/2022    CAD (coronary artery disease)     CHF (congestive heart failure)     Chronic kidney disease     Clotting disorder 09/13/2017    Colon polyp     Dementia     Diabetes mellitus     not on meds    Diabetes mellitus type II     Diverticulosis     Elevated PSA     Encounter for blood transfusion     Former smoker     General anesthetics causing adverse effect in therapeutic use     TROUBLE COMING OUT  OF ANESTHESIA WITH GASTRIC BYPASS    GERD (gastroesophageal reflux disease)     Gout     Hx of colonic polyps     Hypercoagulable state     Hyperlipidemia     Hypertension     MI, old 2010    MTHFR mutation     Myocardial infarction     9/2010    Osteomyelitis of ankle or foot 03/09/2017    Prostate cancer 06/2016    Sleep apnea     Squamous cell carcinoma 01/23/2018    Left helix, imiquimod    Venous stasis     Chronic     Past Surgical History:   Procedure Laterality Date    ABDOMINAL SURGERY      CARDIAC SURGERY      3 STENTS     CAUDAL EPIDURAL STEROID INJECTION N/A 6/22/2020    Procedure: INJECTION, STEROID, SPINE, EPIDURAL, CAUDAL;  Surgeon: Evangelist Bose MD;  Location: [facility] OR;  Service: Pain Management;  Laterality: N/A;    COLONOSCOPY  02/2011    diverticulosis with diverticula with clot, no active bleeding    COLONOSCOPY N/A 8/24/2016    Procedure: COLONOSCOPY;  Surgeon: Saroj Borjas MD;  Location: [facility] ENDO;  Service: Endoscopy;  Laterality: N/A;    COLONOSCOPY N/A 11/18/2020    Procedure: COLONOSCOPY;  Surgeon: Saroj Borjas MD;  Location: [facility] ENDO;  Service: Endoscopy;  Laterality: N/A;    COLONOSCOPY N/A 10/27/2021    Procedure: COLONOSCOPY;  Surgeon: Saroj Borjas MD;  Location: [facility] ENDO;  Service: Endoscopy;  Laterality: N/A;    EPIDURAL STEROID INJECTION INTO LUMBAR SPINE N/A 6/22/2020    Procedure: Injection-steroid-epidural-lumbar;  Surgeon: Evangelist Bose MD;  Location: [facility] OR;  Service: Pain Management;  Laterality: N/A;  L3 L4 L5 S1    EPIDURAL STEROID INJECTION INTO LUMBAR SPINE N/A 9/21/2020    Procedure: Injection-steroid-epidural-lumbar;  Surgeon: Evangelist Bose MD;  Location: [facility] OR;  Service: Pain Management;  Laterality: N/A;  L5-S1    FUSION, SPINE, CERVICAL N/A 3/24/2023    Procedure: FUSION, SPINE, CERVICAL;  Surgeon: Kike Kc DO;  Location: [facility] OR;  Service: Neurosurgery;  Laterality: N/A;  posterior cervical decompression/fusion C3-T1    GASTRIC  BYPASS  2/5/2008    IVC FILTER RETRIEVAL      JOINT REPLACEMENT  1996 and 2001    bi-lat hip replacement/Rt Hip and Lt Hip    RADIOFREQUENCY ABLATION OF LUMBAR MEDIAL BRANCH NERVE AT SINGLE LEVEL Bilateral 1/10/2020    Procedure: Radiofrequency Ablation, Nerve, Spinal, Lumbar, Medial Branch, 1 Level;  Surgeon: Evangelist Bose MD;  Location: [facility] OR;  Service: Pain Management;  Laterality: Bilateral;  L3,L4,L5    RADIOFREQUENCY ABLATION OF LUMBAR MEDIAL BRANCH NERVE AT SINGLE LEVEL Bilateral 11/23/2021    Procedure: Radiofrequency Ablation, Nerve, Spinal, Lumbar, Medial Branch, Bilateral L 3,4,5;  Surgeon: Nile Causey MD;  Location: [facility] OR;  Service: Pain Management;  Laterality: Bilateral;    ROTATOR CUFF REPAIR      Rt shoulder    Stents  8/18/2010    x 3    UPPER GASTROINTESTINAL ENDOSCOPY  02/2011     Family History       Problem Relation (Age of Onset)    Heart attack Mother, Father, Brother    Lupus Daughter    Ulcerative colitis Daughter (35)          Tobacco Use    Smoking status: Former    Smokeless tobacco: Never    Tobacco comments:     45 yrs ago, only smoked 3-4 packs in his entire life as a teenager   Substance and Sexual Activity    Alcohol use: Not Currently     Comment: rare    Drug use: No    Sexual activity: Not Currently     Review of Systems   Unable to perform ROS: Dementia   Objective:     Vital Signs (Most Recent):  Temp: 99.4 °F (37.4 °C) (04/20/23 1501)  Pulse: 73 (04/20/23 1600)  Resp: (!) 29 (04/20/23 1600)  BP: (!) 101/59 (04/20/23 1600)  SpO2: (!) 90 % (04/20/23 1600)   Vital Signs (24h Range):  Temp:  [98 °F (36.7 °C)-99.4 °F (37.4 °C)] 99.4 °F (37.4 °C)  Pulse:  [] 73  Resp:  [19-42] 29  SpO2:  [86 %-99 %] 90 %  BP: ()/(49-64) 101/59  Arterial Line BP: ()/(34-57) 154/53     Weight: 100.4 kg (221 lb 5.5 oz)  Body mass index is 33.65 kg/m².      Intake/Output Summary (Last 24 hours) at 4/20/2023 1735  Last data filed at 4/20/2023 1652  Gross per 24 hour   Intake  1138.49 ml   Output 2500 ml   Net -1361.51 ml       Physical Exam  Constitutional:       General: He is awake.   HENT:      Head:      Comments: Bruising over the entire face from previous fall  Pulmonary:      Effort: Pulmonary effort is normal. No tachypnea, bradypnea, accessory muscle usage or respiratory distress.   Abdominal:      General: There is no distension.      Palpations: Abdomen is soft.      Tenderness: There is no abdominal tenderness.   Musculoskeletal:      Cervical back: Neck supple.   Skin:     Comments: Large, foul-smelling, necrotic wound over the sacrum.  Unstageable.   Neurological:      Mental Status: He is alert.   Psychiatric:         Behavior: Behavior is cooperative.       Significant Labs:  CBC:   Recent Labs   Lab 04/20/23 0434   WBC 15.52*   RBC 2.13*   HGB 7.2*   HCT 20.9*      MCV 98   MCH 33.8*   MCHC 34.4     CMP:   Recent Labs   Lab 04/20/23 0434      CALCIUM 8.4*   ALBUMIN 1.8*   PROT 4.9*      K 3.7   CO2 19*   *   BUN 55*   CREATININE 2.0*   ALKPHOS 299*   *   *   BILITOT 1.2*       Significant Diagnostics:  I have reviewed all pertinent imaging results/findings within the past 24 hours.    Assessment/Plan:     Active Diagnoses:    Diagnosis Date Noted POA    PRINCIPAL PROBLEM:  Septic shock [A41.9, R65.21] 01/06/2022 Yes    Pressure injury of sacral region, unstageable [L89.150] 04/19/2023 Yes    Supratherapeutic INR [R79.1] 04/19/2023 Yes    Anemia [D64.9] 04/01/2023 Yes    Multiple skin tears [T14.8XXA] 03/25/2023 Yes    Closed nondisplaced fracture of sixth cervical vertebra [S12.501A] 03/21/2023 Yes    Dementia without behavioral disturbance [F03.90] 03/14/2021 Yes    EMMY (acute kidney injury) [N17.9] 10/19/2019 Yes     Chronic    Chronic systolic congestive heart failure [I50.22] 10/19/2019 Yes    Peripheral vascular disease [I73.9] 02/16/2017 Yes    Paroxysmal atrial fibrillation [I48.0] 11/21/2014 Yes    BPH with  obstruction/lower urinary tract symptoms [N40.1, N13.8] 11/04/2014 Yes    Diabetic neuropathy [E11.40] 06/19/2013 Yes    OA (osteoarthritis). post bilateral THR, 2001 [M19.90] 02/22/2013 Yes    Long term (current) use of anticoagulants [Z79.01] 02/13/2013 Not Applicable    CKD (chronic kidney disease) stage 3, GFR 30-59 ml/min, 41 [N18.30] 01/23/2013 Yes    Carotid disease, bilateral [I77.9] 07/10/2012 Yes     Chronic    CAD (coronary artery disease) [I25.10] 12/16/2011 Yes    Diabetes mellitus with chronic kidney disease, stage III [E11.22] 12/16/2011 Yes     Chronic    GERD (gastroesophageal reflux disease) [K21.9] 12/16/2011 Yes    Hyperlipidemia associated with type 2 diabetes mellitus [E11.69, E78.5] 12/16/2011 Yes    Hypertension associated with diabetes [E11.59, I15.2] 12/16/2011 Yes    MTHFR mutation (methylenetetrahydrofolate reductase) [Z15.89] 12/16/2011 Not Applicable      Problems Resolved During this Admission:   Plan for incision, drainage, debridement of large infected necrotic sacral wound.  Risks and benefits of the procedure discussed with the patient and family.    Thank you for your consult.     Estevan Silva III, MD  General Surgery  [facility]

## 2023-04-20 NOTE — ANESTHESIA PREPROCEDURE EVALUATION
04/20/2023  Richard Bustos is a 77 y.o., male.      Pre-op Assessment    I have reviewed the Patient Summary Reports.     I have reviewed the Nursing Notes.    I have reviewed the Medications.     Review of Systems  Anesthesia Hx:  No problems with previous Anesthesia  History of prior surgery of interest to airway management or planning: cervical fusion. Denies Family Hx of Anesthesia complications.   Denies Personal Hx of Anesthesia complications.   Social:  Former Smoker, No Alcohol Use    Hematology/Oncology:         -- Anemia: Hematology Comments: Hx of transfusion   Cardiovascular:   Hypertension Past MI CAD   CHF    Pulmonary:   Sleep Apnea    Education provided regarding risk of obstructive sleep apnea     Renal/:   Chronic Renal Disease, CKD    Hepatic/GI:   GERD    Musculoskeletal:   Arthritis   Spine Disorders: lumbar Degenerative disease, Chronic Pain and Disc disease    Neurological:   Peripheral Neuropathy    Endocrine:   Diabetes, type 2        Patient Active Problem List   Diagnosis    Diabetes mellitus with chronic kidney disease, stage III    MTHFR mutation (methylenetetrahydrofolate reductase)    Sleep apnea, using BiPAP nightly, 1999, last sleep test in 2013    Hypertension associated with diabetes    CAD (coronary artery disease)    Hyperlipidemia associated with type 2 diabetes mellitus    Gout    GERD (gastroesophageal reflux disease)    Hypercoagulable state, Prothrombin mutation    Colon polyps    Anxiety    Obesity (BMI 30-39.9)    Carotid disease, bilateral    Aortic aneurysm, thoracic, 4.3 cm, 8/2010    CKD (chronic kidney disease) stage 3, GFR 30-59 ml/min, 41    LV dysfunction, EF 40%    Long term (current) use of anticoagulants    OA (osteoarthritis). post bilateral THR, 2001    Diabetic neuropathy    H/O bariatric surgery, 2008    Osteoarthritis, knee     BPH with obstruction/lower urinary tract symptoms    Proteinuria    Paroxysmal atrial fibrillation    Stasis dermatitis of both legs    Hypertensive left ventricular hypertrophy, without heart failure    Prostate cancer    Generalized weakness    Peripheral vascular disease    Facet arthropathy    Anemia due to stage 3 chronic kidney disease    Uncomplicated opioid dependence    MCI (mild cognitive impairment)    Cardiomyopathy    History of MI (myocardial infarction), 2010    Status post insertion of drug eluting coronary artery stent, 3x, last RCA 1/2015    Chronic systolic congestive heart failure    EMMY (acute kidney injury)    Back pain of lumbosacral region with sciatica    OAB (overactive bladder)    Urge incontinence    Complex renal cyst    Degenerative disc disease, lumbar    Hypoalbuminemia    Warfarin anticoagulation    Dementia without behavioral disturbance    History of arteriovenous malformation (AVM)    Septic shock    Secondary hyperparathyroidism of renal origin    Abdominal aortic atherosclerosis    Depression, recurrent    Ulcer of toe, left, with fat layer exposed    Closed nondisplaced fracture of sixth cervical vertebra    Multiple skin tears    Anemia    Pressure injury of sacral region, unstageable    Supratherapeutic INR       Past Surgical History:   Procedure Laterality Date    ABDOMINAL SURGERY      CARDIAC SURGERY      3 STENTS     CAUDAL EPIDURAL STEROID INJECTION N/A 6/22/2020    Procedure: INJECTION, STEROID, SPINE, EPIDURAL, CAUDAL;  Surgeon: Evangelist Bose MD;  Location: [facility] OR;  Service: Pain Management;  Laterality: N/A;    COLONOSCOPY  02/2011    diverticulosis with diverticula with clot, no active bleeding    COLONOSCOPY N/A 8/24/2016    Procedure: COLONOSCOPY;  Surgeon: Saroj Borjas MD;  Location: [facility];  Service: Endoscopy;  Laterality: N/A;    COLONOSCOPY N/A 11/18/2020    Procedure: COLONOSCOPY;  Surgeon: Saroj BUNDY  MD Suad;  Location: [facility] ENDO;  Service: Endoscopy;  Laterality: N/A;    COLONOSCOPY N/A 10/27/2021    Procedure: COLONOSCOPY;  Surgeon: Saroj Borjas MD;  Location: [facility] ENDO;  Service: Endoscopy;  Laterality: N/A;    EPIDURAL STEROID INJECTION INTO LUMBAR SPINE N/A 6/22/2020    Procedure: Injection-steroid-epidural-lumbar;  Surgeon: Evangelist Bose MD;  Location: [facility] OR;  Service: Pain Management;  Laterality: N/A;  L3 L4 L5 S1    EPIDURAL STEROID INJECTION INTO LUMBAR SPINE N/A 9/21/2020    Procedure: Injection-steroid-epidural-lumbar;  Surgeon: Evangelist Bose MD;  Location: [facility] OR;  Service: Pain Management;  Laterality: N/A;  L5-S1    FUSION, SPINE, CERVICAL N/A 3/24/2023    Procedure: FUSION, SPINE, CERVICAL;  Surgeon: Kike Kc DO;  Location: [facility] OR;  Service: Neurosurgery;  Laterality: N/A;  posterior cervical decompression/fusion C3-T1    GASTRIC BYPASS  2/5/2008    IVC FILTER RETRIEVAL      JOINT REPLACEMENT  1996 and 2001    bi-lat hip replacement/Rt Hip and Lt Hip    RADIOFREQUENCY ABLATION OF LUMBAR MEDIAL BRANCH NERVE AT SINGLE LEVEL Bilateral 1/10/2020    Procedure: Radiofrequency Ablation, Nerve, Spinal, Lumbar, Medial Branch, 1 Level;  Surgeon: Evangelist Bose MD;  Location: [facility] OR;  Service: Pain Management;  Laterality: Bilateral;  L3,L4,L5    RADIOFREQUENCY ABLATION OF LUMBAR MEDIAL BRANCH NERVE AT SINGLE LEVEL Bilateral 11/23/2021    Procedure: Radiofrequency Ablation, Nerve, Spinal, Lumbar, Medial Branch, Bilateral L 3,4,5;  Surgeon: Nile Causey MD;  Location: [facility] OR;  Service: Pain Management;  Laterality: Bilateral;    ROTATOR CUFF REPAIR      Rt shoulder    Stents  8/18/2010    x 3    UPPER GASTROINTESTINAL ENDOSCOPY  02/2011        Tobacco Use:  The patient  reports that he has quit smoking. He has never used smokeless tobacco.     Results for orders placed or performed during the hospital encounter of 04/18/23   EKG 12-lead    Collection Time: 04/18/23   1:36 PM    Narrative    Test Reason : R41.82,    Vent. Rate : 083 BPM     Atrial Rate : 073 BPM     P-R Int : 000 ms          QRS Dur : 096 ms      QT Int : 370 ms       P-R-T Axes : 000 009 -15 degrees     QTc Int : 434 ms    Accelerated Junctional rhythm with occasional Premature ventricular  complexes  Low voltage QRS  ST elevation consider inferior injury or acute infarct    ACUTE MI / STEMI    Consider right ventricular involvement in acute inferior infarct  Abnormal ECG  When compared with ECG of 28-MAR-2023 09:09,  Junctional rhythm has replaced Sinus rhythm  Right bundle branch block is no longer Present    Referred By: [email or web address]   SELF           Confirmed By:              Lab Results   Component Value Date    WBC 15.52 (H) 04/20/2023    HGB 7.2 (L) 04/20/2023    HCT 20.9 (LL) 04/20/2023    MCV 98 04/20/2023     04/20/2023     BMP  Lab Results   Component Value Date     04/20/2023    K 3.7 04/20/2023     (H) 04/20/2023    CO2 19 (L) 04/20/2023    BUN 55 (H) 04/20/2023    CREATININE 2.0 (H) 04/20/2023    CALCIUM 8.4 (L) 04/20/2023    ANIONGAP 10 04/20/2023     04/20/2023     (H) 04/19/2023    GLU SEE COMMENT 04/19/2023       Results for orders placed during the hospital encounter of 03/20/23    Echo    Interpretation Summary  · The left ventricle is normal in size with mildly decreased systolic function.  · Left ventricular diastolic dysfunction.  · The estimated ejection fraction is 45%.  · There is mild left ventricular global hypokinesis.  · Normal right ventricular size with normal right ventricular systolic function.  · The aortic root is mildly dilated.  · The ascending aorta is dilated.  · Trivial posterior pericardial effusion.  · Mild aortic regurgitation.  · Mild tricuspid regurgitation.  · Normal central venous pressure (3 mmHg).          Physical Exam  General: Well nourished and Alert    Airway:  Mallampati: II   Mouth Opening: Normal  TM Distance: Normal  Tongue:  Normal  Neck ROM: Normal ROM    Dental:  Intact    Chest/Lungs:  Clear to auscultation, Normal Respiratory Rate    Heart:  Rate: Normal  Rhythm: Regular Rhythm  Sounds: Normal        Anesthesia Plan  Type of Anesthesia, risks & benefits discussed:    Anesthesia Type: Gen ETT  Intra-op Monitoring Plan: Standard ASA Monitors  Post Op Pain Control Plan: multimodal analgesia  Induction:  IV  Informed Consent: Informed consent signed with the Patient and all parties understand the risks and agree with anesthesia plan.  All questions answered. Patient consented to blood products? Yes  ASA Score: 3    Ready For Surgery From Anesthesia Perspective.     .

## 2023-04-20 NOTE — PROGRESS NOTES
Pharmacokinetic Assessment Follow Up: IV Vancomycin    Patient brief summary:  Richard Bustos is a 77 y.o. male initiated on antimicrobial therapy with IV Vancomycin for treatment of Sepsis    The patient's current regimen is Vancomycin       Actual Body Weight = 100.4 kg (221 lb 5.5 oz).    Renal Function:   Estimated Creatinine Clearance: 36.7 mL/min (A) (based on SCr of 2 mg/dL (H)).      Vancomycin serum concentration assessment(s):    The random level was drawn correctly and can be used to guide therapy at this time. The measurement is within the desired definitive target range of 15 to 20 mcg/mL.    Vancomycin Regimen Plan:    Continue regimen to Vancomycin 1500 mg IV every 24 hours with next serum trough concentration measured at 1630 prior to 4th dose on 4/23/23    Drug levels (last 3 results):  Recent Labs   Lab Result Units 04/19/23  1405 04/20/23  1513   Vancomycin, Random ug/mL 7.8 16.1           Pharmacy will continue to follow and monitor vancomycin.    Please contact pharmacy at extension [phone] for questions regarding this assessment.    Thank you for the consult,   KAYLYN GARCIA

## 2023-04-21 LAB
ACINETOBACTER CALCOACETICUS/BAUMANNII COMPLEX: NOT DETECTED
ALBUMIN SERPL BCP-MCNC: 1.6 G/DL (ref 3.5–5.2)
ALP SERPL-CCNC: 303 U/L (ref 55–135)
ALT SERPL W/O P-5'-P-CCNC: 149 U/L (ref 10–44)
ANION GAP SERPL CALC-SCNC: 5 MMOL/L (ref 8–16)
AST SERPL-CCNC: 215 U/L (ref 10–40)
BACTERIA SPEC AEROBE CULT: ABNORMAL
BACTEROIDES FRAGILIS: NOT DETECTED
BASOPHILS # BLD AUTO: 0.01 K/UL (ref 0–0.2)
BASOPHILS NFR BLD: 0.1 % (ref 0–1.9)
BILIRUB SERPL-MCNC: 1 MG/DL (ref 0.1–1)
BUN SERPL-MCNC: 48 MG/DL (ref 8–23)
CALCIUM SERPL-MCNC: 8.7 MG/DL (ref 8.7–10.5)
CANDIDA ALBICANS: NOT DETECTED
CANDIDA AURIS: NOT DETECTED
CANDIDA GLABRATA: NOT DETECTED
CANDIDA KRUSEI: NOT DETECTED
CANDIDA PARAPSILOSIS: NOT DETECTED
CANDIDA TROPICALIS: NOT DETECTED
CHLORIDE SERPL-SCNC: 115 MMOL/L (ref 95–110)
CO2 SERPL-SCNC: 19 MMOL/L (ref 23–29)
CREAT SERPL-MCNC: 1.7 MG/DL (ref 0.5–1.4)
CRYPTOCOCCUS NEOFORMANS/GATTII: NOT DETECTED
CTX-M GENE: NOT DETECTED
DIFFERENTIAL METHOD: ABNORMAL
ENTEROBACTER CLOACAE COMPLEX: NOT DETECTED
ENTEROBACTERALES: NOT DETECTED
ENTEROCOCCUS FAECALIS: NOT DETECTED
ENTEROCOCCUS FAECIUM: NOT DETECTED
EOSINOPHIL # BLD AUTO: 0.2 K/UL (ref 0–0.5)
EOSINOPHIL NFR BLD: 1.5 % (ref 0–8)
ERYTHROCYTE [DISTWIDTH] IN BLOOD BY AUTOMATED COUNT: 14.7 % (ref 11.5–14.5)
ERYTHROCYTE [DISTWIDTH] IN BLOOD BY AUTOMATED COUNT: 14.9 % (ref 11.5–14.5)
ESCHERICHIA COLI: NOT DETECTED
EST. GFR  (NO RACE VARIABLE): 41.5 ML/MIN/1.73 M^2
GLUCOSE SERPL-MCNC: 132 MG/DL (ref 70–110)
GLUCOSE SERPL-MCNC: 136 MG/DL (ref 70–110)
GLUCOSE SERPL-MCNC: 142 MG/DL (ref 70–110)
GLUCOSE SERPL-MCNC: 144 MG/DL (ref 70–110)
GRAM STN SPEC: NORMAL
HAEMOPHILUS INFLUENZAE: NOT DETECTED
HCT VFR BLD AUTO: 20.7 % (ref 40–54)
HCT VFR BLD AUTO: 21.6 % (ref 40–54)
HGB BLD-MCNC: 7 G/DL (ref 14–18)
HGB BLD-MCNC: 7.3 G/DL (ref 14–18)
IMM GRANULOCYTES # BLD AUTO: 0.12 K/UL (ref 0–0.04)
IMM GRANULOCYTES NFR BLD AUTO: 1 % (ref 0–0.5)
IMP GENE: NOT DETECTED
INR PPP: 1.3 (ref 0.8–1.2)
KLEBSIELLA AEROGENES: NOT DETECTED
KLEBSIELLA OXYTOCA: NOT DETECTED
KLEBSIELLA PNEUMONIAE GROUP: NOT DETECTED
KPC: NOT DETECTED
LACTATE SERPL-SCNC: 1 MMOL/L (ref 0.5–1.9)
LISTERIA MONOCYTOGENES: NOT DETECTED
LYMPHOCYTES # BLD AUTO: 0.5 K/UL (ref 1–4.8)
LYMPHOCYTES NFR BLD: 4.2 % (ref 18–48)
MAGNESIUM SERPL-MCNC: 1.8 MG/DL (ref 1.6–2.6)
MCH RBC QN AUTO: 33.3 PG (ref 27–31)
MCH RBC QN AUTO: 33.8 PG (ref 27–31)
MCHC RBC AUTO-ENTMCNC: 33.8 G/DL (ref 32–36)
MCHC RBC AUTO-ENTMCNC: 33.8 G/DL (ref 32–36)
MCR-1: NOT DETECTED
MCV RBC AUTO: 100 FL (ref 82–98)
MCV RBC AUTO: 99 FL (ref 82–98)
MEC A/C AND MREJ (MRSA): NOT DETECTED
MEC A/C: NOT DETECTED
MONOCYTES # BLD AUTO: 0.3 K/UL (ref 0.3–1)
MONOCYTES NFR BLD: 2.1 % (ref 4–15)
NDM GENE: NOT DETECTED
NEISSERIA MENINGITIDIS: NOT DETECTED
NEUTROPHILS # BLD AUTO: 10.7 K/UL (ref 1.8–7.7)
NEUTROPHILS NFR BLD: 91.1 % (ref 38–73)
NRBC BLD-RTO: 0 /100 WBC
OXA-48-LIKE: NOT DETECTED
PHOSPHATE SERPL-MCNC: 3.4 MG/DL (ref 2.7–4.5)
PLATELET # BLD AUTO: 262 K/UL (ref 150–450)
PLATELET # BLD AUTO: 272 K/UL (ref 150–450)
PMV BLD AUTO: 10.2 FL (ref 9.2–12.9)
PMV BLD AUTO: 10.2 FL (ref 9.2–12.9)
POTASSIUM SERPL-SCNC: 3.7 MMOL/L (ref 3.5–5.1)
PROT SERPL-MCNC: 4.5 G/DL (ref 6–8.4)
PROTEUS SPECIES: NOT DETECTED
PROTHROMBIN TIME: 13.2 SEC (ref 9–12.5)
PROTHROMBIN TIME: 13.3 SEC (ref 9–12.5)
PROTHROMBIN TIME: 13.5 SEC (ref 9–12.5)
PSEUDOMONAS AERUGINOSA: NOT DETECTED
RBC # BLD AUTO: 2.1 M/UL (ref 4.6–6.2)
RBC # BLD AUTO: 2.16 M/UL (ref 4.6–6.2)
SALMONELLA SP: NOT DETECTED
SERRATIA MARCESCENS: NOT DETECTED
SODIUM SERPL-SCNC: 139 MMOL/L (ref 136–145)
STAPHYLOCOCCUS AUREUS: NOT DETECTED
STAPHYLOCOCCUS EPIDERMIDIS: NOT DETECTED
STAPHYLOCOCCUS LUGDUNESIS: NOT DETECTED
STAPHYLOCOCCUS SPECIES: NOT DETECTED
STENOTROPHOMONAS MALTOPHILIA: NOT DETECTED
STREPTOCOCCUS AGALACTIAE: NOT DETECTED
STREPTOCOCCUS PNEUMONIAE: NOT DETECTED
STREPTOCOCCUS PYOGENES: NOT DETECTED
STREPTOCOCCUS SPECIES: NOT DETECTED
VAN A/B: NOT DETECTED
VIM GENE: NOT DETECTED
WBC # BLD AUTO: 10.14 K/UL (ref 3.9–12.7)
WBC # BLD AUTO: 11.76 K/UL (ref 3.9–12.7)

## 2023-04-21 PROCEDURE — 99900031 HC PATIENT EDUCATION (STAT)

## 2023-04-21 PROCEDURE — 99233 SBSQ HOSP IP/OBS HIGH 50: CPT | Mod: ,,, | Performed by: STUDENT IN AN ORGANIZED HEALTH CARE EDUCATION/TRAINING PROGRAM

## 2023-04-21 PROCEDURE — 85027 COMPLETE CBC AUTOMATED: CPT | Performed by: STUDENT IN AN ORGANIZED HEALTH CARE EDUCATION/TRAINING PROGRAM

## 2023-04-21 PROCEDURE — 83735 ASSAY OF MAGNESIUM: CPT | Performed by: SURGERY

## 2023-04-21 PROCEDURE — 99233 PR SUBSEQUENT HOSPITAL CARE,LEVL III: ICD-10-PCS | Mod: ,,, | Performed by: FAMILY MEDICINE

## 2023-04-21 PROCEDURE — 63600175 PHARM REV CODE 636 W HCPCS: Performed by: SURGERY

## 2023-04-21 PROCEDURE — 20000000 HC ICU ROOM

## 2023-04-21 PROCEDURE — 85025 COMPLETE CBC W/AUTO DIFF WBC: CPT | Performed by: SURGERY

## 2023-04-21 PROCEDURE — 94761 N-INVAS EAR/PLS OXIMETRY MLT: CPT

## 2023-04-21 PROCEDURE — 25000003 PHARM REV CODE 250: Performed by: STUDENT IN AN ORGANIZED HEALTH CARE EDUCATION/TRAINING PROGRAM

## 2023-04-21 PROCEDURE — 85610 PROTHROMBIN TIME: CPT | Mod: 91 | Performed by: SURGERY

## 2023-04-21 PROCEDURE — 87040 BLOOD CULTURE FOR BACTERIA: CPT | Performed by: STUDENT IN AN ORGANIZED HEALTH CARE EDUCATION/TRAINING PROGRAM

## 2023-04-21 PROCEDURE — 99233 PR SUBSEQUENT HOSPITAL CARE,LEVL III: ICD-10-PCS | Mod: ,,, | Performed by: STUDENT IN AN ORGANIZED HEALTH CARE EDUCATION/TRAINING PROGRAM

## 2023-04-21 PROCEDURE — 25000003 PHARM REV CODE 250: Performed by: SURGERY

## 2023-04-21 PROCEDURE — 97605 NEG PRS WND THER DME<=50SQCM: CPT

## 2023-04-21 PROCEDURE — 36415 COLL VENOUS BLD VENIPUNCTURE: CPT | Performed by: STUDENT IN AN ORGANIZED HEALTH CARE EDUCATION/TRAINING PROGRAM

## 2023-04-21 PROCEDURE — 25000003 PHARM REV CODE 250: Performed by: FAMILY MEDICINE

## 2023-04-21 PROCEDURE — 83605 ASSAY OF LACTIC ACID: CPT | Performed by: STUDENT IN AN ORGANIZED HEALTH CARE EDUCATION/TRAINING PROGRAM

## 2023-04-21 PROCEDURE — 99900035 HC TECH TIME PER 15 MIN (STAT)

## 2023-04-21 PROCEDURE — 94799 UNLISTED PULMONARY SVC/PX: CPT

## 2023-04-21 PROCEDURE — 82962 GLUCOSE BLOOD TEST: CPT

## 2023-04-21 PROCEDURE — 85610 PROTHROMBIN TIME: CPT | Performed by: SURGERY

## 2023-04-21 PROCEDURE — 80053 COMPREHEN METABOLIC PANEL: CPT | Performed by: SURGERY

## 2023-04-21 PROCEDURE — 84100 ASSAY OF PHOSPHORUS: CPT | Performed by: SURGERY

## 2023-04-21 PROCEDURE — 99233 SBSQ HOSP IP/OBS HIGH 50: CPT | Mod: ,,, | Performed by: FAMILY MEDICINE

## 2023-04-21 PROCEDURE — 63600175 PHARM REV CODE 636 W HCPCS: Performed by: STUDENT IN AN ORGANIZED HEALTH CARE EDUCATION/TRAINING PROGRAM

## 2023-04-21 RX ORDER — CALCITRIOL 0.25 UG/1
0.5 CAPSULE ORAL DAILY
Status: DISCONTINUED | OUTPATIENT
Start: 2023-04-21 | End: 2023-04-28 | Stop reason: HOSPADM

## 2023-04-21 RX ORDER — ASPIRIN 81 MG/1
81 TABLET ORAL DAILY
Status: DISCONTINUED | OUTPATIENT
Start: 2023-04-21 | End: 2023-04-24

## 2023-04-21 RX ORDER — FAMOTIDINE 20 MG/1
20 TABLET, FILM COATED ORAL DAILY
Status: DISCONTINUED | OUTPATIENT
Start: 2023-04-21 | End: 2023-04-28 | Stop reason: HOSPADM

## 2023-04-21 RX ORDER — LANOLIN ALCOHOL/MO/W.PET/CERES
1 CREAM (GRAM) TOPICAL DAILY
Status: DISCONTINUED | OUTPATIENT
Start: 2023-04-21 | End: 2023-04-28 | Stop reason: HOSPADM

## 2023-04-21 RX ORDER — AMIODARONE HYDROCHLORIDE 200 MG/1
200 TABLET ORAL 2 TIMES DAILY
Status: DISCONTINUED | OUTPATIENT
Start: 2023-04-21 | End: 2023-04-28 | Stop reason: HOSPADM

## 2023-04-21 RX ORDER — MIDODRINE HYDROCHLORIDE 2.5 MG/1
5 TABLET ORAL 3 TIMES DAILY
Status: DISCONTINUED | OUTPATIENT
Start: 2023-04-21 | End: 2023-04-28 | Stop reason: HOSPADM

## 2023-04-21 RX ORDER — ATORVASTATIN CALCIUM 40 MG/1
80 TABLET, FILM COATED ORAL DAILY
Status: DISCONTINUED | OUTPATIENT
Start: 2023-04-21 | End: 2023-04-28 | Stop reason: HOSPADM

## 2023-04-21 RX ORDER — ALLOPURINOL 100 MG/1
100 TABLET ORAL NIGHTLY
Status: DISCONTINUED | OUTPATIENT
Start: 2023-04-21 | End: 2023-04-28 | Stop reason: HOSPADM

## 2023-04-21 RX ORDER — FAMOTIDINE 20 MG/1
40 TABLET, FILM COATED ORAL DAILY
Status: DISCONTINUED | OUTPATIENT
Start: 2023-04-21 | End: 2023-04-21

## 2023-04-21 RX ADMIN — PIPERACILLIN AND TAZOBACTAM 4.5 G: 4; .5 INJECTION, POWDER, LYOPHILIZED, FOR SOLUTION INTRAVENOUS at 01:04

## 2023-04-21 RX ADMIN — POTASSIUM CHLORIDE 40 MEQ: 7.46 INJECTION, SOLUTION INTRAVENOUS at 05:04

## 2023-04-21 RX ADMIN — AMIODARONE HYDROCHLORIDE 200 MG: 200 TABLET ORAL at 09:04

## 2023-04-21 RX ADMIN — COLLAGENASE SANTYL: 250 OINTMENT TOPICAL at 04:04

## 2023-04-21 RX ADMIN — ASPIRIN 81 MG: 81 TABLET, COATED ORAL at 02:04

## 2023-04-21 RX ADMIN — MEROPENEM 1 G: 1 INJECTION, POWDER, FOR SOLUTION INTRAVENOUS at 09:04

## 2023-04-21 RX ADMIN — CHLORHEXIDINE GLUCONATE 15 ML: 1.2 RINSE ORAL at 08:04

## 2023-04-21 RX ADMIN — SODIUM CHLORIDE: 0.9 INJECTION, SOLUTION INTRAVENOUS at 09:04

## 2023-04-21 RX ADMIN — MELATONIN 6 MG: at 09:04

## 2023-04-21 RX ADMIN — ATORVASTATIN CALCIUM 80 MG: 40 TABLET, FILM COATED ORAL at 09:04

## 2023-04-21 RX ADMIN — MIDODRINE HYDROCHLORIDE 5 MG: 2.5 TABLET ORAL at 02:04

## 2023-04-21 RX ADMIN — ALLOPURINOL 100 MG: 100 TABLET ORAL at 09:04

## 2023-04-21 RX ADMIN — PIPERACILLIN AND TAZOBACTAM 4.5 G: 4; .5 INJECTION, POWDER, LYOPHILIZED, FOR SOLUTION INTRAVENOUS at 09:04

## 2023-04-21 RX ADMIN — MAGNESIUM SULFATE HEPTAHYDRATE 2 G: 40 INJECTION, SOLUTION INTRAVENOUS at 05:04

## 2023-04-21 RX ADMIN — MIDODRINE HYDROCHLORIDE 5 MG: 2.5 TABLET ORAL at 09:04

## 2023-04-21 RX ADMIN — FAMOTIDINE 20 MG: 20 TABLET ORAL at 02:04

## 2023-04-21 RX ADMIN — AMIODARONE HYDROCHLORIDE 200 MG: 200 TABLET ORAL at 02:04

## 2023-04-21 RX ADMIN — CHLORHEXIDINE GLUCONATE 15 ML: 1.2 RINSE ORAL at 09:04

## 2023-04-21 RX ADMIN — NOREPINEPHRINE BITARTRATE 0.04 MCG/KG/MIN: 4 INJECTION, SOLUTION INTRAVENOUS at 06:04

## 2023-04-21 NOTE — OP NOTE
DATE OF PROCEDURE: 04/20/2023    PREOPERATIVE DIAGNOSIS: Sacral decubitus    POSTOPERATIVE DIAGNOSIS: Stage IV sacral decubitus    PROCEDURE: Debridement of sacral decubitus wound to include skin, subcutaneous fat, muscle and fascia    SURGEON: Mark Martinez M.D    ASSISTANT: None    ANESTHESIA: General    ESTIMATED BLOOD LOSS: 50 cc    SPECIMEN: Cultures    CONDITION: Guarded    COMPLICATIONS: None    FINDINGS:   Initial external wound appeared to be 5 x 7 cm however after excising this he was found to have significant amount of underlying necrosis which extended through the subcutaneous fat and muscle layers down to the level of the overlying fascia from the sacrum. Final wound measured 10 x 12 x 6 cm with exposed sacral bone at the base.    INDICATIONS: Sepsis    PROCEDURE IN DETAIL: Consent was obtained from family.  Patient was taken the operating room kept in his ICU bed.  General anesthesia was induced and a ET tube was placed.  He was then moved to the right lateral decubitus position with appropriate padding.  Were able to expose his sacral area. He had a 5 x 7 area of unstageable sacral decubitus present.  This area was prepped with Betadine and draped in a sterile fashion. He was on around the clock antibiotics.  Time-out performed by members of the operative team.  Initially began by incising around the nonviable eschar. Were immediately met with foul-smelling turbid fluid and it was apparent there was significant amount of necrotic tissue underneath. We continued debridement of skin, dermis, subcutaneous fat utilizing scissors and electrocautery. This extended into the muscle layer and down to the level of the sacrum to include the overlying fascia. Cultures were taken. After debridement all readily accessible nonviable tissue we did encounter some normal tissue with adequate blood supply.  This wound did extend anteriorly towards the anus and rectum.  It extended all the way down to the level of  the underlying sacrum and coccyx. Final measurements were 10 x 12 x 6 cm.  Hemostasis was obtained with electrocautery. Kerlix gauze soaked in Betadine and peroxide was then inserted into the wound bed is covered with ABD pads which were taped down.  Patient was then placed back in supine position, extubated taken to the ICU in guarded condition.  All counts were correct x2 the case. I was present scrubbed throughout all operative portions of the case.    DISPO: Returned to ICU care

## 2023-04-21 NOTE — PLAN OF CARE
04/21/23 0800   Patient Assessment/Suction   Level of Consciousness (AVPU) alert   Respiratory Effort Unlabored;Normal   Expansion/Accessory Muscles/Retractions no use of accessory muscles   All Lung Fields Breath Sounds clear;equal bilaterally   Rhythm/Pattern, Respiratory pattern regular;depth regular   Cough Frequency no cough   PRE-TX-O2   Device (Oxygen Therapy) room air   SpO2 95 %   Pulse Oximetry Type Continuous   $ Pulse Oximetry - Multiple Charge Pulse Oximetry - Multiple   Oximetry Probe Site Assessed   Pulse 61   Resp 19   /66   Education   $ Education DME Oxygen;15 min   Respiratory Evaluation   $ Care Plan Tech Time 15 min   $ Eval/Re-eval Charges Re-evaluation

## 2023-04-21 NOTE — RESPIRATORY THERAPY
04/20/23 1935   Patient Assessment/Suction   Level of Consciousness (AVPU) alert   Respiratory Effort Normal;Unlabored   Expansion/Accessory Muscles/Retractions no use of accessory muscles   Rhythm/Pattern, Respiratory unlabored;pattern regular;depth regular;no shortness of breath reported   PRE-TX-O2   Device (Oxygen Therapy) nasal cannula   Flow (L/min) 2   SpO2 97 %   Pulse Oximetry Type Continuous   $ Pulse Oximetry - Multiple Charge Pulse Oximetry - Multiple   Pulse 72   Resp 20   Education   $ Education DME Oxygen;15 min   Respiratory Evaluation   $ Care Plan Tech Time 15 min   $ Eval/Re-eval Charges Re-evaluation

## 2023-04-21 NOTE — PLAN OF CARE
Pt remains in ICU.  On and off levo through out the day today.  Midodrine this afternoon.  More awake, alert, confused this evening.  More active.  Hypotensive when asleep.  IVF now up to 125cc/hr.  Wound vac placed this afternoon.  If becomes soiled or unable to maintain suction, remove vac, place santyl in wound bed and pack with moist kerlex and cover with mepilex.  Family updated this morning.

## 2023-04-21 NOTE — PROGRESS NOTES
Critical access hospital Medicine  Progress Note    Patient name: Richard Bustos  MRN: [id number]  Admit Date: 4/19/2023   LOS: 2 days     SUBJECTIVE:     Principal problem: Septic shock  Chief Complaint   Patient presents with    Shortness of Breath       Interval History:  Received surgical debridement yesterday.  He had extensive necrosis and infection of his sacral wound.  He is getting wound VAC placed but will likely need diverting colostomy for adequate wound VAC function and to give him the best chance of healing his sacral wound.  Will discuss further with surgery.  Continuing antibiotics.  He was still remains on Levophed will try to wean this off today    Scheduled Meds:   chlorhexidine  15 mL Mouth/Throat BID    piperacillin-tazobactam (ZOSYN) IVPB  4.5 g Intravenous Q8H     Continuous Infusions:   sodium chloride 0.9% 100 mL/hr at 04/21/23 0733    NORepinephrine bitartrate-D5W 0.03 mcg/kg/min (04/21/23 0931)    NORepinephrine bitartrate-D5W 0.22 mcg/kg/min (04/19/23 1000)     PRN Meds:sodium chloride, acetaminophen, calcium gluconate IVPB, calcium gluconate IVPB, calcium gluconate IVPB, dextrose 50%, dextrose 50%, glucagon (human recombinant), glucose, glucose, insulin aspart U-100, LIDOcaine HCl 2%, magnesium sulfate IVPB, magnesium sulfate IVPB, melatonin, ondansetron, polyethylene glycol, potassium chloride **AND** potassium chloride **AND** potassium chloride, sodium phosphate IVPB, sodium phosphate IVPB, sodium phosphate IVPB    Review of patient's allergies indicates:   Allergen Reactions    Donepezil Other (See Comments)     AMS    Bactroban [mupirocin calcium] Blisters     Causes Blisters    Shellfish containing products Other (See Comments)     Other reaction(s): Gout  OYSTERS       Review of Systems: As per interval history    OBJECTIVE:     Vital Signs (Most Recent)  Temp: 97.7 °F (36.5 °C) (04/21/23 0715)  Pulse: 71 (04/21/23 1000)  Resp: (!) 23 (04/21/23 1000)  BP: (!) 102/58  (04/21/23 1000)  SpO2: 98 % (04/21/23 1000)    Vital Signs Range (Last 24H):  Temp:  [97.7 °F (36.5 °C)-99.4 °F (37.4 °C)]   Pulse:  [57-87]   Resp:  [15-40]   BP: (101-159)/()   SpO2:  [89 %-100 %]   Arterial Line BP: (103-176)/(39-63)     I & O (Last 24H):  Intake/Output Summary (Last 24 hours) at 4/21/2023 1129  Last data filed at 4/21/2023 0909  Gross per 24 hour   Intake 2669.17 ml   Output 2040 ml   Net 629.17 ml       Physical Exam:  General: Patient resting comfortably in no acute distress. Appears as stated age. Calm  Eyes: No conjunctival injection. No scleral icterus.  ENT: Hearing grossly intact. No discharge from ears. No nasal discharge.   Neck: Supple, trachea midline. No JVD  CVS: RRR. No LE edema BL  Lungs: CTA BL, no wheezing or crackles. Good breath sounds. No accessory muscle use. No acute respiratory distress  Abdomen:  Soft, nontender and nondistended.  No organomegaly  Neuro: AOx3. Moves all extremities. Follows commands. Responds appropriately   Skin:  sacral decub, bilateral feet with skin decay    Laboratory:  I have reviewed all pertinent lab results within the past 24 hours.    Diagnostic Results:       ASSESSMENT/PLAN:     Pt is a 76 y/o with the below medical problems, admitted with septic shock due to infected sacral decubitus ulcer    Active Hospital Problems    Diagnosis  POA    *Septic shock [A41.9, R65.21]  Yes    Pressure injury of sacral region, unstageable [L89.150]  Yes    Supratherapeutic INR [R79.1]  Yes    Anemia [D64.9]  Yes    Multiple skin tears [T14.8XXA]  Yes    Closed nondisplaced fracture of sixth cervical vertebra [S12.501A]  Yes    Dementia without behavioral disturbance [F03.90]  Yes    EMMY (acute kidney injury) [N17.9]  Yes     Chronic    Chronic systolic congestive heart failure [I50.22]  Yes    Peripheral vascular disease [I73.9]  Yes    Paroxysmal atrial fibrillation [I48.0]  Yes     EKG stable  Stable on Coreg, no missed doses of anticoagulation.  Continue anticoagulation as described below        BPH with obstruction/lower urinary tract symptoms [N40.1, N13.8]  Yes    Diabetic neuropathy [E11.40]  Yes    OA (osteoarthritis). post bilateral THR, 2001 [M19.90]  Yes    Long term (current) use of anticoagulants [Z79.01]  Not Applicable     Risk vs benefit of anticoagulation medication discussed at length with patient and daughter.  With his history of MTHFR, a fib and hypercoagulability the benefit preventing of thrombus formation/stent reocclusion is difficult to balance with risk of bleeding.  That said, we will continue anticoagulation and reassess if further episodes of bleeding occur.   Monitor.        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         Plan:   - continue broad abx appreciate ID recs  - repeat CBC this pm and transfuse if dropping further  - IVF resuscitation  - wean off of LEvophed  - send lactic acid.   - hold AC at this time.   - continue vancomycin and merrem  - follow culture results  - will follow up with Dr Martinez regarding possible need for colostomy  - appreciate wound care  - diet  - appreciate surgery recommendations  - appreciate ID recommendations      VTE Risk Mitigation (From admission, onward)           Ordered     Place KURTIS hose  Until discontinued         04/19/23 0157     IP VTE HIGH RISK  PATIENT  Once         04/19/23 0157                      Department Hospital Medicine  [facility]  Brennen Castillo MD  Date of service: 04/21/2023

## 2023-04-21 NOTE — PLAN OF CARE
Problem: Fluid and Electrolyte Imbalance (Acute Kidney Injury/Impairment)  Goal: Fluid and Electrolyte Balance  Outcome: Ongoing, Progressing     Problem: Oral Intake Inadequate (Acute Kidney Injury/Impairment)  Goal: Optimal Nutrition Intake  Outcome: Ongoing, Progressing     Problem: Renal Function Impairment (Acute Kidney Injury/Impairment)  Goal: Effective Renal Function  Outcome: Ongoing, Progressing     Problem: Adjustment to Illness (Sepsis/Septic Shock)  Goal: Optimal Coping  Outcome: Ongoing, Progressing     Problem: Bleeding (Sepsis/Septic Shock)  Goal: Absence of Bleeding  Outcome: Ongoing, Progressing     Problem: Infection Progression (Sepsis/Septic Shock)  Goal: Absence of Infection Signs and Symptoms  Outcome: Ongoing, Progressing     Problem: Nutrition Impaired (Sepsis/Septic Shock)  Goal: Optimal Nutrition Intake  Outcome: Ongoing, Progressing     Problem: Infection  Goal: Absence of Infection Signs and Symptoms  Outcome: Ongoing, Progressing     Problem: Impaired Wound Healing  Goal: Optimal Wound Healing  Outcome: Ongoing, Progressing     Problem: Fall Injury Risk  Goal: Absence of Fall and Fall-Related Injury  Outcome: Ongoing, Progressing     Problem: Skin Injury Risk Increased  Goal: Skin Health and Integrity  Outcome: Ongoing, Progressing     Problem: Malnutrition  Goal: Improved Nutritional Intake  Outcome: Ongoing, Progressing

## 2023-04-21 NOTE — PROGRESS NOTES
Pharmacist Renal Dose Adjustment Note    Richard Bustos is a 77 y.o. male being treated with the medication Famotidine    Patient Data:    Vital Signs (Most Recent):  Temp: 97.7 °F (36.5 °C) (04/21/23 0715)  Pulse: 71 (04/21/23 1000)  Resp: (!) 23 (04/21/23 1000)  BP: (!) 102/58 (04/21/23 1000)  SpO2: 98 % (04/21/23 1000)   Vital Signs (72h Range):  Temp:  [97.7 °F (36.5 °C)-99.4 °F (37.4 °C)]   Pulse:  []   Resp:  [12-42]   BP: ()/()   SpO2:  [71 %-100 %]   Arterial Line BP: (0-176)/(0-63)      Recent Labs   Lab 04/19/23  1055 04/20/23  0434 04/21/23  0340   CREATININE 2.2* 2.0* 1.7*     Serum creatinine: 1.7 mg/dL (H) 04/21/23 0340  Estimated creatinine clearance: 43.1 mL/min (A)    Medication:famotidine dose: 40 mg frequency daily will be changed to medication:Famotidine dose:20 mg frequency:Daily    Pharmacist's Name: Heather Son  Pharmacist's Extension: [phone]

## 2023-04-21 NOTE — PROGRESS NOTES
[facility]  Adult Nutrition   Progress Note (Follow-Up)    SUMMARY      Recommendations:   1. Continue current diet as tolerated.   2. Continue ONS--Ensure HP pudding with meals and Landon BID, thickened to [place] for wound healing needs.   3. Continue MVI with minerals   4. Consider adding Severe PCM to problem list.  5. RD to follow intake, tolerance, labs and bowel function as needed.    Goals:   Goals: 1. Intake to be >/= 75% EEN. 2. Labs trend to target range. 3. No further skin issues.Ongoing    Dietitian Rounds Brief  Patient not alert at visit. Eating well per nursing. Pt is s/p stage IV sacral wound debridement 4/20/23. No new BM? If concern for wound--may consider a rectal tube.RN aware per notes.     Diet order: Dysphagia Mechanical Soft (IDDSI Level 5); Nectar thick liquids.     Oral Supplement: Ensure HP pudding with meals and Landon BID    % Intake of Estimated Energy Needs: 75 - 100 %  % Meal Intake: 75 - 100 %    Estimated/Assessed Needs  Weight Used For Calorie Calculations: 100 kg (220 lb 7.4 oz)  Energy Calorie Requirements (kcal): 0854-5812  Energy Need Method: Kcal/kg  Protein Requirements: 105-140 (1.5-2.0 / kg IBW; 1.0-1.4 CBW)  Weight Used For Protein Calculations: 70 kg (154 lb 5.2 oz) (IBW)  Fluid Requirements (mL): 2000 mL (20 mL/kg)  Estimated Fluid Requirement Method: RDA Method  RDA Method (mL): 2000       Weight History:  Wt Readings from Last 5 Encounters:   04/19/23 100.4 kg (221 lb 5.5 oz)   04/18/23 104.3 kg (230 lb)   04/03/23 104 kg (229 lb 4.5 oz)   03/26/23 112 kg (246 lb 14.6 oz)   03/23/23 111.1 kg (244 lb 14.9 oz)        Reason for Assessment  Reason For Assessment: consult  Diagnosis: infection/sepsis  Relevant Medical History: HTN, CAD s/p cardiac stents, lumbar DDD with chronic bilat LE weakness, HLD, gout, MI, Chronic SHF, arthritis, A-fib on Coumadin, DM II, dementia, and MTHFR mutation, and large non-healing sacral wound    Medications:Pertinent  Medications Reviewed  Scheduled Meds:   allopurinoL  100 mg Oral QHS    amiodarone  200 mg Oral BID    aspirin  81 mg Oral Daily    atorvastatin  80 mg Oral Daily    calcitRIOL  0.5 mcg Oral Daily    chlorhexidine  15 mL Mouth/Throat BID    collagenase   Topical (Top) Daily    famotidine  20 mg Oral Daily    ferrous sulfate  1 tablet Oral Daily    midodrine  5 mg Oral TID    multivitamin  1 tablet Oral Daily    piperacillin-tazobactam (ZOSYN) IVPB  4.5 g Intravenous Q8H     Continuous Infusions:   sodium chloride 0.9% 125 mL/hr at 04/21/23 1301    NORepinephrine bitartrate-D5W Stopped (04/21/23 1443)    NORepinephrine bitartrate-D5W 0.22 mcg/kg/min (04/19/23 1000)     PRN Meds:.sodium chloride, acetaminophen, calcium gluconate IVPB, calcium gluconate IVPB, calcium gluconate IVPB, dextrose 50%, dextrose 50%, glucagon (human recombinant), glucose, glucose, insulin aspart U-100, LIDOcaine HCl 2%, magnesium sulfate IVPB, magnesium sulfate IVPB, melatonin, ondansetron, polyethylene glycol, potassium chloride **AND** potassium chloride **AND** potassium chloride, sodium phosphate IVPB, sodium phosphate IVPB, sodium phosphate IVPB    Labs: Pertinent Labs Reviewed  Clinical Chemistry:  Recent Labs   Lab 04/19/23  1055 04/20/23  0434 04/21/23  0340    141 139   K 3.9 3.7 3.7    112* 115*   CO2 20* 19* 19*   * 110 132*   BUN 60* 55* 48*   CREATININE 2.2* 2.0* 1.7*   CALCIUM 8.1* 8.4* 8.7   PROT 4.8* 4.9* 4.5*   ALBUMIN 1.7* 1.8* 1.6*   BILITOT 0.8 1.2* 1.0   ALKPHOS 97 299* 303*   AST 38 357* 215*   ALT 35 192* 149*   ANIONGAP 14 10 5*   MG 1.8 1.7 1.8   PHOS 3.6 3.4 3.4     CBC:   Recent Labs   Lab 04/21/23  1247   WBC 10.14   RBC 2.16*   HGB 7.3*   HCT 21.6*      *   MCH 33.8*   MCHC 33.8     Lipid Panel:  No results for input(s): CHOL, HDL, LDLCALC, TRIG, CHOLHDL in the last 168 hours.  Cardiac Profile:  Recent Labs   Lab 04/18/23  1328   TROPONINI 0.101*     Inflammatory Labs:  Recent Labs    Lab 04/18/23  1328   .4*     Diabetes:  Recent Labs   Lab 04/19/23  0215   HGBA1C 5.4     Thyroid & Parathyroid:  No results for input(s): TSH, FREET4, M9JSIBC, X0BIREG, THYROIDAB in the last 168 hours.    Monitor and Evaluation  Food and Nutrient Intake: energy intake, food and beverage intake  Food and Nutrient Adminstration: diet order  Knowledge/Beliefs/Attitudes: food and nutrition knowledge/skill  Physical Activity and Function: nutrition-related ADLs and IADLs  Anthropometric Measurements: weight change, body mass index, weight  Biochemical Data, Medical Tests and Procedures: electrolyte and renal panel, gastrointestinal profile, glucose/endocrine profile, inflammatory profile, lipid profile  Nutrition-Focused Physical Findings: overall appearance     Nutrition Risk  Level of Risk/Frequency of Follow-up: high     Nutrition Follow-Up  RD Follow-up?: Yes    Carrie Butcher RD 04/21/2023 3:48 PM

## 2023-04-21 NOTE — PLAN OF CARE
Patient is continues to require hospitalization.       04/21/23 1540   Discharge Reassessment   Assessment Type Discharge Planning Reassessment   Did the patient's condition or plan change since previous assessment? Yes   Discharge Plan A Skilled Nursing Facility   Discharge Plan B Skilled Nursing Facility   DME Needed Upon Discharge  none   Why the patient remains in the hospital Requires continued medical care

## 2023-04-21 NOTE — PROGRESS NOTES
Progress Note  Infectious Disease    Reason for Consult:  Septic shock     HPI: Richard Bustos is a 77 y.o. male obese, BMI 33/6 Kg/m2, with past medical history of HTN, DM, CAD s/p MI and cardiac stents, AFib, MTHFR mutation on Coumadin, HLD, gout, ongoing dementia and prior fall on 3/17/23 which resulted in head trauma, and cervical fracture with cord edema status post cervical spine fusion on 03/24/2023 by Neurosurgery.  Patient was discharged to [facility] nursing [facility] [place] to continue recovery.  Unfortunately, since he has been bed-bound since his fall, he has developed unstageable sacrococcygeal wound.    Daughters at bedside providing most of the history, patient was found altered and hypotensive at facility yesterday, sent to [facility] ER for further workup.    Patient does not remember what happened yesterday, he is aware he is at the hospital in ICU, denies any fever or chills, no headache, no nausea or vomiting, no cough, no shortness of breath, no abdominal pain, no dysuria or increased urinary frequency, no changes in the color of the urine, reports having diarrhea a few days ago, had a tiny bowel movement earlier today.    At [place], despite IV fluids, patient required Levophed, transferred to [facility] for ICU care.      Labs on admission with leukocytosis of 13, left shift 84%, bands 4%, H&H 9.1/27.4, , platelet count 306   INR 7.8--9.4, very high   EMMY, creatinine 2.5 (baseline 1.2)  .4 at [place], high   Hemoglobin A1c 5.4   U tox negative   UA negative for UTI   Chest x-ray with hypoventilation, mild cardiomegaly.  No acute infiltrates.  CT head negative for acute pathology     Empirically started on vancomycin and cefepime IV.      ID consult for septic shock.    Upon chart reviewed, patient has history of non healing ulcer on L 2nd toe, completed 6 weeks of oral antibiotics with Augmentin for pan-sensitive Porteus mirabilis for possible osteomyelitis.    4/20:  Interim reviewed,  "patient seen examined at bedside, awake, alert, pressors decreased to 0.1, plan for OR later today for sacral decubitus ulcer debridement.  Patient is grateful for the care he has been given at the hospital.  Complaining of bilateral lower extremity pain.  Afebrile.  Labs reviewed, leukocytosis 15.5, left shift 90.8%, no bands, H&H 7.2/20.9, platelet count 275.  INR reversed, 1.3, creatinine 2, trending down, transaminitis /.  Seen by Wound Care yesterday, status post debridement at bedside, wound cultures with GNR, non lactose , pending final.  Micro at [place], blood cultures x2 from 04/18, no growth to date, pending final.    4/21:  Interim reviewed, patient status post I&D by surgery yesterday, as per op-note:  Sacral wound was 5 x 7 cm and was found to have significant amount of underlying necrosis which extended through the subcutaneous fat and muscle layers down to the level of the overlying fascia from the sacrum.  Final wound measured 10 x 12 x 6 cm.  He is still in ICU, recovering, on minimal dose pressors Levophed 0.03, awake, alert, states he is feeling good, about to have breakfast.  Labs reviewed, white count 11.7, left shift 91.1%, improving, H&H 7/20.7, MCV 99, platelet count 262.  INR 1.3.  Kidney function trending down creatinine 1.7, elevated LFTs trending down to 15/149.  Micro reviewed, wound cultures from bedside debridement pansensitive Proteus mirabilis.  OR cultures Gram stain with few WBC, few Gram-positive cocci, rare Gram-negative rods, pending final.     Antibiotics (From admission, onward)      Start     Stop Route Frequency Ordered    04/20/23 1730  vancomycin 1.5 g in dextrose 5 % 250 mL IVPB (ready to mix)         -- IV Every 24 hours (non-standard times) 04/20/23 1626    04/19/23 1000  meropenem 1 g in sodium chloride 0.9 % 100 mL IVPB (ready to mix system)        Note to Pharmacy: Ht: 5' 8" (1.727 m)  Wt: 100.4 kg (221 lb 5.5 oz)  Estimated Creatinine " Clearance: 30.5 mL/min (A) (based on SCr of 2.4 mg/dL (H)).   Body mass index is 33.65 kg/m².    -- IV Every 12 hours (non-standard times) 04/19/23 0917    04/19/23 0252  vancomycin - pharmacy to dose  (Skin & Soft Tissue Infection: Non-Purulent, Severe)        See Hyperspace for full Linked Orders Report.    -- IV pharmacy to manage frequency 04/19/23 0157              Review of patient's allergies indicates:   Allergen Reactions    Donepezil Other (See Comments)     AMS    Bactroban [mupirocin calcium] Blisters     Causes Blisters    Shellfish containing products Other (See Comments)     Other reaction(s): Gout  OYSTERS     Past Medical History:   Diagnosis Date    Anemia     Anemia of other chronic disease 09/13/2017    Anemia, chronic renal failure 09/13/2017    Anemia, unspecified 09/13/2017    Anticoagulant long-term use     Aorta aneurysm     Arthritis     Atrial fibrillation     Bacteremia due to Streptococcus 01/08/2022    CAD (coronary artery disease)     CHF (congestive heart failure)     Chronic kidney disease     Clotting disorder 09/13/2017    Colon polyp     Dementia     Diabetes mellitus     not on meds    Diabetes mellitus type II     Diverticulosis     Elevated PSA     Encounter for blood transfusion     Former smoker     General anesthetics causing adverse effect in therapeutic use     TROUBLE COMING OUT OF ANESTHESIA WITH GASTRIC BYPASS    GERD (gastroesophageal reflux disease)     Gout     Hx of colonic polyps     Hypercoagulable state     Hyperlipidemia     Hypertension     MI, old 2010    MTHFR mutation     Myocardial infarction     9/2010    Osteomyelitis of ankle or foot 03/09/2017    Prostate cancer 06/2016    Sleep apnea     Squamous cell carcinoma 01/23/2018    Left helix, imiquimod    Venous stasis     Chronic     Past Surgical History:   Procedure Laterality Date    ABDOMINAL SURGERY      CARDIAC SURGERY      3 STENTS     CAUDAL EPIDURAL STEROID  INJECTION N/A 6/22/2020    Procedure: INJECTION, STEROID, SPINE, EPIDURAL, CAUDAL;  Surgeon: Evangelist Bose MD;  Location: [facility] OR;  Service: Pain Management;  Laterality: N/A;    COLONOSCOPY  02/2011    diverticulosis with diverticula with clot, no active bleeding    COLONOSCOPY N/A 8/24/2016    Procedure: COLONOSCOPY;  Surgeon: Saroj Borjas MD;  Location: [facility] ENDO;  Service: Endoscopy;  Laterality: N/A;    COLONOSCOPY N/A 11/18/2020    Procedure: COLONOSCOPY;  Surgeon: Saroj Borjas MD;  Location: [facility] ENDO;  Service: Endoscopy;  Laterality: N/A;    COLONOSCOPY N/A 10/27/2021    Procedure: COLONOSCOPY;  Surgeon: Saroj Borjas MD;  Location: [facility] ENDO;  Service: Endoscopy;  Laterality: N/A;    EPIDURAL STEROID INJECTION INTO LUMBAR SPINE N/A 6/22/2020    Procedure: Injection-steroid-epidural-lumbar;  Surgeon: Evangelist Bose MD;  Location: [facility] OR;  Service: Pain Management;  Laterality: N/A;  L3 L4 L5 S1    EPIDURAL STEROID INJECTION INTO LUMBAR SPINE N/A 9/21/2020    Procedure: Injection-steroid-epidural-lumbar;  Surgeon: Evangelist Bose MD;  Location: [facility] OR;  Service: Pain Management;  Laterality: N/A;  L5-S1    FUSION, SPINE, CERVICAL N/A 3/24/2023    Procedure: FUSION, SPINE, CERVICAL;  Surgeon: Kike Kc DO;  Location: [facility] OR;  Service: Neurosurgery;  Laterality: N/A;  posterior cervical decompression/fusion C3-T1    GASTRIC BYPASS  2/5/2008    IVC FILTER RETRIEVAL      JOINT REPLACEMENT  1996 and 2001    bi-lat hip replacement/Rt Hip and Lt Hip    RADIOFREQUENCY ABLATION OF LUMBAR MEDIAL BRANCH NERVE AT SINGLE LEVEL Bilateral 1/10/2020    Procedure: Radiofrequency Ablation, Nerve, Spinal, Lumbar, Medial Branch, 1 Level;  Surgeon: Evangelist Bose MD;  Location: [facility] OR;  Service: Pain Management;  Laterality: Bilateral;  L3,L4,L5    RADIOFREQUENCY ABLATION OF LUMBAR MEDIAL BRANCH NERVE AT SINGLE LEVEL Bilateral 11/23/2021    Procedure: Radiofrequency Ablation, Nerve,  Spinal, Lumbar, Medial Branch, Bilateral L 3,4,5;  Surgeon: Nile Causey MD;  Location: [facility] OR;  Service: Pain Management;  Laterality: Bilateral;    ROTATOR CUFF REPAIR      Rt shoulder    Stents  8/18/2010    x 3    UPPER GASTROINTESTINAL ENDOSCOPY  02/2011     Social History     Tobacco Use    Smoking status: Former    Smokeless tobacco: Never    Tobacco comments:     45 yrs ago, only smoked 3-4 packs in his entire life as a teenager   Substance Use Topics    Alcohol use: Not Currently     Comment: rare        Family History   Problem Relation Age of Onset    Heart attack Mother     Heart attack Father     Heart attack Brother     Ulcerative colitis Daughter 35    Lupus Daughter     Colon cancer Neg Hx     Colon polyps Neg Hx     Crohn's disease Neg Hx     Melanoma Neg Hx     Psoriasis Neg Hx     Eczema Neg Hx        Pertinent medications noted: warfarin     Review of Systems:   No chills, fever, sweats, weight loss  No change in vision, loss of vision or diplopia  No sinus congestion, purulent nasal discharge, post nasal drip or facial pain  No pain in mouth or throat. No problems with teeth, gums.  No chest pain, palpitations, syncope  No cough, sputum production, shortness of breath, dyspnea on exertion, pleurisy, hemoptysis  No dysphagia, odynophagia  No nausea, vomiting, diarrhea, constipation, blood in stool, or focal abd pain  No dysuria, hesitancy, hematuria, retention, incontinence  No swelling of joints, redness of joints, injuries, or new focal pain  No unusual headaches, dizziness, vertigo,prior fall 3/17/23  No anxiety, depression, substance abuse, sleep disturbance  No bleeding, lymphadenopathy  S/p fall with multiple ecchymosis on face/forehead, arms and legs     Outdoor activities: Resident of [facility], former smoker, no alcohol, 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 Alert and attentive, cooperative, comfortable on room air  Eyes:  Anicteric, PERRL, resolving ecchymosis on eyelids b/l  ENT:  Moist oral mucosa, terrible oral hygiene, missing most of his upper teeth, has 3 lower teeth left, no ulcers, exudates, thrush, nares patent  Neck:  Supple  Lungs: Clear to auscultation b/l  Heart:  S1/S2+, regular rhythm, no murmurs  Abd:  Obese, RLQ firm mass palpated, non tender to palpation, +BS, soft, non tender, non distended, no rebound  :  Ruelas, urine clear  Musc:  Except for R great toe, joints without effusion, swelling,  erythema, synovitis, non-ambulatory  Skin:  Warm, resolving ecchymosis on forehead, face, upper extremities and shins  Wounds: Sacral wound s/p extensive debridement  4/20 Sacral wound with dressing in place, not disturbed   4/19: Sacrococcygeal unstageable foul-smelling wound, R great toe with 2 non-healing ulcers, with surrounding redness c/w cellulitis  L foot with lateral unsteageable wound  Neuro:  Following commands, speech is clear, moving all extremities, RUE 5/5, LUE 3/5, RLE 4/5, LLE 4/5  Psych:  Calm, cooperative  Lymphatic:       Extrem: B/l legs with dressing in placed, not disturbed   VAD:  R femoral line 4/18    R a-line 4/18    L peripheral IV      Isolation: None    4/21:        4/19:      Sacrum             Sacrum                  Resolving ecchymosis face      R great toe  L lateral foot - unstageable wound    General Labs reviewed:  Recent Labs   Lab 04/19/23  1118 04/20/23  0434 04/21/23  0340   WBC 13.09* 15.52* 11.76   HGB 7.2* 7.2* 7.0*   HCT 21.9* 20.9* 20.7*     275 262       Recent Labs   Lab 04/19/23  1055 04/20/23  0434 04/21/23  0340    141 139   K 3.9 3.7 3.7    112* 115*   CO2 20* 19* 19*   BUN 60* 55* 48*   CREATININE 2.2* 2.0* 1.7*   CALCIUM 8.1* 8.4* 8.7   PROT 4.8* 4.9* 4.5*   BILITOT 0.8 1.2* 1.0   ALKPHOS 97 299* 303*   ALT 35 192* 149*   AST 38 357* 215*     Recent Labs   Lab 04/18/23  1328   .4*     No results for input(s): SEDRATE in the last 168 hours.    Estimated Creatinine Clearance: 43.1 mL/min (A) (based on SCr of 1.7 mg/dL (H)).       Prior Micro:   Wound cultures left foot 10/18/2022 pansensitive Proteus mirabilis  Wound cultures left foot 08/24/2022 pansensitive Proteus mirabilis, and Porphyromonas somerae    Micro:  Blood cultures x 2 4/18 at [facility] 1/4 bottles GPR, not identified on biofire, pending final     Blood culture [[id number]] Collected: 04/18/23 1732   Order Status: Completed Specimen: Blood from Antecubital, Right Updated: 04/20/23 2212    Blood Culture, Routine No Growth to date     No Growth to date     No Growth to date   Blood culture [[id number]] Collected: 04/18/23 1659   Order Status: Completed Specimen: Blood from Antecubital, Right Arm Updated: 04/21/23 0424    Blood Culture, Routine Gram stain miroslava bottle: Gram positive rods     Results called to and read back by:Dipesh Gotti RN 04/21/2023     04:23   Rapid Organism ID by PCR (from Blood culture) [[id number]] Collected: 04/18/23 1659   Order Status: Completed Updated: 04/21/23 0609    Enterococcus faecalis Not Detected    Enterococcus faecium Not Detected    Listeria Monocytogenes Not Detected    Staphylococcus spp. Not Detected    Staphylococcus aureus Not Detected    Staphylococcus epidermidis Not Detected    Staphylococcus lugdunensis Not Detected    Streptococcus species Not Detected    Streptococcus agalactiae Not Detected    Streptococcus pneumoniae Not Detected    Streptococcus pyogenes Not Detected    Acinetobacter calcoaceticus/baumannii  complex Not Detected    Bacteroides fragilis Not Detected    Enterobacerales Not Detected    Enterobacter cloacae complex Not Detected    Escherichia Not Detected    Klebsiella aerogenes Not Detected    Klebsiella oxytoca Not Detected    Klebsiella pneumoniae group Not Detected    Proteus Not Detected    Salmonella sp Not Detected    Serratia marcescens Not Detected    Haemophilus influenzae Not Detected    Neisseria meningtidis Not Detected    Pseudomonas aeruginosa Not Detected    Stenotrophomonas maltophilia Not Detected    Candida albicans Not Detected    Candida auris Not Detected    Candida glabrata Not Detected    Candida krusei Not Detected    Candida parapsilosis Not Detected    Candida tropicalis Not Detected    Cryptococcus neoformans/gattii Not Detected    CTX-M (ESBL ) Not Detected    IMP (Carbapenem resistant) Not Detected    KPC resistance gene (Carbapenem resistant) Not Detected    mcr-1  Not Detected    mec A/C  Not Detected    mec A/C and MREJ (MRSA) gene Not Detected    NDM (Carbapenem resistant) Not Detected    OXA-48-like (Carbapenem resistant) Not Detected    van A/B (VRE gene) Not Detected    VIM (Carbapenem resistant) Not Detected       Microbiology Results (last 7 days)       Procedure Component Value Units Date/Time    Aerobic culture [[id number]]  (Abnormal)  (Susceptibility) Collected: 04/19/23 1130    Order Status: Completed Specimen: Wound from Sacrum Updated: 04/21/23 0747     Aerobic Bacterial Culture PROTEUS MIRABILIS  Moderate      Narrative:      Sacrum  (SWAB)    Gram stain [[id number]] Collected: 04/20/23 1901    Order Status: Completed Specimen: Wound from Sacrum Updated: 04/20/23 2037     Gram Stain Result Few WBC's      Few Gram positive cocci      Rare Gram negative rods    Narrative:      Sacral Wound    AFB Culture & Smear [[id number]] Collected: 04/20/23 1901    Order Status: Sent Specimen: Wound from Sacrum Updated: 04/20/23 1916    Fungus culture [[id number]]  Collected: 04/20/23 1901    Order Status: Sent Specimen: Wound from Sacrum Updated: 04/20/23 1915    Culture, Anaerobe [[id number]] Collected: 04/20/23 1901    Order Status: Sent Specimen: Wound from Sacrum Updated: 04/20/23 1915    Aerobic culture [[id number]] Collected: 04/20/23 1901    Order Status: Sent Specimen: Wound from Sacrum Updated: 04/20/23 1915    Culture, Anaerobic [[id number]] Collected: 04/19/23 1239    Order Status: Sent Specimen: Wound from Sacrum Updated: 04/19/23 1318    MRSA Screen by PCR [[id number]] Collected: 04/19/23 0940    Order Status: Completed Specimen: Nasopharyngeal Swab from Nasal Updated: 04/19/23 1153     MRSA SCREEN BY PCR Negative           Collected: 04/19/23 1130   Order Status: Completed Specimen: Wound from Sacrum Updated: 04/21/23 0747    Aerobic Bacterial Culture PROTEUS MIRABILIS   Moderate    Abnormal    Narrative:     Sacrum  (SWAB)   Susceptibility     Proteus mirabilis     CULTURE, AEROBIC  (SPECIFY SOURCE)     Amp/Sulbactam <=4/2 mcg/mL Sensitive     Ampicillin <=8 mcg/mL Sensitive     Cefazolin <=2 mcg/mL Sensitive     Cefepime <=2 mcg/mL Sensitive     Ceftriaxone <=1 mcg/mL Sensitive     Ciprofloxacin <=1 mcg/mL Sensitive     Ertapenem <=0.5 mcg/mL Sensitive     Gentamicin <=4 mcg/mL Sensitive     Levofloxacin <=2 mcg/mL Sensitive     Meropenem <=1 mcg/mL Sensitive     Piperacillin/Tazo <=16 mcg/mL Sensitive     Tobramycin <=4 mcg/mL Sensitive     Trimeth/Sulfa <=2/38 mcg/mL Sensitive              Imaging Reviewed:  CXR  CT head   X-ray R foot: No radiographic evidence of osteomyelitis.  LE Arterial US: No clinically significant stenosis, large vessel occlusion or aneurysm in the visualized arteries of the lower extremities.     Cardiology:       IMPRESSION & PLAN     Septic shock in the setting of unstageable foul-smelling sacrococcygeal ulcer s/p I&D by Surgery 4/20   at [facility], high  Blood cultures x 2 at [facility] 1/4 bottles GPR, pending ID   Bedside culture 4/19  Pansensitive Proteus mirabilis     2. B/l feet non healing ulcers, likely DTIs from being bed-bound    3. EMMY, cr 2.0, baseline 1.7, trending down    4. Prior supratherapeutic INR, 9.4, reversed 1.3 today     5. PMHx: HTN, DM, CAD s/p MI and cardiac stents, AFib, MTHFR mutation on Coumadin, HLD, gout, ongoing dementia      Recommendations:  Follow OR cultures   Repeat blood cultures   Can dc Vancomycin and Meropenem IV   Start Zosyn 4.5g IV q8h, 4h infusion, dose as per crcl for Proteus mirabilis, awaiting OR cultures  CRP and procal ordered with AM labs   Wound care to all affected areas, will likely need a wound vac  He would likely benefit from colostomy in the near future to prevent further infections of sacrococcygeal wound since he is non-ambulatory   Aspiration precautions     D/w patient, nursing, Dr Anna Long will follow cultures over the weekend    Medical Decision Making during this encounter was  [_] Low Complexity  [_] Moderate Complexity  [xx] High Complexity

## 2023-04-21 NOTE — PROGRESS NOTES
Wound Care Progress Note    Subjective:       Patient ID: Richard Bustos is a 77 y.o. male.    Chief Complaint: Shortness of Breath    HPI  Richard Bustos is a 77 y.o. male presenting with multiple open wounds on his arms, legs, sacrum and neck. Pt presented to the ED from [facility] for acute encephalopathy beyond his baseline dementia. On admission he was found to have multiple wounds of the BL arms, BLE, posterior neck and sacrum. Sacrum wound is necrotic and dry. Pt was unable lionel assist with his history due to his condition. Surgery debrided the sacral wound and it is now packed, holding on the wound vac  Review of Systems   Skin:  Positive for wound.        Multiple open wounds of the arms, legs and sacrum   All other systems reviewed and are negative.      Objective:        Physical Exam  Vitals and nursing note reviewed. Exam conducted with a chaperone present.   Constitutional:       General: He is not in acute distress.     Appearance: He is ill-appearing. He is not toxic-appearing or diaphoretic.   HENT:      Head:      Comments: Multiple bruises on the face and forehead  Skin:     General: Skin is warm and dry.      Coloration: Skin is not jaundiced or pale.      Findings: Bruising and lesion present. No erythema or rash.      Comments: Sacral stage 4 pressure ulcer, debrided in surgery, BL legs open wounds, arm wounds.    Neurological:      General: No focal deficit present.      Mental Status: He is alert and oriented to person, place, and time.   Psychiatric:         Mood and Affect: Mood normal.         Behavior: Behavior normal.       Vitals:    04/21/23 1203   BP: (!) 109/59   Pulse: 70   Resp: (!) 22   Temp: 98.1 °F (36.7 °C)       Assessment:           ICD-10-CM ICD-9-CM   1. Pressure injury of sacral region, stage 4  L89.154 707.03     707.24   2. Septic shock  A41.9 038.9    R65.21 785.52     995.92            Altered Skin Integrity 03/21/23 0412 Right anterior;lower;proximal Leg #5  Skin Tear (Active)   03/21/23 0412   Altered Skin Integrity Present on Admission - Did Patient arrive to the hospital with altered skin?: yes   Side: Right   Orientation: anterior;lower;proximal   Location: Leg   Wound Number: #5   Is this injury device related?:    Primary Wound Type: Skin tear   Description of Altered Skin Integrity:    Ankle-Brachial Index:    Pulses:    Removal Indication and Assessment:    Wound Outcome:    (Retired) Wound Length (cm):    (Retired) Wound Width (cm):    (Retired) Depth (cm):    Wound Description (Comments):    Removal Indications:    Description of Altered Skin Integrity Partial thickness tissue loss. Shallow open ulcer with a red or pink wound bed, without slough. Intact or Open/Ruptured Serum-filled blister. 04/20/23 0701   Dressing Appearance Dry;Clean;Intact 04/20/23 0701   Drainage Amount None 04/20/23 0701   Drainage Characteristics/Odor Serosanguineous 04/19/23 0701   Appearance Dressing in place, unable to visualize 04/20/23 0701   Dressing Reinforced 04/19/23 0101            Altered Skin Integrity 04/01/23 0145 Right anterior;lower Leg Non pressure chronic ulcer Partial thickness tissue loss. Shallow open ulcer with a red or pink wound bed, without slough. Intact or Open/Ruptured Serum-filled blister. (Active)   04/01/23 0145   Altered Skin Integrity Present on Admission - Did Patient arrive to the hospital with altered skin?: yes   Side: Right   Orientation: anterior;lower   Location: Leg   Wound Number:    Is this injury device related?:    Primary Wound Type: Non pressure   Description of Altered Skin Integrity: Partial thickness tissue loss. Shallow open ulcer with a red or pink wound bed, without slough. Intact or Open/Ruptured Serum-filled blister.   Ankle-Brachial Index:    Pulses:    Removal Indication and Assessment:    Wound Outcome:    (Retired) Wound Length (cm):    (Retired) Wound Width (cm):    (Retired) Depth (cm):    Wound Description (Comments):     Removal Indications:    Dressing Appearance Clean;Dry;Intact 04/20/23 0701   Drainage Amount None 04/19/23 1940   Appearance Dressing in place, unable to visualize 04/20/23 0701   Dressing Reinforced 04/19/23 0701            Altered Skin Integrity 04/01/23 0145 Right anterior Knee Skin Tear (Active)   04/01/23 0145   Altered Skin Integrity Present on Admission - Did Patient arrive to the hospital with altered skin?: yes   Side: Right   Orientation: anterior   Location: Knee   Wound Number:    Is this injury device related?:    Primary Wound Type: Skin tear   Description of Altered Skin Integrity:    Ankle-Brachial Index:    Pulses:    Removal Indication and Assessment:    Wound Outcome:    (Retired) Wound Length (cm):    (Retired) Wound Width (cm):    (Retired) Depth (cm):    Wound Description (Comments):    Removal Indications:    Description of Altered Skin Integrity Partial thickness tissue loss. Shallow open ulcer with a red or pink wound bed, without slough. Intact or Open/Ruptured Serum-filled blister. 04/20/23 0701   Dressing Appearance Open to air 04/19/23 1940   Drainage Amount None 04/19/23 1940   Appearance Pink 04/19/23 0701   Tissue loss description Partial thickness 04/20/23 0701   Dressing Reinforced 04/19/23 0701            Altered Skin Integrity 04/01/23 0146 Left Arm Skin Tear (Active)   04/01/23 0146   Altered Skin Integrity Present on Admission - Did Patient arrive to the hospital with altered skin?: yes   Side: Left   Orientation:    Location: Arm   Wound Number:    Is this injury device related?:    Primary Wound Type: Skin tear   Description of Altered Skin Integrity:    Ankle-Brachial Index:    Pulses:    Removal Indication and Assessment:    Wound Outcome:    (Retired) Wound Length (cm):    (Retired) Wound Width (cm):    (Retired) Depth (cm):    Wound Description (Comments):    Removal Indications:    Description of Altered Skin Integrity Partial thickness tissue loss. Shallow open ulcer  with a red or pink wound bed, without slough. Intact or Open/Ruptured Serum-filled blister. 04/20/23 0701   Dressing Appearance Clean;Dry;Intact 04/20/23 0701   Drainage Amount None 04/20/23 0701   Drainage Characteristics/Odor Serosanguineous 04/19/23 0701   Appearance Dressing in place, unable to visualize 04/20/23 0701   Tissue loss description Partial thickness 04/19/23 0701   Periwound Area Redness 04/19/23 0701   Wound Edges Irregular 04/19/23 0701   Care Cleansed with:;Wound cleanser 04/19/23 0701   Dressing Foam;Reinforced 04/19/23 0701            Altered Skin Integrity 04/01/23 0153 Left Hand Abrasion(s) (Active)   04/01/23 0153   Altered Skin Integrity Present on Admission - Did Patient arrive to the hospital with altered skin?: yes   Side: Left   Orientation:    Location: Hand   Wound Number:    Is this injury device related?:    Primary Wound Type: Abrasion(s)   Description of Altered Skin Integrity:    Ankle-Brachial Index:    Pulses:    Removal Indication and Assessment:    Wound Outcome:    (Retired) Wound Length (cm):    (Retired) Wound Width (cm):    (Retired) Depth (cm):    Wound Description (Comments):    Removal Indications:    Description of Altered Skin Integrity Partial thickness tissue loss. Shallow open ulcer with a red or pink wound bed, without slough. Intact or Open/Ruptured Serum-filled blister. 04/20/23 0701   Dressing Appearance Clean;Dry;Intact 04/20/23 0701   Drainage Amount None 04/20/23 0701   Appearance Dressing in place, unable to visualize 04/20/23 0701   Tissue loss description Not applicable 04/20/23 0701   Dressing Reinforced;Gauze 04/19/23 0701            Altered Skin Integrity 04/01/23 0154 Right Nose Abrasion(s) (Active)   04/01/23 0154   Altered Skin Integrity Present on Admission - Did Patient arrive to the hospital with altered skin?: yes   Side: Right   Orientation:    Location: Nose   Wound Number:    Is this injury device related?:    Primary Wound Type: Abrasion(s)    Description of Altered Skin Integrity:    Ankle-Brachial Index:    Pulses:    Removal Indication and Assessment:    Wound Outcome:    (Retired) Wound Length (cm):    (Retired) Wound Width (cm):    (Retired) Depth (cm):    Wound Description (Comments):    Removal Indications:    Description of Altered Skin Integrity Partial thickness tissue loss. Shallow open ulcer with a red or pink wound bed, without slough. Intact or Open/Ruptured Serum-filled blister. 04/20/23 0701   Dressing Appearance Open to air 04/20/23 0701   Drainage Amount None 04/19/23 1940   Appearance Fibrin 04/19/23 0701   Tissue loss description Not applicable 04/19/23 0701   Dressing Reinforced 04/19/23 0701            Altered Skin Integrity 03/31/23 Sacral spine Ulceration Full thickness tissue loss. Base is covered by slough and/or eschar in the wound bed (Active)   03/31/23    Altered Skin Integrity Present on Admission - Did Patient arrive to the hospital with altered skin?: yes   Side:    Orientation:    Location: Sacral spine   Wound Number:    Is this injury device related?:    Primary Wound Type: Ulceration   Description of Altered Skin Integrity: Full thickness tissue loss. Base is covered by slough and/or eschar in the wound bed   Ankle-Brachial Index:    Pulses:    Removal Indication and Assessment:    Wound Outcome:    (Retired) Wound Length (cm):    (Retired) Wound Width (cm):    (Retired) Depth (cm):    Wound Description (Comments):    Removal Indications:    Dressing Appearance Clean;Dry;Intact 04/20/23 0701   Drainage Amount None 04/20/23 0701   Appearance Dressing in place, unable to visualize 04/20/23 0701   Dressing Reinforced;Foam 04/19/23 0701            Altered Skin Integrity 03/21/23 Right anterior;medial Toe, first Non pressure chronic ulcer Partial thickness tissue loss. Shallow open ulcer with a red or pink wound bed, without slough. Intact or Open/Ruptured Serum-filled blister. (Active)   03/21/23    Altered Skin  Integrity Present on Admission - Did Patient arrive to the hospital with altered skin?: yes   Side: Right   Orientation: anterior;medial   Location: Toe, first   Wound Number:    Is this injury device related?: No   Primary Wound Type: Non pressure   Description of Altered Skin Integrity: Partial thickness tissue loss. Shallow open ulcer with a red or pink wound bed, without slough. Intact or Open/Ruptured Serum-filled blister.   Ankle-Brachial Index:    Pulses:    Removal Indication and Assessment:    Wound Outcome:    (Retired) Wound Length (cm):    (Retired) Wound Width (cm):    (Retired) Depth (cm):    Wound Description (Comments):    Removal Indications:    Description of Altered Skin Integrity Full thickness tissue loss. Subcutaneous fat may be visible but bone, tendon or muscle are not exposed 04/20/23 0701   Dressing Appearance Clean;Dry;Intact 04/20/23 0701   Drainage Amount None 04/20/23 0701   Appearance Dressing in place, unable to visualize 04/20/23 0701   Tissue loss description Full thickness 04/20/23 0701   Dressing Reinforced;Gauze 04/19/23 0101   Off Loading Football dressing 04/20/23 0701            Altered Skin Integrity 04/04/23 Right anterior;dorsal Foot Skin Tear Partial thickness tissue loss. Shallow open ulcer with a red or pink wound bed, without slough. Intact or Open/Ruptured Serum-filled blister. (Active)   04/04/23    Altered Skin Integrity Present on Admission - Did Patient arrive to the hospital with altered skin?: yes   Side: Right   Orientation: anterior;dorsal   Location: Foot   Wound Number:    Is this injury device related?:    Primary Wound Type: Skin tear   Description of Altered Skin Integrity: Partial thickness tissue loss. Shallow open ulcer with a red or pink wound bed, without slough. Intact or Open/Ruptured Serum-filled blister.   Ankle-Brachial Index:    Pulses:    Removal Indication and Assessment:    Wound Outcome:    (Retired) Wound Length (cm):    (Retired) Wound  Width (cm):    (Retired) Depth (cm):    Wound Description (Comments):    Removal Indications:    Description of Altered Skin Integrity Partial thickness tissue loss. Shallow open ulcer with a red or pink wound bed, without slough. Intact or Open/Ruptured Serum-filled blister. 04/20/23 0701   Dressing Appearance Dry;Clean;Intact 04/20/23 0701   Drainage Amount None 04/20/23 0701   Drainage Characteristics/Odor Serosanguineous 04/19/23 0701   Appearance Dressing in place, unable to visualize 04/20/23 0701   Tissue loss description Partial thickness 04/19/23 0701   Dressing Reinforced 04/19/23 0101            Altered Skin Integrity 04/18/23 1600 Left anterior;lower;medial Leg Non pressure chronic ulcer Partial thickness tissue loss. Shallow open ulcer with a red or pink wound bed, without slough. Intact or Open/Ruptured Serum-filled blister. (Active)   04/18/23 1600   Altered Skin Integrity Present on Admission - Did Patient arrive to the hospital with altered skin?: yes   Side: Left   Orientation: anterior;lower;medial   Location: Leg   Wound Number:    Is this injury device related?:    Primary Wound Type: Non pressure   Description of Altered Skin Integrity: Partial thickness tissue loss. Shallow open ulcer with a red or pink wound bed, without slough. Intact or Open/Ruptured Serum-filled blister.   Ankle-Brachial Index:    Pulses:    Removal Indication and Assessment:    Wound Outcome:    (Retired) Wound Length (cm):    (Retired) Wound Width (cm):    (Retired) Depth (cm):    Wound Description (Comments):    Removal Indications:    Dressing Appearance Dry 04/20/23 0701   Drainage Amount None 04/20/23 0701   Appearance Dressing in place, unable to visualize 04/20/23 0701   Dressing Reinforced 04/20/23 0701            Altered Skin Integrity 04/18/23 1600 Left anterior;lateral Foot Ulceration Full thickness tissue loss. Base is covered by slough and/or eschar in the wound bed (Active)   04/18/23 1600   Altered Skin  Integrity Present on Admission - Did Patient arrive to the hospital with altered skin?: yes   Side: Left   Orientation: anterior;lateral   Location: Foot   Wound Number:    Is this injury device related?:    Primary Wound Type: Ulceration   Description of Altered Skin Integrity: Full thickness tissue loss. Base is covered by slough and/or eschar in the wound bed   Ankle-Brachial Index:    Pulses:    Removal Indication and Assessment:    Wound Outcome:    (Retired) Wound Length (cm):    (Retired) Wound Width (cm):    (Retired) Depth (cm):    Wound Description (Comments):    Removal Indications:    Description of Altered Skin Integrity Full thickness tissue loss. Subcutaneous fat may be visible but bone, tendon or muscle are not exposed 04/20/23 0701   Dressing Appearance Clean;Dry;Intact 04/20/23 0701   Drainage Amount None 04/20/23 0701   Drainage Characteristics/Odor Serosanguineous 04/19/23 0701   Appearance Dressing in place, unable to visualize 04/20/23 0701   Tissue loss description Full thickness 04/19/23 0701   Black (%), Wound Tissue Color 0 % 04/19/23 0701   Red (%), Wound Tissue Color 0 % 04/19/23 0701   Yellow (%), Wound Tissue Color 100 % 04/19/23 0701   Periwound Area Pale white 04/19/23 0701   Dressing Foam;Reinforced 04/19/23 0701            Altered Skin Integrity 04/18/23 1600 Right distal Toe, first Ulceration Full thickness tissue loss. Base is covered by slough and/or eschar in the wound bed (Active)   04/18/23 1600   Altered Skin Integrity Present on Admission - Did Patient arrive to the hospital with altered skin?: yes   Side: Right   Orientation: distal   Location: Toe, first   Wound Number:    Is this injury device related?:    Primary Wound Type: Ulceration   Description of Altered Skin Integrity: Full thickness tissue loss. Base is covered by slough and/or eschar in the wound bed   Ankle-Brachial Index:    Pulses:    Removal Indication and Assessment:    Wound Outcome:    (Retired) Wound  Length (cm):    (Retired) Wound Width (cm):    (Retired) Depth (cm):    Wound Description (Comments):    Removal Indications:    Description of Altered Skin Integrity Full thickness tissue loss. Subcutaneous fat may be visible but bone, tendon or muscle are not exposed 04/20/23 0701   Dressing Appearance Clean;Dry;Intact 04/20/23 0701   Drainage Amount None 04/19/23 1940   Appearance Dressing in place, unable to visualize 04/20/23 0701   Tissue loss description Full thickness 04/20/23 0701   Dressing Reinforced;Gauze 04/19/23 0701            Altered Skin Integrity 04/18/23 1600 Right posterior Calf Ulceration Full thickness tissue loss. Base is covered by slough and/or eschar in the wound bed (Active)   04/18/23 1600   Altered Skin Integrity Present on Admission - Did Patient arrive to the hospital with altered skin?: yes   Side: Right   Orientation: posterior   Location: Calf   Wound Number:    Is this injury device related?:    Primary Wound Type: Ulceration   Description of Altered Skin Integrity: Full thickness tissue loss. Base is covered by slough and/or eschar in the wound bed   Ankle-Brachial Index:    Pulses:    Removal Indication and Assessment:    Wound Outcome:    (Retired) Wound Length (cm):    (Retired) Wound Width (cm):    (Retired) Depth (cm):    Wound Description (Comments):    Removal Indications:    Description of Altered Skin Integrity Full thickness tissue loss. Subcutaneous fat may be visible but bone, tendon or muscle are not exposed 04/20/23 0701   Dressing Appearance Clean;Dry;Intact 04/20/23 0701   Drainage Amount None 04/20/23 0701   Appearance Dressing in place, unable to visualize 04/20/23 0701   Dressing Reinforced 04/19/23 0701            Altered Skin Integrity 04/18/23 1600 Right posterior Hand Abrasion(s) Partial thickness tissue loss. Shallow open ulcer with a red or pink wound bed, without slough. Intact or Open/Ruptured Serum-filled blister. (Active)   04/18/23 1600   Altered Skin  Integrity Present on Admission - Did Patient arrive to the hospital with altered skin?: yes   Side: Right   Orientation: posterior   Location: Hand   Wound Number:    Is this injury device related?:    Primary Wound Type: Abrasion(s)   Description of Altered Skin Integrity: Partial thickness tissue loss. Shallow open ulcer with a red or pink wound bed, without slough. Intact or Open/Ruptured Serum-filled blister.   Ankle-Brachial Index:    Pulses:    Removal Indication and Assessment:    Wound Outcome:    (Retired) Wound Length (cm):    (Retired) Wound Width (cm):    (Retired) Depth (cm):    Wound Description (Comments):    Removal Indications:    Description of Altered Skin Integrity Partial thickness tissue loss. Shallow open ulcer with a red or pink wound bed, without slough. Intact or Open/Ruptured Serum-filled blister. 04/20/23 0701   Dressing Appearance Clean;Dry;Intact 04/20/23 0701   Drainage Amount None 04/19/23 1940   Appearance Dressing in place, unable to visualize 04/20/23 0701   Tissue loss description Partial thickness 04/20/23 0701   Care Cleansed with:;Wound cleanser 04/19/23 0701   Dressing Reinforced 04/19/23 0701            Altered Skin Integrity 04/19/23 0101 posterior Scrotum Intact skin with non-blanchable redness of localized area (Active)   04/19/23 0101   Altered Skin Integrity Present on Admission - Did Patient arrive to the hospital with altered skin?: yes   Side:    Orientation: posterior   Location: Scrotum   Wound Number:    Is this injury device related?:    Primary Wound Type:    Description of Altered Skin Integrity: Intact skin with non-blanchable redness of localized area   Ankle-Brachial Index:    Pulses:    Removal Indication and Assessment:    Wound Outcome:    (Retired) Wound Length (cm):    (Retired) Wound Width (cm):    (Retired) Depth (cm):    Wound Description (Comments):    Removal Indications:    Description of Altered Skin Integrity Intact skin with non-blanchable  redness of localized area 04/20/23 0701   Dressing Appearance Open to air 04/20/23 0701   Drainage Amount None 04/20/23 0701   Appearance Red 04/20/23 0701   Care Cleansed with:;Soap and water;Applied:;Skin Barrier 04/19/23 0101            Incision/Site 04/20/23 1902 Back (Active)   04/20/23 1902   Present Prior to Hospital Arrival?:    Side:    Location: Back   Orientation:    Incision Type:    Closure Method:    Additional Comments:    Removal Indication and Assessment:    Wound Outcome:    Removal Indications:    Incision WDL ex 04/21/23 0930   Dressing Appearance Dried drainage 04/21/23 0930   Drainage Amount Small 04/21/23 0930   Drainage Characteristics/Odor Serosanguineous;Bleeding controlled 04/21/23 0930   Periwound Area Denuded;Moist 04/21/23 0930   Wound Edges Defined 04/21/23 0930   Care Cleansed with:;Povidone iodine 04/21/23 0930   Dressing Changed;Gauze;Foam 04/21/23 0930   Packing packed with;other (see comment) 04/21/23 0930     X-Ray Cervical Spine 2 or 3 Views  Order: [id number]  Status: Final result     Visible to patient: Yes (seen)     Next appt: 04/24/2023 at 10:45 AM in Dermatology (Alana Brown MD)     0 Result Notes  Details    Reading Physician Reading Date Result Priority   Erick Tenorio MD  [phone] 4/19/2023      Narrative & Impression  Reason: open surgical wound     FINDINGS:     AP and lateral views of cervical spine. Limited lateral views due to overlying shoulder shadows. C3-C7 bilateral spinal fixation hardware noted and appears intact. No definite fracture or destructive osseous lesion observed. There is intervertebral disc height loss of C2-3, C3-4 and C4-5 with limited evaluation. Visualized paravertebral soft tissues are unremarkable.        IMPRESSION:     1.  C3-C7 spinal fixation hardware appears intact.  2.  Intervertebral disc height loss at C2-3, C3-4 and C4-5.  3.  Limited evaluation of the cervical spine on lateral view.     Electronically signed by:   Erick Tenorio DO  4/19/2023 4:00 PM CDT Workstation: [id number]           Specimen Collected: 04/19/23 15:41               X-Ray Lumbar Spine 2 Or 3 Views  Order: [id number]  Status: Final result     Visible to patient: Yes (seen)     Next appt: 04/24/2023 at 10:45 AM in Dermatology (Alana Brown MD)     0 Result Notes  Details    Reading Physician Reading Date Result Priority   Erick Tenorio MD  [phone] 4/19/2023      Narrative & Impression  Reason: open wound     FINDINGS:     AP and limited lateral views of lumbar  spine show normal alignment. No fracture or destructive osseous lesion. There is diffuse intervertebral disc height loss throughout the lumbar spine with associated vertebral body marginal osteophytes. IVC filter is noted.     Paraspinal soft tissues are grossly normal.     IMPRESSION:     Diffuse discogenic degenerative changes of the lumbar spine.     Electronically signed by:  Erick Tenorio DO  4/19/2023 3:55 PM CDT Workstation: [id number]           Specimen Collected: 04/19/23 15:12             Plan:               Follow-up Information       Glenroy Eddy DO Follow up in 1 week(s).    Specialty: Wound Care  Why: For wound re-check  Contact information:  [address] [facility] [address]  [facility] [id number] [phone]                           Assessment and Recommendations         Diagnoses:    1. Unstagable sacral pressure ulcer with DTI  2 Left arm skin tear  3. Left lateral foot un-stagable pressure ulcer  4. Right great toe un-stagable pressure ulcer/DM ulcer  5. Right foot open wound with eschar  6. Right leg open wound  7. Multiple open wounds of the left leg  8. Posterior neck un-stagable pressure ulcer  9. BLE multiple open wounds     Plan:  Aerobic culture of the sacrum pending  Anaerobic culture of the sacrum pending  Lumbar xray  Cervical xray  Pack Sacral wound per surgery, will apply a wound vac early next week  Open wounds of the legs xeroform + foam dressing  Arm wound  medihoney daily  8. FU at the wound clinic on DC    Total time spent in chart review, consulting with the family, charting, direct patient care was 55 minutes

## 2023-04-22 PROBLEM — L89.154 PRESSURE INJURY OF SACRAL REGION, STAGE 4: Status: ACTIVE | Noted: 2023-04-19

## 2023-04-22 PROBLEM — A49.8 PROTEUS INFECTION: Status: ACTIVE | Noted: 2023-04-22

## 2023-04-22 LAB
ALBUMIN SERPL BCP-MCNC: 1.7 G/DL (ref 3.5–5.2)
ALP SERPL-CCNC: 395 U/L (ref 55–135)
ALT SERPL W/O P-5'-P-CCNC: 209 U/L (ref 10–44)
ANION GAP SERPL CALC-SCNC: 5 MMOL/L (ref 8–16)
AST SERPL-CCNC: 299 U/L (ref 10–40)
BASOPHILS # BLD AUTO: 0.02 K/UL (ref 0–0.2)
BASOPHILS NFR BLD: 0.2 % (ref 0–1.9)
BILIRUB SERPL-MCNC: 1 MG/DL (ref 0.1–1)
BUN SERPL-MCNC: 44 MG/DL (ref 8–23)
CALCIUM SERPL-MCNC: 8.7 MG/DL (ref 8.7–10.5)
CHLORIDE SERPL-SCNC: 118 MMOL/L (ref 95–110)
CO2 SERPL-SCNC: 20 MMOL/L (ref 23–29)
CREAT SERPL-MCNC: 1.5 MG/DL (ref 0.5–1.4)
CRP SERPL-MCNC: 12.56 MG/DL
DIFFERENTIAL METHOD: ABNORMAL
EOSINOPHIL # BLD AUTO: 0.3 K/UL (ref 0–0.5)
EOSINOPHIL NFR BLD: 2.2 % (ref 0–8)
ERYTHROCYTE [DISTWIDTH] IN BLOOD BY AUTOMATED COUNT: 14.6 % (ref 11.5–14.5)
EST. GFR  (NO RACE VARIABLE): 48.2 ML/MIN/1.73 M^2
GLUCOSE SERPL-MCNC: 103 MG/DL (ref 70–110)
GLUCOSE SERPL-MCNC: 111 MG/DL (ref 70–110)
GLUCOSE SERPL-MCNC: 97 MG/DL (ref 70–110)
GLUCOSE SERPL-MCNC: 98 MG/DL (ref 70–110)
HCT VFR BLD AUTO: 23.1 % (ref 40–54)
HGB BLD-MCNC: 7.6 G/DL (ref 14–18)
IMM GRANULOCYTES # BLD AUTO: 0.13 K/UL (ref 0–0.04)
IMM GRANULOCYTES NFR BLD AUTO: 1.1 % (ref 0–0.5)
LYMPHOCYTES # BLD AUTO: 0.7 K/UL (ref 1–4.8)
LYMPHOCYTES NFR BLD: 5.6 % (ref 18–48)
MAGNESIUM SERPL-MCNC: 2 MG/DL (ref 1.6–2.6)
MCH RBC QN AUTO: 32.3 PG (ref 27–31)
MCHC RBC AUTO-ENTMCNC: 32.9 G/DL (ref 32–36)
MCV RBC AUTO: 98 FL (ref 82–98)
MONOCYTES # BLD AUTO: 0.3 K/UL (ref 0.3–1)
MONOCYTES NFR BLD: 2.4 % (ref 4–15)
NEUTROPHILS # BLD AUTO: 10.7 K/UL (ref 1.8–7.7)
NEUTROPHILS NFR BLD: 88.5 % (ref 38–73)
NRBC BLD-RTO: 0 /100 WBC
PHOSPHATE SERPL-MCNC: 3.1 MG/DL (ref 2.7–4.5)
PLATELET # BLD AUTO: 279 K/UL (ref 150–450)
PMV BLD AUTO: 10.4 FL (ref 9.2–12.9)
POTASSIUM SERPL-SCNC: 4.4 MMOL/L (ref 3.5–5.1)
PROCALCITONIN SERPL IA-MCNC: 1.39 NG/ML (ref 0–0.5)
PROT SERPL-MCNC: 5 G/DL (ref 6–8.4)
RBC # BLD AUTO: 2.35 M/UL (ref 4.6–6.2)
SODIUM SERPL-SCNC: 143 MMOL/L (ref 136–145)
WBC # BLD AUTO: 12.08 K/UL (ref 3.9–12.7)

## 2023-04-22 PROCEDURE — 63600175 PHARM REV CODE 636 W HCPCS: Performed by: STUDENT IN AN ORGANIZED HEALTH CARE EDUCATION/TRAINING PROGRAM

## 2023-04-22 PROCEDURE — 25000003 PHARM REV CODE 250: Performed by: STUDENT IN AN ORGANIZED HEALTH CARE EDUCATION/TRAINING PROGRAM

## 2023-04-22 PROCEDURE — 99232 SBSQ HOSP IP/OBS MODERATE 35: CPT | Mod: ,,, | Performed by: INTERNAL MEDICINE

## 2023-04-22 PROCEDURE — 84100 ASSAY OF PHOSPHORUS: CPT | Performed by: STUDENT IN AN ORGANIZED HEALTH CARE EDUCATION/TRAINING PROGRAM

## 2023-04-22 PROCEDURE — 83735 ASSAY OF MAGNESIUM: CPT | Performed by: STUDENT IN AN ORGANIZED HEALTH CARE EDUCATION/TRAINING PROGRAM

## 2023-04-22 PROCEDURE — 12000002 HC ACUTE/MED SURGE SEMI-PRIVATE ROOM

## 2023-04-22 PROCEDURE — C1751 CATH, INF, PER/CENT/MIDLINE: HCPCS

## 2023-04-22 PROCEDURE — 84145 PROCALCITONIN (PCT): CPT | Performed by: STUDENT IN AN ORGANIZED HEALTH CARE EDUCATION/TRAINING PROGRAM

## 2023-04-22 PROCEDURE — 99232 PR SUBSEQUENT HOSPITAL CARE,LEVL II: ICD-10-PCS | Mod: ,,, | Performed by: INTERNAL MEDICINE

## 2023-04-22 PROCEDURE — 85025 COMPLETE CBC W/AUTO DIFF WBC: CPT | Performed by: STUDENT IN AN ORGANIZED HEALTH CARE EDUCATION/TRAINING PROGRAM

## 2023-04-22 PROCEDURE — 94799 UNLISTED PULMONARY SVC/PX: CPT

## 2023-04-22 PROCEDURE — 36410 VNPNXR 3YR/> PHY/QHP DX/THER: CPT

## 2023-04-22 PROCEDURE — 80053 COMPREHEN METABOLIC PANEL: CPT | Performed by: STUDENT IN AN ORGANIZED HEALTH CARE EDUCATION/TRAINING PROGRAM

## 2023-04-22 PROCEDURE — 86140 C-REACTIVE PROTEIN: CPT | Performed by: STUDENT IN AN ORGANIZED HEALTH CARE EDUCATION/TRAINING PROGRAM

## 2023-04-22 PROCEDURE — 94761 N-INVAS EAR/PLS OXIMETRY MLT: CPT

## 2023-04-22 PROCEDURE — 99900035 HC TECH TIME PER 15 MIN (STAT)

## 2023-04-22 RX ORDER — ENOXAPARIN SODIUM 100 MG/ML
1 INJECTION SUBCUTANEOUS
Status: DISCONTINUED | OUTPATIENT
Start: 2023-04-22 | End: 2023-04-24

## 2023-04-22 RX ADMIN — PIPERACILLIN AND TAZOBACTAM 4.5 G: 4; .5 INJECTION, POWDER, LYOPHILIZED, FOR SOLUTION INTRAVENOUS at 08:04

## 2023-04-22 RX ADMIN — THERA TABS 1 TABLET: TAB at 11:04

## 2023-04-22 RX ADMIN — MIDODRINE HYDROCHLORIDE 5 MG: 2.5 TABLET ORAL at 05:04

## 2023-04-22 RX ADMIN — ENOXAPARIN SODIUM 100 MG: 100 INJECTION SUBCUTANEOUS at 11:04

## 2023-04-22 RX ADMIN — FERROUS SULFATE TAB 325 MG (65 MG ELEMENTAL FE) 1 EACH: 325 (65 FE) TAB at 11:04

## 2023-04-22 RX ADMIN — MIDODRINE HYDROCHLORIDE 5 MG: 2.5 TABLET ORAL at 04:04

## 2023-04-22 RX ADMIN — SODIUM CHLORIDE: 0.9 INJECTION, SOLUTION INTRAVENOUS at 03:04

## 2023-04-22 RX ADMIN — MIDODRINE HYDROCHLORIDE 5 MG: 2.5 TABLET ORAL at 11:04

## 2023-04-22 RX ADMIN — PIPERACILLIN AND TAZOBACTAM 4.5 G: 4; .5 INJECTION, POWDER, LYOPHILIZED, FOR SOLUTION INTRAVENOUS at 05:04

## 2023-04-22 RX ADMIN — PIPERACILLIN AND TAZOBACTAM 4.5 G: 4; .5 INJECTION, POWDER, LYOPHILIZED, FOR SOLUTION INTRAVENOUS at 01:04

## 2023-04-22 RX ADMIN — CHLORHEXIDINE GLUCONATE 15 ML: 1.2 RINSE ORAL at 11:04

## 2023-04-22 RX ADMIN — AMIODARONE HYDROCHLORIDE 200 MG: 200 TABLET ORAL at 08:04

## 2023-04-22 RX ADMIN — SODIUM CHLORIDE: 0.9 INJECTION, SOLUTION INTRAVENOUS at 05:04

## 2023-04-22 RX ADMIN — ASPIRIN 81 MG: 81 TABLET, COATED ORAL at 11:04

## 2023-04-22 RX ADMIN — FAMOTIDINE 20 MG: 20 TABLET ORAL at 11:04

## 2023-04-22 RX ADMIN — CALCITRIOL CAPSULES 0.25 MCG 0.5 MCG: 0.25 CAPSULE ORAL at 11:04

## 2023-04-22 RX ADMIN — ALLOPURINOL 100 MG: 100 TABLET ORAL at 08:04

## 2023-04-22 RX ADMIN — ATORVASTATIN CALCIUM 80 MG: 40 TABLET, FILM COATED ORAL at 08:04

## 2023-04-22 RX ADMIN — AMIODARONE HYDROCHLORIDE 200 MG: 200 TABLET ORAL at 11:04

## 2023-04-22 RX ADMIN — CHLORHEXIDINE GLUCONATE 15 ML: 1.2 RINSE ORAL at 08:04

## 2023-04-22 NOTE — PLAN OF CARE
Problem: Adult Inpatient Plan of Care  Goal: Plan of Care Review  Outcome: Ongoing, Progressing  Goal: Patient-Specific Goal (Individualized)  Outcome: Ongoing, Progressing  Goal: Absence of Hospital-Acquired Illness or Injury  Outcome: Ongoing, Progressing  Goal: Optimal Comfort and Wellbeing  Outcome: Ongoing, Progressing  Goal: Readiness for Transition of Care  Outcome: Ongoing, Progressing     Problem: Fluid and Electrolyte Imbalance (Acute Kidney Injury/Impairment)  Goal: Fluid and Electrolyte Balance  Outcome: Ongoing, Progressing     Problem: Oral Intake Inadequate (Acute Kidney Injury/Impairment)  Goal: Optimal Nutrition Intake  Outcome: Ongoing, Progressing     Problem: Renal Function Impairment (Acute Kidney Injury/Impairment)  Goal: Effective Renal Function  Outcome: Ongoing, Progressing     Problem: Adjustment to Illness (Sepsis/Septic Shock)  Goal: Optimal Coping  Outcome: Ongoing, Progressing     Problem: Bleeding (Sepsis/Septic Shock)  Goal: Absence of Bleeding  Outcome: Ongoing, Progressing     Problem: Infection Progression (Sepsis/Septic Shock)  Goal: Absence of Infection Signs and Symptoms  Outcome: Ongoing, Progressing     Problem: Nutrition Impaired (Sepsis/Septic Shock)  Goal: Optimal Nutrition Intake  Outcome: Ongoing, Progressing     Problem: Infection  Goal: Absence of Infection Signs and Symptoms  Outcome: Ongoing, Progressing     Problem: Impaired Wound Healing  Goal: Optimal Wound Healing  Outcome: Ongoing, Progressing     Problem: Fall Injury Risk  Goal: Absence of Fall and Fall-Related Injury  Outcome: Ongoing, Progressing     Problem: Skin Injury Risk Increased  Goal: Skin Health and Integrity  Outcome: Ongoing, Progressing     Problem: Malnutrition  Goal: Improved Nutritional Intake  Outcome: Ongoing, Progressing

## 2023-04-22 NOTE — PROGRESS NOTES
Critical access hospital Medicine  Progress Note    Patient name: Richard Bustos  MRN: [id number]  Admit Date: 4/19/2023   LOS: 3 days     SUBJECTIVE:     Principal problem: Septic shock  Chief Complaint   Patient presents with    Shortness of Breath       Interval History:  No complaints today. He is doing well with wound vac to his sacral wound. Continuing IV abx. Cultures pending. Off pressors. Move to floor      Scheduled Meds:   allopurinoL  100 mg Oral QHS    amiodarone  200 mg Oral BID    aspirin  81 mg Oral Daily    atorvastatin  80 mg Oral Daily    calcitRIOL  0.5 mcg Oral Daily    chlorhexidine  15 mL Mouth/Throat BID    collagenase   Topical (Top) Daily    famotidine  20 mg Oral Daily    ferrous sulfate  1 tablet Oral Daily    midodrine  5 mg Oral TID    multivitamin  1 tablet Oral Daily    piperacillin-tazobactam (ZOSYN) IVPB  4.5 g Intravenous Q8H     Continuous Infusions:   sodium chloride 0.9% 125 mL/hr at 04/22/23 0600    NORepinephrine bitartrate-D5W Stopped (04/21/23 1827)    NORepinephrine bitartrate-D5W Stopped (04/22/23 0015)     PRN Meds:sodium chloride, acetaminophen, calcium gluconate IVPB, calcium gluconate IVPB, calcium gluconate IVPB, dextrose 50%, dextrose 50%, glucagon (human recombinant), glucose, glucose, insulin aspart U-100, LIDOcaine HCl 2%, magnesium sulfate IVPB, magnesium sulfate IVPB, melatonin, ondansetron, polyethylene glycol, potassium chloride **AND** potassium chloride **AND** potassium chloride, sodium phosphate IVPB, sodium phosphate IVPB, sodium phosphate IVPB    Review of patient's allergies indicates:   Allergen Reactions    Donepezil Other (See Comments)     AMS    Bactroban [mupirocin calcium] Blisters     Causes Blisters    Shellfish containing products Other (See Comments)     Other reaction(s): Gout  OYSTERS       Review of Systems: As per interval history    OBJECTIVE:     Vital Signs (Most Recent)  Temp: 98.1 °F (36.7 °C) (04/22/23 0315)  Pulse: 66  (04/22/23 0803)  Resp: (!) 94 (04/22/23 0803)  BP: 103/63 (04/22/23 0803)  SpO2: 97 % (04/22/23 0803)    Vital Signs Range (Last 24H):  Temp:  [97.7 °F (36.5 °C)-98.1 °F (36.7 °C)]   Pulse:  [47-99]   Resp:  [15-94]   BP: ()/(50-72)   SpO2:  [90 %-100 %]   Arterial Line BP: (103-155)/(34-68)     I & O (Last 24H):  Intake/Output Summary (Last 24 hours) at 4/22/2023 1026  Last data filed at 4/22/2023 0600  Gross per 24 hour   Intake 5482.61 ml   Output 1290 ml   Net 4192.61 ml       Physical Exam:  General: Patient resting comfortably in no acute distress. Appears as stated age. Calm  Eyes: No conjunctival injection. No scleral icterus.  ENT: Hearing grossly intact. No discharge from ears. No nasal discharge.   Neck: Supple, trachea midline. No JVD  CVS: RRR. No LE edema BL  Lungs: CTA BL, no wheezing or crackles. Good breath sounds. No accessory muscle use. No acute respiratory distress  Abdomen:  Soft, nontender and nondistended.  No organomegaly  Neuro: AOx3. Moves all extremities. Follows commands. Responds appropriately   Skin:  sacral decub, bilateral feet with skin decay    Laboratory:  I have reviewed all pertinent lab results within the past 24 hours.    Diagnostic Results:       ASSESSMENT/PLAN:     Pt is a 78 y/o with the below medical problems, admitted with septic shock due to infected sacral decubitus ulcer    Active Hospital Problems    Diagnosis  POA    *Septic shock [A41.9, R65.21]  Yes    Pressure injury of sacral region, unstageable [L89.150]  Yes    Supratherapeutic INR [R79.1]  Yes    Anemia [D64.9]  Yes    Multiple skin tears [T14.8XXA]  Yes    Closed nondisplaced fracture of sixth cervical vertebra [S12.501A]  Yes    Dementia without behavioral disturbance [F03.90]  Yes    EMMY (acute kidney injury) [N17.9]  Yes     Chronic    Chronic systolic congestive heart failure [I50.22]  Yes    Peripheral vascular disease [I73.9]  Yes    Paroxysmal atrial fibrillation [I48.0]  Yes     EKG  stable  Stable on Coreg, no missed doses of anticoagulation. Continue anticoagulation as described below        BPH with obstruction/lower urinary tract symptoms [N40.1, N13.8]  Yes    Diabetic neuropathy [E11.40]  Yes    OA (osteoarthritis). post bilateral THR, 2001 [M19.90]  Yes    Long term (current) use of anticoagulants [Z79.01]  Not Applicable     Risk vs benefit of anticoagulation medication discussed at length with patient and daughter.  With his history of MTHFR, a fib and hypercoagulability the benefit preventing of thrombus formation/stent reocclusion is difficult to balance with risk of bleeding.  That said, we will continue anticoagulation and reassess if further episodes of bleeding occur.   Monitor.        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         Plan:   - Continue zosyn per ID recs  - Hgb is improving  - renal function improving  - start lovenox for AC  - IVF resuscitation > decrease fluids  - started midodrine  - follow culture results  - will follow up with Dr Martinez regarding possible need for colostomy  - appreciate wound care  - diet  - appreciate surgery recommendations  - appreciate ID recommendations  - continue iron supplementation  - amiodarone for afib.       VTE Risk Mitigation (From admission, onward)           Ordered     Place KURTIS hose  Until  discontinued         04/19/23 0157     IP VTE HIGH RISK PATIENT  Once         04/19/23 0157                      Department Hospital Medicine  [facility]  Brennen Castillo MD  Date of service: 04/22/2023

## 2023-04-22 NOTE — PROGRESS NOTES
Progress Note  Infectious Disease    Reason for Consult:  Septic shock     HPI: Richard Bustos is a 77 y.o. male obese, BMI 33/6 Kg/m2, with past medical history of HTN, DM, CAD s/p MI and cardiac stents, AFib, MTHFR mutation on Coumadin, HLD, gout, ongoing dementia and prior fall on 3/17/23 which resulted in head trauma, and cervical fracture with cord edema status post cervical spine fusion on 03/24/2023 by Neurosurgery.  Patient was discharged to [facility] nursing [facility] [place] to continue recovery.  Unfortunately, since he has been bed-bound since his fall, he has developed unstageable sacrococcygeal wound.    Daughters at bedside providing most of the history, patient was found altered and hypotensive at facility yesterday, sent to [facility] ER for further workup.    Patient does not remember what happened yesterday, he is aware he is at the hospital in ICU, denies any fever or chills, no headache, no nausea or vomiting, no cough, no shortness of breath, no abdominal pain, no dysuria or increased urinary frequency, no changes in the color of the urine, reports having diarrhea a few days ago, had a tiny bowel movement earlier today.    At [place], despite IV fluids, patient required Levophed, transferred to [facility] for ICU care.      Labs on admission with leukocytosis of 13, left shift 84%, bands 4%, H&H 9.1/27.4, , platelet count 306   INR 7.8--9.4, very high   EMMY, creatinine 2.5 (baseline 1.2)  .4 at [place], high   Hemoglobin A1c 5.4   U tox negative   UA negative for UTI   Chest x-ray with hypoventilation, mild cardiomegaly.  No acute infiltrates.  CT head negative for acute pathology     Empirically started on vancomycin and cefepime IV.      ID consult for septic shock.    Upon chart reviewed, patient has history of non healing ulcer on L 2nd toe, completed 6 weeks of oral antibiotics with Augmentin for pan-sensitive Porteus mirabilis for possible osteomyelitis.    4/20:  Interim reviewed,  patient seen examined at bedside, awake, alert, pressors decreased to 0.1, plan for OR later today for sacral decubitus ulcer debridement.  Patient is grateful for the care he has been given at the hospital.  Complaining of bilateral lower extremity pain.  Afebrile.  Labs reviewed, leukocytosis 15.5, left shift 90.8%, no bands, H&H 7.2/20.9, platelet count 275.  INR reversed, 1.3, creatinine 2, trending down, transaminitis /.  Seen by Wound Care yesterday, status post debridement at bedside, wound cultures with GNR, non lactose , pending final.  Micro at [place], blood cultures x2 from 04/18, no growth to date, pending final.    4/21:  Interim reviewed, patient status post I&D by surgery yesterday, as per op-note:  Sacral wound was 5 x 7 cm and was found to have significant amount of underlying necrosis which extended through the subcutaneous fat and muscle layers down to the level of the overlying fascia from the sacrum.  Final wound measured 10 x 12 x 6 cm.  He is still in ICU, recovering, on minimal dose pressors Levophed 0.03, awake, alert, states he is feeling good, about to have breakfast.  Labs reviewed, white count 11.7, left shift 91.1%, improving, H&H 7/20.7, MCV 99, platelet count 262.  INR 1.3.  Kidney function trending down creatinine 1.7, elevated LFTs trending down to 15/149.  Micro reviewed, wound cultures from bedside debridement pansensitive Proteus mirabilis.  OR cultures Gram stain with few WBC, few Gram-positive cocci, rare Gram-negative rods, pending final.     4/22(Ethan) interim reviewed. GPR from blood cultures 4/18 still not identified([facility]). GNR from surgical specimen(soft tissue). Wound vac now in place. Post debridement photos had 80% slough. Remains on and off levophed. He is alert an dcooperative and has no complaints    Antibiotics (From admission, onward)      Start     Stop Route Frequency Ordered    04/21/23 1100  piperacillin-tazobactam 4.5 g in  dextrose 5 % 100 mL IVPB (ready to mix system)         -- IV Every 8 hours (non-standard times) 04/21/23 0983              Review of patient's allergies indicates:   Allergen Reactions    Donepezil Other (See Comments)     AMS    Bactroban [mupirocin calcium] Blisters     Causes Blisters    Shellfish containing products Other (See Comments)     Other reaction(s): Gout  OYSTERS     Past Medical History:   Diagnosis Date    Anemia     Anemia of other chronic disease 09/13/2017    Anemia, chronic renal failure 09/13/2017    Anemia, unspecified 09/13/2017    Anticoagulant long-term use     Aorta aneurysm     Arthritis     Atrial fibrillation     Bacteremia due to Streptococcus 01/08/2022    CAD (coronary artery disease)     CHF (congestive heart failure)     Chronic kidney disease     Clotting disorder 09/13/2017    Colon polyp     Dementia     Diabetes mellitus     not on meds    Diabetes mellitus type II     Diverticulosis     Elevated PSA     Encounter for blood transfusion     Former smoker     General anesthetics causing adverse effect in therapeutic use     TROUBLE COMING OUT OF ANESTHESIA WITH GASTRIC BYPASS    GERD (gastroesophageal reflux disease)     Gout     Hx of colonic polyps     Hypercoagulable state     Hyperlipidemia     Hypertension     MI, old 2010    MTHFR mutation     Myocardial infarction     9/2010    Osteomyelitis of ankle or foot 03/09/2017    Prostate cancer 06/2016    Sleep apnea     Squamous cell carcinoma 01/23/2018    Left helix, imiquimod    Venous stasis     Chronic     Past Surgical History:   Procedure Laterality Date    ABDOMINAL SURGERY      CARDIAC SURGERY      3 STENTS     CAUDAL EPIDURAL STEROID INJECTION N/A 6/22/2020    Procedure: INJECTION, STEROID, SPINE, EPIDURAL, CAUDAL;  Surgeon: Evangelist Bose MD;  Location: [facility] OR;  Service: Pain Management;  Laterality: N/A;    COLONOSCOPY  02/2011    diverticulosis with diverticula with clot,  no active bleeding    COLONOSCOPY N/A 8/24/2016    Procedure: COLONOSCOPY;  Surgeon: Saroj Borjas MD;  Location: [facility] ENDO;  Service: Endoscopy;  Laterality: N/A;    COLONOSCOPY N/A 11/18/2020    Procedure: COLONOSCOPY;  Surgeon: Saroj Borjas MD;  Location: [facility] ENDO;  Service: Endoscopy;  Laterality: N/A;    COLONOSCOPY N/A 10/27/2021    Procedure: COLONOSCOPY;  Surgeon: Saroj Borjas MD;  Location: [facility] ENDO;  Service: Endoscopy;  Laterality: N/A;    EPIDURAL STEROID INJECTION INTO LUMBAR SPINE N/A 6/22/2020    Procedure: Injection-steroid-epidural-lumbar;  Surgeon: Evangelist Bose MD;  Location: [facility] OR;  Service: Pain Management;  Laterality: N/A;  L3 L4 L5 S1    EPIDURAL STEROID INJECTION INTO LUMBAR SPINE N/A 9/21/2020    Procedure: Injection-steroid-epidural-lumbar;  Surgeon: Evangelist Bose MD;  Location: [facility] OR;  Service: Pain Management;  Laterality: N/A;  L5-S1    FUSION, SPINE, CERVICAL N/A 3/24/2023    Procedure: FUSION, SPINE, CERVICAL;  Surgeon: Kike Kc DO;  Location: [facility] OR;  Service: Neurosurgery;  Laterality: N/A;  posterior cervical decompression/fusion C3-T1    GASTRIC BYPASS  2/5/2008    IVC FILTER RETRIEVAL      JOINT REPLACEMENT  1996 and 2001    bi-lat hip replacement/Rt Hip and Lt Hip    RADIOFREQUENCY ABLATION OF LUMBAR MEDIAL BRANCH NERVE AT SINGLE LEVEL Bilateral 1/10/2020    Procedure: Radiofrequency Ablation, Nerve, Spinal, Lumbar, Medial Branch, 1 Level;  Surgeon: Evangelist Bose MD;  Location: [facility] OR;  Service: Pain Management;  Laterality: Bilateral;  L3,L4,L5    RADIOFREQUENCY ABLATION OF LUMBAR MEDIAL BRANCH NERVE AT SINGLE LEVEL Bilateral 11/23/2021    Procedure: Radiofrequency Ablation, Nerve, Spinal, Lumbar, Medial Branch, Bilateral L 3,4,5;  Surgeon: Nile Causey MD;  Location: [facility] OR;  Service: Pain Management;  Laterality: Bilateral;    ROTATOR CUFF REPAIR      Rt shoulder    Stents  8/18/2010    x 3    UPPER GASTROINTESTINAL  ENDOSCOPY  02/2011     Social History     Tobacco Use    Smoking status: Former    Smokeless tobacco: Never    Tobacco comments:     45 yrs ago, only smoked 3-4 packs in his entire life as a teenager   Substance Use Topics    Alcohol use: Not Currently     Comment: rare        Family History   Problem Relation Age of Onset    Heart attack Mother     Heart attack Father     Heart attack Brother     Ulcerative colitis Daughter 35    Lupus Daughter     Colon cancer Neg Hx     Colon polyps Neg Hx     Crohn's disease Neg Hx     Melanoma Neg Hx     Psoriasis Neg Hx     Eczema Neg Hx        Pertinent medications noted: warfarin     Review of Systems:  per Dr. Ocasio  No chills, fever, sweats, weight loss  No change in vision, loss of vision or diplopia  No sinus congestion, purulent nasal discharge, post nasal drip or facial pain  No pain in mouth or throat. No problems with teeth, gums.  No chest pain, palpitations, syncope  No cough, sputum production, shortness of breath, dyspnea on exertion, pleurisy, hemoptysis  No dysphagia, odynophagia  No nausea, vomiting, diarrhea, constipation, blood in stool, or focal abd pain  No dysuria, hesitancy, hematuria, retention, incontinence  No swelling of joints, redness of joints, injuries, or new focal pain  No unusual headaches, dizziness, vertigo,prior fall 3/17/23  No anxiety, depression, substance abuse, sleep disturbance  No bleeding, lymphadenopathy  S/p fall with multiple ecchymosis on face/forehead, arms and legs     Outdoor activities: Resident of [facility], former smoker, no alcohol, 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 Alert and attentive, cooperative, comfortable on room air  Eyes:  Anicteric, EOMI resolving ecchymosis on eyelids b/l and allover face  ENT:  Moist oral mucosa, terrible oral hygiene, missing most of his upper teeth, has 3 lower teeth left, no ulcers, exudates, thrush, nares patent  Neck:  Supple  Lungs: Clear to auscultation b/l  Heart:  S1/S2+, regular rhythm, no murmurs  Abd:  Obese,   non tender to palpation, +BS, soft, non tender, non distended,    :  Ruelas, urine clear  Musc:  Except for R great toe, joints without effusion, swelling,  erythema, synovitis, non-ambulatory  Skin:  Warm, resolving ecchymosis on forehead, face, upper extremities and shins  Wounds: Sacral wound s/p extensive debridement  4/20 Sacral wound with dressing in place, not disturbed   4/19: Sacrococcygeal unstageable foul-smelling wound, R great toe with 2 non-healing ulcers, with surrounding redness c/w cellulitis  L foot with lateral unsteageable wound  Neuro:  Following commands, speech is clear, moving all extremities, RUE 5/5, LUE 3/5, RLE 4/5, LLE 4/5  Psych:  Calm, cooperative  Lymphatic:       Extrem: B/l legs with dressing in placed, not disturbed   VAD:  R femoral line 4/18    R a-line 4/18    L peripheral IV      Isolation: None    4/21:        4/19:      Sacrum             Sacrum                  Resolving ecchymosis face      R great toe  L lateral foot - unstageable wound    General Labs reviewed:  Recent Labs   Lab 04/21/23  0340 04/21/23  1247 04/22/23  0325   WBC 11.76 10.14 12.08   HGB 7.0* 7.3* 7.6*   HCT 20.7* 21.6* 23.1*    272 279       Recent Labs   Lab 04/20/23  0434 04/21/23  0340 04/22/23  0325    139 143   K 3.7 3.7 4.4   * 115* 118*   CO2 19* 19* 20*   BUN 55* 48* 44*   CREATININE 2.0* 1.7* 1.5*   CALCIUM 8.4* 8.7 8.7   PROT 4.9* 4.5*  5.0*   BILITOT 1.2* 1.0 1.0   ALKPHOS 299* 303* 395*   * 149* 209*   * 215* 299*     Recent Labs   Lab 04/18/23  1328 04/22/23  0325   .4* 12.56*     No results for input(s): SEDRATE in the last 168 hours.    Estimated Creatinine Clearance: 48.9 mL/min (A) (based on SCr of 1.5 mg/dL (H)).       Prior Micro:   Wound cultures left foot 10/18/2022 pansensitive Proteus mirabilis  Wound cultures left foot 08/24/2022 pansensitive Proteus mirabilis, and Porphyromonas somerae    Micro:  Blood cultures x 2 4/18 at [facility] 1/4 bottles GPR, not identified on biofire, pending final     Blood culture [[id number]] Collected: 04/18/23 1732   Order Status: Completed Specimen: Blood from Antecubital, Right Updated: 04/20/23 2212    Blood Culture, Routine No Growth to date     No Growth to date     No Growth to date   Blood culture [[id number]] Collected: 04/18/23 1659   Order Status: Completed Specimen: Blood from Antecubital, Right Arm Updated: 04/21/23 0424    Blood Culture, Routine Gram stain miroslava bottle: Gram positive rods     Results called to and read back by:Dipesh Gotti RN 04/21/2023     04:23   Rapid Organism ID by PCR (from Blood culture) [[id number]] Collected: 04/18/23 1659   Order Status: Completed Updated: 04/21/23 0609    Enterococcus faecalis Not Detected    Enterococcus faecium Not Detected    Listeria Monocytogenes Not Detected    Staphylococcus spp. Not Detected    Staphylococcus aureus Not Detected    Staphylococcus epidermidis Not Detected    Staphylococcus lugdunensis Not Detected    Streptococcus species Not Detected    Streptococcus agalactiae Not Detected    Streptococcus pneumoniae Not Detected    Streptococcus pyogenes Not Detected    Acinetobacter calcoaceticus/baumannii complex Not Detected    Bacteroides fragilis Not Detected    Enterobacerales Not Detected    Enterobacter cloacae complex Not Detected    Escherichia Not Detected    Klebsiella aerogenes Not Detected    Klebsiella  oxytoca Not Detected    Klebsiella pneumoniae group Not Detected    Proteus Not Detected    Salmonella sp Not Detected    Serratia marcescens Not Detected    Haemophilus influenzae Not Detected    Neisseria meningtidis Not Detected    Pseudomonas aeruginosa Not Detected    Stenotrophomonas maltophilia Not Detected    Candida albicans Not Detected    Candida auris Not Detected    Candida glabrata Not Detected    Candida krusei Not Detected    Candida parapsilosis Not Detected    Candida tropicalis Not Detected    Cryptococcus neoformans/gattii Not Detected    CTX-M (ESBL ) Not Detected    IMP (Carbapenem resistant) Not Detected    KPC resistance gene (Carbapenem resistant) Not Detected    mcr-1  Not Detected    mec A/C  Not Detected    mec A/C and MREJ (MRSA) gene Not Detected    NDM (Carbapenem resistant) Not Detected    OXA-48-like (Carbapenem resistant) Not Detected    van A/B (VRE gene) Not Detected    VIM (Carbapenem resistant) Not Detected       Microbiology Results (last 7 days)       Procedure Component Value Units Date/Time    Aerobic culture [[id number]]  (Abnormal) Collected: 04/20/23 1901    Order Status: Completed Specimen: Wound from Sacrum Updated: 04/22/23 0821     Aerobic Bacterial Culture GRAM NEGATIVE VON, NON-LACTOSE   Few  Identification and susceptibility pending      Narrative:      Sacral Wound    Blood culture [[id number]] Collected: 04/21/23 1247    Order Status: Completed Specimen: Blood Updated: 04/21/23 1917     Blood Culture, Routine No Growth to date    Narrative:      Collection has been rescheduled by RE1 at 04/21/2023 12:33 Reason:   Unable to collect  Collection has been rescheduled by RE1 at 04/21/2023 12:33 Reason:   Unable to collect    Culture, Anaerobic [[id number]] Collected: 04/19/23 1239    Order Status: Completed Specimen: Wound from Sacrum Updated: 04/21/23 1256     Anaerobic Culture Culture in progress    Narrative:      Sacrum  (SWAB)    Gram stain  [[id number]] Collected: 04/20/23 1901    Order Status: Completed Specimen: Wound from Sacrum Updated: 04/21/23 1130     Gram Stain Result Few WBC's      Few Gram positive cocci      Rare Gram negative rods    Narrative:      Sacral Wound    Aerobic culture [[id number]]  (Abnormal)  (Susceptibility) Collected: 04/19/23 1130    Order Status: Completed Specimen: Wound from Sacrum Updated: 04/21/23 0747     Aerobic Bacterial Culture PROTEUS MIRABILIS  Moderate      Narrative:      Sacrum  (SWAB)    AFB Culture & Smear [[id number]] Collected: 04/20/23 1901    Order Status: Sent Specimen: Wound from Sacrum Updated: 04/20/23 1916    Fungus culture [[id number]] Collected: 04/20/23 1901    Order Status: Sent Specimen: Wound from Sacrum Updated: 04/20/23 1915    Culture, Anaerobe [[id number]] Collected: 04/20/23 1901    Order Status: Sent Specimen: Wound from Sacrum Updated: 04/20/23 1915    MRSA Screen by PCR [[id number]] Collected: 04/19/23 0940    Order Status: Completed Specimen: Nasopharyngeal Swab from Nasal Updated: 04/19/23 1153     MRSA SCREEN BY PCR Negative           Collected: 04/19/23 1130   Order Status: Completed Specimen: Wound from Sacrum Updated: 04/21/23 0747    Aerobic Bacterial Culture PROTEUS MIRABILIS   Moderate    Abnormal    Narrative:     Sacrum  (SWAB)   Susceptibility     Proteus mirabilis     CULTURE, AEROBIC  (SPECIFY SOURCE)     Amp/Sulbactam <=4/2 mcg/mL Sensitive     Ampicillin <=8 mcg/mL Sensitive     Cefazolin <=2 mcg/mL Sensitive     Cefepime <=2 mcg/mL Sensitive     Ceftriaxone <=1 mcg/mL Sensitive     Ciprofloxacin <=1 mcg/mL Sensitive     Ertapenem <=0.5 mcg/mL Sensitive     Gentamicin <=4 mcg/mL Sensitive     Levofloxacin <=2 mcg/mL Sensitive     Meropenem <=1 mcg/mL Sensitive     Piperacillin/Tazo <=16 mcg/mL Sensitive     Tobramycin <=4 mcg/mL Sensitive     Trimeth/Sulfa <=2/38 mcg/mL Sensitive              Imaging Reviewed:  CXR  CT head   X-ray R foot: No radiographic evidence of  osteomyelitis.  LE Arterial US: No clinically significant stenosis, large vessel occlusion or aneurysm in the visualized arteries of the lower extremities.     Cardiology:       IMPRESSION & PLAN     Septic shock in the setting of unstageable foul-smelling sacrococcygeal ulcer s/p I&D by Surgery 4/20, GNR from culture   at [facility], high  Blood cultures x 2 at [facility] 1/4 bottles GPR, pending ID   Bedside culture 4/19 Pansensitive Proteus mirabilis        2. B/l feet non healing ulcers, likely DTIs from being bed-bound    3. EMMY, cr 2.0, baseline 1.7, trending down    4. Prior supratherapeutic INR, 9.4, reversed 1.3 today     5. PMHx: HTN, DM, CAD s/p MI and cardiac stents, AFib, MTHFR mutation on Coumadin, HLD, gout, ongoing dementia. Recent fall and cervical fracture 3/2022      Recommendations:  Follow OR cultures   Repeat blood cultures   Continue Zosyn 4.5g IV q8h, 4h infusion, dose as per crcl for Proteus mirabilis, awaiting final OR cultures  Trend crp   Move femoral line ASAP  Wound care to all affected areas, will likely need a wound vac  He would likely benefit from colostomy in the near future to prevent further infections of sacrococcygeal wound since he is non-ambulatory   Aspiration precautions   ?LTAC at discharge  D/w patient, nursing,          Medical Decision Making during this encounter was  [_] Low Complexity  [_] Moderate Complexity  [xx] High Complexity

## 2023-04-23 LAB
ALBUMIN SERPL BCP-MCNC: 1.6 G/DL (ref 3.5–5.2)
ALP SERPL-CCNC: 319 U/L (ref 55–135)
ALT SERPL W/O P-5'-P-CCNC: 134 U/L (ref 10–44)
ANION GAP SERPL CALC-SCNC: 4 MMOL/L (ref 8–16)
AST SERPL-CCNC: 113 U/L (ref 10–40)
BACTERIA BLD CULT: ABNORMAL
BACTERIA BLD CULT: NORMAL
BACTERIA SPEC AEROBE CULT: ABNORMAL
BACTERIA SPEC ANAEROBE CULT: NORMAL
BASOPHILS # BLD AUTO: 0.03 K/UL (ref 0–0.2)
BASOPHILS NFR BLD: 0.3 % (ref 0–1.9)
BILIRUB SERPL-MCNC: 1.1 MG/DL (ref 0.1–1)
BUN SERPL-MCNC: 43 MG/DL (ref 8–23)
CALCIUM SERPL-MCNC: 8.5 MG/DL (ref 8.7–10.5)
CHLORIDE SERPL-SCNC: 115 MMOL/L (ref 95–110)
CO2 SERPL-SCNC: 21 MMOL/L (ref 23–29)
CREAT SERPL-MCNC: 1.6 MG/DL (ref 0.5–1.4)
DIFFERENTIAL METHOD: ABNORMAL
EOSINOPHIL # BLD AUTO: 0.3 K/UL (ref 0–0.5)
EOSINOPHIL NFR BLD: 2.5 % (ref 0–8)
ERYTHROCYTE [DISTWIDTH] IN BLOOD BY AUTOMATED COUNT: 14.3 % (ref 11.5–14.5)
EST. GFR  (NO RACE VARIABLE): 44.7 ML/MIN/1.73 M^2
GLUCOSE SERPL-MCNC: 101 MG/DL (ref 70–110)
GLUCOSE SERPL-MCNC: 114 MG/DL (ref 70–110)
GLUCOSE SERPL-MCNC: 81 MG/DL (ref 70–110)
GLUCOSE SERPL-MCNC: 90 MG/DL (ref 70–110)
HCT VFR BLD AUTO: 22.7 % (ref 40–54)
HGB BLD-MCNC: 7.4 G/DL (ref 14–18)
IMM GRANULOCYTES # BLD AUTO: 0.16 K/UL (ref 0–0.04)
IMM GRANULOCYTES NFR BLD AUTO: 1.5 % (ref 0–0.5)
INR PPP: 1.8 (ref 0.8–1.2)
LYMPHOCYTES # BLD AUTO: 0.7 K/UL (ref 1–4.8)
LYMPHOCYTES NFR BLD: 6.3 % (ref 18–48)
MAGNESIUM SERPL-MCNC: 1.9 MG/DL (ref 1.6–2.6)
MCH RBC QN AUTO: 32.6 PG (ref 27–31)
MCHC RBC AUTO-ENTMCNC: 32.6 G/DL (ref 32–36)
MCV RBC AUTO: 100 FL (ref 82–98)
MONOCYTES # BLD AUTO: 0.3 K/UL (ref 0.3–1)
MONOCYTES NFR BLD: 2.8 % (ref 4–15)
NEUTROPHILS # BLD AUTO: 9.4 K/UL (ref 1.8–7.7)
NEUTROPHILS NFR BLD: 86.6 % (ref 38–73)
NRBC BLD-RTO: 0 /100 WBC
PHOSPHATE SERPL-MCNC: 3.8 MG/DL (ref 2.7–4.5)
PLATELET # BLD AUTO: 275 K/UL (ref 150–450)
PMV BLD AUTO: 10.3 FL (ref 9.2–12.9)
POTASSIUM SERPL-SCNC: 4.2 MMOL/L (ref 3.5–5.1)
PROT SERPL-MCNC: 4.5 G/DL (ref 6–8.4)
PROTHROMBIN TIME: 19.2 SEC (ref 9–12.5)
RBC # BLD AUTO: 2.27 M/UL (ref 4.6–6.2)
SODIUM SERPL-SCNC: 140 MMOL/L (ref 136–145)
WBC # BLD AUTO: 10.8 K/UL (ref 3.9–12.7)

## 2023-04-23 PROCEDURE — 63600175 PHARM REV CODE 636 W HCPCS: Performed by: STUDENT IN AN ORGANIZED HEALTH CARE EDUCATION/TRAINING PROGRAM

## 2023-04-23 PROCEDURE — 25000003 PHARM REV CODE 250: Performed by: STUDENT IN AN ORGANIZED HEALTH CARE EDUCATION/TRAINING PROGRAM

## 2023-04-23 PROCEDURE — 84100 ASSAY OF PHOSPHORUS: CPT | Performed by: STUDENT IN AN ORGANIZED HEALTH CARE EDUCATION/TRAINING PROGRAM

## 2023-04-23 PROCEDURE — 85610 PROTHROMBIN TIME: CPT | Performed by: STUDENT IN AN ORGANIZED HEALTH CARE EDUCATION/TRAINING PROGRAM

## 2023-04-23 PROCEDURE — 36415 COLL VENOUS BLD VENIPUNCTURE: CPT | Performed by: STUDENT IN AN ORGANIZED HEALTH CARE EDUCATION/TRAINING PROGRAM

## 2023-04-23 PROCEDURE — 85025 COMPLETE CBC W/AUTO DIFF WBC: CPT | Performed by: STUDENT IN AN ORGANIZED HEALTH CARE EDUCATION/TRAINING PROGRAM

## 2023-04-23 PROCEDURE — 83735 ASSAY OF MAGNESIUM: CPT | Performed by: STUDENT IN AN ORGANIZED HEALTH CARE EDUCATION/TRAINING PROGRAM

## 2023-04-23 PROCEDURE — 80053 COMPREHEN METABOLIC PANEL: CPT | Performed by: STUDENT IN AN ORGANIZED HEALTH CARE EDUCATION/TRAINING PROGRAM

## 2023-04-23 PROCEDURE — 12000002 HC ACUTE/MED SURGE SEMI-PRIVATE ROOM

## 2023-04-23 PROCEDURE — 82962 GLUCOSE BLOOD TEST: CPT

## 2023-04-23 RX ADMIN — MIDODRINE HYDROCHLORIDE 5 MG: 2.5 TABLET ORAL at 04:04

## 2023-04-23 RX ADMIN — FERROUS SULFATE TAB 325 MG (65 MG ELEMENTAL FE) 1 EACH: 325 (65 FE) TAB at 08:04

## 2023-04-23 RX ADMIN — AMIODARONE HYDROCHLORIDE 200 MG: 200 TABLET ORAL at 09:04

## 2023-04-23 RX ADMIN — FAMOTIDINE 20 MG: 20 TABLET ORAL at 08:04

## 2023-04-23 RX ADMIN — PIPERACILLIN AND TAZOBACTAM 4.5 G: 4; .5 INJECTION, POWDER, LYOPHILIZED, FOR SOLUTION INTRAVENOUS at 12:04

## 2023-04-23 RX ADMIN — SODIUM CHLORIDE: 0.9 INJECTION, SOLUTION INTRAVENOUS at 04:04

## 2023-04-23 RX ADMIN — COLLAGENASE SANTYL: 250 OINTMENT TOPICAL at 09:04

## 2023-04-23 RX ADMIN — ASPIRIN 81 MG: 81 TABLET, COATED ORAL at 08:04

## 2023-04-23 RX ADMIN — AMIODARONE HYDROCHLORIDE 200 MG: 200 TABLET ORAL at 08:04

## 2023-04-23 RX ADMIN — CALCITRIOL CAPSULES 0.25 MCG 0.5 MCG: 0.25 CAPSULE ORAL at 08:04

## 2023-04-23 RX ADMIN — THERA TABS 1 TABLET: TAB at 08:04

## 2023-04-23 RX ADMIN — MIDODRINE HYDROCHLORIDE 5 MG: 2.5 TABLET ORAL at 05:04

## 2023-04-23 RX ADMIN — CHLORHEXIDINE GLUCONATE 15 ML: 1.2 RINSE ORAL at 09:04

## 2023-04-23 RX ADMIN — PIPERACILLIN AND TAZOBACTAM 4.5 G: 4; .5 INJECTION, POWDER, LYOPHILIZED, FOR SOLUTION INTRAVENOUS at 05:04

## 2023-04-23 RX ADMIN — PIPERACILLIN AND TAZOBACTAM 4.5 G: 4; .5 INJECTION, POWDER, LYOPHILIZED, FOR SOLUTION INTRAVENOUS at 09:04

## 2023-04-23 RX ADMIN — ALLOPURINOL 100 MG: 100 TABLET ORAL at 09:04

## 2023-04-23 RX ADMIN — ATORVASTATIN CALCIUM 80 MG: 40 TABLET, FILM COATED ORAL at 09:04

## 2023-04-23 RX ADMIN — ENOXAPARIN SODIUM 100 MG: 100 INJECTION SUBCUTANEOUS at 11:04

## 2023-04-23 NOTE — CLINICAL REVIEW
Reviewed cultures of wounds and blood.  GPR from 4/18 identified as Eggerthela lenta, which is an anaerobe. I found conflicting articles regarding sensitivities, but I feel that zosyn is appropriate. Given very sensitive Proteus and sensitive Morganella from wounds, he could also be treated with rocephin/flagyl.    Dr. Ocasio will f/u tomorrow.

## 2023-04-23 NOTE — PROGRESS NOTES
[facility] Medicine  Progress Note    Patient name: Richard Bustos  MRN: [id number]  Admit Date: 4/19/2023   LOS: 4 days     SUBJECTIVE:     Principal problem: Septic shock  Chief Complaint   Patient presents with    Shortness of Breath       Interval History:  No acute events overnight. Hgb stabilized. Out of ICU. Continuing IV abx. Wound vac.       Scheduled Meds:   allopurinoL  100 mg Oral QHS    amiodarone  200 mg Oral BID    aspirin  81 mg Oral Daily    atorvastatin  80 mg Oral Daily    calcitRIOL  0.5 mcg Oral Daily    chlorhexidine  15 mL Mouth/Throat BID    collagenase   Topical (Top) Daily    enoxparin  1 mg/kg Subcutaneous Q12H    famotidine  20 mg Oral Daily    ferrous sulfate  1 tablet Oral Daily    midodrine  5 mg Oral TID    multivitamin  1 tablet Oral Daily    piperacillin-tazobactam (ZOSYN) IVPB  4.5 g Intravenous Q8H     Continuous Infusions:   sodium chloride 0.9% 75 mL/hr at 04/23/23 0805     PRN Meds:sodium chloride, acetaminophen, calcium gluconate IVPB, calcium gluconate IVPB, calcium gluconate IVPB, dextrose 50%, dextrose 50%, glucagon (human recombinant), glucose, glucose, insulin aspart U-100, LIDOcaine HCl 2%, magnesium sulfate IVPB, magnesium sulfate IVPB, melatonin, ondansetron, polyethylene glycol, potassium chloride **AND** potassium chloride **AND** potassium chloride, sodium phosphate IVPB, sodium phosphate IVPB, sodium phosphate IVPB    Review of patient's allergies indicates:   Allergen Reactions    Donepezil Other (See Comments)     AMS    Bactroban [mupirocin calcium] Blisters     Causes Blisters    Shellfish containing products Other (See Comments)     Other reaction(s): Gout  OYSTERS       Review of Systems: As per interval history    OBJECTIVE:     Vital Signs (Most Recent)  Temp: 98.3 °F (36.8 °C) (04/23/23 0730)  Pulse: 60 (04/23/23 0730)  Resp: 16 (04/23/23 0730)  BP: (!) 126/55 (04/23/23 0316)  SpO2: 100 % (04/23/23 0730)    Vital Signs Range (Last  24H):  Temp:  [97.6 °F (36.4 °C)-98.3 °F (36.8 °C)]   Pulse:  [60-68]   Resp:  []   BP: (107-127)/(55-71)   SpO2:  [93 %-100 %]   Arterial Line BP: (143-154)/(52-64)     I & O (Last 24H):  Intake/Output Summary (Last 24 hours) at 4/23/2023 0957  Last data filed at 4/23/2023 0608  Gross per 24 hour   Intake 2291 ml   Output 1025 ml   Net 1266 ml       Physical Exam:  General: Patient resting comfortably in no acute distress. Appears as stated age. Calm  Eyes: No conjunctival injection. No scleral icterus.  ENT: Hearing grossly intact. No discharge from ears. No nasal discharge.   Neck: Supple, trachea midline. No JVD  CVS: RRR. No LE edema BL  Lungs: CTA BL, no wheezing or crackles. Good breath sounds. No accessory muscle use. No acute respiratory distress  Abdomen:  Soft, nontender and nondistended.  No organomegaly  Neuro: AOx3. Moves all extremities. Follows commands. Responds appropriately   Skin:  sacral decub, bilateral feet with skin decay    Laboratory:  I have reviewed all pertinent lab results within the past 24 hours.    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 Continue anticoagulation as described below         BPH with obstruction/lower urinary tract symptoms [N40.1, N13.8]  Yes    Diabetic neuropathy [E11.40]  Yes    OA (osteoarthritis). post bilateral THR, 2001 [M19.90]  Yes    Long term (current) use of anticoagulants [Z79.01]  Not Applicable     Risk vs benefit of anticoagulation medication discussed at length with patient and daughter.  With his history of MTHFR, a fib and hypercoagulability the benefit preventing of thrombus formation/stent reocclusion is difficult to balance with risk of bleeding.  That said, we will continue anticoagulation and reassess if further episodes of bleeding occur.   Monitor.        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         Plan:   - Continue zosyn per ID recs  - Hgb is improving  - renal function improving  - start lovenox for AC  - continue IVF  - nutrition eval  - continue midodrine  - follow culture results  - will follow up with Dr Martinez regarding possible need for colostomy  - appreciate wound care  - diet  - appreciate surgery recommendations  - appreciate ID recommendations  - continue iron supplementation  - amiodarone for afib.       VTE Risk Mitigation (From admission, onward)           Ordered     enoxaparin injection 100 mg  Every 12 hours (non-standard times)         04/22/23 1030     Place KURTIS hose  Until discontinued          04/19/23 0157     IP VTE HIGH RISK PATIENT  Once         04/19/23 0157                      Department Hospital Medicine  [facility]  Brennen Castillo MD  Date of service: 04/23/2023

## 2023-04-23 NOTE — PLAN OF CARE
Problem: Adult Inpatient Plan of Care  Goal: Plan of Care Review  Outcome: Ongoing, Progressing  Goal: Patient-Specific Goal (Individualized)  Outcome: Ongoing, Progressing  Goal: Absence of Hospital-Acquired Illness or Injury  Outcome: Ongoing, Progressing  Goal: Optimal Comfort and Wellbeing  Outcome: Ongoing, Progressing  Goal: Readiness for Transition of Care  Outcome: Ongoing, Progressing     Problem: Fluid and Electrolyte Imbalance (Acute Kidney Injury/Impairment)  Goal: Fluid and Electrolyte Balance  Outcome: Ongoing, Progressing     Problem: Oral Intake Inadequate (Acute Kidney Injury/Impairment)  Goal: Optimal Nutrition Intake  Outcome: Ongoing, Progressing     Problem: Renal Function Impairment (Acute Kidney Injury/Impairment)  Goal: Effective Renal Function  Outcome: Ongoing, Progressing     Problem: Adjustment to Illness (Sepsis/Septic Shock)  Goal: Optimal Coping  Outcome: Ongoing, Progressing

## 2023-04-23 NOTE — PHYSICIAN QUERY
"PT Name: Richard Bustos  MR #: [id number]    DOCUMENTATION CLARIFICATION     CDS/: Samreen Miguel               Contact information:[phone] or through epic messenger  This form is a permanent document in the medical record.    Query Date: April 22, 2023  By submitting this query, we are merely seeking further clarification of documentation. Please utilize your independent clinical judgment when addressing the question(s) below.    The Medical Record contains the following:   Indicator Supporting Clinical Findings Location in Medical Record    Documentation of "Debridement" continued debridement of skin, dermis, subcutaneous  fat utilizing scissors and electrocautery. This extended into the muscle layer and down to the level of the sacrum to include the overlying fascia. Cultures were taken. After debridement all readily accessible nonviable tissue we did encounter some normal  tissue with adequate blood supply.  This wound did extend anteriorly towards the anus and rectum.  It extended all the way down to the level of the underlying sacrum and coccyx.  OR note 4/20          Provider, please provide further clarification on the procedure performed on SACRAL DECUBITUS: SANDY ALL THAT APPLY    [ X  ] Excisional Debridement of skin   [  X ] Excisional Debridement of subcutaneous tissue and/or fascia   [ X  ] Excisional Debridement of muscle        [   ] Non-excisional Debridement of skin   [   ] Non-excisional Debridement of subcutaneous tissue and/or fascia   [   ] Non-excisional Debridement of muscle       [   ] Other Procedure (please specify):              "

## 2023-04-24 LAB
ALBUMIN SERPL BCP-MCNC: 1.6 G/DL (ref 3.5–5.2)
ALP SERPL-CCNC: 269 U/L (ref 55–135)
ALT SERPL W/O P-5'-P-CCNC: 97 U/L (ref 10–44)
ANION GAP SERPL CALC-SCNC: 6 MMOL/L (ref 8–16)
AST SERPL-CCNC: 60 U/L (ref 10–40)
BASOPHILS # BLD AUTO: 0.02 K/UL (ref 0–0.2)
BASOPHILS NFR BLD: 0.2 % (ref 0–1.9)
BILIRUB SERPL-MCNC: 0.9 MG/DL (ref 0.1–1)
BUN SERPL-MCNC: 48 MG/DL (ref 8–23)
CALCIUM SERPL-MCNC: 8.7 MG/DL (ref 8.7–10.5)
CHLORIDE SERPL-SCNC: 117 MMOL/L (ref 95–110)
CO2 SERPL-SCNC: 19 MMOL/L (ref 23–29)
CREAT SERPL-MCNC: 1.8 MG/DL (ref 0.5–1.4)
CREAT UR-MCNC: 91 MG/DL (ref 23–375)
DIFFERENTIAL METHOD: ABNORMAL
EOSINOPHIL # BLD AUTO: 0.1 K/UL (ref 0–0.5)
EOSINOPHIL NFR BLD: 1.1 % (ref 0–8)
ERYTHROCYTE [DISTWIDTH] IN BLOOD BY AUTOMATED COUNT: 14.6 % (ref 11.5–14.5)
EST. GFR  (NO RACE VARIABLE): 38.8 ML/MIN/1.73 M^2
GLUCOSE SERPL-MCNC: 111 MG/DL (ref 70–110)
GLUCOSE SERPL-MCNC: 122 MG/DL (ref 70–110)
GLUCOSE SERPL-MCNC: 87 MG/DL (ref 70–110)
GLUCOSE SERPL-MCNC: 93 MG/DL (ref 70–110)
GLUCOSE SERPL-MCNC: 97 MG/DL (ref 70–110)
HCT VFR BLD AUTO: 21.9 % (ref 40–54)
HGB BLD-MCNC: 7.2 G/DL (ref 14–18)
IMM GRANULOCYTES # BLD AUTO: 0.18 K/UL (ref 0–0.04)
IMM GRANULOCYTES NFR BLD AUTO: 1.6 % (ref 0–0.5)
LYMPHOCYTES # BLD AUTO: 0.8 K/UL (ref 1–4.8)
LYMPHOCYTES NFR BLD: 7.4 % (ref 18–48)
MAGNESIUM SERPL-MCNC: 1.9 MG/DL (ref 1.6–2.6)
MCH RBC QN AUTO: 33.2 PG (ref 27–31)
MCHC RBC AUTO-ENTMCNC: 32.9 G/DL (ref 32–36)
MCV RBC AUTO: 101 FL (ref 82–98)
MONOCYTES # BLD AUTO: 0.3 K/UL (ref 0.3–1)
MONOCYTES NFR BLD: 2.8 % (ref 4–15)
NEUTROPHILS # BLD AUTO: 9.9 K/UL (ref 1.8–7.7)
NEUTROPHILS NFR BLD: 86.9 % (ref 38–73)
NRBC BLD-RTO: 0 /100 WBC
OSMOLALITY SERPL: 304 MOSM/KG (ref 280–300)
OSMOLALITY UR: 427 MOSM/KG (ref 50–1200)
PHOSPHATE SERPL-MCNC: 4 MG/DL (ref 2.7–4.5)
PLATELET # BLD AUTO: 306 K/UL (ref 150–450)
PMV BLD AUTO: 10.3 FL (ref 9.2–12.9)
POTASSIUM SERPL-SCNC: 3.6 MMOL/L (ref 3.5–5.1)
PROT SERPL-MCNC: 4.7 G/DL (ref 6–8.4)
RBC # BLD AUTO: 2.17 M/UL (ref 4.6–6.2)
SODIUM SERPL-SCNC: 142 MMOL/L (ref 136–145)
SODIUM UR-SCNC: 53 MMOL/L (ref 20–250)
WBC # BLD AUTO: 11.38 K/UL (ref 3.9–12.7)

## 2023-04-24 PROCEDURE — 99233 SBSQ HOSP IP/OBS HIGH 50: CPT | Mod: ,,, | Performed by: STUDENT IN AN ORGANIZED HEALTH CARE EDUCATION/TRAINING PROGRAM

## 2023-04-24 PROCEDURE — 83935 ASSAY OF URINE OSMOLALITY: CPT | Performed by: STUDENT IN AN ORGANIZED HEALTH CARE EDUCATION/TRAINING PROGRAM

## 2023-04-24 PROCEDURE — 63600175 PHARM REV CODE 636 W HCPCS: Performed by: STUDENT IN AN ORGANIZED HEALTH CARE EDUCATION/TRAINING PROGRAM

## 2023-04-24 PROCEDURE — 97162 PT EVAL MOD COMPLEX 30 MIN: CPT

## 2023-04-24 PROCEDURE — 99233 PR SUBSEQUENT HOSPITAL CARE,LEVL III: ICD-10-PCS | Mod: ,,, | Performed by: STUDENT IN AN ORGANIZED HEALTH CARE EDUCATION/TRAINING PROGRAM

## 2023-04-24 PROCEDURE — 82962 GLUCOSE BLOOD TEST: CPT

## 2023-04-24 PROCEDURE — 97530 THERAPEUTIC ACTIVITIES: CPT

## 2023-04-24 PROCEDURE — 25000003 PHARM REV CODE 250: Performed by: STUDENT IN AN ORGANIZED HEALTH CARE EDUCATION/TRAINING PROGRAM

## 2023-04-24 PROCEDURE — 80053 COMPREHEN METABOLIC PANEL: CPT | Performed by: STUDENT IN AN ORGANIZED HEALTH CARE EDUCATION/TRAINING PROGRAM

## 2023-04-24 PROCEDURE — S0030 INJECTION, METRONIDAZOLE: HCPCS | Performed by: STUDENT IN AN ORGANIZED HEALTH CARE EDUCATION/TRAINING PROGRAM

## 2023-04-24 PROCEDURE — 36415 COLL VENOUS BLD VENIPUNCTURE: CPT | Performed by: STUDENT IN AN ORGANIZED HEALTH CARE EDUCATION/TRAINING PROGRAM

## 2023-04-24 PROCEDURE — 84100 ASSAY OF PHOSPHORUS: CPT | Performed by: STUDENT IN AN ORGANIZED HEALTH CARE EDUCATION/TRAINING PROGRAM

## 2023-04-24 PROCEDURE — 82570 ASSAY OF URINE CREATININE: CPT | Performed by: STUDENT IN AN ORGANIZED HEALTH CARE EDUCATION/TRAINING PROGRAM

## 2023-04-24 PROCEDURE — 85025 COMPLETE CBC W/AUTO DIFF WBC: CPT | Performed by: STUDENT IN AN ORGANIZED HEALTH CARE EDUCATION/TRAINING PROGRAM

## 2023-04-24 PROCEDURE — 83930 ASSAY OF BLOOD OSMOLALITY: CPT | Performed by: STUDENT IN AN ORGANIZED HEALTH CARE EDUCATION/TRAINING PROGRAM

## 2023-04-24 PROCEDURE — 12000002 HC ACUTE/MED SURGE SEMI-PRIVATE ROOM

## 2023-04-24 PROCEDURE — 84300 ASSAY OF URINE SODIUM: CPT | Performed by: STUDENT IN AN ORGANIZED HEALTH CARE EDUCATION/TRAINING PROGRAM

## 2023-04-24 PROCEDURE — 83735 ASSAY OF MAGNESIUM: CPT | Performed by: STUDENT IN AN ORGANIZED HEALTH CARE EDUCATION/TRAINING PROGRAM

## 2023-04-24 RX ORDER — METRONIDAZOLE 500 MG/100ML
500 INJECTION, SOLUTION INTRAVENOUS
Status: DISCONTINUED | OUTPATIENT
Start: 2023-04-24 | End: 2023-04-28 | Stop reason: HOSPADM

## 2023-04-24 RX ORDER — ENOXAPARIN SODIUM 100 MG/ML
1 INJECTION SUBCUTANEOUS
Status: COMPLETED | OUTPATIENT
Start: 2023-04-24 | End: 2023-04-24

## 2023-04-24 RX ADMIN — ALLOPURINOL 100 MG: 100 TABLET ORAL at 08:04

## 2023-04-24 RX ADMIN — PIPERACILLIN AND TAZOBACTAM 4.5 G: 4; .5 INJECTION, POWDER, LYOPHILIZED, FOR SOLUTION INTRAVENOUS at 05:04

## 2023-04-24 RX ADMIN — PIPERACILLIN AND TAZOBACTAM 4.5 G: 4; .5 INJECTION, POWDER, LYOPHILIZED, FOR SOLUTION INTRAVENOUS at 12:04

## 2023-04-24 RX ADMIN — MIDODRINE HYDROCHLORIDE 5 MG: 2.5 TABLET ORAL at 04:04

## 2023-04-24 RX ADMIN — MIDODRINE HYDROCHLORIDE 5 MG: 2.5 TABLET ORAL at 11:04

## 2023-04-24 RX ADMIN — ASPIRIN 81 MG: 81 TABLET, COATED ORAL at 09:04

## 2023-04-24 RX ADMIN — ATORVASTATIN CALCIUM 80 MG: 40 TABLET, FILM COATED ORAL at 08:04

## 2023-04-24 RX ADMIN — METRONIDAZOLE 500 MG: 5 INJECTION, SOLUTION INTRAVENOUS at 05:04

## 2023-04-24 RX ADMIN — AMIODARONE HYDROCHLORIDE 200 MG: 200 TABLET ORAL at 08:04

## 2023-04-24 RX ADMIN — AMIODARONE HYDROCHLORIDE 200 MG: 200 TABLET ORAL at 09:04

## 2023-04-24 RX ADMIN — FAMOTIDINE 20 MG: 20 TABLET ORAL at 09:04

## 2023-04-24 RX ADMIN — ENOXAPARIN SODIUM 100 MG: 100 INJECTION SUBCUTANEOUS at 05:04

## 2023-04-24 RX ADMIN — CEFTRIAXONE SODIUM 2 G: 2 INJECTION, POWDER, FOR SOLUTION INTRAMUSCULAR; INTRAVENOUS at 05:04

## 2023-04-24 RX ADMIN — FERROUS SULFATE TAB 325 MG (65 MG ELEMENTAL FE) 1 EACH: 325 (65 FE) TAB at 09:04

## 2023-04-24 RX ADMIN — COLLAGENASE SANTYL: 250 OINTMENT TOPICAL at 09:04

## 2023-04-24 RX ADMIN — THERA TABS 1 TABLET: TAB at 09:04

## 2023-04-24 RX ADMIN — CALCITRIOL CAPSULES 0.25 MCG 0.25 MCG: 0.25 CAPSULE ORAL at 09:04

## 2023-04-24 RX ADMIN — SODIUM CHLORIDE: 0.9 INJECTION, SOLUTION INTRAVENOUS at 03:04

## 2023-04-24 NOTE — PROGRESS NOTES
[facility]  Adult Nutrition   Progress Note (Follow-Up)    SUMMARY      Recommendations:   1. Continue modified diet per SLP as tolerated.  2. Continue Ensure HP pudding with meals and Landon BID, thickened to [place] for wound healing needs.  3. Continue MVI.     Goals:   Goals: 1. Intake to be >/= 75% EEN. 2. Labs trend to target range. 3. No further skin issues.    Dietitian Rounds Brief  Patient eating meals and supplements per nursing; less than before. Plan is for pt to be NPO past MN for placement of diverting ostomy secondary to wound contamination risk. Last documented BM 4/23/23.RD to monitor intake, tolerance, labs and bowel function as needed.    Diet order: Dysphagia Mechanical Soft (IDDSIU Level 5) Nectar Thick    Oral Supplement: Ensure HP Pudding with meals and [place] BID    % Intake of Estimated Energy Needs: 25 - 50 %  % Meal Intake: 25 - 50 %    Estimated/Assessed Needs  Weight Used For Calorie Calculations: 100 kg (220 lb 7.4 oz)  Energy Calorie Requirements (kcal): 7558-5606  Energy Need Method: Kcal/kg  Protein Requirements: 105-140 (1.5-2.0 / kg IBW; 1.0-1.4 CBW)  Weight Used For Protein Calculations: 70 kg (154 lb 5.2 oz) (IBW)  Fluid Requirements (mL): 2000 mL (20 mL/kg)  Estimated Fluid Requirement Method: RDA Method  RDA Method (mL): 2000       Weight History:  Wt Readings from Last 5 Encounters:   04/19/23 100.4 kg (221 lb 5.5 oz)   04/18/23 104.3 kg (230 lb)   04/03/23 104 kg (229 lb 4.5 oz)   03/26/23 112 kg (246 lb 14.6 oz)   03/23/23 111.1 kg (244 lb 14.9 oz)        Reason for Assessment  Reason For Assessment: consult  Diagnosis: infection/sepsis  Relevant Medical History: HTN, CAD s/p cardiac stents, lumbar DDD with chronic bilat LE weakness, HLD, gout, MI, Chronic SHF, arthritis, A-fib on Coumadin, DM II, dementia, and MTHFR mutation, and large non-healing sacral wound    Medications:Pertinent Medications Reviewed  Scheduled Meds:   allopurinoL  100 mg Oral QHS     amiodarone  200 mg Oral BID    atorvastatin  80 mg Oral Daily    calcitRIOL  0.5 mcg Oral Daily    collagenase   Topical (Top) Daily    enoxparin  1 mg/kg Subcutaneous Q12H    famotidine  20 mg Oral Daily    ferrous sulfate  1 tablet Oral Daily    midodrine  5 mg Oral TID    multivitamin  1 tablet Oral Daily    piperacillin-tazobactam (ZOSYN) IVPB  4.5 g Intravenous Q8H     Continuous Infusions:   sodium chloride 0.9% 50 mL/hr at 04/24/23 1150     PRN Meds:.sodium chloride, acetaminophen, calcium gluconate IVPB, calcium gluconate IVPB, calcium gluconate IVPB, dextrose 50%, dextrose 50%, glucagon (human recombinant), glucose, glucose, insulin aspart U-100, LIDOcaine HCl 2%, magnesium sulfate IVPB, magnesium sulfate IVPB, melatonin, ondansetron, polyethylene glycol, potassium chloride **AND** potassium chloride **AND** potassium chloride, sodium phosphate IVPB, sodium phosphate IVPB, sodium phosphate IVPB    Labs: Pertinent Labs Reviewed  Clinical Chemistry:  Recent Labs   Lab 04/22/23  0325 04/23/23  0509 04/24/23  0452    140 142   K 4.4 4.2 3.6   * 115* 117*   CO2 20* 21* 19*   GLU 97 81 87   BUN 44* 43* 48*   CREATININE 1.5* 1.6* 1.8*   CALCIUM 8.7 8.5* 8.7   PROT 5.0* 4.5* 4.7*   ALBUMIN 1.7* 1.6* 1.6*   BILITOT 1.0 1.1* 0.9   ALKPHOS 395* 319* 269*   * 113* 60*   * 134* 97*   ANIONGAP 5* 4* 6*   MG 2.0 1.9 1.9   PHOS 3.1 3.8 4.0     CBC:   Recent Labs   Lab 04/24/23  0452   WBC 11.38   RBC 2.17*   HGB 7.2*   HCT 21.9*      *   MCH 33.2*   MCHC 32.9     Lipid Panel:  No results for input(s): CHOL, HDL, LDLCALC, TRIG, CHOLHDL in the last 168 hours.  Cardiac Profile:  Recent Labs   Lab 04/18/23  1328   TROPONINI 0.101*     Inflammatory Labs:  Recent Labs   Lab 04/18/23  1328 04/22/23  0325   .4* 12.56*     Diabetes:  Recent Labs   Lab 04/19/23  0215   HGBA1C 5.4     Thyroid & Parathyroid:  No results for input(s): TSH, FREET4, K2TGWAJ, O5BFSSQ, THYROIDAB in the last  168 hours.    Monitor and Evaluation  Food and Nutrient Intake: energy intake, food and beverage intake  Food and Nutrient Adminstration: diet order  Knowledge/Beliefs/Attitudes: food and nutrition knowledge/skill  Physical Activity and Function: nutrition-related ADLs and IADLs  Anthropometric Measurements: weight change, body mass index, weight  Biochemical Data, Medical Tests and Procedures: electrolyte and renal panel, gastrointestinal profile, glucose/endocrine profile, inflammatory profile, lipid profile  Nutrition-Focused Physical Findings: overall appearance     Nutrition Risk  Level of Risk/Frequency of Follow-up: high     Nutrition Follow-Up  RD Follow-up?: Yes    Carrie Butcher RD 04/24/2023 3:36 PM

## 2023-04-24 NOTE — PLAN OF CARE
Problem: Adult Inpatient Plan of Care  Goal: Plan of Care Review  Outcome: Ongoing, Not Progressing  Goal: Patient-Specific Goal (Individualized)  Outcome: Ongoing, Not Progressing  Goal: Absence of Hospital-Acquired Illness or Injury  Outcome: Ongoing, Not Progressing  Goal: Optimal Comfort and Wellbeing  Outcome: Ongoing, Not Progressing  Goal: Readiness for Transition of Care  Outcome: Ongoing, Not Progressing     Problem: Fluid and Electrolyte Imbalance (Acute Kidney Injury/Impairment)  Goal: Fluid and Electrolyte Balance  Outcome: Ongoing, Not Progressing     Problem: Oral Intake Inadequate (Acute Kidney Injury/Impairment)  Goal: Optimal Nutrition Intake  Outcome: Ongoing, Not Progressing     Problem: Renal Function Impairment (Acute Kidney Injury/Impairment)  Goal: Effective Renal Function  Outcome: Ongoing, Not Progressing     Problem: Adjustment to Illness (Sepsis/Septic Shock)  Goal: Optimal Coping  Outcome: Ongoing, Not Progressing     Problem: Bleeding (Sepsis/Septic Shock)  Goal: Absence of Bleeding  Outcome: Ongoing, Not Progressing     Problem: Infection Progression (Sepsis/Septic Shock)  Goal: Absence of Infection Signs and Symptoms  Outcome: Ongoing, Not Progressing     Problem: Nutrition Impaired (Sepsis/Septic Shock)  Goal: Optimal Nutrition Intake  Outcome: Ongoing, Not Progressing     Problem: Infection  Goal: Absence of Infection Signs and Symptoms  Outcome: Ongoing, Not Progressing     Problem: Impaired Wound Healing  Goal: Optimal Wound Healing  Outcome: Ongoing, Not Progressing     Problem: Fall Injury Risk  Goal: Absence of Fall and Fall-Related Injury  Outcome: Ongoing, Not Progressing     Problem: Skin Injury Risk Increased  Goal: Skin Health and Integrity  Outcome: Ongoing, Not Progressing     Problem: Malnutrition  Goal: Improved Nutritional Intake  Outcome: Ongoing, Not Progressing

## 2023-04-24 NOTE — PROGRESS NOTES
Progress Note  Infectious Disease    Reason for Consult:  Septic shock     HPI: Richard Bustos is a 77 y.o. male obese, BMI 33/6 Kg/m2, with past medical history of HTN, DM, CAD s/p MI and cardiac stents, AFib, MTHFR mutation on Coumadin, HLD, gout, ongoing dementia and prior fall on 3/17/23 which resulted in head trauma, and cervical fracture with cord edema status post cervical spine fusion on 03/24/2023 by Neurosurgery.  Patient was discharged to [facility] nursing [facility] [place] to continue recovery.  Unfortunately, since he has been bed-bound since his fall, he has developed unstageable sacrococcygeal wound.    Daughters at bedside providing most of the history, patient was found altered and hypotensive at facility yesterday, sent to [facility] ER for further workup.    Patient does not remember what happened yesterday, he is aware he is at the hospital in ICU, denies any fever or chills, no headache, no nausea or vomiting, no cough, no shortness of breath, no abdominal pain, no dysuria or increased urinary frequency, no changes in the color of the urine, reports having diarrhea a few days ago, had a tiny bowel movement earlier today.    At [facility], despite IV fluids, patient required Levophed, transferred to [facility] for ICU care.      Labs on admission with leukocytosis of 13, left shift 84%, bands 4%, H&H 9.1/27.4, , platelet count 306   INR 7.8--9.4, very high   EMMY, creatinine 2.5 (baseline 1.2)  .4 at [facility], high   Hemoglobin A1c 5.4   U tox negative   UA negative for UTI   Chest x-ray with hypoventilation, mild cardiomegaly.  No acute infiltrates.  CT head negative for acute pathology     Empirically started on vancomycin and cefepime IV.      ID consult for septic shock.    Upon chart reviewed, patient has history of non healing ulcer on L 2nd toe, completed 6 weeks of oral antibiotics with Augmentin for pan-sensitive Porteus mirabilis for possible osteomyelitis.    4/20:  Interim reviewed,  patient seen examined at bedside, awake, alert, pressors decreased to 0.1, plan for OR later today for sacral decubitus ulcer debridement.  Patient is grateful for the care he has been given at the hospital.  Complaining of bilateral lower extremity pain.  Afebrile.  Labs reviewed, leukocytosis 15.5, left shift 90.8%, no bands, H&H 7.2/20.9, platelet count 275.  INR reversed, 1.3, creatinine 2, trending down, transaminitis /.  Seen by Wound Care yesterday, status post debridement at bedside, wound cultures with GNR, non lactose , pending final.  Micro at [facility], blood cultures x2 from 04/18, no growth to date, pending final.    4/21:  Interim reviewed, patient status post I&D by surgery yesterday, as per op-note:  Sacral wound was 5 x 7 cm and was found to have significant amount of underlying necrosis which extended through the subcutaneous fat and muscle layers down to the level of the overlying fascia from the sacrum.  Final wound measured 10 x 12 x 6 cm.  He is still in ICU, recovering, on minimal dose pressors Levophed 0.03, awake, alert, states he is feeling good, about to have breakfast.  Labs reviewed, white count 11.7, left shift 91.1%, improving, H&H 7/20.7, MCV 99, platelet count 262.  INR 1.3.  Kidney function trending down creatinine 1.7, elevated LFTs trending down to 15/149.  Micro reviewed, wound cultures from bedside debridement pansensitive Proteus mirabilis.  OR cultures Gram stain with few WBC, few Gram-positive cocci, rare Gram-negative rods, pending final.     4/22(Ethan) interim reviewed. GPR from blood cultures 4/18 still not identified([facility]). GNR from surgical specimen(soft tissue). Wound vac now in place. Post debridement photos had 80% slough. Remains on and off levophed. He is alert an dcooperative and has no complaints.    4/24 (Amarjit): Interim reviewed, discussed with Dr Long. Patient seen and examined at bedside, he seems confused, discussed with  Hospitalist, seems like he has been having some delirium for the last couple of days, he keeps repeating they are picking up tomorrow at 7am and he will be back a 8am. He is going for colostomy tomorrow by Surgery, accepted at [facility]. Hemodynamically stale, afebrile. Micro reviewed, no new data.    Antibiotics (From admission, onward)      Start     Stop Route Frequency Ordered    04/21/23 1100  piperacillin-tazobactam 4.5 g in dextrose 5 % 100 mL IVPB (ready to mix system)         -- IV Every 8 hours (non-standard times) 04/21/23 0953              Review of patient's allergies indicates:   Allergen Reactions    Donepezil Other (See Comments)     AMS    Bactroban [mupirocin calcium] Blisters     Causes Blisters    Shellfish containing products Other (See Comments)     Other reaction(s): Gout  OYSTERS     Past Medical History:   Diagnosis Date    Anemia     Anemia of other chronic disease 09/13/2017    Anemia, chronic renal failure 09/13/2017    Anemia, unspecified 09/13/2017    Anticoagulant long-term use     Aorta aneurysm     Arthritis     Atrial fibrillation     Bacteremia due to Streptococcus 01/08/2022    CAD (coronary artery disease)     CHF (congestive heart failure)     Chronic kidney disease     Clotting disorder 09/13/2017    Colon polyp     Dementia     Diabetes mellitus     not on meds    Diabetes mellitus type II     Diverticulosis     Elevated PSA     Encounter for blood transfusion     Former smoker     General anesthetics causing adverse effect in therapeutic use     TROUBLE COMING OUT OF ANESTHESIA WITH GASTRIC BYPASS    GERD (gastroesophageal reflux disease)     Gout     Hx of colonic polyps     Hypercoagulable state     Hyperlipidemia     Hypertension     MI, old 2010    MTHFR mutation     Myocardial infarction     9/2010    Osteomyelitis of ankle or foot 03/09/2017    Prostate cancer 06/2016    Sleep apnea     Squamous cell carcinoma 01/23/2018    Left helix,  imiquimod    Venous stasis     Chronic     Past Surgical History:   Procedure Laterality Date    ABDOMINAL SURGERY      CARDIAC SURGERY      3 STENTS     CAUDAL EPIDURAL STEROID INJECTION N/A 6/22/2020    Procedure: INJECTION, STEROID, SPINE, EPIDURAL, CAUDAL;  Surgeon: Evangelist Bose MD;  Location: [facility] OR;  Service: Pain Management;  Laterality: N/A;    COLONOSCOPY  02/2011    diverticulosis with diverticula with clot, no active bleeding    COLONOSCOPY N/A 8/24/2016    Procedure: COLONOSCOPY;  Surgeon: Saroj Borjas MD;  Location: [facility] ENDO;  Service: Endoscopy;  Laterality: N/A;    COLONOSCOPY N/A 11/18/2020    Procedure: COLONOSCOPY;  Surgeon: Saroj Borjas MD;  Location: [facility] ENDO;  Service: Endoscopy;  Laterality: N/A;    COLONOSCOPY N/A 10/27/2021    Procedure: COLONOSCOPY;  Surgeon: Saroj Borjas MD;  Location: [facility] ENDO;  Service: Endoscopy;  Laterality: N/A;    DEBRIDEMENT OF SACRAL WOUND N/A 4/20/2023    Procedure: DEBRIDEMENT, WOUND, SACRUM;  Surgeon: Mark Martinez Jr., MD;  Location: [facility] OR;  Service: General;  Laterality: N/A;    EPIDURAL STEROID INJECTION INTO LUMBAR SPINE N/A 6/22/2020    Procedure: Injection-steroid-epidural-lumbar;  Surgeon: Evangelist Bose MD;  Location: [facility] OR;  Service: Pain Management;  Laterality: N/A;  L3 L4 L5 S1    EPIDURAL STEROID INJECTION INTO LUMBAR SPINE N/A 9/21/2020    Procedure: Injection-steroid-epidural-lumbar;  Surgeon: Evangelist Bose MD;  Location: [facility] OR;  Service: Pain Management;  Laterality: N/A;  L5-S1    FUSION, SPINE, CERVICAL N/A 3/24/2023    Procedure: FUSION, SPINE, CERVICAL;  Surgeon: Kike Kc DO;  Location: [facility] OR;  Service: Neurosurgery;  Laterality: N/A;  posterior cervical decompression/fusion C3-T1    GASTRIC BYPASS  2/5/2008    IVC FILTER RETRIEVAL      JOINT REPLACEMENT  1996 and 2001    bi-lat hip replacement/Rt Hip and Lt Hip    RADIOFREQUENCY ABLATION OF LUMBAR MEDIAL BRANCH NERVE AT SINGLE  LEVEL Bilateral 1/10/2020    Procedure: Radiofrequency Ablation, Nerve, Spinal, Lumbar, Medial Branch, 1 Level;  Surgeon: Evangelist Bose MD;  Location: [facility] OR;  Service: Pain Management;  Laterality: Bilateral;  L3,L4,L5    RADIOFREQUENCY ABLATION OF LUMBAR MEDIAL BRANCH NERVE AT SINGLE LEVEL Bilateral 11/23/2021    Procedure: Radiofrequency Ablation, Nerve, Spinal, Lumbar, Medial Branch, Bilateral L 3,4,5;  Surgeon: Nile Causey MD;  Location: [facility] OR;  Service: Pain Management;  Laterality: Bilateral;    ROTATOR CUFF REPAIR      Rt shoulder    Stents  8/18/2010    x 3    UPPER GASTROINTESTINAL ENDOSCOPY  02/2011     Social History     Tobacco Use    Smoking status: Former    Smokeless tobacco: Never    Tobacco comments:     45 yrs ago, only smoked 3-4 packs in his entire life as a teenager   Substance Use Topics    Alcohol use: Not Currently     Comment: rare        Family History   Problem Relation Age of Onset    Heart attack Mother     Heart attack Father     Heart attack Brother     Ulcerative colitis Daughter 35    Lupus Daughter     Colon cancer Neg Hx     Colon polyps Neg Hx     Crohn's disease Neg Hx     Melanoma Neg Hx     Psoriasis Neg Hx     Eczema Neg Hx        Pertinent medications noted: warfarin     Review of Systems:   No chills, fever, sweats, weight loss  No change in vision, loss of vision or diplopia  No sinus congestion, purulent nasal discharge, post nasal drip or facial pain  No pain in mouth or throat. No problems with teeth, gums.  No chest pain, palpitations, syncope  No cough, sputum production, shortness of breath, dyspnea on exertion, pleurisy, hemoptysis  No dysphagia, odynophagia  No nausea, vomiting, diarrhea, constipation, blood in stool, or focal abd pain  No dysuria, hesitancy, hematuria, retention, incontinence  No swelling of joints, redness of joints, injuries, or new focal pain  No unusual headaches, dizziness, vertigo,prior fall 3/17/23  No anxiety,  depression, substance abuse, sleep disturbance  No bleeding, lymphadenopathy  S/p fall with multiple ecchymosis on face/forehead, arms and legs     Outdoor activities: Resident of 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 Alert and attentive, cooperative, comfortable on room air  Eyes:  Anicteric, EOMI resolving ecchymosis on eyelids b/l and all over face  ENT:  Moist oral mucosa, terrible oral hygiene, missing most of his upper teeth, has 3 lower teeth left, no ulcers, exudates, thrush, nares patent  Neck:  Supple  Lungs: Clear to auscultation b/l  Heart:  S1/S2+, regular rhythm, no murmurs  Abd:  Obese,   non tender to palpation, +BS, soft, non tender, non distended,    :  Ruelas, urine clear  Musc:  Except for R great toe, joints without effusion, swelling,  erythema, synovitis, non-ambulatory  Skin:  Warm, resolving ecchymosis on forehead, face, upper extremities and shins  Wounds: Sacral wound s/p extensive debridement, wound vac in place  4/20 Sacral wound with dressing in place, not disturbed   4/19: Sacrococcygeal unstageable foul-smelling wound, R great toe with 2 non-healing ulcers, with surrounding redness c/w cellulitis  L foot with lateral unsteageable wound  Neuro:  Following commands, speech is clear, moving all extremities, RUE 5/5, LUE 3/5, RLE 4/5, LLE 4/5  Psych:  Calm, cooperative  Lymphatic:       Extrem: B/l legs with dressing in placed, not disturbed   VAD:  R femoral line  4/18    R a-line 4/18    L peripheral IV      Isolation: None    4/21:        4/19:      Sacrum             Sacrum                  Resolving ecchymosis face      R great toe  L lateral foot - unstageable wound    General Labs reviewed:  Recent Labs   Lab 04/22/23  0325 04/23/23  0509 04/24/23  0452   WBC 12.08 10.80 11.38   HGB 7.6* 7.4* 7.2*   HCT 23.1* 22.7* 21.9*    275 306       Recent Labs   Lab 04/22/23  0325 04/23/23  0509 04/24/23  0452    140 142   K 4.4 4.2 3.6   * 115* 117*   CO2 20* 21* 19*   BUN 44* 43* 48*   CREATININE 1.5* 1.6* 1.8*   CALCIUM 8.7 8.5* 8.7   PROT 5.0* 4.5* 4.7*   BILITOT 1.0 1.1* 0.9   ALKPHOS 395* 319* 269*   * 134* 97*   * 113* 60*     Recent Labs   Lab 04/18/23  1328 04/22/23  0325   .4* 12.56*     No results for input(s): SEDRATE in the last 168 hours.    Estimated Creatinine Clearance: 40.7 mL/min (A) (based on SCr of 1.8 mg/dL (H)).       Prior Micro:   Wound cultures left foot 10/18/2022 pansensitive Proteus mirabilis  Wound cultures left foot 08/24/2022 pansensitive Proteus mirabilis, and Porphyromonas somerae    Micro:  Blood cultures x 2 4/18 at [facility] 1/4 bottles GPR, Eggerthella lenta    MRSA SCREEN BY PCR Negative    Blood culture [[id number]] Collected: 04/18/23 1732   Order Status: Completed Specimen: Blood from Antecubital, Right Updated: 04/23/23 2212    Blood Culture, Routine No growth after 5 days.   Blood culture [[id number]] (Abnormal) Collected: 04/18/23 1659   Order Status: Completed Specimen: Blood from Antecubital, Right Arm Updated: 04/23/23 1508    Blood Culture, Routine Gram stain miroslava bottle: Gram positive rods     Results called to and read back by:Dipesh Gotti RN 04/21/2023     04:23     EGGERTHELLA LENTA Abnormal    Rapid Organism ID by PCR (from Blood culture) [[id number]] Collected: 04/18/23 1659   Order Status: Completed Updated: 04/21/23 0609    Enterococcus faecalis Not Detected    Enterococcus faecium Not  Detected    Listeria Monocytogenes Not Detected    Staphylococcus spp. Not Detected    Staphylococcus aureus Not Detected    Staphylococcus epidermidis Not Detected    Staphylococcus lugdunensis Not Detected    Streptococcus species Not Detected    Streptococcus agalactiae Not Detected    Streptococcus pneumoniae Not Detected    Streptococcus pyogenes Not Detected    Acinetobacter calcoaceticus/baumannii complex Not Detected    Bacteroides fragilis Not Detected    Enterobacerales Not Detected    Enterobacter cloacae complex Not Detected    Escherichia Not Detected    Klebsiella aerogenes Not Detected    Klebsiella oxytoca Not Detected    Klebsiella pneumoniae group Not Detected    Proteus Not Detected    Salmonella sp Not Detected    Serratia marcescens Not Detected    Haemophilus influenzae Not Detected    Neisseria meningtidis Not Detected    Pseudomonas aeruginosa Not Detected    Stenotrophomonas maltophilia Not Detected    Candida albicans Not Detected    Candida auris Not Detected    Candida glabrata Not Detected    Candida krusei Not Detected    Candida parapsilosis Not Detected    Candida tropicalis Not Detected    Cryptococcus neoformans/gattii Not Detected    CTX-M (ESBL ) Not Detected    IMP (Carbapenem resistant) Not Detected    KPC resistance gene (Carbapenem resistant) Not Detected    mcr-1  Not Detected    mec A/C  Not Detected    mec A/C and MREJ (MRSA) gene Not Detected    NDM (Carbapenem resistant) Not Detected    OXA-48-like (Carbapenem resistant) Not Detected    van A/B (VRE gene) Not Detected    VIM (Carbapenem resistant) Not Detected       Microbiology Results (last 7 days)       Procedure Component Value Units Date/Time    Blood culture [[id number]] Collected: 04/21/23 1247    Order Status: Completed Specimen: Blood Updated: 04/24/23 1432     Blood Culture, Routine No Growth to date      No Growth to date      No Growth to date      No Growth to date    Narrative:      Collection has  been rescheduled by RE1 at 04/21/2023 12:33 Reason:   Unable to collect  Collection has been rescheduled by RE1 at 04/21/2023 12:33 Reason:   Unable to collect    AFB Culture & Smear [[id number]] Collected: 04/20/23 1901    Order Status: Completed Specimen: Wound from Sacrum Updated: 04/23/23 1618     AFB CULTURE STAIN No acid fast bacilli seen.     AFB CULTURE STAIN Testing performed by:     AFB CULTURE STAIN Lab [facility]     AFB CULTURE STAIN [address] [facility]     [place] [address]     AFB CULTURE STAIN Dr.Brian Gareth MD    Narrative:      Sacral Wound    Culture, Anaerobe [[id number]] Collected: 04/20/23 1901    Order Status: Completed Specimen: Wound from Sacrum Updated: 04/23/23 1254     Anaerobic Culture Culture in progress    Narrative:      Sacral Wound    Culture, Anaerobic [[id number]] Collected: 04/19/23 1239    Order Status: Completed Specimen: Wound from Sacrum Updated: 04/23/23 1055     Anaerobic Culture No anaerobes isolated    Narrative:      Sacrum  (SWAB)    Aerobic culture [[id number]]  (Abnormal)  (Susceptibility) Collected: 04/20/23 1901    Order Status: Completed Specimen: Wound from Sacrum Updated: 04/23/23 0800     Aerobic Bacterial Culture MORGANELLA MORGANII  Few      Narrative:      Sacral Wound    Gram stain [[id number]] Collected: 04/20/23 1901    Order Status: Completed Specimen: Wound from Sacrum Updated: 04/21/23 1130     Gram Stain Result Few WBC's      Few Gram positive cocci      Rare Gram negative rods    Narrative:      Sacral Wound    Aerobic culture [[id number]]  (Abnormal)  (Susceptibility) Collected: 04/19/23 1130    Order Status: Completed Specimen: Wound from Sacrum Updated: 04/21/23 0747     Aerobic Bacterial Culture PROTEUS MIRABILIS  Moderate      Narrative:      Sacrum  (SWAB)    Fungus culture [[id number]] Collected: 04/20/23 1901    Order Status: Sent Specimen: Wound from Sacrum Updated: 04/20/23 1915    MRSA Screen by PCR [[id number]]  Collected: 04/19/23 0940    Order Status: Completed Specimen: Nasopharyngeal Swab from Nasal Updated: 04/19/23 1153     MRSA SCREEN BY PCR Negative           Collected: 04/19/23 1130   Order Status: Completed Specimen: Wound from Sacrum Updated: 04/21/23 0747    Aerobic Bacterial Culture PROTEUS MIRABILIS   Moderate    Abnormal    Narrative:     Sacrum  (SWAB)   Susceptibility     Proteus mirabilis     CULTURE, AEROBIC  (SPECIFY SOURCE)     Amp/Sulbactam <=4/2 mcg/mL Sensitive     Ampicillin <=8 mcg/mL Sensitive     Cefazolin <=2 mcg/mL Sensitive     Cefepime <=2 mcg/mL Sensitive     Ceftriaxone <=1 mcg/mL Sensitive     Ciprofloxacin <=1 mcg/mL Sensitive     Ertapenem <=0.5 mcg/mL Sensitive     Gentamicin <=4 mcg/mL Sensitive     Levofloxacin <=2 mcg/mL Sensitive     Meropenem <=1 mcg/mL Sensitive     Piperacillin/Tazo <=16 mcg/mL Sensitive     Tobramycin <=4 mcg/mL Sensitive     Trimeth/Sulfa <=2/38 mcg/mL Sensitive              Imaging Reviewed:  CXR  CT head   X-ray R foot: No radiographic evidence of osteomyelitis.  LE Arterial US: No clinically significant stenosis, large vessel occlusion or aneurysm in the visualized arteries of the lower extremities.     Cardiology:       IMPRESSION & PLAN     Septic shock resolved in the setting of unstageable foul-smelling sacrococcygeal ulcer s/p I&D by Surgery 4/20   at [facility], high--->12.56 here, trending down   Procal 2.49-->1.39  Repeat blood cultures x 1 no growth to date  Blood cultures x 2 at [facility] 1/4 bottles Eggerthella lenta  Bedside culture 4/19 Pansensitive Proteus mirabilis   OR cultures Morganella morgannii sensitive to Ceftriaxone       2. B/l feet non healing ulcers, likely DTIs from being bed-bound    3. EMMY, cr 2.0, baseline 1.8, trending down    4. Transaminitis, trending down    5. Prior supratherapeutic INR, 9.4, reversed 1.8 4/23     6. PMHx: HTN, DM, CAD s/p MI and cardiac stents, AFib, MTHFR mutation on Coumadin, HLD, gout, ongoing dementia. Recent  fall and cervical fracture 3/2022      Recommendations:  Stop Zosyn IV  Start Rocephin 2g IV daily for Morganella and Proteus  Flagyl 500mg IV q8h for Eggerthella lenta  Above for at least 6 weeks for sacral decub ulcers  Wound care to all affected areas, wound vac to sacrum   OK for PICC line  Plan for colostomy tomorrow 4/25  Aspiration precautions     Accepted at LT[facility]    D/w patient, nursing, Dr Castillo, CM      Medical Decision Making during this encounter was  [_] Low Complexity  [_] Moderate Complexity  [xx] High Complexity

## 2023-04-24 NOTE — PT/OT/SLP EVAL
Physical Therapy Evaluation    Patient Name:  Richard Bustos   MRN:  [id number]    Recommendations:     Discharge Recommendations: LTACH (long-term acute care hospital)   Discharge Equipment Recommendations: to be determined by next level of care   Barriers to discharge: None    Assessment:     Richard Bustos is a 77 y.o. male admitted with a medical diagnosis of Septic shock.  He presents with the following impairments/functional limitations: weakness, impaired endurance, impaired self care skills, impaired functional mobility, impaired cognition, decreased upper extremity function, decreased lower extremity function, decreased safety awareness, pain, impaired skin, edema. Patient is agreeable to participation with PT evaluation. Patient is oriented to person only. His 2 daughters are present and provide history. He had a fall on 3/17/23 requiring cervical surgery and was discharged to SNF where they use a ana for transfers. Prior, he was [place] with cane and driving. Today he requires TotalA for bed mobility with PCT present to assist with hygiene following BM. D/C plan is LTAC per family.     Rehab Prognosis: Fair; patient would benefit from acute skilled PT services to address these deficits and reach maximum level of function.    Recent Surgery: Procedure(s) (LRB):  DEBRIDEMENT, WOUND, SACRUM (N/A) 4 Days Post-Op    Plan:     During this hospitalization, patient to be seen 5 x/week to address the identified rehab impairments via therapeutic activities, therapeutic exercises and progress toward the following goals:    Plan of Care Expires:  05/24/23    Subjective     Chief Complaint: stomach pain   Patient/Family Comments/goals: family agrees with LTAC plan   Pain/Comfort:  Pain Rating 1:  (not rated)  Location 1:  (stomach)  Pain Addressed 1: Reposition, Distraction    Patients cultural, spiritual, Religion conflicts given the current situation:      Living Environment:  Patient admitted from [facility]    Prior to admission, patients level of function was receiving therapy at SNF with family reporting facility uses ana for WC transfer. Prior to fall on 3/17 patient was Joao with cane and driving.  Equipment used at home: lift device, wheelchair.  DME owned (not currently used): none.  Upon discharge, patient will have assistance from LTAC.    Objective:     Communicated with SANDRINE Leal prior to session.  Patient found HOB elevated with bed alarm, mcdaniels catheter, PICC line, telemetry, pressure relief boots, wound vac  upon PT entry to room.    General Precautions: Standard, fall  Orthopedic Precautions:N/A   Braces: N/A  Respiratory Status: Room air    Exams:  RLE Strength: 2-/5  LLE Strength: 2-/5    Functional Mobility:  Bed Mobility:     Rolling Left:  total assistance and of 2 persons  Rolling Right: total assistance and of 2 persons  Scooting: total assistance and of 2 persons      AM-PAC 6 CLICK MOBILITY  Total Score:7       Treatment & Education:  Patient was educated on the importance of OOB activity and functional mobility to negate negative effects of prolonged bed rest during hospitalization, safe transfers, and D/C planning     Patient left HOB elevated with all lines intact, call button in reach, and RN and daughters present.    GOALS:   Multidisciplinary Problems       Physical Therapy Goals          Problem: Physical Therapy    Goal Priority Disciplines Outcome Goal Variances Interventions   Physical Therapy Goal     PT, PT/OT      Description: Goals to be met by: 23     Patient will increase functional independence with mobility by performin. Supine to sit with Maximum Assistance  2. Rolling to Left and Right with Moderate Assistance.  3. Lower extremity exercise program x20 reps per handout, with assistance as needed                         History:     Past Medical History:   Diagnosis Date    Anemia     Anemia of other chronic disease 2017    Anemia, chronic renal failure  09/13/2017    Anemia, unspecified 09/13/2017    Anticoagulant long-term use     Aorta aneurysm     Arthritis     Atrial fibrillation     Bacteremia due to Streptococcus 01/08/2022    CAD (coronary artery disease)     CHF (congestive heart failure)     Chronic kidney disease     Clotting disorder 09/13/2017    Colon polyp     Dementia     Diabetes mellitus     not on meds    Diabetes mellitus type II     Diverticulosis     Elevated PSA     Encounter for blood transfusion     Former smoker     General anesthetics causing adverse effect in therapeutic use     TROUBLE COMING OUT OF ANESTHESIA WITH GASTRIC BYPASS    GERD (gastroesophageal reflux disease)     Gout     Hx of colonic polyps     Hypercoagulable state     Hyperlipidemia     Hypertension     MI, old 2010    MTHFR mutation     Myocardial infarction     9/2010    Osteomyelitis of ankle or foot 03/09/2017    Prostate cancer 06/2016    Sleep apnea     Squamous cell carcinoma 01/23/2018    Left helix, imiquimod    Venous stasis     Chronic       Past Surgical History:   Procedure Laterality Date    ABDOMINAL SURGERY      CARDIAC SURGERY      3 STENTS     CAUDAL EPIDURAL STEROID INJECTION N/A 6/22/2020    Procedure: INJECTION, STEROID, SPINE, EPIDURAL, CAUDAL;  Surgeon: Evangelist Bose MD;  Location: [facility] OR;  Service: Pain Management;  Laterality: N/A;    COLONOSCOPY  02/2011    diverticulosis with diverticula with clot, no active bleeding    COLONOSCOPY N/A 8/24/2016    Procedure: COLONOSCOPY;  Surgeon: Saroj Borjas MD;  Location: [facility];  Service: Endoscopy;  Laterality: N/A;    COLONOSCOPY N/A 11/18/2020    Procedure: COLONOSCOPY;  Surgeon: Saroj Borjas MD;  Location: [facility];  Service: Endoscopy;  Laterality: N/A;    COLONOSCOPY N/A 10/27/2021    Procedure: COLONOSCOPY;  Surgeon: Saroj Borjas MD;  Location: [facility];  Service: Endoscopy;  Laterality: N/A;    DEBRIDEMENT OF SACRAL WOUND N/A 4/20/2023    Procedure: DEBRIDEMENT, WOUND,  SACRUM;  Surgeon: Mark Martinez Jr., MD;  Location: [facility] OR;  Service: General;  Laterality: N/A;    EPIDURAL STEROID INJECTION INTO LUMBAR SPINE N/A 6/22/2020    Procedure: Injection-steroid-epidural-lumbar;  Surgeon: Evangelist Bose MD;  [facility];  Service: Pain Management;  Laterality: N/A;  L3 L4 L5 S1    EPIDURAL STEROID INJECTION INTO LUMBAR SPINE N/A 9/21/2020    Procedure: Injection-steroid-epidural-lumbar;  Surgeon: Evangelist Bose MD;  Location: [facility] OR;  Service: Pain Management;  Laterality: N/A;  L5-S1    FUSION, SPINE, CERVICAL N/A 3/24/2023    Procedure: FUSION, SPINE, CERVICAL;  Surgeon: Kike Kc DO;  Location: [facility] OR;  Service: Neurosurgery;  Laterality: N/A;  posterior cervical decompression/fusion C3-T1    GASTRIC BYPASS  2/5/2008    IVC FILTER RETRIEVAL      JOINT REPLACEMENT  1996 and 2001    bi-lat hip replacement/Rt Hip and Lt Hip    RADIOFREQUENCY ABLATION OF LUMBAR MEDIAL BRANCH NERVE AT SINGLE LEVEL Bilateral 1/10/2020    Procedure: Radiofrequency Ablation, Nerve, Spinal, Lumbar, Medial Branch, 1 Level;  Surgeon: Evangelist Bose MD;  Location: [facility] OR;  Service: Pain Management;  Laterality: Bilateral;  L3,L4,L5    RADIOFREQUENCY ABLATION OF LUMBAR MEDIAL BRANCH NERVE AT SINGLE LEVEL Bilateral 11/23/2021    Procedure: Radiofrequency Ablation, Nerve, Spinal, Lumbar, Medial Branch, Bilateral L 3,4,5;  Surgeon: Nile Causey MD;  [facility];  Service: Pain Management;  Laterality: Bilateral;    ROTATOR CUFF REPAIR      Rt shoulder    Stents  8/18/2010    x 3    UPPER GASTROINTESTINAL ENDOSCOPY  02/2011       Time Tracking:     PT Received On: 04/24/23  PT Start Time: 1025     PT Stop Time: 1107  PT Total Time (min): 42 min     Billable Minutes: Evaluation 10 and Therapeutic Activity 32      04/24/2023

## 2023-04-24 NOTE — PLAN OF CARE
CM s/w patients daughter, Citlali, regarding discharge plan. Citlali would like her father to have the opportunity to go to LTAC for extensive wound care and extended IV antibiotics. Citlali feels like SNF admission instead of LTAC visit caused this readmission. CM sent referral to [facility] and [facility].    1253- Patient accepted at [facility]. CM requested PHN Auth.       04/24/23 0936   Discharge Reassessment   Assessment Type Discharge Planning Reassessment   Did the patient's condition or plan change since previous assessment? Yes   Discharge Plan discussed with: Patient;Adult children   Discharge Plan A Long-term acute care facility (LTAC)   Discharge Plan B Long-term acute care facility (LTAC)   DME Needed Upon Discharge  wound care supplies   Discharge Barriers Identified None   Why the patient remains in the hospital Requires continued medical care   Post-Acute Status   Post-Acute Authorization Placement   Post-Acute Placement Status Referrals Sent   Coverage PHN   Patient choice form signed by patient/caregiver List with quality metrics by geographic area provided

## 2023-04-24 NOTE — PROGRESS NOTES
Pending sale to [facility]  Progress Note    Patient name: Richard Bustos  MRN: [id number]  Admit Date: 4/19/2023   LOS: 5 days     SUBJECTIVE:     Principal problem: Septic shock  Chief Complaint   Patient presents with    Shortness of Breath       Interval History:  No acute events overnight. He is intermittently confused. His daughter is TERESA. We did discuss his code status at bedside and he is clear and has capacity. He would like to remain a full code. Otherwise, infection seems to be improving. Pending further discussion about colostomy      Scheduled Meds:   allopurinoL  100 mg Oral QHS    amiodarone  200 mg Oral BID    aspirin  81 mg Oral Daily    atorvastatin  80 mg Oral Daily    calcitRIOL  0.5 mcg Oral Daily    collagenase   Topical (Top) Daily    enoxparin  1 mg/kg Subcutaneous Q12H    famotidine  20 mg Oral Daily    ferrous sulfate  1 tablet Oral Daily    midodrine  5 mg Oral TID    multivitamin  1 tablet Oral Daily    piperacillin-tazobactam (ZOSYN) IVPB  4.5 g Intravenous Q8H     Continuous Infusions:   sodium chloride 0.9% 75 mL/hr at 04/24/23 0324     PRN Meds:sodium chloride, acetaminophen, calcium gluconate IVPB, calcium gluconate IVPB, calcium gluconate IVPB, dextrose 50%, dextrose 50%, glucagon (human recombinant), glucose, glucose, insulin aspart U-100, LIDOcaine HCl 2%, magnesium sulfate IVPB, magnesium sulfate IVPB, melatonin, ondansetron, polyethylene glycol, potassium chloride **AND** potassium chloride **AND** potassium chloride, sodium phosphate IVPB, sodium phosphate IVPB, sodium phosphate IVPB    Review of patient's allergies indicates:   Allergen Reactions    Donepezil Other (See Comments)     AMS    Bactroban [mupirocin calcium] Blisters     Causes Blisters    Shellfish containing products Other (See Comments)     Other reaction(s): Gout  OYSTERS       Review of Systems: As per interval history    OBJECTIVE:     Vital Signs (Most Recent)  Temp: 97.8 °F (36.6 °C)  (04/24/23 0757)  Pulse: 63 (04/24/23 0757)  Resp: 18 (04/24/23 0757)  BP: (!) 97/53 (04/24/23 0757)  SpO2: 97 % (04/24/23 0757)    Vital Signs Range (Last 24H):  Temp:  [97.6 °F (36.4 °C)-98 °F (36.7 °C)]   Pulse:  [63-83]   Resp:  [17-18]   BP: ()/(53-77)   SpO2:  [94 %-98 %]     I & O (Last 24H):  Intake/Output Summary (Last 24 hours) at 4/24/2023 1112  Last data filed at 4/24/2023 0715  Gross per 24 hour   Intake 960 ml   Output 1500 ml   Net -540 ml       Physical Exam:  General: Patient resting comfortably in no acute distress. Appears as stated age. Calm  Eyes: No conjunctival injection. No scleral icterus.  ENT: Hearing grossly intact. No discharge from ears. No nasal discharge.   Neck: Supple, trachea midline. No JVD  CVS: RRR. No LE edema BL  Lungs: CTA BL, no wheezing or crackles. Good breath sounds. No accessory muscle use. No acute respiratory distress  Abdomen:  Soft, nontender and nondistended.  No organomegaly  Neuro: AOx2. Moves all extremities. Follows commands. Responds appropriately   Skin:  sacral decub, bilateral feet with skin decay    Laboratory:  I have reviewed all pertinent lab results within the past 24 hours.    Diagnostic Results:       ASSESSMENT/PLAN:     Pt is a 78 y/o with the below medical problems, admitted with septic shock due to infected sacral decubitus ulcer    Active Hospital Problems    Diagnosis  POA    *Septic shock [A41.9, R65.21]  Yes    Proteus infection [A49.8]  Unknown    Pressure injury of sacral region, stage 4 [L89.154]  Yes    Supratherapeutic INR [R79.1]  Yes    Anemia [D64.9]  Yes    Multiple skin tears [T14.8XXA]  Yes    Closed nondisplaced fracture of sixth cervical vertebra [S12.501A]  Yes    Dementia without behavioral disturbance [F03.90]  Yes    EMMY (acute kidney injury) [N17.9]  Yes     Chronic    Chronic systolic congestive heart failure [I50.22]  Yes    Peripheral vascular disease [I73.9]  Yes    Paroxysmal atrial fibrillation [I48.0]  Yes     EKG  stable  Stable on Coreg, no missed doses of anticoagulation. Continue anticoagulation as described below        BPH with obstruction/lower urinary tract symptoms [N40.1, N13.8]  Yes    Diabetic neuropathy [E11.40]  Yes    OA (osteoarthritis). post bilateral THR, 2001 [M19.90]  Yes    Long term (current) use of anticoagulants [Z79.01]  Not Applicable     Risk vs benefit of anticoagulation medication discussed at length with patient and daughter.  With his history of MTHFR, a fib and hypercoagulability the benefit preventing of thrombus formation/stent reocclusion is difficult to balance with risk of bleeding.  That said, we will continue anticoagulation and reassess if further episodes of bleeding occur.   Monitor.        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         Plan:   - Continue zosyn per ID recs, Pending finalization of abx plan. He will need to go to LTAC for aggressive wound care, rehab, and antibiotics.   - Will f/u with Dr Martinez regarding question of possible colostomy  - renal function stable  - check urine studies again  - f/u INR  - continue lovenox  - reduce IVF  - nutrition eval  - continue midodrine  - follow culture results  - appreciate wound care > wound vac in place  - diet advanced   - appreciate surgery recommendations  - appreciate ID recommendations  -  continue iron supplementation  - amiodarone for afib.       VTE Risk Mitigation (From admission, onward)           Ordered     enoxaparin injection 100 mg  Every 12 hours (non-standard times)         04/22/23 1030     Place KURTIS hose  Until discontinued         04/19/23 0157     IP VTE HIGH RISK PATIENT  Once         04/19/23 0157                      Department Hospital Medicine  [facility]  Brennen Castillo MD  Date of service: 04/24/2023

## 2023-04-24 NOTE — PROGRESS NOTES
77-year-old male status post debridement of necrotic sacral decubitus wound which extended to the bone.  Due to proximity to anus and concerns about ongoing contamination a diverting ostomy has been requested.  This has been discussed with the patient's daughter who after speak with other family members are in agreement to proceed with laparoscopic versus open diverting ostomy.  I will tentatively plan for this on Tuesday, 04/25/2023. Orders for the patient to be NPO at midnight have been placed. Please hold morning dose of Lovenox.

## 2023-04-25 ENCOUNTER — ANESTHESIA EVENT (OUTPATIENT)
Dept: SURGERY | Facility: HOSPITAL | Age: 78
DRG: 853 | End: 2023-04-25
Payer: MEDICARE

## 2023-04-25 ENCOUNTER — ANESTHESIA (OUTPATIENT)
Dept: SURGERY | Facility: HOSPITAL | Age: 78
DRG: 853 | End: 2023-04-25
Payer: MEDICARE

## 2023-04-25 LAB
ABO + RH BLD: NORMAL
ALBUMIN SERPL BCP-MCNC: 1.9 G/DL (ref 3.5–5.2)
ALP SERPL-CCNC: 254 U/L (ref 55–135)
ALT SERPL W/O P-5'-P-CCNC: 84 U/L (ref 10–44)
ANION GAP SERPL CALC-SCNC: 8 MMOL/L (ref 8–16)
AST SERPL-CCNC: 54 U/L (ref 10–40)
BASOPHILS # BLD AUTO: 0.03 K/UL (ref 0–0.2)
BASOPHILS NFR BLD: 0.3 % (ref 0–1.9)
BILIRUB SERPL-MCNC: 0.7 MG/DL (ref 0.1–1)
BLD GP AB SCN CELLS X3 SERPL QL: NORMAL
BUN SERPL-MCNC: 45 MG/DL (ref 8–23)
CALCIUM SERPL-MCNC: 9.1 MG/DL (ref 8.7–10.5)
CHLORIDE SERPL-SCNC: 116 MMOL/L (ref 95–110)
CO2 SERPL-SCNC: 18 MMOL/L (ref 23–29)
CREAT SERPL-MCNC: 1.8 MG/DL (ref 0.5–1.4)
DIFFERENTIAL METHOD: ABNORMAL
EOSINOPHIL # BLD AUTO: 0.2 K/UL (ref 0–0.5)
EOSINOPHIL NFR BLD: 1.7 % (ref 0–8)
ERYTHROCYTE [DISTWIDTH] IN BLOOD BY AUTOMATED COUNT: 14.9 % (ref 11.5–14.5)
EST. GFR  (NO RACE VARIABLE): 38.8 ML/MIN/1.73 M^2
GLUCOSE SERPL-MCNC: 79 MG/DL (ref 70–110)
GLUCOSE SERPL-MCNC: 81 MG/DL (ref 70–110)
GLUCOSE SERPL-MCNC: 86 MG/DL (ref 70–110)
GLUCOSE SERPL-MCNC: 90 MG/DL (ref 70–110)
GLUCOSE SERPL-MCNC: 93 MG/DL (ref 70–110)
HCT VFR BLD AUTO: 24 % (ref 40–54)
HGB BLD-MCNC: 8 G/DL (ref 14–18)
IMM GRANULOCYTES # BLD AUTO: 0.16 K/UL (ref 0–0.04)
IMM GRANULOCYTES NFR BLD AUTO: 1.4 % (ref 0–0.5)
INR PPP: 2 (ref 0.8–1.2)
LYMPHOCYTES # BLD AUTO: 0.8 K/UL (ref 1–4.8)
LYMPHOCYTES NFR BLD: 7.2 % (ref 18–48)
MAGNESIUM SERPL-MCNC: 1.9 MG/DL (ref 1.6–2.6)
MCH RBC QN AUTO: 34 PG (ref 27–31)
MCHC RBC AUTO-ENTMCNC: 33.3 G/DL (ref 32–36)
MCV RBC AUTO: 102 FL (ref 82–98)
MONOCYTES # BLD AUTO: 0.3 K/UL (ref 0.3–1)
MONOCYTES NFR BLD: 3 % (ref 4–15)
NEUTROPHILS # BLD AUTO: 9.8 K/UL (ref 1.8–7.7)
NEUTROPHILS NFR BLD: 86.4 % (ref 38–73)
NRBC BLD-RTO: 0 /100 WBC
PHOSPHATE SERPL-MCNC: 3.8 MG/DL (ref 2.7–4.5)
PLATELET # BLD AUTO: 352 K/UL (ref 150–450)
PMV BLD AUTO: 10.3 FL (ref 9.2–12.9)
POTASSIUM SERPL-SCNC: 3.7 MMOL/L (ref 3.5–5.1)
PROT SERPL-MCNC: 5.4 G/DL (ref 6–8.4)
PROTHROMBIN TIME: 20.5 SEC (ref 9–12.5)
RBC # BLD AUTO: 2.35 M/UL (ref 4.6–6.2)
SODIUM SERPL-SCNC: 142 MMOL/L (ref 136–145)
SPECIMEN OUTDATE: NORMAL
WBC # BLD AUTO: 11.37 K/UL (ref 3.9–12.7)

## 2023-04-25 PROCEDURE — 71000033 HC RECOVERY, INTIAL HOUR: Performed by: SURGERY

## 2023-04-25 PROCEDURE — 86920 COMPATIBILITY TEST SPIN: CPT | Performed by: INTERNAL MEDICINE

## 2023-04-25 PROCEDURE — 25000003 PHARM REV CODE 250: Performed by: NURSE ANESTHETIST, CERTIFIED REGISTERED

## 2023-04-25 PROCEDURE — 71000039 HC RECOVERY, EACH ADD'L HOUR: Performed by: SURGERY

## 2023-04-25 PROCEDURE — D9220A PRA ANESTHESIA: Mod: ANES,,, | Performed by: ANESTHESIOLOGY

## 2023-04-25 PROCEDURE — 12000002 HC ACUTE/MED SURGE SEMI-PRIVATE ROOM

## 2023-04-25 PROCEDURE — 99233 SBSQ HOSP IP/OBS HIGH 50: CPT | Mod: ,,, | Performed by: STUDENT IN AN ORGANIZED HEALTH CARE EDUCATION/TRAINING PROGRAM

## 2023-04-25 PROCEDURE — 36620 ARTERIAL: ICD-10-PCS | Mod: 59,,, | Performed by: ANESTHESIOLOGY

## 2023-04-25 PROCEDURE — 99233 SBSQ HOSP IP/OBS HIGH 50: CPT | Mod: ,,, | Performed by: FAMILY MEDICINE

## 2023-04-25 PROCEDURE — 25000003 PHARM REV CODE 250: Performed by: SURGERY

## 2023-04-25 PROCEDURE — 25000003 PHARM REV CODE 250: Performed by: STUDENT IN AN ORGANIZED HEALTH CARE EDUCATION/TRAINING PROGRAM

## 2023-04-25 PROCEDURE — 37000009 HC ANESTHESIA EA ADD 15 MINS: Performed by: SURGERY

## 2023-04-25 PROCEDURE — 27201423 OPTIME MED/SURG SUP & DEVICES STERILE SUPPLY: Performed by: SURGERY

## 2023-04-25 PROCEDURE — P9045 ALBUMIN (HUMAN), 5%, 250 ML: HCPCS | Mod: JZ,JG | Performed by: NURSE ANESTHETIST, CERTIFIED REGISTERED

## 2023-04-25 PROCEDURE — 36000711: Performed by: SURGERY

## 2023-04-25 PROCEDURE — 36000710: Performed by: SURGERY

## 2023-04-25 PROCEDURE — 44188 PR LAP, SURG COLOSTOMY: ICD-10-PCS | Mod: ,,, | Performed by: SURGERY

## 2023-04-25 PROCEDURE — 63600175 PHARM REV CODE 636 W HCPCS: Mod: JZ,JG | Performed by: NURSE ANESTHETIST, CERTIFIED REGISTERED

## 2023-04-25 PROCEDURE — 86900 BLOOD TYPING SEROLOGIC ABO: CPT | Performed by: SURGERY

## 2023-04-25 PROCEDURE — 82962 GLUCOSE BLOOD TEST: CPT | Performed by: SURGERY

## 2023-04-25 PROCEDURE — 44188 LAP COLOSTOMY: CPT | Mod: ,,, | Performed by: SURGERY

## 2023-04-25 PROCEDURE — S0030 INJECTION, METRONIDAZOLE: HCPCS | Performed by: STUDENT IN AN ORGANIZED HEALTH CARE EDUCATION/TRAINING PROGRAM

## 2023-04-25 PROCEDURE — 99233 PR SUBSEQUENT HOSPITAL CARE,LEVL III: ICD-10-PCS | Mod: ,,, | Performed by: STUDENT IN AN ORGANIZED HEALTH CARE EDUCATION/TRAINING PROGRAM

## 2023-04-25 PROCEDURE — 84100 ASSAY OF PHOSPHORUS: CPT | Performed by: STUDENT IN AN ORGANIZED HEALTH CARE EDUCATION/TRAINING PROGRAM

## 2023-04-25 PROCEDURE — 63600175 PHARM REV CODE 636 W HCPCS: Performed by: STUDENT IN AN ORGANIZED HEALTH CARE EDUCATION/TRAINING PROGRAM

## 2023-04-25 PROCEDURE — 83735 ASSAY OF MAGNESIUM: CPT | Performed by: STUDENT IN AN ORGANIZED HEALTH CARE EDUCATION/TRAINING PROGRAM

## 2023-04-25 PROCEDURE — 85025 COMPLETE CBC W/AUTO DIFF WBC: CPT | Performed by: STUDENT IN AN ORGANIZED HEALTH CARE EDUCATION/TRAINING PROGRAM

## 2023-04-25 PROCEDURE — 36620 INSERTION CATHETER ARTERY: CPT | Mod: 59,,, | Performed by: ANESTHESIOLOGY

## 2023-04-25 PROCEDURE — 99233 PR SUBSEQUENT HOSPITAL CARE,LEVL III: ICD-10-PCS | Mod: ,,, | Performed by: FAMILY MEDICINE

## 2023-04-25 PROCEDURE — 80053 COMPREHEN METABOLIC PANEL: CPT | Performed by: STUDENT IN AN ORGANIZED HEALTH CARE EDUCATION/TRAINING PROGRAM

## 2023-04-25 PROCEDURE — 63600175 PHARM REV CODE 636 W HCPCS: Performed by: NURSE ANESTHETIST, CERTIFIED REGISTERED

## 2023-04-25 PROCEDURE — 85610 PROTHROMBIN TIME: CPT | Performed by: STUDENT IN AN ORGANIZED HEALTH CARE EDUCATION/TRAINING PROGRAM

## 2023-04-25 PROCEDURE — D9220A PRA ANESTHESIA: Mod: CRNA,,, | Performed by: NURSE ANESTHETIST, CERTIFIED REGISTERED

## 2023-04-25 PROCEDURE — D9220A PRA ANESTHESIA: ICD-10-PCS | Mod: CRNA,,, | Performed by: NURSE ANESTHETIST, CERTIFIED REGISTERED

## 2023-04-25 PROCEDURE — 37000008 HC ANESTHESIA 1ST 15 MINUTES: Performed by: SURGERY

## 2023-04-25 PROCEDURE — D9220A PRA ANESTHESIA: ICD-10-PCS | Mod: ANES,,, | Performed by: ANESTHESIOLOGY

## 2023-04-25 PROCEDURE — S0030 INJECTION, METRONIDAZOLE: HCPCS | Performed by: SURGERY

## 2023-04-25 RX ORDER — ONDANSETRON 2 MG/ML
4 INJECTION INTRAMUSCULAR; INTRAVENOUS DAILY PRN
Status: DISCONTINUED | OUTPATIENT
Start: 2023-04-25 | End: 2023-04-25 | Stop reason: HOSPADM

## 2023-04-25 RX ORDER — EPHEDRINE SULFATE 50 MG/ML
INJECTION, SOLUTION INTRAVENOUS
Status: DISCONTINUED | OUTPATIENT
Start: 2023-04-25 | End: 2023-04-25

## 2023-04-25 RX ORDER — FENTANYL CITRATE 50 UG/ML
INJECTION, SOLUTION INTRAMUSCULAR; INTRAVENOUS
Status: DISCONTINUED | OUTPATIENT
Start: 2023-04-25 | End: 2023-04-25

## 2023-04-25 RX ORDER — DIPHENHYDRAMINE HYDROCHLORIDE 50 MG/ML
12.5 INJECTION INTRAMUSCULAR; INTRAVENOUS ONCE AS NEEDED
Status: DISCONTINUED | OUTPATIENT
Start: 2023-04-25 | End: 2023-04-25 | Stop reason: HOSPADM

## 2023-04-25 RX ORDER — VASOPRESSIN 20 [USP'U]/ML
INJECTION, SOLUTION INTRAMUSCULAR; SUBCUTANEOUS
Status: DISCONTINUED | OUTPATIENT
Start: 2023-04-25 | End: 2023-04-25

## 2023-04-25 RX ORDER — FENTANYL CITRATE 50 UG/ML
25 INJECTION, SOLUTION INTRAMUSCULAR; INTRAVENOUS EVERY 5 MIN PRN
Status: DISCONTINUED | OUTPATIENT
Start: 2023-04-25 | End: 2023-04-25 | Stop reason: HOSPADM

## 2023-04-25 RX ORDER — LIDOCAINE HYDROCHLORIDE 20 MG/ML
INJECTION, SOLUTION EPIDURAL; INFILTRATION; INTRACAUDAL; PERINEURAL
Status: DISCONTINUED | OUTPATIENT
Start: 2023-04-25 | End: 2023-04-25

## 2023-04-25 RX ORDER — HYDROCODONE BITARTRATE AND ACETAMINOPHEN 5; 325 MG/1; MG/1
1 TABLET ORAL EVERY 6 HOURS PRN
Status: DISCONTINUED | OUTPATIENT
Start: 2023-04-25 | End: 2023-04-28 | Stop reason: HOSPADM

## 2023-04-25 RX ORDER — PROPOFOL 10 MG/ML
VIAL (ML) INTRAVENOUS
Status: DISCONTINUED | OUTPATIENT
Start: 2023-04-25 | End: 2023-04-25

## 2023-04-25 RX ORDER — ALBUMIN HUMAN 50 G/1000ML
SOLUTION INTRAVENOUS CONTINUOUS PRN
Status: DISCONTINUED | OUTPATIENT
Start: 2023-04-25 | End: 2023-04-25

## 2023-04-25 RX ORDER — SODIUM CHLORIDE 0.9 G/100ML
IRRIGANT IRRIGATION
Status: DISCONTINUED | OUTPATIENT
Start: 2023-04-25 | End: 2023-04-25 | Stop reason: HOSPADM

## 2023-04-25 RX ORDER — ROCURONIUM BROMIDE 10 MG/ML
INJECTION, SOLUTION INTRAVENOUS
Status: DISCONTINUED | OUTPATIENT
Start: 2023-04-25 | End: 2023-04-25

## 2023-04-25 RX ORDER — BUPIVACAINE HYDROCHLORIDE AND EPINEPHRINE 2.5; 5 MG/ML; UG/ML
INJECTION, SOLUTION EPIDURAL; INFILTRATION; INTRACAUDAL; PERINEURAL
Status: DISCONTINUED | OUTPATIENT
Start: 2023-04-25 | End: 2023-04-25 | Stop reason: HOSPADM

## 2023-04-25 RX ADMIN — VASOPRESSIN 2 UNITS: 20 INJECTION INTRAVENOUS at 01:04

## 2023-04-25 RX ADMIN — PROPOFOL 70 MG: 10 INJECTION, EMULSION INTRAVENOUS at 01:04

## 2023-04-25 RX ADMIN — METRONIDAZOLE 500 MG: 5 INJECTION, SOLUTION INTRAVENOUS at 08:04

## 2023-04-25 RX ADMIN — ALLOPURINOL 100 MG: 100 TABLET ORAL at 08:04

## 2023-04-25 RX ADMIN — MIDODRINE HYDROCHLORIDE 5 MG: 2.5 TABLET ORAL at 06:04

## 2023-04-25 RX ADMIN — SUGAMMADEX 200 MG: 100 INJECTION, SOLUTION INTRAVENOUS at 02:04

## 2023-04-25 RX ADMIN — SODIUM CHLORIDE: 0.9 INJECTION, SOLUTION INTRAVENOUS at 08:04

## 2023-04-25 RX ADMIN — METRONIDAZOLE 500 MG: 5 INJECTION, SOLUTION INTRAVENOUS at 07:04

## 2023-04-25 RX ADMIN — LIDOCAINE HYDROCHLORIDE 50 MG: 20 INJECTION, SOLUTION INTRAVENOUS at 01:04

## 2023-04-25 RX ADMIN — SODIUM CHLORIDE, SODIUM LACTATE, POTASSIUM CHLORIDE, AND CALCIUM CHLORIDE: .6; .31; .03; .02 INJECTION, SOLUTION INTRAVENOUS at 01:04

## 2023-04-25 RX ADMIN — CEFTRIAXONE SODIUM 2 G: 2 INJECTION, POWDER, FOR SOLUTION INTRAMUSCULAR; INTRAVENOUS at 06:04

## 2023-04-25 RX ADMIN — MIDODRINE HYDROCHLORIDE 5 MG: 2.5 TABLET ORAL at 11:04

## 2023-04-25 RX ADMIN — ALBUMIN (HUMAN): 12.5 INJECTION, SOLUTION INTRAVENOUS at 02:04

## 2023-04-25 RX ADMIN — FENTANYL CITRATE 50 MCG: 50 INJECTION INTRAMUSCULAR; INTRAVENOUS at 01:04

## 2023-04-25 RX ADMIN — SODIUM CHLORIDE, SODIUM LACTATE, POTASSIUM CHLORIDE, AND CALCIUM CHLORIDE: .6; .31; .03; .02 INJECTION, SOLUTION INTRAVENOUS at 02:04

## 2023-04-25 RX ADMIN — AMIODARONE HYDROCHLORIDE 200 MG: 200 TABLET ORAL at 08:04

## 2023-04-25 RX ADMIN — EPHEDRINE SULFATE 10 MG: 50 INJECTION INTRAVENOUS at 01:04

## 2023-04-25 RX ADMIN — ROCURONIUM BROMIDE 50 MG: 10 INJECTION, SOLUTION INTRAVENOUS at 01:04

## 2023-04-25 RX ADMIN — METRONIDAZOLE 500 MG: 5 INJECTION, SOLUTION INTRAVENOUS at 01:04

## 2023-04-25 RX ADMIN — ATORVASTATIN CALCIUM 80 MG: 40 TABLET, FILM COATED ORAL at 08:04

## 2023-04-25 NOTE — ANESTHESIA PROCEDURE NOTES
Intubation    Date/Time: 4/25/2023 1:21 PM  Performed by: Parviz Christine CRNA  Authorized by: Scott Wilson MD     Intubation:     Induction:  Intravenous    Intubated:  Postinduction    Mask Ventilation:  Easy mask    Attempts:  1    Attempted By:  CRNA    Method of Intubation:  Video laryngoscopy    Blade:  Dexter 3    Laryngeal View Grade: Grade I - full view of cords      Difficult Airway Encountered?: No      Complications:  None    Airway Device:  Oral endotracheal tube    Airway Device Size:  7.5    Style/Cuff Inflation:  Cuffed    Inflation Amount (mL):  6    Tube secured:  22    Placement Verified By:  Capnometry    Complicating Factors:  None    Findings Post-Intubation:  BS equal bilateral

## 2023-04-25 NOTE — PLAN OF CARE
CM received notice of [facility] offering peer to peer prior to LTAC decision. CM messaged Dr Castillo to inquire if he would like to schedule peer to peer for LTAC.

## 2023-04-25 NOTE — PLAN OF CARE
Nursing Transfer Note      4/25/2023     Reason patient is being transferred: Post-op    Transfer To: 1206    Transfer via bed    Transported by Susie DELUCA     Medicines sent: N/A    Any special needs or follow-up needed: Colostomy care    Chart send with patient: Yes    Notified: Meng CORONADO     Patient reassessed at: 4/25, 1756     Upon arrival to floor: patient oriented to room, call bell in reach, and bed in lowest position

## 2023-04-25 NOTE — ANESTHESIA PROCEDURE NOTES
Arterial    Diagnosis: hypotension    Patient location during procedure: done in OR  Procedure start time: 4/25/2023 1:31 PM  Timeout: 4/25/2023 1:31 PM  Procedure end time: 4/25/2023 1:42 PM    Staffing  Authorizing Provider: Scott Wilson MD  Performing Provider: Scott Wilson MD    Anesthesiologist was present at the time of the procedure.    Preanesthetic Checklist  Completed: patient identified, risks and benefits discussed, monitors and equipment checked, timeout performed and anesthesia consent givenArterial  Skin Prep: chlorhexidine gluconate  Local Infiltration: lidocaine  Orientation: right  Location: radial    Catheter Size: 20 G  Catheter placement by Ultrasound guidance. Heme positive aspiration all ports.   Vessel Caliber: medium, patent, compressibility normal  Vascular Doppler:  not done  Needle advanced into vessel with real time Ultrasound guidance.  Sterile sheath used.Insertion Attempts: 1  Assessment  Dressing: sutured in place and taped and tegaderm  Patient: Tolerated well

## 2023-04-25 NOTE — CONSULTS
Sacral wound vac dressing no holding suction, patient had large BM.  Removed dressing Dr. Eddy here.  Instructed to pack sacral wound with saline moist gauze and cover.  Wound with 30% brown/tan slough, bone palpable.  Wound edges red raw broke down denuded.  Scrotum denuded, francine area red denuded.  Left arm is swollen and weeping clear fluid from hand.  IV site is oozing bloody drainage.  Nurse aware wrapped site.  Small skin tear left arm dressing cleaned and dried and covered with adaptic, wrapped with kerlix. Dresssing to bilateral legs intact.  Surgery tech here to bring patient to OR.  Will leave wound vac off until tomorrow, Dr. Eddy to see again in am.

## 2023-04-25 NOTE — PT/OT/SLP PROGRESS
Physical Therapy      Patient Name:  Richard Bustos   MRN:  [id number]    Patient not seen today secondary to Off the floor for procedure/surgery. Will follow-up 4/25/23.

## 2023-04-25 NOTE — ANESTHESIA POSTPROCEDURE EVALUATION
Anesthesia Post Evaluation    Patient: Richard Bustos    Procedure(s) Performed: Procedure(s) (LRB):  CREATION, COLOSTOMY, LAPAROSCOPIC (N/A)    Final Anesthesia Type: general      Patient location during evaluation: PACU  Patient participation: Yes- Able to Participate  Level of consciousness: awake and alert, oriented and awake  Post-procedure vital signs: reviewed and stable  Pain management: adequate  Airway patency: patent    PONV status at discharge: No PONV  Anesthetic complications: no      Cardiovascular status: blood pressure returned to baseline, hemodynamically stable and stable  Respiratory status: unassisted, spontaneous ventilation and room air  Hydration status: euvolemic  Follow-up not needed.          Vitals Value Taken Time   /53 04/25/23 1703   Temp 36.2 °C (97.2 °F) 04/25/23 1700   Pulse 59 04/25/23 1708   Resp 53 04/25/23 1708   SpO2 95 % 04/25/23 1708   Vitals shown include unvalidated device data.      No case tracking events are documented in the log.      Pain/Chrsi Score: Chris Score: 9 (4/25/2023  5:00 PM)

## 2023-04-25 NOTE — PT/OT/SLP PROGRESS
Occupational Therapy      Patient Name:  Richard Bustos   MRN:  [id number]    Patient not seen today secondary to Off the floor for procedure/surgery. Will follow-up 4/26/2023.    4/25/2023

## 2023-04-25 NOTE — OP NOTE
DATE OF PROCEDURE: 04/25/2023    PREOPERATIVE DIAGNOSIS:   Stage IV sacral pressure wound  Debilitation with bed-bound status    POSTOPERATIVE DIAGNOSIS: Same    PROCEDURE: Laparoscopic diverting loop colostomy    SURGEON: Mark Martinez M.D    ASSISTANT: None    ANESTHESIA: General    ESTIMATED BLOOD LOSS: 30 cc    SPECIMEN: No    CONDITION: Stable    COMPLICATIONS: None    FINDINGS:   1. Laparoscopic identification of the sigmoid colon which was very redundant  2. Brought up through left upper abdominal ostomy site, mucosa somewhat dusky but viable at the end of the case     INDICATIONS: The patient is a 77-year-old male with severe debilitation and a stage IV pressure wound that is very near the anus.  Due to issues with wound care and patient is bed-bound status we felt diverting ostomy would be appropriate. This was discussed with the patient's daughter who provided informed consent.    PROCEDURE IN DETAIL: Patient taken operating room where he was intubated on his bed and then transferred to the operating table.  An a line was placed by anesthesia.  He was on scheduled antibiotics.  His abdomen was prepped draped typical sterile fashion.  Time-out performed by members of the operative team. A Veress needle was inserted at olmedo's point attached insufflation.  We had appropriate initial pressures and pneumoperitoneum was achieved.  Local anesthetic was injected and a stab incision was made in the mid lateral right abdomen.  A 5 mm Optiview trocar was inserted and our Veress site was without injury. We immediately noticed a very redundant sigmoid colon.  Additional 5 mm was placed in the lower right lateral abdomen. Were able to easily mobilize the sigmoid colon up to a planned ostomy site which we would previously been marked in the left mid abdomen. This site was excised at the skin and subcutaneous level down the fascia. We then placed a 12 mm trocar and able to grasp our sigmoid colon with a grasper and  then pull it up through the wound. We desufflated pneumoperitoneum and created a cruciate incision through the anterior and posterior rectus fascia and spread the rectus muscle which allowed us to deliver the loop of sigmoid colon. Window was created in the mesentery and 18 Palauan red rubber catheter was passed to help secure the loop. We then removed our trocars and closed the incisions with 4-0 Monocryl subcuticular stitches and Dermabond was applied.  We then turned our attention to creation of the ostomy.  The bowel was transected proximally 2/3 of its circumference. We then performed maturation of the ostomy with interrupted 3-0 Vicryl sutures. There was a hematoma that developed in the mucosa of the upper aspect. This was taken down and evacuated.  The edge of the cup mucosa was oversewn. We then we matured the ostomy in this location.  The mucosa in this area was somewhat purplish appearing but appeared viable.  The lumens were widely patent without any significant impingement. An ostomy device was then applied.  Patient was then aroused from sedation, extubated taken to recovery room stable condition have suffered no complications.  All counts were correct x2 at the end the case. I was present scrubbed throughout all operative portions of the case.    DISPO: Return to floor

## 2023-04-25 NOTE — TRANSFER OF CARE
"Anesthesia Transfer of Care Note    Patient: Richard Bustos    Procedure(s) Performed: Procedure(s) (LRB):  CREATION, COLOSTOMY, LAPAROSCOPIC (N/A)    Patient location: PACU    Anesthesia Type: general    Transport from OR: Transported from OR on room air with adequate spontaneous ventilation    Post pain: adequate analgesia    Post assessment: no apparent anesthetic complications    Post vital signs: stable    Level of consciousness: awake    Nausea/Vomiting: no nausea/vomiting    Complications: none    Transfer of care protocol was followedComments: Patient stable, report to RN, questions answered.  I am available during the recovery time for any further needs       Last vitals:   Visit Vitals  BP (!) 102/53   Pulse 63   Temp 36.4 °C (97.6 °F) (Oral)   Resp 18   Ht 5' 8" (1.727 m)   Wt 100.4 kg (221 lb 5.5 oz)   SpO2 98%   BMI 33.65 kg/m²     "

## 2023-04-25 NOTE — PROGRESS NOTES
Progress Note  Infectious Disease    Reason for Consult:  Septic shock     HPI: Richard Bustos is a 77 y.o. male obese, BMI 33/6 Kg/m2, with past medical history of HTN, DM, CAD s/p MI and cardiac stents, AFib, MTHFR mutation on Coumadin, HLD, gout, ongoing dementia and prior fall on 3/17/23 which resulted in head trauma, and cervical fracture with cord edema status post cervical spine fusion on 03/24/2023 by Neurosurgery.  Patient was discharged to [facility] nursing [facility], [place] to continue recovery.  Unfortunately, since he has been bed-bound since his fall, he has developed unstageable sacrococcygeal wound.    Daughters at bedside providing most of the history, patient was found altered and hypotensive at facility yesterday, sent to [facility] ER for further workup.    Patient does not remember what happened yesterday, he is aware he is at the hospital in ICU, denies any fever or chills, no headache, no nausea or vomiting, no cough, no shortness of breath, no abdominal pain, no dysuria or increased urinary frequency, no changes in the color of the urine, reports having diarrhea a few days ago, had a tiny bowel movement earlier today.    At [facility], despite IV fluids, patient required Levophed, transferred to [facility] for ICU care.      Labs on admission with leukocytosis of 13, left shift 84%, bands 4%, H&H 9.1/27.4, , platelet count 306   INR 7.8--9.4, very high   EMMY, creatinine 2.5 (baseline 1.2)  .4 at [facility], high   Hemoglobin A1c 5.4   U tox negative   UA negative for UTI   Chest x-ray with hypoventilation, mild cardiomegaly.  No acute infiltrates.  CT head negative for acute pathology     Empirically started on vancomycin and cefepime IV.      ID consult for septic shock.    Upon chart reviewed, patient has history of non healing ulcer on L 2nd toe, completed 6 weeks of oral antibiotics with Augmentin for pan-sensitive Porteus mirabilis for possible osteomyelitis.    4/20:  Interim reviewed,  patient seen examined at bedside, awake, alert, pressors decreased to 0.1, plan for OR later today for sacral decubitus ulcer debridement.  Patient is grateful for the care he has been given at the hospital.  Complaining of bilateral lower extremity pain.  Afebrile.  Labs reviewed, leukocytosis 15.5, left shift 90.8%, no bands, H&H 7.2/20.9, platelet count 275.  INR reversed, 1.3, creatinine 2, trending down, transaminitis /.  Seen by Wound Care yesterday, status post debridement at bedside, wound cultures with GNR, non lactose , pending final.  Micro at [place], blood cultures x2 from 04/18, no growth to date, pending final.    4/21:  Interim reviewed, patient status post I&D by surgery yesterday, as per op-note:  Sacral wound was 5 x 7 cm and was found to have significant amount of underlying necrosis which extended through the subcutaneous fat and muscle layers down to the level of the overlying fascia from the sacrum.  Final wound measured 10 x 12 x 6 cm.  He is still in ICU, recovering, on minimal dose pressors Levophed 0.03, awake, alert, states he is feeling good, about to have breakfast.  Labs reviewed, white count 11.7, left shift 91.1%, improving, H&H 7/20.7, MCV 99, platelet count 262.  INR 1.3.  Kidney function trending down creatinine 1.7, elevated LFTs trending down to 15/149.  Micro reviewed, wound cultures from bedside debridement pansensitive Proteus mirabilis.  OR cultures Gram stain with few WBC, few Gram-positive cocci, rare Gram-negative rods, pending final.     4/22(Ethan) interim reviewed. GPR from blood cultures 4/18 still not identified([facility]). GNR from surgical specimen(soft tissue). Wound vac now in place. Post debridement photos had 80% slough. Remains on and off levophed. He is alert an dcooperative and has no complaints.    4/24 (Amarjit): Interim reviewed, discussed with Dr Long. Patient seen and examined at bedside, he seems confused, discussed with  Hospitalist, seems like he has been having some delirium for the last couple of days, he keeps repeating they are picking up tomorrow at 7am and he will be back a 8am. He is going for colostomy tomorrow by Surgery, accepted at [facility]. Hemodynamically stale, afebrile. Micro reviewed, no new data.    4/25: Interim reviewed, patient s/p colostomy today, seen in PACU, recovering from anesthesia, a little confused. Colostomy bad in place, leaking a little, dressing in place. L arm edema noted, pending US, will remove midline.     Antibiotics (From admission, onward)      Start     Stop Route Frequency Ordered    04/24/23 1730  metronidazole IVPB 500 mg         -- IV Every 8 hours (non-standard times) 04/24/23 1603    04/24/23 1700  cefTRIAXone (ROCEPHIN) 2 g in dextrose 5 % 100 mL IVPB (ready to mix)         -- IV Every 24 hours (non-standard times) 04/24/23 1603              Review of patient's allergies indicates:   Allergen Reactions    Donepezil Other (See Comments)     AMS    Bactroban [mupirocin calcium] Blisters     Causes Blisters    Shellfish containing products Other (See Comments)     Other reaction(s): Gout  OYSTERS     Past Medical History:   Diagnosis Date    Anemia     Anemia of other chronic disease 09/13/2017    Anemia, chronic renal failure 09/13/2017    Anemia, unspecified 09/13/2017    Anticoagulant long-term use     Aorta aneurysm     Arthritis     Atrial fibrillation     Bacteremia due to Streptococcus 01/08/2022    CAD (coronary artery disease)     CHF (congestive heart failure)     Chronic kidney disease     Clotting disorder 09/13/2017    Colon polyp     Dementia     Diabetes mellitus     not on meds    Diabetes mellitus type II     Diverticulosis     Elevated PSA     Encounter for blood transfusion     Former smoker     General anesthetics causing adverse effect in therapeutic use     TROUBLE COMING OUT OF ANESTHESIA WITH GASTRIC BYPASS    GERD (gastroesophageal reflux  disease)     Gout     Hx of colonic polyps     Hypercoagulable state     Hyperlipidemia     Hypertension     MI, old 2010    MTHFR mutation     Myocardial infarction     9/2010    Osteomyelitis of ankle or foot 03/09/2017    Prostate cancer 06/2016    Sleep apnea     Squamous cell carcinoma 01/23/2018    Left helix, imiquimod    Venous stasis     Chronic     Past Surgical History:   Procedure Laterality Date    ABDOMINAL SURGERY      CARDIAC SURGERY      3 STENTS     CAUDAL EPIDURAL STEROID INJECTION N/A 6/22/2020    Procedure: INJECTION, STEROID, SPINE, EPIDURAL, CAUDAL;  Surgeon: Evangelist Bose MD;  Location: [facility] OR;  Service: Pain Management;  Laterality: N/A;    COLONOSCOPY  02/2011    diverticulosis with diverticula with clot, no active bleeding    COLONOSCOPY N/A 8/24/2016    Procedure: COLONOSCOPY;  Surgeon: Saroj Borjsa MD;  Location: [facility] ENDO;  Service: Endoscopy;  Laterality: N/A;    COLONOSCOPY N/A 11/18/2020    Procedure: COLONOSCOPY;  Surgeon: Saroj Borjas MD;  Location: [facility] ENDO;  Service: Endoscopy;  Laterality: N/A;    COLONOSCOPY N/A 10/27/2021    Procedure: COLONOSCOPY;  Surgeon: Saroj Borjas MD;  Location: [facility] ENDO;  Service: Endoscopy;  Laterality: N/A;    DEBRIDEMENT OF SACRAL WOUND N/A 4/20/2023    Procedure: DEBRIDEMENT, WOUND, SACRUM;  Surgeon: Mark Martinez Jr., MD;  Location: [facility] OR;  Service: General;  Laterality: N/A;    EPIDURAL STEROID INJECTION INTO LUMBAR SPINE N/A 6/22/2020    Procedure: Injection-steroid-epidural-lumbar;  Surgeon: Evangelist Bose MD;  Location: [facility] OR;  Service: Pain Management;  Laterality: N/A;  L3 L4 L5 S1    EPIDURAL STEROID INJECTION INTO LUMBAR SPINE N/A 9/21/2020    Procedure: Injection-steroid-epidural-lumbar;  Surgeon: Evangelist Bose MD;  Location: [facility] OR;  Service: Pain Management;  Laterality: N/A;  L5-S1    FUSION, SPINE, CERVICAL N/A 3/24/2023    Procedure: FUSION, SPINE, CERVICAL;  Surgeon:  Kike cK, ;  Location: [facility] OR;  Service: Neurosurgery;  Laterality: N/A;  posterior cervical decompression/fusion C3-T1    GASTRIC BYPASS  2/5/2008    IVC FILTER RETRIEVAL      JOINT REPLACEMENT  1996 and 2001    bi-lat hip replacement/Rt Hip and Lt Hip    RADIOFREQUENCY ABLATION OF LUMBAR MEDIAL BRANCH NERVE AT SINGLE LEVEL Bilateral 1/10/2020    Procedure: Radiofrequency Ablation, Nerve, Spinal, Lumbar, Medial Branch, 1 Level;  Surgeon: Evangelist Bose MD;  Location: [facility] OR;  Service: Pain Management;  Laterality: Bilateral;  L3,L4,L5    RADIOFREQUENCY ABLATION OF LUMBAR MEDIAL BRANCH NERVE AT SINGLE LEVEL Bilateral 11/23/2021    Procedure: Radiofrequency Ablation, Nerve, Spinal, Lumbar, Medial Branch, Bilateral L 3,4,5;  Surgeon: Nile Causey MD;  Location: [facility] OR;  Service: Pain Management;  Laterality: Bilateral;    ROTATOR CUFF REPAIR      Rt shoulder    Stents  8/18/2010    x 3    UPPER GASTROINTESTINAL ENDOSCOPY  02/2011     Social History     Tobacco Use    Smoking status: Former    Smokeless tobacco: Never    Tobacco comments:     45 yrs ago, only smoked 3-4 packs in his entire life as a teenager   Substance Use Topics    Alcohol use: Not Currently     Comment: rare        Family History   Problem Relation Age of Onset    Heart attack Mother     Heart attack Father     Heart attack Brother     Ulcerative colitis Daughter 35    Lupus Daughter     Colon cancer Neg Hx     Colon polyps Neg Hx     Crohn's disease Neg Hx     Melanoma Neg Hx     Psoriasis Neg Hx     Eczema Neg Hx        Pertinent medications noted: warfarin     Review of Systems:   No chills, fever, sweats, weight loss  No change in vision, loss of vision or diplopia  No sinus congestion, purulent nasal discharge, post nasal drip or facial pain  No pain in mouth or throat. No problems with teeth, gums.  No chest pain, palpitations, syncope  No cough, sputum production, shortness of breath, dyspnea on exertion,  pleurisy, hemoptysis  No dysphagia, odynophagia  No nausea, vomiting, diarrhea, constipation, blood in stool, or focal abd pain  No dysuria, hesitancy, hematuria, retention, incontinence  No swelling of joints, redness of joints, injuries, or new focal pain  No unusual headaches, dizziness, vertigo,prior fall 3/17/23  No anxiety, depression, substance abuse, sleep disturbance  No bleeding, lymphadenopathy  S/p fall with multiple ecchymosis on face/forehead, arms and legs     Outdoor activities: Resident of [facility], former smoker, no alcohol, 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 Alert and attentive, cooperative, comfortable on room air  Eyes:  Anicteric, EOMI resolving ecchymosis on eyelids b/l and all over face  ENT:  Moist oral mucosa, terrible oral hygiene, missing most of his upper teeth, has 3 lower teeth left, no ulcers, exudates, thrush, nares patent  Neck:  Supple  Lungs: Clear to auscultation b/l  Heart:  S1/S2+, regular rhythm, no murmurs  Abd:  Obese,   non tender to palpation, +BS, soft, s/p colostomy, leaking some sanguineous fluid  :  Ruelas, urine clear  Musc:  Except for R great toe, joints without effusion, swelling,  erythema, synovitis, non-ambulatory  Skin:  Warm, resolving ecchymosis on forehead, face, upper extremities and shins  Wounds: Sacral wound s/p extensive debridement, wound vac in place  4/20 Sacral wound with dressing in place, not disturbed   4/19: Sacrococcygeal unstageable  foul-smelling wound, R great toe with 2 non-healing ulcers, with surrounding redness c/w cellulitis  L foot with lateral unsteageable wound  Neuro:  Recovering from anesthesia  Psych:  Calm  Lymphatic:       Extrem: B/l legs with dressing in placed, not disturbed   VAD:  L Midline to be removed     Isolation: None    4/21:        4/19:      Sacrum             Sacrum                  Resolving ecchymosis face      R great toe  L lateral foot - unstageable wound    General Labs reviewed:  Recent Labs   Lab 04/23/23  0509 04/24/23  0452 04/25/23  0444   WBC 10.80 11.38 11.37   HGB 7.4* 7.2* 8.0*   HCT 22.7* 21.9* 24.0*    306 352       Recent Labs   Lab 04/23/23  0509 04/24/23  0452 04/25/23  0444    142 142   K 4.2 3.6 3.7   * 117* 116*   CO2 21* 19* 18*   BUN 43* 48* 45*   CREATININE 1.6* 1.8* 1.8*   CALCIUM 8.5* 8.7 9.1   PROT 4.5* 4.7* 5.4*   BILITOT 1.1* 0.9 0.7   ALKPHOS 319* 269* 254*   * 97* 84*   * 60* 54*     Recent Labs   Lab 04/22/23  0325   CRP 12.56*     No results for input(s): SEDRATE in the last 168 hours.    Estimated Creatinine Clearance: 40.7 mL/min (A) (based on SCr of 1.8 mg/dL (H)).       Prior Micro:   Wound cultures left foot 10/18/2022 pansensitive Proteus mirabilis  Wound cultures left foot 08/24/2022 pansensitive Proteus mirabilis, and Porphyromonas somerae    Micro:  Blood cultures x 2 4/18 at [facility] 1/4 bottles GPR, Eggerthella lenta    MRSA SCREEN BY PCR Negative    Blood culture [[id number]] Collected: 04/18/23 1732   Order Status: Completed Specimen: Blood from Antecubital, Right Updated: 04/23/23 2212    Blood Culture, Routine No growth after 5 days.   Blood culture [[id number]] (Abnormal) Collected: 04/18/23 0889   Order Status: Completed Specimen: Blood from Antecubital, Right Arm Updated: 04/23/23 1508    Blood Culture, Routine Gram stain miroslava bottle: Gram positive rods     Results called to and read back by:Dipesh Gotti RN 04/21/2023     04:23      EGGERTHELLA LENTA Abnormal    Rapid Organism ID by PCR (from Blood culture) [[id number]] Collected: 04/18/23 1659   Order Status: Completed Updated: 04/21/23 0609    Enterococcus faecalis Not Detected    Enterococcus faecium Not Detected    Listeria Monocytogenes Not Detected    Staphylococcus spp. Not Detected    Staphylococcus aureus Not Detected    Staphylococcus epidermidis Not Detected    Staphylococcus lugdunensis Not Detected    Streptococcus species Not Detected    Streptococcus agalactiae Not Detected    Streptococcus pneumoniae Not Detected    Streptococcus pyogenes Not Detected    Acinetobacter calcoaceticus/baumannii complex Not Detected    Bacteroides fragilis Not Detected    Enterobacerales Not Detected    Enterobacter cloacae complex Not Detected    Escherichia Not Detected    Klebsiella aerogenes Not Detected    Klebsiella oxytoca Not Detected    Klebsiella pneumoniae group Not Detected    Proteus Not Detected    Salmonella sp Not Detected    Serratia marcescens Not Detected    Haemophilus influenzae Not Detected    Neisseria meningtidis Not Detected    Pseudomonas aeruginosa Not Detected    Stenotrophomonas maltophilia Not Detected    Candida albicans Not Detected    Candida auris Not Detected    Candida glabrata Not Detected    Candida krusei Not Detected    Candida parapsilosis Not Detected    Candida tropicalis Not Detected    Cryptococcus neoformans/gattii Not Detected    CTX-M (ESBL ) Not Detected    IMP (Carbapenem resistant) Not Detected    KPC resistance gene (Carbapenem resistant) Not Detected    mcr-1  Not Detected    mec A/C  Not Detected    mec A/C and MREJ (MRSA) gene Not Detected    NDM (Carbapenem resistant) Not Detected    OXA-48-like (Carbapenem resistant) Not Detected    van A/B (VRE gene) Not Detected    VIM (Carbapenem resistant) Not Detected       Microbiology Results (last 7 days)       Procedure Component Value Units Date/Time    Blood culture [[id number]] Collected:  04/21/23 1247    Order Status: Completed Specimen: Blood Updated: 04/25/23 1432     Blood Culture, Routine No Growth to date      No Growth to date      No Growth to date      No Growth to date      No Growth to date    Narrative:      Collection has been rescheduled by RE1 at 04/21/2023 12:33 Reason:   Unable to collect  Collection has been rescheduled by RE1 at 04/21/2023 12:33 Reason:   Unable to collect    AFB Culture & Smear [[id number]] Collected: 04/20/23 1901    Order Status: Completed Specimen: Wound from Sacrum Updated: 04/23/23 1618     AFB CULTURE STAIN No acid fast bacilli seen.     AFB CULTURE STAIN Testing performed by:     AFB CULTURE STAIN Lab [facility]     [facility] [address] [facility]     [place] [address]     AFB CULTURE STAIN Dr.Brian Gareth MD    Narrative:      Sacral Wound    Culture, Anaerobe [[id number]] Collected: 04/20/23 1901    Order Status: Completed Specimen: Wound from Sacrum Updated: 04/23/23 1254     Anaerobic Culture Culture in progress    Narrative:      Sacral Wound    Culture, Anaerobic [[id number]] Collected: 04/19/23 1239    Order Status: Completed Specimen: Wound from Sacrum Updated: 04/23/23 1055     Anaerobic Culture No anaerobes isolated    Narrative:      Sacrum  (SWAB)    Aerobic culture [[id number]]  (Abnormal)  (Susceptibility) Collected: 04/20/23 1901    Order Status: Completed Specimen: Wound from Sacrum Updated: 04/23/23 0800     Aerobic Bacterial Culture MORGANELLA MORGANII  Few      Narrative:      Sacral Wound    Gram stain [[id number]] Collected: 04/20/23 1901    Order Status: Completed Specimen: Wound from Sacrum Updated: 04/21/23 1130     Gram Stain Result Few WBC's      Few Gram positive cocci      Rare Gram negative rods    Narrative:      Sacral Wound    Aerobic culture [[id number]]  (Abnormal)  (Susceptibility) Collected: 04/19/23 1130    Order Status: Completed Specimen: Wound from Sacrum Updated: 04/21/23 0747      Aerobic Bacterial Culture PROTEUS MIRABILIS  Moderate      Narrative:      Sacrum  (SWAB)    Fungus culture [[id number]] Collected: 04/20/23 1901    Order Status: Sent Specimen: Wound from Sacrum Updated: 04/20/23 1915    MRSA Screen by PCR [[id number]] Collected: 04/19/23 0940    Order Status: Completed Specimen: Nasopharyngeal Swab from Nasal Updated: 04/19/23 1153     MRSA SCREEN BY PCR Negative           Collected: 04/19/23 1130   Order Status: Completed Specimen: Wound from Sacrum Updated: 04/21/23 0747    Aerobic Bacterial Culture PROTEUS MIRABILIS   Moderate    Abnormal    Narrative:     Sacrum  (SWAB)   Susceptibility     Proteus mirabilis     CULTURE, AEROBIC  (SPECIFY SOURCE)     Amp/Sulbactam <=4/2 mcg/mL Sensitive     Ampicillin <=8 mcg/mL Sensitive     Cefazolin <=2 mcg/mL Sensitive     Cefepime <=2 mcg/mL Sensitive     Ceftriaxone <=1 mcg/mL Sensitive     Ciprofloxacin <=1 mcg/mL Sensitive     Ertapenem <=0.5 mcg/mL Sensitive     Gentamicin <=4 mcg/mL Sensitive     Levofloxacin <=2 mcg/mL Sensitive     Meropenem <=1 mcg/mL Sensitive     Piperacillin/Tazo <=16 mcg/mL Sensitive     Tobramycin <=4 mcg/mL Sensitive     Trimeth/Sulfa <=2/38 mcg/mL Sensitive              Imaging Reviewed:  CXR  CT head   X-ray R foot: No radiographic evidence of osteomyelitis.  LE Arterial US: No clinically significant stenosis, large vessel occlusion or aneurysm in the visualized arteries of the lower extremities.     Cardiology:       IMPRESSION & PLAN     Septic shock resolved in the setting of unstageable foul-smelling sacrococcygeal ulcer s/p I&D by Surgery 4/20   at [facility], high--->12.56 here, trending down   Procal 2.49-->1.39  Repeat blood cultures x 1 no growth to date  Blood cultures x 2 at [facility] 1/4 bottles Eggerthella lenta  Bedside culture 4/19 Pansensitive Proteus mirabilis   OR cultures Morganella morgannii sensitive to Ceftriaxone     2. B/l feet non healing ulcers, likely DTIs from being bed-bound    3. EMMY,  cr 2.0, baseline 1.8, trending down    4. Transaminitis, trending down    5. Prior supratherapeutic INR, 9.4, reversed 1.8 4/23     6. PMHx: HTN, DM, CAD s/p MI and cardiac stents, AFib, MTHFR mutation on Coumadin, HLD, gout, ongoing dementia. Recent fall and cervical fracture 3/2022      Recommendations:  Continue Rocephin 2g IV daily for Morganella and Proteus  Flagyl 500mg IV q8h for Eggerthella lenta  Above for at least 6 weeks for sacral decub ulcers  Wound care to all affected areas, wound vac to sacrum   OK for PICC line  Aspiration precautions     Awaiting LTAC authorization    D/w patient, nursing, Dr Castillo    Will be available if needed, thank you      Medical Decision Making during this encounter was  [_] Low Complexity  [_] Moderate Complexity  [xx] High Complexity

## 2023-04-25 NOTE — ANESTHESIA PREPROCEDURE EVALUATION
04/25/2023  Richard Bustos is a 77 y.o., male.      Pre-op Assessment    I have reviewed the Patient Summary Reports.     I have reviewed the Nursing Notes. I have reviewed the NPO Status.   I have reviewed the Medications.     Review of Systems  Anesthesia Hx:  No problems with previous Anesthesia  History of prior surgery of interest to airway management or planning: cervical fusion. Denies Family Hx of Anesthesia complications.   Denies Personal Hx of Anesthesia complications.   Social:  Former Smoker, No Alcohol Use    Hematology/Oncology:         -- Anemia: Hematology Comments: Hx of transfusion  MTHFR mutation   Cardiovascular:   Hypertension, well controlled Past MI ( 2010) CAD  Dysrhythmias (hx of paroxysmal A fib) atrial fibrillation CHF ECG has been reviewed.    Pulmonary:   Sleep Apnea    Education provided regarding risk of obstructive sleep apnea     Renal/:   Chronic Renal Disease, CKD    Hepatic/GI:   GERD, well controlled    Musculoskeletal:   Arthritis   Spine Disorders: (recent lumbar surgery) lumbar Degenerative disease, Chronic Pain and Disc disease    Neurological:   Neuromuscular Disease,   Peripheral Neuropathy    Endocrine:   Diabetes, well controlled, type 2    Dermatological:   Sacral decubitus ulcer   Psych:   Psychiatric History (hx dementia, AMS on admission, still confused at times) depression          Patient Active Problem List   Diagnosis    Diabetes mellitus with chronic kidney disease, stage III    MTHFR mutation (methylenetetrahydrofolate reductase)    Sleep apnea, using BiPAP nightly, 1999, last sleep test in 2013    Hypertension associated with diabetes    CAD (coronary artery disease)    Hyperlipidemia associated with type 2 diabetes mellitus    Gout    GERD (gastroesophageal reflux disease)    Hypercoagulable state, Prothrombin mutation    Colon polyps     Anxiety    Obesity (BMI 30-39.9)    Carotid disease, bilateral    Aortic aneurysm, thoracic, 4.3 cm, 8/2010    CKD (chronic kidney disease) stage 3, GFR 30-59 ml/min, 41    LV dysfunction, EF 40%    Long term (current) use of anticoagulants    OA (osteoarthritis). post bilateral THR, 2001    Diabetic neuropathy    H/O bariatric surgery, 2008    Osteoarthritis, knee    BPH with obstruction/lower urinary tract symptoms    Proteinuria    Paroxysmal atrial fibrillation    Stasis dermatitis of both legs    Hypertensive left ventricular hypertrophy, without heart failure    Prostate cancer    Generalized weakness    Peripheral vascular disease    Facet arthropathy    Anemia due to stage 3 chronic kidney disease    Uncomplicated opioid dependence    MCI (mild cognitive impairment)    Cardiomyopathy    History of MI (myocardial infarction), 2010    Status post insertion of drug eluting coronary artery stent, 3x, last RCA 1/2015    Chronic systolic congestive heart failure    EMMY (acute kidney injury)    Back pain of lumbosacral region with sciatica    OAB (overactive bladder)    Urge incontinence    Complex renal cyst    Degenerative disc disease, lumbar    Hypoalbuminemia    Warfarin anticoagulation    Dementia without behavioral disturbance    History of arteriovenous malformation (AVM)    Septic shock    Secondary hyperparathyroidism of renal origin    Abdominal aortic atherosclerosis    Depression, recurrent    Ulcer of toe, left, with fat layer exposed    Closed nondisplaced fracture of sixth cervical vertebra    Multiple skin tears    Anemia    Pressure injury of sacral region, stage 4    Supratherapeutic INR    Proteus infection       Past Surgical History:   Procedure Laterality Date    ABDOMINAL SURGERY      CARDIAC SURGERY      3 STENTS     CAUDAL EPIDURAL STEROID INJECTION N/A 6/22/2020    Procedure: INJECTION, STEROID, SPINE, EPIDURAL, CAUDAL;  Surgeon: Evangelist  ALIZA Bose MD;  Location: [facility] OR;  Service: Pain Management;  Laterality: N/A;    COLONOSCOPY  02/2011    diverticulosis with diverticula with clot, no active bleeding    COLONOSCOPY N/A 8/24/2016    Procedure: COLONOSCOPY;  Surgeon: Saroj Borjas MD;  Location: [facility] ENDO;  Service: Endoscopy;  Laterality: N/A;    COLONOSCOPY N/A 11/18/2020    Procedure: COLONOSCOPY;  Surgeon: Saroj Borjas MD;  Location: [facility] ENDO;  Service: Endoscopy;  Laterality: N/A;    COLONOSCOPY N/A 10/27/2021    Procedure: COLONOSCOPY;  Surgeon: Saroj Borjas MD;  Location: [facility] ENDO;  Service: Endoscopy;  Laterality: N/A;    DEBRIDEMENT OF SACRAL WOUND N/A 4/20/2023    Procedure: DEBRIDEMENT, WOUND, SACRUM;  Surgeon: Mark Martinez Jr., MD;  Location: [facility] OR;  Service: General;  Laterality: N/A;    EPIDURAL STEROID INJECTION INTO LUMBAR SPINE N/A 6/22/2020    Procedure: Injection-steroid-epidural-lumbar;  Surgeon: Evangelist Bose MD;  Location: [facility] OR;  Service: Pain Management;  Laterality: N/A;  L3 L4 L5 S1    EPIDURAL STEROID INJECTION INTO LUMBAR SPINE N/A 9/21/2020    Procedure: Injection-steroid-epidural-lumbar;  Surgeon: Evangelist Bose MD;  Location: [facility] OR;  Service: Pain Management;  Laterality: N/A;  L5-S1    FUSION, SPINE, CERVICAL N/A 3/24/2023    Procedure: FUSION, SPINE, CERVICAL;  Surgeon: Kike Kc DO;  Location: [facility];  Service: Neurosurgery;  Laterality: N/A;  posterior cervical decompression/fusion C3-T1    GASTRIC BYPASS  2/5/2008    IVC FILTER RETRIEVAL      JOINT REPLACEMENT  1996 and 2001    bi-lat hip replacement/Rt Hip and Lt Hip    RADIOFREQUENCY ABLATION OF LUMBAR MEDIAL BRANCH NERVE AT SINGLE LEVEL Bilateral 1/10/2020    Procedure: Radiofrequency Ablation, Nerve, Spinal, Lumbar, Medial Branch, 1 Level;  Surgeon: Evangelist Bose MD;  Location: [facility];  Service: Pain Management;  Laterality: Bilateral;  L3,L4,L5    RADIOFREQUENCY ABLATION OF LUMBAR MEDIAL BRANCH NERVE  AT SINGLE LEVEL Bilateral 11/23/2021    Procedure: Radiofrequency Ablation, Nerve, Spinal, Lumbar, Medial Branch, Bilateral L 3,4,5;  Surgeon: Nile Causey MD;  Location: [facility];  Service: Pain Management;  Laterality: Bilateral;    ROTATOR CUFF REPAIR      Rt shoulder    Stents  8/18/2010    x 3    UPPER GASTROINTESTINAL ENDOSCOPY  02/2011        Tobacco Use:  The patient  reports that he has quit smoking. He has never used smokeless tobacco.     Results for orders placed or performed during the hospital encounter of 04/18/23   EKG 12-lead    Collection Time: 04/18/23  1:36 PM    Narrative    Test Reason : R41.82,    Vent. Rate : 083 BPM     Atrial Rate : 073 BPM     P-R Int : 000 ms          QRS Dur : 096 ms      QT Int : 370 ms       P-R-T Axes : 000 009 -15 degrees     QTc Int : 434 ms    Accelerated Junctional rhythm with occasional Premature ventricular  complexes  Low voltage QRS  ST elevation consider inferior injury or acute infarct    ACUTE MI / STEMI    Consider right ventricular involvement in acute inferior infarct  Abnormal ECG  When compared with ECG of 28-MAR-2023 09:09,  Junctional rhythm has replaced Sinus rhythm  Right bundle branch block is no longer Present    Referred By: [email or web address]   SELF           Confirmed By:              Lab Results   Component Value Date    WBC 11.37 04/25/2023    HGB 8.0 (L) 04/25/2023    HCT 24.0 (L) 04/25/2023     (H) 04/25/2023     04/25/2023     BMP  Lab Results   Component Value Date     04/25/2023    K 3.7 04/25/2023     (H) 04/25/2023    CO2 18 (L) 04/25/2023    BUN 45 (H) 04/25/2023    CREATININE 1.8 (H) 04/25/2023    CALCIUM 9.1 04/25/2023    ANIONGAP 8 04/25/2023    GLU 86 04/25/2023    GLU 87 04/24/2023    GLU 81 04/23/2023       Results for orders placed during the hospital encounter of 03/20/23    Echo    Interpretation Summary  · The left ventricle is normal in size with mildly decreased systolic function.  · Left ventricular  diastolic dysfunction.  · The estimated ejection fraction is 45%.  · There is mild left ventricular global hypokinesis.  · Normal right ventricular size with normal right ventricular systolic function.  · The aortic root is mildly dilated.  · The ascending aorta is dilated.  · Trivial posterior pericardial effusion.  · Mild aortic regurgitation.  · Mild tricuspid regurgitation.  · Normal central venous pressure (3 mmHg).          Physical Exam  General: Well nourished and Alert    Airway:  Mallampati: II   Mouth Opening: Normal  TM Distance: Normal  Tongue: Normal  Neck ROM: Normal ROM    Dental:  Intact    Chest/Lungs:  Clear to auscultation, Normal Respiratory Rate    Heart:  Rate: Normal  Rhythm: Regular Rhythm  Sounds: Normal        Anesthesia Plan  Type of Anesthesia, risks & benefits discussed:    Anesthesia Type: Gen ETT  Intra-op Monitoring Plan: Standard ASA Monitors  Post Op Pain Control Plan: multimodal analgesia  Induction:  IV  Informed Consent: Informed consent signed with the Patient and all parties understand the risks and agree with anesthesia plan.  All questions answered. Patient consented to blood products? Yes  ASA Score: 3  Anesthesia Plan Notes:     GETA  No Versed   Minimal Fentanyl  Zofran 4mg iv, Pepcid 20mg iv   Ofirmev 1000 mg iv  Sugammadex     Ready For Surgery From Anesthesia Perspective.     .

## 2023-04-25 NOTE — PROGRESS NOTES
[facility] Medicine  Progress Note    Patient name: Richard Bustos  MRN: [id number]  Admit Date: 4/19/2023   LOS: 6 days     SUBJECTIVE:     Principal problem: Septic shock  Chief Complaint   Patient presents with    Shortness of Breath       Interval History:  Doing well.  Plans for colostomy today.  Swelling to his left arm.  May need to replace midline.      Scheduled Meds:   allopurinoL  100 mg Oral QHS    amiodarone  200 mg Oral BID    atorvastatin  80 mg Oral Daily    calcitRIOL  0.5 mcg Oral Daily    cefTRIAXone (ROCEPHIN) IVPB  2 g Intravenous Q24H    collagenase   Topical (Top) Daily    famotidine  20 mg Oral Daily    ferrous sulfate  1 tablet Oral Daily    metronidazole  500 mg Intravenous Q8H    midodrine  5 mg Oral TID    multivitamin  1 tablet Oral Daily     Continuous Infusions:   sodium chloride 0.9% 50 mL/hr at 04/25/23 0841     PRN Meds:sodium chloride, acetaminophen, BUPivacaine-EPINEPHrine (PF) 0.25%-1:200,000, calcium gluconate IVPB, calcium gluconate IVPB, calcium gluconate IVPB, dextrose 50%, dextrose 50%, glucagon (human recombinant), glucose, glucose, insulin aspart U-100, LIDOcaine HCl 2%, magnesium sulfate IVPB, magnesium sulfate IVPB, melatonin, ondansetron, polyethylene glycol, potassium chloride **AND** potassium chloride **AND** potassium chloride, sodium chloride 0.9%, sodium phosphate IVPB, sodium phosphate IVPB, sodium phosphate IVPB    Review of patient's allergies indicates:   Allergen Reactions    Donepezil Other (See Comments)     AMS    Bactroban [mupirocin calcium] Blisters     Causes Blisters    Shellfish containing products Other (See Comments)     Other reaction(s): Gout  OYSTERS       Review of Systems: As per interval history    OBJECTIVE:     Vital Signs (Most Recent)  Temp: 97.6 °F (36.4 °C) (04/25/23 0837)  Pulse: 63 (04/25/23 0837)  Resp: 18 (04/25/23 0837)  BP: (!) 102/53 (notified nurse stefan) (04/25/23 0837)  SpO2: 98 % (04/25/23  0837)    Vital Signs Range (Last 24H):  Temp:  [97.6 °F (36.4 °C)-98.6 °F (37 °C)]   Pulse:  [63-78]   Resp:  [18]   BP: (100-123)/(53-67)   SpO2:  [96 %-98 %]     I & O (Last 24H):  Intake/Output Summary (Last 24 hours) at 4/25/2023 1444  Last data filed at 4/25/2023 1401  Gross per 24 hour   Intake 1120 ml   Output 900 ml   Net 220 ml       Physical Exam:  General: Patient resting comfortably in no acute distress. Appears as stated age. Calm  Eyes: No conjunctival injection. No scleral icterus.  ENT: Hearing grossly intact. No discharge from ears. No nasal discharge.   Neck: Supple, trachea midline. No JVD  CVS: RRR. No LE edema BL  Lungs: CTA BL, no wheezing or crackles. Good breath sounds. No accessory muscle use. No acute respiratory distress  Abdomen:  Soft, nontender and nondistended.  No organomegaly  Neuro: AOx2. Moves all extremities. Follows commands. Responds appropriately   Skin:  sacral decub, bilateral feet with skin decay    Laboratory:  I have reviewed all pertinent lab results within the past 24 hours.    Diagnostic Results:       ASSESSMENT/PLAN:     Pt is a 78 y/o with the below medical problems, admitted with septic shock due to infected sacral decubitus ulcer    Active Hospital Problems    Diagnosis  POA    *Septic shock [A41.9, R65.21]  Yes    Proteus infection [A49.8]  Yes    Pressure injury of sacral region, stage 4 [L89.154]  Yes    Supratherapeutic INR [R79.1]  Yes    Anemia [D64.9]  Yes    Multiple skin tears [T14.8XXA]  Yes    Closed nondisplaced fracture of sixth cervical vertebra [S12.501A]  Yes    Dementia without behavioral disturbance [F03.90]  Yes    EMMY (acute kidney injury) [N17.9]  Yes     Chronic    Chronic systolic congestive heart failure [I50.22]  Yes    Peripheral vascular disease [I73.9]  Yes    Paroxysmal atrial fibrillation [I48.0]  Yes     EKG stable  Stable on Coreg, no missed doses of anticoagulation. Continue anticoagulation as described below        BPH with  obstruction/lower urinary tract symptoms [N40.1, N13.8]  Yes    Diabetic neuropathy [E11.40]  Yes    OA (osteoarthritis). post bilateral THR, 2001 [M19.90]  Yes    Long term (current) use of anticoagulants [Z79.01]  Not Applicable     Risk vs benefit of anticoagulation medication discussed at length with patient and daughter.  With his history of MTHFR, a fib and hypercoagulability the benefit preventing of thrombus formation/stent reocclusion is difficult to balance with risk of bleeding.  That said, we will continue anticoagulation and reassess if further episodes of bleeding occur.   Monitor.        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         Plan:   - Continue zosyn per ID recs, Pending finalization of abx plan. He will need to go to LTAC for aggressive wound care, rehab, and antibiotics.   - colostomy today  - renal function stable  - check urine studies again, needs further IV fluids  - nutrition consult  - f/u INR  - continue lovenox  - continue midodrine  - follow culture results  - appreciate wound care > wound vac in place  - diet advanced   - appreciate surgery recommendations  - appreciate ID recommendations  - continue iron supplementation  - amiodarone for afib.       VTE Risk Mitigation (From admission, onward)           Ordered     Place KURTIS hose  Until discontinued          04/19/23 0157     IP VTE HIGH RISK PATIENT  Once         04/19/23 0157                      Department Hospital Medicine  [facility]  Brennen Castillo MD  Date of service: 04/25/2023

## 2023-04-26 LAB
ALBUMIN SERPL BCP-MCNC: 2 G/DL (ref 3.5–5.2)
ALP SERPL-CCNC: 200 U/L (ref 55–135)
ALT SERPL W/O P-5'-P-CCNC: 59 U/L (ref 10–44)
ANION GAP SERPL CALC-SCNC: 8 MMOL/L (ref 8–16)
AST SERPL-CCNC: 45 U/L (ref 10–40)
BACTERIA BLD CULT: NORMAL
BASOPHILS # BLD AUTO: 0.02 K/UL (ref 0–0.2)
BASOPHILS NFR BLD: 0.2 % (ref 0–1.9)
BILIRUB SERPL-MCNC: 0.9 MG/DL (ref 0.1–1)
BUN SERPL-MCNC: 39 MG/DL (ref 8–23)
CALCIUM SERPL-MCNC: 8.5 MG/DL (ref 8.7–10.5)
CHLORIDE SERPL-SCNC: 116 MMOL/L (ref 95–110)
CO2 SERPL-SCNC: 17 MMOL/L (ref 23–29)
CREAT SERPL-MCNC: 1.6 MG/DL (ref 0.5–1.4)
CRP SERPL-MCNC: 7.79 MG/DL
DIFFERENTIAL METHOD: ABNORMAL
EOSINOPHIL # BLD AUTO: 0.1 K/UL (ref 0–0.5)
EOSINOPHIL NFR BLD: 0.8 % (ref 0–8)
ERYTHROCYTE [DISTWIDTH] IN BLOOD BY AUTOMATED COUNT: 15.3 % (ref 11.5–14.5)
EST. GFR  (NO RACE VARIABLE): 44.7 ML/MIN/1.73 M^2
GLUCOSE SERPL-MCNC: 65 MG/DL (ref 70–110)
GLUCOSE SERPL-MCNC: 72 MG/DL (ref 70–110)
GLUCOSE SERPL-MCNC: 74 MG/DL (ref 70–110)
GLUCOSE SERPL-MCNC: 77 MG/DL (ref 70–110)
GLUCOSE SERPL-MCNC: 85 MG/DL (ref 70–110)
HCT VFR BLD AUTO: 22.1 % (ref 40–54)
HGB BLD-MCNC: 7.3 G/DL (ref 14–18)
IMM GRANULOCYTES # BLD AUTO: 0.16 K/UL (ref 0–0.04)
IMM GRANULOCYTES NFR BLD AUTO: 1.5 % (ref 0–0.5)
LYMPHOCYTES # BLD AUTO: 0.7 K/UL (ref 1–4.8)
LYMPHOCYTES NFR BLD: 7 % (ref 18–48)
MAGNESIUM SERPL-MCNC: 1.7 MG/DL (ref 1.6–2.6)
MCH RBC QN AUTO: 33.5 PG (ref 27–31)
MCHC RBC AUTO-ENTMCNC: 33 G/DL (ref 32–36)
MCV RBC AUTO: 101 FL (ref 82–98)
MONOCYTES # BLD AUTO: 0.3 K/UL (ref 0.3–1)
MONOCYTES NFR BLD: 2.6 % (ref 4–15)
NEUTROPHILS # BLD AUTO: 9.3 K/UL (ref 1.8–7.7)
NEUTROPHILS NFR BLD: 87.9 % (ref 38–73)
NRBC BLD-RTO: 0 /100 WBC
PHOSPHATE SERPL-MCNC: 3.6 MG/DL (ref 2.7–4.5)
PLATELET # BLD AUTO: 342 K/UL (ref 150–450)
PMV BLD AUTO: 10.4 FL (ref 9.2–12.9)
POTASSIUM SERPL-SCNC: 3.3 MMOL/L (ref 3.5–5.1)
PROT SERPL-MCNC: 4.9 G/DL (ref 6–8.4)
RBC # BLD AUTO: 2.18 M/UL (ref 4.6–6.2)
SODIUM SERPL-SCNC: 141 MMOL/L (ref 136–145)
WBC # BLD AUTO: 10.58 K/UL (ref 3.9–12.7)

## 2023-04-26 PROCEDURE — 12000002 HC ACUTE/MED SURGE SEMI-PRIVATE ROOM

## 2023-04-26 PROCEDURE — 87070 CULTURE OTHR SPECIMN AEROBIC: CPT | Performed by: FAMILY MEDICINE

## 2023-04-26 PROCEDURE — 84100 ASSAY OF PHOSPHORUS: CPT | Performed by: SURGERY

## 2023-04-26 PROCEDURE — 63600175 PHARM REV CODE 636 W HCPCS: Performed by: SURGERY

## 2023-04-26 PROCEDURE — S0030 INJECTION, METRONIDAZOLE: HCPCS | Performed by: SURGERY

## 2023-04-26 PROCEDURE — 97530 THERAPEUTIC ACTIVITIES: CPT

## 2023-04-26 PROCEDURE — 25000003 PHARM REV CODE 250: Performed by: SURGERY

## 2023-04-26 PROCEDURE — 87205 SMEAR GRAM STAIN: CPT | Performed by: FAMILY MEDICINE

## 2023-04-26 PROCEDURE — 97606 NEG PRS WND THER DME>50 SQCM: CPT

## 2023-04-26 PROCEDURE — 83735 ASSAY OF MAGNESIUM: CPT | Performed by: SURGERY

## 2023-04-26 PROCEDURE — 85025 COMPLETE CBC W/AUTO DIFF WBC: CPT | Performed by: SURGERY

## 2023-04-26 PROCEDURE — 87075 CULTR BACTERIA EXCEPT BLOOD: CPT | Performed by: FAMILY MEDICINE

## 2023-04-26 PROCEDURE — 36569 INSJ PICC 5 YR+ W/O IMAGING: CPT

## 2023-04-26 PROCEDURE — 80053 COMPREHEN METABOLIC PANEL: CPT | Performed by: SURGERY

## 2023-04-26 PROCEDURE — 86140 C-REACTIVE PROTEIN: CPT | Performed by: STUDENT IN AN ORGANIZED HEALTH CARE EDUCATION/TRAINING PROGRAM

## 2023-04-26 RX ADMIN — COLLAGENASE SANTYL: 250 OINTMENT TOPICAL at 11:04

## 2023-04-26 RX ADMIN — MIDODRINE HYDROCHLORIDE 5 MG: 2.5 TABLET ORAL at 11:04

## 2023-04-26 RX ADMIN — AMIODARONE HYDROCHLORIDE 200 MG: 200 TABLET ORAL at 08:04

## 2023-04-26 RX ADMIN — ATORVASTATIN CALCIUM 80 MG: 40 TABLET, FILM COATED ORAL at 08:04

## 2023-04-26 RX ADMIN — CALCITRIOL CAPSULES 0.25 MCG 0.5 MCG: 0.25 CAPSULE ORAL at 08:04

## 2023-04-26 RX ADMIN — METRONIDAZOLE 500 MG: 5 INJECTION, SOLUTION INTRAVENOUS at 08:04

## 2023-04-26 RX ADMIN — METRONIDAZOLE 500 MG: 5 INJECTION, SOLUTION INTRAVENOUS at 02:04

## 2023-04-26 RX ADMIN — METRONIDAZOLE 500 MG: 5 INJECTION, SOLUTION INTRAVENOUS at 05:04

## 2023-04-26 RX ADMIN — FERROUS SULFATE TAB 325 MG (65 MG ELEMENTAL FE) 1 EACH: 325 (65 FE) TAB at 08:04

## 2023-04-26 RX ADMIN — MIDODRINE HYDROCHLORIDE 5 MG: 2.5 TABLET ORAL at 05:04

## 2023-04-26 RX ADMIN — ALLOPURINOL 100 MG: 100 TABLET ORAL at 08:04

## 2023-04-26 RX ADMIN — FAMOTIDINE 20 MG: 20 TABLET ORAL at 08:04

## 2023-04-26 RX ADMIN — SODIUM CHLORIDE: 0.9 INJECTION, SOLUTION INTRAVENOUS at 08:04

## 2023-04-26 RX ADMIN — HYDROCODONE BITARTRATE AND ACETAMINOPHEN 1 TABLET: 5; 325 TABLET ORAL at 08:04

## 2023-04-26 RX ADMIN — THERA TABS 1 TABLET: TAB at 08:04

## 2023-04-26 RX ADMIN — CEFTRIAXONE SODIUM 2 G: 2 INJECTION, POWDER, FOR SOLUTION INTRAMUSCULAR; INTRAVENOUS at 04:04

## 2023-04-26 RX ADMIN — MIDODRINE HYDROCHLORIDE 5 MG: 2.5 TABLET ORAL at 04:04

## 2023-04-26 NOTE — PROCEDURES
Picc line placed in left upper arm. Picc line 42 cm in length. Initial arm circumference 34 cm. Picc line confirmed by portable chest x-ray. Occlussive   CHG drsg applied.

## 2023-04-26 NOTE — CONSULTS
Bilateral legs and feet dressing change.  Cleaned and dried and xerofoam applied, covered with abd pads, wrapped lightly with kerlix.  Left leg decrease in swelling, wound dry healing.  Lateral foot ulcer slight bleeding when cleaning.  Pale pink wound bed.  Redressed.  Right leg with open wounds pink wound bed, appear better than previous pictures.  Red discoloration posterior achilles area.  Cleaned and redressed.              Sacral wound stage 4 with bone palpable, tan brown necrotic tissue covering tendon, tissue open distal ulcer, tan slough, visible tissues gray,  wound undermines 5 cm at the 7 oclock position.  Measures 14x7x3.5cm.  cleaned and dried and applied Santyl to wound bed, white foam 4 and green foam 1, suction setting at -125mmhg.  Miminal bloody drainage in canister.  Abrasions to chest cleaned and applied thin layer of Triad.  Redress left arm and hand weeping elevated on pillow

## 2023-04-26 NOTE — PROCEDURES
"Debridement    Date/Time: 4/19/2023 1:20 AM  Performed by: Glenroy Eddy DO  Authorized by: Glenroy Eddy DO     Time out: Immediately prior to procedure a "time out" was called to verify the correct patient, procedure, equipment, support staff and site/side marked as required.    Consent Done?:  Yes (Written)    Preparation: Patient was prepped and draped with aseptic technique    Local anesthesia used?: Yes    Anesthesia:  Local infiltration  Local anesthetic:  Lidocaine 2% topical gel  Anesthetic total (ml):  10    Wound Details:    Location:  Sacrum    Type of Debridement:  Excisional       Length (cm):  14       Area (sq cm):  98       Width (cm):  7       Percent Debrided (%):  100       Depth (cm):  3.5       Total Area Debrided (sq cm):  98    Depth of debridement:  Bone    Tissue debrided:  Dermis, Epidermis, Subcutaneous, Muscle, Ligament, Tendon, Fascia and Bone    Devitalized tissue debrided:  Biofilm, Exudate, Fibrin, Necrotic/Eschar and Slough    Instruments:  Curette  Bleeding:  Minimal  Hemostasis Achieved: Yes  Method Used:  Pressure  Patient tolerance:  Patient tolerated the procedure well with no immediate complications  Specimen Collected: Specimen sent to microbiology     Bone specimen sent for culture  "

## 2023-04-26 NOTE — PLAN OF CARE
ERIC s/w Catie (PHN liaison) to follow up on peer to peer arrangement. Catie submitted peer to peer request to medical review team with Dr Castillo's contact info. [facility] Medical Director should reach out to Dr Castillo today. CM will continue to follow up in hopes of LTAC authorization for extended IV antibiotics on a Q 8 schedule and extensive wound care with wound vac.Patient also has a new colostomy requiring patient and family training for colostomy care.         04/26/23 3576   Discharge Reassessment   Assessment Type Discharge Planning Reassessment   Did the patient's condition or plan change since previous assessment? Yes   Discharge Plan discussed with: Adult children   Discharge Plan A Long-term acute care facility (LTAC)   Discharge Plan B Long-term acute care facility (LTAC)   DME Needed Upon Discharge  wound care supplies   Why the patient remains in the hospital Requires continued medical care   Post-Acute Status   Post-Acute Authorization Placement   Post-Acute Placement Status Pending payor review/awaiting authorization (if required)   Coverage PHN

## 2023-04-26 NOTE — PROGRESS NOTES
[facility]  Progress Note    Patient name: Richard Bustos  MRN: [id number]  Admit Date: 4/19/2023   LOS: 7 days     SUBJECTIVE:     Principal problem: Septic shock  Chief Complaint   Patient presents with    Shortness of Breath       Interval History:  successful colostomy yesterday, no complaints today. He remains encephalopathic with delirium. Ostomy with good output. He is developing additional coagulopathy      Scheduled Meds:   allopurinoL  100 mg Oral QHS    amiodarone  200 mg Oral BID    atorvastatin  80 mg Oral Daily    calcitRIOL  0.5 mcg Oral Daily    cefTRIAXone (ROCEPHIN) IVPB  2 g Intravenous Q24H    collagenase   Topical (Top) Daily    famotidine  20 mg Oral Daily    ferrous sulfate  1 tablet Oral Daily    metronidazole  500 mg Intravenous Q8H    midodrine  5 mg Oral TID    multivitamin  1 tablet Oral Daily     Continuous Infusions:   sodium chloride 0.9% 100 mL/hr at 04/26/23 0850     PRN Meds:sodium chloride, acetaminophen, calcium gluconate IVPB, calcium gluconate IVPB, calcium gluconate IVPB, dextrose 50%, dextrose 50%, glucagon (human recombinant), glucose, glucose, HYDROcodone-acetaminophen, insulin aspart U-100, LIDOcaine HCl 2%, magnesium sulfate IVPB, magnesium sulfate IVPB, melatonin, ondansetron, polyethylene glycol, potassium chloride **AND** potassium chloride **AND** potassium chloride, sodium phosphate IVPB, sodium phosphate IVPB, sodium phosphate IVPB    Review of patient's allergies indicates:   Allergen Reactions    Donepezil Other (See Comments)     AMS    Bactroban [mupirocin calcium] Blisters     Causes Blisters    Shellfish containing products Other (See Comments)     Other reaction(s): Gout  OYSTERS       Review of Systems: As per interval history    OBJECTIVE:     Vital Signs (Most Recent)  Temp: 96.6 °F (35.9 °C) (04/26/23 1127)  Pulse: 63 (04/26/23 1127)  Resp: 18 (04/26/23 1127)  BP: 126/60 (04/26/23 1127)  SpO2: 96 % (04/26/23 1127)    Vital  Signs Range (Last 24H):  Temp:  [96.6 °F (35.9 °C)-98 °F (36.7 °C)]   Pulse:  [55-69]   Resp:  [18-22]   BP: ()/(50-67)   SpO2:  [94 %-97 %]     I & O (Last 24H):  Intake/Output Summary (Last 24 hours) at 4/26/2023 1438  Last data filed at 4/26/2023 1432  Gross per 24 hour   Intake 1500 ml   Output 725 ml   Net 775 ml       Physical Exam:  General: Patient resting comfortably in no acute distress. Appears as stated age. Calm  Eyes: No conjunctival injection. No scleral icterus.  ENT: Hearing grossly intact. No discharge from ears. No nasal discharge.   Neck: Supple, trachea midline. No JVD  CVS: RRR. No LE edema BL  Lungs: CTA BL, no wheezing or crackles. Good breath sounds. No accessory muscle use. No acute respiratory distress  Abdomen:  Soft, nontender and nondistended.  No organomegaly, ostomy with brown stool   Neuro: AOx1. Moves all extremities. Following commands, nonfocal exam other than mental status changes.   Skin:  sacral decub, bilateral feet with skin decay    Laboratory:  I have reviewed all pertinent lab results within the past 24 hours.    Diagnostic Results:       ASSESSMENT/PLAN:     Pt is a 76 y/o with the below medical problems, admitted with septic shock due to infected sacral decubitus ulcer    Active Hospital Problems    Diagnosis  POA    *Septic shock [A41.9, R65.21]  Yes    Proteus infection [A49.8]  Yes    Pressure injury of sacral region, stage 4 [L89.154]  Yes    Supratherapeutic INR [R79.1]  Yes    Anemia [D64.9]  Yes    Multiple skin tears [T14.8XXA]  Yes    Closed nondisplaced fracture of sixth cervical vertebra [S12.501A]  Yes    Dementia without behavioral disturbance [F03.90]  Yes    EMMY (acute kidney injury) [N17.9]  Yes     Chronic    Chronic systolic congestive heart failure [I50.22]  Yes    Peripheral vascular disease [I73.9]  Yes    Paroxysmal atrial fibrillation [I48.0]  Yes     EKG stable  Stable on Coreg, no missed doses of anticoagulation. Continue anticoagulation as  described below        BPH with obstruction/lower urinary tract symptoms [N40.1, N13.8]  Yes    Diabetic neuropathy [E11.40]  Yes    OA (osteoarthritis). post bilateral THR, 2001 [M19.90]  Yes    Long term (current) use of anticoagulants [Z79.01]  Not Applicable     Risk vs benefit of anticoagulation medication discussed at length with patient and daughter.  With his history of MTHFR, a fib and hypercoagulability the benefit preventing of thrombus formation/stent reocclusion is difficult to balance with risk of bleeding.  That said, we will continue anticoagulation and reassess if further episodes of bleeding occur.   Monitor.        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         Plan:   - deescalated to rocephin and flagyl. Will need 6 weeks of TID IV abx  - colostomy in place and functioning well.   - labs pending today, INR rising  - check urine studies again, needs further IV fluids  - nutrition consult  - trend INR  - hold lovenox for AFib , pending INR results and mgmt of coagulopathy. He has significant bruising and bleeding from IV sites the past two days. May be nutritional deficiency causing coagulopathy  - continue midodrine  - follow culture results  - appreciate wound care > wound vac in place  - diet advanced   - appreciate surgery recommendations  - appreciate ID  recommendations  - continue iron supplementation  - amiodarone for afib.       VTE Risk Mitigation (From admission, onward)           Ordered     Place KURTIS hose  Until discontinued         04/19/23 0157     IP VTE HIGH RISK PATIENT  Once         04/19/23 0157                      Department Hospital Medicine  [facility]  Brennen Castillo MD  Date of service: 04/26/2023

## 2023-04-26 NOTE — PROGRESS NOTES
General Surgery Progress Note    Admit Date: 4/19/2023  S/P: Procedure(s) (LRB):  CREATION, COLOSTOMY, LAPAROSCOPIC (N/A)    Post-operative Day: 1 Day Post-Op    Hospital Day: 8    SUBJECTIVE:   Status post laparoscopic diverting loop sigmoid ostomy yesterday. Patient does not appear to be any significant distress today. Has baseline confusion. Ostomy is functioning.    OBJECTIVE:     Vital Signs (Most Recent)  Temp:  [96.6 °F (35.9 °C)-98 °F (36.7 °C)] 96.6 °F (35.9 °C)  Pulse:  [55-69] 63  Resp:  [18-22] 18  SpO2:  [94 %-97 %] 96 %  BP: ()/(51-67) 126/60    I&Os:  I/O last 3 completed shifts:  In: 2500 [I.V.:500; IV Piggyback:2000]  Out: 975 [Urine:850; Other:125]    Physical Exam:  Gen: NAD, alert but not necessarily oriented  HEENT: Anicteric sclera, ecchymosis  Pulm: unlabored, symmetrical   Abd: Soft with appropriate tenderness palpation, port sites intact, there is some abdominal ecchymosis, ostomy in the left upper abdomen with stool in bag, visualized portion of mucosa appears pink with improvement from yesterday though still some purplish discoloration in certain aspects    Laboratory:  CBC:   Recent Labs   Lab 04/26/23  1512   WBC 10.58   RBC 2.18*   HGB 7.3*   HCT 22.1*      *   MCH 33.5*   MCHC 33.0     CMP:   Recent Labs   Lab 04/26/23  1511   GLU 74   CALCIUM 8.5*   ALBUMIN 2.0*   PROT 4.9*      K 3.3*   CO2 17*   *   BUN 39*   CREATININE 1.6*   ALKPHOS 200*   ALT 59*   AST 45*   BILITOT 0.9     Labs within the past 24 hours have been reviewed.    ASSESSMENT/PLAN:     Patient Active Problem List    Diagnosis Date Noted    Proteus infection 04/22/2023    Pressure injury of sacral region, stage 4 04/19/2023    Supratherapeutic INR 04/19/2023    Anemia 04/01/2023    Multiple skin tears 03/25/2023    Closed nondisplaced fracture of sixth cervical vertebra 03/21/2023    Ulcer of toe, left, with fat layer exposed 01/15/2023    Depression, recurrent 10/30/2022    Abdominal  aortic atherosclerosis 01/24/2022    Secondary hyperparathyroidism of renal origin 01/19/2022    Septic shock 01/06/2022    History of arteriovenous malformation (AVM) 10/27/2021    Dementia without behavioral disturbance 03/14/2021    Warfarin anticoagulation 11/17/2020    Hypoalbuminemia 07/22/2020    Degenerative disc disease, lumbar 06/22/2020    OAB (overactive bladder) 03/31/2020    Urge incontinence 03/31/2020    Complex renal cyst 03/31/2020    Back pain of lumbosacral region with sciatica 11/13/2019    Chronic systolic congestive heart failure 10/19/2019    EMMY (acute kidney injury) 10/19/2019    History of MI (myocardial infarction), 2010 07/25/2019    Status post insertion of drug eluting coronary artery stent, 3x, last RCA 1/2015 07/25/2019    Cardiomyopathy 05/13/2019    MCI (mild cognitive impairment) 09/18/2018    Uncomplicated opioid dependence 08/13/2018    Anemia due to stage 3 chronic kidney disease 09/13/2017    Facet arthropathy 06/22/2017    Peripheral vascular disease 02/16/2017    Generalized weakness 12/06/2016    Prostate cancer 08/01/2016    Hypertensive left ventricular hypertrophy, without heart failure 02/08/2016    Stasis dermatitis of both legs 01/22/2015    Proteinuria 11/21/2014    Paroxysmal atrial fibrillation 11/21/2014    BPH with obstruction/lower urinary tract symptoms 11/04/2014    Osteoarthritis, knee 07/21/2014    H/O bariatric surgery, 2008 04/09/2014    Diabetic neuropathy 06/19/2013    OA (osteoarthritis). post bilateral THR, 2001 02/22/2013    Long term (current) use of anticoagulants 02/13/2013    LV dysfunction, EF 40% 02/08/2013    CKD (chronic kidney disease) stage 3, GFR 30-59 ml/min, 41 01/23/2013    Obesity (BMI 30-39.9) 07/10/2012    Carotid disease, bilateral 07/10/2012    Aortic aneurysm, thoracic, 4.3 cm, 8/2010 07/10/2012    Anxiety 02/05/2012    Diabetes mellitus with chronic kidney disease, stage III 12/16/2011    MTHFR mutation (methylenetetrahydrofolate  reductase) 12/16/2011    Sleep apnea, using BiPAP nightly, 1999, last sleep test in 2013 12/16/2011    Hypertension associated with diabetes 12/16/2011    CAD (coronary artery disease) 12/16/2011    Hyperlipidemia associated with type 2 diabetes mellitus 12/16/2011    Gout 12/16/2011    GERD (gastroesophageal reflux disease) 12/16/2011    Hypercoagulable state, Prothrombin mutation 12/16/2011    Colon polyps 12/16/2011         77 y.o. male status post debridement of necrotic stage IV pressure ulcer of the sacrum, status post diverting laparoscopic loop sigmoid ostomy  -ostomy functioning appropriately  -monitor hemoglobin  -discharge planning

## 2023-04-26 NOTE — PT/OT/SLP PROGRESS
Physical Therapy Treatment    Patient Name:  Richard Bustos   MRN:  [id number]    Recommendations:     Discharge Recommendations: LTACH (long-term acute care hospital), nursing facility, skilled  Discharge Equipment Recommendations: to be determined by next level of care  Barriers to discharge:  impaired cognition, max A x 2 for bed mobility, poor sitting balance, unable to stand or ambulate    Assessment:     Richard Bustos is a 77 y.o. male admitted with a medical diagnosis of Septic shock.  He presents with the following impairments/functional limitations: weakness, impaired endurance, impaired self care skills, impaired functional mobility, impaired balance, decreased lower extremity function, decreased safety awareness, pain, impaired cardiopulmonary response to activity, impaired cognition.    Pt found in bed with HOB elevated. Pt underwent colostomy yesterday. Noted increase in confusion. RN aware. Pt is oriented to self at this time despite re-orientation. Pt requires max A x 2 for bed mobility. Sitting EOB with max A due to posterior lean. Intermittently ablity to folllow commands for midline seated position throughout limited due to impaired cognition and inconsistent command follow.     Rehab Prognosis: Fair; patient would benefit from acute skilled PT services to address these deficits and reach maximum level of function.    Recent Surgery: Procedure(s) (LRB):  CREATION, COLOSTOMY, LAPAROSCOPIC (N/A) 1 Day Post-Op    Plan:     During this hospitalization, patient to be seen 5 x/week to address the identified rehab impairments via therapeutic activities, therapeutic exercises, neuromuscular re-education and progress toward the following goals:    Plan of Care Expires:  05/26/23    Subjective     Chief Complaint: wants chocolate pudding  Patient/Family Comments/goals: per hospital record to go to LTAC  Pain/Comfort:  Pain Rating 1: other (see comments) (did not quantify)  Location 1: back  Pain Addressed 1:  Reposition, Distraction, Cessation of Activity      Objective:     Communicated with RN prior to session.  Patient found HOB elevated with bed alarm, mcdaniels catheter, peripheral IV, telemetry, blood pressure cuff upon PT entry to room.     General Precautions: Standard, fall  Orthopedic Precautions: N/A  Braces: N/A  Respiratory Status: Room air     Functional Mobility:  Bed Mobility:     Supine to Sit: maximal assistance and of 2 persons  Sit to Supine: maximal assistance and of 2 persons      AM-PAC 6 CLICK MOBILITY          Treatment & Education:  Pt educated on POC, discharge recommendation, importance of time OOB, midline seated positioning, sitting balance, need for assist with mobility, use of call bell to seek assistance as needed and fall prevention      Patient left HOB elevated with all lines intact, call button in reach, bed alarm on, and RN notified..    GOALS:   Multidisciplinary Problems       Physical Therapy Goals          Problem: Physical Therapy    Goal Priority Disciplines Outcome Goal Variances Interventions   Physical Therapy Goal     PT, PT/OT      Description: Goals to be met by: 23     Patient will increase functional independence with mobility by performin. Supine to sit with Maximum Assistance  2. Rolling to Left and Right with Moderate Assistance.  3. Lower extremity exercise program x20 reps per handout, with assistance as needed                         Time Tracking:     PT Received On: 23  PT Start Time: 0837     PT Stop Time: 0900  PT Total Time (min): 23 min     Billable Minutes: Therapeutic Activity 23    Treatment Type: Treatment  PT/PTA: PT     Number of PTA visits since last PT visit: 0     2023

## 2023-04-27 LAB
ALBUMIN SERPL BCP-MCNC: 1.7 G/DL (ref 3.5–5.2)
ALP SERPL-CCNC: 178 U/L (ref 55–135)
ALT SERPL W/O P-5'-P-CCNC: 45 U/L (ref 10–44)
ANION GAP SERPL CALC-SCNC: 5 MMOL/L (ref 8–16)
AST SERPL-CCNC: 38 U/L (ref 10–40)
BASOPHILS # BLD AUTO: 0.01 K/UL (ref 0–0.2)
BASOPHILS NFR BLD: 0.1 % (ref 0–1.9)
BILIRUB SERPL-MCNC: 0.7 MG/DL (ref 0.1–1)
BLD PROD TYP BPU: NORMAL
BLOOD UNIT EXPIRATION DATE: NORMAL
BLOOD UNIT TYPE CODE: 6200
BLOOD UNIT TYPE: NORMAL
BUN SERPL-MCNC: 38 MG/DL (ref 8–23)
CALCIUM SERPL-MCNC: 8.2 MG/DL (ref 8.7–10.5)
CHLORIDE SERPL-SCNC: 120 MMOL/L (ref 95–110)
CO2 SERPL-SCNC: 18 MMOL/L (ref 23–29)
CODING SYSTEM: NORMAL
CREAT SERPL-MCNC: 1.7 MG/DL (ref 0.5–1.4)
CROSSMATCH INTERPRETATION: NORMAL
DIFFERENTIAL METHOD: ABNORMAL
DISPENSE STATUS: NORMAL
EOSINOPHIL # BLD AUTO: 0.1 K/UL (ref 0–0.5)
EOSINOPHIL NFR BLD: 1.5 % (ref 0–8)
ERYTHROCYTE [DISTWIDTH] IN BLOOD BY AUTOMATED COUNT: 15.4 % (ref 11.5–14.5)
EST. GFR  (NO RACE VARIABLE): 41.5 ML/MIN/1.73 M^2
GLUCOSE SERPL-MCNC: 104 MG/DL (ref 70–110)
GLUCOSE SERPL-MCNC: 71 MG/DL (ref 70–110)
GLUCOSE SERPL-MCNC: 72 MG/DL (ref 70–110)
GLUCOSE SERPL-MCNC: 90 MG/DL (ref 70–110)
GLUCOSE SERPL-MCNC: 93 MG/DL (ref 70–110)
HCT VFR BLD AUTO: 20.1 % (ref 40–54)
HGB BLD-MCNC: 6.6 G/DL (ref 14–18)
IMM GRANULOCYTES # BLD AUTO: 0.13 K/UL (ref 0–0.04)
IMM GRANULOCYTES NFR BLD AUTO: 1.7 % (ref 0–0.5)
INR PPP: 2.7 (ref 0.8–1.2)
LYMPHOCYTES # BLD AUTO: 0.8 K/UL (ref 1–4.8)
LYMPHOCYTES NFR BLD: 10.3 % (ref 18–48)
MAGNESIUM SERPL-MCNC: 1.7 MG/DL (ref 1.6–2.6)
MCH RBC QN AUTO: 33.3 PG (ref 27–31)
MCHC RBC AUTO-ENTMCNC: 32.8 G/DL (ref 32–36)
MCV RBC AUTO: 102 FL (ref 82–98)
MONOCYTES # BLD AUTO: 0.2 K/UL (ref 0.3–1)
MONOCYTES NFR BLD: 3.2 % (ref 4–15)
NEUTROPHILS # BLD AUTO: 6.3 K/UL (ref 1.8–7.7)
NEUTROPHILS NFR BLD: 83.2 % (ref 38–73)
NRBC BLD-RTO: 0 /100 WBC
NUM UNITS TRANS PACKED RBC: NORMAL
PHOSPHATE SERPL-MCNC: 3.5 MG/DL (ref 2.7–4.5)
PLATELET # BLD AUTO: 300 K/UL (ref 150–450)
PMV BLD AUTO: 10.5 FL (ref 9.2–12.9)
POTASSIUM SERPL-SCNC: 3.3 MMOL/L (ref 3.5–5.1)
PROT SERPL-MCNC: 4.3 G/DL (ref 6–8.4)
PROTHROMBIN TIME: 27.6 SEC (ref 9–12.5)
RBC # BLD AUTO: 1.98 M/UL (ref 4.6–6.2)
SODIUM SERPL-SCNC: 143 MMOL/L (ref 136–145)
WBC # BLD AUTO: 7.55 K/UL (ref 3.9–12.7)

## 2023-04-27 PROCEDURE — 25000003 PHARM REV CODE 250: Performed by: SURGERY

## 2023-04-27 PROCEDURE — 63600175 PHARM REV CODE 636 W HCPCS: Performed by: SURGERY

## 2023-04-27 PROCEDURE — 12000002 HC ACUTE/MED SURGE SEMI-PRIVATE ROOM

## 2023-04-27 PROCEDURE — 83735 ASSAY OF MAGNESIUM: CPT | Performed by: SURGERY

## 2023-04-27 PROCEDURE — 85610 PROTHROMBIN TIME: CPT | Performed by: STUDENT IN AN ORGANIZED HEALTH CARE EDUCATION/TRAINING PROGRAM

## 2023-04-27 PROCEDURE — 85025 COMPLETE CBC W/AUTO DIFF WBC: CPT | Performed by: SURGERY

## 2023-04-27 PROCEDURE — 84100 ASSAY OF PHOSPHORUS: CPT | Performed by: SURGERY

## 2023-04-27 PROCEDURE — 80053 COMPREHEN METABOLIC PANEL: CPT | Performed by: SURGERY

## 2023-04-27 PROCEDURE — 36415 COLL VENOUS BLD VENIPUNCTURE: CPT | Performed by: STUDENT IN AN ORGANIZED HEALTH CARE EDUCATION/TRAINING PROGRAM

## 2023-04-27 PROCEDURE — S0030 INJECTION, METRONIDAZOLE: HCPCS | Performed by: SURGERY

## 2023-04-27 PROCEDURE — P9016 RBC LEUKOCYTES REDUCED: HCPCS | Performed by: INTERNAL MEDICINE

## 2023-04-27 RX ORDER — HYDROCODONE BITARTRATE AND ACETAMINOPHEN 500; 5 MG/1; MG/1
TABLET ORAL
Status: DISCONTINUED | OUTPATIENT
Start: 2023-04-27 | End: 2023-04-28 | Stop reason: HOSPADM

## 2023-04-27 RX ADMIN — METRONIDAZOLE 500 MG: 5 INJECTION, SOLUTION INTRAVENOUS at 06:04

## 2023-04-27 RX ADMIN — MIDODRINE HYDROCHLORIDE 5 MG: 2.5 TABLET ORAL at 01:04

## 2023-04-27 RX ADMIN — CALCITRIOL CAPSULES 0.25 MCG 0.5 MCG: 0.25 CAPSULE ORAL at 09:04

## 2023-04-27 RX ADMIN — SODIUM CHLORIDE: 0.9 INJECTION, SOLUTION INTRAVENOUS at 09:04

## 2023-04-27 RX ADMIN — POTASSIUM CHLORIDE 60 MEQ: 7.46 INJECTION, SOLUTION INTRAVENOUS at 06:04

## 2023-04-27 RX ADMIN — FERROUS SULFATE TAB 325 MG (65 MG ELEMENTAL FE) 1 EACH: 325 (65 FE) TAB at 09:04

## 2023-04-27 RX ADMIN — HYDROCODONE BITARTRATE AND ACETAMINOPHEN 1 TABLET: 5; 325 TABLET ORAL at 09:04

## 2023-04-27 RX ADMIN — CEFTRIAXONE SODIUM 2 G: 2 INJECTION, POWDER, FOR SOLUTION INTRAMUSCULAR; INTRAVENOUS at 05:04

## 2023-04-27 RX ADMIN — ATORVASTATIN CALCIUM 80 MG: 40 TABLET, FILM COATED ORAL at 09:04

## 2023-04-27 RX ADMIN — METRONIDAZOLE 500 MG: 5 INJECTION, SOLUTION INTRAVENOUS at 02:04

## 2023-04-27 RX ADMIN — ALLOPURINOL 100 MG: 100 TABLET ORAL at 09:04

## 2023-04-27 RX ADMIN — FAMOTIDINE 20 MG: 20 TABLET ORAL at 09:04

## 2023-04-27 RX ADMIN — THERA TABS 1 TABLET: TAB at 09:04

## 2023-04-27 RX ADMIN — AMIODARONE HYDROCHLORIDE 200 MG: 200 TABLET ORAL at 09:04

## 2023-04-27 RX ADMIN — MIDODRINE HYDROCHLORIDE 5 MG: 2.5 TABLET ORAL at 05:04

## 2023-04-27 NOTE — PROGRESS NOTES
"[facility] Progress Note  Patient Name: Richard Bustos MRN: [id number]   Patient Class: IP- Inpatient  Length of Stay: 8   Admission Date: 4/19/2023  1:20 AM Attending Physician: Luis Alfredo Seals MD   Primary Care Provider: Familia Ramirez Jr, MD Face-to-Face encounter date: 04/27/2023   Chief Complaint: Shortness of Breath      Subjective:    Interval History   No new issues  Remains encephalopathic.   No concerns/issues ornight reported by the patient or the nursing staff.  Reviewed the labs and discussed the plan of care.   No family present at bedside.     Review of Systems   All other Review of Systems were found to be negative expect for that mentioned already in HPI.     Hospital Course     04/27/2023     allopurinoL  100 mg Oral QHS    amiodarone  200 mg Oral BID    atorvastatin  80 mg Oral Daily    calcitRIOL  0.5 mcg Oral Daily    cefTRIAXone (ROCEPHIN) IVPB  2 g Intravenous Q24H    collagenase   Topical (Top) Daily    famotidine  20 mg Oral Daily    ferrous sulfate  1 tablet Oral Daily    metronidazole  500 mg Intravenous Q8H    midodrine  5 mg Oral TID    multivitamin  1 tablet Oral Daily       sodium chloride, sodium chloride, sodium chloride, acetaminophen, calcium gluconate IVPB, calcium gluconate IVPB, calcium gluconate IVPB, dextrose 50%, dextrose 50%, glucagon (human recombinant), glucose, glucose, HYDROcodone-acetaminophen, insulin aspart U-100, LIDOcaine HCl 2%, magnesium sulfate IVPB, magnesium sulfate IVPB, melatonin, ondansetron, polyethylene glycol, potassium chloride **AND** potassium chloride **AND** potassium chloride, sodium phosphate IVPB, sodium phosphate IVPB, sodium phosphate IVPB      Objective:   Physical Exam  /71   Pulse 74   Temp 97.9 °F (36.6 °C) (Oral)   Resp 16   Ht 5' 8" (1.727 m)   Wt 100.4 kg (221 lb 5.5 oz)   SpO2 95%   BMI 33.65 kg/m²   Constitutional: No distress.   HENT: Atraumatic.   Cardiovascular: Normal rate, regular " rhythm and normal heart sounds.   Pulmonary/Chest: Effort normal. Clear to auscultation bilaterally. No wheezes.   Abdominal: Soft. Bowel sounds are normal. Exhibits no distension and no mass. No tenderness  Neurological: confused  Skin: Skin is warm and dry.     Labs and Imaging    Significant Labs: All pertinent labs within the past 24 hours have been reviewed.    Significant Imaging: I have reviewed all pertinent imaging results/findings within the past 24 hours.    I have reviewed the Vitals, labs and imaging as above.     Assessment & Plan:   Richard Bustos is a 77 y.o. male admitted for    Active Hospital Problems    Diagnosis    *Septic shock    Proteus infection    Pressure injury of sacral region, stage 4    Supratherapeutic INR    Anemia    Multiple skin tears    Closed nondisplaced fracture of sixth cervical vertebra    Dementia without behavioral disturbance    EMMY (acute kidney injury)    Chronic systolic congestive heart failure    Peripheral vascular disease    Paroxysmal atrial fibrillation     EKG stable  Stable on Coreg, no missed doses of anticoagulation. Continue anticoagulation as described below        BPH with obstruction/lower urinary tract symptoms    Diabetic neuropathy    OA (osteoarthritis). post bilateral THR, 2001    Long term (current) use of anticoagulants     Risk vs benefit of anticoagulation medication discussed at length with patient and daughter.  With his history of MTHFR, a fib and hypercoagulability the benefit preventing of thrombus formation/stent reocclusion is difficult to balance with risk of bleeding.  That said, we will continue anticoagulation and reassess if further episodes of bleeding occur.   Monitor.        CKD (chronic kidney disease) stage 3, GFR 30-59 ml/min, 41    Carotid disease, bilateral    CAD (coronary artery disease)    Diabetes mellitus with chronic kidney disease, stage III    GERD (gastroesophageal reflux disease)    Hyperlipidemia associated with type  2 diabetes mellitus     Stable on Atorvastatin 80 mg QD  Last lipid panel excellent  As now        Hypertension associated with diabetes    MTHFR mutation (methylenetetrahydrofolate reductase)       Plan  - deescalated to rocephin and flagyl. Will need 6 weeks of TID IV abx  - colostomy in place and functioning well.   - labs pending today, INR rising  - check urine studies again, needs further IV fluids  - nutrition consult  - trend INR  - hold lovenox for AFib , pending INR results and mgmt of coagulopathy. He has significant bruising and bleeding from IV sites the past two days. May be nutritional deficiency causing coagulopathy  - continue midodrine  - follow culture results  - appreciate wound care > wound vac in place  - diet advanced   - appreciate surgery recommendations  - appreciate ID recommendations  - continue iron supplementation  - amiodarone for afib.   Hopefully discharge soon to LTAC      Active PT: Yes  Active OT: Yes  Active SLP: No      VTE Risk Mitigation (From admission, onward)           Ordered     Place KURTIS hose  Until discontinued         04/19/23 0157     IP VTE HIGH RISK PATIENT  Once         04/19/23 0157                    Discharge Planning:   Discharge Planning   ORACIO: 04/29    Code Status: Full Code   Is the patient medically ready for discharge?: no    Reason for patient still in hospital (select all that apply): Patient trending condition  Discharge Plan A: Home with assistance from family        Above encounter included review of the medical records, interviewing and examining the patient face-to-face, discussion with family and other health care providers, ordering and interpreting lab/test results and formulating a plan of care.     Medical Decision Making:  [] Low Complexity  [x] Moderate Complexity  [] High Complexity    Luis Alfredo Seals MD  [facility] Hospitalist  04/27/2023

## 2023-04-27 NOTE — PT/OT/SLP PROGRESS
Occupational Therapy      Patient Name:  Richard Bustos   MRN:  [id number]    Patient not seen today secondary to Other (Comment), Nurse/ ZULAY hold. Will follow-up 4/28/23.    4/27/2023

## 2023-04-27 NOTE — PROGRESS NOTES
General Surgery Progress Note    Admit Date: 4/19/2023  S/P: Procedure(s) (LRB):  CREATION, COLOSTOMY, LAPAROSCOPIC (N/A)    Post-operative Day: 2 Days Post-Op    Hospital Day: 9    SUBJECTIVE:   Patient seen and examined and reports no pain.  He is tolerating full liquids. Ostomy with good output  OBJECTIVE:     Vital Signs (Most Recent)  Temp:  [96.6 °F (35.9 °C)-97.9 °F (36.6 °C)] 97.5 °F (36.4 °C)  Pulse:  [63-85] 64  Resp:  [16-18] 16  SpO2:  [96 %-98 %] 96 %  BP: (106-129)/(55-64) 106/55    I&Os:  I/O last 3 completed shifts:  In: 490 [P.O.:390; IV Piggyback:100]  Out: 1975 [Urine:1600; Stool:375]    Physical Exam:  Gen: NAD, alert but not necessarily oriented  HEENT: Anicteric sclera, ecchymosis  Pulm: unlabored, symmetrical   Abd: Soft with appropriate tenderness palpation, port sites intact, there is some abdominal ecchymosis, ostomy in the left upper abdomen with stool in bag, visualized portion of mucosa appears pink and viable    Laboratory:  CBC:   Recent Labs   Lab 04/27/23  0359   WBC 7.55   RBC 1.98*   HGB 6.6*   HCT 20.1*      *   MCH 33.3*   MCHC 32.8       CMP:   Recent Labs   Lab 04/27/23  0359   GLU 71   CALCIUM 8.2*   ALBUMIN 1.7*   PROT 4.3*      K 3.3*   CO2 18*   *   BUN 38*   CREATININE 1.7*   ALKPHOS 178*   ALT 45*   AST 38   BILITOT 0.7       Labs within the past 24 hours have been reviewed.    ASSESSMENT/PLAN:     Patient Active Problem List    Diagnosis Date Noted    Proteus infection 04/22/2023    Pressure injury of sacral region, stage 4 04/19/2023    Supratherapeutic INR 04/19/2023    Anemia 04/01/2023    Multiple skin tears 03/25/2023    Closed nondisplaced fracture of sixth cervical vertebra 03/21/2023    Ulcer of toe, left, with fat layer exposed 01/15/2023    Depression, recurrent 10/30/2022    Abdominal aortic atherosclerosis 01/24/2022    Secondary hyperparathyroidism of renal origin 01/19/2022    Septic shock 01/06/2022    History of arteriovenous  malformation (AVM) 10/27/2021    Dementia without behavioral disturbance 03/14/2021    Warfarin anticoagulation 11/17/2020    Hypoalbuminemia 07/22/2020    Degenerative disc disease, lumbar 06/22/2020    OAB (overactive bladder) 03/31/2020    Urge incontinence 03/31/2020    Complex renal cyst 03/31/2020    Back pain of lumbosacral region with sciatica 11/13/2019    Chronic systolic congestive heart failure 10/19/2019    EMMY (acute kidney injury) 10/19/2019    History of MI (myocardial infarction), 2010 07/25/2019    Status post insertion of drug eluting coronary artery stent, 3x, last RCA 1/2015 07/25/2019    Cardiomyopathy 05/13/2019    MCI (mild cognitive impairment) 09/18/2018    Uncomplicated opioid dependence 08/13/2018    Anemia due to stage 3 chronic kidney disease 09/13/2017    Facet arthropathy 06/22/2017    Peripheral vascular disease 02/16/2017    Generalized weakness 12/06/2016    Prostate cancer 08/01/2016    Hypertensive left ventricular hypertrophy, without heart failure 02/08/2016    Stasis dermatitis of both legs 01/22/2015    Proteinuria 11/21/2014    Paroxysmal atrial fibrillation 11/21/2014    BPH with obstruction/lower urinary tract symptoms 11/04/2014    Osteoarthritis, knee 07/21/2014    H/O bariatric surgery, 2008 04/09/2014    Diabetic neuropathy 06/19/2013    OA (osteoarthritis). post bilateral THR, 2001 02/22/2013    Long term (current) use of anticoagulants 02/13/2013    LV dysfunction, EF 40% 02/08/2013    CKD (chronic kidney disease) stage 3, GFR 30-59 ml/min, 41 01/23/2013    Obesity (BMI 30-39.9) 07/10/2012    Carotid disease, bilateral 07/10/2012    Aortic aneurysm, thoracic, 4.3 cm, 8/2010 07/10/2012    Anxiety 02/05/2012    Diabetes mellitus with chronic kidney disease, stage III 12/16/2011    MTHFR mutation (methylenetetrahydrofolate reductase) 12/16/2011    Sleep apnea, using BiPAP nightly, 1999, last sleep test in 2013 12/16/2011    Hypertension associated with diabetes  12/16/2011    CAD (coronary artery disease) 12/16/2011    Hyperlipidemia associated with type 2 diabetes mellitus 12/16/2011    Gout 12/16/2011    GERD (gastroesophageal reflux disease) 12/16/2011    Hypercoagulable state, Prothrombin mutation 12/16/2011    Colon polyps 12/16/2011         77 y.o. male status post debridement of necrotic stage IV pressure ulcer of the sacrum, status post diverting laparoscopic loop sigmoid ostomy  -ostomy functioning appropriately  -monitor hemoglobin, below 7 this morning, consider transfusion  -discharge planning

## 2023-04-27 NOTE — PLAN OF CARE
CM received LTAC auth from [facility]. [facility] is prepared to admit patient when medically clear. DR Seals notified of [facility] LTAC auth.

## 2023-04-27 NOTE — PLAN OF CARE
Problem: Oral Intake Inadequate  Goal: Improved Oral Intake  Outcome: Ongoing, Progressing  Intervention: Promote and Optimize Oral Intake  Flowsheets (Taken 4/27/2023 2007)  Oral Nutrition Promotion:   calorie-dense foods provided--Ensure +HP pudding   calorie-dense liquids provided--Landon BID [place]-thick     Problem: Skin Injury Risk Increased  Goal: Skin Health and Integrity  Intervention: Promote and Optimize Oral Intake  Flowsheets (Taken 4/27/2023 5386)  Oral Nutrition Promotion:   calorie-dense foods provided   calorie-dense liquids provided

## 2023-04-27 NOTE — PT/OT/SLP PROGRESS
Physical Therapy      Patient Name:  Richard Bustos   MRN:  [id number]    Patient not seen today secondary to Other (Comment) (low K, H&H 6.6 awaiting blood). Will follow-up 4/28/23.

## 2023-04-28 VITALS
DIASTOLIC BLOOD PRESSURE: 59 MMHG | RESPIRATION RATE: 18 BRPM | BODY MASS INDEX: 33.54 KG/M2 | HEIGHT: 68 IN | WEIGHT: 221.31 LBS | OXYGEN SATURATION: 98 % | HEART RATE: 72 BPM | TEMPERATURE: 98 F | SYSTOLIC BLOOD PRESSURE: 120 MMHG

## 2023-04-28 PROBLEM — E43 SEVERE PROTEIN-CALORIE MALNUTRITION: Status: ACTIVE | Noted: 2023-04-28

## 2023-04-28 PROBLEM — R79.1 SUPRATHERAPEUTIC INR: Status: RESOLVED | Noted: 2023-04-19 | Resolved: 2023-04-28

## 2023-04-28 LAB
ALBUMIN SERPL BCP-MCNC: 1.9 G/DL (ref 3.5–5.2)
ALP SERPL-CCNC: 193 U/L (ref 55–135)
ALT SERPL W/O P-5'-P-CCNC: 45 U/L (ref 10–44)
ANION GAP SERPL CALC-SCNC: 5 MMOL/L (ref 8–16)
AST SERPL-CCNC: 45 U/L (ref 10–40)
BACTERIA SPEC ANAEROBE CULT: NORMAL
BACTERIA SPEC ANAEROBE CULT: NORMAL
BASOPHILS # BLD AUTO: 0.02 K/UL (ref 0–0.2)
BASOPHILS NFR BLD: 0.2 % (ref 0–1.9)
BILIRUB SERPL-MCNC: 0.8 MG/DL (ref 0.1–1)
BUN SERPL-MCNC: 30 MG/DL (ref 8–23)
CALCIUM SERPL-MCNC: 8.4 MG/DL (ref 8.7–10.5)
CHLORIDE SERPL-SCNC: 120 MMOL/L (ref 95–110)
CO2 SERPL-SCNC: 18 MMOL/L (ref 23–29)
CREAT SERPL-MCNC: 1.4 MG/DL (ref 0.5–1.4)
DIFFERENTIAL METHOD: ABNORMAL
EOSINOPHIL # BLD AUTO: 0.1 K/UL (ref 0–0.5)
EOSINOPHIL NFR BLD: 1.3 % (ref 0–8)
ERYTHROCYTE [DISTWIDTH] IN BLOOD BY AUTOMATED COUNT: 16.3 % (ref 11.5–14.5)
EST. GFR  (NO RACE VARIABLE): 52.4 ML/MIN/1.73 M^2
GLUCOSE SERPL-MCNC: 118 MG/DL (ref 70–110)
GLUCOSE SERPL-MCNC: 82 MG/DL (ref 70–110)
GLUCOSE SERPL-MCNC: 82 MG/DL (ref 70–110)
HCT VFR BLD AUTO: 25.1 % (ref 40–54)
HGB BLD-MCNC: 8.3 G/DL (ref 14–18)
IMM GRANULOCYTES # BLD AUTO: 0.2 K/UL (ref 0–0.04)
IMM GRANULOCYTES NFR BLD AUTO: 2.3 % (ref 0–0.5)
INR PPP: 2 (ref 0.8–1.2)
LYMPHOCYTES # BLD AUTO: 0.8 K/UL (ref 1–4.8)
LYMPHOCYTES NFR BLD: 9 % (ref 18–48)
MAGNESIUM SERPL-MCNC: 1.6 MG/DL (ref 1.6–2.6)
MCH RBC QN AUTO: 32.8 PG (ref 27–31)
MCHC RBC AUTO-ENTMCNC: 33.1 G/DL (ref 32–36)
MCV RBC AUTO: 99 FL (ref 82–98)
MONOCYTES # BLD AUTO: 0.4 K/UL (ref 0.3–1)
MONOCYTES NFR BLD: 4.1 % (ref 4–15)
NEUTROPHILS # BLD AUTO: 7.3 K/UL (ref 1.8–7.7)
NEUTROPHILS NFR BLD: 83.1 % (ref 38–73)
NRBC BLD-RTO: 0 /100 WBC
PHOSPHATE SERPL-MCNC: 2.8 MG/DL (ref 2.7–4.5)
PLATELET # BLD AUTO: 306 K/UL (ref 150–450)
PMV BLD AUTO: 10.3 FL (ref 9.2–12.9)
POTASSIUM SERPL-SCNC: 3.4 MMOL/L (ref 3.5–5.1)
PROT SERPL-MCNC: 4.8 G/DL (ref 6–8.4)
PROTHROMBIN TIME: 20.9 SEC (ref 9–12.5)
RBC # BLD AUTO: 2.53 M/UL (ref 4.6–6.2)
SODIUM SERPL-SCNC: 143 MMOL/L (ref 136–145)
WBC # BLD AUTO: 8.73 K/UL (ref 3.9–12.7)

## 2023-04-28 PROCEDURE — S0030 INJECTION, METRONIDAZOLE: HCPCS | Performed by: SURGERY

## 2023-04-28 PROCEDURE — 97530 THERAPEUTIC ACTIVITIES: CPT

## 2023-04-28 PROCEDURE — 85610 PROTHROMBIN TIME: CPT | Performed by: STUDENT IN AN ORGANIZED HEALTH CARE EDUCATION/TRAINING PROGRAM

## 2023-04-28 PROCEDURE — 97166 OT EVAL MOD COMPLEX 45 MIN: CPT

## 2023-04-28 PROCEDURE — 25000003 PHARM REV CODE 250: Performed by: INTERNAL MEDICINE

## 2023-04-28 PROCEDURE — 85025 COMPLETE CBC W/AUTO DIFF WBC: CPT | Performed by: SURGERY

## 2023-04-28 PROCEDURE — 84100 ASSAY OF PHOSPHORUS: CPT | Performed by: SURGERY

## 2023-04-28 PROCEDURE — 25000003 PHARM REV CODE 250: Performed by: SURGERY

## 2023-04-28 PROCEDURE — 63600175 PHARM REV CODE 636 W HCPCS: Performed by: SURGERY

## 2023-04-28 PROCEDURE — 80053 COMPREHEN METABOLIC PANEL: CPT | Performed by: SURGERY

## 2023-04-28 PROCEDURE — 83735 ASSAY OF MAGNESIUM: CPT | Performed by: SURGERY

## 2023-04-28 RX ORDER — POTASSIUM CHLORIDE 20 MEQ/1
60 TABLET, EXTENDED RELEASE ORAL ONCE
Status: COMPLETED | OUTPATIENT
Start: 2023-04-28 | End: 2023-04-28

## 2023-04-28 RX ORDER — METRONIDAZOLE 500 MG/100ML
500 INJECTION, SOLUTION INTRAVENOUS EVERY 8 HOURS
Qty: 12000 ML | Refills: 0 | Status: ON HOLD
Start: 2023-04-28 | End: 2023-06-08 | Stop reason: HOSPADM

## 2023-04-28 RX ADMIN — FAMOTIDINE 20 MG: 20 TABLET ORAL at 08:04

## 2023-04-28 RX ADMIN — METRONIDAZOLE 500 MG: 5 INJECTION, SOLUTION INTRAVENOUS at 01:04

## 2023-04-28 RX ADMIN — FERROUS SULFATE TAB 325 MG (65 MG ELEMENTAL FE) 1 EACH: 325 (65 FE) TAB at 08:04

## 2023-04-28 RX ADMIN — CALCITRIOL CAPSULES 0.25 MCG 0.5 MCG: 0.25 CAPSULE ORAL at 08:04

## 2023-04-28 RX ADMIN — THERA TABS 1 TABLET: TAB at 08:04

## 2023-04-28 RX ADMIN — MAGNESIUM SULFATE HEPTAHYDRATE 2 G: 40 INJECTION, SOLUTION INTRAVENOUS at 08:04

## 2023-04-28 RX ADMIN — POTASSIUM CHLORIDE 60 MEQ: 1500 TABLET, EXTENDED RELEASE ORAL at 10:04

## 2023-04-28 RX ADMIN — METRONIDAZOLE 500 MG: 5 INJECTION, SOLUTION INTRAVENOUS at 10:04

## 2023-04-28 RX ADMIN — MIDODRINE HYDROCHLORIDE 5 MG: 2.5 TABLET ORAL at 11:04

## 2023-04-28 RX ADMIN — AMIODARONE HYDROCHLORIDE 200 MG: 200 TABLET ORAL at 08:04

## 2023-04-28 RX ADMIN — MIDODRINE HYDROCHLORIDE 5 MG: 2.5 TABLET ORAL at 05:04

## 2023-04-28 NOTE — PT/OT/SLP EVAL
Occupational Therapy   Evaluation    Name: Richard Bustos  MRN: [id number]  Admitting Diagnosis: Septic shock  Recent Surgery: Procedure(s) (LRB):  CREATION, COLOSTOMY, LAPAROSCOPIC (N/A) 3 Days Post-Op    Recommendations:     Discharge Recommendations: LTACH (long-term acute care hospital), nursing facility, skilled  Discharge Equipment Recommendations:  other (see comments) (TBD at next level of care)  Barriers to discharge:   (requires total assistance with ADLs)    Assessment:     Richard Bustos is a 77 y.o. male with a medical diagnosis of Septic shock.  Performance deficits affecting function: weakness, impaired endurance, impaired sensation, impaired functional mobility, gait instability, impaired balance, decreased upper extremity function, decreased lower extremity function, impaired cardiopulmonary response to activity, impaired cognition.  Fall at home while walking to car on 3/17/23 followed by [facility] and then subsequent admission to hospital. Patient was utilizing Mariya lift with transfer to wheelchair at [facility].     Rehab Prognosis: Fair; patient would benefit from acute skilled OT services to address these deficits and reach maximum level of function.       Plan:     Patient to be seen 5 x/week to address the above listed problems via self-care/home management, therapeutic activities, therapeutic exercises  Plan of Care Expires:  5/28/2023  Plan of Care Reviewed with: patient, daughter    Subjective     Chief Complaint: patient reports he is hot  Patient/Family Comments/goals: return home    Occupational Profile:  Living Environment: recently was at [facility]  Previous level of function: prior to fall patient performed self care with moderate independence  Equipment Used at Home: lift device, wheelchair  Assistance upon Discharge: assistance from facility staff     Pain/Comfort:  Pain Rating 1:  (did not rate)  Location 1: back  Pain Addressed 1: Distraction, Reposition  Pain Rating  Post-Intervention 1: 0/10    Patients cultural, spiritual, Oriental orthodox conflicts given the current situation: no    Objective:     Communicated with: nurse prior to session.  Patient found HOB elevated with bed alarm, mcdaniels catheter, peripheral IV, telemetry, PICC line upon OT entry to room.    General Precautions: Standard, fall  Orthopedic Precautions: N/A  Braces: N/A  Respiratory Status: Room air    Occupational Performance:    Bed Mobility:    Patient completed Rolling/Turning to Left with  maximal assistance x 2  Patient completed Rolling/Turning to Right with maximal assistance x 2    Activities of Daily Living:  Grooming: moderate assistance to wash face with head of bed elevated    Cognitive/Visual Perceptual:  Cognitive/Psychosocial Skills:     -       Oriented to: Person and Place   -       Follows Commands/attention:Easily distracted  -       Communication: clear/fluent  -       Memory: Poor immediate recall  -       Safety awareness/insight to disability: impaired   -       Mood/Affect/Coping skills/emotional control: Appropriate to situation; less confusion today; indicating needs    Physical Exam:  Upper Extremity Range of Motion:     -       Right Upper Extremity: WFL  -       Left Upper Extremity: WFL  Upper Extremity Strength:    -       Right Upper Extremity: 2+/5 per MMT   -       Left Upper Extremity: 2+/5 per MMT   Strength:    -       Right Upper Extremity: WFL  -       Left Upper Extremity: WFL  Fine Motor Coordination:    -       Intact    AMPAC 6 Click ADL:  AMPAC Total Score: 6    Treatment & Education:  Patient and family were educated on role of OT/POC, discharge planning, equipment needs, importance of repositioning and skin protection, and reviewed use of call bell for assistance.     Patient left HOB elevated with all lines intact, call button in reach, and family present    GOALS:   Patient will increase functional independence with ADLs by performing:    UE Dressing with Moderate  Assistance.  LE Dressing with Moderate Assistance.  Grooming while seated with Moderate Assistance.  Sitting at edge of bed x 15 minutes with Minimal Assistance.        History:     Past Medical History:   Diagnosis Date    Anemia     Anemia of other chronic disease 09/13/2017    Anemia, chronic renal failure 09/13/2017    Anemia, unspecified 09/13/2017    Anticoagulant long-term use     Aorta aneurysm     Arthritis     Atrial fibrillation     Bacteremia due to Streptococcus 01/08/2022    CAD (coronary artery disease)     CHF (congestive heart failure)     Chronic kidney disease     Clotting disorder 09/13/2017    Colon polyp     Dementia     Diabetes mellitus     not on meds    Diabetes mellitus type II     Diverticulosis     Elevated PSA     Encounter for blood transfusion     Former smoker     General anesthetics causing adverse effect in therapeutic use     TROUBLE COMING OUT OF ANESTHESIA WITH GASTRIC BYPASS    GERD (gastroesophageal reflux disease)     Gout     Hx of colonic polyps     Hypercoagulable state     Hyperlipidemia     Hypertension     MI, old 2010    MTHFR mutation     Myocardial infarction     9/2010    Osteomyelitis of ankle or foot 03/09/2017    Prostate cancer 06/2016    Sleep apnea     Squamous cell carcinoma 01/23/2018    Left helix, imiquimod    Venous stasis     Chronic         Past Surgical History:   Procedure Laterality Date    ABDOMINAL SURGERY      CARDIAC SURGERY      3 STENTS     CAUDAL EPIDURAL STEROID INJECTION N/A 6/22/2020    Procedure: INJECTION, STEROID, SPINE, EPIDURAL, CAUDAL;  Surgeon: Evangelist Bose MD;  Location: [facility] OR;  Service: Pain Management;  Laterality: N/A;    COLONOSCOPY  02/2011    diverticulosis with diverticula with clot, no active bleeding    COLONOSCOPY N/A 8/24/2016    Procedure: COLONOSCOPY;  Surgeon: Saroj Borjas MD;  Location: [facility];  Service: Endoscopy;  Laterality: N/A;    COLONOSCOPY N/A 11/18/2020    Procedure: COLONOSCOPY;  Surgeon: Saroj  NIHARIKA Borjas MD;  Location: [facility] ENDO;  Service: Endoscopy;  Laterality: N/A;    COLONOSCOPY N/A 10/27/2021    Procedure: COLONOSCOPY;  Surgeon: aSroj Borjas MD;  Location: [facility] ENDO;  Service: Endoscopy;  Laterality: N/A;    CREATION, COLOSTOMY, LAPAROSCOPIC N/A 4/25/2023    Procedure: CREATION, COLOSTOMY, LAPAROSCOPIC;  Surgeon: Mark Martinez Jr., MD;  Location: [facility] OR;  Service: General;  Laterality: N/A;    DEBRIDEMENT OF SACRAL WOUND N/A 4/20/2023    Procedure: DEBRIDEMENT, WOUND, SACRUM;  Surgeon: Mark Martinez Jr., MD;  Location: [facility] OR;  Service: General;  Laterality: N/A;    EPIDURAL STEROID INJECTION INTO LUMBAR SPINE N/A 6/22/2020    Procedure: Injection-steroid-epidural-lumbar;  Surgeon: Evangelist Bose MD;  Location: [facility];  Service: Pain Management;  Laterality: N/A;  L3 L4 L5 S1    EPIDURAL STEROID INJECTION INTO LUMBAR SPINE N/A 9/21/2020    Procedure: Injection-steroid-epidural-lumbar;  Surgeon: Evangelist Bose MD;  Location: [facility];  Service: Pain Management;  Laterality: N/A;  L5-S1    FUSION, SPINE, CERVICAL N/A 3/24/2023    Procedure: FUSION, SPINE, CERVICAL;  Surgeon: Kike Kc DO;  [facility];  Service: Neurosurgery;  Laterality: N/A;  posterior cervical decompression/fusion C3-T1    GASTRIC BYPASS  2/5/2008    IVC FILTER RETRIEVAL      JOINT REPLACEMENT  1996 and 2001    bi-lat hip replacement/Rt Hip and Lt Hip    RADIOFREQUENCY ABLATION OF LUMBAR MEDIAL BRANCH NERVE AT SINGLE LEVEL Bilateral 1/10/2020    Procedure: Radiofrequency Ablation, Nerve, Spinal, Lumbar, Medial Branch, 1 Level;  Surgeon: Evangelist Bose MD;  Location: [facility] OR;  Service: Pain Management;  Laterality: Bilateral;  L3,L4,L5    RADIOFREQUENCY ABLATION OF LUMBAR MEDIAL BRANCH NERVE AT SINGLE LEVEL Bilateral 11/23/2021    Procedure: Radiofrequency Ablation, Nerve, Spinal, Lumbar, Medial Branch, Bilateral L 3,4,5;  Surgeon: Nile Causey MD;  Location: [facility];  Service: Pain Management;   Laterality: Bilateral;    ROTATOR CUFF REPAIR      Rt shoulder    Stents  8/18/2010    x 3    UPPER GASTROINTESTINAL ENDOSCOPY  02/2011       Time Tracking:     OT Date of Treatment: 04/28/23  OT Start Time: 0959  OT Stop Time: 1008  OT Total Time (min): 9 min    Billable Minutes:Evaluation 9    4/28/2023

## 2023-04-28 NOTE — PLAN OF CARE
Patient cleared for discharge from case management standpoint.    Follow up appointments scheduled and added to AVS.  Riri [facility] liaison pulled DC orders from [facility]. Nurse Magdalena fabianalled report to Nurse Mariscal at [facility].     CM spoke with patients daughter, Citlali. Informed caregiver of discharge today to LTAC.    Chart and discharge orders reviewed.  Patient discharged to [facility] with no further case management needs.       04/28/23 1207   Final Note   Assessment Type Final Discharge Note   Anticipated Discharge Disposition LTAC   Hospital Resources/Appts/Education Provided Provided patient/caregiver with written discharge plan information   Post-Acute Status   Post-Acute Authorization Placement   Post-Acute Placement Status Set-up Complete/Auth obtained   Discharge Delays None known at this time

## 2023-04-28 NOTE — DISCHARGE SUMMARY
[facility]  Discharge Summary  Patient Name: Richard Bustos MRN: [id number]   Patient Class: IP- Inpatient  Length of Stay: 9   Admission Date: 4/19/2023  1:20 AM Attending Physician: Luis Alfredo Seals MD   Primary Care Provider: Familia Ramirez Jr, MD Face-to-Face encounter date: 04/28/2023   Chief Complaint: Shortness of Breath    Date of Discharge: 4/28/2023  Discharge Disposition:Long Term Acute Care    Condition: Stable       Reason for Hospitalization     Active Hospital Problems    Diagnosis    *Septic shock    Proteus infection    Pressure injury of sacral region, stage 4    Supratherapeutic INR    Anemia    Multiple skin tears    Closed nondisplaced fracture of sixth cervical vertebra    Dementia without behavioral disturbance    EMMY (acute kidney injury)    Chronic systolic congestive heart failure    Peripheral vascular disease    Paroxysmal atrial fibrillation    BPH with obstruction/lower urinary tract symptoms    Diabetic neuropathy    OA (osteoarthritis). post bilateral THR, 2001    Long term (current) use of anticoagulants    CKD (chronic kidney disease) stage 3, GFR 30-59 ml/min, 41    Carotid disease, bilateral    CAD (coronary artery disease)    Diabetes mellitus with chronic kidney disease, stage III    GERD (gastroesophageal reflux disease)    Hyperlipidemia associated with type 2 diabetes mellitus    Hypertension associated with diabetes    MTHFR mutation (methylenetetrahydrofolate reductase)         Brief History of Present Illness    Richard Bustos is a 77 y.o.  male who  has a past medical history of Anemia, Anemia of other chronic disease (09/13/2017), Anemia, chronic renal failure (09/13/2017), Anemia, unspecified (09/13/2017), Anticoagulant long-term use, Aorta aneurysm, Arthritis, Atrial fibrillation, Bacteremia due to Streptococcus (01/08/2022), CAD (coronary artery disease), CHF (congestive heart failure), Chronic kidney disease, Clotting disorder (09/13/2017), Colon polyp,  "Dementia, Diabetes mellitus, Diabetes mellitus type II, Diverticulosis, Elevated PSA, Encounter for blood transfusion, Former smoker, General anesthetics causing adverse effect in therapeutic use, GERD (gastroesophageal reflux disease), Gout, colonic polyps, Hypercoagulable state, Hyperlipidemia, Hypertension, MI, old (2010), MTHFR mutation, Myocardial infarction, Osteomyelitis of ankle or foot (03/09/2017), Prostate cancer (06/2016), Sleep apnea, Squamous cell carcinoma (01/23/2018), and Venous stasis.. The patient presented to [facility] on 4/19/2023 with a primary complaint of Shortness of Breath  .     For the full HPI please refer to the History & Physical from this admission.    Hospital Course By Problem with Pertinent Findings   Patient admitted for septic shock secondary to  sacral decubitus ulcer and required debridement.  Sacral wound was 5 x 7 cm and was found to have significant amount of underlying necrosis which extended through the subcutaneous fat and muscle layers down to the level of the overlying fascia from the sacrum.  Final wound measured 10 x 12 x 6 cm.  He remained in ICU post debridement recovering, on minimal dose pressors Levophed 0.03..  Micro reviewed, wound cultures from bedside debridement pansensitive Proteus mirabilis. Blood cultures x 2 at NS 1/4 bottles Eggerthella lenta. Bedside culture 4/19 Pansensitive Proteus mirabilis OR cultures Morganella morgannii sensitive to Ceftriaxone  Hospital course complicated by delirium. ID recommending discharge to LTAC on Continue Rocephin 2g IV daily for Morganella and Proteus  Flagyl 500mg IV q8h for Eggerthella lenta, Above for at least 6 weeks for sacral decub ulcers, Wound care to all affected areas, wound vac to sacrum. LTAC arranged and patient will be discharged.        Physical Exam  /68   Pulse 64   Temp 97.6 °F (36.4 °C) (Oral)   Resp 18   Ht 5' 8" (1.727 m)   Wt 100.4 kg (221 lb 5.5 oz)   SpO2 98%   BMI " 33.65 kg/m²   Vitals reviewed.    Constitutional: No distress.   HENT: Atraumatic.   Cardiovascular: Normal rate, regular rhythm and normal heart sounds.   Pulmonary/Chest: Effort normal. Clear to auscultation bilaterally. No wheezes.   Abdominal: Soft. Bowel sounds are normal. Exhibits no distension and no mass. No tenderness  Neurological: Alert.   Skin: Skin is warm and dry.     Following labs were Reviewed   Recent Labs   Lab 04/28/23  0415   WBC 8.73   HGB 8.3*   HCT 25.1*      CALCIUM 8.4*   ALBUMIN 1.9*   PROT 4.8*      K 3.4*   CO2 18*   *   BUN 30*   CREATININE 1.4   ALKPHOS 193*   ALT 45*   AST 45*   BILITOT 0.8     No results found for: POCTGLUCOSE     All labs within the past 24 hours have been reviewed    Microbiology Results (last 7 days)       Procedure Component Value Units Date/Time    Tissue culture [[id number]] Collected: 04/26/23 1114    Order Status: Completed Specimen: Bone from Sacrum Updated: 04/28/23 1141     Aerobic Culture - Tissue No growth     Gram Stain Result Few WBC's      No organisms seen    Narrative:      Sacrum    Blood culture [[id number]] Collected: 04/21/23 1247    Order Status: Completed Specimen: Blood Updated: 04/26/23 1432     Blood Culture, Routine No growth after 5 days.    Narrative:      Collection has been rescheduled by RE1 at 04/21/2023 12:33 Reason:   Unable to collect  Collection has been rescheduled by RE1 at 04/21/2023 12:33 Reason:   Unable to collect    Culture, Anaerobic [[id number]] Collected: 04/26/23 1114    Order Status: Sent Specimen: Bone from Sacrum Updated: 04/26/23 1131    Aerobic culture [[id number]] Collected: 04/26/23 1114    Order Status: Canceled Specimen: Bone from Sacrum     AFB Culture & Smear [[id number]] Collected: 04/20/23 1901    Order Status: Completed Specimen: Wound from Sacrum Updated: 04/23/23 1618     AFB CULTURE STAIN No acid fast bacilli seen.     AFB CULTURE STAIN Testing performed by:     AFB CULTURE STAIN Lab [facility]  [place]     AFB CULTURE STAIN [address] [facility]     [place] [address]     AFB CULTURE STAIN Dr.Brian Gareth MD    Narrative:      Sacral Wound    Culture, Anaerobe [[id number]] Collected: 04/20/23 1901    Order Status: Completed Specimen: Wound from Sacrum Updated: 04/23/23 1254     Anaerobic Culture Culture in progress    Narrative:      Sacral Wound    Culture, Anaerobic [[id number]] Collected: 04/19/23 1239    Order Status: Completed Specimen: Wound from Sacrum Updated: 04/23/23 1055     Anaerobic Culture No anaerobes isolated    Narrative:      Sacrum  (SWAB)    Aerobic culture [[id number]]  (Abnormal)  (Susceptibility) Collected: 04/20/23 1901    Order Status: Completed Specimen: Wound from Sacrum Updated: 04/23/23 0800     Aerobic Bacterial Culture MORGANELLA MORGANII  Few      Narrative:      Sacral Wound          X-Ray Chest AP Portable   Final Result      X-Ray Chest AP Portable   Final Result      US Upper Extremity Veins Left   Final Result      US Abdomen Limited   Final Result      X-Ray Lumbar Spine 2 Or 3 Views   Final Result      X-Ray Cervical Spine 2 or 3 Views   Final Result      US Lower Extremity Arteries Bilateral   Final Result      X-Ray Foot Complete Right   Final Result          No results found. However, due to the size of the patient record, not all encounters were searched. Please check Results Review for a complete set of results.      Consultants and Procedures   Consultants:  Consults (From admission, onward)          Status Ordering Provider     Inpatient consult to Social Work/Case Management  Once        Provider:  (Not yet assigned)    Acknowledged MANDY MCLAIN JR     Inpatient consult to   Once        Provider:  (Not yet assigned)    Acknowledged MANDY MCLAIN JR     Inpatient consult to Registered Dietitian/Nutritionist  Once        Provider:  (Not yet assigned)    Completed JAMA DAHL     Inpatient consult  to PICC team ([facility])  Once        Provider:  (Not yet assigned)    Acknowledged MANDY MCLAIN JR     IP consult to case management  Once        Provider:  (Not yet assigned)    Completed JAYSHREE MOSELEY     Inpatient consult to Registered Dietitian/Nutritionist  Once        Provider:  (Not yet assigned)    Completed JAYSHREE MOSELEY     Inpatient consult to General Surgery  Once        Provider:  Montez Boykin MD    Completed JAYSHREE MOSELEY     Inpatient consult to Infectious Diseases  Once        Provider:  Di Long MD    Completed JAYSHREE MOSELEY            Procedures:   Procedure(s) (LRB):  CREATION, COLOSTOMY, LAPAROSCOPIC (N/A)     Discharge Information:        Follow-up Information       Glenroy Eddy, DO Follow up in 1 week(s).    Specialty: Wound Care  Why: For wound re-check  Contact information:  [address]  [address]  [place] [address]  [phone]                               Pending laboratory work/Tests to be performed/followed by the Primary care Physician: None    The patient was discharged in the care of her parents//wife/family/caregiver, with discharge instructions were reviewed in written and verbal form. All pertinent questions were discussed and prescriptions were provided. The importance of making follow up appointments and compliance of medications has been stressed repeatedly. The patient will follow up in 1 week or sooner as needed with the PCP, and the patient is on board with the plan. Upon discharge, patient needs to be on following medications.    Discharge Medications:     Medication List        START taking these medications      ceftriaxone 2 g in 100 mL D5W 2 g/100 mL  IVPB  Inject 100 mLs (2 g total) into the vein once daily.     metronidazole 500 mg/100 mL IVPB  Commonly known as: FLAGYL  Inject 100 mLs (500 mg total) into the vein every 8 (eight) hours.            CHANGE how you take these medications      warfarin 5 MG tablet  Commonly known  as: COUMADIN  Take 1 tablet (5 mg total) by mouth Daily.  What changed: Another medication with the same name was removed. Continue taking this medication, and follow the directions you see here.            CONTINUE taking these medications      allopurinoL 100 MG tablet  Commonly known as: ZYLOPRIM  Take 1 tablet (100 mg total) by mouth every evening.     amiodarone 200 MG Tab  Commonly known as: PACERONE  Take 1 tablet (200 mg total) by mouth 2 (two) times daily.     atorvastatin 80 MG tablet  Commonly known as: LIPITOR  Take 1 tablet (80 mg total) by mouth once daily.     calcitRIOL 0.5 MCG Cap  Commonly known as: ROCALTROL  Take 1 capsule (0.5 mcg total) by mouth once daily.     famotidine 40 MG tablet  Commonly known as: PEPCID  Take 1 tablet (40 mg total) by mouth once daily.     ferrous sulfate 325 mg (65 mg iron) Tab tablet  Commonly known as: FeroSuL  TAKE 1 TABLET BY MOUTH TWICE DAILY            STOP taking these medications      aspirin 81 MG EC tablet  Commonly known as: ECOTRIN     CENTRUM SILVER ORAL     clopidogreL 75 mg tablet  Commonly known as: PLAVIX     doxycycline 100 MG tablet  Commonly known as: VIBRA-TABS     fluticasone propionate 50 mcg/actuation nasal spray  Commonly known as: FLONASE     L-METHYL-B6-B12 3-35-2 mg Tab  Generic drug: mecobal-levomefolat Ca-B6 phos     lisinopriL 20 MG tablet  Commonly known as: PRINIVIL,ZESTRIL     magnesium oxide 400 mg (241.3 mg magnesium) tablet  Commonly known as: MAG-OX     MYRBETRIQ 50 mg Tb24  Generic drug: mirabegron     nitroGLYCERIN 0.4 MG/DOSE TL SPRY 400 mcg/spray spray  Commonly known as: NITROLINGUAL     omeprazole 20 MG capsule  Commonly known as: PRILOSEC     prothrombin time/INR test metr Misc               Where to Get Your Medications        Information about where to get these medications is not yet available    Ask your nurse or doctor about these medications  ceftriaxone 2 g in 100 mL D5W 2 g/100 mL  IVPB  metronidazole 500 mg/100 mL  IVPB           I spent 50 minutes preparing the discharge including reviewing records from previous encounters, preparation of discharge summary, assessing and final examination of the patient, discharge medicine reconciliation, discussing plan of care, follow up and education and prescriptions.       Luis Alfredo Seals  [facility] Hospitalist  04/28/2023

## 2023-04-28 NOTE — PT/OT/SLP PROGRESS
Physical Therapy Treatment    Patient Name:  Richard Bustos   MRN:  [id number]    Recommendations:     Discharge Recommendations: LTACH (long-term acute care [facility])  Discharge Equipment Recommendations: to be determined by next level of care  Barriers to discharge:   max A x 2 for bed mobility, poor sitting balance, unable to stand or ambulate    Assessment:     Richard Bustos is a 77 y.o. male admitted with a medical diagnosis of Septic shock.  He presents with the following impairments/functional limitations: weakness, impaired endurance, impaired self care skills, impaired functional mobility, impaired balance, decreased lower extremity function, decreased safety awareness, pain, impaired cardiopulmonary response to activity.    Pt found in bed supine just completing patient care with extenders. Pt agreeable to visit. Pt presents with improved cognition but still a little confused. Able to follow all command for functional mobility and safety during visit. Pt required max A x 2 for bed mobility due to decrease anterior WS and hip flexion to achieve upright position. Once at EOB and R UE placed on bedside rail pt able to achieve and maintain midline seated position with mod A occ R lateral lean able to initiate R UE extension with VI to improve positioning.     Rehab Prognosis: Fair; patient would benefit from acute skilled PT services to address these deficits and reach maximum level of function.    Recent Surgery: Procedure(s) (LRB):  CREATION, COLOSTOMY, LAPAROSCOPIC (N/A) 3 Days Post-Op    Plan:     During this hospitalization, patient to be seen 5 x/week to address the identified rehab impairments via therapeutic activities, therapeutic exercises, neuromuscular re-education and progress toward the following goals:    Plan of Care Expires:  05/28/23    Subjective     Chief Complaint: back discomfort  Patient/Family Comments/goals: none stated  Pain/Comfort:  Pain Rating 1: other (see comments) (did not  quantify)  Location 1: back  Pain Addressed 1: Reposition, Distraction, Cessation of Activity      Objective:     Communicated with RN prior to session.  Patient found HOB elevated with peripheral IV, telemetry, bed alarm, mcdaniels catheter upon PT entry to room.     General Precautions: Standard, fall  Orthopedic Precautions: N/A  Braces: N/A  Respiratory Status: Room air     Functional Mobility:  Bed Mobility:     Supine to Sit: maximal assistance and of 2 persons  Sit to Supine: maximal assistance and of 2 persons      AM-PAC 6 CLICK MOBILITY  Turning over in bed (including adjusting bedclothes, sheets and blankets)?: 2  Sitting down on and standing up from a chair with arms (e.g., wheelchair, bedside commode, etc.): 1  Moving from lying on back to sitting on the side of the bed?: 1  Moving to and from a bed to a chair (including a wheelchair)?: 1  Need to walk in hospital room?: 1  Climbing 3-5 steps with a railing?: 1  Basic Mobility Total Score: 7       Treatment & Education:  Pt educated on POC, discharge recommendation, proper form with mobility, importance of time OOB, midline seated positioning, sitting balance, need for assist with mobility, use of call bell to seek assistance as needed and fall prevention      Patient left HOB elevated with all lines intact, call button in reach, bed alarm on, and family/RN notified..    GOALS:   Multidisciplinary Problems       Physical Therapy Goals          Problem: Physical Therapy    Goal Priority Disciplines Outcome Goal Variances Interventions   Physical Therapy Goal     PT, PT/OT      Description: Goals to be met by: 23     Patient will increase functional independence with mobility by performin. Supine to sit with Maximum Assistance  2. Rolling to Left and Right with Moderate Assistance.  3. Lower extremity exercise program x20 reps per handout, with assistance as needed                         Time Tracking:     PT Received On: 23  PT Start Time:  0922     PT Stop Time: 0945  PT Total Time (min): 23 min     Billable Minutes: Therapeutic Activity 23    Treatment Type: Treatment  PT/PTA: PT     Number of PTA visits since last PT visit: 0     04/28/2023

## 2023-04-29 PROBLEM — B86 SCABIES INFESTATION: Status: ACTIVE | Noted: 2023-04-29

## 2023-04-29 LAB
BACTERIA TISS AEROBE CULT: NORMAL
GRAM STN SPEC: NORMAL
GRAM STN SPEC: NORMAL

## 2023-04-30 DIAGNOSIS — I87.2 VENOUS STASIS DERMATITIS OF BOTH LOWER EXTREMITIES: ICD-10-CM

## 2023-05-01 RX ORDER — TRIAMCINOLONE ACETONIDE 1 MG/G
CREAM TOPICAL
Qty: 80 G | Refills: 3 | Status: ON HOLD | OUTPATIENT
Start: 2023-05-01 | End: 2023-07-21 | Stop reason: HOSPADM

## 2023-05-01 NOTE — ANESTHESIA POSTPROCEDURE EVALUATION
Anesthesia Post Evaluation    Patient: Richard Bustos    Procedure(s) Performed: Procedure(s) (LRB):  DEBRIDEMENT, WOUND, SACRUM (N/A)    Final Anesthesia Type: general      Patient location during evaluation: PACU  Patient participation: Yes- Able to Participate  Level of consciousness: awake and alert and oriented  Post-procedure vital signs: reviewed and stable  Pain management: adequate  Airway patency: patent    PONV status at discharge: No PONV  Anesthetic complications: no      Cardiovascular status: blood pressure returned to baseline, hemodynamically stable and stable  Respiratory status: unassisted, spontaneous ventilation and room air  Hydration status: euvolemic  Follow-up not needed.          Vitals Value Taken Time   /75 05/01/23 0749   Temp 35.9 °C (96.7 °F) 05/01/23 0749   Pulse 72 05/01/23 0749   Resp 18 05/01/23 0749   SpO2 95 % 05/01/23 0749         No case tracking events are documented in the log.      Pain/Chris Score: Pain Rating Prior to Med Admin: 4 (4/30/2023  9:47 PM)  Pain Rating Post Med Admin: 0 (4/30/2023 10:47 PM)

## 2023-05-17 PROBLEM — S39.94XA SCROTAL INJURY: Status: ACTIVE | Noted: 2023-05-17

## 2023-05-19 NOTE — ADDENDUM NOTE
Addendum  created 05/19/23 0953 by Scott Wilson MD    Clinical Note Signed, Intraprocedure Blocks edited, Pend clinical note, SmartForm saved

## 2023-05-22 LAB — FUNGUS SPEC CULT: NORMAL

## 2023-05-24 DIAGNOSIS — E11.9 TYPE 2 DIABETES MELLITUS WITHOUT COMPLICATION: ICD-10-CM

## 2023-05-29 ENCOUNTER — PATIENT OUTREACH (OUTPATIENT)
Dept: ADMINISTRATIVE | Facility: HOSPITAL | Age: 78
End: 2023-05-29
Payer: MEDICARE

## 2023-05-29 ENCOUNTER — PATIENT MESSAGE (OUTPATIENT)
Dept: ADMINISTRATIVE | Facility: HOSPITAL | Age: 78
End: 2023-05-29
Payer: MEDICARE

## 2023-05-29 NOTE — PROGRESS NOTES
Population Health Chart Review & Patient Outreach Details:     Reason for Outreach Encounter:     [x]  Non-Compliant Report   []  Payor Report ([employer], PHN, BCBS, MSSP, MCIP, C, etc.)   []  Pre-Visit Chart Review     Updates Requested / Reviewed:     []  Care Everywhere    []     []  External Sources ([facility], [facility], DIS, etc.)   []  Care Team Updated    Patient Outreach Method:    []  Telephone Outreach Completed   [] Successful   [] Left Voicemail   [] Unable to Contact (wrong number, no voicemail)  []  [employer] Portal Outreach Sent  []  Letter Outreach Mailed  [x]  Fax Sent for External Records  []  External Records Upload    Health Maintenance Topics Addressed and Outreach Outcomes / Actions Taken:        []      Breast Cancer Screening []  Mammo Scheduled      []  External Records Requested     []  Added Reminder to Complete to Upcoming Primary Care Appt Notes     []  Patient Declined     []  Patient Will Call Back to Schedule     []  Patient Will Schedule with External Provider / Order Routed if Applicable             []       Cervical Cancer Screening []  Pap Scheduled      []  External Records Requested     []  Added Reminder to Complete to Upcoming Primary Care Appt Notes     []  Patient Declined     []  Patient Will Call Back to Schedule     []  Patient Will Schedule with External Provider               []          Colorectal Cancer Screening []  Colonoscopy Case Request or Referral Placed     []  External Records Requested     []  Added Reminder to Complete to Upcoming Primary Care Appt Notes     []  Patient Declined     []  Patient Will Call Back to Schedule     []  Patient Will Schedule with External Provider     []  Fit Kit Mailed (add the SmartPhrase under additional notes)     []  Reminded Patient to Complete Home Test             [x]      Diabetic Eye Exam []  Eye Camera Scheduled or Optometry Referral Placed     [x]  External Records Requested     []  Added Reminder to Complete to  Upcoming Primary Care Appt Notes     []  Patient Declined     []  Patient Will Call Back to Schedule     []  Patient Will Schedule with External Provider             []      Blood Pressure Control []  Primary Care Follow Up Visit Scheduled     []  Remote Blood Pressure Reading Captured     []  Added Reminder to Complete to Upcoming Primary Care Appt Notes     []  Patient Declined     []  Patient Will Call Back / Patient Will Send Portal Message with Reading     []  Patient Will Call Back to Schedule Provider Visit             []       HbA1c & Other Labs []  Lab Appt Scheduled for Due Labs     []  Primary Care Follow Up Visit Scheduled      []  Reminded Patient to Complete Home Test     []  Added Reminder to Complete to Upcoming Primary Care Appt Notes     []  Patient Declined     []  Patient Will Call Back to Schedule     []  Patient Will Schedule with External Provider / Order Routed if Applicable           []    Schedule Primary Care Appt []  Primary Care Appt Scheduled     []  Patient Declined     []  Patient Will Call Back to Schedule     []  Pt Established with External Provider & Updated Care Team             []      Medication Adherence []  Primary Care Appointment Scheduled     []  Added Reminder to Upcoming Primary Care Appt Notes     []  Patient Reminded to  Prescription     []  Patient Declined, Provider Notified if Needed     []  Sent Provider Message to Review and/or Add Exclusion to Problem List             []      Osteoporosis Screening []  DXA Appointment Scheduled     []  External Records Requested     []  Added Reminder to Complete to Upcoming Primary Care Appt Notes     []  Patient Declined     []  Patient Will Call Back to Schedule     []  Patient Will Schedule with External Provider / Order Routed if Applicable     Additional Care Coordinator Notes:     IMMUNIZATIONS UPDATED AND REVIEWED    Further Action Needed If Patient Returns Outreach:

## 2023-05-29 NOTE — LETTER
AUTHORIZATION FOR RELEASE OF   CONFIDENTIAL INFORMATION    DR JOEL    We are seeing Richard Bustos, date of birth 1945, in the clinic at [facility]. Familia Ramirez Jr, MD is the patient's PCP. Richard Bustos has an outstanding lab/procedure at the time we reviewed his chart. In order to help keep his health information updated, he has authorized us to request the following medical record(s):        (  )  MAMMOGRAM                                      (  )  COLONOSCOPY      (  )  PAP SMEAR                                          (  )  OUTSIDE LAB RESULTS     (  )  DEXA SCAN                                          ( X )  EYE EXAM            (  )  FOOT EXAM                                          (  )  ENTIRE RECORD     (  )  OUTSIDE IMMUNIZATIONS                 (  )  _______________         Please fax records to [facility], James H Newcomb Jr, MD, [phone]    Thank you in advance,       Tammy MITCHELL  Care Coordinator  [place] [facility] [address] [place] [address]  Phone [phone]  Fax [phone]           Patient Name: Richard Bustos  : 1945  Patient Phone #: [phone]

## 2023-05-31 PROBLEM — R65.21 SEPTIC SHOCK: Status: RESOLVED | Noted: 2022-01-06 | Resolved: 2023-05-31

## 2023-05-31 PROBLEM — B86 SCABIES INFESTATION: Status: RESOLVED | Noted: 2023-04-29 | Resolved: 2023-05-31

## 2023-05-31 PROBLEM — A41.9 SEPTIC SHOCK: Status: RESOLVED | Noted: 2022-01-06 | Resolved: 2023-05-31

## 2023-06-02 PROBLEM — R31.9 HEMATURIA: Status: ACTIVE | Noted: 2023-06-02

## 2023-06-03 LAB
ACID FAST MOD KINY STN SPEC: NORMAL
MYCOBACTERIUM SPEC QL CULT: NORMAL

## 2023-06-05 ENCOUNTER — HOSPITAL ENCOUNTER (INPATIENT)
Facility: HOSPITAL | Age: 78
LOS: 7 days | Discharge: NURSING, REHAB OR LONG-TERM CARE | DRG: 579 | End: 2023-06-12
Attending: EMERGENCY MEDICINE | Admitting: FAMILY MEDICINE
Payer: MEDICARE

## 2023-06-05 DIAGNOSIS — I48.0 PAROXYSMAL ATRIAL FIBRILLATION: ICD-10-CM

## 2023-06-05 DIAGNOSIS — R31.0 GROSS HEMATURIA: ICD-10-CM

## 2023-06-05 DIAGNOSIS — R07.9 CHEST PAIN: ICD-10-CM

## 2023-06-05 DIAGNOSIS — R31.9 HEMATURIA: ICD-10-CM

## 2023-06-05 DIAGNOSIS — B37.7 CANDIDEMIA: ICD-10-CM

## 2023-06-05 DIAGNOSIS — L89.154 PRESSURE INJURY OF SACRAL REGION, STAGE 4: ICD-10-CM

## 2023-06-05 DIAGNOSIS — M86.9 OSTEOMYELITIS: ICD-10-CM

## 2023-06-05 DIAGNOSIS — Z74.01 BEDBOUND: Chronic | ICD-10-CM

## 2023-06-05 DIAGNOSIS — L89.892 PRESSURE INJURY OF OTHER SITE, STAGE 2: Chronic | ICD-10-CM

## 2023-06-05 DIAGNOSIS — K66.8 PNEUMOPERITONEUM OF UNKNOWN ETIOLOGY: Primary | ICD-10-CM

## 2023-06-05 DIAGNOSIS — Z15.89 MTHFR MUTATION (METHYLENETETRAHYDROFOLATE REDUCTASE): ICD-10-CM

## 2023-06-05 DIAGNOSIS — Z51.89 WOUND CHECK, ABSCESS: ICD-10-CM

## 2023-06-05 PROBLEM — I50.20 HFREF (HEART FAILURE WITH REDUCED EJECTION FRACTION): Status: ACTIVE | Noted: 2023-06-05

## 2023-06-05 LAB
ALBUMIN SERPL BCP-MCNC: 2.4 G/DL (ref 3.5–5.2)
ALP SERPL-CCNC: 292 U/L (ref 55–135)
ALT SERPL W/O P-5'-P-CCNC: 51 U/L (ref 10–44)
ANION GAP SERPL CALC-SCNC: 7 MMOL/L (ref 8–16)
AST SERPL-CCNC: 55 U/L (ref 10–40)
BACTERIA #/AREA URNS HPF: NEGATIVE /HPF
BASOPHILS # BLD AUTO: 0.04 K/UL (ref 0–0.2)
BASOPHILS NFR BLD: 0.3 % (ref 0–1.9)
BILIRUB SERPL-MCNC: 0.4 MG/DL (ref 0.1–1)
BILIRUB UR QL STRIP: NEGATIVE
BUN SERPL-MCNC: 40 MG/DL (ref 8–23)
CALCIUM SERPL-MCNC: 9.2 MG/DL (ref 8.7–10.5)
CHLORIDE SERPL-SCNC: 101 MMOL/L (ref 95–110)
CLARITY UR: ABNORMAL
CO2 SERPL-SCNC: 27 MMOL/L (ref 23–29)
COLOR UR: ABNORMAL
CREAT SERPL-MCNC: 1.4 MG/DL (ref 0.5–1.4)
CRP SERPL-MCNC: 6.79 MG/DL
DIFFERENTIAL METHOD: ABNORMAL
EOSINOPHIL # BLD AUTO: 0.1 K/UL (ref 0–0.5)
EOSINOPHIL NFR BLD: 0.5 % (ref 0–8)
ERYTHROCYTE [DISTWIDTH] IN BLOOD BY AUTOMATED COUNT: 17 % (ref 11.5–14.5)
ERYTHROCYTE [SEDIMENTATION RATE] IN BLOOD BY WESTERGREN METHOD: 81 MM/HR (ref 0–10)
EST. GFR  (NO RACE VARIABLE): 52.4 ML/MIN/1.73 M^2
GLUCOSE SERPL-MCNC: 122 MG/DL (ref 70–110)
GLUCOSE UR QL STRIP: NEGATIVE
HCT VFR BLD AUTO: 26.2 % (ref 40–54)
HGB BLD-MCNC: 8.7 G/DL (ref 14–18)
HGB UR QL STRIP: ABNORMAL
HYALINE CASTS #/AREA URNS LPF: 219 /LPF
IMM GRANULOCYTES # BLD AUTO: 0.13 K/UL (ref 0–0.04)
IMM GRANULOCYTES NFR BLD AUTO: 0.9 % (ref 0–0.5)
KETONES UR QL STRIP: NEGATIVE
LACTATE SERPL-SCNC: 0.9 MMOL/L (ref 0.5–1.9)
LEUKOCYTE ESTERASE UR QL STRIP: ABNORMAL
LYMPHOCYTES # BLD AUTO: 1 K/UL (ref 1–4.8)
LYMPHOCYTES NFR BLD: 6.9 % (ref 18–48)
MAGNESIUM SERPL-MCNC: 1.8 MG/DL (ref 1.6–2.6)
MCH RBC QN AUTO: 32.2 PG (ref 27–31)
MCHC RBC AUTO-ENTMCNC: 33.2 G/DL (ref 32–36)
MCV RBC AUTO: 97 FL (ref 82–98)
MICROSCOPIC COMMENT: ABNORMAL
MONOCYTES # BLD AUTO: 1.1 K/UL (ref 0.3–1)
MONOCYTES NFR BLD: 7.3 % (ref 4–15)
NEUTROPHILS # BLD AUTO: 12.4 K/UL (ref 1.8–7.7)
NEUTROPHILS NFR BLD: 84.1 % (ref 38–73)
NITRITE UR QL STRIP: NEGATIVE
NRBC BLD-RTO: 0 /100 WBC
PH UR STRIP: 6 [PH] (ref 5–8)
PHOSPHATE SERPL-MCNC: 4.5 MG/DL (ref 2.7–4.5)
PLATELET # BLD AUTO: 457 K/UL (ref 150–450)
PMV BLD AUTO: 9.8 FL (ref 9.2–12.9)
POTASSIUM SERPL-SCNC: 4.5 MMOL/L (ref 3.5–5.1)
PROCALCITONIN SERPL IA-MCNC: 0.73 NG/ML (ref 0–0.5)
PROT SERPL-MCNC: 6.5 G/DL (ref 6–8.4)
PROT UR QL STRIP: ABNORMAL
RBC # BLD AUTO: 2.7 M/UL (ref 4.6–6.2)
RBC #/AREA URNS HPF: >100 /HPF (ref 0–4)
SODIUM SERPL-SCNC: 135 MMOL/L (ref 136–145)
SP GR UR STRIP: 1.01 (ref 1–1.03)
SQUAMOUS #/AREA URNS HPF: 4 /HPF
TROPONIN I SERPL HS-MCNC: 21.1 PG/ML (ref 0–14.9)
TROPONIN I SERPL HS-MCNC: 22.1 PG/ML (ref 0–14.9)
URN SPEC COLLECT METH UR: ABNORMAL
UROBILINOGEN UR STRIP-ACNC: NEGATIVE EU/DL
WBC # BLD AUTO: 14.71 K/UL (ref 3.9–12.7)
WBC #/AREA URNS HPF: 42 /HPF (ref 0–5)

## 2023-06-05 PROCEDURE — 87154 CUL TYP ID BLD PTHGN 6+ TRGT: CPT | Performed by: NURSE PRACTITIONER

## 2023-06-05 PROCEDURE — 12000002 HC ACUTE/MED SURGE SEMI-PRIVATE ROOM

## 2023-06-05 PROCEDURE — 87077 CULTURE AEROBIC IDENTIFY: CPT | Performed by: NURSE PRACTITIONER

## 2023-06-05 PROCEDURE — 84100 ASSAY OF PHOSPHORUS: CPT | Performed by: NURSE PRACTITIONER

## 2023-06-05 PROCEDURE — 86140 C-REACTIVE PROTEIN: CPT | Performed by: NURSE PRACTITIONER

## 2023-06-05 PROCEDURE — 83735 ASSAY OF MAGNESIUM: CPT | Performed by: NURSE PRACTITIONER

## 2023-06-05 PROCEDURE — 85651 RBC SED RATE NONAUTOMATED: CPT | Performed by: NURSE PRACTITIONER

## 2023-06-05 PROCEDURE — 87186 SC STD MICRODIL/AGAR DIL: CPT | Performed by: NURSE PRACTITIONER

## 2023-06-05 PROCEDURE — 36415 COLL VENOUS BLD VENIPUNCTURE: CPT | Performed by: NURSE PRACTITIONER

## 2023-06-05 PROCEDURE — 84484 ASSAY OF TROPONIN QUANT: CPT | Mod: 91 | Performed by: NURSE PRACTITIONER

## 2023-06-05 PROCEDURE — 80053 COMPREHEN METABOLIC PANEL: CPT | Performed by: NURSE PRACTITIONER

## 2023-06-05 PROCEDURE — 87086 URINE CULTURE/COLONY COUNT: CPT | Performed by: NURSE PRACTITIONER

## 2023-06-05 PROCEDURE — 25000003 PHARM REV CODE 250: Performed by: EMERGENCY MEDICINE

## 2023-06-05 PROCEDURE — 96365 THER/PROPH/DIAG IV INF INIT: CPT

## 2023-06-05 PROCEDURE — 84484 ASSAY OF TROPONIN QUANT: CPT | Performed by: EMERGENCY MEDICINE

## 2023-06-05 PROCEDURE — 25500020 PHARM REV CODE 255: Performed by: EMERGENCY MEDICINE

## 2023-06-05 PROCEDURE — 81001 URINALYSIS AUTO W/SCOPE: CPT | Performed by: NURSE PRACTITIONER

## 2023-06-05 PROCEDURE — 96367 TX/PROPH/DG ADDL SEQ IV INF: CPT

## 2023-06-05 PROCEDURE — 83605 ASSAY OF LACTIC ACID: CPT | Performed by: NURSE PRACTITIONER

## 2023-06-05 PROCEDURE — 85025 COMPLETE CBC W/AUTO DIFF WBC: CPT | Performed by: NURSE PRACTITIONER

## 2023-06-05 PROCEDURE — 99285 EMERGENCY DEPT VISIT HI MDM: CPT | Mod: 25

## 2023-06-05 PROCEDURE — 84145 PROCALCITONIN (PCT): CPT | Performed by: NURSE PRACTITIONER

## 2023-06-05 PROCEDURE — 87040 BLOOD CULTURE FOR BACTERIA: CPT | Performed by: NURSE PRACTITIONER

## 2023-06-05 PROCEDURE — 63600175 PHARM REV CODE 636 W HCPCS: Performed by: EMERGENCY MEDICINE

## 2023-06-05 PROCEDURE — 96366 THER/PROPH/DIAG IV INF ADDON: CPT

## 2023-06-05 RX ORDER — NALOXONE HCL 0.4 MG/ML
0.02 VIAL (ML) INJECTION
Status: DISCONTINUED | OUTPATIENT
Start: 2023-06-06 | End: 2023-06-12 | Stop reason: HOSPADM

## 2023-06-05 RX ORDER — MECOBAL/LEVOMEFOLAT CA/B6 PHOS 2-3-35 MG
1 TABLET ORAL DAILY
Status: ON HOLD | COMMUNITY
End: 2023-07-21 | Stop reason: HOSPADM

## 2023-06-05 RX ORDER — SIMETHICONE 80 MG
1 TABLET,CHEWABLE ORAL 4 TIMES DAILY PRN
Status: DISCONTINUED | OUTPATIENT
Start: 2023-06-06 | End: 2023-06-12 | Stop reason: HOSPADM

## 2023-06-05 RX ORDER — VANCOMYCIN HCL IN 5 % DEXTROSE 1G/250ML
1000 PLASTIC BAG, INJECTION (ML) INTRAVENOUS ONCE
Status: COMPLETED | OUTPATIENT
Start: 2023-06-05 | End: 2023-06-05

## 2023-06-05 RX ORDER — OMEPRAZOLE 20 MG/1
20 CAPSULE, DELAYED RELEASE ORAL DAILY
Status: ON HOLD | COMMUNITY
Start: 2023-03-29 | End: 2023-07-21 | Stop reason: HOSPADM

## 2023-06-05 RX ORDER — ATORVASTATIN CALCIUM 40 MG/1
80 TABLET, FILM COATED ORAL NIGHTLY
Status: DISCONTINUED | OUTPATIENT
Start: 2023-06-05 | End: 2023-06-06

## 2023-06-05 RX ORDER — POLYETHYLENE GLYCOL 3350 17 G/17G
17 POWDER, FOR SOLUTION ORAL 2 TIMES DAILY PRN
Status: DISCONTINUED | OUTPATIENT
Start: 2023-06-06 | End: 2023-06-12 | Stop reason: HOSPADM

## 2023-06-05 RX ORDER — IPRATROPIUM BROMIDE AND ALBUTEROL SULFATE 2.5; .5 MG/3ML; MG/3ML
3 SOLUTION RESPIRATORY (INHALATION) EVERY 4 HOURS PRN
Status: DISCONTINUED | OUTPATIENT
Start: 2023-06-06 | End: 2023-06-12 | Stop reason: HOSPADM

## 2023-06-05 RX ORDER — MORPHINE SULFATE 4 MG/ML
4 INJECTION, SOLUTION INTRAMUSCULAR; INTRAVENOUS EVERY 4 HOURS PRN
Status: DISCONTINUED | OUTPATIENT
Start: 2023-06-06 | End: 2023-06-06

## 2023-06-05 RX ORDER — AMIODARONE HYDROCHLORIDE 200 MG/1
200 TABLET ORAL 2 TIMES DAILY
Status: DISCONTINUED | OUTPATIENT
Start: 2023-06-05 | End: 2023-06-12 | Stop reason: HOSPADM

## 2023-06-05 RX ORDER — ONDANSETRON 2 MG/ML
4 INJECTION INTRAMUSCULAR; INTRAVENOUS EVERY 6 HOURS PRN
Status: DISCONTINUED | OUTPATIENT
Start: 2023-06-06 | End: 2023-06-12 | Stop reason: HOSPADM

## 2023-06-05 RX ORDER — CLOPIDOGREL BISULFATE 75 MG/1
1 TABLET ORAL DAILY
Status: ON HOLD | COMMUNITY
Start: 2023-03-29 | End: 2023-07-21 | Stop reason: HOSPADM

## 2023-06-05 RX ORDER — ASPIRIN 81 MG/1
1 TABLET ORAL
Status: ON HOLD | COMMUNITY
Start: 2023-03-29 | End: 2023-06-08 | Stop reason: HOSPADM

## 2023-06-05 RX ORDER — SODIUM CHLORIDE 0.9 % (FLUSH) 0.9 %
10 SYRINGE (ML) INJECTION
Status: DISCONTINUED | OUTPATIENT
Start: 2023-06-06 | End: 2023-06-12 | Stop reason: HOSPADM

## 2023-06-05 RX ORDER — TRAZODONE HYDROCHLORIDE 50 MG/1
50 TABLET ORAL NIGHTLY
Status: DISCONTINUED | OUTPATIENT
Start: 2023-06-05 | End: 2023-06-07

## 2023-06-05 RX ORDER — INSULIN ASPART 100 [IU]/ML
INJECTION, SOLUTION INTRAVENOUS; SUBCUTANEOUS
Status: ON HOLD | COMMUNITY
Start: 2023-04-11 | End: 2023-07-21 | Stop reason: HOSPADM

## 2023-06-05 RX ORDER — SODIUM CHLORIDE 9 MG/ML
INJECTION, SOLUTION INTRAVENOUS CONTINUOUS
Status: DISCONTINUED | OUTPATIENT
Start: 2023-06-06 | End: 2023-06-06

## 2023-06-05 RX ORDER — GLUCAGON 1 MG
1 KIT INJECTION
Status: DISCONTINUED | OUTPATIENT
Start: 2023-06-06 | End: 2023-06-12 | Stop reason: HOSPADM

## 2023-06-05 RX ORDER — ACETAMINOPHEN 325 MG/1
650 TABLET ORAL EVERY 8 HOURS PRN
Status: DISCONTINUED | OUTPATIENT
Start: 2023-06-06 | End: 2023-06-12 | Stop reason: HOSPADM

## 2023-06-05 RX ORDER — GUAIFENESIN 600 MG/1
600 TABLET, EXTENDED RELEASE ORAL 2 TIMES DAILY
Status: DISCONTINUED | OUTPATIENT
Start: 2023-06-05 | End: 2023-06-12 | Stop reason: HOSPADM

## 2023-06-05 RX ORDER — CICLOPIROX 80 MG/ML
1 SOLUTION TOPICAL NIGHTLY
Status: ON HOLD | COMMUNITY
Start: 2023-04-10 | End: 2023-07-21 | Stop reason: HOSPADM

## 2023-06-05 RX ORDER — AMOXICILLIN 250 MG
1 CAPSULE ORAL 2 TIMES DAILY PRN
Status: DISCONTINUED | OUTPATIENT
Start: 2023-06-06 | End: 2023-06-12 | Stop reason: HOSPADM

## 2023-06-05 RX ORDER — PANTOPRAZOLE SODIUM 40 MG/1
40 TABLET, DELAYED RELEASE ORAL DAILY
Status: DISCONTINUED | OUTPATIENT
Start: 2023-06-06 | End: 2023-06-08

## 2023-06-05 RX ORDER — LISINOPRIL 40 MG/1
40 TABLET ORAL DAILY
Status: ON HOLD | COMMUNITY
Start: 2023-04-27 | End: 2023-07-21 | Stop reason: HOSPADM

## 2023-06-05 RX ORDER — IBUPROFEN 200 MG
16 TABLET ORAL
Status: DISCONTINUED | OUTPATIENT
Start: 2023-06-06 | End: 2023-06-12 | Stop reason: HOSPADM

## 2023-06-05 RX ORDER — HYDROCODONE BITARTRATE AND ACETAMINOPHEN 5; 325 MG/1; MG/1
1 TABLET ORAL EVERY 4 HOURS PRN
Status: DISCONTINUED | OUTPATIENT
Start: 2023-06-06 | End: 2023-06-12 | Stop reason: HOSPADM

## 2023-06-05 RX ORDER — FLUTICASONE PROPIONATE 50 MCG
1 SPRAY, SUSPENSION (ML) NASAL DAILY
Status: ON HOLD | COMMUNITY
Start: 2023-03-29 | End: 2023-07-21 | Stop reason: HOSPADM

## 2023-06-05 RX ORDER — TRAZODONE HYDROCHLORIDE 50 MG/1
1 TABLET ORAL NIGHTLY
Status: ON HOLD | COMMUNITY
Start: 2023-04-17 | End: 2023-07-21 | Stop reason: HOSPADM

## 2023-06-05 RX ORDER — ALLOPURINOL 100 MG/1
100 TABLET ORAL NIGHTLY
Status: DISCONTINUED | OUTPATIENT
Start: 2023-06-05 | End: 2023-06-12 | Stop reason: HOSPADM

## 2023-06-05 RX ORDER — INSULIN ASPART 100 [IU]/ML
1-10 INJECTION, SOLUTION INTRAVENOUS; SUBCUTANEOUS
Status: DISCONTINUED | OUTPATIENT
Start: 2023-06-06 | End: 2023-06-12 | Stop reason: HOSPADM

## 2023-06-05 RX ORDER — FAMOTIDINE 20 MG/1
40 TABLET, FILM COATED ORAL DAILY
Status: DISCONTINUED | OUTPATIENT
Start: 2023-06-06 | End: 2023-06-05

## 2023-06-05 RX ORDER — TALC
6 POWDER (GRAM) TOPICAL NIGHTLY PRN
Status: DISCONTINUED | OUTPATIENT
Start: 2023-06-06 | End: 2023-06-12 | Stop reason: HOSPADM

## 2023-06-05 RX ORDER — NITROGLYCERIN 400 UG/1
1 SPRAY ORAL EVERY 5 MIN PRN
Status: ON HOLD | COMMUNITY
Start: 2023-03-28 | End: 2023-07-21 | Stop reason: HOSPADM

## 2023-06-05 RX ORDER — FAMOTIDINE 20 MG/1
20 TABLET, FILM COATED ORAL DAILY
Status: DISCONTINUED | OUTPATIENT
Start: 2023-06-06 | End: 2023-06-05

## 2023-06-05 RX ORDER — IBUPROFEN 200 MG
24 TABLET ORAL
Status: DISCONTINUED | OUTPATIENT
Start: 2023-06-06 | End: 2023-06-12 | Stop reason: HOSPADM

## 2023-06-05 RX ADMIN — SODIUM CHLORIDE, SODIUM LACTATE, POTASSIUM CHLORIDE, AND CALCIUM CHLORIDE 2052 ML: .6; .31; .03; .02 INJECTION, SOLUTION INTRAVENOUS at 10:06

## 2023-06-05 RX ADMIN — CEFEPIME HYDROCHLORIDE 2 G: 2 INJECTION, POWDER, FOR SOLUTION INTRAVENOUS at 05:06

## 2023-06-05 RX ADMIN — VANCOMYCIN HYDROCHLORIDE 1000 MG: 1 INJECTION, POWDER, LYOPHILIZED, FOR SOLUTION INTRAVENOUS at 07:06

## 2023-06-05 RX ADMIN — VANCOMYCIN HYDROCHLORIDE 1000 MG: 1 INJECTION, POWDER, LYOPHILIZED, FOR SOLUTION INTRAVENOUS at 10:06

## 2023-06-05 RX ADMIN — IOHEXOL 100 ML: 350 INJECTION, SOLUTION INTRAVENOUS at 08:06

## 2023-06-05 NOTE — ED PROVIDER NOTES
Encounter Date: 6/5/2023       History     Chief Complaint   Patient presents with    Wound Check     HPI    Seen and evaluated.  Presents with a chief complaint of worsening pressure ulcers.  Patient has bilateral lower extremity pressure ulcers with purulence coming from the heel wound on the right lower extremity.  Additionally, he has a large deep invading wound to the sacrum.  These are subacute wounds that are ongoing.  No clear alleviating factors.  Denies additional complaints.    Review of patient's allergies indicates:   Allergen Reactions    Donepezil Other (See Comments)     AMS    Bactroban [mupirocin calcium] Blisters     Causes Blisters    Shellfish containing products Other (See Comments)     Other reaction(s): Gout  OYSTERS     Past Medical History:   Diagnosis Date    Anemia     Anemia of other chronic disease 09/13/2017    Anemia, chronic renal failure 09/13/2017    Anemia, unspecified 09/13/2017    Anticoagulant long-term use     Aorta aneurysm     Arthritis     Atrial fibrillation     Bacteremia due to Streptococcus 01/08/2022    CAD (coronary artery disease)     CHF (congestive heart failure)     Chronic kidney disease     Clotting disorder 09/13/2017    Colon polyp     Dementia     Diabetes mellitus     not on meds    Diabetes mellitus type II     Diverticulosis     Elevated PSA     Encounter for blood transfusion     Former smoker     General anesthetics causing adverse effect in therapeutic use     TROUBLE COMING OUT OF ANESTHESIA WITH GASTRIC BYPASS    GERD (gastroesophageal reflux disease)     Gout     Hx of colonic polyps     Hypercoagulable state     Hyperlipidemia     Hypertension     MI, old 2010    MTHFR mutation     Myocardial infarction     9/2010    Osteomyelitis of ankle or foot 03/09/2017    Prostate cancer 06/2016    Sleep apnea     Squamous cell carcinoma 01/23/2018    Left helix, imiquimod    Supratherapeutic INR 4/19/2023    Venous stasis     Chronic     Past Surgical  History:   Procedure Laterality Date    ABDOMINAL SURGERY      CARDIAC SURGERY      3 STENTS     CAUDAL EPIDURAL STEROID INJECTION N/A 6/22/2020    Procedure: INJECTION, STEROID, SPINE, EPIDURAL, CAUDAL;  Surgeon: Evangelist Bose MD;  Location: [facility] OR;  Service: Pain Management;  Laterality: N/A;    COLONOSCOPY  02/2011    diverticulosis with diverticula with clot, no active bleeding    COLONOSCOPY N/A 8/24/2016    Procedure: COLONOSCOPY;  Surgeon: Saroj Borjas MD;  Location: [facility] ENDO;  Service: Endoscopy;  Laterality: N/A;    COLONOSCOPY N/A 11/18/2020    Procedure: COLONOSCOPY;  Surgeon: Saroj Borjas MD;  Location: [facility] END[facility];  Service: Endoscopy;  Laterality: N/A;    COLONOSCOPY N/A 10/27/2021    Procedure: COLONOSCOPY;  Surgeon: Saroj Borjas MD;  Location: [facility] ENDO;  Service: Endoscopy;  Laterality: N/A;    CREATION, COLOSTOMY, LAPAROSCOPIC N/A 4/25/2023    Procedure: CREATION, COLOSTOMY, LAPAROSCOPIC;  Surgeon: Mark Martinez Jr., MD;  Location: [facility] OR;  Service: General;  Laterality: N/A;    DEBRIDEMENT OF SACRAL WOUND N/A 4/20/2023    Procedure: DEBRIDEMENT, WOUND, SACRUM;  Surgeon: Mark Martinez Jr., MD;  Location: [facility] OR;  Service: General;  Laterality: N/A;    EPIDURAL STEROID INJECTION INTO LUMBAR SPINE N/A 6/22/2020    Procedure: Injection-steroid-epidural-lumbar;  Surgeon: Evangelist Bose MD;  Location: [facility] OR;  Service: Pain Management;  Laterality: N/A;  L3 L4 L5 S1    EPIDURAL STEROID INJECTION INTO LUMBAR SPINE N/A 9/21/2020    Procedure: Injection-steroid-epidural-lumbar;  Surgeon: Evangelist Bose MD;  Location: [facility] OR;  Service: Pain Management;  Laterality: N/A;  L5-S1    FUSION, SPINE, CERVICAL N/A 3/24/2023    Procedure: FUSION, SPINE, CERVICAL;  Surgeon: Kike Kc DO;  Location: [facility];  Service: Neurosurgery;  Laterality: N/A;  posterior cervical decompression/fusion C3-T1    GASTRIC BYPASS  2/5/2008    IVC FILTER RETRIEVAL      JOINT REPLACEMENT   1996 and 2001    bi-lat hip replacement/Rt Hip and Lt Hip    RADIOFREQUENCY ABLATION OF LUMBAR MEDIAL BRANCH NERVE AT SINGLE LEVEL Bilateral 1/10/2020    Procedure: Radiofrequency Ablation, Nerve, Spinal, Lumbar, Medial Branch, 1 Level;  Surgeon: Evangelist Bose MD;  Location: [facility] OR;  Service: Pain Management;  Laterality: Bilateral;  L3,L4,L5    RADIOFREQUENCY ABLATION OF LUMBAR MEDIAL BRANCH NERVE AT SINGLE LEVEL Bilateral 11/23/2021    Procedure: Radiofrequency Ablation, Nerve, Spinal, Lumbar, Medial Branch, Bilateral L 3,4,5;  Surgeon: Nile Causey MD;  Location: [facility] OR;  Service: Pain Management;  Laterality: Bilateral;    ROTATOR CUFF REPAIR      Rt shoulder    Stents  8/18/2010    x 3    UPPER GASTROINTESTINAL ENDOSCOPY  02/2011     Family History   Problem Relation Age of Onset    Heart attack Mother     Heart attack Father     Heart attack Brother     Ulcerative colitis Daughter 35    Lupus Daughter     Colon cancer Neg Hx     Colon polyps Neg Hx     Crohn's disease Neg Hx     Melanoma Neg Hx     Psoriasis Neg Hx     Eczema Neg Hx      Social History     Tobacco Use    Smoking status: Former    Smokeless tobacco: Never    Tobacco comments:     47 yrs ago, only smoked 3-4 packs in his entire life as a teenager   Substance Use Topics    Alcohol use: Not Currently     Comment: rare    Drug use: No     Review of Systems   Constitutional:  Negative for fever.   Respiratory:  Negative for shortness of breath.    Cardiovascular:  Negative for chest pain.   Gastrointestinal:  Negative for nausea.   Genitourinary:  Negative for dysuria.   Musculoskeletal:  Negative for back pain.   Skin:  Positive for wound.   Neurological:  Negative for weakness.   Psychiatric/Behavioral:  Negative for confusion.    All other systems reviewed and are negative.    Physical Exam     Initial Vitals [06/05/23 1629]   BP Pulse Resp Temp SpO2   134/74 81 (!) 22 98.8 °F (37.1 °C) 97 %      MAP       --         Physical Exam    Nursing  note and vitals reviewed.  Constitutional: He appears well-developed and well-nourished.   HENT:   Head: Normocephalic and atraumatic.   Eyes: Conjunctivae are normal.   Cardiovascular:  Normal rate and regular rhythm.           Pulmonary/Chest: Effort normal.   Abdominal: Abdomen is soft.   Musculoskeletal:         General: Normal range of motion.     Neurological: He is alert and oriented to person, place, and time.   Skin: Skin is warm and dry.   Psychiatric: He has a normal mood and affect. His speech is normal.                                   ED Course   Procedures  Labs Reviewed   CBC W/ AUTO DIFFERENTIAL - Abnormal; Notable for the following components:       Result Value    WBC 14.71 (*)     RBC 2.70 (*)     Hemoglobin 8.7 (*)     Hematocrit 26.2 (*)     MCH 32.2 (*)     RDW 17.0 (*)     Platelets 457 (*)     Immature Granulocytes 0.9 (*)     Gran # (ANC) 12.4 (*)     Immature Grans (Abs) 0.13 (*)     Mono # 1.1 (*)     Gran % 84.1 (*)     Lymph % 6.9 (*)     All other components within normal limits   COMPREHENSIVE METABOLIC PANEL - Abnormal; Notable for the following components:    Sodium 135 (*)     Glucose 122 (*)     BUN 40 (*)     Albumin 2.4 (*)     Alkaline Phosphatase 292 (*)     AST 55 (*)     ALT 51 (*)     Anion Gap 7 (*)     eGFR 52.4 (*)     All other components within normal limits   URINALYSIS, REFLEX TO URINE CULTURE - Abnormal; Notable for the following components:    Color, UA Red (*)     Appearance, UA Cloudy (*)     Protein, UA 2+ (*)     Occult Blood UA 3+ (*)     Leukocytes, UA 1+ (*)     All other components within normal limits    Narrative:     Specimen Source->Urine   TROPONIN I HIGH SENSITIVITY - Abnormal; Notable for the following components:    Troponin I High Sensitivity 21.1 (*)     All other components within normal limits   PROCALCITONIN - Abnormal; Notable for the following components:    Procalcitonin 0.73 (*)     All other components within normal limits   SEDIMENTATION  RATE - Abnormal; Notable for the following components:    Sed Rate 81 (*)     All other components within normal limits   C-REACTIVE PROTEIN - Abnormal; Notable for the following components:    CRP 6.79 (*)     All other components within normal limits   URINALYSIS MICROSCOPIC - Abnormal; Notable for the following components:    RBC, UA >100 (*)     WBC, UA 42 (*)     Hyaline Casts,  (*)     All other components within normal limits    Narrative:     Specimen Source->Urine   TROPONIN I HIGH SENSITIVITY - Abnormal; Notable for the following components:    Troponin I High Sensitivity 22.1 (*)     All other components within normal limits   CULTURE, BLOOD    Narrative:     Aerobic and anaerobic   CULTURE, BLOOD    Narrative:     Aerobic and anaerobic   CULTURE, URINE   CULTURE, AEROBIC  (SPECIFY SOURCE)   MAGNESIUM   PHOSPHORUS   LACTIC ACID, PLASMA   PROCALCITONIN   SEDIMENTATION RATE   C-REACTIVE PROTEIN   HEMOGLOBIN A1C          Imaging Results              US Retroperitoneal Complete (In process)                      CTA Chest Non-Coronary (PE Studies) (Final result)  Result time 06/05/23 21:57:33      Final result by Maximiliano Martin MD (06/05/23 21:57:33)                   Narrative:    EXAMINATION(S):  CT CHEST ANGIOGRAPHY WITH IV CONTRAST    COMPARISON: Portable chest x-ray June 5, 2023.    INDICATION: Pulmonary embolism (PE) suspected, high prob    TECHNIQUE: CT angiography of the chest was performed with the administration of intravenous contrast media in order to evaluate the pulmonary arteries for pulmonary embolus. A 100 mL Omnipaque 350 bolus injection of water soluble contrast was administered intravenously followed by thin section cuts through the chest. This was followed by imaging post-processing with 3-D reconstruction of the pulmonary arteries in the coronal and sagittal planes with images stored in the patient's permanent medical record. All CT scans at this facility use dose modulation,  iterative reconstruction and/or weight based dosing when appropriate to reduce radiation dose to as low as reasonably achievable.    FINDING(S):  Pulmonary arteries: The contrast bolus is diagnostic. No pulmonary emboli are identified. No right heart strain is evident.  Thoracic aorta: Thoracic aortic atherosclerosis is noted. Mild aneurysmal dilation of the ascending thoracic aorta measures 4.4 cm transverse at the main pulmonary artery level. The thoracic aorta tapers down to a normal caliber at the aortic arch level. No aortic dissection is evident.  Heart: The heart is enlarged. A small pericardial effusion is noted at the anterior base. Coronary artery atherosclerotic calcification and/or stent graft artifact is present.  Mediastinum and hilum: No mediastinal or hilar mass or pathologic lymph node enlargement is evident.  Pulmonary: The trachea and major central airways appear patent. Small to moderate sized bilateral posterior layering pleural effusions are associated with moderate compressive atelectasis. No pulmonary edema is evident. No pneumothorax is evident.  Bones: No acute bony pathology is evident. Cervical thoracic junction laminectomy and posterior fusion changes are noted along with multilevel degenerative changes.  Body wall and axilla: No acute body wall pathology. A left-sided PICC line is noted.    IMPRESSION:    1. No evidence of acute pulmonary emboli.    2. Mild aneurysmal dilation of the ascending thoracic aorta measures 4.4 cm.    3. A small pericardial effusion is present.    4. Bilateral posterior layering pleural effusions are associated with bibasilar compressive atelectasis. Superimposed pneumonia is not excluded.    Electronically signed by:  Maximiliano Martin MD  6/5/2023 9:57 PM CDT Workstation: [id number]                                     CT Abdomen Pelvis With Contrast (Final result)  Result time 06/05/23 21:52:30      Final result by Maximiliano Martin MD (06/05/23 21:52:30)                    Narrative:    EXAMINATION: CT ABDOMEN PELVIS WITH IV CONTRAST    COMPARISON: None available.    INDICATION: Abdominal abscess/infection suspected; sacral decub    FINDING(S): During the uneventful dynamic intravenous administration of 100 mL Omnipaque 350 water-soluble contrast, routine spiral 2.0 mm axial tomograms are obtained from the lung bases through the femoral trochanters. Supplemental coronal and sagittal reconstruction imaging is also created. All CT scans at this facility use dose modulation, iterative reconstruction and/or weight based dosing when appropriate to reduce radiation dose to as low as reasonably achievable.    FINDINGS:  Liver: No acute findings.  Gallbladder and biliary tract: Cholelithiasis in the gallbladder is mildly dilated measuring approximately 4.4 cm transverse. No associated gallbladder wall thickening or pericholecystic fluid is seen. No bile duct dilation is evident.  Pancreas: No acute findings.  Spleen: No splenomegaly.  Adrenal glands: No acute findings.  Kidneys, ureters and urinary bladder: Multiple bilateral variable sized renal cortical cystic foci are noted. Most appear simple/benign and requires no imaging follow-up. Some are too small to characterize. There is a well-defined 4.1 cm exophytic right lower pole cystic appearing focus measuring approximately 20 Hounsfield units with thin peripheral mural calcification present in keeping with a Bosniak 2 type cyst. An oval fairly well-defined 6.5 cm exophytic mass arising from the left lower pole measures near 90 Hounsfield units. This is likely a benign hyperdense cyst but follow-up is recommended to confirm. The urinary bladder is poorly distended with a Ruelas catheter in place and mild circumferential wall thickening which is likely artifact. Gas in the dome of the bladder is likely iatrogenic.  Reproductive organs: No acute findings.  Stomach and duodenum: Post bariatric surgery changes are noted. No acute gastric  or duodenal pathology is evident.  Small bowel: No acute findings.  Appendix: No acute findings.  Large bowel: The anterior left pelvic level cholostomy is benign appearing. No large bowel obstruction is evident.  Lymph nodes: No pathologic lymph node enlargement is identified.  Vasculature: Moderate atherosclerotic change. No aneurysm formation. An IVC filter is in satisfactory position.  Peritoneal space: A solitary thin ovoid pocket of free intraperitoneal gas is noted anterior to anterior upper abdominal small bowel, axial image 87. No other pneumoperitoneum is evident. No ascites is evident.  Bones/joints: A sacral decubitus ulcer is present extending down to the inferior coccyx with associated bony erosive changes in keeping with acute osteomyelitis. Bilateral hip prosthesis artifact is noted.  Body wall: A multiloculated lower anterior right pelvic region subcutaneous collection is near fluid density. This overlying soft tissue stranding and skin thickening. This could represent an abscess collection or infected hematoma or seroma    IMPRESSION:  1. A solitary very small pocket of intraperitoneal gas is noted anterior to upper abdominal small bowel loops. This is of uncertain etiology or significance. No other pneumoperitoneum is identified.  2. Cholelithiasis is noted with mild distention of the gallbladder. The distention may be physiologic from an n.p.o. status but clinical correlation and follow-up is recommended to rule out acute cholecystitis.  3. Multiple bilateral renal cortical cystic foci are simple or benign-appearing. An exophytic left lower pole well-defined mass measures near 90 Hounsfield units, likely representing a hyperdense cyst. Correlation with renal ultrasound is respectfully recommended to rule out a solid mass.  4. An IVC filter is present on the study. It is recommended that all patients with IVC filters in place have an active management care plan to monitor the IVC filter. If the  care plan is not in place, it is recommended this patient be referred to an interventional provider for evaluation and establishment of an IVC filter management care plan.  5. A sacral decubitus ulcer extends down to the coccyx with associated bony erosions in keeping with acute osteomyelitis.  6. A lobulated possibly cystic appearing subcutaneous fat collection in the anterior right pelvis is associated with overlying cellulitis type changes. Clinical correlation and follow-up is recommended to rule out an abscess or infected hematoma or seroma. Ultrasound guidance could be used for tissue sampling if clinically indicated.      Electronically signed by:  Maximiliano Martin MD  6/5/2023 9:52 PM CDT Workstation: [id number]                                     X-Ray Ankle Complete Right (Final result)  Result time 06/05/23 18:49:06      Final result by Lisha Lopez DO (06/05/23 18:49:06)                   Narrative:    Right ankle radiographs: 6/5/2023 6:48 PM CDT    HISTORY:  77 years  old Male with .    COMPARISON: None available    TECHNIQUE: AP, lateral, mortise views of the right ankle were obtained.    FINDINGS: There are no findings to suggest an acute fracture or subluxation within the right tibia. There is likely a remote fracture deformity through the distal right fibula. There is also abnormal lucency extending across the distal right fibula concerning for an acute fracture There is degenerative change seen at the mortise joint. There is an incidental calcaneal heel spur. There is a compression plate seen at the first metatarsal partially visualized.    No gross radiopaque foreign body is seen. There is atherosclerotic calcification seen at the arteries. There is mild Achilles enthesopathy.    IMPRESSION: There is irregularity through the distal fibula extending to the mortise joint concerning for an acute, nondisplaced fracture. There may also be a remote fracture through the distal  fibula.    Electronically signed by:  Lisha Lopez DO  6/5/2023 6:49 PM CDT Workstation: [id number]                                     X-Ray Chest 1 View (Final result)  Result time 06/05/23 17:23:55      Final result by Lisha Lopez DO (06/05/23 17:23:55)                   Narrative:    AP chest radiograph: 6/5/2023 5:23 PM CDT    History: 77 years  old Male with Sepsis.    Comparison: 4/26/2023    Findings: The cardiomediastinal silhouette is enlarged.    A left upper extremity PICC line catheter tip projects at the brachiocephalic/SVC region.    No pneumothorax is seen.    There is multilevel posterior fusion hardware at the cervicothoracic spine.    No acute airspace opacities are seen.    No discrete pleural effusion is apparent.    Impression: No acute airspace opacities are seen.    Electronically signed by:  Lisha Lopez DO  6/5/2023 5:23 PM CDT Workstation: [id number]                                     Medications   vancomycin - pharmacy to dose (has no administration in time range)   0.9%  NaCl infusion (has no administration in time range)   meropenem 1 g in sodium chloride 0.9 % 100 mL IVPB (ready to mix system) (has no administration in time range)   allopurinoL tablet 100 mg (has no administration in time range)   amiodarone tablet 200 mg (has no administration in time range)   atorvastatin tablet 80 mg (has no administration in time range)   guaiFENesin 12 hr tablet 600 mg (has no administration in time range)   traZODone tablet 50 mg (has no administration in time range)   pantoprazole EC tablet 40 mg (has no administration in time range)   sodium chloride 0.9% flush 10 mL (has no administration in time range)   albuterol-ipratropium 2.5 mg-0.5 mg/3 mL nebulizer solution 3 mL (has no administration in time range)   melatonin tablet 6 mg (has no administration in time range)   ondansetron injection 4 mg (has no administration in time range)   polyethylene glycol packet 17 g (has no  administration in time range)   senna-docusate 8.6-50 mg per tablet 1 tablet (has no administration in time range)   acetaminophen tablet 650 mg (has no administration in time range)   simethicone chewable tablet 80 mg (has no administration in time range)   HYDROcodone-acetaminophen 5-325 mg per tablet 1 tablet (has no administration in time range)   morphine injection 4 mg (has no administration in time range)   naloxone 0.4 mg/mL injection 0.02 mg (has no administration in time range)   glucose chewable tablet 16 g (has no administration in time range)   glucose chewable tablet 24 g (has no administration in time range)   dextrose 50% injection 12.5 g (has no administration in time range)   dextrose 50% injection 25 g (has no administration in time range)   glucagon (human recombinant) injection 1 mg (has no administration in time range)   insulin aspart U-100 pen 1-10 Units (has no administration in time range)   cefepime 2 g in dextrose 5% 100 mL IVPB (ready to mix) (0 g Intravenous Stopped 6/5/23 1850)   vancomycin in dextrose 5 % 1 gram/250 mL IVPB 1,000 mg (0 mg Intravenous Stopped 6/5/23 2348)     And   vancomycin in dextrose 5 % 1 gram/250 mL IVPB 1,000 mg (0 mg Intravenous Stopped 6/5/23 2205)   iohexoL (OMNIPAQUE 350) injection 100 mL (100 mLs Intravenous Given 6/5/23 2036)   lactated ringers bolus 2,052 mL (2,052 mLs Intravenous New Bag 6/5/23 2225)     Medical Decision Making:   Initial Assessment:   Seen and evaluated presented with a chief complaint of wound check.  He has extensive in severe decubitus ulceration throughout his sacrum and heels.  The right lower extremity wound was draining a purulent discharge.  He was covered broadly with antibiotics.  There was also urinalysis concerning for possible UTI.  A CT of the chest abdomen and pelvis was done.  We were looking for possible pulmonary embolism as he had a brief but noted oxygen requirement which was different than his baseline.  This  happened before fluid resuscitation.  The CT PE was performed.  It did not demonstrate any acute pulmonary embolism.  The CT of the abdomen and pelvis did demonstrate multiple severe wounds as well as a possible developing cellulitis and abscess in addition osteomyelitis.  Lastly, there was a single foci of possible intraperitoneal air above the segment of small bowel.  This was discussed real-time with General surgery.  The patient has a soft benign abdomen that does not appear peritoneal.  He has a known ostomy.  It is unclear where the single focus of gas came from, but I think that clinically it is not related to his current process.  Surgery will follow him closely, should he decompensate, medicine can get in touch with them.  He has been admitted in guarded condition.                        Clinical Impression:   Final diagnoses:  [Z51.89] Wound check, abscess  [M86.9] Osteomyelitis        ED Disposition Condition    Admit Stable                Jay Jay Chávez Jr., MD  06/06/23 0059

## 2023-06-06 PROBLEM — K66.8 PNEUMOPERITONEUM OF UNKNOWN ETIOLOGY: Status: ACTIVE | Noted: 2023-06-06

## 2023-06-06 PROBLEM — S82.66XA: Status: ACTIVE | Noted: 2023-06-06

## 2023-06-06 PROBLEM — Z79.01 ANTICOAGULATED ON COUMADIN: Status: ACTIVE | Noted: 2023-06-06

## 2023-06-06 PROBLEM — L89.90 DECUBITUS ULCER: Chronic | Status: ACTIVE | Noted: 2023-06-06

## 2023-06-06 PROBLEM — D64.9 ANEMIA: Status: ACTIVE | Noted: 2023-06-06

## 2023-06-06 PROBLEM — Z74.01 BEDBOUND: Chronic | Status: ACTIVE | Noted: 2023-06-06

## 2023-06-06 LAB
ALBUMIN SERPL BCP-MCNC: 1.9 G/DL (ref 3.5–5.2)
ALP SERPL-CCNC: 218 U/L (ref 55–135)
ALT SERPL W/O P-5'-P-CCNC: 36 U/L (ref 10–44)
ANION GAP SERPL CALC-SCNC: 4 MMOL/L (ref 8–16)
AST SERPL-CCNC: 34 U/L (ref 10–40)
BASOPHILS # BLD AUTO: 0.03 K/UL (ref 0–0.2)
BASOPHILS NFR BLD: 0.3 % (ref 0–1.9)
BILIRUB SERPL-MCNC: 0.5 MG/DL (ref 0.1–1)
BUN SERPL-MCNC: 37 MG/DL (ref 8–23)
CALCIUM SERPL-MCNC: 8.6 MG/DL (ref 8.7–10.5)
CHLORIDE SERPL-SCNC: 103 MMOL/L (ref 95–110)
CO2 SERPL-SCNC: 28 MMOL/L (ref 23–29)
CREAT SERPL-MCNC: 1.4 MG/DL (ref 0.5–1.4)
DIFFERENTIAL METHOD: ABNORMAL
EOSINOPHIL # BLD AUTO: 0.1 K/UL (ref 0–0.5)
EOSINOPHIL NFR BLD: 1.4 % (ref 0–8)
ERYTHROCYTE [DISTWIDTH] IN BLOOD BY AUTOMATED COUNT: 16.8 % (ref 11.5–14.5)
EST. GFR  (NO RACE VARIABLE): 52.4 ML/MIN/1.73 M^2
FERRITIN SERPL-MCNC: 723 NG/ML (ref 20–300)
FOLATE SERPL-MCNC: 18.1 NG/ML (ref 4–24)
GLUCOSE SERPL-MCNC: 100 MG/DL (ref 70–110)
GLUCOSE SERPL-MCNC: 109 MG/DL (ref 70–110)
GLUCOSE SERPL-MCNC: 147 MG/DL (ref 70–110)
GLUCOSE SERPL-MCNC: 81 MG/DL (ref 70–110)
GLUCOSE SERPL-MCNC: 91 MG/DL (ref 70–110)
GLUCOSE SERPL-MCNC: 92 MG/DL (ref 70–110)
HCT VFR BLD AUTO: 21 % (ref 40–54)
HGB BLD-MCNC: 7.1 G/DL (ref 14–18)
IMM GRANULOCYTES # BLD AUTO: 0.07 K/UL (ref 0–0.04)
IMM GRANULOCYTES NFR BLD AUTO: 0.8 % (ref 0–0.5)
INR PPP: 2.8 (ref 0.8–1.2)
IRON SERPL-MCNC: 10 UG/DL (ref 45–160)
LYMPHOCYTES # BLD AUTO: 1.1 K/UL (ref 1–4.8)
LYMPHOCYTES NFR BLD: 12.1 % (ref 18–48)
MAGNESIUM SERPL-MCNC: 1.7 MG/DL (ref 1.6–2.6)
MCH RBC QN AUTO: 32.7 PG (ref 27–31)
MCHC RBC AUTO-ENTMCNC: 33.8 G/DL (ref 32–36)
MCV RBC AUTO: 97 FL (ref 82–98)
MONOCYTES # BLD AUTO: 0.8 K/UL (ref 0.3–1)
MONOCYTES NFR BLD: 8.8 % (ref 4–15)
NEUTROPHILS # BLD AUTO: 7 K/UL (ref 1.8–7.7)
NEUTROPHILS NFR BLD: 76.6 % (ref 38–73)
NRBC BLD-RTO: 0 /100 WBC
PLATELET # BLD AUTO: 334 K/UL (ref 150–450)
PMV BLD AUTO: 9.7 FL (ref 9.2–12.9)
POTASSIUM SERPL-SCNC: 3.7 MMOL/L (ref 3.5–5.1)
PROT SERPL-MCNC: 5 G/DL (ref 6–8.4)
PROTHROMBIN TIME: 29.5 SEC (ref 9–12.5)
RBC # BLD AUTO: 2.17 M/UL (ref 4.6–6.2)
SATURATED IRON: 9 % (ref 20–50)
SODIUM SERPL-SCNC: 135 MMOL/L (ref 136–145)
TOTAL IRON BINDING CAPACITY: 115 UG/DL (ref 250–450)
TRANSFERRIN SERPL-MCNC: 82 MG/DL (ref 200–375)
VIT B12 SERPL-MCNC: 486 PG/ML (ref 210–950)
WBC # BLD AUTO: 9.08 K/UL (ref 3.9–12.7)

## 2023-06-06 PROCEDURE — 36415 COLL VENOUS BLD VENIPUNCTURE: CPT | Performed by: FAMILY MEDICINE

## 2023-06-06 PROCEDURE — 99900031 HC PATIENT EDUCATION (STAT)

## 2023-06-06 PROCEDURE — 87186 SC STD MICRODIL/AGAR DIL: CPT | Performed by: FAMILY MEDICINE

## 2023-06-06 PROCEDURE — 63600175 PHARM REV CODE 636 W HCPCS: Performed by: INTERNAL MEDICINE

## 2023-06-06 PROCEDURE — 99223 PR INITIAL HOSPITAL CARE,LEVL III: ICD-10-PCS | Mod: ,,, | Performed by: INTERNAL MEDICINE

## 2023-06-06 PROCEDURE — 63600175 PHARM REV CODE 636 W HCPCS: Performed by: FAMILY MEDICINE

## 2023-06-06 PROCEDURE — 83735 ASSAY OF MAGNESIUM: CPT | Performed by: FAMILY MEDICINE

## 2023-06-06 PROCEDURE — 83036 HEMOGLOBIN GLYCOSYLATED A1C: CPT | Mod: 91 | Performed by: FAMILY MEDICINE

## 2023-06-06 PROCEDURE — 99900035 HC TECH TIME PER 15 MIN (STAT)

## 2023-06-06 PROCEDURE — 85025 COMPLETE CBC W/AUTO DIFF WBC: CPT | Performed by: FAMILY MEDICINE

## 2023-06-06 PROCEDURE — 82728 ASSAY OF FERRITIN: CPT | Performed by: FAMILY MEDICINE

## 2023-06-06 PROCEDURE — 87070 CULTURE OTHR SPECIMN AEROBIC: CPT | Mod: 59 | Performed by: FAMILY MEDICINE

## 2023-06-06 PROCEDURE — 99223 1ST HOSP IP/OBS HIGH 75: CPT | Mod: 24,,, | Performed by: STUDENT IN AN ORGANIZED HEALTH CARE EDUCATION/TRAINING PROGRAM

## 2023-06-06 PROCEDURE — 82746 ASSAY OF FOLIC ACID SERUM: CPT | Performed by: FAMILY MEDICINE

## 2023-06-06 PROCEDURE — 87075 CULTR BACTERIA EXCEPT BLOOD: CPT | Mod: 59 | Performed by: FAMILY MEDICINE

## 2023-06-06 PROCEDURE — 80053 COMPREHEN METABOLIC PANEL: CPT | Performed by: FAMILY MEDICINE

## 2023-06-06 PROCEDURE — 99223 PR INITIAL HOSPITAL CARE,LEVL III: ICD-10-PCS | Mod: 24,,, | Performed by: STUDENT IN AN ORGANIZED HEALTH CARE EDUCATION/TRAINING PROGRAM

## 2023-06-06 PROCEDURE — 83036 HEMOGLOBIN GLYCOSYLATED A1C: CPT | Performed by: FAMILY MEDICINE

## 2023-06-06 PROCEDURE — 12000002 HC ACUTE/MED SURGE SEMI-PRIVATE ROOM

## 2023-06-06 PROCEDURE — 99223 1ST HOSP IP/OBS HIGH 75: CPT | Mod: ,,, | Performed by: INTERNAL MEDICINE

## 2023-06-06 PROCEDURE — 25000003 PHARM REV CODE 250: Performed by: INTERNAL MEDICINE

## 2023-06-06 PROCEDURE — 99223 PR INITIAL HOSPITAL CARE,LEVL III: ICD-10-PCS | Mod: ,,, | Performed by: FAMILY MEDICINE

## 2023-06-06 PROCEDURE — 94760 N-INVAS EAR/PLS OXIMETRY 1: CPT

## 2023-06-06 PROCEDURE — 99223 1ST HOSP IP/OBS HIGH 75: CPT | Mod: ,,, | Performed by: FAMILY MEDICINE

## 2023-06-06 PROCEDURE — 25000003 PHARM REV CODE 250: Performed by: FAMILY MEDICINE

## 2023-06-06 PROCEDURE — 87077 CULTURE AEROBIC IDENTIFY: CPT | Mod: 59 | Performed by: FAMILY MEDICINE

## 2023-06-06 PROCEDURE — 94799 UNLISTED PULMONARY SVC/PX: CPT

## 2023-06-06 PROCEDURE — 84466 ASSAY OF TRANSFERRIN: CPT | Performed by: FAMILY MEDICINE

## 2023-06-06 PROCEDURE — 82607 VITAMIN B-12: CPT | Performed by: FAMILY MEDICINE

## 2023-06-06 PROCEDURE — 94660 CPAP INITIATION&MGMT: CPT

## 2023-06-06 PROCEDURE — 94761 N-INVAS EAR/PLS OXIMETRY MLT: CPT

## 2023-06-06 PROCEDURE — 85610 PROTHROMBIN TIME: CPT | Performed by: FAMILY MEDICINE

## 2023-06-06 PROCEDURE — 27000221 HC OXYGEN, UP TO 24 HOURS

## 2023-06-06 RX ORDER — FUROSEMIDE 10 MG/ML
20 INJECTION INTRAMUSCULAR; INTRAVENOUS
Status: DISCONTINUED | OUTPATIENT
Start: 2023-06-06 | End: 2023-06-12 | Stop reason: HOSPADM

## 2023-06-06 RX ORDER — ATORVASTATIN CALCIUM 40 MG/1
40 TABLET, FILM COATED ORAL NIGHTLY
Status: DISCONTINUED | OUTPATIENT
Start: 2023-06-06 | End: 2023-06-06

## 2023-06-06 RX ORDER — MORPHINE SULFATE 2 MG/ML
2 INJECTION, SOLUTION INTRAMUSCULAR; INTRAVENOUS EVERY 4 HOURS PRN
Status: DISCONTINUED | OUTPATIENT
Start: 2023-06-06 | End: 2023-06-12 | Stop reason: HOSPADM

## 2023-06-06 RX ORDER — ATORVASTATIN CALCIUM 20 MG/1
20 TABLET, FILM COATED ORAL NIGHTLY
Status: DISCONTINUED | OUTPATIENT
Start: 2023-06-06 | End: 2023-06-12 | Stop reason: HOSPADM

## 2023-06-06 RX ADMIN — ALLOPURINOL 100 MG: 100 TABLET ORAL at 09:06

## 2023-06-06 RX ADMIN — WHITE PETROLATUM 41 % TOPICAL OINTMENT: at 04:06

## 2023-06-06 RX ADMIN — GUAIFENESIN 600 MG: 600 TABLET, EXTENDED RELEASE ORAL at 01:06

## 2023-06-06 RX ADMIN — SODIUM CHLORIDE 125 MG: 9 INJECTION, SOLUTION INTRAVENOUS at 09:06

## 2023-06-06 RX ADMIN — AMIODARONE HYDROCHLORIDE 200 MG: 200 TABLET ORAL at 09:06

## 2023-06-06 RX ADMIN — MEROPENEM 1 G: 1 INJECTION, POWDER, FOR SOLUTION INTRAVENOUS at 09:06

## 2023-06-06 RX ADMIN — TRAZODONE HYDROCHLORIDE 50 MG: 50 TABLET ORAL at 01:06

## 2023-06-06 RX ADMIN — ATORVASTATIN CALCIUM 80 MG: 40 TABLET, FILM COATED ORAL at 01:06

## 2023-06-06 RX ADMIN — TRAZODONE HYDROCHLORIDE 50 MG: 50 TABLET ORAL at 09:06

## 2023-06-06 RX ADMIN — HYDROCODONE BITARTRATE AND ACETAMINOPHEN 1 TABLET: 5; 325 TABLET ORAL at 11:06

## 2023-06-06 RX ADMIN — MEROPENEM 1 G: 1 INJECTION, POWDER, FOR SOLUTION INTRAVENOUS at 04:06

## 2023-06-06 RX ADMIN — ATORVASTATIN CALCIUM 20 MG: 20 TABLET, FILM COATED ORAL at 09:06

## 2023-06-06 RX ADMIN — VANCOMYCIN HYDROCHLORIDE 1750 MG: 500 INJECTION, POWDER, LYOPHILIZED, FOR SOLUTION INTRAVENOUS at 09:06

## 2023-06-06 RX ADMIN — GUAIFENESIN 600 MG: 600 TABLET, EXTENDED RELEASE ORAL at 09:06

## 2023-06-06 RX ADMIN — AMIODARONE HYDROCHLORIDE 200 MG: 200 TABLET ORAL at 01:06

## 2023-06-06 RX ADMIN — MEROPENEM 1 G: 1 INJECTION, POWDER, FOR SOLUTION INTRAVENOUS at 01:06

## 2023-06-06 RX ADMIN — FUROSEMIDE 20 MG: 20 INJECTION, SOLUTION INTRAMUSCULAR; INTRAVENOUS at 04:06

## 2023-06-06 RX ADMIN — PANTOPRAZOLE SODIUM 40 MG: 40 TABLET, DELAYED RELEASE ORAL at 05:06

## 2023-06-06 RX ADMIN — SODIUM CHLORIDE: 0.9 INJECTION, SOLUTION INTRAVENOUS at 01:06

## 2023-06-06 RX ADMIN — ALLOPURINOL 100 MG: 100 TABLET ORAL at 01:06

## 2023-06-06 NOTE — PLAN OF CARE
06/06/23 0108   Patient Assessment/Suction   Level of Consciousness (AVPU) alert   Respiratory Effort Unlabored   All Lung Fields Breath Sounds clear;diminished   Skin Integrity   $ Wound Care Tech Time 15 min   Area Observed Bridge of nose;Cheek;Left;Right   Skin Appearance without discoloration   Barrier used? Gel Cushion   PRE-TX-O2   Device (Oxygen Therapy) BIPAP   $ Is the patient on Low Flow Oxygen? Yes   Oxygen Concentration (%) 30   SpO2 100 %   Pulse Oximetry Type Intermittent   $ Pulse Oximetry - Multiple Charge Pulse Oximetry - Multiple   Pulse 74   Resp (!) 24   Ready to Wean/Extubation Screen   FIO2<=50 (chart decimal) 0.3   Preset CPAP/BiPAP Settings   Mode Of Delivery BiPAP   Size of Mask Medium/Large   Equipment Type V60   Ipap 10   EPAP (cm H2O) 5   Pressure Support (cm H2O) 5   ITime (sec) 1   Rise Time (sec) 2   Patient CPAP/BiPAP Settings   RR Total (Breaths/Min) 24   Tidal Volume (mL) 441   VE Minute Ventilation (L/min) 10.7 L/min   Peak Inspiratory Pressure (cm H2O) 11   TiTOT (%) 30   Total Leak (L/Min) 23   Patient Trigger - ST Mode Only (%) 59   CPAP/BiPAP Backup Settings   Backup Rate 10 breaths per minute (bpm)   CPAP/BiPAP Alarms   High Pressure (cm H2O) 40   Low Pressure (cm H2O) 10   Minute Ventilation (L/Min) 3   High RR (breaths/min) 40   Low RR (breaths/min) 10   Apnea (Sec) 20

## 2023-06-06 NOTE — CONSULTS
[facility]  General Surgery  Consult Note    Consults  Subjective:     Chief Complaint/Reason for Admission:  Concern for worsening sacral wound    History of Present Illness: This is a 77-year-old male who lives at a nursing facility.  He is a history of dementia.  He apparently fell walking out of his house a few months ago.  Patient has severe weakness in his legs related to cord compression without complete paralysis.  He is bed-bound.  He has longstanding wounds and is being followed by wound care.  He was brought by EMS to the ER for concerns about worsening sacral wounds.  Patient denied any abdominal complaints.  A CT scan in the ER was concerning for a very small focus of free air without significant inflammatory changes anywhere else in the abdominal cavity.  Today, patient still denies abdominal pain.  He is hungry.  He is having ostomy output.    Current Facility-Administered Medications on File Prior to Encounter   Medication    [MAR Hold - Suspended Admission] allopurinoL tablet 100 mg    [MAR Hold - Suspended Admission] aluminum-magnesium hydroxide-simethicone 200-200-20 mg/5 mL suspension 30 mL    [MAR Hold - Suspended Admission] amiodarone tablet 200 mg    [MAR Hold - Suspended Admission] ascorbic acid (vitamin C) tablet 500 mg    [MAR Hold - Suspended Admission] aspirin EC tablet 81 mg    [MAR Hold - Suspended Admission] atorvastatin tablet 80 mg    [MAR Hold - Suspended Admission] bisacodyL suppository 10 mg    [MAR Hold - Suspended Admission] collagenase ointment    [MAR Hold - Suspended Admission] collagenase ointment    [MAR Hold - Suspended Admission] famotidine tablet 40 mg    [MAR Hold - Suspended Admission] furosemide tablet 20 mg    [MAR Hold - Suspended Admission] glucagon (human recombinant) injection 1 mg    [MAR Hold - Suspended Admission] glucose chewable tablet 16 g    [MAR Hold - Suspended Admission] glucose chewable tablet 24 g    [MAR Hold - Suspended Admission]  guaiFENesin 12 hr tablet 600 mg    [MAR Hold - Suspended Admission] HYDROcodone-acetaminophen 5-325 mg per tablet 1 tablet    [MAR Hold - Suspended Admission] insulin aspart U-100 pen 1-10 Units    [MAR Hold - Suspended Admission] LIDOcaine HCL 4% external solution 4 mL    [MAR Hold - Suspended Admission] melatonin tablet 6 mg    [MAR Hold - Suspended Admission] multivitamin tablet    [MAR Hold - Suspended Admission] naloxone 0.4 mg/mL injection 0.02 mg    [MAR Hold - Suspended Admission] ondansetron disintegrating tablet 8 mg    [MAR Hold - Suspended Admission] potassium chloride CR tablet 10 mEq    [MAR Hold - Suspended Admission] simethicone chewable tablet 80 mg    [MAR Hold - Suspended Admission] sodium chloride 0.9% flush 10 mL    [MAR Hold - Suspended Admission] sodium chloride 0.9% flush 10 mL    And    [MAR Hold - Suspended Admission] sodium chloride 0.9% flush 10 mL    [MAR Hold - Suspended Admission] traZODone tablet 50 mg    [MAR Hold - Suspended Admission] vitamin D 1000 units tablet 1,000 Units    [MAR Hold - Suspended Admission] zinc sulfate capsule 220 mg     Current Outpatient Medications on File Prior to Encounter   Medication Sig    aspirin (ECOTRIN LOW STRENGTH) 81 MG EC tablet Take 1 tablet by mouth.    allopurinoL (ZYLOPRIM) 100 MG tablet Take 1 tablet (100 mg total) by mouth every evening.    amiodarone (PACERONE) 200 MG Tab Take 1 tablet (200 mg total) by mouth 2 (two) times daily.    atorvastatin (LIPITOR) 80 MG tablet Take 1 tablet (80 mg total) by mouth once daily.    calcitRIOL (ROCALTROL) 0.5 MCG Cap Take 1 capsule (0.5 mcg total) by mouth once daily.    ceftriaxone sodium (CEFTRIAXONE 2 G  ML D5W) 2 g/100 mL IVPB Inject 100 mLs (2 g total) into the vein once daily.    ciclopirox (PENLAC) 8 % Soln Apply 1 application topically nightly.    clopidogreL (PLAVIX) 75 mg tablet Take 1 tablet by mouth once daily.    famotidine (PEPCID) 40 MG tablet Take 1 tablet (40 mg total) by mouth  once daily.    ferrous sulfate (FEROSUL) 325 mg (65 mg iron) Tab tablet TAKE 1 TABLET BY MOUTH TWICE DAILY    fluticasone propionate (FLONASE) 50 mcg/actuation nasal spray 1 spray by Each Nostril route once daily.    insulin aspart U-100 (NOVOLOG) 100 unit/mL (3 mL) InPn pen Inject into the skin 3 (three) times daily with meals.    lisinopriL (PRINIVIL,ZESTRIL) 40 MG tablet Take 40 mg by mouth once daily.    mecobal-levomefolat Ca-B6 phos (FOLTANX) 3-35-2 mg Tab Take 1 tablet by mouth once daily.    metronidazole (FLAGYL) 500 mg/100 mL IVPB Inject 100 mLs (500 mg total) into the vein every 8 (eight) hours.    mirabegron (MYRBETRIQ) 50 mg Tb24 Take 1 tablet by mouth once daily.    nitroGLYCERIN 0.4 MG/DOSE TL SPRY (NITROLINGUAL) 400 mcg/spray spray Place 1 spray under the tongue every 5 (five) minutes as needed.    omeprazole (PRILOSEC) 20 MG capsule Take 20 mg by mouth once daily.    traZODone (DESYREL) 50 MG tablet Take 1 tablet by mouth every evening.    triamcinolone acetonide 0.1% (KENALOG) 0.1 % cream APPLY TO THE AFFECTED AREA(S) TWICE DAILY (Patient taking differently: Apply 1 g topically 2 (two) times daily. APPLY TO THE AFFECTED AREA(S) TWICE DAILY)    warfarin (COUMADIN) 5 MG tablet Take 1 tablet (5 mg total) by mouth Daily.       Review of patient's allergies indicates:   Allergen Reactions    Donepezil Other (See Comments)     AMS    Bactroban [mupirocin calcium] Blisters     Causes Blisters    Shellfish containing products Other (See Comments)     Other reaction(s): Gout  OYSTERS       Past Medical History:   Diagnosis Date    Anemia     Anemia of other chronic disease 09/13/2017    Anemia, chronic renal failure 09/13/2017    Anemia, unspecified 09/13/2017    Anticoagulant long-term use     Aorta aneurysm     Arthritis     Atrial fibrillation     Bacteremia due to Streptococcus 01/08/2022    CAD (coronary artery disease)     CHF (congestive heart failure)     Chronic kidney disease     Clotting disorder  09/13/2017    Colon polyp     Dementia     Diabetes mellitus     not on meds    Diabetes mellitus type II     Diverticulosis     Elevated PSA     Encounter for blood transfusion     Former smoker     General anesthetics causing adverse effect in therapeutic use     TROUBLE COMING OUT OF ANESTHESIA WITH GASTRIC BYPASS    GERD (gastroesophageal reflux disease)     Gout     Hx of colonic polyps     Hypercoagulable state     Hyperlipidemia     Hypertension     MI, old 2010    MTHFR mutation     Myocardial infarction     9/2010    Osteomyelitis of ankle or foot 03/09/2017    Prostate cancer 06/2016    Sleep apnea     Squamous cell carcinoma 01/23/2018    Left helix, imiquimod    Supratherapeutic INR 4/19/2023    Venous stasis     Chronic     Past Surgical History:   Procedure Laterality Date    ABDOMINAL SURGERY      CARDIAC SURGERY      3 STENTS     CAUDAL EPIDURAL STEROID INJECTION N/A 6/22/2020    Procedure: INJECTION, STEROID, SPINE, EPIDURAL, CAUDAL;  Surgeon: Evangelist Bose MD;  Location: [facility];  Service: Pain Management;  Laterality: N/A;    COLONOSCOPY  02/2011    diverticulosis with diverticula with clot, no active bleeding    COLONOSCOPY N/A 8/24/2016    Procedure: COLONOSCOPY;  Surgeon: Saroj Borjas MD;  Location: [facility];  Service: Endoscopy;  Laterality: N/A;    COLONOSCOPY N/A 11/18/2020    Procedure: COLONOSCOPY;  Surgeon: Saroj Borjas MD;  Location: [facility];  Service: Endoscopy;  Laterality: N/A;    COLONOSCOPY N/A 10/27/2021    Procedure: COLONOSCOPY;  Surgeon: Saroj Borjas MD;  Location: [facility];  Service: Endoscopy;  Laterality: N/A;    CREATION, COLOSTOMY, LAPAROSCOPIC N/A 4/25/2023    Procedure: CREATION, COLOSTOMY, LAPAROSCOPIC;  Surgeon: Mark Martinez Jr., MD;  Location: [facility];  Service: General;  Laterality: N/A;    DEBRIDEMENT OF SACRAL WOUND N/A 4/20/2023    Procedure: DEBRIDEMENT, WOUND, SACRUM;  Surgeon: Mark Martinez Jr., MD;  Location: [facility];   Service: General;  Laterality: N/A;    EPIDURAL STEROID INJECTION INTO LUMBAR SPINE N/A 6/22/2020    Procedure: Injection-steroid-epidural-lumbar;  Surgeon: Evangelist Bose MD;  Location: [facility] OR;  Service: Pain Management;  Laterality: N/A;  L3 L4 L5 S1    EPIDURAL STEROID INJECTION INTO LUMBAR SPINE N/A 9/21/2020    Procedure: Injection-steroid-epidural-lumbar;  Surgeon: Evangelist Bose MD;  Location: [facility] OR;  Service: Pain Management;  Laterality: N/A;  L5-S1    FUSION, SPINE, CERVICAL N/A 3/24/2023    Procedure: FUSION, SPINE, CERVICAL;  Surgeon: Kike Kc DO;  Location: [facility] OR;  Service: Neurosurgery;  Laterality: N/A;  posterior cervical decompression/fusion C3-T1    GASTRIC BYPASS  2/5/2008    IVC FILTER RETRIEVAL      JOINT REPLACEMENT  1996 and 2001    bi-lat hip replacement/Rt Hip and Lt Hip    RADIOFREQUENCY ABLATION OF LUMBAR MEDIAL BRANCH NERVE AT SINGLE LEVEL Bilateral 1/10/2020    Procedure: Radiofrequency Ablation, Nerve, Spinal, Lumbar, Medial Branch, 1 Level;  Surgeon: Evangelist Bose MD;  Location: [facility] OR;  Service: Pain Management;  Laterality: Bilateral;  L3,L4,L5    RADIOFREQUENCY ABLATION OF LUMBAR MEDIAL BRANCH NERVE AT SINGLE LEVEL Bilateral 11/23/2021    Procedure: Radiofrequency Ablation, Nerve, Spinal, Lumbar, Medial Branch, Bilateral L 3,4,5;  Surgeon: Nile Causey MD;  Location: [facility] OR;  Service: Pain Management;  Laterality: Bilateral;    ROTATOR CUFF REPAIR      Rt shoulder    Stents  8/18/2010    x 3    UPPER GASTROINTESTINAL ENDOSCOPY  02/2011     Family History       Problem Relation (Age of Onset)    Heart attack Mother, Father, Brother    Lupus Daughter    Ulcerative colitis Daughter (35)          Tobacco Use    Smoking status: Former    Smokeless tobacco: Never    Tobacco comments:     45 yrs ago, only smoked 3-4 packs in his entire life as a teenager   Substance and Sexual Activity    Alcohol use: Not Currently     Comment: rare    Drug use: No    Sexual activity:  Not Currently     Review of Systems   Constitutional: Negative.  Negative for fatigue and fever.   HENT: Negative.     Eyes: Negative.    Respiratory: Negative.  Negative for shortness of breath.    Cardiovascular: Negative.  Negative for chest pain.   Gastrointestinal:  Negative for abdominal pain.   Endocrine: Negative.    Genitourinary: Negative.    Musculoskeletal: Negative.    Skin: Negative.    Allergic/Immunologic: Negative.    Neurological:  Positive for weakness.   Hematological: Negative.    Psychiatric/Behavioral: Negative.     Objective:     Vital Signs (Most Recent):  Temp: 97.3 °F (36.3 °C) (06/06/23 1146)  Pulse: 67 (06/06/23 1045)  Resp: 18 (06/06/23 1146)  BP: (!) 152/84 (06/06/23 1146)  SpO2: (!) 92 % (06/06/23 1146) Vital Signs (24h Range):  Temp:  [97.3 °F (36.3 °C)-98.8 °F (37.1 °C)] 97.3 °F (36.3 °C)  Pulse:  [64-83] 67  Resp:  [15-29] 18  SpO2:  [90 %-100 %] 92 %  BP: ()/(56-84) 152/84     Weight: 102.7 kg (226 lb 6.6 oz)  Body mass index is 34.43 kg/m².      Intake/Output Summary (Last 24 hours) at 6/6/2023 1427  Last data filed at 6/6/2023 1235  Gross per 24 hour   Intake 1028.65 ml   Output 1000 ml   Net 28.65 ml       Physical Exam  Constitutional:       General: He is not in acute distress.     Appearance: Normal appearance. He is not ill-appearing, toxic-appearing or diaphoretic.   HENT:      Head: Normocephalic.      Nose: Nose normal.   Eyes:      Conjunctiva/sclera: Conjunctivae normal.   Cardiovascular:      Rate and Rhythm: Normal rate and regular rhythm.   Pulmonary:      Effort: Pulmonary effort is normal.   Abdominal:      Comments: Patient has no abdominal tenderness.  His abdomen is soft.  There is a left lower quadrant ostomy that is viable with stool.  No distention   Musculoskeletal:         General: Normal range of motion.      Cervical back: Normal range of motion.   Skin:     General: Skin is warm.   Neurological:      General: No focal deficit present.      Mental  Status: He is alert.   Psychiatric:         Mood and Affect: Mood normal.       Significant Labs:  CBC:   Recent Labs   Lab 06/06/23  0449   WBC 9.08   RBC 2.17*   HGB 7.1*   HCT 21.0*      MCV 97   MCH 32.7*   MCHC 33.8     CMP:   Recent Labs   Lab 06/06/23  0449   GLU 81   CALCIUM 8.6*   ALBUMIN 1.9*   PROT 5.0*   *   K 3.7   CO2 28      BUN 37*   CREATININE 1.4   ALKPHOS 218*   ALT 36   AST 34   BILITOT 0.5     Coagulation:   Recent Labs   Lab 06/06/23  0449   INR 2.8*     Lactic Acid:   Recent Labs   Lab 06/05/23  1649   LACTATE 0.9       Significant Diagnostics:  CT scan completed last night as well as the CT scan I ordered this morning was reviewed.  There is a very small focus in the mid abdomen of what appears to be free air.  No other inflammatory changes or free fluid that would be concerning for perforation    Assessment/Plan:     Active Diagnoses:    Diagnosis Date Noted POA    PRINCIPAL PROBLEM:  Osteomyelitis [M86.9] 06/05/2023 Yes    HFrEF (heart failure with reduced ejection fraction) [I50.20] 06/05/2023 Yes    Scrotal injury [S39.94XA] 05/17/2023 Yes    Complex renal cyst [N28.1] 03/31/2020 Not Applicable    EMMY (acute kidney injury) [N17.9] 10/19/2019 Yes     Chronic    Paroxysmal atrial fibrillation [I48.0] 11/21/2014 Yes    LV dysfunction, EF 40% [I51.9] 02/08/2013 Yes    CKD (chronic kidney disease) stage 3, GFR 30-59 ml/min, 41 [N18.30] 01/23/2013 Yes    Aortic aneurysm, thoracic, 4.3 cm, 8/2010 [I71.20] 07/10/2012 Yes     Chronic    Diabetes mellitus with chronic kidney disease, stage III [E11.22] 12/16/2011 Yes     Chronic    MTHFR mutation (methylenetetrahydrofolate reductase) [Z15.89] 12/16/2011 Not Applicable    Sleep apnea, using BiPAP nightly, 1999, last sleep test in 2013 [G47.30] 12/16/2011 Yes     Chronic    Hypertension associated with diabetes [E11.59, I15.2] 12/16/2011 Yes    CAD (coronary artery disease) [I25.10] 12/16/2011 Yes    GERD (gastroesophageal reflux  disease) [K21.9] 12/16/2011 Yes      Problems Resolved During this Admission:     77-year-old male with multiple medical problems.  He has cord compression which causes weakness.  He is bed-bound.  He was transferred for evaluation of worsening sacral wounds reportedly.  Review of his wound showed that the wound beds are fairly healthy.  There was minimal fibrinous exudate which can be treated either topically were very limited debridement.  Wound Care follows with the patient in his planning possible bedside debridement tomorrow.  No plans for surgical intervention from my standpoint on wounds currently     With regards to the pneumoperitoneum found incidentally on CT scan to further evaluate his sacral wound, patient has no pain.  He does not have a lactic acidosis.  His white count has normalized and was only minimally elevated with other potential sources, namely sacral osteomyelitis.  After discussion with the family, they would like to observe for now which is likely the best course of action.  No identifiable source of the perforation and no other concerning findings on the CT scan.  Will follow up tomorrow.  Okay for diet      Thank you for your consult. I will follow-up with patient. Please contact us if you have any additional questions.    Montez Boykin MD  General Surgery  [facility]

## 2023-06-06 NOTE — ASSESSMENT & PLAN NOTE
1. Right distal fibula fracture, nondisplaced.    Patient is nonambulatory.  His fracture is nondisplaced.  Would normally treat this in a cam walker boot or some type of splint.  However, considering his nonambulatory status and his multiple wounds/decubiti to this lower extremity, we will leave open.  He is in a soft dressing.  This is more than sufficient to protect.  Should heal on its own without any further intervention over the next 6-8 weeks.  No surgery is planned for this current time.  He can follow up with us in the clinic as an outpatient for repeat radiographs to monitor healing.  Thank you for the consultation.

## 2023-06-06 NOTE — HPI
"Richard Bustos is a 77 y.o. White male   With PMH of as noted below,  Recently treated for sacral osteomyelitis,   S/p diverting colostomy,  who presents with worsening sacral wound.     Pt reports he feels "fine"  He denies pain at this time  Pt's wound recently grew morganella, proteus,  Then later pseudomonas (only intermediate sensitivity to cefepime), and enterobacter cloacae (intermediate sensitivity to zosyn)  "

## 2023-06-06 NOTE — PLAN OF CARE
AAOx4. VSS. Encouraged pt to have family bring personal BiPAP, pt does not like wearing hospitals. Wound care assessed pt today, placed orders for daily wound care. Speciality bed order placed. Turning Q2H at this time. PICC in place. Pt using call light. Contact precautions initiated. Pt requires 1:1 feeding due to weakness. Hematuria continues in mcdaniels catheter, urology consulted. Colostomy + output.       Problem: Infection  Goal: Absence of Infection Signs and Symptoms  Outcome: Ongoing, Progressing     Problem: Adult Inpatient Plan of Care  Goal: Plan of Care Review  Outcome: Ongoing, Progressing  Goal: Absence of Hospital-Acquired Illness or Injury  Outcome: Ongoing, Progressing  Goal: Optimal Comfort and Wellbeing  Outcome: Ongoing, Progressing

## 2023-06-06 NOTE — CONSULTS
[facility]  Adult Nutrition   Consult Note (Nutrition Support Management)    SUMMARY     Recommendations  Recommendation/Intervention:   1. Continue modified diet as tolerated.   2. May consider supplemental nutrition support should intake decline recommend Glucerna 1.5 @ 60 mL/hr + 1 pkt ProtGold and 50 mL free water flushes every 4 hours would meet pt's needs providing 2230 kcal, 136.5 gm protein; 1323 total free water. Pt would need an additional 661 mL free water to meet needs (suggest 165 mL additional FWF every 6 hours).  3. Recommend Glucerna and Landon BID to aid healing.   4. Consider a MVI for general health    Goals: 1. Intake to be >/= 75% EEN. 2. Labs trend to target range.  Nutrition Goal Status: new  Communication of RD Recs: reviewed with RN    Dietitian Rounds Brief  RD consult for nutrition support. Patient eating fair and drinks supplements per family members. May consider supplemental nutrition support should intake decline recommend Glucerna 1.5 @ 60 mL/hr + 1 pkt ProtGold and 50 mL free water flushes every 4 hours would meet pt's needs providing 2230 kcal, 136.5 gm protein; 1323 total free water. Pt would need an additional 661 mL free water to meet needs (suggest 165 mL additional FWF every 6 hours).    Currently patient must be feed all meals. Pt noted to also have Stage 4 sacral. wound; RD added Landon and Glucerna BID. Patient with diagnosis of severe malnutrition per prior tacitly RD as below:    Severe Chronic Malnutrition d/t measurable reduced  strength; 14% wt loss within 6 months and muscle mass loss visible in temple region. RD note 6/03/23    Diet order:   Current Diet Order: Dysphagia Mechanical Soft (IDDSI Level 5 thin liquids)     Evaluation of Received Nutrient/Fluid Intake  Energy Calories Required: meeting needs  Protein Required: not meeting needs, meeting needs  Fluid Required: not meeting needs  Tolerance: tolerating     % Intake of Estimated Energy Needs:  "25 - 50 %  % Meal Intake: 25 - 50 %      Intake/Output Summary (Last 24 hours) at 6/6/2023 1410  Last data filed at 6/6/2023 1235  Gross per 24 hour   Intake 1028.65 ml   Output 1000 ml   Net 28.65 ml        Anthropometrics  Temp: 97.3 °F (36.3 °C)  Height Method: Stated  Height: 5' 8" (172.7 cm)  Height (inches): 68 in  Weight Method: Bed Scale  Weight: 102.7 kg (226 lb 6.6 oz)  Weight (lb): 226.41 lb  Ideal Body Weight (IBW), Male: 154 lb  % Ideal Body Weight, Male (lb): 147.02 %  BMI (Calculated): 34.4       Estimated/Assessed Needs  Weight Used For Calorie Calculations: 102.7 kg (226 lb 6.6 oz)  Energy Calorie Requirements (kcal): 2054-2567kcal / day (20-25 kcal/kg)  Energy Need Method: Kcal/kg  Protein Requirements: 123-154 (1.5-2.0 gm/kg)  Weight Used For Protein Calculations: 102.7 kg (226 lb 6.6 oz)  Fluid Requirements (mL): 2567 mL / day (25mL / kg)     RDA Method (mL): 2054     Reason for Assessment  Reason For Assessment: consult  Diagnosis: cancer diagnosis/related complications, other (see comments) (osteomyelitis)  Relevant Medical History: Stage IV sacral pressure wound; DM2; HTN; CAD; A-fib; Dementia; CKD3; Metastatic Cancer; Chronic Anemia  Interdisciplinary Rounds: did not attend    Nutrition/Diet History  Food Allergies: shellfish  Factors Affecting Nutritional Intake: inability to feed self    Nutrition Risk Screen  Nutrition Risk Screen: large or nonhealing wound, burn or pressure injury       Altered Skin Integrity 05/02/23 1600 Penis-Wound Image: Images linked       Altered Skin Integrity 05/05/23 1415 posterior Scrotum Traumatic-Wound Image: Images linked       Altered Skin Integrity 04/28/23 1745 Sacral spine Ulceration Full thickness tissue loss. Base is covered by slough and/or eschar in the wound bed-Wound Image: Images linked       Altered Skin Integrity 04/28/23 1745 Left Arm-Wound Image: Images linked       Altered Skin Integrity 04/28/23 1745 Right distal Toe, first Ulceration Full " thickness tissue loss. Base is covered by slough and/or eschar in the wound bed-Wound Image: Images linked       Altered Skin Integrity 05/26/23 1715 Left posterior Heel Shearing Full thickness tissue loss. Base is covered by slough and/or eschar in the wound bed-Wound Image: Images linked       Altered Skin Integrity 05/26/23 1715 Right posterior Heel Shearing-Wound Image: Images linked  MST Score: 2  Have you recently lost weight without trying?: Unsure  Weight loss score: 2  Have you been eating poorly because of a decreased appetite?: No  Appetite score: 0     Weight History:  Wt Readings from Last 5 Encounters:   06/06/23 102.7 kg (226 lb 6.6 oz)   06/03/23 100.2 kg (220 lb 14.4 oz)   05/31/23 109.7 kg (241 lb 12.8 oz)   04/19/23 100.4 kg (221 lb 5.5 oz)   04/18/23 104.3 kg (230 lb)        Lab/Procedures/Meds: Pertinent Labs/Meds Reviewed    Medications:Pertinent Medications Reviewed  Scheduled Meds:   allopurinoL  100 mg Oral QHS    amiodarone  200 mg Oral BID    atorvastatin  80 mg Oral QHS    guaiFENesin  600 mg Oral BID    meropenem (MERREM) IVPB  1 g Intravenous Q8H    pantoprazole  40 mg Oral Daily    traZODone  50 mg Oral QHS    vancomycin (VANCOCIN) IVPB  1,750 mg Intravenous Q24H     Continuous Infusions:  PRN Meds:.acetaminophen, albuterol-ipratropium, dextrose 50%, dextrose 50%, glucagon (human recombinant), glucose, glucose, HYDROcodone-acetaminophen, insulin aspart U-100, melatonin, morphine, naloxone, ondansetron, polyethylene glycol, senna-docusate 8.6-50 mg, simethicone, sodium chloride 0.9%, Pharmacy to dose Vancomycin consult **AND** vancomycin - pharmacy to dose    Labs: Pertinent Labs Reviewed  Clinical Chemistry:  Recent Labs   Lab 06/05/23  1643 06/06/23  0449   * 135*   K 4.5 3.7    103   CO2 27 28   * 81   BUN 40* 37*   CREATININE 1.4 1.4   CALCIUM 9.2 8.6*   PROT 6.5 5.0*   ALBUMIN 2.4* 1.9*   BILITOT 0.4 0.5   ALKPHOS 292* 218*   AST 55* 34   ALT 51* 36   ANIONGAP 7*  4*   MG 1.8 1.7   PHOS 4.5  --      CBC:   Recent Labs   Lab 06/06/23  0449   WBC 9.08   RBC 2.17*   HGB 7.1*   HCT 21.0*      MCV 97   MCH 32.7*   MCHC 33.8     Lipid Panel:  No results for input(s): CHOL, HDL, LDLCALC, TRIG, CHOLHDL in the last 168 hours.  Cardiac Profile:  No results for input(s): BNP, CPK, CPKMB, TROPONINI, CKTOTAL in the last 168 hours.  Inflammatory Labs:  Recent Labs   Lab 06/05/23  1643   CRP 6.79*     Diabetes:  Recent Labs   Lab 06/02/23 2054 06/03/23  0540 06/06/23  0449   HGBA1C  --   --  Invalid*   POCTGLUCOSE 113* 93  --      Thyroid & Parathyroid:  No results for input(s): TSH, FREET4, E3AIYNU, P6KCGFK, THYROIDAB in the last 168 hours.    Monitor and Evaluation  Food and Nutrient Intake: energy intake, food and beverage intake  Food and Nutrient Adminstration: diet order  Knowledge/Beliefs/Attitudes: food and nutrition knowledge/skill  Physical Activity and Function: nutrition-related ADLs and IADLs  Anthropometric Measurements: weight  Biochemical Data, Medical Tests and Procedures: gastrointestinal profile, glucose/endocrine profile, inflammatory profile, lipid profile, electrolyte and renal panel  Nutrition-Focused Physical Findings: overall appearance     Nutrition Risk  Level of Risk/Frequency of Follow-up: moderate - high     Nutrition Follow-Up  RD Follow-up?: Yes      Carrie Butcher RD, DARIAN 06/06/2023 1:55 PM

## 2023-06-06 NOTE — PROGRESS NOTES
Discussed the case with Dr. Farooq this morning.  We will take care of the patient.  We will see the patient this afternoon after clinic.  Full consultation note to follow at that.

## 2023-06-06 NOTE — NURSING
Nurses Note -- 4 Eyes      6/6/2023   1:51 AM      Skin assessed during: Admit      [] No Altered Skin Integrity Present    []Prevention Measures Documented      [x] Yes- Altered Skin Integrity Present or Discovered   [x] LDA Added if Not in Epic (Describe Wound)   [x] New Altered Skin Integrity was Present on Admit and Documented in LDA   [x] Wound Image Taken    Wound Care Consulted? Yes    Attending Nurse:  TAYLOR CONN RN     Second RN/Staff Member:  [id number]

## 2023-06-06 NOTE — H&P
"[facility]   History & Physical     Patient Name: Richard Bustos  MRN: [id number]  Admission Date: 6/5/2023  4:17 PM  Attending Physician: Sailaja Grey MD  Face-to-Face encounter date: 06/05/2023 11:08 PM    Patient information was obtained from patient, past medical records, ER physician, and ER records.     HISTORY OF PRESENT ILLNESS:     Richard Bustos is a 77 y.o. White male   With PMH of as noted below,  Recently treated for sacral osteomyelitis,   S/p diverting colostomy,  who presents with worsening sacral wound.    Pt reports he feels "fine"  He denies pain at this time  Pt's wound recently grew morganella, proteus,  Then later pseudomonas (only intermediate sensitivity to cefepime), and enterobacter cloacae (intermediate sensitivity to zosyn)    REVIEW OF SYSTEMS:     Unable to obtain:  dementia at baseline    PAST MEDICAL HISTORY:     Past Medical History:   Diagnosis Date    Anemia     Anemia of other chronic disease 09/13/2017    Anemia, chronic renal failure 09/13/2017    Anemia, unspecified 09/13/2017    Anticoagulant long-term use     Aorta aneurysm     Arthritis     Atrial fibrillation     Bacteremia due to Streptococcus 01/08/2022    CAD (coronary artery disease)     CHF (congestive heart failure)     Chronic kidney disease     Clotting disorder 09/13/2017    Colon polyp     Dementia     Diabetes mellitus     not on meds    Diabetes mellitus type II     Diverticulosis     Elevated PSA     Encounter for blood transfusion     Former smoker     General anesthetics causing adverse effect in therapeutic use     TROUBLE COMING OUT OF ANESTHESIA WITH GASTRIC BYPASS    GERD (gastroesophageal reflux disease)     Gout     Hx of colonic polyps     Hypercoagulable state     Hyperlipidemia     Hypertension     MI, old 2010    MTHFR mutation     Myocardial infarction     9/2010    Osteomyelitis of ankle or foot 03/09/2017    Prostate cancer 06/2016    Sleep apnea     Squamous cell " carcinoma 01/23/2018    Left helix, imiquimod    Supratherapeutic INR 4/19/2023    Venous stasis     Chronic       PAST SURGICAL HISTORY:     Past Surgical History:   Procedure Laterality Date    ABDOMINAL SURGERY      CARDIAC SURGERY      3 STENTS     CAUDAL EPIDURAL STEROID INJECTION N/A 6/22/2020    Procedure: INJECTION, STEROID, SPINE, EPIDURAL, CAUDAL;  Surgeon: Evangelist Bose MD;  Location: [facility] OR;  Service: Pain Management;  Laterality: N/A;    COLONOSCOPY  02/2011    diverticulosis with diverticula with clot, no active bleeding    COLONOSCOPY N/A 8/24/2016    Procedure: COLONOSCOPY;  Surgeon: Saroj Borjas MD;  Location: [facility] ENDO;  Service: Endoscopy;  Laterality: N/A;    COLONOSCOPY N/A 11/18/2020    Procedure: COLONOSCOPY;  Surgeon: Saroj Borjas MD;  Location: [facility] ENDO;  Service: Endoscopy;  Laterality: N/A;    COLONOSCOPY N/A 10/27/2021    Procedure: COLONOSCOPY;  Surgeon: Saroj Borjas MD;  Location: [facility] ENDO;  Service: Endoscopy;  Laterality: N/A;    CREATION, COLOSTOMY, LAPAROSCOPIC N/A 4/25/2023    Procedure: CREATION, COLOSTOMY, LAPAROSCOPIC;  Surgeon: Mark Martinez Jr., MD;  Location: [facility] OR;  Service: General;  Laterality: N/A;    DEBRIDEMENT OF SACRAL WOUND N/A 4/20/2023    Procedure: DEBRIDEMENT, WOUND, SACRUM;  Surgeon: Mark Martinez Jr., MD;  Location: [facility] OR;  Service: General;  Laterality: N/A;    EPIDURAL STEROID INJECTION INTO LUMBAR SPINE N/A 6/22/2020    Procedure: Injection-steroid-epidural-lumbar;  Surgeon: Evangelist Bose MD;  Location: [facility] OR;  Service: Pain Management;  Laterality: N/A;  L3 L4 L5 S1    EPIDURAL STEROID INJECTION INTO LUMBAR SPINE N/A 9/21/2020    Procedure: Injection-steroid-epidural-lumbar;  Surgeon: Evangelist Bose MD;  Location: [facility] OR;  Service: Pain Management;  Laterality: N/A;  L5-S1    FUSION, SPINE, CERVICAL N/A 3/24/2023    Procedure: FUSION, SPINE, CERVICAL;  Surgeon: Kike Kc DO;  Location: [facility];  Service:  Neurosurgery;  Laterality: N/A;  posterior cervical decompression/fusion C3-T1    GASTRIC BYPASS  2/5/2008    IVC FILTER RETRIEVAL      JOINT REPLACEMENT  1996 and 2001    bi-lat hip replacement/Rt Hip and Lt Hip    RADIOFREQUENCY ABLATION OF LUMBAR MEDIAL BRANCH NERVE AT SINGLE LEVEL Bilateral 1/10/2020    Procedure: Radiofrequency Ablation, Nerve, Spinal, Lumbar, Medial Branch, 1 Level;  Surgeon: Evangelist Bose MD;  Location: [facility] OR;  Service: Pain Management;  Laterality: Bilateral;  L3,L4,L5    RADIOFREQUENCY ABLATION OF LUMBAR MEDIAL BRANCH NERVE AT SINGLE LEVEL Bilateral 11/23/2021    Procedure: Radiofrequency Ablation, Nerve, Spinal, Lumbar, Medial Branch, Bilateral L 3,4,5;  Surgeon: Nile Causey MD;  Location: [facility] OR;  Service: Pain Management;  Laterality: Bilateral;    ROTATOR CUFF REPAIR      Rt shoulder    Stents  8/18/2010    x 3    UPPER GASTROINTESTINAL ENDOSCOPY  02/2011       ALLERGIES:   Donepezil, Bactroban [mupirocin calcium], and Shellfish containing products    FAMILY HISTORY:     Family History   Problem Relation Age of Onset    Heart attack Mother     Heart attack Father     Heart attack Brother     Ulcerative colitis Daughter 35    Lupus Daughter     Colon cancer Neg Hx     Colon polyps Neg Hx     Crohn's disease Neg Hx     Melanoma Neg Hx     Psoriasis Neg Hx     Eczema Neg Hx        SOCIAL HISTORY:     Social History     Tobacco Use    Smoking status: Former    Smokeless tobacco: Never    Tobacco comments:     45 yrs ago, only smoked 3-4 packs in his entire life as a teenager   Substance Use Topics    Alcohol use: Not Currently     Comment: rare        Social History     Substance and Sexual Activity   Sexual Activity Not Currently        HOME MEDICATIONS:     Prior to Admission medications    Medication Sig Start Date End Date Taking? Authorizing Provider   aspirin (ECOTRIN LOW STRENGTH) 81 MG EC tablet Take 1 tablet by mouth. 3/29/23  Yes Historical Provider   allopurinoL (ZYLOPRIM)  100 MG tablet Take 1 tablet (100 mg total) by mouth every evening. 1/26/23   Familia Ramirez Jr., MD   amiodarone (PACERONE) 200 MG Tab Take 1 tablet (200 mg total) by mouth 2 (two) times daily. 3/28/23 3/27/24  Crystal Casanova NP   atorvastatin (LIPITOR) 80 MG tablet Take 1 tablet (80 mg total) by mouth once daily. 1/26/23   Familia Ramirez Jr., MD   calcitRIOL (ROCALTROL) 0.5 MCG Cap Take 1 capsule (0.5 mcg total) by mouth once daily. 4/3/23   Adeline Hammonds NP   ceftriaxone sodium (CEFTRIAXONE 2 G  ML D5W) 2 g/100 mL IVPB Inject 100 mLs (2 g total) into the vein once daily. 4/28/23 6/7/23  Luis Alfredo Seals MD   ciclopirox (PENLAC) 8 % Soln Apply 1 application topically nightly. 4/10/23   Historical Provider   clopidogreL (PLAVIX) 75 mg tablet Take 1 tablet by mouth once daily. 3/29/23   Historical Provider   famotidine (PEPCID) 40 MG tablet Take 1 tablet (40 mg total) by mouth once daily. 1/26/23 1/26/24  Familia Ramirez Jr., MD   ferrous sulfate (FEROSUL) 325 mg (65 mg iron) Tab tablet TAKE 1 TABLET BY MOUTH TWICE DAILY 1/26/23   Familia Ramirez Jr., MD   fluticasone propionate (FLONASE) 50 mcg/actuation nasal spray 1 spray by Each Nostril route once daily. 3/29/23   Historical Provider   insulin aspart U-100 (NOVOLOG) 100 unit/mL (3 mL) InPn pen Inject into the skin 3 (three) times daily with meals. 4/11/23   Historical Provider   lisinopriL (PRINIVIL,ZESTRIL) 40 MG tablet Take 40 mg by mouth once daily. 4/27/23   Historical Provider   mecobal-levomefolat Ca-B6 phos (FOLTANX) 3-35-2 mg Tab Take 1 tablet by mouth once daily.    Historical Provider   metronidazole (FLAGYL) 500 mg/100 mL IVPB Inject 100 mLs (500 mg total) into the vein every 8 (eight) hours. 4/28/23 6/7/23  Luis Alfredo Seals MD   mirabegron (MYRBETRIQ) 50 mg Tb24 Take 1 tablet by mouth once daily. 3/29/23   Historical Provider   nitroGLYCERIN 0.4 MG/DOSE TL SPRY (NITROLINGUAL) 400 mcg/spray spray Place 1 spray under the  "tongue every 5 (five) minutes as needed. 3/28/23   Historical Provider   omeprazole (PRILOSEC) 20 MG capsule Take 20 mg by mouth once daily. 3/29/23   Historical Provider   traZODone (DESYREL) 50 MG tablet Take 1 tablet by mouth every evening. 4/17/23   Historical Provider   triamcinolone acetonide 0.1% (KENALOG) 0.1 % cream APPLY TO THE AFFECTED AREA(S) TWICE DAILY  Patient taking differently: Apply 1 g topically 2 (two) times daily. APPLY TO THE AFFECTED AREA(S) TWICE DAILY 5/1/23   Alana Brown MD   warfarin (COUMADIN) 5 MG tablet Take 1 tablet (5 mg total) by mouth Daily. 4/3/23   Adeline Hammonds NP       PHYSICAL EXAM:     /65   Pulse 70   Temp 98.8 °F (37.1 °C) (Oral)   Resp 19   Ht 5' 8" (1.727 m)   Wt 107.5 kg (237 lb)   SpO2 97%   BMI 36.04 kg/m²     Gen: alert, responsive  HEENT:  Eyes - no pallor  External ears with no lesions  Nares patent  Mouth/Throat:  trachea midline  CV: RRR  Lungs:  DECREASED BS  Abd: +BS, soft, NT, ND  :  WRIGHT, EXTENSIVE SACRAL ULCER EXTENDING INTO PERINEUM, NO CREPITUS, NO PAIN OUT OF PROPORTION TO EXAM, NO CURRENT DRAINAGE  Ext: HEEL WOUND WITH DRAINAGE  Skin: warm, dry  Neuro: ALERT  Psych: pleasant     LABS AND IMAGING:     Labs Reviewed   CBC W/ AUTO DIFFERENTIAL - Abnormal; Notable for the following components:       Result Value    WBC 14.71 (*)     RBC 2.70 (*)     Hemoglobin 8.7 (*)     Hematocrit 26.2 (*)     MCH 32.2 (*)     RDW 17.0 (*)     Platelets 457 (*)     Immature Granulocytes 0.9 (*)     Gran # (ANC) 12.4 (*)     Immature Grans (Abs) 0.13 (*)     Mono # 1.1 (*)     Gran % 84.1 (*)     Lymph % 6.9 (*)     All other components within normal limits   COMPREHENSIVE METABOLIC PANEL - Abnormal; Notable for the following components:    Sodium 135 (*)     Glucose 122 (*)     BUN 40 (*)     Albumin 2.4 (*)     Alkaline Phosphatase 292 (*)     AST 55 (*)     ALT 51 (*)     Anion Gap 7 (*)     eGFR 52.4 (*)     All other components within " normal limits   URINALYSIS, REFLEX TO URINE CULTURE - Abnormal; Notable for the following components:    Color, UA Red (*)     Appearance, UA Cloudy (*)     Protein, UA 2+ (*)     Occult Blood UA 3+ (*)     Leukocytes, UA 1+ (*)     All other components within normal limits    Narrative:     Specimen Source->Urine   TROPONIN I HIGH SENSITIVITY - Abnormal; Notable for the following components:    Troponin I High Sensitivity 21.1 (*)     All other components within normal limits   PROCALCITONIN - Abnormal; Notable for the following components:    Procalcitonin 0.73 (*)     All other components within normal limits   SEDIMENTATION RATE - Abnormal; Notable for the following components:    Sed Rate 81 (*)     All other components within normal limits   C-REACTIVE PROTEIN - Abnormal; Notable for the following components:    CRP 6.79 (*)     All other components within normal limits   URINALYSIS MICROSCOPIC - Abnormal; Notable for the following components:    RBC, UA >100 (*)     WBC, UA 42 (*)     Hyaline Casts,  (*)     All other components within normal limits    Narrative:     Specimen Source->Urine   TROPONIN I HIGH SENSITIVITY - Abnormal; Notable for the following components:    Troponin I High Sensitivity 22.1 (*)     All other components within normal limits   CULTURE, BLOOD   CULTURE, BLOOD   CULTURE, URINE   CULTURE, AEROBIC  (SPECIFY SOURCE)   MAGNESIUM   PHOSPHORUS   LACTIC ACID, PLASMA   PROCALCITONIN   SEDIMENTATION RATE   C-REACTIVE PROTEIN   HEMOGLOBIN A1C       Imaging Results              CTA Chest Non-Coronary (PE Studies) (Final result)  Result time 06/05/23 21:57:33      Final result by Maximiliano Martin MD (06/05/23 21:57:33)                   Narrative:    EXAMINATION(S):  CT CHEST ANGIOGRAPHY WITH IV CONTRAST    COMPARISON: Portable chest x-ray June 5, 2023.    INDICATION: Pulmonary embolism (PE) suspected, high prob    TECHNIQUE: CT angiography of the chest was performed with the administration  of intravenous contrast media in order to evaluate the pulmonary arteries for pulmonary embolus. A 100 mL Omnipaque 350 bolus injection of water soluble contrast was administered intravenously followed by thin section cuts through the chest. This was followed by imaging post-processing with 3-D reconstruction of the pulmonary arteries in the coronal and sagittal planes with images stored in the patient's permanent medical record. All CT scans at this facility use dose modulation, iterative reconstruction and/or weight based dosing when appropriate to reduce radiation dose to as low as reasonably achievable.    FINDING(S):  Pulmonary arteries: The contrast bolus is diagnostic. No pulmonary emboli are identified. No right heart strain is evident.  Thoracic aorta: Thoracic aortic atherosclerosis is noted. Mild aneurysmal dilation of the ascending thoracic aorta measures 4.4 cm transverse at the main pulmonary artery level. The thoracic aorta tapers down to a normal caliber at the aortic arch level. No aortic dissection is evident.  Heart: The heart is enlarged. A small pericardial effusion is noted at the anterior base. Coronary artery atherosclerotic calcification and/or stent graft artifact is present.  Mediastinum and hilum: No mediastinal or hilar mass or pathologic lymph node enlargement is evident.  Pulmonary: The trachea and major central airways appear patent. Small to moderate sized bilateral posterior layering pleural effusions are associated with moderate compressive atelectasis. No pulmonary edema is evident. No pneumothorax is evident.  Bones: No acute bony pathology is evident. Cervical thoracic junction laminectomy and posterior fusion changes are noted along with multilevel degenerative changes.  Body wall and axilla: No acute body wall pathology. A left-sided PICC line is noted.    IMPRESSION:    1. No evidence of acute pulmonary emboli.    2. Mild aneurysmal dilation of the ascending thoracic aorta  measures 4.4 cm.    3. A small pericardial effusion is present.    4. Bilateral posterior layering pleural effusions are associated with bibasilar compressive atelectasis. Superimposed pneumonia is not excluded.    Electronically signed by:  Maximiliano Martin MD  6/5/2023 9:57 PM CDT Workstation: [id number]                                     CT Abdomen Pelvis With Contrast (Final result)  Result time 06/05/23 21:52:30      Final result by Maximiliano Martin MD (06/05/23 21:52:30)                   Narrative:    EXAMINATION: CT ABDOMEN PELVIS WITH IV CONTRAST    COMPARISON: None available.    INDICATION: Abdominal abscess/infection suspected; sacral decub    FINDING(S): During the uneventful dynamic intravenous administration of 100 mL Omnipaque 350 water-soluble contrast, routine spiral 2.0 mm axial tomograms are obtained from the lung bases through the femoral trochanters. Supplemental coronal and sagittal reconstruction imaging is also created. All CT scans at this facility use dose modulation, iterative reconstruction and/or weight based dosing when appropriate to reduce radiation dose to as low as reasonably achievable.    FINDINGS:  Liver: No acute findings.  Gallbladder and biliary tract: Cholelithiasis in the gallbladder is mildly dilated measuring approximately 4.4 cm transverse. No associated gallbladder wall thickening or pericholecystic fluid is seen. No bile duct dilation is evident.  Pancreas: No acute findings.  Spleen: No splenomegaly.  Adrenal glands: No acute findings.  Kidneys, ureters and urinary bladder: Multiple bilateral variable sized renal cortical cystic foci are noted. Most appear simple/benign and requires no imaging follow-up. Some are too small to characterize. There is a well-defined 4.1 cm exophytic right lower pole cystic appearing focus measuring approximately 20 Hounsfield units with thin peripheral mural calcification present in keeping with a Bosniak 2 type cyst. An oval fairly  well-defined 6.5 cm exophytic mass arising from the left lower pole measures near 90 Hounsfield units. This is likely a benign hyperdense cyst but follow-up is recommended to confirm. The urinary bladder is poorly distended with a Ruelas catheter in place and mild circumferential wall thickening which is likely artifact. Gas in the dome of the bladder is likely iatrogenic.  Reproductive organs: No acute findings.  Stomach and duodenum: Post bariatric surgery changes are noted. No acute gastric or duodenal pathology is evident.  Small bowel: No acute findings.  Appendix: No acute findings.  Large bowel: The anterior left pelvic level cholostomy is benign appearing. No large bowel obstruction is evident.  Lymph nodes: No pathologic lymph node enlargement is identified.  Vasculature: Moderate atherosclerotic change. No aneurysm formation. An IVC filter is in satisfactory position.  Peritoneal space: A solitary thin ovoid pocket of free intraperitoneal gas is noted anterior to anterior upper abdominal small bowel, axial image 87. No other pneumoperitoneum is evident. No ascites is evident.  Bones/joints: A sacral decubitus ulcer is present extending down to the inferior coccyx with associated bony erosive changes in keeping with acute osteomyelitis. Bilateral hip prosthesis artifact is noted.  Body wall: A multiloculated lower anterior right pelvic region subcutaneous collection is near fluid density. This overlying soft tissue stranding and skin thickening. This could represent an abscess collection or infected hematoma or seroma    IMPRESSION:  1. A solitary very small pocket of intraperitoneal gas is noted anterior to upper abdominal small bowel loops. This is of uncertain etiology or significance. No other pneumoperitoneum is identified.  2. Cholelithiasis is noted with mild distention of the gallbladder. The distention may be physiologic from an n.p.o. status but clinical correlation and follow-up is recommended to  rule out acute cholecystitis.  3. Multiple bilateral renal cortical cystic foci are simple or benign-appearing. An exophytic left lower pole well-defined mass measures near 90 Hounsfield units, likely representing a hyperdense cyst. Correlation with renal ultrasound is respectfully recommended to rule out a solid mass.  4. An IVC filter is present on the study. It is recommended that all patients with IVC filters in place have an active management care plan to monitor the IVC filter. If the care plan is not in place, it is recommended this patient be referred to an interventional provider for evaluation and establishment of an IVC filter management care plan.  5. A sacral decubitus ulcer extends down to the coccyx with associated bony erosions in keeping with acute osteomyelitis.  6. A lobulated possibly cystic appearing subcutaneous fat collection in the anterior right pelvis is associated with overlying cellulitis type changes. Clinical correlation and follow-up is recommended to rule out an abscess or infected hematoma or seroma. Ultrasound guidance could be used for tissue sampling if clinically indicated.      Electronically signed by:  Maximiliano Martin MD  6/5/2023 9:52 PM CDT Workstation: [id number]                                     X-Ray Ankle Complete Right (Final result)  Result time 06/05/23 18:49:06      Final result by Lisha Lopez DO (06/05/23 18:49:06)                   Narrative:    Right ankle radiographs: 6/5/2023 6:48 PM CDT    HISTORY:  77 years  old Male with .    COMPARISON: None available    TECHNIQUE: AP, lateral, mortise views of the right ankle were obtained.    FINDINGS: There are no findings to suggest an acute fracture or subluxation within the right tibia. There is likely a remote fracture deformity through the distal right fibula. There is also abnormal lucency extending across the distal right fibula concerning for an acute fracture There is degenerative change seen at the mortise  joint. There is an incidental calcaneal heel spur. There is a compression plate seen at the first metatarsal partially visualized.    No gross radiopaque foreign body is seen. There is atherosclerotic calcification seen at the arteries. There is mild Achilles enthesopathy.    IMPRESSION: There is irregularity through the distal fibula extending to the mortise joint concerning for an acute, nondisplaced fracture. There may also be a remote fracture through the distal fibula.    Electronically signed by:  Lisha Lopez DO  6/5/2023 6:49 PM CDT Workstation: [id number]                                     X-Ray Chest 1 View (Final result)  Result time 06/05/23 17:23:55      Final result by Lisha Lopez DO (06/05/23 17:23:55)                   Narrative:    AP chest radiograph: 6/5/2023 5:23 PM CDT    History: 77 years  old Male with Sepsis.    Comparison: 4/26/2023    Findings: The cardiomediastinal silhouette is enlarged.    A left upper extremity PICC line catheter tip projects at the brachiocephalic/SVC region.    No pneumothorax is seen.    There is multilevel posterior fusion hardware at the cervicothoracic spine.    No acute airspace opacities are seen.    No discrete pleural effusion is apparent.    Impression: No acute airspace opacities are seen.    Electronically signed by:  Lisha Lopez DO  6/5/2023 5:23 PM CDT Workstation: [id number]                                    Labs and images reviewed personally by me.  See my personal assessment/interpretation of results below:    ASSESSMENT & PLAN:   Richard Bustos is a 77 y.o. male admitted for    Active Hospital Problems    Diagnosis  POA    *Osteomyelitis [M86.9]  Yes    HFrEF (heart failure with reduced ejection fraction) [I50.20]  Yes    Scrotal injury [S39.94XA]  Yes    Complex renal cyst [N28.1]  Not Applicable    EMMY (acute kidney injury) [N17.9]  Yes     Chronic    Paroxysmal atrial fibrillation [I48.0]  Yes     EKG stable  Stable on Coreg, no missed  doses of anticoagulation. Continue anticoagulation as described below      LV dysfunction, EF 40% [I51.9]  Yes     Last Echo 5/2020  Repeat echo stable  Continue lisinopril and Coreg as now  Monitor      CKD (chronic kidney disease) stage 3, GFR 30-59 ml/min, 41 [N18.30]  Yes    Aortic aneurysm, thoracic, 4.3 cm, 8/2010 [I71.20]  Yes     Chronic     Measurement of aortic root appears stable- has not had CTA chest to monitor.  Will discuss with Dr. Soto and will recommend CT chest to follow      Diabetes mellitus with chronic kidney disease, stage III [E11.22]  Yes     Chronic    MTHFR mutation (methylenetetrahydrofolate reductase) [Z15.89]  Not Applicable    Sleep apnea, using BiPAP nightly, 1999, last sleep test in 2013 [G47.30]  Yes     Chronic    Hypertension associated with diabetes [E11.59, I15.2]  Yes    CAD (coronary artery disease) [I25.10]  Yes    GERD (gastroesophageal reflux disease) [K21.9]  Yes      Resolved Hospital Problems   No resolved problems to display.        Plan    Sacral osteomyelitis  Wound cx pseudomonas, enterobacter, e.faecalis  UTI  - concern for scrotal necrotizing infection per LT[facility]--on my exam, no crepitus, no necrotic tissue, no pain out of proportion, CT without gas in soft tissues.  - other CT findings as noted above  - vanc, merrem per cultures results  - blood and wound cultures repeated  - wound care  - replace mcdaniels  - general surgery consulted in ER and is following, thank you  - ID consulted, thank you    Acute hypoxemic respiratory failure  DORA, +likely OHS  - supplemental oxygen, wean as tolerated  - compressive atelectasis on CTA  - also possible pneumonia per CT, antibiotics above will cover  - repositioning pt  - BIPAP QHS    Chronically Anticoagulated   - holding warfarin due to supratherapeutic INR  - trending INR    DM2  - SSI    Malnutrition  - nutrition consulted    Complex renal cyst  - renal US    Chronic conditions as noted above/below; home medications  reviewed personally by me and restarted as appropriate  Electrolyte derangement:  Trending BMP; Mg; replacement prn  DVT ppx: home warfarin held; trending INR  FULL CODE      - The above conditions include an acute and/or chronic illness that poses a threat to life (or bodily function).  - Previous notes/encounters/external records reviewed personally by me.  - Pt's case personally discussed with another physician:  ER physician    Sailaja Grey MD  [facility] Hospitalist  06/05/2023

## 2023-06-06 NOTE — HOSPITAL COURSE
Seen and examined   Afebrile . Urine clearing in the tube .  INR better  D/w family long time . D/w case Mx . 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  This morning patient is doing well with no complaints.  Remains on IV antibiotics.  Awaiting discharge to LTAC.       zosyn for pseudomonas in blood culture  and total 14 days  Micagungin for yeast in blood culture and total 14 days    follow final yeast identification   Contact isolation precautions  Wound care for sacram  Wound. No treating for that   Hematuria resolved and resumed Coumadin 4 mg hs and may need to increased back gradually . DC with mcdaniels and uro wants to keep atleast 1 week .   PO Lasix 20 mg b.i.d. and following volume status  S/p IV iron  Appear he is on triple therapy at home . Had cardiac stent but not recently and hold aspirin  ECHO negative for infection signs. D/w cardiology      Medically cleared for transfer to LTAC  Patient need to follow up with [facility] in LTAC . Dr Long communicated   Old PICC removed and got new midline   Patient discharged to LTAC on June 12.

## 2023-06-06 NOTE — PLAN OF CARE
[facility]  Initial Discharge Assessment       Primary Care Provider: Familia Ramirez Jr, MD    Admission Diagnosis: Osteomyelitis [M86.9]    Admission Date: 6/5/2023  Expected Discharge Date:      met with Pt at bedside to complete discharge assessment. Pt AAOx4s. Demographics, PCP, and insurance verified.  also spoke to Pt's daughter (Citlali Mora (Healthcare Power of ) [phone] (Mobile)) to verify Pt information. Pt presented to hospital from [facility]. No home health. No dialysis. Pt and Pt's daughter reports Pt's ability to complete ADLs with assistance of a quad cane, a rollator, BiPAP, and the assistance of his daughter for bathing and dressing. Pt takes coumadin; labs monitored by [facility] [place]. Pt's daughter verbalized plan for Pt to discharge back to LTAC at [facility]. Pt has no other needs to be addressed at this time.    Transition of Care Barriers: None    Payor: [employer]ARE / Plan: [employer] SECURE COMPLETE / Product Type: Medicare Advantage /     Extended Emergency Contact Information  Primary Emergency Contact: Citlali Mora  Address: [address] [place] [address] [place]  Home Phone: [phone]  Mobile Phone: [phone]  Relation: Healthcare Power of   Preferred language: English   needed? No  Secondary Emergency Contact: Richard Bustos jr  Address: [address] [place] [id number] [place]  Mobile Phone: [phone]  Relation: Son  Preferred language: English   needed? No    Discharge Plan A: Long-term acute care facility (LTAC)  Discharge Plan B: Long-term acute care facility (LTAC)    No Pharmacies Listed    Initial Assessment (most recent)       Adult Discharge Assessment - 06/06/23 1122          Discharge Assessment    Assessment Type Discharge Planning Assessment      Confirmed/corrected address, phone number and insurance Yes     Confirmed Demographics Correct on Facesheet     Source of Information patient     Does patient/caregiver understand observation status Yes     Communicated ORACIO with patient/caregiver Yes     Reason For Admission Osteomyelitis     People in Home child(monty), adult     Facility Arrived From: [facility]     Do you expect to return to your current living situation? Yes     Do you have help at home or someone to help you manage your care at home? Yes     Who are your caregiver(s) and their phone number(s)? Citlali Mora (Healthcare Power of )   [phone] (Mobile)     Prior to hospitilization cognitive status: Alert/Oriented     Current cognitive status: Alert/Oriented     Walking or Climbing Stairs ambulation difficulty, requires equipment;stair climbing difficulty, requires equipment;transferring difficulty, requires equipment     Mobility Management Rollator, quad cane     Dressing/Bathing bathing difficulty, assistance 1 person;dressing difficulty, assistance 1 person     Home Accessibility wheelchair accessible     Home Layout Able to live on 1st floor     Equipment Currently Used at Home BIPAP;cane, quad;rollator     Readmission within 30 days? No     Patient currently being followed by outpatient case management? No     If yes, name of outpatient case management following: --     Do you currently have service(s) that help you manage your care at home? No     Do you take prescription medications? Yes     Do you have prescription coverage? Yes     Coverage Payor:  [employer] - [facility]     Do you have any problems affording any of your prescribed medications? No     Is the patient taking medications as prescribed? yes     Who is going to help you get home at discharge? Citlali Mora (Healthcare Power of )   [phone] (Mobile)     How do you get to doctors  appointments? family or friend will provide     Are you on dialysis? No     Do you take coumadin? Yes     Who monitors your labs? [facility]     Discharge Plan A Long-term acute care facility (LTAC)     Discharge Plan B Long-term acute care facility (LTAC)     DME Needed Upon Discharge  none     Discharge Plan discussed with: Patient;Adult children     Transition of Care Barriers None        OTHER    Name(s) of People in Home Citlali Mora (Healthcare Power of )   [phone] (Mobile)

## 2023-06-06 NOTE — CONSULTS
Consult Note  Infectious Disease    Reason for Consult:  ID judy    HPI: Richard Bustos is a 77 y.o. male known to our service for treatment of stage 4 sacral decubitus, surgically debrided 4/20 with diverting colostomy 4/25 then treated at [facility] LTAC through 5/31 then to TCU in same facility. He completed 4+ weeks of broad spectrum antibiotics, adjusted part way through for new tissue cultures with pseudomonas/enterobacter, with history of long term augmentin(6 weeks prior to that admission) for possible osteomyelitis of foot ulcer. He was sent to the ED for concern of Halle's gangrene as scrotal wounds were felt to be worse. He has been CT scanned, seen by wound care and general surgery. Numerous wound photos were reviewed and d/w wound care RN. Wound care MD intends to debride a bit tomorrow and wound vac will be replaced to sacrum.  He submitted superficial swab cultures of the scrotum and sacrum. Additionally he has had gross hematuria the entire time he as at LTAC and SNF, worse with elevated INR,  on coumadin for history of MTHFR mutation, history of prostate cancer, status unknown.     Review of patient's allergies indicates:   Allergen Reactions    Donepezil Other (See Comments)     AMS    Bactroban [mupirocin calcium] Blisters     Causes Blisters    Shellfish containing products Other (See Comments)     Other reaction(s): Gout  OYSTERS     Past Medical History:   Diagnosis Date    Anemia     Anemia of other chronic disease 09/13/2017    Anemia, chronic renal failure 09/13/2017    Anemia, unspecified 09/13/2017    Anticoagulant long-term use     Aorta aneurysm     Arthritis     Atrial fibrillation     Bacteremia due to Streptococcus 01/08/2022    CAD (coronary artery disease)     CHF (congestive heart failure)     Chronic kidney disease     Clotting disorder 09/13/2017    Colon polyp     Dementia     Diabetes mellitus     not on meds    Diabetes mellitus type II     Diverticulosis     Elevated PSA      Encounter for blood transfusion     Former smoker     General anesthetics causing adverse effect in therapeutic use     TROUBLE COMING OUT OF ANESTHESIA WITH GASTRIC BYPASS    GERD (gastroesophageal reflux disease)     Gout     Hx of colonic polyps     Hypercoagulable state     Hyperlipidemia     Hypertension     MI, old 2010    MTHFR mutation     Myocardial infarction     9/2010    Osteomyelitis of ankle or foot 03/09/2017    Prostate cancer 06/2016    Sleep apnea     Squamous cell carcinoma 01/23/2018    Left helix, imiquimod    Supratherapeutic INR 4/19/2023    Venous stasis     Chronic     Past Surgical History:   Procedure Laterality Date    ABDOMINAL SURGERY      CARDIAC SURGERY      3 STENTS     CAUDAL EPIDURAL STEROID INJECTION N/A 6/22/2020    Procedure: INJECTION, STEROID, SPINE, EPIDURAL, CAUDAL;  Surgeon: Evangelist Bose MD;  [facility];  Service: Pain Management;  Laterality: N/A;    COLONOSCOPY  02/2011    diverticulosis with diverticula with clot, no active bleeding    COLONOSCOPY N/A 8/24/2016    Procedure: COLONOSCOPY;  Surgeon: Saroj Borjas MD;  Location: [facility];  Service: Endoscopy;  Laterality: N/A;    COLONOSCOPY N/A 11/18/2020    Procedure: COLONOSCOPY;  Surgeon: Saroj Borjas MD;  Location: [facility];  Service: Endoscopy;  Laterality: N/A;    COLONOSCOPY N/A 10/27/2021    Procedure: COLONOSCOPY;  Surgeon: Saroj Borjas MD;  Location: [facility];  Service: Endoscopy;  Laterality: N/A;    CREATION, COLOSTOMY, LAPAROSCOPIC N/A 4/25/2023    Procedure: CREATION, COLOSTOMY, LAPAROSCOPIC;  Surgeon: Mark Martinez Jr., MD;  Location: [facility] OR;  Service: General;  Laterality: N/A;    DEBRIDEMENT OF SACRAL WOUND N/A 4/20/2023    Procedure: DEBRIDEMENT, WOUND, SACRUM;  Surgeon: Mark Martinez Jr., MD;  Location: [facility] OR;  Service: General;  Laterality: N/A;    EPIDURAL STEROID INJECTION INTO LUMBAR SPINE N/A 6/22/2020    Procedure: Injection-steroid-epidural-lumbar;   Surgeon: Evangelist Bose MD;  [facility];  Service: Pain Management;  Laterality: N/A;  L3 L4 L5 S1    EPIDURAL STEROID INJECTION INTO LUMBAR SPINE N/A 9/21/2020    Procedure: Injection-steroid-epidural-lumbar;  Surgeon: Evangelist Bose MD;  [facility];  Service: Pain Management;  Laterality: N/A;  L5-S1    FUSION, SPINE, CERVICAL N/A 3/24/2023    Procedure: FUSION, SPINE, CERVICAL;  Surgeon: Kike Kc DO;  Location: [facility] OR;  Service: Neurosurgery;  Laterality: N/A;  posterior cervical decompression/fusion C3-T1    GASTRIC BYPASS  2/5/2008    IVC FILTER RETRIEVAL      JOINT REPLACEMENT  1996 and 2001    bi-lat hip replacement/Rt Hip and Lt Hip    RADIOFREQUENCY ABLATION OF LUMBAR MEDIAL BRANCH NERVE AT SINGLE LEVEL Bilateral 1/10/2020    Procedure: Radiofrequency Ablation, Nerve, Spinal, Lumbar, Medial Branch, 1 Level;  Surgeon: Evangelist Bose MD;  Location: [facility] OR;  Service: Pain Management;  Laterality: Bilateral;  L3,L4,L5    RADIOFREQUENCY ABLATION OF LUMBAR MEDIAL BRANCH NERVE AT SINGLE LEVEL Bilateral 11/23/2021    Procedure: Radiofrequency Ablation, Nerve, Spinal, Lumbar, Medial Branch, Bilateral L 3,4,5;  Surgeon: Nile Causey MD;  [facility];  Service: Pain Management;  Laterality: Bilateral;    ROTATOR CUFF REPAIR      Rt shoulder    Stents  8/18/2010    x 3    UPPER GASTROINTESTINAL ENDOSCOPY  02/2011     Social History     Socioeconomic History    Marital status:    Tobacco Use    Smoking status: Former    Smokeless tobacco: Never    Tobacco comments:     45 yrs ago, only smoked 3-4 packs in his entire life as a teenager   Substance and Sexual Activity    Alcohol use: Not Currently     Comment: rare    Drug use: No    Sexual activity: Not Currently     Social Determinants of Health     Financial Resource Strain: Low Risk     Difficulty of Paying Living Expenses: Not hard at all   Food Insecurity: No Food Insecurity    Worried About Running Out of Food in the Last  Year: Never true    Ran Out of Food in the Last Year: Never true   Transportation Needs: No Transportation Needs    Lack of Transportation (Medical): No    Lack of Transportation (Non-Medical): No   Physical Activity: Inactive    Days of Exercise per Week: 0 days    Minutes of Exercise per Session: 0 min   Stress: Stress Concern Present    Feeling of Stress : Very much   Social Connections: Moderately Isolated    Frequency of Communication with Friends and Family: Three times a week    Frequency of Social Gatherings with Friends and Family: Three times a week    Attends Bahai Services: 1 to 4 times per year    Active Member of Clubs or Organizations: No    Attends Club or Organization Meetings: Never    Marital Status:    Housing Stability: Low Risk     Unable to Pay for Housing in the Last Year: No    Number of Places Lived in the Last Year: 1    Unstable Housing in the Last Year: No     Family History   Problem Relation Age of Onset    Heart attack Mother     Heart attack Father     Heart attack Brother     Ulcerative colitis Daughter 35    Lupus Daughter     Colon cancer Neg Hx     Colon polyps Neg Hx     Crohn's disease Neg Hx     Melanoma Neg Hx     Psoriasis Neg Hx     Eczema Neg Hx          Review of Systems: he has had chills but no fever. He is bed bound. Breathing comfortably.       EXAM & DIAGNOSTICS REVIEWED:   Vitals:     Temp:  [97.3 °F (36.3 °C)-98.8 °F (37.1 °C)]   Temp: 97.3 °F (36.3 °C) (06/06/23 1146)  Pulse: 67 (06/06/23 1045)  Resp: 18 (06/06/23 1146)  BP: (!) 152/84 (06/06/23 1146)  SpO2: (!) 92 % (06/06/23 1146)    Intake/Output Summary (Last 24 hours) at 6/6/2023 1544  Last data filed at 6/6/2023 1235  Gross per 24 hour   Intake 1028.65 ml   Output 1000 ml   Net 28.65 ml       General:  In NAD. Alert and attentive, cooperative, comfortable  Eyes:  Anicteric,   EOMI , resolving facial ecchymosis  ENT:  No ulcers, exudates, thrush, nares patent,    Neck:  supple,   Lungs: Clear, no  consolidation, rales, wheezes, rub  Heart:  RRR, no gallop/murmur/rub noted  Abd:  Soft, obese, LLQ ostomy with yellow stool, NT, ND, normal BS, no masses or organomegaly appreciated.  :   Ruelas, gross hematuria, changed 6/5  Musc:  Joints without effusion, swelling, erythema, synovitis, with generalized muscle wasting.   Skin:  No rashes.    Neuro:             Alert, attentive, speech fluent, face symmetric, moves all extremities, no focal weakness, not Ambulatory  Psych: Calm, cooperative  Lymphatic:      Extrem: Mild extremity edema, without cellulitis,  hands are cool  VAD:   Picc left arm 4/26    Isolation:  contact  Wound:   Erosion on dorsal penis    Base of scrotum    sacrum     Small amount of bone palpable per wound care nurse(this is not new)               General Labs reviewed:  Recent Labs   Lab 06/02/23 0457 06/05/23 1643 06/06/23 0449   WBC 7.32 14.71* 9.08   HGB 7.9* 8.7* 7.1*   HCT 24.2* 26.2* 21.0*    457* 334       Recent Labs   Lab 06/02/23 0458 06/05/23 1643 06/06/23 0449    135* 135*   K 4.1 4.5 3.7    101 103   CO2 27 27 28   BUN 32* 40* 37*   CREATININE 1.2 1.4 1.4   CALCIUM 9.1 9.2 8.6*   PROT 5.4* 6.5 5.0*   BILITOT 0.2 0.4 0.5   ALKPHOS 372* 292* 218*   ALT 70* 51* 36   AST 89* 55* 34     Recent Labs   Lab 06/05/23 1643   CRP 6.79*     Procal 0/73  Lactic normal      Micro:  Microbiology Results (last 7 days)       Procedure Component Value Units Date/Time    Aerobic culture [[id number]] Collected: 06/06/23 1059    Order Status: Sent Specimen: Wound from Sacrum Updated: 06/06/23 1106    Culture, Anaerobic [[id number]] Collected: 06/06/23 1059    Order Status: Sent Specimen: Wound from Sacrum Updated: 06/06/23 1106    Aerobic culture [[id number]] Collected: 06/06/23 1057    Order Status: Sent Specimen: Wound from Perineum Updated: 06/06/23 1105    Culture, Anaerobic [[id number]] Collected: 06/06/23 1057    Order Status: Sent Specimen: Wound from Perineum Updated:  06/06/23 1104    Blood culture x two cultures. Draw prior to antibiotics [[id number]] Collected: 06/05/23 1644    Order Status: Completed Specimen: Blood from Line, PICC Left Cephalic Updated: 06/05/23 2358     Blood Culture, Routine No Growth to date    Narrative:      Aerobic and anaerobic    Blood culture x two cultures. Draw prior to antibiotics [[id number]] Collected: 06/05/23 1643    Order Status: Completed Specimen: Blood from Line, PICC Left Basilic Updated: 06/05/23 2358     Blood Culture, Routine No Growth to date    Narrative:      Aerobic and anaerobic    Aerobic culture [[id number]]     Order Status: Canceled Specimen: Wound from Sacrum     Urine culture [[id number]] Collected: 06/05/23 1657    Order Status: No result Specimen: Urine Updated: 06/05/23 1743            Imaging Reviewed:   CXR   CT abd/pelvis    Cardiology:    IMPRESSION & PLAN   1. S/p treatment for acute osteomyelitis of sacrum    2. Numerous other wounds, including erosions of base of scrotum with no clinical evidence of nikos's gangrene    3. Persistent gross hematuria, for many weeks, on coumadin for MTHFR  4. Numerous other co-morbidities    Recommendations:  See no indication to continue antibiotics for the wounds  Can wait on urine culture before stopping  The superficial swab cultures will be of very limited relevance    Looks like he will need some blood  ?urology consult    Medical Decision Making during this encounter was  [_] Low Complexity  [_] Moderate Complexity  [xx  ] High Complexity

## 2023-06-06 NOTE — PROGRESS NOTES
VANCOMYCIN PHARMACOKINETIC NOTE:  Vancomycin Day # 1    Objective/Assessment:    Diagnosis/Indication for Vancomycin:Skin & Soft Tissue infection      77 y.o., male; Actual Body Weight = 102.7 kg (226 lb 6.6 oz).    The patient has the following labs:  6/6/2023 Estimated Creatinine Clearance: 52.9 mL/min (based on SCr of 1.4 mg/dL). Lab Results   Component Value Date    BUN 40 (H) 06/05/2023     Lab Results   Component Value Date    WBC 14.71 (H) 06/05/2023          Plan:  Adjust vancomycin dose and/or frequency based on the patient's actual weight and renal function:  Initiate Vancomycin 1750 mg IV every 24 hours following 2000 mg loading dose.  Orders have been entered into patient's chart.        Vancomycin trough level has been ordered for 20:00 on 6/8.     Pharmacy will manage vancomycin therapy, monitor serum vancomycin levels, monitor renal function and adjust regimen as necessary.    Thank you for allowing us to participate in this patient's care.     Joanne Naidu 6/6/2023   Department of Pharmacy  Ext [phone]

## 2023-06-06 NOTE — PROGRESS NOTES
"[facility]  Progress Note    Patient Name: Richard Bustos  MRN: [id number]  Patient Class: IP- Inpatient   Admission Date: 6/5/2023  Length of Stay: 1 days  Attending Physician: Deidra Valle MD  Primary Care Provider: Familia Ramirez Jr, MD        Subjective:     Principal Problem:Osteomyelitis        HPI:  Richard Bustos is a 77 y.o. White male   With PMH of as noted below,  Recently treated for sacral osteomyelitis,   S/p diverting colostomy,  who presents with worsening sacral wound.     Pt reports he feels "fine"  He denies pain at this time  Pt's wound recently grew morganella, proteus,  Then later pseudomonas (only intermediate sensitivity to cefepime), and enterobacter cloacae (intermediate sensitivity to zosyn)      Overview/Hospital Course:  Patient admitted from [facility].  Complicated past medical history, reportedly had fall, now bed-bound with multiple decubitus including stage IV sacral decubitus with osteomyelitis, status post diverting ostomy with Ruelas catheter in place, scrotal decubitus, bilateral leg decubitus, CKD stage 3, AFib, MTHFR mutation, on Coumadin, who was transferred to the ED from 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  Reportedly he was on Rocephin and Flagyl.  History of hematuria.  He was admitted and empirically started on vancomycin and meropenem with Infectious Disease, General surgery and wound care evaluation.  Also noted to have possible right fibular fracture, orthopedic evaluated.      Interval History:  See hospital course.  Patient is bed-bound with multiple decubitus including sacral osteomyelitis with diverting ostomy.  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  He denies any shortness of breath.  States he has hematuria.  He is very concerned about bone exposure in his ankle.  Afebrile with T-max 98.8°.  Labs with WBC 9, " hemoglobin 7.1, BUN/creatinine 37/1.4, albumin 2.8.  Previous echo with EF of 45%.  Imaging studies reviewed.  No visitors at bedside.    Review of Systems   HENT:  Negative for trouble swallowing.    Respiratory:  Negative for shortness of breath.    Genitourinary:  Positive for hematuria.   Skin:  Positive for wound.   Neurological:  Positive for weakness.   Objective:     Vital Signs (Most Recent):  Temp: 97.3 °F (36.3 °C) (06/06/23 1146)  Pulse: 67 (06/06/23 1045)  Resp: 18 (06/06/23 1146)  BP: (!) 152/84 (06/06/23 1146)  SpO2: (!) 92 % (06/06/23 1146) Vital Signs (24h Range):  Temp:  [97.3 °F (36.3 °C)-98.8 °F (37.1 °C)] 97.3 °F (36.3 °C)  Pulse:  [64-83] 67  Resp:  [15-29] 18  SpO2:  [90 %-100 %] 92 %  BP: ()/(56-84) 152/84     Weight: 102.7 kg (226 lb 6.6 oz)  Body mass index is 34.43 kg/m².    Intake/Output Summary (Last 24 hours) at 6/6/2023 1550  Last data filed at 6/6/2023 1235  Gross per 24 hour   Intake 1028.65 ml   Output 1000 ml   Net 28.65 ml         Physical Exam  Vitals and nursing note reviewed.   Constitutional:       Comments: Chronically ill-appearing, lying in bed   HENT:      Head: Normocephalic and atraumatic.      Mouth/Throat:      Mouth: Mucous membranes are moist.   Cardiovascular:      Rate and Rhythm: Normal rate and regular rhythm.   Pulmonary:      Comments: On nasal cannula  Abdominal:      Comments: Ostomy in place left lower quadrant   Genitourinary:     Comments: Ruelas in place with reddish urine draining  Skin:     Comments: Left upper extremity PICC   Neurological:      Mental Status: He is alert and oriented to person, place, and time.   Psychiatric:         Mood and Affect: Mood normal.                                 Significant Labs: BMP:   Recent Labs   Lab 06/06/23  0449   GLU 81   *   K 3.7      CO2 28   BUN 37*   CREATININE 1.4   CALCIUM 8.6*   MG 1.7     CBC:   Recent Labs   Lab 06/05/23  1643 06/06/23  0449   WBC 14.71* 9.08   HGB 8.7* 7.1*   HCT  26.2* 21.0*   * 334     CMP:   Recent Labs   Lab 06/05/23  1643 06/06/23  0449   * 135*   K 4.5 3.7    103   CO2 27 28   * 81   BUN 40* 37*   CREATININE 1.4 1.4   CALCIUM 9.2 8.6*   PROT 6.5 5.0*   ALBUMIN 2.4* 1.9*   BILITOT 0.4 0.5   ALKPHOS 292* 218*   AST 55* 34   ALT 51* 36   ANIONGAP 7* 4*     Cardiac Markers: No results for input(s): CKMB, MYOGLOBIN, BNP, TROPISTAT in the last 48 hours.  Lactic Acid:   Recent Labs   Lab 06/05/23  1649   LACTATE 0.9     Magnesium:   Recent Labs   Lab 06/05/23 1643 06/06/23  0449   MG 1.8 1.7       Significant Imaging: I have reviewed all pertinent imaging results/findings within the past 24 hours.    X-Ray Chest 1 View    Result Date: 6/5/2023  AP chest radiograph: 6/5/2023 5:23 PM CDT History: 77 years  old Male with Sepsis. Comparison: 4/26/2023 Findings: The cardiomediastinal silhouette is enlarged. A left upper extremity PICC line catheter tip projects at the brachiocephalic/SVC region. No pneumothorax is seen. There is multilevel posterior fusion hardware at the cervicothoracic spine. No acute airspace opacities are seen. No discrete pleural effusion is apparent. Impression: No acute airspace opacities are seen. Electronically signed by:  Lisha Lopez DO  6/5/2023 5:23 PM CDT Workstation: [id number]    X-Ray Chest 1 View    Result Date: 5/29/2023  EXAMINATION: XR CHEST 1 VIEW CLINICAL HISTORY: Placement; TECHNIQUE: Single frontal view of the chest was performed. COMPARISON: None FINDINGS: PICC line courses from the left arm with the tip at the level of the proximal SVC/junction left brachiocephalic vein and SVC.  Lungs are clear of infiltrate.  No pleural effusion.  Stable cardiomediastinal silhouette.  Postoperative changes noted the lower cervical spine     No acute cardiopulmonary disease.  PICC line remains in place Electronically signed by: Bernie Lyon MD Date:    05/29/2023 Time:    16:26    X-Ray Ankle Complete Right    Result Date:  6/5/2023  Right ankle radiographs: 6/5/2023 6:48 PM CDT HISTORY:  77 years  old Male with . COMPARISON: None available TECHNIQUE: AP, lateral, mortise views of the right ankle were obtained. FINDINGS: There are no findings to suggest an acute fracture or subluxation within the right tibia. There is likely a remote fracture deformity through the distal right fibula. There is also abnormal lucency extending across the distal right fibula concerning for an acute fracture There is degenerative change seen at the mortise joint. There is an incidental calcaneal heel spur. There is a compression plate seen at the first metatarsal partially visualized. No gross radiopaque foreign body is seen. There is atherosclerotic calcification seen at the arteries. There is mild Achilles enthesopathy. IMPRESSION: There is irregularity through the distal fibula extending to the mortise joint concerning for an acute, nondisplaced fracture. There may also be a remote fracture through the distal fibula. Electronically signed by:  Lisha Lopez DO  6/5/2023 6:49 PM CDT Workstation: [id number]    CTA Chest Non-Coronary (PE Studies)    Result Date: 6/5/2023  EXAMINATION(S):  CT CHEST ANGIOGRAPHY WITH IV CONTRAST COMPARISON: Portable chest x-ray June 5, 2023. INDICATION: Pulmonary embolism (PE) suspected, high prob TECHNIQUE: CT angiography of the chest was performed with the administration of intravenous contrast media in order to evaluate the pulmonary arteries for pulmonary embolus. A 100 mL Omnipaque 350 bolus injection of water soluble contrast was administered intravenously followed by thin section cuts through the chest. This was followed by imaging post-processing with 3-D reconstruction of the pulmonary arteries in the coronal and sagittal planes with images stored in the patient's permanent medical record. All CT scans at this facility use dose modulation, iterative reconstruction and/or weight based dosing when appropriate to reduce  radiation dose to as low as reasonably achievable. FINDING(S): Pulmonary arteries: The contrast bolus is diagnostic. No pulmonary emboli are identified. No right heart strain is evident. Thoracic aorta: Thoracic aortic atherosclerosis is noted. Mild aneurysmal dilation of the ascending thoracic aorta measures 4.4 cm transverse at the main pulmonary artery level. The thoracic aorta tapers down to a normal caliber at the aortic arch level. No aortic dissection is evident. Heart: The heart is enlarged. A small pericardial effusion is noted at the anterior base. Coronary artery atherosclerotic calcification and/or stent graft artifact is present. Mediastinum and hilum: No mediastinal or hilar mass or pathologic lymph node enlargement is evident. Pulmonary: The trachea and major central airways appear patent. Small to moderate sized bilateral posterior layering pleural effusions are associated with moderate compressive atelectasis. No pulmonary edema is evident. No pneumothorax is evident. Bones: No acute bony pathology is evident. Cervical thoracic junction laminectomy and posterior fusion changes are noted along with multilevel degenerative changes. Body wall and axilla: No acute body wall pathology. A left-sided PICC line is noted. IMPRESSION: 1. No evidence of acute pulmonary emboli. 2. Mild aneurysmal dilation of the ascending thoracic aorta measures 4.4 cm. 3. A small pericardial effusion is present. 4. Bilateral posterior layering pleural effusions are associated with bibasilar compressive atelectasis. Superimposed pneumonia is not excluded. Electronically signed by:  Maximiliano Martin MD  6/5/2023 9:57 PM CDT Workstation: [id number]    US Retroperitoneal Complete    Result Date: 6/6/2023  PROCEDURE:   US RETROPERITONEUM  dated  6/5/2023 11:43 PM CDT CLINICAL HISTORY:   Male 77 years of age.   FURTHER CHARACTERIZE renal exophytic left lower pole well-defined mass TECHNIQUE:  Sonographic evaluation of the kidneys and  bladder. PREVIOUS STUDIES:  CT earlier in the evening. FINDINGS: The left renal exophytic lower pole lesion in question on the CT is a thin-walled anechoic cyst. The kidneys measure approximately 13 cm and 15 cm on the right and left. There are multiple bilateral renal cysts that do not require follow-up. Urinary bladder is empty around a Ruelas catheter. IMPRESSION: The lesion in question is a simple cyst. No further imaging is recommended. Electronically signed by:  Angie Johnson MD  6/6/2023 1:28 AM CDT Workstation: [id number]    CT Abdomen Pelvis With Contrast    Result Date: 6/5/2023  EXAMINATION: CT ABDOMEN PELVIS WITH IV CONTRAST COMPARISON: None available. INDICATION: Abdominal abscess/infection suspected; sacral decub FINDING(S): During the uneventful dynamic intravenous administration of 100 mL Omnipaque 350 water-soluble contrast, routine spiral 2.0 mm axial tomograms are obtained from the lung bases through the femoral trochanters. Supplemental coronal and sagittal reconstruction imaging is also created. All CT scans at this facility use dose modulation, iterative reconstruction and/or weight based dosing when appropriate to reduce radiation dose to as low as reasonably achievable. FINDINGS: Liver: No acute findings. Gallbladder and biliary tract: Cholelithiasis in the gallbladder is mildly dilated measuring approximately 4.4 cm transverse. No associated gallbladder wall thickening or pericholecystic fluid is seen. No bile duct dilation is evident. Pancreas: No acute findings. Spleen: No splenomegaly. Adrenal glands: No acute findings. Kidneys, ureters and urinary bladder: Multiple bilateral variable sized renal cortical cystic foci are noted. Most appear simple/benign and requires no imaging follow-up. Some are too small to characterize. There is a well-defined 4.1 cm exophytic right lower pole cystic appearing focus measuring approximately 20 Hounsfield units with thin peripheral mural calcification  present in keeping with a Bosniak 2 type cyst. An oval fairly well-defined 6.5 cm exophytic mass arising from the left lower pole measures near 90 Hounsfield units. This is likely a benign hyperdense cyst but follow-up is recommended to confirm. The urinary bladder is poorly distended with a Ruelas catheter in place and mild circumferential wall thickening which is likely artifact. Gas in the dome of the bladder is likely iatrogenic. Reproductive organs: No acute findings. Stomach and duodenum: Post bariatric surgery changes are noted. No acute gastric or duodenal pathology is evident. Small bowel: No acute findings. Appendix: No acute findings. Large bowel: The anterior left pelvic level cholostomy is benign appearing. No large bowel obstruction is evident. Lymph nodes: No pathologic lymph node enlargement is identified. Vasculature: Moderate atherosclerotic change. No aneurysm formation. An IVC filter is in satisfactory position. Peritoneal space: A solitary thin ovoid pocket of free intraperitoneal gas is noted anterior to anterior upper abdominal small bowel, axial image 87. No other pneumoperitoneum is evident. No ascites is evident. Bones/joints: A sacral decubitus ulcer is present extending down to the inferior coccyx with associated bony erosive changes in keeping with acute osteomyelitis. Bilateral hip prosthesis artifact is noted. Body wall: A multiloculated lower anterior right pelvic region subcutaneous collection is near fluid density. This overlying soft tissue stranding and skin thickening. This could represent an abscess collection or infected hematoma or seroma IMPRESSION: 1. A solitary very small pocket of intraperitoneal gas is noted anterior to upper abdominal small bowel loops. This is of uncertain etiology or significance. No other pneumoperitoneum is identified. 2. Cholelithiasis is noted with mild distention of the gallbladder. The distention may be physiologic from an n.p.o. status but  clinical correlation and follow-up is recommended to rule out acute cholecystitis. 3. Multiple bilateral renal cortical cystic foci are simple or benign-appearing. An exophytic left lower pole well-defined mass measures near 90 Hounsfield units, likely representing a hyperdense cyst. Correlation with renal ultrasound is respectfully recommended to rule out a solid mass. 4. An IVC filter is present on the study. It is recommended that all patients with IVC filters in place have an active management care plan to monitor the IVC filter. If the care plan is not in place, it is recommended this patient be referred to an interventional provider for evaluation and establishment of an IVC filter management care plan. 5. A sacral decubitus ulcer extends down to the coccyx with associated bony erosions in keeping with acute osteomyelitis. 6. A lobulated possibly cystic appearing subcutaneous fat collection in the anterior right pelvis is associated with overlying cellulitis type changes. Clinical correlation and follow-up is recommended to rule out an abscess or infected hematoma or seroma. Ultrasound guidance could be used for tissue sampling if clinically indicated. Electronically signed by:  Maximiliano Martin MD  6/5/2023 9:52 PM CDT Workstation: [id number]    CT Abdomen Pelvis  Without Contrast    Result Date: 6/6/2023  CT ABDOMEN AND PELVIS WITHOUT CONTRAST HISTORY: Follow-up, free intraperitoneal air CMS MANDATED QUALITY DATA - CT RADIATION  436 All CT scans at this facility utilize dose modulation, iterative reconstruction, and/or weight based dosing when appropriate to reduce radiation dose to as low as reasonably achievable FINDINGS: Noncontrast axial images were obtained. The lack of intravenous contrast limits assessment, most notably in regards to solid organs and vascular structures. Comparison is made to June 5. Small pleural effusions are similar to the prior study, with left greater than right dependent  atelectasis. The heart is enlarged with multifocal coronary artery atherosclerotic calcification. There is a small pericardial effusion, slightly increased. The liver has a normal noncontrast appearance. Multiple intraluminal gallstones are noted, with associated gallbladder distention, stable. The biliary tree is nondilated. The spleen is normal in size and appearance. The pancreas is unremarkable. The bilateral adrenal glands have a normal appearance. Multiple bilateral simple and complex renal cysts are unchanged. The abdominal aorta is densely calcified and tortuous, without evidence of aneurysm. There is no pathologic bowel wall thickening or evidence of obstruction. Previously noted small bubble of presumed extraluminal gas anterior to a segment of small bowel in the midabdomen persists and is unchanged. No new areas of free intraperitoneal air are identified. Postoperative changes of previous sleeve gastrectomy and possible gastric bypass are noted. No focal fluid collections are demonstrated. Left lower quadrant colostomy is demonstrated. Images of the pelvis demonstrate circumferential wall thickening of the rectum. Partially contrast-filled urinary bladder has mild circumferential wall thickening. A small amount of intraluminal gas is presumed iatrogenic with Ruelas catheter in place. Sacral decubitus ulcer with associated erosive changes of the coccyx are stable. Changes of bilateral hip arthroplasty are noted. There is extensive multilevel lumbar degenerative disc and facet disease. [place] well marginated fluid collection in the subcutaneous fat anterior and lateral to the right iliac bone is unchanged, probably a seroma. IMPRESSION: 1. Previously noted small focus of extraluminal gas in the midabdomen anteriorly near midline is unchanged. No new intraluminal gas collections are identified and there are no focal intra-abdominal fluid collections. 2. Small pericardial effusion, slightly increased compared  to June 5. 3. Multiple additional stable findings as detailed above. Electronically signed by:  Leonardo Real MD  6/6/2023 11:19 AM CDT Workstation: [id number]         Assessment/Plan:      Active Hospital Problems    Diagnosis    *Sacral osteomyelitis    Pneumoperitoneum of unknown etiology    Closed nondisplaced fracture of lateral malleolus of fibula    Bedbound    Decubitus ulcer, multiple chronic    Anticoagulated on Coumadin    Anemia    HFrEF (heart failure with reduced ejection fraction)    Hematuria    Scrotal injury    Severe protein-calorie malnutrition    Pressure injury of sacral region, stage 4    Complex renal cyst    MCI (mild cognitive impairment)    Paroxysmal atrial fibrillation     EKG stable  Stable on Coreg, no missed doses of anticoagulation. Continue anticoagulation as described below      LV dysfunction, EF 40%     Last Echo 5/2020  Repeat echo stable  Continue lisinopril and Coreg as now  Monitor      CKD (chronic kidney disease) stage 3, GFR 30-59 ml/min, 41    Aortic aneurysm, thoracic, 4.3 cm, 8/2010     Measurement of aortic root appears stable- has not had CTA chest to monitor.  Will discuss with Dr. Soto and will recommend CT chest to follow      Diabetes mellitus with chronic kidney disease, stage III    MTHFR mutation (methylenetetrahydrofolate reductase)    Sleep apnea, using BiPAP nightly, 1999, last sleep test in 2013    Hypertension associated with diabetes    CAD (coronary artery disease)    GERD (gastroesophageal reflux disease)     Plan:  Continue care on medical floor  Contact isolation precautions  Currently on meropenem and vancomycin, await Infectious Disease recommendations about antibiotics moving forward   Appreciate wound care evaluations, daily wound care as per recommendation, Q 2 turning, optimizing nutrition status  Hold Coumadin and trending INR   Check anemia labs including B12, folic acid, iron studies  Edema noted most prominent  left upper extremity, Lasix 20 mg b.i.d. and following  Monitoring urine output and hematuria   Previous echo with EF of 45%   Fall and delirium precautions  A.m. labs ordered  Appreciate all consultant's input   PT/OT evaluation   Further plan as per hospital course   Patient to return to LTAC once medically stable    VTE Risk Mitigation (From admission, onward)         Ordered     IP VTE HIGH RISK PATIENT  Once         06/05/23 2315     Place sequential compression device  Until discontinued         06/05/23 2315     Reason for No Pharmacological VTE Prophylaxis  Once        Question:  Reasons:  Answer:  Already adequately anticoagulated on oral Anticoagulants    06/05/23 2315                Discharge Planning   ORACIO: 6/9/2023     Code Status: Full Code   Is the patient medically ready for discharge?:     Reason for patient still in hospital (select all that apply): Consult recommendations  Discharge Plan A: Long-term acute care facility (LTAC)                  Deidra Valle MD  Department of Hospital Medicine   Former[facility]

## 2023-06-06 NOTE — CARE UPDATE
06/06/23 1045   Patient Assessment/Suction   Level of Consciousness (AVPU) alert   Respiratory Effort Unlabored   Expansion/Accessory Muscles/Retractions no use of accessory muscles   All Lung Fields Breath Sounds clear;diminished   Rhythm/Pattern, Respiratory unlabored   Cough Frequency no cough   Skin Integrity   $ Wound Care Tech Time 15 min   Area Observed Bridge of nose;Left;Right;Cheek   Skin Appearance other (see comments)  (BRUISES ALL AROUND FACE FROM FALL IN MARCH)   Barrier used? Gel Cushion   PRE-TX-O2   Device (Oxygen Therapy) room air  (WEANED FROM 2L NC)   SpO2 97 %   Pulse Oximetry Type Intermittent   $ Pulse Oximetry - Single Charge Pulse Oximetry - Single   Pulse 67   Preset CPAP/BiPAP Settings   Mode Of Delivery BiPAP S/T;Standby   $ CPAP/BiPAP Daily Charge BiPAP/CPAP Daily   $ Initial CPAP/BiPAP Setup? No   Education   $ Education Other (see comment);15 min  (SATS)   Respiratory Evaluation   $ Care Plan Tech Time 15 min   $ Eval/Re-eval Charges Re-evaluation

## 2023-06-06 NOTE — HPI
77-year-old white male with multiple medical problems.  He is bed-ridden/nonambulatory.  We were consulted secondary to a right distal fibula fracture.

## 2023-06-06 NOTE — PROGRESS NOTES
Received consultation this morning for fibula fracture.  Consultation was placed yesterday, June 5, 2023 at 2306 hours.  We were not on-call at that time.  Dr. Rose was.  Please forward the consultation to his office.  Thank you.

## 2023-06-06 NOTE — CONSULTS
[facility]  Orthopedics  Progress Note    Patient Name: Richard Bustos  MRN: [id number]  Admission Date: 6/5/2023  Hospital Length of Stay: 1 days  Attending Provider: Deidra Valle MD  Primary Care Provider: Familia Ramirez Jr, MD    Subjective:     Principal Problem:Osteomyelitis    Principal Orthopedic Problem:  Nondisplaced right distal fibula fracture.    Interval History:  Unremarkable    Review of patient's allergies indicates:   Allergen Reactions    Donepezil Other (See Comments)     AMS    Bactroban [mupirocin calcium] Blisters     Causes Blisters    Shellfish containing products Other (See Comments)     Other reaction(s): Gout  OYSTERS       Current Facility-Administered Medications   Medication    acetaminophen tablet 650 mg    albuterol-ipratropium 2.5 mg-0.5 mg/3 mL nebulizer solution 3 mL    allopurinoL tablet 100 mg    amiodarone tablet 200 mg    atorvastatin tablet 80 mg    dextrose 50% injection 12.5 g    dextrose 50% injection 25 g    glucagon (human recombinant) injection 1 mg    glucose chewable tablet 16 g    glucose chewable tablet 24 g    guaiFENesin 12 hr tablet 600 mg    HYDROcodone-acetaminophen 5-325 mg per tablet 1 tablet    insulin aspart U-100 pen 1-10 Units    melatonin tablet 6 mg    meropenem 1 g in sodium chloride 0.9 % 100 mL IVPB (ready to mix system)    morphine injection 4 mg    naloxone 0.4 mg/mL injection 0.02 mg    ondansetron injection 4 mg    pantoprazole EC tablet 40 mg    polyethylene glycol packet 17 g    senna-docusate 8.6-50 mg per tablet 1 tablet    simethicone chewable tablet 80 mg    sodium chloride 0.9% flush 10 mL    traZODone tablet 50 mg    vancomycin (VANCOCIN) 1,750 mg in dextrose 5 % (D5W) 500 mL IVPB    vancomycin - pharmacy to dose    white petrolatum 41 % ointment     Facility-Administered Medications Ordered in Other Encounters   Medication    [MAR Hold - Suspended Admission] allopurinoL tablet 100 mg     [MAR Hold - Suspended Admission] aluminum-magnesium hydroxide-simethicone 200-200-20 mg/5 mL suspension 30 mL    [MAR Hold - Suspended Admission] amiodarone tablet 200 mg    [MAR Hold - Suspended Admission] ascorbic acid (vitamin C) tablet 500 mg    [MAR Hold - Suspended Admission] aspirin EC tablet 81 mg    [MAR Hold - Suspended Admission] atorvastatin tablet 80 mg    [MAR Hold - Suspended Admission] bisacodyL suppository 10 mg    [MAR Hold - Suspended Admission] collagenase ointment    [MAR Hold - Suspended Admission] collagenase ointment    [MAR Hold - Suspended Admission] famotidine tablet 40 mg    [MAR Hold - Suspended Admission] furosemide tablet 20 mg    [MAR Hold - Suspended Admission] glucagon (human recombinant) injection 1 mg    [MAR Hold - Suspended Admission] glucose chewable tablet 16 g    [MAR Hold - Suspended Admission] glucose chewable tablet 24 g    [MAR Hold - Suspended Admission] guaiFENesin 12 hr tablet 600 mg    [MAR Hold - Suspended Admission] HYDROcodone-acetaminophen 5-325 mg per tablet 1 tablet    [MAR Hold - Suspended Admission] insulin aspart U-100 pen 1-10 Units    [MAR Hold - Suspended Admission] LIDOcaine HCL 4% external solution 4 mL    [MAR Hold - Suspended Admission] melatonin tablet 6 mg    [MAR Hold - Suspended Admission] multivitamin tablet    [MAR Hold - Suspended Admission] naloxone 0.4 mg/mL injection 0.02 mg    [MAR Hold - Suspended Admission] ondansetron disintegrating tablet 8 mg    [MAR Hold - Suspended Admission] potassium chloride CR tablet 10 mEq    [MAR Hold - Suspended Admission] simethicone chewable tablet 80 mg    [MAR Hold - Suspended Admission] sodium chloride 0.9% flush 10 mL    [MAR Hold - Suspended Admission] sodium chloride 0.9% flush 10 mL    And    [MAR Hold - Suspended Admission] sodium chloride 0.9% flush 10 mL    [MAR Hold - Suspended Admission] traZODone tablet 50 mg    [MAR Hold - Suspended Admission] vitamin D 1000 units  "tablet 1,000 Units    [MAR Hold - Suspended Admission] zinc sulfate capsule 220 mg     Objective:     Vital Signs (Most Recent):  Temp: 97.3 °F (36.3 °C) (06/06/23 1146)  Pulse: 67 (06/06/23 1045)  Resp: 18 (06/06/23 1146)  BP: (!) 152/84 (06/06/23 1146)  SpO2: (!) 92 % (06/06/23 1146) Vital Signs (24h Range):  Temp:  [97.3 °F (36.3 °C)-98.8 °F (37.1 °C)] 97.3 °F (36.3 °C)  Pulse:  [64-83] 67  Resp:  [15-29] 18  SpO2:  [90 %-100 %] 92 %  BP: ()/(56-84) 152/84     Weight: 102.7 kg (226 lb 6.6 oz)  Height: 5' 8" (172.7 cm)  Body mass index is 34.43 kg/m².      Intake/Output Summary (Last 24 hours) at 6/6/2023 1534  Last data filed at 6/6/2023 1235  Gross per 24 hour   Intake 1028.65 ml   Output 1000 ml   Net 28.65 ml        General    Constitutional: He is oriented to person, place, and time.   Pulmonary/Chest: Effort normal.   Neurological: He is alert and oriented to person, place, and time.   Psychiatric: He has a normal mood and affect. His behavior is normal. Judgment and thought content normal.         Right Ankle/Foot Exam     Inspection   Scars: present    Range of Motion   Ankle Joint   Dorsiflexion:  abnormal   Plantar flexion:  abnormal   First MTP Joint: limited    Tests   Heel Walk: unable to perform  Tiptoe Walk: unable to perform  Single Heel Rise: unable to perform    Other   Sensation: decreased    Comments:  77-year-old male with right distal fibula fracture, nondisplaced.  He does have a history of previous spinal injury where he has decreased sensation and motor function to his bilateral lower extremities.  He is nonambulatory.  He is bed ridden.  He has multiple decubiti on his scrotum, sacrum, and bilateral feet/heels.  He was found to have a nondisplaced right distal fibula fracture on radiographs.  He has decreased sensation to light touch to all digits in the right foot.  He can wiggle the toes to his right foot.  Capillary refill is brisk and less than 2 seconds to all digits in the " right foot.  He has some mild tenderness to palpation about the distal ankle on the right laterally.        Muscle Strength   Left Lower Extremity   EHL:  4/5     Significant Labs: All pertinent labs within the past 24 hours have been reviewed.    Significant Imaging: X-Ray: I have reviewed all pertinent results/findings and my personal findings are:  Nondisplaced right distal fibula fracture.    Assessment/Plan:     Closed nondisplaced fracture of lateral malleolus of fibula  1. Right distal fibula fracture, nondisplaced.    Patient is nonambulatory.  His fracture is nondisplaced.  Would normally treat this in a cam walker boot or some type of splint.  However, considering his nonambulatory status and his multiple wounds/decubiti to this lower extremity, we will leave open.  He is in a soft dressing.  This is more than sufficient to protect.  Should heal on its own without any further intervention over the next 6-8 weeks.  No surgery is planned for this current time.  He can follow up with us in the clinic as an outpatient for repeat radiographs to monitor healing.  Thank you for the consultation.        Hollis Emanuel PA-C  Orthopedics  [facility]

## 2023-06-06 NOTE — SUBJECTIVE & OBJECTIVE
Principal Problem:Osteomyelitis    Principal Orthopedic Problem:  Nondisplaced right distal fibula fracture.    Interval History:  Unremarkable    Review of patient's allergies indicates:   Allergen Reactions    Donepezil Other (See Comments)     AMS    Bactroban [mupirocin calcium] Blisters     Causes Blisters    Shellfish containing products Other (See Comments)     Other reaction(s): Gout  OYSTERS       Current Facility-Administered Medications   Medication    acetaminophen tablet 650 mg    albuterol-ipratropium 2.5 mg-0.5 mg/3 mL nebulizer solution 3 mL    allopurinoL tablet 100 mg    amiodarone tablet 200 mg    atorvastatin tablet 80 mg    dextrose 50% injection 12.5 g    dextrose 50% injection 25 g    glucagon (human recombinant) injection 1 mg    glucose chewable tablet 16 g    glucose chewable tablet 24 g    guaiFENesin 12 hr tablet 600 mg    HYDROcodone-acetaminophen 5-325 mg per tablet 1 tablet    insulin aspart U-100 pen 1-10 Units    melatonin tablet 6 mg    meropenem 1 g in sodium chloride 0.9 % 100 mL IVPB (ready to mix system)    morphine injection 4 mg    naloxone 0.4 mg/mL injection 0.02 mg    ondansetron injection 4 mg    pantoprazole EC tablet 40 mg    polyethylene glycol packet 17 g    senna-docusate 8.6-50 mg per tablet 1 tablet    simethicone chewable tablet 80 mg    sodium chloride 0.9% flush 10 mL    traZODone tablet 50 mg    vancomycin (VANCOCIN) 1,750 mg in dextrose 5 % (D5W) 500 mL IVPB    vancomycin - pharmacy to dose    white petrolatum 41 % ointment     Facility-Administered Medications Ordered in Other Encounters   Medication    [MAR Hold - Suspended Admission] allopurinoL tablet 100 mg    [MAR Hold - Suspended Admission] aluminum-magnesium hydroxide-simethicone 200-200-20 mg/5 mL suspension 30 mL    [MAR Hold - Suspended Admission] amiodarone tablet 200 mg    [MAR Hold - Suspended Admission] ascorbic acid (vitamin C) tablet 500 mg    [MAR Hold - Suspended Admission] aspirin EC tablet  81 mg    [MAR Hold - Suspended Admission] atorvastatin tablet 80 mg    [MAR Hold - Suspended Admission] bisacodyL suppository 10 mg    [MAR Hold - Suspended Admission] collagenase ointment    [MAR Hold - Suspended Admission] collagenase ointment    [MAR Hold - Suspended Admission] famotidine tablet 40 mg    [MAR Hold - Suspended Admission] furosemide tablet 20 mg    [MAR Hold - Suspended Admission] glucagon (human recombinant) injection 1 mg    [MAR Hold - Suspended Admission] glucose chewable tablet 16 g    [MAR Hold - Suspended Admission] glucose chewable tablet 24 g    [MAR Hold - Suspended Admission] guaiFENesin 12 hr tablet 600 mg    [MAR Hold - Suspended Admission] HYDROcodone-acetaminophen 5-325 mg per tablet 1 tablet    [MAR Hold - Suspended Admission] insulin aspart U-100 pen 1-10 Units    [MAR Hold - Suspended Admission] LIDOcaine HCL 4% external solution 4 mL    [MAR Hold - Suspended Admission] melatonin tablet 6 mg    [MAR Hold - Suspended Admission] multivitamin tablet    [MAR Hold - Suspended Admission] naloxone 0.4 mg/mL injection 0.02 mg    [MAR Hold - Suspended Admission] ondansetron disintegrating tablet 8 mg    [MAR Hold - Suspended Admission] potassium chloride CR tablet 10 mEq    [MAR Hold - Suspended Admission] simethicone chewable tablet 80 mg    [MAR Hold - Suspended Admission] sodium chloride 0.9% flush 10 mL    [MAR Hold - Suspended Admission] sodium chloride 0.9% flush 10 mL    And    [MAR Hold - Suspended Admission] sodium chloride 0.9% flush 10 mL    [MAR Hold - Suspended Admission] traZODone tablet 50 mg    [MAR Hold - Suspended Admission] vitamin D 1000 units tablet 1,000 Units    [MAR Hold - Suspended Admission] zinc sulfate capsule 220 mg     Objective:     Vital Signs (Most Recent):  Temp: 97.3 °F (36.3 °C) (06/06/23 1146)  Pulse: 67 (06/06/23 1045)  Resp: 18 (06/06/23 1146)  BP: (!) 152/84 (06/06/23 1146)  SpO2: (!) 92 % (06/06/23 1146) Vital Signs (24h Range):  Temp:  [97.3 °F  "(36.3 °C)-98.8 °F (37.1 °C)] 97.3 °F (36.3 °C)  Pulse:  [64-83] 67  Resp:  [15-29] 18  SpO2:  [90 %-100 %] 92 %  BP: ()/(56-84) 152/84     Weight: 102.7 kg (226 lb 6.6 oz)  Height: 5' 8" (172.7 cm)  Body mass index is 34.43 kg/m².      Intake/Output Summary (Last 24 hours) at 6/6/2023 1534  Last data filed at 6/6/2023 1235  Gross per 24 hour   Intake 1028.65 ml   Output 1000 ml   Net 28.65 ml        General    Constitutional: He is oriented to person, place, and time.   Pulmonary/Chest: Effort normal.   Neurological: He is alert and oriented to person, place, and time.   Psychiatric: He has a normal mood and affect. His behavior is normal. Judgment and thought content normal.         Right Ankle/Foot Exam     Inspection   Scars: present    Range of Motion   Ankle Joint   Dorsiflexion:  abnormal   Plantar flexion:  abnormal   First MTP Joint: limited    Tests   Heel Walk: unable to perform  Tiptoe Walk: unable to perform  Single Heel Rise: unable to perform    Other   Sensation: decreased    Comments:  77-year-old male with right distal fibula fracture, nondisplaced.  He does have a history of previous spinal injury where he has decreased sensation and motor function to his bilateral lower extremities.  He is nonambulatory.  He is bed ridden.  He has multiple decubiti on his scrotum, sacrum, and bilateral feet/heels.  He was found to have a nondisplaced right distal fibula fracture on radiographs.  He has decreased sensation to light touch to all digits in the right foot.  He can wiggle the toes to his right foot.  Capillary refill is brisk and less than 2 seconds to all digits in the right foot.  He has some mild tenderness to palpation about the distal ankle on the right laterally.        Muscle Strength   Left Lower Extremity   EHL:  4/5     Significant Labs: All pertinent labs within the past 24 hours have been reviewed.    Significant Imaging: X-Ray: I have reviewed all pertinent results/findings and my " personal findings are:  Nondisplaced right distal fibula fracture.

## 2023-06-06 NOTE — SUBJECTIVE & OBJECTIVE
Interval History:  See hospital course.  Patient is bed-bound with multiple decubitus including sacral osteomyelitis with diverting ostomy.  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  He denies any shortness of breath.  States he has hematuria.  He is very concerned about bone exposure in his ankle.  Afebrile with T-max 98.8°.  Labs with WBC 9, hemoglobin 7.1, BUN/creatinine 37/1.4, albumin 2.8.  Previous echo with EF of 45%.  Imaging studies reviewed.  No visitors at bedside.    Review of Systems   HENT:  Negative for trouble swallowing.    Respiratory:  Negative for shortness of breath.    Genitourinary:  Positive for hematuria.   Skin:  Positive for wound.   Neurological:  Positive for weakness.   Objective:     Vital Signs (Most Recent):  Temp: 97.3 °F (36.3 °C) (06/06/23 1146)  Pulse: 67 (06/06/23 1045)  Resp: 18 (06/06/23 1146)  BP: (!) 152/84 (06/06/23 1146)  SpO2: (!) 92 % (06/06/23 1146) Vital Signs (24h Range):  Temp:  [97.3 °F (36.3 °C)-98.8 °F (37.1 °C)] 97.3 °F (36.3 °C)  Pulse:  [64-83] 67  Resp:  [15-29] 18  SpO2:  [90 %-100 %] 92 %  BP: ()/(56-84) 152/84     Weight: 102.7 kg (226 lb 6.6 oz)  Body mass index is 34.43 kg/m².    Intake/Output Summary (Last 24 hours) at 6/6/2023 1550  Last data filed at 6/6/2023 1235  Gross per 24 hour   Intake 1028.65 ml   Output 1000 ml   Net 28.65 ml         Physical Exam  Vitals and nursing note reviewed.   Constitutional:       Comments: Chronically ill-appearing, lying in bed   HENT:      Head: Normocephalic and atraumatic.      Mouth/Throat:      Mouth: Mucous membranes are moist.   Cardiovascular:      Rate and Rhythm: Normal rate and regular rhythm.   Pulmonary:      Comments: On nasal cannula  Abdominal:      Comments: Ostomy in place left lower quadrant   Genitourinary:     Comments: Ruelas in place with reddish urine draining  Skin:     Comments: Left upper extremity PICC   Neurological:      Mental Status: He is alert and oriented  to person, place, and time.   Psychiatric:         Mood and Affect: Mood normal.                                 Significant Labs: BMP:   Recent Labs   Lab 06/06/23  0449   GLU 81   *   K 3.7      CO2 28   BUN 37*   CREATININE 1.4   CALCIUM 8.6*   MG 1.7     CBC:   Recent Labs   Lab 06/05/23  1643 06/06/23  0449   WBC 14.71* 9.08   HGB 8.7* 7.1*   HCT 26.2* 21.0*   * 334     CMP:   Recent Labs   Lab 06/05/23  1643 06/06/23  0449   * 135*   K 4.5 3.7    103   CO2 27 28   * 81   BUN 40* 37*   CREATININE 1.4 1.4   CALCIUM 9.2 8.6*   PROT 6.5 5.0*   ALBUMIN 2.4* 1.9*   BILITOT 0.4 0.5   ALKPHOS 292* 218*   AST 55* 34   ALT 51* 36   ANIONGAP 7* 4*     Cardiac Markers: No results for input(s): CKMB, MYOGLOBIN, BNP, TROPISTAT in the last 48 hours.  Lactic Acid:   Recent Labs   Lab 06/05/23  1649   LACTATE 0.9     Magnesium:   Recent Labs   Lab 06/05/23  1643 06/06/23  0449   MG 1.8 1.7       Significant Imaging: I have reviewed all pertinent imaging results/findings within the past 24 hours.    X-Ray Chest 1 View    Result Date: 6/5/2023  AP chest radiograph: 6/5/2023 5:23 PM CDT History: 77 years  old Male with Sepsis. Comparison: 4/26/2023 Findings: The cardiomediastinal silhouette is enlarged. A left upper extremity PICC line catheter tip projects at the brachiocephalic/SVC region. No pneumothorax is seen. There is multilevel posterior fusion hardware at the cervicothoracic spine. No acute airspace opacities are seen. No discrete pleural effusion is apparent. Impression: No acute airspace opacities are seen. Electronically signed by:  Lisha Lopez DO  6/5/2023 5:23 PM CDT Workstation: [id number]    X-Ray Chest 1 View    Result Date: 5/29/2023  EXAMINATION: XR CHEST 1 VIEW CLINICAL HISTORY: Placement; TECHNIQUE: Single frontal view of the chest was performed. COMPARISON: None FINDINGS: PICC line courses from the left arm with the tip at the level of the proximal SVC/junction left  brachiocephalic vein and SVC.  Lungs are clear of infiltrate.  No pleural effusion.  Stable cardiomediastinal silhouette.  Postoperative changes noted the lower cervical spine     No acute cardiopulmonary disease.  PICC line remains in place Electronically signed by: Bernie Lyon MD Date:    05/29/2023 Time:    16:26    X-Ray Ankle Complete Right    Result Date: 6/5/2023  Right ankle radiographs: 6/5/2023 6:48 PM CDT HISTORY:  77 years  old Male with . COMPARISON: None available TECHNIQUE: AP, lateral, mortise views of the right ankle were obtained. FINDINGS: There are no findings to suggest an acute fracture or subluxation within the right tibia. There is likely a remote fracture deformity through the distal right fibula. There is also abnormal lucency extending across the distal right fibula concerning for an acute fracture There is degenerative change seen at the mortise joint. There is an incidental calcaneal heel spur. There is a compression plate seen at the first metatarsal partially visualized. No gross radiopaque foreign body is seen. There is atherosclerotic calcification seen at the arteries. There is mild Achilles enthesopathy. IMPRESSION: There is irregularity through the distal fibula extending to the mortise joint concerning for an acute, nondisplaced fracture. There may also be a remote fracture through the distal fibula. Electronically signed by:  Lisha Lopez DO  6/5/2023 6:49 PM CDT Workstation: [id number]    CTA Chest Non-Coronary (PE Studies)    Result Date: 6/5/2023  EXAMINATION(S):  CT CHEST ANGIOGRAPHY WITH IV CONTRAST COMPARISON: Portable chest x-ray June 5, 2023. INDICATION: Pulmonary embolism (PE) suspected, high prob TECHNIQUE: CT angiography of the chest was performed with the administration of intravenous contrast media in order to evaluate the pulmonary arteries for pulmonary embolus. A 100 mL Omnipaque 350 bolus injection of water soluble contrast was administered intravenously  followed by thin section cuts through the chest. This was followed by imaging post-processing with 3-D reconstruction of the pulmonary arteries in the coronal and sagittal planes with images stored in the patient's permanent medical record. All CT scans at this facility use dose modulation, iterative reconstruction and/or weight based dosing when appropriate to reduce radiation dose to as low as reasonably achievable. FINDING(S): Pulmonary arteries: The contrast bolus is diagnostic. No pulmonary emboli are identified. No right heart strain is evident. Thoracic aorta: Thoracic aortic atherosclerosis is noted. Mild aneurysmal dilation of the ascending thoracic aorta measures 4.4 cm transverse at the main pulmonary artery level. The thoracic aorta tapers down to a normal caliber at the aortic arch level. No aortic dissection is evident. Heart: The heart is enlarged. A small pericardial effusion is noted at the anterior base. Coronary artery atherosclerotic calcification and/or stent graft artifact is present. Mediastinum and hilum: No mediastinal or hilar mass or pathologic lymph node enlargement is evident. Pulmonary: The trachea and major central airways appear patent. Small to moderate sized bilateral posterior layering pleural effusions are associated with moderate compressive atelectasis. No pulmonary edema is evident. No pneumothorax is evident. Bones: No acute bony pathology is evident. Cervical thoracic junction laminectomy and posterior fusion changes are noted along with multilevel degenerative changes. Body wall and axilla: No acute body wall pathology. A left-sided PICC line is noted. IMPRESSION: 1. No evidence of acute pulmonary emboli. 2. Mild aneurysmal dilation of the ascending thoracic aorta measures 4.4 cm. 3. A small pericardial effusion is present. 4. Bilateral posterior layering pleural effusions are associated with bibasilar compressive atelectasis. Superimposed pneumonia is not excluded.  Electronically signed by:  Maximiliano Martin MD  6/5/2023 9:57 PM CDT Workstation: [id number]    US Retroperitoneal Complete    Result Date: 6/6/2023  PROCEDURE:   US RETROPERITONEUM  dated  6/5/2023 11:43 PM CDT CLINICAL HISTORY:   Male 77 years of age.   FURTHER CHARACTERIZE renal exophytic left lower pole well-defined mass TECHNIQUE:  Sonographic evaluation of the kidneys and bladder. PREVIOUS STUDIES:  CT earlier in the evening. FINDINGS: The left renal exophytic lower pole lesion in question on the CT is a thin-walled anechoic cyst. The kidneys measure approximately 13 cm and 15 cm on the right and left. There are multiple bilateral renal cysts that do not require follow-up. Urinary bladder is empty around a Ruelas catheter. IMPRESSION: The lesion in question is a simple cyst. No further imaging is recommended. Electronically signed by:  Angie Johnson MD  6/6/2023 1:28 AM CDT Workstation: [id number]    CT Abdomen Pelvis With Contrast    Result Date: 6/5/2023  EXAMINATION: CT ABDOMEN PELVIS WITH IV CONTRAST COMPARISON: None available. INDICATION: Abdominal abscess/infection suspected; sacral decub FINDING(S): During the uneventful dynamic intravenous administration of 100 mL Omnipaque 350 water-soluble contrast, routine spiral 2.0 mm axial tomograms are obtained from the lung bases through the femoral trochanters. Supplemental coronal and sagittal reconstruction imaging is also created. All CT scans at this facility use dose modulation, iterative reconstruction and/or weight based dosing when appropriate to reduce radiation dose to as low as reasonably achievable. FINDINGS: Liver: No acute findings. Gallbladder and biliary tract: Cholelithiasis in the gallbladder is mildly dilated measuring approximately 4.4 cm transverse. No associated gallbladder wall thickening or pericholecystic fluid is seen. No bile duct dilation is evident. Pancreas: No acute findings. Spleen: No splenomegaly. Adrenal glands: No acute  findings. Kidneys, ureters and urinary bladder: Multiple bilateral variable sized renal cortical cystic foci are noted. Most appear simple/benign and requires no imaging follow-up. Some are too small to characterize. There is a well-defined 4.1 cm exophytic right lower pole cystic appearing focus measuring approximately 20 Hounsfield units with thin peripheral mural calcification present in keeping with a Bosniak 2 type cyst. An oval fairly well-defined 6.5 cm exophytic mass arising from the left lower pole measures near 90 Hounsfield units. This is likely a benign hyperdense cyst but follow-up is recommended to confirm. The urinary bladder is poorly distended with a Ruelas catheter in place and mild circumferential wall thickening which is likely artifact. Gas in the dome of the bladder is likely iatrogenic. Reproductive organs: No acute findings. Stomach and duodenum: Post bariatric surgery changes are noted. No acute gastric or duodenal pathology is evident. Small bowel: No acute findings. Appendix: No acute findings. Large bowel: The anterior left pelvic level cholostomy is benign appearing. No large bowel obstruction is evident. Lymph nodes: No pathologic lymph node enlargement is identified. Vasculature: Moderate atherosclerotic change. No aneurysm formation. An IVC filter is in satisfactory position. Peritoneal space: A solitary thin ovoid pocket of free intraperitoneal gas is noted anterior to anterior upper abdominal small bowel, axial image 87. No other pneumoperitoneum is evident. No ascites is evident. Bones/joints: A sacral decubitus ulcer is present extending down to the inferior coccyx with associated bony erosive changes in keeping with acute osteomyelitis. Bilateral hip prosthesis artifact is noted. Body wall: A multiloculated lower anterior right pelvic region subcutaneous collection is near fluid density. This overlying soft tissue stranding and skin thickening. This could represent an abscess  collection or infected hematoma or seroma IMPRESSION: 1. A solitary very small pocket of intraperitoneal gas is noted anterior to upper abdominal small bowel loops. This is of uncertain etiology or significance. No other pneumoperitoneum is identified. 2. Cholelithiasis is noted with mild distention of the gallbladder. The distention may be physiologic from an n.p.o. status but clinical correlation and follow-up is recommended to rule out acute cholecystitis. 3. Multiple bilateral renal cortical cystic foci are simple or benign-appearing. An exophytic left lower pole well-defined mass measures near 90 Hounsfield units, likely representing a hyperdense cyst. Correlation with renal ultrasound is respectfully recommended to rule out a solid mass. 4. An IVC filter is present on the study. It is recommended that all patients with IVC filters in place have an active management care plan to monitor the IVC filter. If the care plan is not in place, it is recommended this patient be referred to an interventional provider for evaluation and establishment of an IVC filter management care plan. 5. A sacral decubitus ulcer extends down to the coccyx with associated bony erosions in keeping with acute osteomyelitis. 6. A lobulated possibly cystic appearing subcutaneous fat collection in the anterior right pelvis is associated with overlying cellulitis type changes. Clinical correlation and follow-up is recommended to rule out an abscess or infected hematoma or seroma. Ultrasound guidance could be used for tissue sampling if clinically indicated. Electronically signed by:  Maximiliano Martin MD  6/5/2023 9:52 PM CDT Workstation: [id number]    CT Abdomen Pelvis  Without Contrast    Result Date: 6/6/2023  CT ABDOMEN AND PELVIS WITHOUT CONTRAST HISTORY: Follow-up, free intraperitoneal air CMS MANDATED QUALITY DATA - CT RADIATION  436 All CT scans at this facility utilize dose modulation, iterative reconstruction, and/or weight based  dosing when appropriate to reduce radiation dose to as low as reasonably achievable FINDINGS: Noncontrast axial images were obtained. The lack of intravenous contrast limits assessment, most notably in regards to solid organs and vascular structures. Comparison is made to June 5. Small pleural effusions are similar to the prior study, with left greater than right dependent atelectasis. The heart is enlarged with multifocal coronary artery atherosclerotic calcification. There is a small pericardial effusion, slightly increased. The liver has a normal noncontrast appearance. Multiple intraluminal gallstones are noted, with associated gallbladder distention, stable. The biliary tree is nondilated. The spleen is normal in size and appearance. The pancreas is unremarkable. The bilateral adrenal glands have a normal appearance. Multiple bilateral simple and complex renal cysts are unchanged. The abdominal aorta is densely calcified and tortuous, without evidence of aneurysm. There is no pathologic bowel wall thickening or evidence of obstruction. Previously noted small bubble of presumed extraluminal gas anterior to a segment of small bowel in the midabdomen persists and is unchanged. No new areas of free intraperitoneal air are identified. Postoperative changes of previous sleeve gastrectomy and possible gastric bypass are noted. No focal fluid collections are demonstrated. Left lower quadrant colostomy is demonstrated. Images of the pelvis demonstrate circumferential wall thickening of the rectum. Partially contrast-filled urinary bladder has mild circumferential wall thickening. A small amount of intraluminal gas is presumed iatrogenic with Ruelas catheter in place. Sacral decubitus ulcer with associated erosive changes of the coccyx are stable. Changes of bilateral hip arthroplasty are noted. There is extensive multilevel lumbar degenerative disc and facet disease. Outing well marginated fluid collection in the  subcutaneous fat anterior and lateral to the right iliac bone is unchanged, probably a seroma. IMPRESSION: 1. Previously noted small focus of extraluminal gas in the midabdomen anteriorly near midline is unchanged. No new intraluminal gas collections are identified and there are no focal intra-abdominal fluid collections. 2. Small pericardial effusion, slightly increased compared to June 5. 3. Multiple additional stable findings as detailed above. Electronically signed by:  Leonardo Real MD  6/6/2023 11:19 AM CDT Workstation: [id number]

## 2023-06-06 NOTE — CONSULTS
3x1.8x0.2cm stage 2 foreskin pink wound bed.  Cleaned and dried and applied with Triad.          1.1x1x0.2cm right great toe red wound bed stage 2, distal great toe peeling, dry scab dorsum medial foot, pink abrasion dorsum few dry scabs scattered right leg     Right heel 50% tan slough, 50% pink wound bed.  Stage 3, periwound redness.  1.4x2x0.2cm     1.3x1cm purple DTI left heel non open     Posterior Scrotum stage 3 ulcer 10.2x5.1x0.3cm 90% red beefy tissue, bleeds easily 10% tan slough.                  Stage 4 sacral ulcer 15.5x10.2x5.4cm beefy red [id number]%,  30% yellow slough bone palpated with gray area at the 7 oclock position wound edge     Left heel  Left arm 0.3cm round skin tear weeping left arm swelling.   Left lateral foot purple DTI 0.8x1cm  Right hand scab cleaned and dried, posterior left thigh abrasion pink   Bilateral legs dry scaley pink scars. Waffle mattress, heel protectors elevated with pillows

## 2023-06-07 PROBLEM — D50.9 IDA (IRON DEFICIENCY ANEMIA): Chronic | Status: ACTIVE | Noted: 2023-06-07

## 2023-06-07 LAB
ABO + RH BLD: NORMAL
ACINETOBACTER CALCOACETICUS/BAUMANNII COMPLEX: NOT DETECTED
ALBUMIN SERPL BCP-MCNC: 2 G/DL (ref 3.5–5.2)
ALP SERPL-CCNC: 204 U/L (ref 55–135)
ALT SERPL W/O P-5'-P-CCNC: 35 U/L (ref 10–44)
ANION GAP SERPL CALC-SCNC: 4 MMOL/L (ref 8–16)
AST SERPL-CCNC: 35 U/L (ref 10–40)
BACTEROIDES FRAGILIS: NOT DETECTED
BASOPHILS # BLD AUTO: 0.04 K/UL (ref 0–0.2)
BASOPHILS NFR BLD: 0.4 % (ref 0–1.9)
BILIRUB SERPL-MCNC: 0.7 MG/DL (ref 0.1–1)
BLD GP AB SCN CELLS X3 SERPL QL: NORMAL
BUN SERPL-MCNC: 33 MG/DL (ref 8–23)
CALCIUM SERPL-MCNC: 8.7 MG/DL (ref 8.7–10.5)
CANDIDA ALBICANS: NOT DETECTED
CANDIDA AURIS: NOT DETECTED
CANDIDA GLABRATA: DETECTED
CANDIDA KRUSEI: NOT DETECTED
CANDIDA PARAPSILOSIS: NOT DETECTED
CANDIDA TROPICALIS: DETECTED
CHLORIDE SERPL-SCNC: 106 MMOL/L (ref 95–110)
CO2 SERPL-SCNC: 27 MMOL/L (ref 23–29)
CREAT SERPL-MCNC: 1.2 MG/DL (ref 0.5–1.4)
CRYPTOCOCCUS NEOFORMANS/GATTII: NOT DETECTED
CTX-M GENE: ABNORMAL
DIFFERENTIAL METHOD: ABNORMAL
ENTEROBACTER CLOACAE COMPLEX: NOT DETECTED
ENTEROBACTERALES: NOT DETECTED
ENTEROCOCCUS FAECALIS: NOT DETECTED
ENTEROCOCCUS FAECIUM: NOT DETECTED
EOSINOPHIL # BLD AUTO: 0.2 K/UL (ref 0–0.5)
EOSINOPHIL NFR BLD: 2 % (ref 0–8)
ERYTHROCYTE [DISTWIDTH] IN BLOOD BY AUTOMATED COUNT: 16.8 % (ref 11.5–14.5)
ESCHERICHIA COLI: NOT DETECTED
EST. GFR  (NO RACE VARIABLE): >60 ML/MIN/1.73 M^2
GLUCOSE SERPL-MCNC: 101 MG/DL (ref 70–110)
GLUCOSE SERPL-MCNC: 102 MG/DL (ref 70–110)
GLUCOSE SERPL-MCNC: 105 MG/DL (ref 70–110)
GLUCOSE SERPL-MCNC: 91 MG/DL (ref 70–110)
GLUCOSE SERPL-MCNC: 95 MG/DL (ref 70–110)
HAEMOPHILUS INFLUENZAE: NOT DETECTED
HCT VFR BLD AUTO: 22.4 % (ref 40–54)
HGB BLD-MCNC: 7.5 G/DL (ref 14–18)
IMM GRANULOCYTES # BLD AUTO: 0.08 K/UL (ref 0–0.04)
IMM GRANULOCYTES NFR BLD AUTO: 0.8 % (ref 0–0.5)
IMP GENE: ABNORMAL
INR PPP: 2.5 (ref 0.8–1.2)
KLEBSIELLA AEROGENES: NOT DETECTED
KLEBSIELLA OXYTOCA: NOT DETECTED
KLEBSIELLA PNEUMONIAE GROUP: NOT DETECTED
KPC: ABNORMAL
LISTERIA MONOCYTOGENES: NOT DETECTED
LYMPHOCYTES # BLD AUTO: 1 K/UL (ref 1–4.8)
LYMPHOCYTES NFR BLD: 10.3 % (ref 18–48)
MAGNESIUM SERPL-MCNC: 1.8 MG/DL (ref 1.6–2.6)
MCH RBC QN AUTO: 32.2 PG (ref 27–31)
MCHC RBC AUTO-ENTMCNC: 33.5 G/DL (ref 32–36)
MCR-1: ABNORMAL
MCV RBC AUTO: 96 FL (ref 82–98)
MEC A/C AND MREJ (MRSA): ABNORMAL
MEC A/C: ABNORMAL
MONOCYTES # BLD AUTO: 0.8 K/UL (ref 0.3–1)
MONOCYTES NFR BLD: 8.1 % (ref 4–15)
NDM GENE: ABNORMAL
NEISSERIA MENINGITIDIS: NOT DETECTED
NEUTROPHILS # BLD AUTO: 7.8 K/UL (ref 1.8–7.7)
NEUTROPHILS NFR BLD: 78.4 % (ref 38–73)
NRBC BLD-RTO: 0 /100 WBC
OXA-48-LIKE: ABNORMAL
PLATELET # BLD AUTO: 357 K/UL (ref 150–450)
PMV BLD AUTO: 9.7 FL (ref 9.2–12.9)
POTASSIUM SERPL-SCNC: 4 MMOL/L (ref 3.5–5.1)
PROT SERPL-MCNC: 5.6 G/DL (ref 6–8.4)
PROTEUS SPECIES: NOT DETECTED
PROTHROMBIN TIME: 26.3 SEC (ref 9–12.5)
PSEUDOMONAS AERUGINOSA: NOT DETECTED
RBC # BLD AUTO: 2.33 M/UL (ref 4.6–6.2)
SALMONELLA SP: NOT DETECTED
SERRATIA MARCESCENS: NOT DETECTED
SODIUM SERPL-SCNC: 137 MMOL/L (ref 136–145)
SPECIMEN OUTDATE: NORMAL
STAPHYLOCOCCUS AUREUS: NOT DETECTED
STAPHYLOCOCCUS EPIDERMIDIS: NOT DETECTED
STAPHYLOCOCCUS LUGDUNESIS: NOT DETECTED
STAPHYLOCOCCUS SPECIES: NOT DETECTED
STENOTROPHOMONAS MALTOPHILIA: NOT DETECTED
STREPTOCOCCUS AGALACTIAE: NOT DETECTED
STREPTOCOCCUS PNEUMONIAE: NOT DETECTED
STREPTOCOCCUS PYOGENES: NOT DETECTED
STREPTOCOCCUS SPECIES: NOT DETECTED
VAN A/B: ABNORMAL
VIM GENE: ABNORMAL
WBC # BLD AUTO: 9.97 K/UL (ref 3.9–12.7)

## 2023-06-07 PROCEDURE — 97167 OT EVAL HIGH COMPLEX 60 MIN: CPT

## 2023-06-07 PROCEDURE — 94761 N-INVAS EAR/PLS OXIMETRY MLT: CPT

## 2023-06-07 PROCEDURE — 86900 BLOOD TYPING SEROLOGIC ABO: CPT | Performed by: INTERNAL MEDICINE

## 2023-06-07 PROCEDURE — 25000003 PHARM REV CODE 250: Performed by: FAMILY MEDICINE

## 2023-06-07 PROCEDURE — 12000002 HC ACUTE/MED SURGE SEMI-PRIVATE ROOM

## 2023-06-07 PROCEDURE — 63600175 PHARM REV CODE 636 W HCPCS: Performed by: INTERNAL MEDICINE

## 2023-06-07 PROCEDURE — 99231 SBSQ HOSP IP/OBS SF/LOW 25: CPT | Mod: ,,, | Performed by: INTERNAL MEDICINE

## 2023-06-07 PROCEDURE — 85025 COMPLETE CBC W/AUTO DIFF WBC: CPT | Performed by: FAMILY MEDICINE

## 2023-06-07 PROCEDURE — 99231 PR SUBSEQUENT HOSPITAL CARE,LEVL I: ICD-10-PCS | Mod: ,,, | Performed by: INTERNAL MEDICINE

## 2023-06-07 PROCEDURE — 80053 COMPREHEN METABOLIC PANEL: CPT | Performed by: FAMILY MEDICINE

## 2023-06-07 PROCEDURE — 25000003 PHARM REV CODE 250: Performed by: INTERNAL MEDICINE

## 2023-06-07 PROCEDURE — 63600175 PHARM REV CODE 636 W HCPCS: Performed by: FAMILY MEDICINE

## 2023-06-07 PROCEDURE — 85610 PROTHROMBIN TIME: CPT | Performed by: FAMILY MEDICINE

## 2023-06-07 PROCEDURE — 83735 ASSAY OF MAGNESIUM: CPT | Performed by: FAMILY MEDICINE

## 2023-06-07 PROCEDURE — 94660 CPAP INITIATION&MGMT: CPT

## 2023-06-07 PROCEDURE — 97535 SELF CARE MNGMENT TRAINING: CPT

## 2023-06-07 PROCEDURE — 97162 PT EVAL MOD COMPLEX 30 MIN: CPT

## 2023-06-07 PROCEDURE — 99900035 HC TECH TIME PER 15 MIN (STAT)

## 2023-06-07 RX ORDER — FLUCONAZOLE 2 MG/ML
400 INJECTION, SOLUTION INTRAVENOUS
Status: DISCONTINUED | OUTPATIENT
Start: 2023-06-07 | End: 2023-06-08

## 2023-06-07 RX ADMIN — TRAZODONE HYDROCHLORIDE 50 MG: 50 TABLET ORAL at 08:06

## 2023-06-07 RX ADMIN — MEROPENEM 1 G: 1 INJECTION, POWDER, FOR SOLUTION INTRAVENOUS at 01:06

## 2023-06-07 RX ADMIN — FLUCONAZOLE IN SODIUM CHLORIDE 400 MG: 2 INJECTION, SOLUTION INTRAVENOUS at 11:06

## 2023-06-07 RX ADMIN — PANTOPRAZOLE SODIUM 40 MG: 40 TABLET, DELAYED RELEASE ORAL at 05:06

## 2023-06-07 RX ADMIN — MEROPENEM 1 G: 1 INJECTION, POWDER, FOR SOLUTION INTRAVENOUS at 09:06

## 2023-06-07 RX ADMIN — HYDROCODONE BITARTRATE AND ACETAMINOPHEN 1 TABLET: 5; 325 TABLET ORAL at 12:06

## 2023-06-07 RX ADMIN — SODIUM CHLORIDE 125 MG: 9 INJECTION, SOLUTION INTRAVENOUS at 09:06

## 2023-06-07 RX ADMIN — GUAIFENESIN 600 MG: 600 TABLET, EXTENDED RELEASE ORAL at 09:06

## 2023-06-07 RX ADMIN — GUAIFENESIN 600 MG: 600 TABLET, EXTENDED RELEASE ORAL at 08:06

## 2023-06-07 RX ADMIN — ALLOPURINOL 100 MG: 100 TABLET ORAL at 08:06

## 2023-06-07 RX ADMIN — ATORVASTATIN CALCIUM 20 MG: 20 TABLET, FILM COATED ORAL at 08:06

## 2023-06-07 RX ADMIN — FUROSEMIDE 20 MG: 20 INJECTION, SOLUTION INTRAMUSCULAR; INTRAVENOUS at 04:06

## 2023-06-07 RX ADMIN — FUROSEMIDE 20 MG: 20 INJECTION, SOLUTION INTRAMUSCULAR; INTRAVENOUS at 05:06

## 2023-06-07 RX ADMIN — AMIODARONE HYDROCHLORIDE 200 MG: 200 TABLET ORAL at 08:06

## 2023-06-07 RX ADMIN — AMIODARONE HYDROCHLORIDE 200 MG: 200 TABLET ORAL at 09:06

## 2023-06-07 RX ADMIN — WHITE PETROLATUM 41 % TOPICAL OINTMENT: at 09:06

## 2023-06-07 NOTE — SUBJECTIVE & OBJECTIVE
Interval History:  Patient states he was short of breath this morning but this has now improved.  Denies any nausea or vomiting.  States his joints including his elbow, wrist, ankle feel like they are becoming stiff, does report some discomfort.  Continues with gross hematuria.  Afebrile with T-max 98.6°, room air, labs with WBC 9, hemoglobin 7.5, BUN/creatinine 32/1.2, INR 2.5.  Urine culture no growth.  Documented urine output last 24 hours 2650.  Discussed with patient, no visitors at bedside.  Discussed with Infectious Disease yesterday, no indication for antibiotics for multiple decubitus  and urine culture negative, will discontinue and monitor.    Review of Systems   Constitutional:  Negative for fever.   Respiratory:  Positive for shortness of breath.    Skin:  Positive for wound.   Neurological:  Positive for weakness.   Objective:     Vital Signs (Most Recent):  Temp: 96.6 °F (35.9 °C) (06/07/23 0855)  Pulse: 70 (06/07/23 0855)  Resp: 18 (06/07/23 0855)  BP: 113/76 (06/07/23 0855)  SpO2: 97 % (06/07/23 0855) Vital Signs (24h Range):  Temp:  [96.6 °F (35.9 °C)-98.6 °F (37 °C)] 96.6 °F (35.9 °C)  Pulse:  [69-76] 70  Resp:  [18] 18  SpO2:  [92 %-98 %] 97 %  BP: (111-152)/(48-84) 113/76     Weight: 102.7 kg (226 lb 6.6 oz)  Body mass index is 34.43 kg/m².    Intake/Output Summary (Last 24 hours) at 6/7/2023 1112  Last data filed at 6/7/2023 0912  Gross per 24 hour   Intake 1919.8 ml   Output 1900 ml   Net 19.8 ml         Physical Exam  Vitals and nursing note reviewed.   Constitutional:       Comments: Chronically ill-appearing, lying in bed   HENT:      Head: Normocephalic and atraumatic.      Mouth/Throat:      Mouth: Mucous membranes are moist.   Cardiovascular:      Rate and Rhythm: Normal rate and regular rhythm.   Pulmonary:      Comments: Currently on RA  Abdominal:      Comments: Ostomy in place left lower quadrant with semi formed light brown output   Genitourinary:     Comments: Ruelas in place with  gross hematuria  Skin:     Comments: Left upper extremity PICC, multiple decubiti and skin tears   Neurological:      Mental Status: He is alert and oriented to person, place, and time.   Psychiatric:         Mood and Affect: Mood normal.                                 Significant Labs: BMP:   Recent Labs   Lab 06/07/23  0440   GLU 91      K 4.0      CO2 27   BUN 33*   CREATININE 1.2   CALCIUM 8.7   MG 1.8     CBC:   Recent Labs   Lab 06/05/23 1643 06/06/23 0449 06/07/23  0440   WBC 14.71* 9.08 9.97   HGB 8.7* 7.1* 7.5*   HCT 26.2* 21.0* 22.4*   * 334 357     CMP:   Recent Labs   Lab 06/05/23 1643 06/06/23 0449 06/07/23  0440   * 135* 137   K 4.5 3.7 4.0    103 106   CO2 27 28 27   * 81 91   BUN 40* 37* 33*   CREATININE 1.4 1.4 1.2   CALCIUM 9.2 8.6* 8.7   PROT 6.5 5.0* 5.6*   ALBUMIN 2.4* 1.9* 2.0*   BILITOT 0.4 0.5 0.7   ALKPHOS 292* 218* 204*   AST 55* 34 35   ALT 51* 36 35   ANIONGAP 7* 4* 4*     Cardiac Markers: No results for input(s): CKMB, MYOGLOBIN, BNP, TROPISTAT in the last 48 hours.  Lactic Acid:   Recent Labs   Lab 06/05/23  1649   LACTATE 0.9     Magnesium:   Recent Labs   Lab 06/05/23  1643 06/06/23  0449 06/07/23  0440   MG 1.8 1.7 1.8     Troponin:   Recent Labs   Lab 06/05/23 1649 06/05/23  1850   TROPONINIHS 21.1* 22.1*     TSH:   Recent Labs   Lab 03/21/23  0505   TSH 1.009     Urine Culture:   Recent Labs   Lab 06/05/23  1657   LABURIN No growth to date     Urine Studies:   Recent Labs   Lab 06/05/23  1657   COLORU Red*   APPEARANCEUA Cloudy*   PHUR 6.0   SPECGRAV 1.010   PROTEINUA 2+*   GLUCUA Negative   KETONESU Negative   BILIRUBINUA Negative   OCCULTUA 3+*   NITRITE Negative   UROBILINOGEN Negative   LEUKOCYTESUR 1+*   RBCUA >100*   WBCUA 42*   BACTERIA Negative   SQUAMEPITHEL 4   HYALINECASTS 219*       Significant Imaging: I have reviewed all pertinent imaging results/findings within the past 24 hours.

## 2023-06-07 NOTE — PROGRESS NOTES
Consult Note  Infectious Disease    Reason for Consult:  ID judy    HPI: Richard Bustos is a 77 y.o. male known to our service for treatment of stage 4 sacral decubitus, surgically debrided 4/20 with diverting colostomy 4/25 then treated at [facility] through 5/31 then to TCU in same facility. He completed 4+ weeks of broad spectrum antibiotics, adjusted part way through for new tissue cultures with pseudomonas/enterobacter, with history of long term augmentin(6 weeks prior to that admission) for possible osteomyelitis of foot ulcer. He was sent to the ED for concern of Halle's gangrene as scrotal wounds were felt to be worse. He has been CT scanned, seen by wound care and general surgery. Numerous wound photos were reviewed and d/w wound care RN. Wound care MD intends to debride a bit tomorrow and wound vac will be replaced to sacrum.  He submitted superficial swab cultures of the scrotum and sacrum. Additionally he has had gross hematuria the entire time he as at LTAC and SNF, worse with elevated INR,  on coumadin for history of MTHFR mutation, history of prostate cancer, status unknown.     6/7: Interim reviewed.  Afebrile, urine culture negative, so far.  wound cultures have Gram-negative rods as expected.  Ruelas was irrigated and improved in quality.  Urology consult pending.  Hemoglobin is stable at 7.5.  Discussed with his family that he has been in remission from prostate cancer for some time.  They do not know the etiology of the hematuria but comment that he has renal cysts.  He tells me that the catheter was changed today but it actually was changed on the 5th.      Review of patient's allergies indicates:   Allergen Reactions    Donepezil Other (See Comments)     AMS    Bactroban [mupirocin calcium] Blisters     Causes Blisters    Shellfish containing products Other (See Comments)     Other reaction(s): Gout  OYSTERS     Past Medical History:   Diagnosis Date    Anemia     Anemia of other chronic  disease 09/13/2017    Anemia, chronic renal failure 09/13/2017    Anemia, unspecified 09/13/2017    Anticoagulant long-term use     Aorta aneurysm     Arthritis     Atrial fibrillation     Bacteremia due to Streptococcus 01/08/2022    CAD (coronary artery disease)     CHF (congestive heart failure)     Chronic kidney disease     Clotting disorder 09/13/2017    Colon polyp     Dementia     Diabetes mellitus     not on meds    Diabetes mellitus type II     Diverticulosis     Elevated PSA     Encounter for blood transfusion     Former smoker     General anesthetics causing adverse effect in therapeutic use     TROUBLE COMING OUT OF ANESTHESIA WITH GASTRIC BYPASS    GERD (gastroesophageal reflux disease)     Gout     Hx of colonic polyps     Hypercoagulable state     Hyperlipidemia     Hypertension     MI, old 2010    MTHFR mutation     Myocardial infarction     9/2010    Osteomyelitis of ankle or foot 03/09/2017    Prostate cancer 06/2016    Sleep apnea     Squamous cell carcinoma 01/23/2018    Left helix, imiquimod    Supratherapeutic INR 4/19/2023    Venous stasis     Chronic     Past Surgical History:   Procedure Laterality Date    ABDOMINAL SURGERY      CARDIAC SURGERY      3 STENTS     CAUDAL EPIDURAL STEROID INJECTION N/A 6/22/2020    Procedure: INJECTION, STEROID, SPINE, EPIDURAL, CAUDAL;  Surgeon: Evangelist Bose MD;  Location: [facility] OR;  Service: Pain Management;  Laterality: N/A;    COLONOSCOPY  02/2011    diverticulosis with diverticula with clot, no active bleeding    COLONOSCOPY N/A 8/24/2016    Procedure: COLONOSCOPY;  Surgeon: Saroj Borjas MD;  Location: [facility];  Service: Endoscopy;  Laterality: N/A;    COLONOSCOPY N/A 11/18/2020    Procedure: COLONOSCOPY;  Surgeon: Saroj Borjas MD;  Location: [facility];  Service: Endoscopy;  Laterality: N/A;    COLONOSCOPY N/A 10/27/2021    Procedure: COLONOSCOPY;  Surgeon: Saroj Borjas MD;  Location: [facility];  Service: Endoscopy;  Laterality: N/A;     CREATION, COLOSTOMY, LAPAROSCOPIC N/A 4/25/2023    Procedure: CREATION, COLOSTOMY, LAPAROSCOPIC;  Surgeon: Mark Martinez Jr., MD;  Location: [facility] OR;  Service: General;  Laterality: N/A;    DEBRIDEMENT OF SACRAL WOUND N/A 4/20/2023    Procedure: DEBRIDEMENT, WOUND, SACRUM;  Surgeon: Mark Martinez Jr., MD;  Location: [facility] OR;  Service: General;  Laterality: N/A;    EPIDURAL STEROID INJECTION INTO LUMBAR SPINE N/A 6/22/2020    Procedure: Injection-steroid-epidural-lumbar;  Surgeon: Evangelist Bose MD;  Location: [facility] OR;  Service: Pain Management;  Laterality: N/A;  L3 L4 L5 S1    EPIDURAL STEROID INJECTION INTO LUMBAR SPINE N/A 9/21/2020    Procedure: Injection-steroid-epidural-lumbar;  Surgeon: Evangelist Bose MD;  Location: [facility] OR;  Service: Pain Management;  Laterality: N/A;  L5-S1    FUSION, SPINE, CERVICAL N/A 3/24/2023    Procedure: FUSION, SPINE, CERVICAL;  Surgeon: Kike Kc DO;  Location: [facility] OR;  Service: Neurosurgery;  Laterality: N/A;  posterior cervical decompression/fusion C3-T1    GASTRIC BYPASS  2/5/2008    IVC FILTER RETRIEVAL      JOINT REPLACEMENT  1996 and 2001    bi-lat hip replacement/Rt Hip and Lt Hip    RADIOFREQUENCY ABLATION OF LUMBAR MEDIAL BRANCH NERVE AT SINGLE LEVEL Bilateral 1/10/2020    Procedure: Radiofrequency Ablation, Nerve, Spinal, Lumbar, Medial Branch, 1 Level;  Surgeon: Evangelist Bose MD;  Location: [facility] OR;  Service: Pain Management;  Laterality: Bilateral;  L3,L4,L5    RADIOFREQUENCY ABLATION OF LUMBAR MEDIAL BRANCH NERVE AT SINGLE LEVEL Bilateral 11/23/2021    Procedure: Radiofrequency Ablation, Nerve, Spinal, Lumbar, Medial Branch, Bilateral L 3,4,5;  Surgeon: Nile Cuasey MD;  Location: [facility] OR;  Service: Pain Management;  Laterality: Bilateral;    ROTATOR CUFF REPAIR      Rt shoulder    Stents  8/18/2010    x 3    UPPER GASTROINTESTINAL ENDOSCOPY  02/2011         EXAM & DIAGNOSTICS REVIEWED:   Vitals:     Temp:  [96.6 °F (35.9 °C)-98.6 °F (37  °C)]   Temp: 98.1 °F (36.7 °C) (06/07/23 1248)  Pulse: 72 (06/07/23 1248)  Resp: 16 (06/07/23 1251)  BP: 133/71 (06/07/23 1248)  SpO2: 98 % (06/07/23 1248)    Intake/Output Summary (Last 24 hours) at 6/7/2023 1546  Last data filed at 6/7/2023 1257  Gross per 24 hour   Intake 2019.8 ml   Output 2725 ml   Net -705.2 ml      General:  In NAD. Alert and attentive, cooperative, comfortable  Eyes:  Anicteric,   EOMI , resolving facial ecchymosis  ENT:  No ulcers, exudates, thrush, nares patent,    Neck:  supple,   Lungs: Breathing comfortably  Heart:  RRR,    Abd:  Soft, obese, LLQ ostomy with yellow stool, NT, ND,    :   Ruelas, lighter hematuria, Ruelas changed 6/5  Musc:  Joints without effusion, swelling, erythema, synovitis, with generalized muscle wasting.   Skin:  No rashes.    Neuro:             Alert, attentive, speech fluent, face symmetric, moves all extremities,  not Ambulatory and profoundly weak in general.  History is unreliable  Psych: Calm, pleasant, cooperative  Lymphatic:      Extrem: Mild extremity edema, without cellulitis,      VAD:   Picc left arm 4/26    Isolation:  contact  Wound:   Erosion on dorsal penis    Base of scrotum    sacrum     Small amount of bone palpable per wound care nurse(this is not new)               General Labs reviewed:  Recent Labs   Lab 06/05/23  1643 06/06/23  0449 06/07/23  0440   WBC 14.71* 9.08 9.97   HGB 8.7* 7.1* 7.5*   HCT 26.2* 21.0* 22.4*   * 334 357       Recent Labs   Lab 06/05/23  1643 06/06/23  0449 06/07/23  0440   * 135* 137   K 4.5 3.7 4.0    103 106   CO2 27 28 27   BUN 40* 37* 33*   CREATININE 1.4 1.4 1.2   CALCIUM 9.2 8.6* 8.7   PROT 6.5 5.0* 5.6*   BILITOT 0.4 0.5 0.7   ALKPHOS 292* 218* 204*   ALT 51* 36 35   AST 55* 34 35     Recent Labs   Lab 06/05/23  1643   CRP 6.79*     Procal 0/73  Lactic normal      Micro:  Microbiology Results (last 7 days)       Procedure Component Value Units Date/Time    Aerobic culture [[id number]]   (Abnormal) Collected: 06/06/23 1059    Order Status: Completed Specimen: Wound from Sacrum Updated: 06/07/23 1250     Aerobic Bacterial Culture GRAM NEGATIVE VON, NON-LACTOSE   Moderate  Identification and susceptibility pending      Narrative:      Sacrum    Aerobic culture [[id number]]  (Abnormal) Collected: 06/06/23 1057    Order Status: Completed Specimen: Wound from Perineum Updated: 06/07/23 1246     Aerobic Bacterial Culture GRAM NEGATIVE VON, NON-LACTOSE   Few  Identification and susceptibility pending      Narrative:      Wound of the perineum/scrotum    Urine culture [[id number]] Collected: 06/05/23 1657    Order Status: Completed Specimen: Urine Updated: 06/07/23 0916     Urine Culture, Routine No growth to date    Narrative:      Specimen Source->Urine    Blood culture x two cultures. Draw prior to antibiotics [[id number]] Collected: 06/05/23 1644    Order Status: Completed Specimen: Blood from Line, PICC Left Cephalic Updated: 06/06/23 1832     Blood Culture, Routine No Growth to date      No Growth to date    Narrative:      Aerobic and anaerobic    Blood culture x two cultures. Draw prior to antibiotics [[id number]] Collected: 06/05/23 1643    Order Status: Completed Specimen: Blood from Line, PICC Left Basilic Updated: 06/06/23 1832     Blood Culture, Routine No Growth to date      No Growth to date    Narrative:      Aerobic and anaerobic    Culture, Anaerobic [[id number]] Collected: 06/06/23 1059    Order Status: Sent Specimen: Wound from Sacrum Updated: 06/06/23 1106    Culture, Anaerobic [[id number]] Collected: 06/06/23 1057    Order Status: Sent Specimen: Wound from Perineum Updated: 06/06/23 1104    Aerobic culture [[id number]]     Order Status: Canceled Specimen: Wound from Sacrum             Imaging Reviewed:   CXR   CT abd/pelvis    Cardiology:    IMPRESSION & PLAN   1. S/p treatment for acute osteomyelitis of sacrum    2. Numerous other wounds, including erosions of base of  scrotum with no clinical evidence of nikos's gangrene.  Superficial cultures of the wound are growing Gram-negative rods which is expected and need not be treated.    3. Persistent gross hematuria, for many weeks, on coumadin for MTHFR   INR down  4. Numerous other co-morbidities    Recommendations:  See no indication to continue antibiotics for the wounds       Superficial cultures of the wound are growing Gram-negative rods which is expected and need not be treated.  As the urine culture is negative antibiotics can be discontinued (and already were)     urology consult in progress    Discussed with his family at the bedside about colonization of his wounds  Medical Decision Making during this encounter was  [_] Low Complexity  [_] Moderate Complexity  [xx  ] High Complexity

## 2023-06-07 NOTE — PLAN OF CARE
Problem: Occupational Therapy  Goal: Occupational Therapy Goal  Description: Goals to be met by: 7/5/2023     Patient will increase functional independence with ADLs by performing:    Feeding with Supervision while seated upright in bed.  Grooming while in bed with Supervision.  Supine to sit with Moderate Assistance.  Bilateral Upper extremity exercise program, with supervision.    Outcome: Ongoing, Progressing

## 2023-06-07 NOTE — PROGRESS NOTES
Pharmacy Consult Discontinuation: Vancomycin    Richard Bustos [id number] is a 77 y.o. male was consulted for vancomycin pharmacotherapy management by pharmacy.    Pharmacy consult for vancomycin dosing is no longer required.  Vancomycin was discontinued 06/07/2023 @ 1103 by Dr Valle.    Thank you for allowing us to participate in this patient's care. Should you have any questions or concerns please feel free to contact the pharmacy department at [phone].    Bernard Naidu, JuanD

## 2023-06-07 NOTE — PLAN OF CARE
AAOx4. VSS. Pt still has not received his home BiPAP. Wound care completed today. Pt in speciality bed. Turning Q2H.. PICC in place. Pt using call light. Contact precautions maintained. Pt requires 1:1 feeding due to weakness. Hematuria continues in mcdaniels catheter, see previous note. Dr. Moss to come to bedside to irrigate mcdaniels. Colostomy + output.        Problem: Infection  Goal: Absence of Infection Signs and Symptoms  Outcome: Ongoing, Progressing     Problem: Adult Inpatient Plan of Care  Goal: Plan of Care Review  Outcome: Ongoing, Progressing  Goal: Patient-Specific Goal (Individualized)  Outcome: Ongoing, Progressing  Goal: Absence of Hospital-Acquired Illness or Injury  Outcome: Ongoing, Progressing     Problem: Impaired Wound Healing  Goal: Optimal Wound Healing  Outcome: Ongoing, Progressing

## 2023-06-07 NOTE — PLAN OF CARE
06/07/23 1203   Post-Acute Status   Post-Acute Authorization Placement   Post-Acute Placement Status Pending payor review/awaiting authorization (if required)     Riri to submit for snf auth on this date. Citlali HOLALND notified.

## 2023-06-07 NOTE — PROGRESS NOTES
Reulas catheter irrigated per Dr. Moss's request. Irrigated with 1L nacl, 1 L returned. Urine went from red with clots to clear. Clots were returned with aspirating urine. Ruelas catheter remains in place. Dr. Moss updated.

## 2023-06-07 NOTE — PT/OT/SLP EVAL
Physical Therapy Evaluation    Patient Name:  Richard Bustos   MRN:  [id number]    Recommendations:     Discharge Recommendations: LTACH (long-term acute care hospital)   Discharge Equipment Recommendations: to be determined by next level of care   Barriers to discharge: None    Assessment:     Richard Bustos is a 77 y.o. male admitted with a medical diagnosis of Hematuria.  He presents with the following impairments/functional limitations: impaired endurance, weakness, impaired self care skills, impaired functional mobility, decreased upper extremity function, decreased lower extremity function, pain, impaired skin, orthopedic precautions. Patient is agreeable to participation with PT evaluation. He endorses left knee pain at rest. He was admitted from [facility] and chart review indicates patient was transferring to dale chair via ana lift. Patient with right distal fibular fracture being treated non-surgically. Attempted rolling with TotalA x2 and patient unable to tolerate. He will be seen QD3 to determine activity tolerance.      Rehab Prognosis: Fair; patient would benefit from acute skilled PT services to address these deficits and reach maximum level of function.    Recent Surgery: * No surgery found *      Plan:     During this hospitalization, patient to be seen 3 x/week to address the identified rehab impairments via therapeutic activities, therapeutic exercises, neuromuscular re-education and progress toward the following goals:    Plan of Care Expires:  07/07/23    Subjective     Chief Complaint: LK pain  Patient/Family Comments/goals: none verbalized  Pain/Comfort:  Pain Rating 1:  (not rated)  Location - Side 1: Left  Location 1: knee  Pain Addressed 1: Reposition, Distraction    Patients cultural, spiritual, Judaism conflicts given the current situation:      Living Environment:  Patient was admitted from [facility]  Prior to admission, patients level of function was transferring to dale chair via ana lift.   "Equipment used at home: wheelchair, hospital bed, lift device.  DME owned (not currently used): none.  Upon discharge, patient will have assistance from LTAC.    Objective:     Communicated with SANDRINE Lr prior to session.  Patient found HOB elevated with colostomy, mcdaniels catheter, PICC line, telemetry  upon PT entry to room.    General Precautions: Standard, contact, fall  Orthopedic Precautions:RLE non weight bearing (right distal fibular fracture (non-operative))   Braces: N/A  Respiratory Status: Room air    Exams:  RLE Strength: 2-/5  LLE Strength: 2-/5    Functional Mobility:  Bed Mobility:     Rolling right:  total assistance and of 2 persons      AM-PAC 6 CLICK MOBILITY  Total Score:7       Treatment & Education:  Patient was educated on the importance of OOB activity and functional mobility to negate negative effects of prolonged bed rest during hospitalization, safe transfers, and D/C planning     Patient with right distal fibula fracture. Ortho note states "considering his nonambulatory status and his multiple wounds/decubiti to this lower extremity, we will leave open.  He is in a soft dressing.  This is more than sufficient to protect.  Should heal on its own without any further intervention over the next 6-8 weeks.  No surgery is planned for this current time"    Patient left HOB elevated with all lines intact, call button in reach, and RN notified.    GOALS:   Multidisciplinary Problems       Physical Therapy Goals          Problem: Physical Therapy    Goal Priority Disciplines Outcome Goal Variances Interventions   Physical Therapy Goal     PT, PT/OT      Description: Goals to be met by: 23    Patient will increase functional independence with mobility by performin. Supine to sit with Maximum Assistance  2. Rolling to Left and Right with Moderate Assistance.  3. Lower extremity exercise program x20 reps per handout, with assistance as needed.                          History:     Past " Medical History:   Diagnosis Date    Anemia     Anemia of other chronic disease 09/13/2017    Anemia, chronic renal failure 09/13/2017    Anemia, unspecified 09/13/2017    Anticoagulant long-term use     Aorta aneurysm     Arthritis     Atrial fibrillation     Bacteremia due to Streptococcus 01/08/2022    CAD (coronary artery disease)     CHF (congestive heart failure)     Chronic kidney disease     Clotting disorder 09/13/2017    Colon polyp     Dementia     Diabetes mellitus     not on meds    Diabetes mellitus type II     Diverticulosis     Elevated PSA     Encounter for blood transfusion     Former smoker     General anesthetics causing adverse effect in therapeutic use     TROUBLE COMING OUT OF ANESTHESIA WITH GASTRIC BYPASS    GERD (gastroesophageal reflux disease)     Gout     Hx of colonic polyps     Hypercoagulable state     Hyperlipidemia     Hypertension     MI, old 2010    MTHFR mutation     Myocardial infarction     9/2010    Osteomyelitis of ankle or foot 03/09/2017    Prostate cancer 06/2016    Sleep apnea     Squamous cell carcinoma 01/23/2018    Left helix, imiquimod    Supratherapeutic INR 4/19/2023    Venous stasis     Chronic       Past Surgical History:   Procedure Laterality Date    ABDOMINAL SURGERY      CARDIAC SURGERY      3 STENTS     CAUDAL EPIDURAL STEROID INJECTION N/A 6/22/2020    Procedure: INJECTION, STEROID, SPINE, EPIDURAL, CAUDAL;  Surgeon: Evangelist Bose MD;  Location: [facility] OR;  Service: Pain Management;  Laterality: N/A;    COLONOSCOPY  02/2011    diverticulosis with diverticula with clot, no active bleeding    COLONOSCOPY N/A 8/24/2016    Procedure: COLONOSCOPY;  Surgeon: Saroj Borjas MD;  Location: [facility];  Service: Endoscopy;  Laterality: N/A;    COLONOSCOPY N/A 11/18/2020    Procedure: COLONOSCOPY;  Surgeon: Saroj Borjas MD;  Location: [facility];  Service: Endoscopy;  Laterality: N/A;    COLONOSCOPY N/A 10/27/2021    Procedure: COLONOSCOPY;  Surgeon: Saroj BUNDY  MD Suad;  Location: [facility] ENDO;  Service: Endoscopy;  Laterality: N/A;    CREATION, COLOSTOMY, LAPAROSCOPIC N/A 4/25/2023    Procedure: CREATION, COLOSTOMY, LAPAROSCOPIC;  Surgeon: Mark Martinez Jr., MD;  Location: [facility] OR;  Service: General;  Laterality: N/A;    DEBRIDEMENT OF SACRAL WOUND N/A 4/20/2023    Procedure: DEBRIDEMENT, WOUND, SACRUM;  Surgeon: Mark Martinez Jr., MD;  Location: [facility] OR;  Service: General;  Laterality: N/A;    EPIDURAL STEROID INJECTION INTO LUMBAR SPINE N/A 6/22/2020    Procedure: Injection-steroid-epidural-lumbar;  Surgeon: Evangelist Bose MD;  Location: [facility] OR;  Service: Pain Management;  Laterality: N/A;  L3 L4 L5 S1    EPIDURAL STEROID INJECTION INTO LUMBAR SPINE N/A 9/21/2020    Procedure: Injection-steroid-epidural-lumbar;  Surgeon: Evangelist Bose MD;  Location: [facility] OR;  Service: Pain Management;  Laterality: N/A;  L5-S1    FUSION, SPINE, CERVICAL N/A 3/24/2023    Procedure: FUSION, SPINE, CERVICAL;  Surgeon: Kike Kc DO;  Location: [facility] OR;  Service: Neurosurgery;  Laterality: N/A;  posterior cervical decompression/fusion C3-T1    GASTRIC BYPASS  2/5/2008    IVC FILTER RETRIEVAL      JOINT REPLACEMENT  1996 and 2001    bi-lat hip replacement/Rt Hip and Lt Hip    RADIOFREQUENCY ABLATION OF LUMBAR MEDIAL BRANCH NERVE AT SINGLE LEVEL Bilateral 1/10/2020    Procedure: Radiofrequency Ablation, Nerve, Spinal, Lumbar, Medial Branch, 1 Level;  Surgeon: Evangelist Bose MD;  Location: [facility] OR;  Service: Pain Management;  Laterality: Bilateral;  L3,L4,L5    RADIOFREQUENCY ABLATION OF LUMBAR MEDIAL BRANCH NERVE AT SINGLE LEVEL Bilateral 11/23/2021    Procedure: Radiofrequency Ablation, Nerve, Spinal, Lumbar, Medial Branch, Bilateral L 3,4,5;  Surgeon: Nile Causey MD;  Location[facility];  Service: Pain Management;  Laterality: Bilateral;    ROTATOR CUFF REPAIR      Rt shoulder    Stents  8/18/2010    x 3    UPPER GASTROINTESTINAL ENDOSCOPY  02/2011       Time  Tracking:     PT Received On: 06/07/23  PT Start Time: 0957     PT Stop Time: 1011  PT Total Time (min): 14 min     Billable Minutes: Evaluation 14      06/07/2023

## 2023-06-07 NOTE — PROGRESS NOTES
[facility] Consult Note - [place] [facility]  Staff: MD Isa  Group:Isa/Becca/Rod/Galina/Dick  Referring provider and please cc:    PCP: Familia Ramirez Jr, MD  Date of Service: 06/07/2023        Subjective:      Interval history 1/21/21:  He has a h/o UUI but was only voiding 4x a day. Was having 1 leak a day if he held it too long. Had put him on myrbetriq 50mg which helped some? Today he states wears thin pad (1 a day) with occ leaks unchanged and voids about 4x a day with rare urgency if he holds too long again.      He has a h/o Gl 4 prostate cancer sp XRT completed in 2016 and ADT x 2.5 years. Most recent psa <0.1.     Also has a h/o complex right renal cyst. Last rbus 1/14/2021 shows  There are a couple of simple renal cyst present with the largest measuring 5.6 x 4.4 x 4.4 cm.  Within the lower pole, there appears to be a complex hypoechoic cyst measuring 2.8 x 3.4 x 4.7 cm in size, not significantly changed when compared to the prior study when this measured 4.2 x 4.1 x 4.5 cm.  No renal stone. No hydronephrosis. Left side previously showed stone but this one did not.      I reviewed his previous urinalysis and cultures as well as his urinalysis today. His urinalysis today shows no abnormalities except 100 protein.      Interval history 10/14/21:  psa was 0.01 on 1/28/21  3/24/21 restart mybetriq 50mg once daily again. Had stopped bc unsure if it was helping but appears it was helping since daughter states leakage and frequency worsened off of it.   4/26/21 saw marin and pvr: 0. Still having reanna urgency but went over some basic recs.   psa 0.02 on 7/29, then 8/18 then 10/7 (not sure it was checked so manytimes)    Interval history 1/25/22:  On myrbetriq 50mg daily no longer leaking urine. No urine frequency.   rbus 1/14/21 for RLP complex cyst: complex hypoechoic cyst measuring 2.8 x 3.4 x 4.7 cm in size, not significantly changed since June 2020 when compared to the  prior study when this measured 4.2 x 4.1 x 4.5 cm. b simple renal cysts.   psa 1/4/22 0.03 (rising)  Just released from hospital for sepsis secondary to strep (ua was neg)      Interval history 4/26/22:  4/13/22  psa 0.03 psa the same.   No complaints right now. Doing well with no incontinence.   Void: neg     Interval history 11/17/22:  Had him Continue myrbetriq 50mg daily for oab and urge incontinence. Has accident 1-2x/day (usually just 1x a day). Hard to get to bathroom bc he wears suspenders making it hard to get down.   Repeat psa 11/3/22 0.03 (same since jan 2022).     consult by ID in hospital 6/7/23  ID note: Richard Bustos is a 77 y.o. male known to our service for   treatment of stage 4 sacral decubitus, surgically debrided 4/20   with diverting colostomy 4/25 then [facility]   [facility] then to TCU in same facility. He completed 4+ weeks of broad   spectrum antibiotics, adjusted part way through for new tissue   cultures with pseudomonas/enterobacter, with history of long term   augmentin(6 weeks prior to that admission) for possible   osteomyelitis of foot ulcer. 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 He   has been CT scanned, seen by wound care and general surgery.   Numerous wound photos were reviewed and d/w wound care RN. Wound   care MD intends to debride a bit tomorrow and wound vac will be   replaced to sacrum. He submitted superficial swab cultures of   the scrotum and sacrum. Additionally he has had gross hematuria   the entire time he as at LTAC and SNF, worse with elevated INR,   on coumadin for history of MTHFR mutation, history of prostate   cancer, status unknown.     Initial consult by me in hospital on 6/7/23:  He says he has had hematuria for the last month. Inr has been out of range. On coumadin last dose 6/3  Thye irrigated him with a liter today. Cleared up quickly ater clots removed.   Then started turning red  again.   I rrigated his catheter, cleared up with in 2 syringes. Added 20cc more to the balloon then put it on a little traction.   Ctap wo 6/6/23: no hydro. No stones.        Current REVIEW OF SYSTEMS: as above     PSA History: no fam hx of prostate cancer   11/3/22            0.03  4/13/22            0.03  1/25/22            Pvr: 197 but voided 2 hours ago  1/4/22              0.03  Component PSA Diagnostic   Latest Ref Rng & Units 0.00 - 4.00 ng/mL   10/7/2021 0.02   8/18/2021 0.02   7/29/2021 0.02   1/28/2021 0.01   1/14/2021 <0.10   6/4/2020 <0.10   12/2/2019 <0.10   5/10/2019 <0.10   4/24/2019 <0.01   4/23/2019 <0.10   1/15/2019 <0.01   10/16/2018 <0.01   7/5/2018 <0.01   1/4/2018 <0.01   11/21/2017 6/12/2017 <0.01   12/12/2016  Gl 4 prostate cancer sp XRT completed in 2016 and ADT x 2.5 years <0.01      8/1/2016 3.3   3/18/2016 7.7 (H)      Urine history:   1/6/22  1+prot      LABS REVIEW:  Recent labs:   Recent Labs   Lab 06/05/23  1643 06/06/23  0449 06/07/23  0440   WBC 14.71 H 9.08 9.97   Hemoglobin 8.7 L 7.1 L 7.5 L   Hematocrit 26.2 L 21.0 L 22.4 L   Platelets 457 H 334 357   ]  Recent Labs   Lab 04/27/23  0359 04/28/23  0415 04/29/23  0500 06/05/23  1643 06/06/23  0449 06/07/23  0440   Sodium 143 143   < > 135 L 135 L 137   Potassium 3.3 L 3.4 L   < > 4.5 3.7 4.0   Chloride 120 H 120 H   < > 101 103 106   CO2 18 L 18 L   < > 27 28 27   BUN 38 H 30 H   < > 40 H 37 H 33 H   Creatinine 1.7 H 1.4   < > 1.4 1.4 1.2   Glucose 71 82   < > 122 H 81 91   Calcium 8.2 L 8.4 L   < > 9.2 8.6 L 8.7   Magnesium 1.7 1.6  --  1.8 1.7 1.8   Phosphorus 3.5 2.8  --  4.5  --   --    Alkaline Phosphatase 178 H 193 H   < > 292 H 218 H 204 H   Total Protein 4.3 L 4.8 L   < > 6.5 5.0 L 5.6 L   Albumin 1.7 L 1.9 L   < > 2.4 L 1.9 L 2.0 L   Total Bilirubin 0.7 0.8   < > 0.4 0.5 0.7   AST 38 45 H   < > 55 H 34 35   ALT 45 H 45 H   < > 51 H 36 35    < > = values in this interval not displayed.   ]    Urine history:   Component  Urine Culture, Routine   Latest Ref Rng & Units    6/5/2023 No growth to date   4/12/2018 No significant growth   11/4/2014 No growth     PSA History: no fam hx of prostate cancer   11/3/22            0.03  4/13/22            0.03  1/25/22            Pvr: 197 but voided 2 hours ago  1/4/22              0.03  Component PSA Diagnostic   Latest Ref Rng & Units 0.00 - 4.00 ng/mL   10/7/2021 0.02   8/18/2021 0.02   7/29/2021 0.02   1/28/2021 0.01   1/14/2021 <0.10   6/4/2020 <0.10   12/2/2019 <0.10   5/10/2019 <0.10   4/24/2019 <0.01   4/23/2019 <0.10   1/15/2019 <0.01   10/16/2018 <0.01   7/5/2018 <0.01   1/4/2018 <0.01   11/21/2017 6/12/2017 <0.01   12/12/2016  Gl 4 prostate cancer sp XRT completed in 2016 and ADT x 2.5 years <0.01      8/1/2016 3.3   3/18/2016 7.7 (H)      Urine history:   1/6/22  1+prot       Current REVIEW OF SYSTEMS:  Negative for the remaining 12 points of ROS except for as stated above       PMHx:  Past Medical History:   Diagnosis Date    Anemia     Anemia of other chronic disease 09/13/2017    Anemia, chronic renal failure 09/13/2017    Anemia, unspecified 09/13/2017    Anticoagulant long-term use     Aorta aneurysm     Arthritis     Atrial fibrillation     Bacteremia due to Streptococcus 01/08/2022    CAD (coronary artery disease)     CHF (congestive heart failure)     Chronic kidney disease     Clotting disorder 09/13/2017    Colon polyp     Dementia     Diabetes mellitus     not on meds    Diabetes mellitus type II     Diverticulosis     Elevated PSA     Encounter for blood transfusion     Former smoker     General anesthetics causing adverse effect in therapeutic use     TROUBLE COMING OUT OF ANESTHESIA WITH GASTRIC BYPASS    GERD (gastroesophageal reflux disease)     Gout     Hx of colonic polyps     Hypercoagulable state     Hyperlipidemia     Hypertension     MI, old 2010    MTHFR mutation     Myocardial infarction     9/2010    Osteomyelitis of ankle or foot 03/09/2017    Prostate  cancer 06/2016    Sleep apnea     Squamous cell carcinoma 01/23/2018    Left helix, imiquimod    Supratherapeutic INR 4/19/2023    Venous stasis     Chronic       PSHx:  Past Surgical History:   Procedure Laterality Date    ABDOMINAL SURGERY      CARDIAC SURGERY      3 STENTS     CAUDAL EPIDURAL STEROID INJECTION N/A 6/22/2020    Procedure: INJECTION, STEROID, SPINE, EPIDURAL, CAUDAL;  Surgeon: Evangelist Bose MD;  Location: [facility] OR;  Service: Pain Management;  Laterality: N/A;    COLONOSCOPY  02/2011    diverticulosis with diverticula with clot, no active bleeding    COLONOSCOPY N/A 8/24/2016    Procedure: COLONOSCOPY;  Surgeon: Saroj Borjas MD;  Location: [facility] ENDO;  Service: Endoscopy;  Laterality: N/A;    COLONOSCOPY N/A 11/18/2020    Procedure: COLONOSCOPY;  Surgeon: Saroj Borjas MD;  Location: [facility] ENDO;  Service: Endoscopy;  Laterality: N/A;    COLONOSCOPY N/A 10/27/2021    Procedure: COLONOSCOPY;  Surgeon: Saroj Borjas MD;  Location: [facility] ENDO;  Service: Endoscopy;  Laterality: N/A;    CREATION, COLOSTOMY, LAPAROSCOPIC N/A 4/25/2023    Procedure: CREATION, COLOSTOMY, LAPAROSCOPIC;  Surgeon: Mark Martinze Jr., MD;  Location: [facility] OR;  Service: General;  Laterality: N/A;    DEBRIDEMENT OF SACRAL WOUND N/A 4/20/2023    Procedure: DEBRIDEMENT, WOUND, SACRUM;  Surgeon: Mark Martinez Jr., MD;  Location: [facility] OR;  Service: General;  Laterality: N/A;    EPIDURAL STEROID INJECTION INTO LUMBAR SPINE N/A 6/22/2020    Procedure: Injection-steroid-epidural-lumbar;  Surgeon: Evangelist Bose MD;  Location: [facility] OR;  Service: Pain Management;  Laterality: N/A;  L3 L4 L5 S1    EPIDURAL STEROID INJECTION INTO LUMBAR SPINE N/A 9/21/2020    Procedure: Injection-steroid-epidural-lumbar;  Surgeon: Evangelist Bose MD;  Location: [facility] OR;  Service: Pain Management;  Laterality: N/A;  L5-S1    FUSION, SPINE, CERVICAL N/A 3/24/2023    Procedure: FUSION, SPINE, CERVICAL;  Surgeon: Kike Kc DO;   Location: [facility] OR;  Service: Neurosurgery;  Laterality: N/A;  posterior cervical decompression/fusion C3-T1    GASTRIC BYPASS  2/5/2008    IVC FILTER RETRIEVAL      JOINT REPLACEMENT  1996 and 2001    bi-lat hip replacement/Rt Hip and Lt Hip    RADIOFREQUENCY ABLATION OF LUMBAR MEDIAL BRANCH NERVE AT SINGLE LEVEL Bilateral 1/10/2020    Procedure: Radiofrequency Ablation, Nerve, Spinal, Lumbar, Medial Branch, 1 Level;  Surgeon: Evangelist Bose MD;  Location: [facility] OR;  Service: Pain Management;  Laterality: Bilateral;  L3,L4,L5    RADIOFREQUENCY ABLATION OF LUMBAR MEDIAL BRANCH NERVE AT SINGLE LEVEL Bilateral 11/23/2021    Procedure: Radiofrequency Ablation, Nerve, Spinal, Lumbar, Medial Branch, Bilateral L 3,4,5;  Surgeon: Nile Causey MD;  Location: [facility] OR;  Service: Pain Management;  Laterality: Bilateral;    ROTATOR CUFF REPAIR      Rt shoulder    Stents  8/18/2010    x 3    UPPER GASTROINTESTINAL ENDOSCOPY  02/2011       Fam Hx:  Reviewed- pertinent family hx as above    Soc Hx:  Social History     Tobacco Use    Smoking status: Former    Smokeless tobacco: Never    Tobacco comments:     47 yrs ago, only smoked 3-4 packs in his entire life as a teenager   Substance Use Topics    Alcohol use: Not Currently     Comment: rare    Drug use: No       Allergies:  Donepezil, Bactroban [mupirocin calcium], and Shellfish containing products    Anticoagulation/Aspirin: coumadin    Objective:     Vitals:    06/07/23 1251   BP:    Pulse:    Resp: 16   Temp:        See hpi for catheter info    Pertinent urologic PATHOLOGY AND RADIOLOGY REVIEW:  See hpi for recent      Assessment:     Richard Bustos is a 77 y.o. male with   1. Pneumoperitoneum of unknown etiology    2. Wound check, abscess    3. Osteomyelitis    4. Chest pain    5. Pressure injury of sacral region, stage 4 [L89.154 (ICD-10-CM)]        urology consulted for:  Gross hematuria. H/o radiation for prostate cancer.      Hematuria could be Ruelas trauma, prostate  irritation from Ruelas with abnormal INR and Coumadin use.  Could also have radiation cystitis.  I think it is probably the prostate that is bleeding.  Irrigated today with a L by nursing.  I would irrigate him it cleared up very quickly.  Put him on a little traction.    Plan:     Continue to irrigate prn   If needed may start him on cbi      Adeline Moss MD

## 2023-06-07 NOTE — CONSULTS
Chief complaint:  Wound Check and r fibula fracture      HPI:  Richard Bustos is a 77 y.o. male presenting with multiple wounds. Pt is known to me from prior hospital visits and following at [facility]. Pt presents today from [facility] transfer with Open wounds to the sacrum, penis, scrotum, left arm skin tear, multiple bruising on both legs and arms, posterior right thigh pressure ulcer from his vac tube, left heel DTI, right heel unstagable pressure ulcer and a right great toe DM ulcer. All wounds are POA.      PMH:  As per HPI and below:  Past Medical History:   Diagnosis Date    Anemia     Anemia of other chronic disease 09/13/2017    Anemia, chronic renal failure 09/13/2017    Anemia, unspecified 09/13/2017    Anticoagulant long-term use     Aorta aneurysm     Arthritis     Atrial fibrillation     Bacteremia due to Streptococcus 01/08/2022    CAD (coronary artery disease)     CHF (congestive heart failure)     Chronic kidney disease     Clotting disorder 09/13/2017    Colon polyp     Dementia     Diabetes mellitus     not on meds    Diabetes mellitus type II     Diverticulosis     Elevated PSA     Encounter for blood transfusion     Former smoker     General anesthetics causing adverse effect in therapeutic use     TROUBLE COMING OUT OF ANESTHESIA WITH GASTRIC BYPASS    GERD (gastroesophageal reflux disease)     Gout     Hx of colonic polyps     Hypercoagulable state     Hyperlipidemia     Hypertension     MI, old 2010    MTHFR mutation     Myocardial infarction     9/2010    Osteomyelitis of ankle or foot 03/09/2017    Prostate cancer 06/2016    Sleep apnea     Squamous cell carcinoma 01/23/2018    Left helix, imiquimod    Supratherapeutic INR 4/19/2023    Venous stasis     Chronic       Social History     Socioeconomic History    Marital status:    Tobacco Use    Smoking status: Former    Smokeless tobacco: Never    Tobacco comments:     45 yrs ago, only smoked 3-4 packs in his entire life as a teenager    Substance and Sexual Activity    Alcohol use: Not Currently     Comment: rare    Drug use: No    Sexual activity: Not Currently     Social Determinants of Health     Financial Resource Strain: Low Risk     Difficulty of Paying Living Expenses: Not hard at all   Food Insecurity: No Food Insecurity    Worried About Running Out of Food in the Last Year: Never true    Ran Out of Food in the Last Year: Never true   Transportation Needs: No Transportation Needs    Lack of Transportation (Medical): No    Lack of Transportation (Non-Medical): No   Physical Activity: Inactive    Days of Exercise per Week: 0 days    Minutes of Exercise per Session: 0 min   Stress: Stress Concern Present    Feeling of Stress : Very much   Social Connections: Moderately Isolated    Frequency of Communication with Friends and Family: Three times a week    Frequency of Social Gatherings with Friends and Family: Three times a week    Attends Jew Services: 1 to 4 times per year    Active Member of Clubs or Organizations: No    Attends Club or Organization Meetings: Never    Marital Status:    Housing Stability: Low Risk     Unable to Pay for Housing in the Last Year: No    Number of Places Lived in the Last Year: 1    Unstable Housing in the Last Year: No       Past Surgical History:   Procedure Laterality Date    ABDOMINAL SURGERY      CARDIAC SURGERY      3 STENTS     CAUDAL EPIDURAL STEROID INJECTION N/A 6/22/2020    Procedure: INJECTION, STEROID, SPINE, EPIDURAL, CAUDAL;  Surgeon: Evangelist Bose MD;  Location: [facility] OR;  Service: Pain Management;  Laterality: N/A;    COLONOSCOPY  02/2011    diverticulosis with diverticula with clot, no active bleeding    COLONOSCOPY N/A 8/24/2016    Procedure: COLONOSCOPY;  Surgeon: Saroj Borjas MD;  Location: [facility];  Service: Endoscopy;  Laterality: N/A;    COLONOSCOPY N/A 11/18/2020    Procedure: COLONOSCOPY;  Surgeon: Saroj Borjas MD;  Location: [facility];  Service: Endoscopy;   Laterality: N/A;    COLONOSCOPY N/A 10/27/2021    Procedure: COLONOSCOPY;  Surgeon: Sarjo Borjas MD;  Location: [facility] ENDO;  Service: Endoscopy;  Laterality: N/A;    CREATION, COLOSTOMY, LAPAROSCOPIC N/A 4/25/2023    Procedure: CREATION, COLOSTOMY, LAPAROSCOPIC;  Surgeon: Mark Martinez Jr., MD;  Location: [facility] OR;  Service: General;  Laterality: N/A;    DEBRIDEMENT OF SACRAL WOUND N/A 4/20/2023    Procedure: DEBRIDEMENT, WOUND, SACRUM;  Surgeon: Mark Martinez Jr., MD;  Location: [facility] OR;  Service: General;  Laterality: N/A;    EPIDURAL STEROID INJECTION INTO LUMBAR SPINE N/A 6/22/2020    Procedure: Injection-steroid-epidural-lumbar;  Surgeon: Evangelist Bose MD;  Location: [facility];  Service: Pain Management;  Laterality: N/A;  L3 L4 L5 S1    EPIDURAL STEROID INJECTION INTO LUMBAR SPINE N/A 9/21/2020    Procedure: Injection-steroid-epidural-lumbar;  Surgeon: Evangelist Bose MD;  Location: [facility];  Service: Pain Management;  Laterality: N/A;  L5-S1    FUSION, SPINE, CERVICAL N/A 3/24/2023    Procedure: FUSION, SPINE, CERVICAL;  Surgeon: Kike Kc DO;  Location: [facility];  Service: Neurosurgery;  Laterality: N/A;  posterior cervical decompression/fusion C3-T1    GASTRIC BYPASS  2/5/2008    IVC FILTER RETRIEVAL      JOINT REPLACEMENT  1996 and 2001    bi-lat hip replacement/Rt Hip and Lt Hip    RADIOFREQUENCY ABLATION OF LUMBAR MEDIAL BRANCH NERVE AT SINGLE LEVEL Bilateral 1/10/2020    Procedure: Radiofrequency Ablation, Nerve, Spinal, Lumbar, Medial Branch, 1 Level;  Surgeon: Evangelist Bose MD;  Location: [facility] OR;  Service: Pain Management;  Laterality: Bilateral;  L3,L4,L5    RADIOFREQUENCY ABLATION OF LUMBAR MEDIAL BRANCH NERVE AT SINGLE LEVEL Bilateral 11/23/2021    Procedure: Radiofrequency Ablation, Nerve, Spinal, Lumbar, Medial Branch, Bilateral L 3,4,5;  Surgeon: Nile Causey MD;  Location: [facility];  Service: Pain Management;  Laterality: Bilateral;    ROTATOR CUFF REPAIR      Rt  shoulder    Stents  8/18/2010    x 3    UPPER GASTROINTESTINAL ENDOSCOPY  02/2011       Family History   Problem Relation Age of Onset    Heart attack Mother     Heart attack Father     Heart attack Brother     Ulcerative colitis Daughter 35    Lupus Daughter     Colon cancer Neg Hx     Colon polyps Neg Hx     Crohn's disease Neg Hx     Melanoma Neg Hx     Psoriasis Neg Hx     Eczema Neg Hx        Review of patient's allergies indicates:   Allergen Reactions    Donepezil Other (See Comments)     AMS    Bactroban [mupirocin calcium] Blisters     Causes Blisters    Shellfish containing products Other (See Comments)     Other reaction(s): Gout  OYSTERS       Current Facility-Administered Medications on File Prior to Encounter   Medication Dose Route Frequency Provider Last Rate Last Admin    [MAR Hold - Suspended Admission] allopurinoL tablet 100 mg  100 mg Oral QHS Valentin Styles MD   100 mg at 06/04/23 2129    [MAR Hold - Suspended Admission] aluminum-magnesium hydroxide-simethicone 200-200-20 mg/5 mL suspension 30 mL  30 mL Oral QID PRN Valentin Styles MD        [MAR Hold - Suspended Admission] amiodarone tablet 200 mg  200 mg Oral BID Valentin Styles MD   200 mg at 06/05/23 0907    [MAR Hold - Suspended Admission] ascorbic acid (vitamin C) tablet 500 mg  500 mg Oral Daily Valentin Styles MD   500 mg at 06/05/23 0907    [MAR Hold - Suspended Admission] aspirin EC tablet 81 mg  81 mg Oral Daily Valentin Styles MD   81 mg at 06/05/23 0907    [MAR Hold - Suspended Admission] atorvastatin tablet 80 mg  80 mg Oral QHS Valentin Styles MD   80 mg at 06/04/23 2129    [MAR Hold - Suspended Admission] bisacodyL suppository 10 mg  10 mg Rectal Daily PRN Valentin Styles MD        [MAR Hold - Suspended Admission] collagenase ointment   Topical (Top) Every Tues, Fri Valentin Styles MD   Given at 06/02/23 1729    [MAR Hold - Suspended Admission] collagenase ointment   Topical (Top) Daily Valentin Styles MD   Given at  06/05/23 0900    [MAR Hold - Suspended Admission] famotidine tablet 40 mg  40 mg Oral Daily Valentin Styles MD   40 mg at 06/05/23 0906    [MAR Hold - Suspended Admission] furosemide tablet 20 mg  20 mg Oral Daily Valentin Styles MD   20 mg at 06/05/23 0907    [MAR Hold - Suspended Admission] glucagon (human recombinant) injection 1 mg  1 mg Intramuscular PRDONNA Styles MD        [MAR Hold - Suspended Admission] glucose chewable tablet 16 g  16 g Oral PRN Valentin Styles MD        [MAR Hold - Suspended Admission] glucose chewable tablet 24 g  24 g Oral PRDONNA Styles MD        [MAR Hold - Suspended Admission] guaiFENesin 12 hr tablet 600 mg  600 mg Oral BID Valentin Styles MD   600 mg at 06/05/23 0907    [MAR Hold - Suspended Admission] HYDROcodone-acetaminophen 5-325 mg per tablet 1 tablet  1 tablet Oral Q6H PRDONNA Styles MD   1 tablet at 06/04/23 0904    [MAR Hold - Suspended Admission] insulin aspart U-100 pen 1-10 Units  1-10 Units Subcutaneous QID (AC + HS) PRDONNA Styles MD        [MAR Hold - Suspended Admission] LIDOcaine HCL 4% external solution 4 mL  4 mL Topical (Top) PRDONNA Styles MD        [MAR Hold - Suspended Admission] melatonin tablet 6 mg  6 mg Oral Nightly PRN Valentin Styles MD   6 mg at 06/03/23 2043    [MAR Hold - Suspended Admission] multivitamin tablet  1 tablet Oral Daily Valentin Styles MD   1 tablet at 06/05/23 0907    [MAR Hold - Suspended Admission] naloxone 0.4 mg/mL injection 0.02 mg  0.02 mg Intravenous PRDONNA Styles MD        [MAR Hold - Suspended Admission] ondansetron disintegrating tablet 8 mg  8 mg Oral Q8H PRDONNA Styles MD        [MAR Hold - Suspended Admission] potassium chloride CR tablet 10 mEq  10 mEq Oral Daily Valentin Styles MD   10 mEq at 06/05/23 0907    [MAR Hold - Suspended Admission] simethicone chewable tablet 80 mg  1 tablet Oral QID PRDONNA Styles MD        [MAR Hold - Suspended Admission] sodium chloride  0.9% flush 10 mL  10 mL Intravenous Q12H PRN Valentin Styles MD        [MAR Hold - Suspended Admission] sodium chloride 0.9% flush 10 mL  10 mL Intravenous Q6H Valentin Styles MD   10 mL at 06/05/23 1206    And    [MAR Hold - Suspended Admission] sodium chloride 0.9% flush 10 mL  10 mL Intravenous PRN Valentin Styles MD        [MAR Hold - Suspended Admission] traZODone tablet 50 mg  50 mg Oral QHS Valentin Styles MD   50 mg at 06/04/23 2129    [MAR Hold - Suspended Admission] vitamin D 1000 units tablet 1,000 Units  1,000 Units Oral Daily Valentin Styles MD   1,000 Units at 06/05/23 0907    [MAR Hold - Suspended Admission] zinc sulfate capsule 220 mg  220 mg Oral Daily Valentin Styles MD   220 mg at 06/05/23 0906     Current Outpatient Medications on File Prior to Encounter   Medication Sig Dispense Refill    aspirin (ECOTRIN LOW STRENGTH) 81 MG EC tablet Take 1 tablet by mouth.      allopurinoL (ZYLOPRIM) 100 MG tablet Take 1 tablet (100 mg total) by mouth every evening. 90 tablet 3    amiodarone (PACERONE) 200 MG Tab Take 1 tablet (200 mg total) by mouth 2 (two) times daily. 60 tablet 11    atorvastatin (LIPITOR) 80 MG tablet Take 1 tablet (80 mg total) by mouth once daily. 90 tablet 3    calcitRIOL (ROCALTROL) 0.5 MCG Cap Take 1 capsule (0.5 mcg total) by mouth once daily. 30 capsule 4    ceftriaxone sodium (CEFTRIAXONE 2 G  ML D5W) 2 g/100 mL IVPB Inject 100 mLs (2 g total) into the vein once daily.      ciclopirox (PENLAC) 8 % Soln Apply 1 application topically nightly.      clopidogreL (PLAVIX) 75 mg tablet Take 1 tablet by mouth once daily.      famotidine (PEPCID) 40 MG tablet Take 1 tablet (40 mg total) by mouth once daily. 90 tablet 3    ferrous sulfate (FEROSUL) 325 mg (65 mg iron) Tab tablet TAKE 1 TABLET BY MOUTH TWICE DAILY 180 tablet 3    fluticasone propionate (FLONASE) 50 mcg/actuation nasal spray 1 spray by Each Nostril route once daily.      insulin aspart U-100 (NOVOLOG) 100 unit/mL  "(3 mL) InPn pen Inject into the skin 3 (three) times daily with meals.      lisinopriL (PRINIVIL,ZESTRIL) 40 MG tablet Take 40 mg by mouth once daily.      mecobal-levomefolat Ca-B6 phos (FOLTANX) 3-35-2 mg Tab Take 1 tablet by mouth once daily.      metronidazole (FLAGYL) 500 mg/100 mL IVPB Inject 100 mLs (500 mg total) into the vein every 8 (eight) hours. 36328 mL 0    mirabegron (MYRBETRIQ) 50 mg Tb24 Take 1 tablet by mouth once daily.      nitroGLYCERIN 0.4 MG/DOSE TL SPRY (NITROLINGUAL) 400 mcg/spray spray Place 1 spray under the tongue every 5 (five) minutes as needed.      omeprazole (PRILOSEC) 20 MG capsule Take 20 mg by mouth once daily.      traZODone (DESYREL) 50 MG tablet Take 1 tablet by mouth every evening.      triamcinolone acetonide 0.1% (KENALOG) 0.1 % cream APPLY TO THE AFFECTED AREA(S) TWICE DAILY (Patient taking differently: Apply 1 g topically 2 (two) times daily. APPLY TO THE AFFECTED AREA(S) TWICE DAILY) 80 g 3    warfarin (COUMADIN) 5 MG tablet Take 1 tablet (5 mg total) by mouth Daily. 90 tablet 0       ROS: As per HPI and below:  Pertinent items are noted in HPI.      Physical Exam:     Vitals:    23 1146 23 1639 23 2019 23 2047   BP: (!) 152/84 (!) 147/58 125/74    BP Location:       Pulse:  69 76    Resp: 18 18 18    Temp: 97.3 °F (36.3 °C) 97.5 °F (36.4 °C) 98.1 °F (36.7 °C)    TempSrc: Oral Oral Axillary    SpO2: (!) 92% 97% 96% 98%   Weight:       Height:           BP  Min: 90/47  Max: 160/63  Temp  Av.9 °F (36.6 °C)  Min: 96.7 °F (35.9 °C)  Max: 99.2 °F (37.3 °C)  Pulse  Av.5  Min: 60  Max: 200  Resp  Av.8  Min: 8  Max: 29  SpO2  Av.2 %  Min: 77 %  Max: 100 %  Height  Av' 8" (172.7 cm)  Min: 5' 8" (172.7 cm)  Max: 5' 8" (172.7 cm)  Weight  Av.2 kg (240 lb 11.9 oz)  Min: 100.2 kg (220 lb 14.4 oz)  Max: 120.7 kg (266 lb)    Body mass index is 34.43 kg/m².          General:             Well developed, well nourished, no apparent " distress  HEENT:              NCAT, no JVD, mucous membranes moist, EOM intact  Cardiovascular:  Regular rate and rhythm, normal S1, normal S2, No murmurs, rubs, or gallops  Respiratory:        Normal breath sounds, no wheezes, no rales, no rhonchi  Abdomen:           Bowel sounds present, non tender, non distended, no masses, no hepatojugular reflux  Extremities:        No clubbing, no cyanosis, no edema  Neurological:      No focal deficits  Skin:                   Multiple wounds as follows:  Right great toe DM ulcer  Right heel unstagable pressure ulcer  Left heel DTI  Unstagable PU of the scrotum  Stage 4 sacral pressure ulcer  Right posterior thigh stage 2 pressure ulcer  Multiple bruising of the BL legs and arms  Left arm skin tear  Open wound of the penis(from mcdaniels)                                                                                        Lab Results   Component Value Date    WBC 9.08 06/06/2023    HGB 7.1 (L) 06/06/2023    HCT 21.0 (L) 06/06/2023    MCV 97 06/06/2023     06/06/2023     Lab Results   Component Value Date    CHOL 112 03/10/2021    CHOL 114 08/16/2019    CHOL 113 (L) 08/13/2018     Lab Results   Component Value Date    HDL 49 03/10/2021    HDL 41 08/16/2019    HDL 42 08/13/2018     Lab Results   Component Value Date    LDLCALC 44 03/10/2021    LDLCALC 51 08/16/2019    LDLCALC 50.8 (L) 08/13/2018     Lab Results   Component Value Date    TRIG 96 03/10/2021    TRIG 111 08/16/2019    TRIG 101 08/13/2018     Lab Results   Component Value Date    CHOLHDL 43.8 03/10/2021    CHOLHDL 36.0 08/16/2019    CHOLHDL 37.2 08/13/2018     CMP  Recent Labs   Lab 06/06/23  0449   GLU 81   CALCIUM 8.6*   ALBUMIN 1.9*   PROT 5.0*   *   K 3.7   CO2 28      BUN 37*   CREATININE 1.4   ALKPHOS 218*   ALT 36   AST 34   BILITOT 0.5      Lab Results   Component Value Date    TSH 1.009 03/21/2023             Assessment and Recommendations       Diagnoses:    Right great toe DM ulcer  Right  heel unstagable pressure ulcer  Left heel DTI  Unstagable PU of the scrotum  Stage 4 sacral pressure ulcer  Right posterior thigh stage 2 pressure ulcer  Multiple bruising of the BL legs and arms  Left arm skin tear  Open wound of the penis(from mcdaniels)    Plan:  1. Medihoney to the right great toe, left arm skin tear  2. Dakins wet to dry to the sacrum  3. Triad to the skin tears and penis  4. Culture the sacrum  5. Culture the scrotum wound  6. Triad to the scrotum    Complexity:    high       Time spent preparing to see the patient, reviewing the chart, evaluating the patient, direct patient care, educating the family, coordinating care, ordering tests and discussing procedure with the family was 90 minutes

## 2023-06-07 NOTE — CARE UPDATE
06/07/23 0831   Patient Assessment/Suction   Level of Consciousness (AVPU) alert   All Lung Fields Breath Sounds diminished   Skin Integrity   $ Wound Care Tech Time 15 min   Area Observed Bridge of nose   Skin Appearance without discoloration   PRE-TX-O2   Device (Oxygen Therapy) room air   SpO2 95 %   Pulse Oximetry Type Intermittent   $ Pulse Oximetry - Multiple Charge Pulse Oximetry - Multiple   Pulse 76   Resp 18   Aerosol Therapy   $ Aerosol Therapy Charges PRN treatment not required   Preset CPAP/BiPAP Settings   Mode Of Delivery Standby

## 2023-06-07 NOTE — PT/OT/SLP EVAL
Occupational Therapy   Evaluation    Name: Richard Bustos  MRN: [id number]  Admitting Diagnosis: Hematuria  Recent Surgery: * No surgery found *      Recommendations:     Discharge Recommendations: other (see comments) (Return to LTAC)  Discharge Equipment Recommendations:     Barriers to discharge:  Inaccessible home environment, Decreased caregiver support    Assessment:     Ricahrd Bustos is a 77 y.o. male with a medical diagnosis of Hematuria.  He presents with c/o LUE pain at rest and especially with mobility. Patient stated he suffered a fall ~8 months ago, sustaining R fibular fx and severely injuring his LUE. Patient's injuries to LUE prevents functional participation in that UE. However, patient is still able to demonstrate functional use of RUE. Patient also displayed decreased R hand dexterity which patient reported was a result of the aforementioned fall. Patient educated on the use of assistive devices to increased independence with oral hygiene and feeding tasks.  Performance deficits affecting function: weakness, impaired endurance, impaired functional mobility, gait instability, impaired balance, impaired self care skills, impaired sensation, decreased lower extremity function, decreased upper extremity function, decreased ROM, impaired skin, impaired fine motor, impaired coordination.      Rehab Prognosis: Fair; patient would benefit from acute skilled OT services to address these deficits and reach maximum level of function.       Plan:     Patient to be seen 3 x/week to address the above listed problems via self-care/home management, therapeutic activities, therapeutic exercises, wheelchair management/training, sensory integration, neuromuscular re-education  Plan of Care Expires: 07/05/23  Plan of Care Reviewed with: patient    Subjective     Chief Complaint: L shoulder pain  Patient/Family Comments/goals: none    Occupational Profile:  Living Environment: Patient transferred from LTAC.   Previous  level of function: Patient required assistance with dressing and bathing. Patient was non-ambulatory.  Equipment Used at Home: cane, quad, rollator, BIPAP  Assistance upon Discharge: Assist from LTAC staff    Pain/Comfort:  Pain Rating 1:  (not rated)  Location - Side 1: Left  Location - Orientation 1: generalized  Location 1: shoulder  Pain Addressed 1: Reposition, Distraction, Nurse notified  Pain Rating Post-Intervention 1:  (not rated)    Patients cultural, spiritual, Methodist conflicts given the current situation: no    Objective:     Communicated with: nurse Lr prior to session.  Patient found HOB elevated with PICC line, mcdaniels catheter, colostomy upon OT entry to room.    General Precautions: Standard, fall  Orthopedic Precautions: N/A  Braces: N/A  Respiratory Status: Room air    Occupational Performance:    Bed Mobility:    Total A x 2 with all bed mobility    Functional Mobility/Transfers:  Not performed due to non-ambulatory status    Activities of Daily Living:  Grooming: minimum assistance with supporting RUE to hold basin and cup of water to mouth during oral hygiene task.   Lower Body Dressing: dependence with don/doffing socks while supine in bed  Toileting: dependence with mcdaniels and colostomy in place    Cognitive/Visual Perceptual:  Cognitive/Psychosocial Skills:     -       Oriented to: x4   -       Follows Commands/attention:Follows multistep  commands  -       Communication: clear/fluent  -       Safety awareness/insight to disability: intact   -       Mood/Affect/Coping skills/emotional control: Appropriate to situation, Cooperative, and Pleasant  Visual/Perceptual:      -Intact (with glasses far distance vision)    Physical Exam:  Skin integrity: Wound multiple decubiti on scrotum, sacrum, and bilateral knee/heels  Upper Extremity Range of Motion:     -       Right Upper Extremity: 90 degrees of elbow flex; WFL distally  -       Left Upper Extremity: Significant limitations at all  joints due to severe injury from a past fall  Upper Extremity Strength:    -       Right Upper Extremity: 3-/5 at elbow; 3+/5 distally  -       Left Upper Extremity: Unable to test due to pain and rigidity   Strength:    -       Right Upper Extremity: 3+/5  -       Left Upper Extremity: 2+/5  Fine Motor Coordination:    -       Impaired  due to L hand weakness  Gross motor coordination:   Impaired in UE's due to immobility in LUE    AMPAC 6 Click ADL:  AMPA Total Score: 10    Treatment & Education:  OT ed pt on OT role & POC as well as discharge recommendations.  OT educated patient on use of adaptive devices to fit around eating utensils and hygiene items for increased ADL independence.    Patient left HOB elevated with all lines intact, call button in reach, and bed alarm on    GOALS:   Multidisciplinary Problems       Occupational Therapy Goals          Problem: Occupational Therapy    Goal Priority Disciplines Outcome Interventions   Occupational Therapy Goal     OT, PT/OT Ongoing, Progressing    Description: Goals to be met by: 7/5/2023     Patient will increase functional independence with ADLs by performing:    Feeding with Supervision while seated upright in bed.  Grooming while in bed with Supervision.  Supine to sit with Moderate Assistance.  Bilateral Upper extremity exercise program, with supervision.                         History:     Past Medical History:   Diagnosis Date    Anemia     Anemia of other chronic disease 09/13/2017    Anemia, chronic renal failure 09/13/2017    Anemia, unspecified 09/13/2017    Anticoagulant long-term use     Aorta aneurysm     Arthritis     Atrial fibrillation     Bacteremia due to Streptococcus 01/08/2022    CAD (coronary artery disease)     CHF (congestive heart failure)     Chronic kidney disease     Clotting disorder 09/13/2017    Colon polyp     Dementia     Diabetes mellitus     not on meds    Diabetes mellitus type II     Diverticulosis     Elevated PSA      Encounter for blood transfusion     Former smoker     General anesthetics causing adverse effect in therapeutic use     TROUBLE COMING OUT OF ANESTHESIA WITH GASTRIC BYPASS    GERD (gastroesophageal reflux disease)     Gout     Hx of colonic polyps     Hypercoagulable state     Hyperlipidemia     Hypertension     MI, old 2010    MTHFR mutation     Myocardial infarction     9/2010    Osteomyelitis of ankle or foot 03/09/2017    Prostate cancer 06/2016    Sleep apnea     Squamous cell carcinoma 01/23/2018    Left helix, imiquimod    Supratherapeutic INR 4/19/2023    Venous stasis     Chronic         Past Surgical History:   Procedure Laterality Date    ABDOMINAL SURGERY      CARDIAC SURGERY      3 STENTS     CAUDAL EPIDURAL STEROID INJECTION N/A 6/22/2020    Procedure: INJECTION, STEROID, SPINE, EPIDURAL, CAUDAL;  Surgeon: Evangelist Bose MD;  [facility]: [facility];  Service: Pain Management;  Laterality: N/A;    COLONOSCOPY  02/2011    diverticulosis with diverticula with clot, no active bleeding    COLONOSCOPY N/A 8/24/2016    Procedure: COLONOSCOPY;  Surgeon: Saroj Borjas MD;  Location: [facility];  Service: Endoscopy;  Laterality: N/A;    COLONOSCOPY N/A 11/18/2020    Procedure: COLONOSCOPY;  Surgeon: Saroj Borjas MD;  Location: [facility];  Service: Endoscopy;  Laterality: N/A;    COLONOSCOPY N/A 10/27/2021    Procedure: COLONOSCOPY;  Surgeon: Saroj Borjas MD;  Location: [facility];  Service: Endoscopy;  Laterality: N/A;    CREATION, COLOSTOMY, LAPAROSCOPIC N/A 4/25/2023    Procedure: CREATION, COLOSTOMY, LAPAROSCOPIC;  Surgeon: Mark Martinez Jr., MD;  Location: [facility] OR;  Service: General;  Laterality: N/A;    DEBRIDEMENT OF SACRAL WOUND N/A 4/20/2023    Procedure: DEBRIDEMENT, WOUND, SACRUM;  Surgeon: Mark Martinez Jr., MD;  Location: [facility] OR;  Service: General;  Laterality: N/A;    EPIDURAL STEROID INJECTION INTO LUMBAR SPINE N/A 6/22/2020    Procedure: Injection-steroid-epidural-lumbar;   Surgeon: Evangelist Bose MD;  Location: [facility] OR;  Service: Pain Management;  Laterality: N/A;  L3 L4 L5 S1    EPIDURAL STEROID INJECTION INTO LUMBAR SPINE N/A 9/21/2020    Procedure: Injection-steroid-epidural-lumbar;  Surgeon: Evangelist Bose MD;  Location: [facility] OR;  Service: Pain Management;  Laterality: N/A;  L5-S1    FUSION, SPINE, CERVICAL N/A 3/24/2023    Procedure: FUSION, SPINE, CERVICAL;  Surgeon: Kike Kc DO;  Location: [facility] OR;  Service: Neurosurgery;  Laterality: N/A;  posterior cervical decompression/fusion C3-T1    GASTRIC BYPASS  2/5/2008    IVC FILTER RETRIEVAL      JOINT REPLACEMENT  1996 and 2001    bi-lat hip replacement/Rt Hip and Lt Hip    RADIOFREQUENCY ABLATION OF LUMBAR MEDIAL BRANCH NERVE AT SINGLE LEVEL Bilateral 1/10/2020    Procedure: Radiofrequency Ablation, Nerve, Spinal, Lumbar, Medial Branch, 1 Level;  Surgeon: Evangelist Bose MD;  Location: [facility] OR;  Service: Pain Management;  Laterality: Bilateral;  L3,L4,L5    RADIOFREQUENCY ABLATION OF LUMBAR MEDIAL BRANCH NERVE AT SINGLE LEVEL Bilateral 11/23/2021    Procedure: Radiofrequency Ablation, Nerve, Spinal, Lumbar, Medial Branch, Bilateral L 3,4,5;  Surgeon: Nile Causey MD;  Location: [facility] OR;  Service: Pain Management;  Laterality: Bilateral;    ROTATOR CUFF REPAIR      Rt shoulder    Stents  8/18/2010    x 3    UPPER GASTROINTESTINAL ENDOSCOPY  02/2011       Time Tracking:     OT Date of Treatment: 06/07/23  OT Start Time: 0918  OT Stop Time: 0952  OT Total Time (min): 34 min    Billable Minutes:Evaluation 10  Self Care/Home Management 24    6/7/2023

## 2023-06-08 PROBLEM — B49 FUNGEMIA: Status: ACTIVE | Noted: 2023-06-08

## 2023-06-08 PROBLEM — B37.7 CANDIDEMIA: Status: ACTIVE | Noted: 2023-06-08

## 2023-06-08 LAB
ALBUMIN SERPL BCP-MCNC: 2 G/DL (ref 3.5–5.2)
ALP SERPL-CCNC: 218 U/L (ref 55–135)
ALT SERPL W/O P-5'-P-CCNC: 36 U/L (ref 10–44)
ANION GAP SERPL CALC-SCNC: 8 MMOL/L (ref 8–16)
AST SERPL-CCNC: 42 U/L (ref 10–40)
BACTERIA SPEC AEROBE CULT: ABNORMAL
BACTERIA SPEC ANAEROBE CULT: NORMAL
BACTERIA UR CULT: NO GROWTH
BASOPHILS # BLD AUTO: 0.04 K/UL (ref 0–0.2)
BASOPHILS NFR BLD: 0.4 % (ref 0–1.9)
BILIRUB SERPL-MCNC: 0.7 MG/DL (ref 0.1–1)
BUN SERPL-MCNC: 35 MG/DL (ref 8–23)
CALCIUM SERPL-MCNC: 9 MG/DL (ref 8.7–10.5)
CHLORIDE SERPL-SCNC: 105 MMOL/L (ref 95–110)
CO2 SERPL-SCNC: 28 MMOL/L (ref 23–29)
CREAT SERPL-MCNC: 1.3 MG/DL (ref 0.5–1.4)
DIFFERENTIAL METHOD: ABNORMAL
EOSINOPHIL # BLD AUTO: 0.1 K/UL (ref 0–0.5)
EOSINOPHIL NFR BLD: 1.4 % (ref 0–8)
ERYTHROCYTE [DISTWIDTH] IN BLOOD BY AUTOMATED COUNT: 16.8 % (ref 11.5–14.5)
EST. GFR  (NO RACE VARIABLE): 57.3 ML/MIN/1.73 M^2
GLUCOSE SERPL-MCNC: 102 MG/DL (ref 70–110)
GLUCOSE SERPL-MCNC: 104 MG/DL (ref 70–110)
GLUCOSE SERPL-MCNC: 173 MG/DL (ref 70–110)
GLUCOSE SERPL-MCNC: 94 MG/DL (ref 70–110)
HCT VFR BLD AUTO: 23 % (ref 40–54)
HGB BLD-MCNC: 7.8 G/DL (ref 14–18)
IMM GRANULOCYTES # BLD AUTO: 0.09 K/UL (ref 0–0.04)
IMM GRANULOCYTES NFR BLD AUTO: 1 % (ref 0–0.5)
INR PPP: 2 (ref 0.8–1.2)
LYMPHOCYTES # BLD AUTO: 0.9 K/UL (ref 1–4.8)
LYMPHOCYTES NFR BLD: 9.9 % (ref 18–48)
MAGNESIUM SERPL-MCNC: 1.8 MG/DL (ref 1.6–2.6)
MCH RBC QN AUTO: 33.2 PG (ref 27–31)
MCHC RBC AUTO-ENTMCNC: 33.9 G/DL (ref 32–36)
MCV RBC AUTO: 98 FL (ref 82–98)
MONOCYTES # BLD AUTO: 0.6 K/UL (ref 0.3–1)
MONOCYTES NFR BLD: 6.8 % (ref 4–15)
NEUTROPHILS # BLD AUTO: 7.3 K/UL (ref 1.8–7.7)
NEUTROPHILS NFR BLD: 80.5 % (ref 38–73)
NRBC BLD-RTO: 0 /100 WBC
PLATELET # BLD AUTO: 363 K/UL (ref 150–450)
PMV BLD AUTO: 9.8 FL (ref 9.2–12.9)
POTASSIUM SERPL-SCNC: 4.4 MMOL/L (ref 3.5–5.1)
PROT SERPL-MCNC: 5.6 G/DL (ref 6–8.4)
PROTHROMBIN TIME: 21.3 SEC (ref 9–12.5)
RBC # BLD AUTO: 2.35 M/UL (ref 4.6–6.2)
SODIUM SERPL-SCNC: 141 MMOL/L (ref 136–145)
WBC # BLD AUTO: 9.01 K/UL (ref 3.9–12.7)

## 2023-06-08 PROCEDURE — 99900035 HC TECH TIME PER 15 MIN (STAT)

## 2023-06-08 PROCEDURE — 94799 UNLISTED PULMONARY SVC/PX: CPT

## 2023-06-08 PROCEDURE — 25000003 PHARM REV CODE 250: Performed by: INTERNAL MEDICINE

## 2023-06-08 PROCEDURE — 80053 COMPREHEN METABOLIC PANEL: CPT | Performed by: FAMILY MEDICINE

## 2023-06-08 PROCEDURE — 63600175 PHARM REV CODE 636 W HCPCS: Performed by: INTERNAL MEDICINE

## 2023-06-08 PROCEDURE — 94660 CPAP INITIATION&MGMT: CPT

## 2023-06-08 PROCEDURE — 99232 PR SUBSEQUENT HOSPITAL CARE,LEVL II: ICD-10-PCS | Mod: ,,, | Performed by: INTERNAL MEDICINE

## 2023-06-08 PROCEDURE — 36415 COLL VENOUS BLD VENIPUNCTURE: CPT | Performed by: FAMILY MEDICINE

## 2023-06-08 PROCEDURE — 97110 THERAPEUTIC EXERCISES: CPT

## 2023-06-08 PROCEDURE — 99900031 HC PATIENT EDUCATION (STAT)

## 2023-06-08 PROCEDURE — 94761 N-INVAS EAR/PLS OXIMETRY MLT: CPT

## 2023-06-08 PROCEDURE — 25000003 PHARM REV CODE 250: Performed by: FAMILY MEDICINE

## 2023-06-08 PROCEDURE — 27000221 HC OXYGEN, UP TO 24 HOURS

## 2023-06-08 PROCEDURE — 85025 COMPLETE CBC W/AUTO DIFF WBC: CPT | Performed by: FAMILY MEDICINE

## 2023-06-08 PROCEDURE — 83735 ASSAY OF MAGNESIUM: CPT | Performed by: FAMILY MEDICINE

## 2023-06-08 PROCEDURE — 99232 SBSQ HOSP IP/OBS MODERATE 35: CPT | Mod: ,,, | Performed by: INTERNAL MEDICINE

## 2023-06-08 PROCEDURE — 85610 PROTHROMBIN TIME: CPT | Performed by: FAMILY MEDICINE

## 2023-06-08 PROCEDURE — 12000002 HC ACUTE/MED SURGE SEMI-PRIVATE ROOM

## 2023-06-08 PROCEDURE — 94760 N-INVAS EAR/PLS OXIMETRY 1: CPT

## 2023-06-08 PROCEDURE — 87040 BLOOD CULTURE FOR BACTERIA: CPT | Performed by: INTERNAL MEDICINE

## 2023-06-08 PROCEDURE — 99232 PR SUBSEQUENT HOSPITAL CARE,LEVL II: ICD-10-PCS | Mod: 24,,, | Performed by: STUDENT IN AN ORGANIZED HEALTH CARE EDUCATION/TRAINING PROGRAM

## 2023-06-08 PROCEDURE — 99232 SBSQ HOSP IP/OBS MODERATE 35: CPT | Mod: 24,,, | Performed by: STUDENT IN AN ORGANIZED HEALTH CARE EDUCATION/TRAINING PROGRAM

## 2023-06-08 RX ORDER — WARFARIN 1 MG/1
4 TABLET ORAL DAILY
Status: DISCONTINUED | OUTPATIENT
Start: 2023-06-08 | End: 2023-06-12 | Stop reason: HOSPADM

## 2023-06-08 RX ADMIN — PIPERACILLIN AND TAZOBACTAM 3.38 G: 3; .375 INJECTION, POWDER, LYOPHILIZED, FOR SOLUTION INTRAVENOUS; PARENTERAL at 08:06

## 2023-06-08 RX ADMIN — AMIODARONE HYDROCHLORIDE 200 MG: 200 TABLET ORAL at 08:06

## 2023-06-08 RX ADMIN — Medication 6 MG: at 08:06

## 2023-06-08 RX ADMIN — HYDROCODONE BITARTRATE AND ACETAMINOPHEN 1 TABLET: 5; 325 TABLET ORAL at 08:06

## 2023-06-08 RX ADMIN — PIPERACILLIN AND TAZOBACTAM 3.38 G: 3; .375 INJECTION, POWDER, LYOPHILIZED, FOR SOLUTION INTRAVENOUS; PARENTERAL at 01:06

## 2023-06-08 RX ADMIN — PANTOPRAZOLE SODIUM 40 MG: 40 TABLET, DELAYED RELEASE ORAL at 05:06

## 2023-06-08 RX ADMIN — GUAIFENESIN 600 MG: 600 TABLET, EXTENDED RELEASE ORAL at 08:06

## 2023-06-08 RX ADMIN — ATORVASTATIN CALCIUM 20 MG: 20 TABLET, FILM COATED ORAL at 08:06

## 2023-06-08 RX ADMIN — WHITE PETROLATUM 41 % TOPICAL OINTMENT: at 08:06

## 2023-06-08 RX ADMIN — ALLOPURINOL 100 MG: 100 TABLET ORAL at 08:06

## 2023-06-08 RX ADMIN — FUROSEMIDE 20 MG: 20 INJECTION, SOLUTION INTRAMUSCULAR; INTRAVENOUS at 05:06

## 2023-06-08 NOTE — PLAN OF CARE
06/08/23 1419   Post-Acute Status   Post-Acute Authorization Placement   Post-Acute Placement Status Pending Bed Availability     CM informed no bed available at [facility], will be able to admit on 6/9/2023

## 2023-06-08 NOTE — SUBJECTIVE & OBJECTIVE
Interval History:   As per subjective     Review of Systems  Objective:     Vital Signs (Most Recent):  Temp: 97.7 °F (36.5 °C) (06/08/23 1120)  Pulse: 86 (06/08/23 1120)  Resp: 16 (06/08/23 1120)  BP: (!) 122/43 (06/08/23 1120)  SpO2: (!) 89 % (06/08/23 1120) Vital Signs (24h Range):  Temp:  [97.7 °F (36.5 °C)-98.5 °F (36.9 °C)] 97.7 °F (36.5 °C)  Pulse:  [70-86] 86  Resp:  [16-20] 16  SpO2:  [89 %-98 %] 89 %  BP: (104-136)/(43-72) 122/43     Weight: 102.7 kg (226 lb 6.6 oz)  Body mass index is 34.43 kg/m².    Intake/Output Summary (Last 24 hours) at 6/8/2023 1400  Last data filed at 6/8/2023 0530  Gross per 24 hour   Intake 1880 ml   Output 1900 ml   Net -20 ml         Physical Exam  Constitutional:       General: He is not in acute distress.     Appearance: He is well-developed.   HENT:      Head: Normocephalic.   Eyes:      Pupils: Pupils are equal, round, and reactive to light.   Cardiovascular:      Rate and Rhythm: Normal rate and regular rhythm.   Pulmonary:      Effort: Pulmonary effort is normal. No respiratory distress.   Abdominal:      General: There is no distension.      Tenderness: There is no abdominal tenderness.   Musculoskeletal:      Cervical back: Neck supple.   Skin:     Findings: No rash.   Neurological:      Mental Status: He is alert. He is disoriented.           Significant Labs: All pertinent labs within the past 24 hours have been reviewed.  CBC:   Recent Labs   Lab 06/07/23  0440 06/08/23  0452   WBC 9.97 9.01   HGB 7.5* 7.8*   HCT 22.4* 23.0*    363     CMP:   Recent Labs   Lab 06/07/23  0440 06/08/23  0452    141   K 4.0 4.4    105   CO2 27 28   GLU 91 104   BUN 33* 35*   CREATININE 1.2 1.3   CALCIUM 8.7 9.0   PROT 5.6* 5.6*   ALBUMIN 2.0* 2.0*   BILITOT 0.7 0.7   ALKPHOS 204* 218*   AST 35 42*   ALT 35 36   ANIONGAP 4* 8     Cardiac Markers: No results for input(s): CKMB, MYOGLOBIN, BNP, TROPISTAT in the last 48 hours.    Significant Imaging: I have reviewed all  pertinent imaging results/findings within the past 24 hours.

## 2023-06-08 NOTE — CARE UPDATE
06/08/23 0808   PRE-TX-O2   Device (Oxygen Therapy) room air   Pulse Oximetry Type Intermittent   $ Pulse Oximetry - Single Charge Pulse Oximetry - Single   Aerosol Therapy   $ Aerosol Therapy Charges PRN treatment not required   Respiratory Treatment Status (SVN) PRN treatment not required   Preset CPAP/BiPAP Settings   Mode Of Delivery Standby   $ CPAP/BiPAP Daily Charge BiPAP/CPAP Daily   $ Initial CPAP/BiPAP Setup? No   $ Is patient using? No/refused   Reason patient is not wearing? Patient refused   Equipment Type V60   Education   $ Education BiPAP;15 min

## 2023-06-08 NOTE — NURSING
All wound care completed per orders without any complications. Patient tolerated wound care well.

## 2023-06-08 NOTE — PROGRESS NOTES
"[facility] Medicine  Progress Note    Patient Name: Richard Bustos  MRN: [id number]  Patient Class: IP- Inpatient   Admission Date: 6/5/2023  Length of Stay: 3 days  Attending Physician: Tolu Gasca MD  Primary Care Provider: Familia Ramirez Jr, MD        Subjective:     Principal Problem:Hematuria        HPI:  Richard Bustos is a 77 y.o. White male   With PMH of as noted below,  Recently treated for sacral osteomyelitis,   S/p diverting colostomy,  who presents with worsening sacral wound.     Pt reports he feels "fine"  He denies pain at this time  Pt's wound recently grew morganella, proteus,  Then later pseudomonas (only intermediate sensitivity to cefepime), and enterobacter cloacae (intermediate sensitivity to zosyn)      Overview/Hospital Course:  Seen and examined   Afebrile . Urine clearing in the tube .  INR better  D/w family long time . D/w case Mx . Possible DC today/tomorrow       No new subjective & objective note has been filed under this hospital service since the last note was generated.      Assessment/Plan:      Active Hospital Problems    Diagnosis    *Hematuria    Fungemia    CLAU (iron deficiency anemia)    Pneumoperitoneum of unknown etiology    Closed nondisplaced fracture of lateral malleolus of fibula    Bedbound    Decubitus ulcer, multiple chronic    Anticoagulated on Coumadin    Anemia    HFrEF (heart failure with reduced ejection fraction)    Scrotal injury    Severe protein-calorie malnutrition    Pressure injury of sacral region, stage 4    Complex renal cyst    MCI (mild cognitive impairment)    Paroxysmal atrial fibrillation     EKG stable  Stable on Coreg, no missed doses of anticoagulation. Continue anticoagulation as described below      LV dysfunction, EF 40%     Last Echo 5/2020  Repeat echo stable  Continue lisinopril and Coreg as now  Monitor      CKD (chronic kidney disease) stage 3, GFR 30-59 ml/min, 41    Aortic aneurysm, thoracic, 4.3 cm, 8/2010     " Measurement of aortic root appears stable- has not had CTA chest to monitor.  Will discuss with Dr. Soto and will recommend CT chest to follow      Diabetes mellitus with chronic kidney disease, stage III    MTHFR mutation (methylenetetrahydrofolate reductase)    Sleep apnea, using BiPAP nightly, 1999, last sleep test in 2013    Hypertension associated with diabetes    CAD (coronary artery disease)    GERD (gastroesophageal reflux disease)         Plan:  Micagungin and follow final yeast culture   Contact isolation precautions  Discontinue IV antibiotics as per Infectious Disease and monitoring, gram neg from superficial wound culture   Continue to hold Coumadin and trend INR. May resume tonight is hematuria clear over the day . 4 mg   Continue IV Lasix 20 mg b.i.d. and following volume status  S/p IV iron  Keep U cath and possible DC with U cath   Appear he is on triple therapy at home . Had cardiac stent but will d/w family   Will need ECHO      VTE Risk Mitigation (From admission, onward)           Ordered     warfarin (COUMADIN) tablet 4 mg  Daily         06/08/23 1402     IP VTE HIGH RISK PATIENT  Once         06/05/23 2315     Place sequential compression device  Until discontinued         06/05/23 2315     Reason for No Pharmacological VTE Prophylaxis  Once        Question:  Reasons:  Answer:  Already adequately anticoagulated on oral Anticoagulants    06/05/23 2315                    Discharge Planning   ORACIO: 6/8/2023     Code Status: Full Code   Is the patient medically ready for discharge?:     Reason for patient still in hospital (select all that apply): Treatment  Discharge Plan A: Long-term acute care facility (LTAC)   Discharge Delays:  (Facility unable to take patient today.. will be 6/9)              Tolu Gasca MD  Department of Hospital Medicine   [facility]

## 2023-06-08 NOTE — PROGRESS NOTES
Patient seen and examined.  No significant events.  Patient is tolerating a diet.  Having ostomy output.  Denies abdominal pain.      Vitals are stable   Afebrile  Abdomen soft, nontender    No leukocytosis  Not acidotic    No significant changes from my standpoint.  Incidentally noted bubble of free air on CT scan is nonspecific.  Patient is tolerating p.o. without any abdominal complaints.  Likely that this is just an incidental finding.  Low suspicion for GI perforation at this point in time.  Patient has been stable for 72 hours now    I will sign off.  Please call with questions.

## 2023-06-08 NOTE — PT/OT/SLP PROGRESS
Occupational Therapy   Treatment    Name: Richard Bustos  MRN: [id number]  Admitting Diagnosis:  Hematuria       Recommendations:     Discharge Recommendations: other (see comments) (Return to LTAC)  Discharge Equipment Recommendations:     Barriers to discharge:  Decreased caregiver support    Assessment:     Richard Bustos is a 77 y.o. male with a medical diagnosis of Hematuria.  He presents with severe physical deconditioning. Patient participated in long sitting bed level and BUE/BLE AAROM. Performance deficits affecting function are weakness, impaired endurance, impaired self care skills, impaired functional mobility, gait instability, impaired balance, decreased upper extremity function, decreased safety awareness, impaired cardiopulmonary response to activity.     Rehab Prognosis:  Fair; patient would benefit from acute skilled OT services to address these deficits and reach maximum level of function.       Plan:     Patient to be seen 3 x/week to address the above listed problems via self-care/home management, therapeutic activities, therapeutic exercises  Plan of Care Expires: 07/05/23  Plan of Care Reviewed with: patient, spouse    Subjective     Chief Complaint: Severe physical deconditioning  Patient/Family Comments/goals: improved functional mobility and ADL independence.  Pain/Comfort:  Pain Rating 1: 0/10  Pain Rating Post-Intervention 1: 0/10    Objective:     Communicated with: nurse prior to session.  Patient found HOB elevated with telemetry, PICC line, colostomy, mcdaniels catheter upon OT entry to room.    General Precautions: Standard, contact, fall    Orthopedic Precautions:RLE non weight bearing (Right distal fibular fx (Non Operative))  Braces: N/A  Respiratory Status: Room air     Occupational Performance:     Bed Mobility:    Patient completed Rolling/Turning to Left with  maximal assistance  Patient completed Rolling/Turning to Right with maximal assistance   Performed long sitting bed level x3  trials for 10 seconds each with maximal assistance x2    BUE/BLE Therex:  Performed BUE AAROM x5 reps with minimal to maximal assistance.  Performed BLE AAROM x5 reps with minimal to moderate assistance.    [facility] 6 Click ADL: 8    Treatment & Education:  Will work on unsupported sitting, trunk/neck control in future sessions to prepare for sitting in a wheelchair and performing ADLs in sitting position.    Patient left HOB elevated with all lines intact, call button in reach, bed alarm on, and spouse present    GOALS:   Multidisciplinary Problems       Occupational Therapy Goals          Problem: Occupational Therapy    Goal Priority Disciplines Outcome Interventions   Occupational Therapy Goal     OT, PT/OT Ongoing, Progressing    Description: Goals to be met by: 7/5/2023     Patient will increase functional independence with ADLs by performing:    Feeding with Supervision while seated upright in bed.  Grooming while in bed with Supervision.  Supine to sit with Moderate Assistance.  Bilateral Upper extremity exercise program, with supervision.                         Time Tracking:     OT Date of Treatment: 06/08/23  OT Start Time: 1115  OT Stop Time: 1133  OT Total Time (min): 18 min    Billable Minutes:Therapeutic Exercise 18    OT/MARZENA: OT          6/8/2023

## 2023-06-08 NOTE — PLAN OF CARE
Problem: Infection  Goal: Absence of Infection Signs and Symptoms  Outcome: Met     Problem: Adult Inpatient Plan of Care  Goal: Plan of Care Review  Outcome: Met  Goal: Patient-Specific Goal (Individualized)  Outcome: Met  Goal: Absence of Hospital-Acquired Illness or Injury  Outcome: Met  Goal: Optimal Comfort and Wellbeing  Outcome: Met  Goal: Readiness for Transition of Care  Outcome: Met     Problem: Impaired Wound Healing  Goal: Optimal Wound Healing  Outcome: Met     Problem: Fluid and Electrolyte Imbalance (Acute Kidney Injury/Impairment)  Goal: Fluid and Electrolyte Balance  Outcome: Met     Problem: Oral Intake Inadequate (Acute Kidney Injury/Impairment)  Goal: Optimal Nutrition Intake  Outcome: Met     Problem: Renal Function Impairment (Acute Kidney Injury/Impairment)  Goal: Effective Renal Function  Outcome: Met     Problem: Diabetes Comorbidity  Goal: Blood Glucose Level Within Targeted Range  Outcome: Met     Problem: Skin Injury Risk Increased  Goal: Skin Health and Integrity  Outcome: Met     Problem: Fall Injury Risk  Goal: Absence of Fall and Fall-Related Injury  Outcome: Met

## 2023-06-08 NOTE — PT/OT/SLP PROGRESS
Physical Therapy Treatment    Patient Name:  Richard Bustos   MRN:  [id number]    Recommendations:     Discharge Recommendations: LTACH (long-term acute care hospital)  Discharge Equipment Recommendations: to be determined by next level of care  Barriers to discharge: None    Assessment:     Richard Bustos is a 77 y.o. male admitted with a medical diagnosis of Hematuria.  He presents with the following impairments/functional limitations: weakness, impaired endurance, impaired self care skills, impaired functional mobility, decreased upper extremity function, decreased lower extremity function, decreased safety awareness, impaired skin, orthopedic precautions. Patient is agreeable to participation with PT treatment. He performed 10 reps of BLE therex as tolerated.     Rehab Prognosis: Fair; patient would benefit from acute skilled PT services to address these deficits and reach maximum level of function.    Recent Surgery: * No surgery found *      Plan:     During this hospitalization, patient to be seen 3 x/week to address the identified rehab impairments via therapeutic activities, therapeutic exercises, neuromuscular re-education and progress toward the following goals:    Plan of Care Expires:  07/07/23    Subjective     Chief Complaint: none verbalized  Patient/Family Comments/goals: agrees to therex  Pain/Comfort:  Pain Rating 1: 0/10      Objective:     Communicated with RN prior to session.  Patient found HOB elevated with telemetry, PICC line, colostomy, mcdaniels catheter upon PT entry to room.     General Precautions: Standard, contact, fall  Orthopedic Precautions: RLE non weight bearing (Right distal fibular fx (Non Operative))  Braces: N/A  Respiratory Status: Room air     Functional Mobility:  No functional mobility today      AM-PAC 6 CLICK MOBILITY          Treatment & Education:  Patient performed 10 reps of bilateral LE therapeutic exercise including ankle pumps (left only), straight leg raises, hip  abduction, and heel slides (left only)    Patient left HOB elevated with all lines intact and call button in reach..    GOALS:   Multidisciplinary Problems       Physical Therapy Goals          Problem: Physical Therapy    Goal Priority Disciplines Outcome Goal Variances Interventions   Physical Therapy Goal     PT, PT/OT      Description: Goals to be met by: 23    Patient will increase functional independence with mobility by performin. Supine to sit with Maximum Assistance  2. Rolling to Left and Right with Moderate Assistance.  3. Lower extremity exercise program x20 reps per handout, with assistance as needed.                          Time Tracking:     PT Received On: 23  PT Start Time: 1015     PT Stop Time: 1031  PT Total Time (min): 16 min     Billable Minutes: Therapeutic Exercise 16    Treatment Type: Treatment  PT/PTA: PT     Number of PTA visits since last PT visit: 0     2023

## 2023-06-08 NOTE — PROGRESS NOTES
Consult Note  Infectious Disease    Reason for Consult:  ID judy    HPI: Richard Bustos is a 77 y.o. male known to our service for treatment of stage 4 sacral decubitus, surgically debrided 4/20 with diverting colostomy 4/25 then treated at [facility] through 5/31 then to TCU in same facility. He completed 4+ weeks of broad spectrum antibiotics, adjusted part way through for new tissue cultures with pseudomonas/enterobacter, with history of long term augmentin(6 weeks prior to that admission) for possible osteomyelitis of foot ulcer. He was sent to the ED for concern of Halle's gangrene as scrotal wounds were felt to be worse. He has been CT scanned, seen by wound care and general surgery. Numerous wound photos were reviewed and d/w wound care RN. Wound care MD intends to debride a bit tomorrow and wound vac will be replaced to sacrum.  He submitted superficial swab cultures of the scrotum and sacrum. Additionally he has had gross hematuria the entire time he as at LTAC and SNF, worse with elevated INR,  on coumadin for history of MTHFR mutation, history of prostate cancer, status unknown.     6/7: Interim reviewed.  Afebrile, urine culture negative, so far.  wound cultures have Gram-negative rods as expected.  Ruelas was irrigated and improved in quality.  Urology consult pending.  Hemoglobin is stable at 7.5.  Discussed with his family that he has been in remission from prostate cancer for some time.  They do not know the etiology of the hematuria but comment that he has renal cysts.  He tells me that the catheter was changed today but it actually was changed on the 5th.    6/8: interim reviewed. Blood cultures drawn on 6/5 have grown candida tropicalis and glabrata. Diflucan was begun last night. He has no fever. His picc line is over a month old. His urine culture is negative. He has no new complaints and is at his recent baseline. No change in vision. No peripheral signs of candidemia. Wound cultures notes, but  represent surface colonization.     Review of patient's allergies indicates:   Allergen Reactions    Donepezil Other (See Comments)     AMS    Bactroban [mupirocin calcium] Blisters     Causes Blisters    Shellfish containing products Other (See Comments)     Other reaction(s): Gout  OYSTERS     Past Medical History:   Diagnosis Date    Anemia     Anemia of other chronic disease 09/13/2017    Anemia, chronic renal failure 09/13/2017    Anemia, unspecified 09/13/2017    Anticoagulant long-term use     Aorta aneurysm     Arthritis     Atrial fibrillation     Bacteremia due to Streptococcus 01/08/2022    CAD (coronary artery disease)     CHF (congestive heart failure)     Chronic kidney disease     Clotting disorder 09/13/2017    Colon polyp     Dementia     Diabetes mellitus     not on meds    Diabetes mellitus type II     Diverticulosis     Elevated PSA     Encounter for blood transfusion     Former smoker     General anesthetics causing adverse effect in therapeutic use     TROUBLE COMING OUT OF ANESTHESIA WITH GASTRIC BYPASS    GERD (gastroesophageal reflux disease)     Gout     Hx of colonic polyps     Hypercoagulable state     Hyperlipidemia     Hypertension     MI, old 2010    MTHFR mutation     Myocardial infarction     9/2010    Osteomyelitis of ankle or foot 03/09/2017    Prostate cancer 06/2016    Sleep apnea     Squamous cell carcinoma 01/23/2018    Left helix, imiquimod    Supratherapeutic INR 4/19/2023    Venous stasis     Chronic     Past Surgical History:   Procedure Laterality Date    ABDOMINAL SURGERY      CARDIAC SURGERY      3 STENTS     CAUDAL EPIDURAL STEROID INJECTION N/A 6/22/2020    Procedure: INJECTION, STEROID, SPINE, EPIDURAL, CAUDAL;  Surgeon: Evangelist Bose MD;  Location: [facility];  Service: Pain Management;  Laterality: N/A;    COLONOSCOPY  02/2011    diverticulosis with diverticula with clot, no active bleeding    COLONOSCOPY N/A 8/24/2016    Procedure: COLONOSCOPY;  Surgeon: Saroj BUNDY  MD Suad;  Location: [facility] ENDO;  Service: Endoscopy;  Laterality: N/A;    COLONOSCOPY N/A 11/18/2020    Procedure: COLONOSCOPY;  Surgeon: Saroj Borjas MD;  Location: [facility] ENDO;  Service: Endoscopy;  Laterality: N/A;    COLONOSCOPY N/A 10/27/2021    Procedure: COLONOSCOPY;  Surgeon: Saroj Borjas MD;  Location: [facility] ENDO;  Service: Endoscopy;  Laterality: N/A;    CREATION, COLOSTOMY, LAPAROSCOPIC N/A 4/25/2023    Procedure: CREATION, COLOSTOMY, LAPAROSCOPIC;  Surgeon: Mark Martinez Jr., MD;  Location: [facility] OR;  Service: General;  Laterality: N/A;    DEBRIDEMENT OF SACRAL WOUND N/A 4/20/2023    Procedure: DEBRIDEMENT, WOUND, SACRUM;  Surgeon: Mark Martinez Jr., MD;  Location: [facility] OR;  Service: General;  Laterality: N/A;    EPIDURAL STEROID INJECTION INTO LUMBAR SPINE N/A 6/22/2020    Procedure: Injection-steroid-epidural-lumbar;  Surgeon: Evangelist Bose MD;  Location[facility] OR;  Service: Pain Management;  Laterality: N/A;  L3 L4 L5 S1    EPIDURAL STEROID INJECTION INTO LUMBAR SPINE N/A 9/21/2020    Procedure: Injection-steroid-epidural-lumbar;  Surgeon: Evangelist Bose MD;  Location: [facility] OR;  Service: Pain Management;  Laterality: N/A;  L5-S1    FUSION, SPINE, CERVICAL N/A 3/24/2023    Procedure: FUSION, SPINE, CERVICAL;  Surgeon: Kike Kc DO;  Location: Replaced by [facility];  Service: Neurosurgery;  Laterality: N/A;  posterior cervical decompression/fusion C3-T1    GASTRIC BYPASS  2/5/2008    IVC FILTER RETRIEVAL      JOINT REPLACEMENT  1996 and 2001    bi-lat hip replacement/Rt Hip and Lt Hip    RADIOFREQUENCY ABLATION OF LUMBAR MEDIAL BRANCH NERVE AT SINGLE LEVEL Bilateral 1/10/2020    Procedure: Radiofrequency Ablation, Nerve, Spinal, Lumbar, Medial Branch, 1 Level;  Surgeon: Evangelist Bose MD;  Location: Replaced by [facility];  Service: Pain Management;  Laterality: Bilateral;  L3,L4,L5    RADIOFREQUENCY ABLATION OF LUMBAR MEDIAL BRANCH NERVE AT SINGLE LEVEL Bilateral 11/23/2021    Procedure:  Radiofrequency Ablation, Nerve, Spinal, Lumbar, Medial Branch, Bilateral L 3,4,5;  Surgeon: Nile Causey MD;  Location: [facility] OR;  Service: Pain Management;  Laterality: Bilateral;    ROTATOR CUFF REPAIR      Rt shoulder    Stents  8/18/2010    x 3    UPPER GASTROINTESTINAL ENDOSCOPY  02/2011         EXAM & DIAGNOSTICS REVIEWED:   Vitals:     Temp:  [97.7 °F (36.5 °C)-98.5 °F (36.9 °C)]   Temp: 97.7 °F (36.5 °C) (06/08/23 1120)  Pulse: 86 (06/08/23 1120)  Resp: 16 (06/08/23 1120)  BP: (!) 122/43 (06/08/23 1120)  SpO2: (!) 89 % (06/08/23 1120)    Intake/Output Summary (Last 24 hours) at 6/8/2023 1654  Last data filed at 6/8/2023 0530  Gross per 24 hour   Intake 1880 ml   Output 1900 ml   Net -20 ml      General:  In NAD. Alert and attentive, cooperative, comfortable  Eyes:  Anicteric,   EOMI , resolving facial ecchymosis, no scleral or conjunctival petechia  ENT:  No ulcers, exudates, thrush, nares patent,    Neck:  supple,   Lungs: Breathing comfortably, clear lungs  Heart:  RRR,   no murmur  Abd:  Soft, obese, LLQ ostomy with yellow stool, NT, ND,    :   Ruelas, with hematuria, Ruelas changed 6/5  Musc:  Joints without effusion, swelling, erythema, synovitis, with generalized muscle wasting.   Skin:  No rashes.    Neuro:             Alert, attentive, speech fluent, face symmetric, moves all extremities,  not Ambulatory and profoundly weak in general.  History is unreliable  Psych: Calm, pleasant, cooperative  Lymphatic:      Extrem: Mild extremity edema, without cellulitis,      VAD:   Picc left arm 4/26, no external sign of infection    Isolation:  contact  Wound:   Erosion on dorsal penis    Base of scrotum    sacrum     Small amount of bone palpable per wound care nurse(this is not new)               General Labs reviewed:  Recent Labs   Lab 06/06/23  0449 06/07/23  0440 06/08/23  0452   WBC 9.08 9.97 9.01   HGB 7.1* 7.5* 7.8*   HCT 21.0* 22.4* 23.0*    357 363       Recent Labs   Lab 06/06/23  9397  06/07/23  0440 06/08/23  0452   * 137 141   K 3.7 4.0 4.4    106 105   CO2 28 27 28   BUN 37* 33* 35*   CREATININE 1.4 1.2 1.3   CALCIUM 8.6* 8.7 9.0   PROT 5.0* 5.6* 5.6*   BILITOT 0.5 0.7 0.7   ALKPHOS 218* 204* 218*   ALT 36 35 36   AST 34 35 42*     Recent Labs   Lab 06/05/23  1643   CRP 6.79*     Procal 0/73  Lactic normal      Micro:  Microbiology Results (last 7 days)       Procedure Component Value Units Date/Time    Blood culture [[id number]]     Order Status: No result Specimen: Blood     IV catheter culture [[id number]]     Order Status: No result Specimen: Catheter Tip, PICC     Culture, Anaerobic [[id number]] Collected: 06/06/23 1059    Order Status: Completed Specimen: Wound from Sacrum Updated: 06/08/23 1503     Anaerobic Culture Culture in progress    Narrative:      Sacrum    Culture, Anaerobic [[id number]] Collected: 06/06/23 1057    Order Status: Completed Specimen: Wound from Perineum Updated: 06/08/23 1455     Anaerobic Culture No anaerobes isolated    Narrative:      Wound of the perineum/scrotum    Aerobic culture [[id number]]  (Abnormal)  (Susceptibility) Collected: 06/06/23 1057    Order Status: Completed Specimen: Wound from Perineum Updated: 06/08/23 0937     Aerobic Bacterial Culture PSEUDOMONAS AERUGINOSA  Few      Narrative:      Wound of the perineum/scrotum    Urine culture [[id number]] Collected: 06/05/23 1657    Order Status: Completed Specimen: Urine Updated: 06/08/23 0816     Urine Culture, Routine No growth    Narrative:      Specimen Source->Urine    Rapid Organism ID by PCR (from Blood culture) [[id number]]  (Abnormal) Collected: 06/05/23 1644    Order Status: Completed Updated: 06/07/23 2141     Enterococcus faecalis Not Detected     Enterococcus faecium Not Detected     Listeria monocytogenes Not Detected     Staphylococcus spp. Not Detected     Staphylococcus aureus Not Detected     Staphylococcus epidermidis Not Detected     Staphylococcus lugdunensis Not Detected      Streptococcus species Not Detected     Streptococcus agalactiae Not Detected     Streptococcus pneumoniae Not Detected     Streptococcus pyogenes Not Detected     Acinetobacter calcoaceticus/baumannii complex Not Detected     Bacteroides fragilis Not Detected     Enterobacterales Not Detected     Enterobacter cloacae complex Not Detected     Escherichia coli Not Detected     Klebsiella aerogenes Not Detected     Klebsiella oxytoca Not Detected     Klebsiella pneumoniae group Not Detected     Proteus Not Detected     Salmonella sp Not Detected     Serratia marcescens Not Detected     Haemophilus influenzae Not Detected     Neisseria meningtidis Not Detected     Pseudomonas aeruginosa Not Detected     Stenotrophomonas maltophilia Not Detected     Candida albicans Not Detected     Candida auris Not Detected     Mary glabrata Detected     Mary krusei Not Detected     Candida parapsilosis Not Detected     Candida tropicalis Detected     Cryptococcus neoformans/gattii Not Detected     CTX-M (ESBL ) Test not applicable     IMP (Carbapenem resistant) Test not applicable     KPC resistance gene (Carbapenem resistant) Test not applicable     mcr-1  Test not applicable     mec A/C  Test not applicable     mec A/C and MREJ (MRSA) gene Test not applicable     NDM (Carbapenem resistant) Test not applicable     OXA-48-like (Carbapenem resistant) Test not applicable     van A/B (VRE gene) Test not applicable     VIM (Carbapenem resistant) Test not applicable    Narrative:      Aerobic and anaerobic    Blood culture x two cultures. Draw prior to antibiotics [[id number]] Collected: 06/05/23 1644    Order Status: Completed Specimen: Blood from Line, PICC Left Cephalic Updated: 06/07/23 2023     Blood Culture, Routine Gram stain miroslava bottle: yeast      Results called to and read back by: Melonie Mason RN on 1100-wing    Narrative:      Aerobic and anaerobic    Blood culture x two cultures. Draw prior to antibiotics  [[id number]] Collected: 06/05/23 1643    Order Status: Completed Specimen: Blood from Line, PICC Left Basilic Updated: 06/07/23 1832     Blood Culture, Routine No Growth to date      No Growth to date      No Growth to date    Narrative:      Aerobic and anaerobic    Aerobic culture [[id number]]  (Abnormal) Collected: 06/06/23 1059    Order Status: Completed Specimen: Wound from Sacrum Updated: 06/07/23 1250     Aerobic Bacterial Culture GRAM NEGATIVE VON, NON-LACTOSE   Moderate  Identification and susceptibility pending      Narrative:      Sacrum    Aerobic culture [[id number]]     Order Status: Canceled Specimen: Wound from Sacrum             Imaging Reviewed:   CXR   CT abd/pelvis    Cardiology:    IMPRESSION & PLAN   1. S/p treatment for acute osteomyelitis of sacrum    2. Numerous other wounds, including erosions of base of scrotum with no clinical evidence of nikos's gangrene.  Superficial cultures of the wound are growing Gram-negative rods which is expected and need not be treated.    3. Persistent gross hematuria, for many weeks, on coumadin for MTHFR   INR down  4. Numerous other co-morbidities    Recommendations:  Repeat blood culture from picc now and in am  Establish alternate IV access now and remove picc an dculture tip  Start mycamine  Watch INR after diflucan  echocardiogram    Discussed with nursing and with Dr. Gasca    Medical Decision Making during this encounter was  [_] Low Complexity  [_] Moderate Complexity  [xx  ] High Complexity

## 2023-06-09 ENCOUNTER — CLINICAL SUPPORT (OUTPATIENT)
Dept: CARDIOLOGY | Facility: HOSPITAL | Age: 78
DRG: 579 | End: 2023-06-09
Attending: INTERNAL MEDICINE
Payer: MEDICARE

## 2023-06-09 VITALS — WEIGHT: 226 LBS | HEIGHT: 68 IN | BODY MASS INDEX: 34.25 KG/M2

## 2023-06-09 LAB
ALBUMIN SERPL BCP-MCNC: 1.9 G/DL (ref 3.5–5.2)
ALP SERPL-CCNC: 260 U/L (ref 55–135)
ALT SERPL W/O P-5'-P-CCNC: 46 U/L (ref 10–44)
ANION GAP SERPL CALC-SCNC: 9 MMOL/L (ref 8–16)
AST SERPL-CCNC: 70 U/L (ref 10–40)
BACTERIA SPEC AEROBE CULT: ABNORMAL
BACTERIA SPEC ANAEROBE CULT: NORMAL
BASOPHILS # BLD AUTO: 0.05 K/UL (ref 0–0.2)
BASOPHILS NFR BLD: 0.6 % (ref 0–1.9)
BILIRUB SERPL-MCNC: 0.5 MG/DL (ref 0.1–1)
BSA FOR ECHO PROCEDURE: 2.22 M2
BUN SERPL-MCNC: 45 MG/DL (ref 8–23)
CALCIUM SERPL-MCNC: 8.5 MG/DL (ref 8.7–10.5)
CHLORIDE SERPL-SCNC: 98 MMOL/L (ref 95–110)
CO2 SERPL-SCNC: 26 MMOL/L (ref 23–29)
CREAT SERPL-MCNC: 1.5 MG/DL (ref 0.5–1.4)
CV ECHO LV RWT: 0.69 CM
DIFFERENTIAL METHOD: ABNORMAL
ECHO LV POSTERIOR WALL: 1.45 CM (ref 0.6–1.1)
EJECTION FRACTION: 50 %
EOSINOPHIL # BLD AUTO: 0.3 K/UL (ref 0–0.5)
EOSINOPHIL NFR BLD: 3 % (ref 0–8)
ERYTHROCYTE [DISTWIDTH] IN BLOOD BY AUTOMATED COUNT: 16.5 % (ref 11.5–14.5)
EST. GFR  (NO RACE VARIABLE): 48.2 ML/MIN/1.73 M^2
FRACTIONAL SHORTENING: 5 % (ref 28–44)
GLUCOSE SERPL-MCNC: 105 MG/DL (ref 70–110)
GLUCOSE SERPL-MCNC: 106 MG/DL (ref 70–110)
GLUCOSE SERPL-MCNC: 146 MG/DL (ref 70–110)
GLUCOSE SERPL-MCNC: 89 MG/DL (ref 70–110)
GLUCOSE SERPL-MCNC: 99 MG/DL (ref 70–110)
HCT VFR BLD AUTO: 23.4 % (ref 40–54)
HGB BLD-MCNC: 7.7 G/DL (ref 14–18)
IMM GRANULOCYTES # BLD AUTO: 0.06 K/UL (ref 0–0.04)
IMM GRANULOCYTES NFR BLD AUTO: 0.7 % (ref 0–0.5)
INR PPP: 1.8 (ref 0.8–1.2)
INTERVENTRICULAR SEPTUM: 1.27 CM (ref 0.6–1.1)
LEFT INTERNAL DIMENSION IN SYSTOLE: 3.97 CM (ref 2.1–4)
LEFT VENTRICLE DIASTOLIC VOLUME INDEX: 36.14 ML/M2
LEFT VENTRICLE DIASTOLIC VOLUME: 77.7 ML
LEFT VENTRICLE MASS INDEX: 99 G/M2
LEFT VENTRICLE SYSTOLIC VOLUME INDEX: 32 ML/M2
LEFT VENTRICLE SYSTOLIC VOLUME: 68.8 ML
LEFT VENTRICULAR INTERNAL DIMENSION IN DIASTOLE: 4.18 CM (ref 3.5–6)
LEFT VENTRICULAR MASS: 213.15 G
LYMPHOCYTES # BLD AUTO: 1 K/UL (ref 1–4.8)
LYMPHOCYTES NFR BLD: 10.8 % (ref 18–48)
MAGNESIUM SERPL-MCNC: 1.9 MG/DL (ref 1.6–2.6)
MCH RBC QN AUTO: 32.1 PG (ref 27–31)
MCHC RBC AUTO-ENTMCNC: 32.9 G/DL (ref 32–36)
MCV RBC AUTO: 98 FL (ref 82–98)
MONOCYTES # BLD AUTO: 0.8 K/UL (ref 0.3–1)
MONOCYTES NFR BLD: 9.2 % (ref 4–15)
NEUTROPHILS # BLD AUTO: 6.9 K/UL (ref 1.8–7.7)
NEUTROPHILS NFR BLD: 75.7 % (ref 38–73)
NRBC BLD-RTO: 0 /100 WBC
PLATELET # BLD AUTO: 376 K/UL (ref 150–450)
PMV BLD AUTO: 9.9 FL (ref 9.2–12.9)
POTASSIUM SERPL-SCNC: 4.2 MMOL/L (ref 3.5–5.1)
PROT SERPL-MCNC: 5.7 G/DL (ref 6–8.4)
PROTHROMBIN TIME: 19.3 SEC (ref 9–12.5)
RBC # BLD AUTO: 2.4 M/UL (ref 4.6–6.2)
SODIUM SERPL-SCNC: 133 MMOL/L (ref 136–145)
WBC # BLD AUTO: 9.05 K/UL (ref 3.9–12.7)

## 2023-06-09 PROCEDURE — 94660 CPAP INITIATION&MGMT: CPT

## 2023-06-09 PROCEDURE — 25000003 PHARM REV CODE 250: Performed by: INTERNAL MEDICINE

## 2023-06-09 PROCEDURE — 83735 ASSAY OF MAGNESIUM: CPT | Performed by: FAMILY MEDICINE

## 2023-06-09 PROCEDURE — 94761 N-INVAS EAR/PLS OXIMETRY MLT: CPT

## 2023-06-09 PROCEDURE — 99900035 HC TECH TIME PER 15 MIN (STAT)

## 2023-06-09 PROCEDURE — 11044 DBRDMT BONE 1ST 20 SQ CM/<: CPT | Mod: ,,, | Performed by: FAMILY MEDICINE

## 2023-06-09 PROCEDURE — 99231 PR SUBSEQUENT HOSPITAL CARE,LEVL I: ICD-10-PCS | Mod: 25,,, | Performed by: FAMILY MEDICINE

## 2023-06-09 PROCEDURE — 63600175 PHARM REV CODE 636 W HCPCS: Performed by: INTERNAL MEDICINE

## 2023-06-09 PROCEDURE — 99223 PR INITIAL HOSPITAL CARE,LEVL III: ICD-10-PCS | Mod: 25,,, | Performed by: UROLOGY

## 2023-06-09 PROCEDURE — 25000003 PHARM REV CODE 250: Performed by: FAMILY MEDICINE

## 2023-06-09 PROCEDURE — 51700 IRRIGATION OF BLADDER: CPT | Mod: ,,, | Performed by: UROLOGY

## 2023-06-09 PROCEDURE — 93308 TTE F-UP OR LMTD: CPT | Mod: 26,,, | Performed by: INTERNAL MEDICINE

## 2023-06-09 PROCEDURE — 11044 DEBRIDEMENT: ICD-10-PCS | Mod: ,,, | Performed by: FAMILY MEDICINE

## 2023-06-09 PROCEDURE — 12000002 HC ACUTE/MED SURGE SEMI-PRIVATE ROOM

## 2023-06-09 PROCEDURE — 93308 TTE F-UP OR LMTD: CPT

## 2023-06-09 PROCEDURE — 80053 COMPREHEN METABOLIC PANEL: CPT | Performed by: FAMILY MEDICINE

## 2023-06-09 PROCEDURE — 85610 PROTHROMBIN TIME: CPT | Performed by: FAMILY MEDICINE

## 2023-06-09 PROCEDURE — 93308 ECHO (CUPID ONLY): ICD-10-PCS | Mod: 26,,, | Performed by: INTERNAL MEDICINE

## 2023-06-09 PROCEDURE — 97110 THERAPEUTIC EXERCISES: CPT

## 2023-06-09 PROCEDURE — 85025 COMPLETE CBC W/AUTO DIFF WBC: CPT | Performed by: FAMILY MEDICINE

## 2023-06-09 PROCEDURE — 87070 CULTURE OTHR SPECIMN AEROBIC: CPT | Performed by: INTERNAL MEDICINE

## 2023-06-09 PROCEDURE — 99231 SBSQ HOSP IP/OBS SF/LOW 25: CPT | Mod: 25,,, | Performed by: FAMILY MEDICINE

## 2023-06-09 PROCEDURE — 99900031 HC PATIENT EDUCATION (STAT)

## 2023-06-09 PROCEDURE — 11047 DBRDMT BONE EACH ADDL: CPT | Mod: ,,, | Performed by: FAMILY MEDICINE

## 2023-06-09 PROCEDURE — 51700 PR IRRIGATION, BLADDER: ICD-10-PCS | Mod: ,,, | Performed by: UROLOGY

## 2023-06-09 PROCEDURE — 99232 SBSQ HOSP IP/OBS MODERATE 35: CPT | Mod: ,,, | Performed by: INTERNAL MEDICINE

## 2023-06-09 PROCEDURE — 11047 DEBRIDEMENT: ICD-10-PCS | Mod: ,,, | Performed by: FAMILY MEDICINE

## 2023-06-09 PROCEDURE — 99223 1ST HOSP IP/OBS HIGH 75: CPT | Mod: 25,,, | Performed by: UROLOGY

## 2023-06-09 PROCEDURE — 99232 PR SUBSEQUENT HOSPITAL CARE,LEVL II: ICD-10-PCS | Mod: ,,, | Performed by: INTERNAL MEDICINE

## 2023-06-09 RX ADMIN — HYDROCODONE BITARTRATE AND ACETAMINOPHEN 1 TABLET: 5; 325 TABLET ORAL at 03:06

## 2023-06-09 RX ADMIN — AMIODARONE HYDROCHLORIDE 200 MG: 200 TABLET ORAL at 11:06

## 2023-06-09 RX ADMIN — FUROSEMIDE 20 MG: 20 INJECTION, SOLUTION INTRAMUSCULAR; INTRAVENOUS at 05:06

## 2023-06-09 RX ADMIN — ATORVASTATIN CALCIUM 20 MG: 20 TABLET, FILM COATED ORAL at 08:06

## 2023-06-09 RX ADMIN — GUAIFENESIN 600 MG: 600 TABLET, EXTENDED RELEASE ORAL at 08:06

## 2023-06-09 RX ADMIN — MICAFUNGIN SODIUM 100 MG: 100 INJECTION, POWDER, LYOPHILIZED, FOR SOLUTION INTRAVENOUS at 08:06

## 2023-06-09 RX ADMIN — PIPERACILLIN SODIUM AND TAZOBACTAM SODIUM 3.38 G: 3; .375 INJECTION, POWDER, LYOPHILIZED, FOR SOLUTION INTRAVENOUS at 04:06

## 2023-06-09 RX ADMIN — AMIODARONE HYDROCHLORIDE 200 MG: 200 TABLET ORAL at 08:06

## 2023-06-09 RX ADMIN — WARFARIN SODIUM 4 MG: 1 TABLET ORAL at 05:06

## 2023-06-09 RX ADMIN — WHITE PETROLATUM 41 % TOPICAL OINTMENT: at 04:06

## 2023-06-09 RX ADMIN — GUAIFENESIN 600 MG: 600 TABLET, EXTENDED RELEASE ORAL at 11:06

## 2023-06-09 RX ADMIN — ALLOPURINOL 100 MG: 100 TABLET ORAL at 08:06

## 2023-06-09 RX ADMIN — PIPERACILLIN SODIUM AND TAZOBACTAM SODIUM 3.38 G: 3; .375 INJECTION, POWDER, LYOPHILIZED, FOR SOLUTION INTRAVENOUS at 11:06

## 2023-06-09 NOTE — PROCEDURES
"Debridement    Date/Time: 6/9/2023 12:31 PM  Performed by: Glenroy Eddy DO  Authorized by: Glenroy Eddy DO     Time out: Immediately prior to procedure a "time out" was called to verify the correct patient, procedure, equipment, support staff and site/side marked as required.    Consent Done?:  Yes (Written)    Preparation: Patient was prepped and draped with aseptic technique    Local anesthesia used?: No      Wound Details:    Location:  Sacrum    Type of Debridement:  Excisional       Length (cm):  13       Area (sq cm):  91       Width (cm):  7       Percent Debrided (%):  100       Depth (cm):  4.5       Total Area Debrided (sq cm):  91    Depth of debridement:  Bone    Tissue debrided:  Bone, Epidermis, Dermis, Subcutaneous, Tendon and Muscle    Devitalized tissue debrided:  Biofilm, Exudate, Fibrin, Necrotic/Eschar and Slough    Instruments:  Curette  Bleeding:  Minimal  Hemostasis Achieved: Yes  Method Used:  Pressure  Patient tolerance:  Patient tolerated the procedure well with no immediate complications     Bone in the base of the wound was removed, soft areas of bone cleaned out, surrounding tissue was cleaned  "

## 2023-06-09 NOTE — RESPIRATORY THERAPY
06/08/23 7599   Preset CPAP/BiPAP Settings   Mode Of Delivery BiPAP;Standby   $ Is patient using? No/refused   Reason patient is not wearing?   (patient absolutely refuesed.  we tried the under the nose mask and that still did not provide comfort.  He will ask his daughter to bring his tomorrow if he is still here)

## 2023-06-09 NOTE — PROGRESS NOTES
"[facility] Medicine  Progress Note    Patient Name: Richard Bustos  MRN: [id number]  Patient Class: IP- Inpatient   Admission Date: 6/5/2023  Length of Stay: 4 days  Attending Physician: Tolu Gasca MD  Primary Care Provider: Familia Ramirez Jr, MD        Subjective:     Principal Problem:Hematuria        HPI:  Richard Bustos is a 77 y.o. White male   With PMH of as noted below,  Recently treated for sacral osteomyelitis,   S/p diverting colostomy,  who presents with worsening sacral wound.     Pt reports he feels "fine"  He denies pain at this time  Pt's wound recently grew morganella, proteus,  Then later pseudomonas (only intermediate sensitivity to cefepime), and enterobacter cloacae (intermediate sensitivity to zosyn)      Overview/Hospital Course:  Seen and examined   Afebrile . Urine clearing in the tube .  INR better  D/w family long time . D/w case Mx . Possible DC today/tomorrow     6/9  Afebrile   Got midline and old picc removed   Later BC with pseudomonas  D/w dr brush       Interval History:   As per subjective     Review of Systems  Objective:     Vital Signs (Most Recent):  Temp: 98 °F (36.7 °C) (06/09/23 1300)  Pulse: 72 (06/09/23 1300)  Resp: 16 (06/09/23 1300)  BP: 117/71 (06/09/23 1300)  SpO2: 95 % (06/09/23 1300) Vital Signs (24h Range):  Temp:  [97.8 °F (36.6 °C)-98.4 °F (36.9 °C)] 98 °F (36.7 °C)  Pulse:  [66-85] 72  Resp:  [16-18] 16  SpO2:  [92 %-97 %] 95 %  BP: (100-131)/(55-87) 117/71     Weight: 102.7 kg (226 lb 6.6 oz)  Body mass index is 34.43 kg/m².    Intake/Output Summary (Last 24 hours) at 6/9/2023 1557  Last data filed at 6/9/2023 1347  Gross per 24 hour   Intake 800 ml   Output 1300 ml   Net -500 ml         Physical Exam  Constitutional:       General: He is not in acute distress.     Appearance: He is well-developed.   HENT:      Head: Normocephalic.   Eyes:      Pupils: Pupils are equal, round, and reactive to light.   Cardiovascular:      Rate and " Rhythm: Normal rate and regular rhythm.      Pulses: Normal pulses.   Pulmonary:      Effort: Pulmonary effort is normal. No respiratory distress.   Abdominal:      General: Abdomen is flat. There is no distension.      Tenderness: There is no abdominal tenderness.   Musculoskeletal:      Cervical back: Neck supple.   Skin:     Findings: No rash.   Neurological:      Mental Status: He is alert and oriented to person, place, and time.           Significant Labs: All pertinent labs within the past 24 hours have been reviewed.  CBC:   Recent Labs   Lab 06/08/23 0452 06/09/23  0201   WBC 9.01 9.05   HGB 7.8* 7.7*   HCT 23.0* 23.4*    376     CMP:   Recent Labs   Lab 06/08/23 0452 06/09/23  0201    133*   K 4.4 4.2    98   CO2 28 26    89   BUN 35* 45*   CREATININE 1.3 1.5*   CALCIUM 9.0 8.5*   PROT 5.6* 5.7*   ALBUMIN 2.0* 1.9*   BILITOT 0.7 0.5   ALKPHOS 218* 260*   AST 42* 70*   ALT 36 46*   ANIONGAP 8 9     Cardiac Markers: No results for input(s): CKMB, MYOGLOBIN, BNP, TROPISTAT in the last 48 hours.    Significant Imaging: I have reviewed all pertinent imaging results/findings within the past 24 hours.      Assessment/Plan:      Active Hospital Problems    Diagnosis    *Hematuria    Candidemia    CLAU (iron deficiency anemia)    Pneumoperitoneum of unknown etiology    Closed nondisplaced fracture of lateral malleolus of fibula    Bedbound    Decubitus ulcer, multiple chronic    Anticoagulated on Coumadin    Anemia    HFrEF (heart failure with reduced ejection fraction)    Scrotal injury    Severe protein-calorie malnutrition    Pressure injury of sacral region, stage 4    Bacteremia    Complex renal cyst    MCI (mild cognitive impairment)    Paroxysmal atrial fibrillation     EKG stable  Stable on Coreg, no missed doses of anticoagulation. Continue anticoagulation as described below      LV dysfunction, EF 40%     Last Echo 5/2020  Repeat echo stable  Continue lisinopril  and Coreg as now  Monitor      CKD (chronic kidney disease) stage 3, GFR 30-59 ml/min, 41    Aortic aneurysm, thoracic, 4.3 cm, 8/2010     Measurement of aortic root appears stable- has not had CTA chest to monitor.  Will discuss with Dr. Soto and will recommend CT chest to follow      Diabetes mellitus with chronic kidney disease, stage III    MTHFR mutation (methylenetetrahydrofolate reductase)    Sleep apnea, using BiPAP nightly, 1999, last sleep test in 2013    Hypertension associated with diabetes    CAD (coronary artery disease)    GERD (gastroesophageal reflux disease)        Plan:  Added zosyn for pseudomonas in blood culture   Micagungin and follow final yeast culture   Contact isolation precautions  Discontinue IV antibiotics as per Infectious Disease and monitoring, gram neg from superficial wound culture   Continue to hold Coumadin and trend INR. May resume tonight is hematuria clear over the day . 4 mg   Continue IV Lasix 20 mg b.i.d. and following volume status  S/p IV iron  Keep U cath and possible DC with U cath   Appear he is on triple therapy at home . Had cardiac stent but will d/w family   ECHO negative for infection signs. D/w cardiology   LTAC approval awaited   Medically cleared for transfer to LTAC    VTE Risk Mitigation (From admission, onward)         Ordered     warfarin (COUMADIN) tablet 4 mg  Daily         06/08/23 1402     IP VTE HIGH RISK PATIENT  Once         06/05/23 2315     Place sequential compression device  Until discontinued         06/05/23 2315     Reason for No Pharmacological VTE Prophylaxis  Once        Question:  Reasons:  Answer:  Already adequately anticoagulated on oral Anticoagulants    06/05/23 2315                Discharge Planning   ORACIO: 6/9/2023     Code Status: Full Code   Is the patient medically ready for discharge?:     Reason for patient still in hospital (select all that apply): Treatment  Discharge Plan A: Long-term acute care facility (LTAC)    Discharge Delays: (!) Ambulance Transport/Facility Transport              Tolu Gasca MD  Department of Hospital Medicine   [facility]

## 2023-06-09 NOTE — NURSING
Contacted house supervisor about placement of Midline, stated she would see who was available to do it. Stated someone form ED would come when available.

## 2023-06-09 NOTE — PLAN OF CARE
06/09/23 1154   Final Note   Assessment Type Final Discharge Note   Anticipated Discharge Disposition SNF   What phone number can be called within the next 1-3 days to see how you are doing after discharge? [phone]   Hospital Resources/Appts/Education Provided Post-Acute resouces added to AVS   Post-Acute Status   Post-Acute Authorization Placement   Post-Acute Placement Status Set-up Complete/Auth obtained   Discharge Delays (!) Ambulance Transport/Facility Transport     Patient discharging to [facility], Bed Assignment: Cherise will be the nurse [phone] will setup [facility] to transport, no other needs identified at this time.

## 2023-06-09 NOTE — PROGRESS NOTES
Wound Care Progress Note    Subjective:       Patient ID: Richard Bustos is a 77 y.o. male.    Chief Complaint: Wound Check and r fibula fracture    HPI  Pt seen at bedside for a FU visit for multiple open wounds to the BLE, and a stage 4 sacral pressure ulcer and erosive injury to the penis. Pt is improving and transferring to TCU in [place]. Pt has no new complaints today. Pt family is with him at bedside and will assist him in the TCU.  Review of Systems   Skin:  Positive for wound.        Open wounds to the sacrum, penis and BLE   All other systems reviewed and are negative.      Objective:        Physical Exam  Vitals and nursing note reviewed. Exam conducted with a chaperone present.   Constitutional:       General: He is not in acute distress.     Appearance: Normal appearance. He is not ill-appearing, toxic-appearing or diaphoretic.   Skin:     General: Skin is warm and dry.      Findings: Lesion present.      Comments: Stage 4 sacral pressure ulcer, bone exposed, biofilm, Multiple BLE open wounds, stable and an open wound of the penis from the indwelling mcdaniels, stable   Neurological:      General: No focal deficit present.      Mental Status: He is alert and oriented to person, place, and time.   Psychiatric:         Mood and Affect: Mood normal.         Behavior: Behavior normal.         Thought Content: Thought content normal.         Judgment: Judgment normal.       Vitals:    06/09/23 0814   BP: 111/62   Pulse: 76   Resp: 16   Temp: 97.8 °F (36.6 °C)       Assessment:           ICD-10-CM ICD-9-CM   1. Pneumoperitoneum of unknown etiology  K66.8 568.89   2. Wound check, abscess  Z51.89 V58.89   3. Osteomyelitis  M86.9 730.20   4. Chest pain  R07.9 786.50   5. Pressure injury of sacral region, stage 4 [L89.154 (ICD-10-CM)]  L89.154 707.03     707.24   6. Gross hematuria  R31.0 599.71   7. Bedbound  Z74.01 V49.84   8. Pressure injury of other site, stage 2  L89.892 707.09     707.22   9.  Paroxysmal atrial fibrillation, new onset, 11/2014  I48.0 427.31   10. MTHFR mutation (methylenetetrahydrofolate reductase)  Z15.89 V84.89   11. Candidemia  B37.7 112.5     995.91   12. Hematuria [R31.9 (ICD-10-CM)]  R31.9 599.70   13. Pressure injury of sacral region, stage 4  L89.154 707.03     707.24            Altered Skin Integrity 04/28/23 1745 Left Arm (Active)   04/28/23 1745   Altered Skin Integrity Present on Admission - Did Patient arrive to the hospital with altered skin?: yes   Side: Left   Orientation:    Location: Arm   Wound Number:    Is this injury device related?:    Primary Wound Type:    Description of Altered Skin Integrity:    Ankle-Brachial Index:    Pulses:    Removal Indication and Assessment:    Wound Outcome:    (Retired) Wound Length (cm):    (Retired) Wound Width (cm):    (Retired) Depth (cm):    Wound Description (Comments):    Removal Indications:    Wound Image   06/06/23 1100   Dressing Appearance Dry;Clean;Intact 06/07/23 1935   Drainage Amount None 06/07/23 1935   Drainage Characteristics/Odor Serous 06/06/23 1940   Appearance Dressing in place, unable to visualize 06/08/23 1925   Dressing Changed 06/09/23 1012            Altered Skin Integrity 04/28/23 1745 Sacral spine Ulceration Full thickness tissue loss. Base is covered by slough and/or eschar in the wound bed (Active)   04/28/23 1745   Altered Skin Integrity Present on Admission - Did Patient arrive to the hospital with altered skin?: yes   Side:    Orientation:    Location: Sacral spine   Wound Number:    Is this injury device related?:    Primary Wound Type: Ulceration   Description of Altered Skin Integrity: Full thickness tissue loss. Base is covered by slough and/or eschar in the wound bed   Ankle-Brachial Index:    Pulses:    Removal Indication and Assessment:    Wound Outcome:    (Retired) Wound Length (cm):    (Retired) Wound Width (cm):    (Retired) Depth (cm):    Wound Description (Comments):    Removal  Indications:    Wound Image    06/06/23 1100   Dressing Appearance Intact;Dry 06/06/23 1940   Drainage Characteristics/Odor Serosanguineous 06/06/23 1940   Appearance Dressing in place, unable to visualize 06/08/23 1925   Care Cleansed with:;Sterile normal saline;Wound cleanser 06/07/23 1617   Dressing Changed 06/09/23 1012            Altered Skin Integrity 04/28/23 1745 Left anterior;lateral Foot Ulceration (Active)   04/28/23 1745   Altered Skin Integrity Present on Admission - Did Patient arrive to the hospital with altered skin?: yes   Side: Left   Orientation: anterior;lateral   Location: Foot   Wound Number:    Is this injury device related?:    Primary Wound Type: Ulceration   Description of Altered Skin Integrity:    Ankle-Brachial Index:    Pulses:    Removal Indication and Assessment:    Wound Outcome:    (Retired) Wound Length (cm):    (Retired) Wound Width (cm):    (Retired) Depth (cm):    Wound Description (Comments):    Removal Indications:    Dressing Appearance Dry;Intact 06/06/23 1940   Appearance Dressing in place, unable to visualize 06/08/23 1925   Dressing Changed 06/09/23 1012            Altered Skin Integrity 04/28/23 1745 Right distal Toe, first Ulceration Full thickness tissue loss. Base is covered by slough and/or eschar in the wound bed (Active)   04/28/23 1745   Altered Skin Integrity Present on Admission - Did Patient arrive to the hospital with altered skin?: yes   Side: Right   Orientation: distal   Location: Toe, first   Wound Number:    Is this injury device related?:    Primary Wound Type: Ulceration   Description of Altered Skin Integrity: Full thickness tissue loss. Base is covered by slough and/or eschar in the wound bed   Ankle-Brachial Index:    Pulses:    Removal Indication and Assessment:    Wound Outcome:    (Retired) Wound Length (cm):    (Retired) Wound Width (cm):    (Retired) Depth (cm):    Wound Description (Comments):    Removal Indications:    Wound Image     06/06/23  1100   Dressing Appearance Open to air 06/08/23 1925   Dressing Changed 06/09/23 1012            Altered Skin Integrity 05/02/23 1600 Right lateral Thigh Ulceration (Active)   05/02/23 1600   Altered Skin Integrity Present on Admission - Did Patient arrive to the hospital with altered skin?:    Side: Right   Orientation: lateral   Location: Thigh   Wound Number:    Is this injury device related?: Yes   Primary Wound Type: Ulceration   Description of Altered Skin Integrity:    Ankle-Brachial Index:    Pulses:    Removal Indication and Assessment:    Wound Outcome:    (Retired) Wound Length (cm):    (Retired) Wound Width (cm):    (Retired) Depth (cm):    Wound Description (Comments):    Removal Indications:    Dressing Appearance Dry;Intact;Clean 06/08/23 1925   Drainage Amount None 06/08/23 1925   Appearance Dressing in place, unable to visualize 06/08/23 1925            Altered Skin Integrity 05/02/23 1600 Penis (Active)   05/02/23 1600   Altered Skin Integrity Present on Admission - Did Patient arrive to the hospital with altered skin?:    Side:    Orientation:    Location: Penis   Wound Number:    Is this injury device related?: Yes   Primary Wound Type:    Description of Altered Skin Integrity:    Ankle-Brachial Index:    Pulses:    Removal Indication and Assessment:    Wound Outcome:    (Retired) Wound Length (cm):    (Retired) Wound Width (cm):    (Retired) Depth (cm):    Wound Description (Comments):    Removal Indications:    Wound Image   06/06/23 1100   Dressing Appearance Open to air 06/07/23 1935   Drainage Amount None 06/07/23 1935   Appearance Pink;Red 06/07/23 1617   Care Cleansed with:;Soap and water;Applied:;Other (see comments) 06/07/23 1617            Altered Skin Integrity 05/05/23 1415 posterior Scrotum Traumatic (Active)   05/05/23 1415   Altered Skin Integrity Present on Admission - Did Patient arrive to the hospital with altered skin?:    Side:    Orientation: posterior   Location: Scrotum    Wound Number:    Is this injury device related?:    Primary Wound Type: Traumatic   Description of Altered Skin Integrity:    Ankle-Brachial Index:    Pulses:    Removal Indication and Assessment:    Wound Outcome:    (Retired) Wound Length (cm):    (Retired) Wound Width (cm):    (Retired) Depth (cm):    Wound Description (Comments):    Removal Indications:    Wound Image    06/06/23 1100   Dressing Appearance Dry;Intact;Clean 06/08/23 1925   Drainage Amount None 06/08/23 1925   Drainage Characteristics/Odor Serosanguineous 06/07/23 1617   Appearance Dressing in place, unable to visualize 06/08/23 1925   Care Cleansed with:;Sterile normal saline;Wound cleanser 06/07/23 1617   Dressing Removed 06/09/23 1012   Packing packed with 06/07/23 1617            Altered Skin Integrity 05/26/23 1715 Right posterior Heel Shearing (Active)   05/26/23 1715   Altered Skin Integrity Present on Admission - Did Patient arrive to the hospital with altered skin?:    Side: Right   Orientation: posterior   Location: Heel   Wound Number:    Is this injury device related?:    Primary Wound Type: Shearing   Description of Altered Skin Integrity:    Ankle-Brachial Index:    Pulses:    Removal Indication and Assessment:    Wound Outcome:    (Retired) Wound Length (cm):    (Retired) Wound Width (cm):    (Retired) Depth (cm):    Wound Description (Comments):    Removal Indications:    Wound Image   06/06/23 1100   Dressing Appearance Dry;Intact;Clean 06/08/23 1925   Drainage Amount Scant 06/08/23 1925   Appearance Dressing in place, unable to visualize 06/08/23 1925   Dressing Changed 06/09/23 1012            Altered Skin Integrity 05/26/23 1715 Left posterior Heel Shearing Full thickness tissue loss. Base is covered by slough and/or eschar in the wound bed (Active)   05/26/23 1715   Altered Skin Integrity Present on Admission - Did Patient arrive to the hospital with altered skin?:    Side: Left   Orientation: posterior   Location: Heel    Wound Number:    Is this injury device related?:    Primary Wound Type: Shearing   Description of Altered Skin Integrity: Full thickness tissue loss. Base is covered by slough and/or eschar in the wound bed   Ankle-Brachial Index:    Pulses:    Removal Indication and Assessment:    Wound Outcome:    (Retired) Wound Length (cm):    (Retired) Wound Width (cm):    (Retired) Depth (cm):    Wound Description (Comments):    Removal Indications:    Wound Image    06/06/23 1100   Dressing Appearance Dry;Intact 06/06/23 1940   Appearance Dressing in place, unable to visualize 06/08/23 1925   Dressing Changed 06/09/23 1012            Altered Skin Integrity 05/26/23 1715 Right dorsal Foot Abrasion(s) (Active)   05/26/23 1715   Altered Skin Integrity Present on Admission - Did Patient arrive to the hospital with altered skin?:    Side: Right   Orientation: dorsal   Location: Foot   Wound Number:    Is this injury device related?:    Primary Wound Type: Abrasion(s)   Description of Altered Skin Integrity:    Ankle-Brachial Index:    Pulses:    Removal Indication and Assessment:    Wound Outcome:    (Retired) Wound Length (cm):    (Retired) Wound Width (cm):    (Retired) Depth (cm):    Wound Description (Comments):    Removal Indications:    Dressing Appearance Open to air 06/06/23 1940   Appearance Dressing in place, unable to visualize 06/08/23 1925   Dressing Changed 06/09/23 1012           Plan:       Closed nondisplaced fracture of lateral malleolus of fibula  1. Right distal fibula fracture, nondisplaced.    Patient is nonambulatory.  His fracture is nondisplaced.  Would normally treat this in a cam walker boot or some type of splint.  However, considering his nonambulatory status and his multiple wounds/decubiti to this lower extremity, we will leave open.  He is in a soft dressing.  This is more than sufficient to protect.  Should heal on its own without any further intervention over the next 6-8 weeks.  No surgery is  planned for this current time.  He can follow up with us in the clinic as an outpatient for repeat radiographs to monitor healing.  Thank you for the consultation.     Contact information for follow-up providers       Familia Ramirez Jr, MD Follow up in 1 month(s).    Specialty: Family Medicine  Contact information:  [address]  [address]  [place] [address]  [phone]               Adeline Moss MD Follow up in 2 week(s).    Specialty: Urology  Contact information:  [address] [place] [address]   [address]  [place] [address]  [phone]               follow up with own cardiologist / PCP whether he need triple therapy Follow up.               Melissa Alberto MD Follow up.    Specialties: Infectious Diseases, Wound Care  Why: consult dr alberto at the facility  Contact information:  [address] [facility]  [address]  [place] [address]  [phone]               Melissa Alberto MD .    Specialties: Infectious Diseases, Wound Care  Contact information:  [address] [facility] [address]  [facility] [address]  [phone]                       Contact information for after-discharge care       Destination       [facility] .    Service: Skilled Nursing  Contact information:  [address] [facility] [address]  [facility] [address] [phone]                                   A/P  Stage 4 sacral pressure ulcer - start VAC at transitional care, debrided today  Open wound of the penis - triad daily and PRN  BLE open wounds - triad to all wounds today  Will follow up in transitional care

## 2023-06-09 NOTE — PT/OT/SLP PROGRESS
Physical Therapy      Patient Name:  Richard Bustos   MRN:  [id number]    Patient not seen today secondary to Patient unwilling to participate, Patient fatigue. Will follow-up 6/12/23.

## 2023-06-09 NOTE — PRE ADMISSION SCREENING
[facility]   Pre-Admission Patient Screening                    Pre-Screen type:  LTAC    Facility Status: Accept     Referring Physician:  Tolu Gasca MD     Admitting Physician:  Sailaja Grey MD    Primary Care Physician:  Familia Ramirez Jr, MD    History         Patient Active Problem List    Diagnosis Date Noted    Wound check, abscess 06/10/2023    Candidemia 06/08/2023    CLAU (iron deficiency anemia) 06/07/2023    Pneumoperitoneum of unknown etiology 06/06/2023    Closed nondisplaced fracture of lateral malleolus of fibula 06/06/2023    Bedbound 06/06/2023    Decubitus ulcer, multiple chronic 06/06/2023    Anticoagulated on Coumadin 06/06/2023    Anemia 06/06/2023    HFrEF (heart failure with reduced ejection fraction) 06/05/2023    Hematuria 06/02/2023    Scrotal injury 05/17/2023    Severe protein-calorie malnutrition 04/28/2023    Proteus infection 04/22/2023    Pressure injury of sacral region, stage 4 04/19/2023    Chronic anemia 04/01/2023    Multiple skin tears 03/25/2023    Closed nondisplaced fracture of sixth cervical vertebra 03/21/2023    Ulcer of toe, left, with fat layer exposed 01/15/2023    Depression, recurrent 10/30/2022    Abdominal aortic atherosclerosis 01/24/2022    Secondary hyperparathyroidism of renal origin 01/19/2022    Bacteremia 01/08/2022    History of arteriovenous malformation (AVM) 10/27/2021    Dementia without behavioral disturbance 03/14/2021    Warfarin anticoagulation 11/17/2020    Hypoalbuminemia 07/22/2020    Degenerative disc disease, lumbar 06/22/2020    OAB (overactive bladder) 03/31/2020    Urge incontinence 03/31/2020    Complex renal cyst 03/31/2020    Back pain of lumbosacral region with sciatica 11/13/2019    Chronic systolic congestive heart failure 10/19/2019    History of MI (myocardial infarction), 2010 07/25/2019    Status post insertion of drug eluting coronary artery stent, 3x, last RCA 1/2015 07/25/2019    Cardiomyopathy  05/13/2019    MCI (mild cognitive impairment) 09/18/2018    Uncomplicated opioid dependence 08/13/2018    Anemia due to stage 3 chronic kidney disease 09/13/2017    Facet arthropathy 06/22/2017    Peripheral vascular disease 02/16/2017    Generalized weakness 12/06/2016    Prostate cancer 08/01/2016    Hypertensive left ventricular hypertrophy, without heart failure 02/08/2016    Stasis dermatitis of both legs 01/22/2015    Proteinuria 11/21/2014    Paroxysmal atrial fibrillation 11/21/2014    BPH with obstruction/lower urinary tract symptoms 11/04/2014    Osteoarthritis, knee 07/21/2014    H/O bariatric surgery, 2008 04/09/2014    Diabetic neuropathy 06/19/2013    OA (osteoarthritis). post bilateral THR, 2001 02/22/2013    Long term (current) use of anticoagulants 02/13/2013    LV dysfunction, EF 40% 02/08/2013    CKD (chronic kidney disease) stage 3, GFR 30-59 ml/min, 41 01/23/2013    Obesity (BMI 30-39.9) 07/10/2012    Carotid disease, bilateral 07/10/2012    Aortic aneurysm, thoracic, 4.3 cm, 8/2010 07/10/2012    Anxiety 02/05/2012    Diabetes mellitus with chronic kidney disease, stage III 12/16/2011    MTHFR mutation (methylenetetrahydrofolate reductase) 12/16/2011    Sleep apnea, using BiPAP nightly, 1999, last sleep test in 2013 12/16/2011    Hypertension associated with diabetes 12/16/2011    CAD (coronary artery disease) 12/16/2011    Hyperlipidemia associated with type 2 diabetes mellitus 12/16/2011    Gout 12/16/2011    GERD (gastroesophageal reflux disease) 12/16/2011    Hypercoagulable state, Prothrombin mutation 12/16/2011    Colon polyps 12/16/2011         Previous Specialties/Consulted physicians:      General Surgery , Infectious Diseases , and Wound Care       Past and Current Medical History    Past Medical History:   Diagnosis Date    Anemia     Anemia of other chronic disease 09/13/2017    Anemia, chronic renal failure 09/13/2017    Anemia, unspecified 09/13/2017    Anticoagulant long-term use      Aorta aneurysm     Arthritis     Atrial fibrillation     Bacteremia due to Streptococcus 01/08/2022    CAD (coronary artery disease)     CHF (congestive heart failure)     Chronic kidney disease     Clotting disorder 09/13/2017    Colon polyp     Dementia     Diabetes mellitus     not on meds    Diabetes mellitus type II     Diverticulosis     Elevated PSA     Encounter for blood transfusion     Former smoker     General anesthetics causing adverse effect in therapeutic use     TROUBLE COMING OUT OF ANESTHESIA WITH GASTRIC BYPASS    GERD (gastroesophageal reflux disease)     Gout     Hx of colonic polyps     Hypercoagulable state     Hyperlipidemia     Hypertension     MI, old 2010    MTHFR mutation     Myocardial infarction     9/2010    Osteomyelitis of ankle or foot 03/09/2017    Prostate cancer 06/2016    Sleep apnea     Squamous cell carcinoma 01/23/2018    Left helix, imiquimod    Supratherapeutic INR 4/19/2023    Venous stasis     Chronic           History of Present Illness     Reason for Consult:  TRE kim     HPI:     Richard Bustos is a 77 y.o. male known to our service for treatment of stage 4 sacral decubitus, surgically debrided 4/20 with diverting colostomy 4/25 then treated at [facility] through 5/31 then to TCU in same facility. He completed 4+ weeks of broad spectrum antibiotics, adjusted part way through for new tissue cultures with pseudomonas/enterobacter, with history of long term augmentin(6 weeks prior to that admission) for possible osteomyelitis of foot ulcer. He was sent to the ED for concern of Halle's gangrene as scrotal wounds were felt to be worse. He has been CT scanned, seen by wound care and general surgery. Numerous wound photos were reviewed and d/w wound care RN. Wound care MD intends to debride a bit tomorrow and wound vac will be replaced to sacrum.  He submitted superficial swab cultures of the scrotum and sacrum. Additionally he has had gross hematuria the entire time he  as at LTAC and SNF, worse with elevated INR,  on coumadin for history of MTHFR mutation, history of prostate cancer, status unknown.      6/7: Interim reviewed.  Afebrile, urine culture negative, so far.  wound cultures have Gram-negative rods as expected.  Ruelas was irrigated and improved in quality.  Urology consult pending.  Hemoglobin is stable at 7.5.  Discussed with his family that he has been in remission from prostate cancer for some time.  They do not know the etiology of the hematuria but comment that he has renal cysts.  He tells me that the catheter was changed today but it actually was changed on the 5th.    6/8: interim reviewed. Blood cultures drawn on 6/5 have grown candida tropicalis and glabrata. Diflucan was begun last night. He has no fever. His picc line is over a month old. His urine culture is negative. He has no new complaints and is at his recent baseline. No change in vision. No peripheral signs of candidemia. Wound cultures notes, but represent surface colonization.      S/p treatment for acute osteomyelitis of sacrum     2.         Numerous other wounds, including erosions of base of scrotum with no clinical evidence of nikos's gangrene.  Superficial cultures of the wound are growing Gram-negative rods which is expected and need not be treated.     3.         Persistent gross hematuria, for many weeks, on coumadin for MTHFR              INR down  4.         Numerous other co-morbidities     Recommendations:  Repeat blood culture from picc now and in am  Establish alternate IV access now and remove picc an dculture tip  Start mycamine  Watch INR after diflucan  echocardiogram     Discussed with nursing and with Dr. Campos CONNELLY Admission Criteria:    Complex wound care for infected/necrotic skin conditions, Stage III or IV pressure injury, surgical wounds, or burns requiring at least one of the following:  Dressing changes requiring IM/IV analgesics  Wound debridement  Wound  Management requiring 24 hour observation and positioning at least every 2 hours by licensed personnel    Must meet at least one of the following concomitant treatments/intervention daily unless notes: (excludes PO meds unless notes)  IV medication per therapeutic regimen, Cardiac Monitoring, Laboratory assessment and medication adjustment(s), Negative pressure wound therapy, and Rehab Therapy (PT/OT/ST) 1-3 hours a day greater than or equal to 5 days a week    Due to patient being on Zosyn Q 8 and Mycamine daily too complex for SNF unit     LTACH more appropriate than other levels of care (eg, skilled nursing facility, home health care), as indicated by:    Frequent diagnostic services needed on an inpatient basis, including clinical assessments, laboratory, and imaging as evidence by: Frequent monitoring and clinical assessments performed by a licensed RN to identify current and future patient needs by incorporating the recognition of normal vs abnormal body physiology, and to prompt recognition of pertinent changes to identify and prioritize appropriate interventions that can be performed within the acute inpatient setting. , Frequent monitoring and clinical assessments performed by a licensed RT to identify current and future patient needs by incorporating the recognition of normal vs abnormal body physiology of the Respiratory System, and to prompt recognition of pertinent changes to identify and prioritize appropriate interventions that can be performed within the acute inpatient setting. , and Frequent laboratory testing and/or imaging to aide in the improvement and effectiveness of patient's individualized treatment plan.   More intensive services, such as speciality nursing care, and/or onsite physician assessments needed that are not available at a lower level of care as evidence by: Daily physician intervention , Collaboration between consulting and attending providers still deemed a necessity to aide in  the improvement and effectiveness of patient's individualized treatment plan, which can be provided at an LTACH level of care. , and Therapy Services to be included in patient's treatment plan in an effort to restore/improve patient's modality status to a safe level of functioning prior to acute illness.    Lower level of care has failed (eg, patient readmitted to acute care from a lower level of care).   Palliative and/or Hospice Care has been refused by patient/patient family.    [facility] as indicated by ALL of the following:      Hypotension Absent     Cardiovascular status stable     Stable chest findings     Renal function accepctable   Pain adequately managed    Intake acceptable       No acute significant hepatic dysfunction (eg, new encephalopathy)   No acute severe unstable neurologic abnormalities (eg, Altered mental status that is severe or persistent, or evidence of ongoing CNS embolization or ischemia, worsening hydrocephalus)   No active bleeding or unstable disorders of hemostasis (eg, no recent need for transfusion, severe thrombocytopenia with bleeding)   No need for respiratory or other isolation, OR manageable at LTACH level of care    Long-term enteral feeding (eg, PEG) and intravenous access established, not needed, OR to be placed at LTACH level of care      Anticipated Discharge Disposition:    SNF [facility]               Patient Traveled outside of the [place] in the last 3 months? no     Patient discharged from this LTAC to SNF within the last 45 days? no    Patient discharged from this LTAC to Rehab within the last 27 days? no    Prior residence: nursing home/SNF    Prior Post-Acute Services: N/A     Allergies:   Review of patient's allergies indicates:   Allergen Reactions    Donepezil Other (See Comments)     AMS    Bactroban [mupirocin calcium] Blisters     Causes Blisters    Shellfish containing products Other (See Comments)     Other reaction(s):  Gout  OYSTERS       Has patient received the current influenza vaccine (Oct 1 - March 31)? Unknown     Has patient received PPD skin test prior to admit? N/A     Code Status: Full Code    Orientation: Person    Speech: normal     Vital Signs:     Date 6/9/23 6/12/23   Blood Pressure 117/71 130/79   Pulse 72 70   Respiratory Rate 16 18   O2 Saturation 95% Room air 94% Room air    Temperature 98.0 97.5        Bowel/Bladder: incontinent of bladder and incontinent of bowel    Dialysis: N/A         Midline Catheter Insertion/Assessment  - Single Lumen 06/09/23 0145 Right basilic vein (medial side of arm) (Active)   Site Assessment Clean;Dry;Intact 06/09/23 0701   IV Device Securement catheter securement device 06/09/23 0701   Line Status Saline locked 06/09/23 0701   Dressing Type Central line dressing 06/09/23 0701   Dressing Status Clean;Dry;Intact 06/09/23 0701   Dressing Intervention Integrity maintained 06/09/23 0701   Reason Not Rotated Poor venous access 06/09/23 0701   Number of days: 0            Colostomy 04/25/23 1425 LLQ (Active)   Wound Image   05/15/23 1630   Stomal Appliance 2 piece;Clean;Intact;Dry 06/09/23 0701   Stoma Appearance round;rosebud appearance 06/09/23 0701   Stoma Size (in) 57mm 05/15/23 1630   Site Assessment Clean;Intact 06/09/23 0701   Peristomal Assessment Clean;Intact 06/09/23 0701   Accessories/Skin Care antifungal powder 05/17/23 0728   Stoma Function flatus;stool;brown;yellow 06/08/23 1925   Treatment Other (Comment) 06/01/23 1523   Fluid Instilled (ml) 300 ml 06/08/23 0522   Tolerance no signs/symptoms of discomfort 06/08/23 1925   Output (mL) 100 mL 06/09/23 0335   Number of days: 44            Urethral Catheter 06/05/23 0948 Silicone (Active)   Site Assessment Clean;Intact 06/09/23 0701   Collection Container Urimeter 06/09/23 0701   Securement Method secured to top of thigh w/ adhesive device 06/09/23 0701   Catheter Care Performed yes 06/09/23 0701   Reason for Continuing Urinary  "Catheterization Non-healing sacral/perineal wound 06/09/23 0701   CAUTI Prevention Bundle Securement Device in place with 1" slack;Intact seal between catheter & drainage tubing;Sheeting clip in use;Drainage bag/urimeter off the floor;No dependent loops or kinks;Drainage bag/urimeter not overfilled (<2/3 full);Drainage bag/urimeter below bladder 06/09/23 0701   Output (mL) 600 mL 06/09/23 0335   Number of days: 4       CBGs/Accuchecks: Yes     Precautions: Fall, Cardiac, Blood Pressure/Syncope, Aspiration, Anti-Coagulation Meds, and Seizures    Restraints: No     Isolation Precautions: Contact Isolation and Special instructions:  and MDRO         Facility-Administered Medications as of 6/12/2023   Medication Dose Route Frequency Provider Last Rate Last Admin    acetaminophen tablet 650 mg  650 mg Oral Q8H PRN Sailaja Grey MD        albuterol-ipratropium 2.5 mg-0.5 mg/3 mL nebulizer solution 3 mL  3 mL Nebulization Q4H PRN Sailaja Grey MD        allopurinoL tablet 100 mg  100 mg Oral QHS Sailaja Grey MD   100 mg at 06/11/23 2024    amiodarone tablet 200 mg  200 mg Oral BID Sailaja Grey MD   200 mg at 06/11/23 2024    atorvastatin tablet 20 mg  20 mg Oral QHS Deidra Valle MD   20 mg at 06/11/23 2024    [COMPLETED] cefepime 2 g in dextrose 5% 100 mL IVPB (ready to mix)  2 g Intravenous Once Jay Jay Chávez Jr., MD   Stopped at 06/05/23 1850    dextrose 50% injection 12.5 g  12.5 g Intravenous PRN Sailaja Grey MD        dextrose 50% injection 25 g  25 g Intravenous PRN Sailaja Grey MD        [COMPLETED] ferric gluconate (FERRLECIT) 125 mg in sodium chloride 0.9% 100 mL IVPB  125 mg Intravenous Once Deidra Vlale MD   Stopped at 06/07/23 1057    [COMPLETED] ferric gluconate (FERRLECIT) 125 mg in sodium chloride 0.9% 110 mL IVPB  125 mg Intravenous Once Deidra Valle MD   Stopped at 06/06/23 2202    furosemide injection 20 mg  20 mg Intravenous Q12H Deidra Valle MD   20 mg " at 06/12/23 0531    glucagon (human recombinant) injection 1 mg  1 mg Intramuscular PRN Sailaja Grey MD        glucose chewable tablet 16 g  16 g Oral PRN Sailaja Grey MD        glucose chewable tablet 24 g  24 g Oral PRN Sailaja Grey MD        guaiFENesin 12 hr tablet 600 mg  600 mg Oral BID Sailaja Grey MD   600 mg at 06/11/23 2023    HYDROcodone-acetaminophen 5-325 mg per tablet 1 tablet  1 tablet Oral Q4H PRN Saliaja Grey MD   1 tablet at 06/11/23 0008    insulin aspart U-100 pen 1-10 Units  1-10 Units Subcutaneous QID (AC + HS) PRN Sailaja Grey MD   1 Units at 06/11/23 2239    [COMPLETED] iohexoL (OMNIPAQUE 350) injection 100 mL  100 mL Intravenous ONCE PRN Jay Jay Chávez Jr., MD   100 mL at 06/05/23 2036    [COMPLETED] lactated ringers bolus 2,052 mL  30 mL/kg (Ideal) Intravenous Once Jay Jay Chávez Jr., MD   Stopped at 06/05/23 2325    melatonin tablet 6 mg  6 mg Oral Nightly PRN Sailaja Grey MD   6 mg at 06/12/23 0019    micafungin 100 mg in sodium chloride 0.9 % 100 mL IVPB (ready to mix)  100 mg Intravenous Q24H Di Long MD   Stopped at 06/11/23 2339    morphine injection 2 mg  2 mg Intravenous Q4H PRN Deidra Valle MD        naloxone 0.4 mg/mL injection 0.02 mg  0.02 mg Intravenous PRN Sailaja Grey MD        ondansetron injection 4 mg  4 mg Intravenous Q6H PRN Sailaja Grey MD        piperacillin-tazobactam 3.375 g in dextrose 5 % 100 mL IVPB (ready to mix)  3.375 g Intravenous Q8H Di Long MD   Stopped at 06/12/23 0606    polyethylene glycol packet 17 g  17 g Oral BID PRN Sailaja Grey MD        senna-docusate 8.6-50 mg per tablet 1 tablet  1 tablet Oral BID PRN Sailaja Grey MD        simethicone chewable tablet 80 mg  1 tablet Oral QID PRN Sailaja Grey MD        sodium chloride 0.9% flush 10 mL  10 mL Intravenous PRN Sailaja Grey MD        [COMPLETED] vancomycin in dextrose 5 % 1 gram/250 mL IVPB 1,000 mg  1,000 mg Intravenous  Once Jay Jay Chávez Jr., MD   Stopped at 06/05/23 2348    And    [COMPLETED] vancomycin in dextrose 5 % 1 gram/250 mL IVPB 1,000 mg  1,000 mg Intravenous Once Jay Jay Chávez Jr., MD   Stopped at 06/05/23 2205    warfarin (COUMADIN) tablet 4 mg  4 mg Oral Daily Tolu Gasca MD   4 mg at 06/11/23 1844    white petrolatum 41 % ointment   Topical (Top) Daily Glenroy Eddy DO   Given at 06/11/23 0818     Outpatient Medications as of 6/12/2023   Medication Sig Dispense Refill    allopurinoL (ZYLOPRIM) 100 MG tablet Take 1 tablet (100 mg total) by mouth every evening. 90 tablet 3    amiodarone (PACERONE) 200 MG Tab Take 1 tablet (200 mg total) by mouth 2 (two) times daily. 60 tablet 11    atorvastatin (LIPITOR) 80 MG tablet Take 1 tablet (80 mg total) by mouth once daily. 90 tablet 3    calcitRIOL (ROCALTROL) 0.5 MCG Cap Take 1 capsule (0.5 mcg total) by mouth once daily. 30 capsule 4    ciclopirox (PENLAC) 8 % Soln Apply 1 application topically nightly.      clopidogreL (PLAVIX) 75 mg tablet Take 1 tablet by mouth once daily.      famotidine (PEPCID) 40 MG tablet Take 1 tablet (40 mg total) by mouth once daily. 90 tablet 3    ferrous sulfate (FEROSUL) 325 mg (65 mg iron) Tab tablet TAKE 1 TABLET BY MOUTH TWICE DAILY 180 tablet 3    fluticasone propionate (FLONASE) 50 mcg/actuation nasal spray 1 spray by Each Nostril route once daily.      insulin aspart U-100 (NOVOLOG) 100 unit/mL (3 mL) InPn pen Inject into the skin 3 (three) times daily with meals.      lisinopriL (PRINIVIL,ZESTRIL) 40 MG tablet Take 40 mg by mouth once daily.      micafungin sodium (MICAFUNGIN 100 MG/100 ML NS, READY TO MIX,) Inject 100 mLs (100 mg total) into the vein once daily. for 14 days 1400 mL 0    mirabegron (MYRBETRIQ) 50 mg Tb24 Take 1 tablet by mouth once daily.      nitroGLYCERIN 0.4 MG/DOSE TL SPRY (NITROLINGUAL) 400 mcg/spray spray Place 1 spray under the tongue every 5 (five) minutes as needed.      omeprazole (PRILOSEC) 20 MG  "capsule Take 20 mg by mouth once daily.      piperacillin sodium/tazobactam (PIPERACILLIN-TAZOBACTAM 3.375 G/100 ML D5W, READY TO MIX,) Inject 100 mLs (3.375 g total) into the vein every 8 (eight) hours. for 14 days 4200 mL 0    traZODone (DESYREL) 50 MG tablet Take 1 tablet by mouth every evening.      triamcinolone acetonide 0.1% (KENALOG) 0.1 % cream APPLY TO THE AFFECTED AREA(S) TWICE DAILY (Patient taking differently: Apply 1 g topically 2 (two) times daily. APPLY TO THE AFFECTED AREA(S) TWICE DAILY) 80 g 3    warfarin (COUMADIN) 5 MG tablet Take 1 tablet (5 mg total) by mouth Daily. 90 tablet 0        Cardiovascular:    Cardiovascular Review: Within definable limits (WDL)    Telemetry: Yes    Rhythm:  NSR     AICD: No      Respiratory:    Oxygen:  95% Room air    CPT/Frequency: N/A    Incentive Spirometer/Frequency: N/A    CPAP/Settings: N/A    BiPAP/Settings:  Bipap at HS     Oxygen Saturation:  95% Room air     Suction Frequency: N/A        Nutrition:      Ht Readings from Last 3 Encounters:   06/06/23 5' 8" (1.727 m)   06/09/23 5' 7.99" (1.727 m)   06/03/23 5' 8" (1.727 m)     Wt Readings from Last 1 Encounters:   06/12/23 0400 89.4 kg (197 lb)   06/06/23 0041 102.7 kg (226 lb 6.6 oz)   06/05/23 1629 107.5 kg (237 lb)       Feeding Status:   Current Diet: 2000 татьяна Dysphagia [facility] soft thin, strick intake and output    Supplements: Landon and Glucerna BID    Tube Feeding: N/A   Flushes: N/A      Integumentary:    Integumentary: Within definable limits (WDL), Special bed, and Surgical Incisions              Altered Skin Integrity 04/28/23 1745 Left Arm (Active)   04/28/23 1745   Altered Skin Integrity Present on Admission - Did Patient arrive to the hospital with altered skin?: yes   Side: Left   Orientation:    Location: Arm   Wound Number:    Is this injury device related?:    Primary Wound Type:    Description of Altered Skin Integrity:    Ankle-Brachial Index:    Pulses:    Removal Indication and " Assessment:    Wound Outcome:    (Retired) Wound Length (cm):    (Retired) Wound Width (cm):    (Retired) Depth (cm):    Wound Description (Comments):    Removal Indications:    Wound Image   06/06/23 1100   Description of Altered Skin Integrity Purple or maroon localized area of discolored intact skin or non-intact skin or a blood-filled blister. 06/01/23 0755   Dressing Appearance Dry;Clean;Intact 06/07/23 1935   Drainage Amount None 06/07/23 1935   Drainage Characteristics/Odor Serous 06/06/23 1940   Appearance Dressing in place, unable to visualize 06/08/23 1925   Tissue loss description Not applicable 05/27/23 0755   Red (%), Wound Tissue Color 100 % 05/05/23 1520   Periwound Area Intact;Ecchymotic 05/15/23 1630   Wound Edges Undefined 05/15/23 1630   Care Cleansed with:;Soap and water 05/15/23 1630   Dressing Changed 06/09/23 1012   Periwound Care Absorptive dressing applied 05/15/23 1630   Number of days: 42            Altered Skin Integrity 04/28/23 1745 Sacral spine Ulceration Full thickness tissue loss. Base is covered by slough and/or eschar in the wound bed (Active)   04/28/23 1745   Altered Skin Integrity Present on Admission - Did Patient arrive to the hospital with altered skin?: yes   Side:    Orientation:    Location: Sacral spine   Wound Number:    Is this injury device related?:    Primary Wound Type: Ulceration   Description of Altered Skin Integrity: Full thickness tissue loss. Base is covered by slough and/or eschar in the wound bed   Ankle-Brachial Index:    Pulses:    Removal Indication and Assessment:    Wound Outcome:    (Retired) Wound Length (cm):    (Retired) Wound Width (cm):    (Retired) Depth (cm):    Wound Description (Comments):    Removal Indications:    Wound Image    06/06/23 1100   Description of Altered Skin Integrity Full thickness tissue loss with exposed bone, tendon, or muscle. Often includes undermining and tunneling. May extend into muscle and/or supporting structures.  06/02/23 1000   Dressing Appearance Intact;Dry 06/06/23 1940   Drainage Amount Small 06/02/23 1000   Drainage Characteristics/Odor Serosanguineous 06/06/23 1940   Appearance Dressing in place, unable to visualize 06/08/23 1925   Tissue loss description Full thickness 06/02/23 1000   Black (%), Wound Tissue Color 10 % 06/02/23 1000   Red (%), Wound Tissue Color 60 % 06/02/23 1000   Yellow (%), Wound Tissue Color 30 % 06/02/23 1000   Periwound Area Denuded;Ecchymotic;Edematous 06/02/23 1000   Wound Edges Open;Undefined 06/02/23 1000   Wound Length (cm) 13.5 cm 06/02/23 1000   Wound Width (cm) 5 cm 06/02/23 1000   Wound Depth (cm) 2.5 cm 06/02/23 1000   Wound Volume (cm^3) 168.75 cm^3 06/02/23 1000   Wound Surface Area (cm^2) 67.5 cm^2 06/02/23 1000   Undermining (depth (cm)/location) 12-6:  4.0 06/02/23 1000   Care Cleansed with:;Sterile normal saline;Wound cleanser 06/07/23 1617   Dressing Changed 06/09/23 1012   Packing packing removed;packed with 05/13/23 0711   Packing Inserted  1 05/13/23 0711   Packing Removed 1 05/13/23 0711   Periwound Care Dry periwound area maintained 06/02/23 1000   Off Loading Other (see comments) 05/13/23 0711   Dressing Change Due 06/06/23 06/02/23 1000   Number of days: 42            Altered Skin Integrity 04/28/23 1745 Left anterior;lateral Foot Ulceration (Active)   04/28/23 1745   Altered Skin Integrity Present on Admission - Did Patient arrive to the hospital with altered skin?: yes   Side: Left   Orientation: anterior;lateral   Location: Foot   Wound Number:    Is this injury device related?:    Primary Wound Type: Ulceration   Description of Altered Skin Integrity:    Ankle-Brachial Index:    Pulses:    Removal Indication and Assessment:    Wound Outcome:    (Retired) Wound Length (cm):    (Retired) Wound Width (cm):    (Retired) Depth (cm):    Wound Description (Comments):    Removal Indications:    Wound Image   06/02/23 1000   Description of Altered Skin Integrity Intact skin  with non-blanchable redness of localized area 06/02/23 1000   Dressing Appearance Dry;Intact 06/06/23 1940   Drainage Amount None 06/02/23 1000   Drainage Characteristics/Odor Serosanguineous 06/01/23 0755   Appearance Dressing in place, unable to visualize 06/08/23 1925   Tissue loss description Partial thickness 05/31/23 0715   Red (%), Wound Tissue Color 100 % 05/26/23 1715   Yellow (%), Wound Tissue Color 50 % 04/28/23 1745   Periwound Area Intact;Dry 05/26/23 1715   Wound Edges Irregular 05/26/23 1715   Wound Length (cm) 0.5 cm 05/26/23 1715   Wound Width (cm) 0.5 cm 05/26/23 1715   Wound Depth (cm) 0.1 cm 05/26/23 1715   Wound Volume (cm^3) 0.025 cm^3 05/26/23 1715   Wound Surface Area (cm^2) 0.25 cm^2 05/26/23 1715   Care Cleansed with:;Antimicrobial agent;Wound cleanser 06/02/23 1000   Dressing Changed 06/09/23 1012   Periwound Care Dry periwound area maintained 06/02/23 1000   Number of days: 42            Altered Skin Integrity 04/28/23 1745 Right distal Toe, first Ulceration Full thickness tissue loss. Base is covered by slough and/or eschar in the wound bed (Active)   04/28/23 1745   Altered Skin Integrity Present on Admission - Did Patient arrive to the hospital with altered skin?: yes   Side: Right   Orientation: distal   Location: Toe, first   Wound Number:    Is this injury device related?:    Primary Wound Type: Ulceration   Description of Altered Skin Integrity: Full thickness tissue loss. Base is covered by slough and/or eschar in the wound bed   Ankle-Brachial Index:    Pulses:    Removal Indication and Assessment:    Wound Outcome:    (Retired) Wound Length (cm):    (Retired) Wound Width (cm):    (Retired) Depth (cm):    Wound Description (Comments):    Removal Indications:    Wound Image     06/06/23 1100   Description of Altered Skin Integrity Full thickness tissue loss. Base is covered by slough and/or eschar in the wound bed 06/02/23 1000   Dressing Appearance Open to air 06/08/23 1925    Drainage Amount None 06/02/23 1000   Appearance Red;Black;Eschar 06/02/23 1000   Tissue loss description Full thickness 06/02/23 1000   Black (%), Wound Tissue Color 50 % 06/02/23 1000   Red (%), Wound Tissue Color 50 % 06/02/23 1000   Periwound Area Intact;Dry 06/02/23 1000   Wound Edges Irregular 06/02/23 1000   Wound Length (cm) 1 cm 06/02/23 1000   Wound Width (cm) 0.5 cm 06/02/23 1000   Wound Depth (cm) 0.1 cm 06/02/23 1000   Wound Volume (cm^3) 0.05 cm^3 06/02/23 1000   Wound Surface Area (cm^2) 0.5 cm^2 06/02/23 1000   Care Cleansed with:;Antimicrobial agent;Wound cleanser;Applied:;Povidone iodine 06/02/23 1000   Dressing Changed 06/09/23 1012   Periwound Care Dry periwound area maintained 06/02/23 1000   Number of days: 42            Altered Skin Integrity 05/02/23 1600 Right lateral Thigh Ulceration (Active)   05/02/23 1600   Altered Skin Integrity Present on Admission - Did Patient arrive to the hospital with altered skin?:    Side: Right   Orientation: lateral   Location: Thigh   Wound Number:    Is this injury device related?: Yes   Primary Wound Type: Ulceration   Description of Altered Skin Integrity:    Ankle-Brachial Index:    Pulses:    Removal Indication and Assessment:    Wound Outcome:    (Retired) Wound Length (cm):    (Retired) Wound Width (cm):    (Retired) Depth (cm):    Wound Description (Comments):    Removal Indications:    Wound Image   06/02/23 1000   Description of Altered Skin Integrity Full thickness tissue loss. Subcutaneous fat may be visible but bone, tendon or muscle are not exposed 06/02/23 1000   Dressing Appearance Dry;Intact;Clean 06/08/23 1925   Drainage Amount None 06/08/23 1925   Drainage Characteristics/Odor Serosanguineous 06/01/23 0755   Appearance Dressing in place, unable to visualize 06/08/23 1925   Tissue loss description Full thickness 06/02/23 1000   Red (%), Wound Tissue Color 100 % 06/02/23 1000   Yellow (%), Wound Tissue Color 10 % 05/15/23 1630   Periwound  Area Intact;Dry;[place] 06/02/23 1000   Wound Edges Rolled/closed 06/02/23 1000   Wound Length (cm) 0.1 cm 06/02/23 1000   Wound Width (cm) 0.1 cm 06/02/23 1000   Wound Depth (cm) 0.1 cm 06/02/23 1000   Wound Volume (cm^3) 0.001 cm^3 06/02/23 1000   Wound Surface Area (cm^2) 0.01 cm^2 06/02/23 1000   Care Cleansed with:;Antimicrobial agent;Wound cleanser 06/02/23 1000   Dressing Foam 06/02/23 1000   Periwound Care Dry periwound area maintained 06/02/23 1000   Number of days: 38            Altered Skin Integrity 05/02/23 1600 Penis (Active)   05/02/23 1600   Altered Skin Integrity Present on Admission - Did Patient arrive to the hospital with altered skin?:    Side:    Orientation:    Location: Penis   Wound Number:    Is this injury device related?: Yes   Primary Wound Type:    Description of Altered Skin Integrity:    Ankle-Brachial Index:    Pulses:    Removal Indication and Assessment:    Wound Outcome:    (Retired) Wound Length (cm):    (Retired) Wound Width (cm):    (Retired) Depth (cm):    Wound Description (Comments):    Removal Indications:    Wound Image   06/06/23 1100   Description of Altered Skin Integrity Full thickness tissue loss. Subcutaneous fat may be visible but bone, tendon or muscle are not exposed 06/01/23 0755   Dressing Appearance Open to air 06/07/23 1935   Drainage Amount None 06/07/23 1935   Drainage Characteristics/Odor Green;Tan;Malodorous;Bleeding controlled 06/05/23 0800   Appearance Pink;Red 06/07/23 1617   Tissue loss description Full thickness 06/01/23 0755   Red (%), Wound Tissue Color 50 % 05/02/23 1600   Yellow (%), Wound Tissue Color 50 % 05/02/23 1600   Periwound Area Excoriated 05/15/23 1630   Wound Edges Irregular 06/05/23 0800   Care Cleansed with:;Soap and water;Applied:;Other (see comments) 06/07/23 1617   Periwound Care Dry periwound area maintained 05/02/23 1600   Number of days: 38            Altered Skin Integrity 05/05/23 1415 posterior Scrotum Traumatic (Active)    05/05/23 1415   Altered Skin Integrity Present on Admission - Did Patient arrive to the hospital with altered skin?:    Side:    Orientation: posterior   Location: Scrotum   Wound Number:    Is this injury device related?:    Primary Wound Type: Traumatic   Description of Altered Skin Integrity:    Ankle-Brachial Index:    Pulses:    Removal Indication and Assessment:    Wound Outcome:    (Retired) Wound Length (cm):    (Retired) Wound Width (cm):    (Retired) Depth (cm):    Wound Description (Comments):    Removal Indications:    Wound Image    06/06/23 1100   Description of Altered Skin Integrity Partial thickness tissue loss. Shallow open ulcer with a red or pink wound bed, without slough. Intact or Open/Ruptured Serum-filled blister. 06/02/23 1000   Dressing Appearance Dry;Intact;Clean 06/08/23 1925   Drainage Amount None 06/08/23 1925   Drainage Characteristics/Odor Serosanguineous 06/07/23 1617   Appearance Dressing in place, unable to visualize 06/08/23 1925   Tissue loss description Full thickness 06/02/23 1000   Black (%), Wound Tissue Color 10 % 05/26/23 1715   Red (%), Wound Tissue Color 80 % 06/02/23 1000   Yellow (%), Wound Tissue Color 10 % 06/02/23 1000   Periwound Area Ecchymotic;Excoriated;Moist;Redness 06/02/23 1000   Wound Edges Irregular 06/02/23 1000   Wound Length (cm) 7 cm 06/02/23 1000   Wound Width (cm) 4 cm 06/02/23 1000   Wound Depth (cm) 0.3 cm 06/02/23 1000   Wound Volume (cm^3) 8.4 cm^3 06/02/23 1000   Wound Surface Area (cm^2) 28 cm^2 06/02/23 1000   Care Cleansed with:;Sterile normal saline;Wound cleanser 06/07/23 1617   Dressing Removed 06/09/23 1012   Packing packed with 06/07/23 1617   Periwound Care Moisture barrier applied;Dry periwound area maintained 06/02/23 1000   Number of days: 35            Altered Skin Integrity 05/26/23 1715 Right posterior Heel Shearing (Active)   05/26/23 1715   Altered Skin Integrity Present on Admission - Did Patient arrive to the hospital with  altered skin?:    Side: Right   Orientation: posterior   Location: Heel   Wound Number:    Is this injury device related?:    Primary Wound Type: Shearing   Description of Altered Skin Integrity:    Ankle-Brachial Index:    Pulses:    Removal Indication and Assessment:    Wound Outcome:    (Retired) Wound Length (cm):    (Retired) Wound Width (cm):    (Retired) Depth (cm):    Wound Description (Comments):    Removal Indications:    Wound Image   06/06/23 1100   Description of Altered Skin Integrity Full thickness tissue loss. Subcutaneous fat may be visible but bone, tendon or muscle are not exposed 06/02/23 1000   Dressing Appearance Dry;Intact;Clean 06/08/23 1925   Drainage Amount Scant 06/08/23 1925   Drainage Characteristics/Odor Creamy;Serous 06/02/23 1000   Appearance Dressing in place, unable to visualize 06/08/23 1925   Tissue loss description Full thickness 06/02/23 1000   Red (%), Wound Tissue Color 10 % 06/02/23 1000   Yellow (%), Wound Tissue Color 90 % 06/02/23 1000   Periwound Area Ecchymotic;Excoriated;Redness 06/02/23 1000   Wound Edges Irregular 06/02/23 1000   Wound Length (cm) 3 cm 06/02/23 1000   Wound Width (cm) 3 cm 06/02/23 1000   Wound Depth (cm) 0.2 cm 06/02/23 1000   Wound Volume (cm^3) 1.8 cm^3 06/02/23 1000   Wound Surface Area (cm^2) 9 cm^2 06/02/23 1000   Care Cleansed with:;Antimicrobial agent;Wound cleanser 06/02/23 1000   Dressing Changed 06/09/23 1012   Periwound Care Dry periwound area maintained 06/02/23 1000   Number of days: 14            Altered Skin Integrity 05/26/23 1715 Left posterior Heel Shearing Full thickness tissue loss. Base is covered by slough and/or eschar in the wound bed (Active)   05/26/23 1715   Altered Skin Integrity Present on Admission - Did Patient arrive to the hospital with altered skin?:    Side: Left   Orientation: posterior   Location: Heel   Wound Number:    Is this injury device related?:    Primary Wound Type: Shearing   Description of Altered Skin  Integrity: Full thickness tissue loss. Base is covered by slough and/or eschar in the wound bed   Ankle-Brachial Index:    Pulses:    Removal Indication and Assessment:    Wound Outcome:    (Retired) Wound Length (cm):    (Retired) Wound Width (cm):    (Retired) Depth (cm):    Wound Description (Comments):    Removal Indications:    Wound Image    06/06/23 1100   Description of Altered Skin Integrity Purple or maroon localized area of discolored intact skin or non-intact skin or a blood-filled blister. 06/02/23 1000   Dressing Appearance Dry;Intact 06/06/23 1940   Drainage Amount None 06/02/23 1000   Appearance Dressing in place, unable to visualize 06/08/23 1925   Periwound Area Intact;Ecchymotic;Redness 06/02/23 1000   Wound Edges Irregular 06/02/23 1000   Wound Length (cm) 0.5 cm 06/02/23 1000   Wound Width (cm) 1 cm 06/02/23 1000   Wound Depth (cm) 0 cm 06/02/23 1000   Wound Volume (cm^3) 0 cm^3 06/02/23 1000   Wound Surface Area (cm^2) 0.5 cm^2 06/02/23 1000   Care Cleansed with:;Antimicrobial agent;Wound cleanser 06/02/23 1000   Dressing Changed 06/09/23 1012   Periwound Care Dry periwound area maintained 06/02/23 1000   Number of days: 14            Altered Skin Integrity 05/26/23 1715 Right dorsal Foot Abrasion(s) (Active)   05/26/23 1715   Altered Skin Integrity Present on Admission - Did Patient arrive to the hospital with altered skin?:    Side: Right   Orientation: dorsal   Location: Foot   Wound Number:    Is this injury device related?:    Primary Wound Type: Abrasion(s)   Description of Altered Skin Integrity:    Ankle-Brachial Index:    Pulses:    Removal Indication and Assessment:    Wound Outcome:    (Retired) Wound Length (cm):    (Retired) Wound Width (cm):    (Retired) Depth (cm):    Wound Description (Comments):    Removal Indications:    Wound Image   06/02/23 1000   Description of Altered Skin Integrity Partial thickness tissue loss. Shallow open ulcer with a red or pink wound bed, without  slough. Intact or Open/Ruptured Serum-filled blister. 06/02/23 1000   Dressing Appearance Open to air 06/06/23 1940   Drainage Amount Scant 06/02/23 1000   Drainage Characteristics/Odor Serosanguineous 06/02/23 1000   Appearance Dressing in place, unable to visualize 06/08/23 1925   Tissue loss description Partial thickness 06/02/23 1000   Red (%), Wound Tissue Color 100 % 06/02/23 1000   Periwound Area Intact;Dry;[place] 06/02/23 1000   Wound Length (cm) 0.2 cm 06/02/23 1000   Wound Width (cm) 0.2 cm 06/02/23 1000   Wound Depth (cm) 0.1 cm 06/02/23 1000   Wound Volume (cm^3) 0.004 cm^3 06/02/23 1000   Wound Surface Area (cm^2) 0.04 cm^2 06/02/23 1000   Tunneling (depth (cm)/location) 1 06/02/23 1000   Care Cleansed with:;Antimicrobial agent;Wound cleanser 06/02/23 1000   Dressing Changed 06/09/23 1012   Periwound Care Dry periwound area maintained 06/02/23 1000   Number of days: 14         Musculoskeletal:    Transfer assist: Activity did not occur    Weight Bearing Status: Full    Comments: N/A    ADL Assist: Activity did not occur    Special Equipment: N/A    Radiology:    Radiology (Last 168 hours)               06/09 0923 Echo       06/06 0906 CT Abdomen Pelvis  Without Contrast       06/06 0004 US Retroperitoneal Complete       06/05 2037 CTA Chest Non-Coronary (PE Studies)       06/05 2035 CT Abdomen Pelvis With Contrast       06/05 1829 X-Ray Ankle Complete Right       06/05 1649 X-Ray Chest 1 View                Echo  · The left ventricle is normal in size with moderate concentric   hypertrophy and low normal systolic function.  · The estimated ejection fraction is 50%.  · No clear evidence of endocarditis. If clinical suspicion persists,   consider LATRICIA.         Lab/Cultures:    BUN   Date Value Ref Range Status   06/12/2023 51 (H) 8 - 23 mg/dL Final   06/11/2023 47 (H) 8 - 23 mg/dL Final     Creatinine   Date Value Ref Range Status   06/12/2023 1.3 0.5 - 1.4 mg/dL Final   06/11/2023 1.5 (H) 0.5 - 1.4 mg/dL  Final     WBC   Date Value Ref Range Status   06/12/2023 8.55 3.90 - 12.70 K/uL Final   06/11/2023 8.59 3.90 - 12.70 K/uL Final      Blood Culture, Routine   Date Value Ref Range Status   06/08/2023 No Growth to date  Preliminary   06/08/2023 No Growth to date  Preliminary   06/08/2023 No Growth to date  Preliminary   06/08/2023 No Growth to date  Preliminary     Urine Culture, Routine   Date Value Ref Range Status   06/05/2023 No growth  Final     No results for input(s): PH, PCO2, PO2, HCO3, POCSATURATED, BE in the last 72 hours.

## 2023-06-09 NOTE — PLAN OF CARE
06/09/23 1424   Post-Acute Status   Post-Acute Authorization Placement   Post-Acute Placement Status Pending payor review/awaiting authorization (if required)     [facility] now needed related to id requiring micagfunin daily and Zosyn q 8.  Med cost too high for SNF, auth submitted for [facility]

## 2023-06-09 NOTE — PT/OT/SLP PROGRESS
Occupational Therapy   Treatment    Name: Richard Bustos  MRN: [id number]  Admitting Diagnosis:  Hematuria       Recommendations:     Discharge Recommendations: other (see comments) (Return to LTAC)  Discharge Equipment Recommendations:     Barriers to discharge:  Decreased caregiver support    Assessment:     Richard Bustos is a 77 y.o. male with a medical diagnosis of Hematuria.  He presents with severe physical deconditioning. Patient participated in BUE/BLE therex bed level with maximal assistance. Performance deficits affecting function are weakness, impaired endurance, impaired self care skills, impaired functional mobility, gait instability, impaired balance, decreased upper extremity function, decreased lower extremity function, decreased safety awareness, impaired cardiopulmonary response to activity.     Rehab Prognosis:  Fair; patient would benefit from acute skilled OT services to address these deficits and reach maximum level of function.       Plan:     Patient to be seen 3 x/week to address the above listed problems via self-care/home management, therapeutic activities, therapeutic exercises  Plan of Care Expires: 07/05/23  Plan of Care Reviewed with: patient    Subjective     Chief Complaint: severe physical deconditioning  Patient/Family Comments/goals: improved functional mobility and ADL independence.  Pain/Comfort:  Pain Rating 1: 0/10  Pain Rating Post-Intervention 1: 0/10    Objective:     Communicated with: nurse prior to session.  Patient found HOB elevated with colostomy, mcdaniels catheter upon OT entry to room.    General Precautions: Standard, contact, fall    Orthopedic Precautions:RLE non weight bearing  Braces: N/A  Respiratory Status: Room air     Occupational Performance:     BUE/BLE Therex:    Performed BUE/BLE therex x10 reps with maximal assistance bed level.     [facility] 6 Click ADL:      Treatment & Education:  Patient educated to ask spouse to assist with BUE/BLE AAROM several times during  the day.    Patient left HOB elevated with all lines intact, call button in reach, and bed alarm on    GOALS:   Multidisciplinary Problems       Occupational Therapy Goals          Problem: Occupational Therapy    Goal Priority Disciplines Outcome Interventions   Occupational Therapy Goal     OT, PT/OT Ongoing, Progressing    Description: Goals to be met by: 7/5/2023     Patient will increase functional independence with ADLs by performing:    Feeding with Supervision while seated upright in bed.  Grooming while in bed with Supervision.  Supine to sit with Moderate Assistance.  Bilateral Upper extremity exercise program, with supervision.                         Time Tracking:     OT Date of Treatment: 06/09/23  OT Start Time: 1142  OT Stop Time: 1159  OT Total Time (min): 17 min    Billable Minutes:Therapeutic Exercise 17    OT/MARZENA: OT          6/9/2023

## 2023-06-09 NOTE — SUBJECTIVE & OBJECTIVE
Interval History:   As per subjective     Review of Systems  Objective:     Vital Signs (Most Recent):  Temp: 98 °F (36.7 °C) (06/09/23 1300)  Pulse: 72 (06/09/23 1300)  Resp: 16 (06/09/23 1300)  BP: 117/71 (06/09/23 1300)  SpO2: 95 % (06/09/23 1300) Vital Signs (24h Range):  Temp:  [97.8 °F (36.6 °C)-98.4 °F (36.9 °C)] 98 °F (36.7 °C)  Pulse:  [66-85] 72  Resp:  [16-18] 16  SpO2:  [92 %-97 %] 95 %  BP: (100-131)/(55-87) 117/71     Weight: 102.7 kg (226 lb 6.6 oz)  Body mass index is 34.43 kg/m².    Intake/Output Summary (Last 24 hours) at 6/9/2023 1557  Last data filed at 6/9/2023 1347  Gross per 24 hour   Intake 800 ml   Output 1300 ml   Net -500 ml         Physical Exam  Constitutional:       General: He is not in acute distress.     Appearance: He is well-developed.   HENT:      Head: Normocephalic.   Eyes:      Pupils: Pupils are equal, round, and reactive to light.   Cardiovascular:      Rate and Rhythm: Normal rate and regular rhythm.      Pulses: Normal pulses.   Pulmonary:      Effort: Pulmonary effort is normal. No respiratory distress.   Abdominal:      General: Abdomen is flat. There is no distension.      Tenderness: There is no abdominal tenderness.   Musculoskeletal:      Cervical back: Neck supple.   Skin:     Findings: No rash.   Neurological:      Mental Status: He is alert and oriented to person, place, and time.           Significant Labs: All pertinent labs within the past 24 hours have been reviewed.  CBC:   Recent Labs   Lab 06/08/23  0452 06/09/23  0201   WBC 9.01 9.05   HGB 7.8* 7.7*   HCT 23.0* 23.4*    376     CMP:   Recent Labs   Lab 06/08/23  0452 06/09/23  0201    133*   K 4.4 4.2    98   CO2 28 26    89   BUN 35* 45*   CREATININE 1.3 1.5*   CALCIUM 9.0 8.5*   PROT 5.6* 5.7*   ALBUMIN 2.0* 1.9*   BILITOT 0.7 0.5   ALKPHOS 218* 260*   AST 42* 70*   ALT 36 46*   ANIONGAP 8 9     Cardiac Markers: No results for input(s): CKMB, MYOGLOBIN, BNP, TROPISTAT in the last  48 hours.    Significant Imaging: I have reviewed all pertinent imaging results/findings within the past 24 hours.

## 2023-06-09 NOTE — PLAN OF CARE
06/09/23 0742   Patient Assessment/Suction   Level of Consciousness (AVPU) alert   Respiratory Effort Normal;Unlabored   Expansion/Accessory Muscles/Retractions no use of accessory muscles   All Lung Fields Breath Sounds diminished   PRE-TX-O2   Device (Oxygen Therapy) room air   SpO2 96 %   Pulse Oximetry Type Intermittent   $ Pulse Oximetry - Multiple Charge Pulse Oximetry - Multiple   Pulse 70   Resp 16   Aerosol Therapy   $ Aerosol Therapy Charges PRN treatment not required   Preset CPAP/BiPAP Settings   Mode Of Delivery BiPAP;Standby   $ CPAP/BiPAP Daily Charge BiPAP/CPAP Daily   Education   $ Education BiPAP;15 min

## 2023-06-09 NOTE — PROGRESS NOTES
Consult Note  Infectious Disease    Reason for Consult:  ID judy    HPI: Richard Bustos is a 77 y.o. male known to our service for treatment of stage 4 sacral decubitus, surgically debrided 4/20 with diverting colostomy 4/25 then treated at [facility] through 5/31 then to TCU in same facility. He completed 4+ weeks of broad spectrum antibiotics, adjusted part way through for new tissue cultures with pseudomonas/enterobacter, with history of long term augmentin(6 weeks prior to that admission) for possible osteomyelitis of foot ulcer. He was sent to the ED for concern of Halle's gangrene as scrotal wounds were felt to be worse. He has been CT scanned, seen by wound care and general surgery. Numerous wound photos were reviewed and d/w wound care RN. Wound care MD intends to debride a bit tomorrow and wound vac will be replaced to sacrum.  He submitted superficial swab cultures of the scrotum and sacrum. Additionally he has had gross hematuria the entire time he as at LTAC and SNF, worse with elevated INR,  on coumadin for history of MTHFR mutation, history of prostate cancer, status unknown.     6/7: Interim reviewed.  Afebrile, urine culture negative, so far.  wound cultures have Gram-negative rods as expected.  Ruelas was irrigated and improved in quality.  Urology consult pending.  Hemoglobin is stable at 7.5.  Discussed with his family that he has been in remission from prostate cancer for some time.  They do not know the etiology of the hematuria but comment that he has renal cysts.  He tells me that the catheter was changed today but it actually was changed on the 5th.    6/8: interim reviewed. Blood cultures drawn on 6/5 have grown candida tropicalis and glabrata. Diflucan was begun last night. He has no fever. His picc line is over a month old. His urine culture is negative. He has no new complaints and is at his recent baseline. No change in vision. No peripheral signs of candidemia. Wound cultures notes, but  represent surface colonization.   6/9: Interim reviewed.  Blood cultures from yesterday are negative so far in the line tip is negative at less than 24 hours.  Pseudomonas was added to his blood cultures today.  The cultures taken from the surface of his sacrum and scrotum both of Pseudomonas.  His sacral bone was debrided a bit by Dr. Eddy today.  Zosyn was added.  Transthoracic echo was negative for vegetations    Review of patient's allergies indicates:   Allergen Reactions    Donepezil Other (See Comments)     AMS    Bactroban [mupirocin calcium] Blisters     Causes Blisters    Shellfish containing products Other (See Comments)     Other reaction(s): Gout  OYSTERS     Past Medical History:   Diagnosis Date    Anemia     Anemia of other chronic disease 09/13/2017    Anemia, chronic renal failure 09/13/2017    Anemia, unspecified 09/13/2017    Anticoagulant long-term use     Aorta aneurysm     Arthritis     Atrial fibrillation     Bacteremia due to Streptococcus 01/08/2022    CAD (coronary artery disease)     CHF (congestive heart failure)     Chronic kidney disease     Clotting disorder 09/13/2017    Colon polyp     Dementia     Diabetes mellitus     not on meds    Diabetes mellitus type II     Diverticulosis     Elevated PSA     Encounter for blood transfusion     Former smoker     General anesthetics causing adverse effect in therapeutic use     TROUBLE COMING OUT OF ANESTHESIA WITH GASTRIC BYPASS    GERD (gastroesophageal reflux disease)     Gout     Hx of colonic polyps     Hypercoagulable state     Hyperlipidemia     Hypertension     MI, old 2010    MTHFR mutation     Myocardial infarction     9/2010    Osteomyelitis of ankle or foot 03/09/2017    Prostate cancer 06/2016    Sleep apnea     Squamous cell carcinoma 01/23/2018    Left helix, imiquimod    Supratherapeutic INR 4/19/2023    Venous stasis     Chronic     Past Surgical History:   Procedure Laterality Date    ABDOMINAL SURGERY      CARDIAC SURGERY       3 STENTS     CAUDAL EPIDURAL STEROID INJECTION N/A 6/22/2020    Procedure: INJECTION, STEROID, SPINE, EPIDURAL, CAUDAL;  Surgeon: Evangelist Bose MD;  Location: [facility] OR;  Service: Pain Management;  Laterality: N/A;    COLONOSCOPY  02/2011    diverticulosis with diverticula with clot, no active bleeding    COLONOSCOPY N/A 8/24/2016    Procedure: COLONOSCOPY;  Surgeon: Saroj Borjas MD;  Location: [facility] ENDO;  Service: Endoscopy;  Laterality: N/A;    COLONOSCOPY N/A 11/18/2020    Procedure: COLONOSCOPY;  Surgeon: Saroj Borjas MD;  Location: [facility] ENDO;  Service: Endoscopy;  Laterality: N/A;    COLONOSCOPY N/A 10/27/2021    Procedure: COLONOSCOPY;  Surgeon: Saroj Borjas MD;  Location: [facility] ENDO;  Service: Endoscopy;  Laterality: N/A;    CREATION, COLOSTOMY, LAPAROSCOPIC N/A 4/25/2023    Procedure: CREATION, COLOSTOMY, LAPAROSCOPIC;  Surgeon: Mark Martinez Jr., MD;  Location: [facility] OR;  Service: General;  Laterality: N/A;    DEBRIDEMENT OF SACRAL WOUND N/A 4/20/2023    Procedure: DEBRIDEMENT, WOUND, SACRUM;  Surgeon: Mark Martinez Jr., MD;  Location: [facility] OR;  Service: General;  Laterality: N/A;    EPIDURAL STEROID INJECTION INTO LUMBAR SPINE N/A 6/22/2020    Procedure: Injection-steroid-epidural-lumbar;  Surgeon: Evangelist Bose MD;  Location: [facility] OR;  Service: Pain Management;  Laterality: N/A;  L3 L4 L5 S1    EPIDURAL STEROID INJECTION INTO LUMBAR SPINE N/A 9/21/2020    Procedure: Injection-steroid-epidural-lumbar;  Surgeon: Evangelist Bose MD;  Location: [facility] OR;  Service: Pain Management;  Laterality: N/A;  L5-S1    FUSION, SPINE, CERVICAL N/A 3/24/2023    Procedure: FUSION, SPINE, CERVICAL;  Surgeon: Kike Kc DO;  Location: [facility];  Service: Neurosurgery;  Laterality: N/A;  posterior cervical decompression/fusion C3-T1    GASTRIC BYPASS  2/5/2008    IVC FILTER RETRIEVAL      JOINT REPLACEMENT  1996 and 2001    bi-lat hip replacement/Rt Hip and Lt Hip    RADIOFREQUENCY  ABLATION OF LUMBAR MEDIAL BRANCH NERVE AT SINGLE LEVEL Bilateral 1/10/2020    Procedure: Radiofrequency Ablation, Nerve, Spinal, Lumbar, Medial Branch, 1 Level;  Surgeon: Evangelist Bose MD;  Location: [facility] OR;  Service: Pain Management;  Laterality: Bilateral;  L3,L4,L5    RADIOFREQUENCY ABLATION OF LUMBAR MEDIAL BRANCH NERVE AT SINGLE LEVEL Bilateral 11/23/2021    Procedure: Radiofrequency Ablation, Nerve, Spinal, Lumbar, Medial Branch, Bilateral L 3,4,5;  Surgeon: Nile Causey MD;  Location: [facility] OR;  Service: Pain Management;  Laterality: Bilateral;    ROTATOR CUFF REPAIR      Rt shoulder    Stents  8/18/2010    x 3    UPPER GASTROINTESTINAL ENDOSCOPY  02/2011         EXAM & DIAGNOSTICS REVIEWED:   Vitals:     Temp:  [97.8 °F (36.6 °C)-98.4 °F (36.9 °C)]   Temp: 97.8 °F (36.6 °C) (06/09/23 0814)  Pulse: 76 (06/09/23 0814)  Resp: 16 (06/09/23 0814)  BP: 111/62 (06/09/23 0814)  SpO2: (!) 92 % (06/09/23 0814)    Intake/Output Summary (Last 24 hours) at 6/9/2023 1315  Last data filed at 6/9/2023 1012  Gross per 24 hour   Intake 600 ml   Output 1300 ml   Net -700 ml      General:  In NAD. Alert and attentive, cooperative, comfortable and nontoxic  Eyes:  Anicteric,   EOMI , resolving facial ecchymosis, no scleral or conjunctival petechia  ENT:  No ulcers, exudates, thrush, nares patent,    Neck:  supple,   Lungs: Breathing comfortably, clear lungs  Heart:  RRR,   no murmur  Abd:  Soft, obese, LLQ ostomy with yellow stool, NT, ND,    :   Ruelas, with pink urine, Ruelas changed 6/5  Musc:  Joints without effusion, swelling, erythema, synovitis, with generalized muscle wasting.   Skin:  No rashes.    Neuro:             Alert, attentive, speech fluent, face symmetric, moves all extremities, but not with strength  not Ambulatory and profoundly weak in general.  History is unreliable  Psych: Calm, pleasant, cooperative  Lymphatic:      Extrem: Mild extremity edema, without cellulitis,      VAD:   Picc left arm 4/26, no  external sign of infection, removed 6/8.  Right upper extremity midline in place    Isolation:  contact  Wound:   Erosion on dorsal penis    Base of scrotum    sacrum     Small amount of bone palpable per wound care nurse(this is not new)               General Labs reviewed:  Recent Labs   Lab 06/07/23  0440 06/08/23  0452 06/09/23  0201   WBC 9.97 9.01 9.05   HGB 7.5* 7.8* 7.7*   HCT 22.4* 23.0* 23.4*    363 376       Recent Labs   Lab 06/07/23  0440 06/08/23  0452 06/09/23  0201    141 133*   K 4.0 4.4 4.2    105 98   CO2 27 28 26   BUN 33* 35* 45*   CREATININE 1.2 1.3 1.5*   CALCIUM 8.7 9.0 8.5*   PROT 5.6* 5.6* 5.7*   BILITOT 0.7 0.7 0.5   ALKPHOS 204* 218* 260*   ALT 35 36 46*   AST 35 42* 70*     Recent Labs   Lab 06/05/23  1643   CRP 6.79*     Procal 0/73  Lactic normal      Micro:  Microbiology Results (last 7 days)       Procedure Component Value Units Date/Time    Aerobic culture [[id number]]  (Abnormal)  (Susceptibility) Collected: 06/06/23 1059    Order Status: Completed Specimen: Wound from Sacrum Updated: 06/09/23 1302     Aerobic Bacterial Culture PSEUDOMONAS AERUGINOSA   Results called to and read back by Nile Kong RN-[address] [place] [address];  06/09/2023    13:02 CJD      Narrative:      Sacrum    Blood culture x two cultures. Draw prior to antibiotics [[id number]]  (Abnormal) Collected: 06/05/23 1644    Order Status: Completed Specimen: Blood from Line, PICC Left Cephalic Updated: 06/09/23 0754     Blood Culture, Routine Gram stain miroslava bottle: yeast      Results called to and read back by: Melonie Mason RN on [address]      PSEUDOMONAS AERUGINOSA  susceptibility pending      Narrative:      Aerobic and anaerobic    Culture, Anaerobic [[id number]] Collected: 06/06/23 1059    Order Status: Completed Specimen: Wound from Sacrum Updated: 06/09/23 0742     Anaerobic Culture No anaerobes isolated    Narrative:      Sacrum    Blood culture [[id number]] Collected: 06/08/23 1931    Order Status: Completed  Specimen: Blood from Line, PICC Left Basilic Updated: 06/09/23 0317     Blood Culture, Routine No Growth to date    Narrative:      Draw from picc line    IV catheter culture [[id number]] Collected: 06/09/23 0201    Order Status: Sent Specimen: Catheter Tip, PICC Updated: 06/09/23 0213    Blood culture x two cultures. Draw prior to antibiotics [[id number]] Collected: 06/05/23 1643    Order Status: Completed Specimen: Blood from Line, PICC Left Basilic Updated: 06/08/23 1832     Blood Culture, Routine No Growth to date      No Growth to date      No Growth to date      No Growth to date    Narrative:      Aerobic and anaerobic    Culture, Anaerobic [[id number]] Collected: 06/06/23 1057    Order Status: Completed Specimen: Wound from Perineum Updated: 06/08/23 1455     Anaerobic Culture No anaerobes isolated    Narrative:      Wound of the perineum/scrotum    Aerobic culture [[id number]]  (Abnormal)  (Susceptibility) Collected: 06/06/23 1057    Order Status: Completed Specimen: Wound from Perineum Updated: 06/08/23 0937     Aerobic Bacterial Culture PSEUDOMONAS AERUGINOSA  Few      Narrative:      Wound of the perineum/scrotum    Urine culture [[id number]] Collected: 06/05/23 1657    Order Status: Completed Specimen: Urine Updated: 06/08/23 0816     Urine Culture, Routine No growth    Narrative:      Specimen Source->Urine    Rapid Organism ID by PCR (from Blood culture) [[id number]]  (Abnormal) Collected: 06/05/23 1644    Order Status: Completed Updated: 06/07/23 2141     Enterococcus faecalis Not Detected     Enterococcus faecium Not Detected     Listeria monocytogenes Not Detected     Staphylococcus spp. Not Detected     Staphylococcus aureus Not Detected     Staphylococcus epidermidis Not Detected     Staphylococcus lugdunensis Not Detected     Streptococcus species Not Detected     Streptococcus agalactiae Not Detected     Streptococcus pneumoniae Not Detected     Streptococcus pyogenes Not Detected     Acinetobacter  calcoaceticus/baumannii complex Not Detected     Bacteroides fragilis Not Detected     Enterobacterales Not Detected     Enterobacter cloacae complex Not Detected     Escherichia coli Not Detected     Klebsiella aerogenes Not Detected     Klebsiella oxytoca Not Detected     Klebsiella pneumoniae group Not Detected     Proteus Not Detected     Salmonella sp Not Detected     Serratia marcescens Not Detected     Haemophilus influenzae Not Detected     Neisseria meningtidis Not Detected     Pseudomonas aeruginosa Not Detected     Stenotrophomonas maltophilia Not Detected     Candida albicans Not Detected     Candida auris Not Detected     Mary glabrata Detected     Mary krusei Not Detected     Candida parapsilosis Not Detected     Candida tropicalis Detected     Cryptococcus neoformans/gattii Not Detected     CTX-M (ESBL ) Test not applicable     IMP (Carbapenem resistant) Test not applicable     KPC resistance gene (Carbapenem resistant) Test not applicable     mcr-1  Test not applicable     mec A/C  Test not applicable     mec A/C and MREJ (MRSA) gene Test not applicable     NDM (Carbapenem resistant) Test not applicable     OXA-48-like (Carbapenem resistant) Test not applicable     van A/B (VRE gene) Test not applicable     VIM (Carbapenem resistant) Test not applicable    Narrative:      Aerobic and anaerobic    Aerobic culture [[id number]]     Order Status: Canceled Specimen: Wound from Sacrum             Imaging Reviewed:   CXR   CT abd/pelvis    Cardiology:  TTE  The left ventricle is normal in size with moderate concentric hypertrophy and low normal systolic function.  The estimated ejection fraction is 50%.  No clear evidence of endocarditis. If clinical suspicion persists, consider LATRICIA.    IMPRESSION & PLAN   1. S/p treatment for acute osteomyelitis of sacrum   Bone still exposed  2. Numerous other wounds, including erosions of base of scrotum with no clinical evidence of nikos's gangrene.   Superficial cultures of the wound are growing Pseudomonas and a positive culture from the surface would be expected    3. Persistent gross hematuria, for many weeks, on coumadin for MTHFR   INR down  4. Numerous other co-morbidities  5.  Candidemia, with Pseudomonas as well, blood cultures drawn 6/5, PICC line removed 6/8    Recommendations:   Continue Mycamine, 14 days assuming negative repeat blood cultures  Continue zosyn. At least 10-14 days with potential additional days for bone exposure in sacrum.    Discussed with nursing and with Dr. Gasca and spoke with Dr. Arenas who will see him at the [facility]    Medical Decision Making during this encounter was  [_] Low Complexity  [_] Moderate Complexity  [xx  ] High Complexity

## 2023-06-10 PROBLEM — Z51.89 WOUND CHECK, ABSCESS: Status: ACTIVE | Noted: 2023-06-10

## 2023-06-10 LAB
ALBUMIN SERPL BCP-MCNC: 2.2 G/DL (ref 3.5–5.2)
ALP SERPL-CCNC: 241 U/L (ref 55–135)
ALT SERPL W/O P-5'-P-CCNC: 37 U/L (ref 10–44)
ANION GAP SERPL CALC-SCNC: 9 MMOL/L (ref 8–16)
AST SERPL-CCNC: 48 U/L (ref 10–40)
BACTERIA BLD CULT: NORMAL
BASOPHILS # BLD AUTO: 0.07 K/UL (ref 0–0.2)
BASOPHILS NFR BLD: 0.8 % (ref 0–1.9)
BILIRUB SERPL-MCNC: 0.5 MG/DL (ref 0.1–1)
BUN SERPL-MCNC: 51 MG/DL (ref 8–23)
CALCIUM SERPL-MCNC: 9.1 MG/DL (ref 8.7–10.5)
CHLORIDE SERPL-SCNC: 98 MMOL/L (ref 95–110)
CO2 SERPL-SCNC: 28 MMOL/L (ref 23–29)
CREAT SERPL-MCNC: 1.5 MG/DL (ref 0.5–1.4)
DIFFERENTIAL METHOD: ABNORMAL
EOSINOPHIL # BLD AUTO: 0.2 K/UL (ref 0–0.5)
EOSINOPHIL NFR BLD: 2.9 % (ref 0–8)
ERYTHROCYTE [DISTWIDTH] IN BLOOD BY AUTOMATED COUNT: 16.4 % (ref 11.5–14.5)
EST. GFR  (NO RACE VARIABLE): 48.2 ML/MIN/1.73 M^2
GLUCOSE SERPL-MCNC: 104 MG/DL (ref 70–110)
GLUCOSE SERPL-MCNC: 169 MG/DL (ref 70–110)
GLUCOSE SERPL-MCNC: 86 MG/DL (ref 70–110)
GLUCOSE SERPL-MCNC: 93 MG/DL (ref 70–110)
GLUCOSE SERPL-MCNC: 97 MG/DL (ref 70–110)
HBA1C MFR BLD: 5 % (ref 4.8–5.6)
HCT VFR BLD AUTO: 24.4 % (ref 40–54)
HGB BLD-MCNC: 8.2 G/DL (ref 14–18)
IMM GRANULOCYTES # BLD AUTO: 0.07 K/UL (ref 0–0.04)
IMM GRANULOCYTES NFR BLD AUTO: 0.8 % (ref 0–0.5)
INR PPP: 1.6 (ref 0.8–1.2)
LYMPHOCYTES # BLD AUTO: 1.1 K/UL (ref 1–4.8)
LYMPHOCYTES NFR BLD: 13.6 % (ref 18–48)
MAGNESIUM SERPL-MCNC: 1.9 MG/DL (ref 1.6–2.6)
MCH RBC QN AUTO: 32 PG (ref 27–31)
MCHC RBC AUTO-ENTMCNC: 33.6 G/DL (ref 32–36)
MCV RBC AUTO: 95 FL (ref 82–98)
MONOCYTES # BLD AUTO: 0.7 K/UL (ref 0.3–1)
MONOCYTES NFR BLD: 8 % (ref 4–15)
NEUTROPHILS # BLD AUTO: 6.1 K/UL (ref 1.8–7.7)
NEUTROPHILS NFR BLD: 73.9 % (ref 38–73)
NRBC BLD-RTO: 0 /100 WBC
PLATELET # BLD AUTO: 423 K/UL (ref 150–450)
PMV BLD AUTO: 10 FL (ref 9.2–12.9)
POTASSIUM SERPL-SCNC: 4.1 MMOL/L (ref 3.5–5.1)
PROT SERPL-MCNC: 6.2 G/DL (ref 6–8.4)
PROTHROMBIN TIME: 17.1 SEC (ref 9–12.5)
RBC # BLD AUTO: 2.56 M/UL (ref 4.6–6.2)
SODIUM SERPL-SCNC: 135 MMOL/L (ref 136–145)
WBC # BLD AUTO: 8.29 K/UL (ref 3.9–12.7)

## 2023-06-10 PROCEDURE — 85610 PROTHROMBIN TIME: CPT | Performed by: FAMILY MEDICINE

## 2023-06-10 PROCEDURE — 99900035 HC TECH TIME PER 15 MIN (STAT)

## 2023-06-10 PROCEDURE — 63600175 PHARM REV CODE 636 W HCPCS: Performed by: INTERNAL MEDICINE

## 2023-06-10 PROCEDURE — 99233 PR SUBSEQUENT HOSPITAL CARE,LEVL III: ICD-10-PCS | Mod: ,,, | Performed by: INTERNAL MEDICINE

## 2023-06-10 PROCEDURE — 25000003 PHARM REV CODE 250: Performed by: INTERNAL MEDICINE

## 2023-06-10 PROCEDURE — 36415 COLL VENOUS BLD VENIPUNCTURE: CPT | Performed by: FAMILY MEDICINE

## 2023-06-10 PROCEDURE — 80053 COMPREHEN METABOLIC PANEL: CPT | Performed by: FAMILY MEDICINE

## 2023-06-10 PROCEDURE — 83735 ASSAY OF MAGNESIUM: CPT | Performed by: FAMILY MEDICINE

## 2023-06-10 PROCEDURE — 12000002 HC ACUTE/MED SURGE SEMI-PRIVATE ROOM

## 2023-06-10 PROCEDURE — 94799 UNLISTED PULMONARY SVC/PX: CPT

## 2023-06-10 PROCEDURE — 99233 SBSQ HOSP IP/OBS HIGH 50: CPT | Mod: ,,, | Performed by: INTERNAL MEDICINE

## 2023-06-10 PROCEDURE — 94761 N-INVAS EAR/PLS OXIMETRY MLT: CPT

## 2023-06-10 PROCEDURE — 82962 GLUCOSE BLOOD TEST: CPT

## 2023-06-10 PROCEDURE — 85025 COMPLETE CBC W/AUTO DIFF WBC: CPT | Performed by: FAMILY MEDICINE

## 2023-06-10 PROCEDURE — 99900031 HC PATIENT EDUCATION (STAT)

## 2023-06-10 PROCEDURE — 63600175 PHARM REV CODE 636 W HCPCS: Performed by: FAMILY MEDICINE

## 2023-06-10 PROCEDURE — 25000003 PHARM REV CODE 250: Performed by: FAMILY MEDICINE

## 2023-06-10 RX ADMIN — ATORVASTATIN CALCIUM 20 MG: 20 TABLET, FILM COATED ORAL at 08:06

## 2023-06-10 RX ADMIN — AMIODARONE HYDROCHLORIDE 200 MG: 200 TABLET ORAL at 08:06

## 2023-06-10 RX ADMIN — FUROSEMIDE 20 MG: 20 INJECTION, SOLUTION INTRAMUSCULAR; INTRAVENOUS at 06:06

## 2023-06-10 RX ADMIN — GUAIFENESIN 600 MG: 600 TABLET, EXTENDED RELEASE ORAL at 08:06

## 2023-06-10 RX ADMIN — PIPERACILLIN SODIUM AND TAZOBACTAM SODIUM 3.38 G: 3; .375 INJECTION, POWDER, LYOPHILIZED, FOR SOLUTION INTRAVENOUS at 06:06

## 2023-06-10 RX ADMIN — FUROSEMIDE 20 MG: 20 INJECTION, SOLUTION INTRAMUSCULAR; INTRAVENOUS at 05:06

## 2023-06-10 RX ADMIN — PIPERACILLIN SODIUM AND TAZOBACTAM SODIUM 3.38 G: 3; .375 INJECTION, POWDER, LYOPHILIZED, FOR SOLUTION INTRAVENOUS at 08:06

## 2023-06-10 RX ADMIN — MICAFUNGIN SODIUM 100 MG: 100 INJECTION, POWDER, LYOPHILIZED, FOR SOLUTION INTRAVENOUS at 08:06

## 2023-06-10 RX ADMIN — WARFARIN SODIUM 4 MG: 1 TABLET ORAL at 06:06

## 2023-06-10 RX ADMIN — INSULIN ASPART 1 UNITS: 100 INJECTION, SOLUTION INTRAVENOUS; SUBCUTANEOUS at 09:06

## 2023-06-10 RX ADMIN — ALLOPURINOL 100 MG: 100 TABLET ORAL at 08:06

## 2023-06-10 RX ADMIN — PIPERACILLIN SODIUM AND TAZOBACTAM SODIUM 3.38 G: 3; .375 INJECTION, POWDER, LYOPHILIZED, FOR SOLUTION INTRAVENOUS at 11:06

## 2023-06-10 RX ADMIN — WHITE PETROLATUM 41 % TOPICAL OINTMENT: at 08:06

## 2023-06-10 NOTE — NURSING
Patient refused to be turned.  Refused to allow me to assess his sacral wound.  Amira Trejo RN present during this conversation.  Attempt made by both myself and Amira to educated, encourage patient to allow us to turn him to assess his sacrum.  Explained to patient that is why his wound is not healing, he must make an effort and allow staff to turn him off of his bottom.  Continues to refuse.

## 2023-06-10 NOTE — PROGRESS NOTES
Consult Note  Infectious Disease    Reason for Consult:  ID judy    HPI: Richard Bustos is a 77 y.o. male known to our service for treatment of stage 4 sacral decubitus, surgically debrided 4/20 with diverting colostomy 4/25 then treated at [facility] through 5/31 then to TCU in same facility. He completed 4+ weeks of broad spectrum antibiotics, adjusted part way through for new tissue cultures with pseudomonas/enterobacter, with history of long term augmentin(6 weeks prior to that admission) for possible osteomyelitis of foot ulcer. He was sent to the ED for concern of Halle's gangrene as scrotal wounds were felt to be worse. He has been CT scanned, seen by wound care and general surgery. Numerous wound photos were reviewed and d/w wound care RN. Wound care MD intends to debride a bit tomorrow and wound vac will be replaced to sacrum.  He submitted superficial swab cultures of the scrotum and sacrum. Additionally he has had gross hematuria the entire time he as at LTAC and SNF, worse with elevated INR,  on coumadin for history of MTHFR mutation, history of prostate cancer, status unknown.     6/7: Interim reviewed.  Afebrile, urine culture negative, so far.  wound cultures have Gram-negative rods as expected.  Ruelas was irrigated and improved in quality.  Urology consult pending.  Hemoglobin is stable at 7.5.  Discussed with his family that he has been in remission from prostate cancer for some time.  They do not know the etiology of the hematuria but comment that he has renal cysts.  He tells me that the catheter was changed today but it actually was changed on the 5th.    6/8: interim reviewed. Blood cultures drawn on 6/5 have grown candida tropicalis and glabrata. Diflucan was begun last night. He has no fever. His picc line is over a month old. His urine culture is negative. He has no new complaints and is at his recent baseline. No change in vision. No peripheral signs of candidemia. Wound cultures notes, but  represent surface colonization.   6/9: Interim reviewed.  Blood cultures from yesterday are negative so far in the line tip is negative at less than 24 hours.  Pseudomonas was added to his blood cultures today.  The cultures taken from the surface of his sacrum and scrotum both of Pseudomonas.  His sacral bone was debrided a bit by Dr. Eddy today.  Zosyn was added.  Transthoracic echo was negative for vegetations    06/10/2023.  Afebrile.  Having lunch and having a good appetite..  The air mattress bothersome at times and nurses had to corrected a few times.  Creatinine 1.3--1.5--1.5.  WBC 8.    Antibiotics (From admission, onward)      Start     Stop Route Frequency Ordered    06/09/23 1400  piperacillin-tazobactam 3.375 g in dextrose 5 % 100 mL IVPB (ready to mix)         -- IV Every 8 hours (non-standard times) 06/09/23 1312           Antifungals (From admission, onward)      Start     Stop Route Frequency Ordered    06/08/23 1800  micafungin 100 mg in sodium chloride 0.9 % 100 mL IVPB (ready to mix)         -- IV Every 24 hours (non-standard times) 06/08/23 1653          Antivirals (From admission, onward)      None              .asses  Review of patient's allergies indicates:   Allergen Reactions    Donepezil Other (See Comments)     AMS    Bactroban [mupirocin calcium] Blisters     Causes Blisters    Shellfish containing products Other (See Comments)     Other reaction(s): Gout  OYSTERS     Past Medical History:   Diagnosis Date    Anemia     Anemia of other chronic disease 09/13/2017    Anemia, chronic renal failure 09/13/2017    Anemia, unspecified 09/13/2017    Anticoagulant long-term use     Aorta aneurysm     Arthritis     Atrial fibrillation     Bacteremia due to Streptococcus 01/08/2022    CAD (coronary artery disease)     CHF (congestive heart failure)     Chronic kidney disease     Clotting disorder 09/13/2017    Colon polyp     Dementia     Diabetes mellitus     not on meds    Diabetes mellitus  type II     Diverticulosis     Elevated PSA     Encounter for blood transfusion     Former smoker     General anesthetics causing adverse effect in therapeutic use     TROUBLE COMING OUT OF ANESTHESIA WITH GASTRIC BYPASS    GERD (gastroesophageal reflux disease)     Gout     Hx of colonic polyps     Hypercoagulable state     Hyperlipidemia     Hypertension     MI, old 2010    MTHFR mutation     Myocardial infarction     9/2010    Osteomyelitis of ankle or foot 03/09/2017    Prostate cancer 06/2016    Sleep apnea     Squamous cell carcinoma 01/23/2018    Left helix, imiquimod    Supratherapeutic INR 4/19/2023    Venous stasis     Chronic     Past Surgical History:   Procedure Laterality Date    ABDOMINAL SURGERY      CARDIAC SURGERY      3 STENTS     CAUDAL EPIDURAL STEROID INJECTION N/A 6/22/2020    Procedure: INJECTION, STEROID, SPINE, EPIDURAL, CAUDAL;  Surgeon: Evangelist Bose MD;  Location: [facility] OR;  Service: Pain Management;  Laterality: N/A;    COLONOSCOPY  02/2011    diverticulosis with diverticula with clot, no active bleeding    COLONOSCOPY N/A 8/24/2016    Procedure: COLONOSCOPY;  Surgeon: Saroj Borjas MD;  Location: [facility];  Service: Endoscopy;  Laterality: N/A;    COLONOSCOPY N/A 11/18/2020    Procedure: COLONOSCOPY;  Surgeon: Saroj Borjas MD;  Location: [facility];  Service: Endoscopy;  Laterality: N/A;    COLONOSCOPY N/A 10/27/2021    Procedure: COLONOSCOPY;  Surgeon: Saroj Borjas MD;  Location: [facility];  Service: Endoscopy;  Laterality: N/A;    CREATION, COLOSTOMY, LAPAROSCOPIC N/A 4/25/2023    Procedure: CREATION, COLOSTOMY, LAPAROSCOPIC;  Surgeon: Mark Martinez Jr., MD;  Location: [facility] OR;  Service: General;  Laterality: N/A;    DEBRIDEMENT OF SACRAL WOUND N/A 4/20/2023    Procedure: DEBRIDEMENT, WOUND, SACRUM;  Surgeon: Mark Martinez Jr., MD;  Location: [facility] OR;  Service: General;  Laterality: N/A;    EPIDURAL STEROID INJECTION INTO LUMBAR SPINE N/A 6/22/2020     Procedure: Injection-steroid-epidural-lumbar;  Surgeon: Evangelist Bose MD;  Location: [facility] OR;  Service: Pain Management;  Laterality: N/A;  L3 L4 L5 S1    EPIDURAL STEROID INJECTION INTO LUMBAR SPINE N/A 9/21/2020    Procedure: Injection-steroid-epidural-lumbar;  Surgeon: Evangelist Bose MD;  Location: [facility] OR;  Service: Pain Management;  Laterality: N/A;  L5-S1    FUSION, SPINE, CERVICAL N/A 3/24/2023    Procedure: FUSION, SPINE, CERVICAL;  Surgeon: Kike Kc DO;  Location: [facility] OR;  Service: Neurosurgery;  Laterality: N/A;  posterior cervical decompression/fusion C3-T1    GASTRIC BYPASS  2/5/2008    IVC FILTER RETRIEVAL      JOINT REPLACEMENT  1996 and 2001    bi-lat hip replacement/Rt Hip and Lt Hip    RADIOFREQUENCY ABLATION OF LUMBAR MEDIAL BRANCH NERVE AT SINGLE LEVEL Bilateral 1/10/2020    Procedure: Radiofrequency Ablation, Nerve, Spinal, Lumbar, Medial Branch, 1 Level;  Surgeon: Evangelist Bose MD;  Location: [facility] OR;  Service: Pain Management;  Laterality: Bilateral;  L3,L4,L5    RADIOFREQUENCY ABLATION OF LUMBAR MEDIAL BRANCH NERVE AT SINGLE LEVEL Bilateral 11/23/2021    Procedure: Radiofrequency Ablation, Nerve, Spinal, Lumbar, Medial Branch, Bilateral L 3,4,5;  Surgeon: Nile Causey MD;  Location: [facility] OR;  Service: Pain Management;  Laterality: Bilateral;    ROTATOR CUFF REPAIR      Rt shoulder    Stents  8/18/2010    x 3    UPPER GASTROINTESTINAL ENDOSCOPY  02/2011         EXAM & DIAGNOSTICS REVIEWED:   Vitals:     Temp:  [97.8 °F (36.6 °C)-99.2 °F (37.3 °C)]   Temp: 97.8 °F (36.6 °C) (06/10/23 0459)  Pulse: 75 (06/10/23 0459)  Resp: 20 (06/10/23 0459)  BP: 127/70 (06/10/23 0459)  SpO2: 98 % (06/10/23 0459)    Intake/Output Summary (Last 24 hours) at 6/10/2023 0708  Last data filed at 6/10/2023 0457  Gross per 24 hour   Intake 1600 ml   Output 3100 ml   Net -1500 ml      General:  In NAD. Alert and attentive, cooperative, comfortable and nontoxic  Eyes:  Anicteric,   EOMI , resolving facial  ecchymosis, no scleral or conjunctival petechia  ENT:  No ulcers, exudates, thrush, nares patent,  sagging of skin in the neck area  Neck:  supple,   Lungs: Breathing comfortably, clear lungs  Heart:  RRR,   no murmur  Abd:  Soft, obese, LLQ ostomy with yellow stool, NT, ND,    :   Ruelas, with pink urine, Ruelas changed 6/5  Musc:  Joints without effusion, swelling, erythema, synovitis, with generalized muscle wasting.   Skin:  No rashes.    Neuro:             Alert, attentive, speech fluent, face symmetric, moves all extremities, but not with strength  not Ambulatory and profoundly weak in general.  History is unreliable  Psych:  Calm, pleasant, cooperative  Lymphatic:      Extrem: Mild extremity edema, without cellulitis,      VAD:  Picc left arm 4/26, no external sign of infection, removed 6/8.  Right upper extremity midline in place    Isolation:  contact  Wound:   Erosion on dorsal penis    Base of scrotum    sacrum       Small amount of bone palpable per wound care nurse(this is not new)               General Labs reviewed:  Recent Labs   Lab 06/07/23  0440 06/08/23  0452 06/09/23  0201   WBC 9.97 9.01 9.05   HGB 7.5* 7.8* 7.7*   HCT 22.4* 23.0* 23.4*    363 376       Recent Labs   Lab 06/07/23  0440 06/08/23  0452 06/09/23  0201    141 133*   K 4.0 4.4 4.2    105 98   CO2 27 28 26   BUN 33* 35* 45*   CREATININE 1.2 1.3 1.5*   CALCIUM 8.7 9.0 8.5*   PROT 5.6* 5.6* 5.7*   BILITOT 0.7 0.7 0.5   ALKPHOS 204* 218* 260*   ALT 35 36 46*   AST 35 42* 70*     Recent Labs   Lab 06/05/23  1643   CRP 6.79*     Procal 0/73  Lactic normal      Micro:  Microbiology Results (last 7 days)       Procedure Component Value Units Date/Time    Blood culture [[id number]] Collected: 06/08/23 1931    Order Status: Completed Specimen: Blood from Line, PICC Left Basilic Updated: 06/09/23 2032     Blood Culture, Routine No Growth to date      No Growth to date    Narrative:      Draw from picc line    Blood culture x  two cultures. Draw prior to antibiotics [[id number]] Collected: 06/05/23 1643    Order Status: Completed Specimen: Blood from Line, PICC Left Basilic Updated: 06/09/23 1832     Blood Culture, Routine No Growth to date      No Growth to date      No Growth to date      No Growth to date      No Growth to date    Narrative:      Aerobic and anaerobic    IV catheter culture [[id number]] Collected: 06/09/23 0201    Order Status: Completed Specimen: Catheter Tip, PICC Updated: 06/09/23 1338     Aerobic Culture - Cath tip No growth    Aerobic culture [[id number]]  (Abnormal)  (Susceptibility) Collected: 06/06/23 1059    Order Status: Completed Specimen: Wound from Sacrum Updated: 06/09/23 1302     Aerobic Bacterial Culture PSEUDOMONAS AERUGINOSA   Results called to and read back by Nile Kong RN-[address] [place] [address];  06/09/2023    13:02 CJD      Narrative:      Sacrum    Blood culture x two cultures. Draw prior to antibiotics [[id number]]  (Abnormal) Collected: 06/05/23 1644    Order Status: Completed Specimen: Blood from Line, PICC Left Cephalic Updated: 06/09/23 0754     Blood Culture, Routine Gram stain miroslava bottle: yeast      Results called to and read back by: Melonie Mason RN on [address]      PSEUDOMONAS AERUGINOSA  susceptibility pending      Narrative:      Aerobic and anaerobic    Culture, Anaerobic [[id number]] Collected: 06/06/23 1059    Order Status: Completed Specimen: Wound from Sacrum Updated: 06/09/23 0742     Anaerobic Culture No anaerobes isolated    Narrative:      Sacrum    Culture, Anaerobic [[id number]] Collected: 06/06/23 1057    Order Status: Completed Specimen: Wound from Perineum Updated: 06/08/23 1455     Anaerobic Culture No anaerobes isolated    Narrative:      Wound of the perineum/scrotum    Aerobic culture [[id number]]  (Abnormal)  (Susceptibility) Collected: 06/06/23 1057    Order Status: Completed Specimen: Wound from Perineum Updated: 06/08/23 0937     Aerobic Bacterial Culture PSEUDOMONAS  AERUGINOSA  Few      Narrative:      Wound of the perineum/scrotum    Urine culture [[id number]] Collected: 06/05/23 1657    Order Status: Completed Specimen: Urine Updated: 06/08/23 0816     Urine Culture, Routine No growth    Narrative:      Specimen Source->Urine    Rapid Organism ID by PCR (from Blood culture) [[id number]]  (Abnormal) Collected: 06/05/23 1644    Order Status: Completed Updated: 06/07/23 2141     Enterococcus faecalis Not Detected     Enterococcus faecium Not Detected     Listeria monocytogenes Not Detected     Staphylococcus spp. Not Detected     Staphylococcus aureus Not Detected     Staphylococcus epidermidis Not Detected     Staphylococcus lugdunensis Not Detected     Streptococcus species Not Detected     Streptococcus agalactiae Not Detected     Streptococcus pneumoniae Not Detected     Streptococcus pyogenes Not Detected     Acinetobacter calcoaceticus/baumannii complex Not Detected     Bacteroides fragilis Not Detected     Enterobacterales Not Detected     Enterobacter cloacae complex Not Detected     Escherichia coli Not Detected     Klebsiella aerogenes Not Detected     Klebsiella oxytoca Not Detected     Klebsiella pneumoniae group Not Detected     Proteus Not Detected     Salmonella sp Not Detected     Serratia marcescens Not Detected     Haemophilus influenzae Not Detected     Neisseria meningtidis Not Detected     Pseudomonas aeruginosa Not Detected     Stenotrophomonas maltophilia Not Detected     Candida albicans Not Detected     Candida auris Not Detected     Mary glabrata Detected     Mary krusei Not Detected     Candida parapsilosis Not Detected     Candida tropicalis Detected     Cryptococcus neoformans/gattii Not Detected     CTX-M (ESBL ) Test not applicable     IMP (Carbapenem resistant) Test not applicable     KPC resistance gene (Carbapenem resistant) Test not applicable     mcr-1  Test not applicable     mec A/C  Test not applicable     mec A/C and MREJ  (MRSA) gene Test not applicable     NDM (Carbapenem resistant) Test not applicable     OXA-48-like (Carbapenem resistant) Test not applicable     van A/B (VRE gene) Test not applicable     VIM (Carbapenem resistant) Test not applicable    Narrative:      Aerobic and anaerobic    Aerobic culture [[id number]]     Order Status: Canceled Specimen: Wound from Sacrum           Imaging Reviewed:   CXR   CT abd/pelvis    Cardiology:  TTE  The left ventricle is normal in size with moderate concentric hypertrophy and low normal systolic function.  The estimated ejection fraction is 50%.  No clear evidence of endocarditis. If clinical suspicion persists, consider LATRICIA.    IMPRESSION & PLAN   1. S/p treatment for acute osteomyelitis of sacrum   Bone still exposed  2. Numerous other wounds, including erosions of base of scrotum with no clinical evidence of nikos's gangrene.  Superficial cultures of the wound are growing Pseudomonas and a positive culture from the surface would be expected    3. Persistent gross hematuria, for many weeks, on coumadin for MTHFR   INR down  4. Numerous other co-morbidities  5.  Candidemia, with Pseudomonas as well, blood cultures drawn 6/5, PICC line removed 6/8    Recommendations:  --Continue Mycamine, 14 days assuming negative repeat blood cultures  ---Continue zosyn. At least 10-14 days with potential additional days for bone exposure in sacrum.  --encourage eating, improve nutrition, albumin is low at 2.2.  I discussed with patient and wife.     Discussed with nursing and with Dr. Gasca and spoke with Dr. Arenas who will see him at the LTAC    Medical Decision Making during this encounter was  [_] Low Complexity  [_] Moderate Complexity  [xx  ] High Complexity

## 2023-06-10 NOTE — NURSING
Patient allowed me to turn and assess scrotal and sacral wound.  Dressing to sacral wound, was pulled back and noted SS drainage on the gauze.  Scrotal wound MARZENA, scan amount of blood.  Applied medi-honey.  Turned onto left side with pillow support.

## 2023-06-10 NOTE — PLAN OF CARE
Problem: Fatigue  Goal: Improved Activity Tolerance  Outcome: Ongoing, Progressing  Intervention: Promote Improved Energy  Flowsheets (Taken 6/10/2023 0118)  Sleep/Rest Enhancement:   awakenings minimized   consistent schedule promoted     Problem: Adult Inpatient Plan of Care  Goal: Plan of Care Review  Outcome: Ongoing, Progressing  Goal: Patient-Specific Goal (Individualized)  Outcome: Ongoing, Progressing  Goal: Readiness for Transition of Care  Outcome: Ongoing, Progressing     Problem: Skin Injury Risk Increased  Goal: Skin Health and Integrity  Outcome: Ongoing, Progressing  Intervention: Promote and Optimize Oral Intake  Flowsheets (Taken 6/10/2023 0118)  Oral Nutrition Promotion: physical activity promoted     Problem: Infection  Goal: Absence of Infection Signs and Symptoms  Outcome: Ongoing, Progressing  Intervention: Prevent or Manage Infection  Flowsheets (Taken 6/10/2023 0118)  Infection Management: aseptic technique maintained  Isolation Precautions:   precautions maintained   contact

## 2023-06-10 NOTE — SUBJECTIVE & OBJECTIVE
Interval History:   As per subjective     Review of Systems  Objective:     Vital Signs (Most Recent):  Temp: 98.1 °F (36.7 °C) (06/10/23 0733)  Pulse: 72 (06/10/23 0733)  Resp: 19 (06/10/23 0733)  BP: 115/67 (06/10/23 0733)  SpO2: (!) 94 % (06/10/23 0733) Vital Signs (24h Range):  Temp:  [97.8 °F (36.6 °C)-99.2 °F (37.3 °C)] 98.1 °F (36.7 °C)  Pulse:  [70-75] 72  Resp:  [16-20] 19  SpO2:  [94 %-98 %] 94 %  BP: (115-127)/(57-71) 115/67     Weight: 102.7 kg (226 lb 6.6 oz)  Body mass index is 34.43 kg/m².    Intake/Output Summary (Last 24 hours) at 6/10/2023 1117  Last data filed at 6/10/2023 0945  Gross per 24 hour   Intake 1900 ml   Output 3100 ml   Net -1200 ml         Physical Exam  Constitutional:       General: He is not in acute distress.     Appearance: He is well-developed.   HENT:      Head: Normocephalic.   Eyes:      Pupils: Pupils are equal, round, and reactive to light.   Cardiovascular:      Rate and Rhythm: Normal rate and regular rhythm.   Pulmonary:      Effort: Pulmonary effort is normal. No respiratory distress.   Abdominal:      General: Abdomen is flat. There is no distension.      Tenderness: There is no abdominal tenderness.   Musculoskeletal:      Cervical back: Neck supple.   Skin:     Findings: No rash.   Neurological:      Mental Status: He is alert and oriented to person, place, and time.   Psychiatric:         Mood and Affect: Mood normal.           Significant Labs: All pertinent labs within the past 24 hours have been reviewed.  CBC:   Recent Labs   Lab 06/09/23  0201 06/10/23  0631   WBC 9.05 8.29   HGB 7.7* 8.2*   HCT 23.4* 24.4*    423     CMP:   Recent Labs   Lab 06/09/23  0201 06/10/23  0631   * 135*   K 4.2 4.1   CL 98 98   CO2 26 28   GLU 89 86   BUN 45* 51*   CREATININE 1.5* 1.5*   CALCIUM 8.5* 9.1   PROT 5.7* 6.2   ALBUMIN 1.9* 2.2*   BILITOT 0.5 0.5   ALKPHOS 260* 241*   AST 70* 48*   ALT 46* 37   ANIONGAP 9 9     Cardiac Markers: No results for input(s): CKMB,  MYOGLOBIN, BNP, TROPISTAT in the last 48 hours.    Significant Imaging: I have reviewed all pertinent imaging results/findings within the past 24 hours.

## 2023-06-10 NOTE — NURSING
Patient refused to turn to side for me to assess his sacral wound.  Educated patient on the need to stay off of wound but continued to refuse.

## 2023-06-10 NOTE — PROGRESS NOTES
"[facility] Medicine  Progress Note    Patient Name: Richard Bustos  MRN: [id number]  Patient Class: IP- Inpatient   Admission Date: 6/5/2023  Length of Stay: 5 days  Attending Physician: Tolu Gasca MD  Primary Care Provider: Familia Ramirez Jr, MD        Subjective:     Principal Problem:Hematuria        HPI:  Richard Bustos is a 77 y.o. White male   With PMH of as noted below,  Recently treated for sacral osteomyelitis,   S/p diverting colostomy,  who presents with worsening sacral wound.     Pt reports he feels "fine"  He denies pain at this time  Pt's wound recently grew morganella, proteus,  Then later pseudomonas (only intermediate sensitivity to cefepime), and enterobacter cloacae (intermediate sensitivity to zosyn)      Overview/Hospital Course:  Seen and examined   Afebrile . Urine clearing in the tube .  INR better  D/w family long time . D/w case Mx . 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 as scrotal wounds were felt to be worse  Appear superficial wound cultures and currently not treating that  But later patient blood culture came back with yeast Pseudomonas and discharged with Zosyn and micafungin  On examination patient is awake alert oriented and no fever, abdominal has no pain  Almost discharged yesterday but there is new rule to be on tele monitor 2 days before able to acceptable LTAC and discharge cancelled         Interval History:   As per subjective     Review of Systems  Objective:     Vital Signs (Most Recent):  Temp: 98.1 °F (36.7 °C) (06/10/23 0733)  Pulse: 72 (06/10/23 0733)  Resp: 19 (06/10/23 0733)  BP: 115/67 (06/10/23 0733)  SpO2: (!) 94 % (06/10/23 0733) Vital Signs (24h Range):  Temp:  [97.8 °F (36.6 °C)-99.2 °F (37.3 °C)] 98.1 °F " (36.7 °C)  Pulse:  [70-75] 72  Resp:  [16-20] 19  SpO2:  [94 %-98 %] 94 %  BP: (115-127)/(57-71) 115/67     Weight: 102.7 kg (226 lb 6.6 oz)  Body mass index is 34.43 kg/m².    Intake/Output Summary (Last 24 hours) at 6/10/2023 1117  Last data filed at 6/10/2023 0945  Gross per 24 hour   Intake 1900 ml   Output 3100 ml   Net -1200 ml         Physical Exam  Constitutional:       General: He is not in acute distress.     Appearance: He is well-developed.   HENT:      Head: Normocephalic.   Eyes:      Pupils: Pupils are equal, round, and reactive to light.   Cardiovascular:      Rate and Rhythm: Normal rate and regular rhythm.   Pulmonary:      Effort: Pulmonary effort is normal. No respiratory distress.   Abdominal:      General: Abdomen is flat. There is no distension.      Tenderness: There is no abdominal tenderness.   Musculoskeletal:      Cervical back: Neck supple.   Skin:     Findings: No rash.   Neurological:      Mental Status: He is alert and oriented to person, place, and time.   Psychiatric:         Mood and Affect: Mood normal.           Significant Labs: All pertinent labs within the past 24 hours have been reviewed.  CBC:   Recent Labs   Lab 06/09/23  0201 06/10/23  0631   WBC 9.05 8.29   HGB 7.7* 8.2*   HCT 23.4* 24.4*    423     CMP:   Recent Labs   Lab 06/09/23  0201 06/10/23  0631   * 135*   K 4.2 4.1   CL 98 98   CO2 26 28   GLU 89 86   BUN 45* 51*   CREATININE 1.5* 1.5*   CALCIUM 8.5* 9.1   PROT 5.7* 6.2   ALBUMIN 1.9* 2.2*   BILITOT 0.5 0.5   ALKPHOS 260* 241*   AST 70* 48*   ALT 46* 37   ANIONGAP 9 9     Cardiac Markers: No results for input(s): CKMB, MYOGLOBIN, BNP, TROPISTAT in the last 48 hours.    Significant Imaging: I have reviewed all pertinent imaging results/findings within the past 24 hours.      Assessment/Plan:      Active Hospital Problems    Diagnosis    *Hematuria    Candidemia    CLAU (iron deficiency anemia)    Pneumoperitoneum of unknown etiology    Closed  nondisplaced fracture of lateral malleolus of fibula    Bedbound    Decubitus ulcer, multiple chronic    Anticoagulated on Coumadin    Anemia    HFrEF (heart failure with reduced ejection fraction)    Scrotal injury    Severe protein-calorie malnutrition    Pressure injury of sacral region, stage 4    Bacteremia    Complex renal cyst    MCI (mild cognitive impairment)    Paroxysmal atrial fibrillation     EKG stable  Stable on Coreg, no missed doses of anticoagulation. Continue anticoagulation as described below      LV dysfunction, EF 40%     Last Echo 5/2020  Repeat echo stable  Continue lisinopril and Coreg as now  Monitor      CKD (chronic kidney disease) stage 3, GFR 30-59 ml/min, 41    Aortic aneurysm, thoracic, 4.3 cm, 8/2010     Measurement of aortic root appears stable- has not had CTA chest to monitor.  Will discuss with Dr. Soto and will recommend CT chest to follow      Diabetes mellitus with chronic kidney disease, stage III    MTHFR mutation (methylenetetrahydrofolate reductase)    Sleep apnea, using BiPAP nightly, 1999, last sleep test in 2013    Hypertension associated with diabetes    CAD (coronary artery disease)    GERD (gastroesophageal reflux disease)       Plan:   zosyn for pseudomonas in blood culture  and total 14 days  Micagungin for yeast in blood culture and total 14 days     follow final yeast identification   Contact isolation precautions  Wound care for sacram  Wound. No treating for that   Hematuria resolved and resumed Coumadin 4 mg hs and may need to increased back gradually . DC with arslan and uro wants to keep atleast 1 week . Last hematuria 2 days ago  PO Lasix 20 mg b.i.d. and following volume status  S/p IV iron  Appear he is on triple therapy at home . Had cardiac stent but not recently and hold aspirin  ECHO negative for infection signs. D/w cardiology   LTAC approval awaited and   Possible ready on Monday   Medically cleared for transfer to  LTAC  Patient need to follow up with ID dr Arenas in LTAC . Dr Long communicated   Old PICC removed and got new midline yesterday     VTE Risk Mitigation (From admission, onward)         Ordered     warfarin (COUMADIN) tablet 4 mg  Daily         06/08/23 1402     IP VTE HIGH RISK PATIENT  Once         06/05/23 2315     Place sequential compression device  Until discontinued         06/05/23 2315     Reason for No Pharmacological VTE Prophylaxis  Once        Question:  Reasons:  Answer:  Already adequately anticoagulated on oral Anticoagulants    06/05/23 2315                Discharge Planning   ORACIO: 6/9/2023     Code Status: Full Code   Is the patient medically ready for discharge?:     Reason for patient still in hospital (select all that apply): Treatment  Discharge Plan A: Long-term acute care facility (LTAC)   Discharge Delays: (!) Ambulance Transport/Facility Transport              Tolu Gasca MD  Department of Hospital Medicine   [facility]

## 2023-06-10 NOTE — NURSING
Refused to be turned off of sacral wound.  Unable to assess wound.   Explained to patient that is why his wound is not healing, he must make an effort and allow staff to turn him off of his bottom.  Continues to refuse

## 2023-06-11 LAB
ALBUMIN SERPL BCP-MCNC: 2.1 G/DL (ref 3.5–5.2)
ALP SERPL-CCNC: 213 U/L (ref 55–135)
ALT SERPL W/O P-5'-P-CCNC: 30 U/L (ref 10–44)
ANION GAP SERPL CALC-SCNC: 10 MMOL/L (ref 8–16)
AST SERPL-CCNC: 33 U/L (ref 10–40)
BASOPHILS # BLD AUTO: 0.06 K/UL (ref 0–0.2)
BASOPHILS NFR BLD: 0.7 % (ref 0–1.9)
BILIRUB SERPL-MCNC: 0.9 MG/DL (ref 0.1–1)
BUN SERPL-MCNC: 47 MG/DL (ref 8–23)
CALCIUM SERPL-MCNC: 9 MG/DL (ref 8.7–10.5)
CHLORIDE SERPL-SCNC: 100 MMOL/L (ref 95–110)
CO2 SERPL-SCNC: 26 MMOL/L (ref 23–29)
CREAT SERPL-MCNC: 1.5 MG/DL (ref 0.5–1.4)
DIFFERENTIAL METHOD: ABNORMAL
EOSINOPHIL # BLD AUTO: 0.2 K/UL (ref 0–0.5)
EOSINOPHIL NFR BLD: 2.7 % (ref 0–8)
ERYTHROCYTE [DISTWIDTH] IN BLOOD BY AUTOMATED COUNT: 16.3 % (ref 11.5–14.5)
EST. GFR  (NO RACE VARIABLE): 48.2 ML/MIN/1.73 M^2
GLUCOSE SERPL-MCNC: 100 MG/DL (ref 70–110)
GLUCOSE SERPL-MCNC: 103 MG/DL (ref 70–110)
GLUCOSE SERPL-MCNC: 146 MG/DL (ref 70–110)
GLUCOSE SERPL-MCNC: 188 MG/DL (ref 70–110)
GLUCOSE SERPL-MCNC: 98 MG/DL (ref 70–110)
HCT VFR BLD AUTO: 23.7 % (ref 40–54)
HGB BLD-MCNC: 8 G/DL (ref 14–18)
IMM GRANULOCYTES # BLD AUTO: 0.06 K/UL (ref 0–0.04)
IMM GRANULOCYTES NFR BLD AUTO: 0.7 % (ref 0–0.5)
INR PPP: 2 (ref 0.8–1.2)
LYMPHOCYTES # BLD AUTO: 1 K/UL (ref 1–4.8)
LYMPHOCYTES NFR BLD: 12 % (ref 18–48)
MAGNESIUM SERPL-MCNC: 1.9 MG/DL (ref 1.6–2.6)
MCH RBC QN AUTO: 31.9 PG (ref 27–31)
MCHC RBC AUTO-ENTMCNC: 33.8 G/DL (ref 32–36)
MCV RBC AUTO: 94 FL (ref 82–98)
MONOCYTES # BLD AUTO: 0.7 K/UL (ref 0.3–1)
MONOCYTES NFR BLD: 7.8 % (ref 4–15)
NEUTROPHILS # BLD AUTO: 6.5 K/UL (ref 1.8–7.7)
NEUTROPHILS NFR BLD: 76.1 % (ref 38–73)
NRBC BLD-RTO: 0 /100 WBC
PLATELET # BLD AUTO: 403 K/UL (ref 150–450)
PMV BLD AUTO: 9.6 FL (ref 9.2–12.9)
POTASSIUM SERPL-SCNC: 3.8 MMOL/L (ref 3.5–5.1)
PROT SERPL-MCNC: 6 G/DL (ref 6–8.4)
PROTHROMBIN TIME: 21.5 SEC (ref 9–12.5)
RBC # BLD AUTO: 2.51 M/UL (ref 4.6–6.2)
SODIUM SERPL-SCNC: 136 MMOL/L (ref 136–145)
WBC # BLD AUTO: 8.59 K/UL (ref 3.9–12.7)

## 2023-06-11 PROCEDURE — 80053 COMPREHEN METABOLIC PANEL: CPT | Performed by: FAMILY MEDICINE

## 2023-06-11 PROCEDURE — 94799 UNLISTED PULMONARY SVC/PX: CPT

## 2023-06-11 PROCEDURE — 99900031 HC PATIENT EDUCATION (STAT)

## 2023-06-11 PROCEDURE — 99233 SBSQ HOSP IP/OBS HIGH 50: CPT | Mod: ,,, | Performed by: INTERNAL MEDICINE

## 2023-06-11 PROCEDURE — 12000002 HC ACUTE/MED SURGE SEMI-PRIVATE ROOM

## 2023-06-11 PROCEDURE — 85610 PROTHROMBIN TIME: CPT | Performed by: FAMILY MEDICINE

## 2023-06-11 PROCEDURE — 25000003 PHARM REV CODE 250: Performed by: FAMILY MEDICINE

## 2023-06-11 PROCEDURE — 82962 GLUCOSE BLOOD TEST: CPT

## 2023-06-11 PROCEDURE — 36415 COLL VENOUS BLD VENIPUNCTURE: CPT | Performed by: FAMILY MEDICINE

## 2023-06-11 PROCEDURE — 25000003 PHARM REV CODE 250: Performed by: INTERNAL MEDICINE

## 2023-06-11 PROCEDURE — 94660 CPAP INITIATION&MGMT: CPT

## 2023-06-11 PROCEDURE — 63600175 PHARM REV CODE 636 W HCPCS: Performed by: INTERNAL MEDICINE

## 2023-06-11 PROCEDURE — 99900035 HC TECH TIME PER 15 MIN (STAT)

## 2023-06-11 PROCEDURE — 99233 PR SUBSEQUENT HOSPITAL CARE,LEVL III: ICD-10-PCS | Mod: ,,, | Performed by: INTERNAL MEDICINE

## 2023-06-11 PROCEDURE — 94761 N-INVAS EAR/PLS OXIMETRY MLT: CPT

## 2023-06-11 PROCEDURE — 85025 COMPLETE CBC W/AUTO DIFF WBC: CPT | Performed by: FAMILY MEDICINE

## 2023-06-11 PROCEDURE — 83735 ASSAY OF MAGNESIUM: CPT | Performed by: FAMILY MEDICINE

## 2023-06-11 RX ADMIN — FUROSEMIDE 20 MG: 20 INJECTION, SOLUTION INTRAMUSCULAR; INTRAVENOUS at 04:06

## 2023-06-11 RX ADMIN — HYDROCODONE BITARTRATE AND ACETAMINOPHEN 1 TABLET: 5; 325 TABLET ORAL at 12:06

## 2023-06-11 RX ADMIN — FUROSEMIDE 20 MG: 20 INJECTION, SOLUTION INTRAMUSCULAR; INTRAVENOUS at 06:06

## 2023-06-11 RX ADMIN — PIPERACILLIN SODIUM AND TAZOBACTAM SODIUM 3.38 G: 3; .375 INJECTION, POWDER, LYOPHILIZED, FOR SOLUTION INTRAVENOUS at 06:06

## 2023-06-11 RX ADMIN — MICAFUNGIN SODIUM 100 MG: 100 INJECTION, POWDER, LYOPHILIZED, FOR SOLUTION INTRAVENOUS at 10:06

## 2023-06-11 RX ADMIN — WARFARIN SODIUM 4 MG: 1 TABLET ORAL at 06:06

## 2023-06-11 RX ADMIN — PIPERACILLIN SODIUM AND TAZOBACTAM SODIUM 3.38 G: 3; .375 INJECTION, POWDER, LYOPHILIZED, FOR SOLUTION INTRAVENOUS at 08:06

## 2023-06-11 RX ADMIN — GUAIFENESIN 600 MG: 600 TABLET, EXTENDED RELEASE ORAL at 08:06

## 2023-06-11 RX ADMIN — INSULIN ASPART 1 UNITS: 100 INJECTION, SOLUTION INTRAVENOUS; SUBCUTANEOUS at 10:06

## 2023-06-11 RX ADMIN — ALLOPURINOL 100 MG: 100 TABLET ORAL at 08:06

## 2023-06-11 RX ADMIN — AMIODARONE HYDROCHLORIDE 200 MG: 200 TABLET ORAL at 08:06

## 2023-06-11 RX ADMIN — WHITE PETROLATUM 41 % TOPICAL OINTMENT: at 08:06

## 2023-06-11 RX ADMIN — ATORVASTATIN CALCIUM 20 MG: 20 TABLET, FILM COATED ORAL at 08:06

## 2023-06-11 NOTE — PROGRESS NOTES
Consult Note  Infectious Disease    Reason for Consult:  ID judy    HPI: Richard Bustos is a 77 y.o. male known to our service for treatment of stage 4 sacral decubitus, surgically debrided 4/20 with diverting colostomy 4/25 then treated at [facility] through 5/31 then to TCU in same facility. He completed 4+ weeks of broad spectrum antibiotics, adjusted part way through for new tissue cultures with pseudomonas/enterobacter, with history of long term augmentin(6 weeks prior to that admission) for possible osteomyelitis of foot ulcer. He was sent to the ED for concern of Halle's gangrene as scrotal wounds were felt to be worse. He has been CT scanned, seen by wound care and general surgery. Numerous wound photos were reviewed and d/w wound care RN. Wound care MD intends to debride a bit tomorrow and wound vac will be replaced to sacrum.  He submitted superficial swab cultures of the scrotum and sacrum. Additionally he has had gross hematuria the entire time he as at LTAC and SNF, worse with elevated INR,  on coumadin for history of MTHFR mutation, history of prostate cancer, status unknown.     6/7: Interim reviewed.  Afebrile, urine culture negative, so far.  wound cultures have Gram-negative rods as expected.  Ruelas was irrigated and improved in quality.  Urology consult pending.  Hemoglobin is stable at 7.5.  Discussed with his family that he has been in remission from prostate cancer for some time.  They do not know the etiology of the hematuria but comment that he has renal cysts.  He tells me that the catheter was changed today but it actually was changed on the 5th.    6/8: interim reviewed. Blood cultures drawn on 6/5 have grown candida tropicalis and glabrata. Diflucan was begun last night. He has no fever. His picc line is over a month old. His urine culture is negative. He has no new complaints and is at his recent baseline. No change in vision. No peripheral signs of candidemia. Wound cultures notes, but  represent surface colonization.   6/9: Interim reviewed.  Blood cultures from yesterday are negative so far in the line tip is negative at less than 24 hours.  Pseudomonas was added to his blood cultures today.  The cultures taken from the surface of his sacrum and scrotum both of Pseudomonas.  His sacral bone was debrided a bit by Dr. Eddy today.  Zosyn was added.  Transthoracic echo was negative for vegetations    06/10/2023.  Afebrile.  Having lunch and having a good appetite..  The air mattress bothersome at times and nurses had to corrected a few times.  Creatinine 1.3--1.5--1.5.  WBC 8.  06/11/2023.  Dr. Hu.  No new complaints.  No fever no chills.  He has good appetite.  We discussed proper healing, protein intake, motivation activity etc..    Antibiotics (From admission, onward)      Start     Stop Route Frequency Ordered    06/09/23 1400  piperacillin-tazobactam 3.375 g in dextrose 5 % 100 mL IVPB (ready to mix)         -- IV Every 8 hours (non-standard times) 06/09/23 1312           Antifungals (From admission, onward)      Start     Stop Route Frequency Ordered    06/08/23 1800  micafungin 100 mg in sodium chloride 0.9 % 100 mL IVPB (ready to mix)         -- IV Every 24 hours (non-standard times) 06/08/23 1653          Antivirals (From admission, onward)      None              .asses  Review of patient's allergies indicates:   Allergen Reactions    Donepezil Other (See Comments)     AMS    Bactroban [mupirocin calcium] Blisters     Causes Blisters    Shellfish containing products Other (See Comments)     Other reaction(s): Gout  OYSTERS     Past Medical History:   Diagnosis Date    Anemia     Anemia of other chronic disease 09/13/2017    Anemia, chronic renal failure 09/13/2017    Anemia, unspecified 09/13/2017    Anticoagulant long-term use     Aorta aneurysm     Arthritis     Atrial fibrillation     Bacteremia due to Streptococcus 01/08/2022    CAD (coronary artery disease)     CHF (congestive  heart failure)     Chronic kidney disease     Clotting disorder 09/13/2017    Colon polyp     Dementia     Diabetes mellitus     not on meds    Diabetes mellitus type II     Diverticulosis     Elevated PSA     Encounter for blood transfusion     Former smoker     General anesthetics causing adverse effect in therapeutic use     TROUBLE COMING OUT OF ANESTHESIA WITH GASTRIC BYPASS    GERD (gastroesophageal reflux disease)     Gout     Hx of colonic polyps     Hypercoagulable state     Hyperlipidemia     Hypertension     MI, old 2010    MTHFR mutation     Myocardial infarction     9/2010    Osteomyelitis of ankle or foot 03/09/2017    Prostate cancer 06/2016    Sleep apnea     Squamous cell carcinoma 01/23/2018    Left helix, imiquimod    Supratherapeutic INR 4/19/2023    Venous stasis     Chronic     Past Surgical History:   Procedure Laterality Date    ABDOMINAL SURGERY      CARDIAC SURGERY      3 STENTS     CAUDAL EPIDURAL STEROID INJECTION N/A 6/22/2020    Procedure: INJECTION, STEROID, SPINE, EPIDURAL, CAUDAL;  Surgeon: Evangelist Bose MD;  Location: [facility] OR;  Service: Pain Management;  Laterality: N/A;    COLONOSCOPY  02/2011    diverticulosis with diverticula with clot, no active bleeding    COLONOSCOPY N/A 8/24/2016    Procedure: COLONOSCOPY;  Surgeon: Saroj Borjas MD;  Location: [facility];  Service: Endoscopy;  Laterality: N/A;    COLONOSCOPY N/A 11/18/2020    Procedure: COLONOSCOPY;  Surgeon: Saroj Borjas MD;  Location: [facility];  Service: Endoscopy;  Laterality: N/A;    COLONOSCOPY N/A 10/27/2021    Procedure: COLONOSCOPY;  Surgeon: Saroj Borjas MD;  Location: [facility];  Service: Endoscopy;  Laterality: N/A;    CREATION, COLOSTOMY, LAPAROSCOPIC N/A 4/25/2023    Procedure: CREATION, COLOSTOMY, LAPAROSCOPIC;  Surgeon: Mark Martinez Jr., MD;  Location: [facility] OR;  Service: General;  Laterality: N/A;    DEBRIDEMENT OF SACRAL WOUND N/A 4/20/2023    Procedure: DEBRIDEMENT, WOUND, SACRUM;   Surgeon: Mark Martinez Jr., MD;  Location: [facility] OR;  Service: General;  Laterality: N/A;    EPIDURAL STEROID INJECTION INTO LUMBAR SPINE N/A 6/22/2020    Procedure: Injection-steroid-epidural-lumbar;  Surgeon: Evangelist Bose MD;  Location: [facility] OR;  Service: Pain Management;  Laterality: N/A;  L3 L4 L5 S1    EPIDURAL STEROID INJECTION INTO LUMBAR SPINE N/A 9/21/2020    Procedure: Injection-steroid-epidural-lumbar;  Surgeon: Evangelist Bose MD;  Location: [facility] OR;  Service: Pain Management;  Laterality: N/A;  L5-S1    FUSION, SPINE, CERVICAL N/A 3/24/2023    Procedure: FUSION, SPINE, CERVICAL;  Surgeon: Kike Kc DO;  Location: [facility] OR;  Service: Neurosurgery;  Laterality: N/A;  posterior cervical decompression/fusion C3-T1    GASTRIC BYPASS  2/5/2008    IVC FILTER RETRIEVAL      JOINT REPLACEMENT  1996 and 2001    bi-lat hip replacement/Rt Hip and Lt Hip    RADIOFREQUENCY ABLATION OF LUMBAR MEDIAL BRANCH NERVE AT SINGLE LEVEL Bilateral 1/10/2020    Procedure: Radiofrequency Ablation, Nerve, Spinal, Lumbar, Medial Branch, 1 Level;  Surgeon: Evangelist Bose MD;  Location: [facility] OR;  Service: Pain Management;  Laterality: Bilateral;  L3,L4,L5    RADIOFREQUENCY ABLATION OF LUMBAR MEDIAL BRANCH NERVE AT SINGLE LEVEL Bilateral 11/23/2021    Procedure: Radiofrequency Ablation, Nerve, Spinal, Lumbar, Medial Branch, Bilateral L 3,4,5;  Surgeon: Nile Causey MD;  Location: [facility] OR;  Service: Pain Management;  Laterality: Bilateral;    ROTATOR CUFF REPAIR      Rt shoulder    Stents  8/18/2010    x 3    UPPER GASTROINTESTINAL ENDOSCOPY  02/2011         EXAM & DIAGNOSTICS REVIEWED:   Vitals:     Temp:  [97.4 °F (36.3 °C)-98.8 °F (37.1 °C)]   Temp: 98.3 °F (36.8 °C) (06/11/23 1109)  Pulse: 68 (06/11/23 1113)  Resp: 18 (06/11/23 1113)  BP: 134/64 (06/11/23 1109)  SpO2: 96 % (06/11/23 1113)    Intake/Output Summary (Last 24 hours) at 6/11/2023 1414  Last data filed at 6/11/2023 0920  Gross per 24 hour   Intake 5180  ml   Output 3300 ml   Net -1840 ml      General:  In NAD. Alert and attentive, cooperative, comfortable and nontoxic  Eyes:  Anicteric,   EOMI , resolving facial ecchymosis, no scleral or conjunctival petechia  ENT:  No ulcers, exudates, thrush, nares patent,  sagging of skin in the neck area  Neck:  supple,   Lungs: Breathing comfortably, clear lungs  Heart:  RRR,   no murmur  Abd:  Soft, obese, LLQ ostomy with yellow stool, NT, ND,    :   Ruelas changed 6/5  Musc:  Joints without effusion, swelling, erythema, synovitis, with generalized muscle wasting.   Skin:  Other than below No rashes.    Neuro:             Alert, attentive, speech fluent, face symmetric, moves all extremities, but not with strength  not Ambulatory and profoundly weak in general.  History is unreliable  Psych:  Calm, pleasant, cooperative  Lymphatic:      Extrem: Mild extremity edema, without cellulitis,      VAD:  Picc left arm 4/26, no external sign of infection, removed 6/8.  Right upper extremity midline in place    Isolation:  contact  Wound:  0611 2023. Dressing not disturbed today.  Patient is on an air mattress, difficult to turn.  Will follow pictures      Erosion on dorsal penis    Base of scrotum    sacrum       Small amount of bone palpable per wound care nurse(this is not new)            General Labs reviewed:  Recent Labs   Lab 06/09/23  0201 06/10/23  0631 06/11/23  0554   WBC 9.05 8.29 8.59   HGB 7.7* 8.2* 8.0*   HCT 23.4* 24.4* 23.7*    423 403       Recent Labs   Lab 06/09/23  0201 06/10/23  0631 06/11/23  0554   * 135* 136   K 4.2 4.1 3.8   CL 98 98 100   CO2 26 28 26   BUN 45* 51* 47*   CREATININE 1.5* 1.5* 1.5*   CALCIUM 8.5* 9.1 9.0   PROT 5.7* 6.2 6.0   BILITOT 0.5 0.5 0.9   ALKPHOS 260* 241* 213*   ALT 46* 37 30   AST 70* 48* 33     Recent Labs   Lab 06/05/23  1643   CRP 6.79*     Micro:  Microbiology Results (last 7 days)       Procedure Component Value Units Date/Time    Blood culture x two cultures. Draw  prior to antibiotics [[id number]]  (Abnormal)  (Susceptibility) Collected: 06/05/23 1644    Order Status: Completed Specimen: Blood from Line, PICC Left Cephalic Updated: 06/11/23 1220     Blood Culture, Routine Gram stain miroslava bottle: yeast      Results called to and read back by: Melonie Mason RN on 1100-wing      PSEUDOMONAS AERUGINOSA    Narrative:      Aerobic and anaerobic  Previous comment was modified by CHERYL at  12:20  on  06/11/2023   susceptibility pending  susceptibility pending    IV catheter culture [[id number]] Collected: 06/09/23 0201    Order Status: Completed Specimen: Catheter Tip, PICC Updated: 06/11/23 0851     Aerobic Culture - Cath tip No growth    Blood culture [[id number]] Collected: 06/08/23 1931    Order Status: Completed Specimen: Blood from Line, PICC Left Basilic Updated: 06/10/23 2032     Blood Culture, Routine No Growth to date      No Growth to date      No Growth to date    Narrative:      Draw from picc line    Blood culture x two cultures. Draw prior to antibiotics [[id number]] Collected: 06/05/23 1643    Order Status: Completed Specimen: Blood from Line, PICC Left Basilic Updated: 06/10/23 1832     Blood Culture, Routine No growth after 5 days.    Narrative:      Aerobic and anaerobic    Aerobic culture [[id number]]  (Abnormal)  (Susceptibility) Collected: 06/06/23 1059    Order Status: Completed Specimen: Wound from Sacrum Updated: 06/09/23 1302     Aerobic Bacterial Culture PSEUDOMONAS AERUGINOSA   Results called to and read back by Nile Kong RN-[address] [place] [address];  06/09/2023    13:02 CJD      Narrative:      Sacrum    Culture, Anaerobic [[id number]] Collected: 06/06/23 1059    Order Status: Completed Specimen: Wound from Sacrum Updated: 06/09/23 0742     Anaerobic Culture No anaerobes isolated    Narrative:      Sacrum    Culture, Anaerobic [[id number]] Collected: 06/06/23 1057    Order Status: Completed Specimen: Wound from Perineum Updated: 06/08/23 1455     Anaerobic Culture No anaerobes  isolated    Narrative:      Wound of the perineum/scrotum    Aerobic culture [[id number]]  (Abnormal)  (Susceptibility) Collected: 06/06/23 1057    Order Status: Completed Specimen: Wound from Perineum Updated: 06/08/23 0937     Aerobic Bacterial Culture PSEUDOMONAS AERUGINOSA  Few      Narrative:      Wound of the perineum/scrotum    Urine culture [[id number]] Collected: 06/05/23 1657    Order Status: Completed Specimen: Urine Updated: 06/08/23 0816     Urine Culture, Routine No growth    Narrative:      Specimen Source->Urine    Rapid Organism ID by PCR (from Blood culture) [[id number]]  (Abnormal) Collected: 06/05/23 1644    Order Status: Completed Updated: 06/07/23 2141     Enterococcus faecalis Not Detected     Enterococcus faecium Not Detected     Listeria monocytogenes Not Detected     Staphylococcus spp. Not Detected     Staphylococcus aureus Not Detected     Staphylococcus epidermidis Not Detected     Staphylococcus lugdunensis Not Detected     Streptococcus species Not Detected     Streptococcus agalactiae Not Detected     Streptococcus pneumoniae Not Detected     Streptococcus pyogenes Not Detected     Acinetobacter calcoaceticus/baumannii complex Not Detected     Bacteroides fragilis Not Detected     Enterobacterales Not Detected     Enterobacter cloacae complex Not Detected     Escherichia coli Not Detected     Klebsiella aerogenes Not Detected     Klebsiella oxytoca Not Detected     Klebsiella pneumoniae group Not Detected     Proteus Not Detected     Salmonella sp Not Detected     Serratia marcescens Not Detected     Haemophilus influenzae Not Detected     Neisseria meningtidis Not Detected     Pseudomonas aeruginosa Not Detected     Stenotrophomonas maltophilia Not Detected     Candida albicans Not Detected     Candida auris Not Detected     Mary glabrata Detected     Mary krusei Not Detected     Candida parapsilosis Not Detected     Candida tropicalis Detected     Cryptococcus neoformans/gattii  Not Detected     CTX-M (ESBL ) Test not applicable     IMP (Carbapenem resistant) Test not applicable     KPC resistance gene (Carbapenem resistant) Test not applicable     mcr-1  Test not applicable     mec A/C  Test not applicable     mec A/C and MREJ (MRSA) gene Test not applicable     NDM (Carbapenem resistant) Test not applicable     OXA-48-like (Carbapenem resistant) Test not applicable     van A/B (VRE gene) Test not applicable     VIM (Carbapenem resistant) Test not applicable    Narrative:      Aerobic and anaerobic    Aerobic culture [[id number]]     Order Status: Canceled Specimen: Wound from Sacrum           Imaging Reviewed:   CXR   CT abd/pelvis    Cardiology:  TTE  The left ventricle is normal in size with moderate concentric hypertrophy and low normal systolic function.  The estimated ejection fraction is 50%.  No clear evidence of endocarditis. If clinical suspicion persists, consider LATRICIA.    IMPRESSION & PLAN   1. S/p treatment for acute osteomyelitis of sacrum   Bone still exposed    2. Numerous other wounds, including erosions of base of scrotum with no clinical evidence of nikos's gangrene.  Superficial cultures of the wound are growing Pseudomonas and a positive culture from the surface would be expected    3. Persistent gross hematuria, for many weeks, on coumadin for MTHFR   INR down  4. Numerous other co-morbidities  5.  Candidemia , Pseudomonas as well, blood cultures drawn 6/5, PICC line removed 6/8    Recommendations:  -- Continue Mycamine, 14 days , counting from 06/08. EOT  06/22/2023  -- Continue Zosyn. At least 10-14 days with potential additional days for bone exposure in sacrum.  -- Encourage eating, improve nutrition, albumin is low at 2.2.  I discussed with patient and wife.     Dr PARKER: Discussed withDr. Gasca and spoke with Dr. Arenas who will see him at the [facility]    Medical Decision Making during this encounter was  [_] Low Complexity  [_] Moderate Complexity  [xx  ] High  Complexity

## 2023-06-11 NOTE — PLAN OF CARE
06/11/23 1113   Patient Assessment/Suction   Level of Consciousness (AVPU) alert   Respiratory Effort Normal;Unlabored   All Lung Fields Breath Sounds clear;diminished   Rhythm/Pattern, Respiratory depth regular;pattern regular;unlabored   Skin Integrity   $ Wound Care Tech Time 15 min   Area Observed Bridge of nose   Skin Appearance without discoloration   PRE-TX-O2   Device (Oxygen Therapy) room air   SpO2 96 %   Pulse Oximetry Type Intermittent   $ Pulse Oximetry - Multiple Charge Pulse Oximetry - Multiple   Pulse 68   Resp 18   Aerosol Therapy   $ Aerosol Therapy Charges PRN treatment not required   Respiratory Treatment Status (SVN) PRN treatment not required   Preset CPAP/BiPAP Settings   Mode Of Delivery Standby;BiPAP   $ CPAP/BiPAP Daily Charge BiPAP/CPAP Daily   $ Initial CPAP/BiPAP Setup? No   $ Is patient using? No/refused   Equipment Type V60   Education   $ Education Bronchodilator;BiPAP;15 min   Respiratory Evaluation   $ Care Plan Tech Time 15 min

## 2023-06-11 NOTE — PROGRESS NOTES
"[facility] Medicine  Progress Note    Patient Name: Richard Bustos  MRN: [id number]  Patient Class: IP- Inpatient   Admission Date: 6/5/2023  Length of Stay: 6 days  Attending Physician: Jonathan Watson MD  Primary Care Provider: Familia Ramirez Jr, MD        Subjective:     Principal Problem:Hematuria        HPI:  Richard Bustos is a 77 y.o. White male   With PMH of as noted below,  Recently treated for sacral osteomyelitis,   S/p diverting colostomy,  who presents with worsening sacral wound.     Pt reports he feels "fine"  He denies pain at this time  Pt's wound recently grew morganella, proteus,  Then later pseudomonas (only intermediate sensitivity to cefepime), and enterobacter cloacae (intermediate sensitivity to zosyn)      Overview/Hospital Course:  Seen and examined   Afebrile . Urine clearing in the tube .  INR better  D/w family long time . D/w case Mx . 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  This morning patient is doing well with no complaints.  Remains on IV antibiotics.  Awaiting discharge to LTAC.      Interval History:   As per subjective     Review of Systems  Objective:     Vital Signs (Most Recent):  Temp: 98.1 °F (36.7 °C) (06/11/23 0842)  Pulse: 75 (06/11/23 0842)  Resp: 16 " (06/11/23 0842)  BP: (!) 164/71 (06/11/23 0842)  SpO2: 95 % (06/11/23 0842) Vital Signs (24h Range):  Temp:  [97.4 °F (36.3 °C)-98.9 °F (37.2 °C)] 98.1 °F (36.7 °C)  Pulse:  [68-95] 75  Resp:  [16-20] 16  SpO2:  [94 %-97 %] 95 %  BP: (112-164)/(64-75) 164/71     Weight: 102.7 kg (226 lb 6.6 oz)  Body mass index is 34.43 kg/m².    Intake/Output Summary (Last 24 hours) at 6/11/2023 1040  Last data filed at 6/11/2023 0920  Gross per 24 hour   Intake 1460 ml   Output 4400 ml   Net -2940 ml           Physical Exam  Constitutional:       General: He is not in acute distress.     Appearance: He is well-developed.   HENT:      Head: Normocephalic.   Eyes:      Pupils: Pupils are equal, round, and reactive to light.   Cardiovascular:      Rate and Rhythm: Normal rate and regular rhythm.   Pulmonary:      Effort: Pulmonary effort is normal. No respiratory distress.   Abdominal:      General: Abdomen is flat. There is no distension.      Tenderness: There is no abdominal tenderness.   Musculoskeletal:      Cervical back: Neck supple.   Skin:     Findings: No rash.   Neurological:      Mental Status: He is alert and oriented to person, place, and time.   Psychiatric:         Mood and Affect: Mood normal.           Significant Labs: All pertinent labs within the past 24 hours have been reviewed.  CBC:   Recent Labs   Lab 06/10/23  0631 06/11/23  0554   WBC 8.29 8.59   HGB 8.2* 8.0*   HCT 24.4* 23.7*    403       CMP:   Recent Labs   Lab 06/10/23  0631 06/11/23  0554   * 136   K 4.1 3.8   CL 98 100   CO2 28 26   GLU 86 98   BUN 51* 47*   CREATININE 1.5* 1.5*   CALCIUM 9.1 9.0   PROT 6.2 6.0   ALBUMIN 2.2* 2.1*   BILITOT 0.5 0.9   ALKPHOS 241* 213*   AST 48* 33   ALT 37 30   ANIONGAP 9 10       Cardiac Markers: No results for input(s): CKMB, MYOGLOBIN, BNP, TROPISTAT in the last 48 hours.    Significant Imaging: I have reviewed all pertinent imaging results/findings within the past 24 hours.    Assessment/Plan:       Active Hospital Problems    Diagnosis    *Hematuria    Wound check, abscess    Candidemia    CLAU (iron deficiency anemia)    Pneumoperitoneum of unknown etiology    Closed nondisplaced fracture of lateral malleolus of fibula    Bedbound    Decubitus ulcer, multiple chronic    Anticoagulated on Coumadin    Anemia    HFrEF (heart failure with reduced ejection fraction)    Scrotal injury    Severe protein-calorie malnutrition    Pressure injury of sacral region, stage 4    Bacteremia    Complex renal cyst    MCI (mild cognitive impairment)    Paroxysmal atrial fibrillation     EKG stable  Stable on Coreg, no missed doses of anticoagulation. Continue anticoagulation as described below      LV dysfunction, EF 40%     Last Echo 5/2020  Repeat echo stable  Continue lisinopril and Coreg as now  Monitor      CKD (chronic kidney disease) stage 3, GFR 30-59 ml/min, 41    Aortic aneurysm, thoracic, 4.3 cm, 8/2010     Measurement of aortic root appears stable- has not had CTA chest to monitor.  Will discuss with Dr. Soto and will recommend CT chest to follow      Diabetes mellitus with chronic kidney disease, stage III    MTHFR mutation (methylenetetrahydrofolate reductase)    Sleep apnea, using BiPAP nightly, 1999, last sleep test in 2013    Hypertension associated with diabetes    CAD (coronary artery disease)    GERD (gastroesophageal reflux disease)       Plan:   zosyn for pseudomonas in blood culture  and total 14 days  Micagungin for yeast in blood culture and total 14 days     follow final yeast identification   Contact isolation precautions  Wound care for sacram  Wound. No treating for that   Hematuria resolved and resumed Coumadin 4 mg hs and may need to increased back gradually . DC with arslan and uro wants to keep atleast 1 week . Last hematuria 2 days ago  PO Lasix 20 mg b.i.d. and following volume status  S/p IV iron  Appear he is on triple therapy at home . Had cardiac stent but not recently and hold  aspirin  ECHO negative for infection signs. D/w cardiology   LTAC approval awaited and  Possible ready on Monday     Medically cleared for transfer to LTAC  Patient need to follow up with ID dr Arenas in LTAC . Dr Long communicated   Old PICC removed and got new midline     VTE Risk Mitigation (From admission, onward)           Ordered     warfarin (COUMADIN) tablet 4 mg  Daily         06/08/23 1402     IP VTE HIGH RISK PATIENT  Once         06/05/23 2315     Place sequential compression device  Until discontinued         06/05/23 2315     Reason for No Pharmacological VTE Prophylaxis  Once        Question:  Reasons:  Answer:  Already adequately anticoagulated on oral Anticoagulants    06/05/23 2315                    Discharge Planning   ORACIO: 6/9/2023     Code Status: Full Code   Is the patient medically ready for discharge?:     Reason for patient still in hospital (select all that apply): Treatment  Discharge Plan A: Long-term acute care facility (LTAC)   Discharge Delays: (!) Ambulance Transport/Facility Transport              Jonathan Watson MD  Department of Hospital Medicine   [facility]

## 2023-06-11 NOTE — RESPIRATORY THERAPY
06/10/23 2010   Patient Assessment/Suction   Level of Consciousness (AVPU) alert   Respiratory Effort Unlabored   Expansion/Accessory Muscles/Retractions no use of accessory muscles   All Lung Fields Breath Sounds clear;diminished   Skin Integrity   $ Wound Care Tech Time 15 min   Area Observed Bridge of nose   Skin Appearance without discoloration   PRE-TX-O2   Device (Oxygen Therapy) room air   SpO2 95 %   Pulse Oximetry Type Intermittent   $ Pulse Oximetry - Multiple Charge Pulse Oximetry - Multiple   Aerosol Therapy   $ Aerosol Therapy Charges PRN treatment not required   Preset CPAP/BiPAP Settings   Mode Of Delivery BiPAP;Standby   $ Is patient using? No/refused   Education   $ Education BiPAP;Bronchodilator;15 min   Respiratory Evaluation   $ Care Plan Tech Time 15 min   $ Eval/Re-eval Charges Re-evaluation

## 2023-06-11 NOTE — SUBJECTIVE & OBJECTIVE
Interval History:   As per subjective     Review of Systems  Objective:     Vital Signs (Most Recent):  Temp: 98.1 °F (36.7 °C) (06/11/23 0842)  Pulse: 75 (06/11/23 0842)  Resp: 16 (06/11/23 0842)  BP: (!) 164/71 (06/11/23 0842)  SpO2: 95 % (06/11/23 0842) Vital Signs (24h Range):  Temp:  [97.4 °F (36.3 °C)-98.9 °F (37.2 °C)] 98.1 °F (36.7 °C)  Pulse:  [68-95] 75  Resp:  [16-20] 16  SpO2:  [94 %-97 %] 95 %  BP: (112-164)/(64-75) 164/71     Weight: 102.7 kg (226 lb 6.6 oz)  Body mass index is 34.43 kg/m².    Intake/Output Summary (Last 24 hours) at 6/11/2023 1040  Last data filed at 6/11/2023 0920  Gross per 24 hour   Intake 1460 ml   Output 4400 ml   Net -2940 ml           Physical Exam  Constitutional:       General: He is not in acute distress.     Appearance: He is well-developed.   HENT:      Head: Normocephalic.   Eyes:      Pupils: Pupils are equal, round, and reactive to light.   Cardiovascular:      Rate and Rhythm: Normal rate and regular rhythm.   Pulmonary:      Effort: Pulmonary effort is normal. No respiratory distress.   Abdominal:      General: Abdomen is flat. There is no distension.      Tenderness: There is no abdominal tenderness.   Musculoskeletal:      Cervical back: Neck supple.   Skin:     Findings: No rash.   Neurological:      Mental Status: He is alert and oriented to person, place, and time.   Psychiatric:         Mood and Affect: Mood normal.           Significant Labs: All pertinent labs within the past 24 hours have been reviewed.  CBC:   Recent Labs   Lab 06/10/23  0631 06/11/23  0554   WBC 8.29 8.59   HGB 8.2* 8.0*   HCT 24.4* 23.7*    403       CMP:   Recent Labs   Lab 06/10/23  0631 06/11/23  0554   * 136   K 4.1 3.8   CL 98 100   CO2 28 26   GLU 86 98   BUN 51* 47*   CREATININE 1.5* 1.5*   CALCIUM 9.1 9.0   PROT 6.2 6.0   ALBUMIN 2.2* 2.1*   BILITOT 0.5 0.9   ALKPHOS 241* 213*   AST 48* 33   ALT 37 30   ANIONGAP 9 10       Cardiac Markers: No results for input(s):  CKMB, MYOGLOBIN, BNP, TROPISTAT in the last 48 hours.    Significant Imaging: I have reviewed all pertinent imaging results/findings within the past 24 hours.

## 2023-06-12 VITALS
OXYGEN SATURATION: 96 % | BODY MASS INDEX: 29.86 KG/M2 | DIASTOLIC BLOOD PRESSURE: 64 MMHG | SYSTOLIC BLOOD PRESSURE: 136 MMHG | WEIGHT: 197 LBS | TEMPERATURE: 99 F | HEART RATE: 78 BPM | HEIGHT: 68 IN | RESPIRATION RATE: 18 BRPM

## 2023-06-12 PROBLEM — S39.94XD: Status: ACTIVE | Noted: 2023-05-17

## 2023-06-12 LAB
ALBUMIN SERPL BCP-MCNC: 2.1 G/DL (ref 3.5–5.2)
ALP SERPL-CCNC: 186 U/L (ref 55–135)
ALT SERPL W/O P-5'-P-CCNC: 26 U/L (ref 10–44)
ANION GAP SERPL CALC-SCNC: 7 MMOL/L (ref 8–16)
AST SERPL-CCNC: 29 U/L (ref 10–40)
BACTERIA BLD CULT: ABNORMAL
BASOPHILS # BLD AUTO: 0.04 K/UL (ref 0–0.2)
BASOPHILS NFR BLD: 0.5 % (ref 0–1.9)
BILIRUB SERPL-MCNC: 0.6 MG/DL (ref 0.1–1)
BUN SERPL-MCNC: 51 MG/DL (ref 8–23)
CALCIUM SERPL-MCNC: 8.7 MG/DL (ref 8.7–10.5)
CHLORIDE SERPL-SCNC: 100 MMOL/L (ref 95–110)
CO2 SERPL-SCNC: 28 MMOL/L (ref 23–29)
CREAT SERPL-MCNC: 1.3 MG/DL (ref 0.5–1.4)
DIFFERENTIAL METHOD: ABNORMAL
EOSINOPHIL # BLD AUTO: 0.3 K/UL (ref 0–0.5)
EOSINOPHIL NFR BLD: 3.5 % (ref 0–8)
ERYTHROCYTE [DISTWIDTH] IN BLOOD BY AUTOMATED COUNT: 16.5 % (ref 11.5–14.5)
EST. GFR  (NO RACE VARIABLE): 57.3 ML/MIN/1.73 M^2
ESTIMATED AVG GLUCOSE: NORMAL MG/DL (ref 68–131)
GLUCOSE SERPL-MCNC: 111 MG/DL (ref 70–110)
GLUCOSE SERPL-MCNC: 88 MG/DL (ref 70–110)
HBA1C MFR BLD: NORMAL % (ref 4.5–6.2)
HCT VFR BLD AUTO: 22.6 % (ref 40–54)
HGB BLD-MCNC: 7.6 G/DL (ref 14–18)
IMM GRANULOCYTES # BLD AUTO: 0.07 K/UL (ref 0–0.04)
IMM GRANULOCYTES NFR BLD AUTO: 0.8 % (ref 0–0.5)
INR PPP: 2.7 (ref 0.8–1.2)
LYMPHOCYTES # BLD AUTO: 1.1 K/UL (ref 1–4.8)
LYMPHOCYTES NFR BLD: 12.9 % (ref 18–48)
MAGNESIUM SERPL-MCNC: 1.9 MG/DL (ref 1.6–2.6)
MCH RBC QN AUTO: 31.9 PG (ref 27–31)
MCHC RBC AUTO-ENTMCNC: 33.6 G/DL (ref 32–36)
MCV RBC AUTO: 95 FL (ref 82–98)
MONOCYTES # BLD AUTO: 0.8 K/UL (ref 0.3–1)
MONOCYTES NFR BLD: 9.1 % (ref 4–15)
NEUTROPHILS # BLD AUTO: 6.3 K/UL (ref 1.8–7.7)
NEUTROPHILS NFR BLD: 73.2 % (ref 38–73)
NRBC BLD-RTO: 0 /100 WBC
PLATELET # BLD AUTO: 415 K/UL (ref 150–450)
PMV BLD AUTO: 9.8 FL (ref 9.2–12.9)
POTASSIUM SERPL-SCNC: 3.5 MMOL/L (ref 3.5–5.1)
PROT SERPL-MCNC: 5.8 G/DL (ref 6–8.4)
PROTHROMBIN TIME: 28.4 SEC (ref 9–12.5)
RBC # BLD AUTO: 2.38 M/UL (ref 4.6–6.2)
SODIUM SERPL-SCNC: 135 MMOL/L (ref 136–145)
WBC # BLD AUTO: 8.55 K/UL (ref 3.9–12.7)

## 2023-06-12 PROCEDURE — 36415 COLL VENOUS BLD VENIPUNCTURE: CPT | Performed by: FAMILY MEDICINE

## 2023-06-12 PROCEDURE — 94660 CPAP INITIATION&MGMT: CPT

## 2023-06-12 PROCEDURE — 94761 N-INVAS EAR/PLS OXIMETRY MLT: CPT

## 2023-06-12 PROCEDURE — 99900031 HC PATIENT EDUCATION (STAT)

## 2023-06-12 PROCEDURE — 85610 PROTHROMBIN TIME: CPT | Performed by: FAMILY MEDICINE

## 2023-06-12 PROCEDURE — 99900035 HC TECH TIME PER 15 MIN (STAT)

## 2023-06-12 PROCEDURE — 25000003 PHARM REV CODE 250: Performed by: FAMILY MEDICINE

## 2023-06-12 PROCEDURE — 97530 THERAPEUTIC ACTIVITIES: CPT

## 2023-06-12 PROCEDURE — 83735 ASSAY OF MAGNESIUM: CPT | Performed by: FAMILY MEDICINE

## 2023-06-12 PROCEDURE — 25000003 PHARM REV CODE 250: Performed by: INTERNAL MEDICINE

## 2023-06-12 PROCEDURE — 97110 THERAPEUTIC EXERCISES: CPT

## 2023-06-12 PROCEDURE — 80053 COMPREHEN METABOLIC PANEL: CPT | Performed by: FAMILY MEDICINE

## 2023-06-12 PROCEDURE — 85025 COMPLETE CBC W/AUTO DIFF WBC: CPT | Performed by: FAMILY MEDICINE

## 2023-06-12 PROCEDURE — 63600175 PHARM REV CODE 636 W HCPCS: Performed by: INTERNAL MEDICINE

## 2023-06-12 RX ADMIN — GUAIFENESIN 600 MG: 600 TABLET, EXTENDED RELEASE ORAL at 09:06

## 2023-06-12 RX ADMIN — FUROSEMIDE 20 MG: 20 INJECTION, SOLUTION INTRAMUSCULAR; INTRAVENOUS at 05:06

## 2023-06-12 RX ADMIN — PIPERACILLIN SODIUM AND TAZOBACTAM SODIUM 3.38 G: 3; .375 INJECTION, POWDER, LYOPHILIZED, FOR SOLUTION INTRAVENOUS at 09:06

## 2023-06-12 RX ADMIN — AMIODARONE HYDROCHLORIDE 200 MG: 200 TABLET ORAL at 09:06

## 2023-06-12 RX ADMIN — Medication 6 MG: at 12:06

## 2023-06-12 RX ADMIN — PIPERACILLIN SODIUM AND TAZOBACTAM SODIUM 3.38 G: 3; .375 INJECTION, POWDER, LYOPHILIZED, FOR SOLUTION INTRAVENOUS at 02:06

## 2023-06-12 NOTE — PLAN OF CARE
06/12/23 0920   Patient Assessment/Suction   Level of Consciousness (AVPU) alert   Respiratory Effort Normal;Unlabored   All Lung Fields Breath Sounds diminished;clear   Rhythm/Pattern, Respiratory no shortness of breath reported   Skin Integrity   $ Wound Care Tech Time 15 min   Area Observed Bridge of nose   Skin Appearance without discoloration   PRE-TX-O2   Device (Oxygen Therapy) room air   SpO2 96 %   Pulse Oximetry Type Intermittent   $ Pulse Oximetry - Multiple Charge Pulse Oximetry - Multiple   Pulse 78   Resp 18   Aerosol Therapy   $ Aerosol Therapy Charges PRN treatment not required   Respiratory Treatment Status (SVN) PRN treatment not required   Preset CPAP/BiPAP Settings   Mode Of Delivery Standby;BiPAP   $ CPAP/BiPAP Daily Charge BiPAP/CPAP Daily   $ Initial CPAP/BiPAP Setup? No   $ Is patient using? No/refused   Equipment Type V60   Education   $ Education BiPAP;Bronchodilator;15 min   Respiratory Evaluation   $ Care Plan Tech Time 15 min

## 2023-06-12 NOTE — CARE UPDATE
06/11/23 2130   Patient Assessment/Suction   Level of Consciousness (AVPU) alert   Respiratory Effort Normal   PRE-TX-O2   Device (Oxygen Therapy) room air   SpO2 (!) 94 %   Pulse Oximetry Type Intermittent   $ Pulse Oximetry - Multiple Charge Pulse Oximetry - Multiple   Pulse 76   Resp 19   Aerosol Therapy   $ Aerosol Therapy Charges PRN treatment not required   Preset CPAP/BiPAP Settings   Mode Of Delivery BiPAP;Standby   Education   $ Education 15 min   Respiratory Evaluation   $ Eval/Re-eval Charges Evaluation

## 2023-06-12 NOTE — PLAN OF CARE
Problem: Adult Inpatient Plan of Care  Goal: Plan of Care Review  Outcome: Ongoing, Progressing  Goal: Absence of Hospital-Acquired Illness or Injury  Outcome: Ongoing, Progressing  Goal: Optimal Comfort and Wellbeing  Outcome: Ongoing, Progressing     Problem: Skin Injury Risk Increased  Goal: Skin Health and Integrity  Outcome: Ongoing, Progressing     Problem: Infection  Goal: Absence of Infection Signs and Symptoms  Outcome: Ongoing, Progressing

## 2023-06-12 NOTE — DISCHARGE SUMMARY
"[facility] Medicine  Discharge Summary      Patient Name: Richard Bustos  MRN: [id number]  JUNIOR: [id number]  Patient Class: IP- Inpatient  Admission Date: 6/5/2023  Hospital Length of Stay: 7 days  Discharge Date and Time:  06/12/2023 10:14 AM  Attending Physician: Jonathan Watson MD   Discharging Provider: Jonathan Watson MD  Primary Care Provider: Familia Ramirez Jr, MD    Primary Care Team: Networked reference to record PCT     HPI:   Richard Bustos is a 77 y.o. White male   With PMH of as noted below,  Recently treated for sacral osteomyelitis,   S/p diverting colostomy,  who presents with worsening sacral wound.     Pt reports he feels "fine"  He denies pain at this time  Pt's wound recently grew morganella, proteus,  Then later pseudomonas (only intermediate sensitivity to cefepime), and enterobacter cloacae (intermediate sensitivity to zosyn)      * No surgery found *      Hospital Course:   Seen and examined   Afebrile . Urine clearing in the tube .  INR better  D/w family long time . D/w case Mx . 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  This morning patient is doing well with no complaints.  Remains on IV antibiotics.  Awaiting discharge to LTAC.       zosyn for pseudomonas in blood culture  and " total 14 days  Micagungin for yeast in blood culture and total 14 days    follow final yeast identification   Contact isolation precautions  Wound care for sacram  Wound. No treating for that   Hematuria resolved and resumed Coumadin 4 mg hs and may need to increased back gradually . DC with mcdaniels and uro wants to keep atleast 1 week .   PO Lasix 20 mg b.i.d. and following volume status  S/p IV iron  Appear he is on triple therapy at home . Had cardiac stent but not recently and hold aspirin  ECHO negative for infection signs. D/w cardiology      Medically cleared for transfer to LTAC  Patient need to follow up with ID Dr. Arenas in LTAC . Dr Long communicated   Old PICC removed and got new midline   Patient discharged to LTAC on June 12.        Goals of Care Treatment Preferences:  Code Status: Full Code    Living Will: Yes          Physical Exam  Constitutional:       General: He is not in acute distress.     Appearance: He is well-developed.   HENT:      Head: Normocephalic.   Eyes:      Pupils: Pupils are equal, round, and reactive to light.   Cardiovascular:      Rate and Rhythm: Normal rate and regular rhythm.   Pulmonary:      Effort: Pulmonary effort is normal. No respiratory distress.   Abdominal:      General: Abdomen is flat. There is no distension.      Tenderness: There is no abdominal tenderness.   Musculoskeletal:      Cervical back: Neck supple.   Skin:     Findings: No rash.   Neurological:      Mental Status: He is alert and oriented to person, place, and time.   Psychiatric:         Mood and Affect: Mood normal.    Consults:   Consults (From admission, onward)        Status Ordering Provider     Inpatient consult to Midline team  Once        Provider:  (Not yet assigned)    EV Elias     Inpatient consult to Urology  Once        Provider:  Adeline Moss MD    Completed DAVID VALENTINO     Inpatient consult to Registered Dietitian/Nutritionist  Once         Provider:  (Not yet assigned)    Completed DARLIN TYSON     Inpatient consult to Orthopedic Surgery  Once        Provider:  Enrique Farooq MD    Completed DARLIN TYSON     Inpatient consult to Infectious Diseases  Once        Provider:  Di Long MD    Completed DARLIN TYSON          No new Assessment & Plan notes have been filed under this hospital service since the last note was generated.  Service: Hospital Medicine    Final Active Diagnoses:    Diagnosis Date Noted POA    PRINCIPAL PROBLEM:  Hematuria [R31.9] 06/02/2023 Yes    Wound check, abscess [Z51.89] 06/10/2023 Not Applicable    Candidemia [B37.7] 06/08/2023 Unknown    CLAU (iron deficiency anemia) [D50.9] 06/07/2023 Yes     Chronic    Pneumoperitoneum of unknown etiology [K66.8] 06/06/2023 Yes    Closed nondisplaced fracture of lateral malleolus of fibula [S82.66XA] 06/06/2023 Yes    Bedbound [Z74.01] 06/06/2023 Not Applicable     Chronic    Decubitus ulcer, multiple chronic [L89.90] 06/06/2023 Yes     Chronic    Anticoagulated on Coumadin [Z79.01] 06/06/2023 Not Applicable    Anemia [D64.9] 06/06/2023 Yes    HFrEF (heart failure with reduced ejection fraction) [I50.20] 06/05/2023 Yes    Scrotal injury [S39.94XA] 05/17/2023 Yes    Severe protein-calorie malnutrition [E43] 04/28/2023 Yes    Pressure injury of sacral region, stage 4 [L89.154] 04/19/2023 Yes    Bacteremia [R78.81] 01/08/2022 Unknown    Complex renal cyst [N28.1] 03/31/2020 Not Applicable    MCI (mild cognitive impairment) [G31.84] 09/18/2018 Yes    Paroxysmal atrial fibrillation [I48.0] 11/21/2014 Yes    LV dysfunction, EF 40% [I51.9] 02/08/2013 Yes    CKD (chronic kidney disease) stage 3, GFR 30-59 ml/min, 41 [N18.30] 01/23/2013 Yes    Aortic aneurysm, thoracic, 4.3 cm, 8/2010 [I71.20] 07/10/2012 Yes     Chronic    Diabetes mellitus with chronic kidney disease, stage III [E11.22] 12/16/2011 Yes     Chronic    MTHFR mutation  (methylenetetrahydrofolate reductase) [Z15.89] 12/16/2011 Not Applicable    Sleep apnea, using BiPAP nightly, 1999, last sleep test in 2013 [G47.30] 12/16/2011 Yes     Chronic    Hypertension associated with diabetes [E11.59, I15.2] 12/16/2011 Yes    CAD (coronary artery disease) [I25.10] 12/16/2011 Yes    GERD (gastroesophageal reflux disease) [K21.9] 12/16/2011 Yes      Problems Resolved During this Admission:       Discharged Condition: good    Disposition: Long Term Acute Care    Follow Up:   Contact information for follow-up providers     Familia Ramirez Jr, MD Follow up in 1 month(s).    Specialty: Family Medicine  Contact information:  [address]  [address]  [place] [address]  [phone]             Adeline Moss MD Follow up in 2 week(s).    Specialty: Urology  Contact information:  [address] [place] [address] [address]  [address]  [place] [address] [phone]             follow up with own cardiologist / PCP whether he need triple therapy Follow up.           Melissa Alberto MD Follow up.    Specialties: Infectious Diseases, Wound Care  Why: consult dr alberto at the facility  Contact information:  [address]  [address]  [place] [address]  [phone]             Melissa Alberto MD .    Specialties: Infectious Diseases, Wound Care  Contact information:  [address] [facility] [address]  [facility] [address] [phone]                   Contact information for after-discharge care     Destination     [facility] .    Service: Skilled Nursing  Contact information:  [address] [facility] [address] [address] [phone]                           Patient Instructions:      Diet Cardiac     Diet Cardiac     Activity as tolerated     Activity as tolerated       Significant Diagnostic Studies: N/A    Pending Diagnostic Studies:     None         Medications:  Reconciled Home Medications:      Medication List      START taking these medications    MICAFUNGIN 100 MG/100 ML NS (READY TO  MIX)  Inject 100 mLs (100 mg total) into the vein once daily. for 14 days     PIPERACILLIN-TAZOBACTAM 3.375 G/100 ML D5W (READY TO MIX)  Inject 100 mLs (3.375 g total) into the vein every 8 (eight) hours. for 14 days        CHANGE how you take these medications    triamcinolone acetonide 0.1% 0.1 % cream  Commonly known as: KENALOG  APPLY TO THE AFFECTED AREA(S) TWICE DAILY  What changed: See the new instructions.        CONTINUE taking these medications    allopurinoL 100 MG tablet  Commonly known as: ZYLOPRIM  Take 1 tablet (100 mg total) by mouth every evening.     amiodarone 200 MG Tab  Commonly known as: PACERONE  Take 1 tablet (200 mg total) by mouth 2 (two) times daily.     atorvastatin 80 MG tablet  Commonly known as: LIPITOR  Take 1 tablet (80 mg total) by mouth once daily.     calcitRIOL 0.5 MCG Cap  Commonly known as: ROCALTROL  Take 1 capsule (0.5 mcg total) by mouth once daily.     ciclopirox 8 % Soln  Commonly known as: PENLAC  Apply 1 application topically nightly.     clopidogreL 75 mg tablet  Commonly known as: PLAVIX  Take 1 tablet by mouth once daily.     famotidine 40 MG tablet  Commonly known as: PEPCID  Take 1 tablet (40 mg total) by mouth once daily.     ferrous sulfate 325 mg (65 mg iron) Tab tablet  Commonly known as: FeroSuL  TAKE 1 TABLET BY MOUTH TWICE DAILY     fluticasone propionate 50 mcg/actuation nasal spray  Commonly known as: FLONASE  1 spray by Each Nostril route once daily.     FOLTANX 3-35-2 mg Tab  Generic drug: mecobal-levomefolat Ca-B6 phos  Take 1 tablet by mouth once daily.     insulin aspart U-100 100 unit/mL (3 mL) Inpn pen  Commonly known as: NovoLOG  Inject into the skin 3 (three) times daily with meals.     lisinopriL 40 MG tablet  Commonly known as: PRINIVIL,ZESTRIL  Take 40 mg by mouth once daily.     mirabegron 50 mg Tb24  Commonly known as: MYRBETRIQ  Take 1 tablet by mouth once daily.     nitroGLYCERIN 0.4 MG/DOSE TL SPRY 400 mcg/spray spray  Commonly known as:  NITROLINGUAL  Place 1 spray under the tongue every 5 (five) minutes as needed.     omeprazole 20 MG capsule  Commonly known as: PRILOSEC  Take 20 mg by mouth once daily.     traZODone 50 MG tablet  Commonly known as: DESYREL  Take 1 tablet by mouth every evening.     warfarin 5 MG tablet  Commonly known as: COUMADIN  Take 1 tablet (5 mg total) by mouth Daily.        STOP taking these medications    ceftriaxone 2 g in 100 mL D5W 2 g/100 mL  IVPB     ECOTRIN LOW STRENGTH 81 MG EC tablet  Generic drug: aspirin     metronidazole 500 mg/100 mL IVPB  Commonly known as: FLAGYL            Indwelling Lines/Drains at time of discharge:   Lines/Drains/Airways     Drain  Duration                Colostomy 04/25/23 1425 LLQ 47 days         Urethral Catheter 06/05/23 0948 Silicone 7 days                Time spent on the discharge of patient: 45 minutes         Jonathan Watson MD  Department of Hospital Medicine  [facility]

## 2023-06-12 NOTE — PLAN OF CARE
Per Riri with [facility], they have auth and can take patient today.  LIONEL sent patient information to [facility] via careport and secure message.  Patient's nurse can call report to [phone].  Patient going to room 214.  Riri scheduled ambulance transport.       06/12/23 1002   Post-Acute Status   Post-Acute Authorization Placement   Post-Acute Placement Status Set-up Complete/Auth obtained

## 2023-06-12 NOTE — PT/OT/SLP PROGRESS
Physical Therapy      Patient Name:  Richard Bustos   MRN:  [id number]    Patient not seen today secondary to Other (Comment) (Pt prepping for discharge.).

## 2023-06-12 NOTE — PLAN OF CARE
Patient cleared for discharge from case management standpoint.       06/12/23 1004   Final Note   Assessment Type Final Discharge Note   Anticipated Discharge Disposition LTAC   What phone number can be called within the next 1-3 days to see how you are doing after discharge? [phone]   Hospital Resources/Appts/Education Provided Post-Acute resouces added to AVS

## 2023-06-12 NOTE — PT/OT/SLP PROGRESS
"Occupational Therapy   Treatment    Name: Richard Bustos  MRN: [id number]  Admitting Diagnosis:  Hematuria       Recommendations:     Discharge Recommendations: LTACH (long-term acute care hospital)  Discharge Equipment Recommendations:     Barriers to discharge:  Decreased caregiver support, Inaccessible home environment    Assessment:     Richard Bustos is a 77 y.o. male with a medical diagnosis of Hematuria.  Performance deficits affecting function are weakness, impaired endurance, impaired self care skills, impaired functional mobility, gait instability, impaired balance, decreased upper extremity function, decreased lower extremity function, abnormal tone, decreased ROM, impaired fine motor, impaired skin, edema, impaired cardiopulmonary response to activity, impaired muscle length.     Pt continues to exhibit high motivation to return to prior level of function. His pain is better managed as he did not c/o pain while sitting edge of bed. UE strength and range of motion remain limited, but he was able to hold himself up with handheld assist for 3 minutes, showing improvement in UE and trunk control. Pt requires increased time for bed mobility and positioning due to sacral wound.    Rehab Prognosis:  Fair; patient would benefit from acute skilled OT services to address these deficits and reach maximum level of function.       Plan:     Patient to be seen 3 x/week to address the above listed problems via self-care/home management, therapeutic activities, therapeutic exercises  Plan of Care Expires: 07/05/23  Plan of Care Reviewed with: patient, daughter    Subjective     Chief Complaint: "I want a break from those boots, they constrict my feet," referring to pressure relieving boots.  Patient/Family Comments/goals: to return to prior level of function  Pain/Comfort:  Pain Rating 1: 0/10    Objective:     Communicated with: nurse prior to session.  Patient found supine with bed alarm, telemetry, colostomy, mcdaniels " catheter upon OT entry to room.    General Precautions: Standard, contact, fall    Orthopedic Precautions:RLE non weight bearing  Braces: N/A  Respiratory Status: Room air     Occupational Performance:     Bed Mobility:    Patient completed Rolling/Turning to Right with maximal assistance  Patient completed Supine to Sit with maximal assistance and 2 persons  Patient completed Sit to Supine with maximal assistance and 2 persons     Treatment & Education:  Pt participated in active assisted range of motion of each limb prior to performing bed mobility. Pt sat edge of bed with maximum assistance of two persons.     Patient left supine with all lines intact, call button in reach, bed alarm on, and daguhters present    GOALS:   Multidisciplinary Problems       Occupational Therapy Goals          Problem: Occupational Therapy    Goal Priority Disciplines Outcome Interventions   Occupational Therapy Goal     OT, PT/OT Ongoing, Progressing    Description: Goals to be met by: 7/5/2023     Patient will increase functional independence with ADLs by performing:    Feeding with Supervision while seated upright in bed.  Grooming while in bed with Supervision.  Supine to sit with Moderate Assistance.  Bilateral Upper extremity exercise program, with supervision.                         Time Tracking:     OT Date of Treatment: 06/12/23  OT Start Time: 1003  OT Stop Time: 1026  OT Total Time (min): 23 min    Billable Minutes:Therapeutic Activity 8  Therapeutic Exercise 15    OT/MARZENA: OT          6/12/2023

## 2023-06-13 LAB
BACTERIA BLD CULT: NORMAL
BACTERIA CATH TIP CULT: NO GROWTH

## 2023-07-14 PROBLEM — S31.000A SACRAL WOUND: Status: ACTIVE | Noted: 2023-07-14

## 2023-07-14 PROBLEM — B49 FUNGEMIA: Status: RESOLVED | Noted: 2023-06-08 | Resolved: 2023-07-14

## 2023-07-15 PROBLEM — R53.2 FUNCTIONAL QUADRIPLEGIA: Chronic | Status: ACTIVE | Noted: 2023-07-15

## 2023-08-11 ENCOUNTER — TELEPHONE (OUTPATIENT)
Dept: FAMILY MEDICINE | Facility: CLINIC | Age: 78
End: 2023-08-11
Payer: MEDICARE

## 2023-08-11 NOTE — TELEPHONE ENCOUNTER
Spoke with Beronica she wanted to verify if the patient was still enrolled in the coumadin clinic.  Advised that the was discharged from the coumadin on 7/23/23 as he his under hospice care and they are taking over his care.

## 2023-08-11 NOTE — TELEPHONE ENCOUNTER
----- Message from Mouna Schmidt sent at 8/11/2023  2:52 PM CDT -----  Name of Who is Calling: Beronica [employer]        What is the request in detail: Would like to speak to staff in regards to pt coumadin        Can the clinic reply by [facility]: no        What Number to Call Back if not in [facility]: [phone]

## 2023-10-17 NOTE — PLAN OF CARE
Problem: Adult Inpatient Plan of Care  Goal: Plan of Care Review  4/24/2023 0604 by Sergio Quinn LPN  Outcome: Ongoing, Progressing  4/24/2023 0600 by Sergio Quinn LPN  Outcome: Ongoing, Not Progressing  Goal: Patient-Specific Goal (Individualized)  4/24/2023 0604 by Sergio Quinn LPN  Outcome: Ongoing, Progressing  4/24/2023 0600 by Sergio Quinn LPN  Outcome: Ongoing, Not Progressing  Goal: Absence of Hospital-Acquired Illness or Injury  4/24/2023 0604 by Sergio Quinn LPN  Outcome: Ongoing, Progressing  4/24/2023 0600 by Sergio Quinn LPN  Outcome: Ongoing, Not Progressing  Goal: Optimal Comfort and Wellbeing  4/24/2023 0604 by Sergio Quinn LPN  Outcome: Ongoing, Progressing  4/24/2023 0600 by Sergio Quinn LPN  Outcome: Ongoing, Not Progressing  Goal: Readiness for Transition of Care  4/24/2023 0604 by Sergio Quinn LPN  Outcome: Ongoing, Progressing  4/24/2023 0600 by Sergio Quinn LPN  Outcome: Ongoing, Not Progressing     Problem: Fluid and Electrolyte Imbalance (Acute Kidney Injury/Impairment)  Goal: Fluid and Electrolyte Balance  4/24/2023 0604 by Sergio Quinn LPN  Outcome: Ongoing, Progressing  4/24/2023 0600 by Sergio Quinn LPN  Outcome: Ongoing, Not Progressing     Problem: Oral Intake Inadequate (Acute Kidney Injury/Impairment)  Goal: Optimal Nutrition Intake  4/24/2023 0604 by Sergio Quinn LPN  Outcome: Ongoing, Progressing  4/24/2023 0600 by Sergio Quinn LPN  Outcome: Ongoing, Not Progressing     Problem: Renal Function Impairment (Acute Kidney Injury/Impairment)  Goal: Effective Renal Function  4/24/2023 0604 by Sergio Quinn LPN  Outcome: Ongoing, Progressing  4/24/2023 0600 by Sergio Quinn LPN  Outcome: Ongoing, Not Progressing     Problem: Adjustment to Illness (Sepsis/Septic Shock)  Goal: Optimal Coping  4/24/2023 0604 by Sergio Quinn LPN  Outcome: Ongoing, Progressing  4/24/2023 0600 by Sergio Quinn LPN  Outcome: Ongoing, Not Progressing    ok     Problem: Bleeding (Sepsis/Septic Shock)  Goal: Absence of Bleeding  4/24/2023 0604 by Sergio Quinn LPN  Outcome: Ongoing, Progressing  4/24/2023 0600 by Sergio Quinn LPN  Outcome: Ongoing, Not Progressing     Problem: Infection Progression (Sepsis/Septic Shock)  Goal: Absence of Infection Signs and Symptoms  4/24/2023 0604 by Sergio Quinn LPN  Outcome: Ongoing, Progressing  4/24/2023 0600 by Sergio Quinn LPN  Outcome: Ongoing, Not Progressing     Problem: Nutrition Impaired (Sepsis/Septic Shock)  Goal: Optimal Nutrition Intake  4/24/2023 0604 by Sergio Quinn LPN  Outcome: Ongoing, Progressing  4/24/2023 0600 by Sergio Quinn LPN  Outcome: Ongoing, Not Progressing     Problem: Infection  Goal: Absence of Infection Signs and Symptoms  4/24/2023 0604 by Sergio Quinn LPN  Outcome: Ongoing, Progressing  4/24/2023 0600 by Sergio Quinn LPN  Outcome: Ongoing, Not Progressing     Problem: Impaired Wound Healing  Goal: Optimal Wound Healing  4/24/2023 0604 by Sergio Quinn LPN  Outcome: Ongoing, Progressing  4/24/2023 0600 by Sergio Quinn LPN  Outcome: Ongoing, Not Progressing     Problem: Fall Injury Risk  Goal: Absence of Fall and Fall-Related Injury  4/24/2023 0604 by Sergio Quinn LPN  Outcome: Ongoing, Progressing  4/24/2023 0600 by Sergio Quinn LPN  Outcome: Ongoing, Not Progressing     Problem: Skin Injury Risk Increased  Goal: Skin Health and Integrity  4/24/2023 0604 by Sergio Quinn LPN  Outcome: Ongoing, Progressing  4/24/2023 0600 by Sergio Quinn LPN  Outcome: Ongoing, Not Progressing     Problem: Malnutrition  Goal: Improved Nutritional Intake  4/24/2023 0604 by Sergio Quinn LPN  Outcome: Ongoing, Progressing  4/24/2023 0600 by Sergio Quinn LPN  Outcome: Ongoing, Not Progressing

## 2024-04-10 NOTE — PLAN OF CARE
Cm completed the assessment with pt and son at bedside. Pt lives with his daughter-Angle (POA).  Pt is independent in care and use dme: rw, rollator or cane to get around.  Pt also, uses BIPAP at home.  PCP is Dr. Whaley.  Insurance verified s PHN/Medicaid.  Pt is a diabetic and on Coumadin. The doctor's office monitor's his coumadin level.  Pharmacy of choice is [facility] in [facility].  Disposition:  Pt will discharge to home with family. Pt has transportation provided by family members.  No needs verbalized at this time.       11/17/20 1702   Discharge Assessment   Assessment Type Discharge Planning Assessment   Confirmed/corrected address and phone number on facesheet? Yes   Assessment information obtained from? Patient;Caregiver   Expected Length of Stay (days) 3   Communicated expected length of stay with patient/caregiver yes   Prior to hospitilization cognitive status: Alert/Oriented   Prior to hospitalization functional status: Independent;Assistive Equipment   Current cognitive status: Alert/Oriented   Current Functional Status: Independent;Assistive Equipment   Facility Arrived From: home   Lives With child(monty), adult   Able to Return to Prior Arrangements yes   Is patient able to care for self after discharge? Yes   Who are your caregiver(s) and their phone number(s)? riya ABREU)- [phone]   Patient's perception of discharge disposition home or selfcare   Readmission Within the Last 30 Days no previous admission in last 30 days   Patient currently being followed by outpatient case management? Yes   If yes, name of outpatient case management following: insurance company assigned oupatient case management   Patient currently receives any other outside agency services? No   Equipment Currently Used at Home BIPAP;cane, straight;rollator;walker, rolling;shower chair;glucometer   Do you have any problems affording any of your prescribed medications? No   Is the patient taking  Dr. Anthony reviewed labs,history,EKG,and history and physical with no further intervention.   medications as prescribed? yes   Does the patient have transportation home? Yes   Transportation Anticipated family or friend will provide   Dialysis Name and Scheduled days NA   Does the patient receive services at the Coumadin Clinic? Yes   Discharge Plan A Home with family   Discharge Plan B Home with family   DME Needed Upon Discharge  none   Patient/Family in Agreement with Plan yes

## 2024-04-16 NOTE — HPI
Richard Bustos is a 74 yo male with PMHx significant for DM, Afib, HTN, CAD, MI, CHF, CKD, HLD, Hypercoaguable Prothrombin Disorder and GERD.  She was admitted to the service of hospital medicine.  He was admitted to the service of hospital medicine with GIB.  He presented to [facility] via EMS with a one day onset of fever of 101.7F at home, weakness and confusion per family.  Work up ensued and he was given IV Rocephin, IV Acyclovir. LP was attempted but unsuccessful.  His Guaiac stool was positive and he was transferred to [facility] for gastroenterology services.  He stated that recently he has been feeling fatigued and sleepy - more so than normal.  Today he went to [place] and when he got home he was running a fever.  He endorsed cough, post nasal gtt, sinus congestion, and one episode this am of nausea and vomiting.  Family stated he was repeating himself and diaphoretic.  He denied sore throat, trouble swallowing, chills, chest pain, palpitations, wheezing, sob, abdominal pain, diarrhea, dysuria and leg swelling.  He reported that he takes pain medication and Xanax, but has not taken them within the past week.  
Detail Level: Detailed
Detail Level: Zone

## 2024-07-09 ENCOUNTER — PATIENT MESSAGE (OUTPATIENT)
Dept: ADMINISTRATIVE | Facility: HOSPITAL | Age: 79
End: 2024-07-09
Payer: MEDICAID

## 2025-01-23 NOTE — PT/OT/SLP EVAL
Occupational Therapy   Evaluation    Name: Richard Bustos  MRN: [id number]  Admitting Diagnosis: Pneumonia  Recent Surgery: * No surgery found *      Recommendations:     Discharge Recommendations: nursing facility, skilled  Discharge Equipment Recommendations:  other (see comments) (TBD)  Barriers to discharge:       Assessment:     Richard Bustos is a 77 y.o. male with a medical diagnosis of Pneumonia.  He presents with the following performance deficits affecting function: weakness, impaired endurance, impaired self care skills, impaired functional mobility, gait instability, impaired balance, decreased upper extremity function, decreased lower extremity function, decreased ROM.  Pt was agreeable to OT. Daughters present. Pt demonstrates distal BUE AROM/strength WFL's. Pt R hand dominant. Pt with impaired AROM bilateral shoulders. Pt demonstrates AROM R shoulder flexion approximately 40 degrees. Pt demonstrates AROM L shoulder flexion approximately 30 degrees. PROM bilateral shoulder flexion WFL's. OT provided instruction in home safety with ADL's/IADL's including review of home set up and DME/AE. Pt/daughters verbalized understanding. OT provided instruction in AAROM R & L shoulder flex/ext, AROM bilateral shoulder IR/ER, and instruction in HEP for bilateral shoulders. Pt required assistance, cues, and demonstration to complete. Recommend SNF at discharge.    Rehab Prognosis: Good; patient would benefit from acute skilled OT services to address these deficits and reach maximum level of function.       Plan:     Patient to be seen 5 x/week to address the above listed problems via self-care/home management, therapeutic activities, therapeutic exercises  Plan of Care Expires: 04/18/23  Plan of Care Reviewed with: patient, daughter    Subjective     Chief Complaint: weakness  Patient/Family Comments/goals: To get stronger    Occupational Profile:  Living Environment: Pt lives with daughters in 1 story home with ramp. Pt  has tub/shower with tub transfer bench and grab bars and raised toilet with grab bars.   Previous level of function: Modified Independent with cane; Min/SBA with bathing.  Equipment Used at Home: rollator, bath bench, cane, quad  Assistance upon Discharge: Daughters    Pain/Comfort:  Pain Rating 1: 0/10  Pain Rating Post-Intervention 1: 0/10    Patients cultural, spiritual, Restoration conflicts given the current situation:      Objective:     Communicated with: Nurse Jennifer prior to session.  Patient found HOB elevated with bed alarm, peripheral IV, telemetry upon OT entry to room.    General Precautions: Standard, fall  Orthopedic Precautions: N/A  Braces: N/A  Respiratory Status: Room air    Occupational Performance:      Activities of Daily Living:  Feeding:  stand by assistance set up with HOB elevated  Grooming: moderate assistance    Bathing: maximal assistance    Upper Body Dressing: moderate assistance    Lower Body Dressing: maximal assistance    Toileting: maximal assistance      Cognitive/Visual Perceptual:  Pt alert and followed all commands for OT Evaluation.     Physical Exam:  Upper Extremity Strength:    -       Right Upper Extremity: Pt R hand dominant. Pt demonstrates AROM R shoulder flexion approximately 40 degrees. Pt demonstrates distal RUE AROM/strength WFL's  -       Left Upper Extremity: Pt demonstrates AROM L shoulder flexion     AMPAC 6 Click ADL:  AMPAC Total Score: 13    Treatment & Education:  OT provided education in role of OT. Pt/family verbalized understanding and was agreeable to OT.  OT provided instruction in home safety with ADL's/IADL's including review of home set up and DME/AE. Pt/family verbalized understanding.  OT provided instruction in AAROM R & L shoulder flexion/extension, AROM IR/ER with elbows at side; AROM distal BUE's and instruction in HEP for bilateral shoulders. Pt/daughter verbalized understanding. Pt required assistance, cues, and demonstration to  complete.  OT provided education in calling for assist. Pt verbalized understanding.    Patient left HOB elevated with all lines intact, call button in reach, bed alarm on, and daughters  present    GOALS:   Multidisciplinary Problems       Occupational Therapy Goals          Problem: Occupational Therapy    Goal Priority Disciplines Outcome Interventions   Occupational Therapy Goal     OT, PT/OT     Description: Goals to be met by: 4/18/23     Patient will increase functional independence with ADLs by performing:    UE Dressing with Modified Dixon.  LE Dressing with Modified Dixon.  Grooming while seated with Modified Dixon.  Toileting from raised toilet with grab bars with Modified Dixon for hygiene and clothing management.   Bathing from  shower chair/bench with Minimal Assistance.  Toilet transfer to raised toilet with grab bars with Modified Dixon.  Increased functional strength to WFL for ADL's/IADL's.                         History:     Past Medical History:   Diagnosis Date    Anemia     Anemia of other chronic disease 09/13/2017    Anemia, chronic renal failure 09/13/2017    Anemia, unspecified 09/13/2017    Anticoagulant long-term use     Aorta aneurysm     Arthritis     Atrial fibrillation     Bacteremia due to Streptococcus 01/08/2022    CAD (coronary artery disease)     CHF (congestive heart failure)     Chronic kidney disease     Clotting disorder 09/13/2017    Colon polyp     Dementia     Diabetes mellitus     not on meds    Diabetes mellitus type II     Diverticulosis     Elevated PSA     Encounter for blood transfusion     Former smoker     General anesthetics causing adverse effect in therapeutic use     TROUBLE COMING OUT OF ANESTHESIA WITH GASTRIC BYPASS    GERD (gastroesophageal reflux disease)     Gout     Hx of colonic polyps     Hypercoagulable state     Hyperlipidemia     Hypertension     MI, old 2010    MTHFR mutation     Myocardial infarction     9/2010     Osteomyelitis of ankle or foot 03/09/2017    Prostate cancer 06/2016    Sleep apnea     Squamous cell carcinoma 01/23/2018    Left helix, imiquimod    Venous stasis     Chronic         Past Surgical History:   Procedure Laterality Date    ABDOMINAL SURGERY      CARDIAC SURGERY      3 STENTS     CAUDAL EPIDURAL STEROID INJECTION N/A 6/22/2020    Procedure: INJECTION, STEROID, SPINE, EPIDURAL, CAUDAL;  Surgeon: Evangelist Bose MD;  Location: [facility] OR;  Service: Pain Management;  Laterality: N/A;    COLONOSCOPY  02/2011    diverticulosis with diverticula with clot, no active bleeding    COLONOSCOPY N/A 8/24/2016    Procedure: COLONOSCOPY;  Surgeon: Saroj Borjas MD;  Location: [facility] ENDO;  Service: Endoscopy;  Laterality: N/A;    COLONOSCOPY N/A 11/18/2020    Procedure: COLONOSCOPY;  Surgeon: Saroj Borjas MD;  Location: [facility] ENDO;  Service: Endoscopy;  Laterality: N/A;    COLONOSCOPY N/A 10/27/2021    Procedure: COLONOSCOPY;  Surgeon: Saroj Borjas MD;  Location: [facility] ENDO;  Service: Endoscopy;  Laterality: N/A;    EPIDURAL STEROID INJECTION INTO LUMBAR SPINE N/A 6/22/2020    Procedure: Injection-steroid-epidural-lumbar;  Surgeon: Evangelist Bose MD;  Location: [facility] OR;  Service: Pain Management;  Laterality: N/A;  L3 L4 L5 S1    EPIDURAL STEROID INJECTION INTO LUMBAR SPINE N/A 9/21/2020    Procedure: Injection-steroid-epidural-lumbar;  Surgeon: Evangelist Bose MD;  Location: [facility] OR;  Service: Pain Management;  Laterality: N/A;  L5-S1    GASTRIC BYPASS  2/5/2008    IVC FILTER RETRIEVAL      JOINT REPLACEMENT  1996 and 2001    bi-lat hip replacement/Rt Hip and Lt Hip    RADIOFREQUENCY ABLATION OF LUMBAR MEDIAL BRANCH NERVE AT SINGLE LEVEL Bilateral 1/10/2020    Procedure: Radiofrequency Ablation, Nerve, Spinal, Lumbar, Medial Branch, 1 Level;  Surgeon: Evangelist Bose MD;  Location: [facility] OR;  Service: Pain Management;  Laterality: Bilateral;  L3,L4,L5    RADIOFREQUENCY ABLATION OF LUMBAR MEDIAL BRANCH  NERVE AT SINGLE LEVEL Bilateral 11/23/2021    Procedure: Radiofrequency Ablation, Nerve, Spinal, Lumbar, Medial Branch, Bilateral L 3,4,5;  Surgeon: Nile Causey MD;  Location: [facility];  Service: Pain Management;  Laterality: Bilateral;    ROTATOR CUFF REPAIR      Rt shoulder    Stents  8/18/2010    x 3    UPPER GASTROINTESTINAL ENDOSCOPY  02/2011       Time Tracking:     OT Date of Treatment: 03/21/23  OT Start Time: 1049  OT Stop Time: 1108  OT Total Time (min): 19 min    Billable Minutes:Evaluation 8  Therapeutic Exercise 11    3/21/2023   Post Op
